# Patient Record
Sex: FEMALE | Race: OTHER | HISPANIC OR LATINO | Employment: OTHER | URBAN - METROPOLITAN AREA
[De-identification: names, ages, dates, MRNs, and addresses within clinical notes are randomized per-mention and may not be internally consistent; named-entity substitution may affect disease eponyms.]

---

## 2017-06-26 ENCOUNTER — TELEPHONE (OUTPATIENT)
Dept: CARDIOLOGY | Facility: MEDICAL CENTER | Age: 67
End: 2017-06-26

## 2017-06-28 ENCOUNTER — OFFICE VISIT (OUTPATIENT)
Dept: CARDIOLOGY | Facility: MEDICAL CENTER | Age: 67
End: 2017-06-28
Payer: MEDICARE

## 2017-06-28 VITALS
BODY MASS INDEX: 22.02 KG/M2 | HEIGHT: 64 IN | WEIGHT: 129 LBS | HEART RATE: 70 BPM | SYSTOLIC BLOOD PRESSURE: 180 MMHG | DIASTOLIC BLOOD PRESSURE: 60 MMHG

## 2017-06-28 DIAGNOSIS — I25.82 CHRONIC TOTAL OCCLUSION OF NATIVE CORONARY ARTERY: ICD-10-CM

## 2017-06-28 DIAGNOSIS — R94.39 ABNORMAL CARDIOVASCULAR STRESS TEST: Chronic | ICD-10-CM

## 2017-06-28 DIAGNOSIS — I25.10 CHRONIC TOTAL OCCLUSION OF NATIVE CORONARY ARTERY: ICD-10-CM

## 2017-06-28 DIAGNOSIS — Z95.5 S/P CORONARY ARTERY STENT PLACEMENT: ICD-10-CM

## 2017-06-28 DIAGNOSIS — I10 ESSENTIAL HYPERTENSION: ICD-10-CM

## 2017-06-28 PROCEDURE — 99214 OFFICE O/P EST MOD 30 MIN: CPT | Performed by: INTERNAL MEDICINE

## 2017-06-28 RX ORDER — ASPIRIN 81 MG/1
81 TABLET, CHEWABLE ORAL DAILY
Qty: 100 TAB | Refills: 11 | Status: SHIPPED | OUTPATIENT
Start: 2017-06-28 | End: 2018-08-21 | Stop reason: SDUPTHER

## 2017-06-28 RX ORDER — CLOPIDOGREL BISULFATE 75 MG/1
75 TABLET ORAL DAILY
Qty: 30 TAB | Refills: 11 | Status: SHIPPED | OUTPATIENT
Start: 2017-06-28 | End: 2017-11-10

## 2017-06-28 NOTE — Clinical Note
[facility]   [address], [address]  [place] [address]  Phone: [phone]  Fax: [phone]              Tere Swann  1950    Encounter Date: 6/28/2017    Ritesh Borjas M.D.          PROGRESS NOTE:  Subjective:   Tere Swann is a 66 y.o. female who presents today for follow-up of her history of coronary disease status post complex PCI for chronic total occlusion right coronary artery    Her son informs me that and she was not deemed a transplant candidate due to lack of supplemental insurance    Her blood pressure was elevated today she did have dialysis this morning she is unclear for blood pressures are elevated typically in the past we have not seen that        Past Medical History   Diagnosis Date   • Kidney transplant candidate    • Abnormal cardiovascular stress test    • Hyperlipidemia    • Hypertension    • Kidney disease      renal failure , on dialysis, M, W, F.   • High cholesterol    • Dental disorder      partial dentures- uppers   • Dialysis patient (CMS-HCC)      F [facility]   • Blood clotting disorder (CMS-HCC)      with AVF   • Diabetes (CMS-HCC)      oral medication     Past Surgical History   Procedure Laterality Date   • Recovery  8/16/2016     Procedure: CATH LAB [facility] WITH POSSIBLE DR. CASTILLO;  Surgeon: Recoveryonly Surgery;  Location: SURGERY PRE-POST PROC UNIT [facility];  Service:    • Other abdominal surgery       left kidney removed due to cancer   • Other       left arm upper extremity fistula   • Cardiac cath  8/16/16     100% RCA   • Cardiac cath  9/7/2016     RCA stented with 2 Synergy drug-eluting stents.     Family History   Problem Relation Age of Onset   • Heart Disease Neg Hx      History   Smoking status   • Never Smoker    Smokeless tobacco   • Never Used     No Known Allergies  Outpatient Encounter Prescriptions as of 6/28/2017   Medication Sig Dispense Refill   • clopidogrel (PLAVIX) 75 MG  "Tab Take 1 Tab by mouth every day. 30 Tab 11   • aspirin (ASA) 81 MG Chew Tab chewable tablet Take 1 Tab by mouth every day. 100 Tab 11   • glipiZIDE (GLUCOTROL) 5 MG Tab Take 1 Tab by mouth every day. 90 Tab 4   • furosemide (LASIX) 40 MG Tab TAKE 1 TABLET BY MOUTH DAILY 30 Tab 0   • amlodipine (NORVASC) 10 MG Tab Take 1 Tab by mouth every day. 30 Tab 11   • furosemide (LASIX) 40 MG Tab Take 1 Tab by mouth every day. 30 Tab 11   • metoprolol (LOPRESSOR) 25 MG Tab Take 1 Tab by mouth 2 Times a Day. 60 Tab 11   • pravastatin (PRAVACHOL) 40 MG tablet Take 1 Tab by mouth every day. 90 Tab 3   • [DISCONTINUED] clopidogrel (PLAVIX) 75 MG Tab Take 1 Tab by mouth every day. 30 Tab 11   • [DISCONTINUED] aspirin (ASA) 81 MG Chew Tab chewable tablet Take 1 Tab by mouth every day. 100 Tab 11     No facility-administered encounter medications on file as of 6/28/2017.     Review of Systems   Constitutional: Negative for fever and chills.   HENT: Negative for sore throat.    Eyes: Negative for blurred vision.   Respiratory: Negative for cough and shortness of breath.    Cardiovascular: Negative for chest pain, palpitations, claudication, leg swelling and PND.   Gastrointestinal: Negative for nausea and abdominal pain.   Musculoskeletal: Negative for falls.   Skin: Negative for rash.   Neurological: Negative for dizziness, focal weakness, loss of consciousness, weakness and headaches.   Endo/Heme/Allergies: Does not bruise/bleed easily.        Objective:   /60 mmHg  Pulse 70  Ht 1.626 m (5' 4.02\")  Wt 58.514 kg (129 lb)  BMI 22.13 kg/m2  LMP  (LMP Unknown)    Physical Exam   Constitutional: No distress.   HENT:   Mouth/Throat: Oropharynx is clear and moist.   Eyes: No scleral icterus.   Cardiovascular: Normal rate, regular rhythm and intact distal pulses.  Exam reveals no gallop and no friction rub.    No murmur heard.  Pulmonary/Chest: Effort normal and breath sounds normal. She has no rales.   Abdominal: Bowel sounds " are normal. There is no tenderness.   Musculoskeletal: She exhibits no edema.   Neurological: She is alert.   Skin: No rash noted. She is not diaphoretic.   Psychiatric: She has a normal mood and affect.       Assessment:     1. Chronic total occlusion of native coronary artery     2. S/P coronary artery stent placement  clopidogrel (PLAVIX) 75 MG Tab   3. Essential hypertension  aspirin (ASA) 81 MG Chew Tab chewable tablet   4. Abnormal cardiovascular stress test  aspirin (ASA) 81 MG Chew Tab chewable tablet       Medical Decision Making:  Today's Assessment / Status / Plan:     It was my pleasure to meet with Ms. Swann.    I renewed her dual anti- platelet therapy and reinforce it is important.    She remains a good candidate for her heart disease for a kidney transplant    I will see Ms. Swann back in 1 year time and encouraged her to follow up with us over the phone or e-mail using my MyChart as issues arise.    It is my pleasure to participate in the care of Ms. Swann.  Please do not hesitate to contact me with questions or concerns.    Ritesh Borjas MD PhD [facility]  Cardiologist [facility]        [facility]  VIA Facsimile: [phone]

## 2017-06-30 ASSESSMENT — ENCOUNTER SYMPTOMS
BLURRED VISION: 0
ABDOMINAL PAIN: 0
WEAKNESS: 0
BRUISES/BLEEDS EASILY: 0
PND: 0
SORE THROAT: 0
CHILLS: 0
SHORTNESS OF BREATH: 0
FOCAL WEAKNESS: 0
FALLS: 0
HEADACHES: 0
COUGH: 0
DIZZINESS: 0
PALPITATIONS: 0
LOSS OF CONSCIOUSNESS: 0
NAUSEA: 0
FEVER: 0
CLAUDICATION: 0

## 2017-06-30 NOTE — PROGRESS NOTES
Subjective:   Tere Swann is a 66 y.o. female who presents today for follow-up of her history of coronary disease status post complex PCI for chronic total occlusion right coronary artery    Her son informs me that and she was not deemed a transplant candidate due to lack of supplemental insurance    Her blood pressure was elevated today she did have dialysis this morning she is unclear for blood pressures are elevated typically in the past we have not seen that        Past Medical History   Diagnosis Date   • Kidney transplant candidate    • Abnormal cardiovascular stress test    • Hyperlipidemia    • Hypertension    • Kidney disease      renal failure , on dialysis, M, W, F.   • High cholesterol    • Dental disorder      partial dentures- uppers   • Dialysis patient (CMS-HCC)      MWF [facility]   • Blood clotting disorder (CMS-HCC)      with AVF   • Diabetes (CMS-HCC)      oral medication     Past Surgical History   Procedure Laterality Date   • Recovery  8/16/2016     Procedure: CATH LAB [facility] WITH POSSIBLE DR. CASTILLO;  Surgeon: Recoveryonly Surgery;  Location: SURGERY PRE-POST PROC UNIT [facility] [place];  Service:    • Other abdominal surgery       left kidney removed due to cancer   • Other       left arm upper extremity fistula   • Cardiac cath  8/16/16     100% RCA   • Cardiac cath  9/7/2016     RCA stented with 2 Synergy drug-eluting stents.     Family History   Problem Relation Age of Onset   • Heart Disease Neg Hx      History   Smoking status   • Never Smoker    Smokeless tobacco   • Never Used     No Known Allergies  Outpatient Encounter Prescriptions as of 6/28/2017   Medication Sig Dispense Refill   • clopidogrel (PLAVIX) 75 MG Tab Take 1 Tab by mouth every day. 30 Tab 11   • aspirin (ASA) 81 MG Chew Tab chewable tablet Take 1 Tab by mouth every day. 100 Tab 11   • glipiZIDE (GLUCOTROL) 5 MG Tab Take 1 Tab by mouth every day. 90 Tab 4   • furosemide (LASIX) 40 MG Tab TAKE 1 TABLET BY MOUTH DAILY  "30 Tab 0   • amlodipine (NORVASC) 10 MG Tab Take 1 Tab by mouth every day. 30 Tab 11   • furosemide (LASIX) 40 MG Tab Take 1 Tab by mouth every day. 30 Tab 11   • metoprolol (LOPRESSOR) 25 MG Tab Take 1 Tab by mouth 2 Times a Day. 60 Tab 11   • pravastatin (PRAVACHOL) 40 MG tablet Take 1 Tab by mouth every day. 90 Tab 3   • [DISCONTINUED] clopidogrel (PLAVIX) 75 MG Tab Take 1 Tab by mouth every day. 30 Tab 11   • [DISCONTINUED] aspirin (ASA) 81 MG Chew Tab chewable tablet Take 1 Tab by mouth every day. 100 Tab 11     No facility-administered encounter medications on file as of 6/28/2017.     Review of Systems   Constitutional: Negative for fever and chills.   HENT: Negative for sore throat.    Eyes: Negative for blurred vision.   Respiratory: Negative for cough and shortness of breath.    Cardiovascular: Negative for chest pain, palpitations, claudication, leg swelling and PND.   Gastrointestinal: Negative for nausea and abdominal pain.   Musculoskeletal: Negative for falls.   Skin: Negative for rash.   Neurological: Negative for dizziness, focal weakness, loss of consciousness, weakness and headaches.   Endo/Heme/Allergies: Does not bruise/bleed easily.        Objective:   /60 mmHg  Pulse 70  Ht 1.626 m (5' 4.02\")  Wt 58.514 kg (129 lb)  BMI 22.13 kg/m2  LMP  (LMP Unknown)    Physical Exam   Constitutional: No distress.   HENT:   Mouth/Throat: Oropharynx is clear and moist.   Eyes: No scleral icterus.   Cardiovascular: Normal rate, regular rhythm and intact distal pulses.  Exam reveals no gallop and no friction rub.    No murmur heard.  Pulmonary/Chest: Effort normal and breath sounds normal. She has no rales.   Abdominal: Bowel sounds are normal. There is no tenderness.   Musculoskeletal: She exhibits no edema.   Neurological: She is alert.   Skin: No rash noted. She is not diaphoretic.   Psychiatric: She has a normal mood and affect.       Assessment:     1. Chronic total occlusion of native coronary " artery     2. S/P coronary artery stent placement  clopidogrel (PLAVIX) 75 MG Tab   3. Essential hypertension  aspirin (ASA) 81 MG Chew Tab chewable tablet   4. Abnormal cardiovascular stress test  aspirin (ASA) 81 MG Chew Tab chewable tablet       Medical Decision Making:  Today's Assessment / Status / Plan:     It was my pleasure to meet with Ms. Swann.    I renewed her dual anti- platelet therapy and reinforce it is important.    She remains a good candidate for her heart disease for a kidney transplant    I will see Ms. Swann back in 1 year time and encouraged her to follow up with us over the phone or e-mail using my MyChart as issues arise.    It is my pleasure to participate in the care of Ms. Swann.  Please do not hesitate to contact me with questions or concerns.    Ritesh Borjas MD PhD FACC  Cardiologist [facility]

## 2017-07-27 DIAGNOSIS — I10 BENIGN ESSENTIAL HTN: ICD-10-CM

## 2017-07-27 RX ORDER — AMLODIPINE BESYLATE 10 MG/1
TABLET ORAL
Qty: 30 TAB | Refills: 3 | Status: SHIPPED | OUTPATIENT
Start: 2017-07-27 | End: 2017-10-13 | Stop reason: SDUPTHER

## 2017-09-08 ENCOUNTER — HOSPITAL ENCOUNTER (OUTPATIENT)
Dept: RADIOLOGY | Facility: MEDICAL CENTER | Age: 67
End: 2017-09-08
Attending: INTERNAL MEDICINE
Payer: MEDICARE

## 2017-09-08 DIAGNOSIS — Z86.11 HISTORY OF TB (TUBERCULOSIS): ICD-10-CM

## 2017-09-08 PROCEDURE — 71020 DX-CHEST-2 VIEWS: CPT

## 2017-10-05 ENCOUNTER — OFFICE VISIT (OUTPATIENT)
Dept: MEDICAL GROUP | Facility: CLINIC | Age: 67
End: 2017-10-05
Payer: MEDICARE

## 2017-10-05 VITALS
HEART RATE: 70 BPM | WEIGHT: 136 LBS | SYSTOLIC BLOOD PRESSURE: 168 MMHG | BODY MASS INDEX: 23.22 KG/M2 | HEIGHT: 64 IN | RESPIRATION RATE: 14 BRPM | DIASTOLIC BLOOD PRESSURE: 70 MMHG | OXYGEN SATURATION: 99 % | TEMPERATURE: 97.1 F

## 2017-10-05 DIAGNOSIS — Z99.2 END STAGE RENAL FAILURE ON DIALYSIS (HCC): ICD-10-CM

## 2017-10-05 DIAGNOSIS — F43.21 GRIEF: ICD-10-CM

## 2017-10-05 DIAGNOSIS — N18.6 END STAGE RENAL FAILURE ON DIALYSIS (HCC): ICD-10-CM

## 2017-10-05 DIAGNOSIS — N18.5 CKD (CHRONIC KIDNEY DISEASE) STAGE 5, GFR LESS THAN 15 ML/MIN (HCC): ICD-10-CM

## 2017-10-05 DIAGNOSIS — R63.0 POOR APPETITE: ICD-10-CM

## 2017-10-05 PROCEDURE — 99213 OFFICE O/P EST LOW 20 MIN: CPT | Performed by: NURSE PRACTITIONER

## 2017-10-05 RX ORDER — SEVELAMER CARBONATE 800 MG/1
2400 TABLET, FILM COATED ORAL 3 TIMES DAILY
COMMUNITY
End: 2018-12-22 | Stop reason: CLARIF

## 2017-10-05 RX ORDER — MIRTAZAPINE 7.5 MG/1
7.5 TABLET, FILM COATED ORAL
Qty: 30 TAB | Refills: 3 | Status: SHIPPED | OUTPATIENT
Start: 2017-10-05 | End: 2017-10-09

## 2017-10-05 ASSESSMENT — PATIENT HEALTH QUESTIONNAIRE - PHQ9
SUM OF ALL RESPONSES TO PHQ QUESTIONS 1-9: 9
5. POOR APPETITE OR OVEREATING: 2 - MORE THAN HALF THE DAYS
CLINICAL INTERPRETATION OF PHQ2 SCORE: 2

## 2017-10-05 ASSESSMENT — ENCOUNTER SYMPTOMS
WEAKNESS: 0
INSOMNIA: 1
BACK PAIN: 1
WEIGHT LOSS: 0
DEPRESSION: 1
DIZZINESS: 1
FEVER: 0
CHILLS: 0
HEADACHES: 0

## 2017-10-05 NOTE — PROGRESS NOTES
Chief Complaint   Patient presents with   • Loss of Appetite     a nd weak poss due to dyalisis treatment       HISTORY OF PRESENT ILLNESS: Patient is a 66 y.o. female established patient who presents today due to one month hx of poor appetite and feeling down. Pt's here is today with her son. Pt is Persian speaking only but son is able to help on interpretation. Son reports that pt's brother recently passed away about a month ago, Ever since then, pt seems to a little bit depressed. Pt's son is wondering if pt would benefit from taking medication. She denied suicidal ideation. Pt declined psychiatrist referral and would like to try on medication and follow up with PCP.     Pt's PHQ-9: 9    End stage renal failure on dialysis (CMS-HCC)  Hx of left nephrectomy due to kidney cancer  On dialysis MWF, following Dr. Burnett.   On waiting list for kidney transplant at [facility].    Pt's son requests lasix refill. Pt has been on lasix for a long time. Pt reports that she has ran out of medication and she does not remember when was her last dose and when was last time she talks to nephrologist.         Patient Active Problem List    Diagnosis Date Noted   • Encounter for cervical Pap smear with pelvic exam 09/23/2016   • Chronic total occlusion of native coronary artery 09/07/2016   • S/P coronary artery stent placement 09/07/2016   • Encounter to establish care with new doctor 09/01/2016   • Renal hypertension 08/05/2016   • End stage renal failure on dialysis (CMS-HCC) 08/05/2016   • Type 2 diabetes mellitus (CMS-HCC) 08/05/2016   • Kidney transplant candidate    • Abnormal cardiovascular stress test    • CKD (chronic kidney disease) stage 5, GFR less than 15 ml/min (CMS-HCC) 01/14/2014   • Essential hypertension 09/17/2013       Allergies:Review of patient's allergies indicates no known allergies.    Current Outpatient Prescriptions   Medication Sig Dispense Refill   • furosemide (LASIX) 40 MG Tab Take 1 Tab by mouth  "every day. 30 Tab 0   • Sevelamer Carbonate 800 MG Tab Take  by mouth.     • mirtazapine (REMERON) 7.5 MG tablet Take 1 Tab by mouth every bedtime. 30 Tab 3   • amlodipine (NORVASC) 10 MG Tab TAKE 1 TAB BY MOUTH EVERY DAY. 30 Tab 3   • clopidogrel (PLAVIX) 75 MG Tab Take 1 Tab by mouth every day. 30 Tab 11   • aspirin (ASA) 81 MG Chew Tab chewable tablet Take 1 Tab by mouth every day. 100 Tab 11   • glipiZIDE (GLUCOTROL) 5 MG Tab Take 1 Tab by mouth every day. 90 Tab 4   • metoprolol (LOPRESSOR) 25 MG Tab Take 1 Tab by mouth 2 Times a Day. 60 Tab 11   • pravastatin (PRAVACHOL) 40 MG tablet Take 1 Tab by mouth every day. 90 Tab 3     No current facility-administered medications for this visit.        Social History   Substance Use Topics   • Smoking status: Never Smoker   • Smokeless tobacco: Never Used   • Alcohol use No       Family Status   Relation Status   • Mother  at age 20+    ? CHF, had ascities   • Father  at age 93    old age   • Sister  at age 68    diabetes   • Neg Hx      Family History   Problem Relation Age of Onset   • Heart Disease Neg Hx          ROS:  Review of Systems   Constitutional: Positive for malaise/fatigue. Negative for chills, fever and weight loss.   Gastrointestinal:        Poor appetite for a month without losing weight   Musculoskeletal: Positive for back pain.   Neurological: Positive for dizziness (when chaning position). Negative for weakness and headaches.   Psychiatric/Behavioral: Positive for depression. Negative for suicidal ideas. The patient has insomnia.         Objective:     Blood pressure (!) 168/70, pulse 70, temperature 36.2 °C (97.1 °F), resp. rate 14, height 1.626 m (5' 4\"), weight 61.7 kg (136 lb), SpO2 99 %.     Physical Exam:  Physical Exam   Constitutional: She is well-developed, well-nourished, and in no distress.   HENT:   Head: Normocephalic and atraumatic.   Eyes: Conjunctivae are normal.   Neck: Neck supple. No JVD present. "   Cardiovascular: Normal rate.    Pulmonary/Chest: Effort normal. No respiratory distress. She has no wheezes. She has no rales.   Abdominal: Soft.   Musculoskeletal: Normal range of motion. She exhibits no edema or tenderness.   Skin: Skin is warm.   Vitals reviewed.      Assessment/Plan:  1. End stage renal failure on dialysis (CMS-HCC)  - HD MWF, follow up with nephrologist  - Obtain Results: Other (see comment); Obtain Results From: Other (see comment) (shanice [facility]): recent blood test result from [facility].  - per request: refill one month supply and pt has to follow up with nephrologist for future refill. furosemide (LASIX) 40 MG Tab; Take 1 Tab by mouth every day.  Dispense: 30 Tab; Refill: 0    2. Poor appetite, denied body weight loss.   - mirtazapine (REMERON) 7.5 MG tablet; Take 1 Tab by mouth every bedtime.  Dispense: 30 Tab; Refill: 3    3. Grief, mild depression  - mirtazapine (REMERON) 7.5 MG tablet; Take 1 Tab by mouth every bedtime.  Dispense: 30 Tab; Refill: 3  Pt's son is infomred on the sedation effect with mirtazapine. Fall precaution. He verbalized understanding.   - Follow up with PCP.     4. CKD (chronic kidney disease) stage 5, GFR less than 15 ml/min (CMS-HCC)  - furosemide (LASIX) 40 MG Tab; Take 1 Tab by mouth every day.  Dispense: 30 Tab; Refill: 0     Differential diagnoses and indications for immediate follow-up discussed with patient.    Instructed to return to clinic or nearest emergency department for any change in condition, further concerns, or worsening of symptoms.    The patient demonstrated a good understanding and agreed with the treatment plan.

## 2017-10-06 RX ORDER — FUROSEMIDE 40 MG/1
40 TABLET ORAL DAILY
Qty: 30 TAB | Refills: 0 | Status: SHIPPED | OUTPATIENT
Start: 2017-10-06 | End: 2017-11-27

## 2017-10-09 ENCOUNTER — TELEPHONE (OUTPATIENT)
Dept: MEDICAL GROUP | Facility: CLINIC | Age: 67
End: 2017-10-09

## 2017-10-09 DIAGNOSIS — F32.0 MILD SINGLE CURRENT EPISODE OF MAJOR DEPRESSIVE DISORDER (HCC): ICD-10-CM

## 2017-10-09 DIAGNOSIS — F51.04 PSYCHOPHYSIOLOGICAL INSOMNIA: ICD-10-CM

## 2017-10-09 DIAGNOSIS — R63.0 POOR APPETITE: ICD-10-CM

## 2017-10-09 RX ORDER — TRAZODONE HYDROCHLORIDE 50 MG/1
50 TABLET ORAL
Qty: 30 TAB | Refills: 3 | Status: SHIPPED | OUTPATIENT
Start: 2017-10-09 | End: 2018-12-22

## 2017-10-09 NOTE — TELEPHONE ENCOUNTER
1. Caller Name: Fabricio son                       Call Back Number: [phone] (home)       2. Message: spoke to fabricio son he will make the apt to nephrology today, but Fabricio wants to let you know that the medication you prescribe to his mom mirtazapine (REMERON) 7.5 MG tablet is making her very anxious at night please advise. Thank you     3. Patient approves office to leave a detailed voicemail/MyChart message: yes

## 2017-10-10 NOTE — TELEPHONE ENCOUNTER
I have placed referral order to psychiatrist for medication recommendation and adjustment.   Hold off mirtazapine. Try trazodone at night time. New prescription sent.    Thanks.  Nyla

## 2017-10-10 NOTE — TELEPHONE ENCOUNTER
Addendum:   It takes time to see psychiatrist. If she does not get to see psychiatrist, have pt return for follow up in 4 weeks or sooner if needed. Pt can also see her PCP for opinion.     Thanks.   Nyla

## 2017-10-13 DIAGNOSIS — I10 BENIGN ESSENTIAL HTN: ICD-10-CM

## 2017-10-13 RX ORDER — AMLODIPINE BESYLATE 10 MG/1
10 TABLET ORAL
Qty: 90 TAB | Refills: 3 | Status: SHIPPED | OUTPATIENT
Start: 2017-10-13 | End: 2018-12-22 | Stop reason: CLARIF

## 2017-10-16 ENCOUNTER — HOSPITAL ENCOUNTER (INPATIENT)
Facility: MEDICAL CENTER | Age: 67
LOS: 2 days | DRG: 304 | End: 2017-10-18
Attending: EMERGENCY MEDICINE | Admitting: INTERNAL MEDICINE
Payer: MEDICARE

## 2017-10-16 ENCOUNTER — APPOINTMENT (OUTPATIENT)
Dept: RADIOLOGY | Facility: MEDICAL CENTER | Age: 67
DRG: 304 | End: 2017-10-16
Attending: EMERGENCY MEDICINE
Payer: MEDICARE

## 2017-10-16 ENCOUNTER — RESOLUTE PROFESSIONAL BILLING HOSPITAL PROF FEE (OUTPATIENT)
Dept: HOSPITALIST | Facility: MEDICAL CENTER | Age: 67
End: 2017-10-16
Payer: MEDICARE

## 2017-10-16 DIAGNOSIS — I16.0 HYPERTENSIVE URGENCY: ICD-10-CM

## 2017-10-16 DIAGNOSIS — R07.9 CHEST PAIN, UNSPECIFIED TYPE: ICD-10-CM

## 2017-10-16 DIAGNOSIS — R06.02 SHORTNESS OF BREATH: ICD-10-CM

## 2017-10-16 PROBLEM — N18.6 ESRD (END STAGE RENAL DISEASE) (HCC): Status: ACTIVE | Noted: 2017-10-16

## 2017-10-16 PROBLEM — I50.9 ACUTE CHF (HCC): Status: ACTIVE | Noted: 2017-10-16

## 2017-10-16 LAB
ALBUMIN SERPL BCP-MCNC: 4.1 G/DL (ref 3.2–4.9)
ALBUMIN/GLOB SERPL: 1.2 G/DL
ALP SERPL-CCNC: 81 U/L (ref 30–99)
ALT SERPL-CCNC: 35 U/L (ref 2–50)
ANION GAP SERPL CALC-SCNC: 11 MMOL/L (ref 0–11.9)
AST SERPL-CCNC: 33 U/L (ref 12–45)
BASOPHILS # BLD AUTO: 0.3 % (ref 0–1.8)
BASOPHILS # BLD: 0.01 K/UL (ref 0–0.12)
BILIRUB SERPL-MCNC: 0.9 MG/DL (ref 0.1–1.5)
BNP SERPL-MCNC: 2065 PG/ML (ref 0–100)
BUN SERPL-MCNC: 23 MG/DL (ref 8–22)
CALCIUM SERPL-MCNC: 9.3 MG/DL (ref 8.4–10.2)
CHLORIDE SERPL-SCNC: 95 MMOL/L (ref 96–112)
CO2 SERPL-SCNC: 32 MMOL/L (ref 20–33)
CREAT SERPL-MCNC: 4.77 MG/DL (ref 0.5–1.4)
EKG IMPRESSION: NORMAL
EKG IMPRESSION: NORMAL
EOSINOPHIL # BLD AUTO: 0.05 K/UL (ref 0–0.51)
EOSINOPHIL NFR BLD: 1.5 % (ref 0–6.9)
ERYTHROCYTE [DISTWIDTH] IN BLOOD BY AUTOMATED COUNT: 43.9 FL (ref 35.9–50)
GFR SERPL CREATININE-BSD FRML MDRD: 9 ML/MIN/1.73 M 2
GLOBULIN SER CALC-MCNC: 3.4 G/DL (ref 1.9–3.5)
GLUCOSE BLD-MCNC: 171 MG/DL (ref 65–99)
GLUCOSE SERPL-MCNC: 127 MG/DL (ref 65–99)
HCT VFR BLD AUTO: 25.7 % (ref 37–47)
HGB BLD-MCNC: 8.7 G/DL (ref 12–16)
IMM GRANULOCYTES # BLD AUTO: 0.02 K/UL (ref 0–0.11)
IMM GRANULOCYTES NFR BLD AUTO: 0.6 % (ref 0–0.9)
LYMPHOCYTES # BLD AUTO: 0.57 K/UL (ref 1–4.8)
LYMPHOCYTES NFR BLD: 16.7 % (ref 22–41)
MCH RBC QN AUTO: 30.4 PG (ref 27–33)
MCHC RBC AUTO-ENTMCNC: 33.9 G/DL (ref 33.6–35)
MCV RBC AUTO: 89.9 FL (ref 81.4–97.8)
MONOCYTES # BLD AUTO: 0.21 K/UL (ref 0–0.85)
MONOCYTES NFR BLD AUTO: 6.1 % (ref 0–13.4)
NEUTROPHILS # BLD AUTO: 2.56 K/UL (ref 2–7.15)
NEUTROPHILS NFR BLD: 74.8 % (ref 44–72)
NRBC # BLD AUTO: 0 K/UL
NRBC BLD AUTO-RTO: 0 /100 WBC
PLATELET # BLD AUTO: 142 K/UL (ref 164–446)
PMV BLD AUTO: 12.2 FL (ref 9–12.9)
POTASSIUM SERPL-SCNC: 4.2 MMOL/L (ref 3.6–5.5)
PROT SERPL-MCNC: 7.5 G/DL (ref 6–8.2)
RBC # BLD AUTO: 2.86 M/UL (ref 4.2–5.4)
SODIUM SERPL-SCNC: 138 MMOL/L (ref 135–145)
TROPONIN I SERPL-MCNC: 0.05 NG/ML (ref 0–0.04)
WBC # BLD AUTO: 3.4 K/UL (ref 4.8–10.8)

## 2017-10-16 PROCEDURE — 700102 HCHG RX REV CODE 250 W/ 637 OVERRIDE(OP): Performed by: INTERNAL MEDICINE

## 2017-10-16 PROCEDURE — 96374 THER/PROPH/DIAG INJ IV PUSH: CPT

## 2017-10-16 PROCEDURE — 99285 EMERGENCY DEPT VISIT HI MDM: CPT

## 2017-10-16 PROCEDURE — 80053 COMPREHEN METABOLIC PANEL: CPT

## 2017-10-16 PROCEDURE — 700102 HCHG RX REV CODE 250 W/ 637 OVERRIDE(OP): Performed by: EMERGENCY MEDICINE

## 2017-10-16 PROCEDURE — 770020 HCHG ROOM/CARE - TELE (206)

## 2017-10-16 PROCEDURE — 90662 IIV NO PRSV INCREASED AG IM: CPT | Performed by: INTERNAL MEDICINE

## 2017-10-16 PROCEDURE — 90471 IMMUNIZATION ADMIN: CPT

## 2017-10-16 PROCEDURE — 82962 GLUCOSE BLOOD TEST: CPT

## 2017-10-16 PROCEDURE — 700111 HCHG RX REV CODE 636 W/ 250 OVERRIDE (IP)

## 2017-10-16 PROCEDURE — 71010 DX-CHEST-PORTABLE (1 VIEW): CPT

## 2017-10-16 PROCEDURE — 36415 COLL VENOUS BLD VENIPUNCTURE: CPT

## 2017-10-16 PROCEDURE — 83880 ASSAY OF NATRIURETIC PEPTIDE: CPT

## 2017-10-16 PROCEDURE — 700102 HCHG RX REV CODE 250 W/ 637 OVERRIDE(OP)

## 2017-10-16 PROCEDURE — 93005 ELECTROCARDIOGRAM TRACING: CPT | Performed by: EMERGENCY MEDICINE

## 2017-10-16 PROCEDURE — 93005 ELECTROCARDIOGRAM TRACING: CPT

## 2017-10-16 PROCEDURE — 90670 PCV13 VACCINE IM: CPT

## 2017-10-16 PROCEDURE — 700101 HCHG RX REV CODE 250: Performed by: EMERGENCY MEDICINE

## 2017-10-16 PROCEDURE — A9270 NON-COVERED ITEM OR SERVICE: HCPCS | Performed by: EMERGENCY MEDICINE

## 2017-10-16 PROCEDURE — 700111 HCHG RX REV CODE 636 W/ 250 OVERRIDE (IP): Performed by: INTERNAL MEDICINE

## 2017-10-16 PROCEDURE — 99223 1ST HOSP IP/OBS HIGH 75: CPT | Mod: AI | Performed by: INTERNAL MEDICINE

## 2017-10-16 PROCEDURE — A9270 NON-COVERED ITEM OR SERVICE: HCPCS | Performed by: INTERNAL MEDICINE

## 2017-10-16 PROCEDURE — 84484 ASSAY OF TROPONIN QUANT: CPT

## 2017-10-16 PROCEDURE — 3E0234Z INTRODUCTION OF SERUM, TOXOID AND VACCINE INTO MUSCLE, PERCUTANEOUS APPROACH: ICD-10-PCS | Performed by: INTERNAL MEDICINE

## 2017-10-16 PROCEDURE — 85025 COMPLETE CBC W/AUTO DIFF WBC: CPT

## 2017-10-16 RX ORDER — ASPIRIN 325 MG
325 TABLET ORAL DAILY
Status: DISCONTINUED | OUTPATIENT
Start: 2017-10-17 | End: 2017-10-18 | Stop reason: HOSPADM

## 2017-10-16 RX ORDER — LABETALOL HYDROCHLORIDE 5 MG/ML
20 INJECTION, SOLUTION INTRAVENOUS ONCE
Status: COMPLETED | OUTPATIENT
Start: 2017-10-16 | End: 2017-10-16

## 2017-10-16 RX ORDER — ASPIRIN 325 MG
325 TABLET ORAL ONCE
Status: COMPLETED | OUTPATIENT
Start: 2017-10-16 | End: 2017-10-16

## 2017-10-16 RX ORDER — TRAZODONE HYDROCHLORIDE 50 MG/1
50 TABLET ORAL
Status: DISCONTINUED | OUTPATIENT
Start: 2017-10-16 | End: 2017-10-18 | Stop reason: HOSPADM

## 2017-10-16 RX ORDER — FUROSEMIDE 10 MG/ML
40 INJECTION INTRAMUSCULAR; INTRAVENOUS
Status: DISCONTINUED | OUTPATIENT
Start: 2017-10-16 | End: 2017-10-18 | Stop reason: HOSPADM

## 2017-10-16 RX ORDER — ASPIRIN 600 MG/1
300 SUPPOSITORY RECTAL DAILY
Status: DISCONTINUED | OUTPATIENT
Start: 2017-10-17 | End: 2017-10-18 | Stop reason: HOSPADM

## 2017-10-16 RX ORDER — LABETALOL HYDROCHLORIDE 5 MG/ML
10 INJECTION, SOLUTION INTRAVENOUS EVERY 4 HOURS PRN
Status: DISCONTINUED | OUTPATIENT
Start: 2017-10-16 | End: 2017-10-18 | Stop reason: HOSPADM

## 2017-10-16 RX ORDER — HEPARIN SODIUM 5000 [USP'U]/ML
5000 INJECTION, SOLUTION INTRAVENOUS; SUBCUTANEOUS EVERY 8 HOURS
Status: DISCONTINUED | OUTPATIENT
Start: 2017-10-17 | End: 2017-10-18 | Stop reason: HOSPADM

## 2017-10-16 RX ORDER — ASPIRIN 81 MG/1
324 TABLET, CHEWABLE ORAL DAILY
Status: DISCONTINUED | OUTPATIENT
Start: 2017-10-17 | End: 2017-10-18 | Stop reason: HOSPADM

## 2017-10-16 RX ORDER — ATORVASTATIN CALCIUM 20 MG/1
20 TABLET, FILM COATED ORAL NIGHTLY
COMMUNITY
End: 2019-02-23

## 2017-10-16 RX ORDER — CLOPIDOGREL BISULFATE 75 MG/1
75 TABLET ORAL DAILY
Status: DISCONTINUED | OUTPATIENT
Start: 2017-10-17 | End: 2017-10-18 | Stop reason: HOSPADM

## 2017-10-16 RX ORDER — AMOXICILLIN 250 MG
2 CAPSULE ORAL 2 TIMES DAILY
Status: DISCONTINUED | OUTPATIENT
Start: 2017-10-16 | End: 2017-10-18 | Stop reason: HOSPADM

## 2017-10-16 RX ORDER — AMLODIPINE BESYLATE 5 MG/1
10 TABLET ORAL DAILY
Status: DISCONTINUED | OUTPATIENT
Start: 2017-10-17 | End: 2017-10-18 | Stop reason: HOSPADM

## 2017-10-16 RX ORDER — SEVELAMER HYDROCHLORIDE 800 MG/1
800 TABLET, FILM COATED ORAL
Status: DISCONTINUED | OUTPATIENT
Start: 2017-10-17 | End: 2017-10-18 | Stop reason: HOSPADM

## 2017-10-16 RX ORDER — DEXTROSE MONOHYDRATE 25 G/50ML
25 INJECTION, SOLUTION INTRAVENOUS
Status: DISCONTINUED | OUTPATIENT
Start: 2017-10-16 | End: 2017-10-18 | Stop reason: HOSPADM

## 2017-10-16 RX ORDER — ATORVASTATIN CALCIUM 40 MG/1
20 TABLET, FILM COATED ORAL NIGHTLY
Status: DISCONTINUED | OUTPATIENT
Start: 2017-10-16 | End: 2017-10-18 | Stop reason: HOSPADM

## 2017-10-16 RX ORDER — POLYETHYLENE GLYCOL 3350 17 G/17G
1 POWDER, FOR SOLUTION ORAL
Status: DISCONTINUED | OUTPATIENT
Start: 2017-10-16 | End: 2017-10-18 | Stop reason: HOSPADM

## 2017-10-16 RX ORDER — METOPROLOL TARTRATE 50 MG/1
25 TABLET, FILM COATED ORAL 2 TIMES DAILY
Status: DISCONTINUED | OUTPATIENT
Start: 2017-10-16 | End: 2017-10-18 | Stop reason: HOSPADM

## 2017-10-16 RX ORDER — BISACODYL 10 MG
10 SUPPOSITORY, RECTAL RECTAL
Status: DISCONTINUED | OUTPATIENT
Start: 2017-10-16 | End: 2017-10-18 | Stop reason: HOSPADM

## 2017-10-16 RX ADMIN — ATORVASTATIN CALCIUM 20 MG: 40 TABLET, FILM COATED ORAL at 22:28

## 2017-10-16 RX ADMIN — INFLUENZA A VIRUSA/MICHIGAN/45/2015 X-275 (H1N1) ANTIGEN (FORMALDEHYDE INACTIVATED), INFLUENZA A VIRUS A/HONG KONG/4801/2014 X-263B (H3N2) ANTIGEN (FORMALDEHYDE INACTIVATED), AND INFLUENZA B VIRUS B/BRISBANE/60/2008 ANTIGEN (FORMALDEHYDE INACTIVATED) 0.5 ML: 60; 60; 60 INJECTION, SUSPENSION INTRAMUSCULAR at 22:50

## 2017-10-16 RX ADMIN — INSULIN LISPRO 2 UNITS: 100 INJECTION, SOLUTION INTRAVENOUS; SUBCUTANEOUS at 22:42

## 2017-10-16 RX ADMIN — METOPROLOL TARTRATE 25 MG: 50 TABLET, FILM COATED ORAL at 22:26

## 2017-10-16 RX ADMIN — LABETALOL HYDROCHLORIDE 20 MG: 5 INJECTION, SOLUTION INTRAVENOUS at 19:01

## 2017-10-16 RX ADMIN — PNEUMOCOCCAL 13-VALENT CONJUGATE VACCINE 0.5 ML: 2.2; 2.2; 2.2; 2.2; 2.2; 4.4; 2.2; 2.2; 2.2; 2.2; 2.2; 2.2; 2.2 INJECTION, SUSPENSION INTRAMUSCULAR at 22:46

## 2017-10-16 RX ADMIN — ASPIRIN 325 MG ORAL TABLET 325 MG: 325 PILL ORAL at 19:01

## 2017-10-16 RX ADMIN — FUROSEMIDE 40 MG: 10 INJECTION, SOLUTION INTRAVENOUS at 22:29

## 2017-10-16 RX ADMIN — TRAZODONE HYDROCHLORIDE 50 MG: 50 TABLET ORAL at 22:26

## 2017-10-16 RX ADMIN — STANDARDIZED SENNA CONCENTRATE AND DOCUSATE SODIUM 2 TABLET: 8.6; 5 TABLET, FILM COATED ORAL at 22:28

## 2017-10-16 ASSESSMENT — LIFESTYLE VARIABLES
DO YOU DRINK ALCOHOL: NO
EVER_SMOKED: NEVER

## 2017-10-16 ASSESSMENT — PATIENT HEALTH QUESTIONNAIRE - PHQ9
4. FEELING TIRED OR HAVING LITTLE ENERGY: NEARLY EVERY DAY
SUM OF ALL RESPONSES TO PHQ9 QUESTIONS 1 AND 2: 1
9. THOUGHTS THAT YOU WOULD BE BETTER OFF DEAD, OR OF HURTING YOURSELF: NOT AT ALL
5. POOR APPETITE OR OVEREATING: MORE THAN HALF THE DAYS
8. MOVING OR SPEAKING SO SLOWLY THAT OTHER PEOPLE COULD HAVE NOTICED. OR THE OPPOSITE, BEING SO FIGETY OR RESTLESS THAT YOU HAVE BEEN MOVING AROUND A LOT MORE THAN USUAL: NOT AT ALL
7. TROUBLE CONCENTRATING ON THINGS, SUCH AS READING THE NEWSPAPER OR WATCHING TELEVISION: NOT AT ALL
1. LITTLE INTEREST OR PLEASURE IN DOING THINGS: SEVERAL DAYS
6. FEELING BAD ABOUT YOURSELF - OR THAT YOU ARE A FAILURE OR HAVE LET YOURSELF OR YOUR FAMILY DOWN: NOT AL ALL
SUM OF ALL RESPONSES TO PHQ QUESTIONS 1-9: 6
2. FEELING DOWN, DEPRESSED, IRRITABLE, OR HOPELESS: NOT AT ALL

## 2017-10-16 ASSESSMENT — PAIN SCALES - GENERAL: PAINLEVEL_OUTOF10: 0

## 2017-10-17 ENCOUNTER — APPOINTMENT (OUTPATIENT)
Dept: RADIOLOGY | Facility: MEDICAL CENTER | Age: 67
DRG: 304 | End: 2017-10-17
Attending: INTERNAL MEDICINE
Payer: MEDICARE

## 2017-10-17 PROBLEM — I50.33 ACUTE ON CHRONIC DIASTOLIC CHF (CONGESTIVE HEART FAILURE) (HCC): Status: ACTIVE | Noted: 2017-10-16

## 2017-10-17 PROBLEM — J96.01 ACUTE HYPOXEMIC RESPIRATORY FAILURE (HCC): Status: ACTIVE | Noted: 2017-10-17

## 2017-10-17 LAB
ALBUMIN SERPL BCP-MCNC: 3 G/DL (ref 3.2–4.9)
ALBUMIN/GLOB SERPL: 1.3 G/DL
ALP SERPL-CCNC: 71 U/L (ref 30–99)
ALT SERPL-CCNC: 24 U/L (ref 2–50)
ANION GAP SERPL CALC-SCNC: 9 MMOL/L (ref 0–11.9)
AST SERPL-CCNC: 25 U/L (ref 12–45)
BILIRUB SERPL-MCNC: 0.4 MG/DL (ref 0.1–1.5)
BUN SERPL-MCNC: 23 MG/DL (ref 8–22)
CALCIUM SERPL-MCNC: 8.1 MG/DL (ref 8.4–10.2)
CHLORIDE SERPL-SCNC: 98 MMOL/L (ref 96–112)
CO2 SERPL-SCNC: 30 MMOL/L (ref 20–33)
CREAT SERPL-MCNC: 5.74 MG/DL (ref 0.5–1.4)
ERYTHROCYTE [DISTWIDTH] IN BLOOD BY AUTOMATED COUNT: 44.9 FL (ref 35.9–50)
GFR SERPL CREATININE-BSD FRML MDRD: 7 ML/MIN/1.73 M 2
GLOBULIN SER CALC-MCNC: 2.4 G/DL (ref 1.9–3.5)
GLUCOSE BLD-MCNC: 100 MG/DL (ref 65–99)
GLUCOSE BLD-MCNC: 103 MG/DL (ref 65–99)
GLUCOSE BLD-MCNC: 157 MG/DL (ref 65–99)
GLUCOSE BLD-MCNC: 215 MG/DL (ref 65–99)
GLUCOSE SERPL-MCNC: 92 MG/DL (ref 65–99)
HCT VFR BLD AUTO: 22.2 % (ref 37–47)
HGB BLD-MCNC: 7.4 G/DL (ref 12–16)
LV EJECT FRACT  99904: 60
MCH RBC QN AUTO: 30.5 PG (ref 27–33)
MCHC RBC AUTO-ENTMCNC: 33.3 G/DL (ref 33.6–35)
MCV RBC AUTO: 91.4 FL (ref 81.4–97.8)
PLATELET # BLD AUTO: 122 K/UL (ref 164–446)
PMV BLD AUTO: 12.2 FL (ref 9–12.9)
POTASSIUM SERPL-SCNC: 4.1 MMOL/L (ref 3.6–5.5)
PROT SERPL-MCNC: 5.4 G/DL (ref 6–8.2)
RBC # BLD AUTO: 2.43 M/UL (ref 4.2–5.4)
SODIUM SERPL-SCNC: 137 MMOL/L (ref 135–145)
TROPONIN I SERPL-MCNC: 0.04 NG/ML (ref 0–0.04)
TROPONIN I SERPL-MCNC: 0.04 NG/ML (ref 0–0.04)
WBC # BLD AUTO: 3.3 K/UL (ref 4.8–10.8)

## 2017-10-17 PROCEDURE — 85027 COMPLETE CBC AUTOMATED: CPT

## 2017-10-17 PROCEDURE — 700111 HCHG RX REV CODE 636 W/ 250 OVERRIDE (IP): Performed by: INTERNAL MEDICINE

## 2017-10-17 PROCEDURE — 770020 HCHG ROOM/CARE - TELE (206)

## 2017-10-17 PROCEDURE — 93306 TTE W/DOPPLER COMPLETE: CPT

## 2017-10-17 PROCEDURE — 90935 HEMODIALYSIS ONE EVALUATION: CPT

## 2017-10-17 PROCEDURE — 5A1D70Z PERFORMANCE OF URINARY FILTRATION, INTERMITTENT, LESS THAN 6 HOURS PER DAY: ICD-10-PCS | Performed by: INTERNAL MEDICINE

## 2017-10-17 PROCEDURE — 700102 HCHG RX REV CODE 250 W/ 637 OVERRIDE(OP): Performed by: HOSPITALIST

## 2017-10-17 PROCEDURE — 700111 HCHG RX REV CODE 636 W/ 250 OVERRIDE (IP)

## 2017-10-17 PROCEDURE — A9270 NON-COVERED ITEM OR SERVICE: HCPCS | Performed by: INTERNAL MEDICINE

## 2017-10-17 PROCEDURE — A9502 TC99M TETROFOSMIN: HCPCS

## 2017-10-17 PROCEDURE — 84484 ASSAY OF TROPONIN QUANT: CPT

## 2017-10-17 PROCEDURE — 94760 N-INVAS EAR/PLS OXIMETRY 1: CPT

## 2017-10-17 PROCEDURE — A9270 NON-COVERED ITEM OR SERVICE: HCPCS | Performed by: HOSPITALIST

## 2017-10-17 PROCEDURE — 93306 TTE W/DOPPLER COMPLETE: CPT | Mod: 26 | Performed by: INTERNAL MEDICINE

## 2017-10-17 PROCEDURE — 700102 HCHG RX REV CODE 250 W/ 637 OVERRIDE(OP): Performed by: INTERNAL MEDICINE

## 2017-10-17 PROCEDURE — 80053 COMPREHEN METABOLIC PANEL: CPT

## 2017-10-17 PROCEDURE — 82962 GLUCOSE BLOOD TEST: CPT

## 2017-10-17 PROCEDURE — 99232 SBSQ HOSP IP/OBS MODERATE 35: CPT | Performed by: HOSPITALIST

## 2017-10-17 RX ORDER — AMINOPHYLLINE 25 MG/ML
INJECTION, SOLUTION INTRAVENOUS
Status: COMPLETED
Start: 2017-10-17 | End: 2017-10-17

## 2017-10-17 RX ORDER — LISINOPRIL 10 MG/1
10 TABLET ORAL DAILY
Qty: 30 TAB | Refills: 2 | Status: SHIPPED | OUTPATIENT
Start: 2017-10-17 | End: 2017-11-03

## 2017-10-17 RX ORDER — LISINOPRIL 5 MG/1
10 TABLET ORAL
Status: DISCONTINUED | OUTPATIENT
Start: 2017-10-17 | End: 2017-10-18 | Stop reason: HOSPADM

## 2017-10-17 RX ORDER — LIDOCAINE HYDROCHLORIDE 10 MG/ML
1 INJECTION, SOLUTION INFILTRATION; PERINEURAL PRN
Status: DISCONTINUED | OUTPATIENT
Start: 2017-10-17 | End: 2017-10-18 | Stop reason: HOSPADM

## 2017-10-17 RX ORDER — ENALAPRILAT 1.25 MG/ML
1.25 INJECTION INTRAVENOUS EVERY 6 HOURS PRN
Status: DISCONTINUED | OUTPATIENT
Start: 2017-10-17 | End: 2017-10-18 | Stop reason: HOSPADM

## 2017-10-17 RX ORDER — REGADENOSON 0.08 MG/ML
INJECTION, SOLUTION INTRAVENOUS
Status: COMPLETED
Start: 2017-10-17 | End: 2017-10-17

## 2017-10-17 RX ADMIN — ATORVASTATIN CALCIUM 20 MG: 40 TABLET, FILM COATED ORAL at 21:00

## 2017-10-17 RX ADMIN — CLOPIDOGREL BISULFATE 75 MG: 75 TABLET ORAL at 08:11

## 2017-10-17 RX ADMIN — HEPARIN SODIUM 5000 UNITS: 5000 INJECTION, SOLUTION INTRAVENOUS; SUBCUTANEOUS at 06:44

## 2017-10-17 RX ADMIN — INSULIN LISPRO 3 UNITS: 100 INJECTION, SOLUTION INTRAVENOUS; SUBCUTANEOUS at 12:27

## 2017-10-17 RX ADMIN — AMLODIPINE BESYLATE 10 MG: 5 TABLET ORAL at 08:10

## 2017-10-17 RX ADMIN — STANDARDIZED SENNA CONCENTRATE AND DOCUSATE SODIUM 2 TABLET: 8.6; 5 TABLET, FILM COATED ORAL at 21:16

## 2017-10-17 RX ADMIN — METOPROLOL TARTRATE 25 MG: 50 TABLET, FILM COATED ORAL at 21:16

## 2017-10-17 RX ADMIN — FUROSEMIDE 40 MG: 10 INJECTION, SOLUTION INTRAVENOUS at 08:11

## 2017-10-17 RX ADMIN — LISINOPRIL 10 MG: 5 TABLET ORAL at 17:19

## 2017-10-17 RX ADMIN — ASPIRIN 325 MG ORAL TABLET 325 MG: 325 PILL ORAL at 08:10

## 2017-10-17 RX ADMIN — INSULIN LISPRO 2 UNITS: 100 INJECTION, SOLUTION INTRAVENOUS; SUBCUTANEOUS at 21:16

## 2017-10-17 RX ADMIN — HEPARIN SODIUM 5000 UNITS: 5000 INJECTION, SOLUTION INTRAVENOUS; SUBCUTANEOUS at 22:00

## 2017-10-17 RX ADMIN — AMINOPHYLLINE 100 MG: 25 INJECTION, SOLUTION INTRAVENOUS at 09:40

## 2017-10-17 RX ADMIN — REGADENOSON 0.4 MG: 0.08 INJECTION, SOLUTION INTRAVENOUS at 09:34

## 2017-10-17 RX ADMIN — TRAZODONE HYDROCHLORIDE 50 MG: 50 TABLET ORAL at 21:16

## 2017-10-17 ASSESSMENT — COPD QUESTIONNAIRES
DO YOU EVER COUGH UP ANY MUCUS OR PHLEGM?: NO/ONLY WITH OCCASIONAL COLDS OR INFECTIONS
HAVE YOU SMOKED AT LEAST 100 CIGARETTES IN YOUR ENTIRE LIFE: NO/DON'T KNOW
COPD SCREENING SCORE: 2
DURING THE PAST 4 WEEKS HOW MUCH DID YOU FEEL SHORT OF BREATH: NONE/LITTLE OF THE TIME

## 2017-10-17 ASSESSMENT — ENCOUNTER SYMPTOMS
SHORTNESS OF BREATH: 1
CONSTIPATION: 0
CHILLS: 0
NAUSEA: 0
FEVER: 0
WHEEZING: 0
COUGH: 0
VOMITING: 0
DIARRHEA: 0
HEADACHES: 0
HEMOPTYSIS: 0

## 2017-10-17 ASSESSMENT — PAIN SCALES - GENERAL
PAINLEVEL_OUTOF10: 0
PAINLEVEL_OUTOF10: 0

## 2017-10-17 ASSESSMENT — LIFESTYLE VARIABLES: EVER_SMOKED: NEVER

## 2017-10-17 NOTE — PROGRESS NOTES
Assessment completed. Pt Venezuelan speaking,  used, Phuong # [id number]. Pt is AAOx4, no complaints of pain at this time. Due meds given, Lasix OK per MD. Pt on 2 L O2, crackles/wheezes in lung bases, pt has congested, coarse cough. Pt taken down to nuc med via WC for stress test. Will monitor.

## 2017-10-17 NOTE — PROGRESS NOTES
2033: Pt arrived to the floor via gurney in stable condition with family at the bedside. Pt able to ambulate to the bed without issue. CNA at the bedside to get pt settled into bed and get vital signs.   2100: Admit profile complete with assistance in translation by family per pt request. Pt and family introduced to unit routine, MD orders, and medications. All questions and concerns addressed. Partial assessment complete.  2225: Full assessment complete and pt medicated per MD order. Pt vaccinated for flu and pna. VIS provided in [place].   0100: Pt sleeping with no s/s of distress.   0630: Pt medicated per MD order.   0715: Dr Evangelist andersen for high BP and only having beta blockers ordered. Orders received.

## 2017-10-17 NOTE — ED NOTES
"Chief Complaint   Patient presents with   • Blood Pressure Problem   • Weakness   • Chest Pain     per pt \"feels suffocating\"      ..BP (!) 176/78   Pulse 82   Temp 36.8 °C (98.3 °F)   Resp 18   Ht 1.626 m (5' 4\")   Wt 60.2 kg (132 lb 11.5 oz)   LMP  (LMP Unknown)   SpO2 (!) 87%   BMI 22.78 kg/m²     Pt had dialysis today, c/o increasing weakness and CP.  Pt taken for EKG and to see ERP  "

## 2017-10-17 NOTE — PROGRESS NOTES
Tele summary  Rhythm: SR/ SA  Rate: 60's-70's  NC: 0.16  QRS: 0.08  QT: 0.44    Ectopy: Frequent PAC. Rare PVC    Telemetry strips placed in pt chart.

## 2017-10-17 NOTE — DISCHARGE PLANNING
Medical Social Work    Referral: NAHOMI reviewed the chart this AM.      Intervention: Per flowsheet, pt lives with adult child and expects to d.c home.  No SS or DC needs at this time.      Plan: NAHOMI Todd available to assist with any d.c planning.      Care Transition Team Assessment    Information Source  Information Given By: Patient    Readmission Evaluation  Is this a readmission?: No    Elopement Risk  Legal Hold: No  Ambulatory or Self Mobile in Wheelchair: Yes  Disoriented: No  Psychiatric Symptoms: None  History of Wandering: No  Elopement this Admit: No  Vocalizing Wanting to Leave: No  Displays Behaviors, Body Language Wanting to Leave: No-Not at Risk for Elopement  Elopement Risk: Not at Risk for Elopement    Interdisciplinary Discharge Planning  Does Admitting Nurse Feel This Could be a Complex Discharge?: No  Lives with - Patient's Self Care Capacity: Adult Children  Patient or legal guardian wants to designate a caregiver (see row info): No  Support Systems: Children, Family Member(s)  Housing / Facility: [address]  Do You Take your Prescribed Medications Regularly: Yes  Able to Return to Previous ADL's: Yes  Mobility Issues: Yes  Prior Services: None  Patient Expects to be Discharged to:: home  Assistance Needed: Unknown at this Time  Durable Medical Equipment: Not Applicable    Discharge Preparedness  What is your plan after discharge?: Uncertain - pending medical team collaboration  What are your discharge supports?: Child              Vision / Hearing Impairment  Vision Impairment : Yes  Right Eye Vision: Impaired, Wears Glasses  Left Eye Vision: Impaired, Wears Glasses  Hearing Impairment : No    Values / Beliefs / Concerns  Values / Beliefs Concerns : No    Advance Directive  Advance Directive?: None    Domestic Abuse  Have you ever been the victim of abuse or violence?: No  Physical Abuse or Sexual Abuse: No  Verbal Abuse or Emotional Abuse: No  Possible Abuse Reported to:: Not  Applicable    Psychological Assessment  History of Substance Abuse: None

## 2017-10-17 NOTE — PROGRESS NOTES
Spoke with nancy gary, pt to be dialyzed around 1200--1300. Pt remains downstairs in stress test at this time, dialysis center notified of pt's other testing today.

## 2017-10-17 NOTE — PROGRESS NOTES
Nursing care plan includes knowledge deficit, potential for discomfort, potential for compromised cardiac output. POC includes teaching, comfort measures and reassurance, and access to code cart, cardiology stand by and availability of rapid response team. Pt verbalizes good understanding of benefits and risks of pharmacological cardiac stress test. Informed consent obtained.Lexiscan given, pt developed following symptoms: SOB and nausea/vomiting. Reversed with Aminophylline per protocol. VS stable, symptoms resolved. To waiting room,  Fluids and/or snack given, awaiting second scan.Nursing goals met.

## 2017-10-17 NOTE — H&P
PRIMARY CARE PHYSICIAN:  ALEYDA Ray.    CHIEF COMPLAINT:  Generalized weakness, high blood pressure, and shortness of   breath.    HISTORY OF PRESENT ILLNESS:  Patient is a 66-year-old female with past medical   history of coronary artery disease, end-stage renal disease, who comes to the   ER today complaining of elevated blood pressure, kind of intermittent on and   off chest pain, going on for like 2 weeks and also some shortness of breath.    Patient states that she has this intermittent on and off chest pain on the   left side for like 8 days, 3/10 in intensity.  She is also having some cough   for the last couple of weeks and also short of breath.  She denies any fever   or chills.  No headaches, feeling little bit lightheaded, some nausea.  Here   in the ER, patient was found to have uncontrolled blood pressure and also   elevated BNP.    REVIEW OF SYSTEMS:  All negative except as per HPI.    PAST MEDICAL HISTORY:  History of end-stage renal disease, coronary artery   disease, diabetes, and hypertension.    PAST SURGICAL HISTORY:  _____ PCI.    ALLERGIES:  No known allergies.    SOCIAL HISTORY:  Denies any alcohol, tobacco, or drug use.    FAMILY HISTORY:  Reviewed and noncontributory to the case.    HOME MEDICATIONS:  Lipitor 20 mg every evening, amlodipine 10 mg every day,   trazodone 50 mg every bedtime, Lasix 40 mg every day, Plavix 75 mg every day,   aspirin 81 mg day, glipizide 5 mg every day, and metoprolol 25 mg twice a day.    PHYSICAL EXAMINATION:  VITAL SIGNS:  Blood pressure 234/81, respiration 18, pulse 82, temp 98.3.  GENERAL:  No acute distress, nontoxic appearance.  HEENT:  Normocephalic, atraumatic.  Pupils are equal, round and reactive to   light.  NECK:  Supple, no adenopathy.  CARDIOVASCULAR:  S1, S2 normal.  No murmurs or gallops appreciated.  LUNGS:  Clear to auscultation bilaterally.  No rales, rhonchi or wheezing.  ABDOMEN:  Soft, nontender.  Positive bowel  sounds.  EXTREMITIES:  No edema, cyanosis, or clubbing.  NEUROLOGIC:  No focal deficit.  Alert and oriented x4.    LABORATORY DATA:  WBC 3.4, hemoglobin 8.7, hematocrit 25.7, and platelet 142.    Sodium 138, potassium is 4.2, chloride 95, bicarbonate 32, glucose 127, BUN   23, creatinine 4.  Troponin is 0.05.    IMAGING:  Chest x-ray showing cardiomegaly, some small pleural effusion   collection.    ASSESSMENT AND PLAN:  1.  Acute congestive heart failure.  Patient never had diagnosis of that, but   her BNP here is around 2000, this could also be related to her end-stage renal   disease.  We will repeat an echocardiogram at this time.  We will start her   on IV Lasix and we will continue to monitor.  2.  Intermittent on and off chest pain.  EKG did not show any ST changes, but   we will trend troponin at this time.  We will also order a stress test in the   morning.  3.  Hypertension emergency.  Patient comes here with elevated blood pressure,   uncontrolled.  We will continue her outpatient regimen.  We will also start   labetalol IV as needed.  She already received a dose in the ER and her blood   pressure is better.  3.  Elevated troponin.  This could be due to her end-stage renal disease or   due to her hypertension emergency.  We will continue to trend at this time.    We will also do a stress test and an echo and we will continue to monitor on   tele.  She also has a history of coronary artery disease.  4.  History of end-stage renal disease.  She had dialysis today.  Nephrology,   Dr. Burnett has already been consulted and formally the patient will be   admitted.  5.  History of diabetes.  We will start patient on sliding scale and   Accu-Cheks.  6.  History of coronary artery disease.  We will continue Plavix, aspirin, and   metoprolol.  Continue to monitor.  7.  Prophylactic deep venous thrombosis, we will start patient on heparin   subcutaneously.  8.  Code status:  Patient is a full code.        ____________________________________     MD GABRIEL NELSON    DD:  10/16/2017 20:34:54  DT:  10/16/2017 23:48:16    D#:  [id number]  Job#:  [id number]

## 2017-10-17 NOTE — PROGRESS NOTES
Bedside report received from Abena ROBLES @ 5698. Assumed care. POC discussed. Pt resting comfortably in bed. Safety precautions in place.

## 2017-10-17 NOTE — ED NOTES
"Chief Complaint   Patient presents with   • Blood Pressure Problem   • Weakness   • Chest Pain     per pt \"feels suffocating\"      BP (!) 176/78   Pulse 82   Temp 36.8 °C (98.3 °F)   Resp 18   Ht 1.626 m (5' 4\")   Wt 60.2 kg (132 lb 11.5 oz)   LMP  (LMP Unknown)   SpO2 (!) 87%   BMI 22.78 kg/m²     "

## 2017-10-17 NOTE — CARE PLAN
Problem: Safety  Goal: Will remain free from falls  Outcome: PROGRESSING AS EXPECTED  High fall risk precautions in place. Pt encouraged to use call light before getting out of bed. Pt verbalized understanding and able to return demonstrate how to use call light. Bed alarm on.    Problem: Infection  Goal: Will remain free from infection  Outcome: PROGRESSING AS EXPECTED  Standard precautions in place with every patient interaction. Frequent handwashing and foaming utilized to prevent spread of infection to patient or staff members.

## 2017-10-17 NOTE — CARE PLAN
Problem: Communication  Goal: The ability to communicate needs accurately and effectively will improve  Outcome: PROGRESSING AS EXPECTED   services utilized for translation and communication. POC discussed, questions/concerns addressed. Pt verbalizes understanding.    Problem: Fluid Volume:  Goal: Will maintain balanced intake and output  Outcome: PROGRESSING AS EXPECTED  Pt on IV Lasix, getting dialyzed today. Pt educated on fluid overload and how it relates to symptoms. Pt verbalizes understanding.

## 2017-10-17 NOTE — PROGRESS NOTES
Tele summary  Rhythm: SR/ SA  Rate: 60's-70's  AZ: 0.16  QRS: 0.08  QT: 0.44    Ectopy: Frequent PVC. Rare PAC    Telemetry strips placed in pt chart.

## 2017-10-17 NOTE — ED PROVIDER NOTES
"ED Provider Note    CHIEF COMPLAINT  Chief Complaint   Patient presents with   • Blood Pressure Problem   • Weakness   • Chest Pain     per pt \"feels suffocating\"        HPI  Tere Swann is a 66 y.o. female With a history of diabetes, dyslipidemia, and end-stage renal disease who presents complaining of chest pain and elevated blood pressure.      Patient states she has had pressure in the left side of her chest intermittently for 8 days. She also reports shortness of breath with dyspnea on exertion and a cough that is just recently productive of yellow sputum. She denies fever, chills, diarrhea, rash, sick contacts but admits that she vomited several times on Friday and since then has had continued nausea without an appetite.    Patient had dialysis earlier today. Her blood pressure was elevated both before and after.    Patient denies headache, blurred vision, weakness of her extremities.    ALLERGIES  No Known Allergies    CURRENT MEDICATIONS  Home Medications     Reviewed by Nanda Griggs R.N. (Registered Nurse) on 10/16/17 at 1853  Med List Status: Complete   Medication Last Dose Status   amlodipine (NORVASC) 10 MG Tab 10/16/2017 Active   aspirin (ASA) 81 MG Chew Tab chewable tablet 10/15/2017 Active   atorvastatin (LIPITOR) 20 MG Tab 10/15/2017 Active   clopidogrel (PLAVIX) 75 MG Tab 10/15/2017 Active   furosemide (LASIX) 40 MG Tab nottaking Active   glipiZIDE (GLUCOTROL) 5 MG Tab 10/15/2017 Active   metoprolol (LOPRESSOR) 25 MG Tab 10/15/2017 Active   Sevelamer Carbonate 800 MG Tab 10/5/2017 Active   trazodone (DESYREL) 50 MG Tab not taking Active                PAST MEDICAL HISTORY   has a past medical history of Abnormal cardiovascular stress test; Blood clotting disorder (CMS-[facility]); Dental disorder; Diabetes (CMS-HCC); Dialysis patient (CMS-HCC); High cholesterol; Hyperlipidemia; Hypertension; Kidney disease; and Kidney transplant candidate.    SURGICAL HISTORY   has a past surgical history that " "includes recovery (8/16/2016); other abdominal surgery; other; cardiac cath (8/16/16); and cardiac cath (9/7/2016).    SOCIAL HISTORY  Social History     Social History Main Topics   • Smoking status: Never Smoker   • Smokeless tobacco: Never Used   • Alcohol use No   • Drug use: No   • Sexual activity: Not on file         REVIEW OF SYSTEMS  See HPI for further details.  All other systems are negative except as above in HPI.    PHYSICAL EXAM  VITAL SIGNS: BP (!) 176/78   Pulse 82   Temp 36.8 °C (98.3 °F)   Resp 18   Ht 1.626 m (5' 4\")   Wt 60.2 kg (132 lb 11.5 oz)   LMP  (LMP Unknown)   SpO2 (!) 87%   BMI 22.78 kg/m²     General:  WDWN, nontoxic appearing in NAD; A+Ox3; V/S as above   Skin: warm and dry; good color; no rash  HEENT: NCAT; EOMs intact; PERRL; no scleral icterus   Neck: FROM; no meningismus, no JVD  Cardiovascular: Regular heart rate and rhythm.  No murmurs, rubs, or gallops; pulses 2+ bilaterally radially and DP areas  Lungs: Clear to auscultation with good air movement bilaterally.  No wheezes, rhonchi, or rales.   Abdomen: BS present; soft; NTND; no rebound, guarding, or rigidity.  No organomegaly or pulsatile mass  Extremities: COLLINS x 4; no e/o trauma; no pedal edema; neg Braulio's  Neurologic: CNs III-XII grossly intact; speech clear; distal sensation intact; strength 5/5 UE/LEs  Psychiatric: Appropriate affect, normal mood    LABS  Results for orders placed or performed during the hospital encounter of 10/16/17   CBC WITH DIFFERENTIAL   Result Value Ref Range    WBC 3.4 (L) 4.8 - 10.8 K/uL    RBC 2.86 (L) 4.20 - 5.40 M/uL    Hemoglobin 8.7 (L) 12.0 - 16.0 g/dL    Hematocrit 25.7 (L) 37.0 - 47.0 %    MCV 89.9 81.4 - 97.8 fL    MCH 30.4 27.0 - 33.0 pg    MCHC 33.9 33.6 - 35.0 g/dL    RDW 43.9 35.9 - 50.0 fL    Platelet Count 142 (L) 164 - 446 K/uL    MPV 12.2 9.0 - 12.9 fL    Neutrophils-Polys 74.80 (H) 44.00 - 72.00 %    Lymphocytes 16.70 (L) 22.00 - 41.00 %    Monocytes 6.10 0.00 - 13.40 % "    Eosinophils 1.50 0.00 - 6.90 %    Basophils 0.30 0.00 - 1.80 %    Immature Granulocytes 0.60 0.00 - 0.90 %    Nucleated RBC 0.00 /100 WBC    Neutrophils (Absolute) 2.56 2.00 - 7.15 K/uL    Lymphs (Absolute) 0.57 (L) 1.00 - 4.80 K/uL    Monos (Absolute) 0.21 0.00 - 0.85 K/uL    Eos (Absolute) 0.05 0.00 - 0.51 K/uL    Baso (Absolute) 0.01 0.00 - 0.12 K/uL    Immature Granulocytes (abs) 0.02 0.00 - 0.11 K/uL    NRBC (Absolute) 0.00 K/uL   CMP   Result Value Ref Range    Sodium 138 135 - 145 mmol/L    Potassium 4.2 3.6 - 5.5 mmol/L    Chloride 95 (L) 96 - 112 mmol/L    Co2 32 20 - 33 mmol/L    Anion Gap 11.0 0.0 - 11.9    Glucose 127 (H) 65 - 99 mg/dL    Bun 23 (H) 8 - 22 mg/dL    Creatinine 4.77 (H) 0.50 - 1.40 mg/dL    Calcium 9.3 8.4 - 10.2 mg/dL    AST(SGOT) 33 12 - 45 U/L    ALT(SGPT) 35 2 - 50 U/L    Alkaline Phosphatase 81 30 - 99 U/L    Total Bilirubin 0.9 0.1 - 1.5 mg/dL    Albumin 4.1 3.2 - 4.9 g/dL    Total Protein 7.5 6.0 - 8.2 g/dL    Globulin 3.4 1.9 - 3.5 g/dL    A-G Ratio 1.2 g/dL   TROPONIN   Result Value Ref Range    Troponin I 0.05 (H) 0.00 - 0.04 ng/mL   BNP   Result Value Ref Range    B Natriuretic Peptide 2065 (H) 0 - 100 pg/mL   ESTIMATED GFR   Result Value Ref Range    GFR If  11 (A) >60 mL/min/1.73 m 2    GFR If Non African American 9 (A) >60 mL/min/1.73 m 2   EKG (ER)   Result Value Ref Range    Report       [facility] Emergency Dept.    Test Date:  2017-10-16  Pt Name:    DIOR SUAREZ             Department: [facility]  MRN:        [id number]                      [facility]  Gender:     F                            Technician: MS  :        [id number]                   Requested By:ER TRIAGE PROTOCOL  Order #:    [id number]                    Reading MD:    Measurements  Intervals                                Axis  Rate:       86                           P:          -49  CO:         148                          QRS:        86  QRSD:        83                           T:          25  QT:         425  QTc:        509    Interpretive Statements  Sinus or ectopic atrial rhythm  Borderline right axis deviation  LVH with secondary repolarization abnormality  Prolonged QT interval  Baseline wander in lead(s) V2  Compared to ECG 2016 15:33:28  Ectopic atrial rhythm now present  Left ventricular hypertrophy now present  Early repolarization now present  Pro longed QT interval now present  Sinus bradycardia no longer present     EKG (ER)   Result Value Ref Range    Report       [facility] Emergency Dept.    Test Date:  2017-10-16  Pt Name:    DIOR SUAREZ             Department[facility]  MRN:        [id number]                      [facility]  Gender:     F                            Technician: [id number]  :        1950                   Requested By:DOV FREDERICK  Order #:    [id number]                    Reading MD: DOV FREDERICK MD    Measurements  Intervals                                Axis  Rate:       80                           P:          -6  MT:         155                          QRS:        63  QRSD:       85                           T:          10  QT:         433  QTc:        500    Interpretive Statements  Sinus rhythm  Consider left ventricular hypertrophy  Borderline prolonged QT interval  Compared to ECG 10/16/2017 17:50:43  Ectopic atrial rhythm no longer present  Early repolarization no longer present    Electronically Signed On 10- 19:25:10 PDT by DOV FREDERICK MD         EKG  12 Lead EKG obtained [id number] and interpreted by me to show:  Rhythm: Sinus rhythm   Rate: 86  Intervals:   MT: 148  QRS: 83  QTC: 509  All intervals are within normal limits  No ectopy  Normal axis  No ST changes  Clinical Impression: Sinus rhythm with no acute ST changes  Compared to previous EKG dated 16 which showsNo significant change  EKG has been confirmed in [facility]     12 Lead EKG obtained  at 1836 and interpreted by me to show:  Rhythm: Sinus rhythm   Rate: 80  Intervals:   IA: 155  QRS: 85  QTC: 500  All intervals are within normal limits  No ectopy  Normal axis  ST depression of 1 mm in V5 V6  Clinical Impression: Sinus rhythm with no acute ST changes with minimal ST depression in lateral leads  Compared to previous EKG dated earlier today which shows wandering baseline and possible ST depression in V6  EKG has been confirmed in [facility]         IMAGING  DX-CHEST-PORTABLE (1 VIEW)   Final Result         1.  Cardiomegaly again noted.      2.  Small pleural fluid collections and linear opacifications in each lung base are noted which could indicate edema possibly from CHF.      NM-CARDIAC STRESS TEST    (Results Pending)   Echocardiogram Comp W/O Cont    (Results Pending)         MEDICAL RECORD  I have reviewed patient's medical record and pertinent results are listed below.      COURSE & MEDICAL DECISION MAKING  I have reviewed any medical record information, laboratory studies and radiographic results as noted.    Tere Swann is a 66 y.o. female who presents complaining of elevated blood pressure, chest pain, and cough.      Initial EKG demonstrates no acute ST changes    Repeat EKG demonstrates possible ST depression in V5 and V6    Patient was given labetalol 20 mg IV for hypertensive urgency    Patient's labs demonstrate an anemia that appears stable, elevated creatinine consistent with chronic renal failure, troponin of 0.05, and BNP of 2065 consistent with volume overload. Chest x-ray demonstrates no obvious pulmonary edema.    7:15 PM  Paging hospitalist for admission and Dr. Burnett from nephrology to alert of patient's admission    7:23 PM  I discussed the case with Dr. Burnett who is aware of the patient's admission and will arrange for dialysis    7:27 PM  I discussed the case with Dr. Sutton who agrees to admit the patient.    Pt was re-evaluated at 19:45  Patient denies chest pain.  She states she feels improved from her dizziness. Blood pressure is now 150s systolic.    The total critical care time on this patient is 40 minutes, resuscitating patient, speaking with admitting physician, and deciphering test results. This 40 minutes is exclusive of separately billable procedures.      FINAL IMPRESSION  1. Chest pain, unspecified type    2. Hypertensive urgency    3. Shortness of breath        Electronically signed by: Alisha Richmond, 10/16/2017 6:18 PM

## 2017-10-17 NOTE — PROGRESS NOTES
No changes in pt status. Denies chest pain or any other needs. Dialysis RN at bedside. Family at bedside. POC discussed, pt and family verbalize understanding. Will CTM.

## 2017-10-17 NOTE — CONSULTS
"Consults   Chief Complaint   Patient presents with   • Blood Pressure Problem   • Weakness   • Chest Pain     per pt \"feels suffocating\"      Reason for consult: ESRD, assess need for HD, volume overload  Requesting provider: Dr. Sutton    HPI:66-year-old who is on dialysis Monday Wednesday Friday and came in with elevated blood pressure as well as chest pain. She was felt to be somewhat volume overloaded. She is currently being worked up for her chest pain as well and is scheduled for a stress test.    Past Medical History:   Diagnosis Date   • Abnormal cardiovascular stress test    • Blood clotting disorder (CMS-HCC)     with AVF   • Dental disorder     partial dentures- uppers   • Diabetes (CMS-HCC)     oral medication   • Dialysis patient (CMS-HCC)     [facility]   • High cholesterol    • Hyperlipidemia    • Hypertension    • Kidney disease     renal failure , on dialysis, M, W, F.   • Kidney transplant candidate        Social History   Substance Use Topics   • Smoking status: Never Smoker   • Smokeless tobacco: Never Used   • Alcohol use No       Family History   Problem Relation Age of Onset   • Heart Disease Neg Hx        No Known Allergies    Review of Systems   Constitutional: Negative for chills and fever.   Respiratory: Negative for cough and hemoptysis.        Encounter Vitals  Standard Vitals  Vitals  Blood Pressure : 159/61  Temperature: 36.3 °C (97.4 °F)  Pulse: 80  Respiration: 18  Pulse Oximetry: 92 %  Height: 157.5 cm (5' 2\")  Weight: 67.4 kg (148 lb 9.4 oz)  Encounter Vitals  Temperature: 36.3 °C (97.4 °F)  Temp Source: Oral  Blood Pressure : 159/61  BP Location: Right, Upper Arm  Patient BP Position: Sitting  Pulse: 80  Respiration: 18  Pulse Oximetry: 92 %  O2 (LPM): 2  O2 Delivery: Nasal Cannula  Weight: 67.4 kg (148 lb 9.4 oz)  How Weight Obtained: Bed Scale  Height: 157.5 cm (5' 2\")  BMI (Calculated): 27.18  Breastfeeding Status: No  Pulmonary-Specific Vitals     Durable Medical " Equipment-Specific Vitals  DME  O2 (LPM): 2  O2 Delivery: Nasal Cannula          Physical Exam   Constitutional: She is oriented to person, place, and time and well-developed, well-nourished, and in no distress.   HENT:   Head: Normocephalic and atraumatic.   Cardiovascular: Normal rate and regular rhythm.    Pulmonary/Chest: Effort normal and breath sounds normal.   Musculoskeletal: She exhibits no edema or tenderness.   Neurological: She is alert and oriented to person, place, and time.   Skin: Skin is warm and dry.       LABS:  Recent Labs      10/16/17   1803  10/17/17   0452   WBC  3.4*  3.3*   RBC  2.86*  2.43*   HEMOGLOBIN  8.7*  7.4*   HEMATOCRIT  25.7*  22.2*   MCV  89.9  91.4   MCH  30.4  30.5   RDW  43.9  44.9   PLATELETCT  142*  122*   MPV  12.2  12.2   NEUTSPOLYS  74.80*   --    LYMPHOCYTES  16.70*   --    MONOCYTES  6.10   --    EOSINOPHILS  1.50   --    BASOPHILS  0.30   --      Recent Labs      10/16/17   1803  10/17/17   0452   SODIUM  138  137   POTASSIUM  4.2  4.1   CHLORIDE  95*  98   CO2  32  30   GLUCOSE  127*  92   BUN  23*  23*       STUDIES:    Assessment:  1. End-stage renal disease normally Monday was Friday at [facility]  2. Anemia of chronic kidney disease on Epogen  3. Volume overload  4. Hypertension, uncontrolled likely some component of volume overload  5. Chest pain      Plan:  1. Dialysis today  2. Cardiac workup as noted  3. She will need dialysis again tomorrow to put back on her usual schedule

## 2017-10-18 VITALS
OXYGEN SATURATION: 97 % | DIASTOLIC BLOOD PRESSURE: 53 MMHG | RESPIRATION RATE: 16 BRPM | HEART RATE: 74 BPM | SYSTOLIC BLOOD PRESSURE: 158 MMHG | HEIGHT: 62 IN | BODY MASS INDEX: 27.34 KG/M2 | WEIGHT: 148.59 LBS | TEMPERATURE: 97.5 F

## 2017-10-18 LAB
ANION GAP SERPL CALC-SCNC: 8 MMOL/L (ref 0–11.9)
BUN SERPL-MCNC: 12 MG/DL (ref 8–22)
CALCIUM SERPL-MCNC: 8.7 MG/DL (ref 8.4–10.2)
CHLORIDE SERPL-SCNC: 100 MMOL/L (ref 96–112)
CO2 SERPL-SCNC: 31 MMOL/L (ref 20–33)
CREAT SERPL-MCNC: 4.87 MG/DL (ref 0.5–1.4)
GFR SERPL CREATININE-BSD FRML MDRD: 9 ML/MIN/1.73 M 2
GLUCOSE BLD-MCNC: 108 MG/DL (ref 65–99)
GLUCOSE SERPL-MCNC: 88 MG/DL (ref 65–99)
HAV IGM SERPL QL IA: NEGATIVE
HBV CORE IGM SER QL: NEGATIVE
HBV SURFACE AG SER QL: NEGATIVE
HCV AB SER QL: NEGATIVE
POTASSIUM SERPL-SCNC: 4.5 MMOL/L (ref 3.6–5.5)
SODIUM SERPL-SCNC: 139 MMOL/L (ref 135–145)

## 2017-10-18 PROCEDURE — 99239 HOSP IP/OBS DSCHRG MGMT >30: CPT | Performed by: HOSPITALIST

## 2017-10-18 PROCEDURE — A9270 NON-COVERED ITEM OR SERVICE: HCPCS | Performed by: HOSPITALIST

## 2017-10-18 PROCEDURE — 700111 HCHG RX REV CODE 636 W/ 250 OVERRIDE (IP): Performed by: HOSPITALIST

## 2017-10-18 PROCEDURE — 80048 BASIC METABOLIC PNL TOTAL CA: CPT

## 2017-10-18 PROCEDURE — 700111 HCHG RX REV CODE 636 W/ 250 OVERRIDE (IP): Performed by: INTERNAL MEDICINE

## 2017-10-18 PROCEDURE — A9270 NON-COVERED ITEM OR SERVICE: HCPCS | Performed by: INTERNAL MEDICINE

## 2017-10-18 PROCEDURE — 82962 GLUCOSE BLOOD TEST: CPT

## 2017-10-18 PROCEDURE — 700102 HCHG RX REV CODE 250 W/ 637 OVERRIDE(OP): Performed by: INTERNAL MEDICINE

## 2017-10-18 PROCEDURE — 700102 HCHG RX REV CODE 250 W/ 637 OVERRIDE(OP): Performed by: HOSPITALIST

## 2017-10-18 PROCEDURE — 80074 ACUTE HEPATITIS PANEL: CPT

## 2017-10-18 PROCEDURE — 90935 HEMODIALYSIS ONE EVALUATION: CPT

## 2017-10-18 RX ADMIN — MAGNESIUM HYDROXIDE 30 ML: 400 SUSPENSION ORAL at 00:34

## 2017-10-18 RX ADMIN — RENAGEL 800 MG: 800 TABLET ORAL at 07:51

## 2017-10-18 RX ADMIN — AMLODIPINE BESYLATE 10 MG: 5 TABLET ORAL at 13:17

## 2017-10-18 RX ADMIN — STANDARDIZED SENNA CONCENTRATE AND DOCUSATE SODIUM 2 TABLET: 8.6; 5 TABLET, FILM COATED ORAL at 13:16

## 2017-10-18 RX ADMIN — METOPROLOL TARTRATE 25 MG: 50 TABLET, FILM COATED ORAL at 13:16

## 2017-10-18 RX ADMIN — FUROSEMIDE 40 MG: 10 INJECTION, SOLUTION INTRAVENOUS at 13:06

## 2017-10-18 RX ADMIN — ASPIRIN 325 MG ORAL TABLET 325 MG: 325 PILL ORAL at 13:16

## 2017-10-18 RX ADMIN — HEPARIN SODIUM 5000 UNITS: 5000 INJECTION, SOLUTION INTRAVENOUS; SUBCUTANEOUS at 06:53

## 2017-10-18 RX ADMIN — CLOPIDOGREL BISULFATE 75 MG: 75 TABLET ORAL at 13:16

## 2017-10-18 RX ADMIN — ENALAPRILAT 1.25 MG: 2.5 INJECTION INTRAVENOUS at 00:34

## 2017-10-18 RX ADMIN — LISINOPRIL 10 MG: 5 TABLET ORAL at 13:17

## 2017-10-18 ASSESSMENT — PAIN SCALES - GENERAL
PAINLEVEL_OUTOF10: 9
PAINLEVEL_OUTOF10: 0

## 2017-10-18 ASSESSMENT — ENCOUNTER SYMPTOMS
FEVER: 0
CHILLS: 0
PALPITATIONS: 0

## 2017-10-18 NOTE — PROGRESS NOTES
Pt dialyzed for 3 hrs today from 1402 to 1702.  Pt tolerated tx well and net UF 3.0L.  6-8 mins to achieve hemostasis and dsgs applied to AVF sites.  Family visiting most of tx. See paper dialysis flow sheet for details.  Report given to CORINNA Ray RN

## 2017-10-18 NOTE — ASSESSMENT & PLAN NOTE
- Seen on echo  - Grade 2 diastolic failure  - No systolic failure  - Heart failure protocol  - ACE inhibitor and beta blocker  - Has some volume overload

## 2017-10-18 NOTE — PROGRESS NOTES
RN Night Shift Note:  Patient awakens with severe calf muscle cramps.  She does not have these at home.  O2 sats found to be 84% after patient awake for 10 minutes sitting up rubbing legs.  2.5L O2 NC placed for awake sats of 97%.  Continuous pulse ox on for the remainder of the evening.  Will follow sats.  Hospitalist Dr. Pereira called for orders.  BMP routine this am.       95% on 2.5L NC w/ sleep.  Patient slept well after O2 placed.       Patient asks if she will be receiving medication for her blood glucose when she d/cs.       Patient does not know what CHF is or how to manage it.  This is complicated by her ESRF.  Yoruba CHF education material provided.  Arranged for Yoruba speaking RN to review it with her today.       BG last night 157 and 108 this am.

## 2017-10-18 NOTE — PROGRESS NOTES
Pt dialyzed today, see notes. VS stable. FSBS 100, no insulin per sliding scale. D/c orders received if pt does not need home O2. Pt desaturated to 87% on RA at rest. Family at bedside throughout the day. No additional needs.    Tele strip at 1350 shows SR w/ HR of 70  Measurements: .16/.08/.44    Tele Shift Summary:  Rhythm: SR  Rate: 70's  Ectopy: Per CCT Keyla, pt had no ectopy    Telemetry monitoring strips placed in pt chart.    Bedside report given to Rebecca ROBLES. POC discussed. Pt resting comfortably in bed. Safety precautions in place.

## 2017-10-18 NOTE — PROGRESS NOTES
0700 [facility] at bedside. Pt is resting in bed & eyes closed.    0745 Dialysis nurse is at bedside.    0907 Monitored heart rhythm is accelerated jucntional rhythm (0.08/ 0.08/ 0.48), rate 61.    1100 C/o right leg cramping, common symptom during HD per dialysis nurse.     1134 FSBS 128    1145 HD done.    1530 Discharge instruction and a prescription given to pt, verbalized understanding. IV removed & tip intact. Cardiac monitor returned.Pt discharged to home with personal belongings via w/c, accompanied by a CNA to private car. Granddaughter is with pt.

## 2017-10-18 NOTE — PROGRESS NOTES
Hospital Medicine Progress Note, Adult, Complex               Author: Aftab Burnett Date & Time created: 10/18/2017  2:06 PM     Interval History:  66 year old who came in with CP, known ESRD, some volume overload. Admitted and cardiac workup negative. S/p HD today. Doing well.    Review of Systems:  Review of Systems   Constitutional: Negative for chills and fever.   Cardiovascular: Negative for chest pain and palpitations.       Physical Exam:  Physical Exam   Constitutional: She is oriented to person, place, and time. She appears well-developed and well-nourished.   Cardiovascular: Normal rate and regular rhythm.    Pulmonary/Chest: Effort normal and breath sounds normal.   Musculoskeletal: She exhibits no edema or tenderness.   Neurological: She is alert and oriented to person, place, and time.   Skin: Skin is warm and dry.       Labs:        Invalid input(s): [id number]  Recent Labs      10/16/17   1803  10/17/17   0030  10/17/17   0452   TROPONINI  0.05*  0.04  0.04   BNPBTYPENAT  2065*   --    --      Recent Labs      10/16/17   1803  10/17/17   0452  10/18/17   0449   SODIUM  138  137  139   POTASSIUM  4.2  4.1  4.5   CHLORIDE  95*  98  100   CO2  32  30  31   BUN  23*  23*  12   CREATININE  4.77*  5.74*  4.87*   CALCIUM  9.3  8.1*  8.7     Recent Labs      10/16/17   1803  10/17/17   0452  10/18/17   0449   ALTSGPT  35  24   --    ASTSGOT  33  25   --    ALKPHOSPHAT  81  71   --    TBILIRUBIN  0.9  0.4   --    GLUCOSE  127*  92  88     Recent Labs      10/16/17   1803  10/17/17   0452   RBC  2.86*  2.43*   HEMOGLOBIN  8.7*  7.4*   HEMATOCRIT  25.7*  22.2*   PLATELETCT  142*  122*     Recent Labs      10/16/17   1803  10/17/17   0452   WBC  3.4*  3.3*   NEUTSPOLYS  74.80*   --    LYMPHOCYTES  16.70*   --    MONOCYTES  6.10   --    EOSINOPHILS  1.50   --    BASOPHILS  0.30   --    ASTSGOT  33  25   ALTSGPT  35  24   ALKPHOSPHAT  81  71   TBILIRUBIN  0.9  0.4           Hemodynamics:  Temp (24hrs),  Av.6 °C (97.9 °F), Min:36.4 °C (97.5 °F), Max:36.8 °C (98.2 °F)  Temperature: 36.4 °C (97.5 °F)  Pulse  Av.1  Min: 62  Max: 82   Blood Pressure : 158/53     Respiratory:    Respiration: 16, Pulse Oximetry: 97 %        RUL Breath Sounds: Diminished, RLL Breath Sounds: Fine Crackles, RAMY Breath Sounds: Diminished, LLL Breath Sounds: Diminished  Fluids:    Intake/Output Summary (Last 24 hours) at 10/18/17 1406  Last data filed at 10/18/17 1200   Gross per 24 hour   Intake             2580 ml   Output             6100 ml   Net            -3520 ml        GI/Nutrition:  Orders Placed This Encounter   Procedures   • DIET ORDER     Standing Status:   Standing     Number of Occurrences:   1     Order Specific Question:   Diet:     Answer:   Diabetic [3]     Order Specific Question:   Diet:     Answer:   Renal [8]     Medical Decision Making, by Problem:  Active Hospital Problems    Diagnosis   • Acute hypoxemic respiratory failure (CMS-[facility]) [J96.01]   • ESRD (end stage renal disease) (CMS-[facility]) [N18.6]   • Acute on chronic diastolic CHF (congestive heart failure) (CMS-HCC) [I50.33]   • End stage renal failure on dialysis (CMS-HCC) [N18.6, Z99.2]   • CKD (chronic kidney disease) stage 5, GFR less than 15 ml/min (CMS-HCC) [N18.5]   • Essential hypertension [I10]     1. ESRD - HD done today, doing well  2. CP - noncardiac    D/c today  Will follow up in HDU  Quality-Core Measures

## 2017-10-18 NOTE — PROGRESS NOTES
[facility] Hospitalist Progress Note    Date of Service: 10/17/2017    Chief Complaint  66 y.o. female admitted 10/16/2017 with SOB, CHF    Interval Problem Update  10/17: Removed fluid with dialysis. Added lisinopril 10    Disposition  Pending improvement of oxygenation, still hypoxic     Review of Systems   Constitutional: Negative for chills and fever.   Respiratory: Positive for shortness of breath. Negative for cough and wheezing.    Cardiovascular: Negative for chest pain and leg swelling.   Gastrointestinal: Negative for constipation, diarrhea, nausea and vomiting.   Neurological: Negative for headaches.      Physical Exam  Laboratory/Imaging   Hemodynamics  Temp (24hrs), Av.7 °C (98 °F), Min:36.3 °C (97.4 °F), Max:37.1 °C (98.8 °F)   Temperature: 36.3 °C (97.4 °F)  Pulse  Av.9  Min: 64  Max: 82 Heart Rate (Monitored): 66  Blood Pressure : 159/61      Respiratory      Respiration: 18, Pulse Oximetry: 93 %, O2 Daily Delivery Respiratory : Silicone Nasal Cannula        RUL Breath Sounds: Clear, RML Breath Sounds: Clear, RLL Breath Sounds: Diminished, RAMY Breath Sounds: Clear, LLL Breath Sounds: Diminished    Fluids    Intake/Output Summary (Last 24 hours) at 10/17/17 1948  Last data filed at 10/17/17 1800   Gross per 24 hour   Intake              980 ml   Output             3500 ml   Net            -2520 ml       Nutrition  Orders Placed This Encounter   Procedures   • DIET ORDER     Standing Status:   Standing     Number of Occurrences:   1     Order Specific Question:   Diet:     Answer:   Diabetic [3]     Order Specific Question:   Diet:     Answer:   Renal [8]     Physical Exam   Constitutional: She appears well-developed.   HENT:   Head: Normocephalic.   Cardiovascular: Normal rate.  Exam reveals no gallop.    Pulmonary/Chest: No respiratory distress. She has no wheezes. She has rales.   Abdominal: She exhibits no distension. There is no tenderness.   Musculoskeletal: She exhibits no edema.    Neurological: She is alert.       Recent Labs      10/16/17   1803  10/17/17   0452   WBC  3.4*  3.3*   RBC  2.86*  2.43*   HEMOGLOBIN  8.7*  7.4*   HEMATOCRIT  25.7*  22.2*   MCV  89.9  91.4   MCH  30.4  30.5   MCHC  33.9  33.3*   RDW  43.9  44.9   PLATELETCT  142*  122*   MPV  12.2  12.2     Recent Labs      10/16/17   1803  10/17/17   0452   SODIUM  138  137   POTASSIUM  4.2  4.1   CHLORIDE  95*  98   CO2  32  30   GLUCOSE  127*  92   BUN  23*  23*   CREATININE  4.77*  5.74*   CALCIUM  9.3  8.1*         Recent Labs      10/16/17   1803   BNPBTYPENAT  2065*              Assessment/Plan     Acute hypoxemic respiratory failure (CMS-HCC)- (present on admission)   Assessment & Plan    - Due to heart failure  - Removing fluids with dialysis        Acute on chronic diastolic CHF (congestive heart failure) (CMS-HCC)- (present on admission)   Assessment & Plan    - Seen on echo  - Grade 2 diastolic failure  - No systolic failure  - Heart failure protocol  - ACE inhibitor and beta blocker  - Has some volume overload        ESRD (end stage renal disease) (CMS-HCC)- (present on admission)   Assessment & Plan    - Dialysis per nephro        End stage renal failure on dialysis (CMS-HCC)- (present on admission)   Assessment & Plan    - Dialysis per nephro        CKD (chronic kidney disease) stage 5, GFR less than 15 ml/min (CMS-HCC)- (present on admission)   Assessment & Plan    - Dialysis per nephro        Essential hypertension- (present on admission)   Assessment & Plan    - Added lisinopril  - Continue amlodipine, furosemide            Reviewed items::  Labs reviewed and Medications reviewed  Han catheter::  No Han  DVT prophylaxis pharmacological::  Heparin

## 2017-10-18 NOTE — PROGRESS NOTES
Pt dialyzed for 3 hrs today from 0827 to 1127.  Net UF 2.0 L.  Pt nhad cramping and dizziness near end of tx which was resolved w/ 100ml NS bolus, UF turned off and eventually 300 ml NS rinseback.  Hemostasis achieved within 6 mins of post tx.  DSgs applied to AVF sites.  See paper dialysis flow sheet for details.  Report given to SERGEI Christianson RN.

## 2017-10-18 NOTE — DISCHARGE PLANNING
Medical Social Work    Referral: NAHOMI reviewed the chart this AM.      Intervention: Per flowsheet, pt lives with adult son and expects to d.c home.  No SS or DC needs at this time.      Plan: NAHOMI Hammndy available to assist with any d.c planning.

## 2017-10-18 NOTE — DISCHARGE INSTRUCTIONS
Discharge Instructions    Discharged to home by car with relative. Discharged via wheelchair, hospital escort: Yes.  Special equipment needed: Not Applicable    Be sure to schedule a follow-up appointment with your primary care doctor or any specialists as instructed.     Discharge Plan:   Pneumococcal Vaccine Given - only chart on this line when given: Given (See MAR)  Influenza Vaccine Indication: Indicated: 65 years and older  Influenza Vaccine Given - only chart on this line when given: Influenza Vaccine Given (See MAR)    I understand that a diet low in cholesterol, fat, and sodium is recommended for good health. Unless I have been given specific instructions below for another diet, I accept this instruction as my diet prescription.   Other diet: Renal diet    Special Instructions:   Hipertensión pulmonar  (Pulmonary Hypertension)  La hipertensión pulmonar es la hipertensión arterial en las arterias de los pulmones (arterias pulmonares). Es distinto de tener hipertensión arterial en cualquier otra parte del cuerpo, noemi la tensión arterial que se mide con un tensiómetro. La hipertensión pulmonar dificulta la circulación de la andrae a través de los pulmones. Byron resultado, el corazón debe trabajar más para bombear andrae a través de los pulmones, y respirar puede ser más difícil. Con el tiempo, esto puede debilitar el músculo cardíaco. La hipertensión pulmonar es bernardo afección grave que puede ser fatal.   CAUSAS  Muchas enfermedades diferentes pueden causar hipertensión pulmonar. La hipertensión pulmonar puede clasificarse en julio grupos según la causa:  Rocky 1  Hipertensión pulmonar causada por un crecimiento anormal de pequeños vasos sanguíneos en los pulmones (hipertensión arterial pulmonar). El crecimiento anormal de vasos sanguíneos puede tener causas desconocidas o puede deberse a lo siguiente:  · Transmisión por parte de sanchez de los padres (hereditario).  · Otra enfermedad, noemi enfermedades del tejido  conjuntico (por ejemplo, lupus o esclerodermia) o VIH.  · Ciertas drogas o toxinas.  Orcky 2  Hipertensión pulmonar causada por debilidad de la cámara principal del corazón (ventrículo cody) o por bernardo enfermedad en las válvulas cardíacas.  Rocky 3  Hipertensión pulmonar causada por enfermedad pulmonar o niveles bajos de oxígeno. Las causas de kiya rocky incluyen:  · Enfisema o enfermedad pulmonar obstructiva crónica (EPOC).  · Apnea del sueño sin tratar.  · Fibrosis pulmonar.  Rocky 4  Hipertensión pulmonar causada por coágulos sanguíneos en los pulmones (embolia pulmonar).   Rocky 5  Otras causas de hipertensión pulmonar, noemi anemia drepanocítica o bernardo combinación de varias causas.   SÍNTOMAS  Los síntomas de esta afección incluyen lo siguiente:  · Falta de aire. Puede notar que le falta el aire:  ¨ Al realizar bernardo actividad, noemi caminar.  ¨ Falta de actividad.  · Cansancio o fatiga.  · Mareos o desmayos.  · Latidos cardíacos rápidos o sensación de que el corazón aletea o da un max (palpitaciones).  · Agrandamiento de las venas del crystal.  · Color azulado en los labios y en las puntas de los dedos.  DIAGNÓSTICO  Esta afección se puede diagnosticar a través de lo siguiente:  · Radiografía de tórax.  · Gases en la andrae arterial. Kiya análisis determina la acidez de la andrae, así noemi los niveles de oxígeno y dióxido de carbono en la andrae.  · Tomografía computarizada. Esta prueba puede brindar imágenes detalladas de los pulmones.  · Pruebas de la función pulmonar. Esta prueba mide la cantidad de aire que pueden contener los pulmones. También controla cómo entra y sale el aire de los pulmones.  · Electrocardiograma (ECG). Esta prueba registra la actividad eléctrica del corazón.  · Ecocardiograma. Esta prueba se usa para observar el corazón en movimiento y emiliano cómo está funcionando.  · Cateterismo cardíaco. Esta prueba puede medir la presión en la arteria pulmonar y en el lado derecho del  corazón.  · Biopsia de pulmón. En kiya procedimiento, se analiza bernardo muestra de tejido pulmonar para hallar las causas preexistentes.  TRATAMIENTO  La hipertensión pulmonar no tiene amy, jaki el tratamiento puede ayudar a aliviar los síntomas y a retrasar el progreso de la enfermedad. El tratamiento puede incluir lo siguiente:  · Medicamentos, por ejemplo:  ¨ Medicamentos para la presión arterial.  ¨ Medicamentos para relajar (dilatar) los vasos sanguíneos pulmonares.  ¨ Medicamentos para eliminar el exceso de líquido (diuréticos).  ¨ Anticoagulantes.  · Cirugía. Para la hipertensión pulmonar grave que no responde al tratamiento médico, puede ser necesario un trasplante cardiopulmonar o de pulmón.  INSTRUCCIONES PARA EL CUIDADO EN EL HOGAR  · [place] los medicamentos solamente noemi se lo haya indicado el médico. Estos incluyen los medicamentos recetados y de venta candelaria. [place] todos los medicamentos exactamente noemi se le indicó. No cambie ni interrumpa los medicamentos sin consultar bairon al médico.  · No fume. Si necesita ayuda para dejar de fumar, consulte al médico.  · Consuma bernardo dieta saludable.  · Limite el consumo de sal (sodio) a menos de 2300 mg por día.  · Permanezca activo todo lo posible. Shilpa ejercicio según las indicaciones del médico. Pregúntele al médico qué ejercicios son seguros para usted.  · Evite los lugares muy altos.  · Evite saunas y bañeras de hidromasajes.  · Evite quedar embarazada, si esto se aplica a usted. Hable con el médico sobre los métodos anticonceptivos seguros.  · Concurra a todas las visitas de control noemi se lo haya indicado el médico. [facility] es importante.  SOLICITE ATENCIÓN MÉDICA DE INMEDIATO SI:  · Le falta el aire de manera preocupante.  · Siente dolor u opresión en el pecho.  · Tose y escupe andrae.  · Se le hinchan los pies o las piernas.  · Aumenta de peso considerablemente en 1 o 2 días.     Esta información no tiene noemi fin reemplazar el consejo del médico. Asegúrese  de hacerle al médico cualquier pregunta que tenga.     Document Released: 04/05/2010 Document Revised: 05/03/2016  [employer] Interactive Patient Education ©2016 [employer]      · Is patient discharged on Warfarin / Coumadin?   No     · Is patient Post Blood Transfusion?  No    Depression / Suicide Risk    As you are discharged from this [facility] facility, it is important to learn how to keep safe from harming yourself.    Recognize the warning signs:  · Abrupt changes in personality, positive or negative- including increase in energy   · Giving away possessions  · Change in eating patterns- significant weight changes-  positive or negative  · Change in sleeping patterns- unable to sleep or sleeping all the time   · Unwillingness or inability to communicate  · Depression  · Unusual sadness, discouragement and loneliness  · Talk of wanting to die  · Neglect of personal appearance   · Rebelliousness- reckless behavior  · Withdrawal from people/activities they love  · Confusion- inability to concentrate     If you or a loved one observes any of these behaviors or has concerns about self-harm, here's what you can do:  · Talk about it- your feelings and reasons for harming yourself  · Remove any means that you might use to hurt yourself (examples: pills, rope, extension cords, firearm)  · Get professional help from the community (Mental Health, Substance Abuse, psychological counseling)  · Do not be alone:Call your Safe Contact- someone whom you trust who will be there for you.  · Call your local CRISIS HOTLINE 415-9876 or 804-963-9437  · Call your local Children's Mobile Crisis Response Team [place] (894) 208-1429 or www.[employer]  · Call the toll free National Suicide Prevention Hotlines   · National Suicide Prevention Lifeline 533-745-QGOT (8197)  · National Hope Line Network 800-SUICIDE (736-4794)

## 2017-10-19 ENCOUNTER — PATIENT OUTREACH (OUTPATIENT)
Dept: HEALTH INFORMATION MANAGEMENT | Facility: OTHER | Age: 67
End: 2017-10-19

## 2017-10-19 LAB — GLUCOSE BLD-MCNC: 128 MG/DL (ref 65–99)

## 2017-10-19 NOTE — PROGRESS NOTES
10/19/2017 1520 - Discharge Outreach attempt - LM  10/19/2017 1541 - Discharge Outreach - received callback from son. Call completed.

## 2017-10-19 NOTE — DISCHARGE SUMMARY
Hospital Medicine Discharge Note     Admit Date:  10/16/2017       Discharge Date:   10/18/2017    Attending Physician:  Evaristo Blanca     Diagnoses (includes active and resolved):   Active Problems:    Essential hypertension POA: Yes    CKD (chronic kidney disease) stage 5, GFR less than 15 ml/min (CMS-HCC) POA: Yes    End stage renal failure on dialysis (CMS-HCC) POA: Yes    ESRD (end stage renal disease) (CMS-HCC) POA: Yes    Acute on chronic diastolic CHF (congestive heart failure) (CMS-HCC) POA: Yes    Acute hypoxemic respiratory failure (CMS-HCC) POA: Yes  Resolved Problems:    * No resolved hospital problems. *      Hospital Summary (Brief Narrative):       66-year-old female w/h/o coronary artery disease, end-stage renal disease p/w elevated blood pressure, kind of intermittent on and off chest pain, going on for like 2 weeks and also some shortness of breath.   In the ER, patient was found to have uncontrolled blood pressure and also   elevated BNP.   Patient was admitted for workup. She was found to have acute on chronic diastolic ingested heart failure. Thus she was started on beta blocker and ACE inhibitor. She also had hypertensive emergency likely due to volume overload. She underwent to rounds of dialysis as she had a lot of fluid and a lot was able to be removed. She was able to be titrated off oxygen. She did a stress test that was unremarkable. Echo showed normal EF but grade 2 diastolic dysfunction. She was thus discharged and will have heart failure follow-up.     Consultants:      Nephrologist Dr. Burnett    Procedures:        None    Discharge Medications:           Medication List      START taking these medications      Instructions   lisinopril 10 MG Tabs  Commonly known as:  PRINIVIL   Take 1 Tab by mouth every day.  Dose:  10 mg        CONTINUE taking these medications      Instructions   amlodipine 10 MG Tabs  Commonly known as:  NORVASC   Take 1 Tab by mouth every day.  Dose:  10 mg      aspirin 81 MG Chew chewable tablet  Commonly known as:  ASA   Take 1 Tab by mouth every day.  Dose:  81 mg     atorvastatin 20 MG Tabs  Commonly known as:  LIPITOR   Take 20 mg by mouth every evening.  Dose:  20 mg     clopidogrel 75 MG Tabs  Commonly known as:  PLAVIX   Take 1 Tab by mouth every day.  Dose:  75 mg     furosemide 40 MG Tabs  Commonly known as:  LASIX   Take 1 Tab by mouth every day.  Dose:  40 mg     glipiZIDE 5 MG Tabs  Commonly known as:  GLUCOTROL   Take 1 Tab by mouth every day.  Dose:  5 mg     metoprolol 25 MG Tabs  Commonly known as:  LOPRESSOR   Take 1 Tab by mouth 2 Times a Day.  Dose:  25 mg     Sevelamer Carbonate 800 MG Tabs   Take  by mouth.     trazodone 50 MG Tabs  Commonly known as:  DESYREL   Take 1 Tab by mouth every bedtime.  Dose:  50 mg          Disposition:   Discharge home    Diet:   Cardiac, Low Salt    Activity:   As tolerated    Code status:   Full code    Primary Care Provider:    VASQUEZ Pinon    Follow up appointment details :      PCP in 2 weeks  No follow-up provider specified.  Future Appointments  Date Time Provider Department Center   10/19/2017 2:30 PM CARE MANAGER [facility]   10/26/2017 2:20 PM ANGELITA Michelle [facility]   10/30/2017 12:45 PM Abdiaziz Oneil M.D. [facility] None       Pending Studies:        None    Time spent on discharge day patient visit: 44 minutes    #################################################    Interval history/exam for day of discharge:    Vitals:    10/18/17 0015 10/18/17 0332 10/18/17 0730 10/18/17 1200   BP:  158/58 (!) 161/53 158/53   Pulse:  64 65 74   Resp:  18 16 16   Temp:  36.7 °C (98.1 °F) 36.7 °C (98 °F) 36.4 °C (97.5 °F)   SpO2: 97% 95% 93% 97%   Weight:       Height:         Weight/BMI: Body mass index is 27.18 kg/m².  Pulse Oximetry: 97 %, O2 (LPM): 0, O2 Delivery: None (Room Air)    General:  NAD, Slovenian-speaking  CVS:  RRR  PULM:  CTAB, no respiratory distress    Most Recent Labs:    Lab Results    Component Value Date/Time    WBC 3.3 (L) 10/17/2017 04:52 AM    RBC 2.43 (L) 10/17/2017 04:52 AM    HEMOGLOBIN 7.4 (L) 10/17/2017 04:52 AM    HEMATOCRIT 22.2 (L) 10/17/2017 04:52 AM    MCV 91.4 10/17/2017 04:52 AM    MCH 30.5 10/17/2017 04:52 AM    MCHC 33.3 (L) 10/17/2017 04:52 AM    MPV 12.2 10/17/2017 04:52 AM    NEUTSPOLYS 74.80 (H) 10/16/2017 06:03 PM    LYMPHOCYTES 16.70 (L) 10/16/2017 06:03 PM    MONOCYTES 6.10 10/16/2017 06:03 PM    EOSINOPHILS 1.50 10/16/2017 06:03 PM    BASOPHILS 0.30 10/16/2017 06:03 PM      Lab Results   Component Value Date/Time    SODIUM 139 10/18/2017 04:49 AM    POTASSIUM 4.5 10/18/2017 04:49 AM    CHLORIDE 100 10/18/2017 04:49 AM    CO2 31 10/18/2017 04:49 AM    GLUCOSE 88 10/18/2017 04:49 AM    BUN 12 10/18/2017 04:49 AM    CREATININE 4.87 (H) 10/18/2017 04:49 AM    BUNCREATRAT 5 (L) 10/04/2016 08:39 AM      Lab Results   Component Value Date/Time    ALTSGPT 24 10/17/2017 04:52 AM    ASTSGOT 25 10/17/2017 04:52 AM    ALKPHOSPHAT 71 10/17/2017 04:52 AM    TBILIRUBIN 0.4 10/17/2017 04:52 AM    ALBUMIN 3.0 (L) 10/17/2017 04:52 AM    GLOBULIN 2.4 10/17/2017 04:52 AM    INR 0.97 09/06/2016 10:02 AM     Lab Results   Component Value Date/Time    PROTHROMBTM 13.2 09/06/2016 10:02 AM    INR 0.97 09/06/2016 10:02 AM        Imaging/ Testing:      NM-CARDIAC STRESS TEST   Final Result      Echocardiogram Comp W/O Cont   Final Result      DX-CHEST-PORTABLE (1 VIEW)   Final Result         1.  Cardiomegaly again noted.      2.  Small pleural fluid collections and linear opacifications in each lung base are noted which could indicate edema possibly from CHF.          Instructions:      The patient was instructed to return to the ER in the event of worsening symptoms. I have counseled the patient on the importance of compliance and the patient has agreed to proceed with all medical recommendations and follow up plan indicated above.   The patient understands that all medications come with  benefits and risks. Risks may include permanent injury or death and these risks can be minimized with close reassessment and monitoring.

## 2017-10-30 ENCOUNTER — OFFICE VISIT (OUTPATIENT)
Dept: MEDICAL GROUP | Facility: PHYSICIAN GROUP | Age: 67
End: 2017-10-30
Payer: MEDICARE

## 2017-10-30 DIAGNOSIS — K21.9 GASTROESOPHAGEAL REFLUX DISEASE, ESOPHAGITIS PRESENCE NOT SPECIFIED: ICD-10-CM

## 2017-10-30 DIAGNOSIS — F32.A DEPRESSION, UNSPECIFIED DEPRESSION TYPE: ICD-10-CM

## 2017-10-30 PROCEDURE — 99214 OFFICE O/P EST MOD 30 MIN: CPT | Performed by: NURSE PRACTITIONER

## 2017-11-03 ENCOUNTER — OFFICE VISIT (OUTPATIENT)
Dept: CARDIOLOGY | Facility: MEDICAL CENTER | Age: 67
End: 2017-11-03
Payer: MEDICARE

## 2017-11-03 VITALS
BODY MASS INDEX: 25.72 KG/M2 | OXYGEN SATURATION: 93 % | HEART RATE: 74 BPM | WEIGHT: 131 LBS | DIASTOLIC BLOOD PRESSURE: 70 MMHG | SYSTOLIC BLOOD PRESSURE: 184 MMHG | HEIGHT: 60 IN

## 2017-11-03 DIAGNOSIS — I51.89 LEFT VENTRICULAR DIASTOLIC DYSFUNCTION, NYHA CLASS 3: ICD-10-CM

## 2017-11-03 DIAGNOSIS — I50.30 ACC/AHA STAGE C HEART FAILURE WITH PRESERVED EJECTION FRACTION (HCC): ICD-10-CM

## 2017-11-03 DIAGNOSIS — N18.6 ESRD (END STAGE RENAL DISEASE) (HCC): ICD-10-CM

## 2017-11-03 DIAGNOSIS — I13.10 HYPERTENSIVE HEART AND CHRONIC KIDNEY DISEASE: ICD-10-CM

## 2017-11-03 DIAGNOSIS — Z95.5 S/P CORONARY ARTERY STENT PLACEMENT: ICD-10-CM

## 2017-11-03 DIAGNOSIS — I10 HTN (HYPERTENSION), MALIGNANT: ICD-10-CM

## 2017-11-03 PROCEDURE — 99215 OFFICE O/P EST HI 40 MIN: CPT | Performed by: INTERNAL MEDICINE

## 2017-11-03 RX ORDER — LISINOPRIL 20 MG/1
20 TABLET ORAL DAILY
Qty: 90 TAB | Refills: 3 | Status: SHIPPED | OUTPATIENT
Start: 2017-11-03 | End: 2018-11-08 | Stop reason: SDUPTHER

## 2017-11-03 RX ORDER — HYDROXYZINE HYDROCHLORIDE 25 MG/1
25 TABLET, FILM COATED ORAL
COMMUNITY
End: 2018-02-12 | Stop reason: SDUPTHER

## 2017-11-03 RX ORDER — METOPROLOL TARTRATE 50 MG/1
50 TABLET, FILM COATED ORAL 2 TIMES DAILY
Qty: 60 TAB | Refills: 11 | Status: SHIPPED | OUTPATIENT
Start: 2017-11-03 | End: 2017-11-17 | Stop reason: SDUPTHER

## 2017-11-03 RX ORDER — OMEPRAZOLE 20 MG/1
20 CAPSULE, DELAYED RELEASE ORAL DAILY
COMMUNITY
End: 2018-11-08 | Stop reason: SDUPTHER

## 2017-11-03 ASSESSMENT — ENCOUNTER SYMPTOMS
DIZZINESS: 0
BLOOD IN STOOL: 0
EYE DISCHARGE: 0
CHILLS: 0
SPEECH CHANGE: 0
HEADACHES: 0
BRUISES/BLEEDS EASILY: 0
WEIGHT LOSS: 0
EYE PAIN: 0
HALLUCINATIONS: 0
PND: 0
LOSS OF CONSCIOUSNESS: 0
COUGH: 0
CLAUDICATION: 0
SHORTNESS OF BREATH: 1
NAUSEA: 0
DOUBLE VISION: 0
DEPRESSION: 0
FEVER: 0
SENSORY CHANGE: 0
BLURRED VISION: 0
PALPITATIONS: 0
ABDOMINAL PAIN: 0
FALLS: 0
MYALGIAS: 0
ORTHOPNEA: 0
VOMITING: 0

## 2017-11-03 NOTE — LETTER
[facility]   [address], [address]  [place] [address]  Phone: [phone]  Fax: [phone]              Tere Swann  1950    Encounter Date: 11/3/2017    Shailesh Hernandez M.D.          PROGRESS NOTE:  Subjective:   Tere Swann is a 66 y.o. female who presents today For cardiac care and evaluation after recent hospital stay because of heart failure exacerbation. Of note, patient presented with extremely high blood pressure. She was diagnosed with hypertensive emergency. She has a history of end-stage renal disease for which she is on dialysis at this time. She has normal left ventricular systolic function and no significant valvular disease. She does have history of coronary stenting in the past in the RCA.    Patient still gets winded with daily living activities and exertion. No symptoms at rest.    Past Medical History:   Diagnosis Date   • Abnormal cardiovascular stress test    • Blood clotting disorder (CMS-HCC)     with AVF   • CAD (coronary artery disease)    • Dental disorder     partial dentures- uppers   • Diabetes (CMS-HCC)     oral medication   • Dialysis patient (CMS-HCC)     [facility]   • High cholesterol    • Hyperlipidemia    • Hypertension    • Kidney disease     renal failure , on dialysis, M, W, F.   • Kidney transplant candidate      Past Surgical History:   Procedure Laterality Date   • CARDIAC CATH  9/7/2016    RCA stented with 2 Synergy drug-eluting stents.   • RECOVERY  8/16/2016    Procedure: CATH LAB [facility] WITH POSSIBLE DR. CASTILLO;  Surgeon: [facility];  Location: SURGERY PRE-POST PROC UNIT [facility];  Service:    • CARDIAC CATH  8/16/16    100% RCA   • OTHER Left 2014    left arm upper extremity fistula   • OTHER ABDOMINAL SURGERY      left kidney removed due to cancer     Family History   Problem Relation Age of Onset   • Diabetes Sister    • Other Sister      liver disease   • Diabetes Brother       • Heart Disease Neg Hx      History   Smoking Status   • Never Smoker   Smokeless Tobacco   • Never Used     No Known Allergies  Outpatient Encounter Prescriptions as of 11/3/2017   Medication Sig Dispense Refill   • hydrOXYzine HCl (ATARAX) 25 MG Tab Take 25 mg by mouth 3 times a day as needed for Itching.     • omeprazole (PRILOSEC) 20 MG delayed-release capsule Take 20 mg by mouth every day.     • metoprolol (LOPRESSOR) 50 MG Tab Take 1 Tab by mouth 2 times a day. 60 Tab 11   • lisinopril (PRINIVIL) 20 MG Tab Take 1 Tab by mouth every day. 90 Tab 3   • atorvastatin (LIPITOR) 20 MG Tab Take 20 mg by mouth every evening.     • amlodipine (NORVASC) 10 MG Tab Take 1 Tab by mouth every day. 90 Tab 3   • trazodone (DESYREL) 50 MG Tab Take 1 Tab by mouth every bedtime. 30 Tab 3   • Sevelamer Carbonate 800 MG Tab Take 2,400 mg by mouth 3 times a day.     • clopidogrel (PLAVIX) 75 MG Tab Take 1 Tab by mouth every day. 30 Tab 11   • aspirin (ASA) 81 MG Chew Tab chewable tablet Take 1 Tab by mouth every day. 100 Tab 11   • glipiZIDE (GLUCOTROL) 5 MG Tab Take 1 Tab by mouth every day. 90 Tab 4   • [DISCONTINUED] lisinopril (PRINIVIL) 10 MG Tab Take 1 Tab by mouth every day. 30 Tab 2   • furosemide (LASIX) 40 MG Tab Take 1 Tab by mouth every day. 30 Tab 0   • [DISCONTINUED] metoprolol (LOPRESSOR) 25 MG Tab Take 1 Tab by mouth 2 Times a Day. 60 Tab 11     No facility-administered encounter medications on file as of 11/3/2017.      Review of Systems   Constitutional: Negative for chills, fever, malaise/fatigue and weight loss.   HENT: Negative for ear discharge, ear pain, hearing loss and nosebleeds.    Eyes: Negative for blurred vision, double vision, pain and discharge.   Respiratory: Positive for shortness of breath. Negative for cough.    Cardiovascular: Negative for chest pain, palpitations, orthopnea, claudication, leg swelling and PND.   Gastrointestinal: Negative for abdominal pain, blood in stool, melena, nausea and  vomiting.   Genitourinary: Negative for dysuria and hematuria.   Musculoskeletal: Negative for falls, joint pain and myalgias.   Skin: Negative for itching and rash.   Neurological: Negative for dizziness, sensory change, speech change, loss of consciousness and headaches.   Endo/Heme/Allergies: Negative for environmental allergies. Does not bruise/bleed easily.   Psychiatric/Behavioral: Negative for depression, hallucinations and suicidal ideas.        Objective:   BP (!) 184/70   Pulse 74   Ht 1.524 m (5')   Wt 59.4 kg (131 lb)   LMP  (LMP Unknown)   SpO2 93%   BMI 25.58 kg/m²      Physical Exam   Constitutional: She is oriented to person, place, and time. No distress.   HENT:   Head: Normocephalic and atraumatic.   Eyes: EOM are normal.   Neck: No JVD present.   Cardiovascular: Normal rate, regular rhythm, normal heart sounds and intact distal pulses.  Exam reveals no gallop and no friction rub.    No murmur heard.  Pulmonary/Chest: No respiratory distress. She has no wheezes. She has no rales. She exhibits no tenderness.   Abdominal: She exhibits no distension. There is no tenderness. There is no rebound and no guarding.   Musculoskeletal: She exhibits no edema or tenderness.   Lymphadenopathy:     She has no cervical adenopathy.   Neurological: She is alert and oriented to person, place, and time.   Skin: Skin is dry.   Psychiatric: She has a normal mood and affect.   Nursing note and vitals reviewed.      Assessment:     1. ACC/AHA stage C heart failure with preserved ejection fraction (CMS-HCC)  metoprolol (LOPRESSOR) 50 MG Tab    lisinopril (PRINIVIL) 20 MG Tab   2. Left ventricular diastolic dysfunction, NYHA class 3  metoprolol (LOPRESSOR) 50 MG Tab    lisinopril (PRINIVIL) 20 MG Tab   3. ESRD (end stage renal disease) (CMS-[facility])     4. S/P coronary artery stent placement     5. HTN (hypertension), malignant  metoprolol (LOPRESSOR) 50 MG Tab    lisinopril (PRINIVIL) 20 MG Tab   6. Hypertensive heart  and chronic kidney disease  metoprolol (LOPRESSOR) 50 MG Tab    lisinopril (PRINIVIL) 20 MG Tab       Medical Decision Making:  Today's Assessment / Status / Plan:   Today, based on physical examination findings, patient is euvolemic. No JVD, lungs are clear to auscultation, no pitting edema in bilateral lower extremities, no ascites.    Dry weight is 131 lbs.    Her clinical presentation is consistent with heart failure with preserved left ventricular systolic function. This is likely related to uncontrolled hypertension.    Certainly, having end-stage renal disease doesn't help with her overall condition.    At this time, optimize blood pressure control.  Metoprolol 50 mg by mouth twice a day and lisinopril 20 mg by mouth once a day.  No spironolactone because of end-stage renal disease.    Optimize dialysis as well.        Jordan Mccoy, A.P.N.  [address]  [address]  [facility] [address]  VIA In Basket

## 2017-11-03 NOTE — PROGRESS NOTES
Subjective:   Tere Swann is a 66 y.o. female who presents today For cardiac care and evaluation after recent hospital stay because of heart failure exacerbation. Of note, patient presented with extremely high blood pressure. She was diagnosed with hypertensive emergency. She has a history of end-stage renal disease for which she is on dialysis at this time. She has normal left ventricular systolic function and no significant valvular disease. She does have history of coronary stenting in the past in the RCA.    Patient still gets winded with daily living activities and exertion. No symptoms at rest.    Past Medical History:   Diagnosis Date   • Abnormal cardiovascular stress test    • Blood clotting disorder (CMS-HCC)     with AVF   • CAD (coronary artery disease)    • Dental disorder     partial dentures- uppers   • Diabetes (CMS-HCC)     oral medication   • Dialysis patient (CMS-HCC)     [facility]   • High cholesterol    • Hyperlipidemia    • Hypertension    • Kidney disease     renal failure , on dialysis, M, W, F.   • Kidney transplant candidate      Past Surgical History:   Procedure Laterality Date   • CARDIAC CATH  9/7/2016    RCA stented with 2 Synergy drug-eluting stents.   • RECOVERY  8/16/2016    Procedure: CATH LAB [facility] WITH POSSIBLE DR. CASTILLO;  Surgeon: [facility];  Location: SURGERY PRE-POST PROC UNIT [facility] [place];  Service:    • CARDIAC CATH  8/16/16    100% RCA   • OTHER Left 2014    left arm upper extremity fistula   • OTHER ABDOMINAL SURGERY      left kidney removed due to cancer     Family History   Problem Relation Age of Onset   • Diabetes Sister    • Other Sister      liver disease   • Diabetes Brother    • Heart Disease Neg Hx      History   Smoking Status   • Never Smoker   Smokeless Tobacco   • Never Used     No Known Allergies  Outpatient Encounter Prescriptions as of 11/3/2017   Medication Sig Dispense Refill   • hydrOXYzine HCl (ATARAX) 25 MG Tab Take 25 mg by  mouth 3 times a day as needed for Itching.     • omeprazole (PRILOSEC) 20 MG delayed-release capsule Take 20 mg by mouth every day.     • metoprolol (LOPRESSOR) 50 MG Tab Take 1 Tab by mouth 2 times a day. 60 Tab 11   • lisinopril (PRINIVIL) 20 MG Tab Take 1 Tab by mouth every day. 90 Tab 3   • atorvastatin (LIPITOR) 20 MG Tab Take 20 mg by mouth every evening.     • amlodipine (NORVASC) 10 MG Tab Take 1 Tab by mouth every day. 90 Tab 3   • trazodone (DESYREL) 50 MG Tab Take 1 Tab by mouth every bedtime. 30 Tab 3   • Sevelamer Carbonate 800 MG Tab Take 2,400 mg by mouth 3 times a day.     • clopidogrel (PLAVIX) 75 MG Tab Take 1 Tab by mouth every day. 30 Tab 11   • aspirin (ASA) 81 MG Chew Tab chewable tablet Take 1 Tab by mouth every day. 100 Tab 11   • glipiZIDE (GLUCOTROL) 5 MG Tab Take 1 Tab by mouth every day. 90 Tab 4   • [DISCONTINUED] lisinopril (PRINIVIL) 10 MG Tab Take 1 Tab by mouth every day. 30 Tab 2   • furosemide (LASIX) 40 MG Tab Take 1 Tab by mouth every day. 30 Tab 0   • [DISCONTINUED] metoprolol (LOPRESSOR) 25 MG Tab Take 1 Tab by mouth 2 Times a Day. 60 Tab 11     No facility-administered encounter medications on file as of 11/3/2017.      Review of Systems   Constitutional: Negative for chills, fever, malaise/fatigue and weight loss.   HENT: Negative for ear discharge, ear pain, hearing loss and nosebleeds.    Eyes: Negative for blurred vision, double vision, pain and discharge.   Respiratory: Positive for shortness of breath. Negative for cough.    Cardiovascular: Negative for chest pain, palpitations, orthopnea, claudication, leg swelling and PND.   Gastrointestinal: Negative for abdominal pain, blood in stool, melena, nausea and vomiting.   Genitourinary: Negative for dysuria and hematuria.   Musculoskeletal: Negative for falls, joint pain and myalgias.   Skin: Negative for itching and rash.   Neurological: Negative for dizziness, sensory change, speech change, loss of consciousness and  headaches.   Endo/Heme/Allergies: Negative for environmental allergies. Does not bruise/bleed easily.   Psychiatric/Behavioral: Negative for depression, hallucinations and suicidal ideas.        Objective:   BP (!) 184/70   Pulse 74   Ht 1.524 m (5')   Wt 59.4 kg (131 lb)   LMP  (LMP Unknown)   SpO2 93%   BMI 25.58 kg/m²     Physical Exam   Constitutional: She is oriented to person, place, and time. No distress.   HENT:   Head: Normocephalic and atraumatic.   Eyes: EOM are normal.   Neck: No JVD present.   Cardiovascular: Normal rate, regular rhythm, normal heart sounds and intact distal pulses.  Exam reveals no gallop and no friction rub.    No murmur heard.  Pulmonary/Chest: No respiratory distress. She has no wheezes. She has no rales. She exhibits no tenderness.   Abdominal: She exhibits no distension. There is no tenderness. There is no rebound and no guarding.   Musculoskeletal: She exhibits no edema or tenderness.   Lymphadenopathy:     She has no cervical adenopathy.   Neurological: She is alert and oriented to person, place, and time.   Skin: Skin is dry.   Psychiatric: She has a normal mood and affect.   Nursing note and vitals reviewed.      Assessment:     1. ACC/AHA stage C heart failure with preserved ejection fraction (CMS-HCC)  metoprolol (LOPRESSOR) 50 MG Tab    lisinopril (PRINIVIL) 20 MG Tab   2. Left ventricular diastolic dysfunction, NYHA class 3  metoprolol (LOPRESSOR) 50 MG Tab    lisinopril (PRINIVIL) 20 MG Tab   3. ESRD (end stage renal disease) (CMS-HCC)     4. S/P coronary artery stent placement     5. HTN (hypertension), malignant  metoprolol (LOPRESSOR) 50 MG Tab    lisinopril (PRINIVIL) 20 MG Tab   6. Hypertensive heart and chronic kidney disease  metoprolol (LOPRESSOR) 50 MG Tab    lisinopril (PRINIVIL) 20 MG Tab       Medical Decision Making:  Today's Assessment / Status / Plan:   Today, based on physical examination findings, patient is euvolemic. No JVD, lungs are clear to  auscultation, no pitting edema in bilateral lower extremities, no ascites.    Dry weight is 131 lbs.    Her clinical presentation is consistent with heart failure with preserved left ventricular systolic function. This is likely related to uncontrolled hypertension.    Certainly, having end-stage renal disease doesn't help with her overall condition.    At this time, optimize blood pressure control.  Metoprolol 50 mg by mouth twice a day and lisinopril 20 mg by mouth once a day.  No spironolactone because of end-stage renal disease.    Optimize dialysis as well.

## 2017-11-09 DIAGNOSIS — I10 ESSENTIAL HYPERTENSION, MALIGNANT: ICD-10-CM

## 2017-11-10 ENCOUNTER — OFFICE VISIT (OUTPATIENT)
Dept: CARDIOLOGY | Facility: MEDICAL CENTER | Age: 67
End: 2017-11-10
Payer: MEDICARE

## 2017-11-10 VITALS
BODY MASS INDEX: 22.74 KG/M2 | HEIGHT: 64 IN | HEART RATE: 86 BPM | WEIGHT: 133.2 LBS | DIASTOLIC BLOOD PRESSURE: 70 MMHG | SYSTOLIC BLOOD PRESSURE: 170 MMHG | OXYGEN SATURATION: 90 %

## 2017-11-10 DIAGNOSIS — I10 HTN (HYPERTENSION), MALIGNANT: ICD-10-CM

## 2017-11-10 DIAGNOSIS — Z95.5 S/P CORONARY ARTERY STENT PLACEMENT: ICD-10-CM

## 2017-11-10 DIAGNOSIS — I13.10 HYPERTENSIVE HEART AND CHRONIC KIDNEY DISEASE: ICD-10-CM

## 2017-11-10 DIAGNOSIS — I50.30 ACC/AHA STAGE C HEART FAILURE WITH PRESERVED EJECTION FRACTION (HCC): ICD-10-CM

## 2017-11-10 DIAGNOSIS — I50.9 HEART FAILURE, NYHA CLASS 2 (HCC): ICD-10-CM

## 2017-11-10 DIAGNOSIS — I25.10 CORONARY ARTERY DISEASE INVOLVING NATIVE CORONARY ARTERY OF NATIVE HEART WITHOUT ANGINA PECTORIS: ICD-10-CM

## 2017-11-10 PROCEDURE — 99214 OFFICE O/P EST MOD 30 MIN: CPT | Performed by: NURSE PRACTITIONER

## 2017-11-10 RX ORDER — FUROSEMIDE 40 MG/1
TABLET ORAL
Qty: 90 TAB | Refills: 3 | Status: SHIPPED | OUTPATIENT
Start: 2017-11-10 | End: 2017-11-10

## 2017-11-10 ASSESSMENT — ENCOUNTER SYMPTOMS
DIZZINESS: 0
MYALGIAS: 0
COUGH: 0
ABDOMINAL PAIN: 0
ORTHOPNEA: 0
PND: 0
PALPITATIONS: 1
FEVER: 0
CLAUDICATION: 0
SHORTNESS OF BREATH: 0

## 2017-11-10 NOTE — PROGRESS NOTES
Subjective:   Tere Swann is a 66 y.o. female who presents today for follow up on her HTN and Heart failure with preserved ejection fraction.    Patient was initially seen in our heart failure clinic on 11/3/17 with Dr. Hernandez. During that visit, patient needed better blood pressure control. Her metoprolol was increased to 50 mg twice a day and lisinopril to 20 mg once a day. Patient reports she is unsure if she is taking metoprolol 25 mg or 50 mg and she reports she is taking medication once a day NOT twice a day. Patient does have end-stage renal disease and is receiving dialysis Monday, Wednesday and Friday.    For her symptoms, patient reports some lower extremity swelling and continued fatigue and occ palpitations. Patient denies chest pain, orthopnea, PND, shortness breath or dizziness.     Additonally, patient has the following medical problems:    -CAD:  RCA with left-to-right collateral; NOEMI ×2 years RCA on 9/7/16    -Left kidney cancer    -End-stage renal disease: Receiving dialysis M/W/F    -Hypertension    -Hyperlipidemia      Past Medical History:   Diagnosis Date   • Abnormal cardiovascular stress test    • Blood clotting disorder (CMS-HCC)     with AVF   • CAD (coronary artery disease)    • Dental disorder     partial dentures- uppers   • Diabetes (CMS-HCC)     oral medication   • Dialysis patient (CMS-HCC)     [facility]   • High cholesterol    • Hyperlipidemia    • Hypertension    • Kidney disease     renal failure , on dialysis, M, W, F.   • Kidney transplant candidate      Past Surgical History:   Procedure Laterality Date   • CARDIAC CATH  9/7/2016    RCA stented with 2 Synergy drug-eluting stents.   • RECOVERY  8/16/2016    Procedure: CATH LAB [facility] WITH POSSIBLE DR. CASTILLO;  Surgeon: [facility];  Location: SURGERY PRE-POST PROC UNIT [facility] [place];  Service:    • CARDIAC CATH  8/16/16    100% RCA   • OTHER Left 2014    left arm upper extremity fistula   • OTHER ABDOMINAL  SURGERY      left kidney removed due to cancer     Family History   Problem Relation Age of Onset   • Diabetes Sister    • Other Sister      liver disease   • Diabetes Brother    • Heart Disease Neg Hx      History   Smoking Status   • Never Smoker   Smokeless Tobacco   • Never Used     No Known Allergies  Outpatient Encounter Prescriptions as of 11/10/2017   Medication Sig Dispense Refill   • Multiple Vitamin (MULTI-VITAMIN DAILY PO) Take  by mouth.     • hydrOXYzine HCl (ATARAX) 25 MG Tab Take 25 mg by mouth 3 times a day as needed for Itching.     • omeprazole (PRILOSEC) 20 MG delayed-release capsule Take 20 mg by mouth every day.     • metoprolol (LOPRESSOR) 50 MG Tab Take 1 Tab by mouth 2 times a day. 60 Tab 11   • lisinopril (PRINIVIL) 20 MG Tab Take 1 Tab by mouth every day. 90 Tab 3   • atorvastatin (LIPITOR) 20 MG Tab Take 20 mg by mouth every evening.     • amlodipine (NORVASC) 10 MG Tab Take 1 Tab by mouth every day. 90 Tab 3   • trazodone (DESYREL) 50 MG Tab Take 1 Tab by mouth every bedtime. 30 Tab 3   • furosemide (LASIX) 40 MG Tab Take 1 Tab by mouth every day. 30 Tab 0   • aspirin (ASA) 81 MG Chew Tab chewable tablet Take 1 Tab by mouth every day. 100 Tab 11   • glipiZIDE (GLUCOTROL) 5 MG Tab Take 1 Tab by mouth every day. 90 Tab 4   • [DISCONTINUED] furosemide (LASIX) 40 MG Tab TAKE 1 TABLET BY MOUTH DAILY 90 Tab 3   • Sevelamer Carbonate 800 MG Tab Take 2,400 mg by mouth 3 times a day.     • [DISCONTINUED] clopidogrel (PLAVIX) 75 MG Tab Take 1 Tab by mouth every day. 30 Tab 11     No facility-administered encounter medications on file as of 11/10/2017.      Review of Systems   Constitutional: Positive for malaise/fatigue. Negative for fever.        Decreased appetite   Respiratory: Negative for cough and shortness of breath.    Cardiovascular: Positive for palpitations (occ) and leg swelling. Negative for chest pain, orthopnea, claudication and PND.   Gastrointestinal: Negative for abdominal pain.  "  Musculoskeletal: Negative for myalgias.   Neurological: Negative for dizziness.   All other systems reviewed and are negative.       Objective:   BP (!) 170/70   Pulse 86   Ht 1.626 m (5' 4\")   Wt 60.4 kg (133 lb 3.2 oz)   LMP  (LMP Unknown)   SpO2 90%   BMI 22.86 kg/m²     Physical Exam   Constitutional: She is oriented to person, place, and time. She appears well-developed and well-nourished.   HENT:   Head: Normocephalic and atraumatic.   Eyes: EOM are normal.   Neck: Normal range of motion. Neck supple. No JVD present.   Cardiovascular: Normal rate, regular rhythm, normal heart sounds and intact distal pulses.    No murmur heard.  Pulmonary/Chest: Effort normal and breath sounds normal. No respiratory distress. She has no wheezes. She has no rales.   Abdominal: Soft. Bowel sounds are normal.   Musculoskeletal: Normal range of motion. She exhibits edema (1-2+ Bilateral LE edema).   Neurological: She is alert and oriented to person, place, and time.   Skin: Skin is warm and dry.   Left UE fistula with bruit and thrill   Psychiatric: She has a normal mood and affect.   Nursing note and vitals reviewed.    Lab Results   Component Value Date/Time    CHOLSTRLTOT 135 10/04/2016 08:39 AM    LDL 33 10/04/2016 08:39 AM    HDL 30 (L) 10/04/2016 08:39 AM    TRIGLYCERIDE 362 (H) 10/04/2016 08:39 AM       Lab Results   Component Value Date/Time    SODIUM 139 10/18/2017 04:49 AM    POTASSIUM 4.5 10/18/2017 04:49 AM    CHLORIDE 100 10/18/2017 04:49 AM    CO2 31 10/18/2017 04:49 AM    GLUCOSE 88 10/18/2017 04:49 AM    BUN 12 10/18/2017 04:49 AM    CREATININE 4.87 (H) 10/18/2017 04:49 AM    BUNCREATRAT 5 (L) 10/04/2016 08:39 AM     Lab Results   Component Value Date/Time    ALKPHOSPHAT 71 10/17/2017 04:52 AM    ASTSGOT 25 10/17/2017 04:52 AM    ALTSGPT 24 10/17/2017 04:52 AM    TBILIRUBIN 0.4 10/17/2017 04:52 AM      Myocardial Perfusion Report 6/16/16   NUCLEAR IMAGING INTERPRETATION   1. Small reversible defect seen " only in basal anterolateral wall with    moderate photon reduction. Attenuation defect vs reversible ischemia consider      clinical correlation.   2. Normal left ventricular size, ejection fraction, and wall motion. Normal    left ventricular wall motion, with EF of 82 %. Calculated TID 0.88.   ECG INTERPRETATION   Negative stress ECG for ischemia.       Transthoracic Echo Report 7/13/16  No prior study is available for comparison.   Normal left ventricular chamber size.  Left ventricular ejection fraction is visually estimated to be greater   than 75%.  Grade I diastolic dysfunction.  Mild mitral and tricuspid valve regurgitation.   Normal inferior vena cava size and inspiratory collapse.  Estimated right ventricular systolic pressure  is 25 mmHg.      Transthoracic Echo Report 10/17/17  Moderate concentric left ventricular hypertrophy.  Normal left ventricular systolic function.  Left ventricular ejection fraction is visually estimated to be 60%.  Grade II diastolic dysfunction.  Moderatelydilated left atrium.  Mild mitral regurgitation.  Structurally normal aortic valve without significant stenosis or   regurgitation.  Right ventricular systolic pressure is estimated to be 65 mmHg.  Small pericardial effusion without evidence of hemodynamic compromise.      Myocardial Perfusion Report 10/17/17   NUCLEAR IMAGING INTERPRETATION   Normal myocardial perfusion with no ischemia.   Normal left ventricular wall motion.  LV ejection fraction = 61%.   ECG INTERPRETATION   Negative stress ECG for ischemia.      Coronary Angiogram 9/7/16  POSTOPERATIVE DIAGNOSES:  Status post stenting of the right coronary artery   with 2.5 mm and 2.25 mmSynergy drug-eluting stents with 0% residual stenosis in   stented segment with IRIS-3 flow into moderately diffuse disease, distal right   coronary artery.      Assessment:     1. ACC/AHA stage C heart failure with preserved ejection fraction (CMS-HCC)     2. Heart failure, NYHA class 2  (CMS-[facility])     3. S/P coronary artery stent placement     4. Coronary artery disease involving native coronary artery of native heart without angina pectoris     5. HTN (hypertension), malignant     6. Hypertensive heart and chronic kidney disease         Medical Decision Making:  Today's Assessment / Status / Plan:   1. HFpEF, stage C, class 2: pt does have some LE edema but no JVD today or rales.  -Continue furosemide 40 mg daily  -Continue dialysis  -Encouraged patient to wear compression stockings and elevate lower extremities, lower extremity edema may be due to amlodipine, consider changing medication the future.  -Reinforced s/sx of worsening heart failure with patient and weight monitoring. Pt verbalizes understanding. Pt to call office or RTC if present.     2. CAD:  -Continue 20 Mg Nightly  -Continue aspirin 81 mg daily    3. Hypertension:  -Encouraged patient to start metoprolol 50 mg twice a day (no dose adjustment Due to patient unsure of actual she is taking at home)  -Continue amlodipine 10 mg daily  -Continue lisinopril 20 mg daily    4. ESRD:   -Continue dialysis, followed by nephrology    FU in clinic in 1 week. Sooner if needed.    Patient verbalizes understanding and agrees with the plan of care.     Collaborating MD: Ritesh Marie MD

## 2017-11-17 ENCOUNTER — OFFICE VISIT (OUTPATIENT)
Dept: CARDIOLOGY | Facility: MEDICAL CENTER | Age: 67
End: 2017-11-17
Payer: MEDICARE

## 2017-11-17 VITALS
OXYGEN SATURATION: 92 % | WEIGHT: 131 LBS | SYSTOLIC BLOOD PRESSURE: 158 MMHG | BODY MASS INDEX: 22.36 KG/M2 | HEART RATE: 70 BPM | HEIGHT: 64 IN | DIASTOLIC BLOOD PRESSURE: 66 MMHG | RESPIRATION RATE: 14 BRPM

## 2017-11-17 DIAGNOSIS — I25.10 CORONARY ARTERY DISEASE INVOLVING NATIVE CORONARY ARTERY OF NATIVE HEART WITHOUT ANGINA PECTORIS: ICD-10-CM

## 2017-11-17 DIAGNOSIS — I50.30 ACC/AHA STAGE C HEART FAILURE WITH PRESERVED EJECTION FRACTION (HCC): ICD-10-CM

## 2017-11-17 DIAGNOSIS — I13.10 HYPERTENSIVE HEART AND CHRONIC KIDNEY DISEASE: ICD-10-CM

## 2017-11-17 DIAGNOSIS — Z95.5 S/P CORONARY ARTERY STENT PLACEMENT: ICD-10-CM

## 2017-11-17 DIAGNOSIS — I51.89 LEFT VENTRICULAR DIASTOLIC DYSFUNCTION, NYHA CLASS 3: ICD-10-CM

## 2017-11-17 DIAGNOSIS — I10 HTN (HYPERTENSION), MALIGNANT: ICD-10-CM

## 2017-11-17 PROCEDURE — 99214 OFFICE O/P EST MOD 30 MIN: CPT | Performed by: NURSE PRACTITIONER

## 2017-11-17 RX ORDER — METOPROLOL TARTRATE 100 MG/1
50 TABLET ORAL 2 TIMES DAILY
Qty: 60 TAB | Refills: 11 | Status: SHIPPED | OUTPATIENT
Start: 2017-11-17 | End: 2017-12-22 | Stop reason: SDUPTHER

## 2017-11-17 RX ORDER — MIRTAZAPINE 7.5 MG/1
7.5 TABLET, FILM COATED ORAL
Refills: 3 | COMMUNITY
Start: 2017-10-05 | End: 2017-12-22

## 2017-11-17 RX ORDER — CLOPIDOGREL BISULFATE 75 MG/1
75 TABLET ORAL DAILY
Qty: 30 TAB | Refills: 11 | Status: SHIPPED | OUTPATIENT
Start: 2017-11-17 | End: 2018-12-22 | Stop reason: CLARIF

## 2017-11-17 RX ORDER — LISINOPRIL 10 MG/1
10 TABLET ORAL
Refills: 2 | COMMUNITY
Start: 2017-10-18 | End: 2017-11-17

## 2017-11-17 ASSESSMENT — ENCOUNTER SYMPTOMS
CLAUDICATION: 0
ORTHOPNEA: 0
COUGH: 0
PALPITATIONS: 1
MYALGIAS: 0
ABDOMINAL PAIN: 0
SHORTNESS OF BREATH: 0
PND: 0
DIZZINESS: 0
FEVER: 0

## 2017-11-18 NOTE — PATIENT INSTRUCTIONS
Increase Metoprolol to 100 mg Twice a day (can take 2 tabs of the 50 mg dose Twice a day until finished)  Monitor Blood Pressure at home

## 2017-11-18 NOTE — PROGRESS NOTES
Subjective:   Tere Swann is a 66 y.o. female who presents today for follow up on her HTN and Heart failure with preserved ejection fraction.    Patient was initially seen in our heart failure clinic on 11/10/17. During that visit, No changes were made, d/t pt unsure of what dose of metoprolol she was taking and she was taking the medication daily. Pt comes into the clinic after she has taken the metoprolol 50 mg BID over the week. Pt reports no problems. Patient does have end-stage renal disease and is receiving dialysis Monday, Wednesday and Friday.    For her symptoms, patient reports continued fatigue and occ palpitations. Patient denies chest pain, orthopnea, PND, shortness breath or dizziness. Pt reports that her Lower extremity swelling improves when she elevates her legs but is busy and is unable to keep per legs elevated through the day. Pt has not purchased any compression stockings yet.    Additonally, patient has the following medical problems:    -CAD:  RCA with left-to-right collateral; NOEMI ×2 to RCA on 9/7/16    -Left kidney cancer    -End-stage renal disease: Receiving dialysis M/W/F    -Hypertension    -Hyperlipidemia      Past Medical History:   Diagnosis Date   • Abnormal cardiovascular stress test    • Blood clotting disorder (CMS-HCC)     with AVF   • CAD (coronary artery disease)    • Dental disorder     partial dentures- uppers   • Diabetes (CMS-HCC)     oral medication   • Dialysis patient (CMS-HCC)     [facility]   • High cholesterol    • Hyperlipidemia    • Hypertension    • Kidney disease     renal failure , on dialysis, M, W, F.   • Kidney transplant candidate      Past Surgical History:   Procedure Laterality Date   • CARDIAC CATH  9/7/2016    RCA stented with 2 Synergy drug-eluting stents.   • RECOVERY  8/16/2016    Procedure: CATH LAB [facility] WITH POSSIBLE DR. CASTILLO;  Surgeon: [facility];  Location: SURGERY PRE-POST PROC UNIT [facility] [place];  Service:    •  CARDIAC CATH  8/16/16    100% RCA   • OTHER Left 2014    left arm upper extremity fistula   • OTHER ABDOMINAL SURGERY      left kidney removed due to cancer     Family History   Problem Relation Age of Onset   • Diabetes Sister    • Other Sister      liver disease   • Diabetes Brother    • Heart Disease Neg Hx      History   Smoking Status   • Never Smoker   Smokeless Tobacco   • Never Used     No Known Allergies  Outpatient Encounter Prescriptions as of 11/17/2017   Medication Sig Dispense Refill   • Multiple Vitamin (MULTI-VITAMIN DAILY PO) Take  by mouth.     • hydrOXYzine HCl (ATARAX) 25 MG Tab Take 25 mg by mouth every bedtime. prn     • omeprazole (PRILOSEC) 20 MG delayed-release capsule Take 20 mg by mouth every day.     • metoprolol (LOPRESSOR) 50 MG Tab Take 1 Tab by mouth 2 times a day. 60 Tab 11   • lisinopril (PRINIVIL) 20 MG Tab Take 1 Tab by mouth every day. 90 Tab 3   • atorvastatin (LIPITOR) 20 MG Tab Take 20 mg by mouth every evening.     • amlodipine (NORVASC) 10 MG Tab Take 1 Tab by mouth every day. 90 Tab 3   • trazodone (DESYREL) 50 MG Tab Take 1 Tab by mouth every bedtime. 30 Tab 3   • furosemide (LASIX) 40 MG Tab Take 1 Tab by mouth every day. 30 Tab 0   • aspirin (ASA) 81 MG Chew Tab chewable tablet Take 1 Tab by mouth every day. 100 Tab 11   • glipiZIDE (GLUCOTROL) 5 MG Tab Take 1 Tab by mouth every day. 90 Tab 4   • lisinopril (PRINIVIL) 10 MG Tab Take 10 mg by mouth every day.  2   • mirtazapine (REMERON) 7.5 MG tablet Take 7.5 mg by mouth every bedtime.  3   • Sevelamer Carbonate 800 MG Tab Take 2,400 mg by mouth 3 times a day.       No facility-administered encounter medications on file as of 11/17/2017.      Review of Systems   Constitutional: Positive for malaise/fatigue. Negative for fever.        Decreased appetite   Respiratory: Negative for cough and shortness of breath.    Cardiovascular: Positive for palpitations (occ) and leg swelling. Negative for chest pain, orthopnea,  "claudication and PND.   Gastrointestinal: Negative for abdominal pain.   Musculoskeletal: Negative for myalgias.   Neurological: Negative for dizziness.   All other systems reviewed and are negative.       Objective:   /66   Pulse 70   Resp 14   Ht 1.626 m (5' 4\")   Wt 59.4 kg (131 lb)   LMP  (LMP Unknown)   SpO2 92%   BMI 22.49 kg/m²     Physical Exam   Constitutional: She is oriented to person, place, and time. She appears well-developed and well-nourished.   HENT:   Head: Normocephalic and atraumatic.   Eyes: EOM are normal.   Neck: Normal range of motion. Neck supple. No JVD present.   Cardiovascular: Normal rate, regular rhythm, normal heart sounds and intact distal pulses.    No murmur heard.  Pulmonary/Chest: Effort normal and breath sounds normal. No respiratory distress. She has no wheezes. She has no rales.   Abdominal: Soft. Bowel sounds are normal.   Musculoskeletal: Normal range of motion. She exhibits edema (1-2+ Bilateral LE edema).   Neurological: She is alert and oriented to person, place, and time.   Skin: Skin is warm and dry.   Left UE fistula with bruit and thrill   Psychiatric: She has a normal mood and affect.   Nursing note and vitals reviewed.    Lab Results   Component Value Date/Time    CHOLSTRLTOT 135 10/04/2016 08:39 AM    LDL 33 10/04/2016 08:39 AM    HDL 30 (L) 10/04/2016 08:39 AM    TRIGLYCERIDE 362 (H) 10/04/2016 08:39 AM       Lab Results   Component Value Date/Time    SODIUM 139 10/18/2017 04:49 AM    POTASSIUM 4.5 10/18/2017 04:49 AM    CHLORIDE 100 10/18/2017 04:49 AM    CO2 31 10/18/2017 04:49 AM    GLUCOSE 88 10/18/2017 04:49 AM    BUN 12 10/18/2017 04:49 AM    CREATININE 4.87 (H) 10/18/2017 04:49 AM    BUNCREATRAT 5 (L) 10/04/2016 08:39 AM     Lab Results   Component Value Date/Time    ALKPHOSPHAT 71 10/17/2017 04:52 AM    ASTSGOT 25 10/17/2017 04:52 AM    ALTSGPT 24 10/17/2017 04:52 AM    TBILIRUBIN 0.4 10/17/2017 04:52 AM      Myocardial Perfusion Report " 6/16/16   NUCLEAR IMAGING INTERPRETATION   1. Small reversible defect seen only in basal anterolateral wall with    moderate photon reduction. Attenuation defect vs reversible ischemia consider      clinical correlation.   2. Normal left ventricular size, ejection fraction, and wall motion. Normal    left ventricular wall motion, with EF of 82 %. Calculated TID 0.88.   ECG INTERPRETATION   Negative stress ECG for ischemia.       Transthoracic Echo Report 7/13/16  No prior study is available for comparison.   Normal left ventricular chamber size.  Left ventricular ejection fraction is visually estimated to be greater   than 75%.  Grade I diastolic dysfunction.  Mild mitral and tricuspid valve regurgitation.   Normal inferior vena cava size and inspiratory collapse.  Estimated right ventricular systolic pressure  is 25 mmHg.      Transthoracic Echo Report 10/17/17  Moderate concentric left ventricular hypertrophy.  Normal left ventricular systolic function.  Left ventricular ejection fraction is visually estimated to be 60%.  Grade II diastolic dysfunction.  Moderatelydilated left atrium.  Mild mitral regurgitation.  Structurally normal aortic valve without significant stenosis or   regurgitation.  Right ventricular systolic pressure is estimated to be 65 mmHg.  Small pericardial effusion without evidence of hemodynamic compromise.      Myocardial Perfusion Report 10/17/17   NUCLEAR IMAGING INTERPRETATION   Normal myocardial perfusion with no ischemia.   Normal left ventricular wall motion.  LV ejection fraction = 61%.   ECG INTERPRETATION   Negative stress ECG for ischemia.      Coronary Angiogram 9/7/16  POSTOPERATIVE DIAGNOSES:  Status post stenting of the right coronary artery   with 2.5 mm and 2.25 mmSynergy drug-eluting stents with 0% residual stenosis in   stented segment with IRIS-3 flow into moderately diffuse disease, distal right   coronary artery.      Assessment:     1. ACC/AHA stage C heart failure with  preserved ejection fraction (CMS-HCC)  metoprolol (LOPRESSOR) 100 MG Tab   2. Left ventricular diastolic dysfunction, NYHA class 3  metoprolol (LOPRESSOR) 100 MG Tab   3. Coronary artery disease involving native coronary artery of native heart without angina pectoris  clopidogrel (PLAVIX) 75 MG Tab   4. S/P coronary artery stent placement  clopidogrel (PLAVIX) 75 MG Tab   5. HTN (hypertension), malignant  metoprolol (LOPRESSOR) 100 MG Tab   6. Hypertensive heart and chronic kidney disease  metoprolol (LOPRESSOR) 100 MG Tab       Medical Decision Making:  Today's Assessment / Status / Plan:   1. HFpEF, stage C, class 2: pt does have some LE edema but no JVD today or rales.  -Continue furosemide 40 mg daily  -Continue dialysis  -Encouraged patient to purchase and wear compression stockings and to continue to elevate lower extremities (Will have pt try stockings to help with lower extremity edema before considering stopping the amlodipine)  -Reinforced s/sx of worsening heart failure with patient and weight monitoring. Pt verbalizes understanding. Pt to call office or RTC if present.     2. CAD,  RCA with left-to-right collateral; NOEMI ×2 to RCA on 9/7/16:  -Continue Atorvastatin 20 mg Nightly  -Continue aspirin 81 mg daily  -Restart Plavix 75 mg daily ( pt reports someone stopped this but is unsure why and who stopped this. Pt reports no problems while taking the medication)    3. Hypertension:  -Increase metoprolol to 100 mg twice a day  -Continue amlodipine 10 mg daily  -Continue lisinopril 20 mg daily  -Monitor and log Blood pressures at home. Call office or RTC if BP increasing or >180/100, < 90/50 or if symptoms of elevated/low blood pressure present. Reviewed s/sx of stroke and heart attack. Patient to go to ER or call 911 if present.     4. ESRD:   -Continue dialysis, followed by nephrology    FU in clinic in 2 week. Sooner if needed.    Patient verbalizes understanding and agrees with the plan of care.      Collaborating MD: Ulisses Hernandez MD

## 2017-12-11 ENCOUNTER — OFFICE VISIT (OUTPATIENT)
Dept: CARDIOLOGY | Facility: MEDICAL CENTER | Age: 67
End: 2017-12-11
Payer: MEDICARE

## 2017-12-11 VITALS
WEIGHT: 131.8 LBS | OXYGEN SATURATION: 94 % | HEART RATE: 56 BPM | BODY MASS INDEX: 22.5 KG/M2 | SYSTOLIC BLOOD PRESSURE: 150 MMHG | DIASTOLIC BLOOD PRESSURE: 76 MMHG | HEIGHT: 64 IN

## 2017-12-11 DIAGNOSIS — I50.30 ACC/AHA STAGE C HEART FAILURE WITH PRESERVED EJECTION FRACTION (HCC): ICD-10-CM

## 2017-12-11 DIAGNOSIS — I51.89 LEFT VENTRICULAR DIASTOLIC DYSFUNCTION, NYHA CLASS 3: ICD-10-CM

## 2017-12-11 DIAGNOSIS — I10 HTN (HYPERTENSION), MALIGNANT: ICD-10-CM

## 2017-12-11 DIAGNOSIS — I25.10 CORONARY ARTERY DISEASE INVOLVING NATIVE CORONARY ARTERY OF NATIVE HEART WITHOUT ANGINA PECTORIS: ICD-10-CM

## 2017-12-11 DIAGNOSIS — I13.10 HYPERTENSIVE HEART AND CHRONIC KIDNEY DISEASE: ICD-10-CM

## 2017-12-11 DIAGNOSIS — Z95.5 S/P CORONARY ARTERY STENT PLACEMENT: ICD-10-CM

## 2017-12-11 PROCEDURE — 99214 OFFICE O/P EST MOD 30 MIN: CPT | Performed by: NURSE PRACTITIONER

## 2017-12-11 ASSESSMENT — ENCOUNTER SYMPTOMS
ORTHOPNEA: 0
ABDOMINAL PAIN: 0
FEVER: 0
MYALGIAS: 0
CLAUDICATION: 0
DIZZINESS: 0
PND: 0
PALPITATIONS: 1
COUGH: 0
SHORTNESS OF BREATH: 0

## 2017-12-12 NOTE — PROGRESS NOTES
"Subjective:   Tere Swann is a 66 y.o. female who presents today for follow up on her HTN and Heart failure with preserved ejection fraction with her daughter.    Patient was initially seen in our heart failure clinic on 11/3/17. Her last visit was 11/17/17. During that visit, Her metoprolol was increased to 100 mg BID and she was asked to restart her Clopidogrel be \"someone\" told her to stop it but she is unsure who did.      Patient comes in to the office today, she is unsure if she took her medications today, and she did bring in her medications with her but they are missing metoprolol, atorvastatin, clopidogrel. Patient is unsure if she is taking the increased dose of metoprolol or has restarted to clopidogrel. Pt is sure she is taking atorvastatin 20 mg nightly.     Patient does have end-stage renal disease and is receiving dialysis Monday, Wednesday and Friday.    For her symptoms, patient reports continued fatigue and occ palpitations. Patient denies chest pain, orthopnea, PND, shortness breath or dizziness. Pt reports that her Lower extremity swelling improves when she elevates her legs but is busy and is unable to keep per legs elevated through the day. Pt has not purchased any compression stockings yet.    Additonally, patient has the following medical problems:    -CAD:  RCA with left-to-right collateral; NOEMI ×2 to RCA on 9/7/16    -Left kidney cancer    -End-stage renal disease: Receiving dialysis M/W/F    -Hypertension    -Hyperlipidemia      Past Medical History:   Diagnosis Date   • Abnormal cardiovascular stress test    • Blood clotting disorder (CMS-HCC)     with AVF   • CAD (coronary artery disease)    • Dental disorder     partial dentures- uppers   • Diabetes (CMS-HCC)     oral medication   • Dialysis patient (CMS-HCC)     [facility]   • High cholesterol    • Hyperlipidemia    • Hypertension    • Kidney disease     renal failure , on dialysis, M, W, F.   • Kidney transplant " candidate      Past Surgical History:   Procedure Laterality Date   • CARDIAC CATH  9/7/2016    RCA stented with 2 Synergy drug-eluting stents.   • RECOVERY  8/16/2016    Procedure: CATH LAB [facility] WITH POSSIBLE DR. CASTILLO;  Surgeon: [facility];  [place] [facility] [place]  Service:    • CARDIAC CATH  8/16/16    100% RCA   • OTHER Left 2014    left arm upper extremity fistula   • OTHER ABDOMINAL SURGERY      left kidney removed due to cancer     Family History   Problem Relation Age of Onset   • Diabetes Sister    • Other Sister      liver disease   • Diabetes Brother    • Heart Disease Neg Hx      History   Smoking Status   • Never Smoker   Smokeless Tobacco   • Never Used     No Known Allergies  Outpatient Encounter Prescriptions as of 12/11/2017   Medication Sig Dispense Refill   • furosemide (LASIX) 40 MG Tab TAKE 1 TABLET BY MOUTH DAILY 90 Tab 3   • Multiple Vitamin (MULTI-VITAMIN DAILY PO) Take  by mouth.     • hydrOXYzine HCl (ATARAX) 25 MG Tab Take 25 mg by mouth every bedtime. prn     • omeprazole (PRILOSEC) 20 MG delayed-release capsule Take 20 mg by mouth every day.     • lisinopril (PRINIVIL) 20 MG Tab Take 1 Tab by mouth every day. 90 Tab 3   • atorvastatin (LIPITOR) 20 MG Tab Take 20 mg by mouth every evening.     • amlodipine (NORVASC) 10 MG Tab Take 1 Tab by mouth every day. 90 Tab 3   • trazodone (DESYREL) 50 MG Tab Take 1 Tab by mouth every bedtime. 30 Tab 3   • Sevelamer Carbonate 800 MG Tab Take 2,400 mg by mouth 3 times a day.     • glipiZIDE (GLUCOTROL) 5 MG Tab Take 1 Tab by mouth every day. 90 Tab 4   • mirtazapine (REMERON) 7.5 MG tablet Take 7.5 mg by mouth every bedtime.  3   • metoprolol (LOPRESSOR) 100 MG Tab Take 0.5 Tabs by mouth 2 times a day. 60 Tab 11   • clopidogrel (PLAVIX) 75 MG Tab Take 1 Tab by mouth every day. 30 Tab 11   • aspirin (ASA) 81 MG Chew Tab chewable tablet Take 1 Tab by mouth every day. 100 Tab 11     No facility-administered encounter  "medications on file as of 12/11/2017.      Review of Systems   Constitutional: Positive for malaise/fatigue. Negative for fever.        Decreased appetite   Respiratory: Negative for cough and shortness of breath.    Cardiovascular: Positive for palpitations (occ) and leg swelling. Negative for chest pain, orthopnea, claudication and PND.   Gastrointestinal: Negative for abdominal pain.   Musculoskeletal: Negative for myalgias.   Neurological: Negative for dizziness.   All other systems reviewed and are negative.       Objective:   /76   Pulse (!) 56   Ht 1.626 m (5' 4\")   Wt 59.8 kg (131 lb 12.8 oz)   LMP  (LMP Unknown)   SpO2 94%   BMI 22.62 kg/m²     Physical Exam   Constitutional: She is oriented to person, place, and time. She appears well-developed and well-nourished.   HENT:   Head: Normocephalic and atraumatic.   Eyes: EOM are normal.   Neck: Normal range of motion. Neck supple. No JVD present.   Cardiovascular: Normal rate, regular rhythm, normal heart sounds and intact distal pulses.    No murmur heard.  Pulmonary/Chest: Effort normal and breath sounds normal. No respiratory distress. She has no wheezes. She has no rales.   Abdominal: Soft. Bowel sounds are normal.   Musculoskeletal: Normal range of motion. She exhibits edema (1-2+ Bilateral LE edema).   Neurological: She is alert and oriented to person, place, and time.   Skin: Skin is warm and dry.   Left UE fistula with bruit and thrill   Psychiatric: She has a normal mood and affect.   Nursing note and vitals reviewed.    Lab Results   Component Value Date/Time    CHOLSTRLTOT 135 10/04/2016 08:39 AM    LDL 33 10/04/2016 08:39 AM    HDL 30 (L) 10/04/2016 08:39 AM    TRIGLYCERIDE 362 (H) 10/04/2016 08:39 AM       Lab Results   Component Value Date/Time    SODIUM 139 10/18/2017 04:49 AM    POTASSIUM 4.5 10/18/2017 04:49 AM    CHLORIDE 100 10/18/2017 04:49 AM    CO2 31 10/18/2017 04:49 AM    GLUCOSE 88 10/18/2017 04:49 AM    BUN 12 10/18/2017 " 04:49 AM    CREATININE 4.87 (H) 10/18/2017 04:49 AM    BUNCREATRAT 5 (L) 10/04/2016 08:39 AM     Lab Results   Component Value Date/Time    ALKPHOSPHAT 71 10/17/2017 04:52 AM    ASTSGOT 25 10/17/2017 04:52 AM    ALTSGPT 24 10/17/2017 04:52 AM    TBILIRUBIN 0.4 10/17/2017 04:52 AM      Myocardial Perfusion Report 6/16/16   NUCLEAR IMAGING INTERPRETATION   1. Small reversible defect seen only in basal anterolateral wall with    moderate photon reduction. Attenuation defect vs reversible ischemia consider      clinical correlation.   2. Normal left ventricular size, ejection fraction, and wall motion. Normal    left ventricular wall motion, with EF of 82 %. Calculated TID 0.88.   ECG INTERPRETATION   Negative stress ECG for ischemia.       Transthoracic Echo Report 7/13/16  No prior study is available for comparison.   Normal left ventricular chamber size.  Left ventricular ejection fraction is visually estimated to be greater   than 75%.  Grade I diastolic dysfunction.  Mild mitral and tricuspid valve regurgitation.   Normal inferior vena cava size and inspiratory collapse.  Estimated right ventricular systolic pressure  is 25 mmHg.      Transthoracic Echo Report 10/17/17  Moderate concentric left ventricular hypertrophy.  Normal left ventricular systolic function.  Left ventricular ejection fraction is visually estimated to be 60%.  Grade II diastolic dysfunction.  Moderatelydilated left atrium.  Mild mitral regurgitation.  Structurally normal aortic valve without significant stenosis or   regurgitation.  Right ventricular systolic pressure is estimated to be 65 mmHg.  Small pericardial effusion without evidence of hemodynamic compromise.      Myocardial Perfusion Report 10/17/17   NUCLEAR IMAGING INTERPRETATION   Normal myocardial perfusion with no ischemia.   Normal left ventricular wall motion.  LV ejection fraction = 61%.   ECG INTERPRETATION   Negative stress ECG for ischemia.      Coronary Angiogram  9/7/16  POSTOPERATIVE DIAGNOSES:  Status post stenting of the right coronary artery   with 2.5 mm and 2.25 mmSynergy drug-eluting stents with 0% residual stenosis in   stented segment with IRIS-3 flow into moderately diffuse disease, distal right   coronary artery.      Assessment:     1. ACC/AHA stage C heart failure with preserved ejection fraction (CMS-HCC)     2. Left ventricular diastolic dysfunction, NYHA class 3     3. HTN (hypertension), malignant     4. Hypertensive heart and chronic kidney disease         Medical Decision Making:  Today's Assessment / Status / Plan:   Printed Medication list provided to patient to verify with home medications and call office with updated medication list before adjusting or increasing any medication over the week. BP is slightly better.     1. HFpEF, stage C, class 2: pt does have some LE edema but no JVD today or rales.  -Continue furosemide 40 mg daily  -Continue dialysis  -Encouraged patient to purchase and wear compression stockings and to continue to elevate lower extremities (Will have pt try stockings to help with lower extremity edema before considering stopping the amlodipine) Stress importance of this with patient and her daughter.   -Reinforced s/sx of worsening heart failure with patient and weight monitoring. Pt verbalizes understanding. Pt to call office or RTC if present.     2. CAD,  RCA with left-to-right collateral; NOEMI ×2 to RCA on 9/7/16:  -Continue Atorvastatin 20 mg Nightly  -Continue aspirin 81 mg daily  -Restart Plavix 75 mg daily ( pt reports someone stopped this but is unsure why and who stopped this. Pt reports no problems while taking the medication)    3. Hypertension: slightly elevated today  -Continue metoprolol 100 mg twice a day (if not already taking this dose then continue on 50 mg BID) Will monitor HR  -Continue amlodipine 10 mg daily  -Continue lisinopril 20 mg daily  -Monitor and log Blood pressures at home. Call office or RTC if BP  increasing or >180/100, < 90/50 or if symptoms of elevated/low blood pressure present. Reviewed s/sx of stroke and heart attack. Patient to go to ER or call 911 if present.     4. ESRD:   -Continue dialysis, followed by nephrology    FU in clinic in 1 week. Sooner if needed.    Patient verbalizes understanding and agrees with the plan of care.     Collaborating MD: Ritesh Marie MD

## 2017-12-22 ENCOUNTER — OFFICE VISIT (OUTPATIENT)
Dept: CARDIOLOGY | Facility: MEDICAL CENTER | Age: 67
End: 2017-12-22
Payer: MEDICARE

## 2017-12-22 VITALS
WEIGHT: 129 LBS | BODY MASS INDEX: 22.02 KG/M2 | HEIGHT: 64 IN | HEART RATE: 64 BPM | SYSTOLIC BLOOD PRESSURE: 146 MMHG | DIASTOLIC BLOOD PRESSURE: 62 MMHG | OXYGEN SATURATION: 96 %

## 2017-12-22 DIAGNOSIS — Z95.5 S/P CORONARY ARTERY STENT PLACEMENT: ICD-10-CM

## 2017-12-22 DIAGNOSIS — I51.89 LEFT VENTRICULAR DIASTOLIC DYSFUNCTION, NYHA CLASS 3: ICD-10-CM

## 2017-12-22 DIAGNOSIS — I13.10 HYPERTENSIVE HEART AND CHRONIC KIDNEY DISEASE: ICD-10-CM

## 2017-12-22 DIAGNOSIS — I10 HTN (HYPERTENSION), MALIGNANT: ICD-10-CM

## 2017-12-22 DIAGNOSIS — N18.6 ESRD (END STAGE RENAL DISEASE) (HCC): ICD-10-CM

## 2017-12-22 DIAGNOSIS — I50.30 ACC/AHA STAGE C HEART FAILURE WITH PRESERVED EJECTION FRACTION (HCC): ICD-10-CM

## 2017-12-22 DIAGNOSIS — I25.10 CORONARY ARTERY DISEASE INVOLVING NATIVE CORONARY ARTERY OF NATIVE HEART WITHOUT ANGINA PECTORIS: ICD-10-CM

## 2017-12-22 PROCEDURE — 99214 OFFICE O/P EST MOD 30 MIN: CPT | Performed by: NURSE PRACTITIONER

## 2017-12-22 RX ORDER — ROPINIROLE 0.25 MG/1
0.25 TABLET, FILM COATED ORAL 3 TIMES DAILY
COMMUNITY
End: 2018-02-01 | Stop reason: SDUPTHER

## 2017-12-22 RX ORDER — METOPROLOL TARTRATE 100 MG/1
100 TABLET ORAL 2 TIMES DAILY
Qty: 60 TAB | Refills: 11 | Status: SHIPPED | OUTPATIENT
Start: 2017-12-22 | End: 2018-12-22 | Stop reason: CLARIF

## 2017-12-22 RX ORDER — METOPROLOL TARTRATE 50 MG/1
50 TABLET, FILM COATED ORAL 2 TIMES DAILY
Refills: 11 | COMMUNITY
Start: 2017-11-03 | End: 2017-12-22

## 2017-12-22 ASSESSMENT — ENCOUNTER SYMPTOMS
COUGH: 0
CLAUDICATION: 0
ORTHOPNEA: 0
PALPITATIONS: 1
FEVER: 0
MYALGIAS: 0
DIZZINESS: 0
SHORTNESS OF BREATH: 0
ABDOMINAL PAIN: 0
PND: 0

## 2017-12-23 NOTE — PROGRESS NOTES
Subjective:   Tere Swann is a 66 y.o. female who presents today for follow up on her HTN and Heart failure with preserved ejection fraction with her daughter.    Patient was initially seen in our heart failure clinic on 11/3/17. Her last visit was 12/11/17. During that visit, pt was not sure if she was taking metoprolol at 100 mg BID. Pt comes into the clinic today with all her pills and reports that she is taking her metoprolol at 100 mg BID and did restart her Clopidogrel.     Patient does have end-stage renal disease and is receiving dialysis Monday, Wednesday and Friday.    For her symptoms, patient reports continued fatigue and occ palpitations. Patient denies chest pain, orthopnea, PND, shortness breath or dizziness. Pt reports that her Lower extremity swelling has improved. Pt has not purchased any compression stockings yet. Her daughter is working on purchasing them once she returns from [place].     Pt does not weigh herself daily. Pt gets weights done at dialysis.    Additonally, patient has the following medical problems:    -CAD:  RCA with left-to-right collateral; NOEMI ×2 to RCA on 9/7/16    -Left kidney cancer    -End-stage renal disease: Receiving dialysis M/W/F    -Hypertension    -Hyperlipidemia      Past Medical History:   Diagnosis Date   • Abnormal cardiovascular stress test    • Blood clotting disorder (CMS-HCC)     with AVF   • CAD (coronary artery disease)    • Dental disorder     partial dentures- uppers   • Diabetes (CMS-HCC)     oral medication   • Dialysis patient (CMS-HCC)     [facility]   • High cholesterol    • Hyperlipidemia    • Hypertension    • Kidney disease     renal failure , on dialysis, M, W, F.   • Kidney transplant candidate      Past Surgical History:   Procedure Laterality Date   • CARDIAC CATH  9/7/2016    RCA stented with 2 Synergy drug-eluting stents.   • RECOVERY  8/16/2016    Procedure: CATH LAB [facility] WITH POSSIBLE DR. CASTILLO;  Surgeon:  Recoveryonly Surgery;  Location: SURGERY PRE-POST PROC UNIT [facility];  Service:    • CARDIAC CATH  8/16/16    100% RCA   • OTHER Left 2014    left arm upper extremity fistula   • OTHER ABDOMINAL SURGERY      left kidney removed due to cancer     Family History   Problem Relation Age of Onset   • Diabetes Sister    • Other Sister      liver disease   • Diabetes Brother    • Heart Disease Neg Hx      History   Smoking Status   • Never Smoker   Smokeless Tobacco   • Never Used     No Known Allergies  Outpatient Encounter Prescriptions as of 12/22/2017   Medication Sig Dispense Refill   • ropinirole (REQUIP) 0.25 MG Tab Take 0.25 mg by mouth 3 times a day.     • furosemide (LASIX) 40 MG Tab TAKE 1 TABLET BY MOUTH DAILY 90 Tab 3   • metoprolol (LOPRESSOR) 100 MG Tab Take 0.5 Tabs by mouth 2 times a day. 60 Tab 11   • clopidogrel (PLAVIX) 75 MG Tab Take 1 Tab by mouth every day. 30 Tab 11   • hydrOXYzine HCl (ATARAX) 25 MG Tab Take 25 mg by mouth every bedtime. prn     • omeprazole (PRILOSEC) 20 MG delayed-release capsule Take 20 mg by mouth every day.     • lisinopril (PRINIVIL) 20 MG Tab Take 1 Tab by mouth every day. 90 Tab 3   • atorvastatin (LIPITOR) 20 MG Tab Take 20 mg by mouth every evening.     • amlodipine (NORVASC) 10 MG Tab Take 1 Tab by mouth every day. 90 Tab 3   • trazodone (DESYREL) 50 MG Tab Take 1 Tab by mouth every bedtime. 30 Tab 3   • Sevelamer Carbonate 800 MG Tab Take 2,400 mg by mouth 3 times a day.     • glipiZIDE (GLUCOTROL) 5 MG Tab Take 1 Tab by mouth every day. 90 Tab 4   • [DISCONTINUED] metoprolol (LOPRESSOR) 50 MG Tab Take 50 mg by mouth 2 times a day. TAKE 1 TAB BY MOUTH 2 TIMES A DAY.  11   • mirtazapine (REMERON) 7.5 MG tablet Take 7.5 mg by mouth every bedtime.  3   • Multiple Vitamin (MULTI-VITAMIN DAILY PO) Take  by mouth.     • aspirin (ASA) 81 MG Chew Tab chewable tablet Take 1 Tab by mouth every day. 100 Tab 11     No facility-administered encounter medications on file as of 12/22/2017.  "     Review of Systems   Constitutional: Positive for malaise/fatigue. Negative for fever.   Respiratory: Negative for cough and shortness of breath.    Cardiovascular: Positive for palpitations (occ) and leg swelling. Negative for chest pain, orthopnea, claudication and PND.   Gastrointestinal: Negative for abdominal pain.   Musculoskeletal: Negative for myalgias.   Neurological: Negative for dizziness.   All other systems reviewed and are negative.       Objective:   /62   Pulse 64   Ht 1.626 m (5' 4\")   Wt 58.5 kg (129 lb)   LMP  (LMP Unknown)   SpO2 96%   BMI 22.14 kg/m²     Physical Exam   Constitutional: She is oriented to person, place, and time. She appears well-developed and well-nourished.   HENT:   Head: Normocephalic and atraumatic.   Eyes: EOM are normal.   Neck: Normal range of motion. Neck supple. No JVD present.   Cardiovascular: Normal rate, regular rhythm, normal heart sounds and intact distal pulses.    No murmur heard.  Pulmonary/Chest: Effort normal and breath sounds normal. No respiratory distress. She has no wheezes. She has no rales.   Abdominal: Soft. Bowel sounds are normal.   Musculoskeletal: Normal range of motion. She exhibits edema (trace Bilateral LE edema).   Neurological: She is alert and oriented to person, place, and time.   Skin: Skin is warm and dry.   Left UE fistula with bruit and thrill   Psychiatric: She has a normal mood and affect.   Nursing note and vitals reviewed.    Lab Results   Component Value Date/Time    CHOLSTRLTOT 135 10/04/2016 08:39 AM    LDL 33 10/04/2016 08:39 AM    HDL 30 (L) 10/04/2016 08:39 AM    TRIGLYCERIDE 362 (H) 10/04/2016 08:39 AM       Lab Results   Component Value Date/Time    SODIUM 139 10/18/2017 04:49 AM    POTASSIUM 4.5 10/18/2017 04:49 AM    CHLORIDE 100 10/18/2017 04:49 AM    CO2 31 10/18/2017 04:49 AM    GLUCOSE 88 10/18/2017 04:49 AM    BUN 12 10/18/2017 04:49 AM    CREATININE 4.87 (H) 10/18/2017 04:49 AM    BUNCREATRAT 5 (L) " 10/04/2016 08:39 AM     Lab Results   Component Value Date/Time    ALKPHOSPHAT 71 10/17/2017 04:52 AM    ASTSGOT 25 10/17/2017 04:52 AM    ALTSGPT 24 10/17/2017 04:52 AM    TBILIRUBIN 0.4 10/17/2017 04:52 AM      Myocardial Perfusion Report 6/16/16   NUCLEAR IMAGING INTERPRETATION   1. Small reversible defect seen only in basal anterolateral wall with    moderate photon reduction. Attenuation defect vs reversible ischemia consider      clinical correlation.   2. Normal left ventricular size, ejection fraction, and wall motion. Normal    left ventricular wall motion, with EF of 82 %. Calculated TID 0.88.   ECG INTERPRETATION   Negative stress ECG for ischemia.       Transthoracic Echo Report 7/13/16  No prior study is available for comparison.   Normal left ventricular chamber size.  Left ventricular ejection fraction is visually estimated to be greater   than 75%.  Grade I diastolic dysfunction.  Mild mitral and tricuspid valve regurgitation.   Normal inferior vena cava size and inspiratory collapse.  Estimated right ventricular systolic pressure  is 25 mmHg.      Transthoracic Echo Report 10/17/17  Moderate concentric left ventricular hypertrophy.  Normal left ventricular systolic function.  Left ventricular ejection fraction is visually estimated to be 60%.  Grade II diastolic dysfunction.  Moderatelydilated left atrium.  Mild mitral regurgitation.  Structurally normal aortic valve without significant stenosis or   regurgitation.  Right ventricular systolic pressure is estimated to be 65 mmHg.  Small pericardial effusion without evidence of hemodynamic compromise.      Myocardial Perfusion Report 10/17/17   NUCLEAR IMAGING INTERPRETATION   Normal myocardial perfusion with no ischemia.   Normal left ventricular wall motion.  LV ejection fraction = 61%.   ECG INTERPRETATION   Negative stress ECG for ischemia.      Coronary Angiogram 9/7/16  POSTOPERATIVE DIAGNOSES:  Status post stenting of the right coronary artery    with 2.5 mm and 2.25 mmSynergy drug-eluting stents with 0% residual stenosis in   stented segment with IRIS-3 flow into moderately diffuse disease, distal right   coronary artery.      Assessment:     1. ACC/AHA stage C heart failure with preserved ejection fraction (CMS-[facility])  metoprolol (LOPRESSOR) 100 MG Tab   2. Left ventricular diastolic dysfunction, NYHA class 3  metoprolol (LOPRESSOR) 100 MG Tab   3. Coronary artery disease involving native coronary artery of native heart without angina pectoris     4. S/P coronary artery stent placement     5. HTN (hypertension), malignant  metoprolol (LOPRESSOR) 100 MG Tab   6. Hypertensive heart and chronic kidney disease  metoprolol (LOPRESSOR) 100 MG Tab   7. ESRD (end stage renal disease) (CMS-HCC)         Medical Decision Making:  Today's Assessment / Status / Plan:   1. HFpEF, stage C, class 2: Pt does have some LE edema but no JVD today or rales.  -Continue furosemide 40 mg daily  -Continue dialysis  -Encouraged patient to purchase and wear compression stockings and to continue to elevate lower extremities (Will have pt try stockings to help with lower extremity edema before considering stopping the amlodipine) Stress importance of this with patient and her daughter.   -Reinforced s/sx of worsening heart failure with patient and weight monitoring. Pt verbalizes understanding. Pt to call office or RTC if present.     2. CAD,  RCA with left-to-right collateral; NOEMI ×2 to RCA on 9/7/16:  -Continue Atorvastatin 20 mg Nightly  -Continue aspirin 81 mg daily  -Continue Plavix 75 mg daily     3. Hypertension: stable  -Continue metoprolol 100 mg twice a day   -Continue amlodipine 10 mg daily  -Continue lisinopril 20 mg daily  -Monitor and log Blood pressures at home. Call office or RTC if BP increasing or >180/100, < 90/50 or if symptoms of elevated/low blood pressure present. Reviewed s/sx of stroke and heart attack. Patient to go to ER or call 911 if present.     4.  ESRD:   -Continue dialysis, followed by nephrology    FU in clinic in 2-3 months with Dr. Hernandez. Sooner if needed.    Patient verbalizes understanding and agrees with the plan of care.     Collaborating MD: Ulisses Hernandez MD

## 2018-01-22 ENCOUNTER — OFFICE VISIT (OUTPATIENT)
Dept: MEDICAL GROUP | Facility: PHYSICIAN GROUP | Age: 68
End: 2018-01-22
Payer: MEDICARE

## 2018-01-22 VITALS
OXYGEN SATURATION: 94 % | HEART RATE: 78 BPM | HEIGHT: 64 IN | TEMPERATURE: 98.3 F | WEIGHT: 129 LBS | SYSTOLIC BLOOD PRESSURE: 168 MMHG | DIASTOLIC BLOOD PRESSURE: 52 MMHG | BODY MASS INDEX: 22.02 KG/M2 | RESPIRATION RATE: 16 BRPM

## 2018-01-22 DIAGNOSIS — E11.8 TYPE 2 DIABETES MELLITUS WITH COMPLICATION, UNSPECIFIED LONG TERM INSULIN USE STATUS: ICD-10-CM

## 2018-01-22 DIAGNOSIS — N18.6 ESRF (END STAGE RENAL FAILURE) (HCC): ICD-10-CM

## 2018-01-22 DIAGNOSIS — I50.33 ACUTE ON CHRONIC DIASTOLIC CHF (CONGESTIVE HEART FAILURE) (HCC): ICD-10-CM

## 2018-01-22 DIAGNOSIS — I10 HYPERTENSION, UNSPECIFIED TYPE: ICD-10-CM

## 2018-01-22 DIAGNOSIS — N18.6 ESRD (END STAGE RENAL DISEASE) (HCC): ICD-10-CM

## 2018-01-22 DIAGNOSIS — R63.0 NO APPETITE: ICD-10-CM

## 2018-01-22 DIAGNOSIS — R53.83 FATIGUE, UNSPECIFIED TYPE: ICD-10-CM

## 2018-01-22 PROCEDURE — 99214 OFFICE O/P EST MOD 30 MIN: CPT | Performed by: NURSE PRACTITIONER

## 2018-01-23 NOTE — PROGRESS NOTES
Chief Complaint   Patient presents with   • Extremity Weakness     mainly on the left side x3 months     • Loss of Appetite       HISTORY OF PRESENT ILLNESS: Patient is a 67 y.o. female established patient who presents today to discuss the following issues:    No appetite  Patient is here with her son today, and he reports that his mom has no appetite.  He states that she is tired all the time, and doesn't eat anything.  He states that they are not getting answers from her specialists, and he would like to know what is going on.  Discussed plan to proceed with labs, as well as the fact that she needs follow-up with her nephrologist.  She goes to dialysis every Monday, Wednesday, and Friday.    ESRD (end stage renal disease) (CMS-HCC)  Her last lab work through [facility] was in October, (although her son states that they check blood work every time she has dialysis), and her GFR was 9... We discussed that part, if not all, of the reason that she has no appetite and is tired is that she has ESRD.    Acute on chronic diastolic CHF (congestive heart failure) (CMS-HCC)  Is followed closely by cardiology.      Patient Active Problem List    Diagnosis Date Noted   • No appetite 01/22/2018   • Acute hypoxemic respiratory failure (CMS-HCC) 10/17/2017   • ESRD (end stage renal disease) (CMS-HCC) 10/16/2017   • Acute on chronic diastolic CHF (congestive heart failure) (CMS-HCC) 10/16/2017   • Chronic total occlusion of native coronary artery 09/07/2016   • S/P coronary artery stent placement 09/07/2016   • Renal hypertension 08/05/2016   • End stage renal failure on dialysis (CMS-HCC) 08/05/2016   • Type 2 diabetes mellitus (CMS-HCC) 08/05/2016   • Kidney transplant candidate    • Abnormal cardiovascular stress test    • CKD (chronic kidney disease) stage 5, GFR less than 15 ml/min (CMS-HCC) 01/14/2014   • Essential hypertension 09/17/2013       Allergies:Patient has no known allergies.    Current Outpatient Prescriptions    Medication Sig Dispense Refill   • ropinirole (REQUIP) 0.25 MG Tab Take 0.25 mg by mouth 3 times a day.     • metoprolol (LOPRESSOR) 100 MG Tab Take 1 Tab by mouth 2 times a day. 60 Tab 11   • furosemide (LASIX) 40 MG Tab TAKE 1 TABLET BY MOUTH DAILY 90 Tab 3   • clopidogrel (PLAVIX) 75 MG Tab Take 1 Tab by mouth every day. 30 Tab 11   • Multiple Vitamin (MULTI-VITAMIN DAILY PO) Take  by mouth.     • hydrOXYzine HCl (ATARAX) 25 MG Tab Take 25 mg by mouth every bedtime. prn     • omeprazole (PRILOSEC) 20 MG delayed-release capsule Take 20 mg by mouth every day.     • lisinopril (PRINIVIL) 20 MG Tab Take 1 Tab by mouth every day. 90 Tab 3   • atorvastatin (LIPITOR) 20 MG Tab Take 20 mg by mouth every evening.     • amlodipine (NORVASC) 10 MG Tab Take 1 Tab by mouth every day. 90 Tab 3   • trazodone (DESYREL) 50 MG Tab Take 1 Tab by mouth every bedtime. 30 Tab 3   • Sevelamer Carbonate 800 MG Tab Take 2,400 mg by mouth 3 times a day.     • aspirin (ASA) 81 MG Chew Tab chewable tablet Take 1 Tab by mouth every day. 100 Tab 11   • glipiZIDE (GLUCOTROL) 5 MG Tab Take 1 Tab by mouth every day. 90 Tab 4     No current facility-administered medications for this visit.        Social History   Substance Use Topics   • Smoking status: Never Smoker   • Smokeless tobacco: Never Used   • Alcohol use No       Family Status   Relation Status   • Mother  at age 20+    ? CHF, had ascities   • Father  at age 93    old age   • Sister  at age 68    diabetes   • Brother Alive   • Neg Hx      Family History   Problem Relation Age of Onset   • Diabetes Sister    • Other Sister      liver disease   • Diabetes Brother    • Heart Disease Neg Hx        Review of Systems:   Constitutional: Negative for fever, chills, weight loss and malaise.  Positive for fatigue and no appetite.   HENT: Negative for ear pain, nosebleeds, congestion, sore throat and neck pain.    Eyes: Negative for blurred vision.   Respiratory:  "Negative for cough, sputum production, shortness of breath and wheezing.    Cardiovascular: Negative for chest pain, palpitations, orthopnea and leg swelling.   Gastrointestinal: Negative for heartburn, nausea, vomiting and abdominal pain.   Genitourinary: Negative for dysuria, urgency and frequency.   Musculoskeletal: Negative for myalgias, joint pain, and back pain.  Skin: Negative for rash and itching.   Neurological: Negative for dizziness, tingling, tremors, sensory change, focal weakness and headaches.   Endo/Heme/Allergies: Does not bruise/bleed easily.   Psychiatric/Behavioral: Negative for depression, suicidal ideas and memory loss.  The patient is not nervous/anxious and does not have insomnia.    All other systems reviewed and are negative except as in HPI.    Exam:  Blood pressure (!) 168/52, pulse 78, temperature 36.8 °C (98.3 °F), resp. rate 16, height 1.626 m (5' 4\"), weight 58.5 kg (129 lb), SpO2 94 %.  General:  Well nourished, well developed female in NAD  Head: Grossly normal.  Neck: Supple without JVD or bruit. Thyroid is not enlarged.  Pulmonary: Clear to ausculation. Normal effort. No rales, ronchi, or wheezing.  Cardiovascular: Regular rate and rhythm without murmur.   Abdomen:  Soft, nontender, nondistended.  Extremities: No clubbing, cyanosis, or edema.  Skin: Intact with no obvious rashes or lesions.  Neuro: Grossly intact.  Psych: Alert and oriented x 3.  Mood and affect appropriate.    Medical decision-making and discussion: Tere is here today for follow-up.  Lab work was ordered, and they were instructed to follow-up with Dr. Burnett.  Her son will continue to encourage her to eat, and she will keep all appointments for dialysis.  They will follow-up here as needed.          Assessment/Plan:  1. No appetite  CBC WITH DIFFERENTIAL    COMP METABOLIC PANEL    HEMOGLOBIN A1C    TSH    VITAMIN D,25 HYDROXY    VITAMIN B12   2. Fatigue, unspecified type  CBC WITH DIFFERENTIAL    COMP " METABOLIC PANEL    HEMOGLOBIN A1C    TSH    VITAMIN D,25 HYDROXY    VITAMIN B12   3. ESRF (end stage renal failure) (CMS-HCC)  CBC WITH DIFFERENTIAL    COMP METABOLIC PANEL    HEMOGLOBIN A1C    TSH    VITAMIN D,25 HYDROXY    VITAMIN B12   4. Type 2 diabetes mellitus with complication, unspecified long term insulin use status (CMS-HCC)  CBC WITH DIFFERENTIAL    COMP METABOLIC PANEL    HEMOGLOBIN A1C   5. Hypertension, unspecified type  CBC WITH DIFFERENTIAL    COMP METABOLIC PANEL   6. ESRD (end stage renal disease) (CMS-HCC)     7. Acute on chronic diastolic CHF (congestive heart failure) (CMS-HCC)         Return if symptoms worsen or fail to improve.    Please note that this dictation was created using voice recognition software. I have made every reasonable attempt to correct obvious errors, but I expect that there are errors of grammar and possibly content that I did not discover before finalizing the note.

## 2018-01-23 NOTE — ASSESSMENT & PLAN NOTE
Patient is here with her son today, and he reports that his mom has no appetite.  He states that she is tired all the time, and doesn't eat anything.  He states that they are not getting answers from her specialists, and he would like to know what is going on.  Discussed plan to proceed with labs, as well as the fact that she needs follow-up with her nephrologist.  She goes to dialysis every Monday, Wednesday, and Friday.

## 2018-01-23 NOTE — ASSESSMENT & PLAN NOTE
Her last lab work through [facility] was in October, (although her son states that they check blood work every time she has dialysis), and her GFR was 9... We discussed that part, if not all, of the reason that she has no appetite and is tired is that she has ESRD.

## 2018-01-25 LAB
25(OH)D3+25(OH)D2 SERPL-MCNC: 41.2 NG/ML (ref 30–100)
ALBUMIN SERPL-MCNC: 4.3 G/DL (ref 3.6–4.8)
ALBUMIN/GLOB SERPL: 1.7 {RATIO} (ref 1.2–2.2)
ALP SERPL-CCNC: 122 IU/L (ref 39–117)
ALT SERPL-CCNC: 46 IU/L (ref 0–32)
AST SERPL-CCNC: 28 IU/L (ref 0–40)
BASOPHILS # BLD AUTO: ABNORMAL 10*3/UL
BASOPHILS NFR BLD AUTO: ABNORMAL %
BILIRUB SERPL-MCNC: 0.4 MG/DL (ref 0–1.2)
BUN SERPL-MCNC: 29 MG/DL (ref 8–27)
BUN/CREAT SERPL: 5 (ref 12–28)
CALCIUM SERPL-MCNC: 11.1 MG/DL (ref 8.7–10.3)
CHLORIDE SERPL-SCNC: 97 MMOL/L (ref 96–106)
CO2 SERPL-SCNC: 26 MMOL/L (ref 18–29)
CREAT SERPL-MCNC: 5.75 MG/DL (ref 0.57–1)
EOSINOPHIL # BLD AUTO: ABNORMAL 10*3/UL
EOSINOPHIL NFR BLD AUTO: ABNORMAL %
ERYTHROCYTE [DISTWIDTH] IN BLOOD BY AUTOMATED COUNT: 15.6 % (ref 12.3–15.4)
GFR SERPLBLD CREATININE-BSD FMLA CKD-EPI: 7 ML/MIN/1.73
GFR SERPLBLD CREATININE-BSD FMLA CKD-EPI: 8 ML/MIN/1.73
GLOBULIN SER CALC-MCNC: 2.6 G/DL (ref 1.5–4.5)
GLUCOSE SERPL-MCNC: 112 MG/DL (ref 65–99)
HBA1C MFR BLD: 6 % (ref 4.8–5.6)
HCT VFR BLD AUTO: 33.4 % (ref 34–46.6)
HGB BLD-MCNC: 11.1 G/DL (ref 11.1–15.9)
IMM GRANULOCYTES # BLD: ABNORMAL 10*3/UL
IMM GRANULOCYTES NFR BLD: ABNORMAL %
IMMATURE CELLS  115398: ABNORMAL
LYMPHOCYTES # BLD AUTO: ABNORMAL 10*3/UL
LYMPHOCYTES NFR BLD AUTO: ABNORMAL %
MCH RBC QN AUTO: 28.1 PG (ref 26.6–33)
MCHC RBC AUTO-ENTMCNC: 33.2 G/DL (ref 31.5–35.7)
MCV RBC AUTO: 85 FL (ref 79–97)
MONOCYTES # BLD AUTO: ABNORMAL 10*3/UL
MONOCYTES NFR BLD AUTO: ABNORMAL %
MORPHOLOGY BLD-IMP: ABNORMAL
NEUTROPHILS # BLD AUTO: ABNORMAL 10*3/UL
NEUTROPHILS NFR BLD AUTO: ABNORMAL %
NRBC BLD AUTO-RTO: ABNORMAL %
PLATELET # BLD AUTO: 118 X10E3/UL (ref 150–379)
POTASSIUM SERPL-SCNC: 4.8 MMOL/L (ref 3.5–5.2)
PROT SERPL-MCNC: 6.9 G/DL (ref 6–8.5)
RBC # BLD AUTO: 3.95 X10E6/UL (ref 3.77–5.28)
SODIUM SERPL-SCNC: 142 MMOL/L (ref 134–144)
TSH SERPL DL<=0.005 MIU/L-ACNC: 5.34 UIU/ML (ref 0.45–4.5)
VIT B12 SERPL-MCNC: >2000 PG/ML (ref 232–1245)
WBC # BLD AUTO: 3.6 X10E3/UL (ref 3.4–10.8)

## 2018-01-26 ENCOUNTER — TELEPHONE (OUTPATIENT)
Dept: MEDICAL GROUP | Facility: PHYSICIAN GROUP | Age: 68
End: 2018-01-26

## 2018-01-26 NOTE — TELEPHONE ENCOUNTER
----- Message from VASQUEZ Young sent at 1/25/2018  3:57 PM PST -----  Please call patient (and family) and let her know that I have reviewed her recent lab results, and they are fairly stable in comparison to labs done in the past.  I believe that her fatigue and decreased appetite is likely related to her kidney failure.  They should follow-up if they have any questions or concerns, and she needs to keep all of her dialysis appointments.    Thank you!  Jordan

## 2018-01-26 NOTE — LETTER
January 26, 2018        Tere Solorzano  [id number] [facility] [address]        Dear Tere:    We have attempted to contact you several times regarding your lab results.  Please contact us at [phone] for your lab results.      If you have any questions or concerns, please don't hesitate to call.        Sincerely,        DALE Young.    Electronically Signed

## 2018-01-26 NOTE — TELEPHONE ENCOUNTER
Attempted to contact the patient.  Voicemail box was full and unable to leave a message.  Will send letter out to the patient.

## 2018-01-31 ENCOUNTER — TELEPHONE (OUTPATIENT)
Dept: NEPHROLOGY | Facility: MEDICAL CENTER | Age: 68
End: 2018-01-31

## 2018-01-31 ENCOUNTER — APPOINTMENT (OUTPATIENT)
Dept: NEPHROLOGY | Facility: MEDICAL CENTER | Age: 68
End: 2018-01-31
Payer: MEDICARE

## 2018-02-01 NOTE — TELEPHONE ENCOUNTER
Patient would like to see you in the office but your full already and her PCP told her go see her kidney doctor and she has question about her mediations   Please call her son at [phone] thank you

## 2018-02-10 ENCOUNTER — HOSPITAL ENCOUNTER (OUTPATIENT)
Dept: RADIOLOGY | Facility: MEDICAL CENTER | Age: 68
End: 2018-02-10
Attending: INTERNAL MEDICINE
Payer: MEDICARE

## 2018-02-10 DIAGNOSIS — Z11.9 SCREENING EXAMINATION FOR INFECTIOUS DISEASE: ICD-10-CM

## 2018-02-10 PROCEDURE — 71046 X-RAY EXAM CHEST 2 VIEWS: CPT

## 2018-02-13 RX ORDER — ROPINIROLE 0.25 MG/1
TABLET, FILM COATED ORAL
Qty: 90 TAB | Refills: 3 | Status: SHIPPED | OUTPATIENT
Start: 2018-02-13 | End: 2018-12-22 | Stop reason: CLARIF

## 2018-02-13 NOTE — TELEPHONE ENCOUNTER
*Currently listed as historical med*  Was the patient seen in the last year in this department? Yes     Does patient have an active prescription for medications requested? No     Received Request Via: Pharmacy    Pt met protocol?: Yes     Last OV 01/2018

## 2018-02-14 RX ORDER — HYDROXYZINE HYDROCHLORIDE 25 MG/1
TABLET, FILM COATED ORAL
Qty: 30 TAB | Refills: 2 | Status: SHIPPED | OUTPATIENT
Start: 2018-02-14 | End: 2018-05-12 | Stop reason: SDUPTHER

## 2018-02-23 ENCOUNTER — OFFICE VISIT (OUTPATIENT)
Dept: NEPHROLOGY | Facility: MEDICAL CENTER | Age: 68
End: 2018-02-23
Payer: MEDICARE

## 2018-02-23 VITALS
HEART RATE: 53 BPM | HEIGHT: 64 IN | RESPIRATION RATE: 14 BRPM | SYSTOLIC BLOOD PRESSURE: 144 MMHG | WEIGHT: 123 LBS | OXYGEN SATURATION: 96 % | DIASTOLIC BLOOD PRESSURE: 58 MMHG | TEMPERATURE: 99 F | BODY MASS INDEX: 21 KG/M2

## 2018-02-23 DIAGNOSIS — I10 ESSENTIAL HYPERTENSION: ICD-10-CM

## 2018-02-23 DIAGNOSIS — N18.6 ESRD (END STAGE RENAL DISEASE) (HCC): ICD-10-CM

## 2018-02-23 RX ORDER — MINOXIDIL 2.5 MG/1
2.5 TABLET ORAL DAILY
Qty: 30 TAB | Refills: 11 | Status: SHIPPED | OUTPATIENT
Start: 2018-02-23 | End: 2018-12-22 | Stop reason: CLARIF

## 2018-02-23 ASSESSMENT — ENCOUNTER SYMPTOMS
PALPITATIONS: 0
FEVER: 0
CHILLS: 0

## 2018-02-24 NOTE — PROGRESS NOTES
"Subjective:      Tere Solorzano is a 67 y.o. female who presents with ESRD Follow-Up            HPI  67 year old with ESRD on a MWF dialysis schedule. The patient was last hospitalized in October with HTN and fluid overload.     She notes cramping at night as well as BP elevations during the day.    Review of Systems   Constitutional: Negative for chills and fever.   Cardiovascular: Negative for chest pain and palpitations.   All other systems reviewed and are negative.         Objective:     /58   Pulse (!) 53   Temp 37.2 °C (99 °F)   Resp 14   Ht 1.626 m (5' 4\")   Wt 55.8 kg (123 lb)   LMP  (LMP Unknown)   SpO2 96%   BMI 21.11 kg/m²      Physical Exam   Constitutional: She appears well-developed and well-nourished.   Cardiovascular: Normal rate and regular rhythm.    Pulmonary/Chest: Effort normal and breath sounds normal.   Skin: Skin is warm and dry.   Psychiatric: She has a normal mood and affect. Her behavior is normal.               Assessment/Plan:     1. ESRD (end stage renal disease) (CMS-[facility])  Continue HD MWF schedule    2. Essential hypertension  Add minoxidil daily for BP control.       "

## 2018-03-02 ENCOUNTER — OFFICE VISIT (OUTPATIENT)
Dept: CARDIOLOGY | Facility: MEDICAL CENTER | Age: 68
End: 2018-03-02
Payer: MEDICARE

## 2018-03-02 VITALS
HEART RATE: 62 BPM | HEIGHT: 61 IN | OXYGEN SATURATION: 96 % | WEIGHT: 123 LBS | DIASTOLIC BLOOD PRESSURE: 62 MMHG | SYSTOLIC BLOOD PRESSURE: 160 MMHG | BODY MASS INDEX: 23.22 KG/M2

## 2018-03-02 DIAGNOSIS — I51.89 LEFT VENTRICULAR DIASTOLIC DYSFUNCTION, NYHA CLASS 3: ICD-10-CM

## 2018-03-02 DIAGNOSIS — N18.6 ESRD (END STAGE RENAL DISEASE) (HCC): ICD-10-CM

## 2018-03-02 DIAGNOSIS — I50.30 ACC/AHA STAGE C HEART FAILURE WITH PRESERVED EJECTION FRACTION (HCC): ICD-10-CM

## 2018-03-02 DIAGNOSIS — I13.10 HYPERTENSIVE HEART AND CHRONIC KIDNEY DISEASE: ICD-10-CM

## 2018-03-02 DIAGNOSIS — Z95.5 S/P CORONARY ARTERY STENT PLACEMENT: ICD-10-CM

## 2018-03-02 DIAGNOSIS — I25.10 CORONARY ARTERY DISEASE INVOLVING NATIVE CORONARY ARTERY OF NATIVE HEART WITHOUT ANGINA PECTORIS: ICD-10-CM

## 2018-03-02 DIAGNOSIS — I10 HTN (HYPERTENSION), MALIGNANT: ICD-10-CM

## 2018-03-02 PROCEDURE — 99214 OFFICE O/P EST MOD 30 MIN: CPT | Performed by: INTERNAL MEDICINE

## 2018-03-02 RX ORDER — AMLODIPINE BESYLATE 10 MG/1
1 TABLET ORAL
COMMUNITY
Start: 2016-03-23 | End: 2018-11-08

## 2018-03-02 RX ORDER — FUROSEMIDE 40 MG/1
1 TABLET ORAL
COMMUNITY
Start: 2016-03-23 | End: 2018-11-08

## 2018-03-02 RX ORDER — GLIPIZIDE 5 MG/1
1 TABLET ORAL
COMMUNITY
Start: 2016-03-23 | End: 2018-11-08

## 2018-03-02 ASSESSMENT — ENCOUNTER SYMPTOMS
PALPITATIONS: 0
HEADACHES: 0
MYALGIAS: 0
DOUBLE VISION: 0
BLURRED VISION: 0
MEMORY LOSS: 0
SENSORY CHANGE: 0
DEPRESSION: 0
DIZZINESS: 0
COUGH: 0
SHORTNESS OF BREATH: 1
FEVER: 0
DIAPHORESIS: 0
FALLS: 0
ABDOMINAL PAIN: 0
BRUISES/BLEEDS EASILY: 0

## 2018-03-02 NOTE — LETTER
[facility]   [address]  [place] [address]  Phone: [phone]  Fax: [phone]              Tere Solorzano  1950    Encounter Date: 3/2/2018    Shailesh Hernandez M.D.          PROGRESS NOTE:  Subjective:   Tere Swann is a 67 y.o. female who presents today for cardiac care and evaluation for heart failure. Of note, patient presented to hospital last year with extremely high blood pressure. She was diagnosed with hypertensive emergency. She has a history of end-stage renal disease for which she is on dialysis at this time. She has normal left ventricular systolic function and no significant valvular disease. She does have history of coronary stenting in the past in the RCA.     Patient still gets winded with daily living activities and exertion. No symptoms at rest.    Chief Complaint: dyspnea.    Past Medical History:   Diagnosis Date   • Abnormal cardiovascular stress test    • Blood clotting disorder (CMS-HCC)     with AVF   • CAD (coronary artery disease)    • Dental disorder     partial dentures- uppers   • Diabetes (CMS-HCC)     oral medication   • Dialysis patient (CMS-HCC)     [facility]   • High cholesterol    • Hyperlipidemia    • Hypertension    • Kidney disease     renal failure , on dialysis, M, W, F.   • Kidney transplant candidate      Past Surgical History:   Procedure Laterality Date   • CARDIAC CATH  9/7/2016    RCA stented with 2 Synergy drug-eluting stents.   • RECOVERY  8/16/2016    Procedure: CATH LAB [facility] WITH POSSIBLE DR. CASTILLO;  Surgeon: [facility];  Location: SURGERY PRE-POST PROC UNIT [facility];  Service:    • CARDIAC CATH  8/16/16    100% RCA   • OTHER Left 2014    left arm upper extremity fistula   • OTHER ABDOMINAL SURGERY      left kidney removed due to cancer     Family History   Problem Relation Age of Onset   • Diabetes Sister    • Other Sister      liver disease   •  Diabetes Brother    • Heart Disease Neg Hx      History   Smoking Status   • Never Smoker   Smokeless Tobacco   • Never Used     No Known Allergies  Outpatient Encounter Prescriptions as of 3/2/2018   Medication Sig Dispense Refill   • amLODIPine (NORVASC) 10 MG Tab Take 1 tablet by mouth.     • furosemide (LASIX) 40 MG Tab Take 1 tablet by mouth.     • glipiZIDE (GLUCOTROL) 5 MG Tab Take 1 tablet by mouth.     • hydrOXYzine HCl (ATARAX) 25 MG Tab TAKE 1 TABLET BY MOUTH AT BEDTIME AS NEEDED 30 Tab 2   • ROPINIRole (REQUIP) 0.25 MG Tab TAKE 1 TABLET BY MOUTH AT BEDTIME FOR RESTLESS LEG SYNDROME 90 Tab 3   • metoprolol (LOPRESSOR) 100 MG Tab Take 1 Tab by mouth 2 times a day. 60 Tab 11   • furosemide (LASIX) 40 MG Tab TAKE 1 TABLET BY MOUTH DAILY 90 Tab 3   • clopidogrel (PLAVIX) 75 MG Tab Take 1 Tab by mouth every day. 30 Tab 11   • omeprazole (PRILOSEC) 20 MG delayed-release capsule Take 20 mg by mouth every day.     • lisinopril (PRINIVIL) 20 MG Tab Take 1 Tab by mouth every day. 90 Tab 3   • amlodipine (NORVASC) 10 MG Tab Take 1 Tab by mouth every day. 90 Tab 3   • trazodone (DESYREL) 50 MG Tab Take 1 Tab by mouth every bedtime. 30 Tab 3   • Sevelamer Carbonate 800 MG Tab Take 2,400 mg by mouth 3 times a day.     • aspirin (ASA) 81 MG Chew Tab chewable tablet Take 1 Tab by mouth every day. 100 Tab 11   • minoxidil (LONITEN) 2.5 MG Tab Take 1 Tab by mouth every day. 30 Tab 11   • Multiple Vitamin (MULTI-VITAMIN DAILY PO) Take  by mouth.     • atorvastatin (LIPITOR) 20 MG Tab Take 20 mg by mouth every evening.     • glipiZIDE (GLUCOTROL) 5 MG Tab Take 1 Tab by mouth every day. 90 Tab 4     No facility-administered encounter medications on file as of 3/2/2018.      Review of Systems   Constitutional: Negative for diaphoresis and fever.   HENT: Negative for nosebleeds.    Eyes: Negative for blurred vision and double vision.   Respiratory: Positive for shortness of breath. Negative for cough.    Cardiovascular:  "Negative for chest pain and palpitations.   Gastrointestinal: Negative for abdominal pain.   Genitourinary: Negative for dysuria and frequency.   Musculoskeletal: Negative for falls and myalgias.   Skin: Negative for rash.   Neurological: Negative for dizziness, sensory change and headaches.   Endo/Heme/Allergies: Does not bruise/bleed easily.   Psychiatric/Behavioral: Negative for depression and memory loss.        Objective:   /62   Pulse 62   Ht 1.549 m (5' 1\")   Wt 55.8 kg (123 lb)   LMP  (LMP Unknown)   SpO2 96%   BMI 23.24 kg/m²      Physical Exam   Constitutional: She is oriented to person, place, and time. She appears well-developed and well-nourished.   HENT:   Head: Normocephalic and atraumatic.   Eyes: EOM are normal.   Neck: No JVD present.   Cardiovascular: Normal rate, regular rhythm, normal heart sounds and intact distal pulses.  Exam reveals no gallop and no friction rub.    No murmur heard.  Pulmonary/Chest: No respiratory distress. She has no wheezes. She has no rales. She exhibits no tenderness.   Abdominal: She exhibits no distension. There is no tenderness. There is no rebound and no guarding.   Musculoskeletal: She exhibits no edema or tenderness.   Lymphadenopathy:     She has no cervical adenopathy.   Neurological: She is alert and oriented to person, place, and time.   Skin: Skin is dry.   Psychiatric: She has a normal mood and affect.   Nursing note and vitals reviewed.      Assessment:     1. ACC/AHA stage C heart failure with preserved ejection fraction (CMS-[facility])     2. Left ventricular diastolic dysfunction, NYHA class 3     3. HTN (hypertension), malignant     4. Hypertensive heart and chronic kidney disease     5. ESRD (end stage renal disease) (CMS-[facility])     6. Coronary artery disease involving native coronary artery of native heart without angina pectoris     7. S/P coronary artery stent placement         Medical Decision Making:  Today's Assessment / Status / Plan:   "   Today, based on physical examination findings, patient is euvolemic. No JVD, lungs are clear to auscultation, no pitting edema in bilateral lower extremities, no ascites.    Dry weight is 123 lbs.    For difficult to control blood pressure. Patient develops hypotension during dialysis when tightly controlled on medications for blood pressure. Therefore we will leave her to be on the same regimen for now. Patient is advised to continue to check her blood pressure at home and keep a log.          Jordan Beal, A.P.N.  [address]  [address]  [facility] [address]  VIA In Basket

## 2018-03-03 NOTE — PROGRESS NOTES
Subjective:   Tere Swann is a 67 y.o. female who presents today for cardiac care and evaluation for heart failure. Of note, patient presented to hospital last year with extremely high blood pressure. She was diagnosed with hypertensive emergency. She has a history of end-stage renal disease for which she is on dialysis at this time. She has normal left ventricular systolic function and no significant valvular disease. She does have history of coronary stenting in the past in the RCA.     Patient still gets winded with daily living activities and exertion. No symptoms at rest.    Chief Complaint: dyspnea.    Past Medical History:   Diagnosis Date   • Abnormal cardiovascular stress test    • Blood clotting disorder (CMS-HCC)     with AVF   • CAD (coronary artery disease)    • Dental disorder     partial dentures- uppers   • Diabetes (CMS-HCC)     oral medication   • Dialysis patient (CMS-HCC)     [facility]   • High cholesterol    • Hyperlipidemia    • Hypertension    • Kidney disease     renal failure , on dialysis, M, W, F.   • Kidney transplant candidate      Past Surgical History:   Procedure Laterality Date   • CARDIAC CATH  9/7/2016    RCA stented with 2 Synergy drug-eluting stents.   • RECOVERY  8/16/2016    Procedure: CATH LAB [facility] WITH POSSIBLE DR. CASTILLO;  Surgeon: [facility];  Location: SURGERY PRE-POST PROC UNIT [facility];  Service:    • CARDIAC CATH  8/16/16    100% RCA   • OTHER Left 2014    left arm upper extremity fistula   • OTHER ABDOMINAL SURGERY      left kidney removed due to cancer     Family History   Problem Relation Age of Onset   • Diabetes Sister    • Other Sister      liver disease   • Diabetes Brother    • Heart Disease Neg Hx      History   Smoking Status   • Never Smoker   Smokeless Tobacco   • Never Used     No Known Allergies  Outpatient Encounter Prescriptions as of 3/2/2018   Medication Sig Dispense Refill   • amLODIPine (NORVASC) 10 MG Tab Take 1 tablet  by mouth.     • furosemide (LASIX) 40 MG Tab Take 1 tablet by mouth.     • glipiZIDE (GLUCOTROL) 5 MG Tab Take 1 tablet by mouth.     • hydrOXYzine HCl (ATARAX) 25 MG Tab TAKE 1 TABLET BY MOUTH AT BEDTIME AS NEEDED 30 Tab 2   • ROPINIRole (REQUIP) 0.25 MG Tab TAKE 1 TABLET BY MOUTH AT BEDTIME FOR RESTLESS LEG SYNDROME 90 Tab 3   • metoprolol (LOPRESSOR) 100 MG Tab Take 1 Tab by mouth 2 times a day. 60 Tab 11   • furosemide (LASIX) 40 MG Tab TAKE 1 TABLET BY MOUTH DAILY 90 Tab 3   • clopidogrel (PLAVIX) 75 MG Tab Take 1 Tab by mouth every day. 30 Tab 11   • omeprazole (PRILOSEC) 20 MG delayed-release capsule Take 20 mg by mouth every day.     • lisinopril (PRINIVIL) 20 MG Tab Take 1 Tab by mouth every day. 90 Tab 3   • amlodipine (NORVASC) 10 MG Tab Take 1 Tab by mouth every day. 90 Tab 3   • trazodone (DESYREL) 50 MG Tab Take 1 Tab by mouth every bedtime. 30 Tab 3   • Sevelamer Carbonate 800 MG Tab Take 2,400 mg by mouth 3 times a day.     • aspirin (ASA) 81 MG Chew Tab chewable tablet Take 1 Tab by mouth every day. 100 Tab 11   • minoxidil (LONITEN) 2.5 MG Tab Take 1 Tab by mouth every day. 30 Tab 11   • Multiple Vitamin (MULTI-VITAMIN DAILY PO) Take  by mouth.     • atorvastatin (LIPITOR) 20 MG Tab Take 20 mg by mouth every evening.     • glipiZIDE (GLUCOTROL) 5 MG Tab Take 1 Tab by mouth every day. 90 Tab 4     No facility-administered encounter medications on file as of 3/2/2018.      Review of Systems   Constitutional: Negative for diaphoresis and fever.   HENT: Negative for nosebleeds.    Eyes: Negative for blurred vision and double vision.   Respiratory: Positive for shortness of breath. Negative for cough.    Cardiovascular: Negative for chest pain and palpitations.   Gastrointestinal: Negative for abdominal pain.   Genitourinary: Negative for dysuria and frequency.   Musculoskeletal: Negative for falls and myalgias.   Skin: Negative for rash.   Neurological: Negative for dizziness, sensory change and  "headaches.   Endo/Heme/Allergies: Does not bruise/bleed easily.   Psychiatric/Behavioral: Negative for depression and memory loss.        Objective:   /62   Pulse 62   Ht 1.549 m (5' 1\")   Wt 55.8 kg (123 lb)   LMP  (LMP Unknown)   SpO2 96%   BMI 23.24 kg/m²     Physical Exam   Constitutional: She is oriented to person, place, and time. She appears well-developed and well-nourished.   HENT:   Head: Normocephalic and atraumatic.   Eyes: EOM are normal.   Neck: No JVD present.   Cardiovascular: Normal rate, regular rhythm, normal heart sounds and intact distal pulses.  Exam reveals no gallop and no friction rub.    No murmur heard.  Pulmonary/Chest: No respiratory distress. She has no wheezes. She has no rales. She exhibits no tenderness.   Abdominal: She exhibits no distension. There is no tenderness. There is no rebound and no guarding.   Musculoskeletal: She exhibits no edema or tenderness.   Lymphadenopathy:     She has no cervical adenopathy.   Neurological: She is alert and oriented to person, place, and time.   Skin: Skin is dry.   Psychiatric: She has a normal mood and affect.   Nursing note and vitals reviewed.      Assessment:     1. ACC/AHA stage C heart failure with preserved ejection fraction (CMS-HCC)     2. Left ventricular diastolic dysfunction, NYHA class 3     3. HTN (hypertension), malignant     4. Hypertensive heart and chronic kidney disease     5. ESRD (end stage renal disease) (CMS-[facility])     6. Coronary artery disease involving native coronary artery of native heart without angina pectoris     7. S/P coronary artery stent placement         Medical Decision Making:  Today's Assessment / Status / Plan:   Today, based on physical examination findings, patient is euvolemic. No JVD, lungs are clear to auscultation, no pitting edema in bilateral lower extremities, no ascites.    Dry weight is 123 lbs.    For difficult to control blood pressure. Patient develops hypotension during dialysis " when tightly controlled on medications for blood pressure. Therefore we will leave her to be on the same regimen for now. Patient is advised to continue to check her blood pressure at home and keep a log.

## 2018-04-02 NOTE — PROGRESS NOTES
Heart Failure New Appointment     6MWT- refused, feels too tired.  MLWHF- not completed.    Per MD, HF education not indicated as patient has ESRD on dialysis.     OP Heart Failure  Vitals  Weight: 59.4 kg (131 lb)  Height: 152.4 cm (5')  BMI (Calculated): 25.58  Blood Pressure : (!) 184/70  Pulse: 74    CLEO Gordon RN  [phone]

## 2018-05-14 RX ORDER — HYDROXYZINE HYDROCHLORIDE 25 MG/1
TABLET, FILM COATED ORAL
Qty: 30 TAB | Refills: 2 | Status: SHIPPED | OUTPATIENT
Start: 2018-05-14 | End: 2018-08-10 | Stop reason: SDUPTHER

## 2018-07-25 DIAGNOSIS — I10 BENIGN ESSENTIAL HTN: ICD-10-CM

## 2018-07-27 RX ORDER — AMLODIPINE BESYLATE 10 MG/1
10 TABLET ORAL
Qty: 90 TAB | Refills: 3 | OUTPATIENT
Start: 2018-07-27

## 2018-08-10 NOTE — TELEPHONE ENCOUNTER
Was the patient seen in the last year in this department? Yes    Does patient have an active prescription for medications requested? No     Received Request Via: Patient      Pt met protocol?: Yes, OV 1/18

## 2018-08-13 RX ORDER — HYDROXYZINE HYDROCHLORIDE 25 MG/1
TABLET, FILM COATED ORAL
Qty: 30 TAB | Refills: 2 | Status: SHIPPED | OUTPATIENT
Start: 2018-08-13 | End: 2018-08-22

## 2018-08-20 DIAGNOSIS — R94.39 ABNORMAL CARDIOVASCULAR STRESS TEST: Chronic | ICD-10-CM

## 2018-08-20 DIAGNOSIS — I10 ESSENTIAL HYPERTENSION: ICD-10-CM

## 2018-08-20 RX ORDER — ASPIRIN 81 MG/1
81 TABLET, CHEWABLE ORAL DAILY
Qty: 90 TAB | Refills: 3 | OUTPATIENT
Start: 2018-08-20

## 2018-08-21 DIAGNOSIS — R94.39 ABNORMAL CARDIOVASCULAR STRESS TEST: Chronic | ICD-10-CM

## 2018-08-21 DIAGNOSIS — I10 ESSENTIAL HYPERTENSION: ICD-10-CM

## 2018-08-21 RX ORDER — LORATADINE 10 MG
81 TABLET ORAL DAILY
Qty: 90 TAB | Refills: 1 | Status: SHIPPED | OUTPATIENT
Start: 2018-08-21 | End: 2018-12-22 | Stop reason: CLARIF

## 2018-08-22 RX ORDER — HYDROXYZINE HYDROCHLORIDE 25 MG/1
TABLET, FILM COATED ORAL
Qty: 90 TAB | Refills: 0 | Status: SHIPPED | OUTPATIENT
Start: 2018-08-22 | End: 2018-12-22 | Stop reason: CLARIF

## 2018-11-08 ENCOUNTER — OFFICE VISIT (OUTPATIENT)
Dept: MEDICAL GROUP | Facility: PHYSICIAN GROUP | Age: 68
End: 2018-11-08
Payer: MEDICARE

## 2018-11-08 VITALS
TEMPERATURE: 98.7 F | RESPIRATION RATE: 12 BRPM | DIASTOLIC BLOOD PRESSURE: 68 MMHG | BODY MASS INDEX: 24.7 KG/M2 | SYSTOLIC BLOOD PRESSURE: 202 MMHG | HEART RATE: 88 BPM | OXYGEN SATURATION: 97 % | WEIGHT: 130.8 LBS | HEIGHT: 61 IN

## 2018-11-08 DIAGNOSIS — N18.6 END STAGE RENAL FAILURE ON DIALYSIS (HCC): ICD-10-CM

## 2018-11-08 DIAGNOSIS — N18.6 ESRD (END STAGE RENAL DISEASE) ON DIALYSIS (HCC): ICD-10-CM

## 2018-11-08 DIAGNOSIS — I51.89 LEFT VENTRICULAR DIASTOLIC DYSFUNCTION, NYHA CLASS 3: ICD-10-CM

## 2018-11-08 DIAGNOSIS — Z99.2 ESRD (END STAGE RENAL DISEASE) ON DIALYSIS (HCC): ICD-10-CM

## 2018-11-08 DIAGNOSIS — I13.10 HYPERTENSIVE HEART AND CHRONIC KIDNEY DISEASE: ICD-10-CM

## 2018-11-08 DIAGNOSIS — I10 HTN (HYPERTENSION), MALIGNANT: ICD-10-CM

## 2018-11-08 DIAGNOSIS — R10.12 LUQ PAIN: ICD-10-CM

## 2018-11-08 DIAGNOSIS — Z23 NEED FOR VACCINATION: ICD-10-CM

## 2018-11-08 DIAGNOSIS — K21.9 GASTROESOPHAGEAL REFLUX DISEASE, ESOPHAGITIS PRESENCE NOT SPECIFIED: ICD-10-CM

## 2018-11-08 DIAGNOSIS — Z99.2 END STAGE RENAL FAILURE ON DIALYSIS (HCC): ICD-10-CM

## 2018-11-08 DIAGNOSIS — I50.30 ACC/AHA STAGE C HEART FAILURE WITH PRESERVED EJECTION FRACTION (HCC): ICD-10-CM

## 2018-11-08 PROCEDURE — 99214 OFFICE O/P EST MOD 30 MIN: CPT | Mod: 25 | Performed by: NURSE PRACTITIONER

## 2018-11-08 PROCEDURE — 90662 IIV NO PRSV INCREASED AG IM: CPT | Performed by: NURSE PRACTITIONER

## 2018-11-08 PROCEDURE — G0008 ADMIN INFLUENZA VIRUS VAC: HCPCS | Performed by: NURSE PRACTITIONER

## 2018-11-08 RX ORDER — OMEPRAZOLE 20 MG/1
20 CAPSULE, DELAYED RELEASE ORAL DAILY
Qty: 30 CAP | Refills: 2 | Status: SHIPPED | OUTPATIENT
Start: 2018-11-08 | End: 2018-12-22 | Stop reason: CLARIF

## 2018-11-08 ASSESSMENT — PATIENT HEALTH QUESTIONNAIRE - PHQ9: CLINICAL INTERPRETATION OF PHQ2 SCORE: 0

## 2018-11-09 RX ORDER — LISINOPRIL 20 MG/1
20 TABLET ORAL DAILY
Qty: 30 TAB | Refills: 0 | Status: SHIPPED | OUTPATIENT
Start: 2018-11-09 | End: 2018-12-22 | Stop reason: CLARIF

## 2018-11-12 PROBLEM — R10.12 LUQ PAIN: Status: ACTIVE | Noted: 2018-11-12

## 2018-11-13 ENCOUNTER — TELEPHONE (OUTPATIENT)
Dept: CARDIOLOGY | Facility: MEDICAL CENTER | Age: 68
End: 2018-11-13

## 2018-11-13 DIAGNOSIS — I10 HTN (HYPERTENSION), MALIGNANT: ICD-10-CM

## 2018-11-13 DIAGNOSIS — I51.89 LEFT VENTRICULAR DIASTOLIC DYSFUNCTION, NYHA CLASS 3: ICD-10-CM

## 2018-11-13 DIAGNOSIS — I13.10 HYPERTENSIVE HEART AND CHRONIC KIDNEY DISEASE: ICD-10-CM

## 2018-11-13 DIAGNOSIS — I50.30 ACC/AHA STAGE C HEART FAILURE WITH PRESERVED EJECTION FRACTION (HCC): ICD-10-CM

## 2018-11-13 NOTE — ASSESSMENT & PLAN NOTE
Has been having issues with left upper quadrant pain, which radiates around to her left flank at times.  She does not have a kidney on the left side.  She reports that it is a burning pain, and is typically better after she eats something.  Omeprazole is on her med list, but patient states that she hasn't bene taking it.  Discussed plan to restart omeprazole and refer her to GI.

## 2018-11-13 NOTE — ASSESSMENT & PLAN NOTE
Patient is now on the transplant list through [facility] in [place].  Her recent lab tests are available through [facility] Everywhere.

## 2018-11-13 NOTE — PROGRESS NOTES
Chief Complaint   Patient presents with   • Other     ESRD   • Abdominal Pain     x 1 month       HISTORY OF PRESENT ILLNESS: Patient is a 67 y.o. female established patient who presents today to discuss the following issues:    End stage renal failure on dialysis (CMS-[facility]) ([facility])  Patient is now on the transplant list through [facility] in [place].  Her recent lab tests are available through [facility] Everywhere.     LUQ pain  Has been having issues with left upper quadrant pain, which radiates around to her left flank at times.  She does not have a kidney on the left side.  She reports that it is a burning pain, and is typically better after she eats something.  Omeprazole is on her med list, but patient states that she hasn't bene taking it.  Discussed plan to restart omeprazole and refer her to GI.       Patient Active Problem List    Diagnosis Date Noted   • LUQ pain 11/12/2018   • No appetite 01/22/2018   • Acute hypoxemic respiratory failure ([facility]) 10/17/2017   • ESRD (end stage renal disease) ([facility]) 10/16/2017   • Acute on chronic diastolic CHF (congestive heart failure) ([facility]) 10/16/2017   • Chronic total occlusion of native coronary artery 09/07/2016   • S/P coronary artery stent placement 09/07/2016   • Renal hypertension 08/05/2016   • End stage renal failure on dialysis ([facility]) 08/05/2016   • Type 2 diabetes mellitus ([facility]) 08/05/2016   • Kidney transplant candidate    • Abnormal cardiovascular stress test    • Essential hypertension 09/17/2013       Allergies:Patient has no known allergies.    Current Outpatient Prescriptions   Medication Sig Dispense Refill   • omeprazole (PRILOSEC) 20 MG delayed-release capsule Take 1 Cap by mouth every day. 30 Cap 2   • hydrOXYzine HCl (ATARAX) 25 MG Tab TAKE 1 TABLET BY MOUTH EVERY DAY AT BEDTIME AS NEEDED 90 Tab 0   • [facility] ASPIRIN ADULT LOW DOSE 81 MG Chew Tab chewable tablet TAKE 1 TAB BY MOUTH EVERY DAY. 90 Tab 1   • minoxidil (LONITEN) 2.5 MG Tab Take 1 Tab by mouth every  day. 30 Tab 11   • ROPINIRole (REQUIP) 0.25 MG Tab TAKE 1 TABLET BY MOUTH AT BEDTIME FOR RESTLESS LEG SYNDROME 90 Tab 3   • metoprolol (LOPRESSOR) 100 MG Tab Take 1 Tab by mouth 2 times a day. 60 Tab 11   • furosemide (LASIX) 40 MG Tab TAKE 1 TABLET BY MOUTH DAILY 90 Tab 3   • atorvastatin (LIPITOR) 20 MG Tab Take 20 mg by mouth every evening.     • lisinopril (PRINIVIL) 20 MG Tab Take 1 Tab by mouth every day. Needs to be seen for further refills. Thank you 30 Tab 0   • clopidogrel (PLAVIX) 75 MG Tab Take 1 Tab by mouth every day. (Patient not taking: Reported on 2018) 30 Tab 11   • Multiple Vitamin (MULTI-VITAMIN DAILY PO) Take  by mouth.     • amlodipine (NORVASC) 10 MG Tab Take 1 Tab by mouth every day. (Patient not taking: Reported on 2018) 90 Tab 3   • trazodone (DESYREL) 50 MG Tab Take 1 Tab by mouth every bedtime. (Patient not taking: Reported on 2018) 30 Tab 3   • Sevelamer Carbonate 800 MG Tab Take 2,400 mg by mouth 3 times a day.     • glipiZIDE (GLUCOTROL) 5 MG Tab Take 1 Tab by mouth every day. (Patient not taking: Reported on 2018) 90 Tab 4     No current facility-administered medications for this visit.        Social History   Substance Use Topics   • Smoking status: Never Smoker   • Smokeless tobacco: Never Used   • Alcohol use No       Family Status   Relation Status   • Mo  at age 20+        ? CHF, had ascities   • Fa  at age 93        old age   • Sis  at age 68        diabetes   • Bro Alive   • Neg Hx (Not Specified)     Family History   Problem Relation Age of Onset   • Diabetes Sister    • Other Sister         liver disease   • Diabetes Brother    • Heart Disease Neg Hx        Review of Systems:   Constitutional: Negative for fever, chills, weight loss and malaise/fatigue.   HENT: Negative for ear pain, nosebleeds, congestion, sore throat and neck pain.    Eyes: Negative for blurred vision.   Respiratory: Negative for cough, sputum production,  "shortness of breath and wheezing.    Cardiovascular: Negative for chest pain, palpitations, orthopnea and leg swelling.   Gastrointestinal: Negative for heartburn, nausea, and vomiting.  Positive for left upper quadrant abdominal pain.   Genitourinary: Negative for dysuria, urgency and frequency.   Musculoskeletal: Negative for myalgias, joint pain, and back pain.  Skin: Negative for rash and itching.   Neurological: Negative for dizziness, tingling, tremors, sensory change, focal weakness and headaches.   Endo/Heme/Allergies: Does not bruise/bleed easily.   Psychiatric/Behavioral: Negative for depression, suicidal ideas and memory loss.  The patient is not nervous/anxious and does not have insomnia.    All other systems reviewed and are negative except as in HPI.    Exam:  Blood pressure (!) 202/68, pulse 88, temperature 37.1 °C (98.7 °F), temperature source Temporal, resp. rate 12, height 1.549 m (5' 1\"), weight 59.3 kg (130 lb 12.8 oz), SpO2 97 %, not currently breastfeeding.  General:  Well nourished, well developed female in NAD  Head: Grossly normal.  Neck: Supple without JVD or bruit. Thyroid is not enlarged.  Pulmonary: Clear to ausculation. Normal effort. No rales, ronchi, or wheezing.  Cardiovascular: Regular rate and rhythm without murmur.   Abdomen:  Soft, nontender, nondistended. No rebound, no guarding.  Extremities: No clubbing, cyanosis, or edema.  Skin: Intact with no obvious rashes or lesions.  Neuro: Grossly intact.  Psych: Alert and oriented x 3.  Mood and affect appropriate.    Medical decision-making and discussion: Tere is here today for follow-up.  We reviewed her medical records.  She will restart omeprazole, and a referral was sent to gastroenterology.  She will keep all appointments with her specialists, and she will follow-up here as needed.          Assessment/Plan:  1. Need for vaccination  INFLUENZA VACCINE, HIGH DOSE (65+ ONLY)   2. Gastroesophageal reflux disease, esophagitis " presence not specified  omeprazole (PRILOSEC) 20 MG delayed-release capsule    REFERRAL TO GASTROENTEROLOGY   3. LUQ pain  REFERRAL TO GASTROENTEROLOGY   4. ESRD (end stage renal disease) on dialysis (HCC)  REFERRAL TO GASTROENTEROLOGY   5. End stage renal failure on dialysis (HCC)         Return if symptoms worsen or fail to improve.    Please note that this dictation was created using voice recognition software. I have made every reasonable attempt to correct obvious errors, but I expect that there are errors of grammar and possibly content that I did not discover before finalizing the note.

## 2018-11-14 RX ORDER — LISINOPRIL 20 MG/1
20 TABLET ORAL DAILY
Qty: 90 TAB | Refills: 0 | Status: SHIPPED | OUTPATIENT
Start: 2018-11-14 | End: 2018-12-22 | Stop reason: CLARIF

## 2018-11-21 ENCOUNTER — TELEPHONE (OUTPATIENT)
Dept: CARDIOLOGY | Facility: MEDICAL CENTER | Age: 68
End: 2018-11-21

## 2018-11-21 NOTE — TELEPHONE ENCOUNTER
Left message for pt to call back and schedule a Heart Failure Established with Dr Hernandez or Leidy Clay per message received from Eileen. This is the third call in the last month for pt and letter has been sent previously.

## 2018-12-10 DIAGNOSIS — I10 ESSENTIAL HYPERTENSION, MALIGNANT: ICD-10-CM

## 2018-12-10 RX ORDER — FUROSEMIDE 40 MG/1
TABLET ORAL
Qty: 90 TAB | Refills: 3 | Status: SHIPPED | OUTPATIENT
Start: 2018-12-10 | End: 2018-12-22 | Stop reason: CLARIF

## 2018-12-22 ENCOUNTER — OFFICE VISIT (OUTPATIENT)
Dept: URGENT CARE | Facility: CLINIC | Age: 68
End: 2018-12-22
Payer: MEDICARE

## 2018-12-22 ENCOUNTER — HOSPITAL ENCOUNTER (INPATIENT)
Facility: MEDICAL CENTER | Age: 68
LOS: 1 days | DRG: 304 | End: 2018-12-25
Attending: EMERGENCY MEDICINE | Admitting: INTERNAL MEDICINE
Payer: MEDICARE

## 2018-12-22 VITALS
WEIGHT: 128 LBS | OXYGEN SATURATION: 97 % | SYSTOLIC BLOOD PRESSURE: 204 MMHG | DIASTOLIC BLOOD PRESSURE: 68 MMHG | TEMPERATURE: 96.8 F | BODY MASS INDEX: 25.13 KG/M2 | HEART RATE: 54 BPM | HEIGHT: 60 IN

## 2018-12-22 DIAGNOSIS — I50.30 ACC/AHA STAGE C HEART FAILURE WITH PRESERVED EJECTION FRACTION (HCC): ICD-10-CM

## 2018-12-22 DIAGNOSIS — R42 LIGHT HEADEDNESS: ICD-10-CM

## 2018-12-22 DIAGNOSIS — I13.10 HYPERTENSIVE HEART AND CHRONIC KIDNEY DISEASE: ICD-10-CM

## 2018-12-22 DIAGNOSIS — I10 HTN (HYPERTENSION), MALIGNANT: ICD-10-CM

## 2018-12-22 DIAGNOSIS — I51.89 LEFT VENTRICULAR DIASTOLIC DYSFUNCTION, NYHA CLASS 3: ICD-10-CM

## 2018-12-22 DIAGNOSIS — R53.1 WEAKNESS: ICD-10-CM

## 2018-12-22 DIAGNOSIS — I10 ESSENTIAL HYPERTENSION: ICD-10-CM

## 2018-12-22 PROBLEM — I16.0 HYPERTENSIVE URGENCY: Status: ACTIVE | Noted: 2018-12-22

## 2018-12-22 PROBLEM — R74.01 TRANSAMINITIS: Status: ACTIVE | Noted: 2018-12-22

## 2018-12-22 LAB
ALBUMIN SERPL BCP-MCNC: 4.4 G/DL (ref 3.2–4.9)
ALBUMIN/GLOB SERPL: 1.5 G/DL
ALP SERPL-CCNC: 219 U/L (ref 30–99)
ALT SERPL-CCNC: 46 U/L (ref 2–50)
ANION GAP SERPL CALC-SCNC: 11 MMOL/L (ref 0–11.9)
ANISOCYTOSIS BLD QL SMEAR: ABNORMAL
AST SERPL-CCNC: 51 U/L (ref 12–45)
BASOPHILS # BLD AUTO: 0.9 % (ref 0–1.8)
BASOPHILS # BLD: 0.03 K/UL (ref 0–0.12)
BILIRUB SERPL-MCNC: 0.6 MG/DL (ref 0.1–1.5)
BUN SERPL-MCNC: 28 MG/DL (ref 8–22)
CALCIUM SERPL-MCNC: 10.1 MG/DL (ref 8.5–10.5)
CHLORIDE SERPL-SCNC: 93 MMOL/L (ref 96–112)
CO2 SERPL-SCNC: 34 MMOL/L (ref 20–33)
CREAT SERPL-MCNC: 6.16 MG/DL (ref 0.5–1.4)
EKG IMPRESSION: NORMAL
EOSINOPHIL # BLD AUTO: 0 K/UL (ref 0–0.51)
EOSINOPHIL NFR BLD: 0 % (ref 0–6.9)
ERYTHROCYTE [DISTWIDTH] IN BLOOD BY AUTOMATED COUNT: 46.5 FL (ref 35.9–50)
GIANT PLATELETS BLD QL SMEAR: NORMAL
GLOBULIN SER CALC-MCNC: 2.9 G/DL (ref 1.9–3.5)
GLUCOSE SERPL-MCNC: 188 MG/DL (ref 65–99)
HCT VFR BLD AUTO: 31.2 % (ref 37–47)
HGB BLD-MCNC: 10.2 G/DL (ref 12–16)
LIPASE SERPL-CCNC: 48 U/L (ref 11–82)
LYMPHOCYTES # BLD AUTO: 0.54 K/UL (ref 1–4.8)
LYMPHOCYTES NFR BLD: 17.9 % (ref 22–41)
MANUAL DIFF BLD: NORMAL
MCH RBC QN AUTO: 29.8 PG (ref 27–33)
MCHC RBC AUTO-ENTMCNC: 32.7 G/DL (ref 33.6–35)
MCV RBC AUTO: 91.2 FL (ref 81.4–97.8)
MICROCYTES BLD QL SMEAR: ABNORMAL
MONOCYTES # BLD AUTO: 0.21 K/UL (ref 0–0.85)
MONOCYTES NFR BLD AUTO: 7.1 % (ref 0–13.4)
MORPHOLOGY BLD-IMP: NORMAL
NEUTROPHILS # BLD AUTO: 2.22 K/UL (ref 2–7.15)
NEUTROPHILS NFR BLD: 74.1 % (ref 44–72)
NRBC # BLD AUTO: 0 K/UL
NRBC BLD-RTO: 0 /100 WBC
OVALOCYTES BLD QL SMEAR: NORMAL
PLATELET # BLD AUTO: 126 K/UL (ref 164–446)
PLATELET BLD QL SMEAR: NORMAL
PMV BLD AUTO: 13.3 FL (ref 9–12.9)
POIKILOCYTOSIS BLD QL SMEAR: NORMAL
POTASSIUM SERPL-SCNC: 4.3 MMOL/L (ref 3.6–5.5)
PROT SERPL-MCNC: 7.3 G/DL (ref 6–8.2)
RBC # BLD AUTO: 3.42 M/UL (ref 4.2–5.4)
RBC BLD AUTO: PRESENT
SMUDGE CELLS BLD QL SMEAR: NORMAL
SODIUM SERPL-SCNC: 138 MMOL/L (ref 135–145)
TROPONIN I SERPL-MCNC: 0.04 NG/ML (ref 0–0.04)
TSH SERPL DL<=0.005 MIU/L-ACNC: 3.44 UIU/ML (ref 0.38–5.33)
WBC # BLD AUTO: 3 K/UL (ref 4.8–10.8)

## 2018-12-22 PROCEDURE — 93005 ELECTROCARDIOGRAM TRACING: CPT

## 2018-12-22 PROCEDURE — 96375 TX/PRO/DX INJ NEW DRUG ADDON: CPT

## 2018-12-22 PROCEDURE — 93000 ELECTROCARDIOGRAM COMPLETE: CPT | Performed by: NURSE PRACTITIONER

## 2018-12-22 PROCEDURE — 84443 ASSAY THYROID STIM HORMONE: CPT

## 2018-12-22 PROCEDURE — 85027 COMPLETE CBC AUTOMATED: CPT

## 2018-12-22 PROCEDURE — G0378 HOSPITAL OBSERVATION PER HR: HCPCS

## 2018-12-22 PROCEDURE — 700102 HCHG RX REV CODE 250 W/ 637 OVERRIDE(OP): Performed by: EMERGENCY MEDICINE

## 2018-12-22 PROCEDURE — 80053 COMPREHEN METABOLIC PANEL: CPT

## 2018-12-22 PROCEDURE — 85007 BL SMEAR W/DIFF WBC COUNT: CPT

## 2018-12-22 PROCEDURE — 700111 HCHG RX REV CODE 636 W/ 250 OVERRIDE (IP): Performed by: HOSPITALIST

## 2018-12-22 PROCEDURE — A9270 NON-COVERED ITEM OR SERVICE: HCPCS | Performed by: EMERGENCY MEDICINE

## 2018-12-22 PROCEDURE — 83690 ASSAY OF LIPASE: CPT

## 2018-12-22 PROCEDURE — 700102 HCHG RX REV CODE 250 W/ 637 OVERRIDE(OP): Performed by: HOSPITALIST

## 2018-12-22 PROCEDURE — 304561 HCHG STAT O2

## 2018-12-22 PROCEDURE — 80074 ACUTE HEPATITIS PANEL: CPT

## 2018-12-22 PROCEDURE — 96374 THER/PROPH/DIAG INJ IV PUSH: CPT

## 2018-12-22 PROCEDURE — A9270 NON-COVERED ITEM OR SERVICE: HCPCS | Performed by: HOSPITALIST

## 2018-12-22 PROCEDURE — 99220 PR INITIAL OBSERVATION CARE,LEVL III: CPT | Performed by: HOSPITALIST

## 2018-12-22 PROCEDURE — 93005 ELECTROCARDIOGRAM TRACING: CPT | Performed by: EMERGENCY MEDICINE

## 2018-12-22 PROCEDURE — 99215 OFFICE O/P EST HI 40 MIN: CPT | Performed by: NURSE PRACTITIONER

## 2018-12-22 PROCEDURE — 99285 EMERGENCY DEPT VISIT HI MDM: CPT

## 2018-12-22 PROCEDURE — 84484 ASSAY OF TROPONIN QUANT: CPT

## 2018-12-22 RX ORDER — SEVELAMER CARBONATE 800 MG/1
2400 TABLET, FILM COATED ORAL 3 TIMES DAILY
Status: DISCONTINUED | OUTPATIENT
Start: 2018-12-23 | End: 2018-12-25 | Stop reason: HOSPADM

## 2018-12-22 RX ORDER — BISACODYL 10 MG
10 SUPPOSITORY, RECTAL RECTAL
Status: DISCONTINUED | OUTPATIENT
Start: 2018-12-22 | End: 2018-12-25 | Stop reason: HOSPADM

## 2018-12-22 RX ORDER — HYDRALAZINE HYDROCHLORIDE 20 MG/ML
10 INJECTION INTRAMUSCULAR; INTRAVENOUS EVERY 6 HOURS PRN
Status: DISCONTINUED | OUTPATIENT
Start: 2018-12-22 | End: 2018-12-25 | Stop reason: HOSPADM

## 2018-12-22 RX ORDER — METOPROLOL TARTRATE 50 MG/1
100 TABLET, FILM COATED ORAL ONCE
Status: COMPLETED | OUTPATIENT
Start: 2018-12-22 | End: 2018-12-22

## 2018-12-22 RX ORDER — OMEPRAZOLE 20 MG/1
20 CAPSULE, DELAYED RELEASE ORAL DAILY
Status: DISCONTINUED | OUTPATIENT
Start: 2018-12-23 | End: 2018-12-22

## 2018-12-22 RX ORDER — METOPROLOL TARTRATE 100 MG/1
100 TABLET ORAL 2 TIMES DAILY
Status: ON HOLD | COMMUNITY
End: 2018-12-25

## 2018-12-22 RX ORDER — POLYETHYLENE GLYCOL 3350 17 G/17G
1 POWDER, FOR SOLUTION ORAL
Status: DISCONTINUED | OUTPATIENT
Start: 2018-12-22 | End: 2018-12-25 | Stop reason: HOSPADM

## 2018-12-22 RX ORDER — ASPIRIN 81 MG/1
81 TABLET, CHEWABLE ORAL DAILY
Status: DISCONTINUED | OUTPATIENT
Start: 2018-12-23 | End: 2018-12-25 | Stop reason: HOSPADM

## 2018-12-22 RX ORDER — FUROSEMIDE 40 MG/1
40 TABLET ORAL
Status: DISCONTINUED | OUTPATIENT
Start: 2018-12-23 | End: 2018-12-25 | Stop reason: HOSPADM

## 2018-12-22 RX ORDER — ASPIRIN 81 MG/1
81 TABLET, CHEWABLE ORAL DAILY
COMMUNITY
End: 2020-03-24

## 2018-12-22 RX ORDER — LISINOPRIL 20 MG/1
20 TABLET ORAL DAILY
Status: ON HOLD | COMMUNITY
End: 2018-12-25

## 2018-12-22 RX ORDER — METOPROLOL TARTRATE 50 MG/1
100 TABLET, FILM COATED ORAL 2 TIMES DAILY
Status: DISCONTINUED | OUTPATIENT
Start: 2018-12-22 | End: 2018-12-24

## 2018-12-22 RX ORDER — HYDROXYZINE HYDROCHLORIDE 25 MG/1
25 TABLET, FILM COATED ORAL
COMMUNITY
End: 2019-02-26

## 2018-12-22 RX ORDER — ROPINIROLE 0.25 MG/1
0.25 TABLET, FILM COATED ORAL
COMMUNITY
End: 2019-02-23

## 2018-12-22 RX ORDER — LISINOPRIL 20 MG/1
20 TABLET ORAL DAILY
Status: DISCONTINUED | OUTPATIENT
Start: 2018-12-23 | End: 2018-12-23

## 2018-12-22 RX ORDER — FUROSEMIDE 40 MG/1
40 TABLET ORAL DAILY
COMMUNITY
End: 2020-01-07

## 2018-12-22 RX ORDER — AMOXICILLIN 250 MG
2 CAPSULE ORAL 2 TIMES DAILY
Status: DISCONTINUED | OUTPATIENT
Start: 2018-12-22 | End: 2018-12-25 | Stop reason: HOSPADM

## 2018-12-22 RX ADMIN — HYDRALAZINE HYDROCHLORIDE 10 MG: 20 INJECTION INTRAMUSCULAR; INTRAVENOUS at 23:46

## 2018-12-22 RX ADMIN — METOPROLOL TARTRATE 100 MG: 50 TABLET ORAL at 21:14

## 2018-12-22 RX ADMIN — STANDARDIZED SENNA CONCENTRATE AND DOCUSATE SODIUM 2 TABLET: 8.6; 5 TABLET, FILM COATED ORAL at 23:45

## 2018-12-22 ASSESSMENT — ENCOUNTER SYMPTOMS
ABDOMINAL PAIN: 1
SORE THROAT: 0
DIZZINESS: 1
FOCAL WEAKNESS: 0
SHORTNESS OF BREATH: 0
WHEEZING: 0
WEAKNESS: 1
EYES NEGATIVE: 1
NAUSEA: 1
TINGLING: 0
NAUSEA: 0
PALPITATIONS: 0
HEADACHES: 0
COUGH: 0
FEVER: 0
MUSCULOSKELETAL NEGATIVE: 1
MYALGIAS: 0
PHOTOPHOBIA: 0
DIARRHEA: 0
DIAPHORESIS: 1
DEPRESSION: 0
VOMITING: 0
DIZZINESS: 1
RESPIRATORY NEGATIVE: 1
CHILLS: 0

## 2018-12-22 ASSESSMENT — PAIN SCALES - GENERAL
PAINLEVEL_OUTOF10: 0
PAINLEVEL_OUTOF10: 0

## 2018-12-22 ASSESSMENT — PATIENT HEALTH QUESTIONNAIRE - PHQ9
1. LITTLE INTEREST OR PLEASURE IN DOING THINGS: NOT AT ALL
2. FEELING DOWN, DEPRESSED, IRRITABLE, OR HOPELESS: NOT AT ALL
SUM OF ALL RESPONSES TO PHQ9 QUESTIONS 1 AND 2: 0

## 2018-12-23 ENCOUNTER — APPOINTMENT (OUTPATIENT)
Dept: RADIOLOGY | Facility: MEDICAL CENTER | Age: 68
DRG: 304 | End: 2018-12-23
Attending: HOSPITALIST
Payer: MEDICARE

## 2018-12-23 ENCOUNTER — APPOINTMENT (OUTPATIENT)
Dept: CARDIOLOGY | Facility: MEDICAL CENTER | Age: 68
DRG: 304 | End: 2018-12-23
Attending: HOSPITALIST
Payer: MEDICARE

## 2018-12-23 PROBLEM — K81.9 CHOLECYSTITIS WITHOUT CHOLELITHIASIS: Status: ACTIVE | Noted: 2018-12-23

## 2018-12-23 LAB
CHOLEST SERPL-MCNC: 91 MG/DL (ref 100–199)
GLUCOSE BLD-MCNC: 151 MG/DL (ref 65–99)
HAV IGM SERPL QL IA: NEGATIVE
HBV CORE IGM SER QL: NEGATIVE
HBV SURFACE AB SERPL IA-ACNC: 39.85 MIU/ML (ref 0–10)
HBV SURFACE AG SER QL: NEGATIVE
HCV AB SER QL: NEGATIVE
HDLC SERPL-MCNC: 30 MG/DL
LDLC SERPL CALC-MCNC: 33 MG/DL
LV EJECT FRACT  99904: 60
LV EJECT FRACT MOD 2C 99903: 55.92
LV EJECT FRACT MOD 4C 99902: 64.83
LV EJECT FRACT MOD BP 99901: 57.15
TRIGL SERPL-MCNC: 139 MG/DL (ref 0–149)
TROPONIN I SERPL-MCNC: 0.03 NG/ML (ref 0–0.04)
TROPONIN I SERPL-MCNC: 0.04 NG/ML (ref 0–0.04)

## 2018-12-23 PROCEDURE — 93306 TTE W/DOPPLER COMPLETE: CPT

## 2018-12-23 PROCEDURE — A9270 NON-COVERED ITEM OR SERVICE: HCPCS | Performed by: HOSPITALIST

## 2018-12-23 PROCEDURE — 700102 HCHG RX REV CODE 250 W/ 637 OVERRIDE(OP): Performed by: HOSPITALIST

## 2018-12-23 PROCEDURE — 700102 HCHG RX REV CODE 250 W/ 637 OVERRIDE(OP): Performed by: NURSE PRACTITIONER

## 2018-12-23 PROCEDURE — 76705 ECHO EXAM OF ABDOMEN: CPT

## 2018-12-23 PROCEDURE — 96375 TX/PRO/DX INJ NEW DRUG ADDON: CPT

## 2018-12-23 PROCEDURE — G8979 MOBILITY GOAL STATUS: HCPCS | Mod: CH

## 2018-12-23 PROCEDURE — 90935 HEMODIALYSIS ONE EVALUATION: CPT | Performed by: INTERNAL MEDICINE

## 2018-12-23 PROCEDURE — G8980 MOBILITY D/C STATUS: HCPCS | Mod: CH

## 2018-12-23 PROCEDURE — 700102 HCHG RX REV CODE 250 W/ 637 OVERRIDE(OP): Performed by: INTERNAL MEDICINE

## 2018-12-23 PROCEDURE — 82962 GLUCOSE BLOOD TEST: CPT

## 2018-12-23 PROCEDURE — G8978 MOBILITY CURRENT STATUS: HCPCS | Mod: CH

## 2018-12-23 PROCEDURE — 80061 LIPID PANEL: CPT

## 2018-12-23 PROCEDURE — 86706 HEP B SURFACE ANTIBODY: CPT

## 2018-12-23 PROCEDURE — 700111 HCHG RX REV CODE 636 W/ 250 OVERRIDE (IP): Performed by: HOSPITALIST

## 2018-12-23 PROCEDURE — 83036 HEMOGLOBIN GLYCOSYLATED A1C: CPT

## 2018-12-23 PROCEDURE — 84484 ASSAY OF TROPONIN QUANT: CPT | Mod: 91

## 2018-12-23 PROCEDURE — 90935 HEMODIALYSIS ONE EVALUATION: CPT

## 2018-12-23 PROCEDURE — G0378 HOSPITAL OBSERVATION PER HR: HCPCS

## 2018-12-23 PROCEDURE — 700111 HCHG RX REV CODE 636 W/ 250 OVERRIDE (IP)

## 2018-12-23 PROCEDURE — 93306 TTE W/DOPPLER COMPLETE: CPT | Mod: 26 | Performed by: INTERNAL MEDICINE

## 2018-12-23 PROCEDURE — A9270 NON-COVERED ITEM OR SERVICE: HCPCS | Performed by: INTERNAL MEDICINE

## 2018-12-23 PROCEDURE — A9270 NON-COVERED ITEM OR SERVICE: HCPCS | Performed by: NURSE PRACTITIONER

## 2018-12-23 PROCEDURE — 96376 TX/PRO/DX INJ SAME DRUG ADON: CPT

## 2018-12-23 PROCEDURE — 97161 PT EVAL LOW COMPLEX 20 MIN: CPT

## 2018-12-23 PROCEDURE — 99226 PR SUBSEQUENT OBSERVATION CARE,LEVEL III: CPT | Performed by: INTERNAL MEDICINE

## 2018-12-23 PROCEDURE — 5A1D70Z PERFORMANCE OF URINARY FILTRATION, INTERMITTENT, LESS THAN 6 HOURS PER DAY: ICD-10-PCS | Performed by: INTERNAL MEDICINE

## 2018-12-23 PROCEDURE — 36415 COLL VENOUS BLD VENIPUNCTURE: CPT

## 2018-12-23 RX ORDER — HEPARIN SODIUM 1000 [USP'U]/ML
INJECTION, SOLUTION INTRAVENOUS; SUBCUTANEOUS
Status: COMPLETED
Start: 2018-12-23 | End: 2018-12-23

## 2018-12-23 RX ORDER — HEPARIN SODIUM 1000 [USP'U]/ML
3000 INJECTION, SOLUTION INTRAVENOUS; SUBCUTANEOUS
Status: DISCONTINUED | OUTPATIENT
Start: 2018-12-23 | End: 2018-12-25 | Stop reason: HOSPADM

## 2018-12-23 RX ORDER — AMLODIPINE BESYLATE 5 MG/1
5 TABLET ORAL
Status: DISCONTINUED | OUTPATIENT
Start: 2018-12-23 | End: 2018-12-24

## 2018-12-23 RX ORDER — DEXTROSE MONOHYDRATE 25 G/50ML
25 INJECTION, SOLUTION INTRAVENOUS
Status: DISCONTINUED | OUTPATIENT
Start: 2018-12-23 | End: 2018-12-23

## 2018-12-23 RX ORDER — LISINOPRIL 20 MG/1
40 TABLET ORAL DAILY
Status: DISCONTINUED | OUTPATIENT
Start: 2018-12-24 | End: 2018-12-25 | Stop reason: HOSPADM

## 2018-12-23 RX ORDER — ONDANSETRON 2 MG/ML
4 INJECTION INTRAMUSCULAR; INTRAVENOUS EVERY 4 HOURS PRN
Status: DISCONTINUED | OUTPATIENT
Start: 2018-12-23 | End: 2018-12-25 | Stop reason: HOSPADM

## 2018-12-23 RX ORDER — DEXTROSE MONOHYDRATE 25 G/50ML
25 INJECTION, SOLUTION INTRAVENOUS
Status: DISCONTINUED | OUTPATIENT
Start: 2018-12-23 | End: 2018-12-25 | Stop reason: HOSPADM

## 2018-12-23 RX ORDER — CALCIUM CARBONATE 500 MG/1
500 TABLET, CHEWABLE ORAL EVERY 12 HOURS PRN
Status: DISCONTINUED | OUTPATIENT
Start: 2018-12-23 | End: 2018-12-25 | Stop reason: HOSPADM

## 2018-12-23 RX ORDER — LISINOPRIL 20 MG/1
20 TABLET ORAL ONCE
Status: COMPLETED | OUTPATIENT
Start: 2018-12-23 | End: 2018-12-23

## 2018-12-23 RX ADMIN — HYDRALAZINE HYDROCHLORIDE 10 MG: 20 INJECTION INTRAMUSCULAR; INTRAVENOUS at 00:53

## 2018-12-23 RX ADMIN — AMLODIPINE BESYLATE 5 MG: 5 TABLET ORAL at 14:16

## 2018-12-23 RX ADMIN — ONDANSETRON 4 MG: 2 INJECTION INTRAMUSCULAR; INTRAVENOUS at 01:14

## 2018-12-23 RX ADMIN — LISINOPRIL 20 MG: 20 TABLET ORAL at 05:33

## 2018-12-23 RX ADMIN — HYDRALAZINE HYDROCHLORIDE 10 MG: 20 INJECTION INTRAMUSCULAR; INTRAVENOUS at 05:42

## 2018-12-23 RX ADMIN — STANDARDIZED SENNA CONCENTRATE AND DOCUSATE SODIUM 2 TABLET: 8.6; 5 TABLET, FILM COATED ORAL at 05:33

## 2018-12-23 RX ADMIN — HEPARIN SODIUM 3000 UNITS: 1000 INJECTION, SOLUTION INTRAVENOUS; SUBCUTANEOUS at 10:40

## 2018-12-23 RX ADMIN — HYDRALAZINE HYDROCHLORIDE 10 MG: 20 INJECTION INTRAMUSCULAR; INTRAVENOUS at 11:44

## 2018-12-23 RX ADMIN — SEVELAMER CARBONATE 2400 MG: 800 TABLET, FILM COATED ORAL at 18:22

## 2018-12-23 RX ADMIN — SEVELAMER CARBONATE 2400 MG: 800 TABLET, FILM COATED ORAL at 14:17

## 2018-12-23 RX ADMIN — HYDRALAZINE HYDROCHLORIDE 10 MG: 20 INJECTION INTRAMUSCULAR; INTRAVENOUS at 06:43

## 2018-12-23 RX ADMIN — LISINOPRIL 20 MG: 20 TABLET ORAL at 08:18

## 2018-12-23 RX ADMIN — Medication 81 MG: at 05:33

## 2018-12-23 RX ADMIN — SEVELAMER CARBONATE 2400 MG: 800 TABLET, FILM COATED ORAL at 05:33

## 2018-12-23 RX ADMIN — ANTACID TABLETS 500 MG: 500 TABLET, CHEWABLE ORAL at 01:29

## 2018-12-23 RX ADMIN — METOPROLOL TARTRATE 100 MG: 50 TABLET ORAL at 22:26

## 2018-12-23 RX ADMIN — FUROSEMIDE 40 MG: 40 TABLET ORAL at 05:33

## 2018-12-23 ASSESSMENT — ENCOUNTER SYMPTOMS
HEARTBURN: 0
FEVER: 0
ABDOMINAL PAIN: 1
BLOOD IN STOOL: 0
VOMITING: 1
HEADACHES: 1
NAUSEA: 1
DIZZINESS: 1
CHILLS: 0
COUGH: 0
CONSTIPATION: 0
DIAPHORESIS: 0
DIARRHEA: 0

## 2018-12-23 ASSESSMENT — PAIN SCALES - GENERAL
PAINLEVEL_OUTOF10: 4
PAINLEVEL_OUTOF10: 2
PAINLEVEL_OUTOF10: 0

## 2018-12-23 ASSESSMENT — COGNITIVE AND FUNCTIONAL STATUS - GENERAL
MOBILITY SCORE: 24
SUGGESTED CMS G CODE MODIFIER MOBILITY: CH

## 2018-12-23 ASSESSMENT — PATIENT HEALTH QUESTIONNAIRE - PHQ9
1. LITTLE INTEREST OR PLEASURE IN DOING THINGS: NOT AT ALL
2. FEELING DOWN, DEPRESSED, IRRITABLE, OR HOPELESS: NOT AT ALL
SUM OF ALL RESPONSES TO PHQ9 QUESTIONS 1 AND 2: 0
SUM OF ALL RESPONSES TO PHQ9 QUESTIONS 1 AND 2: 0
1. LITTLE INTEREST OR PLEASURE IN DOING THINGS: NOT AT ALL
2. FEELING DOWN, DEPRESSED, IRRITABLE, OR HOPELESS: NOT AT ALL

## 2018-12-23 ASSESSMENT — GAIT ASSESSMENTS
DISTANCE (FEET): 200
GAIT LEVEL OF ASSIST: SUPERVISED

## 2018-12-23 ASSESSMENT — LIFESTYLE VARIABLES
EVER_SMOKED: NEVER
ALCOHOL_USE: NO

## 2018-12-23 NOTE — PROGRESS NOTES
3hr HD started @ 1154 and completed @ 1425,net UF = 2800 ml only due to leg cramps,no futher c/o after reducing UF and 100 ns bolus given.LUAVF + B/T,cannulation sites covered with DD,CDI,bruising noted on upper portion fof AVF most likely from previous cannulation.

## 2018-12-23 NOTE — H&P
Hospital Medicine History & Physical Note    Date of Service  12/22/2018    Primary Care Physician  DALE Young.    Consultants  Nephrology, pending    Code Status  Full    Chief Complaint  Chief Complaint   Patient presents with   • Epigastric Pain     Pt reports epigastric pain for 3 mos/ sent by MD for further eval. pt denies CP/nausea/diarrhea or pain radiating to back       History of Presenting Illness  68 y.o. female who presented on 12/22/2018 with abdominal pain and dizziness.  The patient is a primary Serbian speaker and prefers to have translation conducted by her son who is at bedside and speaks English.  In short, the patient carries a known history of coronary artery disease, diabetes mellitus, and end-stage renal disease on hemodialysis Tuesday, Thursday, and Saturdays..  She states that she has had no somatic complaints until today when she developed dizziness and epigastric discomfort.  She does however confirm that she has had this epigastric discomfort intermittently for the last several months and was recently started on a proton pump inhibitor.  She does not check her blood pressure at home but today reportedly while at her primary care provider's office, she was reportedly found to have significantly elevated blood pressures and was referred to the emergency room for further evaluation..  Upon assessment here, the patient does have hypertensive urgency with systolic blood pressures in the 200s.  She denies any fevers, chills, recent viral illnesses, chest pain, shortness of breath, diarrhea or dysuria.  Her son reports that she has been missing multiple medications because they do not get filled automatically at the pharmacy.  Therefore she has not been taking Lipitor, Plavix, or her Norvasc.      Review of Systems  Review of Systems   Constitutional: Negative for chills and fever.   HENT: Negative for congestion and sore throat.    Eyes: Negative for photophobia.   Respiratory:  Negative for cough, shortness of breath and wheezing.    Cardiovascular: Negative for chest pain and palpitations.   Gastrointestinal: Positive for abdominal pain. Negative for diarrhea, nausea and vomiting.   Genitourinary: Negative for dysuria.   Musculoskeletal: Negative for myalgias.   Skin: Negative.    Neurological: Positive for dizziness. Negative for tingling, focal weakness and headaches.   Psychiatric/Behavioral: Negative for depression and suicidal ideas.       Past Medical History  Past Medical History:   Diagnosis Date   • Abnormal cardiovascular stress test    • Blood clotting disorder ([facility])     with AVF   • CAD (coronary artery disease)    • Dental disorder     partial dentures- uppers   • Diabetes (HCC)     oral medication   • Dialysis patient ([facility])     [facility]   • High cholesterol    • Hyperlipidemia    • Hypertension    • Kidney disease     renal failure , on dialysis, M, W, F.   • Kidney transplant candidate        Surgical History  Past Surgical History:   Procedure Laterality Date   • CARDIAC CATH  9/7/2016    RCA stented with 2 Synergy drug-eluting stents.   • RECOVERY  8/16/2016    Procedure: CATH LAB [facility] WITH POSSIBLE DR. CASTILLO;  Surgeon: [facility];  Location: SURGERY PRE-POST PROC UNIT [facility];  Service:    • CARDIAC CATH  8/16/16    100% RCA   • OTHER Left 2014    left arm upper extremity fistula   • OTHER ABDOMINAL SURGERY      left kidney removed due to cancer       Family History  Family History   Problem Relation Age of Onset   • Diabetes Sister    • Other Sister         liver disease   • Diabetes Brother    • Heart Disease Neg Hx        Social History  Social History   Substance Use Topics   • Smoking status: Never Smoker   • Smokeless tobacco: Never Used   • Alcohol use No       Allergies  No Known Allergies    Medications  No current facility-administered medications on file prior to encounter.      Current Outpatient Prescriptions on File Prior to  Encounter   Medication Sig Dispense Refill   • furosemide (LASIX) 40 MG Tab TAKE 1 TABLET BY MOUTH DAILY 90 Tab 3   • lisinopril (PRINIVIL) 20 MG Tab TAKE 1 TAB BY MOUTH EVERY DAY. 90 Tab 0   • lisinopril (PRINIVIL) 20 MG Tab Take 1 Tab by mouth every day. Needs to be seen for further refills. Thank you 30 Tab 0   • omeprazole (PRILOSEC) 20 MG delayed-release capsule Take 1 Cap by mouth every day. 30 Cap 2   • hydrOXYzine HCl (ATARAX) 25 MG Tab TAKE 1 TABLET BY MOUTH EVERY DAY AT BEDTIME AS NEEDED 90 Tab 0   • [facility] ASPIRIN ADULT LOW DOSE 81 MG Chew Tab chewable tablet TAKE 1 TAB BY MOUTH EVERY DAY. 90 Tab 1   • minoxidil (LONITEN) 2.5 MG Tab Take 1 Tab by mouth every day. 30 Tab 11   • ROPINIRole (REQUIP) 0.25 MG Tab TAKE 1 TABLET BY MOUTH AT BEDTIME FOR RESTLESS LEG SYNDROME 90 Tab 3   • metoprolol (LOPRESSOR) 100 MG Tab Take 1 Tab by mouth 2 times a day. 60 Tab 11   • furosemide (LASIX) 40 MG Tab TAKE 1 TABLET BY MOUTH DAILY 90 Tab 3   • clopidogrel (PLAVIX) 75 MG Tab Take 1 Tab by mouth every day. (Patient not taking: Reported on 2018) 30 Tab 11   • Multiple Vitamin (MULTI-VITAMIN DAILY PO) Take  by mouth.     • atorvastatin (LIPITOR) 20 MG Tab Take 20 mg by mouth every evening.     • amlodipine (NORVASC) 10 MG Tab Take 1 Tab by mouth every day. (Patient not taking: Reported on 2018) 90 Tab 3   • Sevelamer Carbonate 800 MG Tab Take 2,400 mg by mouth 3 times a day.     • glipiZIDE (GLUCOTROL) 5 MG Tab Take 1 Tab by mouth every day. (Patient not taking: Reported on 2018) 90 Tab 4       Physical Exam  Hemodynamics  Temp (24hrs), Av °C (96.8 °F), Min:36 °C (96.8 °F), Max:36 °C (96.8 °F)   Temperature: 36 °C (96.8 °F)  Pulse  Av.8  Min: 55  Max: 69 Heart Rate (Monitored): (!) 56  Blood Pressure : (!) 211/86, NIBP: (!) 203/78      Respiratory      Respiration: 15, Pulse Oximetry: 97 %             Physical Exam   Constitutional: She is oriented to person, place, and time. No distress.   HENT:    Head: Normocephalic and atraumatic.   Right Ear: External ear normal.   Left Ear: External ear normal.   Eyes: EOM are normal. Right eye exhibits no discharge. Left eye exhibits no discharge.   Neck: Neck supple. No JVD present.   Cardiovascular: Normal rate, regular rhythm and normal heart sounds.    Pulmonary/Chest: Effort normal and breath sounds normal. No respiratory distress. She exhibits no tenderness.   Abdominal: Soft. Bowel sounds are normal. She exhibits no distension. There is no tenderness.   Musculoskeletal: Normal range of motion. She exhibits no edema.   Neurological: She is alert and oriented to person, place, and time. No cranial nerve deficit.   Skin: Skin is warm and dry. She is not diaphoretic. No erythema.   Psychiatric: She has a normal mood and affect. Her behavior is normal.   Nursing note and vitals reviewed.    Capillary refill less than 3 seconds, distal pulses intact    Laboratory:  Recent Labs      12/22/18 1921   WBC  3.0*   RBC  3.42*   HEMOGLOBIN  10.2*   HEMATOCRIT  31.2*   MCV  91.2   MCH  29.8   MCHC  32.7*   RDW  46.5   PLATELETCT  126*   MPV  13.3*     Recent Labs      12/22/18 1921   SODIUM  138   POTASSIUM  4.3   CHLORIDE  93*   CO2  34*   GLUCOSE  188*   BUN  28*   CREATININE  6.16*   CALCIUM  10.1     Recent Labs      12/22/18 1921   ALTSGPT  46   ASTSGOT  51*   ALKPHOSPHAT  219*   TBILIRUBIN  0.6   LIPASE  48   GLUCOSE  188*                 Lab Results   Component Value Date    TROPONINI 0.04 12/22/2018       Imaging  No results found.      Assessment/Plan:  Anticipate that patient will need less than 2 midnights for management of the discussed medical issues.    * Hypertensive urgency   Assessment & Plan    Patient's blood pressures have reportedly been labile at dialysis, she vacillates anywhere from the 190s to the 200s.  She is currently sustaining systolic blood pressures greater than 200.  I believe this may be secondary to missed medications.  Per her son,  she is missing multiple medications as not all of them automatically refill at the pharmacy.  I am going to resume her home Prinivil but increased dose.  I will restart home Lasix, metoprolol, and add as needed IV hydralazine for breakthrough blood pressures.  We will be judicious in the use of IV antihypertensives and plan for a slow decrease in blood pressure.  We will continue to monitor on telemetry, trend troponin levels, check TSH, and an echocardiogram.  The patient is due for dialysis tomorrow and I do believe that she will have some improvement in her blood pressure after this as well.     Transaminitis   Assessment & Plan    Patient has had intermittent abnormal liver function tests in the back, currently she has an elevated AST and alk phos.  Given her reports of abdominal pain, I will check a liver and biliary tree ultrasound for completeness although these labs may be confounded by her poor renal clearance.     ESRD (end stage renal disease) ([facility])- (present on admission)   Assessment & Plan    We will plan to consult nephrology in the morning as she will be due for her dialysis tomorrow, she has been followed in the past by Dr. Burnett.     S/P coronary artery stent placement- (present on admission)   Assessment & Plan    Patient reports that she is not taking her Lipitor and Plavix, I am unclear why.  I will continue her home aspirin and check a lipid panel.  If this is positive, then she will need to be started on a statin.     Type 2 diabetes mellitus (HCC)- (present on admission)   Assessment & Plan    Monitor with Accu-Cheks and cover with insulin sliding scale.         Prophylaxis: Sequential compression devices for DVT prophylaxis, home PPI indicated, bowel protocol as needed

## 2018-12-23 NOTE — ED NOTES
Med Rec Updated and Complete per family at bedside and RX bottles (returned)  Allergies Reviewed  No PO ABX last 30 days    Per family Pt not on Plavix at this time.    Per family Pt is taking a full tablet of Metoprolol twice daily even though RX bottle instructs the Pt to take half a tablet twice daily.

## 2018-12-23 NOTE — RESPIRATORY CARE
COPD EDUCATION by COPD CLINICAL EDUCATOR  12/23/2018 at 7:27 AM by Savita Clement     Patient reviewed by COPD education team. Patient does not qualify for COPD program.

## 2018-12-23 NOTE — PROGRESS NOTES
vomitted previously ingested food about 15 minutes ago, resting right now, to continue to monitor.

## 2018-12-23 NOTE — ED NOTES
"Pt ambulate to room, sent by MD for further evaluation of her \"pressure.\" Denies any cardiac history, has been feeling very tired. Pt had dialysis yesterday.  "

## 2018-12-23 NOTE — PROGRESS NOTES
Transported  from [facility]  pod, aox4, sb hr 54 on monitor, unsteady on her  feet, Sinhala speaking only. Denies pain or sob. Call light within reach. Needs attended. Plan of care discussed and understood.

## 2018-12-23 NOTE — ASSESSMENT & PLAN NOTE
Poor compliance with outpatient prescriptions reported  Resumed home meds with increased dose of lisinopril 12/23  Reduced BB 12/24 due to bradycardia  Incre CCB 12/24  Continue Lasix, metoprolol, IV hydralazine  Echo ok  Nephrology consult-- HD 12/23/2018 and 12/24.   TSH euthyroid  Tropes negative x3

## 2018-12-23 NOTE — ASSESSMENT & PLAN NOTE
Cont ASA for now  Cont BB, ACEI  Non-compliant w Lipitor and Plavix  Lipid panel shows good control - hold statin for now given AST/Alk phos

## 2018-12-23 NOTE — PROGRESS NOTES
Subjective:      Tere Solorzano is a 68 y.o. female who presents with Blood Pressure Problem (x 1 week ) and Dizziness    Past Medical History:   Diagnosis Date   • Abnormal cardiovascular stress test    • Blood clotting disorder (HCC)     with AVF   • CAD (coronary artery disease)    • Dental disorder     partial dentures- uppers   • Diabetes (HCC)     oral medication   • Dialysis patient (HCC)     [facility]   • High cholesterol    • Hyperlipidemia    • Hypertension    • Kidney disease     renal failure , on dialysis, M, W, F.   • Kidney transplant candidate      Social History     Social History   • Marital status:      Spouse name: N/A   • Number of children: N/A   • Years of education: N/A     Occupational History   • Not on file.     Social History Main Topics   • Smoking status: Never Smoker   • Smokeless tobacco: Never Used   • Alcohol use No   • Drug use: No   • Sexual activity: No     Other Topics Concern   • Not on file     Social History Narrative   • No narrative on file     Family History   Problem Relation Age of Onset   • Diabetes Sister    • Other Sister         liver disease   • Diabetes Brother    • Heart Disease Neg Hx        Allergies: Patient has no known allergies.    patient is a 68-year-old female with long-standing history of diabetes, and end-stage renal disease, on hemodialysis 3 times weekly.  She has a history of hypertension and currently takes Lasix and lisinopril.  Patient states that at each of her dialysis appointments in the last week she was advised that her blood pressure was elevated though she does not recall what she was told that it was.  She states earlier today she broke out in a cold sweat and became very lightheaded.  States that she has been feeling generally weak and fatigued since.  She denies chest pain or shortness of breath.  She has been nauseated.    Patient was checked in with a blood pressure of 204/68; I rechecked this manually  to the right upper extremity and obtained a blood pressure of 220/100.           Dizziness    This is a new problem. The current episode started in the past 7 days. The problem occurs intermittently. The problem has been waxing and waning. Associated symptoms include diaphoresis, nausea and weakness. Pertinent negatives include no chest pain. Associated symptoms comments: Light headedness, hypertension, weakness, fatigue.  Nothing aggravates the symptoms.       Review of Systems   Constitutional: Positive for diaphoresis and malaise/fatigue.   HENT: Negative.    Eyes: Negative.    Respiratory: Negative.    Cardiovascular: Negative for chest pain.   Gastrointestinal: Positive for nausea.   Genitourinary: Negative.    Musculoskeletal: Negative.    Skin: Negative.    Neurological: Positive for dizziness and weakness.        Light headedness   All other systems reviewed and are negative.         Objective:     BP (!) 204/68   Pulse (!) 54   Temp 36 °C (96.8 °F)   Ht 1.524 m (5')   Wt 58.1 kg (128 lb)   LMP  (LMP Unknown)   SpO2 97%   BMI 25.00 kg/m²       Physical Exam   Constitutional: She is oriented to person, place, and time.   HENT:   Head: Normocephalic.   Right Ear: External ear normal.   Left Ear: External ear normal.   Nose: Nose normal.   Mouth/Throat: Oropharynx is clear and moist. No oropharyngeal exudate.   Eyes: Pupils are equal, round, and reactive to light. EOM are normal.   Neck: Normal range of motion.   Cardiovascular: Normal rate.    Pulmonary/Chest: Effort normal and breath sounds normal.   Neurological: She is alert and oriented to person, place, and time.   Skin: Skin is warm and dry. She is not diaphoretic.   Psychiatric: She has a normal mood and affect. Her behavior is normal.     EKG: sinus rhythm; no acute ST elevation or depression. No ischemic changes.     Discussed case with Dr. Yoon at [facility] and she agreed to accept transfer of the patient.           Assessment/Plan:   Hypertension  Light headedness  Weakness     -patient transferred to [facility] ED via POV for further evaluation.   There are no diagnoses linked to this encounter.

## 2018-12-23 NOTE — ED TRIAGE NOTES
Chief Complaint   Patient presents with   • Epigastric Pain     Pt reports epigastric pain for 3 mos/ sent by MD for further eval. pt denies CP/nausea/diarrhea or pain radiating to back     Explained to pt triage process, made pt aware to tell this RN/staff of any changes/concerns, pt verbalized understanding of process and instructions given. Pt to ER lobby.

## 2018-12-23 NOTE — PROGRESS NOTES
[facility] Medicine Daily Progress Note    Date of Service  12/23/2018    Chief Complaint  68 y.o. female admitted 12/22/2018 with hypertensive urgency, epigastric pain and dizziness.  Patient has end-stage renal failure and receives dialysis Tuesday Thursday Saturday through [facility].    Hospital Course    Patient admitted for hypertensive urgency, epigastric pain, nausea vomiting and dizziness.  She has been compliant with her outpatient hemodialysis schedule secondary to end-stage renal failure and history of left-sided nephrectomy.  She has history of coronary artery disease and underwent PCI in 2016 of the RCA with NOEMI x2.  Ultrasound right upper quadrant shows gallbladder wall thickening and trace pericholecystic fluid.  HIDA scan ordered and pending.  Lab work shows ESRD with leukopenia at 3.3.  Alk phosphatase elevated at 219.      Interval Problem Update  Epigastric abdominal pain-- present for months; worsening; associated with nausea, vomiting, dizziness, and weight loss, although she cannot definitively state the timeframe or amount.  Patient endorses night sweats.  Ultrasound shows the above pathology; general surgery consulted; HIDA ordered and pending; consider further expansion if workup is needed  Hypertensive urgency-- refractory to increased doses of lisinopril today; start Norvasc; HD today; follow closely and add Vasotec if needed  ESRD--well-known to neph; consult and HD planned 12/23/2018; continue OP tx  CAD--continue 81 ASA for now; Lasix; Lopressor; troponins negative x3; restart Plavix as able  Diabetes--Add low dose correctional scale; follow results    Consultants/Specialty  Nephrology  General    Code Status  Full    Disposition  TBD    Review of Systems  Review of Systems   Constitutional: Negative for chills, diaphoresis and fever.   Respiratory: Negative for cough.    Cardiovascular: Negative for chest pain.   Gastrointestinal: Positive for abdominal pain, nausea and  vomiting. Negative for blood in stool, constipation, diarrhea, heartburn and melena.   Genitourinary: Negative for dysuria.   Neurological: Positive for dizziness and headaches.   All other systems reviewed and are negative.       Physical Exam  Temp:  [36 °C (96.8 °F)-36.7 °C (98.1 °F)] 36.7 °C (98.1 °F)  Pulse:  [54-69] 61  Resp:  [15-24] 20  BP: (168-228)/() 197/85    Physical Exam   Constitutional: She is oriented to person, place, and time. She appears well-developed and well-nourished. No distress.   HENT:   Head: Normocephalic and atraumatic.   Eyes: Pupils are equal, round, and reactive to light. EOM are normal.   Neck: Neck supple.   Cardiovascular: Normal rate, regular rhythm and normal heart sounds.    Pulmonary/Chest: Effort normal and breath sounds normal. No respiratory distress. She has no wheezes. She has no rales.   Abdominal: Soft. Normal appearance and bowel sounds are normal. She exhibits no distension. There is generalized tenderness.   Mild   Neurological: She is alert and oriented to person, place, and time. No cranial nerve deficit.   Skin: Skin is warm and dry.   Nursing note and vitals reviewed.      Fluids  No intake or output data in the 24 hours ending 12/23/18 1035    Laboratory  Recent Labs      12/22/18   1921   WBC  3.0*   RBC  3.42*   HEMOGLOBIN  10.2*   HEMATOCRIT  31.2*   MCV  91.2   MCH  29.8   MCHC  32.7*   RDW  46.5   PLATELETCT  126*   MPV  13.3*     Recent Labs      12/22/18   1921   SODIUM  138   POTASSIUM  4.3   CHLORIDE  93*   CO2  34*   GLUCOSE  188*   BUN  28*   CREATININE  6.16*   CALCIUM  10.1             Recent Labs      12/23/18   0101   TRIGLYCERIDE  139   HDL  30*   LDL  33       Imaging  EC-ECHOCARDIOGRAM COMPLETE W/O CONT         US-RUQ   Final Result      1. No gallstone is seen. However there is gallbladder wall thickening and trace pericholecystic fluid. The finding is concerning for cholecystitis. HIDA scan could be helpful for confirmation.      2.  Small right pleural effusion.      3. Nonspecific mildly prominent IVC and hepatic veins. This can sometimes be seen with heart failure.         NM-BILIARY (HIDA) SCAN WITH CCK    (Results Pending)        Assessment/Plan  * Hypertensive urgency   Assessment & Plan    Poor compliance with outpatient prescriptions reported  Resumed home meds with increased dose of lisinopril  Continue Lasix, metoprolol, IV hydralazine  12/23/2018 add Norvasc  Echo pending  Nephrology consult-- HD 12/23/2018; normally dialyzed Tuesday, Thursday, and Saturday  TSH euthyroid  Tropes negative x3     Possible cholecystitis without cholelithiasis- (present on admission)   Assessment & Plan    Ultrasound 12/23/2018 shows gallbladder wall thickening with trace pericholecystic fluid  Alk phosphatase elevated at 219  General surgery consult  HIDA scan ordered and pending  Has been taking outpatient PPI without improvement     Transaminitis   Assessment & Plan    See above; follow HIDA scan results and GS recommendations     ESRD (end stage renal disease) (HCC)- (present on admission)   Assessment & Plan    We will plan to consult nephrology in the morning as she will be due for her dialysis tomorrow, she has been followed in the past by Dr. Burnett.     S/P coronary artery stent placement- (present on admission)   Assessment & Plan    Cont ASA for now  Cont BB, ACEI  Non-compliant w Lipitor and Plavix  Lipid panel shows good control - hold statin for now given AST/Alk phos     Type 2 diabetes mellitus (HCC)- (present on admission)   Assessment & Plan    Monitor with Accu-Cheks and cover with insulin sliding scale.          VTE prophylaxis: SCDs

## 2018-12-23 NOTE — ED PROVIDER NOTES
ED Provider Note    CHIEF COMPLAINT  Chief Complaint   Patient presents with   • Epigastric Pain     Pt reports epigastric pain for 3 mos/ sent by MD for further eval. pt denies CP/nausea/diarrhea or pain radiating to back       HPI  Tere Solorzano is a 68 y.o. female who presents to the emergency department for 2 complaints.  The first being that she had this epigastric discomfort for 3 months on and off and states she is been taking omeprazole for the last month and is been helping with her symptoms.  The second is that she has had some very elevated blood pressure over the last week with elevated blood pressure episodes been associated with some dizziness and just general fatigue.  She is an end-stage renal patient and her dialysis is Tuesday Thursday Saturday however she is actually scheduled for dialysis tomorrow.  She states she has been compliant with her medications.  She denies any associated chest pain nausea vomiting difficulty breathing or back pain associated with any of the symptoms.  Any new weakness or numbness either.    Patient was seen in her primary care earlier today with her blood pressure with systolic over 200 and currently she states she has no complaints she is feeling pretty well    REVIEW OF SYSTEMS  Positives as above. Pertinent negatives include chest pain shortness of breath unilateral weakness or numbness back pain  All other review of systems are negative    PAST MEDICAL HISTORY   has a past medical history of Abnormal cardiovascular stress test; Blood clotting disorder (HCC); CAD (coronary artery disease); Dental disorder; Diabetes (HCC); Dialysis patient (HCC); High cholesterol; Hyperlipidemia; Hypertension; Kidney disease; and Kidney transplant candidate.    SOCIAL HISTORY  Social History     Social History Main Topics   • Smoking status: Never Smoker   • Smokeless tobacco: Never Used   • Alcohol use No   • Drug use: No   • Sexual activity: No       SURGICAL HISTORY    has a past surgical history that includes recovery (8/16/2016); other abdominal surgery; other (Left, 2014); cardiac cath (8/16/16); and cardiac cath (9/7/2016).    CURRENT MEDICATIONS  Home Medications    **Home medications have not yet been reviewed for this encounter**         ALLERGIES  No Known Allergies    PHYSICAL EXAM  VITAL SIGNS: BP (!) 211/86   Pulse 69   Temp 36 °C (96.8 °F) (Temporal)   Resp 16   Ht 1.524 m (5')   Wt 58.1 kg (128 lb)   LMP  (LMP Unknown)   SpO2 92%   BMI 25.00 kg/m²    Pulse ox interpretation: I interpret this pulse ox as normal.  Constitutional: Alert in no apparent distress.  HENT: Normocephalic atraumatic, MMM  Eyes: PER, Conjunctiva normal, Non-icteric.   Neck: Normal range of motion, No tenderness, Supple, No stridor.   Cardiovascular: Regular rate and rhythm, no murmurs.  Bruit and thrill felt to her fistula in the left upper extremity  Thorax & Lungs: Normal breath sounds, No respiratory distress, No wheezing, No chest tenderness.   Abdomen: Bowel sounds normal, Soft, No tenderness, No pulsatile masses. No peritoneal signs.  Skin: Warm, Dry, No erythema, No rash.   Back: No bony tenderness, No CVA tenderness.   Extremities: Intact distal pulses, No edema, No tenderness, No cyanosis  Neurologic: Alert and oriented x3, No focal deficits noted.       DIFFERENTIAL DIAGNOSIS AND WORK UP PLAN    This is a 68 y.o. female who presents with possible hypertensive urgency versus electrolyte abnormality or unstable angina.  The epigastric pain I feel like is a red herring more related to gastritis that she is been taking omeprazole and having increased improvement in her symptoms.  She however has had some dizziness with elevated blood pressures that are uncontrolled despite her medications at home.  She does not appear fluid overloaded will evaluate with laboratory analysis EKG    DIAGNOSTIC STUDIES / PROCEDURES    EKG  12- Lead EKG; interpreted by myself - Estrada  Normal sinus  rhythm with a rate of 64 bpm.   Normal axis.  LVH  No ST elevation or depression  t wave inversion v5-6  No widening of QRS complex   Good R wave progression .   New t wave inversions  Clinical Impression: sinus rhythm w LVH and t waves changes likely 2/2 to LVH v5/6 however new      LABS  Pertinent Lab Findings  CBC with a white blood cell count of 3 hemoglobin of 10 and platelet is 126 unchanged from prior CMP consistent with chronic kidney disease and elevated bicarb, troponin 0.04 which consistent with prior      RADIOLOGY  No orders to display     The radiologist's interpretation of all radiological studies have been reviewed by me.      COURSE & MEDICAL DECISION MAKING  Pertinent Labs & Imaging studies reviewed. (See chart for details)    8:57 PM  I discussed with the patient at the bedside her laboratory findings and we discussed the possible plan as she states she still is feeling a little dizzy with the elevated blood pressures and fatigued concern for hypertensive urgency so she will be admitted for observation and dialysis in the morning and management of her medications  Nephrology is [facility]     9:18 PM  Spoke w Dr Carolina for admission and she has accepted the patient     DISPOSITION:  Patient will be admitted to Dr Carolina in guarded condition.      FINAL IMPRESSION  1. Hypertensive urgency  2. ESRD on HD  3. dizziness        Electronically signed by: Vicki Dorado, 12/22/2018 7:44 PM    This dictation has been created using voice recognition software and/or scribes. The accuracy of the dictation is limited by the abilities of the software and the expertise of the scribes. I expect there may be some errors of grammar and possibly content. I made every attempt to manually correct the errors within my dictation. However, errors related to voice recognition software and/or scribes may still exist and should be interpreted within the appropriate context.

## 2018-12-23 NOTE — ASSESSMENT & PLAN NOTE
Ultrasound 12/23/2018 shows gallbladder wall thickening with trace pericholecystic fluid  Alk phosphatase elevated at 219  General surgery consult  HIDA scan ordered and pending  Has been taking outpatient PPI without improvement

## 2018-12-23 NOTE — CONSULTS
Consults   Nephrology Inpatient Consultation    Date of Service  12/23/2018    Reason for Consultation  ESRD, volume overload    History of Presenting Illness  68 y.o. female admitted 12/22/2018 with hypertension, apparently has been missing medications.  Due for dialysis today.    Referring Physician  Klaus Campa M.D.    Consulting Physician  Aftab Burnett M.D.    Review of Systems  Review of Systems   Constitutional: Negative for chills and fever.      Past Medical History  Past Medical History:   Diagnosis Date   • Abnormal cardiovascular stress test    • Blood clotting disorder (HCC)     with AVF   • CAD (coronary artery disease)    • Dental disorder     partial dentures- uppers   • Diabetes (HCC)     oral medication   • Dialysis patient (HCC)     [facility]   • High cholesterol    • Hyperlipidemia    • Hypertension    • Kidney disease     renal failure , on dialysis, M, W, F.   • Kidney transplant candidate        Surgical History  Past Surgical History:   Procedure Laterality Date   • CARDIAC CATH  9/7/2016    RCA stented with 2 Synergy drug-eluting stents.   • RECOVERY  8/16/2016    Procedure: CATH LAB [facility] WITH POSSIBLE DR. CASTILLO;  Surgeon: [facility];  Location: SURGERY PRE-POST PROC UNIT [facility];  Service:    • CARDIAC CATH  8/16/16    100% RCA   • OTHER Left 2014    left arm upper extremity fistula   • OTHER ABDOMINAL SURGERY      left kidney removed due to cancer       Medications  No current facility-administered medications on file prior to encounter.      Current Outpatient Prescriptions on File Prior to Encounter   Medication Sig Dispense Refill   • atorvastatin (LIPITOR) 20 MG Tab Take 20 mg by mouth every evening.         Family History  family history includes Diabetes in her brother and sister; Other in her sister.    Social History  Social History   Substance Use Topics   • Smoking status: Never Smoker   • Smokeless tobacco: Never Used   • Alcohol use No        Allergies  No Known Allergies     Physical Exam  Laboratory/Imaging   Hemodynamics  Temp (24hrs), Av.3 °C (97.4 °F), Min:36 °C (96.8 °F), Max:36.7 °C (98.1 °F)   Temperature: 36.7 °C (98.1 °F)  Pulse  Av.7  Min: 54  Max: 69 Heart Rate (Monitored): (!) 56  Blood Pressure : (!) 181/79, NIBP: (!) 205/78      Respiratory      Respiration: (!) 22, Pulse Oximetry: 96 %             Fluids       Nutrition  Orders Placed This Encounter   Procedures   • Diet Order Regular     Standing Status:   Standing     Number of Occurrences:   1     Order Specific Question:   Diet:     Answer:   Regular [1]       Physical Exam   Constitutional: She is oriented to person, place, and time. She appears well-developed and well-nourished.   Cardiovascular: Normal rate and regular rhythm.    Pulmonary/Chest: Effort normal and breath sounds normal.   Neurological: She is alert and oriented to person, place, and time.       Recent Labs      18   WBC  3.0*   RBC  3.42*   HEMOGLOBIN  10.2*   HEMATOCRIT  31.2*   MCV  91.2   MCH  29.8   MCHC  32.7*   RDW  46.5   PLATELETCT  126*   MPV  13.3*     Recent Labs      18   192   SODIUM  138   POTASSIUM  4.3   CHLORIDE  93*   CO2  34*   GLUCOSE  188*   BUN  28*   CREATININE  6.16*   CALCIUM  10.1             Recent Labs      18   0101   TRIGLYCERIDE  139   HDL  30*   LDL  33          Assessment/Plan     1.  End-stage renal disease    Seen on dialysis today.  Tolerated the treatment well.  Likely discharge and we will follow-up in the outpatient clinic.

## 2018-12-23 NOTE — PROGRESS NOTES
HIDA scan to be completed tomorrow morning due to patients dialysis today. Informed TRAVIS Phan that patient needs to be NPO and no narcotics at midnight.

## 2018-12-24 ENCOUNTER — PATIENT OUTREACH (OUTPATIENT)
Dept: HEALTH INFORMATION MANAGEMENT | Facility: OTHER | Age: 68
End: 2018-12-24

## 2018-12-24 ENCOUNTER — APPOINTMENT (OUTPATIENT)
Dept: RADIOLOGY | Facility: MEDICAL CENTER | Age: 68
DRG: 304 | End: 2018-12-24
Attending: NURSE PRACTITIONER
Payer: MEDICARE

## 2018-12-24 LAB
EST. AVERAGE GLUCOSE BLD GHB EST-MCNC: 126 MG/DL
GLUCOSE BLD-MCNC: 159 MG/DL (ref 65–99)
HBA1C MFR BLD: 6 % (ref 0–5.6)

## 2018-12-24 PROCEDURE — A9270 NON-COVERED ITEM OR SERVICE: HCPCS | Performed by: INTERNAL MEDICINE

## 2018-12-24 PROCEDURE — A9537 TC99M MEBROFENIN: HCPCS

## 2018-12-24 PROCEDURE — 5A1D70Z PERFORMANCE OF URINARY FILTRATION, INTERMITTENT, LESS THAN 6 HOURS PER DAY: ICD-10-PCS | Performed by: INTERNAL MEDICINE

## 2018-12-24 PROCEDURE — A9270 NON-COVERED ITEM OR SERVICE: HCPCS | Performed by: NURSE PRACTITIONER

## 2018-12-24 PROCEDURE — 99232 SBSQ HOSP IP/OBS MODERATE 35: CPT | Performed by: INTERNAL MEDICINE

## 2018-12-24 PROCEDURE — G8989 SELF CARE D/C STATUS: HCPCS | Mod: CI

## 2018-12-24 PROCEDURE — 700102 HCHG RX REV CODE 250 W/ 637 OVERRIDE(OP): Performed by: INTERNAL MEDICINE

## 2018-12-24 PROCEDURE — 90935 HEMODIALYSIS ONE EVALUATION: CPT | Performed by: INTERNAL MEDICINE

## 2018-12-24 PROCEDURE — A9270 NON-COVERED ITEM OR SERVICE: HCPCS | Performed by: HOSPITALIST

## 2018-12-24 PROCEDURE — 82962 GLUCOSE BLOOD TEST: CPT

## 2018-12-24 PROCEDURE — 90935 HEMODIALYSIS ONE EVALUATION: CPT

## 2018-12-24 PROCEDURE — 770020 HCHG ROOM/CARE - TELE (206)

## 2018-12-24 PROCEDURE — G8988 SELF CARE GOAL STATUS: HCPCS | Mod: CI

## 2018-12-24 PROCEDURE — 700102 HCHG RX REV CODE 250 W/ 637 OVERRIDE(OP): Performed by: HOSPITALIST

## 2018-12-24 PROCEDURE — G8987 SELF CARE CURRENT STATUS: HCPCS | Mod: CI

## 2018-12-24 PROCEDURE — 700111 HCHG RX REV CODE 636 W/ 250 OVERRIDE (IP)

## 2018-12-24 PROCEDURE — 700102 HCHG RX REV CODE 250 W/ 637 OVERRIDE(OP): Performed by: NURSE PRACTITIONER

## 2018-12-24 PROCEDURE — 97165 OT EVAL LOW COMPLEX 30 MIN: CPT

## 2018-12-24 PROCEDURE — G8980 MOBILITY D/C STATUS: HCPCS | Mod: CI

## 2018-12-24 RX ORDER — AMLODIPINE BESYLATE 10 MG/1
10 TABLET ORAL
Status: DISCONTINUED | OUTPATIENT
Start: 2018-12-24 | End: 2018-12-25 | Stop reason: HOSPADM

## 2018-12-24 RX ORDER — HEPARIN SODIUM 1000 [USP'U]/ML
INJECTION, SOLUTION INTRAVENOUS; SUBCUTANEOUS
Status: COMPLETED
Start: 2018-12-24 | End: 2018-12-24

## 2018-12-24 RX ORDER — METOPROLOL TARTRATE 50 MG/1
50 TABLET, FILM COATED ORAL 2 TIMES DAILY
Status: DISCONTINUED | OUTPATIENT
Start: 2018-12-24 | End: 2018-12-25 | Stop reason: HOSPADM

## 2018-12-24 RX ADMIN — HEPARIN SODIUM 3000 UNITS: 1000 INJECTION, SOLUTION INTRAVENOUS; SUBCUTANEOUS at 16:00

## 2018-12-24 RX ADMIN — LISINOPRIL 40 MG: 20 TABLET ORAL at 06:14

## 2018-12-24 RX ADMIN — Medication 81 MG: at 06:14

## 2018-12-24 RX ADMIN — FUROSEMIDE 40 MG: 40 TABLET ORAL at 06:14

## 2018-12-24 RX ADMIN — AMLODIPINE BESYLATE 10 MG: 10 TABLET ORAL at 09:38

## 2018-12-24 RX ADMIN — INSULIN HUMAN 1 UNITS: 100 INJECTION, SOLUTION PARENTERAL at 20:38

## 2018-12-24 RX ADMIN — SEVELAMER CARBONATE 2400 MG: 800 TABLET, FILM COATED ORAL at 06:15

## 2018-12-24 ASSESSMENT — ACTIVITIES OF DAILY LIVING (ADL): TOILETING: INDEPENDENT

## 2018-12-24 ASSESSMENT — COGNITIVE AND FUNCTIONAL STATUS - GENERAL
DAILY ACTIVITIY SCORE: 23
HELP NEEDED FOR BATHING: A LITTLE
SUGGESTED CMS G CODE MODIFIER DAILY ACTIVITY: CI

## 2018-12-24 ASSESSMENT — PAIN SCALES - GENERAL
PAINLEVEL_OUTOF10: 0

## 2018-12-24 ASSESSMENT — ENCOUNTER SYMPTOMS
COUGH: 0
DIZZINESS: 1
ABDOMINAL PAIN: 1
HEARTBURN: 0
CHILLS: 0
CONSTIPATION: 0
DIARRHEA: 0
FEVER: 0
HEADACHES: 1
BLOOD IN STOOL: 0
VOMITING: 0
NAUSEA: 1
DIAPHORESIS: 0

## 2018-12-24 ASSESSMENT — PATIENT HEALTH QUESTIONNAIRE - PHQ9
2. FEELING DOWN, DEPRESSED, IRRITABLE, OR HOPELESS: NOT AT ALL
1. LITTLE INTEREST OR PLEASURE IN DOING THINGS: NOT AT ALL
SUM OF ALL RESPONSES TO PHQ9 QUESTIONS 1 AND 2: 0

## 2018-12-24 NOTE — PROGRESS NOTES
No deficits noted w/ neuro checks. Pt still lethargic but arousable. Michele Unit Clerk assisting w/ translation.

## 2018-12-24 NOTE — PROGRESS NOTES
[facility] Medicine Daily Progress Note    Date of Service  12/24/2018    Chief Complaint  68 y.o. female admitted 12/22/2018 with hypertensive urgency, epigastric pain and dizziness.  Patient has end-stage renal failure and receives dialysis Tuesday Thursday Saturday through [facility].    Hospital Course   Ultrasound right upper quadrant shows gallbladder wall thickening and trace pericholecystic fluid.  HIDA scan ordered and pending.  Lab work shows ESRD with leukopenia at 3.3.  Alk phosphatase elevated at 219. Repeating HD again today. Patient ate, and HIDA delayed until 1330 today.       Interval Problem Update  Epigastric abdominal pain-- HIDA ordered and pending; stable  Hypertensive urgency-- kelby on BB; reduce and incr Norvasc; HD today; follow closely and cont PRN  ESRD--well-known to neph; consult and HD again 12/24  CAD--continue 81 ASA for now; Lasix; Lopressor; troponins negative x3; restart Plavix as able  Diabetes -- refuses POC checks - controlled at home per her report. Check A1C    Consultants/Specialty  Nephrology  General surgery    Code Status  Full    Disposition  TBD    Review of Systems  Review of Systems   Constitutional: Negative for chills, diaphoresis and fever.   Respiratory: Negative for cough.    Cardiovascular: Negative for chest pain.   Gastrointestinal: Positive for abdominal pain and nausea. Negative for blood in stool, constipation, diarrhea, heartburn, melena and vomiting.   Genitourinary: Negative for dysuria.   Neurological: Positive for dizziness and headaches.   All other systems reviewed and are negative.       Physical Exam  Temp:  [36.6 °C (97.8 °F)-36.9 °C (98.5 °F)] 36.8 °C (98.2 °F)  Pulse:  [48-66] 51  Resp:  [16-22] 16  BP: (155-181)/(69-87) 161/69    Physical Exam   Constitutional: She is oriented to person, place, and time. She appears well-developed and well-nourished. No distress.   HENT:   Head: Normocephalic and atraumatic.   Eyes: Pupils are equal,  round, and reactive to light. EOM are normal.   Neck: Neck supple.   Cardiovascular: Normal rate, regular rhythm and normal heart sounds.    Pulmonary/Chest: Effort normal and breath sounds normal. No respiratory distress. She has no wheezes. She has no rales.   Abdominal: Soft. Normal appearance and bowel sounds are normal. She exhibits no distension. There is tenderness in the epigastric area.   Mild   Neurological: She is alert and oriented to person, place, and time. No cranial nerve deficit.   Skin: Skin is warm and dry.   Nursing note and vitals reviewed.      Fluids    Intake/Output Summary (Last 24 hours) at 12/24/18 1222  Last data filed at 12/23/18 1345   Gross per 24 hour   Intake              600 ml   Output             3400 ml   Net            -2800 ml       Laboratory  Recent Labs      12/22/18   1921   WBC  3.0*   RBC  3.42*   HEMOGLOBIN  10.2*   HEMATOCRIT  31.2*   MCV  91.2   MCH  29.8   MCHC  32.7*   RDW  46.5   PLATELETCT  126*   MPV  13.3*     Recent Labs      12/22/18   1921   SODIUM  138   POTASSIUM  4.3   CHLORIDE  93*   CO2  34*   GLUCOSE  188*   BUN  28*   CREATININE  6.16*   CALCIUM  10.1             Recent Labs      12/23/18   0101   TRIGLYCERIDE  139   HDL  30*   LDL  33       Imaging  EC-ECHOCARDIOGRAM COMPLETE W/O CONT   Final Result      US-RUQ   Final Result      1. No gallstone is seen. However there is gallbladder wall thickening and trace pericholecystic fluid. The finding is concerning for cholecystitis. HIDA scan could be helpful for confirmation.      2. Small right pleural effusion.      3. Nonspecific mildly prominent IVC and hepatic veins. This can sometimes be seen with heart failure.         NM-BILIARY HIDA SCAN FATTY MEAL    (Results Pending)        Assessment/Plan  * Hypertensive urgency- (present on admission)   Assessment & Plan    Poor compliance with outpatient prescriptions reported  Resumed home meds with increased dose of lisinopril 12/23  Reduced BB 12/24 due to  bradycardia  Incre CCB 12/24  Continue Lasix, metoprolol, IV hydralazine  Echo ok  Nephrology consult-- HD 12/23/2018 and 12/24.   TSH euthyroid  Tropes negative x3     Possible cholecystitis without cholelithiasis- (present on admission)   Assessment & Plan    Ultrasound 12/23/2018 shows gallbladder wall thickening with trace pericholecystic fluid  Alk phosphatase elevated at 219  General surgery consult  HIDA scan ordered and pending  Has been taking outpatient PPI without improvement     Transaminitis- (present on admission)   Assessment & Plan    See above; follow HIDA scan results and GS recommendations     ESRD (end stage renal disease) (HCC)- (present on admission)   Assessment & Plan    Dr. Burnett following     S/P coronary artery stent placement- (present on admission)   Assessment & Plan    Cont ASA for now  Cont BB, ACEI  Non-compliant w Lipitor and Plavix  Lipid panel shows good control - hold statin for now given AST/Alk phos     Type 2 diabetes mellitus (HCC)- (present on admission)   Assessment & Plan    Monitor with Accu-Cheks and cover with insulin sliding scale.          VTE prophylaxis: SCDs

## 2018-12-24 NOTE — THERAPY
"Occupational Therapy Evaluation completed.   Functional Status:  SPV for supine<>eob, sit<>stand, standing grooming, seated LB dress.    Plan of Care: Patient with no further skilled OT needs in the acute care setting at this time  Discharge Recommendations:  Equipment: No Equipment Needed. Post-acute therapy Currently anticipate no further skilled therapy needs once patient is discharged from the inpatient setting.    See \"Rehab Therapy-Acute\" Patient Summary Report for complete documentation.    Pt is 69 yo female admitted for hypertension likely related to missing medication doses and volume overload. Pt pmhx includes CAD, DM, ESRD on dialysis MWF, high cholesterol, HTN, hyperlipidemia, and kidney disease. Pt reports she lives with her son who assists as needed but is mostly independent for ADLs/IADLs. Pt presents at her functional baseline, demonstrated basic ADLs without assist, pt has no acute OT needs at this time.   "

## 2018-12-24 NOTE — PROGRESS NOTES
Family at bedside. Pt and family updated on HIDA scan. Awaiting call back to determine time for exam. Pt aware of NPO status   Pt denies pain

## 2018-12-24 NOTE — PROGRESS NOTES
Received pt in bed. No acute distress noted. Slightly lethargic but arousable. Pt primarily Sami speaking. Adult Daughter Bengali speaking only @ bedside. Ellie also present able to translate. A&O x4. On 2LO2 and satting mid 90's/ no home dose. Denies SOB/ cough. Pt denies dizziness/ HA/ blurred vision/N/V/CP or pressure. BP slightly elevated NOC- scheduled meds given. Reports epigastric pain from day shift is now relieved. Denies any general pain/discomfort. Reports poor appetite @ this time/ ellie reports poor appetite @ home as well. Snack provided. No edema to BLE. L AV fistula w/ + thrill and bruit. Ellie conformed pt is oliguric. POC thoroughly discussed w/ pt and family r/t OT consult/ Diuretic therapy/ AM procedure- Hidascan/ NPO @ 0000, BP management/ Current meds and indications. Ellie reports pt non- compliant @ times w/  all BP meds @ home when she feels as if she doesn't need it- Education provided on importance of checking BPs @ home and taking meds regularly- understands. Currently lives w/ uncle who doesn't ensure she takes her meds. Hourly and PRN rounding in place

## 2018-12-24 NOTE — DISCHARGE PLANNING
Outpatient Dialysis Note    Confirmed patient is active at:    [facility]  [address]   [place] [address]      Schedule: Monday, Wednesday, Friday  Time: 5:30 am    Spoke with Sasha at facility who confirmed.    Forwarded records for review.    Dialysis Coordinator, [facility]  Rylie Cline [phone]

## 2018-12-25 VITALS
SYSTOLIC BLOOD PRESSURE: 156 MMHG | HEART RATE: 63 BPM | WEIGHT: 126.1 LBS | TEMPERATURE: 98 F | HEIGHT: 60 IN | BODY MASS INDEX: 24.76 KG/M2 | OXYGEN SATURATION: 92 % | RESPIRATION RATE: 16 BRPM | DIASTOLIC BLOOD PRESSURE: 61 MMHG

## 2018-12-25 PROBLEM — R10.9 ABDOMINAL PAIN: Status: ACTIVE | Noted: 2018-12-23

## 2018-12-25 LAB
GLUCOSE BLD-MCNC: 123 MG/DL (ref 65–99)
GLUCOSE BLD-MCNC: 84 MG/DL (ref 65–99)

## 2018-12-25 PROCEDURE — A9270 NON-COVERED ITEM OR SERVICE: HCPCS | Performed by: HOSPITALIST

## 2018-12-25 PROCEDURE — 700102 HCHG RX REV CODE 250 W/ 637 OVERRIDE(OP): Performed by: HOSPITALIST

## 2018-12-25 PROCEDURE — 700102 HCHG RX REV CODE 250 W/ 637 OVERRIDE(OP): Performed by: NURSE PRACTITIONER

## 2018-12-25 PROCEDURE — 90732 PPSV23 VACC 2 YRS+ SUBQ/IM: CPT | Performed by: INTERNAL MEDICINE

## 2018-12-25 PROCEDURE — 90471 IMMUNIZATION ADMIN: CPT

## 2018-12-25 PROCEDURE — A9270 NON-COVERED ITEM OR SERVICE: HCPCS | Performed by: INTERNAL MEDICINE

## 2018-12-25 PROCEDURE — 82962 GLUCOSE BLOOD TEST: CPT

## 2018-12-25 PROCEDURE — 700111 HCHG RX REV CODE 636 W/ 250 OVERRIDE (IP): Performed by: INTERNAL MEDICINE

## 2018-12-25 PROCEDURE — A9270 NON-COVERED ITEM OR SERVICE: HCPCS | Performed by: NURSE PRACTITIONER

## 2018-12-25 PROCEDURE — 99239 HOSP IP/OBS DSCHRG MGMT >30: CPT | Performed by: INTERNAL MEDICINE

## 2018-12-25 PROCEDURE — 3E0234Z INTRODUCTION OF SERUM, TOXOID AND VACCINE INTO MUSCLE, PERCUTANEOUS APPROACH: ICD-10-PCS | Performed by: INTERNAL MEDICINE

## 2018-12-25 PROCEDURE — 700102 HCHG RX REV CODE 250 W/ 637 OVERRIDE(OP): Performed by: INTERNAL MEDICINE

## 2018-12-25 RX ORDER — CALCIUM CARBONATE 500 MG/1
500 TABLET, CHEWABLE ORAL EVERY 12 HOURS PRN
Qty: 30 TAB | Refills: 0 | Status: SHIPPED | OUTPATIENT
Start: 2018-12-25 | End: 2020-01-22

## 2018-12-25 RX ORDER — AMLODIPINE BESYLATE 10 MG/1
10 TABLET ORAL DAILY
Qty: 30 TAB | Refills: 0 | Status: SHIPPED | OUTPATIENT
Start: 2018-12-26 | End: 2019-01-03 | Stop reason: SDUPTHER

## 2018-12-25 RX ORDER — METOPROLOL TARTRATE 100 MG/1
100 TABLET ORAL 2 TIMES DAILY
Qty: 60 TAB | Refills: 1 | Status: SHIPPED | OUTPATIENT
Start: 2018-12-25 | End: 2019-01-03 | Stop reason: SDUPTHER

## 2018-12-25 RX ORDER — FAMOTIDINE 40 MG/1
40 TABLET, FILM COATED ORAL DAILY
Qty: 60 TAB | Refills: 0 | Status: SHIPPED | OUTPATIENT
Start: 2018-12-25 | End: 2020-01-22

## 2018-12-25 RX ORDER — LISINOPRIL 20 MG/1
40 TABLET ORAL DAILY
Qty: 30 TAB | Refills: 0 | Status: SHIPPED | OUTPATIENT
Start: 2018-12-25 | End: 2019-01-03 | Stop reason: SDUPTHER

## 2018-12-25 RX ADMIN — AMLODIPINE BESYLATE 10 MG: 10 TABLET ORAL at 04:39

## 2018-12-25 RX ADMIN — Medication 81 MG: at 04:39

## 2018-12-25 RX ADMIN — LISINOPRIL 40 MG: 20 TABLET ORAL at 04:39

## 2018-12-25 RX ADMIN — PNEUMOCOCCAL VACCINE POLYVALENT 25 MCG
25; 25; 25; 25; 25; 25; 25; 25; 25; 25; 25; 25; 25; 25; 25; 25; 25; 25; 25; 25; 25; 25; 25 INJECTION, SOLUTION INTRAMUSCULAR; SUBCUTANEOUS at 10:58

## 2018-12-25 RX ADMIN — METOPROLOL TARTRATE 50 MG: 50 TABLET ORAL at 04:39

## 2018-12-25 RX ADMIN — FUROSEMIDE 40 MG: 40 TABLET ORAL at 04:39

## 2018-12-25 RX ADMIN — SEVELAMER CARBONATE 2400 MG: 800 TABLET, FILM COATED ORAL at 10:56

## 2018-12-25 RX ADMIN — SEVELAMER CARBONATE 2400 MG: 800 TABLET, FILM COATED ORAL at 04:39

## 2018-12-25 ASSESSMENT — PAIN SCALES - GENERAL
PAINLEVEL_OUTOF10: 0

## 2018-12-25 NOTE — PROCEDURES
Pt with ESRD, presented with volume overloas.  Pt is doing better.  Seen and examined while getting HD.

## 2018-12-25 NOTE — DISCHARGE INSTRUCTIONS
Discharge Instructions    Discharged to home by car with relative. Discharged via wheelchair, hospital escort: Yes.  Special equipment needed: Not Applicable    Be sure to schedule a follow-up appointment with your primary care doctor or any specialists as instructed.     Discharge Plan:   Diet Plan: Discussed  Activity Level: Discussed  Confirmed Follow up Appointment: Appointment Scheduled  Confirmed Symptoms Management: Discussed  Medication Reconciliation Updated: Yes  Pneumococcal Vaccine Administered/Refused: Given (See MAR)  Influenza Vaccine Indication: Not indicated: Previously immunized this influenza season and > 8 years of age    I understand that a diet low in cholesterol, fat, and sodium is recommended for good health. Unless I have been given specific instructions below for another diet, I accept this instruction as my diet prescription.   Other diet: diabetic, renal friendly    Special Instructions: None    · Is patient discharged on Warfarin / Coumadin?   No     Depression / Suicide Risk    As you are discharged from this [facility] facility, it is important to learn how to keep safe from harming yourself.    Recognize the warning signs:  · Abrupt changes in personality, positive or negative- including increase in energy   · Giving away possessions  · Change in eating patterns- significant weight changes-  positive or negative  · Change in sleeping patterns- unable to sleep or sleeping all the time   · Unwillingness or inability to communicate  · Depression  · Unusual sadness, discouragement and loneliness  · Talk of wanting to die  · Neglect of personal appearance   · Rebelliousness- reckless behavior  · Withdrawal from people/activities they love  · Confusion- inability to concentrate     If you or a loved one observes any of these behaviors or has concerns about self-harm, here's what you can do:  · Talk about it- your feelings and reasons for harming yourself  · Remove any means that you  might use to hurt yourself (examples: pills, rope, extension cords, firearm)  · Get professional help from the community (Mental Health, Substance Abuse, psychological counseling)  · Do not be alone:Call your Safe Contact- someone whom you trust who will be there for you.  · Call your local CRISIS HOTLINE 361-0452 or 746-480-2169  · Call your local Children's Mobile Crisis Response Team [place] [phone] [phone] or [employer]  · Call the toll free National Suicide Prevention Hotlines   · National Suicide Prevention Lifeline 111-202-UCWD (8226)  · National [employer] Line Network 800-SUICIDE (853-4469)      Hipertensión  (Hypertension)  La hipertensión, conocida comúnmente noemi presión arterial meliza, se produce cuando la andrae bombea en las arterias con mucha fuerza. Las arterias son los vasos sanguíneos que transportan la andrae desde el corazón hacia todas las partes del cuerpo. Julianna lectura de la presión arterial consiste en un número más alto sobre un número más bajo, por ejemplo, 110/72. El número más alto (presión sistólica) corresponde a la presión interna de las arterias cuando el corazón bombea andrae. El número más bajo (presión diastólica) corresponde a la presión interna de las arterias cuando el corazón se relaja. En condiciones ideales, la presión arterial debe ser inferior a 120/80.  La hipertensión fuerza al corazón a trabajar más para bombear la andrae. Las arterias pueden estrecharse o ponerse rígidas. La hipertensión no tratada o no controlada puede causar infarto de miocardio, ictus, enfermedad renal y otros problemas.  FACTORES DE RIESGO  Algunos factores de riesgo de hipertensión son controlables, jaki otros no lo son.  Entre los factores de riesgo que usted no puede controlar, se incluyen los siguientes:  · La mack. El riesgo es mayor para las personas afroamericanas.  · La edad. Los riesgos aumentan con la edad.  · El sexo. Antes de los 45 años, los hombres corren más riesgo que las  mujeres. Después de los 65 años, las mujeres corren más riesgo que los hombres.  Entre los factores de riesgo que usted puede controlar, se incluyen los siguientes:  · No hacer la cantidad suficiente de actividad física o ejercicio.  · Tener sobrepeso.  · Consumir mucha grasa, azúcar, calorías o sal en la dieta.  · Beber alcohol en exceso.  SIGNOS Y SÍNTOMAS  Por lo general, la hipertensión no causa signos o síntomas. La hipertensión arterial demasiado meliza (crisis hipertensiva) puede causar dolor de sergey, ansiedad, falta de aire y hemorragia nasal.  DIAGNÓSTICO  Para detectar si usted tiene hipertensión, el médico le medirá la presión arterial mientras esté sentado, con el brazo levantado a la altura del corazón. Debe medirla al menos dos veces en el mismo brazo. Determinadas condiciones pueden causar bernardo diferencia de presión arterial entre el brazo cody y el derecho. El hecho de tener bernardo my lectura de la presión arterial más meliza que lo normal no significa que necesita un tratamiento. Si no está juan manuel si tiene hipertensión arterial, es posible que se le pida que regrese otro día para volver a controlarle la presión arterial. O shantell se le puede pedir que se controle la presión arterial en donahue casa irma 1 o más meses.  TRATAMIENTO  El tratamiento de la hipertensión arterial incluye hacer cambios en el estilo de augie y, posiblemente, roscoe medicamentos. Un estilo de augie saludable puede ayudar a bajar la presión arterial meliza. Quizá deba cambiar algunos hábitos.  Los cambios en el estilo de augie pueden incluir lo siguiente:  · Seguir la dieta DASH. Esta dieta tiene un alto contenido de frutas, verduras y cereales integrales. Incluye poca cantidad de sal, jossie carrington y azúcares agregados.  · Mantenga el consumo de sodio por debajo de 2 300 mg por día.  · Realizar al menos entre 30 y 45 minutos de ejercicio aeróbico, 4 veces por semana noemi mínimo.  · Perder peso, si es necesario.  · No fumar.  · Limitar  el consumo de bebidas alcohólicas.  · Aprender formas de reducir el estrés.  El médico puede recetarle medicamentos si los cambios en el estilo de augie no son suficientes para lograr controlar la presión arterial y si bernardo de las siguientes afirmaciones es verdadera:  · Tiene entre 18 y 59 años y donahue presión arterial sistólica está por encima de 140.  · Tiene 60 años o más y donahue presión arterial sistólica está por encima de 150.  · Donahue presión arterial diastólica está por encima de 90.  · Tiene diabetes y donahue presión arterial sistólica está por encima de 140 o donahue presión arterial diastólica está por encima de 90.  · Tiene bernardo enfermedad renal y donahue presión arterial está por encima de 140/90.  · Tiene bernardo enfermedad cardíaca y donahue presión arterial está por encima de 140/90.  La presión arterial deseada puede variar en función de las enfermedades, la edad y otros factores personales.  INSTRUCCIONES PARA EL CUIDADO EN EL HOGAR  · Shilpa que le midan de nuevo la presión arterial según las indicaciones del médico.  · [place] los medicamentos solamente noemi se lo haya indicado el médico. Siga cuidadosamente las indicaciones. Los medicamentos para la presión arterial deben tomarse según las indicaciones. Los medicamentos pierden eficacia al omitir las dosis. El hecho de omitir las dosis también aumenta el riesgo de otros problemas.  · No fume.  · Contrólese la presión arterial en donahue casa según las indicaciones del médico.  SOLICITE ATENCIÓN MÉDICA SI:  · Piensa que tiene bernardo reacción alérgica a los medicamentos.  · Tiene mareos o devi de sergey con recurrencia.  · Tiene hinchazón en los tobillos.  · Tiene problemas de visión.  SOLICITE ATENCIÓN MÉDICA DE INMEDIATO SI:  · Siente un dolor de sergey intenso o confusión.  · Siente debilidad inusual, adormecimiento o que se desmayará.  · Siente dolor intenso en el pecho o en el abdomen.  · Vomita repetidas veces.  · Tiene dificultad para respirar.  ASEGÚRESE DE QUE:  · Comprende estas  instrucciones.  · Controlará donahue afección.  · Recibirá ayuda de inmediato si no mejora o si empeora.  Esta información no tiene noemi fin reemplazar el consejo del médico. Asegúrese de hacerle al médico cualquier pregunta que tenga.  Document Released: 12/18/2006 Document Revised: 05/03/2016 Document Reviewed: 10/10/2014  [employer] Interactive Patient Education © 2017 [employer]        Enfermedad renal en etapa terminal  (End-Stage Kidney Disease)  La enfermedad renal terminal ocurre cuando los riñones están mcgraw dañados que shahida de funcionar. Los riñones son dos órganos que desempeñan muchas funciones importantes en el cuerpo, noemi las siguientes:  · Eliminar desechos y el exceso de líquido de la andrae.  · Producir hormonas que mantienen la cantidad de líquido en los tejidos y los vasos sanguíneos.  · Mantener la cantidad correcta de líquidos y de sustancias químicas en el cuerpo.  Cuando los riñones están dañados y no pueden funcionar, ocurren problemas potencialmente mortales. Sin la ayuda de los riñones, las toxinas se acumulan en la andrae. En la enfermedad renal en etapa terminal, los riñones no pueden mejorar.  CAUSAS  Por lo general, la enfermedad renal en etapa terminal ocurre cuando bernardo enfermedad renal de larga duración (crónica) empeora. También puede ocurrir después de un daño repentino en los riñones (lesión renal aguda).  FACTORES DE RIESGO  Es más probable que esta afección se manifieste en las personas con estas características:  · Mayores de 60 años.  · Ser hombre.  · Ascendencia afroamericana.  · Fumadores actuales o exfumadores.  · Obesos.  También puede correr un riesgo más alto de tener enfermedad renal en etapa terminal en los siguientes casos:  · Si tiene antecedentes familiares de enfermedad renal crónica.  · Si tuvo enfermedad renal irma muchos años.  · Si tiene otras enfermedades prolongadas que afectan los riñones, por ejemplo:  ¨ Enfermedad cardiovascular, incluida hipertensión  arterial.  ¨ Diabetes.  ¨ Algunas enfermedades que afectan el sistema inmunitario.  SIGNOS Y SÍNTOMAS  · Hinchazón (edema) de la abdiel, las piernas, los tobillos o los pies.  · Adormecimiento, hormigueo o pérdida de la sensibilidad (sensación) en las mis o los pies.  · Cansancio (letargo).  · Náuseas o vómitos.  · Confusión, dificultad para concentrarse o pérdida de la conciencia.  · Dolor en el pecho.  · Falta de aire.  · [place] o ninguna producción de orina.  · Contracciones y calambres musculares, especialmente en las piernas.  · Picazón permanente.  · Pérdida del apetito.  · Palidez de la piel y del tejido que recubre los párpados (conjuntiva).  · Carey de sergey.  · La piel se oscurece o se aclara de manera anormal.  · Disminución de la masa muscular (atrofia muscular).  · Aparecen hematomas con facilidad.  · Hipo frecuente.  · Ausencia de la menstruación en las mujeres.  · Convulsiones.  DIAGNÓSTICO  El médico le tomará la presión arterial y le hará algunos estudios. Estos pueden incluir los siguientes:  · Análisis de orina.  · Análisis de andrae.  · Estudios de diagnóstico por imágenes.  · Un estudio en el que se yanet bernardo muestra de tejido de los riñones para analizarla con un microscopio (biopsia de riñón).  TRATAMIENTO  Existen dos tipos de tratamiento para la enfermedad renal en etapa terminal:  · Un procedimiento que elimina los desechos tóxicos del organismo (diálisis). En función del tipo de diálisis que elija, se la puede realizar más de bernardo vez al día (diálisis peritoneal) o varias veces por semana (hemodiálisis).  · Cirugía para recibir un nuevo riñón (trasplante de riñón).  Además de la diálisis o de un trasplante de riñón, rose vez deba roscoe medicamentos:  · Para controlar la presión arterial elevada (hipertensión).  · Para controlar el colesterol.  · Para mantener un nivel chu de electrólitos en la andrae.  También pueden indicarle que siga bernardo dieta específica que incluye requisitos o límites  para lo siguiente:  · Jorge (sodio).  · Proteínas.  · Fósforo.  · Potasio.  · Calcio.  INSTRUCCIONES PARA EL CUIDADO EN EL HOGAR  · Siga la dieta que le estes indicado.  · Early los medicamentos de venta candelaria y los recetados solamente noemi se lo haya indicado el médico.  ¨ No tome ningún medicamento nuevo a menos que lo haya autorizado el médico. Muchos medicamentos pueden empeorar el daño renal.  ¨ No tome ningún suplemento vitamínico y mineral a menos que lo haya autorizado el médico. Muchos suplementos nutricionales pueden empeorar el daño renal.  ¨ Es posible que sea necesario ajustar la dosis de algunos medicamentos que yanet.  · No consuma ningún producto que contenga tabaco, lo que incluye cigarrillos, tabaco de mascar y cigarrillos electrónicos. Si necesita ayuda para dejar de fumar, consulte al médico.  · Concurra a todas las visitas de control noemi se lo haya indicado el médico. [place] es importante.  · Lleve un control de la presión arterial. Informe al médico los cambios en la presión arterial noemi kiya se lo haya indicado.  · [place] y mantenga un peso saludable. Si necesita ayuda para lograrlo, consulte a donahue médico.  · Comience o continúe un plan de ejercicios. Intente hacer ejercicios al menos 30 minutos al día, 5 días a la semana.  · Esté al día con las vacunas noemi se lo haya indicado el médico.  SOLICITE ATENCIÓN MÉDICA SI:  · Los síntomas empeoran.  · Presenta nuevos síntomas.  SOLICITE ATENCIÓN MÉDICA DE INMEDIATO SI:  · Siente debilidad en un brazo o bernardo pierna de un lado del cuerpo.  · Tiene dificultad para hablar o habla arrastrando las palabras.  · Tiene cambios repentinos en la visión.  · Sufre bernardo cefalea intensa.  · Aumenta repentinamente de peso.  · Tiene dificultad para respirar.  · Los síntomas empeoran repentinamente.  PARA OBTENER MÁS INFORMACIÓN  · Asociación Americana de Pacientes Renales (American Association of Kidney Patients, AAKP): www.aakp.org  · Fundación Nacional del Riñón  (National Kidney Foundation): www.kidney.org  · Fondo Americano para Problemas Renales (American Kidney Fund): www.akfinc.org  · Programa de rehabilitación de Life Options: www.lifeoptions.org y www.kidneyschool.org  Esta información no tiene noemi fin reemplazar el consejo del médico. Asegúrese de hacerle al médico cualquier pregunta que tenga.  Document Released: 03/26/2009 Document Revised: 04/10/2017 Document Reviewed: 08/16/2013  Elsevier Interactive Patient Education © 2017 [employer]    Amlodipine tablets  ¿Qué es kiya medicamento?  La AMLODIPINA es un bloqueador de los alexis de calcio. Afecta la cantidad de calcio en las células del corazón y de los músculos. [place] produce bernardo relajación de los vasos sanguíneos, lo que puede reducir la carga de trabajo del corazón. Kiya medicamento reduce la meliza presión sanguínea. Además se utiliza para prevenir el dolor en el pecho.  Kiya medicamento puede ser utilizado para otros usos; si tiene alguna pregunta consulte con donahue proveedor de atención médica o con donahue farmacéutico.  MARCAS COMUNES: Norvasc  ¿Qué le huang informar a mi profesional de la lazaro antes de roscoe kiya medicamento?  Necesita saber si usted presenta alguno de los siguientes problemas o situaciones:  -problemas cardiacos, tales noemi insuficiencia cardiaca o estenosis aórtica  -enfermedad hepática  -bernardo reacción alérgica o inusual a la amlodipina, a otros medicamentos, alimentos, colorantes o conservantes  -si está embarazada o buscando quedar embarazada  -si está amamantando a un bebé  ¿Cómo huang utilizar kiya medicamento?  [place] kiya medicamento por vía oral con un vaso de agua. Siga las instrucciones de la etiqueta del medicamento. [place] bhaskar dosis a intervalos regulares. No tome donahue medicamento con bernardo frecuencia mayor a la indicada.  Hable con donahue pediatra para informarse acerca del uso de kiya medicamento en niños. Puede requerir atención especial. Kiya medicamento ha sido recetado a niños tan menores  noemi de 6 años de edad.  Los pacientes de más de 65 años de edad pueden presentar reacciones más sharyn a kiya medicamento y necesitar dosis menores.  Sobredosis: Póngase en contacto inmediatamente con un centro toxicológico o bernardo yoselin de urgencia si usted edelmira que haya tomado demasiado medicamento.  ATENCIÓN: Kiya medicamento es solo para usted. No comparta kiya medicamento con nadie.  ¿Qué sucede si me olvido de bernardo dosis?  Si olvida bernardo dosis, tómela lo antes posible. Si es james la hora de la próxima dosis, tome sólo cricket dosis. No tome dosis adicionales o dobles.  ¿Qué puede interactuar con kiya medicamento?  -suplementos dietéticos o a base de hierbas  -anestésicos locales o generales  -medicamentos para la meliza presión sanguínea  -medicamentos para problemas de próstata  -rifampicina  Puede ser que esta lista no menciona todas las posibles interacciones. Informe a donahue profesional de la lazaro de todos los productos a base de hierbas, medicamentos de venta candelaria o suplementos nutritivos que esté tomando. Si usted fuma, consume bebidas alcohólicas o si utiliza drogas ilegales, indíqueselo también a donahue profesional de la lazaro. Algunas sustancias pueden interactuar con donahue medicamento.  ¿A qué huang estar atento al usar kiya medicamento?  Visite a donahue médico o a donahue profesional de la lazaro para chequear donahue evolución periódicamente. Es importante que controle donahue presión sanguínea y frecuencia cardiaca regularmente. Pregunte a donahue médico o a donahue profesional de la lazaro cuál debe ser donahue presión sanguínea y frecuencia cardiaca y cuándo deberá comunicarse con él o bharat.  Kiya medicamento puede hacerle sentirse confundido, mareado o aturdido. No conduzca ni utilice maquinaria, ni miranda nada que le exija permanecer en estado de alerta hasta que sepa cómo le afecta kiya medicamento. Para reducir el riesgo de mareos o desmayos, no se siente ni se ponga de pie con rapidez, especialmente si es un paciente de edad avanzada. Evite las  bebidas alcohólicas ya que pueden provocarle mareos.  No suspenda la amlodipina de manera abrupta. Consulte a donahue médico o a donahue profesional de la lazaro sobre cómo reducir donahue dosis de manera gradual.  ¿Qué efectos secundarios puedo tener al utilizar [place] medicamento?  Efectos secundarios que debe informar a donahue médico o a donahue profesional de la lazaro tan pronto noemi sea posible:  -reacciones alérgicas noemi erupción cutánea, picazón o urticarias, hinchazón de la abdiel, labios o lengua  -problemas respiratorios  -cambios en la visión o audición  -dolor en el pecho  -pulso cardiaco rápido, irregular  -hinchazón de piernas o tobillos  Efectos secundarios que, por lo general, no requieren atención médica (debe informarlos a donahue médico o a donahue profesional de la lazaro si persisten o si son molestos):  -boca seca  -enrojecimiento de la abdiel  -náuseas, vómito  -gases o dolor de estómago  -debilidad o fatiga  -dificultad para dormir  Puede ser que esta lista no menciona todos los posibles efectos secundarios. Comuníquese a donahue médico por asesoramiento médico sobre los efectos secundarios. Usted puede informar los efectos secundarios a la FDA por teléfono al [phone].  ¿Dónde huang guardar mi medicina?  [place] fuera del alcance de los niños.  Guárdela a temperatura ambiente, entre 15 y 30 grados C (59 y 86 grados F). Protéjala mehreen. Mantenga el envase shantell cerrado. Deseche todo el medicamento que no haya utilizado, después de la fecha de vencimiento.  ATENCIÓN: [place] folleto es un resumen. Puede ser que no cubra toda la posible información. Si usted tiene preguntas acerca de esta medicina, consulte con donahue médico, donahue farmacéutico o donahue profesional de la lazaro.  © 2018 Elsevier/[employer] (2016-02-09 00:00:00)    Calcium Carbonate chewable tablets  ¿Qué es [place] medicamento?  El CARBONATO DE CALCIO es bernardo sal de calcio. Se usa noemi un antiácido para aliviar los síntomas de indigestión o acidez estomacal. [place] medicamento  también se puede utilizar para prevenir el osteoporosis, noemi un suplemento dietético con calcio y para tratar los niveles altos de fosfato en pacientes con enfermedad renal.  Kiya medicamento puede ser utilizado para otros usos; si tiene alguna pregunta consulte con donahue proveedor de atención médica o con donahue farmacéutico.  MARCAS COMUNES: Judith-Mints, Judith-[place], Alkets, Antacid Fast Dissolve, Grady-Gest, Calcium Antacid, Maalox, Maalox Antacid Barrier, Maalox Quick Dissolve, Mylanta, Pepto-Bismol, Rolaids Extra Strength, Titralac, Tums, Tums Chewy Bites, Tums Cool Relief, Tums E-X, Tums Freshers, Tums Kids, Tums Lasting Effects, Tums Smooth Dissolve, Tums Smoothies, Tums Ultra  ¿Qué le huang informar a mi profesional de la lazaro antes de roscoe kiya medicamento?  Necesita saber si usted presenta alguno de los siguientes problemas o situaciones:  -estreñimiento  -deshidratación  -niveles elevados de calcio en la andrae  -enfermedad renal  -úlcera, obstrucción o sangrado estomacal  -bernardo reacción alérgica o inusual al carbonato de calcio, a otros medicamentos, alimentos, colorantes o conservantes  -si está embarazada o buscando quedar embarazada  -si está amamantando a un bebé  ¿Cómo huang utilizar kiya medicamento?  [place] kiya medicamento por vía oral. Mastíquelo completamente antes de tragar. Siga las instrucciones de la etiqueta del medicamento. Después de roscoe kiya medicamento, milli un vaso de agua. Los antiácidos se shraddha generalmente después de las comidas y a la hora de acostarse o noemi lo haya indicado donahue médico o donahue profesional de la lazaro. [place] bhaskar dosis a intervalos regulares. No tome donahue medicamento con bernardo frecuencia mayor que la indicada.  Hable con donahue pediatra para informarse acerca del uso de kiya medicamento en niños. Aunque kiya medicamento se puede recetar a los niños para condiciones selectivas, las precauciones se aplican.  Sobredosis: Póngase en contacto inmediatamente con un centro toxicológico o bernardo  yoselin de urgencia si usted edelmira que haya tomado demasiado medicamento.  ATENCIÓN: [place] medicamento es solo para usted. No comparta [place] medicamento con nadie.  ¿Qué sucede si me olvido de bernardo dosis?  Si olvida bernardo dosis, tómela lo antes posible. Si es james la hora de la próxima dosis, tome sólo cricket dosis. No tome dosis adicionales o dobles.  ¿Qué puede interactuar con madison medicamento?  No tome esta medicina con ninguno de los siguientes medicamentos:  -cloruro de amonio  -metenamina  Esta medicina también puede interactuar con los siguientes medicamentos:  -antibióticos, tales noemi ciprofloxacina, tetraciclina  -captopril  -delarvidina  -gabapentina  -sales de vanessa  -medicamentos para infecciones micóticas, quetoconazol e itraconazol  -medicamentos para convulsiones, tales noemi etotoína y fenitoína  -micofenolato  -quinidina  -rosuvastatina  -sucralfato  -medicamento tiroideo  Puede ser que esta lista no menciona todas las posibles interacciones. Informe a donahue profesional de la lazaro de todos los productos a base de hierbas, medicamentos de venta candelaria o suplementos nutritivos que esté tomando. Si usted fuma, consume bebidas alcohólicas o si utiliza drogas ilegales, indíqueselo también a donahue profesional de la lazaro. Algunas sustancias pueden interactuar con donahue medicamento.  ¿A qué huang estar atento al usar madison medicamento?  Si los síntomas no comienzan a mejorar o si empeoran, consulte con donahue médico o con donahue profesional de la lazaro. Si tiene problemas estomacales, no se dé tratamiento usted mismo con madison medicamento irma más de 2 semanas. Si observa heces de color oscuro o aspecto alquitranado, si sufre bernardo hemorragia rectal o si se siente inusualmente cansado, consulte a donahue médico. No cambie de producto antiácido sin asesoramiento.  Si está tomando otros medicamentos, deje transcurrir un período de por lo menos 2 horas antes o después de roscoe bernardo dosis de [place] medicamento.  [place]arly varios vasos de agua por día.  [place] lo ayudará a reducir la probabilidad de sufrir estreñimiento.  ¿Qué efectos secundarios puedo tener al utilizar kiya medicamento?  Efectos secundarios que debe informar a donahue médico o a donahue profesional de la lazaro tan pronto noemi sea posible:  -reacciones alérgicas noemi erupción cutánea, picazón o urticarias, hinchazón de la abdiel, labios o lengua  -confusión o irritabilidad  -dolor de sergey  -pérdida del apetito  -náuseas, vómito  -cansancio o debilidad inusual  Efectos secundarios que, por lo general, no requieren atención médica (debe informarlos a donahue médico o a donahue profesional de la lazaro si persisten o si son molestos):  -estreñimiento  -gases  Puede ser que esta lista no menciona todos los posibles efectos secundarios. Comuníquese a donahue médico por asesoramiento médico sobre los efectos secundarios. Usted puede informar los efectos secundarios a la FDA por teléfono al [phone]-[phone].  ¿Dónde huang guardar mi medicina?  [place] fuera del alcance de los niños.  Guárdela a temperatura ambiente, entre 15 y 30 grados C (59 y 86 grados F). Deseche todo el medicamento que no haya utilizado, después de la fecha de vencimiento.  ATENCIÓN: Kiya folleto es un resumen. Puede ser que no cubra toda la posible información. Si usted tiene preguntas acerca de esta medicina, consulte con donahue médico, donahue farmacéutico o donahue profesional de la lazaro.  © 2018 Elsevier/Gold Standard (2016-02-10 00:00:00)    Famotidine tablets or gelcaps  ¿Qué es kiya medicamento?  La FAMOTIDINA es un tipo de antihistamínico que bloquea la liberación de ácido estomacal. Kiya medicamento se utiliza para tratar úlceras estomacales e intestinales. También puede aliviar la acidez de estómago causado por el reflujo ácido.  Kiya medicamento puede ser utilizado para otros usos; si tiene alguna pregunta consulte con donahue proveedor de atención médica o con donahue farmacéutico.  MARCAS COMUNES: Heartburn Relief, Pepcid, Pepcid AC, Pepcid AC Maximum Strength  ¿Qué le  huang informar a mi profesional de la lazaro antes de roscoe kiya medicamento?  Necesita saber si usted presenta alguno de los siguientes problemas o situaciones:  -enfermedad renal o hepática  -dificultad para tragar  -bernardo reacción alérgica o inusual a la famotidina, a otros medicamentos, alimentos, colorantes o conservantes  -si está embarazada o buscando quedar embarazada  -si está amamantando a un bebé  ¿Cómo huang utilizar kiya medicamento?  [place] kiya medicamento por vía oral con un vaso de agua. Siga las instrucciones de la etiqueta del medicamento. Si sólo yanet kiya medicamento bernardo vez al día, tómelo a la hora de acostarse. [place] bhaskar dosis a intervalos regulares. No tome donahue medicamento con bernardo frecuencia mayor que la indicada.  Hable con donahue pediatra para informarse acerca del uso de kiya medicamento en niños. Puede requerir atención especial.  Sobredosis: Póngase en contacto inmediatamente con un centro toxicológico o bernardo yoselin de urgencia si usted edelmira que haya tomado demasiado medicamento.  ATENCIÓN: Kiya medicamento es solo para usted. No comparta kiya medicamento con nadie.  ¿Qué sucede si me olvido de bernardo dosis?  Si olvida bernardo dosis, tómela lo antes posible. Si es james la hora de la próxima dosis, tome sólo cricket dosis. No tome dosis adicionales o dobles.  ¿Qué puede interactuar con kiya medicamento?  -delavirdina  -itraconazol  -quetoconazol  Puede ser que esta lista no menciona todas las posibles interacciones. Informe a donahue profesional de la lazaro de todos los productos a base de hierbas, medicamentos de venta candelaria o suplementos nutritivos que esté tomando. Si usted fuma, consume bebidas alcohólicas o si utiliza drogas ilegales, indíqueselo también a donahue profesional de la lazaro. Algunas sustancias pueden interactuar con donahue medicamento.  ¿A qué huang estar atento al usar kiya medicamento?  Si donahue problema no mejora o si empeora, consulte con donahue médico o con donahue profesional de la lazaro. Complete todo el tratamiento  con las tabletas recetadas, aun cuando se sienta mejor.  No tome aspirina, ibuprofeno u otros medicamentos antiinflamatorios. Estos medicamentos pueden agravar el problema.  No fume ni consuma alcohol. [place] irrita aún más el estómago y puede prolongar el tiempo necesario para que donahue úlcera se cure.  Si observa heces de color oscuro y con aspecto alquitranado o vomita lo que parecen granos de café, consulte inmediatamente con donahue médico o con donahue profesional de la lazaro. Puede tener bernardo úlcera sangrante.  ¿Qué efectos secundarios puedo tener al utilizar kiya medicamento?  Efectos secundarios que debe informar a donahue médico o a donahue profesional de la lazaro tan pronto noemi sea posible:  -agitación, nerviosismo  -confusión  -alucinaciones  -erupción cutánea, picazón  Efectos secundarios que, por lo general, no requieren atención médica (debe informarlos a donahue médico o a donahue profesional de la lazaro si persisten o si son molestos):  -estreñimiento  -diarrea  -mareos  -dolor de sergey  Puede ser que esta lista no menciona todos los posibles efectos secundarios. Comuníquese a donahue médico por asesoramiento médico sobre los efectos secundarios. Usted puede informar los efectos secundarios a la FDA por teléfono al [phone].  ¿Dónde huang guardar mi medicina?  [place]anténgala fuera del alcance de los niños.  Guárdela a temperatura ambiente, entre 15 y 30 grados C (59 y 86 grados F). No la congele. Mantenga el envase shantell cerrado. Deseche todo el medicamento que no haya utilizado, después de la fecha de vencimiento.  ATENCIÓN: Kiya folleto es un resumen. Puede ser que no cubra toda la posible información. Si usted tiene preguntas acerca de esta medicina, consulte con donahue médico, donahue farmacéutico o donahue profesional de la lazaro.  © 2018 Elsevier/Gold Standard (2016-02-09 00:00:00)

## 2018-12-25 NOTE — PROGRESS NOTES
3hr HD started @ 1605 and completed @ 1906,tx well tolerated,VSS,net UF = 3000ml.LUAAVF + B/T,cannulation sites covered with DD,CDI,report given to J CARLOS Omer RN.Waited 2 hrs for Hida scan to be done.

## 2018-12-25 NOTE — PROGRESS NOTES
Pt. Was discharged home via car with relatives. Discharge paperwork was discussed with the patient. All personal belongs were accounted for and taken with the patient. LDA's were removed and the tele monitor was disconnected. Pt. prescriptions sent to pharmacy. Patient denied escort and walked out with family.

## 2018-12-25 NOTE — PROGRESS NOTES
Assumed care at 0700, bedside report received from night shift RN. Patient is AOx4 with no signs of distress. Pt. Is SB 56 on the monitor. Initial assessment completed, orders reviewed, call light within reach, and hourly rounding in place. POC addressed with patient, no additional questions at this time. Patient [place] Island[place] speaking,  used.

## 2018-12-25 NOTE — PROGRESS NOTES
Patient brought to room 814-1 by transport from dialysis. Patient is alert and oriented x4 with no complaint of pain at this time. She is resting comfortably in bed, call light within reach.

## 2018-12-26 ENCOUNTER — PATIENT OUTREACH (OUTPATIENT)
Dept: HEALTH INFORMATION MANAGEMENT | Facility: OTHER | Age: 68
End: 2018-12-26

## 2018-12-26 NOTE — DISCHARGE SUMMARY
Discharge Summary    CHIEF COMPLAINT ON ADMISSION  Chief Complaint   Patient presents with   • Epigastric Pain     Pt reports epigastric pain for 3 mos/ sent by MD for further eval. pt denies CP/nausea/diarrhea or pain radiating to back       Reason for Admission  Sent by Urgent Care     Admission Date  12/22/2018    CODE STATUS  Full    HPI & HOSPITAL COURSE  This is a 68 y.o. female with history of end-stage renal disease, on hemodialysis, diabetes, hypertension, CAD, status post stent placement, presented with epigastric pain and dizziness.  She found to have hypertensive urgency with systolic blood pressure in the 200s, presumably secondary to noncompliance with blood pressure medications.  Her blood pressure medications restarted, dose of Norvasc increased to 10 mg daily, continued metoprolol, lisinopril.  She received hemodialysis in the hospital routinely.  Her blood pressure improved.  Her abdominal pain improved spontaneously.  Abdominal ultrasound showed gallbladder with possible wall thickening.  HIDA scan was negative for cholecystitis.  Patient was prescribed Pepcid upon discharge.  Recommended to follow-up with PCP if pain recurs;  referral to surgery on outpatient basis if feasible.       Therefore, she is discharged in fair and stable condition to home with close outpatient follow-up.    The patient met 2-midnight criteria for an inpatient stay at the time of discharge.    Discharge Date  12/25/2018    FOLLOW UP ITEMS POST DISCHARGE  PCP    DISCHARGE DIAGNOSES  Principal Problem:    Hypertensive urgency POA: Yes  Active Problems:    Abdominal pain POA: Yes    Type 2 diabetes mellitus (HCC) POA: Yes    S/P coronary artery stent placement POA: Yes      Overview: 2 Synergy NOEMI to 100% RCA.    ESRD (end stage renal disease) (HCC) POA: Yes    Transaminitis POA: Yes  Resolved Problems:    * No resolved hospital problems. *      FOLLOW UP  Future Appointments  Date Time Provider Department Center   1/3/2019  11:25 AM VASQUEZ [place]     No follow-up provider specified.    MEDICATIONS ON DISCHARGE     Medication List      START taking these medications      Instructions   amLODIPine 10 MG Tabs  Start taking on:  12/26/2018  Commonly known as:  NORVASC   Take 1 Tab by mouth every day.  Dose:  10 mg     calcium carbonate 500 MG Chew  Commonly known as:  TUMS   Take 1 Tab by mouth every 12 hours as needed (Indigestion).  Dose:  500 mg     famotidine 40 MG Tabs  Commonly known as:  PEPCID   Take 1 Tab by mouth every day.  Dose:  40 mg        CHANGE how you take these medications      Instructions   lisinopril 20 MG Tabs  What changed:  how much to take  Commonly known as:  PRINIVIL   Take 2 Tabs by mouth every day.  Dose:  40 mg        CONTINUE taking these medications      Instructions   aspirin 81 MG Chew chewable tablet  Commonly known as:  ASA   Take 81 mg by mouth every day.  Dose:  81 mg     atorvastatin 20 MG Tabs  Commonly known as:  LIPITOR   Take 20 mg by mouth every evening.  Dose:  20 mg     furosemide 40 MG Tabs  Commonly known as:  LASIX   Take 40 mg by mouth every day.  Dose:  40 mg     hydrOXYzine HCl 25 MG Tabs  Commonly known as:  ATARAX   Take 25 mg by mouth at bedtime as needed for Itching or Anxiety.  Dose:  25 mg     metoprolol 100 MG Tabs  Commonly known as:  LOPRESSOR   Take 1 Tab by mouth 2 times a day.  Dose:  100 mg     ROPINIRole 0.25 MG Tabs  Commonly known as:  REQUIP   Take 0.25 mg by mouth every bedtime.  Dose:  0.25 mg            Allergies  No Known Allergies    DIET  No orders of the defined types were placed in this encounter.      ACTIVITY  As tolerated.  Weight bearing as tolerated    CONSULTATIONS  Nephrology    PROCEDURES  None    LABORATORY  Lab Results   Component Value Date    SODIUM 138 12/22/2018    POTASSIUM 4.3 12/22/2018    CHLORIDE 93 (L) 12/22/2018    CO2 34 (H) 12/22/2018    GLUCOSE 188 (H) 12/22/2018    BUN 28 (H) 12/22/2018    CREATININE 6.16 (HH)  12/22/2018        Lab Results   Component Value Date    WBC 3.0 (L) 12/22/2018    HEMOGLOBIN 10.2 (L) 12/22/2018    HEMATOCRIT 31.2 (L) 12/22/2018    PLATELETCT 126 (L) 12/22/2018      NM-BILIARY HIDA SCAN FATTY MEAL   Final Result      Normal hepatobiliary scan. Gallbladder EF = 84%. No clinical symptoms noted during fatty meal ingestion.      This study was obtained utilizing fatty meal challenge to induce gallbladder contraction due to national shortage of cholecystokinin.  There are no national standards for gallbladder ejection fraction calculated utilizing fatty meal challenge.  An    ejection fraction greater than 35% is most likely normal.  Gallbladder ejection fraction less than 35% may be abnormal but could be falsely positive.  Therefore, this test could be falsely positive.  Consider repeat imaging once cholecystokinin becomes    available.      EC-ECHOCARDIOGRAM COMPLETE W/O CONT   Final Result      US-RUQ   Final Result      1. No gallstone is seen. However there is gallbladder wall thickening and trace pericholecystic fluid. The finding is concerning for cholecystitis. HIDA scan could be helpful for confirmation.      2. Small right pleural effusion.      3. Nonspecific mildly prominent IVC and hepatic veins. This can sometimes be seen with heart failure.               Total time of the discharge process exceeds 46 minutes.

## 2019-01-03 ENCOUNTER — OFFICE VISIT (OUTPATIENT)
Dept: MEDICAL GROUP | Facility: PHYSICIAN GROUP | Age: 69
End: 2019-01-03
Payer: MEDICARE

## 2019-01-03 VITALS
BODY MASS INDEX: 23.98 KG/M2 | HEIGHT: 61 IN | WEIGHT: 127 LBS | DIASTOLIC BLOOD PRESSURE: 70 MMHG | OXYGEN SATURATION: 95 % | SYSTOLIC BLOOD PRESSURE: 120 MMHG | TEMPERATURE: 97 F | HEART RATE: 60 BPM | RESPIRATION RATE: 16 BRPM

## 2019-01-03 DIAGNOSIS — N18.6 END STAGE RENAL FAILURE ON DIALYSIS (HCC): ICD-10-CM

## 2019-01-03 DIAGNOSIS — I10 HYPERTENSION, UNSPECIFIED TYPE: ICD-10-CM

## 2019-01-03 DIAGNOSIS — Z99.2 END STAGE RENAL FAILURE ON DIALYSIS (HCC): ICD-10-CM

## 2019-01-03 DIAGNOSIS — R10.13 EPIGASTRIC PAIN: ICD-10-CM

## 2019-01-03 DIAGNOSIS — I10 ESSENTIAL HYPERTENSION: ICD-10-CM

## 2019-01-03 PROCEDURE — 99214 OFFICE O/P EST MOD 30 MIN: CPT | Performed by: NURSE PRACTITIONER

## 2019-01-03 RX ORDER — AMLODIPINE BESYLATE 10 MG/1
10 TABLET ORAL DAILY
Qty: 90 TAB | Refills: 3 | Status: SHIPPED | OUTPATIENT
Start: 2019-01-03 | End: 2019-04-24 | Stop reason: SDUPTHER

## 2019-01-03 RX ORDER — METOPROLOL TARTRATE 100 MG/1
100 TABLET ORAL 2 TIMES DAILY
Qty: 180 TAB | Refills: 3 | Status: SHIPPED | OUTPATIENT
Start: 2019-01-03 | End: 2019-02-23

## 2019-01-03 RX ORDER — LISINOPRIL 20 MG/1
40 TABLET ORAL DAILY
Qty: 90 TAB | Refills: 3 | Status: SHIPPED | OUTPATIENT
Start: 2019-01-03 | End: 2019-01-03 | Stop reason: SDUPTHER

## 2019-01-03 RX ORDER — LISINOPRIL 20 MG/1
40 TABLET ORAL 2 TIMES DAILY
Qty: 180 TAB | Refills: 3 | Status: SHIPPED | OUTPATIENT
Start: 2019-01-03 | End: 2019-09-03 | Stop reason: SDUPTHER

## 2019-01-03 ASSESSMENT — PATIENT HEALTH QUESTIONNAIRE - PHQ9: CLINICAL INTERPRETATION OF PHQ2 SCORE: 0

## 2019-01-03 NOTE — PROGRESS NOTES
Chief Complaint   Patient presents with   • Hospital Follow-up       HISTORY OF PRESENT ILLNESS: Patient is a 68 y.o. female established patient who presents today to discuss the following issues:    Abdominal pain  Was recently admitted to the hospital.  She presented to the ER with epigastric pain, but was kept due to extremely elevated blood pressure.  Her abdominal work up was negative, and she reports that her pain has resolved completely.    Essential hypertension  She was admitted recently for uncontrolled hypertension.  Her medications were changed, and she reports that she is feeling much better since discharge.  Three seems to be a little bit of an issue with her remembering to take her pills.  Her daughters will get her a pill organizer and help reminder her to take them as directed.  Patient and daughters verbalized understanding.    End stage renal failure on dialysis (CMS-[facility]) ([facility])  Patient and daughter's report that she has been compliant with dialysis.  We had a long discussion regarding the importance of compliance with all of her medical care since she is on the transplant list.      Patient Active Problem List    Diagnosis Date Noted   • Abdominal pain 12/23/2018     Priority: High   • Hypertensive urgency 12/22/2018     Priority: High   • Transaminitis 12/22/2018   • LUQ pain 11/12/2018   • No appetite 01/22/2018   • Acute hypoxemic respiratory failure ([facility]) 10/17/2017   • ESRD (end stage renal disease) ([facility]) 10/16/2017   • Acute on chronic diastolic CHF (congestive heart failure) ([facility]) 10/16/2017   • Chronic total occlusion of native coronary artery 09/07/2016   • S/P coronary artery stent placement 09/07/2016   • Renal hypertension 08/05/2016   • End stage renal failure on dialysis ([facility]) 08/05/2016   • Type 2 diabetes mellitus ([facility]) 08/05/2016   • Kidney transplant candidate    • Abnormal cardiovascular stress test    • Essential hypertension 09/17/2013       Allergies:Patient has no known  allergies.    Current Outpatient Prescriptions   Medication Sig Dispense Refill   • amLODIPine (NORVASC) 10 MG Tab Take 1 Tab by mouth every day. 90 Tab 3   • metoprolol (LOPRESSOR) 100 MG Tab Take 1 Tab by mouth 2 times a day. 180 Tab 3   • lisinopril (PRINIVIL) 20 MG Tab Take 2 Tabs by mouth 2 times a day. 180 Tab 3   • famotidine (PEPCID) 40 MG Tab Take 1 Tab by mouth every day. 60 Tab 0   • aspirin (ASA) 81 MG Chew Tab chewable tablet Take 81 mg by mouth every day.     • furosemide (LASIX) 40 MG Tab Take 40 mg by mouth every day.     • hydrOXYzine HCl (ATARAX) 25 MG Tab Take 25 mg by mouth at bedtime as needed for Itching or Anxiety.     • ROPINIRole (REQUIP) 0.25 MG Tab Take 0.25 mg by mouth every bedtime.     • atorvastatin (LIPITOR) 20 MG Tab Take 20 mg by mouth every evening.     • calcium carbonate (TUMS) 500 MG Chew Tab Take 1 Tab by mouth every 12 hours as needed (Indigestion). 30 Tab 0     No current facility-administered medications for this visit.        Social History   Substance Use Topics   • Smoking status: Never Smoker   • Smokeless tobacco: Never Used   • Alcohol use No       Family Status   Relation Status   • Mo  at age 20+        ? CHF, had ascities   • Fa  at age 93        old age   • Sis  at age 68        diabetes   • Bro Alive   • Neg Hx (Not Specified)     Family History   Problem Relation Age of Onset   • Diabetes Sister    • Other Sister         liver disease   • Diabetes Brother    • Heart Disease Neg Hx        Review of Systems:   Constitutional: Negative for fever, chills, weight loss and malaise/fatigue.   HENT: Negative for ear pain, nosebleeds, congestion, sore throat and neck pain.    Eyes: Negative for blurred vision.   Respiratory: Negative for cough, sputum production, shortness of breath and wheezing.    Cardiovascular: Negative for chest pain, palpitations, orthopnea and leg swelling.   Gastrointestinal: Negative for heartburn, nausea, vomiting and  "abdominal pain.   Genitourinary: Negative for dysuria, urgency and frequency.   Musculoskeletal: Negative for myalgias, joint pain, and back pain.  Skin: Negative for rash and itching.   Neurological: Negative for dizziness, tingling, tremors, sensory change, focal weakness and headaches.   Endo/Heme/Allergies: Does not bruise/bleed easily.   Psychiatric/Behavioral: Negative for depression, suicidal ideas and memory loss.  The patient is not nervous/anxious and does not have insomnia.    All other systems reviewed and are negative except as in HPI.    Exam:  Blood pressure 120/70, pulse 60, temperature 36.1 °C (97 °F), resp. rate 16, height 1.549 m (5' 1\"), weight 57.6 kg (127 lb), SpO2 95 %, not currently breastfeeding.  General:  Well nourished, well developed female in NAD  Head: Grossly normal.  Neck: Supple without JVD or bruit. Thyroid is not enlarged.  Pulmonary: Clear to ausculation. Normal effort. No rales, ronchi, or wheezing.  Cardiovascular: Regular rate and rhythm without murmur.   Abdomen:  Soft, nontender, nondistended.  Extremities: No clubbing, cyanosis, or edema.  Skin: Intact with no obvious rashes or lesions.  Neuro: Grossly intact.  Psych: Alert and oriented x 3.  Mood and affect appropriate.    Medical decision-making and discussion: Tere is here today for follow-up of hospitalization.  Her hospiral records and test results were reviewed and discussed.  Her medications were refilled and the direction for taking them was discussed at length (as well as written on the bottles).  They will continue keeping a blood pressure log at home, and they will follow-up here in about 1 month.          Assessment/Plan:  1. Hypertension, unspecified type  amLODIPine (NORVASC) 10 MG Tab    metoprolol (LOPRESSOR) 100 MG Tab    lisinopril (PRINIVIL) 20 MG Tab    DISCONTINUED: lisinopril (PRINIVIL) 20 MG Tab   2. Epigastric pain     3. Essential hypertension     4. End stage renal failure on dialysis (HCC)   "       Return if symptoms worsen or fail to improve.    Please note that this dictation was created using voice recognition software. I have made every reasonable attempt to correct obvious errors, but I expect that there are errors of grammar and possibly content that I did not discover before finalizing the note.

## 2019-01-14 NOTE — ASSESSMENT & PLAN NOTE
Was recently admitted to the hospital.  She presented to the ER with epigastric pain, but was kept due to extremely elevated blood pressure.  Her abdominal work up was negative, and she reports that her pain has resolved completely.

## 2019-01-14 NOTE — ASSESSMENT & PLAN NOTE
Patient and daughter's report that she has been compliant with dialysis.  We had a long discussion regarding the importance of compliance with all of her medical care since she is on the transplant list.

## 2019-01-14 NOTE — ASSESSMENT & PLAN NOTE
She was admitted recently for uncontrolled hypertension.  Her medications were changed, and she reports that she is feeling much better since discharge.  Three seems to be a little bit of an issue with her remembering to take her pills.  Her daughters will get her a pill organizer and help reminder her to take them as directed.  Patient and daughters verbalized understanding.

## 2019-01-21 ENCOUNTER — HOSPITAL ENCOUNTER (OUTPATIENT)
Dept: RADIOLOGY | Facility: MEDICAL CENTER | Age: 69
End: 2019-01-21
Attending: INTERNAL MEDICINE
Payer: MEDICARE

## 2019-01-21 DIAGNOSIS — Z11.1 SCREENING EXAMINATION FOR PULMONARY TUBERCULOSIS: ICD-10-CM

## 2019-01-21 PROCEDURE — 71046 X-RAY EXAM CHEST 2 VIEWS: CPT

## 2019-02-23 ENCOUNTER — HOSPITAL ENCOUNTER (EMERGENCY)
Facility: MEDICAL CENTER | Age: 69
End: 2019-02-23
Attending: EMERGENCY MEDICINE
Payer: MEDICARE

## 2019-02-23 VITALS
HEIGHT: 61 IN | OXYGEN SATURATION: 99 % | DIASTOLIC BLOOD PRESSURE: 72 MMHG | HEART RATE: 75 BPM | WEIGHT: 123.46 LBS | TEMPERATURE: 98.4 F | SYSTOLIC BLOOD PRESSURE: 141 MMHG | BODY MASS INDEX: 23.31 KG/M2 | RESPIRATION RATE: 18 BRPM

## 2019-02-23 DIAGNOSIS — L29.9 PRURITUS: ICD-10-CM

## 2019-02-23 PROCEDURE — 99284 EMERGENCY DEPT VISIT MOD MDM: CPT

## 2019-02-23 PROCEDURE — 700102 HCHG RX REV CODE 250 W/ 637 OVERRIDE(OP): Performed by: EMERGENCY MEDICINE

## 2019-02-23 PROCEDURE — A9270 NON-COVERED ITEM OR SERVICE: HCPCS | Performed by: EMERGENCY MEDICINE

## 2019-02-23 RX ORDER — DIPHENHYDRAMINE HCL 25 MG
25 TABLET ORAL ONCE
Status: COMPLETED | OUTPATIENT
Start: 2019-02-23 | End: 2019-02-23

## 2019-02-23 RX ORDER — HYDROXYZINE HYDROCHLORIDE 25 MG/1
12.5 TABLET, FILM COATED ORAL 3 TIMES DAILY PRN
Qty: 30 TAB | Refills: 0 | Status: SHIPPED | OUTPATIENT
Start: 2019-02-23 | End: 2019-03-13

## 2019-02-23 RX ADMIN — DIPHENHYDRAMINE HCL 25 MG: 25 TABLET ORAL at 21:28

## 2019-02-24 NOTE — ED NOTES
Pt given d/c paperwork and prescription, pt verbalized understanding all information given. Pt ambulated out of the ER w/o difficulty w/ family

## 2019-02-24 NOTE — ED TRIAGE NOTES
".  Chief Complaint   Patient presents with   • Itching     pt has diffuse whole body itching since last night, no medications taken     ./71   Pulse 79   Temp 36.9 °C (98.4 °F) (Temporal)   Resp 18   Ht 1.549 m (5' 1\")   Wt 56 kg (123 lb 7.3 oz)   LMP  (LMP Unknown)   SpO2 97%   BMI 23.33 kg/m²     "

## 2019-02-24 NOTE — ED PROVIDER NOTES
ED Provider Note    CHIEF COMPLAINT   Chief Complaint   Patient presents with   • Itching     pt has diffuse whole body itching since last night, no medications taken       HPI   Tere Gallegos is a 68 y.o. female who presents with itching all over the body after dialysis yesterday.  It is worse at night.  Is gotten no relief comes in for evaluation.  She has no rash no fever no chills no nausea vomiting diarrhea no chest pain or shortness of breath all other systems are negative    REVIEW OF SYSTEMS   See HPI for further details. All other systems are negative.     PAST MEDICAL HISTORY   Past Medical History:   Diagnosis Date   • Abnormal cardiovascular stress test    • Blood clotting disorder (HCC)     with AVF   • CAD (coronary artery disease)    • Dental disorder     partial dentures- uppers   • Diabetes (HCC)     oral medication   • Dialysis patient (HCC)     [facility]   • High cholesterol    • Hyperlipidemia    • Hypertension    • Kidney disease     renal failure , on dialysis, M, W, F.   • Kidney transplant candidate        FAMILY HISTORY  Family History   Problem Relation Age of Onset   • Diabetes Sister    • Other Sister         liver disease   • Diabetes Brother    • Heart Disease Neg Hx        SOCIAL HISTORY  Social History     Social History   • Marital status:      Spouse name: N/A   • Number of children: N/A   • Years of education: N/A     Social History Main Topics   • Smoking status: Never Smoker   • Smokeless tobacco: Never Used   • Alcohol use No   • Drug use: No   • Sexual activity: No     Other Topics Concern   • Not on file     Social History Narrative   • No narrative on file        SURGICAL HISTORY  Past Surgical History:   Procedure Laterality Date   • CARDIAC CATH  9/7/2016    RCA stented with 2 Synergy drug-eluting stents.   • RECOVERY  8/16/2016    Procedure: CATH LAB [facility] WITH POSSIBLE DR. CASTILLO;  Surgeon: Recoveryonly Surgery;  Location: SURGERY  [facility]  Service:    • CARDIAC CATH  8/16/16    100% RCA   • OTHER Left 2014    left arm upper extremity fistula   • OTHER ABDOMINAL SURGERY      left kidney removed due to cancer       CURRENT MEDICATIONS   Home Medications     Reviewed by Brenda Bowden R.N. (Registered Nurse) on 02/23/19 at 2026  Med List Status: Partial   Medication Last Dose Status   amLODIPine (NORVASC) 10 MG Tab 2/23/2019 Active   aspirin (ASA) 81 MG Chew Tab chewable tablet 2/23/2019 Active   calcium carbonate (TUMS) 500 MG Chew Tab prn Active   famotidine (PEPCID) 40 MG Tab 2/23/2019 Active   furosemide (LASIX) 40 MG Tab 2/23/2019 Active   hydrOXYzine HCl (ATARAX) 25 MG Tab 2/22/2019 Active   lisinopril (PRINIVIL) 20 MG Tab 2/23/2019 Active                ALLERGIES   No Known Allergies    PHYSICAL EXAM  VITAL SIGNS: /71   Pulse 79   Temp 36.9 °C (98.4 °F) (Temporal)   Resp 18   Ht 1.549 m (5' 1\")   Wt 56 kg (123 lb 7.3 oz)   LMP  (LMP Unknown)   SpO2 97%   BMI 23.33 kg/m²    Constitutional: Patient is alert and oriented x3 in no distress   Cardiovascular: Normal heart rate, Normal rhythm, No murmurs, No rubs, No gallops.   Thorax & Lungs: Normal breath sounds, No respiratory distress, No wheezing  Skin: No rashes noted.  Extremities: No joint swelling AV fistula has good thrill          COURSE & MEDICAL DECISION MAKING  Pertinent Labs & Imaging studies reviewed. (See chart for details)  Patient has pruritus after dialysis.  It is worse at night.  She also has no dark urine she does make a small amount of urine.  I do not think lab is needed.  I think she has pruritus from chronic renal failure.  I do not think further laboratory work indicated she is not missed any dialysis I am referring her to her nephrologist    FINAL IMPRESSION  1.  Pruritus  2.   3.      Electronically signed by: Rodney Ch, 2/23/2019 9:26 PM    "

## 2019-02-26 ENCOUNTER — OFFICE VISIT (OUTPATIENT)
Dept: MEDICAL GROUP | Facility: PHYSICIAN GROUP | Age: 69
End: 2019-02-26
Payer: MEDICARE

## 2019-02-26 VITALS
BODY MASS INDEX: 23.75 KG/M2 | TEMPERATURE: 97.2 F | DIASTOLIC BLOOD PRESSURE: 88 MMHG | SYSTOLIC BLOOD PRESSURE: 150 MMHG | HEART RATE: 80 BPM | HEIGHT: 60 IN | OXYGEN SATURATION: 96 % | WEIGHT: 121 LBS

## 2019-02-26 DIAGNOSIS — L29.9 PRURITUS: ICD-10-CM

## 2019-02-26 DIAGNOSIS — Z99.2 ESRD (END STAGE RENAL DISEASE) ON DIALYSIS (HCC): ICD-10-CM

## 2019-02-26 DIAGNOSIS — N18.6 ESRD (END STAGE RENAL DISEASE) ON DIALYSIS (HCC): ICD-10-CM

## 2019-02-26 PROCEDURE — 99213 OFFICE O/P EST LOW 20 MIN: CPT | Performed by: NURSE PRACTITIONER

## 2019-02-26 RX ORDER — ATORVASTATIN CALCIUM 20 MG/1
20 TABLET, FILM COATED ORAL NIGHTLY
COMMUNITY
End: 2019-09-03 | Stop reason: SDUPTHER

## 2019-02-26 RX ORDER — NORTRIPTYLINE HYDROCHLORIDE 10 MG/1
10 CAPSULE ORAL
Qty: 7 CAP | Refills: 0 | Status: SHIPPED | OUTPATIENT
Start: 2019-02-26 | End: 2019-07-24

## 2019-02-26 RX ORDER — METOPROLOL TARTRATE 100 MG/1
100 TABLET ORAL 2 TIMES DAILY
COMMUNITY
End: 2019-09-03 | Stop reason: SDUPTHER

## 2019-02-26 RX ORDER — PREDNISONE 10 MG/1
10 TABLET ORAL DAILY
Qty: 5 TAB | Refills: 0 | Status: SHIPPED | OUTPATIENT
Start: 2019-02-26 | End: 2019-07-24

## 2019-02-26 NOTE — PROGRESS NOTES
Subjective:     Chief Complaint   Patient presents with   • Itching     whole body times one week        HPI  Tere Gallegos is a 68 y.o. female here today for pruritis. Urdu speaking only therefore  was used. Son in room today as well. This is a new problem to me that started 5 days ago. Was seen in ER 2/23 for this problem. She has itching across her body that started right after dialysis that day. Itching is whole body on scalp, legs, arms, torso, sparing palms of hands and feet. States there were no new medications taken prior to dialysis, but she is unsure about if anything new was used during dialysis. ER doc assumed the pruritis was from her CKD and was prescribed hydroxyzine, which has not been helpful. Dialysis yesterday gave her a cream, which has helped mildly and temporarily.   Itching persists, without improvement overall. She has no itching mouth or tongue, tightening throat, sob, cough, wheezing, F/C, fatigue, or malaise. She has not had labs drawn in our system within the past 6 weeks. We should get these today.  No rash she has been able to scratch and has created some erythema at the skin       Diagnoses of Pruritus and ESRD (end stage renal disease) on dialysis ([facility]) were pertinent to this visit.    Allergies: Patient has no known allergies.  Current medicines (including changes today)  Current Outpatient Prescriptions   Medication Sig Dispense Refill   • metoprolol (LOPRESSOR) 100 MG Tab Take 100 mg by mouth 2 times a day.     • atorvastatin (LIPITOR) 20 MG Tab Take 20 mg by mouth every evening.     • nortriptyline (PAMELOR) 10 MG Cap Take 1 Cap by mouth every bedtime. For 1 week 7 Cap 0   • predniSONE (DELTASONE) 10 MG Tab Take 1 Tab by mouth every day. 5 Tab 0   • hydrOXYzine HCl (ATARAX) 25 MG Tab Take 0.5 Tabs by mouth 3 times a day as needed for Itching. 30 Tab 0   • amLODIPine (NORVASC) 10 MG Tab Take 1 Tab by mouth every day. 90 Tab 3   • lisinopril (PRINIVIL) 20  MG Tab Take 2 Tabs by mouth 2 times a day. 180 Tab 3   • famotidine (PEPCID) 40 MG Tab Take 1 Tab by mouth every day. 60 Tab 0   • aspirin (ASA) 81 MG Chew Tab chewable tablet Take 81 mg by mouth every day.     • furosemide (LASIX) 40 MG Tab Take 40 mg by mouth every day.     • calcium carbonate (TUMS) 500 MG Chew Tab Take 1 Tab by mouth every 12 hours as needed (Indigestion). 30 Tab 0     No current facility-administered medications for this visit.        She  has a past medical history of Abnormal cardiovascular stress test; Blood clotting disorder ([facility]); CAD (coronary artery disease); Dental disorder; Diabetes ([facility]); Dialysis patient ([facility]); High cholesterol; Hyperlipidemia; Hypertension; Kidney disease; and Kidney transplant candidate.        ROS  As stated in HPI      Objective:     Blood pressure 150/88, pulse 80, temperature 36.2 °C (97.2 °F), temperature source Temporal, height 1.524 m (5'), weight 54.9 kg (121 lb), SpO2 96 %, not currently breastfeeding. Body mass index is 23.63 kg/m².  Physical Exam:  General: Alert, oriented, in no acute distress.  Eye contact is good, speech goal directed, affect calm  CNs grossly intact.  HEENT: conjunctiva non-injected, sclera non-icteric, EOMs intact  Gross hearing intact.  Oral mucous membranes pink and moist with no lesions or ulcers. Oropharynx without erythema, or exudate.   Ext: no edema, normal color and temperature.   Skin: No rashes or lesions in visible areas. Skin dry on dorsal aspect of UE and LE  Gait steady.     Assessment and Plan:   Assessment/Plan:  1. Pruritus  Etiology not clear. Something triggered this during dialysis. Unsure if new medication used that day. Start prednisone and nortriptyline for symptom management only. Check labs at dialysis tomorrow. F/u with PCP in one week   - nortriptyline (PAMELOR) 10 MG Cap; Take 1 Cap by mouth every bedtime. For 1 week  Dispense: 7 Cap; Refill: 0  - CBC WITH DIFFERENTIAL; Future  - Comp Metabolic Panel;  Future  - predniSONE (DELTASONE) 10 MG Tab; Take 1 Tab by mouth every day.  Dispense: 5 Tab; Refill: 0  - TSH WITH REFLEX TO FT4; Future    2. ESRD (end stage renal disease) on dialysis (HCC)  - CBC WITH DIFFERENTIAL; Future  - Comp Metabolic Panel; Future       Follow up:  Return in about 1 week (around 3/5/2019), or w/PCP.    Educated in proper administration of medication(s) ordered today including safety, possible SE, risks, benefits, rationale and alternatives to therapy.   Supportive care, differential diagnoses, and indications for immediate follow-up discussed with patient.    Pathogenesis of diagnosis discussed including typical length and natural progression.    Instructed to return to clinic or nearest emergency department for any change in condition, further concerns, or worsening of symptoms.  Patient states understanding of the plan of care and discharge instructions.      Please note that this dictation was created using voice recognition software. I have made every reasonable attempt to correct obvious errors, but I expect that there are errors of grammar and possibly content that I did not discover before finalizing the note.    Followup: Return in about 1 week (around 3/5/2019), or w/PCP. sooner should new symptoms or problems arise.

## 2019-03-04 DIAGNOSIS — I10 ESSENTIAL HYPERTENSION: ICD-10-CM

## 2019-03-04 DIAGNOSIS — R94.39 ABNORMAL CARDIOVASCULAR STRESS TEST: Chronic | ICD-10-CM

## 2019-03-05 ENCOUNTER — HOSPITAL ENCOUNTER (OUTPATIENT)
Dept: LAB | Facility: MEDICAL CENTER | Age: 69
End: 2019-03-05
Attending: NURSE PRACTITIONER
Payer: MEDICARE

## 2019-03-05 DIAGNOSIS — L29.9 PRURITUS: ICD-10-CM

## 2019-03-05 DIAGNOSIS — N18.6 ESRD (END STAGE RENAL DISEASE) ON DIALYSIS (HCC): ICD-10-CM

## 2019-03-05 DIAGNOSIS — Z99.2 ESRD (END STAGE RENAL DISEASE) ON DIALYSIS (HCC): ICD-10-CM

## 2019-03-05 LAB
ALBUMIN SERPL BCP-MCNC: 4 G/DL (ref 3.2–4.9)
ALBUMIN/GLOB SERPL: 1.3 G/DL
ALP SERPL-CCNC: 111 U/L (ref 30–99)
ALT SERPL-CCNC: 28 U/L (ref 2–50)
ANION GAP SERPL CALC-SCNC: 10 MMOL/L (ref 0–11.9)
AST SERPL-CCNC: 24 U/L (ref 12–45)
BASOPHILS # BLD AUTO: 1 % (ref 0–1.8)
BASOPHILS # BLD: 0.04 K/UL (ref 0–0.12)
BILIRUB SERPL-MCNC: 0.5 MG/DL (ref 0.1–1.5)
BUN SERPL-MCNC: 35 MG/DL (ref 8–22)
CALCIUM SERPL-MCNC: 9.6 MG/DL (ref 8.5–10.5)
CHLORIDE SERPL-SCNC: 94 MMOL/L (ref 96–112)
CO2 SERPL-SCNC: 32 MMOL/L (ref 20–33)
CREAT SERPL-MCNC: 5.68 MG/DL (ref 0.5–1.4)
EOSINOPHIL # BLD AUTO: 0.2 K/UL (ref 0–0.51)
EOSINOPHIL NFR BLD: 5.2 % (ref 0–6.9)
ERYTHROCYTE [DISTWIDTH] IN BLOOD BY AUTOMATED COUNT: 51.4 FL (ref 35.9–50)
FASTING STATUS PATIENT QL REPORTED: NORMAL
GLOBULIN SER CALC-MCNC: 3.1 G/DL (ref 1.9–3.5)
GLUCOSE SERPL-MCNC: 107 MG/DL (ref 65–99)
HCT VFR BLD AUTO: 36.6 % (ref 37–47)
HGB BLD-MCNC: 11.6 G/DL (ref 12–16)
IMM GRANULOCYTES # BLD AUTO: 0.02 K/UL (ref 0–0.11)
IMM GRANULOCYTES NFR BLD AUTO: 0.5 % (ref 0–0.9)
LYMPHOCYTES # BLD AUTO: 1.04 K/UL (ref 1–4.8)
LYMPHOCYTES NFR BLD: 26.9 % (ref 22–41)
MCH RBC QN AUTO: 28.4 PG (ref 27–33)
MCHC RBC AUTO-ENTMCNC: 31.7 G/DL (ref 33.6–35)
MCV RBC AUTO: 89.7 FL (ref 81.4–97.8)
MONOCYTES # BLD AUTO: 0.39 K/UL (ref 0–0.85)
MONOCYTES NFR BLD AUTO: 10.1 % (ref 0–13.4)
NEUTROPHILS # BLD AUTO: 2.18 K/UL (ref 2–7.15)
NEUTROPHILS NFR BLD: 56.3 % (ref 44–72)
NRBC # BLD AUTO: 0 K/UL
NRBC BLD-RTO: 0 /100 WBC
PLATELET # BLD AUTO: 185 K/UL (ref 164–446)
PMV BLD AUTO: 11.1 FL (ref 9–12.9)
POTASSIUM SERPL-SCNC: 4 MMOL/L (ref 3.6–5.5)
PROT SERPL-MCNC: 7.1 G/DL (ref 6–8.2)
RBC # BLD AUTO: 4.08 M/UL (ref 4.2–5.4)
SODIUM SERPL-SCNC: 136 MMOL/L (ref 135–145)
T4 FREE SERPL-MCNC: 1.02 NG/DL (ref 0.53–1.43)
TSH SERPL DL<=0.005 MIU/L-ACNC: 6.42 UIU/ML (ref 0.38–5.33)
WBC # BLD AUTO: 3.9 K/UL (ref 4.8–10.8)

## 2019-03-05 PROCEDURE — 85025 COMPLETE CBC W/AUTO DIFF WBC: CPT

## 2019-03-05 PROCEDURE — 80053 COMPREHEN METABOLIC PANEL: CPT

## 2019-03-05 PROCEDURE — 84439 ASSAY OF FREE THYROXINE: CPT

## 2019-03-05 PROCEDURE — 36415 COLL VENOUS BLD VENIPUNCTURE: CPT

## 2019-03-05 PROCEDURE — 84443 ASSAY THYROID STIM HORMONE: CPT

## 2019-03-05 RX ORDER — LORATADINE 10 MG
TABLET ORAL
Qty: 90 TAB | Refills: 3 | Status: SHIPPED | OUTPATIENT
Start: 2019-03-05 | End: 2020-01-22

## 2019-03-06 ENCOUNTER — TELEPHONE (OUTPATIENT)
Dept: MEDICAL GROUP | Facility: PHYSICIAN GROUP | Age: 69
End: 2019-03-06

## 2019-03-12 RX ORDER — ROPINIROLE 0.25 MG/1
TABLET, FILM COATED ORAL
Qty: 90 TAB | Refills: 3 | Status: SHIPPED | OUTPATIENT
Start: 2019-03-12 | End: 2019-08-01

## 2019-03-13 RX ORDER — HYDROXYZINE HYDROCHLORIDE 25 MG/1
TABLET, FILM COATED ORAL
Qty: 30 TAB | Refills: 0 | Status: SHIPPED | OUTPATIENT
Start: 2019-03-13 | End: 2019-04-24 | Stop reason: SDUPTHER

## 2019-03-13 NOTE — TELEPHONE ENCOUNTER
Was the patient seen in the last year in this department? Yes    Does patient have an active prescription for medications requested? No     Received Request Via: Pharmacy      Pt met protocol?: Yes, ov pcp 1/19

## 2019-03-21 ENCOUNTER — OFFICE VISIT (OUTPATIENT)
Dept: MEDICAL GROUP | Facility: MEDICAL CENTER | Age: 69
End: 2019-03-21
Payer: MEDICARE

## 2019-03-21 VITALS
BODY MASS INDEX: 24.15 KG/M2 | SYSTOLIC BLOOD PRESSURE: 118 MMHG | DIASTOLIC BLOOD PRESSURE: 64 MMHG | WEIGHT: 123 LBS | OXYGEN SATURATION: 96 % | HEIGHT: 60 IN | TEMPERATURE: 98.7 F | HEART RATE: 66 BPM | RESPIRATION RATE: 16 BRPM

## 2019-03-21 DIAGNOSIS — E03.8 SUBCLINICAL HYPOTHYROIDISM: ICD-10-CM

## 2019-03-21 DIAGNOSIS — L29.9 ITCHING: ICD-10-CM

## 2019-03-21 PROCEDURE — 99214 OFFICE O/P EST MOD 30 MIN: CPT | Performed by: NURSE PRACTITIONER

## 2019-03-21 RX ORDER — LEVOTHYROXINE SODIUM 0.03 MG/1
25 TABLET ORAL
Qty: 30 TAB | Refills: 11 | Status: SHIPPED | OUTPATIENT
Start: 2019-03-21 | End: 2019-11-12

## 2019-03-21 NOTE — ASSESSMENT & PLAN NOTE
Itching all over body. Getting dry patches of skin. Ongoing for past 4 days. Denies new medications or new soaps/detergents.     She was seen in the ER for this on 2/23 and prescribed Hydroxyzine for this which has helped a little bit. Medication does not make her sleepy.     When looking through patient's already prescribed medications in a bag that was brought to the office, it appears that Dr. Burnett, her nephrologist, has prescribed her hydroxyzine 50 mg every 6 hours as needed for itching.  Patient states she did not realize she had this and has only been taking it once daily.

## 2019-03-21 NOTE — ASSESSMENT & PLAN NOTE
Has been having worsening dry skin for the past month with significant itching. Denies weight gain, hair loss. Is having some constipation and fatigue.

## 2019-03-22 NOTE — PROGRESS NOTES
Subjective:   Tere Gallegos is a 68 y.o. female here today for itching and dry skin:    Itching  Itching all over body. Getting dry patches of skin. Ongoing for past 4 days. Denies new medications or new soaps/detergents.     She was seen in the ER for this on 2/23 and prescribed Hydroxyzine for this which has helped a little bit. Medication does not make her sleepy.     When looking through patient's already prescribed medications in a bag that was brought to the office, it appears that Dr. Burnett, her nephrologist, has prescribed her hydroxyzine 50 mg every 6 hours as needed for itching.  Patient states she did not realize she had this and has only been taking it once daily.    Subclinical hypothyroidism  Has been having worsening dry skin for the past month with significant itching. Denies weight gain, hair loss. Is having some constipation and fatigue.        Current medicines (including changes today)  Current Outpatient Prescriptions   Medication Sig Dispense Refill   • levothyroxine (SYNTHROID) 25 MCG Tab Take 1 Tab by mouth Every morning on an empty stomach. 30 Tab 11   • hydrOXYzine HCl (ATARAX) 25 MG Tab TAKE 1 TABLET BY MOUTH EVERY DAY AT BEDTIME AS NEEDED 30 Tab 0   • ROPINIRole (REQUIP) 0.25 MG Tab TAKE 1 TABLET BY MOUTH AT BEDTIME FOR RESTLESS LEG SYNDROME 90 Tab 3   • [facility] ASPIRIN ADULT LOW DOSE 81 MG Chew Tab chewable tablet TOME FERNANDO TABLETA TODOS LOS MCCORMICK 90 Tab 3   • metoprolol (LOPRESSOR) 100 MG Tab Take 100 mg by mouth 2 times a day.     • atorvastatin (LIPITOR) 20 MG Tab Take 20 mg by mouth every evening.     • nortriptyline (PAMELOR) 10 MG Cap Take 1 Cap by mouth every bedtime. For 1 week 7 Cap 0   • predniSONE (DELTASONE) 10 MG Tab Take 1 Tab by mouth every day. 5 Tab 0   • amLODIPine (NORVASC) 10 MG Tab Take 1 Tab by mouth every day. 90 Tab 3   • lisinopril (PRINIVIL) 20 MG Tab Take 2 Tabs by mouth 2 times a day. 180 Tab 3   • calcium carbonate (TUMS) 500 MG Chew Tab Take 1 Tab  by mouth every 12 hours as needed (Indigestion). 30 Tab 0   • famotidine (PEPCID) 40 MG Tab Take 1 Tab by mouth every day. 60 Tab 0   • aspirin (ASA) 81 MG Chew Tab chewable tablet Take 81 mg by mouth every day.     • furosemide (LASIX) 40 MG Tab Take 40 mg by mouth every day.       No current facility-administered medications for this visit.      She  has a past medical history of Abnormal cardiovascular stress test; Blood clotting disorder ([facility]); CAD (coronary artery disease); Dental disorder; Diabetes ([facility]); Dialysis patient ([facility]); High cholesterol; Hyperlipidemia; Hypertension; Kidney disease; and Kidney transplant candidate.    ROS   No chest pain, no shortness of breath, no abdominal pain  Positive ROS as per HPI.  All other systems reviewed and are negative.     Objective:     Blood pressure 118/64, pulse 66, temperature 37.1 °C (98.7 °F), temperature source Temporal, resp. rate 16, height 1.524 m (5'), weight 55.8 kg (123 lb), SpO2 96 %, not currently breastfeeding. Body mass index is 24.02 kg/m².     Physical Exam:  Constitutional: Alert, no distress.  Skin: Warm, dry, flaky, good turgor, no rashes in visible areas.  Eye: Equal, round and reactive, conjunctiva clear, lids normal.  ENMT: Lips without lesions, good dentition  Neck: Trachea midline  Respiratory: Unlabored respiratory effort  Cardiovascular: Normal S1, S2, no murmur, bilateral lower extremity edema  Psych: Alert and oriented x3, normal affect and mood.      Assessment and Plan:   The following treatment plan was discussed    1. Itching  Unstable  Possibly multifactorial due to renal failure, dialysis, and hypothyroidism  Educated patient on hydroxyzine prescription, advised her to take this every 6 hours as needed.  Start low-dose levothyroxine for subclinical hypothyroidism and dry skin  Advised starting an emollient such as Aquaphor, CeraVe, or Cetaphil daily    2. Subclinical hypothyroidism  Unstable  Begin levothyroxine 25 mcg  daily  Recheck labs in 4 weeks  - levothyroxine (SYNTHROID) 25 MCG Tab; Take 1 Tab by mouth Every morning on an empty stomach.  Dispense: 30 Tab; Refill: 11  - TSH; Future  - FREE THYROXINE; Future      Followup: Return in about 4 weeks (around 4/18/2019) for Itching, Labs, thyroid.    I have placed the below orders and discussed them with an approved delegating provider. The MA is performing the below orders under the direction of Dr. Davila

## 2019-04-15 ENCOUNTER — HOSPITAL ENCOUNTER (OUTPATIENT)
Dept: LAB | Facility: MEDICAL CENTER | Age: 69
End: 2019-04-15
Attending: NURSE PRACTITIONER
Payer: MEDICARE

## 2019-04-15 DIAGNOSIS — E03.8 SUBCLINICAL HYPOTHYROIDISM: ICD-10-CM

## 2019-04-15 LAB
T4 FREE SERPL-MCNC: 1.1 NG/DL (ref 0.53–1.43)
TSH SERPL DL<=0.005 MIU/L-ACNC: 3.27 UIU/ML (ref 0.38–5.33)

## 2019-04-15 PROCEDURE — 84439 ASSAY OF FREE THYROXINE: CPT

## 2019-04-15 PROCEDURE — 36415 COLL VENOUS BLD VENIPUNCTURE: CPT

## 2019-04-15 PROCEDURE — 84443 ASSAY THYROID STIM HORMONE: CPT

## 2019-04-16 ENCOUNTER — TELEPHONE (OUTPATIENT)
Dept: MEDICAL GROUP | Facility: MEDICAL CENTER | Age: 69
End: 2019-04-16

## 2019-04-16 NOTE — TELEPHONE ENCOUNTER
----- Message from ANGELITA Concepcion sent at 4/15/2019  6:02 PM PDT -----  Please notify patient that her thyroid levels have normalized.    ANGELITA Concepcion

## 2019-04-16 NOTE — TELEPHONE ENCOUNTER
Phone Number Called: [phone] (home)       Message: Tried to contact patient, left message for patient to call [phone]      Left Message for patient to call back: N\A

## 2019-04-24 ENCOUNTER — OFFICE VISIT (OUTPATIENT)
Dept: MEDICAL GROUP | Facility: MEDICAL CENTER | Age: 69
End: 2019-04-24
Payer: MEDICARE

## 2019-04-24 VITALS
HEIGHT: 60 IN | WEIGHT: 120 LBS | RESPIRATION RATE: 16 BRPM | DIASTOLIC BLOOD PRESSURE: 68 MMHG | OXYGEN SATURATION: 95 % | HEART RATE: 62 BPM | BODY MASS INDEX: 23.56 KG/M2 | TEMPERATURE: 98.4 F | SYSTOLIC BLOOD PRESSURE: 112 MMHG

## 2019-04-24 DIAGNOSIS — Z99.2 END STAGE RENAL FAILURE ON DIALYSIS (HCC): ICD-10-CM

## 2019-04-24 DIAGNOSIS — L29.9 ITCHING: ICD-10-CM

## 2019-04-24 DIAGNOSIS — N18.6 END STAGE RENAL FAILURE ON DIALYSIS (HCC): ICD-10-CM

## 2019-04-24 DIAGNOSIS — I10 HYPERTENSION, UNSPECIFIED TYPE: ICD-10-CM

## 2019-04-24 DIAGNOSIS — Z12.31 SCREENING MAMMOGRAM, ENCOUNTER FOR: ICD-10-CM

## 2019-04-24 DIAGNOSIS — Z78.0 POSTMENOPAUSAL: ICD-10-CM

## 2019-04-24 DIAGNOSIS — N18.6 ESRD (END STAGE RENAL DISEASE) (HCC): ICD-10-CM

## 2019-04-24 PROCEDURE — 99214 OFFICE O/P EST MOD 30 MIN: CPT | Performed by: NURSE PRACTITIONER

## 2019-04-24 RX ORDER — HYDROXYZINE 50 MG/1
50 TABLET, FILM COATED ORAL EVERY 6 HOURS PRN
Qty: 60 TAB | Refills: 3 | Status: SHIPPED | OUTPATIENT
Start: 2019-04-24 | End: 2020-01-22

## 2019-04-24 RX ORDER — FOLIC ACID/VIT B COMPLEX AND C 0.8 MG
1 TABLET ORAL
Qty: 30 TAB | Refills: 6 | Status: SHIPPED | OUTPATIENT
Start: 2019-04-24 | End: 2019-07-24 | Stop reason: SDUPTHER

## 2019-04-24 RX ORDER — AMLODIPINE BESYLATE 10 MG/1
10 TABLET ORAL DAILY
Qty: 90 TAB | Refills: 3 | Status: SHIPPED | OUTPATIENT
Start: 2019-04-24 | End: 2019-09-03 | Stop reason: SDUPTHER

## 2019-04-24 NOTE — LETTER
On license of [facility]  Kathy Melgar A.P.R.N  [address]   [address]   [facility] [address]  Fax: [phone]   Authorization for Release/Disclosure of   Protected Health Information   Name: DIOR GALLEGOS : 1950 SSN: [id number]   Address: [address] [place] [address]  [place] [address] Phone:    [phone] (home)    I authorize the entity listed below to release/disclose the PHI below to:   On license of [facility]/ ANGELITA Concepcion   Provider or Entity Name:  [facility]    Address   City, [facility], [facility]   Phone:  [phone]    Fax:  [phone]   Reason for request: continuity of care   Information to be released:    [X] LAST COLONOSCOPY,  including any PATH REPORT and follow-up  [  ] LAST FIT/COLOGUARD RESULT [  ] LAST DEXA  [  ] LAST MAMMOGRAM  [  ] LAST PAP  [  ] LAST LABS [  ] RETINA EXAM REPORT  [  ] IMMUNIZATION RECORDS  [  ] Release all info      [  ] Check here and initial the line next to each item to release ALL health information INCLUDING  _____ Care and treatment for drug and / or alcohol abuse  _____ HIV testing, infection status, or AIDS  _____ Genetic Testing    DATES OF SERVICE OR TIME PERIOD TO BE DISCLOSED: _____________  I understand and acknowledge that:  * This Authorization may be revoked at any time by you in writing, except if your health information has already been used or disclosed.  * Your health information that will be used or disclosed as a result of you signing this authorization could be re-disclosed by the recipient. If this occurs, your re-disclosed health information may no longer be protected by State or Federal laws.  * You may refuse to sign this Authorization. Your refusal will not affect your ability to obtain treatment.  * This Authorization becomes effective upon signing and will  on (date) __________.      If no date is indicated, this Authorization will  one (1) year from the signature date.    Name: Dior Gallegos    Signature:   Date:          4/24/2019       PLEASE FAX REQUESTED RECORDS BACK TO: [phone]

## 2019-04-24 NOTE — NON-PROVIDER
"No chief complaint on file.      Patient is a 68 y.o. female who presents today for follow-up on thyroid labs and itching:     Interpretor Giovani [id number] used. Patients daughter and grand-daughter assisting with interpretation as well.     Lab follow-up   Patient was started on Levothyroxine about 1 month ago and repeated labs. Tolerating medication and taking every day as prescribed. Patient denies dry skin, constipation, clumps of hair falling out and, fatigue.    Itching  This has resolved. Has not taken hydroxyzine for >1 month. Is not using any emollients for skin. Requesting a refill for hydroxyzine in case she has the itching again.    ESRD  Patient gets dialysis MWF. Had dialysis this morning. Was told by dialysis that she needed to be started on \"renal vitamins\". Patient is unsure of name, but states was told to take it on the days she has on dialysis. Daughter requesting lists for dental and eye doctor for Medicaid as patient has never been seen by these doctors.       Patient Active Problem List    Diagnosis Date Noted   • Abdominal pain 12/23/2018     Priority: High   • Hypertensive urgency 12/22/2018     Priority: High   • Postmenopausal 04/24/2019   • Itching 03/21/2019   • Subclinical hypothyroidism 03/21/2019   • Transaminitis 12/22/2018   • LUQ pain 11/12/2018   • No appetite 01/22/2018   • Acute hypoxemic respiratory failure ([facility]) 10/17/2017   • ESRD (end stage renal disease) ([facility]) 10/16/2017   • Acute on chronic diastolic CHF (congestive heart failure) ([facility]) 10/16/2017   • Chronic total occlusion of native coronary artery 09/07/2016   • S/P coronary artery stent placement 09/07/2016   • Renal hypertension 08/05/2016   • End stage renal failure on dialysis ([facility]) 08/05/2016   • Type 2 diabetes mellitus ([facility]) 08/05/2016   • Kidney transplant candidate    • Abnormal cardiovascular stress test    • Essential hypertension 09/17/2013       Allergies:Patient has no known allergies.    Current Outpatient " Prescriptions Ordered in Epic   Medication Sig Dispense Refill   • amLODIPine (NORVASC) 10 MG Tab Take 1 Tab by mouth every day. 90 Tab 3   • hydrOXYzine HCl (ATARAX) 50 MG Tab Take 1 Tab by mouth every 6 hours as needed for Itching. 60 Tab 3   • B Complex-C-Folic Acid (RENAL-SALVATORE) 0.8 MG Tab Take 1 Tab by mouth 1 time daily as needed (before dialysis). 30 Tab 6   • levothyroxine (SYNTHROID) 25 MCG Tab Take 1 Tab by mouth Every morning on an empty stomach. 30 Tab 11   • ROPINIRole (REQUIP) 0.25 MG Tab TAKE 1 TABLET BY MOUTH AT BEDTIME FOR RESTLESS LEG SYNDROME 90 Tab 3   • [facility]S ASPIRIN ADULT LOW DOSE 81 MG Chew Tab chewable tablet TOME FERNANDO TABLETA TODOS LOS MCCORMICK 90 Tab 3   • metoprolol (LOPRESSOR) 100 MG Tab Take 100 mg by mouth 2 times a day.     • atorvastatin (LIPITOR) 20 MG Tab Take 20 mg by mouth every evening.     • nortriptyline (PAMELOR) 10 MG Cap Take 1 Cap by mouth every bedtime. For 1 week 7 Cap 0   • predniSONE (DELTASONE) 10 MG Tab Take 1 Tab by mouth every day. 5 Tab 0   • lisinopril (PRINIVIL) 20 MG Tab Take 2 Tabs by mouth 2 times a day. 180 Tab 3   • calcium carbonate (TUMS) 500 MG Chew Tab Take 1 Tab by mouth every 12 hours as needed (Indigestion). 30 Tab 0   • famotidine (PEPCID) 40 MG Tab Take 1 Tab by mouth every day. 60 Tab 0   • aspirin (ASA) 81 MG Chew Tab chewable tablet Take 81 mg by mouth every day.     • furosemide (LASIX) 40 MG Tab Take 40 mg by mouth every day.       No current [facility]-ordered facility-administered medications on file.        Past Medical History:   Diagnosis Date   • Abnormal cardiovascular stress test    • Blood clotting disorder (HCC)     with AVF   • CAD (coronary artery disease)    • Dental disorder     partial dentures- uppers   • Diabetes (HCC)     oral medication   • Dialysis patient (HCC)     [facility]   • High cholesterol    • Hyperlipidemia    • Hypertension    • Kidney disease     renal failure , on dialysis, M, W, F.   • Kidney transplant  candidate        Social History   Substance Use Topics   • Smoking status: Never Smoker   • Smokeless tobacco: Never Used   • Alcohol use No       Family Status   Relation Status   • Mo  at age 20+        ? CHF, had ascities   • Fa  at age 93        old age   • Sis  at age 68        diabetes   • Bro Alive   • Neg Hx (Not Specified)     Family History   Problem Relation Age of Onset   • Diabetes Sister    • Other Sister         liver disease   • Diabetes Brother    • Heart Disease Neg Hx        ROS:  Review of Systems   No chest pain, no shortness of breath, no abdominal pain  Positive ROS as per HPI.  All other systems reviewed and are negative.       Exam:  /68 (BP Location: Right arm, Patient Position: Sitting, BP Cuff Size: Adult)   Pulse 62   Temp 36.9 °C (98.4 °F) (Temporal)   Resp 16   Ht 1.524 m (5')   Wt 54.4 kg (120 lb)   SpO2 95%      General: well-nourished, well-developed female in NAD  Eyes: Pupils round and equal, lids normal.  Neck: no masses, no tracheal deviation. No thyroid nodules, masses, or enlargement.  .   Lymph: no cervical adenopathy. No supraclavicular adenopathy.   Neuro: alert and oriented x 3, Stateless-speaking, interpretor in use.   Cardiovascular: regular rate and rhythm. Normal S1 and S2. Trace edema to bilateral LE.  Pulmonary: No distress. Chest is symmetrical with respiration, no wheezes, crackles, or rhonchi.   Skin: warm, dry, intact, no clubbing, no cyanosis, no rashes.   Psych: appropriate mood, affect, judgement.         Assessment/Plan:  1. Hypertension, unspecified type  amLODIPine (NORVASC) 10 MG Tab   2. Itching  hydrOXYzine HCl (ATARAX) 50 MG Tab  Continue hydroxyzine as needed.   Recommend using emollients daily for skin.    3. ESRD (end stage renal disease) (HCC)  hydrOXYzine HCl (ATARAX) 50 MG Tab   4. Screening mammogram, encounter for  MA-SCREEN MAMMO W/CAD-BILAT   5. Postmenopausal  DS-BONE DENSITY STUDY (DEXA)   6. End stage renal  failure on dialysis (HCC)  B Complex-C-Folic Acid (RENAL-SALVATORE) 0.8 MG Tab  Resources provided for Medicaid dental and ophthalmology providers         Supportive care, differential diagnoses, and indications for immediate follow-up discussed with patient, patients daughter and granddaughter.   Pathogenesis of diagnosis discussed including typical length and natural progression.   Instructed to return to clinic or nearest emergency department for any change in condition, further concerns, or worsening of symptoms.  Patient states understanding of the plan of care and discharge instructions.  Instructed to follow up in 3 months.     Alyce Flores RN, BSN, A.P.R.N. student

## 2019-04-25 NOTE — PROGRESS NOTES
I have seen and examined the patient independently. All new labs and imaging studies were reviewed. Case was discussed in detail with the nurse practitioner student and plan of care formulated.  I agree with treatment plan.    MALGORZATA Concepcion.

## 2019-05-31 ENCOUNTER — HOSPITAL ENCOUNTER (OUTPATIENT)
Dept: LAB | Facility: MEDICAL CENTER | Age: 69
End: 2019-05-31
Attending: INTERNAL MEDICINE
Payer: MEDICARE

## 2019-05-31 LAB
ABO GROUP BLD: NORMAL
RH BLD: NORMAL

## 2019-05-31 PROCEDURE — 86901 BLOOD TYPING SEROLOGIC RH(D): CPT

## 2019-05-31 PROCEDURE — 86900 BLOOD TYPING SEROLOGIC ABO: CPT

## 2019-05-31 PROCEDURE — 36415 COLL VENOUS BLD VENIPUNCTURE: CPT

## 2019-06-24 ENCOUNTER — HOSPITAL ENCOUNTER (OUTPATIENT)
Dept: LAB | Facility: MEDICAL CENTER | Age: 69
End: 2019-06-24
Attending: INTERNAL MEDICINE
Payer: MEDICARE

## 2019-06-24 LAB — HIV 1+2 AB+HIV1 P24 AG SERPL QL IA: NON REACTIVE

## 2019-06-24 PROCEDURE — G0475 HIV COMBINATION ASSAY: HCPCS | Mod: GA

## 2019-06-24 PROCEDURE — 36415 COLL VENOUS BLD VENIPUNCTURE: CPT | Mod: GA

## 2019-07-24 ENCOUNTER — HOSPITAL ENCOUNTER (OUTPATIENT)
Dept: LAB | Facility: MEDICAL CENTER | Age: 69
End: 2019-07-24
Attending: NURSE PRACTITIONER
Payer: MEDICARE

## 2019-07-24 ENCOUNTER — OFFICE VISIT (OUTPATIENT)
Dept: MEDICAL GROUP | Facility: MEDICAL CENTER | Age: 69
End: 2019-07-24
Payer: MEDICARE

## 2019-07-24 VITALS
OXYGEN SATURATION: 95 % | HEIGHT: 60 IN | HEART RATE: 60 BPM | BODY MASS INDEX: 24.94 KG/M2 | TEMPERATURE: 98 F | DIASTOLIC BLOOD PRESSURE: 60 MMHG | WEIGHT: 127 LBS | SYSTOLIC BLOOD PRESSURE: 120 MMHG

## 2019-07-24 DIAGNOSIS — N18.6 END STAGE RENAL DISEASE (HCC): ICD-10-CM

## 2019-07-24 DIAGNOSIS — Z99.2 END STAGE RENAL FAILURE ON DIALYSIS (HCC): ICD-10-CM

## 2019-07-24 DIAGNOSIS — R19.5 GREEN STOOL: ICD-10-CM

## 2019-07-24 DIAGNOSIS — N18.6 END STAGE RENAL FAILURE ON DIALYSIS (HCC): ICD-10-CM

## 2019-07-24 DIAGNOSIS — G25.81 RLS (RESTLESS LEGS SYNDROME): ICD-10-CM

## 2019-07-24 DIAGNOSIS — E11.21 TYPE 2 DIABETES MELLITUS WITH DIABETIC NEPHROPATHY, UNSPECIFIED WHETHER LONG TERM INSULIN USE (HCC): ICD-10-CM

## 2019-07-24 PROCEDURE — 83550 IRON BINDING TEST: CPT

## 2019-07-24 PROCEDURE — 83540 ASSAY OF IRON: CPT

## 2019-07-24 PROCEDURE — 82728 ASSAY OF FERRITIN: CPT

## 2019-07-24 PROCEDURE — 82607 VITAMIN B-12: CPT

## 2019-07-24 PROCEDURE — 83036 HEMOGLOBIN GLYCOSYLATED A1C: CPT | Mod: GA

## 2019-07-24 PROCEDURE — 99214 OFFICE O/P EST MOD 30 MIN: CPT | Performed by: NURSE PRACTITIONER

## 2019-07-24 PROCEDURE — 36415 COLL VENOUS BLD VENIPUNCTURE: CPT

## 2019-07-24 RX ORDER — FOLIC ACID/VIT B COMPLEX AND C 0.8 MG
1 TABLET ORAL
Qty: 30 TAB | Refills: 6 | Status: SHIPPED | OUTPATIENT
Start: 2019-07-24 | End: 2019-09-09 | Stop reason: SDUPTHER

## 2019-07-24 NOTE — ASSESSMENT & PLAN NOTE
Chronic for 2 years. Having trouble sleeping at night due legs jumping. Worse at dialysis.  Does not believe she has been treated for this in the past.    Denies numbness or tingling in legs.

## 2019-07-25 LAB
EST. AVERAGE GLUCOSE BLD GHB EST-MCNC: 174 MG/DL
FERRITIN SERPL-MCNC: 1439.8 NG/ML (ref 10–291)
HBA1C MFR BLD: 7.7 % (ref 0–5.6)
IRON SATN MFR SERPL: 27 % (ref 15–55)
IRON SERPL-MCNC: 78 UG/DL (ref 40–170)
TIBC SERPL-MCNC: 287 UG/DL (ref 250–450)
VIT B12 SERPL-MCNC: >1500 PG/ML (ref 211–911)

## 2019-08-01 ENCOUNTER — TELEPHONE (OUTPATIENT)
Dept: URGENT CARE | Facility: MEDICAL CENTER | Age: 69
End: 2019-08-01

## 2019-08-01 RX ORDER — ROPINIROLE 0.25 MG/1
TABLET, FILM COATED ORAL
Qty: 90 TAB | Refills: 3 | COMMUNITY
Start: 2019-08-01 | End: 2020-01-06

## 2019-08-01 NOTE — PROGRESS NOTES
Subjective:   Tere Gallegos is a 68 y.o. female here today for the following concerns:    Green stool  Started about one week ago. No difficulty with bowel movements or abdominal pain.     RLS (restless legs syndrome)  Chronic for 2 years. Having trouble sleeping at night due legs jumping. Worse at dialysis.  Does not believe she has been treated for this in the past.    Denies numbness or tingling in legs.        Current medicines (including changes today)  Current Outpatient Medications   Medication Sig Dispense Refill   • B Complex-C-Folic Acid (RENAL-SALVATORE) 0.8 MG Tab Take 1 Tab by mouth 1 time daily as needed (before dialysis). 30 Tab 6   • amLODIPine (NORVASC) 10 MG Tab Take 1 Tab by mouth every day. 90 Tab 3   • hydrOXYzine HCl (ATARAX) 50 MG Tab Take 1 Tab by mouth every 6 hours as needed for Itching. 60 Tab 3   • levothyroxine (SYNTHROID) 25 MCG Tab Take 1 Tab by mouth Every morning on an empty stomach. 30 Tab 11   • metoprolol (LOPRESSOR) 100 MG Tab Take 100 mg by mouth 2 times a day.     • atorvastatin (LIPITOR) 20 MG Tab Take 20 mg by mouth every evening.     • lisinopril (PRINIVIL) 20 MG Tab Take 2 Tabs by mouth 2 times a day. 180 Tab 3   • famotidine (PEPCID) 40 MG Tab Take 1 Tab by mouth every day. 60 Tab 0   • aspirin (ASA) 81 MG Chew Tab chewable tablet Take 81 mg by mouth every day.     • [facility] ASPIRIN ADULT LOW DOSE 81 MG Chew Tab chewable tablet DIOGO KIM MCCORMICK (Patient not taking: Reported on 7/24/2019) 90 Tab 3   • calcium carbonate (TUMS) 500 MG Chew Tab Take 1 Tab by mouth every 12 hours as needed (Indigestion). (Patient not taking: Reported on 7/24/2019) 30 Tab 0   • furosemide (LASIX) 40 MG Tab Take 40 mg by mouth every day.       No current facility-administered medications for this visit.      She  has a past medical history of Abnormal cardiovascular stress test, Blood clotting disorder (HCC), CAD (coronary artery disease), Dental disorder, Diabetes (HCC),  Dialysis patient (HCC), High cholesterol, Hyperlipidemia, Hypertension, Kidney disease, and Kidney transplant candidate.    ROS  No chest pain, no shortness of breath, no abdominal pain  Positive ROS as per HPI.  All other systems reviewed and are negative.     Objective:     /60 (BP Location: Right arm, Patient Position: Sitting, BP Cuff Size: Adult)   Pulse 60   Temp 36.7 °C (98 °F) (*RETIRED* Temporal)   Ht 1.524 m (5')   Wt 57.6 kg (127 lb)   SpO2 95%  Body mass index is 24.8 kg/m².     Physical Exam:  Constitutional: Alert, no distress.  Skin: Warm, dry, good turgor, no rashes in visible areas.  Eye: Equal, round, conjunctiva clear, lids normal.  ENMT: Lips without lesions, good dentition  Neck: Trachea midline  Respiratory: Unlabored respiratory effort, lungs clear to auscultation, no wheezes, no ronchi.  Cardiovascular: Normal S1, S2, no murmur, no edema.  Psych: Alert and oriented x3, normal affect and mood.      Assessment and Plan:   The following treatment plan was discussed    1. Green stool  Unstable  No concerning symptoms, possibly diet related  Advised watch and wait, patient will report any change or worsening of symptoms including diarrhea/nausea/vomiting, abdominal pain, fevers, malaise.    2. RLS (restless legs syndrome)  Unstable  Check iron and B12 levels  After reviewing patient's medications that she brought in, it appears that she was prescribed ropinirole 0.25 mg by Dr. Burnett, her nephrologist.  Advised patient to begin taking this nightly and advised her to take 1 prior to dialysis.   - IRON/TOTAL IRON BIND; Future  - FERRITIN; Future  - VITAMIN B12; Future    3. End stage renal failure on dialysis (HCC)  Restart Renal-Vit, patient had not been taking this.  - B Complex-C-Folic Acid (RENAL-SALVATORE) 0.8 MG Tab; Take 1 Tab by mouth 1 time daily as needed (before dialysis).  Dispense: 30 Tab; Refill: 6    4. End stage renal disease (HCC)   - IRON/TOTAL IRON BIND; Future  -  FERRITIN; Future    5. Type 2 diabetes mellitus with diabetic nephropathy, unspecified whether long term insulin use (HCC)  Due for A1c  - HEMOGLOBIN A1C; Future      Followup: Return in about 3 months (around 10/24/2019) for Restless legs, labs, diabetes.    I have placed the below orders and discussed them with an approved delegating provider. The MA is performing the below orders under the direction of Dr. Davila

## 2019-08-01 NOTE — TELEPHONE ENCOUNTER
----- Message from ANGELITA Concepcion sent at 7/30/2019  5:05 PM PDT -----  Please notify patient that her A1C has increased to 7.7. Please find out if she is taking a medication for diabetes?    Her iron levels are high which is due to her kidney disease.     Her B12 level is high as well, if she is taking a supplement, she should decrease the dose.     ANGELITA Concepcion

## 2019-08-01 NOTE — LETTER
October 24, 2019        Tere Gallegos  [address] [address]        Dear Tere:    Kathy Melgar's office has tried contacting you. At your earliest convenience please contact our office at [phone].      If you have any questions or concerns, please don't hesitate to call.        Sincerely,        MALGORZATA Concepcion.    Electronically Signed

## 2019-09-03 ENCOUNTER — OFFICE VISIT (OUTPATIENT)
Dept: CARDIOLOGY | Facility: MEDICAL CENTER | Age: 69
End: 2019-09-03
Payer: MEDICARE

## 2019-09-03 VITALS
WEIGHT: 136 LBS | HEIGHT: 60 IN | HEART RATE: 65 BPM | SYSTOLIC BLOOD PRESSURE: 158 MMHG | DIASTOLIC BLOOD PRESSURE: 70 MMHG | OXYGEN SATURATION: 97 % | BODY MASS INDEX: 26.7 KG/M2

## 2019-09-03 DIAGNOSIS — I25.10 CORONARY ARTERY DISEASE INVOLVING NATIVE CORONARY ARTERY OF NATIVE HEART WITHOUT ANGINA PECTORIS: ICD-10-CM

## 2019-09-03 DIAGNOSIS — N18.6 HYPERTENSIVE HEART AND KIDNEY DISEASE WITH CHRONIC DIASTOLIC CONGESTIVE HEART FAILURE AND STAGE 5 CHRONIC KIDNEY DISEASE ON CHRONIC DIALYSIS (HCC): ICD-10-CM

## 2019-09-03 DIAGNOSIS — I10 HTN (HYPERTENSION), MALIGNANT: ICD-10-CM

## 2019-09-03 DIAGNOSIS — I13.2 HYPERTENSIVE HEART AND KIDNEY DISEASE WITH CHRONIC DIASTOLIC CONGESTIVE HEART FAILURE AND STAGE 5 CHRONIC KIDNEY DISEASE ON CHRONIC DIALYSIS (HCC): ICD-10-CM

## 2019-09-03 DIAGNOSIS — Z99.2 HYPERTENSIVE HEART AND KIDNEY DISEASE WITH CHRONIC DIASTOLIC CONGESTIVE HEART FAILURE AND STAGE 5 CHRONIC KIDNEY DISEASE ON CHRONIC DIALYSIS (HCC): ICD-10-CM

## 2019-09-03 DIAGNOSIS — I10 HYPERTENSION, UNSPECIFIED TYPE: ICD-10-CM

## 2019-09-03 DIAGNOSIS — Z95.5 S/P CORONARY ARTERY STENT PLACEMENT: ICD-10-CM

## 2019-09-03 DIAGNOSIS — N18.6 ESRD (END STAGE RENAL DISEASE) (HCC): ICD-10-CM

## 2019-09-03 DIAGNOSIS — Z79.899 HIGH RISK MEDICATION USE: ICD-10-CM

## 2019-09-03 DIAGNOSIS — I51.89 LEFT VENTRICULAR DIASTOLIC DYSFUNCTION, NYHA CLASS 2: ICD-10-CM

## 2019-09-03 DIAGNOSIS — I50.32 HYPERTENSIVE HEART AND KIDNEY DISEASE WITH CHRONIC DIASTOLIC CONGESTIVE HEART FAILURE AND STAGE 5 CHRONIC KIDNEY DISEASE ON CHRONIC DIALYSIS (HCC): ICD-10-CM

## 2019-09-03 PROCEDURE — 99214 OFFICE O/P EST MOD 30 MIN: CPT | Performed by: INTERNAL MEDICINE

## 2019-09-03 RX ORDER — AMLODIPINE BESYLATE 10 MG/1
10 TABLET ORAL DAILY
Qty: 90 TAB | Refills: 3 | Status: SHIPPED | OUTPATIENT
Start: 2019-09-03 | End: 2020-11-06

## 2019-09-03 RX ORDER — LISINOPRIL 20 MG/1
40 TABLET ORAL 2 TIMES DAILY
Qty: 180 TAB | Refills: 3 | Status: SHIPPED | OUTPATIENT
Start: 2019-09-03 | End: 2020-06-09 | Stop reason: SDUPTHER

## 2019-09-03 RX ORDER — ATORVASTATIN CALCIUM 20 MG/1
20 TABLET, FILM COATED ORAL DAILY
Qty: 90 TAB | Refills: 3 | Status: SHIPPED | OUTPATIENT
Start: 2019-09-03 | End: 2020-01-22

## 2019-09-03 RX ORDER — METOPROLOL TARTRATE 100 MG/1
100 TABLET ORAL 2 TIMES DAILY
Qty: 180 TAB | Refills: 3 | Status: SHIPPED | OUTPATIENT
Start: 2019-09-03 | End: 2020-06-09

## 2019-09-03 ASSESSMENT — ENCOUNTER SYMPTOMS
DIZZINESS: 0
SHORTNESS OF BREATH: 1
SENSORY CHANGE: 0
MEMORY LOSS: 0
COUGH: 0
PALPITATIONS: 0
MYALGIAS: 0
DIAPHORESIS: 0
FEVER: 0
FALLS: 0
HEADACHES: 0
ABDOMINAL PAIN: 0
BLURRED VISION: 0
DOUBLE VISION: 0
BRUISES/BLEEDS EASILY: 0
DEPRESSION: 0

## 2019-09-03 NOTE — PROGRESS NOTES
Chief Complaint   Patient presents with   • Congestive Heart Failure       Subjective:   Tere Swann is a 68 y.o. female who presents today for cardiac care and evaluation for heart failure. Of note, patient presented to hospital last year with extremely high blood pressure. She was diagnosed with hypertensive emergency. She has a history of end-stage renal disease for which she is on dialysis at this time. She has normal left ventricular systolic function and no significant valvular disease. She does have history of coronary stenting in the past in the RCA.     Patient is feeling better these days. Does get winded upon walking up inclines or for distance. No symptoms at rest or with daily living activities.    Last saw me in 03/2018. Not sure why she is not being followed by her primary cardiologist. We will refer her back to her primary cardiologist for future visits.    Past Medical History:   Diagnosis Date   • Abnormal cardiovascular stress test    • Blood clotting disorder ([facility])     with AVF   • CAD (coronary artery disease)    • Dental disorder     partial dentures- uppers   • Diabetes ([facility])     oral medication   • Dialysis patient ([facility])     [facility]   • High cholesterol    • Hyperlipidemia    • Hypertension    • Kidney disease     renal failure , on dialysis, M, W, F.   • Kidney transplant candidate      Past Surgical History:   Procedure Laterality Date   • ZZZ CARDIAC CATH  9/7/2016    RCA stented with 2 Synergy drug-eluting stents.   • RECOVERY  8/16/2016    Procedure: CATH LAB [facility] WITH POSSIBLE DR. CASTILLO;  Surgeon: [facility];  [place] [facility]  Service:    • ZZZ CARDIAC CATH  8/16/16    100% RCA   • OTHER Left 2014    left arm upper extremity fistula   • OTHER ABDOMINAL SURGERY      left kidney removed due to cancer     Family History   Problem Relation Age of Onset   • Diabetes Sister    • Other Sister         liver disease   • Diabetes  Brother    • Heart Disease Neg Hx      Social History     Socioeconomic History   • Marital status:      Spouse name: Not on file   • Number of children: Not on file   • Years of education: Not on file   • Highest education level: Not on file   Occupational History   • Not on file   Social Needs   • Financial resource strain: Not on file   • Food insecurity:     Worry: Not on file     Inability: Not on file   • Transportation needs:     Medical: Not on file     Non-medical: Not on file   Tobacco Use   • Smoking status: Never Smoker   • Smokeless tobacco: Never Used   Substance and Sexual Activity   • Alcohol use: No     Alcohol/week: 0.0 oz   • Drug use: No   • Sexual activity: Never   Lifestyle   • Physical activity:     Days per week: Not on file     Minutes per session: Not on file   • Stress: Not on file   Relationships   • Social connections:     Talks on phone: Not on file     Gets together: Not on file     Attends [employer] service: Not on file     Active member of club or organization: Not on file     Attends meetings of clubs or organizations: Not on file     Relationship status: Not on file   • Intimate partner violence:     Fear of current or ex partner: Not on file     Emotionally abused: Not on file     Physically abused: Not on file     Forced sexual activity: Not on file   Other Topics Concern   • Not on file   Social History Narrative   • Not on file     No Known Allergies  Outpatient Encounter Medications as of 9/3/2019   Medication Sig Dispense Refill   • ROPINIRole (REQUIP) 0.25 MG Tab TAKE 1 TABLET BY MOUTH AT BEDTIME FOR RESTLESS LEG SYNDROME 90 Tab 3   • B Complex-C-Folic Acid (RENAL-SALVATORE) 0.8 MG Tab Take 1 Tab by mouth 1 time daily as needed (before dialysis). 30 Tab 6   • amLODIPine (NORVASC) 10 MG Tab Take 1 Tab by mouth every day. 90 Tab 3   • hydrOXYzine HCl (ATARAX) 50 MG Tab Take 1 Tab by mouth every 6 hours as needed for Itching. (Patient taking differently: Take 25-50 mg by  mouth every 6 hours as needed for Itching.) 60 Tab 3   • levothyroxine (SYNTHROID) 25 MCG Tab Take 1 Tab by mouth Every morning on an empty stomach. 30 Tab 11   • metoprolol (LOPRESSOR) 100 MG Tab Take 100 mg by mouth 2 times a day.     • atorvastatin (LIPITOR) 20 MG Tab Take 20 mg by mouth every evening.     • lisinopril (PRINIVIL) 20 MG Tab Take 2 Tabs by mouth 2 times a day. 180 Tab 3   • aspirin (ASA) 81 MG Chew Tab chewable tablet Take 81 mg by mouth every day.     • furosemide (LASIX) 40 MG Tab Take 40 mg by mouth every day.     • [facility] ASPIRIN ADULT LOW DOSE 81 MG Chew Tab chewable tablet TOME FERNANDO TABLETA TOS LOS MCCORMICK (Patient not taking: Reported on 7/24/2019) 90 Tab 3   • calcium carbonate (TUMS) 500 MG Chew Tab Take 1 Tab by mouth every 12 hours as needed (Indigestion). (Patient not taking: Reported on 7/24/2019) 30 Tab 0   • famotidine (PEPCID) 40 MG Tab Take 1 Tab by mouth every day. (Patient not taking: Reported on 9/3/2019) 60 Tab 0     No facility-administered encounter medications on file as of 9/3/2019.      Review of Systems   Constitutional: Negative for diaphoresis and fever.   HENT: Negative for nosebleeds.    Eyes: Negative for blurred vision and double vision.   Respiratory: Positive for shortness of breath. Negative for cough.    Cardiovascular: Negative for chest pain and palpitations.   Gastrointestinal: Negative for abdominal pain.   Genitourinary: Negative for dysuria and frequency.   Musculoskeletal: Negative for falls and myalgias.   Skin: Negative for rash.   Neurological: Negative for dizziness, sensory change and headaches.   Endo/Heme/Allergies: Does not bruise/bleed easily.   Psychiatric/Behavioral: Negative for depression and memory loss.        Objective:   /70 (BP Location: Right arm, Patient Position: Sitting, BP Cuff Size: Adult)   Pulse 65   Ht 1.524 m (5')   Wt 61.7 kg (136 lb)   LMP  (LMP Unknown)   SpO2 97%   BMI 26.56 kg/m²     Physical Exam   Constitutional:  She is oriented to person, place, and time. No distress.   HENT:   Head: Normocephalic and atraumatic.   Right Ear: External ear normal.   Left Ear: External ear normal.   Eyes: Right eye exhibits no discharge. Left eye exhibits no discharge.   Neck: No JVD present. No thyromegaly present.   Cardiovascular: Normal rate, regular rhythm, normal heart sounds and intact distal pulses. Exam reveals no gallop and no friction rub.   No murmur heard.  Pulmonary/Chest: Breath sounds normal. No respiratory distress.   Abdominal: Bowel sounds are normal. She exhibits no distension. There is no tenderness.   Musculoskeletal: She exhibits no edema or tenderness.   Neurological: She is alert and oriented to person, place, and time. No cranial nerve deficit.   Skin: Skin is warm and dry. She is not diaphoretic.   Psychiatric: She has a normal mood and affect. Her behavior is normal.   Nursing note and vitals reviewed.      Assessment:     1. Left ventricular diastolic dysfunction, NYHA class 2     2. ESRD (end stage renal disease) ([facility])     3. S/P coronary artery stent placement     4. Coronary artery disease involving native coronary artery of native heart without angina pectoris     5. HTN (hypertension), malignant     6. High risk medication use     7. Hypertensive heart and kidney disease with chronic diastolic congestive heart failure and stage 5 chronic kidney disease on chronic dialysis ([facility])         Medical Decision Making:  Today's Assessment / Status / Plan:   Hypertensive heart disease with ESRD:  Today, based on physical examination findings, patient is euvolemic. No JVD, lungs are clear to auscultation, no pitting edema in bilateral lower extremities, no ascites.    Dry weight is 136 lbs.    Blood pressure is not well controlled.     Continue Lisinpril 20 mg bid.    Will increase Metoprolol to 100 mg bid from once a day (which she reported that she was taking)     Continue Amlodipine 10 mg daily.     Coronary arterial  disease with prior RCA stent 09/2016:  Continue asa, BB, ACE-I and statin.    Hypertension:  Optimize control using cardiomyopathy medical regimen as well.    Hyperlipidemia:  Optimize statin as within guidelines of CAD treatment as above.   Atorvastatin 20 mg po qhs.    We will refer her back to her primary cardiologist for future visits.

## 2019-09-06 ENCOUNTER — TELEPHONE (OUTPATIENT)
Dept: MEDICAL GROUP | Facility: MEDICAL CENTER | Age: 69
End: 2019-09-06

## 2019-09-06 NOTE — TELEPHONE ENCOUNTER
----- Message from ANGELITA Concepcion sent at 8/29/2019 11:22 AM PDT -----  Patient's mammogram is normal but shows dense breast tissue. That makes it difficult to detect small abnormalities with a normal mammogram. There is another test- sonocine- that is better for women with dense breasts. This, unfortunately, is not covered by insurance and costs about $125. If she would like to have this done I will arrange it, let me know. Otherwise plan to repeat mammogram in 1 year.    ANGELITA Concepcion     Offered and patient accepted

## 2019-09-09 DIAGNOSIS — Z99.2 END STAGE RENAL FAILURE ON DIALYSIS (HCC): ICD-10-CM

## 2019-09-09 DIAGNOSIS — N18.6 END STAGE RENAL FAILURE ON DIALYSIS (HCC): ICD-10-CM

## 2019-09-09 RX ORDER — FOLIC ACID/VIT B COMPLEX AND C 0.8 MG
1 TABLET ORAL
Qty: 30 TAB | Refills: 11 | Status: SHIPPED | OUTPATIENT
Start: 2019-09-09 | End: 2020-01-22

## 2019-10-23 ENCOUNTER — OFFICE VISIT (OUTPATIENT)
Dept: MEDICAL GROUP | Facility: MEDICAL CENTER | Age: 69
End: 2019-10-23
Payer: MEDICARE

## 2019-10-23 VITALS
RESPIRATION RATE: 16 BRPM | HEIGHT: 60 IN | SYSTOLIC BLOOD PRESSURE: 124 MMHG | DIASTOLIC BLOOD PRESSURE: 82 MMHG | OXYGEN SATURATION: 94 % | WEIGHT: 140 LBS | HEART RATE: 80 BPM | TEMPERATURE: 98 F | BODY MASS INDEX: 27.48 KG/M2

## 2019-10-23 DIAGNOSIS — N18.6 END STAGE RENAL FAILURE ON DIALYSIS (HCC): ICD-10-CM

## 2019-10-23 DIAGNOSIS — Z99.2 END STAGE RENAL FAILURE ON DIALYSIS (HCC): ICD-10-CM

## 2019-10-23 DIAGNOSIS — G25.81 RLS (RESTLESS LEGS SYNDROME): ICD-10-CM

## 2019-10-23 DIAGNOSIS — E11.21 TYPE 2 DIABETES MELLITUS WITH DIABETIC NEPHROPATHY, UNSPECIFIED WHETHER LONG TERM INSULIN USE (HCC): ICD-10-CM

## 2019-10-23 PROCEDURE — 99214 OFFICE O/P EST MOD 30 MIN: CPT | Performed by: NURSE PRACTITIONER

## 2019-10-23 NOTE — ASSESSMENT & PLAN NOTE
Continues having restless legs at night and with dialysis. Ropinirole 0.25 mg prior to dialysis and at night was not helping at all. Requesting a better medication to treat her symptoms.

## 2019-10-23 NOTE — ASSESSMENT & PLAN NOTE
A1C worse from 6.0 to 7.7. Is eating candy. Using coffee mate in coffee. Tries to limit carbohydrates. Does have some rice and bread, some tortilla.     Not currently on any diabetic medications.

## 2019-10-24 NOTE — PROGRESS NOTES
Subjective:   Tere Gallegos is a 68 y.o. female here today for the following:    RLS (restless legs syndrome)  Continues having restless legs at night and with dialysis. Ropinirole 0.25 mg prior to dialysis and at night was not helping at all. Requesting a better medication to treat her symptoms.     Type 2 diabetes mellitus (HCC)  A1C worse from 6.0 to 7.7. Is eating candy. Using coffee mate in coffee. Tries to limit carbohydrates. Does have some rice and bread, some tortilla.     Not currently on any diabetic medications.     ESRD (end stage renal disease) (HCC)  Currently on transplant list.      Labs reviewed in office.     Current medicines (including changes today)  Current Outpatient Medications   Medication Sig Dispense Refill   • B Complex-C-Folic Acid (RENAL-ASLVATORE) 0.8 MG Tab Take 1 Tab by mouth 1 time daily as needed (before dialysis). 30 Tab 11   • metoprolol (LOPRESSOR) 100 MG Tab Take 1 Tab by mouth 2 times a day. 180 Tab 3   • atorvastatin (LIPITOR) 20 MG Tab Take 1 Tab by mouth every day. 90 Tab 3   • lisinopril (PRINIVIL) 20 MG Tab Take 2 Tabs by mouth 2 times a day. 180 Tab 3   • amLODIPine (NORVASC) 10 MG Tab Take 1 Tab by mouth every day. 90 Tab 3   • ROPINIRole (REQUIP) 0.25 MG Tab TAKE 1 TABLET BY MOUTH AT BEDTIME FOR RESTLESS LEG SYNDROME 90 Tab 3   • hydrOXYzine HCl (ATARAX) 50 MG Tab Take 1 Tab by mouth every 6 hours as needed for Itching. (Patient taking differently: Take 25-50 mg by mouth every 6 hours as needed for Itching.) 60 Tab 3   • levothyroxine (SYNTHROID) 25 MCG Tab Take 1 Tab by mouth Every morning on an empty stomach. 30 Tab 11   • aspirin (ASA) 81 MG Chew Tab chewable tablet Take 81 mg by mouth every day.     • furosemide (LASIX) 40 MG Tab Take 40 mg by mouth every day.     • [facility] ASPIRIN ADULT LOW DOSE 81 MG Chew Tab chewable tablet TOME FERNANDO MCCORMICK (Patient not taking: Reported on 7/24/2019) 90 Tab 3   • calcium carbonate (TUMS) 500 MG Chew Tab Take 1  Tab by mouth every 12 hours as needed (Indigestion). (Patient not taking: Reported on 7/24/2019) 30 Tab 0   • famotidine (PEPCID) 40 MG Tab Take 1 Tab by mouth every day. (Patient not taking: Reported on 9/3/2019) 60 Tab 0     No current facility-administered medications for this visit.      She  has a past medical history of Abnormal cardiovascular stress test, Blood clotting disorder ([facility]), CAD (coronary artery disease), Dental disorder, Diabetes ([facility]), Dialysis patient ([facility]), High cholesterol, Hyperlipidemia, Hypertension, Kidney disease, and Kidney transplant candidate.    ROS  No chest pain, no shortness of breath, no abdominal pain  Positive ROS as per HPI.  All other systems reviewed and are negative.     Objective:     /82 (BP Location: Right arm, Patient Position: Sitting, BP Cuff Size: Adult)   Pulse 80   Temp 36.7 °C (98 °F) (Temporal)   Resp 16   Ht 1.524 m (5')   Wt 63.5 kg (140 lb)   SpO2 94%  Body mass index is 27.34 kg/m².     Physical Exam:  Constitutional: Alert, no distress.  Skin: Warm, dry, good turgor, no rashes in visible areas.  Eye: Equal, round, conjunctiva clear, lids normal.  ENMT: Lips without lesions, good dentition  Neck: Trachea midline  Respiratory: Unlabored respiratory effort, lungs clear to auscultation, no wheezes, no ronchi.  Cardiovascular: Normal S1, S2, no murmur, no edema.  Psych: Alert and oriented x3, normal affect and mood.      Assessment and Plan:   The following treatment plan was discussed    1. RLS (restless legs syndrome)  Unstable  Titrate up on ropinerole to 0.5 mg before dialysis and before bed, may increase to 0.75 mg BID after one week if not effective. Then may increase to 1 mg BID after another 1 week if not effective. If patient continues to have symptoms, will switch medications.     2. Type 2 diabetes mellitus with diabetic nephropathy, unspecified whether long term insulin use (HCC)  Unstable  [place] A1C <8, recently jumped up to 7.7.   Dietary  modifications discussed, but patient does not seem motivated to make any changes.   May need to be started on insulin if A1C > 8%  - REFERRAL TO ENDOCRINOLOGY    3. End stage renal failure on dialysis (HCC)  - REFERRAL TO ENDOCRINOLOGY          Followup: Return in about 2 weeks (around 11/6/2019) for RLS.    I have placed the below orders and discussed them with an approved delegating provider. The MA is performing the below orders under the direction of Dr. Davila

## 2019-11-12 ENCOUNTER — HOSPITAL ENCOUNTER (OUTPATIENT)
Dept: LAB | Facility: MEDICAL CENTER | Age: 69
End: 2019-11-12
Attending: INTERNAL MEDICINE
Payer: MEDICARE

## 2019-11-12 ENCOUNTER — OFFICE VISIT (OUTPATIENT)
Dept: ENDOCRINOLOGY | Facility: MEDICAL CENTER | Age: 69
End: 2019-11-12
Payer: MEDICARE

## 2019-11-12 VITALS
WEIGHT: 134 LBS | OXYGEN SATURATION: 96 % | BODY MASS INDEX: 26.31 KG/M2 | HEART RATE: 74 BPM | SYSTOLIC BLOOD PRESSURE: 122 MMHG | HEIGHT: 60 IN | DIASTOLIC BLOOD PRESSURE: 78 MMHG

## 2019-11-12 DIAGNOSIS — E11.65 TYPE 2 DIABETES MELLITUS WITH HYPERGLYCEMIA, WITHOUT LONG-TERM CURRENT USE OF INSULIN (HCC): ICD-10-CM

## 2019-11-12 DIAGNOSIS — N18.6 ESRD (END STAGE RENAL DISEASE) (HCC): ICD-10-CM

## 2019-11-12 DIAGNOSIS — E78.2 MIXED HYPERLIPIDEMIA: ICD-10-CM

## 2019-11-12 DIAGNOSIS — E03.8 SUBCLINICAL HYPOTHYROIDISM: ICD-10-CM

## 2019-11-12 PROBLEM — E78.5 HYPERLIPIDEMIA: Status: ACTIVE | Noted: 2019-11-12

## 2019-11-12 LAB
ALBUMIN SERPL BCP-MCNC: 5.2 G/DL (ref 3.2–4.9)
ALBUMIN/GLOB SERPL: 1.7 G/DL
ALP SERPL-CCNC: 108 U/L (ref 30–99)
ALT SERPL-CCNC: 20 U/L (ref 2–50)
ANION GAP SERPL CALC-SCNC: 12 MMOL/L (ref 0–11.9)
AST SERPL-CCNC: 20 U/L (ref 12–45)
BILIRUB SERPL-MCNC: 0.5 MG/DL (ref 0.1–1.5)
BUN SERPL-MCNC: 27 MG/DL (ref 8–22)
CALCIUM SERPL-MCNC: 10.1 MG/DL (ref 8.5–10.5)
CHLORIDE SERPL-SCNC: 96 MMOL/L (ref 96–112)
CO2 SERPL-SCNC: 32 MMOL/L (ref 20–33)
CREAT SERPL-MCNC: 6.85 MG/DL (ref 0.5–1.4)
GLOBULIN SER CALC-MCNC: 3.1 G/DL (ref 1.9–3.5)
GLUCOSE SERPL-MCNC: 156 MG/DL (ref 65–99)
HBA1C MFR BLD: 8.7 % (ref 0–5.6)
INT CON NEG: ABNORMAL
INT CON POS: ABNORMAL
POTASSIUM SERPL-SCNC: 4.7 MMOL/L (ref 3.6–5.5)
PROT SERPL-MCNC: 8.3 G/DL (ref 6–8.2)
SODIUM SERPL-SCNC: 140 MMOL/L (ref 135–145)
T4 FREE SERPL-MCNC: 1.27 NG/DL (ref 0.53–1.43)
THYROPEROXIDASE AB SERPL-ACNC: 2.4 IU/ML (ref 0–9)
TSH SERPL DL<=0.005 MIU/L-ACNC: 3.22 UIU/ML (ref 0.38–5.33)

## 2019-11-12 PROCEDURE — 84439 ASSAY OF FREE THYROXINE: CPT

## 2019-11-12 PROCEDURE — 36415 COLL VENOUS BLD VENIPUNCTURE: CPT

## 2019-11-12 PROCEDURE — 99205 OFFICE O/P NEW HI 60 MIN: CPT | Performed by: INTERNAL MEDICINE

## 2019-11-12 PROCEDURE — 83036 HEMOGLOBIN GLYCOSYLATED A1C: CPT | Performed by: INTERNAL MEDICINE

## 2019-11-12 PROCEDURE — 86341 ISLET CELL ANTIBODY: CPT

## 2019-11-12 PROCEDURE — 84681 ASSAY OF C-PEPTIDE: CPT

## 2019-11-12 PROCEDURE — 86376 MICROSOMAL ANTIBODY EACH: CPT

## 2019-11-12 PROCEDURE — 80053 COMPREHEN METABOLIC PANEL: CPT

## 2019-11-12 PROCEDURE — 84443 ASSAY THYROID STIM HORMONE: CPT

## 2019-11-12 RX ORDER — LEVOTHYROXINE SODIUM 0.05 MG/1
50 TABLET ORAL
Qty: 30 TAB | Refills: 1 | Status: SHIPPED | OUTPATIENT
Start: 2019-11-12 | End: 2019-12-04 | Stop reason: SDUPTHER

## 2019-11-12 RX ORDER — BLOOD-GLUCOSE METER
1 EACH MISCELLANEOUS 4 TIMES DAILY
Qty: 1 KIT | Refills: 1 | Status: SHIPPED | OUTPATIENT
Start: 2019-11-12 | End: 2020-01-22

## 2019-11-12 NOTE — PROGRESS NOTES
"Chief Complaint:  Consult requested by ANGELITA Concepcion for initial evaluation of Type 2 Diabetes Mellitus    HPI:   Tere Gallegos is a 68 y.o. female with Type 2 Diabetes Mellitus diagnosed in 2000.  She denies hospitalizations for DKA in the past.    Her a1c is elevated at 8.7% on 11/12/2019 prior to this her a1c was 7.7% on 7/2019.      She is not on any diabetes medications.  She claims that she used to be on oral agents for diabetes but no longer recalls why or when it was stopped.  She reports hyperglycemia and some mild hyperglycemic symptoms such as polydipsia.  She has a history of end-stage renal disease and is on hemodialysis every Monday, Wednesday and Friday.  She also has primary hypothyroidism discovered on March 2019 after she was found to have an elevated TSH of 6.4.  Her last TSH was suboptimal at 3.2 on April 2019 while taking levothyroxine 25 MCG daily.  She reports good compliance and is tolerating it well however she reports fatigue.  She denies constipation and cold intolerance.    She does not monitor her blood glucose.  She does not have a glucose meter    She denies hypoglycemic episodes.  She  denies hypoglycemic unawareness. She denies episodes of severe hypoglycemia requiring third party assistance.  She  is not wearing a medical alert bracelet or necklace.  She does not a glucagon emergency kit.    She denies attending diabetes education classes.  Diet: \"healthy\" diet  in general.    Diabetes Complications   She  reports history of retinopathy.  She denies laser eye surgery. Last eye exam: She is overdue a retinal exam  She denies history of peripheral sensory neuropathy.  She denies numbness, tingling in both feet.  She denies history of foot sores.   She reports history of kidney damage.  She is on ACE inhibitor or ARB.   She reports history of coronary artery disease.  She  denies history of stroke and denies TIA.  She denies history of PAD.  She reports history " of hyperlipidemia.  She is on atorvastatin and she is tolerating this well.       ROS:     CONS:     No fever, no chills, no weight loss, reports fatigue   EYES:      No diplopia, no blurry vision, no redness of eyes, no swelling of eyelids   ENT:    No hearing loss, No ear pain, No sore throat, no dysphagia, no neck swelling   CV:     No chest pain, no palpitations, no claudication, no orthopnea, no PND   PULM:    No SOB, no cough, no hemoptysis, no wheezing    GI:   No nausea, no vomiting, no diarrhea, no constipation, no bloody stools   :  Passing urine well, no dysuria, no hematuria   ENDO:   No polyuria, reports polydipsia, no heat intolerance, no cold intolerance   NEURO: No headaches, no dizziness, no convulsions, no tremors   MUSC:  No joint swellings, no arthralgias, no myalgias, no weakness   SKIN:   No rash, no ulcers, no dry skin   PSYCH:   No depression, no anxiety, no difficulty sleeping       Past Medical History:  Patient Active Problem List    Diagnosis Date Noted   • Abdominal pain 12/23/2018     Priority: High   • Hypertensive urgency 12/22/2018     Priority: High   • Green stool 07/24/2019   • Postmenopausal 04/24/2019   • Itching 03/21/2019   • Subclinical hypothyroidism 03/21/2019   • Transaminitis 12/22/2018   • LUQ pain 11/12/2018   • No appetite 01/22/2018   • Acute hypoxemic respiratory failure ([facility]) 10/17/2017   • ESRD (end stage renal disease) ([facility]) 10/16/2017   • Acute on chronic diastolic CHF (congestive heart failure) ([facility]) 10/16/2017   • Chronic total occlusion of native coronary artery 09/07/2016   • S/P coronary artery stent placement 09/07/2016   • Renal hypertension 08/05/2016   • End stage renal failure on dialysis ([facility]) 08/05/2016   • RLS (restless legs syndrome) 08/05/2016   • Type 2 diabetes mellitus ([facility]) 08/05/2016   • Kidney transplant candidate    • Abnormal cardiovascular stress test    • Essential hypertension 09/17/2013       Past Surgical History:  Past Surgical  History:   Procedure Laterality Date   • [facility] CARDIAC CATH  9/7/2016    RCA stented with 2 Synergy drug-eluting stents.   • RECOVERY  8/16/2016    Procedure: CATH LAB [facility] WITH POSSIBLE DR. CASTILLO;  Surgeon: [facility];  [place] [facility] [place]:    • [facility] CARDIAC CATH  8/16/16    100% RCA   • OTHER Left 2014    left arm upper extremity fistula   • OTHER ABDOMINAL SURGERY      left kidney removed due to cancer        Allergies:  Patient has no known allergies.     Current Medications:    Current Outpatient Medications:   •  metoprolol (LOPRESSOR) 100 MG Tab, Take 1 Tab by mouth 2 times a day., Disp: 180 Tab, Rfl: 3  •  atorvastatin (LIPITOR) 20 MG Tab, Take 1 Tab by mouth every day., Disp: 90 Tab, Rfl: 3  •  lisinopril (PRINIVIL) 20 MG Tab, Take 2 Tabs by mouth 2 times a day., Disp: 180 Tab, Rfl: 3  •  amLODIPine (NORVASC) 10 MG Tab, Take 1 Tab by mouth every day., Disp: 90 Tab, Rfl: 3  •  ROPINIRole (REQUIP) 0.25 MG Tab, TAKE 1 TABLET BY MOUTH AT BEDTIME FOR RESTLESS LEG SYNDROME, Disp: 90 Tab, Rfl: 3  •  hydrOXYzine HCl (ATARAX) 50 MG Tab, Take 1 Tab by mouth every 6 hours as needed for Itching. (Patient taking differently: Take 25-50 mg by mouth every 6 hours as needed for Itching.), Disp: 60 Tab, Rfl: 3  •  levothyroxine (SYNTHROID) 25 MCG Tab, Take 1 Tab by mouth Every morning on an empty stomach., Disp: 30 Tab, Rfl: 11  •  [employer] ASPIRIN ADULT LOW DOSE 81 MG Chew Tab chewable tablet, TOME FERNANDO TABLETA TODOS [place], Disp: 90 Tab, Rfl: 3  •  furosemide (LASIX) 40 MG Tab, Take 40 mg by mouth every day., Disp: , Rfl:   •  B Complex-C-Folic Acid (RENAL-SALVATORE) 0.8 MG Tab, Take 1 Tab by mouth 1 time daily as needed (before dialysis). (Patient not taking: Reported on 11/12/2019), Disp: 30 Tab, Rfl: 11  •  calcium carbonate (TUMS) 500 MG Chew Tab, Take 1 Tab by mouth every 12 hours as needed (Indigestion). (Patient not taking: Reported on 7/24/2019), Disp: 30 Tab, Rfl: 0  •   famotidine (PEPCID) 40 MG Tab, Take 1 Tab by mouth every day. (Patient not taking: Reported on 9/3/2019), Disp: 60 Tab, Rfl: 0  •  aspirin (ASA) 81 MG Chew Tab chewable tablet, Take 81 mg by mouth every day., Disp: , Rfl:     Social History:  Social History     Socioeconomic History   • Marital status:      Spouse name: Not on file   • Number of children: Not on file   • Years of education: Not on file   • Highest education level: Not on file   Occupational History   • Not on file   Social Needs   • Financial resource strain: Not on file   • Food insecurity:     Worry: Not on file     Inability: Not on file   • Transportation needs:     Medical: Not on file     Non-medical: Not on file   Tobacco Use   • Smoking status: Never Smoker   • Smokeless tobacco: Never Used   Substance and Sexual Activity   • Alcohol use: No     Alcohol/week: 0.0 oz   • Drug use: No   • Sexual activity: Never   Lifestyle   • Physical activity:     Days per week: Not on file     Minutes per session: Not on file   • Stress: Not on file   Relationships   • Social connections:     Talks on phone: Not on file     Gets together: Not on file     Attends [employer] service: Not on file     Active member of club or organization: Not on file     Attends meetings of clubs or organizations: Not on file     Relationship status: Not on file   • Intimate partner violence:     Fear of current or ex partner: Not on file     Emotionally abused: Not on file     Physically abused: Not on file     Forced sexual activity: Not on file   Other Topics Concern   • Not on file   Social History Narrative   • Not on file        Family History:   Family History   Problem Relation Age of Onset   • Diabetes Sister    • Other Sister         liver disease   • Diabetes Brother    • Heart Disease Neg Hx        PHYSICAL EXAM:   Vital signs: /78 (BP Location: Left arm, Patient Position: Sitting)   Pulse 74   Ht 1.524 m (5')   Wt 60.8 kg (134 lb)   LMP  (LMP  Unknown)   SpO2 96%   BMI 26.17 kg/m²   GENERAL: Elderly female in no apparent distress.   EYE: No ocular and eyelid asymmetry, Anicteric sclerae,  PERRL, No exophthalmos or lidlag  HENT: Hearing grossly intact, Normocephalic, atraumatic. Pink, moist mucous membranes, No exudate  NECK: Supple. Trachea midline. thyroid is normal in size without nodules or tenderness  CARDIOVASCULAR: Regular rate and rhythm. No murmurs, rubs, or gallops.   LUNGS: Clear to auscultation bilaterally   ABDOMEN: Soft, nontender with positive bowel sounds.   EXTREMITIES: No clubbing, cyanosis, or edema.  Fistula is visible and palpable on the left forearm  NEUROLOGICAL: Cranial nerves II-XII are grossly intact   Symmetric reflexes at the patella no proximal muscle weakness, No visible tremor with both outstretched hands  LYMPH: No cervical, supraclavicular,  adenopathy palpated.   SKIN: No rashes, lesions. Turgor is normal.  FOOT: Normal sensation to monofilament testing, normal pulses, no ulcers.  Normal Vibration quantitative sensation test.    Labs:  Lab Results   Component Value Date/Time    HBA1C 7.7 (H) 07/24/2019 1626    AVGLUC 174 07/24/2019 1626       Lab Results   Component Value Date/Time    WBC 3.9 (L) 03/05/2019 09:48 AM    RBC 4.08 (L) 03/05/2019 09:48 AM    HEMOGLOBIN 11.6 (L) 03/05/2019 09:48 AM    MCV 89.7 03/05/2019 09:48 AM    MCH 28.4 03/05/2019 09:48 AM    MCHC 31.7 (L) 03/05/2019 09:48 AM    RDW 51.4 (H) 03/05/2019 09:48 AM    MPV 11.1 03/05/2019 09:48 AM       Lab Results   Component Value Date/Time    SODIUM 136 03/05/2019 09:48 AM    POTASSIUM 4.0 03/05/2019 09:48 AM    CHLORIDE 94 (L) 03/05/2019 09:48 AM    CO2 32 03/05/2019 09:48 AM    ANION 10.0 03/05/2019 09:48 AM    GLUCOSE 107 (H) 03/05/2019 09:48 AM    BUN 35 (H) 03/05/2019 09:48 AM    CREATININE 5.68 (HH) 03/05/2019 09:48 AM    CALCIUM 9.6 03/05/2019 09:48 AM    ASTSGOT 24 03/05/2019 09:48 AM    ALTSGPT 28 03/05/2019 09:48 AM    TBILIRUBIN 0.5 03/05/2019  09:48 AM    ALBUMIN 4.0 03/05/2019 09:48 AM    TOTPROTEIN 7.1 03/05/2019 09:48 AM    GLOBULIN 3.1 03/05/2019 09:48 AM    AGRATIO 1.3 03/05/2019 09:48 AM       Lab Results   Component Value Date/Time    CHOLSTRLTOT 91 (L) 12/23/2018 0101    TRIGLYCERIDE 139 12/23/2018 0101    HDL 30 (A) 12/23/2018 0101    LDL 33 12/23/2018 0101       No results found for: MICROALBCALC, MALBCRT, MALBEXCR, YTCDWH60, MICROALBUR, MICRALB, UMICROALBUM, MICROALBTIM     Lab Results   Component Value Date/Time    TSHULTRASEN 3.270 04/15/2019 1006     No results found for: FREEDIR  No results found for: FREET3  No results found for: THYSTIMIG      ASSESSMENT/PLAN:     1. Type 2 diabetes mellitus with hyperglycemia, without long-term current use of insulin (HCC)  Uncontrolled secondary to hyperglycemia.  Discussed the pathogenesis of type 2 diabetes and its overall management.  Reviewed the importance of taking diabetes medications regularly.    At this time I explained to the patient that because she has ESRD her treatment options are limited.  I want her to start GLP-1 analog therapy with Trulicity 0.75 mg weekly.  I discussed the side effects of this medication in detail.    I reviewed the importance of watching her carb intake.  I recommend that she start checking her sugars at least 3 times a day due to fluctuating glucose control.  Foot exam was completed today I am going to schedule her for an eye exam.  We will plan for follow-up in 2 weeks to review her blood sugars.    2. Subclinical hypothyroidism  Suboptimal control her last TSH was suboptimal, I am adjusting her levothyroxine dose to 50 MCG daily.  I am checking a TSH and TPO antibodies with her next labs in 2 months.  I reviewed the importance of adhering to thyroid hormone replacement therapy.    3. Mixed hyperlipidemia  Well-controlled, continue atorvastatin follow low-carb and low-fat diet.  Recommend repeating fasting lipids with next labs    4. ESRD (end stage renal disease)  ([facility])  Advised patient to remain well-hydrated and to avoid potential nephrotoxins such as NSAIDs and IV contrast    Return in about 3 weeks (around 12/3/2019).       This patient during there office visit was started on new medication.  Side effects of new medications were discussed with the patient today in the office. The patient was supplied paperwork on this new medication.    Thank you kindly for allowing me to participate in the diabetes care plan for this patient.    Jimmy Bloom MD, [facility], [facility]  11/12/19    CC:   ANGELITA Concepcion

## 2019-11-12 NOTE — PATIENT INSTRUCTIONS
Monitor your blood sugar regularly as discussed at this visit.  Bring your blood sugar log book or meter to each visit.  Take your medications regularly as we discussed at this visit.  Call my office if you are having difficulty with uncontrolled blood sugars.  Notify me for difficulty with low blood sugar. Work on diet including food portion size control and schedule daily exercise.  Please have your labs drawn prior to your next visit so that we may discuss them at the time of your next visit.

## 2019-11-13 ENCOUNTER — TELEPHONE (OUTPATIENT)
Dept: ENDOCRINOLOGY | Facility: MEDICAL CENTER | Age: 69
End: 2019-11-13

## 2019-11-13 LAB
C PEPTIDE SERPL-MCNC: 15.2 NG/ML (ref 0.8–3.5)
PANC ISLET CELL AB TITR SER: NORMAL {TITER}

## 2019-11-13 NOTE — TELEPHONE ENCOUNTER
1. Caller Name: [facility]                                          Call Back Number: [phone]      Patient approves a detailed voicemail message:     Lab called giving critical lab results for pt   Creatinine 0.50 - 1.40 mg/dL 6.85High Panic      Please advise

## 2019-11-14 NOTE — FLOWSHEET NOTE
10/17/17 1120   Chest Exam   Respiration 18   Heart Rate (Monitored) 66   Breath Sounds   Pre/Post Intervention Pre Intervention Assessment   RUL Breath Sounds Clear   RML Breath Sounds Clear   RLL Breath Sounds Diminished   RAMY Breath Sounds Clear   LLL Breath Sounds Diminished   Oximetry   #Pulse Oximetry (Single Determination) Yes   Oxygen   Home O2 Use Prior To Admission? No   Pulse Oximetry 97 %   O2 (LPM) 2   O2 Daily Delivery Respiratory  Silicone Nasal Cannula     
0

## 2019-11-15 LAB — GAD65 AB SER IA-ACNC: <5 IU/ML (ref 0–5)

## 2019-12-02 NOTE — ASSESSMENT & PLAN NOTE
Hx of left nephrectomy due to kidney cancer  On dialysis MWF, following Dr. Burnett.   On waiting list for kidney transplant at [facility].    Pt's son requests lasix refill. Pt has been on lasix for a long time. Pt reports that she has ran out of medication and she does not remember when was her last dose and when was last time she talks to nephrologist.   
no

## 2019-12-04 RX ORDER — LEVOTHYROXINE SODIUM 0.05 MG/1
50 TABLET ORAL
Qty: 30 TAB | Refills: 1 | Status: SHIPPED | OUTPATIENT
Start: 2019-12-04 | End: 2020-02-11

## 2019-12-04 NOTE — TELEPHONE ENCOUNTER
Was the patient seen in the last year in this department? Yes    Does patient have an active prescription for medications requested? Yes    Received Request Via: Pharmacy     levothyroxine (SYNTHROID) 50 MCG Tab        Sig: Take 1 Tab by mouth Every morning on an empty stomach.

## 2019-12-05 ENCOUNTER — OFFICE VISIT (OUTPATIENT)
Dept: ENDOCRINOLOGY | Facility: MEDICAL CENTER | Age: 69
End: 2019-12-05
Payer: MEDICARE

## 2019-12-05 VITALS
WEIGHT: 131 LBS | DIASTOLIC BLOOD PRESSURE: 70 MMHG | OXYGEN SATURATION: 97 % | HEIGHT: 60 IN | HEART RATE: 68 BPM | SYSTOLIC BLOOD PRESSURE: 118 MMHG | BODY MASS INDEX: 25.72 KG/M2

## 2019-12-05 DIAGNOSIS — I10 ESSENTIAL HYPERTENSION: ICD-10-CM

## 2019-12-05 DIAGNOSIS — N18.6 ESRD (END STAGE RENAL DISEASE) (HCC): ICD-10-CM

## 2019-12-05 DIAGNOSIS — E78.2 MIXED HYPERLIPIDEMIA: ICD-10-CM

## 2019-12-05 DIAGNOSIS — E03.8 SUBCLINICAL HYPOTHYROIDISM: ICD-10-CM

## 2019-12-05 DIAGNOSIS — E11.65 TYPE 2 DIABETES MELLITUS WITH HYPERGLYCEMIA, WITHOUT LONG-TERM CURRENT USE OF INSULIN (HCC): ICD-10-CM

## 2019-12-05 PROCEDURE — 99214 OFFICE O/P EST MOD 30 MIN: CPT | Performed by: INTERNAL MEDICINE

## 2019-12-06 NOTE — PROGRESS NOTES
CHIEF COMPLAINT: Patient is here for follow up of Type 2 Diabetes Mellitus    HPI:     Tere Gallegos is a 68 y.o. female with Type 2 Diabetes Mellitus here for follow up.     Labs from 11/12/2019 show HbA1c is elevated at 8.7%    I initially saw her as a consult from Kathy CHAPA for poorly controlled diabetes at baseline while not taking any medications.  She has a history of end-stage renal disease and is on hemodialysis and also has a history of subclinical hypothyroidism and hyperlipidemia.    After her initial consultation visit I started her on Trulicity 0.75 mg weekly.    On follow-up she continues to stay on Trulicity 0.75 mg weekly for her diabetes.  She reports to noncompliance with sugar monitoring and she did not bring her meter.  She has not been testing her sugars for the past 2 weeks.    She denies hyperglycemic and hypoglycemic symptoms    She also had uncontrolled subclinical hypothyroidism at baseline with suboptimal TSH of 3.2 in April and I recommended adjusting her levothyroxine to 50 minutes e.g. daily but apparently she did not  the new prescription.  She reports fatigue and cold intolerance.    She has hyperlipidemia that is fairly controlled and is taking atorvastatin and she is tolerating this medication well    She also has essential hypertension and is taking metoprolol, lisinopril and amlodipine and blood pressures are well controlled and she is tolerating her medications well    BG Diary:12/5/2019  Not testing    Weight has been stable    Diabetes Complications   Retinopathy: No known retinopathy.  Last eye exam: She is overdue for an eye exam  Neuropathy: Denies paresthesias or numbness in hands or feet. Denies any foot wounds.  Exercise: Minimal.  Diet: Fair.  Patient's medications, allergies, and social histories were reviewed and updated as appropriate.    ROS:     CONS:     No fever, no chills   EYES:     No diplopia, no blurry vision   CV:           No  chest pain, no palpitations   PULM:     No SOB, no cough, no hemoptysis.   GI:            No nausea, no vomiting, no diarrhea, no constipation   ENDO:     No polyuria, no polydipsia, no heat intolerance, no cold intolerance       Past Medical History:  Problem List:  2019-11: Hyperlipidemia  2019-07: Green stool  2019-04: Postmenopausal  2019-03: Itching  2019-03: Subclinical hypothyroidism  2018-12: Abdominal pain  2018-12: Hypertensive urgency  2018-12: Transaminitis  2018-11: LUQ pain  2018-01: No appetite  2017-10: Acute hypoxemic respiratory failure ([facility])  2017-10: ESRD (end stage renal disease) ([facility])  2017-10: Acute on chronic diastolic CHF (congestive heart failure)   ([facility])  2016-09: Encounter for cervical Pap smear with pelvic exam  2016-09: Chronic total occlusion of native coronary artery  2016-09: S/P coronary artery stent placement  2016-09: Encounter to establish care with new doctor  2016-08: Renal hypertension  2016-08: End stage renal failure on dialysis ([facility])  2016-08: RLS (restless legs syndrome)  2016-08: Type 2 diabetes mellitus with hyperglycemia, without long-  term current use of insulin ([facility])  2014-01: Hyperkalemia  2013-09: Essential hypertension  Kidney transplant candidate  Abnormal cardiovascular stress test      Past Surgical History:  Past Surgical History:   Procedure Laterality Date   • [facility] CARDIAC CATH  9/7/2016    RCA stented with 2 Synergy drug-eluting stents.   • RECOVERY  8/16/2016    Procedure: CATH LAB [facility] WITH POSSIBLE DR. CASTILLO;  Surgeon: [facility];  Location: [place] PRE-POST PROC [place] [facility] [place];  Service:    • [facility] CARDIAC CATH  8/16/16    100% RCA   • OTHER Left 2014    left arm upper extremity fistula   • OTHER ABDOMINAL SURGERY      left kidney removed due to cancer        Allergies:  Patient has no known allergies.     Social History:  Social History     Tobacco Use   • Smoking status: Never Smoker   • Smokeless tobacco: Never Used   Substance Use Topics   •  Alcohol use: No     Alcohol/week: 0.0 oz   • Drug use: No        Family History:   family history includes Diabetes in her brother and sister; Other in her sister.      PHYSICAL EXAM:   OBJECTIVE:  Vital signs: /70 (BP Location: Left arm, Patient Position: Sitting)   Pulse 68   Ht 1.524 m (5')   Wt 59.4 kg (131 lb)   LMP  (LMP Unknown)   SpO2 97%   BMI 25.58 kg/m²   GENERAL: Well-developed, well-nourished in no apparent distress.   EYE:  No ocular asymmetry, PERRLA  HENT: Pink, moist mucous membranes.    NECK: No thyromegaly.   CARDIOVASCULAR:  No murmurs  LUNGS: Clear breath sounds  ABDOMEN: Soft, nontender   EXTREMITIES: No clubbing, cyanosis, or edema.   NEUROLOGICAL: No gross focal motor abnormalities   LYMPH: No cervical adenopathy palpated.   SKIN: No rashes, lesions.     Labs:  Lab Results   Component Value Date/Time    HBA1C 8.7 (A) 11/12/2019 09:17 AM        Lab Results   Component Value Date/Time    WBC 3.9 (L) 03/05/2019 09:48 AM    RBC 4.08 (L) 03/05/2019 09:48 AM    HEMOGLOBIN 11.6 (L) 03/05/2019 09:48 AM    MCV 89.7 03/05/2019 09:48 AM    MCH 28.4 03/05/2019 09:48 AM    MCHC 31.7 (L) 03/05/2019 09:48 AM    RDW 51.4 (H) 03/05/2019 09:48 AM    MPV 11.1 03/05/2019 09:48 AM       Lab Results   Component Value Date/Time    SODIUM 140 11/12/2019 12:47 PM    POTASSIUM 4.7 11/12/2019 12:47 PM    CHLORIDE 96 11/12/2019 12:47 PM    CO2 32 11/12/2019 12:47 PM    ANION 12.0 (H) 11/12/2019 12:47 PM    GLUCOSE 156 (H) 11/12/2019 12:47 PM    BUN 27 (H) 11/12/2019 12:47 PM    CREATININE 6.85 (HH) 11/12/2019 12:47 PM    CALCIUM 10.1 11/12/2019 12:47 PM    ASTSGOT 20 11/12/2019 12:47 PM    ALTSGPT 20 11/12/2019 12:47 PM    TBILIRUBIN 0.5 11/12/2019 12:47 PM    ALBUMIN 5.2 (H) 11/12/2019 12:47 PM    TOTPROTEIN 8.3 (H) 11/12/2019 12:47 PM    GLOBULIN 3.1 11/12/2019 12:47 PM    AGRATIO 1.7 11/12/2019 12:47 PM       Lab Results   Component Value Date/Time    CHOLSTRLTOT 91 (L) 12/23/2018 0101    TRIGLYCERIDE 139  12/23/2018 0101    HDL 30 (A) 12/23/2018 0101    LDL 33 12/23/2018 0101       No results found for: MICROALBCALC, MALBCRT, MALBEXCR, EMYPRC58, MICROALBUR, MICRALB, UMICROALBUM, MICROALBTIM     Lab Results   Component Value Date/Time    TSHULTRASEN 3.220 11/12/2019 1247     No results found for: FREEDIR  No results found for: FREET3  No results found for: THYSTIMIG        ASSESSMENT/PLAN:     1. Type 2 diabetes mellitus with hyperglycemia, without long-term current use of insulin (HCC)  Uncontrolled secondary to noncompliance and lack of medical therapy from the outset  She is now on a GLP-1 analog however she is noncompliant with sugar monitoring  Advised patient to bring her meter to the office so that we can educate her on how to use a glucose meter  Advised patient to watch her diet and carbohydrate intake and exercise regularly    We will plan for follow-up in 2 weeks to review her blood sugars      2. Subclinical hypothyroidism  Uncontrolled suboptimal TSH at baseline recommend patient to  the new prescription which was sent last visit  Stop levothyroxine 25 and adjust to the new dose of levothyroxine 50 MCG daily  Reviewed the importance of adherence to thyroid hormone replacement therapy.  We will plan to repeat her TSH level in 2 to 3 months    3. Mixed hyperlipidemia  Well-controlled, continue atorvastatin follow low-fat diet  We will repeat fasting lipids in 3 months    4. ESRD (end stage renal disease) (HCC)  Advised patient avoid potential nephrotoxins such as NSAIDs and IV contrast  Advised patient to remain well-hydrated    5. Essential hypertension  Well-controlled continue current medications we will check urine microalbumin creatinine ratio with next labs      Return in about 2 weeks (around 12/19/2019).      This patient during there office visit today was started on a new medication.  Side effects of the new medication were discussed with the patient today in the office.     Thank you  kindly for allowing me to participate in the diabetes care plan for this patient.    Jimmy Bloom MD, [facility], [facility]  12/05/19    CC:   ANGELITA Concepcion

## 2019-12-18 ENCOUNTER — OFFICE VISIT (OUTPATIENT)
Dept: ENDOCRINOLOGY | Facility: MEDICAL CENTER | Age: 69
End: 2019-12-18
Payer: MEDICARE

## 2019-12-18 VITALS
DIASTOLIC BLOOD PRESSURE: 78 MMHG | BODY MASS INDEX: 25.76 KG/M2 | OXYGEN SATURATION: 96 % | HEART RATE: 66 BPM | SYSTOLIC BLOOD PRESSURE: 168 MMHG | HEIGHT: 60 IN | WEIGHT: 131.2 LBS

## 2019-12-18 DIAGNOSIS — E03.8 SUBCLINICAL HYPOTHYROIDISM: ICD-10-CM

## 2019-12-18 DIAGNOSIS — E11.65 TYPE 2 DIABETES MELLITUS WITH HYPERGLYCEMIA, WITHOUT LONG-TERM CURRENT USE OF INSULIN (HCC): ICD-10-CM

## 2019-12-18 DIAGNOSIS — N18.6 ESRD (END STAGE RENAL DISEASE) (HCC): ICD-10-CM

## 2019-12-18 DIAGNOSIS — I10 ESSENTIAL HYPERTENSION: ICD-10-CM

## 2019-12-18 DIAGNOSIS — E78.2 MIXED HYPERLIPIDEMIA: ICD-10-CM

## 2019-12-18 PROCEDURE — 99214 OFFICE O/P EST MOD 30 MIN: CPT | Performed by: INTERNAL MEDICINE

## 2019-12-19 NOTE — PROGRESS NOTES
CHIEF COMPLAINT: Patient is here for follow up of Type 2 Diabetes Mellitus    HPI:     Tere Gallegos is a 68 y.o. female with Type 2 Diabetes Mellitus here for follow up.     Labs from 11/12/2019 show HbA1c is elevated at 8.7%    I initially saw her as a consult from Kathy CHAPA for poorly controlled diabetes at baseline while not taking any medications.  She has a history of end-stage renal disease and is on hemodialysis and also has a history of subclinical hypothyroidism and hyperlipidemia.   After her initial consultation visit I started her on Trulicity 0.75 mg weekly.    On follow-up she continues to stay on Trulicity 0.75 mg weekly for her diabetes.  She is still noncompliant with sugar monitoring and did not come back to the clinic to see the nurse for education with regards to glucose meter usage, despite instructions given last visit.      She finally brought her glucose meter with her today and finally received education and instruction from the medical assistant and nurse on how to use her glucose meter.      She denies hyperglycemic and hypoglycemic symptoms    She also had uncontrolled subclinical hypothyroidism at baseline with suboptimal TSH of 3.2 in April.  She finally picked up the new prescription of levothyroxine 50 MCG daily and she reports good compliance    She has hyperlipidemia that is fairly controlled and is taking atorvastatin and she is tolerating this medication well    She also has essential hypertension and is taking metoprolol, lisinopril and amlodipine and blood pressures are well controlled and she is tolerating her medications well    BG Diary:12/5/2019  Not testing    Weight has been stable    Diabetes Complications   Retinopathy: No known retinopathy.  Last eye exam: She is overdue for an eye exam  Neuropathy: Denies paresthesias or numbness in hands or feet. Denies any foot wounds.  Exercise: Minimal.  Diet: Fair.  Patient's medications, allergies, and  social histories were reviewed and updated as appropriate.    ROS:     CONS:     No fever, no chills   EYES:     No diplopia, no blurry vision   CV:           No chest pain, no palpitations   PULM:     No SOB, no cough, no hemoptysis.   GI:            No nausea, no vomiting, no diarrhea, no constipation   ENDO:     No polyuria, no polydipsia, no heat intolerance, no cold intolerance       Past Medical History:  Problem List:  2019-11: Hyperlipidemia  2019-07: Green stool  2019-04: Postmenopausal  2019-03: Itching  2019-03: Subclinical hypothyroidism  2018-12: Abdominal pain  2018-12: Hypertensive urgency  2018-12: Transaminitis  2018-11: LUQ pain  2018-01: No appetite  2017-10: Acute hypoxemic respiratory failure ([facility])  2017-10: ESRD (end stage renal disease) ([facility])  2017-10: Acute on chronic diastolic CHF (congestive heart failure)   ([facility])  2016-09: Encounter for cervical Pap smear with pelvic exam  2016-09: Chronic total occlusion of native coronary artery  2016-09: S/P coronary artery stent placement  2016-09: Encounter to establish care with new doctor  2016-08: Renal hypertension  2016-08: End stage renal failure on dialysis ([facility])  2016-08: RLS (restless legs syndrome)  2016-08: Type 2 diabetes mellitus with hyperglycemia, without long-  term current use of insulin ([facility])  2014-01: Hyperkalemia  2013-09: Essential hypertension  Kidney transplant candidate  Abnormal cardiovascular stress test      Past Surgical History:  Past Surgical History:   Procedure Laterality Date   • ZZZ CARDIAC CATH  9/7/2016    RCA stented with 2 Synergy drug-eluting stents.   • RECOVERY  8/16/2016    Procedure: CATH LAB [facility] WITH POSSIBLE DR. CASTILLO;  Surgeon: Recoveryonly Surgery;  Location: SURGERY PRE-POST PROC UNIT [facility];  Service:    • ZZZ CARDIAC CATH  8/16/16    100% RCA   • OTHER Left 2014    left arm upper extremity fistula   • OTHER ABDOMINAL SURGERY      left kidney removed due to cancer        Allergies:  Patient has no  known allergies.     Social History:  Social History     Tobacco Use   • Smoking status: Never Smoker   • Smokeless tobacco: Never Used   Substance Use Topics   • Alcohol use: No     Alcohol/week: 0.0 oz   • Drug use: No        Family History:   family history includes Diabetes in her brother and sister; Other in her sister.      PHYSICAL EXAM:   OBJECTIVE:  Vital signs: BP (!) 168/78 (BP Location: Left arm, Patient Position: Sitting)   Pulse 66   Ht 1.524 m (5')   Wt 59.5 kg (131 lb 3.2 oz)   LMP  (LMP Unknown)   SpO2 96%   BMI 25.62 kg/m²   GENERAL: Well-developed, well-nourished in no apparent distress.   EYE:  No ocular asymmetry, PERRLA  HENT: Pink, moist mucous membranes.    NECK: No thyromegaly.   CARDIOVASCULAR:  No murmurs  LUNGS: Clear breath sounds  ABDOMEN: Soft, nontender   EXTREMITIES: No clubbing, cyanosis, or edema.   NEUROLOGICAL: No gross focal motor abnormalities   LYMPH: No cervical adenopathy palpated.   SKIN: No rashes, lesions.     Labs:  Lab Results   Component Value Date/Time    HBA1C 8.7 (A) 11/12/2019 09:17 AM        Lab Results   Component Value Date/Time    WBC 3.9 (L) 03/05/2019 09:48 AM    RBC 4.08 (L) 03/05/2019 09:48 AM    HEMOGLOBIN 11.6 (L) 03/05/2019 09:48 AM    MCV 89.7 03/05/2019 09:48 AM    MCH 28.4 03/05/2019 09:48 AM    MCHC 31.7 (L) 03/05/2019 09:48 AM    RDW 51.4 (H) 03/05/2019 09:48 AM    MPV 11.1 03/05/2019 09:48 AM       Lab Results   Component Value Date/Time    SODIUM 140 11/12/2019 12:47 PM    POTASSIUM 4.7 11/12/2019 12:47 PM    CHLORIDE 96 11/12/2019 12:47 PM    CO2 32 11/12/2019 12:47 PM    ANION 12.0 (H) 11/12/2019 12:47 PM    GLUCOSE 156 (H) 11/12/2019 12:47 PM    BUN 27 (H) 11/12/2019 12:47 PM    CREATININE 6.85 (HH) 11/12/2019 12:47 PM    CALCIUM 10.1 11/12/2019 12:47 PM    ASTSGOT 20 11/12/2019 12:47 PM    ALTSGPT 20 11/12/2019 12:47 PM    TBILIRUBIN 0.5 11/12/2019 12:47 PM    ALBUMIN 5.2 (H) 11/12/2019 12:47 PM    TOTPROTEIN 8.3 (H) 11/12/2019 12:47 PM     GLOBULIN 3.1 11/12/2019 12:47 PM    AGRATIO 1.7 11/12/2019 12:47 PM       Lab Results   Component Value Date/Time    CHOLSTRLTOT 91 (L) 12/23/2018 0101    TRIGLYCERIDE 139 12/23/2018 0101    HDL 30 (A) 12/23/2018 0101    LDL 33 12/23/2018 0101       No results found for: MICROALBCALC, MALBCRT, MALBEXCR, VSLDYY67, MICROALBUR, MICRALB, UMICROALBUM, MICROALBTIM     Lab Results   Component Value Date/Time    TSHULTRASEN 3.220 11/12/2019 1247     No results found for: FREEDIR  No results found for: FREET3  No results found for: THYSTIMIG        ASSESSMENT/PLAN:     1. Type 2 diabetes mellitus with hyperglycemia, without long-term current use of insulin (HCC)  Uncontrolled secondary to noncompliance   She is now on a GLP-1 analog however she is still noncompliant with sugar monitoring  Patient was given education today on how to use a glucose meter  She was instructed to call us with sugar readings next week  Advised patient to watch her diet and carbohydrate intake and exercise regularly    We will plan for follow-up in 2 months with a repeat of her A1c and other labs      2. Subclinical hypothyroidism  Uncontrolled suboptimal TSH at baseline  Continue levothyroxine 50 MCG daily  Reviewed the importance of adherence to thyroid hormone replacement therapy.  We will plan to repeat her TSH level in 2 to 3 months    3. Mixed hyperlipidemia  Well-controlled, continue atorvastatin follow low-fat diet  We will repeat fasting lipids in 2 months    4. ESRD (end stage renal disease) (HCC)  Advised patient avoid potential nephrotoxins such as NSAIDs and IV contrast  Advised patient to remain well-hydrated    5. Essential hypertension  Well-controlled continue current medications we will check urine microalbumin creatinine ratio with next labs      Return in about 2 months (around 2/18/2020).      Thank you kindly for allowing me to participate in the diabetes care plan for this patient.    Jimmy Bloom MD, FACE,  ECNU  12/05/19    CC:   ANGELITA Concepcion

## 2020-01-06 RX ORDER — ROPINIROLE 0.25 MG/1
TABLET, FILM COATED ORAL
Qty: 90 TAB | Refills: 0 | Status: SHIPPED | OUTPATIENT
Start: 2020-01-06 | End: 2020-01-07

## 2020-01-07 RX ORDER — ROPINIROLE 0.25 MG/1
TABLET, FILM COATED ORAL
Qty: 90 TAB | Refills: 0 | Status: SHIPPED | OUTPATIENT
Start: 2020-01-07 | End: 2020-01-08 | Stop reason: SDUPTHER

## 2020-01-07 RX ORDER — FUROSEMIDE 40 MG/1
40 TABLET ORAL DAILY
Qty: 90 TAB | Refills: 3 | Status: SHIPPED | OUTPATIENT
Start: 2020-01-07 | End: 2021-03-02

## 2020-01-08 ENCOUNTER — OFFICE VISIT (OUTPATIENT)
Dept: MEDICAL GROUP | Facility: MEDICAL CENTER | Age: 70
End: 2020-01-08
Payer: MEDICARE

## 2020-01-08 VITALS
OXYGEN SATURATION: 97 % | TEMPERATURE: 98.3 F | DIASTOLIC BLOOD PRESSURE: 56 MMHG | HEART RATE: 66 BPM | WEIGHT: 133 LBS | SYSTOLIC BLOOD PRESSURE: 168 MMHG | HEIGHT: 60 IN | BODY MASS INDEX: 26.11 KG/M2

## 2020-01-08 DIAGNOSIS — G25.81 RLS (RESTLESS LEGS SYNDROME): ICD-10-CM

## 2020-01-08 DIAGNOSIS — E11.65 TYPE 2 DIABETES MELLITUS WITH HYPERGLYCEMIA, WITHOUT LONG-TERM CURRENT USE OF INSULIN (HCC): ICD-10-CM

## 2020-01-08 PROCEDURE — 99214 OFFICE O/P EST MOD 30 MIN: CPT | Performed by: NURSE PRACTITIONER

## 2020-01-08 RX ORDER — ROPINIROLE 1 MG/1
1 TABLET, FILM COATED ORAL 2 TIMES DAILY PRN
Qty: 90 TAB | Refills: 6 | Status: SHIPPED | OUTPATIENT
Start: 2020-01-08 | End: 2020-01-08

## 2020-01-08 RX ORDER — ROPINIROLE 1 MG/1
1 TABLET, FILM COATED ORAL 2 TIMES DAILY PRN
Qty: 90 TAB | Refills: 6 | Status: SHIPPED | OUTPATIENT
Start: 2020-01-08 | End: 2020-01-22

## 2020-01-09 NOTE — PROGRESS NOTES
Subjective:   Tere Gallegos is a 69 y.o. female here today for 3-month follow-up:    Type 2 diabetes mellitus with hyperglycemia, without long-term current use of insulin ([facility])  Chronic, uncontrolled.  Last A1c 8.7.  Since last visit patient has established with endocrinology.  She was started on Trulicity 1.5 mg injection once weekly.  She has started checking her blood sugars before and after every meal.  She has follow-up in 1 month.    RLS (restless legs syndrome)  Patient increased her ropinirole to 0.75 mg before dialysis and before bed.  She states that her symptoms did not improve at all, she still has significant restless legs during dialysis and at night.       Current medicines (including changes today)  Current Outpatient Medications   Medication Sig Dispense Refill   • ROPINIRole (REQUIP) 1 MG Tab Take 1 Tab by mouth 2 times a day as needed (take 1 tablet before dialysis and before bed as needed for restless legs). 90 Tab 6   • furosemide (LASIX) 40 MG Tab Take 1 Tab by mouth every day. Hold for SBP less than 100 90 Tab 3   • levothyroxine (SYNTHROID) 50 MCG Tab TAKE 1 TAB BY MOUTH EVERY MORNING ON AN EMPTY STOMACH. 30 Tab 1   • Blood Glucose Monitoring Suppl Device Meter: Dispense Device of Insurance Preference. Sig. Use as directed for blood sugar monitoring. #1. NR. 1 Device 0   • Blood Glucose Test Strips Please supply what is covered by insurance  For glucose testing 4 times a day 150 Each 6   • Blood Glucose Monitoring Suppl (ACCU-CHEK FELICIA PLUS) w/Device Kit 1 Kit by Does not apply route 4 times a day. 1 Kit 1   • glucose blood (ACCU-CHEK FELICIA PLUS) strip 1 Strip by Other route 4 times a day. 150 Strip 11   • Dulaglutide (TRULICITY) 1.5 MG/0.5ML Solution Pen-injector Inject 0.5 mL as instructed every 7 days. 4 PEN 6   • B Complex-C-Folic Acid (RENAL-SALVATORE) 0.8 MG Tab Take 1 Tab by mouth 1 time daily as needed (before dialysis). 30 Tab 11   • metoprolol (LOPRESSOR) 100 MG Tab Take 1  Tab by mouth 2 times a day. 180 Tab 3   • atorvastatin (LIPITOR) 20 MG Tab Take 1 Tab by mouth every day. 90 Tab 3   • lisinopril (PRINIVIL) 20 MG Tab Take 2 Tabs by mouth 2 times a day. 180 Tab 3   • amLODIPine (NORVASC) 10 MG Tab Take 1 Tab by mouth every day. 90 Tab 3   • hydrOXYzine HCl (ATARAX) 50 MG Tab Take 1 Tab by mouth every 6 hours as needed for Itching. (Patient taking differently: Take 25-50 mg by mouth every 6 hours as needed for Itching.) 60 Tab 3   • CV[facility] ASPIRIN ADULT LOW DOSE 81 MG Chew Tab chewable tablet TOME FERNANDO TABLETA TODOS LOS MCCORMICK 90 Tab 3   • calcium carbonate (TUMS) 500 MG Chew Tab Take 1 Tab by mouth every 12 hours as needed (Indigestion). 30 Tab 0   • famotidine (PEPCID) 40 MG Tab Take 1 Tab by mouth every day. 60 Tab 0   • aspirin (ASA) 81 MG Chew Tab chewable tablet Take 81 mg by mouth every day.       No current facility-administered medications for this visit.      She  has a past medical history of Abnormal cardiovascular stress test, Blood clotting disorder (HCC), CAD (coronary artery disease), Dental disorder, Diabetes ([facility]), Dialysis patient ([facility]), High cholesterol, Hyperlipidemia, Hypertension, Kidney disease, and Kidney transplant candidate.    ROS   No chest pain, no shortness of breath, no abdominal pain  Positive ROS as per HPI.  All other systems reviewed and are negative.     Objective:     BP (!) 168/56 (BP Location: Right arm, Patient Position: Sitting)   Pulse 66   Temp 36.8 °C (98.3 °F) (Temporal)   Ht 1.524 m (5')   Wt 60.3 kg (133 lb)   SpO2 97%  Body mass index is 25.97 kg/m².     Physical Exam:  Constitutional: Alert, no distress.  Skin: Warm, dry, good turgor, no rashes in visible areas.  Eye: Equal, round, conjunctiva clear, lids normal.  ENMT: Lips without lesions, good dentition  Neck: Trachea midline  Respiratory: Unlabored respiratory effort  Cardiovascular: No edema.  Psych: Alert and oriented x3, normal affect and mood.      Assessment and Plan:   The  following treatment plan was discussed    1. RLS (restless legs syndrome)  Unstable  Increase ropinirole to 1 mg prior to dialysis and 1 mg before bed  Will send staff message to Dr. Burnett, patient's nephrologist to see what he would recommend as an alternative agent for her restless legs as the high-dose ropinirole does not seem to be helping.  I will send the alternative to the pharmacy after discussing with him.  - ROPINIRole (REQUIP) 1 MG Tab; Take 1 Tab by mouth 2 times a day as needed (take 1 tablet before dialysis and before bed as needed for restless legs).  Dispense: 90 Tab; Refill: 6    2. Type 2 diabetes mellitus with hyperglycemia, without long-term current use of insulin (HCC)  Unstable  Continue medication and follow-up per endocrinology  Continue checking blood sugars and keeping log, bring this to follow-up appointment with endocrinology.      Followup: Return in about 6 months (around 7/8/2020), or if symptoms worsen or fail to improve.    I have placed the below orders and discussed them with an approved delegating provider. The MA is performing the below orders under the direction of Dr. Davila

## 2020-01-09 NOTE — ASSESSMENT & PLAN NOTE
Patient increased her ropinirole to 0.75 mg before dialysis and before bed.  She states that her symptoms did not improve at all, she still has significant restless legs during dialysis and at night.

## 2020-01-09 NOTE — ASSESSMENT & PLAN NOTE
Chronic, uncontrolled.  Last A1c 8.7.  Since last visit patient has established with endocrinology.  She was started on Trulicity 1.5 mg injection once weekly.  She has started checking her blood sugars before and after every meal.  She has follow-up in 1 month.

## 2020-01-13 ENCOUNTER — TELEPHONE (OUTPATIENT)
Dept: MEDICAL GROUP | Facility: MEDICAL CENTER | Age: 70
End: 2020-01-13

## 2020-01-13 DIAGNOSIS — G25.81 RLS (RESTLESS LEGS SYNDROME): ICD-10-CM

## 2020-01-13 RX ORDER — GABAPENTIN 100 MG/1
100 CAPSULE ORAL
Qty: 90 CAP | Refills: 3 | Status: SHIPPED | OUTPATIENT
Start: 2020-01-13 | End: 2020-01-22

## 2020-01-14 NOTE — TELEPHONE ENCOUNTER
----- Message from Aftab Burnett M.D. sent at 1/13/2020 10:02 AM PST -----  Regarding: RE: Restless legs  Thanks for the note.  I agree with the approach.    In general for ESRD, RLS is considered to be first insufficient dialysis. I will work around this from our end, but generally increasing dialysis times significantly changes quality of life and is often not received well.    I agree with starting neurontin, consider 100mg daily for now and titrate up to 300 mg qHS.    Aftab  ----- Message -----  From: ANGELITA Concepcion  Sent: 1/8/2020   4:58 PM PST  To: Aftab Burnett M.D.  Subject: Restless legs                                    Hi Dr. Burnett,    I had a question regarding our mutual patient. I've been attempting to get her restless legs under control.  She has significant restless legs during dialysis and at night, and notices, and with ESRD/dialysis patients.  I have titrated her ropinirole up to 0.75 mg before dialysis and before bed, she has had no improvement in her symptoms.  I saw her today and increased it to 1 mg prior to dialysis and bed but I do not anticipate this working for her.    I wanted to know if you had a recommendation on a second line agent to try.  Was considering gabapentin but the dosing for ESRD/dialysis is a little confusing, I was considering starting her on 100 mg prior to dialysis and at bedtime.  Does this seem like a reasonable option or do you have any other recommendations?  Thanks for your time!    ANGELITA Concepcion

## 2020-01-14 NOTE — TELEPHONE ENCOUNTER
Please notify patient that I have discussed her restless legs with Dr. Burnett, her kidney specialist who has recommended a medication for her to take at night before bed to help with her restless legs.    I have sent this prescription over to the pharmacy, she can start this as soon as she picks it up, she can stop the ropinirole if the higher dose is not helping.    MALGORZATA Concepcion.

## 2020-01-20 ENCOUNTER — OFFICE VISIT (OUTPATIENT)
Dept: MEDICAL GROUP | Facility: MEDICAL CENTER | Age: 70
End: 2020-01-20
Payer: MEDICARE

## 2020-01-20 VITALS
WEIGHT: 133 LBS | BODY MASS INDEX: 26.11 KG/M2 | OXYGEN SATURATION: 92 % | SYSTOLIC BLOOD PRESSURE: 118 MMHG | HEART RATE: 84 BPM | HEIGHT: 60 IN | TEMPERATURE: 98.8 F | DIASTOLIC BLOOD PRESSURE: 78 MMHG

## 2020-01-20 DIAGNOSIS — R29.898 ARM HEAVINESS: ICD-10-CM

## 2020-01-20 DIAGNOSIS — G25.81 RLS (RESTLESS LEGS SYNDROME): ICD-10-CM

## 2020-01-20 DIAGNOSIS — R07.89 CHEST DISCOMFORT: ICD-10-CM

## 2020-01-20 PROCEDURE — 99214 OFFICE O/P EST MOD 30 MIN: CPT | Performed by: NURSE PRACTITIONER

## 2020-01-20 ASSESSMENT — PATIENT HEALTH QUESTIONNAIRE - PHQ9
CLINICAL INTERPRETATION OF PHQ2 SCORE: 1
5. POOR APPETITE OR OVEREATING: 1 - SEVERAL DAYS

## 2020-01-21 NOTE — PROGRESS NOTES
Subjective:   Tere Gallegos is a 69 y.o. female here today for follow up on RLS:    RLS (restless legs syndrome)  Patient started taking ropinerole 1 mg daily. Accidentally took 3 tablets at once and soon after developed heaviness and numbness in her left side. Feels it is affecting her left arm, chest, and left leg. Not having trouble moving her arms or legs, not feeling weakness. No falls. Not affecting her face. No headache. She started taking 1 mg daily and her symptoms have been improving, but are not gone all the way. She feels the ropinerol 1 mg has significantly helped with her restless legs.        Current medicines (including changes today)  Current Outpatient Medications   Medication Sig Dispense Refill   • gabapentin (NEURONTIN) 100 MG Cap Take 1 Cap by mouth every bedtime. 90 Cap 3   • ROPINIRole (REQUIP) 1 MG Tab Take 1 Tab by mouth 2 times a day as needed (take 1 tablet before dialysis and before bed as needed for restless legs). 90 Tab 6   • furosemide (LASIX) 40 MG Tab Take 1 Tab by mouth every day. Hold for SBP less than 100 90 Tab 3   • levothyroxine (SYNTHROID) 50 MCG Tab TAKE 1 TAB BY MOUTH EVERY MORNING ON AN EMPTY STOMACH. 30 Tab 1   • Blood Glucose Monitoring Suppl Device Meter: Dispense Device of Insurance Preference. Sig. Use as directed for blood sugar monitoring. #1. NR. 1 Device 0   • Blood Glucose Test Strips Please supply what is covered by insurance  For glucose testing 4 times a day 150 Each 6   • Blood Glucose Monitoring Suppl (ACCU-CHEK FELICIA PLUS) w/Device Kit 1 Kit by Does not apply route 4 times a day. 1 Kit 1   • glucose blood (ACCU-CHEK FELICIA PLUS) strip 1 Strip by Other route 4 times a day. 150 Strip 11   • Dulaglutide (TRULICITY) 1.5 MG/0.5ML Solution Pen-injector Inject 0.5 mL as instructed every 7 days. 4 PEN 6   • B Complex-C-Folic Acid (RENAL-SALVATORE) 0.8 MG Tab Take 1 Tab by mouth 1 time daily as needed (before dialysis). 30 Tab 11   • metoprolol (LOPRESSOR)  100 MG Tab Take 1 Tab by mouth 2 times a day. 180 Tab 3   • atorvastatin (LIPITOR) 20 MG Tab Take 1 Tab by mouth every day. 90 Tab 3   • lisinopril (PRINIVIL) 20 MG Tab Take 2 Tabs by mouth 2 times a day. 180 Tab 3   • amLODIPine (NORVASC) 10 MG Tab Take 1 Tab by mouth every day. 90 Tab 3   • hydrOXYzine HCl (ATARAX) 50 MG Tab Take 1 Tab by mouth every 6 hours as needed for Itching. (Patient taking differently: Take 25-50 mg by mouth every 6 hours as needed for Itching.) 60 Tab 3   • CVS ASPIRIN ADULT LOW DOSE 81 MG Chew Tab chewable tablet TOME FERNANDO TABLETA TODOS LOS MCCORMICK 90 Tab 3   • calcium carbonate (TUMS) 500 MG Chew Tab Take 1 Tab by mouth every 12 hours as needed (Indigestion). 30 Tab 0   • famotidine (PEPCID) 40 MG Tab Take 1 Tab by mouth every day. 60 Tab 0   • aspirin (ASA) 81 MG Chew Tab chewable tablet Take 81 mg by mouth every day.       No current facility-administered medications for this visit.      She  has a past medical history of Abnormal cardiovascular stress test, Blood clotting disorder ([facility]), CAD (coronary artery disease), Dental disorder, Diabetes ([facility]), Dialysis patient ([facility]), High cholesterol, Hyperlipidemia, Hypertension, Kidney disease, and Kidney transplant candidate.    ROS  No chest pain, no shortness of breath, no abdominal pain  Positive ROS as per HPI.  All other systems reviewed and are negative.     Objective:     /78 (BP Location: Right arm, Patient Position: Sitting, BP Cuff Size: Adult)   Pulse 84   Temp 37.1 °C (98.8 °F)   Ht 1.524 m (5')   Wt 60.3 kg (133 lb)   SpO2 92%  Body mass index is 25.97 kg/m².     Physical Exam:  Constitutional: Alert, no distress.  Skin: Warm, dry, good turgor, no rashes in visible areas.  Eye: Equal, round, conjunctiva clear, lids normal.  ENMT: Lips without lesions, good dentition  Neck: Trachea midline  Respiratory: Unlabored respiratory effort, lungs clear to auscultation, no wheezes, no ronchi.  Cardiovascular: Normal S1, S2, no  murmur, no edema.  Psych: Alert and oriented x3, normal affect and mood.  Neuro: CN 2-12 tested and grossly intact, strength testing in upper and lower extremities is 5/5 and equal bilaterally, Gross motor movement intact in all 4 extremities, Gross sensation intact to extremities and trunk, Gait grossly normal and not antalgic, no dysmetria on gross testing (finger to nose, heel to shin), no diadochokinesia on gross testing of rapid alternating movements (hands, feet), no pronator drift.     EKG: NSR, LVH, pronlonged QTi, Rate 76, no ectopy or ST changes.       Assessment and Plan:   The following treatment plan was discussed    1. Arm heaviness  Unstable  Possibly side effect from too high dose of ropinerole  Stop Ropinerol for at least 1 week to see if symptoms improve  May use gabapentin which was sent to the pharmacy but not picked up.   EKG unchanged from prior  Neuro exam unremarkable  Strict ER precautions given for worsening symptoms  - EKG - Clinic Performed    2. Chest discomfort  See #1  - EKG - Clinic Performed    3. RLS (restless legs syndrome)  Stable  Hold ropinerole for now  May use gabapentin 100 mg      Followup: Return in about 2 weeks (around 2/3/2020) for Left sided symptoms, RLS.    I have placed the below orders and discussed them with an approved delegating provider. The MA is performing the below orders under the direction of Dr. Davila

## 2020-01-21 NOTE — ASSESSMENT & PLAN NOTE
Patient started taking ropinerole 1 mg daily. Accidentally took 3 tablets at once and soon after developed heaviness and numbness in her left side. Feels it is affecting her left arm, chest, and left leg. Not having trouble moving her arms or legs, not feeling weakness. No falls. Not affecting her face. No headache. She started taking 1 mg daily and her symptoms have been improving, but are not gone all the way. She feels the ropinerol 1 mg has significantly helped with her restless legs.

## 2020-01-22 ENCOUNTER — HOSPITAL ENCOUNTER (INPATIENT)
Facility: MEDICAL CENTER | Age: 70
LOS: 1 days | DRG: 189 | End: 2020-01-24
Attending: EMERGENCY MEDICINE | Admitting: INTERNAL MEDICINE
Payer: MEDICARE

## 2020-01-22 ENCOUNTER — APPOINTMENT (OUTPATIENT)
Dept: RADIOLOGY | Facility: MEDICAL CENTER | Age: 70
DRG: 189 | End: 2020-01-22
Attending: EMERGENCY MEDICINE
Payer: MEDICARE

## 2020-01-22 DIAGNOSIS — D64.9 ANEMIA, UNSPECIFIED TYPE: ICD-10-CM

## 2020-01-22 DIAGNOSIS — J81.0 ACUTE PULMONARY EDEMA (HCC): ICD-10-CM

## 2020-01-22 DIAGNOSIS — N18.6 ESRD (END STAGE RENAL DISEASE) (HCC): ICD-10-CM

## 2020-01-22 PROBLEM — R94.31 QT PROLONGATION: Status: ACTIVE | Noted: 2020-01-22

## 2020-01-22 LAB
ALBUMIN SERPL BCP-MCNC: 3.9 G/DL (ref 3.2–4.9)
ALBUMIN/GLOB SERPL: 1.4 G/DL
ALP SERPL-CCNC: 79 U/L (ref 30–99)
ALT SERPL-CCNC: 12 U/L (ref 2–50)
ANION GAP SERPL CALC-SCNC: 13 MMOL/L (ref 0–11.9)
AST SERPL-CCNC: 22 U/L (ref 12–45)
BASOPHILS # BLD AUTO: 0.6 % (ref 0–1.8)
BASOPHILS # BLD: 0.02 K/UL (ref 0–0.12)
BILIRUB SERPL-MCNC: 0.6 MG/DL (ref 0.1–1.5)
BUN SERPL-MCNC: 12 MG/DL (ref 8–22)
CALCIUM SERPL-MCNC: 9.1 MG/DL (ref 8.4–10.2)
CHLORIDE SERPL-SCNC: 93 MMOL/L (ref 96–112)
CO2 SERPL-SCNC: 28 MMOL/L (ref 20–33)
CREAT SERPL-MCNC: 3.76 MG/DL (ref 0.5–1.4)
EKG IMPRESSION: NORMAL
EOSINOPHIL # BLD AUTO: 0.08 K/UL (ref 0–0.51)
EOSINOPHIL NFR BLD: 2.6 % (ref 0–6.9)
ERYTHROCYTE [DISTWIDTH] IN BLOOD BY AUTOMATED COUNT: 42 FL (ref 35.9–50)
GLOBULIN SER CALC-MCNC: 2.7 G/DL (ref 1.9–3.5)
GLUCOSE SERPL-MCNC: 99 MG/DL (ref 65–99)
HAV IGM SERPL QL IA: NEGATIVE
HBV CORE IGM SER QL: NEGATIVE
HBV SURFACE AB SERPL IA-ACNC: 22.38 MIU/ML (ref 0–10)
HBV SURFACE AG SER QL: NEGATIVE
HCT VFR BLD AUTO: 23.6 % (ref 37–47)
HCV AB SER QL: NEGATIVE
HGB BLD-MCNC: 8.1 G/DL (ref 12–16)
IMM GRANULOCYTES # BLD AUTO: 0.02 K/UL (ref 0–0.11)
IMM GRANULOCYTES NFR BLD AUTO: 0.6 % (ref 0–0.9)
LYMPHOCYTES # BLD AUTO: 0.44 K/UL (ref 1–4.8)
LYMPHOCYTES NFR BLD: 14.2 % (ref 22–41)
MCH RBC QN AUTO: 29.9 PG (ref 27–33)
MCHC RBC AUTO-ENTMCNC: 34.3 G/DL (ref 33.6–35)
MCV RBC AUTO: 87.1 FL (ref 81.4–97.8)
MONOCYTES # BLD AUTO: 0.35 K/UL (ref 0–0.85)
MONOCYTES NFR BLD AUTO: 11.3 % (ref 0–13.4)
NEUTROPHILS # BLD AUTO: 2.18 K/UL (ref 2–7.15)
NEUTROPHILS NFR BLD: 70.7 % (ref 44–72)
NRBC # BLD AUTO: 0 K/UL
NRBC BLD-RTO: 0 /100 WBC
PLATELET # BLD AUTO: 127 K/UL (ref 164–446)
PMV BLD AUTO: 10.2 FL (ref 9–12.9)
POTASSIUM SERPL-SCNC: 4 MMOL/L (ref 3.6–5.5)
PROT SERPL-MCNC: 6.6 G/DL (ref 6–8.2)
RBC # BLD AUTO: 2.71 M/UL (ref 4.2–5.4)
SODIUM SERPL-SCNC: 134 MMOL/L (ref 135–145)
TROPONIN T SERPL-MCNC: 54 NG/L (ref 6–19)
TROPONIN T SERPL-MCNC: 56 NG/L (ref 6–19)
WBC # BLD AUTO: 3.1 K/UL (ref 4.8–10.8)

## 2020-01-22 PROCEDURE — 80053 COMPREHEN METABOLIC PANEL: CPT

## 2020-01-22 PROCEDURE — 700111 HCHG RX REV CODE 636 W/ 250 OVERRIDE (IP): Performed by: INTERNAL MEDICINE

## 2020-01-22 PROCEDURE — 93005 ELECTROCARDIOGRAM TRACING: CPT | Performed by: EMERGENCY MEDICINE

## 2020-01-22 PROCEDURE — A9270 NON-COVERED ITEM OR SERVICE: HCPCS | Performed by: INTERNAL MEDICINE

## 2020-01-22 PROCEDURE — 93005 ELECTROCARDIOGRAM TRACING: CPT

## 2020-01-22 PROCEDURE — 99285 EMERGENCY DEPT VISIT HI MDM: CPT

## 2020-01-22 PROCEDURE — 80074 ACUTE HEPATITIS PANEL: CPT

## 2020-01-22 PROCEDURE — G0378 HOSPITAL OBSERVATION PER HR: HCPCS

## 2020-01-22 PROCEDURE — 84484 ASSAY OF TROPONIN QUANT: CPT

## 2020-01-22 PROCEDURE — 99220 PR INITIAL OBSERVATION CARE,LEVL III: CPT | Performed by: INTERNAL MEDICINE

## 2020-01-22 PROCEDURE — 86706 HEP B SURFACE ANTIBODY: CPT

## 2020-01-22 PROCEDURE — 304561 HCHG STAT O2

## 2020-01-22 PROCEDURE — 85025 COMPLETE CBC W/AUTO DIFF WBC: CPT

## 2020-01-22 PROCEDURE — 71045 X-RAY EXAM CHEST 1 VIEW: CPT

## 2020-01-22 PROCEDURE — 99215 OFFICE O/P EST HI 40 MIN: CPT | Performed by: INTERNAL MEDICINE

## 2020-01-22 PROCEDURE — 90935 HEMODIALYSIS ONE EVALUATION: CPT

## 2020-01-22 PROCEDURE — 96372 THER/PROPH/DIAG INJ SC/IM: CPT

## 2020-01-22 PROCEDURE — 700102 HCHG RX REV CODE 250 W/ 637 OVERRIDE(OP): Performed by: INTERNAL MEDICINE

## 2020-01-22 PROCEDURE — 36415 COLL VENOUS BLD VENIPUNCTURE: CPT

## 2020-01-22 RX ORDER — HEPARIN SODIUM 1000 [USP'U]/ML
1500 INJECTION, SOLUTION INTRAVENOUS; SUBCUTANEOUS
Status: DISCONTINUED | OUTPATIENT
Start: 2020-01-22 | End: 2020-01-24 | Stop reason: HOSPADM

## 2020-01-22 RX ORDER — METOPROLOL TARTRATE 50 MG/1
100 TABLET, FILM COATED ORAL 2 TIMES DAILY
Status: DISCONTINUED | OUTPATIENT
Start: 2020-01-22 | End: 2020-01-24 | Stop reason: HOSPADM

## 2020-01-22 RX ORDER — ATORVASTATIN CALCIUM 10 MG/1
20 TABLET, FILM COATED ORAL NIGHTLY
Status: DISCONTINUED | OUTPATIENT
Start: 2020-01-22 | End: 2020-01-24 | Stop reason: HOSPADM

## 2020-01-22 RX ORDER — ASPIRIN 81 MG/1
81 TABLET, CHEWABLE ORAL DAILY
Status: DISCONTINUED | OUTPATIENT
Start: 2020-01-22 | End: 2020-01-24 | Stop reason: HOSPADM

## 2020-01-22 RX ORDER — AMOXICILLIN 250 MG
2 CAPSULE ORAL 2 TIMES DAILY
Status: DISCONTINUED | OUTPATIENT
Start: 2020-01-22 | End: 2020-01-24 | Stop reason: HOSPADM

## 2020-01-22 RX ORDER — BISACODYL 10 MG
10 SUPPOSITORY, RECTAL RECTAL
Status: DISCONTINUED | OUTPATIENT
Start: 2020-01-22 | End: 2020-01-24 | Stop reason: HOSPADM

## 2020-01-22 RX ORDER — LEVOTHYROXINE SODIUM 0.05 MG/1
50 TABLET ORAL
Status: DISCONTINUED | OUTPATIENT
Start: 2020-01-23 | End: 2020-01-24 | Stop reason: HOSPADM

## 2020-01-22 RX ORDER — ATORVASTATIN CALCIUM 20 MG/1
20 TABLET, FILM COATED ORAL NIGHTLY
COMMUNITY
End: 2020-10-07 | Stop reason: SDUPTHER

## 2020-01-22 RX ORDER — LISINOPRIL 20 MG/1
40 TABLET ORAL 2 TIMES DAILY
Status: DISCONTINUED | OUTPATIENT
Start: 2020-01-23 | End: 2020-01-24 | Stop reason: HOSPADM

## 2020-01-22 RX ORDER — ROPINIROLE 1 MG/1
1 TABLET, FILM COATED ORAL SEE ADMIN INSTRUCTIONS
COMMUNITY
End: 2020-05-11 | Stop reason: SDUPTHER

## 2020-01-22 RX ORDER — FOLIC ACID/VIT B COMPLEX AND C 0.8 MG
1 TABLET ORAL SEE ADMIN INSTRUCTIONS
COMMUNITY
End: 2020-11-18

## 2020-01-22 RX ORDER — HEPARIN SODIUM 5000 [USP'U]/ML
5000 INJECTION, SOLUTION INTRAVENOUS; SUBCUTANEOUS EVERY 8 HOURS
Status: DISCONTINUED | OUTPATIENT
Start: 2020-01-22 | End: 2020-01-24 | Stop reason: HOSPADM

## 2020-01-22 RX ORDER — ROPINIROLE 0.5 MG/1
1 TABLET, FILM COATED ORAL
Status: DISCONTINUED | OUTPATIENT
Start: 2020-01-22 | End: 2020-01-24 | Stop reason: HOSPADM

## 2020-01-22 RX ORDER — POLYETHYLENE GLYCOL 3350 17 G/17G
1 POWDER, FOR SOLUTION ORAL
Status: DISCONTINUED | OUTPATIENT
Start: 2020-01-22 | End: 2020-01-24 | Stop reason: HOSPADM

## 2020-01-22 RX ORDER — ENALAPRILAT 1.25 MG/ML
1.25 INJECTION INTRAVENOUS EVERY 6 HOURS PRN
Status: DISCONTINUED | OUTPATIENT
Start: 2020-01-22 | End: 2020-01-24 | Stop reason: HOSPADM

## 2020-01-22 RX ORDER — ACETAMINOPHEN 325 MG/1
650 TABLET ORAL EVERY 6 HOURS PRN
Status: DISCONTINUED | OUTPATIENT
Start: 2020-01-22 | End: 2020-01-24 | Stop reason: HOSPADM

## 2020-01-22 RX ORDER — AMLODIPINE BESYLATE 5 MG/1
10 TABLET ORAL DAILY
Status: DISCONTINUED | OUTPATIENT
Start: 2020-01-23 | End: 2020-01-24 | Stop reason: HOSPADM

## 2020-01-22 RX ADMIN — METOPROLOL TARTRATE 100 MG: 50 TABLET, FILM COATED ORAL at 19:11

## 2020-01-22 RX ADMIN — ROPINIROLE HYDROCHLORIDE 1 MG: 0.5 TABLET, FILM COATED ORAL at 21:12

## 2020-01-22 RX ADMIN — HEPARIN SODIUM 1500 UNITS: 1000 INJECTION, SOLUTION INTRAVENOUS; SUBCUTANEOUS at 20:24

## 2020-01-22 ASSESSMENT — ENCOUNTER SYMPTOMS
TREMORS: 0
WEAKNESS: 0
NAUSEA: 0
BACK PAIN: 0
SEIZURES: 0
DIAPHORESIS: 0
ABDOMINAL PAIN: 0
MYALGIAS: 0
DIARRHEA: 0
CONSTIPATION: 1
CHILLS: 0
INSOMNIA: 0
COUGH: 1
NERVOUS/ANXIOUS: 0
SHORTNESS OF BREATH: 1
ORTHOPNEA: 1
SPUTUM PRODUCTION: 0
STRIDOR: 0
HEADACHES: 0
FEVER: 0
DEPRESSION: 0
PALPITATIONS: 0
DIZZINESS: 0
WHEEZING: 0
SORE THROAT: 0
BLURRED VISION: 1

## 2020-01-22 ASSESSMENT — COPD QUESTIONNAIRES
HAVE YOU SMOKED AT LEAST 100 CIGARETTES IN YOUR ENTIRE LIFE: NO/DON'T KNOW
DURING THE PAST 4 WEEKS HOW MUCH DID YOU FEEL SHORT OF BREATH: NONE/LITTLE OF THE TIME
COPD SCREENING SCORE: 2
DURING THE PAST 4 WEEKS HOW MUCH DID YOU FEEL SHORT OF BREATH: NONE/LITTLE OF THE TIME
COPD SCREENING SCORE: 2
IN THE PAST 12 MONTHS DO YOU DO LESS THAN YOU USED TO BECAUSE OF YOUR BREATHING PROBLEMS: DISAGREE/UNSURE
DO YOU EVER COUGH UP ANY MUCUS OR PHLEGM?: NO/ONLY WITH OCCASIONAL COLDS OR INFECTIONS
HAVE YOU SMOKED AT LEAST 100 CIGARETTES IN YOUR ENTIRE LIFE: NO/DON'T KNOW
DO YOU EVER COUGH UP ANY MUCUS OR PHLEGM?: NO/ONLY WITH OCCASIONAL COLDS OR INFECTIONS

## 2020-01-22 ASSESSMENT — LIFESTYLE VARIABLES
EVER_SMOKED: NEVER
ALCOHOL_USE: NO
HAVE YOU EVER FELT YOU SHOULD CUT DOWN ON YOUR DRINKING: NO
TOTAL SCORE: 0
ON A TYPICAL DAY WHEN YOU DRINK ALCOHOL HOW MANY DRINKS DO YOU HAVE: 0
HOW MANY TIMES IN THE PAST YEAR HAVE YOU HAD 5 OR MORE DRINKS IN A DAY: 0
TOTAL SCORE: 0
DOES PATIENT WANT TO STOP DRINKING: NO
CONSUMPTION TOTAL: NEGATIVE
EVER HAD A DRINK FIRST THING IN THE MORNING TO STEADY YOUR NERVES TO GET RID OF A HANGOVER: NO
EVER FELT BAD OR GUILTY ABOUT YOUR DRINKING: NO
AVERAGE NUMBER OF DAYS PER WEEK YOU HAVE A DRINK CONTAINING ALCOHOL: 0
TOTAL SCORE: 0
EVER_SMOKED: NEVER
HAVE PEOPLE ANNOYED YOU BY CRITICIZING YOUR DRINKING: NO

## 2020-01-22 NOTE — ED NOTES
Med rec updated and complete  Allergies reviewed  Interviewed pt with family at bedside with permission from pt.  Pts family had RX bottles at bedside, went over RX bottles and returned RX bottles back to pts family  Pt and pts family reports no antibiotics in the last 2 weeks

## 2020-01-22 NOTE — ED PROVIDER NOTES
ED Provider Note    CHIEF COMPLAINT  Chief Complaint   Patient presents with   • Chest Pain     started 8 days ago   • Shortness of Breath     x 8 days        HPI  Tere Gallegos is a 69 y.o. female who presents to the ED with complaints of shortness of breath.  The patient does have a history of end-stage renal disease on dialysis 3 days a week.  The patient states that she is been having just a continuous sensation of shortness of breath especially with exertion for the past 8 days.  Patient denies any overt chest discomfort just describes a sensation of shortness of breath.  Patient did see her primary care physician 2 days ago but apparently no studies were done.  The patient presents today because of discontinued symptoms denies any overt fevers chills describes a sensation of shortness of breath denies any other symptoms presents for evaluation.    REVIEW OF SYSTEMS  See HPI for further details. All other systems are negative.     PAST MEDICAL HISTORY  Past Medical History:   Diagnosis Date   • Abnormal cardiovascular stress test    • Blood clotting disorder (HCC)     with AVF   • CAD (coronary artery disease)    • Dental disorder     partial dentures- uppers   • Diabetes (HCC)     oral medication   • Dialysis patient (HCC)     [facility]   • High cholesterol    • Hyperlipidemia    • Hypertension    • Kidney disease     renal failure , on dialysis, M, W, F.   • Kidney transplant candidate        FAMILY HISTORY  Family History   Problem Relation Age of Onset   • Diabetes Sister    • Other Sister         liver disease   • Diabetes Brother    • Heart Disease Neg Hx      Patient's family history has been discussed and is been found to be noncontributory to his present illness  SOCIAL HISTORY  Social History     Socioeconomic History   • Marital status:      Spouse name: Not on file   • Number of children: Not on file   • Years of education: Not on file   • Highest education level:  Not on file   Occupational History   • Not on file   Social Needs   • Financial resource strain: Not on file   • Food insecurity:     Worry: Not on file     Inability: Not on file   • Transportation needs:     Medical: Not on file     Non-medical: Not on file   Tobacco Use   • Smoking status: Never Smoker   • Smokeless tobacco: Never Used   Substance and Sexual Activity   • Alcohol use: No     Alcohol/week: 0.0 oz   • Drug use: No   • Sexual activity: Never   Lifestyle   • Physical activity:     Days per week: Not on file     Minutes per session: Not on file   • Stress: Not on file   Relationships   • Social connections:     Talks on phone: Not on file     Gets together: Not on file     Attends Buddhism service: Not on file     Active member of club or organization: Not on file     Attends meetings of clubs or organizations: Not on file     Relationship status: Not on file   • Intimate partner violence:     Fear of current or ex partner: Not on file     Emotionally abused: Not on file     Physically abused: Not on file     Forced sexual activity: Not on file   Other Topics Concern   • Not on file   Social History Narrative   • Not on file      LIZETT ConcepcionPOctavianoROctavianoNOctaviano      SURGICAL HISTORY  Past Surgical History:   Procedure Laterality Date   • ZZZ CARDIAC CATH  9/7/2016    RCA stented with 2 Synergy drug-eluting stents.   • RECOVERY  8/16/2016    Procedure: CATH LAB [facility] WITH POSSIBLE DR. CASTILLO;  Surgeon: [facility];  Location: SURGERY PRE-POST PROC UNIT [facility];  Service:    • ZZZ CARDIAC CATH  8/16/16    100% RCA   • OTHER Left 2014    left arm upper extremity fistula   • OTHER ABDOMINAL SURGERY      left kidney removed due to cancer       CURRENT MEDICATIONS  Home Medications     Reviewed by Veronica Parra (Pharmacy Tech) on 01/22/20 at 1445  Med List Status: Complete   Medication Last Dose Status   amLODIPine (NORVASC) 10 MG Tab 1/22/2020 Active   aspirin (ASA) 81 MG Chew Tab chewable  tablet 1/21/2020 Active   atorvastatin (LIPITOR) 20 MG Tab 1/21/2020 Active   B Complex-C-Folic Acid (RENAL-SALVATORE) 0.8 MG Tab > 2 weeks Active   Dulaglutide (TRULICITY) 1.5 MG/0.5ML Solution Pen-injector 1/21/2020 Active   furosemide (LASIX) 40 MG Tab 1/21/2020 Active   levothyroxine (SYNTHROID) 50 MCG Tab 1/21/2020 Active   lisinopril (PRINIVIL) 20 MG Tab 1/22/2020 Active   metoprolol (LOPRESSOR) 100 MG Tab 1/22/2020 Active   ROPINIRole (REQUIP) 1 MG Tab 1/20/2020 Active                ALLERGIES  No Known Allergies    PHYSICAL EXAM  VITAL SIGNS: BP (!) 196/68   Pulse 65   Temp 36.3 °C (97.4 °F) (Temporal)   Resp (!) 23   Ht 1.524 m (5')   Wt 54.8 kg (120 lb 13 oz)   LMP  (LMP Unknown)   SpO2 92%   BMI 23.59 kg/m²    Pulse Oximetry was obtained. It showed a reading of Pulse Oximetry: 95 %.  I interpreted this as non-hypoxic.     Constitutional: Well developed, Well nourished, No acute distress, Non-toxic appearance.   HENT: Normocephalic, Atraumatic, Bilateral external ears normal, bilateral tympanic membranes normal, Oropharynx moist mucous membranes, No oral exudates, Nose normal.   Eyes:  conjunctiva is normal, there are no signs of exudate.   Neck: Supple, no cervical lymphadenopathy, no meningeal signs..   Lymphatic: No lymphadenopathy noted.   Cardiovascular: Regular rate and rhythm without murmurs gallops or rubs.   Thorax & Lungs: Lungs are clear to auscultation bilaterally but diminished, there are no wheezes no rales. Chest wall is nontender.  Abdomen: Soft, nontender nondistended. Bowel sounds are present.   Skin: Warm, Dry, No erythema,   Back: No tenderness, No CVA tenderness.  Musculoskeletal: Good range of motion in all major joints. No tenderness to palpation or major deformities noted. Intact distal pulses, no clubbing, no cyanosis, no edema     Neurologic: Alert & oriented x 3, Normal motor function, Normal sensory function, No focal deficits noted.   Psychiatric: Affect normal, Judgment  normal, Mood normal.     EKG  Interpreted below by myself    RADIOLOGY/PROCEDURES  DX-CHEST-PORTABLE (1 VIEW)   Final Result      1.  Cardiomegaly with pulmonary edema.      2.  Bibasilar atelectasis and small bilateral pleural effusions.      EC-ECHOCARDIOGRAM COMPLETE W/O CONT    (Results Pending)       Results for orders placed or performed during the hospital encounter of 01/22/20   CBC with Differential   Result Value Ref Range    WBC 3.1 (L) 4.8 - 10.8 K/uL    RBC 2.71 (L) 4.20 - 5.40 M/uL    Hemoglobin 8.1 (L) 12.0 - 16.0 g/dL    Hematocrit 23.6 (L) 37.0 - 47.0 %    MCV 87.1 81.4 - 97.8 fL    MCH 29.9 27.0 - 33.0 pg    MCHC 34.3 33.6 - 35.0 g/dL    RDW 42.0 35.9 - 50.0 fL    Platelet Count 127 (L) 164 - 446 K/uL    MPV 10.2 9.0 - 12.9 fL    Neutrophils-Polys 70.70 44.00 - 72.00 %    Lymphocytes 14.20 (L) 22.00 - 41.00 %    Monocytes 11.30 0.00 - 13.40 %    Eosinophils 2.60 0.00 - 6.90 %    Basophils 0.60 0.00 - 1.80 %    Immature Granulocytes 0.60 0.00 - 0.90 %    Nucleated RBC 0.00 /100 WBC    Neutrophils (Absolute) 2.18 2.00 - 7.15 K/uL    Lymphs (Absolute) 0.44 (L) 1.00 - 4.80 K/uL    Monos (Absolute) 0.35 0.00 - 0.85 K/uL    Eos (Absolute) 0.08 0.00 - 0.51 K/uL    Baso (Absolute) 0.02 0.00 - 0.12 K/uL    Immature Granulocytes (abs) 0.02 0.00 - 0.11 K/uL    NRBC (Absolute) 0.00 K/uL   Complete Metabolic Panel (CMP)   Result Value Ref Range    Sodium 134 (L) 135 - 145 mmol/L    Potassium 4.0 3.6 - 5.5 mmol/L    Chloride 93 (L) 96 - 112 mmol/L    Co2 28 20 - 33 mmol/L    Anion Gap 13.0 (H) 0.0 - 11.9    Glucose 99 65 - 99 mg/dL    Bun 12 8 - 22 mg/dL    Creatinine 3.76 (H) 0.50 - 1.40 mg/dL    Calcium 9.1 8.4 - 10.2 mg/dL    AST(SGOT) 22 12 - 45 U/L    ALT(SGPT) 12 2 - 50 U/L    Alkaline Phosphatase 79 30 - 99 U/L    Total Bilirubin 0.6 0.1 - 1.5 mg/dL    Albumin 3.9 3.2 - 4.9 g/dL    Total Protein 6.6 6.0 - 8.2 g/dL    Globulin 2.7 1.9 - 3.5 g/dL    A-G Ratio 1.4 g/dL   Troponin   Result Value Ref Range     Troponin T 56 (H) 6 - 19 ng/L   ESTIMATED GFR   Result Value Ref Range    GFR If  14 (A) >60 mL/min/1.73 m 2    GFR If Non  12 (A) >60 mL/min/1.73 m 2   EKG   Result Value Ref Range    Report       [facility]    Test Date:  2020  Pt Name:    DIOR NICHOLS    Department: EDSM  MRN:        [id number]                      Room:  Gender:     Female                       Technician: [facility]  :        1950                   Requested By:ER TRIAGE PROTOCOL  Order #:    [id number]                    Reading MD: AKBAR MURDOCK MD    Measurements  Intervals                                Axis  Rate:       73                           P:          -13  ND:         176                          QRS:        74  QRSD:       90                           T:          37  QT:         471  QTc:        519    Interpretive Statements  Sinus rhythm  Probable LVH with secondary repol abnrm  Prolonged QT interval  Compared to ECG 2018 19:05:31  Prolonged QT interval now present  Electronically Signed On 2020 13:25:41 PST by AKBAR MURDOCK MD           COURSE & MEDICAL DECISION MAKING  Pertinent Labs & Imaging studies reviewed. (See chart for details)  Patient presents emerged department for evaluation.  Initial pulse oximetry was 87% on room air was placed on oxygen.  Chest x-ray does show cardiomegaly with pulmonary edema.  Troponin is slightly elevated.  The patient does have a history of end-stage renal disease currently is on dialysis 3 days a week did go to dialysis today but she does not remember how much fluid they took off of her.  At this point I do feel the patient will require further fluid removal and is possible the patient may have had a cardiac event causing worsening cardiac output.  At this point will admit the patient for evaluation of congestive heart failure I have also spoke with Dr. Klein who is on for  nephrology for further dialysis due to the continued pulmonary edema.    FINAL IMPRESSION  1. Acute pulmonary edema (HCC)     2. ESRD (end stage renal disease) (HCC)     3. Anemia, unspecified type                 Electronically signed by: Sanford Zayas M.D., 1/22/2020 1:24 PM

## 2020-01-22 NOTE — ED TRIAGE NOTES
Chief Complaint   Patient presents with   • Chest Pain     started 8 days ago   • Shortness of Breath     x 8 days     BP (!) 181/73   Pulse 72   Temp 36.3 °C (97.4 °F) (Temporal)   Resp 18   LMP  (LMP Unknown)   SpO2 88%     Pt completed Dialysis this am without complications.  Pt placed on 2L oxygen via NC for SPO2 < 88%.

## 2020-01-23 ENCOUNTER — APPOINTMENT (OUTPATIENT)
Dept: CARDIOLOGY | Facility: MEDICAL CENTER | Age: 70
DRG: 189 | End: 2020-01-23
Attending: INTERNAL MEDICINE
Payer: MEDICARE

## 2020-01-23 LAB
ANION GAP SERPL CALC-SCNC: 9 MMOL/L (ref 0–11.9)
BASOPHILS # BLD AUTO: 0.6 % (ref 0–1.8)
BASOPHILS # BLD: 0.02 K/UL (ref 0–0.12)
BUN SERPL-MCNC: 10 MG/DL (ref 8–22)
CALCIUM SERPL-MCNC: 9.9 MG/DL (ref 8.5–10.5)
CHLORIDE SERPL-SCNC: 96 MMOL/L (ref 96–112)
CO2 SERPL-SCNC: 31 MMOL/L (ref 20–33)
CREAT SERPL-MCNC: 3 MG/DL (ref 0.5–1.4)
EOSINOPHIL # BLD AUTO: 0.07 K/UL (ref 0–0.51)
EOSINOPHIL NFR BLD: 2.3 % (ref 0–6.9)
ERYTHROCYTE [DISTWIDTH] IN BLOOD BY AUTOMATED COUNT: 42.1 FL (ref 35.9–50)
GLUCOSE SERPL-MCNC: 98 MG/DL (ref 65–99)
HCT VFR BLD AUTO: 23.8 % (ref 37–47)
HGB BLD-MCNC: 8.2 G/DL (ref 12–16)
IMM GRANULOCYTES # BLD AUTO: 0 K/UL (ref 0–0.11)
IMM GRANULOCYTES NFR BLD AUTO: 0 % (ref 0–0.9)
LV EJECT FRACT  99904: 60
LV EJECT FRACT MOD 2C 99903: 54.74
LV EJECT FRACT MOD 4C 99902: 56.09
LV EJECT FRACT MOD BP 99901: 57.32
LYMPHOCYTES # BLD AUTO: 0.5 K/UL (ref 1–4.8)
LYMPHOCYTES NFR BLD: 16.2 % (ref 22–41)
MCH RBC QN AUTO: 30.1 PG (ref 27–33)
MCHC RBC AUTO-ENTMCNC: 34.5 G/DL (ref 33.6–35)
MCV RBC AUTO: 87.5 FL (ref 81.4–97.8)
MONOCYTES # BLD AUTO: 0.32 K/UL (ref 0–0.85)
MONOCYTES NFR BLD AUTO: 10.4 % (ref 0–13.4)
NEUTROPHILS # BLD AUTO: 2.18 K/UL (ref 2–7.15)
NEUTROPHILS NFR BLD: 70.5 % (ref 44–72)
NRBC # BLD AUTO: 0 K/UL
NRBC BLD-RTO: 0 /100 WBC
PLATELET # BLD AUTO: 132 K/UL (ref 164–446)
PMV BLD AUTO: 11.2 FL (ref 9–12.9)
POTASSIUM SERPL-SCNC: 4.1 MMOL/L (ref 3.6–5.5)
RBC # BLD AUTO: 2.72 M/UL (ref 4.2–5.4)
SODIUM SERPL-SCNC: 136 MMOL/L (ref 135–145)
WBC # BLD AUTO: 3.1 K/UL (ref 4.8–10.8)

## 2020-01-23 PROCEDURE — A9270 NON-COVERED ITEM OR SERVICE: HCPCS | Performed by: INTERNAL MEDICINE

## 2020-01-23 PROCEDURE — 700111 HCHG RX REV CODE 636 W/ 250 OVERRIDE (IP): Performed by: INTERNAL MEDICINE

## 2020-01-23 PROCEDURE — 80048 BASIC METABOLIC PNL TOTAL CA: CPT

## 2020-01-23 PROCEDURE — 700102 HCHG RX REV CODE 250 W/ 637 OVERRIDE(OP): Performed by: INTERNAL MEDICINE

## 2020-01-23 PROCEDURE — G0378 HOSPITAL OBSERVATION PER HR: HCPCS

## 2020-01-23 PROCEDURE — 93306 TTE W/DOPPLER COMPLETE: CPT | Mod: 26 | Performed by: INTERNAL MEDICINE

## 2020-01-23 PROCEDURE — 96372 THER/PROPH/DIAG INJ SC/IM: CPT

## 2020-01-23 PROCEDURE — 93306 TTE W/DOPPLER COMPLETE: CPT

## 2020-01-23 PROCEDURE — 96376 TX/PRO/DX INJ SAME DRUG ADON: CPT

## 2020-01-23 PROCEDURE — 99214 OFFICE O/P EST MOD 30 MIN: CPT | Performed by: INTERNAL MEDICINE

## 2020-01-23 PROCEDURE — 85025 COMPLETE CBC W/AUTO DIFF WBC: CPT

## 2020-01-23 PROCEDURE — 96374 THER/PROPH/DIAG INJ IV PUSH: CPT

## 2020-01-23 PROCEDURE — 99225 PR SUBSEQUENT OBSERVATION CARE,LEVEL II: CPT | Performed by: INTERNAL MEDICINE

## 2020-01-23 RX ORDER — LEVOTHYROXINE SODIUM 0.03 MG/1
TABLET ORAL
Qty: 90 TAB | Refills: 2 | Status: SHIPPED | OUTPATIENT
Start: 2020-01-23 | End: 2020-02-11

## 2020-01-23 RX ADMIN — ROPINIROLE HYDROCHLORIDE 1 MG: 0.5 TABLET, FILM COATED ORAL at 21:12

## 2020-01-23 RX ADMIN — LISINOPRIL 40 MG: 20 TABLET ORAL at 17:54

## 2020-01-23 RX ADMIN — ASPIRIN 81 MG CHEWABLE TABLET 81 MG: 81 TABLET CHEWABLE at 00:25

## 2020-01-23 RX ADMIN — ENALAPRILAT 1.25 MG: 2.5 INJECTION INTRAVENOUS at 04:00

## 2020-01-23 RX ADMIN — NEPHROCAP 1 MG: 1 CAP ORAL at 00:25

## 2020-01-23 RX ADMIN — ATORVASTATIN CALCIUM 20 MG: 10 TABLET, FILM COATED ORAL at 21:12

## 2020-01-23 RX ADMIN — HEPARIN SODIUM 5000 UNITS: 5000 INJECTION, SOLUTION INTRAVENOUS; SUBCUTANEOUS at 05:36

## 2020-01-23 RX ADMIN — LISINOPRIL 40 MG: 20 TABLET ORAL at 05:39

## 2020-01-23 RX ADMIN — ATORVASTATIN CALCIUM 20 MG: 10 TABLET, FILM COATED ORAL at 00:25

## 2020-01-23 RX ADMIN — AMLODIPINE BESYLATE 10 MG: 5 TABLET ORAL at 05:35

## 2020-01-23 RX ADMIN — HEPARIN SODIUM 5000 UNITS: 5000 INJECTION, SOLUTION INTRAVENOUS; SUBCUTANEOUS at 15:01

## 2020-01-23 RX ADMIN — HEPARIN SODIUM 5000 UNITS: 5000 INJECTION, SOLUTION INTRAVENOUS; SUBCUTANEOUS at 21:12

## 2020-01-23 RX ADMIN — METOPROLOL TARTRATE 100 MG: 50 TABLET, FILM COATED ORAL at 05:34

## 2020-01-23 RX ADMIN — LEVOTHYROXINE SODIUM 50 MCG: 50 TABLET ORAL at 05:35

## 2020-01-23 RX ADMIN — METOPROLOL TARTRATE 100 MG: 50 TABLET, FILM COATED ORAL at 17:54

## 2020-01-23 RX ADMIN — SENNOSIDES AND DOCUSATE SODIUM 2 TABLET: 8.6; 5 TABLET ORAL at 05:34

## 2020-01-23 RX ADMIN — ENALAPRILAT 1.25 MG: 2.5 INJECTION INTRAVENOUS at 21:12

## 2020-01-23 ASSESSMENT — COGNITIVE AND FUNCTIONAL STATUS - GENERAL
DAILY ACTIVITIY SCORE: 24
SUGGESTED CMS G CODE MODIFIER MOBILITY: CH
SUGGESTED CMS G CODE MODIFIER DAILY ACTIVITY: CH
MOBILITY SCORE: 24

## 2020-01-23 ASSESSMENT — ENCOUNTER SYMPTOMS
FEVER: 0
HEARTBURN: 0
MYALGIAS: 0
COUGH: 0
DEPRESSION: 0
DIZZINESS: 0
VOMITING: 0
BLURRED VISION: 0
CHILLS: 0
NAUSEA: 0
SHORTNESS OF BREATH: 0

## 2020-01-23 NOTE — H&P
Hospital Medicine History & Physical Note    Date of Service  1/22/2020    Primary Care Physician  MALGORZATA Concepcion.    Consultants  Nephrology Dr. Najjar    Code Status  full    Chief Complaint  Shortness of breath    History of Presenting Illness  69 y.o. female who presented 1/22/2020 with shortness of breath  Intermittently over the last 8 days. She has increased shortness of breath with laying flat and with any exertion. It is better after dialysis and worse when she is close to needing dialysis. She did have dialysis today but was still short of breath afterward, although it was improved a little with the dialysis treatment. She complains of spots in her vision and increased blurry vision associated with her shortness of breath, when it is most severe. She denies any fever, or chills. She did have a  Nonproductive cough in the mornings the last 3 days. She did vomit once 3 days ago and has been constipated. She denies any rash, itching, headache, or dizziness. She does complain of left chest tightness with her shortness of breath but no chest palpitations. The pain in her chest lasts as long as the shortness of breath episodes, a few minutes at a time, and radiates into the left side of her back.    Review of Systems  Review of Systems   Constitutional: Negative for chills, diaphoresis, fever and malaise/fatigue.   HENT: Negative for sore throat.    Eyes: Positive for blurred vision.        Dark spots in vision worse when shortness of breath is bad   Respiratory: Positive for cough and shortness of breath. Negative for sputum production, wheezing and stridor.    Cardiovascular: Positive for chest pain and orthopnea. Negative for palpitations and leg swelling.        Left side chest tightness worse with walking or exertion  Legs not currently swollen, but have been swollen in the past   Gastrointestinal: Positive for constipation. Negative for abdominal pain, diarrhea and nausea.        Patient vomited  3 days ago but  None since then   Genitourinary: Negative for frequency, hematuria and urgency.   Musculoskeletal: Negative for back pain, joint pain and myalgias.   Skin: Negative for itching and rash.   Neurological: Negative for dizziness, tremors, seizures, weakness and headaches.   Psychiatric/Behavioral: Negative for depression. The patient is not nervous/anxious and does not have insomnia.    All other systems reviewed and are negative.      Past Medical History   has a past medical history of Abnormal cardiovascular stress test, Blood clotting disorder ([facility]), CAD (coronary artery disease), Dental disorder, Diabetes ([facility]), Dialysis patient ([facility]), High cholesterol, Hyperlipidemia, Hypertension, Kidney disease, and Kidney transplant candidate.    Surgical History   has a past surgical history that includes recovery (8/16/2016); other abdominal surgery; other (Left, 2014); zzz cardiac cath (8/16/16); and zzz cardiac cath (9/7/2016).     Family History  family history includes Diabetes in her brother and sister; Other in her sister.     Social History   reports that she has never smoked. She has never used smokeless tobacco. She reports that she does not drink alcohol or use drugs.    Allergies  No Known Allergies    Medications  Prior to Admission Medications   Prescriptions Last Dose Informant Patient Reported? Taking?   B Complex-C-Folic Acid (RENAL-SALVATORE) 0.8 MG Tab > 2 weeks at AM Rx Bottle (For Med Information) Yes Yes   Sig: Take 1 Tab by mouth See Admin Instructions. Pt takes on Mon, Wed, and Fri before dialysis   Dulaglutide (TRULICITY) 1.5 MG/0.5ML Solution Pen-injector 1/21/2020 at 1100 Patient Yes Yes   Sig: Inject 1.5 mg as instructed every 7 days. On Tue   ROPINIRole (REQUIP) 1 MG Tab 1/20/2020 at AM Rx Bottle (For Med Information) Yes Yes   Sig: Take 1 mg by mouth See Admin Instructions. take 1 tablet before dialysis and before bed as needed for restless legs  Dialysis is on Mon, Wed, and Fri    amLODIPine (NORVASC) 10 MG Tab 1/22/2020 at 1100 Rx Bottle (For Med Information) No No   Sig: Take 1 Tab by mouth every day.   aspirin (ASA) 81 MG Chew Tab chewable tablet 1/21/2020 at 1100 Rx Bottle (For Med Information) Yes No   Sig: Take 81 mg by mouth every day.   atorvastatin (LIPITOR) 20 MG Tab 1/21/2020 at 2000 Rx Bottle (For Med Information) Yes Yes   Sig: Take 20 mg by mouth every evening.   furosemide (LASIX) 40 MG Tab 1/21/2020 at 1100 Rx Bottle (For Med Information) No No   Sig: Take 1 Tab by mouth every day. Hold for SBP less than 100   levothyroxine (SYNTHROID) 50 MCG Tab 1/21/2020 at 1000 Rx Bottle (For Med Information) No No   Sig: TAKE 1 TAB BY MOUTH EVERY MORNING ON AN EMPTY STOMACH.   lisinopril (PRINIVIL) 20 MG Tab 1/22/2020 at 1100 Rx Bottle (For Med Information) No No   Sig: Take 2 Tabs by mouth 2 times a day.   metoprolol (LOPRESSOR) 100 MG Tab 1/22/2020 at 1100 Rx Bottle (For Med Information) No No   Sig: Take 1 Tab by mouth 2 times a day.      Facility-Administered Medications: None       Physical Exam  Temp:  [36.3 °C (97.4 °F)] 36.3 °C (97.4 °F)  Pulse:  [65-72] 65  Resp:  [17-23] 23  BP: (181-198)/() 196/68  SpO2:  [88 %-95 %] 92 %    Physical Exam  Vitals signs and nursing note reviewed.   Constitutional:       Appearance: She is not ill-appearing or diaphoretic.   HENT:      Nose: Nose normal. No congestion.      Mouth/Throat:      Mouth: Mucous membranes are dry.      Pharynx: No oropharyngeal exudate or posterior oropharyngeal erythema.   Neck:      Musculoskeletal: Neck supple. No neck rigidity or muscular tenderness.      Comments: No JVD  Cardiovascular:      Rate and Rhythm: Normal rate and regular rhythm.      Chest Wall: PMI is displaced.      Pulses: Normal pulses.      Heart sounds: Heart sounds are distant. No murmur.   Pulmonary:      Effort: No respiratory distress.      Breath sounds: No stridor. Rales present. No wheezing or rhonchi.      Comments: Accessory  muscle use with any exertion  Abdominal:      General: Abdomen is flat. Bowel sounds are normal. There is no distension.      Tenderness: There is no tenderness.   Musculoskeletal:         General: No swelling, tenderness or deformity.   Lymphadenopathy:      Cervical: No cervical adenopathy.   Skin:     General: Skin is warm.      Capillary Refill: Capillary refill takes less than 2 seconds.      Coloration: Skin is not jaundiced or pale.      Findings: No bruising or erythema.   Neurological:      General: No focal deficit present.      Mental Status: She is alert and oriented to person, place, and time.      Motor: No weakness.      Coordination: Coordination normal.   Psychiatric:         Mood and Affect: Mood normal.         Behavior: Behavior normal.         Thought Content: Thought content normal.         Laboratory:  Recent Labs     01/22/20  1356   WBC 3.1*   RBC 2.71*   HEMOGLOBIN 8.1*   HEMATOCRIT 23.6*   MCV 87.1   MCH 29.9   MCHC 34.3   RDW 42.0   PLATELETCT 127*   MPV 10.2     Recent Labs     01/22/20  1356   SODIUM 134*   POTASSIUM 4.0   CHLORIDE 93*   CO2 28   GLUCOSE 99   BUN 12   CREATININE 3.76*   CALCIUM 9.1     Recent Labs     01/22/20  1356   ALTSGPT 12   ASTSGOT 22   ALKPHOSPHAT 79   TBILIRUBIN 0.6   GLUCOSE 99         No results for input(s): NTPROBNP in the last 72 hours.      Recent Labs     01/22/20  1356   TROPONINT 56*       Urinalysis:    No results found     Imaging:  DX-CHEST-PORTABLE (1 VIEW)   Final Result      1.  Cardiomegaly with pulmonary edema.      2.  Bibasilar atelectasis and small bilateral pleural effusions.      EC-ECHOCARDIOGRAM COMPLETE W/O CONT    (Results Pending)         Assessment/Plan:  I anticipate this patient is appropriate for observation status at this time.    QT prolongation  Assessment & Plan  Will avoid antiemetics due to prolonged QT interval on EKG as reviewed by myself    Subclinical hypothyroidism- (present on admission)  Assessment &  Plan  synthroid    Acute hypoxemic respiratory failure (HCC)- (present on admission)  Assessment & Plan  The patient has room air saturations of 88% and accessory muscle use with movement due to shortness of breath despite dialysis earlier today  Nephrology, Dr. Burnett's group was consulted by the ER physician  Will taper oxygen as tolerated, check an echocardiogram as last one was 2018 and order respiratory therapy    Acute on chronic diastolic CHF (congestive heart failure) ([facility])- (present on admission)  Assessment & Plan  Will check an echocardiogram and treat with dialysis for fluid removal    Type 2 diabetes mellitus with hyperglycemia, without long-term current use of insulin ([facility])- (present on admission)  Assessment & Plan  continue weekly jardiance    RLS (restless legs syndrome)- (present on admission)  Assessment & Plan  [place]ip    End stage renal failure on dialysis ([facility])- (present on admission)  Assessment & Plan  Dialysis to treat    Essential hypertension- (present on admission)  Assessment & Plan  Continue metoprolol, lisinopril and norvasc  The patient has poorly controlled blood pressure with systolic pressure of 190's will treat with as needed medication as well as her oral medications        VTE prophylaxis: heparin

## 2020-01-23 NOTE — ASSESSMENT & PLAN NOTE
Echo with 60% EF grade 2 diastolic dysfunction;  pulm edema much improved after HD last night; another session planned per nephro;  Home tomorrow if stable

## 2020-01-23 NOTE — ASSESSMENT & PLAN NOTE
Continue metoprolol, lisinopril and norvasc  The patient has poorly controlled blood pressure with systolic pressure of 190's will treat with as needed medication as well as her oral medications

## 2020-01-23 NOTE — PROGRESS NOTES
Patient up to 311-1, SBA to hospital bed. 2L of oxygen applied along with telemetry monitor. Vital signs taken, BP elevated. Patient due for extra round of dialysis this evening, awaiting a time frame for this to occur. Patient primarily speaks Citizen of [place], certified  TRAVIS Murray to help assist in admit profile. Family at bedside, call light within reach.

## 2020-01-23 NOTE — CARE PLAN
Problem: Communication  Goal: The ability to communicate needs accurately and effectively will improve  Outcome: PROGRESSING AS EXPECTED  Intervention: [place] patient and significant other/support system to call light to alert staff of needs  Flowsheets (Taken 1/23/2020 0038)  Oriented to:: All of the Following : Location of Bathroom, Visiting Policy, Unit Routine, Call Light and Bedside Controls, Bedside Rail Policy, Smoking Policy, Rights and Responsibilities, Bedside Report, and Patient Education Notebook  Intervention: Develop alternate methods of communication with patient and significant other/support system  Note:   Khmer speaking staff able to help with basic communication.   service used for plan of care discussions.      Problem: Safety  Goal: Will remain free from falls  Outcome: PROGRESSING AS EXPECTED  Intervention: Implement fall precautions  Flowsheets (Taken 1/22/2020 2000)  Environmental Precautions: Treaded Slipper Socks on Patient;Personal Belongings, Wastebasket, Call Bell etc. in Easy Reach;Transferred to Stronger Side;Report Given to Other Health Care Providers Regarding Fall Risk;Bed in Low Position;Communication Sign for Patients & Families;Mobility Assessed & Appropriate Sign Placed

## 2020-01-23 NOTE — CARE PLAN
Problem: Infection  Goal: Will remain free from infection  Outcome: PROGRESSING AS EXPECTED  Intervention: Implement standard precautions and perform hand washing before and after patient contact  Note:   Standard precautions and hand hygiene practices implemented before and after patient room entry. Gloves worn during times of patient contact.       Problem: Respiratory:  Goal: Respiratory status will improve  Outcome: PROGRESSING AS EXPECTED  Intervention: Administer and titrate oxygen therapy  Note:   Oxygen titrated to room air, saturations are WNL.

## 2020-01-23 NOTE — PROGRESS NOTES
Telemetry Shift Summary    Rhythm SR/SB  HR Range 50s-70s  Ectopy rare PVCs  Measurements 0.18/0.08/0.44        Normal Values  Rhythm SR  HR Range    Measurements 0.12-0.20 / 0.06-0.10  / 0.30-0.52

## 2020-01-23 NOTE — PROGRESS NOTES
Report received from TRAVIS Elaine. Plan of care discussed. Patient resting comfortably in bed, declines any further needs at this time. Safety precautions in place.

## 2020-01-23 NOTE — PROGRESS NOTES
Hospital Medicine Daily Progress Note    Date of Service  1/23/2020    Chief Complaint  69 y.o. female admitted 1/22/2020 with acute sob/hypoxia despite having dialysis earlier, found to have pulm edema    Hospital Course    s/p dialysis in hospital yesterday with resolution of sob, now breathing fine on RA; denies other c/o.      Interval Problem Update  As above    Consultants/Specialty  nephro    Code Status  full    Disposition  Home when stable    Review of Systems  Review of Systems   Constitutional: Negative for fever.   HENT: Negative for hearing loss.    Eyes: Negative for blurred vision.   Respiratory: Negative for cough.    Cardiovascular: Negative for chest pain.   Gastrointestinal: Negative for heartburn.   Genitourinary: Negative for dysuria.   Musculoskeletal: Negative for myalgias.   Skin: Negative for rash.   Neurological: Negative for dizziness.   Psychiatric/Behavioral: Negative for depression.        Physical Exam  Temp:  [36.2 °C (97.2 °F)-37.2 °C (98.9 °F)] 36.8 °C (98.3 °F)  Pulse:  [63-77] 77  Resp:  [17-32] 17  BP: (155-208)/() 155/59  SpO2:  [82 %-96 %] 90 %    Physical Exam  Constitutional:       Appearance: Normal appearance.   HENT:      Head: Normocephalic.      Nose: Nose normal.   Eyes:      Extraocular Movements: Extraocular movements intact.   Neck:      Musculoskeletal: Normal range of motion and neck supple.   Cardiovascular:      Rate and Rhythm: Normal rate and regular rhythm.   Pulmonary:      Effort: Pulmonary effort is normal.      Breath sounds: Normal breath sounds.   Abdominal:      General: Abdomen is flat. Bowel sounds are normal.      Palpations: Abdomen is soft.   Musculoskeletal:         General: No swelling.      Right lower leg: No edema.      Left lower leg: No edema.   Skin:     General: Skin is warm.   Neurological:      General: No focal deficit present.   Psychiatric:         Mood and Affect: Mood normal.         Fluids    Intake/Output Summary (Last 24  hours) at 1/23/2020 1449  Last data filed at 1/23/2020 0910  Gross per 24 hour   Intake 625 ml   Output 3500 ml   Net -2875 ml       Laboratory  Recent Labs     01/22/20  1356 01/23/20  0442   WBC 3.1* 3.1*   RBC 2.71* 2.72*   HEMOGLOBIN 8.1* 8.2*   HEMATOCRIT 23.6* 23.8*   MCV 87.1 87.5   MCH 29.9 30.1   MCHC 34.3 34.5   RDW 42.0 42.1   PLATELETCT 127* 132*   MPV 10.2 11.2     Recent Labs     01/22/20  1356 01/23/20  0442   SODIUM 134* 136   POTASSIUM 4.0 4.1   CHLORIDE 93* 96   CO2 28 31   GLUCOSE 99 98   BUN 12 10   CREATININE 3.76* 3.00*   CALCIUM 9.1 9.9                   Imaging  EC-ECHOCARDIOGRAM COMPLETE W/O CONT   Final Result      DX-CHEST-PORTABLE (1 VIEW)   Final Result      1.  Cardiomegaly with pulmonary edema.      2.  Bibasilar atelectasis and small bilateral pleural effusions.           Assessment/Plan  * Acute hypoxemic respiratory failure (HCC)- (present on admission)  Assessment & Plan  Due to pulm edema from ESRD and/or CHF  Much better after HD here; now on RA and breathing at baseline      Acute on chronic diastolic CHF (congestive heart failure) (HCC)- (present on admission)  Assessment & Plan  Echo with 60% EF grade 2 diastolic dysfunction;  pulm edema much improved after HD last night; another session planned per nephro;  Home tomorrow if stable    End stage renal failure on dialysis (HCC)- (present on admission)  Assessment & Plan  Dialysis to treat; nephro consulting    QT prolongation- (present on admission)  Assessment & Plan  Will avoid antiemetics due to prolonged QT interval on EKG as reviewed by myself    Subclinical hypothyroidism- (present on admission)  Assessment & Plan  synthroid    Type 2 diabetes mellitus with hyperglycemia, without long-term current use of insulin ([facility])- (present on admission)  Assessment & Plan  continue weekly jardiance    RLS (restless legs syndrome)- (present on admission)  Assessment & Plan  Requip; stable    Essential hypertension- (present on  admission)  Assessment & Plan  Continue metoprolol, lisinopril and norvasc  The patient has poorly controlled blood pressure with systolic pressure of 190's will treat with as needed medication as well as her oral medications         VTE prophylaxis: heparin

## 2020-01-23 NOTE — ASSESSMENT & PLAN NOTE
Due to pulm edema from ESRD and/or CHF  Much better after HD here; now on RA and breathing at baseline

## 2020-01-23 NOTE — RESPIRATORY CARE
COPD EDUCATION by COPD CLINICAL EDUCATOR  1/23/2020 at 7:33 AM by Margot Boyd RRT     Patient reviewed by COPD education team. Patient does not have a history or diagnosis of COPD and is a non-smoker, therefore does not qualify for the COPD program.

## 2020-01-23 NOTE — PROGRESS NOTES
2 RN skin check complete.   Devices in place: tele monitor, nasal cannula.  Skin assessed under devices intact.  Confirmed pressure ulcers found on NA.  New potential pressure ulcers noted on NA. Wound consult placed NA.  The following interventions in place: pt turns self, ambulates, and has pillows for support/positioning.    Skin WDL.

## 2020-01-23 NOTE — PROGRESS NOTES
Hd treatment ordered by Dr Najjar started at 2021 and ended at 2324 with net UF of 3000 ml as ordered. Hypertensive entire treatment and pt complained of cramping at the end of treatment that went away after rinse back. Spoke with son to tell the clinic to challenge the dry weight since pt was still overload when admitted even after hd treatment at the clinic. Held sites for 20 minutes, no bleeding noted. See flow sheet for details.

## 2020-01-23 NOTE — CONSULTS
DATE OF SERVICE:  01/22/2020    REQUESTING PHYSICIAN:  Dr. Zayas from the emergency room service.    REASON FOR CONSULTATION:  Management of respiratory failure in the setting of   end-stage renal disease, assessing the need for urgent dialysis.    The patient seen and examined, medical record reviewed.    HISTORY OF PRESENT ILLNESS:  The patient is an unfortunate 69-year-old lady   with a past medical history significant for end-stage renal disease, coronary   artery disease, longstanding diabetes, presented to the hospital today with   worsening shortness of breath for the last week.  Patient complains of dyspnea   on exertion and orthopnea.  She did have dialysis earlier today, but I am not   sure how much fluid they removed.  The patient in the hospital is hypoxic,   also hypertensive.  She does have mild chest pain, but no radiation.  We were   called to manage her respiratory failure, assessing the need for urgent   dialysis.    PAST MEDICAL HISTORY:  1.  Diabetes mellitus.  2.  Dyslipidemia.  3.  End-stage renal disease.  4.  Coronary artery disease.    ALLERGIES:  No known drug allergies.    SOCIAL HISTORY:  Patient has no smoking history.    FAMILY HISTORY:  Positive for diabetes.    MEDICATIONS:  Reviewed.    REVIEW OF SYSTEMS:  All systems were reviewed.  They were negative except   outlined in the history of present illness.    PHYSICAL EXAMINATION:  GENERAL:  Patient is in mild respiratory distress.  VITAL SIGNS:  Showed blood pressure of 196/68, heart rate was 65, respiratory   rate was 23.  HEENT:  Normocephalic, atraumatic.  Sclerae is anicteric.  Pupils are   reactive.  Nose is normal.  Mucous membranes moist.  NECK:  No lymphadenopathy, no JVD, no thyroid mass.  CHEST:  Normal.  LUNGS:  Few rales at the bases.  HEART:  S1, S2.  ABDOMEN:  Soft, nontender, no hepatosplenomegaly.  There is no inguinal   lymphadenopathy.  EXTREMITIES:  There is +1 edema.  SKIN:  No skin rashes.  NEUROLOGIC:  Patient  is nervous.    LABORATORY DATA:  Her recent labs from today were reviewed.  I also reviewed   the chest x-ray image myself, which showed pulmonary edema.    ASSESSMENT AND PLAN:  1.  Respiratory failure.  2.  End-stage renal disease.  3.  Uncontrolled hypertension.  4.  Diabetes.  5.  Anemia.    PLAN:  1.  We will plan on urgent dialysis to manage her respiratory failure and   possible fluid overload.  2.  We will plan another dialysis tomorrow to optimize her volume status.  3.  Evaluate daily for dialysis.  4.  Rule out acute coronary syndrome.  5.  Epogen with dialysis.  6.  Rule out blood loss.  7.  Renal diet.  8.  Daily labs.  9.  Prognosis is guarded.    Plan discussed in detail with Dr. Zayas who I would like to thank for   consulting this very interesting case.       ____________________________________     FADI NAJJAR, MD FN / REMY    DD:  01/22/2020 17:17:09  DT:  01/22/2020 18:36:53    D#:  [id number]  Job#:  [id number]

## 2020-01-24 VITALS
DIASTOLIC BLOOD PRESSURE: 67 MMHG | HEIGHT: 60 IN | SYSTOLIC BLOOD PRESSURE: 153 MMHG | BODY MASS INDEX: 26.49 KG/M2 | TEMPERATURE: 97.8 F | HEART RATE: 65 BPM | WEIGHT: 134.92 LBS | RESPIRATION RATE: 18 BRPM | OXYGEN SATURATION: 94 %

## 2020-01-24 PROCEDURE — 700102 HCHG RX REV CODE 250 W/ 637 OVERRIDE(OP): Performed by: INTERNAL MEDICINE

## 2020-01-24 PROCEDURE — 90935 HEMODIALYSIS ONE EVALUATION: CPT | Performed by: INTERNAL MEDICINE

## 2020-01-24 PROCEDURE — 96372 THER/PROPH/DIAG INJ SC/IM: CPT

## 2020-01-24 PROCEDURE — 700111 HCHG RX REV CODE 636 W/ 250 OVERRIDE (IP): Performed by: INTERNAL MEDICINE

## 2020-01-24 PROCEDURE — 770020 HCHG ROOM/CARE - TELE (206)

## 2020-01-24 PROCEDURE — 90935 HEMODIALYSIS ONE EVALUATION: CPT

## 2020-01-24 PROCEDURE — A9270 NON-COVERED ITEM OR SERVICE: HCPCS | Performed by: INTERNAL MEDICINE

## 2020-01-24 PROCEDURE — 99239 HOSP IP/OBS DSCHRG MGMT >30: CPT | Performed by: INTERNAL MEDICINE

## 2020-01-24 PROCEDURE — 96376 TX/PRO/DX INJ SAME DRUG ADON: CPT

## 2020-01-24 RX ADMIN — METOPROLOL TARTRATE 100 MG: 50 TABLET, FILM COATED ORAL at 05:30

## 2020-01-24 RX ADMIN — LISINOPRIL 40 MG: 20 TABLET ORAL at 05:30

## 2020-01-24 RX ADMIN — AMLODIPINE BESYLATE 10 MG: 5 TABLET ORAL at 05:30

## 2020-01-24 RX ADMIN — EPOETIN ALFA 2700 UNITS: 3000 SOLUTION INTRAVENOUS; SUBCUTANEOUS at 12:10

## 2020-01-24 RX ADMIN — ENALAPRILAT 1.25 MG: 2.5 INJECTION INTRAVENOUS at 03:48

## 2020-01-24 RX ADMIN — NEPHROCAP 1 CAPSULE: 1 CAP ORAL at 05:30

## 2020-01-24 RX ADMIN — HEPARIN SODIUM 1500 UNITS: 1000 INJECTION, SOLUTION INTRAVENOUS; SUBCUTANEOUS at 11:05

## 2020-01-24 RX ADMIN — HEPARIN SODIUM 5000 UNITS: 5000 INJECTION, SOLUTION INTRAVENOUS; SUBCUTANEOUS at 05:29

## 2020-01-24 RX ADMIN — LEVOTHYROXINE SODIUM 50 MCG: 50 TABLET ORAL at 05:30

## 2020-01-24 RX ADMIN — ASPIRIN 81 MG CHEWABLE TABLET 81 MG: 81 TABLET CHEWABLE at 05:30

## 2020-01-24 NOTE — PROGRESS NOTES
Telemetry Shift Summary    Rhythm SR  HR Range 60-70's  Ectopy Rare PAC, PVC  Measurements 0.20/0.10/0.42        Normal Values  Rhythm SR  HR Range    Measurements 0.12-0.20 / 0.06-0.10  / 0.30-0.52

## 2020-01-24 NOTE — PROGRESS NOTES
Nephrology Daily Progress Note    Date of Service  1/23/2020    Chief Complaint  69 y.o. female admitted 1/22/2020 with ESRD, resp failure, had urgent HD    Interval Problem Update  Pt feels better today, no CP, no SOB    Review of Systems  Review of Systems   Constitutional: Negative for chills, fever and malaise/fatigue.   Respiratory: Negative for cough and shortness of breath.    Cardiovascular: Negative for chest pain and leg swelling.   Gastrointestinal: Negative for nausea and vomiting.   Genitourinary: Negative for dysuria, frequency and urgency.        Physical Exam  Temp:  [36.2 °C (97.2 °F)-37.2 °C (98.9 °F)] 36.6 °C (97.9 °F)  Pulse:  [63-77] 71  Resp:  [17-32] 18  BP: (155-208)/() 171/56  SpO2:  [82 %-96 %] 95 %    Physical Exam  Vitals signs and nursing note reviewed.   Constitutional:       General: She is not in acute distress.     Appearance: Normal appearance. She is well-developed. She is not diaphoretic.   HENT:      Head: Normocephalic and atraumatic.      Right Ear: External ear normal.      Left Ear: External ear normal.      Nose: Nose normal.   Eyes:      General: No scleral icterus.        Right eye: No discharge.         Left eye: No discharge.      Conjunctiva/sclera: Conjunctivae normal.   Cardiovascular:      Rate and Rhythm: Normal rate and regular rhythm.      Heart sounds: No murmur.   Pulmonary:      Effort: Pulmonary effort is normal. No respiratory distress.      Breath sounds: Normal breath sounds.   Musculoskeletal:         General: No swelling or tenderness.   Skin:     General: Skin is warm and dry.      Findings: No erythema.   Neurological:      General: No focal deficit present.      Mental Status: She is alert and oriented to person, place, and time.      Cranial Nerves: No cranial nerve deficit.   Psychiatric:         Mood and Affect: Mood normal.         Behavior: Behavior normal.         Fluids    Intake/Output Summary (Last 24 hours) at 1/23/2020 1709  Last data  filed at 1/23/2020 0910  Gross per 24 hour   Intake 625 ml   Output 3500 ml   Net -2875 ml       Laboratory  Recent Labs     01/22/20  1356 01/23/20  0442   WBC 3.1* 3.1*   RBC 2.71* 2.72*   HEMOGLOBIN 8.1* 8.2*   HEMATOCRIT 23.6* 23.8*   MCV 87.1 87.5   MCH 29.9 30.1   MCHC 34.3 34.5   RDW 42.0 42.1   PLATELETCT 127* 132*   MPV 10.2 11.2     Recent Labs     01/22/20  1356 01/23/20  0442   SODIUM 134* 136   POTASSIUM 4.0 4.1   CHLORIDE 93* 96   CO2 28 31   GLUCOSE 99 98   BUN 12 10   CREATININE 3.76* 3.00*   CALCIUM 9.1 9.9         No results for input(s): NTPROBNP in the last 72 hours.        Imaging  EC-ECHOCARDIOGRAM COMPLETE W/O CONT   Final Result      DX-CHEST-PORTABLE (1 VIEW)   Final Result      1.  Cardiomegaly with pulmonary edema.      2.  Bibasilar atelectasis and small bilateral pleural effusions.            Assessment/Plan  No new Assessment & Plan notes have been filed under this hospital service since the last note was generated.  Service: Nephrology     1 ESRD  2 resp failure :better  3 uncontrolled HTN  4 Anemia  Plan  no acute need for HD today  Consider minoxidil 5 mg daily  Epo with HD  Renal diet  Daily labs  Renal dose all meds  Avoid nephrotoxins  Prognosis guarded.

## 2020-01-24 NOTE — DOCUMENTATION QUERY
[facility]                                                                       Query Response Note      PATIENT:               DIOR RAMIRES  ACCT #:                  [id number]  MRN:                     [id number]  :                      1950  ADMIT DATE:       2020 1:04 PM  DISCH DATE:          RESPONDING  PROVIDER #:        [id number]           QUERY TEXT:    Anemia is documented in the Medical Record. Please specify the underlying cause of the anemia.    NOTE:  If the appropriate response is not listed below, please respond with a new note.              The patient's Clinical Indicators include:  Anemia is documented in your Consult and Progress Notes.  Pt has ESRD and is on HD 3 x /wk  hgb 8.1  hct 23.6  Treatment:  noted to r/o blood loss  monitor  HD  Risk:  Pt age on chronic dialysis for ESRD admitted w/acute pulmonary edema.  Options provided:   -- Blood loss anemia, acute   -- Blood loss anemia, chronic   -- Chronic anemia   -- Due to/in/with antineoplastic chemotherapy   -- Due to/in/with chronic kidney disease   -- Due to/in/with kidney failure   -- Due to/in/with neoplastic disease   -- Iron deficiency anemia   -- Macrocytic anemia   -- Microcytic anemia   -- Normocytic anemia   -- Postoperative blood loss anemia   -- Pernicious anemia   -- Unable to determine      Query created by: Benita Kwok on 2020 10:42 AM    RESPONSE TEXT:    Due to/in/with chronic kidney disease          Electronically signed by:  FADI NAJJAR MD 2020 3:22 PM

## 2020-01-24 NOTE — DISCHARGE INSTRUCTIONS
Discharge Instructions    Discharged to home by car with relative. Discharged via wheelchair, hospital escort: Yes.  Special equipment needed: Not Applicable    Be sure to schedule a follow-up appointment with your primary care doctor or any specialists as instructed.     Discharge Plan:   Diet Plan: Discussed  Activity Level: Discussed  Confirmed Follow up Appointment: Patient to Call and Schedule Appointment  Confirmed Symptoms Management: Discussed  Medication Reconciliation Updated: Yes  Influenza Vaccine Indication: Not indicated: Previously immunized this influenza season and > 8 years of age    I understand that a diet low in cholesterol, fat, and sodium is recommended for good health. Unless I have been given specific instructions below for another diet, I accept this instruction as my diet prescription.   Other diet: Renal    Special Instructions:     HF Patient Discharge Instructions  · Monitor your weight daily, and maintain a weight chart, to track your weight changes.   · Activity as tolerated, unless your Doctor has ordered otherwise. Other activity order: As tolerated.  · Follow a low fat, low cholesterol, low salt diet unless instructed otherwise by your Doctor. Read the labels on the back of food products and track your intake of fat, cholesterol and salt.   · Fluid Restriction No. If a Fluid Restriction has been ordered by your Doctor, measure fluids with a measuring cup to ensure that you are not exceeding the restriction.   · No smoking.  · Oxygen No. If your Doctor has ordered that you wear Oxygen at home, it is important to wear it as ordered.  · Did you receive an explanation from staff on the importance of taking each of your medications and why it is necessary to stay on the medications the physician/care provider has ordered? Yes  · Do you have any questions concerning how to manage your heart failure and what to do should you have any increased signs and symptoms after you go home?  No  · Do you feel like your heart failure care team involved you in the care treatment plan and allowed you to make decisions regarding your care while in the hospital and addressed any discharge needs you might have? Yes    See the educational handout provided at discharge for more information on monitoring your daily weight, activity and diet. This also explains more about Heart Failure, symptoms of a flare-up and some of the tests that you have undergone.     Warning Signs of a Flare-Up include:  · Swelling in the ankles or lower legs.  · Shortness of breath, while at rest, or while doing normal activities.   · Shortness of breath at night when in bed, or coughing in bed.   · Requiring more pillows to sleep at night, or needing to sit up at night to sleep.  · Feeling weak, dizzy or fatigued.     When to call your Doctor:  · Call [facility] about questions regarding the discharge instructions you were given [phone].  (Discharge Unit Med/Tele)  · Call your Primary Care Physician or Cardiologist if:   1. You experience any pain radiating to your jaw or neck.  2. You have any difficulty breathing.  3. You experience weight gain of 2 lbs in a day or 5 lbs in a week.   4. You feel any palpitations or irregular heartbeats.  5. You become dizzy or lose consciousness.   If you have had an angiogram or had a pacemaker or AICD placed, and experience:  1. Bleeding, drainage or swelling at the surgical / puncture site.  2. Fever greater than 100.0 F  3. Shock from internal defibrillator.  4. Cool and / or numb extremities.      · Is patient discharged on Warfarin / Coumadin?   No     Depression / Suicide Risk    As you are discharged from this [facility], it is important to learn how to keep safe from harming yourself.    Recognize the warning signs:  · Abrupt changes in personality, positive or negative- including increase in energy   · Giving away possessions  · Change in eating  patterns- significant weight changes-  positive or negative  · Change in sleeping patterns- unable to sleep or sleeping all the time   · Unwillingness or inability to communicate  · Depression  · Unusual sadness, discouragement and loneliness  · Talk of wanting to die  · Neglect of personal appearance   · Rebelliousness- reckless behavior  · Withdrawal from people/activities they love  · Confusion- inability to concentrate     If you or a loved one observes any of these behaviors or has concerns about self-harm, here's what you can do:  · Talk about it- your feelings and reasons for harming yourself  · Remove any means that you might use to hurt yourself (examples: pills, rope, extension cords, firearm)  · Get professional help from the community (Mental Health, Substance Abuse, psychological counseling)  · Do not be alone:Call your Safe Contact- someone whom you trust who will be there for you.  · Call your local CRISIS HOTLINE 133-8796 or 747-362-0620  · Call your local Children's Mobile Crisis Response Team [place] (168) 885-4235 or wwwMedShape  · Call the toll free National Suicide Prevention Hotlines   · National Suicide Prevention Lifeline 598-957-ARIT (7215)  · National Hope Line Network 800-SUICIDE (245-0289)      Heart Failure  Heart failure means your heart has trouble pumping blood. This makes it hard for your body to work well. Heart failure is usually a long-term (chronic) condition. You must take good care of yourself and follow your doctor's treatment plan.  HOME CARE  · Take your heart medicine as told by your doctor.  ¨ Do not stop taking medicine unless your doctor tells you to.  ¨ Do not skip any dose of medicine.  ¨ Refill your medicines before they run out.  ¨ Take other medicines only as told by your doctor or pharmacist.  · Stay active if told by your doctor. The elderly and people with severe heart failure should talk with a doctor about physical activity.  · Eat heart-healthy  foods. Choose foods that are without trans fat and are low in saturated fat, cholesterol, and salt (sodium). This includes fresh or frozen fruits and vegetables, fish, lean meats, fat-free or low-fat dairy foods, whole grains, and high-fiber foods. Lentils and dried peas and beans (legumes) are also good choices.  · Limit salt if told by your doctor.  · Cook in a healthy way. Roast, grill, broil, bake, poach, steam, or stir-pitt foods.  · Limit fluids as told by your doctor.  · Weigh yourself every morning. Do this after you pee (urinate) and before you eat breakfast. Write down your weight to give to your doctor.  · Take your blood pressure and write it down if your doctor tells you to.  · Ask your doctor how to check your pulse. Check your pulse as told.  · Lose weight if told by your doctor.  · Stop smoking or chewing tobacco. Do not use gum or patches that help you quit without your doctor's approval.  · Schedule and go to doctor visits as told.  · Nonpregnant women should have no more than 1 drink a day. Men should have no more than 2 drinks a day. Talk to your doctor about drinking alcohol.  · Stop illegal drug use.  · Stay current with shots (immunizations).  · Manage your health conditions as told by your doctor.  · Learn to manage your stress.  · Rest when you are tired.  · If it is really hot outside:  ¨ Avoid intense activities.  ¨ Use air conditioning or fans, or get in a cooler place.  ¨ Avoid caffeine and alcohol.  ¨ Wear loose-fitting, lightweight, and light-colored clothing.  · If it is really cold outside:  ¨ Avoid intense activities.  ¨ Layer your clothing.  ¨ Wear mittens or gloves, a hat, and a scarf when going outside.  ¨ Avoid alcohol.  · Learn about heart failure and get support as needed.  · Get help to maintain or improve your quality of life and your ability to care for yourself as needed.  GET HELP IF:   · You gain weight quickly.  · You are more short of breath than usual.  · You cannot do  your normal activities.  · You tire easily.  · You cough more than normal, especially with activity.  · You have any or more puffiness (swelling) in areas such as your hands, feet, ankles, or belly (abdomen).  · You cannot sleep because it is hard to breathe.  · You feel like your heart is beating fast (palpitations).  · You get dizzy or light-headed when you stand up.  GET HELP RIGHT AWAY IF:   · You have trouble breathing.  · There is a change in mental status, such as becoming less alert or not being able to focus.  · You have chest pain or discomfort.  · You faint.  MAKE SURE YOU:   · Understand these instructions.  · Will watch your condition.  · Will get help right away if you are not doing well or get worse.  This information is not intended to replace advice given to you by your health care provider. Make sure you discuss any questions you have with your health care provider.  Document Released: 09/26/2009 Document Revised: 01/08/2016 Document Reviewed: 02/03/2014  [employer] Interactive Patient Education © 2017 [employer]  \  Insuficiencia cardíaca  (Heart Failure)  La insuficiencia cardíaca es bernardo afección en la que el corazón tiene dificultad para bombear la andrae. El corazón no bombea andrae de manera eficiente para que el organismo pueda funcionar shantell. En algunos casos de insuficiencia cardíaca, los líquidos vuelven a los pulmones, o puede kajal hinchazón (edema) en la yo inferior de las piernas. La insuficiencia cardíaca por lo general es bernardo enfermedad de larga duración (crónica). Es importante que se cuide mucho y que siga el plan de tratamiento que le indique donahue médico.  CAUSAS   Algunas enfermedades y condiciones pueden causar insuficiencia cardíaca. Estas incluyen lo siguiente:  · Presión arterial elevada (hipertensión). La hipertensión hace que el músculo cardíaco trabaje más de lo normal. Cuando la presión en los vasos sanguíneos es meliza, el corazón tiene que bombear (contraerse) con más  fuerza para hacer circular la andrae por todo el cuerpo. La hipertensión arterial hace que con el tiempo el corazón se vuelva rígido y débil.  · Enfermedad arterial coronaria (EAC). La enfermedad arterial coronaria es la acumulación de colesterol y grasa (placa) en las arterias del corazón. La obstrucción de las arterias priva al músculo cardíaco de andrae y oxígeno. [place] ocasiona dolor en el pecho y puede conducir a un infarto. La hipertensión arterial también favorece la enfermedad arterial coronaria.  · Ataque cardíaco (infarto de miocardio). El ataque cardíaco se produce cuando se obstruye bernardo o más arterias del corazón. La pérdida de oxígeno daña el tejido muscular cardíaco. Cuando esto ocurre, bernardo parte del músculo cardíaco muere. El tejido dañado no se contrae shantell y debilita la capacidad del corazón para bombear andrae.  · Válvulas cardíacas anormales. Cuando las válvulas cardíacas no se abren y cierran noemi corresponde, pueden causar insuficiencia cardíaca. [place] hace que el músculo cardíaco tenga que bombear con más intensidad para que la andrae fluya.  · Enfermedad del músculo cardíaco (miocardiopatía o miocarditis). En esta enfermedad, el músculo cardíaco está dañado por diversas causas. Entre ellas se encuentran el consumo de alcohol o drogas, las infecciones o pueden ser causas desconocidas. Todas estas causas aumentan el riesgo de insuficiencia cardíaca.  · Enfermedad pulmonar. Enfermedad pulmonar que hace que el corazón se esfuerce más porque los pulmones no funcionan correctamente. [place] puede hacer que el corazón se tensione, y causar insuficiencia.  · Diabetes. La diabetes aumenta el riesgo de insuficiencia cardíaca. El nivel elevado de azúcar en la andrae contribuye a aumentar los niveles de grasas (lípidos) en la andrae. La diabetes también favorece que lentamente se dañen los pequeños vasos sanguíneos que transportan nutrientes importantes al el músculo cardíaco. Cuando el corazón no obtiene  oxígeno y alimento suficiente, se debilita y se torna rígido. Por lo tanto no se contrae de manera eficiente.  · Hay otras enfermedades y condiciones que pueden contribuir a la insuficiencia cardíaca. Entre ellas se incluyen el ritmo cardíaco anormal, los problemas de tiroides y los recuentos bajos de glóbulos rojos (anemia).  Determinadas conductas perjudiciales aumentan el riesgo de insuficiencia cardíaca, por ejemplo:  · Tener sobrepeso.  · Fumar o mascar tabaco.  · Consumir alimentos ricos en grasas y colesterol.  · Abusar de las drogas ilícitas o del alcohol.  · La falta de actividad física.  SÍNTOMAS   Los síntomas pueden variar y ser difíciles de detectar. Los síntomas pueden incluir:  · Falta de aire al realizar actividades noemi subir escaleras.  · Tos persistente.  · Hinchazón de los pies, tobillos, piernas o abdomen.  · Aumento de peso sin motivo.  · Dificultad para respirar al estar acostado (ortopnea).  · Despertarse con la necesidad de sentarse y roscoe aire.  · Latidos cardíacos rápidos.  · Fatiga y pérdida de energía.  · Mareos, o sensación de desmayo o desvanecimiento.  · Pérdida del apetito.  · Náuseas.  · Orina con más frecuencia irma la noche (nocturia).  DIAGNÓSTICO   El diagnóstico se realiza según la historia clínica, los síntomas, el examen físico y las pruebas diagnósticas. Las pruebas diagnósticas son:  · Ecocardiograma.  · Electrocardiograma.  · Radiografía de tórax.  · Análisis de andrae.  · Prueba de esfuerzo.  · Angiografía cardíaca.  · Gammagrafía.  TRATAMIENTO   El tratamiento está destinado al control de los síntomas. Podrá ser necesario que tome medicamentos, realice cambios en donahue estilo de augie o se someta a bernardo intervención quirúrgica para tratar la insuficiencia cardíaca.  · Los medicamentos para el tratamiento de la insuficiencia cardíaca son:  ¨ Inhibidores de la enzima convertidora de angiotensina (IECA). [place] tipo de medicamento bloquea los efectos de bernardo proteína llamada  enzima convertidora de angiotensina. Los inhibidores de la ECA relajan (dilatan) los vasos sanguíneos y ayudan a disminuir la presión arterial.  ¨ Bloqueadores de los receptores de angiotensina (BRA). [place] tipo de medicamento bloquea las acciones de bernardo proteína de la andrae llamada angiotensina. Los bloqueadores de los receptores de angiotensina dilatan los vasos sanguíneos y ayudan a reducir la presión arterial.  ¨ Medicamentos para eliminar los líquidos (diuréticos). Los diuréticos hacen que los riñones eliminen la sal y el agua de la andrae. El líquido en exceso es eliminado con la orina. Esta eliminación del exceso de líquido disminuye el volumen de andrae que el corazón bombea.  ¨ Betabloqueantes. Los betabloqueantes evitan que el corazón palpite demasiado rápido y mejoran la fuerza del músculo cardíaco.  ¨ Digitálicos. Aumentan la fuerza de los latidos cardíacos.  · Los cambios hacia un estilo de augie saludable incluyen:  ¨ Alcanzar y mantener un peso saludable.  ¨ Dejar de fumar o mascar tabaco.  ¨ Consumir alimentos cardiosaludables.  ¨ Limitar o evitar el alcohol.  ¨ Dejar de consumir drogas ilícitas.  ¨ Hacer actividad física según las indicaciones de donahue médico.  · Los tratamientos quirúrgicos para la insuficiencia cardíaca pueden ser:  ¨ Un procedimiento para abrir las arterias obstruidas, reparación de válvulas cardíacas dañadas o la extirpación del tejido muscular cardíaco dañado.  ¨ La colocación de un marcapasos para ayudar al funcionamiento del músculo cardíaco y para controlar ciertos ritmos cardíacos anormales.  ¨ Un desfibrilador cardioversor interno para tratar determinados ritmos cardíacos graves anormales.  ¨ Un dispositivo de asistencia ventricular izquierda (MICHAEL) para ayudar a que el corazón bombee andrae.  INSTRUCCIONES PARA EL CUIDADO EN EL HOGAR   · [place] los medicamentos solamente noemi se lo haya indicado el médico. Los medicamentos son importantes para reducir la carga de trabajo del  corazón, disminuir la progresión de la insuficiencia cardíaca y mejorar los síntomas.  ¨ No deje de roscoe los medicamentos, excepto si se lo indica donahue médico.  ¨ No se saltee ninguna dosis de los medicamentos.  ¨ Pida bernardo nueva receta antes de quedarse sin medicamentos. Es necesario que tome bhaskar medicamentos todos los días.  · Shilpa actividad física moderada si se lo indica donahue médico. La actividad física moderada puede beneficiar a algunas personas. Los ancianos y las personas con insuficiencia cardíaca grave deben consultarle a donahue médico qué actividades físicas son recomendables para ellos.  · Consuma alimentos cardiosaludables. Los alimentos no deben incluir grasas trans y deben tener bajo contenido de grasas saturadas, colesterol y sal (sodio). Las opciones saludables incluyen frutas frescas o congeladas y verduras, pescado, jossie magras, legumbres, productos lácteos descremados o bajos en grasa y cereales integrales o alimentos con alto contenido de fibra. Hable con un nutricionista para aprender más sobre los alimentos cardiosaludables.  · Limite el sodio si se lo indica donahue médico. La restricción de sodio puede reducir los síntomas de insuficiencia cardíaca en algunas personas. Hable con un nutricionista para aprender más sobre los condimentos cardiosaludables.  · Use métodos de cocción saludables. Los métodos de cocción saludables incluyen asar, grillar, hervir, hornear, cocer al vapor o saltear. Hable con un nutricionista para aprender más acerca de los métodos de cocción saludables.  · Limite los líquidos si se lo indica donahue médico. La restricción de líquidos puede reducir los síntomas de insuficiencia cardíaca en algunas personas.  · Controle donahue peso a diario. Es importante que se pese todos los días para reconocer anticipadamente cuando hay exceso de líquido. Hágalo todas las mañanas después de orinar y antes de desayunar. Use la misma ropa cada vez que se pese. [place]ote donahue peso todos los días. Lleve a donahue  médico el registro de donahue peso.  · Controle y registre donahue presión arterial si se lo indica donahue médico.  · Contrólese el pulso si se lo indica donahue médico.  · Pierda peso si se lo indica donahue médico. La pérdida de peso puede reducir los síntomas de insuficiencia cardíaca en algunas personas.  · Deje de fumar o mascar tabaco. La nicotina hace que el corazón trabaje más y estreche los vasos sanguíneos. No utilice chicles ni parches de nicotina antes de hablar con donahue médico.  · Concurra a todas las visitas de control noemi se lo haya indicado el médico. [place] es importante.  · Limite el consumo de alcohol a no más de 1 medida por día en las mujeres no embarazadas y 2 medidas en los hombres. Bernardo medida equivale a 12 onzas de cerveza, 5 onzas de vino o 1½ onzas de bebidas alcohólicas de meliza graduación. [place] más puede ser dañino para el corazón. Informe a donahue médico si martin alcohol varias veces a la semana. Hable con donahue médico acerca de si el alcohol es seguro para usted. Si el corazón ya ha sufrido un daño por el alcohol o sufre bernardo insuficiencia cardíaca grave, debe dejar de consumirlo completamente.  · Deje de consumir drogas ilícitas.  · Manténgase al día con las vacunas. [place] es especialmente importante prevenir las infecciones respiratorias con vacunas actualizadas contra el neumococo y la gripe.  · Controle otros problemas de lazaro, noemi hipertensión, diabetes, enfermedad de la tiroides o ritmos cardíacos anormales según las indicaciones de donahue médico.  · Aprenda a manejar el estrés.  · Planifique períodos de descanso cuando se sienta fatigado.  · Aprenda estrategias para manejar las altas temperaturas. Si el clima es extremadamente caluroso:  ¨ Evite la actividad física intensa.  ¨ Use el aire acondicionado o los ventiladores o busque un lugar más fresco.  ¨ Evite la cafeína y el alcohol.  ¨ Use ropa holgada, ligera y de colores riddhi.  · Aprenda estrategias para manejar el frío. Si el clima es extremadamente  frío:  ¨ Evite la actividad física intensa.  ¨ Vístase con varias capas de ropa.  ¨ Use mitones o guantes, un sombrero y bernardo bufanda cuando salga.  ¨ Evite el alcohol.  · Reciba asesoramiento y apoyo si lo necesita.  · Busque programas de rehabilitación y participe en ellos para mantener o mejorar donahue independencia y donahue calidad de augie.  SOLICITE ATENCIÓN MÉDICA SI:   · Aumenta de peso rápidamente.  · Nota que le falta el aire y esto no es habitual en usted.  · No puede participar en bhaskar actividades físicas habituales.  · Se cansa con facilidad.  · Tose más de lo normal, especialmente al realizar actividad física.  · Observa que bhaskar mis, pies, tobillos o abdomen se le hinchan o están más hinchados que lo habitual.  · Le valentina dormir debido a que le resulta difícil respirar.  · Siente que el corazón late rápido (palpitaciones).  · Siente mareos o sensación de desvanecimiento al ponerse de pie.  SOLICITE ATENCIÓN MÉDICA DE INMEDIATO SI:   · Tiene dificultad para respirar.  · Hay bernardo modificación en el estado mental, noemi disminución de la lucidez o dificultad para concentrarse.  · Siente dolor o molestias en el pecho.  · Se desmaya bernardo vez (síncope).  ASEGÚRESE DE QUE:   · Comprende estas instrucciones.  · Controlará donahue afección.  · Recibirá ayuda de inmediato si no mejora o si empeora.  Esta información no tiene noemi fin reemplazar el consejo del médico. Asegúrese de hacerle al médico cualquier pregunta que tenga.  Document Released: 12/18/2006 Document Revised: 04/10/2017  [employer] Interactive Patient Education © 2017 [employer]

## 2020-01-24 NOTE — PROGRESS NOTES
Pt had transient ST elevation per monitor tech, pt currently NSR w/o any noted ST elevation on the monitor. Pt SB to 40s, denies any pain, pt asymptomatic. VSS. MD aware and no new orders received.

## 2020-01-24 NOTE — PROGRESS NOTES
Pt rested in bed overnight. No needs verbalized. Pt medicated for SBP > 165 twice overnight. Morning medications given.

## 2020-01-24 NOTE — PROGRESS NOTES
Beside report received from Denys ROBLES. Safety precautions in place. Call light within reach. Pt resting in bed.

## 2020-01-24 NOTE — PROGRESS NOTES
Telemetry Shift Summary    Rhythm SR  HR Range 60-70  Ectopy R PVC  Measurements 0.16/0.08/0.44        Normal Values  Rhythm SR  HR Range    Measurements 0.12-0.20 / 0.06-0.10  / 0.30-0.52    Medications given with the use of the ipad .

## 2020-01-24 NOTE — PROGRESS NOTES
Pt aox4, discharge summary given, discharge education provided.  Pt verbalized understanding.  Follow up scheduled. IV removed. All belongings returned. Family at bedside, no further concerns. Escorted by staff to the [facility]. Discharged safely.

## 2020-01-24 NOTE — DISCHARGE SUMMARY
Discharge Summary    CHIEF COMPLAINT ON ADMISSION  Chief Complaint   Patient presents with   • Chest Pain     started 8 days ago   • Shortness of Breath     x 8 days       Reason for Admission  Chest Pressure;Shortness of Breath*     Admission Date  1/22/2020    CODE STATUS  Full Code    HPI & HOSPITAL COURSE  This is a 69 y.o. female here with shortness of breath  Intermittently over the last 8 days. She has increased shortness of breath with laying flat and with any exertion. It is better after dialysis and worse when she is close to needing dialysis. She did have dialysis today but was still short of breath afterward, although it was improved a little with the dialysis treatment. She complains of spots in her vision and increased blurry vision associated with her shortness of breath, when it is most severe. She denies any fever, or chills. She did have a  Nonproductive cough in the mornings the last 3 days. She did vomit once 3 days ago and has been constipated. She denies any rash, itching, headache, or dizziness. She does complain of left chest tightness with her shortness of breath but no chest palpitations. The pain in her chest lasts as long as the shortness of breath episodes, a few minutes at a time, and radiates into the left side of her back.   CXR showed pulm edema; echo showed:  Compared to the images of the study done 12/23/18, RVSP previously   severely elevated, could not be assessed on the study, otherwise no   significant change.  Left ventricular ejection fraction is visually estimated to be 60%.  Mild concentric left ventricular hypertrophy.  Grade II diastolic dysfunction.  Moderately dilated left atrium.  Mild mitral regurgitation.  Unable to estimate pulmonary artery pressure due to an inadequate   tricuspid regurgitant jet.  Trivial/physiologic pericardial fluid.    Troponins were mildly elevated but not rising, c/w demand ischemia.  Cardiac meds were continued and patient had no chest pain  here.    The pulmonary edema was felt to be related to her ESRD and/or CHF exacerbation.  Patient underwent urgent dialysis in hospital with resolution of sob, now breathing fine on RA; denies other c/o. Nephrology consulted on the case and patient had another dialysis session on day of discharge.  She is doing well at time of discharge.    Therefore, she is discharged in good and stable condition to home with close outpatient follow-up.    The patient met 2-midnight criteria for an inpatient stay at the time of discharge.    Discharge Date  1/24/20    FOLLOW UP ITEMS POST DISCHARGE  Pcp/nephrology/outpatient dialysis    DISCHARGE DIAGNOSES  Principal Problem:    Acute hypoxemic respiratory failure (HCC) POA: Yes  Active Problems:    End stage renal failure on dialysis (HCC) POA: Yes    Acute on chronic diastolic CHF (congestive heart failure) (HCC) POA: Yes    Essential hypertension POA: Yes    RLS (restless legs syndrome) POA: Yes    Type 2 diabetes mellitus with hyperglycemia, without long-term current use of insulin (HCC) POA: Yes    Subclinical hypothyroidism POA: Yes    QT prolongation POA: Yes  Resolved Problems:    * No resolved hospital problems. *      FOLLOW UP  Future Appointments   Date Time Provider Department Center   1/31/2020  1:00 PM ANGELITA Concepcion   2/19/2020  3:30 PM CHAPO Delarosa   2/25/2020  8:45 AM Shailesh Hernandez M.D. [facility] None   7/29/2020  4:00 PM ANGELITA Concepcion     outpatient dialysis as before    In 1 week        MEDICATIONS ON DISCHARGE     Medication List      CHANGE how you take these medications      Instructions   * levothyroxine 50 MCG Tabs  What changed:  Another medication with the same name was added. Make sure you understand how and when to take each.  Commonly known as:  SYNTHROID   TAKE 1 TAB BY MOUTH EVERY MORNING ON AN EMPTY STOMACH.  Dose:  50 mcg     * levothyroxine 25 MCG Tabs  What changed:   You were already taking a medication with the same name, and this prescription was added. Make sure you understand how and when to take each.  Commonly known as:  SYNTHROID   TAKE 1 TAB BY MOUTH EVERY MORNING ON AN EMPTY STOMACH.         * This list has 2 medication(s) that are the same as other medications prescribed for you. Read the directions carefully, and ask your doctor or other care provider to review them with you.            CONTINUE taking these medications      Instructions   amLODIPine 10 MG Tabs  Commonly known as:  NORVASC   Take 1 Tab by mouth every day.  Dose:  10 mg     aspirin 81 MG Chew chewable tablet  Commonly known as:  ASA   Take 81 mg by mouth every day.  Dose:  81 mg     atorvastatin 20 MG Tabs  Commonly known as:  LIPITOR   Take 20 mg by mouth every evening.  Dose:  20 mg     furosemide 40 MG Tabs  Commonly known as:  LASIX   Take 1 Tab by mouth every day. Hold for SBP less than 100  Dose:  40 mg     lisinopril 20 MG Tabs  Commonly known as:  PRINIVIL   Take 2 Tabs by mouth 2 times a day.  Dose:  40 mg     metoprolol 100 MG Tabs  Commonly known as:  LOPRESSOR   Take 1 Tab by mouth 2 times a day.  Dose:  100 mg     RENAL-SALVATORE 0.8 MG Tabs   Take 1 Tab by mouth See Admin Instructions. Pt takes on Mon, Wed, and Fri before dialysis  Dose:  1 Tab     ROPINIRole 1 MG Tabs  Commonly known as:  REQUIP   Take 1 mg by mouth See Admin Instructions. take 1 tablet before dialysis and before bed as needed for restless legs  Dialysis is on Mon, Wed, and Fri  Dose:  1 mg     TRULICITY 1.5 MG/0.5ML Sopn  Generic drug:  Dulaglutide   Inject 1.5 mg as instructed every 7 days. On Tue  Dose:  1.5 mg            Allergies  No Known Allergies    DIET  Orders Placed This Encounter   Procedures   • Diet Order Renal     Standing Status:   Standing     Number of Occurrences:   1     Order Specific Question:   Diet:     Answer:   Renal [8]       ACTIVITY  As tolerated.  Weight bearing as  tolerated    CONSULTATIONS  nephrology    PROCEDURES  dialysis    LABORATORY  Lab Results   Component Value Date    SODIUM 136 01/23/2020    POTASSIUM 4.1 01/23/2020    CHLORIDE 96 01/23/2020    CO2 31 01/23/2020    GLUCOSE 98 01/23/2020    BUN 10 01/23/2020    CREATININE 3.00 (H) 01/23/2020        Lab Results   Component Value Date    WBC 3.1 (L) 01/23/2020    HEMOGLOBIN 8.2 (L) 01/23/2020    HEMATOCRIT 23.8 (L) 01/23/2020    PLATELETCT 132 (L) 01/23/2020        Total time of the discharge process exceeds 32 minutes.

## 2020-01-24 NOTE — CARE PLAN
Problem: Communication  Goal: The ability to communicate needs accurately and effectively will improve  Outcome: PROGRESSING AS EXPECTED  Intervention: Educate patient and significant other/support system about the plan of care, procedures, treatments, medications and allow for questions  Note:   Discussed use of pain scale, call light, medications and nonpharmacologic ways to control pain. Whiteboard updated, POC discussed.  utilized when needed.     Problem: Infection  Goal: Will remain free from infection  Outcome: PROGRESSING AS EXPECTED  Intervention: Implement standard precautions and perform hand washing before and after patient contact  Note:   Pt taught signs and symptoms of infection. Instructed on proper hand washing techniques and oral care. Standard precautions used at every encounter.

## 2020-01-24 NOTE — PROGRESS NOTES
[facility] Progress Note      HD ordered by Dr. Najjar. Treatment started at 1110 and ended at 1410.     Total Net UF 2500mL.    Pt tolerated treatment well with no issues. See paper flow sheet for details. Cannulation needles taken out, held pressure for 10 min and placed gauze dressing with no bleeding. SUSHIL AVF + for bruit/thrill. Report given to NIKKIE Maurice RN.

## 2020-01-24 NOTE — PROGRESS NOTES
Assumed pt care. AOx4. Denies any pain at this time.  Denies any other distress.  Discussed POC.  Pt verbalized understanding.  Hourly rounding in place. Fall precautions in place and call lights w/in reach.

## 2020-01-24 NOTE — DISCHARGE PLANNING
Outpatient Dialysis Note    Confirmed patient is active at:    [facility]  [address]  [place] [address]     Schedule: Monday, Wednesday, Friday  Time: 06:00am     Spoke with Jacki at facility who confirmed.    Forwarded records for review.    Vicki Rodriguez- Dialysis Coordinator  [facility] [employer] # [phone]

## 2020-01-25 NOTE — PROGRESS NOTES
Monitor Summary     Rhythm:SR 61-71  Measurements: 0.20/0.08/0.44  ECTOPIES: N/a    Pt touch down 48, nonsustained, nonsymptomatic.  MD aware.        Normal Values  Rhythm SR  HR Range    Measurements 0.12-0.20 / 0.06-0.10  / 0.30-0.52

## 2020-02-07 ENCOUNTER — OFFICE VISIT (OUTPATIENT)
Dept: MEDICAL GROUP | Facility: MEDICAL CENTER | Age: 70
End: 2020-02-07
Payer: MEDICARE

## 2020-02-07 VITALS
TEMPERATURE: 97.9 F | WEIGHT: 124.2 LBS | HEIGHT: 60 IN | DIASTOLIC BLOOD PRESSURE: 60 MMHG | OXYGEN SATURATION: 96 % | BODY MASS INDEX: 24.39 KG/M2 | HEART RATE: 72 BPM | SYSTOLIC BLOOD PRESSURE: 122 MMHG

## 2020-02-07 DIAGNOSIS — G25.81 RLS (RESTLESS LEGS SYNDROME): ICD-10-CM

## 2020-02-07 DIAGNOSIS — R63.0 POOR APPETITE: ICD-10-CM

## 2020-02-07 DIAGNOSIS — Z09 HOSPITAL DISCHARGE FOLLOW-UP: ICD-10-CM

## 2020-02-07 PROCEDURE — 99214 OFFICE O/P EST MOD 30 MIN: CPT | Performed by: NURSE PRACTITIONER

## 2020-02-07 ASSESSMENT — PATIENT HEALTH QUESTIONNAIRE - PHQ9
CLINICAL INTERPRETATION OF PHQ2 SCORE: 1
SUM OF ALL RESPONSES TO PHQ QUESTIONS 1-9: 7
5. POOR APPETITE OR OVEREATING: 3 - NEARLY EVERY DAY

## 2020-02-08 NOTE — ASSESSMENT & PLAN NOTE
Food is making her feel nauseated, no vomiting. Started for about 1 month. Has lost about 10 pounds per our scales. Doesn't feel like eating anything. She is making herself eat due to her medications. Has not tried anything for this. No diarrhea, constipation, abdominal pain,     Sometimes having stress and feeling sad.

## 2020-02-08 NOTE — ASSESSMENT & PLAN NOTE
Admitted from 1/22/20-1/24/20 for pulmonary edema. She had been having increasing shortness of breath and left arm and chest pain. Xray showed pulmonary edema, troponin was elevated due to demand ischemia. She was given emergent dialysis inpatient, then an additionally dialyzed the day of discharge. Her shortness of breath and chest pain resolved. She is being followed closely by nephrology.     Since discharge patient is feeling well. Denies shortness of breath, chest/arm/jaw pain. Not weighing herself at home.

## 2020-02-10 NOTE — PROGRESS NOTES
Subjective:   Tere Gallegos is a 69 y.o. female here today for hospital follow up:    Hospital discharge follow-up  Admitted from 1/22/20-1/24/20 for pulmonary edema. She had been having increasing shortness of breath and left arm and chest pain. Xray showed pulmonary edema, troponin was elevated due to demand ischemia. She was given emergent dialysis inpatient, then an additionally dialyzed the day of discharge. Her shortness of breath and chest pain resolved. She is being followed closely by nephrology.     Since discharge patient is feeling well. Denies shortness of breath, chest/arm/jaw pain. Not weighing herself at home.     Poor appetite  Food is making her feel nauseated, no vomiting. Started for about 1 month. Has lost about 10 pounds per our scales. Doesn't feel like eating anything. She is making herself eat due to her medications. Has not tried anything for this. No diarrhea, constipation, abdominal pain,     Sometimes having stress and feeling sad.     RLS (restless legs syndrome)  Patient reports symptoms are well controlled on 1 mg Ropinerole nightly and prior to dialysis.          Current medicines (including changes today)  Current Outpatient Medications   Medication Sig Dispense Refill   • levothyroxine (SYNTHROID) 25 MCG Tab TAKE 1 TAB BY MOUTH EVERY MORNING ON AN EMPTY STOMACH. 90 Tab 2   • atorvastatin (LIPITOR) 20 MG Tab Take 20 mg by mouth every evening.     • B Complex-C-Folic Acid (RENAL-SALVATORE) 0.8 MG Tab Take 1 Tab by mouth See Admin Instructions. Pt takes on Mon, Wed, and Fri before dialysis     • Dulaglutide (TRULICITY) 1.5 MG/0.5ML Solution Pen-injector Inject 1.5 mg as instructed every 7 days. On Tue     • ROPINIRole (REQUIP) 1 MG Tab Take 1 mg by mouth See Admin Instructions. take 1 tablet before dialysis and before bed as needed for restless legs  Dialysis is on Mon, Wed, and Fri     • furosemide (LASIX) 40 MG Tab Take 1 Tab by mouth every day. Hold for SBP less than 100 90  Tab 3   • levothyroxine (SYNTHROID) 50 MCG Tab TAKE 1 TAB BY MOUTH EVERY MORNING ON AN EMPTY STOMACH. 30 Tab 1   • metoprolol (LOPRESSOR) 100 MG Tab Take 1 Tab by mouth 2 times a day. 180 Tab 3   • lisinopril (PRINIVIL) 20 MG Tab Take 2 Tabs by mouth 2 times a day. 180 Tab 3   • amLODIPine (NORVASC) 10 MG Tab Take 1 Tab by mouth every day. 90 Tab 3   • aspirin (ASA) 81 MG Chew Tab chewable tablet Take 81 mg by mouth every day.       No current facility-administered medications for this visit.      She  has a past medical history of Abnormal cardiovascular stress test, Blood clotting disorder ([facility]), CAD (coronary artery disease), Dental disorder, Diabetes ([facility]), Dialysis patient ([facility]), High cholesterol, Hyperlipidemia, Hypertension, Kidney disease, and Kidney transplant candidate.    ROS  No chest pain, no shortness of breath, no abdominal pain  Positive ROS as per HPI.  All other systems reviewed and are negative.     Objective:     /60 (BP Location: Right arm, Patient Position: Sitting, BP Cuff Size: Adult)   Pulse 72   Temp 36.6 °C (97.9 °F) (Temporal)   Ht 1.524 m (5')   Wt 56.3 kg (124 lb 3.2 oz)   SpO2 96%  Body mass index is 24.26 kg/m².     Physical Exam:  Constitutional: Alert, no distress.  Skin: Warm, dry, good turgor, no rashes in visible areas.  Eye: Equal, round, conjunctiva clear, lids normal.  ENMT: Lips without lesions, good dentition  Neck: Trachea midline,  Respiratory: Unlabored respiratory effort, lungs clear to auscultation, no wheezes, no ronchi.  Cardiovascular: Normal S1, S2, no murmur, no edema.  Psych: Alert and oriented x3, normal affect and mood.      Assessment and Plan:   The following treatment plan was discussed    1. Hospital discharge follow-up  Stable  Continue with tri weekly dialysis  Follow up with nephrology, endocrinology, cardiology    2. Poor appetite  Unstable  May be due to chronic disease, but I suspect it is more due to her anxiety/depression/worry about her  medical condition which we discussed through .   She does not want to take any medication for depression at this time, will continue working on maintaining adequate nutrition despite not feeling like eating.     3. RLS (restless legs syndrome)  Stable  Continue Ropinirole 1 mg nightly and prior to dialysis.       Followup: Return in about 2 months (around 4/7/2020).    I have placed the below orders and discussed them with an approved delegating provider. The MA is performing the below orders under the direction of Dr. Davila

## 2020-02-11 RX ORDER — LEVOTHYROXINE SODIUM 0.05 MG/1
50 TABLET ORAL
Qty: 30 TAB | Refills: 1 | Status: SHIPPED | OUTPATIENT
Start: 2020-02-11 | End: 2020-02-20 | Stop reason: SDUPTHER

## 2020-02-11 NOTE — TELEPHONE ENCOUNTER
Received request via: Pharmacy    Was the patient seen in the last year in this department? Yes    Does the patient have an active prescription (recently filled or refills available) for medication(s) requested? No     levothyroxine (SYNTHROID) 50 MCG Tab        Sig: TAKE 1 TAB BY MOUTH EVERY MORNING ON AN EMPTY STOMACH.

## 2020-02-19 ENCOUNTER — HOSPITAL ENCOUNTER (OUTPATIENT)
Dept: LAB | Facility: MEDICAL CENTER | Age: 70
End: 2020-02-19
Attending: INTERNAL MEDICINE
Payer: MEDICARE

## 2020-02-19 ENCOUNTER — OFFICE VISIT (OUTPATIENT)
Dept: ENDOCRINOLOGY | Facility: MEDICAL CENTER | Age: 70
End: 2020-02-19
Payer: MEDICARE

## 2020-02-19 VITALS
HEART RATE: 72 BPM | HEIGHT: 60 IN | OXYGEN SATURATION: 97 % | SYSTOLIC BLOOD PRESSURE: 120 MMHG | BODY MASS INDEX: 24.35 KG/M2 | DIASTOLIC BLOOD PRESSURE: 68 MMHG | WEIGHT: 124 LBS

## 2020-02-19 DIAGNOSIS — I10 ESSENTIAL HYPERTENSION: ICD-10-CM

## 2020-02-19 DIAGNOSIS — E78.2 MIXED HYPERLIPIDEMIA: ICD-10-CM

## 2020-02-19 DIAGNOSIS — E11.65 TYPE 2 DIABETES MELLITUS WITH HYPERGLYCEMIA, WITHOUT LONG-TERM CURRENT USE OF INSULIN (HCC): ICD-10-CM

## 2020-02-19 DIAGNOSIS — E03.8 SUBCLINICAL HYPOTHYROIDISM: ICD-10-CM

## 2020-02-19 DIAGNOSIS — N18.6 ESRD (END STAGE RENAL DISEASE) (HCC): ICD-10-CM

## 2020-02-19 LAB
ALBUMIN SERPL BCP-MCNC: 4.5 G/DL (ref 3.2–4.9)
ALBUMIN/GLOB SERPL: 1.6 G/DL
ALP SERPL-CCNC: 72 U/L (ref 30–99)
ALT SERPL-CCNC: 11 U/L (ref 2–50)
ANION GAP SERPL CALC-SCNC: 10 MMOL/L (ref 0–11.9)
AST SERPL-CCNC: 17 U/L (ref 12–45)
BILIRUB SERPL-MCNC: 0.5 MG/DL (ref 0.1–1.5)
BUN SERPL-MCNC: 10 MG/DL (ref 8–22)
CALCIUM SERPL-MCNC: 10.1 MG/DL (ref 8.5–10.5)
CHLORIDE SERPL-SCNC: 94 MMOL/L (ref 96–112)
CO2 SERPL-SCNC: 33 MMOL/L (ref 20–33)
CREAT SERPL-MCNC: 4.55 MG/DL (ref 0.5–1.4)
GLOBULIN SER CALC-MCNC: 2.9 G/DL (ref 1.9–3.5)
GLUCOSE SERPL-MCNC: 106 MG/DL (ref 65–99)
HBA1C MFR BLD: 5.6 % (ref 0–5.6)
INT CON NEG: NORMAL
INT CON POS: NORMAL
POTASSIUM SERPL-SCNC: 4.4 MMOL/L (ref 3.6–5.5)
PROT SERPL-MCNC: 7.4 G/DL (ref 6–8.2)
SODIUM SERPL-SCNC: 137 MMOL/L (ref 135–145)

## 2020-02-19 PROCEDURE — 84439 ASSAY OF FREE THYROXINE: CPT

## 2020-02-19 PROCEDURE — 36415 COLL VENOUS BLD VENIPUNCTURE: CPT

## 2020-02-19 PROCEDURE — 83036 HEMOGLOBIN GLYCOSYLATED A1C: CPT | Performed by: INTERNAL MEDICINE

## 2020-02-19 PROCEDURE — 84443 ASSAY THYROID STIM HORMONE: CPT

## 2020-02-19 PROCEDURE — 80053 COMPREHEN METABOLIC PANEL: CPT

## 2020-02-19 PROCEDURE — 99214 OFFICE O/P EST MOD 30 MIN: CPT | Performed by: INTERNAL MEDICINE

## 2020-02-19 RX ORDER — DULAGLUTIDE 1.5 MG/.5ML
1.5 INJECTION, SOLUTION SUBCUTANEOUS
Qty: 4 PEN | Refills: 6 | Status: SHIPPED | OUTPATIENT
Start: 2020-02-19 | End: 2020-07-07

## 2020-02-20 LAB
T4 FREE SERPL-MCNC: 1.33 NG/DL (ref 0.53–1.43)
TSH SERPL DL<=0.005 MIU/L-ACNC: 2.01 UIU/ML (ref 0.38–5.33)

## 2020-02-20 RX ORDER — LEVOTHYROXINE SODIUM 0.05 MG/1
50 TABLET ORAL
Qty: 30 TAB | Refills: 6 | Status: SHIPPED | OUTPATIENT
Start: 2020-02-20 | End: 2020-07-07 | Stop reason: SDUPTHER

## 2020-02-20 NOTE — PROGRESS NOTES
CHIEF COMPLAINT: Patient is here for follow up of Type 2 Diabetes Mellitus    HPI:     Tere Gallegos is a 68 y.o. female with Type 2 Diabetes Mellitus here for follow up.     Labs from February 19, 2020 shows her A1c has improved to 5.6%.  Previously her A1c was elevated at 8.7%.    I initially saw her as a consult from Kathy CHAPA for poorly controlled diabetes at baseline while not taking any medications.  She has a history of end-stage renal disease and is on hemodialysis and also has a history of subclinical hypothyroidism and hyperlipidemia.   After her initial consultation visit I started her on Trulicity       On follow-up she continues to stay on Trulicity 1.5 mg weekly for her diabetes.   She reports feeling better.  She denies nausea and vomiting.  She denies hyperglycemia and hypoglycemia.  She is testing her sugars twice a day with a generic meter and blood sugars range from  without hypoglycemia      She also had uncontrolled subclinical hypothyroidism at baseline with suboptimal TSH of 3.2 in April.  She finally picked up the new prescription of levothyroxine 50 MCG daily and she reports good compliance.  Her TSH levels are overdue.      She has hyperlipidemia that is fairly controlled and is taking atorvastatin and she is tolerating this medication well.  She is overdue for a lipid panel      She also has essential hypertension and is taking metoprolol, lisinopril and amlodipine and blood pressures are well controlled and she is tolerating her medications well      BG Diary:   Breakfast 90, 80, 100, 120    Weight has been stable    Diabetes Complications   Retinopathy: No known retinopathy.  Last eye exam: I have referred her for a dilated eye exam to ophthalmology  Neuropathy: Denies paresthesias or numbness in hands or feet. Denies any foot wounds.  Exercise: Minimal.  Diet: Fair.  Patient's medications, allergies, and social histories were reviewed and updated as  appropriate.    ROS:     CONS:     No fever, no chills   EYES:     No diplopia, no blurry vision   CV:           No chest pain, no palpitations   PULM:     No SOB, no cough, no hemoptysis.   GI:            No nausea, no vomiting, no diarrhea, no constipation   ENDO:     No polyuria, no polydipsia, no heat intolerance, no cold intolerance       Past Medical History:  Problem List:  2020-02: Hospital discharge follow-up  2020-02: Poor appetite  2020-01: QT prolongation  2019-11: Hyperlipidemia  2019-07: Green stool  2019-04: Postmenopausal  2019-03: Itching  2019-03: Subclinical hypothyroidism  2018-12: Abdominal pain  2018-12: Hypertensive urgency  2018-12: Transaminitis  2018-11: LUQ pain  2018-01: No appetite  2017-10: Acute hypoxemic respiratory failure ([facility])  2017-10: ESRD (end stage renal disease) ([facility])  2017-10: Acute on chronic diastolic CHF (congestive heart failure)   ([facility])  2016-09: Encounter for cervical Pap smear with pelvic exam  2016-09: Chronic total occlusion of native coronary artery  2016-09: S/P coronary artery stent placement  2016-09: Encounter to establish care with new doctor  2016-08: Renal hypertension  2016-08: End stage renal failure on dialysis ([facility])  2016-08: RLS (restless legs syndrome)  2016-08: Type 2 diabetes mellitus with hyperglycemia, without long-  term current use of insulin ([facility])  2014-01: Hyperkalemia  2013-09: Essential hypertension  Kidney transplant candidate  Abnormal cardiovascular stress test      Past Surgical History:  Past Surgical History:   Procedure Laterality Date   • [facility] CARDIAC CATH  9/7/2016    RCA stented with 2 Synergy drug-eluting stents.   • RECOVERY  8/16/2016    Procedure: CATH LAB [facility] WITH POSSIBLE DR. CASTILLO;  Surgeon: Recoveryonly Surgery;  Location: SURGERY PRE-POST PROC UNIT [facility];  Service:    • [facility] CARDIAC CATH  8/16/16    100% RCA   • OTHER Left 2014    left arm upper extremity fistula   • OTHER ABDOMINAL SURGERY      left kidney removed due to  cancer        Allergies:  Patient has no known allergies.     Social History:  Social History     Tobacco Use   • Smoking status: Never Smoker   • Smokeless tobacco: Never Used   Substance Use Topics   • Alcohol use: No     Alcohol/week: 0.0 oz   • Drug use: No        Family History:   family history includes Diabetes in her brother and sister; Other in her sister.      PHYSICAL EXAM:   OBJECTIVE:  Vital signs: /68   Pulse 72   Ht 1.524 m (5')   Wt 56.2 kg (124 lb)   LMP  (LMP Unknown)   SpO2 97%   BMI 24.22 kg/m²   GENERAL: Well-developed, well-nourished in no apparent distress.   EYE:  No ocular asymmetry, PERRLA  HENT: Pink, moist mucous membranes.    NECK: No thyromegaly.   CARDIOVASCULAR:  No murmurs  LUNGS: Clear breath sounds  ABDOMEN: Soft, nontender   EXTREMITIES: No clubbing, cyanosis, or edema.   NEUROLOGICAL: No gross focal motor abnormalities   LYMPH: No cervical adenopathy palpated.   SKIN: No rashes, lesions.   Monofilament testing with a 10 gram force: sensation: intact bilaterally  Visual Inspection: Feet without maceration, ulcers, or fissures.  Pedal pulses: intact bilaterally    Labs:  Lab Results   Component Value Date/Time    HBA1C 5.6 02/19/2020 03:53 PM        Lab Results   Component Value Date/Time    WBC 3.1 (L) 01/23/2020 04:42 AM    RBC 2.72 (L) 01/23/2020 04:42 AM    HEMOGLOBIN 8.2 (L) 01/23/2020 04:42 AM    MCV 87.5 01/23/2020 04:42 AM    MCH 30.1 01/23/2020 04:42 AM    MCHC 34.5 01/23/2020 04:42 AM    RDW 42.1 01/23/2020 04:42 AM    MPV 11.2 01/23/2020 04:42 AM       Lab Results   Component Value Date/Time    SODIUM 136 01/23/2020 04:42 AM    POTASSIUM 4.1 01/23/2020 04:42 AM    CHLORIDE 96 01/23/2020 04:42 AM    CO2 31 01/23/2020 04:42 AM    ANION 9.0 01/23/2020 04:42 AM    GLUCOSE 98 01/23/2020 04:42 AM    BUN 10 01/23/2020 04:42 AM    CREATININE 3.00 (H) 01/23/2020 04:42 AM    CALCIUM 9.9 01/23/2020 04:42 AM    ASTSGOT 22 01/22/2020 01:56 PM    ALTSGPT 12 01/22/2020  01:56 PM    TBILIRUBIN 0.6 01/22/2020 01:56 PM    ALBUMIN 3.9 01/22/2020 01:56 PM    TOTPROTEIN 6.6 01/22/2020 01:56 PM    GLOBULIN 2.7 01/22/2020 01:56 PM    AGRATIO 1.4 01/22/2020 01:56 PM       Lab Results   Component Value Date/Time    CHOLSTRLTOT 91 (L) 12/23/2018 0101    TRIGLYCERIDE 139 12/23/2018 0101    HDL 30 (A) 12/23/2018 0101    LDL 33 12/23/2018 0101       No results found for: MICROALBCALC, MALBCRT, MALBEXCR, MPKZPY22, MICROALBUR, MICRALB, UMICROALBUM, MICROALBTIM     Lab Results   Component Value Date/Time    TSHULTRASEN 3.220 11/12/2019 1247     No results found for: FREEDIR  No results found for: FREET3  No results found for: THYSTIMIG        ASSESSMENT/PLAN:     1. Type 2 diabetes mellitus with hyperglycemia, without long-term current use of insulin ([facility])  Significantly improved control  A1c is now improved at 5.6%  Continue Trulicity 1.5 mg weekly  Advised patient to watch her diet and carbohydrate intake and exercise regularly  We will plan for follow-up in 3 months with a repeat of her A1c and other labs      2. Subclinical hypothyroidism  Uncontrolled suboptimal TSH at baseline  She is overdue for labs I am checking a TSH today  Continue levothyroxine 50 MCG daily  If her labs are uncontrolled I will adjust her levothyroxine dose if indicated  We will plan to repeat her TSH again in 3 months  Reviewed the importance of adherence to thyroid hormone replacement therapy.      3. Mixed hyperlipidemia  Well-controlled, continue atorvastatin   follow low-fat diet  She is overdue for a lipid panel   I will check a fasting lipid profile with her next labs in 3 months      4. ESRD (end stage renal disease) ([facility])  Advised patient avoid potential nephrotoxins such as NSAIDs and IV contrast  Advised patient to remain well-hydrated      5. Essential hypertension  Well-controlled continue current medications  I am checking a urine microalbumin creatinine ratio today      Return in about 3 months (around  5/19/2020).      Thank you kindly for allowing me to participate in the diabetes care plan for this patient.    Jimmy Bloom MD, [facility]  12/05/19    CC:   ANGELITA Concepcion

## 2020-02-21 ENCOUNTER — TELEPHONE (OUTPATIENT)
Dept: ENDOCRINOLOGY | Facility: MEDICAL CENTER | Age: 70
End: 2020-02-21

## 2020-02-21 NOTE — TELEPHONE ENCOUNTER
----- Message from Jimmy Bloom M.D. sent at 2/20/2020  8:04 AM PST -----  Patient needs     Please tell patient thyroid labs are good - stay on levothyroxine 50mcg daily-  I will refill her medication  Stay on trulicity as well    Remind patient to get an eye exam - we sent a referral    Remind patient to do labs before appt next time     Thanks     Dr. Bloom

## 2020-02-25 ENCOUNTER — OFFICE VISIT (OUTPATIENT)
Dept: CARDIOLOGY | Facility: MEDICAL CENTER | Age: 70
End: 2020-02-25
Payer: MEDICARE

## 2020-02-25 VITALS
BODY MASS INDEX: 24.44 KG/M2 | OXYGEN SATURATION: 99 % | WEIGHT: 124.5 LBS | HEART RATE: 66 BPM | SYSTOLIC BLOOD PRESSURE: 150 MMHG | RESPIRATION RATE: 16 BRPM | DIASTOLIC BLOOD PRESSURE: 60 MMHG | HEIGHT: 60 IN

## 2020-02-25 DIAGNOSIS — Z79.899 HIGH RISK MEDICATION USE: ICD-10-CM

## 2020-02-25 DIAGNOSIS — R06.09 DYSPNEA ON EXERTION: ICD-10-CM

## 2020-02-25 DIAGNOSIS — I25.10 CORONARY ARTERY DISEASE INVOLVING NATIVE CORONARY ARTERY OF NATIVE HEART WITHOUT ANGINA PECTORIS: ICD-10-CM

## 2020-02-25 DIAGNOSIS — I13.2 HYPERTENSIVE HEART AND KIDNEY DISEASE WITH CHRONIC DIASTOLIC CONGESTIVE HEART FAILURE AND STAGE 5 CHRONIC KIDNEY DISEASE ON CHRONIC DIALYSIS (HCC): ICD-10-CM

## 2020-02-25 DIAGNOSIS — N18.6 HYPERTENSIVE HEART AND KIDNEY DISEASE WITH CHRONIC DIASTOLIC CONGESTIVE HEART FAILURE AND STAGE 5 CHRONIC KIDNEY DISEASE ON CHRONIC DIALYSIS (HCC): ICD-10-CM

## 2020-02-25 DIAGNOSIS — I50.32 HYPERTENSIVE HEART AND KIDNEY DISEASE WITH CHRONIC DIASTOLIC CONGESTIVE HEART FAILURE AND STAGE 5 CHRONIC KIDNEY DISEASE ON CHRONIC DIALYSIS (HCC): ICD-10-CM

## 2020-02-25 DIAGNOSIS — Z99.2 HYPERTENSIVE HEART AND KIDNEY DISEASE WITH CHRONIC DIASTOLIC CONGESTIVE HEART FAILURE AND STAGE 5 CHRONIC KIDNEY DISEASE ON CHRONIC DIALYSIS (HCC): ICD-10-CM

## 2020-02-25 DIAGNOSIS — N18.6 ESRD (END STAGE RENAL DISEASE) (HCC): ICD-10-CM

## 2020-02-25 DIAGNOSIS — Z95.5 S/P CORONARY ARTERY STENT PLACEMENT: ICD-10-CM

## 2020-02-25 PROCEDURE — 94618 PULMONARY STRESS TESTING: CPT | Performed by: INTERNAL MEDICINE

## 2020-02-25 PROCEDURE — 99214 OFFICE O/P EST MOD 30 MIN: CPT | Mod: 25 | Performed by: INTERNAL MEDICINE

## 2020-02-25 RX ORDER — HYDRALAZINE HYDROCHLORIDE 100 MG/1
100 TABLET, FILM COATED ORAL 2 TIMES DAILY
Qty: 180 TAB | Refills: 3 | Status: SHIPPED | OUTPATIENT
Start: 2020-02-25 | End: 2020-06-09 | Stop reason: SDUPTHER

## 2020-02-25 RX ORDER — HYDRALAZINE HYDROCHLORIDE 100 MG/1
100 TABLET, FILM COATED ORAL 2 TIMES DAILY
Qty: 180 TAB | Refills: 3 | Status: SHIPPED | OUTPATIENT
Start: 2020-02-25 | End: 2020-02-25 | Stop reason: SDUPTHER

## 2020-02-25 ASSESSMENT — ENCOUNTER SYMPTOMS
DOUBLE VISION: 0
COUGH: 0
EYE DISCHARGE: 0
DEPRESSION: 0
PALPITATIONS: 0
FALLS: 0
MYALGIAS: 0
VOMITING: 0
BLOOD IN STOOL: 0
SHORTNESS OF BREATH: 0
HEADACHES: 0
CLAUDICATION: 0
SENSORY CHANGE: 0
SPEECH CHANGE: 0
BRUISES/BLEEDS EASILY: 0
ABDOMINAL PAIN: 0
LOSS OF CONSCIOUSNESS: 0
BLURRED VISION: 0
DIZZINESS: 0
FEVER: 0
PND: 0
ORTHOPNEA: 0
WEIGHT LOSS: 0
NAUSEA: 0
CHILLS: 0
EYE PAIN: 0
HALLUCINATIONS: 0

## 2020-02-25 ASSESSMENT — 6 MINUTE WALK TEST (6MWT): TOTAL DISTANCE WALKED (METERS): 329

## 2020-02-25 NOTE — PROGRESS NOTES
Chief Complaint   Patient presents with   • Congestive Heart Failure       Subjective:   Tere Swann is a 69 y.o. female who presents today for cardiac care and evaluation for recent hospitalization due to hypertensive emergency with volume overloaded state. Of note, patient presented to hospital with extremely high blood pressure. She was diagnosed with hypertensive emergency. She has a history of end-stage renal disease for which she is on dialysis at this time. She has normal left ventricular systolic function and no significant valvular disease. She does have history of coronary stenting in the past in the RCA.  She was dialyzed and controlled with blood pressure medications.  Of note, she has had previous hospitalization for similar presentation.     Patient is feeling better these days. Does get winded upon walking up inclines or for distance. No symptoms at rest or with daily living activities.    02/2020 Patient was able to complete 329 m during his 6 minute walk test. her O2 saturation at baseline was 99% and at the end of the test, the O2 saturation was 98%. she reported 0 level of dyspnea on Carlos scale.     Patient has not being able to follow with her primary cardiologist for unclear reasons despite several of our attempts to send her back. We will refer her back to her primary cardiologist for future visits.    Past Medical History:   Diagnosis Date   • Abnormal cardiovascular stress test    • Blood clotting disorder ([facility])     with AVF   • CAD (coronary artery disease)    • Dental disorder     partial dentures- uppers   • Diabetes ([facility])     oral medication   • Dialysis patient ([facility])     [facility]   • High cholesterol    • Hyperlipidemia    • Hypertension    • Kidney disease     renal failure , on dialysis, M, W, F.   • Kidney transplant candidate      Past Surgical History:   Procedure Laterality Date   • [facility] CARDIAC CATH  9/7/2016    RCA stented with 2 Synergy drug-eluting stents.    • RECOVERY  8/16/2016    Procedure: CATH LAB [facility] WITH POSSIBLE DR. CASTILLO;  Surgeon: [facility];  Location: SURGERY PRE-POST PROC UNIT [facility];  Service:    • [facility] CARDIAC CATH  8/16/16    100% RCA   • OTHER Left 2014    left arm upper extremity fistula   • OTHER ABDOMINAL SURGERY      left kidney removed due to cancer     Family History   Problem Relation Age of Onset   • Diabetes Sister    • Other Sister         liver disease   • Diabetes Brother    • Heart Disease Neg Hx      Social History     Socioeconomic History   • Marital status:      Spouse name: Not on file   • Number of children: Not on file   • Years of education: Not on file   • Highest education level: Not on file   Occupational History   • Not on file   Social Needs   • Financial resource strain: Not on file   • Food insecurity     Worry: Not on file     Inability: Not on file   • Transportation needs     Medical: Not on file     Non-medical: Not on file   Tobacco Use   • Smoking status: Never Smoker   • Smokeless tobacco: Never Used   Substance and Sexual Activity   • Alcohol use: No     Alcohol/week: 0.0 oz   • Drug use: No   • Sexual activity: Never   Lifestyle   • Physical activity     Days per week: Not on file     Minutes per session: Not on file   • Stress: Not on file   Relationships   • Social connections     Talks on phone: Not on file     Gets together: Not on file     Attends Nondenominational service: Not on file     Active member of club or organization: Not on file     Attends meetings of clubs or organizations: Not on file     Relationship status: Not on file   • Intimate partner violence     Fear of current or ex partner: Not on file     Emotionally abused: Not on file     Physically abused: Not on file     Forced sexual activity: Not on file   Other Topics Concern   • Not on file   Social History Narrative   • Not on file     No Known Allergies  Outpatient Encounter Medications as of 2/25/2020   Medication Sig Dispense  Refill   • levothyroxine (SYNTHROID) 50 MCG Tab Take 1 Tab by mouth Every morning on an empty stomach. 30 Tab 6   • Dulaglutide (TRULICITY) 1.5 MG/0.5ML Solution Pen-injector Inject 1.5 mg as instructed every 7 days. On Tue 4 PEN 6   • atorvastatin (LIPITOR) 20 MG Tab Take 20 mg by mouth every evening.     • B Complex-C-Folic Acid (RENAL-SALVATORE) 0.8 MG Tab Take 1 Tab by mouth See Admin Instructions. Pt takes on Mon, Wed, and Fri before dialysis     • ROPINIRole (REQUIP) 1 MG Tab Take 1 mg by mouth See Admin Instructions. take 1 tablet before dialysis and before bed as needed for restless legs  Dialysis is on Mon, Wed, and Fri     • furosemide (LASIX) 40 MG Tab Take 1 Tab by mouth every day. Hold for SBP less than 100 90 Tab 3   • metoprolol (LOPRESSOR) 100 MG Tab Take 1 Tab by mouth 2 times a day. 180 Tab 3   • lisinopril (PRINIVIL) 20 MG Tab Take 2 Tabs by mouth 2 times a day. 180 Tab 3   • amLODIPine (NORVASC) 10 MG Tab Take 1 Tab by mouth every day. 90 Tab 3   • aspirin (ASA) 81 MG Chew Tab chewable tablet Take 81 mg by mouth every day.       No facility-administered encounter medications on file as of 2/25/2020.      Review of Systems   Constitutional: Negative for chills, fever, malaise/fatigue and weight loss.   HENT: Negative for ear discharge, ear pain, hearing loss and nosebleeds.    Eyes: Negative for blurred vision, double vision, pain and discharge.   Respiratory: Negative for cough and shortness of breath.    Cardiovascular: Negative for chest pain, palpitations, orthopnea, claudication, leg swelling and PND.   Gastrointestinal: Negative for abdominal pain, blood in stool, melena, nausea and vomiting.   Genitourinary: Negative for dysuria and hematuria.   Musculoskeletal: Negative for falls, joint pain and myalgias.   Skin: Negative for itching and rash.   Neurological: Negative for dizziness, sensory change, speech change, loss of consciousness and headaches.   Endo/Heme/Allergies: Negative for  environmental allergies. Does not bruise/bleed easily.   Psychiatric/Behavioral: Negative for depression, hallucinations and suicidal ideas.        Objective:   /60 (BP Location: Right arm, Patient Position: Sitting, BP Cuff Size: Adult)   Pulse 66   Resp 16   Ht 1.524 m (5')   Wt 56.5 kg (124 lb 8 oz)   LMP  (LMP Unknown)   SpO2 99%   BMI 24.31 kg/m²     Physical Exam   Constitutional: She is oriented to person, place, and time. No distress.   HENT:   Head: Normocephalic and atraumatic.   Right Ear: External ear normal.   Left Ear: External ear normal.   Eyes: Right eye exhibits no discharge. Left eye exhibits no discharge.   Neck: No JVD present. No thyromegaly present.   Cardiovascular: Normal rate, regular rhythm, normal heart sounds and intact distal pulses. Exam reveals no gallop and no friction rub.   No murmur heard.  Pulmonary/Chest: Breath sounds normal. No respiratory distress.   Abdominal: Bowel sounds are normal. She exhibits no distension. There is no abdominal tenderness.   Musculoskeletal:         General: No tenderness or edema.   Neurological: She is alert and oriented to person, place, and time. No cranial nerve deficit.   Skin: Skin is warm and dry. She is not diaphoretic.   Psychiatric: She has a normal mood and affect. Her behavior is normal.   Nursing note and vitals reviewed.      Assessment:     1. Hypertensive heart and kidney disease with chronic diastolic congestive heart failure and stage 5 chronic kidney disease on chronic dialysis ([facility])     2. ESRD (end stage renal disease) ([facility])     3. S/P coronary artery stent placement     4. Coronary artery disease involving native coronary artery of native heart without angina pectoris     5. High risk medication use         Medical Decision Making:  Today's Assessment / Status / Plan:   Hypertensive heart disease with ESRD:  Today, based on physical examination findings, patient is euvolemic. No JVD, lungs are clear to auscultation,  no pitting edema in bilateral lower extremities, no ascites.     Dry weight is 124 lbs.     Blood pressure is not well controlled.      Continue Lisinpril 20 mg bid.     Will continue Metoprolol to 100 mg bid from once a day (which she reported that she was taking). Continue Amlodipine 10 mg daily.    Patient appears to have poor insight to her medical problems.  I spent a significant amount of time with an  explaining to her the nature of her disease process.  Overall, I do think that her volume overloaded state was likely related to uncontrolled hypertension hence hypertensive emergency.  I do not think she has primary cardiac cause of her volume overloaded state.  Therefore, it is extremely vital for her to adhere to medical therapy, controlling her blood pressure.  This will help to avoid future exacerbation along with hospitalization.    Because of such, I will add hydralazine 100 mg p.o. twice a day to her regimen.  I personally give her my prescription in paper form and patient will take it to her pharmacy of choice to ensure that she has proper medications.     Coronary arterial disease with prior RCA stent 09/2016:  Continue asa, BB, ACE-I and statin.     Hypertension:  Optimize control using medical regimen as well.     Hyperlipidemia:  Optimize statin as within guidelines of CAD treatment as above.   Atorvastatin 20 mg po qhs.     We will refer her back to her primary cardiologist for future visits.

## 2020-03-02 RX ORDER — HYDROXYZINE HYDROCHLORIDE 25 MG/1
TABLET, FILM COATED ORAL
Qty: 90 TAB | Refills: 0 | Status: SHIPPED | OUTPATIENT
Start: 2020-03-02 | End: 2020-07-29 | Stop reason: SDUPTHER

## 2020-03-24 DIAGNOSIS — Z99.2 END STAGE RENAL FAILURE ON DIALYSIS (HCC): ICD-10-CM

## 2020-03-24 DIAGNOSIS — Z95.5 S/P CORONARY ARTERY STENT PLACEMENT: Primary | ICD-10-CM

## 2020-03-24 DIAGNOSIS — N18.6 END STAGE RENAL FAILURE ON DIALYSIS (HCC): ICD-10-CM

## 2020-03-25 RX ORDER — ASPIRIN 81 MG/1
TABLET, CHEWABLE ORAL
Qty: 90 TAB | Refills: 3 | Status: SHIPPED | OUTPATIENT
Start: 2020-03-25 | End: 2021-04-13

## 2020-04-08 ENCOUNTER — APPOINTMENT (OUTPATIENT)
Dept: MEDICAL GROUP | Facility: MEDICAL CENTER | Age: 70
End: 2020-04-08
Payer: MEDICARE

## 2020-04-27 ENCOUNTER — HOSPITAL ENCOUNTER (OUTPATIENT)
Dept: LAB | Facility: MEDICAL CENTER | Age: 70
End: 2020-04-27
Attending: INTERNAL MEDICINE
Payer: MEDICARE

## 2020-04-27 DIAGNOSIS — E03.8 SUBCLINICAL HYPOTHYROIDISM: ICD-10-CM

## 2020-04-27 DIAGNOSIS — E78.2 MIXED HYPERLIPIDEMIA: ICD-10-CM

## 2020-04-27 DIAGNOSIS — N18.6 ESRD (END STAGE RENAL DISEASE) (HCC): ICD-10-CM

## 2020-04-27 DIAGNOSIS — E11.65 TYPE 2 DIABETES MELLITUS WITH HYPERGLYCEMIA, WITHOUT LONG-TERM CURRENT USE OF INSULIN (HCC): ICD-10-CM

## 2020-04-27 LAB
ALBUMIN SERPL BCP-MCNC: 4.4 G/DL (ref 3.2–4.9)
ALBUMIN/GLOB SERPL: 1.6 G/DL
ALP SERPL-CCNC: 81 U/L (ref 30–99)
ALT SERPL-CCNC: 74 U/L (ref 2–50)
ANION GAP SERPL CALC-SCNC: 13 MMOL/L (ref 7–16)
AST SERPL-CCNC: 73 U/L (ref 12–45)
BILIRUB SERPL-MCNC: 0.5 MG/DL (ref 0.1–1.5)
BUN SERPL-MCNC: 10 MG/DL (ref 8–22)
CALCIUM SERPL-MCNC: 9.6 MG/DL (ref 8.4–10.2)
CHLORIDE SERPL-SCNC: 95 MMOL/L (ref 96–112)
CHOLEST SERPL-MCNC: 104 MG/DL (ref 100–199)
CO2 SERPL-SCNC: 31 MMOL/L (ref 20–33)
CREAT SERPL-MCNC: 3.27 MG/DL (ref 0.5–1.4)
EST. AVERAGE GLUCOSE BLD GHB EST-MCNC: 105 MG/DL
FASTING STATUS PATIENT QL REPORTED: NORMAL
GLOBULIN SER CALC-MCNC: 2.8 G/DL (ref 1.9–3.5)
GLUCOSE SERPL-MCNC: 88 MG/DL (ref 65–99)
HBA1C MFR BLD: 5.3 % (ref 0–5.6)
HDLC SERPL-MCNC: 47 MG/DL
LDLC SERPL CALC-MCNC: 37 MG/DL
POTASSIUM SERPL-SCNC: 3.8 MMOL/L (ref 3.6–5.5)
PROT SERPL-MCNC: 7.2 G/DL (ref 6–8.2)
SODIUM SERPL-SCNC: 139 MMOL/L (ref 135–145)
T4 FREE SERPL-MCNC: 1.84 NG/DL (ref 0.58–1.64)
TRIGL SERPL-MCNC: 99 MG/DL (ref 0–149)
TSH SERPL DL<=0.005 MIU/L-ACNC: 2.24 UIU/ML (ref 0.38–5.33)

## 2020-04-27 PROCEDURE — 36415 COLL VENOUS BLD VENIPUNCTURE: CPT | Mod: GA

## 2020-04-27 PROCEDURE — 84443 ASSAY THYROID STIM HORMONE: CPT

## 2020-04-27 PROCEDURE — 80053 COMPREHEN METABOLIC PANEL: CPT

## 2020-04-27 PROCEDURE — 83036 HEMOGLOBIN GLYCOSYLATED A1C: CPT | Mod: GA

## 2020-04-27 PROCEDURE — 84439 ASSAY OF FREE THYROXINE: CPT

## 2020-04-27 PROCEDURE — 80061 LIPID PANEL: CPT

## 2020-04-28 ENCOUNTER — HOSPITAL ENCOUNTER (OUTPATIENT)
Dept: LAB | Facility: MEDICAL CENTER | Age: 70
End: 2020-04-28
Attending: INTERNAL MEDICINE
Payer: MEDICARE

## 2020-04-29 ENCOUNTER — HOSPITAL ENCOUNTER (OUTPATIENT)
Facility: MEDICAL CENTER | Age: 70
End: 2020-04-29
Attending: INTERNAL MEDICINE
Payer: MEDICARE

## 2020-04-29 LAB
CREAT UR-MCNC: 13.36 MG/DL
MICROALBUMIN UR-MCNC: 107.9 MG/DL
MICROALBUMIN/CREAT UR: 8076 MG/G (ref 0–30)

## 2020-04-29 PROCEDURE — 82570 ASSAY OF URINE CREATININE: CPT

## 2020-04-29 PROCEDURE — 82043 UR ALBUMIN QUANTITATIVE: CPT

## 2020-05-04 ENCOUNTER — OFFICE VISIT (OUTPATIENT)
Dept: MEDICAL GROUP | Facility: MEDICAL CENTER | Age: 70
End: 2020-05-04
Payer: MEDICARE

## 2020-05-04 VITALS
BODY MASS INDEX: 23.75 KG/M2 | WEIGHT: 121 LBS | DIASTOLIC BLOOD PRESSURE: 80 MMHG | HEIGHT: 60 IN | TEMPERATURE: 98.1 F | SYSTOLIC BLOOD PRESSURE: 160 MMHG | OXYGEN SATURATION: 94 % | HEART RATE: 64 BPM

## 2020-05-04 DIAGNOSIS — R60.0 PEDAL EDEMA: ICD-10-CM

## 2020-05-04 DIAGNOSIS — R63.0 ANOREXIA: ICD-10-CM

## 2020-05-04 DIAGNOSIS — E11.65 TYPE 2 DIABETES MELLITUS WITH HYPERGLYCEMIA, WITHOUT LONG-TERM CURRENT USE OF INSULIN (HCC): ICD-10-CM

## 2020-05-04 DIAGNOSIS — Z76.82 KIDNEY TRANSPLANT CANDIDATE: Chronic | ICD-10-CM

## 2020-05-04 DIAGNOSIS — T50.905A MEDICATION REACTION, INITIAL ENCOUNTER: ICD-10-CM

## 2020-05-04 DIAGNOSIS — Z12.11 SCREENING FOR MALIGNANT NEOPLASM OF COLON: ICD-10-CM

## 2020-05-04 PROBLEM — I25.10 CORONARY ARTERY DISEASE INVOLVING NATIVE CORONARY ARTERY WITHOUT ANGINA PECTORIS: Status: ACTIVE | Noted: 2020-05-04

## 2020-05-04 PROBLEM — I50.32 CHRONIC DIASTOLIC HEART FAILURE (HCC): Status: ACTIVE | Noted: 2020-05-04

## 2020-05-04 PROBLEM — Z99.2 ESRD (END STAGE RENAL DISEASE) ON DIALYSIS (HCC): Status: ACTIVE | Noted: 2020-05-04

## 2020-05-04 PROBLEM — K08.9 DENTAL DISORDER: Status: ACTIVE | Noted: 2020-05-04

## 2020-05-04 PROBLEM — E03.9 ACQUIRED HYPOTHYROIDISM: Status: ACTIVE | Noted: 2020-05-04

## 2020-05-04 PROBLEM — N18.6 ESRD (END STAGE RENAL DISEASE) ON DIALYSIS (HCC): Status: ACTIVE | Noted: 2020-05-04

## 2020-05-04 PROCEDURE — 99214 OFFICE O/P EST MOD 30 MIN: CPT | Performed by: NURSE PRACTITIONER

## 2020-05-04 ASSESSMENT — FIBROSIS 4 INDEX: FIB4 SCORE: 4.5

## 2020-05-04 NOTE — PROGRESS NOTES
Subjective:     Tere Gallegos is a 69 y.o. female who presents with DM.    HPI:   Seen in f/u for DM.  She was recently started on trulicity 1.5 mg.  She has been o nmed for about 3 mo.  Ever since she started the med she is having anorexia. She has lost 3 lbs.  Food no longer tastes good.  She is followed by Dr Bloom for her DM.  She will see him in 6/3/2020.    She is having some feet swelling when she sits and does not lift her feet.  She is on dialysis 3x/wk.    She is in workup for kidney txp.  They sent her a letter stating that she needs pap smear and colonoscopy.  She will f/u here for pap smear and i will place referral for GI.  She is aware that she may not get in soon for her colonosocpy d/t lockdown.     Patient Active Problem List    Diagnosis Date Noted   • Coronary artery disease involving native coronary artery without angina pectoris 05/04/2020   • Chronic diastolic heart failure (HCC) 05/04/2020   • ESRD (end stage renal disease) on dialysis ([facility]) 05/04/2020   • Acquired hypothyroidism 05/04/2020   • Dental disorder 05/04/2020   • QT prolongation 01/22/2020   • Hyperlipidemia 11/12/2019   • Transaminitis 12/22/2018   • RLS (restless legs syndrome) 08/05/2016   • Type 2 diabetes mellitus with hyperglycemia, without long-term current use of insulin ([facility]) 08/05/2016   • Kidney transplant candidate    • Essential hypertension 09/17/2013       Current medicines (including changes today)  Current Outpatient Medications   Medication Sig Dispense Refill   • Dulaglutide 0.75 MG/0.5ML Solution Pen-injector Inject 0.5 mL as instructed every 7 days. 4 PEN 1   • aspirin (ASA) 81 MG Chew Tab chewable tablet TOME FERNANDO TABLETA TODOS LOS MCCORMICK NOT COVERED OTC 90 Tab 3   • hydrOXYzine HCl (ATARAX) 25 MG Tab TOME FERNANDO TABLETA POR VIA ORAL AL ACOSTARSE CUANDO SEA NECESARIO FOR ITCHING 90 Tab 0   • hydrALAZINE (APRESOLINE) 100 MG tablet Take 1 Tab by mouth 2 times a day. 180 Tab 3   • levothyroxine  (SYNTHROID) 50 MCG Tab Take 1 Tab by mouth Every morning on an empty stomach. 30 Tab 6   • Dulaglutide (TRULICITY) 1.5 MG/0.5ML Solution Pen-injector Inject 1.5 mg as instructed every 7 days. On Tue 4 PEN 6   • atorvastatin (LIPITOR) 20 MG Tab Take 20 mg by mouth every evening.     • B Complex-C-Folic Acid (RENAL-SALVATORE) 0.8 MG Tab Take 1 Tab by mouth See Admin Instructions. Pt takes on Mon, Wed, and Fri before dialysis     • ROPINIRole (REQUIP) 1 MG Tab Take 1 mg by mouth See Admin Instructions. take 1 tablet before dialysis and before bed as needed for restless legs  Dialysis is on Mon, Wed, and Fri     • furosemide (LASIX) 40 MG Tab Take 1 Tab by mouth every day. Hold for SBP less than 100 90 Tab 3   • metoprolol (LOPRESSOR) 100 MG Tab Take 1 Tab by mouth 2 times a day. 180 Tab 3   • lisinopril (PRINIVIL) 20 MG Tab Take 2 Tabs by mouth 2 times a day. 180 Tab 3   • amLODIPine (NORVASC) 10 MG Tab Take 1 Tab by mouth every day. 90 Tab 3     No current facility-administered medications for this visit.        No Known Allergies    ROS  Constitutional: Negative. Negative for fever, chills, weight loss, malaise/fatigue and diaphoresis.   HENT: Negative. Negative for hearing loss, ear pain, nosebleeds, congestion, sore throat, neck pain, tinnitus and ear discharge.   Respiratory: Negative. Negative for cough, hemoptysis, sputum production, shortness of breath, wheezing and stridor.   Cardiovascular: Negative. Negative for chest pain, palpitations, orthopnea, claudication, leg swelling and PND.   Gastrointestinal: Denies nausea, vomiting, diarrhea, constipation, heartburn, melena or hematochezia.  Genitourinary: Denies dysuria, hematuria, urinary incontinence, frequency or urgency.        Objective:     /80 (BP Location: Right arm, Patient Position: Sitting)   Pulse 64   Temp 36.7 °C (98.1 °F) (Temporal)   Ht 1.524 m (5')   Wt 54.9 kg (121 lb)   SpO2 94%  Body mass index is 23.63 kg/m².    Physical Exam:  Vitals  reviewed.  Constitutional: Oriented to person, place, and time. appears well-developed and well-nourished. No distress.   Cardiovascular: Normal rate, regular rhythm, normal heart sounds and intact distal pulses. Exam reveals no gallop and no friction rub. No murmur heard. No carotid bruits.   Pulmonary/Chest: Effort normal and breath sounds normal. No stridor. No respiratory distress. no wheezes or rales. exhibits no tenderness.   Musculoskeletal: Normal range of motion. exhibits no edema. gretel pedal pulses 2+.  Neurological: Alert and oriented to person, place, and time. exhibits normal muscle tone.  Skin: Skin is warm and dry. No diaphoresis.   Psychiatric: Normal mood and affect. Behavior is normal.      Assessment and Plan:     The following treatment plan was discussed:    1. Anorexia  Dulaglutide 0.75 MG/0.5ML Solution Pen-injector    pt r/o trulicity.  she will f/u with dr perdomo as sched.  will try dec to 0.75 mg dose every 7 days.    2. Pedal edema      keep feel elevated with sitting and dec na in diet.   continue dialysis   3. Medication reaction, initial encounter  Dulaglutide 0.75 MG/0.5ML Solution Pen-injector    pt r/t trulicity.  dec dose.  f/u with endocrinology   4. Kidney transplant candidate  REFERRAL TO GASTROENTEROLOGY   5. Screening for malignant neoplasm of colon  REFERRAL TO GASTROENTEROLOGY    refer GI for colonoscopy for w/u for kidney txp.  f/u here on thurs for pap smear.    6. Type 2 diabetes mellitus with hyperglycemia, without long-term current use of insulin (HCC)  Dulaglutide 0.75 MG/0.5ML Solution Pen-injector    continue f/u with endocrinology         Followup: Return in about 3 days (around 5/7/2020).

## 2020-05-11 ENCOUNTER — OFFICE VISIT (OUTPATIENT)
Dept: MEDICAL GROUP | Facility: MEDICAL CENTER | Age: 70
End: 2020-05-11
Payer: MEDICARE

## 2020-05-11 ENCOUNTER — HOSPITAL ENCOUNTER (OUTPATIENT)
Facility: MEDICAL CENTER | Age: 70
End: 2020-05-11
Attending: NURSE PRACTITIONER
Payer: MEDICARE

## 2020-05-11 VITALS
BODY MASS INDEX: 24.15 KG/M2 | HEART RATE: 78 BPM | SYSTOLIC BLOOD PRESSURE: 130 MMHG | OXYGEN SATURATION: 98 % | DIASTOLIC BLOOD PRESSURE: 60 MMHG | WEIGHT: 123 LBS | HEIGHT: 60 IN | TEMPERATURE: 98 F

## 2020-05-11 DIAGNOSIS — Z01.419 SMEAR, VAGINAL, AS PART OF ROUTINE GYNECOLOGICAL EXAMINATION: ICD-10-CM

## 2020-05-11 DIAGNOSIS — Z12.72 SMEAR, VAGINAL, AS PART OF ROUTINE GYNECOLOGICAL EXAMINATION: ICD-10-CM

## 2020-05-11 DIAGNOSIS — Z12.4 ROUTINE CERVICAL SMEAR: ICD-10-CM

## 2020-05-11 DIAGNOSIS — G25.81 RLS (RESTLESS LEGS SYNDROME): ICD-10-CM

## 2020-05-11 PROCEDURE — 87624 HPV HI-RISK TYP POOLED RSLT: CPT

## 2020-05-11 PROCEDURE — 88175 CYTOPATH C/V AUTO FLUID REDO: CPT

## 2020-05-11 PROCEDURE — G0101 CA SCREEN;PELVIC/BREAST EXAM: HCPCS | Performed by: NURSE PRACTITIONER

## 2020-05-11 RX ORDER — ROPINIROLE 1 MG/1
1 TABLET, FILM COATED ORAL SEE ADMIN INSTRUCTIONS
Qty: 180 TAB | Refills: 0 | Status: SHIPPED | OUTPATIENT
Start: 2020-05-11 | End: 2020-08-03

## 2020-05-11 ASSESSMENT — FIBROSIS 4 INDEX: FIB4 SCORE: 4.5

## 2020-05-11 NOTE — PROGRESS NOTES
Subjective:      Tere Gallegos is a 69 y.o. female who presents with pap smear          HPI  Seen in f/u for pap smear.  She is in w/u for kidney txp.  Needs a pap smear to complete her w/u.  No current gyn issues.   She is on requip for RLS.  She has sx with dialysis and sometimes at bedtime.  She hands me a bottle of requip to be refilled.  She reports that it is not helping.  The presc that we have in computer is 1 mg.  She has 0.25 mg only.  Will give refill for the 1 mg. She can take 2x/day    Patient Active Problem List    Diagnosis Date Noted   • Coronary artery disease involving native coronary artery without angina pectoris 05/04/2020   • Chronic diastolic heart failure (HCC) 05/04/2020   • ESRD (end stage renal disease) on dialysis ([facility]) 05/04/2020   • Acquired hypothyroidism 05/04/2020   • Dental disorder 05/04/2020   • QT prolongation 01/22/2020   • Hyperlipidemia 11/12/2019   • Transaminitis 12/22/2018   • RLS (restless legs syndrome) 08/05/2016   • Type 2 diabetes mellitus with hyperglycemia, without long-term current use of insulin ([facility]) 08/05/2016   • Kidney transplant candidate    • Essential hypertension 09/17/2013     Current Outpatient Medications   Medication Sig Dispense Refill   • Dulaglutide 0.75 MG/0.5ML Solution Pen-injector Inject 0.5 mL as instructed every 7 days. 4 PEN 1   • aspirin (ASA) 81 MG Chew Tab chewable tablet TOME FERNANDO TABLETA TODOS LOS MCCORMICK NOT COVERED OTC 90 Tab 3   • hydrOXYzine HCl (ATARAX) 25 MG Tab TOME FERNANDO TABLETA POR VIA ORAL AL ACOSTARSE CUANDO SEA NECESARIO FOR ITCHING 90 Tab 0   • hydrALAZINE (APRESOLINE) 100 MG tablet Take 1 Tab by mouth 2 times a day. 180 Tab 3   • levothyroxine (SYNTHROID) 50 MCG Tab Take 1 Tab by mouth Every morning on an empty stomach. 30 Tab 6   • Dulaglutide (TRULICITY) 1.5 MG/0.5ML Solution Pen-injector Inject 1.5 mg as instructed every 7 days. On Tue 4 PEN 6   • atorvastatin (LIPITOR) 20 MG Tab Take 20 mg by mouth every evening.      • B Complex-C-Folic Acid (RENAL-SALVATORE) 0.8 MG Tab Take 1 Tab by mouth See Admin Instructions. Pt takes on Mon, Wed, and Fri before dialysis     • ROPINIRole (REQUIP) 1 MG Tab Take 1 mg by mouth See Admin Instructions. take 1 tablet before dialysis and before bed as needed for restless legs  Dialysis is on Mon, Wed, and Fri     • furosemide (LASIX) 40 MG Tab Take 1 Tab by mouth every day. Hold for SBP less than 100 90 Tab 3   • metoprolol (LOPRESSOR) 100 MG Tab Take 1 Tab by mouth 2 times a day. 180 Tab 3   • lisinopril (PRINIVIL) 20 MG Tab Take 2 Tabs by mouth 2 times a day. 180 Tab 3   • amLODIPine (NORVASC) 10 MG Tab Take 1 Tab by mouth every day. 90 Tab 3     No current facility-administered medications for this visit.      Patient has no known allergies.    ROS  Review of Systems   Constitutional: Negative.  Negative for fever, chills, weight loss, malaise/fatigue and diaphoresis.   HENT: Negative.  Negative for hearing loss, ear pain, nosebleeds, congestion, sore throat, neck pain, tinnitus and ear discharge.    Eyes: Negative.  Negative for blurred vision, double vision, photophobia, pain, discharge and redness.   Respiratory: Negative.  Negative for cough, hemoptysis, sputum production, shortness of breath, wheezing and stridor.    Cardiovascular: Negative.  Negative for chest pain, palpitations, orthopnea, claudication, leg swelling and PND.   Gastrointestinal: Negative.  Negative for heartburn, nausea, vomiting, abdominal pain, diarrhea, constipation, blood in stool and melena.   Genitourinary: Negative.  Negative for dysuria, urgency, frequency, incontinence, hematuria and flank pain.   Musculoskeletal: Negative.  Negative for myalgias, back pain, joint pain and falls.   Skin: Negative.  Negative for itching and rash.   Neurological: Negative.  Negative for dizziness, tingling, tremors, sensory change, speech change, focal weakness, seizures, loss of consciousness, weakness and headaches.    Endo/Heme/Allergies: Negative.  Negative for environmental allergies and polydipsia. Does not bruise/bleed easily.   Psychiatric/Behavioral: Negative.  Negative for depression, suicidal ideas, hallucinations, memory loss and substance abuse. The patient is not nervous/anxious and does not have insomnia.    All other systems reviewed and are negative.           Objective:     /60 (BP Location: Right arm, Patient Position: Sitting)   Pulse 78   Temp 36.7 °C (98 °F) (Temporal)   Ht 1.524 m (5')   Wt 55.8 kg (123 lb)   LMP  (LMP Unknown)   SpO2 98%   BMI 24.02 kg/m²      Physical Exam  Physical Exam   Vitals reviewed.  Constitutional: oriented to person, place, and time. appears well-developed and well-nourished. No distress.   HENT:  Head: Normocephalic and atraumatic. Right Ear: External ear normal. Left Ear: External ear normal. Nose: Nose normal. Mouth/Throat: Oropharynx is clear and moist. No oropharyngeal exudate.  gretel tm wnl.  Eyes: Right eye exhibits no discharge. Left eye exhibits no discharge. No scleral icterus.  Neck: No JVD present.  Cardiovascular: Normal rate, regular rhythm, normal heart sounds and intact distal pulses.  Exam reveals no gallop and no friction rub.  No murmur heard.  No carotid bruits.   Pulmonary/Chest: Effort normal and breath sounds normal. No stridor. No respiratory distress. no wheezes or rales. exhibits no tenderness.   Musculoskeletal: Normal range of motion. exhibits no edema. gretel pedal pulses 2+.  Lymphadenopathy: no cervical or supraclavicular adenopathy.   Abd:  No CVAT,  Soft,  Bs noted in all quadrants.  No HSM.  No abdominal tenderness.  Neurological: alert and oriented to person, place, and time. exhibits normal muscle tone. Coordination normal.   Skin: Skin is warm and dry. no diaphoresis.   Psychiatric: normal mood and affect. behavior is normal.   SUBJECTIVE: 69 y.o. female for annual routine pap and checkup.  Gyn History:   Last Pap: about 5 yrs.    Contraception: none  H/O Abnormal Pap none  H/O STI none  Current partner: no  Lmp:  2/1/2007  ROS:  No pelvic pain, vaginal discharge or dyspareunia.  Pelvic Exam - Sure Path Pap obtained and specimen sent to lab. Normal external genitalia with no lesions. Normal vaginal mucosa with normal rugation. Cervix has no visible lesions. No cervical motion tenderness. Uterus is normal sized with no masses. No adnexal tenderness or enlargement appreciated.         Assessment/Plan:       1. Smear, vaginal, as part of routine gynecological examination  THINPREP PAP WITH HPV    f/u with pt with test results and prn   2. Routine cervical smear  THINPREP PAP WITH HPV   3. RLS (restless legs syndrome)  ROPINIRole (REQUIP) 1 MG Tab    refilled requip at 1 mg bid prn

## 2020-05-13 LAB
CYTOLOGY REG CYTOL: NORMAL
HPV HR 12 DNA CVX QL NAA+PROBE: NEGATIVE
HPV16 DNA SPEC QL NAA+PROBE: NEGATIVE
HPV18 DNA SPEC QL NAA+PROBE: NEGATIVE
SPECIMEN SOURCE: NORMAL

## 2020-06-02 ENCOUNTER — OFFICE VISIT (OUTPATIENT)
Dept: MEDICAL GROUP | Facility: MEDICAL CENTER | Age: 70
End: 2020-06-02
Payer: MEDICARE

## 2020-06-02 VITALS
SYSTOLIC BLOOD PRESSURE: 122 MMHG | TEMPERATURE: 97.7 F | WEIGHT: 123 LBS | DIASTOLIC BLOOD PRESSURE: 60 MMHG | HEIGHT: 60 IN | OXYGEN SATURATION: 97 % | HEART RATE: 67 BPM | BODY MASS INDEX: 24.15 KG/M2

## 2020-06-02 DIAGNOSIS — G25.81 RLS (RESTLESS LEGS SYNDROME): ICD-10-CM

## 2020-06-02 PROCEDURE — 99214 OFFICE O/P EST MOD 30 MIN: CPT | Performed by: NURSE PRACTITIONER

## 2020-06-02 RX ORDER — GABAPENTIN 100 MG/1
100 CAPSULE ORAL 3 TIMES DAILY
Qty: 90 CAP | Refills: 1 | Status: SHIPPED | OUTPATIENT
Start: 2020-06-02 | End: 2020-07-01

## 2020-06-02 ASSESSMENT — FIBROSIS 4 INDEX: FIB4 SCORE: 4.5

## 2020-06-02 NOTE — PROGRESS NOTES
"  Subjective:     Tere Gallegos is a 69 y.o. female who presents with RLS.    HPI:   Seen in f/u for RLS.  She is having sx in her gretel legs both day and nite.  She is on requip.  It is not helping her sx at all.  She is taking bid on dialysis and nondialysis days.  She denies pain in legs but has the sensation of \"bones needing to move.\".  She will get up and move but the sensation comes back.    Information obtained via  machine    Patient Active Problem List    Diagnosis Date Noted   • Coronary artery disease involving native coronary artery without angina pectoris 05/04/2020   • Chronic diastolic heart failure (HCC) 05/04/2020   • ESRD (end stage renal disease) on dialysis ([facility]) 05/04/2020   • Acquired hypothyroidism 05/04/2020   • Dental disorder 05/04/2020   • QT prolongation 01/22/2020   • Hyperlipidemia 11/12/2019   • Transaminitis 12/22/2018   • RLS (restless legs syndrome) 08/05/2016   • Type 2 diabetes mellitus with hyperglycemia, without long-term current use of insulin ([facility]) 08/05/2016   • Kidney transplant candidate    • Essential hypertension 09/17/2013       Current medicines (including changes today)  Current Outpatient Medications   Medication Sig Dispense Refill   • gabapentin (NEURONTIN) 100 MG Cap Take 1 Cap by mouth 3 times a day. 90 Cap 1   • ROPINIRole (REQUIP) 1 MG Tab Take 1 Tab by mouth See Admin Instructions. take 1 tablet before dialysis.  Also before bed prn. 180 Tab 0   • Dulaglutide 0.75 MG/0.5ML Solution Pen-injector Inject 0.5 mL as instructed every 7 days. 4 PEN 1   • aspirin (ASA) 81 MG Chew Tab chewable tablet TOME FERNANDO TABLETA TODOS LOS MCCORMICK NOT COVERED OTC 90 Tab 3   • hydrOXYzine HCl (ATARAX) 25 MG Tab TOME FERNANDO TABLETA POR VIA ORAL AL ACOSTARSE CUANDO SEA NECESARIO FOR ITCHING 90 Tab 0   • hydrALAZINE (APRESOLINE) 100 MG tablet Take 1 Tab by mouth 2 times a day. 180 Tab 3   • levothyroxine (SYNTHROID) 50 MCG Tab Take 1 Tab by mouth Every morning on an " empty stomach. 30 Tab 6   • Dulaglutide (TRULICITY) 1.5 MG/0.5ML Solution Pen-injector Inject 1.5 mg as instructed every 7 days. On Tue 4 PEN 6   • atorvastatin (LIPITOR) 20 MG Tab Take 20 mg by mouth every evening.     • B Complex-C-Folic Acid (RENAL-SALVATORE) 0.8 MG Tab Take 1 Tab by mouth See Admin Instructions. Pt takes on Mon, Wed, and Fri before dialysis     • furosemide (LASIX) 40 MG Tab Take 1 Tab by mouth every day. Hold for SBP less than 100 90 Tab 3   • metoprolol (LOPRESSOR) 100 MG Tab Take 1 Tab by mouth 2 times a day. 180 Tab 3   • lisinopril (PRINIVIL) 20 MG Tab Take 2 Tabs by mouth 2 times a day. 180 Tab 3   • amLODIPine (NORVASC) 10 MG Tab Take 1 Tab by mouth every day. 90 Tab 3     No current facility-administered medications for this visit.        No Known Allergies    ROS  Constitutional: Negative. Negative for fever, chills, weight loss, malaise/fatigue and diaphoresis.   HENT: Negative. Negative for hearing loss, ear pain, nosebleeds, congestion, sore throat, neck pain, tinnitus and ear discharge.   Respiratory: Negative. Negative for cough, hemoptysis, sputum production, shortness of breath, wheezing and stridor.   Cardiovascular: Negative. Negative for chest pain, palpitations, orthopnea, claudication, leg swelling and PND.        Objective:     /60 (BP Location: Right arm, Patient Position: Sitting)   Pulse 67   Temp 36.5 °C (97.7 °F) (Temporal)   Ht 1.524 m (5')   Wt 55.8 kg (123 lb)   SpO2 97%  Body mass index is 24.02 kg/m².    Physical Exam:  Vitals reviewed.  Constitutional: Oriented to person, place, and time. appears well-developed and well-nourished. No distress.   Cardiovascular: Normal rate, regular rhythm, normal heart sounds and intact distal pulses. Exam reveals no gallop and no friction rub. No murmur heard. No carotid bruits.   Pulmonary/Chest: Effort normal and breath sounds normal. No stridor. No respiratory distress. no wheezes or rales. exhibits no tenderness.    Musculoskeletal: Normal range of motion. exhibits no edema. gretel pedal pulses 2+.  Lymphadenopathy: No cervical or supraclavicular adenopathy.   Neurological: Alert and oriented to person, place, and time. exhibits normal muscle tone.  Skin: Skin is warm and dry. No diaphoresis.   Psychiatric: Normal mood and affect. Behavior is normal.      Assessment and Plan:     The following treatment plan was discussed:    1. RLS (restless legs syndrome)  gabapentin (NEURONTIN) 100 MG Cap    dc requip since not helping sx.  try neurontin.  start 100 mg hs and wean up to tid if not too drowsy.  f/u 1 mo for sx eval         Followup: Return in about 4 weeks (around 6/30/2020).

## 2020-06-03 ENCOUNTER — APPOINTMENT (OUTPATIENT)
Dept: ENDOCRINOLOGY | Facility: MEDICAL CENTER | Age: 70
End: 2020-06-03
Payer: MEDICARE

## 2020-06-09 ENCOUNTER — OFFICE VISIT (OUTPATIENT)
Dept: CARDIOLOGY | Facility: MEDICAL CENTER | Age: 70
End: 2020-06-09
Payer: MEDICARE

## 2020-06-09 VITALS
SYSTOLIC BLOOD PRESSURE: 172 MMHG | HEART RATE: 64 BPM | HEIGHT: 60 IN | BODY MASS INDEX: 24.62 KG/M2 | DIASTOLIC BLOOD PRESSURE: 60 MMHG | OXYGEN SATURATION: 98 % | WEIGHT: 125.4 LBS

## 2020-06-09 DIAGNOSIS — Z95.5 PRESENCE OF DRUG-ELUTING STENT IN RIGHT CORONARY ARTERY: Chronic | ICD-10-CM

## 2020-06-09 DIAGNOSIS — I10 HYPERTENSION, UNSPECIFIED TYPE: ICD-10-CM

## 2020-06-09 DIAGNOSIS — I50.32 CHRONIC DIASTOLIC HEART FAILURE (HCC): ICD-10-CM

## 2020-06-09 PROCEDURE — 99214 OFFICE O/P EST MOD 30 MIN: CPT | Performed by: INTERNAL MEDICINE

## 2020-06-09 RX ORDER — HYDRALAZINE HYDROCHLORIDE 100 MG/1
100 TABLET, FILM COATED ORAL 3 TIMES DAILY
Qty: 270 TAB | Refills: 3 | Status: ON HOLD | OUTPATIENT
Start: 2020-06-09 | End: 2020-11-19

## 2020-06-09 RX ORDER — CARVEDILOL 25 MG/1
25 TABLET ORAL 2 TIMES DAILY WITH MEALS
Qty: 180 TAB | Refills: 3 | Status: SHIPPED | OUTPATIENT
Start: 2020-06-09 | End: 2021-06-30

## 2020-06-09 RX ORDER — LISINOPRIL 40 MG/1
40 TABLET ORAL 2 TIMES DAILY
Qty: 180 TAB | Refills: 3 | Status: ON HOLD | OUTPATIENT
Start: 2020-06-09 | End: 2020-11-19

## 2020-06-09 ASSESSMENT — ENCOUNTER SYMPTOMS
SHORTNESS OF BREATH: 0
CHILLS: 0
DIZZINESS: 0
CLAUDICATION: 0
SORE THROAT: 0
FOCAL WEAKNESS: 0
COUGH: 0
NAUSEA: 0
FEVER: 0
WEAKNESS: 0
FALLS: 0
ABDOMINAL PAIN: 0
BRUISES/BLEEDS EASILY: 0
PND: 0
PALPITATIONS: 0
BLURRED VISION: 0

## 2020-06-09 ASSESSMENT — FIBROSIS 4 INDEX: FIB4 SCORE: 4.5

## 2020-06-10 NOTE — PROGRESS NOTES
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 female who presents today for follow-up of her history of coronary disease status post stenting remotely in the setting of kidney transplant evaluation with her end-stage renal disease she has had refractory hypertension requiring hospitalizations    Been a while since I saw her she was in the heart failure program although her ejection fraction was normal    She still struggles with high blood pressures    Past Medical History:   Diagnosis Date   • Acquired hypothyroidism 5/4/2020   • CAD (coronary artery disease)    • Chronic diastolic heart failure (HCC) 5/4/2020   • Coronary artery disease due to lipid rich plaque     2 Synergy NOEMI to 100% RCA stent placed   • Dental disorder     partial dentures- uppers   • Diabetes ([facility])     oral medication   • ESRD (end stage renal disease) on dialysis ([facility]) 5/4/2020   • Hyperlipidemia    • Hypertension    • Kidney transplant candidate    • Presence of drug-eluting stent in right coronary artery    • QT prolongation 1/22/2020   • RLS (restless legs syndrome) 8/5/2016   • Transaminitis 12/22/2018     Past Surgical History:   Procedure Laterality Date   • Z CARDIAC CATH  9/7/2016    RCA stented with 2 Synergy drug-eluting stents.   • RECOVERY  8/16/2016    Procedure: CATH LAB [facility] WITH POSSIBLE DR. CASTILLO;  Surgeon: [facility];  [place] [facility] [place]  Service:    • Z CARDIAC CATH  8/16/16    100% RCA   • OTHER Left 2014    left arm upper extremity fistula   • OTHER ABDOMINAL SURGERY      left kidney removed due to cancer     Family History   Problem Relation Age of Onset   • Diabetes Sister    • Other Sister         liver disease   • Diabetes Brother    • Heart Disease Neg Hx      Social History     Socioeconomic History   • Marital status:      Spouse name: Not on file    • Number of children: Not on file   • Years of education: Not on file   • Highest education level: Not on file   Occupational History   • Not on file   Social Needs   • Financial resource strain: Not on file   • Food insecurity     Worry: Not on file     Inability: Not on file   • Transportation needs     Medical: Not on file     Non-medical: Not on file   Tobacco Use   • Smoking status: Never Smoker   • Smokeless tobacco: Never Used   Substance and Sexual Activity   • Alcohol use: No     Alcohol/week: 0.0 oz   • Drug use: No   • Sexual activity: Never   Lifestyle   • Physical activity     Days per week: Not on file     Minutes per session: Not on file   • Stress: Not on file   Relationships   • Social connections     Talks on phone: Not on file     Gets together: Not on file     Attends Taoism service: Not on file     Active member of club or organization: Not on file     Attends meetings of clubs or organizations: Not on file     Relationship status: Not on file   • Intimate partner violence     Fear of current or ex partner: Not on file     Emotionally abused: Not on file     Physically abused: Not on file     Forced sexual activity: Not on file   Other Topics Concern   • Not on file   Social History Narrative   • Not on file     No Known Allergies  Outpatient Encounter Medications as of 6/9/2020   Medication Sig Dispense Refill   • lisinopril (PRINIVIL) 40 MG tablet Take 1 Tab by mouth 2 times a day. 180 Tab 3   • hydrALAZINE (APRESOLINE) 100 MG tablet Take 1 Tab by mouth 3 times a day. 270 Tab 3   • carvedilol (COREG) 25 MG Tab Take 1 Tab by mouth 2 times a day, with meals. 180 Tab 3   • gabapentin (NEURONTIN) 100 MG Cap Take 1 Cap by mouth 3 times a day. 90 Cap 1   • ROPINIRole (REQUIP) 1 MG Tab Take 1 Tab by mouth See Admin Instructions. take 1 tablet before dialysis.  Also before bed prn. 180 Tab 0   • Dulaglutide 0.75 MG/0.5ML Solution Pen-injector Inject 0.5 mL as instructed every 7 days. 4 PEN 1   •  aspirin (ASA) 81 MG Chew Tab chewable tablet TOME FERNANDO TABLETA TODOS LOS MCCORMICK NOT COVERED OTC 90 Tab 3   • hydrOXYzine HCl (ATARAX) 25 MG Tab TOME FERNANDO TABLETA POR VIA ORAL AL ACOSTARSE CUANDO SEA NECESARIO FOR ITCHING 90 Tab 0   • levothyroxine (SYNTHROID) 50 MCG Tab Take 1 Tab by mouth Every morning on an empty stomach. 30 Tab 6   • Dulaglutide (TRULICITY) 1.5 MG/0.5ML Solution Pen-injector Inject 1.5 mg as instructed every 7 days. On Tue 4 PEN 6   • atorvastatin (LIPITOR) 20 MG Tab Take 20 mg by mouth every evening.     • B Complex-C-Folic Acid (RENAL-SALVATORE) 0.8 MG Tab Take 1 Tab by mouth See Admin Instructions. Pt takes on Mon, Wed, and Fri before dialysis     • furosemide (LASIX) 40 MG Tab Take 1 Tab by mouth every day. Hold for SBP less than 100 90 Tab 3   • amLODIPine (NORVASC) 10 MG Tab Take 1 Tab by mouth every day. 90 Tab 3   • [DISCONTINUED] hydrALAZINE (APRESOLINE) 100 MG tablet Take 1 Tab by mouth 2 times a day. 180 Tab 3   • [DISCONTINUED] metoprolol (LOPRESSOR) 100 MG Tab Take 1 Tab by mouth 2 times a day. 180 Tab 3   • [DISCONTINUED] lisinopril (PRINIVIL) 20 MG Tab Take 2 Tabs by mouth 2 times a day. 180 Tab 3     No facility-administered encounter medications on file as of 6/9/2020.      Review of Systems   Constitutional: Negative for chills and fever.   HENT: Negative for sore throat.    Eyes: Negative for blurred vision.   Respiratory: Negative for cough and shortness of breath.    Cardiovascular: Negative for chest pain, palpitations, claudication, leg swelling and PND.   Gastrointestinal: Negative for abdominal pain and nausea.   Musculoskeletal: Negative for falls and joint pain.   Skin: Negative for rash.   Neurological: Negative for dizziness, focal weakness and weakness.   Endo/Heme/Allergies: Does not bruise/bleed easily.        Objective:   BP (!) 172/60 (BP Location: Right arm, Patient Position: Sitting, BP Cuff Size: Adult)   Pulse 64   Ht 1.524 m (5')   Wt 56.9 kg (125 lb 6.4 oz)    LMP  (LMP Unknown)   SpO2 98%   BMI 24.49 kg/m²     Physical Exam   Constitutional: No distress.   HENT:   Mouth/Throat: Oropharynx is clear and moist. No oropharyngeal exudate.   Eyes: No scleral icterus.   Neck: No JVD present.   Cardiovascular: Normal rate and normal heart sounds. Exam reveals no gallop and no friction rub.   No murmur heard.  Pulmonary/Chest: No respiratory distress. She has no wheezes. She has no rales.   Abdominal: Soft. Bowel sounds are normal.   Musculoskeletal:         General: No edema.   Neurological: She is alert.   Skin: No rash noted. She is not diaphoretic.   Psychiatric: She has a normal mood and affect.     We reviewed in person the most recent labs  Recent Results (from the past 4032 hour(s))   EKG    Collection Time: 20 12:53 PM   Result Value Ref Range    Report       [facility]    Test Date:  2020  Pt Name:    DIOR NICHOLS    Department: [facility]  MRN:        [id number]                      Room:  Gender:     Female                       Technician: [facility]  :        [id number]                   Requested By:ER TRIAGE PROTOCOL  Order #:    [id number]                    Reading MD: AKBAR MURDOCK MD    Measurements  Intervals                                Axis  Rate:       73                           P:          -13  MT:         176                          QRS:        74  QRSD:       90                           T:          37  QT:         471  QTc:        519    Interpretive Statements  Sinus rhythm  Probable LVH with secondary repol abnrm  Prolonged QT interval  Compared to ECG 2018 19:05:31  Prolonged QT interval now present  Electronically Signed On 2020 13:25:41 PST by AKBAR MURDOCK MD     CBC with Differential    Collection Time: 20  1:56 PM   Result Value Ref Range    WBC 3.1 (L) 4.8 - 10.8 K/uL    RBC 2.71 (L) 4.20 - 5.40 M/uL    Hemoglobin 8.1 (L) 12.0 - 16.0 g/dL    Hematocrit 23.6 (L)  37.0 - 47.0 %    MCV 87.1 81.4 - 97.8 fL    MCH 29.9 27.0 - 33.0 pg    MCHC 34.3 33.6 - 35.0 g/dL    RDW 42.0 35.9 - 50.0 fL    Platelet Count 127 (L) 164 - 446 K/uL    MPV 10.2 9.0 - 12.9 fL    Neutrophils-Polys 70.70 44.00 - 72.00 %    Lymphocytes 14.20 (L) 22.00 - 41.00 %    Monocytes 11.30 0.00 - 13.40 %    Eosinophils 2.60 0.00 - 6.90 %    Basophils 0.60 0.00 - 1.80 %    Immature Granulocytes 0.60 0.00 - 0.90 %    Nucleated RBC 0.00 /100 WBC    Neutrophils (Absolute) 2.18 2.00 - 7.15 K/uL    Lymphs (Absolute) 0.44 (L) 1.00 - 4.80 K/uL    Monos (Absolute) 0.35 0.00 - 0.85 K/uL    Eos (Absolute) 0.08 0.00 - 0.51 K/uL    Baso (Absolute) 0.02 0.00 - 0.12 K/uL    Immature Granulocytes (abs) 0.02 0.00 - 0.11 K/uL    NRBC (Absolute) 0.00 K/uL   Complete Metabolic Panel (CMP)    Collection Time: 01/22/20  1:56 PM   Result Value Ref Range    Sodium 134 (L) 135 - 145 mmol/L    Potassium 4.0 3.6 - 5.5 mmol/L    Chloride 93 (L) 96 - 112 mmol/L    Co2 28 20 - 33 mmol/L    Anion Gap 13.0 (H) 0.0 - 11.9    Glucose 99 65 - 99 mg/dL    Bun 12 8 - 22 mg/dL    Creatinine 3.76 (H) 0.50 - 1.40 mg/dL    Calcium 9.1 8.4 - 10.2 mg/dL    AST(SGOT) 22 12 - 45 U/L    ALT(SGPT) 12 2 - 50 U/L    Alkaline Phosphatase 79 30 - 99 U/L    Total Bilirubin 0.6 0.1 - 1.5 mg/dL    Albumin 3.9 3.2 - 4.9 g/dL    Total Protein 6.6 6.0 - 8.2 g/dL    Globulin 2.7 1.9 - 3.5 g/dL    A-G Ratio 1.4 g/dL   Troponin    Collection Time: 01/22/20  1:56 PM   Result Value Ref Range    Troponin T 56 (H) 6 - 19 ng/L   ESTIMATED GFR    Collection Time: 01/22/20  1:56 PM   Result Value Ref Range    GFR If  14 (A) >60 mL/min/1.73 m 2    GFR If Non  12 (A) >60 mL/min/1.73 m 2   HEPATITIS PANEL ACUTE(4 COMPONENTS)    Collection Time: 01/22/20  1:56 PM   Result Value Ref Range    Hepatitis B Surface Antigen Negative Negative    Hepatitis B Cors Ab,IgM Negative Negative    Hepatitis A Virus Ab, IgM Negative Negative    Hepatitis C Antibody  Negative Negative   HEP B SURFACE AB    Collection Time: 01/22/20  1:56 PM   Result Value Ref Range    Hep B Surface Antibody Quant 22.38 (H) 0.00 - 10.00 mIU/mL   TROPONIN    Collection Time: 01/22/20  5:05 PM   Result Value Ref Range    Troponin T 54 (H) 6 - 19 ng/L   CBC with Differential    Collection Time: 01/23/20  4:42 AM   Result Value Ref Range    WBC 3.1 (L) 4.8 - 10.8 K/uL    RBC 2.72 (L) 4.20 - 5.40 M/uL    Hemoglobin 8.2 (L) 12.0 - 16.0 g/dL    Hematocrit 23.8 (L) 37.0 - 47.0 %    MCV 87.5 81.4 - 97.8 fL    MCH 30.1 27.0 - 33.0 pg    MCHC 34.5 33.6 - 35.0 g/dL    RDW 42.1 35.9 - 50.0 fL    Platelet Count 132 (L) 164 - 446 K/uL    MPV 11.2 9.0 - 12.9 fL    Neutrophils-Polys 70.50 44.00 - 72.00 %    Lymphocytes 16.20 (L) 22.00 - 41.00 %    Monocytes 10.40 0.00 - 13.40 %    Eosinophils 2.30 0.00 - 6.90 %    Basophils 0.60 0.00 - 1.80 %    Immature Granulocytes 0.00 0.00 - 0.90 %    Nucleated RBC 0.00 /100 WBC    Neutrophils (Absolute) 2.18 2.00 - 7.15 K/uL    Lymphs (Absolute) 0.50 (L) 1.00 - 4.80 K/uL    Monos (Absolute) 0.32 0.00 - 0.85 K/uL    Eos (Absolute) 0.07 0.00 - 0.51 K/uL    Baso (Absolute) 0.02 0.00 - 0.12 K/uL    Immature Granulocytes (abs) 0.00 0.00 - 0.11 K/uL    NRBC (Absolute) 0.00 K/uL   Basic Metabolic Panel (BMP)    Collection Time: 01/23/20  4:42 AM   Result Value Ref Range    Sodium 136 135 - 145 mmol/L    Potassium 4.1 3.6 - 5.5 mmol/L    Chloride 96 96 - 112 mmol/L    Co2 31 20 - 33 mmol/L    Glucose 98 65 - 99 mg/dL    Bun 10 8 - 22 mg/dL    Creatinine 3.00 (H) 0.50 - 1.40 mg/dL    Calcium 9.9 8.5 - 10.5 mg/dL    Anion Gap 9.0 0.0 - 11.9   ESTIMATED GFR    Collection Time: 01/23/20  4:42 AM   Result Value Ref Range    GFR If  19 (A) >60 mL/min/1.73 m 2    GFR If Non African American 15 (A) >60 mL/min/1.73 m 2   EC-ECHOCARDIOGRAM COMPLETE W/O CONT    Collection Time: 01/23/20  8:49 AM   Result Value Ref Range    Eject.Frac. MOD BP 57.32     Eject.Frac. MOD 4C 56.09      Eject.Frac. MOD 2C 54.74     Left Ventrical Ejection Fraction 60    POCT Hemoglobin A1C    Collection Time: 02/19/20  3:53 PM   Result Value Ref Range    Glycohemoglobin 5.6 0.0 - 5.6 %    Internal Control Negative Valid     Internal Control Positive Valid    FREE THYROXINE    Collection Time: 02/19/20  4:41 PM   Result Value Ref Range    Free T-4 1.33 0.53 - 1.43 ng/dL   Comp Metabolic Panel    Collection Time: 02/19/20  4:41 PM   Result Value Ref Range    Sodium 137 135 - 145 mmol/L    Potassium 4.4 3.6 - 5.5 mmol/L    Chloride 94 (L) 96 - 112 mmol/L    Co2 33 20 - 33 mmol/L    Anion Gap 10.0 0.0 - 11.9    Glucose 106 (H) 65 - 99 mg/dL    Bun 10 8 - 22 mg/dL    Creatinine 4.55 (H) 0.50 - 1.40 mg/dL    Calcium 10.1 8.5 - 10.5 mg/dL    AST(SGOT) 17 12 - 45 U/L    ALT(SGPT) 11 2 - 50 U/L    Alkaline Phosphatase 72 30 - 99 U/L    Total Bilirubin 0.5 0.1 - 1.5 mg/dL    Albumin 4.5 3.2 - 4.9 g/dL    Total Protein 7.4 6.0 - 8.2 g/dL    Globulin 2.9 1.9 - 3.5 g/dL    A-G Ratio 1.6 g/dL   TSH    Collection Time: 02/19/20  4:41 PM   Result Value Ref Range    TSH 2.010 0.380 - 5.330 uIU/mL   ESTIMATED GFR    Collection Time: 02/19/20  4:41 PM   Result Value Ref Range    GFR If  12 (A) >60 mL/min/1.73 m 2    GFR If Non African American 10 (A) >60 mL/min/1.73 m 2   HEMOGLOBIN A1C    Collection Time: 04/27/20 10:04 AM   Result Value Ref Range    Glycohemoglobin 5.3 0.0 - 5.6 %    Est Avg Glucose 105 mg/dL   Lipid Profile    Collection Time: 04/27/20 10:04 AM   Result Value Ref Range    Cholesterol,Tot 104 100 - 199 mg/dL    Triglycerides 99 0 - 149 mg/dL    HDL 47 >=40 mg/dL    LDL 37 <100 mg/dL   TSH    Collection Time: 04/27/20 10:04 AM   Result Value Ref Range    TSH 2.240 0.380 - 5.330 uIU/mL   FREE THYROXINE    Collection Time: 04/27/20 10:04 AM   Result Value Ref Range    Free T-4 1.84 (H) 0.58 - 1.64 ng/dL   Comp Metabolic Panel    Collection Time: 04/27/20 10:04 AM   Result Value Ref Range    Sodium  139 135 - 145 mmol/L    Potassium 3.8 3.6 - 5.5 mmol/L    Chloride 95 (L) 96 - 112 mmol/L    Co2 31 20 - 33 mmol/L    Anion Gap 13.0 7.0 - 16.0    Glucose 88 65 - 99 mg/dL    Bun 10 8 - 22 mg/dL    Creatinine 3.27 (H) 0.50 - 1.40 mg/dL    Calcium 9.6 8.4 - 10.2 mg/dL    AST(SGOT) 73 (H) 12 - 45 U/L    ALT(SGPT) 74 (H) 2 - 50 U/L    Alkaline Phosphatase 81 30 - 99 U/L    Total Bilirubin 0.5 0.1 - 1.5 mg/dL    Albumin 4.4 3.2 - 4.9 g/dL    Total Protein 7.2 6.0 - 8.2 g/dL    Globulin 2.8 1.9 - 3.5 g/dL    A-G Ratio 1.6 g/dL   FASTING STATUS    Collection Time: 04/27/20 10:04 AM   Result Value Ref Range    Fasting Status Fasting    ESTIMATED GFR    Collection Time: 04/27/20 10:04 AM   Result Value Ref Range    GFR If  17 (A) >60 mL/min/1.73 m 2    GFR If Non  14 (A) >60 mL/min/1.73 m 2   MICROALBUMIN CREAT RATIO URINE    Collection Time: 04/29/20  3:00 PM   Result Value Ref Range    Creatinine, Urine 13.36 mg/dL    Microalbumin, Urine Random 107.9 mg/dL    Micro Alb Creat Ratio 8076 (H) 0 - 30 mg/g   THINPREP PAP WITH HPV    Collection Time: 05/11/20  4:51 PM   Result Value Ref Range    Cytology Reg See Path Report     Source Cervical     HPV Genotype 16 Negative Negative    HPV Genotype 18 Negative Negative    HPV Other High Risk Genotypes Negative Negative       Assessment:     1. Chronic diastolic heart failure (HCC)     2. Presence of drug-eluting stent in right coronary artery     3. Hypertension, unspecified type  lisinopril (PRINIVIL) 40 MG tablet       Medical Decision Making:  Today's Assessment / Status / Plan:     It was my pleasure to meet with Ms. Kenyetta Gallegos.    Blood pressure is not well controlled.  We specifically assessed the labs on hypertension treatment  We will switch her from metoprolol to carvedilol and increased her hydralazine to 3 times daily encouraged to make sure that her medicines comply she did bring her medication bottles and list was accurate    She  is on appropriate statin.    I will see Ms. Kenyetta Gallegos back in 6 months time and encouraged her to follow up with us over the phone or electronically using my MyChart as issues arise.    It is my pleasure to participate in the care of Ms. Kenyetta Gallegos.  Please do not hesitate to contact me with questions or concerns.    Ritesh Borjas MD PhD [facility]  Cardiologist [facility]    Please note that this dictation was created using voice recognition software. There may be errors I did not discover before finalizing the note.

## 2020-06-27 DIAGNOSIS — G25.81 RLS (RESTLESS LEGS SYNDROME): ICD-10-CM

## 2020-07-01 RX ORDER — GABAPENTIN 100 MG/1
CAPSULE ORAL
Qty: 90 CAP | Refills: 1 | Status: SHIPPED | OUTPATIENT
Start: 2020-07-01 | End: 2020-08-03

## 2020-07-07 ENCOUNTER — OFFICE VISIT (OUTPATIENT)
Dept: ENDOCRINOLOGY | Facility: MEDICAL CENTER | Age: 70
End: 2020-07-07
Attending: INTERNAL MEDICINE
Payer: MEDICARE

## 2020-07-07 VITALS
HEIGHT: 60 IN | WEIGHT: 123 LBS | SYSTOLIC BLOOD PRESSURE: 138 MMHG | DIASTOLIC BLOOD PRESSURE: 70 MMHG | OXYGEN SATURATION: 99 % | HEART RATE: 69 BPM | BODY MASS INDEX: 24.15 KG/M2

## 2020-07-07 DIAGNOSIS — E11.22 CONTROLLED TYPE 2 DIABETES MELLITUS WITH CHRONIC KIDNEY DISEASE ON CHRONIC DIALYSIS, WITHOUT LONG-TERM CURRENT USE OF INSULIN (HCC): ICD-10-CM

## 2020-07-07 DIAGNOSIS — I10 ESSENTIAL HYPERTENSION: ICD-10-CM

## 2020-07-07 DIAGNOSIS — E03.8 SUBCLINICAL HYPOTHYROIDISM: ICD-10-CM

## 2020-07-07 DIAGNOSIS — N18.6 CONTROLLED TYPE 2 DIABETES MELLITUS WITH CHRONIC KIDNEY DISEASE ON CHRONIC DIALYSIS, WITHOUT LONG-TERM CURRENT USE OF INSULIN (HCC): ICD-10-CM

## 2020-07-07 DIAGNOSIS — E78.2 MIXED HYPERLIPIDEMIA: ICD-10-CM

## 2020-07-07 DIAGNOSIS — Z79.84 LONG TERM (CURRENT) USE OF ORAL HYPOGLYCEMIC DRUGS: ICD-10-CM

## 2020-07-07 DIAGNOSIS — Z99.2 CONTROLLED TYPE 2 DIABETES MELLITUS WITH CHRONIC KIDNEY DISEASE ON CHRONIC DIALYSIS, WITHOUT LONG-TERM CURRENT USE OF INSULIN (HCC): ICD-10-CM

## 2020-07-07 PROCEDURE — 99214 OFFICE O/P EST MOD 30 MIN: CPT | Performed by: INTERNAL MEDICINE

## 2020-07-07 PROCEDURE — 99211 OFF/OP EST MAY X REQ PHY/QHP: CPT | Performed by: INTERNAL MEDICINE

## 2020-07-07 RX ORDER — PIOGLITAZONEHYDROCHLORIDE 30 MG/1
30 TABLET ORAL DAILY
Qty: 90 TAB | Refills: 2 | Status: SHIPPED | OUTPATIENT
Start: 2020-07-07 | End: 2020-10-05

## 2020-07-07 RX ORDER — LEVOTHYROXINE SODIUM 0.05 MG/1
50 TABLET ORAL
Qty: 30 TAB | Refills: 6 | Status: SHIPPED | OUTPATIENT
Start: 2020-07-07 | End: 2020-10-07 | Stop reason: SDUPTHER

## 2020-07-07 ASSESSMENT — FIBROSIS 4 INDEX: FIB4 SCORE: 4.5

## 2020-07-07 NOTE — PROGRESS NOTES
CHIEF COMPLAINT: Patient is here for follow up of Type 2 Diabetes Mellitus    HPI:     Tere Gallegos is a 68 y.o. female with Type 2 Diabetes Mellitus here for follow up.     Labs from February 19, 2020 shows her A1c has improved to 5.6%.  Previously her A1c was elevated at 8.7%.    I initially saw her as a consult from Kathy CHAPA for poorly controlled diabetes at baseline while not taking any medications.  She has a history of end-stage renal disease and is on hemodialysis and also has a history of subclinical hypothyroidism and hyperlipidemia.   After her initial consultation visit I started her on Trulicity       On follow-up she continues to stay on Trulicity 1.5 mg weekly for her diabetes.     She reports that she does not want to take Trulicity more because it is reduced her appetite significantly she wants to transition to oral medications for diabetes management      She also had uncontrolled subclinical hypothyroidism at baseline with suboptimal TSH of 3.2 in April.  She finally picked up the new prescription of levothyroxine 50 MCG daily and she reports good compliance.    Her last TSH was better at 2.2 on April 27, 2020      She has hyperlipidemia that is fairly controlled and is taking atorvastatin and she is tolerating this medication well.   Her last LDL cholesterol was well controlled at 37 with triglycerides of 99 on April 27, 2020      She also has essential hypertension and is taking metoprolol, lisinopril and amlodipine and blood pressure is well controlled and she is tolerating her medications well      BG Diary:   Patient did not bring her glucose meter    Weight has been stable    Diabetes Complications   Retinopathy: No known retinopathy.  Last eye exam: I have referred her for a dilated eye exam to ophthalmology  Neuropathy: Denies paresthesias or numbness in hands or feet. Denies any foot wounds.  Exercise: Minimal.  Diet: Fair.  Patient's medications, allergies, and  social histories were reviewed and updated as appropriate.    ROS:     CONS:     No fever, no chills   EYES:     No diplopia, no blurry vision   CV:           No chest pain, no palpitations   PULM:     No SOB, no cough, no hemoptysis.   GI:            No nausea, no vomiting, no diarrhea, no constipation   ENDO:     No polyuria, no polydipsia, no heat intolerance, no cold intolerance       Past Medical History:  Problem List:  2020-07: Subclinical hypothyroidism  2020-07: Long term (current) use of oral hypoglycemic drugs  2020-05: Chronic diastolic heart failure (HCC)  2020-05: ESRD (end stage renal disease) on dialysis ([facility])  2020-05: Acquired hypothyroidism  2020-05: Dental disorder  2020-01: QT prolongation  2019-11: Hyperlipidemia  2018-12: Transaminitis  2016-09: Encounter for cervical Pap smear with pelvic exam  2016-09: Encounter to establish care with new doctor  2016-08: RLS (restless legs syndrome)  2016-08: Controlled type 2 diabetes mellitus with chronic kidney   disease on chronic dialysis, without long-term current use of insulin   ([facility])  2014-01: Hyperkalemia  2013-09: Essential hypertension  Kidney transplant candidate  Coronary artery disease due to lipid rich plaque  Presence of drug-eluting stent in right coronary artery      Past Surgical History:  Past Surgical History:   Procedure Laterality Date   • Z CARDIAC CATH  9/7/2016    RCA stented with 2 Synergy drug-eluting stents.   • RECOVERY  8/16/2016    Procedure: CATH LAB [facility] WITH POSSIBLE DR. CASTILLO;  Surgeon: Recoveryonly Surgery;  [place] [facility]  Service:    • Z CARDIAC CATH  8/16/16    100% RCA   • OTHER Left 2014    left arm upper extremity fistula   • OTHER ABDOMINAL SURGERY      left kidney removed due to cancer        Allergies:  Patient has no known allergies.     Social History:  Social History     Tobacco Use   • Smoking status: Never Smoker   • Smokeless tobacco: Never Used   Substance Use Topics    • Alcohol use: No     Alcohol/week: 0.0 oz   • Drug use: No        Family History:   family history includes Diabetes in her brother and sister; Other in her sister.      PHYSICAL EXAM:   OBJECTIVE:  Vital signs: /70 (BP Location: Left arm, Patient Position: Sitting, BP Cuff Size: Adult)   Pulse 69   Ht 1.524 m (5')   Wt 55.8 kg (123 lb)   LMP  (LMP Unknown)   SpO2 99%   BMI 24.02 kg/m²   GENERAL: Well-developed, well-nourished in no apparent distress.   EYE:  No ocular asymmetry, PERRLA  HENT: Pink, moist mucous membranes.    NECK: No thyromegaly.   CARDIOVASCULAR:  No murmurs  LUNGS: Clear breath sounds  ABDOMEN: Soft, nontender   EXTREMITIES: No clubbing, cyanosis, or edema.   NEUROLOGICAL: No gross focal motor abnormalities   LYMPH: No cervical adenopathy palpated.   SKIN: No rashes, lesions.   Monofilament testing with a 10 gram force: sensation: intact bilaterally  Visual Inspection: Feet without maceration, ulcers, or fissures.  Pedal pulses: intact bilaterally    Labs:  Lab Results   Component Value Date/Time    HBA1C 5.3 04/27/2020 10:04 AM        Lab Results   Component Value Date/Time    WBC 3.1 (L) 01/23/2020 04:42 AM    RBC 2.72 (L) 01/23/2020 04:42 AM    HEMOGLOBIN 8.2 (L) 01/23/2020 04:42 AM    MCV 87.5 01/23/2020 04:42 AM    MCH 30.1 01/23/2020 04:42 AM    MCHC 34.5 01/23/2020 04:42 AM    RDW 42.1 01/23/2020 04:42 AM    MPV 11.2 01/23/2020 04:42 AM       Lab Results   Component Value Date/Time    SODIUM 139 04/27/2020 10:04 AM    POTASSIUM 3.8 04/27/2020 10:04 AM    CHLORIDE 95 (L) 04/27/2020 10:04 AM    CO2 31 04/27/2020 10:04 AM    ANION 13.0 04/27/2020 10:04 AM    GLUCOSE 88 04/27/2020 10:04 AM    BUN 10 04/27/2020 10:04 AM    CREATININE 3.27 (H) 04/27/2020 10:04 AM    CALCIUM 9.6 04/27/2020 10:04 AM    ASTSGOT 73 (H) 04/27/2020 10:04 AM    ALTSGPT 74 (H) 04/27/2020 10:04 AM    TBILIRUBIN 0.5 04/27/2020 10:04 AM    ALBUMIN 4.4 04/27/2020 10:04 AM    TOTPROTEIN 7.2 04/27/2020 10:04 AM     GLOBULIN 2.8 04/27/2020 10:04 AM    AGRATIO 1.6 04/27/2020 10:04 AM       Lab Results   Component Value Date/Time    CHOLSTRLTOT 91 (L) 12/23/2018 0101    TRIGLYCERIDE 139 12/23/2018 0101    HDL 30 (A) 12/23/2018 0101    LDL 33 12/23/2018 0101       Lab Results   Component Value Date/Time    MALBCRT 8076 (H) 04/29/2020 03:00 PM    MICROALBUR 107.9 04/29/2020 03:00 PM        Lab Results   Component Value Date/Time    TSHULTRASEN 3.220 11/12/2019 1247     No results found for: FREEDIR  No results found for: FREET3  No results found for: THYSTIMIG        ASSESSMENT/PLAN:     1. Controlled type 2 diabetes mellitus with chronic kidney disease on chronic dialysis, without long-term current use of insulin (HCC)  Well-controlled  Based on patient preference I am discontinuing Trulicity and I am starting her on Actos 30 mg daily  I reviewed her cardiology records and she does not have congestive heart failure so Actos is safe for her  I reviewed the side effects of Actos  Recommend that she watch her carb intake  She needs to complete the eye exam and I had already referred her to ophthalmology in the past  We will plan for follow-up in 3 months      2. Subclinical hypothyroidism  Well-controlled  Continue levothyroxine 50 MCG daily  Reviewed the importance of adherence to thyroid hormone replacement therapy.  I will plan to repeat her TSH again after 6 to 12 months      3. Mixed hyperlipidemia  Well-controlled, continue atorvastatin   follow low-fat diet  Recommend repeating fasting lipids in 12 months      4. Essential hypertension  Well-controlled continue current medications      5. Long term (current) use of oral hypoglycemic drugs  I am discontinuing her GLP-1 analog and I am placing her on oral agents for type 2 diabetes management per patient preference      Return in about 3 months (around 10/7/2020).      Thank you kindly for allowing me to participate in the diabetes care plan for this patient.    Jimmy  Wolf SAEED, MAYELA, [facility]  12/05/19    CC:   ANGELITA Concepcion

## 2020-07-29 ENCOUNTER — OFFICE VISIT (OUTPATIENT)
Dept: MEDICAL GROUP | Facility: MEDICAL CENTER | Age: 70
End: 2020-07-29
Payer: MEDICARE

## 2020-07-29 VITALS
HEIGHT: 60 IN | HEART RATE: 59 BPM | OXYGEN SATURATION: 97 % | WEIGHT: 122 LBS | BODY MASS INDEX: 23.95 KG/M2 | DIASTOLIC BLOOD PRESSURE: 50 MMHG | SYSTOLIC BLOOD PRESSURE: 116 MMHG | TEMPERATURE: 97.9 F

## 2020-07-29 DIAGNOSIS — L29.9 ITCHING: ICD-10-CM

## 2020-07-29 PROCEDURE — 99214 OFFICE O/P EST MOD 30 MIN: CPT | Performed by: NURSE PRACTITIONER

## 2020-07-29 RX ORDER — HYDROXYZINE HYDROCHLORIDE 25 MG/1
TABLET, FILM COATED ORAL
Qty: 90 TAB | Refills: 0 | Status: SHIPPED | OUTPATIENT
Start: 2020-07-29 | End: 2021-01-25 | Stop reason: SDUPTHER

## 2020-07-29 ASSESSMENT — FIBROSIS 4 INDEX: FIB4 SCORE: 4.5

## 2020-07-29 NOTE — PROGRESS NOTES
Subjective:     Tere Gallegos is a 69 y.o. female who presents with itching.    HPI:   Seen in f/u for itching.  Sx started 3 days ago.  Itching all over.  She is on dialysis.  It is worse with dialysis.  she has had the itching in the past.  Another provider ordered her a pill that helped.  She also has dry skin.  Dialysis makes it  Drier.   Pt is non-english speaking.  Used machine .     Patient Active Problem List    Diagnosis Date Noted   • Subclinical hypothyroidism 07/07/2020   • Long term (current) use of oral hypoglycemic drugs 07/07/2020   • Presence of drug-eluting stent in right coronary artery    • Chronic diastolic heart failure (HCC) 05/04/2020   • ESRD (end stage renal disease) on dialysis ([facility]) 05/04/2020   • Acquired hypothyroidism 05/04/2020   • Dental disorder 05/04/2020   • Coronary artery disease due to lipid rich plaque    • QT prolongation 01/22/2020   • Hyperlipidemia 11/12/2019   • Transaminitis 12/22/2018   • RLS (restless legs syndrome) 08/05/2016   • Controlled type 2 diabetes mellitus with chronic kidney disease on chronic dialysis, without long-term current use of insulin ([facility]) 08/05/2016   • Kidney transplant candidate    • Essential hypertension 09/17/2013       Current medicines (including changes today)  Current Outpatient Medications   Medication Sig Dispense Refill   • hydrOXYzine HCl (ATARAX) 25 MG Tab TOME FERNANDO TABLETA POR VIA ORAL AL ACOSTARSE CUANDO SEA NECESARIO FOR ITCHING 90 Tab 0   • pioglitazone (ACTOS) 30 MG Tab Take 1 Tab by mouth every day for 90 days. 90 Tab 2   • levothyroxine (SYNTHROID) 50 MCG Tab Take 1 Tab by mouth Every morning on an empty stomach. 30 Tab 6   • gabapentin (NEURONTIN) 100 MG Cap TAKE 1 CAP BY MOUTH DAILY FOR 5 DAYS, 1 CAP TWICE DAILY X 5 DAYS, THEN 1 CAP 3X DAILY IF NOT DROWSY 90 Cap 1   • lisinopril (PRINIVIL) 40 MG tablet Take 1 Tab by mouth 2 times a day. 180 Tab 3   • hydrALAZINE (APRESOLINE) 100 MG tablet Take 1 Tab  by mouth 3 times a day. 270 Tab 3   • carvedilol (COREG) 25 MG Tab Take 1 Tab by mouth 2 times a day, with meals. 180 Tab 3   • ROPINIRole (REQUIP) 1 MG Tab Take 1 Tab by mouth See Admin Instructions. take 1 tablet before dialysis.  Also before bed prn. 180 Tab 0   • aspirin (ASA) 81 MG Chew Tab chewable tablet TOME FERNANDO TABLETA TODOS LOS MCCORMICK NOT COVERED OTC 90 Tab 3   • atorvastatin (LIPITOR) 20 MG Tab Take 20 mg by mouth every evening.     • B Complex-C-Folic Acid (RENAL-SALVATORE) 0.8 MG Tab Take 1 Tab by mouth See Admin Instructions. Pt takes on Mon, Wed, and Fri before dialysis     • furosemide (LASIX) 40 MG Tab Take 1 Tab by mouth every day. Hold for SBP less than 100 90 Tab 3   • amLODIPine (NORVASC) 10 MG Tab Take 1 Tab by mouth every day. 90 Tab 3     No current facility-administered medications for this visit.        No Known Allergies    ROS  Constitutional: Negative. Respiratory: Negative. Negative for cough, hemoptysis, sputum production, shortness of breath, wheezing and stridor.   Cardiovascular: Negative. Negative for chest pain, palpitations, orthopnea, claudication, leg swelling and PND.        Objective:     /50 (BP Location: Right arm, Patient Position: Sitting)   Pulse (!) 59   Temp 36.6 °C (97.9 °F) (Temporal)   Ht 1.524 m (5')   Wt 55.3 kg (122 lb)   SpO2 97%  Body mass index is 23.83 kg/m².    Physical Exam:  Vitals reviewed.  Constitutional: Oriented to person, place, and time. appears well-developed and well-nourished. No distress.   Cardiovascular: Normal rate, regular rhythm, normal heart sounds and intact distal pulses. Exam reveals no gallop and no friction rub. No murmur heard. No carotid bruits.   Pulmonary/Chest: Effort normal and breath sounds normal. No stridor. No respiratory distress. no wheezes or rales. exhibits no tenderness.   Musculoskeletal: Normal range of motion. exhibits no edema. gretel pedal pulses 2+.  Lymphadenopathy: No cervical or supraclavicular adenopathy.    Neurological: Alert and oriented to person, place, and time. exhibits normal muscle tone.  Skin: Skin is warm and dry. No diaphoresis.  Skin dry  Psychiatric: Normal mood and affect. Behavior is normal.      Assessment and Plan:     The following treatment plan was discussed:    1. Itching  hydrOXYzine HCl (ATARAX) 25 MG Tab    refilled hydroxyzine.  use moisturizing lotion bid.  f/u if sx do not improve         Followup: Return if symptoms worsen or fail to improve.

## 2020-07-31 DIAGNOSIS — G25.81 RLS (RESTLESS LEGS SYNDROME): ICD-10-CM

## 2020-08-03 DIAGNOSIS — G25.81 RLS (RESTLESS LEGS SYNDROME): ICD-10-CM

## 2020-08-03 RX ORDER — ROPINIROLE 1 MG/1
TABLET, FILM COATED ORAL
Qty: 180 TAB | Refills: 0 | Status: SHIPPED | OUTPATIENT
Start: 2020-08-03 | End: 2020-09-16 | Stop reason: SDUPTHER

## 2020-08-03 RX ORDER — GABAPENTIN 100 MG/1
CAPSULE ORAL
Qty: 90 CAP | Refills: 1 | Status: SHIPPED | OUTPATIENT
Start: 2020-08-03 | End: 2021-01-22

## 2020-09-16 ENCOUNTER — OFFICE VISIT (OUTPATIENT)
Dept: MEDICAL GROUP | Facility: MEDICAL CENTER | Age: 70
End: 2020-09-16
Payer: MEDICARE

## 2020-09-16 VITALS
HEIGHT: 60 IN | DIASTOLIC BLOOD PRESSURE: 58 MMHG | SYSTOLIC BLOOD PRESSURE: 116 MMHG | HEART RATE: 60 BPM | BODY MASS INDEX: 24.54 KG/M2 | OXYGEN SATURATION: 98 % | WEIGHT: 125 LBS | TEMPERATURE: 98.4 F

## 2020-09-16 DIAGNOSIS — R34 ANURIA: ICD-10-CM

## 2020-09-16 DIAGNOSIS — R20.2 NUMBNESS AND TINGLING IN BOTH HANDS: ICD-10-CM

## 2020-09-16 DIAGNOSIS — N18.6 ESRD (END STAGE RENAL DISEASE) ON DIALYSIS (HCC): ICD-10-CM

## 2020-09-16 DIAGNOSIS — G25.81 RLS (RESTLESS LEGS SYNDROME): ICD-10-CM

## 2020-09-16 DIAGNOSIS — R20.0 NUMBNESS AND TINGLING IN BOTH HANDS: ICD-10-CM

## 2020-09-16 DIAGNOSIS — R79.89 ELEVATED SERUM FREE T4 LEVEL: ICD-10-CM

## 2020-09-16 DIAGNOSIS — Z99.2 ESRD (END STAGE RENAL DISEASE) ON DIALYSIS (HCC): ICD-10-CM

## 2020-09-16 DIAGNOSIS — E13.22 OTHER SPECIFIED DIABETES MELLITUS WITH CHRONIC KIDNEY DISEASE ON CHRONIC DIALYSIS, WITH LONG-TERM CURRENT USE OF INSULIN (HCC): ICD-10-CM

## 2020-09-16 DIAGNOSIS — Z79.4 OTHER SPECIFIED DIABETES MELLITUS WITH CHRONIC KIDNEY DISEASE ON CHRONIC DIALYSIS, WITH LONG-TERM CURRENT USE OF INSULIN (HCC): ICD-10-CM

## 2020-09-16 DIAGNOSIS — Z99.2 OTHER SPECIFIED DIABETES MELLITUS WITH CHRONIC KIDNEY DISEASE ON CHRONIC DIALYSIS, WITH LONG-TERM CURRENT USE OF INSULIN (HCC): ICD-10-CM

## 2020-09-16 DIAGNOSIS — Z01.818 PRE-TRANSPLANT EVALUATION FOR KIDNEY TRANSPLANT: ICD-10-CM

## 2020-09-16 DIAGNOSIS — N18.6 OTHER SPECIFIED DIABETES MELLITUS WITH CHRONIC KIDNEY DISEASE ON CHRONIC DIALYSIS, WITH LONG-TERM CURRENT USE OF INSULIN (HCC): ICD-10-CM

## 2020-09-16 PROBLEM — F41.9 ANXIETY: Status: ACTIVE | Noted: 2020-09-16

## 2020-09-16 PROBLEM — F41.9 ANXIETY: Status: RESOLVED | Noted: 2020-09-16 | Resolved: 2020-09-16

## 2020-09-16 PROCEDURE — 99214 OFFICE O/P EST MOD 30 MIN: CPT | Performed by: NURSE PRACTITIONER

## 2020-09-16 RX ORDER — ROPINIROLE 1 MG/1
TABLET, FILM COATED ORAL
Qty: 240 TAB | Refills: 3 | Status: SHIPPED | OUTPATIENT
Start: 2020-09-16 | End: 2021-05-31

## 2020-09-16 ASSESSMENT — FIBROSIS 4 INDEX: FIB4 SCORE: 4.5

## 2020-09-16 NOTE — PROGRESS NOTES
Ordering pre-transplant studies per request of [facility] kidney transplant team.    1. Thallium cardiac stress test  2. Non-con CT Abd/Pel. CD must be mailed to Kidney transplant department, [facility] in [place]    Priyank Villar MD  Nephrology

## 2020-09-17 PROBLEM — R20.2 NUMBNESS AND TINGLING IN BOTH HANDS: Status: ACTIVE | Noted: 2020-09-17

## 2020-09-17 PROBLEM — R20.0 NUMBNESS AND TINGLING IN BOTH HANDS: Status: ACTIVE | Noted: 2020-09-17

## 2020-09-17 NOTE — ASSESSMENT & PLAN NOTE
Patient reports intermittent numbness and tingling in both hands.  Nothing makes this worse or better.  No neck or back issues.  Symptoms worse with dialysis.  She was worried that this may be caused by her water pill.

## 2020-09-17 NOTE — ASSESSMENT & PLAN NOTE
Started about 3 months, she has not urinated at all. She feels the urge to urinate. Drinking only sips with her medication, feels thirsty but is told by her dialysis provider that she should not drink any water.     She is in end-stage renal failure on hemodialysis.

## 2020-09-18 NOTE — PROGRESS NOTES
Subjective:   Tere Gallegos is a 69 y.o. female here today for the following concerns:    Anuria  Started about 3 months, she has not urinated at all. She feels the urge to urinate. Drinking only sips with her medication, feels thirsty but is told by her dialysis provider that she should not drink any water.     She is in end-stage renal failure on hemodialysis.    Numbness and tingling in both hands  Patient reports intermittent numbness and tingling in both hands.  Nothing makes this worse or better.  No neck or back issues.  Symptoms worse with dialysis.  She was worried that this may be caused by her water pill.     RLS (restless legs syndrome)  Chronic.  Previously well controlled with ropinirole 1 mg before dialysis and 1 mg before bed.  The 1 mg dose is still doing well prior to dialysis, however at night she is having more restless leg symptoms which is keeping her up at night.         Current medicines (including changes today)  Current Outpatient Medications   Medication Sig Dispense Refill   • ROPINIRole (REQUIP) 1 MG Tab Take one tablet before dialysis, take 2 tablets before bed. 240 Tab 3   • gabapentin (NEURONTIN) 100 MG Cap TAKE 1 CAP BY MOUTH DAILY FOR 5 DAYS, 1 CAP TWICE DAILY X 5 DAYS, THEN 1 CAP 3X DAILY IF NOT DROWSY 90 Cap 1   • hydrOXYzine HCl (ATARAX) 25 MG Tab TOME FERNANDO TABLETA POR VIA ORAL AL ACOSTARSE CUANDO SEA NECESARIO FOR ITCHING 90 Tab 0   • pioglitazone (ACTOS) 30 MG Tab Take 1 Tab by mouth every day for 90 days. 90 Tab 2   • levothyroxine (SYNTHROID) 50 MCG Tab Take 1 Tab by mouth Every morning on an empty stomach. 30 Tab 6   • lisinopril (PRINIVIL) 40 MG tablet Take 1 Tab by mouth 2 times a day. 180 Tab 3   • hydrALAZINE (APRESOLINE) 100 MG tablet Take 1 Tab by mouth 3 times a day. 270 Tab 3   • carvedilol (COREG) 25 MG Tab Take 1 Tab by mouth 2 times a day, with meals. 180 Tab 3   • aspirin (ASA) 81 MG Chew Tab chewable tablet TOME FERNANDO TABLETA TODOS LOS MCCORMICK NOT COVERED  OTC 90 Tab 3   • atorvastatin (LIPITOR) 20 MG Tab Take 20 mg by mouth every evening.     • B Complex-C-Folic Acid (RENAL-SALVATORE) 0.8 MG Tab Take 1 Tab by mouth See Admin Instructions. Pt takes on Mon, Wed, and Fri before dialysis     • furosemide (LASIX) 40 MG Tab Take 1 Tab by mouth every day. Hold for SBP less than 100 90 Tab 3   • amLODIPine (NORVASC) 10 MG Tab Take 1 Tab by mouth every day. 90 Tab 3     No current facility-administered medications for this visit.      She  has a past medical history of Acquired hypothyroidism (5/4/2020), CAD (coronary artery disease), Chronic diastolic heart failure (HCC) (5/4/2020), Coronary artery disease due to lipid rich plaque, Dental disorder, Diabetes ([facility]), ESRD (end stage renal disease) on dialysis ([facility]) (5/4/2020), Hyperlipidemia, Hypertension, Kidney transplant candidate, Presence of drug-eluting stent in right coronary artery, QT prolongation (1/22/2020), RLS (restless legs syndrome) (8/5/2016), and Transaminitis (12/22/2018).    ROS   No chest pain, no shortness of breath, no abdominal pain  Positive ROS as per HPI.  All other systems reviewed and are negative.     Objective:     /58 (BP Location: Right arm, Patient Position: Sitting, BP Cuff Size: Adult)   Pulse 60   Temp 36.9 °C (98.4 °F) (Temporal)   Ht 1.524 m (5')   Wt 56.7 kg (125 lb)   SpO2 98%  Body mass index is 24.41 kg/m².     Physical Exam:  Constitutional: Alert, no distress.  Skin: Warm, dry, good turgor, no rashes in visible areas.  Eye: Equal, round and reactive, conjunctiva clear, lids normal.  ENMT: Mask in place  Respiratory: Unlabored respiratory effort, lungs clear to auscultation, no wheezes, no ronchi.  Cardiovascular: Normal S1, S2, no murmur, no edema.  Psych: Alert and oriented x3, normal affect and mood.        Assessment and Plan:   The following treatment plan was discussed    1. Anuria  Stable  Discussed with patient that anuria is the normal progression in end-stage renal  disease and dialysis.  She may occasionally have some urine output, but she should not be concerned that she does not urinate daily.  Patient verbalized understanding    2. RLS (restless legs syndrome)  Unstable  Continue ropinirole 1 mg prior to dialysis.  Increase to 2 mg before bed.  - ROPINIRole (REQUIP) 1 MG Tab; Take one tablet before dialysis, take 2 tablets before bed.  Dispense: 240 Tab; Refill: 3    3. Numbness and tingling in both hands  Unstable  Worse with dialysis  Advised patient to discuss with dialysis nurses and nephrology to see if dialysate can be adjusted for this.  Check electrolytes and thyroid function as her last thyroid function test showed an elevated T4  - Comp Metabolic Panel; Future  - TSH; Future  - FREE THYROXINE; Future    4. Elevated serum free T4 level  - TSH; Future  - FREE THYROXINE; Future      Followup: Return in about 4 months (around 1/16/2021).    I have placed the below orders and discussed them with an approved delegating provider. The MA is performing the below orders under the direction of Dr. Davila

## 2020-09-18 NOTE — ASSESSMENT & PLAN NOTE
Chronic.  Previously well controlled with ropinirole 1 mg before dialysis and 1 mg before bed.  The 1 mg dose is still doing well prior to dialysis, however at night she is having more restless leg symptoms which is keeping her up at night.

## 2020-09-24 ENCOUNTER — HOSPITAL ENCOUNTER (OUTPATIENT)
Dept: LAB | Facility: MEDICAL CENTER | Age: 70
End: 2020-09-24
Attending: NURSE PRACTITIONER
Payer: MEDICARE

## 2020-09-24 DIAGNOSIS — R79.89 ELEVATED SERUM FREE T4 LEVEL: ICD-10-CM

## 2020-09-24 DIAGNOSIS — R20.0 NUMBNESS AND TINGLING IN BOTH HANDS: ICD-10-CM

## 2020-09-24 DIAGNOSIS — R20.2 NUMBNESS AND TINGLING IN BOTH HANDS: ICD-10-CM

## 2020-09-24 LAB
ALBUMIN SERPL BCP-MCNC: 3.9 G/DL (ref 3.2–4.9)
ALBUMIN/GLOB SERPL: 1.4 G/DL
ALP SERPL-CCNC: 78 U/L (ref 30–99)
ALT SERPL-CCNC: 13 U/L (ref 2–50)
ANION GAP SERPL CALC-SCNC: 13 MMOL/L (ref 7–16)
AST SERPL-CCNC: 16 U/L (ref 12–45)
BILIRUB SERPL-MCNC: 0.2 MG/DL (ref 0.1–1.5)
BUN SERPL-MCNC: 47 MG/DL (ref 8–22)
CALCIUM SERPL-MCNC: 9.5 MG/DL (ref 8.4–10.2)
CHLORIDE SERPL-SCNC: 98 MMOL/L (ref 96–112)
CO2 SERPL-SCNC: 30 MMOL/L (ref 20–33)
CREAT SERPL-MCNC: 6.82 MG/DL (ref 0.5–1.4)
GLOBULIN SER CALC-MCNC: 2.8 G/DL (ref 1.9–3.5)
GLUCOSE SERPL-MCNC: 97 MG/DL (ref 65–99)
POTASSIUM SERPL-SCNC: 6.3 MMOL/L (ref 3.6–5.5)
PROT SERPL-MCNC: 6.7 G/DL (ref 6–8.2)
SODIUM SERPL-SCNC: 141 MMOL/L (ref 135–145)
T4 FREE SERPL-MCNC: 1.38 NG/DL (ref 0.93–1.7)
TSH SERPL DL<=0.005 MIU/L-ACNC: 3.84 UIU/ML (ref 0.38–5.33)

## 2020-09-24 PROCEDURE — 80053 COMPREHEN METABOLIC PANEL: CPT

## 2020-09-24 PROCEDURE — 36415 COLL VENOUS BLD VENIPUNCTURE: CPT

## 2020-09-24 PROCEDURE — 84443 ASSAY THYROID STIM HORMONE: CPT

## 2020-09-24 PROCEDURE — 84439 ASSAY OF FREE THYROXINE: CPT

## 2020-09-25 ENCOUNTER — TELEPHONE (OUTPATIENT)
Dept: MEDICAL GROUP | Facility: MEDICAL CENTER | Age: 70
End: 2020-09-25

## 2020-09-25 NOTE — TELEPHONE ENCOUNTER
----- Message from ANGELITA Concepcion sent at 9/25/2020 10:30 AM PDT -----  Please notify patient her labs were unremarkable to explain her numbness and tingling except that her potassium level was elevated. She should tell her dialysis nurses about this today.     ANGELITA Concepcion

## 2020-09-29 ENCOUNTER — HOSPITAL ENCOUNTER (OUTPATIENT)
Dept: LAB | Facility: MEDICAL CENTER | Age: 70
End: 2020-09-29
Attending: INTERNAL MEDICINE
Payer: MEDICARE

## 2020-09-29 DIAGNOSIS — E03.8 SUBCLINICAL HYPOTHYROIDISM: ICD-10-CM

## 2020-09-29 DIAGNOSIS — N18.6 ESRD (END STAGE RENAL DISEASE) (HCC): ICD-10-CM

## 2020-09-29 DIAGNOSIS — E78.2 MIXED HYPERLIPIDEMIA: ICD-10-CM

## 2020-09-29 DIAGNOSIS — E11.65 TYPE 2 DIABETES MELLITUS WITH HYPERGLYCEMIA, WITHOUT LONG-TERM CURRENT USE OF INSULIN (HCC): ICD-10-CM

## 2020-09-29 LAB
ALBUMIN SERPL BCP-MCNC: 3.9 G/DL (ref 3.2–4.9)
ALBUMIN/GLOB SERPL: 1.4 G/DL
ALP SERPL-CCNC: 66 U/L (ref 30–99)
ALT SERPL-CCNC: 15 U/L (ref 2–50)
ANION GAP SERPL CALC-SCNC: 12 MMOL/L (ref 7–16)
AST SERPL-CCNC: 22 U/L (ref 12–45)
BILIRUB SERPL-MCNC: 0.2 MG/DL (ref 0.1–1.5)
BUN SERPL-MCNC: 33 MG/DL (ref 8–22)
CALCIUM SERPL-MCNC: 9.2 MG/DL (ref 8.4–10.2)
CHLORIDE SERPL-SCNC: 96 MMOL/L (ref 96–112)
CHOLEST SERPL-MCNC: 125 MG/DL (ref 100–199)
CO2 SERPL-SCNC: 33 MMOL/L (ref 20–33)
CREAT SERPL-MCNC: 5.6 MG/DL (ref 0.5–1.4)
CREAT UR-MCNC: 16.82 MG/DL
EST. AVERAGE GLUCOSE BLD GHB EST-MCNC: 126 MG/DL
FASTING STATUS PATIENT QL REPORTED: NORMAL
GLOBULIN SER CALC-MCNC: 2.7 G/DL (ref 1.9–3.5)
GLUCOSE SERPL-MCNC: 100 MG/DL (ref 65–99)
HBA1C MFR BLD: 6 % (ref 0–5.6)
HDLC SERPL-MCNC: 48 MG/DL
LDLC SERPL CALC-MCNC: 54 MG/DL
MICROALBUMIN UR-MCNC: 104.4 MG/DL
MICROALBUMIN/CREAT UR: 6207 MG/G (ref 0–30)
POTASSIUM SERPL-SCNC: 5.7 MMOL/L (ref 3.6–5.5)
PROT SERPL-MCNC: 6.6 G/DL (ref 6–8.2)
SODIUM SERPL-SCNC: 141 MMOL/L (ref 135–145)
T4 FREE SERPL-MCNC: 1.47 NG/DL (ref 0.93–1.7)
TRIGL SERPL-MCNC: 113 MG/DL (ref 0–149)
TSH SERPL DL<=0.005 MIU/L-ACNC: 5.66 UIU/ML (ref 0.38–5.33)

## 2020-09-29 PROCEDURE — 80053 COMPREHEN METABOLIC PANEL: CPT

## 2020-09-29 PROCEDURE — 82570 ASSAY OF URINE CREATININE: CPT

## 2020-09-29 PROCEDURE — 84443 ASSAY THYROID STIM HORMONE: CPT

## 2020-09-29 PROCEDURE — 82043 UR ALBUMIN QUANTITATIVE: CPT

## 2020-09-29 PROCEDURE — 36415 COLL VENOUS BLD VENIPUNCTURE: CPT

## 2020-09-29 PROCEDURE — 84439 ASSAY OF FREE THYROXINE: CPT

## 2020-09-29 PROCEDURE — 83036 HEMOGLOBIN GLYCOSYLATED A1C: CPT | Mod: GA

## 2020-09-29 PROCEDURE — 80061 LIPID PANEL: CPT

## 2020-10-05 ENCOUNTER — TELEPHONE (OUTPATIENT)
Dept: ENDOCRINOLOGY | Facility: MEDICAL CENTER | Age: 70
End: 2020-10-05

## 2020-10-06 ENCOUNTER — TELEPHONE (OUTPATIENT)
Dept: ENDOCRINOLOGY | Facility: MEDICAL CENTER | Age: 70
End: 2020-10-06

## 2020-10-07 ENCOUNTER — OFFICE VISIT (OUTPATIENT)
Dept: ENDOCRINOLOGY | Facility: MEDICAL CENTER | Age: 70
End: 2020-10-07
Attending: INTERNAL MEDICINE
Payer: MEDICARE

## 2020-10-07 VITALS
HEART RATE: 78 BPM | BODY MASS INDEX: 25.52 KG/M2 | DIASTOLIC BLOOD PRESSURE: 72 MMHG | WEIGHT: 130 LBS | OXYGEN SATURATION: 96 % | HEIGHT: 60 IN | SYSTOLIC BLOOD PRESSURE: 118 MMHG

## 2020-10-07 DIAGNOSIS — Z99.2 CONTROLLED TYPE 2 DIABETES MELLITUS WITH CHRONIC KIDNEY DISEASE ON CHRONIC DIALYSIS, WITHOUT LONG-TERM CURRENT USE OF INSULIN (HCC): ICD-10-CM

## 2020-10-07 DIAGNOSIS — E78.2 MIXED HYPERLIPIDEMIA: ICD-10-CM

## 2020-10-07 DIAGNOSIS — E11.22 CONTROLLED TYPE 2 DIABETES MELLITUS WITH CHRONIC KIDNEY DISEASE ON CHRONIC DIALYSIS, WITHOUT LONG-TERM CURRENT USE OF INSULIN (HCC): ICD-10-CM

## 2020-10-07 DIAGNOSIS — Z79.84 LONG TERM (CURRENT) USE OF ORAL HYPOGLYCEMIC DRUGS: ICD-10-CM

## 2020-10-07 DIAGNOSIS — E03.8 SUBCLINICAL HYPOTHYROIDISM: ICD-10-CM

## 2020-10-07 DIAGNOSIS — N18.6 CONTROLLED TYPE 2 DIABETES MELLITUS WITH CHRONIC KIDNEY DISEASE ON CHRONIC DIALYSIS, WITHOUT LONG-TERM CURRENT USE OF INSULIN (HCC): ICD-10-CM

## 2020-10-07 PROCEDURE — 99211 OFF/OP EST MAY X REQ PHY/QHP: CPT | Performed by: INTERNAL MEDICINE

## 2020-10-07 PROCEDURE — 99214 OFFICE O/P EST MOD 30 MIN: CPT | Performed by: INTERNAL MEDICINE

## 2020-10-07 RX ORDER — ATORVASTATIN CALCIUM 20 MG/1
20 TABLET, FILM COATED ORAL DAILY
Qty: 90 TAB | Refills: 3 | Status: SHIPPED | OUTPATIENT
Start: 2020-10-07 | End: 2021-01-05

## 2020-10-07 RX ORDER — LEVOTHYROXINE SODIUM 0.05 MG/1
50 TABLET ORAL
Qty: 90 TAB | Refills: 3 | Status: SHIPPED | OUTPATIENT
Start: 2020-10-07 | End: 2021-01-05

## 2020-10-07 RX ORDER — PIOGLITAZONEHYDROCHLORIDE 30 MG/1
30 TABLET ORAL DAILY
Qty: 90 TAB | Refills: 13 | Status: SHIPPED | OUTPATIENT
Start: 2020-10-07 | End: 2021-01-05

## 2020-10-07 ASSESSMENT — FIBROSIS 4 INDEX: FIB4 SCORE: 3.01

## 2020-10-08 NOTE — PROGRESS NOTES
CHIEF COMPLAINT: Patient is here for follow up of Type 2 Diabetes Mellitus    HPI:     Tere Gallegos is a 68 y.o. female with Type 2 Diabetes Mellitus here for follow up.     Labs from February 19, 2020 shows her A1c has improved to 5.6%.  Previously her A1c was elevated at 8.7%.    I initially saw her as a consult from Kathy CHAPA for poorly controlled diabetes at baseline while not taking any medications.  She has a history of end-stage renal disease and is on hemodialysis and also has a history of subclinical hypothyroidism and hyperlipidemia.   After her initial consultation visit I started her on Trulicity       On follow-up she is on Pioglitazone 30mg daily.  She used to marimar on Trulicity but it was stopped due to drastic loss of appetite     She still reports poor appetite but I believe this is related now to her ESRD  She reports that she is scheduled to undergo kidney transplantation in [place] but she does not know the details    She also had uncontrolled subclinical hypothyroidism at baseline with suboptimal TSH of 3.2 in April.   She is non compliant and she went back to taking levothyroxine 25 mcg daily when she should be on 50mcg daily  Her TSH angela to 5.0 on September 2020 because of her noncompliance with her thyroid medications        She has hyperlipidemia that is fairly controlled and is taking atorvastatin and she is tolerating this medication well.   Her last LDL cholesterol was well controlled at 37 with triglycerides of 99 on April 27, 2020      She also has essential hypertension and is taking metoprolol, lisinopril and amlodipine and blood pressure is well controlled and she is tolerating her medications well      BG Diary:   Patient did not bring her glucose meter    Weight has been stable    Diabetes Complications   Retinopathy: No known retinopathy.  Last eye exam: 5/2020 at [facility]  Neuropathy: Denies paresthesias or numbness in hands or feet. Denies any foot  wounds.  Exercise: Minimal.  Diet: Fair.  Patient's medications, allergies, and social histories were reviewed and updated as appropriate.    ROS:     CONS:     No fever, no chills   EYES:     No diplopia, no blurry vision   CV:           No chest pain, no palpitations   PULM:     No SOB, no cough, no hemoptysis.   GI:            No nausea, no vomiting, no diarrhea, no constipation   ENDO:     No polyuria, no polydipsia, no heat intolerance, no cold intolerance       Past Medical History:  Problem List:  2020-09: Numbness and tingling in both hands  2020-09: Anuria  2020-09: Anxiety  2020-07: Subclinical hypothyroidism  2020-07: Long term (current) use of oral hypoglycemic drugs  2020-05: Chronic diastolic heart failure (HCC)  2020-05: ESRD (end stage renal disease) on dialysis ([facility])  2020-05: Acquired hypothyroidism  2020-05: Dental disorder  2020-01: QT prolongation  2019-11: Hyperlipidemia  2018-12: Transaminitis  2016-09: Encounter for cervical Pap smear with pelvic exam  2016-09: Encounter to establish care with new doctor  2016-08: RLS (restless legs syndrome)  2016-08: Controlled type 2 diabetes mellitus with chronic kidney   disease on chronic dialysis, without long-term current use of insulin   ([facility])  2014-01: Hyperkalemia  2013-09: Essential hypertension  Kidney transplant candidate  Coronary artery disease due to lipid rich plaque  Presence of drug-eluting stent in right coronary artery      Past Surgical History:  Past Surgical History:   Procedure Laterality Date   • ZZZ CARDIAC CATH  9/7/2016    RCA stented with 2 Synergy drug-eluting stents.   • RECOVERY  8/16/2016    Procedure: CATH LAB [facility] WITH POSSIBLE DR. CASTILLO;  Surgeon: [facility];  Location: SURGERY PRE-POST PROC UNIT [facility];  Service:    • ZZZ CARDIAC CATH  8/16/16    100% RCA   • OTHER Left 2014    left arm upper extremity fistula   • OTHER ABDOMINAL SURGERY      left kidney removed due to cancer        Allergies:  Patient has  no known allergies.     Social History:  Social History     Tobacco Use   • Smoking status: Never Smoker   • Smokeless tobacco: Never Used   Substance Use Topics   • Alcohol use: No     Alcohol/week: 0.0 oz   • Drug use: No        Family History:   family history includes Diabetes in her brother and sister; Other in her sister.      PHYSICAL EXAM:   OBJECTIVE:  Vital signs: /72 (BP Location: Right arm, Patient Position: Sitting, BP Cuff Size: Adult)   Pulse 78   Ht 1.524 m (5')   Wt 59 kg (130 lb)   LMP  (LMP Unknown)   SpO2 96%   BMI 25.39 kg/m²   GENERAL: Well-developed, well-nourished in no apparent distress.   EYE:  No ocular asymmetry, PERRLA  HENT: Pink, moist mucous membranes.    NECK: No thyromegaly.   CARDIOVASCULAR:  No murmurs  LUNGS: Clear breath sounds  ABDOMEN: Soft, nontender   EXTREMITIES: No clubbing, cyanosis, or edema.   NEUROLOGICAL: No gross focal motor abnormalities   LYMPH: No cervical adenopathy palpated.   SKIN: No rashes, lesions.   Monofilament testing with a 10 gram force: sensation: intact bilaterally  Visual Inspection: Feet without maceration, ulcers, or fissures.  Pedal pulses: intact bilaterally    Labs:  Lab Results   Component Value Date/Time    HBA1C 6.0 (H) 09/29/2020 10:56 AM        Lab Results   Component Value Date/Time    WBC 3.1 (L) 01/23/2020 04:42 AM    RBC 2.72 (L) 01/23/2020 04:42 AM    HEMOGLOBIN 8.2 (L) 01/23/2020 04:42 AM    MCV 87.5 01/23/2020 04:42 AM    MCH 30.1 01/23/2020 04:42 AM    MCHC 34.5 01/23/2020 04:42 AM    RDW 42.1 01/23/2020 04:42 AM    MPV 11.2 01/23/2020 04:42 AM       Lab Results   Component Value Date/Time    SODIUM 141 09/29/2020 10:56 AM    POTASSIUM 5.7 (H) 09/29/2020 10:56 AM    CHLORIDE 96 09/29/2020 10:56 AM    CO2 33 09/29/2020 10:56 AM    ANION 12.0 09/29/2020 10:56 AM    GLUCOSE 100 (H) 09/29/2020 10:56 AM    BUN 33 (H) 09/29/2020 10:56 AM    CREATININE 5.60 (HH) 09/29/2020 10:56 AM    CALCIUM 9.2 09/29/2020 10:56 AM    DEMETRIUS  22 09/29/2020 10:56 AM    ALTSGPT 15 09/29/2020 10:56 AM    TBILIRUBIN 0.2 09/29/2020 10:56 AM    ALBUMIN 3.9 09/29/2020 10:56 AM    TOTPROTEIN 6.6 09/29/2020 10:56 AM    GLOBULIN 2.7 09/29/2020 10:56 AM    AGRATIO 1.4 09/29/2020 10:56 AM       Lab Results   Component Value Date/Time    CHOLSTRLTOT 91 (L) 12/23/2018 0101    TRIGLYCERIDE 139 12/23/2018 0101    HDL 30 (A) 12/23/2018 0101    LDL 33 12/23/2018 0101       Lab Results   Component Value Date/Time    MALBCRT 6207 (H) 09/29/2020 10:55 AM    MICROALBUR 104.4 09/29/2020 10:55 AM        Lab Results   Component Value Date/Time    TSHULTRASEN 3.220 11/12/2019 1247     No results found for: FREEDIR  No results found for: FREET3  No results found for: THYSTIMIG        ASSESSMENT/PLAN:     1. Controlled type 2 diabetes mellitus with chronic kidney disease on chronic dialysis, without long-term current use of insulin (HCC)  Well-controlled  Continue pioglitazone 30 mg daily  Recommend that she watch her carb intake  Recommend that she exercise regularly  We will plan for follow-up in 3 months      2. Subclinical hypothyroidism  Unstable  She is noncompliant and she went back to her previous medication for unclear reasons  This is why her TSH is elevated  I want her to discontinue levothyroxine 25 and restart levothyroxine 50 MCG daily  Reviewed the importance of adherence to thyroid hormone replacement therapy.  I will see her again in 3 months with repeat of her TSH    3. Mixed hyperlipidemia  Well-controlled, continue atorvastatin   follow low-fat diet  Recommend repeating fasting lipids in 12 months      4. Essential hypertension  Well-controlled continue current medications      5. Long term (current) use of oral hypoglycemic drugs  Patient is on oral agents for type 2 diabetes management      No follow-ups on file.      Thank you kindly for allowing me to participate in the diabetes care plan for this patient.    Jimmy Bloom MD, FACE, [facility]  12/05/19    CC:    MALGORZATA Concepcion.

## 2020-10-20 ENCOUNTER — HOSPITAL ENCOUNTER (OUTPATIENT)
Dept: RADIOLOGY | Facility: MEDICAL CENTER | Age: 70
End: 2020-10-20
Attending: INTERNAL MEDICINE
Payer: MEDICARE

## 2020-10-20 DIAGNOSIS — Z99.2 ESRD (END STAGE RENAL DISEASE) ON DIALYSIS (HCC): ICD-10-CM

## 2020-10-20 DIAGNOSIS — N18.6 OTHER SPECIFIED DIABETES MELLITUS WITH CHRONIC KIDNEY DISEASE ON CHRONIC DIALYSIS, WITH LONG-TERM CURRENT USE OF INSULIN (HCC): ICD-10-CM

## 2020-10-20 DIAGNOSIS — Z79.4 OTHER SPECIFIED DIABETES MELLITUS WITH CHRONIC KIDNEY DISEASE ON CHRONIC DIALYSIS, WITH LONG-TERM CURRENT USE OF INSULIN (HCC): ICD-10-CM

## 2020-10-20 DIAGNOSIS — Z99.2 OTHER SPECIFIED DIABETES MELLITUS WITH CHRONIC KIDNEY DISEASE ON CHRONIC DIALYSIS, WITH LONG-TERM CURRENT USE OF INSULIN (HCC): ICD-10-CM

## 2020-10-20 DIAGNOSIS — E13.22 OTHER SPECIFIED DIABETES MELLITUS WITH CHRONIC KIDNEY DISEASE ON CHRONIC DIALYSIS, WITH LONG-TERM CURRENT USE OF INSULIN (HCC): ICD-10-CM

## 2020-10-20 DIAGNOSIS — Z01.818 PRE-TRANSPLANT EVALUATION FOR KIDNEY TRANSPLANT: ICD-10-CM

## 2020-10-20 DIAGNOSIS — N18.6 ESRD (END STAGE RENAL DISEASE) ON DIALYSIS (HCC): ICD-10-CM

## 2020-10-20 PROCEDURE — 93018 CV STRESS TEST I&R ONLY: CPT | Performed by: INTERNAL MEDICINE

## 2020-10-20 PROCEDURE — 74176 CT ABD & PELVIS W/O CONTRAST: CPT

## 2020-10-20 PROCEDURE — A9502 TC99M TETROFOSMIN: HCPCS

## 2020-10-20 PROCEDURE — 700111 HCHG RX REV CODE 636 W/ 250 OVERRIDE (IP)

## 2020-10-20 PROCEDURE — 78452 HT MUSCLE IMAGE SPECT MULT: CPT | Mod: 26 | Performed by: INTERNAL MEDICINE

## 2020-10-20 RX ORDER — REGADENOSON 0.08 MG/ML
INJECTION, SOLUTION INTRAVENOUS
Status: COMPLETED
Start: 2020-10-20 | End: 2020-10-20

## 2020-10-20 RX ORDER — AMINOPHYLLINE 25 MG/ML
100 INJECTION, SOLUTION INTRAVENOUS
Status: DISCONTINUED | OUTPATIENT
Start: 2020-10-20 | End: 2020-10-21 | Stop reason: HOSPADM

## 2020-10-20 RX ADMIN — REGADENOSON 0.4 MG: 0.08 INJECTION, SOLUTION INTRAVENOUS at 06:45

## 2020-11-18 ENCOUNTER — APPOINTMENT (OUTPATIENT)
Dept: RADIOLOGY | Facility: MEDICAL CENTER | Age: 70
End: 2020-11-18
Attending: EMERGENCY MEDICINE
Payer: MEDICARE

## 2020-11-18 ENCOUNTER — HOSPITAL ENCOUNTER (OUTPATIENT)
Facility: MEDICAL CENTER | Age: 70
End: 2020-11-20
Attending: EMERGENCY MEDICINE | Admitting: HOSPITALIST
Payer: MEDICARE

## 2020-11-18 DIAGNOSIS — J12.82 PNEUMONIA DUE TO COVID-19 VIRUS: ICD-10-CM

## 2020-11-18 DIAGNOSIS — U07.1 PNEUMONIA DUE TO COVID-19 VIRUS: ICD-10-CM

## 2020-11-18 DIAGNOSIS — R94.31 EKG ABNORMALITIES: ICD-10-CM

## 2020-11-18 DIAGNOSIS — J18.9 PNEUMONIA OF RIGHT LUNG DUE TO INFECTIOUS ORGANISM, UNSPECIFIED PART OF LUNG: ICD-10-CM

## 2020-11-18 PROBLEM — R79.89 ELEVATED TROPONIN: Status: ACTIVE | Noted: 2018-12-22

## 2020-11-18 PROBLEM — D61.818 PANCYTOPENIA (HCC): Status: ACTIVE | Noted: 2020-11-18

## 2020-11-18 LAB
ALBUMIN SERPL BCP-MCNC: 4 G/DL (ref 3.2–4.9)
ALBUMIN/GLOB SERPL: 1.1 G/DL
ALP SERPL-CCNC: 81 U/L (ref 30–99)
ALT SERPL-CCNC: 20 U/L (ref 2–50)
ANION GAP SERPL CALC-SCNC: 15 MMOL/L (ref 7–16)
AST SERPL-CCNC: 33 U/L (ref 12–45)
BASOPHILS # BLD AUTO: 0.5 % (ref 0–1.8)
BASOPHILS # BLD: 0.01 K/UL (ref 0–0.12)
BILIRUB SERPL-MCNC: 0.4 MG/DL (ref 0.1–1.5)
BUN SERPL-MCNC: 15 MG/DL (ref 8–22)
CALCIUM SERPL-MCNC: 9.4 MG/DL (ref 8.4–10.2)
CHLORIDE SERPL-SCNC: 91 MMOL/L (ref 96–112)
CO2 SERPL-SCNC: 29 MMOL/L (ref 20–33)
COVID ORDER STATUS COVID19: NORMAL
CREAT SERPL-MCNC: 4.25 MG/DL (ref 0.5–1.4)
EKG IMPRESSION: NORMAL
EOSINOPHIL # BLD AUTO: 0 K/UL (ref 0–0.51)
EOSINOPHIL NFR BLD: 0 % (ref 0–6.9)
ERYTHROCYTE [DISTWIDTH] IN BLOOD BY AUTOMATED COUNT: 48.5 FL (ref 35.9–50)
FLUAV RNA SPEC QL NAA+PROBE: NEGATIVE
FLUBV RNA SPEC QL NAA+PROBE: NEGATIVE
GLOBULIN SER CALC-MCNC: 3.7 G/DL (ref 1.9–3.5)
GLUCOSE SERPL-MCNC: 101 MG/DL (ref 65–99)
HCT VFR BLD AUTO: 28.6 % (ref 37–47)
HGB BLD-MCNC: 9.2 G/DL (ref 12–16)
IMM GRANULOCYTES # BLD AUTO: 0.01 K/UL (ref 0–0.11)
IMM GRANULOCYTES NFR BLD AUTO: 0.5 % (ref 0–0.9)
LACTATE BLD-SCNC: 1.1 MMOL/L (ref 0.5–2)
LIPASE SERPL-CCNC: 74 U/L (ref 7–58)
LYMPHOCYTES # BLD AUTO: 0.21 K/UL (ref 1–4.8)
LYMPHOCYTES NFR BLD: 10.3 % (ref 22–41)
MCH RBC QN AUTO: 30.1 PG (ref 27–33)
MCHC RBC AUTO-ENTMCNC: 32.2 G/DL (ref 33.6–35)
MCV RBC AUTO: 93.5 FL (ref 81.4–97.8)
MONOCYTES # BLD AUTO: 0.16 K/UL (ref 0–0.85)
MONOCYTES NFR BLD AUTO: 7.8 % (ref 0–13.4)
NEUTROPHILS # BLD AUTO: 1.65 K/UL (ref 2–7.15)
NEUTROPHILS NFR BLD: 80.9 % (ref 44–72)
NRBC # BLD AUTO: 0 K/UL
NRBC BLD-RTO: 0 /100 WBC
NT-PROBNP SERPL IA-MCNC: ABNORMAL PG/ML (ref 0–125)
PLATELET # BLD AUTO: 102 K/UL (ref 164–446)
PMV BLD AUTO: 11.2 FL (ref 9–12.9)
POTASSIUM SERPL-SCNC: 4.4 MMOL/L (ref 3.6–5.5)
PROCALCITONIN SERPL-MCNC: 0.45 NG/ML
PROT SERPL-MCNC: 7.7 G/DL (ref 6–8.2)
RBC # BLD AUTO: 3.06 M/UL (ref 4.2–5.4)
RSV RNA SPEC QL NAA+PROBE: NEGATIVE
SARS-COV-2 RNA RESP QL NAA+PROBE: DETECTED
SODIUM SERPL-SCNC: 135 MMOL/L (ref 135–145)
SPECIMEN SOURCE: ABNORMAL
TROPONIN T SERPL-MCNC: 67 NG/L (ref 6–19)
WBC # BLD AUTO: 2 K/UL (ref 4.8–10.8)

## 2020-11-18 PROCEDURE — 99285 EMERGENCY DEPT VISIT HI MDM: CPT

## 2020-11-18 PROCEDURE — 86140 C-REACTIVE PROTEIN: CPT

## 2020-11-18 PROCEDURE — 93005 ELECTROCARDIOGRAM TRACING: CPT | Performed by: EMERGENCY MEDICINE

## 2020-11-18 PROCEDURE — 83690 ASSAY OF LIPASE: CPT

## 2020-11-18 PROCEDURE — 80053 COMPREHEN METABOLIC PANEL: CPT

## 2020-11-18 PROCEDURE — 71045 X-RAY EXAM CHEST 1 VIEW: CPT

## 2020-11-18 PROCEDURE — 99215 OFFICE O/P EST HI 40 MIN: CPT | Performed by: INTERNAL MEDICINE

## 2020-11-18 PROCEDURE — 84484 ASSAY OF TROPONIN QUANT: CPT

## 2020-11-18 PROCEDURE — 36415 COLL VENOUS BLD VENIPUNCTURE: CPT

## 2020-11-18 PROCEDURE — 96375 TX/PRO/DX INJ NEW DRUG ADDON: CPT

## 2020-11-18 PROCEDURE — 96365 THER/PROPH/DIAG IV INF INIT: CPT

## 2020-11-18 PROCEDURE — U0003 INFECTIOUS AGENT DETECTION BY NUCLEIC ACID (DNA OR RNA); SEVERE ACUTE RESPIRATORY SYNDROME CORONAVIRUS 2 (SARS-COV-2) (CORONAVIRUS DISEASE [COVID-19]), AMPLIFIED PROBE TECHNIQUE, MAKING USE OF HIGH THROUGHPUT TECHNOLOGIES AS DESCRIBED BY CMS-2020-01-R: HCPCS

## 2020-11-18 PROCEDURE — 85379 FIBRIN DEGRADATION QUANT: CPT

## 2020-11-18 PROCEDURE — 83880 ASSAY OF NATRIURETIC PEPTIDE: CPT

## 2020-11-18 PROCEDURE — 700111 HCHG RX REV CODE 636 W/ 250 OVERRIDE (IP): Performed by: EMERGENCY MEDICINE

## 2020-11-18 PROCEDURE — 0241U HCHG SARS-COV-2 COVID-19 NFCT DS RESP RNA 4 TRGT MIC: CPT

## 2020-11-18 PROCEDURE — 84145 PROCALCITONIN (PCT): CPT

## 2020-11-18 PROCEDURE — 83605 ASSAY OF LACTIC ACID: CPT

## 2020-11-18 PROCEDURE — 85025 COMPLETE CBC W/AUTO DIFF WBC: CPT

## 2020-11-18 PROCEDURE — 87040 BLOOD CULTURE FOR BACTERIA: CPT

## 2020-11-18 PROCEDURE — 700105 HCHG RX REV CODE 258: Performed by: EMERGENCY MEDICINE

## 2020-11-18 RX ORDER — HYDRALAZINE HYDROCHLORIDE 25 MG/1
100 TABLET, FILM COATED ORAL 3 TIMES DAILY
Status: DISCONTINUED | OUTPATIENT
Start: 2020-11-19 | End: 2020-11-19

## 2020-11-18 RX ORDER — ROPINIROLE 1 MG/1
2 TABLET, FILM COATED ORAL
Status: DISCONTINUED | OUTPATIENT
Start: 2020-11-19 | End: 2020-11-20 | Stop reason: HOSPADM

## 2020-11-18 RX ORDER — LISINOPRIL 20 MG/1
40 TABLET ORAL 2 TIMES DAILY
Refills: 3 | Status: DISCONTINUED | OUTPATIENT
Start: 2020-11-19 | End: 2020-11-19

## 2020-11-18 RX ORDER — ASPIRIN 81 MG/1
81 TABLET, CHEWABLE ORAL DAILY
Status: DISCONTINUED | OUTPATIENT
Start: 2020-11-19 | End: 2020-11-20 | Stop reason: HOSPADM

## 2020-11-18 RX ORDER — ACETAMINOPHEN 325 MG/1
TABLET ORAL
Status: DISPENSED
Start: 2020-11-18 | End: 2020-11-19

## 2020-11-18 RX ORDER — ASPIRIN 81 MG/1
324 TABLET, CHEWABLE ORAL ONCE
Status: DISPENSED | OUTPATIENT
Start: 2020-11-18 | End: 2020-11-19

## 2020-11-18 RX ORDER — HEPARIN SODIUM 5000 [USP'U]/ML
5000 INJECTION, SOLUTION INTRAVENOUS; SUBCUTANEOUS EVERY 8 HOURS
Status: DISCONTINUED | OUTPATIENT
Start: 2020-11-18 | End: 2020-11-20 | Stop reason: HOSPADM

## 2020-11-18 RX ORDER — DOXYCYCLINE 100 MG/1
100 TABLET ORAL EVERY 12 HOURS
Status: DISCONTINUED | OUTPATIENT
Start: 2020-11-18 | End: 2020-11-19

## 2020-11-18 RX ORDER — FUROSEMIDE 40 MG/1
40 TABLET ORAL DAILY
Status: DISCONTINUED | OUTPATIENT
Start: 2020-11-19 | End: 2020-11-20 | Stop reason: HOSPADM

## 2020-11-18 RX ORDER — AMLODIPINE BESYLATE 5 MG/1
10 TABLET ORAL DAILY
Status: DISCONTINUED | OUTPATIENT
Start: 2020-11-18 | End: 2020-11-19

## 2020-11-18 RX ORDER — BISACODYL 10 MG
10 SUPPOSITORY, RECTAL RECTAL
Status: DISCONTINUED | OUTPATIENT
Start: 2020-11-18 | End: 2020-11-20 | Stop reason: HOSPADM

## 2020-11-18 RX ORDER — ACETAMINOPHEN 325 MG/1
650 TABLET ORAL EVERY 6 HOURS PRN
Status: DISCONTINUED | OUTPATIENT
Start: 2020-11-18 | End: 2020-11-20 | Stop reason: HOSPADM

## 2020-11-18 RX ORDER — LEVOTHYROXINE SODIUM 0.05 MG/1
50 TABLET ORAL
Status: DISCONTINUED | OUTPATIENT
Start: 2020-11-19 | End: 2020-11-20 | Stop reason: HOSPADM

## 2020-11-18 RX ORDER — DEXTROSE MONOHYDRATE 25 G/50ML
50 INJECTION, SOLUTION INTRAVENOUS
Status: DISCONTINUED | OUTPATIENT
Start: 2020-11-18 | End: 2020-11-20 | Stop reason: HOSPADM

## 2020-11-18 RX ORDER — AZITHROMYCIN 500 MG/1
500 INJECTION, POWDER, LYOPHILIZED, FOR SOLUTION INTRAVENOUS ONCE
Status: COMPLETED | OUTPATIENT
Start: 2020-11-18 | End: 2020-11-18

## 2020-11-18 RX ORDER — AMOXICILLIN 250 MG
2 CAPSULE ORAL 2 TIMES DAILY
Status: DISCONTINUED | OUTPATIENT
Start: 2020-11-18 | End: 2020-11-20 | Stop reason: HOSPADM

## 2020-11-18 RX ORDER — CARVEDILOL 6.25 MG/1
25 TABLET ORAL 2 TIMES DAILY WITH MEALS
Status: DISCONTINUED | OUTPATIENT
Start: 2020-11-19 | End: 2020-11-20 | Stop reason: HOSPADM

## 2020-11-18 RX ORDER — ATORVASTATIN CALCIUM 20 MG/1
20 TABLET, FILM COATED ORAL DAILY
Status: DISCONTINUED | OUTPATIENT
Start: 2020-11-18 | End: 2020-11-20 | Stop reason: HOSPADM

## 2020-11-18 RX ORDER — POLYETHYLENE GLYCOL 3350 17 G/17G
1 POWDER, FOR SOLUTION ORAL
Status: DISCONTINUED | OUTPATIENT
Start: 2020-11-18 | End: 2020-11-20 | Stop reason: HOSPADM

## 2020-11-18 RX ORDER — GUAIFENESIN/DEXTROMETHORPHAN 100-10MG/5
10 SYRUP ORAL EVERY 6 HOURS PRN
Status: DISCONTINUED | OUTPATIENT
Start: 2020-11-18 | End: 2020-11-20 | Stop reason: HOSPADM

## 2020-11-18 RX ADMIN — CEFTRIAXONE SODIUM 2 G: 2 INJECTION, POWDER, FOR SOLUTION INTRAMUSCULAR; INTRAVENOUS at 15:47

## 2020-11-18 RX ADMIN — AZITHROMYCIN DIHYDRATE 500 MG: 500 INJECTION, POWDER, LYOPHILIZED, FOR SOLUTION INTRAVENOUS at 16:20

## 2020-11-18 ASSESSMENT — FIBROSIS 4 INDEX: FIB4 SCORE: 3.01

## 2020-11-18 NOTE — ED NOTES
Med rec updated and complete  Allergies reviewed  Interviewed pt with granddaughter at bedside with permission from pt.  Pts granddaughter had RX bottles at bedside, went over RX bottles and returned RX bottles back to pts granddaughter.  Pt reports that she has not taken her HYDRALAZINE 25MG in the last 8 days, because it upsets her stomach   Pt reports no antibiotics in the last 2 weeks

## 2020-11-18 NOTE — ED PROVIDER NOTES
"CHIEF COMPLAINT  Chief Complaint   Patient presents with   • Cough     x 1 month, reports has been coughing up \"a lot of Phelgm\"   • Abdominal Pain     x 1 month       HPI  Tere Glalegos is a 69 y.o. female with a history of end-stage renal disease on dialysis Monday Wednesday and Friday-she has not missed any appointments with dialysis and in fact went this morning.  She reports that she has had about 2 months of cough as well as chest pain with cough and left upper back pain.  She notes some occasional epigastric pain nausea and vomiting.  She does have some generalized weakness.  No definite fever up until Monday where she apparently had 1.  She has not been tested for Covid and does not know any definite Covid contacts.  She notes no diarrhea.  No definite shortness of breath.      REVIEW OF SYSTEMS  All other systems are negative.     PAST MEDICAL HISTORY  Past Medical History:   Diagnosis Date   • Acquired hypothyroidism 5/4/2020   • CAD (coronary artery disease)    • Chronic diastolic heart failure (HCC) 5/4/2020   • Coronary artery disease due to lipid rich plaque     2 Synergy NOEMI to 100% RCA stent placed   • Dental disorder     partial dentures- uppers   • Diabetes ([facility])     oral medication   • ESRD (end stage renal disease) on dialysis ([facility]) 5/4/2020   • Hemodialysis patient ([facility])     M, W, F   • Hyperlipidemia    • Hypertension    • Kidney transplant candidate    • Presence of drug-eluting stent in right coronary artery    • QT prolongation 1/22/2020   • RLS (restless legs syndrome) 8/5/2016   • Transaminitis 12/22/2018       FAMILY HISTORY  Family History   Problem Relation Age of Onset   • Diabetes Sister    • Other Sister         liver disease   • Diabetes Brother    • Heart Disease Neg Hx        SOCIAL HISTORY  Social History     Socioeconomic History   • Marital status:      Spouse name: Not on file   • Number of children: Not on file   • Years of education: Not on file   • " "Highest education level: Not on file   Occupational History   • Not on file   Social Needs   • Financial resource strain: Not on file   • Food insecurity     Worry: Not on file     Inability: Not on file   • Transportation needs     Medical: Not on file     Non-medical: Not on file   Tobacco Use   • Smoking status: Never Smoker   • Smokeless tobacco: Never Used   Substance and Sexual Activity   • Alcohol use: No     Alcohol/week: 0.0 oz   • Drug use: No   • Sexual activity: Never   Lifestyle   • Physical activity     Days per week: Not on file     Minutes per session: Not on file   • Stress: Not on file   Relationships   • Social connections     Talks on phone: Not on file     Gets together: Not on file     Attends Christian service: Not on file     Active member of club or organization: Not on file     Attends meetings of clubs or organizations: Not on file     Relationship status: Not on file   • Intimate partner violence     Fear of current or ex partner: Not on file     Emotionally abused: Not on file     Physically abused: Not on file     Forced sexual activity: Not on file   Other Topics Concern   • Not on file   Social History Narrative   • Not on file       SURGICAL HISTORY  Past Surgical History:   Procedure Laterality Date   • ZZZ CARDIAC CATH  9/7/2016    RCA stented with 2 Synergy drug-eluting stents.   • RECOVERY  8/16/2016    Procedure: CATH LAB [facility] WITH POSSIBLE DR. CASTILLO;  Surgeon: [facility];  [place] [facility] [place]  Service:    • ZZZ CARDIAC CATH  8/16/16    100% RCA   • OTHER Left 2014    left arm upper extremity fistula   • OTHER ABDOMINAL SURGERY      left kidney removed due to cancer       CURRENT MEDICATIONS  Home Medications    **Home medications have not yet been reviewed for this encounter**         ALLERGIES  No Known Allergies    PHYSICAL EXAM  VITAL SIGNS: /54   Pulse 64   Temp 36.4 °C (97.5 °F) (Temporal)   Resp 20   Ht 1.575 m (5' 2\")   Wt " 59.5 kg (131 lb 2.8 oz)   LMP  (LMP Unknown)   SpO2 96%   BMI 23.99 kg/m²      Constitutional: Well developed, Well nourished, No acute distress, Non-toxic appearance.   HENT: Normocephalic, Atraumatic, mucous membranes moist, no erythema, exudates, swelling, or masses, nares patent  Eyes: nonicteric  Neck: Supple, no meningismus  Lymphatic: No lymphadenopathy noted.   Cardiovascular: Regular rate and rhythm, no gallops rubs or murmurs  Lungs: Clear bilaterally   Abdomen: Bowel sounds normal, Soft, No tenderness, No pulsatile masses.   Skin: Warm, Dry, no rash  Back: No tenderness, No CVA tenderness.   Genitalia: Deferred  Rectal: Deferred  Extremities: No edema  Neurologic: Alert, appropriate, follows commands, moving all extremities, normal speech   Psychiatric: Affect normal    EKG  Time is 1428, rate 67, sinus rhythm, T wave changes laterally which are new from prior EKG, intervals normal, axis normal    RADIOLOGY/PROCEDURES  DX-CHEST-PORTABLE (1 VIEW)   Final Result      New right lung consolidation is compatible with Covid 19 pneumonia        Results for orders placed or performed during the hospital encounter of 11/18/20   CBC WITH DIFFERENTIAL   Result Value Ref Range    WBC 2.0 (LL) 4.8 - 10.8 K/uL    RBC 3.06 (L) 4.20 - 5.40 M/uL    Hemoglobin 9.2 (L) 12.0 - 16.0 g/dL    Hematocrit 28.6 (L) 37.0 - 47.0 %    MCV 93.5 81.4 - 97.8 fL    MCH 30.1 27.0 - 33.0 pg    MCHC 32.2 (L) 33.6 - 35.0 g/dL    RDW 48.5 35.9 - 50.0 fL    Platelet Count 102 (L) 164 - 446 K/uL    MPV 11.2 9.0 - 12.9 fL    Neutrophils-Polys 80.90 (H) 44.00 - 72.00 %    Lymphocytes 10.30 (L) 22.00 - 41.00 %    Monocytes 7.80 0.00 - 13.40 %    Eosinophils 0.00 0.00 - 6.90 %    Basophils 0.50 0.00 - 1.80 %    Immature Granulocytes 0.50 0.00 - 0.90 %    Nucleated RBC 0.00 /100 WBC    Neutrophils (Absolute) 1.65 (L) 2.00 - 7.15 K/uL    Lymphs (Absolute) 0.21 (L) 1.00 - 4.80 K/uL    Monos (Absolute) 0.16 0.00 - 0.85 K/uL    Eos (Absolute) 0.00  0.00 - 0.51 K/uL    Baso (Absolute) 0.01 0.00 - 0.12 K/uL    Immature Granulocytes (abs) 0.01 0.00 - 0.11 K/uL    NRBC (Absolute) 0.00 K/uL   COMP METABOLIC PANEL   Result Value Ref Range    Sodium 135 135 - 145 mmol/L    Potassium 4.4 3.6 - 5.5 mmol/L    Chloride 91 (L) 96 - 112 mmol/L    Co2 29 20 - 33 mmol/L    Anion Gap 15.0 7.0 - 16.0    Glucose 101 (H) 65 - 99 mg/dL    Bun 15 8 - 22 mg/dL    Creatinine 4.25 (H) 0.50 - 1.40 mg/dL    Calcium 9.4 8.4 - 10.2 mg/dL    AST(SGOT) 33 12 - 45 U/L    ALT(SGPT) 20 2 - 50 U/L    Alkaline Phosphatase 81 30 - 99 U/L    Total Bilirubin 0.4 0.1 - 1.5 mg/dL    Albumin 4.0 3.2 - 4.9 g/dL    Total Protein 7.7 6.0 - 8.2 g/dL    Globulin 3.7 (H) 1.9 - 3.5 g/dL    A-G Ratio 1.1 g/dL   LIPASE   Result Value Ref Range    Lipase 74 (H) 7 - 58 U/L   TROPONIN   Result Value Ref Range    Troponin T 67 (H) 6 - 19 ng/L   proBrain Natriuretic Peptide, NT   Result Value Ref Range    NT-proBNP 43335 (H) 0 - 125 pg/mL   COVID/SARS CoV-2 PCR    Specimen: Nasopharyngeal; Respirate   Result Value Ref Range    COVID Order Status Received    ESTIMATED GFR   Result Value Ref Range    GFR If  12 (A) >60 mL/min/1.73 m 2    GFR If Non African American 10 (A) >60 mL/min/1.73 m 2   SARS-CoV-2, PCR (In-House)   Result Value Ref Range    SARS-CoV-2 Source NP Wash/Asp    LACTIC ACID   Result Value Ref Range    Lactic Acid 1.1 0.5 - 2.0 mmol/L   EKG (NOW)   Result Value Ref Range    Report       [facility]    Test Date:  2020  Pt Name:    DIOR STAPLESROMEL    Department: [facility]  MRN:        [id number]                      Room:       -ROOM 12  Gender:     Female                       Technician: [id number]  :        [id number]                   Requested By:CAROL ANN AYALA  Order #:    [id number]                    Reading MD:    Measurements  Intervals                                Axis  Rate:       67                           P:          8  AZ:          160                          QRS:        34  QRSD:       78                           T:          151  QT:         432  QTc:        456    Interpretive Statements  SINUS RHYTHM  ABNORMAL T, CONSIDER ISCHEMIA, LATERAL LEADS  Compared to ECG 01/22/2020 12:53:06  T-wave abnormality now present  Possible ischemia now present  Prolonged QT interval no longer present         COURSE & MEDICAL DECISION MAKING  Pertinent Labs & Imaging studies reviewed. (See chart for details)  This is a 69-year-old who presents with cough weakness and some left upper back pain.  The patient does have new EKG findings that demonstrate lateral T wave inversions.  Troponin is 67.  The patient also appears to have consolidation on the right side consistent with a pneumonia which could be Covid related-Covid is pending.  However, the patient also appears to be leukopenic and does have a neutrophilia on her differential for CBC-I am covering for bacterial pneumonia.  Given the localized finding, the patient did receive antibiotics to cover her for this.  She will be admitted on telemetry for rule out of acute coronary syndrome.  At this time I have a low suspicion of PE.    FINAL IMPRESSION  1.  Pneumonia, rule out COVID-19  2.  EKG abnormalities  3.  Elevated troponin         Electronically signed by: Ben Chaudhari M.D., 11/18/2020 2:17 PM

## 2020-11-18 NOTE — ED TRIAGE NOTES
"Chief Complaint   Patient presents with   • Cough     x 1 month, reports has been coughing up \"a lot of Phelgm\"   • Abdominal Pain     x 1 month     /55   Pulse 80   Temp 36.1 °C (97 °F) (Temporal)   Resp (!) 22   Ht 1.575 m (5' 2\")   Wt 59.5 kg (131 lb 2.8 oz)   LMP  (LMP Unknown)   SpO2 95%   BMI 23.99 kg/m²     Pt to ED s/p HD this am for c/o increasing productive cough and abd pain.      Pt denies known Covid Exposure     Ronny #[id number]  "

## 2020-11-18 NOTE — ED NOTES
Jason from Lab called with critical result of WBC=2.0 at 1417. Critical lab result read back to Jason.   Dr. Chaudhari notified of critical lab result at 1427.  Critical lab result read back by Dr. Chaudhari.

## 2020-11-19 PROBLEM — U07.1 COVID-19: Status: ACTIVE | Noted: 2020-11-19

## 2020-11-19 PROBLEM — R79.89 ELEVATED TROPONIN: Status: RESOLVED | Noted: 2018-12-22 | Resolved: 2020-11-19

## 2020-11-19 PROBLEM — U07.1 PNEUMONIA DUE TO COVID-19 VIRUS: Status: ACTIVE | Noted: 2020-11-18

## 2020-11-19 PROBLEM — J12.82 PNEUMONIA DUE TO COVID-19 VIRUS: Status: ACTIVE | Noted: 2020-11-18

## 2020-11-19 PROBLEM — J96.01 ACUTE HYPOXEMIC RESPIRATORY FAILURE DUE TO COVID-19 (HCC): Status: ACTIVE | Noted: 2020-11-19

## 2020-11-19 LAB
25(OH)D3 SERPL-MCNC: 37 NG/ML (ref 30–100)
ALBUMIN SERPL BCP-MCNC: 3.4 G/DL (ref 3.2–4.9)
ALBUMIN/GLOB SERPL: 1.2 G/DL
ALP SERPL-CCNC: 63 U/L (ref 30–99)
ALT SERPL-CCNC: 16 U/L (ref 2–50)
ANION GAP SERPL CALC-SCNC: 17 MMOL/L (ref 7–16)
AST SERPL-CCNC: 31 U/L (ref 12–45)
BILIRUB SERPL-MCNC: 0.2 MG/DL (ref 0.1–1.5)
BUN SERPL-MCNC: 26 MG/DL (ref 8–22)
CALCIUM SERPL-MCNC: 8.7 MG/DL (ref 8.4–10.2)
CHLORIDE SERPL-SCNC: 92 MMOL/L (ref 96–112)
CO2 SERPL-SCNC: 24 MMOL/L (ref 20–33)
CREAT SERPL-MCNC: 5.83 MG/DL (ref 0.5–1.4)
CRP SERPL HS-MCNC: 2.92 MG/DL (ref 0–0.75)
D DIMER PPP IA.FEU-MCNC: 1.25 UG/ML (FEU) (ref 0–0.5)
ERYTHROCYTE [DISTWIDTH] IN BLOOD BY AUTOMATED COUNT: 50.7 FL (ref 35.9–50)
GLOBULIN SER CALC-MCNC: 2.9 G/DL (ref 1.9–3.5)
GLUCOSE BLD-MCNC: 172 MG/DL (ref 65–99)
GLUCOSE BLD-MCNC: 80 MG/DL (ref 65–99)
GLUCOSE BLD-MCNC: 88 MG/DL (ref 65–99)
GLUCOSE BLD-MCNC: 97 MG/DL (ref 65–99)
GLUCOSE SERPL-MCNC: 79 MG/DL (ref 65–99)
HCT VFR BLD AUTO: 26.4 % (ref 37–47)
HGB BLD-MCNC: 8.1 G/DL (ref 12–16)
MCH RBC QN AUTO: 29.8 PG (ref 27–33)
MCHC RBC AUTO-ENTMCNC: 30.7 G/DL (ref 33.6–35)
MCV RBC AUTO: 97.1 FL (ref 81.4–97.8)
PLATELET # BLD AUTO: 92 K/UL (ref 164–446)
PMV BLD AUTO: 10.5 FL (ref 9–12.9)
POTASSIUM SERPL-SCNC: 4.6 MMOL/L (ref 3.6–5.5)
PROCALCITONIN SERPL-MCNC: 0.63 NG/ML
PROT SERPL-MCNC: 6.3 G/DL (ref 6–8.2)
RBC # BLD AUTO: 2.72 M/UL (ref 4.2–5.4)
SODIUM SERPL-SCNC: 133 MMOL/L (ref 135–145)
WBC # BLD AUTO: 1.4 K/UL (ref 4.8–10.8)

## 2020-11-19 PROCEDURE — 85027 COMPLETE CBC AUTOMATED: CPT

## 2020-11-19 PROCEDURE — 83520 IMMUNOASSAY QUANT NOS NONAB: CPT

## 2020-11-19 PROCEDURE — G0378 HOSPITAL OBSERVATION PER HR: HCPCS

## 2020-11-19 PROCEDURE — 99226 PR SUBSEQUENT OBSERVATION CARE,LEVEL III: CPT | Performed by: HOSPITALIST

## 2020-11-19 PROCEDURE — A9270 NON-COVERED ITEM OR SERVICE: HCPCS | Performed by: HOSPITALIST

## 2020-11-19 PROCEDURE — 700102 HCHG RX REV CODE 250 W/ 637 OVERRIDE(OP): Performed by: HOSPITALIST

## 2020-11-19 PROCEDURE — 700111 HCHG RX REV CODE 636 W/ 250 OVERRIDE (IP): Performed by: INTERNAL MEDICINE

## 2020-11-19 PROCEDURE — 96375 TX/PRO/DX INJ NEW DRUG ADDON: CPT

## 2020-11-19 PROCEDURE — 84145 PROCALCITONIN (PCT): CPT

## 2020-11-19 PROCEDURE — A9270 NON-COVERED ITEM OR SERVICE: HCPCS | Performed by: INTERNAL MEDICINE

## 2020-11-19 PROCEDURE — 96372 THER/PROPH/DIAG INJ SC/IM: CPT

## 2020-11-19 PROCEDURE — 82306 VITAMIN D 25 HYDROXY: CPT

## 2020-11-19 PROCEDURE — 700102 HCHG RX REV CODE 250 W/ 637 OVERRIDE(OP): Performed by: INTERNAL MEDICINE

## 2020-11-19 PROCEDURE — 80053 COMPREHEN METABOLIC PANEL: CPT

## 2020-11-19 PROCEDURE — 700105 HCHG RX REV CODE 258: Performed by: INTERNAL MEDICINE

## 2020-11-19 PROCEDURE — 82962 GLUCOSE BLOOD TEST: CPT | Mod: 91

## 2020-11-19 RX ORDER — ACETAMINOPHEN 160 MG
1 TABLET,DISINTEGRATING ORAL DAILY
Qty: 30 CAP | Refills: 3 | Status: SHIPPED | OUTPATIENT
Start: 2020-11-19 | End: 2021-08-03

## 2020-11-19 RX ORDER — ASCORBIC ACID 500 MG
500 TABLET ORAL DAILY
Qty: 30 TAB | Refills: 3 | Status: SHIPPED | OUTPATIENT
Start: 2020-11-19 | End: 2021-05-31

## 2020-11-19 RX ORDER — LISINOPRIL 40 MG/1
40 TABLET ORAL
Qty: 30 TAB | Refills: 3 | Status: SHIPPED | OUTPATIENT
Start: 2020-11-20 | End: 2021-05-04

## 2020-11-19 RX ORDER — ROPINIROLE 1 MG/1
1 TABLET, FILM COATED ORAL
Status: DISCONTINUED | OUTPATIENT
Start: 2020-11-20 | End: 2020-11-20 | Stop reason: HOSPADM

## 2020-11-19 RX ORDER — BUDESONIDE 0.5 MG/2ML
0.5 INHALANT ORAL
Status: DISCONTINUED | OUTPATIENT
Start: 2020-11-19 | End: 2020-11-19

## 2020-11-19 RX ORDER — FLUTICASONE PROPIONATE 110 UG/1
2 AEROSOL, METERED RESPIRATORY (INHALATION) EVERY 12 HOURS
Qty: 1 EACH | Refills: 0 | Status: SHIPPED | OUTPATIENT
Start: 2020-11-19 | End: 2021-05-03

## 2020-11-19 RX ORDER — FLUTICASONE PROPIONATE 110 UG/1
2 AEROSOL, METERED RESPIRATORY (INHALATION) EVERY 12 HOURS
Status: DISCONTINUED | OUTPATIENT
Start: 2020-11-19 | End: 2020-11-20 | Stop reason: HOSPADM

## 2020-11-19 RX ORDER — LISINOPRIL 20 MG/1
40 TABLET ORAL
Status: DISCONTINUED | OUTPATIENT
Start: 2020-11-20 | End: 2020-11-20 | Stop reason: HOSPADM

## 2020-11-19 RX ORDER — FLUTICASONE PROPIONATE 110 UG/1
2 AEROSOL, METERED RESPIRATORY (INHALATION)
Status: DISCONTINUED | OUTPATIENT
Start: 2020-11-19 | End: 2020-11-19

## 2020-11-19 RX ADMIN — AMLODIPINE BESYLATE 10 MG: 5 TABLET ORAL at 05:39

## 2020-11-19 RX ADMIN — HEPARIN SODIUM 5000 UNITS: 5000 INJECTION, SOLUTION INTRAVENOUS; SUBCUTANEOUS at 21:19

## 2020-11-19 RX ADMIN — FLUTICASONE PROPIONATE 220 MCG: 110 AEROSOL, METERED RESPIRATORY (INHALATION) at 21:19

## 2020-11-19 RX ADMIN — CEFTRIAXONE SODIUM 2 G: 2 INJECTION, POWDER, FOR SOLUTION INTRAMUSCULAR; INTRAVENOUS at 05:40

## 2020-11-19 RX ADMIN — HEPARIN SODIUM 5000 UNITS: 5000 INJECTION, SOLUTION INTRAVENOUS; SUBCUTANEOUS at 05:39

## 2020-11-19 RX ADMIN — CARVEDILOL 25 MG: 6.25 TABLET, FILM COATED ORAL at 08:30

## 2020-11-19 RX ADMIN — GUAIFENESIN AND DEXTROMETHORPHAN 10 ML: 100; 10 SYRUP ORAL at 08:32

## 2020-11-19 RX ADMIN — HYDRALAZINE HYDROCHLORIDE 100 MG: 25 TABLET, FILM COATED ORAL at 05:38

## 2020-11-19 RX ADMIN — ATORVASTATIN CALCIUM 20 MG: 20 TABLET, FILM COATED ORAL at 05:38

## 2020-11-19 RX ADMIN — ROPINIROLE HYDROCHLORIDE 2 MG: 1 TABLET, FILM COATED ORAL at 21:19

## 2020-11-19 RX ADMIN — FUROSEMIDE 40 MG: 40 TABLET ORAL at 05:40

## 2020-11-19 RX ADMIN — HEPARIN SODIUM 5000 UNITS: 5000 INJECTION, SOLUTION INTRAVENOUS; SUBCUTANEOUS at 16:11

## 2020-11-19 RX ADMIN — LEVOTHYROXINE SODIUM 50 MCG: 0.05 TABLET ORAL at 05:45

## 2020-11-19 RX ADMIN — ASPIRIN 81 MG CHEWABLE TABLET 81 MG: 81 TABLET CHEWABLE at 05:39

## 2020-11-19 RX ADMIN — INSULIN HUMAN 1 UNITS: 100 INJECTION, SOLUTION PARENTERAL at 20:32

## 2020-11-19 RX ADMIN — DOXYCYCLINE 100 MG: 100 TABLET, FILM COATED ORAL at 05:39

## 2020-11-19 ASSESSMENT — ENCOUNTER SYMPTOMS
NECK PAIN: 0
BACK PAIN: 0
MYALGIAS: 0
DIARRHEA: 0
EYE REDNESS: 0
FOCAL WEAKNESS: 0
PALPITATIONS: 0
SHORTNESS OF BREATH: 1
NERVOUS/ANXIOUS: 0
BLURRED VISION: 0
NAUSEA: 0
DEPRESSION: 0
HEARTBURN: 0
EYE PAIN: 0
INSOMNIA: 0
EYE DISCHARGE: 0
SPUTUM PRODUCTION: 0
VOMITING: 0
FEVER: 0
HEADACHES: 0
CHILLS: 0
ABDOMINAL PAIN: 0
COUGH: 1
DIZZINESS: 0
SEIZURES: 0
STRIDOR: 0
WEIGHT LOSS: 0
ORTHOPNEA: 0

## 2020-11-19 ASSESSMENT — PAIN DESCRIPTION - PAIN TYPE: TYPE: CHRONIC PAIN

## 2020-11-19 NOTE — DISCHARGE PLANNING
Outpatient Dialysis Note:     This DC spoke with patients son Hayden and daughter Catie Huitron and relayed information regarding HD placement, HD chair time schedule, address and phone number to the clinic. I also spoke with Ana Maria ROBLES CM at ext [phone] and relayed this information.    Vicki Rodriguez- Dialysis Coordinator  [facility]- [phone]

## 2020-11-19 NOTE — DISCHARGE PLANNING
Outpatient Dialysis Note    Due to Covid 19 positive result pt [facility]  [address] [place] [address]    Schedule: Tuesday, Thursday, Saturday  Time: 11:30am      Confirmed patient is active at:    [facility]  [address]  [place] [address]    Schedule: Monday, Wednesday, Friday   Time: 05:30am     Patient is seen by Dr. Villar in HD clinic.    Spoke with Kendy at facility who confirmed.    Forwarded records for review.    Vicki Rodriguez- Dialysis Coordinator  [facility] Ph# [phone]

## 2020-11-19 NOTE — DISCHARGE SUMMARY
"Discharge Summary    CHIEF COMPLAINT ON ADMISSION  Chief Complaint   Patient presents with   • Cough     x 1 month, reports has been coughing up \"a lot of Phelgm\"   • Abdominal Pain     x 1 month       Reason for Admission  Back Pain, Cough, Congestion, hypoxia    Admission Date  11/18/2020    CODE STATUS  Full Code    HPI & HOSPITAL COURSE  This is a 69 y.o. female here with SOB, COVID 10 pneumonia.69 y.o. female with PMH of ESRD on HD, HTN, DM, hypothyroid who presented 11/18/2020 with cough and SOB for 1 month.  And is been getting worse over the past few days.  CXR showed consolidation.  Covid: was positive.  She will be admitted for further management.  She is Greek-speaking only.  Bedside RN translated, speaks fluent Greek.  Patient is walking, eating well, desaturated with ambulation only to 85%, sent home with 2 LPM NC home O2.  Her blood pressure was low, therefore held her norvasc, hydralazine.  Vitamin D level pending, but started on vit D and C, flovent inhaler bid.  She was provided information for COVID 19 HD unit to start TTS schedule arranged by Dr. Villar.  Quarantine for 14 days.      Therefore, she is discharged in good and stable condition to home with close outpatient follow-up.    The patient recovered much more quickly than anticipated on admission.    Discharge Date  11/19/20    FOLLOW UP ITEMS POST DISCHARGE  Follow up with PCP in 14 days for covid 19 pneumonia recheck.    DISCHARGE DIAGNOSES  Active Problems:    Essential hypertension POA: Yes    Controlled type 2 diabetes mellitus with chronic kidney disease on chronic dialysis, without long-term current use of insulin (HCC) POA: Yes    ESRD (end stage renal disease) on dialysis (HCC) POA: Yes    Pneumonia due to COVID-19 virus POA: Yes    Pancytopenia (HCC) POA: Yes    Acute hypoxemic respiratory failure due to COVID-19 (HCC) POA: Yes  Resolved Problems:    Elevated troponin POA: Yes      FOLLOW UP  Future Appointments   Date Time " Provider Department [place]   12/7/2020  2:00 PM Ritesh Borjas M.D. [facility] None   1/22/2021  4:20 PM Jimmy Bloom M.D. [facility]   1/28/2021  4:40 PM ANGELITA [facility]     No follow-up provider specified.    MEDICATIONS ON DISCHARGE     Medication List      START taking these medications      Instructions   ascorbic acid 500 MG tablet  Commonly known as: VITAMIN C   Take 1 Tab by mouth every day.  Dose: 500 mg     fluticasone 110 MCG/ACT Aero  Commonly known as: FLOVENT HFA   Inhale 2 Puffs every 12 hours.  Dose: 2 Puff     Vitamin D3 2000 UNIT Caps   Take 1 Cap by mouth every day.  Dose: 1 Cap        CHANGE how you take these medications      Instructions   aspirin 81 MG Chew chewable tablet  What changed: See the new instructions.  Commonly known as: ASA   Doctor's comments: PLEASE REFILL  TOME FERNANDO TABLETA TODOS LOS MCCORMICK NOT COVERED OTC     atorvastatin 20 MG Tabs  What changed: when to take this  Commonly known as: LIPITOR   Take 1 Tab by mouth every day for 90 days.  Dose: 20 mg     lisinopril 40 MG tablet  What changed: when to take this  Commonly known as: PRINIVIL   Take 1 Tab by mouth every Monday, Wednesday, and Friday.  Dose: 40 mg     ROPINIRole 1 MG Tabs  What changed:   · how much to take  · how to take this  · when to take this  · additional instructions  Commonly known as: REQUIP   Take one tablet before dialysis, take 2 tablets before bed.        CONTINUE taking these medications      Instructions   carvedilol 25 MG Tabs  Commonly known as: COREG   Take 1 Tab by mouth 2 times a day, with meals.  Dose: 25 mg     furosemide 40 MG Tabs  Commonly known as: LASIX   Take 1 Tab by mouth every day. Hold for SBP less than 100  Dose: 40 mg     gabapentin 100 MG Caps  Commonly known as: NEURONTIN   TAKE 1 CAP BY MOUTH DAILY FOR 5 DAYS, 1 CAP TWICE DAILY X 5 DAYS, THEN 1 CAP 3X DAILY IF NOT DROWSY     hydrOXYzine HCl 25 MG Tabs  Commonly known as: ATARAX   TOME FERNANDO TABLETA  POR VIA ORAL AL ACOSTARSE CUANDO SEA NECESARIO FOR ITCHING     levothyroxine 50 MCG Tabs  Commonly known as: SYNTHROID   Take 1 Tab by mouth Every morning on an empty stomach for 90 days.  Dose: 50 mcg     pioglitazone 30 MG Tabs  Commonly known as: ACTOS   Take 1 Tab by mouth every day for 90 days.  Dose: 30 mg        STOP taking these medications    amLODIPine 10 MG Tabs  Commonly known as: NORVASC     hydrALAZINE 100 MG tablet  Commonly known as: APRESOLINE            Allergies  No Known Allergies    DIET  Orders Placed This Encounter   Procedures   • Diet Order Diet: Consistent CHO (Diabetic)     Standing Status:   Standing     Number of Occurrences:   1     Order Specific Question:   Diet:     Answer:   Consistent CHO (Diabetic) [4]       ACTIVITY  As tolerated.  Weight bearing as tolerated    CONSULTATIONS  Dr. Villar    PROCEDURES  11/18/2020 2:27 PM     HISTORY/REASON FOR EXAM: Cough. Short of breath. Possible Covid infection     TECHNIQUE/EXAM DESCRIPTION AND NUMBER OF VIEWS:  Single AP view of the chest.     COMPARISON: 1/22/2020     FINDINGS:  Lungs: Some patchy peripheral consolidation is seen in the right mid and lower lung zones     Pleura:  No pleural space process is seen.     Heart and mediastinum: There is stable cardiac silhouette enlargement.     IMPRESSION:     New right lung consolidation is compatible with Covid 19 pneumonia    LABORATORY  Lab Results   Component Value Date    SODIUM 133 (L) 11/19/2020    POTASSIUM 4.6 11/19/2020    CHLORIDE 92 (L) 11/19/2020    CO2 24 11/19/2020    GLUCOSE 79 11/19/2020    BUN 26 (H) 11/19/2020    CREATININE 5.83 (HH) 11/19/2020        Lab Results   Component Value Date    WBC 1.4 (LL) 11/19/2020    HEMOGLOBIN 8.1 (L) 11/19/2020    HEMATOCRIT 26.4 (L) 11/19/2020    PLATELETCT 92 (L) 11/19/2020        Total time of the discharge process exceeds 39 minutes.

## 2020-11-19 NOTE — ASSESSMENT & PLAN NOTE
"Droplet precautions  Procalcitonin negative, dc'd ceftriaxone and doxycycline  D-dimer negative  CXR c/w COVID 19 infiltrate, personally reviewed, no obvious \"consolidation\" seen in RLL as reported by radiologist.  Oxygen for home arranged, requiring 2 LPM NC  Vitamin C   Vitamin D   steroid inhaler ordered  Diabetic and minimal oxygen needs, no decadron ordered.  "

## 2020-11-19 NOTE — PROGRESS NOTES
Night Hospitalist updated regarding critical result of WBC 1.4 from 2.0. Continue to monitor patient.

## 2020-11-19 NOTE — H&P
"Hospital Medicine History & Physical Note    Date of Service  11/18/2020    Primary Care Physician  Kathy Melgar, MALGORZATA.    Consultants  none    Code Status  Prior    Chief Complaint  Chief Complaint   Patient presents with   • Cough     x 1 month, reports has been coughing up \"a lot of Phelgm\"   • Abdominal Pain     x 1 month       History of Presenting Illness  69 y.o. female with PMH of ESRD on HD, HTN, DM, hypothyroid who presented 11/18/2020 with cough and SOB for 1 month.  And is been getting worse over the past few days.  CXR showed consolidation.  Covid: was positive.  She will be admitted for further management.  She is Andorran-speaking only    Review of Systems  Review of Systems   Constitutional: Negative for chills, fever and weight loss.   HENT: Negative for congestion and nosebleeds.    Eyes: Negative for blurred vision, pain, discharge and redness.   Respiratory: Positive for cough and shortness of breath. Negative for sputum production and stridor.    Cardiovascular: Negative for chest pain, palpitations and orthopnea.   Gastrointestinal: Negative for abdominal pain, diarrhea, heartburn, nausea and vomiting.   Genitourinary: Negative for dysuria, frequency and urgency.   Musculoskeletal: Negative for back pain, myalgias and neck pain.   Skin: Negative for itching and rash.   Neurological: Negative for dizziness, focal weakness, seizures and headaches.   Psychiatric/Behavioral: Negative for depression. The patient is not nervous/anxious and does not have insomnia.        Past Medical History   has a past medical history of Acquired hypothyroidism (5/4/2020), CAD (coronary artery disease), Chronic diastolic heart failure (HCC) (5/4/2020), Coronary artery disease due to lipid rich plaque, Dental disorder, Diabetes ([facility]), ESRD (end stage renal disease) on dialysis ([facility]) (5/4/2020), Hemodialysis patient ([facility]), Hyperlipidemia, Hypertension, Kidney transplant candidate, Presence of drug-eluting stent " in right coronary artery, QT prolongation (1/22/2020), RLS (restless legs syndrome) (8/5/2016), and Transaminitis (12/22/2018).    Surgical History   has a past surgical history that includes recovery (8/16/2016); other abdominal surgery; other (Left, 2014); zzz cardiac cath (8/16/16); and zzz cardiac cath (9/7/2016).     Family History  family history includes Diabetes in her brother and sister; Other in her sister.     Social History   reports that she has never smoked. She has never used smokeless tobacco. She reports that she does not drink alcohol or use drugs.    Allergies  No Known Allergies    Medications  Prior to Admission Medications   Prescriptions Last Dose Informant Patient Reported? Taking?   ROPINIRole (REQUIP) 1 MG Tab 11/17/2020 at 2000 Rx Bottle (For Med Information) No No   Sig: Take one tablet before dialysis, take 2 tablets before bed.   Patient taking differently: Take 1-2 mg by mouth See Admin Instructions. Take one tablet before dialysis (Mon, Wed, and Friday) , take 2 tablets before bed.   amLODIPine (NORVASC) 10 MG Tab 11/17/2020 at 0800 Rx Bottle (For Med Information) No No   Sig: TOME FERNANDO TABLETA TODOS LOS MCCORMICK   Patient taking differently: Take 10 mg by mouth every day.   aspirin (ASA) 81 MG Chew Tab chewable tablet 11/17/2020 at 0800 Rx Bottle (For Med Information) No No   Sig: TOME FERNANDO TABLETA TODOS LOS MCCORMICK NOT COVERED OTC   Patient taking differently: Chew 81 mg every day.   atorvastatin (LIPITOR) 20 MG Tab 11/17/2020 at 2000 Rx Bottle (For Med Information) No No   Sig: Take 1 Tab by mouth every day for 90 days.   Patient taking differently: Take 20 mg by mouth every evening.   carvedilol (COREG) 25 MG Tab 11/17/2020 at 2000 Rx Bottle (For Med Information) No No   Sig: Take 1 Tab by mouth 2 times a day, with meals.   furosemide (LASIX) 40 MG Tab 11/17/2020 at 0800 Rx Bottle (For Med Information) No No   Sig: Take 1 Tab by mouth every day. Hold for SBP less than 100   gabapentin  (NEURONTIN) 100 MG Cap Not Taking at Unknown time Rx Bottle (For Med Information) No No   Sig: TAKE 1 CAP BY MOUTH DAILY FOR 5 DAYS, 1 CAP TWICE DAILY X 5 DAYS, THEN 1 CAP 3X DAILY IF NOT DROWSY   Patient not taking: Reported on 11/18/2020   hydrALAZINE (APRESOLINE) 100 MG tablet > 8 days at Unknown Rx Bottle (For Med Information) No No   Sig: Take 1 Tab by mouth 3 times a day.   Patient taking differently: Take 100 mg by mouth 2 times a day.   hydrOXYzine HCl (ATARAX) 25 MG Tab Not Taking at Unknown time Patient No No   Sig: TOME FERNANDO TABLETA POR VIA ORAL AL ACOSTARSE CUANDO SEA NECESARIO FOR ITCHING   Patient not taking: Reported on 11/18/2020   levothyroxine (SYNTHROID) 50 MCG Tab 11/17/2020 at 0800 Rx Bottle (For Med Information) No No   Sig: Take 1 Tab by mouth Every morning on an empty stomach for 90 days.   lisinopril (PRINIVIL) 40 MG tablet 11/17/2020 at 0800 Rx Bottle (For Med Information) No No   Sig: Take 1 Tab by mouth 2 times a day.   pioglitazone (ACTOS) 30 MG Tab 11/17/2020 at 0800 Rx Bottle (For Med Information) No No   Sig: Take 1 Tab by mouth every day for 90 days.      Facility-Administered Medications: None       Physical Exam  Temp:  [36.1 °C (97 °F)-37.4 °C (99.3 °F)] 37.4 °C (99.3 °F)  Pulse:  [64-80] 64  Resp:  [16-22] 16  BP: (129-142)/(54-56) 141/56  SpO2:  [95 %-98 %] 98 %    Physical Exam  Vitals signs reviewed.   Constitutional:       General: She is not in acute distress.     Appearance: Normal appearance.   HENT:      Head: Normocephalic and atraumatic.      Nose: No congestion or rhinorrhea.   Eyes:      Extraocular Movements: Extraocular movements intact.      Pupils: Pupils are equal, round, and reactive to light.   Neck:      Musculoskeletal: Normal range of motion and neck supple.   Cardiovascular:      Rate and Rhythm: Normal rate and regular rhythm.      Pulses: Normal pulses.   Pulmonary:      Effort: Pulmonary effort is normal. No respiratory distress.      Breath sounds:  Normal breath sounds.   Abdominal:      General: Bowel sounds are normal. There is no distension.      Palpations: Abdomen is soft.      Tenderness: There is no abdominal tenderness.   Musculoskeletal:         General: No swelling or tenderness.   Skin:     General: Skin is warm.      Findings: No erythema.   Neurological:      General: No focal deficit present.      Mental Status: She is alert and oriented to person, place, and time.         Laboratory:  Recent Labs     11/18/20  1345   WBC 2.0*   RBC 3.06*   HEMOGLOBIN 9.2*   HEMATOCRIT 28.6*   MCV 93.5   MCH 30.1   MCHC 32.2*   RDW 48.5   PLATELETCT 102*   MPV 11.2     Recent Labs     11/18/20  1345   SODIUM 135   POTASSIUM 4.4   CHLORIDE 91*   CO2 29   GLUCOSE 101*   BUN 15   CREATININE 4.25*   CALCIUM 9.4     Recent Labs     11/18/20  1345   ALTSGPT 20   ASTSGOT 33   ALKPHOSPHAT 81   TBILIRUBIN 0.4   LIPASE 74*   GLUCOSE 101*         Recent Labs     11/18/20  1529   NTPROBNP 27611*         Recent Labs     11/18/20  1529   TROPONINT 67*       Imaging:  DX-CHEST-PORTABLE (1 VIEW)   Final Result      New right lung consolidation is compatible with Covid 19 pneumonia            Assessment/Plan:  I anticipate this patient will require at least two midnights for appropriate medical management, necessitating inpatient admission.    COVID-19- (present on admission)  Assessment & Plan  Droplet precautions  Order inflammatory markers  Empirically started on antibiotic with ceftriaxone and doxycycline  Consider ID consult in the morning    Pancytopenia (HCC)- (present on admission)  Assessment & Plan  Chronic  Follow cbc in am    Community acquired pneumonia of right lung- (present on admission)  Assessment & Plan  Started on IV ceftriaxone and doxy  covid      ESRD (end stage renal disease) on dialysis (HCC)- (present on admission)  Assessment & Plan  Had HD today  MWF schedule    Elevated troponin- (present on admission)  Assessment & Plan  Likely demand ischemia      Controlled type 2 diabetes mellitus with chronic kidney disease on chronic dialysis, without long-term current use of insulin (HCC)- (present on admission)  Assessment & Plan  SSI    Essential hypertension- (present on admission)  Assessment & Plan  Continue outpatient meds

## 2020-11-19 NOTE — FACE TO FACE
"Face to Face Note  -  Durable Medical Equipment    Ana Luisa Roth M.D. - NPI: [id number]  I certify that this patient is under my care and that they had a durable medical equipment(DME)face to face encounter by myself that meets the physician DME face-to-face encounter requirements with this patient on:    Date of encounter:   Patient:                    MRN:                       YOB: 2021  Tere Gallegos  [id number]  1950     The encounter with the patient was in whole, or in part, for the following medical condition, which is the primary reason for durable medical equipment:  Other - covid 19 pneumonia    I certify that, based on my findings, the following durable medical equipment is medically necessary:  Oxygen.    HOME O2 Saturation Measurements:(Values must be present for Home Oxygen orders)  Room air sat at rest: 87  Room air sat with amb: 84  With liters of O2: 2, O2 sat at rest with O2: 92  With Liters of O2: 2, O2 sat with amb with O2 : 90  Is the patient mobile?: Yes    My Clinical findings support the need for the above equipment due to:  Hypoxia    Supporting Symptoms: The patient requires supplemental oxygen, as the following interventions have been tried with limited or no improvement: \"Bronchodilators and/or steroid inhalers, \"Ambulation with oximetry and \"Incentive spirometry    "

## 2020-11-19 NOTE — PROGRESS NOTES
Received patient from the Emergency Department. Patient ambulated from the [facility] to the hospital bed with stand by assist. Patient is alert and oriented X 4. Safety precautions in place. Will continue to monitor patient.

## 2020-11-20 VITALS
DIASTOLIC BLOOD PRESSURE: 40 MMHG | HEART RATE: 50 BPM | BODY MASS INDEX: 24.14 KG/M2 | HEIGHT: 62 IN | RESPIRATION RATE: 18 BRPM | WEIGHT: 131.17 LBS | SYSTOLIC BLOOD PRESSURE: 100 MMHG | TEMPERATURE: 96.5 F | OXYGEN SATURATION: 100 %

## 2020-11-20 LAB
GLUCOSE BLD-MCNC: 136 MG/DL (ref 65–99)
GLUCOSE BLD-MCNC: 79 MG/DL (ref 65–99)
IL6 SERPL-MCNC: <2 PG/ML

## 2020-11-20 PROCEDURE — G0378 HOSPITAL OBSERVATION PER HR: HCPCS

## 2020-11-20 PROCEDURE — 700102 HCHG RX REV CODE 250 W/ 637 OVERRIDE(OP): Performed by: HOSPITALIST

## 2020-11-20 PROCEDURE — A9270 NON-COVERED ITEM OR SERVICE: HCPCS | Performed by: INTERNAL MEDICINE

## 2020-11-20 PROCEDURE — A9270 NON-COVERED ITEM OR SERVICE: HCPCS | Performed by: HOSPITALIST

## 2020-11-20 PROCEDURE — 99217 PR OBSERVATION CARE DISCHARGE: CPT | Performed by: HOSPITALIST

## 2020-11-20 PROCEDURE — 700102 HCHG RX REV CODE 250 W/ 637 OVERRIDE(OP): Performed by: INTERNAL MEDICINE

## 2020-11-20 PROCEDURE — 82962 GLUCOSE BLOOD TEST: CPT

## 2020-11-20 RX ADMIN — FUROSEMIDE 40 MG: 40 TABLET ORAL at 05:28

## 2020-11-20 RX ADMIN — FLUTICASONE PROPIONATE 220 MCG: 110 AEROSOL, METERED RESPIRATORY (INHALATION) at 05:28

## 2020-11-20 RX ADMIN — ASPIRIN 81 MG CHEWABLE TABLET 81 MG: 81 TABLET CHEWABLE at 05:27

## 2020-11-20 RX ADMIN — ATORVASTATIN CALCIUM 20 MG: 20 TABLET, FILM COATED ORAL at 05:31

## 2020-11-20 RX ADMIN — LEVOTHYROXINE SODIUM 50 MCG: 0.05 TABLET ORAL at 05:28

## 2020-11-20 RX ADMIN — ROPINIROLE HYDROCHLORIDE 1 MG: 1 TABLET, FILM COATED ORAL at 05:28

## 2020-11-20 RX ADMIN — CARVEDILOL 25 MG: 6.25 TABLET, FILM COATED ORAL at 09:34

## 2020-11-20 ASSESSMENT — ENCOUNTER SYMPTOMS
EYE DISCHARGE: 0
BRUISES/BLEEDS EASILY: 0
SPEECH CHANGE: 0
SHORTNESS OF BREATH: 1
PALPITATIONS: 0
WEAKNESS: 0
CONSTIPATION: 0
DIAPHORESIS: 0
EYE PAIN: 0
FEVER: 0
BACK PAIN: 0
MYALGIAS: 0
NECK PAIN: 0
DIARRHEA: 0
HEMOPTYSIS: 0
VOMITING: 0
CLAUDICATION: 0
WHEEZING: 0
LOSS OF CONSCIOUSNESS: 0
ABDOMINAL PAIN: 0
NAUSEA: 0
FOCAL WEAKNESS: 0
SENSORY CHANGE: 0
DIZZINESS: 0
COUGH: 0
HEADACHES: 0
SORE THROAT: 0
CHILLS: 0
SPUTUM PRODUCTION: 0
DEPRESSION: 0

## 2020-11-20 ASSESSMENT — LIFESTYLE VARIABLES: SUBSTANCE_ABUSE: 0

## 2020-11-20 NOTE — DISCHARGE PLANNING
Charge RN verified that pt does not have a preference on which DME company is used for Home O2. DME choice with preferred selected, faxed to [employer].

## 2020-11-20 NOTE — PROGRESS NOTES
Assumed day shift care at start of shift  Patient a+o x 4, Solomon Islander speaking on only - ipda  utlized, denies pain  Ls= diminished, oxygen saturation 96% on 1lpm via nc, patient ambulated in hallway without oxygen and oxygen saturation when walk was completed = 97%, patient desats with sleep = oxygen saturation down to 86% - patient placed back on 1lpm via nc and oxygen increased to 97%, moist/productive cough, denies corona  No needs at this time, wctm     Fall precautions/hourly rounding maintained, call light within reach and functioning, all items within reach.  Patient encouraged to call for assistance, poc reviewed with patient, ?'s/concerns answered.

## 2020-11-20 NOTE — PROGRESS NOTES
Hospital Medicine Daily Progress Note    Date of Service  11/20/2020    Chief Complaint  69 y.o. female admitted 11/18/2020 with sob, hypoxia from covid 19 pneumonia    Hospital Course  This 69 y.o. female with PMH of ESRD on HD, HTN, DM, hypothyroid who presented 11/18/2020 with cough and SOB for 1 month.  And is been getting worse over the past few days.  CXR showed consolidation.  Covid: was positive.  She required 2 LPM NC with ambulation and 1 LPM O2 while at rest.  She is Hebrew-speaking only    Interval Problem Update  Patient requiring minimal oxygen to keep O2 sats >90%.  She is worse with exertion with drop in O2 sats requiring 2 LPM NC of oxygen.  HD for COVID + patient arranged.  Home oxygen DME ordered.  Patient ambulating well, eating well with minimal distress and is able to be discharged to home.    Consultants/Specialty  Dr. Villar    Code Status  Full Code    Disposition  Arranged for COVID + HD unit on TTS.  Home O2 ordered.    Review of Systems  Review of Systems   Constitutional: Negative for chills, diaphoresis, fever and malaise/fatigue.   HENT: Negative for congestion and sore throat.    Eyes: Negative for pain and discharge.   Respiratory: Positive for shortness of breath. Negative for cough, hemoptysis, sputum production and wheezing.    Cardiovascular: Negative for chest pain, palpitations, claudication and leg swelling.   Gastrointestinal: Negative for abdominal pain, constipation, diarrhea, melena, nausea and vomiting.   Genitourinary: Negative for dysuria, frequency and urgency.   Musculoskeletal: Negative for back pain, joint pain, myalgias and neck pain.   Skin: Negative for itching and rash.   Neurological: Negative for dizziness, sensory change, speech change, focal weakness, loss of consciousness, weakness and headaches.   Endo/Heme/Allergies: Does not bruise/bleed easily.   Psychiatric/Behavioral: Negative for depression, substance abuse and suicidal ideas.        Physical  Exam  Temp:  [36.1 °C (97 °F)-37.2 °C (98.9 °F)] 36.1 °C (97 °F)  Pulse:  [56-61] 59  Resp:  [16-20] 16  BP: (108-142)/(43-48) 112/48  SpO2:  [95 %-99 %] 95 %    Physical Exam  Vitals signs and nursing note reviewed.   Constitutional:       General: She is not in acute distress.     Appearance: She is well-developed. She is not diaphoretic.   HENT:      Head: Normocephalic and atraumatic.      Nose: Nose normal.      Mouth/Throat:      Pharynx: No oropharyngeal exudate.   Eyes:      General: No scleral icterus.        Right eye: No discharge.         Left eye: No discharge.      Conjunctiva/sclera: Conjunctivae normal.      Pupils: Pupils are equal, round, and reactive to light.   Neck:      Musculoskeletal: Normal range of motion and neck supple.      Thyroid: No thyromegaly.      Vascular: No JVD.      Trachea: No tracheal deviation.   Cardiovascular:      Rate and Rhythm: Normal rate and regular rhythm.      Heart sounds: Normal heart sounds. No murmur. No friction rub. No gallop.    Pulmonary:      Effort: Pulmonary effort is normal. No respiratory distress.      Breath sounds: Normal breath sounds. No stridor. No wheezing or rales.      Comments: On 1 LPM NC, sitting up at bedside in NAD  Chest:      Chest wall: No tenderness.   Abdominal:      General: Bowel sounds are normal. There is no distension.      Palpations: Abdomen is soft. There is no mass.      Tenderness: There is no abdominal tenderness. There is no guarding or rebound.   Musculoskeletal: Normal range of motion.         General: No tenderness.   Lymphadenopathy:      Cervical: No cervical adenopathy.   Skin:     General: Skin is warm and dry.      Findings: No erythema or rash.   Neurological:      Mental Status: She is alert and oriented to person, place, and time.      Cranial Nerves: No cranial nerve deficit.      Motor: No abnormal muscle tone.      Coordination: Coordination normal.   Psychiatric:         Behavior: Behavior normal.          "Thought Content: Thought content normal.         Judgment: Judgment normal.         Fluids    Intake/Output Summary (Last 24 hours) at 11/20/2020 0824  Last data filed at 11/19/2020 2015  Gross per 24 hour   Intake 480 ml   Output --   Net 480 ml       Laboratory  Recent Labs     11/18/20  1345 11/19/20  0453   WBC 2.0* 1.4*   RBC 3.06* 2.72*   HEMOGLOBIN 9.2* 8.1*   HEMATOCRIT 28.6* 26.4*   MCV 93.5 97.1   MCH 30.1 29.8   MCHC 32.2* 30.7*   RDW 48.5 50.7*   PLATELETCT 102* 92*   MPV 11.2 10.5     Recent Labs     11/18/20  1345 11/19/20  0453   SODIUM 135 133*   POTASSIUM 4.4 4.6   CHLORIDE 91* 92*   CO2 29 24   GLUCOSE 101* 79   BUN 15 26*   CREATININE 4.25* 5.83*   CALCIUM 9.4 8.7                   Imaging  DX-CHEST-PORTABLE (1 VIEW)   Final Result      New right lung consolidation is compatible with Covid 19 pneumonia           Assessment/Plan  Acute hypoxemic respiratory failure due to COVID-19 (HCC)- (present on admission)  Assessment & Plan  Droplet precautions  Procalcitonin negative, dc'd ceftriaxone and doxycycline  D-dimer negative  CXR c/w COVID 19 infiltrate, personally reviewed, no obvious \"consolidation\" seen in RLL as reported by radiologist.  Oxygen for home arranged, requiring 2 LPM NC  Vitamin C   Vitamin D   steroid inhaler ordered  Diabetic and minimal oxygen needs, no decadron ordered.    Pancytopenia ([facility])- (present on admission)  Assessment & Plan  Chronic  Viral suppression     Pneumonia due to COVID-19 virus- (present on admission)  Assessment & Plan  No role for antibiotics given negative procalcitonin and viral source pneumonia.      ESRD (end stage renal disease) on dialysis ([facility])- (present on admission)  Assessment & Plan  Had HD today  MWF schedule  Arranged for covid 19+ HD unit as outpatient.    Controlled type 2 diabetes mellitus with chronic kidney disease on chronic dialysis, without long-term current use of insulin ([facility])- (present on admission)  Assessment & Plan  SSI    Essential " hypertension- (present on admission)  Assessment & Plan  Continue outpatient meds       VTE prophylaxis: heparin

## 2020-11-20 NOTE — DISCHARGE INSTRUCTIONS
Due to Covid 19 positive result pt will be placed at the [facility] at:     [facility]  [address] [place] [address]     Schedule: Tuesday, Thursday, Saturday  Time: 11:30am    Discharge Instructions    Discharged to home by car with relative. Discharged via wheelchair, hospital escort: Yes.  Special equipment needed: Not Applicable and Oxygen    Be sure to schedule a follow-up appointment with your primary care doctor or any specialists as instructed.     Discharge Plan:   Influenza Vaccine Indication: Not indicated: Previously immunized this influenza season and > 8 years of age    I understand that a diet low in cholesterol, fat, and sodium is recommended for good health. Unless I have been given specific instructions below for another diet, I accept this instruction as my diet prescription.   Other diet:   Diabetes Mellitus and Nutrition, Adult  When you have diabetes (diabetes mellitus), it is very important to have healthy eating habits because your blood sugar (glucose) levels are greatly affected by what you eat and drink. Eating healthy foods in the appropriate amounts, at about the same times every day, can help you:  · Control your blood glucose.  · Lower your risk of heart disease.  · Improve your blood pressure.  · Reach or maintain a healthy weight.  Every person with diabetes is different, and each person has different needs for a meal plan. Your health care provider may recommend that you work with a diet and nutrition specialist (dietitian) to make a meal plan that is best for you. Your meal plan may vary depending on factors such as:  · The calories you need.  · The medicines you take.  · Your weight.  · Your blood glucose, blood pressure, and cholesterol levels.  · Your activity level.  · Other health conditions you have, such as heart or kidney disease.  How do carbohydrates affect me?  Carbohydrates, also called carbs, affect your blood glucose level more than any other type of  "food. Eating carbs naturally raises the amount of glucose in your blood. Carb counting is a method for keeping track of how many carbs you eat. Counting carbs is important to keep your blood glucose at a healthy level, especially if you use insulin or take certain oral diabetes medicines.  It is important to know how many carbs you can safely have in each meal. This is different for every person. Your dietitian can help you calculate how many carbs you should have at each meal and for each snack.  Foods that contain carbs include:  · Bread, cereal, rice, pasta, and crackers.  · Potatoes and corn.  · Peas, beans, and lentils.  · Milk and yogurt.  · Fruit and juice.  · Desserts, such as cakes, cookies, ice cream, and candy.  How does alcohol affect me?  Alcohol can cause a sudden decrease in blood glucose (hypoglycemia), especially if you use insulin or take certain oral diabetes medicines. Hypoglycemia can be a life-threatening condition. Symptoms of hypoglycemia (sleepiness, dizziness, and confusion) are similar to symptoms of having too much alcohol.  If your health care provider says that alcohol is safe for you, follow these guidelines:  · Limit alcohol intake to no more than 1 drink per day for nonpregnant women and 2 drinks per day for men. One drink equals 12 oz of beer, 5 oz of wine, or 1½ oz of hard liquor.  · Do not drink on an empty stomach.  · Keep yourself hydrated with water, diet soda, or unsweetened iced tea.  · Keep in mind that regular soda, juice, and other mixers may contain a lot of sugar and must be counted as carbs.  What are tips for following this plan?    Reading food labels  · Start by checking the serving size on the \"Nutrition Facts\" label of packaged foods and drinks. The amount of calories, carbs, fats, and other nutrients listed on the label is based on one serving of the item. Many items contain more than one serving per package.  · Check the total grams (g) of carbs in one serving. " "You can calculate the number of servings of carbs in one serving by dividing the total carbs by 15. For example, if a food has 30 g of total carbs, it would be equal to 2 servings of carbs.  · Check the number of grams (g) of saturated and trans fats in one serving. Choose foods that have low or no amount of these fats.  · Check the number of milligrams (mg) of salt (sodium) in one serving. Most people should limit total sodium intake to less than 2,300 mg per day.  · Always check the nutrition information of foods labeled as \"low-fat\" or \"nonfat\". These foods may be higher in added sugar or refined carbs and should be avoided.  · Talk to your dietitian to identify your daily goals for nutrients listed on the label.  Shopping  · Avoid buying canned, premade, or processed foods. These foods tend to be high in fat, sodium, and added sugar.  · Shop around the outside edge of the grocery store. This includes fresh fruits and vegetables, bulk grains, fresh meats, and fresh dairy.  Cooking  · Use low-heat cooking methods, such as baking, instead of high-heat cooking methods like deep frying.  · Cook using healthy oils, such as olive, canola, or sunflower oil.  · Avoid cooking with butter, cream, or high-fat meats.  Meal planning  · Eat meals and snacks regularly, preferably at the same times every day. Avoid going long periods of time without eating.  · Eat foods high in fiber, such as fresh fruits, vegetables, beans, and whole grains. Talk to your dietitian about how many servings of carbs you can eat at each meal.  · Eat 4-6 ounces (oz) of lean protein each day, such as lean meat, chicken, fish, eggs, or tofu. One oz of lean protein is equal to:  ? 1 oz of meat, chicken, or fish.  ? 1 egg.  ? ¼ cup of tofu.  · Eat some foods each day that contain healthy fats, such as avocado, nuts, seeds, and fish.  Lifestyle  · Check your blood glucose regularly.  · Exercise regularly as told by your health care provider. This may " include:  ? 150 minutes of moderate-intensity or vigorous-intensity exercise each week. This could be brisk walking, biking, or water aerobics.  ? Stretching and doing strength exercises, such as yoga or weightlifting, at least 2 times a week.  · Take medicines as told by your health care provider.  · Do not use any products that contain nicotine or tobacco, such as cigarettes and e-cigarettes. If you need help quitting, ask your health care provider.  · Work with a counselor or diabetes educator to identify strategies to manage stress and any emotional and social challenges.  Questions to ask a health care provider  · Do I need to meet with a diabetes educator?  · Do I need to meet with a dietitian?  · What number can I call if I have questions?  · When are the best times to check my blood glucose?  Where to find more information:  · American Diabetes Association: diabetes.org  · Academy of Nutrition and Dietetics: www.eatright.org  · National [place] of Diabetes and Digestive and Kidney Diseases (NIH): www.niddk.nih.gov  Summary  · A healthy meal plan will help you control your blood glucose and maintain a healthy lifestyle.  · Working with a diet and nutrition specialist (dietitian) can help you make a meal plan that is best for you.  · Keep in mind that carbohydrates (carbs) and alcohol have immediate effects on your blood glucose levels. It is important to count carbs and to use alcohol carefully.  This information is not intended to replace advice given to you by your health care provider. Make sure you discuss any questions you have with your health care provider.  Document Released: 09/14/2006 Document Revised: 11/30/2018 Document Reviewed: 01/22/2018  [employer] Patient Education © 2020 [employer]      Special Instructions: None    · Is patient discharged on Warfarin / Coumadin?   No     Depression / Suicide Risk    As you are discharged from this [facility] facility, it is important to learn how to  keep safe from harming yourself.    Recognize the warning signs:  · Abrupt changes in personality, positive or negative- including increase in energy   · Giving away possessions  · Change in eating patterns- significant weight changes-  positive or negative  · Change in sleeping patterns- unable to sleep or sleeping all the time   · Unwillingness or inability to communicate  · Depression  · Unusual sadness, discouragement and loneliness  · Talk of wanting to die  · Neglect of personal appearance   · Rebelliousness- reckless behavior  · Withdrawal from people/activities they love  · Confusion- inability to concentrate     If you or a loved one observes any of these behaviors or has concerns about self-harm, here's what you can do:  · Talk about it- your feelings and reasons for harming yourself  · Remove any means that you might use to hurt yourself (examples: pills, rope, extension cords, firearm)  · Get professional help from the community (Mental Health, Substance Abuse, psychological counseling)  · Do not be alone:Call your Safe Contact- someone whom you trust who will be there for you.  · Call your local CRISIS HOTLINE 385-5215 or 171-475-7779  · Call your local Children's Mobile Crisis Response Team [place] (885) 379-0119 or www.[employer]  · Call the toll free National Suicide Prevention Hotlines   · National Suicide Prevention Lifeline 405-876-PDDT (4554)  · National Hope Line Network 800-SUICIDE (125-5924)    INSTRUCTIONS FOR COVID-19 OR ANY OTHER INFECTIOUS RESPIRATORY ILLNESSES    The Centers for Disease Control and Prevention (CDC) states that early indications for COVID-19 include cough, shortness of breath, difficulty breathing, or at least two of the following symptoms: chills, shaking with chills, muscle pain, headache, sore throat, and loss of taste or smell. Symptoms can range from mild to severe and may appear up to two weeks after exposure to the virus.    The practice of  self-isolation and quarantine helps protect the public and your family by  preventing exposure to people who have or may have a contagious disease. Please follow the prevention steps below as based on CDC guidelines:    WHEN TO STOP ISOLATION: Persons with COVID-19 or any other infectious respiratory illness who have symptoms and were advised to care for themselves at home may discontinue home isolation under the following conditions:  · At least 24 hours have passed since recovery defined as resolution of fever without the use of fever-reducing medications; AND,  · Improvement in respiratory symptoms (e.g., cough, shortness of breath); AND,  · At least 10 days have passed since symptoms first appeared and have had no subsequent illness.    MONITOR YOUR SYMPTOMS: If your illness is worsening, seek prompt medical attention. If you have a medical emergency and need to call 911, notify the dispatch personnel that you have, or are being evaluated for confirmed or suspected COVID-19 or another infectious respiratory illness. Wear a facemask if possible.    ACTIVITY RESTRICTION: restrict activities outside your home, except for getting medical care. Do not go to work, school, or public areas. Avoid using public transportation, ride-sharing, or taxis.    SCHEDULED MEDICAL APPOINTMENTS: Notify your provider that you have, or are being evaluated for, confirmed or suspected COVID-19 or another infectious respiratory. This will help the healthcare provider’s office safely take care of you and keep other people from getting exposed or infected.    FACEMASKS, when to wear: Anytime you are away from your home or around other people or pets. If you are unable to wear one, maintain a minimum of 6 feet distancing from others.    LIVING ENVIRONMENT: Stay in a separate room from other people and pets. If possible, use a separate bathroom, have someone else care for your pets and avoid sharing household items. Any items used should be  washed thoroughly with soap and water. Clean all “high-touch” surfaces every day. Use a household cleaning spray or wipe, according to the label instructions. High touch surfaces include (but are not limited to) counters, tabletops, doorknobs, bathroom fixtures, toilets, phones, keyboards, tablets, and bedside tables.     HAND WASHING: Frequently wash hands with soap and water for at least 20 seconds,  especially after blowing your nose, coughing, or sneezing; going to the bathroom; before and after interacting with pets; and before and after eating or preparing food. If hands are visibly dirty use soap and water. If soap and water are not available, use an alcohol-based hand  with at least 60% alcohol. Avoid touching your eyes, nose, and mouth with unwashed hands. Cover your coughs and sneezes with a tissue. Throw used tissues in a lined trash can. Immediately wash your hands.    ACTIVE/FACILITATED SELF-MONITORING: Follow instructions provided by your local health department or health professionals, as appropriate. When working with your local health department check their available hours.    [facility]   Phone Number   [place] [phone]   [facility] [phone]   [place] [facility]   [place] [phone]     IF YOU HAVE CONFIRMED POSITIVE COVID-19:    Those who have completely recovered from COVID-19 may have immune-boosting antibodies in their plasma--called “convalescent plasma”--that could be used to treat critically ill COVID19 patients.    [facility] is excited to begin working with [facility]) on collecting convalescent plasma from  people who have recovered from COVID-19 as part of a program to treat patients infected with the virus. This FDA-approved “emergency investigational new drug” is a special blood product containing antibodies that may give patients an extra boost to fight the virus.    To be eligible to donate convalescent plasma, you must have a prior COVID-19  diagnosis documented by a laboratory test (or a positive test result for SARS-CoV-2 antibodies) and meet additional eligibility requirements.    If you are interested in donating convalescent plasma or have any additional questions, please contact the [facility] Convalescent Plasma  at [phone] or via e-mail at [email or web address].

## 2020-11-20 NOTE — DISCHARGE PLANNING
Received Choice form at 8889  Agency/Facility Name: [facility]  Referral sent per Choice form @ [phone]

## 2020-11-23 LAB
BACTERIA BLD CULT: NORMAL
SIGNIFICANT IND 70042: NORMAL
SITE SITE: NORMAL
SOURCE SOURCE: NORMAL

## 2020-11-24 LAB
BACTERIA BLD CULT: NORMAL
SIGNIFICANT IND 70042: NORMAL
SITE SITE: NORMAL
SOURCE SOURCE: NORMAL

## 2021-01-14 NOTE — DISCHARGE PLANNING
Anticipated Discharge Disposition: Home already.    Action: Pt returned to ER with E tank wanting to return it to ER. O2 Dispensed by [facility].  Unable to see that they were dispensed from ER or Home Monitoring program in November. Pt was covid. Reviewed with RT Swetha. Advised to return to [facility] and therefore will not show as a unreturned tank. Pt speaks Thai therefore Shavonne salazar provided [facility] number  and will provide this info to pt.     Barriers to Discharge: None    Plan: Pt to contact [facility] to return any and all equipment if done with services

## 2021-01-15 DIAGNOSIS — Z23 NEED FOR VACCINATION: ICD-10-CM

## 2021-01-20 ENCOUNTER — TELEPHONE (OUTPATIENT)
Dept: MEDICAL GROUP | Facility: MEDICAL CENTER | Age: 71
End: 2021-01-20

## 2021-01-20 NOTE — TELEPHONE ENCOUNTER
ESTABLISHED PATIENT PRE-VISIT PLANNING     Patient was NOT contacted to complete PVP.     Note: Patient will not be contacted if there is no indication to call.     1.  Reviewed notes from the last few office visits within the medical group: Yes    2.  If any orders were placed at last visit or intended to be done for this visit (i.e. 6 mos follow-up), do we have Results/Consult Notes?         •  Labs - Labs were not ordered at last office visit.  Note: If patient appointment is for lab review and patient did not complete labs, check with provider if OK to reschedule patient until labs completed.       •  Imaging - Imaging was not ordered at last office visit.       •  Referrals - Referral ordered, patient has NOT been seen.    3. Is this appointment scheduled as a Hospital Follow-Up? No    4.  Immunizations were updated in Epic using Reconcile Outside Information activity? Yes    5.  Patient is due for the following Health Maintenance Topics:   Health Maintenance Due   Topic Date Due   • Annual Wellness Visit  1950   • IMM HEP B VACCINE (1 of 3 - Risk Recombivax 3-dose series) 12/11/1968   • IMM DTaP/Tdap/Td Vaccine (1 - Tdap) 12/11/1968   • IMM ZOSTER VACCINES (1 of 2) 12/11/1999   • BONE DENSITY  12/11/2014   • MAMMOGRAM  08/24/2020   • IMM INFLUENZA (1) 09/01/2020       6.  AHA (Pulse8) form printed for Provider? N/A

## 2021-01-22 ENCOUNTER — OFFICE VISIT (OUTPATIENT)
Dept: ENDOCRINOLOGY | Facility: MEDICAL CENTER | Age: 71
End: 2021-01-22
Attending: INTERNAL MEDICINE
Payer: MEDICARE

## 2021-01-22 VITALS
DIASTOLIC BLOOD PRESSURE: 60 MMHG | OXYGEN SATURATION: 98 % | HEIGHT: 60 IN | HEART RATE: 69 BPM | SYSTOLIC BLOOD PRESSURE: 110 MMHG | BODY MASS INDEX: 25.91 KG/M2 | WEIGHT: 132 LBS

## 2021-01-22 DIAGNOSIS — I10 ESSENTIAL HYPERTENSION: ICD-10-CM

## 2021-01-22 DIAGNOSIS — E78.2 MIXED HYPERLIPIDEMIA: ICD-10-CM

## 2021-01-22 DIAGNOSIS — N18.6 CONTROLLED TYPE 2 DIABETES MELLITUS WITH CHRONIC KIDNEY DISEASE ON CHRONIC DIALYSIS, WITHOUT LONG-TERM CURRENT USE OF INSULIN (HCC): ICD-10-CM

## 2021-01-22 DIAGNOSIS — Z79.84 LONG TERM (CURRENT) USE OF ORAL HYPOGLYCEMIC DRUGS: ICD-10-CM

## 2021-01-22 DIAGNOSIS — M79.10 MYALGIA: ICD-10-CM

## 2021-01-22 DIAGNOSIS — Z99.2 CONTROLLED TYPE 2 DIABETES MELLITUS WITH CHRONIC KIDNEY DISEASE ON CHRONIC DIALYSIS, WITHOUT LONG-TERM CURRENT USE OF INSULIN (HCC): ICD-10-CM

## 2021-01-22 DIAGNOSIS — E11.22 CONTROLLED TYPE 2 DIABETES MELLITUS WITH CHRONIC KIDNEY DISEASE ON CHRONIC DIALYSIS, WITHOUT LONG-TERM CURRENT USE OF INSULIN (HCC): ICD-10-CM

## 2021-01-22 DIAGNOSIS — E03.8 SUBCLINICAL HYPOTHYROIDISM: ICD-10-CM

## 2021-01-22 LAB
HBA1C MFR BLD: 5.6 % (ref 0–5.6)
INT CON NEG: NORMAL
INT CON POS: NORMAL

## 2021-01-22 PROCEDURE — 99214 OFFICE O/P EST MOD 30 MIN: CPT | Performed by: INTERNAL MEDICINE

## 2021-01-22 PROCEDURE — 83036 HEMOGLOBIN GLYCOSYLATED A1C: CPT | Performed by: INTERNAL MEDICINE

## 2021-01-22 PROCEDURE — 99213 OFFICE O/P EST LOW 20 MIN: CPT | Performed by: INTERNAL MEDICINE

## 2021-01-22 RX ORDER — LEVOTHYROXINE SODIUM 0.05 MG/1
50 TABLET ORAL
COMMUNITY
End: 2021-01-22 | Stop reason: SDUPTHER

## 2021-01-22 RX ORDER — PIOGLITAZONEHYDROCHLORIDE 30 MG/1
30 TABLET ORAL DAILY
Qty: 90 TAB | Refills: 3 | Status: SHIPPED | OUTPATIENT
Start: 2021-01-22 | End: 2022-03-31

## 2021-01-22 RX ORDER — LEVOTHYROXINE SODIUM 0.05 MG/1
50 TABLET ORAL
Qty: 90 TAB | Refills: 3 | Status: SHIPPED | OUTPATIENT
Start: 2021-01-22 | End: 2022-03-31

## 2021-01-22 RX ORDER — PIOGLITAZONEHYDROCHLORIDE 30 MG/1
30 TABLET ORAL DAILY
COMMUNITY
End: 2021-01-22 | Stop reason: SDUPTHER

## 2021-01-22 RX ORDER — ATORVASTATIN CALCIUM 20 MG/1
20 TABLET, FILM COATED ORAL NIGHTLY
COMMUNITY
End: 2021-01-22

## 2021-01-22 ASSESSMENT — FIBROSIS 4 INDEX: FIB4 SCORE: 5.98

## 2021-01-23 NOTE — PROGRESS NOTES
CHIEF COMPLAINT: Patient is here for follow up of Type 2 Diabetes Mellitus    HPI:     Tere Gallegos is a 68 y.o. female with Type 2 Diabetes Mellitus here for follow up.     Labs from January 22, 2021 show A1c is well controlled at 5.6%  Labs from February 19, 2020 show A1c was 5.6%.    Previously her A1c was elevated at 8.7%.        I initially saw her as a consult from Kathy CHAPA for de joseph poorly controlled diabetes at baseline.  She has end-stage renal disease and is on hemodialysis and also has a history of subclinical hypothyroidism and hyperlipidemia.   After her initial consultation visit I started her on Trulicity but she then requested to be taken off it because she prefers oral agents.           On follow-up she is on Pioglitazone 30mg daily.  She reports good compliance.  Even though she has a history of chronic diastolic congestive heart failure with a preserved EF of 60% she has not experienced any acute exacerbation.    She forgot to complete her labs before this visit so we completed a point-of-care A1c today.   She does not check her sugars unfortunately but on her labs her plasma glucose levels have been well controlled        She also had uncontrolled subclinical hypothyroidism at baseline with a suboptimal TSH of 3.2 in April.   She is currently on levothyroxine 50 mcg daily which has been her medication for over 6 months  She reports good compliance but unfortunately do not have updated labs including a TSH level        She has hyperlipidemia that is fairly controlled and is taking Atorvastatin  She is complaining of muscle aches on her back and both legs but she cannot really give me a good history as to when the symptoms started.  Tylenol is not controlling her pain unfortunately  Her last LDL cholesterol was well controlled at 37 with triglycerides of 99 on April 27, 2020        She also has essential hypertension and is taking metoprolol, lisinopril and amlodipine and  blood pressure is well controlled and she is tolerating her medications well          BG Diary:   Patient did not bring her glucose meter    Weight has been stable    Diabetes Complications   Retinopathy: No known retinopathy.  Last [facility] exam: [facility]/2020 [facility]  Neuropathy: Denies paresthesias or numbness in hands or feet. Denies any foot wounds.  Exercise: Minimal.  Diet: Fair.  Patient's medications, allergies, and social histories were reviewed and updated as appropriate.    ROS:     CONS:     No fever, no chills   EYES:     No diplopia, no blurry vision   CV:           No chest pain, no palpitations   PULM:     No SOB, no cough, no hemoptysis.   GI:            No nausea, no vomiting, no diarrhea, no constipation   ENDO:     No polyuria, no polydipsia, no heat intolerance, no cold intolerance       Past Medical History:  Problem List:  2020-11: Acute hypoxemic respiratory failure due to COVID-19 ([facility])  2020-11: Pneumonia due to COVID-19 virus  2020-11: Pancytopenia ([facility])  2020-09: Numbness and tingling in both hands  2020-09: Anuria  2020-09: Anxiety  2020-07: Subclinical hypothyroidism  2020-07: Long term (current) use of oral hypoglycemic drugs  2020-05: Chronic diastolic heart failure ([facility])  2020-05: ESRD (end stage renal disease) on dialysis ([facility])  2020-05: Acquired hypothyroidism  2020-05: Dental disorder  2020-01: QT prolongation  2019-11: Mixed hyperlipidemia  2018-12: Elevated troponin  2016-09: Encounter for cervical Pap smear with pelvic exam  2016-09: Encounter to establish care with new doctor  2016-08: RLS (restless legs syndrome)  2016-08: Controlled type 2 diabetes mellitus with chronic kidney   disease on chronic dialysis, without long-term current use of insulin   ([facility])  2014-01: Hyperkalemia  2013-09: Essential hypertension  Kidney transplant candidate  Coronary artery disease due to lipid rich plaque  Presence of drug-eluting stent in right coronary artery      Past Surgical History:  Past  Surgical History:   Procedure Laterality Date   • ZZZ CARDIAC CATH  9/7/2016    RCA stented with 2 Synergy drug-eluting stents.   • RECOVERY  8/16/2016    Procedure: CATH LAB [facility] WITH POSSIBLE DR. CASTILLO;  Surgeon: [facility];  Location: SURGERY PRE-POST PROC UNIT [facility];  Service:    • Z CARDIAC CATH  8/16/16    100% RCA   • OTHER Left 2014    left arm upper extremity fistula   • OTHER ABDOMINAL SURGERY      left kidney removed due to cancer        Allergies:  Patient has no known allergies.     Social History:  Social History     Tobacco Use   • Smoking status: Never Smoker   • Smokeless tobacco: Never Used   Substance Use Topics   • Alcohol use: No     Alcohol/week: 0.0 oz   • Drug use: No        Family History:   family history includes Diabetes in her brother and sister; Other in her sister.      PHYSICAL EXAM:   OBJECTIVE:  Vital signs: /60   Pulse 69   Ht 1.524 m (5')   Wt 59.9 kg (132 lb)   LMP  (LMP Unknown)   SpO2 98%   BMI 25.78 kg/m²   GENERAL: Well-developed, well-nourished in no apparent distress.   EYE:  No ocular asymmetry, PERRLA  HENT: Pink, moist mucous membranes.    NECK: No thyromegaly.   CARDIOVASCULAR:  No murmurs  LUNGS: Clear breath sounds  ABDOMEN: Soft, nontender   EXTREMITIES: No clubbing, cyanosis, or edema.   NEUROLOGICAL: No gross focal motor abnormalities   LYMPH: No cervical adenopathy palpated.   SKIN: No rashes, lesions.   Monofilament testing with a 10 gram force: sensation: intact bilaterally  Visual Inspection: Feet without maceration, ulcers, or fissures.  Pedal pulses: intact bilaterally    Labs:  Lab Results   Component Value Date/Time    HBA1C 5.6 01/22/2021 04:41 PM        Lab Results   Component Value Date/Time    WBC 1.4 (LL) 11/19/2020 04:53 AM    RBC 2.72 (L) 11/19/2020 04:53 AM    HEMOGLOBIN 8.1 (L) 11/19/2020 04:53 AM    MCV 97.1 11/19/2020 04:53 AM    MCH 29.8 11/19/2020 04:53 AM    MCHC 30.7 (L) 11/19/2020 04:53 AM    RDW 50.7 (H)  11/19/2020 04:53 AM    MPV 10.5 11/19/2020 04:53 AM       Lab Results   Component Value Date/Time    SODIUM 133 (L) 11/19/2020 04:53 AM    POTASSIUM 4.6 11/19/2020 04:53 AM    CHLORIDE 92 (L) 11/19/2020 04:53 AM    CO2 24 11/19/2020 04:53 AM    ANION 17.0 (H) 11/19/2020 04:53 AM    GLUCOSE 79 11/19/2020 04:53 AM    BUN 26 (H) 11/19/2020 04:53 AM    CREATININE 5.83 (HH) 11/19/2020 04:53 AM    CALCIUM 8.7 11/19/2020 04:53 AM    ASTSGOT 31 11/19/2020 04:53 AM    ALTSGPT 16 11/19/2020 04:53 AM    TBILIRUBIN 0.2 11/19/2020 04:53 AM    ALBUMIN 3.4 11/19/2020 04:53 AM    TOTPROTEIN 6.3 11/19/2020 04:53 AM    GLOBULIN 2.9 11/19/2020 04:53 AM    AGRATIO 1.2 11/19/2020 04:53 AM       Lab Results   Component Value Date/Time    CHOLSTRLTOT 91 (L) 12/23/2018 0101    TRIGLYCERIDE 139 12/23/2018 0101    HDL 30 (A) 12/23/2018 0101    LDL 33 12/23/2018 0101       Lab Results   Component Value Date/Time    MALBCRT 6207 (H) 09/29/2020 10:55 AM    MICROALBUR 104.4 09/29/2020 10:55 AM        Lab Results   Component Value Date/Time    TSHULTRASEN 3.220 11/12/2019 1247     No results found for: FREEDIR  No results found for: FREET3  No results found for: THYSTIMIG        ASSESSMENT/PLAN:     1. Controlled type 2 diabetes mellitus with chronic kidney disease on chronic dialysis, without long-term current use of insulin (HCC)  Well-controlled  Continue pioglitazone 30 mg daily  Even though she is anemic her sugars are well controlled and correlate with her A1c fairly well  I recommend that she check her sugars at least once a day  Recommend that she watch her carb intake  Recommend that she exercise regularly  We will plan for follow-up in 3 months      2. Subclinical hypothyroidism  Unstable  I am checking a TSH today and I will update her  I will make recommendations with regards to adjusting her levothyroxine dose if it is indicated but for now I want her to continue levothyroxine 50 mcg daily until we review her blood work    3. Mixed  hyperlipidemia  Her LDL cholesterol is at goal   However she is complaining of back pain and thigh muscle aches of unknown etiology  I am going to discontinue atorvastatin for now  I recommend she follow low-fat diet  Recommend repeating fasting lipids in 12 months      4. Essential hypertension  Well-controlled continue current medications      5. Long term (current) use of oral hypoglycemic drugs  Patient is on oral agents for type 2 diabetes management    5. Myalgia  she is complaining of back pain and muscle aches of unknown etiology  I am discontinuing her statin for now BUT if her pain persists,  I recommend that she follow-up with her primary care physician      Return in about 3 months (around 4/22/2021).      Thank you kindly for allowing me to participate in the diabetes care plan for this patient.    Jimmy Bloom MD, MAYELA, [facility]  12/05/19    CC:   ANGLEITA Concepcion

## 2021-01-23 NOTE — PROGRESS NOTES
"RN-CDE Note    Subjective:   Endocrinology Clinic Progress Note  PCP: ANGELITA Concepcion    HPI:  Tere Gallegos is a 70 y.o. old patient who is seen today for review of Type 2 Diabetes and Hypothyroidism.  She is on dialysis and is on the transplant list. She complains of leg and back pain afer dialysis.    DM:   Last A1c:   Lab Results   Component Value Date/Time    HBA1C 5.6 01/22/2021 04:41 PM      A1C GOAL: < 7    Diabetes Medications:   Actos 30 mg daily.  Taking above medications as prescribed: yes  Taking daily ASA: Yes    Exercise: no regular exercise, sedentary  Diet: \"healthy\" diet  in general  Patient's body mass index is 25.78 kg/m². Exercise and nutrition counseling were performed at this visit.    Glucose monitoring frequency: not testing blood sugars    Hypoglycemic episodes: no  Last Retinal Exam: on file and up-to-date  Daily Foot Exam: Yes   Foot Exam:  Monofilament: done  Monofilament testing with a 10 gram force: sensation intact: intact bilaterally  Visual Inspection: Feet without maceration, ulcers, fissures.  Pedal pulses: intact bilaterally   Lab Results   Component Value Date/Time    MALBCRT 6207 (H) 09/29/2020 10:55 AM    MICROALBUR 104.4 09/29/2020 10:55 AM      ACR Albumin/Creatinine Ratio goal <30   Currently Rx ACE/ARB: Yes   Dyslipidemia:  Lab Results   Component Value Date/Time    CHOLSTRLTOT 125 09/29/2020 10:56 AM    LDL 54 09/29/2020 10:56 AM    HDL 48 09/29/2020 10:56 AM    TRIGLYCERIDE 113 09/29/2020 10:56 AM       Currently Rx Statin: Yes     She  reports that she has never smoked. She has never used smokeless tobacco.      Plan:     Discussed and educated on:   - All medications, side effects and compliance (discussed carefully)  - Annual eye examinations at Ophthalmology  - Home glucose monitoring emphasized  - Weight control and daily exercise    Recommended medication changes: Will need thyroid lab work done.   "

## 2021-01-25 ENCOUNTER — OFFICE VISIT (OUTPATIENT)
Dept: MEDICAL GROUP | Facility: MEDICAL CENTER | Age: 71
End: 2021-01-25
Payer: MEDICARE

## 2021-01-25 ENCOUNTER — TELEPHONE (OUTPATIENT)
Dept: MEDICAL GROUP | Facility: MEDICAL CENTER | Age: 71
End: 2021-01-25

## 2021-01-25 VITALS
WEIGHT: 132 LBS | DIASTOLIC BLOOD PRESSURE: 54 MMHG | HEIGHT: 60 IN | BODY MASS INDEX: 25.91 KG/M2 | TEMPERATURE: 98.4 F | HEART RATE: 64 BPM | SYSTOLIC BLOOD PRESSURE: 130 MMHG | OXYGEN SATURATION: 98 %

## 2021-01-25 DIAGNOSIS — N18.6 ESRD (END STAGE RENAL DISEASE) ON DIALYSIS (HCC): ICD-10-CM

## 2021-01-25 DIAGNOSIS — Z76.82 KIDNEY TRANSPLANT CANDIDATE: Chronic | ICD-10-CM

## 2021-01-25 DIAGNOSIS — Z99.2 ESRD (END STAGE RENAL DISEASE) ON DIALYSIS (HCC): ICD-10-CM

## 2021-01-25 DIAGNOSIS — M79.604 RIGHT LEG PAIN: ICD-10-CM

## 2021-01-25 DIAGNOSIS — G25.81 RLS (RESTLESS LEGS SYNDROME): ICD-10-CM

## 2021-01-25 DIAGNOSIS — L29.9 ITCHING: ICD-10-CM

## 2021-01-25 PROCEDURE — 99214 OFFICE O/P EST MOD 30 MIN: CPT | Performed by: NURSE PRACTITIONER

## 2021-01-25 RX ORDER — TIZANIDINE 4 MG/1
4 TABLET ORAL 2 TIMES DAILY PRN
Qty: 30 TAB | Refills: 1 | Status: SHIPPED | OUTPATIENT
Start: 2021-01-25 | End: 2021-05-31

## 2021-01-25 RX ORDER — HYDROXYZINE HYDROCHLORIDE 25 MG/1
TABLET, FILM COATED ORAL
Qty: 90 TAB | Refills: 0 | Status: SHIPPED | OUTPATIENT
Start: 2021-01-25 | End: 2021-05-31

## 2021-01-25 RX ORDER — GABAPENTIN 100 MG/1
CAPSULE ORAL
Qty: 120 CAP | Refills: 1 | Status: SHIPPED | OUTPATIENT
Start: 2021-01-25 | End: 2021-08-11

## 2021-01-25 ASSESSMENT — FIBROSIS 4 INDEX: FIB4 SCORE: 5.98

## 2021-01-25 ASSESSMENT — PATIENT HEALTH QUESTIONNAIRE - PHQ9: CLINICAL INTERPRETATION OF PHQ2 SCORE: 0

## 2021-01-25 NOTE — TELEPHONE ENCOUNTER
1. [facility]                         Call Back Number: [phone]    Pt received call regarding kidney transplant. Called Jes for clarification. Their office stated that they put in the referral and the referral coordinator at [facility] would call. They stated that the wait time for the transplant is typically 5-7 years. Pts often receive a call to schedule a re-evaluation to meet with a surgeon and see if they still meet criteria to be put on the active transplant list.       1. Caller Name: [facility]                          Call Back Number: [phone]    Left message stating that clarification is needed on where pt is at in the process of the transplant.

## 2021-01-25 NOTE — ASSESSMENT & PLAN NOTE
Started yesterday when she woke up, in groin/hip area. Unable to stand up because pain is too strong.

## 2021-01-26 NOTE — PROGRESS NOTES
Subjective:   Tere Gallegos is a 70 y.o. female here today for the following concerns:    Right leg pain  Started yesterday when she woke up, in groin/hip area. Unable to stand up because pain is too strong.     RLS (restless legs syndrome)  Chronic.  Previously well controlled with ropinirole 1 mg before dialysis and 1 mg before bed.  The 1 mg dose is still doing well prior to dialysis, however at night she is having more restless leg symptoms which is keeping her up at night.         Current medicines (including changes today)  Current Outpatient Medications   Medication Sig Dispense Refill   • gabapentin (NEURONTIN) 100 MG Cap Take 100 mg by mouth three times weekly after dialysis and take 100 mg by mouth nightly before bed. 120 Cap 1   • tizanidine (ZANAFLEX) 4 MG Tab Take 1 Tab by mouth 2 times a day as needed (LEG PAIN). 30 Tab 1   • hydrOXYzine HCl (ATARAX) 25 MG Tab TOME FERNANDO TABLETA POR VIA ORAL AL ACOSTARSE CUANDO SEA NECESARIO FOR ITCHING 90 Tab 0   • pioglitazone (ACTOS) 30 MG Tab Take 1 Tab by mouth every day. 90 Tab 3   • levothyroxine (SYNTHROID) 50 MCG Tab Take 1 Tab by mouth Every morning on an empty stomach. 90 Tab 3   • lisinopril (PRINIVIL) 40 MG tablet Take 1 Tab by mouth every Monday, Wednesday, and Friday. 30 Tab 3   • fluticasone (FLOVENT HFA) 110 MCG/ACT Aerosol Inhale 2 Puffs every 12 hours. (Patient not taking: Reported on 1/22/2021) 1 Each 0   • Cholecalciferol (VITAMIN D3) 2000 UNIT Cap Take 1 Cap by mouth every day. 30 Cap 3   • ascorbic acid (VITAMIN C) 500 MG tablet Take 1 Tab by mouth every day. 30 Tab 3   • ROPINIRole (REQUIP) 1 MG Tab Take one tablet before dialysis, take 2 tablets before bed. (Patient taking differently: Take 1-2 mg by mouth See Admin Instructions. Take one tablet before dialysis (Mon, Wed, and Friday) , take 2 tablets before bed.) 240 Tab 3   • carvedilol (COREG) 25 MG Tab Take 1 Tab by mouth 2 times a day, with meals. 180 Tab 3   • aspirin (ASA) 81 MG  Chew Tab chewable tablet TOME FERNANDO TABLETA YUDY MCCORMICK NOT COVERED OTC (Patient taking differently: Chew 81 mg every day.) 90 Tab 3   • furosemide (LASIX) 40 MG Tab Take 1 Tab by mouth every day. Hold for SBP less than 100 90 Tab 3     No current facility-administered medications for this visit.      She  has a past medical history of Acquired hypothyroidism (5/4/2020), CAD (coronary artery disease), Chronic diastolic heart failure (HCC) (5/4/2020), Coronary artery disease due to lipid rich plaque, Dental disorder, Diabetes ([facility]), ESRD (end stage renal disease) on dialysis ([facility]) (5/4/2020), Hemodialysis patient ([facility]), Hyperlipidemia, Hypertension, Kidney transplant candidate, Presence of drug-eluting stent in right coronary artery, QT prolongation (1/22/2020), RLS (restless legs syndrome) (8/5/2016), and Transaminitis (12/22/2018).    ROS   No chest pain, no shortness of breath, no abdominal pain  Positive ROS as per HPI.  All other systems reviewed and are negative.     Objective:     /54 (BP Location: Right arm, Patient Position: Sitting, BP Cuff Size: Adult)   Pulse 64   Temp 36.9 °C (98.4 °F) (Temporal)   Ht 1.524 m (5')   Wt 59.9 kg (132 lb)   SpO2 98%  Body mass index is 25.78 kg/m².     Physical Exam:  Constitutional: Alert, no distress.  Skin: Warm, dry, good turgor, no rashes in visible areas.  Eye: Equal, round and reactive, conjunctiva clear, lids normal.  ENMT: Lips without lesions, good dentition, oropharynx clear.  Neck: Trachea midline, no masses, no thyromegaly. No cervical or supraclavicular lymphadenopathy  Respiratory: Unlabored respiratory effort, lungs clear to auscultation, no wheezes, no ronchi.  Cardiovascular: Normal S1, S2, no murmur, no edema.  Abdomen: Soft, RLQ tenderness to palpation  Psych: Alert and oriented x3, normal affect and mood.  MSK: Pain with palpation of right hip, positive straight leg raise on right side only, pain with palpation of right anterior  thigh      Assessment and Plan:   The following treatment plan was discussed    1. Right leg pain  Unstable  Seems to be a muscle strain  Patient unable to take NSAIDs due to dialysis status  Advised acetaminophen 1000 mg TID as needed  Trial tizanidine  Concerned about RLQ tenderness, however patient not exhibiting any other concerning abdominal symptoms or systemic symptoms.  Strict ER precautions if these develop given to patient and family member.  - tizanidine (ZANAFLEX) 4 MG Tab; Take 1 Tab by mouth 2 times a day as needed (LEG PAIN).  Dispense: 30 Tab; Refill: 1    2. RLS (restless legs syndrome)  Unstable  Stop ropinerole  Trial gabapentin 100 mg after dialysis 3 times weekly and before bed at night.  - REFERRAL TO NEPHROLOGY  - gabapentin (NEURONTIN) 100 MG Cap; Take 100 mg by mouth three times weekly after dialysis and take 100 mg by mouth nightly before bed.  Dispense: 120 Cap; Refill: 1    3. ESRD (end stage renal disease) on dialysis (HCC)  Patient has not followed up with nephrologist since 2018, advised importance of following with nephrology.  New referral placed today.  - REFERRAL TO NEPHROLOGY    4. Kidney transplant candidate  - REFERRAL TO NEPHROLOGY    5. Itching  Unstable  Refilled hydroxyzine for as needed use  - hydrOXYzine HCl (ATARAX) 25 MG Tab; TOME FERNANDO TABLETA POR VIA ORAL AL ACOSTARSE CUANDO SEA NECESARIO FOR ITCHING  Dispense: 90 Tab; Refill: 0      Followup: Return in about 3 months (around 4/25/2021).    I have placed the below orders and discussed them with an approved delegating provider. The MA is performing the below orders under the direction of Dr. Davila

## 2021-01-27 NOTE — TELEPHONE ENCOUNTER
VOICEMAIL  1. Caller Name: [facility]  Call Back Number: [phone]    2. Message: Representative stated that they are in the process of scheduling a reevaluation for pt. They are processing her information and their  will contact the pt to schedule her to come to [place].

## 2021-01-29 LAB
ALBUMIN SERPL-MCNC: 3.7 G/DL (ref 3.7–4.7)
ALBUMIN/GLOB SERPL: 1.6 {RATIO} (ref 1.2–2.2)
ALP SERPL-CCNC: 87 IU/L (ref 39–117)
ALT SERPL-CCNC: 11 IU/L (ref 0–32)
AST SERPL-CCNC: 13 IU/L (ref 0–40)
BILIRUB SERPL-MCNC: <0.2 MG/DL (ref 0–1.2)
BUN SERPL-MCNC: 45 MG/DL (ref 8–27)
BUN/CREAT SERPL: 8 (ref 12–28)
CALCIUM SERPL-MCNC: 9.1 MG/DL (ref 8.7–10.3)
CHLORIDE SERPL-SCNC: 96 MMOL/L (ref 96–106)
CO2 SERPL-SCNC: 22 MMOL/L (ref 20–29)
CREAT SERPL-MCNC: 5.9 MG/DL (ref 0.57–1)
GLOBULIN SER CALC-MCNC: 2.3 G/DL (ref 1.5–4.5)
GLUCOSE SERPL-MCNC: 125 MG/DL (ref 65–99)
HBA1C MFR BLD: 5.8 % (ref 4.8–5.6)
POTASSIUM SERPL-SCNC: 4 MMOL/L (ref 3.5–5.2)
PROT SERPL-MCNC: 6 G/DL (ref 6–8.5)
SODIUM SERPL-SCNC: 136 MMOL/L (ref 134–144)
T4 FREE SERPL-MCNC: 1.52 NG/DL (ref 0.82–1.77)
TSH SERPL DL<=0.005 MIU/L-ACNC: 2.9 UIU/ML (ref 0.45–4.5)

## 2021-02-01 ENCOUNTER — TELEPHONE (OUTPATIENT)
Dept: ENDOCRINOLOGY | Facility: MEDICAL CENTER | Age: 71
End: 2021-02-01

## 2021-02-01 NOTE — TELEPHONE ENCOUNTER
Phone Number Called: [phone]    Call outcome: Left detailed message for patient. Informed to call back with any additional questions.    Message: Contacted patient and asked her to please call me back to go over lab results. I provided patient with our fax number.

## 2021-02-01 NOTE — TELEPHONE ENCOUNTER
----- Message from Jimmy Bloom M.D. sent at 1/29/2021  8:29 AM PST -----  Patient is [place]vian-speaking, please inform patient her TSH is better and is normal at 2.9 continue levothyroxine 50 mcg daily I am going to refill her thyroid medication    Please remind patient to do labs 1 week before appointment  Next time     Actually the team needs to remind her caregiver about doing the labs  because her caregivers are the ones bringing her to the office visit

## 2021-02-23 ENCOUNTER — APPOINTMENT (OUTPATIENT)
Dept: NEPHROLOGY | Facility: MEDICAL CENTER | Age: 71
End: 2021-02-23
Payer: MEDICARE

## 2021-03-30 NOTE — CARE PLAN
Problem: Bowel/Gastric:  Goal: Normal bowel function is maintained or improved  Outcome: MET Date Met: 12/24/18        
Problem: Communication  Goal: The ability to communicate needs accurately and effectively will improve  Outcome: PROGRESSING AS EXPECTED  Pt. Is Citizen of [place] speaking only but communicates well with . Pt Is able to express comfort needs effectively. Pt. Denies pain or discomfort at this time and is able to vocalize wants and needs.     Problem: Infection  Goal: Will remain free from infection  Outcome: PROGRESSING AS EXPECTED  No signs or symptoms of infection at this time. Standard precautions implemented. Hand hygiene performed before and after each encounter. Infection prevention education given.       
Problem: Safety  Goal: Will remain free from falls  Outcome: MET Date Met: 12/24/18        
Problem: Safety  Goal: Will remain free from injury  Outcome: PROGRESSING AS EXPECTED  Anticipate patient's needs. Keep belongings within reach.    Problem: Fluid Volume:  Goal: Will maintain balanced intake and output  Outcome: PROGRESSING AS EXPECTED  Schedule for dialysis in am.      
normal...

## 2021-04-13 DIAGNOSIS — Z95.5 S/P CORONARY ARTERY STENT PLACEMENT: ICD-10-CM

## 2021-04-13 RX ORDER — ASPIRIN 81 MG
TABLET,CHEWABLE ORAL
Qty: 90 TABLET | Refills: 0 | Status: SHIPPED | OUTPATIENT
Start: 2021-04-13 | End: 2021-07-06

## 2021-04-22 ENCOUNTER — TELEPHONE (OUTPATIENT)
Dept: MEDICAL GROUP | Facility: MEDICAL CENTER | Age: 71
End: 2021-04-22

## 2021-04-22 NOTE — TELEPHONE ENCOUNTER
ESTABLISHED PATIENT PRE-VISIT PLANNING     Patient was NOT contacted to complete PVP.      1.  Reviewed notes from the last few office visits within the medical group: Yes    2.  If any orders were placed at last visit or intended to be done for this visit (i.e. 6 mos follow-up), do we have Results/Consult Notes?   · Labs - Labs were not ordered at last office visit.  · Imaging - Imaging was not ordered at last office visit.  · Referrals - Referral ordered, patient has NOT been seen. Referral to Nephrology has been AUTHORIZED.    3. Is this appointment scheduled as a Hospital Follow-Up? Yes, visit was at [facility]. Hospital records have been requested. Encounter in chart    4.  Immunizations were updated in Epic using Reconcile Outside Information activity? Yes    5.  Patient is due for the following Health Maintenance Topics:   Health Maintenance Due   Topic Date Due   • Annual Wellness Visit  Never done   • COVID-19 Vaccine (1) Never done   • IMM HEP B VACCINE (1 of 3 - Risk Recombivax 3-dose series) Never done   • IMM DTaP/Tdap/Td Vaccine (1 - Tdap) Never done   • IMM ZOSTER VACCINES (1 of 2) Never done   • BONE DENSITY  Never done   • MAMMOGRAM  08/24/2020       -----------------------------------------------------------------------------------------------  This pre-Visit Planning note has been created for the Provider and Medical Assistant to review prior to the patient's office appointment. Patient is *NOT REQUIRED* to follow-up on this note.   Outside information NOT reconciled using the Mompery feature. Per [place], the Mompery feature is down as of 02/09/2021 at 2:00pm. Will reconcile outside information at a later date.

## 2021-04-28 ENCOUNTER — HOSPITAL ENCOUNTER (OUTPATIENT)
Dept: LAB | Facility: MEDICAL CENTER | Age: 71
End: 2021-04-28
Attending: INTERNAL MEDICINE
Payer: MEDICARE

## 2021-04-28 DIAGNOSIS — E11.22 CONTROLLED TYPE 2 DIABETES MELLITUS WITH CHRONIC KIDNEY DISEASE ON CHRONIC DIALYSIS, WITHOUT LONG-TERM CURRENT USE OF INSULIN (HCC): ICD-10-CM

## 2021-04-28 DIAGNOSIS — Z79.84 LONG TERM (CURRENT) USE OF ORAL HYPOGLYCEMIC DRUGS: ICD-10-CM

## 2021-04-28 DIAGNOSIS — E03.8 SUBCLINICAL HYPOTHYROIDISM: ICD-10-CM

## 2021-04-28 DIAGNOSIS — Z99.2 CONTROLLED TYPE 2 DIABETES MELLITUS WITH CHRONIC KIDNEY DISEASE ON CHRONIC DIALYSIS, WITHOUT LONG-TERM CURRENT USE OF INSULIN (HCC): ICD-10-CM

## 2021-04-28 DIAGNOSIS — E78.2 MIXED HYPERLIPIDEMIA: ICD-10-CM

## 2021-04-28 DIAGNOSIS — N18.6 CONTROLLED TYPE 2 DIABETES MELLITUS WITH CHRONIC KIDNEY DISEASE ON CHRONIC DIALYSIS, WITHOUT LONG-TERM CURRENT USE OF INSULIN (HCC): ICD-10-CM

## 2021-04-28 LAB
ALBUMIN SERPL BCP-MCNC: 4.3 G/DL (ref 3.2–4.9)
ALBUMIN/GLOB SERPL: 1.4 G/DL
ALP SERPL-CCNC: 80 U/L (ref 30–99)
ALT SERPL-CCNC: 12 U/L (ref 2–50)
ANION GAP SERPL CALC-SCNC: 11 MMOL/L (ref 7–16)
AST SERPL-CCNC: 17 U/L (ref 12–45)
BILIRUB SERPL-MCNC: 0.4 MG/DL (ref 0.1–1.5)
BUN SERPL-MCNC: 11 MG/DL (ref 8–22)
CALCIUM SERPL-MCNC: 9.5 MG/DL (ref 8.4–10.2)
CHLORIDE SERPL-SCNC: 93 MMOL/L (ref 96–112)
CO2 SERPL-SCNC: 30 MMOL/L (ref 20–33)
CREAT SERPL-MCNC: 3.3 MG/DL (ref 0.5–1.4)
GLOBULIN SER CALC-MCNC: 3 G/DL (ref 1.9–3.5)
GLUCOSE SERPL-MCNC: 89 MG/DL (ref 65–99)
POTASSIUM SERPL-SCNC: 3.8 MMOL/L (ref 3.6–5.5)
PROT SERPL-MCNC: 7.3 G/DL (ref 6–8.2)
SODIUM SERPL-SCNC: 134 MMOL/L (ref 135–145)
T4 FREE SERPL-MCNC: 1.9 NG/DL (ref 0.93–1.7)
TSH SERPL DL<=0.005 MIU/L-ACNC: 2.14 UIU/ML (ref 0.38–5.33)

## 2021-04-28 PROCEDURE — 83036 HEMOGLOBIN GLYCOSYLATED A1C: CPT | Mod: GA

## 2021-04-28 PROCEDURE — 80053 COMPREHEN METABOLIC PANEL: CPT

## 2021-04-28 PROCEDURE — 84443 ASSAY THYROID STIM HORMONE: CPT

## 2021-04-28 PROCEDURE — 84439 ASSAY OF FREE THYROXINE: CPT

## 2021-04-28 PROCEDURE — 36415 COLL VENOUS BLD VENIPUNCTURE: CPT | Mod: GA

## 2021-04-29 LAB
EST. AVERAGE GLUCOSE BLD GHB EST-MCNC: 111 MG/DL
HBA1C MFR BLD: 5.5 % (ref 4–5.6)

## 2021-05-03 ENCOUNTER — OFFICE VISIT (OUTPATIENT)
Dept: ENDOCRINOLOGY | Facility: MEDICAL CENTER | Age: 71
End: 2021-05-03
Attending: NURSE PRACTITIONER
Payer: MEDICARE

## 2021-05-03 VITALS
OXYGEN SATURATION: 97 % | HEART RATE: 64 BPM | WEIGHT: 133 LBS | DIASTOLIC BLOOD PRESSURE: 60 MMHG | BODY MASS INDEX: 26.11 KG/M2 | HEIGHT: 60 IN | SYSTOLIC BLOOD PRESSURE: 108 MMHG

## 2021-05-03 DIAGNOSIS — N18.6 CONTROLLED TYPE 2 DIABETES MELLITUS WITH CHRONIC KIDNEY DISEASE ON CHRONIC DIALYSIS, WITHOUT LONG-TERM CURRENT USE OF INSULIN (HCC): ICD-10-CM

## 2021-05-03 DIAGNOSIS — I10 ESSENTIAL HYPERTENSION: ICD-10-CM

## 2021-05-03 DIAGNOSIS — Z99.2 CONTROLLED TYPE 2 DIABETES MELLITUS WITH CHRONIC KIDNEY DISEASE ON CHRONIC DIALYSIS, WITHOUT LONG-TERM CURRENT USE OF INSULIN (HCC): ICD-10-CM

## 2021-05-03 DIAGNOSIS — G63 NEUROPATHY ASSOCIATED WITH ENDOCRINE DISORDER (HCC): ICD-10-CM

## 2021-05-03 DIAGNOSIS — E78.2 MIXED HYPERLIPIDEMIA: ICD-10-CM

## 2021-05-03 DIAGNOSIS — Z79.84 LONG TERM (CURRENT) USE OF ORAL HYPOGLYCEMIC DRUGS: ICD-10-CM

## 2021-05-03 DIAGNOSIS — E03.8 SUBCLINICAL HYPOTHYROIDISM: ICD-10-CM

## 2021-05-03 DIAGNOSIS — E11.22 CONTROLLED TYPE 2 DIABETES MELLITUS WITH CHRONIC KIDNEY DISEASE ON CHRONIC DIALYSIS, WITHOUT LONG-TERM CURRENT USE OF INSULIN (HCC): ICD-10-CM

## 2021-05-03 DIAGNOSIS — E34.9 NEUROPATHY ASSOCIATED WITH ENDOCRINE DISORDER (HCC): ICD-10-CM

## 2021-05-03 PROCEDURE — 99214 OFFICE O/P EST MOD 30 MIN: CPT | Performed by: NURSE PRACTITIONER

## 2021-05-03 PROCEDURE — 99213 OFFICE O/P EST LOW 20 MIN: CPT | Performed by: NURSE PRACTITIONER

## 2021-05-03 RX ORDER — SEVELAMER HYDROCHLORIDE 800 MG/1
800 TABLET, FILM COATED ORAL
COMMUNITY
End: 2021-05-31

## 2021-05-03 ASSESSMENT — FIBROSIS 4 INDEX: FIB4 SCORE: 3.79

## 2021-05-03 NOTE — PROGRESS NOTES
RN-CDE Note    Subjective:   Endocrinology Clinic Progress Note  PCP: ANGELITA Concepcion    HPI:  Tere Gallegos is a 70 y.o. old patient who is seen today for review of Type 2 Diabetes and Hypothyroidism.    DM:   Last A1c:   Lab Results   Component Value Date/Time    HBA1C 5.5 04/28/2021 09:37 AM      Previous A1c was 5.8 on 1/28/21  A1C GOAL: < 6    Diabetes Medications:   Actos 30 mg daily  Taking above medications as prescribed: yes  Taking daily ASA: Yes    Exercise: Walking all day long to help with leg pain  Diet: Eating small amounts and usually eats after dialysis  Patient's body mass index is 25.97 kg/m². Exercise and nutrition counseling were performed at this visit.    Glucose monitoring frequency: Not testing    Hypoglycemic episodes: no  Last Retinal Exam: 15 days ago had bette on eyes and May 11 has a follow up with [facility]   Daily Foot Exam: Yes   Foot Exam:  Monofilament: done  Monofilament testing with a 10 gram force: sensation intact: intact bilaterally  Visual Inspection: Feet without maceration, ulcers, fissures.  Pedal pulses: intact bilaterally   Lab Results   Component Value Date/Time    MALBCRT 6207 (H) 09/29/2020 10:55 AM    MICROALBUR 104.4 09/29/2020 10:55 AM      ACR Albumin/Creatinine Ratio goal <30   Currently Rx ACE/ARB: Yes   Dyslipidemia:  Lab Results   Component Value Date/Time    CHOLSTRLTOT 125 09/29/2020 10:56 AM    LDL 54 09/29/2020 10:56 AM    HDL 48 09/29/2020 10:56 AM    TRIGLYCERIDE 113 09/29/2020 10:56 AM       Currently Rx Statin: No it was stopped at her last appointment.      She  reports that she has never smoked. She has never used smokeless tobacco.      Plan:     Discussed and educated on:   - All medications, side effects and compliance (discussed carefully)  - Annual eye examinations at Ophthalmology  - Home glucose monitoring emphasized  - Weight control and daily exercise    Recommended medication changes: She would  like to try gabapentin for her leg pain.

## 2021-05-04 DIAGNOSIS — Z99.2 CONTROLLED TYPE 2 DIABETES MELLITUS WITH CHRONIC KIDNEY DISEASE ON CHRONIC DIALYSIS, WITHOUT LONG-TERM CURRENT USE OF INSULIN (HCC): ICD-10-CM

## 2021-05-04 DIAGNOSIS — N18.6 CONTROLLED TYPE 2 DIABETES MELLITUS WITH CHRONIC KIDNEY DISEASE ON CHRONIC DIALYSIS, WITHOUT LONG-TERM CURRENT USE OF INSULIN (HCC): ICD-10-CM

## 2021-05-04 DIAGNOSIS — E11.22 CONTROLLED TYPE 2 DIABETES MELLITUS WITH CHRONIC KIDNEY DISEASE ON CHRONIC DIALYSIS, WITHOUT LONG-TERM CURRENT USE OF INSULIN (HCC): ICD-10-CM

## 2021-05-04 RX ORDER — LISINOPRIL 40 MG/1
40 TABLET ORAL
Qty: 90 TABLET | Refills: 3 | Status: SHIPPED | OUTPATIENT
Start: 2021-05-05 | End: 2021-05-31

## 2021-05-04 NOTE — PROGRESS NOTES
CHIEF COMPLAINT: Patient is here for follow up of Type 2 Diabetes Mellitus.    HPI:     Tere Gallegos is a 70 y.o. female with for continued evaluation & treatment of the following:     Patient is Gambian-speaking only and an  has been provided for this appointment.  Her daughter drove her to this appointment but she is Gambian-speaking only as well and unable to assist.    1.  Type 2 Diabetes Mellitus   Current diabetes regimen:  Actos 30 mg daily.    Dr. Ly started patient on Trulicity several months ago but patient discontinued this as she prefers PO agents only. Patient reports good compliance. History of chronic diastolic congestive heart failure with a preserved EF of 60% she has not experienced any acute exacerbation.     Serum A1c 04/28/2021: 5.5%  Serum A1c 01/28/2021: 5.8%  These values are inaccurate secondary to hemodialysis 3 times per week.    Blood sugar logs 05/03/2021: Patient does not monitor home glucose.    Patient denies hypoglycemic events.  Patient states she is hypoglycemic aware.  Patient states dialysis does fatigue her but she has not experienced hypoglycemia around HD.    Weight is unchanged from prior appointment.    Diabetes Complications   Retinopathy: No known retinopathy.  Last eye exam: 5/2020 at [facility]  Neuropathy: Positive for paresthesia and numbness in bilateral lower extremities.  Currently taking Requip and has prescription for gabapentin that she has not started.. Denies any foot wounds.  Exercise: Minimal.  Diet: Fair.    Currently taking lisinopril 40 mg daily.  Current BP is well controlled at 108/60.      2.  Subclinical hypothyroidism   Currently taking levothyroxine 50 mcg daily which has been her medication for over 9 months.  Patient reports good compliance.  However patient taking thyroid hormone with food and other medications.  Patient denies taking iron, calcium and/or an acid supplements.  Patient denies cold intolerance, mental  fogginess and/or constipation.  Patient also denies palpitations, tremors and/or heat intolerance.     Ref. Range 4/28/2021 09:37   TSH Latest Ref Range: 0.380 - 5.330 uIU/mL 2.140   Free T-4 Latest Ref Range: 0.93 - 1.70 ng/dL 1.90 (H)         3.  Hyperlipidemia   No current statin therapy secondary to myalgias related to atorvastatin.       Ref. Range 9/29/2020 10:56   Cholesterol,Tot Latest Ref Range: 100 - 199 mg/dL 125   Triglycerides Latest Ref Range: 0 - 149 mg/dL 113   HDL Latest Ref Range: >=40 mg/dL 48   LDL Latest Ref Range: <100 mg/dL 54           Patient's medications, allergies, and social histories were reviewed and updated as appropriate.    ROS:     CONS:     No fever, no chills   EYES:     No diplopia, no blurry vision   CV:           No chest pain, no palpitations   PULM:     No SOB, no cough, no hemoptysis.   GI:            No nausea, no vomiting, no diarrhea, no constipation   ENDO:     No polyuria, no polydipsia, no heat intolerance, no cold intolerance       Past Medical History:  Problem List:  2021-01: Right leg pain  2020-11: Acute hypoxemic respiratory failure due to COVID-19 ([facility])  2020-11: Pneumonia due to COVID-19 virus  2020-11: Pancytopenia ([facility])  2020-09: Numbness and tingling in both hands  2020-09: Anuria  2020-09: Anxiety  2020-07: Subclinical hypothyroidism  2020-07: Long term (current) use of oral hypoglycemic drugs  2020-05: Chronic diastolic heart failure ([facility])  2020-05: ESRD (end stage renal disease) on dialysis ([facility])  2020-05: Acquired hypothyroidism  2020-05: Dental disorder  2020-01: QT prolongation  2019-11: Mixed hyperlipidemia  2018-12: Elevated troponin  2016-09: Encounter for cervical Pap smear with pelvic exam  2016-09: Encounter to establish care with new doctor  2016-08: RLS (restless legs syndrome)  2016-08: Controlled type 2 diabetes mellitus with chronic kidney   disease on chronic dialysis, without long-term current use of insulin   ([facility])  2014-01:  Hyperkalemia  2013-09: Essential hypertension  Kidney transplant candidate  Coronary artery disease due to lipid rich plaque  Presence of drug-eluting stent in right coronary artery      Past Surgical History:  Past Surgical History:   Procedure Laterality Date   • ZZZ CARDIAC CATH  9/7/2016    RCA stented with 2 Synergy drug-eluting stents.   • RECOVERY  8/16/2016    Procedure: CATH LAB [facility] WITH POSSIBLE DR. CASTILLO;  Surgeon: Recoveryonly Surgery;  Location: SURGERY PRE-POST PROC UNIT [facility];  Service:    • Z CARDIAC CATH  8/16/16    100% RCA   • OTHER Left 2014    left arm upper extremity fistula   • OTHER ABDOMINAL SURGERY      left kidney removed due to cancer        Allergies:  Patient has no known allergies.     Social History:  Social History     Tobacco Use   • Smoking status: Never Smoker   • Smokeless tobacco: Never Used   Substance Use Topics   • Alcohol use: No     Alcohol/week: 0.0 oz   • Drug use: No        Family History:   family history includes Diabetes in her brother and sister; Other in her sister.      PHYSICAL EXAM:   OBJECTIVE:  Vital signs: /60   Pulse 64   Ht 1.524 m (5')   Wt 60.3 kg (133 lb)   LMP  (LMP Unknown)   SpO2 97%   BMI 25.97 kg/m²   GENERAL: Well-developed, well-nourished in no apparent distress.   EYE:  No ocular asymmetry, PERRLA  HENT: Pink, moist mucous membranes.    NECK: No thyromegaly.   CARDIOVASCULAR:  No murmurs, left AV fistula good bruit and thrill.  LUNGS: Clear breath sounds  ABDOMEN: Soft, nontender   EXTREMITIES: No clubbing, cyanosis, or edema.   NEUROLOGICAL: No gross focal motor abnormalities   LYMPH: No cervical adenopathy palpated.   SKIN: No rashes, lesions.       ASSESSMENT/PLAN:     1. Controlled type 2 diabetes mellitus with chronic kidney disease on chronic dialysis, without long-term current use of insulin (HCC)  Unstable.  True glycohemoglobin level cannot be determined currently.  Patient does not monitor home glucose or write down  these details for us to further evaluate.  Will check glycohemoglobin level prior to next appointment.  Continue pioglitazone 30 mg daily.    Continue balanced meal planning as per nephrology recommendations.  Follow fluid restriction guidelines as per nephrology recommendations.  Daily foot inspections are recommended.  Exercise as able.  Dilated eye exam is due this month.  Patient to make that appointment.      2. Subclinical hypothyroidism  Unstable.  Continue levothyroxine 50 mcg daily.    3. Mixed hyperlipidemia  Stable.  Continue to monitor labs in the next 6 months.      4. Essential hypertension  Stable.  Continue lisinopril 40 mg daily.    5. Long term (current) use of oral hypoglycemic drugs  Stable.  Will reassess glycohemoglobin and blood sugars at next appointment.    6.  Neuropathic pain  Unstable.  Recommend starting gabapentin as prescribed by SAMMI Kumar.  Patient instructed to take 2 pills at bedtime and 1 pill in the morning.  Patient can continue taking Requip before dialysis.  Explained in detail to patient that both medications will increase fatigue.  Again patient should start this routine in the evening as she is going to bed.  And then reassess which medication she may need in the morning to reduce the paresthesias and numbness to her bilateral lower extremities.    Repeat labs in 3 months.  Next appointment in 3 months.    Thank you kindly for allowing me to participate in the diabetes care plan for this patient.    SAMMI Blue  05/03/2021    CC:   ANGELITA Concepcion

## 2021-05-31 DIAGNOSIS — Z99.2 CONTROLLED TYPE 2 DIABETES MELLITUS WITH CHRONIC KIDNEY DISEASE ON CHRONIC DIALYSIS, WITHOUT LONG-TERM CURRENT USE OF INSULIN (HCC): ICD-10-CM

## 2021-05-31 DIAGNOSIS — N18.6 ESRD (END STAGE RENAL DISEASE) ON DIALYSIS (HCC): ICD-10-CM

## 2021-05-31 DIAGNOSIS — Z99.2 ESRD (END STAGE RENAL DISEASE) ON DIALYSIS (HCC): ICD-10-CM

## 2021-05-31 DIAGNOSIS — I10 ESSENTIAL HYPERTENSION: ICD-10-CM

## 2021-05-31 DIAGNOSIS — N18.6 CONTROLLED TYPE 2 DIABETES MELLITUS WITH CHRONIC KIDNEY DISEASE ON CHRONIC DIALYSIS, WITHOUT LONG-TERM CURRENT USE OF INSULIN (HCC): ICD-10-CM

## 2021-05-31 DIAGNOSIS — E11.22 CONTROLLED TYPE 2 DIABETES MELLITUS WITH CHRONIC KIDNEY DISEASE ON CHRONIC DIALYSIS, WITHOUT LONG-TERM CURRENT USE OF INSULIN (HCC): ICD-10-CM

## 2021-05-31 RX ORDER — ROPINIROLE 1 MG/1
1 TABLET, FILM COATED ORAL 2 TIMES DAILY
COMMUNITY
Start: 2021-04-01 | End: 2021-06-16

## 2021-05-31 RX ORDER — FUROSEMIDE 80 MG
240 TABLET ORAL DAILY
Qty: 90 TABLET | Refills: 3 | Status: SHIPPED | OUTPATIENT
Start: 2021-05-31 | End: 2021-08-11

## 2021-05-31 RX ORDER — HYDRALAZINE HYDROCHLORIDE 100 MG/1
100 TABLET, FILM COATED ORAL 2 TIMES DAILY
Qty: 180 TABLET | Refills: 3 | Status: SHIPPED | OUTPATIENT
Start: 2021-05-31 | End: 2022-08-24

## 2021-05-31 RX ORDER — HYDRALAZINE HYDROCHLORIDE 100 MG/1
100 TABLET, FILM COATED ORAL 2 TIMES DAILY
COMMUNITY
End: 2021-05-31 | Stop reason: SDUPTHER

## 2021-05-31 RX ORDER — SEVELAMER CARBONATE 800 MG/1
1 TABLET, FILM COATED ORAL
COMMUNITY
Start: 2021-03-09 | End: 2021-06-16

## 2021-05-31 RX ORDER — ATORVASTATIN CALCIUM 20 MG/1
1 TABLET, FILM COATED ORAL
COMMUNITY
Start: 2021-03-07 | End: 2021-08-11

## 2021-05-31 RX ORDER — LISINOPRIL 40 MG/1
40 TABLET ORAL DAILY
Qty: 90 TABLET | Refills: 3 | Status: SHIPPED | OUTPATIENT
Start: 2021-05-31 | End: 2022-06-27

## 2021-05-31 NOTE — PROGRESS NOTES
Med rec done at dialysis. Updating local med record.  Refilled Lasix and hydralazine with dose change of lasix from 40mg daily (not enough) to 240mg PO daily. If patient doesn't urinate in response to 240mg dose of lasix, will discontinue.     Lisinopril was listed as 40mg PO BID in dialysis med record, updated to max dose of once daily.     Hydralazine 100mg PO BID refilled.      Priyank Villar MD  Nephrology

## 2021-06-02 ENCOUNTER — OFFICE VISIT (OUTPATIENT)
Dept: CARDIOLOGY | Facility: MEDICAL CENTER | Age: 71
End: 2021-06-02
Payer: MEDICARE

## 2021-06-02 VITALS
HEIGHT: 60 IN | HEART RATE: 58 BPM | SYSTOLIC BLOOD PRESSURE: 128 MMHG | OXYGEN SATURATION: 95 % | DIASTOLIC BLOOD PRESSURE: 58 MMHG | WEIGHT: 134.8 LBS | BODY MASS INDEX: 26.46 KG/M2 | RESPIRATION RATE: 16 BRPM

## 2021-06-02 DIAGNOSIS — N18.6 CONTROLLED TYPE 2 DIABETES MELLITUS WITH CHRONIC KIDNEY DISEASE ON CHRONIC DIALYSIS, WITHOUT LONG-TERM CURRENT USE OF INSULIN (HCC): ICD-10-CM

## 2021-06-02 DIAGNOSIS — Z95.5 PRESENCE OF DRUG-ELUTING STENT IN RIGHT CORONARY ARTERY: Chronic | ICD-10-CM

## 2021-06-02 DIAGNOSIS — E11.22 CONTROLLED TYPE 2 DIABETES MELLITUS WITH CHRONIC KIDNEY DISEASE ON CHRONIC DIALYSIS, WITHOUT LONG-TERM CURRENT USE OF INSULIN (HCC): ICD-10-CM

## 2021-06-02 DIAGNOSIS — Z99.2 CONTROLLED TYPE 2 DIABETES MELLITUS WITH CHRONIC KIDNEY DISEASE ON CHRONIC DIALYSIS, WITHOUT LONG-TERM CURRENT USE OF INSULIN (HCC): ICD-10-CM

## 2021-06-02 DIAGNOSIS — I25.83 CORONARY ARTERY DISEASE DUE TO LIPID RICH PLAQUE: Chronic | ICD-10-CM

## 2021-06-02 DIAGNOSIS — I25.10 CORONARY ARTERY DISEASE DUE TO LIPID RICH PLAQUE: Chronic | ICD-10-CM

## 2021-06-02 DIAGNOSIS — Z01.810 PRE-OPERATIVE CARDIOVASCULAR EXAMINATION: ICD-10-CM

## 2021-06-02 DIAGNOSIS — I10 ESSENTIAL HYPERTENSION: ICD-10-CM

## 2021-06-02 PROCEDURE — 93000 ELECTROCARDIOGRAM COMPLETE: CPT | Performed by: INTERNAL MEDICINE

## 2021-06-02 PROCEDURE — 99214 OFFICE O/P EST MOD 30 MIN: CPT | Performed by: PHYSICIAN ASSISTANT

## 2021-06-02 ASSESSMENT — ENCOUNTER SYMPTOMS
DIZZINESS: 0
ORTHOPNEA: 0
DIAPHORESIS: 0
NAUSEA: 0
FEVER: 0
BLURRED VISION: 0
ABDOMINAL PAIN: 0
VOMITING: 0
DECREASED APPETITE: 0
NEAR-SYNCOPE: 0
BRUISES/BLEEDS EASILY: 0
SHORTNESS OF BREATH: 0
WEAKNESS: 0
DYSPNEA ON EXERTION: 0
PALPITATIONS: 0
SYNCOPE: 0
SNORING: 0
MYALGIAS: 0
BLOATING: 0

## 2021-06-02 ASSESSMENT — FIBROSIS 4 INDEX: FIB4 SCORE: 3.79

## 2021-06-02 NOTE — PROGRESS NOTES
Cardiology Clinic Follow-up Note    Date of note:    6/2/2021  Primary Care Provider: ANGELITA Concepcion    Name:             Tere Gallegos  YOB: 1950  MRN:               [id number]    CC: Essential hypertension     Primary Cardiologist: Dr. Borjas     Patient HPI:   Tere Gallegos is a 70 y.o. female with PMH including CAD with NOEMI x 2 to the mid to the RCA in 2016, ESRD on HD M, W, F since 2014 (LUE AV fistula) followed by Dr. Villar?, resistant hypertension, RCC s/p L nephrectomy approx 17 years ago.      Interim History:  Ms. Kenyetta Gallegos was last seen in this cardiology office by Dr. Borjas in 6/2020.  At that time he changed her metoprolol to coreg and increased her hydralazine due to elevated BP.     She is planning to have a kidney transplant through [facility] in .  She recently saw Dr. Misael Whitt, Cardiologist, Dr. Nickolas Garay, nephrologist, Dr. Abhijeet redd on 5/26/2021.   Recommendations were to have updated echocardiogram and coronary angiogram.     I spoke with her son on the phone, he helps her to her appts and such.  He would prefer if she had the testing done at [facility] in [place] given the proximity.     She is a relatively poor historian and does not know much about her recent encounters with [facility].    Review of Systems   Constitutional: Negative for decreased appetite, diaphoresis, fever and malaise/fatigue.   Eyes: Negative for blurred vision.   Cardiovascular: Negative for chest pain, dyspnea on exertion, leg swelling, near-syncope, orthopnea, palpitations and syncope.   Respiratory: Negative for shortness of breath and snoring.    Hematologic/Lymphatic: Negative for bleeding problem. Does not bruise/bleed easily.   Skin: Negative for rash.   Musculoskeletal: Negative for joint pain and myalgias.   Gastrointestinal: Negative for bloating, abdominal pain, nausea and vomiting.   Genitourinary: Negative for frequency.   Neurological:  Negative for dizziness and weakness.        Past medical history, social history and family history reviewed.  No changes other than what is outlined in HPI.    Current Outpatient Medications   Medication Sig Dispense Refill   • furosemide (LASIX) 80 MG Tab Take 3 Tablets by mouth every day. 90 tablet 3   • lisinopril (PRINIVIL) 40 MG tablet Take 1 tablet by mouth every day. 90 tablet 3   • hydrALAZINE (APRESOLINE) 100 MG tablet Take 1 tablet by mouth 2 times a day. 180 tablet 3   • atorvastatin (LIPITOR) 20 MG Tab Take 1 tablet by mouth at bedtime.     • sevelamer carbonate (RENVELA) 800 MG Tab tablet Take 1 tablet by mouth 3 times a day with meals.     • ROPINIRole (REQUIP) 1 MG Tab Take 1 tablet by mouth 2 times a day.     • ARSLAN LOW DOSE 81 MG Chew Tab chewable tablet TOME FERNANDO TABLETA TODOS LOS MCCORMICK NOT COVERED OTC 90 tablet 0   • gabapentin (NEURONTIN) 100 MG Cap Take 100 mg by mouth three times weekly after dialysis and take 100 mg by mouth nightly before bed. 120 Cap 1   • pioglitazone (ACTOS) 30 MG Tab Take 1 Tab by mouth every day. 90 Tab 3   • levothyroxine (SYNTHROID) 50 MCG Tab Take 1 Tab by mouth Every morning on an empty stomach. 90 Tab 3   • Cholecalciferol (VITAMIN D3) 2000 UNIT Cap Take 1 Cap by mouth every day. (Patient not taking: Reported on 5/3/2021) 30 Cap 3   • carvedilol (COREG) 25 MG Tab Take 1 Tab by mouth 2 times a day, with meals. 180 Tab 3     No current facility-administered medications for this visit.       No Known Allergies      Physical Exam:  Ambulatory Vitals  /58 (BP Location: Left arm, Patient Position: Sitting, BP Cuff Size: Adult)   Pulse (!) 58   Resp 16   Ht 1.524 m (5')   Wt 61.1 kg (134 lb 12.8 oz)   SpO2 95%    BP Readings from Last 4 Encounters:   06/02/21 128/58   05/03/21 108/60   01/25/21 130/54   01/22/21 110/60       Weight/BMI: Body mass index is 26.33 kg/m².  Wt Readings from Last 4 Encounters:   06/02/21 61.1 kg (134 lb 12.8 oz)   05/03/21 60.3 kg  (133 lb)   01/25/21 59.9 kg (132 lb)   01/22/21 59.9 kg (132 lb)       General: no apparent distress  Lungs: normal effort, without crackles, no wheezing or rhonchi.  Heart: normal rate, regular rhythm, no murmur, no rub  Ext:  no lower extremity edema.  LUE AV fistula present.  Abdomen: soft, non tender, non distended,  Neurological: No focal deficits, no facial asymmetry.  Normal speech.  Psychiatric: Appropriate affect, alert and oriented x 3.   Skin: Warm extremities, no rash.      Lab Data Review:  Lab Results   Component Value Date/Time    CHOLSTRLTOT 125 09/29/2020 10:56 AM    LDL 54 09/29/2020 10:56 AM    HDL 48 09/29/2020 10:56 AM    TRIGLYCERIDE 113 09/29/2020 10:56 AM       Lab Results   Component Value Date/Time    SODIUM 134 (L) 04/28/2021 09:37 AM    POTASSIUM 3.8 04/28/2021 09:37 AM    CHLORIDE 93 (L) 04/28/2021 09:37 AM    CO2 30 04/28/2021 09:37 AM    GLUCOSE 89 04/28/2021 09:37 AM    BUN 11 04/28/2021 09:37 AM    CREATININE 3.30 (H) 04/28/2021 09:37 AM    BUNCREATRAT 8 (L) 01/28/2021 07:00 AM     Lab Results   Component Value Date/Time    ALKPHOSPHAT 80 04/28/2021 09:37 AM    ASTSGOT 17 04/28/2021 09:37 AM    ALTSGPT 12 04/28/2021 09:37 AM    TBILIRUBIN 0.4 04/28/2021 09:37 AM      Lab Results   Component Value Date/Time    WBC 1.4 (LL) 11/19/2020 04:53 AM         Cardiac Imaging and Procedures Review:      Personal interpretation of EKG dated 6/2/2021:   Sinus bradycardia, HR 56. LVH with lateral TWI (likely secondary changes), Qrsd 84, Qtc 483      Echo 1/23/20:  CONCLUSIONS  Compared to the images of the study done 12/23/18, RVSP previously   severely elevated, could not be assessed on the study, otherwise no   significant change.  Left ventricular ejection fraction is visually estimated to be 60%.  Mild concentric left ventricular hypertrophy.  Grade II diastolic dysfunction.  Moderately dilated left atrium.  Mild mitral regurgitation.  Unable to estimate pulmonary artery pressure due to an  inadequate   tricuspid regurgitant jet.  Trivial/physiologic pericardial fluid.    Stress Test 10/20/20:  Report   NUCLEAR IMAGING INTERPRETATION   Normal left ventricular perfusion with no fixed or reversible defects.    Normal left ventricular wall motion, with EF of 68%.    ECG INTERPRETATION   Negative stress ECG for ischemia.    [facility] 9/7/2016:  1.  LV was not entered.  Blood pressure was as mentioned above quite elevated   At the beginning of the case.  We did give her several doses of intracoronary   nitroglycerin throughout the case.  Blood pressures towards the end of the   case were in the 120-130 systolic.     2.  Single-vessel coronary artery disease.       The left main is a large caliber vessel that is angiographically free of disease.   It gave off a large left anterior descending artery and codominant left circumflex artery.   Left anterior descending artery is around 3 mm in diameter proximally.  It     gave off 2 medium-sized diagonal branches in proximal and mid segment.  It   has minor irregularity, but no significant disease seen.  Antegrade flow was   normal.  Left anterior descending artery provides some collaterals through   septal arcade and the posterior descending artery.       Left circumflex artery is a codominant system, is quite large, probably   about 4 mm in diameter proximally.  It gives rise to 2 or 3 large obtuse   marginal branches in the mid segment and several small medium-sized obtuse   marginal branches distally.  There is no significant disease in the left   circumflex artery or its major branches.     The right coronary artery is a medium sized vessel around 2.5 mm in diameter.    Proximally it is occluded after it give off a conus branch about 2 cm or so   from its origin with small bridging collateral and probable a micro channel into a   diffusely diseased [id number] right coronary artery and posterior   descending artery.  There was no significant PLV originating from  the right   coronary artery.     After intervention, the stented segment in mid right coronary artery has no residual   stenosis with IRIS-3 antegrade flow into moderately diseased distal right   coronary artery and posterior descending artery.    PostOp Diagnosis: s/p NOEMI 2.5 and 2.25 mm Synergy stents to mid RCA with 0% residual in stented segment, IRIS III      Assessment and Clinical Decision Makin   Essential hypertension   -    BP well controlled on current med regimen including coreg 25 BID, hydral 100 BID, lisin 40 daily    2   CAD s/p NOEMI to the RCA   -    Continue ASA for life  -    Continue statin and BB  -    Denies any angina symptoms.     3   ESRD   -    M, W, F at [facility]  -    I think she follows with Dr. Villar?    4   Diabetes mellitus type II, A1C 5.5    5   Dyslipidemia   -    Lipids at goal, continue atrova 20 daily.    6  Pre-op visit for possible kidney transplant.  -   I reviewed the recent office notes from Cardiology and transplant nephrologist.   -   They need echocardiogram and LHC/coronary angio - will order and schedule through her son, Trip.       Aster Metz PA-C     [facility]

## 2021-06-03 ENCOUNTER — TELEPHONE (OUTPATIENT)
Dept: CARDIOLOGY | Facility: MEDICAL CENTER | Age: 71
End: 2021-06-03

## 2021-06-03 LAB — EKG IMPRESSION: NORMAL

## 2021-06-03 NOTE — TELEPHONE ENCOUNTER
----- Message from Aster Metz P.A.-C. sent at 6/2/2021  4:20 PM PDT -----  Regarding: lhc/angio  Iker Becker,   Can you help get this lady scheduled for LHC/angio for pre-op for kidney transplant at [facility] in .   She is on HD (M, W, F) has a left upper ext fistula.   I think she follows with Dr. Villar, and dialyses at [facility].     She is Azeri speaking and frankly knew very little about her healthcare.  I spoke with her son, Trip Gallegos - he helps her with her appts and speaks English.  His # is [phone] and also in the demographics.      Can you please help schedule this?      Thanks,   Aster Metz PA-C  6/2/2021

## 2021-06-03 NOTE — TELEPHONE ENCOUNTER
Scheduled STAT echo for tmrw 6/4 check in 12:00 at [address] Called son Hayden at [phone] and left detailed msg re: time and location. Advised to call [phone] if any questions-dl

## 2021-06-03 NOTE — TELEPHONE ENCOUNTER
----- Message from Aster Metz P.A.-C. sent at 6/2/2021  4:20 PM PDT -----  Regarding: lhc/angio  Iker Becker,   Can you help get this lady scheduled for LHC/angio for pre-op for kidney transplant at [place] in .   She is on HD (M, W, F) has a left upper ext fistula.   I think she follows with Dr. Villar, and dialyses at [facility].     She is Azeri speaking and frankly knew very little about her healthcare.  I spoke with her son, Trip Gallegos - he helps her with her appts and speaks English.  His # is [phone] and also in the demographics.      Can you please help schedule this?      Thanks,   Aster Metz PA-C  6/2/2021

## 2021-06-03 NOTE — TELEPHONE ENCOUNTER
Pt saw  and she ordered a STAT ECHO. Pt is due to have a kidney transplant so she needs to be scheduled asap I called the son Hayden and left vm to call us so we can get her taken care of-dl

## 2021-06-03 NOTE — TELEPHONE ENCOUNTER
Patient is scheduled on 6-8-21 for a C w/poss with Dr. Espino. Patient was told to hold lasix and pioglitazone AM day of procedure and to check in at 7:30 for a 9:30 procedure. H&P was done on 6-2-21 by Aster Metz NP. Pre admit to call patient.

## 2021-06-04 ENCOUNTER — HOSPITAL ENCOUNTER (OUTPATIENT)
Dept: CARDIOLOGY | Facility: MEDICAL CENTER | Age: 71
End: 2021-06-04
Attending: PHYSICIAN ASSISTANT
Payer: MEDICARE

## 2021-06-04 DIAGNOSIS — I10 ESSENTIAL HYPERTENSION: ICD-10-CM

## 2021-06-04 DIAGNOSIS — Z01.810 PRE-OPERATIVE CARDIOVASCULAR EXAMINATION: ICD-10-CM

## 2021-06-04 LAB
LV EJECT FRACT  99904: 70
LV EJECT FRACT MOD 2C 99903: 71.54
LV EJECT FRACT MOD 4C 99902: 75.43
LV EJECT FRACT MOD BP 99901: 73.82

## 2021-06-04 PROCEDURE — 93306 TTE W/DOPPLER COMPLETE: CPT | Mod: 26 | Performed by: INTERNAL MEDICINE

## 2021-06-04 PROCEDURE — 93306 TTE W/DOPPLER COMPLETE: CPT

## 2021-06-07 ENCOUNTER — PRE-ADMISSION TESTING (OUTPATIENT)
Dept: ADMISSIONS | Facility: MEDICAL CENTER | Age: 71
End: 2021-06-07
Attending: INTERNAL MEDICINE
Payer: MEDICARE

## 2021-06-07 ENCOUNTER — TELEPHONE (OUTPATIENT)
Dept: CARDIOLOGY | Facility: MEDICAL CENTER | Age: 71
End: 2021-06-07

## 2021-06-07 DIAGNOSIS — Z01.812 PRE-OPERATIVE LABORATORY EXAMINATION: ICD-10-CM

## 2021-06-07 DIAGNOSIS — Z01.810 PRE-OPERATIVE CARDIOVASCULAR EXAMINATION: ICD-10-CM

## 2021-06-07 LAB
APTT PPP: 30 SEC (ref 24.7–36)
ERYTHROCYTE [DISTWIDTH] IN BLOOD BY AUTOMATED COUNT: 55.5 FL (ref 35.9–50)
HCT VFR BLD AUTO: 39.1 % (ref 37–47)
HGB BLD-MCNC: 12.3 G/DL (ref 12–16)
INR PPP: 0.98 (ref 0.87–1.13)
MCH RBC QN AUTO: 31.6 PG (ref 27–33)
MCHC RBC AUTO-ENTMCNC: 31.5 G/DL (ref 33.6–35)
MCV RBC AUTO: 100.5 FL (ref 81.4–97.8)
PLATELET # BLD AUTO: 145 K/UL (ref 164–446)
PMV BLD AUTO: 11.7 FL (ref 9–12.9)
PROTHROMBIN TIME: 13.3 SEC (ref 12–14.6)
RBC # BLD AUTO: 3.89 M/UL (ref 4.2–5.4)
SARS-COV+SARS-COV-2 AG RESP QL IA.RAPID: NOTDETECTED
SPECIMEN SOURCE: NORMAL
WBC # BLD AUTO: 2.4 K/UL (ref 4.8–10.8)

## 2021-06-07 PROCEDURE — 87426 SARSCOV CORONAVIRUS AG IA: CPT

## 2021-06-07 PROCEDURE — 80053 COMPREHEN METABOLIC PANEL: CPT

## 2021-06-07 PROCEDURE — 93005 ELECTROCARDIOGRAM TRACING: CPT

## 2021-06-07 PROCEDURE — 36415 COLL VENOUS BLD VENIPUNCTURE: CPT

## 2021-06-07 PROCEDURE — 85610 PROTHROMBIN TIME: CPT

## 2021-06-07 PROCEDURE — 85730 THROMBOPLASTIN TIME PARTIAL: CPT

## 2021-06-07 PROCEDURE — 85027 COMPLETE CBC AUTOMATED: CPT

## 2021-06-07 NOTE — OR NURSING
COVID-19 Pre-surgery screening:    Pt did not answer. Voice mail and call back number were left.

## 2021-06-08 ENCOUNTER — HOSPITAL ENCOUNTER (OUTPATIENT)
Facility: MEDICAL CENTER | Age: 71
End: 2021-06-08
Attending: INTERNAL MEDICINE | Admitting: INTERNAL MEDICINE
Payer: MEDICARE

## 2021-06-08 ENCOUNTER — APPOINTMENT (OUTPATIENT)
Dept: CARDIOLOGY | Facility: MEDICAL CENTER | Age: 71
End: 2021-06-08
Attending: PHYSICIAN ASSISTANT
Payer: MEDICARE

## 2021-06-08 VITALS
SYSTOLIC BLOOD PRESSURE: 119 MMHG | OXYGEN SATURATION: 94 % | HEART RATE: 66 BPM | BODY MASS INDEX: 29.13 KG/M2 | DIASTOLIC BLOOD PRESSURE: 37 MMHG | TEMPERATURE: 97.6 F | RESPIRATION RATE: 16 BRPM | HEIGHT: 60 IN | WEIGHT: 148.37 LBS

## 2021-06-08 DIAGNOSIS — I25.10 CORONARY ARTERY DISEASE DUE TO LIPID RICH PLAQUE: Chronic | ICD-10-CM

## 2021-06-08 DIAGNOSIS — I25.83 CORONARY ARTERY DISEASE DUE TO LIPID RICH PLAQUE: Chronic | ICD-10-CM

## 2021-06-08 DIAGNOSIS — Z01.810 PRE-OPERATIVE CARDIOVASCULAR EXAMINATION: ICD-10-CM

## 2021-06-08 LAB
ALBUMIN SERPL BCP-MCNC: 4 G/DL (ref 3.2–4.9)
ALBUMIN/GLOB SERPL: 1.4 G/DL
ALP SERPL-CCNC: 103 U/L (ref 30–99)
ALT SERPL-CCNC: 14 U/L (ref 2–50)
ANION GAP SERPL CALC-SCNC: 12 MMOL/L (ref 7–16)
AST SERPL-CCNC: 15 U/L (ref 12–45)
BILIRUB SERPL-MCNC: 0.2 MG/DL (ref 0.1–1.5)
BUN SERPL-MCNC: 34 MG/DL (ref 8–22)
CALCIUM SERPL-MCNC: 9.1 MG/DL (ref 8.5–10.5)
CHLORIDE SERPL-SCNC: 94 MMOL/L (ref 96–112)
CO2 SERPL-SCNC: 30 MMOL/L (ref 20–33)
CREAT SERPL-MCNC: 5.66 MG/DL (ref 0.5–1.4)
EKG IMPRESSION: NORMAL
GLOBULIN SER CALC-MCNC: 2.8 G/DL (ref 1.9–3.5)
GLUCOSE SERPL-MCNC: 204 MG/DL (ref 65–99)
POTASSIUM SERPL-SCNC: 4.4 MMOL/L (ref 3.6–5.5)
PROT SERPL-MCNC: 6.8 G/DL (ref 6–8.2)
SODIUM SERPL-SCNC: 136 MMOL/L (ref 135–145)

## 2021-06-08 PROCEDURE — 700101 HCHG RX REV CODE 250

## 2021-06-08 PROCEDURE — 700117 HCHG RX CONTRAST REV CODE 255: Performed by: INTERNAL MEDICINE

## 2021-06-08 PROCEDURE — 93458 L HRT ARTERY/VENTRICLE ANGIO: CPT | Mod: 26 | Performed by: INTERNAL MEDICINE

## 2021-06-08 PROCEDURE — A9270 NON-COVERED ITEM OR SERVICE: HCPCS | Performed by: INTERNAL MEDICINE

## 2021-06-08 PROCEDURE — 160002 HCHG RECOVERY MINUTES (STAT)

## 2021-06-08 PROCEDURE — 93010 ELECTROCARDIOGRAM REPORT: CPT | Mod: 59 | Performed by: INTERNAL MEDICINE

## 2021-06-08 PROCEDURE — 93458 L HRT ARTERY/VENTRICLE ANGIO: CPT

## 2021-06-08 PROCEDURE — 99152 MOD SED SAME PHYS/QHP 5/>YRS: CPT | Performed by: INTERNAL MEDICINE

## 2021-06-08 PROCEDURE — 700102 HCHG RX REV CODE 250 W/ 637 OVERRIDE(OP): Performed by: INTERNAL MEDICINE

## 2021-06-08 PROCEDURE — 700111 HCHG RX REV CODE 636 W/ 250 OVERRIDE (IP)

## 2021-06-08 RX ORDER — CARVEDILOL 25 MG/1
25 TABLET ORAL ONCE
Status: COMPLETED | OUTPATIENT
Start: 2021-06-08 | End: 2021-06-08

## 2021-06-08 RX ORDER — SODIUM CHLORIDE 9 MG/ML
INJECTION, SOLUTION INTRAVENOUS CONTINUOUS
Status: DISCONTINUED | OUTPATIENT
Start: 2021-06-08 | End: 2021-06-08 | Stop reason: HOSPADM

## 2021-06-08 RX ORDER — HEPARIN SODIUM 1000 [USP'U]/ML
INJECTION, SOLUTION INTRAVENOUS; SUBCUTANEOUS
Status: COMPLETED
Start: 2021-06-08 | End: 2021-06-08

## 2021-06-08 RX ORDER — LISINOPRIL 20 MG/1
40 TABLET ORAL ONCE
Status: COMPLETED | OUTPATIENT
Start: 2021-06-08 | End: 2021-06-08

## 2021-06-08 RX ORDER — HEPARIN SODIUM 200 [USP'U]/100ML
INJECTION, SOLUTION INTRAVENOUS
Status: COMPLETED
Start: 2021-06-08 | End: 2021-06-08

## 2021-06-08 RX ORDER — VERAPAMIL HYDROCHLORIDE 2.5 MG/ML
INJECTION, SOLUTION INTRAVENOUS
Status: COMPLETED
Start: 2021-06-08 | End: 2021-06-08

## 2021-06-08 RX ORDER — LIDOCAINE HYDROCHLORIDE 20 MG/ML
INJECTION, SOLUTION INFILTRATION; PERINEURAL
Status: COMPLETED
Start: 2021-06-08 | End: 2021-06-08

## 2021-06-08 RX ORDER — HYDRALAZINE HYDROCHLORIDE 50 MG/1
100 TABLET, FILM COATED ORAL ONCE
Status: COMPLETED | OUTPATIENT
Start: 2021-06-08 | End: 2021-06-08

## 2021-06-08 RX ORDER — SODIUM CHLORIDE 9 MG/ML
INJECTION, SOLUTION INTRAVENOUS
Status: DISCONTINUED | OUTPATIENT
Start: 2021-06-08 | End: 2021-06-08 | Stop reason: HOSPADM

## 2021-06-08 RX ORDER — MIDAZOLAM HYDROCHLORIDE 1 MG/ML
INJECTION INTRAMUSCULAR; INTRAVENOUS
Status: COMPLETED
Start: 2021-06-08 | End: 2021-06-08

## 2021-06-08 RX ADMIN — HYDRALAZINE HYDROCHLORIDE 100 MG: 50 TABLET, FILM COATED ORAL at 11:41

## 2021-06-08 RX ADMIN — LIDOCAINE HYDROCHLORIDE: 20 INJECTION, SOLUTION INFILTRATION; PERINEURAL at 10:04

## 2021-06-08 RX ADMIN — NITROGLYCERIN 10 ML: 20 INJECTION INTRAVENOUS at 10:04

## 2021-06-08 RX ADMIN — IOHEXOL 41 ML: 350 INJECTION, SOLUTION INTRAVENOUS at 09:45

## 2021-06-08 RX ADMIN — CARVEDILOL 25 MG: 25 TABLET, FILM COATED ORAL at 11:42

## 2021-06-08 RX ADMIN — LISINOPRIL 40 MG: 20 TABLET ORAL at 11:40

## 2021-06-08 RX ADMIN — FENTANYL CITRATE 75 MCG: 50 INJECTION, SOLUTION INTRAMUSCULAR; INTRAVENOUS at 10:16

## 2021-06-08 RX ADMIN — MIDAZOLAM HYDROCHLORIDE 2 MG: 1 INJECTION, SOLUTION INTRAMUSCULAR; INTRAVENOUS at 10:16

## 2021-06-08 RX ADMIN — VERAPAMIL HYDROCHLORIDE 2.5 MG: 2.5 INJECTION, SOLUTION INTRAVENOUS at 10:04

## 2021-06-08 RX ADMIN — HEPARIN SODIUM: 200 INJECTION, SOLUTION INTRAVENOUS at 09:30

## 2021-06-08 RX ADMIN — HEPARIN SODIUM: 1000 INJECTION, SOLUTION INTRAVENOUS; SUBCUTANEOUS at 10:04

## 2021-06-08 ASSESSMENT — PAIN DESCRIPTION - PAIN TYPE: TYPE: ACUTE PAIN

## 2021-06-08 ASSESSMENT — FIBROSIS 4 INDEX: FIB4 SCORE: 1.96

## 2021-06-08 NOTE — DISCHARGE INSTRUCTIONS
ACTIVITY: Rest and take it easy for the first 24 hours.  A responsible adult is recommended to remain with you during that time.  It is normal to feel sleepy.  We encourage you to not do anything that requires balance, judgment or coordination.    MILD FLU-LIKE SYMPTOMS ARE NORMAL. YOU MAY EXPERIENCE GENERALIZED MUSCLE ACHES, THROAT IRRITATION, HEADACHE AND/OR SOME NAUSEA.    FOR 24 HOURS DO NOT:  Drive, operate machinery or run household appliances.  Drink beer or alcoholic beverages.   Make important decisions or sign legal documents.    SPECIAL INSTRUCTIONS: Follow up with your cardiologist call find out when to follow up.    DIET: To avoid nausea, slowly advance diet as tolerated, avoiding spicy or greasy foods for the first day.  Add more substantial food to your diet according to your physician's instructions.  Babies can be fed formula or breast milk as soon as they are hungry.  INCREASE FLUIDS AND FIBER TO AVOID CONSTIPATION.    SURGICAL DRESSING/BATHING: Keep dressing clean dry intact for 24 hours. Remove dressing after 24 hours. Ok to shower after 24 hours.     FOLLOW-UP APPOINTMENT:  A follow-up appointment should be arranged with your doctor in [phone]; call to schedule.    You should CALL YOUR PHYSICIAN if you develop:  Fever greater than 101 degrees F.  Pain not relieved by medication, or persistent nausea or vomiting.  Excessive bleeding (blood soaking through dressing) or unexpected drainage from the wound.  Extreme redness or swelling around the incision site, drainage of pus or foul smelling drainage.  Inability to urinate or empty your bladder within 8 hours.  Problems with breathing or chest pain.    You should call 911 if you develop problems with breathing or chest pain.  If you are unable to contact your doctor or surgical center, you should go to the nearest emergency room or urgent care center.  Physician's telephone #: [phone]    If any questions arise, call your doctor.  If your doctor  is not available, please feel free to call the Surgical Center at [phone]. The Contact Center is open Monday through Friday 7AM to 5PM and may speak to a nurse at [phone], or toll free at [phone].     A registered nurse may call you a few days after your surgery to see how you are doing after your procedure.    MEDICATIONS: Resume taking daily medication.  Take prescribed pain medication with food.  If no medication is prescribed, you may take non-aspirin pain medication if needed.  PAIN MEDICATION CAN BE VERY CONSTIPATING.  Take a stool softener or laxative such as senokot, pericolace, or milk of magnesia if needed.    If your physician has prescribed pain medication that includes Acetaminophen (Tylenol), do not take additional Acetaminophen (Tylenol) while taking the prescribed medication.    Depression / Suicide Risk    As you are discharged from this [facility] facility, it is important to learn how to keep safe from harming yourself.    Recognize the warning signs:  · Abrupt changes in personality, positive or negative- including increase in energy   · Giving away possessions  · Change in eating patterns- significant weight changes-  positive or negative  · Change in sleeping patterns- unable to sleep or sleeping all the time   · Unwillingness or inability to communicate  · Depression  · Unusual sadness, discouragement and loneliness  · Talk of wanting to die  · Neglect of personal appearance   · Rebelliousness- reckless behavior  · Withdrawal from people/activities they love  · Confusion- inability to concentrate     If you or a loved one observes any of these behaviors or has concerns about self-harm, here's what you can do:  · Talk about it- your feelings and reasons for harming yourself  · Remove any means that you might use to hurt yourself (examples: pills, rope, extension cords, firearm)  · Get professional help from the community (Mental Health, Substance Abuse, psychological  counseling)  · Do not be alone:Call your Safe Contact- someone whom you trust who will be there for you.  · Call your local CRISIS HOTLINE 034-5082 or 588-588-3723  · Call your local Children's Mobile Crisis Response Team [place] (246) 490-7839 or www.[employer]  · Call the toll free National Suicide Prevention Hotlines   · National Suicide Prevention Lifeline 100-294-VIQG (5872)  [facility] Line Network 800-SUICIDE (532-7807)  Radial Catherization Discharge Instructions      · Do not subject hand/arm to any forceful movements for 24 hours    i.e. supporting weight when rising from the chair or bed.   · Do not drive a car for 24 hours  · You may remove the dressing tomorrow  · You may shower on the day following your procedure.  Do not take a tub bath or submerge the puncture site in water for 3 days following the procedure.  · No Lifting more than 3-5 pounds with affected wrist for 5 days  · Follow up with your cardiologist in  2-4 weeks.  · Increase fluids for 2 days post procedure.  · Continue all previous medications unless otherwise instructed.    If bleeding should occur following discharge:  · Sit down and apply firm pressure to site with your fingers for 10 minutes  · If the bleeding stops, continue to sit quietly, keeping your wrist straight for 2 hours.  Notify physician as soon as possible ( [phone])  · If bleeding does not stop after 10 minutes, or if there is a large amount of bleeding or spurting, call 911 immediately.  Do not drive yourself to the hospital.

## 2021-06-08 NOTE — PROCEDURES
REFERRING PHYSICIAN: Dr. Borjas and Dr. Hernandez    PREOPERATIVE DIAGNOSIS:  1.  ESRD pending renal transplantation  2.  Prerenal transplant evaluation  3.  CAD status post prior RCA PCI    POSTOPERATIVE DIAGNOSIS:  1.  Nonobstructive coronary artery disease.  2.  Widely patent previously placed RCA stents  3.  LVEF 70%, LVEDP 22 mmHg      PROCEDURE PERFORMED:  Selective coronary angiography of the native vessels  Left heart catheterization  Left ventriculogram      DESCRIPTION OF PROCEDURE:  The risks and benefits of cardiac catheterization as well as the procedure itself, rationale and appropriateness were discussed with the patient today. Complications including but not limited to death, stroke, MI, urgent bypass surgery, contrast nephropathy, vascular complications, bleeding and infection were explained to the patient. The potential outcomes associated with the procedure (possible PCI, possible CABG, possible medical Rx only) were also discussed at length. The patient agreed to proceed.    The patient was transported to the catheterization laboratory in the fasting state. The right radial area and right groin were prepped and draped in the usual fashion. Right radial area was entered with a single through and through puncture under direct ultrasound guidance and a 6F glide sheath was placed. An intra-arterial cocktail of heparin, verapamil and nitroglycerine was administered. Over a wire, a 5F Dolores catheter was passed to the aortic root and used to engage the right and left coronaries without difficulty. Contrast was administered and multiple images obtained. This catheter was then used to cross the aortic valve for LHC and contrast was administered at 8cc/s for 24cc's for left ventriculogram. All catheters and guidewires were removed and a TR band was applied to achieve patent hemostasis. Patient left the cath lab in stable condition.      Moderate sedation directly monitored by me during the case while supervising  the administration of the sedation medication by an independent trained RN to assist in the monitoring of the patient's level of consciousness and physiological status. I, the supervising physician was present the entire time from beginning of medication administration until the end of the procedure from 10:03 AM until 10:21 AM. For detailed administration records please see the moderate sedation documentation in the median tab.      FINDINGS:  I. HEMODYNAMICS:    Ao: 110/54 mmHg   LEDP: 22 mmHg   Gradient on LV pullback: No    II. LEFT VENTRICULOGRAM:   LVEF JOHNSON PROJECTION: 70%     III. CORONARY ANGIOGRAPHY:  Left Main: Large caliber bifurcating no CAD.  Left Anterior Descending: Moderate to large caliber vessel with usual complement of diagonals.  Mild nonobstructive CAD.  Left Circumflex: Large caliber Co-dominant supplying multiple large tortuous obtuse marginals with mild nonobstructive CAD.  Right Coronary: Small caliber supplying a moderate-sized RPDA.  This is a codominant fashion.  There is a widely patent long segment of previously placed stents in the midportion.    COMPLICATIONS: none apparent    RECOMMENDATIONS:  Proceed with renal transplant evaluation.    Following the procedure I discussed the results with the patient and the patients designated contact/family member Manoj at [phone].

## 2021-06-08 NOTE — OR NURSING
1039 Patient arrived from cath lab post left heart cath. Right radial approach TR band RT radial clean. Patient awake. Denies pain, denies nausea. bp high will monitor and treat.  1057 message left to cath lab charge to notify Dr Espino of high bp.  1101 Order received from Dr Espino to give bp home meds.  1130 3 cc of air removed from TR band site clean  1145 3 cc of air removed from TR band site clean  1200 3 cc of air removed from TR band site clean  1215 3 cc of air removed from Tr  band site   1220 Tr band removed gauze and tegaderm applied no bleeding no hematoma.   1238 Discharge instructions given to family son verbalize understanding. Reviewed activity, worsening symptoms/management, follow up, medications, dressing care.   1240 Criteria met to discharge patient home.   1248 Patient escorted via w/c with all her personal belongings. Surgical site clean dry intact at time of discharge.

## 2021-06-16 ENCOUNTER — HOSPITAL ENCOUNTER (OUTPATIENT)
Facility: MEDICAL CENTER | Age: 71
End: 2021-06-17
Attending: EMERGENCY MEDICINE | Admitting: INTERNAL MEDICINE
Payer: MEDICARE

## 2021-06-16 ENCOUNTER — APPOINTMENT (OUTPATIENT)
Dept: RADIOLOGY | Facility: MEDICAL CENTER | Age: 71
End: 2021-06-16
Attending: EMERGENCY MEDICINE
Payer: MEDICARE

## 2021-06-16 DIAGNOSIS — R01.1 HEART MURMUR: ICD-10-CM

## 2021-06-16 DIAGNOSIS — G25.81 RESTLESS LEG: ICD-10-CM

## 2021-06-16 DIAGNOSIS — R07.9 CHEST PAIN, UNSPECIFIED TYPE: ICD-10-CM

## 2021-06-16 LAB
ALBUMIN SERPL BCP-MCNC: 4.2 G/DL (ref 3.2–4.9)
ALBUMIN/GLOB SERPL: 1.5 G/DL
ALP SERPL-CCNC: 77 U/L (ref 30–99)
ALT SERPL-CCNC: 12 U/L (ref 2–50)
ANION GAP SERPL CALC-SCNC: 12 MMOL/L (ref 7–16)
AST SERPL-CCNC: 15 U/L (ref 12–45)
BASOPHILS # BLD AUTO: 0.4 % (ref 0–1.8)
BASOPHILS # BLD: 0.01 K/UL (ref 0–0.12)
BILIRUB SERPL-MCNC: 0.4 MG/DL (ref 0.1–1.5)
BUN SERPL-MCNC: 14 MG/DL (ref 8–22)
CALCIUM SERPL-MCNC: 9.4 MG/DL (ref 8.4–10.2)
CHLORIDE SERPL-SCNC: 94 MMOL/L (ref 96–112)
CO2 SERPL-SCNC: 30 MMOL/L (ref 20–33)
CREAT SERPL-MCNC: 3.85 MG/DL (ref 0.5–1.4)
EKG IMPRESSION: NORMAL
EKG IMPRESSION: NORMAL
EOSINOPHIL # BLD AUTO: 0.03 K/UL (ref 0–0.51)
EOSINOPHIL NFR BLD: 1.2 % (ref 0–6.9)
ERYTHROCYTE [DISTWIDTH] IN BLOOD BY AUTOMATED COUNT: 51.7 FL (ref 35.9–50)
GLOBULIN SER CALC-MCNC: 2.8 G/DL (ref 1.9–3.5)
GLUCOSE SERPL-MCNC: 89 MG/DL (ref 65–99)
HCT VFR BLD AUTO: 37.2 % (ref 37–47)
HGB BLD-MCNC: 11.9 G/DL (ref 12–16)
IMM GRANULOCYTES # BLD AUTO: 0.01 K/UL (ref 0–0.11)
IMM GRANULOCYTES NFR BLD AUTO: 0.4 % (ref 0–0.9)
LIPASE SERPL-CCNC: 75 U/L (ref 7–58)
LYMPHOCYTES # BLD AUTO: 0.5 K/UL (ref 1–4.8)
LYMPHOCYTES NFR BLD: 19.5 % (ref 22–41)
MCH RBC QN AUTO: 31.1 PG (ref 27–33)
MCHC RBC AUTO-ENTMCNC: 32 G/DL (ref 33.6–35)
MCV RBC AUTO: 97.1 FL (ref 81.4–97.8)
MONOCYTES # BLD AUTO: 0.3 K/UL (ref 0–0.85)
MONOCYTES NFR BLD AUTO: 11.7 % (ref 0–13.4)
NEUTROPHILS # BLD AUTO: 1.71 K/UL (ref 2–7.15)
NEUTROPHILS NFR BLD: 66.8 % (ref 44–72)
NRBC # BLD AUTO: 0 K/UL
NRBC BLD-RTO: 0 /100 WBC
PLATELET # BLD AUTO: 114 K/UL (ref 164–446)
PMV BLD AUTO: 11 FL (ref 9–12.9)
POTASSIUM SERPL-SCNC: 3.8 MMOL/L (ref 3.6–5.5)
PROT SERPL-MCNC: 7 G/DL (ref 6–8.2)
RBC # BLD AUTO: 3.83 M/UL (ref 4.2–5.4)
SARS-COV-2 RNA RESP QL NAA+PROBE: NOTDETECTED
SODIUM SERPL-SCNC: 136 MMOL/L (ref 135–145)
SPECIMEN SOURCE: NORMAL
TROPONIN T SERPL-MCNC: 55 NG/L (ref 6–19)
TROPONIN T SERPL-MCNC: 69 NG/L (ref 6–19)
WBC # BLD AUTO: 2.6 K/UL (ref 4.8–10.8)

## 2021-06-16 PROCEDURE — 84484 ASSAY OF TROPONIN QUANT: CPT

## 2021-06-16 PROCEDURE — 36415 COLL VENOUS BLD VENIPUNCTURE: CPT

## 2021-06-16 PROCEDURE — 83690 ASSAY OF LIPASE: CPT

## 2021-06-16 PROCEDURE — G0378 HOSPITAL OBSERVATION PER HR: HCPCS

## 2021-06-16 PROCEDURE — 96372 THER/PROPH/DIAG INJ SC/IM: CPT

## 2021-06-16 PROCEDURE — 700102 HCHG RX REV CODE 250 W/ 637 OVERRIDE(OP): Performed by: INTERNAL MEDICINE

## 2021-06-16 PROCEDURE — A9270 NON-COVERED ITEM OR SERVICE: HCPCS | Performed by: INTERNAL MEDICINE

## 2021-06-16 PROCEDURE — A9270 NON-COVERED ITEM OR SERVICE: HCPCS | Performed by: STUDENT IN AN ORGANIZED HEALTH CARE EDUCATION/TRAINING PROGRAM

## 2021-06-16 PROCEDURE — U0005 INFEC AGEN DETEC AMPLI PROBE: HCPCS

## 2021-06-16 PROCEDURE — 80053 COMPREHEN METABOLIC PANEL: CPT

## 2021-06-16 PROCEDURE — 700111 HCHG RX REV CODE 636 W/ 250 OVERRIDE (IP): Performed by: INTERNAL MEDICINE

## 2021-06-16 PROCEDURE — 93005 ELECTROCARDIOGRAM TRACING: CPT | Performed by: EMERGENCY MEDICINE

## 2021-06-16 PROCEDURE — 99285 EMERGENCY DEPT VISIT HI MDM: CPT

## 2021-06-16 PROCEDURE — U0003 INFECTIOUS AGENT DETECTION BY NUCLEIC ACID (DNA OR RNA); SEVERE ACUTE RESPIRATORY SYNDROME CORONAVIRUS 2 (SARS-COV-2) (CORONAVIRUS DISEASE [COVID-19]), AMPLIFIED PROBE TECHNIQUE, MAKING USE OF HIGH THROUGHPUT TECHNOLOGIES AS DESCRIBED BY CMS-2020-01-R: HCPCS

## 2021-06-16 PROCEDURE — 85025 COMPLETE CBC W/AUTO DIFF WBC: CPT

## 2021-06-16 PROCEDURE — 700102 HCHG RX REV CODE 250 W/ 637 OVERRIDE(OP): Performed by: STUDENT IN AN ORGANIZED HEALTH CARE EDUCATION/TRAINING PROGRAM

## 2021-06-16 PROCEDURE — 99220 PR INITIAL OBSERVATION CARE,LEVL III: CPT | Performed by: INTERNAL MEDICINE

## 2021-06-16 PROCEDURE — 71045 X-RAY EXAM CHEST 1 VIEW: CPT

## 2021-06-16 RX ORDER — ALPRAZOLAM 0.25 MG/1
0.25 TABLET ORAL NIGHTLY PRN
Status: DISCONTINUED | OUTPATIENT
Start: 2021-06-16 | End: 2021-06-17 | Stop reason: HOSPADM

## 2021-06-16 RX ORDER — LEVOTHYROXINE SODIUM 0.05 MG/1
50 TABLET ORAL
Status: DISCONTINUED | OUTPATIENT
Start: 2021-06-17 | End: 2021-06-17 | Stop reason: HOSPADM

## 2021-06-16 RX ORDER — DIPHENHYDRAMINE HCL 25 MG
25 TABLET ORAL NIGHTLY PRN
Status: DISCONTINUED | OUTPATIENT
Start: 2021-06-16 | End: 2021-06-17 | Stop reason: HOSPADM

## 2021-06-16 RX ORDER — HYDRALAZINE HYDROCHLORIDE 25 MG/1
100 TABLET, FILM COATED ORAL 2 TIMES DAILY
Status: DISCONTINUED | OUTPATIENT
Start: 2021-06-16 | End: 2021-06-17 | Stop reason: HOSPADM

## 2021-06-16 RX ORDER — ROPINIROLE 0.5 MG/1
0.5 TABLET, FILM COATED ORAL ONCE
Status: COMPLETED | OUTPATIENT
Start: 2021-06-16 | End: 2021-06-16

## 2021-06-16 RX ORDER — SEVELAMER CARBONATE 800 MG/1
800 TABLET, FILM COATED ORAL
Status: DISCONTINUED | OUTPATIENT
Start: 2021-06-16 | End: 2021-06-17 | Stop reason: HOSPADM

## 2021-06-16 RX ORDER — LISINOPRIL 20 MG/1
40 TABLET ORAL DAILY
Status: DISCONTINUED | OUTPATIENT
Start: 2021-06-17 | End: 2021-06-17 | Stop reason: HOSPADM

## 2021-06-16 RX ORDER — ASPIRIN 81 MG/1
81 TABLET, CHEWABLE ORAL DAILY
Status: DISCONTINUED | OUTPATIENT
Start: 2021-06-17 | End: 2021-06-17 | Stop reason: HOSPADM

## 2021-06-16 RX ORDER — ATORVASTATIN CALCIUM 20 MG/1
20 TABLET, FILM COATED ORAL
Status: DISCONTINUED | OUTPATIENT
Start: 2021-06-16 | End: 2021-06-17 | Stop reason: HOSPADM

## 2021-06-16 RX ORDER — PREGABALIN 25 MG/1
25 CAPSULE ORAL 3 TIMES DAILY
Status: DISCONTINUED | OUTPATIENT
Start: 2021-06-16 | End: 2021-06-17 | Stop reason: HOSPADM

## 2021-06-16 RX ORDER — LABETALOL HYDROCHLORIDE 5 MG/ML
10 INJECTION, SOLUTION INTRAVENOUS EVERY 4 HOURS PRN
Status: DISCONTINUED | OUTPATIENT
Start: 2021-06-16 | End: 2021-06-17 | Stop reason: HOSPADM

## 2021-06-16 RX ORDER — CARVEDILOL 25 MG/1
25 TABLET ORAL 2 TIMES DAILY WITH MEALS
Status: DISCONTINUED | OUTPATIENT
Start: 2021-06-16 | End: 2021-06-17 | Stop reason: HOSPADM

## 2021-06-16 RX ORDER — HEPARIN SODIUM 5000 [USP'U]/ML
5000 INJECTION, SOLUTION INTRAVENOUS; SUBCUTANEOUS EVERY 8 HOURS
Status: DISCONTINUED | OUTPATIENT
Start: 2021-06-16 | End: 2021-06-17 | Stop reason: HOSPADM

## 2021-06-16 RX ORDER — PREGABALIN 25 MG/1
25 CAPSULE ORAL 3 TIMES DAILY
Status: DISCONTINUED | OUTPATIENT
Start: 2021-06-17 | End: 2021-06-16

## 2021-06-16 RX ADMIN — ATORVASTATIN CALCIUM 20 MG: 20 TABLET, FILM COATED ORAL at 21:35

## 2021-06-16 RX ADMIN — DIPHENHYDRAMINE HCL 25 MG: 25 TABLET ORAL at 21:35

## 2021-06-16 RX ADMIN — CARVEDILOL 25 MG: 25 TABLET, FILM COATED ORAL at 19:54

## 2021-06-16 RX ADMIN — PREGABALIN 25 MG: 25 CAPSULE ORAL at 21:46

## 2021-06-16 RX ADMIN — ROPINIROLE HYDROCHLORIDE 0.5 MG: 0.5 TABLET, FILM COATED ORAL at 16:06

## 2021-06-16 RX ADMIN — SEVELAMER CARBONATE 800 MG: 800 TABLET, FILM COATED ORAL at 19:54

## 2021-06-16 RX ADMIN — HEPARIN SODIUM 5000 UNITS: 5000 INJECTION, SOLUTION INTRAVENOUS; SUBCUTANEOUS at 21:36

## 2021-06-16 RX ADMIN — HYDRALAZINE HYDROCHLORIDE 100 MG: 25 TABLET, FILM COATED ORAL at 19:54

## 2021-06-16 ASSESSMENT — ENCOUNTER SYMPTOMS
BACK PAIN: 0
FEVER: 0
SHORTNESS OF BREATH: 0
NAUSEA: 0
COUGH: 0
DIZZINESS: 0
ABDOMINAL PAIN: 0

## 2021-06-16 ASSESSMENT — LIFESTYLE VARIABLES
TOTAL SCORE: 0
HAVE YOU EVER FELT YOU SHOULD CUT DOWN ON YOUR DRINKING: NO
TOTAL SCORE: 0
EVER HAD A DRINK FIRST THING IN THE MORNING TO STEADY YOUR NERVES TO GET RID OF A HANGOVER: NO
HAVE PEOPLE ANNOYED YOU BY CRITICIZING YOUR DRINKING: NO
AVERAGE NUMBER OF DAYS PER WEEK YOU HAVE A DRINK CONTAINING ALCOHOL: 0
CONSUMPTION TOTAL: NEGATIVE
ON A TYPICAL DAY WHEN YOU DRINK ALCOHOL HOW MANY DRINKS DO YOU HAVE: 0
EVER FELT BAD OR GUILTY ABOUT YOUR DRINKING: NO
TOTAL SCORE: 0
ALCOHOL_USE: NO
HOW MANY TIMES IN THE PAST YEAR HAVE YOU HAD 5 OR MORE DRINKS IN A DAY: 0

## 2021-06-16 ASSESSMENT — COGNITIVE AND FUNCTIONAL STATUS - GENERAL
STANDING UP FROM CHAIR USING ARMS: A LITTLE
TURNING FROM BACK TO SIDE WHILE IN FLAT BAD: A LITTLE
DRESSING REGULAR LOWER BODY CLOTHING: A LITTLE
MOVING TO AND FROM BED TO CHAIR: A LITTLE
MOBILITY SCORE: 18
DRESSING REGULAR UPPER BODY CLOTHING: A LITTLE
SUGGESTED CMS G CODE MODIFIER MOBILITY: CK
MOVING FROM LYING ON BACK TO SITTING ON SIDE OF FLAT BED: A LITTLE
PERSONAL GROOMING: A LITTLE
CLIMB 3 TO 5 STEPS WITH RAILING: A LITTLE
HELP NEEDED FOR BATHING: A LITTLE
TOILETING: A LITTLE
EATING MEALS: A LITTLE
WALKING IN HOSPITAL ROOM: A LITTLE
DAILY ACTIVITIY SCORE: 18
SUGGESTED CMS G CODE MODIFIER DAILY ACTIVITY: CK

## 2021-06-16 ASSESSMENT — FIBROSIS 4 INDEX
FIB4 SCORE: 1.96
FIB4 SCORE: 2.7

## 2021-06-16 ASSESSMENT — PAIN DESCRIPTION - PAIN TYPE
TYPE: ACUTE PAIN
TYPE: ACUTE PAIN

## 2021-06-16 ASSESSMENT — PATIENT HEALTH QUESTIONNAIRE - PHQ9
2. FEELING DOWN, DEPRESSED, IRRITABLE, OR HOPELESS: NOT AT ALL
SUM OF ALL RESPONSES TO PHQ9 QUESTIONS 1 AND 2: 0
1. LITTLE INTEREST OR PLEASURE IN DOING THINGS: NOT AT ALL

## 2021-06-16 NOTE — ED PROVIDER NOTES
ED Provider Note    CHIEF COMPLAINT  Chief Complaint   Patient presents with   • Chest Pain     Chest pain started after dialysis today       HPI  Tere Gallegos is a 70 y.o. female with history of diabetes, dyslipidemia, hypertension, coronary artery disease, CHF, thyroid disorder, and end-stage renal disease on hemodialysis who presents complaining of chest pain.    Patient states her chest discomfort began at the end of her dialysis session today.  She felt very fatigued, noted a fatigue in her chest that radiated into her left arm.  This lasted for 15 minutes.  She denies shortness of breath, nausea, vomiting, diaphoresis.  She has never had these symptoms before. She denies fever, chills, leg swelling, calf pain, history of PE/DVT.  She is currently symptom-free.      ALLERGIES  No Known Allergies    CURRENT MEDICATIONS  Home Medications    **Home medications have not yet been reviewed for this encounter**         PAST MEDICAL HISTORY   has a past medical history of Acquired hypothyroidism (5/4/2020), CAD (coronary artery disease), Chronic diastolic heart failure (HCC) (5/4/2020), Coronary artery disease due to lipid rich plaque, Dental disorder, Diabetes ([facility]), ESRD (end stage renal disease) on dialysis ([facility]) (5/4/2020), Hemodialysis patient ([facility]), Hyperlipidemia, Hypertension, Kidney transplant candidate, Presence of drug-eluting stent in right coronary artery, QT prolongation (1/22/2020), RLS (restless legs syndrome) (8/5/2016), and Transaminitis (12/22/2018).    SURGICAL HISTORY   has a past surgical history that includes recovery (8/16/2016); other abdominal surgery; other (Left, 2014); zzz cardiac cath (8/16/16); and zzz cardiac cath (9/7/2016).    SOCIAL HISTORY  Social History     Tobacco Use   • Smoking status: Never Smoker   • Smokeless tobacco: Never Used   Vaping Use   • Vaping Use: Never used   Substance and Sexual Activity   • Alcohol use: No     Alcohol/week: 0.0 oz   • Drug use: No    • Sexual activity: Never     Here with her son    REVIEW OF SYSTEMS  See HPI for further details.  All other systems are negative except as above in HPI.      PHYSICAL EXAM  VITAL SIGNS: /66   Pulse 65   Temp 36.4 °C (97.6 °F) (Temporal)   Resp 16   Ht 1.524 m (5')   Wt 61.2 kg (134 lb 14.7 oz)   LMP  (LMP Unknown)   SpO2 99%   BMI 26.35 kg/m²     General:  WD elderly  female with elevated BMI, nontoxic appearing in NAD; A+Ox3; V/S as above  Skin: warm and dry; good color; no rash  HEENT: NCAT; EOMs intact; no scleral icterus   Neck: FROM; no JVD  Cardiovascular: Regular heart rate and rhythm.  Grade III/VI systolic murmur; No rubs, or gallops; pulses 2+ bilaterally radially and DP areas  Lungs: Clear to auscultation with decreased air movement bilaterally.  No wheezes, rhonchi, or rales.   Abdomen: BS present; soft; NTND; no rebound, guarding, or rigidity.  No organomegaly or pulsatile mass  Extremities: COLLINS x 4; no e/o trauma; no pedal edema; neg Braulio's  Neurologic: CNs III-XII grossly intact; speech clear; distal sensation intact; strength 5/5 UE/LEs  Psychiatric: Appropriate affect, normal mood    LABS  Results for orders placed or performed during the hospital encounter of 06/16/21   CBC with Differential   Result Value Ref Range    WBC 2.6 (L) 4.8 - 10.8 K/uL    RBC 3.83 (L) 4.20 - 5.40 M/uL    Hemoglobin 11.9 (L) 12.0 - 16.0 g/dL    Hematocrit 37.2 37.0 - 47.0 %    MCV 97.1 81.4 - 97.8 fL    MCH 31.1 27.0 - 33.0 pg    MCHC 32.0 (L) 33.6 - 35.0 g/dL    RDW 51.7 (H) 35.9 - 50.0 fL    Platelet Count 114 (L) 164 - 446 K/uL    MPV 11.0 9.0 - 12.9 fL    Neutrophils-Polys 66.80 44.00 - 72.00 %    Lymphocytes 19.50 (L) 22.00 - 41.00 %    Monocytes 11.70 0.00 - 13.40 %    Eosinophils 1.20 0.00 - 6.90 %    Basophils 0.40 0.00 - 1.80 %    Immature Granulocytes 0.40 0.00 - 0.90 %    Nucleated RBC 0.00 /100 WBC    Neutrophils (Absolute) 1.71 (L) 2.00 - 7.15 K/uL    Lymphs (Absolute) 0.50 (L) 1.00  - 4.80 K/uL    Monos (Absolute) 0.30 0.00 - 0.85 K/uL    Eos (Absolute) 0.03 0.00 - 0.51 K/uL    Baso (Absolute) 0.01 0.00 - 0.12 K/uL    Immature Granulocytes (abs) 0.01 0.00 - 0.11 K/uL    NRBC (Absolute) 0.00 K/uL   Complete Metabolic Panel (CMP)   Result Value Ref Range    Sodium 136 135 - 145 mmol/L    Potassium 3.8 3.6 - 5.5 mmol/L    Chloride 94 (L) 96 - 112 mmol/L    Co2 30 20 - 33 mmol/L    Anion Gap 12.0 7.0 - 16.0    Glucose 89 65 - 99 mg/dL    Bun 14 8 - 22 mg/dL    Creatinine 3.85 (H) 0.50 - 1.40 mg/dL    Calcium 9.4 8.4 - 10.2 mg/dL    AST(SGOT) 15 12 - 45 U/L    ALT(SGPT) 12 2 - 50 U/L    Alkaline Phosphatase 77 30 - 99 U/L    Total Bilirubin 0.4 0.1 - 1.5 mg/dL    Albumin 4.2 3.2 - 4.9 g/dL    Total Protein 7.0 6.0 - 8.2 g/dL    Globulin 2.8 1.9 - 3.5 g/dL    A-G Ratio 1.5 g/dL   Troponin   Result Value Ref Range    Troponin T 55 (H) 6 - 19 ng/L   LIPASE   Result Value Ref Range    Lipase 75 (H) 7 - 58 U/L   ESTIMATED GFR   Result Value Ref Range    GFR If  14 (A) >60 mL/min/1.73 m 2    GFR If Non  12 (A) >60 mL/min/1.73 m 2   SARS-CoV-2 PCR (24 hour In-House): Collect NP swab in VTM    Specimen: Respirate   Result Value Ref Range    SARS-CoV-2 Source NP Swab    EKG   Result Value Ref Range    Report       [facility] Emergency Dept.    Test Date:  2021  Pt Name:    DIOR NICHOLS    Department: [facility]  MRN:        [id number]                      [facility]  Gender:     Female                       Technician: [id number]  :        1950                   Requested By:DOV FREDERICK  Order #:    [id number]                    Reading MD: DOV FREDERICK MD    Measurements  Intervals                                Axis  Rate:       65                           P:          -55  ID:         165                          QRS:        78  QRSD:       89                           T:          82  QT:         459  QTc:         478    Interpretive Statements  Sinus or ectopic atrial rhythm  Ventricular premature complex  Probable LVH with secondary repol abnrm  Compared to ECG 2021 16:36:08  Ectopic atrial rhythm now present  Ventricular premature complex(es) now present  Sinus rhythm no longer present  T-wave abnormality no longer present  Po ssible ischemia no longer present  Electronically Signed On 2021 10:04:02 PDT by DOV FREDERICK MD     EKG   Result Value Ref Range    Report       [facility] Emergency Dept.    Test Date:  2021  Pt Name:    DIOR NICHOLS    Department: [facility]  MRN:        [id number]                      [facility]  Gender:     Female                       Technician: HRR  :        1950                   Requested By:DOV FREDERICK  Order #:    [id number]                    Reading MD: DOV FREDERICK MD    Measurements  Intervals                                Axis  Rate:       58                           P:          -36  OK:         162                          QRS:        63  QRSD:       91                           T:          243  QT:         490  QTc:        482    Interpretive Statements  Sinus bradycardia  Probable LVH with secondary repol abnrm  Compared to ECG 2021 09:31:55  Ectopic atrial rhythm no longer present  Ventricular premature complex(es) no longer present  no acute ST changes  lateral t wave changes  Electronically Signed On 2021 11:12:06 PDT by DOV FREDERICK MD         IMAGING  DX-CHEST-PORTABLE (1 VIEW)   Final Result      Cardiomegaly with mild interstitial prominence.            MEDICAL RECORD  I have reviewed patient's medical record and pertinent results are listed below.      COURSE & MEDICAL DECISION MAKING  I have reviewed any medical record information, laboratory studies and radiographic results as noted.    Dior Nichols is a 70 y.o. female who presents complaining of chest pain  rating into her left arm that began at the end of her dialysis session today.  This lasted for 15 minutes.  She is currently symptom-free.  She had no associated symptoms.  Patient is a high risk for heart disease given history of coronary artery disease status post stent placement, diabetes, hypertension, dyslipidemia.  I do not suspect TAD or PE.  Patient does not appear volume overloaded.  Pt unaware of murmur being heard o PE in the past.      Appropriate PPE was worn at all times while interacting with the patient, including goggles, N95 mask, and surgical mask.    EKG demonstrates a PVC with no obvious ST elevation.    CXR demonstrates cardiomegaly with mild interstitial prominence.  No pulmonary edema or effusions.    Labs demonstrate leukopenia of 2.6 which appears to be chronically low, hemoglobin 11.9, creatinine 3.85, lipase 75, and troponin 55.    Nephrology paged/alerted about admission and aware.    I discussed the case with the hospitalist who agrees to admit the patient.      FINAL IMPRESSION  1. Chest pain, unspecified type     2. Heart murmur       Electronically signed by: Alisha Richmond M.D., 6/16/2021 10:29 AM

## 2021-06-16 NOTE — ED NOTES
Pharmacy Medication Reconciliation      Medication reconciliation updated and complete per pt at bedside with use of interperter:Tyron ID#[id number]  Allergies have been verified  No oral ABX within the last 14 days  Patient home pharmacy:[facility] [address]    Pt reports she receives Dialysis on Monday, Wednesday & Friday

## 2021-06-16 NOTE — ASSESSMENT & PLAN NOTE
Acute chest pressure with left arm radiation after dialysis  Troponin is up, but has ESRD. Likely demand  She just had cardiac cath and TTE recently by cardiology  Risk factors for PE is low  Will monitor on tele for arrhythmia and trend troponin overnight. If no further chest pain can likely dc tomorrow

## 2021-06-16 NOTE — DISCHARGE PLANNING
ER CM Met with pt at bedside. Pleasant. Agreeable with friends Ren at bedside assisting with translation as needed. She lives in upstairs apt with her son Aureliano. She has supportive children Hayden, Catie , Aureliano and Manoj, She thinks Manoj will be her ride home when Discharged and wanted his number added  PCP Kathy Melgar and RX at [facility].  No DME or O2.  Care Transition Team Assessment    Information Source  Orientation Level: Oriented X4  Information Given By: Patient (Friends assisted translations)  Informant's Name: Tere (ren friend)  Who is responsible for making decisions for patient? : Patient         Elopement Risk  Legal Hold: No    Interdisciplinary Discharge Planning  Primary Care Physician: ALEYDA Melgar  Lives with - Patient's Self Care Capacity: Adult Children  Support Systems: Children  Housing / Facility: 2 Story Apartment / Condo  Do You Take your Prescribed Medications Regularly: Yes  Able to Return to Previous ADL's: Yes  Mobility Issues: No  Prior Services: None  Patient Prefers to be Discharged to:: Home  Assistance Needed: No  Durable Medical Equipment: Not Applicable    Discharge Preparedness  What is your plan after discharge?: Uncertain - pending medical team collaboration, Home with help  What are your discharge supports?: Child  Prior Functional Level: Ambulatory, Independent with Activities of Daily Living, Independent with Medication Management    Functional Assesment  Prior Functional Level: Ambulatory, Independent with Activities of Daily Living, Independent with Medication Management    Finances  Prescription Coverage: Yes ([facility])                   Domestic Abuse  Have you ever been the victim of abuse or violence?: No         Discharge Risks or Barriers  Patient risk factors: Vulnerable adult    Anticipated Discharge Information  Discharge Disposition: Still a Patient (30)

## 2021-06-16 NOTE — H&P
[facility] Medicine History & Physical Note    Date of Service  6/16/2021    Primary Care Physician  MALGORZATA Concepcion.    Consultants  None    Code Status  Full    Chief Complaint  Chief Complaint   Patient presents with   • Chest Pain     Chest pain started after dialysis today       History of Presenting Illness  70 y.o. female who presented 6/16/2021 with DM, HTN, HLD, ESRD who presented with chest pain. She had finished a normal session of dialysis and as they removed the needle, she had chest tightness radiating to her left  And her arm felt weak. No SOB, dizziness, nausea. There was no pain with deep breath. Pain and weakness is now resolved. She has never felt this before.  Per ERP, troponin increased    Review of Systems  Review of Systems   Constitutional: Negative for fever.   Respiratory: Negative for cough and shortness of breath.    Cardiovascular: Positive for chest pain.   Gastrointestinal: Negative for abdominal pain and nausea.   Musculoskeletal: Negative for back pain.   Neurological: Negative for dizziness.   All other systems reviewed and are negative.      Past Medical History   has a past medical history of Acquired hypothyroidism (5/4/2020), CAD (coronary artery disease), Chronic diastolic heart failure (HCC) (5/4/2020), Coronary artery disease due to lipid rich plaque, Dental disorder, Diabetes ([facility]), ESRD (end stage renal disease) on dialysis ([facility]) (5/4/2020), Hemodialysis patient ([facility]), Hyperlipidemia, Hypertension, Kidney transplant candidate, Presence of drug-eluting stent in right coronary artery, QT prolongation (1/22/2020), RLS (restless legs syndrome) (8/5/2016), and Transaminitis (12/22/2018).    Surgical History   has a past surgical history that includes recovery (8/16/2016); other abdominal surgery; other (Left, 2014); [facility] cardiac cath (8/16/16); and [facility] cardiac cath (9/7/2016).     Family History  family history includes Diabetes in her brother and sister; Other in her  sister.     Social History   reports that she has never smoked. She has never used smokeless tobacco. She reports that she does not drink alcohol and does not use drugs.    Allergies  No Known Allergies    Medications  Prior to Admission Medications   Prescriptions Last Dose Informant Patient Reported? Taking?   ARSLAN LOW DOSE 81 MG Chew Tab chewable tablet   No No   Sig: DIOGO JIMENES LOS MCCORMICK NOT COVERED OTC   Cholecalciferol (VITAMIN D3) 2000 UNIT Cap   No No   Sig: Take 1 Cap by mouth every day.   Patient not taking: Reported on 5/3/2021   ROPINIRole (REQUIP) 1 MG Tab   Yes No   Sig: Take 1 tablet by mouth 2 times a day.   atorvastatin (LIPITOR) 20 MG Tab   Yes No   Sig: Take 1 tablet by mouth at bedtime.   carvedilol (COREG) 25 MG Tab  Rx Bottle (For Med Information) No No   Sig: Take 1 Tab by mouth 2 times a day, with meals.   furosemide (LASIX) 80 MG Tab   No No   Sig: Take 3 Tablets by mouth every day.   gabapentin (NEURONTIN) 100 MG Cap   No No   Sig: Take 100 mg by mouth three times weekly after dialysis and take 100 mg by mouth nightly before bed.   hydrALAZINE (APRESOLINE) 100 MG tablet   No No   Sig: Take 1 tablet by mouth 2 times a day.   levothyroxine (SYNTHROID) 50 MCG Tab   No No   Sig: Take 1 Tab by mouth Every morning on an empty stomach.   lisinopril (PRINIVIL) 40 MG tablet   No No   Sig: Take 1 tablet by mouth every day.   pioglitazone (ACTOS) 30 MG Tab   No No   Sig: Take 1 Tab by mouth every day.   sevelamer carbonate (RENVELA) 800 MG Tab tablet   Yes No   Sig: Take 1 tablet by mouth 3 times a day with meals.      Facility-Administered Medications: None       Physical Exam  Temp:  [36.4 °C (97.6 °F)] 36.4 °C (97.6 °F)  Pulse:  [65] 65  Resp:  [16] 16  BP: (157)/(66) 157/66  SpO2:  [99 %] 99 %    Physical Exam  Vitals and nursing note reviewed.   Constitutional:       General: She is not in acute distress.     Appearance: She is not toxic-appearing.   HENT:      Head: Normocephalic.       Mouth/Throat:      Mouth: Mucous membranes are moist.   Eyes:      General:         Right eye: No discharge.         Left eye: No discharge.   Cardiovascular:      Rate and Rhythm: Normal rate and regular rhythm.      Heart sounds: No murmur heard.   No friction rub. No gallop.    Pulmonary:      Effort: Pulmonary effort is normal. No respiratory distress.      Breath sounds: No wheezing or rales.   Abdominal:      Palpations: Abdomen is soft.      Tenderness: There is no abdominal tenderness.   Musculoskeletal:         General: No swelling.      Cervical back: Neck supple.      Comments: Left arm fistula with palpable pulse   Skin:     General: Skin is warm and dry.   Neurological:      Mental Status: She is alert and oriented to person, place, and time.         Laboratory:  Recent Labs     06/16/21  1000   WBC 2.6*   RBC 3.83*   HEMOGLOBIN 11.9*   HEMATOCRIT 37.2   MCV 97.1   MCH 31.1   MCHC 32.0*   RDW 51.7*   PLATELETCT 114*   MPV 11.0     Recent Labs     06/16/21  1000   SODIUM 136   POTASSIUM 3.8   CHLORIDE 94*   CO2 30   GLUCOSE 89   BUN 14   CREATININE 3.85*   CALCIUM 9.4     Recent Labs     06/16/21  1000   ALTSGPT 12   ASTSGOT 15   ALKPHOSPHAT 77   TBILIRUBIN 0.4   LIPASE 75*   GLUCOSE 89         No results for input(s): NTPROBNP in the last 72 hours.      Recent Labs     06/16/21  1000   TROPONINT 55*       Imaging:  DX-CHEST-PORTABLE (1 VIEW)   Final Result      Cardiomegaly with mild interstitial prominence.            Assessment/Plan:  I anticipate this patient is appropriate for observation status at this time.    * Pain in the chest  Assessment & Plan  Acute chest pressure with left arm radiation after dialysis  Troponin is up, but has ESRD. Likely demand  She just had cardiac cath and TTE recently by cardiology  Risk factors for PE is low  Will monitor on tele for arrhythmia and trend troponin overnight. If no further chest pain can likely dc tomorrow    Acquired hypothyroidism- (present on  admission)  Assessment & Plan  Cont synthroid    ESRD (end stage renal disease) on dialysis (HCC)- (present on admission)  Assessment & Plan  On dialysis, I expect her to dc tomorrow    Chronic diastolic heart failure (HCC)- (present on admission)  Assessment & Plan  Recently had TTE  Continue HTN meds    Coronary artery disease due to lipid rich plaque- (present on admission)  Assessment & Plan  Had pre-op cardiac cath 6/8, showed nonobstructive disease, stent was open    Mixed hyperlipidemia- (present on admission)  Assessment & Plan  Cont statin    Controlled type 2 diabetes mellitus with chronic kidney disease on chronic dialysis, without long-term current use of insulin (HCC)- (present on admission)  Assessment & Plan  Well controlled  a1c 5.5%  Monitor as needed

## 2021-06-16 NOTE — ED NOTES
ERP at bedside. Pt and son agrees with plan of care discussed by ERP. AIDET acknowledged with patient. IV established. Blood sent to lab. Dara in low position, side rail up for pt safety. Call light within reach. Will continue to monitor.   4

## 2021-06-17 VITALS
WEIGHT: 151.01 LBS | DIASTOLIC BLOOD PRESSURE: 38 MMHG | RESPIRATION RATE: 16 BRPM | TEMPERATURE: 98.1 F | OXYGEN SATURATION: 97 % | SYSTOLIC BLOOD PRESSURE: 141 MMHG | BODY MASS INDEX: 29.65 KG/M2 | HEIGHT: 60 IN | HEART RATE: 56 BPM

## 2021-06-17 LAB
ANION GAP SERPL CALC-SCNC: 14 MMOL/L (ref 7–16)
BASOPHILS # BLD AUTO: 0.8 % (ref 0–1.8)
BASOPHILS # BLD: 0.02 K/UL (ref 0–0.12)
BUN SERPL-MCNC: 31 MG/DL (ref 8–22)
CALCIUM SERPL-MCNC: 9 MG/DL (ref 8.4–10.2)
CHLORIDE SERPL-SCNC: 90 MMOL/L (ref 96–112)
CO2 SERPL-SCNC: 27 MMOL/L (ref 20–33)
CREAT SERPL-MCNC: 5.75 MG/DL (ref 0.5–1.4)
EOSINOPHIL # BLD AUTO: 0.03 K/UL (ref 0–0.51)
EOSINOPHIL NFR BLD: 1.2 % (ref 0–6.9)
ERYTHROCYTE [DISTWIDTH] IN BLOOD BY AUTOMATED COUNT: 51.5 FL (ref 35.9–50)
GLUCOSE SERPL-MCNC: 90 MG/DL (ref 65–99)
HCT VFR BLD AUTO: 34.4 % (ref 37–47)
HGB BLD-MCNC: 11.3 G/DL (ref 12–16)
IMM GRANULOCYTES # BLD AUTO: 0.01 K/UL (ref 0–0.11)
IMM GRANULOCYTES NFR BLD AUTO: 0.4 % (ref 0–0.9)
LYMPHOCYTES # BLD AUTO: 0.71 K/UL (ref 1–4.8)
LYMPHOCYTES NFR BLD: 28.5 % (ref 22–41)
MCH RBC QN AUTO: 32.1 PG (ref 27–33)
MCHC RBC AUTO-ENTMCNC: 32.8 G/DL (ref 33.6–35)
MCV RBC AUTO: 97.7 FL (ref 81.4–97.8)
MONOCYTES # BLD AUTO: 0.37 K/UL (ref 0–0.85)
MONOCYTES NFR BLD AUTO: 14.9 % (ref 0–13.4)
NEUTROPHILS # BLD AUTO: 1.35 K/UL (ref 2–7.15)
NEUTROPHILS NFR BLD: 54.2 % (ref 44–72)
NRBC # BLD AUTO: 0 K/UL
NRBC BLD-RTO: 0 /100 WBC
PLATELET # BLD AUTO: 119 K/UL (ref 164–446)
PMV BLD AUTO: 11.6 FL (ref 9–12.9)
POTASSIUM SERPL-SCNC: 4.6 MMOL/L (ref 3.6–5.5)
RBC # BLD AUTO: 3.52 M/UL (ref 4.2–5.4)
SODIUM SERPL-SCNC: 131 MMOL/L (ref 135–145)
TROPONIN T SERPL-MCNC: 64 NG/L (ref 6–19)
WBC # BLD AUTO: 2.5 K/UL (ref 4.8–10.8)

## 2021-06-17 PROCEDURE — 80048 BASIC METABOLIC PNL TOTAL CA: CPT

## 2021-06-17 PROCEDURE — 84484 ASSAY OF TROPONIN QUANT: CPT

## 2021-06-17 PROCEDURE — 96372 THER/PROPH/DIAG INJ SC/IM: CPT

## 2021-06-17 PROCEDURE — 700102 HCHG RX REV CODE 250 W/ 637 OVERRIDE(OP): Performed by: STUDENT IN AN ORGANIZED HEALTH CARE EDUCATION/TRAINING PROGRAM

## 2021-06-17 PROCEDURE — A9270 NON-COVERED ITEM OR SERVICE: HCPCS | Performed by: INTERNAL MEDICINE

## 2021-06-17 PROCEDURE — 700102 HCHG RX REV CODE 250 W/ 637 OVERRIDE(OP): Performed by: HOSPITALIST

## 2021-06-17 PROCEDURE — 85025 COMPLETE CBC W/AUTO DIFF WBC: CPT

## 2021-06-17 PROCEDURE — G0378 HOSPITAL OBSERVATION PER HR: HCPCS

## 2021-06-17 PROCEDURE — 700102 HCHG RX REV CODE 250 W/ 637 OVERRIDE(OP): Performed by: INTERNAL MEDICINE

## 2021-06-17 PROCEDURE — 99217 PR OBSERVATION CARE DISCHARGE: CPT | Performed by: HOSPITALIST

## 2021-06-17 PROCEDURE — A9270 NON-COVERED ITEM OR SERVICE: HCPCS | Performed by: STUDENT IN AN ORGANIZED HEALTH CARE EDUCATION/TRAINING PROGRAM

## 2021-06-17 PROCEDURE — 700111 HCHG RX REV CODE 636 W/ 250 OVERRIDE (IP): Performed by: INTERNAL MEDICINE

## 2021-06-17 PROCEDURE — A9270 NON-COVERED ITEM OR SERVICE: HCPCS | Performed by: HOSPITALIST

## 2021-06-17 RX ORDER — PREGABALIN 25 MG/1
25 CAPSULE ORAL 3 TIMES DAILY
Qty: 90 CAPSULE | Refills: 3 | Status: SHIPPED | OUTPATIENT
Start: 2021-06-17 | End: 2021-07-17

## 2021-06-17 RX ORDER — SEVELAMER CARBONATE 800 MG/1
800 TABLET, FILM COATED ORAL
Qty: 270 TABLET | Refills: 3 | Status: SHIPPED | OUTPATIENT
Start: 2021-06-17 | End: 2021-08-18 | Stop reason: SDUPTHER

## 2021-06-17 RX ADMIN — ALPRAZOLAM 0.25 MG: 0.25 TABLET ORAL at 00:04

## 2021-06-17 RX ADMIN — SEVELAMER CARBONATE 800 MG: 800 TABLET, FILM COATED ORAL at 07:56

## 2021-06-17 RX ADMIN — HYDRALAZINE HYDROCHLORIDE 100 MG: 25 TABLET, FILM COATED ORAL at 05:10

## 2021-06-17 RX ADMIN — CARVEDILOL 25 MG: 25 TABLET, FILM COATED ORAL at 07:57

## 2021-06-17 RX ADMIN — HEPARIN SODIUM 5000 UNITS: 5000 INJECTION, SOLUTION INTRAVENOUS; SUBCUTANEOUS at 05:10

## 2021-06-17 RX ADMIN — ASPIRIN 81 MG CHEWABLE TABLET 81 MG: 81 TABLET CHEWABLE at 05:10

## 2021-06-17 RX ADMIN — PREGABALIN 25 MG: 25 CAPSULE ORAL at 05:11

## 2021-06-17 RX ADMIN — LEVOTHYROXINE SODIUM 50 MCG: 0.05 TABLET ORAL at 05:11

## 2021-06-17 RX ADMIN — LISINOPRIL 40 MG: 20 TABLET ORAL at 05:11

## 2021-06-17 ASSESSMENT — FIBROSIS 4 INDEX: FIB4 SCORE: 2.58

## 2021-06-17 NOTE — PROGRESS NOTES
Brenda from Lab called with critical result of WBC 2.5 at 0117. Critical lab result read back to Brenda. Dr. Phillips notified via Voalte of critical lab result at 0120. Critical lab result read back by Dr. Phillips.

## 2021-06-17 NOTE — DISCHARGE SUMMARY
Discharge Summary    CHIEF COMPLAINT ON ADMISSION  Chief Complaint   Patient presents with   • Chest Pain     Chest pain started after dialysis today       Reason for Admission  chest pains, L Arm pain,      Admission Date  6/16/2021    CODE STATUS  Full Code    HPI & HOSPITAL COURSE  This is a 70 y.o. female here with chest pain after dialysis.  The patient initially was hypertensive.  The patient's blood pressure at this point has come under good control [facility]  Her chest pain resolved very quickly as her blood pressure came under control.  She has not had any chest pain overnight.  In reviewing her results her twelve-lead EKG shows a sinus bradycardia rate of 58 with LVH and no acute changes.  Her portable chest x-ray shows cardiomegaly with mild interstitial prominence otherwise no acute abnormalities.  Laboratory values show chronically elevated troponin levels initially 55 most recently 64.  These are consistent with chronic renal failure and do not indicate an acute infarction.  At this point renal functions are slightly elevated but within her baseline parameters.  Vitals at this point are stable.  Patient's overall condition is stable and at this point she can discharge home with outpatient follow-ups and monitoring.  I have spoken to her with an  given the fact that her primary language is Bulgarian.  The patient at this point had all her questions answered and at this point is stable and safe for discharge.    Therefore, she is discharged in good and stable condition to home with close outpatient follow-up.    The patient recovered much more quickly than anticipated on admission.    Discharge Date  6/17/2021    FOLLOW UP ITEMS POST DISCHARGE  Follow-up with the primary care physician in 7 to 10 days  Follow-up with nephrology as scheduled  Follow-up with dialysis as scheduled    DISCHARGE DIAGNOSES  Principal Problem:    Pain in the chest  POA: Unknown  Active Problems:    Controlled type 2 diabetes mellitus with chronic kidney disease on chronic dialysis, without long-term current use of insulin (HCC) POA: Yes    Mixed hyperlipidemia POA: Yes    Coronary artery disease due to lipid rich plaque (Chronic) POA: Yes      Overview: 2 Synergy NOEMI to 100% RCA stent placed    Chronic diastolic heart failure (HCC) POA: Yes    ESRD (end stage renal disease) on dialysis (HCC) POA: Yes    Acquired hypothyroidism POA: Yes  Resolved Problems:    * No resolved hospital problems. *      FOLLOW UP  Future Appointments   Date Time Provider Department Center   8/3/2021  4:00 PM ANGELITA Portillo     No follow-up provider specified.    MEDICATIONS ON DISCHARGE     Medication List      START taking these medications      Instructions   pregabalin 25 MG Caps  Commonly known as: LYRICA   Take 1 capsule by mouth 3 times a day for 30 days.  Dose: 25 mg        CONTINUE taking these medications      Instructions   atorvastatin 20 MG Tabs  Commonly known as: LIPITOR   Take 1 tablet by mouth at bedtime.  Dose: 1 tablet     Radha Low Dose 81 MG Chew chewable tablet  Generic drug: aspirin   Doctor's comments: DX Code Needed  .  TOME FERNANDO TABLETA TODOS LOS MCCORMICK NOT COVERED OTC     carvedilol 25 MG Tabs  Commonly known as: COREG   Take 1 Tab by mouth 2 times a day, with meals.  Dose: 25 mg     furosemide 80 MG Tabs  Commonly known as: LASIX   Doctor's comments: Yes, I intend to prescribe 240mg of lasix once a day. She is ESRD and needs high dose to respond.  Take 3 Tablets by mouth every day.  Dose: 240 mg     gabapentin 100 MG Caps  Commonly known as: NEURONTIN   Doctor's comments: PLEASE TRANSLATE TO [place] [employer]  Take 100 mg by mouth three times weekly after dialysis and take 100 mg by mouth nightly before bed.     hydrALAZINE 100 MG tablet  Commonly known as: APRESOLINE   Take 1 tablet by mouth 2 times a day.  Dose: 100 mg      levothyroxine 50 MCG Tabs  Commonly known as: SYNTHROID   Take 1 Tab by mouth Every morning on an empty stomach.  Dose: 50 mcg     lisinopril 40 MG tablet  Commonly known as: PRINIVIL   Take 1 tablet by mouth every day.  Dose: 40 mg     pioglitazone 30 MG Tabs  Commonly known as: ACTOS   Take 1 Tab by mouth every day.  Dose: 30 mg     sevelamer carbonate 800 MG Tabs tablet  Commonly known as: RENVELA   Take 1 tablet by mouth 3 times a day with meals.  Dose: 800 mg     Vitamin D3 2000 UNIT Caps   Take 1 Cap by mouth every day.  Dose: 1 capsule            Allergies  No Known Allergies    DIET  Orders Placed This Encounter   Procedures   • Diet Order Diet: Renal     Standing Status:   Standing     Number of Occurrences:   1     Order Specific Question:   Diet:     Answer:   Renal [8]       ACTIVITY  As tolerated.  Weight bearing as tolerated    CONSULTATIONS  None    PROCEDURES  None    LABORATORY  Lab Results   Component Value Date    SODIUM 131 (L) 06/17/2021    POTASSIUM 4.6 06/17/2021    CHLORIDE 90 (L) 06/17/2021    CO2 27 06/17/2021    GLUCOSE 90 06/17/2021    BUN 31 (H) 06/17/2021    CREATININE 5.75 (HH) 06/17/2021        Lab Results   Component Value Date    WBC 2.5 (LL) 06/17/2021    HEMOGLOBIN 11.3 (L) 06/17/2021    HEMATOCRIT 34.4 (L) 06/17/2021    PLATELETCT 119 (L) 06/17/2021        Total time of the discharge process exceeds 37 minutes.

## 2021-06-17 NOTE — CONSULTS
DATE OF SERVICE:  06/16/2021     REQUESTING PHYSICIAN:  Dr. Richmond from the emergency room service.     REASON FOR CONSULTATION:  Management of chronic kidney disease, assessing the   need for dialysis.     The patient seen and examined, medical record reviewed.     HISTORY OF PRESENT ILLNESS:  The patient is a 70-year-old lady who is well   known to our service.  She has a history of end-stage renal disease, undergoes   hemodialysis on Monday, Wednesday, Friday, patient presented to the hospital   with chest pain.  It happened at the end of dialysis.  The patient describes   the pain as tightness, radiating to her left arm.  The patient has no   shortness of breath, no headache, no change in vision.  She has been evaluated   in the emergency room and she is going to be admitted for workup of her chest   pain, rule out acute coronary syndrome.     Again, the patient just had hemodialysis treatment earlier today.     PAST MEDICAL HISTORY:  Significant for:  1.  Hypertension.  2.  End-stage renal disease.  3.  Diabetes.     ALLERGIES:  No known drug allergies.     SOCIAL HISTORY:  The patient has no smoking history.     FAMILY HISTORY:  Positive for diabetes in her brother.     MEDICATIONS:  Reviewed.     REVIEW OF SYSTEMS:  The patient is having the chest pain.  She is anxious, but   all other review of systems is negative except outlined in the history of   present illness.     PHYSICAL EXAMINATION:  GENERAL:  The patient appears anxious, but in no apparent distress.  VITAL SIGNS:  Showed blood pressure 157/66, heart rate was 65, respiratory   rate was 16.  HEENT:  Normocephalic, atraumatic.  Sclerae are anicteric.  Pupils are   reactive.  Nose normal.  Mucous membranes moist.  LUNGS:  Clear to auscultation.  HEART:  S1, S2.  ABDOMEN:  Soft, nontender.  No hepatosplenomegaly.  EXTREMITIES:  There is no lower extremity edema.  SKIN:  No skin rash.  NEUROLOGIC:  The patient is alert and oriented x3.     LABORATORY  DATA:  Her recent labs were reviewed.     DIAGNOSTIC DATA:  She had a chest x-ray, which I reviewed the image myself,   showed cardiomegaly.     ASSESSMENT:  1.  End-stage renal disease.  2.  Chest pain.  3.  Uncontrolled hypertension.  4.  Diabetes mellitus.  5.  Anemia.     PLAN:  1.  There is no acute need for dialysis.  The patient just finished her   dialysis treatment.  We will evaluate daily.  Plan for dialysis on Friday if   the patient still hospitalized.  2.  Restart anti-hypertensive medication for better blood pressure management.  3.  To workup her cardiac disease per primary team.  4.  Renal dose all medications.  5.  Renal diet.     Plan discussed in detail with Dr. Richmond.        ______________________________  FADI NAJJAR, MD FN/KHARI/CHRISTY    DD:  06/16/2021 21:50  DT:  06/16/2021 22:21    Job#:  [id number]

## 2021-06-17 NOTE — PROGRESS NOTES
Pt reporting increasing pain 7/10 in her legs and restless leg symptoms and is requesting something for pain so she can sleep. Physician notified, new orders received and carried out.

## 2021-06-17 NOTE — CARE PLAN
The patient is Stable - Low risk of patient condition declining or worsening    Shift Goals  Clinical Goals: Manage chest pain  Patient Goals: Rest    Progress made toward(s) clinical / shift goals: No reports of CP tonight. Obtained order for medications for sleep, restless legs, and anxiety. Pt observed sleeping on several occasions.       Problem: Pain - Standard  Goal: Alleviation of pain or a reduction in pain to the patient’s comfort goal  Outcome: Progressing     Problem: Knowledge Deficit - Standard  Goal: Patient and family/care givers will demonstrate understanding of plan of care, disease process/condition, diagnostic tests and medications  Outcome: Progressing       Patient is not progressing towards the following goals: n/a

## 2021-06-17 NOTE — DISCHARGE INSTRUCTIONS
Discharge Instructions    Discharged to home by car with relative. Discharged via wheelchair, hospital escort: Yes.  Special equipment needed: Not Applicable    Be sure to schedule a follow-up appointment with your primary care doctor or any specialists as instructed.     Discharge Plan:   Diet Plan: Discussed  Activity Level: Discussed  Confirmed Follow up Appointment: Patient to Call and Schedule Appointment  Confirmed Symptoms Management: Discussed  Medication Reconciliation Updated: Yes    I understand that a diet low in cholesterol, fat, and sodium is recommended for good health. Unless I have been given specific instructions below for another diet, I accept this instruction as my diet prescription.   Other diet: As Tolerated    Special Instructions: None    · Is patient discharged on Warfarin / Coumadin?   No     Depression / Suicide Risk    As [facility], it is important to learn how to keep safe from harming yourself.    Recognize the warning signs:  · Abrupt changes in personality, positive or negative- including increase in energy   · Giving away possessions  · Change in eating patterns- significant weight changes-  positive or negative  · Change in sleeping patterns- unable to sleep or sleeping all the time   · Unwillingness or inability to communicate  · Depression  · Unusual sadness, discouragement and loneliness  · Talk of wanting to die  · Neglect of personal appearance   · Rebelliousness- reckless behavior  · Withdrawal from people/activities they love  · Confusion- inability to concentrate     If you or a loved one observes any of these behaviors or has concerns about self-harm, here's what you can do:  · Talk about it- your feelings and reasons for harming yourself  · Remove any means that you might use to hurt yourself (examples: pills, rope, extension cords, firearm)  · Get professional help from the community (Mental Health, Substance Abuse, psychological  counseling)  · Do not be alone:Call your Safe Contact- someone whom you trust who will be there for you.  · Call your local CRISIS HOTLINE 890-2737 or 885-040-9947  · Call your local Children's Mobile Crisis Response Team [place] (328) 208-6274 or www.Zephyr  · Call the toll free National Suicide Prevention Hotlines   · National Suicide Prevention Lifeline 997-609-JJXS (6493)  · National [employer] Line Network 800-SUICIDE (109-2330)        Síndrome de las piernas inquietas  Restless Legs Syndrome  El síndrome de las piernas inquietas es bernardo afección que causa incomodidad o molestias en las piernas, especialmente al estar sentado o acostado. Por lo general, estas sensaciones provocan la necesidad urgente de  las piernas. A veces, también puede afectar los brazos.  La afección puede ser de leve a grave. Los síntomas a menudo interfieren en la capacidad de dormir de bernardo persona.  ¿Cuáles son las causas?  Se desconoce la causa de esta afección.  ¿Qué incrementa el riesgo?  Los siguientes factores pueden hacer que usted sea más propenso a tener esta afección:  · Ser mayor de 50 años.  · Embarazo.  · Ser cora. En general, esta afección es más común en las mujeres que en los hombres.  · Antecedentes familiares de la enfermedad.  · Tener déficit de vanessa.  · Consumir cafeína, nicotina o alcohol de forma excesiva.  · Determinadas enfermedades, noemi enfermedad renal, enfermedad de Parkinson o daño nervioso.  · Determinados medicamentos, noemi aquellos para la presión arterial meliza, las náuseas, los resfríos, las alergias, la depresión y algunos trastornos cardíacos.  ¿Cuáles son los signos o los síntomas?  El síntoma principal de kiya trastorno son sensaciones molestas en las piernas, tales noemi:  · Tironeo.  · Hormigueo.  · Pinchazos.  · Punzadas.  · Algo que camina por la pierna.  · Quemazón.  Por lo general, las sensaciones:  · Afectan ambos lados del cuerpo.  · Son más intensas cuando está sentado o  acostado.  · Empeoran por la noche. Estas pueden despertarlo o hacer que sea difícil conciliar el sueño.  · Hacen que tenga bernardo necesidad imperiosa de  las piernas.  · Se alivian de forma temporal al  las piernas.  Pueden afectar también los brazos, jaki esto es poco frecuente. Las personas con kiya trastorno suelen estar cansadas irma el día debido a la falta de sueño por la noche.  ¿Cómo se diagnostica?  Esta afección se puede diagnosticar en función de lo siguiente:  · Eli síntomas.  · Análisis de andrae.  En algunos casos, un especialista puede controlarlo en un laboratorio del sueño (un estudio del sueño). [place] puede detectar cualquier alteración en el sueño.  ¿Cómo se trata?  Esta afección se trata controlando los síntomas. [place] puede incluir lo siguiente:  · Cambios en el estilo de augie, noemi hacer actividad física, usar técnicas de relajación y evitar la cafeína, el alcohol o el tabaco.  · Medicamentos. Los anticonvulsivos pueden probarse bairon.  Siga estas indicaciones en donahue casa:         Instrucciones generales  · [place] los medicamentos de venta candelaria y los recetados solamente noemi se lo haya indicado el médico.  · Use métodos para ayudar a aliviar las sensaciones molestas, por ejemplo:  ? Masajes en las piernas.  ? Caminar o hacer ejercicios de estiramiento.  ? Romi un baño de inmersión frío o caliente.  · Concurra a todas las visitas de control noemi se lo haya indicado el médico. [place] es importante.  Estilo de augie  · Adopte buenos hábitos de sueño. Por ejemplo, acuéstese y levántese a la misma hora todos los días. La mayoría de los adultos debería dormir entre 7 y 9 horas todas las noches.  · Shilpa ejercicio regularmente. Intente realizar al menos 30 minutos de ejercicio la mayoría de los días de la semana.  · Practique actividades que lo relajen, noemi yoga o meditación.  · Evite la cafeína y el alcohol.  · No consuma ningún producto que contenga nicotina o tabaco, noemi cigarrillos y  cigarrillos electrónicos. Si necesita ayuda para dejar de fumar, consulte al médico.  Comuníquese con un médico si:  · Los síntomas empeoran o no mejoran con el tratamiento.  Resumen  · El síndrome de las piernas inquietas es bernardo afección que causa incomodidad o molestias en las piernas, especialmente al estar sentado o acostado.  · Los síntomas a menudo interfieren en la capacidad de dormir de bernardo persona.  · Esta afección se trata controlando los síntomas. Fabrice vez deba hacer cambios en donahue estilo de augie o roscoe medicamentos.  Esta información no tiene noemi fin reemplazar el consejo del médico. Asegúrese de hacerle al médico cualquier pregunta que tenga.  Document Released: 09/27/2006 Document Revised: 02/27/2019 Document Reviewed: 02/27/2019  [employer] Patient Education © 2020 [employer]    Pregabalin capsules  ¿Qué es kiya medicamento?  La PREGABALINA se utiliza para tratar el dolor de los nervios provocado por diabetes, culebrilla, daño a la médula nolan y fibromialgia. Se utiliza también para controlar las convulsiones de la epilepsia.  Kiya medicamento puede ser utilizado para otros usos; si tiene alguna pregunta consulte con donahue proveedor de atención médica o con donahue farmacéutico.  MARCAS COMUNES: He[place]ra  ¿Qué le huang informar a mi profesional de la lazaro antes de roscoe kiya medicamento?  Necesitan saber si usted presenta alguno de los siguientes problemas o situaciones:  enfermedad cardiaca antecedentes de abuso de alcohol o drogas enfermedad renal enfermedad pulmonar o respiratoria ideas, planes o intento de suicidio; si usted o alguien de donahue bertha ha intentado suicidarse previamente bernardo reacción alérgica o inusual a la pregabalina, a la gabapentina, a otros medicamentos, alimentos, colorantes o conservantes si está embarazada o buscando quedar embarazada si está amamantando a un bebé  ¿Cómo huang utilizar kiya medicamento?  [place] kiya medicamento por vía oral con un vaso de agua. Siga las instrucciones  de la etiqueta del medicamento. Puede tomarlo con o sin alimentos. Si el medicamento le produce malestar estomacal, tómelo con alimentos. Use donahue medicamento a intervalos regulares. No lo use con bernardo frecuencia mayor a la indicada. No deje de usarlo, excepto si así lo indica donahue médico.  Donahue farmacéutico le dará bernardo Guía del medicamento especial (MedGuide, nombre en inglés) con cada receta y en cada ocasión que la vuelva a surtir. Asegúrese de leer esta información cada vez cuidadosamente.  Hable con donahue pediatra para informarse acerca del uso de kiya medicamento en niños. Aunque kiya medicamento se puede recetar a niños tan pequeños noemi de 1 mes de edad con ciertas afecciones, existen precauciones que deben tomarse.  Sobredosis: Póngase en contacto inmediatamente con un centro toxicológico o bernardo yoselin de urgencia si usted edelmira que haya tomado demasiado medicamento.  ATENCIÓN: Kiya medicamento es solo para usted. No comparta kiya medicamento con nadie.  ¿Qué sucede si me olvido de bernardo dosis?  Si olvida bernardo dosis, tómela lo antes posible. Si es james la hora de la próxima dosis, tome sólo cricket dosis. No tome dosis adicionales o dobles.  ¿Qué puede interactuar con kiya medicamento?  Kiya medicamento podría interactuar con los siguientes fármacos:  alcohol antihistamínicos para alergia, tos y resfrío ciertos medicamentos para la ansiedad o para dormir ciertos medicamentos para la depresión, tales noemi amitriptilina, fluoxetina, sertralina ciertos medicamentos para la diabetes ciertos medicamentos para convulsiones, tales noemi fenobarbital, primidona anestésicos generales, tales noemi halotano, isoflurano, metoxiflurano, propofol anestésicos locales, tales noemi lidocaína, pramoxina, tetracaína medicamentos para relajar los músculos antes de bernardo cirugía medicamentos narcóticos para el dolor fenotiazinas, tales noemi clorpromazina, mesoridazina, proclorperazina, tioridazina  Puede ser que esta lista no menciona todas las posibles  interacciones. Informe a donahue profesional de la lazaro de todos los productos a base de hierbas, medicamentos de venta candelaria o suplementos nutritivos que esté tomando. Si usted fuma, consume bebidas alcohólicas o si utiliza drogas ilegales, indíqueselo también a donahue profesional de la lazaro. Algunas sustancias pueden interactuar con donahue medicamento.  ¿A qué huang estar atento al usar kiya medicamento?  Informe a donahue médico o donahue profesional de la lazaro si bhaskar síntomas no comienzan a mejorar o si empeoran. Visite a donahue médico o a donahue profesional de la lazaro para chequear donahue evolución periódicamente. No deje de roscoe kiya medicamento excepto si así lo indica donahue médico. Usted puede desarrollar bernardo reacción grave. Donahue médico le indicará la cantidad de medicamento que necesitará roscoe.  Use bernardo pulsera o martines de identificación médica. Lleve consigo bernardo tarjeta de identificación con información sobre donahue enfermedad y detalles de bhaskar medicamentos y los horarios de las dosis.  Puede experimentar mareos o somnolencia. No conduzca ni utilice maquinaria ni miranda nada que le exija permanecer en estado de alerta hasta que sepa cómo le afecta kiya medicamento. No se siente ni se ponga de pie con rapidez, especialmente si es un paciente de edad avanzada. [place] reduce el riesgo de mareos o desmayos. El alcohol puede interferir con el efecto de kiya medicamento. Evite consumir bebidas alcohólicas.  Si tiene bernardo afección cardiaca, noemi insuficiencia cardíaca congestiva, y nota que está reteniendo agua y tiene hinchazón de las mis o pies, consulte a donahue proveedor de atención médica inmediatamente.  El uso de kiya medicamento puede aumentar la posibilidad de ideas o comportamiento suicida. Presta atención a noemi usted responde mientras esté usando kiya medicamento. Cualquier empeoramiento de humor o ideas de suicidio o de morir que muestra, debe informar a donahue profesional de la lazaro inmediatamente.  Kiya medicamento ha causado bernardo reducción de  esperma en algunos hombres. [place] puede interferir con la capacidad de tener un ellis. Usted debe hablar con donahue médico donahue profesional de la lazaro si está preocupado sobre donahue fertilidad.  Las mujeres que se encuentran embarazadas mientras usan kiya medicamento para convulsiones pueden inscribirse en el registro del [place] American Antiepileptic Drug Pregnancy Registry (Registro estadounidense de Embarazo de Medicamentos Antiepilépticos) llamando al teléfono [phone]. Kiya registro recoge información acerca de la seguridad del uso de medicamentos antiepilépticos irma el embarazo.  ¿Qué efectos secundarios puedo tener al utilizar kiya medicamento?  Efectos secundarios que debe informar a donahue médico o a donahue profesional de la lazaro tan pronto noemi sea posible: reacciones alérgicas, noemi erupción cutánea, picazón o urticarias, e hinchazón de la abdiel, los labios o la lengua problemas respiratorios cambios en la visión dolor en el pecho confusión sacudidas o movimientos inusuales de cualquier parte del cuerpo pérdida de memoria dolor, sensibilidad o debilidad muscular ideas suicidas u otros cambios en el estado de ánimo hinchazón de tobillos, pies, mis sangrado o moretones inusuales  Efectos secundarios que generalmente no requieren atención médica (infórmelos a donahue médico o a donahue profesional de la lazaro si persisten o si son molestos): mareos somnolencia boca seca dolor de sergey náuseas temblores dificultad para conciliar el sueño aumento de peso  Puede ser que esta lista no menciona todos los posibles efectos secundarios. Comuníquese a donahue médico por asesoramiento médico sobre los efectos secundarios. Usted puede informar los efectos secundarios a la FDA por teléfono al [phone].  ¿Dónde huang guardar mi medicina?  Mantenga fuera del alcance de los niños. Existe la posibilidad de abusar de kiya medicamento. Mantenga donahue medicamento en un lugar seguro para protegerlo contra robos. No comparta kiya medicamento con  nadie. Es peligroso  o regalar [place] medicamento, y está prohibido por la rossy.  [place] medicamento puede causar bernardo sobredosis accidental y la muerte si lo shraddha otros adultos, niños o mascotas. Mezcle todo el medicamento sin usar con bernardo sustancia noemi piedras sanitarias para gatos o posos (residuos) de café. Luego deseche el medicamento en un recipiente cerrado, noemi bernardo bolsa sellada o bernardo quang de café con tapa. No utilice [place] medicamento después de la fecha de vencimiento.  Guarde a temperatura ambiente, entre 15 y 30 grados Celsius (59 y 86 grados Fahrenheit).  ATENCIÓN: [place] folleto es un resumen. Puede ser que no cubra toda la posible información. Si usted tiene preguntas acerca de esta medicina, consulte con donahue médico, donahue farmacéutico o donahue profesional de la lazaro.  © 2020 Elsevier/Gold Standard (2020-02-18 00:00:00)

## 2021-06-17 NOTE — PROGRESS NOTES
Bedside report received from TRAVIS Dan. Assumed pt care. LUIS Edgar and certified  utilized for [place] language interpretation during assessment. Pt is AOx4. Denies any pain or other distress at this time. Discussed POC including alerting staff to any CP, DVT prophylaxis including SCDs. Pt verbalized understanding. Hourly rounding in place. Fall precautions in place and call light within reach.

## 2021-06-17 NOTE — PROGRESS NOTES
4 Eyes Skin Assessment Completed by TRAVIS Trinidad and TRAVIS Meier.    Head WDL  Ears WDL  Nose WDL  Mouth WDL  Neck WDL  Breast/Chest WDL  Shoulder Blades WDL  Spine WDL  (R) Arm/Elbow/Hand WDL  (L) Arm/Elbow/Hand WDL  Abdomen WDL  Groin WDL  Scrotum/Coccyx/Buttocks WDL  (R) Leg WDL  (L) Leg WDL  (R) Heel/Foot/Toe WDL  (L) Heel/Foot/Toe WDL      Devices In Places Tele Box, PIV x 1      Interventions In Place Pillows    Possible Skin Injury No    Pictures Uploaded Into Epic N/A  Wound Consult Placed N/A  RN Wound Prevention Protocol Ordered No

## 2021-06-17 NOTE — PROGRESS NOTES
Pt pacing in room and hallway, reports unrelieved restless leg pain and inability to sleep. Physician notified, new orders received, medicated per MAR.

## 2021-06-17 NOTE — PROGRESS NOTES
Telemetry Shift Summary    Rhythm: SR/SB  HR: 50-60  Ectopy: R-PAC    Measurements for strip printed 6/17/2021 at 0824  HR 59  0.16 / 0.08 / 0.46    Normal Values  Rhythm: SR  HR:   Measurements: 0.12-0.20 / 0.06-0.10 / 0.30-0.52

## 2021-06-17 NOTE — PROGRESS NOTES
Tele strip printed at 0231     Rhythm: SB   HR: 57  Measurements: 0.16/0.10/0.48        Telemetry Shift Summary     Rhythm: SR/SB  HR Range: 48-70's  Ectopy: Rare PAC     Telemetry monitoring strips placed in pt's chart.

## 2021-06-17 NOTE — PROGRESS NOTES
Orders for pt discharge received. Pt d/c to home with son. Discharge instructions given to pt, IV x 1 removed, tele removed. Pt verbalized understanding of d/c instructions, all questions answered. Pt off unit via wheelchair with hospital staff.

## 2021-06-23 ENCOUNTER — TELEPHONE (OUTPATIENT)
Dept: CARDIOLOGY | Facility: MEDICAL CENTER | Age: 71
End: 2021-06-23

## 2021-06-23 NOTE — TELEPHONE ENCOUNTER
BRAYAN Cerrato called from the [facility] to see if we can fax over the Cardiology work up needed for the transplant to the transplant center. It can be faxed at [phone]. If you have any questions or concerns please call them back at [phone]    Thank you,  Michelle KAY

## 2021-06-28 RX ORDER — ROPINIROLE 1 MG/1
TABLET, FILM COATED ORAL
Qty: 180 TABLET | Refills: 1 | Status: SHIPPED | OUTPATIENT
Start: 2021-06-28 | End: 2021-12-29

## 2021-06-30 DIAGNOSIS — I25.83 CORONARY ARTERY DISEASE DUE TO LIPID RICH PLAQUE: ICD-10-CM

## 2021-06-30 DIAGNOSIS — I25.10 CORONARY ARTERY DISEASE DUE TO LIPID RICH PLAQUE: ICD-10-CM

## 2021-06-30 RX ORDER — CARVEDILOL 25 MG/1
TABLET ORAL
Qty: 180 TABLET | Refills: 3 | Status: SHIPPED | OUTPATIENT
Start: 2021-06-30 | End: 2022-08-24

## 2021-07-06 DIAGNOSIS — Z95.5 S/P CORONARY ARTERY STENT PLACEMENT: ICD-10-CM

## 2021-07-06 RX ORDER — ASPIRIN 81 MG
TABLET,CHEWABLE ORAL
Qty: 90 TABLET | Refills: 3 | Status: ON HOLD | OUTPATIENT
Start: 2021-07-06 | End: 2021-08-12

## 2021-07-08 ENCOUNTER — OFFICE VISIT (OUTPATIENT)
Dept: MEDICAL GROUP | Facility: MEDICAL CENTER | Age: 71
End: 2021-07-08
Payer: MEDICARE

## 2021-07-08 VITALS
TEMPERATURE: 97.3 F | HEIGHT: 60 IN | OXYGEN SATURATION: 97 % | BODY MASS INDEX: 27.48 KG/M2 | HEART RATE: 57 BPM | WEIGHT: 140 LBS | DIASTOLIC BLOOD PRESSURE: 68 MMHG | SYSTOLIC BLOOD PRESSURE: 120 MMHG

## 2021-07-08 DIAGNOSIS — G89.29 CHRONIC PAIN OF RIGHT KNEE: ICD-10-CM

## 2021-07-08 DIAGNOSIS — Z99.2 ESRD (END STAGE RENAL DISEASE) ON DIALYSIS (HCC): ICD-10-CM

## 2021-07-08 DIAGNOSIS — G25.81 RLS (RESTLESS LEGS SYNDROME): ICD-10-CM

## 2021-07-08 DIAGNOSIS — N18.6 ESRD (END STAGE RENAL DISEASE) ON DIALYSIS (HCC): ICD-10-CM

## 2021-07-08 DIAGNOSIS — M25.561 CHRONIC PAIN OF RIGHT KNEE: ICD-10-CM

## 2021-07-08 PROCEDURE — 99214 OFFICE O/P EST MOD 30 MIN: CPT | Performed by: PHYSICIAN ASSISTANT

## 2021-07-08 ASSESSMENT — FIBROSIS 4 INDEX: FIB4 SCORE: 2.58

## 2021-07-08 NOTE — PROGRESS NOTES
Subjective:   Tere Gallegos is a 70 y.o. female here today for knee pain    Chronic pain of right knee  Patient reports having chronic intermittent right knee pain.  She is try different medications for this.  She feels like it aches and is stiff after being on it all day.    RLS (restless legs syndrome)  Chronic.  Previously well controlled with ropinirole 1 mg before dialysis and 1 mg before bed.  The 1 mg dose is still doing well prior to dialysis, however at night she is having more restless leg symptoms which is keeping her up at night.  She has tried Lyrica 25 mg times daily as well as gabapentin 100 mg states these do not work for her and reports burning sensation and restlessness down both legs worse in the evening         Current medicines (including changes today)  Current Outpatient Medications   Medication Sig Dispense Refill   • ASPIRIN LOW DOSE 81 MG Chew Tab chewable tablet TOME FERNANDO TABLETA TODOS LOS MCCORMICK NOT COVERED OTC 90 tablet 3   • carvedilol (COREG) 25 MG Tab TOME FERNANDO TABLETA DOS VECES AL DESMOND CON LAS COMIDAS 180 tablet 3   • ROPINIRole (REQUIP) 1 MG Tab TOME FERNANDO TABLETA DOS VECES AL DESMOND 180 tablet 1   • sevelamer carbonate (RENVELA) 800 MG Tab tablet Take 1 tablet by mouth 3 times a day with meals. 270 tablet 3   • pregabalin (LYRICA) 25 MG Cap Take 1 capsule by mouth 3 times a day for 30 days. 90 capsule 3   • furosemide (LASIX) 80 MG Tab Take 3 Tablets by mouth every day. 90 tablet 3   • lisinopril (PRINIVIL) 40 MG tablet Take 1 tablet by mouth every day. 90 tablet 3   • hydrALAZINE (APRESOLINE) 100 MG tablet Take 1 tablet by mouth 2 times a day. 180 tablet 3   • atorvastatin (LIPITOR) 20 MG Tab Take 1 tablet by mouth at bedtime.     • gabapentin (NEURONTIN) 100 MG Cap Take 100 mg by mouth three times weekly after dialysis and take 100 mg by mouth nightly before bed. (Patient not taking: Reported on 6/16/2021) 120 Cap 1   • pioglitazone (ACTOS) 30 MG Tab Take 1 Tab by mouth every  day. 90 Tab 3   • levothyroxine (SYNTHROID) 50 MCG Tab Take 1 Tab by mouth Every morning on an empty stomach. 90 Tab 3   • Cholecalciferol (VITAMIN D3) 2000 UNIT Cap Take 1 Cap by mouth every day. 30 Cap 3     No current facility-administered medications for this visit.     She  has a past medical history of Acquired hypothyroidism (5/4/2020), CAD (coronary artery disease), Chronic diastolic heart failure (HCC) (5/4/2020), Coronary artery disease due to lipid rich plaque, Dental disorder, Diabetes ([facility]), ESRD (end stage renal disease) on dialysis ([facility]) (5/4/2020), Hemodialysis patient ([facility]), Hyperlipidemia, Hypertension, Kidney transplant candidate, Presence of drug-eluting stent in right coronary artery, QT prolongation (1/22/2020), RLS (restless legs syndrome) (8/5/2016), and Transaminitis (12/22/2018).  Patient has no known allergies.     Social History and Family History were reviewed and updated.    ROS   No headaches, chest pain, no shortness of breath, abdominal pain, nausea, or vomiting.  All other systems were reviewed and are negative or noted as positive in the HPI.       Objective:     /68 (BP Location: Right arm, Patient Position: Sitting)   Pulse (!) 57   Temp 36.3 °C (97.3 °F) (Temporal)   Ht 1.524 m (5')   Wt 63.5 kg (140 lb)   SpO2 97%  Body mass index is 27.34 kg/m².     Physical Exam:  Constitutional: ANO x3, no acute distress, well-nourished, well-hydrated   Skin: Warm, dry, good turgor, no rashes in visible areas.  HEENT: Is normocephalic atraumatic, good PERRLA, extraocular movements intact, TMs and oropharynx are clear with good dentition   Neck: Soft and supple, trachea midline, no masses.  No thyroid megaly or cervical lymphadenopathy noted  Cardiovascular: Regular rate and rhythm.  Normal S1 and 2, no murmurs, rubs or gallops.  No edema noted  Lungs: Clear to auscultation bilaterally.  No wheezes, rales or rhonchi.  Good inspiratory and expiratory breath sounds  Abdomen: Soft,  non-tender, no masses.  No hepatosplenomegaly.  Negative Eid's  Psych: Alert and oriented x3, normal affect and mood.  Neuro: Cranial nerves II through XII were assessed and intact.  Normal gait, normal cerebellar function noted  Musculoskeletal: Full range of motion, good strength against resistance    Clinical Course/Lab Analysis:        Assessment and Plan:   The following treatment plan was discussed.  Signs and symptoms for which to return were discussed with patient at length.  Patient verbalized understanding.    1. Chronic pain of right knee  Chronic and unstable  This may be related to her restless leg syndrome but differential also includes osteoarthritis.  We will get an x-ray and review  - DX-KNEE 3 VIEWS RIGHT; Future    2. RLS (restless legs syndrome)  Chronic and unstable  Patient has been prescribed both Lyrica and gabapentin and all adjusted for her end-stage renal disease.  She reports that she does not get a benefit from these medications.  Did consider duloxetine but it is not advisable for ESRD therefore will send to pain management  - REFERRAL TO OUTPATIENT INTERVENTIONAL PAIN CLINIC    3. ESRD (end stage renal disease) on dialysis (HCC)  Chronic: Last GFR was less than 8  Continue dialysis.  Continue furosemide as prescribed by nephrology high-dose 80 mg 3 times daily         Of note,  was used throughout the whole interaction.  His name was Babatunde.    Followup: Primary care as directed    Please note that this dictation was created using voice recognition software. I have made every reasonable attempt to correct obvious errors, but I expect that there are errors of grammar and possibly content that I did not discover before finalizing the note.

## 2021-07-08 NOTE — ASSESSMENT & PLAN NOTE
Chronic.  Previously well controlled with ropinirole 1 mg before dialysis and 1 mg before bed.  The 1 mg dose is still doing well prior to dialysis, however at night she is having more restless leg symptoms which is keeping her up at night.  She has tried Lyrica 25 mg times daily as well as gabapentin 100 mg states these do not work for her and reports burning sensation and restlessness down both legs worse in the evening

## 2021-07-08 NOTE — ASSESSMENT & PLAN NOTE
Patient reports having chronic intermittent right knee pain.  She is try different medications for this.  She feels like it aches and is stiff after being on it all day.

## 2021-08-02 DIAGNOSIS — N18.6 ESRD (END STAGE RENAL DISEASE) ON DIALYSIS (HCC): ICD-10-CM

## 2021-08-02 DIAGNOSIS — Z99.2 ESRD (END STAGE RENAL DISEASE) ON DIALYSIS (HCC): ICD-10-CM

## 2021-08-02 DIAGNOSIS — D72.819 LEUKOPENIA, UNSPECIFIED TYPE: ICD-10-CM

## 2021-08-02 NOTE — PROGRESS NOTES
Leukopenia. ESRD on Hemodialysis, listed for transplant but on hold due to leukopenia. [facility] is requesting hem/onc clearance prior to listing in full for transplant.     Priyank Villar MD  Nephrology

## 2021-08-03 ENCOUNTER — OFFICE VISIT (OUTPATIENT)
Dept: ENDOCRINOLOGY | Facility: MEDICAL CENTER | Age: 71
End: 2021-08-03
Attending: NURSE PRACTITIONER
Payer: MEDICARE

## 2021-08-03 VITALS
BODY MASS INDEX: 27.96 KG/M2 | WEIGHT: 142.4 LBS | OXYGEN SATURATION: 96 % | HEIGHT: 60 IN | DIASTOLIC BLOOD PRESSURE: 58 MMHG | SYSTOLIC BLOOD PRESSURE: 114 MMHG | HEART RATE: 64 BPM

## 2021-08-03 DIAGNOSIS — E78.2 MIXED HYPERLIPIDEMIA: ICD-10-CM

## 2021-08-03 DIAGNOSIS — Z99.2 CONTROLLED TYPE 2 DIABETES MELLITUS WITH CHRONIC KIDNEY DISEASE ON CHRONIC DIALYSIS, WITHOUT LONG-TERM CURRENT USE OF INSULIN (HCC): ICD-10-CM

## 2021-08-03 DIAGNOSIS — E11.22 CONTROLLED TYPE 2 DIABETES MELLITUS WITH CHRONIC KIDNEY DISEASE ON CHRONIC DIALYSIS, WITHOUT LONG-TERM CURRENT USE OF INSULIN (HCC): ICD-10-CM

## 2021-08-03 DIAGNOSIS — G25.81 RLS (RESTLESS LEGS SYNDROME): ICD-10-CM

## 2021-08-03 DIAGNOSIS — N18.6 CONTROLLED TYPE 2 DIABETES MELLITUS WITH CHRONIC KIDNEY DISEASE ON CHRONIC DIALYSIS, WITHOUT LONG-TERM CURRENT USE OF INSULIN (HCC): ICD-10-CM

## 2021-08-03 LAB
HBA1C MFR BLD: 5.7 % (ref 0–5.6)
INT CON NEG: ABNORMAL
INT CON POS: ABNORMAL

## 2021-08-03 PROCEDURE — 99212 OFFICE O/P EST SF 10 MIN: CPT | Performed by: NURSE PRACTITIONER

## 2021-08-03 PROCEDURE — 99215 OFFICE O/P EST HI 40 MIN: CPT | Performed by: NURSE PRACTITIONER

## 2021-08-03 PROCEDURE — 83036 HEMOGLOBIN GLYCOSYLATED A1C: CPT | Performed by: NURSE PRACTITIONER

## 2021-08-03 RX ORDER — GABAPENTIN 300 MG/1
300 CAPSULE ORAL 2 TIMES DAILY
Qty: 60 CAPSULE | Refills: 11 | Status: SHIPPED | OUTPATIENT
Start: 2021-08-03 | End: 2021-08-11

## 2021-08-03 RX ORDER — IBUPROFEN 800 MG/1
800 TABLET ORAL EVERY 8 HOURS PRN
Qty: 30 TABLET | Refills: 11 | Status: ON HOLD | OUTPATIENT
Start: 2021-08-03 | End: 2021-08-12

## 2021-08-03 ASSESSMENT — FIBROSIS 4 INDEX: FIB4 SCORE: 2.58

## 2021-08-03 NOTE — PROGRESS NOTES
"RN-CDE Note    Subjective:   Endocrinology Clinic Progress Note  PCP: ANGELITA Concepcion    HPI:  Tere Gallegos is a 70 y.o. old patient who is seen today for review of her controlled type 2 diabetes with ESRD.      DM:   Last A1c:   Lab Results   Component Value Date/Time    HBA1C 5.7 (A) 08/03/2021 04:31 PM      Previous A1c was 5.5 on 4/28/2021  A1C GOAL: < 7    Diabetes Medications:   Pioglitazone 30 mg daily    Taking daily ASA: Yes    Exercise: no regular exercise, sedentary  Diet: \"healthy\" diet  in general  Renal diet  Patient's body mass index is 27.81 kg/m². Exercise and nutrition counseling were performed at this visit.    Glucose monitoring  On occasion    Last Retinal Exam: on file and up-to-date    Lab Results   Component Value Date/Time    MALBCRT 6207 (H) 09/29/2020 10:55 AM    MICROALBUR 104.4 09/29/2020 10:55 AM      ACR Albumin/Creatinine Ratio goal <30   Currently Rx ACE/ARB: Yes   Dyslipidemia:  Lab Results   Component Value Date/Time    CHOLSTRLTOT 125 09/29/2020 10:56 AM    LDL 54 09/29/2020 10:56 AM    HDL 48 09/29/2020 10:56 AM    TRIGLYCERIDE 113 09/29/2020 10:56 AM       Currently Rx Statin: Yes     She  reports that she has never smoked. She has never used smokeless tobacco.        "

## 2021-08-03 NOTE — PROGRESS NOTES
CHIEF COMPLAINT: Patient is here for follow up of Type 2 Diabetes Mellitus.    HPI:     Tere Gallegos is a 70 y.o. female with for continued evaluation & treatment of the following:     Patient is Pakistani-speaking only and an  has been provided for this appointment.  Her daughter drove her to this appointment but she is Pakistani-speaking only as well and unable to assist.    1.  Type 2 Diabetes Mellitus   Current diabetes regimen:  Actos 30 mg daily.    Dr. Ly prescribed Trulicity several months ago but patient discontinued this as she prefers PO agents only. Patient reports good compliance. History of chronic diastolic congestive heart failure with a preserved EF of 60% she has not experienced any acute exacerbation.     Point-of-care A1c 08/03/2021: 5.7%  Serum A1c 04/28/2021: 5.5%    These values are inaccurate secondary to hemodialysis 3 times per week.    Blood sugar logs 08/03/2021: Patient does not monitor home glucose.    Patient denies hypoglycemic events.  Patient states she is hypoglycemic aware.  Patient states dialysis does fatigue her but she has not experienced hypoglycemia around HD.    Weight is increased 8 pounds since last appointment.    Diabetes Complications   Retinopathy: Known mild proliferative diabetic retinopathy.  Last eye exam: 06/22/2021 at HD retina  Neuropathy: Positive for paresthesia and numbness in bilateral lower extremities.  Currently taking Requip and has prescription for gabapentin. Patient states the current dosage is not covering with body aches and pains well. Denies any foot wounds.  Exercise: Minimal.  Diet: Fair.    Currently taking lisinopril 40 mg daily.  Current BP is well controlled at 114/58.  History of end-stage renal disease currently on hemodialysis 3 times a week.  Patient is currently on kidney transplant list through [facility].      2.  Subclinical hypothyroidism   Currently taking levothyroxine 50 mcg daily which has been her  medication for over 12 months.  Patient reports good compliance. Patient denies taking iron, calcium and/or an acid supplements.  Patient denies cold intolerance, mental fogginess and/or constipation.  Patient also denies palpitations, tremors and/or heat intolerance.  No current thyroid panel is available.     Ref. Range 4/28/2021 09:37   TSH Latest Ref Range: 0.380 - 5.330 uIU/mL 2.140   Free T-4 Latest Ref Range: 0.93 - 1.70 ng/dL 1.90 (H)       3.  Hyperlipidemia   No current statin therapy secondary to myalgias related to atorvastatin.  No current lipid panel.     Ref. Range 9/29/2020 10:56   Cholesterol,Tot Latest Ref Range: 100 - 199 mg/dL 125   Triglycerides Latest Ref Range: 0 - 149 mg/dL 113   HDL Latest Ref Range: >=40 mg/dL 48   LDL Latest Ref Range: <100 mg/dL 54     4.  Vitamin D deficiency  Not currently taking any vitamin D supplementation.     Ref. Range 11/19/2020 11:44   25-Hydroxy   Vitamin D 25 Latest Ref Range: 30 - 100 ng/mL 37       Patient's medications, allergies, and social histories were reviewed and updated as appropriate.    ROS:     CONS:     No fever, no chills   EYES:     No diplopia, no blurry vision   CV:           No chest pain, no palpitations   PULM:     No SOB, no cough, no hemoptysis.   GI:            No nausea, no vomiting, no diarrhea, no constipation   ENDO:     No polyuria, no polydipsia, no heat intolerance, no cold intolerance       Past Medical History:  Problem List:  2021-07: Chronic pain of right knee  2021-06: Pain in the chest  2021-01: Right leg pain  2020-11: Acute hypoxemic respiratory failure due to COVID-19 ([facility])  2020-11: Pneumonia due to COVID-19 virus  2020-11: Pancytopenia ([facility])  2020-09: Numbness and tingling in both hands  2020-09: Anuria  2020-09: Anxiety  2020-07: Subclinical hypothyroidism  2020-07: Long term (current) use of oral hypoglycemic drugs  2020-05: Chronic diastolic heart failure ([facility])  2020-05: ESRD (end stage renal disease) on dialysis  ([facility])  2020-05: Acquired hypothyroidism  2020-05: Dental disorder  2020-01: QT prolongation  2019-11: Mixed hyperlipidemia  2018-12: Elevated troponin  2016-09: Encounter for cervical Pap smear with pelvic exam  2016-09: Encounter to establish care with new doctor  2016-08: RLS (restless legs syndrome)  2016-08: Controlled type 2 diabetes mellitus with chronic kidney   disease on chronic dialysis, without long-term current use of insulin   ([facility])  2014-01: Hyperkalemia  2013-09: Essential hypertension  Kidney transplant candidate  Coronary artery disease due to lipid rich plaque  Presence of drug-eluting stent in right coronary artery      Past Surgical History:  Past Surgical History:   Procedure Laterality Date   • ZZZ CARDIAC CATH  9/7/2016    RCA stented with 2 Synergy drug-eluting stents.   • RECOVERY  8/16/2016    Procedure: CATH LAB [facility] WITH POSSIBLE DR. CASTILLO;  Surgeon: Recoveryonly Surgery;  Location: SURGERY PRE-POST PROC UNIT [facility];  Service:    • ZZZ CARDIAC CATH  8/16/16    100% RCA   • OTHER Left 2014    left arm upper extremity fistula   • OTHER ABDOMINAL SURGERY      left kidney removed due to cancer        Allergies:  Patient has no known allergies.     Social History:  Social History     Tobacco Use   • Smoking status: Never Smoker   • Smokeless tobacco: Never Used   Vaping Use   • Vaping Use: Never used   Substance Use Topics   • Alcohol use: No     Alcohol/week: 0.0 oz   • Drug use: No        Family History:   family history includes Diabetes in her brother and sister; Other in her sister.      PHYSICAL EXAM:   OBJECTIVE:  Vital signs: /58 (BP Location: Right arm, Patient Position: Sitting, BP Cuff Size: Adult)   Pulse 64   Ht 1.524 m (5')   Wt 64.6 kg (142 lb 6.4 oz)   LMP  (LMP Unknown)   SpO2 96%   BMI 27.81 kg/m²   GENERAL: Well-developed, well-nourished in no apparent distress.   EYE:  No ocular asymmetry, PERRLA  HENT: Pink, moist mucous membranes.    NECK: No thyromegaly.    CARDIOVASCULAR:  No murmurs, left AV fistula good bruit and thrill.  LUNGS: Clear breath sounds  ABDOMEN: Soft, nontender   EXTREMITIES: No clubbing, cyanosis, or edema.   NEUROLOGICAL: No gross focal motor abnormalities   LYMPH: No cervical adenopathy palpated.   SKIN: No rashes, lesions.       ASSESSMENT/PLAN:     1. Controlled type 2 diabetes mellitus with chronic kidney disease on chronic dialysis, without long-term current use of insulin (HCC)  Unstable.  True glycohemoglobin level cannot be determined currently.  Patient does not monitor home glucose or write down these details for us to further evaluate.    Continue pioglitazone 30 mg daily.    Continue balanced meal planning as per nephrology recommendations.  Follow fluid restriction guidelines as per nephrology recommendations.  Daily foot inspections are recommended.  Exercise as able.  Dilated eye exam is up to date.     2. Subclinical hypothyroidism  Unstable. Need current thyroid panel.  Continue levothyroxine 50 mcg daily.    3. Mixed hyperlipidemia  Stable.  Continue to monitor labs in the next 6 months.    4. Essential hypertension  Stable.  Continue lisinopril 40 mg daily.    5. Long term (current) use of oral hypoglycemic drugs  Stable.  Will reassess glycohemoglobin and blood sugars at next appointment.    6.  Neuropathic pain  Unstable.  Recommend increasing gabapentin 300mg BID. Patient can continue taking Requip before dialysis.    Explained in detail to patient that both medications will increase fatigue.  Again patient should start this routine in the evening as she is going to bed.  And then reassess which medication she may need in the morning to reduce the paresthesias and numbness to her bilateral lower extremities.    Repeat labs in 3 months.  Next appointment in 3 months.    Thank you kindly for allowing me to participate in the diabetes care plan for this patient.    Yanna Brandt, APRN  05/03/2021    CC:   Kathy Melgar,  ANGELITA

## 2021-08-09 ENCOUNTER — HOSPITAL ENCOUNTER (EMERGENCY)
Facility: MEDICAL CENTER | Age: 71
End: 2021-08-09
Attending: EMERGENCY MEDICINE
Payer: MEDICARE

## 2021-08-09 VITALS
RESPIRATION RATE: 20 BRPM | HEART RATE: 55 BPM | BODY MASS INDEX: 26.41 KG/M2 | OXYGEN SATURATION: 99 % | DIASTOLIC BLOOD PRESSURE: 59 MMHG | WEIGHT: 143.52 LBS | TEMPERATURE: 98 F | HEIGHT: 62 IN | SYSTOLIC BLOOD PRESSURE: 177 MMHG

## 2021-08-09 DIAGNOSIS — I95.9 TRANSIENT HYPOTENSION: ICD-10-CM

## 2021-08-09 DIAGNOSIS — R25.2 LEG CRAMPS: ICD-10-CM

## 2021-08-09 DIAGNOSIS — N18.9 CHRONIC RENAL FAILURE, UNSPECIFIED CKD STAGE: ICD-10-CM

## 2021-08-09 DIAGNOSIS — R25.2 BILATERAL LEG CRAMPS: ICD-10-CM

## 2021-08-09 DIAGNOSIS — R11.0 NAUSEA: ICD-10-CM

## 2021-08-09 LAB
ALBUMIN SERPL BCP-MCNC: 4.4 G/DL (ref 3.2–4.9)
ALBUMIN/GLOB SERPL: 1.3 G/DL
ALP SERPL-CCNC: 115 U/L (ref 30–99)
ALT SERPL-CCNC: 16 U/L (ref 2–50)
ANION GAP SERPL CALC-SCNC: 14 MMOL/L (ref 7–16)
AST SERPL-CCNC: 16 U/L (ref 12–45)
BASOPHILS # BLD AUTO: 0.4 % (ref 0–1.8)
BASOPHILS # BLD: 0.01 K/UL (ref 0–0.12)
BILIRUB SERPL-MCNC: 0.4 MG/DL (ref 0.1–1.5)
BUN SERPL-MCNC: 22 MG/DL (ref 8–22)
CALCIUM SERPL-MCNC: 9.8 MG/DL (ref 8.4–10.2)
CHLORIDE SERPL-SCNC: 92 MMOL/L (ref 96–112)
CO2 SERPL-SCNC: 20 MMOL/L (ref 20–33)
CREAT SERPL-MCNC: 4.95 MG/DL (ref 0.5–1.4)
EKG IMPRESSION: NORMAL
EOSINOPHIL # BLD AUTO: 0.02 K/UL (ref 0–0.51)
EOSINOPHIL NFR BLD: 0.8 % (ref 0–6.9)
ERYTHROCYTE [DISTWIDTH] IN BLOOD BY AUTOMATED COUNT: 49.3 FL (ref 35.9–50)
GLOBULIN SER CALC-MCNC: 3.4 G/DL (ref 1.9–3.5)
GLUCOSE SERPL-MCNC: 102 MG/DL (ref 65–99)
HCT VFR BLD AUTO: 35.4 % (ref 37–47)
HGB BLD-MCNC: 11.5 G/DL (ref 12–16)
IMM GRANULOCYTES # BLD AUTO: 0.02 K/UL (ref 0–0.11)
IMM GRANULOCYTES NFR BLD AUTO: 0.8 % (ref 0–0.9)
LYMPHOCYTES # BLD AUTO: 0.37 K/UL (ref 1–4.8)
LYMPHOCYTES NFR BLD: 15.2 % (ref 22–41)
MCH RBC QN AUTO: 31.2 PG (ref 27–33)
MCHC RBC AUTO-ENTMCNC: 32.5 G/DL (ref 33.6–35)
MCV RBC AUTO: 95.9 FL (ref 81.4–97.8)
MONOCYTES # BLD AUTO: 0.24 K/UL (ref 0–0.85)
MONOCYTES NFR BLD AUTO: 9.8 % (ref 0–13.4)
NEUTROPHILS # BLD AUTO: 1.78 K/UL (ref 2–7.15)
NEUTROPHILS NFR BLD: 73 % (ref 44–72)
NRBC # BLD AUTO: 0 K/UL
NRBC BLD-RTO: 0 /100 WBC
PLATELET # BLD AUTO: 142 K/UL (ref 164–446)
PMV BLD AUTO: 10.6 FL (ref 9–12.9)
POTASSIUM SERPL-SCNC: 4.6 MMOL/L (ref 3.6–5.5)
PROT SERPL-MCNC: 7.8 G/DL (ref 6–8.2)
RBC # BLD AUTO: 3.69 M/UL (ref 4.2–5.4)
SODIUM SERPL-SCNC: 126 MMOL/L (ref 135–145)
WBC # BLD AUTO: 2.4 K/UL (ref 4.8–10.8)

## 2021-08-09 PROCEDURE — 700102 HCHG RX REV CODE 250 W/ 637 OVERRIDE(OP): Performed by: EMERGENCY MEDICINE

## 2021-08-09 PROCEDURE — 700111 HCHG RX REV CODE 636 W/ 250 OVERRIDE (IP): Performed by: EMERGENCY MEDICINE

## 2021-08-09 PROCEDURE — 93005 ELECTROCARDIOGRAM TRACING: CPT | Performed by: EMERGENCY MEDICINE

## 2021-08-09 PROCEDURE — 96374 THER/PROPH/DIAG INJ IV PUSH: CPT

## 2021-08-09 PROCEDURE — 85025 COMPLETE CBC W/AUTO DIFF WBC: CPT

## 2021-08-09 PROCEDURE — 99285 EMERGENCY DEPT VISIT HI MDM: CPT

## 2021-08-09 PROCEDURE — A9270 NON-COVERED ITEM OR SERVICE: HCPCS | Performed by: EMERGENCY MEDICINE

## 2021-08-09 PROCEDURE — 36415 COLL VENOUS BLD VENIPUNCTURE: CPT

## 2021-08-09 PROCEDURE — 80053 COMPREHEN METABOLIC PANEL: CPT

## 2021-08-09 RX ORDER — METHOCARBAMOL 500 MG/1
500 TABLET, FILM COATED ORAL ONCE
Status: COMPLETED | OUTPATIENT
Start: 2021-08-09 | End: 2021-08-09

## 2021-08-09 RX ORDER — ACETAMINOPHEN 325 MG/1
650 TABLET ORAL ONCE
Status: COMPLETED | OUTPATIENT
Start: 2021-08-09 | End: 2021-08-09

## 2021-08-09 RX ORDER — ONDANSETRON 4 MG/1
4 TABLET, ORALLY DISINTEGRATING ORAL EVERY 8 HOURS PRN
Qty: 10 TABLET | Refills: 0 | Status: SHIPPED | OUTPATIENT
Start: 2021-08-09 | End: 2021-11-10

## 2021-08-09 RX ORDER — ONDANSETRON 2 MG/ML
4 INJECTION INTRAMUSCULAR; INTRAVENOUS ONCE
Status: COMPLETED | OUTPATIENT
Start: 2021-08-09 | End: 2021-08-09

## 2021-08-09 RX ORDER — METHOCARBAMOL 500 MG/1
500 TABLET, FILM COATED ORAL 3 TIMES DAILY PRN
Qty: 30 TABLET | Refills: 0 | Status: ON HOLD | OUTPATIENT
Start: 2021-08-09 | End: 2021-08-13

## 2021-08-09 RX ADMIN — ONDANSETRON 4 MG: 2 INJECTION INTRAMUSCULAR; INTRAVENOUS at 13:43

## 2021-08-09 RX ADMIN — ACETAMINOPHEN 650 MG: 325 TABLET, FILM COATED ORAL at 13:44

## 2021-08-09 RX ADMIN — METHOCARBAMOL 500 MG: 500 TABLET ORAL at 14:59

## 2021-08-09 ASSESSMENT — PAIN DESCRIPTION - PAIN TYPE: TYPE: ACUTE PAIN

## 2021-08-09 ASSESSMENT — FIBROSIS 4 INDEX: FIB4 SCORE: 2.58

## 2021-08-09 NOTE — ED TRIAGE NOTES
Sent here for low blood pressure and vomited x one after dialysis. Current vitals are hr 54 /64 RR 16  02 sat 100%. Patient is alert and oriented and denies any pain but says both her arms and legs are numb.

## 2021-08-09 NOTE — ED PROVIDER NOTES
ED Provider Note    CHIEF COMPLAINT  Chief Complaint   Patient presents with   • Blood Pressure Problem     Sent here after dialysis for low blood pressure  Numbness to both arms and legs       HPI  Tere Gallegos is a 70 y.o. female who presents after nausea and vomiting, low blood pressure while receiving dialysis.  She stated she felt well this morning.  She denies similar complaints in the past.  Mild left posterior headache she states is now near resolved.  She vomited once at dialysis with persistent nausea.  No diarrhea.  No chest pain, no abdominal pain.  She states she is feeling improved.  Blood pressure here 160 systolic, was reported as low in the clinic.  Patient complains of leg cramping, is intermittent bilateral, usually worse at night according to her family member.  He states the problem has now been there for at least 5 years.    REVIEW OF SYSTEMS    Constitutional: No fever  Respiratory: No shortness of breath  Cardiac: No chest pain  Gastrointestinal: No abdominal pain.  Nausea  Musculoskeletal: Chronic leg cramping  Neurologic: No headache       All other systems are negative.       PAST MEDICAL HISTORY  Past Medical History:   Diagnosis Date   • Acquired hypothyroidism 5/4/2020   • CAD (coronary artery disease)    • Chronic diastolic heart failure (HCC) 5/4/2020   • Coronary artery disease due to lipid rich plaque     2 Synergy NOEMI to 100% RCA stent placed   • Dental disorder     partial dentures- uppers   • Diabetes ([facility])     oral medication   • ESRD (end stage renal disease) on dialysis ([facility]) 5/4/2020   • Hemodialysis patient ([facility])     M, W, F   • Hyperlipidemia    • Hypertension    • Kidney transplant candidate    • Presence of drug-eluting stent in right coronary artery    • QT prolongation 1/22/2020   • RLS (restless legs syndrome) 8/5/2016   • Transaminitis 12/22/2018       FAMILY HISTORY  Family History   Problem Relation Age of Onset   • Diabetes Sister    • Other Sister          liver disease   • Diabetes Brother    • Heart Disease Neg Hx        SOCIAL HISTORY  Social History     Socioeconomic History   • Marital status:      Spouse name: Not on file   • Number of children: Not on file   • Years of education: Not on file   • Highest education level: Not on file   Occupational History   • Not on file   Tobacco Use   • Smoking status: Never Smoker   • Smokeless tobacco: Never Used   Vaping Use   • Vaping Use: Never used   Substance and Sexual Activity   • Alcohol use: No     Alcohol/week: 0.0 oz   • Drug use: No   • Sexual activity: Never   Other Topics Concern   • Not on file   Social History Narrative   • Not on file     Social Determinants of Health     Financial Resource Strain:    • Difficulty of Paying Living Expenses:    Food Insecurity:    • Worried About Running Out of Food in the Last Year:    • Ran Out of Food in the Last Year:    Transportation Needs:    • Lack of Transportation (Medical):    • Lack of Transportation (Non-Medical):    Physical Activity:    • Days of Exercise per Week:    • Minutes of Exercise per Session:    Stress:    • Feeling of Stress :    Social Connections:    • Frequency of Communication with Friends and Family:    • Frequency of Social Gatherings with Friends and Family:    • Attends Faith Services:    • Active Member of Clubs or Organizations:    • Attends Club or Organization Meetings:    • Marital Status:    Intimate Partner Violence:    • Fear of Current or Ex-Partner:    • Emotionally Abused:    • Physically Abused:    • Sexually Abused:        SURGICAL HISTORY  Past Surgical History:   Procedure Laterality Date   • ZZZ CARDIAC CATH  9/7/2016    RCA stented with 2 Synergy drug-eluting stents.   • RECOVERY  8/16/2016    Procedure: CATH LAB [facility] WITH POSSIBLE DR. CASTILLO;  Surgeon: Recoveryonly Surgery;  Location: SURGERY PRE-POST PROC UNIT [facility];  Service:    • ZZZ CARDIAC CATH  8/16/16    100% RCA   • OTHER Left 2014    left arm upper  "extremity fistula   • OTHER ABDOMINAL SURGERY      left kidney removed due to cancer       CURRENT MEDICATIONS  Home Medications    **Home medications have not yet been reviewed for this encounter**         ALLERGIES  No Known Allergies    PHYSICAL EXAM  VITAL SIGNS: BP (!) 169/64   Pulse (!) 55   Temp 36.1 °C (96.9 °F) (Temporal)   Resp 16   Ht 1.575 m (5' 2\")   Wt 65.1 kg (143 lb 8.3 oz)   LMP  (LMP Unknown)   SpO2 100%   BMI 26.25 kg/m²   Constitutional:  Non-toxic appearance.   HENT: No facial swelling  Eyes: Anicteric, no conjunctivitis.     Cardiovascular: Normal heart rate, Normal rhythm.  Pulmonary:  No wheezing, No rales.   Gastrointestinal: Soft, No tenderness, No distention or mass  Skin: Warm, Dry, No cyanosis.  No asymmetric edema  Neurologic: Speech is clear, follows commands, facial expression is symmetrical.  Strength is normal  Psychiatric: Affect normal,  Mood normal.  Patient is calm and cooperative  Musculoskeletal: No chest wall tenderness    EKG/Labs  Results for orders placed or performed during the hospital encounter of 08/09/21   CBC WITH DIFFERENTIAL   Result Value Ref Range    WBC 2.4 (LL) 4.8 - 10.8 K/uL    RBC 3.69 (L) 4.20 - 5.40 M/uL    Hemoglobin 11.5 (L) 12.0 - 16.0 g/dL    Hematocrit 35.4 (L) 37.0 - 47.0 %    MCV 95.9 81.4 - 97.8 fL    MCH 31.2 27.0 - 33.0 pg    MCHC 32.5 (L) 33.6 - 35.0 g/dL    RDW 49.3 35.9 - 50.0 fL    Platelet Count 142 (L) 164 - 446 K/uL    MPV 10.6 9.0 - 12.9 fL    Neutrophils-Polys 73.00 (H) 44.00 - 72.00 %    Lymphocytes 15.20 (L) 22.00 - 41.00 %    Monocytes 9.80 0.00 - 13.40 %    Eosinophils 0.80 0.00 - 6.90 %    Basophils 0.40 0.00 - 1.80 %    Immature Granulocytes 0.80 0.00 - 0.90 %    Nucleated RBC 0.00 /100 WBC    Neutrophils (Absolute) 1.78 (L) 2.00 - 7.15 K/uL    Lymphs (Absolute) 0.37 (L) 1.00 - 4.80 K/uL    Monos (Absolute) 0.24 0.00 - 0.85 K/uL    Eos (Absolute) 0.02 0.00 - 0.51 K/uL    Baso (Absolute) 0.01 0.00 - 0.12 K/uL    Immature " Granulocytes (abs) 0.02 0.00 - 0.11 K/uL    NRBC (Absolute) 0.00 K/uL   COMP METABOLIC PANEL   Result Value Ref Range    Sodium 126 (L) 135 - 145 mmol/L    Potassium 4.6 3.6 - 5.5 mmol/L    Chloride 92 (L) 96 - 112 mmol/L    Co2 20 20 - 33 mmol/L    Anion Gap 14.0 7.0 - 16.0    Glucose 102 (H) 65 - 99 mg/dL    Bun 22 8 - 22 mg/dL    Creatinine 4.95 (H) 0.50 - 1.40 mg/dL    Calcium 9.8 8.4 - 10.2 mg/dL    AST(SGOT) 16 12 - 45 U/L    ALT(SGPT) 16 2 - 50 U/L    Alkaline Phosphatase 115 (H) 30 - 99 U/L    Total Bilirubin 0.4 0.1 - 1.5 mg/dL    Albumin 4.4 3.2 - 4.9 g/dL    Total Protein 7.8 6.0 - 8.2 g/dL    Globulin 3.4 1.9 - 3.5 g/dL    A-G Ratio 1.3 g/dL   ESTIMATED GFR   Result Value Ref Range    GFR If African American 10 (A) >60 mL/min/1.73 m 2    GFR If Non African American 9 (A) >60 mL/min/1.73 m 2   EKG (NOW)   Result Value Ref Range    Report       [facility] Emergency Dept.    Test Date:  2021  Pt Name:    DIOR NICHOLS    Department: [facility]  MRN:        [id number]                      [facility]  Gender:     Female                       Technician: VIANNEY  :        [id number]                   Requested By:MICHELLE JEFF  Order #:    [id number]                    Reading MD: MICHELLE JEFF MD    Measurements  Intervals                                Axis  Rate:       50                           P:          -60  AR:         168                          QRS:        72  QRSD:       88                           T:          102  QT:         544  QTc:        497    Interpretive Statements  SINUS OR ECTOPIC ATRIAL BRADYCARDIA  NONSPECIFIC T ABNORMALITIES, LATERAL LEADS  BORDERLINE PROLONGED QT INTERVAL  Compared to ECG 2021 11:07:31  No ischemic change  T wave abnormalities are now less prominent  Electronically Signed On 2021 18:01:45 PDT by MICHELLE JEFF MD               COURSE & MEDICAL DECISION MAKING  Pertinent Labs & Imaging studies  reviewed. (See chart for details)  Patient with transient hypotension at her dialysis today of unknown etiology.  Here has been normotensive or hypertensive.  She has chronic renal failure, no evidence of fluid overload at this time, no hyperkalemia.  She does not meet criteria for emergent dialysis.  Her nausea etiology is unknown.  This was treated well with Zofran, now tolerating oral intake.  The patient's family member states she has had leg cannabis for at least 5 years.  After discussion with our pharmacist, plan for methocarbamol as needed for muscle cramping.  She is advised to follow the primary doctor for recheck as soon as possible.  Patient is well-appearing upon discharge    FINAL IMPRESSION     1. Transient hypotension     2. Chronic renal failure, unspecified CKD stage     3. Leg cramps     4. Nausea     5. Bilateral leg cramps                     Electronically signed by: Ulisses Landon M.D., 8/9/2021 1:30 PM

## 2021-08-11 ENCOUNTER — APPOINTMENT (OUTPATIENT)
Dept: RADIOLOGY | Facility: MEDICAL CENTER | Age: 71
DRG: 308 | End: 2021-08-11
Attending: EMERGENCY MEDICINE
Payer: MEDICARE

## 2021-08-11 ENCOUNTER — HOSPITAL ENCOUNTER (INPATIENT)
Facility: MEDICAL CENTER | Age: 71
LOS: 1 days | DRG: 308 | End: 2021-08-13
Attending: EMERGENCY MEDICINE | Admitting: INTERNAL MEDICINE
Payer: MEDICARE

## 2021-08-11 DIAGNOSIS — R07.9 CHEST PAIN, UNSPECIFIED TYPE: ICD-10-CM

## 2021-08-11 DIAGNOSIS — N18.6 ESRD (END STAGE RENAL DISEASE) (HCC): ICD-10-CM

## 2021-08-11 DIAGNOSIS — I48.91 NEW ONSET ATRIAL FIBRILLATION (HCC): ICD-10-CM

## 2021-08-11 DIAGNOSIS — D70.9 NEUTROPENIA, UNSPECIFIED TYPE (HCC): ICD-10-CM

## 2021-08-11 DIAGNOSIS — I48.91 ATRIAL FIBRILLATION WITH RVR (HCC): ICD-10-CM

## 2021-08-11 PROBLEM — D72.819 LEUKOPENIA: Status: ACTIVE | Noted: 2021-08-11

## 2021-08-11 LAB
ALBUMIN SERPL BCP-MCNC: 3.8 G/DL (ref 3.2–4.9)
ALBUMIN/GLOB SERPL: 1.5 G/DL
ALP SERPL-CCNC: 102 U/L (ref 30–99)
ALT SERPL-CCNC: 15 U/L (ref 2–50)
ANION GAP SERPL CALC-SCNC: 15 MMOL/L (ref 7–16)
APTT PPP: 31.9 SEC (ref 24.7–36)
AST SERPL-CCNC: 15 U/L (ref 12–45)
BASOPHILS # BLD AUTO: 0.6 % (ref 0–1.8)
BASOPHILS # BLD: 0.01 K/UL (ref 0–0.12)
BILIRUB SERPL-MCNC: 0.3 MG/DL (ref 0.1–1.5)
BUN SERPL-MCNC: 21 MG/DL (ref 8–22)
CALCIUM SERPL-MCNC: 8.9 MG/DL (ref 8.5–10.5)
CHLORIDE SERPL-SCNC: 93 MMOL/L (ref 96–112)
CO2 SERPL-SCNC: 24 MMOL/L (ref 20–33)
CREAT SERPL-MCNC: 4.51 MG/DL (ref 0.5–1.4)
EKG IMPRESSION: NORMAL
EKG IMPRESSION: NORMAL
EOSINOPHIL # BLD AUTO: 0.02 K/UL (ref 0–0.51)
EOSINOPHIL NFR BLD: 1.1 % (ref 0–6.9)
ERYTHROCYTE [DISTWIDTH] IN BLOOD BY AUTOMATED COUNT: 46.6 FL (ref 35.9–50)
GLOBULIN SER CALC-MCNC: 2.6 G/DL (ref 1.9–3.5)
GLUCOSE SERPL-MCNC: 94 MG/DL (ref 65–99)
HAV IGM SERPL QL IA: NORMAL
HBV CORE IGM SER QL: NORMAL
HBV SURFACE AB SERPL IA-ACNC: 10.1 MIU/ML (ref 0–10)
HBV SURFACE AG SER QL: NORMAL
HCT VFR BLD AUTO: 34.7 % (ref 37–47)
HCV AB SER QL: NORMAL
HGB BLD-MCNC: 11.5 G/DL (ref 12–16)
IMM GRANULOCYTES # BLD AUTO: 0.01 K/UL (ref 0–0.11)
IMM GRANULOCYTES NFR BLD AUTO: 0.6 % (ref 0–0.9)
INR PPP: 1.04 (ref 0.87–1.13)
LYMPHOCYTES # BLD AUTO: 0.36 K/UL (ref 1–4.8)
LYMPHOCYTES NFR BLD: 20 % (ref 22–41)
MAGNESIUM SERPL-MCNC: 2.1 MG/DL (ref 1.5–2.5)
MCH RBC QN AUTO: 31.1 PG (ref 27–33)
MCHC RBC AUTO-ENTMCNC: 33.1 G/DL (ref 33.6–35)
MCV RBC AUTO: 93.8 FL (ref 81.4–97.8)
MONOCYTES # BLD AUTO: 0.17 K/UL (ref 0–0.85)
MONOCYTES NFR BLD AUTO: 9.4 % (ref 0–13.4)
NEUTROPHILS # BLD AUTO: 1.23 K/UL (ref 2–7.15)
NEUTROPHILS NFR BLD: 68.3 % (ref 44–72)
NRBC # BLD AUTO: 0 K/UL
NRBC BLD-RTO: 0 /100 WBC
NT-PROBNP SERPL IA-MCNC: 8305 PG/ML (ref 0–125)
PLATELET # BLD AUTO: 139 K/UL (ref 164–446)
PMV BLD AUTO: 10.6 FL (ref 9–12.9)
POTASSIUM SERPL-SCNC: 3.4 MMOL/L (ref 3.6–5.5)
PROT SERPL-MCNC: 6.4 G/DL (ref 6–8.2)
PROTHROMBIN TIME: 13.3 SEC (ref 12–14.6)
RBC # BLD AUTO: 3.7 M/UL (ref 4.2–5.4)
SODIUM SERPL-SCNC: 132 MMOL/L (ref 135–145)
TROPONIN T SERPL-MCNC: 165 NG/L (ref 6–19)
TROPONIN T SERPL-MCNC: 256 NG/L (ref 6–19)
TROPONIN T SERPL-MCNC: 64 NG/L (ref 6–19)
UFH PPP CHRO-ACNC: <0.1 IU/ML
WBC # BLD AUTO: 1.8 K/UL (ref 4.8–10.8)

## 2021-08-11 PROCEDURE — 700102 HCHG RX REV CODE 250 W/ 637 OVERRIDE(OP): Performed by: STUDENT IN AN ORGANIZED HEALTH CARE EDUCATION/TRAINING PROGRAM

## 2021-08-11 PROCEDURE — 85730 THROMBOPLASTIN TIME PARTIAL: CPT

## 2021-08-11 PROCEDURE — 36415 COLL VENOUS BLD VENIPUNCTURE: CPT

## 2021-08-11 PROCEDURE — 85025 COMPLETE CBC W/AUTO DIFF WBC: CPT

## 2021-08-11 PROCEDURE — 85610 PROTHROMBIN TIME: CPT

## 2021-08-11 PROCEDURE — 86706 HEP B SURFACE ANTIBODY: CPT

## 2021-08-11 PROCEDURE — G0378 HOSPITAL OBSERVATION PER HR: HCPCS

## 2021-08-11 PROCEDURE — 99285 EMERGENCY DEPT VISIT HI MDM: CPT

## 2021-08-11 PROCEDURE — 99215 OFFICE O/P EST HI 40 MIN: CPT | Performed by: INTERNAL MEDICINE

## 2021-08-11 PROCEDURE — 83880 ASSAY OF NATRIURETIC PEPTIDE: CPT

## 2021-08-11 PROCEDURE — 84484 ASSAY OF TROPONIN QUANT: CPT

## 2021-08-11 PROCEDURE — 96365 THER/PROPH/DIAG IV INF INIT: CPT

## 2021-08-11 PROCEDURE — 85520 HEPARIN ASSAY: CPT

## 2021-08-11 PROCEDURE — 700111 HCHG RX REV CODE 636 W/ 250 OVERRIDE (IP): Performed by: INTERNAL MEDICINE

## 2021-08-11 PROCEDURE — 93005 ELECTROCARDIOGRAM TRACING: CPT | Performed by: EMERGENCY MEDICINE

## 2021-08-11 PROCEDURE — 80053 COMPREHEN METABOLIC PANEL: CPT

## 2021-08-11 PROCEDURE — 96366 THER/PROPH/DIAG IV INF ADDON: CPT

## 2021-08-11 PROCEDURE — 71045 X-RAY EXAM CHEST 1 VIEW: CPT

## 2021-08-11 PROCEDURE — 99220 PR INITIAL OBSERVATION CARE,LEVL III: CPT | Mod: AI,GC | Performed by: INTERNAL MEDICINE

## 2021-08-11 PROCEDURE — 96375 TX/PRO/DX INJ NEW DRUG ADDON: CPT

## 2021-08-11 PROCEDURE — A9270 NON-COVERED ITEM OR SERVICE: HCPCS | Performed by: STUDENT IN AN ORGANIZED HEALTH CARE EDUCATION/TRAINING PROGRAM

## 2021-08-11 PROCEDURE — 83735 ASSAY OF MAGNESIUM: CPT

## 2021-08-11 PROCEDURE — 80074 ACUTE HEPATITIS PANEL: CPT

## 2021-08-11 RX ORDER — CARVEDILOL 25 MG/1
25 TABLET ORAL 2 TIMES DAILY WITH MEALS
Status: DISCONTINUED | OUTPATIENT
Start: 2021-08-11 | End: 2021-08-12

## 2021-08-11 RX ORDER — PIOGLITAZONEHYDROCHLORIDE 30 MG/1
30 TABLET ORAL DAILY
Status: DISCONTINUED | OUTPATIENT
Start: 2021-08-12 | End: 2021-08-13 | Stop reason: HOSPADM

## 2021-08-11 RX ORDER — FUROSEMIDE 80 MG
80 TABLET ORAL 3 TIMES DAILY
COMMUNITY
End: 2021-11-10

## 2021-08-11 RX ORDER — AMLODIPINE BESYLATE 10 MG/1
10 TABLET ORAL
Status: DISCONTINUED | OUTPATIENT
Start: 2021-08-12 | End: 2021-08-13 | Stop reason: HOSPADM

## 2021-08-11 RX ORDER — ONDANSETRON 4 MG/1
4 TABLET, ORALLY DISINTEGRATING ORAL EVERY 8 HOURS PRN
Status: DISCONTINUED | OUTPATIENT
Start: 2021-08-11 | End: 2021-08-13 | Stop reason: HOSPADM

## 2021-08-11 RX ORDER — METHOCARBAMOL 500 MG/1
500 TABLET, FILM COATED ORAL 3 TIMES DAILY PRN
Status: DISCONTINUED | OUTPATIENT
Start: 2021-08-11 | End: 2021-08-13 | Stop reason: HOSPADM

## 2021-08-11 RX ORDER — SODIUM CHLORIDE 9 MG/ML
250 INJECTION, SOLUTION INTRAVENOUS
Status: DISCONTINUED | OUTPATIENT
Start: 2021-08-11 | End: 2021-08-13 | Stop reason: HOSPADM

## 2021-08-11 RX ORDER — POTASSIUM CHLORIDE 20 MEQ/1
20 TABLET, EXTENDED RELEASE ORAL ONCE
Status: COMPLETED | OUTPATIENT
Start: 2021-08-11 | End: 2021-08-11

## 2021-08-11 RX ORDER — HEPARIN SODIUM 1000 [USP'U]/ML
30 INJECTION, SOLUTION INTRAVENOUS; SUBCUTANEOUS PRN
Status: DISCONTINUED | OUTPATIENT
Start: 2021-08-11 | End: 2021-08-12

## 2021-08-11 RX ORDER — METOPROLOL TARTRATE 1 MG/ML
5 INJECTION, SOLUTION INTRAVENOUS ONCE
Status: DISCONTINUED | OUTPATIENT
Start: 2021-08-11 | End: 2021-08-11

## 2021-08-11 RX ORDER — GABAPENTIN 300 MG/1
600 CAPSULE ORAL
Status: DISCONTINUED | OUTPATIENT
Start: 2021-08-11 | End: 2021-08-13 | Stop reason: HOSPADM

## 2021-08-11 RX ORDER — ATORVASTATIN CALCIUM 80 MG/1
80 TABLET, FILM COATED ORAL EVERY EVENING
Status: DISCONTINUED | OUTPATIENT
Start: 2021-08-11 | End: 2021-08-13 | Stop reason: HOSPADM

## 2021-08-11 RX ORDER — SEVELAMER CARBONATE 800 MG/1
800 TABLET, FILM COATED ORAL
Status: DISCONTINUED | OUTPATIENT
Start: 2021-08-11 | End: 2021-08-13 | Stop reason: HOSPADM

## 2021-08-11 RX ORDER — HEPARIN SODIUM 1000 [USP'U]/ML
60 INJECTION, SOLUTION INTRAVENOUS; SUBCUTANEOUS ONCE
Status: COMPLETED | OUTPATIENT
Start: 2021-08-11 | End: 2021-08-11

## 2021-08-11 RX ORDER — FUROSEMIDE 40 MG/1
80 TABLET ORAL 3 TIMES DAILY
Status: DISCONTINUED | OUTPATIENT
Start: 2021-08-11 | End: 2021-08-13 | Stop reason: HOSPADM

## 2021-08-11 RX ORDER — LEVOTHYROXINE SODIUM 0.05 MG/1
50 TABLET ORAL
Status: DISCONTINUED | OUTPATIENT
Start: 2021-08-12 | End: 2021-08-13 | Stop reason: HOSPADM

## 2021-08-11 RX ORDER — HYDRALAZINE HYDROCHLORIDE 50 MG/1
100 TABLET, FILM COATED ORAL 2 TIMES DAILY
Status: DISCONTINUED | OUTPATIENT
Start: 2021-08-11 | End: 2021-08-13 | Stop reason: HOSPADM

## 2021-08-11 RX ORDER — HEPARIN SODIUM 1000 [USP'U]/ML
60 INJECTION, SOLUTION INTRAVENOUS; SUBCUTANEOUS ONCE
Status: DISCONTINUED | OUTPATIENT
Start: 2021-08-11 | End: 2021-08-11

## 2021-08-11 RX ORDER — HEPARIN SODIUM 5000 [USP'U]/100ML
0-30 INJECTION, SOLUTION INTRAVENOUS CONTINUOUS
Status: DISCONTINUED | OUTPATIENT
Start: 2021-08-11 | End: 2021-08-11

## 2021-08-11 RX ORDER — ROPINIROLE 0.5 MG/1
1 TABLET, FILM COATED ORAL 2 TIMES DAILY
Status: DISCONTINUED | OUTPATIENT
Start: 2021-08-11 | End: 2021-08-13 | Stop reason: HOSPADM

## 2021-08-11 RX ORDER — POLYETHYLENE GLYCOL 3350 17 G/17G
1 POWDER, FOR SOLUTION ORAL
Status: DISCONTINUED | OUTPATIENT
Start: 2021-08-11 | End: 2021-08-13 | Stop reason: HOSPADM

## 2021-08-11 RX ORDER — HEPARIN SODIUM 1000 [USP'U]/ML
30 INJECTION, SOLUTION INTRAVENOUS; SUBCUTANEOUS PRN
Status: DISCONTINUED | OUTPATIENT
Start: 2021-08-11 | End: 2021-08-11

## 2021-08-11 RX ORDER — MORPHINE SULFATE 4 MG/ML
4 INJECTION, SOLUTION INTRAMUSCULAR; INTRAVENOUS ONCE
Status: DISCONTINUED | OUTPATIENT
Start: 2021-08-11 | End: 2021-08-11

## 2021-08-11 RX ORDER — AMOXICILLIN 250 MG
2 CAPSULE ORAL 2 TIMES DAILY
Status: DISCONTINUED | OUTPATIENT
Start: 2021-08-11 | End: 2021-08-13 | Stop reason: HOSPADM

## 2021-08-11 RX ORDER — GABAPENTIN 300 MG/1
600 CAPSULE ORAL
COMMUNITY
End: 2021-08-18

## 2021-08-11 RX ORDER — HYDRALAZINE HYDROCHLORIDE 20 MG/ML
20 INJECTION INTRAMUSCULAR; INTRAVENOUS EVERY 6 HOURS PRN
Status: DISCONTINUED | OUTPATIENT
Start: 2021-08-11 | End: 2021-08-13 | Stop reason: HOSPADM

## 2021-08-11 RX ORDER — HEPARIN SODIUM 5000 [USP'U]/100ML
0-30 INJECTION, SOLUTION INTRAVENOUS CONTINUOUS
Status: DISCONTINUED | OUTPATIENT
Start: 2021-08-11 | End: 2021-08-12

## 2021-08-11 RX ORDER — LISINOPRIL 20 MG/1
40 TABLET ORAL DAILY
Status: DISCONTINUED | OUTPATIENT
Start: 2021-08-12 | End: 2021-08-13 | Stop reason: HOSPADM

## 2021-08-11 RX ORDER — BISACODYL 10 MG
10 SUPPOSITORY, RECTAL RECTAL
Status: DISCONTINUED | OUTPATIENT
Start: 2021-08-11 | End: 2021-08-13 | Stop reason: HOSPADM

## 2021-08-11 RX ADMIN — HYDRALAZINE HYDROCHLORIDE 100 MG: 50 TABLET, FILM COATED ORAL at 18:24

## 2021-08-11 RX ADMIN — ATORVASTATIN CALCIUM 80 MG: 80 TABLET, FILM COATED ORAL at 21:18

## 2021-08-11 RX ADMIN — POTASSIUM CHLORIDE 20 MEQ: 1500 TABLET, EXTENDED RELEASE ORAL at 21:17

## 2021-08-11 RX ADMIN — HEPARIN SODIUM 10.58 UNITS/KG/HR: 5000 INJECTION, SOLUTION INTRAVENOUS at 21:20

## 2021-08-11 RX ADMIN — FUROSEMIDE 80 MG: 40 TABLET ORAL at 21:17

## 2021-08-11 RX ADMIN — HEPARIN SODIUM 3400 UNITS: 1000 INJECTION, SOLUTION INTRAVENOUS; SUBCUTANEOUS at 21:20

## 2021-08-11 RX ADMIN — CARVEDILOL 25 MG: 25 TABLET, FILM COATED ORAL at 19:45

## 2021-08-11 RX ADMIN — ROPINIROLE HYDROCHLORIDE 1 MG: 0.5 TABLET, FILM COATED ORAL at 18:27

## 2021-08-11 ASSESSMENT — ENCOUNTER SYMPTOMS
HEADACHES: 0
LOSS OF CONSCIOUSNESS: 0
CONSTIPATION: 0
ABDOMINAL PAIN: 0
PALPITATIONS: 0
MYALGIAS: 0
WEIGHT LOSS: 0
SPUTUM PRODUCTION: 0
DIAPHORESIS: 0
PSYCHIATRIC NEGATIVE: 1
BLURRED VISION: 0
NAUSEA: 0
PALPITATIONS: 1
HEARTBURN: 0
TINGLING: 0
FALLS: 0
DOUBLE VISION: 0
VOMITING: 0
TREMORS: 0
CHILLS: 0
FEVER: 0
WEAKNESS: 0
ORTHOPNEA: 0
SHORTNESS OF BREATH: 0
DIARRHEA: 0
COUGH: 0
DIZZINESS: 0

## 2021-08-11 ASSESSMENT — FIBROSIS 4 INDEX
FIB4 SCORE: 2
FIB4 SCORE: 1.98
FIB4 SCORE: 1.98

## 2021-08-11 NOTE — ED NOTES
Hospitalist team at bedside. Report called over to TRAVIS Brito. Patient will be transported to yellow pod once MDs leave bedside

## 2021-08-11 NOTE — ED TRIAGE NOTES
Pt BIB [facility] with c/c of chest pain and rapid HR. Pt was getting dialysis today, unable to complete, when she started to experience chest pain. EMS was called. Pt found to be in Afib. Pt given 324mg of ASA, 1 nitro, 50mcg of fentanyl, and 4mg zofran PTA

## 2021-08-11 NOTE — ED PROVIDER NOTES
ED Provider Note    Scribed for Penelope Smith M.D. by Dot Navarrete. 8/11/2021  9:38 AM    Means of arrival: EMS  History obtained from:EMS and Patient  History limited by: None noted.      CHIEF COMPLAINT  Chief Complaint   Patient presents with   • Chest Pain   • Atrial Fibrillation       HPI  Tere Gallegos is a 70 y.o. female with history of end-stage renal disease as well as coronary artery disease who presents to the Emergency Department via EMS complaning of chest pain onset last night around 7 PM. She states that the pain radiates throughout her left arm, left hand, and back. She also has associated symptoms of nausea. She denies any shortness of breath or vomiting. She also denies a history of heart problems, A-fib, or being on blood thinners. She reports that she attends dialysis on Monday, Wednesday, and Friday and denies ever missing these appointments. EMS found her in atrial fibrillation with RVR.  She was given aspirin, fentanyl, nitro, and zofran en route.     REVIEW OF SYSTEMS  Pertinent positive include chest pain, left arm and hand pain, left back pain, and nausea. Pertinent negative include shortness of breath or vomitting. All other systems reviewed and are negative.    PAST MEDICAL HISTORY   has a past medical history of Acquired hypothyroidism (5/4/2020), CAD (coronary artery disease), Chronic diastolic heart failure (HCC) (5/4/2020), Coronary artery disease due to lipid rich plaque, Dental disorder, Diabetes ([facility]), ESRD (end stage renal disease) on dialysis ([facility]) (5/4/2020), Hemodialysis patient ([facility]), Hyperlipidemia, Hypertension, Kidney transplant candidate, Presence of drug-eluting stent in right coronary artery, QT prolongation (1/22/2020), RLS (restless legs syndrome) (8/5/2016), and Transaminitis (12/22/2018).    SOCIAL HISTORY  Social History     Tobacco Use   • Smoking status: Never Smoker   • Smokeless tobacco: Never Used   Vaping Use   • Vaping Use: Never used   Substance  and Sexual Activity   • Alcohol use: No     Alcohol/week: 0.0 oz   • Drug use: No   • Sexual activity: Never       SURGICAL HISTORY   has a past surgical history that includes recovery (8/16/2016); other abdominal surgery; other (Left, 2014); zzz cardiac cath (8/16/16); and zzz cardiac cath (9/7/2016).    CURRENT MEDICATIONS  Home Medications     Reviewed by Veronica Kelly (Pharmacy Tech) on 08/11/21 at 1136  Med List Status: Complete   Medication Last Dose Status   ASPIRIN LOW DOSE 81 MG Chew Tab chewable tablet 8/10/2021 Active   carvedilol (COREG) 25 MG Tab 8/10/2021 Active   furosemide (LASIX) 80 MG Tab 8/10/2021 Active   gabapentin (NEURONTIN) 300 MG Cap 8/10/2021 Active   hydrALAZINE (APRESOLINE) 100 MG tablet 8/10/2021 Active   ibuprofen (MOTRIN) 800 MG Tab unknown Active   levothyroxine (SYNTHROID) 50 MCG Tab 8/10/2021 Active   lisinopril (PRINIVIL) 40 MG tablet 8/10/2021 Active   methocarbamol (ROBAXIN) 500 MG Tab unknown Active   ondansetron (ZOFRAN ODT) 4 MG TABLET DISPERSIBLE unknown Active   pioglitazone (ACTOS) 30 MG Tab 8/10/2021 Active   ROPINIRole (REQUIP) 1 MG Tab 8/10/2021 Active   sevelamer carbonate (RENVELA) 800 MG Tab tablet 8/10/2021 Active                ALLERGIES  No Known Allergies    PHYSICAL EXAM   VITAL SIGNS: /68   Pulse (!) 144   Resp 19   Wt 65.3 kg (144 lb)   LMP  (LMP Unknown)   SpO2 97%   BMI 26.34 kg/m²    Constitutional: Nontoxic appearing  female, Alert in no apparent distress.  HENT: Normocephalic, Atraumatic. Bilateral external ears normal. Nose normal.  Moist mucous membranes.  Oropharynx clear.  Eyes: Pupils are equal and reactive. Conjunctiva normal.   Neck: Supple, full range of motion  Heart:  Tachycardic, irregular rate, Regular rhythm.  No murmurs, No edema.   Lungs: No respiratory distress, normal work of breathing. Lungs clear to auscultation bilaterally.  Abdomen Fistula in place in left upper quadrant with good thrill, Soft, no distention.   No tenderness to palpation.  Musculoskeletal: Atraumatic. No obvious deformities noted.  No lower extremity edema.  Skin: Warm, Dry.  No erythema, No rash.   Neurologic: Alert and oriented x3. Moving all extremities spontaneously without focal deficits.  Psychiatric: Affect normal, Mood normal, Appears appropriate and not intoxicated.    DIAGNOSTIC STUDIES    EKG  Results for orders placed or performed during the hospital encounter of 21   EKG (NOW)   Result Value Ref Range    Report       [facility]    Test Date:  2021  Pt Name:    DIOR NICHOLS    Department: ER  MRN:        [id number]                      Room:       RD 04  Gender:     Female                       Technician: [id number]  :        1950                   Requested By:ER TRIAGE PROTOCOL  Order #:    [id number]                    Reading MD: Penelope Smith MD    Measurements  Intervals                                Axis  Rate:       134                          P:  MI:                                      QRS:        66  QRSD:       90                           T:          204  QT:         356  QTc:        532    Interpretive Statements  ATRIAL FIBRILLATION  PAIRED VENTRICULAR PREMATURE COMPLEXES  REPOL ABNRM SUGGESTS ISCHEMIA, DIFFUSE LEADS  PROLONGED QT INTERVAL  No STEMI  Compared to ECG 2021 13:34:52  Atrial fibrillation now present  Possible ischemia now present  Electronically Signed On 2021 10:45:09 PDT by Penelope Smith MD     EKG   Result Value Ref Range    Report       [facility]    Test Date:  2021  Pt Name:    DIOR NICHOLS    Department: ER  MRN:        [id number]                      Room:       RD 04  Gender:     Female                       Technician: [id number]  :        1950                   Requested By:ER TRIAGE PROTOCOL  Order #:    [id number]                    Reading MD: Penelope Smith  MD    Measurements  Intervals                                Axis  Rate:       57                           P:          -30  AL:         184                          QRS:        76  QRSD:       84                           T:          50  QT:         488  QTc:        476    Interpretive Statements  SINUS BRADYCARDIA  REPOL ABNRM SUGGESTS ISCHEMIA, LATERAL LEADS  No STEMI  Compared to ECG 08/11/2021 09:27:02  Atrial fibrillation no longer present  Ventricular premature complex(es) no longer present  Prolonged QT interval no longer present  Possible ischemia still present  Electronically Signed On 8-11 -2021 11:53:55 PDT by Penelope Smith MD             LABS  Personally reviewed by me  Labs Reviewed   CBC WITH DIFFERENTIAL - Abnormal; Notable for the following components:       Result Value    WBC 1.8 (*)     RBC 3.70 (*)     Hemoglobin 11.5 (*)     Hematocrit 34.7 (*)     MCHC 33.1 (*)     Platelet Count 139 (*)     Lymphocytes 20.00 (*)     Neutrophils (Absolute) 1.23 (*)     Lymphs (Absolute) 0.36 (*)     All other components within normal limits   COMP METABOLIC PANEL - Abnormal; Notable for the following components:    Sodium 132 (*)     Potassium 3.4 (*)     Chloride 93 (*)     Creatinine 4.51 (*)     Alkaline Phosphatase 102 (*)     All other components within normal limits   TROPONIN - Abnormal; Notable for the following components:    Troponin T 64 (*)     All other components within normal limits   PROBRAIN NATRIURETIC PEPTIDE, NT - Abnormal; Notable for the following components:    NT-proBNP 8305 (*)     All other components within normal limits   ESTIMATED GFR - Abnormal; Notable for the following components:    GFR If  12 (*)     GFR If Non  10 (*)     All other components within normal limits   MAGNESIUM   HEPATITIS PANEL ACUTE(4 COMPONENTS)   HEP B SURFACE AB   TROPONIN           RADIOLOGY  Personally reviewed by me  DX-CHEST-PORTABLE (1 VIEW)   Final Result      1.   Cardiomegaly   2.  Atherosclerosis   3.  Interstitial pulmonary edema, less likely atypical pneumonia          ED COURSE  Vitals:    08/11/21 1104 08/11/21 1134 08/11/21 1204 08/11/21 1234   BP:  (!) 168/66 152/74 (!) 167/72   Pulse: (!) 57 (!) 56 (!) 56 61   Resp: (!) 41 20 15 (!) 31   Temp:       TempSrc:       SpO2: 99% 97% 98% 100%   Weight:             Medications administered:  Medications - No data to display      Old records personally reviewed:  The patient had a cardiac catheter in 6/8/2021 showing no obstructed CAV, widely painted RCA stent, and normal EF. The patient followed up with Dr. Borjas (Cardiology).    9:38 AM Patient seen and examined at bedside. The patient presents with chest pain and A-fib. Ordered for DX-Chest-portable, , CMP, CBC with differential, Troponin, Magnesium, Estimated GFR to evaluate.       10:39 AM - Upon reassessment, patient is resting comfortably with normal vital signs.  No new complaints at this time.  Discussed results with patient and/or family as well as importance of primary care follow up.  Patient understands plan of care and strict return precautions for new or changing symptoms. Dr. Villar, Nephrology, at bedside. He recommends admission.       MEDICAL DECISION MAKING  Patient with history of coronary artery disease with stents in place as well as end-stage renal disease on dialysis who presents with acute onset of chest pain which occurred during dialysis today.  She was found to be in atrial fibrillation with RVR by EMS which is new for her.  She is tachycardic in the 130s to 140s on arrival with otherwise reassuring vital signs.  Her EKG shows atrial fibrillation with RVR.  Labs demonstrate an ongoing but slightly worsening neutropenia as well as a chronically elevated troponin.  She has no evidence of significant electrolyte abnormalities.  She appears euvolemic on my exam.  Chest x-ray does show interstitial edema however no signs of pneumonia, pneumothorax.   Patient has no symptoms concerning for COVID-19.    My initial plan was to treat the patient with IV beta-blockers for rate control however the patient did convert to sinus rhythm prior to any medications were administered.  Repeat EKG shows some lateral ST depressions and T wave inversions however these are not new.  The patient's states her chest pain did resolve after she converted to sinus rhythm.    12:00 PM I discussed the patient's case and the above findings with Dr. Simpson (Cardiology) who is recommending that the patient be started on apixaban for anticoagulant and stopping aspirin. He is also recommending discharge with close follow-up as long as troponin remains unchanged as she has had recent catheterization approximately 2 months ago    2:00 PM - Second troponin increased to 160 from 60 therefore the patient will need to be admitted for further monitoring and trending of troponins per Dr. Simpson's recommendations.  I discussed the case with Dr. Garg with [facility] who will accept admission of the patient.  The patient was updated on plan of care for admission and agreeable at this time.    CRITICAL CARE TIME  Upon my evaluation, this patient had a high probability of imminent or life-threatening deterioration due to A-fib with RVR which required my direct attention, intervention, and personal management.     I personally provided 36  minutes of total critical care time outside of time spent on separately billable/documented procedures. Time includes: review of laboratory data, review of radiology studies, discussion with consultants, discussion with family/patient, monitoring for potential decompensation.  Interventions were performed as documented above.       DISPOSITION:  Patient will be hospitalized by Dr. Garg ([facility])  in guarded condition.      IMPRESSION  (I48.91) Atrial fibrillation with RVR ([facility])  (N18.6) ESRD (end stage renal disease) ([facility])  (D70.9) Neutropenia, unspecified type ([facility])    Results,  diagnoses, and treatment options were discussed with the patient and/or family. Patient verbalized understanding of plan of care.    New Prescriptions    No medications on file            Dot BRONSON (Scribe), am scribing for, and in the presence of, Penelope Smith M.D..    Electronically signed by: Dot Navarrete (Scribe), 8/11/2021    Penelope BRONSON M.D. personally performed the services described in this documentation, as scribed by Dot Navarrete in my presence, and it is both accurate and complete. C.    The note accurately reflects work and decisions made by me.  Penelope Smith M.D.  8/11/2021  3:36 PM

## 2021-08-11 NOTE — CONSULTS
Nephrology Consultation    Date of Service  8/11/2021    Referring Physician  Penelope Smith M.D.    Consulting Physician  Priyank Villar M.D.    Reason for Consultation  Management of ESRD on HD      History of Presenting Illness  70 y.o. female with pre-diabetes, hypertension, end-stage kidney disease on dialysis Monday Wednesday Friday who presented 8/11/2021 with chest pain and palpitations.    The patient was in her usual state of health, she attended dialysis this morning at [facility].  She is well-known to me from the [facility] clinic.  About an hour into her dialysis treatment, she started experiencing palpitations, chest pain, and pain in her left arm.  She continued dialysis until she had about half hour remaining, but then got her treatment short, and was transported to the emergency room.  She was noted to have a rapid heartbeat, and was irregular.  The patient denies any known history of atrial fibrillation.    The patient is anuric.  She has been on dialysis since 2014.  Of note, she is listed for transplant at [facility], but transplant currently on hold due to history of leukopenia, and concern for induction immunosuppression causing worsening leukopenia.  The patient dialyzes via a left upper arm AV fistula, which works well for her.    Review of Systems  Review of Systems   Constitutional: Negative for fever.   Respiratory: Negative for shortness of breath.    Cardiovascular: Positive for chest pain and palpitations.   Gastrointestinal: Negative for abdominal pain.   All other systems reviewed and are negative.      Past Medical History  Past Medical History:   Diagnosis Date   • Acquired hypothyroidism 5/4/2020   • CAD (coronary artery disease)    • Chronic diastolic heart failure (HCC) 5/4/2020   • Coronary artery disease due to lipid rich plaque     2 Synergy NOEMI to 100% RCA stent placed   • Dental disorder     partial dentures- uppers   • Diabetes (HCC)     oral  medication   • ESRD (end stage renal disease) on dialysis ([facility]) 5/4/2020   • Hemodialysis patient ([facility])     M, W, F   • Hyperlipidemia    • Hypertension    • Kidney transplant candidate    • Presence of drug-eluting stent in right coronary artery    • QT prolongation 1/22/2020   • RLS (restless legs syndrome) 8/5/2016   • Transaminitis 12/22/2018       Surgical History  Past Surgical History:   Procedure Laterality Date   • ZZZ CARDIAC CATH  9/7/2016    RCA stented with 2 Synergy drug-eluting stents.   • RECOVERY  8/16/2016    Procedure: CATH LAB [facility] WITH POSSIBLE DR. CASTILLO;  Surgeon: Recoveryonly Surgery;  Location: SURGERY PRE-POST PROC UNIT [facility];  Service:    • Z CARDIAC CATH  8/16/16    100% RCA   • OTHER Left 2014    left arm upper extremity fistula   • OTHER ABDOMINAL SURGERY      left kidney removed due to cancer       Family History  Family History   Problem Relation Age of Onset   • Diabetes Sister    • Other Sister         liver disease   • Diabetes Brother    • Heart Disease Neg Hx        Social History  Social History     Socioeconomic History   • Marital status:      Spouse name: Not on file   • Number of children: Not on file   • Years of education: Not on file   • Highest education level: Not on file   Occupational History   • Not on file   Tobacco Use   • Smoking status: Never Smoker   • Smokeless tobacco: Never Used   Vaping Use   • Vaping Use: Never used   Substance and Sexual Activity   • Alcohol use: No     Alcohol/week: 0.0 oz   • Drug use: No   • Sexual activity: Never   Other Topics Concern   • Not on file   Social History Narrative   • Not on file     Social Determinants of Health     Financial Resource Strain:    • Difficulty of Paying Living Expenses:    Food Insecurity:    • Worried About Running Out of Food in the Last Year:    • Ran Out of Food in the Last Year:    Transportation Needs:    • Lack of Transportation (Medical):    • Lack of Transportation (Non-Medical):     Physical Activity:    • Days of Exercise per Week:    • Minutes of Exercise per Session:    Stress:    • Feeling of Stress :    Social Connections:    • Frequency of Communication with Friends and Family:    • Frequency of Social Gatherings with Friends and Family:    • Attends [employer] Services:    • Active Member of Clubs or Organizations:    • Attends Club or Organization Meetings:    • Marital Status:    Intimate Partner Violence:    • Fear of Current or Ex-Partner:    • Emotionally Abused:    • Physically Abused:    • Sexually Abused:        Medications  No current facility-administered medications for this encounter.     Current Outpatient Medications   Medication Sig Dispense Refill   • furosemide (LASIX) 80 MG Tab Take 80 mg by mouth in the morning, at noon, and at bedtime.     • gabapentin (NEURONTIN) 300 MG Cap Take 600 mg by mouth at bedtime.     • methocarbamol (ROBAXIN) 500 MG Tab Take 1 tablet by mouth 3 times a day as needed (Leg cramping). 30 tablet 0   • ondansetron (ZOFRAN ODT) 4 MG TABLET DISPERSIBLE Take 1 tablet by mouth every 8 hours as needed. 10 tablet 0   • ibuprofen (MOTRIN) 800 MG Tab Take 1 tablet by mouth every 8 hours as needed. 30 tablet 11   • ASPIRIN LOW DOSE 81 MG Chew Tab chewable tablet TOME FERNANDO TABLETA TODOS LOS MCCORMICK NOT COVERED OTC (Patient taking differently: Chew 81 mg every day.) 90 tablet 3   • carvedilol (COREG) 25 MG Tab TOME FERNANDO TABLETA DOS VECES AL DESMOND CON LAS COMIDAS (Patient taking differently: Take 25 mg by mouth 2 times a day with meals.) 180 tablet 3   • ROPINIRole (REQUIP) 1 MG Tab TOME FERNANDO TABLETA DOS VECES AL DESMOND (Patient taking differently: Take 1 mg by mouth 2 times a day.) 180 tablet 1   • sevelamer carbonate (RENVELA) 800 MG Tab tablet Take 1 tablet by mouth 3 times a day with meals. 270 tablet 3   • lisinopril (PRINIVIL) 40 MG tablet Take 1 tablet by mouth every day. 90 tablet 3   • hydrALAZINE (APRESOLINE) 100 MG tablet Take 1 tablet by mouth 2 times a  day. 180 tablet 3   • pioglitazone (ACTOS) 30 MG Tab Take 1 Tab by mouth every day. 90 Tab 3   • levothyroxine (SYNTHROID) 50 MCG Tab Take 1 Tab by mouth Every morning on an empty stomach. 90 Tab 3       Allergies  No Known Allergies    Physical Exam  Temp:  [36.4 °C (97.5 °F)] 36.4 °C (97.5 °F)  Pulse:  [] 62  Resp:  [7-55] 29  BP: (122-168)/() 141/68  SpO2:  [92 %-100 %] 99 %    Physical Exam  Constitutional:       General: She is not in acute distress.     Appearance: She is well-developed.   HENT:      Head: Normocephalic and atraumatic.      Mouth/Throat:      Mouth: Mucous membranes are moist.      Pharynx: Oropharynx is clear. No oropharyngeal exudate.   Eyes:      General: No scleral icterus.     Extraocular Movements: Extraocular movements intact.   Neck:      Trachea: No tracheal deviation.   Cardiovascular:      Rate and Rhythm: Tachycardia present. Rhythm irregular.      Heart sounds: Normal heart sounds. No murmur heard.     Pulmonary:      Effort: Pulmonary effort is normal.      Breath sounds: No stridor. No rales.   Abdominal:      Palpations: Abdomen is soft.      Tenderness: There is no abdominal tenderness.   Musculoskeletal:         General: Normal range of motion.      Cervical back: Normal range of motion. No rigidity.      Right lower leg: No edema.      Left lower leg: No edema.   Skin:     General: Skin is warm and dry.   Neurological:      General: No focal deficit present.      Mental Status: She is alert and oriented to person, place, and time.   Psychiatric:         Mood and Affect: Mood normal.         Behavior: Behavior normal.     Access: Left brachiocephalic AV fistula, with patent bruit and thrill      Fluids      Laboratory  Labs reviewed, pertinent labs below.  Recent Labs     08/09/21  1325 08/11/21  0934   WBC 2.4* 1.8*   RBC 3.69* 3.70*   HEMOGLOBIN 11.5* 11.5*   HEMATOCRIT 35.4* 34.7*   MCV 95.9 93.8   MCH 31.2 31.1   MCHC 32.5* 33.1*   RDW 49.3 46.6   PLATELETCT  142* 139*   MPV 10.6 10.6     Recent Labs     08/09/21  1325 08/11/21  0934   SODIUM 126* 132*   POTASSIUM 4.6 3.4*   CHLORIDE 92* 93*   CO2 20 24   GLUCOSE 102* 94   BUN 22 21   CREATININE 4.95* 4.51*   CALCIUM 9.8 8.9                URINALYSIS:  No results found for: COLORURINE, CLARITY, SPECGRAVITY, PHURINE, KETONES, PROTEINURIN, BILIRUBINUR, UROBILU, NITRITE, LEUKESTERAS, OCCULTBLOOD  UPC  No results found for: TOTPROTUR No results found for: CREATININEU    Imaging interpreted by radiologist. Imaging reports reviewed with pertinent findings below  DX-CHEST-PORTABLE (1 VIEW)   Final Result      1.  Cardiomegaly   2.  Atherosclerosis   3.  Interstitial pulmonary edema, less likely atypical pneumonia            Assessment/Plan  70 y.o. female with pre-diabetes, hypertension, end-stage kidney disease on dialysis Monday Wednesday Friday who presented 8/11/2021 with chest pain and palpitations.    1.  ESRD on hemodialysis Monday Wednesday Friday.  Anuric.  There is no acute need for dialysis today.  Likely plan on resuming Monday Wednesday Friday dialysis schedule.    2.  Access:  left brachiocephalic AV fistula.  Beatty left arm precautions.    3.  New onset atrial fibrillation with RVR, reason for admission.  Agree with cardiology consultation.  As the patient is less than 80 years old, and weight is greater than 60 kg, she might be a candidate for regular strength apixaban 5 mg p.o. twice daily.    4.  Leukopenia, noted going back years.  Recommend hematology oncology consultation as this is holding of her full listing for transplantation.    5.  Anemia of chronic disease.  Continue low-dose Epogen thrice weekly with dialysis.    6.  Thrombocytopenia, noted on admission.  Mild.  Unclear etiology.  Check CBC daily.    7.  Hyponatremia, noted on admission.  In this anuric patient, this is due to excess water intake.  Restrict fluids to 1 L daily.  Check labs daily.    Following      Priyank Villar,  MD  Nephrology

## 2021-08-11 NOTE — CONSULTS
Cardiology Initial Consult Note    Date of note:    8/11/2021      Consulting Physician: Penelope Smith M.D.    Patient ID:    Name:   Tere Vargas     YOB: 1950  Age:   70 y.o.  female   MRN:   [id number]      Reason for Consultation: chest pain    HPI:  Tere Gallegos is a 70 y.o.-year-old female with a history of ESRD on HD, diabetes, CAD s/p RCA stent, hypertension, dyslipidemia who presented with chest pain.    She reported this morning during dialysis she had acute onset of moderate severity substernal chest pain radiating down her right left arm. This resolved at the ED without intervention. She denies associated nausea, diaphoresis. She did have palpitations.     EKG on admission showed a fib from which she self converted without intervention as well.     Of note, she had an echocardiogram in June of 2021 which showed normal LVEF and no WMA.  She had a cath then as well which showed open stents in the RCA and no obstructive CAD. Also, her PCP stopped her statin per her report a couple months ago.         ROS  Constitution: Negative for chills, fever and night sweats.   HENT: Negative for nosebleeds.    Eyes: Negative for vision loss in left eye and vision loss in right eye.   Respiratory: Negative for hemoptysis.    Gastrointestinal: Negative for hematemesis, hematochezia and melena.   Genitourinary: Negative for hematuria.   Neurological: Negative for focal weakness, numbness and paresthesias.    All others reviewed and negative.      Past Medical History:   Diagnosis Date   • Acquired hypothyroidism 5/4/2020   • CAD (coronary artery disease)    • Chronic diastolic heart failure (HCC) 5/4/2020   • Coronary artery disease due to lipid rich plaque     2 Synergy NOEMI to 100% RCA stent placed   • Dental disorder     partial dentures- uppers   • Diabetes ([facility])     oral medication   • ESRD (end stage renal disease) on dialysis ([facility]) 5/4/2020   • Hemodialysis patient (Formerly [facility])      M, W, F   • Hyperlipidemia    • Hypertension    • Kidney transplant candidate    • Presence of drug-eluting stent in right coronary artery    • QT prolongation 1/22/2020   • RLS (restless legs syndrome) 8/5/2016   • Transaminitis 12/22/2018       Past Surgical History:   Procedure Laterality Date   • [facility] CARDIAC CATH  9/7/2016    RCA stented with 2 Synergy drug-eluting stents.   • RECOVERY  8/16/2016    Procedure: CATH LAB [facility] WITH POSSIBLE DR. CASTILLO;  Surgeon: Recoveryonly Surgery;  Location: SURGERY PRE-POST PROC UNIT [facility];  Service:    • [facility] CARDIAC CATH  8/16/16    100% RCA   • OTHER Left 2014    left arm upper extremity fistula   • OTHER ABDOMINAL SURGERY      left kidney removed due to cancer       Current Outpatient Medications   Medication Sig Dispense Refill   • furosemide (LASIX) 80 MG Tab Take 80 mg by mouth in the morning, at noon, and at bedtime.     • gabapentin (NEURONTIN) 300 MG Cap Take 600 mg by mouth at bedtime.     • methocarbamol (ROBAXIN) 500 MG Tab Take 1 tablet by mouth 3 times a day as needed (Leg cramping). 30 tablet 0   • ondansetron (ZOFRAN ODT) 4 MG TABLET DISPERSIBLE Take 1 tablet by mouth every 8 hours as needed. 10 tablet 0   • ibuprofen (MOTRIN) 800 MG Tab Take 1 tablet by mouth every 8 hours as needed. 30 tablet 11   • ASPIRIN LOW DOSE 81 MG Chew Tab chewable tablet TOME FERNANDO TABLETA TODOS LOS MCCORMICK NOT COVERED OTC (Patient taking differently: Chew 81 mg every day.) 90 tablet 3   • carvedilol (COREG) 25 MG Tab TOME FERNANDO TABLETA DOS VECES AL DESMOND CON LAS COMIDAS (Patient taking differently: Take 25 mg by mouth 2 times a day with meals.) 180 tablet 3   • ROPINIRole (REQUIP) 1 MG Tab TOME FERNANDO TABLETA DOS VECES AL EDSMOND (Patient taking differently: Take 1 mg by mouth 2 times a day.) 180 tablet 1   • sevelamer carbonate (RENVELA) 800 MG Tab tablet Take 1 tablet by mouth 3 times a day with meals. 270 tablet 3   • lisinopril (PRINIVIL) 40 MG tablet Take 1 tablet by mouth every day. 90  tablet 3   • hydrALAZINE (APRESOLINE) 100 MG tablet Take 1 tablet by mouth 2 times a day. 180 tablet 3   • pioglitazone (ACTOS) 30 MG Tab Take 1 Tab by mouth every day. 90 Tab 3   • levothyroxine (SYNTHROID) 50 MCG Tab Take 1 Tab by mouth Every morning on an empty stomach. 90 Tab 3         No Known Allergies      Family History   Problem Relation Age of Onset   • Diabetes Sister    • Other Sister         liver disease   • Diabetes Brother    • Heart Disease Neg Hx          Social History     Socioeconomic History   • Marital status:      Spouse name: Not on file   • Number of children: Not on file   • Years of education: Not on file   • Highest education level: Not on file   Occupational History   • Not on file   Tobacco Use   • Smoking status: Never Smoker   • Smokeless tobacco: Never Used   Vaping Use   • Vaping Use: Never used   Substance and Sexual Activity   • Alcohol use: No     Alcohol/week: 0.0 oz   • Drug use: No   • Sexual activity: Never   Other Topics Concern   • Not on file   Social History Narrative   • Not on file     Social Determinants of Health     Financial Resource Strain:    • Difficulty of Paying Living Expenses:    Food Insecurity:    • Worried About Running Out of Food in the Last Year:    • Ran Out of Food in the Last Year:    Transportation Needs:    • Lack of Transportation (Medical):    • Lack of Transportation (Non-Medical):    Physical Activity:    • Days of Exercise per Week:    • Minutes of Exercise per Session:    Stress:    • Feeling of Stress :    Social Connections:    • Frequency of Communication with Friends and Family:    • Frequency of Social Gatherings with Friends and Family:    • Attends [employer] Services:    • Active Member of Clubs or Organizations:    • Attends Club or Organization Meetings:    • Marital Status:    Intimate Partner Violence:    • Fear of Current or Ex-Partner:    • Emotionally Abused:    • Physically Abused:    • Sexually Abused:           Physical Exam  Body mass index is 26.34 kg/m².  BP (!) 165/73   Pulse 60   Temp 36.4 °C (97.5 °F) (Temporal)   Resp (!) 21   Wt 65.3 kg (144 lb)   SpO2 99%   Vitals:    08/11/21 1134 08/11/21 1204 08/11/21 1234 08/11/21 1312   BP: (!) 168/66 152/74 (!) 167/72 (!) 165/73   Pulse: (!) 56 (!) 56 61 60   Resp: 20 15 (!) 31 (!) 21   Temp:       TempSrc:       SpO2: 97% 98% 100% 99%   Weight:         Oxygen Therapy:  Pulse Oximetry: 99 %, O2 (LPM): 2, O2 Delivery Device: Nasal Cannula    General: No apparent distress  Eyes: nl conjunctiva  ENT: OP covered by mask.   Neck: JVP 4 cm H2O  Lungs: normal respiratory effort, CTAB  Heart: RRR, 3/6 systolic murmur, no rubs or gallops, no edema bilateral lower extremities. No LV/RV heave on cardiac palpatation. 2+ right radial pulse.   Abdomen: soft, non tender, non distended, no masses, normal bowel sounds.  No HSM.  Extremities/MSK: no clubbing, no cyanosis. Well functioning left upper arm fistula noted.   Neurological: No focal sensory deficits  Psychiatric: Appropriate affect, A/O x 3  Skin: Warm extremities        Labs (personally reviewed and notable for):   Trop 165      Cardiac Imaging and Procedures Review:    EKG dated 8/11/2021: My personal interpretation is atrial fibrillation, lateral and inferior ST segment depression.     Echo dated 6/4/2021:  CONCLUSIONS  Normal left ventricular systolic function.  Left ventricular ejection fraction is visually estimated to be 70-75%.  Mild concentric left ventricular hypertrophy.  Normal right ventricular size and systolic function.  No valve abnormalities.          Echo 1/23/20:  CONCLUSIONS  Compared to the images of the study done 12/23/18, RVSP previously   severely elevated, could not be assessed on the study, otherwise no   significant change.  Left ventricular ejection fraction is visually estimated to be 60%.  Mild concentric left ventricular hypertrophy.  Grade II diastolic dysfunction.  Moderately dilated  left atrium.  Mild mitral regurgitation.  Unable to estimate pulmonary artery pressure due to an inadequate   tricuspid regurgitant jet.  Trivial/physiologic pericardial fluid.     Stress Test 10/20/20:  Report   NUCLEAR IMAGING INTERPRETATION   Normal left ventricular perfusion with no fixed or reversible defects.    Normal left ventricular wall motion, with EF of 68%.    ECG INTERPRETATION   Negative stress ECG for ischemia.     [facility] 9/7/2016:  1.  LV was not entered.  Blood pressure was as mentioned above quite elevated   At the beginning of the case.  We did give her several doses of intracoronary   nitroglycerin throughout the case.  Blood pressures towards the end of the   case were in the 120-130 systolic.     2.  Single-vessel coronary artery disease.       The left main is a large caliber vessel that is angiographically free of disease.   It gave off a large left anterior descending artery and codominant left circumflex artery.   Left anterior descending artery is around 3 mm in diameter proximally.  It     gave off 2 medium-sized diagonal branches in proximal and mid segment.  It   has minor irregularity, but no significant disease seen.  Antegrade flow was   normal.  Left anterior descending artery provides some collaterals through   septal arcade and the posterior descending artery.       Left circumflex artery is a codominant system, is quite large, probably   about 4 mm in diameter proximally.  It gives rise to 2 or 3 large obtuse   marginal branches in the mid segment and several small medium-sized obtuse   marginal branches distally.  There is no significant disease in the left   circumflex artery or its major branches.     The right coronary artery is a medium sized vessel around 2.5 mm in diameter.    Proximally it is occluded after it give off a conus branch about 2 cm or so   from its origin with small bridging collateral and probable a micro channel into a   diffusely diseased [id number] right  coronary artery and posterior   descending artery.  There was no significant PLV originating from the right   coronary artery.     After intervention, the stented segment in mid right coronary artery has no residual   stenosis with IRIS-3 antegrade flow into moderately diseased distal right   coronary artery and posterior descending artery.     PostOp Diagnosis: s/p NOEMI 2.5 and 2.25 mm Synergy stents to mid RCA with 0% residual in stented segment, IRIS III    [facility] (6/8/2021):   FINDINGS:  I. HEMODYNAMICS:               Ao: 110/54 mmHg              LEDP: 22 mmHg              Gradient on LV pullback: No     II. LEFT VENTRICULOGRAM:              LVEF JOHNSON PROJECTION: 70%                III. CORONARY ANGIOGRAPHY:  Left Main: Large caliber bifurcating no CAD.  Left Anterior Descending: Moderate to large caliber vessel with usual complement of diagonals.  Mild nonobstructive CAD.  Left Circumflex: Large caliber Co-dominant supplying multiple large tortuous obtuse marginals with mild nonobstructive CAD.  Right Coronary: Small caliber supplying a moderate-sized RPDA.  This is a codominant fashion.  There is a widely patent long segment of previously placed stents in the midportion.     COMPLICATIONS: none apparent     RECOMMENDATIONS:  Proceed with renal transplant evaluation.    Radiology test Review:  CXR:   FINDINGS:           HEART: Enlarged. There is atherosclerotic calcification in the aortic arch.  LUNGS: There are areas of peripheral interstitial prominence. Lung volumes are low.  PLEURA: No effusion or pneumothorax.      Impression and Medical Decision Making:  # Elevated troponin, query NSTEMI vs demand ischemia from atrial fibrillation with RVR.   # Paroxysmal atrial fibrillation  # CAD s/p RCA stent  # ESRD on HD, pending renal transplant evaluation at [facility]  # Hypertension  # Dyslipidemia    Recommendations:  # I have reached out to [facility] to inform them of her admission. I suspect  her symptoms are from demand ischemia and we can treat her medically, however I will see if they would require a repeat cardiac catheterization to continue to proceed with renal transplant evaluation  # start heparin gtt for atrial fibrillation as well as to cover for ACS. Transition to NOAC prior to DC.   # restart statin, lipitor 80mg PO Daily  # continue coreg 25mg PO bid  # continue lasix, appears euvolemic  # start norvasc for added BP control  # continue home hydralazine and lisinopril  # ntg prn, can start a long acting nitrate if recurrent pain.   # reasonable to trend one more trop and EKG  # admit obs overnight.     Discussed with primary team.       Thank you for allowing me to participate in the care of this patient, I will continue to follow.  Please contact me with any questions.      Mark Simpson MD  Cardiologist, [facility] [place]   [phone]

## 2021-08-11 NOTE — SENIOR ADMIT NOTE
SENIOR ADMIT NOTE:    Agnes Garg M.D.  Date & Time note created:    8/11/2021   3:04 PM       Chief Complaint:  New onset Afib RVR    History of Present Illness:    70 y.o F w/ pmhx pre-diabetes, HTN, HLD, ESRD on dialysis MWF, renal cell carcinoma s/p left nephrectom, CAD s/p RCA stent BIBA w/ c/o chest pain while at her dialysis session today. Patient received aspirin, fentanyl, nitro and zofran in the ambulance. On presentation to the ED, the patient was found to be in Afib w/ RVR up to rate in 140s,  Afib spontaneously converted without intervention. Initial troponin was at 64 which has been baseline for her. Repeat Troponin elevated up to 164. Initial EKG shows Afib w/ rate of 124, with lateral and inferior ST depression. Repeat EKG after spontaneous conversion shows sinus bradycardia and still persistent ST depression in lateral leads. Patient recently underwent left heart cath as part of her transplant evaluation in 6/21, no obstructing stenosis found. EDP consulted Dr. Simpson, cardiology, who recommended anticoagulation and trending troponin. Also consulted Dr. Villar who recommended normal dialysis schedule.     Patient reports sudden onset of chest pain, associated with palpitations, without nausea or vomitting. Denies previous history of palpitations. Denies recent fever, chills or diarrhea. Reports fatique and generalized myalgias, however, chronic and with exertion. Denies any orthopnea, PND, peripheral swelling. Reports she has been compliant with her fluid restrictions outpatient. Reports she has only completed 1.5 hours of her dialysis today but denies any SOB.    Physical Exam:  Weight/BMI: Body mass index is 26.34 kg/m².  /68   Pulse 62   Temp 36.4 °C (97.5 °F) (Temporal)   Resp (!) 29   Wt 65.3 kg (144 lb)   SpO2 99%   Vitals:    08/11/21 1204 08/11/21 1234 08/11/21 1312 08/11/21 1402   BP: 152/74 (!) 167/72 (!) 165/73 141/68   Pulse: (!) 56 61 60 62   Resp: 15 (!) 31 (!) 21 (!) 29    Temp:       TempSrc:       SpO2: 98% 100% 99% 99%   Weight:         Oxygen Therapy:  Pulse Oximetry: 99 %, O2 (LPM): 2, O2 Delivery Device: Nasal Cannula    Physical exam:  Constitutional:  No acute distress. A&O x3, on 2L N/C   HENMT:  Normocephalic, Atraumatic, Mallampati 4  Eyes:  PERRLA, EOMI, Conjunctiva normal  Neck:  Thick, Supple, Full range of motion, No stridor  Cardiovascular:  Regular rate and rhythm, No murmurs, No rubs, No gallops.   Lungs: Respiratory effort is normal, no crackles, no wheezing.  Extremities: Good pedal pulses b/l, no edema, 5/5 strength.  Abdomen: Bowel sounds x4, Soft, Non-tender, Non-distended, No guarding, No rebound, No masses.  Neurologic: Good sensations, Cranial nerves II through XII grossly intact. No focal deficits noted.    Imaging  DX-CHEST-PORTABLE (1 VIEW)   Final Result      1.  Cardiomegaly   2.  Atherosclerosis   3.  Interstitial pulmonary edema, less likely atypical pneumonia            ASSESSMENT/PLAN:  #pAfib, new  #Elevated Troponin   #ESRD on dialysis MWF  #Transplant candidate  #Leukopenia  #HTN    - new onset Afib, spontaneously converted back to SR  - elevated troponin likely secondary to Afib causing demand ischemia. Has risk factors for KANDY. No signs/symptoms suggestive of infections although with leukopenia, possibly immunosuppressed    - EDP discussed with cardiology Dr. Simpson. Recommends starting on Heparin as it would also cover ACS. Dr. Simpson also contacted [facility] to see if repeat catetherization is required for her transplant evaluation so Heparin would also be appropriate.   - recheck TSH  - repeat CBC in AM, if WBC stable, outpatient monitoring. Avoid acetaminophen to not mask neutropenic fever   - restarting home meds for bp control. Hydralazine PRN bp >170  - admit to telemetry for monitor, trend Troponin q8H, will repeat EKG if symptomatic or continue to rise.   - plan to convert to Apixaban for anticoagulation on D/C, per Nephrology, can  anticoagulate with full dose 5mg BID     For full details please refer to H&P done by Dr. Eduard Garg M.D.

## 2021-08-11 NOTE — ED NOTES
Med rec complete per pt and MD notes from recent visit on 8/3/21- under encounters.       Medication Sig   • furosemide (LASIX) 80 MG Tab Take 80 mg by mouth in the morning, at noon, and at bedtime.   • gabapentin (NEURONTIN) 300 MG Cap Take 600 mg by mouth at bedtime.   • methocarbamol (ROBAXIN) 500 MG Tab Take 1 tablet by mouth 3 times a day as needed (Leg cramping).   • ondansetron (ZOFRAN ODT) 4 MG TABLET DISPERSIBLE Take 1 tablet by mouth every 8 hours as needed.   • ibuprofen (MOTRIN) 800 MG Tab Take 1 tablet by mouth every 8 hours as needed.   • ASPIRIN LOW DOSE 81 MG Chew Tab chewable tablet Chew 81 mg every day   • carvedilol (COREG) 25 MG Tab Take 25 mg by mouth 2 times a day with meals   • ROPINIRole (REQUIP) 1 MG Tab  Take 1 mg by mouth 2 times a day   • sevelamer carbonate (RENVELA) 800 MG Tab tablet Take 1 tablet by mouth 3 times a day with meals.   • lisinopril (PRINIVIL) 40 MG tablet Take 1 tablet by mouth every day.   • hydrALAZINE (APRESOLINE) 100 MG tablet Take 1 tablet by mouth 2 times a day.   • pioglitazone (ACTOS) 30 MG Tab Take 1 Tab by mouth every day.   • levothyroxine (SYNTHROID) 50 MCG Tab Take 1 Tab by mouth Every morning on an empty stomach.

## 2021-08-12 ENCOUNTER — APPOINTMENT (OUTPATIENT)
Dept: CARDIOLOGY | Facility: MEDICAL CENTER | Age: 71
DRG: 308 | End: 2021-08-12
Attending: NURSE PRACTITIONER
Payer: MEDICARE

## 2021-08-12 LAB
ANION GAP SERPL CALC-SCNC: 16 MMOL/L (ref 7–16)
BASOPHILS # BLD AUTO: 0.4 % (ref 0–1.8)
BASOPHILS # BLD: 0.01 K/UL (ref 0–0.12)
BUN SERPL-MCNC: 35 MG/DL (ref 8–22)
CALCIUM SERPL-MCNC: 8.8 MG/DL (ref 8.5–10.5)
CHLORIDE SERPL-SCNC: 93 MMOL/L (ref 96–112)
CO2 SERPL-SCNC: 23 MMOL/L (ref 20–33)
CREAT SERPL-MCNC: 6.72 MG/DL (ref 0.5–1.4)
EKG IMPRESSION: NORMAL
EOSINOPHIL # BLD AUTO: 0.04 K/UL (ref 0–0.51)
EOSINOPHIL NFR BLD: 1.6 % (ref 0–6.9)
ERYTHROCYTE [DISTWIDTH] IN BLOOD BY AUTOMATED COUNT: 48.5 FL (ref 35.9–50)
GLUCOSE SERPL-MCNC: 80 MG/DL (ref 65–99)
HCT VFR BLD AUTO: 31.3 % (ref 37–47)
HGB BLD-MCNC: 10.5 G/DL (ref 12–16)
HGB RETIC QN AUTO: 32.1 PG/CELL (ref 29–35)
IMM GRANULOCYTES # BLD AUTO: 0.01 K/UL (ref 0–0.11)
IMM GRANULOCYTES NFR BLD AUTO: 0.4 % (ref 0–0.9)
IMM RETICS NFR: 28.7 % (ref 9.3–17.4)
LYMPHOCYTES # BLD AUTO: 0.56 K/UL (ref 1–4.8)
LYMPHOCYTES NFR BLD: 21.9 % (ref 22–41)
MCH RBC QN AUTO: 31.8 PG (ref 27–33)
MCHC RBC AUTO-ENTMCNC: 33.5 G/DL (ref 33.6–35)
MCV RBC AUTO: 94.8 FL (ref 81.4–97.8)
MONOCYTES # BLD AUTO: 0.31 K/UL (ref 0–0.85)
MONOCYTES NFR BLD AUTO: 12.1 % (ref 0–13.4)
NEUTROPHILS # BLD AUTO: 1.63 K/UL (ref 2–7.15)
NEUTROPHILS NFR BLD: 63.6 % (ref 44–72)
NRBC # BLD AUTO: 0 K/UL
NRBC BLD-RTO: 0 /100 WBC
PHOSPHATE SERPL-MCNC: 5.9 MG/DL (ref 2.5–4.5)
PLATELET # BLD AUTO: 127 K/UL (ref 164–446)
PMV BLD AUTO: 11.3 FL (ref 9–12.9)
POTASSIUM SERPL-SCNC: 4.6 MMOL/L (ref 3.6–5.5)
RBC # BLD AUTO: 3.3 M/UL (ref 4.2–5.4)
RETICS # AUTO: 0.05 M/UL (ref 0.04–0.06)
RETICS/RBC NFR: 1.6 % (ref 0.8–2.1)
SODIUM SERPL-SCNC: 132 MMOL/L (ref 135–145)
TROPONIN T SERPL-MCNC: 395 NG/L (ref 6–19)
TROPONIN T SERPL-MCNC: 437 NG/L (ref 6–19)
TSH SERPL DL<=0.005 MIU/L-ACNC: 4.15 UIU/ML (ref 0.38–5.33)
UFH PPP CHRO-ACNC: 0.33 IU/ML
UFH PPP CHRO-ACNC: 0.38 IU/ML
WBC # BLD AUTO: 2.6 K/UL (ref 4.8–10.8)

## 2021-08-12 PROCEDURE — 99233 SBSQ HOSP IP/OBS HIGH 50: CPT | Performed by: INTERNAL MEDICINE

## 2021-08-12 PROCEDURE — 84100 ASSAY OF PHOSPHORUS: CPT

## 2021-08-12 PROCEDURE — 85025 COMPLETE CBC W/AUTO DIFF WBC: CPT

## 2021-08-12 PROCEDURE — A9270 NON-COVERED ITEM OR SERVICE: HCPCS | Performed by: NURSE PRACTITIONER

## 2021-08-12 PROCEDURE — 85520 HEPARIN ASSAY: CPT

## 2021-08-12 PROCEDURE — 700102 HCHG RX REV CODE 250 W/ 637 OVERRIDE(OP): Performed by: STUDENT IN AN ORGANIZED HEALTH CARE EDUCATION/TRAINING PROGRAM

## 2021-08-12 PROCEDURE — 700102 HCHG RX REV CODE 250 W/ 637 OVERRIDE(OP): Performed by: NURSE PRACTITIONER

## 2021-08-12 PROCEDURE — 80048 BASIC METABOLIC PNL TOTAL CA: CPT

## 2021-08-12 PROCEDURE — 99232 SBSQ HOSP IP/OBS MODERATE 35: CPT | Mod: GC | Performed by: INTERNAL MEDICINE

## 2021-08-12 PROCEDURE — 93325 DOPPLER ECHO COLOR FLOW MAPG: CPT

## 2021-08-12 PROCEDURE — 96366 THER/PROPH/DIAG IV INF ADDON: CPT

## 2021-08-12 PROCEDURE — 770020 HCHG ROOM/CARE - TELE (206)

## 2021-08-12 PROCEDURE — 93010 ELECTROCARDIOGRAM REPORT: CPT | Performed by: INTERNAL MEDICINE

## 2021-08-12 PROCEDURE — 36415 COLL VENOUS BLD VENIPUNCTURE: CPT

## 2021-08-12 PROCEDURE — 84484 ASSAY OF TROPONIN QUANT: CPT

## 2021-08-12 PROCEDURE — 85046 RETICYTE/HGB CONCENTRATE: CPT

## 2021-08-12 PROCEDURE — 84443 ASSAY THYROID STIM HORMONE: CPT

## 2021-08-12 PROCEDURE — A9270 NON-COVERED ITEM OR SERVICE: HCPCS | Performed by: STUDENT IN AN ORGANIZED HEALTH CARE EDUCATION/TRAINING PROGRAM

## 2021-08-12 PROCEDURE — 93005 ELECTROCARDIOGRAM TRACING: CPT | Performed by: STUDENT IN AN ORGANIZED HEALTH CARE EDUCATION/TRAINING PROGRAM

## 2021-08-12 RX ORDER — CARVEDILOL 25 MG/1
25 TABLET ORAL 2 TIMES DAILY WITH MEALS
Status: DISCONTINUED | OUTPATIENT
Start: 2021-08-12 | End: 2021-08-13 | Stop reason: HOSPADM

## 2021-08-12 RX ORDER — CLOPIDOGREL BISULFATE 75 MG/1
300 TABLET ORAL ONCE
Status: COMPLETED | OUTPATIENT
Start: 2021-08-12 | End: 2021-08-12

## 2021-08-12 RX ORDER — CLOPIDOGREL BISULFATE 75 MG/1
75 TABLET ORAL DAILY
Status: DISCONTINUED | OUTPATIENT
Start: 2021-08-13 | End: 2021-08-13 | Stop reason: HOSPADM

## 2021-08-12 RX ADMIN — DOCUSATE SODIUM 50 MG AND SENNOSIDES 8.6 MG 2 TABLET: 8.6; 5 TABLET, FILM COATED ORAL at 17:29

## 2021-08-12 RX ADMIN — CLOPIDOGREL BISULFATE 300 MG: 75 TABLET ORAL at 14:14

## 2021-08-12 RX ADMIN — HYDRALAZINE HYDROCHLORIDE 100 MG: 50 TABLET, FILM COATED ORAL at 05:53

## 2021-08-12 RX ADMIN — FUROSEMIDE 80 MG: 40 TABLET ORAL at 20:35

## 2021-08-12 RX ADMIN — FUROSEMIDE 80 MG: 40 TABLET ORAL at 14:14

## 2021-08-12 RX ADMIN — APIXABAN 2.5 MG: 2.5 TABLET, FILM COATED ORAL at 17:28

## 2021-08-12 RX ADMIN — ATORVASTATIN CALCIUM 80 MG: 80 TABLET, FILM COATED ORAL at 17:28

## 2021-08-12 RX ADMIN — PIOGLITAZONE 30 MG: 30 TABLET ORAL at 07:54

## 2021-08-12 RX ADMIN — ROPINIROLE HYDROCHLORIDE 1 MG: 0.5 TABLET, FILM COATED ORAL at 05:53

## 2021-08-12 RX ADMIN — GABAPENTIN 600 MG: 300 CAPSULE ORAL at 20:35

## 2021-08-12 RX ADMIN — HYDRALAZINE HYDROCHLORIDE 100 MG: 50 TABLET, FILM COATED ORAL at 17:28

## 2021-08-12 RX ADMIN — SEVELAMER CARBONATE 800 MG: 800 TABLET, FILM COATED ORAL at 17:28

## 2021-08-12 RX ADMIN — SEVELAMER CARBONATE 800 MG: 800 TABLET, FILM COATED ORAL at 07:54

## 2021-08-12 RX ADMIN — LEVOTHYROXINE SODIUM 50 MCG: 0.05 TABLET ORAL at 05:53

## 2021-08-12 RX ADMIN — DOCUSATE SODIUM 50 MG AND SENNOSIDES 8.6 MG 2 TABLET: 8.6; 5 TABLET, FILM COATED ORAL at 05:53

## 2021-08-12 RX ADMIN — AMLODIPINE BESYLATE 10 MG: 10 TABLET ORAL at 05:53

## 2021-08-12 RX ADMIN — ROPINIROLE HYDROCHLORIDE 1 MG: 0.5 TABLET, FILM COATED ORAL at 17:28

## 2021-08-12 RX ADMIN — CARVEDILOL 25 MG: 25 TABLET, FILM COATED ORAL at 08:40

## 2021-08-12 ASSESSMENT — LIFESTYLE VARIABLES
TOTAL SCORE: 0
HOW MANY TIMES IN THE PAST YEAR HAVE YOU HAD 5 OR MORE DRINKS IN A DAY: 0
AVERAGE NUMBER OF DAYS PER WEEK YOU HAVE A DRINK CONTAINING ALCOHOL: 0
ON A TYPICAL DAY WHEN YOU DRINK ALCOHOL HOW MANY DRINKS DO YOU HAVE: 0
HAVE YOU EVER FELT YOU SHOULD CUT DOWN ON YOUR DRINKING: NO
TOTAL SCORE: 0
EVER HAD A DRINK FIRST THING IN THE MORNING TO STEADY YOUR NERVES TO GET RID OF A HANGOVER: NO
TOTAL SCORE: 0
ALCOHOL_USE: NO
CONSUMPTION TOTAL: NEGATIVE
HAVE PEOPLE ANNOYED YOU BY CRITICIZING YOUR DRINKING: NO
EVER FELT BAD OR GUILTY ABOUT YOUR DRINKING: NO
DOES PATIENT WANT TO STOP DRINKING: NO

## 2021-08-12 ASSESSMENT — FIBROSIS 4 INDEX
FIB4 SCORE: 1.98
FIB4 SCORE: 2.17

## 2021-08-12 ASSESSMENT — COGNITIVE AND FUNCTIONAL STATUS - GENERAL
SUGGESTED CMS G CODE MODIFIER MOBILITY: CH
MOBILITY SCORE: 24
SUGGESTED CMS G CODE MODIFIER DAILY ACTIVITY: CH
DAILY ACTIVITIY SCORE: 24

## 2021-08-12 ASSESSMENT — ENCOUNTER SYMPTOMS
DIAPHORESIS: 0
APNEA: 0
TINGLING: 0
ABDOMINAL PAIN: 0
CHOKING: 0
HEADACHES: 0
WHEEZING: 0
SHORTNESS OF BREATH: 0
MYALGIAS: 1
COUGH: 0
DOUBLE VISION: 0
BLURRED VISION: 0
HEMOPTYSIS: 0
WEIGHT LOSS: 0
PALPITATIONS: 0
CHEST TIGHTNESS: 0
LOSS OF CONSCIOUSNESS: 0
DIZZINESS: 0
TREMORS: 0
HEARTBURN: 0
NAUSEA: 0
WEAKNESS: 0
CHILLS: 0
DEPRESSION: 0
STRIDOR: 0
FLANK PAIN: 0
VOMITING: 0
FEVER: 0

## 2021-08-12 ASSESSMENT — PAIN DESCRIPTION - PAIN TYPE: TYPE: ACUTE PAIN

## 2021-08-12 NOTE — CARE PLAN
The patient is Stable - Low risk of patient condition declining or worsening         Progress made toward(s) clinical / shift goals: monitor cardiac rhythm   Problem: Knowledge Deficit - Standard  Goal: Patient and family/care givers will demonstrate understanding of plan of care, disease process/condition, diagnostic tests and medications  Outcome: Progressing

## 2021-08-12 NOTE — ASSESSMENT & PLAN NOTE
Dialysis since 2014; left upper extremity AV fistula  No need for acute dialysis today; will resume MWF schedule  Anuric  Nephrology on board; will follow recommendations

## 2021-08-12 NOTE — PROGRESS NOTES
Received bedside report from TRAVIS Lewis, pt care assumed, VSS, pt assessment complete. Pt AAOx4, c/o no pain at this time. No signs of acute distress noted at this time. POC discussed with pt and verbalizes no questions. Pt denies any additional needs at this time. Bed in lowest position, bed alarm on. Pt educated on fall risk and verbalized understanding, call light within reach, hourly rounding initiated.

## 2021-08-12 NOTE — CARE PLAN
The patient is Stable - Low risk of patient condition declining or worsening         Progress made toward(s) clinical / shift goals:        Problem: Knowledge Deficit - Standard  Goal: Patient and family/care givers will demonstrate understanding of plan of care, disease process/condition, diagnostic tests and medications  Outcome: Progressing

## 2021-08-12 NOTE — PROGRESS NOTES
4 Eyes Skin Assessment Completed by TRAVIS Lewis and TRAVIS Crowe.    Head WDL  Ears WDL  Nose WDL  Mouth WDL  Neck Incision  Breast/Chest WDL  Shoulder Blades WDL  Spine WDL  (R) Arm/Elbow/Hand WDL  (L) Arm/Elbow/Hand WDL  Abdomen WDL  Groin WDL  Scrotum/Coccyx/Buttocks Redness and Blanching  (R) Leg WDL  (L) Leg WDL  (R) Heel/Foot/Toe WDL  (L) Heel/Foot/Toe WDL          Devices In Places Tele Box and Pulse Ox      Interventions In Place Pillows and Pressure Redistribution Mattress    Possible Skin Injury No    Pictures Uploaded Into Epic N/A  Wound Consult Placed N/A  RN Wound Prevention Protocol Ordered No

## 2021-08-12 NOTE — PROGRESS NOTES
Creatinine went from 4.51 to 6.72.  Trop went from 256 to 395, MD Macedo notified. Stat EKG ordered. Orders to hold lisinopril and lasix.

## 2021-08-12 NOTE — ASSESSMENT & PLAN NOTE
Troponins: 64--> 165; most likely due to demand ischemia  BMP: 8305  CAD status post NOEMI x2 to mid RCA in 2016  Echo 6/6/2021 EF 70%, mild LVH, no valvular abnormalities    Cardiology consulted and on board; will follow recommendations  Continue home meds as per cardio recommendations

## 2021-08-12 NOTE — ASSESSMENT & PLAN NOTE
Admitted due to new onset A-Fib with RVR ---> self converted to sinus rhythm in ED; asymptomatic at bedside evaluation    EKG 8/11/2021: A-Fib, paired VPC, prolonged QT interval, repolarization abnormalities suggest ischemia, diffuse leads  Echo 6/6/2021: EF 70%, mild LVH, no valve abnormalities  Troponins: 64--> 165; most likely due to demand ischemia  BNP: 8305  [id number]: 5  Cardiology consulted: Cath cancelled; will switch to apixaban ABS stop IV heparin.  Per Dr. Simpson will have Plavix load then 75 mg with apixaban, no aspirin.  Will follow recommendations.    Admitted patient overnight for observation  On telemetry: Spontaneous PVCs throughout the night; no symptoms  Troponins: 165 --> 256-->395-->437  Trend EKGs (has maintained sinus bradycardia)  TSH: 4.16  Trend CBC  Trend BMP and replete electrolytes as needed  Starting heparin as per cardiology recommendation; will transition to DOAC (most likely apixaban 5 mg twice daily) before discharging  Start Lipitor 80 mg, Coreg 25 mg p.o. twice daily, Lasix 80 mg twice daily, amlodipine, hydralazine 100 mg as needed if BP >170, lisinopril 40 mg  Monitor vitals  Monitor I's and O's  Daily weights

## 2021-08-12 NOTE — CONSULTS
Consult Note: Hematology/Oncology    Date of consultation: 8/12/2021 9:37 AM    Referring provider: Dr. Agnes Garg    Reason for consultation: Transplant evaluation    History of presenting illness:   Thank you very much for allowing me to see  Tere Gallegos today.  As you know she is a 70 y.o. year old female with PMH of hypertension, hyperlipidemia, anuric ESRD (on HD MWF), RCC w/p left nephrectomy, CAD s/p RCA stent. Pending kidney transplant. Patient was admitted for new A [facility] with RVR.     Per chart review, patient has leukopenia, with normal HGB, and low PLT. Low WBC since 2016, with drop down to 1.2 in 11/2020. HGB stable at 8-11 since 2018. Low PLT since 2016, with drop down to 92 in 11/2020. No prior bone marrow biopsy. RCC tx with surgical resection    We used computer .  Patient reports getting dialysis yesterday. Very sleepy this morning. Patient denies fatigue over the last few years. Denies excessive alcohol use. Patient lives in [place]. Worked at home. She reports ambulating well prior to admission, without walker or cane. Denies any tobacco, alcohol, or illicit drug use. Lives with son. Denies any FMH of cancers.     Past Medical History:    Past Medical History:   Diagnosis Date   • Acquired hypothyroidism 5/4/2020   • CAD (coronary artery disease)    • Chronic diastolic heart failure (HCC) 5/4/2020   • Coronary artery disease due to lipid rich plaque     2 Synergy NOEMI to 100% RCA stent placed   • Dental disorder     partial dentures- uppers   • Diabetes ([facility])     oral medication   • ESRD (end stage renal disease) on dialysis ([facility]) 5/4/2020   • Hemodialysis patient ([facility])     M, W, F   • Hyperlipidemia    • Hypertension    • Kidney transplant candidate    • Presence of drug-eluting stent in right coronary artery    • QT prolongation 1/22/2020   • RLS (restless legs syndrome) 8/5/2016   • Transaminitis 12/22/2018     Past surgical history:    Past Surgical History:    Procedure Laterality Date   • [facility] CARDIAC CATH  9/7/2016    RCA stented with 2 Synergy drug-eluting stents.   • RECOVERY  8/16/2016    Procedure: CATH LAB [facility] WITH POSSIBLE DR. CASTILLO;  Surgeon: [facility];  [place] [facility] [place]  Service:    • [facility] CARDIAC CATH  8/16/16    100% RCA   • OTHER Left 2014    left arm upper extremity fistula   • OTHER ABDOMINAL SURGERY      left kidney removed due to cancer     Allergies:  Patient has no known allergies.    Medications:    Current Facility-Administered Medications   Medication Dose Route Frequency Provider Last Rate Last Admin   • carvedilol (COREG) tablet 25 mg  25 mg Oral BID WITH MEALS Gaurav Fairchild A.P.N.   25 mg at 08/12/21 0840   • NS (BOLUS) infusion 250 mL  250 mL Intravenous DIALYSIS PRN Priyank Villar M.D.       • lidocaine (XYLOCAINE) 1 % injection 1 mL  1 mL Intradermal PRN Priyank Villar M.D.       • [START ON 8/13/2021] epoetin (Retacrit) injection (Dialysis Use Only) 3,000 Units  3,000 Units Intravenous MO, WE + FR Priyank Villar M.D.       • senna-docusate (PERICOLACE or SENOKOT S) 8.6-50 MG per tablet 2 Tablet  2 Tablet Oral BID Agnes Garg M.D.   2 Tablet at 08/12/21 0553    And   • polyethylene glycol/lytes (MIRALAX) PACKET 1 Packet  1 Packet Oral QDAY PRN Agnes Garg M.D.        And   • magnesium hydroxide (MILK OF MAGNESIA) suspension 30 mL  30 mL Oral QDAY PRN Agnes Garg M.D.        And   • bisacodyl (DULCOLAX) suppository 10 mg  10 mg Rectal QDAY PRN Agnes Garg M.D.       • gabapentin (NEURONTIN) capsule 600 mg  600 mg Oral QHS Agnes Garg M.D.       • hydrALAZINE (APRESOLINE) tablet 100 mg  100 mg Oral BID Agnes Garg M.D.   100 mg at 08/12/21 0553   • levothyroxine (SYNTHROID) tablet 50 mcg  50 mcg Oral AM ES Agnes Garg M.D.   50 mcg at 08/12/21 0553   • [Held by provider] lisinopril (PRINIVIL) tablet 40 mg  40 mg Oral DAILY Agnes Garg M.D.       • methocarbamol (ROBAXIN) tablet 500 mg  500 mg  Oral TID PRN Agnes Garg M.D.       • ondansetron (ZOFRAN ODT) dispertab 4 mg  4 mg Oral Q8HRS PRN Agnes Garg M.D.       • pioglitazone (ACTOS) tablet 30 mg  30 mg Oral DAILY Agnes Garg M.D.   30 mg at 08/12/21 0754   • ROPINIRole (REQUIP) tablet 1 mg  1 mg Oral BID Agnes Garg M.D.   1 mg at 08/12/21 0553   • sevelamer carbonate (RENVELA) tablet 800 mg  800 mg Oral TID WITH MEALS Agnes Garg M.D.   800 mg at 08/12/21 0754   • hydrALAZINE (APRESOLINE) injection 20 mg  20 mg Intravenous Q6HRS PRN Agnes Garg M.D.       • heparin infusion 25,000 units in 500 mL 0.45% NACL  0-30 Units/kg/hr (Adjusted) Intravenous Continuous Wily Guerrero M.D. 13.6 mL/hr at 08/12/21 0526 12 Units/kg/hr at 08/12/21 0526   • heparin injection 1,700 Units  30 Units/kg (Adjusted) Intravenous PRN Wily Guerrero M.D.       • [Held by provider] furosemide (LASIX) tablet 80 mg  80 mg Oral TID Agnes Garg M.D.   80 mg at 08/11/21 2117   • atorvastatin (LIPITOR) tablet 80 mg  80 mg Oral Q EVENING Agnes Garg M.D.   80 mg at 08/11/21 2118   • amLODIPine (NORVASC) tablet 10 mg  10 mg Oral Q DAY Agnes Garg M.D.   10 mg at 08/12/21 0553     Social History:     Social History     Socioeconomic History   • Marital status:      Spouse name: Not on file   • Number of children: Not on file   • Years of education: Not on file   • Highest education level: Not on file   Occupational History   • Not on file   Tobacco Use   • Smoking status: Never Smoker   • Smokeless tobacco: Never Used   Vaping Use   • Vaping Use: Never used   Substance and Sexual Activity   • Alcohol use: No     Alcohol/week: 0.0 oz   • Drug use: No   • Sexual activity: Never   Other Topics Concern   • Not on file   Social History Narrative   • Not on file     Social Determinants of Health     Financial Resource Strain:    • Difficulty of Paying Living Expenses:    Food Insecurity:    • Worried About Running Out of Food in the Last Year:    • Ran Out  "of Food in the Last Year:    Transportation Needs:    • Lack of Transportation (Medical):    • Lack of Transportation (Non-Medical):    Physical Activity:    • Days of Exercise per Week:    • Minutes of Exercise per Session:    Stress:    • Feeling of Stress :    Social Connections:    • Frequency of Communication with Friends and Family:    • Frequency of Social Gatherings with Friends and Family:    • Attends Nondenominational Services:    • Active Member of Clubs or Organizations:    • Attends Club or Organization Meetings:    • Marital Status:    Intimate Partner Violence:    • Fear of Current or Ex-Partner:    • Emotionally Abused:    • Physically Abused:    • Sexually Abused:      Family History:     Family History   Problem Relation Age of Onset   • Diabetes Sister    • Other Sister         liver disease   • Diabetes Brother    • Heart Disease Neg Hx      Review of Systems:   All other review of systems are negative except what was mentioned above in the HPI.    Constitutional: No fever, chills, weight loss, malaise/fatigue.  Positive for drowsiness  HEENT: No new auditory or visual complaints. No sore throat and neck pain.     Respiratory:No new cough, sputum production, shortness of breath and wheezing.    Cardiovascular: No new chest pain, palpitations, orthopnea and leg swelling.    Gastrointestinal: No heartburn, nausea, vomiting ,abdominal pain, hematochezia or melena     Genitourinary: Negative for dysuria, hematuria    Musculoskeletal: No new arthralgias or myalgias   Skin: Negative for rash and itching.    Neurological: Negative for focal weakness or headaches.    Endo/Heme/Allergies: No abnormal bleed/bruise.    Psychiatric/Behavioral: No new depression/anxiety.    Physical Exam:   Vitals:   /55   Pulse (!) 58   Temp 36.9 °C (98.4 °F) (Temporal)   Resp 18   Ht 1.575 m (5' 2.01\")   Wt 65.4 kg (144 lb 2.9 oz)   LMP  (LMP Unknown)   SpO2 95%   BMI 26.36 kg/m²   General: Not in acute distress, " alert and oriented x 3   HEENT: No pallor, icterus.   Neck: Supple, no palpable masses.  Lymph nodes: No palpable cervical, supraclavicular, axillary or inguinal lymphadenopathy.    CVS: regular rate and rhythm, no rubs or gallops  RESP: Clear to auscultate bilaterally, no wheezing or crackles.   ABD: Soft, non tender, non distended, positive bowel sounds, no palpable organomegaly  EXT: No edema or cyanosis  CNS: Alert and oriented x3, No focal deficits.  Skin: No rash    Labs:   Recent Labs     08/09/21  1325 08/11/21  0934 08/11/21  2044 08/12/21  0201   RBC 3.69* 3.70*  --  3.30*   HEMOGLOBIN 11.5* 11.5*  --  10.5*   HEMATOCRIT 35.4* 34.7*  --  31.3*   PLATELETCT 142* 139*  --  127*   PROTHROMBTM  --   --  13.3  --    APTT  --   --  31.9  --    INR  --   --  1.04  --      Lab Results   Component Value Date/Time    SODIUM 132 (L) 08/12/2021 02:01 AM    POTASSIUM 4.6 08/12/2021 02:01 AM    CHLORIDE 93 (L) 08/12/2021 02:01 AM    CO2 23 08/12/2021 02:01 AM    GLUCOSE 80 08/12/2021 02:01 AM    BUN 35 (H) 08/12/2021 02:01 AM    CREATININE 6.72 (HH) 08/12/2021 02:01 AM    BUNCREATRAT 8 (L) 01/28/2021 07:00 AM      Assessment and Plan:    #Pancytopenia  -Concern for MDS  -No prior exposure to chemotheapy, patient denies chronic alcohol use, TSH normal, normal bilirubin  -Normal spleen and liver on imaging  -Will add reticulocyte count to labs  -B 12 normal in 2019. Will repeat this admission  -Will also order LDH, Uric acid, Iron panel, ferritin   -Patient will need a bone marrow biopsy to rule out MDS. Will order one for tomorrow. NPO at midnight. If scheduling allows, plan for inpatient procedure. Otherwise, this may have to be scheduled as outpatient.   -Patient will need to follow up with oncology clinic 2 wk after BMBX    #ESRD    -Left RCC s/p nephrectomy unclear what year: cxr ok   -On MWF dialysis  -Does not make any urine  -Pending kidney transplant     She agreed and verbalized her agreement and understanding  with the current plan.  I answered all questions and concerns she has at this time.    Thank you for allowing me to participate in her care.  Patient was seen with resident.  I was present and formulated entire plan.    SIGNATURES:

## 2021-08-12 NOTE — NON-PROVIDER
Daily Progress Note:     Date of Service: 8/12/2021  Primary Team: Phelps/White  Attending: Wily Guerrero M.D.   Senior Resident: Agnes Garg MD  Intern: Karely Chapman MD  Medical Student: Taqueria Longoria MS3    Chief Complaint: chest pain    ID: Patient is a 71 yo female admitted to the ED on 8/11/2021. She is a reliable historian. A  was needed.     Subjective:   Ms. Kumari was pleasant and sitting up in bed. She has no new events overnights. She has no complaints of new chest pain, palpitations, SOB, dizziness, headaches, N/V.    Lasix and lisinopril was held overnight due to Cr 6.72.    She's been NPO since midnight for the cardiac cathetherization.     Consultants/Specialty:  Cardiology    Review of Systems:    ROS    Objective:   Physical Exam:   Vitals:   Temp:  [36.4 °C (97.5 °F)-36.9 °C (98.4 °F)] 36.9 °C (98.4 °F)  Pulse:  [54-68] 58  Resp:  [13-38] 18  BP: (108-175)/(52-87) 125/55  SpO2:  [91 %-100 %] 95 %    Physical Exam      Labs:   Recent Labs     08/09/21  1325 08/11/21  0934 08/12/21  0201   WBC 2.4* 1.8* 2.6*   RBC 3.69* 3.70* 3.30*   HEMOGLOBIN 11.5* 11.5* 10.5*   HEMATOCRIT 35.4* 34.7* 31.3*   MCV 95.9 93.8 94.8   MCH 31.2 31.1 31.8   RDW 49.3 46.6 48.5   PLATELETCT 142* 139* 127*   MPV 10.6 10.6 11.3   NEUTSPOLYS 73.00* 68.30 63.60   LYMPHOCYTES 15.20* 20.00* 21.90*   MONOCYTES 9.80 9.40 12.10   EOSINOPHILS 0.80 1.10 1.60   BASOPHILS 0.40 0.60 0.40     Recent Labs     08/09/21  1325 08/11/21  0934 08/12/21  0201   SODIUM 126* 132* 132*   POTASSIUM 4.6 3.4* 4.6   CHLORIDE 92* 93* 93*   CO2 20 24 23   GLUCOSE 102* 94 80   BUN 22 21 35*     Recent Labs     08/09/21  1325 08/11/21  0934 08/12/21  0201   ALBUMIN 4.4 3.8  --    TBILIRUBIN 0.4 0.3  --    ALKPHOSPHAT 115* 102*  --    TOTPROTEIN 7.8 6.4  --    ALTSGPT 16 15  --    ASTSGOT 16 15  --    CREATININE 4.95* 4.51* 6.72*       Imaging:   DX-CHEST-PORTABLE (1 VIEW)   Final Result      1.  Cardiomegaly   2.   Atherosclerosis   3.  Interstitial pulmonary edema, less likely atypical pneumonia      CL-LEFT HEART CATHETERIZATION WITH POSSIBLE INTERVENTION    (Results Pending)   CL-LEFT HEART CATHETERIZATION WITH POSSIBLE INTERVENTION    (Results Pending)       Assessment and Plan:    New atrial fibrillation  -EKG monitor  -Lowering coreg dosage is not necesary at this time because she is sinus kelby, not Afib kelby.  -CBC, BMP, replete electrolytes PRN  -heparin held for cardiac cath today  -plan to transition to Eliquis 5mg BID before discharging (aware of small interaction between coreg and Eliquis; monitor Bun/Cr prn outpatient)  -Continue home lasix, lisinopril, hydralazine and coreg. Hold coreg if BP <100.  -monitor VS, I/Os, and daily weights    ESRD on dialysis  -trend Bun/Cr  -restart lasix and lisinopril   -nephrology consult   -does not meet criteria for acute dialysis today. Plan to resume MWF schedule tomorrow    Leukopenia  -trend CBC  -heme/onc consult in- or outpatient    Coronary artery disease  -trending trop [phone]). Most likely due to accumulation from the Afib and no filtration from kidney failure  -cardio consult   -pending cardiac cath

## 2021-08-12 NOTE — PROGRESS NOTES
Cardiology Follow Up Progress Note    Date of Service  8/12/2021    Attending Physician  Wily Guerrero M.D.    Presented with chest pain, EKG showed A. fib which she self converted without intervention      PMH: Coronary angiography 6/8/2021 demonstrated widely patent stent in mid RCA, otherwise mild nonobstructive CAD, end-stage renal disease on hemodialysis, type 2 diabetes, CAD status post RCA stent, hypertension, dyslipidemia, renal cell carcinoma status post left nephrectomy    Interim Events    Telemetry-SB  No recurrent A. fib overnight  Cancel coronary angiography inpatient  We will evaluate as an outpatient  Plan to stop IV heparin  Switch to apixaban  Plavix load        Review of Systems      Via   Review of Systems   Respiratory: Negative for apnea, cough, choking, chest tightness, shortness of breath, wheezing and stridor.        Vital signs in last 24 hours  Temp:  [36.4 °C (97.5 °F)] 36.4 °C (97.5 °F)  Pulse:  [] 57  Resp:  [7-55] 18  BP: (108-175)/() 137/55  SpO2:  [91 %-100 %] 92 %    Physical Exam  Physical Exam  Cardiovascular:      Rate and Rhythm: Bradycardia present.      Pulses: Normal pulses.   Skin:     General: Skin is warm.   Neurological:      Mental Status: She is alert. Mental status is at baseline.   Psychiatric:         Mood and Affect: Mood normal.         Lab Review  Lab Results   Component Value Date/Time    WBC 2.6 (L) 08/12/2021 02:01 AM    RBC 3.30 (L) 08/12/2021 02:01 AM    HEMOGLOBIN 10.5 (L) 08/12/2021 02:01 AM    HEMATOCRIT 31.3 (L) 08/12/2021 02:01 AM    MCV 94.8 08/12/2021 02:01 AM    MCH 31.8 08/12/2021 02:01 AM    MCHC 33.5 (L) 08/12/2021 02:01 AM    MPV 11.3 08/12/2021 02:01 AM      Lab Results   Component Value Date/Time    SODIUM 132 (L) 08/12/2021 02:01 AM    POTASSIUM 4.6 08/12/2021 02:01 AM    CHLORIDE 93 (L) 08/12/2021 02:01 AM    CO2 23 08/12/2021 02:01 AM    GLUCOSE 80 08/12/2021 02:01 AM    BUN 35 (H) 08/12/2021  02:01 AM    CREATININE 6.72 (HH) 08/12/2021 02:01 AM    BUNCREATRAT 8 (L) 01/28/2021 07:00 AM      Lab Results   Component Value Date/Time    ASTSGOT 15 08/11/2021 09:34 AM    ALTSGPT 15 08/11/2021 09:34 AM     Lab Results   Component Value Date/Time    CHOLSTRLTOT 125 09/29/2020 10:56 AM    LDL 54 09/29/2020 10:56 AM    HDL 48 09/29/2020 10:56 AM    TRIGLYCERIDE 113 09/29/2020 10:56 AM    TROPONINT 395 (H) 08/12/2021 02:01 AM       Recent Labs     08/11/21  0934   NTPROBNP 8305*       Cardiac Imaging and Procedures Review  EKG: Atrial fibrillation, rate 134    Echocardiogram:   6/4/2021  LVEF 70 to 75%  No valvular abnormalities    Cardiac Catheterization:   6/8/2021  Nonobstructive CAD  Widely patent previously placed RCA stents  LVEF 70%  LVEDP 22 mmHg    Imaging  Chest X-Ray:    1.  Cardiomegaly  2.  Atherosclerosis  3.  Interstitial pulmonary edema, less likely atypical pneumonia      Stress Test:    10/20/2020  Normal LV perfusion with no fixed or reversible defect    Assessment/Plan      New onset A. fib RVR  Self converted without intervention  -Currently SB/SR  -IV heparin  -Switch to apixaban 2.5 mg BID (patient with end-stage renal disease, low platelets, weight borderline, currently in sinus rhythm  Recommend for 2.5 twice daily and close follow-up with the Coumadin clinic.  -RADHA VASC (age, hypertension, diabetes, vascular disease)    End-stage renal disease  Dialysis since 2014  [facility] schedule  -Nephrology following  -Transplant candidate    Elevated troponins  Troponin peaked at 395  -Recent [place] 6/21 demonstrated widely patent stents to RCA  -Continue with atorvastatin, carvedilol  -Plan for coronary angiography as an outpatient  -Plavix load  -No aspirin on discharge being on Plavix and apixaban    Hypertension  -Amlodipine 10, carvedilol 25 BID, hydralazine 100 BID, lisinopril 40  -/55      Follow-up on limited echo    Thank you for allowing me to participate in the care of this  patient.      Please contact me with any questions.    DALE Dow.   Cardiologist, [facility]  [phone]

## 2021-08-12 NOTE — DISCHARGE PLANNING
Anticipated Discharge Disposition: home    Action: made pc to pt's preferred pharmacy to inquire about price of eliquis needed for discharge.     Per pharmacy, pt has a copay of $9. Notified md.     Barriers to Discharge: medical clearance,     Plan: LSW to assist as needed and monitor for barriers to discharge.

## 2021-08-12 NOTE — H&P
History & Physical Note    Date of Admission: 8/11/2021  Admission Status: Observation-Outpatient  [facility] Team: [facility]  Attending: Wily Guerrero M.D.   Senior Resident: Dr. Garg   Intern: Dr. Chapa  Contact Number: [phone]    Chief Complaint: Chest pain    History of Present Illness (HPI):   Tere is a 70 y.o. female who presented 8/11/2021 with past medical history of ESRD on HD (MWF since 2014), CAD s/p NOEMI x 2 to mid RCA in 2016, RCC s/pt left nephrectomy, hypertension, dyslipidemia, prediabetes (A1c 5.7), restless leg syndrome, hypothyroidism is admitted to the ED due to chest pain.  Patient remarked she was in her dialysis appointment today when she started to feel left-sided chest pain that radiated down her left arm and to her back, 10/10, constant heavy pressure.  Pain was alleviated slowly as she arrived to the ED.  In the ED she was found to have A. fib with RVR and was given aspirin 324 mg, 1 nitroglycerin, fentanyl and Zofran which relieved her pain.  She self converted without intervention and currently is in normal sinus rhythm without chest pain, palpitations, nausea, vomiting, headache, shortness of breath, weakness, diaphoresis. Patient remarks this is the first time she has ever felt chest pain like this; no history of MI or stroke. Patient remarks compliance with her medications.    Review of Systems:   Review of Systems   Constitutional: Positive for malaise/fatigue. Negative for chills, diaphoresis, fever and weight loss.   HENT: Negative.    Eyes: Negative for blurred vision and double vision.   Respiratory: Negative for cough, sputum production and shortness of breath.    Cardiovascular: Negative for chest pain, palpitations, orthopnea and leg swelling.   Gastrointestinal: Negative for abdominal pain, constipation, diarrhea, heartburn, nausea and vomiting.   Genitourinary: Positive for frequency (less frequency since ~4 years ago).   Musculoskeletal: Negative for  falls and myalgias.   Skin: Negative.    Neurological: Negative for dizziness, tingling, tremors, loss of consciousness, weakness and headaches.   Psychiatric/Behavioral: Negative.        Past Medical History:   Past Medical History was reviewed with patient.   has a past medical history of Acquired hypothyroidism (5/4/2020), CAD (coronary artery disease), Chronic diastolic heart failure (HCC) (5/4/2020), Coronary artery disease due to lipid rich plaque, Dental disorder, Diabetes ([facility]), ESRD (end stage renal disease) on dialysis ([facility]) (5/4/2020), Hemodialysis patient ([facility]), Hyperlipidemia, Hypertension, Kidney transplant candidate, Presence of drug-eluting stent in right coronary artery, QT prolongation (1/22/2020), RLS (restless legs syndrome) (8/5/2016), and Transaminitis (12/22/2018).    Past Surgical History: Past Surgical History was reviewed with patient.   has a past surgical history that includes recovery (8/16/2016); other abdominal surgery; other (Left, 2014); zzz cardiac cath (8/16/16); and zzz cardiac cath (9/7/2016).    Medications: Medications have been reviewed with patient.  Prior to Admission Medications   Prescriptions Last Dose Informant Patient Reported? Taking?   ASPIRIN LOW DOSE 81 MG Chew Tab chewable tablet 8/10/2021 at Unknown time  No No   Sig: TOME FERNANDO TABLETA TODOS LOS MCCORMICK NOT COVERED OTC   Patient taking differently: Chew 81 mg every day.   ROPINIRole (REQUIP) 1 MG Tab 8/10/2021 at Unknown time  No No   Sig: TOME FERNANDO TABLETA DOS VECES AL DESMOND   Patient taking differently: Take 1 mg by mouth 2 times a day.   carvedilol (COREG) 25 MG Tab 8/10/2021 at Unknown time  No No   Sig: TOME FERNANDO TABLETA DOS VECES AL DESMOND CON LAS COMIDAS   Patient taking differently: Take 25 mg by mouth 2 times a day with meals.   furosemide (LASIX) 80 MG Tab 8/10/2021 at Unknown time  Yes Yes   Sig: Take 80 mg by mouth in the morning, at noon, and at bedtime.   gabapentin (NEURONTIN) 300 MG Cap 8/10/2021 at Unknown  time  Yes Yes   Sig: Take 600 mg by mouth at bedtime.   hydrALAZINE (APRESOLINE) 100 MG tablet 8/10/2021 at Unknown time Patient's Home Pharmacy No No   Sig: Take 1 tablet by mouth 2 times a day.   ibuprofen (MOTRIN) 800 MG Tab unknown at Unknown time  No No   Sig: Take 1 tablet by mouth every 8 hours as needed.   levothyroxine (SYNTHROID) 50 MCG Tab 8/10/2021 at Unknown time Patient's Home Pharmacy No No   Sig: Take 1 Tab by mouth Every morning on an empty stomach.   lisinopril (PRINIVIL) 40 MG tablet 8/10/2021 at Unknown time Patient's Home Pharmacy No No   Sig: Take 1 tablet by mouth every day.   methocarbamol (ROBAXIN) 500 MG Tab unknown at Unknown time  No No   Sig: Take 1 tablet by mouth 3 times a day as needed (Leg cramping).   ondansetron (ZOFRAN ODT) 4 MG TABLET DISPERSIBLE unknown at Unknown time  No No   Sig: Take 1 tablet by mouth every 8 hours as needed.   pioglitazone (ACTOS) 30 MG Tab 8/10/2021 at Unknown time Patient's Home Pharmacy No No   Sig: Take 1 Tab by mouth every day.   sevelamer carbonate (RENVELA) 800 MG Tab tablet 8/10/2021 at Unknown time  No No   Sig: Take 1 tablet by mouth 3 times a day with meals.      Facility-Administered Medications: None        Allergies: Allergies have been reviewed with patient.  No Known Allergies    Family History: Father with DM2  family history includes Diabetes in her brother and sister; Other in her sister.     Social History:   Tobacco: denies  Alcohol: denies   Recreational drugs (illegal and prescription):  denies   Employment: homemaker  Activity Level: walks 30 min 2-3x/wk  Living situation:   and son  Recent travel:  denies  Primary Care Provider: reviewed ANGELITA Concepcion  Other (stressors, spirituality, exposures):  denies  Physical Exam:   Vitals:  Temp:  [36.4 °C (97.5 °F)] 36.4 °C (97.5 °F)  Pulse:  [] 56  Resp:  [7-55] 20  BP: (122-168)/() 145/87  SpO2:  [92 %-100 %] 99 %    Physical Exam  Constitutional:        General: She is not in acute distress.     Appearance: Normal appearance. She is not ill-appearing or diaphoretic.   HENT:      Head: Normocephalic and atraumatic.      Nose: Nose normal.      Mouth/Throat:      Mouth: Mucous membranes are dry.      Pharynx: Oropharynx is clear.      Comments: Mallampati 4  Eyes:      Extraocular Movements: Extraocular movements intact.      Conjunctiva/sclera: Conjunctivae normal.      Pupils: Pupils are equal, round, and reactive to light.   Neck:      Vascular: No carotid bruit.   Cardiovascular:      Rate and Rhythm: Normal rate and regular rhythm.      Pulses: Normal pulses.      Heart sounds: Normal heart sounds. No gallop.    Pulmonary:      Effort: Pulmonary effort is normal. No respiratory distress.      Breath sounds: Normal breath sounds. No wheezing or rhonchi.   Chest:      Chest wall: No tenderness.   Abdominal:      General: Bowel sounds are normal. There is no distension.      Palpations: Abdomen is soft.      Tenderness: There is no abdominal tenderness. There is no guarding.   Musculoskeletal:         General: No swelling or tenderness. Normal range of motion.      Cervical back: Neck supple. No tenderness.      Comments: AV fistula in LUE   Skin:     General: Skin is warm and dry.      Coloration: Skin is not pale.   Neurological:      General: No focal deficit present.      Mental Status: She is alert and oriented to person, place, and time. Mental status is at baseline.   Psychiatric:         Mood and Affect: Mood normal.         Behavior: Behavior normal.         Labs:   Recent Labs     08/09/21  1325 08/11/21  0934   WBC 2.4* 1.8*   RBC 3.69* 3.70*   HEMOGLOBIN 11.5* 11.5*   HEMATOCRIT 35.4* 34.7*   MCV 95.9 93.8   MCH 31.2 31.1   RDW 49.3 46.6   PLATELETCT 142* 139*   MPV 10.6 10.6   NEUTSPOLYS 73.00* 68.30   LYMPHOCYTES 15.20* 20.00*   MONOCYTES 9.80 9.40   EOSINOPHILS 0.80 1.10   BASOPHILS 0.40 0.60     Recent Labs     08/09/21  1325 08/11/21  0934   SODIUM  126* 132*   POTASSIUM 4.6 3.4*   CHLORIDE 92* 93*   CO2 20 24   GLUCOSE 102* 94   BUN 22 21     Troponins: 64 --> 165  BNP: 8305    EKG:    Results for orders placed or performed during the hospital encounter of 21   EKG (NOW)   Result Value Ref Range    Report       [facility] Emergency Dept.    Test Date:  2021  Pt Name:    DIRO NICHOLS    Department: ER  MRN:        [id number]                      Room:        04  Gender:     Female                       Technician: [id number]  :        1950                   Requested By:ER TRIAGE PROTOCOL  Order #:    [id number]                    Reading MD: Penelope Smith MD    Measurements  Intervals                                Axis  Rate:       134                          P:  WY:                                      QRS:        66  QRSD:       90                           T:          204  QT:         356  QTc:        532    Interpretive Statements  ATRIAL FIBRILLATION  PAIRED VENTRICULAR PREMATURE COMPLEXES  REPOL ABNRM SUGGESTS ISCHEMIA, DIFFUSE LEADS  PROLONGED QT INTERVAL  No STEMI  Compared to ECG 2021 13:34:52  Atrial fibrillation now present  Possible ischemia now present  Electronically Signed On 2021 10:45:09 PDT by Penelope Smith MD     EKG   Result Value Ref Range    Report       [facility] Emergency Dept.    Test Date:  2021  Pt Name:    DIOR NICHOLS    Department: ER  MRN:        [id number]                      Room:       RD 04  Gender:     Female                       Technician: [id number]  :        1950                   Requested By:ER TRIAGE PROTOCOL  Order #:    [id number]                    Reading MD: Penelope Smith MD    Measurements  Intervals                                Axis  Rate:       57                           P:          -30  WY:         184                          QRS:        76  QRSD:       84                           T:          50  QT:          488  QTc:        476    Interpretive Statements  SINUS BRADYCARDIA  REPOL ABNRM SUGGESTS ISCHEMIA, LATERAL LEADS  No STEMI  Compared to ECG 08/11/2021 09:27:02  Atrial fibrillation no longer present  Ventricular premature complex(es) no longer present  Prolonged QT interval no longer present  Possible ischemia still present  Electronically Signed On 8-11 -2021 11:53:55 PDT by Penelope Smith MD         Imaging:   CXR 8/11/2021 IMPRESSION:     1.  Cardiomegaly  2.  Atherosclerosis  3.  Interstitial pulmonary edema, less likely atypical pneumonia                                             Previous Data Review: reviewed    Problem Representation:   Tere is a 70 y.o. female who presented 8/11/2021 with past medical history of ESRD on HD (MWF since 2014), CAD s/p NOEMI x 2 to mid RCA in 2016, RCC s/pt left nephrectomy, hypertension, dyslipidemia, prediabetes (A1c 5.7), restless leg syndrome, hypothyroidism is admitted to the ED due to chest pain that began during her dialysis today. Patient remarked pain began on her left side of her chest and radiated down her left arm and to her back, 10/10, constant heavy pressure, was alleviated with nitroglycerin. EKG demonstrated new onset of A. fib with RVR and patient spontaneously self converted to NSR; currently asymptomatic. We will admit her for observation overnight and trend her troponins, EKG, vitals, blood pressure control.      * New onset atrial fibrillation (HCC)  Assessment & Plan  Admitted due to new onset A-Fib with RVR ---> self converted to sinus rhythm in ED; asymptomatic at bedside evaluation    EKG 8/11/2021: A-Fib, paired VPC, prolonged QT interval, repolarization abnormalities suggest ischemia, diffuse leads  Echo 6/6/2021: EF 70%, mild LVH, no valve abnormalities  Troponins: 64--> 165; most likely due to demand ischemia  BNP: 8305  [id number]: 5  Cardiology consulted: Symptoms most likely from demand ischemia; per them they well verify if she  requires repeat cardiac catheterization for her renal transplant evaluation     Admitted patient overnight for observation  On telemetry  Trend troponins  Trend EKGs  Check TSH  Trend CBC  Trend BMP and replete electrolytes as needed  Starting heparin as per cardiology recommendation; will transition to DOAC (most likely apixaban 5 mg twice daily) before discharging  Start Lipitor 80 mg, Coreg 25 mg p.o. twice daily, Lasix 80 mg twice daily, amlodipine, hydralazine 100 mg as needed if BP >170, lisinopril 40 mg  Monitor vitals  Monitor I's and O's  Daily weights      Leukopenia  Assessment & Plan  Appears to be chronic  WBC 1.8   *No Tylenol to prevent skin potential neutropenic fever    Trend CBC  Considering consulting heme-onc as this leukopenia is preventing her from having her kidney transplant; if not inpatient then outpatient    ESRD (end stage renal disease) on dialysis (HCC)- (present on admission)  Assessment & Plan  Dialysis since 2014; left upper extremity AV fistula  No need for acute dialysis today; will resume MWF schedule  Anuric  Nephrology on board; will follow recommendations    Coronary artery disease due to lipid rich plaque- (present on admission)  Assessment & Plan  Troponins: 64--> 165; most likely due to demand ischemia  BMP: 8305  CAD status post NOEMI x2 to mid RCA in 2016  Echo 6/6/2021 EF 70%, mild LVH, no valvular abnormalities    Cardiology consulted and on board; will follow recommendations  Continue home meds as per cardio recommendations

## 2021-08-12 NOTE — PROGRESS NOTES
Daily Progress Note:     Date of Service: 8/12/2021  Primary Team: [facility]   Attending: Wily Guerrero M.D.   Senior Resident: Dr. Garg  Intern: Dr. Chapa  Contact:  [phone]    Chief Complaint:   Chest pain    Subjective:  Patient remarks feeling well today.  No acute events overnight.  Denies headache, tremors, nausea, vomiting, chest pain, palpitations, shortness of breath, abdominal pain, weakness, changes in stool.  Patient remarks she still anuric (has history of).     Consultants/Specialty:  Cardiology  Review of Systems:   Review of Systems   Constitutional: Positive for malaise/fatigue. Negative for chills, diaphoresis, fever and weight loss.   HENT: Negative.    Eyes: Negative for blurred vision and double vision.   Respiratory: Negative for cough, hemoptysis and shortness of breath.    Cardiovascular: Negative for chest pain, palpitations and leg swelling.   Gastrointestinal: Negative for abdominal pain, heartburn, nausea and vomiting.   Genitourinary: Positive for dysuria. Negative for flank pain, frequency, hematuria and urgency.   Musculoskeletal: Positive for myalgias.   Skin: Negative.    Neurological: Negative for dizziness, tingling, tremors, loss of consciousness, weakness and headaches.   Psychiatric/Behavioral: Negative for depression.       Objective Data:   Vitals:   Temp:  [36.4 °C (97.5 °F)-36.9 °C (98.4 °F)] 36.4 °C (97.5 °F)  Pulse:  [54-68] 56  Resp:  [13-30] 18  BP: (108-175)/(52-87) 130/55  SpO2:  [91 %-99 %] 91 %  Physical Exam:   Physical Exam  Constitutional:       General: She is not in acute distress.     Appearance: Normal appearance. She is not ill-appearing or diaphoretic.   HENT:      Head: Normocephalic and atraumatic.      Nose: Nose normal.      Mouth/Throat:      Mouth: Mucous membranes are dry.      Pharynx: Oropharynx is clear.   Eyes:      Extraocular Movements: Extraocular movements intact.      Conjunctiva/sclera: Conjunctivae normal.       Pupils: Pupils are equal, round, and reactive to light.   Cardiovascular:      Rate and Rhythm: Regular rhythm. Bradycardia present.      Pulses: Normal pulses.      Heart sounds: Normal heart sounds. No murmur heard.   No gallop.    Pulmonary:      Effort: Pulmonary effort is normal. No respiratory distress.      Breath sounds: Normal breath sounds. No wheezing.   Chest:      Chest wall: No tenderness.   Abdominal:      General: Bowel sounds are normal. There is no distension.      Palpations: Abdomen is soft.      Tenderness: There is no abdominal tenderness. There is no guarding.   Musculoskeletal:         General: No swelling or tenderness. Normal range of motion.      Cervical back: Neck supple.   Skin:     General: Skin is warm and dry.      Coloration: Skin is not jaundiced.   Neurological:      General: No focal deficit present.      Mental Status: She is alert and oriented to person, place, and time. Mental status is at baseline.   Psychiatric:         Mood and Affect: Mood normal.         Behavior: Behavior normal.         Labs:   See results tab    Imaging:   DX-CHEST-PORTABLE (1 VIEW)   Final Result      1.  Cardiomegaly   2.  Atherosclerosis   3.  Interstitial pulmonary edema, less likely atypical pneumonia      CL-LEFT HEART CATHETERIZATION WITH POSSIBLE INTERVENTION    (Results Pending)   EC-ECHOCARDIOGRAM LTD W/O CONT    (Results Pending)      Tere is a 70 y.o. female who presented 8/11/2021 with past medical history of ESRD on HD (MWF since 2014), CAD s/p NOEMI x 2 to mid RCA in 2016, RCC s/pt left nephrectomy, hypertension, dyslipidemia, prediabetes (A1c 5.7), restless leg syndrome, hypothyroidism is admitted to the ED due to chest pain that began during her dialysis yesterday. Patient remains in NSR and asymptomatic.  Nephrology on board; will follow recommendations.  Heme-onc consulted; want to do some blood tests and possible bone marrow biopsy; follow-up.  Cardiology on board: Canceled cath.   Will switch to apixaban ABS stop IV heparin.  Per Dr. Simpson will have Plavix load then 75 mg with apixaban, no aspirin.      * New onset atrial fibrillation (HCC)  Assessment & Plan  Admitted due to new onset A-Fib with RVR ---> self converted to sinus rhythm in ED; asymptomatic at bedside evaluation    EKG 8/11/2021: A-Fib, paired VPC, prolonged QT interval, repolarization abnormalities suggest ischemia, diffuse leads  Echo 6/6/2021: EF 70%, mild LVH, no valve abnormalities  Troponins: 64--> 165; most likely due to demand ischemia  BNP: 8305  [id number]: 5  Cardiology consulted: Cath cancelled; will switch to apixaban ABS stop IV heparin.  Per Dr. Simpson will have Plavix load then 75 mg with apixaban, no aspirin.  Will follow recommendations.    Admitted patient overnight for observation  On telemetry: Spontaneous PVCs throughout the night; no symptoms  Troponins: 165 --> 256-->395-->437  Trend EKGs (has maintained sinus bradycardia)  TSH: 4.16  Trend CBC  Trend BMP and replete electrolytes as needed  Starting heparin as per cardiology recommendation; will transition to DOAC (most likely apixaban 5 mg twice daily) before discharging  Start Lipitor 80 mg, Coreg 25 mg p.o. twice daily, Lasix 80 mg twice daily, amlodipine, hydralazine 100 mg as needed if BP >170, lisinopril 40 mg  Monitor vitals  Monitor I's and O's  Daily weights      Leukopenia  Assessment & Plan  Appears to be chronic  WBC 1.8   *No Tylenol to prevent skin potential neutropenic fever    Trend CBC  Heme-onc consulted by nephrology for this ongoing leukopenia as it is preventing her from having her kidney transplant; pending recommendations    ESRD (end stage renal disease) on dialysis (HCC)- (present on admission)  Assessment & Plan  Dialysis since 2014; left upper extremity AV fistula  No need for acute dialysis today; will resume MWF schedule  Anuric  Nephrology on board; will follow recommendations    Coronary artery disease due to lipid rich  plaque- (present on admission)  Assessment & Plan  Troponins: 64--> 165; most likely due to demand ischemia  BMP: 8305  CAD status post NOEMI x2 to mid RCA in 2016  Echo 6/6/2021 EF 70%, mild LVH, no valvular abnormalities    Cardiology consulted and on board; will follow recommendations  Continue home meds as per cardio recommendations

## 2021-08-12 NOTE — PROGRESS NOTES
Report received at bedside, pt care assumed, tele box on. Pt aaox4, on RA no signs of distress noted at this time. Patient resting comfortably in bed. POC discussed with pt and verbalizes no questions. Pt c/o of no pain. Pt denies any additional needs at this time. Bed in lowest position, pt educated on fall risk and verbalized understanding, call light within reach, will continue hourly rounding.

## 2021-08-12 NOTE — ASSESSMENT & PLAN NOTE
Appears to be chronic  WBC 1.8   *No Tylenol to prevent skin potential neutropenic fever    Trend CBC  Heme-onc consulted by nephrology for this ongoing leukopenia as it is preventing her from having her kidney transplant; pending recommendations

## 2021-08-13 ENCOUNTER — APPOINTMENT (OUTPATIENT)
Dept: RADIOLOGY | Facility: MEDICAL CENTER | Age: 71
DRG: 308 | End: 2021-08-13
Attending: NURSE PRACTITIONER
Payer: MEDICARE

## 2021-08-13 ENCOUNTER — PHARMACY VISIT (OUTPATIENT)
Dept: PHARMACY | Facility: MEDICAL CENTER | Age: 71
End: 2021-08-13
Payer: COMMERCIAL

## 2021-08-13 VITALS
HEIGHT: 62 IN | TEMPERATURE: 97.3 F | BODY MASS INDEX: 26.82 KG/M2 | SYSTOLIC BLOOD PRESSURE: 146 MMHG | HEART RATE: 60 BPM | DIASTOLIC BLOOD PRESSURE: 54 MMHG | RESPIRATION RATE: 17 BRPM | WEIGHT: 145.72 LBS | OXYGEN SATURATION: 96 %

## 2021-08-13 LAB
ALBUMIN SERPL BCP-MCNC: 3.3 G/DL (ref 3.2–4.9)
ALBUMIN/GLOB SERPL: 1.4 G/DL
ALP SERPL-CCNC: 84 U/L (ref 30–99)
ALT SERPL-CCNC: 13 U/L (ref 2–50)
ANION GAP SERPL CALC-SCNC: 13 MMOL/L (ref 7–16)
ANION GAP SERPL CALC-SCNC: 14 MMOL/L (ref 7–16)
AST SERPL-CCNC: 13 U/L (ref 12–45)
BASOPHILS # BLD AUTO: 0.9 % (ref 0–1.8)
BASOPHILS # BLD: 0.02 K/UL (ref 0–0.12)
BILIRUB SERPL-MCNC: 0.2 MG/DL (ref 0.1–1.5)
BUN SERPL-MCNC: 18 MG/DL (ref 8–22)
BUN SERPL-MCNC: 42 MG/DL (ref 8–22)
CALCIUM SERPL-MCNC: 8.4 MG/DL (ref 8.5–10.5)
CALCIUM SERPL-MCNC: 9.1 MG/DL (ref 8.5–10.5)
CHLORIDE SERPL-SCNC: 90 MMOL/L (ref 96–112)
CHLORIDE SERPL-SCNC: 92 MMOL/L (ref 96–112)
CO2 SERPL-SCNC: 22 MMOL/L (ref 20–33)
CO2 SERPL-SCNC: 26 MMOL/L (ref 20–33)
CREAT SERPL-MCNC: 4.95 MG/DL (ref 0.5–1.4)
CREAT SERPL-MCNC: 8.77 MG/DL (ref 0.5–1.4)
EOSINOPHIL # BLD AUTO: 0.05 K/UL (ref 0–0.51)
EOSINOPHIL NFR BLD: 2.3 % (ref 0–6.9)
ERYTHROCYTE [DISTWIDTH] IN BLOOD BY AUTOMATED COUNT: 48.2 FL (ref 35.9–50)
FERRITIN SERPL-MCNC: 1383 NG/ML (ref 10–291)
GLOBULIN SER CALC-MCNC: 2.4 G/DL (ref 1.9–3.5)
GLUCOSE SERPL-MCNC: 204 MG/DL (ref 65–99)
GLUCOSE SERPL-MCNC: 75 MG/DL (ref 65–99)
HCT VFR BLD AUTO: 29.7 % (ref 37–47)
HGB BLD-MCNC: 9.9 G/DL (ref 12–16)
HGB RETIC QN AUTO: 32.9 PG/CELL (ref 29–35)
IMM GRANULOCYTES # BLD AUTO: 0.01 K/UL (ref 0–0.11)
IMM GRANULOCYTES NFR BLD AUTO: 0.5 % (ref 0–0.9)
IMM RETICS NFR: 14.6 % (ref 9.3–17.4)
IRON SATN MFR SERPL: 38 % (ref 15–55)
IRON SERPL-MCNC: 89 UG/DL (ref 40–170)
LDH SERPL L TO P-CCNC: 297 U/L (ref 107–266)
LV EJECT FRACT  99904: 65
LV EJECT FRACT MOD 2C 99903: 67.65
LV EJECT FRACT MOD 4C 99902: 66.89
LV EJECT FRACT MOD BP 99901: 67.49
LYMPHOCYTES # BLD AUTO: 0.52 K/UL (ref 1–4.8)
LYMPHOCYTES NFR BLD: 23.9 % (ref 22–41)
MAGNESIUM SERPL-MCNC: 2.4 MG/DL (ref 1.5–2.5)
MCH RBC QN AUTO: 31.5 PG (ref 27–33)
MCHC RBC AUTO-ENTMCNC: 33.3 G/DL (ref 33.6–35)
MCV RBC AUTO: 94.6 FL (ref 81.4–97.8)
MONOCYTES # BLD AUTO: 0.29 K/UL (ref 0–0.85)
MONOCYTES NFR BLD AUTO: 13.3 % (ref 0–13.4)
NEUTROPHILS # BLD AUTO: 1.29 K/UL (ref 2–7.15)
NEUTROPHILS NFR BLD: 59.1 % (ref 44–72)
NRBC # BLD AUTO: 0 K/UL
NRBC BLD-RTO: 0 /100 WBC
PHOSPHATE SERPL-MCNC: 6.5 MG/DL (ref 2.5–4.5)
PLATELET # BLD AUTO: 123 K/UL (ref 164–446)
PMV BLD AUTO: 10.8 FL (ref 9–12.9)
POTASSIUM SERPL-SCNC: 4 MMOL/L (ref 3.6–5.5)
POTASSIUM SERPL-SCNC: 5.2 MMOL/L (ref 3.6–5.5)
PROT SERPL-MCNC: 5.7 G/DL (ref 6–8.2)
RBC # BLD AUTO: 3.14 M/UL (ref 4.2–5.4)
RETICS # AUTO: 0.07 M/UL (ref 0.04–0.06)
RETICS/RBC NFR: 2.1 % (ref 0.8–2.1)
SODIUM SERPL-SCNC: 127 MMOL/L (ref 135–145)
SODIUM SERPL-SCNC: 130 MMOL/L (ref 135–145)
TIBC SERPL-MCNC: 235 UG/DL (ref 250–450)
UIBC SERPL-MCNC: 146 UG/DL (ref 110–370)
URATE SERPL-MCNC: 4.8 MG/DL (ref 1.9–8.2)
VIT B12 SERPL-MCNC: >4000 PG/ML (ref 211–911)
WBC # BLD AUTO: 2.2 K/UL (ref 4.8–10.8)

## 2021-08-13 PROCEDURE — 99232 SBSQ HOSP IP/OBS MODERATE 35: CPT | Performed by: NURSE PRACTITIONER

## 2021-08-13 PROCEDURE — 83615 LACTATE (LD) (LDH) ENZYME: CPT

## 2021-08-13 PROCEDURE — RXMED WILLOW AMBULATORY MEDICATION CHARGE: Performed by: STUDENT IN AN ORGANIZED HEALTH CARE EDUCATION/TRAINING PROGRAM

## 2021-08-13 PROCEDURE — 93308 TTE F-UP OR LMTD: CPT | Mod: 26 | Performed by: INTERNAL MEDICINE

## 2021-08-13 PROCEDURE — 93325 DOPPLER ECHO COLOR FLOW MAPG: CPT | Mod: 26 | Performed by: INTERNAL MEDICINE

## 2021-08-13 PROCEDURE — 93321 DOPPLER ECHO F-UP/LMTD STD: CPT | Mod: 26 | Performed by: INTERNAL MEDICINE

## 2021-08-13 PROCEDURE — 83540 ASSAY OF IRON: CPT

## 2021-08-13 PROCEDURE — A9270 NON-COVERED ITEM OR SERVICE: HCPCS | Performed by: STUDENT IN AN ORGANIZED HEALTH CARE EDUCATION/TRAINING PROGRAM

## 2021-08-13 PROCEDURE — 80048 BASIC METABOLIC PNL TOTAL CA: CPT

## 2021-08-13 PROCEDURE — 36415 COLL VENOUS BLD VENIPUNCTURE: CPT

## 2021-08-13 PROCEDURE — 82607 VITAMIN B-12: CPT

## 2021-08-13 PROCEDURE — 90935 HEMODIALYSIS ONE EVALUATION: CPT

## 2021-08-13 PROCEDURE — 700102 HCHG RX REV CODE 250 W/ 637 OVERRIDE(OP): Performed by: STUDENT IN AN ORGANIZED HEALTH CARE EDUCATION/TRAINING PROGRAM

## 2021-08-13 PROCEDURE — 90935 HEMODIALYSIS ONE EVALUATION: CPT | Performed by: INTERNAL MEDICINE

## 2021-08-13 PROCEDURE — 83735 ASSAY OF MAGNESIUM: CPT

## 2021-08-13 PROCEDURE — 85025 COMPLETE CBC W/AUTO DIFF WBC: CPT

## 2021-08-13 PROCEDURE — 700102 HCHG RX REV CODE 250 W/ 637 OVERRIDE(OP): Performed by: NURSE PRACTITIONER

## 2021-08-13 PROCEDURE — 85046 RETICYTE/HGB CONCENTRATE: CPT

## 2021-08-13 PROCEDURE — 84550 ASSAY OF BLOOD/URIC ACID: CPT

## 2021-08-13 PROCEDURE — 80053 COMPREHEN METABOLIC PANEL: CPT

## 2021-08-13 PROCEDURE — 83550 IRON BINDING TEST: CPT

## 2021-08-13 PROCEDURE — 99239 HOSP IP/OBS DSCHRG MGMT >30: CPT | Mod: GC | Performed by: INTERNAL MEDICINE

## 2021-08-13 PROCEDURE — A9270 NON-COVERED ITEM OR SERVICE: HCPCS | Performed by: NURSE PRACTITIONER

## 2021-08-13 PROCEDURE — 5A1D70Z PERFORMANCE OF URINARY FILTRATION, INTERMITTENT, LESS THAN 6 HOURS PER DAY: ICD-10-PCS | Performed by: INTERNAL MEDICINE

## 2021-08-13 PROCEDURE — A9502 TC99M TETROFOSMIN: HCPCS

## 2021-08-13 PROCEDURE — 84100 ASSAY OF PHOSPHORUS: CPT

## 2021-08-13 PROCEDURE — 700111 HCHG RX REV CODE 636 W/ 250 OVERRIDE (IP): Mod: TB

## 2021-08-13 PROCEDURE — 82728 ASSAY OF FERRITIN: CPT

## 2021-08-13 RX ORDER — ATORVASTATIN CALCIUM 80 MG/1
80 TABLET, FILM COATED ORAL EVERY EVENING
Qty: 30 TABLET | Refills: 3 | Status: SHIPPED | OUTPATIENT
Start: 2021-08-13 | End: 2021-08-18

## 2021-08-13 RX ORDER — REGADENOSON 0.08 MG/ML
0.4 INJECTION, SOLUTION INTRAVENOUS ONCE
Status: COMPLETED | OUTPATIENT
Start: 2021-08-13 | End: 2021-08-13

## 2021-08-13 RX ORDER — MIDAZOLAM HYDROCHLORIDE 1 MG/ML
.5-2 INJECTION INTRAMUSCULAR; INTRAVENOUS PRN
Status: ACTIVE | OUTPATIENT
Start: 2021-08-13 | End: 2021-08-13

## 2021-08-13 RX ORDER — SODIUM CHLORIDE 9 MG/ML
500 INJECTION, SOLUTION INTRAVENOUS
Status: ACTIVE | OUTPATIENT
Start: 2021-08-13 | End: 2021-08-13

## 2021-08-13 RX ORDER — REGADENOSON 0.08 MG/ML
INJECTION, SOLUTION INTRAVENOUS
Status: COMPLETED
Start: 2021-08-13 | End: 2021-08-13

## 2021-08-13 RX ORDER — AMINOPHYLLINE 25 MG/ML
100 INJECTION, SOLUTION INTRAVENOUS
Status: DISCONTINUED | OUTPATIENT
Start: 2021-08-13 | End: 2021-08-13 | Stop reason: HOSPADM

## 2021-08-13 RX ORDER — CLOPIDOGREL BISULFATE 75 MG/1
75 TABLET ORAL DAILY
Qty: 30 TABLET | Refills: 3 | Status: SHIPPED | OUTPATIENT
Start: 2021-08-14 | End: 2021-08-18

## 2021-08-13 RX ORDER — CLOPIDOGREL BISULFATE 75 MG/1
75 TABLET ORAL DAILY
Qty: 30 TABLET | Refills: 2 | Status: SHIPPED | OUTPATIENT
Start: 2021-08-14 | End: 2021-11-03

## 2021-08-13 RX ORDER — AMLODIPINE BESYLATE 10 MG/1
10 TABLET ORAL DAILY
Qty: 30 TABLET | Refills: 1 | Status: SHIPPED | OUTPATIENT
Start: 2021-08-14 | End: 2021-08-18

## 2021-08-13 RX ORDER — AMLODIPINE BESYLATE 10 MG/1
10 TABLET ORAL DAILY
Qty: 30 TABLET | Refills: 2 | Status: SHIPPED | OUTPATIENT
Start: 2021-08-14 | End: 2021-11-03

## 2021-08-13 RX ORDER — ATORVASTATIN CALCIUM 80 MG/1
80 TABLET, FILM COATED ORAL EVERY EVENING
Qty: 30 TABLET | Refills: 2 | Status: SHIPPED | OUTPATIENT
Start: 2021-08-13 | End: 2021-11-10

## 2021-08-13 RX ORDER — ACETAMINOPHEN 500 MG
1000 TABLET ORAL ONCE
Status: CANCELLED | OUTPATIENT
Start: 2021-08-13 | End: 2021-08-13

## 2021-08-13 RX ADMIN — SEVELAMER CARBONATE 800 MG: 800 TABLET, FILM COATED ORAL at 17:20

## 2021-08-13 RX ADMIN — PIOGLITAZONE 30 MG: 30 TABLET ORAL at 05:38

## 2021-08-13 RX ADMIN — HYDRALAZINE HYDROCHLORIDE 100 MG: 50 TABLET, FILM COATED ORAL at 05:43

## 2021-08-13 RX ADMIN — LEVOTHYROXINE SODIUM 50 MCG: 0.05 TABLET ORAL at 05:38

## 2021-08-13 RX ADMIN — AMLODIPINE BESYLATE 10 MG: 10 TABLET ORAL at 05:38

## 2021-08-13 RX ADMIN — REGADENOSON 0.4 MG: 0.08 INJECTION, SOLUTION INTRAVENOUS at 14:50

## 2021-08-13 RX ADMIN — HYDRALAZINE HYDROCHLORIDE 100 MG: 50 TABLET, FILM COATED ORAL at 17:19

## 2021-08-13 RX ADMIN — ROPINIROLE HYDROCHLORIDE 1 MG: 0.5 TABLET, FILM COATED ORAL at 05:37

## 2021-08-13 RX ADMIN — FUROSEMIDE 80 MG: 40 TABLET ORAL at 17:19

## 2021-08-13 RX ADMIN — EPOETIN ALFA-EPBX 3000 UNITS: 3000 INJECTION, SOLUTION INTRAVENOUS; SUBCUTANEOUS at 10:59

## 2021-08-13 RX ADMIN — ATORVASTATIN CALCIUM 80 MG: 80 TABLET, FILM COATED ORAL at 17:19

## 2021-08-13 RX ADMIN — ROPINIROLE HYDROCHLORIDE 1 MG: 0.5 TABLET, FILM COATED ORAL at 17:20

## 2021-08-13 RX ADMIN — LISINOPRIL 40 MG: 20 TABLET ORAL at 05:38

## 2021-08-13 ASSESSMENT — ENCOUNTER SYMPTOMS
WHEEZING: 0
CHEST TIGHTNESS: 0
CHOKING: 0
STRIDOR: 0
APNEA: 0
COUGH: 0
SHORTNESS OF BREATH: 0

## 2021-08-13 NOTE — PROGRESS NOTES
Nephrology Daily Progress Note    Date of Service  8/12/2021    Chief Complaint  70 y.o. female with pre-diabetes, hypertension, end-stage kidney disease on dialysis Monday Wednesday Friday who presented 8/11/2021 with chest pain and palpitations, found to have new onset Atrial fibrillation.    Interval Problem Update  8/12 - self-converted to sinus rhythm yesterday. Feels well today, denies chest pain, SOB.     Review of Systems  Review of Systems   Constitutional: Negative for fever.   Respiratory: Negative for shortness of breath.    Cardiovascular: Negative for chest pain.   Gastrointestinal: Negative for abdominal pain.   All other systems reviewed and are negative.       Physical Exam  Temp:  [36.4 °C (97.5 °F)-36.9 °C (98.4 °F)] 36.7 °C (98 °F)  Pulse:  [52-68] 53  Resp:  [13-20] 18  BP: (108-157)/(52-83) 134/58  SpO2:  [91 %-96 %] 93 %    Physical Exam  Constitutional:       General: She is not in acute distress.     Appearance: She is well-developed.   HENT:      Head: Normocephalic and atraumatic.      Mouth/Throat:      Mouth: Mucous membranes are moist.      Pharynx: Oropharynx is clear. No oropharyngeal exudate.   Eyes:      General: No scleral icterus.     Extraocular Movements: Extraocular movements intact.   Neck:      Trachea: No tracheal deviation.   Cardiovascular:      Rate and Rhythm: Normal rate and regular rhythm.      Heart sounds: Normal heart sounds. No murmur heard.     Pulmonary:      Effort: Pulmonary effort is normal.      Breath sounds: No stridor. No rales.   Abdominal:      Palpations: Abdomen is soft.      Tenderness: There is no abdominal tenderness.   Musculoskeletal:         General: Normal range of motion.      Cervical back: Normal range of motion. No rigidity.      Right lower leg: No edema.      Left lower leg: No edema.   Skin:     General: Skin is warm and dry.   Neurological:      General: No focal deficit present.      Mental Status: She is alert and oriented to person,  place, and time.   Psychiatric:         Mood and Affect: Mood normal.         Behavior: Behavior normal.     Access: Left UE AVF +bruit/thrill      Fluids    Intake/Output Summary (Last 24 hours) at 8/12/2021 1828  Last data filed at 8/12/2021 1557  Gross per 24 hour   Intake 120 ml   Output --   Net 120 ml       Laboratory  Labs reviewed, pertinent labs below.  Recent Labs     08/11/21  0934 08/12/21  0201   WBC 1.8* 2.6*   RBC 3.70* 3.30*   HEMOGLOBIN 11.5* 10.5*   HEMATOCRIT 34.7* 31.3*   MCV 93.8 94.8   MCH 31.1 31.8   MCHC 33.1* 33.5*   RDW 46.6 48.5   PLATELETCT 139* 127*   MPV 10.6 11.3     Recent Labs     08/11/21  0934 08/12/21  0201   SODIUM 132* 132*   POTASSIUM 3.4* 4.6   CHLORIDE 93* 93*   CO2 24 23   GLUCOSE 94 80   BUN 21 35*   CREATININE 4.51* 6.72*   CALCIUM 8.9 8.8     Recent Labs     08/11/21 2044   APTT 31.9   INR 1.04           URINALYSIS:  No results found for: COLORURINE, CLARITY, SPECGRAVITY, PHURINE, KETONES, PROTEINURIN, BILIRUBINUR, UROBILU, NITRITE, LEUKESTERAS, OCCULTBLOOD  UPC  No results found for: TOTPROTUR No results found for: CREATININEU      Imaging interpreted by radiologist. Imaging reports reviewed with pertinent findings below  DX-CHEST-PORTABLE (1 VIEW)   Final Result      1.  Cardiomegaly   2.  Atherosclerosis   3.  Interstitial pulmonary edema, less likely atypical pneumonia      EC-ECHOCARDIOGRAM LTD W/O CONT    (Results Pending)         Current Facility-Administered Medications   Medication Dose Route Frequency Provider Last Rate Last Admin   • carvedilol (COREG) tablet 25 mg  25 mg Oral BID WITH MEALS Gaurav Fairchild, A.P.N.   25 mg at 08/12/21 0840   • [START ON 8/13/2021] clopidogrel (PLAVIX) tablet 75 mg  75 mg Oral DAILY Gaurav Fairchild, A.P.N.       • apixaban (ELIQUIS) tablet 2.5 mg  2.5 mg Oral BID Gaurav Fairchild, A.P.N.   2.5 mg at 08/12/21 1728   • NS (BOLUS) infusion 250 mL  250 mL Intravenous DIALYSIS PRN Priyank Villar M.D.       • lidocaine (XYLOCAINE) 1 %  injection 1 mL  1 mL Intradermal PRN Priyank Villar M.D.       • [START ON 8/13/2021] epoetin (Retacrit) injection (Dialysis Use Only) 3,000 Units  3,000 Units Intravenous MO, WE + FR Priyank Villar M.D.       • senna-docusate (PERICOLACE or SENOKOT S) 8.6-50 MG per tablet 2 Tablet  2 Tablet Oral BID Agnes Garg M.D.   2 Tablet at 08/12/21 1729    And   • polyethylene glycol/lytes (MIRALAX) PACKET 1 Packet  1 Packet Oral QDAY PRN Agnes Garg M.D.        And   • magnesium hydroxide (MILK OF MAGNESIA) suspension 30 mL  30 mL Oral QDAY PRN Agnes Garg M.D.        And   • bisacodyl (DULCOLAX) suppository 10 mg  10 mg Rectal QDAY PRN Agnes Garg M.D.       • gabapentin (NEURONTIN) capsule 600 mg  600 mg Oral QHS Agnes Garg M.D.       • hydrALAZINE (APRESOLINE) tablet 100 mg  100 mg Oral BID Agnes Garg M.D.   100 mg at 08/12/21 1728   • levothyroxine (SYNTHROID) tablet 50 mcg  50 mcg Oral AM ES Agnes Garg M.D.   50 mcg at 08/12/21 0553   • lisinopril (PRINIVIL) tablet 40 mg  40 mg Oral DAILY Agnes Garg M.D.       • methocarbamol (ROBAXIN) tablet 500 mg  500 mg Oral TID PRN Agnes Garg M.D.       • ondansetron (ZOFRAN ODT) dispertab 4 mg  4 mg Oral Q8HRS PRN Agnes Garg M.D.       • pioglitazone (ACTOS) tablet 30 mg  30 mg Oral DAILY Agnes Garg M.D.   30 mg at 08/12/21 0754   • ROPINIRole (REQUIP) tablet 1 mg  1 mg Oral BID Agnes Garg M.D.   1 mg at 08/12/21 1728   • sevelamer carbonate (RENVELA) tablet 800 mg  800 mg Oral TID WITH MEALS Agnes Garg M.D.   800 mg at 08/12/21 1728   • hydrALAZINE (APRESOLINE) injection 20 mg  20 mg Intravenous Q6HRS PRN Agnes Garg M.D.       • furosemide (LASIX) tablet 80 mg  80 mg Oral TID Agnes Garg M.D.   80 mg at 08/12/21 1414   • atorvastatin (LIPITOR) tablet 80 mg  80 mg Oral Q EVENING Agnes Garg M.D.   80 mg at 08/12/21 1728   • amLODIPine (NORVASC) tablet 10 mg  10 mg Oral Q DAY Agnes Garg M.D.   10 mg at 08/12/21 0558          Assessment/Plan  70 y.o. female with pre-diabetes, hypertension, end-stage kidney disease on dialysis Monday Wednesday Friday who presented 8/11/2021 with chest pain and palpitations.     1.  ESRD on hemodialysis Monday Wednesday Friday.  Anuric.  No acute need for HD today. Check labs daily.      2.  Access:  left brachiocephalic AV fistula. Stable. Continue left arm precautions.    3.  New onset atrial fibrillation with RVR, reason for admission.  Self-converted.  As the patient is less than 80 years old, and weight is greater than 60 kg, she might be a candidate for regular strength apixaban 5 mg p.o. twice daily.     4.  Leukopenia, noted going back years.  Persistent. Appreciate hem/onc consult, possible bone marrow biopsy indicated.     5.  Anemia of chronic disease. Persistent. Continue low-dose Epogen thrice weekly with dialysis.     6.  Thrombocytopenia, worsening, but mild. Check CBC daily.      7.  Hyponatremia, persistent.  In this anuric patient, this is due to excess water intake.  Continue to Restrict fluids to 1 L daily.  Check labs daily.       Priyank Villar MD  Nephrology

## 2021-08-13 NOTE — PROCEDURES
Diagnosis: End-Stage Renal Disease admitted with chest pain, found to have new diagnosis of atrial fibrillation, self converted to sinus rhythm. Patient seen and examined on hemodialysis during treatment. Patient is stable, tolerating hemodialysis. Denies chest pain and shortness of breath. Orders updated as needed. Please refer to flowsheet for full details.    Access: Left brachiocephalic AV fistula  UF goal: 2 L    Plan: Continue dialysis on a Monday Wednesday Friday schedule.  Recommend bone marrow biopsy for work-up of leukopenia.  Appreciate hematology oncology assistance.  Patient may be discharged after bone marrow biopsy.    OK to discharge from Nephrology standpoint.    There is no need for outpatient nephrology clinic follow up appointment, as the patient will be seen by a nephrologist at their outpatient dialysis center.     Priyank Villar MD  Nephrology

## 2021-08-13 NOTE — DISCHARGE INSTRUCTIONS
- Follow up with PCP in 7 days for medication reconciliation and coordination of new specialist referral   - Discuss with PCP regarding stopping Plavix and Aspirin and Apixaban for Bone Marrow biopsy  - Follow up with Dr. Prasad for low blood count  - Follow up with Kidney specialist and transplant specialist as scheduled      Clasificación de las arritmias  (Arrhythmia Categories)  El corazón es un músculo que funciona noemi bernardo bomba para enviar andrae a través de todo el organismo por medio de contracciones regulares. Los latidos son controlados por un sistema de células especiales que funcionan noemi marcapasos. Estas células controlan la actividad eléctrica del corazón. Cuando el sistema que controla kiya latido regular se perturba, se producen anormalidades cardíacas (arritmias).  CUANDO FALTA UN LATIDO  Bernardo de las arritmias más comunes y menos graves es el llamado latido auricular ectópico o prematuro. Se manifiesta noemi un pequeño cambio en la regularidad del pulso. Se origina en la parte superior del corazón (aurícula). Dentro de la aurícula derecha se encuentra el nodo sinusal, el que normalmente controla la regularidad del corazón. El latido auricular ectópico se produce en el tejido que no pertenece a la región del nodo sinusal. Puede sentirse noemi que falta un latido o un aleteo, especialmente si ocurren varios seguidos o se producen con frecuencia.   Otro tipo de arritmia es el complejo ventricular prematuro. Estos latidos extra comienzan en la región inferior del corazón que son las cámaras cardíacas con más músculo. En la mayoría de los casos, son inofensivas. Si hay causas subyacentes que irritan el corazón, noemi bernardo tiroides hiperactiva o un ataque cardíaco previo, puede ser bernardo situación más preocupante. En algunos casos le indicarán medicamentos para controlar el ritmo cardíaco.  Para cuidarse en el hogar:  · Disminuya o evite el consumo de alcohol, tabaco y cafeína.   · Duerma lo suficiente.    · Reduzca las situaciones de estrés.   · Practique más actividad física.   CUANDO EL CORAZÓN LATE DEMASIADO RÁPIDO  La taquicardia auricular consiste en bernardo frecuencia cardíaca rápida que se inicia en la aurícula. [place] puede durar desde minutos hasta mucho tiempo. El corazón puede llegar a latir entre 140 y 240 veces por minuto, en lugar de donahue frecuencia normal que es entre 60 y 100 latidos por minuto.  · Los síntomas son un sentimiento de preocupación (ansiedad) y la sensación de que el corazón late rápida e intensamente.   · Puede disminuir la frecuencia cardíaca rápida conteniendo la respiración o haciendo fuerza noemi si fuera a  el intestino.   · [place] tipo de taquicardia generalmente no es peligrosa.   La fibrilación auricular y el aleteo auricular son otros tipos de ritmo cardíaco acelerado que comienzan en la aurícula. Ambos impiden que la aurícula se llene con la cantidad de andrae suficiente, por lo tanto el corazón no funciona shantell.  · Los síntomas incluyen mareos o desmayos.   · Estas frecuencias rápidas pueden causar lesiones o enfermedades en el corazón. Un nivel elevado de hormona tiroidea puede tener influencia.   · Puede ser que no se conozca la causa o que el origen sea bernardo enfermedad o lesión en el corazón.   · Será necesario que se someta a un tratamiento eléctrico especial (cardioversión) o que tome medicamentos para que el corazón vuelva a latir normalmente.   La taquicardia ventricular es bernardo frecuencia cardíaca rápida que comienza en las cámaras cardíacas inferiores (ventrículos). Es bernardo enfermedad grave que requiere tratamiento urgente. Será necesario que alguien consiga y use un pequeño desfibrilador.  · Los síntomas son: colapso, dolor en el pecho o falta de aire.   · El tratamiento consiste en la administración de medicamentos, procedidmientos para mejorar el flujo sanguíneo o un desfibrilador cardíaco implantable.   DIAGNÓSTICO  · Le solicitarán un electrocardiograma (ECG) para  observar el tipo de arritmia, así noemi pruebas de laboratorio para diagnosticar la causa subyacente.   · Si los latidos extra o la frecuencia rápida aparecen y desaparecen, le indicarán un monitoreo Holter, que registrará la frecuencia cardíaca irma un período más madi.   INSTRUCCIONES PARA EL CUIDADO DOMICILIARIO  Si el médico le ha dado fecha para bernardo visita de control, es importante que concurra. No cumplir con kiya control puede eden noemi resultado un ataque cardíaco o un daño, dolor o discapacidad permanentes. Si tiene problemas para asistir al control, deberá comunicarlo en kiya establecimiento para recibir asesoramiento.   ROSIE A SALCEDO MÉDICO SI USTED SIENTE:  · Latidos cardíacos irregulares (palpitaciones)   · Brincos o vuelcos del corazón.   · Aturdimiento.   · Malestar en el pecho.   · Falta de aire.   · Sufre episodios más frecuentes y ya ha comenzado un tratamiento.   SOLICITE ATENCIÓN MÉDICA DE INMEDIATO SI:  · Siente dolor intenso en el pecho, especialmente si es opresivo o similar a bernardo presión y se expande hacia los brazos, a la espalda, al crystal o a la mandíbula o si ha transpirado, ha sentido ganas de vomitar (náuseas ) o le falta el aire. ESTO ES BERNARDO EMERGENCIA. No espere a que el dolor se vaya. Pida ayuda médica de inmediato. Llame al 911 o al 0 (operador) NO CONDUZCA hasta el hospital por bhaskar propios medios.   · Se siente mareado o sufre un desmayo.   · Si sufre episodios de taquicardia auricular que no se resuelven con las técnicas que el profesional que lo asiste le ha enseñado.   · Los latidos rápidos o irregulares ocurren con más frecuencia que antes, especialmente si se asocian con síntomas más intensos o de mayor duración.   Document Released: 04/05/2010 Document Revised: 03/11/2013  ExitCare® Patient Information ©2013 [employer].  - [place]oll        Discharge Instructions      Discharged to home by car with relative. Discharged via wheelchair, hospital escort: Yes.  Special equipment  needed: Not Applicable    Be sure to schedule a follow-up appointment with your primary care doctor or any specialists as instructed.     Discharge Plan:        I understand that a diet low in cholesterol, fat, and sodium is recommended for good health. Unless I have been given specific instructions below for another diet, I accept this instruction as my diet prescription.   Other diet: Renal    Special Instructions: None    · Is patient discharged on Warfarin / Coumadin?   No     Depression / Suicide Risk    As you are discharged from this [facility] facility, it is important to learn how to keep safe from harming yourself.    Recognize the warning signs:  · Abrupt changes in personality, positive or negative- including increase in energy   · Giving away possessions  · Change in eating patterns- significant weight changes-  positive or negative  · Change in sleeping patterns- unable to sleep or sleeping all the time   · Unwillingness or inability to communicate  · Depression  · Unusual sadness, discouragement and loneliness  · Talk of wanting to die  · Neglect of personal appearance   · Rebelliousness- reckless behavior  · Withdrawal from people/activities they love  · Confusion- inability to concentrate     If you or a loved one observes any of these behaviors or has concerns about self-harm, here's what you can do:  · Talk about it- your feelings and reasons for harming yourself  · Remove any means that you might use to hurt yourself (examples: pills, rope, extension cords, firearm)  · Get professional help from the community (Mental Health, Substance Abuse, psychological counseling)  · Do not be alone:Call your Safe Contact- someone whom you trust who will be there for you.  · Call your local CRISIS HOTLINE 723-9053 or 440-306-3413  · Call your local Children's Mobile Crisis Response Team [place] (959) 590-8787 or www.[employer]  · Call the toll free National Suicide Prevention Hotlines    · National Suicide Prevention Lifeline 250-839-ZPJE (3682)  · [facility] 800-SUICIDE (949-9617)          Atrial Fibrillation  Atrial fibrillation is a type of irregular or rapid heartbeat (arrhythmia). In atrial fibrillation, the top part of the heart (atria) quivers in a chaotic pattern. This makes the heart unable to pump blood normally. Having atrial fibrillation can increase your risk for other health problems, such as:  · Blood can pool in the atria and form clots. If a clot travels to the brain, it can cause a stroke.  · The heart muscle may weaken from the irregular blood flow. This can cause heart failure.  Atrial fibrillation may start suddenly and stop on its own, or it may become a long-lasting problem.  What are the causes?  This condition is caused by some heart-related conditions or procedures, including:  · High blood pressure. This is the most common cause.  · Heart failure.  · Heart valve conditions.  · Inflammation of the sac that surrounds the heart (pericarditis).  · Heart surgery.  · Coronary artery disease.  · Certain heart rhythm disorders, such as Bailey-Parkinson-White syndrome.  Other causes include:  · Pneumonia.  · Obstructive sleep apnea.  · Lung cancer.  · Thyroid problems, especially if the thyroid is overactive (hyperthyroidism).  · Excessive alcohol or drug use.  Sometimes, the cause of this condition is not known.  What increases the risk?  This condition is more likely to develop in:  · Older people.  · People who smoke.  · People who have diabetes mellitus.  · People who are overweight (obese).  · Athletes who exercise vigorously.  · People who have a family history.  What are the signs or symptoms?  Symptoms of this condition include:  · A feeling that your heart is beating rapidly or irregularly.  · A feeling of discomfort or pain in your chest.  · Shortness of breath.  · Sudden light-headedness or weakness.  · Getting tired easily during exercise.  In some  cases, there are no symptoms.  How is this diagnosed?  Your health care provider may be able to detect atrial fibrillation when taking your pulse. If detected, this condition may be diagnosed with:  · Electrocardiogram (ECG).  · Ambulatory cardiac monitor. This device records your heartbeats for 24 hours or more.  · Transthoracic echocardiogram (TTE) to evaluate how blood flows through your heart.  · Transesophageal echocardiogram (VELMA) to view more detailed images of your heart.  · A stress test.  · Imaging tests, such as a CT scan or chest X-ray.  · Blood tests.  How is this treated?  This condition may be treated with:  · Medicines to slow down the heart rate or bring the heart's rhythm back to normal.  · Medicines to prevent blood clots from forming.  · Electrical cardioversion. This delivers a low-energy shock to the heart to reset its rhythm.  · Ablation. This procedure destroys the part of the heart tissue that sends abnormal signals.  · Left atrial appendage occlusion/excision. This seals off a common place in the atria where blood clots can form (left atrial appendage).  The goal of treatment is to prevent blood clots from forming and to keep your heart beating at a normal rate and rhythm. Treatment depends on underlying medical conditions and how you feel when you are experiencing fibrillation.  Follow these instructions at home:  Medicines  · Take over-the counter and prescription medicines only as told by your health care provider.  · If your health care provider prescribed a blood-thinning medicine (anticoagulant), take it exactly as told. Taking too much blood-thinning medicine can cause bleeding. Taking too little can enable a blood clot to form and travel to the brain, causing a stroke.  Lifestyle         · Do not use any products that contain nicotine or tobacco, such as cigarettes and e-cigarettes. If you need help quitting, ask your health care provider.  · Do not drink beverages that contain  "caffeine, such as coffee, soda, and tea.  · Follow diet instructions as told by your health care provider.  · Exercise regularly as told by your health care provider.  · Do not drink alcohol.  General instructions  · If you have obstructive sleep apnea, manage your condition as told by your health care provider.  · Maintain a healthy weight. Do not use diet pills unless your health care provider approves. Diet pills may make heart problems worse.  · Keep all follow-up visits as told by your health care provider. This is important.  Contact a health care provider if you:  · Notice a change in the rate, rhythm, or strength of your heartbeat.  · Are taking an anticoagulant and you notice increased bruising.  · Tire more easily when you exercise or exert yourself.  · Have a sudden change in weight.  Get help right away if you have:    · Chest pain, abdominal pain, sweating, or weakness.  · Difficulty breathing.  · Blood in your vomit, stool (feces), or urine.  · Any symptoms of a stroke. \"BE FAST\" is an easy way to remember the main warning signs of a stroke:  ? B - Balance. Signs are dizziness, sudden trouble walking, or loss of balance.  ? E - Eyes. Signs are trouble seeing or a sudden change in vision.  ? F - Face. Signs are sudden weakness or numbness of the face, or the face or eyelid drooping on one side.  ? A - Arms. Signs are weakness or numbness in an arm. This happens suddenly and usually on one side of the body.  ? S - Speech. Signs are sudden trouble speaking, slurred speech, or trouble understanding what people say.  ? T - Time. Time to call emergency services. Write down what time symptoms started.  · Other signs of a stroke, such as:  ? A sudden, severe headache with no known cause.  ? Nausea or vomiting.  ? Seizure.  These symptoms may represent a serious problem that is an emergency. Do not wait to see if the symptoms will go away. Get medical help right away. Call your local emergency services (651 in " the [place] Do not drive yourself to the hospital.  Summary  · Atrial fibrillation is a type of irregular or rapid heartbeat (arrhythmia).  · Symptoms include a feeling that your heart is beating fast or irregularly. In some cases, you may not have symptoms.  · The condition is treated with medicines to slow down the heart rate or bring the heart's rhythm back to normal. You may also need blood-thinning medicines to prevent blood clots.  · Get help right away if you have symptoms or signs of a stroke.  This information is not intended to replace advice given to you by your health care provider. Make sure you discuss any questions you have with your health care provider.  Document Released: 12/18/2006 Document Revised: 02/07/2019 Document Reviewed: 02/08/2019  Elsevier Patient Education © 2020 [employer]      Fibrilación auricular  Atrial Fibrillation  La fibrilación auricular es un tipo de latido cardíaco irregular o rápido (arritmia). En la fibrilación auricular, la parte superior del corazón (aurículas) tiembla con un patrón caótico. [place] hace que el corazón no pueda bombear andrae normalmente. Tener fibrilación auricular puede aumentar el riesgo de otros problemas de lazaro, noemi:  · La andrae puede acumularse en las aurículas y formar coágulos. Si un coágulo viaja hasta el cerebro, puede provocar un accidente cerebrovascular.  · El músculo cardíaco puede debilitarse noemi consecuencia del torrente irregular de andrae. [place] puede producir bernardo insuficiencia cardíaca.  La fibrilación auricular puede comenzar de repente y detenerse my, o puede convertirse en un problema de duración prolongada.  ¿Cuáles son las causas?  La causa de esta afección es algún procedimiento o afección relacionados con el corazón, entre ellos:  · Presión arterial meliza. Esta es la causa más frecuente.  · Insuficiencia cardíaca.  · Afección de las válvulas del corazón.  · Inflamación de la membrana que rodea el corazón  (pericarditis).  · Cirugía cardíaca.  · Arteriopatía coronaria.  · Ciertos trastornos del ritmo cardíaco, noemi el síndrome de Alverto-Parkinson-White.  Algunas otras causas son las siguientes:  · Neumonía.  · Apnea obstructiva del sueño.  · Cáncer de pulmón.  · Problemas de tiroides, especialmente si la tiroides es hiperactiva (hipertiroidismo).  · Uso excesivo de alcohol o drogas.  A veces, se desconoce la causa de esta afección.  ¿Qué incrementa el riesgo?  Es más probable que esta afección se manifieste en:  · Personas de edad avanzada.  · Las personas que fuman.  · Personas con diabetes mellitus.  · Personas con sobrepeso (obesos).  · Atletas que realizan actividad física vigorosa.  · Personas con antecedentes familiares.  ¿Cuáles son los signos o síntomas?  Los síntomas de esta afección incluyen:  · Sensación de que el corazón late rápida o irregularmente.  · Sensación de molestias o dolor en el pecho.  · Falta de aire.  · Mareos o debilidad repentinos.  · Cansarse con facilidad irma la actividad física.  En algunos casos no hay síntomas.  ¿Cómo se diagnostica?  El médico puede detectar la fibrilación auricular al tomarle el pulso. Si se detecta, esta afección podría diagnosticarse a través de lo siguiente:  · Electrocardiograma (ECG).  · Monitor cardíaco ambulatorio. [place] dispositivo registra bhaskar latidos cardíacos irma 24 horas o más.  · Ecocardiograma transtorácico (ETT) para evaluar el flujo de la andrae por el corazón.  · Ecocardiograma transesofágico (ETE) para observar imágenes más detalladas del corazón.  · [place] ergometría.  · Estudios de diagnóstico por imágenes, noemi julianna exploración por tomografía computarizada (TC) o julianna radiografía torácica.  · Análisis de andrae.  ¿Cómo se trata?  El tratamiento de esta afección puede incluir:  · Medicamentos para disminuir la frecuencia cardíaca o normalizan el ritmo cardíaco.  · Medicamentos para prevenir la formación de coágulos de andrae.  · Cardioversión  eléctrica. [place] emite un choque de energía baja para que el corazón restablezca donahue ritmo.  · Ablación. [place] procedimiento destruye la parte del tejido cardíaco que envía señales anormales.  · Oclusión/escisión del apéndice auricular cody. [place] amauri un lugar común en las aurículas donde se pueden formar coágulos de andrae (apéndice auricular cody).  El objetivo del tratamiento es prevenir la formación de coágulos sanguíneos y hacer que el corazón quang con bernardo frecuencia y con un ritmo normales. El tratamiento depende de las afecciones preexistentes y de cómo se siente cuando experimenta fibrilación.  Siga estas indicaciones en donahue casa:  Medicamentos  · [place] los medicamentos de venta candelaria y los recetados solamente noemi se lo haya indicado el médico.  · Si el médico le recetó medicamentos anticoagulantes, tómelos exactamente noemi le dijeron. El exceso de anticoagulantes puede causar hemorragias. La deficiencia de anticoagulantes puede permitir la formación de coágulos de andrae y donahue desplazamiento al cerebro, lo cual puede ocasionar un accidente cerebrovascular.  Estilo de augie         · No consuma ningún producto que contenga nicotina o tabaco, noemi cigarrillos y cigarrillos electrónicos. Si necesita ayuda para dejar de consumir, consulte al médico.  · No ingiera bebidas que contengan cafeína, noemi café, gaseosas y té.  · Siga las instrucciones para la dieta noemi se lo haya indicado donahue médico.  · Shilpa actividad física habitualmente noemi se lo haya indicado el médico.  · No milli alcohol.  Indicaciones generales  · Si tiene apnea obstructiva del sueño, controle la afección noemi se lo haya indicado el médico.  · Mantenga un peso saludable. No tome pastillas para adelgazar a menos que el médico lo autorice. Estas pastillas pueden agravar los problemas cardíacos.  · Concurra a todas las visitas de control noemi se lo haya indicado el médico. [place] es importante.  Comuníquese con un médico si:  · Nota un cambio  "en la frecuencia, el ritmo o la fuerza de los latidos cardíacos.  · Liz un anticoagulante y observa un aumento en los moretones.  · Se cansa con mayor facilidad cuando se esfuerza o realiza ejercicio físico.  · Tiene cambios repentinos en el peso.  Solicite ayuda inmediatamente si tiene:    · Dolor de pecho, dolor abdominal, sudoración o debilidad.  · Dificultad para respirar.  · [place] en el vómito, en la materia fecal (heces) o en la orina.  · Cualquier síntoma de un accidente cerebrovascular. \"BE FAST\" es bernardo manera fácil de recordar las principales señales de advertencia de un accidente cerebrovascular:  ? B - Balance (equilibrio). Los signos son mareo, dificultad repentina para caminar o pérdida del equilibrio.  ? E - Eyes (ojos). Los signos son problemas para emiliano o un cambio repentino en la visión.  ? F - Face (alexx). Los signos son debilidad repentina o adormecimiento del alexx, o el alexx o el párpado que se caen hacia un lado.  ? A - Arms (brazos). Los signos son debilidad u entumecimiento en un brazo. [place] sucede de repente y generalmente en un lado del cuerpo.  ? S - Speech (habla). Los signos son dificultad repentina para hablar, hablar arrastrando las palabras o dificultad para comprender lo que la gente dice.  ? T - Time (tiempo). Es tiempo de llamar al servicio de emergencias. [place] la hora en la que comenzaron los síntomas.  · Otros signos de accidente cerebrovascular, tales noemi:  ? Dolor de sergey súbito e intenso que no tiene causa aparente.  ? Náuseas o vómitos.  ? Convulsiones.  Estos síntomas pueden representar un problema grave que constituye bernardo emergencia. No espere hasta que los síntomas desaparezcan. Solicite atención médica de inmediato. Comuníquese con el servicio de emergencias de donahue localidad (911 en los [place]). No conduzca por bhaskar propios medios hasta el hospital.  Resumen  · La fibrilación auricular es un tipo de latido cardíaco irregular o rápido (arritmia).  · Los " síntomas incluyen sensación de que el corazón late rápida o irregularmente. En ocasiones, es posible que no tenga síntomas.  · La afección se trata con medicamentos que disminuyen la frecuencia cardíaca o normalizan el ritmo cardíaco. También es posible que necesite anticoagulantes para prevenir coágulos de andrae.  · Obtenga ayuda de inmediato si tiene signos o síntomas de un accidente cerebrovascular.  Esta información no tiene noemi fin reemplazar el consejo del médico. Asegúrese de hacerle al médico cualquier pregunta que tenga.  Document Released: 12/18/2006 Document Revised: 03/30/2019 Document Reviewed: 03/30/2019  Elsevier Patient Education © 2020 [employer] Inc[employer]        Diálisis  Dialysis  La diálisis es un procedimiento que se realiza cuando los riñones estes dejado de funcionar adecuadamente (insuficiencia renal). También podría indicarse antes si pudiera mejorar los síntomas. Irma la diálisis, se eliminan los desechos, las sales y el exceso de agua de la andrae y se mantiene el nivel de ciertos minerales en la andrae.  La diálisis se realiza en sesiones que continúan hasta que los riñones mejoran. Si los riñones no mejoran, noemi sucede en la enfermedad renal terminal, la diálisis se continúa de por augie o hasta que usted pueda recibir un nuevo riñón de un donante (trasplante renal). Hay dos tipos de diálisis: hemodiálisis y diálisis peritoneal.  ¿Qué es la hemodiálisis?              La hemodiálisis es cuando se utiliza bernardo máquina llamada dializador para filtrar la andrae. Antes de comenzar la hemodiálisis, le harán bernardo cirugía para crear un lugar en el que la andrae pueda extraerse del cuerpo y volver a ingresar en él (acceso vascular). Hay lyssa tipos de accesos vasculares:  · Fístula arteriovenosa. Para crear kiya tipo de acceso, se conectan bernardo arteria y bernardo vena (por lo general, del brazo) irma bernardo cirugía. La fístula arteriovenosa, generalmente, demora de 1 a 6 meses en formarse después de la  cirugía. Puede durar más años que los otros tipos de acceso y es menos probable que se infecte o que se formen coágulos de andrae.  · Injerto arteriovenoso. Para crear kiya tipo de acceso, se conectan bernardo arteria y bernardo vena del brazo con un tubo irma bernardo cirugía. El injerto arteriovenoso, generalmente, puede usarse en el término de 2 a 3 semanas después de la cirugía.  · Catéter venoso. Para crear kiya tipo de acceso, se coloca un tubo harris (catéter) en bernardo vena curtis del crystal, el tórax o la consuelo. El catéter venoso puede usarse inmediatamente. Por lo general, se usa noemi un acceso temporario cuando es necesario que la diálisis comience de inmediato.  Irma la hemodiálisis, la andrae sale del cuerpo a través del lugar de acceso. Viaja a través del tubo al dializador, donde se filtra. Luego la andrae regresa al cuerpo a través de otro tubo.  La hemodiálisis, generalmente, se realiza en el hospital o en un centro de diálisis, lyssa veces por semana. Las sesiones gerard entre 3 y 5 horas. Con bernardo capacitación especial, también puede hacerse en el hogar, con la ayuda de otra persona.  ¿Qué es la diálisis peritoneal?  La diálisis peritoneal es cuando se utiliza la membrana delgada que tapiza el abdomen (peritoneo) y un líquido llamado dialisato para filtrar la andrae. Antes de comenzar con la diálisis peritoneal, le harán bernardo cirugía para colocarle un catéter en el abdomen. El catéter se usará para introducir y extraer del abdomen el dialisato.  Al inicio de la sesión, le llenarán el abdomen con dialisato. Irma la sesión, los desechos, las sales y el exceso de líquidos de la andrae pasan a través del peritoneo y hacia el dialisato. El dialisato se drena del cuerpo al finalizar la sesión. El proceso de llenar y drenar el dialisato se llama intercambio. Los intercambios se repiten hasta que haya usado todo el dialisato del día.  Usted puede realizar la diálisis peritoneal en donahue hogar o en james cualquier otro  lugar. Se realiza todos los días. Es posible que necesite hasta julio intercambios por día. Cada intercambio dura entre 30 y 40 minutos. La cantidad de tiempo que permanece el dialisato en el organismo entre los intercambios se llama tiempo de permanencia. El tiempo de permanencia, normalmente, dura entre 1,5 y 3 horas y puede variar con cada persona. Puede escoger hacer los intercambios por la noche mientras duerme, mediante bernardo máquina llamada ciclador.  ¿Qué tipo de diálisis huang elegir?  Ambos tipos de diálisis tienen ventajas y desventajas. Hable con donahue médico acerca de qué tipo de diálisis es el mejor para usted. Hay que considerar bhaskar preferencias, donahue estilo de augie y donahue enfermedad. En algunos casos, puede elegirse solo un tipo de diálisis.  Ventajas de la hemodiálisis  · Se realiza con menos frecuencia que la diálisis peritoneal.  · Otra persona puede hacer la diálisis por usted.  · Si se dirige a un centro de diálisis:  ? Donahue médico puede reconocer cualquier problema que pueda tener.  ? Puede interactuar con otras personas a quienes les hacen diálisis. Aquí podrá encontrar apoyo emocional.  Desventajas de la hemodiálisis  · La hemodiálisis puede causar calambres e hipotensión arterial. Puede dejarle bernardo sensación de cansancio en los wanda en que le realizan el tratamiento.  · Si concurre a un centro de diálisis, deberá concertar citas semanales en los horarios disponibles del centro.  · Deberá tener más cuidado cuando viaje. Si suele tratarse en un centro de diálisis, será necesario planificar visitas a un centro de diálisis cerca de donahue destino. Si realiza el tratamiento en donahue casa, será necesario que lleve el dializador con usted cuando viaje.  · Hay más restricciones en la alimentación que con la diálisis peritoneal.  Ventajas de la diálisis peritoneal  · Es menos probable que cause calambres e hipotensión arterial.  · Hay menos restricciones en la alimentación que con la hemodiálisis.  · Podrá realizar los  intercambios usted mismo, dondequiera que se encuentre, incluso cuando viaje.  Desventajas de la diálisis peritoneal  · Debe realizarse con más frecuencia que la hemodiálisis.  · Realizar bernardo diálisis peritoneal exige un uso adecuado (destreza) de las mis. También debe ser capaz de levantar bolsas.  · Deba aprender cómo deshacerse de los gérmenes en donahue equipo (técnicas de esterilización). Necesitará usar estas técnicas todos los días para prevenir bernardo infección.  ¿Qué cambios deberé hacer en la alimentación irma la diálisis?  Ambos tipos de diálisis requieren que miranda algunos cambios en donahue alimentación. Por ejemplo, deberá limitar la ingesta de alimentos con alto contenido de fósforo y potasio. También deberá limitar la ingesta de líquidos. Un especialista en alimentación y nutrición (nutricionista) lo puede ayudar a elaborar un plan de comidas que lo ayude a mejorar donahue diálisis y donahue lazaro.  ¿Qué huang esperar al comenzar la diálisis?  Adaptarse al tratamiento de diálisis, a las citas programadas y a la alimentación puede roscoe algún tiempo. Puede que sea necesario que deje de trabajar y no pueda hacer algunas de bhaskar actividades normales. Es probable que se sienta ansioso o deprimido cuando inicie la diálisis. Con el tiempo, muchas personas se sienten mejor rocio a la diálisis. Es posible que vuelva a trabajar después de realizar algunos cambios, noemi disminuir la intensidad del trabajo.  Dónde encontrar más información  · Fundación Nacional del [place] (National Kidney Foundation): www.kidney.org  · Asociación Americana de Pacientes Renales (American Association of Kidney Patients, AAKP): www.aakp.org  · Fondo Americano para Problemas Renales (American Kidney Fund): www.kidneyfund.org  Resumen  · Irma la diálisis, se eliminan los desechos, las sales y el exceso de agua de la andrae y se mantiene el nivel de ciertos minerales en la andrae. Hay dos tipos de diálisis: hemodiálisis y diálisis peritoneal.  · La  hemodiálisis es cuando se utiliza bernardo máquina llamada dializador para filtrar la andrae.  · La hemodiálisis, generalmente, la realiza un médico en el hospital o en un centro de diálisis, lyssa veces por semana.  · La diálisis peritoneal es cuando el peritoneo se utiliza noemi filtro. Usted puede realizar la diálisis peritoneal en donahue hogar o en james cualquier otro lugar.  · Ambos tipos de diálisis tienen ventajas y desventajas. Hable con donahue médico acerca de qué tipo de diálisis es el mejor para usted.  Esta información no tiene noemi fin reemplazar el consejo del médico. Asegúrese de hacerle al médico cualquier pregunta que tenga.  Document Released: 12/18/2006 Document Revised: 03/15/2019 Document Reviewed: 06/21/2018  [employer] Patient Education © 2020 [employer]        Aspiración y biopsia de médula ósea, en adultos  Bone Marrow Aspiration and Bone Marrow Biopsy, Adult  La aspiración y biopsia de médula ósea son procedimientos que se realizan para diagnosticar trastornos sanguíneos. También puede realizarse sanchez de estos procedimientos para ayudar a diagnosticar cáncer o determinadas infecciones.  La médula ósea es el tejido blando que está dentro de los huesos. La médula ósea es la encargada de producir las células sanguíneas. Para realizar bernardo aspiración de médula ósea, se yanet bernardo muestra de tejido líquido del interior del hueso. Para realizar bernardo biopsia de médula ósea, se extrae bernardo pequeña muestra de tejido sólido de la médula ósea. Estas muestras se examinan con un microscopio o se analizan en un laboratorio.  Fabrice vez deba someterse a estos procedimientos si los resultados de bhaskar hemogramas completos (HC) son anormales. Generalmente, la muestra de la aspiración o la biopsia de médula ósea se yanet de la parte superior del hueso de la cadera. A veces, la muestra de la aspiración se yanet del hueso del pecho (kerry).  Informe al médico acerca de lo siguiente:  · Cualquier alergia que tenga.  · Todos los  medicamentos que utiliza, incluidos vitaminas, hierbas, gotas oftálmicas, cremas y medicamentos de venta candelaria.  · Cualquier problema previo que usted o algún miembro de donahue bertha haya tenido con los anestésicos.  · Enfermedades de la andrae o los huesos que tenga.  · Cirugías a las que se haya sometido.  · Cualquier afección médica que tenga.  · Si está embarazada o podría estarlo.  ¿Cuáles son los riesgos?  En general, se trata de un procedimiento seguro. Sin embargo, pueden ocurrir complicaciones, por ejemplo:  · Infección.  · Sangrado.  · Dolor persistente después del procedimiento.  · Rotura (fractura) del hueso.  · Reacciones alérgicas a los medicamentos.  ¿Qué ocurre antes del procedimiento?  Hidratación  Siga las indicaciones del médico acerca de mantenerse hidratado, las cuales pueden incluir lo siguiente:  · Hasta 2 horas antes del procedimiento, puede beber líquidos riddhi, noemi agua, jugos de fruta sin pulpa, café erasmo y té solo.    Restricciones en las comidas y bebidas  Siga las instrucciones del médico respecto de las restricciones en las comidas o las bebidas, las cuales pueden incluir lo siguiente:  · 8 horas antes del procedimiento, no coma alimentos pesados, por ejemplo, jossie, alimentos con alto contenido graso o fritos.  · 6 horas antes del procedimiento, deje de ingerir comidas o alimentos livianos, noemi tostadas o cereales.  · 6 horas antes del procedimiento, deje de romi leche o bebidas que contengan leche.  · 2 horas antes del procedimiento, deje de beber líquidos riddhi.  Medicamentos  · Consulte al médico sobre:  ? Cambiar o suspender los medicamentos que yanet habitualmente. [place] es muy importante si yanet medicamentos para la diabetes o anticoagulantes.  ? Romi medicamentos noemi aspirina e ibuprofeno. Estos medicamentos pueden tener un efecto anticoagulante en la andrae. No tome estos medicamentos a menos que el médico se lo indique.  ? Romi medicamentos de venta candelaria, vitaminas,  hierbas y suplementos.  Indicaciones generales  · Shilpa que alguien lo lleve a donahue casa desde el hospital o la clínica.  · Si se irá a donahue casa inmediatamente después del procedimiento, planifique que alguien se quede con usted irma 24 horas.  · Pregúntele al médico cómo se marcará o identificará el lugar de la cirugía.  · Pregúntele al médico qué medidas se tomarán para ayudar a prevenir bernardo infección. Estas medidas pueden incluir:  ? Rasurar el vello del lugar de la cirugía.  ? Deniz la piel con un jabón antiséptico.  ? Suministrar antibióticos.  ¿Qué ocurre irma el procedimiento?    · Pueden colocarle bernardo vía intravenosa en bernardo vena.  · Le administrarán sanchez o más de los siguientes medicamentos:  ? Un medicamento para ayudarlo a relajarse (sedante).  ? Un medicamento para adormecer la yo (anestesia local).  ? Un medicamento que lo hará dormir (anestesia general).  · La muestra de médula ósea se tomará de la siguiente forma:  ? Para bernardo aspiración, se insertará bernardo aguja hueca a través de la piel hasta llegar al hueso. Se recogerá líquido de la médula ósea en bernardo jeringa.  ? Para bernardo biopsia, el médico usará bernardo aguja hueca para extraer bernardo pequeña muestra de tejido sólido de la médula ósea.  · Se retirará la aguja.  · El líquido o tejido de la médula ósea se enviará a un laboratorio para ser examinado.  · Se colocará un apósito (vendaje) sobre el lugar de la inserción y se pegará con cinta adhesiva.  [place] procedimiento puede variar según el médico y el hospital.  ¿Qué ocurre después del procedimiento?  · Le controlarán la presión arterial, la frecuencia cardíaca, la frecuencia respiratoria y el nivel de oxígeno en la andrae hasta que le den el meliza del hospital o la clínica.  · Se extraerá la vía intravenosa, y se controlará que no haya sangrado en el lugar de la inserción.  · No conduzca irma 24 horas si le administraron un sedante irma el procedimiento.  Resumen  · La aspiración y biopsia de médula  ósea son procedimientos que se realizan para diagnosticar trastornos sanguíneos. También puede realizarse sanchez de estos procedimientos para ayudar a diagnosticar cáncer o determinadas infecciones.  · Antes del procedimiento, informe a donahue médico todos los medicamentos que utiliza, incluso vitaminas, hierbas, gotas oftálmicas, cremas y medicamentos de venta candelaria.  · Richie el procedimiento de aspiración, se yanet bernardo muestra de tejido líquido del interior del hueso. Para realizar bernardo biopsia de médula ósea, se extrae bernardo pequeña muestra de tejido sólido de la médula ósea.  · Después del procedimiento, lo controlarán y verificarán si tiene sangrado.  · Shilpa que alguien lo lleve a donahue casa desde el hospital o la clínica.  Esta información no tiene noemi fin reemplazar el consejo del médico. Asegúrese de hacerle al médico cualquier pregunta que tenga.  Document Released: 04/14/2014 Document Revised: 05/13/2019 Document Reviewed: 05/31/2017  [employer] Patient Education © 2020 [employer]        Bone Marrow Aspiration and Bone Marrow Biopsy, Adult  Bone marrow aspiration and bone marrow biopsy are procedures that are done to diagnose blood disorders. You may also have one of these procedures to help diagnose cancer or certain infections.  Bone marrow is the soft tissue that is inside your bones. Blood cells are produced in bone marrow. For bone marrow aspiration, a sample of tissue in liquid form is removed from inside your bone. For a bone marrow biopsy, a small sample of solid bone marrow tissue is removed. These samples are examined under a microscope or tested in a lab.  You may need these procedures if you have an abnormal complete blood count (CBC). The aspiration or biopsy sample is usually taken from the top of your hip bone. Sometimes, an aspiration sample is taken from your chest bone (sternum).  Tell a health care provider about:  · Any allergies you have.  · All medicines you are taking, including vitamins,  herbs, eye drops, creams, and over-the-counter medicines.  · Any problems you or family members have had with anesthetic medicines.  · Any blood or bone disorders you have.  · Any surgeries you have had.  · Any medical conditions you have.  · Whether you are pregnant or may be pregnant.  What are the risks?  Generally, this is a safe procedure. However, problems may occur, including:  · Infection.  · Bleeding.  · Persistent pain after the procedure.  · Cracking (fracture) of the bone.  · Allergic reactions to medicines.  What happens before the procedure?  Staying hydrated  Follow instructions from your health care provider about hydration, which may include:  · Up to 2 hours before the procedure - you may continue to drink clear liquids, such as water, clear fruit juice, black coffee, and plain tea.    Eating and drinking restrictions  Follow instructions from your health care provider about eating and drinking, which may include:  · 8 hours before the procedure - stop eating heavy meals or foods, such as meat, fried foods, or fatty foods.  · 6 hours before the procedure - stop eating light meals or foods, such as toast or cereal.  · 6 hours before the procedure - stop drinking milk or drinks that contain milk.  · 2 hours before the procedure - stop drinking clear liquids.  Medicines  · Ask your health care provider about:  ? Changing or stopping your regular medicines. This is especially important if you are taking diabetes medicines or blood thinners.  ? Taking medicines such as aspirin and ibuprofen. These medicines can thin your blood. Do not take these medicines unless your health care provider tells you to take them.  ? Taking over-the-counter medicines, vitamins, herbs, and supplements.  General instructions  · Plan to have someone take you home from the hospital or clinic.  · If you will be going home right after the procedure, plan to have someone with you for 24 hours.  · Ask your health care provider how  your surgical site will be marked or identified.  · Ask your health care provider what steps will be taken to help prevent infection. These may include:  ? Removing hair at the surgery site.  ? Washing skin with a germ-killing soap.  ? Taking antibiotic medicine.  What happens during the procedure?    · An IV may be inserted into one of your veins.  · You will be given one or more of the following:  ? A medicine to help you relax (sedative).  ? A medicine to numb the area (local anesthetic).  ? A medicine to make you fall asleep (general anesthetic).  · The bone marrow sample will be removed as follows:  ? For an aspiration, a hollow needle will be inserted through your skin and into your bone. Bone marrow fluid will be drawn up into a syringe.  ? For a biopsy, your health care provider will use a hollow needle to remove a small sample of solid tissue from your bone marrow.  · The needle will be removed.  · Bone marrow fluid or tissue will be sent to a lab for examination.  · A bandage (dressing) will be placed over the insertion site and taped in place.  The procedure may vary among health care providers and hospitals.  What happens after the procedure?  · Your blood pressure, heart rate, breathing rate, and blood oxygen level will be monitored until you leave the hospital or clinic.  · Your IV will be removed, and the insertion site will be checked for bleeding.  · Do not drive for 24 hours if you were given a sedative during your procedure.  Summary  · Bone marrow aspiration and bone marrow biopsy are procedures that are done to diagnose blood disorders. You may also have one of these procedures to help diagnose cancer or certain infections.  · Before the procedure, tell your health care provider about all medicines you are taking, including vitamins, herbs, eye drops, creams, and over-the-counter medicines.  · During the aspiration procedure, a sample of tissue in liquid form is removed from inside your bone.  For a bone marrow biopsy, a small sample of solid bone marrow tissue is removed.  · After the procedure, you will be monitored and checked for bleeding.  · Plan to have someone take you home from the hospital or clinic.  This information is not intended to replace advice given to you by your health care provider. Make sure you discuss any questions you have with your health care provider.  Document Released: 12/21/2005 Document Revised: 03/28/2019 Document Reviewed: 05/31/2017  [employer] Patient Education © 2020 [employer]

## 2021-08-13 NOTE — CARE PLAN
The patient is Watcher - Medium risk of patient condition declining or worsening    Shift Goals  Clinical Goals: Maintain hemodynamics. Bone Marrow Biopsy.      Problem: Knowledge Deficit - Standard  Goal: Patient and family/care givers will demonstrate understanding of plan of care, disease process/condition, diagnostic tests and medications  Outcome: Progressing     Problem: Communication  Goal: The ability to communicate needs accurately and effectively will improve  Outcome: Progressing     Problem: Hemodynamics  Goal: Patient's hemodynamics, fluid balance and neurologic status will be stable or improve  Outcome: Progressing     Problem: Mobility  Goal: Patient's capacity to carry out activities will improve  Outcome: Progressing     Problem: Self Care  Goal: Patient will have the ability to perform ADLs independently or with assistance (bathe, groom, dress, toilet and feed)  Outcome: Progressing

## 2021-08-13 NOTE — PROGRESS NOTES
Bedside report received from nurse. Assumed care of pt. Pt resting comfortably in bed. A/Ox4, VSS,  All needs met. POC reviewed and white board updated. Tele box on. Call light in reach. Bed locked in lowest position with 2 upper bed rails up. No pain or complaints. Language line used

## 2021-08-13 NOTE — PROGRESS NOTES
Autumn from Lab called with critical result of WBC 2.2 at 0447. Critical lab result read back to Autumn.   Dr. Edna Macedo notified of critical lab result at 0504.

## 2021-08-13 NOTE — DISCHARGE PLANNING
Completed approved services for pt to receive discharge medications needed. Total came to $24.69. faxed to [phone]

## 2021-08-13 NOTE — DISCHARGE SUMMARY
Discharge Summary    Date of Admission: 8/11/2021  Date of Discharge: 8/13/2021  6:47 PM  Discharging Attending: Wily Guerrero   Discharging Senior Resident: Dr. Garg  Discharging Intern: Dr. Chapa    CHIEF COMPLAINT ON ADMISSION  Chief Complaint   Patient presents with   • Chest Pain   • Atrial Fibrillation       Reason for Admission  New onset Atrial Fibrillation    Admission Date  8/11/2021    CODE STATUS  Prior    HPI & HOSPITAL COURSE  70 y.o F w/ pmhx pre-diabetes, HTN, HLD, anuric ESRD on dialysis MWF, renal cell carcinoma s/p left nephrectom, CAD s/p RCA stent BIBA w/ c/o chest pain while at her dialysis session today. Patient received aspirin, fentanyl, nitro and zofran in the ambulance. On presentation to the ED, the patient was found to be in Afib w/ RVR up to rate in 140s,  Afib spontaneously converted without intervention. Initial troponin was at 64 which has been baseline for her. Repeat Troponin elevated up to 164. Initial EKG shows Afib w/ rate of 124, with lateral and inferior ST depression. Repeat EKG after spontaneous conversion shows sinus bradycardia and still persistent ST depression in lateral leads. Patient recently underwent left heart cath as part of her transplant evaluation in 6/21, no obstructing stenosis found. EDP consulted Dr. Simpson, cardiology, who recommended anticoagulation and trending troponin. Patient was admitted to telemetry for monitoring, troponin 165, 256, 395, 437. Patient remained in SR and had no further chest pain during the admission. She underwent nuclear stress testing on 8/13 which found no evidence of acute ischemia.     Patient received dialysis on the morning of discharge (normal schedule) without complications. Evaluation for chronic neutropenia initiated while inpatient as this was holding up her transplant. Patient will complete outpatient bone marrow biopsy outpatient and follow up with Dr. Prasad to complete workup for suspected MDS.     Therefore, she  is discharged in fair and stable condition to home with close outpatient follow-up.    The patient met 2-midnight criteria for an inpatient stay at the time of discharge.    PHYSICAL EXAM ON DISCHARGE  Temp:  [35.6 °C (96.1 °F)-36.3 °C (97.3 °F)] 36.3 °C (97.3 °F)  Pulse:  [54-60] 60  Resp:  [16-18] 17  BP: (113-146)/(42-55) 146/54  SpO2:  [90 %-96 %] 96 %     Constitutional:  No acute distress. A&O x3, on RA  HENMT:  Normocephalic, Atraumatic, Mallampati 4  Eyes:  PERRLA, EOMI, Conjunctiva normal  Neck:  Thick, Supple, Full range of motion, No stridor  Cardiovascular:  Regular rate and rhythm, No murmurs, No rubs, No gallops.   Lungs: Respiratory effort is normal, no crackles, no wheezing.  Extremities: Good pedal pulses b/l, no edema, 5/5 strength.  Abdomen: Bowel sounds x4, Soft, Non-tender, Non-distended, No guarding, No rebound, No masses.  Neurologic: Good sensations, Cranial nerves II through XII grossly intact. No focal deficits noted.      Discharge Date  8/13/2021    FOLLOW UP ITEMS POST DISCHARGE  Bone marrow biopsy 8/25/2021  Follow up with Dr. Prasad in clinic  Follow up with outpatient nephrology for dialysis     DISCHARGE DIAGNOSES  Principal Problem:    New onset atrial fibrillation (HCC) POA: Unknown  Active Problems:    Coronary artery disease due to lipid rich plaque (Chronic) POA: Yes      Overview: 2 Synergy NOEMI to 100% RCA stent placed    ESRD (end stage renal disease) on dialysis (HCC) POA: Yes    Leukopenia POA: Unknown  Resolved Problems:    * No resolved hospital problems. *      FOLLOW UP  Future Appointments   Date Time Provider Department Center   8/18/2021 11:00 AM ANGELITA Concepcion   8/31/2021  9:15 AM Aster Metz P.A.-C. [facility] None   12/3/2021  3:40 PM ANGELITA Portillo     No follow-up provider specified.    MEDICATIONS ON DISCHARGE     Medication List      START taking these medications      Instructions   amLODIPine 10 MG  Tabs  Start taking on: August 14, 2021  Commonly known as: NORVASC   [place] 1 tableta por vía oral diariamente.  (Take 1 Tablet by mouth every day.)  Dose: 10 mg     apixaban 2.5mg Tabs  Commonly known as: ELIQUIS   Take 1 Tablet by mouth 2 times a day. Indications: Thromboembolism secondary to Atrial Fibrillation  Dose: 2.5 mg     atorvastatin 80 MG tablet  Commonly known as: LIPITOR   Take 1 Tablet by mouth every evening.  Dose: 80 mg     clopidogrel 75 MG Tabs  Start taking on: August 14, 2021  Commonly known as: PLAVIX   [place] 1 tableta por vía oral diariamente.  (Take 1 Tablet by mouth every day.)  Dose: 75 mg        CHANGE how you take these medications      Instructions   carvedilol 25 MG Tabs  What changed: See the new instructions.  Commonly known as: COREG   TOME FERNANDO TABLETA DOS VECES AL DESMOND CON LAS COMIDAS     ROPINIRole 1 MG Tabs  What changed: how much to take  Commonly known as: REQUIP   TOME FERNANDO TABLETA DOS VECES AL DESMOND        CONTINUE taking these medications      Instructions   furosemide 80 MG Tabs  Commonly known as: LASIX   Take 80 mg by mouth in the morning, at noon, and at bedtime.  Dose: 80 mg     gabapentin 300 MG Caps  Commonly known as: NEURONTIN   Take 600 mg by mouth at bedtime.  Dose: 600 mg     hydrALAZINE 100 MG tablet  Commonly known as: APRESOLINE   Take 1 tablet by mouth 2 times a day.  Dose: 100 mg     levothyroxine 50 MCG Tabs  Commonly known as: SYNTHROID   Take 1 Tab by mouth Every morning on an empty stomach.  Dose: 50 mcg     lisinopril 40 MG tablet  Commonly known as: PRINIVIL   Take 1 tablet by mouth every day.  Dose: 40 mg     ondansetron 4 MG Tbdp  Commonly known as: Zofran ODT   Take 1 tablet by mouth every 8 hours as needed.  Dose: 4 mg     pioglitazone 30 MG Tabs  Commonly known as: ACTOS   Take 1 Tab by mouth every day.  Dose: 30 mg     sevelamer carbonate 800 MG Tabs tablet  Commonly known as: RENVELA   Take 1 tablet by mouth 3 times a day with meals.  Dose: 800 mg         STOP taking these medications    Aspirin Low Dose 81 MG Chew chewable tablet  Generic drug: aspirin     ibuprofen 800 MG Tabs  Commonly known as: MOTRIN     methocarbamol 500 MG Tabs  Commonly known as: ROBAXIN            Allergies  No Known Allergies    DIET  No orders of the defined types were placed in this encounter.      ACTIVITY  As tolerated.  Weight bearing as tolerated    CONSULTATIONS  Cardiology   Nephrology   Heme/Onc     PROCEDURES    Regadenoson Nuclear Stress Test- (8/12/21)   Normal left ventricular size, ejection fraction, and wall motion.   No evidence of significant jeopardized viable myocardium or prior myocardial    infarction.

## 2021-08-13 NOTE — PROGRESS NOTES
Received bedside report from RN, pt care assumed @ 0700, VSS, pt assessment complete. RA. Pt AAOx4, no c/o pain at this time. Tele box in place. No signs of acute distress noted at this time. POC discussed with pt and verbalizes no questions. Pt denies any additional needs at this time. Bed in lowest position, pt educated on fall risk and verbalized understanding, call light within reach, hourly rounding initiated.

## 2021-08-13 NOTE — PROGRESS NOTES
Cardiology Follow Up Progress Note    Date of Service  8/13/2021    Attending Physician  Wily Guerrero M.D.    Presented with chest pain, EKG showed A. fib which she self converted without intervention      PMH: Coronary angiography 6/8/2021 demonstrated widely patent stent in mid RCA, otherwise mild nonobstructive CAD, end-stage renal disease on hemodialysis, type 2 diabetes, CAD status post RCA stent, hypertension, dyslipidemia, renal cell carcinoma status post left nephrectomy    Interim Events    Telemetry-SB  No recurrent A. fib   On low-dose Eliquis in the setting of pancytopenia, in addition patient is on antiplatelet therapy with Plavix.  Underwent dialysis this morning.  Bone marrow biopsy canceled, plan for outpatient procedure.  MPI pending, if normal stable from cardiac stand to be discharged and be followed as an outpatient.  Review of Systems      Via   Review of Systems   Respiratory: Negative for apnea, cough, choking, chest tightness, shortness of breath, wheezing and stridor.        Vital signs in last 24 hours  Temp:  [36 °C (96.8 °F)-36.7 °C (98 °F)] 36.2 °C (97.1 °F)  Pulse:  [52-56] 54  Resp:  [16-18] 18  BP: (113-139)/(42-60) 135/48  SpO2:  [90 %-94 %] 90 %    Physical Exam  Physical Exam  Cardiovascular:      Rate and Rhythm: Bradycardia present.      Pulses: Normal pulses.   Skin:     General: Skin is warm.   Neurological:      Mental Status: She is alert. Mental status is at baseline.   Psychiatric:         Mood and Affect: Mood normal.         Lab Review  Lab Results   Component Value Date/Time    WBC 2.2 (LL) 08/13/2021 03:53 AM    RBC 3.14 (L) 08/13/2021 03:53 AM    HEMOGLOBIN 9.9 (L) 08/13/2021 03:53 AM    HEMATOCRIT 29.7 (L) 08/13/2021 03:53 AM    MCV 94.6 08/13/2021 03:53 AM    MCH 31.5 08/13/2021 03:53 AM    MCHC 33.3 (L) 08/13/2021 03:53 AM    MPV 10.8 08/13/2021 03:53 AM      Lab Results   Component Value Date/Time    SODIUM 127 (L) 08/13/2021  03:53 AM    POTASSIUM 5.2 08/13/2021 03:53 AM    CHLORIDE 92 (L) 08/13/2021 03:53 AM    CO2 22 08/13/2021 03:53 AM    GLUCOSE 75 08/13/2021 03:53 AM    BUN 42 (H) 08/13/2021 03:53 AM    CREATININE 8.77 (HH) 08/13/2021 03:53 AM    BUNCREATRAT 8 (L) 01/28/2021 07:00 AM      Lab Results   Component Value Date/Time    ASTSGOT 13 08/13/2021 03:53 AM    ALTSGPT 13 08/13/2021 03:53 AM     Lab Results   Component Value Date/Time    CHOLSTRLTOT 125 09/29/2020 10:56 AM    LDL 54 09/29/2020 10:56 AM    HDL 48 09/29/2020 10:56 AM    TRIGLYCERIDE 113 09/29/2020 10:56 AM    TROPONINT 437 (H) 08/12/2021 06:18 AM       Recent Labs     08/11/21  0934   NTPROBNP [id number]*       Cardiac Imaging and Procedures Review  EKG: Atrial fibrillation, rate 134    Echocardiogram:   6/4/2021  LVEF 70 to 75%  No valvular abnormalities    Cardiac Catheterization:   6/8/2021  Nonobstructive CAD  Widely patent previously placed RCA stents  LVEF 70%  LVEDP 22 mmHg    Imaging  Chest X-Ray:    1.  Cardiomegaly  2.  Atherosclerosis  3.  Interstitial pulmonary edema, less likely atypical pneumonia      Stress Test:    10/20/2020  Normal LV perfusion with no fixed or reversible defect    Assessment/Plan      New onset A. fib RVR  Self converted without intervention  -Currently SB/SR  -On low-dose apixaban in the setting of pancytopenia, in addition to antiplatelet therapy with Plavix.  - close follow-up with the Coumadin clinic, appointment arranged for following Tuesday.  -RADHA VASC (age, hypertension, diabetes, vascular disease)    End-stage renal disease  Dialysis since 2014  [facility] schedule  -Nephrology following  -Underwent dialysis this morning  -Transplant candidate    Elevated troponins  Troponin peaked at 395  -Recent [facility] 6/21 demonstrated widely patent stents to RCA.  -Continue with atorvastatin, carvedilol, Plavix.  -Plan for MPI this afternoon.  -No aspirin on discharge being on Plavix and apixaban.  -Repeat limited echo 8/12/2021, reassuring, normal  LV wall thickness, normal LV systolic function, LVEF 65%, no wall motion abnormalities.    Hypertension  -Amlodipine 10, carvedilol 25 BID, hydralazine 100 BID, lisinopril 40  -/55    Stable for DC from cardiac stand if MPI normal.  Follow-up with our cardiology office on 8/31/2021 at 9:15 AM.    Thank you for allowing me to participate in the care of this patient.      Please contact me with any questions.    DALE Dow.   Cardiologist, [facility]  [phone]

## 2021-08-13 NOTE — CARE PLAN
The patient is Stable - Low risk of patient condition declining or worsening    Shift Goals: Dialysis, Bone Marrow biopsy, Stress Test  Clinical Goals:     Progress made toward(s) clinical / shift goals:        Problem: Knowledge Deficit - Standard  Goal: Patient and family/care givers will demonstrate understanding of plan of care, disease process/condition, diagnostic tests and medications  Outcome: Progressing     Problem: Communication  Goal: The ability to communicate needs accurately and effectively will improve  Outcome: Progressing     Problem: Self Care  Goal: Patient will have the ability to perform ADLs independently or with assistance (bathe, groom, dress, toilet and feed)  Outcome: Progressing       Patient is not progressing towards the following goals:

## 2021-08-14 NOTE — DISCHARGE PLANNING
Meds-to-Beds: Discharge prescription orders listed below delivered to patient's bedside. TRAVIS Cunningham notified. Patient counseled.      Reviewed signs and symptoms of bleeding and when to seek medical attention. Reviewed potential drug-drug interactions.    Current Outpatient Medications   Medication Sig Dispense Refill   • [START ON 8/14/2021] amLODIPine (NORVASC) 10 MG Tab Take 1 Tablet by mouth every day. 30 Tablet 2   • atorvastatin (LIPITOR) 80 MG tablet Take 1 Tablet by mouth every evening. 30 Tablet 2   • [START ON 8/14/2021] clopidogrel (PLAVIX) 75 MG Tab Take 1 Tablet by mouth every day. 30 Tablet 2        Veronica Del Rio, PharmD

## 2021-08-14 NOTE — PROGRESS NOTES
Hemodialysis ordered by Dr. Villar. Treatment started at 0818 and ended at 1118. Pt stable, vss, no c/o post tx. See flow sheets for details. Net UF 2.0 L. Reported to SUSAN Horan RN. Lt ua avf dressing clean, dry, intact.

## 2021-08-14 NOTE — PROGRESS NOTES
Discharge instructions complete. IV removed. Telebox removed and monitor room notified. All asked questions answered. Pt taken down to relative for pickup. Safe discharge complete.

## 2021-08-16 ENCOUNTER — PATIENT OUTREACH (OUTPATIENT)
Dept: HEALTH INFORMATION MANAGEMENT | Facility: OTHER | Age: 71
End: 2021-08-16

## 2021-08-16 ENCOUNTER — DOCUMENTATION (OUTPATIENT)
Dept: VASCULAR LAB | Facility: MEDICAL CENTER | Age: 71
End: 2021-08-16

## 2021-08-16 NOTE — PROGRESS NOTES
[facility] Pharmacotherapy [facility]    Received anticoag referral for Eliquis due to AF from VASQUEZ Dow on 8/12/21    Called pt to schedule appt to establish care - no answer. LVM.    Insurance: Medicare/Medicaid  PCP: [facility]  Locations to be seen: [address]    [facility] at [phone], fax [phone]    Utilized translation services for this encounter.  Language Line - [phone]  Cost Center ID - [id number]   Tona, ID # [id number]    Casper Matias, DominiqueD

## 2021-08-18 ENCOUNTER — OFFICE VISIT (OUTPATIENT)
Dept: MEDICAL GROUP | Facility: MEDICAL CENTER | Age: 71
End: 2021-08-18
Payer: MEDICARE

## 2021-08-18 ENCOUNTER — DOCUMENTATION (OUTPATIENT)
Dept: VASCULAR LAB | Facility: MEDICAL CENTER | Age: 71
End: 2021-08-18

## 2021-08-18 VITALS
TEMPERATURE: 97 F | OXYGEN SATURATION: 98 % | HEART RATE: 64 BPM | WEIGHT: 141 LBS | SYSTOLIC BLOOD PRESSURE: 144 MMHG | BODY MASS INDEX: 27.68 KG/M2 | DIASTOLIC BLOOD PRESSURE: 80 MMHG | HEIGHT: 60 IN

## 2021-08-18 DIAGNOSIS — Z09 HOSPITAL DISCHARGE FOLLOW-UP: ICD-10-CM

## 2021-08-18 DIAGNOSIS — Z99.2 ESRD (END STAGE RENAL DISEASE) ON DIALYSIS (HCC): ICD-10-CM

## 2021-08-18 DIAGNOSIS — N18.6 ESRD (END STAGE RENAL DISEASE) ON DIALYSIS (HCC): ICD-10-CM

## 2021-08-18 DIAGNOSIS — I48.91 NEW ONSET ATRIAL FIBRILLATION (HCC): ICD-10-CM

## 2021-08-18 PROCEDURE — 99214 OFFICE O/P EST MOD 30 MIN: CPT | Performed by: NURSE PRACTITIONER

## 2021-08-18 RX ORDER — GABAPENTIN 300 MG/1
300 CAPSULE ORAL
Qty: 90 CAPSULE | COMMUNITY
Start: 2021-08-18 | End: 2021-11-10

## 2021-08-18 RX ORDER — SEVELAMER CARBONATE 800 MG/1
800 TABLET, FILM COATED ORAL
Qty: 270 TABLET | Refills: 3 | Status: SHIPPED | OUTPATIENT
Start: 2021-08-18 | End: 2022-08-24

## 2021-08-18 ASSESSMENT — FIBROSIS 4 INDEX: FIB4 SCORE: 2.08

## 2021-08-18 NOTE — PROGRESS NOTES
[facility]    Utilized translation services for this encounter.  Language Line - [phone]  Cost Center ID - [id number]   Becca, ID # [id number]     Received anticoag referral for Eliquis due to AF from VASQUEZ Dow on 8/12/21     2nd call  [facility] to establish care.     Insurance: Medicare/Medicaid  PCP: [facility]  Locations to be seen: [facility] at [phone], fax [phone]     Elissa Mendiola, DominiqueD

## 2021-08-18 NOTE — PROGRESS NOTES
CHW Ilya reached out to pt via TC x3 to introduce CCM services & f/u after d/c. No answer, left VM. CHW also [facility] her [facility] Cat[facility] but also no answer, left VM. Since there have been three attempts, CHW will no longer reach out and remove from CCM list. If pt returns this CHW's call, CHW will open new encounter.

## 2021-08-18 NOTE — PROGRESS NOTES
Subjective:   Tere Gallegos is a 70 y.o. female here today for hospital discharge follow up    Hospital discharge follow-up  Admitted 8/11-8/13 70 y.o for complaint of chest pain while at her dialysis session that day. Patient received aspirin, fentanyl, nitro and zofran in the ambulance. On presentation to the ED, the patient was found to be in Afib w/ RVR up to rate in 140s,  Afib spontaneously converted without intervention. Initial troponin was at 64 which has been baseline for her. Repeat Troponin elevated up to 164. Initial EKG shows Afib w/ rate of 124, with lateral and inferior ST depression. Repeat EKG after spontaneous conversion shows sinus bradycardia and still persistent ST depression in lateral leads. Patient recently underwent left heart cath as part of her transplant evaluation in 6/21, no obstructing stenosis found. Dr. Simpson, cardiology, was consulted and recommended anticoagulation and trending troponin. Patient was admitted to telemetry for monitoring, troponin 165, 256, 395, 437. Patient remained in SR and had no further chest pain during the admission. She underwent nuclear stress testing on 8/13 which found no evidence of acute ischemia.      Patient received dialysis on the morning of discharge (normal schedule) without complications. Evaluation for chronic neutropenia initiated while inpatient as this was holding up her transplant. Patient will complete outpatient bone marrow biopsy and follow up with Dr. Prasad to complete workup for suspected MDS.      Today she is feeling well, had dialysis this morning without incident. Has not had any recurrences of chest pain or palpitations.        Current medicines (including changes today)  Current Outpatient Medications   Medication Sig Dispense Refill   • sevelamer carbonate (RENVELA) 800 MG Tab tablet Take 1 Tablet by mouth 3 times a day with meals. 270 Tablet 3   • gabapentin (NEURONTIN) 300 MG Cap Take 1 Capsule by mouth at bedtime.  90 Capsule    • amLODIPine (NORVASC) 10 MG Tab Take 1 Tablet by mouth every day. 30 Tablet 2   • apixaban (ELIQUIS) 2.5mg Tab Take 1 Tablet by mouth 2 times a day. Indications: Thromboembolism secondary to Atrial Fibrillation 60 Tablet 2   • atorvastatin (LIPITOR) 80 MG tablet Take 1 Tablet by mouth every evening. 30 Tablet 2   • clopidogrel (PLAVIX) 75 MG Tab Take 1 Tablet by mouth every day. 30 Tablet 2   • furosemide (LASIX) 80 MG Tab Take 80 mg by mouth in the morning, at noon, and at bedtime.     • carvedilol (COREG) 25 MG Tab TOME FERNANDO TABLETA DOS VECES AL DESMOND CON LAS COMIDAS (Patient taking differently: Take 25 mg by mouth 2 times a day with meals.) 180 tablet 3   • lisinopril (PRINIVIL) 40 MG tablet Take 1 tablet by mouth every day. 90 tablet 3   • hydrALAZINE (APRESOLINE) 100 MG tablet Take 1 tablet by mouth 2 times a day. 180 tablet 3   • pioglitazone (ACTOS) 30 MG Tab Take 1 Tab by mouth every day. 90 Tab 3   • levothyroxine (SYNTHROID) 50 MCG Tab Take 1 Tab by mouth Every morning on an empty stomach. 90 Tab 3   • ondansetron (ZOFRAN ODT) 4 MG TABLET DISPERSIBLE Take 1 tablet by mouth every 8 hours as needed. 10 tablet 0   • ROPINIRole (REQUIP) 1 MG Tab TOME FERNANDO TABLETA DOS VECES AL DESMOND (Patient taking differently: Take 1 mg by mouth 2 times a day.) 180 tablet 1     No current facility-administered medications for this visit.     She  has a past medical history of Acquired hypothyroidism (5/4/2020), CAD (coronary artery disease), Chronic diastolic heart failure (HCC) (5/4/2020), Coronary artery disease due to lipid rich plaque, Dental disorder, Diabetes ([facility]), ESRD (end stage renal disease) on dialysis ([facility]) (5/4/2020), Hemodialysis patient ([facility]), Hyperlipidemia, Hypertension, Kidney transplant candidate, Presence of drug-eluting stent in right coronary artery, QT prolongation (1/22/2020), RLS (restless legs syndrome) (8/5/2016), and Transaminitis (12/22/2018).    ROS   No chest pain, no shortness of  breath, no abdominal pain  Positive ROS as per HPI.  All other systems reviewed and are negative.     Objective:     /80 (BP Location: Right arm, Patient Position: Sitting, BP Cuff Size: Adult)   Pulse 64   Temp 36.1 °C (97 °F) (Temporal)   Ht 1.524 m (5')   Wt 64 kg (141 lb)   SpO2 98%  Body mass index is 27.54 kg/m².     Physical Exam:  Constitutional: Alert, no distress.  Skin: Warm, dry, good turgor, no rashes in visible areas.  Eye: Equal, round and reactive, conjunctiva clear, lids normal.  ENMT:  Mask in place  Respiratory: Unlabored respiratory effort, lungs clear to auscultation, no wheezes, no ronchi.  Cardiovascular: Normal S1, S2, no murmur, no edema.  Psych: Alert and oriented x3, normal affect and mood.        Assessment and Plan:   The following treatment plan was discussed    1. Hospital discharge follow-up  Stable post discharge  Now on eliquis for anticoagulation, tolerating well    2. New onset atrial fibrillation (HCC)  Stable  NSR today in office  Taking eliquis and carvedilol BID as prescribed    3. ESRD (end stage renal disease) on dialysis (HCC)  Stable  Not taking Renvela as prescribed, did not have this in her medication bag. Refilled today.   Info given to call Dr. Prasad's office to find out location and time of bone marrow biopsy on 8/25.  - sevelamer carbonate (RENVELA) 800 MG Tab tablet; Take 1 Tablet by mouth 3 times a day with meals.  Dispense: 270 Tablet; Refill: 3      Followup: Return in about 6 months (around 2/18/2022).    I have placed the below orders and discussed them with an approved delegating provider. The MA is performing the below orders under the direction of Dr. Priest

## 2021-08-18 NOTE — ASSESSMENT & PLAN NOTE
Admitted 8/11-8/13 70 y.o for complaint of chest pain while at her dialysis session that day. Patient received aspirin, fentanyl, nitro and zofran in the ambulance. On presentation to the ED, the patient was found to be in Afib w/ RVR up to rate in 140s,  Afib spontaneously converted without intervention. Initial troponin was at 64 which has been baseline for her. Repeat Troponin elevated up to 164. Initial EKG shows Afib w/ rate of 124, with lateral and inferior ST depression. Repeat EKG after spontaneous conversion shows sinus bradycardia and still persistent ST depression in lateral leads. Patient recently underwent left heart cath as part of her transplant evaluation in 6/21, no obstructing stenosis found. Dr. Simpson, cardiology, was consulted and recommended anticoagulation and trending troponin. Patient was admitted to telemetry for monitoring, troponin 165, 256, 395, 437. Patient remained in SR and had no further chest pain during the admission. She underwent nuclear stress testing on 8/13 which found no evidence of acute ischemia.      Patient received dialysis on the morning of discharge (normal schedule) without complications. Evaluation for chronic neutropenia initiated while inpatient as this was holding up her transplant. Patient will complete outpatient bone marrow biopsy and follow up with Dr. Prasad to complete workup for suspected MDS.      Today she is feeling well, had dialysis this morning without incident. Has not had any recurrences of chest pain or palpitations.

## 2021-08-23 ENCOUNTER — DOCUMENTATION (OUTPATIENT)
Dept: VASCULAR LAB | Facility: MEDICAL CENTER | Age: 71
End: 2021-08-23

## 2021-08-23 NOTE — PROGRESS NOTES
[facility]     Utilized translation services for this encounter.  Language Line - [phone]  Cost Center ID - [id number]   Alia, ID # [id number]     Received anticoag referral for Eliquis due to AF from VASQUEZ Dow on 8/12/21     3rd call  [facility] to establish care. Requested she call the clinic back to schedule appt.      Insurance: Medicare/Medicaid  PCP: [facility]  Locations to be seen: [facility] at [phone], fax [phone]      Jay FoxD

## 2021-08-26 ENCOUNTER — DOCUMENTATION (OUTPATIENT)
Dept: VASCULAR LAB | Facility: MEDICAL CENTER | Age: 71
End: 2021-08-26

## 2021-08-26 NOTE — PROGRESS NOTES
[facility] for [facility] and Pharmac[facility]    Utilized translation services for this encounter.  Language Line - [phone]  Cost Center ID - [id number]   Stacy, ID # [id number]    Received anticoagulation referral for Eliquis due to AF from Dr. Gaurav Fairchild on 8/12/21    Unable to contact pt at this time.     Left voicemail for pt to contact clinic to establish care with anticoagulation clinic. Will attempt to contact at a later date.       Insurance: Medicare/Medicaid  PCP: [facility]  Locations to be seen: [facility] at [phone], fax [phone]    Daron Esposito, PharmD

## 2021-08-31 ENCOUNTER — OFFICE VISIT (OUTPATIENT)
Dept: CARDIOLOGY | Facility: MEDICAL CENTER | Age: 71
End: 2021-08-31
Payer: MEDICARE

## 2021-08-31 VITALS
BODY MASS INDEX: 28.82 KG/M2 | WEIGHT: 146.8 LBS | HEIGHT: 60 IN | DIASTOLIC BLOOD PRESSURE: 62 MMHG | HEART RATE: 64 BPM | RESPIRATION RATE: 16 BRPM | OXYGEN SATURATION: 97 % | SYSTOLIC BLOOD PRESSURE: 140 MMHG

## 2021-08-31 DIAGNOSIS — E11.22 CONTROLLED TYPE 2 DIABETES MELLITUS WITH CHRONIC KIDNEY DISEASE ON CHRONIC DIALYSIS, WITHOUT LONG-TERM CURRENT USE OF INSULIN (HCC): ICD-10-CM

## 2021-08-31 DIAGNOSIS — Z99.2 CONTROLLED TYPE 2 DIABETES MELLITUS WITH CHRONIC KIDNEY DISEASE ON CHRONIC DIALYSIS, WITHOUT LONG-TERM CURRENT USE OF INSULIN (HCC): ICD-10-CM

## 2021-08-31 DIAGNOSIS — I25.83 CORONARY ARTERY DISEASE DUE TO LIPID RICH PLAQUE: Chronic | ICD-10-CM

## 2021-08-31 DIAGNOSIS — I10 ESSENTIAL HYPERTENSION: ICD-10-CM

## 2021-08-31 DIAGNOSIS — Z95.5 PRESENCE OF DRUG-ELUTING STENT IN RIGHT CORONARY ARTERY: Chronic | ICD-10-CM

## 2021-08-31 DIAGNOSIS — I25.10 CORONARY ARTERY DISEASE DUE TO LIPID RICH PLAQUE: Chronic | ICD-10-CM

## 2021-08-31 DIAGNOSIS — N18.6 CONTROLLED TYPE 2 DIABETES MELLITUS WITH CHRONIC KIDNEY DISEASE ON CHRONIC DIALYSIS, WITHOUT LONG-TERM CURRENT USE OF INSULIN (HCC): ICD-10-CM

## 2021-08-31 PROBLEM — I48.0 PAROXYSMAL A-FIB (HCC): Status: ACTIVE | Noted: 2021-08-11

## 2021-08-31 PROCEDURE — 99213 OFFICE O/P EST LOW 20 MIN: CPT | Performed by: PHYSICIAN ASSISTANT

## 2021-08-31 ASSESSMENT — FIBROSIS 4 INDEX: FIB4 SCORE: 2.08

## 2021-08-31 NOTE — PROGRESS NOTES
Cardiology Clinic Follow-up Note    Date of note:    8/31/2021  Primary Care Provider: ANGELITA Concepcion    Name:             Tere Gallegos  YOB: 1950  MRN:               [id number]    CC: Essential hypertension     Primary Cardiologist: Dr. Borjas     Patient HPI:   Tere Gallegos is a 70 y.o. female with PMH including CAD with NOEMI x 2 to the mid to the RCA in 2016, ESRD on HD M, W, F since 2014 (LUE AV fistula) followed by Dr. Villar?, resistant hypertension, RCC s/p L nephrectomy approx 17 years ago.      Interim History:  Ms. Kenyetta Gallegos was last seen in this cardiology office by me in 6/2021.    She is planning to have a kidney transplant through [facility] in .  She recently saw Dr. Misael Whitt, Cardiologist, Dr. Nickolas Garay, nephrologist, Dr. Abhijeet redd on 5/26/2021.   Recommendations were to have updated echocardiogram and coronary angiogram which she has recently completed over the summer.     She was recently diagnosed with PAF, converted to SR spontaneously upon admission.  Started on low dose Eliquis.     She denies further issues, no palpitations.  No more CP.      Review of Systems   Constitutional: Negative for decreased appetite, diaphoresis, fever and malaise/fatigue.   Eyes: Negative for blurred vision.   Cardiovascular: Negative for chest pain, dyspnea on exertion, leg swelling, near-syncope, orthopnea, palpitations and syncope.   Respiratory: Negative for shortness of breath and snoring.    Hematologic/Lymphatic: Negative for bleeding problem. Does not bruise/bleed easily.   Skin: Negative for rash.   Musculoskeletal: Negative for joint pain and myalgias.   Gastrointestinal: Negative for bloating, abdominal pain, nausea and vomiting.   Genitourinary: Negative for frequency.   Neurological: Negative for dizziness and weakness.        Past medical history, social history and family history reviewed.  No changes other than what  is outlined in HPI.    Current Outpatient Medications   Medication Sig Dispense Refill   • sevelamer carbonate (RENVELA) 800 MG Tab tablet Take 1 Tablet by mouth 3 times a day with meals. 270 Tablet 3   • gabapentin (NEURONTIN) 300 MG Cap Take 1 Capsule by mouth at bedtime. 90 Capsule    • amLODIPine (NORVASC) 10 MG Tab Take 1 Tablet by mouth every day. 30 Tablet 2   • apixaban (ELIQUIS) 2.5mg Tab Take 1 Tablet by mouth 2 times a day. Indications: Thromboembolism secondary to Atrial Fibrillation 60 Tablet 2   • atorvastatin (LIPITOR) 80 MG tablet Take 1 Tablet by mouth every evening. 30 Tablet 2   • clopidogrel (PLAVIX) 75 MG Tab Take 1 Tablet by mouth every day. 30 Tablet 2   • furosemide (LASIX) 80 MG Tab Take 80 mg by mouth in the morning, at noon, and at bedtime.     • ondansetron (ZOFRAN ODT) 4 MG TABLET DISPERSIBLE Take 1 tablet by mouth every 8 hours as needed. 10 tablet 0   • carvedilol (COREG) 25 MG Tab TOME FERNANDO TABLETA DOS VECES AL DESMOND CON LAS COMIDAS (Patient taking differently: Take 25 mg by mouth 2 times a day with meals.) 180 tablet 3   • ROPINIRole (REQUIP) 1 MG Tab TOME FERNANDO TABLETA DOS VECES AL DESMOND (Patient taking differently: Take 1 mg by mouth 2 times a day.) 180 tablet 1   • lisinopril (PRINIVIL) 40 MG tablet Take 1 tablet by mouth every day. 90 tablet 3   • hydrALAZINE (APRESOLINE) 100 MG tablet Take 1 tablet by mouth 2 times a day. 180 tablet 3   • pioglitazone (ACTOS) 30 MG Tab Take 1 Tab by mouth every day. 90 Tab 3   • levothyroxine (SYNTHROID) 50 MCG Tab Take 1 Tab by mouth Every morning on an empty stomach. 90 Tab 3     No current facility-administered medications for this visit.       No Known Allergies      Physical Exam:  Ambulatory Vitals  There were no vitals taken for this visit.   BP Readings from Last 4 Encounters:   08/18/21 144/80   08/13/21 146/54   08/09/21 (!) 177/59   08/03/21 114/58       Weight/BMI: There is no height or weight on file to calculate BMI.  Wt Readings from  Last 4 Encounters:   08/18/21 64 kg (141 lb)   08/12/21 66.1 kg (145 lb 11.6 oz)   08/09/21 65.1 kg (143 lb 8.3 oz)   08/03/21 64.6 kg (142 lb 6.4 oz)       General: no apparent distress  Lungs: normal effort, without crackles, no wheezing or rhonchi.  Heart: normal rate, regular rhythm, no murmur, no rub  Ext:  no lower extremity edema.  LUE AV fistula present.  Abdomen: soft, non tender, non distended,  Neurological: No focal deficits, no facial asymmetry.  Normal speech.  Psychiatric: Appropriate affect, alert and oriented x 3.   Skin: Warm extremities, no rash.      Lab Data Review:  Lab Results   Component Value Date/Time    CHOLSTRLTOT 125 09/29/2020 10:56 AM    LDL 54 09/29/2020 10:56 AM    HDL 48 09/29/2020 10:56 AM    TRIGLYCERIDE 113 09/29/2020 10:56 AM       Lab Results   Component Value Date/Time    SODIUM 130 (L) 08/13/2021 05:18 PM    POTASSIUM 4.0 08/13/2021 05:18 PM    CHLORIDE 90 (L) 08/13/2021 05:18 PM    CO2 26 08/13/2021 05:18 PM    GLUCOSE 204 (H) 08/13/2021 05:18 PM    BUN 18 08/13/2021 05:18 PM    CREATININE 4.95 (H) 08/13/2021 05:18 PM    BUNCREATRAT 8 (L) 01/28/2021 07:00 AM     Lab Results   Component Value Date/Time    ALKPHOSPHAT 84 08/13/2021 03:53 AM    ASTSGOT 13 08/13/2021 03:53 AM    ALTSGPT 13 08/13/2021 03:53 AM    TBILIRUBIN 0.2 08/13/2021 03:53 AM      Lab Results   Component Value Date/Time    WBC 2.2 (LL) 08/13/2021 03:53 AM         Cardiac Imaging and Procedures Review:      Echo 8/12/2021:  CONCLUSIONS  Normal left ventricular size, wall thickness, and systolic function.  Mildly dilated left atrium.  Mitral annular calcification.  Aortic sclerosis without stenosis.  No pericardial effusion seen.    Echo 1/23/20:  CONCLUSIONS  Compared to the images of the study done 12/23/18, RVSP previously   severely elevated, could not be assessed on the study, otherwise no   significant change.  Left ventricular ejection fraction is visually estimated to be 60%.  Mild concentric left  ventricular hypertrophy.  Grade II diastolic dysfunction.  Moderately dilated left atrium.  Mild mitral regurgitation.  Unable to estimate pulmonary artery pressure due to an inadequate   tricuspid regurgitant jet.  Trivial/physiologic pericardial fluid.    Stress Test 10/20/20:  Report   NUCLEAR IMAGING INTERPRETATION   Normal left ventricular perfusion with no fixed or reversible defects.    Normal left ventricular wall motion, with EF of 68%.    ECG INTERPRETATION   Negative stress ECG for ischemia.    [facility] 9/7/2016:  1.  LV was not entered.  Blood pressure was as mentioned above quite elevated   At the beginning of the case.  We did give her several doses of intracoronary   nitroglycerin throughout the case.  Blood pressures towards the end of the   case were in the 120-130 systolic.     2.  Single-vessel coronary artery disease.       The left main is a large caliber vessel that is angiographically free of disease.   It gave off a large left anterior descending artery and codominant left circumflex artery.   Left anterior descending artery is around 3 mm in diameter proximally.  It     gave off 2 medium-sized diagonal branches in proximal and mid segment.  It   has minor irregularity, but no significant disease seen.  Antegrade flow was   normal.  Left anterior descending artery provides some collaterals through   septal arcade and the posterior descending artery.       Left circumflex artery is a codominant system, is quite large, probably   about 4 mm in diameter proximally.  It gives rise to 2 or 3 large obtuse   marginal branches in the mid segment and several small medium-sized obtuse   marginal branches distally.  There is no significant disease in the left   circumflex artery or its major branches.     The right coronary artery is a medium sized vessel around 2.5 mm in diameter.    Proximally it is occluded after it give off a conus branch about 2 cm or so   from its origin with small bridging collateral  and probable a micro channel into a   diffusely diseased [id number] right coronary artery and posterior   descending artery.  There was no significant PLV originating from the right   coronary artery.     After intervention, the stented segment in mid right coronary artery has no residual   stenosis with IRIS-3 antegrade flow into moderately diseased distal right   coronary artery and posterior descending artery.    PostOp Diagnosis: s/p NOEMI 2.5 and 2.25 mm Synergy stents to mid RCA with 0% residual in stented segment, IRIS III    [facility] 2021:  POSTOPERATIVE DIAGNOSIS:  1.  Nonobstructive coronary artery disease.  2.  Widely patent previously placed RCA stents  3.  LVEF 70%, LVEDP 22 mmHg    Stress test 2021:  NUCLEAR IMAGING INTERPRETATION   Normal left ventricular size, ejection fraction, and wall motion.   No evidence of significant jeopardized viable myocardium or prior myocardial    infarction.   ECG INTERPRETATION   Nondiagnostic ECG portion of a regadenoson nuclear stress test.    Assessment and Clinical Decision Makin   Essential hypertension   -    BP well controlled on current med regimen including coreg 25 BID, hydral 100 BID, lisin 40 daily    2   CAD s/p NOEMI to the RCA   -    [facility] 2021 with non obstructive CAD, patent RCA stent  -    Echo stable 2021.  -    Continue Plavix  -    Continue statin and BB    3   ESRD   -    M, W, F at [facility]  -    I think she follows with Dr. Villar  -    Plans for kidney transplant in   -    CV workup completed this summer, transplant team should be able to access our records via [facility] Everywhere.    4   Diabetes mellitus type II, A1C 5.5    5   Dyslipidemia   -    Lipids at goal, continue atrova 20 daily.    6   Paroxysmal atrial fibrillation  -    Admitted with such 21, spontaneously converted  -    Continue Plavix and low dose Eliquis    7   Pancytopenia    Patient may follow up Dr. Borjas in 6 months or as needed.    Aster  ZEENAT Metz     [facility]

## 2021-09-02 ENCOUNTER — DOCUMENTATION (OUTPATIENT)
Dept: VASCULAR LAB | Facility: MEDICAL CENTER | Age: 71
End: 2021-09-02

## 2021-09-02 NOTE — LETTER
Tere Gallegos  [address]  [address]  [facility] [facility] [place] [address]    09/02/21    Dear Tere Gallegos ,    We have been unsuccessful in our attempts to contact you regarding your Anticoagulation Service appointments. Eliquis is a potent blood-thinning agent that requires monitoring to ensure that the dosage is correct for your body.  If it isn't, you could develop serious, sometimes life-threatening bleeding problems or life-threatening blood clots or stroke could result.    To monitor you effectively, we need to be able to communicate with you.  This is a requirement to be followed by our Service.       If you repeatedly fail to keep your lab appointments, you are at risk of being discharged from the Anticoagulation Service.    It is extremely important that you contact the clinic as soon as possible to arrange appropriate follow up.  We are open Monday-Friday 8 am until 5 pm.  You may reach our Service at [phone].           Sincerely,           Corona Main, North, BCPS  Clinic Supervisor  [facility]  Outpatient Anticoagulation Service

## 2021-09-02 NOTE — PROGRESS NOTES
[facility] Heart and Vascular Health and Pharmacotherapy Programs     Utilized translation services for this encounter.  Language Line - [phone]  [facility] ID - [id number]   [place] , ID # [id number]     Received anticoagulation referral for Eliquis due to AF from Dr. Gaurav Fairchild on 8/12/21     Unable to contact pt at this time.     Left voicemail for pt to contact clinic to establish care with anticoagulation clinic. Sent first letter.     Insurance: Medicare/Medicaid  PCP: [facility]  Locations to be seen: [facility] Anticoagulation/Pharmacotherapy Clinic at [phone], fax [phone]     Curly Corbett, DominiqueD

## 2021-09-04 NOTE — THERAPY
PT order received; Pt is in no acute skilled PT needs at this time. Per RN and patient she has been up self ambulating within her own room independently. Pt declined therapy services and feels she is close to her functional baseline and feels safe to d/c home once medically clear. Pt encouraged to mobilize with nsg staff while in house to prevent deconditioning.  
0 = swallows foods/liquids without difficulty

## 2021-09-07 ENCOUNTER — DOCUMENTATION (OUTPATIENT)
Dept: VASCULAR LAB | Facility: MEDICAL CENTER | Age: 71
End: 2021-09-07

## 2021-09-07 NOTE — PROGRESS NOTES
[facility] Heart and Vascular Health and Pharmacotherapy Programs    Received anticoagulation referral for Eliquis due to AF from Dr. Gaurav Fairchidl on 8/12/21    Scheduled pt for initial visit on 9/13 @ 2 pm.     Utilized translation services for this encounter.  Language Line - [phone]  [facility] ID - [id number]   [place], ID # [id number]     Insurance: Medicare/Medicaid  PCP: [facility]  Locations to be seen: [facility] Anticoagulation/Pharmacotherapy Clinic at [phone], fax [phone]

## 2021-09-13 ENCOUNTER — ANTICOAGULATION VISIT (OUTPATIENT)
Dept: VASCULAR LAB | Facility: MEDICAL CENTER | Age: 71
End: 2021-09-13
Attending: INTERNAL MEDICINE
Payer: MEDICARE

## 2021-09-13 ENCOUNTER — DOCUMENTATION (OUTPATIENT)
Dept: VASCULAR LAB | Facility: MEDICAL CENTER | Age: 71
End: 2021-09-13

## 2021-09-13 DIAGNOSIS — I48.0 PAROXYSMAL A-FIB (HCC): ICD-10-CM

## 2021-09-13 PROCEDURE — 99213 OFFICE O/P EST LOW 20 MIN: CPT | Performed by: NURSE PRACTITIONER

## 2021-09-13 RX ORDER — PREGABALIN 25 MG/1
25 CAPSULE ORAL 3 TIMES DAILY
COMMUNITY
End: 2021-11-10

## 2021-09-13 NOTE — PROGRESS NOTES
NEW DOAC   .  Anticoagulation Summary  As of 9/13/2021    INR goal:     TTR:  --   INR used for dosing:     Warfarin maintenance plan:  No maintenance plan   Next INR check:     Target end date:  Indefinite    Indications    Paroxysmal A-fib (HCC) [I48.0]             Anticoagulation Episode Summary     INR check location:      Preferred lab:      Send INR reminders to:      Comments:        Anticoagulation Care Providers     Provider Role Specialty Phone number    Gaurav VASQUEZ Fairchild Referring Cardiovascular Disease (Cardiology) [phone]    [facility] Anticoagulation Services Responsible  [phone]        Anticoagulation Patient Findings      PCP: ANGELITA Concepcion  Cardiologist: SAMMI Pagan  Dx: AF  CHADSVASC = 5 (age, sex, htn, dm, vasc disease)  HAS-BLED = 3 (age, ckd, clopidogrel)  Target End Date = indefinite    Patient presents with her son who is translating. She declines phone .    Pt Hx: Patient recently hospitalized for CP, was diagnosed with AF which self converted without intervention. She has a PMH of DM, HTN, HLD, ESRD on dialysis and CAD s/p RCA stent. Also noted to have pancytopenia for which she saw heme. She was started on heparin and switched to Eliquis 2.5 mg BID (low dose per cards due borderline weight, ESRD, low platelets - 123). Currently on clopidogrel 75 mg daily. Per notes she is to have an outpatient bone marrow biopsy. No date scheduled yet. Also may have kidney transplant some time hopefully in the near future.    Labs:  Lab Results   Component Value Date/Time    WBC 2.2 (LL) 08/13/2021 03:53 AM    RBC 3.14 (L) 08/13/2021 03:53 AM    HEMOGLOBIN 9.9 (L) 08/13/2021 03:53 AM    HEMATOCRIT 29.7 (L) 08/13/2021 03:53 AM    MCV 94.6 08/13/2021 03:53 AM    MCH 31.5 08/13/2021 03:53 AM    MCHC 33.3 (L) 08/13/2021 03:53 AM    MPV 10.8 08/13/2021 03:53 AM    NEUTSPOLYS 59.10 08/13/2021 03:53 AM    LYMPHOCYTES 23.90 08/13/2021 03:53 AM    MONOCYTES 13.30 08/13/2021  03:53 AM    EOSINOPHILS 2.30 08/13/2021 03:53 AM    BASOPHILS 0.90 08/13/2021 03:53 AM    ANISOCYTOSIS 1+ 12/22/2018 07:21 PM      Lab Results   Component Value Date/Time    SODIUM 130 (L) 08/13/2021 05:18 PM    POTASSIUM 4.0 08/13/2021 05:18 PM    CHLORIDE 90 (L) 08/13/2021 05:18 PM    CO2 26 08/13/2021 05:18 PM    GLUCOSE 204 (H) 08/13/2021 05:18 PM    BUN 18 08/13/2021 05:18 PM    CREATININE 4.95 (H) 08/13/2021 05:18 PM    BUNCREATRAT 8 (L) 01/28/2021 07:00 AM          Pt is new to Eliquis and new to RCC. Discussed:   · Indication for DOAC therapy.  · Importance of monitoring and compliance.   · Monitoring parameters, signs and symptoms of bleeding or clotting.  · DOAC therapy, side effects, potential DDIs, OTC medications  · Hormonal therapy - to avoid  · Pregnancy - n/a  · DDI (see below)  · Pt on antiplatelet/NSAID therapy Plavix 75mg for CAD with stenting and must be reviewed again with cardiology.  · Lifestyle safety, ie smoking, ETOH, hobby safety, fall safety/prevention  · Procedures for missed doses or suspected missed doses, surgeries/procedures, travel, dental work, any medication changes    Continue taking Eliquis 2.5 mg by mouth twice daily.  Stressed the importance of monitoring for any prolonged bleeding and to be very careful to avoid injuries. Pt and family verbalize understanding. They are aware she will need to stop Eliquis prior to any surgeries.    DOAC affordable = yes    Samples provided: no    Labs to be completed prior to next f/u - CBC    F/U - 2 weeks    DALE Calhoun.      Cc:  Dr Bloch S. Fesharaki APRN

## 2021-09-14 NOTE — PROGRESS NOTES
Initial anticoagulation clinic note and most recent cardiology note reviewed.  Patient has CAD plus PAF with high chads-vasc score    Although her bleeding risk is high, her risk of CV events is high as well.  Pending further recommendations, we will continue with indefinite clopidogrel and low-dose Eliquis as recommended by cardiology    We will defer all further cv care, aside from day to day management of anticoagulation to cardiology.     Michael Bloch, MD  Anticoagulation Clinic    Cc:  MARKELL Metz

## 2021-09-27 ENCOUNTER — ANTICOAGULATION VISIT (OUTPATIENT)
Dept: VASCULAR LAB | Facility: MEDICAL CENTER | Age: 71
End: 2021-09-27
Attending: INTERNAL MEDICINE
Payer: MEDICARE

## 2021-09-27 DIAGNOSIS — I48.0 PAROXYSMAL A-FIB (HCC): Primary | ICD-10-CM

## 2021-09-27 PROCEDURE — 99212 OFFICE O/P EST SF 10 MIN: CPT

## 2021-09-27 NOTE — PROGRESS NOTES
Target end date:Indefinite     Indication: AF     Drug: Eliquis     CHADsVASC = 5    Health Status Since Last Assessment   Patient denies any new relevant medical problems, ED visits or hospitalizations   Patient denies any embolic events (stroke/tia/systemic embolism)    Adherence with DOAC Therapy   Pt has 0 missed any doses in the average week    Bleeding Risk Assessment     Denies Epistaxis   Pt denies any excessive or unusual bleeding/hematomas.  Pt denies any GI bleeds or hematemesis.  Pt denies any concerning daily headache or sub dural hematoma symptoms.     Pt denies any hematuria or abnormal vaginal bleeding.   Latest Hemoglobin 9.9 (8/13/21 - pt asked to repeat labs in next 2-3 weeks)   ETOH overuse denies     Creatinine Clearance/Renal Function     Latest ClCr ~11 mL/min - pt with CKD and receives dialysis    Hepatic function   Latest LFTs WNL (8/13/21 - pt asked to repeat labs in next 2-3 weeks)   Pt denies any history of liver dysfunction      Drug Interactions   Platelets: 123 (8/13/21 - pt asked to repeat labs in next 2-3 weeks)   ASA/other antiplatelets Pt on clopidogrel for CAD per cards - will continue to assess at each follow up. Pt aware of monitoring for excessive bruising/bleeding   NSAID None   Other drug interactions None to note   x Verified no anticonvulsant or azole therapy, education provided for future use.     Examination   Blood Pressure 100/60    60   Symptomatic hypotension Reports episodes of lightheadedness and fatigue ont he day following HD   Significant gait impairment/imbalance/high risk for falls? no    Final Assessment and Recommendations:   Patient appears stable from the anticoagulation standpoint.     Benefits of continued DOAC therapy outweigh risks for this patient   Recommend pt continue with current DOAC therapy Eliquis 5mg twice daily (with dose adjustment)     Other Actions: cmp/ cbc hemogram ordered prior to next visit    Follow up:   Will follow up  with patient 6  months.

## 2021-10-04 ENCOUNTER — HOSPITAL ENCOUNTER (EMERGENCY)
Facility: MEDICAL CENTER | Age: 71
End: 2021-10-04
Attending: EMERGENCY MEDICINE
Payer: MEDICARE

## 2021-10-04 VITALS
HEIGHT: 62 IN | HEART RATE: 57 BPM | BODY MASS INDEX: 30.36 KG/M2 | SYSTOLIC BLOOD PRESSURE: 131 MMHG | OXYGEN SATURATION: 96 % | RESPIRATION RATE: 16 BRPM | TEMPERATURE: 97 F | WEIGHT: 165 LBS | DIASTOLIC BLOOD PRESSURE: 60 MMHG

## 2021-10-04 DIAGNOSIS — R42 VERTIGO: ICD-10-CM

## 2021-10-04 LAB
ALBUMIN SERPL BCP-MCNC: 4.6 G/DL (ref 3.2–4.9)
ALBUMIN/GLOB SERPL: 1.4 G/DL
ALP SERPL-CCNC: 112 U/L (ref 30–99)
ALT SERPL-CCNC: 17 U/L (ref 2–50)
ANION GAP SERPL CALC-SCNC: 16 MMOL/L (ref 7–16)
AST SERPL-CCNC: 20 U/L (ref 12–45)
BASOPHILS # BLD AUTO: 0.5 % (ref 0–1.8)
BASOPHILS # BLD: 0.01 K/UL (ref 0–0.12)
BILIRUB SERPL-MCNC: 0.4 MG/DL (ref 0.1–1.5)
BUN SERPL-MCNC: 14 MG/DL (ref 8–22)
CALCIUM SERPL-MCNC: 9.8 MG/DL (ref 8.4–10.2)
CHLORIDE SERPL-SCNC: 94 MMOL/L (ref 96–112)
CO2 SERPL-SCNC: 29 MMOL/L (ref 20–33)
CREAT SERPL-MCNC: 4.05 MG/DL (ref 0.5–1.4)
EOSINOPHIL # BLD AUTO: 0.01 K/UL (ref 0–0.51)
EOSINOPHIL NFR BLD: 0.5 % (ref 0–6.9)
ERYTHROCYTE [DISTWIDTH] IN BLOOD BY AUTOMATED COUNT: 51 FL (ref 35.9–50)
GLOBULIN SER CALC-MCNC: 3.4 G/DL (ref 1.9–3.5)
GLUCOSE SERPL-MCNC: 98 MG/DL (ref 65–99)
HCT VFR BLD AUTO: 36.6 % (ref 37–47)
HGB BLD-MCNC: 11.8 G/DL (ref 12–16)
IMM GRANULOCYTES # BLD AUTO: 0.01 K/UL (ref 0–0.11)
IMM GRANULOCYTES NFR BLD AUTO: 0.5 % (ref 0–0.9)
LYMPHOCYTES # BLD AUTO: 0.45 K/UL (ref 1–4.8)
LYMPHOCYTES NFR BLD: 20.4 % (ref 22–41)
MCH RBC QN AUTO: 31.1 PG (ref 27–33)
MCHC RBC AUTO-ENTMCNC: 32.2 G/DL (ref 33.6–35)
MCV RBC AUTO: 96.6 FL (ref 81.4–97.8)
MONOCYTES # BLD AUTO: 0.19 K/UL (ref 0–0.85)
MONOCYTES NFR BLD AUTO: 8.6 % (ref 0–13.4)
NEUTROPHILS # BLD AUTO: 1.54 K/UL (ref 2–7.15)
NEUTROPHILS NFR BLD: 69.5 % (ref 44–72)
NRBC # BLD AUTO: 0 K/UL
NRBC BLD-RTO: 0 /100 WBC
PLATELET # BLD AUTO: 121 K/UL (ref 164–446)
PMV BLD AUTO: 10.7 FL (ref 9–12.9)
POTASSIUM SERPL-SCNC: 3.7 MMOL/L (ref 3.6–5.5)
PROT SERPL-MCNC: 8 G/DL (ref 6–8.2)
RBC # BLD AUTO: 3.79 M/UL (ref 4.2–5.4)
SODIUM SERPL-SCNC: 139 MMOL/L (ref 135–145)
WBC # BLD AUTO: 2.2 K/UL (ref 4.8–10.8)

## 2021-10-04 PROCEDURE — 99284 EMERGENCY DEPT VISIT MOD MDM: CPT

## 2021-10-04 PROCEDURE — 85025 COMPLETE CBC W/AUTO DIFF WBC: CPT

## 2021-10-04 PROCEDURE — 700111 HCHG RX REV CODE 636 W/ 250 OVERRIDE (IP): Performed by: EMERGENCY MEDICINE

## 2021-10-04 PROCEDURE — 80053 COMPREHEN METABOLIC PANEL: CPT

## 2021-10-04 PROCEDURE — 96374 THER/PROPH/DIAG INJ IV PUSH: CPT

## 2021-10-04 RX ORDER — DIPHENHYDRAMINE HYDROCHLORIDE 50 MG/ML
12.5 INJECTION INTRAMUSCULAR; INTRAVENOUS ONCE
Status: DISCONTINUED | OUTPATIENT
Start: 2021-10-04 | End: 2021-10-04 | Stop reason: HOSPADM

## 2021-10-04 RX ORDER — DIPHENHYDRAMINE HYDROCHLORIDE 50 MG/ML
12.5 INJECTION INTRAMUSCULAR; INTRAVENOUS ONCE
Status: COMPLETED | OUTPATIENT
Start: 2021-10-04 | End: 2021-10-04

## 2021-10-04 RX ORDER — ONDANSETRON 4 MG/1
4 TABLET, ORALLY DISINTEGRATING ORAL EVERY 6 HOURS PRN
Qty: 10 TABLET | Refills: 0 | Status: SHIPPED | OUTPATIENT
Start: 2021-10-04 | End: 2022-01-25

## 2021-10-04 RX ORDER — MECLIZINE HYDROCHLORIDE 25 MG/1
25 TABLET ORAL 3 TIMES DAILY PRN
Qty: 30 TABLET | Refills: 0 | Status: SHIPPED | OUTPATIENT
Start: 2021-10-04 | End: 2021-10-06 | Stop reason: SDUPTHER

## 2021-10-04 RX ADMIN — DIPHENHYDRAMINE HYDROCHLORIDE 12.5 MG: 50 INJECTION INTRAMUSCULAR; INTRAVENOUS at 10:59

## 2021-10-04 ASSESSMENT — LIFESTYLE VARIABLES: DO YOU DRINK ALCOHOL: NO

## 2021-10-04 ASSESSMENT — FIBROSIS 4 INDEX: FIB4 SCORE: 2.08

## 2021-10-04 NOTE — ED NOTES
Dc instructions and medications discussed with patient at bedside. All questions answered at this time. VSS. No IV in place at this time. Pt to lobby without incident. son to drive patient home.

## 2021-10-04 NOTE — DISCHARGE INSTRUCTIONS
Please take medication as prescribed to assist with the symptoms you are having.  Please make an appointment, call today to see your primary doctor for further evaluation and treatment of the symptoms.

## 2021-10-04 NOTE — ED PROVIDER NOTES
ED Provider Note    ED Provider    Means of arrival: Private vehicle  History obtained from: Patient and son at bedside  History limited by: None    CHIEF COMPLAINT  Chief Complaint   Patient presents with   • N/V     Just completed dialysis this am, brought over by family for vomiting for 3 days and feeling weak.       HPI  Tere Gallegos is a 70 y.o. female who presents with complaints of spinning type dizziness.  This is been ongoing for approximately 4 days.  She is a dialysis patient in dialysis today that made symptoms worse.  The dizziness is worse with movement of the head and attempted ambulation.  She has had nausea without vomiting.  Said no recent fevers chills or sweats she has no headache.  No numbness tingling or weakness of the extremities.    REVIEW OF SYSTEMS  See HPI for further details. All other systems are negative.     PAST MEDICAL HISTORY   has a past medical history of Acquired hypothyroidism (5/4/2020), CAD (coronary artery disease), Chronic diastolic heart failure (HCC) (5/4/2020), Coronary artery disease due to lipid rich plaque, Dental disorder, Diabetes ([facility]), ESRD (end stage renal disease) on dialysis ([facility]) (5/4/2020), Hemodialysis patient ([facility]), Hyperlipidemia, Hypertension, Kidney transplant candidate, Presence of drug-eluting stent in right coronary artery, QT prolongation (1/22/2020), RLS (restless legs syndrome) (8/5/2016), and Transaminitis (12/22/2018).    SOCIAL HISTORY  Social History     Tobacco Use   • Smoking status: Never Smoker   • Smokeless tobacco: Never Used   Vaping Use   • Vaping Use: Never used   Substance and Sexual Activity   • Alcohol use: No     Alcohol/week: 0.0 oz   • Drug use: No   • Sexual activity: Never       SURGICAL HISTORY   has a past surgical history that includes recovery (8/16/2016); other abdominal surgery; other (Left, 2014); zzz cardiac cath (8/16/16); and zzz cardiac cath (9/7/2016).    CURRENT MEDICATIONS  Home Medications    **Home  "medications have not yet been reviewed for this encounter**         ALLERGIES  No Known Allergies    PHYSICAL EXAM  VITAL SIGNS: /60   Pulse (!) 57   Temp 36.1 °C (97 °F) (Temporal)   Resp 16   Ht 1.575 m (5' 2\")   Wt 74.8 kg (165 lb)   LMP  (LMP Unknown)   SpO2 96%   BMI 30.18 kg/m²   Constitutional: Alert in no apparent distress.  HENT: No signs of trauma, Mucous membranes are moist   Eyes:  Conjunctiva normal, Non-icteric.   Neck: Normal range of motion, No tenderness, Supple,  Lymphatic: No lymphadenopathy noted.   Cardiovascular: Regular rate and rhythm, no murmurs.   Thorax & Lungs: Normal breath sounds, No respiratory distress, No wheezing, No chest tenderness.   Abdomen: Bowel sounds normal, Soft, No tenderness, No masses, No pulsatile masses. No peritoneal signs.  Skin: Warm, Dry,Normal color  Back: No bony tenderness, No CVA tenderness.   Extremities:No edema, No tenderness, No cyanosis,    Musculoskeletal: Good range of motion in all major joints. No tenderness to palpation or major deformities noted.   Neurologic: Alert ,Oriented x4, Normal motor function, Normal sensory function, No focal deficits noted.   Psychiatric: Affect normal, Judgment normal, Mood normal.       MEDICAL DECISION MAKING  This is a 70 y.o. female who presents with symptoms as described.  Should be treated for vertiginous type of symptoms to try to help the symptoms.  She does not have a headache and do not suspect brain bleed.  I do not suspect brain mass.  CT is not indicated at this time.  Lab test will be done to evaluate for electrolyte abnormalities.    DIAGNOSTIC STUDIES / PROCEDURES    EKG      LABS  Results for orders placed or performed during the hospital encounter of 10/04/21   CBC WITH DIFFERENTIAL   Result Value Ref Range    WBC 2.2 (LL) 4.8 - 10.8 K/uL    RBC 3.79 (L) 4.20 - 5.40 M/uL    Hemoglobin 11.8 (L) 12.0 - 16.0 g/dL    Hematocrit 36.6 (L) 37.0 - 47.0 %    MCV 96.6 81.4 - 97.8 fL    MCH 31.1 27.0 " - 33.0 pg    MCHC 32.2 (L) 33.6 - 35.0 g/dL    RDW 51.0 (H) 35.9 - 50.0 fL    Platelet Count 121 (L) 164 - 446 K/uL    MPV 10.7 9.0 - 12.9 fL    Neutrophils-Polys 69.50 44.00 - 72.00 %    Lymphocytes 20.40 (L) 22.00 - 41.00 %    Monocytes 8.60 0.00 - 13.40 %    Eosinophils 0.50 0.00 - 6.90 %    Basophils 0.50 0.00 - 1.80 %    Immature Granulocytes 0.50 0.00 - 0.90 %    Nucleated RBC 0.00 /100 WBC    Neutrophils (Absolute) 1.54 (L) 2.00 - 7.15 K/uL    Lymphs (Absolute) 0.45 (L) 1.00 - 4.80 K/uL    Monos (Absolute) 0.19 0.00 - 0.85 K/uL    Eos (Absolute) 0.01 0.00 - 0.51 K/uL    Baso (Absolute) 0.01 0.00 - 0.12 K/uL    Immature Granulocytes (abs) 0.01 0.00 - 0.11 K/uL    NRBC (Absolute) 0.00 K/uL   COMP METABOLIC PANEL   Result Value Ref Range    Sodium 139 135 - 145 mmol/L    Potassium 3.7 3.6 - 5.5 mmol/L    Chloride 94 (L) 96 - 112 mmol/L    Co2 29 20 - 33 mmol/L    Anion Gap 16.0 7.0 - 16.0    Glucose 98 65 - 99 mg/dL    Bun 14 8 - 22 mg/dL    Creatinine 4.05 (H) 0.50 - 1.40 mg/dL    Calcium 9.8 8.4 - 10.2 mg/dL    AST(SGOT) 20 12 - 45 U/L    ALT(SGPT) 17 2 - 50 U/L    Alkaline Phosphatase 112 (H) 30 - 99 U/L    Total Bilirubin 0.4 0.1 - 1.5 mg/dL    Albumin 4.6 3.2 - 4.9 g/dL    Total Protein 8.0 6.0 - 8.2 g/dL    Globulin 3.4 1.9 - 3.5 g/dL    A-G Ratio 1.4 g/dL   ESTIMATED GFR   Result Value Ref Range    GFR If  13 (A) >60 mL/min/1.73 m 2    GFR If Non African American 11 (A) >60 mL/min/1.73 m 2         RADIOLOGY  No orders to display       COURSE  Pertinent Labs & Imaging studies reviewed. (See chart for details)    10:26 AM - Patient seen and examined at bedside. Discussed plan of care,    Presents with vertiginous type of dizziness, she is medicated with Benadryl 12.5 which improved her symptoms, additional dose did resolve this.    Her labs are consistent with chronic renal failure.  Her white blood cell count is increased but there is no fevers or signs of sepsis.  At this time her symptoms  are consistent with benign positional vertigo should be treated symptomatically and follow-up with a primary care physician    Discharged home in stable condition    FINAL IMPRESSION  1. Vertigo        The note accurately reflects work and decisions made by me.  Noah Crowell D.O.  10/4/2021  2:09 PM

## 2021-10-04 NOTE — ED TRIAGE NOTES
Chief Complaint   Patient presents with   • N/V     Just completed dialysis this am, brought over by family for vomiting for 3 days and feeling weak.

## 2021-10-06 ENCOUNTER — OFFICE VISIT (OUTPATIENT)
Dept: MEDICAL GROUP | Facility: MEDICAL CENTER | Age: 71
End: 2021-10-06
Payer: MEDICARE

## 2021-10-06 VITALS
TEMPERATURE: 96.8 F | DIASTOLIC BLOOD PRESSURE: 54 MMHG | OXYGEN SATURATION: 96 % | HEART RATE: 80 BPM | SYSTOLIC BLOOD PRESSURE: 112 MMHG | BODY MASS INDEX: 28.66 KG/M2 | WEIGHT: 146 LBS | HEIGHT: 60 IN

## 2021-10-06 DIAGNOSIS — H81.13 BENIGN PAROXYSMAL POSITIONAL VERTIGO DUE TO BILATERAL VESTIBULAR DISORDER: ICD-10-CM

## 2021-10-06 PROCEDURE — 99214 OFFICE O/P EST MOD 30 MIN: CPT | Performed by: INTERNAL MEDICINE

## 2021-10-06 RX ORDER — MECLIZINE HYDROCHLORIDE 25 MG/1
25 TABLET ORAL 3 TIMES DAILY PRN
Qty: 30 TABLET | Refills: 0 | Status: SHIPPED | OUTPATIENT
Start: 2021-10-06 | End: 2021-10-06

## 2021-10-06 RX ORDER — MECLIZINE HYDROCHLORIDE 25 MG/1
25 TABLET ORAL 3 TIMES DAILY PRN
Qty: 30 TABLET | Refills: 0 | Status: SHIPPED | OUTPATIENT
Start: 2021-10-06 | End: 2022-01-25

## 2021-10-06 ASSESSMENT — FIBROSIS 4 INDEX: FIB4 SCORE: 2.85

## 2021-10-07 NOTE — PROGRESS NOTES
Established Patient    Tere presents today with the following:    CC:  ER follow up for vertigo    HPI:   Tere is a 70 y.o. female who came in for above. Bengali speaking,  is used.    She has room spinning sensation episodically over 4 days. Provoked by head turning movements or eye movements, ringing in both ears. Hearing is not changed. No similar episode in the past. She had allergy symptoms recently which is better. No neurological symptoms. She had She went to ER 2 days ago, but did not get better. She brought in all meds she is taking. Unfortunately, none ER prescriptions are taken currently (meclizine and zofran). She was not given any instruction on Epley maneuver either.      ROS:   As above    Patient Active Problem List    Diagnosis Date Noted   • Hospital discharge follow-up 08/18/2021   • Paroxysmal A-fib ([facility]) 08/11/2021   • Leukopenia 08/11/2021   • Chronic pain of right knee 07/08/2021   • Pain in the chest 06/16/2021   • Right leg pain 01/25/2021   • Acute hypoxemic respiratory failure due to COVID-19 ([facility]) 11/19/2020   • Pneumonia due to COVID-19 virus 11/18/2020   • Pancytopenia ([facility]) 11/18/2020   • Numbness and tingling in both hands 09/17/2020   • Anuria 09/16/2020   • Subclinical hypothyroidism 07/07/2020   • Long term (current) use of oral hypoglycemic drugs 07/07/2020   • Presence of drug-eluting stent in right coronary artery    • Chronic diastolic heart failure ([facility]) 05/04/2020   • ESRD (end stage renal disease) on dialysis ([facility]) 05/04/2020   • Acquired hypothyroidism 05/04/2020   • Dental disorder 05/04/2020   • Coronary artery disease due to lipid rich plaque    • QT prolongation 01/22/2020   • Mixed hyperlipidemia 11/12/2019   • RLS (restless legs syndrome) 08/05/2016   • Controlled type 2 diabetes mellitus with chronic kidney disease on chronic dialysis, without long-term current use of insulin ([facility]) 08/05/2016   • Kidney transplant candidate    • Essential  hypertension 09/17/2013       Current Outpatient Medications   Medication Sig Dispense Refill   • meclizine (ANTIVERT) 25 MG Tab Take 1 Tablet by mouth 3 times a day as needed for Vertigo. 30 Tablet 0   • ondansetron (ZOFRAN ODT) 4 MG TABLET DISPERSIBLE Take 1 Tablet by mouth every 6 hours as needed for Nausea. 10 Tablet 0   • pregabalin (LYRICA) 25 MG Cap Take 25 mg by mouth 3 times a day.     • sevelamer carbonate (RENVELA) 800 MG Tab tablet Take 1 Tablet by mouth 3 times a day with meals. 270 Tablet 3   • gabapentin (NEURONTIN) 300 MG Cap Take 1 Capsule by mouth at bedtime. 90 Capsule    • amLODIPine (NORVASC) 10 MG Tab Take 1 Tablet by mouth every day. 30 Tablet 2   • apixaban (ELIQUIS) 2.5mg Tab Take 1 Tablet by mouth 2 times a day. Indications: Thromboembolism secondary to Atrial Fibrillation 60 Tablet 2   • atorvastatin (LIPITOR) 80 MG tablet Take 1 Tablet by mouth every evening. 30 Tablet 2   • clopidogrel (PLAVIX) 75 MG Tab Take 1 Tablet by mouth every day. 30 Tablet 2   • furosemide (LASIX) 80 MG Tab Take 80 mg by mouth in the morning, at noon, and at bedtime.     • ondansetron (ZOFRAN ODT) 4 MG TABLET DISPERSIBLE Take 1 tablet by mouth every 8 hours as needed. 10 tablet 0   • carvedilol (COREG) 25 MG Tab TOME FERNANDO TABLETA DOS VECES AL DESMOND CON LAS COMIDAS (Patient taking differently: Take 25 mg by mouth 2 times a day with meals.) 180 tablet 3   • ROPINIRole (REQUIP) 1 MG Tab TOME FERNANDO TABLETA DOS VECES AL DESMOND (Patient taking differently: Take 1 mg by mouth 2 times a day.) 180 tablet 1   • lisinopril (PRINIVIL) 40 MG tablet Take 1 tablet by mouth every day. 90 tablet 3   • hydrALAZINE (APRESOLINE) 100 MG tablet Take 1 tablet by mouth 2 times a day. 180 tablet 3   • pioglitazone (ACTOS) 30 MG Tab Take 1 Tab by mouth every day. 90 Tab 3   • levothyroxine (SYNTHROID) 50 MCG Tab Take 1 Tab by mouth Every morning on an empty stomach. 90 Tab 3     No current facility-administered medications for this visit.          /54   Pulse 80   Temp 36 °C (96.8 °F) (Temporal)   Ht 1.524 m (5')   Wt 66.2 kg (146 lb)   LMP  (LMP Unknown)   SpO2 96%   BMI 28.51 kg/m²     Physical Exam  General: Alert and oriented, No apparent distress.  Eyes: Pupils are equal and reactive. No scleral icterus.  ENT: bilat TM are clear and normal. No sinus tenderness or nasal drip.  Lungs: Clear to auscultation bilaterally without any wheezing, crepitations.  Cardiovascular: Regular rate and rhythm. No murmurs, rubs or gallops.  Extremities: No  edema.       Assessment and Plan    1. Benign paroxysmal positional vertigo due to bilateral vestibular disorder  - resent meclizine (ANTIVERT) 25 MG Tab; Take 1 Tablet by mouth 3 times a day as needed.  Dispense: 30 Tablet; Refill: 0  - explained the need to do modified Epley at home. She has someone who can read English and instruct exercise. Recommend to go to PT with any difficulty doing it at home.       Return if symptoms worsen or fail to improve.       Signed by: Lali Mcdaniel M.D.

## 2021-10-14 ENCOUNTER — HOSPITAL ENCOUNTER (OUTPATIENT)
Facility: MEDICAL CENTER | Age: 71
End: 2021-10-14
Attending: NURSE PRACTITIONER
Payer: MEDICARE

## 2021-10-14 ENCOUNTER — HOSPITAL ENCOUNTER (OUTPATIENT)
Dept: LAB | Facility: MEDICAL CENTER | Age: 71
End: 2021-10-14
Attending: NURSE PRACTITIONER
Payer: MEDICARE

## 2021-10-14 DIAGNOSIS — I48.0 PAROXYSMAL A-FIB (HCC): ICD-10-CM

## 2021-10-14 LAB
ALBUMIN SERPL BCP-MCNC: 4.1 G/DL (ref 3.2–4.9)
ALBUMIN/GLOB SERPL: 1.5 G/DL
ALP SERPL-CCNC: 94 U/L (ref 30–99)
ALT SERPL-CCNC: 14 U/L (ref 2–50)
ANION GAP SERPL CALC-SCNC: 10 MMOL/L (ref 7–16)
AST SERPL-CCNC: 16 U/L (ref 12–45)
BILIRUB SERPL-MCNC: 0.2 MG/DL (ref 0.1–1.5)
BUN SERPL-MCNC: 26 MG/DL (ref 8–22)
CALCIUM SERPL-MCNC: 10.2 MG/DL (ref 8.4–10.2)
CHLORIDE SERPL-SCNC: 96 MMOL/L (ref 96–112)
CO2 SERPL-SCNC: 32 MMOL/L (ref 20–33)
CREAT SERPL-MCNC: 6.29 MG/DL (ref 0.5–1.4)
GLOBULIN SER CALC-MCNC: 2.7 G/DL (ref 1.9–3.5)
GLUCOSE SERPL-MCNC: 91 MG/DL (ref 65–99)
POTASSIUM SERPL-SCNC: 5.8 MMOL/L (ref 3.6–5.5)
PROT SERPL-MCNC: 6.8 G/DL (ref 6–8.2)
SODIUM SERPL-SCNC: 138 MMOL/L (ref 135–145)

## 2021-10-14 PROCEDURE — 36415 COLL VENOUS BLD VENIPUNCTURE: CPT

## 2021-10-14 PROCEDURE — 85027 COMPLETE CBC AUTOMATED: CPT

## 2021-10-14 PROCEDURE — 80053 COMPREHEN METABOLIC PANEL: CPT

## 2021-10-14 PROCEDURE — 85027 COMPLETE CBC AUTOMATED: CPT | Mod: 91

## 2021-10-15 ENCOUNTER — TELEPHONE (OUTPATIENT)
Dept: MEDICAL GROUP | Facility: MEDICAL CENTER | Age: 71
End: 2021-10-15

## 2021-10-15 LAB
ERYTHROCYTE [DISTWIDTH] IN BLOOD BY AUTOMATED COUNT: 54.2 FL (ref 35.9–50)
ERYTHROCYTE [DISTWIDTH] IN BLOOD BY AUTOMATED COUNT: NORMAL FL (ref 35.9–50)
HCT VFR BLD AUTO: 33.2 % (ref 37–47)
HCT VFR BLD AUTO: NORMAL % (ref 37–47)
HGB BLD-MCNC: 10.6 G/DL (ref 12–16)
HGB BLD-MCNC: NORMAL G/DL (ref 12–16)
MCH RBC QN AUTO: 31.5 PG (ref 27–33)
MCH RBC QN AUTO: NORMAL PG (ref 27–33)
MCHC RBC AUTO-ENTMCNC: 31.9 G/DL (ref 33.6–35)
MCHC RBC AUTO-ENTMCNC: NORMAL G/DL (ref 33.6–35)
MCV RBC AUTO: 98.5 FL (ref 81.4–97.8)
MCV RBC AUTO: NORMAL FL (ref 81.4–97.8)
PLATELET # BLD AUTO: 111 K/UL (ref 164–446)
PLATELET # BLD AUTO: NORMAL K/UL (ref 164–446)
PMV BLD AUTO: 11 FL (ref 9–12.9)
PMV BLD AUTO: NORMAL FL (ref 9–12.9)
RBC # BLD AUTO: 3.37 M/UL (ref 4.2–5.4)
RBC # BLD AUTO: NORMAL M/UL (ref 4.2–5.4)
WBC # BLD AUTO: 1.9 K/UL (ref 4.8–10.8)
WBC # BLD AUTO: NORMAL K/UL (ref 4.8–10.8)

## 2021-10-15 NOTE — TELEPHONE ENCOUNTER
VOICEMAIL  1. Caller Name: [facility]         Call Back Number: [phone]    2. Message: Katlyn left message stating pt 's recent lab results indicate critically low values. She wanted to make sure PCP was aware. Please advise.

## 2021-11-03 RX ORDER — CLOPIDOGREL BISULFATE 75 MG/1
TABLET ORAL
Qty: 90 TABLET | Refills: 3 | Status: SHIPPED | OUTPATIENT
Start: 2021-11-03 | End: 2022-01-25

## 2021-11-03 RX ORDER — AMLODIPINE BESYLATE 10 MG/1
TABLET ORAL
Qty: 90 TABLET | Refills: 3 | Status: SHIPPED | OUTPATIENT
Start: 2021-11-03 | End: 2022-08-24

## 2021-11-03 NOTE — TELEPHONE ENCOUNTER
Amlodipine Refill    Received request via: Pharmacy    Was the patient seen in the last year in this department? Yes 10/06/21 Dr. Mcdaniel    Does the patient have an active prescription (recently filled or refills available) for medication(s) requested? No

## 2021-11-10 ENCOUNTER — APPOINTMENT (OUTPATIENT)
Dept: RADIOLOGY | Facility: MEDICAL CENTER | Age: 71
End: 2021-11-10
Attending: EMERGENCY MEDICINE
Payer: MEDICARE

## 2021-11-10 ENCOUNTER — HOSPITAL ENCOUNTER (EMERGENCY)
Facility: MEDICAL CENTER | Age: 71
End: 2021-11-10
Attending: EMERGENCY MEDICINE
Payer: MEDICARE

## 2021-11-10 VITALS
OXYGEN SATURATION: 98 % | DIASTOLIC BLOOD PRESSURE: 61 MMHG | RESPIRATION RATE: 14 BRPM | TEMPERATURE: 97.5 F | WEIGHT: 145.94 LBS | HEIGHT: 60 IN | SYSTOLIC BLOOD PRESSURE: 124 MMHG | HEART RATE: 60 BPM | BODY MASS INDEX: 28.65 KG/M2

## 2021-11-10 DIAGNOSIS — R07.9 CHEST PAIN, UNSPECIFIED TYPE: ICD-10-CM

## 2021-11-10 LAB
ALBUMIN SERPL BCP-MCNC: 4.1 G/DL (ref 3.2–4.9)
ALBUMIN/GLOB SERPL: 1.5 G/DL
ALP SERPL-CCNC: 69 U/L (ref 30–99)
ALT SERPL-CCNC: 13 U/L (ref 2–50)
ANION GAP SERPL CALC-SCNC: 12 MMOL/L (ref 7–16)
AST SERPL-CCNC: 18 U/L (ref 12–45)
BASOPHILS # BLD AUTO: 0 % (ref 0–1.8)
BASOPHILS # BLD: 0 K/UL (ref 0–0.12)
BILIRUB SERPL-MCNC: 0.3 MG/DL (ref 0.1–1.5)
BUN SERPL-MCNC: 10 MG/DL (ref 8–22)
CALCIUM SERPL-MCNC: 9.6 MG/DL (ref 8.4–10.2)
CHLORIDE SERPL-SCNC: 95 MMOL/L (ref 96–112)
CO2 SERPL-SCNC: 30 MMOL/L (ref 20–33)
CREAT SERPL-MCNC: 3.81 MG/DL (ref 0.5–1.4)
EKG IMPRESSION: NORMAL
EKG IMPRESSION: NORMAL
EOSINOPHIL # BLD AUTO: 0.02 K/UL (ref 0–0.51)
EOSINOPHIL NFR BLD: 1.1 % (ref 0–6.9)
ERYTHROCYTE [DISTWIDTH] IN BLOOD BY AUTOMATED COUNT: 49.1 FL (ref 35.9–50)
GLOBULIN SER CALC-MCNC: 2.7 G/DL (ref 1.9–3.5)
GLUCOSE SERPL-MCNC: 124 MG/DL (ref 65–99)
HCT VFR BLD AUTO: 34.4 % (ref 37–47)
HGB BLD-MCNC: 11 G/DL (ref 12–16)
IMM GRANULOCYTES # BLD AUTO: 0.01 K/UL (ref 0–0.11)
IMM GRANULOCYTES NFR BLD AUTO: 0.5 % (ref 0–0.9)
LYMPHOCYTES # BLD AUTO: 0.48 K/UL (ref 1–4.8)
LYMPHOCYTES NFR BLD: 25.7 % (ref 22–41)
MCH RBC QN AUTO: 30.4 PG (ref 27–33)
MCHC RBC AUTO-ENTMCNC: 32 G/DL (ref 33.6–35)
MCV RBC AUTO: 95 FL (ref 81.4–97.8)
MONOCYTES # BLD AUTO: 0.23 K/UL (ref 0–0.85)
MONOCYTES NFR BLD AUTO: 12.3 % (ref 0–13.4)
NEUTROPHILS # BLD AUTO: 1.13 K/UL (ref 2–7.15)
NEUTROPHILS NFR BLD: 60.4 % (ref 44–72)
NRBC # BLD AUTO: 0 K/UL
NRBC BLD-RTO: 0 /100 WBC
PLATELET # BLD AUTO: 112 K/UL (ref 164–446)
PMV BLD AUTO: 10.3 FL (ref 9–12.9)
POTASSIUM SERPL-SCNC: 3.5 MMOL/L (ref 3.6–5.5)
PROT SERPL-MCNC: 6.8 G/DL (ref 6–8.2)
RBC # BLD AUTO: 3.62 M/UL (ref 4.2–5.4)
SODIUM SERPL-SCNC: 137 MMOL/L (ref 135–145)
TROPONIN T SERPL-MCNC: 56 NG/L (ref 6–19)
TROPONIN T SERPL-MCNC: 67 NG/L (ref 6–19)
TROPONIN T SERPL-MCNC: 69 NG/L (ref 6–19)
WBC # BLD AUTO: 1.9 K/UL (ref 4.8–10.8)

## 2021-11-10 PROCEDURE — 85025 COMPLETE CBC W/AUTO DIFF WBC: CPT

## 2021-11-10 PROCEDURE — 84484 ASSAY OF TROPONIN QUANT: CPT | Mod: 91

## 2021-11-10 PROCEDURE — 36415 COLL VENOUS BLD VENIPUNCTURE: CPT

## 2021-11-10 PROCEDURE — 99284 EMERGENCY DEPT VISIT MOD MDM: CPT

## 2021-11-10 PROCEDURE — 93005 ELECTROCARDIOGRAM TRACING: CPT | Performed by: EMERGENCY MEDICINE

## 2021-11-10 PROCEDURE — 71045 X-RAY EXAM CHEST 1 VIEW: CPT

## 2021-11-10 PROCEDURE — 80053 COMPREHEN METABOLIC PANEL: CPT

## 2021-11-10 PROCEDURE — 93005 ELECTROCARDIOGRAM TRACING: CPT

## 2021-11-10 ASSESSMENT — HEART SCORE
ECG: NON-SPECIFIC REPOLARIZATION DISTURBANCE
AGE: 65+
HEART SCORE: 5
HISTORY: SLIGHTLY SUSPICIOUS
TROPONIN: LESS THAN OR EQUAL TO NORMAL LIMIT
RISK FACTORS: >2 RISK FACTORS OR HX OF ATHEROSCLEROTIC DISEASE

## 2021-11-10 ASSESSMENT — FIBROSIS 4 INDEX: FIB4 SCORE: 2.74

## 2021-11-10 NOTE — ED NOTES
Med rec updated and complete  Allergies reviewed  Interviewed pt with daughter at bedside with permission from pt.  Used  services spoke with Maurice ([id number]) to verify all medications.  Pt reports that she has been out of her FUROSEMIDE 80MG about 2 months ago.  Pt is not sure when she took her MECLIZINE 25MG, ZOFRAN 4MG, or RENVELA 800MG last.  Pt had some RX bottles at bedside, went over all RX bottles and returned RX bottles back to pts daughter.  Pt reports that she gets her dialysis on Mon, Wed, and Fri.  Pt reports that she only takes her CARVEDILOL 25MG and HYDRALAZINE 100MG 1 tablet once a day, because it makes her feel sick.  Pt reports no antibiotics in the last 30 days.    No current facility-administered medications on file prior to encounter.     Current Outpatient Medications on File Prior to Encounter   Medication Sig Dispense Refill   • amLODIPine (NORVASC) 10 MG Tab TOME FERNANDO TABLETA TODOS LOS M[place]ORMICK (Patient taking differently: Take 10 mg by mouth every day.) 90 Tablet 3   • clopidogrel (PLAVIX) 75 MG Tab TOME FERNANDO TABLETA TODOS [place]ORMICK (Patient taking differently: Take 75 mg by mouth every day.) 90 Tablet 3   • meclizine (ANTIVERT) 25 MG Tab Take 1 Tablet by mouth 3 times a day as needed for Vertigo. 30 Tablet 0   • ondansetron (ZOFRAN ODT) 4 MG TABLET DISPERSIBLE Take 1 Tablet by mouth every 6 hours as needed for Nausea. 10 Tablet 0   • sevelamer carbonate (RENVELA) 800 MG Tab tablet Take 1 Tablet by mouth 3 times a day with meals. 270 Tablet 3   • apixaban (ELIQUIS) 2.5mg Tab Take 1 Tablet by mouth 2 times a day. Indications: Thromboembolism secondary to Atrial Fibrillation 60 Tablet 2   • carvedilol (COREG) 25 MG Tab TOME FERNANDO TABLETA DOS VECES AL DESMOND CON LAS COMIDAS (Patient taking differently: Take 25 mg by mouth every day. Per pt only takes once a day, makes her feel sick) 180 tablet 3   • ROPINIRole (REQUIP) 1 MG Tab TOME FERNANDO TABLETA DOS VECES AL DESMOND (Patient taking differently: Take  1-2 mg by mouth 2 times a day. Pt takes 1MG in Am and 2MG at bedtime) 180 tablet 1   • lisinopril (PRINIVIL) 40 MG tablet Take 1 tablet by mouth every day. 90 tablet 3   • hydrALAZINE (APRESOLINE) 100 MG tablet Take 1 tablet by mouth 2 times a day. (Patient taking differently: Take 100 mg by mouth every day. Per pt only takes once a day, makes her feel sick) 180 tablet 3   • pioglitazone (ACTOS) 30 MG Tab Take 1 Tab by mouth every day. 90 Tab 3   • levothyroxine (SYNTHROID) 50 MCG Tab Take 1 Tab by mouth Every morning on an empty stomach. 90 Tab 3

## 2021-11-10 NOTE — ED PROVIDER NOTES
ED Provider Note    CHIEF COMPLAINT  Chief Complaint   Patient presents with   • Chest Pain     L side, pain radiates down arm and into L side leg        HPI  Tere Gallegos is a 70 y.o. female Hungarian-speaking.  We used a  throughout the entire visit who has a history of coronary artery disease/renal disease including dialysis who comes in today with chest pain.  Happened at around 11 before 9:00.  It is intermittent comes and goes is now better almost gone.  I would say that is just when she took out the catheter for dialysis in her left shunt fistula.  Radiate to her chest and down her leg and both legs.  She felt weak.  And then come in and go.  It was not associated neck pain no nausea no vomiting no dizziness and no tearing back pain.    She has no history of aneurysm.  She states that she cannot remember having heart attack before.  She had this before where she was pain to the ER.    Cardiac risk factors her dialysis dialysis.  She is has positive cholesterol on medications positive diabetes positive hypertension no tobacco no drug use.  No history DVT no history of PE no leg swelling.  No recent drug use.        REVIEW OF SYSTEMS  General: No fever or chills.  Eyes: No eye discharge or eye pain  Ears nose throat: Nose or throat trouble swallowing  Pulmonary: Clear to auscultation bilaterally  Cardiac: See above.  GI: No abdominal pain. See above   : No dysuria  Dermatologic: No rashes  Neurologic: No weakness or numbness    PAST MEDICAL HISTORY  Past Medical History:   Diagnosis Date   • Acquired hypothyroidism 5/4/2020   • CAD (coronary artery disease)    • Chronic diastolic heart failure ([facility]) 5/4/2020   • Coronary artery disease due to lipid rich plaque     2 Synergy NOEMI to 100% RCA stent placed   • Dental disorder     partial dentures- uppers   • Diabetes ([facility])     oral medication   • ESRD (end stage renal disease) on dialysis ([facility]) 5/4/2020   • Hemodialysis patient ([facility])      M, W, F   • Hyperlipidemia    • Hypertension    • Kidney transplant candidate    • Presence of drug-eluting stent in right coronary artery    • QT prolongation 1/22/2020   • RLS (restless legs syndrome) 8/5/2016   • Transaminitis 12/22/2018       FAMILY HISTORY  Family History   Problem Relation Age of Onset   • Diabetes Sister    • Other Sister         liver disease   • Diabetes Brother    • Heart Disease Neg Hx        SOCIAL HISTORY  Social History     Socioeconomic History   • Marital status:      Spouse name: Not on file   • Number of children: Not on file   • Years of education: Not on file   • Highest education level: Not on file   Occupational History   • Not on file   Tobacco Use   • Smoking status: Never Smoker   • Smokeless tobacco: Never Used   Vaping Use   • Vaping Use: Never used   Substance and Sexual Activity   • Alcohol use: No     Alcohol/week: 0.0 oz   • Drug use: No   • Sexual activity: Never   Other Topics Concern   • Not on file   Social History Narrative   • Not on file     Social Determinants of Health     Financial Resource Strain:    • Difficulty of Paying Living Expenses: Not on file   Food Insecurity:    • Worried About Running Out of Food in the Last Year: Not on file   • Ran Out of Food in the Last Year: Not on file   Transportation Needs:    • Lack of Transportation (Medical): Not on file   • Lack of Transportation (Non-Medical): Not on file   Physical Activity:    • Days of Exercise per Week: Not on file   • Minutes of Exercise per Session: Not on file   Stress:    • Feeling of Stress : Not on file   Social Connections:    • Frequency of Communication with Friends and Family: Not on file   • Frequency of Social Gatherings with Friends and Family: Not on file   • Attends Christian Services: Not on file   • Active Member of Clubs or Organizations: Not on file   • Attends Club or Organization Meetings: Not on file   • Marital Status: Not on file   Intimate Partner Violence:    •  Fear of Current or Ex-Partner: Not on file   • Emotionally Abused: Not on file   • Physically Abused: Not on file   • Sexually Abused: Not on file   Housing Stability:    • Unable to Pay for Housing in the Last Year: Not on file   • Number of Places Lived in the Last Year: Not on file   • Unstable Housing in the Last Year: Not on file       SURGICAL HISTORY  Past Surgical History:   Procedure Laterality Date   • ZZZ CARDIAC CATH  9/7/2016    RCA stented with 2 Synergy drug-eluting stents.   • RECOVERY  8/16/2016    Procedure: CATH LAB [facility] WITH POSSIBLE DR. CASTILLO;  Surgeon: Recoveryonly Surgery;  Location: SURGERY PRE-POST PROC UNIT [facility];  Service:    • Z CARDIAC CATH  8/16/16    100% RCA   • OTHER Left 2014    left arm upper extremity fistula   • OTHER ABDOMINAL SURGERY      left kidney removed due to cancer       CURRENT MEDICATIONS  Home Medications     Reviewed by Katelin River R.N. (Registered Nurse) on 11/10/21 at 1135  Med List Status: Not Addressed   Medication Last Dose Status   amLODIPine (NORVASC) 10 MG Tab  Active   apixaban (ELIQUIS) 2.5mg Tab  Active   atorvastatin (LIPITOR) 80 MG tablet  Active   carvedilol (COREG) 25 MG Tab  Active   clopidogrel (PLAVIX) 75 MG Tab  Active   furosemide (LASIX) 80 MG Tab  Active   gabapentin (NEURONTIN) 300 MG Cap  Active   hydrALAZINE (APRESOLINE) 100 MG tablet  Active   levothyroxine (SYNTHROID) 50 MCG Tab  Active   lisinopril (PRINIVIL) 40 MG tablet  Active   meclizine (ANTIVERT) 25 MG Tab  Active   ondansetron (ZOFRAN ODT) 4 MG TABLET DISPERSIBLE  Active   ondansetron (ZOFRAN ODT) 4 MG TABLET DISPERSIBLE  Active   pioglitazone (ACTOS) 30 MG Tab  Active   pregabalin (LYRICA) 25 MG Cap  Active   ROPINIRole (REQUIP) 1 MG Tab  Active   sevelamer carbonate (RENVELA) 800 MG Tab tablet  Active              idctlpar  ALLERGIES  No Known Allergies    PHYSICAL EXAM  VITAL SIGNS: /60   Pulse (!) 113   Temp 36.2 °C (97.1 °F) (Temporal)   Resp 14   Ht  1.524 m (5')   Wt 66.2 kg (145 lb 15.1 oz)   LMP  (LMP Unknown)   SpO2 99%   BMI 28.50 kg/m²      Constitutional: Well developed, Well nourished, No acute distress, Non-toxic appearance.   Psychiatric: Calm. Not anxious.  ENT:  Oropharynx: no exudate no erythema  Eyes: PERRLA, EOMI, Conjunctiva normal, No discharge.   Hematologic/ lymphatic: No cervical lymphadenopathy  Musculoskeletal: Neck is soft and supple no meningismus  Cardiovascular: Irregular rhythm rate of 96.  Negative Homans negative cords no pedal edema  Pulmonary: Clear to auscultation bilaterally no wheezes rales or rhonchi  GI: Nondistended nontender soft  :No CVA tenderness  Dermatologic: No rashes or abrasions  Neurologic: Alert and oriented. Moves all extremities equally.  Psychiatric: Not anxious, calm.      RADIOLOGY/PROCEDURES  Results for orders placed or performed during the hospital encounter of 11/10/21   CBC with Differential   Result Value Ref Range    WBC 1.9 (LL) 4.8 - 10.8 K/uL    RBC 3.62 (L) 4.20 - 5.40 M/uL    Hemoglobin 11.0 (L) 12.0 - 16.0 g/dL    Hematocrit 34.4 (L) 37.0 - 47.0 %    MCV 95.0 81.4 - 97.8 fL    MCH 30.4 27.0 - 33.0 pg    MCHC 32.0 (L) 33.6 - 35.0 g/dL    RDW 49.1 35.9 - 50.0 fL    Platelet Count 112 (L) 164 - 446 K/uL    MPV 10.3 9.0 - 12.9 fL    Neutrophils-Polys 60.40 44.00 - 72.00 %    Lymphocytes 25.70 22.00 - 41.00 %    Monocytes 12.30 0.00 - 13.40 %    Eosinophils 1.10 0.00 - 6.90 %    Basophils 0.00 0.00 - 1.80 %    Immature Granulocytes 0.50 0.00 - 0.90 %    Nucleated RBC 0.00 /100 WBC    Neutrophils (Absolute) 1.13 (L) 2.00 - 7.15 K/uL    Lymphs (Absolute) 0.48 (L) 1.00 - 4.80 K/uL    Monos (Absolute) 0.23 0.00 - 0.85 K/uL    Eos (Absolute) 0.02 0.00 - 0.51 K/uL    Baso (Absolute) 0.00 0.00 - 0.12 K/uL    Immature Granulocytes (abs) 0.01 0.00 - 0.11 K/uL    NRBC (Absolute) 0.00 K/uL   Complete Metabolic Panel (CMP)   Result Value Ref Range    Sodium 137 135 - 145 mmol/L    Potassium 3.5 (L) 3.6 - 5.5  mmol/L    Chloride 95 (L) 96 - 112 mmol/L    Co2 30 20 - 33 mmol/L    Anion Gap 12.0 7.0 - 16.0    Glucose 124 (H) 65 - 99 mg/dL    Bun 10 8 - 22 mg/dL    Creatinine 3.81 (H) 0.50 - 1.40 mg/dL    Calcium 9.6 8.4 - 10.2 mg/dL    AST(SGOT) 18 12 - 45 U/L    ALT(SGPT) 13 2 - 50 U/L    Alkaline Phosphatase 69 30 - 99 U/L    Total Bilirubin 0.3 0.1 - 1.5 mg/dL    Albumin 4.1 3.2 - 4.9 g/dL    Total Protein 6.8 6.0 - 8.2 g/dL    Globulin 2.7 1.9 - 3.5 g/dL    A-G Ratio 1.5 g/dL   Troponin   Result Value Ref Range    Troponin T 56 (H) 6 - 19 ng/L   ESTIMATED GFR   Result Value Ref Range    GFR If  14 (A) >60 mL/min/1.73 m 2    GFR If Non  12 (A) >60 mL/min/1.73 m 2   TROPONIN   Result Value Ref Range    Troponin T 69 (H) 6 - 19 ng/L   TROPONIN   Result Value Ref Range    Troponin T 67 (H) 6 - 19 ng/L   EKG   Result Value Ref Range    Report       [facility]    Test Date:  2021-11-10  Pt Name:    DIOR NICHOLS    Department: [facility]  MRN:        [id number]                      Room:       [place] [address]  Gender:     Female                       Technician: VIANNEY  :        1950                   Requested By:ER TRIAGE PROTOCOL  Order #:    [id number]                    Reading MD: Jesus Alberto HERNANDEZ MD    Measurements  Intervals                                Axis  Rate:       128                          P:  DC:                                      QRS:        78  QRSD:       88                           T:          -57  QT:         319  QTc:        466    Interpretive Statements  Atrial fibrillation  Rate of 128.  Diffuse ST segment elevations throughout.  No reciprocal  changes.  Normal axis.  QRS normal.  Atrial fibrillation with either rate related  ischemic  changes or new ischemia cannot rule out andWith a chief complaint of  weakness.  [place]ha those laterally changes a re noted in 2021 and 1 EKG but not the  other.  Abnormal  cannot rule out ischemic issues.  Electronically Signed On 11- 13:22:19 PST by Jesus Alberto HERNANDEZ MD     EKG   Result Value Ref Range    Report       [facility] Emergency Dept[facility]    Test Date:  2021-11-10  Pt Name:    DIOR NICHOLS    Department: [facility]  MRN:        [id number]                      Room:       [place] [address]  Gender:     Female                       Technician: ANASTASIIA  :        1950                   Requested By:JESUS ALBERTO HERNANDEZ  Order #:    [id number]                    Reading MD:    Measurements  Intervals                                Axis  Rate:       66                           P:          22  WV:         259                          QRS:        30  QRSD:       93                           T:          179  QT:         448  QTc:        470    Interpretive Statements  Sinus rhythm  Prolonged WV interval  Abnormal T, consider ischemia, lateral leads  Compared to ECG 11/10/2021 10:30:14  First degree AV block now present  T-wave abnormality now present  Atrial fibrillation no longer present  Possible ischemia still present          COURSE & MEDICAL DECISION MAKING  Pertinent Labs & Imaging studies reviewed. (See chart for details)    Part case the patient obviously has a heart disease because of her kidney disease.    At this point patient's troponin is borderline elevated her EKG shows nondiffuse ST segment depressions which most likely rhythm rate related.  We will go ahead and recheck her EKG she feels much better at this time.  And the intermittent chest pain I am not sure exactly the etiology is.    Atypical chest pain  Plan repeat troponin.  Make sure is not rising  Repeat EKG make shows no new changes.    .  This point, troponin is gone down suspect is secondary to the atrial fibrillation.  She is much improved now.  And heart rate appears to be normal.    This point patient had some chest pain I suspect it may be the atrial fibrillation I  have asked her to contact if symptoms worsen.    FINAL IMPRESSION  1.  Chest pain  2.          Electronically signed by: Jesus Alberto Stout M.D., 11/10/2021 1:45 PM

## 2021-11-10 NOTE — ED NOTES
Eddy from Lab called with critical result of WBC 1.9 at 1208. Critical lab result read back to Eddy.     notified of critical lab result at 2108.  Critical lab result read back by  2108.

## 2021-11-11 NOTE — ED NOTES
used for patient discharge. All discharged instructions reviewed. Patient verbalizes understanding. PIV removed. Patient feels safe going home. Advised to come back for new or worsening symptoms.

## 2021-11-11 NOTE — ED NOTES
Repeat Troponin sent to lab.     Pt resting comfortably.   Daughter away from bedside.   Bed in lowest position.   Call light within reach.

## 2021-12-01 ENCOUNTER — TELEPHONE (OUTPATIENT)
Dept: ENDOCRINOLOGY | Facility: MEDICAL CENTER | Age: 71
End: 2021-12-01

## 2021-12-01 NOTE — TELEPHONE ENCOUNTER
LVM notifying PT that we do not accept [employer] but that we're still able to see her. I told her that we would just need her to sign an OON form

## 2021-12-03 ENCOUNTER — APPOINTMENT (OUTPATIENT)
Dept: ENDOCRINOLOGY | Facility: MEDICAL CENTER | Age: 71
End: 2021-12-03
Attending: NURSE PRACTITIONER
Payer: MEDICARE

## 2021-12-08 ENCOUNTER — OFFICE VISIT (OUTPATIENT)
Dept: ENDOCRINOLOGY | Facility: MEDICAL CENTER | Age: 71
End: 2021-12-08
Payer: MEDICARE

## 2021-12-08 VITALS
OXYGEN SATURATION: 98 % | HEIGHT: 60 IN | WEIGHT: 146 LBS | HEART RATE: 60 BPM | BODY MASS INDEX: 28.66 KG/M2 | DIASTOLIC BLOOD PRESSURE: 62 MMHG | SYSTOLIC BLOOD PRESSURE: 128 MMHG

## 2021-12-08 DIAGNOSIS — E55.9 VITAMIN D DEFICIENCY: ICD-10-CM

## 2021-12-08 DIAGNOSIS — E78.5 HYPERLIPIDEMIA, UNSPECIFIED HYPERLIPIDEMIA TYPE: ICD-10-CM

## 2021-12-08 DIAGNOSIS — Z99.2 CONTROLLED TYPE 2 DIABETES MELLITUS WITH CHRONIC KIDNEY DISEASE ON CHRONIC DIALYSIS, WITHOUT LONG-TERM CURRENT USE OF INSULIN (HCC): Primary | ICD-10-CM

## 2021-12-08 DIAGNOSIS — N18.6 CONTROLLED TYPE 2 DIABETES MELLITUS WITH CHRONIC KIDNEY DISEASE ON CHRONIC DIALYSIS, WITHOUT LONG-TERM CURRENT USE OF INSULIN (HCC): Primary | ICD-10-CM

## 2021-12-08 DIAGNOSIS — E03.8 SUBCLINICAL HYPOTHYROIDISM: ICD-10-CM

## 2021-12-08 DIAGNOSIS — E11.22 CONTROLLED TYPE 2 DIABETES MELLITUS WITH CHRONIC KIDNEY DISEASE ON CHRONIC DIALYSIS, WITHOUT LONG-TERM CURRENT USE OF INSULIN (HCC): Primary | ICD-10-CM

## 2021-12-08 DIAGNOSIS — I10 HYPERTENSION, UNSPECIFIED TYPE: ICD-10-CM

## 2021-12-08 LAB
HBA1C MFR BLD: 5.7 % (ref 0–5.6)
INT CON NEG: NEGATIVE
INT CON POS: POSITIVE

## 2021-12-08 PROCEDURE — 83036 HEMOGLOBIN GLYCOSYLATED A1C: CPT

## 2021-12-08 PROCEDURE — 99212 OFFICE O/P EST SF 10 MIN: CPT

## 2021-12-08 PROCEDURE — 99214 OFFICE O/P EST MOD 30 MIN: CPT

## 2021-12-08 RX ORDER — PREGABALIN 25 MG/1
25 CAPSULE ORAL 2 TIMES DAILY
COMMUNITY
End: 2022-08-24

## 2021-12-08 ASSESSMENT — FIBROSIS 4 INDEX: FIB4 SCORE: 3.16

## 2021-12-08 NOTE — PROGRESS NOTES
"Chief Complaint:  Consult requested by ANGELITA Concepcion for initial evaluation of Type 2 Diabetes Mellitus    HPI:   1. Controlled type 2 diabetes mellitus with chronic kidney disease on chronic dialysis without long-term current use of insulin:  Tere Gallegos is a 70 y.o. female with Type 2 Diabetes Mellitus diagnosed in 20 years ago.  She has seen other providers in our office but she is a new patient to me.    She denies hospitalizations for DKA in the past.    She does not monitor her blood sugars at home.  And decline the need to monitor her blood sugar at home.        She denies hypoglycemic episodes.    She  denies hypoglycemic unawareness.   She denies episodes of severe hypoglycemia requiring third party assistance.  She  is not wearing a medical alert bracelet or necklace.  And declined the need to use one right now.    She denies attending diabetes education classes.  Diet: \"healthy\" diet  in general  well balanced.    Diabetes Complications   She  reports history of mild proliferative diabetic retinopathy.    She denies laser eye surgery.   Cataract surgery both eyes were done this year   Last eye exam: Unknown, but next appointment is on December 14th 2021.  Opthlmology appt is on January 10th    She reports history of peripheral sensory neuropathy.    She reports numbness, to both arms bilaterally when she is asleep he wakes her up from sleep.  This  resolves within a few minutes with arm movement.   Denies numbness or tingling to her feet but reports pain occasionally she is currently taking Lyrica.  she denies history of foot sores.   She reports history of kidney damage.    She gets dialysis M/W/F.  nephrologist. Is Dr. Priyank Villar.   She is on ACE inhibitor or ARB.     She reports history of coronary artery disease plus Paroxysmal Atrial Fibrillation. She is taking Eliquis.   She is seeing at [facility] by Aster Rosas.     She  denies " history of stroke and denies TIA.  She reports history of hyperlipidemia.    Last A1C on 11/8/21 at 5.7%, she is a dialysis patient which makes A1c unreliable.    Lab Results   Component Value Date/Time    HBA1C 5.7 (A) 12/08/2021 1520    AVGLUC 111 04/28/2021 0937        Ref. Range 10/4/2021 10:13 10/14/2021 09:50 11/10/2021 11:43   Glucose Latest Ref Range: 65 - 99 mg/dL 98 91 124 (H)       2. Subclinical hypothyroidism:   She is currently taking 50 mcg of levothyroxine daily this has been her medication dosage for over a year  She takes it every morning on an empty stomach  Denies taking any iron, calcium, multivitamins, antiacids with the medication  Reports hair loss,   Denies fatigue, constipation, dry skin, palpitations.        Ref. Range 8/12/2021 02:01   TSH Latest Ref Range: 0.380 - 5.330 uIU/mL 4.150     3. Hypertension, unspecified type:  Currently taking Norvasc 10 mg, carvedilol 25 mg, lisinopril 40 mg  BP in office today was 128/62  She does not check her blood pressure at home  Denies the need to check her blood pressure at home  Denies any dizziness, palpitations, chest pain    4. Hyperlipidemia, unspecified hyperlipidemia type:  She is currently not taking any medications  She was taking a statin in the past but it was stopped due to muscle aches  She still complaining of muscle aches especially in her lower legs    5. Vitamin D deficiency:  Currently not taking any vitamin D      ROS:     CONS:     No fever, no chills, no weight loss, no fatigue   EYES:      No diplopia, no blurry vision, no redness of eyes, no swelling of eyelids   ENT:    No hearing loss, No ear pain, No sore throat, no dysphagia, no neck swelling   CV:     No chest pain, no palpitations, no claudication, no orthopnea, no PND   PULM:    No SOB, no cough, no hemoptysis, no wheezing    GI:   No nausea, no vomiting, no diarrhea, no constipation, no bloody stools   :  Passing urine well, no dysuria, no hematuria   ENDO:   No  polyuria, no polydipsia, no heat intolerance, no cold intolerance   NEURO: No headaches, no dizziness, no convulsions, no tremors   MUSC:  No joint swellings, no arthralgias, no myalgias, no weakness   SKIN:   No rash, no ulcers, no dry skin   PSYCH:   No depression, no anxiety, no difficulty sleeping       Past Medical History:  Patient Active Problem List    Diagnosis Date Noted   • Hospital discharge follow-up 08/18/2021   • Paroxysmal A-fib ([facility]) 08/11/2021   • Leukopenia 08/11/2021   • Chronic pain of right knee 07/08/2021   • Pain in the chest 06/16/2021   • Right leg pain 01/25/2021   • Acute hypoxemic respiratory failure due to COVID-19 ([facility]) 11/19/2020   • Pneumonia due to COVID-19 virus 11/18/2020   • Pancytopenia ([facility]) 11/18/2020   • Numbness and tingling in both hands 09/17/2020   • Anuria 09/16/2020   • Subclinical hypothyroidism 07/07/2020   • Long term (current) use of oral hypoglycemic drugs 07/07/2020   • Presence of drug-eluting stent in right coronary artery    • Chronic diastolic heart failure ([facility]) 05/04/2020   • ESRD (end stage renal disease) on dialysis ([facility]) 05/04/2020   • Acquired hypothyroidism 05/04/2020   • Dental disorder 05/04/2020   • Coronary artery disease due to lipid rich plaque    • QT prolongation 01/22/2020   • Mixed hyperlipidemia 11/12/2019   • RLS (restless legs syndrome) 08/05/2016   • Controlled type 2 diabetes mellitus with chronic kidney disease on chronic dialysis, without long-term current use of insulin ([facility]) 08/05/2016   • Kidney transplant candidate    • Essential hypertension 09/17/2013       Past Surgical History:  Past Surgical History:   Procedure Laterality Date   • ZZZ CARDIAC CATH  9/7/2016    RCA stented with 2 Synergy drug-eluting stents.   • RECOVERY  8/16/2016    Procedure: CATH LAB [facility] WITH POSSIBLE DR. CASTILLO;  Surgeon: Recoveryonly Surgery;  [place] [facility] [place]:    • ZZZ CARDIAC CATH  8/16/16    100% RCA   • OTHER  Left 2014    left arm upper extremity fistula   • OTHER ABDOMINAL SURGERY      left kidney removed due to cancer        Allergies:  Patient has no known allergies.     Current Medications:    Current Outpatient Medications:   •  pregabalin (LYRICA) 25 MG Cap, Take 25 mg by mouth 3 times a day., Disp: , Rfl:   •  amLODIPine (NORVASC) 10 MG Tab, TOME FERNANDO TABLETA TODOS LOS MCCORMICK (Patient taking differently: Take 10 mg by mouth every day.), Disp: 90 Tablet, Rfl: 3  •  sevelamer carbonate (RENVELA) 800 MG Tab tablet, Take 1 Tablet by mouth 3 times a day with meals., Disp: 270 Tablet, Rfl: 3  •  apixaban (ELIQUIS) 2.5mg Tab, Take 1 Tablet by mouth 2 times a day. Indications: Thromboembolism secondary to Atrial Fibrillation, Disp: 60 Tablet, Rfl: 2  •  carvedilol (COREG) 25 MG Tab, TOME FERNANDO TABLETA DOS VECES AL DESMOND CON LAS COMIDAS (Patient taking differently: Take 25 mg by mouth every day. Per pt only takes once a day, makes her feel sick), Disp: 180 tablet, Rfl: 3  •  ROPINIRole (REQUIP) 1 MG Tab, TOME FERNANDO TABLETA DOS VECES AL DESMOND (Patient taking differently: Take 1-2 mg by mouth 2 times a day. Pt takes 1MG in Am and 2MG at bedtime), Disp: 180 tablet, Rfl: 1  •  lisinopril (PRINIVIL) 40 MG tablet, Take 1 tablet by mouth every day., Disp: 90 tablet, Rfl: 3  •  hydrALAZINE (APRESOLINE) 100 MG tablet, Take 1 tablet by mouth 2 times a day. (Patient taking differently: Take 100 mg by mouth every day. Per pt only takes once a day, makes her feel sick), Disp: 180 tablet, Rfl: 3  •  pioglitazone (ACTOS) 30 MG Tab, Take 1 Tab by mouth every day., Disp: 90 Tab, Rfl: 3  •  levothyroxine (SYNTHROID) 50 MCG Tab, Take 1 Tab by mouth Every morning on an empty stomach., Disp: 90 Tab, Rfl: 3  •  clopidogrel (PLAVIX) 75 MG Tab, TOME FERNANDO TABLETA TODOS [place] MCCORMICK (Patient taking differently: Take 75 mg by mouth every day.), Disp: 90 Tablet, Rfl: 3  •  meclizine (ANTIVERT) 25 MG Tab, Take 1 Tablet by mouth 3 times a day as needed for Vertigo.,  Disp: 30 Tablet, Rfl: 0  •  ondansetron (ZOFRAN ODT) 4 MG TABLET DISPERSIBLE, Take 1 Tablet by mouth every 6 hours as needed for Nausea., Disp: 10 Tablet, Rfl: 0    Social History:  Social History     Socioeconomic History   • Marital status:      Spouse name: Not on file   • Number of children: Not on file   • Years of education: Not on file   • Highest education level: Not on file   Occupational History   • Not on file   Tobacco Use   • Smoking status: Never Smoker   • Smokeless tobacco: Never Used   Vaping Use   • Vaping Use: Never used   Substance and Sexual Activity   • Alcohol use: No     Alcohol/week: 0.0 oz   • Drug use: No   • Sexual activity: Never   Other Topics Concern   • Not on file   Social History Narrative   • Not on file     Social Determinants of Health     Financial Resource Strain:    • Difficulty of Paying Living Expenses: Not on file   Food Insecurity:    • Worried About Running Out of Food in the Last Year: Not on file   • Ran Out of Food in the Last Year: Not on file   Transportation Needs:    • Lack of Transportation (Medical): Not on file   • Lack of Transportation (Non-Medical): Not on file   Physical Activity:    • Days of Exercise per Week: Not on file   • Minutes of Exercise per Session: Not on file   Stress:    • Feeling of Stress : Not on file   Social Connections:    • Frequency of Communication with Friends and Family: Not on file   • Frequency of Social Gatherings with Friends and Family: Not on file   • Attends Christian Services: Not on file   • Active Member of Clubs or Organizations: Not on file   • Attends Club or Organization Meetings: Not on file   • Marital Status: Not on file   Intimate Partner Violence:    • Fear of Current or Ex-Partner: Not on file   • Emotionally Abused: Not on file   • Physically Abused: Not on file   • Sexually Abused: Not on file   Housing Stability:    • Unable to Pay for Housing in the Last Year: Not on file   • Number of Places Lived in  the Last Year: Not on file   • Unstable Housing in the Last Year: Not on file        Family History:   Family History   Problem Relation Age of Onset   • Diabetes Sister    • Other Sister         liver disease   • Diabetes Brother    • Heart Disease Neg Hx        PHYSICAL EXAM:   Vital signs: /62 (BP Location: Right arm, Patient Position: Sitting, BP Cuff Size: Adult)   Pulse 60   Ht 1.524 m (5')   Wt 66.2 kg (146 lb)   LMP  (LMP Unknown)   SpO2 98%   BMI 28.51 kg/m²   GENERAL: Well-developed, well-nourished  in no apparent distress.   EYE: No ocular and eyelid asymmetry, Anicteric sclerae,  PERRL, No exophthalmos or lidlag  HENT: Hearing grossly intact, Normocephalic, atraumatic. NECK: Supple. Trachea midline. thyroid is normal in size without nodules or tenderness  CARDIOVASCULAR: Regular rate and rhythm. No murmurs, rubs, or gallops.   LUNGS: Clear to auscultation bilaterally   EXTREMITIES: No clubbing, cyanosis, or edema.   NEUROLOGICAL: Cranial nerves II-XII are grossly intact   Symmetric reflexes at the patella no proximal muscle weakness, No visible tremor with both outstretched hands  LYMPH: No cervical, supraclavicular,  adenopathy palpated.   SKIN: No rashes, lesions. Turgor is normal.  FOOT: Normal sensation to monofilament testing, normal pulses, no ulcers.  Normal Vibration quantitative sensation test.    Labs:  Lab Results   Component Value Date/Time    HBA1C 5.7 (A) 12/08/2021 1520    AVGLUC 111 04/28/2021 0937       Lab Results   Component Value Date/Time    SODIUM 137 11/10/2021 11:43 AM    POTASSIUM 3.5 (L) 11/10/2021 11:43 AM    CHLORIDE 95 (L) 11/10/2021 11:43 AM    CO2 30 11/10/2021 11:43 AM    ANION 12.0 11/10/2021 11:43 AM    GLUCOSE 124 (H) 11/10/2021 11:43 AM    BUN 10 11/10/2021 11:43 AM    CREATININE 3.81 (H) 11/10/2021 11:43 AM    CALCIUM 9.6 11/10/2021 11:43 AM    ASTSGOT 18 11/10/2021 11:43 AM    ALTSGPT 13 11/10/2021 11:43 AM    TBILIRUBIN 0.3 11/10/2021 11:43 AM    ALBUMIN  4.1 11/10/2021 11:43 AM    TOTPROTEIN 6.8 11/10/2021 11:43 AM    GLOBULIN 2.7 11/10/2021 11:43 AM    AGRATIO 1.5 11/10/2021 11:43 AM       Lab Results   Component Value Date/Time    CHOLSTRLTOT 125 09/29/2020 1056    TRIGLYCERIDE 113 09/29/2020 1056    HDL 48 09/29/2020 1056    LDL 54 09/29/2020 1056       Lab Results   Component Value Date/Time    MALBCRT 6207 (H) 09/29/2020 10:55 AM    MICROALBUR 104.4 09/29/2020 10:55 AM        Lab Results   Component Value Date/Time    TSHULTRASEN 4.150 08/12/2021 0201     Lab Results   Component Value Date/Time    FREEDIR 1.52 01/28/2021 0700         ASSESSMENT/PLAN:   1. Controlled type 2 diabetes mellitus with chronic kidney disease on chronic dialysis, without long-term current use of insulin (HCC)  See HPI  Stable  Discussed with patient importance of checking her blood sugars at home.   She declined wanting to check her blood sugars at home at this time.   Continue taking Actos 30 mg daily    Please do the following lab as soon as possible while fasting  - Lipid Profile; Future    2. Subclinical hypothyroidism  Clinically Stable, although TSH in the upper normal range, she denies any s/s except for hair loss  Continue taking Levothyroxine 50mcg daily  Discussed importance of taking on empty stomach w/o any iron, calcim, antiacids, multivitamins at least 4 hours of taking thyroid medication  I will update labs    Please do the following labs as soon as possible, so I can adjust medication or not accordingly  - TSH; Future  - FREE THYROXINE; Future    3. Hypertension, unspecified type  Stable  See HPI  Continue taking blood pressure medication    4. Hyperlipidemia, unspecified hyperlipidemia type  I will update labs    - Lipid Profile; Future    5. Vitamin D deficiency  I will update labs  - VITAMIN D,25 HYDROXY; Future    Thank you kindly for allowing me to participate in the diabetes care plan for this patient.    MALGORZATA Guzmán.  12/08/21    CC:   Kathy FAJARDO  MALGORZATA Melgar.

## 2021-12-29 RX ORDER — ROPINIROLE 1 MG/1
TABLET, FILM COATED ORAL
Qty: 180 TABLET | Refills: 1 | Status: SHIPPED | OUTPATIENT
Start: 2021-12-29 | End: 2022-06-27

## 2022-01-24 ENCOUNTER — APPOINTMENT (OUTPATIENT)
Dept: RADIOLOGY | Facility: MEDICAL CENTER | Age: 72
DRG: 291 | End: 2022-01-24
Attending: EMERGENCY MEDICINE
Payer: MEDICARE

## 2022-01-24 ENCOUNTER — HOSPITAL ENCOUNTER (INPATIENT)
Facility: MEDICAL CENTER | Age: 72
LOS: 2 days | DRG: 291 | End: 2022-01-27
Attending: EMERGENCY MEDICINE | Admitting: HOSPITALIST
Payer: MEDICARE

## 2022-01-24 DIAGNOSIS — R79.89 ELEVATED TROPONIN: ICD-10-CM

## 2022-01-24 DIAGNOSIS — R09.02 HYPOXIA: ICD-10-CM

## 2022-01-24 DIAGNOSIS — N18.6 CHRONIC KIDNEY DISEASE ON CHRONIC DIALYSIS (HCC): ICD-10-CM

## 2022-01-24 DIAGNOSIS — R07.9 ACUTE CHEST PAIN: ICD-10-CM

## 2022-01-24 DIAGNOSIS — R79.89 ELEVATED BRAIN NATRIURETIC PEPTIDE (BNP) LEVEL: ICD-10-CM

## 2022-01-24 DIAGNOSIS — E11.9 TYPE 2 DIABETES MELLITUS WITHOUT COMPLICATION, WITHOUT LONG-TERM CURRENT USE OF INSULIN (HCC): ICD-10-CM

## 2022-01-24 DIAGNOSIS — Z99.2 CHRONIC KIDNEY DISEASE ON CHRONIC DIALYSIS (HCC): ICD-10-CM

## 2022-01-24 LAB
ALBUMIN SERPL BCP-MCNC: 3.9 G/DL (ref 3.2–4.9)
ALBUMIN/GLOB SERPL: 1.5 G/DL
ALP SERPL-CCNC: 87 U/L (ref 30–99)
ALT SERPL-CCNC: 15 U/L (ref 2–50)
ANION GAP SERPL CALC-SCNC: 11 MMOL/L (ref 7–16)
APTT PPP: 25.6 SEC (ref 24.7–36)
AST SERPL-CCNC: 19 U/L (ref 12–45)
BASOPHILS # BLD AUTO: 0.8 % (ref 0–1.8)
BASOPHILS # BLD: 0.02 K/UL (ref 0–0.12)
BILIRUB SERPL-MCNC: 0.4 MG/DL (ref 0.1–1.5)
BUN SERPL-MCNC: 14 MG/DL (ref 8–22)
CALCIUM SERPL-MCNC: 9.1 MG/DL (ref 8.4–10.2)
CHLORIDE SERPL-SCNC: 97 MMOL/L (ref 96–112)
CO2 SERPL-SCNC: 29 MMOL/L (ref 20–33)
CREAT SERPL-MCNC: 4.69 MG/DL (ref 0.5–1.4)
D DIMER PPP IA.FEU-MCNC: 1.21 UG/ML (FEU) (ref 0–0.5)
EOSINOPHIL # BLD AUTO: 0.04 K/UL (ref 0–0.51)
EOSINOPHIL NFR BLD: 1.6 % (ref 0–6.9)
ERYTHROCYTE [DISTWIDTH] IN BLOOD BY AUTOMATED COUNT: 53.2 FL (ref 35.9–50)
FLUAV RNA SPEC QL NAA+PROBE: NEGATIVE
FLUBV RNA SPEC QL NAA+PROBE: NEGATIVE
GLOBULIN SER CALC-MCNC: 2.6 G/DL (ref 1.9–3.5)
GLUCOSE SERPL-MCNC: 98 MG/DL (ref 65–99)
HCT VFR BLD AUTO: 26.4 % (ref 37–47)
HGB BLD-MCNC: 8.4 G/DL (ref 12–16)
IMM GRANULOCYTES # BLD AUTO: 0.01 K/UL (ref 0–0.11)
IMM GRANULOCYTES NFR BLD AUTO: 0.4 % (ref 0–0.9)
INR PPP: 1.09 (ref 0.87–1.13)
LYMPHOCYTES # BLD AUTO: 0.55 K/UL (ref 1–4.8)
LYMPHOCYTES NFR BLD: 22.2 % (ref 22–41)
MCH RBC QN AUTO: 30.2 PG (ref 27–33)
MCHC RBC AUTO-ENTMCNC: 31.8 G/DL (ref 33.6–35)
MCV RBC AUTO: 95 FL (ref 81.4–97.8)
MONOCYTES # BLD AUTO: 0.27 K/UL (ref 0–0.85)
MONOCYTES NFR BLD AUTO: 10.9 % (ref 0–13.4)
NEUTROPHILS # BLD AUTO: 1.59 K/UL (ref 2–7.15)
NEUTROPHILS NFR BLD: 64.1 % (ref 44–72)
NRBC # BLD AUTO: 0 K/UL
NRBC BLD-RTO: 0 /100 WBC
NT-PROBNP SERPL IA-MCNC: ABNORMAL PG/ML (ref 0–125)
PLATELET # BLD AUTO: 109 K/UL (ref 164–446)
PMV BLD AUTO: 11 FL (ref 9–12.9)
POTASSIUM SERPL-SCNC: 4.4 MMOL/L (ref 3.6–5.5)
PROT SERPL-MCNC: 6.5 G/DL (ref 6–8.2)
PROTHROMBIN TIME: 13.3 SEC (ref 12–14.6)
RBC # BLD AUTO: 2.78 M/UL (ref 4.2–5.4)
RSV RNA SPEC QL NAA+PROBE: NEGATIVE
SARS-COV-2 RNA RESP QL NAA+PROBE: NOTDETECTED
SODIUM SERPL-SCNC: 137 MMOL/L (ref 135–145)
SPECIMEN SOURCE: NORMAL
TROPONIN T SERPL-MCNC: 57 NG/L (ref 6–19)
WBC # BLD AUTO: 2.5 K/UL (ref 4.8–10.8)

## 2022-01-24 PROCEDURE — 700117 HCHG RX CONTRAST REV CODE 255: Performed by: EMERGENCY MEDICINE

## 2022-01-24 PROCEDURE — 94760 N-INVAS EAR/PLS OXIMETRY 1: CPT

## 2022-01-24 PROCEDURE — 85379 FIBRIN DEGRADATION QUANT: CPT

## 2022-01-24 PROCEDURE — A9270 NON-COVERED ITEM OR SERVICE: HCPCS

## 2022-01-24 PROCEDURE — 700102 HCHG RX REV CODE 250 W/ 637 OVERRIDE(OP)

## 2022-01-24 PROCEDURE — 85610 PROTHROMBIN TIME: CPT

## 2022-01-24 PROCEDURE — 93005 ELECTROCARDIOGRAM TRACING: CPT | Performed by: EMERGENCY MEDICINE

## 2022-01-24 PROCEDURE — 93005 ELECTROCARDIOGRAM TRACING: CPT

## 2022-01-24 PROCEDURE — 0241U HCHG SARS-COV-2 COVID-19 NFCT DS RESP RNA 4 TRGT MIC: CPT

## 2022-01-24 PROCEDURE — 85730 THROMBOPLASTIN TIME PARTIAL: CPT

## 2022-01-24 PROCEDURE — 84484 ASSAY OF TROPONIN QUANT: CPT

## 2022-01-24 PROCEDURE — C9803 HOPD COVID-19 SPEC COLLECT: HCPCS | Performed by: EMERGENCY MEDICINE

## 2022-01-24 PROCEDURE — 71275 CT ANGIOGRAPHY CHEST: CPT | Mod: ME

## 2022-01-24 PROCEDURE — 36415 COLL VENOUS BLD VENIPUNCTURE: CPT

## 2022-01-24 PROCEDURE — 99285 EMERGENCY DEPT VISIT HI MDM: CPT

## 2022-01-24 PROCEDURE — 83880 ASSAY OF NATRIURETIC PEPTIDE: CPT

## 2022-01-24 PROCEDURE — 80053 COMPREHEN METABOLIC PANEL: CPT

## 2022-01-24 PROCEDURE — 85025 COMPLETE CBC W/AUTO DIFF WBC: CPT

## 2022-01-24 RX ORDER — ASPIRIN 81 MG/1
81 TABLET, CHEWABLE ORAL DAILY
Status: DISCONTINUED | OUTPATIENT
Start: 2022-01-25 | End: 2022-01-24

## 2022-01-24 RX ORDER — ASPIRIN 81 MG/1
81 TABLET, CHEWABLE ORAL DAILY
Status: DISCONTINUED | OUTPATIENT
Start: 2022-01-24 | End: 2022-01-27 | Stop reason: HOSPADM

## 2022-01-24 RX ADMIN — IOHEXOL 60 ML: 350 INJECTION, SOLUTION INTRAVENOUS at 23:58

## 2022-01-24 RX ADMIN — ASPIRIN 81 MG CHEWABLE TABLET 81 MG: 81 TABLET CHEWABLE at 23:00

## 2022-01-24 ASSESSMENT — FIBROSIS 4 INDEX: FIB4 SCORE: 3.21

## 2022-01-25 PROBLEM — J81.0 ACUTE PULMONARY EDEMA (HCC): Status: ACTIVE | Noted: 2022-01-25

## 2022-01-25 PROBLEM — R07.89 ATYPICAL CHEST PAIN: Status: ACTIVE | Noted: 2022-01-25

## 2022-01-25 PROBLEM — I50.31 ACUTE DIASTOLIC CHF (CONGESTIVE HEART FAILURE) (HCC): Status: ACTIVE | Noted: 2022-01-25

## 2022-01-25 PROBLEM — D63.8 ANEMIA, CHRONIC DISEASE: Status: ACTIVE | Noted: 2022-01-25

## 2022-01-25 LAB
EKG IMPRESSION: NORMAL
GLUCOSE BLD-MCNC: 148 MG/DL (ref 65–99)
GLUCOSE BLD-MCNC: 87 MG/DL (ref 65–99)
GLUCOSE BLD-MCNC: 89 MG/DL (ref 65–99)
GLUCOSE BLD-MCNC: 96 MG/DL (ref 65–99)
HAV IGM SERPL QL IA: NORMAL
HBV CORE IGM SER QL: NORMAL
HBV SURFACE AB SERPL IA-ACNC: 10.17 MIU/ML (ref 0–10)
HBV SURFACE AG SER QL: NORMAL
HCV AB SER QL: NORMAL
TROPONIN T SERPL-MCNC: 53 NG/L (ref 6–19)
TROPONIN T SERPL-MCNC: 53 NG/L (ref 6–19)
TROPONIN T SERPL-MCNC: 55 NG/L (ref 6–19)

## 2022-01-25 PROCEDURE — 99223 1ST HOSP IP/OBS HIGH 75: CPT | Performed by: INTERNAL MEDICINE

## 2022-01-25 PROCEDURE — 700111 HCHG RX REV CODE 636 W/ 250 OVERRIDE (IP): Performed by: INTERNAL MEDICINE

## 2022-01-25 PROCEDURE — 80074 ACUTE HEPATITIS PANEL: CPT

## 2022-01-25 PROCEDURE — A9270 NON-COVERED ITEM OR SERVICE: HCPCS | Performed by: HOSPITALIST

## 2022-01-25 PROCEDURE — 700102 HCHG RX REV CODE 250 W/ 637 OVERRIDE(OP): Performed by: HOSPITALIST

## 2022-01-25 PROCEDURE — 99223 1ST HOSP IP/OBS HIGH 75: CPT | Mod: AI | Performed by: HOSPITALIST

## 2022-01-25 PROCEDURE — 700111 HCHG RX REV CODE 636 W/ 250 OVERRIDE (IP): Performed by: HOSPITALIST

## 2022-01-25 PROCEDURE — 86706 HEP B SURFACE ANTIBODY: CPT

## 2022-01-25 PROCEDURE — 82962 GLUCOSE BLOOD TEST: CPT | Mod: 91

## 2022-01-25 PROCEDURE — 84484 ASSAY OF TROPONIN QUANT: CPT | Mod: 91

## 2022-01-25 PROCEDURE — 770020 HCHG ROOM/CARE - TELE (206)

## 2022-01-25 PROCEDURE — 90935 HEMODIALYSIS ONE EVALUATION: CPT

## 2022-01-25 PROCEDURE — 5A1D70Z PERFORMANCE OF URINARY FILTRATION, INTERMITTENT, LESS THAN 6 HOURS PER DAY: ICD-10-PCS | Performed by: INTERNAL MEDICINE

## 2022-01-25 RX ORDER — ACETAMINOPHEN 325 MG/1
650 TABLET ORAL EVERY 6 HOURS PRN
Status: DISCONTINUED | OUTPATIENT
Start: 2022-01-25 | End: 2022-01-27 | Stop reason: HOSPADM

## 2022-01-25 RX ORDER — POLYETHYLENE GLYCOL 3350 17 G/17G
1 POWDER, FOR SOLUTION ORAL
Status: DISCONTINUED | OUTPATIENT
Start: 2022-01-25 | End: 2022-01-27 | Stop reason: HOSPADM

## 2022-01-25 RX ORDER — LEVOTHYROXINE SODIUM 0.05 MG/1
50 TABLET ORAL
Status: DISCONTINUED | OUTPATIENT
Start: 2022-01-25 | End: 2022-01-27 | Stop reason: HOSPADM

## 2022-01-25 RX ORDER — CLOPIDOGREL BISULFATE 75 MG/1
75 TABLET ORAL DAILY
Status: DISCONTINUED | OUTPATIENT
Start: 2022-01-25 | End: 2022-01-25

## 2022-01-25 RX ORDER — LISINOPRIL 20 MG/1
40 TABLET ORAL DAILY
Status: DISCONTINUED | OUTPATIENT
Start: 2022-01-25 | End: 2022-01-27 | Stop reason: HOSPADM

## 2022-01-25 RX ORDER — PREGABALIN 25 MG/1
25 CAPSULE ORAL DAILY
Status: DISCONTINUED | OUTPATIENT
Start: 2022-01-25 | End: 2022-01-27 | Stop reason: HOSPADM

## 2022-01-25 RX ORDER — ONDANSETRON 4 MG/1
4 TABLET, ORALLY DISINTEGRATING ORAL EVERY 4 HOURS PRN
Status: DISCONTINUED | OUTPATIENT
Start: 2022-01-25 | End: 2022-01-27 | Stop reason: HOSPADM

## 2022-01-25 RX ORDER — ONDANSETRON 2 MG/ML
4 INJECTION INTRAMUSCULAR; INTRAVENOUS EVERY 4 HOURS PRN
Status: DISCONTINUED | OUTPATIENT
Start: 2022-01-25 | End: 2022-01-27 | Stop reason: HOSPADM

## 2022-01-25 RX ORDER — ROPINIROLE 1 MG/1
1 TABLET, FILM COATED ORAL
Status: DISCONTINUED | OUTPATIENT
Start: 2022-01-25 | End: 2022-01-27 | Stop reason: HOSPADM

## 2022-01-25 RX ORDER — BISACODYL 10 MG
10 SUPPOSITORY, RECTAL RECTAL
Status: DISCONTINUED | OUTPATIENT
Start: 2022-01-25 | End: 2022-01-27 | Stop reason: HOSPADM

## 2022-01-25 RX ORDER — SEVELAMER CARBONATE 800 MG/1
800 TABLET, FILM COATED ORAL
Status: DISCONTINUED | OUTPATIENT
Start: 2022-01-25 | End: 2022-01-27 | Stop reason: HOSPADM

## 2022-01-25 RX ORDER — AMOXICILLIN 250 MG
2 CAPSULE ORAL 2 TIMES DAILY
Status: DISCONTINUED | OUTPATIENT
Start: 2022-01-25 | End: 2022-01-27 | Stop reason: HOSPADM

## 2022-01-25 RX ORDER — AMLODIPINE BESYLATE 5 MG/1
10 TABLET ORAL DAILY
Status: DISCONTINUED | OUTPATIENT
Start: 2022-01-25 | End: 2022-01-27 | Stop reason: HOSPADM

## 2022-01-25 RX ORDER — HEPARIN SODIUM 1000 [USP'U]/ML
1500 INJECTION, SOLUTION INTRAVENOUS; SUBCUTANEOUS PRN
Status: DISCONTINUED | OUTPATIENT
Start: 2022-01-25 | End: 2022-01-27 | Stop reason: HOSPADM

## 2022-01-25 RX ORDER — CARVEDILOL 25 MG/1
25 TABLET ORAL DAILY
Status: DISCONTINUED | OUTPATIENT
Start: 2022-01-25 | End: 2022-01-27 | Stop reason: HOSPADM

## 2022-01-25 RX ORDER — HYDRALAZINE HYDROCHLORIDE 25 MG/1
100 TABLET, FILM COATED ORAL DAILY
Status: DISCONTINUED | OUTPATIENT
Start: 2022-01-25 | End: 2022-01-27 | Stop reason: HOSPADM

## 2022-01-25 RX ORDER — MORPHINE SULFATE 4 MG/ML
2 INJECTION INTRAVENOUS
Status: DISCONTINUED | OUTPATIENT
Start: 2022-01-25 | End: 2022-01-27 | Stop reason: HOSPADM

## 2022-01-25 RX ADMIN — CLOPIDOGREL 75 MG: 75 TABLET, FILM COATED ORAL at 08:00

## 2022-01-25 RX ADMIN — SEVELAMER CARBONATE 800 MG: 800 TABLET, FILM COATED ORAL at 12:20

## 2022-01-25 RX ADMIN — APIXABAN 5 MG: 5 TABLET, FILM COATED ORAL at 17:34

## 2022-01-25 RX ADMIN — HYDRALAZINE HYDROCHLORIDE 100 MG: 25 TABLET, FILM COATED ORAL at 08:00

## 2022-01-25 RX ADMIN — SEVELAMER CARBONATE 800 MG: 800 TABLET, FILM COATED ORAL at 10:16

## 2022-01-25 RX ADMIN — SENNOSIDES AND DOCUSATE SODIUM 2 TABLET: 50; 8.6 TABLET ORAL at 17:34

## 2022-01-25 RX ADMIN — ROPINIROLE HYDROCHLORIDE 1 MG: 0.5 TABLET, FILM COATED ORAL at 20:38

## 2022-01-25 RX ADMIN — PREGABALIN 25 MG: 25 CAPSULE ORAL at 08:00

## 2022-01-25 RX ADMIN — SEVELAMER CARBONATE 800 MG: 800 TABLET, FILM COATED ORAL at 17:34

## 2022-01-25 RX ADMIN — LISINOPRIL 40 MG: 20 TABLET ORAL at 10:06

## 2022-01-25 RX ADMIN — AMLODIPINE BESYLATE 10 MG: 5 TABLET ORAL at 08:00

## 2022-01-25 RX ADMIN — CARVEDILOL 25 MG: 25 TABLET, FILM COATED ORAL at 10:07

## 2022-01-25 RX ADMIN — INSULIN HUMAN 2 UNITS: 100 INJECTION, SOLUTION PARENTERAL at 17:34

## 2022-01-25 RX ADMIN — LEVOTHYROXINE SODIUM 50 MCG: 0.05 TABLET ORAL at 06:00

## 2022-01-25 RX ADMIN — NITROGLYCERIN 0.5 INCH: 20 OINTMENT TOPICAL at 12:21

## 2022-01-25 RX ADMIN — HEPARIN SODIUM 1500 UNITS: 1000 INJECTION, SOLUTION INTRAVENOUS; SUBCUTANEOUS at 11:57

## 2022-01-25 RX ADMIN — APIXABAN 5 MG: 5 TABLET, FILM COATED ORAL at 10:07

## 2022-01-25 RX ADMIN — ROPINIROLE HYDROCHLORIDE 1 MG: 0.5 TABLET, FILM COATED ORAL at 02:25

## 2022-01-25 RX ADMIN — NITROGLYCERIN 0.5 INCH: 20 OINTMENT TOPICAL at 06:00

## 2022-01-25 RX ADMIN — MORPHINE SULFATE 2 MG: 4 INJECTION INTRAVENOUS at 18:36

## 2022-01-25 ASSESSMENT — ENCOUNTER SYMPTOMS
CLAUDICATION: 0
FEVER: 0
ABDOMINAL PAIN: 0
BLURRED VISION: 0
HEADACHES: 0
CONSTIPATION: 0
VOMITING: 0
SPEECH CHANGE: 0
DOUBLE VISION: 0
NECK PAIN: 0
DIZZINESS: 0
DIARRHEA: 0
MYALGIAS: 0
NAUSEA: 0
BRUISES/BLEEDS EASILY: 0
HEARTBURN: 0
SORE THROAT: 0
NERVOUS/ANXIOUS: 0
INSOMNIA: 0
SHORTNESS OF BREATH: 0
SENSORY CHANGE: 0
DEPRESSION: 0
PHOTOPHOBIA: 0
SHORTNESS OF BREATH: 1
CHILLS: 0
WEAKNESS: 0
PALPITATIONS: 0
COUGH: 0

## 2022-01-25 ASSESSMENT — CHA2DS2 SCORE
AGE 75 OR GREATER: NO
HYPERTENSION: YES
AGE 65 TO 74: YES
VASCULAR DISEASE: YES
SEX: FEMALE
DIABETES: YES
CHA2DS2 VASC SCORE: 6
CHF OR LEFT VENTRICULAR DYSFUNCTION: YES
PRIOR STROKE OR TIA OR THROMBOEMBOLISM: NO

## 2022-01-25 ASSESSMENT — COGNITIVE AND FUNCTIONAL STATUS - GENERAL
MOBILITY SCORE: 24
SUGGESTED CMS G CODE MODIFIER MOBILITY: CH
SUGGESTED CMS G CODE MODIFIER DAILY ACTIVITY: CH
DAILY ACTIVITIY SCORE: 24

## 2022-01-25 ASSESSMENT — LIFESTYLE VARIABLES
EVER FELT BAD OR GUILTY ABOUT YOUR DRINKING: NO
ALCOHOL_USE: NO
TOTAL SCORE: 0
ON A TYPICAL DAY WHEN YOU DRINK ALCOHOL HOW MANY DRINKS DO YOU HAVE: 0
AVERAGE NUMBER OF DAYS PER WEEK YOU HAVE A DRINK CONTAINING ALCOHOL: 0
TOTAL SCORE: 0
TOTAL SCORE: 0
CONSUMPTION TOTAL: NEGATIVE
HAVE YOU EVER FELT YOU SHOULD CUT DOWN ON YOUR DRINKING: NO
HAVE PEOPLE ANNOYED YOU BY CRITICIZING YOUR DRINKING: NO
EVER HAD A DRINK FIRST THING IN THE MORNING TO STEADY YOUR NERVES TO GET RID OF A HANGOVER: NO
HOW MANY TIMES IN THE PAST YEAR HAVE YOU HAD 5 OR MORE DRINKS IN A DAY: 0

## 2022-01-25 ASSESSMENT — FIBROSIS 4 INDEX: FIB4 SCORE: 3.24

## 2022-01-25 ASSESSMENT — PAIN DESCRIPTION - PAIN TYPE
TYPE: ACUTE PAIN
TYPE: ACUTE PAIN;CHRONIC PAIN

## 2022-01-25 NOTE — ASSESSMENT & PLAN NOTE
-On admission hemoglobin is 8.4 which is much lower than her baseline around 11.  There is no history of bleeding and I will closely monitor her CBC in the morning.

## 2022-01-25 NOTE — ASSESSMENT & PLAN NOTE
-Inpatient status to telemetry unit.  -CT showing pulmonary edema and her proBNP is 02580 on admission.  She has bilateral lower extremity pitting edema.  -She is a dialysis patient, currently goes to dialysis sessions on Monday, Wednesdays and Fridays.  She follows with Dr. Villar and will call nephrology team to arrange nonemergency hemodialysis for her in the morning.  -She had cardiac Cath done in June 2021 for prerenal transplant evaluation showing EF of 70%.  -She takes ACE inhibitor, lisinopril that I will continue.  -We will continue to do serial cardiac enzymes.  At baseline her troponin is around 60 and on admission 57 ng/L.  -I am adding morphine for chest pain.  -Continue Aspirin and Plavix

## 2022-01-25 NOTE — ASSESSMENT & PLAN NOTE
-Patient with history of CAD status post RCA PCI.  -She had a cardiac cath done on 6/8/2021 for prerenal transplant evaluation which demonstrated nonobstructive coronary artery disease and fully patent placed RCA stent with EF of 70%.  -She has chronically mildly elevated troponin given underlying chronic kidney disease and her troponin on admission is at baseline.  -She had multiple admissions with chest pain in the past.  -Chest pain likely secondary to fluid overload and pulmonary edema but will monitor her troponin levels overnight and treat her pain with morphine for now.  Her blood pressure is also elevated and I am giving her Nitropaste for this.  -I will monitor her on cardiac unit

## 2022-01-25 NOTE — ED PROVIDER NOTES
ED Provider Note     Scribed for Gial Willis D.O. by Froylan Pinto. 1/24/2022, 10:05 PM.     Primary care provider: ANGELITA Concepcion  Means of arrival: Walk-in         History obtained from: Patient & Daughter-in-law  History limited by: None    CHIEF COMPLAINT  Chief Complaint   Patient presents with   • Chest Pain      x several hrs Denies Cough or fever pt fully vaccinated against COVID Hx CKD   • Shortness of Breath   • Leg Swelling     Mild bilat ankle edema noted this am     HPI  Tere Gallegos is a 71 y.o.dialysis patient who presents to the emergency Department for acute, mild chest pain onset 3 days ago. Per daughter in law, the patient has been experiencing worsening left sided chest pain. Denies fever, cough, nausea, vomiting, diaphoresis, or difficulty breathing. Movement exacerbates her pain. Patient says she is on dialysis secondary to diabetes. Denies allergies.    REVIEW OF SYSTEMS  Pertinent positives include chest pain. Pertinent negatives include no fever, cough, nausea, vomiting, diaphoresis, or difficulty breathing.   See HPI for further details. All other systems are negative.    PAST MEDICAL HISTORY  Past Medical History:   Diagnosis Date   • Acquired hypothyroidism 5/4/2020   • CAD (coronary artery disease)    • Chronic diastolic heart failure (HCC) 5/4/2020   • Coronary artery disease due to lipid rich plaque     2 Synergy NOEMI to 100% RCA stent placed   • Dental disorder     partial dentures- uppers   • Diabetes ([facility])     oral medication   • ESRD (end stage renal disease) on dialysis ([facility]) 5/4/2020   • Hemodialysis patient ([facility])     M, W, F   • Hyperlipidemia    • Hypertension    • Kidney transplant candidate    • Presence of drug-eluting stent in right coronary artery    • QT prolongation 1/22/2020   • RLS (restless legs syndrome) 8/5/2016   • Transaminitis 12/22/2018       FAMILY HISTORY  Family History   Problem Relation Age of Onset   • Diabetes Sister    • Other  Sister         liver disease   • Diabetes Brother    • Heart Disease Neg Hx        SOCIAL HISTORY  Social History     Tobacco Use   • Smoking status: Never Smoker   • Smokeless tobacco: Never Used   Vaping Use   • Vaping Use: Never used   Substance Use Topics   • Alcohol use: No     Alcohol/week: 0.0 oz   • Drug use: No      Social History     Substance and Sexual Activity   Drug Use No       SURGICAL HISTORY  Past Surgical History:   Procedure Laterality Date   • Z CARDIAC CATH  9/7/2016    RCA stented with 2 Synergy drug-eluting stents.   • RECOVERY  8/16/2016    Procedure: CATH LAB [facility] WITH POSSIBLE DR. CASTILLO;  Surgeon: [facility];  Location: SURGERY PRE-POST PROC UNIT [facility];  Service:    • Z CARDIAC CATH  8/16/16    100% RCA   • OTHER Left 2014    left arm upper extremity fistula   • OTHER ABDOMINAL SURGERY      left kidney removed due to cancer       CURRENT MEDICATIONS  No current facility-administered medications for this encounter.    Current Outpatient Medications:   •  ROPINIRole (REQUIP) 1 MG Tab, TOME FERNANDO TABLETA DOS VECES AL DESMOND, Disp: 180 Tablet, Rfl: 1  •  pregabalin (LYRICA) 25 MG Cap, Take 25 mg by mouth 3 times a day., Disp: , Rfl:   •  amLODIPine (NORVASC) 10 MG Tab, TOME FERNANDO TABLETA TODOS LOS MCCORMICK (Patient taking differently: Take 10 mg by mouth every day.), Disp: 90 Tablet, Rfl: 3  •  clopidogrel (PLAVIX) 75 MG Tab, TOME FERNANDO TABLETA TODOS [place] MCCORMICK (Patient taking differently: Take 75 mg by mouth every day.), Disp: 90 Tablet, Rfl: 3  •  meclizine (ANTIVERT) 25 MG Tab, Take 1 Tablet by mouth 3 times a day as needed for Vertigo., Disp: 30 Tablet, Rfl: 0  •  ondansetron (ZOFRAN ODT) 4 MG TABLET DISPERSIBLE, Take 1 Tablet by mouth every 6 hours as needed for Nausea., Disp: 10 Tablet, Rfl: 0  •  sevelamer carbonate (RENVELA) 800 MG Tab tablet, Take 1 Tablet by mouth 3 times a day with meals., Disp: 270 Tablet, Rfl: 3  •  apixaban (ELIQUIS) 2.5mg Tab, Take 1 Tablet by mouth 2 times a  day. Indications: Thromboembolism secondary to Atrial Fibrillation, Disp: 60 Tablet, Rfl: 2  •  carvedilol (COREG) 25 MG Tab, TOME FERNANDO TABLETA DOS VECES AL DESMOND CON LAS COMIDAS (Patient taking differently: Take 25 mg by mouth every day. Per pt only takes once a day, makes her feel sick), Disp: 180 tablet, Rfl: 3  •  lisinopril (PRINIVIL) 40 MG tablet, Take 1 tablet by mouth every day., Disp: 90 tablet, Rfl: 3  •  hydrALAZINE (APRESOLINE) 100 MG tablet, Take 1 tablet by mouth 2 times a day. (Patient taking differently: Take 100 mg by mouth every day. Per pt only takes once a day, makes her feel sick), Disp: 180 tablet, Rfl: 3  •  pioglitazone (ACTOS) 30 MG Tab, Take 1 Tab by mouth every day., Disp: 90 Tab, Rfl: 3  •  levothyroxine (SYNTHROID) 50 MCG Tab, Take 1 Tab by mouth Every morning on an empty stomach., Disp: 90 Tab, Rfl: 3    ALLERGIES  No Known Allergies    PHYSICAL EXAM  VITAL SIGNS: BP (!) 170/63   Pulse 73   Temp 37.3 °C (99.1 °F) (Temporal)   Resp 16   Ht 1.524 m (5')   Wt 66 kg (145 lb 8.1 oz)   LMP  (LMP Unknown)   SpO2 (!) 86%   BMI 28.42 kg/m²     Constitutional: Patient is well developed, well nourished. Non-toxic appearing.Mild distress.  HENT: Normocephalic, atraumatic.  Moist oral mucosa.  Cardiovascular: Normal heart rate and Regular rhythm. No murmur.  Thorax & Lungs: Clear and equal breath sounds with good excursion. No respiratory distress, no rhonchi, wheezing or rales. Palpable tenderness in the left chest wall. No contusions, abrasions, or rashes.  Abdomen: Bowel sounds normal in all four quadrants. Soft, nontender, no rebound , guarding, palpable masses.   Skin: Warm, Dry, No erythema, No contusions, abrasions, or rashes.   Extremities: Peripheral pulses 4/4 No edema  Neurologic: Alert & oriented x 3, Normal motor function, Normal sensory function.  Psychiatric: Affect normal, Judgment normal, Mood normal.    DIAGNOSTICS/PROCEDURES    LABS  Results for orders placed or performed  during the hospital encounter of 01/24/22   CBC WITH DIFFERENTIAL   Result Value Ref Range    WBC 2.5 (LL) 4.8 - 10.8 K/uL    RBC 2.78 (L) 4.20 - 5.40 M/uL    Hemoglobin 8.4 (L) 12.0 - 16.0 g/dL    Hematocrit 26.4 (L) 37.0 - 47.0 %    MCV 95.0 81.4 - 97.8 fL    MCH 30.2 27.0 - 33.0 pg    MCHC 31.8 (L) 33.6 - 35.0 g/dL    RDW 53.2 (H) 35.9 - 50.0 fL    Platelet Count 109 (L) 164 - 446 K/uL    MPV 11.0 9.0 - 12.9 fL    Neutrophils-Polys 64.10 44.00 - 72.00 %    Lymphocytes 22.20 22.00 - 41.00 %    Monocytes 10.90 0.00 - 13.40 %    Eosinophils 1.60 0.00 - 6.90 %    Basophils 0.80 0.00 - 1.80 %    Immature Granulocytes 0.40 0.00 - 0.90 %    Nucleated RBC 0.00 /100 WBC    Neutrophils (Absolute) 1.59 (L) 2.00 - 7.15 K/uL    Lymphs (Absolute) 0.55 (L) 1.00 - 4.80 K/uL    Monos (Absolute) 0.27 0.00 - 0.85 K/uL    Eos (Absolute) 0.04 0.00 - 0.51 K/uL    Baso (Absolute) 0.02 0.00 - 0.12 K/uL    Immature Granulocytes (abs) 0.01 0.00 - 0.11 K/uL    NRBC (Absolute) 0.00 K/uL   COMP METABOLIC PANEL   Result Value Ref Range    Sodium 137 135 - 145 mmol/L    Potassium 4.4 3.6 - 5.5 mmol/L    Chloride 97 96 - 112 mmol/L    Co2 29 20 - 33 mmol/L    Anion Gap 11.0 7.0 - 16.0    Glucose 98 65 - 99 mg/dL    Bun 14 8 - 22 mg/dL    Creatinine 4.69 (H) 0.50 - 1.40 mg/dL    Calcium 9.1 8.4 - 10.2 mg/dL    AST(SGOT) 19 12 - 45 U/L    ALT(SGPT) 15 2 - 50 U/L    Alkaline Phosphatase 87 30 - 99 U/L    Total Bilirubin 0.4 0.1 - 1.5 mg/dL    Albumin 3.9 3.2 - 4.9 g/dL    Total Protein 6.5 6.0 - 8.2 g/dL    Globulin 2.6 1.9 - 3.5 g/dL    A-G Ratio 1.5 g/dL   TROPONIN   Result Value Ref Range    Troponin T 57 (H) 6 - 19 ng/L   APTT   Result Value Ref Range    APTT 25.6 24.7 - 36.0 sec   PROTHROMBIN TIME (INR)   Result Value Ref Range    PT 13.3 12.0 - 14.6 sec    INR 1.09 0.87 - 1.13   proBrain Natriuretic Peptide, NT   Result Value Ref Range    NT-proBNP 45911 (H) 0 - 125 pg/mL   D-DIMER   Result Value Ref Range    D-Dimer Screen 1.21 (H) 0.00 -  0.50 ug/mL (FEU)   COV-2, FLU A/B, AND RSV BY PCR (2-4 HOURS CEPHEID): Collect NP swab in VTM    Specimen: Respirate   Result Value Ref Range    Influenza virus A RNA Negative Negative    Influenza virus B, PCR Negative Negative    RSV, PCR Negative Negative    SARS-CoV-2 by PCR NotDetected     SARS-CoV-2 Source NP Swab    ESTIMATED GFR   Result Value Ref Range    GFR If  11 (A) >60 mL/min/1.73 m 2    GFR If Non African American 9 (A) >60 mL/min/1.73 m 2   EKG   Result Value Ref Range    Report       [facility] Emergency Dept.    Test Date:  2022  Pt Name:    DIOR NICHOLS    Department: [facility]  MRN:        [id number]                      [facility]  Gender:     Female                       Technician: [id number]  :        [id number]                   Requested By:ER TRIAGE PROTOCOL  Order #:    [id number]                    Reading MD: VADIM DIEHL DO    Measurements  Intervals                                Axis  Rate:       70                           P:          -34  NJ:         164                          QRS:        94  QRSD:       80                           T:  QT:         420  QTc:        454    Interpretive Statements  SINUS RHYTHM  RIGHT AXIS DEVIATION  CONSIDER LEFT VENTRICULAR HYPERTROPHY  BORDERLINE T ABNORMALITIES, INFERIOR LEADS  BASELINE WANDER IN LEAD(S) V1  Compared to ECG 11/10/2021 15:09:50  Right-axis deviation now present  T-wave abnormality now present  Electronically Signed On 2022 1:07:44 PST by VADIM DIEHL DO       Labs reviewed by me    EKG  12 Lead EKG interpreted by me as shown above.    RADIOLOGY/PROCEDURES  CT-CTA CHEST PULMONARY ARTERY W/ RECONS   Final Result      No CT evidence of pulmonary thromboembolism      Multichamber cardiac enlargement with pulmonary edema, coronary artery disease, small to medium-sized bilateral pleural effusions      7 mm right middle lobe, 6 mm left lower lobe and a smaller  noncalcified pulmonary nodules. Mild mediastinal adenopathy. Follow recommendations as below      Low Risk: CT at 3-6 months, then consider CT at 18-24 months      High Risk: CT at 3-6 months, then at 18-24 months      Comments: Use most suspicious nodule as guide to management. Follow-up intervals may vary according to size and risk.      Low Risk - Minimal or absent history of smoking and of other known risk factors.      High Risk - History of smoking or of other known risk factors.      Note: These recommendations do not apply to lung cancer screening, patients with immunosuppression, or patients with known primary cancer.      Fleischner Society 2017 Guidelines for Management of Incidentally Detected Pulmonary Nodules in Adults        Results and radiologist interpretation reviewed by me.     COURSE & MEDICAL DECISION MAKING  Pertinent Labs & Imaging studies reviewed. (See chart for details)    10:05 PM - Patient seen and evaluated at bedside. Ordered for DX-Chest, COV-2, Flu A/B, RSV, proBrain Natriuretic Peptide, D-Dimer, CBC w/ Diff, CMP, Troponin, APTT, Prothrombin Time, and EKG to evaluate. Patient will be treated with aspirin 81 mg PO for her symptoms. Differential diagnoses include, but are not limited to, Chest Wall Pain vs. Cardiac vs. COVID    11:03 PM - Patient was reevaluated at bedside. Her pain is currently intermittent but says that she feels improved at this time.  Patient's labs reveal an elevated BNP of 31,321, D-dimer is elevated at 1.21, troponin is 57.  Remaining labs are unremarkable other than an elevated BUN and creatinine consistent with chronic kidney disease 14 and 4.69.  Patient was treated with aspirin, she will need to be dialyzed.   CTA chest to rule out pulmonary embolus was performed and found to be unremarkable.  Patient will need to be admitted into the hospital for dialysis as well as extra volume removal since she is in fluid overload at this time.  She will be maintained on  oxygen therapy until her hypoxemia resolved.  She is in guarded condition.  12:35 AM - Paged Hospitalist    12:38 AM - I discussed the patient's case and the above findings with Dr. Phillips (Hospitalist) who agrees to admit the patient for further evaluation.       DISPOSITION:  Patient will be hospitalized by Dr. Phillips in guarded condition.    FINAL IMPRESSION  1. Elevated troponin    2. Acute chest pain    3. Hypoxia    4. Chronic kidney disease on chronic dialysis (HCC)    5. Type 2 diabetes mellitus without complication, without long-term current use of insulin (HCC)    6. Elevated brain natriuretic peptide (BNP) level      Froylan BRONSON (Scribe), am scribing for, and in the presence of, Gila Willis D.O..    Electronically signed by: Froylan Pinto (Scribe), 1/24/2022    IGila D.O. personally performed the services described in this documentation, as scribed by Froylan Pinto in my presence, and it is both accurate and complete.    The note accurately reflects work and decisions made by me.  Gila Willis D.O.  1/25/2022  3:12 AM

## 2022-01-25 NOTE — ASSESSMENT & PLAN NOTE
-Resume hydralazine, carvedilol, lisinopril and amlodipine  -Should improve with volume reduction with HD today.

## 2022-01-25 NOTE — PROGRESS NOTES
Hd treatment ordered by Dr Fernando started at 1157 and ended at 1457 with net UF of 3 Liters as bp tolerated. Cannulated left upper arm AVF x 2 using 15 gauge needles without problem. Prior to HD, pt had to transfer from ER 6 to ER 12 due to water hook up issues. Had to call Dr Fernando for HD orders. Episode of cramping while on HD, Dr Fernando was aware. Held sites for 20 minutes on both ports post treatment. See flow sheet for details

## 2022-01-25 NOTE — ASSESSMENT & PLAN NOTE
-WBC 2.5, Hgb 8.4 and Platelets 109. Chronic issue and she has followed with Heme in the past. Monitor CBC

## 2022-01-25 NOTE — ED NOTES
Med per admit orders, lunch tray ordered, plan of care reviewed using interpretor mendez # [id number]

## 2022-01-25 NOTE — DISCHARGE PLANNING
Outpatient Dialysis Note    Confirmed patient is active at:    [facility]    [address]  [place] [address]    Schedule: Monday, Wednesday, Friday   Time: 05:15am    Patient is seen by Dr. Villar in HD clinic.    Spoke with Sherice at facility who confirmed.    Forwarded records for review.    Vicki Rodriguez- Dialysis Coordinator Ph# [phone]  [facility] [employer]

## 2022-01-25 NOTE — PROGRESS NOTES
[facility] Medicine Daily Progress Note    Date of Service  1/25/2022    Chief Complaint  Tere Gallegos is a 71 y.o. female admitted 1/24/2022 with increased shortness of breath and chest pain.    Hospital Course Tere Gallegos is a 71 y.o. female, with h/o ESRD on HD (M/W/F, Dr. Villar) due to h/o Left Kidney Cancer s/p Nephrectomy and DM awaiting for renal transplant. Also has h/o CAD s/p RCA PCI (2018), Afib on Eliquis, HTN and HLD. She  presented to the ER on 1/24/2022 complaining of worsening chest pain, bilateral lower extremity edema and shortness of breath.  No cough or fever and she is fully vaccinated against COVID-19.  Her pain is on the mid chest and is started 3 days ago, but today worse in severity and mostly on the left side of the chest.  She does not have nausea, vomiting, diaphoresis.  Pain is worse with exertion. Patient had HD session earlier in the day and did not miss any sessions    Interval Problem Update  1/25  Patient with chest discomfort yesterday and shortness of breath for the past 3 days.  She has ESRD and HD MWF without missing sessions but she is completely anuric.  Imaging is showing pulmonary edema and BNP 32k consistent with volume overload.  I reached out to Dr Fernando, on call for Dr Villar and HD being arranged today and again tomorrow.     I have personally seen and examined the patient at bedside. I discussed the plan of care with patient, family, bedside RN, charge RN and .    Consultants/Specialty  nephrology    Code Status  Full Code    Disposition  Patient is not medically cleared for discharge.   Anticipate discharge to to home with close outpatient follow-up.  I have placed the appropriate orders for post-discharge needs.    Review of Systems  Review of Systems   Constitutional: Negative for chills and fever.   HENT: Negative for congestion and sore throat.    Eyes: Negative for blurred vision and photophobia.   Respiratory: Positive for  shortness of breath. Negative for cough.    Cardiovascular: Negative for chest pain, claudication and leg swelling.   Gastrointestinal: Negative for abdominal pain, constipation, diarrhea, heartburn, nausea and vomiting.   Genitourinary: Negative for dysuria and hematuria.   Musculoskeletal: Negative for joint pain and myalgias.   Skin: Negative for itching and rash.   Neurological: Negative for dizziness, sensory change, speech change, weakness and headaches.   Psychiatric/Behavioral: Negative for depression. The patient is not nervous/anxious and does not have insomnia.         Physical Exam  Temp:  [36.3 °C (97.3 °F)-37.3 °C (99.1 °F)] 36.3 °C (97.3 °F)  Pulse:  [63-73] 66  Resp:  [16-23] 16  BP: (128-177)/(52-73) 140/57  SpO2:  [86 %-98 %] 97 %    Physical Exam  Vitals and nursing note reviewed.   Constitutional:       General: She is not in acute distress.     Appearance: Normal appearance. She is not ill-appearing.   HENT:      Head: Normocephalic and atraumatic.      Nose: Nose normal.   Cardiovascular:      Rate and Rhythm: Normal rate and regular rhythm.      Heart sounds: Normal heart sounds. No murmur heard.      Pulmonary:      Effort: Pulmonary effort is normal.      Breath sounds: Normal breath sounds.      Comments: Decreased BS at bilateral bases  Abdominal:      General: Bowel sounds are normal. There is no distension.      Palpations: Abdomen is soft.   Musculoskeletal:         General: No swelling or tenderness.      Cervical back: Neck supple.      Right lower leg: Edema present.      Left lower leg: Edema present.   Skin:     General: Skin is warm and dry.   Neurological:      General: No focal deficit present.      Mental Status: She is alert and oriented to person, place, and time.   Psychiatric:         Mood and Affect: Mood normal.         Fluids  No intake or output data in the 24 hours ending 01/25/22 1355    Laboratory  Recent Labs     01/24/22  2229   WBC 2.5*   RBC 2.78*   HEMOGLOBIN  8.4*   HEMATOCRIT 26.4*   MCV 95.0   MCH 30.2   MCHC 31.8*   RDW 53.2*   PLATELETCT 109*   MPV 11.0     Recent Labs     01/24/22  2229   SODIUM 137   POTASSIUM 4.4   CHLORIDE 97   CO2 29   GLUCOSE 98   BUN 14   CREATININE 4.69*   CALCIUM 9.1     Recent Labs     01/24/22  2229   APTT 25.6   INR 1.09               Imaging  CT-CTA CHEST PULMONARY ARTERY W/ RECONS   Final Result      No CT evidence of pulmonary thromboembolism      Multichamber cardiac enlargement with pulmonary edema, coronary artery disease, small to medium-sized bilateral pleural effusions      7 mm right middle lobe, 6 mm left lower lobe and a smaller noncalcified pulmonary nodules. Mild mediastinal adenopathy. Follow recommendations as below      Low Risk: CT at 3-6 months, then consider CT at 18-24 months      High Risk: CT at 3-6 months, then at 18-24 months      Comments: Use most suspicious nodule as guide to management. Follow-up intervals may vary according to size and risk.      Low Risk - Minimal or absent history of smoking and of other known risk factors.      High Risk - History of smoking or of other known risk factors.      Note: These recommendations do not apply to lung cancer screening, patients with immunosuppression, or patients with known primary cancer.      Fleischner Society 2017 Guidelines for Management of Incidentally Detected Pulmonary Nodules in Adults           Assessment/Plan  * Acute pulmonary edema (HCC)  Assessment & Plan  -Inpatient status to telemetry unit.  -CT showing pulmonary edema [place] 07[address] on admission.  She has bilateral lower extremity pitting edema.  -She is a dialysis patient, currently goes to dialysis sessions on Monday, Wednesdays and Fridays.  She follows with Dr. Villar and will call nephrology team to arrange nonemergency hemodialysis for her in the morning.  -She had cardiac Cath done in June 2021 for prerenal transplant evaluation showing EF of 70%.  -She takes ACE inhibitor,  lisinopril that I will continue.  -We will continue to do serial cardiac enzymes.  At baseline her troponin is around 60 and on admission 57 ng/L.  -I am adding morphine for chest pain.  -Continue Aspirin and Plavix    Anemia, chronic disease  Assessment & Plan  -On admission hemoglobin is 8.4 which is much lower than her baseline around 11.  There is no history of bleeding and I will closely monitor her CBC in the morning.    Atypical chest pain  Assessment & Plan  -Patient with history of CAD status post RCA PCI.  -She had a cardiac cath done on 6/8/2021 for prerenal transplant evaluation which demonstrated nonobstructive coronary artery disease and fully patent placed RCA stent with EF of 70%.  -She has chronically mildly elevated troponin given underlying chronic kidney disease and her troponin on admission is at baseline.  -She had multiple admissions with chest pain in the past.  -Chest pain likely secondary to fluid overload and pulmonary edema but will monitor her troponin levels overnight and treat her pain with morphine for now.  Her blood pressure is also elevated and I am giving her Nitropaste for this.  -I will monitor her on cardiac unit    Acute diastolic CHF (congestive heart failure) (HCC)- (present on admission)  Assessment & Plan  -Plan as above.    Paroxysmal A-fib (HCC)- (present on admission)  Assessment & Plan  -She takes carvedilol and is anticoagulated with renally dose Eliquis that I will continue.    Pancytopenia (HCC)- (present on admission)  Assessment & Plan  -WBC 2.5, Hgb 8.4 and Platelets 109. Chronic issue and she has followed with Heme in the past. Monitor CBC    Acquired hypothyroidism- (present on admission)  Assessment & Plan  -Continue levothyroxine.    ESRD (end stage renal disease) on dialysis (HCC)- (present on admission)  Assessment & Plan  -Patient on hemodialysis for diabetes and history of left nephrectomy due to underlying renal cancer years ago.  -She has dialysis on  Monday, Wednesdays and Fridays and follows with Dr. Villar.  -Hospitalist to contact nephrology team in the morning to arrange nonemergency dialysis.  -Avoid nephrotoxic medications.  -Patient is awaiting renal transplant.    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    RLS (restless legs syndrome)- (present on admission)  Assessment & Plan  -She takes requisite for this.    Uncontrolled hypertension  Assessment & Plan  -Resume hydralazine, carvedilol, lisinopril and amlodipine  -Should improve with volume reduction with HD today.           VTE prophylaxis: therapeutic anticoagulation with eliquis    I have performed a physical exam and reviewed and updated ROS and Plan today (1/25/2022). In review of yesterday's note (1/24/2022), there are no changes except as documented above.

## 2022-01-25 NOTE — ED NOTES
Med rec updated and complete, per RX bottles and pt  Allergies reviewed, per pt  Used  services spoke with Marsha ([id number])  Pt reports that all her medications gives her a stomach ache and that she has not taken her ELIQUIS 2.5MG since 1/20/2022, pt also does not take PLAVIX 75MG  Called pts daughter (Catie) [phone], she does not speak english

## 2022-01-25 NOTE — ED NOTES
Assumed care of pt. Placed on cardiac moniter (tele ) per admit order,troponin drawn per previous order. Dialysis in progress, pt w/o distress

## 2022-01-25 NOTE — ASSESSMENT & PLAN NOTE
-Patient on hemodialysis for diabetes and history of left nephrectomy due to underlying renal cancer years ago.  -She has dialysis on Monday, Wednesdays and Fridays and follows with Dr. Villar.  -Hospitalist to contact nephrology team in the morning to arrange nonemergency dialysis.  -Avoid nephrotoxic medications.  -Patient is awaiting renal transplant.

## 2022-01-25 NOTE — H&P
Hospital Medicine History & Physical Note    Date of Service  1/25/2022    Primary Care Physician  MALGORZATA Concepcion.    Consultants  None    Code Status  Prior    Chief Complaint  Chief Complaint   Patient presents with   • Chest Pain      x several hrs Denies Cough or fever pt fully vaccinated against COVID Hx CKD   • Shortness of Breath   • Leg Swelling     Mild bilat ankle edema noted this am       History of Presenting Illness  Tere Gallegos is a 71 y.o. female, with h/o ESRD on HD (M/W/F, Dr. Villar) due to h/o Left Kidney Cancer s/p Nephrectomy and DM awaiting for renal transplant. Also has h/o CAD s/p RCA PCI (2018), Afib on Eliquis, HTN and HLD. She  presented to the ER on 1/24/2022 complaining of worsening chest pain, bilateral lower extremity edema and shortness of breath.  No cough or fever and she is fully vaccinated against COVID-19.  Her pain is on the mid chest and is started 3 days ago, but today worse in severity and mostly on the left side of the chest.  She does not have nausea, vomiting, diaphoresis.  Pain is worse with exertion. Patient had HD session earlier in the day and did not miss any sessions.     I discussed the plan of care with patient.    Review of Systems  Review of Systems   Constitutional: Negative for fever.   HENT: Negative for congestion and sore throat.    Eyes: Negative for blurred vision and double vision.   Respiratory: Negative for cough and shortness of breath.    Cardiovascular: Negative for chest pain and palpitations.   Gastrointestinal: Negative for nausea and vomiting.   Genitourinary: Negative for dysuria and urgency.   Musculoskeletal: Negative for myalgias and neck pain.   Skin: Negative for itching and rash.   Neurological: Negative for dizziness, weakness and headaches.   Endo/Heme/Allergies: Does not bruise/bleed easily.   Psychiatric/Behavioral: Negative for depression. The patient does not have insomnia.        Past Medical History   has a  past medical history of Acquired hypothyroidism (5/4/2020), CAD (coronary artery disease), Chronic diastolic heart failure (HCC) (5/4/2020), Coronary artery disease due to lipid rich plaque, Dental disorder, Diabetes ([facility]), ESRD (end stage renal disease) on dialysis (HCC) (5/4/2020), Hemodialysis patient ([facility]), Hyperlipidemia, Hypertension, Kidney transplant candidate, Presence of drug-eluting stent in right coronary artery, QT prolongation (1/22/2020), RLS (restless legs syndrome) (8/5/2016), and Transaminitis (12/22/2018).    Surgical History   has a past surgical history that includes recovery (8/16/2016); other abdominal surgery; other (Left, 2014); zzz cardiac cath (8/16/16); and zzz cardiac cath (9/7/2016).     Family History  family history includes Diabetes in her brother and sister; Other in her sister.   Family history reviewed with patient. There is no family history that is pertinent to the chief complaint.     Social History   reports that she has never smoked. She has never used smokeless tobacco. She reports that she does not drink alcohol and does not use drugs.    Allergies  No Known Allergies    Medications  Prior to Admission Medications   Prescriptions Last Dose Informant Patient Reported? Taking?   ROPINIRole (REQUIP) 1 MG Tab   No No   Sig: TOME FERNANDO TABLETA DOS VECES AL DESMOND   amLODIPine (NORVASC) 10 MG Tab  Rx Bottle (For Med Information) No No   Sig: TOME FERNANDO TABLETA TODOS LOS MCCORMICK   Patient taking differently: Take 10 mg by mouth every day.   apixaban (ELIQUIS) 2.5mg Tab  Rx Bottle (For Med Information) No No   Sig: Take 1 Tablet by mouth 2 times a day. Indications: Thromboembolism secondary to Atrial Fibrillation   carvedilol (COREG) 25 MG Tab  Rx Bottle (For Med Information) No No   Sig: TOME FERNANDO TABLETA DOS VECES AL DESMOND CON LAS COMIDAS   Patient taking differently: Take 25 mg by mouth every day. Per pt only takes once a day, makes her feel sick   clopidogrel (PLAVIX) 75 MG Tab  Rx Bottle  (For Med Information) No No   Sig: DIOGO RENDONS LOS MCCORMICK   Patient taking differently: Take 75 mg by mouth every day.   hydrALAZINE (APRESOLINE) 100 MG tablet  Rx Bottle (For Med Information) No No   Sig: Take 1 tablet by mouth 2 times a day.   Patient taking differently: Take 100 mg by mouth every day. Per pt only takes once a day, makes her feel sick   levothyroxine (SYNTHROID) 50 MCG Tab  Rx Bottle (For Med Information) No No   Sig: Take 1 Tab by mouth Every morning on an empty stomach.   lisinopril (PRINIVIL) 40 MG tablet  Rx Bottle (For Med Information) No No   Sig: Take 1 tablet by mouth every day.   meclizine (ANTIVERT) 25 MG Tab  Patient No No   Sig: Take 1 Tablet by mouth 3 times a day as needed for Vertigo.   ondansetron (ZOFRAN ODT) 4 MG TABLET DISPERSIBLE  Patient No No   Sig: Take 1 Tablet by mouth every 6 hours as needed for Nausea.   pioglitazone (ACTOS) 30 MG Tab  Rx Bottle (For Med Information) No No   Sig: Take 1 Tab by mouth every day.   pregabalin (LYRICA) 25 MG Cap   Yes No   Sig: Take 25 mg by mouth 3 times a day.   sevelamer carbonate (RENVELA) 800 MG Tab tablet  Patient No No   Sig: Take 1 Tablet by mouth 3 times a day with meals.      Facility-Administered Medications: None       Physical Exam  Temp:  [37.3 °C (99.1 °F)] 37.3 °C (99.1 °F)  Pulse:  [73] 73  Resp:  [16] 16  BP: (170-177)/(63) 177/63  SpO2:  [86 %] 86 %  Blood Pressure : (!) 177/63   Temperature: 37.3 °C (99.1 °F)   Pulse: 73   Respiration: 16   Pulse Oximetry: (!) 86 %       Physical Exam  Constitutional:       Appearance: Normal appearance.   HENT:      Head: Normocephalic and atraumatic.      Mouth/Throat:      Mouth: Mucous membranes are moist.      Pharynx: Oropharynx is clear.   Eyes:      Extraocular Movements: Extraocular movements intact.      Pupils: Pupils are equal, round, and reactive to light.   Cardiovascular:      Rate and Rhythm: Normal rate and regular rhythm.      Heart sounds: Normal heart sounds.    Pulmonary:      Effort: Pulmonary effort is normal.      Breath sounds: Rales (Fine bibasilar rales) present. No wheezing or rhonchi.   Abdominal:      General: Abdomen is flat. Bowel sounds are normal.      Palpations: Abdomen is soft.   Musculoskeletal:      Cervical back: Normal range of motion and neck supple.      Comments: +2 bilateral pitting edema on lower extremities   Skin:     General: Skin is warm and dry.   Neurological:      General: No focal deficit present.      Mental Status: She is alert and oriented to person, place, and time.   Psychiatric:         Mood and Affect: Mood normal.         Behavior: Behavior normal.         Laboratory:  Recent Labs     01/24/22  2229   WBC 2.5*   RBC 2.78*   HEMOGLOBIN 8.4*   HEMATOCRIT 26.4*   MCV 95.0   MCH 30.2   MCHC 31.8*   RDW 53.2*   PLATELETCT 109*   MPV 11.0     Recent Labs     01/24/22  2229   SODIUM 137   POTASSIUM 4.4   CHLORIDE 97   CO2 29   GLUCOSE 98   BUN 14   CREATININE 4.69*   CALCIUM 9.1     Recent Labs     01/24/22  2229   ALTSGPT 15   ASTSGOT 19   ALKPHOSPHAT 87   TBILIRUBIN 0.4   GLUCOSE 98     Recent Labs     01/24/22  2229   APTT 25.6   INR 1.09     Recent Labs     01/24/22  2229   NTPROBNP 39743*         Recent Labs     01/24/22  2229   TROPONINT 57*       Imaging:  CT-CTA CHEST PULMONARY ARTERY W/ RECONS   Final Result      No CT evidence of pulmonary thromboembolism      Multichamber cardiac enlargement with pulmonary edema, coronary artery disease, small to medium-sized bilateral pleural effusions      7 mm right middle lobe, 6 mm left lower lobe and a smaller noncalcified pulmonary nodules. Mild mediastinal adenopathy. Follow recommendations as below      Low Risk: CT at 3-6 months, then consider CT at 18-24 months      High Risk: CT at 3-6 months, then at 18-24 months      Comments: Use most suspicious nodule as guide to management. Follow-up intervals may vary according to size and risk.      Low Risk - Minimal or absent history of  smoking and of other known risk factors.      High Risk - History of smoking or of other known risk factors.      Note: These recommendations do not apply to lung cancer screening, patients with immunosuppression, or patients with known primary cancer.      Fleischner Society 2017 Guidelines for Management of Incidentally Detected Pulmonary Nodules in Adults          EKG:  I have personally reviewed the images and compared with prior images. and My impression is: NSR at 70 bpm, Right axis deviation and no acute ST elevation    Assessment/Plan:  I anticipate this patient will require at least two midnights for appropriate medical management, necessitating inpatient admission.    * Acute pulmonary edema (HCC)  Assessment & Plan  -Inpatient status to telemetry unit.  -CT showing pulmonary edema and her [place]BNP [place] 60365 on admission.  She has bilateral lower extremity pitting edema.  -She is a dialysis patient, currently goes to dialysis sessions on Monday, Wednesdays and Fridays.  She follows with Dr. Villar and will call nephrology team to arrange nonemergency hemodialysis for her in the morning.  -She had cardiac Cath done in June 2021 for prerenal transplant evaluation showing EF of 70%.  -She takes ACE inhibitor, lisinopril that I will continue.  -We will continue to do serial cardiac enzymes.  At baseline her troponin is around 60 and on admission 57 ng/L.  -I am adding morphine for chest pain.  -Continue Aspirin and Plavix    Atypical chest pain  Assessment & Plan  -Patient with history of CAD status post RCA PCI.  -She had a cardiac cath done on 6/8/2021 for prerenal transplant evaluation which demonstrated nonobstructive coronary artery disease and fully patent placed RCA stent with EF of 70%.  -She has chronically mildly elevated troponin given underlying chronic kidney disease and her troponin on admission is at baseline.  -She had multiple admissions with chest pain in the past.  -Chest pain likely secondary  to fluid overload and pulmonary edema but will monitor her troponin levels overnight and treat her pain with morphine for now.  Her blood pressure is also elevated and I am giving her Nitropaste for this.  -I will monitor her on cardiac unit    Uncontrolled hypertension  Assessment & Plan  -Most recent blood pressure is 177/63.  Patient takes hydralazine, carvedilol, lisinopril and amlodipine but I will give her Nitropaste at this time as she is coming with pulmonary edema.  We will closely monitor her blood pressure overnight and resume her morning medications later today.    Anemia, chronic disease  Assessment & Plan  -On admission hemoglobin is 8.4 which is much lower than her baseline around 11.  There is no history of bleeding and I will closely monitor her CBC in the morning.    Paroxysmal A-fib ([facility])- (present on admission)  Assessment & Plan  -She takes carvedilol and is anticoagulated with renally dose Eliquis that I will continue.    ESRD (end stage renal disease) on dialysis ([facility])- (present on admission)  Assessment & Plan  -Patient on hemodialysis for diabetes and history of left nephrectomy due to underlying renal cancer years ago.  -She has dialysis on Monday, Wednesdays and Fridays and follows with Dr. Villar.  -Hospitalist to contact nephrology team in the morning to arrange nonemergency dialysis.  -Avoid nephrotoxic medications.  -Patient is awaiting renal transplant.    Acute diastolic CHF (congestive heart failure) (HCC)- (present on admission)  Assessment & Plan  -Plan as above.    Pancytopenia (HCC)- (present on admission)  Assessment & Plan  -WBC 2.5, Hgb 8.4 and Platelets 109. Chronic issue and she has followed with Heme in the past. Monitor CBC    Acquired hypothyroidism- (present on admission)  Assessment & Plan  -Continue levothyroxine.    Controlled type 2 diabetes mellitus with chronic kidney disease on chronic dialysis, without long-term current use of insulin (HCC)- (present on  admission)  Assessment & Plan  -Hold Actos and start regular insulin sliding scale.    RLS (restless legs syndrome)- (present on admission)  Assessment & Plan  -She takes requisite for this.      VTE prophylaxis: therapeutic anticoagulation with Eliquis

## 2022-01-26 LAB
ANION GAP SERPL CALC-SCNC: 10 MMOL/L (ref 7–16)
BUN SERPL-MCNC: 15 MG/DL (ref 8–22)
CALCIUM SERPL-MCNC: 9.3 MG/DL (ref 8.5–10.5)
CHLORIDE SERPL-SCNC: 99 MMOL/L (ref 96–112)
CO2 SERPL-SCNC: 30 MMOL/L (ref 20–33)
CREAT SERPL-MCNC: 4.16 MG/DL (ref 0.5–1.4)
ERYTHROCYTE [DISTWIDTH] IN BLOOD BY AUTOMATED COUNT: 53 FL (ref 35.9–50)
GLUCOSE BLD-MCNC: 126 MG/DL (ref 65–99)
GLUCOSE BLD-MCNC: 201 MG/DL (ref 65–99)
GLUCOSE BLD-MCNC: 72 MG/DL (ref 65–99)
GLUCOSE BLD-MCNC: 84 MG/DL (ref 65–99)
GLUCOSE SERPL-MCNC: 73 MG/DL (ref 65–99)
HCT VFR BLD AUTO: 24.8 % (ref 37–47)
HGB BLD-MCNC: 7.8 G/DL (ref 12–16)
MCH RBC QN AUTO: 30 PG (ref 27–33)
MCHC RBC AUTO-ENTMCNC: 31.5 G/DL (ref 33.6–35)
MCV RBC AUTO: 95.4 FL (ref 81.4–97.8)
PLATELET # BLD AUTO: 129 K/UL (ref 164–446)
PMV BLD AUTO: 11.3 FL (ref 9–12.9)
POTASSIUM SERPL-SCNC: 4.6 MMOL/L (ref 3.6–5.5)
RBC # BLD AUTO: 2.6 M/UL (ref 4.2–5.4)
SODIUM SERPL-SCNC: 139 MMOL/L (ref 135–145)
WBC # BLD AUTO: 2.1 K/UL (ref 4.8–10.8)

## 2022-01-26 PROCEDURE — 700111 HCHG RX REV CODE 636 W/ 250 OVERRIDE (IP): Performed by: INTERNAL MEDICINE

## 2022-01-26 PROCEDURE — 700102 HCHG RX REV CODE 250 W/ 637 OVERRIDE(OP)

## 2022-01-26 PROCEDURE — 80048 BASIC METABOLIC PNL TOTAL CA: CPT

## 2022-01-26 PROCEDURE — 99233 SBSQ HOSP IP/OBS HIGH 50: CPT | Performed by: INTERNAL MEDICINE

## 2022-01-26 PROCEDURE — A9270 NON-COVERED ITEM OR SERVICE: HCPCS

## 2022-01-26 PROCEDURE — 700102 HCHG RX REV CODE 250 W/ 637 OVERRIDE(OP): Performed by: HOSPITALIST

## 2022-01-26 PROCEDURE — 90935 HEMODIALYSIS ONE EVALUATION: CPT

## 2022-01-26 PROCEDURE — A9270 NON-COVERED ITEM OR SERVICE: HCPCS | Performed by: HOSPITALIST

## 2022-01-26 PROCEDURE — 85027 COMPLETE CBC AUTOMATED: CPT

## 2022-01-26 PROCEDURE — 99232 SBSQ HOSP IP/OBS MODERATE 35: CPT | Performed by: INTERNAL MEDICINE

## 2022-01-26 PROCEDURE — 82962 GLUCOSE BLOOD TEST: CPT

## 2022-01-26 PROCEDURE — 770020 HCHG ROOM/CARE - TELE (206)

## 2022-01-26 RX ADMIN — HYDRALAZINE HYDROCHLORIDE 100 MG: 25 TABLET, FILM COATED ORAL at 05:45

## 2022-01-26 RX ADMIN — SEVELAMER CARBONATE 800 MG: 800 TABLET, FILM COATED ORAL at 16:58

## 2022-01-26 RX ADMIN — ROPINIROLE HYDROCHLORIDE 1 MG: 0.5 TABLET, FILM COATED ORAL at 21:14

## 2022-01-26 RX ADMIN — ASPIRIN 81 MG CHEWABLE TABLET 81 MG: 81 TABLET CHEWABLE at 05:45

## 2022-01-26 RX ADMIN — SENNOSIDES AND DOCUSATE SODIUM 2 TABLET: 50; 8.6 TABLET ORAL at 17:20

## 2022-01-26 RX ADMIN — APIXABAN 5 MG: 5 TABLET, FILM COATED ORAL at 05:45

## 2022-01-26 RX ADMIN — SEVELAMER CARBONATE 800 MG: 800 TABLET, FILM COATED ORAL at 11:27

## 2022-01-26 RX ADMIN — APIXABAN 5 MG: 5 TABLET, FILM COATED ORAL at 17:20

## 2022-01-26 RX ADMIN — INSULIN HUMAN 4 UNITS: 100 INJECTION, SOLUTION PARENTERAL at 11:27

## 2022-01-26 RX ADMIN — LISINOPRIL 40 MG: 20 TABLET ORAL at 05:46

## 2022-01-26 RX ADMIN — CARVEDILOL 25 MG: 25 TABLET, FILM COATED ORAL at 05:45

## 2022-01-26 RX ADMIN — EPOETIN ALFA-EPBX 10000 UNITS: 10000 INJECTION, SOLUTION INTRAVENOUS; SUBCUTANEOUS at 17:30

## 2022-01-26 RX ADMIN — LEVOTHYROXINE SODIUM 50 MCG: 0.05 TABLET ORAL at 05:45

## 2022-01-26 RX ADMIN — AMLODIPINE BESYLATE 10 MG: 5 TABLET ORAL at 05:45

## 2022-01-26 RX ADMIN — SEVELAMER CARBONATE 800 MG: 800 TABLET, FILM COATED ORAL at 07:59

## 2022-01-26 RX ADMIN — PREGABALIN 25 MG: 25 CAPSULE ORAL at 05:45

## 2022-01-26 RX ADMIN — SENNOSIDES AND DOCUSATE SODIUM 2 TABLET: 50; 8.6 TABLET ORAL at 05:45

## 2022-01-26 ASSESSMENT — ENCOUNTER SYMPTOMS
PALPITATIONS: 0
SHORTNESS OF BREATH: 0
ORTHOPNEA: 0
COUGH: 1
VOMITING: 0
WEIGHT LOSS: 0
CHILLS: 0
SINUS PAIN: 0
EYES NEGATIVE: 1
FEVER: 0
HEMOPTYSIS: 0
WHEEZING: 0
NAUSEA: 0

## 2022-01-26 ASSESSMENT — FIBROSIS 4 INDEX: FIB4 SCORE: 2.74

## 2022-01-26 ASSESSMENT — PAIN DESCRIPTION - PAIN TYPE: TYPE: ACUTE PAIN

## 2022-01-26 NOTE — CONSULTS
DATE OF SERVICE:  01/25/2022     NEPHROLOGY CONSULTATION     REQUESTING PHYSICIAN:  Aster Kahn DO     REASON FOR THE CONSULT:  To evaluate and provide dialysis for the patient with   end-stage renal disease.     HISTORY OF PRESENT ILLNESS:  The patient is a 71-year-old female with   end-stage renal disease, on hemodialysis, who has been receiving dialysis   treatments Monday, Wednesday, and Friday in [facility]   [facility].  She is under care of nephrologist associate of mine, Dr. Priyank Villar,   presented to emergency room last night with complaints of worsening chest   pain, shortness of breath and edema.  Denies any cough, fever or hemoptysis.    Currently undergoing dialysis, tolerates well.     REVIEW OF SYSTEMS:  GENERAL:  No fever or chills.  Positive for fatigue.  HEENT:  No sore throat, congestion, or nosebleeds.  EYES:  No double or blurry vision.  RESPIRATORY:  No cough.  Positive for shortness of breath.  CARDIOVASCULAR:  No chest pain, no palpitations.  GASTROINTESTINAL:  No nausea or vomiting.  GENITOURINARY:  No dysuria, hematuria or flank pain.     All other systems reviewed, all negative.     PAST MEDICAL HISTORY:  End-stage renal disease, on hemodialysis, chronic   diastolic heart failure, coronary artery disease, restless leg syndrome,   kidney cancer.     PAST SURGICAL HISTORY:  Abdominal surgery, cardiac catheterization, and a   tunneled dialysis catheter placement.     FAMILY HISTORY:  Positive for diabetes mellitus in multiple family members.     SOCIAL HISTORY:  No tobacco, alcohol or drug use.     ALLERGIES:  No known drug allergies.     OUTPATIENT MEDICATIONS:  Reviewed.     PHYSICAL EXAMINATION:  VITAL SIGNS:  Blood pressure 170/60, pulse 73, temperature 37.3 Celsius.  GENERAL APPEARANCE:  Well-developed, well-nourished female in no acute   distress.  HEENT:  Normocephalic, atraumatic.  Pupils equal, round, reactive to light.    Extraocular movement intact.  Nares patent.   Oropharynx clear, moist mucosa,   no erythema or exudate.  NECK:  Supple.  No lymphadenopathy, no thyromegaly appreciated.  A tunneled   dialysis catheter in place.  LUNGS:  Coarse breath sounds bilaterally.  Few bibasilar crackles.  HEART:  Regular rhythm, no rub or gallop.  ABDOMEN:  Soft, nontender, nondistended.  Bowel sounds present.  No palpable   mass.  SKIN:  Warm, dry.  No erythema or rash.  EXTREMITIES:  1+ edema, both lower extremities.  No cyanosis.     LABORATORY RESULTS:  Reviewed, revealed hemoglobin level 8.4.  Sodium 137,   potassium 4.4, BUN 14 and creatinine level 4.69.     ASSESSMENT AND PLAN:  The patient is a 71-year-old female with multiple   medical problems, end-stage renal disease, on hemodialysis, admitted with   hypervolemia.  1.  End-stage renal disease.  We will dialyze the patient today.  Continue   Monday, Wednesday and Friday.  2.  Hypertension.  Elevated blood pressure should improve with volume   correction.  3.  Anemia.  Hemoglobin level below the goal.  Add Epogen with dialysis.  4.  Volume hypervolemic.  ____ ultrafiltration with hemodialysis if blood   pressure tolerates.     RECOMMENDATIONS:  Hemodialysis Monday, Wednesday and Friday.  To monitor   hemoglobin level, basic metabolic panel.  Provide renal diet.  Adjust all   medications to renal doses.        ______________________________  MD ROSSY SAWYER/ETELVINA/LINA    DD:  01/25/2022 16:15  DT:  01/25/2022 17:37    Job#:  [id number]

## 2022-01-26 NOTE — DISCHARGE PLANNING
Anticipated Discharge Disposition: Home    Action: Discussed pt in rounds. Per MD, pt is clear to d/c today. Pt has 6-clicks of 24. Per chart review, pt lives with adult children. No SW or d/c needs reported or identified.     Barriers to Discharge: None    Plan: LSW to follow and assist as needed.    Care Transition Team Assessment    Information Source  Orientation Level: Oriented X4  Information Given By:  (chart review)  Who is responsible for making decisions for patient? : Patient    Readmission Evaluation  Is this a readmission?: No    Elopement Risk  Legal Hold: No    Interdisciplinary Discharge Planning  Primary Care Physician: Kathy Melgar   Lives with - Patient's Self Care Capacity: Adult Children  Patient or legal guardian wants to designate a caregiver: No  Support Systems: Children,Family Member(s)  Housing / Facility: 2 Story Apartment / Condo  Patient Prefers to be Discharged to:: home  Durable Medical Equipment: Not Applicable    Discharge Preparedness  What is your plan after discharge?: Home with help  What are your discharge supports?: Child  Prior Functional Level: Ambulatory  Difficulity with ADLs: None  Difficulity with IADLs: None    Functional Assesment  Prior Functional Level: Ambulatory    Finances  Financial Barriers to Discharge: No    Vision / Hearing Impairment  Vision Impairment : Yes  Right Eye Vision: Impaired,Wears Glasses  Left Eye Vision: Impaired,Wears Glasses  Hearing Impairment : No         Advance Directive  Advance Directive?: None    Domestic Abuse  Have you ever been the victim of abuse or violence?: No  Physical Abuse or Sexual Abuse: No  Verbal Abuse or Emotional Abuse: No  Possible Abuse/Neglect Reported to:: Not Applicable    Psychological Assessment  History of Substance Abuse: None    Discharge Risks or Barriers  Discharge risks or barriers?: No  Patient risk factors: Language barrier    Anticipated Discharge Information  Discharge Disposition: Discharged to  home/self care (01)

## 2022-01-26 NOTE — DISCHARGE SUMMARY
Discharge Summary    CHIEF COMPLAINT ON ADMISSION  Chief Complaint   Patient presents with   • Chest Pain      x several hrs Denies Cough or fever pt fully vaccinated against COVID Hx CKD   • Shortness of Breath   • Leg Swelling     Mild bilat ankle edema noted this am       Reason for Admission  Chest Pain     Admission Date  1/24/2022    CODE STATUS  Full Code    HPI & HOSPITAL COURSE  Tere Gallegos is a 71 y.o. female, with h/o ESRD on HD (M/W/F, Dr. Villar) due to h/o Left Kidney Cancer s/p Nephrectomy and DM awaiting for renal transplant. Also has h/o CAD s/p RCA PCI (2018), Afib on Eliquis, HTN and HLD. She  presented to the ER on 1/24/2022 complaining of worsening chest pain, bilateral lower extremity edema and shortness of breath.  No cough or fever and she is fully vaccinated against COVID-19.  Her pain is on the mid chest and is started 3 days ago, but today worse in severity and mostly on the left side of the chest.  Patient had HD session earlier in the day and did not miss any sessions.  Imaging was consistent with pulmonary edema and nephrology consulted for additional dialysis session to help remove extra fluid as she is anuric.  She had 3 L fluid removed on HD on 1/25 and is having her regular HD session today.  She has been weaned from oxygen and no longer has any complaints of chest pain or difficulty breathing.  She is appropriate for discharge home.      Therefore, she is discharged in good and stable condition to home with close outpatient follow-up.    The patient met 2-midnight criteria for an inpatient stay at the time of discharge.    Discharge Date  1/27/2022    FOLLOW UP ITEMS POST DISCHARGE  PCP - 2 weeks    DISCHARGE DIAGNOSES  Principal Problem:    Acute pulmonary edema (HCC) POA: Unknown  Active Problems:    Uncontrolled hypertension POA: Unknown    RLS (restless legs syndrome) POA: Yes    Controlled type 2 diabetes mellitus with chronic kidney disease on chronic dialysis,  without long-term current use of insulin (HCC) POA: Yes    ESRD (end stage renal disease) on dialysis (HCC) POA: Yes    Acquired hypothyroidism POA: Yes    Pancytopenia (HCC) POA: Yes    Paroxysmal A-fib (HCC) POA: Yes    Acute diastolic CHF (congestive heart failure) ([facility]) POA: Yes    Atypical chest pain POA: Unknown    Anemia, chronic disease POA: Unknown  Resolved Problems:    * No resolved hospital problems. *      FOLLOW UP  Future Appointments   Date Time Provider Department Center   2/16/2022  3:00 PM ANGELITA Concepcion [facility] JEET Vinson   3/29/2022 11:45 AM [facility] EXAM 4 [facility] None     ANGELITA Concepcion  [address]  [address]  [place] [address]  [phone]    In 2 weeks        MEDICATIONS ON DISCHARGE     Medication List      CHANGE how you take these medications      Instructions   amLODIPine 10 MG Tabs  What changed: when to take this  Commonly known as: NORVASC   TOME FERNANDO TABLETA TODOS LOS MCCORMICK     carvedilol 25 MG Tabs  What changed: See the new instructions.  Commonly known as: COREG   TOME FERNANDO TABLETA DOS VECES AL DESMOND CON LAS COMIDAS     hydrALAZINE 100 MG tablet  What changed:   · when to take this  · additional instructions  Commonly known as: APRESOLINE   Take 1 tablet by mouth 2 times a day.  Dose: 100 mg     ROPINIRole 1 MG Tabs  What changed:   · when to take this  · additional instructions  Commonly known as: REQUIP   TOME FERNANDO TABLETA DOS VECES AL DESMOND     sevelamer carbonate 800 MG Tabs tablet  What changed:   · how much to take  · additional instructions  Commonly known as: RENVELA   Take 1 Tablet by mouth 3 times a day with meals.  Dose: 800 mg        CONTINUE taking these medications      Instructions   apixaban 2.5mg Tabs  Commonly known as: ELIQUIS   Take 1 Tablet by mouth 2 times a day. Indications: Thromboembolism secondary to Atrial Fibrillation  Dose: 2.5 mg     levothyroxine 50 MCG Tabs  Commonly known as: SYNTHROID   Take 1 Tab by mouth Every morning on an empty  stomach.  Dose: 50 mcg     lisinopril 40 MG tablet  Commonly known as: PRINIVIL   Take 1 tablet by mouth every day.  Dose: 40 mg     pioglitazone 30 MG Tabs  Commonly known as: ACTOS   Take 1 Tab by mouth every day.  Dose: 30 mg     pregabalin 25 MG Caps  Commonly known as: LYRICA   Take 25 mg by mouth 2 times a day. Pt reports that it makes her dizzy and drunk, pt sometimes takes this medication  Dose: 25 mg            Allergies  No Known Allergies    DIET  Orders Placed This Encounter   Procedures   • Diet Order Diet: Cardiac     Standing Status:   Standing     Number of Occurrences:   1     Order Specific Question:   Diet:     Answer:   Cardiac [6]       ACTIVITY  As tolerated.  Weight bearing as tolerated    CONSULTATIONS  [place] - nephrology    PROCEDURES  none    LABORATORY  Lab Results   Component Value Date    SODIUM 139 01/26/2022    POTASSIUM 4.6 01/26/2022    CHLORIDE 99 01/26/2022    CO2 30 01/26/2022    GLUCOSE 73 01/26/2022    BUN 15 01/26/2022    CREATININE 4.16 (H) 01/26/2022        Lab Results   Component Value Date    WBC 2.1 (LL) 01/26/2022    HEMOGLOBIN 7.8 (L) 01/26/2022    HEMATOCRIT 24.8 (L) 01/26/2022    PLATELETCT 129 (L) 01/26/2022        Total time of the discharge process exceeds 35 minutes.

## 2022-01-26 NOTE — PROGRESS NOTES
COVID-19 surge in effect, tier three crisis charting    Pt arrived to unit via gurney. Ambulated from gurney to bed, x standby assist. Tele monitor applied, vitals taken. Pt assessed. A&O x4. Discussed POC with pt, including fall risk/precautions, home medications to pharmacy, supplemental oxygen. Communication board filled out. Questions and concerns addressed, verbalized understanding. Fall precautions in place. Pt demonstrates ability to use call light appropriately. Pt left in lowest position. Bed locked and low.

## 2022-01-26 NOTE — PROGRESS NOTES
COVID-19 Surge in effect, Tier 3.     Received report from dayshift RN. Patient in bed locked in lowest position with call light in reach. POC discussed. All questions answered.

## 2022-01-26 NOTE — PROGRESS NOTES
Komal from Lab called with critical result of WBC 2.1 at 0429. Critical lab result read back to Komal.   Dr. Phillips notified of critical lab result at 0443.  Critical lab result read back by Dr. Phillips.  No orders received. Will continue to monitor.

## 2022-01-26 NOTE — PROGRESS NOTES
Nephrology Daily Progress Note    Date of Service  1/26/2022    Chief Complaint  71 y.o. female with ESRD/HD, admitted 1/24/2022 with cough, SOB, CP    Interval Problem Update  1.26 -doing much better  Plan d/c home after dialysis    Review of Systems  Review of Systems   Constitutional: Negative for chills, fever, malaise/fatigue and weight loss.   HENT: Negative for congestion, hearing loss and sinus pain.    Eyes: Negative.    Respiratory: Positive for cough. Negative for hemoptysis, shortness of breath and wheezing.    Cardiovascular: Negative for chest pain, palpitations, orthopnea and leg swelling.   Gastrointestinal: Negative for nausea and vomiting.   Skin: Negative.    All other systems reviewed and are negative.       Physical Exam  Temp:  [36.5 °C (97.7 °F)-37.3 °C (99.2 °F)] 36.6 °C (97.9 °F)  Pulse:  [60-69] 61  Resp:  [14-18] 18  BP: (124-170)/(40-67) 139/48  SpO2:  [85 %-97 %] 90 %    Physical Exam  Vitals and nursing note reviewed.   Constitutional:       General: She is not in acute distress.     Appearance: Normal appearance. She is well-developed. She is not diaphoretic.   HENT:      Head: Normocephalic and atraumatic.      Nose: Nose normal.      Mouth/Throat:      Mouth: Mucous membranes are moist.      Pharynx: Oropharynx is clear.   Eyes:      Extraocular Movements: Extraocular movements intact.      Conjunctiva/sclera: Conjunctivae normal.      Pupils: Pupils are equal, round, and reactive to light.   Cardiovascular:      Rate and Rhythm: Normal rate and regular rhythm.      Pulses: Normal pulses.      Heart sounds: Normal heart sounds. No friction rub. No gallop.    Pulmonary:      Effort: Pulmonary effort is normal. No respiratory distress.      Breath sounds: Normal breath sounds. No wheezing or rales.   Abdominal:      General: Bowel sounds are normal. There is no distension.      Palpations: Abdomen is soft. There is no mass.      Tenderness: There is no abdominal tenderness.    Musculoskeletal:      Cervical back: Normal range of motion and neck supple.      Right lower leg: No edema.      Left lower leg: No edema.   Skin:     General: Skin is warm.      Findings: No erythema or rash.   Neurological:      General: No focal deficit present.      Mental Status: She is alert and oriented to person, place, and time.      Cranial Nerves: No cranial nerve deficit.      Coordination: Coordination normal.   Psychiatric:         Mood and Affect: Mood normal.         Behavior: Behavior normal.         Thought Content: Thought content normal.         Judgment: Judgment normal.         Fluids    Intake/Output Summary (Last 24 hours) at 1/26/2022 1521  Last data filed at 1/25/2022 1537  Gross per 24 hour   Intake 500 ml   Output 3500 ml   Net -3000 ml       Laboratory  Recent Labs     01/24/22 2229 01/26/22 0221   WBC 2.5* 2.1*   RBC 2.78* 2.60*   HEMOGLOBIN 8.4* 7.8*   HEMATOCRIT 26.4* 24.8*   MCV 95.0 95.4   MCH 30.2 30.0   MCHC 31.8* 31.5*   RDW 53.2* 53.0*   PLATELETCT 109* 129*   MPV 11.0 11.3     Recent Labs     01/24/22 2229 01/26/22 0221   SODIUM 137 139   POTASSIUM 4.4 4.6   CHLORIDE 97 99   CO2 29 30   GLUCOSE 98 73   BUN 14 15   CREATININE 4.69* 4.16*   CALCIUM 9.1 9.3     Recent Labs     01/24/22 2229   APTT 25.6   INR 1.09     Recent Labs     01/24/22 2229   NTPROBNP 68234*           Imaging  reviewed    Assessment/Plan  1.ESRD/HD -on MWF schedule  2.HTN: BP well controlled  3.Electrolytes: well controlled.  4.Anemia: drop in Hb level to monitor-Epogen with HD  5.Volume:UF with HD as BP tolerates    Recs; HD MWF             Renal diet             All meds to renal doses             Will follow

## 2022-01-26 NOTE — PROGRESS NOTES
Tele strip printed at 1359     Rhythm: SB   HR: 57  Measurements: 0.18/0.08/0.46        Telemetry Shift Summary     Rhythm: SR/SB  HR Range: 50's-70's  Ectopy: Rare PAC     Telemetry monitoring strips placed in pt's chart.

## 2022-01-27 VITALS
SYSTOLIC BLOOD PRESSURE: 125 MMHG | TEMPERATURE: 97.7 F | WEIGHT: 135.8 LBS | HEIGHT: 60 IN | RESPIRATION RATE: 20 BRPM | DIASTOLIC BLOOD PRESSURE: 43 MMHG | HEART RATE: 60 BPM | BODY MASS INDEX: 26.66 KG/M2 | OXYGEN SATURATION: 92 %

## 2022-01-27 LAB — GLUCOSE BLD-MCNC: 88 MG/DL (ref 65–99)

## 2022-01-27 PROCEDURE — 99239 HOSP IP/OBS DSCHRG MGMT >30: CPT | Performed by: INTERNAL MEDICINE

## 2022-01-27 PROCEDURE — 82962 GLUCOSE BLOOD TEST: CPT

## 2022-01-27 PROCEDURE — A9270 NON-COVERED ITEM OR SERVICE: HCPCS

## 2022-01-27 PROCEDURE — 700102 HCHG RX REV CODE 250 W/ 637 OVERRIDE(OP): Performed by: HOSPITALIST

## 2022-01-27 PROCEDURE — A9270 NON-COVERED ITEM OR SERVICE: HCPCS | Performed by: HOSPITALIST

## 2022-01-27 PROCEDURE — 700102 HCHG RX REV CODE 250 W/ 637 OVERRIDE(OP)

## 2022-01-27 RX ADMIN — SEVELAMER CARBONATE 800 MG: 800 TABLET, FILM COATED ORAL at 08:00

## 2022-01-27 RX ADMIN — LEVOTHYROXINE SODIUM 50 MCG: 0.05 TABLET ORAL at 05:45

## 2022-01-27 RX ADMIN — PREGABALIN 25 MG: 25 CAPSULE ORAL at 05:45

## 2022-01-27 RX ADMIN — ASPIRIN 81 MG CHEWABLE TABLET 81 MG: 81 TABLET CHEWABLE at 05:44

## 2022-01-27 RX ADMIN — AMLODIPINE BESYLATE 10 MG: 5 TABLET ORAL at 05:45

## 2022-01-27 RX ADMIN — LISINOPRIL 40 MG: 20 TABLET ORAL at 05:44

## 2022-01-27 RX ADMIN — APIXABAN 5 MG: 5 TABLET, FILM COATED ORAL at 05:45

## 2022-01-27 RX ADMIN — HYDRALAZINE HYDROCHLORIDE 100 MG: 25 TABLET, FILM COATED ORAL at 05:44

## 2022-01-27 RX ADMIN — SENNOSIDES AND DOCUSATE SODIUM 2 TABLET: 50; 8.6 TABLET ORAL at 05:45

## 2022-01-27 RX ADMIN — CARVEDILOL 25 MG: 25 TABLET, FILM COATED ORAL at 05:45

## 2022-01-27 ASSESSMENT — ENCOUNTER SYMPTOMS
COUGH: 0
MYALGIAS: 0
CHILLS: 0
SHORTNESS OF BREATH: 0
WEAKNESS: 0
SORE THROAT: 0
HEADACHES: 0
BLURRED VISION: 0
VOMITING: 0
NERVOUS/ANXIOUS: 0
HEARTBURN: 0
DIARRHEA: 0
ABDOMINAL PAIN: 0
NAUSEA: 0
PHOTOPHOBIA: 0
FEVER: 0
SENSORY CHANGE: 0
DEPRESSION: 0
INSOMNIA: 0
SPEECH CHANGE: 0
DIZZINESS: 0
CONSTIPATION: 0
CLAUDICATION: 0

## 2022-01-27 ASSESSMENT — PAIN DESCRIPTION - PAIN TYPE: TYPE: ACUTE PAIN

## 2022-01-27 ASSESSMENT — FIBROSIS 4 INDEX: FIB4 SCORE: 2.74

## 2022-01-27 NOTE — PROGRESS NOTES
"Report received from TRAVIS Trinidad. Patient getting dialysis with dialysis RN at bedside during the time of report. Vital signs are stable, safety precautions in place, and call light within reach.     2031 Dr. Mederos notifed patient is done with dialysis with removal of 3.5 L and asked if he would like to D/C patient per nephrology note. Dr. Mederos said, \"will review but likely tomorrow morning\".     2045 iPad  used to inform patient of staying overnight with D/C in the morning.       Covid-19 surge in effect, tier 3 charting  "

## 2022-01-27 NOTE — PROGRESS NOTES
Hospital Medicine Daily Progress Note    Date of Service  Late entry for DOS 1/26/2022    Chief Complaint  Tere Gallegos is a 71 y.o. female admitted 1/24/2022 with increased shortness of breath and chest pain.    Hospital Course Tere Gallegos is a 71 y.o. female, with h/o ESRD on HD (M/W/F, Dr. Villar) due to h/o Left Kidney Cancer s/p Nephrectomy and DM awaiting for renal transplant. Also has h/o CAD s/p RCA PCI (2018), Afib on Eliquis, HTN and HLD. She  presented to the ER on 1/24/2022 complaining of worsening chest pain, bilateral lower extremity edema and shortness of breath.  No cough or fever and she is fully vaccinated against COVID-19.  Her pain is on the mid chest and is started 3 days ago, but today worse in severity and mostly on the left side of the chest.  She does not have nausea, vomiting, diaphoresis.  Pain is worse with exertion. Patient had HD session earlier in the day and did not miss any sessions    Interval Problem Update  1/25  Patient with chest discomfort yesterday and shortness of breath for the past 3 days.  She has ESRD and HD MWF without missing sessions but she is completely anuric.  Imaging is showing pulmonary edema and BNP 32k consistent with volume overload.  I reached out to Dr Fernando, on call for Dr Villar and HD being arranged today and again tomorrow.   1/26 Patient is improving, she will have her regular scheduled HD today and had planned on DC after this but it was completed late at night so she stayed overnight and will discharge early in the am.    I have personally seen and examined the patient at bedside. I discussed the plan of care with patient, family, bedside RN, charge RN and .    Consultants/Specialty  nephrology    Code Status  Prior    Disposition  Patient is not medically cleared for discharge.   Anticipate discharge to to home with close outpatient follow-up.  I have placed the appropriate orders for post-discharge  needs.    Review of Systems  Review of Systems   Constitutional: Negative for chills and fever.   HENT: Negative for congestion and sore throat.    Eyes: Negative for blurred vision and photophobia.   Respiratory: Negative for cough and shortness of breath.    Cardiovascular: Negative for chest pain, claudication and leg swelling.   Gastrointestinal: Negative for abdominal pain, constipation, diarrhea, heartburn, nausea and vomiting.   Genitourinary: Negative for dysuria and hematuria.   Musculoskeletal: Negative for joint pain and myalgias.   Skin: Negative for itching and rash.   Neurological: Negative for dizziness, sensory change, speech change, weakness and headaches.   Psychiatric/Behavioral: Negative for depression. The patient is not nervous/anxious and does not have insomnia.         Physical Exam  Temp:  [36.5 °C (97.7 °F)-36.7 °C (98 °F)] 36.5 °C (97.7 °F)  Pulse:  [60-71] 60  Resp:  [18-20] 20  BP: (110-151)/(42-57) 125/43  SpO2:  [90 %-92 %] 92 %    Physical Exam  Vitals and nursing note reviewed.   Constitutional:       General: She is not in acute distress.     Appearance: Normal appearance. She is not ill-appearing.   HENT:      Head: Normocephalic and atraumatic.      Nose: Nose normal.   Cardiovascular:      Rate and Rhythm: Normal rate and regular rhythm.      Heart sounds: Normal heart sounds. No murmur heard.      Pulmonary:      Effort: Pulmonary effort is normal.      Breath sounds: Normal breath sounds.      Comments:     Abdominal:      General: Bowel sounds are normal. There is no distension.      Palpations: Abdomen is soft.   Musculoskeletal:         General: No swelling or tenderness.      Cervical back: Neck supple.      Right lower leg: No edema.      Left lower leg: No edema.   Skin:     General: Skin is warm and dry.   Neurological:      General: No focal deficit present.      Mental Status: She is alert and oriented to person, place, and time.   Psychiatric:         Mood and Affect:  Mood normal.         Fluids    Intake/Output Summary (Last 24 hours) at 1/27/2022 1352  Last data filed at 1/26/2022 2000  Gross per 24 hour   Intake 620 ml   Output 4000 ml   Net -3380 ml       Laboratory  Recent Labs     01/24/22 2229 01/26/22 0221   WBC 2.5* 2.1*   RBC 2.78* 2.60*   HEMOGLOBIN 8.4* 7.8*   HEMATOCRIT 26.4* 24.8*   MCV 95.0 95.4   MCH 30.2 30.0   MCHC 31.8* 31.5*   RDW 53.2* 53.0*   PLATELETCT 109* 129*   MPV 11.0 11.3     Recent Labs     01/24/22 2229 01/26/22 0221   SODIUM 137 139   POTASSIUM 4.4 4.6   CHLORIDE 97 99   CO2 29 30   GLUCOSE 98 73   BUN 14 15   CREATININE 4.69* 4.16*   CALCIUM 9.1 9.3     Recent Labs     01/24/22 2229   APTT 25.6   INR 1.09               Imaging  CT-CTA CHEST PULMONARY ARTERY W/ RECONS   Final Result      No CT evidence of pulmonary thromboembolism      Multichamber cardiac enlargement with pulmonary edema, coronary artery disease, small to medium-sized bilateral pleural effusions      7 mm right middle lobe, 6 mm left lower lobe and a smaller noncalcified pulmonary nodules. Mild mediastinal adenopathy. Follow recommendations as below      Low Risk: CT at 3-6 months, then consider CT at 18-24 months      High Risk: CT at 3-6 months, then at 18-24 months      Comments: Use most suspicious nodule as guide to management. Follow-up intervals may vary according to size and risk.      Low Risk - Minimal or absent history of smoking and of other known risk factors.      High Risk - History of smoking or of other known risk factors.      Note: These recommendations do not apply to lung cancer screening, patients with immunosuppression, or patients with known primary cancer.      Fleischner Society 2017 Guidelines for Management of Incidentally Detected Pulmonary Nodules in Adults           Assessment/Plan  * Acute pulmonary edema (HCC)  Assessment & Plan  -Inpatient status to telemetry unit.  -CT showing pulmonary edema [place] [address] on admission.  She has  bilateral lower extremity pitting edema.  -She is a dialysis patient, currently goes to dialysis sessions on Monday, Wednesdays and Fridays.  She follows with Dr. Villar and will call nephrology team to arrange nonemergency hemodialysis for her in the morning.  -She had cardiac Cath done in June 2021 for prerenal transplant evaluation showing EF of 70%.  -She takes ACE inhibitor, lisinopril that I will continue.  -We will continue to do serial cardiac enzymes.  At baseline her troponin is around 60 and on admission 57 ng/L.  -I am adding morphine for chest pain.  -Continue Aspirin and Plavix    Anemia, chronic disease  Assessment & Plan  -On admission hemoglobin is 8.4 which is much lower than her baseline around 11.  There is no history of bleeding and I will closely monitor her CBC in the morning.    Atypical chest pain  Assessment & Plan  -Patient with history of CAD status post RCA PCI.  -She had a cardiac cath done on 6/8/2021 for prerenal transplant evaluation which demonstrated nonobstructive coronary artery disease and fully patent placed RCA stent with EF of 70%.  -She has chronically mildly elevated troponin given underlying chronic kidney disease and her troponin on admission is at baseline.  -She had multiple admissions with chest pain in the past.  -Chest pain likely secondary to fluid overload and pulmonary edema but will monitor her troponin levels overnight and treat her pain with morphine for now.  Her blood pressure is also elevated and I am giving her Nitropaste for this.  -I will monitor her on cardiac unit    Acute diastolic CHF (congestive heart failure) (HCC)- (present on admission)  Assessment & Plan  -Plan as above.    Paroxysmal A-fib (HCC)- (present on admission)  Assessment & Plan  -She takes carvedilol and is anticoagulated with renally dose Eliquis that I will continue.    Pancytopenia (HCC)- (present on admission)  Assessment & Plan  -WBC 2.5, Hgb 8.4 and Platelets 109. Chronic issue  and she has followed with Heme in the past. Monitor CBC    Acquired hypothyroidism- (present on admission)  Assessment & Plan  -Continue levothyroxine.    ESRD (end stage renal disease) on dialysis (HCC)- (present on admission)  Assessment & Plan  -Patient on hemodialysis for diabetes and history of left nephrectomy due to underlying renal cancer years ago.  -She has dialysis on Monday, Wednesdays and Fridays and follows with Dr. Villar.  -Hospitalist to contact nephrology team in the morning to arrange nonemergency dialysis.  -Avoid nephrotoxic medications.  -Patient is awaiting renal transplant.    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    RLS (restless legs syndrome)- (present on admission)  Assessment & Plan  -She takes requisite for this.    Uncontrolled hypertension  Assessment & Plan  -Resume hydralazine, carvedilol, lisinopril and amlodipine  -Should improve with volume reduction with HD today.         VTE prophylaxis: therapeutic anticoagulation with eliquis    I have performed a physical exam and reviewed and updated ROS and Plan today (1/27/2022). In review of yesterday's note (1/26/2022), there are no changes except as documented above.

## 2022-01-27 NOTE — PROGRESS NOTES
Telemetry Shift Summary     Rhythm: SR/SB  Rate: 58-68  Measurements: .20/.10/.46  Ectopy (reported by Monitor Tech): no ectopy  *SVT up to 189     Normal Values  Rhythm: Sinus  HR:   Measurements: 0.12-0.20/0.06-0.10/0.30-0.52

## 2022-01-27 NOTE — PROGRESS NOTES
Hemodialysis done today, started @ 1710 and ended @ 2011 with net UF= 3500ml. Report given to TRAVIS Vergara. See flow sheet for details.

## 2022-01-27 NOTE — PROGRESS NOTES
0700:Report received from Amrita ROBLES . Pt sleeping comfortably in bed at the time of report. Plan of care assumed. Safety precautions in place and call light within reach. Covid 19 surge in effect (tier 3)    0930: Dr. Hickman spoke with patient at bedside, wrote discharge orders.Patient educated on importance of attending follow up appointments and taking medications as prescribed. Patient verbalized understanding of instructions with no further questions at this time. Patient was escorted off unit by nursing staff via wheelchair with belongings in hand.

## 2022-01-27 NOTE — PROGRESS NOTES
Telemetry Shift Summary    Rhythm: SR  HR: 60  Ectopy: R-PAC    Measurements for strip printed 1/27/2021 at 0751  HR 56  0.16 / 0.08 / 0.50    Normal Values  Rhythm: SR  HR:   Measurements: 0.12-0.20 / 0.06-0.10 / 0.30-0.52

## 2022-01-27 NOTE — PROGRESS NOTES
Pt left home medications in hospitals possession. This RN attempted to look for pt in ER lobby but was not successful in finding her. This RN called pt's son, Manoj (listed in contacts of pt's chart) to notify him that the pt left behind her home medications. Per Manoj, either him or another family member will come  the medications. Charge RN made aware.

## 2022-01-27 NOTE — DISCHARGE INSTRUCTIONS
Discharge Instructions    Discharged to home by car with relative. Discharged via wheelchair, hospital escort: Yes.  Special equipment needed: Not Applicable    Be sure to schedule a follow-up appointment with your primary care doctor or any specialists as instructed.     Discharge Plan:   Diet Plan: Discussed  Activity Level: Discussed  Confirmed Follow up Appointment: Patient to Call and Schedule Appointment  Confirmed Symptoms Management: Discussed  Medication Reconciliation Updated: Yes  Influenza Vaccine Indication: Not indicated: Previously immunized this influenza season and > 8 years of age    I understand that a diet low in cholesterol, fat, and sodium is recommended for good health. Unless I have been given specific instructions below for another diet, I accept this instruction as my diet prescription.   Other diet: renal    Special Instructions: None    · Is patient discharged on Warfarin / Coumadin?   No     Depression / Suicide Risk    As you are discharged from this [facility] facility, it is important to learn how to keep safe from harming yourself.    Recognize the warning signs:  · Abrupt changes in personality, positive or negative- including increase in energy   · Giving away possessions  · Change in eating patterns- significant weight changes-  positive or negative  · Change in sleeping patterns- unable to sleep or sleeping all the time   · Unwillingness or inability to communicate  · Depression  · Unusual sadness, discouragement and loneliness  · Talk of wanting to die  · Neglect of personal appearance   · Rebelliousness- reckless behavior  · Withdrawal from people/activities they love  · Confusion- inability to concentrate     If you or a loved one observes any of these behaviors or has concerns about self-harm, here's what you can do:  · Talk about it- your feelings and reasons for harming yourself  · Remove any means that you might use to hurt yourself (examples: pills, rope, extension  cords, firearm)  · Get professional help from the community (Mental Health, Substance Abuse, psychological counseling)  · Do not be alone:Call your Safe Contact- someone whom you trust who will be there for you.  · Call your local CRISIS HOTLINE 506-7340 or 460-704-6290  · Call your local Children's Mobile Crisis Response Team [place] (835) 509-0733 or www.[employer]  · Call the toll free National Suicide Prevention Hotlines   · National Suicide Prevention Lifeline 534-548-QZQG (0283)  · National No Surprises Software Line Network 800-SUICIDE (941-2704)      Diagnóstico de insuficiencia cardíaca  Heart Failure, Diagnosis    La insuficiencia cardíaca significa que el corazón no puede bombear la andrae de la manera correcta. [place] dificulta el buen funcionamiento del organismo. La insuficiencia cardíaca por lo general es bernardo enfermedad a madi plazo (crónica). Es importante que se cuide mucho y que siga el plan de tratamiento que le indique donahue médico.  ¿Cuáles son las causas?  Esta afección puede ser causada por lo siguiente:  · Presión arterial meliza.  · Acumulación de colesterol y grasa en las arterias.  · Infarto de miocardio. [place] lesiona el músculo cardíaco.  · Válvulas cardíacas que no se abren y cierran correctamente.  · Daño en el músculo cardíaco. [place] también se denomina miocardiopatía.  · Enfermedad pulmonar.  · Ritmo cardíaco anormal.  ¿Qué incrementa el riesgo?  El riesgo de insuficiencia cardíaca aumenta con la edad. También es más probable que esta afección se manifieste en las personas que:  · Tienen sobrepeso.  · Son hombres.  · Fuman o mastican tabaco.  · Consumen alcohol o drogas ilegales en forma excesiva.  · Keith tomado medicamentos que pueden dañar el corazón.  · Tienen diabetes.  · Tienen ritmo cardíaco anormal.  · Tienen problemas de tiroides.  · Tienen un bajo recuento de glóbulos rojos (anemia).  ¿Cuáles son los signos o los síntomas?  Los síntomas de esta afección incluyen:  · Falta de  aire.  · Tos.  · Hinchazón de los pies, los tobillos, las piernas o el vientre.  · Pérdida de peso sin motivo.  · Dificultad para respirar.  · Despertarse con la necesidad de sentarse y roscoe aire.  · Latidos cardíacos rápidos.  · Mucho cansancio.  · Sentirse mareado o noemi si se fuera a desmayar.  · No tener ganas de comer.  · Sensación de que va a vomitar (náuseas).  · Hacer pis (orinar) más por la noche.  · Sentirse confundido.  ¿Cómo se trata?         El tratamiento de esta afección puede incluir:  · Medicamentos. Estos pueden administrarse para tratar la presión arterial y fortalecer los músculos cardíacos.  · Cambios en la augie diaria. Estos pueden incluir seguir bernardo dieta saludable, mantener un peso corporal saludable, dejar de consumir tabaco y drogas ilegales, o hacer ejercicios.  · Cirugía. La cirugía puede realizarse para abrir las válvulas obstruidas o para colocar dispositivos en el corazón, noemi un marcapasos.  · Un corazón kaleb (trasplante cardíaco). Recibirá un corazón chu de un donante.  Siga estas indicaciones en donahue casa:  · Trate otras afecciones noemi se lo haya indicado el médico. Estas pueden incluir presión arterial meliza, diabetes, enfermedad tiroidea o ritmos cardíacos anormales.  · Aprenda tanto noemi pueda sobre la insuficiencia cardíaca.  · Obtenga apoyo cuando la necesite.  · Concurra a todas las visitas de seguimiento noemi se lo haya indicado el médico. [place] es importante.  Resumen  · La insuficiencia cardíaca significa que el corazón no puede bombear la andrae de la manera correcta.  · La causa de esta afección es la presión arterial meliza, el infarto de miocardio o el daño del músculo cardíaco.  · Los síntomas de esta afección incluyen falta de aire e hinchazón de los pies, los tobillos, las piernas o el vientre. También puede sentirse muy cansado o tener ganas de vomitar.  · Puede recibir tratamiento con medicamentos, cirugía o cambios en donahue augie diaria.  · Trate otras afecciones noemi  se lo haya indicado el médico.  Esta información no tiene noemi fin reemplazar el consejo del médico. Asegúrese de hacerle al médico cualquier pregunta que tenga.  Document Released: 03/16/2010 Document Revised: 04/08/2020 Document Reviewed: 04/08/2020  Elsevier Patient Education © 2020 Elsevier Inc.      Insuficiencia cardíaca, cuidados personales  Heart Failure, Self Care  La insuficiencia cardíaca es bernardo afección grave. En kiya documento, se explica lo que debe hacer para cuidarse después de un diagnóstico de insuficiencia cardíaca. Pueden pedirle que modifique donahue dieta, tome medicamentos y miranda otros cambios en donahue estilo de augie para mantenerse lo más chu posible. El médico también podrá darle indicaciones más específicas. Comuníquese con el médico si tiene problemas o preguntas.  ¿Cuáles son los riesgos?  Tener insuficiencia cardíaca aumenta el riesgo de padecer ciertos problemas. Estos problemas pueden empeorar si no se cuida shantell. Estos problemas pueden ser:  · Problemas de coagulación. [place] puede provocar un accidente cerebrovascular.  · Daño en los riñones, los pulmones, el hígado o los pulmones.  · Ritmo cardíaco anormal.  Materiales necesarios:  · Balanza para controlar el peso.  · Monitor para medir la presión arterial.  · Un cuaderno.  · Medicamentos.  Cómo cuidarse cuando tiene insuficiencia cardíaca  Medicamentos  [place] los medicamentos de venta candelaria y los recetados solamente noemi se lo haya indicado el médico. Los medicamentos reducen la carga de trabajo del corazón, disminuyen la progresión de la insuficiencia cardíaca y mejoran los síntomas. [place] bhaskar medicamentos todos los días.  · No deje de roscoe bhaskar medicamentos, a menos que se lo indique donahue médico.  · No se saltee ninguna dosis de los medicamentos.  · Pida bernardo nueva receta antes de quedarse sin medicamentos.  Comida y bebida    · Consuma alimentos cardiosaludables. Hable con un nutricionista para crear un plan de alimentación que sea adecuado  para usted.  ? Opte por bernardo dieta que no contenga grasas trans y que sea baja en grasas saturadas y colesterol. Las opciones saludables incluyen frutas y verduras frescas o congeladas, pescado, jossie magras, legumbres, productos lácteos descremados o bajos en contenido de grasa y alimentos integrales o con alto contenido de fibra.  ? Limite el consumo de sal (sodio) si se lo indica donahue médico. La restricción de sodio puede reducir los síntomas de insuficiencia cardíaca. Hable con un nutricionista para aprender más sobre los condimentos cardiosaludables.  ? Use métodos de cocción saludables en lugar de freír. Los métodos de cocción saludables incluyen asar, emparrillar, hervir, hornear, cocer al vapor o saltear.  · Limite el consumo de líquidos si el médico se lo indica. La restricción de líquidos puede reducir los síntomas de la insuficiencia cardíaca.  Consumo de alcohol  · No milli alcohol si:  ? Donahue médico le indica no hacerlo.  ? El alcohol dañó el corazón o usted tiene insuficiencia cardíaca grave.  ? Está embarazada, puede estar embarazada o está tratando de quedar embarazada.  · Si martin alcohol:  ? Limite la cantidad que martin:  § De 0 a 1 medida por día para las mujeres.  § De 0 a 2 medidas por día para los hombres.  ? Esté atento a la cantidad de alcohol que hay en las bebidas que yanet. En los [place], bernardo medida equivale a bernardo botella de cerveza de 12 oz (355 ml), un vaso de vino de 5 oz (148 ml) o un vaso de bernardo bebida alcohólica de meliza graduación de 1½ oz (44 ml).  Estilo de augie    · No consuma ningún producto que contenga nicotina o tabaco, noemi cigarrillos, cigarrillos electrónicos y tabaco de mascar. Si necesita ayuda para dejar de fumar, consulte al médico.  ? No utilice chicles ni parches de nicotina antes de hablar con donahue médico.  · No consuma drogas ilegales.  · Trabaje con el médico para alcanzar el peso corporal adecuado de manera zarate.  · Shilpa actividad física si se lo indicó el médico.  Hable con donahue médico antes de comenzar cualquier ejercicio si:  ? Es un adulto mayor.  ? Tiene insuficiencia cardíaca grave.  · Aprenda a manejar el estrés. Si necesita ayuda para lograrlo, consulte al médico.  · Busque programas de rehabilitación o participe en ellos para mantener o mejorar donahue independencia y donahue calidad de augie.  · Planifique períodos de descanso para cuando se canse.  Monitoreo de información importante    · Controle donahue peso a diario. [place] lo ayudará a detectar si se está acumulando demasiado líquido en el cuerpo.  ? Hágalo todas las mañanas después de orinar y antes de desayunar.  ? Use la misma ropa cada vez que se pese.  ? Anote donahue peso todos los días. Lleve a donahue médico el registro de donahue peso.  · Debe controlar y registrar el pulso y la presión arterial según las indicaciones del médico.  Cómo sobrellevar las temperaturas extremas  · Si hace mucho calor:  ? Evite la actividad física intensa.  ? Use aire acondicionado o ventiladores, o busque un lugar más fresco.  ? Evite la cafeína y el alcohol.  ? Use ropa holgada, ligera y de colores riddhi.  · Si hace mucho frío:  ? Evite la actividad vigorosa.  ? Vístase con diferentes capas de ropa.  ? Use mitones o guantes, un sombrero y bernardo bufanda cuando salga.  ? Evite roscoe alcohol.  Siga estas instrucciones en donahue casa:  · Mantenga las vacunas al día. Las vacunas antineumocócica y contra la gripe (influenza) son especialmente importantes para prevenir infecciones en las vías respiratorias.  · Concurra a todas las visitas de seguimiento noemi se lo haya indicado el médico. [place] es importante.  Comuníquese con un médico si:  · Aumenta de peso rápidamente.  · Comienza a sentir un aumento de falta el aire.  · No puede participar en bhaskar actividades físicas habituales.  · Se cansa con facilidad.  · Tose más de lo normal, especialmente al realizar actividad física.  · Pierde el apetito o tiene náuseas.  · Observa que bhaskar mis, pies, tobillos o abdomen se le  hinchan o están más hinchados que lo habitual.  · Le valentina dormir debido a que le resulta difícil respirar.  · Siente que el corazón late rápido (tiene palpitaciones).  · Siente mareos o vahídos al ponerse de pie.  Solicite ayuda inmediatamente si:  · Tiene dificultad para respirar.  · Nota, o donahue bertha nota, cambios en donahue estado de alerta, por ejemplo, tiene dificultad para permanecer despierto o para concentrarse.  · Tiene dolor o molestias en el pecho.  · Se desmaya bernardo vez (síncope).  Estos síntomas pueden representar un problema grave que constituye bernardo emergencia. No espere a emiliano si los síntomas desaparecen. Solicite atención médica de inmediato. Comuníquese con el servicio de emergencias de donahue localidad (911 en los [place]). No conduzca por bhaskar propios medios hasta el hospital.  Resumen  · La insuficiencia cardíaca es bernardo afección grave. Para cuidarse, es posible que le pidan que cambie donahue dieta, tome ciertos medicamentos y miranda otros cambios en donahue estilo de augie.  · [place] bhaskar medicamentos todos los días. No deje de tomarlos, a menos que se lo indique donahue médico.  · Coma alimentos cardiosaludables noemi frutas y verduras frescas o congeladas, pescado, jossie magras, legumbres, productos lácteos descremados o bajos en contenido de grasa y alimentos integrales o con alto contenido de fibra.  · Pregúntele al médico si tiene alguna restricción en cuanto al alcohol. Es posible que deba dejar de beber alcohol si tiene insuficiencia cardíaca grave.  · Comuníquese con el médico si observa algún problema, noemi aumento de peso rápido o latidos cardíacos acelerados. Busque ayuda de inmediato si se desmaya, tiene dolor de pecho o dificultad para respirar.  Esta información no tiene noemi fin reemplazar el consejo del médico. Asegúrese de hacerle al médico cualquier pregunta que tenga.  Document Released: 04/29/2020 Document Revised: 04/29/2020 Document Reviewed: 04/29/2020  Elsevier Patient Education © 2020 Elsevier  Inc.      Síndrome de las piernas inquietas  Restless Legs Syndrome  El síndrome de las piernas inquietas es bernardo afección que causa incomodidad o molestias en las piernas, especialmente al estar sentado o acostado. Por lo general, estas sensaciones provocan la necesidad urgente de  las piernas. A veces, también puede afectar los brazos.  La afección puede ser de leve a grave. Los síntomas a menudo interfieren en la capacidad de dormir de bernardo persona.  ¿Cuáles son las causas?  Se desconoce la causa de esta afección.  ¿Qué incrementa el riesgo?  Los siguientes factores pueden hacer que usted sea más propenso a tener esta afección:  · Ser mayor de 50 años.  · Embarazo.  · Ser cora. En general, esta afección es más común en las mujeres que en los hombres.  · Antecedentes familiares de la enfermedad.  · Tener déficit de vanessa.  · Consumir cafeína, nicotina o alcohol de forma excesiva.  · Determinadas enfermedades, noemi enfermedad renal, enfermedad de Parkinson o daño nervioso.  · Determinados medicamentos, noemi aquellos para la presión arterial meliza, las náuseas, los resfríos, las alergias, la depresión y algunos trastornos cardíacos.  ¿Cuáles son los signos o los síntomas?  El síntoma principal de kiya trastorno son sensaciones molestas en las piernas, tales noemi:  · Tironeo.  · Hormigueo.  · Pinchazos.  · Punzadas.  · Algo que camina por la pierna.  · Quemazón.  Por lo general, las sensaciones:  · Afectan ambos lados del cuerpo.  · Son más intensas cuando está sentado o acostado.  · Empeoran por la noche. Estas pueden despertarlo o hacer que sea difícil conciliar el sueño.  · Hacen que tenga bernardo necesidad imperiosa de  las piernas.  · Se alivian de forma temporal al  las piernas.  Pueden afectar también los brazos, jaki esto es poco frecuente. Las personas con kiya trastorno suelen estar cansadas irma el día debido a la falta de sueño por la noche.  ¿Cómo se diagnostica?  Esta afección se puede  diagnosticar en función de lo siguiente:  · Eli síntomas.  · Análisis de andrae.  En algunos casos, un especialista puede controlarlo en un laboratorio del sueño (un estudio del sueño). [place] puede detectar cualquier alteración en el sueño.  ¿Cómo se trata?  Esta afección se trata controlando los síntomas. [place] puede incluir lo siguiente:  · Cambios en el estilo de augie, noemi hacer actividad física, usar técnicas de relajación y evitar la cafeína, el alcohol o el tabaco.  · Medicamentos. Los anticonvulsivos pueden probarse bairon.  Siga estas indicaciones en donahue casa:         Instrucciones generales  · [place] los medicamentos de venta candelaria y los recetados solamente noemi se lo haya indicado el médico.  · Use métodos para ayudar a aliviar las sensaciones molestas, por ejemplo:  ? Masajes en las piernas.  ? Caminar o hacer ejercicios de estiramiento.  ? Romi un baño de inmersión frío o caliente.  · Concurra a todas las visitas de control noemi se lo haya indicado el médico. [place] es importante.  Estilo de augie  · Adopte buenos hábitos de sueño. Por ejemplo, acuéstese y levántese a la misma hora todos los días. La mayoría de los adultos debería dormir entre 7 y 9 horas todas las noches.  · Shilpa ejercicio regularmente. Intente realizar al menos 30 minutos de ejercicio la mayoría de los días de la semana.  · Practique actividades que lo relajen, noemi yoga o meditación.  · Evite la cafeína y el alcohol.  · No consuma ningún producto que contenga nicotina o tabaco, noemi cigarrillos y cigarrillos electrónicos. Si necesita ayuda para dejar de fumar, consulte al médico.  Comuníquese con un médico si:  · Los síntomas empeoran o no mejoran con el tratamiento.  Resumen  · El síndrome de las piernas inquietas es bernardo afección que causa incomodidad o molestias en las piernas, especialmente al estar sentado o acostado.  · Los síntomas a menudo interfieren en la capacidad de dormir de bernardo persona.  · Esta afección se trata controlando  los síntomas. [place] vez deba hacer cambios en donahue estilo de augie o roscoe medicamentos.  Esta información no tiene noemi fin reemplazar el consejo del médico. Asegúrese de hacerle al médico cualquier pregunta que tenga.  Document Released: 09/27/2006 Document Revised: 02/27/2019 Document Reviewed: 02/27/2019  Elsevier Patient Education © 2020 Elsevier Inc.      Edema pulmonar  Pulmonary Edema  El edema pulmonar es la acumulación inusual de líquido en los pulmones. [place] dificulta la respiración. Esta afección es bernardo emergencia.  Siga estas indicaciones en donahue casa:  Medicamentos  · [place] los medicamentos de venta candelaria y los recetados solamente noemi se lo haya indicado el médico.  · Si le recetaron un antibiótico, tómelo noemi se lo haya indicado el médico. No deje de roscoe los antibióticos aunque comience a sentirse mejor.  · [place]ídale al médico que lo ayude a elaborar un plan con información sobre cada medicamento que yanet. [place] puede incluir lo siguiente:  ? El motivo por el que yanet esos medicamentos.  ? Los posibles efectos secundarios.  ? El mejor momento del día para tomarlos.  ? Con qué alimentos puede tomarlos o cuáles debe evitar.  ? Cuándo debe dejar de tomarlos.  · [place] bernardo lista de cada medicamento, vitamina o suplemento a base de hierbas que tome.  ? Lleve la lista siempre con usted.  ? Muéstrele la lista al médico en cada visita antes de comenzar a roscoe un medicamento nuevo.  ? Mantenga la lista actualizada.  Estilo de augie    · Miranda ejercicios con regularidad fabrice noemi le indicó el médico. Es importante que lo miranda de forma zarate. Puede hacer lo siguiente:  ? Preguntarle al médico cuáles son las actividades y los ejercicios adecuados y seguros para usted.  ? Realizar las actividades a un cierto ritmo. [place] evitará la falta de aire o el dolor en el pecho.  ? Descansar irma al menos 1 hora antes y después de las comidas.  ? Preguntar acerca de los programas de rehabilitación cardíaca. Estos pueden incluir lo  siguiente:  ? Educación.  ? Planes de ejercicios.  ? Psicoterapia.  · Implemente bernardo dieta cardiosaludable, con bajo contenido de sal, grasas saturadas y colesterol. El médico puede sugerirle alimentos ricos en fibra, noemi:  ? Frutas y verduras frescas.  ? Cereales integrales.  ? Frijoles.  · No consuma ningún producto que contenga nicotina o tabaco, noemi cigarrillos y cigarrillos electrónicos. Si necesita ayuda para dejar de fumar, consulte al médico.  Instrucciones generales  · Lleve un registro de donahue peso.  ? Registre el peso que tenía en el hospital o en la clínica. Cuando regrese a donahue casa, compare kiya peso con el que registra donahue balanza y anótelo.  ? Pésese todas las mañanas y registre el peso. Hágalo todas las mañanas después de orinar y antes de desayunar. Use la misma ropa cada vez que se pese.  ? Entréguele kiya registro de peso al médico. [place] podría ayudar al médico a determinar si donahue cuerpo está reteniendo líquido adicional.  ? Infórmele al médico de inmediato si aumenta de peso rápidamente o si aumenta de peso noemi se lo haya indicado el médico. Es posible que necesite realizar algún cambio con los medicamentos.  · Contrólese la presión arterial con la frecuencia que le haya indicado el médico.  ? Compre un tensiómetro de uso doméstico en la farmacia.  ? Registre las lecturas de la presión arterial. [place] cuando vuelva al consultorio.  · Mantenga un peso saludable. Pregúntele a donahue médico cuál es el peso saludable para usted.  · Piense en recibir terapia o formar parte de un juancho de apoyo.  · Concurra a todas las visitas de control noemi se lo haya indicado el médico. [place] es importante.  Comuníquese con un médico si:  · Tiene dudas relacionadas con los medicamentos.  · Omitió bernardo dosis del medicamento.  Solicite ayuda de inmediato si:  · Siente un dolor muy intenso en el pecho, especialmente si lo siente noemi bernardo opresión o un peso y se extiende hacia los brazos, la espalda, el crystal o la  mandíbula.  · Observa que tiene más hinchazón en las mis, los pies, los tobillos o el vientre (abdomen).  · Siente malestar estomacal (náuseas).  · Suda de forma inusual.  · Donahue piel se vuelve vinod o pálida.  · La falta de aire empeora.  · Tiene mareos o inestabilidad.  · Tiene visión borrosa.  · Tiene devi de sergey.  · Tose y elimina esputo sanguinolento.  · Le valentina dormir debido a que le resulta difícil respirar.  · Comienza a sentir que el corazón le “salta” o le “aletea” (palpitaciones) en el pecho de bernardo manera inusual.  · Siente que no recibe la cantidad de aire suficiente.  · Sube de peso rápidamente.  Estos síntomas pueden indicar bernardo emergencia. No espere hasta que los síntomas desaparezcan. Solicite atención médica de inmediato. Comuníquese con el servicio de emergencias de donahue localidad (911 en los [place]). No conduzca por bhaskar propios medios hasta el hospital.  Resumen  · El edema pulmonar es bernardo acumulación inusual de líquido en los pulmones que dificulta la respiración.  · Esta afección es bernardo emergencia.  · Lleve un registro de donahue peso. Llame al médico si aumenta de peso rápidamente.  Esta información no tiene noemi fin reemplazar el consejo del médico. Asegúrese de hacerle al médico cualquier pregunta que tenga.  Document Released: 01/20/2012 Document Revised: 08/29/2018 Document Reviewed: 08/29/2018  Elsevier Patient Education © 2020 Elsevier Inc.

## 2022-02-02 NOTE — PROGRESS NOTES
Cardiology Follow-up Consultation Note    Date of note:    2/3/2022    Primary Care Provider: ANGELITA Concepcion  Referring Provider: Aster Kahn D.O.     Patient Name: Tere Vargas     YOB: 1950  MRN:              [id number]    Chief Complaint: Follow-up CAD and hypertension    History of Present Illness: Ms. Tere Gallegos is a 71 y.o. female whose current medical problems include CAD status post PCI to RCA in 2016, paroxysmal A. fib, ESRD on dialysis, and hypertension who is here for follow-up follow-up.     services were used in the patient's primary language of [place].     Name or Number: [id number]  Mode of interpretation: iPad    Content of Interpretation:  The patient was previously seen in cardiology clinic by Dr. Borjas, last seen in 2020.  The patient did follow-up with Aster Metz PA-C, on 8/31/2021.  The patient was doing well during the last visit, and no changes were made to her medications.    The patient returns today for follow-up with her daughter.  The patient reports feeling well today.  She denies any chest pain or shortness of breath.  Denies any orthopnea, PND, or leg swelling.  No palpitations.  No syncope or presyncopal episodes.        Cardiovascular Risk Factors:  1. Smoking status: Never smoker  2. Type II Diabetes Mellitus: Diet controlled  Lab Results   Component Value Date/Time    HBA1C 5.7 (A) 12/08/2021 03:20 PM    HBA1C 5.7 (A) 08/03/2021 04:31 PM     3. Hypertension: None  4. Dyslipidemia: Diet controlled  Cholesterol,Tot   Date Value Ref Range Status   09/29/2020 125 100 - 199 mg/dL Final     LDL   Date Value Ref Range Status   09/29/2020 54 <100 mg/dL Final     HDL   Date Value Ref Range Status   09/29/2020 48 >=40 mg/dL Final     Triglycerides   Date Value Ref Range Status   09/29/2020 113 0 - 149 mg/dL Final     5. Family history of early Coronary Artery Disease in a first degree relative (Male less  than 55 years of age; Female less than 65 years of age): None  6.  Obesity and/or Metabolic Syndrome: BMI 27.81  7. Sedentary lifestyle: Not active    Review of Systems   Constitutional: Negative for fever, malaise/fatigue and night sweats.   Cardiovascular: Negative for chest pain, dyspnea on exertion, irregular heartbeat, leg swelling, near-syncope, orthopnea, palpitations, paroxysmal nocturnal dyspnea and syncope.   Respiratory: Negative for cough, shortness of breath and wheezing.    Gastrointestinal: Negative for abdominal pain, diarrhea, nausea and vomiting.   Neurological: Negative for dizziness, focal weakness and weakness.       All other systems reviewed and are negative.       Current Outpatient Medications   Medication Sig Dispense Refill   • atorvastatin (LIPITOR) 40 MG Tab Take 1 Tablet by mouth every evening. 90 Tablet 3   • pregabalin (LYRICA) 25 MG Cap Take 25 mg by mouth 2 times a day. Pt reports that it makes her dizzy and drunk, pt sometimes takes this medication     • amLODIPine (NORVASC) 10 MG Tab TOME FERNANDO TABLETA TODOS LOS MCCORMICK (Patient taking differently: Take 10 mg by mouth every day.) 90 Tablet 3   • sevelamer carbonate (RENVELA) 800 MG Tab tablet Take 1 Tablet by mouth 3 times a day with meals. (Patient taking differently: Take 800-1,600 mg by mouth 3 times a day with meals. Pt reports she takes 1-2 tablets when she has a meal) 270 Tablet 3   • apixaban (ELIQUIS) 2.5mg Tab Take 1 Tablet by mouth 2 times a day. Indications: Thromboembolism secondary to Atrial Fibrillation 60 Tablet 2   • carvedilol (COREG) 25 MG Tab TOME FERNANDO TABLETA DOS VECES AL DESMOND CON LAS COMIDAS (Patient taking differently: Take 25 mg by mouth every day. Per pt only takes once a day, makes her feel sick) 180 tablet 3   • lisinopril (PRINIVIL) 40 MG tablet Take 1 tablet by mouth every day. 90 tablet 3   • hydrALAZINE (APRESOLINE) 100 MG tablet Take 1 tablet by mouth 2 times a day. (Patient taking differently: Take 100 mg  by mouth every day. Per pt only takes once a day, makes her feel sick) 180 tablet 3   • pioglitazone (ACTOS) 30 MG Tab Take 1 Tab by mouth every day. 90 Tab 3   • levothyroxine (SYNTHROID) 50 MCG Tab Take 1 Tab by mouth Every morning on an empty stomach. 90 Tab 3   • ROPINIRole (REQUIP) 1 MG Tab TOME FERNANDO TABLETA DOS VECES AL DESMOND (Patient not taking: Reported on 2/3/2022) 180 Tablet 1     No current facility-administered medications for this visit.         No Known Allergies      Physical Exam:  Ambulatory Vitals  /58 (BP Location: Right arm, Patient Position: Sitting, BP Cuff Size: Adult)   Pulse 66   Resp 14   Ht 1.524 m (5')   Wt 64.6 kg (142 lb 6.4 oz)   SpO2 96%    Oxygen Therapy:  Pulse Oximetry: 96 %  BP Readings from Last 4 Encounters:   02/03/22 110/58   01/27/22 125/43   12/08/21 128/62   11/10/21 124/61       Weight/BMI: Body mass index is 27.81 kg/m².  Wt Readings from Last 4 Encounters:   02/03/22 64.6 kg (142 lb 6.4 oz)   01/27/22 61.6 kg (135 lb 12.9 oz)   12/08/21 66.2 kg (146 lb)   11/10/21 66.2 kg (145 lb 15.1 oz)         General: Well appearing and in no apparent distress  Eyes: nl conjunctiva, no icteric sclera  ENT: wearing a mask, normal external appearance of ears  Neck: no visible JVP,  no carotid bruits  Lungs: normal respiratory effort, CTAB  Heart: RRR, no murmurs, no rubs or gallops,  no edema bilateral lower extremities. No LV/RV heave on cardiac palpatation. + bilateral radial pulses.  + bilateral dp pulses.   Abdomen: soft, non tender, non distended, no masses, normal bowel sounds.  No HSM.  Extremities/MSK: no clubbing, no cyanosis  Neurological: No focal sensory deficits  Psychiatric: Appropriate affect, A/O x 3, intact judgement and insight  Skin: Warm extremities      Lab Data Review:  Lab Results   Component Value Date/Time    CHOLSTRLTOT 125 09/29/2020 10:56 AM    LDL 54 09/29/2020 10:56 AM    HDL 48 09/29/2020 10:56 AM    TRIGLYCERIDE 113 09/29/2020 10:56 AM       Lab  Results   Component Value Date/Time    SODIUM 139 01/26/2022 02:21 AM    POTASSIUM 4.6 01/26/2022 02:21 AM    CHLORIDE 99 01/26/2022 02:21 AM    CO2 30 01/26/2022 02:21 AM    GLUCOSE 73 01/26/2022 02:21 AM    BUN 15 01/26/2022 02:21 AM    CREATININE 4.16 (H) 01/26/2022 02:21 AM    BUNCREATRAT 8 (L) 01/28/2021 07:00 AM     Lab Results   Component Value Date/Time    ALKPHOSPHAT 87 01/24/2022 10:29 PM    ASTSGOT 19 01/24/2022 10:29 PM    ALTSGPT 15 01/24/2022 10:29 PM    TBILIRUBIN 0.4 01/24/2022 10:29 PM      Lab Results   Component Value Date/Time    WBC 2.1 (LL) 01/26/2022 02:21 AM     Lab Results   Component Value Date/Time    HBA1C 5.7 (A) 12/08/2021 03:20 PM    HBA1C 5.7 (A) 08/03/2021 04:31 PM         Cardiac Imaging and Procedures Review:     EKG dated 1/25/2022: My personal interpretation is Sinus rhythm, right axis deviation, consider left ventricular hypertrophy    Echo 8/12/2021:  CONCLUSIONS  Normal left ventricular size, wall thickness, and systolic function.  Mildly dilated left atrium.  Mitral annular calcification.  Aortic sclerosis without stenosis.  No pericardial effusion seen.     Echo 1/23/20:  CONCLUSIONS  Compared to the images of the study done 12/23/18, RVSP previously   severely elevated, could not be assessed on the study, otherwise no   significant change.  Left ventricular ejection fraction is visually estimated to be 60%.  Mild concentric left ventricular hypertrophy.  Grade II diastolic dysfunction.  Moderately dilated left atrium.  Mild mitral regurgitation.  Unable to estimate pulmonary artery pressure due to an inadequate   tricuspid regurgitant jet.  Trivial/physiologic pericardial fluid.     Stress Test 10/20/20:  Report   NUCLEAR IMAGING INTERPRETATION   Normal left ventricular perfusion with no fixed or reversible defects.    Normal left ventricular wall motion, with EF of 68%.    ECG INTERPRETATION   Negative stress ECG for ischemia.     [facility] 9/7/2016:  1.  LV was not entered.   Blood pressure was as mentioned above quite elevated   At the beginning of the case.  We did give her several doses of intracoronary   nitroglycerin throughout the case.  Blood pressures towards the end of the   case were in the 120-130 systolic.     2.  Single-vessel coronary artery disease.       The left main is a large caliber vessel that is angiographically free of disease.   It gave off a large left anterior descending artery and codominant left circumflex artery.   Left anterior descending artery is around 3 mm in diameter proximally.  It     gave off 2 medium-sized diagonal branches in proximal and mid segment.  It   has minor irregularity, but no significant disease seen.  Antegrade flow was   normal.  Left anterior descending artery provides some collaterals through   septal arcade and the posterior descending artery.       Left circumflex artery is a codominant system, is quite large, probably   about 4 mm in diameter proximally.  It gives rise to 2 or 3 large obtuse   marginal branches in the mid segment and several small medium-sized obtuse   marginal branches distally.  There is no significant disease in the left   circumflex artery or its major branches.     The right coronary artery is a medium sized vessel around 2.5 mm in diameter.    Proximally it is occluded after it give off a conus branch about 2 cm or so   from its origin with small bridging collateral and probable a micro channel into a   diffusely diseased [id number] right coronary artery and posterior   descending artery.  There was no significant PLV originating from the right   coronary artery.     After intervention, the stented segment in mid right coronary artery has no residual   stenosis with IRIS-3 antegrade flow into moderately diseased distal right   coronary artery and posterior descending artery.     PostOp Diagnosis: s/p NOEMI 2.5 and 2.25 mm Synergy stents to mid RCA with 0% residual in stented segment, IRIS III     [facility]  6/8/2021:  POSTOPERATIVE DIAGNOSIS:  1.  Nonobstructive coronary artery disease.  2.  Widely patent previously placed RCA stents  3.  LVEF 70%, LVEDP 22 mmHg     Stress test 8/13/2021:  NUCLEAR IMAGING INTERPRETATION   Normal left ventricular size, ejection fraction, and wall motion.   No evidence of significant jeopardized viable myocardium or prior myocardial    infarction.   ECG INTERPRETATION   Nondiagnostic ECG portion of a regadenoson nuclear stress test.    Assessment & Plan     1. Essential hypertension     2. Coronary artery disease due to lipid rich plaque  atorvastatin (LIPITOR) 40 MG Tab   3. S/P coronary artery stent placement  atorvastatin (LIPITOR) 40 MG Tab   4. Paroxysmal atrial fibrillation (HCC)     5. Dyslipidemia  atorvastatin (LIPITOR) 40 MG Tab         Shared Medical Decision Making:    Essential hypertension   BP well controlled   -Continue carvedilol 25 mg twice daily, hydralazine 100 mg twice daily, and lisinopril 40 daily     CAD s/p NOEMI to the RCA 2016  [facility] 6/8/2021 with non obstructive CAD, patent RCA stent.  Asymptomatic  -Restart statin  -Continue  Eliquis and carvedilol    Dyslipidemia   -Continue statin     Paroxysmal atrial fibrillation  Currently in sinus rhythm  -Continue Plavix and low dose Eliquis    All of the patient's excellent questions were answered to the best of my knowledge and to her satisfaction.  It was a pleasure seeing Ms. Tere Gallegos in my clinic today. Return in about 6 months (around 8/3/2022). Patient is aware to call the cardiology clinic with any questions or concerns.      Milton Petitt MD  [facility], [address]  [address] [address]  Phone: [phone]  Fax: [phone]

## 2022-02-03 ENCOUNTER — OFFICE VISIT (OUTPATIENT)
Dept: CARDIOLOGY | Facility: MEDICAL CENTER | Age: 72
End: 2022-02-03
Payer: MEDICARE

## 2022-02-03 VITALS
RESPIRATION RATE: 14 BRPM | SYSTOLIC BLOOD PRESSURE: 110 MMHG | OXYGEN SATURATION: 96 % | WEIGHT: 142.4 LBS | HEART RATE: 66 BPM | DIASTOLIC BLOOD PRESSURE: 58 MMHG | HEIGHT: 60 IN | BODY MASS INDEX: 27.96 KG/M2

## 2022-02-03 DIAGNOSIS — Z95.5 S/P CORONARY ARTERY STENT PLACEMENT: ICD-10-CM

## 2022-02-03 DIAGNOSIS — I48.0 PAROXYSMAL ATRIAL FIBRILLATION (HCC): ICD-10-CM

## 2022-02-03 DIAGNOSIS — I10 ESSENTIAL HYPERTENSION: ICD-10-CM

## 2022-02-03 DIAGNOSIS — E78.5 DYSLIPIDEMIA: ICD-10-CM

## 2022-02-03 DIAGNOSIS — I25.10 CORONARY ARTERY DISEASE DUE TO LIPID RICH PLAQUE: ICD-10-CM

## 2022-02-03 DIAGNOSIS — I25.83 CORONARY ARTERY DISEASE DUE TO LIPID RICH PLAQUE: ICD-10-CM

## 2022-02-03 PROCEDURE — 99214 OFFICE O/P EST MOD 30 MIN: CPT | Performed by: STUDENT IN AN ORGANIZED HEALTH CARE EDUCATION/TRAINING PROGRAM

## 2022-02-03 RX ORDER — ATORVASTATIN CALCIUM 40 MG/1
40 TABLET, FILM COATED ORAL NIGHTLY
Qty: 90 TABLET | Refills: 3 | Status: SHIPPED | OUTPATIENT
Start: 2022-02-03 | End: 2022-08-24

## 2022-02-03 ASSESSMENT — ENCOUNTER SYMPTOMS
COUGH: 0
IRREGULAR HEARTBEAT: 0
SHORTNESS OF BREATH: 0
FEVER: 0
ABDOMINAL PAIN: 0
PND: 0
VOMITING: 0
FOCAL WEAKNESS: 0
NIGHT SWEATS: 0
DIZZINESS: 0
DYSPNEA ON EXERTION: 0
SYNCOPE: 0
WHEEZING: 0
NEAR-SYNCOPE: 0
DIARRHEA: 0
PALPITATIONS: 0
NAUSEA: 0
ORTHOPNEA: 0
WEAKNESS: 0

## 2022-02-03 ASSESSMENT — MINNESOTA LIVING WITH HEART FAILURE QUESTIONNAIRE (MLHF)
SWELLING IN ANKLES OR LEGS: 1
DIFFICULTY WITH RECREATIONAL PASTIMES, SPORTS, HOBBIES: 0
DIFFICULTY WITH SEXUAL ACTIVITIES: 1
HAVING TO SIT OR LIE DOWN DURING THE DAY: 1
DIFFICULTY GOING AWAY FROM HOME: 5
MAKING YOU STAY IN A HOSPITAL: 0
DIFFICULTY WORKING TO EARN A LIVING: 0
WORKING AROUND THE HOUSE OR YARD DIFFICULT: 1
COSTING YOU MONEY FOR MEDICAL CARE: 0
DIFFICULTY SOCIALIZING WITH FAMILY OR FRIENDS: 0
TIRED, FATIGUED OR LOW ON ENERGY: 0
TOTAL_SCORE: 16
MAKING YOU WORRY: 0
FEELING LIKE A BURDEN TO FAMILY AND FRIENDS: 0
MAKING YOU FEEL DEPRESSED: 0
EATING LESS FOODS YOU LIKE: 0
LOSS OF SELF CONTROL IN YOUR LIFE: 0
DIFFICULTY TO CONCENTRATE OR REMEMBERING THINGS: 0
DIFFICULTY SLEEPING WELL AT NIGHT: 1
MAKING YOU SHORT OF BREATH: 5
GIVING YOU SIDE EFFECTS FROM TREATMENTS: 0
WALKING ABOUT OR CLIMBING STAIRS DIFFICULT: 1

## 2022-02-03 ASSESSMENT — 6 MINUTE WALK TEST (6MWT): TOTAL DISTANCE WALKED (METERS): 366

## 2022-02-03 ASSESSMENT — FIBROSIS 4 INDEX: FIB4 SCORE: 2.74

## 2022-02-16 ENCOUNTER — APPOINTMENT (OUTPATIENT)
Dept: MEDICAL GROUP | Facility: MEDICAL CENTER | Age: 72
End: 2022-02-16
Payer: MEDICARE

## 2022-03-29 ENCOUNTER — DOCUMENTATION (OUTPATIENT)
Dept: VASCULAR LAB | Facility: MEDICAL CENTER | Age: 72
End: 2022-03-29
Payer: MEDICARE

## 2022-03-30 NOTE — PROGRESS NOTES
[facility]    Utilized translation services for this encounter.  Language Line - [phone]  [employer] ID - [id number]   [place], ID # [id number]    Called patient to reschedule missed appointment for next Monday.     Curly Corbett, PharmD

## 2022-03-31 DIAGNOSIS — E11.22 CONTROLLED TYPE 2 DIABETES MELLITUS WITH CHRONIC KIDNEY DISEASE ON CHRONIC DIALYSIS, WITHOUT LONG-TERM CURRENT USE OF INSULIN (HCC): ICD-10-CM

## 2022-03-31 DIAGNOSIS — E03.8 SUBCLINICAL HYPOTHYROIDISM: ICD-10-CM

## 2022-03-31 DIAGNOSIS — N18.6 CONTROLLED TYPE 2 DIABETES MELLITUS WITH CHRONIC KIDNEY DISEASE ON CHRONIC DIALYSIS, WITHOUT LONG-TERM CURRENT USE OF INSULIN (HCC): ICD-10-CM

## 2022-03-31 DIAGNOSIS — Z99.2 CONTROLLED TYPE 2 DIABETES MELLITUS WITH CHRONIC KIDNEY DISEASE ON CHRONIC DIALYSIS, WITHOUT LONG-TERM CURRENT USE OF INSULIN (HCC): ICD-10-CM

## 2022-03-31 RX ORDER — PIOGLITAZONEHYDROCHLORIDE 30 MG/1
TABLET ORAL
Qty: 90 TABLET | Refills: 3 | Status: SHIPPED | OUTPATIENT
Start: 2022-03-31 | End: 2022-11-08

## 2022-03-31 RX ORDER — LEVOTHYROXINE SODIUM 0.05 MG/1
TABLET ORAL
Qty: 90 TABLET | Refills: 3 | Status: SHIPPED | OUTPATIENT
Start: 2022-03-31 | End: 2022-08-24

## 2022-04-04 ENCOUNTER — ANTICOAGULATION VISIT (OUTPATIENT)
Dept: VASCULAR LAB | Facility: MEDICAL CENTER | Age: 72
End: 2022-04-04
Attending: INTERNAL MEDICINE
Payer: MEDICARE

## 2022-04-04 DIAGNOSIS — I48.0 PAROXYSMAL A-FIB (HCC): ICD-10-CM

## 2022-04-04 DIAGNOSIS — D68.69 SECONDARY HYPERCOAGULABLE STATE (HCC): ICD-10-CM

## 2022-04-04 PROCEDURE — 99212 OFFICE O/P EST SF 10 MIN: CPT | Performed by: NURSE PRACTITIONER

## 2022-04-04 RX ORDER — CLOPIDOGREL BISULFATE 75 MG/1
75 TABLET ORAL DAILY
COMMUNITY
End: 2022-08-24

## 2022-04-04 NOTE — PROGRESS NOTES
Diagnosis: AF  Drug: Eliquis 2.5 mg BID (low dose per cardiology)  LOT: Indefinite  CHADSVASC: 5  HAS-BLED: 3 (age, ckd, clopiodgrel)    Patient is here with a family member who would like to translate for today's visit. I offered language line but they decline.    Health Status Since Last Assessment  Any new relevant medical problems, ED visits/hospitalizations - hospitalized in January with CP, SOB, LE edema. Diagnosed with pulm edema and she received extra dialysis. Reviewed her medications. Per her last note with Dr Pettit, she is to continue taking clopidogrel. Her family member is not sure if the patient is taking this but will check her medications when they get home.  No problems with bleeding on Eliquis. No s/sx of stroke. She is currently not on the kidney transplant list but is hoping to get back on once she has medicare part D. Eliquis is affordable.  Hasn't had labs done since her hospitalization. Last h/h 7.8/24.8. Encouraged to make an appt with PCP to discuss labs. I will order another CBC which pt will have drawn at her next dialysis appt.    Any embolic events (stroke / TIA / systemic embolism) - none    Adherence with DOAC Therapy  0 missed dose(s)  BLEEDING RISK ASSESSMENT NB:    Bleeding Risk Assessment  Severe epistaxis - no   Hemoptysis - no  Excessive or unusual bruising / hematomas - no  GIB/melena/BRBPR/hematemesis - no  Hematuria - no   Abnormal vaginal bleeding - no  Concerning daily headache or subdural hematoma symptoms - no  Decreasing hemoglobin or new anemia - no  Falls, presyncope, syncope, or seizures - no  Platelets: 129  Latest hemoglobin:     Lab Results   Component Value Date/Time    WBC 2.1 (LL) 01/26/2022 02:21 AM    RBC 2.60 (L) 01/26/2022 02:21 AM    HEMOGLOBIN 7.8 (L) 01/26/2022 02:21 AM    HEMATOCRIT 24.8 (L) 01/26/2022 02:21 AM    MCV 95.4 01/26/2022 02:21 AM    MCH 30.0 01/26/2022 02:21 AM    MCHC 31.5 (L) 01/26/2022 02:21 AM    MPV 11.3 01/26/2022 02:21 AM    NEUTSPOLYS  64.10 01/24/2022 10:29 PM    LYMPHOCYTES 22.20 01/24/2022 10:29 PM    MONOCYTES 10.90 01/24/2022 10:29 PM    EOSINOPHILS 1.60 01/24/2022 10:29 PM    BASOPHILS 0.80 01/24/2022 10:29 PM    ANISOCYTOSIS 1+ 12/22/2018 07:21 PM        HAS-BLED:  Hypertension (uncontrolled, >160 mmHg systolic) - no  Renal disease (dialysis, transplant, Cr >2.26 mg/dL or >200 µmol/L) - yes  Liver disease (cirrhosis or bilirubin >2x normal with AST/ALT/AP >3x normal) - no  Stroke history - no  Prior major bleeding or predisposition to bleeding - no  Labile INR Unstable/high INRs, time in therapeutic range <60% - no  Age >65 - yes  Medication usage predisposing to bleeding (aspirin, clopidogrel, NSAIDs) - yes  Alcohol use  ?8 drinks/week - denies      Creatinine Clearance/Renal Function  Latest eGFR / creatinine:  Lab Results   Component Value Date/Time    SODIUM 139 01/26/2022 02:21 AM    POTASSIUM 4.6 01/26/2022 02:21 AM    CHLORIDE 99 01/26/2022 02:21 AM    CO2 30 01/26/2022 02:21 AM    GLUCOSE 73 01/26/2022 02:21 AM    BUN 15 01/26/2022 02:21 AM    CREATININE 4.16 (H) 01/26/2022 02:21 AM    BUNCREATRAT 8 (L) 01/28/2021 07:00 AM      • Is eGFR less than 50ml/min  - yes, CKD on dialysis  Cr 4.16 ml/min in Jan 2022.    If YES, calculate CrCl (see back)  Any recent dehydrating illness or medications added/changed? i.e. Diuretics      Drug Interactions  ASA/antiplatelets - on clopidogrel for CAD  NSAID - none  Other drug interactions -  (Review med list / OTCs;)  Current Outpatient Medications on File Prior to Visit   Medication Sig Dispense Refill   • levothyroxine (SYNTHROID) 50 MCG Tab TOME FERNANDO TABLETA POR VIA ORAL CADA MANANA ON AN EMPTY STOMACH 90 Tablet 3   • pioglitazone (ACTOS) 30 MG Tab TOME FERNANDO TABLETA TODOS LOS MCCORMICK 90 Tablet 3   • atorvastatin (LIPITOR) 40 MG Tab Take 1 Tablet by mouth every evening. 90 Tablet 3   • ROPINIRole (REQUIP) 1 MG Tab TOME FERNANDO TABLETA DOS WYATT AL DESMOND (Patient not taking: Reported on 2/3/2022) 180 Tablet  1   • pregabalin (LYRICA) 25 MG Cap Take 25 mg by mouth 2 times a day. Pt reports that it makes her dizzy and drunk, pt sometimes takes this medication     • amLODIPine (NORVASC) 10 MG Tab TOME FERNANDO TABLETA TODOS LOS MCCORMICK (Patient taking differently: Take 10 mg by mouth every day.) 90 Tablet 3   • sevelamer carbonate (RENVELA) 800 MG Tab tablet Take 1 Tablet by mouth 3 times a day with meals. (Patient taking differently: Take 800-1,600 mg by mouth 3 times a day with meals. Pt reports she takes 1-2 tablets when she has a meal) 270 Tablet 3   • apixaban (ELIQUIS) 2.5mg Tab Take 1 Tablet by mouth 2 times a day. Indications: Thromboembolism secondary to Atrial Fibrillation 60 Tablet 2   • carvedilol (COREG) 25 MG Tab TOME FERNANDO TABLETA DOS VECES AL DESMOND CON LAS COMIDAS (Patient taking differently: Take 25 mg by mouth every day. Per pt only takes once a day, makes her feel sick) 180 tablet 3   • lisinopril (PRINIVIL) 40 MG tablet Take 1 tablet by mouth every day. 90 tablet 3   • hydrALAZINE (APRESOLINE) 100 MG tablet Take 1 tablet by mouth 2 times a day. (Patient taking differently: Take 100 mg by mouth every day. Per pt only takes once a day, makes her feel sick) 180 tablet 3     No current facility-administered medications on file prior to visit.     Verified no anticonvulsant or azole therapy, education provided for future use.    Significant gait impairment / imbalance / high risk for falls - age is a risk    Final Assessment and Recommendations:  Patient appears stable from the anticoagulation standpoint  Benefits of continued DOAC therapy outweigh risks for this patient  Recommend continue current DOAC at same dose  She will continue low dose Eliquis per cards recommendation.    - Continue taking Eliquis 2.5 mg by mouth twice daily    Other Actions:   CBC, BMP this week and again in 6 months.    Follow up:  Will follow up with patient in 6 months per pt's request due to multiple medical appts and dialysis.  I will  review labs when I receive them. Pt to have them drawn this week.    Ana Luisa Garcia APRKAYKAY    Addendum 5/5/22 -   Reviewed labs for Eliquis from [facility] in media tab. H/h 9.8/29.8 which are improved from Jan 2022. Platelet count chronically low, most recently 120. Will plan to f/u with pt in Oct in antico clinic. Continue Eliquis 2.5 mg BID low dose per cards.

## 2022-04-04 NOTE — PROGRESS NOTES
Target end date: Indefinite  Indication: AF  Drug: Eliquis 2.5mg BID  CHADsVASC = 5 (age, se    Health Status Since Last Assessment   Patient denies any new relevant medical problems, ED visits or hospitalizations   Patient denies any embolic events (stroke/tia/systemic embolism)    Adherence with DOAC Therapy   Pt has *** missed any doses in the average week    Bleeding Risk Assessment     *** Epistaxis   Pt denies any excessive or unusual bleeding/hematomas.  Pt denies any GI bleeds or hematemesis.  Pt denies any concerning daily headache or sub dural hematoma symptoms.     Pt denies any hematuria ***   Latest Hemoglobin ***   ETOH overuse ***     Creatinine Clearance/Renal Function     Latest ClCr ***    Hepatic function   Latest LFTs ***   Pt denies any history of liver dysfunction      Drug Interactions   Platelets: ***   ASA/other antiplatelets ***   NSAID ***   Other drug interactions ***   *** Verified no anticonvulsant or azole therapy, education provided for future use.     Examination   Blood Pressure ***   Symptomatic hypotension ***   Significant gait impairment/imbalance/high risk for falls? ***    Final Assessment and Recommendations:   Patient appears stable from the anticoagulation standpoint.     Benefits of continued DOAC therapy outweigh risks for this patient   Recommend pt continue with current DOAC therapy *** (with dose adjustment)   DOAC is *** affordable     Other Actions: cmp/ cbc hemogram ordered prior to next visit    Follow up:   Will follow up with patient ***  months.

## 2022-04-05 ENCOUNTER — HOSPITAL ENCOUNTER (OUTPATIENT)
Dept: LAB | Facility: MEDICAL CENTER | Age: 72
End: 2022-04-05
Attending: NURSE PRACTITIONER
Payer: MEDICARE

## 2022-04-21 ENCOUNTER — TELEPHONE (OUTPATIENT)
Dept: VASCULAR LAB | Facility: MEDICAL CENTER | Age: 72
End: 2022-04-21
Payer: MEDICARE

## 2022-05-04 ENCOUNTER — ANTICOAGULATION MONITORING (OUTPATIENT)
Dept: VASCULAR LAB | Facility: MEDICAL CENTER | Age: 72
End: 2022-05-04
Payer: MEDICARE

## 2022-05-04 DIAGNOSIS — I48.0 PAROXYSMAL A-FIB (HCC): ICD-10-CM

## 2022-05-04 DIAGNOSIS — D68.69 SECONDARY HYPERCOAGULABLE STATE (HCC): ICD-10-CM

## 2022-05-05 ENCOUNTER — DOCUMENTATION (OUTPATIENT)
Dept: VASCULAR LAB | Facility: MEDICAL CENTER | Age: 72
End: 2022-05-05
Payer: MEDICARE

## 2022-05-23 NOTE — TELEPHONE ENCOUNTER
Left vm for pt to have CBC + BMP drawn as soon as she can. Will await results.    Ana Luisa CHAPA      
Unknown if ever smoked

## 2022-06-10 DIAGNOSIS — Z01.818 ENCOUNTER FOR PRE-TRANSPLANT EVALUATION FOR KIDNEY TRANSPLANT: ICD-10-CM

## 2022-06-10 DIAGNOSIS — Z76.82 KIDNEY TRANSPLANT CANDIDATE: Chronic | ICD-10-CM

## 2022-06-13 ENCOUNTER — TELEPHONE (OUTPATIENT)
Dept: MEDICAL GROUP | Facility: MEDICAL CENTER | Age: 72
End: 2022-06-13
Payer: MEDICARE

## 2022-06-13 DIAGNOSIS — Z12.31 SCREENING MAMMOGRAM, ENCOUNTER FOR: ICD-10-CM

## 2022-06-16 ENCOUNTER — HOSPITAL ENCOUNTER (OUTPATIENT)
Dept: RADIOLOGY | Facility: MEDICAL CENTER | Age: 72
End: 2022-06-16
Attending: NURSE PRACTITIONER
Payer: MEDICARE

## 2022-06-16 DIAGNOSIS — Z12.31 SCREENING MAMMOGRAM, ENCOUNTER FOR: ICD-10-CM

## 2022-06-16 PROCEDURE — 77063 BREAST TOMOSYNTHESIS BI: CPT

## 2022-06-20 ENCOUNTER — APPOINTMENT (OUTPATIENT)
Dept: MEDICAL GROUP | Facility: MEDICAL CENTER | Age: 72
End: 2022-06-20
Payer: MEDICARE

## 2022-06-20 ENCOUNTER — HOSPITAL ENCOUNTER (OUTPATIENT)
Dept: RADIOLOGY | Facility: MEDICAL CENTER | Age: 72
End: 2022-06-20
Attending: INTERNAL MEDICINE
Payer: MEDICARE

## 2022-06-20 DIAGNOSIS — Z76.82 KIDNEY TRANSPLANT CANDIDATE: ICD-10-CM

## 2022-06-20 DIAGNOSIS — Z01.818 ENCOUNTER FOR PRE-TRANSPLANT EVALUATION FOR KIDNEY TRANSPLANT: ICD-10-CM

## 2022-06-20 PROCEDURE — 71046 X-RAY EXAM CHEST 2 VIEWS: CPT

## 2022-06-22 ENCOUNTER — HOSPITAL ENCOUNTER (OUTPATIENT)
Dept: RADIOLOGY | Facility: MEDICAL CENTER | Age: 72
End: 2022-06-22
Payer: MEDICARE

## 2022-06-26 DIAGNOSIS — Z99.2 ESRD (END STAGE RENAL DISEASE) ON DIALYSIS (HCC): ICD-10-CM

## 2022-06-26 DIAGNOSIS — N18.6 ESRD (END STAGE RENAL DISEASE) ON DIALYSIS (HCC): ICD-10-CM

## 2022-06-26 DIAGNOSIS — I10 ESSENTIAL HYPERTENSION: ICD-10-CM

## 2022-06-27 RX ORDER — ROPINIROLE 1 MG/1
TABLET, FILM COATED ORAL
Qty: 180 TABLET | Refills: 1 | Status: SHIPPED | OUTPATIENT
Start: 2022-06-27 | End: 2022-07-18 | Stop reason: SDUPTHER

## 2022-06-27 RX ORDER — LISINOPRIL 40 MG/1
TABLET ORAL
Qty: 90 TABLET | Refills: 3 | Status: SHIPPED | OUTPATIENT
Start: 2022-06-27 | End: 2022-11-08

## 2022-07-18 ENCOUNTER — OFFICE VISIT (OUTPATIENT)
Dept: MEDICAL GROUP | Facility: MEDICAL CENTER | Age: 72
End: 2022-07-18
Payer: MEDICARE

## 2022-07-18 VITALS
SYSTOLIC BLOOD PRESSURE: 132 MMHG | DIASTOLIC BLOOD PRESSURE: 56 MMHG | BODY MASS INDEX: 22.88 KG/M2 | HEART RATE: 71 BPM | HEIGHT: 64 IN | WEIGHT: 134 LBS | TEMPERATURE: 97.9 F | OXYGEN SATURATION: 97 %

## 2022-07-18 DIAGNOSIS — N18.6 CONTROLLED TYPE 2 DIABETES MELLITUS WITH CHRONIC KIDNEY DISEASE ON CHRONIC DIALYSIS, WITHOUT LONG-TERM CURRENT USE OF INSULIN (HCC): ICD-10-CM

## 2022-07-18 DIAGNOSIS — I50.32 CHRONIC DIASTOLIC HEART FAILURE (HCC): ICD-10-CM

## 2022-07-18 DIAGNOSIS — M25.561 CHRONIC PAIN OF RIGHT KNEE: ICD-10-CM

## 2022-07-18 DIAGNOSIS — Z00.00 MEDICARE ANNUAL WELLNESS VISIT, INITIAL: ICD-10-CM

## 2022-07-18 DIAGNOSIS — E03.9 ACQUIRED HYPOTHYROIDISM: ICD-10-CM

## 2022-07-18 DIAGNOSIS — I25.10 CORONARY ARTERY DISEASE DUE TO LIPID RICH PLAQUE: Chronic | ICD-10-CM

## 2022-07-18 DIAGNOSIS — Z95.5 PRESENCE OF DRUG-ELUTING STENT IN RIGHT CORONARY ARTERY: Chronic | ICD-10-CM

## 2022-07-18 DIAGNOSIS — G25.81 RLS (RESTLESS LEGS SYNDROME): ICD-10-CM

## 2022-07-18 DIAGNOSIS — G63 NEUROPATHY ASSOCIATED WITH ENDOCRINE DISORDER (HCC): ICD-10-CM

## 2022-07-18 DIAGNOSIS — Z99.2 ESRD (END STAGE RENAL DISEASE) ON DIALYSIS (HCC): ICD-10-CM

## 2022-07-18 DIAGNOSIS — D63.8 ANEMIA, CHRONIC DISEASE: ICD-10-CM

## 2022-07-18 DIAGNOSIS — G89.29 CHRONIC PAIN OF RIGHT KNEE: ICD-10-CM

## 2022-07-18 DIAGNOSIS — I25.83 CORONARY ARTERY DISEASE DUE TO LIPID RICH PLAQUE: Chronic | ICD-10-CM

## 2022-07-18 DIAGNOSIS — N18.6 ESRD (END STAGE RENAL DISEASE) ON DIALYSIS (HCC): ICD-10-CM

## 2022-07-18 DIAGNOSIS — Z99.2 CONTROLLED TYPE 2 DIABETES MELLITUS WITH CHRONIC KIDNEY DISEASE ON CHRONIC DIALYSIS, WITHOUT LONG-TERM CURRENT USE OF INSULIN (HCC): ICD-10-CM

## 2022-07-18 DIAGNOSIS — I48.0 PAROXYSMAL A-FIB (HCC): ICD-10-CM

## 2022-07-18 DIAGNOSIS — E34.9 NEUROPATHY ASSOCIATED WITH ENDOCRINE DISORDER (HCC): ICD-10-CM

## 2022-07-18 DIAGNOSIS — D61.818 PANCYTOPENIA (HCC): ICD-10-CM

## 2022-07-18 DIAGNOSIS — E11.22 CONTROLLED TYPE 2 DIABETES MELLITUS WITH CHRONIC KIDNEY DISEASE ON CHRONIC DIALYSIS, WITHOUT LONG-TERM CURRENT USE OF INSULIN (HCC): ICD-10-CM

## 2022-07-18 DIAGNOSIS — I15.0 RENOVASCULAR HYPERTENSION: ICD-10-CM

## 2022-07-18 DIAGNOSIS — D68.69 SECONDARY HYPERCOAGULABLE STATE (HCC): ICD-10-CM

## 2022-07-18 PROCEDURE — G0439 PPPS, SUBSEQ VISIT: HCPCS | Performed by: NURSE PRACTITIONER

## 2022-07-18 RX ORDER — ROPINIROLE 1 MG/1
1 TABLET, FILM COATED ORAL 2 TIMES DAILY
Qty: 180 TABLET | Refills: 1 | Status: SHIPPED | OUTPATIENT
Start: 2022-07-18 | End: 2022-11-08

## 2022-07-18 ASSESSMENT — FIBROSIS 4 INDEX: FIB4 SCORE: 2.74

## 2022-07-18 ASSESSMENT — PATIENT HEALTH QUESTIONNAIRE - PHQ9: CLINICAL INTERPRETATION OF PHQ2 SCORE: 0

## 2022-07-18 ASSESSMENT — ACTIVITIES OF DAILY LIVING (ADL): BATHING_REQUIRES_ASSISTANCE: 0

## 2022-07-18 ASSESSMENT — ENCOUNTER SYMPTOMS: GENERAL WELL-BEING: FAIR

## 2022-07-18 NOTE — PROGRESS NOTES
No chief complaint on file.      HPI:  Tere Gallegos is a 71 y.o. here for Medicare Annual Wellness Visit     Patient Active Problem List    Diagnosis Date Noted   • Secondary hypercoagulable state ([facility]) 04/04/2022   • Acute diastolic CHF (congestive heart failure) ([facility]) 01/25/2022   • Acute pulmonary edema ([facility]) 01/25/2022   • Atypical chest pain 01/25/2022   • Anemia, chronic disease 01/25/2022   • Hospital discharge follow-up 08/18/2021   • Paroxysmal A-fib ([facility]) 08/11/2021   • Leukopenia 08/11/2021   • Chronic pain of right knee 07/08/2021   • Pain in the chest 06/16/2021   • Right leg pain 01/25/2021   • Acute hypoxemic respiratory failure due to COVID-19 ([facility]) 11/19/2020   • Pneumonia due to COVID-19 virus 11/18/2020   • Pancytopenia ([facility]) 11/18/2020   • Numbness and tingling in both hands 09/17/2020   • Anuria 09/16/2020   • Subclinical hypothyroidism 07/07/2020   • Long term (current) use of oral hypoglycemic drugs 07/07/2020   • Presence of drug-eluting stent in right coronary artery    • Chronic diastolic heart failure ([facility]) 05/04/2020   • ESRD (end stage renal disease) on dialysis ([facility]) 05/04/2020   • Acquired hypothyroidism 05/04/2020   • Dental disorder 05/04/2020   • Coronary artery disease due to lipid rich plaque    • QT prolongation 01/22/2020   • Mixed hyperlipidemia 11/12/2019   • RLS (restless legs syndrome) 08/05/2016   • Controlled type 2 diabetes mellitus with chronic kidney disease on chronic dialysis, without long-term current use of insulin ([facility]) 08/05/2016   • Kidney transplant candidate    • Uncontrolled hypertension 09/17/2013       Current Outpatient Medications   Medication Sig Dispense Refill   • ROPINIRole (REQUIP) 1 MG Tab TOME FERNANDO TABLETA DOS VECES AL DESMOND 180 Tablet 1   • lisinopril (PRINIVIL) 40 MG tablet TOME FERNANDO TABLETA TODOS LOS MCCORMICK 90 Tablet 3   • clopidogrel (PLAVIX) 75 MG Tab Take 75 mg by mouth every day.     • levothyroxine (SYNTHROID) 50 MCG Tab TOME FERNANDO  TABLETA POR VIA ORAL CADA MANANA ON AN EMPTY STOMACH 90 Tablet 3   • pioglitazone (ACTOS) 30 MG Tab TOME FERNANDO TABLETA TODOS LOS MCCORMICK 90 Tablet 3   • atorvastatin (LIPITOR) 40 MG Tab Take 1 Tablet by mouth every evening. 90 Tablet 3   • pregabalin (LYRICA) 25 MG Cap Take 25 mg by mouth 2 times a day. Pt reports that it makes her dizzy and drunk, pt sometimes takes this medication     • apixaban (ELIQUIS) 2.5mg Tab Take 1 Tablet by mouth 2 times a day. Indications: Thromboembolism secondary to Atrial Fibrillation 60 Tablet 2   • amLODIPine (NORVASC) 10 MG Tab TOME FERNANDO TABLETA TODOS LOS MCCORMICK (Patient taking differently: Take 10 mg by mouth every day.) 90 Tablet 3   • sevelamer carbonate (RENVELA) 800 MG Tab tablet Take 1 Tablet by mouth 3 times a day with meals. (Patient taking differently: Take 800-1,600 mg by mouth 3 times a day with meals. Pt reports she takes 1-2 tablets when she has a meal) 270 Tablet 3   • carvedilol (COREG) 25 MG Tab TOME FERNANDO TABLETA DOS VECES AL DESMOND CON LAS COMIDAS (Patient taking differently: Take 25 mg by mouth every day. Per pt only takes once a day, makes her feel sick) 180 tablet 3   • hydrALAZINE (APRESOLINE) 100 MG tablet Take 1 tablet by mouth 2 times a day. (Patient taking differently: Take 100 mg by mouth every day. Per pt only takes once a day, makes her feel sick) 180 tablet 3     No current facility-administered medications for this visit.          Current supplements as per medication list.     Allergies: Patient has no known allergies.    Current social contact/activities: time with family     She  reports that she has never smoked. She has never used smokeless tobacco. She reports that she does not drink alcohol and does not use drugs.  Counseling given: Not Answered      DPA/Advanced Directive:  Patient does not have an Advanced Directive.  A packet and workshop information was given on Advanced Directives.    ROS:    Gait: Uses no assistive device  Ostomy: No  Other tubes:  No  Amputations: No  Chronic oxygen use: No  Last eye exam: Unknown  Wears hearing aids: No   : Denies any urinary leakage during the last 6 months    Screening:    Depression Screening  Little interest or pleasure in doing things?  0 - not at all  Feeling down, depressed , or hopeless? 0 - not at all  Patient Health Questionnaire Score: 0     If depressive symptoms identified deferred to follow up visit unless specifically addressed in assessment and plan.    Interpretation of PHQ-9 Total Score   Score Severity   1-4 No Depression   5-9 Mild Depression   10-14 Moderate Depression   15-19 Moderately Severe Depression   20-27 Severe Depression    Screening for Cognitive Impairment  Three Minute Recall (daughter, heaven, mountain) 2/3    Mauro clock face with all 12 numbers and set the hands to show 10 past 11.  No    Cognitive concerns identified deferred for follow up unless specifically addressed in assessment and plan.    Fall Risk Assessment  Has the patient had two or more falls in the last year or any fall with injury in the last year?  No    Safety Assessment  Throw rugs on floor.  Yes  Handrails on all stairs.  Yes  Good lighting in all hallways.  Yes  Difficulty hearing.  Yes  Patient counseled about all safety risks that were identified.    Functional Assessment ADLs  Are there any barriers preventing you from cooking for yourself or meeting nutritional needs?  No.    Are there any barriers preventing you from driving safely or obtaining transportation?  No.    Are there any barriers preventing you from using a telephone or calling for help?  No.    Are there any barriers preventing you from shopping?  No.    Are there any barriers preventing you from taking care of your own finances?  No.    Are there any barriers preventing you from managing your medications?  No.    Are there any barriers preventing you from showering, bathing or dressing yourself?  No.    Are you currently engaging in any exercise or  physical activity?  Yes.  Patient walks 1 hour daily   What is your perception of your health?  Fair.      Health Maintenance Summary          Overdue - Annual Wellness Visit (Once) Overdue - never done    No completion history exists for this topic.          Overdue - IMM HEP B VACCINE (1 of 3 - Risk Recombivax 3-dose series) Overdue - never done    No completion history exists for this topic.          Overdue - IMM DTaP/Tdap/Td Vaccine (1 - Tdap) Overdue - never done    No completion history exists for this topic.          Overdue - IMM ZOSTER VACCINES (1 of 2) Overdue - never done    No completion history exists for this topic.          Overdue - BONE DENSITY (Every 5 Years) Overdue - never done    No completion history exists for this topic.          Ordered - FASTING LIPID PROFILE (Yearly) Ordered on 12/8/2021 09/29/2020  Lipid Profile    04/27/2020  Lipid Profile    12/23/2018  Lipid Profile (Lipid Panel) Fasting    10/04/2016  LIPID PANEL          Overdue - A1C SCREENING (Every 3 Months) Overdue since 3/8/2022    12/08/2021  POCT Hemoglobin A1C    08/03/2021  POCT Hemoglobin A1C    04/28/2021  HEMOGLOBIN A1C    01/28/2021  HEMOGLOBIN A1C    01/22/2021  POCT Hemoglobin A1C    Only the first 5 history entries have been loaded, but more history exists.          Overdue - COVID-19 Vaccine (4 - Booster for Moderna series) Overdue since 5/4/2022 01/04/2022  Imm Admin: PFIZER PURPLE CAP SARS-COV-2 VACCINATION (12+)    05/03/2021  Outside Immunization: Moderna Booster (50mcg) COVID-19 Vaccine INJ    04/05/2021  Outside Immunization: Moderna Booster (50mcg) COVID-19 Vaccine INJ          DIABETES MONOFILAMENT / LE EXAM (Yearly) Due soon on 8/4/2022 08/04/2021  SmartData: WORKFLOW - DIABETES - DIABETIC FOOT EXAM PERFORMED    01/22/2021  SmartData: WORKFLOW - DIABETES - DIABETIC FOOT EXAM PERFORMED    01/22/2021  Diabetic Monofilament LE Exam    10/07/2020  SmartData: WORKFLOW - DIABETES - DIABETIC FOOT EXAM  PERFORMED    10/07/2020  Diabetic Monofilament LE Exam    Only the first 5 history entries have been loaded, but more history exists.          IMM INFLUENZA (1) Next due on 9/1/2022 11/08/2018  Imm Admin: Influenza Vaccine Adult HD    10/16/2017  Imm Admin: Influenza Vaccine Adult HD          Ordered - SERUM CREATININE (Yearly) Ordered on 4/4/2022 01/26/2022  Basic Metabolic Panel (BMP)    01/24/2022  COMP METABOLIC PANEL    11/10/2021  Complete Metabolic Panel (CMP)    10/14/2021  Comp Metabolic Panel    10/04/2021  COMP METABOLIC PANEL    Only the first 5 history entries have been loaded, but more history exists.          PAP SMEAR (Every 3 Years) Next due on 5/11/2023 05/11/2020  THINPREP PAP WITH HPV    05/11/2020  Pathology Gynecology Specimen    09/23/2016  PATHOLOGY GYN SPECIMEN          RETINAL SCREENING (Yearly) Next due on 5/17/2023 05/17/2022  Referral for Retinal Screening Exam    06/22/2021  REFERRAL FOR RETINAL SCREENING EXAM    05/26/2020  REFERRAL FOR RETINAL SCREENING EXAM          MAMMOGRAM (Yearly) Tentatively due on 6/16/2023 06/16/2022  MA-SCREENING MAMMO BILAT W/TOMOSYNTHESIS W/CAD    08/24/2019  [id number]    07/20/2016  MA-SCREEN MAMMO W/CAD-BILAT          COLORECTAL CANCER SCREENING (COLONOSCOPY - Every 10 Years) Next due on 6/23/2026 06/23/2016  REFERRAL TO GI FOR COLONOSCOPY          IMM PNEUMOCOCCAL VACCINE: 65+ Years (Series Information) Completed    12/25/2018  Imm Admin: Pneumococcal polysaccharide vaccine (PPSV-23)    10/16/2017  Imm Admin: Pneumococcal Conjugate Vaccine (Prevnar/PCV-13)          HEPATITIS C SCREENING  Completed    01/25/2022  HEPATITIS PANEL ACUTE(4 COMPONENTS)    08/11/2021  HEPATITIS PANEL ACUTE(4 COMPONENTS)    01/22/2020  HEPATITIS PANEL ACUTE(4 COMPONENTS)    12/22/2018  HEPATITIS PANEL ACUTE(4 COMPONENTS)    11/01/2018  Outside Procedure: HEP C VIRUS ANTIBODY    Only the first 5 history entries have been loaded, but more history  exists.          IMM MENINGOCOCCAL VACCINE (MCV4) (Series Information) Aged Out    No completion history exists for this topic.          Discontinued - URINE ACR / MICROALBUMIN  Discontinued    09/29/2020  MICROALBUMIN CREAT RATIO URINE    04/29/2020  MICROALBUMIN CREAT RATIO URINE                Patient Care Team:  ANGELITA Concepcion as PCP - General (Family Medicine)  Fadi Najjar, M.D. as Consulting Physician (Nephrology)        Social History     Tobacco Use   • Smoking status: Never Smoker   • Smokeless tobacco: Never Used   Vaping Use   • Vaping Use: Never used   Substance Use Topics   • Alcohol use: No     Alcohol/week: 0.0 oz   • Drug use: No     Family History   Problem Relation Age of Onset   • Diabetes Sister    • Other Sister         liver disease   • Diabetes Brother    • Heart Disease Neg Hx      She  has a past medical history of Acquired hypothyroidism (5/4/2020), CAD (coronary artery disease), Chronic diastolic heart failure (HCC) (5/4/2020), Coronary artery disease due to lipid rich plaque, Dental disorder, Diabetes ([facility]), ESRD (end stage renal disease) on dialysis ([facility]) (5/4/2020), Hemodialysis patient ([facility]), Hyperlipidemia, Hypertension, Kidney transplant candidate, Presence of drug-eluting stent in right coronary artery, QT prolongation (1/22/2020), RLS (restless legs syndrome) (8/5/2016), and Transaminitis (12/22/2018).   Past Surgical History:   Procedure Laterality Date   • ZZZ CARDIAC CATH  9/7/2016    RCA stented with 2 Synergy drug-eluting stents.   • RECOVERY  8/16/2016    Procedure: CATH LAB [facility] WITH POSSIBLE DR. CASTILLO;  Surgeon: Recoveryonly Surgery;  Location: SURGERY PRE-POST PROC UNIT [facility] [place];  Service:    • ZZZ CARDIAC CATH  8/16/16    100% RCA   • OTHER Left 2014    left arm upper extremity fistula   • OTHER ABDOMINAL SURGERY      left kidney removed due to cancer       Exam:   /56 (BP Location: Right arm, Patient Position: Sitting, BP Cuff Size: Adult)   Pulse 71   " Temp 36.6 °C (97.9 °F) (Temporal)   Ht 1.626 m (5' 4\")   Wt 60.8 kg (134 lb)   SpO2 97%  Body mass index is 23 kg/m².    Hearing good.    Dentition fair  Alert, oriented in no acute distress.  Eye contact is good, speech goal directed, affect calm    Assessment and Plan. The following treatment and monitoring plan is recommended:      1. Medicare annual wellness visit, initial  Health Maintenance reviewed    2. Chronic diastolic heart failure (HCC)  Stable  Continue Medication and follow up per Cardiology    3. Paroxysmal A-fib (HCC)  Stable  Continue ASA 81 mg daily and eliquis 2.5 mg BID, refilled  Continue follow up with cardiology   - apixaban (ELIQUIS) 2.5mg Tab; Take 1 Tablet by mouth 2 times a day. Indications: Thromboembolism secondary to Atrial Fibrillation  Dispense: 180 Tablet; Refill: 3    4. Secondary hypercoagulable state ([facility])  Stable  On eliquis for PAfib    5. Controlled type 2 diabetes mellitus with chronic kidney disease on chronic dialysis, without long-term current use of insulin ([facility])  Stable  A1C controlled at 5.7  Continue medication and follow up per endocrinology    6. ESRD (end stage renal disease) on dialysis ([facility])  Stable on dialysis  On Transplant list with [facility] in   Continue medications and follow up per local nephrology and transplant specialist    7. Neuropathy associated with endocrine disorder (HCC)  Unstable  Patient continues to be quite confused about her medications for her neuropathy and RLS, we have spoken about her medications for this and her symptoms at every appointment, as well as with her family members on occasion. However today she reports that she is not taking any medication for her symptoms and that they are unstable. Per her chart review her nephrologist sent over a prescription for her ropinirole to the pharmacy a few weeks ago, she does not have that prescription with the rest of her medications today.  Spoke with son, gave him information about " medication and advised him to  today for her.     8. Pancytopenia (HCC)  Stable  Due to ESRD and other chronic disease    9. RLS (restless legs syndrome)  Unstable  Ropinirole resent to pharmacy, she will  today.   - ROPINIRole (REQUIP) 1 MG Tab; Take 1 Tablet by mouth 2 times a day.  Dispense: 180 Tablet; Refill: 1    10. Anemia, chronic disease  Stable, slowly decreasing   Due for repeat labs    11. Chronic pain of right knee  Stable  Tylenol as needed    12. Coronary artery disease due to lipid rich plaque  Stable  Continue ASA 81 mg daily  Off statin per endo due to severe leg pain/RLS to see if this improves    13. Presence of drug-eluting stent in right coronary artery  Stable  Continue ASA 81 mg daily  Off plavix per Transplant specialist (as per patient), cardiology staff messaged and notified of this.     14. Acquired hypothyroidism  Stable  Continue medication and follow up per endocrinology    15. Renovascular hypertension  Stable  Continue medication and follow up per cardiology and nephrology, not checking at home.         Services suggested: No services needed at this time  Health Care Screening: Age-appropriate preventive services recommended by USPTF and ACIP covered by Medicare were discussed today. Services ordered if indicated and agreed upon by the patient.  Referrals offered: Community-based lifestyle interventions to reduce health risks and promote self-management and wellness, fall prevention, nutrition, physical activity, tobacco-use cessation, weight loss, and mental health services as per orders if indicated.    Discussion today about general wellness and lifestyle habits:    · Prevent falls and reduce trip hazards; Cautioned about securing or removing rugs.  · Have a working fire alarm and carbon monoxide detector;   · Engage in regular physical activity and social activities     Follow-up: Return in about 4 weeks (around 8/15/2022) for RLS.

## 2022-07-19 ENCOUNTER — TELEPHONE (OUTPATIENT)
Dept: MEDICAL GROUP | Facility: MEDICAL CENTER | Age: 72
End: 2022-07-19
Payer: MEDICARE

## 2022-07-19 PROBLEM — R07.89 ATYPICAL CHEST PAIN: Status: RESOLVED | Noted: 2022-01-25 | Resolved: 2022-07-19

## 2022-07-19 PROBLEM — Z09 HOSPITAL DISCHARGE FOLLOW-UP: Status: RESOLVED | Noted: 2021-08-18 | Resolved: 2022-07-19

## 2022-07-19 PROBLEM — J96.01 ACUTE HYPOXEMIC RESPIRATORY FAILURE DUE TO COVID-19 (HCC): Status: RESOLVED | Noted: 2020-11-19 | Resolved: 2022-07-19

## 2022-07-19 PROBLEM — J12.82 PNEUMONIA DUE TO COVID-19 VIRUS: Status: RESOLVED | Noted: 2020-11-18 | Resolved: 2022-07-19

## 2022-07-19 PROBLEM — J81.0 ACUTE PULMONARY EDEMA (HCC): Status: RESOLVED | Noted: 2022-01-25 | Resolved: 2022-07-19

## 2022-07-19 PROBLEM — U07.1 PNEUMONIA DUE TO COVID-19 VIRUS: Status: RESOLVED | Noted: 2020-11-18 | Resolved: 2022-07-19

## 2022-07-19 PROBLEM — M79.604 RIGHT LEG PAIN: Status: RESOLVED | Noted: 2021-01-25 | Resolved: 2022-07-19

## 2022-07-19 PROBLEM — U07.1 ACUTE HYPOXEMIC RESPIRATORY FAILURE DUE TO COVID-19 (HCC): Status: RESOLVED | Noted: 2020-11-19 | Resolved: 2022-07-19

## 2022-07-19 NOTE — PROGRESS NOTES
No chief complaint on file.      HPI:  Tere Gallegos is a 71 y.o. here for Medicare Annual Wellness Visit     Patient Active Problem List    Diagnosis Date Noted   • Secondary hypercoagulable state ([facility]) 04/04/2022   • Acute diastolic CHF (congestive heart failure) ([facility]) 01/25/2022   • Anemia, chronic disease 01/25/2022   • Paroxysmal A-fib ([facility]) 08/11/2021   • Leukopenia 08/11/2021   • Chronic pain of right knee 07/08/2021   • Pain in the chest 06/16/2021   • Pancytopenia ([facility]) 11/18/2020   • Numbness and tingling in both hands 09/17/2020   • Anuria 09/16/2020   • Subclinical hypothyroidism 07/07/2020   • Long term (current) use of oral hypoglycemic drugs 07/07/2020   • Presence of drug-eluting stent in right coronary artery    • Chronic diastolic heart failure ([facility]) 05/04/2020   • ESRD (end stage renal disease) on dialysis ([facility]) 05/04/2020   • Acquired hypothyroidism 05/04/2020   • Dental disorder 05/04/2020   • Coronary artery disease due to lipid rich plaque    • QT prolongation 01/22/2020   • Mixed hyperlipidemia 11/12/2019   • RLS (restless legs syndrome) 08/05/2016   • Controlled type 2 diabetes mellitus with chronic kidney disease on chronic dialysis, without long-term current use of insulin ([facility]) 08/05/2016   • Kidney transplant candidate    • Renovascular hypertension 09/17/2013       Current Outpatient Medications   Medication Sig Dispense Refill   • apixaban (ELIQUIS) 2.5mg Tab Take 1 Tablet by mouth 2 times a day. Indications: Thromboembolism secondary to Atrial Fibrillation 180 Tablet 3   • ROPINIRole (REQUIP) 1 MG Tab Take 1 Tablet by mouth 2 times a day. 180 Tablet 1   • lisinopril (PRINIVIL) 40 MG tablet TOME FERNANDO TABLETA TODOS LOS MCCORMICK 90 Tablet 3   • clopidogrel (PLAVIX) 75 MG Tab Take 75 mg by mouth every day.     • levothyroxine (SYNTHROID) 50 MCG Tab TOME FERNANDO TABLETA POR VIA ORAL CADA MANANA ON AN EMPTY STOMACH 90 Tablet 3   • pioglitazone (ACTOS) 30 MG Tab TOME FERNANDO TABLETA TODOS LOS  MCCORMICK 90 Tablet 3   • atorvastatin (LIPITOR) 40 MG Tab Take 1 Tablet by mouth every evening. 90 Tablet 3   • pregabalin (LYRICA) 25 MG Cap Take 25 mg by mouth 2 times a day. Pt reports that it makes her dizzy and drunk, pt sometimes takes this medication     • amLODIPine (NORVASC) 10 MG Tab TOME FERNADNO TABLETA TODOS LOS MCCORMICK (Patient taking differently: Take 10 mg by mouth every day.) 90 Tablet 3   • sevelamer carbonate (RENVELA) 800 MG Tab tablet Take 1 Tablet by mouth 3 times a day with meals. (Patient taking differently: Take 800-1,600 mg by mouth 3 times a day with meals. Pt reports she takes 1-2 tablets when she has a meal) 270 Tablet 3   • carvedilol (COREG) 25 MG Tab TOME FERNANDO TABLETA DOS VECES AL DESMOND CON LAS COMIDAS (Patient taking differently: Take 25 mg by mouth every day. Per pt only takes once a day, makes her feel sick) 180 tablet 3   • hydrALAZINE (APRESOLINE) 100 MG tablet Take 1 tablet by mouth 2 times a day. (Patient taking differently: Take 100 mg by mouth every day. Per pt only takes once a day, makes her feel sick) 180 tablet 3     No current facility-administered medications for this visit.          Current supplements as per medication list.     Allergies: Patient has no known allergies.    Current social contact/activities: time with family     She  reports that she has never smoked. She has never used smokeless tobacco. She reports that she does not drink alcohol and does not use drugs.  Counseling given: Not Answered      DPA/Advanced Directive:  Patient does not have an Advanced Directive.  A packet and workshop information was given on Advanced Directives.    ROS:    Gait: Uses no assistive device  Ostomy: No  Other tubes: No  Amputations: No  Chronic oxygen use: No  Last eye exam: Unknown  Wears hearing aids: No   : Denies any urinary leakage during the last 6 months    Screening:    Depression Screening  Little interest or pleasure in doing things?  0 - not at all  Feeling down, depressed ,  or hopeless? 0 - not at all  Patient Health Questionnaire Score: 0     If depressive symptoms identified deferred to follow up visit unless specifically addressed in assessment and plan.    Interpretation of PHQ-9 Total Score   Score Severity   1-4 No Depression   5-9 Mild Depression   10-14 Moderate Depression   15-19 Moderately Severe Depression   20-27 Severe Depression    Screening for Cognitive Impairment  Three Minute Recall (daughter, heaven, mountain) 2/3    Mauro clock face with all 12 numbers and set the hands to show 10 past 11.  No    Cognitive concerns identified deferred for follow up unless specifically addressed in assessment and plan.    Fall Risk Assessment  Has the patient had two or more falls in the last year or any fall with injury in the last year?  No    Safety Assessment  Throw rugs on floor.  Yes  Handrails on all stairs.  Yes  Good lighting in all hallways.  Yes  Difficulty hearing.  Yes  Patient counseled about all safety risks that were identified.    Functional Assessment ADLs  Are there any barriers preventing you from cooking for yourself or meeting nutritional needs?  No.    Are there any barriers preventing you from driving safely or obtaining transportation?  No.    Are there any barriers preventing you from using a telephone or calling for help?  No.    Are there any barriers preventing you from shopping?  No.    Are there any barriers preventing you from taking care of your own finances?  No.    Are there any barriers preventing you from managing your medications?  No.    Are there any barriers preventing you from showering, bathing or dressing yourself?  No.    Are you currently engaging in any exercise or physical activity?  Yes.  Patient walks 1 hour daily   What is your perception of your health?  Fair.      Health Maintenance Summary          Overdue - Annual Wellness Visit (Once) Overdue - never done    No completion history exists for this topic.          Overdue - IMM HEP  B VACCINE (1 of 3 - Risk Recombivax 3-dose series) Overdue - never done    No completion history exists for this topic.          Overdue - IMM DTaP/Tdap/Td Vaccine (1 - Tdap) Overdue - never done    No completion history exists for this topic.          Overdue - IMM ZOSTER VACCINES (1 of 2) Overdue - never done    No completion history exists for this topic.          Overdue - BONE DENSITY (Every 5 Years) Overdue - never done    No completion history exists for this topic.          Ordered - FASTING LIPID PROFILE (Yearly) Ordered on 12/8/2021 09/29/2020  Lipid Profile    04/27/2020  Lipid Profile    12/23/2018  Lipid Profile (Lipid Panel) Fasting    10/04/2016  LIPID PANEL          Overdue - A1C SCREENING (Every 3 Months) Overdue since 3/8/2022    12/08/2021  POCT Hemoglobin A1C    08/03/2021  POCT Hemoglobin A1C    04/28/2021  HEMOGLOBIN A1C    01/28/2021  HEMOGLOBIN A1C    01/22/2021  POCT Hemoglobin A1C    Only the first 5 history entries have been loaded, but more history exists.          Overdue - COVID-19 Vaccine (4 - Booster for Moderna series) Overdue since 5/4/2022 01/04/2022  Imm Admin: PFIZER PURPLE CAP SARS-COV-2 VACCINATION (12+)    05/03/2021  Outside Immunization: Moderna Booster (50mcg) COVID-19 Vaccine INJ    04/05/2021  Outside Immunization: Moderna Booster (50mcg) COVID-19 Vaccine INJ          DIABETES MONOFILAMENT / LE EXAM (Yearly) Due soon on 8/4/2022 08/04/2021  SmartData: WORKFLOW - DIABETES - DIABETIC FOOT EXAM PERFORMED    01/22/2021  SmartData: WORKFLOW - DIABETES - DIABETIC FOOT EXAM PERFORMED    01/22/2021  Diabetic Monofilament LE Exam    10/07/2020  SmartData: WORKFLOW - DIABETES - DIABETIC FOOT EXAM PERFORMED    10/07/2020  Diabetic Monofilament LE Exam    Only the first 5 history entries have been loaded, but more history exists.          IMM INFLUENZA (1) Next due on 9/1/2022 11/08/2018  Imm Admin: Influenza Vaccine Adult HD    10/16/2017  Imm Admin: Influenza Vaccine  Adult HD          Ordered - SERUM CREATININE (Yearly) Ordered on 4/4/2022 01/26/2022  Basic Metabolic Panel (BMP)    01/24/2022  COMP METABOLIC PANEL    11/10/2021  Complete Metabolic Panel (CMP)    10/14/2021  Comp Metabolic Panel    10/04/2021  COMP METABOLIC PANEL    Only the first 5 history entries have been loaded, but more history exists.          PAP SMEAR (Every 3 Years) Next due on 5/11/2023 05/11/2020  THINPREP PAP WITH HPV    05/11/2020  Pathology Gynecology Specimen    09/23/2016  PATHOLOGY GYN SPECIMEN          RETINAL SCREENING (Yearly) Next due on 5/17/2023 05/17/2022  Referral for Retinal Screening Exam    06/22/2021  REFERRAL FOR RETINAL SCREENING EXAM    05/26/2020  REFERRAL FOR RETINAL SCREENING EXAM          MAMMOGRAM (Yearly) Tentatively due on 6/16/2023 06/16/2022  MA-SCREENING MAMMO BILAT W/TOMOSYNTHESIS W/CAD    08/24/2019  BP-[id number]-[id number]    07/20/2016  MA-SCREEN MAMMO W/CAD-BILAT          COLORECTAL CANCER SCREENING (COLONOSCOPY - Every 10 Years) Next due on 6/23/2026 06/23/2016  REFERRAL TO GI FOR COLONOSCOPY          IMM PNEUMOCOCCAL VACCINE: 65+ Years (Series Information) Completed    12/25/2018  Imm Admin: Pneumococcal polysaccharide vaccine (PPSV-23)    10/16/2017  Imm Admin: Pneumococcal Conjugate Vaccine (Prevnar/PCV-13)          HEPATITIS C SCREENING  Completed    01/25/2022  HEPATITIS PANEL ACUTE(4 COMPONENTS)    08/11/2021  HEPATITIS PANEL ACUTE(4 COMPONENTS)    01/22/2020  HEPATITIS PANEL ACUTE(4 COMPONENTS)    12/22/2018  HEPATITIS PANEL ACUTE(4 COMPONENTS)    11/01/2018  Outside Procedure: HEP C VIRUS ANTIBODY    Only the first 5 history entries have been loaded, but more history exists.          IMM MENINGOCOCCAL VACCINE (MCV4) (Series Information) Aged Out    No completion history exists for this topic.          Discontinued - URINE ACR / MICROALBUMIN  Discontinued    09/29/2020  MICROALBUMIN CREAT RATIO URINE    04/29/2020  MICROALBUMIN CREAT RATIO  "URINE                Patient Care Team:  ANGELITA Concepcion as PCP - General (Family Medicine)  Fadi Najjar, M.D. as Consulting Physician (Nephrology)        Social History     Tobacco Use   • Smoking status: Never Smoker   • Smokeless tobacco: Never Used   Vaping Use   • Vaping Use: Never used   Substance Use Topics   • Alcohol use: No     Alcohol/week: 0.0 oz   • Drug use: No     Family History   Problem Relation Age of Onset   • Diabetes Sister    • Other Sister         liver disease   • Diabetes Brother    • Heart Disease Neg Hx      She  has a past medical history of Acquired hypothyroidism (5/4/2020), CAD (coronary artery disease), Chronic diastolic heart failure (HCC) (5/4/2020), Coronary artery disease due to lipid rich plaque, Dental disorder, Diabetes (HCC), ESRD (end stage renal disease) on dialysis (HCC) (5/4/2020), Hemodialysis patient ([facility]), Hyperlipidemia, Hypertension, Kidney transplant candidate, Presence of drug-eluting stent in right coronary artery, QT prolongation (1/22/2020), RLS (restless legs syndrome) (8/5/2016), and Transaminitis (12/22/2018).   Past Surgical History:   Procedure Laterality Date   • ZZZ CARDIAC CATH  9/7/2016    RCA stented with 2 Synergy drug-eluting stents.   • RECOVERY  8/16/2016    Procedure: CATH LAB [facility] WITH POSSIBLE DR. CASTILLO;  Surgeon: Recoveryonly Surgery;  Location: SURGERY PRE-POST PROC UNIT [facility];  Service:    • ZZZ CARDIAC CATH  8/16/16    100% RCA   • OTHER Left 2014    left arm upper extremity fistula   • OTHER ABDOMINAL SURGERY      left kidney removed due to cancer       Exam:   /56 (BP Location: Right arm, Patient Position: Sitting, BP Cuff Size: Adult)   Pulse 71   Temp 36.6 °C (97.9 °F) (Temporal)   Ht 1.626 m (5' 4\")   Wt 60.8 kg (134 lb)   SpO2 97%  Body mass index is 23 kg/m².    Hearing good.    Dentition fair  Alert, oriented in no acute distress.  Eye contact is good, speech goal directed, affect calm    Assessment and Plan. " The following treatment and monitoring plan is recommended:      1. Medicare annual wellness visit, initial  Health Maintenance reviewed    2. Chronic diastolic heart failure (HCC)  Stable  Continue Medication and follow up per Cardiology    3. Paroxysmal A-fib (HCC)  Stable  Continue ASA 81 mg daily and eliquis 2.5 mg BID, refilled  Continue follow up with cardiology   - apixaban (ELIQUIS) 2.5mg Tab; Take 1 Tablet by mouth 2 times a day. Indications: Thromboembolism secondary to Atrial Fibrillation  Dispense: 180 Tablet; Refill: 3    4. Secondary hypercoagulable state (HCC)  Stable  On eliquis for PAfib    5. Controlled type 2 diabetes mellitus with chronic kidney disease on chronic dialysis, without long-term current use of insulin (HCC)  Stable  A1C controlled at 5.7  Continue medication and follow up per endocrinology    6. ESRD (end stage renal disease) on dialysis (HCC)  Stable on dialysis  On Transplant list with [facility] in   Continue medications and follow up per local nephrology and transplant specialist    7. Neuropathy associated with endocrine disorder (HCC)  Unstable  Patient continues to be quite confused about her medications for her neuropathy and RLS, we have spoken about her medications for this and her symptoms at every appointment, as well as with her family members on occasion. However today she reports that she is not taking any medication for her symptoms and that they are unstable. Per her chart review her nephrologist sent over a prescription for her ropinirole to the pharmacy a few weeks ago, she does not have that prescription with the rest of her medications today.  Spoke with son, gave him information about medication and advised him to  today for her.     8. Pancytopenia (HCC)  Stable  Due to ESRD and other chronic disease    9. RLS (restless legs syndrome)  Unstable  Ropinirole resent to pharmacy, she will  today.   - ROPINIRole (REQUIP) 1 MG Tab; Take 1 Tablet by mouth  2 times a day.  Dispense: 180 Tablet; Refill: 1    10. Anemia, chronic disease  Stable, slowly decreasing   Due for repeat labs    11. Chronic pain of right knee  Stable  Tylenol as needed    12. Coronary artery disease due to lipid rich plaque  Stable  Continue ASA 81 mg daily  Off statin per endo due to severe leg pain/RLS to see if this improves    13. Presence of drug-eluting stent in right coronary artery  Stable  Continue ASA 81 mg daily  Off plavix per Transplant specialist (as per patient), cardiology staff messaged and notified of this.     14. Acquired hypothyroidism  Stable  Continue medication and follow up per endocrinology    15. Renovascular hypertension  Stable  Continue medication and follow up per cardiology and nephrology, not checking at home.         Services suggested: No services needed at this time  Health Care Screening: Age-appropriate preventive services recommended by USPTF and ACIP covered by Medicare were discussed today. Services ordered if indicated and agreed upon by the patient.  Referrals offered: Community-based lifestyle interventions to reduce health risks and promote self-management and wellness, fall prevention, nutrition, physical activity, tobacco-use cessation, weight loss, and mental health services as per orders if indicated.    Discussion today about general wellness and lifestyle habits:    · Prevent falls and reduce trip hazards; Cautioned about securing or removing rugs.  · Have a working fire alarm and carbon monoxide detector;   · Engage in regular physical activity and social activities     Follow-up: Return in about 4 weeks (around 8/15/2022) for RLS.

## 2022-07-19 NOTE — TELEPHONE ENCOUNTER
----- Message from Milton Pettit M.D. sent at 7/18/2022  4:03 PM PDT -----  Fine to do aspirin and eliquis. Thanks!  ----- Message -----  From: ANGELITA Concepcion  Sent: 7/18/2022   3:06 PM PDT  To: Milton Pettit M.D.    Hi Tere Serrano is confused about which medications she is supposed to be taking. She told me today she has run out of her Eliquis, I have sent in refills. She gave me a sticky note, she doesn't recall who wrote it, saying that her kidney transplant doctor wants her to stop her Plavix and continue ASA 81 mg daily. However in your note you have her on Plavix, but not ASA, is that correct?    Thank you for your time!  ANGELITA Concepcion

## 2022-07-19 NOTE — TELEPHONE ENCOUNTER
**[place] SPEAKING ONLY**    Please call and notify patient AND one of her children that her heart doctor would like her to continue taking her Aspirin 81 mg daily and Eliquis which I sent in refills for. She is to STOP her Plavix which I believe she already has, per the request of her Kidney Transplant specialist in [place] per patient report.     MALGORZATA Concepcion.

## 2022-07-22 ENCOUNTER — TELEPHONE (OUTPATIENT)
Dept: MEDICAL GROUP | Facility: MEDICAL CENTER | Age: 72
End: 2022-07-22
Payer: MEDICARE

## 2022-07-22 DIAGNOSIS — E03.8 SUBCLINICAL HYPOTHYROIDISM: ICD-10-CM

## 2022-07-22 DIAGNOSIS — E11.22 CONTROLLED TYPE 2 DIABETES MELLITUS WITH CHRONIC KIDNEY DISEASE ON CHRONIC DIALYSIS, WITHOUT LONG-TERM CURRENT USE OF INSULIN (HCC): ICD-10-CM

## 2022-07-22 DIAGNOSIS — N18.6 CONTROLLED TYPE 2 DIABETES MELLITUS WITH CHRONIC KIDNEY DISEASE ON CHRONIC DIALYSIS, WITHOUT LONG-TERM CURRENT USE OF INSULIN (HCC): ICD-10-CM

## 2022-07-22 DIAGNOSIS — Z99.2 CONTROLLED TYPE 2 DIABETES MELLITUS WITH CHRONIC KIDNEY DISEASE ON CHRONIC DIALYSIS, WITHOUT LONG-TERM CURRENT USE OF INSULIN (HCC): ICD-10-CM

## 2022-07-22 NOTE — TELEPHONE ENCOUNTER
Patient came in with her son Manoj and was told by Endocrinology to have a new referral be sent in order to see Dr. Ly. Please Let son know when this is done  -Thanks    no

## 2022-08-03 DIAGNOSIS — Z79.01 CHRONIC ANTICOAGULATION: ICD-10-CM

## 2022-08-05 ENCOUNTER — APPOINTMENT (OUTPATIENT)
Dept: ENDOCRINOLOGY | Facility: MEDICAL CENTER | Age: 72
End: 2022-08-05
Attending: INTERNAL MEDICINE

## 2022-08-24 ENCOUNTER — APPOINTMENT (OUTPATIENT)
Dept: CARDIOLOGY | Facility: MEDICAL CENTER | Age: 72
DRG: 308 | End: 2022-08-24
Attending: INTERNAL MEDICINE
Payer: MEDICARE

## 2022-08-24 ENCOUNTER — HOSPITAL ENCOUNTER (INPATIENT)
Facility: MEDICAL CENTER | Age: 72
LOS: 1 days | DRG: 308 | End: 2022-08-25
Attending: EMERGENCY MEDICINE | Admitting: INTERNAL MEDICINE
Payer: MEDICARE

## 2022-08-24 ENCOUNTER — APPOINTMENT (OUTPATIENT)
Dept: RADIOLOGY | Facility: MEDICAL CENTER | Age: 72
DRG: 308 | End: 2022-08-24
Attending: EMERGENCY MEDICINE
Payer: MEDICARE

## 2022-08-24 DIAGNOSIS — I48.0 PAROXYSMAL A-FIB (HCC): ICD-10-CM

## 2022-08-24 DIAGNOSIS — I48.91 ATRIAL FIBRILLATION, UNSPECIFIED TYPE (HCC): ICD-10-CM

## 2022-08-24 DIAGNOSIS — I48.91 ATRIAL FIBRILLATION WITH RAPID VENTRICULAR RESPONSE (HCC): ICD-10-CM

## 2022-08-24 PROBLEM — R74.8 CARDIAC ENZYMES ELEVATED: Status: ACTIVE | Noted: 2022-08-24

## 2022-08-24 LAB
ALBUMIN SERPL BCP-MCNC: 4.4 G/DL (ref 3.2–4.9)
ALBUMIN/GLOB SERPL: 1.6 G/DL
ALP SERPL-CCNC: 68 U/L (ref 30–99)
ALT SERPL-CCNC: 16 U/L (ref 2–50)
ANION GAP SERPL CALC-SCNC: 16 MMOL/L (ref 7–16)
AST SERPL-CCNC: 17 U/L (ref 12–45)
BASOPHILS # BLD AUTO: 0.6 % (ref 0–1.8)
BASOPHILS # BLD: 0.02 K/UL (ref 0–0.12)
BILIRUB SERPL-MCNC: 0.4 MG/DL (ref 0.1–1.5)
BUN SERPL-MCNC: 14 MG/DL (ref 8–22)
CALCIUM SERPL-MCNC: 9.8 MG/DL (ref 8.4–10.2)
CHLORIDE SERPL-SCNC: 93 MMOL/L (ref 96–112)
CO2 SERPL-SCNC: 25 MMOL/L (ref 20–33)
CREAT SERPL-MCNC: 3.72 MG/DL (ref 0.5–1.4)
D DIMER PPP IA.FEU-MCNC: 0.81 UG/ML (FEU) (ref 0–0.5)
EKG IMPRESSION: NORMAL
EOSINOPHIL # BLD AUTO: 0.02 K/UL (ref 0–0.51)
EOSINOPHIL NFR BLD: 0.6 % (ref 0–6.9)
ERYTHROCYTE [DISTWIDTH] IN BLOOD BY AUTOMATED COUNT: 50 FL (ref 35.9–50)
GFR SERPLBLD CREATININE-BSD FMLA CKD-EPI: 12 ML/MIN/1.73 M 2
GLOBULIN SER CALC-MCNC: 2.8 G/DL (ref 1.9–3.5)
GLUCOSE BLD STRIP.AUTO-MCNC: 144 MG/DL (ref 65–99)
GLUCOSE SERPL-MCNC: 148 MG/DL (ref 65–99)
HCT VFR BLD AUTO: 39 % (ref 37–47)
HGB BLD-MCNC: 12.8 G/DL (ref 12–16)
IMM GRANULOCYTES # BLD AUTO: 0.01 K/UL (ref 0–0.11)
IMM GRANULOCYTES NFR BLD AUTO: 0.3 % (ref 0–0.9)
LV EJECT FRACT  99904: 65
LV EJECT FRACT MOD 2C 99903: 58.54
LV EJECT FRACT MOD 4C 99902: 60.39
LV EJECT FRACT MOD BP 99901: 59.11
LYMPHOCYTES # BLD AUTO: 0.57 K/UL (ref 1–4.8)
LYMPHOCYTES NFR BLD: 18 % (ref 22–41)
MCH RBC QN AUTO: 31.4 PG (ref 27–33)
MCHC RBC AUTO-ENTMCNC: 32.8 G/DL (ref 33.6–35)
MCV RBC AUTO: 95.6 FL (ref 81.4–97.8)
MONOCYTES # BLD AUTO: 0.37 K/UL (ref 0–0.85)
MONOCYTES NFR BLD AUTO: 11.7 % (ref 0–13.4)
NEUTROPHILS # BLD AUTO: 2.17 K/UL (ref 2–7.15)
NEUTROPHILS NFR BLD: 68.8 % (ref 44–72)
NRBC # BLD AUTO: 0 K/UL
NRBC BLD-RTO: 0 /100 WBC
NT-PROBNP SERPL IA-MCNC: 9762 PG/ML (ref 0–125)
PLATELET # BLD AUTO: 143 K/UL (ref 164–446)
PMV BLD AUTO: 10 FL (ref 9–12.9)
POTASSIUM SERPL-SCNC: 3.5 MMOL/L (ref 3.6–5.5)
PROT SERPL-MCNC: 7.2 G/DL (ref 6–8.2)
RBC # BLD AUTO: 4.08 M/UL (ref 4.2–5.4)
SODIUM SERPL-SCNC: 134 MMOL/L (ref 135–145)
T4 FREE SERPL-MCNC: 2.36 NG/DL (ref 0.93–1.7)
TROPONIN T SERPL-MCNC: 58 NG/L (ref 6–19)
TSH SERPL DL<=0.005 MIU/L-ACNC: 2.6 UIU/ML (ref 0.38–5.33)
WBC # BLD AUTO: 3.2 K/UL (ref 4.8–10.8)

## 2022-08-24 PROCEDURE — 93306 TTE W/DOPPLER COMPLETE: CPT

## 2022-08-24 PROCEDURE — 82962 GLUCOSE BLOOD TEST: CPT

## 2022-08-24 PROCEDURE — 85379 FIBRIN DEGRADATION QUANT: CPT

## 2022-08-24 PROCEDURE — 93005 ELECTROCARDIOGRAM TRACING: CPT | Performed by: INTERNAL MEDICINE

## 2022-08-24 PROCEDURE — 770020 HCHG ROOM/CARE - TELE (206)

## 2022-08-24 PROCEDURE — 84439 ASSAY OF FREE THYROXINE: CPT

## 2022-08-24 PROCEDURE — 84443 ASSAY THYROID STIM HORMONE: CPT

## 2022-08-24 PROCEDURE — 700105 HCHG RX REV CODE 258: Performed by: EMERGENCY MEDICINE

## 2022-08-24 PROCEDURE — 700102 HCHG RX REV CODE 250 W/ 637 OVERRIDE(OP): Performed by: INTERNAL MEDICINE

## 2022-08-24 PROCEDURE — 700111 HCHG RX REV CODE 636 W/ 250 OVERRIDE (IP): Performed by: EMERGENCY MEDICINE

## 2022-08-24 PROCEDURE — 71045 X-RAY EXAM CHEST 1 VIEW: CPT

## 2022-08-24 PROCEDURE — 84484 ASSAY OF TROPONIN QUANT: CPT

## 2022-08-24 PROCEDURE — 93005 ELECTROCARDIOGRAM TRACING: CPT | Performed by: EMERGENCY MEDICINE

## 2022-08-24 PROCEDURE — 83880 ASSAY OF NATRIURETIC PEPTIDE: CPT

## 2022-08-24 PROCEDURE — 99291 CRITICAL CARE FIRST HOUR: CPT | Performed by: INTERNAL MEDICINE

## 2022-08-24 PROCEDURE — 96366 THER/PROPH/DIAG IV INF ADDON: CPT

## 2022-08-24 PROCEDURE — 85025 COMPLETE CBC W/AUTO DIFF WBC: CPT

## 2022-08-24 PROCEDURE — 93306 TTE W/DOPPLER COMPLETE: CPT | Mod: 26 | Performed by: INTERNAL MEDICINE

## 2022-08-24 PROCEDURE — 99291 CRITICAL CARE FIRST HOUR: CPT

## 2022-08-24 PROCEDURE — A9270 NON-COVERED ITEM OR SERVICE: HCPCS | Performed by: INTERNAL MEDICINE

## 2022-08-24 PROCEDURE — 96375 TX/PRO/DX INJ NEW DRUG ADDON: CPT

## 2022-08-24 PROCEDURE — 96365 THER/PROPH/DIAG IV INF INIT: CPT

## 2022-08-24 PROCEDURE — 80053 COMPREHEN METABOLIC PANEL: CPT

## 2022-08-24 PROCEDURE — 36415 COLL VENOUS BLD VENIPUNCTURE: CPT

## 2022-08-24 RX ORDER — MORPHINE SULFATE 4 MG/ML
4 INJECTION INTRAVENOUS ONCE
Status: COMPLETED | OUTPATIENT
Start: 2022-08-24 | End: 2022-08-24

## 2022-08-24 RX ORDER — LORAZEPAM 2 MG/ML
0.5 INJECTION INTRAMUSCULAR ONCE
Status: COMPLETED | OUTPATIENT
Start: 2022-08-24 | End: 2022-08-24

## 2022-08-24 RX ORDER — ONDANSETRON 2 MG/ML
4 INJECTION INTRAMUSCULAR; INTRAVENOUS ONCE
Status: COMPLETED | OUTPATIENT
Start: 2022-08-24 | End: 2022-08-24

## 2022-08-24 RX ORDER — CARVEDILOL 3.12 MG/1
3.12 TABLET ORAL 2 TIMES DAILY WITH MEALS
Status: DISCONTINUED | OUTPATIENT
Start: 2022-08-24 | End: 2022-08-25 | Stop reason: HOSPADM

## 2022-08-24 RX ORDER — AMOXICILLIN 250 MG
2 CAPSULE ORAL 2 TIMES DAILY
Status: DISCONTINUED | OUTPATIENT
Start: 2022-08-24 | End: 2022-08-25 | Stop reason: HOSPADM

## 2022-08-24 RX ORDER — BISACODYL 10 MG
10 SUPPOSITORY, RECTAL RECTAL
Status: DISCONTINUED | OUTPATIENT
Start: 2022-08-24 | End: 2022-08-25 | Stop reason: HOSPADM

## 2022-08-24 RX ORDER — CARVEDILOL 6.25 MG/1
6.25 TABLET ORAL 2 TIMES DAILY WITH MEALS
Status: DISCONTINUED | OUTPATIENT
Start: 2022-08-24 | End: 2022-08-24

## 2022-08-24 RX ORDER — ROPINIROLE 1 MG/1
1 TABLET, FILM COATED ORAL 2 TIMES DAILY
Status: DISCONTINUED | OUTPATIENT
Start: 2022-08-24 | End: 2022-08-25 | Stop reason: HOSPADM

## 2022-08-24 RX ORDER — ACETAMINOPHEN 500 MG
500 TABLET ORAL EVERY 6 HOURS PRN
Status: DISCONTINUED | OUTPATIENT
Start: 2022-08-24 | End: 2022-08-25 | Stop reason: HOSPADM

## 2022-08-24 RX ORDER — LISINOPRIL 20 MG/1
40 TABLET ORAL EVERY EVENING
Status: DISCONTINUED | OUTPATIENT
Start: 2022-08-24 | End: 2022-08-25 | Stop reason: HOSPADM

## 2022-08-24 RX ORDER — POLYETHYLENE GLYCOL 3350 17 G/17G
1 POWDER, FOR SOLUTION ORAL
Status: DISCONTINUED | OUTPATIENT
Start: 2022-08-24 | End: 2022-08-25 | Stop reason: HOSPADM

## 2022-08-24 RX ORDER — IBUPROFEN 400 MG/1
400 TABLET ORAL EVERY 6 HOURS PRN
Status: DISCONTINUED | OUTPATIENT
Start: 2022-08-24 | End: 2022-08-25 | Stop reason: HOSPADM

## 2022-08-24 RX ORDER — DILTIAZEM HYDROCHLORIDE 5 MG/ML
0.25 INJECTION INTRAVENOUS ONCE
Status: COMPLETED | OUTPATIENT
Start: 2022-08-24 | End: 2022-08-24

## 2022-08-24 RX ADMIN — SENNOSIDES AND DOCUSATE SODIUM 2 TABLET: 50; 8.6 TABLET ORAL at 17:34

## 2022-08-24 RX ADMIN — APIXABAN 2.5 MG: 2.5 TABLET, FILM COATED ORAL at 17:34

## 2022-08-24 RX ADMIN — DILTIAZEM HYDROCHLORIDE 5 MG/HR: 5 INJECTION, SOLUTION INTRAVENOUS at 12:32

## 2022-08-24 RX ADMIN — MORPHINE SULFATE 4 MG: 4 INJECTION INTRAVENOUS at 13:39

## 2022-08-24 RX ADMIN — DILTIAZEM HYDROCHLORIDE 15.55 MG: 5 INJECTION INTRAVENOUS at 11:50

## 2022-08-24 RX ADMIN — DILTIAZEM HYDROCHLORIDE 15 MG/HR: 5 INJECTION, SOLUTION INTRAVENOUS at 13:24

## 2022-08-24 RX ADMIN — LORAZEPAM 0.5 MG: 2 INJECTION INTRAMUSCULAR; INTRAVENOUS at 12:33

## 2022-08-24 RX ADMIN — ROPINIROLE HYDROCHLORIDE 1 MG: 1 TABLET, FILM COATED ORAL at 17:34

## 2022-08-24 RX ADMIN — LISINOPRIL 40 MG: 20 TABLET ORAL at 17:34

## 2022-08-24 RX ADMIN — DILTIAZEM HYDROCHLORIDE 5 MG/HR: 5 INJECTION, SOLUTION INTRAVENOUS at 14:35

## 2022-08-24 RX ADMIN — ONDANSETRON 4 MG: 2 INJECTION INTRAMUSCULAR; INTRAVENOUS at 13:39

## 2022-08-24 RX ADMIN — DILTIAZEM HYDROCHLORIDE 10 MG/HR: 5 INJECTION, SOLUTION INTRAVENOUS at 14:20

## 2022-08-24 ASSESSMENT — CHA2DS2 SCORE
VASCULAR DISEASE: YES
HYPERTENSION: YES
AGE 75 OR GREATER: NO
DIABETES: NO
CHF OR LEFT VENTRICULAR DYSFUNCTION: NO
CHA2DS2 VASC SCORE: 4
AGE 65 TO 74: YES
PRIOR STROKE OR TIA OR THROMBOEMBOLISM: NO
SEX: FEMALE

## 2022-08-24 ASSESSMENT — PAIN DESCRIPTION - PAIN TYPE
TYPE: ACUTE PAIN

## 2022-08-24 ASSESSMENT — ENCOUNTER SYMPTOMS
SHORTNESS OF BREATH: 1
PALPITATIONS: 1
ORTHOPNEA: 0
CLAUDICATION: 0

## 2022-08-24 ASSESSMENT — COGNITIVE AND FUNCTIONAL STATUS - GENERAL
DAILY ACTIVITIY SCORE: 24
SUGGESTED CMS G CODE MODIFIER DAILY ACTIVITY: CH
SUGGESTED CMS G CODE MODIFIER MOBILITY: CH
MOBILITY SCORE: 24

## 2022-08-24 ASSESSMENT — LIFESTYLE VARIABLES
TOTAL SCORE: 0
HAVE PEOPLE ANNOYED YOU BY CRITICIZING YOUR DRINKING: NO
CONSUMPTION TOTAL: NEGATIVE
AVERAGE NUMBER OF DAYS PER WEEK YOU HAVE A DRINK CONTAINING ALCOHOL: 0
ALCOHOL_USE: NO
ON A TYPICAL DAY WHEN YOU DRINK ALCOHOL HOW MANY DRINKS DO YOU HAVE: 0
HAVE YOU EVER FELT YOU SHOULD CUT DOWN ON YOUR DRINKING: NO
HOW MANY TIMES IN THE PAST YEAR HAVE YOU HAD 5 OR MORE DRINKS IN A DAY: 0
EVER FELT BAD OR GUILTY ABOUT YOUR DRINKING: NO
TOTAL SCORE: 0
TOTAL SCORE: 0
EVER HAD A DRINK FIRST THING IN THE MORNING TO STEADY YOUR NERVES TO GET RID OF A HANGOVER: NO

## 2022-08-24 ASSESSMENT — FIBROSIS 4 INDEX
FIB4 SCORE: 2.54
FIB4 SCORE: 2.14

## 2022-08-24 ASSESSMENT — PATIENT HEALTH QUESTIONNAIRE - PHQ9
1. LITTLE INTEREST OR PLEASURE IN DOING THINGS: NOT AT ALL
SUM OF ALL RESPONSES TO PHQ9 QUESTIONS 1 AND 2: 0
2. FEELING DOWN, DEPRESSED, IRRITABLE, OR HOPELESS: NOT AT ALL

## 2022-08-24 NOTE — ED PROVIDER NOTES
"ED Provider Note    CHIEF COMPLAINT  Chief Complaint   Patient presents with    Chest Pain       HPI  Tere Kenyetta Gallegos is a 71 y.o. female here for evaluation of fast heart rate and chest pain.  Patient states that she has history of the same, but denies any \"heart attack.\"  She states that the chest pain stays in the left side, and does not radiate.  She has no fever chills or vomiting, and no treatment prior to arrival.  She is a dialysis patient, and is compliant.  She is here with her son who is translating.  Patient has no known history of atrial fibrillation, and she denies any blood thinner use.      ROS  See HPI for further details, o/w negative.     PAST MEDICAL HISTORY   has a past medical history of Acquired hypothyroidism (5/4/2020), CAD (coronary artery disease), Chronic diastolic heart failure (HCC) (5/4/2020), Coronary artery disease due to lipid rich plaque, Dental disorder, Diabetes ([facility]), ESRD (end stage renal disease) on dialysis ([facility]) (5/4/2020), Hemodialysis patient ([facility]), Hyperlipidemia, Hypertension, Kidney transplant candidate, Presence of drug-eluting stent in right coronary artery, QT prolongation (1/22/2020), RLS (restless legs syndrome) (8/5/2016), and Transaminitis (12/22/2018).    SOCIAL HISTORY  Social History     Tobacco Use    Smoking status: Never    Smokeless tobacco: Never   Vaping Use    Vaping Use: Never used   Substance and Sexual Activity    Alcohol use: No     Alcohol/week: 0.0 oz    Drug use: No    Sexual activity: Never       Family History  No bleeding disorders noted    SURGICAL HISTORY   has a past surgical history that includes recovery (8/16/2016); other abdominal surgery; other (Left, 2014); zzz cardiac cath (8/16/16); and zzz cardiac cath (9/7/2016).    CURRENT MEDICATIONS  Home Medications    **Home medications have not yet been reviewed for this encounter**         ALLERGIES  No Known Allergies    REVIEW OF SYSTEMS  See HPI for further details. Review of " systems as above, otherwise all other systems are negative.     PHYSICAL EXAM  Constitutional: Well developed, well nourished.  Mild acute distress.  HEENT: Normocephalic, atraumatic. Posterior pharynx clear and moist.  Eyes:  EOMI. Normal sclera.  Neck: Supple, Full range of motion, nontender.  Chest/Pulmonary: clear to ausculation. Symmetrical expansion.   Cardio: Irregularly irregular rate and rhythm with no murmur.   Abdomen: Soft, nontender. No peritoneal signs. No guarding. No palpable masses.  Musculoskeletal: No deformity, no edema, neurovascular intact.   Neuro: Clear speech, appropriate, cooperative, cranial nerves II-XII grossly intact.  Psych: Normal mood and affect    PROCEDURES     MEDICAL RECORD  I have reviewed patient's medical record and pertinent results are listed.    COURSE & MEDICAL DECISION MAKING  I have reviewed any medical record information, laboratory studies and radiographic results as noted above.    Results for orders placed or performed during the hospital encounter of 08/24/22   CBC with Differential   Result Value Ref Range    WBC 3.2 (L) 4.8 - 10.8 K/uL    RBC 4.08 (L) 4.20 - 5.40 M/uL    Hemoglobin 12.8 12.0 - 16.0 g/dL    Hematocrit 39.0 37.0 - 47.0 %    MCV 95.6 81.4 - 97.8 fL    MCH 31.4 27.0 - 33.0 pg    MCHC 32.8 (L) 33.6 - 35.0 g/dL    RDW 50.0 35.9 - 50.0 fL    Platelet Count 143 (L) 164 - 446 K/uL    MPV 10.0 9.0 - 12.9 fL    Neutrophils-Polys 68.80 44.00 - 72.00 %    Lymphocytes 18.00 (L) 22.00 - 41.00 %    Monocytes 11.70 0.00 - 13.40 %    Eosinophils 0.60 0.00 - 6.90 %    Basophils 0.60 0.00 - 1.80 %    Immature Granulocytes 0.30 0.00 - 0.90 %    Nucleated RBC 0.00 /100 WBC    Neutrophils (Absolute) 2.17 2.00 - 7.15 K/uL    Lymphs (Absolute) 0.57 (L) 1.00 - 4.80 K/uL    Monos (Absolute) 0.37 0.00 - 0.85 K/uL    Eos (Absolute) 0.02 0.00 - 0.51 K/uL    Baso (Absolute) 0.02 0.00 - 0.12 K/uL    Immature Granulocytes (abs) 0.01 0.00 - 0.11 K/uL    NRBC (Absolute) 0.00 K/uL    Complete Metabolic Panel (CMP)   Result Value Ref Range    Sodium 134 (L) 135 - 145 mmol/L    Potassium 3.5 (L) 3.6 - 5.5 mmol/L    Chloride 93 (L) 96 - 112 mmol/L    Co2 25 20 - 33 mmol/L    Anion Gap 16.0 7.0 - 16.0    Glucose 148 (H) 65 - 99 mg/dL    Bun 14 8 - 22 mg/dL    Creatinine 3.72 (H) 0.50 - 1.40 mg/dL    Calcium 9.8 8.4 - 10.2 mg/dL    AST(SGOT) 17 12 - 45 U/L    ALT(SGPT) 16 2 - 50 U/L    Alkaline Phosphatase 68 30 - 99 U/L    Total Bilirubin 0.4 0.1 - 1.5 mg/dL    Albumin 4.4 3.2 - 4.9 g/dL    Total Protein 7.2 6.0 - 8.2 g/dL    Globulin 2.8 1.9 - 3.5 g/dL    A-G Ratio 1.6 g/dL   Troponin   Result Value Ref Range    Troponin T 58 (H) 6 - 19 ng/L   ESTIMATED GFR   Result Value Ref Range    GFR (CKD-EPI) 12 (A) >60 mL/min/1.73 m 2     DX-CHEST-PORTABLE (1 VIEW)   Final Result      1.  Stable cardiomegaly      2.  Mild interstitial prominence is suggestive of edema.        The pt presented with a heart rate in the 150s, and has been improving after the Cardizem bolus and drip.    CRITICAL CARE  The very real possibility of a deterioration of this patient's condition required the highest level of my preparedness for sudden, emergent intervention.  I provided critical care services, which included medication orders, frequent reevaluations of the patient's condition and response to treatment, ordering and reviewing test results, and discussing the case with various consultants.  The critical care time associated with the care of the patient was 45 minutes. Review chart for interventions. This time is exclusive of any other billable procedures.         FINAL IMPRESSION  Atrial fibrillation with rvr  Chest pain   Critical care time 45 minutes.         Electronically signed by: Orville Tamayo D.O., 8/24/2022 11:47 AM

## 2022-08-24 NOTE — PROGRESS NOTES
4 Eyes Skin Assessment Completed by TRAVIS Alcocer and TRAVIS Norris.    Head WDL  Ears WDL  Nose WDL  Mouth WDL  Neck WDL  Breast/Chest WDL  Shoulder Blades WDL  Spine WDL  (R) Arm/Elbow/Hand WDL  (L) Arm/Elbow/Hand WDL AV Fistula present in RADHA  Abdomen WDL  Groin WDL  Scrotum/Coccyx/Buttocks WDL  (R) Leg Abrasion small raised bump present  (L) Leg WDL  (R) Heel/Foot/Toe WDL  (L) Heel/Foot/Toe WDL          Devices In Places ECG, Blood Pressure Cuff, and Pulse Ox      Interventions In Place Pillows    Possible Skin Injury No    Pictures Uploaded Into Epic N/A  Wound Consult Placed N/A  RN Wound Prevention Protocol Ordered No

## 2022-08-24 NOTE — ED NOTES
Pt still anxious, c/o of restless legs, Pt unable to sit still in gurney - keeps sliding down, ERP notified;

## 2022-08-24 NOTE — ED NOTES
Med rec updated and complete, per [facility] @ [phone]  Allergies reviewed, per pt  Interviewed pt with son at bedside with permission from pt.  Used  service spoke to [place] [id number], pt was not sure what medications she is taking, but thinks she took her medications yesterday @ 1200.  Called [facility] @ [phone] to verify all medications.  Per pharmacy pt last filled AMLODIPINE 10MG and ATORVASTATIN 40MG 2/2022 for 90 day supply, CARVEDILOL 25MG on 10/2021 for 90 day supply, HYDRALAZINE 100MG on 11/2022 for 90 day supply, LEVOTHYROXINE 50MCG on 3/2022 for 90 day supply, SEVELAMER CARBONATE 800MG 1 tablet TID last picked up on 6/13/2021 for 90 day supply.

## 2022-08-25 VITALS
HEIGHT: 58 IN | WEIGHT: 138.67 LBS | BODY MASS INDEX: 29.11 KG/M2 | RESPIRATION RATE: 18 BRPM | TEMPERATURE: 98.2 F | DIASTOLIC BLOOD PRESSURE: 53 MMHG | HEART RATE: 63 BPM | SYSTOLIC BLOOD PRESSURE: 150 MMHG | OXYGEN SATURATION: 95 %

## 2022-08-25 PROBLEM — D64.9 NORMOCYTIC ANEMIA: Status: ACTIVE | Noted: 2022-08-25

## 2022-08-25 PROBLEM — E87.1 HYPONATREMIA: Status: ACTIVE | Noted: 2022-08-25

## 2022-08-25 PROBLEM — D70.9 NEUTROPENIA (HCC): Status: ACTIVE | Noted: 2022-08-25

## 2022-08-25 PROBLEM — I48.91 ATRIAL FIBRILLATION WITH RAPID VENTRICULAR RESPONSE (HCC): Status: RESOLVED | Noted: 2022-08-24 | Resolved: 2022-08-25

## 2022-08-25 PROBLEM — D69.6 THROMBOCYTOPENIA (HCC): Status: ACTIVE | Noted: 2022-08-25

## 2022-08-25 LAB
ALBUMIN SERPL BCP-MCNC: 3.7 G/DL (ref 3.2–4.9)
ALBUMIN/GLOB SERPL: 1.6 G/DL
ALP SERPL-CCNC: 53 U/L (ref 30–99)
ALT SERPL-CCNC: 12 U/L (ref 2–50)
ANION GAP SERPL CALC-SCNC: 10 MMOL/L (ref 7–16)
AST SERPL-CCNC: 14 U/L (ref 12–45)
BASOPHILS # BLD AUTO: 0.4 % (ref 0–1.8)
BASOPHILS # BLD: 0.01 K/UL (ref 0–0.12)
BILIRUB SERPL-MCNC: 0.3 MG/DL (ref 0.1–1.5)
BUN SERPL-MCNC: 27 MG/DL (ref 8–22)
CALCIUM SERPL-MCNC: 8.9 MG/DL (ref 8.4–10.2)
CHLORIDE SERPL-SCNC: 94 MMOL/L (ref 96–112)
CO2 SERPL-SCNC: 28 MMOL/L (ref 20–33)
CREAT SERPL-MCNC: 5.94 MG/DL (ref 0.5–1.4)
EKG IMPRESSION: NORMAL
EOSINOPHIL # BLD AUTO: 0.04 K/UL (ref 0–0.51)
EOSINOPHIL NFR BLD: 1.5 % (ref 0–6.9)
ERYTHROCYTE [DISTWIDTH] IN BLOOD BY AUTOMATED COUNT: 50.8 FL (ref 35.9–50)
GFR SERPLBLD CREATININE-BSD FMLA CKD-EPI: 7 ML/MIN/1.73 M 2
GLOBULIN SER CALC-MCNC: 2.3 G/DL (ref 1.9–3.5)
GLUCOSE BLD STRIP.AUTO-MCNC: 107 MG/DL (ref 65–99)
GLUCOSE BLD STRIP.AUTO-MCNC: 91 MG/DL (ref 65–99)
GLUCOSE SERPL-MCNC: 80 MG/DL (ref 65–99)
HCT VFR BLD AUTO: 33.4 % (ref 37–47)
HGB BLD-MCNC: 10.7 G/DL (ref 12–16)
IMM GRANULOCYTES # BLD AUTO: 0.01 K/UL (ref 0–0.11)
IMM GRANULOCYTES NFR BLD AUTO: 0.4 % (ref 0–0.9)
LYMPHOCYTES # BLD AUTO: 0.62 K/UL (ref 1–4.8)
LYMPHOCYTES NFR BLD: 23.7 % (ref 22–41)
MAGNESIUM SERPL-MCNC: 2.4 MG/DL (ref 1.5–2.5)
MCH RBC QN AUTO: 31.1 PG (ref 27–33)
MCHC RBC AUTO-ENTMCNC: 32 G/DL (ref 33.6–35)
MCV RBC AUTO: 97.1 FL (ref 81.4–97.8)
MONOCYTES # BLD AUTO: 0.29 K/UL (ref 0–0.85)
MONOCYTES NFR BLD AUTO: 11.1 % (ref 0–13.4)
NEUTROPHILS # BLD AUTO: 1.65 K/UL (ref 2–7.15)
NEUTROPHILS NFR BLD: 62.9 % (ref 44–72)
NRBC # BLD AUTO: 0 K/UL
NRBC BLD-RTO: 0 /100 WBC
PHOSPHATE SERPL-MCNC: 5 MG/DL (ref 2.5–4.5)
PLATELET # BLD AUTO: 120 K/UL (ref 164–446)
PMV BLD AUTO: 10.5 FL (ref 9–12.9)
POTASSIUM SERPL-SCNC: 4.9 MMOL/L (ref 3.6–5.5)
PROT SERPL-MCNC: 6 G/DL (ref 6–8.2)
RBC # BLD AUTO: 3.44 M/UL (ref 4.2–5.4)
SODIUM SERPL-SCNC: 132 MMOL/L (ref 135–145)
WBC # BLD AUTO: 2.6 K/UL (ref 4.8–10.8)

## 2022-08-25 PROCEDURE — 99239 HOSP IP/OBS DSCHRG MGMT >30: CPT | Performed by: STUDENT IN AN ORGANIZED HEALTH CARE EDUCATION/TRAINING PROGRAM

## 2022-08-25 PROCEDURE — 36415 COLL VENOUS BLD VENIPUNCTURE: CPT

## 2022-08-25 PROCEDURE — 84100 ASSAY OF PHOSPHORUS: CPT

## 2022-08-25 PROCEDURE — 700102 HCHG RX REV CODE 250 W/ 637 OVERRIDE(OP): Performed by: STUDENT IN AN ORGANIZED HEALTH CARE EDUCATION/TRAINING PROGRAM

## 2022-08-25 PROCEDURE — 85025 COMPLETE CBC W/AUTO DIFF WBC: CPT

## 2022-08-25 PROCEDURE — 94760 N-INVAS EAR/PLS OXIMETRY 1: CPT

## 2022-08-25 PROCEDURE — 700102 HCHG RX REV CODE 250 W/ 637 OVERRIDE(OP): Performed by: INTERNAL MEDICINE

## 2022-08-25 PROCEDURE — 82962 GLUCOSE BLOOD TEST: CPT

## 2022-08-25 PROCEDURE — 80053 COMPREHEN METABOLIC PANEL: CPT

## 2022-08-25 PROCEDURE — A9270 NON-COVERED ITEM OR SERVICE: HCPCS | Performed by: INTERNAL MEDICINE

## 2022-08-25 PROCEDURE — 83735 ASSAY OF MAGNESIUM: CPT

## 2022-08-25 PROCEDURE — A9270 NON-COVERED ITEM OR SERVICE: HCPCS | Performed by: STUDENT IN AN ORGANIZED HEALTH CARE EDUCATION/TRAINING PROGRAM

## 2022-08-25 PROCEDURE — 93010 ELECTROCARDIOGRAM REPORT: CPT | Performed by: INTERNAL MEDICINE

## 2022-08-25 RX ORDER — CARVEDILOL 3.12 MG/1
3.12 TABLET ORAL 2 TIMES DAILY WITH MEALS
Qty: 60 TABLET | Refills: 0 | Status: ON HOLD | OUTPATIENT
Start: 2022-08-25 | End: 2022-10-19

## 2022-08-25 RX ORDER — POTASSIUM CHLORIDE 20 MEQ/1
40 TABLET, EXTENDED RELEASE ORAL EVERY 6 HOURS
Status: COMPLETED | OUTPATIENT
Start: 2022-08-25 | End: 2022-08-25

## 2022-08-25 RX ADMIN — CARVEDILOL 3.12 MG: 3.12 TABLET, FILM COATED ORAL at 07:43

## 2022-08-25 RX ADMIN — POTASSIUM CHLORIDE 40 MEQ: 1500 TABLET, EXTENDED RELEASE ORAL at 11:14

## 2022-08-25 RX ADMIN — IBUPROFEN 400 MG: 400 TABLET, FILM COATED ORAL at 09:10

## 2022-08-25 RX ADMIN — APIXABAN 2.5 MG: 2.5 TABLET, FILM COATED ORAL at 05:25

## 2022-08-25 RX ADMIN — SENNOSIDES AND DOCUSATE SODIUM 2 TABLET: 50; 8.6 TABLET ORAL at 05:25

## 2022-08-25 RX ADMIN — POTASSIUM CHLORIDE 40 MEQ: 1500 TABLET, EXTENDED RELEASE ORAL at 07:44

## 2022-08-25 RX ADMIN — ROPINIROLE HYDROCHLORIDE 1 MG: 1 TABLET, FILM COATED ORAL at 05:25

## 2022-08-25 ASSESSMENT — COGNITIVE AND FUNCTIONAL STATUS - GENERAL
CLIMB 3 TO 5 STEPS WITH RAILING: A LITTLE
SUGGESTED CMS G CODE MODIFIER MOBILITY: CI
SUGGESTED CMS G CODE MODIFIER DAILY ACTIVITY: CH
MOBILITY SCORE: 23
DAILY ACTIVITIY SCORE: 24

## 2022-08-25 ASSESSMENT — PAIN DESCRIPTION - PAIN TYPE: TYPE: ACUTE PAIN

## 2022-08-25 ASSESSMENT — CHA2DS2 SCORE
HYPERTENSION: YES
CHA2DS2 VASC SCORE: 6
DIABETES: YES
AGE 75 OR GREATER: NO
PRIOR STROKE OR TIA OR THROMBOEMBOLISM: NO
AGE 65 TO 74: YES
VASCULAR DISEASE: YES
CHF OR LEFT VENTRICULAR DYSFUNCTION: YES
SEX: FEMALE

## 2022-08-25 NOTE — PROGRESS NOTES
Pt transferred to tele via . Pt updated with use of  on translation tablet. Granddaughter had been updated at bedside that had transfer orders and would be moving out to tele unit during shift.

## 2022-08-25 NOTE — DISCHARGE SUMMARY
Discharge Summary    CHIEF COMPLAINT ON ADMISSION  Chief Complaint   Patient presents with    Chest Pain       Reason for Admission  Atrial fibrillation with rapid ventricular response pain    Admission Date  8/24/2022    CODE STATUS  Full Code    HPI & HOSPITAL COURSE  Tere Gallegos is a 71-year-old woman who presented on 8/24/2022 with complaints of chest pain.  This is a pleasant [place] Islander-speaking woman with a history of end-stage renal disease due to diabetes mellitus type 2 on dialysis Wednesdays, Mondays, Wednesdays and Fridays, restless leg syndrome, atrial fibrillation on apixaban, coronary artery disease status post drug-eluting stents.  According to the patient, she was undergoing her scheduled dialysis session in the morning of her presentation, when she developed chest discomfort.  She reported having an similar episode during past dialysis sessions when wrong needles were used.  Patient continued to have chest pain following her dialysis session prompting her to come to the emergency room for further evaluation, where she was found to be in atrial fibrillation with rapid ventricular response.  Patient reported history of atrial fibrillation, for which she was prescribed carvedilol but has not been taking.  She was compliant with her anticoagulation regimen with apixaban.  Patient's last hemoglobin A1c was noted to be 5.7.  Patient was followed by cardiology for chronic diastolic heart failure.  Patient was on the transplant list at [facility] in [place].    Patient continued to remain hemodynamically stable but continues to have heart rates in the 150s.  She was given diltiazem pushes with minimal response and then started on a diltiazem drip for rate control.  She was admitted to the intensive care unit to be continued on a diltiazem drip titration.  Patient was started on carvedilol with excellent control of her heart rate.  Patient was discharged as per her request.      Therefore,  she is discharged in good and stable condition to home with close outpatient follow-up.    The patient recovered much more quickly than anticipated on admission.     services were used in the patient's primary language of [place].     Name or Number: [place] [id number]  Mode of interpretation: iPad    Content of Interpretation:  Interval update, physical exam, and discussion regarding discharge planning.       Discharge Date  8/25/2022    FOLLOW UP ITEMS POST DISCHARGE  -Follow-up with PCP in 3 to 5 days.  -Follow-up with cardiologist.    DISCHARGE DIAGNOSES  Principal Problem (Resolved):    Atrial fibrillation with rapid ventricular response (HCC) POA: Yes  Active Problems:    RLS (restless legs syndrome) POA: Yes    Controlled type 2 diabetes mellitus with chronic kidney disease on chronic dialysis, without long-term current use of insulin (HCC) POA: Yes    Coronary artery disease involving native heart without angina pectoris POA: Yes      Overview: 2 Synergy NOEMI to 100% RCA stent placed    ESRD (end stage renal disease) on dialysis ([facility]) POA: Yes    Presence of drug-eluting stent in right coronary artery (Chronic) POA: Yes    Chronic pain of right knee POA: Yes    Cardiac enzymes elevated POA: Yes    Hyponatremia POA: Yes    Normocytic anemia POA: Yes    Neutropenia (HCC) POA: Yes    Thrombocytopenia (HCC) POA: Yes      FOLLOW UP  Future Appointments   Date Time Provider Department Center   9/20/2022  4:00 PM LIZETT FisherP.RCARLENE [facility] None   10/4/2022 10:00 AM [facility] EXAM 4 [facility] None     LIZETT ConcepcionP.RCARLENE  [address] [facility] [address]  [facility] [address]  [phone]    Follow up in 3 day(s)      MEDICATIONS ON DISCHARGE     Medication List        Start taking these medications        Instructions   carvedilol 3.125 MG Tabs  Commonly known as: COREG   Take 1 Tablet by mouth 2 times a day with meals.  Dose: 3.125 mg            Continue taking these medications        Instructions    apixaban 2.5mg Tabs  Commonly known as: ELIQUIS   Take 1 Tablet by mouth 2 times a day. Indications: Thromboembolism secondary to Atrial Fibrillation  Dose: 2.5 mg     ROPINIRole 1 MG Tabs  Commonly known as: REQUIP   Take 1 Tablet by mouth 2 times a day.  Dose: 1 mg            Ask your doctor about these medications        Instructions   lisinopril 40 MG tablet  Commonly known as: PRINIVIL   TOME FERNANDO TABLETA TODOS LOS MCCORMICK     pioglitazone 30 MG Tabs  Commonly known as: ACTOS   TOME FERNANDO TABLETA TODOS LOS MCCORMICK              Allergies  No Known Allergies    DIET  Orders Placed This Encounter   Procedures    Diet Order Diet: Renal; Second Modifier: (optional): Consistent CHO (Diabetic)     Standing Status:   Standing     Number of Occurrences:   1     Order Specific Question:   Diet:     Answer:   Renal [8]     Order Specific Question:   Second Modifier: (optional)     Answer:   Consistent CHO (Diabetic) [4]       ACTIVITY  As tolerated.  Weight bearing as tolerated    CONSULTATIONS  Critical care    PROCEDURES  None    LABORATORY  Lab Results   Component Value Date    SODIUM 132 (L) 08/25/2022    POTASSIUM 4.9 08/25/2022    CHLORIDE 94 (L) 08/25/2022    CO2 28 08/25/2022    GLUCOSE 80 08/25/2022    BUN 27 (H) 08/25/2022    CREATININE 5.94 (HH) 08/25/2022        Lab Results   Component Value Date    WBC 2.6 (L) 08/25/2022    HEMOGLOBIN 10.7 (L) 08/25/2022    HEMATOCRIT 33.4 (L) 08/25/2022    PLATELETCT 120 (L) 08/25/2022        Total time of the discharge process exceeds 34 minutes.

## 2022-08-25 NOTE — DISCHARGE PLANNING
Case Management Discharge Planning    Admission Date: 8/24/2022  GMLOS: 3.5  ALOS: 1    6-Clicks ADL Score: 24  6-Clicks Mobility Score: 23      Anticipated Discharge Dispo:  Home    DME Needed: No    Action(s) Taken: LSW completed chart review. Discussed pt in morning rounds. Per MD, pt is cleared to d/c today. Pt has a 6-clicks score of 24/23. No d/c needs reported or identified at this time.     0915: Per RN, pt does not wear oxygen at home.     1050: Discussed pt in IDT rounds. Per MD, clear to d/c today. No d/c reported at this time.    1136: Per chart review, pt still on 2L oxygen. LSW messaged RN and MD to see if pt is discharging on oxygen. Per RN, pt discharged on room air.     Escalations Completed: None    Medically Clear: Yes    Next Steps: LSW to follow and assist as needed.    Barriers to Discharge: None    Is the patient up for discharge tomorrow: No, today

## 2022-08-25 NOTE — CARE PLAN
The patient is Stable - Low risk of patient condition declining or worsening    Shift Goals  Clinical Goals: Monitor heart rhythm  Patient Goals: Sleep    Progress made toward(s) clinical / shift goals:    Problem: Pain - Standard  Goal: Alleviation of pain or a reduction in pain to the patient’s comfort goal  Outcome: Progressing     Problem: Knowledge Deficit - Standard  Goal: Patient and family/care givers will demonstrate understanding of plan of care, disease process/condition, diagnostic tests and medications  Outcome: Progressing     Problem: Hemodynamics  Goal: Patient's hemodynamics, fluid balance and neurologic status will be stable or improve  Outcome: Progressing     Problem: Fluid Volume  Goal: Fluid volume balance will be maintained  Outcome: Progressing     Problem: Urinary - Renal Perfusion  Goal: Ability to achieve and maintain adequate renal perfusion and functioning will improve  Outcome: Progressing     Problem: Respiratory  Goal: Patient will achieve/maintain optimum respiratory ventilation and gas exchange  Outcome: Progressing       Patient is not progressing towards the following goals:

## 2022-08-25 NOTE — ASSESSMENT & PLAN NOTE
Due to DM2  Dialysis [facility]  Nephrologist Dr Villar  currently on the transplant list at [facility] in [place] for kidney transplant

## 2022-08-25 NOTE — ASSESSMENT & PLAN NOTE
History of pAF  RVR stimulated by pain, ? Med noncompliance as supposed to be on MTP however not taking  -- Cont dilt gtt, titrate to HR < 110  -- Cont eliquis  -- Restart carvedilol 6.25 (previously had been on 25) with holding parameters

## 2022-08-25 NOTE — CARE PLAN
The patient is Stable - Low risk of patient condition declining or worsening    Shift Goals  Clinical Goals: Monitor heart rhythm  Patient Goals: Comfort    Progress made toward(s) clinical / shift goals:    Problem: Fluid Volume  Goal: Fluid volume balance will be maintained  Outcome: Progressing  Note: Dialysis pt.     Problem: Hemodynamics  Goal: Patient's hemodynamics, fluid balance and neurologic status will be stable or improve  Note: Pt is in asymptomatic sinus bradycardia. No edema present. Neurological status is WNL.       Patient is not progressing towards the following goals:

## 2022-08-25 NOTE — CONSULTS
"Critical Care Consultation    Date of consult: 8/24/2022    Referring Physician  Orville Tamayo D.O.    Reason for Consultation  Atrial fibrillation with RVR    History of Presenting Illness   services were used in the patient's primary language of Niuean.     Name or Number: LUIS Cooley  Mode of interpretation: Inhouse     Content of Interpretation:  All aspects    71 y.o. female who presented 8/24/2022 with complaints of chest pain.  She has a history of ESRD due to DM 2, dialysis MWF.  She was at her routinely scheduled dialysis session this morning when she developed chest discomfort.  She has had this happen in the past during dialysis sessions when they \"use the wrong needles\" per the patient.  Chest pain persisted following her dialysis session and came to the emergency room.  She was found to be in atrial fibrillation with RVR.  She has a history of atrial fibrillation, for which she has been prescribed carvedilol in the past however currently not taking.  She has been compliant with her anticoagulation with apixaban.      In the emergency room she was hemodynamically stable but remained in A. fib RVR with rates in the 150s.  She was given Cardizem pushes and eventually started on a drip to achieve rate control and transferred to the ICU for titratable cardiac drip..    She also has a history of chronic diastolic heart failure followed by cardiology, 2 diabetes A1c 5.7%, currently on the transplant list at [facility] in [place] for kidney transplant, restless leg syndrome.    Code Status  Full Code    Review of Systems  Review of Systems   Respiratory:  Positive for shortness of breath.    Cardiovascular:  Positive for chest pain and palpitations. Negative for orthopnea and claudication.   Musculoskeletal:  Positive for joint pain (right knee).   Neurological:         Restless legs   All other systems reviewed and are negative.    Past Medical History   has a past medical " history of Acquired hypothyroidism (5/4/2020), CAD (coronary artery disease), Chronic diastolic heart failure (HCC) (5/4/2020), Coronary artery disease due to lipid rich plaque, Dental disorder, Diabetes (HCC), ESRD (end stage renal disease) on dialysis (HCC) (5/4/2020), Hemodialysis patient ([facility]), Hyperlipidemia, Hypertension, Kidney transplant candidate, Presence of drug-eluting stent in right coronary artery, QT prolongation (1/22/2020), RLS (restless legs syndrome) (8/5/2016), and Transaminitis (12/22/2018).    Surgical History   has a past surgical history that includes recovery (8/16/2016); other abdominal surgery; other (Left, 2014); zzz cardiac cath (8/16/16); and zzz cardiac cath (9/7/2016).    Family History  family history includes Diabetes in her brother and sister; Other in her sister.    Social History   reports that she has never smoked. She has never used smokeless tobacco. She reports that she does not drink alcohol and does not use drugs.    Medications  Home Medications       Reviewed by Veronica Parra (Pharmacy Tech) on 08/24/22 at 1218  Med List Status: Complete     Medication Last Dose Status   apixaban (ELIQUIS) 2.5mg Tab 8/23/2022 Active   lisinopril (PRINIVIL) 40 MG tablet 8/23/2022 Active   pioglitazone (ACTOS) 30 MG Tab 8/23/2022 Active   ROPINIRole (REQUIP) 1 MG Tab 8/23/2022 Active                  Current Facility-Administered Medications   Medication Dose Route Frequency Provider Last Rate Last Admin    DILTIAZem (CARDIZEM) 100 mg in dextrose 5% 100 mL Infusion  0-20 mg/hr Intravenous Continuous Orville Tamayo D.O.   Stopped at 08/24/22 1600    apixaban (ELIQUIS) tablet 2.5 mg  2.5 mg Oral BID Abhijeet Cotter M.D.   2.5 mg at 08/24/22 1734    lisinopril (PRINIVIL) tablet 40 mg  40 mg Oral Q EVENING Abhijeet Cotter M.D.   40 mg at 08/24/22 1734    ROPINIRole (REQUIP) tablet 1 mg  1 mg Oral BID Abhijeet Cotter M.D.   1 mg at 08/24/22 1734    senna-docusate (PERICOLACE or  SENOKOT S) 8.6-50 MG per tablet 2 Tablet  2 Tablet Oral BID Abhijeet Cotter M.D.   2 Tablet at 08/24/22 1734    And    polyethylene glycol/lytes (MIRALAX) PACKET 1 Packet  1 Packet Oral QDAY PRN Abhijeet Cotter M.D.        And    magnesium hydroxide (MILK OF MAGNESIA) suspension 30 mL  30 mL Oral QDAY PRN Abhijeet Cotter M.D.        And    bisacodyl (DULCOLAX) suppository 10 mg  10 mg Rectal QDAY PRN Abhijeet Cotter M.D.        acetaminophen (TYLENOL) tablet 500 mg  500 mg Oral Q6HRS PRN Abhijeet Cotter M.D.        ibuprofen (MOTRIN) tablet 400 mg  400 mg Oral Q6HRS PRN Abhijeet Cotter M.D.        carvedilol (COREG) tablet 3.125 mg  3.125 mg Oral BID WITH MEALS Abhijeet Cotter M.D.        insulin regular (HumuLIN R,NovoLIN R) injection  1-6 Units Subcutaneous 4X/DAY ACHS Abhijeet Cotter M.D.        And    dextrose 10 % BOLUS 25 g  25 g Intravenous Q15 MIN PRN Abhijeet Cotter M.D.           Allergies  No Known Allergies    Vital Signs last 24 hours  Temp:  [36.6 °C (97.9 °F)-36.8 °C (98.2 °F)] 36.7 °C (98 °F)  Pulse:  [] 50  Resp:  [8-45] 8  BP: ()/(44-85) 128/53  SpO2:  [84 %-100 %] 98 %    Physical Exam  Physical Exam  Vitals and nursing note reviewed.   Constitutional:       General: She is not in acute distress.     Appearance: Normal appearance. She is well-developed. She is not diaphoretic.   Eyes:      General: No scleral icterus.        Right eye: No discharge.         Left eye: No discharge.      Conjunctiva/sclera: Conjunctivae normal.      Pupils: Pupils are equal, round, and reactive to light.   Neck:      Thyroid: No thyromegaly.      Vascular: No JVD.   Cardiovascular:      Rate and Rhythm: Normal rate. Rhythm irregular.      Heart sounds: Normal heart sounds. No murmur heard.    No gallop.   Pulmonary:      Effort: Pulmonary effort is normal. No respiratory distress.      Breath sounds: Normal breath sounds. No wheezing or rales.   Abdominal:      General: There is no  distension.      Palpations: Abdomen is soft.      Tenderness: There is no abdominal tenderness. There is no guarding.   Musculoskeletal:         General: No tenderness.      Comments: LUE fistula with palpable and audible thrill   Lymphadenopathy:      Cervical: No cervical adenopathy.   Skin:     General: Skin is warm.      Capillary Refill: Capillary refill takes less than 2 seconds.      Findings: No erythema or rash.   Neurological:      General: No focal deficit present.      Mental Status: She is alert and oriented to person, place, and time.      Cranial Nerves: No cranial nerve deficit.      Sensory: No sensory deficit.      Motor: No abnormal muscle tone.   Psychiatric:         Behavior: Behavior normal.     Fluids    Intake/Output Summary (Last 24 hours) at 2022  Last data filed at 2022 1845  Gross per 24 hour   Intake 240 ml   Output --   Net 240 ml     Laboratory  Recent Results (from the past 48 hour(s))   EKG    Collection Time: 22 11:21 AM   Result Value Ref Range    Report       [facility] Emergency Dept.    Test Date:  2022  Pt Name:    DIOR NICHOLS    Department: [facility]  MRN:        [id number]                      Room:       3317  Gender:     Female                       Technician: ANASTASIIA  :        1950                   Requested By:ER TRIAGE PROTOCOL  Order #:    [id number]                    Reading MD:    Measurements  Intervals                                Axis  Rate:       145                          P:  MO:                                      QRS:        70  QRSD:       88                           T:          -57  QT:         285  QTc:        443    Interpretive Statements  Atrial fibrillation with rapid V-rate  Paired ventricular premature complexes  Probable LVH with secondary repol abnrm  ST depression, probably rate related  Compared to ECG 2022 19:56:57  Ventricular premature complex(es) now present  ST (T  wave) deviation now present  Sinus rhythm no longer present  Right-axis deviation no l onger present  T-wave abnormality no longer present     CBC with Differential    Collection Time: 08/24/22 11:40 AM   Result Value Ref Range    WBC 3.2 (L) 4.8 - 10.8 K/uL    RBC 4.08 (L) 4.20 - 5.40 M/uL    Hemoglobin 12.8 12.0 - 16.0 g/dL    Hematocrit 39.0 37.0 - 47.0 %    MCV 95.6 81.4 - 97.8 fL    MCH 31.4 27.0 - 33.0 pg    MCHC 32.8 (L) 33.6 - 35.0 g/dL    RDW 50.0 35.9 - 50.0 fL    Platelet Count 143 (L) 164 - 446 K/uL    MPV 10.0 9.0 - 12.9 fL    Neutrophils-Polys 68.80 44.00 - 72.00 %    Lymphocytes 18.00 (L) 22.00 - 41.00 %    Monocytes 11.70 0.00 - 13.40 %    Eosinophils 0.60 0.00 - 6.90 %    Basophils 0.60 0.00 - 1.80 %    Immature Granulocytes 0.30 0.00 - 0.90 %    Nucleated RBC 0.00 /100 WBC    Neutrophils (Absolute) 2.17 2.00 - 7.15 K/uL    Lymphs (Absolute) 0.57 (L) 1.00 - 4.80 K/uL    Monos (Absolute) 0.37 0.00 - 0.85 K/uL    Eos (Absolute) 0.02 0.00 - 0.51 K/uL    Baso (Absolute) 0.02 0.00 - 0.12 K/uL    Immature Granulocytes (abs) 0.01 0.00 - 0.11 K/uL    NRBC (Absolute) 0.00 K/uL   Complete Metabolic Panel (CMP)    Collection Time: 08/24/22 11:40 AM   Result Value Ref Range    Sodium 134 (L) 135 - 145 mmol/L    Potassium 3.5 (L) 3.6 - 5.5 mmol/L    Chloride 93 (L) 96 - 112 mmol/L    Co2 25 20 - 33 mmol/L    Anion Gap 16.0 7.0 - 16.0    Glucose 148 (H) 65 - 99 mg/dL    Bun 14 8 - 22 mg/dL    Creatinine 3.72 (H) 0.50 - 1.40 mg/dL    Calcium 9.8 8.4 - 10.2 mg/dL    AST(SGOT) 17 12 - 45 U/L    ALT(SGPT) 16 2 - 50 U/L    Alkaline Phosphatase 68 30 - 99 U/L    Total Bilirubin 0.4 0.1 - 1.5 mg/dL    Albumin 4.4 3.2 - 4.9 g/dL    Total Protein 7.2 6.0 - 8.2 g/dL    Globulin 2.8 1.9 - 3.5 g/dL    A-G Ratio 1.6 g/dL   Troponin    Collection Time: 08/24/22 11:40 AM   Result Value Ref Range    Troponin T 58 (H) 6 - 19 ng/L   ESTIMATED GFR    Collection Time: 08/24/22 11:40 AM   Result Value Ref Range    GFR (CKD-EPI) 12  (A) >60 mL/min/1.73 m 2   proBrain Natriuretic Peptide, NT    Collection Time: 08/24/22 11:40 AM   Result Value Ref Range    NT-proBNP 9762 (H) 0 - 125 pg/mL   TSH    Collection Time: 08/24/22 11:40 AM   Result Value Ref Range    TSH 2.600 0.380 - 5.330 uIU/mL   Free Thyroxine (T4)    Collection Time: 08/24/22 11:40 AM   Result Value Ref Range    Free T-4 2.36 (H) 0.93 - 1.70 ng/dL   D-Dimer    Collection Time: 08/24/22 11:40 AM   Result Value Ref Range    D-Dimer Screen 0.81 (H) 0.00 - 0.50 ug/mL (FEU)   EC-ECHOCARDIOGRAM COMPLETE W/O CONT    Collection Time: 08/24/22  4:56 PM   Result Value Ref Range    Eject.Frac. MOD BP 59.11     Eject.Frac. MOD 4C 60.39     Eject.Frac. MOD 2C 58.54     Left Ventrical Ejection Fraction 65      Imaging  EC-ECHOCARDIOGRAM COMPLETE W/O CONT   Final Result      DX-CHEST-PORTABLE (1 VIEW)   Final Result      1.  Stable cardiomegaly      2.  Mild interstitial prominence is suggestive of edema.        Assessment/Plan    * Atrial fibrillation with rapid ventricular response (HCC)- (present on admission)  Assessment & Plan  History of pAF  RVR stimulated by pain, ? Med noncompliance as supposed to be on MTP however not taking  -- Cont dilt gtt, titrate to HR < 110  -- Cont eliquis  -- Restart carvedilol 6.25 (previously had been on 25) with holding parameters    Cardiac enzymes elevated  Assessment & Plan  Chronically elevated due to ESRD  Level not elevated above baseline  TWI on ECG unchanged from prior  Low suspicion for ACS    ESRD (end stage renal disease) on dialysis (HCC)- (present on admission)  Assessment & Plan  Due to DM2  Dialysis [facility]  Nephrologist Dr Villar  currently on the transplant list at [facility] in [place] for kidney transplant    Coronary artery disease involving native heart without angina pectoris  Assessment & Plan  s/p NOEMI RCA  -- cont statin, carvedilol, eliquis  -- discrepancy in records whether on ASA or Plavix- will ask pharmacy to  investigate    Controlled type 2 diabetes mellitus with chronic kidney disease on chronic dialysis, without long-term current use of insulin (HCC)- (present on admission)  Assessment & Plan  A1c 5.7%  RISS    RLS (restless legs syndrome)- (present on admission)  Assessment & Plan  Cont ropinirole    Chronic pain of right knee- (present on admission)  Assessment & Plan  No trauma  No imaging on record, unremarkable exam, normal ROM  Cont tylenol/NSAIDS prn  Consider XR if pain persistent    Discussed patient condition and risk of morbidity and/or mortality with Family, RN, Patient, and ERP .    The patient remains critically ill.  Critical care time = 40 minutes in directly providing and coordinating critical care and extensive data review.  No time overlap and excludes procedures.    This note was generated using voice recognition software which has a chance of producing errors of grammar and content.  I have made every reasonable attempt to find and correct any errors, but it should be expected that some may not be found prior to finalization of this note.  __________  Abhijeet Cotter MD  Pulmonary and Critical Care Medicine  [facility]

## 2022-08-25 NOTE — ASSESSMENT & PLAN NOTE
No trauma  No imaging on record, unremarkable exam, normal ROM  Cont tylenol/NSAIDS prn  Consider XR if pain persistent

## 2022-08-25 NOTE — PROGRESS NOTES
Translation tablet utilized during assessment for communication with pt. All questions and concerns answered.

## 2022-08-25 NOTE — ASSESSMENT & PLAN NOTE
Chronically elevated due to ESRD  Level not elevated above baseline  TWI on ECG unchanged from prior  Low suspicion for ACS

## 2022-08-25 NOTE — ASSESSMENT & PLAN NOTE
s/p NOEMI RCA  -- cont statin, carvedilol, eliquis  -- discrepancy in records whether on ASA or Plavix- will ask pharmacy to investigate

## 2022-08-26 ENCOUNTER — OFFICE VISIT (OUTPATIENT)
Dept: MEDICAL GROUP | Facility: MEDICAL CENTER | Age: 72
End: 2022-08-26
Payer: MEDICARE

## 2022-08-26 VITALS
TEMPERATURE: 97.7 F | WEIGHT: 138.4 LBS | HEART RATE: 86 BPM | DIASTOLIC BLOOD PRESSURE: 50 MMHG | HEIGHT: 65 IN | BODY MASS INDEX: 23.06 KG/M2 | SYSTOLIC BLOOD PRESSURE: 130 MMHG | OXYGEN SATURATION: 97 %

## 2022-08-26 DIAGNOSIS — I48.0 PAROXYSMAL A-FIB (HCC): ICD-10-CM

## 2022-08-26 DIAGNOSIS — I15.0 RENOVASCULAR HYPERTENSION: ICD-10-CM

## 2022-08-26 DIAGNOSIS — G25.81 RLS (RESTLESS LEGS SYNDROME): ICD-10-CM

## 2022-08-26 DIAGNOSIS — E03.8 SUBCLINICAL HYPOTHYROIDISM: ICD-10-CM

## 2022-08-26 PROCEDURE — 99214 OFFICE O/P EST MOD 30 MIN: CPT | Performed by: NURSE PRACTITIONER

## 2022-08-26 RX ORDER — AMLODIPINE BESYLATE 10 MG/1
10 TABLET ORAL DAILY
Qty: 90 TABLET | Refills: 3 | Status: SHIPPED | OUTPATIENT
Start: 2022-08-26 | End: 2022-11-08

## 2022-08-26 RX ORDER — GABAPENTIN 100 MG/1
CAPSULE ORAL
Qty: 126 CAPSULE | Refills: 3 | Status: SHIPPED | OUTPATIENT
Start: 2022-08-26 | End: 2022-09-28

## 2022-08-26 RX ORDER — HYDRALAZINE HYDROCHLORIDE 100 MG/1
100 TABLET, FILM COATED ORAL 2 TIMES DAILY
Qty: 180 TABLET | Refills: 3 | Status: SHIPPED | OUTPATIENT
Start: 2022-08-26 | End: 2022-11-08

## 2022-08-26 RX ORDER — LEVOTHYROXINE SODIUM 0.05 MG/1
TABLET ORAL
Qty: 90 TABLET | Refills: 3 | Status: ON HOLD | OUTPATIENT
Start: 2022-08-26 | End: 2022-11-27

## 2022-08-26 ASSESSMENT — ENCOUNTER SYMPTOMS
PALPITATIONS: 0
SPUTUM PRODUCTION: 0
HEADACHES: 0
CONSTITUTIONAL NEGATIVE: 1
COUGH: 0
WEAKNESS: 0
SHORTNESS OF BREATH: 0
PND: 0
ORTHOPNEA: 0
DIZZINESS: 0

## 2022-08-26 ASSESSMENT — FIBROSIS 4 INDEX: FIB4 SCORE: 2.424871130596428211

## 2022-08-26 NOTE — PROGRESS NOTES
Telemetry Shift Summary    Rhythm SR/SB  HR Range 57-62  Ectopy R-PVC/COUP  Measurements 0.20/0.10/0.44        Normal Values  Rhythm SR  HR Range    Measurements 0.12-0.20 / 0.06-0.10  / 0.30-0.52

## 2022-08-26 NOTE — PROGRESS NOTES
Subjective:     Tere Gallegos is a 71 y.o. female who presents for Hospital Follow-up.    POST DISCHARGE CALL:  Discharge Date:10/18/2017   Date of Outreach Call: 10/19/2017  3:23 PM  Now that you're home, how are you doing? Good  Comment:Talked with son. States she's doing pretty well,  just a bit weak.  Do you have questions about your medications? No  Did you fill your medications? Yes  Do you have a follow-up appointment scheduled?Yes  Comment:Dc clinic on 10/26. States he prefers she f/u  with PCP. Transferred to [facility] and scheduled with PCP. Dc  clinic appt cancelled.  Discharging Department[facility]  Number of Attempts: 2  Current or previous attempts completed within two business days of discharge? Yes  Provided education regarding treatment plan, medication, self-management, ADLs? Yes  Has patient completed Advance Directive? If yes, advise them to bring to appointment. No  Care Manager phone number provided? Yes  Comment:Aditi [phone]  Is there anything else I can help you with? No    HPI:   Recently hospitalized for chest pain, found to be in atrial fibrillation with rapid ventricular response.  She has known history of paroxysmal A. fib for which she is prescribed carvedilol 25 mg twice daily and Eliquis 2.5 mg twice daily.  She had not been taking her carvedilol.  Attempted to control rate with diltiazem pushes and drip titration without success, carvedilol was restarted and rate control was achieved.  She is established with cardiology for this and has follow-up scheduled next month.  Today she is feeling well, taking all of her medications as prescribed.  Her only concern is her persistent restless leg syndrome which has not been well controlled with ropinirole.  Symptoms are worse after dialysis and at night.  She has not tried gabapentin for this in the past but would like to try something different.    Current medicines (including reconciliation performed today)  Current  Outpatient Medications   Medication Sig Dispense Refill    hydrALAZINE (APRESOLINE) 100 MG tablet Take 1 Tablet by mouth 2 times a day. 180 Tablet 3    amLODIPine (NORVASC) 10 MG Tab Take 1 Tablet by mouth every day. 90 Tablet 3    levothyroxine (SYNTHROID) 50 MCG Tab TOME FERNANDO TABLETA POR VIA ORAL CADA MANANA ON AN EMPTY STOMACH 90 Tablet 3    gabapentin (NEURONTIN) 100 MG Cap Take one capsule by mouth after dialysis and nightly before bed. 126 Capsule 3    carvedilol (COREG) 3.125 MG Tab Take 1 Tablet by mouth 2 times a day with meals. 60 Tablet 0    apixaban (ELIQUIS) 2.5mg Tab Take 1 Tablet by mouth 2 times a day. Indications: Thromboembolism secondary to Atrial Fibrillation 180 Tablet 3    ROPINIRole (REQUIP) 1 MG Tab Take 1 Tablet by mouth 2 times a day. 180 Tablet 1    lisinopril (PRINIVIL) 40 MG tablet TOME FERNANDO TABLETA TODOS LOS MCCORMICK (Patient taking differently: Take 40 mg by mouth every day.) 90 Tablet 3    pioglitazone (ACTOS) 30 MG Tab TOME FERNANDO TABLETA TODOS LOS MCCORMICK (Patient taking differently: Take 30 mg by mouth every day.) 90 Tablet 3     No current facility-administered medications for this visit.       Allergies:   Patient has no known allergies.    Social History     Tobacco Use    Smoking status: Never    Smokeless tobacco: Never   Vaping Use    Vaping Use: Never used   Substance Use Topics    Alcohol use: No     Alcohol/week: 0.0 oz    Drug use: No       ROS:  Review of Systems   Constitutional: Negative.    HENT: Negative.     Respiratory:  Negative for cough, sputum production and shortness of breath.    Cardiovascular:  Negative for chest pain, palpitations, orthopnea, leg swelling and PND.   Skin: Negative.    Neurological:  Negative for dizziness, weakness and headaches.    All other systems reviewed and are negative.     Objective:     Vitals:    08/26/22 1116   BP: 130/50   BP Location: Right arm   Patient Position: Sitting   BP Cuff Size: Adult   Pulse: 86   Temp: 36.5 °C (97.7 °F)  "  TempSrc: Temporal   SpO2: 97%   Weight: 62.8 kg (138 lb 6.4 oz)   Height: 1.651 m (5' 5\")     Body mass index is 23.03 kg/m².    Physical Exam:  Constitutional: Alert, no distress.  Skin: Warm, dry, good turgor, no rashes in visible areas.  Eye: Equal, round and reactive, conjunctiva clear, lids normal.  ENMT: Mask in place  Respiratory: Unlabored respiratory effort, lungs clear to auscultation, no wheezes, no ronchi.  Cardiovascular: Irregularly irregular rhythm, no murmur, no edema.  Psych: Alert and oriented x3, normal affect and mood.      Assessment and Plan:   1. Paroxysmal A-fib (HCC)  Stable   Continue carvedilol 3.125 mg twice daily  Continue Eliquis 2.5 mg twice daily  Continue aspirin 81 mg daily  Follow-up with cardiology in 4 weeks.    2. RLS (restless legs syndrome)  Unstable  Failed ropinirole  Trial gabapentin 100 mg as needed after dialysis and as needed nightly before bed.  - gabapentin (NEURONTIN) 100 MG Cap; Take one capsule by mouth after dialysis and nightly before bed.  Dispense: 126 Capsule; Refill: 3    3. Subclinical hypothyroidism  Stable  Continue levothyroxine 50 mcg daily  - levothyroxine (SYNTHROID) 50 MCG Tab; TOME FERNANDO TABLETA POR VIA ORAL CADA MANANA ON AN EMPTY STOMACH  Dispense: 90 Tablet; Refill: 3    4. Renovascular hypertension  Stable  Continue current medications as prescribed  - hydrALAZINE (APRESOLINE) 100 MG tablet; Take 1 Tablet by mouth 2 times a day.  Dispense: 180 Tablet; Refill: 3  - amLODIPine (NORVASC) 10 MG Tab; Take 1 Tablet by mouth every day.  Dispense: 90 Tablet; Refill: 3      - Chart and discharge summary were reviewed.   - Hospitalization and results reviewed with patient.   - Medications reviewed including instructions regarding high risk medications, dosing and side effects.  - Recommended Services: No services needed at this time  - Advance directive/POLST on file?  No     Follow-up:Return in about 4 weeks (around 9/23/2022) for restless " legs.    Face-to-face transitional care management services with HIGH (today's visit is within days post discharge & LACE+ score 59+) medical decision complexity were provided.     LACE+ Historical Score Over Time (0-28: Low, 29-58: Medium, 59+: High): 73

## 2022-08-26 NOTE — PROGRESS NOTES
Discharge instructions given and discussed, signed copy in chart. Pt verbalized understanding and all questions answered. Home prescriptions discussed. Pt discharged home in stable condition on room air escorted by family. Personal belongings with patient. IV removed and tolerated well. Tele box removed, monitor tech notified.

## 2022-09-01 ENCOUNTER — APPOINTMENT (OUTPATIENT)
Dept: RADIOLOGY | Facility: MEDICAL CENTER | Age: 72
End: 2022-09-01
Attending: STUDENT IN AN ORGANIZED HEALTH CARE EDUCATION/TRAINING PROGRAM
Payer: MEDICARE

## 2022-09-01 ENCOUNTER — HOSPITAL ENCOUNTER (EMERGENCY)
Facility: MEDICAL CENTER | Age: 72
End: 2022-09-01
Attending: STUDENT IN AN ORGANIZED HEALTH CARE EDUCATION/TRAINING PROGRAM
Payer: MEDICARE

## 2022-09-01 VITALS
WEIGHT: 142.2 LBS | SYSTOLIC BLOOD PRESSURE: 141 MMHG | HEART RATE: 65 BPM | TEMPERATURE: 97.7 F | DIASTOLIC BLOOD PRESSURE: 76 MMHG | BODY MASS INDEX: 26.85 KG/M2 | HEIGHT: 61 IN | RESPIRATION RATE: 18 BRPM | OXYGEN SATURATION: 94 %

## 2022-09-01 DIAGNOSIS — G89.29 CHRONIC PAIN OF BOTH KNEES: ICD-10-CM

## 2022-09-01 DIAGNOSIS — M25.562 CHRONIC PAIN OF BOTH KNEES: ICD-10-CM

## 2022-09-01 DIAGNOSIS — M25.561 CHRONIC PAIN OF BOTH KNEES: ICD-10-CM

## 2022-09-01 LAB
ALBUMIN SERPL BCP-MCNC: 4 G/DL (ref 3.2–4.9)
ALBUMIN/GLOB SERPL: 1.5 G/DL
ALP SERPL-CCNC: 67 U/L (ref 30–99)
ALT SERPL-CCNC: 18 U/L (ref 2–50)
ANION GAP SERPL CALC-SCNC: 12 MMOL/L (ref 7–16)
AST SERPL-CCNC: 21 U/L (ref 12–45)
BILIRUB SERPL-MCNC: <0.2 MG/DL (ref 0.1–1.5)
BUN SERPL-MCNC: 41 MG/DL (ref 8–22)
CALCIUM SERPL-MCNC: 10 MG/DL (ref 8.4–10.2)
CHLORIDE SERPL-SCNC: 95 MMOL/L (ref 96–112)
CO2 SERPL-SCNC: 29 MMOL/L (ref 20–33)
CREAT SERPL-MCNC: 6.38 MG/DL (ref 0.5–1.4)
ERYTHROCYTE [DISTWIDTH] IN BLOOD BY AUTOMATED COUNT: 51.3 FL (ref 35.9–50)
GFR SERPLBLD CREATININE-BSD FMLA CKD-EPI: 6 ML/MIN/1.73 M 2
GLOBULIN SER CALC-MCNC: 2.6 G/DL (ref 1.9–3.5)
GLUCOSE BLD STRIP.AUTO-MCNC: 107 MG/DL (ref 65–99)
GLUCOSE SERPL-MCNC: 112 MG/DL (ref 65–99)
HCT VFR BLD AUTO: 34.3 % (ref 37–47)
HGB BLD-MCNC: 11.1 G/DL (ref 12–16)
MCH RBC QN AUTO: 31.6 PG (ref 27–33)
MCHC RBC AUTO-ENTMCNC: 32.4 G/DL (ref 33.6–35)
MCV RBC AUTO: 97.7 FL (ref 81.4–97.8)
PLATELET # BLD AUTO: 138 K/UL (ref 164–446)
PMV BLD AUTO: 10.7 FL (ref 9–12.9)
POTASSIUM SERPL-SCNC: 4.9 MMOL/L (ref 3.6–5.5)
PROT SERPL-MCNC: 6.6 G/DL (ref 6–8.2)
RBC # BLD AUTO: 3.51 M/UL (ref 4.2–5.4)
SODIUM SERPL-SCNC: 136 MMOL/L (ref 135–145)
WBC # BLD AUTO: 2.6 K/UL (ref 4.8–10.8)

## 2022-09-01 PROCEDURE — 80053 COMPREHEN METABOLIC PANEL: CPT

## 2022-09-01 PROCEDURE — 85027 COMPLETE CBC AUTOMATED: CPT

## 2022-09-01 PROCEDURE — 73562 X-RAY EXAM OF KNEE 3: CPT | Mod: RT

## 2022-09-01 PROCEDURE — 700102 HCHG RX REV CODE 250 W/ 637 OVERRIDE(OP): Performed by: STUDENT IN AN ORGANIZED HEALTH CARE EDUCATION/TRAINING PROGRAM

## 2022-09-01 PROCEDURE — 99284 EMERGENCY DEPT VISIT MOD MDM: CPT

## 2022-09-01 PROCEDURE — A9270 NON-COVERED ITEM OR SERVICE: HCPCS | Performed by: STUDENT IN AN ORGANIZED HEALTH CARE EDUCATION/TRAINING PROGRAM

## 2022-09-01 PROCEDURE — 73562 X-RAY EXAM OF KNEE 3: CPT | Mod: LT

## 2022-09-01 PROCEDURE — 82962 GLUCOSE BLOOD TEST: CPT

## 2022-09-01 PROCEDURE — 36415 COLL VENOUS BLD VENIPUNCTURE: CPT

## 2022-09-01 RX ORDER — OXYCODONE HYDROCHLORIDE 5 MG/1
5 TABLET ORAL ONCE
Status: COMPLETED | OUTPATIENT
Start: 2022-09-01 | End: 2022-09-01

## 2022-09-01 RX ADMIN — OXYCODONE 5 MG: 5 TABLET ORAL at 06:00

## 2022-09-01 ASSESSMENT — FIBROSIS 4 INDEX: FIB4 SCORE: 2.424871130596428211

## 2022-09-01 NOTE — ED NOTES
Pt is sleeping in position of comfort no distress pending son to come pick pt up and provided DC instrcutions

## 2022-09-01 NOTE — ED NOTES
Patient ambulated to ED for the chief complaint of pain in both knee's. Patient denies any recent falls. Patient states she is on dialysis. Patient states that she has gone to the hospital for this knee pain but that the physician she had did not give medication to alleviate the pain. Patient states that she is in so much pain that she is unable to sleep.

## 2022-09-01 NOTE — ED NOTES
Spoke to daughter Kari who initially brought pt to ER updated pt on her results and POC   Pt to be DC home, pts daughter had to go to work her brother will come pick pt up   Attempted to call #phone on file daughter Kari will call him again

## 2022-09-01 NOTE — DISCHARGE INSTRUCTIONS
It is very important that you follow-up with your primary doctor.  They will help figure out good pain control and how to manage going forwards.  Return to the ER if worsens. Please continue with tylenol and gabapentin as prescribed

## 2022-09-01 NOTE — ED NOTES
I/C from Kari, pts dtr.  She is aware Manoj is unable to come p/u pt.  Kari stated she will find someone to pick her up and take pt home.

## 2022-09-01 NOTE — ED NOTES
Spoke to son Manoj to pick pt up from the ED, stated he cannot pick pt up until 9am he has children to take to and from school pt has no means to get into her apartment and needs assistance to get up to the second floor, left voicemail for daughter Kari

## 2022-09-01 NOTE — ED PROVIDER NOTES
ED Provider Note    CHIEF COMPLAINT  Chief Complaint   Patient presents with    Nerves     Bilateral nerve and knee pain no trauma x3-4yrs worse today       HPI  Tere Gallegos is a 71 y.o. female who presents with bilateral knee pain.  She states symptom onset was 3 to 4 years ago and has been constant since then.  She describes it as a 'popping' pain in her bilateral knees which radiates down to both of her calves. She feels as though she has to constantly move her legs.   She is able to ambulate without any worsening pain.  She has recently been prescribed gabapentin by her primary doctor but has not been taking it.  She takes tylenol but states it has not helped.  Pain is described as severe.   She reports pain was so severe that she had to leave her dialysis early on Wednedsay    Patient denies any recent prolonged periods of immobilization (including hospitalization, long plane flight or car ride), no recent surgery, no recent traumatic injury, history of malignancy, DVT, PE.     She denies any recent falls or traumatic injury       REVIEW OF SYSTEMS  As per HPI, otherwise a 10 point review of systems is negative    PAST MEDICAL HISTORY  Past Medical History:   Diagnosis Date    Acquired hypothyroidism 5/4/2020    CAD (coronary artery disease)     Chronic diastolic heart failure (HCC) 5/4/2020    Coronary artery disease due to lipid rich plaque     2 Synergy NOEMI to 100% RCA stent placed    Dental disorder     partial dentures- uppers    Diabetes ([facility])     oral medication    ESRD (end stage renal disease) on dialysis ([facility]) 5/4/2020    Hemodialysis patient ([facility])     M, W, F    Hyperlipidemia     Hypertension     Kidney transplant candidate     Presence of drug-eluting stent in right coronary artery     QT prolongation 1/22/2020    RLS (restless legs syndrome) 8/5/2016    Transaminitis 12/22/2018       SOCIAL HISTORY  Social History     Tobacco Use    Smoking status: Never    Smokeless tobacco: Never  "  Vaping Use    Vaping Use: Never used   Substance Use Topics    Alcohol use: No     Alcohol/week: 0.0 oz    Drug use: No       SURGICAL HISTORY  Past Surgical History:   Procedure Laterality Date    [facility] CARDIAC CATH  9/7/2016    RCA stented with 2 Synergy drug-eluting stents.    RECOVERY  8/16/2016    Procedure: CATH LAB [facility] WITH POSSIBLE DR. CASTILLO;  Surgeon: [facility];  Location: SURGERY PRE-POST PROC UNIT [facility] [place];  Service:     [facility] CARDIAC CATH  8/16/16    100% RCA    OTHER Left 2014    left arm upper extremity fistula    OTHER ABDOMINAL SURGERY      left kidney removed due to cancer       CURRENT MEDICATIONS  Home Medications    **Home medications have not yet been reviewed for this encounter**         ALLERGIES  No Known Allergies    PHYSICAL EXAM  VITAL SIGNS: BP (!) 141/76   Pulse 70   Temp 36.5 °C (97.7 °F) (Temporal)   Resp 18   Ht 1.549 m (5' 1\")   Wt 64.5 kg (142 lb 3.2 oz)   LMP  (LMP Unknown)   SpO2 95%   BMI 26.87 kg/m²    Constitutional: Awake and alert . Chronically ill appearing   HENT: Normal inspection  Eyes: Normal inspection  Neck: Grossly normal range of motion.  Cardiovascular: Normal heart rate, Normal rhythm.  Symmetric peripheral pulses.   Thorax & Lungs: No respiratory distress, No wheezing, No rales, No rhonchi, No chest tenderness.   Abdomen: Soft, non-distended, nontender, no mass  Skin: No obvious rash.  Back: No tenderness, No CVA tenderness.   Extremities:  No calf tenderness or swelling   Knee Exam:   Left:  No obvious deformity.  No erythema, edema, ecchymosis, or broken skin.  No effusion.  Full ROM intact.  No crepitus.  No point tenderness of patella, fibular head or joint line of tibial plateau.  Negative anterior/posterior drawer sign.  No pain with varus/valgus movement.  2+ DP and PT pulses. Sensation of LE grossly intact.  5/5 strength with ankle dorsiflexion and plantarflexion. 5/5 strength with dorsiflexion of great toe.   Right  No obvious " deformity.  No erythema, edema, ecchymosis, or broken skin.  No effusion.  Full ROM intact.  No crepitus.  No point tenderness of patella, fibular head or joint line of tibial plateau.  Negative anterior/posterior drawer sign.  No pain with varus/valgus movement.  2+ DP and PT pulses. Sensation of LE grossly intact.  5/5 strength with ankle dorsiflexion and plantarflexion. 5/5 strength with dorsiflexion of great toe.  Neurologic: Grossly normal   Psychiatric: Normal for situation    RADIOLOGY/PROCEDURES  DX-KNEE 3 VIEWS RIGHT   Final Result         1.  No acute traumatic bony injury.   2.  Atherosclerosis      DX-KNEE 3 VIEWS LEFT   Final Result         1.  No acute traumatic bony injury.           Imaging is interpreted by radiologist    Labs:  Results for orders placed or performed during the hospital encounter of 09/01/22   CMP   Result Value Ref Range    Sodium 136 135 - 145 mmol/L    Potassium 4.9 3.6 - 5.5 mmol/L    Chloride 95 (L) 96 - 112 mmol/L    Co2 29 20 - 33 mmol/L    Anion Gap 12.0 7.0 - 16.0    Glucose 112 (H) 65 - 99 mg/dL    Bun 41 (H) 8 - 22 mg/dL    Creatinine 6.38 (HH) 0.50 - 1.40 mg/dL    Calcium 10.0 8.4 - 10.2 mg/dL    AST(SGOT) 21 12 - 45 U/L    ALT(SGPT) 18 2 - 50 U/L    Alkaline Phosphatase 67 30 - 99 U/L    Total Bilirubin <0.2 0.1 - 1.5 mg/dL    Albumin 4.0 3.2 - 4.9 g/dL    Total Protein 6.6 6.0 - 8.2 g/dL    Globulin 2.6 1.9 - 3.5 g/dL    A-G Ratio 1.5 g/dL   CBC WITHOUT DIFFERENTIAL   Result Value Ref Range    WBC 2.6 (L) 4.8 - 10.8 K/uL    RBC 3.51 (L) 4.20 - 5.40 M/uL    Hemoglobin 11.1 (L) 12.0 - 16.0 g/dL    Hematocrit 34.3 (L) 37.0 - 47.0 %    MCV 97.7 81.4 - 97.8 fL    MCH 31.6 27.0 - 33.0 pg    MCHC 32.4 (L) 33.6 - 35.0 g/dL    RDW 51.3 (H) 35.9 - 50.0 fL    Platelet Count 138 (L) 164 - 446 K/uL    MPV 10.7 9.0 - 12.9 fL   ESTIMATED GFR   Result Value Ref Range    GFR (CKD-EPI) 6 (A) >60 mL/min/1.73 m 2   POCT glucose device results   Result Value Ref Range    POC Glucose, Blood  107 (H) 65 - 99 mg/dL       Medications   oxyCODONE immediate-release (ROXICODONE) tablet 5 mg (5 mg Oral Given 9/1/22 0600)       Measures:  HTN/IDDM FOLLOW UP:  The patient is referred to a primary physician for blood pressure management, diabetic screening, and for all other preventive health concerns    COURSE & MEDICAL DECISION MAKING    Patient is a 71 y.o.Fwho presents to the Emergency Department with bilateral knee pain. Please see HPI above for details.    Differential diagnoses include but not limited to: fracture; dislocation; sprain; strain; ligamentous and/or meniscal injury; soft tissue contusion; septic joint, and soft tissue infection.  She does not have any asymmetric swelling, acute symptoms or risk factors to suggest DVT.  She has no limitation in range of motion or signs of infection, and I do not suspect septic arthritis. No evidence of cellulitis on exam.     Patient arrived stable and afebrile. XR notable for no bony abnormality.  Labs consistent with known renal failure, no electrolyte derangement including normal K.     Patient given oxycodone for pain.     It is unclear exactly what is the etiology of this patient's pain.  It is quite possible that she has severe diabetic neuropathy.  My suspicion for any acute pathology is very low.     I spent a long time with the patient and her daughter regarding improved management of her chronic health concerns and stressed the need for close PCP follow-up.  They expressed understanding.   Advised to continue with tylenol and gabapentin.     Interaction performed with .    Patient referred to primary provider for blood pressure management    FINAL IMPRESSION  1. Chronic pain of both knees Chronic               This dictation was created using voice recognition software. The accuracy of the dictation is limited to the abilities of the software.  The nursing notes were reviewed and certain aspects of this information were incorporated  into this note.      Electronically signed by: Cristina Rodriguez M.D., 9/1/2022 4:55 AM

## 2022-09-01 NOTE — ED TRIAGE NOTES
Knee pain & nerve pain no trauma able to ambulate reports nerve pain has been on/off 3-4years worst last night

## 2022-09-02 ENCOUNTER — OFFICE VISIT (OUTPATIENT)
Dept: MEDICAL GROUP | Facility: MEDICAL CENTER | Age: 72
End: 2022-09-02
Payer: MEDICARE

## 2022-09-02 VITALS
HEART RATE: 76 BPM | SYSTOLIC BLOOD PRESSURE: 128 MMHG | TEMPERATURE: 97 F | WEIGHT: 140 LBS | HEIGHT: 61 IN | BODY MASS INDEX: 26.43 KG/M2 | OXYGEN SATURATION: 96 % | DIASTOLIC BLOOD PRESSURE: 62 MMHG

## 2022-09-02 DIAGNOSIS — G62.9 NEUROPATHY: ICD-10-CM

## 2022-09-02 DIAGNOSIS — N18.6 ESRD (END STAGE RENAL DISEASE) ON DIALYSIS (HCC): ICD-10-CM

## 2022-09-02 DIAGNOSIS — E11.22 CONTROLLED TYPE 2 DIABETES MELLITUS WITH CHRONIC KIDNEY DISEASE ON CHRONIC DIALYSIS, WITHOUT LONG-TERM CURRENT USE OF INSULIN (HCC): ICD-10-CM

## 2022-09-02 DIAGNOSIS — Z99.2 CONTROLLED TYPE 2 DIABETES MELLITUS WITH CHRONIC KIDNEY DISEASE ON CHRONIC DIALYSIS, WITHOUT LONG-TERM CURRENT USE OF INSULIN (HCC): ICD-10-CM

## 2022-09-02 DIAGNOSIS — I50.31 ACUTE DIASTOLIC CHF (CONGESTIVE HEART FAILURE) (HCC): ICD-10-CM

## 2022-09-02 DIAGNOSIS — Z99.2 ESRD (END STAGE RENAL DISEASE) ON DIALYSIS (HCC): ICD-10-CM

## 2022-09-02 DIAGNOSIS — N18.6 CONTROLLED TYPE 2 DIABETES MELLITUS WITH CHRONIC KIDNEY DISEASE ON CHRONIC DIALYSIS, WITHOUT LONG-TERM CURRENT USE OF INSULIN (HCC): ICD-10-CM

## 2022-09-02 LAB
HBA1C MFR BLD: 5.6 % (ref 0–5.6)
INT CON NEG: NEGATIVE
INT CON POS: POSITIVE

## 2022-09-02 PROCEDURE — 99214 OFFICE O/P EST MOD 30 MIN: CPT | Performed by: STUDENT IN AN ORGANIZED HEALTH CARE EDUCATION/TRAINING PROGRAM

## 2022-09-02 PROCEDURE — 83036 HEMOGLOBIN GLYCOSYLATED A1C: CPT | Performed by: STUDENT IN AN ORGANIZED HEALTH CARE EDUCATION/TRAINING PROGRAM

## 2022-09-02 RX ORDER — GLUCOSAMINE HCL/CHONDROITIN SU 500-400 MG
CAPSULE ORAL
Qty: 100 EACH | Refills: 0 | Status: SHIPPED | OUTPATIENT
Start: 2022-09-02 | End: 2022-12-19

## 2022-09-02 RX ORDER — LANCETS 30 GAUGE
EACH MISCELLANEOUS
Qty: 100 EACH | Refills: 0 | Status: SHIPPED | OUTPATIENT
Start: 2022-09-02 | End: 2022-12-08 | Stop reason: SDUPTHER

## 2022-09-02 ASSESSMENT — ENCOUNTER SYMPTOMS
FEVER: 0
CHILLS: 0
SHORTNESS OF BREATH: 0

## 2022-09-02 ASSESSMENT — FIBROSIS 4 INDEX: FIB4 SCORE: 2.58

## 2022-09-02 NOTE — PROGRESS NOTES
Subjective:    services used for this encounter  CC: ER follow up, diabetes, and neuropathy     HPI:   Tere presents today for the following;    Problem   Neuropathy    Patient has pain in her legs after dialysis and at night. She is already taking ropinirole for RLS. Imaging of the both knee's did not report any specific etiology for her pain     Acute Diastolic Chf (Congestive Heart Failure) ([facility])    Patient currently on lisinopril and carvedilol. But she feels dizzy after taking carvedilol     Esrd (End Stage Renal Disease) On Dialysis ([facility])    -Dialysis MWF  -follows with nephrology     Controlled Type 2 Diabetes Mellitus With Chronic Kidney Disease On Chronic Dialysis, Without Long-Term Current Use of Insulin ([facility])    Patient is only on pioglitazone for her diabetes. She used to follow with endocrinology but has not visited since December 2021         Current Outpatient Medications Ordered in Epic   Medication Sig Dispense Refill    Blood Glucose Meter Kit Test blood sugar as recommended by provider. Freestyle Pearl blood glucose monitoring kit. 1 Kit 0    Blood Glucose Test Strips Use one Freestyle Pearl strip to test blood sugar once daily . 100 Strip 0    Lancets Use one Freestyle Pearl lancet to test blood sugar once daily . 100 Each 0    Alcohol Swabs Wipe site with prep pad prior to injection. 100 Each 0    hydrALAZINE (APRESOLINE) 100 MG tablet Take 1 Tablet by mouth 2 times a day. 180 Tablet 3    amLODIPine (NORVASC) 10 MG Tab Take 1 Tablet by mouth every day. 90 Tablet 3    levothyroxine (SYNTHROID) 50 MCG Tab TOME FERNANDO TABLETA POR VIA ORAL CADA MANANA ON AN EMPTY STOMACH 90 Tablet 3    gabapentin (NEURONTIN) 100 MG Cap Take one capsule by mouth after dialysis and nightly before bed. (Patient taking differently: Take 200 mg by mouth 2 times a day. Take one capsule by mouth after dialysis and nightly before bed.) 126 Capsule 3    carvedilol (COREG) 3.125 MG Tab Take 1 Tablet by  "mouth 2 times a day with meals. (Patient taking differently: Take 1.5625 mg by mouth 2 times a day with meals.) 60 Tablet 0    apixaban (ELIQUIS) 2.5mg Tab Take 1 Tablet by mouth 2 times a day. Indications: Thromboembolism secondary to Atrial Fibrillation 180 Tablet 3    ROPINIRole (REQUIP) 1 MG Tab Take 1 Tablet by mouth 2 times a day. 180 Tablet 1    lisinopril (PRINIVIL) 40 MG tablet TOME FERNANDO TABLETA TODOS LOS MCCORMICK (Patient taking differently: Take 40 mg by mouth every day.) 90 Tablet 3    pioglitazone (ACTOS) 30 MG Tab TOME FERNANDO TABLETA TODOS LOS MCCORMICK (Patient taking differently: Take 30 mg by mouth every day.) 90 Tablet 3     No current Epic-ordered facility-administered medications on file.           ROS:  Review of Systems   Constitutional:  Negative for chills and fever.   Respiratory:  Negative for shortness of breath.    Cardiovascular:  Negative for chest pain.   Musculoskeletal:  Positive for joint pain.     Objective:     Exam:  /62 (BP Location: Left arm, Patient Position: Sitting, BP Cuff Size: Adult)   Pulse 76   Temp 36.1 °C (97 °F) (Temporal)   Ht 1.549 m (5' 1\")   Wt 63.5 kg (140 lb)   LMP  (LMP Unknown)   SpO2 96%   BMI 26.45 kg/m²  Body mass index is 26.45 kg/m².    Physical Exam  Constitutional:       General: She is not in acute distress.     Appearance: She is not ill-appearing.   Pulmonary:      Effort: Pulmonary effort is normal.   Musculoskeletal:         General: No swelling or deformity.      Right lower leg: No edema.      Left lower leg: No edema.   Neurological:      Mental Status: She is alert.   Psychiatric:         Mood and Affect: Mood normal.         Behavior: Behavior normal.         Thought Content: Thought content normal.         Judgment: Judgment normal.             Assessment & Plan:     Problem List Items Addressed This Visit       Acute diastolic CHF (congestive heart failure) (HCC)     Chronic  -patient told take half a pill of her current carvedilol BID      "    Controlled type 2 diabetes mellitus with chronic kidney disease on chronic dialysis, without long-term current use of insulin (HCC)     Chronic-stable  -POCT in office was well controlled          Relevant Medications    Blood Glucose Meter Kit    Blood Glucose Test Strips    Lancets    Alcohol Swabs    Other Relevant Orders    POCT  A1C (Completed)    ESRD (end stage renal disease) on dialysis (HCC)     Chronic  -continue dialysis per nephrology         Neuropathy     Chronic  -patient told to increase gabapentin to 200mg                 No follow-ups on file.    Please note that this dictation was created using voice recognition software. I have made every reasonable attempt to correct obvious errors, but I expect that there are errors of grammar and possibly content that I did not discover before finalizing the note.

## 2022-09-16 ENCOUNTER — HOSPITAL ENCOUNTER (EMERGENCY)
Facility: MEDICAL CENTER | Age: 72
End: 2022-09-17
Attending: STUDENT IN AN ORGANIZED HEALTH CARE EDUCATION/TRAINING PROGRAM
Payer: MEDICARE

## 2022-09-16 DIAGNOSIS — R10.13 EPIGASTRIC ABDOMINAL PAIN: ICD-10-CM

## 2022-09-16 LAB
ALBUMIN SERPL BCP-MCNC: 3.9 G/DL (ref 3.2–4.9)
ALBUMIN/GLOB SERPL: 1.6 G/DL
ALP SERPL-CCNC: 65 U/L (ref 30–99)
ALT SERPL-CCNC: 19 U/L (ref 2–50)
ANION GAP SERPL CALC-SCNC: 9 MMOL/L (ref 7–16)
AST SERPL-CCNC: 21 U/L (ref 12–45)
BASOPHILS # BLD AUTO: 0.3 % (ref 0–1.8)
BASOPHILS # BLD: 0.01 K/UL (ref 0–0.12)
BILIRUB SERPL-MCNC: 0.2 MG/DL (ref 0.1–1.5)
BUN SERPL-MCNC: 21 MG/DL (ref 8–22)
CALCIUM SERPL-MCNC: 9.3 MG/DL (ref 8.4–10.2)
CHLORIDE SERPL-SCNC: 94 MMOL/L (ref 96–112)
CO2 SERPL-SCNC: 31 MMOL/L (ref 20–33)
CREAT SERPL-MCNC: 4.01 MG/DL (ref 0.5–1.4)
EKG IMPRESSION: NORMAL
EOSINOPHIL # BLD AUTO: 0.02 K/UL (ref 0–0.51)
EOSINOPHIL NFR BLD: 0.7 % (ref 0–6.9)
ERYTHROCYTE [DISTWIDTH] IN BLOOD BY AUTOMATED COUNT: 49.8 FL (ref 35.9–50)
GFR SERPLBLD CREATININE-BSD FMLA CKD-EPI: 11 ML/MIN/1.73 M 2
GLOBULIN SER CALC-MCNC: 2.5 G/DL (ref 1.9–3.5)
GLUCOSE SERPL-MCNC: 106 MG/DL (ref 65–99)
HCT VFR BLD AUTO: 30.6 % (ref 37–47)
HGB BLD-MCNC: 10 G/DL (ref 12–16)
IMM GRANULOCYTES # BLD AUTO: 0.02 K/UL (ref 0–0.11)
IMM GRANULOCYTES NFR BLD AUTO: 0.7 % (ref 0–0.9)
LIPASE SERPL-CCNC: 85 U/L (ref 7–58)
LYMPHOCYTES # BLD AUTO: 0.69 K/UL (ref 1–4.8)
LYMPHOCYTES NFR BLD: 23.9 % (ref 22–41)
MCH RBC QN AUTO: 31.7 PG (ref 27–33)
MCHC RBC AUTO-ENTMCNC: 32.7 G/DL (ref 33.6–35)
MCV RBC AUTO: 97.1 FL (ref 81.4–97.8)
MONOCYTES # BLD AUTO: 0.38 K/UL (ref 0–0.85)
MONOCYTES NFR BLD AUTO: 13.1 % (ref 0–13.4)
NEUTROPHILS # BLD AUTO: 1.77 K/UL (ref 2–7.15)
NEUTROPHILS NFR BLD: 61.3 % (ref 44–72)
NRBC # BLD AUTO: 0 K/UL
NRBC BLD-RTO: 0 /100 WBC
PLATELET # BLD AUTO: 135 K/UL (ref 164–446)
PMV BLD AUTO: 10.7 FL (ref 9–12.9)
POTASSIUM SERPL-SCNC: 4.5 MMOL/L (ref 3.6–5.5)
PROT SERPL-MCNC: 6.4 G/DL (ref 6–8.2)
RBC # BLD AUTO: 3.15 M/UL (ref 4.2–5.4)
SODIUM SERPL-SCNC: 134 MMOL/L (ref 135–145)
WBC # BLD AUTO: 2.9 K/UL (ref 4.8–10.8)

## 2022-09-16 PROCEDURE — 36415 COLL VENOUS BLD VENIPUNCTURE: CPT

## 2022-09-16 PROCEDURE — 700102 HCHG RX REV CODE 250 W/ 637 OVERRIDE(OP): Performed by: STUDENT IN AN ORGANIZED HEALTH CARE EDUCATION/TRAINING PROGRAM

## 2022-09-16 PROCEDURE — 83690 ASSAY OF LIPASE: CPT

## 2022-09-16 PROCEDURE — 80053 COMPREHEN METABOLIC PANEL: CPT

## 2022-09-16 PROCEDURE — 93005 ELECTROCARDIOGRAM TRACING: CPT | Performed by: STUDENT IN AN ORGANIZED HEALTH CARE EDUCATION/TRAINING PROGRAM

## 2022-09-16 PROCEDURE — 700111 HCHG RX REV CODE 636 W/ 250 OVERRIDE (IP): Performed by: STUDENT IN AN ORGANIZED HEALTH CARE EDUCATION/TRAINING PROGRAM

## 2022-09-16 PROCEDURE — 93005 ELECTROCARDIOGRAM TRACING: CPT

## 2022-09-16 PROCEDURE — 96374 THER/PROPH/DIAG INJ IV PUSH: CPT

## 2022-09-16 PROCEDURE — 85025 COMPLETE CBC W/AUTO DIFF WBC: CPT

## 2022-09-16 PROCEDURE — A9270 NON-COVERED ITEM OR SERVICE: HCPCS | Performed by: STUDENT IN AN ORGANIZED HEALTH CARE EDUCATION/TRAINING PROGRAM

## 2022-09-16 PROCEDURE — 99284 EMERGENCY DEPT VISIT MOD MDM: CPT

## 2022-09-16 RX ORDER — ONDANSETRON 2 MG/ML
4 INJECTION INTRAMUSCULAR; INTRAVENOUS ONCE
Status: COMPLETED | OUTPATIENT
Start: 2022-09-16 | End: 2022-09-16

## 2022-09-16 RX ORDER — FAMOTIDINE 20 MG/1
20 TABLET, FILM COATED ORAL ONCE
Status: COMPLETED | OUTPATIENT
Start: 2022-09-16 | End: 2022-09-16

## 2022-09-16 RX ADMIN — LIDOCAINE HYDROCHLORIDE 30 ML: 20 SOLUTION OROPHARYNGEAL at 23:10

## 2022-09-16 RX ADMIN — FAMOTIDINE 20 MG: 20 TABLET, FILM COATED ORAL at 23:02

## 2022-09-16 RX ADMIN — ONDANSETRON 4 MG: 2 INJECTION INTRAMUSCULAR; INTRAVENOUS at 23:00

## 2022-09-16 ASSESSMENT — FIBROSIS 4 INDEX: FIB4 SCORE: 2.58

## 2022-09-17 VITALS
HEART RATE: 59 BPM | BODY MASS INDEX: 25.2 KG/M2 | TEMPERATURE: 98 F | OXYGEN SATURATION: 96 % | HEIGHT: 63 IN | WEIGHT: 142.2 LBS | SYSTOLIC BLOOD PRESSURE: 118 MMHG | DIASTOLIC BLOOD PRESSURE: 75 MMHG | RESPIRATION RATE: 20 BRPM

## 2022-09-17 PROCEDURE — 700102 HCHG RX REV CODE 250 W/ 637 OVERRIDE(OP): Performed by: STUDENT IN AN ORGANIZED HEALTH CARE EDUCATION/TRAINING PROGRAM

## 2022-09-17 PROCEDURE — A9270 NON-COVERED ITEM OR SERVICE: HCPCS | Performed by: STUDENT IN AN ORGANIZED HEALTH CARE EDUCATION/TRAINING PROGRAM

## 2022-09-17 RX ORDER — FAMOTIDINE 20 MG/1
20 TABLET, FILM COATED ORAL 2 TIMES DAILY
Qty: 30 TABLET | Refills: 0 | Status: ON HOLD | OUTPATIENT
Start: 2022-09-17 | End: 2022-10-19

## 2022-09-17 RX ORDER — GABAPENTIN 300 MG/1
300 CAPSULE ORAL ONCE
Status: COMPLETED | OUTPATIENT
Start: 2022-09-17 | End: 2022-09-17

## 2022-09-17 RX ORDER — SUCRALFATE 1 G/1
1 TABLET ORAL
Qty: 21 TABLET | Refills: 0 | Status: SHIPPED | OUTPATIENT
Start: 2022-09-17 | End: 2022-09-24

## 2022-09-17 RX ORDER — ALUMINA, MAGNESIA, AND SIMETHICONE 2400; 2400; 240 MG/30ML; MG/30ML; MG/30ML
10 SUSPENSION ORAL 4 TIMES DAILY PRN
Qty: 560 ML | Refills: 0 | Status: SHIPPED | OUTPATIENT
Start: 2022-09-17 | End: 2022-09-20

## 2022-09-17 RX ORDER — GABAPENTIN 100 MG/1
100 CAPSULE ORAL 3 TIMES DAILY
Qty: 90 CAPSULE | Refills: 0 | Status: SHIPPED | OUTPATIENT
Start: 2022-09-17 | End: 2022-09-28

## 2022-09-17 RX ADMIN — GABAPENTIN 300 MG: 300 CAPSULE ORAL at 00:15

## 2022-09-17 NOTE — ED TRIAGE NOTES
"Pt here with c/o    Chief Complaint   Patient presents with    Epigastric Pain     Started today- denies N/VD       BP (!) 146/62   Pulse 62   Temp 36.6 °C (97.8 °F) (Temporal)   Resp 20   Ht 1.6 m (5' 3\")   Wt 64.5 kg (142 lb 3.2 oz)   LMP  (LMP Unknown)   SpO2 97%   BMI 25.19 kg/m²    "

## 2022-09-17 NOTE — ED NOTES
Attempted to DC pt. Pt states she cannot go due to legs hurting. Pt requests to speak with her ERP. Dr Griffith notified and will speak with pt.

## 2022-09-17 NOTE — ED PROVIDER NOTES
CHIEF COMPLAINT  Chief Complaint   Patient presents with    Epigastric Pain     Started today- denies N/VD       HPI  Tere Gallegos is a 71 y.o. female who presents evaluation of epigastric pain.  Started about noon this evening after eating.  Described as a burning sensation in the middle of her stomach.  Is nonradiating moderate in severity no alleviating or exacerbating factors.  Patient does have a history of end-stage renal disease on dialysis Monday Wednesday Friday did have her dialysis today.  patient admits to nausea no vomiting no diarrhea no fevers.  REVIEW OF SYSTEMS  See HPI for further details. All other systems are negative.     PAST MEDICAL HISTORY   has a past medical history of Acquired hypothyroidism (5/4/2020), CAD (coronary artery disease), Chronic diastolic heart failure (HCC) (5/4/2020), Coronary artery disease due to lipid rich plaque, Dental disorder, Diabetes ([facility]), ESRD (end stage renal disease) on dialysis ([facility]) (5/4/2020), Hemodialysis patient ([facility]), Hyperlipidemia, Hypertension, Kidney transplant candidate, Presence of drug-eluting stent in right coronary artery, QT prolongation (1/22/2020), RLS (restless legs syndrome) (8/5/2016), and Transaminitis (12/22/2018).    SOCIAL HISTORY  Social History     Tobacco Use    Smoking status: Never    Smokeless tobacco: Never   Vaping Use    Vaping Use: Never used   Substance and Sexual Activity    Alcohol use: No     Alcohol/week: 0.0 oz    Drug use: No    Sexual activity: Never       SURGICAL HISTORY   has a past surgical history that includes recovery (8/16/2016); other abdominal surgery; other (Left, 2014); zzz cardiac cath (8/16/16); and zzz cardiac cath (9/7/2016).    CURRENT MEDICATIONS  Home Medications       Reviewed by Louise Rice R.N. (Registered Nurse) on 09/16/22 at 5565  Med List Status: Partial     Medication Last Dose Status   Alcohol Swabs  Active   amLODIPine (NORVASC) 10 MG Tab  Active   apixaban (ELIQUIS)  "2.5mg Tab  Active   Blood Glucose Meter Kit  Active   Blood Glucose Test Strips  Active   carvedilol (COREG) 3.125 MG Tab  Active   gabapentin (NEURONTIN) 100 MG Cap  Active   hydrALAZINE (APRESOLINE) 100 MG tablet  Active   Lancets  Active   levothyroxine (SYNTHROID) 50 MCG Tab  Active   lisinopril (PRINIVIL) 40 MG tablet  Active   pioglitazone (ACTOS) 30 MG Tab  Active   ROPINIRole (REQUIP) 1 MG Tab  Active                    ALLERGIES  No Known Allergies    FAMILY HISTORY  No pertinent family history    PHYSICAL EXAM   BP (!) 146/62   Pulse 62   Temp 36.6 °C (97.8 °F) (Temporal)   Resp 20   Ht 1.6 m (5' 3\")   Wt 64.5 kg (142 lb 3.2 oz)   LMP  (LMP Unknown)   SpO2 97%   BMI 25.19 kg/m²  @TANISHA[[id number]::@   Pulse ox interpretation:I interpret this pulse ox as normal.  VITALS - vital signs documented prior to this note have been reviewed and noted,  GENERAL - awake, alert, oriented, GCS 15, no apparent distress, non-toxic  appearing  HEENT - normocephalic, atraumatic, pupils equal, sclera anicteric, mucus  membranes moist  NECK - supple, no meningismus, full active range of motion, trachea midline  CARDIOVASCULAR -systolic ejection murmur regular rate and rhythm sinus rhythm on the monitor  PULMONARY - no respiratory distress, speaking in full sentences, clear to  auscultation bilaterally, no wheezing/ronchi/rales, no accessory muscle use  GASTROINTESTINAL -left-sided anterior abdominal scar, mild epigastric tenderness upon deep palpation otherwise soft, non-tender, non-distended, no rebound, guarding,  or peritonitis  GENITOURINARY - Deferred  NEUROLOGIC - Awake alert, normal mental status, speech fluid, cognition  normal, moves all extremities  MUSCULOSKELETAL - no obvious asymmetry or deformities present  EXTREMITIES - warm, well-perfused, no cyanosis or significant edema fistula in the left upper extremity.   DERMATOLOGIC - warm, dry, no rashes, no jaundice  PSYCHIATRIC - normal affect, normal insight, " normal concentration            EKG  Sinus rhythm  Nonspecific T abnormalities, lateral leads  Compared to ECG 08/24/2022 16:48:21  Bradycardia, nonsinus no longer present  Possible ischemia no longer present  T-wave abnormality still present unchanged when compared to prior  Rate is 67  Normal axis normal KS QRS and QTc interval    LABS      Labs Reviewed   CBC WITH DIFFERENTIAL - Abnormal; Notable for the following components:       Result Value    WBC 2.9 (*)     RBC 3.15 (*)     Hemoglobin 10.0 (*)     Hematocrit 30.6 (*)     MCHC 32.7 (*)     Platelet Count 135 (*)     Neutrophils (Absolute) 1.77 (*)     Lymphs (Absolute) 0.69 (*)     All other components within normal limits   COMP METABOLIC PANEL - Abnormal; Notable for the following components:    Sodium 134 (*)     Chloride 94 (*)     Glucose 106 (*)     Creatinine 4.01 (*)     All other components within normal limits   LIPASE - Abnormal; Notable for the following components:    Lipase 85 (*)     All other components within normal limits   ESTIMATED GFR - Abnormal; Notable for the following components:    GFR (CKD-EPI) 11 (*)     All other components within normal limits         Pertinent Labs & Imaging studies reviewed. (See chart for details)    RADIOLOGY  No orders to display             ED COURSE/PROCEDURES      Reevaluation at 00 11 patient is resting comfortably no acute distress she is complaining of a burning sensation in her bilateral feet does have a history of underlying diabetic neuropathy.  Gabapentin is ordered.  Repeat abdominal exam demonstrates no rebound guarding or peritonitis her pain completely resolved after Pepcid and GI cocktail.  All pertinent lab results are explained.  Patient was is tolerating p.o. in the ER.    Medications   gabapentin (NEURONTIN) capsule 300 mg (has no administration in time range)   hyoscyamine-lidocaine-Maalox (GI Cocktail) oral susp cup 30 mL (30 mL Oral Given 9/16/22 2310)   famotidine (PEPCID) tablet 20  mg (20 mg Oral Given 9/16/22 2302)   ondansetron (ZOFRAN) syringe/vial injection 4 mg (4 mg Intravenous Given 9/16/22 2300)               MEDICAL DECISION MAKING    Patient presented for evaluation of epigastric discomfort.  Differential initial included was not limited to peptic ulcer disease, pancreatitis, less likely hepatitis acute cholecystitis gastritis, GERD, colitis among many other considerations.  EKG does show inverted T waves in lead V4 V5 though this is unchanged when compared to prior patient has no chest pain.  Low concern for underlying ACS.  Labs remarkable for a leukopenia which is chronic in nature and appears near her baseline.  As well as elevated creatinine which is consistent with the patient's history of end-stage renal disease.  Lipase minimally elevated though is chronically elevated has no specific left upper quadrant tenderness low concern for underlying pancreatitis.  Her pain completely resolved after GI cocktail and Pepcid believe this may represent underlying gastritis, versus GERD, and given that she has no abdominal tenderness after treatment with a reassuring abdominal exam is tolerating p.o. at this time I do not see an indication that would warrant emergent CT abdomen pelvis at this time did have a shared decision-making oversedation with the patient and she did feel comfortable returning in 12 to 24 hours for an abdominal recheck if her symptoms did not improve.  Patient had an additional complaint of a burning sensation in her bilateral feet.  Does have a reported history of diabetic neuropathy.  She was given a dose of gabapentin for this.  Will be discharged on low-dose gabapentin.  Instructed follow-up with her PCP.  All pertinent return precautions were discussed with the patient, and they expressed understanding.  Patient was discharged in a stable condition          FINAL IMPRESSION  1.  Epigastric abdominal pain  2.  Bilateral peripheral paresthesias  3.  End-stage renal  disease  4.  Leukopenia  5.  Elevated blood pressure         Electronically signed by: Tom Griffith D.O., 9/16/2022 11:15 PM      Dictation Disclaimer  Please note this report has been produced using speech recognition software and  may contain errors related to that system, including errors seen in grammar,  punctuation and spelling, as well as words and phrases that may be inappropriate.  If there are any questions or concerns, please feel free to contact the dictating  physician for clarification.

## 2022-09-17 NOTE — ED NOTES
Pt to ED today for evaluation of epigastric pain starting this morning and increasing through out the day. Pt denies simulair pain in past. Denies N/V. Pain radiated laterally across upper abd. Dialysis was today and pt states run went full time without a problem.

## 2022-09-17 NOTE — DISCHARGE INSTRUCTIONS
If your symptoms do not improve in the next 12 to 24 hours you develop any fevers, uncontrolled vomiting, black tarry stools bloody stools, return to the ER for recheck.  Take the medications as prescribed.  And return with any other concerning symptoms.

## 2022-09-20 ENCOUNTER — OFFICE VISIT (OUTPATIENT)
Dept: CARDIOLOGY | Facility: MEDICAL CENTER | Age: 72
End: 2022-09-20
Payer: MEDICARE

## 2022-09-20 VITALS
BODY MASS INDEX: 26.22 KG/M2 | SYSTOLIC BLOOD PRESSURE: 188 MMHG | HEIGHT: 63 IN | HEART RATE: 72 BPM | DIASTOLIC BLOOD PRESSURE: 60 MMHG | WEIGHT: 148 LBS | RESPIRATION RATE: 14 BRPM | OXYGEN SATURATION: 95 %

## 2022-09-20 DIAGNOSIS — I48.0 PAROXYSMAL ATRIAL FIBRILLATION (HCC): ICD-10-CM

## 2022-09-20 DIAGNOSIS — E11.22 CONTROLLED TYPE 2 DIABETES MELLITUS WITH CHRONIC KIDNEY DISEASE ON CHRONIC DIALYSIS, WITHOUT LONG-TERM CURRENT USE OF INSULIN (HCC): ICD-10-CM

## 2022-09-20 DIAGNOSIS — N18.6 CONTROLLED TYPE 2 DIABETES MELLITUS WITH CHRONIC KIDNEY DISEASE ON CHRONIC DIALYSIS, WITHOUT LONG-TERM CURRENT USE OF INSULIN (HCC): ICD-10-CM

## 2022-09-20 DIAGNOSIS — Z76.82 KIDNEY TRANSPLANT CANDIDATE: Chronic | ICD-10-CM

## 2022-09-20 DIAGNOSIS — Z99.2 ESRD (END STAGE RENAL DISEASE) ON DIALYSIS (HCC): ICD-10-CM

## 2022-09-20 DIAGNOSIS — I25.83 CORONARY ARTERY DISEASE DUE TO LIPID RICH PLAQUE: ICD-10-CM

## 2022-09-20 DIAGNOSIS — N18.6 ESRD (END STAGE RENAL DISEASE) ON DIALYSIS (HCC): ICD-10-CM

## 2022-09-20 DIAGNOSIS — I10 ESSENTIAL HYPERTENSION: ICD-10-CM

## 2022-09-20 DIAGNOSIS — I25.10 CORONARY ARTERY DISEASE DUE TO LIPID RICH PLAQUE: ICD-10-CM

## 2022-09-20 DIAGNOSIS — E78.5 DYSLIPIDEMIA: ICD-10-CM

## 2022-09-20 DIAGNOSIS — Z95.5 S/P CORONARY ARTERY STENT PLACEMENT: ICD-10-CM

## 2022-09-20 DIAGNOSIS — Z99.2 CONTROLLED TYPE 2 DIABETES MELLITUS WITH CHRONIC KIDNEY DISEASE ON CHRONIC DIALYSIS, WITHOUT LONG-TERM CURRENT USE OF INSULIN (HCC): ICD-10-CM

## 2022-09-20 DIAGNOSIS — I10 HYPERTENSION, UNSPECIFIED TYPE: ICD-10-CM

## 2022-09-20 PROCEDURE — 99214 OFFICE O/P EST MOD 30 MIN: CPT | Performed by: NURSE PRACTITIONER

## 2022-09-20 RX ORDER — SEVELAMER HYDROCHLORIDE 800 MG/1
800 TABLET, FILM COATED ORAL
Status: ON HOLD | COMMUNITY
End: 2022-10-19

## 2022-09-20 ASSESSMENT — ENCOUNTER SYMPTOMS
SHORTNESS OF BREATH: 0
ROS GI COMMENTS: DECREASED APPETITE
PND: 0
ORTHOPNEA: 0
COUGH: 0
DIZZINESS: 0
PALPITATIONS: 0
CLAUDICATION: 0
FEVER: 0
MYALGIAS: 0
ABDOMINAL PAIN: 1

## 2022-09-20 ASSESSMENT — FIBROSIS 4 INDEX: FIB4 SCORE: 2.569456219350291778

## 2022-09-20 NOTE — PROGRESS NOTES
Chief Complaint   Patient presents with    Atrial Fibrillation     F/V Dx: Paroxysmal A-fib (HCC)      Coronary Artery Disease     F/V Dx: Coronary artery disease involving native heart without angina pectoris       Subjective     Tere Gallegos is a 71 y.o. female who presents today for follow up with her CAD, Afib and hypertension with her daughter-in-law.    Patient of Dr. Pettit.  She was last seen in clinic on 2/3/2022.  During that visit, she was recommended to restart her statin.    Had a recent ER for stomach problems. Had some med adjustments.  Patient also had another hospitalization within the month for atrial fibrillation.  Patient was recommended to start carvedilol after she received diltiazem.    She comes in the office today reporting having some abdominal discomfort, though it has improved some with medication changes.  She also mentions having decreased appetite.    She denies chest pain, palpitations, apnea, PND, edema, shortness of breath or dizziness/lightheadedness.  Also denying headaches.    She reports today that she has not been taking some of her medications due to her stomach upset today.  She did take her hydralazine, but has missed her carvedilol, lisinopril and amlodipine.    She had reported some dizziness to another provider and they recommended her to cut her carvedilol dose in half, but patient reports today that she continues to take full tablet and is feeling better.    Gets dialysis Mondays, Wednesdays, Fridays.    She is not able to take her blood pressures at home.    She is on the transplant list for kidney.    Additonally, patient has the following medical problems:     -CAD:  RCA with left-to-right collateral; NOEMI ×2 to RCA on 9/7/16     -Left kidney cancer     -End-stage renal disease: Receiving dialysis M/W/F     -Hypertension     -Hyperlipidemia     -Diabetes    Past Medical History:   Diagnosis Date    Acquired hypothyroidism 5/4/2020    CAD (coronary  artery disease)     Chronic diastolic heart failure ([facility]) 5/4/2020    Coronary artery disease due to lipid rich plaque     2 Synergy NOEMI to 100% RCA stent placed    Dental disorder     partial dentures- uppers    Diabetes ([facility])     oral medication    ESRD (end stage renal disease) on dialysis ([facility]) 5/4/2020    Hemodialysis patient ([facility])     M, W, F    Hyperlipidemia     Hypertension     Kidney transplant candidate     Presence of drug-eluting stent in right coronary artery     QT prolongation 1/22/2020    RLS (restless legs syndrome) 8/5/2016    Transaminitis 12/22/2018     Past Surgical History:   Procedure Laterality Date    ZZZ CARDIAC CATH  9/7/2016    RCA stented with 2 Synergy drug-eluting stents.    RECOVERY  8/16/2016    Procedure: CATH LAB [facility] WITH POSSIBLE DR. CASTILLO;  Surgeon: [facility];  Location: SURGERY PRE-POST PROC UNIT [facility] [place];  Service:     ZZZ CARDIAC CATH  8/16/16    100% RCA    OTHER Left 2014    left arm upper extremity fistula    OTHER ABDOMINAL SURGERY      left kidney removed due to cancer     Family History   Problem Relation Age of Onset    Diabetes Sister     Other Sister         liver disease    Diabetes Brother     Heart Disease Neg Hx      Social History     Socioeconomic History    Marital status:      Spouse name: Not on file    Number of children: Not on file    Years of education: Not on file    Highest education level: Not on file   Occupational History    Not on file   Tobacco Use    Smoking status: Never    Smokeless tobacco: Never   Vaping Use    Vaping Use: Never used   Substance and Sexual Activity    Alcohol use: No     Alcohol/week: 0.0 oz    Drug use: No    Sexual activity: Never   Other Topics Concern    Not on file   Social History Narrative    Not on file     Social Determinants of Health     Financial Resource Strain: Not on file   Food Insecurity: Not on file   Transportation Needs: Not on file   Physical Activity: Not on file   Stress: Not on file    Social Connections: Not on file   Intimate Partner Violence: Not on file   Housing Stability: Not on file     No Known Allergies  Outpatient Encounter Medications as of 9/20/2022   Medication Sig Dispense Refill    sevelamer (RENAGEL) 800 MG Tab Take 800 mg by mouth 3 times a day with meals.      aspirin EC (ECOTRIN) 81 MG Tablet Delayed Response Take 81 mg by mouth every day.      famotidine (PEPCID) 20 MG Tab Take 1 Tablet by mouth 2 times a day. 30 Tablet 0    sucralfate (CARAFATE) 1 GM Tab Take 1 Tablet by mouth 3 times a day before meals for 7 days. 21 Tablet 0    Blood Glucose Meter Kit Test blood sugar as recommended by provider. Freestyle Pearl blood glucose monitoring kit. 1 Kit 0    Blood Glucose Test Strips Use one Freestyle Pearl strip to test blood sugar once daily . 100 Strip 0    Lancets Use one Freestyle Pearl lancet to test blood sugar once daily . 100 Each 0    Alcohol Swabs Wipe site with prep pad prior to injection. 100 Each 0    hydrALAZINE (APRESOLINE) 100 MG tablet Take 1 Tablet by mouth 2 times a day. 180 Tablet 3    amLODIPine (NORVASC) 10 MG Tab Take 1 Tablet by mouth every day. 90 Tablet 3    levothyroxine (SYNTHROID) 50 MCG Tab TOME FERNANDO TABLETA POR VIA ORAL CADA MANANA ON AN EMPTY STOMACH 90 Tablet 3    gabapentin (NEURONTIN) 100 MG Cap Take one capsule by mouth after dialysis and nightly before bed. (Patient taking differently: Take 200 mg by mouth 2 times a day. Take one capsule by mouth after dialysis and nightly before bed.) 126 Capsule 3    carvedilol (COREG) 3.125 MG Tab Take 1 Tablet by mouth 2 times a day with meals. 60 Tablet 0    apixaban (ELIQUIS) 2.5mg Tab Take 1 Tablet by mouth 2 times a day. Indications: Thromboembolism secondary to Atrial Fibrillation 180 Tablet 3    ROPINIRole (REQUIP) 1 MG Tab Take 1 Tablet by mouth 2 times a day. 180 Tablet 1    lisinopril (PRINIVIL) 40 MG tablet TOME FERNANDO TABLETA TODOS LOS MCCORMICK (Patient taking differently: Take 40 mg by mouth every  "day.) 90 Tablet 3    pioglitazone (ACTOS) 30 MG Tab TOME FERNANDO TABLETA TODOS LOS MCCORMICK (Patient taking differently: Take 30 mg by mouth every day.) 90 Tablet 3    gabapentin (NEURONTIN) 100 MG Cap Take 1 Capsule by mouth 3 times a day. (Patient not taking: Reported on 9/20/2022) 90 Capsule 0    [DISCONTINUED] mag hydrox-al hydrox-simeth (MAALOX PLUS ES OR MYLANTA DS) 400-400-40 MG/5ML Suspension Take 10 mL by mouth 4 times a day as needed (epigastric pain). (Patient not taking: Reported on 9/20/2022) 560 mL 0     No facility-administered encounter medications on file as of 9/20/2022.     Review of Systems   Constitutional:  Negative for fever and malaise/fatigue.   Respiratory:  Negative for cough and shortness of breath.    Cardiovascular:  Negative for chest pain, palpitations, orthopnea, claudication, leg swelling and PND.   Gastrointestinal:  Positive for abdominal pain.        Decreased appetite   Musculoskeletal:  Negative for myalgias.   Neurological:  Negative for dizziness.   All other systems reviewed and are negative.           Objective     BP (!) 188/60 (BP Location: Left arm, Patient Position: Sitting, BP Cuff Size: Adult)   Pulse 72   Resp 14   Ht 1.6 m (5' 3\")   Wt 67.1 kg (148 lb)   LMP  (LMP Unknown)   SpO2 95%   BMI 26.22 kg/m²     Physical Exam  Vitals reviewed.   Constitutional:       Appearance: She is well-developed.   HENT:      Head: Normocephalic and atraumatic.   Eyes:      Pupils: Pupils are equal, round, and reactive to light.   Neck:      Vascular: No JVD.   Cardiovascular:      Rate and Rhythm: Normal rate. Rhythm irregular.      Heart sounds: Normal heart sounds.   Pulmonary:      Effort: Pulmonary effort is normal. No respiratory distress.      Breath sounds: Normal breath sounds. No wheezing or rales.   Abdominal:      General: Bowel sounds are normal.      Palpations: Abdomen is soft.   Musculoskeletal:         General: Normal range of motion.      Cervical back: Normal range " of motion and neck supple.      Right lower leg: No edema.      Left lower leg: No edema.   Skin:     General: Skin is warm and dry.      Comments: Left arm fistula with positive bruit and thrill   Neurological:      General: No focal deficit present.      Mental Status: She is alert and oriented to person, place, and time.   Psychiatric:         Behavior: Behavior normal.     Lab Results   Component Value Date/Time    CHOLSTRLTOT 125 09/29/2020 10:56 AM    LDL 54 09/29/2020 10:56 AM    HDL 48 09/29/2020 10:56 AM    TRIGLYCERIDE 113 09/29/2020 10:56 AM       Lab Results   Component Value Date/Time    SODIUM 134 (L) 09/16/2022 10:55 PM    POTASSIUM 4.5 09/16/2022 10:55 PM    CHLORIDE 94 (L) 09/16/2022 10:55 PM    CO2 31 09/16/2022 10:55 PM    GLUCOSE 106 (H) 09/16/2022 10:55 PM    BUN 21 09/16/2022 10:55 PM    CREATININE 4.01 (H) 09/16/2022 10:55 PM    BUNCREATRAT 8 (L) 01/28/2021 07:00 AM     Lab Results   Component Value Date/Time    ALKPHOSPHAT 65 09/16/2022 10:55 PM    ASTSGOT 21 09/16/2022 10:55 PM    ALTSGPT 19 09/16/2022 10:55 PM    TBILIRUBIN 0.2 09/16/2022 10:55 PM        Myocardial Perfusion Report 6/16/16   NUCLEAR IMAGING INTERPRETATION   1. Small reversible defect seen only in basal anterolateral wall with    moderate photon reduction. Attenuation defect vs reversible ischemia consider      clinical correlation.   2. Normal left ventricular size, ejection fraction, and wall motion. Normal    left ventricular wall motion, with EF of 82 %. Calculated TID 0.88.   ECG INTERPRETATION   Negative stress ECG for ischemia.       Transthoracic Echo Report 7/13/16  No prior study is available for comparison.   Normal left ventricular chamber size.  Left ventricular ejection fraction is visually estimated to be greater   than 75%.  Grade I diastolic dysfunction.  Mild mitral and tricuspid valve regurgitation.   Normal inferior vena cava size and inspiratory collapse.  Estimated right ventricular systolic pressure   is 25 mmHg.    Coronary Angiogram 9/7/16  POSTOPERATIVE DIAGNOSES:  Status post stenting of the right coronary artery   with 2.5 mm and 2.25 mmSynergy drug-eluting stents with 0% residual stenosis in   stented segment with IRIS-3 flow into moderately diffuse disease, distal right coronary artery.        Transthoracic Echo Report 10/17/17  Moderate concentric left ventricular hypertrophy.  Normal left ventricular systolic function.  Left ventricular ejection fraction is visually estimated to be 60%.  Grade II diastolic dysfunction.  Moderatelydilated left atrium.  Mild mitral regurgitation.  Structurally normal aortic valve without significant stenosis or   regurgitation.  Right ventricular systolic pressure is estimated to be 65 mmHg.  Small pericardial effusion without evidence of hemodynamic compromise.       Myocardial Perfusion Report 10/17/17   NUCLEAR IMAGING INTERPRETATION   Normal myocardial perfusion with no ischemia.   Normal left ventricular wall motion.  LV ejection fraction = 61%.   ECG INTERPRETATION   Negative stress ECG for ischemia.     Transthoracic Echo Report 12/23/2018  Prior echo 10/17/17 no significant change.  Moderate to severe left ventricular hypertrophy with grade 2 diastolic   dysfunction.  Severe left atrial enlargement  Left ventricular systolic function.  Severe pulmonary hypertension with mildly dilated right ventricle.  Paradoxical septal motion.  Small pericardial effusion.  Consider restrictive cardiomyopathy in appropriate clinical setting.     Transthoracic Echo Report 1/23/2020  Compared to the images of the study done 12/23/18, RVSP previously severely elevated, could not be assessed on the study, otherwise no significant change.  Left ventricular ejection fraction is visually estimated to be 60%.  Mild concentric left ventricular hypertrophy.  Grade II diastolic dysfunction.  Moderately dilated left atrium.  Mild mitral regurgitation.  Unable to estimate pulmonary artery  pressure due to an inadequate   tricuspid regurgitant jet.  Trivial/physiologic pericardial fluid.     Myocardial Perfusion Report 10/20/2020   NUCLEAR IMAGING INTERPRETATION   Normal left ventricular perfusion with no fixed or reversible defects.    Normal left ventricular wall motion, with EF of 68%.    ECG INTERPRETATION   Negative stress ECG for ischemia.     Transthoracic Echo Report 6/4/2021  Normal left ventricular systolic function.  Left ventricular ejection fraction is visually estimated to be 70-75%.  Mild concentric left ventricular hypertrophy.  Normal right ventricular size and systolic function.  No valve abnormalities.       Coronary angiogram 6/8/2021  PREOPERATIVE DIAGNOSIS:  1.  ESRD pending renal transplantation  2.  Prerenal transplant evaluation  3.  CAD status post prior RCA PCI     POSTOPERATIVE DIAGNOSIS:  1.  Nonobstructive coronary artery disease.  2.  Widely patent previously placed RCA stents  3.  LVEF 70%, LVEDP 22 mmHg     PROCEDURE PERFORMED:  Selective coronary angiography of the native vessels  Left heart catheterization  Left ventriculogram      Transthoracic Echo Report 8/12/2021  Normal left ventricular size, wall thickness, and systolic function.  Mildly dilated left atrium.  Mitral annular calcification.  Aortic sclerosis without stenosis.  No pericardial effusion seen.     Compared to the images of the prior study done 6/4/2021, similar   findings.     Myocardial Perfusion Report 8/13/2021   NUCLEAR IMAGING INTERPRETATION   Normal left ventricular size, ejection fraction, and wall motion.   No evidence of significant jeopardized viable myocardium or prior myocardial infarction.   ECG INTERPRETATION   Nondiagnostic ECG portion of a regadenoson nuclear stress test.     Transthoracic Echo Report 8/24/2022  Compared to the prior study on 06/04/21.   Normal left ventricular size and systolic function.  The left ventricular ejection fraction is visually estimated to be 65%.  Normal  inferior vena cava size and inspiratory collapse.  Estimated right ventricular systolic pressure is 25 mmHg.           Assessment & Plan     1. Essential hypertension        2. Coronary artery disease due to lipid rich plaque        3. S/P coronary artery stent placement        4. Paroxysmal atrial fibrillation ([facility])        5. Dyslipidemia        6. Controlled type 2 diabetes mellitus with chronic kidney disease on chronic dialysis, without long-term current use of insulin ([facility])        7. Hypertension, unspecified type        8. Kidney transplant candidate        9. ESRD (end stage renal disease) on dialysis ([facility])            Medical Decision Making: Today's Assessment/Status/Plan:        Hypertension: BP elevated in the office  -Recheck 186/62  -Patient admits to not taking her amlodipine, lisinopril and carvedilol today due to stomach problems.  Discussed with patient importance of taking her medications every day  -She does have her medications here in the office today, patient took amlodipine, carvedilol and lisinopril in front of me.  -Reviewed signs and symptoms of stroke, if present, patient to call ambulance  -Reviewed low-sodium diet  -Continue amlodipine 10 mg daily  -Continue carvedilol 3.125 mg twice a day  -Continue hydralazine 100 mg twice a day  -Continue lisinopril 40 mg daily    Atrial fibrillation:  -Rate controlled  -Continue carvedilol 3.125 mg twice a day  -Continue Eliquis 2.5 mg twice a day    CAD,  to RCA, left to right collaterals; NOEMI x2 to the RCA on 9/7/2016:  -Continue atorvastatin 40 mg daily  -Continue aspirin 81 mg daily    ESRD:  -Dialysis-M, W, F  -Pending possible kidney transplant    FU in clinic in 3 months with Dr. Pettit. Sooner if needed.    Patient verbalizes understanding and agrees with the plan of care.     PLEASE NOTE: This Note was created using voice recognition Software. I have made every reasonable attempt to correct obvious errors, but I expect that there are errors  of grammar and possibly content that I did not discover before finalizing the note

## 2022-09-28 ENCOUNTER — OFFICE VISIT (OUTPATIENT)
Dept: MEDICAL GROUP | Facility: MEDICAL CENTER | Age: 72
End: 2022-09-28
Payer: MEDICARE

## 2022-09-28 VITALS
SYSTOLIC BLOOD PRESSURE: 160 MMHG | DIASTOLIC BLOOD PRESSURE: 60 MMHG | WEIGHT: 141.4 LBS | TEMPERATURE: 97.3 F | HEART RATE: 72 BPM | RESPIRATION RATE: 16 BRPM | BODY MASS INDEX: 25.05 KG/M2 | OXYGEN SATURATION: 92 % | HEIGHT: 63 IN

## 2022-09-28 DIAGNOSIS — E03.8 SUBCLINICAL HYPOTHYROIDISM: ICD-10-CM

## 2022-09-28 DIAGNOSIS — Z13.21 ENCOUNTER FOR VITAMIN DEFICIENCY SCREENING: ICD-10-CM

## 2022-09-28 DIAGNOSIS — R74.8 ELEVATED LIPASE: ICD-10-CM

## 2022-09-28 DIAGNOSIS — R63.0 POOR APPETITE: ICD-10-CM

## 2022-09-28 DIAGNOSIS — H91.91 DECREASED HEARING OF RIGHT EAR: ICD-10-CM

## 2022-09-28 DIAGNOSIS — R79.89 ELEVATED SERUM FREE T4 LEVEL: ICD-10-CM

## 2022-09-28 DIAGNOSIS — Z13.220 ENCOUNTER FOR SCREENING FOR LIPID DISORDER: ICD-10-CM

## 2022-09-28 DIAGNOSIS — G25.81 RLS (RESTLESS LEGS SYNDROME): ICD-10-CM

## 2022-09-28 PROCEDURE — 99214 OFFICE O/P EST MOD 30 MIN: CPT | Performed by: NURSE PRACTITIONER

## 2022-09-28 ASSESSMENT — FIBROSIS 4 INDEX: FIB4 SCORE: 2.569456219350291778

## 2022-09-28 NOTE — ASSESSMENT & PLAN NOTE
Ongoing for about 6 months, hearing started to worsen and then she started to hear a buzzing sound. She is only hearing the buzzing sound when she lays down at night and during the day when she lays down.     No ear pain, drainage, no history of ear infections.     Her family member does recall that she was complaining of ear pain around the time that this happened.

## 2022-09-28 NOTE — ASSESSMENT & PLAN NOTE
Ongoing for a few months. Doesn't feel like she has an appetite. She will force herself to eat but she will chew her food and it doesn't want to go down. She has tried using water to help food go down. She doesn't have issues with regurgitation of food or food getting stuck.     She just isn't feeling hungry. Sometimes she will eat, then other days she will eat. Doesn't seem to relate to dialysis.     She does feel depressed sometimes but not often, not daily.

## 2022-09-28 NOTE — ASSESSMENT & PLAN NOTE
Chronic. Significantly improved with recent trial of gabapentin 100 mg nightly and 100 mg prior to diolaysis.

## 2022-10-04 ENCOUNTER — ANTICOAGULATION VISIT (OUTPATIENT)
Dept: VASCULAR LAB | Facility: MEDICAL CENTER | Age: 72
End: 2022-10-04
Attending: INTERNAL MEDICINE
Payer: MEDICARE

## 2022-10-04 DIAGNOSIS — I48.0 PAROXYSMAL A-FIB (HCC): ICD-10-CM

## 2022-10-04 DIAGNOSIS — D68.69 SECONDARY HYPERCOAGULABLE STATE (HCC): ICD-10-CM

## 2022-10-04 LAB — INR PPP: 1.1 (ref 2–3.5)

## 2022-10-04 PROCEDURE — 99212 OFFICE O/P EST SF 10 MIN: CPT

## 2022-10-04 PROCEDURE — 85610 PROTHROMBIN TIME: CPT

## 2022-10-04 NOTE — PROGRESS NOTES
Anticoagulation Summary  As of 10/4/2022      INR goal:     TTR:  --   INR used for dosin.10 (10/4/2022)   Warfarin maintenance plan:  No maintenance plan   Next INR check:  2022   Target end date:  Indefinite    Indications    Paroxysmal A-fib (HCC) [I48.0]  Secondary hypercoagulable state (HCC) [D68.69]                 Anticoagulation Episode Summary       INR check location:      Preferred lab:      Send INR reminders to:      Comments:            Anticoagulation Care Providers       Provider Role Specialty Phone number    VASQUEZ Dow Referring Cardiovascular Disease (Cardiology) [phone]    [facility] Responsible  [phone]          Anticoagulation Patient Findings  Patient Findings       Negatives:  Signs/symptoms of thrombosis, Signs/symptoms of bleeding, Laboratory test error suspected, Change in health, Change in alcohol use, Change in activity, Upcoming invasive procedure, Emergency department visit, Upcoming dental procedure, Missed doses, Extra doses, Change in medications, Change in diet/appetite, Hospital admission, Bruising, Other complaints                  Anticoagulation Patient Findings  Health Status Since Last Assessment  Any new relevant medical problems, ED visits/hospitalizations pertinent to this visit: No   Any embolic events (stroke / TIA / systemic embolism): No  Any recent deep vein thrombosis or pulmonary emboli: No     Adherence with DOAC Therapy  1 or more missed doses in an average week: No   If yes, number of missed doses- n/a  BLEEDING RISK ASSESSMENT NB:     Bleeding Risk Assessment  Severe epistaxis: No  Hemoptysis: No  Excessive or unusual bruising / hematomas: No  GIB / melena / BRBPR / hematemesis: No  Hematuria: No  Concerning daily headache or subdural hematoma symptoms: No  Decreasing hemoglobin or new anemia: No  EtOH overuse: No  Falls, presyncope, syncope, or seizures: No  Uncontrolled hypertension: No  LFTs   Lab Results    Component Value Date/Time    ALKPHOSPHAT 65 09/16/2022 10:55 PM    ASTSGOT 21 09/16/2022 10:55 PM    ALTSGPT 19 09/16/2022 10:55 PM    TBILIRUBIN 0.2 09/16/2022 10:55 PM    ALBUMIN 3.9 09/16/2022 10:55 PM    INR 1.10 (A) 10/04/2022 12:00 AM      Latest hemoglobin:  Lab Results   Component Value Date/Time    WBC 2.9 (L) 09/16/2022 10:55 PM    RBC 3.15 (L) 09/16/2022 10:55 PM    HEMOGLOBIN 10.0 (L) 09/16/2022 10:55 PM    HEMATOCRIT 30.6 (L) 09/16/2022 10:55 PM    PLATELETCT 135 (L) 09/16/2022 10:55 PM        Latest Reference Range & Units 8/4/22 16:20 8/24/22 11:40 8/25/22 06:42 9/1/22 05:03 9/16/22 22:55   Hemoglobin 12.0 - 16.0 g/dL 13.0 (E) 12.8 10.7 (L) 11.1 (L) 10.0 (L)   Hematocrit 37.0 - 47.0 % 39.9 (E) 39.0 33.4 (L) 34.3 (L) 30.6 (L)   (L): Data is abnormally low  (E): External lab result    Creatinine Clearance/Renal Function  Latest eGFR / creatinine:  Lab Results   Component Value Date/Time    SODIUM 134 (L) 09/16/2022 10:55 PM    POTASSIUM 4.5 09/16/2022 10:55 PM    CHLORIDE 94 (L) 09/16/2022 10:55 PM    CO2 31 09/16/2022 10:55 PM    GLUCOSE 106 (H) 09/16/2022 10:55 PM    BUN 21 09/16/2022 10:55 PM    CREATININE 4.01 (H) 09/16/2022 10:55 PM         Is eGFR/CrCl less than 50ml/min:  <30     Drug Interactions  is pt on antiplatelet therapy: yes  Indication: Drug-eluting stent in the right coronary artery  This should be reviewed again by cardiology at subsequent appointments.     Is pt on NSAID: No  Other drug interactions: Increase bleed risk with aspirin and Eliquis    Current Outpatient Medications:     sevelamer, 800 mg, Oral, TID WITH MEALS    aspirin EC, 81 mg, Oral, DAILY    famotidine, 20 mg, Oral, BID    Blood Glucose Meter Kit, Test blood sugar as recommended by provider. Freestyle Pearl blood glucose monitoring kit.    Blood Glucose Test Strips, Use one Freestyle Pearl strip to test blood sugar once daily .    Lancets, Use one Freestyle Pearl lancet to test blood sugar once daily .    Alcohol  Swabs, Wipe site with prep pad prior to injection.    hydrALAZINE, 100 mg, Oral, BID    amLODIPine, 10 mg, Oral, DAILY    levothyroxine, TOME FERNANDO TABLETA POR VIA ORAL CADA MANANA ON AN EMPTY STOMACH    carvedilol, 3.125 mg, Oral, BID WITH MEALS    apixaban, 2.5 mg, Oral, BID    ROPINIRole, 1 mg, Oral, BID    lisinopril, TOME FERNANDO TABLETA TODOS [place] MCCORMICK (Patient taking differently: 40 mg, Oral, DAILY, Other)    pioglitazone, TOME FERNANDO TABLETA TODOS LOS MCCORMICK (Patient taking differently: 30 mg, Oral, DAILY, Other)       Final Assessment and Recommendations:  Patient appears stable from the anticoagulation standpoint.   Benefits of continued DOAC therapy outweigh risks for this patient.   Continue with Eliquis 2.5 mg twice daily.    Provider continues to monitor hemoglobin and hematocrit as this has dropped but she does have renal dysfunction and is on dialysis     Follow:  Our protocol suggests we test in 12 weeks.    This decision was made using shared decision making with the pt and benefits vs risks were discussed.       Jorge Brower, PharmD, MS, BCACP, [facility]  Phone [phone] fax [phone]    This note was created using voice recognition software (Dragon). The accuracy of the dictation is limited by the abilities of the software. I have reviewed the note prior to signing, however some errors in grammar and context are still possible. If you have any questions related to this note please do not hesitate to contact our office.

## 2022-10-05 LAB — INR BLD: 1.1 (ref 0.9–1.2)

## 2022-10-19 ENCOUNTER — HOSPITAL ENCOUNTER (INPATIENT)
Facility: MEDICAL CENTER | Age: 72
LOS: 4 days | DRG: 291 | End: 2022-10-23
Attending: STUDENT IN AN ORGANIZED HEALTH CARE EDUCATION/TRAINING PROGRAM | Admitting: INTERNAL MEDICINE
Payer: MEDICARE

## 2022-10-19 ENCOUNTER — APPOINTMENT (OUTPATIENT)
Dept: RADIOLOGY | Facility: MEDICAL CENTER | Age: 72
DRG: 291 | End: 2022-10-19
Attending: STUDENT IN AN ORGANIZED HEALTH CARE EDUCATION/TRAINING PROGRAM
Payer: MEDICARE

## 2022-10-19 ENCOUNTER — APPOINTMENT (OUTPATIENT)
Dept: RADIOLOGY | Facility: MEDICAL CENTER | Age: 72
DRG: 291 | End: 2022-10-19
Attending: INTERNAL MEDICINE
Payer: MEDICARE

## 2022-10-19 DIAGNOSIS — J96.01 ACUTE RESPIRATORY FAILURE WITH HYPOXIA (HCC): ICD-10-CM

## 2022-10-19 DIAGNOSIS — I16.1 HYPERTENSIVE EMERGENCY: ICD-10-CM

## 2022-10-19 DIAGNOSIS — I50.33 ACUTE ON CHRONIC DIASTOLIC CONGESTIVE HEART FAILURE (HCC): ICD-10-CM

## 2022-10-19 DIAGNOSIS — N18.6 ESRD (END STAGE RENAL DISEASE) ON DIALYSIS (HCC): ICD-10-CM

## 2022-10-19 DIAGNOSIS — N18.6 ESRD (END STAGE RENAL DISEASE) (HCC): ICD-10-CM

## 2022-10-19 DIAGNOSIS — R09.02 HYPOXEMIA: ICD-10-CM

## 2022-10-19 DIAGNOSIS — Z99.2 ESRD (END STAGE RENAL DISEASE) ON DIALYSIS (HCC): ICD-10-CM

## 2022-10-19 DIAGNOSIS — J81.0 ACUTE PULMONARY EDEMA (HCC): ICD-10-CM

## 2022-10-19 DIAGNOSIS — E87.70 HYPERVOLEMIA, UNSPECIFIED HYPERVOLEMIA TYPE: ICD-10-CM

## 2022-10-19 PROBLEM — K21.9 GERD (GASTROESOPHAGEAL REFLUX DISEASE): Status: ACTIVE | Noted: 2022-10-19

## 2022-10-19 LAB
ALBUMIN SERPL BCP-MCNC: 4.5 G/DL (ref 3.2–4.9)
ALBUMIN/GLOB SERPL: 1.6 G/DL
ALP SERPL-CCNC: 73 U/L (ref 30–99)
ALT SERPL-CCNC: 13 U/L (ref 2–50)
ANION GAP SERPL CALC-SCNC: 17 MMOL/L (ref 7–16)
AST SERPL-CCNC: 21 U/L (ref 12–45)
BASOPHILS # BLD AUTO: 0.3 % (ref 0–1.8)
BASOPHILS # BLD: 0.01 K/UL (ref 0–0.12)
BILIRUB SERPL-MCNC: 0.7 MG/DL (ref 0.1–1.5)
BLOOD CULTURE HOLD CXBCH: NORMAL
BLOOD CULTURE HOLD CXBCH: NORMAL
BUN SERPL-MCNC: 28 MG/DL (ref 8–22)
CALCIUM SERPL-MCNC: 10.4 MG/DL (ref 8.4–10.2)
CHLORIDE SERPL-SCNC: 94 MMOL/L (ref 96–112)
CO2 SERPL-SCNC: 28 MMOL/L (ref 20–33)
CREAT SERPL-MCNC: 6.93 MG/DL (ref 0.5–1.4)
EKG IMPRESSION: NORMAL
EOSINOPHIL # BLD AUTO: 0.04 K/UL (ref 0–0.51)
EOSINOPHIL NFR BLD: 1.2 % (ref 0–6.9)
ERYTHROCYTE [DISTWIDTH] IN BLOOD BY AUTOMATED COUNT: 46.2 FL (ref 35.9–50)
FLUAV RNA SPEC QL NAA+PROBE: NEGATIVE
FLUBV RNA SPEC QL NAA+PROBE: NEGATIVE
GFR SERPLBLD CREATININE-BSD FMLA CKD-EPI: 6 ML/MIN/1.73 M 2
GLOBULIN SER CALC-MCNC: 2.8 G/DL (ref 1.9–3.5)
GLUCOSE SERPL-MCNC: 125 MG/DL (ref 65–99)
HAV IGM SERPL QL IA: NORMAL
HBV CORE IGM SER QL: NORMAL
HBV SURFACE AB SERPL IA-ACNC: 423 MIU/ML (ref 0–10)
HBV SURFACE AG SER QL: NORMAL
HCT VFR BLD AUTO: 28.3 % (ref 37–47)
HCV AB SER QL: NORMAL
HGB BLD-MCNC: 9.3 G/DL (ref 12–16)
IMM GRANULOCYTES # BLD AUTO: 0.01 K/UL (ref 0–0.11)
IMM GRANULOCYTES NFR BLD AUTO: 0.3 % (ref 0–0.9)
LIPASE SERPL-CCNC: 51 U/L (ref 7–58)
LYMPHOCYTES # BLD AUTO: 0.3 K/UL (ref 1–4.8)
LYMPHOCYTES NFR BLD: 9.2 % (ref 22–41)
MCH RBC QN AUTO: 31.6 PG (ref 27–33)
MCHC RBC AUTO-ENTMCNC: 32.9 G/DL (ref 33.6–35)
MCV RBC AUTO: 96.3 FL (ref 81.4–97.8)
MONOCYTES # BLD AUTO: 0.25 K/UL (ref 0–0.85)
MONOCYTES NFR BLD AUTO: 7.7 % (ref 0–13.4)
NEUTROPHILS # BLD AUTO: 2.65 K/UL (ref 2–7.15)
NEUTROPHILS NFR BLD: 81.3 % (ref 44–72)
NRBC # BLD AUTO: 0 K/UL
NRBC BLD-RTO: 0 /100 WBC
PLATELET # BLD AUTO: 128 K/UL (ref 164–446)
PMV BLD AUTO: 10.8 FL (ref 9–12.9)
POTASSIUM SERPL-SCNC: 3.8 MMOL/L (ref 3.6–5.5)
PROT SERPL-MCNC: 7.3 G/DL (ref 6–8.2)
RBC # BLD AUTO: 2.94 M/UL (ref 4.2–5.4)
RSV RNA SPEC QL NAA+PROBE: NEGATIVE
SARS-COV-2 RNA RESP QL NAA+PROBE: NOTDETECTED
SODIUM SERPL-SCNC: 139 MMOL/L (ref 135–145)
SPECIMEN SOURCE: NORMAL
WBC # BLD AUTO: 3.3 K/UL (ref 4.8–10.8)

## 2022-10-19 PROCEDURE — 71045 X-RAY EXAM CHEST 1 VIEW: CPT

## 2022-10-19 PROCEDURE — 90935 HEMODIALYSIS ONE EVALUATION: CPT

## 2022-10-19 PROCEDURE — 80053 COMPREHEN METABOLIC PANEL: CPT

## 2022-10-19 PROCEDURE — A9270 NON-COVERED ITEM OR SERVICE: HCPCS | Performed by: INTERNAL MEDICINE

## 2022-10-19 PROCEDURE — 80074 ACUTE HEPATITIS PANEL: CPT

## 2022-10-19 PROCEDURE — A9270 NON-COVERED ITEM OR SERVICE: HCPCS | Performed by: STUDENT IN AN ORGANIZED HEALTH CARE EDUCATION/TRAINING PROGRAM

## 2022-10-19 PROCEDURE — 85025 COMPLETE CBC W/AUTO DIFF WBC: CPT

## 2022-10-19 PROCEDURE — C9803 HOPD COVID-19 SPEC COLLECT: HCPCS | Performed by: STUDENT IN AN ORGANIZED HEALTH CARE EDUCATION/TRAINING PROGRAM

## 2022-10-19 PROCEDURE — 700102 HCHG RX REV CODE 250 W/ 637 OVERRIDE(OP): Performed by: INTERNAL MEDICINE

## 2022-10-19 PROCEDURE — 99285 EMERGENCY DEPT VISIT HI MDM: CPT

## 2022-10-19 PROCEDURE — 99223 1ST HOSP IP/OBS HIGH 75: CPT | Performed by: INTERNAL MEDICINE

## 2022-10-19 PROCEDURE — 0241U HCHG SARS-COV-2 COVID-19 NFCT DS RESP RNA 4 TRGT MIC: CPT

## 2022-10-19 PROCEDURE — 99223 1ST HOSP IP/OBS HIGH 75: CPT | Mod: AI | Performed by: INTERNAL MEDICINE

## 2022-10-19 PROCEDURE — 83690 ASSAY OF LIPASE: CPT

## 2022-10-19 PROCEDURE — 770006 HCHG ROOM/CARE - MED/SURG/GYN SEMI*

## 2022-10-19 PROCEDURE — 700102 HCHG RX REV CODE 250 W/ 637 OVERRIDE(OP): Performed by: STUDENT IN AN ORGANIZED HEALTH CARE EDUCATION/TRAINING PROGRAM

## 2022-10-19 PROCEDURE — 5A1D80Z PERFORMANCE OF URINARY FILTRATION, PROLONGED INTERMITTENT, 6-18 HOURS PER DAY: ICD-10-PCS | Performed by: INTERNAL MEDICINE

## 2022-10-19 PROCEDURE — 700111 HCHG RX REV CODE 636 W/ 250 OVERRIDE (IP): Performed by: INTERNAL MEDICINE

## 2022-10-19 PROCEDURE — 96375 TX/PRO/DX INJ NEW DRUG ADDON: CPT

## 2022-10-19 PROCEDURE — 93005 ELECTROCARDIOGRAM TRACING: CPT | Performed by: STUDENT IN AN ORGANIZED HEALTH CARE EDUCATION/TRAINING PROGRAM

## 2022-10-19 PROCEDURE — 94760 N-INVAS EAR/PLS OXIMETRY 1: CPT

## 2022-10-19 PROCEDURE — 96374 THER/PROPH/DIAG INJ IV PUSH: CPT

## 2022-10-19 PROCEDURE — 86706 HEP B SURFACE ANTIBODY: CPT

## 2022-10-19 PROCEDURE — 700111 HCHG RX REV CODE 636 W/ 250 OVERRIDE (IP): Performed by: STUDENT IN AN ORGANIZED HEALTH CARE EDUCATION/TRAINING PROGRAM

## 2022-10-19 PROCEDURE — 36415 COLL VENOUS BLD VENIPUNCTURE: CPT

## 2022-10-19 RX ORDER — BISACODYL 10 MG
10 SUPPOSITORY, RECTAL RECTAL
Status: DISCONTINUED | OUTPATIENT
Start: 2022-10-19 | End: 2022-10-23 | Stop reason: HOSPADM

## 2022-10-19 RX ORDER — AMOXICILLIN 250 MG
2 CAPSULE ORAL 2 TIMES DAILY
Status: DISCONTINUED | OUTPATIENT
Start: 2022-10-19 | End: 2022-10-23 | Stop reason: HOSPADM

## 2022-10-19 RX ORDER — ONDANSETRON 2 MG/ML
4 INJECTION INTRAMUSCULAR; INTRAVENOUS EVERY 4 HOURS PRN
Status: DISCONTINUED | OUTPATIENT
Start: 2022-10-19 | End: 2022-10-23 | Stop reason: HOSPADM

## 2022-10-19 RX ORDER — SEVELAMER HYDROCHLORIDE 800 MG/1
800 TABLET, FILM COATED ORAL
Status: DISCONTINUED | OUTPATIENT
Start: 2022-10-19 | End: 2022-10-19

## 2022-10-19 RX ORDER — ACETAMINOPHEN 325 MG/1
650 TABLET ORAL EVERY 6 HOURS PRN
Status: DISCONTINUED | OUTPATIENT
Start: 2022-10-19 | End: 2022-10-23 | Stop reason: HOSPADM

## 2022-10-19 RX ORDER — HYDRALAZINE HYDROCHLORIDE 25 MG/1
100 TABLET, FILM COATED ORAL 2 TIMES DAILY
Status: DISCONTINUED | OUTPATIENT
Start: 2022-10-19 | End: 2022-10-23 | Stop reason: HOSPADM

## 2022-10-19 RX ORDER — SEVELAMER CARBONATE 800 MG/1
800 TABLET, FILM COATED ORAL
Status: DISCONTINUED | OUTPATIENT
Start: 2022-10-19 | End: 2022-10-23 | Stop reason: HOSPADM

## 2022-10-19 RX ORDER — ONDANSETRON 4 MG/1
8 TABLET, ORALLY DISINTEGRATING ORAL ONCE
Status: COMPLETED | OUTPATIENT
Start: 2022-10-19 | End: 2022-10-19

## 2022-10-19 RX ORDER — ONDANSETRON 4 MG/1
4 TABLET, ORALLY DISINTEGRATING ORAL EVERY 4 HOURS PRN
Status: DISCONTINUED | OUTPATIENT
Start: 2022-10-19 | End: 2022-10-23 | Stop reason: HOSPADM

## 2022-10-19 RX ORDER — CARVEDILOL 3.12 MG/1
3.12 TABLET ORAL 2 TIMES DAILY WITH MEALS
Status: DISCONTINUED | OUTPATIENT
Start: 2022-10-19 | End: 2022-10-23 | Stop reason: HOSPADM

## 2022-10-19 RX ORDER — AMLODIPINE BESYLATE 5 MG/1
10 TABLET ORAL DAILY
Status: DISCONTINUED | OUTPATIENT
Start: 2022-10-19 | End: 2022-10-23 | Stop reason: HOSPADM

## 2022-10-19 RX ORDER — LABETALOL HYDROCHLORIDE 5 MG/ML
10 INJECTION, SOLUTION INTRAVENOUS EVERY 4 HOURS PRN
Status: DISCONTINUED | OUTPATIENT
Start: 2022-10-19 | End: 2022-10-23 | Stop reason: HOSPADM

## 2022-10-19 RX ORDER — LISINOPRIL 20 MG/1
40 TABLET ORAL DAILY
Status: DISCONTINUED | OUTPATIENT
Start: 2022-10-19 | End: 2022-10-23 | Stop reason: HOSPADM

## 2022-10-19 RX ORDER — HEPARIN SODIUM 1000 [USP'U]/ML
1500 INJECTION, SOLUTION INTRAVENOUS; SUBCUTANEOUS
Status: DISCONTINUED | OUTPATIENT
Start: 2022-10-19 | End: 2022-10-23 | Stop reason: HOSPADM

## 2022-10-19 RX ORDER — ROPINIROLE 0.5 MG/1
1 TABLET, FILM COATED ORAL 2 TIMES DAILY
Status: DISCONTINUED | OUTPATIENT
Start: 2022-10-19 | End: 2022-10-23 | Stop reason: HOSPADM

## 2022-10-19 RX ORDER — FAMOTIDINE 20 MG/1
20 TABLET, FILM COATED ORAL 2 TIMES DAILY
Status: DISCONTINUED | OUTPATIENT
Start: 2022-10-19 | End: 2022-10-23 | Stop reason: HOSPADM

## 2022-10-19 RX ORDER — LEVOTHYROXINE SODIUM 0.05 MG/1
50 TABLET ORAL
Status: DISCONTINUED | OUTPATIENT
Start: 2022-10-19 | End: 2022-10-23 | Stop reason: HOSPADM

## 2022-10-19 RX ORDER — FAMOTIDINE 20 MG/1
20 TABLET, FILM COATED ORAL ONCE
Status: COMPLETED | OUTPATIENT
Start: 2022-10-19 | End: 2022-10-19

## 2022-10-19 RX ORDER — POLYETHYLENE GLYCOL 3350 17 G/17G
1 POWDER, FOR SOLUTION ORAL
Status: DISCONTINUED | OUTPATIENT
Start: 2022-10-19 | End: 2022-10-23 | Stop reason: HOSPADM

## 2022-10-19 RX ADMIN — HEPARIN SODIUM 1500 UNITS: 1000 INJECTION, SOLUTION INTRAVENOUS; SUBCUTANEOUS at 06:59

## 2022-10-19 RX ADMIN — FAMOTIDINE 20 MG: 20 TABLET, FILM COATED ORAL at 16:57

## 2022-10-19 RX ADMIN — ROPINIROLE HYDROCHLORIDE 1 MG: 0.5 TABLET, FILM COATED ORAL at 10:41

## 2022-10-19 RX ADMIN — AMLODIPINE BESYLATE 10 MG: 5 TABLET ORAL at 10:41

## 2022-10-19 RX ADMIN — APIXABAN 2.5 MG: 2.5 TABLET, FILM COATED ORAL at 10:42

## 2022-10-19 RX ADMIN — CARVEDILOL 3.12 MG: 3.12 TABLET, FILM COATED ORAL at 10:42

## 2022-10-19 RX ADMIN — LEVOTHYROXINE SODIUM 50 MCG: 50 TABLET ORAL at 10:42

## 2022-10-19 RX ADMIN — ASPIRIN 81 MG: 81 TABLET, COATED ORAL at 06:11

## 2022-10-19 RX ADMIN — ROPINIROLE HYDROCHLORIDE 1 MG: 0.5 TABLET, FILM COATED ORAL at 17:02

## 2022-10-19 RX ADMIN — CARVEDILOL 3.12 MG: 3.12 TABLET, FILM COATED ORAL at 16:59

## 2022-10-19 RX ADMIN — EPOETIN ALFA-EPBX 3000 UNITS: 3000 INJECTION, SOLUTION INTRAVENOUS; SUBCUTANEOUS at 09:18

## 2022-10-19 RX ADMIN — FAMOTIDINE 20 MG: 20 TABLET, FILM COATED ORAL at 02:18

## 2022-10-19 RX ADMIN — HYDRALAZINE HYDROCHLORIDE 100 MG: 25 TABLET, FILM COATED ORAL at 10:38

## 2022-10-19 RX ADMIN — HYDRALAZINE HYDROCHLORIDE 100 MG: 25 TABLET, FILM COATED ORAL at 17:02

## 2022-10-19 RX ADMIN — LISINOPRIL 40 MG: 20 TABLET ORAL at 10:40

## 2022-10-19 RX ADMIN — APIXABAN 2.5 MG: 2.5 TABLET, FILM COATED ORAL at 17:02

## 2022-10-19 RX ADMIN — ONDANSETRON 8 MG: 4 TABLET, ORALLY DISINTEGRATING ORAL at 02:18

## 2022-10-19 RX ADMIN — SEVELAMER CARBONATE 800 MG: 800 TABLET, FILM COATED ORAL at 16:59

## 2022-10-19 RX ADMIN — LIDOCAINE HYDROCHLORIDE 15 ML: 20 SOLUTION OROPHARYNGEAL at 02:17

## 2022-10-19 ASSESSMENT — ENCOUNTER SYMPTOMS
TINGLING: 0
FEVER: 0
BLURRED VISION: 0
DOUBLE VISION: 0
CONSTIPATION: 0
WEAKNESS: 0
SHORTNESS OF BREATH: 1
NECK PAIN: 0
LOSS OF CONSCIOUSNESS: 0
HEADACHES: 0
ABDOMINAL PAIN: 0
STRIDOR: 0
SORE THROAT: 0
PALPITATIONS: 0
MYALGIAS: 0
ABDOMINAL PAIN: 1
CHILLS: 0
NAUSEA: 1
FALLS: 0
DIZZINESS: 0
DIARRHEA: 0
SPUTUM PRODUCTION: 0
DEPRESSION: 0
COUGH: 0
VOMITING: 1

## 2022-10-19 ASSESSMENT — LIFESTYLE VARIABLES
HOW MANY TIMES IN THE PAST YEAR HAVE YOU HAD 5 OR MORE DRINKS IN A DAY: 0
HAVE YOU EVER FELT YOU SHOULD CUT DOWN ON YOUR DRINKING: NO
ALCOHOL_USE: NO
HAVE PEOPLE ANNOYED YOU BY CRITICIZING YOUR DRINKING: NO
TOTAL SCORE: 0
TOTAL SCORE: 0
EVER HAD A DRINK FIRST THING IN THE MORNING TO STEADY YOUR NERVES TO GET RID OF A HANGOVER: NO
EVER FELT BAD OR GUILTY ABOUT YOUR DRINKING: NO
TOTAL SCORE: 0
AVERAGE NUMBER OF DAYS PER WEEK YOU HAVE A DRINK CONTAINING ALCOHOL: 0
CONSUMPTION TOTAL: NEGATIVE
ON A TYPICAL DAY WHEN YOU DRINK ALCOHOL HOW MANY DRINKS DO YOU HAVE: 0

## 2022-10-19 ASSESSMENT — COGNITIVE AND FUNCTIONAL STATUS - GENERAL
HELP NEEDED FOR BATHING: A LITTLE
CLIMB 3 TO 5 STEPS WITH RAILING: A LITTLE
WALKING IN HOSPITAL ROOM: A LITTLE
DRESSING REGULAR LOWER BODY CLOTHING: A LITTLE
SUGGESTED CMS G CODE MODIFIER MOBILITY: CK
MOVING TO AND FROM BED TO CHAIR: A LITTLE
DRESSING REGULAR UPPER BODY CLOTHING: A LITTLE
MOBILITY SCORE: 18
TOILETING: A LITTLE
STANDING UP FROM CHAIR USING ARMS: A LITTLE
MOVING FROM LYING ON BACK TO SITTING ON SIDE OF FLAT BED: A LITTLE
DAILY ACTIVITIY SCORE: 20
TURNING FROM BACK TO SIDE WHILE IN FLAT BAD: A LITTLE
SUGGESTED CMS G CODE MODIFIER DAILY ACTIVITY: CJ

## 2022-10-19 ASSESSMENT — CHA2DS2 SCORE
HYPERTENSION: YES
CHF OR LEFT VENTRICULAR DYSFUNCTION: YES
CHA2DS2 VASC SCORE: 5
AGE 75 OR GREATER: NO
DIABETES: NO
VASCULAR DISEASE: YES
AGE 65 TO 74: YES
PRIOR STROKE OR TIA OR THROMBOEMBOLISM: NO
SEX: FEMALE

## 2022-10-19 ASSESSMENT — PAIN DESCRIPTION - PAIN TYPE
TYPE: ACUTE PAIN
TYPE: ACUTE PAIN

## 2022-10-19 ASSESSMENT — FIBROSIS 4 INDEX
FIB4 SCORE: 2.569456219350291778
FIB4 SCORE: 3.28

## 2022-10-19 ASSESSMENT — PATIENT HEALTH QUESTIONNAIRE - PHQ9
1. LITTLE INTEREST OR PLEASURE IN DOING THINGS: SEVERAL DAYS
2. FEELING DOWN, DEPRESSED, IRRITABLE, OR HOPELESS: SEVERAL DAYS
SUM OF ALL RESPONSES TO PHQ9 QUESTIONS 1 AND 2: 2

## 2022-10-19 NOTE — PROGRESS NOTES
[facility] Progress Note      HD ordered by Dr. Villar. Treatment started at 0702 and ended at 1002.     Total Net UF 2000mL.    Pt tolerated treatment well with no issues. See flow sheet for details. Cannulation needles taken out, held pressure for 10 min and placed gauze dressing with no bleeding. Dressing CDI post dialysis, primary RN instructed to monitor for bleeding. LUE AVF + for bruit/thrill. Report given to JEET Dailey RN.

## 2022-10-19 NOTE — DISCHARGE PLANNING
Case Management Discharge Planning    Admission Date: 10/19/2022  GMLOS: 3.9  ALOS: 0    6-Clicks ADL Score: 20  6-Clicks Mobility Score: 18      Anticipated Discharge Dispo:  Home    DME Needed: No    Action(s) Taken: LSW completed chart review. Discussed pt in IDT rounds. Per MD, goal is to wean pt off oxygen. Per MD, plan to d/c once off oxygen. Per RN, pt will need cab ride home.    Escalations Completed: None    Next Steps: LSW to follow and assist as needed. LSW to assist with transport if needed.    Barriers to Discharge: None

## 2022-10-19 NOTE — CONSULTS
Nephrology Consultation    Date of Service  10/19/2022    Referring Physician  Klaus Campa M.D.    Consulting Physician  Priyank Villar M.D.    Reason for Consultation  Management of ESRD on HD      History of Presenting Illness  71 y.o. female with a history of diabetes, hypertension, ESRD on dialysis Monday Wednesday Friday who presented 10/19/2022 with shortness of breath.    This patient is well-known to me from outpatient dialysis at [facility], where she is under my care.  The patient is very compliant with her dialysis treatments, and attended her last dialysis on Monday.  Her target weight was set at 63.5 kg, and she completed dialysis on Monday weighing out to 63.2 kg.  However, on Tuesday evening, the patient started experiencing shortness of breath.  She had difficulty catching her breath when lying down or standing up.  The dyspnea persisted, and the patient came into the hospital.  On imaging, the patient had volume overload.  The patient was admitted to the hospital.  The patient is seen and examined while receiving dialysis this morning.  Patient is feeling slightly better with ultrafiltration with dialysis.  Ultrafiltration goal 2 L today.  The patient has a history of restless legs and cramping, so sometimes she does not tolerate more ultrafiltration.  She currently denies chest pain, headache, nausea, vomiting, abdominal pain.  The patient is actively listed as a transplant candidate for kidney transplant at [facility].  The patient is anuric.    Review of Systems  Review of Systems   Constitutional:  Negative for fever.   Respiratory:  Positive for shortness of breath.    Cardiovascular:  Negative for chest pain.   Gastrointestinal:  Negative for abdominal pain.   All other systems reviewed and are negative.    Past Medical History  Past Medical History:   Diagnosis Date    Acquired hypothyroidism 5/4/2020    CAD (coronary artery disease)     Chronic diastolic heart failure  ([facility]) 5/4/2020    Coronary artery disease due to lipid rich plaque     2 Synergy NOEMI to 100% RCA stent placed    Dental disorder     partial dentures- uppers    Diabetes ([facility])     oral medication    ESRD (end stage renal disease) on dialysis ([facility]) 5/4/2020    Hemodialysis patient ([facility])     M, W, F    Hyperlipidemia     Hypertension     Kidney transplant candidate     Presence of drug-eluting stent in right coronary artery     QT prolongation 1/22/2020    RLS (restless legs syndrome) 8/5/2016    Transaminitis 12/22/2018       Surgical History  Past Surgical History:   Procedure Laterality Date    ZZZ CARDIAC CATH  9/7/2016    RCA stented with 2 Synergy drug-eluting stents.    RECOVERY  8/16/2016    Procedure: CATH LAB [facility] WITH POSSIBLE DR. CASTILLO;  Surgeon: Recoveryonly Surgery;  [place] [facility]  Service:     ZZZ CARDIAC CATH  8/16/16    100% RCA    OTHER Left 2014    left arm upper extremity fistula    OTHER ABDOMINAL SURGERY      left kidney removed due to cancer       Family History  Family History   Problem Relation Age of Onset    Diabetes Sister     Other Sister         liver disease    Diabetes Brother     Heart Disease Neg Hx        Social History  Social History     Socioeconomic History    Marital status:      Spouse name: Not on file    Number of children: Not on file    Years of education: Not on file    Highest education level: Not on file   Occupational History    Not on file   Tobacco Use    Smoking status: Never    Smokeless tobacco: Never   Vaping Use    Vaping Use: Never used   Substance and Sexual Activity    Alcohol use: No     Alcohol/week: 0.0 oz    Drug use: No    Sexual activity: Never   Other Topics Concern    Not on file   Social History Narrative    Not on file     Social Determinants of Health     Financial Resource Strain: Not on file   Food Insecurity: Not on file   Transportation Needs: Not on file   Physical Activity: Not on file   Stress: Not on  file   Social Connections: Not on file   Intimate Partner Violence: Not on file   Housing Stability: Not on file       Medications  Current Facility-Administered Medications   Medication Dose Route Frequency Provider Last Rate Last Admin    epoetin (Retacrit) injection (Dialysis Use Only) 3,000 Units  3,000 Units Intravenous MO, WE + FR Priyank Villar M.D.   3,000 Units at 10/19/22 0918    amLODIPine (NORVASC) tablet 10 mg  10 mg Oral DAILY DANITZA GonzalezO.   10 mg at 10/19/22 1041    apixaban (ELIQUIS) tablet 2.5 mg  2.5 mg Oral BID DANITZA GonzalezO.   2.5 mg at 10/19/22 1042    aspirin EC (ECOTRIN) tablet 81 mg  81 mg Oral DAILY DANITZA GonzalezO.   81 mg at 10/19/22 0611    carvedilol (COREG) tablet 3.125 mg  3.125 mg Oral BID WITH MEALS DANITZA GonzalezOOctaviano   3.125 mg at 10/19/22 1042    famotidine (PEPCID) tablet 20 mg  20 mg Oral BID Jesus Alberto Cuba D.O.        hydrALAZINE (APRESOLINE) tablet 100 mg  100 mg Oral BID DANITZA GonzalezO.   100 mg at 10/19/22 1038    levothyroxine (SYNTHROID) tablet 50 mcg  50 mcg Oral AM ES DANITZA GonzalezO.   50 mcg at 10/19/22 1042    lisinopril (PRINIVIL) tablet 40 mg  40 mg Oral DAILY DANITZA GonzalezO.   40 mg at 10/19/22 1040    ROPINIRole (REQUIP) tablet 1 mg  1 mg Oral BID DANITZA GonzalezO.   1 mg at 10/19/22 1041    senna-docusate (PERICOLACE or SENOKOT S) 8.6-50 MG per tablet 2 Tablet  2 Tablet Oral BID Jesus Alberto Cuba D.O.        And    polyethylene glycol/lytes (MIRALAX) PACKET 1 Packet  1 Packet Oral QDAY PRN Jesus Alberto Cuba D.O.        And    magnesium hydroxide (MILK OF MAGNESIA) suspension 30 mL  30 mL Oral QDAY PRN Jesus Alberto Cuba D.O.        And    bisacodyl (DULCOLAX) suppository 10 mg  10 mg Rectal QDAY PRN Jesus Alberto Cuba D.O.        acetaminophen (Tylenol) tablet 650 mg  650 mg Oral Q6HRS PRN Jesus Alberto Cuba D.O.        labetalol (NORMODYNE/TRANDATE) injection 10 mg  10 mg Intravenous Q4HRS PRN Jesus Alberto Cuba D.O.         ondansetron (ZOFRAN) syringe/vial injection 4 mg  4 mg Intravenous Q4HRS PRN Jesus Alberto Cuba D.O.        ondansetron (ZOFRAN ODT) dispertab 4 mg  4 mg Oral Q4HRS PRN Jesus Alberto Cuba D.O.        heparin injection 1,500 Units  1,500 Units Intravenous DIALYSIS PRN Priyank Villar M.D.   1,500 Units at 10/19/22 0659    sevelamer carbonate (RENVELA) tablet 800 mg  800 mg Oral TID WITH MEALS Klaus Campa M.D.           Allergies  No Known Allergies    Physical Exam  Temp:  [36.4 °C (97.5 °F)-37.2 °C (98.9 °F)] 36.8 °C (98.2 °F)  Pulse:  [62-86] 79  Resp:  [18-28] 18  BP: (140-216)/(47-90) 140/47  SpO2:  [76 %-99 %] 82 %    Physical Exam  Constitutional:       General: She is not in acute distress.     Appearance: She is well-developed.   HENT:      Head: Normocephalic and atraumatic.      Mouth/Throat:      Mouth: Mucous membranes are moist.      Pharynx: Oropharynx is clear. No oropharyngeal exudate.   Eyes:      General: No scleral icterus.     Extraocular Movements: Extraocular movements intact.   Neck:      Trachea: No tracheal deviation.   Cardiovascular:      Rate and Rhythm: Normal rate and regular rhythm.      Heart sounds: Normal heart sounds. No murmur heard.  Pulmonary:      Effort: Pulmonary effort is normal.      Breath sounds: No stridor. No rales.   Abdominal:      Palpations: Abdomen is soft.      Tenderness: There is no abdominal tenderness.   Musculoskeletal:         General: Normal range of motion.      Cervical back: Normal range of motion. No rigidity.      Right lower leg: No edema.      Left lower leg: No edema.   Skin:     General: Skin is warm and dry.   Neurological:      General: No focal deficit present.      Mental Status: She is alert and oriented to person, place, and time.   Psychiatric:         Mood and Affect: Mood normal.         Behavior: Behavior normal.   Dialysis access: Left arm AV fistula, accessed with needles.      Fluids  Date 10/19/22 0700 - 10/20/22 0659   Shift 5768-4800  5326-0358 2798-7881 24 Hour Total   INTAKE   P.O. 240   240   Dialysis 500   500   Shift Total 740   740   OUTPUT   Dialysis 2500   2500   Shift Total 2500   2500   Weight (kg) 67.2 67.2 67.2 67.2       Laboratory  Labs reviewed, pertinent labs below.  Recent Labs     10/19/22  0106   WBC 3.3*   RBC 2.94*   HEMOGLOBIN 9.3*   HEMATOCRIT 28.3*   MCV 96.3   MCH 31.6   MCHC 32.9*   RDW 46.2   PLATELETCT 128*   MPV 10.8     Recent Labs     10/19/22  0106   SODIUM 139   POTASSIUM 3.8   CHLORIDE 94*   CO2 28   GLUCOSE 125*   BUN 28*   CREATININE 6.93*   CALCIUM 10.4*                URINALYSIS:  No results found for: COLORURINE, CLARITY, SPECGRAVITY, PHURINE, KETONES, PROTEINURIN, BILIRUBINUR, UROBILU, NITRITE, LEUKESTERAS, OCCULTBLOOD  UPC  No results found for: TOTPROTUR No results found for: CREATININEU    Imaging interpreted by radiologist. Imaging reports reviewed with pertinent findings below  DX-CHEST-PORTABLE (1 VIEW)   Final Result      Increasing lung volumes with worsening peripheral right basilar opacity from atelectasis or consolidation      Interstitial edema and cardiac enlargement as before      DX-CHEST-PORTABLE (1 VIEW)   Final Result      1.  Cardiomegaly with evidence of mild fluid overload..            Assessment/Plan  71 y.o. female with a history of diabetes, hypertension, ESRD on dialysis Monday Wednesday Friday who presented 10/19/2022 with shortness of breath.    1.  ESRD on dialysis Monday Wednesday Friday.  Anuric.  Plan on dialysis today per usual schedule.  Likely plan on dialysis again tomorrow given persistent hypoxia after dialysis.  Check labs daily.  Patient is anuric.    2.  Dialysis access: Left arm AV fistula, patent.  Left arm precautions.    3.  Acute hypoxic respiratory failure, reason for admission, likely due to volume overload, but also query if patient has pneumonia.  Continue ultrafiltration with dialysis as tolerated until patient has muscle cramps or hypotension.  Defer  further management to primary team.    4.  Anemia of chronic disease.  Order Epogen thrice weekly with dialysis.  Check CBC at least 3 times a week.    5.  Leukopenia, chronic, mild, and persistent.  Patient has a history of bone marrow biopsy with cancer care specialists, that showed no significant abnormalities.    6.  Thrombocytopenia, chronic, mild, and persistent.  Patient had a history of bone marrow biopsy with cancer care specialist that showed no significant abnormalities.  Check CBC daily.    Discussed with Dr. Katheryn Villar MD  Nephrology  [facility]

## 2022-10-19 NOTE — ASSESSMENT & PLAN NOTE
- Usual outpatient hemodialysis on Mondays, Wednesday, and Friday.  Per the patient, she has not missed any dialysis days.  -May need to change her dry weight target.  -Nephrology on board.

## 2022-10-19 NOTE — ASSESSMENT & PLAN NOTE
- Initially felt to be due to volume overload.  Suspect that her outpatient dialysis is not putting enough fluids, may need to change her dry weight target.  Chest imaging showing bilateral lung base atelectasis/edema, and small pleural effusions.  Procalcitonin is elevated, but this is also in setting of her ESRD.  Unable to totally rule out pneumonia.  She is on anticoagulation for her PAF, and she has no PE on CTA.  -Continue hemodialysis per nephrology.  Will need further volume removal today.  -Continue empiric antibiotics for pneumonia.  Transition to oral Augmentin, and continue doxycycline. Trend procalcitonin.  -Continue respiratory support with RT protocol and oxygen supplement.  Continue to wean off oxygen supplement to keep saturations above 90%.

## 2022-10-19 NOTE — PROGRESS NOTES
Telemetry Shift Summary    Rhythm SA  HR Range 70s  Ectopy fPVC  Measurements 0.16/0.08/0.38        Normal Values  Rhythm SR  HR Range    Measurements 0.12-0.20 / 0.06-0.10  / 0.30-0.52

## 2022-10-19 NOTE — ASSESSMENT & PLAN NOTE
- Continue home Synthroid at 50 mcg daily  -Most recent TSH was approximately 2 months ago was normal at 2.6

## 2022-10-19 NOTE — H&P
Hospital Medicine History & Physical Note    Date of Service  10/19/2022    Primary Care Physician  MALGORZATA Concepcion.    Consultants  nephrology    Specialist Names: Dr Villar    Code Status  Full Code    Chief Complaint  Chief Complaint   Patient presents with    Abdominal Pain     For about a week now     Fever     Thinks she has been having fevers     N/V     For the last weeks         History of Presenting Illness  Tere Gallegos is a 71 y.o. female who presented 10/19/2022 with shortness of breath.  Patient does have a history of end-stage renal disease and on hemodialysis Monday, Wednesday and Friday, does states she went to dialysis on Monday.  Patient began having shortness of breath and cough.  Upon arrival, pulmonary edema was noted on chest x-ray.  I did discuss the case including labs and imaging with the ER physician.    I discussed the plan of care with  ERP .    Review of Systems  Review of Systems   Constitutional:  Negative for chills, fever and malaise/fatigue.   HENT:  Negative for congestion.    Respiratory:  Positive for shortness of breath. Negative for cough, sputum production and stridor.    Cardiovascular:  Negative for chest pain, palpitations and leg swelling.   Gastrointestinal:  Positive for nausea and vomiting. Negative for abdominal pain, constipation and diarrhea.   Genitourinary:  Negative for dysuria and urgency.   Musculoskeletal:  Negative for falls and myalgias.   Neurological:  Negative for dizziness, tingling, loss of consciousness, weakness and headaches.   Psychiatric/Behavioral:  Negative for depression and suicidal ideas.    All other systems reviewed and are negative.    Past Medical History   has a past medical history of Acquired hypothyroidism (5/4/2020), CAD (coronary artery disease), Chronic diastolic heart failure (HCC) (5/4/2020), Coronary artery disease due to lipid rich plaque, Dental disorder, Diabetes (HCC), ESRD (end stage renal disease) on  dialysis ([facility]) (5/4/2020), Hemodialysis patient ([facility]), Hyperlipidemia, Hypertension, Kidney transplant candidate, Presence of drug-eluting stent in right coronary artery, QT prolongation (1/22/2020), RLS (restless legs syndrome) (8/5/2016), and Transaminitis (12/22/2018).    Surgical History   has a past surgical history that includes recovery (8/16/2016); other abdominal surgery; other (Left, 2014); zzz cardiac cath (8/16/16); and zzz cardiac cath (9/7/2016).     Family History  family history includes Diabetes in her brother and sister; Other in her sister.   Family history reviewed with patient. There is no family history that is pertinent to the chief complaint.     Social History   reports that she has never smoked. She has never used smokeless tobacco. She reports that she does not drink alcohol and does not use drugs.    Allergies  No Known Allergies    Medications  Prior to Admission Medications   Prescriptions Last Dose Informant Patient Reported? Taking?   Alcohol Swabs   No No   Sig: Wipe site with prep pad prior to injection.   Blood Glucose Meter Kit   No No   Sig: Test blood sugar as recommended by provider. Freestyle Pearl blood glucose monitoring kit.   Blood Glucose Test Strips   No No   Sig: Use one Freestyle Pearl strip to test blood sugar once daily .   Lancets   No No   Sig: Use one Freestyle Pearl lancet to test blood sugar once daily .   ROPINIRole (REQUIP) 1 MG Tab  Patient's Home Pharmacy No No   Sig: Take 1 Tablet by mouth 2 times a day.   amLODIPine (NORVASC) 10 MG Tab   No No   Sig: Take 1 Tablet by mouth every day.   apixaban (ELIQUIS) 2.5mg Tab  Patient's Home Pharmacy No No   Sig: Take 1 Tablet by mouth 2 times a day. Indications: Thromboembolism secondary to Atrial Fibrillation   aspirin EC (ECOTRIN) 81 MG Tablet Delayed Response   Yes No   Sig: Take 81 mg by mouth every day.   carvedilol (COREG) 3.125 MG Tab   No No   Sig: Take 1 Tablet by mouth 2 times a day with meals.   famotidine  (PEPCID) 20 MG Tab   No No   Sig: Take 1 Tablet by mouth 2 times a day.   hydrALAZINE (APRESOLINE) 100 MG tablet   No No   Sig: Take 1 Tablet by mouth 2 times a day.   levothyroxine (SYNTHROID) 50 MCG Tab   No No   Sig: TOME FERNANDO TABLETA POR VIA ORAL CADA MANANA ON AN EMPTY STOMACH   lisinopril (PRINIVIL) 40 MG tablet  Patient's Home Pharmacy No No   Sig: TOME FERNANDO TABLETA TODOS [place]   Patient taking differently: Take 40 mg by mouth every day.   pioglitazone (ACTOS) 30 MG Tab  Patient's Home Pharmacy No No   Sig: TOME FERNANDO TABLETA TODOS [place]   Patient taking differently: Take 30 mg by mouth every day.   sevelamer (RENAGEL) 800 MG Tab   Yes No   Sig: Take 800 mg by mouth 3 times a day with meals.      Facility-Administered Medications: None       Physical Exam  Temp:  [37.2 °C (98.9 °F)] 37.2 °C (98.9 °F)  Pulse:  [62-86] 75  Resp:  [20-28] 22  BP: (172-216)/(73-90) 216/90  SpO2:  [76 %-99 %] 91 %  Blood Pressure : (!) 216/90   Temperature: 37.2 °C (98.9 °F)   Pulse: 75   Respiration: (!) 22   Pulse Oximetry: 91 %       Physical Exam  Vitals and nursing note reviewed.   Constitutional:       General: She is not in acute distress.     Appearance: She is well-developed. She is not diaphoretic.   HENT:      Head: Normocephalic and atraumatic.      Right Ear: External ear normal.      Left Ear: External ear normal.      Nose: Nose normal. No congestion or rhinorrhea.      Mouth/Throat:      Pharynx: No oropharyngeal exudate.   Eyes:      General:         Right eye: No discharge.         Left eye: No discharge.   Neck:      Trachea: No tracheal deviation.   Cardiovascular:      Rate and Rhythm: Normal rate and regular rhythm.      Heart sounds: No murmur heard.    No friction rub. No gallop.   Pulmonary:      Effort: Tachypnea present. No respiratory distress.      Breath sounds: No stridor. Rales present. No wheezing.   Chest:      Chest wall: No tenderness.   Abdominal:      General: Bowel sounds are normal. There  is no distension.      Palpations: Abdomen is soft.      Tenderness: There is no abdominal tenderness.   Musculoskeletal:         General: No tenderness. Normal range of motion.      Cervical back: Neck supple.      Right lower leg: No edema.      Left lower leg: No edema.   Lymphadenopathy:      Cervical: No cervical adenopathy.   Skin:     General: Skin is warm and dry.      Findings: No erythema or rash.   Neurological:      Mental Status: She is alert and oriented to person, place, and time.      Cranial Nerves: No cranial nerve deficit.   Psychiatric:         Mood and Affect: Mood normal.         Behavior: Behavior normal.         Thought Content: Thought content normal.         Judgment: Judgment normal.       Laboratory:  Recent Labs     10/19/22  0106   WBC 3.3*   RBC 2.94*   HEMOGLOBIN 9.3*   HEMATOCRIT 28.3*   MCV 96.3   MCH 31.6   MCHC 32.9*   RDW 46.2   PLATELETCT 128*   MPV 10.8     Recent Labs     10/19/22  0106   SODIUM 139   POTASSIUM 3.8   CHLORIDE 94*   CO2 28   GLUCOSE 125*   BUN 28*   CREATININE 6.93*   CALCIUM 10.4*     Recent Labs     10/19/22  0106   ALTSGPT 13   ASTSGOT 21   ALKPHOSPHAT 73   TBILIRUBIN 0.7   LIPASE 51   GLUCOSE 125*         No results for input(s): NTPROBNP in the last 72 hours.      No results for input(s): TROPONINT in the last 72 hours.    Imaging:  DX-CHEST-PORTABLE (1 VIEW)   Final Result      1.  Cardiomegaly with evidence of mild fluid overload..          X-Ray:  I have personally reviewed the images and compared with prior images.    Assessment/Plan:  Justification for Admission Status  I anticipate this patient will require at least two midnights for appropriate medical management, necessitating inpatient admission because acute respiratory failure with hypoxia due to pulmonary edema    Patient will need a Telemetry bed on MEDICAL service .  The need is secondary to pulmonary edema acute respiratory failure.    * Acute respiratory failure with hypoxia (HCC)- (present  on admission)  Assessment & Plan  - Patient does have significant hypoxia on room air at 75%, also with tachypnea  -Pulmonary edema noted on chest x-ray  -Patient does require urgent hemodialysis, she does require dialysis at baseline, Monday Wednesday and Friday, states she did receive dialysis on Monday  -Additional recommendations per nephrology    ESRD (end stage renal disease) on dialysis (HCC)- (present on admission)  Assessment & Plan  - Dialysis is Monday Wednesday and Friday, she states she did receive dialysis on Monday  -Now with fluid overload, Will need urgent dialysis now  -May need daily dialysis for multiple days  -Additional recommendations per nephrology    Hypertensive emergency- (present on admission)  Assessment & Plan  - Patient's hypertension has been significantly elevated while in the hospital  -If she did receive her dialysis on Monday, I think this is the most likely cause of the acute onset of pulmonary edema  -Patient will need more aggressive treatment of her hypertension  -Will likely need medications adjusted  -However, when she does get hemodialysis, her blood pressure may improve  -For now, continue home amlodipine, Coreg, hydralazine, lisinopril  -Start as needed labetalol    GERD (gastroesophageal reflux disease)- (present on admission)  Assessment & Plan  - Continue home Pepcid    Paroxysmal A-fib (HCC)- (present on admission)  Assessment & Plan  - Currently sinus on home Coreg  -Continue home Eliquis  -Patient will be monitored on telemetry    Pancytopenia (HCC)- (present on admission)  Assessment & Plan  - Chronic, no sign of gross bleeding    Acquired hypothyroidism- (present on admission)  Assessment & Plan  - Continue home Synthroid at 50 mcg daily  -Most recent TSH was approximately 2 months ago was normal at 2.6    RLS (restless legs syndrome)- (present on admission)  Assessment & Plan  - Continue home Requip      VTE prophylaxis: therapeutic anticoagulation with Eliquis

## 2022-10-19 NOTE — PROGRESS NOTES
12 hour chart pain check complete     Telemetry Shift Summary    Rhythm Sinus Rhythm  HR Range 69-77  Ectopy rare PAC rare PVC  Measurements .16/.08/.44        Normal Values  Rhythm SR  HR Range    Measurements 0.12-0.20 / 0.06-0.10  / 0.30-0.52

## 2022-10-19 NOTE — PROGRESS NOTES
Pt is not sure what medications she is taking, called pts daughter (Catie and Kari) and son (Manoj) @ [phone] are not sure what medications pt is taking.  Per son and daughter reports that pt only [facility].  [facility], will call when they open

## 2022-10-19 NOTE — ED NOTES
Gave report to Dialysis nurse Neida. Patient to be moved upstairs prior to dialysis due to patient request.

## 2022-10-19 NOTE — PROGRESS NOTES
Unable to complete med reconciliation patient forgot which blood pressure medication she was told to discontinue. Will follow up with the day team.

## 2022-10-19 NOTE — ED PROVIDER NOTES
ED Provider Note    Chief Complaint:   Abdominal pain    HPI:  Tere Gallegos is a very pleasant 71-year-old female with past medical history of ESRD on hemodialysis Monday Wednesday Friday, coronary artery disease, diastolic heart failure, hyperlipidemia, hypertension who presents with left upper quadrant abdominal pain for 1 week with associated nausea and vomiting.  Patient reports the pain is moderate intensity, nonradiating, dull, unchanged with meals.  Patient does however report that she has had decreased oral intake.  Patient denies black or bloody stools.  Patient also reports a cough and some shortness of breath.    Review of Systems:  Review of Systems   Constitutional:  Negative for chills and fever.   HENT:  Negative for congestion and sore throat.    Eyes:  Negative for blurred vision and double vision.   Respiratory:  Positive for shortness of breath. Negative for cough.    Cardiovascular:  Negative for chest pain and leg swelling.   Gastrointestinal:  Positive for abdominal pain, nausea and vomiting.   Genitourinary:  Negative for dysuria and hematuria.   Musculoskeletal:  Negative for falls and neck pain.   Skin:  Negative for itching and rash.   Neurological:  Negative for loss of consciousness and headaches.     Family History:  Family History   Problem Relation Age of Onset    Diabetes Sister     Other Sister         liver disease    Diabetes Brother     Heart Disease Neg Hx        Past Medical History:   has a past medical history of Acquired hypothyroidism (5/4/2020), CAD (coronary artery disease), Chronic diastolic heart failure (HCC) (5/4/2020), Coronary artery disease due to lipid rich plaque, Dental disorder, Diabetes ([facility]), ESRD (end stage renal disease) on dialysis ([facility]) (5/4/2020), Hemodialysis patient ([facility]), Hyperlipidemia, Hypertension, Kidney transplant candidate, Presence of drug-eluting stent in right coronary artery, QT prolongation (1/22/2020), RLS (restless legs  "syndrome) (8/5/2016), and Transaminitis (12/22/2018).    Social History:  Social History     Tobacco Use    Smoking status: Never    Smokeless tobacco: Never   Vaping Use    Vaping Use: Never used   Substance and Sexual Activity    Alcohol use: No     Alcohol/week: 0.0 oz    Drug use: No    Sexual activity: Never       Surgical History:   has a past surgical history that includes recovery (8/16/2016); other abdominal surgery; other (Left, 2014); zzz cardiac cath (8/16/16); and zzz cardiac cath (9/7/2016).    Allergies:  No Known Allergies    Physical Exam:  Vital Signs: BP (!) 216/90   Pulse 62   Temp 37.2 °C (98.9 °F) (Temporal)   Resp (!) 21   Ht 1.6 m (5' 3\")   Wt 63.6 kg (140 lb 3.4 oz)   LMP  (LMP Unknown)   SpO2 96%   BMI 24.84 kg/m²   Physical Exam  Vitals and nursing note reviewed.   Constitutional:       Comments: Patient is lying in bed supine, pleasant, conversant, speaking in complete sentences   HENT:      Head: Normocephalic and atraumatic.   Eyes:      Extraocular Movements: Extraocular movements intact.      Conjunctiva/sclera: Conjunctivae normal.      Pupils: Pupils are equal, round, and reactive to light.   Cardiovascular:      Pulses: Normal pulses.      Comments: HR 62  Pulmonary:      Effort: Pulmonary effort is normal. No respiratory distress.      Comments: On 2L NC, 75% on RA  Abdominal:      Comments: Abdomen is soft, epigastric tenderness to palpation, no rebound, guarding or masses, no rigidity.   Musculoskeletal:         General: No swelling. Normal range of motion.      Cervical back: Normal range of motion. No rigidity.      Comments: Left upper extremity fistula in place with thrill   Skin:     General: Skin is warm and dry.      Capillary Refill: Capillary refill takes less than 2 seconds.   Neurological:      Mental Status: She is alert.       Medical records reviewed for continuity of care.     Results for orders placed or performed during the hospital encounter of 10/19/22 "   CBC with Differential   Result Value Ref Range    WBC 3.3 (L) 4.8 - 10.8 K/uL    RBC 2.94 (L) 4.20 - 5.40 M/uL    Hemoglobin 9.3 (L) 12.0 - 16.0 g/dL    Hematocrit 28.3 (L) 37.0 - 47.0 %    MCV 96.3 81.4 - 97.8 fL    MCH 31.6 27.0 - 33.0 pg    MCHC 32.9 (L) 33.6 - 35.0 g/dL    RDW 46.2 35.9 - 50.0 fL    Platelet Count 128 (L) 164 - 446 K/uL    MPV 10.8 9.0 - 12.9 fL    Neutrophils-Polys 81.30 (H) 44.00 - 72.00 %    Lymphocytes 9.20 (L) 22.00 - 41.00 %    Monocytes 7.70 0.00 - 13.40 %    Eosinophils 1.20 0.00 - 6.90 %    Basophils 0.30 0.00 - 1.80 %    Immature Granulocytes 0.30 0.00 - 0.90 %    Nucleated RBC 0.00 /100 WBC    Neutrophils (Absolute) 2.65 2.00 - 7.15 K/uL    Lymphs (Absolute) 0.30 (L) 1.00 - 4.80 K/uL    Monos (Absolute) 0.25 0.00 - 0.85 K/uL    Eos (Absolute) 0.04 0.00 - 0.51 K/uL    Baso (Absolute) 0.01 0.00 - 0.12 K/uL    Immature Granulocytes (abs) 0.01 0.00 - 0.11 K/uL    NRBC (Absolute) 0.00 K/uL   Complete Metabolic Panel   Result Value Ref Range    Sodium 139 135 - 145 mmol/L    Potassium 3.8 3.6 - 5.5 mmol/L    Chloride 94 (L) 96 - 112 mmol/L    Co2 28 20 - 33 mmol/L    Anion Gap 17.0 (H) 7.0 - 16.0    Glucose 125 (H) 65 - 99 mg/dL    Bun 28 (H) 8 - 22 mg/dL    Creatinine 6.93 (HH) 0.50 - 1.40 mg/dL    Calcium 10.4 (H) 8.4 - 10.2 mg/dL    AST(SGOT) 21 12 - 45 U/L    ALT(SGPT) 13 2 - 50 U/L    Alkaline Phosphatase 73 30 - 99 U/L    Total Bilirubin 0.7 0.1 - 1.5 mg/dL    Albumin 4.5 3.2 - 4.9 g/dL    Total Protein 7.3 6.0 - 8.2 g/dL    Globulin 2.8 1.9 - 3.5 g/dL    A-G Ratio 1.6 g/dL   Lipase   Result Value Ref Range    Lipase 51 7 - 58 U/L   ESTIMATED GFR   Result Value Ref Range    GFR (CKD-EPI) 6 (A) >60 mL/min/1.73 m 2   COV-2, FLU A/B, AND RSV BY PCR (2-4 HOURS CEPHEID): Collect NP swab in VTM    Specimen: Respirate   Result Value Ref Range    SARS-CoV-2 Source NP Swab        Radiology:  DX-CHEST-PORTABLE (1 VIEW)   Final Result      1.  Cardiomegaly with evidence of mild fluid  overload..           MDM:  Chest x-ray demonstrates mild fluid overload, patient is 75% on room air.  I do believe the patient is suffering from hypertensive emergency, pulmonary edema secondary to ESRD and heart failure.  Patient is requiring supplemental oxygen.  Patient requires emergent dialysis for stabilization.  Dr. Villar of nephrology has been consulted emergently for management and has graciously accepted the consult and will facilitate emergent dialysis.  Dr. Cuba of hospital medicine has been consulted and has graciously accepted the patient to his service for admission for hypoxia, hypertensive emergency, volume overload.  Patient has been given famotidine, GI cocktail, Zofran for epigastric discomfort.  CMP demonstrates no hyperkalemia, patient does have some hypercalcemia.  CBC demonstrates baseline anemia secondary to ESRD.  Disposition to the hospital medicine service.  Patient has been followed closely to ensure no severe decompensation.    Electronically signed by: Ritesh Flaherty M.D., 10/19/2022, 3:01 AM    This dictation has been created using voice recognition software. I am continuously working with the software to minimize the number of voice recognition errors and I have made every attempt to manually correct the errors within my dictation. However errors  related to this voice recognition software may still exist and should be interpreted within the appropriate context.     Electronically signed by: Ritesh Flaherty M.D., 10/19/2022 3:24 AM    Critical Care    I spent 42 minutes of critical care time with this patient. This does not include time spent on separately reported billable procedures.   This includes pulse ox interpretation, medical record review when available, interpretation of labs and imaging, specialist consultation, and hemodynamic monitoring.      Disposition:  Also medicine floor    Final Impression:  1. Hypertensive emergency    2. Acute pulmonary edema (HCC)     3. Hypervolemia, unspecified hypervolemia type    4. Acute on chronic diastolic congestive heart failure (HCC)    5. ESRD (end stage renal disease) (HCC)    6. Hypoxemia        Electronically signed by: Ritesh Flaherty M.D., 10/19/2022 3:25 AM

## 2022-10-19 NOTE — PROGRESS NOTES
Hospital Medicine Daily Progress Note    Date of Service  10/19/2022    Chief Complaint  Shortness of breath    Hospital Course  Tere Gallegos is a 71 y.o. female with end-stage renal disease on hemodialysis Monday Wednesday Friday, hypothyroidism, CAD, hypertension, admitted 10/19/2022 with shortness of breath and cough.  Chest x-ray showed pulmonary edema.  WBC 3300, creatinine within her ESRD levels.  She is requiring oxygen supplement as hypoxic on room air.  Nephrology was contacted for hemodialysis.  She was also felt to be in hypertensive urgency, and was placed on as needed IV labetalol along with continuation of her home antihypertensives.    Interval Problem Update  10/19/2022 - I reviewed the patient's chart today.  Seen and admitted by my partner, Dr. Cuba this morning.  Patient seen and admitted by my partner, Dr. Cuba this morning.  Uneventful night.  Blood pressure remains elevated in the 180s. Afebrile.  Requiring 4 L of oxygen by nasal cannula.  She had dialysis this morning, and removed 2 L.    > I have personally seen and examined the patient today.  She is already feeling better.  Breathing better after dialysis.  No cough.  No chest pain.  No other complaint such as nausea, vomiting, abdominal pain, bowel movement changes.  No leg edema.    I have discussed this patient's plan of care and discharge plan at IDT rounds today with Case Management, Nursing, Nursing leadership, and other members of the IDT team.          Consultants/Specialty  nephrology    Code Status  Full Code    Disposition  Patient is not medically cleared for discharge.   Anticipate discharge to to home with close outpatient follow-up.  I have placed the appropriate orders for post-discharge needs.    Review of Systems  ROS     Pertinent positives/negatives as mentioned above.     A complete review of systems was personally done by me. All other systems were negative.       Physical Exam  Temp:  [36.4 °C (97.5  °F)-37.2 °C (98.9 °F)] 36.8 °C (98.2 °F)  Pulse:  [62-86] 79  Resp:  [18-28] 18  BP: (140-216)/(47-90) 140/47  SpO2:  [76 %-99 %] 90 %    Physical Exam  Vitals reviewed.   Constitutional:       General: She is not in acute distress.     Appearance: Normal appearance. She is normal weight. She is not ill-appearing or diaphoretic.   HENT:      Head: Normocephalic and atraumatic.      Right Ear: External ear normal.      Left Ear: External ear normal.      Mouth/Throat:      Mouth: Mucous membranes are moist.      Pharynx: No oropharyngeal exudate or posterior oropharyngeal erythema.   Eyes:      General: No scleral icterus.     Extraocular Movements: Extraocular movements intact.      Conjunctiva/sclera: Conjunctivae normal.      Pupils: Pupils are equal, round, and reactive to light.   Cardiovascular:      Rate and Rhythm: Normal rate and regular rhythm.      Heart sounds: Normal heart sounds. No murmur heard.  Pulmonary:      Effort: Pulmonary effort is normal. No respiratory distress.      Breath sounds: Normal breath sounds. No stridor. No wheezing, rhonchi or rales.   Chest:      Chest wall: No tenderness.   Abdominal:      General: Bowel sounds are normal. There is no distension.      Palpations: Abdomen is soft. There is no mass.      Tenderness: There is no abdominal tenderness. There is no guarding or rebound.   Musculoskeletal:         General: No swelling. Normal range of motion.      Cervical back: Normal range of motion and neck supple. No rigidity. No muscular tenderness.      Right lower leg: No edema.      Left lower leg: No edema.   Lymphadenopathy:      Cervical: No cervical adenopathy.   Skin:     General: Skin is warm and dry.      Coloration: Skin is not jaundiced.      Findings: No rash.   Neurological:      General: No focal deficit present.      Mental Status: She is alert and oriented to person, place, and time. Mental status is at baseline.      Cranial Nerves: No cranial nerve deficit.    Psychiatric:         Mood and Affect: Mood normal.         Behavior: Behavior normal.         Thought Content: Thought content normal.         Judgment: Judgment normal.       Fluids    Intake/Output Summary (Last 24 hours) at 10/19/2022 1238  Last data filed at 10/19/2022 1002  Gross per 24 hour   Intake 500 ml   Output 2500 ml   Net -2000 ml       Laboratory  Recent Labs     10/19/22  0106   WBC 3.3*   RBC 2.94*   HEMOGLOBIN 9.3*   HEMATOCRIT 28.3*   MCV 96.3   MCH 31.6   MCHC 32.9*   RDW 46.2   PLATELETCT 128*   MPV 10.8     Recent Labs     10/19/22  0106   SODIUM 139   POTASSIUM 3.8   CHLORIDE 94*   CO2 28   GLUCOSE 125*   BUN 28*   CREATININE 6.93*   CALCIUM 10.4*                   Imaging  DX-CHEST-PORTABLE (1 VIEW)   Final Result      1.  Cardiomegaly with evidence of mild fluid overload..      DX-CHEST-PORTABLE (1 VIEW)    (Results Pending)        Assessment/Plan  * Acute respiratory failure with hypoxia (HCC)- (present on admission)  Assessment & Plan  - Due to volume overload.  Suspect that her outpatient dialysis is not putting enough fluids, may need to change her dry weight target.  - s/p hemodialysis x1.  May need a few more.  Defer to nephrology.  -Repeat chest x-ray (personally reviewed) still looks wet, with bilateral interstitial infiltrates.  -Continue respiratory support with RT protocol, continue oxygen supplement to keep saturations above 90%, wean as able.    Hypertensive emergency- (present on admission)  Assessment & Plan  - Patient's hypertension has been significantly elevated on admission, may have contributed to pulmonary edema.  -Blood pressure trend better with resumption of her home antihypertensives.  Continue home Norvasc, hydralazine, Coreg, and lisinopril.  Monitor blood pressure trend closely.  May need further adjustments if blood pressure fluctuates significantly.  Continue as needed IV labetalol for significant hypertension.    GERD (gastroesophageal reflux disease)-  (present on admission)  Assessment & Plan  - Continue home Pepcid    Paroxysmal A-fib (HCC)- (present on admission)  Assessment & Plan  - Maintaining sinus rhythm.  Continue home Coreg.  Continue anticoagulation with Eliquis.      Pancytopenia (HCC)- (present on admission)  Assessment & Plan  - Chronic, no sign of gross bleeding    Acquired hypothyroidism- (present on admission)  Assessment & Plan  - Continue home Synthroid at 50 mcg daily  -Most recent TSH was approximately 2 months ago was normal at 2.6    ESRD (end stage renal disease) on dialysis ([facility])- (present on admission)  Assessment & Plan  - Usual outpatient hemodialysis on Mondays, Wednesday, and Friday.  Per the patient, she has not missed any dialysis days.  -May need to change her dry weight target.  -Nephrology on board, defer regarding further need for hemodialysis.    RLS (restless legs syndrome)- (present on admission)  Assessment & Plan  - Continue home Requip       VTE prophylaxis: therapeutic anticoagulation with Eliquis

## 2022-10-19 NOTE — CARE PLAN
The patient is Stable - Low risk of patient condition declining or worsening    Shift Goals  Clinical Goals: dialysis, bp monitoring, repeat CXR, wean O2  Patient Goals: rest, discharge    Progress made toward(s) clinical / shift goals:  Pt tolerated dialysis well, 2L removed, likely to need another day of dialysis. Pt remains hypertensive, CXR reordered and not able to wean lower than 1L nasal cannula.     Patient is not progressing towards the following goals: Pt drops to 82% when removed from O2, requiring 1-2L NC, baseline of RA.       Problem: Fluid Volume  Goal: Fluid volume balance will be maintained  Outcome: Progressing     Problem: Respiratory  Goal: Patient will achieve/maintain optimum respiratory ventilation and gas exchange  Outcome: Not Progressing

## 2022-10-19 NOTE — PROGRESS NOTES
Med rec updated and complete, per [facility] ([phone])  Allergies reviewed, per pts family  Pt and pts family are not sure what medications pt is taking.  Called [facility] @ [phone] to verify all medications.  Pt last filled her CARVEDILOL 3.125MG on 8/2022 for 30 day supply. Pt last filled FAMOTIDINE 20MG 1 tablet BID on 9/17/2022 for 14 day course and SUCRALFATE 1G 1 tablet TID on 9/17/2022 for 7 day course, not sure if pt finished both courses of these medications.  SEVELAMER CARBONATE 800MG 1 tablet TID last picked up on 6/13/2021 for 90 day supply.

## 2022-10-19 NOTE — ED TRIAGE NOTES
Pt comes in w/ son  c/o L upper abdomen pain for over a week now  having N/V and loss of appetite unknown reason for this per pt   no Hx of such

## 2022-10-19 NOTE — ASSESSMENT & PLAN NOTE
- Patient's hypertension has been significantly elevated on admission, may have contributed to pulmonary edema.  -Blood pressure trend better with resumption of her home antihypertensives, and resumption of hemodialysis.  Continue home Norvasc, hydralazine, Coreg, and lisinopril.  Monitor blood pressure trend closely.  May need further adjustments if blood pressure fluctuates significantly.  Continue as needed IV labetalol for significant hypertension.

## 2022-10-20 LAB
ANION GAP SERPL CALC-SCNC: 10 MMOL/L (ref 7–16)
BUN SERPL-MCNC: 17 MG/DL (ref 8–22)
CALCIUM SERPL-MCNC: 9.6 MG/DL (ref 8.4–10.2)
CHLORIDE SERPL-SCNC: 98 MMOL/L (ref 96–112)
CO2 SERPL-SCNC: 30 MMOL/L (ref 20–33)
CREAT SERPL-MCNC: 5.27 MG/DL (ref 0.5–1.4)
ERYTHROCYTE [DISTWIDTH] IN BLOOD BY AUTOMATED COUNT: 47.4 FL (ref 35.9–50)
ERYTHROCYTE [DISTWIDTH] IN BLOOD BY AUTOMATED COUNT: 47.5 FL (ref 35.9–50)
GFR SERPLBLD CREATININE-BSD FMLA CKD-EPI: 8 ML/MIN/1.73 M 2
GLUCOSE SERPL-MCNC: 102 MG/DL (ref 65–99)
HCT VFR BLD AUTO: 24.2 % (ref 37–47)
HCT VFR BLD AUTO: 24.6 % (ref 37–47)
HGB BLD-MCNC: 7.7 G/DL (ref 12–16)
HGB BLD-MCNC: 7.9 G/DL (ref 12–16)
MCH RBC QN AUTO: 31.3 PG (ref 27–33)
MCH RBC QN AUTO: 31.7 PG (ref 27–33)
MCHC RBC AUTO-ENTMCNC: 31.8 G/DL (ref 33.6–35)
MCHC RBC AUTO-ENTMCNC: 32.1 G/DL (ref 33.6–35)
MCV RBC AUTO: 98.4 FL (ref 81.4–97.8)
MCV RBC AUTO: 98.8 FL (ref 81.4–97.8)
PLATELET # BLD AUTO: 114 K/UL (ref 164–446)
PLATELET # BLD AUTO: 115 K/UL (ref 164–446)
PMV BLD AUTO: 10.7 FL (ref 9–12.9)
PMV BLD AUTO: 10.9 FL (ref 9–12.9)
POTASSIUM SERPL-SCNC: 4.9 MMOL/L (ref 3.6–5.5)
PROCALCITONIN SERPL-MCNC: 0.53 NG/ML
RBC # BLD AUTO: 2.46 M/UL (ref 4.2–5.4)
RBC # BLD AUTO: 2.49 M/UL (ref 4.2–5.4)
SODIUM SERPL-SCNC: 138 MMOL/L (ref 135–145)
WBC # BLD AUTO: 3.2 K/UL (ref 4.8–10.8)
WBC # BLD AUTO: 3.3 K/UL (ref 4.8–10.8)

## 2022-10-20 PROCEDURE — 80048 BASIC METABOLIC PNL TOTAL CA: CPT

## 2022-10-20 PROCEDURE — 700111 HCHG RX REV CODE 636 W/ 250 OVERRIDE (IP): Performed by: INTERNAL MEDICINE

## 2022-10-20 PROCEDURE — 700102 HCHG RX REV CODE 250 W/ 637 OVERRIDE(OP): Performed by: INTERNAL MEDICINE

## 2022-10-20 PROCEDURE — 36415 COLL VENOUS BLD VENIPUNCTURE: CPT

## 2022-10-20 PROCEDURE — 94760 N-INVAS EAR/PLS OXIMETRY 1: CPT

## 2022-10-20 PROCEDURE — 90935 HEMODIALYSIS ONE EVALUATION: CPT | Performed by: INTERNAL MEDICINE

## 2022-10-20 PROCEDURE — 700105 HCHG RX REV CODE 258: Performed by: INTERNAL MEDICINE

## 2022-10-20 PROCEDURE — A9270 NON-COVERED ITEM OR SERVICE: HCPCS | Performed by: INTERNAL MEDICINE

## 2022-10-20 PROCEDURE — 99233 SBSQ HOSP IP/OBS HIGH 50: CPT | Performed by: INTERNAL MEDICINE

## 2022-10-20 PROCEDURE — 90935 HEMODIALYSIS ONE EVALUATION: CPT

## 2022-10-20 PROCEDURE — 85027 COMPLETE CBC AUTOMATED: CPT

## 2022-10-20 PROCEDURE — 84145 PROCALCITONIN (PCT): CPT

## 2022-10-20 PROCEDURE — 770006 HCHG ROOM/CARE - MED/SURG/GYN SEMI*

## 2022-10-20 RX ORDER — DOXYCYCLINE 100 MG/1
100 TABLET ORAL EVERY 12 HOURS
Status: DISCONTINUED | OUTPATIENT
Start: 2022-10-20 | End: 2022-10-23 | Stop reason: HOSPADM

## 2022-10-20 RX ADMIN — DOXYCYCLINE 100 MG: 100 TABLET, FILM COATED ORAL at 08:22

## 2022-10-20 RX ADMIN — FAMOTIDINE 20 MG: 20 TABLET, FILM COATED ORAL at 05:49

## 2022-10-20 RX ADMIN — APIXABAN 2.5 MG: 2.5 TABLET, FILM COATED ORAL at 05:53

## 2022-10-20 RX ADMIN — SENNOSIDES AND DOCUSATE SODIUM 2 TABLET: 50; 8.6 TABLET ORAL at 05:51

## 2022-10-20 RX ADMIN — SENNOSIDES AND DOCUSATE SODIUM 2 TABLET: 50; 8.6 TABLET ORAL at 17:04

## 2022-10-20 RX ADMIN — SEVELAMER CARBONATE 800 MG: 800 TABLET, FILM COATED ORAL at 17:03

## 2022-10-20 RX ADMIN — CEFTRIAXONE SODIUM 1 G: 1 INJECTION, POWDER, FOR SOLUTION INTRAMUSCULAR; INTRAVENOUS at 08:22

## 2022-10-20 RX ADMIN — LEVOTHYROXINE SODIUM 50 MCG: 50 TABLET ORAL at 05:53

## 2022-10-20 RX ADMIN — AMLODIPINE BESYLATE 10 MG: 5 TABLET ORAL at 05:52

## 2022-10-20 RX ADMIN — HYDRALAZINE HYDROCHLORIDE 100 MG: 25 TABLET, FILM COATED ORAL at 17:03

## 2022-10-20 RX ADMIN — HEPARIN SODIUM 1500 UNITS: 1000 INJECTION, SOLUTION INTRAVENOUS; SUBCUTANEOUS at 11:45

## 2022-10-20 RX ADMIN — LISINOPRIL 40 MG: 20 TABLET ORAL at 05:50

## 2022-10-20 RX ADMIN — DOXYCYCLINE 100 MG: 100 TABLET, FILM COATED ORAL at 17:03

## 2022-10-20 RX ADMIN — SEVELAMER CARBONATE 800 MG: 800 TABLET, FILM COATED ORAL at 10:31

## 2022-10-20 RX ADMIN — APIXABAN 2.5 MG: 2.5 TABLET, FILM COATED ORAL at 17:03

## 2022-10-20 RX ADMIN — HYDRALAZINE HYDROCHLORIDE 100 MG: 25 TABLET, FILM COATED ORAL at 05:51

## 2022-10-20 RX ADMIN — ROPINIROLE HYDROCHLORIDE 1 MG: 0.5 TABLET, FILM COATED ORAL at 05:50

## 2022-10-20 RX ADMIN — ASPIRIN 81 MG: 81 TABLET, COATED ORAL at 05:53

## 2022-10-20 RX ADMIN — CARVEDILOL 3.12 MG: 3.12 TABLET, FILM COATED ORAL at 17:04

## 2022-10-20 RX ADMIN — SEVELAMER CARBONATE 800 MG: 800 TABLET, FILM COATED ORAL at 08:22

## 2022-10-20 RX ADMIN — ROPINIROLE HYDROCHLORIDE 1 MG: 0.5 TABLET, FILM COATED ORAL at 17:03

## 2022-10-20 RX ADMIN — FAMOTIDINE 20 MG: 20 TABLET, FILM COATED ORAL at 17:03

## 2022-10-20 RX ADMIN — CARVEDILOL 3.12 MG: 3.12 TABLET, FILM COATED ORAL at 08:22

## 2022-10-20 ASSESSMENT — FIBROSIS 4 INDEX: FIB4 SCORE: 3.68

## 2022-10-20 ASSESSMENT — PAIN DESCRIPTION - PAIN TYPE
TYPE: ACUTE PAIN
TYPE: ACUTE PAIN

## 2022-10-20 NOTE — CARE PLAN
The patient is Stable - Low risk of patient condition declining or worsening    Shift Goals  Clinical Goals: dialysis, monitor O2 sats, rest  Patient Goals: rest    Progress made toward(s) clinical / shift goals: Pt scheduled for another dialysis today, currently in progress. Repeat CXR ordered for AM. Pt educated on plan of care and goal of weaning to room air for discharge. RT, MD, dialysis and staff RN all in communication about pt status. Taxi voucher for eventual discharge to be provided by . Pt ambulated with FWW to bathroom due to poor mobility and balance.    Patient is not progressing towards the following goals: Pt gas exchange worsening, oxygen needs increased from 4L NC to 13L Non Rebreather in 15 ours.      Problem: Respiratory  Goal: Patient will achieve/maintain optimum respiratory ventilation and gas exchange  Outcome: Not Progressing     Problem: Fluid Volume  Goal: Fluid volume balance will be maintained  Outcome: Not Progressing

## 2022-10-20 NOTE — PROCEDURES
Diagnosis: End-Stage Renal Disease admitted with SOB and volume overload. Patient seen and examined on hemodialysis during treatment. Patient is stable, tolerating hemodialysis. Denies chest pain and shortness of breath. Orders updated as needed. Please refer to flowsheet for full details.    Access: Left BC AVF  UF goal: 2.8L    Plan: Extra HD done today for ongoing hypoxia, oxygen improving with UF. Will continue with HD again tomorrow. Check weight daily.     Discussed with Dr. Katherny Villar MD  Nephrology   [facility]

## 2022-10-20 NOTE — DISCHARGE PLANNING
Outpatient Dialysis Note     Confirmed patient is active at:     [facility]  [address]  [place] [address]     Schedule: Mon, Wed, Fri   Time: 5:15 AM     Patient is seen by Dr. Villar in HD clinic.     Spoke with Clifton at facility who confirmed. Forwarded records for review.     Xitlaly Reyes   Dialysis Coordinator / [facility] [employer]  Ph#: [phone]

## 2022-10-20 NOTE — PROGRESS NOTES
12 hour chart pain check complete     Telemetry Shift Summary    Rhythm Sinus Rhythm  HR Range 61-83 low of 38  Ectopy rare to frequent PVC rare PAC rare couplet  Measurements .20/.08/.40        Normal Values  Rhythm SR  HR Range    Measurements 0.12-0.20 / 0.06-0.10  / 0.30-0.52

## 2022-10-20 NOTE — PROGRESS NOTES
[facility] Progress Note     Hemodialysis treatment ordered today per  x3  hours.   Treatment initiated bedside at 1147 ended at 1450 .      Patient tolerated tx; see e flow sheet for details.      Net UF 2800 mL.      Needles removed from access site. Dressings applied and sites held x 20 minutes; verified no bleeding. Positive bruit/thrill post tx.   Staff RN to monitor AVF/G for breakthrough bleeding. Should breakthrough bleeding occur, staff RN to apply pressure to access sites until bleeding resolved.   Notify Dialysis and Nephrologist for follow-up.     Report given to Primary RN TOMI Dailey

## 2022-10-20 NOTE — DIETARY
"Nutrition services: Day 1 of admit.  Tere Gallegos is a 71 y.o. female with admitting DX of ESRD (end stage renal disease) on dialysis (HCC) [N18.6, Z99.2]  Acute respiratory failure with hypoxia ([facility]) [J96.01]    Consult received for wt loss unsure amount/poor PO intake PTA. Attempted bedside visit x2 different times today; pt busy w/ other staff at time of both visits.    Assessment:  Height: 160 cm (5' 3\")  Weight: 59.5 kg (131 lb 2.8 oz)  Body mass index is 23.24 kg/m²., BMI classification: Normal  Diet/Intake: Renal; PO 50-75% x 3 meals, 0% x 1 meal    Evaluation:   Hx ESRD on HD, CHF, DM. Presents w/ acute respiratory failure w/ hypoxia d/t volume overload. Pt compliant w/ HD schedule; per MD, pt's dry weight target may need to be reduced from 140 lb. This may indicate loss of muscle/fat tissue.  Labs: A1c (9/2/22) 5.6%  MAR: senna, renvela  +13L O2 via non-rebreather  Last BM: 10/18  Wt hx not most reliable indicator of nutrition status given hx of CHF and ESRD on HD. Per chart hx, generally 140-145 lb in past year, as low as 134 lb until current admit wt 131.2 lb.    Malnutrition Risk: Unable to fully assess at this time; attempted to see pt x 2.    Recommendations/Plan:  Follow up w/ pt for nutrition-focused physical exam (NFPE) and PO hx interview as able prior to discharge.   Encourage intake of meals >50-75%  Document intake of all PO as % taken in ADL's to provide interdisciplinary communication across all shifts.   Monitor weight.  Nutrition rep will continue to see patient for ongoing meal and snack preferences.     RD following.      "

## 2022-10-20 NOTE — PROGRESS NOTES
Hospital Medicine Daily Progress Note    Date of Service  10/20/2022    Chief Complaint  Shortness of breath    Hospital Course  Teer Gallegos is a 71 y.o. female with end-stage renal disease on hemodialysis Monday Wednesday Friday, hypothyroidism, CAD, hypertension, admitted 10/19/2022 with shortness of breath and cough.  Chest x-ray showed pulmonary edema.  WBC 3300, creatinine within her ESRD levels.  She is requiring oxygen supplement as hypoxic on room air.  Nephrology was contacted for hemodialysis.  She was also felt to be in hypertensive urgency, and was placed on as needed IV labetalol along with continuation of her home antihypertensives.  She had hemodialysis with fluid removal.    Interval Problem Update  10/20/2022 - I reviewed the patient's chart. There were no significant overnight events. Remains hemodynamically stable and afebrile.  Supplemental oxygen needs increased since last night, on nonrebreather today.  Remains without leukocytosis but with borderline leukopenia at 3200.  Hemoglobin 7.9.  Platelet count 114,000.  Creatinine 5.27.  Electrolytes are normal.  Repeat chest x-ray (personally reviewed) showed increasing lung volumes however with worsening peripheral right basilar opacity from atelectasis or consolidation, and interstitial edema and cardiac enlargement as before.  She is maintaining sinus rhythm.  I checked a procalcitonin and it was 0.53.    > I have personally seen and examined the patient today.  She is comfortable out of proportion to her oxygen needs.  She denies any chest pain, shortness of breath, nausea, vomiting.  No cough.  No leg edema.      I have discussed this patient's plan of care and discharge plan at IDT rounds today with Case Management, Nursing, Nursing leadership, and other members of the IDT team.        Consultants/Specialty  nephrology    Code Status  Full Code    Disposition  Patient is not medically cleared for discharge.   Anticipate discharge  to to home with close outpatient follow-up.  I have placed the appropriate orders for post-discharge needs.    Review of Systems  ROS     Pertinent positives/negatives as mentioned above.     A complete review of systems was personally done by me. All other systems were negative.       Physical Exam  Temp:  [36.5 °C (97.7 °F)-37.6 °C (99.6 °F)] 37 °C (98.6 °F)  Pulse:  [72-84] 83  Resp:  [16-18] 16  BP: (135-152)/(43-76) 152/76  SpO2:  [79 %-98 %] 90 %    Physical Exam  Vitals reviewed.   Constitutional:       General: She is not in acute distress.     Appearance: Normal appearance. She is normal weight. She is not ill-appearing or diaphoretic.   HENT:      Head: Normocephalic and atraumatic.      Right Ear: External ear normal.      Left Ear: External ear normal.      Mouth/Throat:      Mouth: Mucous membranes are moist.      Pharynx: No oropharyngeal exudate or posterior oropharyngeal erythema.   Eyes:      General: No scleral icterus.     Extraocular Movements: Extraocular movements intact.      Conjunctiva/sclera: Conjunctivae normal.      Pupils: Pupils are equal, round, and reactive to light.   Cardiovascular:      Rate and Rhythm: Normal rate and regular rhythm.      Heart sounds: Normal heart sounds. No murmur heard.  Pulmonary:      Effort: Pulmonary effort is normal. No respiratory distress.      Breath sounds: No stridor. No wheezing, rhonchi or rales.      Comments: Diminished air entry B/L bases, otherwise clear to auscultation  Chest:      Chest wall: No tenderness.   Abdominal:      General: Bowel sounds are normal. There is no distension.      Palpations: Abdomen is soft. There is no mass.      Tenderness: There is no abdominal tenderness. There is no guarding or rebound.   Musculoskeletal:         General: No swelling. Normal range of motion.      Cervical back: Normal range of motion and neck supple. No rigidity. No muscular tenderness.      Right lower leg: No edema.      Left lower leg: No edema.    Lymphadenopathy:      Cervical: No cervical adenopathy.   Skin:     General: Skin is warm and dry.      Coloration: Skin is not jaundiced.      Findings: No rash.   Neurological:      General: No focal deficit present.      Mental Status: She is alert and oriented to person, place, and time. Mental status is at baseline.      Cranial Nerves: No cranial nerve deficit.   Psychiatric:         Mood and Affect: Mood normal.         Behavior: Behavior normal.         Thought Content: Thought content normal.         Judgment: Judgment normal.       Fluids    Intake/Output Summary (Last 24 hours) at 10/20/2022 1149  Last data filed at 10/20/2022 0822  Gross per 24 hour   Intake 600 ml   Output --   Net 600 ml       Laboratory  Recent Labs     10/19/22  0106 10/20/22  0243   WBC 3.3* 3.2*   RBC 2.94* 2.49*   HEMOGLOBIN 9.3* 7.9*   HEMATOCRIT 28.3* 24.6*   MCV 96.3 98.8*   MCH 31.6 31.7   MCHC 32.9* 32.1*   RDW 46.2 47.4   PLATELETCT 128* 114*   MPV 10.8 10.7     Recent Labs     10/19/22  0106 10/20/22  0243   SODIUM 139 138   POTASSIUM 3.8 4.9   CHLORIDE 94* 98   CO2 28 30   GLUCOSE 125* 102*   BUN 28* 17   CREATININE 6.93* 5.27*   CALCIUM 10.4* 9.6                   Imaging  DX-CHEST-PORTABLE (1 VIEW)   Final Result      Increasing lung volumes with worsening peripheral right basilar opacity from atelectasis or consolidation      Interstitial edema and cardiac enlargement as before      DX-CHEST-PORTABLE (1 VIEW)   Final Result      1.  Cardiomegaly with evidence of mild fluid overload..           Assessment/Plan  * Acute respiratory failure with hypoxia (HCC)- (present on admission)  Assessment & Plan  - Due to volume overload.  Suspect that her outpatient dialysis is not putting enough fluids, may need to change her dry weight target.  Has increased oxygen requirement today.  Chest x-ray today showed increasing lung volumes however with worsening peripheral right basilar opacity from atelectasis or consolidation, and  interstitial edema and cardiac enlargement as before.  -Discussed with nephrology for ongoing need for hemodialysis and fluid removal.  She will get another dialysis today.  -Cannot totally rule out pneumonia.  Procalcitonin was mildly elevated at 0.53, but this is also in the setting of her ESRD.  Start empiric antibiotics with IV ceftriaxone and doxycycline, complete 5 days course.  Trend procalcitonin.  -Repeat chest x-ray in the morning for interval changes.  -Continue respiratory support with RT protocol, continue oxygen supplement to keep saturations above 90%, wean as able.    Hypertensive emergency- (present on admission)  Assessment & Plan  - Patient's hypertension has been significantly elevated on admission, may have contributed to pulmonary edema.  -Blood pressure trend better with resumption of her home antihypertensives, and resumption of hemodialysis.  Continue home Norvasc, hydralazine, Coreg, and lisinopril.  Monitor blood pressure trend closely.  May need further adjustments if blood pressure fluctuates significantly.  Continue as needed IV labetalol for significant hypertension.    GERD (gastroesophageal reflux disease)- (present on admission)  Assessment & Plan  - Continue home Pepcid    Paroxysmal A-fib (HCC)- (present on admission)  Assessment & Plan  - Maintaining sinus rhythm.  Continue home Coreg.  Continue anticoagulation with Eliquis.      Pancytopenia (HCC)- (present on admission)  Assessment & Plan  - Chronic, no sign of gross bleeding    Acquired hypothyroidism- (present on admission)  Assessment & Plan  - Continue home Synthroid at 50 mcg daily  -Most recent TSH was approximately 2 months ago was normal at 2.6    ESRD (end stage renal disease) on dialysis (HCC)- (present on admission)  Assessment & Plan  - Usual outpatient hemodialysis on Mondays, Wednesday, and Friday.  Per the patient, she has not missed any dialysis days.  -May need to change her dry weight target.  -Nephrology on  board, she will get another round of hemodialysis today.    RLS (restless legs syndrome)- (present on admission)  Assessment & Plan  - Continue home Requip       VTE prophylaxis: therapeutic anticoagulation with Eliquis

## 2022-10-20 NOTE — PROGRESS NOTES
Telemetry Shift Summary    Rhythm SR  HR Range 73-78  Ectopy rPAC/PVC, r Coup  Measurements .16/.08/.42        Normal Values  Rhythm SR  HR Range    Measurements 0.12-0.20 / 0.06-0.10  / 0.30-0.52

## 2022-10-20 NOTE — THERAPY
Occupational Therapy     Patient Name: Tere Gallegos  Age:  71 y.o., Sex:  female  Medical Record #: [id number]  Today's Date: 10/20/2022    Discussed missed therapy with RN       10/20/22 5738   Interdisciplinary Plan of Care Collaboration   Collaboration Comments OT orders rec'd.  Pt has has increased O2 needs requiring non-rebreather mask, and pt is currently undergoing dialysis.  Per RN pt will likely be more medically stable and better able to tolerate therapy evaluation in the morning.  OT will hold today and eval as appropriate.

## 2022-10-21 ENCOUNTER — APPOINTMENT (OUTPATIENT)
Dept: RADIOLOGY | Facility: MEDICAL CENTER | Age: 72
DRG: 291 | End: 2022-10-21
Attending: INTERNAL MEDICINE
Payer: MEDICARE

## 2022-10-21 LAB
ANION GAP SERPL CALC-SCNC: 10 MMOL/L (ref 7–16)
BUN SERPL-MCNC: 18 MG/DL (ref 8–22)
CALCIUM SERPL-MCNC: 9.7 MG/DL (ref 8.4–10.2)
CHLORIDE SERPL-SCNC: 100 MMOL/L (ref 96–112)
CO2 SERPL-SCNC: 30 MMOL/L (ref 20–33)
CREAT SERPL-MCNC: 4.06 MG/DL (ref 0.5–1.4)
GFR SERPLBLD CREATININE-BSD FMLA CKD-EPI: 11 ML/MIN/1.73 M 2
GLUCOSE SERPL-MCNC: 82 MG/DL (ref 65–99)
POTASSIUM SERPL-SCNC: 4.2 MMOL/L (ref 3.6–5.5)
PROCALCITONIN SERPL-MCNC: 0.59 NG/ML
SODIUM SERPL-SCNC: 140 MMOL/L (ref 135–145)

## 2022-10-21 PROCEDURE — 700102 HCHG RX REV CODE 250 W/ 637 OVERRIDE(OP): Performed by: INTERNAL MEDICINE

## 2022-10-21 PROCEDURE — 99233 SBSQ HOSP IP/OBS HIGH 50: CPT | Performed by: INTERNAL MEDICINE

## 2022-10-21 PROCEDURE — 700111 HCHG RX REV CODE 636 W/ 250 OVERRIDE (IP): Performed by: INTERNAL MEDICINE

## 2022-10-21 PROCEDURE — 74176 CT ABD & PELVIS W/O CONTRAST: CPT

## 2022-10-21 PROCEDURE — 94760 N-INVAS EAR/PLS OXIMETRY 1: CPT

## 2022-10-21 PROCEDURE — A9270 NON-COVERED ITEM OR SERVICE: HCPCS | Performed by: INTERNAL MEDICINE

## 2022-10-21 PROCEDURE — 80048 BASIC METABOLIC PNL TOTAL CA: CPT

## 2022-10-21 PROCEDURE — 84145 PROCALCITONIN (PCT): CPT

## 2022-10-21 PROCEDURE — 90935 HEMODIALYSIS ONE EVALUATION: CPT

## 2022-10-21 PROCEDURE — 36415 COLL VENOUS BLD VENIPUNCTURE: CPT

## 2022-10-21 PROCEDURE — 700105 HCHG RX REV CODE 258: Performed by: INTERNAL MEDICINE

## 2022-10-21 PROCEDURE — 71045 X-RAY EXAM CHEST 1 VIEW: CPT

## 2022-10-21 PROCEDURE — 770006 HCHG ROOM/CARE - MED/SURG/GYN SEMI*

## 2022-10-21 PROCEDURE — 97535 SELF CARE MNGMENT TRAINING: CPT

## 2022-10-21 PROCEDURE — 700117 HCHG RX CONTRAST REV CODE 255: Performed by: INTERNAL MEDICINE

## 2022-10-21 PROCEDURE — 97162 PT EVAL MOD COMPLEX 30 MIN: CPT

## 2022-10-21 PROCEDURE — 71275 CT ANGIOGRAPHY CHEST: CPT

## 2022-10-21 RX ADMIN — LISINOPRIL 40 MG: 20 TABLET ORAL at 06:17

## 2022-10-21 RX ADMIN — FAMOTIDINE 20 MG: 20 TABLET, FILM COATED ORAL at 17:04

## 2022-10-21 RX ADMIN — ASPIRIN 81 MG: 81 TABLET, COATED ORAL at 06:15

## 2022-10-21 RX ADMIN — ROPINIROLE HYDROCHLORIDE 1 MG: 0.5 TABLET, FILM COATED ORAL at 06:10

## 2022-10-21 RX ADMIN — ROPINIROLE HYDROCHLORIDE 1 MG: 0.5 TABLET, FILM COATED ORAL at 17:03

## 2022-10-21 RX ADMIN — APIXABAN 2.5 MG: 2.5 TABLET, FILM COATED ORAL at 17:04

## 2022-10-21 RX ADMIN — HYDRALAZINE HYDROCHLORIDE 100 MG: 25 TABLET, FILM COATED ORAL at 17:04

## 2022-10-21 RX ADMIN — LEVOTHYROXINE SODIUM 50 MCG: 50 TABLET ORAL at 06:17

## 2022-10-21 RX ADMIN — FAMOTIDINE 20 MG: 20 TABLET, FILM COATED ORAL at 06:15

## 2022-10-21 RX ADMIN — SENNOSIDES AND DOCUSATE SODIUM 2 TABLET: 50; 8.6 TABLET ORAL at 06:14

## 2022-10-21 RX ADMIN — EPOETIN ALFA-EPBX 3000 UNITS: 3000 INJECTION, SOLUTION INTRAVENOUS; SUBCUTANEOUS at 12:45

## 2022-10-21 RX ADMIN — HYDRALAZINE HYDROCHLORIDE 100 MG: 25 TABLET, FILM COATED ORAL at 06:16

## 2022-10-21 RX ADMIN — DOXYCYCLINE 100 MG: 100 TABLET, FILM COATED ORAL at 06:11

## 2022-10-21 RX ADMIN — IOHEXOL 20 ML: 240 INJECTION, SOLUTION INTRATHECAL; INTRAVASCULAR; INTRAVENOUS; ORAL at 09:58

## 2022-10-21 RX ADMIN — AMLODIPINE BESYLATE 10 MG: 5 TABLET ORAL at 06:15

## 2022-10-21 RX ADMIN — SEVELAMER CARBONATE 800 MG: 800 TABLET, FILM COATED ORAL at 07:57

## 2022-10-21 RX ADMIN — SENNOSIDES AND DOCUSATE SODIUM 2 TABLET: 50; 8.6 TABLET ORAL at 17:03

## 2022-10-21 RX ADMIN — SEVELAMER CARBONATE 800 MG: 800 TABLET, FILM COATED ORAL at 17:03

## 2022-10-21 RX ADMIN — SEVELAMER CARBONATE 800 MG: 800 TABLET, FILM COATED ORAL at 10:42

## 2022-10-21 RX ADMIN — DOXYCYCLINE 100 MG: 100 TABLET, FILM COATED ORAL at 17:04

## 2022-10-21 RX ADMIN — CEFTRIAXONE SODIUM 1 G: 1 INJECTION, POWDER, FOR SOLUTION INTRAMUSCULAR; INTRAVENOUS at 06:18

## 2022-10-21 RX ADMIN — IOHEXOL 60 ML: 350 INJECTION, SOLUTION INTRAVENOUS at 16:50

## 2022-10-21 RX ADMIN — APIXABAN 2.5 MG: 2.5 TABLET, FILM COATED ORAL at 06:15

## 2022-10-21 RX ADMIN — CARVEDILOL 3.12 MG: 3.12 TABLET, FILM COATED ORAL at 07:57

## 2022-10-21 ASSESSMENT — COGNITIVE AND FUNCTIONAL STATUS - GENERAL
MOVING FROM LYING ON BACK TO SITTING ON SIDE OF FLAT BED: A LITTLE
MOBILITY SCORE: 19
MOVING TO AND FROM BED TO CHAIR: A LITTLE
SUGGESTED CMS G CODE MODIFIER MOBILITY: CK
CLIMB 3 TO 5 STEPS WITH RAILING: A LITTLE
STANDING UP FROM CHAIR USING ARMS: A LITTLE
WALKING IN HOSPITAL ROOM: A LITTLE

## 2022-10-21 ASSESSMENT — GAIT ASSESSMENTS
DISTANCE (FEET): 40
ASSISTIVE DEVICE: FRONT WHEEL WALKER
GAIT LEVEL OF ASSIST: STANDBY ASSIST

## 2022-10-21 ASSESSMENT — FIBROSIS 4 INDEX
FIB4 SCORE: 3.65
FIB4 SCORE: 3.65

## 2022-10-21 ASSESSMENT — ENCOUNTER SYMPTOMS
SHORTNESS OF BREATH: 0
ABDOMINAL PAIN: 0
FEVER: 0

## 2022-10-21 ASSESSMENT — PAIN DESCRIPTION - PAIN TYPE
TYPE: ACUTE PAIN
TYPE: ACUTE PAIN

## 2022-10-21 NOTE — PROGRESS NOTES
Pt increased O2 needs from 4L NC last evening to 13L NRB this MD saji notified and another dialysis ordered, 2.8L removed, attempting to wean pt however still requiring 10L oxymask to maintain 90%.      services used to update family on events of day and current goals as well as all medications given this evening. No further questions by pt or family.

## 2022-10-21 NOTE — FACE TO FACE
Face to Face Supporting Documentation - Home Health    The encounter with this patient was in whole or in part the primary reason for home health admission.    Date of encounter:   Patient:                    MRN:                       YOB: 2023  Tere Gallegos  [id number]  1950     Home health to see patient for:  Skilled Nursing care for assessment, interventions & education, Physical Therapy evaluation and treatment, and Occupational therapy evaluation and treatment    Skilled need for:  Exacerbation of Chronic Disease State ESRD, respiratory failure with hypoxemia    Skilled nursing interventions to include:  Comment:   Skilled nursing care for assessment, intervention and education.       Homebound status evidenced by:  Needs the assistance of another person in order to leave the home. Leaving home requires a considerable and taxing effort. There is a normal inability to leave the home.    Community Physician to provide follow up care: ANGELITA Concepcion     Optional Interventions? No      I certify the face to face encounter for this home health care referral meets the CMS requirements and the encounter/clinical assessment with the patient was, in whole, or in part, for the medical condition(s) listed above, which is the primary reason for home health care. Based on my clinical findings: the service(s) are medically necessary, support the need for home health care, and the homebound criteria are met.  I certify that this patient has had a face to face encounter by myself.  Klaus Campa M.D. - NPI: [id number]

## 2022-10-21 NOTE — PROGRESS NOTES
[facility] Services Progress Note     Hemodialysis treatment ordered today per  x3  hours.   Treatment initiated at 1148 ended at 1500.      Per MD verbal order, OK to hold heparin for this order and change to 16 gauge needles.   Patient tolerated tx; see e flow sheet for details.      Net UF 3000 mL.      Needles removed from access site. Dressings applied and sites held x10  minutes; verified no bleeding. Positive bruit/thrill post tx.   Staff RN to monitor AVF/G for breakthrough bleeding. Should breakthrough bleeding occur, staff RN to apply pressure to access sites until bleeding resolved.   Notify Dialysis and Nephrologist for follow-up.     Report given to Primary TRAVIS Dailey

## 2022-10-21 NOTE — THERAPY
Missed Therapy     Patient Name: Tere Gallegos  Age:  71 y.o., Sex:  female  Medical Record #: [id number]  Today's Date: 10/21/2022    Discussed missed therapy with CNA/Nursing       10/21/22 8436   Interdisciplinary Plan of Care Collaboration   IDT Collaboration with  Certified Nursing Assistant;Nursing   Collaboration Comments Attempted OT eval - patient prepping for imaging.  Will attempt OT eval later.

## 2022-10-21 NOTE — CARE PLAN
The patient is Stable - Low risk of patient condition declining or worsening    Shift Goals  Clinical Goals: Dialysis, CT, titrate O2  Patient Goals: discharge  Family Goals: No family present    Progress made toward(s) clinical / shift goals:  Pt tolerated fourth dialysis this week without complication, 3L removed, scheduled for CT to rule out PE this afternoon. Able to ambulate with FWW for PT with O2.     Patient is not progressing towards the following goals: Unable to titrate pt on O2 lower than 10L Oxymask. Will continue to titrate as pt tolerates.       Problem: Respiratory  Goal: Patient will achieve/maintain optimum respiratory ventilation and gas exchange  Outcome: Not Progressing     Problem: Fluid Volume  Goal: Fluid volume balance will be maintained  Outcome: Progressing

## 2022-10-21 NOTE — PROGRESS NOTES
Bedside report received from Emily ROBLES and assumed Pt's cares. Call light within reach. Safety measures in place.

## 2022-10-21 NOTE — PROGRESS NOTES
Hospital Medicine Daily Progress Note    Date of Service  10/21/2022    Chief Complaint  Shortness of breath    Hospital Course  Tere Gallegos is a 71 y.o. female with end-stage renal disease on hemodialysis Monday Wednesday Friday, hypothyroidism, CAD, hypertension, PAF on anticoagulation, admitted 10/19/2022 with shortness of breath and cough.  Chest x-ray showed pulmonary edema.  WBC 3300, creatinine within her ESRD levels.  She is requiring oxygen supplement as hypoxic on room air.  Nephrology was contacted for hemodialysis.  She was also felt to be in hypertensive urgency, and was placed on as needed IV labetalol along with continuation of her home antihypertensives.  She also had increased oxygen requirement.  She had hemodialysis, with extra sessions for fluid removal.  Repeat chest x-rays showed left pulmonary infiltrates despite volume removal with dialysis, and her procalcitonin was elevated, prompting initiation of antibiotics for possible pneumonia.    Interval Problem Update  10/21/2022 - I reviewed the patient's chart today. Uneventful night. VSS. Afebrile.  On 15 L oxygen mask, desaturates easily with attempts to wean.  Creatinine 4.06.  Sodium, calcium, and potassium levels are normal.  Procalcitonin 0.59.  Repeat chest x-ray showed hazy left pulmonary infiltrates, with trace left pleural effusion, and lucency beneath the right diaphragm, raising concern for intra-abdominal free air. CT of the abdomen was done which showed no free intraperitoneal air, with no evidence of bowel obstruction or acute appendicitis, and no free fluid, with bilateral lung base atelectasis/edema and small pleural effusions.  Plan for another hemodialysis today.    > I have personally seen and examined the patient today.  She is comfortable.  She states she is not short of breath.  No nausea or vomiting.  No abdominal pain.  No bowel movement changes.    I have discussed this patient's plan of care and discharge  plan at IDT rounds today with Case Management, Nursing, Nursing leadership, and other members of the IDT team.        Consultants/Specialty  nephrology    Code Status  Full Code    Disposition  Patient is not medically cleared for discharge.   Anticipate discharge to to home with organized home healthcare and close outpatient follow-up.  PT/OT recommending home health services.  I have placed the appropriate orders for post-discharge needs.    Review of Systems  ROS     Pertinent positives/negatives as mentioned above.     A complete review of systems was personally done by me. All other systems were negative.       Physical Exam  Temp:  [36.3 °C (97.4 °F)-37.5 °C (99.5 °F)] 36.3 °C (97.4 °F)  Pulse:  [59-74] 64  Resp:  [16-24] 18  BP: (129-155)/(40-56) 131/42  SpO2:  [85 %-98 %] 94 %    Physical Exam  Vitals reviewed.   Constitutional:       General: She is not in acute distress.     Appearance: Normal appearance. She is normal weight. She is not ill-appearing or diaphoretic.   HENT:      Head: Normocephalic and atraumatic.      Right Ear: External ear normal.      Left Ear: External ear normal.      Mouth/Throat:      Mouth: Mucous membranes are moist.      Pharynx: No oropharyngeal exudate or posterior oropharyngeal erythema.   Eyes:      General: No scleral icterus.     Extraocular Movements: Extraocular movements intact.      Conjunctiva/sclera: Conjunctivae normal.      Pupils: Pupils are equal, round, and reactive to light.   Cardiovascular:      Rate and Rhythm: Normal rate and regular rhythm.      Heart sounds: Normal heart sounds. No murmur heard.  Pulmonary:      Effort: Pulmonary effort is normal. No respiratory distress.      Breath sounds: No stridor. No wheezing, rhonchi or rales.      Comments: Diminished air entry B/L bases, otherwise clear to auscultation  Chest:      Chest wall: No tenderness.   Abdominal:      General: Bowel sounds are normal. There is no distension.      Palpations: Abdomen is  soft. There is no mass.      Tenderness: There is no abdominal tenderness. There is no guarding or rebound.   Musculoskeletal:         General: No swelling. Normal range of motion.      Cervical back: Normal range of motion and neck supple. No rigidity. No muscular tenderness.      Right lower leg: No edema.      Left lower leg: No edema.   Lymphadenopathy:      Cervical: No cervical adenopathy.   Skin:     General: Skin is warm and dry.      Coloration: Skin is not jaundiced.      Findings: No rash.   Neurological:      General: No focal deficit present.      Mental Status: She is alert and oriented to person, place, and time. Mental status is at baseline.      Cranial Nerves: No cranial nerve deficit.   Psychiatric:         Mood and Affect: Mood normal.         Behavior: Behavior normal.         Thought Content: Thought content normal.         Judgment: Judgment normal.     I have performed the physical examination today 10/21/2022.  In review of yesterday's note, there are no new changes except as documented above.      Fluids    Intake/Output Summary (Last 24 hours) at 10/21/2022 1259  Last data filed at 10/21/2022 0600  Gross per 24 hour   Intake 860 ml   Output 3300 ml   Net -2440 ml       Laboratory  Recent Labs     10/19/22  0106 10/20/22  0243 10/20/22  1231   WBC 3.3* 3.2* 3.3*   RBC 2.94* 2.49* 2.46*   HEMOGLOBIN 9.3* 7.9* 7.7*   HEMATOCRIT 28.3* 24.6* 24.2*   MCV 96.3 98.8* 98.4*   MCH 31.6 31.7 31.3   MCHC 32.9* 32.1* 31.8*   RDW 46.2 47.4 47.5   PLATELETCT 128* 114* 115*   MPV 10.8 10.7 10.9     Recent Labs     10/19/22  0106 10/20/22  0243 10/21/22  0134   SODIUM 139 138 140   POTASSIUM 3.8 4.9 4.2   CHLORIDE 94* 98 100   CO2 28 30 30   GLUCOSE 125* 102* 82   BUN 28* 17 18   CREATININE 6.93* 5.27* 4.06*   CALCIUM 10.4* 9.6 9.7                   Imaging  CT-ABDOMEN-PELVIS W/O   Final Result      1.  No free intraperitoneal air.      2.  No evidence of bowel obstruction or acute appendicitis. No free  fluid.      3.  Surgical absence left kidney. No right hydronephrosis or urolithiasis.      4.  Bilateral lung base atelectasis/edema and small pleural effusions.      DX-CHEST-PORTABLE (1 VIEW)   Final Result         1.  Hazy left pulmonary infiltrates   2.  Trace left pleural effusion   3.  Cardiomegaly   4.  Lucency beneath the right hemidiaphragm, appearance raising concern for intra-abdominal free air. Further evaluation with CT of the abdomen for further characterization.      These findings were discussed with the patient's clinician, Klaus Campa, on 10/21/2022 7:07 AM.      DX-CHEST-PORTABLE (1 VIEW)   Final Result      Increasing lung volumes with worsening peripheral right basilar opacity from atelectasis or consolidation      Interstitial edema and cardiac enlargement as before      DX-CHEST-PORTABLE (1 VIEW)   Final Result      1.  Cardiomegaly with evidence of mild fluid overload..      CT-CTA CHEST PULMONARY ARTERY W/ RECONS    (Results Pending)        Assessment/Plan  * Acute respiratory failure with hypoxia (HCC)- (present on admission)  Assessment & Plan  - Initially felt to be due to volume overload.  Suspect that her outpatient dialysis is not putting enough fluids, may need to change her dry weight target.  However, despite adequate hemodialysis and being euvolemic, she continues to have high oxygen requirement.  Chest imaging showing bilateral lung base atelectasis/edema, and small pleural effusions.  Procalcitonin is elevated, but this is also in setting of her ESRD.  Unable to totally rule out pneumonia.  She is on anticoagulation for her PAF, but query if she has PE.  -Continue hemodialysis per nephrology.  -Continue empiric antibiotics for pneumonia.  Continue IV Unasyn, and doxycycline.  Trend procalcitonin.  -I will obtain a CTA of the chest to rule out PE, which will also hopefully evaluate the lung parenchyma.  Contrast use cleared by nephrology.  -Continue respiratory support with RT  protocol, continue oxygen supplement to keep saturations above 90%, wean as able.    Hypertensive emergency- (present on admission)  Assessment & Plan  - Patient's hypertension has been significantly elevated on admission, may have contributed to pulmonary edema.  -Blood pressure trend better with resumption of her home antihypertensives, and resumption of hemodialysis.  Continue home Norvasc, hydralazine, Coreg, and lisinopril.  Monitor blood pressure trend closely.  May need further adjustments if blood pressure fluctuates significantly.  Continue as needed IV labetalol for significant hypertension.    GERD (gastroesophageal reflux disease)- (present on admission)  Assessment & Plan  - Continue home Pepcid    Paroxysmal A-fib (HCC)- (present on admission)  Assessment & Plan  - Maintaining sinus rhythm.  Continue home Coreg.  Continue anticoagulation with Eliquis.      Pancytopenia (HCC)- (present on admission)  Assessment & Plan  - Chronic, no sign of gross bleeding    Acquired hypothyroidism- (present on admission)  Assessment & Plan  - Continue home Synthroid at 50 mcg daily  -Most recent TSH was approximately 2 months ago was normal at 2.6    ESRD (end stage renal disease) on dialysis ([facility])- (present on admission)  Assessment & Plan  - Usual outpatient hemodialysis on Mondays, Wednesday, and Friday.  Per the patient, she has not missed any dialysis days.  -May need to change her dry weight target.  -Nephrology on board.    RLS (restless legs syndrome)- (present on admission)  Assessment & Plan  - Continue home Requip       VTE prophylaxis: therapeutic anticoagulation with Eliquis

## 2022-10-21 NOTE — PROGRESS NOTES
Telemetry shift summary:  Rhythm: SR, SB     HR Range: 50s to 60s      Measurements from strip printed at 00:56:50   KY: 0.16   QRS 0.08   QT 0.44     Ectopies: Rare PVC and PAC.

## 2022-10-21 NOTE — THERAPY
Physical Therapy   Initial Evaluation     Patient Name: Tere Gallegos  Age:  71 y.o., Sex:  female  Medical Record #: [id number]  Today's Date: 10/21/2022     Precautions  Precautions: (P) Fall Risk    Assessment  Patient is 71 y.o. Danish speaking female with a diagnosis of end-stage renal disease on hemodialysis MWF, hypothyroidism, CAD, hypertension, admitted 10/19/22 with SOB and couch. X-ray showed pulmonary edema. Pt noted to have slight LOB without AD. High 02 demands pt educated on 02 cord management. Recommend home with FWW and HHPT to address slight balance and strength issue.  Son available to assist. Will follow pt during acute stay, monitor 02 sats and need for 02 during gait.       Plan    Recommend Physical Therapy 3 times per week until therapy goals are met for the following treatments:  Gait Training, Neuro Re-Education / Balance, Self Care/Home Evaluation, Therapeutic Activities, and Therapeutic Exercises    DC Equipment Recommendations: (P) Front-Wheel Walker  Discharge Recommendations: (P) Recommend home health for continued physical therapy services          10/21/22 1002   Charge Group   PT Evaluation PT Evaluation Mod   PT Self Care / Home Evaluation  1   Total Time Spent   PT Total Time Yes   PT Evaluation Time Spent (Mins) 20   PT Self Care/Home Evaluation Time Spent (Mins) 10   Initial Contact Note    Initial Contact Note Order Received and Verified, Physical Therapy Evaluation in Progress with Full Report to Follow.   Precautions   Precautions Fall Risk   Vitals   O2 (LPM) 14   O2 Delivery Device Oxymask   Pain 0 - 10 Group   Therapist Pain Assessment 0;Nurse Notified;Post Activity Pain Same as Prior to Activity   Prior Living Situation   Prior Services None   Housing / Facility 1 Story Apartment / Condo   Steps Into Home 1   Steps In Home 0   Equipment Owned None   Lives with - Patient's Self Care Capacity Adult Children   Comments pt lives with son who works during the day    Prior Level of Functional Mobility   Bed Mobility Independent   Transfer Status Independent   Ambulation Independent   Distance Ambulation (Feet)   (200)   Assistive Devices Used None   Comments pt indep with self care   History of Falls   History of Falls No   Cognition    Level of Consciousness Alert   Comments Setswana speaking used    Passive ROM Lower Body   Passive ROM Lower Body WDL   Active ROM Lower Body    Active ROM Lower Body  WDL   Strength Lower Body   Lower Body Strength  X   Comments LLE slightly weaker in quad and hip flexor, no hx of injury. C/o of restless L leg at night   Sensation Lower Body   Lower Extremity Sensation   WDL   Lower Body Muscle Tone   Lower Body Muscle Tone  WDL   Neurological Concerns   Neurological Concerns No   Coordination Lower Body    Coordination Lower Body  WDL   Vision   Vision Comments denies   Balance Assessment   Sitting Balance (Static) Good   Sitting Balance (Dynamic) Fair +   Standing Balance (Static) Fair   Standing Balance (Dynamic) Fair -   Weight Shift Sitting Good   Weight Shift Standing Fair   Comments sllight LOB to L without AD   Gait Analysis   Gait Level Of Assist Standby Assist   Assistive Device Front Wheel Walker   Distance (Feet) 40   # of Times Distance was Traveled 3   # of Stairs Climbed 0   Weight Bearing Status full   Comments LOB without AD, recommend use of FWW   Bed Mobility    Supine to Sit Supervised   Sit to Supine Supervised   Scooting Independent   Rolling Independent   Functional Mobility   Sit to Stand Standby Assist   Bed, Chair, Wheelchair Transfer Contact Guard Assist   How much difficulty does the patient currently have...   Turning over in bed (including adjusting bedclothes, sheets and blankets)? 4   Sitting down on and standing up from a chair with arms (e.g., wheelchair, bedside commode, etc.) 3   Moving from lying on back to sitting on the side of the bed? 3   How much help from another person does the patient  currently need...   Moving to and from a bed to a chair (including a wheelchair)? 3   Need to walk in a hospital room? 3   Climbing 3-5 steps with a railing? 3   6 clicks Mobility Score 19   Activity Tolerance   Sitting Edge of Bed 2 mins , 5 mins   Standing 4 min x 3   Edema / Skin Assessment   Edema / Skin  Not Assessed   Patient / Family Goals    Patient / Family Goal #1 home   Short Term Goals    Short Term Goal # 1 in 6 V pt will perform transfers with mod indep using fWW   Short Term Goal # 2 in 6 V pt will amb using FWW x 100 with supervison.   Education Group   Education Provided Role of Physical Therapist;Exercises - Seated   Role of Physical Therapist Patient Response Patient;Acceptance;Explanation;Verbal Demonstration   Exercises - Seated Patient Response Patient;Acceptance;Verbal Demonstration;Demonstration;Action Demonstration   Additional Comments BLE ther ex seated EOB   Problem List    Problems Decreased Activity Tolerance;Impaired Balance;Impaired Ambulation   Anticipated Discharge Equipment and Recommendations   DC Equipment Recommendations Front-Wheel Walker   Discharge Recommendations Recommend home health for continued physical therapy services   Interdisciplinary Plan of Care Collaboration   IDT Collaboration with  Nursing   Patient Position at End of Therapy   (pt taken to test via W/C)   Collaboration Comments re; eval   Session Information   Date / Session Number  10/22/22 -1 ( 1/3, 10/27)

## 2022-10-21 NOTE — THERAPY
Missed Therapy     Patient Name: Tere Gallegos  Age:  71 y.o., Sex:  female  Medical Record #: [id number]  Today's Date: 10/21/2022    Discussed missed therapy with Nursing       10/21/22 1040   Interdisciplinary Plan of Care Collaboration   IDT Collaboration with  Nursing   Collaboration Comments Attempted OT eval.  Pt on dialysis.  Will attempt OT eval later.

## 2022-10-21 NOTE — PROGRESS NOTES
12 hour chart pain check complete       Telemetry Shift Summary    Rhythm Sinus Rhythm  HR Range 64-68  Ectopy rare PVC rare couplet  Measurements .20/.08/.44        Normal Values  Rhythm SR  HR Range    Measurements 0.12-0.20 / 0.06-0.10  / 0.30-0.52

## 2022-10-22 ENCOUNTER — APPOINTMENT (OUTPATIENT)
Dept: RADIOLOGY | Facility: MEDICAL CENTER | Age: 72
DRG: 291 | End: 2022-10-22
Attending: INTERNAL MEDICINE
Payer: MEDICARE

## 2022-10-22 PROBLEM — R91.8 LUNG NODULES: Status: ACTIVE | Noted: 2022-10-22

## 2022-10-22 LAB
ANION GAP SERPL CALC-SCNC: 11 MMOL/L (ref 7–16)
BUN SERPL-MCNC: 19 MG/DL (ref 8–22)
CALCIUM SERPL-MCNC: 9.7 MG/DL (ref 8.4–10.2)
CHLORIDE SERPL-SCNC: 96 MMOL/L (ref 96–112)
CO2 SERPL-SCNC: 28 MMOL/L (ref 20–33)
CREAT SERPL-MCNC: 3.91 MG/DL (ref 0.5–1.4)
ERYTHROCYTE [DISTWIDTH] IN BLOOD BY AUTOMATED COUNT: 45.7 FL (ref 35.9–50)
GFR SERPLBLD CREATININE-BSD FMLA CKD-EPI: 12 ML/MIN/1.73 M 2
GLUCOSE SERPL-MCNC: 74 MG/DL (ref 65–99)
HCT VFR BLD AUTO: 24.4 % (ref 37–47)
HGB BLD-MCNC: 7.9 G/DL (ref 12–16)
MCH RBC QN AUTO: 31.3 PG (ref 27–33)
MCHC RBC AUTO-ENTMCNC: 32.4 G/DL (ref 33.6–35)
MCV RBC AUTO: 96.8 FL (ref 81.4–97.8)
PLATELET # BLD AUTO: 138 K/UL (ref 164–446)
PMV BLD AUTO: 10.8 FL (ref 9–12.9)
POTASSIUM SERPL-SCNC: 4.5 MMOL/L (ref 3.6–5.5)
PROCALCITONIN SERPL-MCNC: 1 NG/ML
RBC # BLD AUTO: 2.52 M/UL (ref 4.2–5.4)
SODIUM SERPL-SCNC: 135 MMOL/L (ref 135–145)
WBC # BLD AUTO: 2.5 K/UL (ref 4.8–10.8)

## 2022-10-22 PROCEDURE — 71045 X-RAY EXAM CHEST 1 VIEW: CPT

## 2022-10-22 PROCEDURE — 700102 HCHG RX REV CODE 250 W/ 637 OVERRIDE(OP): Performed by: INTERNAL MEDICINE

## 2022-10-22 PROCEDURE — 700111 HCHG RX REV CODE 636 W/ 250 OVERRIDE (IP): Performed by: INTERNAL MEDICINE

## 2022-10-22 PROCEDURE — A9270 NON-COVERED ITEM OR SERVICE: HCPCS | Performed by: INTERNAL MEDICINE

## 2022-10-22 PROCEDURE — 99233 SBSQ HOSP IP/OBS HIGH 50: CPT | Performed by: INTERNAL MEDICINE

## 2022-10-22 PROCEDURE — 85027 COMPLETE CBC AUTOMATED: CPT

## 2022-10-22 PROCEDURE — 770006 HCHG ROOM/CARE - MED/SURG/GYN SEMI*

## 2022-10-22 PROCEDURE — 80048 BASIC METABOLIC PNL TOTAL CA: CPT

## 2022-10-22 PROCEDURE — 36415 COLL VENOUS BLD VENIPUNCTURE: CPT

## 2022-10-22 PROCEDURE — 84145 PROCALCITONIN (PCT): CPT

## 2022-10-22 PROCEDURE — 90935 HEMODIALYSIS ONE EVALUATION: CPT

## 2022-10-22 RX ORDER — FLUTICASONE PROPIONATE 50 MCG
2 SPRAY, SUSPENSION (ML) NASAL DAILY
Status: DISCONTINUED | OUTPATIENT
Start: 2022-10-22 | End: 2022-10-23 | Stop reason: HOSPADM

## 2022-10-22 RX ORDER — AMOXICILLIN AND CLAVULANATE POTASSIUM 500; 125 MG/1; MG/1
1 TABLET, FILM COATED ORAL DAILY
Status: DISCONTINUED | OUTPATIENT
Start: 2022-10-22 | End: 2022-10-23 | Stop reason: HOSPADM

## 2022-10-22 RX ORDER — GUAIFENESIN 600 MG/1
600 TABLET, EXTENDED RELEASE ORAL EVERY 12 HOURS
Status: DISCONTINUED | OUTPATIENT
Start: 2022-10-22 | End: 2022-10-23 | Stop reason: HOSPADM

## 2022-10-22 RX ADMIN — ROPINIROLE HYDROCHLORIDE 1 MG: 0.5 TABLET, FILM COATED ORAL at 05:02

## 2022-10-22 RX ADMIN — SENNOSIDES AND DOCUSATE SODIUM 2 TABLET: 50; 8.6 TABLET ORAL at 18:04

## 2022-10-22 RX ADMIN — SEVELAMER CARBONATE 800 MG: 800 TABLET, FILM COATED ORAL at 18:06

## 2022-10-22 RX ADMIN — HYDRALAZINE HYDROCHLORIDE 100 MG: 25 TABLET, FILM COATED ORAL at 05:02

## 2022-10-22 RX ADMIN — APIXABAN 2.5 MG: 2.5 TABLET, FILM COATED ORAL at 05:03

## 2022-10-22 RX ADMIN — ROPINIROLE HYDROCHLORIDE 1 MG: 0.5 TABLET, FILM COATED ORAL at 18:04

## 2022-10-22 RX ADMIN — FAMOTIDINE 20 MG: 20 TABLET, FILM COATED ORAL at 16:27

## 2022-10-22 RX ADMIN — MAGNESIUM HYDROXIDE 30 ML: 400 SUSPENSION ORAL at 18:10

## 2022-10-22 RX ADMIN — SENNOSIDES AND DOCUSATE SODIUM 2 TABLET: 50; 8.6 TABLET ORAL at 05:01

## 2022-10-22 RX ADMIN — FAMOTIDINE 20 MG: 20 TABLET, FILM COATED ORAL at 04:22

## 2022-10-22 RX ADMIN — GUAIFENESIN 600 MG: 600 TABLET, EXTENDED RELEASE ORAL at 18:10

## 2022-10-22 RX ADMIN — ASPIRIN 81 MG: 81 TABLET, COATED ORAL at 05:05

## 2022-10-22 RX ADMIN — DOXYCYCLINE 100 MG: 100 TABLET, FILM COATED ORAL at 18:04

## 2022-10-22 RX ADMIN — DOXYCYCLINE 100 MG: 100 TABLET, FILM COATED ORAL at 05:02

## 2022-10-22 RX ADMIN — LISINOPRIL 40 MG: 20 TABLET ORAL at 05:03

## 2022-10-22 RX ADMIN — GUAIFENESIN 600 MG: 600 TABLET, EXTENDED RELEASE ORAL at 10:45

## 2022-10-22 RX ADMIN — POLYETHYLENE GLYCOL 3350 1 PACKET: 17 POWDER, FOR SOLUTION ORAL at 05:05

## 2022-10-22 RX ADMIN — AMLODIPINE BESYLATE 10 MG: 5 TABLET ORAL at 05:05

## 2022-10-22 RX ADMIN — CARVEDILOL 3.12 MG: 3.12 TABLET, FILM COATED ORAL at 10:45

## 2022-10-22 RX ADMIN — APIXABAN 2.5 MG: 2.5 TABLET, FILM COATED ORAL at 18:04

## 2022-10-22 RX ADMIN — HYDRALAZINE HYDROCHLORIDE 100 MG: 25 TABLET, FILM COATED ORAL at 18:14

## 2022-10-22 RX ADMIN — CARVEDILOL 3.12 MG: 3.12 TABLET, FILM COATED ORAL at 18:14

## 2022-10-22 RX ADMIN — HEPARIN SODIUM 1500 UNITS: 1000 INJECTION, SOLUTION INTRAVENOUS; SUBCUTANEOUS at 11:53

## 2022-10-22 RX ADMIN — AMOXICILLIN AND CLAVULANATE POTASSIUM 1 TABLET: 500; 125 TABLET, FILM COATED ORAL at 16:26

## 2022-10-22 RX ADMIN — LEVOTHYROXINE SODIUM 50 MCG: 50 TABLET ORAL at 05:03

## 2022-10-22 RX ADMIN — SEVELAMER CARBONATE 800 MG: 800 TABLET, FILM COATED ORAL at 10:45

## 2022-10-22 RX ADMIN — FLUTICASONE PROPIONATE 100 MCG: 50 SPRAY, METERED NASAL at 10:46

## 2022-10-22 ASSESSMENT — PAIN DESCRIPTION - PAIN TYPE: TYPE: ACUTE PAIN

## 2022-10-22 ASSESSMENT — FIBROSIS 4 INDEX
FIB4 SCORE: 3.04
FIB4 SCORE: 3.04

## 2022-10-22 ASSESSMENT — ENCOUNTER SYMPTOMS
FEVER: 0
ABDOMINAL PAIN: 0
SHORTNESS OF BREATH: 0

## 2022-10-22 NOTE — DIETARY
Nutrition Services Brief Update:    Day 3 of admit.  Tere Gallegos is a 71 y.o. female with admitting DX of ESRD (end stage renal disease) on dialysis ([facility]) [N18.6, Z99.2]  Acute respiratory failure with hypoxia ([facility]) [J96.01]    Current Diet: Renal    Problem: Nutritional:  Goal: Achieve adequate nutritional intake  Description: Patient will consume >50-75% of meals  Outcome: Progressing    Most recent 2 meals documented at %. Most meals this admit documented >50%, x2 0-<25%.    RD continues to follow.

## 2022-10-22 NOTE — PROGRESS NOTES
Hospital Medicine Daily Progress Note    Date of Service  10/22/2022    Chief Complaint  Shortness of breath    Hospital Course  Tere Gallegos is a 71 y.o. female with end-stage renal disease on hemodialysis Monday Wednesday Friday, hypothyroidism, CAD, hypertension, PAF on anticoagulation, admitted 10/19/2022 with shortness of breath and cough.  Chest x-ray showed pulmonary edema.  WBC 3300, creatinine within her ESRD levels.  She is requiring oxygen supplement as hypoxic on room air.  Nephrology was contacted for hemodialysis.  She was also felt to be in hypertensive urgency, and was placed on as needed IV labetalol along with continuation of her home antihypertensives.  She also had increased oxygen requirement.  She had hemodialysis, with extra sessions for fluid removal.  Repeat chest x-rays showed left pulmonary infiltrates despite volume removal with dialysis, and her procalcitonin was elevated, prompting initiation of antibiotics for possible pneumonia. Repeat chest x-ray showed hazy left pulmonary infiltrates, with trace left pleural effusion, and lucency beneath the right diaphragm, raising concern for intra-abdominal free air, prompting a CT of the abdomen which showed no free intraperitoneal air, with no evidence of bowel obstruction or acute appendicitis, and no free fluid, with bilateral lung base atelectasis/edema and small pleural effusions.     Interval Problem Update  10/22/2022 - I reviewed the patient's chart. There were no significant overnight events. Remains hemodynamically stable and afebrile.  Now down to 2L O2 NC.  WBC 2500.  Hemoglobin 7.9.  Electrolytes remain normal.  Creatinine 3.91.  Procalcitonin elevated at 1.0.  CT of the chest was pursued, which showed no PE but with mild diffuse groundglass opacities in the lungs consistent with edema, along with small bilateral pleural effusions, and 6.2 mm and smaller nodules within the lungs which remained unchanged.  Chest x-ray  this morning showed increased interstitial edema (personally reviewed).  Discussed with nephrology, will continue volume removal with hemodialysis.  She will get dialyzed today.    > I have personally seen and examined the patient today.  She is feeling better today.  Breathing better.  No cough.  She is only complaining of sinus pressure, and postnasal drip.  No nausea, vomiting, chest pain or shortness of breath.    I have discussed this patient's plan of care and discharge plan at IDT rounds today with Case Management, Nursing, Nursing leadership, and other members of the IDT team.        Consultants/Specialty  nephrology    Code Status  Full Code    Disposition  Patient is not medically cleared for discharge.   Anticipate discharge to to home with organized home healthcare and close outpatient follow-up.  PT/OT recommending home health services.  I have placed the appropriate orders for post-discharge needs.    Review of Systems  ROS     Pertinent positives/negatives as mentioned above.     A complete review of systems was personally done by me. All other systems were negative.       Physical Exam  Temp:  [36.3 °C (97.4 °F)-36.9 °C (98.4 °F)] 36.9 °C (98.4 °F)  Pulse:  [] 109  Resp:  [16-21] 18  BP: (105-156)/(36-79) 121/43  SpO2:  [80 %-100 %] 98 %    Physical Exam  Vitals reviewed.   Constitutional:       General: She is not in acute distress.     Appearance: Normal appearance. She is normal weight. She is not ill-appearing or diaphoretic.   HENT:      Head: Normocephalic and atraumatic.      Right Ear: External ear normal.      Left Ear: External ear normal.      Mouth/Throat:      Mouth: Mucous membranes are moist.      Pharynx: No oropharyngeal exudate or posterior oropharyngeal erythema.   Eyes:      General: No scleral icterus.     Extraocular Movements: Extraocular movements intact.      Conjunctiva/sclera: Conjunctivae normal.      Pupils: Pupils are equal, round, and reactive to light.    Cardiovascular:      Rate and Rhythm: Normal rate and regular rhythm.      Heart sounds: Normal heart sounds. No murmur heard.  Pulmonary:      Effort: Pulmonary effort is normal. No respiratory distress.      Breath sounds: No stridor. No wheezing, rhonchi or rales.      Comments: Diminished air entry B/L bases, otherwise clear to auscultation  Chest:      Chest wall: No tenderness.   Abdominal:      General: Bowel sounds are normal. There is no distension.      Palpations: Abdomen is soft. There is no mass.      Tenderness: There is no abdominal tenderness. There is no guarding or rebound.   Musculoskeletal:         General: No swelling. Normal range of motion.      Cervical back: Normal range of motion and neck supple. No rigidity. No muscular tenderness.      Right lower leg: No edema.      Left lower leg: No edema.   Lymphadenopathy:      Cervical: No cervical adenopathy.   Skin:     General: Skin is warm and dry.      Coloration: Skin is not jaundiced.      Findings: No rash.   Neurological:      General: No focal deficit present.      Mental Status: She is alert and oriented to person, place, and time. Mental status is at baseline.      Cranial Nerves: No cranial nerve deficit.   Psychiatric:         Mood and Affect: Mood normal.         Behavior: Behavior normal.         Thought Content: Thought content normal.         Judgment: Judgment normal.     I have performed the physical examination today 10/22/2022.  In review of yesterday's note, there are no new changes except as documented above.      Fluids    Intake/Output Summary (Last 24 hours) at 10/22/2022 1225  Last data filed at 10/22/2022 0839  Gross per 24 hour   Intake 1360 ml   Output 3700 ml   Net -2340 ml       Laboratory  Recent Labs     10/20/22  0243 10/20/22  1231 10/22/22  0049   WBC 3.2* 3.3* 2.5*   RBC 2.49* 2.46* 2.52*   HEMOGLOBIN 7.9* 7.7* 7.9*   HEMATOCRIT 24.6* 24.2* 24.4*   MCV 98.8* 98.4* 96.8   MCH 31.7 31.3 31.3   MCHC 32.1* 31.8*  32.4*   RDW 47.4 47.5 45.7   PLATELETCT 114* 115* 138*   MPV 10.7 10.9 10.8     Recent Labs     10/20/22  0243 10/21/22  0134 10/22/22  0049   SODIUM 138 140 135   POTASSIUM 4.9 4.2 4.5   CHLORIDE 98 100 96   CO2 30 30 28   GLUCOSE 102* 82 74   BUN 17 18 19   CREATININE 5.27* 4.06* 3.91*   CALCIUM 9.6 9.7 9.7                   Imaging  DX-CHEST-PORTABLE (1 VIEW)   Final Result      Increased diffuse interstitial pulmonary edema.      CT-CTA CHEST PULMONARY ARTERY W/ RECONS   Final Result      1.  No pulmonary artery emboli identified. Subsegmental low pulmonary artery branches are obscured by motion and misregistration artifact.      2.  Mild diffuse groundglass opacities with lungs consistent with edema/possible pneumonitis. Small bilateral pleural effusions.      3.  6.2 mm and smaller nodules within the lungs unchanged. A 2.7 mm nodule within lingula not previously seen.      4.  Atherosclerotic changes.      Multiple nodules 6 mm-8 mm:   Fleischner Society pulmonary nodule recommendations:   Low Risk: CT at 3-6 months, then consider CT at 18-24 months      High Risk: CT at 3-6 months, then at 18-24 months      Comments: Use most suspicious nodule as guide to management. Follow-up intervals may vary according to size and risk.      Low Risk - Minimal or absent history of smoking and of other known risk factors.      High Risk - History of smoking or of other known risk factors.      Note: These recommendations do not apply to lung cancer screening, patients with immunosuppression, or patients with known primary cancer.      Fleischner Society 2017 Guidelines for Management of Incidentally Detected Pulmonary Nodules in Adults               CT-ABDOMEN-PELVIS W/O   Final Result      1.  No free intraperitoneal air.      2.  No evidence of bowel obstruction or acute appendicitis. No free fluid.      3.  Surgical absence left kidney. No right hydronephrosis or urolithiasis.      4.  Bilateral lung base  atelectasis/edema and small pleural effusions.      DX-CHEST-PORTABLE (1 VIEW)   Final Result         1.  Hazy left pulmonary infiltrates   2.  Trace left pleural effusion   3.  Cardiomegaly   4.  Lucency beneath the right hemidiaphragm, appearance raising concern for intra-abdominal free air. Further evaluation with CT of the abdomen for further characterization.      These findings were discussed with the patient's clinician, Klaus Campa, on 10/21/2022 7:07 AM.      DX-CHEST-PORTABLE (1 VIEW)   Final Result      Increasing lung volumes with worsening peripheral right basilar opacity from atelectasis or consolidation      Interstitial edema and cardiac enlargement as before      DX-CHEST-PORTABLE (1 VIEW)   Final Result      1.  Cardiomegaly with evidence of mild fluid overload..           Assessment/Plan  * Acute respiratory failure with hypoxia (HCC)- (present on admission)  Assessment & Plan  - Initially felt to be due to volume overload.  Suspect that her outpatient dialysis is not putting enough fluids, may need to change her dry weight target.  Chest imaging showing bilateral lung base atelectasis/edema, and small pleural effusions.  Procalcitonin is elevated, but this is also in setting of her ESRD.  Unable to totally rule out pneumonia.  She is on anticoagulation for her PAF, and she has no PE on CTA.  -Continue hemodialysis per nephrology.  Will need further volume removal today.  -Continue empiric antibiotics for pneumonia.  Transition to oral Augmentin, and continue doxycycline. Trend procalcitonin.  -Continue respiratory support with RT protocol and oxygen supplement.  Continue to wean off oxygen supplement to keep saturations above 90%.    Hypertensive emergency- (present on admission)  Assessment & Plan  - Patient's hypertension has been significantly elevated on admission, may have contributed to pulmonary edema.  -Blood pressure trend better with resumption of her home antihypertensives, and  resumption of hemodialysis.  Continue home Norvasc, hydralazine, Coreg, and lisinopril.  Monitor blood pressure trend closely.  May need further adjustments if blood pressure fluctuates significantly.  Continue as needed IV labetalol for significant hypertension.    Lung nodules  Assessment & Plan  - Outpatient follow-up per Fleischner Society recommendations.    GERD (gastroesophageal reflux disease)- (present on admission)  Assessment & Plan  - Continue home Pepcid    Paroxysmal A-fib (HCC)- (present on admission)  Assessment & Plan  - Maintaining sinus rhythm.  Continue home Coreg.  Continue anticoagulation with Eliquis.      Pancytopenia (HCC)- (present on admission)  Assessment & Plan  - Chronic, no sign of gross bleeding    Acquired hypothyroidism- (present on admission)  Assessment & Plan  - Continue home Synthroid at 50 mcg daily  -Most recent TSH was approximately 2 months ago was normal at 2.6    ESRD (end stage renal disease) on dialysis (HCC)- (present on admission)  Assessment & Plan  - Usual outpatient hemodialysis on Mondays, Wednesday, and Friday.  Per the patient, she has not missed any dialysis days.  -May need to change her dry weight target.  -Nephrology on board.    RLS (restless legs syndrome)- (present on admission)  Assessment & Plan  - Continue home Requip       VTE prophylaxis: therapeutic anticoagulation with Eliquis

## 2022-10-22 NOTE — DISCHARGE PLANNING
Received Choice form at 1006  Agency/Facility Name: [facility]  Referral sent per Choice form @ [phone]

## 2022-10-22 NOTE — DISCHARGE PLANNING
Case Management Discharge Planning    Admission Date: 10/19/2022  GMLOS: 3.9  ALOS: 3    6-Clicks ADL Score: 20  6-Clicks Mobility Score: 19      Anticipated Discharge Dispo: Discharge Disposition: D/T to home under care of home health w/planned hosp IP readmit (86)    DME Needed: No    Action(s) Taken: Updated Provider/Nurse on Discharge Plan    0950: RN CHYNA spoke to patient at bedside and collected choice for [facility]. Choice faxed to [facility].     1236: Per Dahlia with [facility], patient is accepted to their service.     Escalations Completed: None    Medically Clear: No    Next Steps: Medical clearance    Barriers to Discharge: Medical clearance

## 2022-10-22 NOTE — CARE PLAN
Problem: Pain - Standard  Goal: Alleviation of pain or a reduction in pain to the patient’s comfort goal  10/22/2022 1252 by Pamela Mcbride R.N.  Outcome: Progressing  10/22/2022 1250 by Pamela Mcbride R.N.  Outcome: Progressing     Problem: Knowledge Deficit - Standard  Goal: Patient and family/care givers will demonstrate understanding of plan of care, disease process/condition, diagnostic tests and medications  10/22/2022 1252 by Pamela Mcbride R.N.  Outcome: Progressing  10/22/2022 1250 by Pamela Mcbride R.N.  Outcome: Progressing     Problem: Respiratory  Goal: Patient will achieve/maintain optimum respiratory ventilation and gas exchange  Outcome: Progressing   The patient is Stable - Low risk of patient condition declining or worsening    Shift Goals  Clinical Goals: saeed O2. dialysis  Patient Goals: megan  Family Goals: megan    Progress made toward(s) clinical / shift goals:    Problem: Pain - Standard  Goal: Alleviation of pain or a reduction in pain to the patient’s comfort goal  10/22/2022 1252 by Pamela Mcbride R.N.  Outcome: Progressing  10/22/2022 1250 by Pamela Mcbride R.N.  Outcome: Progressing     Problem: Knowledge Deficit - Standard  Goal: Patient and family/care givers will demonstrate understanding of plan of care, disease process/condition, diagnostic tests and medications  10/22/2022 1252 by Pamela Mcbride R.N.  Outcome: Progressing  10/22/2022 1250 by Pamela Mcbride R.N.  Outcome: Progressing     Problem: Respiratory  Goal: Patient will achieve/maintain optimum respiratory ventilation and gas exchange  Outcome: Progressing       Patient is not progressing towards the following goals:

## 2022-10-22 NOTE — PROGRESS NOTES
Bedside report received from Emily ROBLES and assumed Pt's cares. Pt resting with eyes closed. Regular and unlabored breathing. No signs of acute distress noticed. Call light within reach. Safety measures in place.

## 2022-10-22 NOTE — PROGRESS NOTES
Nephrology Daily Progress Note    Date of Service  10/21/2022    Chief Complaint  71 y.o. female with a history of diabetes, hypertension, ESRD on dialysis Monday Wednesday Friday who presented 10/19/2022 with shortness of breath.    Interval Problem Update  10/21 -patient had dialysis 2 days ago with 2 L removed, yesterday with 2.8 L removed.  She denies shortness of breath, but is still requiring oxygen via nonrebreather mask.  She denies chest pain.    Review of Systems  Review of Systems   Constitutional:  Negative for fever.   Respiratory:  Negative for shortness of breath.    Cardiovascular:  Negative for chest pain.   Gastrointestinal:  Negative for abdominal pain.   All other systems reviewed and are negative.     Physical Exam  Temp:  [36.3 °C (97.4 °F)-37.5 °C (99.5 °F)] 36.3 °C (97.4 °F)  Pulse:  [54-73] 54  Resp:  [16-21] 21  BP: (111-156)/(40-51) 156/49  SpO2:  [80 %-100 %] 92 %    Physical Exam  Constitutional:       General: She is not in acute distress.     Appearance: She is well-developed.   HENT:      Head: Normocephalic and atraumatic.      Mouth/Throat:      Mouth: Mucous membranes are moist.      Pharynx: Oropharynx is clear. No oropharyngeal exudate.   Eyes:      General: No scleral icterus.     Extraocular Movements: Extraocular movements intact.   Neck:      Trachea: No tracheal deviation.   Cardiovascular:      Rate and Rhythm: Normal rate and regular rhythm.      Heart sounds: Normal heart sounds. No murmur heard.  Pulmonary:      Effort: Pulmonary effort is normal.      Breath sounds: No stridor. No rales.   Abdominal:      Palpations: Abdomen is soft.      Tenderness: There is no abdominal tenderness.   Musculoskeletal:         General: Normal range of motion.      Cervical back: Normal range of motion. No rigidity.      Right lower leg: No edema.      Left lower leg: No edema.   Skin:     General: Skin is warm and dry.   Neurological:      General: No focal deficit present.      Mental  Status: She is alert and oriented to person, place, and time.   Psychiatric:         Mood and Affect: Mood normal.         Behavior: Behavior normal.   Dialysis access: Left brachiocephalic AV fistula, patent bruit and thrill    Fluids    Intake/Output Summary (Last 24 hours) at 10/21/2022 1850  Last data filed at 10/21/2022 1510  Gross per 24 hour   Intake 1060 ml   Output 3700 ml   Net -2640 ml       Laboratory  Labs reviewed, pertinent labs below.  Recent Labs     10/19/22  0106 10/20/22  0243 10/20/22  1231   WBC 3.3* 3.2* 3.3*   RBC 2.94* 2.49* 2.46*   HEMOGLOBIN 9.3* 7.9* 7.7*   HEMATOCRIT 28.3* 24.6* 24.2*   MCV 96.3 98.8* 98.4*   MCH 31.6 31.7 31.3   MCHC 32.9* 32.1* 31.8*   RDW 46.2 47.4 47.5   PLATELETCT 128* 114* 115*   MPV 10.8 10.7 10.9     Recent Labs     10/19/22  0106 10/20/22  0243 10/21/22  0134   SODIUM 139 138 140   POTASSIUM 3.8 4.9 4.2   CHLORIDE 94* 98 100   CO2 28 30 30   GLUCOSE 125* 102* 82   BUN 28* 17 18   CREATININE 6.93* 5.27* 4.06*   CALCIUM 10.4* 9.6 9.7               URINALYSIS:  No results found for: COLORURINE, CLARITY, SPECGRAVITY, PHURINE, KETONES, PROTEINURIN, BILIRUBINUR, UROBILU, NITRITE, LEUKESTERAS, OCCULTBLOOD  UPC  No results found for: TOTPROTUR No results found for: CREATININEU      Imaging interpreted by radiologist. Imaging reports reviewed with pertinent findings below  CT-CTA CHEST PULMONARY ARTERY W/ RECONS   Final Result      1.  No pulmonary artery emboli identified. Subsegmental low pulmonary artery branches are obscured by motion and misregistration artifact.      2.  Mild diffuse groundglass opacities with lungs consistent with edema/possible pneumonitis. Small bilateral pleural effusions.      3.  6.2 mm and smaller nodules within the lungs unchanged. A 2.7 mm nodule within lingula not previously seen.      4.  Atherosclerotic changes.      Multiple nodules 6 mm-8 mm:   Fleischner Society pulmonary nodule recommendations:   Low Risk: CT at 3-6 months, then  consider CT at 18-24 months      High Risk: CT at 3-6 months, then at 18-24 months      Comments: Use most suspicious nodule as guide to management. Follow-up intervals may vary according to size and risk.      Low Risk - Minimal or absent history of smoking and of other known risk factors.      High Risk - History of smoking or of other known risk factors.      Note: These recommendations do not apply to lung cancer screening, patients with immunosuppression, or patients with known primary cancer.      Fleischner Society 2017 Guidelines for Management of Incidentally Detected Pulmonary Nodules in Adults               CT-ABDOMEN-PELVIS W/O   Final Result      1.  No free intraperitoneal air.      2.  No evidence of bowel obstruction or acute appendicitis. No free fluid.      3.  Surgical absence left kidney. No right hydronephrosis or urolithiasis.      4.  Bilateral lung base atelectasis/edema and small pleural effusions.      DX-CHEST-PORTABLE (1 VIEW)   Final Result         1.  Hazy left pulmonary infiltrates   2.  Trace left pleural effusion   3.  Cardiomegaly   4.  Lucency beneath the right hemidiaphragm, appearance raising concern for intra-abdominal free air. Further evaluation with CT of the abdomen for further characterization.      These findings were discussed with the patient's clinician, Klaus Campa, on 10/21/2022 7:07 AM.      DX-CHEST-PORTABLE (1 VIEW)   Final Result      Increasing lung volumes with worsening peripheral right basilar opacity from atelectasis or consolidation      Interstitial edema and cardiac enlargement as before      DX-CHEST-PORTABLE (1 VIEW)   Final Result      1.  Cardiomegaly with evidence of mild fluid overload..            Current Facility-Administered Medications   Medication Dose Route Frequency Provider Last Rate Last Admin    [START ON 10/22/2022] ampicillin/sulbactam (UNASYN) 3 g in  mL IVPB  3 g Intravenous Q EVENING Klaus Campa M.D.        doxycycline  monohydrate (ADOXA) tablet 100 mg  100 mg Oral Q12HRS Kalus Campa M.D.   100 mg at 10/21/22 1704    epoetin (Retacrit) injection (Dialysis Use Only) 3,000 Units  3,000 Units Intravenous MO, WE + FR Priyank Villar M.D.   3,000 Units at 10/21/22 1245    amLODIPine (NORVASC) tablet 10 mg  10 mg Oral DAILY DANITZA GonzalezO.   10 mg at 10/21/22 0615    apixaban (ELIQUIS) tablet 2.5 mg  2.5 mg Oral BID DANITZA GonzalezO.   2.5 mg at 10/21/22 1704    aspirin EC (ECOTRIN) tablet 81 mg  81 mg Oral DAILY DANITZA GonzalezOOctaviano   81 mg at 10/21/22 0615    carvedilol (COREG) tablet 3.125 mg  3.125 mg Oral BID WITH MEALS DANITZA GonzalezOOctaviano   3.125 mg at 10/21/22 0757    famotidine (PEPCID) tablet 20 mg  20 mg Oral BID DANITZA GonzalezO.   20 mg at 10/21/22 1704    hydrALAZINE (APRESOLINE) tablet 100 mg  100 mg Oral BID DANITZA GonzalezO.   100 mg at 10/21/22 1704    levothyroxine (SYNTHROID) tablet 50 mcg  50 mcg Oral AM ES DANITZA GonzalezO.   50 mcg at 10/21/22 0617    lisinopril (PRINIVIL) tablet 40 mg  40 mg Oral DAILY DANITZA GonzalezO.   40 mg at 10/21/22 0617    ROPINIRole (REQUIP) tablet 1 mg  1 mg Oral BID DANITZA GonzalezO.   1 mg at 10/21/22 1703    senna-docusate (PERICOLACE or SENOKOT S) 8.6-50 MG per tablet 2 Tablet  2 Tablet Oral BID DANITZA GonzalezO.   2 Tablet at 10/21/22 1703    And    polyethylene glycol/lytes (MIRALAX) PACKET 1 Packet  1 Packet Oral QDAY PRN Jesus Alberto Cuba D.O.        And    magnesium hydroxide (MILK OF MAGNESIA) suspension 30 mL  30 mL Oral QDAY PRN Jesus Alberto Cuba D.O.        And    bisacodyl (DULCOLAX) suppository 10 mg  10 mg Rectal QDAY PRN Jesus Alberto Cuba D.O.        acetaminophen (Tylenol) tablet 650 mg  650 mg Oral Q6HRS PRN Jesus Alberto Cuba D.O.        labetalol (NORMODYNE/TRANDATE) injection 10 mg  10 mg Intravenous Q4HRS PRN Jesus Alberto Cuba D.O.        ondansetron (ZOFRAN) syringe/vial injection 4 mg  4 mg Intravenous Q4HRS PRN Jesus Alberto Cuba  D.OOctaviano        ondansetron (ZOFRAN ODT) dispertab 4 mg  4 mg Oral Q4HRS PRN Jesus Alberto Cuba D.O.        heparin injection 1,500 Units  1,500 Units Intravenous DIALYSIS PRN Priyank Villar M.D.   1,500 Units at 10/20/22 1145    sevelamer carbonate (RENVELA) tablet 800 mg  800 mg Oral TID WITH MEALS Klaus Campa M.D.   800 mg at 10/21/22 1703         Assessment/Plan  71 y.o. female with a history of diabetes, hypertension, ESRD on dialysis Monday Wednesday Friday who presented 10/19/2022 with shortness of breath.     1.  ESRD on dialysis Monday Wednesday Friday.  Anuric.  Dialysis today per usual schedule.  Likely plan on dialysis again tomorrow given persistent hypoxia and mild volume overload on chest CT scan.  Check labs daily.    2.  Dialysis access: Left arm AV fistula, patent.  Continue left arm precautions.    3.  Acute hypoxic respiratory failure, persistent.  CT chest without pulmonary embolism, shows mild persistent volume overload.  Continue daily dialysis with ultrafiltration for now.  Defer further management to primary team.    4.  Anemia of chronic disease.  Persistent.  Continue Epogen thrice weekly with dialysis.  Check CBC at least 3 times a week.    5.  Leukopenia, chronic, mild, persistent.  Patient had a history of bone marrow biopsy with cancer care specialist that showed no significant abnormalities.  Check CBC daily.    6.  Thrombocytopenia, chronic, mild, and persistent.  Patient had a normal bone marrow biopsy in the past.  Likely due to mild splenomegaly.  Check CBC daily.     Discussed with Dr. Katheryn Villar MD  Nephrology  [facility]

## 2022-10-22 NOTE — PROGRESS NOTES
Nephrology Daily Progress Note    Date of Service  10/22/2022    Chief Complaint  71 y.o. female with a history of diabetes, hypertension, ESRD on dialysis Monday Wednesday Friday who presented 10/19/2022 with shortness of breath.    Interval Problem Update  10/21 -patient had dialysis 2 days ago with 2 L removed, yesterday with 2.8 L removed.  She denies shortness of breath, but is still requiring oxygen via nonrebreather mask.  She denies chest pain.  10/22 -patient had dialysis yesterday with 3 L removed, her weight down to 59 kg.  Today, denies chest pain, shortness of breath, but willing to try dialysis again to get more fluid out of her lungs.  Still on a small amount of oxygen by nasal cannula.    Review of Systems  Review of Systems   Constitutional:  Negative for fever.   Respiratory:  Negative for shortness of breath.    Cardiovascular:  Negative for chest pain.   Gastrointestinal:  Negative for abdominal pain.   All other systems reviewed and are negative.     Physical Exam  Temp:  [36.4 °C (97.5 °F)-36.9 °C (98.4 °F)] 36.4 °C (97.6 °F)  Pulse:  [] 63  Resp:  [15-21] 16  BP: ()/(36-79) 104/44  SpO2:  [80 %-99 %] 95 %    Physical Exam  Constitutional:       General: She is not in acute distress.     Appearance: She is well-developed.   HENT:      Head: Normocephalic and atraumatic.      Mouth/Throat:      Mouth: Mucous membranes are moist.      Pharynx: Oropharynx is clear. No oropharyngeal exudate.   Eyes:      General: No scleral icterus.     Extraocular Movements: Extraocular movements intact.   Neck:      Trachea: No tracheal deviation.   Cardiovascular:      Rate and Rhythm: Normal rate and regular rhythm.      Heart sounds: Normal heart sounds. No murmur heard.  Pulmonary:      Effort: Pulmonary effort is normal.      Breath sounds: No stridor. No rales.   Abdominal:      Palpations: Abdomen is soft.      Tenderness: There is no abdominal tenderness.   Musculoskeletal:         General:  Normal range of motion.      Cervical back: Normal range of motion. No rigidity.      Right lower leg: No edema.      Left lower leg: No edema.   Skin:     General: Skin is warm and dry.   Neurological:      General: No focal deficit present.      Mental Status: She is alert and oriented to person, place, and time.   Psychiatric:         Mood and Affect: Mood normal.         Behavior: Behavior normal.   Dialysis access: Left brachiocephalic AV fistula, patent bruit and thrill    Fluids    Intake/Output Summary (Last 24 hours) at 10/22/2022 1517  Last data filed at 10/22/2022 0839  Gross per 24 hour   Intake 660 ml   Output --   Net 660 ml         Laboratory  Labs reviewed, pertinent labs below.  Recent Labs     10/20/22  0243 10/20/22  1231 10/22/22  0049   WBC 3.2* 3.3* 2.5*   RBC 2.49* 2.46* 2.52*   HEMOGLOBIN 7.9* 7.7* 7.9*   HEMATOCRIT 24.6* 24.2* 24.4*   MCV 98.8* 98.4* 96.8   MCH 31.7 31.3 31.3   MCHC 32.1* 31.8* 32.4*   RDW 47.4 47.5 45.7   PLATELETCT 114* 115* 138*   MPV 10.7 10.9 10.8       Recent Labs     10/20/22  0243 10/21/22  0134 10/22/22  0049   SODIUM 138 140 135   POTASSIUM 4.9 4.2 4.5   CHLORIDE 98 100 96   CO2 30 30 28   GLUCOSE 102* 82 74   BUN 17 18 19   CREATININE 5.27* 4.06* 3.91*   CALCIUM 9.6 9.7 9.7                 URINALYSIS:  No results found for: COLORURINE, CLARITY, SPECGRAVITY, PHURINE, KETONES, PROTEINURIN, BILIRUBINUR, UROBILU, NITRITE, LEUKESTERAS, OCCULTBLOOD  UPC  No results found for: TOTPROTUR No results found for: CREATININEU      Imaging interpreted by radiologist. Imaging reports reviewed with pertinent findings below  DX-CHEST-PORTABLE (1 VIEW)   Final Result      Increased diffuse interstitial pulmonary edema.      CT-CTA CHEST PULMONARY ARTERY W/ RECONS   Final Result      1.  No pulmonary artery emboli identified. Subsegmental low pulmonary artery branches are obscured by motion and misregistration artifact.      2.  Mild diffuse groundglass opacities with lungs  consistent with edema/possible pneumonitis. Small bilateral pleural effusions.      3.  6.2 mm and smaller nodules within the lungs unchanged. A 2.7 mm nodule within lingula not previously seen.      4.  Atherosclerotic changes.      Multiple nodules 6 mm-8 mm:   Fleischner Society pulmonary nodule recommendations:   Low Risk: CT at 3-6 months, then consider CT at 18-24 months      High Risk: CT at 3-6 months, then at 18-24 months      Comments: Use most suspicious nodule as guide to management. Follow-up intervals may vary according to size and risk.      Low Risk - Minimal or absent history of smoking and of other known risk factors.      High Risk - History of smoking or of other known risk factors.      Note: These recommendations do not apply to lung cancer screening, patients with immunosuppression, or patients with known primary cancer.      Fleischner Society 2017 Guidelines for Management of Incidentally Detected Pulmonary Nodules in Adults               CT-ABDOMEN-PELVIS W/O   Final Result      1.  No free intraperitoneal air.      2.  No evidence of bowel obstruction or acute appendicitis. No free fluid.      3.  Surgical absence left kidney. No right hydronephrosis or urolithiasis.      4.  Bilateral lung base atelectasis/edema and small pleural effusions.      DX-CHEST-PORTABLE (1 VIEW)   Final Result         1.  Hazy left pulmonary infiltrates   2.  Trace left pleural effusion   3.  Cardiomegaly   4.  Lucency beneath the right hemidiaphragm, appearance raising concern for intra-abdominal free air. Further evaluation with CT of the abdomen for further characterization.      These findings were discussed with the patient's clinician, Klaus Campa, on 10/21/2022 7:07 AM.      DX-CHEST-PORTABLE (1 VIEW)   Final Result      Increasing lung volumes with worsening peripheral right basilar opacity from atelectasis or consolidation      Interstitial edema and cardiac enlargement as before       DX-CHEST-PORTABLE (1 VIEW)   Final Result      1.  Cardiomegaly with evidence of mild fluid overload..            Current Facility-Administered Medications   Medication Dose Route Frequency Provider Last Rate Last Admin    guaiFENesin ER (MUCINEX) tablet 600 mg  600 mg Oral Q12HRS Klaus Campa M.D.   600 mg at 10/22/22 1045    fluticasone (FLONASE) nasal spray 100 mcg  2 [place] Nasal DAILY Klaus Campa M.D.   100 mcg at 10/22/22 1046    amoxicillin-clavulanate (AUGMENTIN) 500-125 MG per tablet 1 Tablet  1 Tablet Oral DAILY Klaus Campa M.D.        doxycycline monohydrate (ADOXA) tablet 100 mg  100 mg Oral Q12HRS Klaus Campa M.D.   100 mg at 10/22/22 0502    epoetin (Retacrit) injection (Dialysis Use Only) 3,000 Units  3,000 Units Intravenous MO, WE + FR Priyank Villar M.D.   3,000 Units at 10/21/22 1245    amLODIPine (NORVASC) tablet 10 mg  10 mg Oral DAILY MARELY Gonzalez.O.   10 mg at 10/22/22 0505    apixaban (ELIQUIS) tablet 2.5 mg  2.5 mg Oral BID MARELY Gonzalez.O.   2.5 mg at 10/22/22 0503    aspirin EC (ECOTRIN) tablet 81 mg  81 mg Oral DAILY MARELY Gonzalez.O.   81 mg at 10/22/22 0505    carvedilol (COREG) tablet 3.125 mg  3.125 mg Oral BID WITH MEALS MARELY Gonzalez.O.   3.125 mg at 10/22/22 1045    famotidine (PEPCID) tablet 20 mg  20 mg Oral BID MARELY Gonzalez.O.   20 mg at 10/22/22 0422    hydrALAZINE (APRESOLINE) tablet 100 mg  100 mg Oral BID MARELY Gonzalez.O.   100 mg at 10/22/22 0502    levothyroxine (SYNTHROID) tablet 50 mcg  50 mcg Oral AM ES MARELY Gonzalez.O.   50 mcg at 10/22/22 0503    lisinopril (PRINIVIL) tablet 40 mg  40 mg Oral DAILY MARELY Gonzalez.O.   40 mg at 10/22/22 0503    ROPINIRole (REQUIP) tablet 1 mg  1 mg Oral BID DANITZA GonzalezOOctaviano   1 mg at 10/22/22 0502    senna-docusate (PERICOLACE or SENOKOT S) 8.6-50 MG per tablet 2 Tablet  2 Tablet Oral BID DANITZA GonzalezO.   2 Tablet at 10/22/22 0501    And    polyethylene glycol/lytes  (MIRALAX) PACKET 1 Packet  1 Packet Oral QDAY PRN DANITZA GonzalezO.   1 Packet at 10/22/22 0505    And    magnesium hydroxide (MILK OF MAGNESIA) suspension 30 mL  30 mL Oral QDAY PRN Jesus Alberto Cuba D.O.        And    bisacodyl (DULCOLAX) suppository 10 mg  10 mg Rectal QDAY PRN DANITZA GonzalezO.        acetaminophen (Tylenol) tablet 650 mg  650 mg Oral Q6HRS PRN Jesus Alberto Cuba D.O.        labetalol (NORMODYNE/TRANDATE) injection 10 mg  10 mg Intravenous Q4HRS PRN Jesus Alberto Cuba D.O.        ondansetron (ZOFRAN) syringe/vial injection 4 mg  4 mg Intravenous Q4HRS PRN Jesus Alberto Cuba D.O.        ondansetron (ZOFRAN ODT) dispertab 4 mg  4 mg Oral Q4HRS PRN Jesus Alberto Cuba D.O.        heparin injection 1,500 Units  1,500 Units Intravenous DIALYSIS PRN Priyank Villar M.D.   1,500 Units at 10/22/22 1153    sevelamer carbonate (RENVELA) tablet 800 mg  800 mg Oral TID WITH MEALS Klaus Campa M.D.   800 mg at 10/22/22 1045         Assessment/Plan  71 y.o. female with a history of diabetes, hypertension, ESRD on dialysis Monday Wednesday Friday who presented 10/19/2022 with shortness of breath.     1.  ESRD on dialysis Monday Wednesday Friday.  Anuric.  1 more dialysis today to help remove extra fluid.  Postdialysis, patient was down to 56.2 kg, but experienced hypotension and muscle cramping during dialysis.  I will change her outpatient target weight to 56.5 kg.  Her target weight had previously been set at 63.5 kg.  Check labs daily.      2.  Dialysis access: Left arm AV fistula, patent.  Continue left arm precautions.    3.  Acute hypoxic respiratory failure, persistent but improving with daily dialysis.  We will plan on dialysis again today.  Attempt to wean off of oxygen, as patient appears to have reached her new target weight.    4.  Anemia of chronic disease.  Persistent.  Continue Epogen thrice weekly with dialysis.  Check CBC at least 3 times a week.    5.  Pancytopenia, mild, chronic and persistent.   Patient had a bone marrow biopsy in the past with cancer care specialist that showed no significant abnormalities.  Check CBC daily.    OK to discharge from Nephrology standpoint.   There is no need for outpatient nephrology clinic follow up appointment, as the patient will be seen by a nephrologist at their outpatient dialysis center.    Discussed with Dr. Katheryn Villar MD  Nephrology  [facility]

## 2022-10-22 NOTE — PROGRESS NOTES
[facility] Progress Note    Hemodialysis treatment ordered today per Dr. Villar x 3 hours.   Treatment initiated at 1221.     1345: BP 97/49-UF goal lowered.   1400: BP 80/42-UF off, 100 mL NS bolus IV. C/o dizziness. HOB lowered.   1405: /43. Dizziness subsiding.   1430: /50. Cramping to BLE. Fluid removal off, 100 mL NS bolus IV. Comfort measures provided.     Fluid removal limited by BP/cramping. See electronic flow sheet for details.     Net UF 1,300 mL.     Needles removed from access site. Dressings applied and sites held x 10 minutes; verified no bleeding. Positive bruit/thrill post tx.   Staff RN to monitor AVF/G for breakthrough bleeding. Should breakthrough bleeding occur, staff RN to apply pressure to access sites until bleeding resolved.   Notify Dialysis and Nephrologist for follow-up.    Report given to Primary RN.

## 2022-10-22 NOTE — CARE PLAN
The patient is Stable - Low risk of patient condition declining or worsening    Shift Goals  Clinical Goals: Pt safety. Monitor respiratory status. Titrate O2  Patient Goals: Sleep  Family Goals: No family present    Progress made toward(s) clinical / shift goals:  Continuously assessing her respiratory status. Encouraged her deep breathing and coughing. Encouraged and assisted her with use of Incentive Spirometer. Continuous pulse oximeter. Pt able to keep sats >90% on 9 to 13 L O2 via oxy mask    Patient is not progressing towards the following goals:      Problem: Respiratory  Goal: Patient will achieve/maintain optimum respiratory ventilation and gas exchange  Outcome: Not Progressing  Note: Pt denies any SOB but still requires 9 to 13 L of O2 via oxy mask. Breath sounds diminished throughout.      Problem: Knowledge Deficit - Standard  Goal: Patient and family/care givers will demonstrate understanding of plan of care, disease process/condition, diagnostic tests and medications  Outcome: Progressing     Problem: Pain - Standard  Goal: Alleviation of pain or a reduction in pain to the patient’s comfort goal  Outcome: Progressing     Problem: Fall Risk  Goal: Patient will remain free from falls  Outcome: Progressing

## 2022-10-22 NOTE — CARE PLAN
The patient is Stable - Low risk of patient condition declining or worsening    Shift Goals  Clinical Goals: saeed O2. dialysis  Patient Goals: megan  Family Goals: megan    Progress made toward(s) clinical / shift goals:    Problem: Pain - Standard  Goal: Alleviation of pain or a reduction in pain to the patient’s comfort goal  Outcome: Progressing     Problem: Knowledge Deficit - Standard  Goal: Patient and family/care givers will demonstrate understanding of plan of care, disease process/condition, diagnostic tests and medications  Outcome: Progressing       Patient is not progressing towards the following goals:

## 2022-10-23 ENCOUNTER — APPOINTMENT (OUTPATIENT)
Dept: RADIOLOGY | Facility: MEDICAL CENTER | Age: 72
DRG: 291 | End: 2022-10-23
Attending: INTERNAL MEDICINE
Payer: MEDICARE

## 2022-10-23 VITALS
OXYGEN SATURATION: 95 % | TEMPERATURE: 98.5 F | BODY MASS INDEX: 22.62 KG/M2 | HEART RATE: 76 BPM | HEIGHT: 63 IN | SYSTOLIC BLOOD PRESSURE: 125 MMHG | RESPIRATION RATE: 18 BRPM | WEIGHT: 127.65 LBS | DIASTOLIC BLOOD PRESSURE: 48 MMHG

## 2022-10-23 PROBLEM — I16.1 HYPERTENSIVE EMERGENCY: Status: RESOLVED | Noted: 2022-10-19 | Resolved: 2022-10-23

## 2022-10-23 PROBLEM — J96.01 ACUTE RESPIRATORY FAILURE WITH HYPOXIA (HCC): Status: RESOLVED | Noted: 2022-10-19 | Resolved: 2022-10-23

## 2022-10-23 LAB
ANION GAP SERPL CALC-SCNC: 13 MMOL/L (ref 7–16)
BUN SERPL-MCNC: 17 MG/DL (ref 8–22)
CALCIUM SERPL-MCNC: 10.1 MG/DL (ref 8.4–10.2)
CHLORIDE SERPL-SCNC: 97 MMOL/L (ref 96–112)
CO2 SERPL-SCNC: 28 MMOL/L (ref 20–33)
CREAT SERPL-MCNC: 3.98 MG/DL (ref 0.5–1.4)
ERYTHROCYTE [DISTWIDTH] IN BLOOD BY AUTOMATED COUNT: 46 FL (ref 35.9–50)
GFR SERPLBLD CREATININE-BSD FMLA CKD-EPI: 11 ML/MIN/1.73 M 2
GLUCOSE SERPL-MCNC: 103 MG/DL (ref 65–99)
HCT VFR BLD AUTO: 29.4 % (ref 37–47)
HGB BLD-MCNC: 9.4 G/DL (ref 12–16)
MCH RBC QN AUTO: 31.1 PG (ref 27–33)
MCHC RBC AUTO-ENTMCNC: 32 G/DL (ref 33.6–35)
MCV RBC AUTO: 97.4 FL (ref 81.4–97.8)
PLATELET # BLD AUTO: 164 K/UL (ref 164–446)
PMV BLD AUTO: 10.9 FL (ref 9–12.9)
POTASSIUM SERPL-SCNC: 4.4 MMOL/L (ref 3.6–5.5)
PROCALCITONIN SERPL-MCNC: 0.82 NG/ML
RBC # BLD AUTO: 3.02 M/UL (ref 4.2–5.4)
SODIUM SERPL-SCNC: 138 MMOL/L (ref 135–145)
WBC # BLD AUTO: 3 K/UL (ref 4.8–10.8)

## 2022-10-23 PROCEDURE — 80048 BASIC METABOLIC PNL TOTAL CA: CPT

## 2022-10-23 PROCEDURE — 700102 HCHG RX REV CODE 250 W/ 637 OVERRIDE(OP): Performed by: INTERNAL MEDICINE

## 2022-10-23 PROCEDURE — 84145 PROCALCITONIN (PCT): CPT

## 2022-10-23 PROCEDURE — 36415 COLL VENOUS BLD VENIPUNCTURE: CPT

## 2022-10-23 PROCEDURE — 85027 COMPLETE CBC AUTOMATED: CPT

## 2022-10-23 PROCEDURE — 99232 SBSQ HOSP IP/OBS MODERATE 35: CPT | Performed by: INTERNAL MEDICINE

## 2022-10-23 PROCEDURE — A9270 NON-COVERED ITEM OR SERVICE: HCPCS | Performed by: INTERNAL MEDICINE

## 2022-10-23 PROCEDURE — 99239 HOSP IP/OBS DSCHRG MGMT >30: CPT | Performed by: INTERNAL MEDICINE

## 2022-10-23 PROCEDURE — 71045 X-RAY EXAM CHEST 1 VIEW: CPT

## 2022-10-23 RX ORDER — AMOXICILLIN AND CLAVULANATE POTASSIUM 500; 125 MG/1; MG/1
1 TABLET, FILM COATED ORAL DAILY
Qty: 2 TABLET | Refills: 0 | Status: SHIPPED | OUTPATIENT
Start: 2022-10-24 | End: 2022-10-26

## 2022-10-23 RX ORDER — DOXYCYCLINE 100 MG/1
100 TABLET ORAL EVERY 12 HOURS
Qty: 4 TABLET | Refills: 0 | Status: SHIPPED | OUTPATIENT
Start: 2022-10-23 | End: 2022-10-25

## 2022-10-23 RX ORDER — SEVELAMER CARBONATE 800 MG/1
800 TABLET, FILM COATED ORAL
Qty: 270 TABLET | Refills: 0 | Status: SHIPPED | OUTPATIENT
Start: 2022-10-23 | End: 2022-11-27

## 2022-10-23 RX ADMIN — ASPIRIN 81 MG: 81 TABLET, COATED ORAL at 06:07

## 2022-10-23 RX ADMIN — DOXYCYCLINE 100 MG: 100 TABLET, FILM COATED ORAL at 06:07

## 2022-10-23 RX ADMIN — GUAIFENESIN 600 MG: 600 TABLET, EXTENDED RELEASE ORAL at 06:07

## 2022-10-23 RX ADMIN — LEVOTHYROXINE SODIUM 50 MCG: 50 TABLET ORAL at 06:07

## 2022-10-23 RX ADMIN — AMOXICILLIN AND CLAVULANATE POTASSIUM 1 TABLET: 500; 125 TABLET, FILM COATED ORAL at 06:07

## 2022-10-23 RX ADMIN — ROPINIROLE HYDROCHLORIDE 1 MG: 0.5 TABLET, FILM COATED ORAL at 06:07

## 2022-10-23 RX ADMIN — APIXABAN 2.5 MG: 2.5 TABLET, FILM COATED ORAL at 06:07

## 2022-10-23 RX ADMIN — FAMOTIDINE 20 MG: 20 TABLET, FILM COATED ORAL at 03:45

## 2022-10-23 RX ADMIN — SENNOSIDES AND DOCUSATE SODIUM 2 TABLET: 50; 8.6 TABLET ORAL at 06:07

## 2022-10-23 RX ADMIN — CARVEDILOL 3.12 MG: 3.12 TABLET, FILM COATED ORAL at 08:10

## 2022-10-23 RX ADMIN — FLUTICASONE PROPIONATE 100 MCG: 50 SPRAY, METERED NASAL at 06:17

## 2022-10-23 RX ADMIN — SEVELAMER CARBONATE 800 MG: 800 TABLET, FILM COATED ORAL at 08:10

## 2022-10-23 RX ADMIN — AMLODIPINE BESYLATE 10 MG: 5 TABLET ORAL at 06:07

## 2022-10-23 RX ADMIN — HYDRALAZINE HYDROCHLORIDE 100 MG: 25 TABLET, FILM COATED ORAL at 06:07

## 2022-10-23 RX ADMIN — LISINOPRIL 40 MG: 20 TABLET ORAL at 06:07

## 2022-10-23 ASSESSMENT — ENCOUNTER SYMPTOMS
ABDOMINAL PAIN: 0
FEVER: 0
SHORTNESS OF BREATH: 0

## 2022-10-23 ASSESSMENT — FIBROSIS 4 INDEX: FIB4 SCORE: 3.04

## 2022-10-23 NOTE — CARE PLAN
The patient is Stable - Low risk of patient condition declining or worsening    Shift Goals  Clinical Goals: Wean O2, maintain SpO2 sats >90%  Patient Goals: megan  Family Goals: megan    Progress made toward(s) clinical / shift goals:    Problem: Knowledge Deficit - Standard  Goal: Patient and family/care givers will demonstrate understanding of plan of care, disease process/condition, diagnostic tests and medications  Outcome: Progressing  Note:  used to discuss POC with patient, no needs at this time.      Problem: Respiratory  Goal: Patient will achieve/maintain optimum respiratory ventilation and gas exchange  Outcome: Progressing       Patient is not progressing towards the following goals:

## 2022-10-23 NOTE — DISCHARGE SUMMARY
Discharge Summary    CHIEF COMPLAINT ON ADMISSION  Chief Complaint   Patient presents with    Abdominal Pain     For about a week now     Fever     Thinks she has been having fevers     N/V     For the last weeks         Reason for Admission  Abdomen Pain, N/V     Admission Date  10/19/2022    CODE STATUS  Full Code    HPI & HOSPITAL COURSE  Tere Gallegos is a 71 y.o. female with end-stage renal disease on hemodialysis Monday Wednesday Friday, hypothyroidism, CAD, hypertension, PAF on anticoagulation, admitted 10/19/2022 with shortness of breath and cough.  Chest x-ray showed pulmonary edema.  WBC 3300, creatinine within her ESRD levels.  She is requiring oxygen supplement as hypoxic on room air.  Nephrology was contacted for hemodialysis.  She was also felt to be in hypertensive urgency, and was placed on as needed IV labetalol along with continuation of her home antihypertensives.  She also had increased oxygen requirement.  She had hemodialysis, with extra sessions for fluid removal.  Repeat chest x-rays showed left pulmonary infiltrates despite volume removal with dialysis, and her procalcitonin was elevated, prompting initiation of antibiotics for possible pneumonia. Repeat chest x-ray showed hazy left pulmonary infiltrates, with trace left pleural effusion, and lucency beneath the right diaphragm, raising concern for intra-abdominal free air, prompting a CT of the abdomen which showed no free intraperitoneal air, with no evidence of bowel obstruction or acute appendicitis, and no free fluid, with bilateral lung base atelectasis/edema and small pleural effusions. CT of the chest was pursued, which showed no PE but with mild diffuse groundglass opacities in the lungs consistent with edema, along with small bilateral pleural effusions, and 6.2 mm and smaller nodules within the lungs which remained unchanged.  Nephrology continued on volume removal with hemodialysis.    She clinically improved.  She  was able to be weaned off oxygen.  Chest x-ray showed improvement in pulmonary opacities/edema.  She remained hemodynamically stable and afebrile.     I have personally seen and examined the patient on the day of discharge. With her clinical improvement, she was deemed ready to discharge from the hospital as she did not have any further hospitalization needs. Patient felt comfortable going home. The discharge plan was discussed with the patient, with which she was agreeable to.     Therefore, she is discharged in good and stable condition to home with organized home healthcare and close outpatient follow-up.    The patient met 2-midnight criteria for an inpatient stay at the time of discharge.    Discharge Date  10/23/2022      FOLLOW UP ITEMS POST DISCHARGE  -She will complete 2 more days of antibiotics.  -She will continue to keep her hemodialysis schedule.  Her dry weight target can be adjusted by nephrology.  -Follow-up with PCP.  - counseled to seek immediate medical attention, or return to the ED for recurrent or worsening symptoms.      DISCHARGE DIAGNOSES  Principal Problem (Resolved):    Acute respiratory failure with hypoxia due to volume overload, possible pneumonia (HCC) POA: Yes  Active Problems:    Lung nodules POA: No    RLS (restless legs syndrome) POA: Yes    ESRD (end stage renal disease) on dialysis (HCC) POA: Yes      Overview: -Dialysis MWF      -follows with nephrology    Acquired hypothyroidism POA: Yes    Pancytopenia (HCC) POA: Yes    Paroxysmal A-fib (HCC) POA: Yes    GERD (gastroesophageal reflux disease) POA: Yes  Resolved Problems:    Hypertensive emergency POA: Yes      FOLLOW UP  Future Appointments   Date Time Provider Department Center   10/27/2022  3:20 PM ANGELITA Concepcion   11/9/2022  3:20 PM ANGELITA Guzmán   12/14/2022  4:15 PM Milton Pettit M.D. [facility] None   2/6/2023  4:45 PM [facility] EXAM 4 [facility] None     [facility]  [address] , [facility] [address]  [facility] [id number]  [phone]          MEDICATIONS ON DISCHARGE     Medication List        START taking these medications        Instructions   amoxicillin-clavulanate 500-125 MG Tabs  Start taking on: October 24, 2022  Commonly known as: AUGMENTIN   Take 1 Tablet by mouth every day for 2 days.  Dose: 1 Tablet     doxycycline monohydrate 100 MG tablet  Commonly known as: ADOXA   Take 1 Tablet by mouth every 12 hours for 2 days.  Dose: 100 mg     sevelamer carbonate 800 MG Tabs tablet  Commonly known as: RENVELA   Take 1 Tablet by mouth 3 times a day with meals.  Dose: 800 mg            CHANGE how you take these medications        Instructions   levothyroxine 50 MCG Tabs  What changed:   how much to take  how to take this  when to take this  additional instructions  Commonly known as: SYNTHROID   TOME FERNANDO TABLETA POR VIA ORAL CADA MANANA ON AN EMPTY STOMACH     lisinopril 40 MG tablet  What changed: when to take this  Commonly known as: PRINIVIL   TOME FERNANDO TABLETA TODOS LOS MCCORMICK     pioglitazone 30 MG Tabs  What changed:   when to take this  additional instructions  Commonly known as: ACTOS   TOME FERNANDO TABLETA TODOS LOS MCCORMICK            CONTINUE taking these medications        Instructions   Alcohol Swabs   Doctor's comments: Per formulary preference. ICD-10 code: E11.9 Controlled type 2 Diabetes Mellitus  Wipe site with prep pad prior to injection.     apixaban 2.5mg Tabs  Commonly known as: ELIQUIS   Take 1 Tablet by mouth 2 times a day. Indications: Thromboembolism secondary to Atrial Fibrillation  Dose: 2.5 mg     aspirin EC 81 MG Tbec  Commonly known as: ECOTRIN   Take 81 mg by mouth every day.  Dose: 81 mg     * Blood Glucose Meter Kit   Doctor's comments: Or per formulary preference. ICD-10 code: E11.9 Controlled type 2 Diabetes Mellitus  Test blood sugar as recommended by provider. Freestyle Pearl blood glucose monitoring kit.     * Blood Glucose Test Strips   Doctor's comments:  Or per formulary preference. ICD-10 code: E11.9 Controlled type 2 Diabetes Mellitus  Use one Freestyle Pearl strip to test blood sugar once daily .     Lancets   Doctor's comments: Or per formulary preference. ICD-10 code: E11.9 Controlled type 2 Diabetes Mellitus  Use one Freestyle Pearl lancet to test blood sugar once daily .           * This list has 2 medication(s) that are the same as other medications prescribed for you. Read the directions carefully, and ask your doctor or other care provider to review them with you.                ASK your doctor about these medications        Instructions   amLODIPine 10 MG Tabs  Commonly known as: NORVASC   Take 1 Tablet by mouth every day.  Dose: 10 mg     hydrALAZINE 100 MG tablet  Commonly known as: APRESOLINE   Take 1 Tablet by mouth 2 times a day.  Dose: 100 mg     ROPINIRole 1 MG Tabs  Commonly known as: REQUIP   Take 1 Tablet by mouth 2 times a day.  Dose: 1 mg              Allergies  No Known Allergies    DIET  Orders Placed This Encounter   Procedures    Diet Order Diet: Renal     Standing Status:   Standing     Number of Occurrences:   1     Order Specific Question:   Diet:     Answer:   Renal [8]       ACTIVITY  As tolerated.  Weight bearing as tolerated    CONSULTATIONS  Nephrology    PROCEDURES  As above    LABORATORY  Lab Results   Component Value Date    SODIUM 138 10/23/2022    POTASSIUM 4.4 10/23/2022    CHLORIDE 97 10/23/2022    CO2 28 10/23/2022    GLUCOSE 103 (H) 10/23/2022    BUN 17 10/23/2022    CREATININE 3.98 (H) 10/23/2022        Lab Results   Component Value Date    WBC 3.0 (L) 10/23/2022    HEMOGLOBIN 9.4 (L) 10/23/2022    HEMATOCRIT 29.4 (L) 10/23/2022    PLATELETCT 164 10/23/2022        Total time of the discharge process = 40 minutes.

## 2022-10-23 NOTE — PROGRESS NOTES
Report received from night shift nurse. Patient resting comfortably in bed, A&Ox4, states pain 0/10.

## 2022-10-23 NOTE — PROGRESS NOTES
Nephrology Daily Progress Note    Date of Service  10/23/2022    Chief Complaint  71 y.o. female with a history of diabetes, hypertension, ESRD on dialysis Monday Wednesday Friday who presented 10/19/2022 with shortness of breath.    Interval Problem Update  10/21 -patient had dialysis 2 days ago with 2 L removed, yesterday with 2.8 L removed.  She denies shortness of breath, but is still requiring oxygen via nonrebreather mask.  She denies chest pain.  10/22 -patient had dialysis yesterday with 3 L removed, her weight down to 59 kg.  Today, denies chest pain, shortness of breath, but willing to try dialysis again to get more fluid out of her lungs.  Still on a small amount of oxygen by nasal cannula.  10/23-patient had dialysis yesterday with 1.3 L removed, ultrafiltration limited by hypotension and cramps.  Patient weaned off of oxygen, denies chest pain, shortness of breath, hoping to go home.    Review of Systems  Review of Systems   Constitutional:  Negative for fever.   Respiratory:  Negative for shortness of breath.    Cardiovascular:  Negative for chest pain.   Gastrointestinal:  Negative for abdominal pain.   All other systems reviewed and are negative.     Physical Exam  Temp:  [36.4 °C (97.6 °F)-36.9 °C (98.5 °F)] 36.9 °C (98.5 °F)  Pulse:  [60-76] 76  Resp:  [18] 18  BP: (119-125)/(35-48) 125/48  SpO2:  [84 %-97 %] 95 %    Physical Exam  Constitutional:       General: She is not in acute distress.     Appearance: She is well-developed.   HENT:      Head: Normocephalic and atraumatic.      Mouth/Throat:      Mouth: Mucous membranes are moist.      Pharynx: Oropharynx is clear. No oropharyngeal exudate.   Eyes:      General: No scleral icterus.     Extraocular Movements: Extraocular movements intact.   Neck:      Trachea: No tracheal deviation.   Cardiovascular:      Rate and Rhythm: Normal rate and regular rhythm.      Heart sounds: Normal heart sounds. No murmur heard.  Pulmonary:      Effort: Pulmonary  effort is normal.      Breath sounds: No stridor. No rales.   Abdominal:      Palpations: Abdomen is soft.      Tenderness: There is no abdominal tenderness.   Musculoskeletal:         General: Normal range of motion.      Cervical back: Normal range of motion. No rigidity.      Right lower leg: No edema.      Left lower leg: No edema.   Skin:     General: Skin is warm and dry.   Neurological:      General: No focal deficit present.      Mental Status: She is alert and oriented to person, place, and time.   Psychiatric:         Mood and Affect: Mood normal.         Behavior: Behavior normal.   Dialysis access: Left brachiocephalic AV fistula, patent bruit and thrill    Fluids    Intake/Output Summary (Last 24 hours) at 10/23/2022 1612  Last data filed at 10/23/2022 0831  Gross per 24 hour   Intake 240 ml   Output --   Net 240 ml         Laboratory  Labs reviewed, pertinent labs below.  Recent Labs     10/22/22  0049 10/23/22  0409   WBC 2.5* 3.0*   RBC 2.52* 3.02*   HEMOGLOBIN 7.9* 9.4*   HEMATOCRIT 24.4* 29.4*   MCV 96.8 97.4   MCH 31.3 31.1   MCHC 32.4* 32.0*   RDW 45.7 46.0   PLATELETCT 138* 164   MPV 10.8 10.9       Recent Labs     10/21/22  0134 10/22/22  0049 10/23/22  0409   SODIUM 140 135 138   POTASSIUM 4.2 4.5 4.4   CHLORIDE 100 96 97   CO2 30 28 28   GLUCOSE 82 74 103*   BUN 18 19 17   CREATININE 4.06* 3.91* 3.98*   CALCIUM 9.7 9.7 10.1                 URINALYSIS:  No results found for: COLORURINE, CLARITY, SPECGRAVITY, PHURINE, KETONES, PROTEINURIN, BILIRUBINUR, UROBILU, NITRITE, LEUKESTERAS, OCCULTBLOOD  UPC  No results found for: TOTPROTUR No results found for: CREATININEU      Imaging interpreted by radiologist. Imaging reports reviewed with pertinent findings below  DX-CHEST-PORTABLE (1 VIEW)   Final Result      Improving pulmonary opacities/edema.      Cardiomegaly.      DX-CHEST-PORTABLE (1 VIEW)   Final Result      Increased diffuse interstitial pulmonary edema.      CT-CTA CHEST PULMONARY ARTERY  W/ RECONS   Final Result      1.  No pulmonary artery emboli identified. Subsegmental low pulmonary artery branches are obscured by motion and misregistration artifact.      2.  Mild diffuse groundglass opacities with lungs consistent with edema/possible pneumonitis. Small bilateral pleural effusions.      3.  6.2 mm and smaller nodules within the lungs unchanged. A 2.7 mm nodule within lingula not previously seen.      4.  Atherosclerotic changes.      Multiple nodules 6 mm-8 mm:   Fleischner Society pulmonary nodule recommendations:   Low Risk: CT at 3-6 months, then consider CT at 18-24 months      High Risk: CT at 3-6 months, then at 18-24 months      Comments: Use most suspicious nodule as guide to management. Follow-up intervals may vary according to size and risk.      Low Risk - Minimal or absent history of smoking and of other known risk factors.      High Risk - History of smoking or of other known risk factors.      Note: These recommendations do not apply to lung cancer screening, patients with immunosuppression, or patients with known primary cancer.      Fleischner Society 2017 Guidelines for Management of Incidentally Detected Pulmonary Nodules in Adults               CT-ABDOMEN-PELVIS W/O   Final Result      1.  No free intraperitoneal air.      2.  No evidence of bowel obstruction or acute appendicitis. No free fluid.      3.  Surgical absence left kidney. No right hydronephrosis or urolithiasis.      4.  Bilateral lung base atelectasis/edema and small pleural effusions.      DX-CHEST-PORTABLE (1 VIEW)   Final Result         1.  Hazy left pulmonary infiltrates   2.  Trace left pleural effusion   3.  Cardiomegaly   4.  Lucency beneath the right hemidiaphragm, appearance raising concern for intra-abdominal free air. Further evaluation with CT of the abdomen for further characterization.      These findings were discussed with the patient's clinician, Klaus Campa, on 10/21/2022 7:07 AM.       DX-CHEST-PORTABLE (1 VIEW)   Final Result      Increasing lung volumes with worsening peripheral right basilar opacity from atelectasis or consolidation      Interstitial edema and cardiac enlargement as before      DX-CHEST-PORTABLE (1 VIEW)   Final Result      1.  Cardiomegaly with evidence of mild fluid overload..            No current facility-administered medications for this encounter.     Current Outpatient Medications   Medication Sig Dispense Refill    [START ON 10/24/2022] amoxicillin-clavulanate (AUGMENTIN) 500-125 MG Tab Take 1 Tablet by mouth every day for 2 days. 2 Tablet 0    doxycycline monohydrate (ADOXA) 100 MG tablet Take 1 Tablet by mouth every 12 hours for 2 days. 4 Tablet 0    sevelamer carbonate (RENVELA) 800 MG Tab tablet Take 1 Tablet by mouth 3 times a day with meals. 270 Tablet 0    aspirin EC (ECOTRIN) 81 MG Tablet Delayed Response Take 81 mg by mouth every day.      Blood Glucose Meter Kit Test blood sugar as recommended by provider. Freestyle Pearl blood glucose monitoring kit. 1 Kit 0    Blood Glucose Test Strips Use one Freestyle Pearl strip to test blood sugar once daily . 100 Strip 0    Lancets Use one Freestyle Pearl lancet to test blood sugar once daily . 100 Each 0    Alcohol Swabs Wipe site with prep pad prior to injection. 100 Each 0    hydrALAZINE (APRESOLINE) 100 MG tablet Take 1 Tablet by mouth 2 times a day. (Patient taking differently: Take 100 mg by mouth 2 times a day. Per pharmacy pt last filled on 8/26/2022 for 90 day supply) 180 Tablet 3    amLODIPine (NORVASC) 10 MG Tab Take 1 Tablet by mouth every day. (Patient taking differently: Take 10 mg by mouth every day. Per pharmacy pt last filled on 8/26/2022 for 90 day supply) 90 Tablet 3    levothyroxine (SYNTHROID) 50 MCG Tab TOME FERNANDO TABLETA POR VIA ORAL CADA MANANA ON AN EMPTY STOMACH (Patient taking differently: Take 50 mcg by mouth every morning on an empty stomach. TOME FERNANDO TABLETA POR VIA ORAL CADA MANANA ON AN  EMPTY STOMACH  Per pharmacy pt last filled on 8/26/2022 for 90 day supply) 90 Tablet 3    apixaban (ELIQUIS) 2.5mg Tab Take 1 Tablet by mouth 2 times a day. Indications: Thromboembolism secondary to Atrial Fibrillation 180 Tablet 3    ROPINIRole (REQUIP) 1 MG Tab Take 1 Tablet by mouth 2 times a day. (Patient taking differently: Take 1 mg by mouth 2 times a day. Per pharmacy pt last filled on 9/10/2022 for 90 day course) 180 Tablet 1    lisinopril (PRINIVIL) 40 MG tablet TOME FERNANDO TABLETA TODOS LOS MCCORMICK (Patient taking differently: Take 40 mg by mouth every day.) 90 Tablet 3    pioglitazone (ACTOS) 30 MG Tab TOME FERNANDO TABLETA TODOS LOS MCCORMICK (Patient taking differently: Take 30 mg by mouth every day. Per pharmacy pt last filled on 8/14/2022 90 day supply) 90 Tablet 3         Assessment/Plan  71 y.o. female with a history of diabetes, hypertension, ESRD on dialysis Monday Wednesday Friday who presented 10/19/2022 with shortness of breath.     1.  ESRD on dialysis Monday Wednesday Friday.  Anuric.  Stable.  No acute need for dialysis today.  Continue Monday Wednesday Friday schedule.  Patient has been dialyzed down to 56.2 kg, so I have set her outpatient target weight to 56.5 kg.  Her previous target weight had been 63.5 kg.  Check labs daily.    2.  Dialysis access: Left arm AV fistula, patent.  Continue left arm precautions.    3.  Acute hypoxic respiratory failure, now resolved after 4 days of dialysis and dialyzing the patient down to 56.2 kg.      4.  Anemia of chronic disease.  Persistent.  Continue Epogen thrice weekly with dialysis.  Check CBC at least 3 times a week.    5.  Pancytopenia, mild, chronic, and persistent.  Patient had a bone marrow biopsy with cancer care specialist in the past that showed no significant abnormalities.  Check CBC daily while inpatient.    OK to discharge from Nephrology standpoint.   There is no need for outpatient nephrology clinic follow up appointment, as the patient will be  seen by a nephrologist at their outpatient dialysis center.     Priyank Villar MD  Nephrology  [facility]

## 2022-10-23 NOTE — DISCHARGE INSTRUCTIONS
Discharge Instructions    Discharged to home by car with relative. Discharged via walking, hospital escort: Refused.  Special equipment needed: Not Applicable and Cane    Be sure to schedule a follow-up appointment with your primary care doctor or any specialists as instructed.     Discharge Plan:   Diet Plan: Discussed  Activity Level: Discussed  Confirmed Follow up Appointment: Appointment Scheduled  Confirmed Symptoms Management: Discussed  Medication Reconciliation Updated: Yes  Influenza Vaccine Indication: Indicated: 65 years and older  Influenza Vaccine Given - only chart on this line when given: Influenza Vaccine Given (See MAR)    I understand that a diet low in cholesterol, fat, and sodium is recommended for good health. Unless I have been given specific instructions below for another diet, I accept this instruction as my diet prescription.   Other diet: Renal    Special Instructions: None    -Is this patient being discharged with medication to prevent blood clots?  No    Is patient discharged on Warfarin / Coumadin?   No

## 2022-10-23 NOTE — DISCHARGE PLANNING
Case Management Note:    Per MD, pt clear to d/c today. No oxygen needed. Pt accepted with [facility].

## 2022-10-24 ENCOUNTER — PATIENT OUTREACH (OUTPATIENT)
Dept: MEDICAL GROUP | Facility: MEDICAL CENTER | Age: 72
End: 2022-10-24
Payer: MEDICARE

## 2022-10-24 NOTE — PROGRESS NOTES
Subjective:     Tere Gallegos is a 71 y.o. female who presents for Hospital Follow-up.    POST DISCHARGE CALL:  Discharge Date:10/23/2022   Date of Outreach Call: 10/24/2022 11:25 AM  Now that you're home, how are you doing? Fair  Did you receive any new prescriptions? Yes  Were you able to fill those medications? No  How did you fill those prescriptions? *  If not able to fill prescriptions, why? Other (Comment)  Comment:rx sent in 10/24, waiting to  from  pharmacy    Do you have questions about your medications? No  Do you have a follow-up appointment scheduled?Yes  Any issues or paperwork you wish to discuss with your PCP? no  Does patient qualify for CCM Program? No  If patient qualifies, was CCM Program Introduced? *  If patient does not qualify, comment? *  Number of Attempts: 1  Current or previous attempts completed within two business days of discharge? Yes  Provided education regarding treatment plan, medication, self-management, ADLs? Yes  Has patient completed Advance Directive? If yes, advise them to bring to appointment. No  Care Manager phone number provided? Yes  Is there anything else I can help you with? No  Chief Complaint? abdominal pain, fever, n/v  Admitting Dx? abdominal pain, n/v  Discharge Diagnosis? Acute respiratory failure with hypoxia due to volume overload, possible pneumonia  Additional Comments? *    HPI:   Recently hospitalized for pulmonary edema, fever, pneumonia. Treated with IV antibiotics, fluid removal via dialysis, She did complete her last two days of antibiotics. Has home PT scheduled for tomorrow for strengthening.     She has been feeling weak in her arms, light headed, fatigued since discharge. Having some difficulty hearing, worse in the right ear. No pain. Started while she was in the hospital then worsened after discharge. Reports sneezing, runny nose has resolved.     She is going to dialysis three times weekly.       Current medicines (including  reconciliation performed today)  Current Outpatient Medications   Medication Sig Dispense Refill    amoxicillin-clavulanate (AUGMENTIN) 500-125 MG Tab Take 1 Tablet by mouth every day for 2 days. 2 Tablet 0    doxycycline monohydrate (ADOXA) 100 MG tablet Take 1 Tablet by mouth every 12 hours for 2 days. 4 Tablet 0    sevelamer carbonate (RENVELA) 800 MG Tab tablet Take 1 Tablet by mouth 3 times a day with meals. 270 Tablet 0    aspirin EC (ECOTRIN) 81 MG Tablet Delayed Response Take 81 mg by mouth every day.      Blood Glucose Meter Kit Test blood sugar as recommended by provider. Freestyle Pearl blood glucose monitoring kit. 1 Kit 0    Blood Glucose Test Strips Use one Freestyle Pearl strip to test blood sugar once daily . 100 Strip 0    Lancets Use one Freestyle Pearl lancet to test blood sugar once daily . 100 Each 0    Alcohol Swabs Wipe site with prep pad prior to injection. 100 Each 0    hydrALAZINE (APRESOLINE) 100 MG tablet Take 1 Tablet by mouth 2 times a day. (Patient taking differently: Take 100 mg by mouth 2 times a day. Per pharmacy pt last filled on 8/26/2022 for 90 day supply) 180 Tablet 3    amLODIPine (NORVASC) 10 MG Tab Take 1 Tablet by mouth every day. (Patient taking differently: Take 10 mg by mouth every day. Per pharmacy pt last filled on 8/26/2022 for 90 day supply) 90 Tablet 3    levothyroxine (SYNTHROID) 50 MCG Tab TOME FERNANDO TABLETA POR VIA ORAL CADA MANANA ON AN EMPTY STOMACH (Patient taking differently: Take 50 mcg by mouth every morning on an empty stomach. TOME FERNANDO TABLETA POR VIA ORAL CADA MANANA ON AN EMPTY STOMACH  Per pharmacy pt last filled on 8/26/2022 for 90 day supply) 90 Tablet 3    apixaban (ELIQUIS) 2.5mg Tab Take 1 Tablet by mouth 2 times a day. Indications: Thromboembolism secondary to Atrial Fibrillation 180 Tablet 3    ROPINIRole (REQUIP) 1 MG Tab Take 1 Tablet by mouth 2 times a day. (Patient taking differently: Take 1 mg by mouth 2 times a day. Per pharmacy pt last  "filled on 9/10/2022 for 90 day course) 180 Tablet 1    lisinopril (PRINIVIL) 40 MG tablet TOME FERNANDO TABLETA TODOS LOS MCCORMICK (Patient taking differently: Take 40 mg by mouth every day.) 90 Tablet 3    pioglitazone (ACTOS) 30 MG Tab TOME FERNANDO TABLETA TODOS LOS MCCORMICK (Patient taking differently: Take 30 mg by mouth every day. Per pharmacy pt last filled on 8/14/2022 90 day supply) 90 Tablet 3     No current facility-administered medications for this visit.       Allergies:   Patient has no known allergies.    Social History     Tobacco Use    Smoking status: Never    Smokeless tobacco: Never   Vaping Use    Vaping Use: Never used   Substance Use Topics    Alcohol use: No     Alcohol/week: 0.0 oz    Drug use: No       ROS:  Positive ROS as per HPI, all other systems reviewed and are negative    Objective:     Vitals:    10/27/22 1514   BP: 134/56   Pulse: 81   Resp: 16   Temp: 36.1 °C (96.9 °F)   TempSrc: Temporal   SpO2: 97%   Weight: 59.8 kg (131 lb 12.8 oz)   Height: 1.6 m (5' 3\")     Body mass index is 23.35 kg/m².    Physical Exam:  Constitutional: Alert, no distress.  Skin: Warm, dry, good turgor, no rashes in visible areas.  Eye: Equal, round and reactive, conjunctiva clear, lids normal.  ENMT: Lips without lesions, good dentition, oropharynx clear.  Neck: Trachea midline, no masses, no thyromegaly. No cervical or supraclavicular lymphadenopathy  Respiratory: Unlabored respiratory effort, lungs clear to auscultation, no wheezes, no ronchi.  Cardiovascular: Normal S1, S2, no murmur, no edema.  Psych: Alert and oriented x3, normal affect and mood.    Assessment and Plan:   1. Pneumonia due to infectious organism, unspecified laterality, unspecified part of lung  Stable post discharge  Off Supplemental oxygen and doing well  Completed antibiotics    2. Dysfunction of both eustachian tubes  Unstable  Advised flonase 1-2 times daily for 2-4 weeks    - Chart and discharge summary were reviewed.   - Hospitalization and " results reviewed with patient.   - Medications reviewed including instructions regarding high risk medications, dosing and side effects.  - Recommended Services: No services needed at this time  - Advance directive/POLST on file?  No     Follow-up:Return in about 4 weeks (around 11/24/2022).    Face-to-face transitional care management services with HIGH (today's visit is within days post discharge & LACE+ score 59+) medical decision complexity were provided.     LACE+ Historical Score Over Time (0-28: Low, 29-58: Medium, 59+: High): 82      Critical Care

## 2022-10-26 ENCOUNTER — HOSPITAL ENCOUNTER (OUTPATIENT)
Dept: LAB | Facility: MEDICAL CENTER | Age: 72
End: 2022-10-26
Attending: NURSE PRACTITIONER
Payer: MEDICARE

## 2022-10-26 DIAGNOSIS — Z13.220 ENCOUNTER FOR SCREENING FOR LIPID DISORDER: ICD-10-CM

## 2022-10-26 DIAGNOSIS — R63.0 POOR APPETITE: ICD-10-CM

## 2022-10-26 DIAGNOSIS — G25.81 RLS (RESTLESS LEGS SYNDROME): ICD-10-CM

## 2022-10-26 DIAGNOSIS — R74.8 ELEVATED LIPASE: ICD-10-CM

## 2022-10-26 DIAGNOSIS — Z13.21 ENCOUNTER FOR VITAMIN DEFICIENCY SCREENING: ICD-10-CM

## 2022-10-26 DIAGNOSIS — R79.89 ELEVATED SERUM FREE T4 LEVEL: ICD-10-CM

## 2022-10-26 DIAGNOSIS — E03.8 SUBCLINICAL HYPOTHYROIDISM: ICD-10-CM

## 2022-10-26 LAB
ALBUMIN SERPL BCP-MCNC: 4.6 G/DL (ref 3.2–4.9)
ALBUMIN/GLOB SERPL: 1.6 G/DL
ALP SERPL-CCNC: 73 U/L (ref 30–99)
ALT SERPL-CCNC: 17 U/L (ref 2–50)
ANION GAP SERPL CALC-SCNC: 13 MMOL/L (ref 7–16)
AST SERPL-CCNC: 19 U/L (ref 12–45)
BASOPHILS # BLD AUTO: 1.4 % (ref 0–1.8)
BASOPHILS # BLD: 0.03 K/UL (ref 0–0.12)
BILIRUB SERPL-MCNC: 0.3 MG/DL (ref 0.1–1.5)
BUN SERPL-MCNC: 10 MG/DL (ref 8–22)
CALCIUM SERPL-MCNC: 10 MG/DL (ref 8.4–10.2)
CHLORIDE SERPL-SCNC: 93 MMOL/L (ref 96–112)
CHOLEST SERPL-MCNC: 154 MG/DL (ref 100–199)
CO2 SERPL-SCNC: 30 MMOL/L (ref 20–33)
CREAT SERPL-MCNC: 2.86 MG/DL (ref 0.5–1.4)
EOSINOPHIL # BLD AUTO: 0.05 K/UL (ref 0–0.51)
EOSINOPHIL NFR BLD: 2.3 % (ref 0–6.9)
ERYTHROCYTE [DISTWIDTH] IN BLOOD BY AUTOMATED COUNT: 46.6 FL (ref 35.9–50)
FASTING STATUS PATIENT QL REPORTED: NORMAL
GFR SERPLBLD CREATININE-BSD FMLA CKD-EPI: 17 ML/MIN/1.73 M 2
GLOBULIN SER CALC-MCNC: 2.9 G/DL (ref 1.9–3.5)
GLUCOSE SERPL-MCNC: 104 MG/DL (ref 65–99)
HCT VFR BLD AUTO: 29.1 % (ref 37–47)
HDLC SERPL-MCNC: 36 MG/DL
HGB BLD-MCNC: 9.5 G/DL (ref 12–16)
IMM GRANULOCYTES # BLD AUTO: 0.03 K/UL (ref 0–0.11)
IMM GRANULOCYTES NFR BLD AUTO: 1.4 % (ref 0–0.9)
LDLC SERPL CALC-MCNC: 65 MG/DL
LIPASE SERPL-CCNC: 63 U/L (ref 7–58)
LYMPHOCYTES # BLD AUTO: 0.57 K/UL (ref 1–4.8)
LYMPHOCYTES NFR BLD: 26.6 % (ref 22–41)
MCH RBC QN AUTO: 31.5 PG (ref 27–33)
MCHC RBC AUTO-ENTMCNC: 32.6 G/DL (ref 33.6–35)
MCV RBC AUTO: 96.4 FL (ref 81.4–97.8)
MONOCYTES # BLD AUTO: 0.27 K/UL (ref 0–0.85)
MONOCYTES NFR BLD AUTO: 12.6 % (ref 0–13.4)
NEUTROPHILS # BLD AUTO: 1.19 K/UL (ref 2–7.15)
NEUTROPHILS NFR BLD: 55.7 % (ref 44–72)
NRBC # BLD AUTO: 0 K/UL
NRBC BLD-RTO: 0 /100 WBC
PLATELET # BLD AUTO: 186 K/UL (ref 164–446)
PMV BLD AUTO: 10.9 FL (ref 9–12.9)
POTASSIUM SERPL-SCNC: 3.5 MMOL/L (ref 3.6–5.5)
PROT SERPL-MCNC: 7.5 G/DL (ref 6–8.2)
RBC # BLD AUTO: 3.02 M/UL (ref 4.2–5.4)
SODIUM SERPL-SCNC: 136 MMOL/L (ref 135–145)
T4 FREE SERPL-MCNC: 2.22 NG/DL (ref 0.93–1.7)
TRIGL SERPL-MCNC: 266 MG/DL (ref 0–149)
TSH SERPL DL<=0.005 MIU/L-ACNC: 1.42 UIU/ML (ref 0.38–5.33)
VIT B12 SERPL-MCNC: >4000 PG/ML (ref 211–911)
WBC # BLD AUTO: 2.1 K/UL (ref 4.8–10.8)

## 2022-10-26 PROCEDURE — 84439 ASSAY OF FREE THYROXINE: CPT

## 2022-10-26 PROCEDURE — 83690 ASSAY OF LIPASE: CPT

## 2022-10-26 PROCEDURE — 84443 ASSAY THYROID STIM HORMONE: CPT

## 2022-10-26 PROCEDURE — 85025 COMPLETE CBC W/AUTO DIFF WBC: CPT

## 2022-10-26 PROCEDURE — 80061 LIPID PANEL: CPT

## 2022-10-26 PROCEDURE — 82607 VITAMIN B-12: CPT

## 2022-10-26 PROCEDURE — 36415 COLL VENOUS BLD VENIPUNCTURE: CPT

## 2022-10-26 PROCEDURE — 80053 COMPREHEN METABOLIC PANEL: CPT

## 2022-10-27 ENCOUNTER — OFFICE VISIT (OUTPATIENT)
Dept: MEDICAL GROUP | Facility: MEDICAL CENTER | Age: 72
End: 2022-10-27
Payer: MEDICARE

## 2022-10-27 VITALS
SYSTOLIC BLOOD PRESSURE: 134 MMHG | DIASTOLIC BLOOD PRESSURE: 56 MMHG | WEIGHT: 131.8 LBS | HEART RATE: 81 BPM | HEIGHT: 63 IN | RESPIRATION RATE: 16 BRPM | TEMPERATURE: 96.9 F | BODY MASS INDEX: 23.35 KG/M2 | OXYGEN SATURATION: 97 %

## 2022-10-27 DIAGNOSIS — H69.93 DYSFUNCTION OF BOTH EUSTACHIAN TUBES: ICD-10-CM

## 2022-10-27 DIAGNOSIS — J18.9 PNEUMONIA DUE TO INFECTIOUS ORGANISM, UNSPECIFIED LATERALITY, UNSPECIFIED PART OF LUNG: ICD-10-CM

## 2022-10-27 PROBLEM — R74.8 CARDIAC ENZYMES ELEVATED: Status: RESOLVED | Noted: 2022-08-24 | Resolved: 2022-10-27

## 2022-10-27 PROCEDURE — 99214 OFFICE O/P EST MOD 30 MIN: CPT | Performed by: NURSE PRACTITIONER

## 2022-10-27 RX ORDER — FLUTICASONE PROPIONATE 50 MCG
1 SPRAY, SUSPENSION (ML) NASAL DAILY
Qty: 16 G | Refills: 0 | Status: SHIPPED | OUTPATIENT
Start: 2022-10-27 | End: 2022-11-27

## 2022-10-27 ASSESSMENT — FIBROSIS 4 INDEX: FIB4 SCORE: 1.78

## 2022-11-02 ENCOUNTER — APPOINTMENT (OUTPATIENT)
Dept: MEDICAL GROUP | Facility: MEDICAL CENTER | Age: 72
End: 2022-11-02
Payer: MEDICARE

## 2022-11-02 ENCOUNTER — TELEPHONE (OUTPATIENT)
Dept: NEPHROLOGY | Facility: MEDICAL CENTER | Age: 72
End: 2022-11-02

## 2022-11-02 DIAGNOSIS — Z94.0 KIDNEY TRANSPLANT RECIPIENT: ICD-10-CM

## 2022-11-02 DIAGNOSIS — Z96.0 URETERAL STENT PRESENT: ICD-10-CM

## 2022-11-02 NOTE — TELEPHONE ENCOUNTER
Nurse Clarke with [facility] called, patient has received her kidney transplant. During transplant, ureter stent was placed and is needing to be removed within 4-8 weeks. Patient also has mitchell catheter needs to be removed next week.  Nurse Clarke requesting recommendations for Urologist in [place] for patient to get stent removed.

## 2022-11-02 NOTE — PROGRESS NOTES
Patient with ESRD on dialysis, received kidney transplant on 10/31/22. Ureteral stent in place that needs to be removed sometime between Nov 28 and Dec 23, 2022, which is 4-8 weeks after transplant.     Will place referral to Urology.     Priyank Villar MD  Nephrology  [facility], [place]

## 2022-11-08 ENCOUNTER — APPOINTMENT (OUTPATIENT)
Dept: RADIOLOGY | Facility: MEDICAL CENTER | Age: 72
DRG: 698 | End: 2022-11-08
Attending: EMERGENCY MEDICINE
Payer: MEDICARE

## 2022-11-08 ENCOUNTER — APPOINTMENT (OUTPATIENT)
Dept: RADIOLOGY | Facility: MEDICAL CENTER | Age: 72
DRG: 698 | End: 2022-11-08
Attending: INTERNAL MEDICINE
Payer: MEDICARE

## 2022-11-08 ENCOUNTER — HOSPITAL ENCOUNTER (INPATIENT)
Facility: MEDICAL CENTER | Age: 72
LOS: 2 days | DRG: 698 | End: 2022-11-10
Attending: EMERGENCY MEDICINE | Admitting: INTERNAL MEDICINE
Payer: MEDICARE

## 2022-11-08 DIAGNOSIS — E87.70 HYPERVOLEMIA, UNSPECIFIED HYPERVOLEMIA TYPE: ICD-10-CM

## 2022-11-08 DIAGNOSIS — Z94.0 KIDNEY TRANSPLANT RECIPIENT: ICD-10-CM

## 2022-11-08 DIAGNOSIS — T86.11 ACUTE RENAL TRANSPLANT REJECTION: ICD-10-CM

## 2022-11-08 DIAGNOSIS — G89.18 ACUTE POST-OPERATIVE PAIN: ICD-10-CM

## 2022-11-08 DIAGNOSIS — R05.9 COUGH, UNSPECIFIED TYPE: ICD-10-CM

## 2022-11-08 DIAGNOSIS — T86.91 TRANSPLANT REJECTION: ICD-10-CM

## 2022-11-08 LAB
ALBUMIN SERPL BCP-MCNC: 3.7 G/DL (ref 3.2–4.9)
ALBUMIN/GLOB SERPL: 1.2 G/DL
ALP SERPL-CCNC: 72 U/L (ref 30–99)
ALT SERPL-CCNC: <5 U/L (ref 2–50)
ANION GAP SERPL CALC-SCNC: 14 MMOL/L (ref 7–16)
APPEARANCE UR: ABNORMAL
AST SERPL-CCNC: 20 U/L (ref 12–45)
BACTERIA #/AREA URNS HPF: ABNORMAL /HPF
BASOPHILS # BLD AUTO: 0.2 % (ref 0–1.8)
BASOPHILS # BLD: 0.01 K/UL (ref 0–0.12)
BILIRUB SERPL-MCNC: 0.7 MG/DL (ref 0.1–1.5)
BILIRUB UR QL STRIP.AUTO: NEGATIVE
BUN SERPL-MCNC: 32 MG/DL (ref 8–22)
CALCIUM SERPL-MCNC: 9.4 MG/DL (ref 8.4–10.2)
CHLORIDE SERPL-SCNC: 89 MMOL/L (ref 96–112)
CO2 SERPL-SCNC: 27 MMOL/L (ref 20–33)
COLOR UR: YELLOW
CREAT SERPL-MCNC: 3.57 MG/DL (ref 0.5–1.4)
EKG IMPRESSION: NORMAL
EOSINOPHIL # BLD AUTO: 0.07 K/UL (ref 0–0.51)
EOSINOPHIL NFR BLD: 1.2 % (ref 0–6.9)
EPI CELLS #/AREA URNS HPF: ABNORMAL /HPF
ERYTHROCYTE [DISTWIDTH] IN BLOOD BY AUTOMATED COUNT: 49.9 FL (ref 35.9–50)
FLUAV RNA SPEC QL NAA+PROBE: NEGATIVE
FLUBV RNA SPEC QL NAA+PROBE: NEGATIVE
GFR SERPLBLD CREATININE-BSD FMLA CKD-EPI: 13 ML/MIN/1.73 M 2
GLOBULIN SER CALC-MCNC: 3 G/DL (ref 1.9–3.5)
GLUCOSE BLD STRIP.AUTO-MCNC: 102 MG/DL (ref 65–99)
GLUCOSE BLD STRIP.AUTO-MCNC: 106 MG/DL (ref 65–99)
GLUCOSE SERPL-MCNC: 95 MG/DL (ref 65–99)
GLUCOSE UR STRIP.AUTO-MCNC: NEGATIVE MG/DL
HCT VFR BLD AUTO: 28.3 % (ref 37–47)
HGB BLD-MCNC: 9.3 G/DL (ref 12–16)
IMM GRANULOCYTES # BLD AUTO: 0.04 K/UL (ref 0–0.11)
IMM GRANULOCYTES NFR BLD AUTO: 0.7 % (ref 0–0.9)
KETONES UR STRIP.AUTO-MCNC: ABNORMAL MG/DL
LEUKOCYTE ESTERASE UR QL STRIP.AUTO: ABNORMAL
LIPASE SERPL-CCNC: 40 U/L (ref 7–58)
LYMPHOCYTES # BLD AUTO: 0.41 K/UL (ref 1–4.8)
LYMPHOCYTES NFR BLD: 7 % (ref 22–41)
MCH RBC QN AUTO: 30.4 PG (ref 27–33)
MCHC RBC AUTO-ENTMCNC: 32.9 G/DL (ref 33.6–35)
MCV RBC AUTO: 92.5 FL (ref 81.4–97.8)
MICRO URNS: ABNORMAL
MONOCYTES # BLD AUTO: 0.54 K/UL (ref 0–0.85)
MONOCYTES NFR BLD AUTO: 9.2 % (ref 0–13.4)
MUCOUS THREADS #/AREA URNS HPF: ABNORMAL /HPF
NEUTROPHILS # BLD AUTO: 4.79 K/UL (ref 2–7.15)
NEUTROPHILS NFR BLD: 81.7 % (ref 44–72)
NITRITE UR QL STRIP.AUTO: NEGATIVE
NRBC # BLD AUTO: 0 K/UL
NRBC BLD-RTO: 0 /100 WBC
NT-PROBNP SERPL IA-MCNC: ABNORMAL PG/ML (ref 0–125)
PH UR STRIP.AUTO: 7 [PH] (ref 5–8)
PLATELET # BLD AUTO: 113 K/UL (ref 164–446)
PMV BLD AUTO: 11.3 FL (ref 9–12.9)
POTASSIUM SERPL-SCNC: 3.5 MMOL/L (ref 3.6–5.5)
PROT SERPL-MCNC: 6.7 G/DL (ref 6–8.2)
PROT UR QL STRIP: 100 MG/DL
RBC # BLD AUTO: 3.06 M/UL (ref 4.2–5.4)
RBC # URNS HPF: ABNORMAL /HPF
RBC UR QL AUTO: ABNORMAL
RSV RNA SPEC QL NAA+PROBE: NEGATIVE
SARS-COV-2 RNA RESP QL NAA+PROBE: NOTDETECTED
SODIUM SERPL-SCNC: 130 MMOL/L (ref 135–145)
SP GR UR STRIP.AUTO: 1.01
SPECIMEN SOURCE: NORMAL
TROPONIN T SERPL-MCNC: 60 NG/L (ref 6–19)
WBC # BLD AUTO: 5.9 K/UL (ref 4.8–10.8)
WBC #/AREA URNS HPF: ABNORMAL /HPF

## 2022-11-08 PROCEDURE — C9803 HOPD COVID-19 SPEC COLLECT: HCPCS | Performed by: EMERGENCY MEDICINE

## 2022-11-08 PROCEDURE — 80197 ASSAY OF TACROLIMUS: CPT

## 2022-11-08 PROCEDURE — 83690 ASSAY OF LIPASE: CPT

## 2022-11-08 PROCEDURE — 84484 ASSAY OF TROPONIN QUANT: CPT

## 2022-11-08 PROCEDURE — 87799 DETECT AGENT NOS DNA QUANT: CPT

## 2022-11-08 PROCEDURE — 700111 HCHG RX REV CODE 636 W/ 250 OVERRIDE (IP): Performed by: INTERNAL MEDICINE

## 2022-11-08 PROCEDURE — 0241U HCHG SARS-COV-2 COVID-19 NFCT DS RESP RNA 4 TRGT MIC: CPT

## 2022-11-08 PROCEDURE — 99223 1ST HOSP IP/OBS HIGH 75: CPT | Performed by: INTERNAL MEDICINE

## 2022-11-08 PROCEDURE — A9270 NON-COVERED ITEM OR SERVICE: HCPCS | Performed by: HOSPITALIST

## 2022-11-08 PROCEDURE — 700111 HCHG RX REV CODE 636 W/ 250 OVERRIDE (IP): Performed by: EMERGENCY MEDICINE

## 2022-11-08 PROCEDURE — 99223 1ST HOSP IP/OBS HIGH 75: CPT | Mod: AI | Performed by: INTERNAL MEDICINE

## 2022-11-08 PROCEDURE — 94760 N-INVAS EAR/PLS OXIMETRY 1: CPT

## 2022-11-08 PROCEDURE — 71045 X-RAY EXAM CHEST 1 VIEW: CPT

## 2022-11-08 PROCEDURE — 93005 ELECTROCARDIOGRAM TRACING: CPT

## 2022-11-08 PROCEDURE — A9270 NON-COVERED ITEM OR SERVICE: HCPCS | Performed by: INTERNAL MEDICINE

## 2022-11-08 PROCEDURE — 80053 COMPREHEN METABOLIC PANEL: CPT

## 2022-11-08 PROCEDURE — 85025 COMPLETE CBC W/AUTO DIFF WBC: CPT

## 2022-11-08 PROCEDURE — 93005 ELECTROCARDIOGRAM TRACING: CPT | Performed by: EMERGENCY MEDICINE

## 2022-11-08 PROCEDURE — 36415 COLL VENOUS BLD VENIPUNCTURE: CPT

## 2022-11-08 PROCEDURE — 81001 URINALYSIS AUTO W/SCOPE: CPT

## 2022-11-08 PROCEDURE — 90935 HEMODIALYSIS ONE EVALUATION: CPT

## 2022-11-08 PROCEDURE — 99285 EMERGENCY DEPT VISIT HI MDM: CPT

## 2022-11-08 PROCEDURE — 770020 HCHG ROOM/CARE - TELE (206)

## 2022-11-08 PROCEDURE — 700102 HCHG RX REV CODE 250 W/ 637 OVERRIDE(OP): Performed by: INTERNAL MEDICINE

## 2022-11-08 PROCEDURE — 96375 TX/PRO/DX INJ NEW DRUG ADDON: CPT

## 2022-11-08 PROCEDURE — 83880 ASSAY OF NATRIURETIC PEPTIDE: CPT

## 2022-11-08 PROCEDURE — 82962 GLUCOSE BLOOD TEST: CPT

## 2022-11-08 PROCEDURE — 5A1D70Z PERFORMANCE OF URINARY FILTRATION, INTERMITTENT, LESS THAN 6 HOURS PER DAY: ICD-10-PCS | Performed by: INTERNAL MEDICINE

## 2022-11-08 PROCEDURE — 96374 THER/PROPH/DIAG INJ IV PUSH: CPT

## 2022-11-08 PROCEDURE — 700102 HCHG RX REV CODE 250 W/ 637 OVERRIDE(OP): Performed by: HOSPITALIST

## 2022-11-08 PROCEDURE — 76776 US EXAM K TRANSPL W/DOPPLER: CPT

## 2022-11-08 RX ORDER — MYCOPHENOLATE MOFETIL 500 MG/1
500 TABLET ORAL 2 TIMES DAILY
Status: ON HOLD | COMMUNITY
End: 2023-04-08

## 2022-11-08 RX ORDER — HEPARIN SODIUM 1000 [USP'U]/ML
1000 INJECTION, SOLUTION INTRAVENOUS; SUBCUTANEOUS PRN
Status: DISCONTINUED | OUTPATIENT
Start: 2022-11-08 | End: 2022-11-08

## 2022-11-08 RX ORDER — CARVEDILOL 3.12 MG/1
3.12 TABLET ORAL 2 TIMES DAILY WITH MEALS
Status: DISCONTINUED | OUTPATIENT
Start: 2022-11-08 | End: 2022-11-09

## 2022-11-08 RX ORDER — AMOXICILLIN 250 MG
2 CAPSULE ORAL 2 TIMES DAILY
Status: DISCONTINUED | OUTPATIENT
Start: 2022-11-08 | End: 2022-11-10 | Stop reason: HOSPADM

## 2022-11-08 RX ORDER — PREDNISONE 10 MG/1
10 TABLET ORAL DAILY
Status: DISCONTINUED | OUTPATIENT
Start: 2022-11-08 | End: 2022-11-08

## 2022-11-08 RX ORDER — ATORVASTATIN CALCIUM 20 MG/1
20 TABLET, FILM COATED ORAL NIGHTLY
COMMUNITY
End: 2022-12-27 | Stop reason: SDUPTHER

## 2022-11-08 RX ORDER — SODIUM CHLORIDE 9 MG/ML
250 INJECTION, SOLUTION INTRAVENOUS
Status: DISCONTINUED | OUTPATIENT
Start: 2022-11-08 | End: 2022-11-10 | Stop reason: HOSPADM

## 2022-11-08 RX ORDER — HYDROMORPHONE HYDROCHLORIDE 1 MG/ML
0.5 INJECTION, SOLUTION INTRAMUSCULAR; INTRAVENOUS; SUBCUTANEOUS
Status: ACTIVE | OUTPATIENT
Start: 2022-11-08 | End: 2022-11-08

## 2022-11-08 RX ORDER — ACETAMINOPHEN 325 MG/1
650 TABLET ORAL EVERY 6 HOURS PRN
Status: DISCONTINUED | OUTPATIENT
Start: 2022-11-08 | End: 2022-11-10 | Stop reason: HOSPADM

## 2022-11-08 RX ORDER — VALGANCICLOVIR 450 MG/1
450 TABLET, FILM COATED ORAL DAILY
Status: DISCONTINUED | OUTPATIENT
Start: 2022-11-08 | End: 2022-11-10 | Stop reason: HOSPADM

## 2022-11-08 RX ORDER — LEVOTHYROXINE SODIUM 0.05 MG/1
50 TABLET ORAL
Status: DISCONTINUED | OUTPATIENT
Start: 2022-11-08 | End: 2022-11-08

## 2022-11-08 RX ORDER — SULFAMETHOXAZOLE AND TRIMETHOPRIM 800; 160 MG/1; MG/1
0.5 TABLET ORAL DAILY
Status: DISCONTINUED | OUTPATIENT
Start: 2022-11-08 | End: 2022-11-08

## 2022-11-08 RX ORDER — MYCOPHENOLATE MOFETIL 250 MG/1
1000 CAPSULE ORAL 2 TIMES DAILY
Status: DISCONTINUED | OUTPATIENT
Start: 2022-11-08 | End: 2022-11-10 | Stop reason: HOSPADM

## 2022-11-08 RX ORDER — CLOTRIMAZOLE 10 MG/1
10 LOZENGE ORAL; TOPICAL 4 TIMES DAILY
Status: DISCONTINUED | OUTPATIENT
Start: 2022-11-08 | End: 2022-11-10 | Stop reason: HOSPADM

## 2022-11-08 RX ORDER — ATORVASTATIN CALCIUM 20 MG/1
20 TABLET, FILM COATED ORAL NIGHTLY
Status: DISCONTINUED | OUTPATIENT
Start: 2022-11-08 | End: 2022-11-10 | Stop reason: HOSPADM

## 2022-11-08 RX ORDER — CLOTRIMAZOLE 10 MG/1
10 LOZENGE ORAL; TOPICAL 4 TIMES DAILY
COMMUNITY
End: 2023-02-06

## 2022-11-08 RX ORDER — SEVELAMER CARBONATE 800 MG/1
800 TABLET, FILM COATED ORAL
Status: DISCONTINUED | OUTPATIENT
Start: 2022-11-08 | End: 2022-11-10 | Stop reason: HOSPADM

## 2022-11-08 RX ORDER — SULFAMETHOXAZOLE AND TRIMETHOPRIM 400; 80 MG/1; MG/1
1 TABLET ORAL DAILY
COMMUNITY
End: 2023-02-06

## 2022-11-08 RX ORDER — TACROLIMUS 1 MG/1
1-2 CAPSULE ORAL 2 TIMES DAILY
Status: ON HOLD | COMMUNITY
End: 2023-04-08

## 2022-11-08 RX ORDER — POLYETHYLENE GLYCOL 3350 17 G/17G
1 POWDER, FOR SOLUTION ORAL
Status: DISCONTINUED | OUTPATIENT
Start: 2022-11-08 | End: 2022-11-10 | Stop reason: HOSPADM

## 2022-11-08 RX ORDER — HEPARIN SODIUM 5000 [USP'U]/ML
5000 INJECTION, SOLUTION INTRAVENOUS; SUBCUTANEOUS EVERY 8 HOURS
Status: DISCONTINUED | OUTPATIENT
Start: 2022-11-08 | End: 2022-11-10 | Stop reason: HOSPADM

## 2022-11-08 RX ORDER — HYDROMORPHONE HYDROCHLORIDE 1 MG/ML
0.5 INJECTION, SOLUTION INTRAMUSCULAR; INTRAVENOUS; SUBCUTANEOUS ONCE
Status: COMPLETED | OUTPATIENT
Start: 2022-11-08 | End: 2022-11-08

## 2022-11-08 RX ORDER — TRAMADOL HYDROCHLORIDE 50 MG/1
50 TABLET ORAL EVERY 8 HOURS PRN
Status: DISPENSED | OUTPATIENT
Start: 2022-11-08 | End: 2022-11-09

## 2022-11-08 RX ORDER — LEVOTHYROXINE SODIUM 0.05 MG/1
50 TABLET ORAL
Status: DISCONTINUED | OUTPATIENT
Start: 2022-11-09 | End: 2022-11-10 | Stop reason: HOSPADM

## 2022-11-08 RX ORDER — VALGANCICLOVIR 450 MG/1
450 TABLET, FILM COATED ORAL DAILY
Status: ON HOLD | COMMUNITY
End: 2022-11-09 | Stop reason: SDUPTHER

## 2022-11-08 RX ORDER — BISACODYL 10 MG
10 SUPPOSITORY, RECTAL RECTAL
Status: DISCONTINUED | OUTPATIENT
Start: 2022-11-08 | End: 2022-11-10 | Stop reason: HOSPADM

## 2022-11-08 RX ORDER — HEPARIN SODIUM 1000 [USP'U]/ML
500 INJECTION, SOLUTION INTRAVENOUS; SUBCUTANEOUS PRN
Status: DISCONTINUED | OUTPATIENT
Start: 2022-11-08 | End: 2022-11-10 | Stop reason: HOSPADM

## 2022-11-08 RX ORDER — PREDNISONE 10 MG/1
10 TABLET ORAL DAILY
Status: DISCONTINUED | OUTPATIENT
Start: 2022-11-09 | End: 2022-11-10 | Stop reason: HOSPADM

## 2022-11-08 RX ORDER — TACROLIMUS 1 MG/1
1 CAPSULE ORAL 2 TIMES DAILY
Status: DISCONTINUED | OUTPATIENT
Start: 2022-11-08 | End: 2022-11-10 | Stop reason: HOSPADM

## 2022-11-08 RX ORDER — FLUTICASONE PROPIONATE 50 MCG
1 SPRAY, SUSPENSION (ML) NASAL
Status: DISCONTINUED | OUTPATIENT
Start: 2022-11-08 | End: 2022-11-10 | Stop reason: HOSPADM

## 2022-11-08 RX ORDER — PREDNISONE 5 MG/1
5 TABLET ORAL DAILY
COMMUNITY

## 2022-11-08 RX ORDER — PANTOPRAZOLE SODIUM 40 MG/1
40 TABLET, DELAYED RELEASE ORAL DAILY
COMMUNITY
End: 2022-12-27

## 2022-11-08 RX ORDER — SULFAMETHOXAZOLE AND TRIMETHOPRIM 800; 160 MG/1; MG/1
0.5 TABLET ORAL DAILY
Status: DISCONTINUED | OUTPATIENT
Start: 2022-11-09 | End: 2022-11-10 | Stop reason: HOSPADM

## 2022-11-08 RX ORDER — ONDANSETRON 2 MG/ML
4 INJECTION INTRAMUSCULAR; INTRAVENOUS ONCE
Status: COMPLETED | OUTPATIENT
Start: 2022-11-08 | End: 2022-11-08

## 2022-11-08 RX ORDER — CARVEDILOL 3.12 MG/1
3.12 TABLET ORAL 2 TIMES DAILY WITH MEALS
COMMUNITY
End: 2022-12-08

## 2022-11-08 RX ADMIN — ONDANSETRON 4 MG: 2 INJECTION INTRAMUSCULAR; INTRAVENOUS at 06:50

## 2022-11-08 RX ADMIN — CLOTRIMAZOLE 10 MG: 10 LOZENGE ORAL; TOPICAL at 20:09

## 2022-11-08 RX ADMIN — HEPARIN SODIUM 500 UNITS: 1000 INJECTION, SOLUTION INTRAVENOUS; SUBCUTANEOUS at 15:25

## 2022-11-08 RX ADMIN — TRAMADOL HYDROCHLORIDE 50 MG: 50 TABLET, COATED ORAL at 18:08

## 2022-11-08 RX ADMIN — SEVELAMER CARBONATE 800 MG: 800 TABLET, FILM COATED ORAL at 17:59

## 2022-11-08 RX ADMIN — CLOTRIMAZOLE 10 MG: 10 LOZENGE ORAL; TOPICAL at 15:26

## 2022-11-08 RX ADMIN — CARVEDILOL 3.12 MG: 3.12 TABLET, FILM COATED ORAL at 18:00

## 2022-11-08 RX ADMIN — TACROLIMUS 1 MG: 1 CAPSULE ORAL at 18:00

## 2022-11-08 RX ADMIN — MYCOPHENOLATE MOFETIL 1000 MG: 250 CAPSULE ORAL at 17:59

## 2022-11-08 RX ADMIN — HYDROMORPHONE HYDROCHLORIDE 0.5 MG: 1 INJECTION, SOLUTION INTRAMUSCULAR; INTRAVENOUS; SUBCUTANEOUS at 06:50

## 2022-11-08 RX ADMIN — ATORVASTATIN CALCIUM 20 MG: 20 TABLET, FILM COATED ORAL at 20:09

## 2022-11-08 ASSESSMENT — ENCOUNTER SYMPTOMS
ABDOMINAL PAIN: 1
NERVOUS/ANXIOUS: 0
SHORTNESS OF BREATH: 1
DIZZINESS: 0
COUGH: 1
WEAKNESS: 1
NAUSEA: 0
VOMITING: 0
EYE PAIN: 0
CHILLS: 0
PALPITATIONS: 0
BACK PAIN: 0
BLURRED VISION: 0
SPUTUM PRODUCTION: 0
SHORTNESS OF BREATH: 0
FEVER: 0
INSOMNIA: 0
HEADACHES: 0
ABDOMINAL PAIN: 0

## 2022-11-08 ASSESSMENT — LIFESTYLE VARIABLES
ON A TYPICAL DAY WHEN YOU DRINK ALCOHOL HOW MANY DRINKS DO YOU HAVE: 0
TOTAL SCORE: 0
TOTAL SCORE: 0
HAVE PEOPLE ANNOYED YOU BY CRITICIZING YOUR DRINKING: NO
HAVE YOU EVER FELT YOU SHOULD CUT DOWN ON YOUR DRINKING: NO
CONSUMPTION TOTAL: NEGATIVE
TOTAL SCORE: 0
HOW MANY TIMES IN THE PAST YEAR HAVE YOU HAD 5 OR MORE DRINKS IN A DAY: 0
AVERAGE NUMBER OF DAYS PER WEEK YOU HAVE A DRINK CONTAINING ALCOHOL: 0
EVER FELT BAD OR GUILTY ABOUT YOUR DRINKING: NO
ALCOHOL_USE: NO
EVER HAD A DRINK FIRST THING IN THE MORNING TO STEADY YOUR NERVES TO GET RID OF A HANGOVER: NO

## 2022-11-08 ASSESSMENT — FIBROSIS 4 INDEX
FIB4 SCORE: 6.01
FIB4 SCORE: 3.49
FIB4 SCORE: 3.49

## 2022-11-08 ASSESSMENT — PATIENT HEALTH QUESTIONNAIRE - PHQ9
SUM OF ALL RESPONSES TO PHQ9 QUESTIONS 1 AND 2: 0
2. FEELING DOWN, DEPRESSED, IRRITABLE, OR HOPELESS: NOT AT ALL
1. LITTLE INTEREST OR PLEASURE IN DOING THINGS: NOT AT ALL

## 2022-11-08 ASSESSMENT — PAIN DESCRIPTION - PAIN TYPE: TYPE: ACUTE PAIN

## 2022-11-08 ASSESSMENT — PAIN SCALES - WONG BAKER: WONGBAKER_NUMERICALRESPONSE: HURTS A WHOLE LOT

## 2022-11-08 NOTE — ASSESSMENT & PLAN NOTE
Noted on EKG, A. fib with RVR  Likely provoked due to volume overload  Continue patient's home carvedilol

## 2022-11-08 NOTE — ED NOTES
Pt sleeping & son sleeping.  When I enter the room they awaken to my voice.  Pt reports pain is improved after medication and rates her discomfort 3/10.  Pt is on 2L O2 (she does not wear oxygen @ home).  VS repeated.  Warm blanket provided.

## 2022-11-08 NOTE — ASSESSMENT & PLAN NOTE
High concern given right flank pain, labs showing Acute renal transplant failure (elevated creatinine, microhematuria on UA, BNP greater than 35,000, physical exam with evidence of pulmonary edema).  Renal ultrasound showing lack of diastolic flow in the renal arteries concerning for graft rejection  - Continue patient's medications CellCept, prednisone, tacrolimus.  Hold high-dose steroids.  -Continue prophylaxis medication Bactrim and valganciclovir.    -Continue phosphate binder sevelamer.  Upon discussion with transplant nephrologist, ultrasound findings are consistent with DGI.  No need for transfer

## 2022-11-08 NOTE — ED NOTES
IPad used to introduce myself and ask her if she has any questions.  COVID Swab done.  Urine sample provided.  All samples to lab.

## 2022-11-08 NOTE — ED NOTES
Sugar free pudding and diet soda given to pt so she has something in her stomach before she takes her routine medications.  Son @ BS.

## 2022-11-08 NOTE — ASSESSMENT & PLAN NOTE
Patient had renal transplant unfortunately appears to be having rejection  Nephrology consulted, starting dialysis  Left arm AV fistula

## 2022-11-08 NOTE — ED NOTES
Med rec updated and complete, per pt and pill box at bedside  Allergies reviewed, per pt  Used  services spoke with Edna ([id number]), pt also had a pill box at bedside.  Went over all medications with pharmacists.  Pill box had the times on when pt took her medications last.   Pt is not sure if she started taking RENVELA 800MG, it was not in her pill box

## 2022-11-08 NOTE — ASSESSMENT & PLAN NOTE
Patient is on chronic Eliquis  Will need to hold as patient may need procedures possibly renal biopsy

## 2022-11-08 NOTE — ASSESSMENT & PLAN NOTE
Last echo 08/2022, diastolic stage I noted  Patient will receive dialysis  Holding Lasix due to nephrotoxicity

## 2022-11-08 NOTE — ED PROVIDER NOTES
ED Provider Note    Scribed for Wilfredo Haywood M.D. by Zuleyka Dallas. 11/8/2022  6:38 AM    Primary care provider: ANGELITA Concepcion  Means of arrival: Wheel Chair  History obtained from: Patient  History limited by: None    CHIEF COMPLAINT  Chief Complaint   Patient presents with    Chest Pain     Pt arrives to the ER via POV with family. Pt complains of L sided chest pain that radiates to L arm and neck. Pt received kidney transplant x 1 week ago in [facility]. Onset 0400.  Advised NPO until seen by provider. Pt verbalizes understanding.           HPI  Tere Gallegos is a 71 y.o. female, with significant cardiac history, who presents to the Emergency Department for persistent left sided chest pain onset three hours ago. Her pain radiates into her left arm. She also complains of worsening pain in her right flank and abdomen since her kidney transplant 1 week ago in [place]. However, she notes running out of her pain medication yesterday. Patient has an associated symptom of productive cough, which began soon after her transplant. Denies shortness of breath or fever. Patient is anticoagulated on Eliquis. Last dialysis was yesterday.     REVIEW OF SYSTEMS  Pertinent positives include left chest pain radiating into left arm, right abdominal and flank pain, and productive cough.   Pertinent negatives include no shortness of breath or fever.    All other systems reviewed and negative. See HPI for further details.       PAST MEDICAL HISTORY   has a past medical history of Acquired hypothyroidism (05/04/2020), CAD (coronary artery disease), Chronic diastolic heart failure (HCC) (05/04/2020), Coronary artery disease due to lipid rich plaque, Dental disorder, Diabetes ([facility]), ESRD (end stage renal disease) on dialysis ([facility]) (05/04/2020), Hemodialysis patient ([facility]), Hyperlipidemia, Hypertension, Kidney transplant candidate, Kidney transplant recipient (10/31/2022), Presence of drug-eluting  stent in right coronary artery, QT prolongation (01/22/2020), RLS (restless legs syndrome) (08/05/2016), and Transaminitis (12/22/2018).    SURGICAL HISTORY   has a past surgical history that includes recovery (8/16/2016); other abdominal surgery; other (Left, 2014); zzz cardiac cath (8/16/16); and zzz cardiac cath (9/7/2016).    SOCIAL HISTORY  Social History     Tobacco Use    Smoking status: Never    Smokeless tobacco: Never   Vaping Use    Vaping Use: Never used   Substance Use Topics    Alcohol use: No     Alcohol/week: 0.0 oz    Drug use: No      Social History     Substance and Sexual Activity   Drug Use No       FAMILY HISTORY  Family History   Problem Relation Age of Onset    Diabetes Sister     Other Sister         liver disease    Diabetes Brother     Heart Disease Neg Hx        CURRENT MEDICATIONS  Current Outpatient Medications   Medication Instructions    Alcohol Swabs Wipe site with prep pad prior to injection.    amLODIPine (NORVASC) 10 mg, Oral, DAILY    apixaban (ELIQUIS) 2.5 mg, Oral, 2 TIMES DAILY    aspirin EC (ECOTRIN) 81 mg, Oral, DAILY    Blood Glucose Meter Kit Test blood sugar as recommended by provider. Freestyle Pearl blood glucose monitoring kit.    Blood Glucose Test Strips Use one Freestyle Pearl strip to test blood sugar once daily .    fluticasone (FLONASE) 50 mcg, Nasal, DAILY    hydrALAZINE (APRESOLINE) 100 mg, Oral, 2 TIMES DAILY    Lancets Use one Freestyle Pearl lancet to test blood sugar once daily .    levothyroxine (SYNTHROID) 50 MCG Tab TOME FERNANDO TABLETA POR VIA ORAL CADA MANANA ON AN EMPTY STOMACH    lisinopril (PRINIVIL) 40 MG tablet TOME FERNANDO TABLETA TODOS LOS MCCORMICK    pioglitazone (ACTOS) 30 MG Tab TOME FERNANDO TABLETA TODOS LOS MCCORMICK    ROPINIRole (REQUIP) 1 mg, Oral, 2 TIMES DAILY    sevelamer carbonate (RENVELA) 800 mg, Oral, 3 TIMES DAILY WITH MEALS     ALLERGIES  No Known Allergies    PHYSICAL EXAM  VITAL SIGNS: BP (!) 166/67   Pulse (!) 139   Temp 36.8 °C (98.2 °F)  (Temporal)   Resp (!) 24   Ht 1.524 m (5') Comment: estimation  Wt 66.8 kg (147 lb 4.3 oz)   LMP  (LMP Unknown)   SpO2 100%   BMI 28.76 kg/m²     Nursing note and vitals reviewed.  Constitutional: Well-developed and well-nourished. Moderate  distress secondary to pain.   HENT: Head is normocephalic and atraumatic. Oropharynx is clear and moist without exudate or erythema.   Eyes: Pupils are equal, round, and reactive to light. Conjunctiva are normal.   Cardiovascular: Normal rate and regular rhythm. No murmur heard. Normal radial pulses.  Pulmonary/Chest: Weak cough productive of sputum that elicits pain in chest and abdomen. Breath sounds normal. No wheezes or rales.   Abdominal: Post-op dressing in the right lower quadrant. Appropriate associated tenderness. Soft. No distention    Musculoskeletal: Extremities exhibit normal range of motion without edema or tenderness.   Neurological: Awake, alert and oriented to person, place, and time. No focal deficits noted.  Skin: Skin is warm and dry. No rash.   Psychiatric: Normal mood and affect. Appropriate for clinical situation.    DIAGNOSTIC STUDIES / PROCEDURES    EKG Interpretation  Interpreted by me as below    LABS  Results for orders placed or performed during the hospital encounter of 11/08/22   CBC w/ Differential   Result Value Ref Range    WBC 5.9 4.8 - 10.8 K/uL    RBC 3.06 (L) 4.20 - 5.40 M/uL    Hemoglobin 9.3 (L) 12.0 - 16.0 g/dL    Hematocrit 28.3 (L) 37.0 - 47.0 %    MCV 92.5 81.4 - 97.8 fL    MCH 30.4 27.0 - 33.0 pg    MCHC 32.9 (L) 33.6 - 35.0 g/dL    RDW 49.9 35.9 - 50.0 fL    Platelet Count 113 (L) 164 - 446 K/uL    MPV 11.3 9.0 - 12.9 fL    Neutrophils-Polys 81.70 (H) 44.00 - 72.00 %    Lymphocytes 7.00 (L) 22.00 - 41.00 %    Monocytes 9.20 0.00 - 13.40 %    Eosinophils 1.20 0.00 - 6.90 %    Basophils 0.20 0.00 - 1.80 %    Immature Granulocytes 0.70 0.00 - 0.90 %    Nucleated RBC 0.00 /100 WBC    Neutrophils (Absolute) 4.79 2.00 - 7.15 K/uL     Lymphs (Absolute) 0.41 (L) 1.00 - 4.80 K/uL    Monos (Absolute) 0.54 0.00 - 0.85 K/uL    Eos (Absolute) 0.07 0.00 - 0.51 K/uL    Baso (Absolute) 0.01 0.00 - 0.12 K/uL    Immature Granulocytes (abs) 0.04 0.00 - 0.11 K/uL    NRBC (Absolute) 0.00 K/uL   Complete Metabolic Panel (CMP)   Result Value Ref Range    Sodium 130 (L) 135 - 145 mmol/L    Potassium 3.5 (L) 3.6 - 5.5 mmol/L    Chloride 89 (L) 96 - 112 mmol/L    Co2 27 20 - 33 mmol/L    Anion Gap 14.0 7.0 - 16.0    Glucose 95 65 - 99 mg/dL    Bun 32 (H) 8 - 22 mg/dL    Creatinine 3.57 (H) 0.50 - 1.40 mg/dL    Calcium 9.4 8.4 - 10.2 mg/dL    AST(SGOT) 20 12 - 45 U/L    ALT(SGPT) <5 2 - 50 U/L    Alkaline Phosphatase 72 30 - 99 U/L    Total Bilirubin 0.7 0.1 - 1.5 mg/dL    Albumin 3.7 3.2 - 4.9 g/dL    Total Protein 6.7 6.0 - 8.2 g/dL    Globulin 3.0 1.9 - 3.5 g/dL    A-G Ratio 1.2 g/dL   proBrain Natriuretic Peptide, NT   Result Value Ref Range    NT-proBNP >62383 (H) 0 - 125 pg/mL   Troponin STAT   Result Value Ref Range    Troponin T 60 (H) 6 - 19 ng/L   Lipase   Result Value Ref Range    Lipase 40 7 - 58 U/L   URINALYSIS,CULTURE IF INDICATED    Specimen: Urine, Clean Catch   Result Value Ref Range    Color Yellow     Character Cloudy (A)     Specific Gravity 1.010 <1.035    Ph 7.0 5.0 - 8.0    Glucose Negative Negative mg/dL    Ketones Trace (A) Negative mg/dL    Protein 100 (A) Negative mg/dL    Bilirubin Negative Negative    Nitrite Negative Negative    Leukocyte Esterase Trace (A) Negative    Occult Blood Large (A) Negative    Micro Urine Req Microscopic    ESTIMATED GFR   Result Value Ref Range    GFR (CKD-EPI) 13 (A) >60 mL/min/1.73 m 2   COV-2, FLU A/B, AND RSV BY PCR (2-4 HOURS CEPHEID): Collect NP swab in VTM    Specimen: Respirate   Result Value Ref Range    Influenza virus A RNA Negative Negative    Influenza virus B, PCR Negative Negative    RSV, PCR Negative Negative    SARS-CoV-2 by PCR NotDetected     SARS-CoV-2 Source NP Swab    URINE MICROSCOPIC  (W/UA)   Result Value Ref Range    WBC 5-10 (A) /hpf    -150 (A) /hpf    Bacteria Rare (A) None /hpf    Epithelial Cells Few Few /hpf    Mucous Threads Rare /hpf   EKG   Result Value Ref Range    Report       [facility] Emergency Dept.    Test Date:  2022  Pt Name:    DIOR NICHOLS    Department: [facility]  MRN:        [id number]                      [facility]  Gender:     Female                       Technician: [id number]  :        [id number]                   Requested By:ER TRIAGE PROTOCOL  Order #:    [id number]                    Reading MD:    Measurements  Intervals                                Axis  Rate:       134                          P:  WA:                                      QRS:        58  QRSD:       82                           T:          -8  QT:         332  QTc:        496    Interpretive Statements  ATRIAL FIBRILLATION  VENTRICULAR TRIGEMINY  LATE PRECORDIAL R/S TRANSITION  BORDERLINE ST DEPRESSION, LATERAL LEADS  BORDERLINE PROLONGED QT INTERVAL  BASELINE WANDER IN LEAD(S) V4  Compared to ECG 10/19/2022 01:13:55  Ventricular premature complex(es) now present  ST (T wave) deviation now present  Sinus rhythm no longer pre sent  T-wave abnormality no longer present       All labs reviewed by me.    RADIOLOGY  US-RENAL TRANSPLANT COMP   Final Result            1. Absent diastolic flow in the renal arteries with elevated RI near 1.0, concerning for graft rejection/failure.      2. No renal vein thrombosis.      3. Complex fluid medial to the transplant kidney, measuring 7.4 x 3.4 x 0.9 cm, likely postsurgical.      CRITICAL RESULT READ BACK: Preliminary findings discussed with and critical read back performed by Dr. Haywood in the Emergency Department via telephone on 2022 11:00 AM      DX-CHEST-PORTABLE (1 VIEW)   Final Result      1.  Interstitial and hazy perihilar opacities most likely related to pulmonary edema. Correlate clinically  for infection.   2.  Cardiomegaly.        The radiologist's interpretation of all radiological studies have been reviewed by me.    COURSE & MEDICAL DECISION MAKING  Nursing notes, VS, PMSFHx reviewed in chart.         6:38 AM - Patient seen and examined at bedside. Patient will be treated with Zofran 4mg and Dilaudid 0.5mg.  Ordered EKG, DX-Chest, UA with culture, Tacrolimus blood, BK virus Quant PCR BLD, CBC w/ differential, CMP, proBrain Natriuretic Peptide, Troponin STAT, and Lipase to evaluate her symptoms. The differential diagnoses include but are not limited to: ACS, pneumonia, chest wall pain, and post-op pain. Although PE is possible, it is unlikely as the patient is on Eliquis.    7:07 AM - Review of laboratory results reveal elevated Troponin of 60.     7:52 AM - Review of radiology results reveal possible pulmonary edema with recommendation of clinical correlation for infection. I have ordered CoV-2, Flu A/B, and RSV for further evaluation, and have paged Nephrology for consult.     8:06 AM - I discussed the patient's case and the above findings with Dr. Villar (Nephrology) who agrees to consult. He would like me to order an ultrasound to evaluate her transplant site, and recommends she be transferred back to the transplant facility in [place].     11:00 AM - Spoke with radiology regarding patient's US results. There is concern for graft rejection/failure of the transplant given the lack of diastolic flow in the renal arteries.    11:08 AM  - Discussed the patient's case and ultrasound findings further with Dr. Villar (Nephrology) in the ED.     12:48 PM patient is admitted to ED observation status at this time awaiting acceptance and transfer to transplant facility.    1:18 PM I spoke with Dr. Myrick from [facility] [place] for transfer.  However due to weather and other factors it may be days before the patient can transfer.  Therefore the patient will be admitted to the hospital service  pending transfer to [facility] [place] [facility].      HTN/IDDM FOLLOW UP:  The patient has known hypertension and is being followed by their primary care doctor.    CRITICAL CARE  I provided critical care services, which included medication orders, frequent reevaluations of the patient's condition and response to treatment, ordering and reviewing test results, and discussing the case with various consultants. The critical care time associated with the care of the patient was 35 minutes. Review chart for interventions. This time is exclusive of any other billable procedures.        DISPOSITION:  Patient will be transferred to [facility] in critical condition.      FINAL IMPRESSION  1. Transplant rejection    2. Acute post-operative pain    3. Cough, unspecified type    4. Hypervolemia, unspecified hypervolemia type    The critical care time associated with the care of the patient was 35 minutes.       Zuleyka BRONSON (Scribe), am scribing for, and in the presence of, Wilfredo Haywood M.D..    Electronically signed by: Zuleyka Dallas (Scribe), 11/8/2022    Wilfredo BRONSON M.D. personally performed the services described in this documentation, as scribed by Zuleyka Dallas in my presence, and it is both accurate and complete.    The note accurately reflects work and decisions made by me.  Wilfredo Haywood M.D.  11/8/2022  12:48 PM

## 2022-11-08 NOTE — CONSULTS
Nephrology Consultation    Date of Service  2022    Referring Physician  Misha Mehta M.D.    Consulting Physician  Priyank Villar M.D.    Reason for Consultation  Management of ESRD on HD  S/p recent kidney transplant    History of Presenting Illness  71 y.o. female with a history of diabetes, atrial fibrillation, hypertension, ESRD on dialysis, status post  donor kidney transplant 10/31/2022 at [facility] in [place] complicated by delayed graft function requiring ongoing dialysis who presented 2022 with shortness of breath and chest pain.    This patient is well-known to me from outpatient dialysis at [facility].  The patient received dialysis yesterday, but ultrafiltration was limited by hypotension and cramps.  The patient was instructed after her kidney transplant to drink 2 L of liquid a day to help encourage the kidney transplant to make urine.  The patient was anuric prior to transplant, and has been making more and more urine every day.    The patient presented this morning with left-sided chest pain and arm pain in her right arm.  She also complains of pain over the right lower quadrant kidney allograft.  Patient is anticoagulated with Eliquis for atrial fibrillation.  On my evaluation, the patient is more comfortable, denies chest pain.  She is amenable to extra dialysis today to help with volume overload.  I reviewed the patient's transplant kidney ultrasound showing concern for transplant renal artery stenosis or rejection, and that she will likely need to transfer to transplant center when the weather clears up.      Review of Systems  Review of Systems   Constitutional:  Negative for fever.   Respiratory:  Negative for shortness of breath.    Cardiovascular:  Positive for chest pain and leg swelling.   Gastrointestinal:  Positive for abdominal pain (RLQ).   All other systems reviewed and are negative.    Past Medical History  Past Medical History:   Diagnosis  Date    Acquired hypothyroidism 2020    CAD (coronary artery disease)     Chronic diastolic heart failure ([facility]) 2020    Coronary artery disease due to lipid rich plaque     2 Synergy NOEMI to 100% RCA stent placed    Dental disorder     partial dentures- uppers    Diabetes ([facility])     oral medication    ESRD (end stage renal disease) on dialysis ([facility]) 2020    Hemodialysis patient ([facility])     M, W, F    Hyperlipidemia     Hypertension     Kidney transplant candidate     Kidney transplant recipient 10/31/2022    Presence of drug-eluting stent in right coronary artery     QT prolongation 2020    RLS (restless legs syndrome) 2016    Transaminitis 2018       Surgical History  Past Surgical History:   Procedure Laterality Date    OTHER ABDOMINAL SURGERY Right 10/31/2022     Donor kidney transplant - [facility]    ZZZ CARDIAC CATH  2016    RCA stented with 2 Synergy drug-eluting stents.    RECOVERY  2016    Procedure: CATH LAB [facility] WITH POSSIBLE DR. CASTILLO;  Surgeon: Recoveryon Surgery;  Location: SURGERY PRE-POST PROC UNIT [facility];  Service:     ZZZ CARDIAC CATH  2016    100% RCA    OTHER Left 2014    left arm upper extremity fistula    OTHER ABDOMINAL SURGERY      left kidney removed due to cancer       Family History  Family History   Problem Relation Age of Onset    Diabetes Sister     Other Sister         liver disease    Diabetes Brother     Heart Disease Neg Hx        Social History  Social History     Socioeconomic History    Marital status:      Spouse name: Not on file    Number of children: Not on file    Years of education: Not on file    Highest education level: Not on file   Occupational History    Not on file   Tobacco Use    Smoking status: Never    Smokeless tobacco: Never   Vaping Use    Vaping Use: Never used   Substance and Sexual Activity    Alcohol use: No     Alcohol/week: 0.0 oz    Drug use: No    Sexual activity: Never    Other Topics Concern    Not on file   Social History Narrative    Not on file     Social Determinants of Health     Financial Resource Strain: Not on file   Food Insecurity: Not on file   Transportation Needs: Not on file   Physical Activity: Not on file   Stress: Not on file   Social Connections: Not on file   Intimate Partner Violence: Not on file   Housing Stability: Not on file       Medications  Current Facility-Administered Medications   Medication Dose Route Frequency Provider Last Rate Last Admin    NS (BOLUS) infusion 250 mL  250 mL Intravenous DIALYSIS PRN Priyank Villar M.D.        lidocaine (XYLOCAINE) 1 % injection 1 mL  1 mL Intradermal PRN Priyank Villar M.D.        [START ON 11/9/2022] epoetin (Retacrit) injection (Dialysis Use Only) 3,000 Units  3,000 Units Intravenous MO, WE + FR Priyank Villar M.D.        heparin injection 500 Units  500 Units Intravenous PRN Priyank Villar M.D.   500 Units at 11/08/22 1525    senna-docusate (PERICOLACE or SENOKOT S) 8.6-50 MG per tablet 2 Tablet  2 Tablet Oral BID Misha Mehta M.D.        And    polyethylene glycol/lytes (MIRALAX) PACKET 1 Packet  1 Packet Oral QDAY PRN Misha Mehta M.D.        And    bisacodyl (DULCOLAX) suppository 10 mg  10 mg Rectal QDAY PRN Misha Mehta M.D.        Respiratory Therapy Consult   Nebulization Continuous RT Misha Mehta M.D.        heparin injection 5,000 Units  5,000 Units Subcutaneous Q8HRS Misha Mehta M.D.        acetaminophen (Tylenol) tablet 650 mg  650 mg Oral Q6HRS PRN Misha Mehta M.D.        insulin regular (HumuLIN R,NovoLIN R) injection  1-6 Units Subcutaneous 4X/DAY ACHS Misha Mehta M.D.        And    dextrose 10 % BOLUS 25 g  25 g Intravenous Q15 MIN PRN Misha Mehta M.D.        atorvastatin (LIPITOR) tablet 20 mg  20 mg Oral Nightly Misha Mehta M.D.        carvedilol (COREG) tablet 3.125 mg  3.125 mg Oral BID WITH MEALS Misha Mehta M.D.         fluticasone (FLONASE) nasal spray 50 mcg  1 Spray Nasal QDAY PRN Misha Mehta M.D.        mycophenolate (CELLCEPT) capsule 1,000 mg  1,000 mg Oral BID Misha Mehta M.D.        sevelamer carbonate (RENVELA) tablet 800 mg  800 mg Oral TID WITH MEALS Misha Mehta M.D.        tacrolimus (PROGRAF) capsule 1 mg  1 mg Oral BID Misha Mehta M.D.        valGANciclovir (VALCYTE) tablet 450 mg  450 mg Oral DAILY Misha Mehta M.D.        clotrimazole (Mycelex) thanh 10 mg  10 mg Oral 4X/DAY Misha Mehta M.D.   10 mg at 11/08/22 1526    [START ON 11/9/2022] levothyroxine (SYNTHROID) tablet 50 mcg  50 mcg Oral AM ES Misha Mehta M.D.        [START ON 11/9/2022] predniSONE (DELTASONE) tablet 10 mg  10 mg Oral DAILY Misha Mehta M.D.        [START ON 11/9/2022] sulfamethoxazole-trimethoprim (BACTRIM DS) 800-160 MG tablet 0.5 Tablet  0.5 Tablet Oral DAILY Misha Mehta M.D.         Current Outpatient Medications   Medication Sig Dispense Refill    predniSONE (DELTASONE) 5 MG Tab Take 2 Tablets by mouth every day.      carvedilol (COREG) 3.125 MG Tab Take 1 Tablet by mouth 2 times a day with meals.      pantoprazole (PROTONIX) 40 MG Tablet Delayed Response Take 1 Tablet by mouth every day.      atorvastatin (LIPITOR) 20 MG Tab Take 1 Tablet by mouth every evening.      clotrimazole (MYCELEX) 10 MG Thanh thanh Take 1 Thanh by mouth 4 times a day.      mycophenolate (CELLCEPT) 500 MG tablet Take 2 Tablets by mouth 2 times a day.      valGANciclovir (VALCYTE) 450 MG tablet Take 1 Tablet by mouth every day.      sulfamethoxazole-trimethoprim (BACTRIM) 400-80 MG Tab Take 1 Tablet by mouth every day.      tacrolimus (PROGRAF) 1 MG Cap Take 1 Capsule by mouth 2 times a day.      fluticasone (FLONASE) 50 MCG/ACT nasal spray Administer 1 Spray into affected nostril(S) every day. 16 g 0    sevelamer carbonate (RENVELA) 800 MG Tab tablet Take 1 Tablet by mouth 3 times a day with  meals. 270 Tablet 0    aspirin EC (ECOTRIN) 81 MG Tablet Delayed Response Take 1 Tablet by mouth every day.      Blood Glucose Meter Kit Test blood sugar as recommended by provider. Freestyle Pearl blood glucose monitoring kit. 1 Kit 0    Blood Glucose Test Strips Use one Freestyle Pearl strip to test blood sugar once daily . 100 Strip 0    Lancets Use one Freestyle Pearl lancet to test blood sugar once daily . 100 Each 0    Alcohol Swabs Wipe site with prep pad prior to injection. 100 Each 0    levothyroxine (SYNTHROID) 50 MCG Tab TOME FERNANDO TABLETA POR VIA ORAL CADA MANANA ON AN EMPTY STOMACH (Patient taking differently: Take 1 Tablet by mouth every morning on an empty stomach. TOME FERNANDO TABLETA POR VIA ORAL CADA MANANA ON AN EMPTY STOMACH  Per pharmacy pt last filled on 8/26/2022 for 90 day supply) 90 Tablet 3    apixaban (ELIQUIS) 2.5mg Tab Take 1 Tablet by mouth 2 times a day. Indications: Thromboembolism secondary to Atrial Fibrillation 180 Tablet 3       Allergies  No Known Allergies    Physical Exam  Temp:  [36.3 °C (97.3 °F)-36.8 °C (98.2 °F)] 36.3 °C (97.3 °F)  Pulse:  [] 54  Resp:  [11-53] 11  BP: (103-166)/(41-85) 117/55  SpO2:  [92 %-100 %] 100 %    Physical Exam  Constitutional:       General: She is not in acute distress.     Appearance: She is well-developed.      Interventions: Nasal cannula in place.   HENT:      Head: Normocephalic and atraumatic.      Mouth/Throat:      Mouth: Mucous membranes are moist.      Pharynx: Oropharynx is clear. No oropharyngeal exudate.   Eyes:      General: No scleral icterus.     Extraocular Movements: Extraocular movements intact.   Neck:      Trachea: No tracheal deviation.   Cardiovascular:      Rate and Rhythm: Normal rate and regular rhythm.      Heart sounds: Normal heart sounds. No murmur heard.  Pulmonary:      Effort: Pulmonary effort is normal.      Breath sounds: No stridor. Rales present.   Abdominal:      Palpations: Abdomen is soft.      Tenderness:  There is abdominal tenderness (over RLQ kidney transplant, no bruit).   Musculoskeletal:         General: Normal range of motion.      Cervical back: Normal range of motion. No rigidity.      Right lower leg: Edema present.      Left lower leg: Edema present.   Skin:     General: Skin is warm and dry.   Neurological:      General: No focal deficit present.      Mental Status: She is alert and oriented to person, place, and time.   Psychiatric:         Mood and Affect: Mood normal.         Behavior: Behavior normal.   Dialysis access: Left upper extremity AV fistula, with patent bruit and thrill      Fluids      Laboratory  Labs reviewed, pertinent labs below.  Recent Labs     11/08/22  0630   WBC 5.9   RBC 3.06*   HEMOGLOBIN 9.3*   HEMATOCRIT 28.3*   MCV 92.5   MCH 30.4   MCHC 32.9*   RDW 49.9   PLATELETCT 113*   MPV 11.3     Recent Labs     11/08/22  0630   SODIUM 130*   POTASSIUM 3.5*   CHLORIDE 89*   CO2 27   GLUCOSE 95   BUN 32*   CREATININE 3.57*   CALCIUM 9.4                URINALYSIS:  Lab Results   Component Value Date/Time    COLORURINE Yellow 11/08/2022 0755    CLARITY Cloudy (A) 11/08/2022 0755    SPECGRAVITY 1.010 11/08/2022 0755    PHURINE 7.0 11/08/2022 0755    KETONES Trace (A) 11/08/2022 0755    PROTEINURIN 100 (A) 11/08/2022 0755    BILIRUBINUR Negative 11/08/2022 0755    NITRITE Negative 11/08/2022 0755    LEUKESTERAS Trace (A) 11/08/2022 0755    OCCULTBLOOD Large (A) 11/08/2022 0755     UPC  No results found for: TOTPROTUR No results found for: CREATININEU    Imaging interpreted by radiologist. Imaging reports reviewed with pertinent findings below  US-RENAL TRANSPLANT COMP   Final Result            1. Absent diastolic flow in the renal arteries with elevated RI near 1.0, concerning for graft rejection/failure.      2. No renal vein thrombosis.      3. Complex fluid medial to the transplant kidney, measuring 7.4 x 3.4 x 0.9 cm, likely postsurgical.      CRITICAL RESULT READ BACK: Preliminary  findings discussed with and critical read back performed by Dr. Haywood in the Emergency Department via telephone on 2022 11:00 AM      DX-CHEST-PORTABLE (1 VIEW)   Final Result      1.  Interstitial and hazy perihilar opacities most likely related to pulmonary edema. Correlate clinically for infection.   2.  Cardiomegaly.            Assessment/Plan  71 y.o. female with a history of diabetes, atrial fibrillation, hypertension, ESRD on dialysis, status post  donor kidney transplant 10/31/2022 at [facility] in [place] complicated by delayed graft function requiring ongoing dialysis who presented 2022 with shortness of breath and chest pain, found to have volume overload, and concern for transplant renal artery stenosis.    1.  ESRD status post  donor kidney transplant 10/31/2022 with delayed graft function requiring dialysis.  Plan on extra dialysis today given evidence of volume overload on imaging and labs and physical exam.  The patient is producing some urine, so the kidney transplant does seem to be working somewhat, but the ultrasound report is concerning for elevated resistive indices.  Please monitor strict urine output.  Monitor daily labs.  We will plan on dialysis again tomorrow as patient has been dialyzing .  Hold anticoagulation inpatient in case patient needs transplant kidney biopsy.    2.  Dialysis access: Left arm AV fistula, patent.  Continue left arm precautions.    3.  Immunosuppression.  Continue mycophenolate 1000 mg p.o. twice daily, prednisone 10 mg p.o. daily, tacrolimus 1 mg p.o. twice daily.  Check trough tacrolimus level in the morning.  I will await recommendations from transplant nephrology before recommending any pulse steroids here locally.    4.  Infection prophylaxis.  Continue clotrimazole, valganciclovir, Bactrim.    5.  Acute hypoxic respiratory failure, due to volume overload from high liquid intake at home.  I will plan on  ultrafiltration with dialysis as tolerated.  Hypotension Limited ultrafiltration with dialysis yesterday.  Low threshold to hold or discontinue carvedilol.    6.  CKD-MBD.  Check daily phosphorus levels along with daily labs.  Continue Renvela 800 mg p.o. 3 times daily with meals for now.    [place] [facility] [place]    Priyank Villar MD  Nephrology  [facility]
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
## 2022-11-09 ENCOUNTER — APPOINTMENT (OUTPATIENT)
Dept: ENDOCRINOLOGY | Facility: MEDICAL CENTER | Age: 72
DRG: 698 | End: 2022-11-09
Attending: EMERGENCY MEDICINE
Payer: MEDICARE

## 2022-11-09 PROBLEM — Z94.0 KIDNEY TRANSPLANT RECIPIENT: Status: ACTIVE | Noted: 2022-11-09

## 2022-11-09 LAB
ALBUMIN SERPL BCP-MCNC: 3 G/DL (ref 3.2–4.9)
ALBUMIN/GLOB SERPL: 1.2 G/DL
ALP SERPL-CCNC: 58 U/L (ref 30–99)
ALT SERPL-CCNC: <5 U/L (ref 2–50)
ANION GAP SERPL CALC-SCNC: 15 MMOL/L (ref 7–16)
AST SERPL-CCNC: 16 U/L (ref 12–45)
BILIRUB SERPL-MCNC: 0.4 MG/DL (ref 0.1–1.5)
BUN SERPL-MCNC: 23 MG/DL (ref 8–22)
CALCIUM SERPL-MCNC: 8.7 MG/DL (ref 8.4–10.2)
CHLORIDE SERPL-SCNC: 96 MMOL/L (ref 96–112)
CO2 SERPL-SCNC: 25 MMOL/L (ref 20–33)
CREAT SERPL-MCNC: 2.78 MG/DL (ref 0.5–1.4)
ERYTHROCYTE [DISTWIDTH] IN BLOOD BY AUTOMATED COUNT: 52.4 FL (ref 35.9–50)
GFR SERPLBLD CREATININE-BSD FMLA CKD-EPI: 17 ML/MIN/1.73 M 2
GLOBULIN SER CALC-MCNC: 2.6 G/DL (ref 1.9–3.5)
GLUCOSE BLD STRIP.AUTO-MCNC: 106 MG/DL (ref 65–99)
GLUCOSE BLD STRIP.AUTO-MCNC: 125 MG/DL (ref 65–99)
GLUCOSE BLD STRIP.AUTO-MCNC: 144 MG/DL (ref 65–99)
GLUCOSE BLD STRIP.AUTO-MCNC: 85 MG/DL (ref 65–99)
GLUCOSE SERPL-MCNC: 77 MG/DL (ref 65–99)
HCT VFR BLD AUTO: 24.5 % (ref 37–47)
HGB BLD-MCNC: 7.8 G/DL (ref 12–16)
MAGNESIUM SERPL-MCNC: 1.8 MG/DL (ref 1.5–2.5)
MCH RBC QN AUTO: 30.6 PG (ref 27–33)
MCHC RBC AUTO-ENTMCNC: 31.8 G/DL (ref 33.6–35)
MCV RBC AUTO: 96.1 FL (ref 81.4–97.8)
PLATELET # BLD AUTO: 99 K/UL (ref 164–446)
PMV BLD AUTO: 11.5 FL (ref 9–12.9)
POTASSIUM SERPL-SCNC: 4.3 MMOL/L (ref 3.6–5.5)
PROT SERPL-MCNC: 5.6 G/DL (ref 6–8.2)
RBC # BLD AUTO: 2.55 M/UL (ref 4.2–5.4)
SODIUM SERPL-SCNC: 136 MMOL/L (ref 135–145)
WBC # BLD AUTO: 3.2 K/UL (ref 4.8–10.8)

## 2022-11-09 PROCEDURE — 700111 HCHG RX REV CODE 636 W/ 250 OVERRIDE (IP): Performed by: INTERNAL MEDICINE

## 2022-11-09 PROCEDURE — 700102 HCHG RX REV CODE 250 W/ 637 OVERRIDE(OP): Performed by: INTERNAL MEDICINE

## 2022-11-09 PROCEDURE — 770020 HCHG ROOM/CARE - TELE (206)

## 2022-11-09 PROCEDURE — A9270 NON-COVERED ITEM OR SERVICE: HCPCS | Performed by: INTERNAL MEDICINE

## 2022-11-09 PROCEDURE — 36415 COLL VENOUS BLD VENIPUNCTURE: CPT

## 2022-11-09 PROCEDURE — 90935 HEMODIALYSIS ONE EVALUATION: CPT | Performed by: INTERNAL MEDICINE

## 2022-11-09 PROCEDURE — 90935 HEMODIALYSIS ONE EVALUATION: CPT

## 2022-11-09 PROCEDURE — 99233 SBSQ HOSP IP/OBS HIGH 50: CPT | Performed by: HOSPITALIST

## 2022-11-09 PROCEDURE — 83735 ASSAY OF MAGNESIUM: CPT

## 2022-11-09 PROCEDURE — 85027 COMPLETE CBC AUTOMATED: CPT

## 2022-11-09 PROCEDURE — 82962 GLUCOSE BLOOD TEST: CPT | Mod: 91

## 2022-11-09 PROCEDURE — 700101 HCHG RX REV CODE 250: Performed by: INTERNAL MEDICINE

## 2022-11-09 PROCEDURE — 80053 COMPREHEN METABOLIC PANEL: CPT

## 2022-11-09 RX ORDER — METOPROLOL TARTRATE 1 MG/ML
5 INJECTION, SOLUTION INTRAVENOUS
Status: COMPLETED | OUTPATIENT
Start: 2022-11-09 | End: 2022-11-09

## 2022-11-09 RX ORDER — DILTIAZEM HYDROCHLORIDE 5 MG/ML
10 INJECTION INTRAVENOUS EVERY 4 HOURS PRN
Status: DISCONTINUED | OUTPATIENT
Start: 2022-11-09 | End: 2022-11-10 | Stop reason: HOSPADM

## 2022-11-09 RX ORDER — VALGANCICLOVIR 450 MG/1
450 TABLET, FILM COATED ORAL
Status: SHIPPED
Start: 2022-11-14 | End: 2022-11-10 | Stop reason: SDUPTHER

## 2022-11-09 RX ORDER — OXYCODONE HYDROCHLORIDE 5 MG/1
5 TABLET ORAL EVERY 8 HOURS PRN
Qty: 9 TABLET | Refills: 0 | Status: SHIPPED | OUTPATIENT
Start: 2022-11-09 | End: 2022-11-12

## 2022-11-09 RX ADMIN — CLOTRIMAZOLE 10 MG: 10 LOZENGE ORAL; TOPICAL at 18:06

## 2022-11-09 RX ADMIN — EPOETIN ALFA-EPBX 3000 UNITS: 3000 INJECTION, SOLUTION INTRAVENOUS; SUBCUTANEOUS at 13:51

## 2022-11-09 RX ADMIN — MYCOPHENOLATE MOFETIL 1000 MG: 250 CAPSULE ORAL at 17:55

## 2022-11-09 RX ADMIN — SEVELAMER CARBONATE 800 MG: 800 TABLET, FILM COATED ORAL at 11:57

## 2022-11-09 RX ADMIN — CLOTRIMAZOLE 10 MG: 10 LOZENGE ORAL; TOPICAL at 08:34

## 2022-11-09 RX ADMIN — TACROLIMUS 1 MG: 1 CAPSULE ORAL at 05:24

## 2022-11-09 RX ADMIN — LEVOTHYROXINE SODIUM 50 MCG: 50 TABLET ORAL at 05:24

## 2022-11-09 RX ADMIN — METOPROLOL TARTRATE 5 MG: 1 INJECTION, SOLUTION INTRAVENOUS at 04:31

## 2022-11-09 RX ADMIN — PREDNISONE 10 MG: 10 TABLET ORAL at 05:24

## 2022-11-09 RX ADMIN — METOPROLOL TARTRATE 25 MG: 25 TABLET, FILM COATED ORAL at 05:24

## 2022-11-09 RX ADMIN — SEVELAMER CARBONATE 800 MG: 800 TABLET, FILM COATED ORAL at 17:56

## 2022-11-09 RX ADMIN — SENNOSIDES AND DOCUSATE SODIUM 2 TABLET: 50; 8.6 TABLET ORAL at 17:56

## 2022-11-09 RX ADMIN — VALGANCICLOVIR 450 MG: 450 TABLET, FILM COATED ORAL at 05:24

## 2022-11-09 RX ADMIN — METOPROLOL TARTRATE 5 MG: 1 INJECTION, SOLUTION INTRAVENOUS at 03:14

## 2022-11-09 RX ADMIN — SULFAMETHOXAZOLE AND TRIMETHOPRIM 0.5 TABLET: 800; 160 TABLET ORAL at 05:24

## 2022-11-09 RX ADMIN — HEPARIN SODIUM 500 UNITS: 1000 INJECTION, SOLUTION INTRAVENOUS; SUBCUTANEOUS at 11:30

## 2022-11-09 RX ADMIN — CLOTRIMAZOLE 10 MG: 10 LOZENGE ORAL; TOPICAL at 11:57

## 2022-11-09 RX ADMIN — MYCOPHENOLATE MOFETIL 1000 MG: 250 CAPSULE ORAL at 05:24

## 2022-11-09 RX ADMIN — SEVELAMER CARBONATE 800 MG: 800 TABLET, FILM COATED ORAL at 08:34

## 2022-11-09 RX ADMIN — HEPARIN SODIUM 5000 UNITS: 5000 INJECTION, SOLUTION INTRAVENOUS; SUBCUTANEOUS at 21:14

## 2022-11-09 RX ADMIN — METOPROLOL TARTRATE 25 MG: 25 TABLET, FILM COATED ORAL at 17:55

## 2022-11-09 RX ADMIN — METOPROLOL TARTRATE 5 MG: 1 INJECTION, SOLUTION INTRAVENOUS at 03:49

## 2022-11-09 RX ADMIN — TACROLIMUS 1 MG: 1 CAPSULE ORAL at 17:56

## 2022-11-09 RX ADMIN — CLOTRIMAZOLE 10 MG: 10 LOZENGE ORAL; TOPICAL at 21:14

## 2022-11-09 RX ADMIN — ATORVASTATIN CALCIUM 20 MG: 20 TABLET, FILM COATED ORAL at 21:14

## 2022-11-09 RX ADMIN — HEPARIN SODIUM 5000 UNITS: 5000 INJECTION, SOLUTION INTRAVENOUS; SUBCUTANEOUS at 13:22

## 2022-11-09 RX ADMIN — DILTIAZEM HYDROCHLORIDE 10 MG: 5 INJECTION INTRAVENOUS at 06:08

## 2022-11-09 ASSESSMENT — PAIN DESCRIPTION - PAIN TYPE
TYPE: ACUTE PAIN
TYPE: ACUTE PAIN

## 2022-11-09 ASSESSMENT — COGNITIVE AND FUNCTIONAL STATUS - GENERAL
TOILETING: A LITTLE
SUGGESTED CMS G CODE MODIFIER MOBILITY: CK
DRESSING REGULAR LOWER BODY CLOTHING: A LITTLE
HELP NEEDED FOR BATHING: A LITTLE
PERSONAL GROOMING: A LITTLE
MOVING FROM LYING ON BACK TO SITTING ON SIDE OF FLAT BED: A LITTLE
DAILY ACTIVITIY SCORE: 19
TURNING FROM BACK TO SIDE WHILE IN FLAT BAD: A LITTLE
DRESSING REGULAR UPPER BODY CLOTHING: A LITTLE
MOBILITY SCORE: 18
MOVING TO AND FROM BED TO CHAIR: A LITTLE
WALKING IN HOSPITAL ROOM: A LITTLE
SUGGESTED CMS G CODE MODIFIER DAILY ACTIVITY: CK
CLIMB 3 TO 5 STEPS WITH RAILING: A LITTLE
STANDING UP FROM CHAIR USING ARMS: A LITTLE

## 2022-11-09 ASSESSMENT — ENCOUNTER SYMPTOMS
VOMITING: 0
FEVER: 0
COUGH: 0
ABDOMINAL PAIN: 0
HEADACHES: 0
SHORTNESS OF BREATH: 1
DIZZINESS: 0
CHILLS: 0
PALPITATIONS: 0
NAUSEA: 0

## 2022-11-09 ASSESSMENT — FIBROSIS 4 INDEX: FIB4 SCORE: 5.49

## 2022-11-09 NOTE — HOSPITAL COURSE
71-year-old female with past medical history of diabetes mellitus, A. fib on Xarelto, ESRD on HD s/p  donor kidney transplant on 10/31/2022 in [place] complicated by delayed graft function requiring ongoing dialysis presenting with chest pain and shortness of breath.  Nephrology was consulted and she was continued on her dialysis while in ER given concerns of volume overload.  Ultrasound of renal transplant showed absent diastolic flow concerning for graft rejection/failure.

## 2022-11-09 NOTE — PROGRESS NOTES
Telemetry Shift Summary     Rhythm: SB/SR/Afib @0235  Rate:  (touched up to 208 while in afib)  Measurements: --/0.08/--  Ectopy (reported by Monitor Tech): o-f PAC, r PVC, r Triplets, 5 beats VT     Normal Values  Rhythm: Sinus  HR:   Measurements: 0.12-0.20/0.06-0.10/0.30-0.52

## 2022-11-09 NOTE — FACE TO FACE
Face to Face Supporting Documentation - Home Health    The encounter with this patient was in whole or in part the primary reason for home health admission.    Date of encounter:   Patient:                    MRN:                       YOB: 2023  Tere Gallegos  [id number]  1950     Home health to see patient for:  Skilled Nursing care for assessment, interventions & education, Physical Therapy evaluation and treatment, and Occupational therapy evaluation and treatment    Skilled need for:  Surgical Aftercare renal transplant    Skilled nursing interventions to include:  Comment: as above    Homebound status evidenced by:  Needs the assistance of another person in order to leave the home. Leaving home requires a considerable and taxing effort. There is a normal inability to leave the home.    Community Physician to provide follow up care: ANGELITA Concepcion     Optional Interventions? No      I certify the face to face encounter for this home health care referral meets the CMS requirements and the encounter/clinical assessment with the patient was, in whole, or in part, for the medical condition(s) listed above, which is the primary reason for home health care. Based on my clinical findings: the service(s) are medically necessary, support the need for home health care, and the homebound criteria are met.  I certify that this patient has had a face to face encounter by myself.  Samuel Eng M.D. - NPI: [id number]

## 2022-11-09 NOTE — PROGRESS NOTES
Hospital Medicine Daily Progress Note    Date of Service  11/10/2022    Chief Complaint  Tere Gallegos is a 71 y.o. female admitted 2022 with SOB    Hospital Course  71-year-old female with past medical history of diabetes mellitus, A. fib on Xarelto, ESRD on HD s/p  donor kidney transplant on 10/31/2022 in [place] complicated by delayed graft function requiring ongoing dialysis presenting with chest pain and shortness of breath.  Nephrology was consulted and she was continued on her dialysis while in ER given concerns of volume overload.  Ultrasound of renal transplant showed absent diastolic flow concerning for graft rejection/failure.     Interval Problem Update  Received additional dialysis yesterday and will be getting her routine dialysis today  Her shortness of breath is improved after dialysis  Continues to have RLQ pain at surgical site  I discussed with transplant nephrologist who stated that ultrasound findings are consistent with delayed graft function and patient does not require transfer.  I discussed with [facility] nephrologist and agreeable for patient to discharge after dialysis today      I have discussed this patient's plan of care and discharge plan at IDT rounds today with Case Management, Nursing, Nursing leadership, and other members of the IDT team.    Consultants/Specialty  nephrology    Code Status  Full Code    Disposition  Patient is medically cleared for discharge.   Anticipate discharge to to home with close outpatient follow-up.  I have placed the appropriate orders for post-discharge needs.    Review of Systems  Review of Systems   Constitutional:  Negative for chills and fever.   Respiratory:  Positive for shortness of breath. Negative for cough.    Cardiovascular:  Negative for chest pain and palpitations.   Gastrointestinal:  Negative for abdominal pain, nausea and vomiting.   Genitourinary:  Negative for dysuria and urgency.   Neurological:  Negative  for dizziness and headaches.   All other systems reviewed and are negative.     Physical Exam  Temp:  [36.4 °C (97.5 °F)-37.4 °C (99.4 °F)] 36.5 °C (97.7 °F)  Pulse:  [52-70] 70  Resp:  [16-18] 18  BP: (130-180)/(35-94) 166/35  SpO2:  [90 %-99 %] 92 %    Physical Exam  Vitals and nursing note reviewed.   Constitutional:       Appearance: Normal appearance.   Cardiovascular:      Rate and Rhythm: Normal rate and regular rhythm.   Pulmonary:      Effort: Pulmonary effort is normal.      Breath sounds: Normal breath sounds.   Skin:     General: Skin is warm and dry.   Neurological:      General: No focal deficit present.      Mental Status: She is alert and oriented to person, place, and time.       Fluids    Intake/Output Summary (Last 24 hours) at 11/10/2022 0831  Last data filed at 11/10/2022 0223  Gross per 24 hour   Intake 860 ml   Output 3700 ml   Net -2840 ml       Laboratory  Recent Labs     11/08/22 0630 11/09/22 0222   WBC 5.9  5.9 3.2*   RBC 3.06* 2.55*   HEMOGLOBIN 9.3*  9.3* 7.8*   HEMATOCRIT 28.3*  28.3* 24.5*   MCV 92.5  92.5 96.1   MCH 30.4  30.4 30.6   MCHC 32.9  32.9* 31.8*   RDW 49.9 52.4*   PLATELETCT 113*  113* 99*   MPV 11.3 11.5     Recent Labs     11/08/22 0630 11/09/22 0222   SODIUM 130* 136   POTASSIUM 3.5* 4.3   CHLORIDE 89* 96   CO2 27 25   GLUCOSE 95 77   BUN 32* 23*   CREATININE 3.57* 2.78*   CALCIUM 9.4 8.7                   Imaging  US-RENAL TRANSPLANT COMP   Final Result            1. Absent diastolic flow in the renal arteries with elevated RI near 1.0, concerning for graft rejection/failure.      2. No renal vein thrombosis.      3. Complex fluid medial to the transplant kidney, measuring 7.4 x 3.4 x 0.9 cm, likely postsurgical.      CRITICAL RESULT READ BACK: Preliminary findings discussed with and critical read back performed by Dr. Haywood in the Emergency Department via telephone on 11/8/2022 11:00 AM      DX-CHEST-PORTABLE (1 VIEW)   Final Result      1.  Interstitial  and hazy perihilar opacities most likely related to pulmonary edema. Correlate clinically for infection.   2.  Cardiomegaly.           Assessment/Plan  * Acute renal transplant rejection- (present on admission)  Assessment & Plan  High concern given right flank pain, labs showing Acute renal transplant failure (elevated creatinine, microhematuria on UA, BNP greater than 35,000, physical exam with evidence of pulmonary edema).  Renal ultrasound showing lack of diastolic flow in the renal arteries concerning for graft rejection  - Continue patient's medications CellCept, prednisone, tacrolimus.  Hold high-dose steroids.  -Continue prophylaxis medication Bactrim and valganciclovir.    -Continue phosphate binder sevelamer.  Upon discussion with transplant nephrologist, ultrasound findings are consistent with DGI.  No need for transfer    Thrombocytopenia (HCC)- (present on admission)  Assessment & Plan  Chronic  -113 on admission  - Monitor    Secondary hypercoagulable state (HCC)- (present on admission)  Assessment & Plan  Patient is on chronic Eliquis  Will need to hold as patient may need procedures possibly renal biopsy    Anemia, chronic disease- (present on admission)  Assessment & Plan  Hemoglobin 9.3, hematocrit 28.3  EPO as per nephrology  Monitor CBC    Paroxysmal A-fib (HCC)- (present on admission)  Assessment & Plan  Noted on EKG, A. fib with RVR  Likely provoked due to volume overload  Continue patient's home carvedilol    Pancytopenia ([facility])- (present on admission)  Assessment & Plan  D/t immunosuppressive therapy    Acquired hypothyroidism- (present on admission)  Assessment & Plan  Continue home levothyroxine    ESRD (end stage renal disease) (HCC)- (present on admission)  Assessment & Plan  Patient had renal transplant unfortunately appears to be having rejection  Nephrology consulted, starting dialysis  Left arm AV fistula    Chronic diastolic heart failure (HCC)- (present on admission)  Assessment &  Plan  Last echo 08/2022, diastolic stage I noted  Patient will receive dialysis  Holding Lasix due to nephrotoxicity    Controlled type 2 diabetes mellitus with chronic kidney disease on chronic dialysis, without long-term current use of insulin (HCC)- (present on admission)  Assessment & Plan  Patient will be on insulin sliding scale, Accu-Cheks, hypoglycemic protocol  Renal/diabetic diet    Renovascular hypertension- (present on admission)  Assessment & Plan  Continue patient's carvedilol  Hold any ACE inhibitor/ARB's       VTE prophylaxis: heparin ppx    I have performed a physical exam and reviewed and updated ROS and Plan today (11/10/2022). In review of yesterday's note (11/9/2022), there are no changes except as documented above.

## 2022-11-09 NOTE — ED NOTES
Report to [facility] @ ext [phone]  Pt to be transferred to room 311-2    Dialysis is still in process and has approximately 30 more minutes.

## 2022-11-09 NOTE — DISCHARGE PLANNING
Originally planned for transfer to [facility] but because of bad weather then pt is being admitted.     CM met with pt but she is undergoing HD . Son was at bedside so CM talked to him. Son at bedside is Ronny. Pt lives with son Manoj. Pt is independently with ADLs and does not use any assistive devices. Otherwise pt needs assistance with all IADLs. Per Ronny, pt never had any home health services in the past but may need it this time.     PCP is SAMMI Melgar.      Care Transition Team Assessment    Information Source  Orientation Level: Unable to assess, Other (Comment) (pt was undergoing HD)  Information Given By: Other (Comments) (son Ronny)  Informant's Name: Ronny  Who is responsible for making decisions for patient? : Patient    Readmission Evaluation  Is this a readmission?: No    Elopement Risk  Legal Hold: No  Ambulatory or Self Mobile in Wheelchair: No-Not an Elopement Risk    Interdisciplinary Discharge Planning  Does Admitting Nurse Feel This Could be a Complex Discharge?: No  Primary Care Physician: Kathy CHAPA  Lives with - Patient's Self Care Capacity: Adult Children, Spouse  Support Systems: Children, Family Member(s), Spouse / Significant Other  Housing / Facility: [address] [place] [address]  Do You Take your Prescribed Medications Regularly: Yes  Able to Return to Previous ADL's: Yes  Mobility Issues: No  Prior Services: None, Home-Independent  Patient Prefers to be Discharged to:: Home  Assistance Needed: Yes  Durable Medical Equipment: Not Applicable    Discharge Preparedness  What is your plan after discharge?: Home with help  What are your discharge supports?: Child, Spouse  Prior Functional Level: Ambulatory, Needs Assist with Activities of Daily Living, Needs Assist with Medication Management  Difficulity with ADLs: Walking, Toileting, Bathing  Difficulity with IADLs: Cooking, Driving, Laundry, Shopping    Functional Assesment  Prior Functional Level: Ambulatory, Needs  Assist with Activities of Daily Living, Needs Assist with Medication Management    Finances  Financial Barriers to Discharge: No  Prescription Coverage: Yes    Values / Beliefs / Concerns  Values / Beliefs Concerns : No    Advance Directive  Advance Directive?: None    Domestic Abuse  Have you ever been the victim of abuse or violence?: No    Discharge Risks or Barriers  Discharge risks or barriers?: Post-acute placement / services  Patient risk factors: Cognitive / sensory / physical deficit, Language barrier, Vulnerable adult    Anticipated Discharge Information  Discharge Disposition: Discharged to home/self care (01)

## 2022-11-09 NOTE — PROGRESS NOTES
[facility] Progress Note     Hemodialysis treatment ordered today per Dr. Villar x3  hours.   Treatment initiated at 1349 ended at 1649.      Patient tolerated tx; see e flow sheet for details.      Net UF 3000 mL.      Needles removed from access site. Dressings applied and sites held x 10 minutes; verified no bleeding. Positive bruit/thrill post tx.   Staff RN to monitor AVF/G for breakthrough bleeding. Should breakthrough bleeding occur, staff RN to apply pressure to access sites until bleeding resolved.   Notify Dialysis and Nephrologist for follow-up.     Report given to Primary RN Homero

## 2022-11-09 NOTE — PROGRESS NOTES
Pt arrives to unit from ER via [facility]. Ambulated from [facility] to bed, accompanied by ED staff, son, and daughter. PT A&Ox4. Tele monitor applied, vitals taken. History, allergies and med rec reviewed and admission profile completed.     Pt oriented to unit and POC discussed, including transfer to [place] when weather is cleared to fly. Welcome folder provided and discussed. Communication board filled out. Pt instructed to call light usage and encouraged to call for any questions, needs, or concerns and prior to getting out of bed. Fall precautions in place. Pt agrees with the plan and declines any needs at this time. Bed locked and low and bed alarm on.

## 2022-11-09 NOTE — CARE PLAN
The patient is Watcher - Medium risk of patient condition declining or worsening    Shift Goals  Clinical Goals: Transfer pt, pain control  Patient Goals: rest, comfort. pain management    Progress made toward(s) clinical / shift goals:    Problem: Pain - Standard  Goal: Alleviation of pain or a reduction in pain to the patient’s comfort goal  Outcome: Progressing     Problem: Knowledge Deficit - Standard  Goal: Patient and family/care givers will demonstrate understanding of plan of care, disease process/condition, diagnostic tests and medications  Outcome: Progressing       Patient is not progressing towards the following goals:

## 2022-11-09 NOTE — PROGRESS NOTES
4 Eyes Skin Assessment Completed by [place] RN and TRAVIS Gonzalez.    Head WDL  Ears WDL  Nose WDL  Mouth WDL  Neck WDL  Breast/Chest WDL  Shoulder Blades WDL  Spine WDL  (R) Arm/Elbow/Hand Bruising  (L) Arm/Elbow/Hand Dialysis fistula present   Abdomen Surgical incision to RLQ   Groin WDL  Scrotum/Coccyx/Buttocks WDL  (R) Leg WDL  (L) Leg WDL  (R) Heel/Foot/Toe WDL  (L) Heel/Foot/Toe WDL          Devices In Places Tele Box and Nasal Cannula      Interventions In Place Pressure Redistribution Mattress    Possible Skin Injury No    Pictures Uploaded Into Epic Yes  Wound Consult Placed N/A  RN Wound Prevention Protocol Ordered Yes

## 2022-11-09 NOTE — DISCHARGE PLANNING
Outpatient Dialysis Note    Confirmed patient is active at:    [facility]    [address]  [place] [address]    Schedule: Monday, Wednesday, Friday   Time: 05:30am     Patient is seen by Dr. Villar in HD clinic.    Spoke with Lori at facility who confirmed.    Forwarded records for review.    Vicki Rodriguez- Senior Dialysis Coordinator Ph# [phone]  [facility]

## 2022-11-09 NOTE — DISCHARGE PLANNING
"Agency/Facility Name: [facility]  Spoke To: Renee  Outcome: FARZAD asked DPA to natalie Pt as Pending, as they are a Pt with HH.    RN CM notified.     1325-  Agency/Facility Name: [facility]   Outcome: Estee left v-mail informing DPA that Pt is already in service with [facility], so  will fax referral to [facility] and natalie the Pt as \"no admit\".     RN CM notified.     1554-  Agency/Facility Name: [facility]  Outcome: Dahlia left v-mail informing DPA that the Pt's referral was faxed by [facility]. Dahlia confirmed that the Pt is in service with [facility].     RN CM notified.   "

## 2022-11-09 NOTE — PROGRESS NOTES
[facility] Progress Note         HD today x 3 hours per Dr. Villar. Tx initiated at 1130 and ended at 1430    NET UF: 3000 ml    Patient tolerated tx. UFG met. No HD complications noted. HR at 40's to 60's throughout the treatment. S/E by Dr. Villar. All blood was returned aseptically. HD needles removed from LUE AVF. Dry gauze applied and changed without bleeding issue.(+) Bruit and thrill pre/post tx.See eflow sheets for further details.    Report given to J CARLOS Niño RN

## 2022-11-10 VITALS
RESPIRATION RATE: 18 BRPM | HEIGHT: 60 IN | OXYGEN SATURATION: 92 % | BODY MASS INDEX: 27.57 KG/M2 | WEIGHT: 140.43 LBS | TEMPERATURE: 97.7 F | HEART RATE: 70 BPM | SYSTOLIC BLOOD PRESSURE: 166 MMHG | DIASTOLIC BLOOD PRESSURE: 35 MMHG

## 2022-11-10 LAB
GLUCOSE BLD STRIP.AUTO-MCNC: 181 MG/DL (ref 65–99)
GLUCOSE BLD STRIP.AUTO-MCNC: 95 MG/DL (ref 65–99)
TACROLIMUS BLD-MCNC: 3.5 NG/ML

## 2022-11-10 PROCEDURE — 700102 HCHG RX REV CODE 250 W/ 637 OVERRIDE(OP): Performed by: INTERNAL MEDICINE

## 2022-11-10 PROCEDURE — 94760 N-INVAS EAR/PLS OXIMETRY 1: CPT

## 2022-11-10 PROCEDURE — 99239 HOSP IP/OBS DSCHRG MGMT >30: CPT | Performed by: HOSPITALIST

## 2022-11-10 PROCEDURE — A9270 NON-COVERED ITEM OR SERVICE: HCPCS | Performed by: INTERNAL MEDICINE

## 2022-11-10 PROCEDURE — 700111 HCHG RX REV CODE 636 W/ 250 OVERRIDE (IP): Performed by: INTERNAL MEDICINE

## 2022-11-10 PROCEDURE — 99233 SBSQ HOSP IP/OBS HIGH 50: CPT | Performed by: INTERNAL MEDICINE

## 2022-11-10 PROCEDURE — 82962 GLUCOSE BLOOD TEST: CPT | Mod: 91

## 2022-11-10 RX ORDER — VALGANCICLOVIR 450 MG/1
450 TABLET, FILM COATED ORAL
Status: ON HOLD
Start: 2022-11-16 | End: 2022-11-29 | Stop reason: SDUPTHER

## 2022-11-10 RX ADMIN — SENNOSIDES AND DOCUSATE SODIUM 2 TABLET: 50; 8.6 TABLET ORAL at 06:36

## 2022-11-10 RX ADMIN — VALGANCICLOVIR 450 MG: 450 TABLET, FILM COATED ORAL at 06:31

## 2022-11-10 RX ADMIN — MYCOPHENOLATE MOFETIL 1000 MG: 250 CAPSULE ORAL at 06:29

## 2022-11-10 RX ADMIN — SULFAMETHOXAZOLE AND TRIMETHOPRIM 0.5 TABLET: 800; 160 TABLET ORAL at 06:30

## 2022-11-10 RX ADMIN — TACROLIMUS 1 MG: 1 CAPSULE ORAL at 06:31

## 2022-11-10 RX ADMIN — LEVOTHYROXINE SODIUM 50 MCG: 50 TABLET ORAL at 06:29

## 2022-11-10 RX ADMIN — METOPROLOL TARTRATE 25 MG: 25 TABLET, FILM COATED ORAL at 06:30

## 2022-11-10 RX ADMIN — HEPARIN SODIUM 5000 UNITS: 5000 INJECTION, SOLUTION INTRAVENOUS; SUBCUTANEOUS at 06:31

## 2022-11-10 RX ADMIN — ACETAMINOPHEN 650 MG: 325 TABLET, FILM COATED ORAL at 08:06

## 2022-11-10 RX ADMIN — CLOTRIMAZOLE 10 MG: 10 LOZENGE ORAL; TOPICAL at 07:59

## 2022-11-10 RX ADMIN — PREDNISONE 10 MG: 10 TABLET ORAL at 06:41

## 2022-11-10 RX ADMIN — INSULIN HUMAN 1 UNITS: 100 INJECTION, SOLUTION PARENTERAL at 11:48

## 2022-11-10 RX ADMIN — SEVELAMER CARBONATE 800 MG: 800 TABLET, FILM COATED ORAL at 11:50

## 2022-11-10 RX ADMIN — SEVELAMER CARBONATE 800 MG: 800 TABLET, FILM COATED ORAL at 07:59

## 2022-11-10 ASSESSMENT — FIBROSIS 4 INDEX
FIB4 SCORE: 5.49
FIB4 SCORE: 5.49

## 2022-11-10 ASSESSMENT — ENCOUNTER SYMPTOMS
ABDOMINAL PAIN: 0
FEVER: 0
SHORTNESS OF BREATH: 0

## 2022-11-10 ASSESSMENT — PAIN DESCRIPTION - PAIN TYPE: TYPE: ACUTE PAIN

## 2022-11-10 NOTE — DISCHARGE PLANNING
Case Management Discharge Planning    Admission Date: 11/8/2022  GMLOS: 4.8  ALOS: 2    6-Clicks ADL Score: 19  6-Clicks Mobility Score: 18      Anticipated Discharge Dispo: Discharge Disposition: Discharged to home/self care (01)    DME Needed: No    Action(s) Taken: RNCM was notified to provide list of care givers to family. List provided. RNCM was notified that patient/family was refusing to discharge and was requesting SNF. RNCHYNA Molina spoke with family as they are [place]ican speaking. Family was concerned over medication management, diabetes management. Family as asking for a [place]ican speaking nurse as well as additional visits per week. RNCM reached out to Dahlia at [facility]. Dahlia did state there is a [place]ican speaking nurse that visits the family and visits are based on acuity of patient and what medicare allows for.     Escalations Completed: None    Medically Clear: Yes    Next Steps: Family was willing to discharge home. RNCM will be available for any additional needs.     Barriers to Discharge: None    Is the patient up for discharge tomorrow: No

## 2022-11-10 NOTE — CARE PLAN
The patient is Stable - Low risk of patient condition declining or worsening    Shift Goals  Clinical Goals: Discharge  Patient Goals: Lab draw improvement  Family Goals: Talk with doctor    Progress made toward(s) clinical / shift goals:    Problem: Communication  Goal: The ability to communicate needs accurately and effectively will improve  Outcome: Progressing  Flowsheets (Taken 11/10/2022 0907)  Communication:   Assessed patient's ability to understand and communicate   Oriented patient to call light   Oriented family/support system to call light   Utilized /language line     Problem: Infection - Standard  Goal: Patient will remain free from infection  Outcome: Progressing  Flowsheets (Taken 11/10/2022 0907)  Standard Infection Interventions:   Assessed for signs and symptoms of infection   Implemented standard precautions   Instructed patient/family on signs and symptoms of infection   Provided education on proper hand hygiene and infection prevention measures   Assessed for removal IV, central lines, intra-arterial or urinary catheters     Problem: Wound/ / Incision Healing  Goal: Patient's wound/surgical incision will decrease in size and heals properly  Outcome: Progressing       Patient is not progressing towards the following goals:

## 2022-11-10 NOTE — PROGRESS NOTES
Nephrology Daily Progress Note    Date of Service  11/10/2022    Chief Complaint  71 y.o. female with a history of diabetes, atrial fibrillation, hypertension, ESRD on dialysis, status post  donor kidney transplant 10/31/2022 at [facility] in [place] complicated by delayed graft function requiring ongoing dialysis who presented 2022 with shortness of breath and chest pain, found to have volume overload, and concern for transplant renal artery stenosis.    Interval Problem Update  11/10 - feeling much better after 3 days in a row of HD. Patient denies chest pain, SOB.     Review of Systems  Review of Systems   Constitutional:  Negative for fever.   Respiratory:  Negative for shortness of breath.    Cardiovascular:  Negative for chest pain.   Gastrointestinal:  Negative for abdominal pain.   All other systems reviewed and are negative.     Physical Exam  Temp:  [36.5 °C (97.7 °F)-37.4 °C (99.4 °F)] 36.5 °C (97.7 °F)  Pulse:  [52-70] 70  Resp:  [16-18] 18  BP: (130-180)/(35-94) 166/35  SpO2:  [90 %-99 %] 92 %    Physical Exam  Constitutional:       General: She is not in acute distress.     Appearance: She is well-developed.   HENT:      Head: Normocephalic and atraumatic.      Mouth/Throat:      Mouth: Mucous membranes are moist.      Pharynx: Oropharynx is clear. No oropharyngeal exudate.   Eyes:      General: No scleral icterus.     Extraocular Movements: Extraocular movements intact.   Neck:      Trachea: No tracheal deviation.   Cardiovascular:      Rate and Rhythm: Normal rate and regular rhythm.      Heart sounds: Normal heart sounds. No murmur heard.  Pulmonary:      Effort: Pulmonary effort is normal.      Breath sounds: No stridor. No rales.   Abdominal:      Palpations: Abdomen is soft.      Tenderness: There is no abdominal tenderness.   Musculoskeletal:         General: Normal range of motion.      Cervical back: Normal range of motion. No rigidity.      Right lower leg: No edema.       Left lower leg: No edema.   Skin:     General: Skin is warm and dry.   Neurological:      General: No focal deficit present.      Mental Status: She is alert and oriented to person, place, and time.   Psychiatric:         Mood and Affect: Mood normal.         Behavior: Behavior normal.   HD Access: left BC AVF, patent bruit and thrill      Fluids    Intake/Output Summary (Last 24 hours) at 11/10/2022 1534  Last data filed at 11/10/2022 0800  Gross per 24 hour   Intake 360 ml   Output 400 ml   Net -40 ml       Laboratory  Labs reviewed, pertinent labs below.  Recent Labs     11/08/22 0630 11/09/22 0222   WBC 5.9  5.9 3.2*   RBC 3.06* 2.55*   HEMOGLOBIN 9.3*  9.3* 7.8*   HEMATOCRIT 28.3*  28.3* 24.5*   MCV 92.5  92.5 96.1   MCH 30.4  30.4 30.6   MCHC 32.9  32.9* 31.8*   RDW 49.9 52.4*   PLATELETCT 113*  113* 99*   MPV 11.3 11.5     Recent Labs     11/08/22 0630 11/09/22 0222   SODIUM 130* 136   POTASSIUM 3.5* 4.3   CHLORIDE 89* 96   CO2 27 25   GLUCOSE 95 77   BUN 32* 23*   CREATININE 3.57* 2.78*   CALCIUM 9.4 8.7               URINALYSIS:  Lab Results   Component Value Date/Time    COLORURINE Yellow 11/08/2022 0755    CLARITY Cloudy (A) 11/08/2022 0755    SPECGRAVITY 1.010 11/08/2022 0755    PHURINE 7.0 11/08/2022 0755    KETONES Trace (A) 11/08/2022 0755    PROTEINURIN 100 (A) 11/08/2022 0755    BILIRUBINUR Negative 11/08/2022 0755    NITRITE Negative 11/08/2022 0755    LEUKESTERAS Trace (A) 11/08/2022 0755    OCCULTBLOOD Large (A) 11/08/2022 0755     UPC  No results found for: TOTPROTUR No results found for: CREATININEU      Imaging interpreted by radiologist. Imaging reports reviewed with pertinent findings below  US-RENAL TRANSPLANT COMP   Final Result            1. Absent diastolic flow in the renal arteries with elevated RI near 1.0, concerning for graft rejection/failure.      2. No renal vein thrombosis.      3. Complex fluid medial to the transplant kidney, measuring 7.4 x 3.4 x 0.9 cm, likely  postsurgical.      CRITICAL RESULT READ BACK: Preliminary findings discussed with and critical read back performed by Dr. Haywood in the Emergency Department via telephone on 11/8/2022 11:00 AM      DX-CHEST-PORTABLE (1 VIEW)   Final Result      1.  Interstitial and hazy perihilar opacities most likely related to pulmonary edema. Correlate clinically for infection.   2.  Cardiomegaly.            Current Facility-Administered Medications   Medication Dose Route Frequency Provider Last Rate Last Admin    metoprolol tartrate (LOPRESSOR) tablet 25 mg  25 mg Oral TWICE DAILY DANITZA GonzalezOOctaviano   25 mg at 11/10/22 0630    dilTIAZem (Cardizem) injection 10 mg  10 mg Intravenous Q4HRS PRN Jesus Alberto Cuba D.O.   10 mg at 11/09/22 0608    NS (BOLUS) infusion 250 mL  250 mL Intravenous DIALYSIS PRN Priyank Villar M.D.        lidocaine (XYLOCAINE) 1 % injection 1 mL  1 mL Intradermal PRN Priyank Villar M.D.        epoetin (Retacrit) injection (Dialysis Use Only) 3,000 Units  3,000 Units Intravenous MO, WE + FR Priyank Villar M.D.   3,000 Units at 11/09/22 1351    heparin injection 500 Units  500 Units Intravenous PRN Priyank Villar M.D.   500 Units at 11/09/22 1130    senna-docusate (PERICOLACE or SENOKOT S) 8.6-50 MG per tablet 2 Tablet  2 Tablet Oral BID Misha Mehta M.D.   2 Tablet at 11/10/22 0636    And    polyethylene glycol/lytes (MIRALAX) PACKET 1 Packet  1 Packet Oral QDAY PRN Misha Mehta M.D.        And    bisacodyl (DULCOLAX) suppository 10 mg  10 mg Rectal QDAY PRN Misha Mehta M.D.        Respiratory Therapy Consult   Nebulization Continuous RT Misha Mehta M.D.        heparin injection 5,000 Units  5,000 Units Subcutaneous Q8HRS Misha Mehta M.D.   5,000 Units at 11/10/22 0631    acetaminophen (Tylenol) tablet 650 mg  650 mg Oral Q6HRS PRN Misha Mehta M.D.   650 mg at 11/10/22 0806    insulin regular (HumuLIN R,NovoLIN R) injection  1-6 Units Subcutaneous 4X/DAY KENNY Cabral  SHERRIE Mehta M.D.   1 Units at 11/10/22 1148    And    dextrose 10 % BOLUS 25 g  25 g Intravenous Q15 MIN PRN Misha Mehta M.D.        atorvastatin (LIPITOR) tablet 20 mg  20 mg Oral Nightly Misha Mehta M.D.   20 mg at 11/09/22 2114    fluticasone (FLONASE) nasal spray 50 mcg  1 Spray Nasal QDAY PRN Misha Mehta M.D.        mycophenolate (CELLCEPT) capsule 1,000 mg  1,000 mg Oral BID Misha Mehta M.D.   1,000 mg at 11/10/22 0629    sevelamer carbonate (RENVELA) tablet 800 mg  800 mg Oral TID WITH MEALS Misha Mehta M.D.   800 mg at 11/10/22 1150    tacrolimus (PROGRAF) capsule 1 mg  1 mg Oral BID Misha Mehta M.D.   1 mg at 11/10/22 0631    valGANciclovir (VALCYTE) tablet 450 mg  450 mg Oral DAILY Misha Mehta M.D.   450 mg at 11/10/22 0631    clotrimazole (Mycelex) eli 10 mg  10 mg Oral 4X/DAY Misha Mehta M.D.   10 mg at 11/10/22 0759    levothyroxine (SYNTHROID) tablet 50 mcg  50 mcg Oral AM ES Misha Mehta M.D.   50 mcg at 11/10/22 0629    predniSONE (DELTASONE) tablet 10 mg  10 mg Oral DAILY Misha Mehta M.D.   10 mg at 11/10/22 0641    sulfamethoxazole-trimethoprim (BACTRIM DS) 800-160 MG tablet 0.5 Tablet  0.5 Tablet Oral DAILY Misha Mehta M.D.   0.5 Tablet at 11/10/22 0630     Current Outpatient Medications   Medication Sig Dispense Refill    [START ON 11/16/2022] valGANciclovir (VALCYTE) 450 MG tablet Take 1 Tablet by mouth every Wednesday. 1 tablet every Wednesday and Friday      oxyCODONE immediate-release (ROXICODONE) 5 MG Tab Take 1 Tablet by mouth every 8 hours as needed for Severe Pain for up to 3 days. 9 Tablet 0    predniSONE (DELTASONE) 5 MG Tab Take 2 Tablets by mouth every day.      carvedilol (COREG) 3.125 MG Tab Take 1 Tablet by mouth 2 times a day with meals.      pantoprazole (PROTONIX) 40 MG Tablet Delayed Response Take 1 Tablet by mouth every day.      atorvastatin (LIPITOR) 20 MG Tab Take 1 Tablet by mouth every  evening.      clotrimazole (MYCELEX) 10 MG Thanh thanh Take 1 Thanh by mouth 4 times a day.      mycophenolate (CELLCEPT) 500 MG tablet Take 2 Tablets by mouth 2 times a day.      sulfamethoxazole-trimethoprim (BACTRIM) 400-80 MG Tab Take 1 Tablet by mouth every day.      tacrolimus (PROGRAF) 1 MG Cap Take 1 Capsule by mouth 2 times a day.      fluticasone (FLONASE) 50 MCG/ACT nasal spray Administer 1 Spray into affected nostril(S) every day. 16 g 0    sevelamer carbonate (RENVELA) 800 MG Tab tablet Take 1 Tablet by mouth 3 times a day with meals. 270 Tablet 0    aspirin EC (ECOTRIN) 81 MG Tablet Delayed Response Take 1 Tablet by mouth every day.      Blood Glucose Meter Kit Test blood sugar as recommended by provider. Freestyle Pearl blood glucose monitoring kit. 1 Kit 0    Blood Glucose Test Strips Use one Freestyle Pearl strip to test blood sugar once daily . 100 Strip 0    Lancets Use one Freestyle Pearl lancet to test blood sugar once daily . 100 Each 0    Alcohol Swabs Wipe site with prep pad prior to injection. 100 Each 0    levothyroxine (SYNTHROID) 50 MCG Tab TOME FERNANDO TABLETA POR VIA ORAL CADA MANANA ON AN EMPTY STOMACH (Patient taking differently: Take 1 Tablet by mouth every morning on an empty stomach. TOME FERNANDO TABLETA POR VIA ORAL CADA MANANA ON AN EMPTY STOMACH  Per pharmacy pt last filled on 2022 for 90 day supply) 90 Tablet 3    apixaban (ELIQUIS) 2.5mg Tab Take 1 Tablet by mouth 2 times a day. Indications: Thromboembolism secondary to Atrial Fibrillation 180 Tablet 3         Assessment/Plan  71 y.o. female with a history of diabetes, atrial fibrillation, hypertension, ESRD on dialysis, status post  donor kidney transplant 10/31/2022 at [facility] in [place] complicated by delayed graft function requiring ongoing dialysis who presented 2022 with shortness of breath and chest pain, found to have volume overload, and concern for transplant renal artery stenosis.    1.  ESRD  status post  donor kidney transplant 10/31/2022 with delayed graft function requiring dialysis.  No acute need for HD today, continue  schedule while awaiting return of kidney function. Restrict fluids to 1L daily. Monitor strict urine output. Check labs daily. The elevated resistive indices on renal ultrasound are consistent with Delayed Graft Function / DGF, not consistent with rejection.     2.  Dialysis access: Left arm AV fistula, Patent.  Continue left arm precautions.    3.  Immunosuppression.  Continue mycophenolate 1000 mg p.o. twice daily, prednisone 10 mg p.o. daily, tacrolimus 1 mg p.o. twice daily.  Check trough tacrolimus level in the morning.  No need for pulse steroids.     4.  Infection prophylaxis.  Continue renal-dose adjusted clotrimazole, valganciclovir, Bactrim.    5.  Acute hypoxic respiratory failure, due to volume overload from high liquid intake at home. Much improved after UF with HD. 1L fluid restriction daily.     6.  CKD-MBD.  Check daily phosphorus levels along with daily labs.  Continue Renvela 800 mg p.o. 3 times daily with meals for now.    Discussed with Dr. Albertina VALENZUELA to discharge from Nephrology standpoint.   There is no need for outpatient nephrology clinic follow up appointment, as the patient will be seen by a nephrologist at their outpatient dialysis center.       Priyank Villar MD  Nephrology  [facility]

## 2022-11-10 NOTE — DISCHARGE SUMMARY
Discharge Summary    CHIEF COMPLAINT ON ADMISSION  Chief Complaint   Patient presents with    Chest Pain     Pt arrives to the ER via POV with family. Pt complains of L sided chest pain that radiates to L arm and neck. Pt received kidney transplant x 1 week ago in [facility]. Onset 0400.  Advised NPO until seen by provider. Pt verbalizes understanding.           Reason for Admission  Chest Pain      Admission Date  2022    CODE STATUS  Full Code    HPI & HOSPITAL COURSE    71-year-old female with past medical history of diabetes mellitus, A. fib on Xarelto, ESRD on HD s/p  donor kidney transplant on 10/31/2022 in [place] complicated by delayed graft function requiring ongoing dialysis presenting with chest pain and shortness of breath.  Nephrology was consulted and she was continued on her dialysis while in ER given concerns of volume overload.  Ultrasound of renal transplant showed absent diastolic flow concerning for graft rejection/failure.  This was initially discussed with transplant nephrologist at [facility] due to concerns of graft rejection and transfer was initiated.  However upon discussion on  with patient's symptom improvement after dialysis it was felt that ultrasound findings are likely consistent with delayed graft function and is not in fact due to rejection.  This was discussed in detail with patient and her family.  They are to follow-up with her transplant nephrologist in 1 to 2 weeks.  She is to be on a 1 L fluid restriction while she is receiving dialysis.    Therefore, she is discharged in good and stable condition to home with close outpatient follow-up.    The patient met 2-midnight criteria for an inpatient stay at the time of discharge.    Discharge Date  11/10/2022    FOLLOW UP ITEMS POST DISCHARGE  Follow-up with transplant nephrology    DISCHARGE DIAGNOSES  Principal Problem:    Acute renal transplant rejection POA: Yes  Active Problems:     Renovascular hypertension POA: Yes    Controlled type 2 diabetes mellitus with chronic kidney disease on chronic dialysis, without long-term current use of insulin (HCC) POA: Yes      Overview: Patient is only on pioglitazone for her diabetes. She used to follow with       endocrinology but has not visited since December 2021    Chronic diastolic heart failure (HCC) POA: Yes    ESRD (end stage renal disease) (HCC) POA: Yes      Overview: -Dialysis MWF      -follows with nephrology    Acquired hypothyroidism POA: Yes    Pancytopenia (HCC) POA: Yes    Paroxysmal A-fib (HCC) POA: Yes    Anemia, chronic disease POA: Yes    Secondary hypercoagulable state (HCC) POA: Yes    Thrombocytopenia (HCC) POA: Yes  Resolved Problems:    * No resolved hospital problems. *      FOLLOW UP  Future Appointments   Date Time Provider Department Center   12/14/2022  4:15 PM Milton Pettit M.D. [facility] None   2/6/2023  4:45 PM [facility] EXAM 4 [facility] None     [facility]  [address] [address]  [phone]        MEDICATIONS ON DISCHARGE     Medication List        START taking these medications        Instructions   oxyCODONE immediate-release 5 MG Tabs  Commonly known as: ROXICODONE   Take 1 Tablet by mouth every 8 hours as needed for Severe Pain for up to 3 days.  Dose: 5 mg            CHANGE how you take these medications        Instructions   levothyroxine 50 MCG Tabs  What changed:   how much to take  how to take this  when to take this  additional instructions  Commonly known as: SYNTHROID   TOME FERNANDO TABLETA POR VIA ORAL CADA MANANA ON AN EMPTY STOMACH     valGANciclovir 450 MG tablet  Start taking on: November 16, 2022  What changed:   when to take this  additional instructions  Commonly known as: VALCYTE   Take 1 Tablet by mouth every Wednesday. 1 tablet every Wednesday and Friday  Dose: 450 mg            CONTINUE taking these medications        Instructions   Alcohol Swabs   Doctor's comments: Per  formulary preference. ICD-10 code: E11.9 Controlled type 2 Diabetes Mellitus  Wipe site with prep pad prior to injection.     apixaban 2.5mg Tabs  Commonly known as: ELIQUIS   Take 1 Tablet by mouth 2 times a day. Indications: Thromboembolism secondary to Atrial Fibrillation  Dose: 2.5 mg     aspirin EC 81 MG Tbec  Commonly known as: ECOTRIN   Take 1 Tablet by mouth every day.  Dose: 81 mg     atorvastatin 20 MG Tabs  Commonly known as: LIPITOR   Take 1 Tablet by mouth every evening.  Dose: 20 mg     * Blood Glucose Meter Kit   Doctor's comments: Or per formulary preference. ICD-10 code: E11.9 Controlled type 2 Diabetes Mellitus  Test blood sugar as recommended by provider. Freestyle Pearl blood glucose monitoring kit.     * Blood Glucose Test Strips   Doctor's comments: Or per formulary preference. ICD-10 code: E11.9 Controlled type 2 Diabetes Mellitus  Use one Freestyle Pearl strip to test blood sugar once daily .     carvedilol 3.125 MG Tabs  Commonly known as: COREG   Take 1 Tablet by mouth 2 times a day with meals.  Dose: 3.125 mg     clotrimazole 10 MG Troc thanh  Commonly known as: Mycelex   Take 1 Thanh by mouth 4 times a day.  Dose: 10 mg     fluticasone 50 MCG/ACT nasal spray  Commonly known as: FLONASE   Administer 1 Spray into affected nostril(S) every day.  Dose: 1 Spray     Lancets   Doctor's comments: Or per formulary preference. ICD-10 code: E11.9 Controlled type 2 Diabetes Mellitus  Use one Freestyle Pearl lancet to test blood sugar once daily .     mycophenolate 500 MG tablet  Commonly known as: CELLCEPT   Take 2 Tablets by mouth 2 times a day.  Dose: 1,000 mg     pantoprazole 40 MG Tbec  Commonly known as: PROTONIX   Take 1 Tablet by mouth every day.  Dose: 40 mg     predniSONE 5 MG Tabs  Commonly known as: DELTASONE   Take 2 Tablets by mouth every day.  Dose: 10 mg     sevelamer carbonate 800 MG Tabs tablet  Commonly known as: RENVELA   Take 1 Tablet by mouth 3 times a day with meals.  Dose:  800 mg     sulfamethoxazole-trimethoprim 400-80 MG Tabs  Commonly known as: BACTRIM   Take 1 Tablet by mouth every day.  Dose: 1 Tablet     tacrolimus 1 MG Caps  Commonly known as: PROGRAF   Take 1 Capsule by mouth 2 times a day.  Dose: 1 mg           * This list has 2 medication(s) that are the same as other medications prescribed for you. Read the directions carefully, and ask your doctor or other care provider to review them with you.                  Allergies  No Known Allergies    DIET  Orders Placed This Encounter   Procedures    Diet Order Diet: Renal; Second Modifier: (optional): Consistent CHO (Diabetic)     Standing Status:   Standing     Number of Occurrences:   1     Order Specific Question:   Diet:     Answer:   Renal [8]     Order Specific Question:   Second Modifier: (optional)     Answer:   Consistent CHO (Diabetic) [4]       ACTIVITY  As tolerated.  Weight bearing as tolerated    CONSULTATIONS  Nephrology    PROCEDURES  None    LABORATORY  Lab Results   Component Value Date    SODIUM 136 11/09/2022    POTASSIUM 4.3 11/09/2022    CHLORIDE 96 11/09/2022    CO2 25 11/09/2022    GLUCOSE 77 11/09/2022    BUN 23 (H) 11/09/2022    CREATININE 2.78 (H) 11/09/2022        Lab Results   Component Value Date    WBC 3.2 (L) 11/09/2022    HEMOGLOBIN 7.8 (L) 11/09/2022    HEMATOCRIT 24.5 (L) 11/09/2022    PLATELETCT 99 (L) 11/09/2022        Total time of the discharge process exceeds 41 minutes.

## 2022-11-10 NOTE — PROCEDURES
Diagnosis: End-Stage Renal Disease s/p DDKT 10/31/22 complicated by DGF requiring dialysis. Patient seen and examined on hemodialysis during treatment. Patient is stable, tolerating hemodialysis. Denies chest pain and shortness of breath. Orders updated as needed. Please refer to flowsheet for full details.    Access: Left brachiocephalic AV fistula  UF goal: 3 L    Plan: Continue dialysis on a Monday Wednesday Friday schedule while waiting for delayed graft function of transplanted kidney to improve.  I spoke with transplant nephrology at [facility], and they recommend continuing to monitor for 14 to 20 days after transplant.  The abnormalities on transplant kidney ultrasound are consistent with DGF, not necessarily with rejection.  Patient is much improved after 2 days in a row of dialysis with extra ultrafiltration.  I instructed her that she should drink only 1 L of liquid a day at home while she is still requiring dialysis.  Fluid restriction will be lifted once her kidney transplant starts working better.  She should continue to obtain labs as directed by transplant center.  Regarding valganciclovir, appreciate pharmacy recommendations, transplant nephrology also recommending reducing dose of valganciclovir to twice a week.  Patient still having some postsurgical pain in the right lower quadrant, recommend discharge with a week supply of low-dose hydrocodone-acetaminophen.    OK to discharge from Nephrology standpoint.   There is no need for outpatient nephrology clinic follow up appointment right now, as the patient will be seen by a nephrologist at their outpatient dialysis center.       Priyank Villar MD  Nephrology   [facility]

## 2022-11-10 NOTE — CARE PLAN
The patient is Stable - Low risk of patient condition declining or worsening    Shift Goals  Clinical Goals: Manage pain  Patient Goals: Rest    Progress made toward(s) clinical / shift goals:    Problem: Pain - Standard  Goal: Alleviation of pain or a reduction in pain to the patient’s comfort goal  Outcome: Progressing     Problem: Knowledge Deficit - Standard  Goal: Patient and family/care givers will demonstrate understanding of plan of care, disease process/condition, diagnostic tests and medications  Outcome: Progressing  Note:  used for patient encounters. Reviewed POC with patient, all questions answered, no needs at this time.        Patient is not progressing towards the following goals:

## 2022-11-10 NOTE — DISCHARGE PLANNING
Received Choice form at 0947  Agency/Facility Name: [facility]  Referral sent per Choice form @ [id number]

## 2022-11-10 NOTE — PROGRESS NOTES
Telemetry Shift Summary     Rhythm: SA  HR: 58-65  Ectopy: rPVC, fPAC, Coup, Trip     5 bts VT     Measurements: 0.20/0.08/0.48    Normal Values  Rhythm: SR  HR:   Measurements: 0.12-0.20/0.08-0.10/0.30-0.52

## 2022-11-10 NOTE — PROGRESS NOTES
Pt discharged to home with HH. Discharge instructions provided to pt. Pt verbalizes understanding. Pt states all questions have been answered. Signed copy in chart. Prescriptions sent to [facility]. Pt states that all personal belongings are in possession. Pt off unit via wheelchair, escorted by RN.

## 2022-11-11 ENCOUNTER — PATIENT OUTREACH (OUTPATIENT)
Dept: MEDICAL GROUP | Facility: MEDICAL CENTER | Age: 72
End: 2022-11-11
Payer: MEDICARE

## 2022-11-11 LAB
BKV DNA # SPEC NAA+PROBE: <390 CPY/ML
BKV DNA SPEC NAA+PROBE-LOG#: <2.6 LOG
DIAGNOSTIC IMP SPEC-IMP: NOT DETECTED

## 2022-11-11 NOTE — PROGRESS NOTES
Telemetry Shift Summary    Rhythm SA  HR Range 60s-100s  Ectopy frequent PVC/PAC, rare couplets/bigeminy  Measurements 0.12/0.10/0.36        Normal Values  Rhythm SR  HR Range    Measurements 0.12-0.20 / 0.06-0.10  / 0.30-0.52

## 2022-11-15 ENCOUNTER — HOSPITAL ENCOUNTER (OUTPATIENT)
Dept: LAB | Facility: MEDICAL CENTER | Age: 72
End: 2022-11-15
Attending: INTERNAL MEDICINE
Payer: MEDICARE

## 2022-11-15 LAB
ANION GAP SERPL CALC-SCNC: 9 MMOL/L (ref 7–16)
BASOPHILS # BLD AUTO: 0.7 % (ref 0–1.8)
BASOPHILS # BLD: 0.03 K/UL (ref 0–0.12)
BUN SERPL-MCNC: 20 MG/DL (ref 8–22)
CALCIUM SERPL-MCNC: 9.9 MG/DL (ref 8.4–10.2)
CHLORIDE SERPL-SCNC: 97 MMOL/L (ref 96–112)
CO2 SERPL-SCNC: 29 MMOL/L (ref 20–33)
CREAT SERPL-MCNC: 1.7 MG/DL (ref 0.5–1.4)
EOSINOPHIL # BLD AUTO: 0.04 K/UL (ref 0–0.51)
EOSINOPHIL NFR BLD: 0.9 % (ref 0–6.9)
ERYTHROCYTE [DISTWIDTH] IN BLOOD BY AUTOMATED COUNT: 55.3 FL (ref 35.9–50)
GFR SERPLBLD CREATININE-BSD FMLA CKD-EPI: 31 ML/MIN/1.73 M 2
GLUCOSE SERPL-MCNC: 109 MG/DL (ref 65–99)
HCT VFR BLD AUTO: 29.1 % (ref 37–47)
HGB BLD-MCNC: 9.3 G/DL (ref 12–16)
IMM GRANULOCYTES # BLD AUTO: 0.02 K/UL (ref 0–0.11)
IMM GRANULOCYTES NFR BLD AUTO: 0.4 % (ref 0–0.9)
LYMPHOCYTES # BLD AUTO: 0.49 K/UL (ref 1–4.8)
LYMPHOCYTES NFR BLD: 11 % (ref 22–41)
MCH RBC QN AUTO: 30.5 PG (ref 27–33)
MCHC RBC AUTO-ENTMCNC: 32 G/DL (ref 33.6–35)
MCV RBC AUTO: 95.4 FL (ref 81.4–97.8)
MONOCYTES # BLD AUTO: 0.24 K/UL (ref 0–0.85)
MONOCYTES NFR BLD AUTO: 5.4 % (ref 0–13.4)
NEUTROPHILS # BLD AUTO: 3.65 K/UL (ref 2–7.15)
NEUTROPHILS NFR BLD: 81.6 % (ref 44–72)
NRBC # BLD AUTO: 0 K/UL
NRBC BLD-RTO: 0 /100 WBC
PHOSPHATE SERPL-MCNC: 1.8 MG/DL (ref 2.5–4.5)
PLATELET # BLD AUTO: 158 K/UL (ref 164–446)
PMV BLD AUTO: 11.3 FL (ref 9–12.9)
POTASSIUM SERPL-SCNC: 3.1 MMOL/L (ref 3.6–5.5)
RBC # BLD AUTO: 3.05 M/UL (ref 4.2–5.4)
SODIUM SERPL-SCNC: 135 MMOL/L (ref 135–145)
WBC # BLD AUTO: 4.5 K/UL (ref 4.8–10.8)

## 2022-11-15 PROCEDURE — 80048 BASIC METABOLIC PNL TOTAL CA: CPT

## 2022-11-15 PROCEDURE — 84100 ASSAY OF PHOSPHORUS: CPT

## 2022-11-15 PROCEDURE — 36415 COLL VENOUS BLD VENIPUNCTURE: CPT

## 2022-11-15 PROCEDURE — 80197 ASSAY OF TACROLIMUS: CPT

## 2022-11-15 PROCEDURE — 85025 COMPLETE CBC W/AUTO DIFF WBC: CPT

## 2022-11-17 ENCOUNTER — HOSPITAL ENCOUNTER (OUTPATIENT)
Dept: LAB | Facility: MEDICAL CENTER | Age: 72
End: 2022-11-17
Attending: INTERNAL MEDICINE
Payer: MEDICARE

## 2022-11-17 LAB
ANION GAP SERPL CALC-SCNC: 11 MMOL/L (ref 7–16)
BASOPHILS # BLD AUTO: 0.6 % (ref 0–1.8)
BASOPHILS # BLD: 0.02 K/UL (ref 0–0.12)
BUN SERPL-MCNC: 29 MG/DL (ref 8–22)
CALCIUM SERPL-MCNC: 10.2 MG/DL (ref 8.4–10.2)
CHLORIDE SERPL-SCNC: 101 MMOL/L (ref 96–112)
CO2 SERPL-SCNC: 26 MMOL/L (ref 20–33)
CREAT SERPL-MCNC: 1.48 MG/DL (ref 0.5–1.4)
EOSINOPHIL # BLD AUTO: 0.03 K/UL (ref 0–0.51)
EOSINOPHIL NFR BLD: 0.9 % (ref 0–6.9)
ERYTHROCYTE [DISTWIDTH] IN BLOOD BY AUTOMATED COUNT: 53.9 FL (ref 35.9–50)
GFR SERPLBLD CREATININE-BSD FMLA CKD-EPI: 37 ML/MIN/1.73 M 2
GLUCOSE SERPL-MCNC: 120 MG/DL (ref 65–99)
HCT VFR BLD AUTO: 27.6 % (ref 37–47)
HGB BLD-MCNC: 8.9 G/DL (ref 12–16)
IMM GRANULOCYTES # BLD AUTO: 0.01 K/UL (ref 0–0.11)
IMM GRANULOCYTES NFR BLD AUTO: 0.3 % (ref 0–0.9)
LYMPHOCYTES # BLD AUTO: 0.44 K/UL (ref 1–4.8)
LYMPHOCYTES NFR BLD: 13.3 % (ref 22–41)
MCH RBC QN AUTO: 30.5 PG (ref 27–33)
MCHC RBC AUTO-ENTMCNC: 32.2 G/DL (ref 33.6–35)
MCV RBC AUTO: 94.5 FL (ref 81.4–97.8)
MONOCYTES # BLD AUTO: 0.18 K/UL (ref 0–0.85)
MONOCYTES NFR BLD AUTO: 5.5 % (ref 0–13.4)
NEUTROPHILS # BLD AUTO: 2.62 K/UL (ref 2–7.15)
NEUTROPHILS NFR BLD: 79.4 % (ref 44–72)
NRBC # BLD AUTO: 0 K/UL
NRBC BLD-RTO: 0 /100 WBC
PHOSPHATE SERPL-MCNC: 1.9 MG/DL (ref 2.5–4.5)
PLATELET # BLD AUTO: 155 K/UL (ref 164–446)
PMV BLD AUTO: 10.9 FL (ref 9–12.9)
POTASSIUM SERPL-SCNC: 3.9 MMOL/L (ref 3.6–5.5)
RBC # BLD AUTO: 2.92 M/UL (ref 4.2–5.4)
SODIUM SERPL-SCNC: 138 MMOL/L (ref 135–145)
TACROLIMUS BLD-MCNC: 4.8 NG/ML
WBC # BLD AUTO: 3.3 K/UL (ref 4.8–10.8)

## 2022-11-17 PROCEDURE — 84100 ASSAY OF PHOSPHORUS: CPT

## 2022-11-17 PROCEDURE — 80048 BASIC METABOLIC PNL TOTAL CA: CPT

## 2022-11-17 PROCEDURE — 36415 COLL VENOUS BLD VENIPUNCTURE: CPT

## 2022-11-17 PROCEDURE — 85025 COMPLETE CBC W/AUTO DIFF WBC: CPT

## 2022-11-17 PROCEDURE — 80197 ASSAY OF TACROLIMUS: CPT

## 2022-11-17 NOTE — PROGRESS NOTES
Subjective:   Tere Gallegos is a 71 y.o. female here today for restless legs, hearing, appetite    RLS (restless legs syndrome)  Chronic. Significantly improved with recent trial of gabapentin 100 mg nightly and 100 mg prior to diolaysis.     Decreased hearing of right ear  Ongoing for about 6 months, hearing started to worsen and then she started to hear a buzzing sound. She is only hearing the buzzing sound when she lays down at night and during the day when she lays down.     No ear pain, drainage, no history of ear infections.     Her family member does recall that she was complaining of ear pain around the time that this happened.     Poor appetite  Ongoing for a few months. Doesn't feel like she has an appetite. She will force herself to eat but she will chew her food and it doesn't want to go down. She has tried using water to help food go down. She doesn't have issues with regurgitation of food or food getting stuck.     She just isn't feeling hungry. Sometimes she will eat, then other days she will eat. Doesn't seem to relate to dialysis.     She does feel depressed sometimes but not often, not daily.           Current medicines (including changes today)  Current Outpatient Medications   Medication Sig Dispense Refill    valGANciclovir (VALCYTE) 450 MG tablet Take 1 Tablet by mouth every Wednesday. 1 tablet every Wednesday and Friday      predniSONE (DELTASONE) 5 MG Tab Take 2 Tablets by mouth every day.      carvedilol (COREG) 3.125 MG Tab Take 1 Tablet by mouth 2 times a day with meals.      pantoprazole (PROTONIX) 40 MG Tablet Delayed Response Take 1 Tablet by mouth every day.      atorvastatin (LIPITOR) 20 MG Tab Take 1 Tablet by mouth every evening.      clotrimazole (MYCELEX) 10 MG Thanh thanh Take 1 Thanh by mouth 4 times a day.      mycophenolate (CELLCEPT) 500 MG tablet Take 2 Tablets by mouth 2 times a day.      sulfamethoxazole-trimethoprim (BACTRIM) 400-80 MG Tab Take 1 Tablet by  mouth every day.      tacrolimus (PROGRAF) 1 MG Cap Take 1 Capsule by mouth 2 times a day.      fluticasone (FLONASE) 50 MCG/ACT nasal spray Administer 1 Spray into affected nostril(S) every day. 16 g 0    sevelamer carbonate (RENVELA) 800 MG Tab tablet Take 1 Tablet by mouth 3 times a day with meals. 270 Tablet 0    aspirin EC (ECOTRIN) 81 MG Tablet Delayed Response Take 1 Tablet by mouth every day.      Blood Glucose Meter Kit Test blood sugar as recommended by provider. Freestyle Pearl blood glucose monitoring kit. 1 Kit 0    Blood Glucose Test Strips Use one Freestyle Pearl strip to test blood sugar once daily . 100 Strip 0    Lancets Use one Freestyle Pearl lancet to test blood sugar once daily . 100 Each 0    Alcohol Swabs Wipe site with prep pad prior to injection. 100 Each 0    levothyroxine (SYNTHROID) 50 MCG Tab TOME FERNANDO TABLETA POR VIA ORAL CADA MANANA ON AN EMPTY STOMACH (Patient taking differently: Take 1 Tablet by mouth every morning on an empty stomach. TOME FERNANDO TABLETA POR VIA ORAL CADA MANANA ON AN EMPTY STOMACH  Per pharmacy pt last filled on 8/26/2022 for 90 day supply) 90 Tablet 3    apixaban (ELIQUIS) 2.5mg Tab Take 1 Tablet by mouth 2 times a day. Indications: Thromboembolism secondary to Atrial Fibrillation 180 Tablet 3     No current facility-administered medications for this visit.     She  has a past medical history of Acquired hypothyroidism (05/04/2020), CAD (coronary artery disease), Chronic diastolic heart failure ([facility]) (05/04/2020), Coronary artery disease due to lipid rich plaque, Dental disorder, Diabetes ([facility]), ESRD (end stage renal disease) on dialysis ([facility]) (05/04/2020), Hemodialysis patient ([facility]), Hyperlipidemia, Hypertension, Kidney transplant candidate, Kidney transplant recipient (10/31/2022), Presence of drug-eluting stent in right coronary artery, QT prolongation (01/22/2020), RLS (restless legs syndrome) (08/05/2016), and Transaminitis (12/22/2018).    ROS   No chest pain,  "no shortness of breath, no abdominal pain  Positive ROS as per HPI.  All other systems reviewed and are negative.     Objective:     BP (!) 160/60   Pulse 72   Temp 36.3 °C (97.3 °F) (Temporal)   Resp 16   Ht 1.6 m (5' 3\")   Wt 64.1 kg (141 lb 6.4 oz)   SpO2 92%  Body mass index is 25.05 kg/m².     Physical Exam:  Constitutional: Alert, no distress.  Skin: Warm, dry, good turgor, no rashes in visible areas.  Eye: Equal, round and reactive, conjunctiva clear, lids normal.  ENMT: mask in place. B  Respiratory: Unlabored respiratory effort  Psych: Alert and oriented x3, normal affect and mood.      Assessment and Plan:   The following treatment plan was discussed    1. RLS (restless legs syndrome)  Stable  Continue gabapentin prior to dialysis and nightly  Check B12  - VITAMIN B12; Future    2. Decreased hearing of right ear  Unstable  Right cerumen impaction,  lavaged    3. Poor appetite  Unstable  Likely related to chronic co-morbid conditions vs DM gastroparesis vs other cause  Check labs  - CBC WITH DIFFERENTIAL; Future  - Comp Metabolic Panel; Future  - LIPASE; Future    4. Elevated lipase  Repeat lipase level  - LIPASE; Future    5. Encounter for screening for lipid disorder  - Lipid Profile; Future    6. Encounter for vitamin deficiency screening  - VITAMIN B12; Future    7. Elevated serum free T4 level  - FREE THYROXINE; Future    8. Subclinical hypothyroidism  - TSH; Future  - FREE THYROXINE; Future          Followup: Return in about 3 months (around 12/28/2022).    The MA is performing the above orders under the direction of Dr. Priest    Please note that this dictation was created using voice recognition software. I have made every reasonable attempt to correct obvious errors, but I expect that there are errors of grammar and possibly content that I did not discover before finalizing the note.               "

## 2022-11-18 ENCOUNTER — TELEPHONE (OUTPATIENT)
Dept: MEDICAL GROUP | Facility: MEDICAL CENTER | Age: 72
End: 2022-11-18
Payer: MEDICARE

## 2022-11-18 LAB — TACROLIMUS BLD-MCNC: 5.8 NG/ML

## 2022-11-21 ENCOUNTER — HOSPITAL ENCOUNTER (OUTPATIENT)
Dept: LAB | Facility: MEDICAL CENTER | Age: 72
End: 2022-11-21
Attending: INTERNAL MEDICINE
Payer: MEDICARE

## 2022-11-21 LAB
ANION GAP SERPL CALC-SCNC: 9 MMOL/L (ref 7–16)
BASOPHILS # BLD AUTO: 0.6 % (ref 0–1.8)
BASOPHILS # BLD: 0.02 K/UL (ref 0–0.12)
BUN SERPL-MCNC: 22 MG/DL (ref 8–22)
CALCIUM SERPL-MCNC: 10.5 MG/DL (ref 8.4–10.2)
CHLORIDE SERPL-SCNC: 104 MMOL/L (ref 96–112)
CO2 SERPL-SCNC: 27 MMOL/L (ref 20–33)
CREAT SERPL-MCNC: 1.35 MG/DL (ref 0.5–1.4)
EOSINOPHIL # BLD AUTO: 0.02 K/UL (ref 0–0.51)
EOSINOPHIL NFR BLD: 0.6 % (ref 0–6.9)
ERYTHROCYTE [DISTWIDTH] IN BLOOD BY AUTOMATED COUNT: 56.2 FL (ref 35.9–50)
FASTING STATUS PATIENT QL REPORTED: NORMAL
FASTING STATUS PATIENT QL REPORTED: NORMAL
GFR SERPLBLD CREATININE-BSD FMLA CKD-EPI: 42 ML/MIN/1.73 M 2
GLUCOSE SERPL-MCNC: 117 MG/DL (ref 65–99)
HCT VFR BLD AUTO: 28.8 % (ref 37–47)
HGB BLD-MCNC: 9.2 G/DL (ref 12–16)
IMM GRANULOCYTES # BLD AUTO: 0.01 K/UL (ref 0–0.11)
IMM GRANULOCYTES NFR BLD AUTO: 0.3 % (ref 0–0.9)
LYMPHOCYTES # BLD AUTO: 0.52 K/UL (ref 1–4.8)
LYMPHOCYTES NFR BLD: 14.8 % (ref 22–41)
MCH RBC QN AUTO: 30.3 PG (ref 27–33)
MCHC RBC AUTO-ENTMCNC: 31.9 G/DL (ref 33.6–35)
MCV RBC AUTO: 94.7 FL (ref 81.4–97.8)
MONOCYTES # BLD AUTO: 0.27 K/UL (ref 0–0.85)
MONOCYTES NFR BLD AUTO: 7.7 % (ref 0–13.4)
NEUTROPHILS # BLD AUTO: 2.67 K/UL (ref 2–7.15)
NEUTROPHILS NFR BLD: 76 % (ref 44–72)
NRBC # BLD AUTO: 0 K/UL
NRBC BLD-RTO: 0 /100 WBC
PHOSPHATE SERPL-MCNC: 1.6 MG/DL (ref 2.5–4.5)
PLATELET # BLD AUTO: 182 K/UL (ref 164–446)
PMV BLD AUTO: 11.1 FL (ref 9–12.9)
POTASSIUM SERPL-SCNC: 4 MMOL/L (ref 3.6–5.5)
RBC # BLD AUTO: 3.04 M/UL (ref 4.2–5.4)
SODIUM SERPL-SCNC: 140 MMOL/L (ref 135–145)
WBC # BLD AUTO: 3.5 K/UL (ref 4.8–10.8)

## 2022-11-21 PROCEDURE — 85025 COMPLETE CBC W/AUTO DIFF WBC: CPT

## 2022-11-21 PROCEDURE — 84100 ASSAY OF PHOSPHORUS: CPT

## 2022-11-21 PROCEDURE — 80048 BASIC METABOLIC PNL TOTAL CA: CPT

## 2022-11-21 PROCEDURE — 80197 ASSAY OF TACROLIMUS: CPT

## 2022-11-21 PROCEDURE — 36415 COLL VENOUS BLD VENIPUNCTURE: CPT

## 2022-11-23 ENCOUNTER — HOSPITAL ENCOUNTER (OUTPATIENT)
Dept: LAB | Facility: MEDICAL CENTER | Age: 72
End: 2022-11-23
Attending: INTERNAL MEDICINE
Payer: MEDICARE

## 2022-11-23 LAB
ANION GAP SERPL CALC-SCNC: 8 MMOL/L (ref 7–16)
BASOPHILS # BLD AUTO: 0.3 % (ref 0–1.8)
BASOPHILS # BLD: 0.01 K/UL (ref 0–0.12)
BUN SERPL-MCNC: 23 MG/DL (ref 8–22)
CALCIUM SERPL-MCNC: 10.5 MG/DL (ref 8.4–10.2)
CHLORIDE SERPL-SCNC: 107 MMOL/L (ref 96–112)
CO2 SERPL-SCNC: 24 MMOL/L (ref 20–33)
CREAT SERPL-MCNC: 1.14 MG/DL (ref 0.5–1.4)
EOSINOPHIL # BLD AUTO: 0.03 K/UL (ref 0–0.51)
EOSINOPHIL NFR BLD: 0.9 % (ref 0–6.9)
ERYTHROCYTE [DISTWIDTH] IN BLOOD BY AUTOMATED COUNT: 59.5 FL (ref 35.9–50)
FASTING STATUS PATIENT QL REPORTED: NORMAL
GFR SERPLBLD CREATININE-BSD FMLA CKD-EPI: 51 ML/MIN/1.73 M 2
GLUCOSE SERPL-MCNC: 122 MG/DL (ref 65–99)
HCT VFR BLD AUTO: 29.7 % (ref 37–47)
HGB BLD-MCNC: 9.4 G/DL (ref 12–16)
IMM GRANULOCYTES # BLD AUTO: 0.01 K/UL (ref 0–0.11)
IMM GRANULOCYTES NFR BLD AUTO: 0.3 % (ref 0–0.9)
LYMPHOCYTES # BLD AUTO: 0.54 K/UL (ref 1–4.8)
LYMPHOCYTES NFR BLD: 15.8 % (ref 22–41)
MCH RBC QN AUTO: 30.6 PG (ref 27–33)
MCHC RBC AUTO-ENTMCNC: 31.6 G/DL (ref 33.6–35)
MCV RBC AUTO: 96.7 FL (ref 81.4–97.8)
MONOCYTES # BLD AUTO: 0.24 K/UL (ref 0–0.85)
MONOCYTES NFR BLD AUTO: 7 % (ref 0–13.4)
NEUTROPHILS # BLD AUTO: 2.58 K/UL (ref 2–7.15)
NEUTROPHILS NFR BLD: 75.7 % (ref 44–72)
NRBC # BLD AUTO: 0 K/UL
NRBC BLD-RTO: 0 /100 WBC
PHOSPHATE SERPL-MCNC: 1.3 MG/DL (ref 2.5–4.5)
PLATELET # BLD AUTO: 184 K/UL (ref 164–446)
PMV BLD AUTO: 10.8 FL (ref 9–12.9)
POTASSIUM SERPL-SCNC: 4.3 MMOL/L (ref 3.6–5.5)
RBC # BLD AUTO: 3.07 M/UL (ref 4.2–5.4)
SODIUM SERPL-SCNC: 139 MMOL/L (ref 135–145)
TACROLIMUS BLD-MCNC: 3.8 NG/ML
WBC # BLD AUTO: 3.4 K/UL (ref 4.8–10.8)

## 2022-11-23 PROCEDURE — 36415 COLL VENOUS BLD VENIPUNCTURE: CPT

## 2022-11-23 PROCEDURE — 80048 BASIC METABOLIC PNL TOTAL CA: CPT

## 2022-11-23 PROCEDURE — 85025 COMPLETE CBC W/AUTO DIFF WBC: CPT

## 2022-11-23 PROCEDURE — 84100 ASSAY OF PHOSPHORUS: CPT

## 2022-11-23 PROCEDURE — 80197 ASSAY OF TACROLIMUS: CPT

## 2022-11-25 LAB — TACROLIMUS BLD-MCNC: 6.2 NG/ML

## 2022-11-27 ENCOUNTER — APPOINTMENT (OUTPATIENT)
Dept: RADIOLOGY | Facility: MEDICAL CENTER | Age: 72
End: 2022-11-27
Attending: EMERGENCY MEDICINE
Payer: MEDICARE

## 2022-11-27 ENCOUNTER — HOSPITAL ENCOUNTER (INPATIENT)
Facility: MEDICAL CENTER | Age: 72
LOS: 2 days | DRG: 281 | End: 2022-11-29
Attending: STUDENT IN AN ORGANIZED HEALTH CARE EDUCATION/TRAINING PROGRAM | Admitting: INTERNAL MEDICINE
Payer: MEDICARE

## 2022-11-27 ENCOUNTER — HOSPITAL ENCOUNTER (EMERGENCY)
Facility: MEDICAL CENTER | Age: 72
End: 2022-11-27
Attending: EMERGENCY MEDICINE
Payer: MEDICARE

## 2022-11-27 VITALS
OXYGEN SATURATION: 97 % | TEMPERATURE: 96.5 F | HEART RATE: 54 BPM | WEIGHT: 140.43 LBS | BODY MASS INDEX: 27.43 KG/M2 | RESPIRATION RATE: 14 BRPM | DIASTOLIC BLOOD PRESSURE: 51 MMHG | SYSTOLIC BLOOD PRESSURE: 120 MMHG

## 2022-11-27 DIAGNOSIS — T86.11 ACUTE RENAL TRANSPLANT REJECTION: ICD-10-CM

## 2022-11-27 DIAGNOSIS — R07.9 CHEST PAIN, UNSPECIFIED TYPE: ICD-10-CM

## 2022-11-27 DIAGNOSIS — I21.4 NSTEMI (NON-ST ELEVATED MYOCARDIAL INFARCTION) (HCC): ICD-10-CM

## 2022-11-27 DIAGNOSIS — I50.31 ACUTE DIASTOLIC CHF (CONGESTIVE HEART FAILURE) (HCC): ICD-10-CM

## 2022-11-27 DIAGNOSIS — I48.91 ATRIAL FIBRILLATION WITH RAPID VENTRICULAR RESPONSE (HCC): ICD-10-CM

## 2022-11-27 DIAGNOSIS — I48.91 ATRIAL FIBRILLATION WITH RVR (HCC): ICD-10-CM

## 2022-11-27 DIAGNOSIS — R79.89 ELEVATED TROPONIN: ICD-10-CM

## 2022-11-27 DIAGNOSIS — Z94.0 KIDNEY TRANSPLANT RECIPIENT: ICD-10-CM

## 2022-11-27 LAB
ALBUMIN SERPL BCP-MCNC: 3.8 G/DL (ref 3.2–4.9)
ALBUMIN/GLOB SERPL: 1.6 G/DL
ALP SERPL-CCNC: 113 U/L (ref 30–99)
ALT SERPL-CCNC: 7 U/L (ref 2–50)
ANION GAP SERPL CALC-SCNC: 10 MMOL/L (ref 7–16)
APPEARANCE UR: CLEAR
APTT PPP: 29.4 SEC (ref 24.7–36)
AST SERPL-CCNC: 12 U/L (ref 12–45)
BACTERIA #/AREA URNS HPF: ABNORMAL /HPF
BASOPHILS # BLD AUTO: 0.3 % (ref 0–1.8)
BASOPHILS # BLD: 0.01 K/UL (ref 0–0.12)
BILIRUB SERPL-MCNC: 0.3 MG/DL (ref 0.1–1.5)
BILIRUB UR QL STRIP.AUTO: NEGATIVE
BUN SERPL-MCNC: 18 MG/DL (ref 8–22)
CALCIUM SERPL-MCNC: 10.2 MG/DL (ref 8.4–10.2)
CHLORIDE SERPL-SCNC: 109 MMOL/L (ref 96–112)
CO2 SERPL-SCNC: 18 MMOL/L (ref 20–33)
COLOR UR: YELLOW
CREAT SERPL-MCNC: 1.11 MG/DL (ref 0.5–1.4)
EKG IMPRESSION: NORMAL
EOSINOPHIL # BLD AUTO: 0.02 K/UL (ref 0–0.51)
EOSINOPHIL NFR BLD: 0.6 % (ref 0–6.9)
EPI CELLS #/AREA URNS HPF: ABNORMAL /HPF
ERYTHROCYTE [DISTWIDTH] IN BLOOD BY AUTOMATED COUNT: 58.1 FL (ref 35.9–50)
GFR SERPLBLD CREATININE-BSD FMLA CKD-EPI: 52 ML/MIN/1.73 M 2
GLOBULIN SER CALC-MCNC: 2.4 G/DL (ref 1.9–3.5)
GLUCOSE BLD STRIP.AUTO-MCNC: 158 MG/DL (ref 65–99)
GLUCOSE BLD STRIP.AUTO-MCNC: 98 MG/DL (ref 65–99)
GLUCOSE SERPL-MCNC: 138 MG/DL (ref 65–99)
GLUCOSE UR STRIP.AUTO-MCNC: NEGATIVE MG/DL
HCT VFR BLD AUTO: 29.4 % (ref 37–47)
HGB BLD-MCNC: 9.3 G/DL (ref 12–16)
IMM GRANULOCYTES # BLD AUTO: 0.01 K/UL (ref 0–0.11)
IMM GRANULOCYTES NFR BLD AUTO: 0.3 % (ref 0–0.9)
INR PPP: 1.31 (ref 0.87–1.13)
KETONES UR STRIP.AUTO-MCNC: NEGATIVE MG/DL
LEUKOCYTE ESTERASE UR QL STRIP.AUTO: NEGATIVE
LYMPHOCYTES # BLD AUTO: 0.38 K/UL (ref 1–4.8)
LYMPHOCYTES NFR BLD: 11 % (ref 22–41)
MAGNESIUM SERPL-MCNC: 1.3 MG/DL (ref 1.5–2.5)
MAGNESIUM SERPL-MCNC: 1.8 MG/DL (ref 1.5–2.5)
MCH RBC QN AUTO: 30 PG (ref 27–33)
MCHC RBC AUTO-ENTMCNC: 31.6 G/DL (ref 33.6–35)
MCV RBC AUTO: 94.8 FL (ref 81.4–97.8)
MICRO URNS: ABNORMAL
MONOCYTES # BLD AUTO: 0.18 K/UL (ref 0–0.85)
MONOCYTES NFR BLD AUTO: 5.2 % (ref 0–13.4)
NEUTROPHILS # BLD AUTO: 2.86 K/UL (ref 2–7.15)
NEUTROPHILS NFR BLD: 82.6 % (ref 44–72)
NITRITE UR QL STRIP.AUTO: NEGATIVE
NRBC # BLD AUTO: 0 K/UL
NRBC BLD-RTO: 0 /100 WBC
PH UR STRIP.AUTO: 6 [PH] (ref 5–8)
PHOSPHATE SERPL-MCNC: 1.5 MG/DL (ref 2.5–4.5)
PLATELET # BLD AUTO: 168 K/UL (ref 164–446)
PMV BLD AUTO: 10.7 FL (ref 9–12.9)
POTASSIUM SERPL-SCNC: 4.7 MMOL/L (ref 3.6–5.5)
PROT SERPL-MCNC: 6.2 G/DL (ref 6–8.2)
PROT UR QL STRIP: NEGATIVE MG/DL
PROTHROMBIN TIME: 15.7 SEC (ref 12–14.6)
RBC # BLD AUTO: 3.1 M/UL (ref 4.2–5.4)
RBC # URNS HPF: ABNORMAL /HPF
RBC UR QL AUTO: ABNORMAL
SODIUM SERPL-SCNC: 137 MMOL/L (ref 135–145)
SP GR UR STRIP.AUTO: 1.01
T4 FREE SERPL-MCNC: 1.67 NG/DL (ref 0.93–1.7)
TROPONIN T SERPL-MCNC: 32 NG/L (ref 6–19)
TROPONIN T SERPL-MCNC: 39 NG/L (ref 6–19)
TROPONIN T SERPL-MCNC: 44 NG/L (ref 6–19)
TSH SERPL DL<=0.005 MIU/L-ACNC: 1.55 UIU/ML (ref 0.38–5.33)
WBC # BLD AUTO: 3.5 K/UL (ref 4.8–10.8)
WBC #/AREA URNS HPF: ABNORMAL /HPF

## 2022-11-27 PROCEDURE — 96366 THER/PROPH/DIAG IV INF ADDON: CPT

## 2022-11-27 PROCEDURE — 84439 ASSAY OF FREE THYROXINE: CPT

## 2022-11-27 PROCEDURE — 700111 HCHG RX REV CODE 636 W/ 250 OVERRIDE (IP): Performed by: INTERNAL MEDICINE

## 2022-11-27 PROCEDURE — 84484 ASSAY OF TROPONIN QUANT: CPT | Mod: 91

## 2022-11-27 PROCEDURE — 81001 URINALYSIS AUTO W/SCOPE: CPT

## 2022-11-27 PROCEDURE — 96365 THER/PROPH/DIAG IV INF INIT: CPT | Mod: XU

## 2022-11-27 PROCEDURE — 83735 ASSAY OF MAGNESIUM: CPT

## 2022-11-27 PROCEDURE — 36415 COLL VENOUS BLD VENIPUNCTURE: CPT

## 2022-11-27 PROCEDURE — 700102 HCHG RX REV CODE 250 W/ 637 OVERRIDE(OP): Performed by: INTERNAL MEDICINE

## 2022-11-27 PROCEDURE — 85025 COMPLETE CBC W/AUTO DIFF WBC: CPT

## 2022-11-27 PROCEDURE — 84443 ASSAY THYROID STIM HORMONE: CPT

## 2022-11-27 PROCEDURE — 96375 TX/PRO/DX INJ NEW DRUG ADDON: CPT | Mod: XU

## 2022-11-27 PROCEDURE — 71045 X-RAY EXAM CHEST 1 VIEW: CPT

## 2022-11-27 PROCEDURE — 85730 THROMBOPLASTIN TIME PARTIAL: CPT

## 2022-11-27 PROCEDURE — 80053 COMPREHEN METABOLIC PANEL: CPT

## 2022-11-27 PROCEDURE — 99223 1ST HOSP IP/OBS HIGH 75: CPT | Mod: AI | Performed by: INTERNAL MEDICINE

## 2022-11-27 PROCEDURE — 99223 1ST HOSP IP/OBS HIGH 75: CPT | Performed by: INTERNAL MEDICINE

## 2022-11-27 PROCEDURE — 700111 HCHG RX REV CODE 636 W/ 250 OVERRIDE (IP): Performed by: EMERGENCY MEDICINE

## 2022-11-27 PROCEDURE — 700105 HCHG RX REV CODE 258: Performed by: EMERGENCY MEDICINE

## 2022-11-27 PROCEDURE — 71275 CT ANGIOGRAPHY CHEST: CPT

## 2022-11-27 PROCEDURE — 87040 BLOOD CULTURE FOR BACTERIA: CPT

## 2022-11-27 PROCEDURE — 93005 ELECTROCARDIOGRAM TRACING: CPT | Performed by: INTERNAL MEDICINE

## 2022-11-27 PROCEDURE — 770020 HCHG ROOM/CARE - TELE (206)

## 2022-11-27 PROCEDURE — 99222 1ST HOSP IP/OBS MODERATE 55: CPT | Performed by: INTERNAL MEDICINE

## 2022-11-27 PROCEDURE — 83735 ASSAY OF MAGNESIUM: CPT | Mod: 91

## 2022-11-27 PROCEDURE — 93005 ELECTROCARDIOGRAM TRACING: CPT | Performed by: EMERGENCY MEDICINE

## 2022-11-27 PROCEDURE — 700117 HCHG RX CONTRAST REV CODE 255: Performed by: EMERGENCY MEDICINE

## 2022-11-27 PROCEDURE — 84484 ASSAY OF TROPONIN QUANT: CPT

## 2022-11-27 PROCEDURE — A9270 NON-COVERED ITEM OR SERVICE: HCPCS | Performed by: INTERNAL MEDICINE

## 2022-11-27 PROCEDURE — 84100 ASSAY OF PHOSPHORUS: CPT

## 2022-11-27 PROCEDURE — 99285 EMERGENCY DEPT VISIT HI MDM: CPT

## 2022-11-27 PROCEDURE — 82962 GLUCOSE BLOOD TEST: CPT

## 2022-11-27 PROCEDURE — 93005 ELECTROCARDIOGRAM TRACING: CPT

## 2022-11-27 PROCEDURE — 85610 PROTHROMBIN TIME: CPT

## 2022-11-27 RX ORDER — AMOXICILLIN 250 MG
2 CAPSULE ORAL 2 TIMES DAILY
Status: DISCONTINUED | OUTPATIENT
Start: 2022-11-27 | End: 2022-11-29 | Stop reason: HOSPADM

## 2022-11-27 RX ORDER — ONDANSETRON 4 MG/1
4 TABLET, ORALLY DISINTEGRATING ORAL EVERY 4 HOURS PRN
Status: DISCONTINUED | OUTPATIENT
Start: 2022-11-27 | End: 2022-11-29 | Stop reason: HOSPADM

## 2022-11-27 RX ORDER — OXYCODONE HYDROCHLORIDE 5 MG/1
5 TABLET ORAL
Status: DISCONTINUED | OUTPATIENT
Start: 2022-11-27 | End: 2022-11-29 | Stop reason: HOSPADM

## 2022-11-27 RX ORDER — LABETALOL HYDROCHLORIDE 5 MG/ML
10 INJECTION, SOLUTION INTRAVENOUS EVERY 4 HOURS PRN
Status: DISCONTINUED | OUTPATIENT
Start: 2022-11-27 | End: 2022-11-28

## 2022-11-27 RX ORDER — SULFAMETHOXAZOLE AND TRIMETHOPRIM 400; 80 MG/1; MG/1
1 TABLET ORAL DAILY
Status: DISCONTINUED | OUTPATIENT
Start: 2022-11-27 | End: 2022-11-29 | Stop reason: HOSPADM

## 2022-11-27 RX ORDER — ASPIRIN 81 MG/1
324 TABLET, CHEWABLE ORAL DAILY
Status: DISCONTINUED | OUTPATIENT
Start: 2022-11-27 | End: 2022-11-28

## 2022-11-27 RX ORDER — LEVOTHYROXINE SODIUM 0.05 MG/1
50 TABLET ORAL
COMMUNITY
End: 2023-01-03

## 2022-11-27 RX ORDER — MORPHINE SULFATE 4 MG/ML
2 INJECTION INTRAVENOUS
Status: DISCONTINUED | OUTPATIENT
Start: 2022-11-27 | End: 2022-11-29 | Stop reason: HOSPADM

## 2022-11-27 RX ORDER — BISACODYL 10 MG
10 SUPPOSITORY, RECTAL RECTAL
Status: DISCONTINUED | OUTPATIENT
Start: 2022-11-27 | End: 2022-11-29 | Stop reason: HOSPADM

## 2022-11-27 RX ORDER — ACETAMINOPHEN 500 MG
500-1000 TABLET ORAL EVERY 6 HOURS PRN
Status: SHIPPED | COMMUNITY
End: 2022-12-27

## 2022-11-27 RX ORDER — ASPIRIN 300 MG/1
300 SUPPOSITORY RECTAL DAILY
Status: DISCONTINUED | OUTPATIENT
Start: 2022-11-27 | End: 2022-11-28

## 2022-11-27 RX ORDER — INSULIN ASPART 100 [IU]/ML
0-12 INJECTION, SOLUTION INTRAVENOUS; SUBCUTANEOUS
COMMUNITY

## 2022-11-27 RX ORDER — DILTIAZEM HYDROCHLORIDE 5 MG/ML
10 INJECTION INTRAVENOUS ONCE
Status: DISCONTINUED | OUTPATIENT
Start: 2022-11-27 | End: 2022-11-27 | Stop reason: HOSPADM

## 2022-11-27 RX ORDER — ASPIRIN 325 MG
325 TABLET ORAL DAILY
Status: DISCONTINUED | OUTPATIENT
Start: 2022-11-27 | End: 2022-11-28

## 2022-11-27 RX ORDER — ATORVASTATIN CALCIUM 20 MG/1
20 TABLET, FILM COATED ORAL NIGHTLY
Status: DISCONTINUED | OUTPATIENT
Start: 2022-11-27 | End: 2022-11-29 | Stop reason: HOSPADM

## 2022-11-27 RX ORDER — PREDNISONE 10 MG/1
10 TABLET ORAL DAILY
Status: DISCONTINUED | OUTPATIENT
Start: 2022-11-27 | End: 2022-11-29 | Stop reason: HOSPADM

## 2022-11-27 RX ORDER — HYDROCODONE BITARTRATE AND ACETAMINOPHEN 5; 325 MG/1; MG/1
1 TABLET ORAL EVERY 6 HOURS PRN
Status: DISCONTINUED | OUTPATIENT
Start: 2022-11-27 | End: 2022-11-27

## 2022-11-27 RX ORDER — ONDANSETRON 2 MG/ML
4 INJECTION INTRAMUSCULAR; INTRAVENOUS EVERY 4 HOURS PRN
Status: DISCONTINUED | OUTPATIENT
Start: 2022-11-27 | End: 2022-11-29 | Stop reason: HOSPADM

## 2022-11-27 RX ORDER — MORPHINE SULFATE 4 MG/ML
4 INJECTION INTRAVENOUS ONCE
Status: COMPLETED | OUTPATIENT
Start: 2022-11-27 | End: 2022-11-27

## 2022-11-27 RX ORDER — INSULIN GLARGINE 100 [IU]/ML
8 INJECTION, SOLUTION SUBCUTANEOUS DAILY
Status: SHIPPED | COMMUNITY
End: 2023-03-03

## 2022-11-27 RX ORDER — MYCOPHENOLATE MOFETIL 250 MG/1
1000 CAPSULE ORAL EVERY 12 HOURS
Status: DISCONTINUED | OUTPATIENT
Start: 2022-11-27 | End: 2022-11-29 | Stop reason: HOSPADM

## 2022-11-27 RX ORDER — OXYCODONE HYDROCHLORIDE 5 MG/1
2.5 TABLET ORAL
Status: DISCONTINUED | OUTPATIENT
Start: 2022-11-27 | End: 2022-11-29 | Stop reason: HOSPADM

## 2022-11-27 RX ORDER — MORPHINE SULFATE 4 MG/ML
2 INJECTION INTRAVENOUS
Status: DISCONTINUED | OUTPATIENT
Start: 2022-11-27 | End: 2022-11-27

## 2022-11-27 RX ORDER — LEVOTHYROXINE SODIUM 0.05 MG/1
50 TABLET ORAL
Status: DISCONTINUED | OUTPATIENT
Start: 2022-11-28 | End: 2022-11-29 | Stop reason: HOSPADM

## 2022-11-27 RX ORDER — OMEPRAZOLE 20 MG/1
40 CAPSULE, DELAYED RELEASE ORAL DAILY
Status: DISCONTINUED | OUTPATIENT
Start: 2022-11-27 | End: 2022-11-29 | Stop reason: HOSPADM

## 2022-11-27 RX ORDER — AMIODARONE HYDROCHLORIDE 200 MG/1
400 TABLET ORAL 2 TIMES DAILY
Status: DISCONTINUED | OUTPATIENT
Start: 2022-11-27 | End: 2022-11-28

## 2022-11-27 RX ORDER — ACETAMINOPHEN 325 MG/1
650 TABLET ORAL EVERY 6 HOURS PRN
Status: DISCONTINUED | OUTPATIENT
Start: 2022-11-27 | End: 2022-11-29 | Stop reason: HOSPADM

## 2022-11-27 RX ORDER — CLOTRIMAZOLE 10 MG/1
10 LOZENGE ORAL; TOPICAL 4 TIMES DAILY
Status: DISCONTINUED | OUTPATIENT
Start: 2022-11-27 | End: 2022-11-29 | Stop reason: HOSPADM

## 2022-11-27 RX ORDER — SODIUM CHLORIDE, SODIUM LACTATE, POTASSIUM CHLORIDE, CALCIUM CHLORIDE 600; 310; 30; 20 MG/100ML; MG/100ML; MG/100ML; MG/100ML
INJECTION, SOLUTION INTRAVENOUS CONTINUOUS
Status: DISCONTINUED | OUTPATIENT
Start: 2022-11-27 | End: 2022-11-27 | Stop reason: HOSPADM

## 2022-11-27 RX ORDER — HYDROCODONE BITARTRATE AND ACETAMINOPHEN 5; 325 MG/1; MG/1
1 TABLET ORAL EVERY 6 HOURS PRN
Status: SHIPPED | COMMUNITY
End: 2022-12-27

## 2022-11-27 RX ORDER — DILTIAZEM HYDROCHLORIDE 5 MG/ML
10 INJECTION INTRAVENOUS ONCE
Status: COMPLETED | OUTPATIENT
Start: 2022-11-27 | End: 2022-11-27

## 2022-11-27 RX ORDER — VALGANCICLOVIR 450 MG/1
450 TABLET, FILM COATED ORAL DAILY
Status: DISCONTINUED | OUTPATIENT
Start: 2022-11-27 | End: 2022-11-29 | Stop reason: HOSPADM

## 2022-11-27 RX ORDER — MAGNESIUM SULFATE HEPTAHYDRATE 40 MG/ML
2 INJECTION, SOLUTION INTRAVENOUS ONCE
Status: COMPLETED | OUTPATIENT
Start: 2022-11-27 | End: 2022-11-27

## 2022-11-27 RX ORDER — TACROLIMUS 1 MG/1
1 CAPSULE ORAL EVERY 12 HOURS
Status: DISCONTINUED | OUTPATIENT
Start: 2022-11-27 | End: 2022-11-29 | Stop reason: HOSPADM

## 2022-11-27 RX ORDER — SODIUM CHLORIDE, SODIUM LACTATE, POTASSIUM CHLORIDE, CALCIUM CHLORIDE 600; 310; 30; 20 MG/100ML; MG/100ML; MG/100ML; MG/100ML
1000 INJECTION, SOLUTION INTRAVENOUS ONCE
Status: DISCONTINUED | OUTPATIENT
Start: 2022-11-27 | End: 2022-11-27

## 2022-11-27 RX ORDER — POLYETHYLENE GLYCOL 3350 17 G/17G
1 POWDER, FOR SOLUTION ORAL
Status: DISCONTINUED | OUTPATIENT
Start: 2022-11-27 | End: 2022-11-29 | Stop reason: HOSPADM

## 2022-11-27 RX ORDER — ONDANSETRON 2 MG/ML
4 INJECTION INTRAMUSCULAR; INTRAVENOUS ONCE
Status: COMPLETED | OUTPATIENT
Start: 2022-11-27 | End: 2022-11-27

## 2022-11-27 RX ADMIN — CLOTRIMAZOLE 10 MG: 10 LOZENGE ORAL; TOPICAL at 20:24

## 2022-11-27 RX ADMIN — ASPIRIN 325 MG: 325 TABLET ORAL at 18:44

## 2022-11-27 RX ADMIN — SENNOSIDES AND DOCUSATE SODIUM 2 TABLET: 50; 8.6 TABLET ORAL at 18:45

## 2022-11-27 RX ADMIN — PREDNISONE 10 MG: 10 TABLET ORAL at 18:45

## 2022-11-27 RX ADMIN — IOHEXOL 100 ML: 350 INJECTION, SOLUTION INTRAVENOUS at 12:30

## 2022-11-27 RX ADMIN — ATORVASTATIN CALCIUM 20 MG: 20 TABLET, FILM COATED ORAL at 20:23

## 2022-11-27 RX ADMIN — VALGANCICLOVIR 450 MG: 450 TABLET, FILM COATED ORAL at 20:25

## 2022-11-27 RX ADMIN — MORPHINE SULFATE 4 MG: 4 INJECTION INTRAVENOUS at 11:14

## 2022-11-27 RX ADMIN — SULFAMETHOXAZOLE AND TRIMETHOPRIM 1 TABLET: 400; 80 TABLET ORAL at 20:23

## 2022-11-27 RX ADMIN — AMIODARONE HYDROCHLORIDE 400 MG: 200 TABLET ORAL at 18:45

## 2022-11-27 RX ADMIN — DILTIAZEM HYDROCHLORIDE 10 MG: 5 INJECTION INTRAVENOUS at 11:14

## 2022-11-27 RX ADMIN — ONDANSETRON 4 MG: 2 INJECTION INTRAMUSCULAR; INTRAVENOUS at 11:13

## 2022-11-27 RX ADMIN — OMEPRAZOLE 40 MG: 20 CAPSULE, DELAYED RELEASE ORAL at 18:45

## 2022-11-27 RX ADMIN — MYCOPHENOLATE MOFETIL 1000 MG: 250 CAPSULE ORAL at 20:24

## 2022-11-27 RX ADMIN — MAGNESIUM SULFATE 2 G: 2 INJECTION INTRAVENOUS at 12:37

## 2022-11-27 RX ADMIN — APIXABAN 2.5 MG: 2.5 TABLET, FILM COATED ORAL at 18:45

## 2022-11-27 RX ADMIN — TACROLIMUS 1 MG: 1 CAPSULE ORAL at 18:45

## 2022-11-27 RX ADMIN — SODIUM CHLORIDE, POTASSIUM CHLORIDE, SODIUM LACTATE AND CALCIUM CHLORIDE: 600; 310; 30; 20 INJECTION, SOLUTION INTRAVENOUS at 11:17

## 2022-11-27 ASSESSMENT — COGNITIVE AND FUNCTIONAL STATUS - GENERAL
MOBILITY SCORE: 23
SUGGESTED CMS G CODE MODIFIER DAILY ACTIVITY: CH
DAILY ACTIVITIY SCORE: 24
SUGGESTED CMS G CODE MODIFIER MOBILITY: CI
CLIMB 3 TO 5 STEPS WITH RAILING: A LITTLE

## 2022-11-27 ASSESSMENT — LIFESTYLE VARIABLES
TOTAL SCORE: 0
DOES PATIENT WANT TO STOP DRINKING: NO
CONSUMPTION TOTAL: INCOMPLETE
EVER FELT BAD OR GUILTY ABOUT YOUR DRINKING: NO
ALCOHOL_USE: NO
HAVE PEOPLE ANNOYED YOU BY CRITICIZING YOUR DRINKING: NO
SUBSTANCE_ABUSE: 0
EVER HAD A DRINK FIRST THING IN THE MORNING TO STEADY YOUR NERVES TO GET RID OF A HANGOVER: NO
HAVE YOU EVER FELT YOU SHOULD CUT DOWN ON YOUR DRINKING: NO
TOTAL SCORE: 0
TOTAL SCORE: 0

## 2022-11-27 ASSESSMENT — HEART SCORE
HISTORY: HIGHLY SUSPICIOUS
TROPONIN: 1-3 TIMES NORMAL LIMIT
ECG: NON-SPECIFIC REPOLARIZATION DISTURBANCE
AGE: 65+
HEART SCORE: 8
RISK FACTORS: >2 RISK FACTORS OR HX OF ATHEROSCLEROTIC DISEASE

## 2022-11-27 ASSESSMENT — ENCOUNTER SYMPTOMS
VOMITING: 0
HEMOPTYSIS: 0
HALLUCINATIONS: 0
FLANK PAIN: 0
WEIGHT LOSS: 0
NECK PAIN: 0
DOUBLE VISION: 0
ORTHOPNEA: 0
BACK PAIN: 0
CHILLS: 0
TREMORS: 0
HEARTBURN: 0
PHOTOPHOBIA: 0
POLYDIPSIA: 0
SPUTUM PRODUCTION: 0
FEVER: 0
HEADACHES: 0
SPEECH CHANGE: 0
BRUISES/BLEEDS EASILY: 0
SHORTNESS OF BREATH: 1
NERVOUS/ANXIOUS: 0
COUGH: 0
NAUSEA: 0
BLURRED VISION: 0
FOCAL WEAKNESS: 0
PALPITATIONS: 1

## 2022-11-27 ASSESSMENT — FIBROSIS 4 INDEX
FIB4 SCORE: 1.943817289761740025
FIB4 SCORE: 1.943817289761740025
FIB4 SCORE: 2.95

## 2022-11-27 ASSESSMENT — PAIN DESCRIPTION - PAIN TYPE
TYPE: ACUTE PAIN;CHRONIC PAIN
TYPE: ACUTE PAIN

## 2022-11-27 NOTE — ED TRIAGE NOTES
Chief Complaint   Patient presents with    Chest Pain     L side chest pain that radiates to L side arm, started this AM around 0930; c/o nausea     BP (!) 93/55   Pulse 63   Temp 35.8 °C (96.5 °F) (Temporal)   Resp 20   Wt 63.7 kg (140 lb 6.9 oz)   LMP  (LMP Unknown)   SpO2 99%   BMI 27.43 kg/m²     Pt BIB family for above concern, per family no pt hx of AFIB, pt has hx of dialysis (stopped tx 1 week ago per MD order) and has received a R side kidney transplant 3 weeks ago

## 2022-11-27 NOTE — ED PROVIDER NOTES
ED Provider Note    CHIEF COMPLAINT  Chief Complaint   Patient presents with    Chest Pain     L side chest pain that radiates to L side arm, started this AM around 0930; c/o nausea       HPI  Tere Kenyetta Gallegos is a 71 y.o. female who presents complaining of left-sided chest pain that radiates to her left arm that started around 930 this morning.  She also has nausea.  She feels short of breath.  She does feel her heart palpitating.  She has never had the symptoms before.  The patient is 3 weeks status post kidney transplant in [place].  She is making urine.  She denies any fevers or chills cough or cold symptoms.  He has been taking her medications as directed.  She denies any smoking or alcohol or drug use.  She denies any history of heart attack but does have coronary artery with a stent in her right coronary artery listed in her past medical history.  She is diabetic.  She denies any fevers or productive cough.  She denies any sore throat or runny nose.  She has no diarrhea.  She is nauseated.  Currently the pain is 10/10.  Patient denies being on blood thinning medications however Eliquis is listed on her med sheet from July.    REVIEW OF SYSTEMS    HEENT:  No ear pain, congestion or sore throat   EYES: no discharge redness or vision changes  CARDIAC: Positive chest pain, and palpitations    PULMONARY: positive dyspnea, no cough or congestion   GI: no vomiting diarrhea or abdominal pain   : no dysuria, back pain or hematuria   Neuro: Positive generalized weakness, no numbness aphasia or headache  Musculoskeletal: no swelling deformity or pain no joint swelling  Endocrine: no fevers, sweating, weight loss   SKIN: no rash, erythema or contusions     See history of present illness all other systems are negative    PAST MEDICAL HISTORY  Past Medical History:   Diagnosis Date    Acquired hypothyroidism 05/04/2020    CAD (coronary artery disease)     Chronic diastolic heart failure (HCC) 05/04/2020     Coronary artery disease due to lipid rich plaque     2 Synergy NOEMI to 100% RCA stent placed    Dental disorder     partial dentures- uppers    Diabetes ([facility])     oral medication    ESRD (end stage renal disease) on dialysis ([facility]) 2020    Hemodialysis patient ([facility])     M, W, F    Hyperlipidemia     Hypertension     Kidney transplant candidate     Kidney transplant recipient 10/31/2022    Presence of drug-eluting stent in right coronary artery     QT prolongation 2020    RLS (restless legs syndrome) 2016    Transaminitis 2018       FAMILY HISTORY  Family History   Problem Relation Age of Onset    Diabetes Sister     Other Sister         liver disease    Diabetes Brother     Heart Disease Neg Hx        SOCIAL HISTORY  Social History     Socioeconomic History    Marital status:    Tobacco Use    Smoking status: Never    Smokeless tobacco: Never   Vaping Use    Vaping Use: Never used   Substance and Sexual Activity    Alcohol use: No     Alcohol/week: 0.0 oz    Drug use: No    Sexual activity: Never       SURGICAL HISTORY  Past Surgical History:   Procedure Laterality Date    OTHER ABDOMINAL SURGERY Right 10/31/2022     Donor kidney transplant - [facility]    Z CARDIAC CATH  2016    RCA stented with 2 Synergy drug-eluting stents.    RECOVERY  2016    Procedure: CATH LAB [facility] WITH POSSIBLE DR. CASTILLO;  Surgeon: [facility];  [place] [facility] [place]:     Z CARDIAC CATH  2016    100% RCA    OTHER Left 2014    left arm upper extremity fistula    OTHER ABDOMINAL SURGERY      left kidney removed due to cancer       CURRENT MEDICATIONS  Home Medications       Reviewed by Veronica Parra (Pharmacy Tech) on 22 at 1214  Med List Status: Complete     Medication Last Dose Status   acetaminophen (TYLENOL) 500 MG Tab > 4 days Active   Alcohol Swabs Unknown Active   apixaban (ELIQUIS) 2.5mg Tab 2022  Active   aspirin EC (ECOTRIN) 81 MG Tablet Delayed Response 11/27/2022 Active   atorvastatin (LIPITOR) 20 MG Tab 11/26/2022 Active   Blood Glucose Meter Kit Unknown Active   Blood Glucose Test Strips Unknown Active   carvedilol (COREG) 3.125 MG Tab 11/27/2022 Active   clotrimazole (MYCELEX) 10 MG Thanh thanh 11/27/2022 Active   HYDROcodone-acetaminophen (NORCO) 5-325 MG Tab per tablet 11/25/2022 Active   insulin aspart (NOVOLOG FLEXPEN) 100 UNIT/ML injection PEN 11/26/2022 Active   insulin glargine (LANTUS) 100 UNIT/ML SC SOLN 11/27/2022 Active   Lancets Unknown Active   levothyroxine (SYNTHROID) 50 MCG Tab 11/27/2022 Active   mycophenolate (CELLCEPT) 500 MG tablet 11/27/2022 Active   pantoprazole (PROTONIX) 40 MG Tablet Delayed Response 11/27/2022 Active   predniSONE (DELTASONE) 5 MG Tab 11/27/2022 Active   sulfamethoxazole-trimethoprim (BACTRIM) 400-80 MG Tab 11/27/2022 Active   tacrolimus (PROGRAF) 1 MG Cap 11/27/2022 Active   TRUE METRIX BLOOD GLUCOSE TEST strip Unknown Active   valGANciclovir (VALCYTE) 450 MG tablet 11/27/2022 Active                    ALLERGIES  No Known Allergies    PHYSICAL EXAM  VITAL SIGNS: /53   Pulse (!) 52   Temp 35.8 °C (96.5 °F) (Temporal)   Resp 16   Wt 63.7 kg (140 lb 6.9 oz)   LMP  (LMP Unknown)   SpO2 94%   BMI 27.43 kg/m²   Constitutional: Well developed, Well nourished, moaning and uncomfortable, Non-toxic appearance.   HEENT: Normocephalic, Atraumatic,  external ears normal, pharynx pink,  Mucous  Membranes moist, No rhinorrhea or mucosal edema  Eyes: PERRL, EOMI, Conjunctiva normal, No discharge.   Neck: Normal range of motion, No tenderness, Supple, No stridor.   Lymphatic: No lymphadenopathy    Cardiovascular: Tachycardic, irregularly irregular no murmurs rubs or gallops  Thorax & Lungs: Lungs clear to auscultation bilaterally, No respiratory distress, No wheezes, rhales or rhonchi, No chest wall tenderness.   Abdomen: Bowel sounds normal, Soft, non tender,  non distended,  No pulsatile masses., no rebound guarding or peritoneal signs.  Positive anterior right lower quadrant incisional scar clean dry and intact without erythema or drainage  Skin: Warm, Dry, No erythema, No rash,   Back:  No CVA tenderness,  No spinal tenderness, bony crepitance step offs or instability.   Extremities: Equal, intact distal pulses, No cyanosis, clubbing or edema,  No tenderness.   Musculoskeletal: Good range of motion in all major joints. No tenderness to palpation or major deformities noted.   Neurologic: Alert & oriented x 3,  No focal deficits noted.   Psychiatric: Affect normal, Judgment normal, Mood normal.       RADIOLOGY/PROCEDURES  CT-CTA CHEST PULMONARY ARTERY W/ RECONS   Final Result         1. No CT evidence of pulmonary embolism.      2. Minimal bibasilar atelectasis.      3. Grossly unchanged bilateral pulmonary nodules, measuring up to 7 mm   Low Risk: CT at 6-12 months, then consider CT at 18-24 months      High Risk: CT at 6-12 months, then CT at 18-24 months      Low Risk - Minimal or absent history of smoking and of other known risk factors.      High Risk - History of smoking or of other known risk factors.      Note: These recommendations do not apply to lung cancer screening, patients with immunosuppression, or patients with known primary cancer.      Fleischner Society 2017 Guidelines for Management of Incidentally Detected Pulmonary Nodules in Adults      DX-CHEST-PORTABLE (1 VIEW)   Final Result         Diffuse interstitial prominence could relate to mild fluid overload.      Hazy bibasilar atelectasis.            COURSE & MEDICAL DECISION MAKING  Pertinent Labs & Imaging studies reviewed. (See chart for details)  Differential diagnosis: Pulmonary embolism, A. fib with RVR with strain pattern, cardiac ischemia, lecture light disorder, thyroid dysfunction    HYDRATION: Based on the patient's presentation of Hypotension the patient was given IV fluids. IV Hydration  was used because oral hydration was not adequate alone. Upon recheck following hydration, the patient was improved.    An IV started and labs were drawn.  To give the patient 10 of diltiazem IV to slow her heart rate.  CTA of the chest was ordered due to high concern of pulmonary embolism with new onset A. fib status post surgery 3 weeks ago patient was given some morphine and Zofran for pain and IV fluids were started.      Results for orders placed or performed during the hospital encounter of 11/27/22   CBC with Differential   Result Value Ref Range    WBC 3.5 (L) 4.8 - 10.8 K/uL    RBC 3.10 (L) 4.20 - 5.40 M/uL    Hemoglobin 9.3 (L) 12.0 - 16.0 g/dL    Hematocrit 29.4 (L) 37.0 - 47.0 %    MCV 94.8 81.4 - 97.8 fL    MCH 30.0 27.0 - 33.0 pg    MCHC 31.6 (L) 33.6 - 35.0 g/dL    RDW 58.1 (H) 35.9 - 50.0 fL    Platelet Count 168 164 - 446 K/uL    MPV 10.7 9.0 - 12.9 fL    Neutrophils-Polys 82.60 (H) 44.00 - 72.00 %    Lymphocytes 11.00 (L) 22.00 - 41.00 %    Monocytes 5.20 0.00 - 13.40 %    Eosinophils 0.60 0.00 - 6.90 %    Basophils 0.30 0.00 - 1.80 %    Immature Granulocytes 0.30 0.00 - 0.90 %    Nucleated RBC 0.00 /100 WBC    Neutrophils (Absolute) 2.86 2.00 - 7.15 K/uL    Lymphs (Absolute) 0.38 (L) 1.00 - 4.80 K/uL    Monos (Absolute) 0.18 0.00 - 0.85 K/uL    Eos (Absolute) 0.02 0.00 - 0.51 K/uL    Baso (Absolute) 0.01 0.00 - 0.12 K/uL    Immature Granulocytes (abs) 0.01 0.00 - 0.11 K/uL    NRBC (Absolute) 0.00 K/uL   Complete Metabolic Panel (CMP)   Result Value Ref Range    Sodium 137 135 - 145 mmol/L    Potassium 4.7 3.6 - 5.5 mmol/L    Chloride 109 96 - 112 mmol/L    Co2 18 (L) 20 - 33 mmol/L    Anion Gap 10.0 7.0 - 16.0    Glucose 138 (H) 65 - 99 mg/dL    Bun 18 8 - 22 mg/dL    Creatinine 1.11 0.50 - 1.40 mg/dL    Calcium 10.2 8.4 - 10.2 mg/dL    AST(SGOT) 12 12 - 45 U/L    ALT(SGPT) 7 2 - 50 U/L    Alkaline Phosphatase 113 (H) 30 - 99 U/L    Total Bilirubin 0.3 0.1 - 1.5 mg/dL    Albumin 3.8 3.2 - 4.9 g/dL     Total Protein 6.2 6.0 - 8.2 g/dL    Globulin 2.4 1.9 - 3.5 g/dL    A-G Ratio 1.6 g/dL   Troponin STAT   Result Value Ref Range    Troponin T 32 (H) 6 - 19 ng/L   APTT   Result Value Ref Range    APTT 29.4 24.7 - 36.0 sec   Prothrombin Time (PT/INR)   Result Value Ref Range    PT 15.7 (H) 12.0 - 14.6 sec    INR 1.31 (H) 0.87 - 1.13   TSH (for screening thyroid dysfunction)   Result Value Ref Range    TSH 1.550 0.380 - 5.330 uIU/mL   Free Thyroxine (add to TSH for patients with existing thyroid dysfunction)   Result Value Ref Range    Free T-4 1.67 0.93 - 1.70 ng/dL   MAGNESIUM   Result Value Ref Range    Magnesium 1.3 (L) 1.5 - 2.5 mg/dL   URINALYSIS,CULTURE IF INDICATED    Specimen: Urine, Clean Catch   Result Value Ref Range    Color Yellow     Character Clear     Specific Gravity 1.010 <1.035    Ph 6.0 5.0 - 8.0    Glucose Negative Negative mg/dL    Ketones Negative Negative mg/dL    Protein Negative Negative mg/dL    Bilirubin Negative Negative    Nitrite Negative Negative    Leukocyte Esterase Negative Negative    Occult Blood Moderate (A) Negative    Micro Urine Req Microscopic    ESTIMATED GFR   Result Value Ref Range    GFR (CKD-EPI) 52 (A) >60 mL/min/1.73 m 2   URINE MICROSCOPIC (W/UA)   Result Value Ref Range    WBC 2-5 /hpf    RBC 10-20 (A) /hpf    Bacteria Few (A) None /hpf    Epithelial Cells Few Few /hpf   EKG   Result Value Ref Range    Report       [facility] Emergency Dept.    Test Date:  2022  Pt Name:    DIOR NICHOLS    Department: [facility]  MRN:        [id number]                      Room:  Gender:     Female                       Technician: SUSAN  :        1950                   Requested By:ER TRIAGE PROTOCOL  Order #:    [id number]                    Reading MD: SILVA HENDRICKS MD    Measurements  Intervals                                Axis  Rate:       144                          P:  HI:                                      QRS:        27  QRSD:        86                           T:          180  QT:         335  QTc:        519    Interpretive Statements  Atrial fibrillation  Repolarization abnormality, prob rate related  Prolonged QT interval  Baseline wander in lead(s) I,II,aVR,aVF,V1,V2,V5,V6  Compared to ECG 2022 06:31:16  Early repolarization now present  Ventricular premature complex(es) no longer present  ST (T wave) deviation no longer present  Electronically Sign ed On 2022 11:01:51 PST by SILVA HENDRICKS MD     EKG   Result Value Ref Range    Report       [facility] Emergency Dept.    Test Date:  2022  Pt Name:    DIOR NICHOLS    Department: EDS  MRN:        [id number]                      Room:  Gender:     Female                       Technician: [id number]  :        1950                   Requested By:SILVA HENDRICKS  Order #:    [id number]                    Reading MD: SILVA HENDRICKS MD    Measurements  Intervals                                Axis  Rate:       134                          P:  NM:                                      QRS:        47  QRSD:       86                           T:  QT:         320  QTc:        478    Interpretive Statements  Atrial fibrillation  Repol abnrm suggests ischemia, anterolateral  Compared to ECG 2022 10:27:43  Possible ischemia now present  Prolonged QT interval no longer present  Electronically Signed On 2022 13:15:42 PST by SILVA HENDRICKS MD     EKG   Result Value Ref Range    Report       [facility] Emergency Dept.    Test Date:  2022  Pt Name:    DIOR NICHOLS    Department: EDS  MRN:        [id number]                      [facility]  Gender:     Female                       Technician: [id number]  :        1950                   Requested By:SILVA HENDRICKS  Order #:    [id number]                    Reading MD: SILVA HENDRICKS MD    Measurements  Intervals                                 Axis  Rate:       52                           P:          -12  CT:         160                          QRS:        37  QRSD:       86                           T:          33  QT:         464  QTc:        432    Interpretive Statements  Sinus bradycardia  Consider left ventricular hypertrophy  Compared to ECG 11/27/2022 12:59:07  Atrial fibrillation no longer present  Early repolarization no longer present  Possible ischemia no longer present  Electronically Signed On 11- 13:23:48 PST by SILVA HENDRICKS MD          Heart score: 8    1:16 PM I spoke with the hospitalist Dr. Russo who given the patient's history believes she would be safest over at [facility] for admission for possible cardiac cath given her history of stent placement and elevated troponin with crushing chest pain.    1:16 PM I have spoken with the transfer center who will consult Dr. Levy for a direct admission later tonight.    1:16 PM patient cardioverted back into sinus bradycardia so the second diltiazem dose was ordered not given by her to her converting.  Did give her IV magnesium to replace her low magnesium level.    1:56 PM I spoke with Dr. Levy who accepts the patient in transfer for admission and cardiac evaluation.     Critical Care  Due to the real possibility of a deterioration of this patient's condition required the highest level of my preparedness for sudden emergent intervention. I provided critical care services which included medication orders, frequent reevaluations of the patient's condition and response to treatment, ordering and reviewing test results and discussing the case with various consultants. The critical care time associated with the care of the patient was 35 minutes. Review chart for interventions. This time is exclusive of any other billable procedures.     FINAL IMPRESSION  1. Chest pain, unspecified type    2. Atrial fibrillation with rapid ventricular response (HCC)    3. Elevated  troponin    4. Kidney transplant recipient           PLAN/DISPOSITION  Admitted in guarded condition          Electronically signed by: Juanita Caballero M.D., 11/27/2022 10:56 AM

## 2022-11-27 NOTE — ED NOTES
Per. Patients family lab work every Monday and Thursday patient gets lab work to monitor kidney function

## 2022-11-27 NOTE — ED NOTES
Med rec updated and complete, per pts son and RX bottles that were returned back to pts son at bedside.   Allergies reviewed, per pts son  Interviewed pt with family at bedside with permission from pt.  Pts son brought in pts RX bottles, Per son reports that pt is not taking RENVELA 800MG.  Pts son reports that he thinks that pt is to be on her BACTRIM 400-80MG 1 tablet once a day for 3 month course, pt started on 11/1/2022

## 2022-11-28 ENCOUNTER — APPOINTMENT (OUTPATIENT)
Dept: CARDIOLOGY | Facility: MEDICAL CENTER | Age: 72
DRG: 281 | End: 2022-11-28
Attending: STUDENT IN AN ORGANIZED HEALTH CARE EDUCATION/TRAINING PROGRAM
Payer: MEDICARE

## 2022-11-28 LAB
ALBUMIN SERPL BCP-MCNC: 3.6 G/DL (ref 3.2–4.9)
ALBUMIN/GLOB SERPL: 1.5 G/DL
ALP SERPL-CCNC: 113 U/L (ref 30–99)
ALT SERPL-CCNC: 7 U/L (ref 2–50)
ANION GAP SERPL CALC-SCNC: 10 MMOL/L (ref 7–16)
AST SERPL-CCNC: 15 U/L (ref 12–45)
BASOPHILS # BLD AUTO: 0.3 % (ref 0–1.8)
BASOPHILS # BLD: 0.01 K/UL (ref 0–0.12)
BILIRUB SERPL-MCNC: 0.3 MG/DL (ref 0.1–1.5)
BUN SERPL-MCNC: 19 MG/DL (ref 8–22)
CALCIUM SERPL-MCNC: 9.8 MG/DL (ref 8.5–10.5)
CHLORIDE SERPL-SCNC: 109 MMOL/L (ref 96–112)
CO2 SERPL-SCNC: 14 MMOL/L (ref 20–33)
CREAT SERPL-MCNC: 1.13 MG/DL (ref 0.5–1.4)
EKG IMPRESSION: NORMAL
EOSINOPHIL # BLD AUTO: 0 K/UL (ref 0–0.51)
EOSINOPHIL NFR BLD: 0 % (ref 0–6.9)
ERYTHROCYTE [DISTWIDTH] IN BLOOD BY AUTOMATED COUNT: 59.7 FL (ref 35.9–50)
GFR SERPLBLD CREATININE-BSD FMLA CKD-EPI: 51 ML/MIN/1.73 M 2
GLOBULIN SER CALC-MCNC: 2.4 G/DL (ref 1.9–3.5)
GLUCOSE BLD STRIP.AUTO-MCNC: 112 MG/DL (ref 65–99)
GLUCOSE BLD STRIP.AUTO-MCNC: 113 MG/DL (ref 65–99)
GLUCOSE BLD STRIP.AUTO-MCNC: 140 MG/DL (ref 65–99)
GLUCOSE BLD STRIP.AUTO-MCNC: 152 MG/DL (ref 65–99)
GLUCOSE BLD STRIP.AUTO-MCNC: 184 MG/DL (ref 65–99)
GLUCOSE BLD STRIP.AUTO-MCNC: 208 MG/DL (ref 65–99)
GLUCOSE SERPL-MCNC: 148 MG/DL (ref 65–99)
HCT VFR BLD AUTO: 28 % (ref 37–47)
HGB BLD-MCNC: 8.9 G/DL (ref 12–16)
IMM GRANULOCYTES # BLD AUTO: 0.01 K/UL (ref 0–0.11)
IMM GRANULOCYTES NFR BLD AUTO: 0.3 % (ref 0–0.9)
LV EJECT FRACT  99904: 60
LV EJECT FRACT MOD 2C 99903: 53.61
LV EJECT FRACT MOD 4C 99902: 59.16
LV EJECT FRACT MOD BP 99901: 56.7
LYMPHOCYTES # BLD AUTO: 0.26 K/UL (ref 1–4.8)
LYMPHOCYTES NFR BLD: 8.1 % (ref 22–41)
MCH RBC QN AUTO: 30.6 PG (ref 27–33)
MCHC RBC AUTO-ENTMCNC: 31.8 G/DL (ref 33.6–35)
MCV RBC AUTO: 96.2 FL (ref 81.4–97.8)
MONOCYTES # BLD AUTO: 0.11 K/UL (ref 0–0.85)
MONOCYTES NFR BLD AUTO: 3.4 % (ref 0–13.4)
NEUTROPHILS # BLD AUTO: 2.83 K/UL (ref 2–7.15)
NEUTROPHILS NFR BLD: 87.9 % (ref 44–72)
NRBC # BLD AUTO: 0 K/UL
NRBC BLD-RTO: 0 /100 WBC
PLATELET # BLD AUTO: 161 K/UL (ref 164–446)
PMV BLD AUTO: 11.5 FL (ref 9–12.9)
POTASSIUM SERPL-SCNC: 5.6 MMOL/L (ref 3.6–5.5)
POTASSIUM SERPL-SCNC: 5.9 MMOL/L (ref 3.6–5.5)
PROT SERPL-MCNC: 6 G/DL (ref 6–8.2)
RBC # BLD AUTO: 2.91 M/UL (ref 4.2–5.4)
SODIUM SERPL-SCNC: 133 MMOL/L (ref 135–145)
WBC # BLD AUTO: 3.2 K/UL (ref 4.8–10.8)

## 2022-11-28 PROCEDURE — 80053 COMPREHEN METABOLIC PANEL: CPT

## 2022-11-28 PROCEDURE — A9270 NON-COVERED ITEM OR SERVICE: HCPCS | Performed by: INTERNAL MEDICINE

## 2022-11-28 PROCEDURE — 85025 COMPLETE CBC W/AUTO DIFF WBC: CPT

## 2022-11-28 PROCEDURE — 93306 TTE W/DOPPLER COMPLETE: CPT

## 2022-11-28 PROCEDURE — 93005 ELECTROCARDIOGRAM TRACING: CPT | Performed by: INTERNAL MEDICINE

## 2022-11-28 PROCEDURE — 93010 ELECTROCARDIOGRAM REPORT: CPT | Performed by: INTERNAL MEDICINE

## 2022-11-28 PROCEDURE — 700105 HCHG RX REV CODE 258

## 2022-11-28 PROCEDURE — 93010 ELECTROCARDIOGRAM REPORT: CPT | Mod: 76 | Performed by: INTERNAL MEDICINE

## 2022-11-28 PROCEDURE — 99232 SBSQ HOSP IP/OBS MODERATE 35: CPT | Performed by: STUDENT IN AN ORGANIZED HEALTH CARE EDUCATION/TRAINING PROGRAM

## 2022-11-28 PROCEDURE — 700102 HCHG RX REV CODE 250 W/ 637 OVERRIDE(OP): Performed by: INTERNAL MEDICINE

## 2022-11-28 PROCEDURE — 82962 GLUCOSE BLOOD TEST: CPT | Mod: 91

## 2022-11-28 PROCEDURE — 770020 HCHG ROOM/CARE - TELE (206)

## 2022-11-28 PROCEDURE — 700111 HCHG RX REV CODE 636 W/ 250 OVERRIDE (IP): Performed by: STUDENT IN AN ORGANIZED HEALTH CARE EDUCATION/TRAINING PROGRAM

## 2022-11-28 PROCEDURE — 99232 SBSQ HOSP IP/OBS MODERATE 35: CPT | Mod: GC | Performed by: INTERNAL MEDICINE

## 2022-11-28 PROCEDURE — 36415 COLL VENOUS BLD VENIPUNCTURE: CPT

## 2022-11-28 PROCEDURE — 93306 TTE W/DOPPLER COMPLETE: CPT | Mod: 26 | Performed by: INTERNAL MEDICINE

## 2022-11-28 PROCEDURE — 700111 HCHG RX REV CODE 636 W/ 250 OVERRIDE (IP): Performed by: INTERNAL MEDICINE

## 2022-11-28 PROCEDURE — 84132 ASSAY OF SERUM POTASSIUM: CPT

## 2022-11-28 PROCEDURE — 99223 1ST HOSP IP/OBS HIGH 75: CPT | Performed by: INTERNAL MEDICINE

## 2022-11-28 RX ORDER — LOSARTAN POTASSIUM 25 MG/1
25 TABLET ORAL
Status: DISCONTINUED | OUTPATIENT
Start: 2022-11-28 | End: 2022-11-28

## 2022-11-28 RX ORDER — LABETALOL HYDROCHLORIDE 5 MG/ML
10 INJECTION, SOLUTION INTRAVENOUS EVERY 4 HOURS PRN
Status: DISCONTINUED | OUTPATIENT
Start: 2022-11-28 | End: 2022-11-28

## 2022-11-28 RX ORDER — AMIODARONE HYDROCHLORIDE 200 MG/1
200 TABLET ORAL
Status: DISCONTINUED | OUTPATIENT
Start: 2022-11-29 | End: 2022-11-29 | Stop reason: HOSPADM

## 2022-11-28 RX ORDER — HYDRALAZINE HYDROCHLORIDE 20 MG/ML
20 INJECTION INTRAMUSCULAR; INTRAVENOUS EVERY 6 HOURS PRN
Status: DISCONTINUED | OUTPATIENT
Start: 2022-11-28 | End: 2022-11-29 | Stop reason: HOSPADM

## 2022-11-28 RX ORDER — SODIUM BICARBONATE 650 MG/1
1300 TABLET ORAL 3 TIMES DAILY
Status: DISCONTINUED | OUTPATIENT
Start: 2022-11-28 | End: 2022-11-29 | Stop reason: HOSPADM

## 2022-11-28 RX ORDER — METOPROLOL SUCCINATE 25 MG/1
12.5 TABLET, EXTENDED RELEASE ORAL
Status: DISCONTINUED | OUTPATIENT
Start: 2022-11-28 | End: 2022-11-28

## 2022-11-28 RX ADMIN — DEXTROSE MONOHYDRATE 25 G: 100 INJECTION, SOLUTION INTRAVENOUS at 05:39

## 2022-11-28 RX ADMIN — SODIUM BICARBONATE 1300 MG: 650 TABLET ORAL at 16:12

## 2022-11-28 RX ADMIN — CLOTRIMAZOLE 10 MG: 10 LOZENGE ORAL; TOPICAL at 18:00

## 2022-11-28 RX ADMIN — SODIUM BICARBONATE 1300 MG: 650 TABLET ORAL at 10:53

## 2022-11-28 RX ADMIN — TACROLIMUS 1 MG: 1 CAPSULE ORAL at 18:00

## 2022-11-28 RX ADMIN — ATORVASTATIN CALCIUM 20 MG: 20 TABLET, FILM COATED ORAL at 21:13

## 2022-11-28 RX ADMIN — MYCOPHENOLATE MOFETIL 1000 MG: 250 CAPSULE ORAL at 06:08

## 2022-11-28 RX ADMIN — OMEPRAZOLE 40 MG: 20 CAPSULE, DELAYED RELEASE ORAL at 06:08

## 2022-11-28 RX ADMIN — INSULIN HUMAN 2 UNITS: 100 INJECTION, SOLUTION PARENTERAL at 13:24

## 2022-11-28 RX ADMIN — MAGNESIUM HYDROXIDE 30 ML: 400 SUSPENSION ORAL at 05:48

## 2022-11-28 RX ADMIN — SODIUM BICARBONATE 1300 MG: 650 TABLET ORAL at 21:13

## 2022-11-28 RX ADMIN — CLOTRIMAZOLE 10 MG: 10 LOZENGE ORAL; TOPICAL at 00:53

## 2022-11-28 RX ADMIN — VALGANCICLOVIR 450 MG: 450 TABLET, FILM COATED ORAL at 06:08

## 2022-11-28 RX ADMIN — TACROLIMUS 1 MG: 1 CAPSULE ORAL at 06:12

## 2022-11-28 RX ADMIN — ASPIRIN 325 MG: 325 TABLET ORAL at 06:08

## 2022-11-28 RX ADMIN — SULFAMETHOXAZOLE AND TRIMETHOPRIM 1 TABLET: 400; 80 TABLET ORAL at 06:08

## 2022-11-28 RX ADMIN — AMIODARONE HYDROCHLORIDE 400 MG: 200 TABLET ORAL at 06:00

## 2022-11-28 RX ADMIN — LEVOTHYROXINE SODIUM 50 MCG: 0.05 TABLET ORAL at 06:08

## 2022-11-28 RX ADMIN — CLOTRIMAZOLE 10 MG: 10 LOZENGE ORAL; TOPICAL at 06:07

## 2022-11-28 RX ADMIN — INSULIN HUMAN 1 UNITS: 100 INJECTION, SOLUTION PARENTERAL at 06:33

## 2022-11-28 RX ADMIN — PREDNISONE 10 MG: 10 TABLET ORAL at 06:08

## 2022-11-28 RX ADMIN — CLOTRIMAZOLE 10 MG: 10 LOZENGE ORAL; TOPICAL at 13:03

## 2022-11-28 RX ADMIN — HYDRALAZINE HYDROCHLORIDE 20 MG: 20 INJECTION INTRAMUSCULAR; INTRAVENOUS at 10:07

## 2022-11-28 RX ADMIN — MYCOPHENOLATE MOFETIL 1000 MG: 250 CAPSULE ORAL at 18:00

## 2022-11-28 RX ADMIN — OXYCODONE 2.5 MG: 5 TABLET ORAL at 09:05

## 2022-11-28 RX ADMIN — APIXABAN 5 MG: 5 TABLET, FILM COATED ORAL at 18:00

## 2022-11-28 RX ADMIN — ONDANSETRON 4 MG: 2 INJECTION INTRAMUSCULAR; INTRAVENOUS at 09:02

## 2022-11-28 RX ADMIN — APIXABAN 2.5 MG: 2.5 TABLET, FILM COATED ORAL at 06:08

## 2022-11-28 ASSESSMENT — PAIN DESCRIPTION - PAIN TYPE
TYPE: ACUTE PAIN;CHRONIC PAIN
TYPE: ACUTE PAIN

## 2022-11-28 ASSESSMENT — ENCOUNTER SYMPTOMS
SHORTNESS OF BREATH: 0
ABDOMINAL PAIN: 1
ENDOCRINE NEGATIVE: 1
EYE PAIN: 0
FACIAL ASYMMETRY: 0
CHILLS: 0
MYALGIAS: 0
HEADACHES: 0
HEMATOLOGIC/LYMPHATIC NEGATIVE: 1
NUMBNESS: 0
VOMITING: 0
CHEST TIGHTNESS: 0
DIZZINESS: 0
FATIGUE: 0
BRUISES/BLEEDS EASILY: 0
FEVER: 0
NERVOUS/ANXIOUS: 0
VOMITING: 1
NAUSEA: 0
EYES NEGATIVE: 1
MUSCULOSKELETAL NEGATIVE: 1
EYE DISCHARGE: 0
WEAKNESS: 0
SPEECH DIFFICULTY: 0
TROUBLE SWALLOWING: 0
NAUSEA: 1
CHEST TIGHTNESS: 1
ABDOMINAL PAIN: 0
COUGH: 0
PALPITATIONS: 0
LIGHT-HEADEDNESS: 0
PALPITATIONS: 1
BLOOD IN STOOL: 0
PSYCHIATRIC NEGATIVE: 1
WHEEZING: 0

## 2022-11-28 ASSESSMENT — CHA2DS2 SCORE
VASCULAR DISEASE: NO
AGE 75 OR GREATER: NO
AGE 65 TO 74: YES
HYPERTENSION: NO
CHA2DS2 VASC SCORE: 4
PRIOR STROKE OR TIA OR THROMBOEMBOLISM: NO
CHF OR LEFT VENTRICULAR DYSFUNCTION: YES
DIABETES: YES
SEX: FEMALE

## 2022-11-28 NOTE — ED NOTES
ERP at BS to talk to family and pt regarding tx to [facility].

## 2022-11-28 NOTE — PROGRESS NOTES
[facility]PITAL[facility] TRIAGE OFFICER DIRECT ADMISSION REPORT    Transferring facility: [facility]  Transferring physician: Dr Juanita Caballero M.D.    Transferring facility/physician contact number: [phone]    Chief complaint: Atrial fibrillation with RVR, NSTEMI, chest pain    Pertinent history & patient course:   This is a 69-year-old woman with a history of recent renal transplant 3 weeks ago at [facility].  She also has a history of coronary artery disease with stent placement to the right coronary artery.  She presented to the emergency room with right-sided chest pain radiating down the right arm.  She was found to be in atrial fibrillation with RVR.  Initial troponin was 32.  With the second troponin being 39.  There was transient ST depression in the anterolateral leads.  The ER physician discussed the case with Dr. Russo of cardiology, that was concerning about NSTEMI and recommended transferring the patient for a left heart catheterization.    Pertinent imaging & lab results:   Code Status: Full  per transferring provider, I personally verified with the transferring provider patient's code status and the transferring provider has confirmed this with the patient.    Further work up or recommendations per triage officer prior to transfer: None  Consultants called prior to transfer and pertinent input from consultants: Cardiology, Dr. Russo per Dr. Caballero    Patient accepted for transfer: Yes  Consultants to be called upon arrival: Dr. Russo, cardiology    Admission status: Inpatient.     Floor requested: Telemetry    If ICU transfer, name of intensivist case discussed with and pertinent input from critical care: None    Please inform the triage officer upon arrival of the patient to [facility] for assignment of a hospitalist to perform admission.     For any question or concerns regarding the care of this patient, please reach out to the assigned hospitalist.

## 2022-11-28 NOTE — H&P
Hospital Medicine History & Physical Note    Date of Service  11/27/2022    Primary Care Physician  ANGELITA Concepcion    Consultants  Cardiology    Code Status  Full Code    Chief Complaint  Chest pain, palpitation    History of Presenting Illness  Tere Gallegos is a 71 y.o. female with past medical history of kidney transplantation 3 weeks ago on immunosuppressants, paroxysmal A. fib on Eliquis, CAD, stent to RCA placement in 2016 type 2 diabetes, heart failure with preserved ejection fraction, hypothyroidism, restless leg syndrome, who presented 11/27/2022 .  Patient transferred from [facility] with paroxysmal A. fib and NSTEMI.  She presented to outside facility complaining of left-sided chest pain and left arm heaviness that started at 9:30 AM with associated nausea, shortness of breath and palpitation.  Patient was treated for A. fib with RVR with 10 mg of diltiazem which cardioverted.  When I saw the patient she was still complaining of some left sided chest pain that she could not characterize very well, 4 out of 10.  On EKG she has sinus bradycardia 50 beats per minutes with some ST elevation and standard III-lead does not meet STEMI criteria.  I discussed case with Dr. Ly who agreed to consult.    I discussed the plan of care with patient.    Review of Systems  Review of Systems   Constitutional:  Negative for chills, fever and weight loss.   HENT:  Negative for ear pain, hearing loss and tinnitus.    Eyes:  Negative for blurred vision, double vision and photophobia.   Respiratory:  Positive for shortness of breath. Negative for cough, hemoptysis and sputum production.    Cardiovascular:  Positive for chest pain and palpitations. Negative for orthopnea.   Gastrointestinal:  Negative for heartburn, nausea and vomiting.   Genitourinary:  Negative for dysuria, flank pain, frequency and hematuria.   Musculoskeletal:  Positive for joint pain. Negative for back pain and neck pain.   Skin:   Negative for itching and rash.   Neurological:  Negative for tremors, speech change, focal weakness and headaches.   Endo/Heme/Allergies:  Negative for environmental allergies and polydipsia. Does not bruise/bleed easily.   Psychiatric/Behavioral:  Negative for hallucinations and substance abuse. The patient is not nervous/anxious.      Past Medical History   has a past medical history of Acquired hypothyroidism (05/04/2020), CAD (coronary artery disease), Chronic diastolic heart failure ([facility]) (05/04/2020), Coronary artery disease due to lipid rich plaque, Dental disorder, Diabetes ([facility]), ESRD (end stage renal disease) on dialysis ([facility]) (05/04/2020), Hemodialysis patient ([facility]), Hyperlipidemia, Hypertension, Kidney transplant candidate, Kidney transplant recipient (10/31/2022), Presence of drug-eluting stent in right coronary artery, QT prolongation (01/22/2020), RLS (restless legs syndrome) (08/05/2016), and Transaminitis (12/22/2018).    Surgical History   has a past surgical history that includes recovery (08/16/2016); other abdominal surgery; other (Left, 2014); zzz cardiac cath (08/16/2016); zzz cardiac cath (09/07/2016); and other abdominal surgery (Right, 10/31/2022).     Family History  family history includes Diabetes in her brother and sister; Other in her sister.   Family history reviewed with patient. There is no family history that is pertinent to the chief complaint.     Social History   reports that she has never smoked. She has never used smokeless tobacco. She reports that she does not drink alcohol and does not use drugs.    Allergies  No Known Allergies    Medications  Prior to Admission Medications   Prescriptions Last Dose Informant Patient Reported? Taking?   Alcohol Swabs  Family Member No No   Sig: Wipe site with prep pad prior to injection.   Blood Glucose Meter Kit  Family Member No No   Sig: Test blood sugar as recommended by provider. Freestyle Pearl blood glucose monitoring kit.   Blood  Glucose Test Strips  Family Member No No   Sig: Use one Freestyle Pearl strip to test blood sugar once daily .   HYDROcodone-acetaminophen (NORCO) 5-325 MG Tab per tablet  Rx Bottle (For Med Information) Yes No   Sig: Take 1 Tablet by mouth every 6 hours as needed. Indications: Pain   Lancets  Family Member No No   Sig: Use one Freestyle Pearl lancet to test blood sugar once daily .   TRUE METRIX BLOOD GLUCOSE TEST strip  Rx Bottle (For Med Information) No No   Sig: TEST DAILY   acetaminophen (TYLENOL) 500 MG Tab  Family Member Yes No   Sig: Take 500-1,000 mg by mouth every 6 hours as needed. Indications: Pain   apixaban (ELIQUIS) 2.5mg Tab  Rx Bottle (For Med Information) No No   Sig: Take 1 Tablet by mouth 2 times a day. Indications: Thromboembolism secondary to Atrial Fibrillation   aspirin EC (ECOTRIN) 81 MG Tablet Delayed Response  Rx Bottle (For Med Information) Yes No   Sig: Take 1 Tablet by mouth every day.   atorvastatin (LIPITOR) 20 MG Tab  Rx Bottle (For Med Information) Yes No   Sig: Take 1 Tablet by mouth every evening.   carvedilol (COREG) 3.125 MG Tab  Rx Bottle (For Med Information) Yes No   Sig: Take 1 Tablet by mouth 2 times a day with meals.   clotrimazole (MYCELEX) 10 MG Thanh thanh  Rx Bottle (For Med Information) Yes No   Sig: Take 1 Thanh by mouth 4 times a day.   insulin aspart (NOVOLOG FLEXPEN) 100 UNIT/ML injection PEN  Rx Bottle (For Med Information) Yes No   Sig: Inject 2-8 Units under the skin 3 times a day before meals. Sliding Scale   insulin glargine (LANTUS) 100 UNIT/ML SC SOLN  Rx Bottle (For Med Information) Yes No   Sig: Inject 8 Units under the skin every day.   levothyroxine (SYNTHROID) 50 MCG Tab  Rx Bottle (For Med Information) No No   Sig: TOME FERNANDO TABLETA POR VIA ORAL CADA MANANA ON AN EMPTY STOMACH   Patient taking differently: Take 50 mcg by mouth every morning on an empty stomach. TOME FERNANDO TABLETA POR VIA ORAL CADA MANANA ON AN EMPTY STOMACH  Per pharmacy pt last  filled on 8/26/2022 for 90 day supply   mycophenolate (CELLCEPT) 500 MG tablet  Rx Bottle (For Med Information) Yes No   Sig: Take 2 Tablets by mouth 2 times a day.   pantoprazole (PROTONIX) 40 MG Tablet Delayed Response  Patient, Rx Bottle (For Med Information) Yes No   Sig: Take 1 Tablet by mouth every day.   predniSONE (DELTASONE) 5 MG Tab  Patient, Rx Bottle (For Med Information) Yes No   Sig: Take 2 Tablets by mouth every day.   sulfamethoxazole-trimethoprim (BACTRIM) 400-80 MG Tab  Patient, Rx Bottle (For Med Information) Yes No   Sig: Take 1 Tablet by mouth every day. Pt started on 11/1/2022 for 3 month course, per pts son   tacrolimus (PROGRAF) 1 MG Cap  Patient, Rx Bottle (For Med Information) Yes No   Sig: Take 1 Capsule by mouth 2 times a day.   valGANciclovir (VALCYTE) 450 MG tablet  Rx Bottle (For Med Information) No No   Sig: Take 1 Tablet by mouth every Wednesday. 1 tablet every Wednesday and Friday   Patient taking differently: Take 450 mg by mouth every day.      Facility-Administered Medications: None       Physical Exam  Temp:  [35.8 °C (96.5 °F)-36.6 °C (97.9 °F)] 36.6 °C (97.9 °F)  Pulse:  [] 54  Resp:  [14-20] 16  BP: ()/(51-78) 130/52  SpO2:  [94 %-100 %] 97 %  Blood Pressure : 130/52   Temperature: 36.6 °C (97.9 °F)   Pulse: (!) 54   Respiration: 16   Pulse Oximetry: 97 %       Physical Exam  Vitals and nursing note reviewed.   Constitutional:       General: She is not in acute distress.     Appearance: Normal appearance.   HENT:      Head: Normocephalic and atraumatic.      Nose: Nose normal.      Mouth/Throat:      Mouth: Mucous membranes are moist.   Eyes:      Extraocular Movements: Extraocular movements intact.      Pupils: Pupils are equal, round, and reactive to light.   Cardiovascular:      Rate and Rhythm: Normal rate and regular rhythm.   Pulmonary:      Effort: Pulmonary effort is normal.      Breath sounds: Normal breath sounds.   Abdominal:      General: Abdomen is  flat. There is no distension.      Tenderness: There is no abdominal tenderness.   Musculoskeletal:         General: No swelling or deformity. Normal range of motion.      Cervical back: Normal range of motion and neck supple.   Skin:     General: Skin is warm and dry.   Neurological:      General: No focal deficit present.      Mental Status: She is alert and oriented to person, place, and time.   Psychiatric:         Mood and Affect: Mood normal.         Behavior: Behavior normal.       Laboratory:  Recent Labs     11/27/22  1102   WBC 3.5*   RBC 3.10*   HEMOGLOBIN 9.3*   HEMATOCRIT 29.4*   MCV 94.8   MCH 30.0   MCHC 31.6*   RDW 58.1*   PLATELETCT 168   MPV 10.7     Recent Labs     11/27/22  1102   SODIUM 137   POTASSIUM 4.7   CHLORIDE 109   CO2 18*   GLUCOSE 138*   BUN 18   CREATININE 1.11   CALCIUM 10.2     Recent Labs     11/27/22  1102   ALTSGPT 7   ASTSGOT 12   ALKPHOSPHAT 113*   TBILIRUBIN 0.3   GLUCOSE 138*     Recent Labs     11/27/22  1102   APTT 29.4   INR 1.31*     No results for input(s): NTPROBNP in the last 72 hours.      Recent Labs     11/27/22  1102   TROPONINT 32*       Imaging:  No orders to display         Assessment/Plan:  Justification for Admission Status  I anticipate this patient will require at least two midnights for appropriate medical management, necessitating inpatient admission because NSTEMI    Patient will need a Telemetry bed on CARDIOLOGY service .  The need is secondary to non-STEMI.    NSTEMI (non-ST elevated myocardial infarction) (HCC)  Assessment & Plan  ?  Type II NSTEMI secondary to paroxysm of A. fib with RVR  Troponin elevated: 33, 38  EKG did not show STEMI  Cardiology consulted, appreciate recommendations  Echo is pending    Paroxysmal A-fib (HCC)- (present on admission)  Assessment & Plan  Cardioverted after 10 mg of diltiazem IV  Started on amiodarone per cardiology  Continue apixaban  Monitor on telemetry    Acquired hypothyroidism- (present on  admission)  Assessment & Plan  Resume levothyroxine    Chronic diastolic heart failure (HCC)- (present on admission)  Assessment & Plan  Not in exacerbation  Monitor    Controlled type 2 diabetes mellitus with chronic kidney disease on chronic dialysis, without long-term current use of insulin (HCC)- (present on admission)  Assessment & Plan  Continue insulin sliding scale  Will hold Lantus    S/p cadaver renal transplant- (present on admission)  Assessment & Plan  Continue immunosuppressants CellCept, tacrolimus,, prophylactic Bactrim, Valcyte, clotrimazole.            VTE prophylaxis: therapeutic anticoagulation with apixaban

## 2022-11-28 NOTE — CONSULTS
Electrophysiology Initial Consultation    Date of Service  11/27/2022    Referring Physician  Pasha Levy M.D.    Reason for Consultation  Atrial fibrillation with RVR and chest pain    History of Presenting Illness  Tere Gallegos is a 71 y.o. female with a past medical history of RCA stents in 2016 who presented 11/27/2022 with atrial fibrillation with RVR.  Previous known paroxysmal atrial fibrillation.  Previous history of renal cell carcinoma status post nephrectomy.  End-stage renal disease with hemodialysis secondary to diabetes and hypertension.  Status post transplant kidney recently.  At [facility] in [place].  Came in with palpitations and some chest pain.  Once palpitations stopped chest pain went away.  Minimally elevated troponin.  Currently without any discomfort.  History is obtained through a  and then through her son.  Denies smoking or alcohol use.  Lives with her son.  He is .  Does not work.  Normal TFTs    Review of Systems  Review of Systems   Constitutional:  Negative for chills and fever.   HENT:  Negative for congestion.    Eyes:  Negative for pain and discharge.   Respiratory:  Negative for cough and wheezing.    Cardiovascular:  Positive for chest pain and palpitations.   Gastrointestinal:  Negative for abdominal pain, nausea and vomiting.   Musculoskeletal:  Negative for myalgias.   Skin:  Negative for rash.   Hematological:  Does not bruise/bleed easily.   Psychiatric/Behavioral:  The patient is not nervous/anxious.    All other systems reviewed and are negative.    Past Medical History   has a past medical history of Acquired hypothyroidism (05/04/2020), CAD (coronary artery disease), Chronic diastolic heart failure (HCC) (05/04/2020), Coronary artery disease due to lipid rich plaque, Dental disorder, Diabetes ([facility]), ESRD (end stage renal disease) on dialysis ([facility]) (05/04/2020), Hemodialysis patient ([facility]), Hyperlipidemia, Hypertension, Kidney  transplant candidate, Kidney transplant recipient (10/31/2022), Presence of drug-eluting stent in right coronary artery, QT prolongation (01/22/2020), RLS (restless legs syndrome) (08/05/2016), and Transaminitis (12/22/2018).    Surgical History   has a past surgical history that includes recovery (08/16/2016); other abdominal surgery; other (Left, 2014); zzz cardiac cath (08/16/2016); zzz cardiac cath (09/07/2016); and other abdominal surgery (Right, 10/31/2022).    Family History  family history includes Diabetes in her brother and sister; Other in her sister.    Social History   reports that she has never smoked. She has never used smokeless tobacco. She reports that she does not drink alcohol and does not use drugs.    Medications  Prior to Admission Medications   Prescriptions Last Dose Informant Patient Reported? Taking?   Alcohol Swabs  Family Member No No   Sig: Wipe site with prep pad prior to injection.   Blood Glucose Meter Kit  Family Member No No   Sig: Test blood sugar as recommended by provider. Freestyle Pearl blood glucose monitoring kit.   Blood Glucose Test Strips  Family Member No No   Sig: Use one Freestyle Pearl strip to test blood sugar once daily .   HYDROcodone-acetaminophen (NORCO) 5-325 MG Tab per tablet  Rx Bottle (For Med Information) Yes No   Sig: Take 1 Tablet by mouth every 6 hours as needed. Indications: Pain   Lancets  Family Member No No   Sig: Use one Freestyle Pearl lancet to test blood sugar once daily .   TRUE METRIX BLOOD GLUCOSE TEST strip  Rx Bottle (For Med Information) No No   Sig: TEST DAILY   acetaminophen (TYLENOL) 500 MG Tab  Family Member Yes No   Sig: Take 500-1,000 mg by mouth every 6 hours as needed. Indications: Pain   apixaban (ELIQUIS) 2.5mg Tab  Rx Bottle (For Med Information) No No   Sig: Take 1 Tablet by mouth 2 times a day. Indications: Thromboembolism secondary to Atrial Fibrillation   aspirin EC (ECOTRIN) 81 MG Tablet Delayed Response  Rx Bottle (For Med  Information) Yes No   Sig: Take 1 Tablet by mouth every day.   atorvastatin (LIPITOR) 20 MG Tab  Rx Bottle (For Med Information) Yes No   Sig: Take 1 Tablet by mouth every evening.   carvedilol (COREG) 3.125 MG Tab  Rx Bottle (For Med Information) Yes No   Sig: Take 1 Tablet by mouth 2 times a day with meals.   clotrimazole (MYCELEX) 10 MG Thanh thanh  Rx Bottle (For Med Information) Yes No   Sig: Take 1 Thanh by mouth 4 times a day.   insulin aspart (NOVOLOG FLEXPEN) 100 UNIT/ML injection PEN  Rx Bottle (For Med Information) Yes No   Sig: Inject 2-8 Units under the skin 3 times a day before meals. Sliding Scale   insulin glargine (LANTUS) 100 UNIT/ML SC SOLN  Rx Bottle (For Med Information) Yes No   Sig: Inject 8 Units under the skin every day.   levothyroxine (SYNTHROID) 50 MCG Tab  Rx Bottle (For Med Information) No No   Sig: TOME FERNANDO TABLETA POR VIA ORAL CADA MANANA ON AN EMPTY STOMACH   Patient taking differently: Take 50 mcg by mouth every morning on an empty stomach. TOME FERNANDO TABLETA POR VIA ORAL CADA MANANA ON AN EMPTY STOMACH  Per pharmacy pt last filled on 8/26/2022 for 90 day supply   mycophenolate (CELLCEPT) 500 MG tablet  Rx Bottle (For Med Information) Yes No   Sig: Take 2 Tablets by mouth 2 times a day.   pantoprazole (PROTONIX) 40 MG Tablet Delayed Response  Patient, Rx Bottle (For Med Information) Yes No   Sig: Take 1 Tablet by mouth every day.   predniSONE (DELTASONE) 5 MG Tab  Patient, Rx Bottle (For Med Information) Yes No   Sig: Take 2 Tablets by mouth every day.   sulfamethoxazole-trimethoprim (BACTRIM) 400-80 MG Tab  Patient, Rx Bottle (For Med Information) Yes No   Sig: Take 1 Tablet by mouth every day. Pt started on 11/1/2022 for 3 month course, per pts son   tacrolimus (PROGRAF) 1 MG Cap  Patient, Rx Bottle (For Med Information) Yes No   Sig: Take 1 Capsule by mouth 2 times a day.   valGANciclovir (VALCYTE) 450 MG tablet  Rx Bottle (For Med Information) No No   Sig: Take 1 Tablet by  mouth every Wednesday. 1 tablet every Wednesday and Friday   Patient taking differently: Take 450 mg by mouth every day.      Facility-Administered Medications: None       Allergies  No Known Allergies    Vital signs in last 24 hours  Temp:  [36.5 °C (97.7 °F)-36.6 °C (97.9 °F)] 36.5 °C (97.7 °F)  Pulse:  [54] 54  Resp:  [16] 16  BP: (130)/(52) 130/52  SpO2:  [97 %] 97 %    Physical Exam  Physical Exam  Vitals reviewed.   Constitutional:       General: She is not in acute distress.     Appearance: She is well-developed. She is not diaphoretic.   Eyes:      Conjunctiva/sclera: Conjunctivae normal.   Neck:      Thyroid: No thyroid mass or thyromegaly.      Vascular: No JVD.      Trachea: No tracheal deviation.   Cardiovascular:      Rate and Rhythm: Normal rate and regular rhythm.      Heart sounds: Murmur heard.      Comments: Left arm AV fistula  Pulmonary:      Effort: Pulmonary effort is normal. No respiratory distress.      Breath sounds: Normal breath sounds.   Chest:      Chest wall: No tenderness.   Abdominal:      Palpations: Abdomen is soft.      Tenderness: There is no abdominal tenderness.   Musculoskeletal:         General: Normal range of motion.   Skin:     General: Skin is warm and dry.   Neurological:      Mental Status: She is alert and oriented to person, place, and time.      Motor: No tremor.   Psychiatric:         Behavior: Behavior normal.       Lab Review  Lab Results   Component Value Date/Time    WBC 3.5 (L) 11/27/2022 11:02 AM    RBC 3.10 (L) 11/27/2022 11:02 AM    HEMOGLOBIN 9.3 (L) 11/27/2022 11:02 AM    HEMATOCRIT 29.4 (L) 11/27/2022 11:02 AM    MCV 94.8 11/27/2022 11:02 AM    MCH 30.0 11/27/2022 11:02 AM    MCHC 31.6 (L) 11/27/2022 11:02 AM    MPV 10.7 11/27/2022 11:02 AM      Lab Results   Component Value Date/Time    SODIUM 137 11/27/2022 11:02 AM    POTASSIUM 4.7 11/27/2022 11:02 AM    CHLORIDE 109 11/27/2022 11:02 AM    CO2 18 (L) 11/27/2022 11:02 AM    GLUCOSE 138 (H) 11/27/2022  11:02 AM    BUN 18 11/27/2022 11:02 AM    CREATININE 1.11 11/27/2022 11:02 AM    BUNCREATRAT 8 (L) 01/28/2021 07:00 AM      Lab Results   Component Value Date/Time    ASTSGOT 12 11/27/2022 11:02 AM    ALTSGPT 7 11/27/2022 11:02 AM     Lab Results   Component Value Date/Time    CHOLSTRLTOT 154 10/26/2022 09:54 AM    LDL 65 10/26/2022 09:54 AM    HDL 36 (A) 10/26/2022 09:54 AM    TRIGLYCERIDE 266 (H) 10/26/2022 09:54 AM    TROPONINT 39 (H) 11/27/2022 05:46 PM       No results for input(s): NTPROBNP in the last 72 hours.    Cardiac Imaging and Procedures Review  EKG:  My personal interpretation of the EKG dated 11/27/2022 is sinus bradycardia.  Unremarkable.  Previous EKG showed atrial fibrillation with RVR.  Echocardiogram: 8/24/2022  CONCLUSIONS  Compared to the prior study on 06/04/21.   Normal left ventricular size and systolic function.  The left ventricular ejection fraction is visually estimated to be 65%.  Normal inferior vena cava size and inspiratory collapse.  Estimated right ventricular systolic pressure is 25 mmHg.     Cardiac Catheterization: 6/8/2021  REFERRING PHYSICIAN: Dr. Borjas and Dr. Hernandez     PREOPERATIVE DIAGNOSIS:  1.  ESRD pending renal transplantation  2.  Prerenal transplant evaluation  3.  CAD status post prior RCA PCI     POSTOPERATIVE DIAGNOSIS:  1.  Nonobstructive coronary artery disease.  2.  Widely patent previously placed RCA stents  3.  LVEF 70%, LVEDP 22 mmHg        Imaging  CT without PE, small pericardial effusion  Assessment/Plan  No new Assessment & Plan notes have been filed under this hospital service since the last note was generated.  Service: Cardiac Electrophysiology  1.  Atrial fibrillation with RVR.  Recurrent.  Consider antiarrhythmics.  Check echocardiogram.  Underlying sinus bradycardia which may make it difficult to use antiarrhythmics.  2.  Anticoagulation continue Eliquis.  3.  History of CAD status post RCA stents.  Check serial troponins.  On aspirin.  4.  We will  continue to follow.    Thank you for allowing me to participate in the care of this patient.        Please contact me with any questions.    Tate Ly M.D.   Cardiologist, [facility]  [phone]

## 2022-11-28 NOTE — PROGRESS NOTES
"Med rec completed by med rec tech at [facility] on 11/27/2022, prior to transfer:    \"Med rec updated and complete, per pts son and RX bottles that were returned back to pts son at bedside.   Allergies reviewed, per pts son  Interviewed pt with family at bedside with permission from pt.  Pts son brought in pts RX bottles, Per son reports that pt is not taking RENVELA 800MG.  Pts son reports that he thinks that pt is to be on her BACTRIM 400-80MG 1 tablet once a day for 3 month course, pt started on 11/1/2022\"  "

## 2022-11-28 NOTE — CARE PLAN
The patient is Stable - Low risk of patient condition declining or worsening    Shift Goals  Clinical Goals: Rate Control, BS control  Patient Goals: Rest, Go home  Family Goals: dinner    Progress made toward(s) clinical / shift goals:    Problem: Hemodynamics  Goal: Patient's hemodynamics, fluid balance and neurologic status will be stable or improve  Outcome: Progressing     Problem: Respiratory  Goal: Patient will achieve/maintain optimum respiratory ventilation and gas exchange  Outcome: Progressing     Problem: Communication  Goal: The ability to communicate needs accurately and effectively will improve  Outcome: Progressing       Patient is not progressing towards the following goals: NA

## 2022-11-28 NOTE — PROGRESS NOTES
Assumed care of patient Keisha TRAVIS. Pt is A+O x4. No chest pain or SOB. No active bleeding noted. Informed of safety and call system. rhythm is bradycardia. C/o right abd pain 6/10 pressure, pt had an episode of emesis. Elevated BP reached out to primary for PRN. Interpreted was used during assessment Parris [id number].

## 2022-11-28 NOTE — PROGRESS NOTES
Patient K+ at 5.9. SAMMI Eid notified and gave orders for a 250 mL bolus of dextrose followed by 3 units of insulin to correct hyperkalemia. EKG ordered for moderate chest pain and reviewed by Stanton.

## 2022-11-28 NOTE — PROGRESS NOTES
Received bedside report from RN, pt care assumed, VSS, pt assessment complete. Pt AAOx4, Peruvian speaking only,  needed. with 0/10 of pain at this time. No signs of acute distress noted at this time. Plan of care discussed with pt and verbalizes no questions. Pt denies any additional needs at this time. Bed locked/in lowest position, pt educated on fall risk and verbalized understanding, family at bedside, call light within reach, hourly rounding initiated.

## 2022-11-28 NOTE — ASSESSMENT & PLAN NOTE
Cardioverted after 10 mg of diltiazem IV  Started on amiodarone per cardiology  Continue apixaban  Monitor on telemetry

## 2022-11-28 NOTE — PROGRESS NOTES
Cardiology Follow Up Progress Note    Date of Service  11/28/2022    Attending Physician  Abhijeet Johnston M.D.    Chief Complaint, reason for EP consult:   Afib with RVR    HPI  Interview today conducted using certified , #[id number]  Tere Gallegos is a 71 y.o. female admitted 11/27/2022 with a past medical history significant for CAD with Stenting to RCA, paroxysmal Afib, renal cell carcinoma S/P nephrectomy, previous ESRD with HD requirement secondary to HTN and diabetes, now S/P renal transplant.  Admitted with palpations and chest pain.  Found to be in Afib with RVR with elevated troponin.  She received IV diltiazem which did convert the rhythm.  She was started on low dose amiodarone for rhythm control.  She was seen in consultation by Dr. Ly.        Interim Events  Reports no further chest pain today.  Reports chest tightness and pain to Right lower quadrant.   Monitored rhythm: Sinus kelby, rate 56.   VS stable.     Review of Systems  Review of Systems   Constitutional:  Negative for chills, fatigue and fever.   HENT:  Negative for nosebleeds, tinnitus and trouble swallowing.    Eyes: Negative.    Respiratory:  Positive for chest tightness. Negative for cough, shortness of breath and wheezing.    Cardiovascular:  Negative for chest pain, palpitations and leg swelling.   Gastrointestinal:  Positive for abdominal pain (reports pain to RLQ). Negative for blood in stool.   Endocrine: Negative.    Genitourinary:  Negative for hematuria.   Musculoskeletal: Negative.    Skin: Negative.    Neurological:  Negative for dizziness, syncope, speech difficulty, weakness, light-headedness, numbness and headaches.   Hematological: Negative.    Psychiatric/Behavioral: Negative.       Vital signs in last 24 hours  Temp:  [36.4 °C (97.5 °F)-36.6 °C (97.9 °F)] 36.6 °C (97.8 °F)  Pulse:  [50-57] 54  Resp:  [16] 16  BP: (107-178)/(37-78) 107/41  SpO2:  [95 %-97 %] 97 %    Physical Exam  Physical  Exam    Lab Review  Lab Results   Component Value Date/Time    WBC 3.2 (L) 11/28/2022 01:42 AM    RBC 2.91 (L) 11/28/2022 01:42 AM    HEMOGLOBIN 8.9 (L) 11/28/2022 01:42 AM    HEMATOCRIT 28.0 (L) 11/28/2022 01:42 AM    MCV 96.2 11/28/2022 01:42 AM    MCH 30.6 11/28/2022 01:42 AM    MCHC 31.8 (L) 11/28/2022 01:42 AM    MPV 11.5 11/28/2022 01:42 AM      Lab Results   Component Value Date/Time    SODIUM 133 (L) 11/28/2022 01:42 AM    POTASSIUM 5.6 (H) 11/28/2022 05:26 AM    CHLORIDE 109 11/28/2022 01:42 AM    CO2 14 (L) 11/28/2022 01:42 AM    GLUCOSE 148 (H) 11/28/2022 01:42 AM    BUN 19 11/28/2022 01:42 AM    CREATININE 1.13 11/28/2022 01:42 AM    BUNCREATRAT 8 (L) 01/28/2021 07:00 AM      Lab Results   Component Value Date/Time    ASTSGOT 15 11/28/2022 01:42 AM    ALTSGPT 7 11/28/2022 01:42 AM     Lab Results   Component Value Date/Time    CHOLSTRLTOT 154 10/26/2022 09:54 AM    LDL 65 10/26/2022 09:54 AM    HDL 36 (A) 10/26/2022 09:54 AM    TRIGLYCERIDE 266 (H) 10/26/2022 09:54 AM    TROPONINT 44 (H) 11/27/2022 10:24 PM       No results for input(s): NTPROBNP in the last 72 hours.    Cardiac Imaging and Procedures Review  EKG:  My personal interpretation of the EKG dated 11/28/22 is Sinus kelby.    Echocardiogram:  8/24/22  CONCLUSIONS  Compared to the prior study on 06/04/21.   Normal left ventricular size and systolic function.  The left ventricular ejection fraction is visually estimated to be 65%.  Normal inferior vena cava size and inspiratory collapse.  Estimated right ventricular systolic pressure is 25 mmHg.    Imaging    Assessment/Plan  Atrial Fibrillation with RVR  High Risk Medication Use   NSTEMI  CAD with RCA PCI  Anticoagulation    Atrial Fibrillation  - Admission for atrial fibrillation with RVR, chemically converted with IV diltiazem.   - Started on low dose Amiodarone for rhythm control.  She does have baseline sinus kelby, recommend to continue to antony usage, 200 mg PO daily.   - Has maintained  sinus since conversion, rates ok in the 50s so far.  No symptoms.   - Follow up ECG this AM with appropriate qt.    - Continue low dose Eliquis 2.5 mg BID.     NSTEMI  - Trop 44.  Suspect Type II NSTEMI secondary to AF with RVR.  - Continue ASA 81 mg daily.    - Denies any recurrence of CP (CP resolved when she returned to sinus per chart documentation)      EP will follow peripherally tomorrow to eval heart rate and rhythm while she continues amiodarone loading.   Please call with any questions.       MALGORZATA Casey.   [facility]  [phone]

## 2022-11-28 NOTE — PROGRESS NOTES
Cardiology Follow Up Progress Note    Date of Service  11/28/2022    Attending Physician  Abhijeet Johnston M.D.    Chief Complaint   Palpitations with chest pain    HPI  Tere Gallegos is a 71 y.o. female with PMH of recent right renal transplant (3 weeks ago at [place]), paroxysmal atrial fibrillation anticoagulated with Eliquis, CAD s/p RCA stent (2016), T2DM, HFpEF, hypothyroidism admitted 11/27/2022 with atrial fibrillation with RVR and chest pain.  Patient spontaneously cardioverted after pharmacologic management with 10 mg of diltiazem and transferred from [facility] on 11/27.  Due to persistent bradycardia, patient was started on amiodarone 200 mg without loading dose.      Interim Events  No acute events overnight, patient seen at bedside.   was used.  States that she vomited earlier this morning with associated nausea and right-sided abdominal pain radiating to back.  She however denies chest pain or palpitations, with last bout being yesterday.  She also states that chest pain has been associated palpitations, and denies radiation to left arm or jaw during episodes.  Also denies SOB and LE edema.  Patient also hyperkalemic earlier today at 5.9, given insulin and dextrose with repeat at 5.6.    Review of Systems  Review of Systems   Respiratory:  Negative for chest tightness, shortness of breath and wheezing.    Cardiovascular:  Negative for chest pain, palpitations and leg swelling.   Gastrointestinal:  Positive for abdominal pain, nausea and vomiting.   Neurological:  Negative for syncope, facial asymmetry and light-headedness.     Vital signs in last 24 hours  Temp:  [36.4 °C (97.5 °F)-36.6 °C (97.9 °F)] 36.6 °C (97.8 °F)  Pulse:  [50-58] 58  Resp:  [16] 16  BP: (107-178)/(37-78) 107/41  SpO2:  [95 %-97 %] 97 %    Physical Exam  Physical Exam  Constitutional:       Appearance: Normal appearance. She is normal weight.   HENT:      Head: Normocephalic and  atraumatic.   Eyes:      Conjunctiva/sclera: Conjunctivae normal.   Cardiovascular:      Rate and Rhythm: Normal rate and regular rhythm.      Pulses: Normal pulses.      Heart sounds: Normal heart sounds. No murmur heard.    No friction rub. No gallop.   Pulmonary:      Effort: Pulmonary effort is normal. No respiratory distress.      Breath sounds: Normal breath sounds. No stridor. No wheezing, rhonchi or rales.   Abdominal:      General: Abdomen is flat. Bowel sounds are normal.      Palpations: Abdomen is soft.      Tenderness: There is abdominal tenderness (RUQ and RLQ, radiating to R flank and right back).   Musculoskeletal:      Right lower leg: No edema.      Left lower leg: No edema.   Neurological:      General: No focal deficit present.      Mental Status: She is oriented to person, place, and time.   Psychiatric:         Mood and Affect: Mood normal.         Behavior: Behavior normal.       Lab Review  Lab Results   Component Value Date/Time    WBC 3.2 (L) 11/28/2022 01:42 AM    RBC 2.91 (L) 11/28/2022 01:42 AM    HEMOGLOBIN 8.9 (L) 11/28/2022 01:42 AM    HEMATOCRIT 28.0 (L) 11/28/2022 01:42 AM    MCV 96.2 11/28/2022 01:42 AM    MCH 30.6 11/28/2022 01:42 AM    MCHC 31.8 (L) 11/28/2022 01:42 AM    MPV 11.5 11/28/2022 01:42 AM      Lab Results   Component Value Date/Time    SODIUM 133 (L) 11/28/2022 01:42 AM    POTASSIUM 5.6 (H) 11/28/2022 05:26 AM    CHLORIDE 109 11/28/2022 01:42 AM    CO2 14 (L) 11/28/2022 01:42 AM    GLUCOSE 148 (H) 11/28/2022 01:42 AM    BUN 19 11/28/2022 01:42 AM    CREATININE 1.13 11/28/2022 01:42 AM    BUNCREATRAT 8 (L) 01/28/2021 07:00 AM      Lab Results   Component Value Date/Time    ASTSGOT 15 11/28/2022 01:42 AM    ALTSGPT 7 11/28/2022 01:42 AM     Lab Results   Component Value Date/Time    CHOLSTRLTOT 154 10/26/2022 09:54 AM    LDL 65 10/26/2022 09:54 AM    HDL 36 (A) 10/26/2022 09:54 AM    TRIGLYCERIDE 266 (H) 10/26/2022 09:54 AM    TROPONINT 44 (H) 11/27/2022 10:24 PM        No results for input(s): NTPROBNP in the last 72 hours.    Cardiac Imaging and Procedures Review  EKG:  My personal interpretation of the EKG dated 11/28/22 is sinus bradycardia with PAC    Echocardiogram:  N/A    Cardiac Catheterization:  N/A    Imaging  Chest X-Ray: Diffuse interstitial opacities, likely mild fluid overload    Stress Test: N/A    Assessment/Plan    #Paroxysmal Atrial Fibrillation  #Elevated Troponin  Patient has history of paroxysmal atrial fibrillation, anticoagulated with Eliquis.  She was spontaneously cardioverted with pharmacologic therapy with 10 mg diltiazem at [facility].  Due to patient being bradycardic, was started on 200 mg amiodarone without loading dose.  Patient's troponin also elevated with most recent being 44 (32 on admission), likely secondary to ischemia/demand.    -Discontinued aspirin, increased Eliquis dose to 5 Mg twice daily  -Continue amiodarone 200 Mg daily, however patient will need renal function monitored serially due to interactions between amiodarone and tacrolimus.  Will likely need outpatient tacrolimus  -Switched as needed labetalol to as needed hydralazine due to persistent bradycardia  -Antihypertensives per nephrology recommendations, appreciate support  -Continue to monitor electrolytes through serial labs    Thank you for allowing me to participate in the care of this patient.  I will continue to follow this patient    Please contact me with any questions.    Yoan Allen D.O.   Resident, [facility]  [phone]

## 2022-11-28 NOTE — PROGRESS NOTES
4 Eyes Skin Assessment Completed by TRAVIS Hair and TRAVIS Hopkins.    Head WDL  Ears WDL  Nose WDL  Mouth WDL  Neck WDL  Breast/Chest WDL  Shoulder Blades WDL  Spine WDL  (R) Arm/Elbow/Hand WDL  (L) Arm/Elbow/Hand Redness and Swelling from fistula site  Abdomen scab Incision rt abd incision from kidney transplant  Groin WDL  Scrotum/Coccyx/Buttocks WDL  (R) Leg WDL  (L) Leg WDL  (R) Heel/Foot/Toe WDL  (L) Heel/Foot/Toe WDL          Devices In Places Tele Box and Pulse Ox      Interventions In Place N/A    Possible Skin Injury No    Pictures Uploaded Into Epic Yes  Wound Consult Placed N/A  RN Wound Prevention Protocol Ordered No

## 2022-11-28 NOTE — PROGRESS NOTES
PT arrived with REMSA, in stable conditions. Assumed care of patient. Pt is A+O x4. No chest pain or SOB. No active bleeding noted. Informed of safety and call system. rhythm is sinus kelby.Oriented to room and call for assistance. Indian speaking only used  [id number] Natalie. Pt cooperated well during admission. Howver, pt does not know what medications she takes but the pharmacy in the med rec is correct.

## 2022-11-28 NOTE — ASSESSMENT & PLAN NOTE
Continue immunosuppressants CellCept, tacrolimus,, prophylactic Bactrim, Valcyte, clotrimazole.

## 2022-11-28 NOTE — CONSULTS
Cardiology Initial Consultation    Date of Service  11/27/2022    Referring Physician  Pasha Levy M.D.    Reason for Consultation  Chest pain, atrial fibrillation with rapid ventricular response, history of coronary artery disease with prior stent placement recent renal transplant (3 weeks ago at [facility]).    History of Presenting Illness  Tere Gallegos is a 71 y.o. female with a past medical history of above who presented 11/27/2022 with .    Review of Systems  Review of Systems   Unable to perform ROS: Other   Cardiovascular:  Negative for chest pain.     Past Medical History   has a past medical history of Acquired hypothyroidism (05/04/2020), CAD (coronary artery disease), Chronic diastolic heart failure (HCC) (05/04/2020), Coronary artery disease due to lipid rich plaque, Dental disorder, Diabetes ([facility]), ESRD (end stage renal disease) on dialysis (HCC) (05/04/2020), Hemodialysis patient ([facility]), Hyperlipidemia, Hypertension, Kidney transplant candidate, Kidney transplant recipient (10/31/2022), Presence of drug-eluting stent in right coronary artery, QT prolongation (01/22/2020), RLS (restless legs syndrome) (08/05/2016), and Transaminitis (12/22/2018).    Surgical History   has a past surgical history that includes recovery (08/16/2016); other abdominal surgery; other (Left, 2014); zzz cardiac cath (08/16/2016); zzz cardiac cath (09/07/2016); and other abdominal surgery (Right, 10/31/2022).    Family History  family history includes Diabetes in her brother and sister; Other in her sister.    Social History   reports that she has never smoked. She has never used smokeless tobacco. She reports that she does not drink alcohol and does not use drugs.    Medications  Prior to Admission Medications   Prescriptions Last Dose Informant Patient Reported? Taking?   Alcohol Swabs  Family Member No No   Sig: Wipe site with prep pad prior to injection.   Blood Glucose Meter Kit  Family Member No No    Sig: Test blood sugar as recommended by provider. Freestyle Pearl blood glucose monitoring kit.   Blood Glucose Test Strips  Family Member No No   Sig: Use one Freestyle Pearl strip to test blood sugar once daily .   HYDROcodone-acetaminophen (NORCO) 5-325 MG Tab per tablet  Rx Bottle (For Med Information) Yes No   Sig: Take 1 Tablet by mouth every 6 hours as needed. Indications: Pain   Lancets  Family Member No No   Sig: Use one Freestyle Pearl lancet to test blood sugar once daily .   TRUE METRIX BLOOD GLUCOSE TEST strip  Rx Bottle (For Med Information) No No   Sig: TEST DAILY   acetaminophen (TYLENOL) 500 MG Tab  Family Member Yes No   Sig: Take 500-1,000 mg by mouth every 6 hours as needed. Indications: Pain   apixaban (ELIQUIS) 2.5mg Tab  Rx Bottle (For Med Information) No No   Sig: Take 1 Tablet by mouth 2 times a day. Indications: Thromboembolism secondary to Atrial Fibrillation   aspirin EC (ECOTRIN) 81 MG Tablet Delayed Response  Rx Bottle (For Med Information) Yes No   Sig: Take 1 Tablet by mouth every day.   atorvastatin (LIPITOR) 20 MG Tab  Rx Bottle (For Med Information) Yes No   Sig: Take 1 Tablet by mouth every evening.   carvedilol (COREG) 3.125 MG Tab  Rx Bottle (For Med Information) Yes No   Sig: Take 1 Tablet by mouth 2 times a day with meals.   clotrimazole (MYCELEX) 10 MG Thanh thanh  Rx Bottle (For Med Information) Yes No   Sig: Take 1 Thanh by mouth 4 times a day.   insulin aspart (NOVOLOG FLEXPEN) 100 UNIT/ML injection PEN  Rx Bottle (For Med Information) Yes No   Sig: Inject 2-8 Units under the skin 3 times a day before meals. Sliding Scale   insulin glargine (LANTUS) 100 UNIT/ML SC SOLN  Rx Bottle (For Med Information) Yes No   Sig: Inject 8 Units under the skin every day.   levothyroxine (SYNTHROID) 50 MCG Tab  Rx Bottle (For Med Information) No No   Sig: TOME FERNANDO TABLETA POR VIA ORAL CADA MANANA ON AN EMPTY STOMACH   Patient taking differently: Take 50 mcg by mouth every morning on  an empty stomach. TOME FERNANDO TABLETA POR VIA ORAL CADA MANANA ON AN EMPTY STOMACH  Per pharmacy pt last filled on 8/26/2022 for 90 day supply   mycophenolate (CELLCEPT) 500 MG tablet  Rx Bottle (For Med Information) Yes No   Sig: Take 2 Tablets by mouth 2 times a day.   pantoprazole (PROTONIX) 40 MG Tablet Delayed Response  Patient, Rx Bottle (For Med Information) Yes No   Sig: Take 1 Tablet by mouth every day.   predniSONE (DELTASONE) 5 MG Tab  Patient, Rx Bottle (For Med Information) Yes No   Sig: Take 2 Tablets by mouth every day.   sulfamethoxazole-trimethoprim (BACTRIM) 400-80 MG Tab  Patient, Rx Bottle (For Med Information) Yes No   Sig: Take 1 Tablet by mouth every day. Pt started on 11/1/2022 for 3 month course, per pts son   tacrolimus (PROGRAF) 1 MG Cap  Patient, Rx Bottle (For Med Information) Yes No   Sig: Take 1 Capsule by mouth 2 times a day.   valGANciclovir (VALCYTE) 450 MG tablet  Rx Bottle (For Med Information) No No   Sig: Take 1 Tablet by mouth every Wednesday. 1 tablet every Wednesday and Friday   Patient taking differently: Take 450 mg by mouth every day.      Facility-Administered Medications: None       Allergies  No Known Allergies    Vital signs in last 24 hours  Temp:  [36.5 °C (97.7 °F)-36.6 °C (97.9 °F)] 36.5 °C (97.7 °F)  Pulse:  [54] 54  Resp:  [16] 16  BP: (130)/(52) 130/52  SpO2:  [97 %] 97 %    Physical Exam  Physical Exam  Constitutional:       Appearance: Normal appearance. She is well-developed and normal weight.   HENT:      Head: Normocephalic and atraumatic.      Mouth/Throat:      Mouth: Mucous membranes are moist.   Eyes:      Extraocular Movements: Extraocular movements intact.      Conjunctiva/sclera: Conjunctivae normal.   Musculoskeletal:         General: Normal range of motion.      Cervical back: Normal range of motion and neck supple.   Skin:     General: Skin is dry.   Neurological:      General: No focal deficit present.      Mental Status: She is alert and oriented  to person, place, and time. Mental status is at baseline.   Psychiatric:         Mood and Affect: Mood normal.         Behavior: Behavior normal.         Thought Content: Thought content normal.         Judgment: Judgment normal.       Lab Review  Lab Results   Component Value Date/Time    WBC 3.5 (L) 11/27/2022 11:02 AM    RBC 3.10 (L) 11/27/2022 11:02 AM    HEMOGLOBIN 9.3 (L) 11/27/2022 11:02 AM    HEMATOCRIT 29.4 (L) 11/27/2022 11:02 AM    MCV 94.8 11/27/2022 11:02 AM    MCH 30.0 11/27/2022 11:02 AM    MCHC 31.6 (L) 11/27/2022 11:02 AM    MPV 10.7 11/27/2022 11:02 AM      Lab Results   Component Value Date/Time    SODIUM 137 11/27/2022 11:02 AM    POTASSIUM 4.7 11/27/2022 11:02 AM    CHLORIDE 109 11/27/2022 11:02 AM    CO2 18 (L) 11/27/2022 11:02 AM    GLUCOSE 138 (H) 11/27/2022 11:02 AM    BUN 18 11/27/2022 11:02 AM    CREATININE 1.11 11/27/2022 11:02 AM    BUNCREATRAT 8 (L) 01/28/2021 07:00 AM      Lab Results   Component Value Date/Time    ASTSGOT 12 11/27/2022 11:02 AM    ALTSGPT 7 11/27/2022 11:02 AM     Lab Results   Component Value Date/Time    CHOLSTRLTOT 154 10/26/2022 09:54 AM    LDL 65 10/26/2022 09:54 AM    HDL 36 (A) 10/26/2022 09:54 AM    TRIGLYCERIDE 266 (H) 10/26/2022 09:54 AM    TROPONINT 32 (H) 11/27/2022 11:02 AM       No results for input(s): NTPROBNP in the last 72 hours.    Cardiac Imaging and Procedures Review  CARDIAC STUDIES/PROCEDURES:    CARDIAC CATHETERIZATION CONCLUSIONS by Ryland Clark (06/08/21)  1.  Nonobstructive coronary artery disease.  2.  Widely patent previously placed RCA stents  3.  LVEF 70%, LVEDP 22 mmHg  (study result reviewed)    CARDIAC CATHETERIZATION CONCLUSIONS by Kiran Villalobos (09/07/16)  Status post stenting of the right coronary artery with 2.5 mm and 2.25 mmSynergy drug-eluting stents with 0% residual stenosis in stented segment with IRIS-3 flow into moderately diffuse disease, distal right coronary artery.  (study result reviewed)    CARDIAC  CATHETERIZATION CONCLUSIONS by Kiran Villalobos (08/16/16)  1.  Inferior wall hypokinesis with normal overall left ventricular systolic function.  2.  Chronic total occlusion in mid portion of the right coronary artery with left to right collateral.  3.  In need of coronary artery evaluation prior to undergoing renal transplantation.  4.  End-stage renal disease, on hemodialysis.  (study result reviewed)    ECHOCARDIOGRAM CONCLUSIONS (08/24/22)  Compared to the prior study on 06/04/21.   Normal left ventricular size and systolic function.  The left ventricular ejection fraction is visually estimated to be 65%.  Normal inferior vena cava size and inspiratory collapse.  Estimated right ventricular systolic pressure is 25 mmHg.  (study result reviewed)    EKG performed on (11/7/22) was reviewed: EKG personally interpreted shows sinus bradycardia.  EKG performed on (11/27/22) was reviewed: EKG personally interpreted shows atrial fibrillation with rapid ventricular response.    MYOCARDIAL PERFUSION STUDY CONCLUSIONS (08/13/21)  NUCLEAR IMAGING INTERPRETATION  Normal left ventricular size, ejection fraction, and wall motion.  No evidence of significant jeopardized viable myocardium or prior myocardial infarction.  ECG INTERPRETATION  Nondiagnostic ECG portion of a regadenoson nuclear stress test.  (study result reviewed)    Assessment/Plan  Chest pain due to with atrial fibrillation, rapid ventricular response on chronic anticoagulation therapy (Eliquis): She is clinically doing well. We will start amiodarone to prevent recurrence of her atrial fibrillation.  History of coronary artery disease with prior stent placement: Stable.  Health maintenance: recent renal transplant (3 weeks ago at [facility]).    Thank you for allowing me to participate in the care of this patient.    Cardiology will sign off on this patient    Please contact me with any questions.    Archie Russo M.D.   Cardiologist, [facility]  [phone]

## 2022-11-28 NOTE — DISCHARGE PLANNING
Agency/Facility Name: [facility]   Spoke To: Admission   Outcome: DPA called to verify if Pt in service with agency. It was confirmed with DPA Pt is in service with [facility] [place].

## 2022-11-28 NOTE — PROGRESS NOTES
NOC HOSPITALIST CROSS COVER    Notified by RN regarding notified by RN potassium 5.9.  Orders placed for insulin and dextrose via the hyperkalemia order set.  Repeat potassium 5.6.      A/P:  #Hyperkalemia  -Insulin dextrose per hyperkalemia order set  -Repeat potassium 5.6  -Continue to trend        -----------------------------------------------------------------------------------------------------------    Electronically signed by:  DANITZA Eid PA-C  Hospitalist Services

## 2022-11-28 NOTE — ASSESSMENT & PLAN NOTE
?  Type II NSTEMI secondary to paroxysm of A. fib with RVR  Troponin elevated: 33, 38  EKG did not show STEMI  Cardiology consulted, appreciate recommendations  Echo is pending

## 2022-11-28 NOTE — DISCHARGE PLANNING
Case Management Discharge Planning    Admission Date: 11/27/2022  GMLOS: 4.1  ALOS: 1    6-Clicks ADL Score: 24  6-Clicks Mobility Score: 23      Anticipated Discharge Dispo: Discharge Disposition: D/T to home under A care in anticipation of covered skilled care (06) (Resumption with [facility])    Medically Clear: No    Next Steps: f/u with pt and medical team to discuss dc needs and barriers.    Barriers to Discharge: Medical clearance

## 2022-11-29 VITALS
OXYGEN SATURATION: 96 % | BODY MASS INDEX: 23.77 KG/M2 | HEIGHT: 61 IN | SYSTOLIC BLOOD PRESSURE: 153 MMHG | WEIGHT: 125.88 LBS | RESPIRATION RATE: 17 BRPM | DIASTOLIC BLOOD PRESSURE: 53 MMHG | TEMPERATURE: 98.1 F | HEART RATE: 61 BPM

## 2022-11-29 LAB
ALBUMIN SERPL BCP-MCNC: 3.8 G/DL (ref 3.2–4.9)
BUN SERPL-MCNC: 16 MG/DL (ref 8–22)
CALCIUM SERPL-MCNC: 10 MG/DL (ref 8.5–10.5)
CHLORIDE SERPL-SCNC: 109 MMOL/L (ref 96–112)
CO2 SERPL-SCNC: 17 MMOL/L (ref 20–33)
CREAT SERPL-MCNC: 1.26 MG/DL (ref 0.5–1.4)
EKG IMPRESSION: NORMAL
EKG IMPRESSION: NORMAL
ERYTHROCYTE [DISTWIDTH] IN BLOOD BY AUTOMATED COUNT: 60.1 FL (ref 35.9–50)
GFR SERPLBLD CREATININE-BSD FMLA CKD-EPI: 45 ML/MIN/1.73 M 2
GLUCOSE BLD STRIP.AUTO-MCNC: 134 MG/DL (ref 65–99)
GLUCOSE BLD STRIP.AUTO-MCNC: 153 MG/DL (ref 65–99)
GLUCOSE BLD STRIP.AUTO-MCNC: 245 MG/DL (ref 65–99)
GLUCOSE SERPL-MCNC: 156 MG/DL (ref 65–99)
HCT VFR BLD AUTO: 28.2 % (ref 37–47)
HGB BLD-MCNC: 8.8 G/DL (ref 12–16)
MCH RBC QN AUTO: 29.9 PG (ref 27–33)
MCHC RBC AUTO-ENTMCNC: 31.2 G/DL (ref 33.6–35)
MCV RBC AUTO: 95.9 FL (ref 81.4–97.8)
PHOSPHATE SERPL-MCNC: 1.2 MG/DL (ref 2.5–4.5)
PLATELET # BLD AUTO: 155 K/UL (ref 164–446)
PMV BLD AUTO: 11.1 FL (ref 9–12.9)
POTASSIUM SERPL-SCNC: 5.3 MMOL/L (ref 3.6–5.5)
RBC # BLD AUTO: 2.94 M/UL (ref 4.2–5.4)
SODIUM SERPL-SCNC: 134 MMOL/L (ref 135–145)
WBC # BLD AUTO: 3.6 K/UL (ref 4.8–10.8)

## 2022-11-29 PROCEDURE — 80069 RENAL FUNCTION PANEL: CPT

## 2022-11-29 PROCEDURE — 99239 HOSP IP/OBS DSCHRG MGMT >30: CPT | Performed by: HOSPITALIST

## 2022-11-29 PROCEDURE — 36415 COLL VENOUS BLD VENIPUNCTURE: CPT

## 2022-11-29 PROCEDURE — 93010 ELECTROCARDIOGRAM REPORT: CPT | Mod: 76 | Performed by: INTERNAL MEDICINE

## 2022-11-29 PROCEDURE — A9270 NON-COVERED ITEM OR SERVICE: HCPCS | Performed by: INTERNAL MEDICINE

## 2022-11-29 PROCEDURE — 700102 HCHG RX REV CODE 250 W/ 637 OVERRIDE(OP): Performed by: INTERNAL MEDICINE

## 2022-11-29 PROCEDURE — 700105 HCHG RX REV CODE 258: Performed by: HOSPITALIST

## 2022-11-29 PROCEDURE — 85027 COMPLETE CBC AUTOMATED: CPT

## 2022-11-29 PROCEDURE — 82962 GLUCOSE BLOOD TEST: CPT | Mod: 91

## 2022-11-29 PROCEDURE — 93005 ELECTROCARDIOGRAM TRACING: CPT | Performed by: HOSPITALIST

## 2022-11-29 PROCEDURE — 93005 ELECTROCARDIOGRAM TRACING: CPT | Performed by: NURSE PRACTITIONER

## 2022-11-29 PROCEDURE — 93010 ELECTROCARDIOGRAM REPORT: CPT | Performed by: INTERNAL MEDICINE

## 2022-11-29 PROCEDURE — 700101 HCHG RX REV CODE 250: Performed by: HOSPITALIST

## 2022-11-29 PROCEDURE — 99232 SBSQ HOSP IP/OBS MODERATE 35: CPT | Mod: GC | Performed by: INTERNAL MEDICINE

## 2022-11-29 PROCEDURE — 99232 SBSQ HOSP IP/OBS MODERATE 35: CPT | Performed by: INTERNAL MEDICINE

## 2022-11-29 PROCEDURE — 80197 ASSAY OF TACROLIMUS: CPT

## 2022-11-29 PROCEDURE — 700111 HCHG RX REV CODE 636 W/ 250 OVERRIDE (IP): Performed by: INTERNAL MEDICINE

## 2022-11-29 RX ORDER — SODIUM BICARBONATE 650 MG/1
1300 TABLET ORAL 3 TIMES DAILY
Qty: 120 TABLET | Refills: 3 | Status: SHIPPED | OUTPATIENT
Start: 2022-11-29 | End: 2022-12-08 | Stop reason: SDUPTHER

## 2022-11-29 RX ORDER — VALGANCICLOVIR 450 MG/1
450 TABLET, FILM COATED ORAL DAILY
Status: SHIPPED
Start: 2022-11-29 | End: 2023-02-06

## 2022-11-29 RX ORDER — FUROSEMIDE 40 MG/1
40 TABLET ORAL
Status: DISCONTINUED | OUTPATIENT
Start: 2022-11-30 | End: 2022-11-29 | Stop reason: HOSPADM

## 2022-11-29 RX ORDER — AMIODARONE HYDROCHLORIDE 200 MG/1
200 TABLET ORAL DAILY
Qty: 30 TABLET | Refills: 3 | Status: SHIPPED | OUTPATIENT
Start: 2022-11-30 | End: 2022-12-12 | Stop reason: SDUPTHER

## 2022-11-29 RX ADMIN — SODIUM PHOSPHATE, MONOBASIC, MONOHYDRATE AND SODIUM PHOSPHATE, DIBASIC, ANHYDROUS 30 MMOL: 276; 142 INJECTION, SOLUTION INTRAVENOUS at 11:56

## 2022-11-29 RX ADMIN — SENNOSIDES AND DOCUSATE SODIUM 2 TABLET: 50; 8.6 TABLET ORAL at 05:47

## 2022-11-29 RX ADMIN — MYCOPHENOLATE MOFETIL 1000 MG: 250 CAPSULE ORAL at 05:49

## 2022-11-29 RX ADMIN — CLOTRIMAZOLE 10 MG: 10 LOZENGE ORAL; TOPICAL at 17:12

## 2022-11-29 RX ADMIN — SODIUM BICARBONATE 1300 MG: 650 TABLET ORAL at 15:28

## 2022-11-29 RX ADMIN — LEVOTHYROXINE SODIUM 50 MCG: 0.05 TABLET ORAL at 05:48

## 2022-11-29 RX ADMIN — OMEPRAZOLE 40 MG: 20 CAPSULE, DELAYED RELEASE ORAL at 05:47

## 2022-11-29 RX ADMIN — SODIUM BICARBONATE 1300 MG: 650 TABLET ORAL at 09:18

## 2022-11-29 RX ADMIN — CLOTRIMAZOLE 10 MG: 10 LOZENGE ORAL; TOPICAL at 05:50

## 2022-11-29 RX ADMIN — INSULIN HUMAN 1 UNITS: 100 INJECTION, SOLUTION PARENTERAL at 13:22

## 2022-11-29 RX ADMIN — TACROLIMUS 1 MG: 1 CAPSULE ORAL at 05:47

## 2022-11-29 RX ADMIN — CLOTRIMAZOLE 10 MG: 10 LOZENGE ORAL; TOPICAL at 11:56

## 2022-11-29 RX ADMIN — CLOTRIMAZOLE 10 MG: 10 LOZENGE ORAL; TOPICAL at 00:10

## 2022-11-29 RX ADMIN — APIXABAN 5 MG: 5 TABLET, FILM COATED ORAL at 17:12

## 2022-11-29 RX ADMIN — INSULIN HUMAN 1 UNITS: 100 INJECTION, SOLUTION PARENTERAL at 09:18

## 2022-11-29 RX ADMIN — AMIODARONE HYDROCHLORIDE 200 MG: 200 TABLET ORAL at 05:48

## 2022-11-29 RX ADMIN — APIXABAN 5 MG: 5 TABLET, FILM COATED ORAL at 05:47

## 2022-11-29 RX ADMIN — PREDNISONE 10 MG: 10 TABLET ORAL at 05:47

## 2022-11-29 RX ADMIN — VALGANCICLOVIR 450 MG: 450 TABLET, FILM COATED ORAL at 05:48

## 2022-11-29 RX ADMIN — TACROLIMUS 1 MG: 1 CAPSULE ORAL at 17:12

## 2022-11-29 RX ADMIN — MYCOPHENOLATE MOFETIL 1000 MG: 250 CAPSULE ORAL at 17:12

## 2022-11-29 ASSESSMENT — ENCOUNTER SYMPTOMS
FEVER: 0
WHEEZING: 0
ABDOMINAL PAIN: 0
VOMITING: 0
NAUSEA: 0
LIGHT-HEADEDNESS: 0
SHORTNESS OF BREATH: 0
FACIAL ASYMMETRY: 0
CHEST TIGHTNESS: 0
PALPITATIONS: 0

## 2022-11-29 ASSESSMENT — FIBROSIS 4 INDEX: FIB4 SCORE: 2.54

## 2022-11-29 ASSESSMENT — PAIN DESCRIPTION - PAIN TYPE
TYPE: ACUTE PAIN
TYPE: ACUTE PAIN

## 2022-11-29 NOTE — HOSPITAL COURSE
Tere Gallegos is a 71 y.o. female with past medical history of kidney transplantation 3 weeks ago on immunosuppressants, paroxysmal A. fib on Eliquis, CAD, stent to RCA placement in 2016 type 2 diabetes, heart failure with preserved ejection fraction, hypothyroidism, restless leg syndrome, who presented 11/27/2022 .  Patient transferred from [facility] with paroxysmal A. fib and NSTEMI.  She presented to outside facility complaining of left-sided chest pain and left arm heaviness that started at 9:30 AM with associated nausea, shortness of breath and palpitation.  Patient was treated for A. fib with RVR with 10 mg of diltiazem which cardioverted.  When I saw the patient she was still complaining of some left sided chest pain that she could not characterize very well, 4 out of 10.  On EKG she has sinus bradycardia 50 beats per minutes with some ST elevation and standard III-lead does not meet STEMI criteria.  I discussed case with Dr. Ly who agreed to consult.

## 2022-11-29 NOTE — DISCHARGE PLANNING
Received Choice form at 0946  Agency/Facility Name: [facility]  Referral sent per Choice form @ 0914     1040  Agency/Facility Name: [facility]   Spoke To: Rodo   Outcome: DPA called to f/u with referral. Per Rodo referral not received yet, but will review referral once received. DPA to call back with a f/u.     0420  Agency/Facility Name: [facility]   Spoke To: Christo   Outcome: Referral still under review, Christo to contact DPA with updates.

## 2022-11-29 NOTE — FACE TO FACE
Face to Face Supporting Documentation - Home Health    The encounter with this patient was in whole or in part the primary reason for home health admission.    Date of encounter:   Patient:                    MRN:                       YOB: 2023  Tere Gallegos  [id number]  1950     Home health to see patient for:  Skilled Nursing care for assessment, interventions & education, Home health aide, Physical Therapy evaluation and treatment, and Occupational therapy evaluation and treatment        Homebound status evidenced by:  Need the aid of supportive devices such as crutches, canes, wheelchairs or walkers. Leaving home requires a considerable and taxing effort. There is a normal inability to leave the home.    Community Physician to provide follow up care: ANGELITA Concepcion     Optional Interventions? No      I certify the face to face encounter for this home health care referral meets the CMS requirements and the encounter/clinical assessment with the patient was, in whole, or in part, for the medical condition(s) listed above, which is the primary reason for home health care. Based on my clinical findings: the service(s) are medically necessary, support the need for home health care, and the homebound criteria are met.  I certify that this patient has had a face to face encounter by myself and the nurse practitioner working collaboratively with me.  Luis Ortiz M.D. - NPI: [id number]

## 2022-11-29 NOTE — PROGRESS NOTES
Hospital Medicine Daily Progress Note    Date of Service  11/28/2022    Chief Complaint  Tere Gallegos is a 71 y.o. female admitted 11/27/2022 with chest pain palpitations    Hospital Course  Tere Gallegos is a 71 y.o. female with past medical history of kidney transplantation 3 weeks ago on immunosuppressants, paroxysmal A. fib on Eliquis, CAD, stent to RCA placement in 2016 type 2 diabetes, heart failure with preserved ejection fraction, hypothyroidism, restless leg syndrome, who presented 11/27/2022 .  Patient transferred from [facility] with paroxysmal A. fib and NSTEMI.  She presented to outside facility complaining of left-sided chest pain and left arm heaviness that started at 9:30 AM with associated nausea, shortness of breath and palpitation.  Patient was treated for A. fib with RVR with 10 mg of diltiazem which cardioverted.  When I saw the patient she was still complaining of some left sided chest pain that she could not characterize very well, 4 out of 10.  On EKG she has sinus bradycardia 50 beats per minutes with some ST elevation and standard III-lead does not meet STEMI criteria.  I discussed case with Dr. Ly who agreed to consult.    Interval Problem Update  11/28/2022:  Continue present medical management patient no acute events overnight.  Patient was evaluated by cardiology in addition consultation was also placed formally with nephrology.  Patient is status post renal transplant approximately 3 weeks prior at [place].  Patient Chilean-speaking only conversation was had with her at bedside in Chilean.  Patient offers no acute complaints at this time.  Blood pressure medications were slightly adjusted.  Appreciate recommendations of cardiology and nephrology    I have discussed this patient's plan of care and discharge plan at IDT rounds today with Case Management, Nursing, Nursing leadership, and other members of the IDT team.    Consultants/Specialty  cardiology and  nephrology    Code Status  Full Code    Disposition  Patient is not medically cleared for discharge.   Anticipate discharge to to home with close outpatient follow-up.  I have placed the appropriate orders for post-discharge needs.    Review of Systems  ROS     Physical Exam  Temp:  [36.4 °C (97.5 °F)-36.6 °C (97.8 °F)] 36.6 °C (97.8 °F)  Pulse:  [50-58] 58  Resp:  [16] 16  BP: (107-178)/(37-78) 115/37  SpO2:  [94 %-97 %] 94 %    Physical Exam    Fluids    Intake/Output Summary (Last 24 hours) at 11/28/2022 1747  Last data filed at 11/27/2022 2030  Gross per 24 hour   Intake 120 ml   Output --   Net 120 ml       Laboratory  Recent Labs     11/27/22  1102 11/28/22  0142   WBC 3.5* 3.2*   RBC 3.10* 2.91*   HEMOGLOBIN 9.3* 8.9*   HEMATOCRIT 29.4* 28.0*   MCV 94.8 96.2   MCH 30.0 30.6   MCHC 31.6* 31.8*   RDW 58.1* 59.7*   PLATELETCT 168 161*   MPV 10.7 11.5     Recent Labs     11/27/22  1102 11/28/22  0142 11/28/22  0526   SODIUM 137 133*  --    POTASSIUM 4.7 5.9* 5.6*   CHLORIDE 109 109  --    CO2 18* 14*  --    GLUCOSE 138* 148*  --    BUN 18 19  --    CREATININE 1.11 1.13  --    CALCIUM 10.2 9.8  --      Recent Labs     11/27/22  1102   APTT 29.4   INR 1.31*               Imaging  EC-ECHOCARDIOGRAM COMPLETE W/O CONT    (Results Pending)        Assessment/Plan  NSTEMI (non-ST elevated myocardial infarction) (HCC)  Assessment & Plan  ?  Type II NSTEMI secondary to paroxysm of A. fib with RVR  Troponin elevated: 33, 38  EKG did not show STEMI  Cardiology consulted, appreciate recommendations  Echo is pending    S/p cadaver renal transplant- (present on admission)  Assessment & Plan  Continue immunosuppressants CellCept, tacrolimus,, prophylactic Bactrim, Valcyte, clotrimazole.          Paroxysmal A-fib (HCC)- (present on admission)  Assessment & Plan  Cardioverted after 10 mg of diltiazem IV  Started on amiodarone per cardiology  Continue apixaban  Monitor on telemetry    Acquired hypothyroidism- (present on  admission)  Assessment & Plan  Resume levothyroxine    Chronic diastolic heart failure (HCC)- (present on admission)  Assessment & Plan  Not in exacerbation  Monitor    Controlled type 2 diabetes mellitus with chronic kidney disease on chronic dialysis, without long-term current use of insulin (HCC)- (present on admission)  Assessment & Plan  Continue insulin sliding scale  Will hold Lantus       Please note that this dictation was created using voice recognition software. I have made every reasonable attempt to correct obvious errors, but I expect that there are errors of grammar and possibly context that I did not discover before finalizing the note.    VTE prophylaxis: SCDs/TEDs    I have performed a physical exam and reviewed and updated ROS and Plan today (11/28/2022). In review of yesterday's note (11/27/2022), there are no changes except as documented above.

## 2022-11-29 NOTE — PROGRESS NOTES
Cardiology Follow Up Progress Note    Date of Service  11/29/2022    Attending Physician  Abhijeet Johnston M.D.    Chief Complaint   Palpitations with chest pain    HPI  Tere Gallegos is a 71 y.o. female with PMH of recent right renal transplant (3 weeks ago at [place]), paroxysmal atrial fibrillation anticoagulated with Eliquis, CAD s/p RCA stent (2016), T2DM, HFpEF, hypothyroidism admitted 11/27/2022 with atrial fibrillation with RVR and chest pain.  Patient spontaneously cardioverted after pharmacologic management with 10 mg of diltiazem and transferred from [facility] on 11/27.  Due to persistent bradycardia, patient was started on amiodarone 200 mg without loading dose.  Had bout of nausea/vomiting with right-sided abdominal pain rating to the back morning of 11/28, has since resolved.  Denies SOB and LE edema.  Also mildly hyperkalemic earlier on 11/28 morning at 5.9, given insulin and dextrose with repeat at 5.6.      Interim Events  Nephrology has seen patient, discontinue Bactrim, and adding sodium bicarbonate for metabolic acidosis.  Patient still denies chest pain or palpitations, and nausea/vomiting and abdominal pain has resolved since yesterday morning.  EKG demonstrated sinus rhythm with borderline RAD.  Patient is still had bouts of bradycardia in the 50s on telemetry.  She states she is overall feeling back to normal.  Echocardiogram demonstrated LVEF of 60% with grade 2 diastolic dysfunction    Review of Systems  Review of Systems   Respiratory:  Negative for chest tightness, shortness of breath and wheezing.    Cardiovascular:  Negative for chest pain, palpitations and leg swelling.   Gastrointestinal:  Negative for abdominal pain, nausea and vomiting.   Neurological:  Negative for syncope, facial asymmetry and light-headedness.     Vital signs in last 24 hours  Temp:  [36.7 °C (98.1 °F)] 36.7 °C (98.1 °F)  Pulse:  [50-68] 63  Resp:  [16-18] 16  BP: (115-167)/(37-59)  141/48  SpO2:  [94 %-97 %] 97 %    Physical Exam  Physical Exam  Constitutional:       Appearance: Normal appearance. She is normal weight.   HENT:      Head: Normocephalic and atraumatic.   Eyes:      Conjunctiva/sclera: Conjunctivae normal.   Cardiovascular:      Rate and Rhythm: Normal rate and regular rhythm.      Pulses: Normal pulses.      Heart sounds: Normal heart sounds. No murmur heard.    No friction rub. No gallop.   Pulmonary:      Effort: Pulmonary effort is normal. No respiratory distress.      Breath sounds: Normal breath sounds. No stridor. No wheezing, rhonchi or rales.   Abdominal:      General: Abdomen is flat. Bowel sounds are normal.      Palpations: Abdomen is soft.      Tenderness: There is no abdominal tenderness.   Musculoskeletal:      Right lower leg: No edema.      Left lower leg: No edema.   Neurological:      General: No focal deficit present.      Mental Status: She is oriented to person, place, and time.   Psychiatric:         Mood and Affect: Mood normal.         Behavior: Behavior normal.       Lab Review  Lab Results   Component Value Date/Time    WBC 3.6 (L) 11/29/2022 09:05 AM    RBC 2.94 (L) 11/29/2022 09:05 AM    HEMOGLOBIN 8.8 (L) 11/29/2022 09:05 AM    HEMATOCRIT 28.2 (L) 11/29/2022 09:05 AM    MCV 95.9 11/29/2022 09:05 AM    MCH 29.9 11/29/2022 09:05 AM    MCHC 31.2 (L) 11/29/2022 09:05 AM    MPV 11.1 11/29/2022 09:05 AM      Lab Results   Component Value Date/Time    SODIUM 134 (L) 11/29/2022 09:05 AM    POTASSIUM 5.3 11/29/2022 09:05 AM    CHLORIDE 109 11/29/2022 09:05 AM    CO2 17 (L) 11/29/2022 09:05 AM    GLUCOSE 156 (H) 11/29/2022 09:05 AM    BUN 16 11/29/2022 09:05 AM    CREATININE 1.26 11/29/2022 09:05 AM    BUNCREATRAT 8 (L) 01/28/2021 07:00 AM      Lab Results   Component Value Date/Time    ASTSGOT 15 11/28/2022 01:42 AM    ALTSGPT 7 11/28/2022 01:42 AM     Lab Results   Component Value Date/Time    CHOLSTRLTOT 154 10/26/2022 09:54 AM    LDL 65 10/26/2022 09:54 AM     HDL 36 (A) 10/26/2022 09:54 AM    TRIGLYCERIDE 266 (H) 10/26/2022 09:54 AM    TROPONINT 44 (H) 11/27/2022 10:24 PM       No results for input(s): NTPROBNP in the last 72 hours.    Cardiac Imaging and Procedures Review  EKG:  My personal interpretation of the EKG dated 11/28/22 is sinus rhythm with borderline RAD    Echocardiogram: LVEF 60% with grade 2 diastolic dysfunction    Cardiac Catheterization:  N/A    Imaging  Chest X-Ray: Diffuse interstitial opacities, likely mild fluid overload    Stress Test: N/A    Assessment/Plan    #Paroxysmal Atrial Fibrillation  #Elevated Troponin  #Sinur Bradycardia  #CAD s/p RCA Stent (2016)  #HFpEF  #HTN  #T2DM  #HLD  Patient has history of paroxysmal atrial fibrillation, anticoagulated with Eliquis.  She was spontaneously cardioverted with pharmacologic therapy with 10 mg diltiazem at [facility].  Due to patient being bradycardic, was started on 200 mg amiodarone without loading dose.  Patient's troponin also elevated with most recent being 44 (32 on admission), likely secondary to ischemia/demand.  Patient now feeling back to baseline.  Repeat EKG demonstrated sinus rhythm with borderline RAD and HR of 62.  Echo demonstrated LVEF of 60% with grade 2 diastolic dysfunction    -Continue Eliquis 5 Mg twice daily  -Continue amiodarone 200 Mg daily, however patient will need renal function monitored serially due to interactions between amiodarone and tacrolimus.  Will likely need outpatient tacrolimus levels monitored.  -Consider possible future ablation with EP  -Avoid chronotropic agents  -Continue as needed hydralazine   -Continue sodium bicarb and per Nephrology recommendations  -Continue to monitor electrolytes through serial labs  -Recommend outpatient follow-up with cardiology in 2 weeks  -As there are no new recommendations, cardiology will sign off    Thank you for allowing me to participate in the care of this patient.  Cardiology will sign off on this  patient    Please contact me with any questions.    Yoan Allen D.O.   Resident, [facility]  [phone]

## 2022-11-29 NOTE — CONSULTS
Nephrology Consultation    Date of Service  2022    Referring Physician  Abhijeet Johnston M.D.    Consulting Physician  Priyank Villar M.D.    Reason for Consultation  Management of ESRD s/p DDKT 10/31/22    History of Presenting Illness  71 y.o. female with history of ESRD status post  donor kidney transplant 10/31/2022 notable for delayed graft function requiring dialysis until 2022, diabetes, hypertension who presented 2022 with chest pain.    The patient says she was experiencing central chest pain and left arm pain.  This persisted, so she came into the hospital.  She was found to have mild hyperkalemia, atrial fibrillation with RVR.  On my evaluation today, the patient has converted into sinus rhythm, she no longer has chest pain.  The patient has been urinating very well, and feeling well off of dialysis.  She denies headache, nausea, vomiting.      Review of Systems  Review of Systems   Constitutional:  Negative for fever.   Respiratory:  Negative for shortness of breath.    Cardiovascular:  Negative for chest pain.   Gastrointestinal:  Negative for abdominal pain.   All other systems reviewed and are negative.    Past Medical History  Past Medical History:   Diagnosis Date    Acquired hypothyroidism 2020    CAD (coronary artery disease)     Chronic diastolic heart failure ([facility]) 2020    Coronary artery disease due to lipid rich plaque     2 Synergy NOEMI to 100% RCA stent placed    Dental disorder     partial dentures- uppers    Diabetes ([facility])     oral medication    ESRD (end stage renal disease) on dialysis ([facility]) 2020    Hemodialysis patient ([facility])     M, W, F    Hyperlipidemia     Hypertension     Kidney transplant candidate     Kidney transplant recipient 10/31/2022    Presence of drug-eluting stent in right coronary artery     QT prolongation 2020    RLS (restless legs syndrome) 2016    Transaminitis 2018       Surgical History  Past Surgical  History:   Procedure Laterality Date    OTHER ABDOMINAL SURGERY Right 10/31/2022     Donor kidney transplant - [facility]    Z CARDIAC CATH  2016    RCA stented with 2 Synergy drug-eluting stents.    RECOVERY  2016    Procedure: CATH LAB [facility] WITH POSSIBLE DR. CASTILLO;  Surgeon: Recoveryonly Surgery;  Location: SURGERY PRE-POST PROC UNIT [facility];  Service:     ZZZ CARDIAC CATH  2016    100% RCA    OTHER Left 2014    left arm upper extremity fistula    OTHER ABDOMINAL SURGERY      left kidney removed due to cancer       Family History  Family History   Problem Relation Age of Onset    Diabetes Sister     Other Sister         liver disease    Diabetes Brother     Heart Disease Neg Hx        Social History  Social History     Socioeconomic History    Marital status:      Spouse name: Not on file    Number of children: Not on file    Years of education: Not on file    Highest education level: Not on file   Occupational History    Not on file   Tobacco Use    Smoking status: Never    Smokeless tobacco: Never   Vaping Use    Vaping Use: Never used   Substance and Sexual Activity    Alcohol use: No     Alcohol/week: 0.0 oz    Drug use: No    Sexual activity: Never   Other Topics Concern    Not on file   Social History Narrative    Not on file     Social Determinants of Health     Financial Resource Strain: Not on file   Food Insecurity: Not on file   Transportation Needs: Not on file   Physical Activity: Not on file   Stress: Not on file   Social Connections: Not on file   Intimate Partner Violence: Not on file   Housing Stability: Not on file       Medications  Current Facility-Administered Medications   Medication Dose Route Frequency Provider Last Rate Last Admin    [START ON 2022] amiodarone (Cordarone) tablet 200 mg  200 mg Oral Q DAY Tate Ly M.D.        hydrALAZINE (APRESOLINE) injection 20 mg  20 mg Intravenous Q6HRS PRN Abhijeet Johnston M.D.   20 mg  at 11/28/22 1007    sodium bicarbonate tablet 1,300 mg  1,300 mg Oral TID Priyank Villar M.D.   1,300 mg at 11/28/22 1612    apixaban (ELIQUIS) tablet 5 mg  5 mg Oral BID Jesus Alberto Vazquez M.D.        senna-docusate (PERICOLACE or SENOKOT S) 8.6-50 MG per tablet 2 Tablet  2 Tablet Oral BID Timbo Griffin M.D.   2 Tablet at 11/27/22 1845    And    polyethylene glycol/lytes (MIRALAX) PACKET 1 Packet  1 Packet Oral QDAY PRN Timbo Griffin M.D.        And    magnesium hydroxide (MILK OF MAGNESIA) suspension 30 mL  30 mL Oral QDAY PRN Timbo Griffin M.D.   30 mL at 11/28/22 0548    And    bisacodyl (DULCOLAX) suppository 10 mg  10 mg Rectal QDAY PRN Timbo Griffin M.D.        acetaminophen (Tylenol) tablet 650 mg  650 mg Oral Q6HRS PRN Timbo Griffin M.D.        Pharmacy Consult Request ...Pain Management Review 1 Each  1 Each Other PHARMACY TO DOSE iTmbo Griffin M.D.        oxyCODONE immediate-release (ROXICODONE) tablet 2.5 mg  2.5 mg Oral Q3HRS PRN Timbo Griffin M.D.   2.5 mg at 11/28/22 0905    Or    oxyCODONE immediate-release (ROXICODONE) tablet 5 mg  5 mg Oral Q3HRS PRN Timbo Griffin M.D.        ondansetron (ZOFRAN) syringe/vial injection 4 mg  4 mg Intravenous Q4HRS PRKAYKAY Griffin M.D.   4 mg at 11/28/22 0902    ondansetron (ZOFRAN ODT) dispertab 4 mg  4 mg Oral Q4HRS PRN Timbo Griffin M.D.        insulin regular (HumuLIN R,NovoLIN R) injection  1-6 Units Subcutaneous Q6HRS Timbo Griffin M.D.   2 Units at 11/28/22 1324    And    dextrose 10 % BOLUS 25 g  25 g Intravenous Q15 MIN PRN Timbo Griffin M.D.        atorvastatin (LIPITOR) tablet 20 mg  20 mg Oral Nightly Timbo Griffin M.D.   20 mg at 11/27/22 2023    levothyroxine (SYNTHROID) tablet 50 mcg  50 mcg Oral AM ES Timbo Griffin M.D.   50 mcg at 11/28/22 0608    mycophenolate (CELLCEPT) capsule 1,000 mg  1,000 mg Oral Q12HRS Timbo Griffin M.D.   1,000 mg at 11/28/22 0608    omeprazole (PRILOSEC) capsule  40 mg  40 mg Oral DAILY Timbo Griffin M.D.   40 mg at 11/28/22 0608    predniSONE (DELTASONE) tablet 10 mg  10 mg Oral DAILY SERGEI TimDOctaviano   10 mg at 11/28/22 0608    [Held by provider] sulfamethoxazole-trimethoprim (BACTRIM) 400-80 MG per tablet 1 Tablet  1 Tablet Oral DAILY Timbo Griffin M.D.   1 Tablet at 11/28/22 0608    tacrolimus (PROGRAF) capsule 1 mg  1 mg Oral Q12HRS Timbo Griffin M.D.   1 mg at 11/28/22 0612    valGANciclovir (VALCYTE) tablet 450 mg  450 mg Oral DAILY Timbo Griffin M.D.   450 mg at 11/28/22 0608    morphine 4 MG/ML injection 2 mg  2 mg Intravenous Q3HRS PRN Timbo Griffin M.D.        clotrimazole (Mycelex) eli 10 mg  10 mg Oral 4XDAY Timbo Griffin M.D.   10 mg at 11/28/22 1303       Allergies  No Known Allergies    Physical Exam  Temp:  [36.4 °C (97.5 °F)-36.6 °C (97.9 °F)] 36.6 °C (97.8 °F)  Pulse:  [50-58] 58  Resp:  [16] 16  BP: (107-178)/(37-78) 115/37  SpO2:  [94 %-97 %] 94 %    Physical Exam  Constitutional:       General: She is not in acute distress.     Appearance: She is well-developed.   HENT:      Head: Normocephalic and atraumatic.      Mouth/Throat:      Mouth: Mucous membranes are moist.      Pharynx: Oropharynx is clear. No oropharyngeal exudate.   Eyes:      General: No scleral icterus.     Extraocular Movements: Extraocular movements intact.   Neck:      Trachea: No tracheal deviation.   Cardiovascular:      Rate and Rhythm: Normal rate and regular rhythm.      Heart sounds: Normal heart sounds. No murmur heard.     Comments: Regular rhythm on my evaluation  Pulmonary:      Effort: Pulmonary effort is normal.      Breath sounds: No stridor. No rales.   Abdominal:      Palpations: Abdomen is soft.      Tenderness: There is no abdominal tenderness.   Musculoskeletal:         General: Normal range of motion.      Cervical back: Normal range of motion. No rigidity.      Right lower leg: No edema.      Left lower leg: No edema.   Skin:      General: Skin is warm and dry.   Neurological:      General: No focal deficit present.      Mental Status: She is alert and oriented to person, place, and time.   Psychiatric:         Mood and Affect: Mood normal.         Behavior: Behavior normal.   Dialysis access: Left upper arm AV fistula, patent bruit and thrill      Fluids      Laboratory  Labs reviewed, pertinent labs below.  Recent Labs     22  1102 22  0142   WBC 3.5* 3.2*   RBC 3.10* 2.91*   HEMOGLOBIN 9.3* 8.9*   HEMATOCRIT 29.4* 28.0*   MCV 94.8 96.2   MCH 30.0 30.6   MCHC 31.6* 31.8*   RDW 58.1* 59.7*   PLATELETCT 168 161*   MPV 10.7 11.5     Recent Labs     22  1102 22  0142 22  0526   SODIUM 137 133*  --    POTASSIUM 4.7 5.9* 5.6*   CHLORIDE 109 109  --    CO2 18* 14*  --    GLUCOSE 138* 148*  --    BUN 18 19  --    CREATININE 1.11 1.13  --    CALCIUM 10.2 9.8  --      Recent Labs     22  110   APTT 29.4   INR 1.31*            URINALYSIS:  Lab Results   Component Value Date/Time    COLORURINE Yellow 2022 1230    CLARITY Clear 2022 1230    SPECGRAVITY 1.010 2022 1230    PHURINE 6.0 2022 1230    KETONES Negative 2022 1230    PROTEINURIN Negative 2022 1230    BILIRUBINUR Negative 2022 1230    NITRITE Negative 2022 1230    LEUKESTERAS Negative 2022 1230    OCCULTBLOOD Moderate (A) 2022 1230     UPC  No results found for: TOTPROTUR No results found for: CREATININEU    Imaging interpreted by radiologist. Imaging reports reviewed with pertinent findings below  EC-ECHOCARDIOGRAM COMPLETE W/O CONT    (Results Pending)         Assessment/Plan  71 y.o. female with history of ESRD status post  donor kidney transplant 10/31/2022 at [facility] notable for delayed graft function requiring dialysis until 2022, diabetes, hypertension who presented 2022 with chest pain.    1.  ESRD status post  donor kidney transplant 10/31/2022, with transplant  CKD 3A.  Patient has excellent functioning kidney allograft.  Avoid nephrotoxins.  Check labs daily.    2.  Hyperkalemia, likely due to metabolic acidosis and Bactrim.  Recommend stop Bactrim, and consider alternate PJP prophylaxis.  Start bicarbonate supplements as below.  If patient is hypertensive, I would recommend adding Lasix 40 mg p.o. daily.    3.  Metabolic acidosis, unclear etiology.  Start sodium bicarbonate 1300 mg p.o. 3 times daily.  On discharge, this can be reduced to 1300 mg p.o. twice daily.    4.  Hyponatremia, mild.  Limit hypotonic fluids.  Check labs daily.    5.  Pancytopenia, likely medication induced from immunosuppression.  Mild.  Continue immunosuppression as below.    6.  Immunosuppression, continue mycophenolate 1000 mg p.o. twice daily, tacrolimus 1 mg p.o. twice daily, prednisone 10 mg daily.  Check trough tacrolimus level.  If pancytopenia worsens, I would recommend decrease mycophenolate to 750 mg p.o. twice daily, but I would confirm this with transplant at [facility].    7.  Paroxysmal atrial fibrillation, as patient now has improved kidney function, there is no nephrologic contraindication for full-strength apixaban 5 mg p.o. twice daily.      Priyank Villar MD  Nephrology  [facility]

## 2022-11-29 NOTE — DISCHARGE SUMMARY
Discharge Summary    CHIEF COMPLAINT ON ADMISSION  No chief complaint on file.      Reason for Admission  Atrial fibrillation with RVR, NSTE*     Admission Date  11/27/2022    CODE STATUS  Prior    HPI & HOSPITAL COURSE    Tere Gallegos is a 71 y.o. female with past medical history of kidney transplantation 3 weeks ago on immunosuppressants, paroxysmal A. fib on Eliquis, CAD, stent to RCA placement in 2016 type 2 diabetes, heart failure with preserved ejection fraction, hypothyroidism, restless leg syndrome, who presented 11/27/2022 .  Patient transferred from [facility] with paroxysmal A. fib and NSTEMI.  She presented to outside facility complaining of left-sided chest pain and left arm heaviness that started at 9:30 AM with associated nausea, shortness of breath and palpitation.  Patient was treated for A. fib with RVR with 10 mg of diltiazem which cardioverted.  When I saw the patient she was still complaining of some left sided chest pain that she could not characterize very well, 4 out of 10.  On EKG she has sinus bradycardia 50 beats per minutes with some ST elevation and standard III-lead does not meet STEMI criteria.  This was likely secondary to LVH.  Patient was started on oral amiodarone.  Patient's blood pressure was slightly elevated in cardiology recommended to start losartan.  Nephrology recommended to start sodium bicarb and Lasix.  She was asked to follow-up with cardiology and nephrology as an outpatient.    Therefore, she is discharged in good and stable condition to home with close outpatient follow-up.    The patient met 2-midnight criteria for an inpatient stay at the time of discharge.    Discharge Date  11/29/2022    FOLLOW UP ITEMS POST DISCHARGE  Follow-up with nephrology and cardiology    DISCHARGE DIAGNOSES  Principal Problem:    Atrial fibrillation with RVR ([facility]) POA: Yes  Active Problems:    Controlled type 2 diabetes mellitus with chronic kidney disease on chronic dialysis,  without long-term current use of insulin (HCC) POA: Yes      Overview: Patient is only on pioglitazone for her diabetes. She used to follow with       endocrinology but has not visited since December 2021    Chronic diastolic heart failure (HCC) POA: Yes    Acquired hypothyroidism POA: Yes    Paroxysmal A-fib (HCC) POA: Yes    S/p cadaver renal transplant POA: Yes    NSTEMI (non-ST elevated myocardial infarction) (HCC) POA: Unknown  Resolved Problems:    * No resolved hospital problems. *      FOLLOW UP  Future Appointments   Date Time Provider Department Center   12/14/2022  9:40 AM ANGELITA Concepcion [facility]ET [facility]inson   12/14/2022  4:15 PM Milton Pettit M.D. [facility] None   2/6/2023  4:45 PM [facility] EXAM 4 [facility] None     [facility]  [address]  [facility] [address]  [phone]        ANGELITA Concepcion  [address] [facility]  [address]  [place] [address]  [phone]            MEDICATIONS ON DISCHARGE     Medication List        START taking these medications        Instructions   amiodarone 200 MG Tabs  Commonly known as: Cordarone   Take 1 Tablet by mouth every day.  Dose: 200 mg     fluticasone 50 MCG/ACT nasal spray  Commonly known as: FLONASE   ADMINISTER 1 SPRAY INTO AFFECTED NOSTRIL(S) EVERY DAY.  Dose: 1 Spray     sodium bicarbonate 650 MG Tabs  Commonly known as: SODIUM BICARBONATE   Take 2 Tablets by mouth in the morning, at noon, and at bedtime.  Dose: 1,300 mg            CONTINUE taking these medications        Instructions   acetaminophen 500 MG Tabs  Commonly known as: TYLENOL   Take 500-1,000 mg by mouth every 6 hours as needed. Indications: Pain  Dose: 500-1,000 mg     Alcohol Swabs   Doctor's comments: Per formulary preference. ICD-10 code: E11.9 Controlled type 2 Diabetes Mellitus  Wipe site with prep pad prior to injection.     apixaban 2.5mg Tabs  Commonly known as: ELIQUIS   Take 1 Tablet by mouth 2 times a day. Indications: Thromboembolism secondary to  Atrial Fibrillation  Dose: 2.5 mg     aspirin EC 81 MG Tbec  Commonly known as: ECOTRIN   Take 1 Tablet by mouth every day.  Dose: 81 mg     atorvastatin 20 MG Tabs  Commonly known as: LIPITOR   Take 1 Tablet by mouth every evening.  Dose: 20 mg     * Blood Glucose Meter Kit   Doctor's comments: Or per formulary preference. ICD-10 code: E11.9 Controlled type 2 Diabetes Mellitus  Test blood sugar as recommended by provider. Freestyle Pearl blood glucose monitoring kit.     * Blood Glucose Test Strips   Doctor's comments: Or per formulary preference. ICD-10 code: E11.9 Controlled type 2 Diabetes Mellitus  Use one Freestyle Pearl strip to test blood sugar once daily .     carvedilol 3.125 MG Tabs  Commonly known as: COREG   Take 1 Tablet by mouth 2 times a day with meals.  Dose: 3.125 mg     clotrimazole 10 MG Troc thanh  Commonly known as: Mycelex   Take 1 Thanh by mouth 4 times a day.  Dose: 10 mg     HYDROcodone-acetaminophen 5-325 MG Tabs per tablet  Commonly known as: NORCO   Take 1 Tablet by mouth every 6 hours as needed. Indications: Pain  Dose: 1 Tablet     insulin glargine 100 UNIT/ML Soln  Commonly known as: Lantus   Inject 8 Units under the skin every day.  Dose: 8 Units     Lancets   Doctor's comments: Or per formulary preference. ICD-10 code: E11.9 Controlled type 2 Diabetes Mellitus  Use one Freestyle Pearl lancet to test blood sugar once daily .     levothyroxine 50 MCG Tabs  Commonly known as: SYNTHROID   Take 50 mcg by mouth every morning on an empty stomach.  Dose: 50 mcg     mycophenolate 500 MG tablet  Commonly known as: CELLCEPT   Take 2 Tablets by mouth 2 times a day.  Dose: 1,000 mg     NovoLOG FlexPen 100 UNIT/ML injection PEN  Generic drug: insulin aspart   Inject 2-8 Units under the skin 3 times a day before meals. Sliding Scale  Dose: 2-8 Units     pantoprazole 40 MG Tbec  Commonly known as: PROTONIX   Take 1 Tablet by mouth every day.  Dose: 40 mg     predniSONE 5 MG Tabs  Commonly known  as: DELTASONE   Take 2 Tablets by mouth every day.  Dose: 10 mg     sulfamethoxazole-trimethoprim 400-80 MG Tabs  Commonly known as: BACTRIM   Take 1 Tablet by mouth every day. Pt started on 11/1/2022 for 3 month course, per pts son  Dose: 1 Tablet     tacrolimus 1 MG Caps  Commonly known as: PROGRAF   Take 1 Capsule by mouth 2 times a day.  Dose: 1 mg     True Metrix Blood Glucose Test strip  Generic drug: glucose blood   Doctor's comments: DX Code Needed  NEED NEW RX FOR LANCETS AS WELL.  TEST DAILY     valGANciclovir 450 MG tablet  Commonly known as: VALCYTE   Take 1 Tablet by mouth every day.  Dose: 450 mg           * This list has 2 medication(s) that are the same as other medications prescribed for you. Read the directions carefully, and ask your doctor or other care provider to review them with you.                  Allergies  No Known Allergies    DIET  No orders of the defined types were placed in this encounter.      ACTIVITY  As tolerated.  Weight bearing as tolerated    CONSULTATIONS  Dr. Villar Nephrology   Dr Vazquez     PROCEDURES      LABORATORY  Lab Results   Component Value Date    SODIUM 134 (L) 11/29/2022    POTASSIUM 5.3 11/29/2022    CHLORIDE 109 11/29/2022    CO2 17 (L) 11/29/2022    GLUCOSE 156 (H) 11/29/2022    BUN 16 11/29/2022    CREATININE 1.26 11/29/2022        Lab Results   Component Value Date    WBC 3.6 (L) 11/29/2022    HEMOGLOBIN 8.8 (L) 11/29/2022    HEMATOCRIT 28.2 (L) 11/29/2022    PLATELETCT 155 (L) 11/29/2022        Total time of the discharge process exceeds 50 minutes.

## 2022-11-29 NOTE — CARE PLAN
The patient is Stable - Low risk of patient condition declining or worsening    Shift Goals  Clinical Goals: Monitor VS  Patient Goals:  (comfort and rest)  Family Goals: dinner      Problem: Knowledge Deficit - Standard  Goal: Patient and family/care givers will demonstrate understanding of plan of care, disease process/condition, diagnostic tests and medications  Outcome: Progressing     Problem: Communication  Goal: The ability to communicate needs accurately and effectively will improve  Outcome: Progressing       Progress made toward(s) clinical / shift goals:  progressing    Patient is not progressing towards the following goals:

## 2022-11-29 NOTE — CARE PLAN
The patient is Stable - Low risk of patient condition declining or worsening    Shift Goals  Clinical Goals: Monitor VS, Cardiology consult  Patient Goals: control pain and nausea  Family Goals: dinner    Progress made toward(s) clinical / shift goals:  nausea and pain subsided.     Patient is not progressing towards the following goals: n/a

## 2022-11-29 NOTE — PROGRESS NOTES
Assumed care of patient Deann ROBLES. Pt is A+O x4. No chest pain or SOB. No active bleeding noted. Informed of safety and call system. rhythm is sinus kelby.  used during morning assessment. Pt has no concerns at this time.

## 2022-11-30 ENCOUNTER — PATIENT OUTREACH (OUTPATIENT)
Dept: MEDICAL GROUP | Facility: MEDICAL CENTER | Age: 72
End: 2022-11-30
Payer: MEDICARE

## 2022-11-30 LAB — TACROLIMUS BLD-MCNC: 6.5 NG/ML

## 2022-11-30 RX ORDER — FUROSEMIDE 40 MG/1
40 TABLET ORAL DAILY
Qty: 30 TABLET | Refills: 3 | Status: SHIPPED | OUTPATIENT
Start: 2022-11-30 | End: 2022-12-27 | Stop reason: SDUPTHER

## 2022-11-30 RX ORDER — LOSARTAN POTASSIUM 25 MG/1
25 TABLET ORAL DAILY
Qty: 30 TABLET | Refills: 3 | Status: SHIPPED | OUTPATIENT
Start: 2022-11-30 | End: 2022-12-27 | Stop reason: SDUPTHER

## 2022-11-30 NOTE — PROGRESS NOTES
Subjective:     Tere Gallegos is a 71 y.o. female who presents for Hospital Follow-up.    POST DISCHARGE CALL:  Discharge Date:11/29/2022   Date of Outreach Call: 11/30/2022  8:58 AM  Now that you're home, how are you doing? Fair  Did you receive any new prescriptions? Yes  Were you able to fill those medications? No  How did you fill those prescriptions? Pharmacy  If not able to fill prescriptions, why? Other (Comment)  Comment:not ready from pharmacy yet, rxs sent today    Do you have questions about your medications? No  Do you have a follow-up appointment scheduled?Yes  Any issues or paperwork you wish to discuss with your PCP? no  Does patient qualify for CCM Program? Yes  If patient qualifies, was CCM Program Introduced? No  If patient does not qualify, comment? does not qualify  Number of Attempts: 1  Current or previous attempts completed within two business days of discharge? Yes  Provided education regarding treatment plan, medication, self-management, ADLs? Yes  Has patient completed Advance Directive? If yes, advise them to bring to appointment. No  Care Manager phone number provided? No  Is there anything else I can help you with? No  Chief Complaint? L chest pain  Admitting Dx? Afib w/ RVR  Discharge Diagnosis? Afib w/ RVR  Additional Comments? attended dialysis today. pt is feeing better    HPI:   Recently hospitalized for Renal transplant failure, NSTEMI    Current medicines (including reconciliation performed today)  Current Outpatient Medications   Medication Sig Dispense Refill    furosemide (LASIX) 40 MG Tab Take 1 Tablet by mouth every day. 30 Tablet 3    losartan (COZAAR) 25 MG Tab Take 1 Tablet by mouth every day. 30 Tablet 3    valGANciclovir (VALCYTE) 450 MG tablet Take 1 Tablet by mouth every day.      amiodarone (CORDARONE) 200 MG Tab Take 1 Tablet by mouth every day. 30 Tablet 3    sodium bicarbonate (SODIUM BICARBONATE) 650 MG Tab Take 2 Tablets by mouth in the morning, at  noon, and at bedtime. 120 Tablet 3    fluticasone (FLONASE) 50 MCG/ACT nasal spray ADMINISTER 1 SPRAY INTO AFFECTED NOSTRIL(S) EVERY DAY. 16 mL 1    insulin glargine 100 UNIT/ML SC SOLN Inject 8 Units under the skin every day.      insulin aspart (NOVOLOG FLEXPEN) 100 UNIT/ML injection PEN Inject 2-8 Units under the skin 3 times a day before meals. Sliding Scale      HYDROcodone-acetaminophen (NORCO) 5-325 MG Tab per tablet Take 1 Tablet by mouth every 6 hours as needed. Indications: Pain      acetaminophen (TYLENOL) 500 MG Tab Take 500-1,000 mg by mouth every 6 hours as needed. Indications: Pain      levothyroxine (SYNTHROID) 50 MCG Tab Take 50 mcg by mouth every morning on an empty stomach.      TRUE METRIX BLOOD GLUCOSE TEST strip TEST DAILY 100 Strip 3    predniSONE (DELTASONE) 5 MG Tab Take 2 Tablets by mouth every day.      carvedilol (COREG) 3.125 MG Tab Take 1 Tablet by mouth 2 times a day with meals.      pantoprazole (PROTONIX) 40 MG Tablet Delayed Response Take 1 Tablet by mouth every day.      atorvastatin (LIPITOR) 20 MG Tab Take 1 Tablet by mouth every evening.      clotrimazole (MYCELEX) 10 MG Thanh thanh Take 1 Thanh by mouth 4 times a day.      mycophenolate (CELLCEPT) 500 MG tablet Take 2 Tablets by mouth 2 times a day.      sulfamethoxazole-trimethoprim (BACTRIM) 400-80 MG Tab Take 1 Tablet by mouth every day. Pt started on 11/1/2022 for 3 month course, per pts son      tacrolimus (PROGRAF) 1 MG Cap Take 1 Capsule by mouth 2 times a day.      aspirin EC (ECOTRIN) 81 MG Tablet Delayed Response Take 1 Tablet by mouth every day.      Blood Glucose Meter Kit Test blood sugar as recommended by provider. Freestyle Pearl blood glucose monitoring kit. 1 Kit 0    Blood Glucose Test Strips Use one Freestyle Pearl strip to test blood sugar once daily . 100 Strip 0    Lancets Use one Freestyle Pearl lancet to test blood sugar once daily . 100 Each 0    Alcohol Swabs Wipe site with prep pad prior to  "injection. 100 Each 0    apixaban (ELIQUIS) 2.5mg Tab Take 1 Tablet by mouth 2 times a day. Indications: Thromboembolism secondary to Atrial Fibrillation 180 Tablet 3     No current facility-administered medications for this visit.       Allergies:   Patient has no known allergies.    Social History     Tobacco Use    Smoking status: Never    Smokeless tobacco: Never   Vaping Use    Vaping Use: Never used   Substance Use Topics    Alcohol use: No     Alcohol/week: 0.0 oz    Drug use: No       ROS:  + RLQ incision site pain, weakness, poor appetite     Objective:     Vitals:    12/08/22 1438   BP: 114/50   BP Location: Right arm   Patient Position: Sitting   BP Cuff Size: Adult   Pulse: 64   Temp: 36.5 °C (97.7 °F)   TempSrc: Temporal   SpO2: 98%   Weight: 55.4 kg (122 lb 3.2 oz)   Height: 1.549 m (5' 1\")     Body mass index is 23.09 kg/m².    Physical Exam:  Constitutional: Alert, no distress.  Skin: Warm, dry, good turgor, no rashes in visible areas.  Eye: Equal, round and reactive, conjunctiva clear, lids normal.  ENMT: Lips without lesions, good dentition, oropharynx clear.  Neck: Trachea midline, no masses, no thyromegaly. No cervical or supraclavicular lymphadenopathy  Respiratory: Unlabored respiratory effort, lungs clear to auscultation, no wheezes, no ronchi.  Cardiovascular: Normal S1, S2, no murmur, no edema.  Abdomen: Soft, RLQ tenderness  Psych: Alert and oriented x3, normal affect and mood.      Assessment and Plan:     1. Hospital discharge follow-up  Doing fairly well post discharge, Needs to schedule follow up with nephrology and endocrinology    2. S/p cadaver renal transplant  Stable  Continue medications per nephrology  UA positive for small blood, otherwise unremarkable, will check culture  - POCT Urinalysis  - sodium bicarbonate (SODIUM BICARBONATE) 650 MG Tab; Take 2 Tablets by mouth in the morning, at noon, and at bedtime.  Dispense: 120 Tablet; Refill: 3    3. NSTEMI (non-ST elevated " myocardial infarction) (HCC)  Stable post discharge  Continue medication and follow up per cardiology    4. Lower abdominal pain  Unstable  Likely constipation from opioid use, restart OTC stool softener  UA with small blood, check culture  - POCT Urinalysis  - URINE CULTURE(NEW); Future    5. Urinary frequency  - POCT Urinalysis  - URINE CULTURE(NEW); Future    6. Controlled type 2 diabetes mellitus with chronic kidney disease on chronic dialysis, with long-term current use of insulin ([facility])  Stable  Last A1C 5.6  Continue current medication, currently on Insulin post trasplant, needs follow up ASAP with endocrinology  - glucose blood (TRUE METRIX BLOOD GLUCOSE TEST) strip; 1 Strip by Other route 3 times a day with meals.  Dispense: 300 Strip; Refill: 3  - Lancets; Use one Freestyle Pearl lancet to test blood sugar once daily .  Dispense: 300 Each; Refill: 3    7. Long-term insulin use (HCC)  - glucose blood (TRUE METRIX BLOOD GLUCOSE TEST) strip; 1 Strip by Other route 3 times a day with meals.  Dispense: 300 Strip; Refill: 3  - Lancets; Use one Freestyle Pearl lancet to test blood sugar once daily .  Dispense: 300 Each; Refill: 3      Other orders  - carvedilol (COREG) 6.25 MG Tab; Take 6.25 mg by mouth 2 times a day.    - Chart and discharge summary were reviewed.   - Hospitalization and results reviewed with patient.   - Medications reviewed including instructions regarding high risk medications, dosing and side effects.  - Recommended Services: Referral to [facility] Care Coordination Services  - Advance directive/POLST on file?  No     Follow-up:Return in about 4 weeks (around 1/5/2023).    Face-to-face transitional care management services with HIGH (today's visit is within days post discharge & LACE+ score 59+) medical decision complexity were provided.     LACE+ Historical Score Over Time (0-28: Low, 29-58: Medium, 59+: High): 64      Critical Care

## 2022-11-30 NOTE — PROGRESS NOTES
Nephrology Daily Progress Note    Date of Service  2022    Chief Complaint  71 y.o. female with history of ESRD status post  donor kidney transplant 10/31/2022 at [facility] notable for delayed graft function requiring dialysis until 2022, diabetes, hypertension who presented 2022 with chest pain, Afib with RVR.    Interval Problem Update   -patient urinating well.  Denies chest pain, shortness of breath.  Remains in normal rhythm.    Review of Systems  Review of Systems   Constitutional:  Negative for fever.   Respiratory:  Negative for shortness of breath.    Cardiovascular:  Negative for chest pain.   Gastrointestinal:  Negative for abdominal pain.   All other systems reviewed and are negative.     Physical Exam  Temp:  [36.7 °C (98.1 °F)] 36.7 °C (98.1 °F)  Pulse:  [50-68] 61  Resp:  [16-18] 17  BP: (131-167)/(43-59) 153/53  SpO2:  [96 %-97 %] 96 %    Physical Exam  Constitutional:       General: She is not in acute distress.     Appearance: She is well-developed.   HENT:      Head: Normocephalic and atraumatic.      Mouth/Throat:      Mouth: Mucous membranes are moist.      Pharynx: Oropharynx is clear. No oropharyngeal exudate.   Eyes:      General: No scleral icterus.     Extraocular Movements: Extraocular movements intact.   Neck:      Trachea: No tracheal deviation.   Cardiovascular:      Rate and Rhythm: Normal rate and regular rhythm.      Heart sounds: Normal heart sounds. No murmur heard.  Pulmonary:      Effort: Pulmonary effort is normal.      Breath sounds: No stridor. No rales.   Abdominal:      Palpations: Abdomen is soft.      Tenderness: There is no abdominal tenderness.   Musculoskeletal:         General: Normal range of motion.      Cervical back: Normal range of motion. No rigidity.      Right lower leg: No edema.      Left lower leg: No edema.   Skin:     General: Skin is warm and dry.   Neurological:      General: No focal deficit present.      Mental Status:  She is alert and oriented to person, place, and time.   Psychiatric:         Mood and Affect: Mood normal.         Behavior: Behavior normal.   Dialysis access: Left upper arm AV fistula, patent bruit and thrill    Fluids    Intake/Output Summary (Last 24 hours) at 11/29/2022 1757  Last data filed at 11/29/2022 1300  Gross per 24 hour   Intake 440 ml   Output --   Net 440 ml       Laboratory  Labs reviewed, pertinent labs below.  Recent Labs     11/27/22  1102 11/28/22  0142 11/29/22  0905   WBC 3.5* 3.2* 3.6*   RBC 3.10* 2.91* 2.94*   HEMOGLOBIN 9.3* 8.9* 8.8*   HEMATOCRIT 29.4* 28.0* 28.2*   MCV 94.8 96.2 95.9   MCH 30.0 30.6 29.9   MCHC 31.6* 31.8* 31.2*   RDW 58.1* 59.7* 60.1*   PLATELETCT 168 161* 155*   MPV 10.7 11.5 11.1     Recent Labs     11/27/22  1102 11/28/22  0142 11/28/22  0526 11/29/22  0905   SODIUM 137 133*  --  134*   POTASSIUM 4.7 5.9* 5.6* 5.3   CHLORIDE 109 109  --  109   CO2 18* 14*  --  17*   GLUCOSE 138* 148*  --  156*   BUN 18 19  --  16   CREATININE 1.11 1.13  --  1.26   CALCIUM 10.2 9.8  --  10.0     Recent Labs     11/27/22  1102   APTT 29.4   INR 1.31*           URINALYSIS:  Lab Results   Component Value Date/Time    COLORURINE Yellow 11/27/2022 1230    CLARITY Clear 11/27/2022 1230    SPECGRAVITY 1.010 11/27/2022 1230    PHURINE 6.0 11/27/2022 1230    KETONES Negative 11/27/2022 1230    PROTEINURIN Negative 11/27/2022 1230    BILIRUBINUR Negative 11/27/2022 1230    NITRITE Negative 11/27/2022 1230    LEUKESTERAS Negative 11/27/2022 1230    OCCULTBLOOD Moderate (A) 11/27/2022 1230     UPC  No results found for: TOTPROTUR No results found for: CREATININEU      Imaging interpreted by radiologist. Imaging reports reviewed with pertinent findings below  EC-ECHOCARDIOGRAM COMPLETE W/O CONT   Final Result            Current Facility-Administered Medications   Medication Dose Route Frequency Provider Last Rate Last Admin    amiodarone (Cordarone) tablet 200 mg  200 mg Oral Q DAY Tate Ly,  M.D.   200 mg at 11/29/22 0548    hydrALAZINE (APRESOLINE) injection 20 mg  20 mg Intravenous Q6HRS PRN Abhijeet Johnston M.D.   20 mg at 11/28/22 1007    sodium bicarbonate tablet 1,300 mg  1,300 mg Oral TID Priyank Villar M.D.   1,300 mg at 11/29/22 1528    apixaban (ELIQUIS) tablet 5 mg  5 mg Oral BID Jesus Alberto Vazquez M.D.   5 mg at 11/29/22 1712    senna-docusate (PERICOLACE or SENOKOT S) 8.6-50 MG per tablet 2 Tablet  2 Tablet Oral BID Timbo Griffin M.D.   2 Tablet at 11/29/22 0547    And    polyethylene glycol/lytes (MIRALAX) PACKET 1 Packet  1 Packet Oral QDAY PRN Timbo Griffin M.D.        And    magnesium hydroxide (MILK OF MAGNESIA) suspension 30 mL  30 mL Oral QDAY SHAMIR Griffin M.D.   30 mL at 11/28/22 0548    And    bisacodyl (DULCOLAX) suppository 10 mg  10 mg Rectal QDAY PRN Timbo Griffin M.D.        acetaminophen (Tylenol) tablet 650 mg  650 mg Oral Q6HRS PRN Timbo Griffin M.D.        Pharmacy Consult Request ...Pain Management Review 1 Each  1 Each Other PHARMACY TO DOSE Timbo Griffin M.D.        oxyCODONE immediate-release (ROXICODONE) tablet 2.5 mg  2.5 mg Oral Q3HRS PRN Timbo Griffin M.D.   2.5 mg at 11/28/22 0905    Or    oxyCODONE immediate-release (ROXICODONE) tablet 5 mg  5 mg Oral Q3HRS PRN Timbo Griffin M.D.        ondansetron (ZOFRAN) syringe/vial injection 4 mg  4 mg Intravenous Q4HRS PRN Timbo Griffin M.D.   4 mg at 11/28/22 0902    ondansetron (ZOFRAN ODT) dispertab 4 mg  4 mg Oral Q4HRS PRN Timbo Griffin M.D.        insulin regular (HumuLIN R,NovoLIN R) injection  1-6 Units Subcutaneous Q6HRS Timbo Griffin M.D.   1 Units at 11/29/22 1322    And    dextrose 10 % BOLUS 25 g  25 g Intravenous Q15 MIN PRN Timbo Griffin M.D.        atorvastatin (LIPITOR) tablet 20 mg  20 mg Oral Nightly Timbo Griffin M.D.   20 mg at 11/28/22 2113    levothyroxine (SYNTHROID) tablet 50 mcg  50 mcg Oral AM ES Timbo Griffin M.D.   50 mcg at  22 0548    mycophenolate (CELLCEPT) capsule 1,000 mg  1,000 mg Oral Q12HRS Timbo Griffin M.D.   1,000 mg at 22 1712    omeprazole (PRILOSEC) capsule 40 mg  40 mg Oral DAILY Timbo Griffin M.D.   40 mg at 22 0547    predniSONE (DELTASONE) tablet 10 mg  10 mg Oral DAILY Timbo Griffin M.D.   10 mg at 22 0547    [Held by provider] sulfamethoxazole-trimethoprim (BACTRIM) 400-80 MG per tablet 1 Tablet  1 Tablet Oral DAILY Timbo Griffin M.D.   1 Tablet at 22 0608    tacrolimus (PROGRAF) capsule 1 mg  1 mg Oral Q12HRS Timbo Griffin M.D.   1 mg at 22 1712    valGANciclovir (VALCYTE) tablet 450 mg  450 mg Oral DAILY Timbo Griffin M.D.   450 mg at 22 0548    morphine 4 MG/ML injection 2 mg  2 mg Intravenous Q3HRS PRN Timbo Griffin M.D.        clotrimazole (Mycelex) eli 10 mg  10 mg Oral 4XDAY Timbo Griffin M.D.   10 mg at 22 1712         Assessment/Plan  71 y.o. female with history of ESRD status post  donor kidney transplant 10/31/2022 at [facility] notable for delayed graft function requiring dialysis until 2022, diabetes, hypertension who presented 2022 with chest pain, Afib with RVR.     1.  ESRD status post  donor kidney transplant 10/31/2022, with transplant CKD 3A.  Patient has excellent functioning kidney allograft.  Avoid nephrotoxins.  Check labs daily while inpatient.    2.  Hyperkalemia, improved.  Likely from metabolic acidosis and Bactrim.  Okay to resume Bactrim.  Continue bicarbonate supplements as below.  Recommend adding Lasix 40 mg p.o. daily.    3.  Metabolic acidosis, unclear etiology, persistent.  Continue sodium bicarbonate 1300 mg p.o. twice daily.    4.  Hyponatremia, mild.  Limit hypotonic fluids.  Check labs daily.    5.  Pancytopenia, persistent, likely medication induced from immunosuppression.  Mild.  Continue immunosuppression as below.    6.  Immunosuppression, stable, continue  mycophenolate 1000 mg p.o. twice daily, tacrolimus 1 mg p.o. twice daily, prednisone 10 mg daily.  Trough tacrolimus level pending.  If pancytopenia worsens, I would recommend decreasing mycophenolate to 750 mg p.o. twice daily, but I would confirm this with transplant at [facility] before making any changes.    7.  Paroxysmal atrial fibrillation, stable.  Agree with full dose apixaban 5 mg p.o. twice daily.    As patient's kidney function is stable, nephrology will sign off.  Please call back with further questions or concerns.  Patient should follow-up with nephrology at [facility] until she is released back to general nephrology.    Priyank Villar MD  Nephrology  [facility]

## 2022-12-01 ENCOUNTER — HOSPITAL ENCOUNTER (OUTPATIENT)
Dept: LAB | Facility: MEDICAL CENTER | Age: 72
End: 2022-12-01
Attending: INTERNAL MEDICINE
Payer: MEDICARE

## 2022-12-01 LAB
ANION GAP SERPL CALC-SCNC: 9 MMOL/L (ref 7–16)
BASOPHILS # BLD AUTO: 0.2 % (ref 0–1.8)
BASOPHILS # BLD: 0.01 K/UL (ref 0–0.12)
BUN SERPL-MCNC: 15 MG/DL (ref 8–22)
CALCIUM SERPL-MCNC: 10 MG/DL (ref 8.4–10.2)
CHLORIDE SERPL-SCNC: 107 MMOL/L (ref 96–112)
CO2 SERPL-SCNC: 21 MMOL/L (ref 20–33)
CREAT SERPL-MCNC: 1.27 MG/DL (ref 0.5–1.4)
EOSINOPHIL # BLD AUTO: 0.03 K/UL (ref 0–0.51)
EOSINOPHIL NFR BLD: 0.7 % (ref 0–6.9)
ERYTHROCYTE [DISTWIDTH] IN BLOOD BY AUTOMATED COUNT: 60.7 FL (ref 35.9–50)
GFR SERPLBLD CREATININE-BSD FMLA CKD-EPI: 45 ML/MIN/1.73 M 2
GLUCOSE SERPL-MCNC: 85 MG/DL (ref 65–99)
HCT VFR BLD AUTO: 27.8 % (ref 37–47)
HGB BLD-MCNC: 8.8 G/DL (ref 12–16)
IMM GRANULOCYTES # BLD AUTO: 0.01 K/UL (ref 0–0.11)
IMM GRANULOCYTES NFR BLD AUTO: 0.2 % (ref 0–0.9)
LYMPHOCYTES # BLD AUTO: 0.53 K/UL (ref 1–4.8)
LYMPHOCYTES NFR BLD: 12.8 % (ref 22–41)
MCH RBC QN AUTO: 30.4 PG (ref 27–33)
MCHC RBC AUTO-ENTMCNC: 31.7 G/DL (ref 33.6–35)
MCV RBC AUTO: 96.2 FL (ref 81.4–97.8)
MONOCYTES # BLD AUTO: 0.19 K/UL (ref 0–0.85)
MONOCYTES NFR BLD AUTO: 4.6 % (ref 0–13.4)
NEUTROPHILS # BLD AUTO: 3.36 K/UL (ref 2–7.15)
NEUTROPHILS NFR BLD: 81.5 % (ref 44–72)
NRBC # BLD AUTO: 0 K/UL
NRBC BLD-RTO: 0 /100 WBC
PHOSPHATE SERPL-MCNC: 1.8 MG/DL (ref 2.5–4.5)
PLATELET # BLD AUTO: 152 K/UL (ref 164–446)
PMV BLD AUTO: 10.9 FL (ref 9–12.9)
POTASSIUM SERPL-SCNC: 4.9 MMOL/L (ref 3.6–5.5)
RBC # BLD AUTO: 2.89 M/UL (ref 4.2–5.4)
SODIUM SERPL-SCNC: 137 MMOL/L (ref 135–145)
WBC # BLD AUTO: 4.1 K/UL (ref 4.8–10.8)

## 2022-12-01 PROCEDURE — 80048 BASIC METABOLIC PNL TOTAL CA: CPT

## 2022-12-01 PROCEDURE — 80197 ASSAY OF TACROLIMUS: CPT

## 2022-12-01 PROCEDURE — 85025 COMPLETE CBC W/AUTO DIFF WBC: CPT

## 2022-12-01 PROCEDURE — 36415 COLL VENOUS BLD VENIPUNCTURE: CPT

## 2022-12-01 PROCEDURE — 84100 ASSAY OF PHOSPHORUS: CPT

## 2022-12-02 LAB
BACTERIA BLD CULT: NORMAL
BACTERIA BLD CULT: NORMAL
SIGNIFICANT IND 70042: NORMAL
SIGNIFICANT IND 70042: NORMAL
SITE SITE: NORMAL
SITE SITE: NORMAL
SOURCE SOURCE: NORMAL
SOURCE SOURCE: NORMAL
TACROLIMUS BLD-MCNC: 4.8 NG/ML

## 2022-12-05 ENCOUNTER — HOSPITAL ENCOUNTER (OUTPATIENT)
Dept: LAB | Facility: MEDICAL CENTER | Age: 72
End: 2022-12-05
Attending: INTERNAL MEDICINE
Payer: MEDICARE

## 2022-12-05 LAB
ALBUMIN SERPL BCP-MCNC: 4.1 G/DL (ref 3.2–4.9)
ALBUMIN/GLOB SERPL: 1.6 G/DL
ALP SERPL-CCNC: 112 U/L (ref 30–99)
ALT SERPL-CCNC: 7 U/L (ref 2–50)
ANION GAP SERPL CALC-SCNC: 8 MMOL/L (ref 7–16)
APPEARANCE UR: CLEAR
AST SERPL-CCNC: 12 U/L (ref 12–45)
BACTERIA #/AREA URNS HPF: ABNORMAL /HPF
BASOPHILS # BLD AUTO: 0.6 % (ref 0–1.8)
BASOPHILS # BLD: 0.02 K/UL (ref 0–0.12)
BILIRUB SERPL-MCNC: 0.4 MG/DL (ref 0.1–1.5)
BILIRUB UR QL STRIP.AUTO: NEGATIVE
BUN SERPL-MCNC: 18 MG/DL (ref 8–22)
CALCIUM SERPL-MCNC: 10.1 MG/DL (ref 8.4–10.2)
CHLORIDE SERPL-SCNC: 108 MMOL/L (ref 96–112)
CO2 SERPL-SCNC: 20 MMOL/L (ref 20–33)
COLOR UR: YELLOW
CREAT SERPL-MCNC: 1.34 MG/DL (ref 0.5–1.4)
EOSINOPHIL # BLD AUTO: 0.02 K/UL (ref 0–0.51)
EOSINOPHIL NFR BLD: 0.6 % (ref 0–6.9)
EPI CELLS #/AREA URNS HPF: ABNORMAL /HPF
ERYTHROCYTE [DISTWIDTH] IN BLOOD BY AUTOMATED COUNT: 60.9 FL (ref 35.9–50)
FASTING STATUS PATIENT QL REPORTED: NORMAL
GFR SERPLBLD CREATININE-BSD FMLA CKD-EPI: 42 ML/MIN/1.73 M 2
GLOBULIN SER CALC-MCNC: 2.5 G/DL (ref 1.9–3.5)
GLUCOSE SERPL-MCNC: 87 MG/DL (ref 65–99)
GLUCOSE UR STRIP.AUTO-MCNC: NEGATIVE MG/DL
HCT VFR BLD AUTO: 31.8 % (ref 37–47)
HGB BLD-MCNC: 10 G/DL (ref 12–16)
IMM GRANULOCYTES # BLD AUTO: 0.01 K/UL (ref 0–0.11)
IMM GRANULOCYTES NFR BLD AUTO: 0.3 % (ref 0–0.9)
KETONES UR STRIP.AUTO-MCNC: NEGATIVE MG/DL
LEUKOCYTE ESTERASE UR QL STRIP.AUTO: NEGATIVE
LYMPHOCYTES # BLD AUTO: 0.54 K/UL (ref 1–4.8)
LYMPHOCYTES NFR BLD: 15.2 % (ref 22–41)
MCH RBC QN AUTO: 30.4 PG (ref 27–33)
MCHC RBC AUTO-ENTMCNC: 31.4 G/DL (ref 33.6–35)
MCV RBC AUTO: 96.7 FL (ref 81.4–97.8)
MICRO URNS: ABNORMAL
MONOCYTES # BLD AUTO: 0.14 K/UL (ref 0–0.85)
MONOCYTES NFR BLD AUTO: 3.9 % (ref 0–13.4)
NEUTROPHILS # BLD AUTO: 2.82 K/UL (ref 2–7.15)
NEUTROPHILS NFR BLD: 79.4 % (ref 44–72)
NITRITE UR QL STRIP.AUTO: NEGATIVE
NRBC # BLD AUTO: 0 K/UL
NRBC BLD-RTO: 0 /100 WBC
PH UR STRIP.AUTO: 5.5 [PH] (ref 5–8)
PLATELET # BLD AUTO: 145 K/UL (ref 164–446)
PMV BLD AUTO: 11.2 FL (ref 9–12.9)
POTASSIUM SERPL-SCNC: 5 MMOL/L (ref 3.6–5.5)
PROT SERPL-MCNC: 6.6 G/DL (ref 6–8.2)
PROT UR QL STRIP: NEGATIVE MG/DL
RBC # BLD AUTO: 3.29 M/UL (ref 4.2–5.4)
RBC # URNS HPF: ABNORMAL /HPF
RBC UR QL AUTO: ABNORMAL
SODIUM SERPL-SCNC: 136 MMOL/L (ref 135–145)
SP GR UR STRIP.AUTO: 1.01
WBC # BLD AUTO: 3.6 K/UL (ref 4.8–10.8)
WBC #/AREA URNS HPF: ABNORMAL /HPF

## 2022-12-05 PROCEDURE — 87517 HEPATITIS B DNA QUANT: CPT

## 2022-12-05 PROCEDURE — 81001 URINALYSIS AUTO W/SCOPE: CPT

## 2022-12-05 PROCEDURE — 87522 HEPATITIS C REVRS TRNSCRPJ: CPT

## 2022-12-05 PROCEDURE — 80053 COMPREHEN METABOLIC PANEL: CPT

## 2022-12-05 PROCEDURE — 80197 ASSAY OF TACROLIMUS: CPT

## 2022-12-05 PROCEDURE — 87536 HIV-1 QUANT&REVRSE TRNSCRPJ: CPT

## 2022-12-05 PROCEDURE — 85025 COMPLETE CBC W/AUTO DIFF WBC: CPT

## 2022-12-05 PROCEDURE — 36415 COLL VENOUS BLD VENIPUNCTURE: CPT

## 2022-12-05 PROCEDURE — 87799 DETECT AGENT NOS DNA QUANT: CPT

## 2022-12-07 LAB
HCV RNA SERPL NAA+PROBE-ACNC: NOT DETECTED IU/ML
HCV RNA SERPL NAA+PROBE-LOG IU: NOT DETECTED LOG IU/ML
HCV RNA SERPL QL NAA+PROBE: NOT DETECTED
TACROLIMUS BLD-MCNC: 13 NG/ML

## 2022-12-08 ENCOUNTER — OFFICE VISIT (OUTPATIENT)
Dept: MEDICAL GROUP | Facility: MEDICAL CENTER | Age: 72
End: 2022-12-08
Payer: MEDICARE

## 2022-12-08 ENCOUNTER — HOSPITAL ENCOUNTER (OUTPATIENT)
Dept: LAB | Facility: MEDICAL CENTER | Age: 72
End: 2022-12-08
Payer: MEDICARE

## 2022-12-08 ENCOUNTER — HOSPITAL ENCOUNTER (OUTPATIENT)
Facility: MEDICAL CENTER | Age: 72
End: 2022-12-08
Attending: NURSE PRACTITIONER
Payer: MEDICARE

## 2022-12-08 VITALS
WEIGHT: 122.2 LBS | HEIGHT: 61 IN | OXYGEN SATURATION: 98 % | HEART RATE: 64 BPM | TEMPERATURE: 97.7 F | DIASTOLIC BLOOD PRESSURE: 50 MMHG | SYSTOLIC BLOOD PRESSURE: 114 MMHG | BODY MASS INDEX: 23.07 KG/M2

## 2022-12-08 DIAGNOSIS — E11.22 CONTROLLED TYPE 2 DIABETES MELLITUS WITH CHRONIC KIDNEY DISEASE ON CHRONIC DIALYSIS, WITH LONG-TERM CURRENT USE OF INSULIN (HCC): ICD-10-CM

## 2022-12-08 DIAGNOSIS — Z99.2 CONTROLLED TYPE 2 DIABETES MELLITUS WITH CHRONIC KIDNEY DISEASE ON CHRONIC DIALYSIS, WITH LONG-TERM CURRENT USE OF INSULIN (HCC): ICD-10-CM

## 2022-12-08 DIAGNOSIS — I21.4 NSTEMI (NON-ST ELEVATED MYOCARDIAL INFARCTION) (HCC): ICD-10-CM

## 2022-12-08 DIAGNOSIS — R10.30 LOWER ABDOMINAL PAIN: ICD-10-CM

## 2022-12-08 DIAGNOSIS — Z94.0 S/P CADAVER RENAL TRANSPLANT: ICD-10-CM

## 2022-12-08 DIAGNOSIS — R35.0 URINARY FREQUENCY: ICD-10-CM

## 2022-12-08 DIAGNOSIS — I50.31 ACUTE DIASTOLIC CHF (CONGESTIVE HEART FAILURE) (HCC): ICD-10-CM

## 2022-12-08 DIAGNOSIS — Z79.4 CONTROLLED TYPE 2 DIABETES MELLITUS WITH CHRONIC KIDNEY DISEASE ON CHRONIC DIALYSIS, WITH LONG-TERM CURRENT USE OF INSULIN (HCC): ICD-10-CM

## 2022-12-08 DIAGNOSIS — N18.6 CONTROLLED TYPE 2 DIABETES MELLITUS WITH CHRONIC KIDNEY DISEASE ON CHRONIC DIALYSIS, WITH LONG-TERM CURRENT USE OF INSULIN (HCC): ICD-10-CM

## 2022-12-08 DIAGNOSIS — Z79.4 LONG-TERM INSULIN USE (HCC): ICD-10-CM

## 2022-12-08 DIAGNOSIS — Z09 HOSPITAL DISCHARGE FOLLOW-UP: ICD-10-CM

## 2022-12-08 LAB
AMBIGUOUS DTTM AMBI4: NORMAL
ANION GAP SERPL CALC-SCNC: 8 MMOL/L (ref 7–16)
APPEARANCE UR: CLEAR
BASOPHILS # BLD AUTO: 0.5 % (ref 0–1.8)
BASOPHILS # BLD: 0.02 K/UL (ref 0–0.12)
BILIRUB UR STRIP-MCNC: NORMAL MG/DL
BKV DNA # SPEC NAA+PROBE: <390 CPY/ML
BKV DNA # UR NAA+PROBE: ABNORMAL CPY/ML
BKV DNA SPEC NAA+PROBE-LOG#: <2.6 LOG
BKV DNA SPEC NAA+PROBE-LOG#: ABNORMAL LOG CPY/ML
BUN SERPL-MCNC: 20 MG/DL (ref 8–22)
CALCIUM SERPL-MCNC: 10.3 MG/DL (ref 8.4–10.2)
CHLORIDE SERPL-SCNC: 109 MMOL/L (ref 96–112)
CO2 SERPL-SCNC: 19 MMOL/L (ref 20–33)
COLOR UR AUTO: YELLOW
CREAT SERPL-MCNC: 1.17 MG/DL (ref 0.5–1.4)
DIAGNOSTIC IMP SPEC-IMP: DETECTED
DIAGNOSTIC IMP SPEC-IMP: NOT DETECTED
EOSINOPHIL # BLD AUTO: 0.02 K/UL (ref 0–0.51)
EOSINOPHIL NFR BLD: 0.5 % (ref 0–6.9)
ERYTHROCYTE [DISTWIDTH] IN BLOOD BY AUTOMATED COUNT: 60.9 FL (ref 35.9–50)
GFR SERPLBLD CREATININE-BSD FMLA CKD-EPI: 49 ML/MIN/1.73 M 2
GLUCOSE SERPL-MCNC: 94 MG/DL (ref 65–99)
GLUCOSE UR STRIP.AUTO-MCNC: NORMAL MG/DL
HBV DNA SERPL NAA+PROBE-ACNC: NOT DETECTED IU/ML
HBV DNA SERPL NAA+PROBE-LOG IU: NOT DETECTED LOG IU/ML
HBV DNA SERPL QL NAA+PROBE: NOT DETECTED
HCT VFR BLD AUTO: 31.1 % (ref 37–47)
HGB BLD-MCNC: 9.7 G/DL (ref 12–16)
IMM GRANULOCYTES # BLD AUTO: 0.01 K/UL (ref 0–0.11)
IMM GRANULOCYTES NFR BLD AUTO: 0.3 % (ref 0–0.9)
KETONES UR STRIP.AUTO-MCNC: NORMAL MG/DL
LEUKOCYTE ESTERASE UR QL STRIP.AUTO: NORMAL
LYMPHOCYTES # BLD AUTO: 0.72 K/UL (ref 1–4.8)
LYMPHOCYTES NFR BLD: 18.8 % (ref 22–41)
MCH RBC QN AUTO: 30.2 PG (ref 27–33)
MCHC RBC AUTO-ENTMCNC: 31.2 G/DL (ref 33.6–35)
MCV RBC AUTO: 96.9 FL (ref 81.4–97.8)
MONOCYTES # BLD AUTO: 0.2 K/UL (ref 0–0.85)
MONOCYTES NFR BLD AUTO: 5.2 % (ref 0–13.4)
NEUTROPHILS # BLD AUTO: 2.86 K/UL (ref 2–7.15)
NEUTROPHILS NFR BLD: 74.7 % (ref 44–72)
NITRITE UR QL STRIP.AUTO: NORMAL
NRBC # BLD AUTO: 0 K/UL
NRBC BLD-RTO: 0 /100 WBC
PH UR STRIP.AUTO: 5 [PH] (ref 5–8)
PHOSPHATE SERPL-MCNC: 1.8 MG/DL (ref 2.5–4.5)
PLATELET # BLD AUTO: 152 K/UL (ref 164–446)
PMV BLD AUTO: 11.9 FL (ref 9–12.9)
POTASSIUM SERPL-SCNC: 5.7 MMOL/L (ref 3.6–5.5)
PROT UR QL STRIP: NORMAL MG/DL
RBC # BLD AUTO: 3.21 M/UL (ref 4.2–5.4)
RBC UR QL AUTO: NORMAL
SODIUM SERPL-SCNC: 136 MMOL/L (ref 135–145)
SP GR UR STRIP.AUTO: 1020
UROBILINOGEN UR STRIP-MCNC: 0.2 MG/DL
WBC # BLD AUTO: 3.8 K/UL (ref 4.8–10.8)

## 2022-12-08 PROCEDURE — 85025 COMPLETE CBC W/AUTO DIFF WBC: CPT

## 2022-12-08 PROCEDURE — 84100 ASSAY OF PHOSPHORUS: CPT

## 2022-12-08 PROCEDURE — 80197 ASSAY OF TACROLIMUS: CPT

## 2022-12-08 PROCEDURE — 81002 URINALYSIS NONAUTO W/O SCOPE: CPT | Performed by: NURSE PRACTITIONER

## 2022-12-08 PROCEDURE — 36415 COLL VENOUS BLD VENIPUNCTURE: CPT

## 2022-12-08 PROCEDURE — 99215 OFFICE O/P EST HI 40 MIN: CPT | Performed by: NURSE PRACTITIONER

## 2022-12-08 PROCEDURE — 80048 BASIC METABOLIC PNL TOTAL CA: CPT

## 2022-12-08 PROCEDURE — 87086 URINE CULTURE/COLONY COUNT: CPT

## 2022-12-08 RX ORDER — LANCETS 30 GAUGE
EACH MISCELLANEOUS
Qty: 300 EACH | Refills: 3 | Status: ON HOLD | OUTPATIENT
Start: 2022-12-08 | End: 2023-09-23

## 2022-12-08 RX ORDER — CARVEDILOL 6.25 MG/1
6.25 TABLET ORAL 2 TIMES DAILY
COMMUNITY
Start: 2022-12-02 | End: 2022-12-27 | Stop reason: SDUPTHER

## 2022-12-08 RX ORDER — CALCIUM CITRATE/VITAMIN D3 200MG-6.25
1 TABLET ORAL
Qty: 300 STRIP | Refills: 3 | Status: SHIPPED | OUTPATIENT
Start: 2022-12-08 | End: 2023-01-03

## 2022-12-08 RX ORDER — SODIUM BICARBONATE 650 MG/1
1300 TABLET ORAL 3 TIMES DAILY
Qty: 120 TABLET | Refills: 3 | Status: SHIPPED | OUTPATIENT
Start: 2022-12-08 | End: 2023-03-31

## 2022-12-08 ASSESSMENT — FIBROSIS 4 INDEX: FIB4 SCORE: 2.15

## 2022-12-08 NOTE — ASSESSMENT & PLAN NOTE
Chronic. Established with endocrinology. Since recent discharge she is now on insulin, taking Novolog TID ACHS 2-8 units,

## 2022-12-08 NOTE — ASSESSMENT & PLAN NOTE
Having RLQ abdominal pain around incision site from renal transplant, pain feels deep and radiates to her left side. She took her pain medication yesterday and pain improved.     She reports constipation since surgery, was getting medication for constipation while in the hospital, was unsure if she should continue taking these. She still has this medication at home.     Last BM was yesterday, did not have that much stool produced. Has not had a BM today. No painful urination. She is urinating frequently during the day and night.     Denies fevers, chills, body aches, nausea, vomiting.

## 2022-12-10 LAB
HIV-1 NAAT (COPIES/ML) L204479A: NOT DETECTED CPY/ML
HIV-1 NAAT (LOG COPIES/ML) L295410: NOT DETECTED LOG CPY/ML
HIV1 RNA SERPL QL NAA+PROBE: NOT DETECTED
TACROLIMUS BLD-MCNC: 12.4 NG/ML

## 2022-12-11 LAB
BACTERIA UR CULT: NORMAL
SIGNIFICANT IND 70042: NORMAL
SITE SITE: NORMAL
SOURCE SOURCE: NORMAL

## 2022-12-12 ENCOUNTER — HOSPITAL ENCOUNTER (OUTPATIENT)
Dept: LAB | Facility: MEDICAL CENTER | Age: 72
End: 2022-12-12
Attending: INTERNAL MEDICINE
Payer: MEDICARE

## 2022-12-12 ENCOUNTER — HOSPITAL ENCOUNTER (EMERGENCY)
Facility: MEDICAL CENTER | Age: 72
End: 2022-12-12
Attending: EMERGENCY MEDICINE
Payer: MEDICARE

## 2022-12-12 ENCOUNTER — APPOINTMENT (OUTPATIENT)
Dept: RADIOLOGY | Facility: MEDICAL CENTER | Age: 72
End: 2022-12-12
Payer: MEDICARE

## 2022-12-12 VITALS
DIASTOLIC BLOOD PRESSURE: 61 MMHG | SYSTOLIC BLOOD PRESSURE: 125 MMHG | TEMPERATURE: 97.9 F | WEIGHT: 122.14 LBS | RESPIRATION RATE: 20 BRPM | BODY MASS INDEX: 23.06 KG/M2 | HEART RATE: 51 BPM | HEIGHT: 61 IN | OXYGEN SATURATION: 96 %

## 2022-12-12 DIAGNOSIS — R07.9 CHEST PAIN, UNSPECIFIED TYPE: ICD-10-CM

## 2022-12-12 DIAGNOSIS — I48.91 ATRIAL FIBRILLATION WITH RVR (HCC): ICD-10-CM

## 2022-12-12 LAB
ALBUMIN SERPL BCP-MCNC: 4.4 G/DL (ref 3.2–4.9)
ALBUMIN/GLOB SERPL: 1.7 G/DL
ALP SERPL-CCNC: 138 U/L (ref 30–99)
ALT SERPL-CCNC: 8 U/L (ref 2–50)
ANION GAP SERPL CALC-SCNC: 13 MMOL/L (ref 7–16)
ANION GAP SERPL CALC-SCNC: 9 MMOL/L (ref 7–16)
AST SERPL-CCNC: 15 U/L (ref 12–45)
BASOPHILS # BLD AUTO: 0.3 % (ref 0–1.8)
BASOPHILS # BLD AUTO: 0.4 % (ref 0–1.8)
BASOPHILS # BLD: 0.01 K/UL (ref 0–0.12)
BASOPHILS # BLD: 0.02 K/UL (ref 0–0.12)
BILIRUB SERPL-MCNC: 0.5 MG/DL (ref 0.1–1.5)
BUN SERPL-MCNC: 26 MG/DL (ref 8–22)
BUN SERPL-MCNC: 26 MG/DL (ref 8–22)
CALCIUM SERPL-MCNC: 10.1 MG/DL (ref 8.4–10.2)
CALCIUM SERPL-MCNC: 10.4 MG/DL (ref 8.5–10.5)
CHLORIDE SERPL-SCNC: 105 MMOL/L (ref 96–112)
CHLORIDE SERPL-SCNC: 108 MMOL/L (ref 96–112)
CO2 SERPL-SCNC: 17 MMOL/L (ref 20–33)
CO2 SERPL-SCNC: 20 MMOL/L (ref 20–33)
CREAT SERPL-MCNC: 1.44 MG/DL (ref 0.5–1.4)
CREAT SERPL-MCNC: 1.48 MG/DL (ref 0.5–1.4)
EKG IMPRESSION: NORMAL
EOSINOPHIL # BLD AUTO: 0.01 K/UL (ref 0–0.51)
EOSINOPHIL # BLD AUTO: 0.02 K/UL (ref 0–0.51)
EOSINOPHIL NFR BLD: 0.2 % (ref 0–6.9)
EOSINOPHIL NFR BLD: 0.5 % (ref 0–6.9)
ERYTHROCYTE [DISTWIDTH] IN BLOOD BY AUTOMATED COUNT: 60.1 FL (ref 35.9–50)
ERYTHROCYTE [DISTWIDTH] IN BLOOD BY AUTOMATED COUNT: 60.4 FL (ref 35.9–50)
FASTING STATUS PATIENT QL REPORTED: NORMAL
GFR SERPLBLD CREATININE-BSD FMLA CKD-EPI: 37 ML/MIN/1.73 M 2
GFR SERPLBLD CREATININE-BSD FMLA CKD-EPI: 38 ML/MIN/1.73 M 2
GLOBULIN SER CALC-MCNC: 2.6 G/DL (ref 1.9–3.5)
GLUCOSE SERPL-MCNC: 185 MG/DL (ref 65–99)
GLUCOSE SERPL-MCNC: 92 MG/DL (ref 65–99)
HCT VFR BLD AUTO: 32.4 % (ref 37–47)
HCT VFR BLD AUTO: 34.9 % (ref 37–47)
HGB BLD-MCNC: 10 G/DL (ref 12–16)
HGB BLD-MCNC: 10.8 G/DL (ref 12–16)
IMM GRANULOCYTES # BLD AUTO: 0.01 K/UL (ref 0–0.11)
IMM GRANULOCYTES # BLD AUTO: 0.01 K/UL (ref 0–0.11)
IMM GRANULOCYTES NFR BLD AUTO: 0.2 % (ref 0–0.9)
IMM GRANULOCYTES NFR BLD AUTO: 0.3 % (ref 0–0.9)
LYMPHOCYTES # BLD AUTO: 0.29 K/UL (ref 1–4.8)
LYMPHOCYTES # BLD AUTO: 0.6 K/UL (ref 1–4.8)
LYMPHOCYTES NFR BLD: 15.3 % (ref 22–41)
LYMPHOCYTES NFR BLD: 6.4 % (ref 22–41)
MCH RBC QN AUTO: 29.9 PG (ref 27–33)
MCH RBC QN AUTO: 29.9 PG (ref 27–33)
MCHC RBC AUTO-ENTMCNC: 30.9 G/DL (ref 33.6–35)
MCHC RBC AUTO-ENTMCNC: 30.9 G/DL (ref 33.6–35)
MCV RBC AUTO: 96.7 FL (ref 81.4–97.8)
MCV RBC AUTO: 97 FL (ref 81.4–97.8)
MONOCYTES # BLD AUTO: 0.15 K/UL (ref 0–0.85)
MONOCYTES # BLD AUTO: 0.18 K/UL (ref 0–0.85)
MONOCYTES NFR BLD AUTO: 3.3 % (ref 0–13.4)
MONOCYTES NFR BLD AUTO: 4.6 % (ref 0–13.4)
NEUTROPHILS # BLD AUTO: 3.11 K/UL (ref 2–7.15)
NEUTROPHILS # BLD AUTO: 4.08 K/UL (ref 2–7.15)
NEUTROPHILS NFR BLD: 79 % (ref 44–72)
NEUTROPHILS NFR BLD: 89.5 % (ref 44–72)
NRBC # BLD AUTO: 0 K/UL
NRBC # BLD AUTO: 0 K/UL
NRBC BLD-RTO: 0 /100 WBC
NRBC BLD-RTO: 0 /100 WBC
PHOSPHATE SERPL-MCNC: 1.8 MG/DL (ref 2.5–4.5)
PLATELET # BLD AUTO: 138 K/UL (ref 164–446)
PLATELET # BLD AUTO: 158 K/UL (ref 164–446)
PMV BLD AUTO: 10.5 FL (ref 9–12.9)
PMV BLD AUTO: 11.1 FL (ref 9–12.9)
POTASSIUM SERPL-SCNC: 5.6 MMOL/L (ref 3.6–5.5)
POTASSIUM SERPL-SCNC: 5.6 MMOL/L (ref 3.6–5.5)
PROT SERPL-MCNC: 7 G/DL (ref 6–8.2)
RBC # BLD AUTO: 3.34 M/UL (ref 4.2–5.4)
RBC # BLD AUTO: 3.61 M/UL (ref 4.2–5.4)
SODIUM SERPL-SCNC: 135 MMOL/L (ref 135–145)
SODIUM SERPL-SCNC: 137 MMOL/L (ref 135–145)
TROPONIN T SERPL-MCNC: 29 NG/L (ref 6–19)
TROPONIN T SERPL-MCNC: 30 NG/L (ref 6–19)
WBC # BLD AUTO: 3.9 K/UL (ref 4.8–10.8)
WBC # BLD AUTO: 4.6 K/UL (ref 4.8–10.8)

## 2022-12-12 PROCEDURE — 80197 ASSAY OF TACROLIMUS: CPT

## 2022-12-12 PROCEDURE — 71045 X-RAY EXAM CHEST 1 VIEW: CPT

## 2022-12-12 PROCEDURE — 80053 COMPREHEN METABOLIC PANEL: CPT

## 2022-12-12 PROCEDURE — 93005 ELECTROCARDIOGRAM TRACING: CPT | Performed by: EMERGENCY MEDICINE

## 2022-12-12 PROCEDURE — 80048 BASIC METABOLIC PNL TOTAL CA: CPT

## 2022-12-12 PROCEDURE — 36415 COLL VENOUS BLD VENIPUNCTURE: CPT

## 2022-12-12 PROCEDURE — 85025 COMPLETE CBC W/AUTO DIFF WBC: CPT | Mod: 91

## 2022-12-12 PROCEDURE — 84484 ASSAY OF TROPONIN QUANT: CPT

## 2022-12-12 PROCEDURE — 93005 ELECTROCARDIOGRAM TRACING: CPT

## 2022-12-12 PROCEDURE — 84100 ASSAY OF PHOSPHORUS: CPT

## 2022-12-12 PROCEDURE — 85025 COMPLETE CBC W/AUTO DIFF WBC: CPT

## 2022-12-12 PROCEDURE — 99285 EMERGENCY DEPT VISIT HI MDM: CPT

## 2022-12-12 RX ORDER — AMIODARONE HYDROCHLORIDE 200 MG/1
200 TABLET ORAL 2 TIMES DAILY
Qty: 14 TABLET | Refills: 0 | Status: ON HOLD | OUTPATIENT
Start: 2022-12-12 | End: 2022-12-20

## 2022-12-12 ASSESSMENT — FIBROSIS 4 INDEX: FIB4 SCORE: 2.4

## 2022-12-12 NOTE — ED TRIAGE NOTES
"Chief Complaint   Patient presents with    Chest Pain     Pt c/o L sided chest pain/pressure that travels up her L neck and down her L arm. Pt also endorses nausea since start. Has significant history of NSTEMI November 28th 2022 and afib RVR with cardioversion at same visit. Also has pertinent history of CKD with nephrectomy and av fistula, currently on [facility] renal transplant list. Pt is pale and ill appearing.       Wheelchair to triage for above complaint. Pt is poor historian.  Educated on triage process, encourage to inform staff of any changes.     BP (!) 98/72   Pulse (!) 124   Temp 35.8 °C (96.5 °F) (Temporal)   Resp 16   Ht 1.549 m (5' 1\")   Wt 55.4 kg (122 lb 2.2 oz)   LMP  (LMP Unknown)   SpO2 100%   BMI 23.08 kg/m²     "

## 2022-12-12 NOTE — ED PROVIDER NOTES
ED Provider Note    CHIEF COMPLAINT  Chief Complaint   Patient presents with    Chest Pain     Pt c/o L sided chest pain/pressure that travels up her L neck and down her L arm. Pt also endorses nausea since start. Has significant history of NSTEMI November 28th 2022 and afib RVR with cardioversion at same visit. Also has pertinent history of CKD with nephrectomy and av fistula, currently on [facility] renal transplant list. Pt is pale and ill appearing.       HPI  Tere Gallegos is a 72 y.o. female who presents with chief complaint of chest pain.  Chest pain comes and goes with associated rapid palpitations.  Patient reports that pain comes and goes randomly.  It has no association with exertion.  It can occur at any point and typically lasts for anywhere from 10 minutes to an hour.  Patient reports the last time she had similar symptoms she came to our emergency department and had to stay the night.  On EHR review patient presented with atrial fibrillation with RVR, she received diltiazem and return to sinus rhythm.  Patient was to follow-up with cardiology as an outpatient.  Patient had an echocardiogram at that point which revealed an ejection fraction of 60% and no overt dyskinesia.  Patient reports that symptoms are identical to what brought her to the emergency department during her last visit.  She denies any ongoing symptoms.  She denies any associated nausea or diaphoresis.    REVIEW OF SYSTEMS  ROS    See HPI for further details. All other systems are negative.     PAST MEDICAL HISTORY   has a past medical history of Acquired hypothyroidism (05/04/2020), CAD (coronary artery disease), Chronic diastolic heart failure (HCC) (05/04/2020), Coronary artery disease due to lipid rich plaque, Dental disorder, Diabetes ([facility]), ESRD (end stage renal disease) on dialysis ([facility]) (05/04/2020), Hemodialysis patient ([facility]), Hyperlipidemia, Hypertension, Kidney transplant candidate, Kidney transplant recipient  (10/31/2022), Presence of drug-eluting stent in right coronary artery, QT prolongation (01/22/2020), RLS (restless legs syndrome) (08/05/2016), and Transaminitis (12/22/2018).    SOCIAL HISTORY  Social History     Tobacco Use    Smoking status: Never    Smokeless tobacco: Never   Vaping Use    Vaping Use: Never used   Substance and Sexual Activity    Alcohol use: No     Alcohol/week: 0.0 oz    Drug use: No    Sexual activity: Never       SURGICAL HISTORY   has a past surgical history that includes recovery (08/16/2016); other abdominal surgery; other (Left, 2014); zzz cardiac cath (08/16/2016); zzz cardiac cath (09/07/2016); and other abdominal surgery (Right, 10/31/2022).    CURRENT MEDICATIONS  Home Medications    **Home medications have not yet been reviewed for this encounter**         ALLERGIES  No Known Allergies    PHYSICAL EXAM  Vitals:    12/12/22 1514   BP: 107/55   Pulse: (!) 53   Resp:    Temp:    SpO2: 97%       Physical Exam  Constitutional:       Appearance: She is well-developed.   HENT:      Head: Normocephalic and atraumatic.   Eyes:      Conjunctiva/sclera: Conjunctivae normal.   Cardiovascular:      Rate and Rhythm: Normal rate and regular rhythm.   Pulmonary:      Effort: Pulmonary effort is normal.      Breath sounds: Normal breath sounds.   Abdominal:      General: Bowel sounds are normal. There is no distension.      Palpations: Abdomen is soft.      Tenderness: There is no abdominal tenderness. There is no rebound.   Musculoskeletal:      Cervical back: Normal range of motion and neck supple.   Skin:     General: Skin is warm and dry.      Findings: No rash.   Neurological:      Mental Status: She is alert and oriented to person, place, and time.   Psychiatric:         Behavior: Behavior normal.         DIAGNOSTIC STUDIES / PROCEDURES    EKG  See below    LABS  Results for orders placed or performed during the hospital encounter of 12/12/22   CBC with Differential   Result Value Ref Range     WBC 4.6 (L) 4.8 - 10.8 K/uL    RBC 3.61 (L) 4.20 - 5.40 M/uL    Hemoglobin 10.8 (L) 12.0 - 16.0 g/dL    Hematocrit 34.9 (L) 37.0 - 47.0 %    MCV 96.7 81.4 - 97.8 fL    MCH 29.9 27.0 - 33.0 pg    MCHC 30.9 (L) 33.6 - 35.0 g/dL    RDW 60.1 (H) 35.9 - 50.0 fL    Platelet Count 158 (L) 164 - 446 K/uL    MPV 11.1 9.0 - 12.9 fL    Neutrophils-Polys 89.50 (H) 44.00 - 72.00 %    Lymphocytes 6.40 (L) 22.00 - 41.00 %    Monocytes 3.30 0.00 - 13.40 %    Eosinophils 0.20 0.00 - 6.90 %    Basophils 0.40 0.00 - 1.80 %    Immature Granulocytes 0.20 0.00 - 0.90 %    Nucleated RBC 0.00 /100 WBC    Neutrophils (Absolute) 4.08 2.00 - 7.15 K/uL    Lymphs (Absolute) 0.29 (L) 1.00 - 4.80 K/uL    Monos (Absolute) 0.15 0.00 - 0.85 K/uL    Eos (Absolute) 0.01 0.00 - 0.51 K/uL    Baso (Absolute) 0.02 0.00 - 0.12 K/uL    Immature Granulocytes (abs) 0.01 0.00 - 0.11 K/uL    NRBC (Absolute) 0.00 K/uL   Complete Metabolic Panel (CMP)   Result Value Ref Range    Sodium 129 (L) 135 - 145 mmol/L    Potassium 5.3 3.6 - 5.5 mmol/L    Chloride 100 96 - 112 mmol/L    Co2 17 (L) 20 - 33 mmol/L    Anion Gap 12.0 7.0 - 16.0    Glucose 185 (H) 65 - 99 mg/dL    Bun 26 (H) 8 - 22 mg/dL    Creatinine 1.48 (H) 0.50 - 1.40 mg/dL    Calcium 10.4 8.5 - 10.5 mg/dL    AST(SGOT) 15 12 - 45 U/L    ALT(SGPT) 8 2 - 50 U/L    Alkaline Phosphatase 138 (H) 30 - 99 U/L    Total Bilirubin 0.5 0.1 - 1.5 mg/dL    Albumin 4.4 3.2 - 4.9 g/dL    Total Protein 7.0 6.0 - 8.2 g/dL    Globulin 2.6 1.9 - 3.5 g/dL    A-G Ratio 1.7 g/dL   Troponins NOW   Result Value Ref Range    Troponin T 29 (H) 6 - 19 ng/L   ESTIMATED GFR   Result Value Ref Range    GFR (CKD-EPI) 37 (A) >60 mL/min/1.73 m 2   TROPONIN   Result Value Ref Range    Troponin T 30 (H) 6 - 19 ng/L   EKG (NOW)   Result Value Ref Range    Report       [facility] Emergency Dept.    Test Date:  2022-12-12  Pt Name:    DIOR NICHOLS    Department: ER  MRN:        [id number]                       Room:  Gender:     Female                       Technician: [id number]  :        [id number]                   Requested By:ER TRIAGE PROTOCOL  Order #:    [id number]                    Reading MD: Qian Greer MD    Measurements  Intervals                                Axis  Rate:       63                           P:          21  IN:         152                          QRS:        56  QRSD:       90                           T:          30  QT:         438  QTc:        449    Interpretive Statements  EKG is normal sinus rhythm normal axis normal intervals no ST changes  consistent  with acute regional ischemia  Electronically Signed On 2022 15:49:07 PST by Qian Greer MD           RADIOLOGY  DX-CHEST-PORTABLE (1 VIEW)   Final Result      1.  Postoperative changes in the left upper quadrant, numerous surgical clips consistent with left nephrectomy   2.  Borderline cardiomegaly, decreased from prior exam.   3.  Interval resolution of interstitial pulmonary edema.   4.  No acute infiltrates.              COURSE & MEDICAL DECISION MAKING  Pertinent Labs & Imaging studies reviewed. (See chart for details)    Patient here with symptoms that appear likely secondary to ongoing atrial fibrillation with RVR.  I believe the paroxysms her pain are likely related to this given the patient had identical symptoms when she presented last time and at that point was in RVR.  She is symptom-free at this point and is in sinus rhythm.  Patient otherwise appears very well.  Abdominal exam is entirely benign.  Patient denies any associated back pain or tearing quality of pain.  Patient symptoms are coming and going, this would be highly atypical of a fixed lesion such as a pulmonary embolism.  Patient EKG fails to reveal any evidence of overt acute regional ischemia.  Patient's basic labs were checked to evaluate for possible ACS, I believe that given patient was likely in atrial fibrillation with RVR, and given her advanced  age her troponin will likely return mildly positive but I do not believe this is necessarily indicative of critical cardiac occlusive ischemia.  Patient basic labs are largely unrevealing, troponin is mildly elevated.  Will trend.  I discussed the case with cardiology, they are comfortable with patient being discharged home and will follow up with her on her already scheduled appointment 2 weeks from now.  Per cardiology, given the suspicion that this is likely episodes of RVR that are leading to patient's pain will increase patient's dose of amiodarone.  I discussed this with patient and her son who are in understanding.  Return precautions discussed    The patient will return for worsening symptoms and is stable at the time of discharge. The patient verbalizes understanding and will comply.    FINAL IMPRESSION    1. Chest pain, unspecified type    2. Atrial fibrillation with RVR (HCC)            Electronically signed by: Percy Laughlin M.D., 12/12/2022 3:29 PM

## 2022-12-13 ENCOUNTER — PATIENT OUTREACH (OUTPATIENT)
Dept: HEALTH INFORMATION MANAGEMENT | Facility: OTHER | Age: 72
End: 2022-12-13
Payer: MEDICARE

## 2022-12-13 NOTE — PROGRESS NOTES
CHW Delmy received referral from PCP requesting appointment scheduling help. CHW spoke with pt's son Manoj. Manoj states that they were able to schedule appointments and do not need assistance at this time. CHW asked if there were any resources that pt might need, Manoj denied any needs. CHW provided CCM contact information and encouraged pt to call if anything was needed in the future. CHW will not continue to follow at this time due to needs being met.

## 2022-12-13 NOTE — DISCHARGE INSTRUCTIONS
Make sure you continue to take your Eliquis, and amiodarone.  I would like you to follow-up with your cardiologist for further care.  I have placed a referral for this, please call their office tomorrow and schedule an appointment.  Start taking the amiodarone, 1 tab in the morning and 1 tab in the evening.  Do this for 1 week.  After 1 week take the already on once in the morning, and a half tab in the evening

## 2022-12-14 ENCOUNTER — APPOINTMENT (OUTPATIENT)
Dept: MEDICAL GROUP | Facility: MEDICAL CENTER | Age: 72
End: 2022-12-14
Payer: MEDICARE

## 2022-12-14 LAB — TACROLIMUS BLD-MCNC: 23.7 NG/ML

## 2022-12-15 ENCOUNTER — HOSPITAL ENCOUNTER (OUTPATIENT)
Dept: LAB | Facility: MEDICAL CENTER | Age: 72
End: 2022-12-15
Attending: INTERNAL MEDICINE
Payer: MEDICARE

## 2022-12-15 LAB
ANION GAP SERPL CALC-SCNC: 9 MMOL/L (ref 7–16)
BASOPHILS # BLD AUTO: 0.3 % (ref 0–1.8)
BASOPHILS # BLD: 0.01 K/UL (ref 0–0.12)
BUN SERPL-MCNC: 30 MG/DL (ref 8–22)
CALCIUM SERPL-MCNC: 10 MG/DL (ref 8.4–10.2)
CHLORIDE SERPL-SCNC: 106 MMOL/L (ref 96–112)
CO2 SERPL-SCNC: 21 MMOL/L (ref 20–33)
CREAT SERPL-MCNC: 1.53 MG/DL (ref 0.5–1.4)
EOSINOPHIL # BLD AUTO: 0.02 K/UL (ref 0–0.51)
EOSINOPHIL NFR BLD: 0.5 % (ref 0–6.9)
ERYTHROCYTE [DISTWIDTH] IN BLOOD BY AUTOMATED COUNT: 58.5 FL (ref 35.9–50)
GFR SERPLBLD CREATININE-BSD FMLA CKD-EPI: 36 ML/MIN/1.73 M 2
GLUCOSE SERPL-MCNC: 85 MG/DL (ref 65–99)
HCT VFR BLD AUTO: 31 % (ref 37–47)
HGB BLD-MCNC: 9.6 G/DL (ref 12–16)
IMM GRANULOCYTES # BLD AUTO: 0.01 K/UL (ref 0–0.11)
IMM GRANULOCYTES NFR BLD AUTO: 0.3 % (ref 0–0.9)
LYMPHOCYTES # BLD AUTO: 0.59 K/UL (ref 1–4.8)
LYMPHOCYTES NFR BLD: 16.2 % (ref 22–41)
MCH RBC QN AUTO: 30.1 PG (ref 27–33)
MCHC RBC AUTO-ENTMCNC: 31 G/DL (ref 33.6–35)
MCV RBC AUTO: 97.2 FL (ref 81.4–97.8)
MONOCYTES # BLD AUTO: 0.13 K/UL (ref 0–0.85)
MONOCYTES NFR BLD AUTO: 3.6 % (ref 0–13.4)
NEUTROPHILS # BLD AUTO: 2.89 K/UL (ref 2–7.15)
NEUTROPHILS NFR BLD: 79.1 % (ref 44–72)
NRBC # BLD AUTO: 0 K/UL
NRBC BLD-RTO: 0 /100 WBC
PHOSPHATE SERPL-MCNC: 2 MG/DL (ref 2.5–4.5)
PLATELET # BLD AUTO: 142 K/UL (ref 164–446)
PMV BLD AUTO: 11.3 FL (ref 9–12.9)
POTASSIUM SERPL-SCNC: 5.6 MMOL/L (ref 3.6–5.5)
RBC # BLD AUTO: 3.19 M/UL (ref 4.2–5.4)
SODIUM SERPL-SCNC: 136 MMOL/L (ref 135–145)
WBC # BLD AUTO: 3.7 K/UL (ref 4.8–10.8)

## 2022-12-15 PROCEDURE — 80048 BASIC METABOLIC PNL TOTAL CA: CPT

## 2022-12-15 PROCEDURE — 85025 COMPLETE CBC W/AUTO DIFF WBC: CPT

## 2022-12-15 PROCEDURE — 36415 COLL VENOUS BLD VENIPUNCTURE: CPT

## 2022-12-15 PROCEDURE — 84100 ASSAY OF PHOSPHORUS: CPT

## 2022-12-15 PROCEDURE — 80197 ASSAY OF TACROLIMUS: CPT

## 2022-12-17 LAB — TACROLIMUS BLD-MCNC: 13.8 NG/ML

## 2022-12-19 ENCOUNTER — APPOINTMENT (OUTPATIENT)
Dept: RADIOLOGY | Facility: MEDICAL CENTER | Age: 72
End: 2022-12-19
Attending: EMERGENCY MEDICINE
Payer: MEDICARE

## 2022-12-19 ENCOUNTER — HOSPITAL ENCOUNTER (OUTPATIENT)
Facility: MEDICAL CENTER | Age: 72
End: 2022-12-20
Attending: EMERGENCY MEDICINE | Admitting: INTERNAL MEDICINE
Payer: MEDICARE

## 2022-12-19 DIAGNOSIS — E83.42 HYPOMAGNESEMIA: ICD-10-CM

## 2022-12-19 DIAGNOSIS — Z94.0 KIDNEY TRANSPLANT RECIPIENT: ICD-10-CM

## 2022-12-19 DIAGNOSIS — Z86.79 HISTORY OF CORONARY ARTERY DISEASE: ICD-10-CM

## 2022-12-19 DIAGNOSIS — I50.9 CONGESTIVE HEART FAILURE, UNSPECIFIED HF CHRONICITY, UNSPECIFIED HEART FAILURE TYPE (HCC): ICD-10-CM

## 2022-12-19 DIAGNOSIS — R79.89 ELEVATED TROPONIN: ICD-10-CM

## 2022-12-19 DIAGNOSIS — Z95.5 HISTORY OF HEART ARTERY STENT: ICD-10-CM

## 2022-12-19 DIAGNOSIS — R07.9 ACUTE CHEST PAIN: ICD-10-CM

## 2022-12-19 DIAGNOSIS — R79.89 ELEVATED BRAIN NATRIURETIC PEPTIDE (BNP) LEVEL: ICD-10-CM

## 2022-12-19 DIAGNOSIS — I48.91 ATRIAL FIBRILLATION WITH RVR (HCC): ICD-10-CM

## 2022-12-19 PROBLEM — E11.9 TYPE 2 DIABETES MELLITUS (HCC): Status: ACTIVE | Noted: 2022-12-19

## 2022-12-19 PROBLEM — I10 ESSENTIAL HYPERTENSION: Status: ACTIVE | Noted: 2022-12-19

## 2022-12-19 PROBLEM — I48.20 CHRONIC ATRIAL FIBRILLATION (HCC): Status: RESOLVED | Noted: 2022-12-19 | Resolved: 2022-12-19

## 2022-12-19 PROBLEM — R07.89 ATYPICAL CHEST PAIN: Status: ACTIVE | Noted: 2022-12-19

## 2022-12-19 PROBLEM — I48.20 CHRONIC ATRIAL FIBRILLATION (HCC): Status: ACTIVE | Noted: 2022-12-19

## 2022-12-19 LAB
ALBUMIN SERPL BCP-MCNC: 4.4 G/DL (ref 3.2–4.9)
ALBUMIN/GLOB SERPL: 1.9 G/DL
ALP SERPL-CCNC: 121 U/L (ref 30–99)
ALT SERPL-CCNC: 9 U/L (ref 2–50)
AMPHET UR QL SCN: NEGATIVE
ANION GAP SERPL CALC-SCNC: 11 MMOL/L (ref 7–16)
APTT PPP: 30.2 SEC (ref 24.7–36)
AST SERPL-CCNC: 15 U/L (ref 12–45)
BARBITURATES UR QL SCN: NEGATIVE
BASOPHILS # BLD AUTO: 0.3 % (ref 0–1.8)
BASOPHILS # BLD: 0.01 K/UL (ref 0–0.12)
BENZODIAZ UR QL SCN: NEGATIVE
BILIRUB SERPL-MCNC: 0.2 MG/DL (ref 0.1–1.5)
BUN SERPL-MCNC: 22 MG/DL (ref 8–22)
BZE UR QL SCN: NEGATIVE
CALCIUM ALBUM COR SERPL-MCNC: 10.1 MG/DL (ref 8.5–10.5)
CALCIUM SERPL-MCNC: 10.4 MG/DL (ref 8.4–10.2)
CANNABINOIDS UR QL SCN: NEGATIVE
CHLORIDE SERPL-SCNC: 108 MMOL/L (ref 96–112)
CHOLEST SERPL-MCNC: 117 MG/DL (ref 100–199)
CO2 SERPL-SCNC: 21 MMOL/L (ref 20–33)
CREAT SERPL-MCNC: 1.07 MG/DL (ref 0.5–1.4)
D DIMER PPP IA.FEU-MCNC: 0.78 UG/ML (FEU) (ref 0–0.5)
EKG IMPRESSION: NORMAL
EKG IMPRESSION: NORMAL
EOSINOPHIL # BLD AUTO: 0.02 K/UL (ref 0–0.51)
EOSINOPHIL NFR BLD: 0.5 % (ref 0–6.9)
ERYTHROCYTE [DISTWIDTH] IN BLOOD BY AUTOMATED COUNT: 58.1 FL (ref 35.9–50)
EST. AVERAGE GLUCOSE BLD GHB EST-MCNC: 120 MG/DL
FLUAV RNA SPEC QL NAA+PROBE: NEGATIVE
FLUBV RNA SPEC QL NAA+PROBE: NEGATIVE
GFR SERPLBLD CREATININE-BSD FMLA CKD-EPI: 55 ML/MIN/1.73 M 2
GLOBULIN SER CALC-MCNC: 2.3 G/DL (ref 1.9–3.5)
GLUCOSE BLD STRIP.AUTO-MCNC: 103 MG/DL (ref 65–99)
GLUCOSE BLD STRIP.AUTO-MCNC: 207 MG/DL (ref 65–99)
GLUCOSE BLD STRIP.AUTO-MCNC: 218 MG/DL (ref 65–99)
GLUCOSE SERPL-MCNC: 115 MG/DL (ref 65–99)
HBA1C MFR BLD: 5.8 % (ref 4–5.6)
HCT VFR BLD AUTO: 34 % (ref 37–47)
HDLC SERPL-MCNC: 39 MG/DL
HGB BLD-MCNC: 10.7 G/DL (ref 12–16)
IMM GRANULOCYTES # BLD AUTO: 0.02 K/UL (ref 0–0.11)
IMM GRANULOCYTES NFR BLD AUTO: 0.5 % (ref 0–0.9)
INR PPP: 1.1 (ref 0.87–1.13)
LDLC SERPL CALC-MCNC: 43 MG/DL
LYMPHOCYTES # BLD AUTO: 0.44 K/UL (ref 1–4.8)
LYMPHOCYTES NFR BLD: 11.5 % (ref 22–41)
MAGNESIUM SERPL-MCNC: 1.2 MG/DL (ref 1.5–2.5)
MCH RBC QN AUTO: 30.1 PG (ref 27–33)
MCHC RBC AUTO-ENTMCNC: 31.5 G/DL (ref 33.6–35)
MCV RBC AUTO: 95.8 FL (ref 81.4–97.8)
METHADONE UR QL SCN: NEGATIVE
MONOCYTES # BLD AUTO: 0.13 K/UL (ref 0–0.85)
MONOCYTES NFR BLD AUTO: 3.4 % (ref 0–13.4)
NEUTROPHILS # BLD AUTO: 3.2 K/UL (ref 2–7.15)
NEUTROPHILS NFR BLD: 83.8 % (ref 44–72)
NRBC # BLD AUTO: 0 K/UL
NRBC BLD-RTO: 0 /100 WBC
NT-PROBNP SERPL IA-MCNC: 1926 PG/ML (ref 0–125)
OPIATES UR QL SCN: NEGATIVE
OXYCODONE UR QL SCN: NEGATIVE
PCP UR QL SCN: NEGATIVE
PLATELET # BLD AUTO: 157 K/UL (ref 164–446)
PMV BLD AUTO: 11.2 FL (ref 9–12.9)
POTASSIUM SERPL-SCNC: 4.4 MMOL/L (ref 3.6–5.5)
PROPOXYPH UR QL SCN: NEGATIVE
PROT SERPL-MCNC: 6.7 G/DL (ref 6–8.2)
PROTHROMBIN TIME: 14.1 SEC (ref 12–14.6)
RBC # BLD AUTO: 3.55 M/UL (ref 4.2–5.4)
RSV RNA SPEC QL NAA+PROBE: NEGATIVE
SARS-COV-2 RNA RESP QL NAA+PROBE: NOTDETECTED
SODIUM SERPL-SCNC: 140 MMOL/L (ref 135–145)
SPECIMEN SOURCE: NORMAL
TRIGL SERPL-MCNC: 177 MG/DL (ref 0–149)
TROPONIN T SERPL-MCNC: 24 NG/L (ref 6–19)
TROPONIN T SERPL-MCNC: 25 NG/L (ref 6–19)
TROPONIN T SERPL-MCNC: 28 NG/L (ref 6–19)
WBC # BLD AUTO: 3.8 K/UL (ref 4.8–10.8)

## 2022-12-19 PROCEDURE — 96372 THER/PROPH/DIAG INJ SC/IM: CPT | Mod: XU

## 2022-12-19 PROCEDURE — C9803 HOPD COVID-19 SPEC COLLECT: HCPCS | Performed by: INTERNAL MEDICINE

## 2022-12-19 PROCEDURE — 96366 THER/PROPH/DIAG IV INF ADDON: CPT

## 2022-12-19 PROCEDURE — 93010 ELECTROCARDIOGRAM REPORT: CPT | Performed by: INTERNAL MEDICINE

## 2022-12-19 PROCEDURE — 36415 COLL VENOUS BLD VENIPUNCTURE: CPT

## 2022-12-19 PROCEDURE — 80307 DRUG TEST PRSMV CHEM ANLYZR: CPT

## 2022-12-19 PROCEDURE — 99285 EMERGENCY DEPT VISIT HI MDM: CPT

## 2022-12-19 PROCEDURE — 83036 HEMOGLOBIN GLYCOSYLATED A1C: CPT

## 2022-12-19 PROCEDURE — 93005 ELECTROCARDIOGRAM TRACING: CPT

## 2022-12-19 PROCEDURE — 80061 LIPID PANEL: CPT

## 2022-12-19 PROCEDURE — 85610 PROTHROMBIN TIME: CPT

## 2022-12-19 PROCEDURE — G0378 HOSPITAL OBSERVATION PER HR: HCPCS

## 2022-12-19 PROCEDURE — 94760 N-INVAS EAR/PLS OXIMETRY 1: CPT

## 2022-12-19 PROCEDURE — 96367 TX/PROPH/DG ADDL SEQ IV INF: CPT

## 2022-12-19 PROCEDURE — A9270 NON-COVERED ITEM OR SERVICE: HCPCS | Performed by: INTERNAL MEDICINE

## 2022-12-19 PROCEDURE — 0241U HCHG SARS-COV-2 COVID-19 NFCT DS RESP RNA 4 TRGT MIC: CPT

## 2022-12-19 PROCEDURE — 96365 THER/PROPH/DIAG IV INF INIT: CPT

## 2022-12-19 PROCEDURE — 700111 HCHG RX REV CODE 636 W/ 250 OVERRIDE (IP): Performed by: INTERNAL MEDICINE

## 2022-12-19 PROCEDURE — 82962 GLUCOSE BLOOD TEST: CPT | Mod: 91

## 2022-12-19 PROCEDURE — 85379 FIBRIN DEGRADATION QUANT: CPT

## 2022-12-19 PROCEDURE — 93005 ELECTROCARDIOGRAM TRACING: CPT | Performed by: INTERNAL MEDICINE

## 2022-12-19 PROCEDURE — 85730 THROMBOPLASTIN TIME PARTIAL: CPT

## 2022-12-19 PROCEDURE — 80053 COMPREHEN METABOLIC PANEL: CPT

## 2022-12-19 PROCEDURE — 93005 ELECTROCARDIOGRAM TRACING: CPT | Performed by: EMERGENCY MEDICINE

## 2022-12-19 PROCEDURE — 71045 X-RAY EXAM CHEST 1 VIEW: CPT

## 2022-12-19 PROCEDURE — 83735 ASSAY OF MAGNESIUM: CPT

## 2022-12-19 PROCEDURE — 83880 ASSAY OF NATRIURETIC PEPTIDE: CPT

## 2022-12-19 PROCEDURE — 84484 ASSAY OF TROPONIN QUANT: CPT

## 2022-12-19 PROCEDURE — 99220 PR INITIAL OBSERVATION CARE,LEVL III: CPT | Performed by: INTERNAL MEDICINE

## 2022-12-19 PROCEDURE — 85025 COMPLETE CBC W/AUTO DIFF WBC: CPT

## 2022-12-19 PROCEDURE — 700102 HCHG RX REV CODE 250 W/ 637 OVERRIDE(OP): Performed by: INTERNAL MEDICINE

## 2022-12-19 RX ORDER — LABETALOL HYDROCHLORIDE 5 MG/ML
10 INJECTION, SOLUTION INTRAVENOUS EVERY 4 HOURS PRN
Status: DISCONTINUED | OUTPATIENT
Start: 2022-12-19 | End: 2022-12-20 | Stop reason: HOSPADM

## 2022-12-19 RX ORDER — DOCUSATE SODIUM 100 MG/1
100-200 CAPSULE, LIQUID FILLED ORAL 2 TIMES DAILY PRN
COMMUNITY
End: 2023-03-08 | Stop reason: SDUPTHER

## 2022-12-19 RX ORDER — MAGNESIUM SULFATE HEPTAHYDRATE 40 MG/ML
2 INJECTION, SOLUTION INTRAVENOUS ONCE
Status: COMPLETED | OUTPATIENT
Start: 2022-12-19 | End: 2022-12-19

## 2022-12-19 RX ORDER — ACETAMINOPHEN 325 MG/1
650 TABLET ORAL EVERY 6 HOURS PRN
Status: DISCONTINUED | OUTPATIENT
Start: 2022-12-19 | End: 2022-12-20 | Stop reason: HOSPADM

## 2022-12-19 RX ORDER — PREDNISONE 10 MG/1
10 TABLET ORAL DAILY
Status: DISCONTINUED | OUTPATIENT
Start: 2022-12-19 | End: 2022-12-20 | Stop reason: HOSPADM

## 2022-12-19 RX ORDER — HYDROCODONE BITARTRATE AND ACETAMINOPHEN 5; 325 MG/1; MG/1
1 TABLET ORAL EVERY 6 HOURS PRN
Status: DISCONTINUED | OUTPATIENT
Start: 2022-12-19 | End: 2022-12-20 | Stop reason: HOSPADM

## 2022-12-19 RX ORDER — POLYETHYLENE GLYCOL 3350 17 G/17G
1 POWDER, FOR SOLUTION ORAL
Status: DISCONTINUED | OUTPATIENT
Start: 2022-12-19 | End: 2022-12-20 | Stop reason: HOSPADM

## 2022-12-19 RX ORDER — ONDANSETRON 2 MG/ML
4 INJECTION INTRAMUSCULAR; INTRAVENOUS EVERY 4 HOURS PRN
Status: DISCONTINUED | OUTPATIENT
Start: 2022-12-19 | End: 2022-12-20 | Stop reason: HOSPADM

## 2022-12-19 RX ORDER — OMEPRAZOLE 20 MG/1
20 CAPSULE, DELAYED RELEASE ORAL DAILY
Status: DISCONTINUED | OUTPATIENT
Start: 2022-12-19 | End: 2022-12-20 | Stop reason: HOSPADM

## 2022-12-19 RX ORDER — TACROLIMUS 1 MG/1
1 CAPSULE ORAL 2 TIMES DAILY
Status: DISCONTINUED | OUTPATIENT
Start: 2022-12-19 | End: 2022-12-20 | Stop reason: HOSPADM

## 2022-12-19 RX ORDER — CARVEDILOL 6.25 MG/1
6.25 TABLET ORAL 2 TIMES DAILY
Status: DISCONTINUED | OUTPATIENT
Start: 2022-12-19 | End: 2022-12-20 | Stop reason: HOSPADM

## 2022-12-19 RX ORDER — FLUTICASONE PROPIONATE 50 MCG
1 SPRAY, SUSPENSION (ML) NASAL DAILY
Status: DISCONTINUED | OUTPATIENT
Start: 2022-12-19 | End: 2022-12-20 | Stop reason: HOSPADM

## 2022-12-19 RX ORDER — AMOXICILLIN 250 MG
2 CAPSULE ORAL 2 TIMES DAILY
Status: DISCONTINUED | OUTPATIENT
Start: 2022-12-19 | End: 2022-12-20 | Stop reason: HOSPADM

## 2022-12-19 RX ORDER — AMINOPHYLLINE 25 MG/ML
100 INJECTION, SOLUTION INTRAVENOUS
Status: DISCONTINUED | OUTPATIENT
Start: 2022-12-19 | End: 2022-12-20 | Stop reason: HOSPADM

## 2022-12-19 RX ORDER — PANTOPRAZOLE SODIUM 40 MG/1
40 TABLET, DELAYED RELEASE ORAL DAILY
Status: DISCONTINUED | OUTPATIENT
Start: 2022-12-19 | End: 2022-12-19

## 2022-12-19 RX ORDER — CLOTRIMAZOLE 10 MG/1
10 LOZENGE ORAL; TOPICAL 4 TIMES DAILY
Status: DISCONTINUED | OUTPATIENT
Start: 2022-12-19 | End: 2022-12-20 | Stop reason: HOSPADM

## 2022-12-19 RX ORDER — BISACODYL 10 MG
10 SUPPOSITORY, RECTAL RECTAL
Status: DISCONTINUED | OUTPATIENT
Start: 2022-12-19 | End: 2022-12-20 | Stop reason: HOSPADM

## 2022-12-19 RX ORDER — LOSARTAN POTASSIUM 25 MG/1
25 TABLET ORAL DAILY
Status: DISCONTINUED | OUTPATIENT
Start: 2022-12-19 | End: 2022-12-20 | Stop reason: HOSPADM

## 2022-12-19 RX ORDER — SULFAMETHOXAZOLE AND TRIMETHOPRIM 800; 160 MG/1; MG/1
0.5 TABLET ORAL DAILY
Status: DISCONTINUED | OUTPATIENT
Start: 2022-12-19 | End: 2022-12-20 | Stop reason: HOSPADM

## 2022-12-19 RX ORDER — LEVOTHYROXINE SODIUM 0.05 MG/1
50 TABLET ORAL
Status: DISCONTINUED | OUTPATIENT
Start: 2022-12-19 | End: 2022-12-20 | Stop reason: HOSPADM

## 2022-12-19 RX ORDER — AMIODARONE HYDROCHLORIDE 200 MG/1
200 TABLET ORAL
Status: DISCONTINUED | OUTPATIENT
Start: 2022-12-19 | End: 2022-12-20 | Stop reason: HOSPADM

## 2022-12-19 RX ORDER — ATORVASTATIN CALCIUM 20 MG/1
20 TABLET, FILM COATED ORAL NIGHTLY
Status: DISCONTINUED | OUTPATIENT
Start: 2022-12-19 | End: 2022-12-20 | Stop reason: HOSPADM

## 2022-12-19 RX ORDER — FUROSEMIDE 40 MG/1
40 TABLET ORAL DAILY
Status: DISCONTINUED | OUTPATIENT
Start: 2022-12-19 | End: 2022-12-20 | Stop reason: HOSPADM

## 2022-12-19 RX ORDER — VALGANCICLOVIR 450 MG/1
450 TABLET, FILM COATED ORAL DAILY
Status: DISCONTINUED | OUTPATIENT
Start: 2022-12-19 | End: 2022-12-20 | Stop reason: HOSPADM

## 2022-12-19 RX ORDER — MYCOPHENOLATE MOFETIL 250 MG/1
1000 CAPSULE ORAL 2 TIMES DAILY
Status: DISCONTINUED | OUTPATIENT
Start: 2022-12-19 | End: 2022-12-20 | Stop reason: HOSPADM

## 2022-12-19 RX ORDER — NITROGLYCERIN 0.4 MG/1
0.4 TABLET SUBLINGUAL
Status: DISCONTINUED | OUTPATIENT
Start: 2022-12-19 | End: 2022-12-20 | Stop reason: HOSPADM

## 2022-12-19 RX ORDER — SODIUM BICARBONATE 650 MG/1
1300 TABLET ORAL 3 TIMES DAILY
Status: DISCONTINUED | OUTPATIENT
Start: 2022-12-19 | End: 2022-12-20 | Stop reason: HOSPADM

## 2022-12-19 RX ORDER — ALUMINA, MAGNESIA, AND SIMETHICONE 2400; 2400; 240 MG/30ML; MG/30ML; MG/30ML
30 SUSPENSION ORAL EVERY 4 HOURS PRN
Status: DISCONTINUED | OUTPATIENT
Start: 2022-12-19 | End: 2022-12-20 | Stop reason: HOSPADM

## 2022-12-19 RX ORDER — MORPHINE SULFATE 4 MG/ML
1-2 INJECTION INTRAVENOUS
Status: DISCONTINUED | OUTPATIENT
Start: 2022-12-19 | End: 2022-12-20 | Stop reason: HOSPADM

## 2022-12-19 RX ORDER — ONDANSETRON 4 MG/1
4 TABLET, ORALLY DISINTEGRATING ORAL EVERY 4 HOURS PRN
Status: DISCONTINUED | OUTPATIENT
Start: 2022-12-19 | End: 2022-12-20 | Stop reason: HOSPADM

## 2022-12-19 RX ORDER — REGADENOSON 0.08 MG/ML
0.4 INJECTION, SOLUTION INTRAVENOUS
Status: COMPLETED | OUTPATIENT
Start: 2022-12-19 | End: 2022-12-20

## 2022-12-19 RX ADMIN — INSULIN HUMAN 2 UNITS: 100 INJECTION, SOLUTION PARENTERAL at 17:31

## 2022-12-19 RX ADMIN — APIXABAN 2.5 MG: 2.5 TABLET, FILM COATED ORAL at 17:28

## 2022-12-19 RX ADMIN — CLOTRIMAZOLE 10 MG: 10 LOZENGE ORAL; TOPICAL at 17:28

## 2022-12-19 RX ADMIN — LOSARTAN POTASSIUM 25 MG: 25 TABLET, FILM COATED ORAL at 11:35

## 2022-12-19 RX ADMIN — SODIUM BICARBONATE 650 MG TABLET 1300 MG: at 20:52

## 2022-12-19 RX ADMIN — VALGANCICLOVIR 450 MG: 450 TABLET, FILM COATED ORAL at 11:36

## 2022-12-19 RX ADMIN — APIXABAN 2.5 MG: 2.5 TABLET, FILM COATED ORAL at 11:35

## 2022-12-19 RX ADMIN — SODIUM BICARBONATE 650 MG TABLET 1300 MG: at 11:36

## 2022-12-19 RX ADMIN — LEVOTHYROXINE SODIUM 50 MCG: 0.05 TABLET ORAL at 11:35

## 2022-12-19 RX ADMIN — INSULIN GLARGINE-YFGN 8 UNITS: 100 INJECTION, SOLUTION SUBCUTANEOUS at 11:48

## 2022-12-19 RX ADMIN — PREDNISONE 10 MG: 10 TABLET ORAL at 11:35

## 2022-12-19 RX ADMIN — ASPIRIN 81 MG: 81 TABLET, COATED ORAL at 11:52

## 2022-12-19 RX ADMIN — AMIODARONE HYDROCHLORIDE 200 MG: 200 TABLET ORAL at 11:52

## 2022-12-19 RX ADMIN — ATORVASTATIN CALCIUM 20 MG: 20 TABLET, FILM COATED ORAL at 20:52

## 2022-12-19 RX ADMIN — MYCOPHENOLATE MOFETIL 1000 MG: 250 CAPSULE ORAL at 17:29

## 2022-12-19 RX ADMIN — MAGNESIUM SULFATE 2 G: 2 INJECTION INTRAVENOUS at 11:44

## 2022-12-19 RX ADMIN — CARVEDILOL 6.25 MG: 6.25 TABLET, FILM COATED ORAL at 17:28

## 2022-12-19 RX ADMIN — CARVEDILOL 6.25 MG: 6.25 TABLET, FILM COATED ORAL at 11:44

## 2022-12-19 RX ADMIN — SULFAMETHOXAZOLE AND TRIMETHOPRIM 0.5 TABLET: 800; 160 TABLET ORAL at 11:35

## 2022-12-19 RX ADMIN — SODIUM BICARBONATE 650 MG TABLET 1300 MG: at 15:49

## 2022-12-19 RX ADMIN — INSULIN HUMAN 2 UNITS: 100 INJECTION, SOLUTION PARENTERAL at 21:10

## 2022-12-19 RX ADMIN — CLOTRIMAZOLE 10 MG: 10 LOZENGE ORAL; TOPICAL at 20:52

## 2022-12-19 RX ADMIN — TACROLIMUS 1 MG: 1 CAPSULE ORAL at 11:35

## 2022-12-19 RX ADMIN — SENNOSIDES AND DOCUSATE SODIUM 2 TABLET: 50; 8.6 TABLET ORAL at 17:28

## 2022-12-19 RX ADMIN — TACROLIMUS 1 MG: 1 CAPSULE ORAL at 17:28

## 2022-12-19 RX ADMIN — OMEPRAZOLE 20 MG: 20 CAPSULE, DELAYED RELEASE ORAL at 11:35

## 2022-12-19 RX ADMIN — MYCOPHENOLATE MOFETIL 1000 MG: 250 CAPSULE ORAL at 11:33

## 2022-12-19 RX ADMIN — FUROSEMIDE 40 MG: 40 TABLET ORAL at 11:35

## 2022-12-19 RX ADMIN — CLOTRIMAZOLE 10 MG: 10 LOZENGE ORAL; TOPICAL at 11:36

## 2022-12-19 ASSESSMENT — CHA2DS2 SCORE
CHF OR LEFT VENTRICULAR DYSFUNCTION: YES
HYPERTENSION: YES
CHA2DS2 VASC SCORE: 6
AGE 75 OR GREATER: NO
PRIOR STROKE OR TIA OR THROMBOEMBOLISM: NO
AGE 65 TO 74: YES
DIABETES: YES
VASCULAR DISEASE: YES
SEX: FEMALE

## 2022-12-19 ASSESSMENT — LIFESTYLE VARIABLES
HAVE YOU EVER FELT YOU SHOULD CUT DOWN ON YOUR DRINKING: NO
ON A TYPICAL DAY WHEN YOU DRINK ALCOHOL HOW MANY DRINKS DO YOU HAVE: 0
HOW MANY TIMES IN THE PAST YEAR HAVE YOU HAD 5 OR MORE DRINKS IN A DAY: 0
EVER HAD A DRINK FIRST THING IN THE MORNING TO STEADY YOUR NERVES TO GET RID OF A HANGOVER: NO
EVER FELT BAD OR GUILTY ABOUT YOUR DRINKING: NO
HAVE PEOPLE ANNOYED YOU BY CRITICIZING YOUR DRINKING: NO
ALCOHOL_USE: NO
TOTAL SCORE: 0
TOTAL SCORE: 0
CONSUMPTION TOTAL: NEGATIVE
AVERAGE NUMBER OF DAYS PER WEEK YOU HAVE A DRINK CONTAINING ALCOHOL: 0
TOTAL SCORE: 0

## 2022-12-19 ASSESSMENT — COGNITIVE AND FUNCTIONAL STATUS - GENERAL
WALKING IN HOSPITAL ROOM: A LITTLE
MOVING TO AND FROM BED TO CHAIR: A LITTLE
DRESSING REGULAR UPPER BODY CLOTHING: A LITTLE
PERSONAL GROOMING: A LITTLE
TOILETING: A LITTLE
DAILY ACTIVITIY SCORE: 19
SUGGESTED CMS G CODE MODIFIER MOBILITY: CK
STANDING UP FROM CHAIR USING ARMS: A LITTLE
SUGGESTED CMS G CODE MODIFIER DAILY ACTIVITY: CK
MOVING FROM LYING ON BACK TO SITTING ON SIDE OF FLAT BED: A LITTLE
CLIMB 3 TO 5 STEPS WITH RAILING: A LITTLE
MOBILITY SCORE: 18
HELP NEEDED FOR BATHING: A LITTLE
DRESSING REGULAR LOWER BODY CLOTHING: A LITTLE
TOILETING: A LITTLE
TURNING FROM BACK TO SIDE WHILE IN FLAT BAD: A LITTLE
DRESSING REGULAR UPPER BODY CLOTHING: A LITTLE
HELP NEEDED FOR BATHING: A LITTLE
PERSONAL GROOMING: A LITTLE

## 2022-12-19 ASSESSMENT — FIBROSIS 4 INDEX
FIB4 SCORE: 2.32
FIB4 SCORE: 2.32
FIB4 SCORE: 2.73

## 2022-12-19 NOTE — ASSESSMENT & PLAN NOTE
- Resume home Coreg, Lasix, and losartan.  Monitor blood pressure trend closely with as needed IV labetalol for significant hypertension.

## 2022-12-19 NOTE — ED NOTES
Dr Campa responded and would like daughter to take meds home.  RN removed meds from room and will have daughter take home once she returns.

## 2022-12-19 NOTE — ED NOTES
Call light on, pt requesting to go BR, RN saw 2 pill bottles in her lap, Amiodarone 200mg and Lasix 40 mg, RN asked if patient took these meds and she said yes, RN just medicated patient with scheduled meds including  these 2.  RN removed her bag of pills to nurses station and gave med tech her med list.  Will update Dr Campa via [employer].

## 2022-12-19 NOTE — ASSESSMENT & PLAN NOTE
- Resume transplant medications including tacrolimus, CellCept, and prednisone.  We will also resume her prophylactic medications including Bactrim, clotrimazole, and valganciclovir.

## 2022-12-19 NOTE — ED PROVIDER NOTES
"ED Provider Note    Scribed for Wilfredo Haywood M.D. by Zuleyka Dallas. 12/19/2022  8:04 AM    Primary care provider: ANGELITA Concepcion  Means of arrival: Walk In  History obtained from: Patient  History limited by: Poor Historian    CHIEF COMPLAINT  Chief Complaint   Patient presents with    Chest Pain     Started this am after waking up, described as \"feeling unwell\", states pain radiates to Left Arm, denies N/V       HPI  Tere Gallegos is a 72 y.o. female who presents to the Emergency Department for left chest pain onset approximately two hours ago when she woke up. The pain radiates to her left arm, and has improved since onset, however she still feels unwell. She has associated nausea, but denies vomiting or shortness of breath. She notes having similar episodes of pain before, the last one being 8 days ago, however it resolved after an episode of emesis. She denies a cardiac history, other than having episodes of chest pain in the past.    Further information is limited secondary to patient being a poor historian.     REVIEW OF SYSTEMS  Pertinent positives include chest pain radiating into left arm, and nausea.   Pertinent negatives include no shortness of breath or vomiting.    Further information is limited secondary to patient being a poor historian.   See HPI for further details.       PAST MEDICAL HISTORY   has a past medical history of Acquired hypothyroidism (05/04/2020), CAD (coronary artery disease), Chronic diastolic heart failure (HCC) (05/04/2020), Coronary artery disease due to lipid rich plaque, Dental disorder, Diabetes ([facility]), ESRD (end stage renal disease) on dialysis ([facility]) (05/04/2020), Hemodialysis patient ([facility]), Hyperlipidemia, Hypertension, Kidney transplant candidate, Kidney transplant recipient (10/31/2022), Presence of drug-eluting stent in right coronary artery, QT prolongation (01/22/2020), RLS (restless legs syndrome) (08/05/2016), and Transaminitis " (12/22/2018).    SURGICAL HISTORY   has a past surgical history that includes recovery (08/16/2016); other abdominal surgery; other (Left, 2014); zzz cardiac cath (08/16/2016); zzz cardiac cath (09/07/2016); and other abdominal surgery (Right, 10/31/2022).    SOCIAL HISTORY  Social History     Tobacco Use    Smoking status: Never    Smokeless tobacco: Never   Vaping Use    Vaping Use: Never used   Substance Use Topics    Alcohol use: No     Alcohol/week: 0.0 oz    Drug use: No      Social History     Substance and Sexual Activity   Drug Use No       FAMILY HISTORY  Family History   Problem Relation Age of Onset    Diabetes Sister     Other Sister         liver disease    Diabetes Brother     Heart Disease Neg Hx        CURRENT MEDICATIONS  Current Outpatient Medications   Medication Instructions    acetaminophen (TYLENOL) 500-1,000 mg, Oral, EVERY 6 HOURS PRN    amiodarone (CORDARONE) 200 mg, Oral, 2 TIMES DAILY    apixaban (ELIQUIS) 2.5 mg, Oral, 2 TIMES DAILY    aspirin EC (ECOTRIN) 81 mg, Oral, DAILY    atorvastatin (LIPITOR) 20 mg, Oral, NIGHTLY    carvedilol (COREG) 6.25 mg, Oral, 2 TIMES DAILY    clotrimazole (MYCELEX) 10 mg, Oral, 4 TIMES DAILY    fluticasone (FLONASE) 50 mcg, Nasal, DAILY    furosemide (LASIX) 40 mg, Oral, DAILY    glucose blood (TRUE METRIX BLOOD GLUCOSE TEST) strip 1 Strip, Other, 3 TIMES DAILY WITH MEALS    HYDROcodone-acetaminophen (NORCO) 5-325 MG Tab per tablet 1 Tablet, Oral, EVERY 6 HOURS PRN    insulin glargine (LANTUS) 8 Units, Subcutaneous, DAILY    Lancets Use one Freestyle Pearl lancet to test blood sugar once daily .    levothyroxine (SYNTHROID) 50 mcg, Oral, EACH MORNING ON EMPTY STOMACH    losartan (COZAAR) 25 mg, Oral, DAILY    mycophenolate (CELLCEPT) 1,000 mg, Oral, 2 TIMES DAILY    NovoLOG FlexPen 2-8 Units, Subcutaneous, 3 TIMES DAILY BEFORE MEALS, Sliding Scale    pantoprazole (PROTONIX) 40 mg, Oral, DAILY    predniSONE (DELTASONE) 10 mg, Oral, DAILY    sodium  "bicarbonate (SODIUM BICARBONATE) 1,300 mg, Oral, 3 times daily    sulfamethoxazole-trimethoprim (BACTRIM) 400-80 MG Tab 1 Tablet, Oral, DAILY, Pt started on 11/1/2022 for 3 month course, per pts son    tacrolimus (PROGRAF) 1 mg, Oral, 2 TIMES DAILY    valGANciclovir (VALCYTE) 450 mg, Oral, DAILY        ALLERGIES  No Known Allergies    PHYSICAL EXAM  VITAL SIGNS: /53   Pulse (!) 55   Temp 36 °C (96.8 °F) (Temporal)   Resp 16   Ht 1.549 m (5' 1\")   Wt 55.3 kg (122 lb)   LMP  (LMP Unknown)   SpO2 100%   BMI 23.05 kg/m²     Nursing note and vitals reviewed.  Constitutional: Well-developed and well-nourished. No distress.   HENT: Head is normocephalic and atraumatic. Oropharynx is clear and moist without exudate or erythema.   Eyes: Pupils are equal, round, and reactive to light. Conjunctiva are normal.   Cardiovascular: Normal rate and regular rhythm. No murmur heard. Normal radial pulses.   Pulmonary/Chest: Breath sounds normal. No wheezes or rales. No chest wall tenderness.   Abdominal: Soft and non-tender. No distention   Musculoskeletal: Extremities exhibit normal range of motion without edema or tenderness. No calf tenderness or palpable cords.   Neurological: Awake, alert and oriented to person, place, and time. No focal deficits noted.  Skin: Skin is warm and dry. No rash.   Psychiatric: Normal mood and affect. Appropriate for clinical situation     DIAGNOSTIC STUDIES / PROCEDURES    EKG Interpretation  Interpreted by me as below    LABS  Results for orders placed or performed during the hospital encounter of 12/19/22   CBC w/ Differential   Result Value Ref Range    WBC 3.8 (L) 4.8 - 10.8 K/uL    RBC 3.55 (L) 4.20 - 5.40 M/uL    Hemoglobin 10.7 (L) 12.0 - 16.0 g/dL    Hematocrit 34.0 (L) 37.0 - 47.0 %    MCV 95.8 81.4 - 97.8 fL    MCH 30.1 27.0 - 33.0 pg    MCHC 31.5 (L) 33.6 - 35.0 g/dL    RDW 58.1 (H) 35.9 - 50.0 fL    Platelet Count 157 (L) 164 - 446 K/uL    MPV 11.2 9.0 - 12.9 fL    " Neutrophils-Polys 83.80 (H) 44.00 - 72.00 %    Lymphocytes 11.50 (L) 22.00 - 41.00 %    Monocytes 3.40 0.00 - 13.40 %    Eosinophils 0.50 0.00 - 6.90 %    Basophils 0.30 0.00 - 1.80 %    Immature Granulocytes 0.50 0.00 - 0.90 %    Nucleated RBC 0.00 /100 WBC    Neutrophils (Absolute) 3.20 2.00 - 7.15 K/uL    Lymphs (Absolute) 0.44 (L) 1.00 - 4.80 K/uL    Monos (Absolute) 0.13 0.00 - 0.85 K/uL    Eos (Absolute) 0.02 0.00 - 0.51 K/uL    Baso (Absolute) 0.01 0.00 - 0.12 K/uL    Immature Granulocytes (abs) 0.02 0.00 - 0.11 K/uL    NRBC (Absolute) 0.00 K/uL   Complete Metabolic Panel (CMP)   Result Value Ref Range    Sodium 140 135 - 145 mmol/L    Potassium 4.4 3.6 - 5.5 mmol/L    Chloride 108 96 - 112 mmol/L    Co2 21 20 - 33 mmol/L    Anion Gap 11.0 7.0 - 16.0    Glucose 115 (H) 65 - 99 mg/dL    Bun 22 8 - 22 mg/dL    Creatinine 1.07 0.50 - 1.40 mg/dL    Calcium 10.4 (H) 8.4 - 10.2 mg/dL    AST(SGOT) 15 12 - 45 U/L    ALT(SGPT) 9 2 - 50 U/L    Alkaline Phosphatase 121 (H) 30 - 99 U/L    Total Bilirubin 0.2 0.1 - 1.5 mg/dL    Albumin 4.4 3.2 - 4.9 g/dL    Total Protein 6.7 6.0 - 8.2 g/dL    Globulin 2.3 1.9 - 3.5 g/dL    A-G Ratio 1.9 g/dL   Troponin STAT   Result Value Ref Range    Troponin T 25 (H) 6 - 19 ng/L   proBrain Natriuretic Peptide, NT   Result Value Ref Range    NT-proBNP 1926 (H) 0 - 125 pg/mL   Magnesium   Result Value Ref Range    Magnesium 1.2 (L) 1.5 - 2.5 mg/dL   PT/INR   Result Value Ref Range    PT 14.1 12.0 - 14.6 sec    INR 1.10 0.87 - 1.13   APTT   Result Value Ref Range    APTT 30.2 24.7 - 36.0 sec   CORRECTED CALCIUM   Result Value Ref Range    Correct Calcium 10.1 8.5 - 10.5 mg/dL   ESTIMATED GFR   Result Value Ref Range    GFR (CKD-EPI) 55 (A) >60 mL/min/1.73 m 2   EKG   Result Value Ref Range    Report       [facility] Emergency Dept.    Test Date:  2022-12-19  Pt Name:    DIOR NICHOLS    Department: [facility]  MRN:        [id number]                      Room:        -ROOM 2  Gender:     Female                       Technician: [id number]  :        [id number]                   Requested By:ER TRIAGE PROTOCOL  Order #:    [id number]                    Reading MD: EDUARDA MATTHEWS MD    Measurements  Intervals                                Axis  Rate:       54                           P:          -19  VA:         156                          QRS:        57  QRSD:       92                           T:          72  QT:         456  QTc:        433    Interpretive Statements  SINUS BRADYCARDIA  ABNORMAL T, CONSIDER ISCHEMIA, LATERAL LEADS  Compared to ECG 2022 14:15:55  T-wave abnormality now present  Sinus rhythm no longer present  ST (T wave) deviation no longer present  Possible ischemia still present  Electronically Signed On 2022 9:41:48 PST by VIJAYA MATTHEWS MD        All labs reviewed by me.    RADIOLOGY  DX-CHEST-PORTABLE (1 VIEW)   Final Result      1.  No acute cardiopulmonary abnormality identified.      2.  Enlargement of the cardiac silhouette      NM-CARDIAC STRESS TEST    (Results Pending)     The radiologist's interpretation of all radiological studies have been reviewed by me.    COURSE & MEDICAL DECISION MAKING  Nursing notes, VS, PMSFHx reviewed in chart.     Patient denies a cardiac history, however review of past medical records shows the patient had an NSTEMI on 2022, a stent in her RCA in 2016, and diagnoses of hypertension, hyperlipidemia, CHF, and CAD.      8:04 AM - Patient seen and examined at bedside. Ordered DX-Chest, EKG, Trending Troponins, CBC w/ differential, CMP, proBrain Natriuretic Peptide, Magnesium, PT/INR, and APTT to evaluate her symptoms. The differential diagnoses include but are not limited to: ACS, CHF, Pneumonia, Chest wall pain.     9:02 AM - Patient's Troponin is elevated at 25. I have paged the hospitalist.  Given the patient's risk factors, comorbidities, known coronary artery disease, history of cardiac stent  I feel that she warrants admission for further evaluation.    9:37 AM - I discussed the patient's case and the above findings with Dr. Campa (Hospitalist) who agrees to evaluate the patient for admission.      DISPOSITION:  Patient will be hospitalized by Dr. Campa in guarded condition.     FINAL IMPRESSION  1. Acute chest pain    2. Elevated troponin    3. History of coronary artery disease    4. History of heart artery stent    5. Elevated brain natriuretic peptide (BNP) level    6. Congestive heart failure, unspecified HF chronicity, unspecified heart failure type (HCC)          Zuleyka BRONSON (Scribe), am scribing for, and in the presence of, Wilfredo Haywood M.D..    Electronically signed by: Zuleyka Dallas (Scribe), 12/19/2022    IWilfredo M.D. personally performed the services described in this documentation, as scribed by Zuleyka Dallas in my presence, and it is both accurate and complete.    The note accurately reflects work and decisions made by me.  Wilfredo Haywood M.D.  12/19/2022  1:09 PM

## 2022-12-19 NOTE — ASSESSMENT & PLAN NOTE
The ASCVD Risk score (Jose JONES, et al., 2019) failed to calculate for the following reasons:    The patient has a prior MI or stroke diagnosis    -Patient presenting symptoms of precordial chest pain radiating to the left arm, lasting for less than 1 hour.  She has multiple cardiac risk factors. As patient is intermediate risk, reasonable to pursue further noninvasive cardiac work-up.  No other identifiable etiology.  She has no reproducible chest tenderness or respiratory symptoms, and Eid sign is negative.  -Start empiric aspirin for now until cardiac cause ruled out.  Check lipid profile and hemoglobin A1c for further risk stratification. We will do further cardiac monitoring to rule out arrhythmias.  -I will obtain serial troponins, and an echocardiogram.  If troponins remain negative, would proceed with cardiac stress test with nuclear cardiac stress test.  -Start as needed sublingual nitroglycerin, and morphine for pain recurrence.   -For completion, I will obtain a d-dimer, and if elevated will proceed with ruling out PE.  Check urine drug screen.  Check mini viral panel.  -For possibility of GI related chest pain, I will continue her on Protonix, along with PRN GI cocktail, and Maalox.

## 2022-12-19 NOTE — ED TRIAGE NOTES
"Chief Complaint   Patient presents with    Chest Pain     Started this am after waking up, described as \"feeling unwell\", states pain radiates to Left Arm, denies N/V     /53   Pulse (!) 55   Temp 36 °C (96.8 °F) (Temporal)   Resp 16   Ht 1.549 m (5' 1\")   Wt 55.3 kg (122 lb)   LMP  (LMP Unknown)   SpO2 100%   BMI 23.05 kg/m²     Pt ambulated to ED w/ visitor for c/o CP radiating to Left Arm, started this am.  Pt states pain has improved but c/o \"feeling unwell.\"  Pt states had a Right kidney transplant two months ago.  Pt states had similar pain 8 days ago \"but it went away after I threw up.\"    "

## 2022-12-19 NOTE — ED NOTES
Name:     ID Number:  [id number]    Content Discussed:  Discussed her scheduled meds and her last doses.  Pt states she last took her scheduled meds last night before bed. She is unsure of the names of her meds, she is concerned that she needs her morning doses. She speaks of her transplant meds and one that she sucks on 4 times a day.  Reassured that our pharmacy is working on her medication list and will give them to her as soon as approved by pharmacy.      Pharmacy called spoke with Jesus Alberto and he will work on med list.

## 2022-12-19 NOTE — ED NOTES
Name:     ID Number:  [id number]    Content Discussed:  Attempted to use  to discuss medications before administration.  Again family states they do not know the names of the meds she takes, states  assistts with this,  Granddaughter is going to go home and bring in the meds bottles for comparison.

## 2022-12-19 NOTE — ED NOTES
ERP at bedside. Pt agrees with plan of care discussed by ERP. AIDET acknowledged with patient. Dara in low position, side rail up for pt safety. Call light within reach. Will continue to monitor.

## 2022-12-19 NOTE — ASSESSMENT & PLAN NOTE
- Resume home Lantus.  Sliding scale insulin coverage while in-house.  Check HbA1c. Accu-Cheks before meals and at bedtime. Goal to keep BG between 140-180 per 2019 ADA guidelines.

## 2022-12-19 NOTE — ASSESSMENT & PLAN NOTE
- Further cardiac work-up as above.  Resume home Eliquis, Lipitor, aspirin.  Monitor on telemetry.

## 2022-12-19 NOTE — ASSESSMENT & PLAN NOTE
- Currently maintaining sinus rhythm.  - Resume amiodarone, and Eliquis, along with Coreg.  Monitor on telemetry.

## 2022-12-19 NOTE — ED NOTES
Attempting report to Med tele 307, awaiting call back.  [facility] is temporarily disconnected.

## 2022-12-19 NOTE — H&P
"Hospital Medicine History & Physical Note    Date of Service  12/19/2022    Primary Care Physician  MALGORZATA Concepcion.    Consultants  None      Code Status  Full Code    Chief Complaint  Chief Complaint   Patient presents with    Chest Pain     Started this am after waking up, described as \"feeling unwell\", states pain radiates to Left Arm, denies N/V       History of Presenting Illness  Tere Gallegos is a 72 y.o. female with history of paroxysmal atrial fibrillation, CAD with PCI to the RCA in 2016, HFpEF, hypertension, type 2 diabetes mellitus, hyperlipidemia, and hypothyroidism, with recent renal transplant and currently on immunosuppression, with recent admission back in November for paroxysmal atrial fibrillation for which she was started on amiodarone, who presented 12/19/2022 with chest pain.  Patient stated that she was doing well without any subjective complaints until this morning at around 7 AM when she had acute onset chest pain in the middle of her chest, which she described as \"strong\", and radiated to the left arm.  This was associated with palpable heart beating, with her heart beating \"fast and hard\".  She had no associated nausea, vomiting, diaphoresis, or shortness of breath.  The episode lasted for less than 1 hour before completely resolving.  Doubly, she had no other complaints.  She denies any fevers, chills, abdominal pain, diarrhea, cough.  She then decided to go to the ED today.      ED course:  The patient was initially evaluated.  Vital signs were stable albeit with elevated blood pressure.  She has not taken any of her medications this morning.  She was not hypoxic.  Initial blood work-up showed no leukocytosis, with stable hemoglobin and platelet count, normal electrolytes and renal function.  Alkaline phosphatase was 121.  Magnesium level was low at 1.2.  Troponin was 25, and BNP was 1926.  EKG (personally reviewed) showed sinus rhythm, with T wave inversions in " leads V5 to V6, with no ST elevation.  Chest x-ray (personally reviewed) only showed enlarged cardiac silhouette with no infiltrates, consolidation, effusion or pneumothorax.  She was subsequently admitted to the hospital service for further cardiac work-up.    I discussed the plan of care with patient, bedside RN, and ERP .    Review of Systems  ROS    Pertinent positives/negatives as mentioned above.     A complete review of systems was personally done by me. All other systems were negative.       Past Medical History   has a past medical history of Acquired hypothyroidism (05/04/2020), CAD (coronary artery disease), Chronic diastolic heart failure (HCC) (05/04/2020), Coronary artery disease due to lipid rich plaque, Dental disorder, Diabetes ([facility]), ESRD (end stage renal disease) on dialysis (HCC) (05/04/2020), Hemodialysis patient ([facility]), Hyperlipidemia, Hypertension, Kidney transplant candidate, Kidney transplant recipient (10/31/2022), Presence of drug-eluting stent in right coronary artery, QT prolongation (01/22/2020), RLS (restless legs syndrome) (08/05/2016), and Transaminitis (12/22/2018).    Surgical History   has a past surgical history that includes recovery (08/16/2016); other abdominal surgery; other (Left, 2014); zzz cardiac cath (08/16/2016); zzz cardiac cath (09/07/2016); and other abdominal surgery (Right, 10/31/2022).     Family History  family history includes Diabetes in her brother and sister; Other in her sister.   Family history reviewed with patient. There is no family history that is pertinent to the chief complaint.     Social History   reports that she has never smoked. She has never used smokeless tobacco. She reports that she does not drink alcohol and does not use drugs.    Allergies  No Known Allergies    Medications  Prior to Admission Medications   Prescriptions Last Dose Informant Patient Reported? Taking?   HYDROcodone-acetaminophen (NORCO) 5-325 MG Tab per tablet UNK at UNK  Patient's Home Pharmacy, Family Member Yes No   Sig: Take 1 Tablet by mouth every 6 hours as needed. Indications: Pain   Lancets UNK at [facility] Patient's Home Pharmacy, Family Member No No   Sig: Use one Freestyle Pearl lancet to test blood sugar once daily .   acetaminophen (TYLENOL) 500 MG Tab UNK at [facility] Family Member, Patient's Home Pharmacy Yes No   Sig: Take 500-1,000 mg by mouth every 6 hours as needed. Indications: Pain   amiodarone (CORDARONE) 200 MG Tab UNK at [facility] Patient's Home Pharmacy, Family Member No No   Sig: Take 1 Tablet by mouth 2 times a day for 7 days.   apixaban (ELIQUIS) 2.5mg Tab UNK at [facility] Patient's Home Pharmacy, Family Member No No   Sig: Take 1 Tablet by mouth 2 times a day. Indications: Thromboembolism secondary to Atrial Fibrillation   aspirin EC (ECOTRIN) 81 MG Tablet Delayed Response UNK at [facility] Patient's Home Pharmacy, Family Member Yes No   Sig: Take 1 Tablet by mouth every day.   atorvastatin (LIPITOR) 20 MG Tab UNK at [facility] Patient's Home Pharmacy, Family Member Yes No   Sig: Take 1 Tablet by mouth every evening.   carvedilol (COREG) 6.25 MG Tab UNK at [facility] Patient's Home Pharm[facility], Family Member Yes No   Sig: Take 6.25 mg by mouth 2 times a day.   clotrimazole (MYCELEX) 10 MG Thanh thanh UNK at [facility] Patient's Home Pharmacy, Family Member Yes No   Sig: Take 1 Thanh by mouth 4 times a day.   docusate sodium (COLACE) 100 MG Cap UNK at [facility] Patient's Home Pharmacy, Family Member Yes Yes   Sig: Take 100-200 mg by mouth 2 times a day as needed for Constipation. Indications: Constipation   fluticasone (FLONASE) 50 MCG/ACT nasal spray UNK at [facility] Patient's Home Pharmacy, Family Member No No   Sig: ADMINISTER 1 SPRAY INTO AFFECTED NOSTRIL(S) EVERY DAY.   furosemide (LASIX) 40 MG Tab UNK at [facility] Patient's Home Pharmacy, Family Member No No   Sig: Take 1 Tablet by mouth every day.   glucose blood (TRUE METRIX BLOOD GLUCOSE TEST) strip UNK at [facility] Patient's Home Pharmacy, Family Member No No   Si  Strip by Other route 3 times a day with meals.   insulin aspart (NOVOLOG FLEXPEN) 100 UNIT/ML injection PEN UNK at [facility] Patient's Home Pharmacy, Family Member Yes No   Sig: Inject 2-8 Units under the skin 3 times a day before meals. Sliding Scale   insulin glargine 100 UNIT/ML SC SOLN UNK at [facility] Patient's Home Pharmacy, Family Member Yes No   Sig: Inject 8 Units under the skin every day.   levothyroxine (SYNTHROID) 50 MCG Tab UNK at [facility] Patient's Home Pharmacy, Family Member Yes No   Sig: Take 50 mcg by mouth every morning on an empty stomach.   losartan (COZAAR) 25 MG Tab UNKUNK at [facility] Patient's Home Pharmacy, Family Member No No   Sig: Take 1 Tablet by mouth every day.   mycophenolate (CELLCEPT) 500 MG tablet UNK at [facility] Patient's Home Pharmacy, Family Member Yes No   Sig: Take 2 Tablets by mouth 2 times a day.   pantoprazole (PROTONIX) 40 MG Tablet Delayed Response UNK at [facility] Patient, Patient's Home Pharmacy, Family Member Yes No   Sig: Take 1 Tablet by mouth every day.   predniSONE (DELTASONE) 5 MG Tab UNK at [facility] Patient, Patient's Home Pharmacy, Family Member Yes No   Sig: Take 2 Tablets by mouth every day.   sodium bicarbonate (SODIUM BICARBONATE) 650 MG Tab UNK at [facility] Patient's Home Pharmacy, Family Member No No   Sig: Take 2 Tablets by mouth in the morning, at noon, and at bedtime.   sulfamethoxazole-trimethoprim (BACTRIM) 400-80 MG Tab UNK at [facility] Patient, Patient's Home Pharmacy, Family Member Yes No   Sig: Take 1 Tablet by mouth every day. Pt started on 11/1/2022 for 3 month course, per pts son   tacrolimus (PROGRAF) 1 MG Cap UNK at [facility] Patient, Patient's Home Pharmacy, Family Member Yes No   Sig: Take 1 Capsule by mouth 2 times a day.   valGANciclovir (VALCYTE) 450 MG tablet UNK at [facility] Patient's Home Pharmacy, Family Member No No   Sig: Take 1 Tablet by mouth every day.      Facility-Administered Medications: None       Physical Exam  Temp:  [36 °C (96.8 °F)] 36 °C (96.8 °F)  Pulse:  [54-55] 54  Resp:   [16-18] 18  BP: (125-173)/(53-61) 173/61  SpO2:  [100 %] 100 %  Blood Pressure : (!) 173/61   Temperature: 36 °C (96.8 °F)   Pulse: (!) 54   Respiration: 18   Pulse Oximetry: 100 %       Physical Exam  Vitals reviewed.   Constitutional:       General: She is not in acute distress.     Appearance: Normal appearance. She is normal weight. She is not ill-appearing or diaphoretic.   HENT:      Head: Normocephalic and atraumatic.      Right Ear: External ear normal.      Left Ear: External ear normal.      Mouth/Throat:      Mouth: Mucous membranes are moist.      Pharynx: No oropharyngeal exudate or posterior oropharyngeal erythema.   Eyes:      General: No scleral icterus.     Extraocular Movements: Extraocular movements intact.      Conjunctiva/sclera: Conjunctivae normal.      Pupils: Pupils are equal, round, and reactive to light.   Cardiovascular:      Rate and Rhythm: Normal rate and regular rhythm.      Heart sounds: Normal heart sounds. No murmur heard.  Pulmonary:      Effort: Pulmonary effort is normal. No respiratory distress.      Breath sounds: Normal breath sounds. No stridor. No wheezing, rhonchi or rales.   Chest:      Chest wall: No tenderness.   Abdominal:      General: Bowel sounds are normal. There is no distension.      Palpations: Abdomen is soft. There is no mass.      Tenderness: There is no abdominal tenderness. There is no guarding or rebound.      Comments: Negative Eid's   Musculoskeletal:         General: No swelling. Normal range of motion.      Cervical back: Normal range of motion and neck supple. No rigidity. No muscular tenderness.      Right lower leg: No edema.      Left lower leg: No edema.   Lymphadenopathy:      Cervical: No cervical adenopathy.   Skin:     General: Skin is warm and dry.      Coloration: Skin is not jaundiced.      Findings: No rash.   Neurological:      General: No focal deficit present.      Mental Status: She is alert and oriented to person, place, and time.  Mental status is at baseline.      Cranial Nerves: No cranial nerve deficit.   Psychiatric:         Mood and Affect: Mood normal.         Behavior: Behavior normal.         Thought Content: Thought content normal.         Judgment: Judgment normal.       Laboratory:  Recent Labs     12/19/22  0810   WBC 3.8*   RBC 3.55*   HEMOGLOBIN 10.7*   HEMATOCRIT 34.0*   MCV 95.8   MCH 30.1   MCHC 31.5*   RDW 58.1*   PLATELETCT 157*   MPV 11.2     Recent Labs     12/19/22  0810   SODIUM 140   POTASSIUM 4.4   CHLORIDE 108   CO2 21   GLUCOSE 115*   BUN 22   CREATININE 1.07   CALCIUM 10.4*     Recent Labs     12/19/22  0810   ALTSGPT 9   ASTSGOT 15   ALKPHOSPHAT 121*   TBILIRUBIN 0.2   GLUCOSE 115*     Recent Labs     12/19/22  0810   APTT 30.2   INR 1.10     Recent Labs     12/19/22  0810   NTPROBNP 1926*     Recent Labs     12/19/22  0810   TRIGLYCERIDE 177*   HDL 39*   LDL 43     Recent Labs     12/19/22  0810   TROPONINT 25*       Imaging:  DX-CHEST-PORTABLE (1 VIEW)   Final Result      1.  No acute cardiopulmonary abnormality identified.      2.  Enlargement of the cardiac silhouette      NM-CARDIAC STRESS TEST    (Results Pending)           Assessment/Plan:  Justification for Admission Status  I anticipate this patient is appropriate for observation status at this time because of need for further cardiac work-up for her chest pain.    Patient will need a Telemetry bed on MEDICAL service .  The need is secondary to chest pain, PAF.    * Atypical chest pain- (present on admission)  Assessment & Plan  The ASCVD Risk score (Milam DK, et al., 2019) failed to calculate for the following reasons:    The patient has a prior MI or stroke diagnosis    -Patient presenting symptoms of precordial chest pain radiating to the left arm, lasting for less than 1 hour.  She has multiple cardiac risk factors. As patient is intermediate risk, reasonable to pursue further noninvasive cardiac work-up.  No other identifiable etiology.  She has no  reproducible chest tenderness or respiratory symptoms, and Eid sign is negative.  -Start empiric aspirin for now until cardiac cause ruled out.  Check lipid profile and hemoglobin A1c for further risk stratification. We will do further cardiac monitoring to rule out arrhythmias.  -I will obtain serial troponins, and an echocardiogram.  If troponins remain negative, would proceed with cardiac stress test with nuclear cardiac stress test.  -Start as needed sublingual nitroglycerin, and morphine for pain recurrence.   -For completion, I will obtain a d-dimer, and if elevated will proceed with ruling out PE.  Check urine drug screen.  Check mini viral panel.  -For possibility of GI related chest pain, I will continue her on Protonix, along with PRN GI cocktail, and Maalox.      Hypomagnesemia- (present on admission)  Assessment & Plan  - Replace with 2 g of IV magnesium sulfate.  Repeat BMP and magnesium level in the morning.    Essential hypertension- (present on admission)  Assessment & Plan  - Resume home Coreg, Lasix, and losartan.  Monitor blood pressure trend closely with as needed IV labetalol for significant hypertension.    Type 2 diabetes mellitus (HCC)- (present on admission)  Assessment & Plan  - Resume home Lantus.  Sliding scale insulin coverage while in-house.  Check HbA1c. Accu-Cheks before meals and at bedtime. Goal to keep BG between 140-180 per 2019 ADA guidelines.      Kidney transplant recipient- (present on admission)  Assessment & Plan  - Resume transplant medications including tacrolimus, CellCept, and prednisone.  We will also resume her prophylactic medications including Bactrim, clotrimazole, and valganciclovir.    Paroxysmal A-fib (HCC)- (present on admission)  Assessment & Plan  - Currently maintaining sinus rhythm.  - Resume amiodarone, and Eliquis, along with Coreg.  Monitor on telemetry.    Acquired hypothyroidism- (present on admission)  Assessment & Plan  - Resume home  Synthroid.    Chronic heart failure with preserved ejection fraction (HFpEF) ([facility])- (present on admission)  Assessment & Plan  - She does not appear to be volume overloaded.  -Resume home Lasix, along with Coreg and Cozaar.    Coronary artery disease with angina pectoris with documented spasm ([facility])- (present on admission)  Assessment & Plan  - Further cardiac work-up as above.  Resume home Eliquis, Lipitor, aspirin.  Monitor on telemetry.      VTE prophylaxis: therapeutic anticoagulation with eliquis

## 2022-12-20 ENCOUNTER — APPOINTMENT (OUTPATIENT)
Dept: RADIOLOGY | Facility: MEDICAL CENTER | Age: 72
End: 2022-12-20
Attending: INTERNAL MEDICINE
Payer: MEDICARE

## 2022-12-20 VITALS
BODY MASS INDEX: 23.6 KG/M2 | HEIGHT: 61 IN | DIASTOLIC BLOOD PRESSURE: 47 MMHG | SYSTOLIC BLOOD PRESSURE: 147 MMHG | TEMPERATURE: 97.4 F | OXYGEN SATURATION: 97 % | WEIGHT: 125 LBS | RESPIRATION RATE: 18 BRPM | HEART RATE: 50 BPM

## 2022-12-20 LAB
ANION GAP SERPL CALC-SCNC: 9 MMOL/L (ref 7–16)
BASOPHILS # BLD AUTO: 0.3 % (ref 0–1.8)
BASOPHILS # BLD: 0.01 K/UL (ref 0–0.12)
BUN SERPL-MCNC: 21 MG/DL (ref 8–22)
CALCIUM SERPL-MCNC: 10 MG/DL (ref 8.4–10.2)
CHLORIDE SERPL-SCNC: 107 MMOL/L (ref 96–112)
CO2 SERPL-SCNC: 21 MMOL/L (ref 20–33)
CREAT SERPL-MCNC: 1.17 MG/DL (ref 0.5–1.4)
EOSINOPHIL # BLD AUTO: 0.01 K/UL (ref 0–0.51)
EOSINOPHIL NFR BLD: 0.3 % (ref 0–6.9)
ERYTHROCYTE [DISTWIDTH] IN BLOOD BY AUTOMATED COUNT: 56.9 FL (ref 35.9–50)
GFR SERPLBLD CREATININE-BSD FMLA CKD-EPI: 49 ML/MIN/1.73 M 2
GLUCOSE BLD STRIP.AUTO-MCNC: 169 MG/DL (ref 65–99)
GLUCOSE BLD STRIP.AUTO-MCNC: 93 MG/DL (ref 65–99)
GLUCOSE SERPL-MCNC: 82 MG/DL (ref 65–99)
HCT VFR BLD AUTO: 29.6 % (ref 37–47)
HGB BLD-MCNC: 9.2 G/DL (ref 12–16)
IMM GRANULOCYTES # BLD AUTO: 0.01 K/UL (ref 0–0.11)
IMM GRANULOCYTES NFR BLD AUTO: 0.3 % (ref 0–0.9)
LYMPHOCYTES # BLD AUTO: 0.66 K/UL (ref 1–4.8)
LYMPHOCYTES NFR BLD: 20.2 % (ref 22–41)
MAGNESIUM SERPL-MCNC: 1.5 MG/DL (ref 1.5–2.5)
MCH RBC QN AUTO: 29.6 PG (ref 27–33)
MCHC RBC AUTO-ENTMCNC: 31.1 G/DL (ref 33.6–35)
MCV RBC AUTO: 95.2 FL (ref 81.4–97.8)
MONOCYTES # BLD AUTO: 0.15 K/UL (ref 0–0.85)
MONOCYTES NFR BLD AUTO: 4.6 % (ref 0–13.4)
NEUTROPHILS # BLD AUTO: 2.42 K/UL (ref 2–7.15)
NEUTROPHILS NFR BLD: 74.3 % (ref 44–72)
NRBC # BLD AUTO: 0 K/UL
NRBC BLD-RTO: 0 /100 WBC
PLATELET # BLD AUTO: 143 K/UL (ref 164–446)
PMV BLD AUTO: 11 FL (ref 9–12.9)
POTASSIUM SERPL-SCNC: 5 MMOL/L (ref 3.6–5.5)
RBC # BLD AUTO: 3.11 M/UL (ref 4.2–5.4)
SODIUM SERPL-SCNC: 137 MMOL/L (ref 135–145)
WBC # BLD AUTO: 3.3 K/UL (ref 4.8–10.8)

## 2022-12-20 PROCEDURE — 78452 HT MUSCLE IMAGE SPECT MULT: CPT | Mod: 26 | Performed by: INTERNAL MEDICINE

## 2022-12-20 PROCEDURE — 85025 COMPLETE CBC W/AUTO DIFF WBC: CPT

## 2022-12-20 PROCEDURE — G0378 HOSPITAL OBSERVATION PER HR: HCPCS

## 2022-12-20 PROCEDURE — 80048 BASIC METABOLIC PNL TOTAL CA: CPT

## 2022-12-20 PROCEDURE — 83735 ASSAY OF MAGNESIUM: CPT

## 2022-12-20 PROCEDURE — 700102 HCHG RX REV CODE 250 W/ 637 OVERRIDE(OP): Performed by: INTERNAL MEDICINE

## 2022-12-20 PROCEDURE — 99217 PR OBSERVATION CARE DISCHARGE: CPT | Performed by: INTERNAL MEDICINE

## 2022-12-20 PROCEDURE — 93018 CV STRESS TEST I&R ONLY: CPT | Performed by: INTERNAL MEDICINE

## 2022-12-20 PROCEDURE — 82962 GLUCOSE BLOOD TEST: CPT

## 2022-12-20 PROCEDURE — 94760 N-INVAS EAR/PLS OXIMETRY 1: CPT

## 2022-12-20 PROCEDURE — 700102 HCHG RX REV CODE 250 W/ 637 OVERRIDE(OP): Performed by: HOSPITALIST

## 2022-12-20 PROCEDURE — 700111 HCHG RX REV CODE 636 W/ 250 OVERRIDE (IP): Performed by: INTERNAL MEDICINE

## 2022-12-20 PROCEDURE — 96372 THER/PROPH/DIAG INJ SC/IM: CPT | Mod: XU

## 2022-12-20 PROCEDURE — A9502 TC99M TETROFOSMIN: HCPCS

## 2022-12-20 PROCEDURE — A9270 NON-COVERED ITEM OR SERVICE: HCPCS | Performed by: INTERNAL MEDICINE

## 2022-12-20 PROCEDURE — 36415 COLL VENOUS BLD VENIPUNCTURE: CPT

## 2022-12-20 PROCEDURE — A9270 NON-COVERED ITEM OR SERVICE: HCPCS | Performed by: HOSPITALIST

## 2022-12-20 RX ORDER — CHOLECALCIFEROL (VITAMIN D3) 125 MCG
5 CAPSULE ORAL NIGHTLY
Status: DISCONTINUED | OUTPATIENT
Start: 2022-12-20 | End: 2022-12-20 | Stop reason: HOSPADM

## 2022-12-20 RX ORDER — AMIODARONE HYDROCHLORIDE 200 MG/1
200 TABLET ORAL 2 TIMES DAILY
Qty: 14 TABLET | Refills: 0
Start: 2022-12-20 | End: 2022-12-27

## 2022-12-20 RX ORDER — LANOLIN ALCOHOL/MO/W.PET/CERES
400 CREAM (GRAM) TOPICAL DAILY
Qty: 10 TABLET | Refills: 0 | Status: SHIPPED | OUTPATIENT
Start: 2022-12-20 | End: 2023-03-08

## 2022-12-20 RX ORDER — OMEPRAZOLE 20 MG/1
20 CAPSULE, DELAYED RELEASE ORAL DAILY
Qty: 30 CAPSULE | Refills: 0 | Status: SHIPPED | OUTPATIENT
Start: 2022-12-21 | End: 2023-03-22

## 2022-12-20 RX ORDER — LANOLIN ALCOHOL/MO/W.PET/CERES
400 CREAM (GRAM) TOPICAL DAILY
Status: DISCONTINUED | OUTPATIENT
Start: 2022-12-20 | End: 2022-12-20 | Stop reason: HOSPADM

## 2022-12-20 RX ADMIN — REGADENOSON 0.4 MG: 0.08 INJECTION, SOLUTION INTRAVENOUS at 08:36

## 2022-12-20 RX ADMIN — CARVEDILOL 6.25 MG: 6.25 TABLET, FILM COATED ORAL at 06:46

## 2022-12-20 RX ADMIN — ASPIRIN 81 MG: 81 TABLET, COATED ORAL at 06:48

## 2022-12-20 RX ADMIN — APIXABAN 2.5 MG: 2.5 TABLET, FILM COATED ORAL at 06:46

## 2022-12-20 RX ADMIN — TACROLIMUS 1 MG: 1 CAPSULE ORAL at 06:48

## 2022-12-20 RX ADMIN — MYCOPHENOLATE MOFETIL 1000 MG: 250 CAPSULE ORAL at 06:46

## 2022-12-20 RX ADMIN — SODIUM BICARBONATE 650 MG TABLET 1300 MG: at 09:41

## 2022-12-20 RX ADMIN — VALGANCICLOVIR 450 MG: 450 TABLET, FILM COATED ORAL at 06:47

## 2022-12-20 RX ADMIN — FLUTICASONE PROPIONATE 50 MCG: 50 SPRAY, METERED NASAL at 06:48

## 2022-12-20 RX ADMIN — SULFAMETHOXAZOLE AND TRIMETHOPRIM 0.5 TABLET: 800; 160 TABLET ORAL at 06:47

## 2022-12-20 RX ADMIN — LEVOTHYROXINE SODIUM 50 MCG: 0.05 TABLET ORAL at 06:47

## 2022-12-20 RX ADMIN — SENNOSIDES AND DOCUSATE SODIUM 2 TABLET: 50; 8.6 TABLET ORAL at 06:48

## 2022-12-20 RX ADMIN — CLOTRIMAZOLE 10 MG: 10 LOZENGE ORAL; TOPICAL at 12:01

## 2022-12-20 RX ADMIN — Medication 5 MG: at 00:49

## 2022-12-20 RX ADMIN — Medication 400 MG: at 13:10

## 2022-12-20 RX ADMIN — AMIODARONE HYDROCHLORIDE 200 MG: 200 TABLET ORAL at 06:47

## 2022-12-20 RX ADMIN — PREDNISONE 10 MG: 10 TABLET ORAL at 06:48

## 2022-12-20 RX ADMIN — FUROSEMIDE 40 MG: 40 TABLET ORAL at 06:46

## 2022-12-20 RX ADMIN — LOSARTAN POTASSIUM 25 MG: 25 TABLET, FILM COATED ORAL at 06:46

## 2022-12-20 RX ADMIN — INSULIN HUMAN 1 UNITS: 100 INJECTION, SOLUTION PARENTERAL at 11:55

## 2022-12-20 RX ADMIN — CLOTRIMAZOLE 10 MG: 10 LOZENGE ORAL; TOPICAL at 09:41

## 2022-12-20 RX ADMIN — OMEPRAZOLE 20 MG: 20 CAPSULE, DELAYED RELEASE ORAL at 06:48

## 2022-12-20 ASSESSMENT — PAIN DESCRIPTION - PAIN TYPE: TYPE: ACUTE PAIN

## 2022-12-20 NOTE — CARE PLAN
The patient is Watcher - Medium risk of patient condition declining or worsening    Shift Goals  Clinical Goals: Stress test, monitor chest pain  Patient Goals: COntrol chest pain    Progress made toward(s) clinical / shift goals:      Discussed POC with pt including stress test planned for tomorrow morning. Pt verbalizes understanding. At this time pt has had no chest pain since arrival to the unit. Will continue to monitor.     Problem: Knowledge Deficit - Standard  Goal: Patient and family/care givers will demonstrate understanding of plan of care, disease process/condition, diagnostic tests and medications  Outcome: Progressing       Patient is not progressing towards the following goals:

## 2022-12-20 NOTE — PROGRESS NOTES
Monitor Summary     Rhythm:SR 47  Measurements: 0.16/0.08/0.42  ECTOPIES: rPAC        Normal Values  Rhythm SR  HR Range    Measurements 0.12-0.20 / 0.06-0.10  / 0.30-0.52

## 2022-12-20 NOTE — PROGRESS NOTES
Received bedside report from TRAVIS Daily, pt care assumed.  contacted,  assessment complete, evening meds administered. Pt A&Ox4, c/o 0/10 pain at this time. POC discussed with pt and verbalizes no questions. Pt denies any additional needs at this time. Bed locked and in lowest position, bed alarm off. Pt up self and gait steady. Pt educated on fall risk and verbalized understanding, call light within reach, hourly rounding initiated.

## 2022-12-20 NOTE — CARE PLAN
The patient is Watcher - Medium risk of patient condition declining or worsening    Shift Goals  Clinical Goals: stress test  Patient Goals: rest  Family Goals: TEE    Progress made toward(s) clinical / shift goals:    Problem: Knowledge Deficit - Standard  Goal: Patient and family/care givers will demonstrate understanding of plan of care, disease process/condition, diagnostic tests and medications  Outcome: Progressing       Patient is not progressing towards the following goals:

## 2022-12-20 NOTE — CARE PLAN
The patient is Stable - Low risk of patient condition declining or worsening    Shift Goals  Clinical Goals: NPO at midnight for stress test tomorrow.  Patient Goals: Rest  Family Goals: TEE    Interpretor utilized and plan of care/medications ordered explained to patient. Pt states she has no questions at this time.     Progress made toward(s) clinical / shift goals:    Problem: Knowledge Deficit - Standard  Goal: Patient and family/care givers will demonstrate understanding of plan of care, disease process/condition, diagnostic tests and medications  Outcome: Progressing

## 2022-12-20 NOTE — DISCHARGE SUMMARY
"Discharge Summary    CHIEF COMPLAINT ON ADMISSION  Chief Complaint   Patient presents with    Chest Pain     Started this am after waking up, described as \"feeling unwell\", states pain radiates to Left Arm, denies N/V       Reason for Admission  Chest Tightness     Admission Date  12/19/2022    CODE STATUS  Full Code    HPI & HOSPITAL COURSE  As per chart review:  \"72 y.o. female with history of paroxysmal atrial fibrillation, CAD with PCI to the RCA in 2016, HFpEF, hypertension, type 2 diabetes mellitus, hyperlipidemia, and hypothyroidism, with recent renal transplant and currently on immunosuppression, with recent admission back in November for paroxysmal atrial fibrillation for which she was started on amiodarone, who presented 12/19/2022 with chest pain.  Patient stated that she was doing well without any subjective complaints until this morning at around 7 AM when she had acute onset chest pain in the middle of her chest, which she described as \"strong\", and radiated to the left arm.  This was associated with palpable heart beating, with her heart beating \"fast and hard\".  She had no associated nausea, vomiting, diaphoresis, or shortness of breath.  The episode lasted for less than 1 hour before completely resolving.  She had no other complaints.  She denies any fevers, chills, abdominal pain, diarrhea, cough.  She then decided to go to the ED today.       ED course:  The patient was initially evaluated.  Vital signs were stable albeit with elevated blood pressure.  She has not taken any of her medications this morning.  She was not hypoxic.  Initial blood work-up showed no leukocytosis, with stable hemoglobin and platelet count, normal electrolytes and renal function.  Alkaline phosphatase was 121.  Magnesium level was low at 1.2.  Troponin was 25, and BNP was 1926.  EKG (personally reviewed) showed sinus rhythm, with T wave inversions in leads V5 to V6, with no ST elevation.  Chest x-ray (personally reviewed) " "only showed enlarged cardiac silhouette with no infiltrates, consolidation, effusion or pneumothorax.  She was subsequently admitted to the hospital service for further cardiac work-up.\"    Patient was admitted for further management and care.  Due to the patient's cardiac history, stress test was ordered.  Stress test was done and was negative.  Today on the day of discharge the patient denied any further pain.  She feels back to her baseline.  She will be discharged home.  She will require close follow-up with PCP and previous outpatient.  As per patient she has a follow-up with cardiology next week.    Of note the patient was found to have bradycardia below 60, however asymptomatic.  We did have a conversation regarding holding carvedilol if the patient has a bradycardia below 60.  She will require close follow-up with cardiologist outpatient. The patient recently had an echocardiogram done on 11/28/2022 with an ejection fraction of 60%.  Grade 2 diastolic dysfunction.    The patient seen at bedside, currently not complaining of any symptoms.  She would like to go home.  We will discharge patient home.  She will require close follow-up with PCP and cardiology as an outpatient.    Therefore, she is discharged in fair and stable condition to home with close outpatient follow-up.    The patient recovered much more quickly than anticipated on admission.    Discharge Date  12/20/2022    FOLLOW UP ITEMS POST DISCHARGE  Patient will require close follow up as outpatient.    DISCHARGE DIAGNOSES  Principal Problem:    Atypical chest pain POA: Yes  Active Problems:    Coronary artery disease with angina pectoris with documented spasm (HCC) POA: Yes      Overview: 2 Synergy NOEMI to 100% RCA stent placed    Chronic heart failure with preserved ejection fraction (HFpEF) ([facility]) POA: Yes    Acquired hypothyroidism POA: Yes    Paroxysmal A-fib (HCC) POA: Yes    Kidney transplant recipient POA: Yes    Type 2 diabetes mellitus (HCC) " POA: Yes    Essential hypertension POA: Yes    Hypomagnesemia POA: Yes  Resolved Problems:    Chronic atrial fibrillation (HCC) POA: Yes      FOLLOW UP  Future Appointments   Date Time Provider Department Center   12/22/2022  9:40 AM ANGELITA Concepcion [facility]   12/27/2022  4:00 PM ANGELITA Fisher [facility] None   1/3/2023  3:00 PM ANGELITA Guzmán   1/4/2023  9:00 AM ANGELITA Keen [facility] None   1/13/2023  4:00 PM Priyank Villar M.D. [address]   1/19/2023  3:20 PM ANGELITA Concepcion [facility]   2/6/2023  4:45 PM V EXAM 4 [facility] None     ANGELITA Concepcion  [address]  [address]  [place] [address]  [phone]    Schedule an appointment as soon as possible for a visit      Cardiologist    Schedule an appointment as soon as possible for a visit        MEDICATIONS ON DISCHARGE     Medication List        START taking these medications        Instructions   magnesium oxide 400 (240 Mg) MG Tabs   Take 1 Tablet by mouth every day.  Dose: 400 mg     omeprazole 20 MG delayed-release capsule  Start taking on: December 21, 2022  Commonly known as: PRILOSEC   Take 1 Capsule by mouth every day.  Dose: 20 mg            CONTINUE taking these medications        Instructions   acetaminophen 500 MG Tabs  Commonly known as: TYLENOL   Take 500-1,000 mg by mouth every 6 hours as needed. Indications: Pain  Dose: 500-1,000 mg     amiodarone 200 MG Tabs  Commonly known as: Cordarone   Take 1 Tablet by mouth 2 times a day for 7 days.  Dose: 200 mg     apixaban 2.5mg Tabs  Commonly known as: ELIQUIS   Take 1 Tablet by mouth 2 times a day. Indications: Thromboembolism secondary to Atrial Fibrillation  Dose: 2.5 mg     aspirin EC 81 MG Tbec  Commonly known as: ECOTRIN   Take 1 Tablet by mouth every day.  Dose: 81 mg     atorvastatin 20 MG Tabs  Commonly known as: LIPITOR   Take 1 Tablet by mouth every evening.  Dose: 20 mg     carvedilol 6.25 MG  Tabs  Commonly known as: COREG   Take 6.25 mg by mouth 2 times a day.  Dose: 6.25 mg     clotrimazole 10 MG Troc thanh  Commonly known as: Mycelex   Take 1 Thanh by mouth 4 times a day.  Dose: 10 mg     docusate sodium 100 MG Caps  Commonly known as: COLACE   Take 100-200 mg by mouth 2 times a day as needed for Constipation. Indications: Constipation  Dose: 100-200 mg     fluticasone 50 MCG/ACT nasal spray  Commonly known as: FLONASE   ADMINISTER 1 SPRAY INTO AFFECTED NOSTRIL(S) EVERY DAY.  Dose: 1 Spray     furosemide 40 MG Tabs  Commonly known as: LASIX   Take 1 Tablet by mouth every day.  Dose: 40 mg     HYDROcodone-acetaminophen 5-325 MG Tabs per tablet  Commonly known as: NORCO   Take 1 Tablet by mouth every 6 hours as needed. Indications: Pain  Dose: 1 Tablet     insulin glargine 100 UNIT/ML Soln  Commonly known as: Lantus   Inject 8 Units under the skin every day.  Dose: 8 Units     Lancets   Doctor's comments: Or per formulary preference. ICD-10 code: E11.9 Controlled type 2 Diabetes Mellitus with long term insulin use  Use one Freestyle Pearl lancet to test blood sugar once daily .     levothyroxine 50 MCG Tabs  Commonly known as: SYNTHROID   Take 50 mcg by mouth every morning on an empty stomach.  Dose: 50 mcg     losartan 25 MG Tabs  Commonly known as: COZAAR   Take 1 Tablet by mouth every day.  Dose: 25 mg     mycophenolate 500 MG tablet  Commonly known as: CELLCEPT   Take 2 Tablets by mouth 2 times a day.  Dose: 1,000 mg     NovoLOG FlexPen 100 UNIT/ML injection PEN  Generic drug: insulin aspart   Inject 2-8 Units under the skin 3 times a day before meals. Sliding Scale  Dose: 2-8 Units     pantoprazole 40 MG Tbec  Commonly known as: PROTONIX   Take 1 Tablet by mouth every day.  Dose: 40 mg     predniSONE 5 MG Tabs  Commonly known as: DELTASONE   Take 2 Tablets by mouth every day.  Dose: 10 mg     sodium bicarbonate 650 MG Tabs  Commonly known as: SODIUM BICARBONATE   Take 2 Tablets by mouth in the  morning, at noon, and at bedtime.  Dose: 1,300 mg     sulfamethoxazole-trimethoprim 400-80 MG Tabs  Commonly known as: BACTRIM   Take 1 Tablet by mouth every day. Pt started on 11/1/2022 for 3 month course, per pts son  Dose: 1 Tablet     tacrolimus 1 MG Caps  Commonly known as: PROGRAF   Take 1 Capsule by mouth 2 times a day.  Dose: 1 mg     True Metrix Blood Glucose Test strip  Generic drug: glucose blood   Doctor's comments: DX Code Needed  NEED NEW RX FOR LANCETS AS WELL.  1 Strip by Other route 3 times a day with meals.  Dose: 1 Each     valGANciclovir 450 MG tablet  Commonly known as: VALCYTE   Take 1 Tablet by mouth every day.  Dose: 450 mg              Allergies  No Known Allergies    DIET  Orders Placed This Encounter   Procedures    Diet Order Diet: Consistent CHO (Diabetic); Miscellaneous modifications: (optional): No Decaf, No Caffeine(for test)     Standing Status:   Standing     Number of Occurrences:   1     Order Specific Question:   Diet:     Answer:   Consistent CHO (Diabetic) [4]     Order Specific Question:   Miscellaneous modifications: (optional)     Answer:   No Decaf, No Caffeine(for test) [11]       ACTIVITY  As tolerated.  Weight bearing as tolerated    CONSULTATIONS    None    PROCEDURES    Stress Test:   NUCLEAR IMAGING INTERPRETATION   Normal Lexiscan myocardial perfusion study.   No evidence of ischemia or infarct.   SDS 0.   LVEF 70%.   No ischemic changes with Regadenoson.   No arrhythmias.   No chest pain.   ECG INTERPRETATION   Negative stress ECG for ischemia.      NM-CARDIAC STRESS TEST   Final Result      DX-CHEST-PORTABLE (1 VIEW)   Final Result      1.  No acute cardiopulmonary abnormality identified.      2.  Enlargement of the cardiac silhouette             LABORATORY  Lab Results   Component Value Date    SODIUM 137 12/20/2022    POTASSIUM 5.0 12/20/2022    CHLORIDE 107 12/20/2022    CO2 21 12/20/2022    GLUCOSE 82 12/20/2022    BUN 21 12/20/2022    CREATININE 1.17  12/20/2022        Lab Results   Component Value Date    WBC 3.3 (L) 12/20/2022    HEMOGLOBIN 9.2 (L) 12/20/2022    HEMATOCRIT 29.6 (L) 12/20/2022    PLATELETCT 143 (L) 12/20/2022        Total time of the discharge process exceeds 35 minutes.

## 2022-12-20 NOTE — DISCHARGE INSTRUCTIONS
Discharge Instructions    Discharged to home by car with relative. Discharged via wheelchair, hospital escort: Yes.  Special equipment needed: Not Applicable    Be sure to schedule a follow-up appointment with your primary care doctor or any specialists as instructed.     Discharge Plan:   Diet Plan: Discussed  Activity Level: Discussed  Confirmed Follow up Appointment: Appointment Scheduled  Confirmed Symptoms Management: Discussed  Medication Reconciliation Updated: Yes  Influenza Vaccine Indication: Not indicated: Previously immunized this influenza season and > 8 years of age    I understand that a diet low in cholesterol, fat, and sodium is recommended for good health. Unless I have been given specific instructions below for another diet, I accept this instruction as my diet prescription.   Other diet: Cardiac    Special Instructions: None    -Is this patient being discharged with medication to prevent blood clots?  No    Is patient discharged on Warfarin / Coumadin?   No     Dolor en el pecho, Inespecífico  (Chest Pain, Nonspecific)  Con frecuencia es difícil eden un diagnóstico específico de la causa del dolor de pecho. Siempre hay bernardo posibilidad de que donahue dolor puede estar relacionado con algo grave, noemi un ataque al corazón o un coágulo de andrae en los pulmones. Deberá hacer controles con donahue médico para bernardo evaluación adicional. Puede ser necesario realizar otros análisis de laboratorio u otros estudios tales noemi radiografías, electrocardiograma, prueba de esfuerzo, o diagnóstico por imágenes para el corazón para determinar la causa de donahue dolor.   La mayor parte del dolor en el pecho inespecífico mejorará en 2 ó 3 días de reposo y analgésicos suaves. Richie los próximos días evite los ejercicios físicos o las actividades que le causan dolor. No fume. Evite consumir alcohol. Comuníquese con donahue médico para realizar controles según las indicaciones.   SOLICITE ATENCIÓN MÉDICA DE INMEDIATO SI:  El dolor en  el pecho aumenta o se irradia hacia el brazo, el crystal, la mandíbula, la espalda o el abdomen.   Le falta el aire, aumenta la tos o comienza a escupir andrae al toser.   Siente un dolor intenso en la espalda, el abdomen, tiene náuseas o vómitos intensos.   Desarrolla bernardo debilidad extrema, se desmaya, tiene fiebre o escalofríos.   Document Released: 12/18/2006 Document Revised: 03/11/2013  ExitCare® Patient Information ©2013 [employer].      Dolor en el pecho (observación)  Generalmente no es fácil eden un diagnóstico del origen del dolor en el pecho. Probablemente la causa de bhaskar síntomas sea el suministro insuficiente de oxígeno al corazón (angina de pecho). La angina de pecho que no se trata o evalúa puede conducir a un ataque cardíaco (infarto de miocardio) o a la muerte.  Para determinar la causa posible del dolor en el pecho, le indicarán análisis de andrae, electrocardiogramas y radiografías. Luego de la evaluación y la observación, el médico davis determinado que no es probable que el dolor que usted siente sea angina de pecho. Daisy deberá someterse a bernardo evaluación más profunda. Deberá concurrir a controles con otros profesionales o pruebas diagnósticas, según las indicaciones. Es muy importante que cumpla con las visitas de control. Si no cumple con los controles, podría sufrir un daño cardíaco permanente, discapacidad o hasta la muerte. Si hay algún problema para cumplir con las visitas de control, comuníquelo a donahue médico.  CUIDADOS EN EL HOGAR   Debido a la ligera probabilidad de que el dolor que siente pueda ser angina de pecho, es importante que siga un estilo de augie saludable y cumpla con el plan de tratamiento indicado por el médico.  Mantenga un peso saludable.  Manténgase físicamente activo y miranda ejercicios regularmente.  Disminuya la cantidad de sal que consume.  Siga bernardo dieta pobre grasas saturadas y colesterol. Evite comidas fritas en aceite o preparadas con grasa. Póngase en contacto con un  nutricionista para aprender cuales son los alimentos saludables.  Aumente el consumo de fibras, e incluya en la dieta granos enteros, vegetales y frutas.  Evite situaciones que causen estrés, priscilla o depresión.  [place] los medicamentos noemi le indicó el médico. Informe al profesional si tiene algún efecto adverso. No suspenda la medicación ni ajuste las dosis por donahue cuenta.  Deje de fumar. No use parches ni goma de mascar con nicotina hasta consultar al médico.  Mantenga la presión arterial, los niveles de glucosa y colesterol dentro de límites normales.  Limite el consumo de alcohol a no más de 1 medida por día si es cora y no está embarazada y 2 medidas si es hombre.  Evite el consumo de drogas.  SOLICITE ATENCIÓN MÉDICA SI:  Siente dolor intenso u opresión en el pecho además de otros síntomas noemi:  Dolor o presión en los brazos, espalda, mandíbula o crystal.  Transpira mucho.  Tiene ganas de vomitar (náuseas ).  Le falta el aire estando en reposo.  Tiene latidos cardíacos irregulares o rápidos.  Siente dolor en el pecho que no mejora luego de hacer reposo o después de roscoe los medicamentos habituales.  Se despierta con dolor en el pecho.  Se siente mareado, se desmaya o siente fatiga extrema.  Observa que aumenta donahue dificultad para respirar irma el descanso, el sueño o cualquier actividad.  No puede dormir porque no puede respirar.  Tiene tos frecuente o escupe andrae.  Siente un dolor intenso en la espalda o en el abdomen, tiene náuseas y vomita.  Presenta debilidad excesiva, mareos, lipotimia o escalofríos.  Cualquiera de estos síntomas puede ser un problema grave que constituye bernardo emergencia. No espere para emiliano si los síntomas desaparecen. Comuníquese con el servicio de emergencias de donahue localidad (911 en los [place]). Nomaneje usted hasta el hospital.  ASEGÚRESE DE QUE:  Comprende estas instrucciones.  Controlará donahue enfermedad.  Solicitará ayuda de inmediato si no mejora o empeora.  Document  Released: 04/03/2012 Document Revised: 03/11/2013  [employer]® Patient Information ©2014 [employer].    Electrocardiograma de esfuerzo farmacológico  (Pharmacologic Stress Electrocardiogram)  Un electrocardiograma de esfuerzo farmacológico es un estudio del corazón (cardíaco) que usa imágenes de resonancia magnética nuclear para evaluar el suministro de andrae que recibe el corazón. [place] estudio también se puede llamar electrocardiografía de esfuerzo farmacológico. Farmacológico significa que se usa un medicamento para aumentar la frecuencia cardíaca y la presión arterial.   Esta prueba de esfuerzo se realiza para encontrar áreas en donde el flujo sanguíneo hacia el corazón es deficiente al determinar la magnitud de la enfermedad arterial coronaria (EAC). Algunas personas se ejercitan en la cinta caminadora, la cual aumenta naturalmente el flujo sanguíneo al corazón. En el lien de las personas que no pueden ejercitarse en la cinta caminadora, se usan medicamentos. Estos medicamentos estimulan el corazón y harán que aumente la frecuencia y la intensidad cardíaca, noemi si estuviera realizando ejercicios.   Los estudios de esfuerzo farmacológico pueden ayudar a determinar lo siguiente:  La eficacia del suministro de andrae hacia el corazón irma la realización de bernardo actividad intensa para que puedan darle el meliza.  La magnitud del bloqueo de la arteria coronaria provocado por la enfermedad arterial coronaria.  Donahue pronóstico, en el lien de que haya sufrido un ataque cardíaco.  La efectividad de los procedimientos cardíacos realizados, noemi bernardo angioplastia, que pueden aumentar la circulación en las arterias coronarias.  Las causas del dolor o la presión en el pecho.  INFORME A DONAHUE MÉDICO:  Cualquier alergia que tenga.  Todos los medicamentos que utiliza, incluyendo vitaminas, hierbas, gotas oftálmicas, cremas y medicamentos de venta candelaria.  Problemas previos que usted o los miembros de donahue bertha hayan tenido con el  uso de anestésicos.  Enfermedades de la andrae.  Cirugías previas.  Enfermedades patológicas.  Posibilidad de embarazo, si corresponde.  Si está actualmente amamantando.  RIESGOS Y COMPLICACIONES  En general, se trata de un procedimiento seguro. Sin embargo, noemi en cualquier procedimiento, pueden surgir complicaciones. Las complicaciones posibles son:  Siente dolor o presión en:  El pecho.  La mandíbula o el crystal.  Entre los omóplatos.  El dolor se extiende hacia el brazo cody.  Dolor de sergey.  Mareos o sensación de desvanecimiento.  Falta de aire.  Latidos cardíacos rápidos o irregulares.  Presión arterial baja.  Náuseas o vómitos.  Rubor.  Enrojecimiento que sube por el brazo y dolor leve irma la inyección del medicamento.  Ataque cardíaco (poco frecuente).  ANTES DEL PROCEDIMIENTO   Evite todo tipo de producto con cafeína irma las 24 horas previas al estudio o según las indicaciones del médico. [place] incluye café, té (incluso el té descafeinado), gaseosas con cafeína, chocolate, cacao y ciertos analgésicos.  Siga las instrucciones del médico respecto de lo que debe comer y beber antes del estudio.  [place] los medicamentos con agua en las horas habituales, según las indicaciones, kelby que le indiquen lo contrario. Entre las excepciones se incluyen las siguientes:  Si tiene diabetes, pregunte cómo debe administrarse la insulina o roscoe los comprimidos. Es habitual que se ajusten las dosis de insulina en la mañana del estudio.  Si yanet betabloqueantes, es importante que hable con el médico sobre estos medicamentos antes de la fecha del estudio. Los betabloqueantes pueden interferir en los resultados el estudio. En algunos casos, deben cambiar o suspender la dosis de estos medicamentos 24 horas o más antes del estudio.  Si usa un parche de nitroglicerina, posiblemente se lo deba quitar antes del estudio. Pregúntele al médico si debe quitarse el parche antes del estudio.  Si sufre alguna enfermedad  respiratoria y usa un inhalador, tráigalo en el momento del estudio.  Si es un paciente ambulatorio, tenga en cuenta que después de realizar la fase de esfuerzo (ejercicio) del estudio, deberá comer algo, por lo que recomendamos traer algún bocadillo.  No fume irma las 4 horas previas al estudio o según las indicaciones del médico.  No se aplique lociones, polvos, cremas ni aceites en el pecho antes del estudio.  Use ropa y calzado cómodos.  Comuníquele al médico si no pudo completar o cumplir con los preparativos para el estudio.  PROCEDIMIENTO   Le colocarán varios parches (electrodos) en el pecho. Si es necesario, es posible que le afeiten pequeñas zonas en el pecho para que los electrodos se adhieran mejor. Bernardo vez que los electrodos estén colocados en el cuerpo, conectarán varios cables a los electrodos y controlarán donahue frecuencia cardíaca.  Se le colocará bernardo vía intravenosa. Se administra un rastreador nuclear (isótopo), que se puede administrar por vía intravenosa u oral. Nuclear hace referencia a los varios tipos de isótopos radiactivos; el isótopo nuclear ilumina las arterias para que las imágenes nucleares kadi claras. El isótopo es absorbido por el organismo. [place] ocurre ante bernardo baja exposición a la radiación.  Se yanet bernardo imagen nuclear en reposo que muestre cómo funciona el corazón en reposo.  Se administra un medicamento a través de bernardo vía intravenosa (IV).  Aproximadamente 1 hora después de la administración de [place] medicamento se genera bernardo segunda imagen y se determina cómo funciona el corazón bajo esfuerzo.  Irma esta fase de esfuerzo, estará conectado a bernardo máquina de electrocardiograma. Se controlará la presión arterial y los niveles de oxígeno.  DESPUÉS DEL PROCEDIMIENTO   Después del estudio, se le controlará la frecuencia cardíaca y la presión arterial.  Puede retomar odnahue rutina normal, incluidos la dieta, las actividades y los medicamentos, a menos que donahue médico le indique lo  contrario.     Esta información no tiene noemi fin reemplazar el consejo del médico. Asegúrese de hacerle al médico cualquier pregunta que tenga.     Document Released: 10/08/2014 Document Revised: 12/23/2014  Elsevier Interactive Patient Education ©2016 [employer]vier Inc.

## 2022-12-20 NOTE — PROGRESS NOTES
4 Eyes Skin Assessment Completed by Abimbola RN and TRAVIS Diaz.    Head WDL  Ears WDL  Nose WDL  Mouth WDL  Neck WDL  Breast/Chest WDL  Shoulder Blades WDL  Spine WDL  (R) Arm/Elbow/Hand WDL  (L) Arm/Elbow/Hand : fistula  Abdomen Scar from renal transplant   Groin WDL  Scrotum/Coccyx/Buttocks WDL  (R) Leg WDL  (L) Leg WDL  (R) Heel/Foot/Toe WDL  (L) Heel/Foot/Toe WDL          Devices In Places Tele Box, Blood Pressure Cuff, and Pulse Ox      Interventions In Place Pillows and Pressure Redistribution Mattress    Possible Skin Injury No    Pictures Uploaded Into Epic N/A  Wound Consult Placed N/A  RN Wound Prevention Protocol Ordered No

## 2022-12-20 NOTE — PROGRESS NOTES
Monitor Summary:    Rhythm:  SB   Rate Range:  47-51; low of 42  Ectopy: O PAC, R PAC    Measurements:  0.16/0.08/0.44

## 2022-12-21 ENCOUNTER — PATIENT OUTREACH (OUTPATIENT)
Dept: MEDICAL GROUP | Facility: MEDICAL CENTER | Age: 72
End: 2022-12-21
Payer: MEDICARE

## 2022-12-22 ENCOUNTER — HOSPITAL ENCOUNTER (OUTPATIENT)
Dept: LAB | Facility: MEDICAL CENTER | Age: 72
End: 2022-12-22
Attending: INTERNAL MEDICINE
Payer: MEDICARE

## 2022-12-22 ENCOUNTER — OFFICE VISIT (OUTPATIENT)
Dept: MEDICAL GROUP | Facility: MEDICAL CENTER | Age: 72
End: 2022-12-22
Payer: MEDICARE

## 2022-12-22 VITALS
TEMPERATURE: 96.9 F | OXYGEN SATURATION: 100 % | HEIGHT: 64 IN | HEART RATE: 60 BPM | BODY MASS INDEX: 21.17 KG/M2 | SYSTOLIC BLOOD PRESSURE: 122 MMHG | WEIGHT: 124 LBS | DIASTOLIC BLOOD PRESSURE: 60 MMHG

## 2022-12-22 DIAGNOSIS — K21.9 GASTROESOPHAGEAL REFLUX DISEASE, UNSPECIFIED WHETHER ESOPHAGITIS PRESENT: ICD-10-CM

## 2022-12-22 DIAGNOSIS — Z09 HOSPITAL DISCHARGE FOLLOW-UP: ICD-10-CM

## 2022-12-22 LAB
ANION GAP SERPL CALC-SCNC: 10 MMOL/L (ref 7–16)
BASOPHILS # BLD AUTO: 0.3 % (ref 0–1.8)
BASOPHILS # BLD: 0.01 K/UL (ref 0–0.12)
BUN SERPL-MCNC: 24 MG/DL (ref 8–22)
CALCIUM SERPL-MCNC: 9.6 MG/DL (ref 8.4–10.2)
CHLORIDE SERPL-SCNC: 111 MMOL/L (ref 96–112)
CO2 SERPL-SCNC: 19 MMOL/L (ref 20–33)
CREAT SERPL-MCNC: 1.09 MG/DL (ref 0.5–1.4)
EOSINOPHIL # BLD AUTO: 0.01 K/UL (ref 0–0.51)
EOSINOPHIL NFR BLD: 0.3 % (ref 0–6.9)
ERYTHROCYTE [DISTWIDTH] IN BLOOD BY AUTOMATED COUNT: 58.4 FL (ref 35.9–50)
FASTING STATUS PATIENT QL REPORTED: NORMAL
GFR SERPLBLD CREATININE-BSD FMLA CKD-EPI: 54 ML/MIN/1.73 M 2
GLUCOSE SERPL-MCNC: 93 MG/DL (ref 65–99)
HCT VFR BLD AUTO: 29.9 % (ref 37–47)
HGB BLD-MCNC: 9.2 G/DL (ref 12–16)
IMM GRANULOCYTES # BLD AUTO: 0.02 K/UL (ref 0–0.11)
IMM GRANULOCYTES NFR BLD AUTO: 0.5 % (ref 0–0.9)
LYMPHOCYTES # BLD AUTO: 0.63 K/UL (ref 1–4.8)
LYMPHOCYTES NFR BLD: 17.3 % (ref 22–41)
MCH RBC QN AUTO: 29.9 PG (ref 27–33)
MCHC RBC AUTO-ENTMCNC: 30.8 G/DL (ref 33.6–35)
MCV RBC AUTO: 97.1 FL (ref 81.4–97.8)
MONOCYTES # BLD AUTO: 0.17 K/UL (ref 0–0.85)
MONOCYTES NFR BLD AUTO: 4.7 % (ref 0–13.4)
NEUTROPHILS # BLD AUTO: 2.81 K/UL (ref 2–7.15)
NEUTROPHILS NFR BLD: 76.9 % (ref 44–72)
NRBC # BLD AUTO: 0 K/UL
NRBC BLD-RTO: 0 /100 WBC
PHOSPHATE SERPL-MCNC: 2.1 MG/DL (ref 2.5–4.5)
PLATELET # BLD AUTO: 136 K/UL (ref 164–446)
PMV BLD AUTO: 11.2 FL (ref 9–12.9)
POTASSIUM SERPL-SCNC: 4.6 MMOL/L (ref 3.6–5.5)
RBC # BLD AUTO: 3.08 M/UL (ref 4.2–5.4)
SODIUM SERPL-SCNC: 140 MMOL/L (ref 135–145)
WBC # BLD AUTO: 3.7 K/UL (ref 4.8–10.8)

## 2022-12-22 PROCEDURE — 84100 ASSAY OF PHOSPHORUS: CPT

## 2022-12-22 PROCEDURE — 80197 ASSAY OF TACROLIMUS: CPT

## 2022-12-22 PROCEDURE — 99213 OFFICE O/P EST LOW 20 MIN: CPT | Performed by: NURSE PRACTITIONER

## 2022-12-22 PROCEDURE — 36415 COLL VENOUS BLD VENIPUNCTURE: CPT

## 2022-12-22 PROCEDURE — 80048 BASIC METABOLIC PNL TOTAL CA: CPT

## 2022-12-22 PROCEDURE — 85025 COMPLETE CBC W/AUTO DIFF WBC: CPT

## 2022-12-22 ASSESSMENT — FIBROSIS 4 INDEX: FIB4 SCORE: 2.68

## 2022-12-22 NOTE — ASSESSMENT & PLAN NOTE
Admitted 12/19-12/20 for chest pain. Cardiac work up unremarkable. Thought to be GERD, prescribed omeprazole 20 mg daily, she has not yet picked this up from the pharmacy but will today and start. She has not had any symptoms since discharge.

## 2022-12-23 LAB — TACROLIMUS BLD-MCNC: 21.3 NG/ML

## 2022-12-26 NOTE — PROGRESS NOTES
Subjective:   Tere Gallegos is a 72 y.o. female here today for hospital discharge follow up    Hospital discharge follow-up  Admitted 12/19-12/20 for chest pain. Cardiac work up unremarkable. Thought to be GERD, prescribed omeprazole 20 mg daily, she has not yet picked this up from the pharmacy but will today and start. She has not had any symptoms since discharge.        Current medicines (including changes today)  Current Outpatient Medications   Medication Sig Dispense Refill    amiodarone (CORDARONE) 200 MG Tab Take 1 Tablet by mouth 2 times a day for 7 days. 14 Tablet 0    magnesium oxide 400 (240 Mg) MG Tab Take 1 Tablet by mouth every day. 10 Tablet 0    omeprazole (PRILOSEC) 20 MG delayed-release capsule Take 1 Capsule by mouth every day. 30 Capsule 0    docusate sodium (COLACE) 100 MG Cap Take 100-200 mg by mouth 2 times a day as needed for Constipation. Indications: Constipation      glucose blood (TRUE METRIX BLOOD GLUCOSE TEST) strip 1 Strip by Other route 3 times a day with meals. 300 Strip 3    Lancets Use one Freestyle Pearl lancet to test blood sugar once daily . 300 Each 3    carvedilol (COREG) 6.25 MG Tab Take 6.25 mg by mouth 2 times a day.      sodium bicarbonate (SODIUM BICARBONATE) 650 MG Tab Take 2 Tablets by mouth in the morning, at noon, and at bedtime. 120 Tablet 3    furosemide (LASIX) 40 MG Tab Take 1 Tablet by mouth every day. 30 Tablet 3    losartan (COZAAR) 25 MG Tab Take 1 Tablet by mouth every day. 30 Tablet 3    valGANciclovir (VALCYTE) 450 MG tablet Take 1 Tablet by mouth every day.      fluticasone (FLONASE) 50 MCG/ACT nasal spray ADMINISTER 1 SPRAY INTO AFFECTED NOSTRIL(S) EVERY DAY. 16 mL 1    insulin glargine 100 UNIT/ML SC SOLN Inject 8 Units under the skin every day.      insulin aspart (NOVOLOG FLEXPEN) 100 UNIT/ML injection PEN Inject 2-8 Units under the skin 3 times a day before meals. Sliding Scale      HYDROcodone-acetaminophen (NORCO) 5-325 MG Tab per tablet  Take 1 Tablet by mouth every 6 hours as needed. Indications: Pain      acetaminophen (TYLENOL) 500 MG Tab Take 500-1,000 mg by mouth every 6 hours as needed. Indications: Pain      levothyroxine (SYNTHROID) 50 MCG Tab Take 50 mcg by mouth every morning on an empty stomach.      predniSONE (DELTASONE) 5 MG Tab Take 2 Tablets by mouth every day.      pantoprazole (PROTONIX) 40 MG Tablet Delayed Response Take 1 Tablet by mouth every day.      atorvastatin (LIPITOR) 20 MG Tab Take 1 Tablet by mouth every evening.      clotrimazole (MYCELEX) 10 MG Thanh thanh Take 1 Thanh by mouth 4 times a day.      mycophenolate (CELLCEPT) 500 MG tablet Take 2 Tablets by mouth 2 times a day.      sulfamethoxazole-trimethoprim (BACTRIM) 400-80 MG Tab Take 1 Tablet by mouth every day. Pt started on 11/1/2022 for 3 month course, per pts son      tacrolimus (PROGRAF) 1 MG Cap Take 1 Capsule by mouth 2 times a day.      aspirin EC (ECOTRIN) 81 MG Tablet Delayed Response Take 1 Tablet by mouth every day.      apixaban (ELIQUIS) 2.5mg Tab Take 1 Tablet by mouth 2 times a day. Indications: Thromboembolism secondary to Atrial Fibrillation 180 Tablet 3     No current facility-administered medications for this visit.     She  has a past medical history of Acquired hypothyroidism (05/04/2020), CAD (coronary artery disease), Chronic diastolic heart failure ([facility]) (05/04/2020), Coronary artery disease due to lipid rich plaque, Dental disorder, Diabetes ([facility]), ESRD (end stage renal disease) on dialysis ([facility]) (05/04/2020), Hemodialysis patient ([facility]), Hyperlipidemia, Hypertension, Kidney transplant candidate, Kidney transplant recipient (10/31/2022), Presence of drug-eluting stent in right coronary artery, QT prolongation (01/22/2020), RLS (restless legs syndrome) (08/05/2016), and Transaminitis (12/22/2018).    ROS   No chest pain, no shortness of breath, no abdominal pain  Positive ROS as per HPI.  All other systems reviewed and are negative.      "Objective:     /60 (BP Location: Right arm, Patient Position: Sitting, BP Cuff Size: Adult)   Pulse 60   Temp 36.1 °C (96.9 °F) (Temporal)   Ht 1.626 m (5' 4\")   Wt 56.2 kg (124 lb)   SpO2 100%  Body mass index is 21.28 kg/m².     Physical Exam:  Constitutional: Alert, no distress.  Skin: Warm, dry, good turgor, no rashes in visible areas.  Eye: Equal, round and reactive, conjunctiva clear, lids normal.  ENMT: Mask in place  Respiratory: Unlabored respiratory effort, lungs clear to auscultation, no wheezes, no ronchi.  Cardiovascular: Normal S1, S2, no murmur, no edema.  Abdomen: Soft, non-tender  Psych: Alert and oriented x3, normal affect and mood.        Assessment and Plan:   The following treatment plan was discussed    1. Hospital discharge follow-up  Stable and asymptomatic post discharge.    omeprazole and start ASAP  Strict ER precautions for worsening or return of symptoms  Has follow up scheduled with cardiology in 1 week    2. Gastroesophageal reflux disease, unspecified whether esophagitis present  Unstable   and start omeprazole ASAP        Followup: Return in about 3 months (around 3/22/2023).    The MA is performing the above orders under the direction of Dr. Priest    Please note that this dictation was created using voice recognition software. I have made every reasonable attempt to correct obvious errors, but I expect that there are errors of grammar and possibly content that I did not discover before finalizing the note.               "

## 2022-12-26 NOTE — PROGRESS NOTES
Covid-19 surge in effect, tier 3    Bedside report received from TRAVIS Schilling. Assumed pt care. Pt is AOx4. Denies any pain or other distress at this time. Discussed POC including pending discharge, fall risk/precautions. Pt verbalized understanding. Hourly rounding in place. Fall precautions in place and call light within reach.      No

## 2022-12-27 ENCOUNTER — OFFICE VISIT (OUTPATIENT)
Dept: CARDIOLOGY | Facility: MEDICAL CENTER | Age: 72
End: 2022-12-27
Payer: MEDICARE

## 2022-12-27 ENCOUNTER — HOSPITAL ENCOUNTER (OUTPATIENT)
Dept: LAB | Facility: MEDICAL CENTER | Age: 72
End: 2022-12-27
Attending: INTERNAL MEDICINE
Payer: MEDICARE

## 2022-12-27 ENCOUNTER — TELEPHONE (OUTPATIENT)
Dept: CARDIOLOGY | Facility: MEDICAL CENTER | Age: 72
End: 2022-12-27

## 2022-12-27 ENCOUNTER — APPOINTMENT (OUTPATIENT)
Dept: CARDIOLOGY | Facility: MEDICAL CENTER | Age: 72
End: 2022-12-27
Payer: MEDICARE

## 2022-12-27 VITALS
HEART RATE: 60 BPM | BODY MASS INDEX: 21.17 KG/M2 | HEIGHT: 64 IN | WEIGHT: 124 LBS | RESPIRATION RATE: 16 BRPM | OXYGEN SATURATION: 97 % | DIASTOLIC BLOOD PRESSURE: 60 MMHG | SYSTOLIC BLOOD PRESSURE: 160 MMHG

## 2022-12-27 DIAGNOSIS — E11.22 CONTROLLED TYPE 2 DIABETES MELLITUS WITH CHRONIC KIDNEY DISEASE ON CHRONIC DIALYSIS, WITH LONG-TERM CURRENT USE OF INSULIN (HCC): ICD-10-CM

## 2022-12-27 DIAGNOSIS — E78.2 MIXED HYPERLIPIDEMIA: ICD-10-CM

## 2022-12-27 DIAGNOSIS — I48.0 PAROXYSMAL A-FIB (HCC): ICD-10-CM

## 2022-12-27 DIAGNOSIS — N18.6 CONTROLLED TYPE 2 DIABETES MELLITUS WITH CHRONIC KIDNEY DISEASE ON CHRONIC DIALYSIS, WITH LONG-TERM CURRENT USE OF INSULIN (HCC): ICD-10-CM

## 2022-12-27 DIAGNOSIS — I48.0 PAROXYSMAL ATRIAL FIBRILLATION (HCC): ICD-10-CM

## 2022-12-27 DIAGNOSIS — I15.0 RENOVASCULAR HYPERTENSION: ICD-10-CM

## 2022-12-27 DIAGNOSIS — I25.111 CORONARY ARTERY DISEASE INVOLVING NATIVE CORONARY ARTERY OF NATIVE HEART WITH ANGINA PECTORIS WITH DOCUMENTED SPASM (HCC): ICD-10-CM

## 2022-12-27 DIAGNOSIS — E78.5 DYSLIPIDEMIA: ICD-10-CM

## 2022-12-27 DIAGNOSIS — Z79.4 LONG-TERM INSULIN USE (HCC): ICD-10-CM

## 2022-12-27 DIAGNOSIS — Z94.0 KIDNEY TRANSPLANT RECIPIENT: ICD-10-CM

## 2022-12-27 DIAGNOSIS — Z99.2 CONTROLLED TYPE 2 DIABETES MELLITUS WITH CHRONIC KIDNEY DISEASE ON CHRONIC DIALYSIS, WITH LONG-TERM CURRENT USE OF INSULIN (HCC): ICD-10-CM

## 2022-12-27 DIAGNOSIS — I50.31 ACUTE DIASTOLIC CHF (CONGESTIVE HEART FAILURE) (HCC): ICD-10-CM

## 2022-12-27 DIAGNOSIS — Z79.01 CHRONIC ANTICOAGULATION: ICD-10-CM

## 2022-12-27 DIAGNOSIS — Z79.4 CONTROLLED TYPE 2 DIABETES MELLITUS WITH CHRONIC KIDNEY DISEASE ON CHRONIC DIALYSIS, WITH LONG-TERM CURRENT USE OF INSULIN (HCC): ICD-10-CM

## 2022-12-27 PROBLEM — R07.9 PAIN IN THE CHEST: Status: RESOLVED | Noted: 2021-06-16 | Resolved: 2022-12-27

## 2022-12-27 PROBLEM — H91.91 DECREASED HEARING OF RIGHT EAR: Status: RESOLVED | Noted: 2022-09-28 | Resolved: 2022-12-27

## 2022-12-27 PROBLEM — D70.9 NEUTROPENIA (HCC): Status: RESOLVED | Noted: 2022-08-25 | Resolved: 2022-12-27

## 2022-12-27 PROBLEM — E87.1 HYPONATREMIA: Status: RESOLVED | Noted: 2022-08-25 | Resolved: 2022-12-27

## 2022-12-27 PROBLEM — E03.8 SUBCLINICAL HYPOTHYROIDISM: Status: RESOLVED | Noted: 2020-07-07 | Resolved: 2022-12-27

## 2022-12-27 PROBLEM — E11.9 TYPE 2 DIABETES MELLITUS (HCC): Status: RESOLVED | Noted: 2022-12-19 | Resolved: 2022-12-27

## 2022-12-27 PROBLEM — Z09 HOSPITAL DISCHARGE FOLLOW-UP: Status: RESOLVED | Noted: 2021-08-18 | Resolved: 2022-12-27

## 2022-12-27 PROBLEM — Z79.84 LONG TERM (CURRENT) USE OF ORAL HYPOGLYCEMIC DRUGS: Status: RESOLVED | Noted: 2020-07-07 | Resolved: 2022-12-27

## 2022-12-27 PROBLEM — R94.31 QT PROLONGATION: Status: RESOLVED | Noted: 2020-01-22 | Resolved: 2022-12-27

## 2022-12-27 PROBLEM — I50.32 CHRONIC HEART FAILURE WITH PRESERVED EJECTION FRACTION (HFPEF) (HCC): Status: RESOLVED | Noted: 2020-05-04 | Resolved: 2022-12-27

## 2022-12-27 PROBLEM — R10.30 LOWER ABDOMINAL PAIN: Status: RESOLVED | Noted: 2022-12-08 | Resolved: 2022-12-27

## 2022-12-27 PROBLEM — I48.91 ATRIAL FIBRILLATION WITH RVR (HCC): Status: RESOLVED | Noted: 2022-11-27 | Resolved: 2022-12-27

## 2022-12-27 PROBLEM — R63.0 POOR APPETITE: Status: RESOLVED | Noted: 2022-09-28 | Resolved: 2022-12-27

## 2022-12-27 PROBLEM — G89.29 CHRONIC PAIN OF RIGHT KNEE: Status: RESOLVED | Noted: 2021-07-08 | Resolved: 2022-12-27

## 2022-12-27 PROBLEM — D63.8 ANEMIA, CHRONIC DISEASE: Status: RESOLVED | Noted: 2022-01-25 | Resolved: 2022-12-27

## 2022-12-27 PROBLEM — R34 ANURIA: Status: RESOLVED | Noted: 2020-09-16 | Resolved: 2022-12-27

## 2022-12-27 PROBLEM — R07.89 ATYPICAL CHEST PAIN: Status: RESOLVED | Noted: 2022-12-19 | Resolved: 2022-12-27

## 2022-12-27 PROBLEM — I10 ESSENTIAL HYPERTENSION: Status: RESOLVED | Noted: 2022-12-19 | Resolved: 2022-12-27

## 2022-12-27 PROBLEM — D68.69 SECONDARY HYPERCOAGULABLE STATE (HCC): Status: RESOLVED | Noted: 2022-04-04 | Resolved: 2022-12-27

## 2022-12-27 PROBLEM — M25.561 CHRONIC PAIN OF RIGHT KNEE: Status: RESOLVED | Noted: 2021-07-08 | Resolved: 2022-12-27

## 2022-12-27 PROBLEM — D72.819 LEUKOPENIA: Status: RESOLVED | Noted: 2021-08-11 | Resolved: 2022-12-27

## 2022-12-27 PROBLEM — D69.6 THROMBOCYTOPENIA (HCC): Status: RESOLVED | Noted: 2022-08-25 | Resolved: 2022-12-27

## 2022-12-27 PROBLEM — I21.4 NSTEMI (NON-ST ELEVATED MYOCARDIAL INFARCTION) (HCC): Status: RESOLVED | Noted: 2022-11-27 | Resolved: 2022-12-27

## 2022-12-27 PROBLEM — D61.818 PANCYTOPENIA (HCC): Status: RESOLVED | Noted: 2020-11-18 | Resolved: 2022-12-27

## 2022-12-27 PROBLEM — E83.42 HYPOMAGNESEMIA: Status: RESOLVED | Noted: 2022-12-19 | Resolved: 2022-12-27

## 2022-12-27 PROBLEM — R20.2 NUMBNESS AND TINGLING IN BOTH HANDS: Status: RESOLVED | Noted: 2020-09-17 | Resolved: 2022-12-27

## 2022-12-27 PROBLEM — G62.9 NEUROPATHY: Status: RESOLVED | Noted: 2022-09-02 | Resolved: 2022-12-27

## 2022-12-27 PROBLEM — R20.0 NUMBNESS AND TINGLING IN BOTH HANDS: Status: RESOLVED | Noted: 2020-09-17 | Resolved: 2022-12-27

## 2022-12-27 LAB
ANION GAP SERPL CALC-SCNC: 8 MMOL/L (ref 7–16)
BASOPHILS # BLD AUTO: 0.5 % (ref 0–1.8)
BASOPHILS # BLD: 0.02 K/UL (ref 0–0.12)
BUN SERPL-MCNC: 20 MG/DL (ref 8–22)
CALCIUM SERPL-MCNC: 9.7 MG/DL (ref 8.4–10.2)
CHLORIDE SERPL-SCNC: 108 MMOL/L (ref 96–112)
CO2 SERPL-SCNC: 23 MMOL/L (ref 20–33)
CREAT SERPL-MCNC: 1.23 MG/DL (ref 0.5–1.4)
EOSINOPHIL # BLD AUTO: 0.01 K/UL (ref 0–0.51)
EOSINOPHIL NFR BLD: 0.3 % (ref 0–6.9)
ERYTHROCYTE [DISTWIDTH] IN BLOOD BY AUTOMATED COUNT: 55.8 FL (ref 35.9–50)
GFR SERPLBLD CREATININE-BSD FMLA CKD-EPI: 46 ML/MIN/1.73 M 2
GLUCOSE SERPL-MCNC: 85 MG/DL (ref 65–99)
HCT VFR BLD AUTO: 29.8 % (ref 37–47)
HGB BLD-MCNC: 9.3 G/DL (ref 12–16)
IMM GRANULOCYTES # BLD AUTO: 0.02 K/UL (ref 0–0.11)
IMM GRANULOCYTES NFR BLD AUTO: 0.5 % (ref 0–0.9)
LYMPHOCYTES # BLD AUTO: 0.57 K/UL (ref 1–4.8)
LYMPHOCYTES NFR BLD: 15.3 % (ref 22–41)
MCH RBC QN AUTO: 29.9 PG (ref 27–33)
MCHC RBC AUTO-ENTMCNC: 31.2 G/DL (ref 33.6–35)
MCV RBC AUTO: 95.8 FL (ref 81.4–97.8)
MONOCYTES # BLD AUTO: 0.28 K/UL (ref 0–0.85)
MONOCYTES NFR BLD AUTO: 7.5 % (ref 0–13.4)
NEUTROPHILS # BLD AUTO: 2.82 K/UL (ref 2–7.15)
NEUTROPHILS NFR BLD: 75.9 % (ref 44–72)
NRBC # BLD AUTO: 0 K/UL
NRBC BLD-RTO: 0 /100 WBC
PHOSPHATE SERPL-MCNC: 1.9 MG/DL (ref 2.5–4.5)
PLATELET # BLD AUTO: 133 K/UL (ref 164–446)
PMV BLD AUTO: 11.6 FL (ref 9–12.9)
POTASSIUM SERPL-SCNC: 4.4 MMOL/L (ref 3.6–5.5)
RBC # BLD AUTO: 3.11 M/UL (ref 4.2–5.4)
SODIUM SERPL-SCNC: 139 MMOL/L (ref 135–145)
WBC # BLD AUTO: 3.7 K/UL (ref 4.8–10.8)

## 2022-12-27 PROCEDURE — 36415 COLL VENOUS BLD VENIPUNCTURE: CPT

## 2022-12-27 PROCEDURE — 85025 COMPLETE CBC W/AUTO DIFF WBC: CPT

## 2022-12-27 PROCEDURE — 80197 ASSAY OF TACROLIMUS: CPT

## 2022-12-27 PROCEDURE — 84100 ASSAY OF PHOSPHORUS: CPT

## 2022-12-27 PROCEDURE — 99214 OFFICE O/P EST MOD 30 MIN: CPT | Performed by: NURSE PRACTITIONER

## 2022-12-27 PROCEDURE — 80048 BASIC METABOLIC PNL TOTAL CA: CPT

## 2022-12-27 RX ORDER — CARVEDILOL 6.25 MG/1
6.25 TABLET ORAL 2 TIMES DAILY
Qty: 200 TABLET | Refills: 3 | Status: ON HOLD | OUTPATIENT
Start: 2022-12-27 | End: 2023-02-20

## 2022-12-27 RX ORDER — ATORVASTATIN CALCIUM 20 MG/1
20 TABLET, FILM COATED ORAL NIGHTLY
Qty: 100 TABLET | Refills: 3 | Status: SHIPPED | OUTPATIENT
Start: 2022-12-27 | End: 2024-01-22

## 2022-12-27 RX ORDER — FUROSEMIDE 40 MG/1
TABLET ORAL
COMMUNITY
Start: 2022-12-27 | End: 2022-12-27

## 2022-12-27 RX ORDER — LOSARTAN POTASSIUM 50 MG/1
50 TABLET ORAL EVERY EVENING
Qty: 100 TABLET | Refills: 3 | Status: SHIPPED | OUTPATIENT
Start: 2022-12-27 | End: 2023-01-18 | Stop reason: SDUPTHER

## 2022-12-27 RX ORDER — LOSARTAN POTASSIUM 25 MG/1
TABLET ORAL
COMMUNITY
Start: 2022-12-27 | End: 2022-12-27

## 2022-12-27 RX ORDER — FUROSEMIDE 20 MG/1
20 TABLET ORAL
Qty: 30 TABLET | Refills: 11 | Status: SHIPPED | OUTPATIENT
Start: 2022-12-27 | End: 2023-02-15 | Stop reason: SDUPTHER

## 2022-12-27 ASSESSMENT — ENCOUNTER SYMPTOMS
FEVER: 0
SHORTNESS OF BREATH: 0
PND: 0
ORTHOPNEA: 0
ABDOMINAL PAIN: 0
CLAUDICATION: 0
MYALGIAS: 0
PALPITATIONS: 0
COUGH: 0

## 2022-12-27 ASSESSMENT — FIBROSIS 4 INDEX: FIB4 SCORE: 2.74

## 2022-12-27 NOTE — TELEPHONE ENCOUNTER
Spoke with patient's  regarding her appointments today patient had a 3:15 with SC and 4:00 with VICTORIA. Patient was giving the option to come either appointments.. Patient confirmed  that he will keep  the 3:15.. I had also confirmed with SAMMI Clay and advised me that he did not had to come to both appointments. Patient understood.

## 2022-12-27 NOTE — PATIENT INSTRUCTIONS
Check BP once a day until stable, then can spot check and only check when feels poorly.    -140/60-90    Changes made today:    STOP amiodarone after 2 weeks of taking this medication-heart rhythm medication.  Re-START carvedilol at 6.25 mg twice a day-heart rhythm medication and blood pressure medication.  STOP furosemide and take as needed for leg swelling.  INCREASE losartan to 50 mg once in the evening- blood pressure medication.  STOP aspirin-platelet inhibitor medication.  CONTINUE eliquis 2.5 mg twice a day-blood thinner medication.  CONTINUE atorvastatin 20 mg once in the evening-cholesterol lowering medication.    I have sent all the prescriptions to her pharmacy for a year.    I will order a heart monitor to check for abnormal heart rhythm and labs to be done in 1 month.    You remain anemic, please check in with your kidney doctor and primary doctor.

## 2022-12-28 ASSESSMENT — ENCOUNTER SYMPTOMS: DIZZINESS: 0

## 2022-12-28 NOTE — PROGRESS NOTES
Chief Complaint   Patient presents with    Chest Pain     F/V Dx: Chest pain, unspecified type     Subjective     Tere Kenyetta Gallegos is a 72 y.o. female who presents today for hospital follow up.    She is a patient of Leidy CHAPA and Dr. Hernandez in our office. Hx of HLD with CAD with prior PCI, chronic anemia, IDDM with renal transplant with prior ESRD, HTN, PAF on chronic anticoagulation, and diastolic heart failure.    She presents today with her daughter and grand daughter (who is translating for her due to  services being unavailable).    She states she has generalized fatigue and is confused about which medications she is taking.    She has no other cardiac symptoms to report.    She has no chest pain, shortness of breath, edema, dizziness/lightheadedness, or palpitations.    Past Medical History:   Diagnosis Date    Acquired hypothyroidism 2020    CAD (coronary artery disease)     Chronic diastolic heart failure (H[facility]) 2020    Coronary artery disease due to lipid rich plaque     2 Synergy NOEMI to 100% RCA stent placed    Dental disorder     partial dentures- uppers    Diabetes ([facility])     oral medication    ESRD (end stage renal disease) on dialysis ([facility]) 2020    Hemodialysis patient ([facility])     M, W, F    Hyperlipidemia     Hypertension     Kidney transplant candidate     Kidney transplant recipient 10/31/2022    Presence of drug-eluting stent in right coronary artery     QT prolongation 2020    RLS (restless legs syndrome) 2016    Transaminitis 2018     Past Surgical History:   Procedure Laterality Date    OTHER ABDOMINAL SURGERY Right 10/31/2022     Donor kidney transplant - [facility] CARDIAC CATH  2016    RCA stented with 2 Synergy drug-eluting stents.    RECOVERY  2016    Procedure: CATH LAB [facility] WITH POSSIBLE DR. CASTILLO;  Surgeon: Recoveryonly Surgery;  Location: SURGERY PRE-POST PROC UNIT [facility] [place];  Service:      [facility] CARDIAC CATH  08/16/2016    100% RCA    OTHER Left 2014    left arm upper extremity fistula    OTHER ABDOMINAL SURGERY      left kidney removed due to cancer     Family History   Problem Relation Age of Onset    Diabetes Sister     Other Sister         liver disease    Diabetes Brother     Heart Disease Neg Hx      Social History     Socioeconomic History    Marital status:      Spouse name: Not on file    Number of children: Not on file    Years of education: Not on file    Highest education level: Not on file   Occupational History    Not on file   Tobacco Use    Smoking status: Never    Smokeless tobacco: Never   Vaping Use    Vaping Use: Never used   Substance and Sexual Activity    Alcohol use: No     Alcohol/week: 0.0 oz    Drug use: No    Sexual activity: Never   Other Topics Concern    Not on file   Social History Narrative    Not on file     Social Determinants of Health     Financial Resource Strain: Not on file   Food Insecurity: Not on file   Transportation Needs: Not on file   Physical Activity: Not on file   Stress: Not on file   Social Connections: Not on file   Intimate Partner Violence: Not on file   Housing Stability: Not on file     No Known Allergies  Outpatient Encounter Medications as of 12/27/2022   Medication Sig Dispense Refill    carvedilol (COREG) 6.25 MG Tab Take 1 Tablet by mouth 2 times a day. 200 Tablet 3    atorvastatin (LIPITOR) 20 MG Tab Take 1 Tablet by mouth every evening. 100 Tablet 3    furosemide (LASIX) 20 MG Tab Take 1 Tablet by mouth 1 time a day as needed (leg swelling). 30 Tablet 11    losartan (COZAAR) 50 MG Tab Take 1 Tablet by mouth every evening. 100 Tablet 3    magnesium oxide 400 (240 Mg) MG Tab Take 1 Tablet by mouth every day. 10 Tablet 0    omeprazole (PRILOSEC) 20 MG delayed-release capsule Take 1 Capsule by mouth every day. 30 Capsule 0    docusate sodium (COLACE) 100 MG Cap Take 100-200 mg by mouth 2 times a day as needed for Constipation.  Indications: Constipation      glucose blood (TRUE METRIX BLOOD GLUCOSE TEST) strip 1 Strip by Other route 3 times a day with meals. 300 Strip 3    Lancets Use one Freestyle Pearl lancet to test blood sugar once daily . 300 Each 3    sodium bicarbonate (SODIUM BICARBONATE) 650 MG Tab Take 2 Tablets by mouth in the morning, at noon, and at bedtime. 120 Tablet 3    valGANciclovir (VALCYTE) 450 MG tablet Take 1 Tablet by mouth every day.      fluticasone (FLONASE) 50 MCG/ACT nasal spray ADMINISTER 1 SPRAY INTO AFFECTED NOSTRIL(S) EVERY DAY. 16 mL 1    insulin glargine 100 UNIT/ML SC SOLN Inject 8 Units under the skin every day.      insulin aspart (NOVOLOG FLEXPEN) 100 UNIT/ML injection PEN Inject 2-8 Units under the skin 3 times a day before meals. Sliding Scale      levothyroxine (SYNTHROID) 50 MCG Tab Take 50 mcg by mouth every morning on an empty stomach.      predniSONE (DELTASONE) 5 MG Tab Take 2 Tablets by mouth every day.      clotrimazole (MYCELEX) 10 MG Thanh thanh Take 1 Thanh by mouth 4 times a day.      mycophenolate (CELLCEPT) 500 MG tablet Take 2 Tablets by mouth 2 times a day.      sulfamethoxazole-trimethoprim (BACTRIM) 400-80 MG Tab Take 1 Tablet by mouth every day. Pt started on 11/1/2022 for 3 month course, per pts son      tacrolimus (PROGRAF) 1 MG Cap Take 1 Capsule by mouth 2 times a day.      apixaban (ELIQUIS) 2.5mg Tab Take 1 Tablet by mouth 2 times a day. Indications: Thromboembolism secondary to Atrial Fibrillation 180 Tablet 3    [DISCONTINUED] furosemide (LASIX) 40 MG Tab       [DISCONTINUED] losartan (COZAAR) 25 MG Tab       [DISCONTINUED] amiodarone (CORDARONE) 200 MG Tab Take 1 Tablet by mouth 2 times a day for 7 days. 14 Tablet 0    [DISCONTINUED] carvedilol (COREG) 6.25 MG Tab Take 6.25 mg by mouth 2 times a day.      [DISCONTINUED] furosemide (LASIX) 40 MG Tab Take 1 Tablet by mouth every day. 30 Tablet 3    [DISCONTINUED] losartan (COZAAR) 25 MG Tab Take 1 Tablet by mouth every  "day. 30 Tablet 3    [DISCONTINUED] HYDROcodone-acetaminophen (NORCO) 5-325 MG Tab per tablet Take 1 Tablet by mouth every 6 hours as needed. Indications: Pain (Patient not taking: Reported on 12/27/2022)      [DISCONTINUED] acetaminophen (TYLENOL) 500 MG Tab Take 500-1,000 mg by mouth every 6 hours as needed. Indications: Pain (Patient not taking: Reported on 12/27/2022)      [DISCONTINUED] pantoprazole (PROTONIX) 40 MG Tablet Delayed Response Take 1 Tablet by mouth every day. (Patient not taking: Reported on 12/27/2022)      [DISCONTINUED] atorvastatin (LIPITOR) 20 MG Tab Take 1 Tablet by mouth every evening.      [DISCONTINUED] aspirin EC (ECOTRIN) 81 MG Tablet Delayed Response Take 1 Tablet by mouth every day.       No facility-administered encounter medications on file as of 12/27/2022.     Review of Systems   Constitutional:  Positive for malaise/fatigue. Negative for fever.        Generalized fatigue    Respiratory:  Negative for cough and shortness of breath.    Cardiovascular:  Negative for chest pain, palpitations, orthopnea, claudication, leg swelling and PND.   Gastrointestinal:  Negative for abdominal pain.   Musculoskeletal:  Negative for myalgias.   Neurological:  Negative for dizziness.            Objective     BP (!) 160/60 (BP Location: Right arm, Patient Position: Sitting, BP Cuff Size: Adult)   Pulse 60   Resp 16   Ht 1.626 m (5' 4\")   Wt 56.2 kg (124 lb)   LMP  (LMP Unknown)   SpO2 97%   BMI 21.28 kg/m²     Physical Exam  Vitals and nursing note reviewed.   Constitutional:       Appearance: Normal appearance. She is well-developed and normal weight.   HENT:      Head: Normocephalic and atraumatic.   Neck:      Vascular: No JVD.   Cardiovascular:      Rate and Rhythm: Normal rate and regular rhythm.      Pulses: Normal pulses.      Heart sounds: Normal heart sounds.   Pulmonary:      Effort: Pulmonary effort is normal.      Breath sounds: Normal breath sounds.   Musculoskeletal:         " General: Normal range of motion.   Skin:     General: Skin is warm and dry.      Capillary Refill: Capillary refill takes less than 2 seconds.   Neurological:      General: No focal deficit present.      Mental Status: She is alert and oriented to person, place, and time. Mental status is at baseline.   Psychiatric:         Mood and Affect: Mood normal.         Behavior: Behavior normal.         Thought Content: Thought content normal.         Judgment: Judgment normal.              Assessment & Plan     1. Chronic anticoagulation        2. Dyslipidemia        3. Paroxysmal atrial fibrillation ([facility])  Holter Monitor Study      4. Acute diastolic CHF (congestive heart failure) ([facility])  furosemide (LASIX) 20 MG Tab    losartan (COZAAR) 50 MG Tab    Basic Metabolic Panel      5. Paroxysmal A-fib ([facility])        6. Kidney transplant recipient        7. Long-term insulin use ([facility])        8. Renovascular hypertension        9. Controlled type 2 diabetes mellitus with chronic kidney disease on chronic dialysis, with long-term current use of insulin ([facility])        10. Mixed hyperlipidemia        11. Coronary artery disease involving native coronary artery of native heart with angina pectoris with documented spasm ([facility])          Medical Decision Making: Today's Assessment/Status/Plan:      1. HLD with CAD with prior PCI  -no angina or DONALDSON  -cont statin, OK to stop ASA with eliquis monotherapy  -follow labs annually, LDL goal <70    2. PAF on chronic anticoagulation  -no recurrence per patient  -cont eliquis 2.5 mg BID, renal dosing (consider up titration with renal transplant now?)  -cont coreg for rate control, make sure she is taking 6.25 mg BID  -STOP amiodarone after 2 week course, consider amiodarone 200 mg QD if afib recurs  -check heart monitor for 14 days to follow afib burden    3. DM type II with insulin use and CKD  -managed by nephrology  -labs continue to show persistent anemia, follow with PCP or nephrology    4.  Diastolic heart failure  -stable post renal transplant  -recommend use lasix PRN for edema  -cont coreg, losartan increase to 50 mg QPM  -follow symptoms  -EF at 60%    5. HTN  -uncontrolled due to not taking all her BP medications  -re-start coreg at 6.25 mg BID  -increase losartan to 50 mg QPM  -use lasix PRN  -BP goal <130/80  -follow with BP log and check in within 2 weeks with log  -check BMP with losartan increase in 2-4 weeks    Patient is to follow up with Leidy CHAPA or Dr. Hernandez in 3 months with review of BP log.

## 2022-12-29 ENCOUNTER — HOSPITAL ENCOUNTER (OUTPATIENT)
Dept: LAB | Facility: MEDICAL CENTER | Age: 72
End: 2022-12-29
Attending: INTERNAL MEDICINE
Payer: MEDICARE

## 2022-12-29 LAB
ANION GAP SERPL CALC-SCNC: 9 MMOL/L (ref 7–16)
BASOPHILS # BLD AUTO: 0.9 % (ref 0–1.8)
BASOPHILS # BLD: 0.03 K/UL (ref 0–0.12)
BUN SERPL-MCNC: 21 MG/DL (ref 8–22)
CALCIUM SERPL-MCNC: 9.5 MG/DL (ref 8.4–10.2)
CHLORIDE SERPL-SCNC: 107 MMOL/L (ref 96–112)
CO2 SERPL-SCNC: 23 MMOL/L (ref 20–33)
CREAT SERPL-MCNC: 1.02 MG/DL (ref 0.5–1.4)
EOSINOPHIL # BLD AUTO: 0.02 K/UL (ref 0–0.51)
EOSINOPHIL NFR BLD: 0.6 % (ref 0–6.9)
ERYTHROCYTE [DISTWIDTH] IN BLOOD BY AUTOMATED COUNT: 58.2 FL (ref 35.9–50)
GFR SERPLBLD CREATININE-BSD FMLA CKD-EPI: 58 ML/MIN/1.73 M 2
GLUCOSE SERPL-MCNC: 78 MG/DL (ref 65–99)
HCT VFR BLD AUTO: 31.9 % (ref 37–47)
HGB BLD-MCNC: 9.8 G/DL (ref 12–16)
IMM GRANULOCYTES # BLD AUTO: 0.01 K/UL (ref 0–0.11)
IMM GRANULOCYTES NFR BLD AUTO: 0.3 % (ref 0–0.9)
LYMPHOCYTES # BLD AUTO: 0.6 K/UL (ref 1–4.8)
LYMPHOCYTES NFR BLD: 19 % (ref 22–41)
MCH RBC QN AUTO: 30.2 PG (ref 27–33)
MCHC RBC AUTO-ENTMCNC: 30.7 G/DL (ref 33.6–35)
MCV RBC AUTO: 98.5 FL (ref 81.4–97.8)
MONOCYTES # BLD AUTO: 0.22 K/UL (ref 0–0.85)
MONOCYTES NFR BLD AUTO: 7 % (ref 0–13.4)
NEUTROPHILS # BLD AUTO: 2.28 K/UL (ref 2–7.15)
NEUTROPHILS NFR BLD: 72.2 % (ref 44–72)
NRBC # BLD AUTO: 0 K/UL
NRBC BLD-RTO: 0 /100 WBC
PHOSPHATE SERPL-MCNC: 2 MG/DL (ref 2.5–4.5)
PLATELET # BLD AUTO: 141 K/UL (ref 164–446)
PMV BLD AUTO: 12.6 FL (ref 9–12.9)
POTASSIUM SERPL-SCNC: 4.8 MMOL/L (ref 3.6–5.5)
RBC # BLD AUTO: 3.24 M/UL (ref 4.2–5.4)
SODIUM SERPL-SCNC: 139 MMOL/L (ref 135–145)
TACROLIMUS BLD-MCNC: 13.4 NG/ML
WBC # BLD AUTO: 3.2 K/UL (ref 4.8–10.8)

## 2022-12-29 PROCEDURE — 36415 COLL VENOUS BLD VENIPUNCTURE: CPT

## 2022-12-29 PROCEDURE — 80197 ASSAY OF TACROLIMUS: CPT

## 2022-12-29 PROCEDURE — 80048 BASIC METABOLIC PNL TOTAL CA: CPT

## 2022-12-29 PROCEDURE — 85025 COMPLETE CBC W/AUTO DIFF WBC: CPT

## 2022-12-29 PROCEDURE — 84100 ASSAY OF PHOSPHORUS: CPT

## 2022-12-31 LAB — TACROLIMUS BLD-MCNC: 11.3 NG/ML

## 2023-01-03 ENCOUNTER — HOSPITAL ENCOUNTER (OUTPATIENT)
Dept: LAB | Facility: MEDICAL CENTER | Age: 73
End: 2023-01-03
Attending: INTERNAL MEDICINE
Payer: MEDICARE

## 2023-01-03 ENCOUNTER — OFFICE VISIT (OUTPATIENT)
Dept: ENDOCRINOLOGY | Facility: MEDICAL CENTER | Age: 73
End: 2023-01-03
Payer: MEDICARE

## 2023-01-03 VITALS
SYSTOLIC BLOOD PRESSURE: 144 MMHG | WEIGHT: 127 LBS | HEART RATE: 57 BPM | DIASTOLIC BLOOD PRESSURE: 50 MMHG | BODY MASS INDEX: 21.8 KG/M2 | OXYGEN SATURATION: 99 %

## 2023-01-03 DIAGNOSIS — I25.10 CARDIOVASCULAR DISEASE: ICD-10-CM

## 2023-01-03 DIAGNOSIS — E55.9 VITAMIN D DEFICIENCY: ICD-10-CM

## 2023-01-03 DIAGNOSIS — I10 ESSENTIAL HYPERTENSION: ICD-10-CM

## 2023-01-03 DIAGNOSIS — E11.21 CONTROLLED TYPE 2 DIABETES MELLITUS WITH DIABETIC NEPHROPATHY, WITHOUT LONG-TERM CURRENT USE OF INSULIN (HCC): ICD-10-CM

## 2023-01-03 DIAGNOSIS — E03.9 HYPOTHYROIDISM, ACQUIRED: ICD-10-CM

## 2023-01-03 DIAGNOSIS — Z94.0 KIDNEY TRANSPLANT STATUS: ICD-10-CM

## 2023-01-03 DIAGNOSIS — E78.5 DYSLIPIDEMIA: ICD-10-CM

## 2023-01-03 DIAGNOSIS — E11.43 PERIPHERAL AUTONOMIC NEUROPATHY DUE TO DIABETES MELLITUS (HCC): ICD-10-CM

## 2023-01-03 LAB
ALBUMIN SERPL BCP-MCNC: 4.6 G/DL (ref 3.2–4.9)
ALBUMIN/GLOB SERPL: 2.3 G/DL
ALP SERPL-CCNC: 104 U/L (ref 30–99)
ALT SERPL-CCNC: 15 U/L (ref 2–50)
ANION GAP SERPL CALC-SCNC: 9 MMOL/L (ref 7–16)
APPEARANCE UR: CLEAR
AST SERPL-CCNC: 15 U/L (ref 12–45)
BASOPHILS # BLD AUTO: 0.8 % (ref 0–1.8)
BASOPHILS # BLD: 0.02 K/UL (ref 0–0.12)
BILIRUB SERPL-MCNC: 0.3 MG/DL (ref 0.1–1.5)
BILIRUB UR QL STRIP.AUTO: NEGATIVE
BUN SERPL-MCNC: 18 MG/DL (ref 8–22)
CALCIUM ALBUM COR SERPL-MCNC: 9.8 MG/DL (ref 8.5–10.5)
CALCIUM SERPL-MCNC: 10.3 MG/DL (ref 8.4–10.2)
CHLORIDE SERPL-SCNC: 109 MMOL/L (ref 96–112)
CO2 SERPL-SCNC: 22 MMOL/L (ref 20–33)
COLOR UR: YELLOW
CREAT SERPL-MCNC: 1.07 MG/DL (ref 0.5–1.4)
EOSINOPHIL # BLD AUTO: 0.02 K/UL (ref 0–0.51)
EOSINOPHIL NFR BLD: 0.8 % (ref 0–6.9)
ERYTHROCYTE [DISTWIDTH] IN BLOOD BY AUTOMATED COUNT: 56.1 FL (ref 35.9–50)
GFR SERPLBLD CREATININE-BSD FMLA CKD-EPI: 55 ML/MIN/1.73 M 2
GLOBULIN SER CALC-MCNC: 2 G/DL (ref 1.9–3.5)
GLUCOSE SERPL-MCNC: 92 MG/DL (ref 65–99)
GLUCOSE UR STRIP.AUTO-MCNC: NEGATIVE MG/DL
HCT VFR BLD AUTO: 31.7 % (ref 37–47)
HGB BLD-MCNC: 9.8 G/DL (ref 12–16)
IMM GRANULOCYTES # BLD AUTO: 0.05 K/UL (ref 0–0.11)
IMM GRANULOCYTES NFR BLD AUTO: 1.9 % (ref 0–0.9)
KETONES UR STRIP.AUTO-MCNC: NEGATIVE MG/DL
LEUKOCYTE ESTERASE UR QL STRIP.AUTO: NEGATIVE
LYMPHOCYTES # BLD AUTO: 0.45 K/UL (ref 1–4.8)
LYMPHOCYTES NFR BLD: 17 % (ref 22–41)
MCH RBC QN AUTO: 29.5 PG (ref 27–33)
MCHC RBC AUTO-ENTMCNC: 30.9 G/DL (ref 33.6–35)
MCV RBC AUTO: 95.5 FL (ref 81.4–97.8)
MICRO URNS: NORMAL
MONOCYTES # BLD AUTO: 0.21 K/UL (ref 0–0.85)
MONOCYTES NFR BLD AUTO: 8 % (ref 0–13.4)
NEUTROPHILS # BLD AUTO: 1.89 K/UL (ref 2–7.15)
NEUTROPHILS NFR BLD: 71.5 % (ref 44–72)
NITRITE UR QL STRIP.AUTO: NEGATIVE
NRBC # BLD AUTO: 0 K/UL
NRBC BLD-RTO: 0 /100 WBC
PH UR STRIP.AUTO: 6 [PH] (ref 5–8)
PLATELET # BLD AUTO: 132 K/UL (ref 164–446)
PMV BLD AUTO: 11.6 FL (ref 9–12.9)
POTASSIUM SERPL-SCNC: 5 MMOL/L (ref 3.6–5.5)
PROT SERPL-MCNC: 6.6 G/DL (ref 6–8.2)
PROT UR QL STRIP: NEGATIVE MG/DL
RBC # BLD AUTO: 3.32 M/UL (ref 4.2–5.4)
RBC UR QL AUTO: NEGATIVE
SODIUM SERPL-SCNC: 140 MMOL/L (ref 135–145)
SP GR UR STRIP.AUTO: 1.01
WBC # BLD AUTO: 2.6 K/UL (ref 4.8–10.8)

## 2023-01-03 PROCEDURE — 99211 OFF/OP EST MAY X REQ PHY/QHP: CPT

## 2023-01-03 PROCEDURE — 85025 COMPLETE CBC W/AUTO DIFF WBC: CPT

## 2023-01-03 PROCEDURE — 80197 ASSAY OF TACROLIMUS: CPT

## 2023-01-03 PROCEDURE — 36415 COLL VENOUS BLD VENIPUNCTURE: CPT

## 2023-01-03 PROCEDURE — 80053 COMPREHEN METABOLIC PANEL: CPT

## 2023-01-03 PROCEDURE — 81003 URINALYSIS AUTO W/O SCOPE: CPT

## 2023-01-03 PROCEDURE — 87799 DETECT AGENT NOS DNA QUANT: CPT

## 2023-01-03 PROCEDURE — 99214 OFFICE O/P EST MOD 30 MIN: CPT

## 2023-01-03 RX ORDER — LEVOTHYROXINE SODIUM 0.05 MG/1
50 TABLET ORAL
Qty: 90 TABLET | Refills: 4 | Status: SHIPPED | OUTPATIENT
Start: 2023-01-03 | End: 2023-03-03

## 2023-01-03 RX ORDER — BLOOD SUGAR DIAGNOSTIC
1 STRIP MISCELLANEOUS PRN
Qty: 150 STRIP | Refills: 6 | Status: SHIPPED | OUTPATIENT
Start: 2023-01-03 | End: 2023-09-22

## 2023-01-03 RX ORDER — BLOOD-GLUCOSE METER
EACH MISCELLANEOUS
Qty: 1 KIT | Refills: 0 | Status: SHIPPED | OUTPATIENT
Start: 2023-01-03 | End: 2023-09-04

## 2023-01-03 RX ORDER — BLOOD-GLUCOSE METER
1 EACH MISCELLANEOUS
Qty: 1 KIT | Refills: 1 | Status: SHIPPED | OUTPATIENT
Start: 2023-01-03 | End: 2023-01-03

## 2023-01-03 ASSESSMENT — FIBROSIS 4 INDEX: FIB4 SCORE: 2.14

## 2023-01-03 NOTE — PROGRESS NOTES
"Chief Complaint:  Consult requested by ANGELITA Concepcion for following up on the following conditions  HPI:   Tere Gallegos is a 70 y.o. female    1. Controlled type 2 diabetes mellitus with diabetic nephropathy, without long term current use of insulin:   Type 2 Diabetes Mellitus diagnosed in 20 years ago.      She checks her blood sugars 3x/day  Fasting blood eiokht-        POC a1c on 1/03/2023 at 5.8%  Last A1C on 11/8/21 at 5.7%, she is a dialysis patient which makes A1c unreliable.     Latest Reference Range & Units 01/03/23 07:36   RBC 4.20 - 5.40 M/uL 3.32 (L)   Hemoglobin 12.0 - 16.0 g/dL 9.8 (L)   Hematocrit 37.0 - 47.0 % 31.7 (L)     Diabetes management:  Not taking any po medications   Patient is taking insulin since kidney transplant-unknown dosages or name of medications  Lantus and NovoLog on file     She denies hypoglycemic episodes.    She  denies hypoglycemic unawareness.   She denies episodes of severe hypoglycemia requiring third party assistance.      Diet: \"healthy\" diet  in general  Exercise:    Diabetes Complications   She  reports history of mild proliferative diabetic retinopathy.    She denies laser eye surgery.   Cataract surgery both eyes were done this year   Last eye exam: Unknown, but next appointment is on December 14th 2021.  Opthlmology appt is on January 10th      2.  Peripheral neuropathy due to diabetes:  She reports history of peripheral sensory neuropathy.    She reports numbness, to both arms bilaterally when she is asleep he wakes her up from sleep-This  resolves within a few minutes with arm movement.   Denies numbness or tingling to her feet but reports pain occasionally she is currently taking Lyrica.  she denies history of foot sores.     3.  Kidney transplant status:  Surgery 2 months ago in [facility].     nephrologist. is Dr. Priyank Villar.       Latest Reference Range & Units 01/03/23 07:36   Bun 8 - 22 mg/dL 18   Creatinine 0.50 - 1.40 " mg/dL 1.07   GFR (CKD-EPI) >60 mL/min/1.73 m 2 55 !     4.  Cardiovascular disease:  Paroxysmal Atrial Fibrillation-She is taking Eliquis.   She is seeing at [facility]-Aster Santamaria     5.  Hypothyroidism, acquired:   She is currently levothyroixine 50 mcg of levothyroxine daily   She takes it every morning on an empty stomach  Denies taking any iron, calcium, multivitamins, antiacids with the medication    Denies fatigue, constipation, dry skin, palpitations.      Latest Reference Range & Units 11/27/22 11:02   TSH 0.380 - 5.330 uIU/mL 1.550   Free T-4 0.93 - 1.70 ng/dL 1.67     6. Hypertension, unspecified type:  FV by cardiology   Currently taking Norvasc 10 mg, carvedilol 25 mg, lisinopril 40 mg  BP in office today was   Denies any dizziness, palpitations, chest pain    7.  Dyslipidemia:  She is currently taking statin-unknown dosage of her normal medications-atorvastatin 20 mg daily on 5  She was taking a statin in the past but it was stopped due to muscle aches  She still complaining of muscle aches especially in her lower legs     Latest Reference Range & Units 12/19/22 08:10   Cholesterol,Tot 100 - 199 mg/dL 117   Triglycerides 0 - 149 mg/dL 177 (H)   HDL >=40 mg/dL 39 !   LDL <100 mg/dL 43       8. Vitamin D deficiency:  Currently not taking any vitamin D     Latest Reference Range & Units 11/19/20 11:44   25-Hydroxy   Vitamin D 25 30 - 100 ng/mL 37     ROS:     CONS:     No fever, no chills, no weight loss, no fatigue   EYES:      No diplopia, no blurry vision, no redness of eyes, no swelling of eyelids   ENT:    No hearing loss, No ear pain, No sore throat, no dysphagia, no neck swelling   CV:     No chest pain, no palpitations, no claudication, no orthopnea, no PND   PULM:    No SOB, no cough, no hemoptysis, no wheezing    GI:   No nausea, no vomiting, no diarrhea, no constipation, no bloody stools   :  Passing urine well, no dysuria, no hematuria   ENDO:    No polyuria, no polydipsia, no heat intolerance, no cold intolerance   NEURO: No headaches, no dizziness, no convulsions, no tremors   MUSC:  No joint swellings, no arthralgias, no myalgias, no weakness   SKIN:   No rash, no ulcers, no dry skin   PSYCH:   No depression, no anxiety, no difficulty sleeping       Past Medical History:  Patient Active Problem List    Diagnosis Date Noted    Chronic anticoagulation 2022    Long-term insulin use ([facility]) 2022    Kidney transplant recipient 2022    Lung nodules 10/22/2022    GERD (gastroesophageal reflux disease) 10/19/2022    Normocytic anemia 2022    Acute diastolic CHF (congestive heart failure) ([facility]) 2022    Paroxysmal A-fib ([facility]) 2021    Acquired hypothyroidism 2020    Dental disorder 2020    Coronary artery disease with angina pectoris with documented spasm ([facility])     Mixed hyperlipidemia 2019    Controlled type 2 diabetes mellitus with chronic kidney disease on chronic dialysis, with long-term current use of insulin ([facility]) 2016    Renovascular hypertension 2013       Past Surgical History:  Past Surgical History:   Procedure Laterality Date    OTHER ABDOMINAL SURGERY Right 10/31/2022     Donor kidney transplant - [facility] CARDIAC CATH  2016    RCA stented with 2 Synergy drug-eluting stents.    RECOVERY  2016    Procedure: CATH LAB [facility] WITH POSSIBLE DR. CASTILLO;  Surgeon: [facility];  Location: SURGERY PRE-POST PROC UNIT [facility];  Service:     [facility] CARDIAC CATH  2016    100% RCA    OTHER Left     left arm upper extremity fistula    OTHER ABDOMINAL SURGERY      left kidney removed due to cancer        Allergies:  Patient has no known allergies.     Current Medications:    Current Outpatient Medications:     carvedilol (COREG) 6.25 MG Tab, Take 1 Tablet by mouth 2 times a day., Disp: 200 Tablet, Rfl: 3    atorvastatin (LIPITOR) 20 MG Tab, Take 1  Tablet by mouth every evening., Disp: 100 Tablet, Rfl: 3    furosemide (LASIX) 20 MG Tab, Take 1 Tablet by mouth 1 time a day as needed (leg swelling)., Disp: 30 Tablet, Rfl: 11    losartan (COZAAR) 50 MG Tab, Take 1 Tablet by mouth every evening., Disp: 100 Tablet, Rfl: 3    magnesium oxide 400 (240 Mg) MG Tab, Take 1 Tablet by mouth every day., Disp: 10 Tablet, Rfl: 0    omeprazole (PRILOSEC) 20 MG delayed-release capsule, Take 1 Capsule by mouth every day., Disp: 30 Capsule, Rfl: 0    docusate sodium (COLACE) 100 MG Cap, Take 100-200 mg by mouth 2 times a day as needed for Constipation. Indications: Constipation, Disp: , Rfl:     glucose blood (TRUE METRIX BLOOD GLUCOSE TEST) strip, 1 Strip by Other route 3 times a day with meals., Disp: 300 Strip, Rfl: 3    Lancets, Use one Freestyle Pearl lancet to test blood sugar once daily ., Disp: 300 Each, Rfl: 3    sodium bicarbonate (SODIUM BICARBONATE) 650 MG Tab, Take 2 Tablets by mouth in the morning, at noon, and at bedtime., Disp: 120 Tablet, Rfl: 3    valGANciclovir (VALCYTE) 450 MG tablet, Take 1 Tablet by mouth every day., Disp: , Rfl:     fluticasone (FLONASE) 50 MCG/ACT nasal spray, ADMINISTER 1 SPRAY INTO AFFECTED NOSTRIL(S) EVERY DAY., Disp: 16 mL, Rfl: 1    insulin glargine 100 UNIT/ML SC SOLN, Inject 8 Units under the skin every day., Disp: , Rfl:     insulin aspart (NOVOLOG FLEXPEN) 100 UNIT/ML injection PEN, Inject 2-8 Units under the skin 3 times a day before meals. Sliding Scale, Disp: , Rfl:     levothyroxine (SYNTHROID) 50 MCG Tab, Take 50 mcg by mouth every morning on an empty stomach., Disp: , Rfl:     predniSONE (DELTASONE) 5 MG Tab, Take 2 Tablets by mouth every day., Disp: , Rfl:     clotrimazole (MYCELEX) 10 MG Thanh thanh, Take 1 Thanh by mouth 4 times a day., Disp: , Rfl:     mycophenolate (CELLCEPT) 500 MG tablet, Take 2 Tablets by mouth 2 times a day., Disp: , Rfl:     sulfamethoxazole-trimethoprim (BACTRIM) 400-80 MG Tab, Take 1 Tablet  by mouth every day. Pt started on 11/1/2022 for 3 month course, per pts son, Disp: , Rfl:     tacrolimus (PROGRAF) 1 MG Cap, Take 1 Capsule by mouth 2 times a day., Disp: , Rfl:     apixaban (ELIQUIS) 2.5mg Tab, Take 1 Tablet by mouth 2 times a day. Indications: Thromboembolism secondary to Atrial Fibrillation, Disp: 180 Tablet, Rfl: 3    Social History:  Social History     Socioeconomic History    Marital status:      Spouse name: Not on file    Number of children: Not on file    Years of education: Not on file    Highest education level: Not on file   Occupational History    Not on file   Tobacco Use    Smoking status: Never    Smokeless tobacco: Never   Vaping Use    Vaping Use: Never used   Substance and Sexual Activity    Alcohol use: No     Alcohol/week: 0.0 oz    Drug use: No    Sexual activity: Never   Other Topics Concern    Not on file   Social History Narrative    Not on file     Social Determinants of Health     Financial Resource Strain: Not on file   Food Insecurity: Not on file   Transportation Needs: Not on file   Physical Activity: Not on file   Stress: Not on file   Social Connections: Not on file   Intimate Partner Violence: Not on file   Housing Stability: Not on file        Family History:   Family History   Problem Relation Age of Onset    Diabetes Sister     Other Sister         liver disease    Diabetes Brother     Heart Disease Neg Hx        PHYSICAL EXAM:   Vital signs: BP (!) 144/50 (BP Location: Right arm, Patient Position: Sitting, BP Cuff Size: Adult)   Pulse (!) 57   Wt 57.6 kg (127 lb)   LMP  (LMP Unknown)   SpO2 99%   BMI 21.80 kg/m²   GENERAL: Well-developed, well-nourished  in no apparent distress.   EYE: No ocular and eyelid asymmetry, Anicteric sclerae,  PERRL, No exophthalmos or lidlag  HENT: Hearing grossly intact, Normocephalic, atraumatic.   NECK: Supple. Trachea midline. thyroid is normal in size without nodules or tenderness  CARDIOVASCULAR: Regular rate and  rhythm. No murmurs, rubs, or gallops.   LUNGS: Clear to auscultation bilaterally   EXTREMITIES: No clubbing, cyanosis, or edema.   NEUROLOGICAL: Cranial nerves II-XII are grossly intact   Symmetric reflexes at the patella no proximal muscle weakness, No visible tremor with both outstretched hands  LYMPH: No cervical, supraclavicular,  adenopathy palpated.   SKIN: No rashes, lesions. Turgor is normal.  FOOT: Normal sensation to monofilament testing, normal pulses, no ulcers.  Normal Vibration quantitative sensation test.    Labs:  Lab Results   Component Value Date/Time    HBA1C 5.7 (A) 12/08/2021 1520    AVGLUC 111 04/28/2021 0937     Lab Results   Component Value Date/Time    CHOLSTRLTOT 125 09/29/2020 1056    TRIGLYCERIDE 113 09/29/2020 1056    HDL 48 09/29/2020 1056    LDL 54 09/29/2020 1056       Lab Results   Component Value Date/Time    MALBCRT 6207 (H) 09/29/2020 10:55 AM    MICROALBUR 104.4 09/29/2020 10:55 AM        Lab Results   Component Value Date/Time    TSHULTRASEN 4.150 08/12/2021 0201     Lab Results   Component Value Date/Time    FREEDIR 1.52 01/28/2021 0700         ASSESSMENT/PLAN:   1. Controlled type 2 diabetes mellitus with diabetic nephropathy, without long-term current use of insulin (HCC)  Controlled with an A1c of 5.8%  Lifestyle modifications discussed    I ordered blood work for patient to do prior to her appointment with me in 4-8 weeks  Patient instructed to bring the name of all of her medications at next appointment  Continue current regimen    - FRUCTOSAMINE; Future  - MICROALBUMIN CREAT RATIO URINE; Future  - Blood Glucose Monitoring Suppl (ONETOUCH VERIO) w/Device Kit; 1 Device 3 times a day before meals.  Dispense: 1 Kit; Refill: 1  - glucose blood (ONETOUCH VERIO) strip; 1 Strip by Other route as needed (on insulin checking 3-4 times a day).  Dispense: 150 Strip; Refill: 6    2. Peripheral autonomic neuropathy due to diabetes mellitus (HCC)  Unstable    3. Kidney transplant  status  Stable  Following nephrology    4. Cardiovascular disease  Stable  Followed by cardiology    5. Hypothyroidism, acquired  Clinically stable  Biochemically stable  continue regimen-HPI   - levothyroxine (SYNTHROID) 50 MCG Tab; Take 1 Tablet by mouth every morning on an empty stomach.  Dispense: 90 Tablet; Refill: 4  - FREE THYROXINE; Future  - TSH; Future  - Comp Metabolic Panel; Future    6. Essential hypertension  Unstable  Followed by cardiology    7. Dyslipidemia  Unstable  Continue regimen-HPI  - Lipid Profile; Future    8. Vitamin D deficiency  Unstable  - VITAMIN D,25 HYDROXY (DEFICIENCY); Future     Disposition: Follow-up in 4 to 8 weeks  Do blood work 1 week prior to next appointment with me    Thank you kindly for allowing me to participate in the diabetes care plan for this patient.    BALDEMAR GuzmánRCARLENE  12/08/21    CC:   ANGELITA Concepcion

## 2023-01-04 ENCOUNTER — APPOINTMENT (OUTPATIENT)
Dept: CARDIOLOGY | Facility: MEDICAL CENTER | Age: 73
End: 2023-01-04
Payer: MEDICARE

## 2023-01-05 LAB — TACROLIMUS BLD-MCNC: 11.9 NG/ML

## 2023-01-06 LAB
BKV DNA # SPEC NAA+PROBE: <390 CPY/ML
BKV DNA # UR NAA+PROBE: ABNORMAL
BKV DNA SPEC NAA+PROBE-LOG#: 4.8 LOG CPY/ML
BKV DNA SPEC NAA+PROBE-LOG#: <2.6 LOG
DIAGNOSTIC IMP SPEC-IMP: DETECTED
DIAGNOSTIC IMP SPEC-IMP: NOT DETECTED

## 2023-01-09 ENCOUNTER — HOSPITAL ENCOUNTER (OUTPATIENT)
Dept: LAB | Facility: MEDICAL CENTER | Age: 73
End: 2023-01-09
Attending: INTERNAL MEDICINE
Payer: MEDICARE

## 2023-01-09 LAB
ANION GAP SERPL CALC-SCNC: 10 MMOL/L (ref 7–16)
BASOPHILS # BLD AUTO: 1 % (ref 0–1.8)
BASOPHILS # BLD: 0.02 K/UL (ref 0–0.12)
BUN SERPL-MCNC: 21 MG/DL (ref 8–22)
CALCIUM SERPL-MCNC: 10 MG/DL (ref 8.4–10.2)
CHLORIDE SERPL-SCNC: 108 MMOL/L (ref 96–112)
CO2 SERPL-SCNC: 19 MMOL/L (ref 20–33)
CREAT SERPL-MCNC: 1.16 MG/DL (ref 0.5–1.4)
EOSINOPHIL # BLD AUTO: 0.02 K/UL (ref 0–0.51)
EOSINOPHIL NFR BLD: 1 % (ref 0–6.9)
ERYTHROCYTE [DISTWIDTH] IN BLOOD BY AUTOMATED COUNT: 55.2 FL (ref 35.9–50)
FASTING STATUS PATIENT QL REPORTED: NORMAL
GFR SERPLBLD CREATININE-BSD FMLA CKD-EPI: 50 ML/MIN/1.73 M 2
GLUCOSE SERPL-MCNC: 100 MG/DL (ref 65–99)
HCT VFR BLD AUTO: 32.6 % (ref 37–47)
HGB BLD-MCNC: 10.2 G/DL (ref 12–16)
IMM GRANULOCYTES # BLD AUTO: 0.04 K/UL (ref 0–0.11)
IMM GRANULOCYTES NFR BLD AUTO: 2 % (ref 0–0.9)
LYMPHOCYTES # BLD AUTO: 0.57 K/UL (ref 1–4.8)
LYMPHOCYTES NFR BLD: 29.1 % (ref 22–41)
MCH RBC QN AUTO: 29.8 PG (ref 27–33)
MCHC RBC AUTO-ENTMCNC: 31.3 G/DL (ref 33.6–35)
MCV RBC AUTO: 95.3 FL (ref 81.4–97.8)
MONOCYTES # BLD AUTO: 0.17 K/UL (ref 0–0.85)
MONOCYTES NFR BLD AUTO: 8.7 % (ref 0–13.4)
NEUTROPHILS # BLD AUTO: 1.14 K/UL (ref 2–7.15)
NEUTROPHILS NFR BLD: 58.2 % (ref 44–72)
NRBC # BLD AUTO: 0 K/UL
NRBC BLD-RTO: 0 /100 WBC
PHOSPHATE SERPL-MCNC: 2.3 MG/DL (ref 2.5–4.5)
PLATELET # BLD AUTO: 151 K/UL (ref 164–446)
PMV BLD AUTO: 12.8 FL (ref 9–12.9)
POTASSIUM SERPL-SCNC: 5 MMOL/L (ref 3.6–5.5)
RBC # BLD AUTO: 3.42 M/UL (ref 4.2–5.4)
SODIUM SERPL-SCNC: 137 MMOL/L (ref 135–145)
WBC # BLD AUTO: 2 K/UL (ref 4.8–10.8)

## 2023-01-09 PROCEDURE — 80197 ASSAY OF TACROLIMUS: CPT

## 2023-01-09 PROCEDURE — 84100 ASSAY OF PHOSPHORUS: CPT

## 2023-01-09 PROCEDURE — 80048 BASIC METABOLIC PNL TOTAL CA: CPT

## 2023-01-09 PROCEDURE — 85025 COMPLETE CBC W/AUTO DIFF WBC: CPT

## 2023-01-09 PROCEDURE — 36415 COLL VENOUS BLD VENIPUNCTURE: CPT

## 2023-01-10 ENCOUNTER — TELEPHONE (OUTPATIENT)
Dept: CARDIOLOGY | Facility: MEDICAL CENTER | Age: 73
End: 2023-01-10
Payer: MEDICARE

## 2023-01-10 LAB — TACROLIMUS BLD-MCNC: 12.7 NG/ML

## 2023-01-10 NOTE — TELEPHONE ENCOUNTER
Phone Number Called: [phone]    Call outcome:  Spoke with patients son    Message: Called to discuss patients BP readings. Son is not currently home, patient does not speak English. Son stated he would call back with BP readings.   -------------------------------------------------------------  ----- Message from ANGELITA Keen sent at 1/10/2023  9:00 AM PST -----  Regarding: BP CHECK  Can you call her and check on BP? SC  ----- Message -----  From: ANGELITA Keen  Sent: 1/10/2023  12:00 AM PST  To: ANGELITA Keen  Subject: BP check

## 2023-01-11 ENCOUNTER — TELEPHONE (OUTPATIENT)
Dept: CARDIOLOGY | Facility: MEDICAL CENTER | Age: 73
End: 2023-01-11
Payer: MEDICARE

## 2023-01-11 NOTE — TELEPHONE ENCOUNTER
Phone Number Called: [phone]    Call outcome:  Spoke to Son    Message: Called to discuss patients BP. Patients son answered and said patients BP readings have been good but was unable to provide me with any of the readings. Also stated she has been feeling good and no complaints. Will call back next week to check again.     ------------------------------------------------------------  ----- Message from ANGELITA Keen sent at 1/10/2023  9:00 AM PST -----  Regarding: BP CHECK  Can you call her and check on BP? SC  ----- Message -----  From: ANGELITA Keen  Sent: 1/10/2023  12:00 AM PST  To: ANGELITA Keen  Subject: BP check

## 2023-01-16 ENCOUNTER — HOSPITAL ENCOUNTER (OUTPATIENT)
Dept: LAB | Facility: MEDICAL CENTER | Age: 73
End: 2023-01-16
Attending: INTERNAL MEDICINE
Payer: MEDICARE

## 2023-01-16 LAB
ANION GAP SERPL CALC-SCNC: 8 MMOL/L (ref 7–16)
ANISOCYTOSIS BLD QL SMEAR: ABNORMAL
BASOPHILS # BLD AUTO: 1 % (ref 0–1.8)
BASOPHILS # BLD: 0.01 K/UL (ref 0–0.12)
BUN SERPL-MCNC: 29 MG/DL (ref 8–22)
CALCIUM SERPL-MCNC: 9.9 MG/DL (ref 8.4–10.2)
CHLORIDE SERPL-SCNC: 107 MMOL/L (ref 96–112)
CO2 SERPL-SCNC: 20 MMOL/L (ref 20–33)
CREAT SERPL-MCNC: 1.29 MG/DL (ref 0.5–1.4)
EOSINOPHIL # BLD AUTO: 0.02 K/UL (ref 0–0.51)
EOSINOPHIL NFR BLD: 2 % (ref 0–6.9)
ERYTHROCYTE [DISTWIDTH] IN BLOOD BY AUTOMATED COUNT: 53.5 FL (ref 35.9–50)
GFR SERPLBLD CREATININE-BSD FMLA CKD-EPI: 44 ML/MIN/1.73 M 2
GLUCOSE SERPL-MCNC: 133 MG/DL (ref 65–99)
HCT VFR BLD AUTO: 33.5 % (ref 37–47)
HGB BLD-MCNC: 10.4 G/DL (ref 12–16)
LYMPHOCYTES # BLD AUTO: 0.36 K/UL (ref 1–4.8)
LYMPHOCYTES NFR BLD: 33 % (ref 22–41)
MANUAL DIFF BLD: NORMAL
MCH RBC QN AUTO: 30 PG (ref 27–33)
MCHC RBC AUTO-ENTMCNC: 31 G/DL (ref 33.6–35)
MCV RBC AUTO: 96.5 FL (ref 81.4–97.8)
MONOCYTES # BLD AUTO: 0.12 K/UL (ref 0–0.85)
MONOCYTES NFR BLD AUTO: 11 % (ref 0–13.4)
NEUTROPHILS # BLD AUTO: 0.58 K/UL (ref 2–7.15)
NEUTROPHILS NFR BLD: 53 % (ref 44–72)
NRBC # BLD AUTO: 0 K/UL
NRBC BLD-RTO: 0 /100 WBC
OVALOCYTES BLD QL SMEAR: NORMAL
PHOSPHATE SERPL-MCNC: 2.3 MG/DL (ref 2.5–4.5)
PLATELET # BLD AUTO: 115 K/UL (ref 164–446)
PLATELET BLD QL SMEAR: NORMAL
PMV BLD AUTO: 10.8 FL (ref 9–12.9)
POTASSIUM SERPL-SCNC: 5.6 MMOL/L (ref 3.6–5.5)
RBC # BLD AUTO: 3.47 M/UL (ref 4.2–5.4)
RBC BLD AUTO: PRESENT
SMUDGE CELLS BLD QL SMEAR: NORMAL
SODIUM SERPL-SCNC: 135 MMOL/L (ref 135–145)
WBC # BLD AUTO: 1.1 K/UL (ref 4.8–10.8)

## 2023-01-16 PROCEDURE — 85025 COMPLETE CBC W/AUTO DIFF WBC: CPT

## 2023-01-16 PROCEDURE — 36415 COLL VENOUS BLD VENIPUNCTURE: CPT

## 2023-01-16 PROCEDURE — 80048 BASIC METABOLIC PNL TOTAL CA: CPT

## 2023-01-16 PROCEDURE — 84100 ASSAY OF PHOSPHORUS: CPT

## 2023-01-16 PROCEDURE — 85007 BL SMEAR W/DIFF WBC COUNT: CPT

## 2023-01-16 PROCEDURE — 80197 ASSAY OF TACROLIMUS: CPT

## 2023-01-17 LAB — TACROLIMUS BLD-MCNC: 29.9 NG/ML

## 2023-01-18 ENCOUNTER — TELEPHONE (OUTPATIENT)
Dept: CARDIOLOGY | Facility: MEDICAL CENTER | Age: 73
End: 2023-01-18
Payer: MEDICARE

## 2023-01-18 DIAGNOSIS — I50.31 ACUTE DIASTOLIC CHF (CONGESTIVE HEART FAILURE) (HCC): ICD-10-CM

## 2023-01-18 DIAGNOSIS — I10 ESSENTIAL HYPERTENSION: ICD-10-CM

## 2023-01-18 RX ORDER — LOSARTAN POTASSIUM 100 MG/1
100 TABLET ORAL EVERY EVENING
Qty: 90 TABLET | Refills: 3 | Status: ON HOLD | OUTPATIENT
Start: 2023-01-18 | End: 2023-09-25

## 2023-01-18 NOTE — TELEPHONE ENCOUNTER
"Phone Number Called: [phone] & Language Line - Niuean    Call outcome: Spoke to patient regarding message below.    Message: Called to inform patient of SC recommendations \"Increase losartan to 100 mg QD\"   New Rx sent pharmacy, will follow up in 1 week for BP check.    "

## 2023-01-18 NOTE — TELEPHONE ENCOUNTER
Phone Number Called: [phone]  and Language Line- Cameroonian    Call outcome: Spoke to patient regarding message below.    Message: Called to discuss BP readings.   179/65    HR 55  158/51   160/50     Patient states that her systolic BP has been above 130, nothing less than that. Patient states she is taking all her medications as prescribed.     SC-Please advise on BP and any medication adjustments.

## 2023-01-18 NOTE — TELEPHONE ENCOUNTER
Phone Number Called: [phone]    Call outcome:  Spoke with patients son.     Message: Called to check on patients blood pressure, number we have on file is her sons number. Will call  [phone] and use Language line to speak with patient.   -------------------------------------------------------------  ----- Message from ANGELITA Keen sent at 1/10/2023  9:00 AM PST -----  Regarding: BP CHECK  Can you call her and check on BP? SC  ----- Message -----  From: ANGELITA Keen  Sent: 1/10/2023  12:00 AM PST  To: ANGELITA Keen  Subject: BP check

## 2023-01-18 NOTE — TELEPHONE ENCOUNTER
----- Message from ANGELITA Keen sent at 1/10/2023  9:00 AM PST -----  Regarding: BP CHECK  Can you call her and check on BP? SC  ----- Message -----  From: ANGELITA Keen  Sent: 1/10/2023  12:00 AM PST  To: ANGELITA Keen  Subject: BP check

## 2023-01-19 ENCOUNTER — OFFICE VISIT (OUTPATIENT)
Dept: MEDICAL GROUP | Facility: MEDICAL CENTER | Age: 73
End: 2023-01-19
Payer: MEDICARE

## 2023-01-19 VITALS
DIASTOLIC BLOOD PRESSURE: 56 MMHG | HEIGHT: 64 IN | BODY MASS INDEX: 22.2 KG/M2 | SYSTOLIC BLOOD PRESSURE: 138 MMHG | OXYGEN SATURATION: 100 % | WEIGHT: 130 LBS | TEMPERATURE: 97.8 F | HEART RATE: 58 BPM

## 2023-01-19 DIAGNOSIS — Z23 NEED FOR VACCINATION: ICD-10-CM

## 2023-01-19 DIAGNOSIS — G25.81 RLS (RESTLESS LEGS SYNDROME): ICD-10-CM

## 2023-01-19 PROCEDURE — 90662 IIV NO PRSV INCREASED AG IM: CPT | Performed by: NURSE PRACTITIONER

## 2023-01-19 PROCEDURE — G0008 ADMIN INFLUENZA VIRUS VAC: HCPCS | Performed by: NURSE PRACTITIONER

## 2023-01-19 PROCEDURE — 99214 OFFICE O/P EST MOD 30 MIN: CPT | Mod: 25 | Performed by: NURSE PRACTITIONER

## 2023-01-19 RX ORDER — GABAPENTIN 100 MG/1
100 CAPSULE ORAL EVERY EVENING
Qty: 90 CAPSULE | Refills: 3 | Status: SHIPPED | OUTPATIENT
Start: 2023-01-19 | End: 2023-03-31

## 2023-01-19 RX ORDER — AMLODIPINE BESYLATE 10 MG/1
10 TABLET ORAL DAILY
COMMUNITY
Start: 2022-12-29 | End: 2023-09-04

## 2023-01-19 RX ORDER — INSULIN GLARGINE 100 [IU]/ML
5 INJECTION, SOLUTION SUBCUTANEOUS EVERY EVENING
Status: ON HOLD | COMMUNITY
End: 2023-09-25

## 2023-01-19 RX ORDER — PANTOPRAZOLE SODIUM 40 MG/1
TABLET, DELAYED RELEASE ORAL
COMMUNITY
Start: 2023-01-18 | End: 2023-02-06

## 2023-01-19 ASSESSMENT — FIBROSIS 4 INDEX: FIB4 SCORE: 2.46

## 2023-01-19 ASSESSMENT — PATIENT HEALTH QUESTIONNAIRE - PHQ9: CLINICAL INTERPRETATION OF PHQ2 SCORE: 0

## 2023-01-19 NOTE — ASSESSMENT & PLAN NOTE
Chronic problem. Starts at around 9pm. No longer on dialysis after renal transplant. Previously on ropinirole for this with minimal improvement.      Chronic. Previously improved with gabapentin 100 mg nightly as needed, however she is not currently taking this, it is not in her medication bag.

## 2023-01-23 ENCOUNTER — HOSPITAL ENCOUNTER (OUTPATIENT)
Dept: LAB | Facility: MEDICAL CENTER | Age: 73
End: 2023-01-23
Attending: NURSE PRACTITIONER
Payer: MEDICARE

## 2023-01-23 DIAGNOSIS — E55.9 VITAMIN D DEFICIENCY: ICD-10-CM

## 2023-01-23 DIAGNOSIS — E03.9 HYPOTHYROIDISM, ACQUIRED: ICD-10-CM

## 2023-01-23 DIAGNOSIS — E78.5 DYSLIPIDEMIA: ICD-10-CM

## 2023-01-23 DIAGNOSIS — E11.21 CONTROLLED TYPE 2 DIABETES MELLITUS WITH DIABETIC NEPHROPATHY, WITHOUT LONG-TERM CURRENT USE OF INSULIN (HCC): ICD-10-CM

## 2023-01-23 LAB
25(OH)D3 SERPL-MCNC: 16 NG/ML (ref 30–100)
ALBUMIN SERPL BCP-MCNC: 4.3 G/DL (ref 3.2–4.9)
ALBUMIN/GLOB SERPL: 2 G/DL
ALP SERPL-CCNC: 113 U/L (ref 30–99)
ALT SERPL-CCNC: 9 U/L (ref 2–50)
ANION GAP SERPL CALC-SCNC: 10 MMOL/L (ref 7–16)
ANION GAP SERPL CALC-SCNC: 10 MMOL/L (ref 7–16)
ANISOCYTOSIS BLD QL SMEAR: ABNORMAL
AST SERPL-CCNC: 17 U/L (ref 12–45)
BASOPHILS # BLD AUTO: 1 % (ref 0–1.8)
BASOPHILS # BLD: 0.01 K/UL (ref 0–0.12)
BILIRUB SERPL-MCNC: 0.3 MG/DL (ref 0.1–1.5)
BUN SERPL-MCNC: 26 MG/DL (ref 8–22)
BUN SERPL-MCNC: 26 MG/DL (ref 8–22)
CALCIUM ALBUM COR SERPL-MCNC: 9.5 MG/DL (ref 8.5–10.5)
CALCIUM SERPL-MCNC: 9.7 MG/DL (ref 8.4–10.2)
CALCIUM SERPL-MCNC: 9.9 MG/DL (ref 8.4–10.2)
CHLORIDE SERPL-SCNC: 106 MMOL/L (ref 96–112)
CHLORIDE SERPL-SCNC: 108 MMOL/L (ref 96–112)
CHOLEST SERPL-MCNC: 116 MG/DL (ref 100–199)
CO2 SERPL-SCNC: 20 MMOL/L (ref 20–33)
CO2 SERPL-SCNC: 21 MMOL/L (ref 20–33)
CREAT SERPL-MCNC: 1 MG/DL (ref 0.5–1.4)
CREAT SERPL-MCNC: 1.02 MG/DL (ref 0.5–1.4)
CREAT UR-MCNC: 69.97 MG/DL
EOSINOPHIL # BLD AUTO: 0.01 K/UL (ref 0–0.51)
EOSINOPHIL NFR BLD: 1 % (ref 0–6.9)
ERYTHROCYTE [DISTWIDTH] IN BLOOD BY AUTOMATED COUNT: 50.4 FL (ref 35.9–50)
FASTING STATUS PATIENT QL REPORTED: NORMAL
GFR SERPLBLD CREATININE-BSD FMLA CKD-EPI: 58 ML/MIN/1.73 M 2
GFR SERPLBLD CREATININE-BSD FMLA CKD-EPI: 59 ML/MIN/1.73 M 2
GLOBULIN SER CALC-MCNC: 2.1 G/DL (ref 1.9–3.5)
GLUCOSE SERPL-MCNC: 95 MG/DL (ref 65–99)
GLUCOSE SERPL-MCNC: 97 MG/DL (ref 65–99)
HCT VFR BLD AUTO: 34.2 % (ref 37–47)
HDLC SERPL-MCNC: 45 MG/DL
HGB BLD-MCNC: 10.9 G/DL (ref 12–16)
LDLC SERPL CALC-MCNC: 49 MG/DL
LYMPHOCYTES # BLD AUTO: 0.7 K/UL (ref 1–4.8)
LYMPHOCYTES NFR BLD: 50 % (ref 22–41)
MANUAL DIFF BLD: NORMAL
MCH RBC QN AUTO: 30.3 PG (ref 27–33)
MCHC RBC AUTO-ENTMCNC: 31.9 G/DL (ref 33.6–35)
MCV RBC AUTO: 95 FL (ref 81.4–97.8)
MICROALBUMIN UR-MCNC: <1.2 MG/DL
MICROALBUMIN/CREAT UR: NORMAL MG/G (ref 0–30)
MONOCYTES # BLD AUTO: 0.17 K/UL (ref 0–0.85)
MONOCYTES NFR BLD AUTO: 12 % (ref 0–13.4)
NEUTROPHILS # BLD AUTO: 0.5 K/UL (ref 2–7.15)
NEUTROPHILS NFR BLD: 36 % (ref 44–72)
NRBC # BLD AUTO: 0 K/UL
NRBC BLD-RTO: 0 /100 WBC
OVALOCYTES BLD QL SMEAR: NORMAL
PHOSPHATE SERPL-MCNC: 2.6 MG/DL (ref 2.5–4.5)
PLATELET # BLD AUTO: 121 K/UL (ref 164–446)
PLATELET BLD QL SMEAR: NORMAL
PMV BLD AUTO: 11 FL (ref 9–12.9)
POTASSIUM SERPL-SCNC: 4.9 MMOL/L (ref 3.6–5.5)
POTASSIUM SERPL-SCNC: 5 MMOL/L (ref 3.6–5.5)
PROT SERPL-MCNC: 6.4 G/DL (ref 6–8.2)
RBC # BLD AUTO: 3.6 M/UL (ref 4.2–5.4)
RBC BLD AUTO: PRESENT
SMUDGE CELLS BLD QL SMEAR: NORMAL
SODIUM SERPL-SCNC: 137 MMOL/L (ref 135–145)
SODIUM SERPL-SCNC: 138 MMOL/L (ref 135–145)
T4 FREE SERPL-MCNC: 1.21 NG/DL (ref 0.93–1.7)
TRIGL SERPL-MCNC: 109 MG/DL (ref 0–149)
TSH SERPL DL<=0.005 MIU/L-ACNC: 11.04 UIU/ML (ref 0.38–5.33)
WBC # BLD AUTO: 1.4 K/UL (ref 4.8–10.8)

## 2023-01-23 PROCEDURE — 84439 ASSAY OF FREE THYROXINE: CPT

## 2023-01-23 PROCEDURE — 82043 UR ALBUMIN QUANTITATIVE: CPT

## 2023-01-23 PROCEDURE — 85025 COMPLETE CBC W/AUTO DIFF WBC: CPT

## 2023-01-23 PROCEDURE — 84100 ASSAY OF PHOSPHORUS: CPT

## 2023-01-23 PROCEDURE — 80053 COMPREHEN METABOLIC PANEL: CPT

## 2023-01-23 PROCEDURE — 80061 LIPID PANEL: CPT

## 2023-01-23 PROCEDURE — 80197 ASSAY OF TACROLIMUS: CPT

## 2023-01-23 PROCEDURE — 82570 ASSAY OF URINE CREATININE: CPT

## 2023-01-23 PROCEDURE — 36415 COLL VENOUS BLD VENIPUNCTURE: CPT

## 2023-01-23 PROCEDURE — 84443 ASSAY THYROID STIM HORMONE: CPT

## 2023-01-23 PROCEDURE — 82306 VITAMIN D 25 HYDROXY: CPT

## 2023-01-23 PROCEDURE — 82985 ASSAY OF GLYCATED PROTEIN: CPT

## 2023-01-23 PROCEDURE — 85007 BL SMEAR W/DIFF WBC COUNT: CPT

## 2023-01-23 PROCEDURE — 80048 BASIC METABOLIC PNL TOTAL CA: CPT

## 2023-01-24 LAB — FRUCTOSAMINE SERPL-SCNC: 259 UMOL/L (ref 205–285)

## 2023-01-25 LAB — TACROLIMUS BLD-MCNC: 10.6 NG/ML

## 2023-01-25 RX ORDER — CHLORTHALIDONE 25 MG/1
12.5 TABLET ORAL DAILY
Qty: 45 TABLET | Refills: 3 | Status: SHIPPED | OUTPATIENT
Start: 2023-01-25 | End: 2023-02-15 | Stop reason: SDUPTHER

## 2023-01-25 NOTE — TELEPHONE ENCOUNTER
Lets have her start taking chlorthalidone 12.5 mg daily.  Please have them check her blood pressure every day and follow-up with them in a week to see what her blood pressures been.  Thank you, LH

## 2023-01-25 NOTE — TELEPHONE ENCOUNTER
Phone Number Called: [phone] & Language Line - Polish     Call outcome: Spoke to patient regarding message below.     Message: Called to inform patient of  recommendations. (start taking chlorthalidone 12.5 mg daily.  Please have them check her blood pressure every day and follow-up with them in a week to see what her blood pressures been.)      Patient would like me to follow up with her son to discuss medications at this time. Patient stated she does not know what medications she is taking.   Called [phone]-   Son was also unsure with all the medications.   I asked the son to write a list and give me a call back to discuss further medications and dose before taking this new medication.

## 2023-01-25 NOTE — TELEPHONE ENCOUNTER
MALGORZATA Ortega.  You 3 minutes ago (11:41 AM)     EH  And we just verify that she is taking all of her prescribed medications for her blood pressure?  If she is taking them all as prescribed every day, then we will have to add in another agent, but I want to make sure that she is following her regimen as prescribed right now before adding anything else in.     thanks, LH

## 2023-01-25 NOTE — TELEPHONE ENCOUNTER
MALGORZATA Ortega.  You 1 minute ago (3:22 PM)     EH  In that case lets see if they can come in for a visit in the next week or two. Thanks, LH      You  ANGELITA Ortega 28 minutes ago (2:56 PM)     KD  Please advise, seems to me patient and family are having a hard time with keeping medication regimen.

## 2023-01-25 NOTE — TELEPHONE ENCOUNTER
Phone Number Called: [phone] & Language Line - Slovak     Call outcome: Spoke to patient regarding message below.     Message: Called to discuss patients BP readings after medication change.   BP readings:  /55  HR 53  /60    LH-Please Advise on BP, medication was changed on the 18th.

## 2023-01-26 DIAGNOSIS — I50.31 ACUTE DIASTOLIC CHF (CONGESTIVE HEART FAILURE) (HCC): ICD-10-CM

## 2023-01-26 RX ORDER — FUROSEMIDE 20 MG/1
20 TABLET ORAL
Qty: 30 TABLET | Refills: 11 | OUTPATIENT
Start: 2023-01-26

## 2023-01-26 NOTE — TELEPHONE ENCOUNTER
Spoke w/ pt son and explained provider wants pt to come in to discuss medication. Pt son going to call back to schedule appt, needs to figure out what day will work best. Gave pt son -[phone] number. Pt should be scheduled with LH or BE per . /cg 01/26/23

## 2023-01-30 ENCOUNTER — HOSPITAL ENCOUNTER (OUTPATIENT)
Dept: LAB | Facility: MEDICAL CENTER | Age: 73
End: 2023-01-30
Attending: NURSE PRACTITIONER
Payer: MEDICARE

## 2023-01-30 DIAGNOSIS — I50.31 ACUTE DIASTOLIC CHF (CONGESTIVE HEART FAILURE) (HCC): ICD-10-CM

## 2023-01-30 LAB
ALBUMIN SERPL BCP-MCNC: 4.5 G/DL (ref 3.2–4.9)
ALBUMIN/GLOB SERPL: 2 G/DL
ALP SERPL-CCNC: 112 U/L (ref 30–99)
ALT SERPL-CCNC: 11 U/L (ref 2–50)
ANION GAP SERPL CALC-SCNC: 8 MMOL/L (ref 7–16)
ANION GAP SERPL CALC-SCNC: 9 MMOL/L (ref 7–16)
ANISOCYTOSIS BLD QL SMEAR: ABNORMAL
APPEARANCE UR: CLEAR
AST SERPL-CCNC: 18 U/L (ref 12–45)
BASOPHILS # BLD AUTO: 0 % (ref 0–1.8)
BASOPHILS # BLD: 0 K/UL (ref 0–0.12)
BILIRUB SERPL-MCNC: 0.3 MG/DL (ref 0.1–1.5)
BILIRUB UR QL STRIP.AUTO: NEGATIVE
BUN SERPL-MCNC: 25 MG/DL (ref 8–22)
BUN SERPL-MCNC: 25 MG/DL (ref 8–22)
CALCIUM ALBUM COR SERPL-MCNC: 9.6 MG/DL (ref 8.5–10.5)
CALCIUM SERPL-MCNC: 10 MG/DL (ref 8.4–10.2)
CALCIUM SERPL-MCNC: 9.9 MG/DL (ref 8.4–10.2)
CHLORIDE SERPL-SCNC: 105 MMOL/L (ref 96–112)
CHLORIDE SERPL-SCNC: 106 MMOL/L (ref 96–112)
CO2 SERPL-SCNC: 20 MMOL/L (ref 20–33)
CO2 SERPL-SCNC: 21 MMOL/L (ref 20–33)
COLOR UR: YELLOW
CREAT SERPL-MCNC: 1.12 MG/DL (ref 0.5–1.4)
CREAT SERPL-MCNC: 1.16 MG/DL (ref 0.5–1.4)
DACRYOCYTES BLD QL SMEAR: NORMAL
EOSINOPHIL # BLD AUTO: 0.01 K/UL (ref 0–0.51)
EOSINOPHIL NFR BLD: 1 % (ref 0–6.9)
ERYTHROCYTE [DISTWIDTH] IN BLOOD BY AUTOMATED COUNT: 48.6 FL (ref 35.9–50)
GFR SERPLBLD CREATININE-BSD FMLA CKD-EPI: 50 ML/MIN/1.73 M 2
GFR SERPLBLD CREATININE-BSD FMLA CKD-EPI: 52 ML/MIN/1.73 M 2
GLOBULIN SER CALC-MCNC: 2.3 G/DL (ref 1.9–3.5)
GLUCOSE SERPL-MCNC: 121 MG/DL (ref 65–99)
GLUCOSE SERPL-MCNC: 122 MG/DL (ref 65–99)
GLUCOSE UR STRIP.AUTO-MCNC: NEGATIVE MG/DL
HCT VFR BLD AUTO: 35.5 % (ref 37–47)
HGB BLD-MCNC: 11 G/DL (ref 12–16)
KETONES UR STRIP.AUTO-MCNC: NEGATIVE MG/DL
LEUKOCYTE ESTERASE UR QL STRIP.AUTO: NEGATIVE
LYMPHOCYTES # BLD AUTO: 0.4 K/UL (ref 1–4.8)
LYMPHOCYTES NFR BLD: 33 % (ref 22–41)
MANUAL DIFF BLD: NORMAL
MCH RBC QN AUTO: 29.8 PG (ref 27–33)
MCHC RBC AUTO-ENTMCNC: 31 G/DL (ref 33.6–35)
MCV RBC AUTO: 96.2 FL (ref 81.4–97.8)
MICRO URNS: NORMAL
MONOCYTES # BLD AUTO: 0.13 K/UL (ref 0–0.85)
MONOCYTES NFR BLD AUTO: 11 % (ref 0–13.4)
NEUTROPHILS # BLD AUTO: 0.66 K/UL (ref 2–7.15)
NEUTROPHILS NFR BLD: 55 % (ref 44–72)
NITRITE UR QL STRIP.AUTO: NEGATIVE
NRBC # BLD AUTO: 0 K/UL
NRBC BLD-RTO: 0 /100 WBC
OVALOCYTES BLD QL SMEAR: NORMAL
PH UR STRIP.AUTO: 6 [PH] (ref 5–8)
PLATELET # BLD AUTO: 137 K/UL (ref 164–446)
PLATELET BLD QL SMEAR: NORMAL
PMV BLD AUTO: 12.5 FL (ref 9–12.9)
POTASSIUM SERPL-SCNC: 5.9 MMOL/L (ref 3.6–5.5)
POTASSIUM SERPL-SCNC: 6 MMOL/L (ref 3.6–5.5)
PROT SERPL-MCNC: 6.8 G/DL (ref 6–8.2)
PROT UR QL STRIP: NEGATIVE MG/DL
RBC # BLD AUTO: 3.69 M/UL (ref 4.2–5.4)
RBC BLD AUTO: PRESENT
RBC UR QL AUTO: NEGATIVE
SODIUM SERPL-SCNC: 134 MMOL/L (ref 135–145)
SODIUM SERPL-SCNC: 135 MMOL/L (ref 135–145)
SP GR UR STRIP.AUTO: 1.01
WBC # BLD AUTO: 1.2 K/UL (ref 4.8–10.8)

## 2023-01-30 PROCEDURE — 80197 ASSAY OF TACROLIMUS: CPT

## 2023-01-30 PROCEDURE — 85007 BL SMEAR W/DIFF WBC COUNT: CPT

## 2023-01-30 PROCEDURE — 85025 COMPLETE CBC W/AUTO DIFF WBC: CPT

## 2023-01-30 PROCEDURE — 80053 COMPREHEN METABOLIC PANEL: CPT

## 2023-01-30 PROCEDURE — 87799 DETECT AGENT NOS DNA QUANT: CPT | Mod: 91

## 2023-01-30 PROCEDURE — 81003 URINALYSIS AUTO W/O SCOPE: CPT

## 2023-01-30 PROCEDURE — 36415 COLL VENOUS BLD VENIPUNCTURE: CPT

## 2023-01-30 PROCEDURE — 80048 BASIC METABOLIC PNL TOTAL CA: CPT

## 2023-01-31 ENCOUNTER — TELEPHONE (OUTPATIENT)
Dept: CARDIOLOGY | Facility: MEDICAL CENTER | Age: 73
End: 2023-01-31
Payer: MEDICARE

## 2023-01-31 VITALS — DIASTOLIC BLOOD PRESSURE: 83 MMHG | SYSTOLIC BLOOD PRESSURE: 178 MMHG

## 2023-01-31 NOTE — TELEPHONE ENCOUNTER
----- Message from ANGELITA Keen sent at 1/31/2023  8:43 AM PST -----  Regarding: check on BP readings and enter into flowsheet  Please enter updated BP reading into flowsheet for this week. If not controlled, please let me know. SC

## 2023-01-31 NOTE — TELEPHONE ENCOUNTER
Phone Number Called: [phone]    Call outcome:  Called and spoke with patients son    Message: Called to discuss BP Readings. Son unable to provide readings. Asked for us to call patient using Language line.     ---------------------------------------------------------  Phone Number Called: [phone] & Language Line-Malian    Call outcome: Spoke to patient regarding message below.    Message: Called to discuss BP readings.   178/83 this morning.  Patient only taking Losartan 100mg. Patient is unsure what other medications she should be taking.   Patient said her blood pressure goes up and down.   Unable to provide a list of medications she is taking.     SC-Please advise, patient has an appointment with you 2/9/23 to follow up with medication regimen.

## 2023-02-01 LAB
BKV DNA # SPEC NAA+PROBE: ABNORMAL {COPIES}/ML
BKV DNA # UR NAA+PROBE: ABNORMAL
BKV DNA SPEC NAA+PROBE-LOG#: 3.6 LOG
BKV DNA SPEC NAA+PROBE-LOG#: 7.9 LOG CPY/ML
DIAGNOSTIC IMP SPEC-IMP: DETECTED
DIAGNOSTIC IMP SPEC-IMP: DETECTED
TACROLIMUS BLD-MCNC: 11.3 NG/ML

## 2023-02-01 NOTE — RESULT ENCOUNTER NOTE
Called and left VM with patient and patients Son regarding high potassium level. Recommended reaching out to nephrology with these results.

## 2023-02-06 ENCOUNTER — NON-PROVIDER VISIT (OUTPATIENT)
Dept: CARDIOLOGY | Facility: MEDICAL CENTER | Age: 73
End: 2023-02-06
Attending: NURSE PRACTITIONER
Payer: MEDICARE

## 2023-02-06 ENCOUNTER — ANTICOAGULATION VISIT (OUTPATIENT)
Dept: VASCULAR LAB | Facility: MEDICAL CENTER | Age: 73
End: 2023-02-06
Attending: INTERNAL MEDICINE
Payer: MEDICARE

## 2023-02-06 VITALS — HEART RATE: 58 BPM | DIASTOLIC BLOOD PRESSURE: 63 MMHG | SYSTOLIC BLOOD PRESSURE: 138 MMHG

## 2023-02-06 DIAGNOSIS — D68.69 SECONDARY HYPERCOAGULABLE STATE (HCC): ICD-10-CM

## 2023-02-06 DIAGNOSIS — I49.1 APC (ATRIAL PREMATURE CONTRACTIONS): ICD-10-CM

## 2023-02-06 DIAGNOSIS — I47.10 SVT (SUPRAVENTRICULAR TACHYCARDIA) (HCC): ICD-10-CM

## 2023-02-06 DIAGNOSIS — I48.0 PAROXYSMAL ATRIAL FIBRILLATION (HCC): ICD-10-CM

## 2023-02-06 DIAGNOSIS — I49.3 PVC'S (PREMATURE VENTRICULAR CONTRACTIONS): ICD-10-CM

## 2023-02-06 DIAGNOSIS — I48.0 PAROXYSMAL A-FIB (HCC): ICD-10-CM

## 2023-02-06 PROCEDURE — 99212 OFFICE O/P EST SF 10 MIN: CPT | Performed by: NURSE PRACTITIONER

## 2023-02-06 RX ORDER — ASPIRIN 81 MG/1
81 TABLET, CHEWABLE ORAL DAILY
COMMUNITY
End: 2023-02-09

## 2023-02-06 NOTE — Clinical Note
Iker Santiago, I saw this pt in the [facility] clinic today. Her most recent creatinine is 1.12. She qualifies for Eliquis 5 mg BID based on cr, wt, age. Would you like me to increase her dose? Also, she's under the impression she should be on asa 81 mg per her kidney transplant team so she's continuing for now. Thank you, Ana Luisa

## 2023-02-07 ENCOUNTER — HOSPITAL ENCOUNTER (OUTPATIENT)
Dept: LAB | Facility: MEDICAL CENTER | Age: 73
End: 2023-02-07
Attending: INTERNAL MEDICINE
Payer: MEDICARE

## 2023-02-07 LAB
ANION GAP SERPL CALC-SCNC: 8 MMOL/L (ref 7–16)
ANISOCYTOSIS BLD QL SMEAR: ABNORMAL
BASOPHILS # BLD AUTO: 2 % (ref 0–1.8)
BASOPHILS # BLD: 0.03 K/UL (ref 0–0.12)
BUN SERPL-MCNC: 32 MG/DL (ref 8–22)
CALCIUM SERPL-MCNC: 9.5 MG/DL (ref 8.4–10.2)
CHLORIDE SERPL-SCNC: 108 MMOL/L (ref 96–112)
CO2 SERPL-SCNC: 23 MMOL/L (ref 20–33)
CREAT SERPL-MCNC: 1.23 MG/DL (ref 0.5–1.4)
EOSINOPHIL # BLD AUTO: 0.02 K/UL (ref 0–0.51)
EOSINOPHIL NFR BLD: 1 % (ref 0–6.9)
ERYTHROCYTE [DISTWIDTH] IN BLOOD BY AUTOMATED COUNT: 48.5 FL (ref 35.9–50)
FASTING STATUS PATIENT QL REPORTED: NORMAL
GFR SERPLBLD CREATININE-BSD FMLA CKD-EPI: 46 ML/MIN/1.73 M 2
GLUCOSE SERPL-MCNC: 105 MG/DL (ref 65–99)
HCT VFR BLD AUTO: 36.2 % (ref 37–47)
HGB BLD-MCNC: 11.3 G/DL (ref 12–16)
LYMPHOCYTES # BLD AUTO: 0.64 K/UL (ref 1–4.8)
LYMPHOCYTES NFR BLD: 40 % (ref 22–41)
MANUAL DIFF BLD: NORMAL
MCH RBC QN AUTO: 30.2 PG (ref 27–33)
MCHC RBC AUTO-ENTMCNC: 31.2 G/DL (ref 33.6–35)
MCV RBC AUTO: 96.8 FL (ref 81.4–97.8)
MONOCYTES # BLD AUTO: 0.18 K/UL (ref 0–0.85)
MONOCYTES NFR BLD AUTO: 11 % (ref 0–13.4)
NEUTROPHILS # BLD AUTO: 0.74 K/UL (ref 2–7.15)
NEUTROPHILS NFR BLD: 46 % (ref 44–72)
NRBC # BLD AUTO: 0 K/UL
NRBC BLD-RTO: 0 /100 WBC
OVALOCYTES BLD QL SMEAR: NORMAL
PHOSPHATE SERPL-MCNC: 2.7 MG/DL (ref 2.5–4.5)
PLATELET # BLD AUTO: 114 K/UL (ref 164–446)
PLATELET BLD QL SMEAR: NORMAL
PMV BLD AUTO: 11.4 FL (ref 9–12.9)
POTASSIUM SERPL-SCNC: 5.5 MMOL/L (ref 3.6–5.5)
RBC # BLD AUTO: 3.74 M/UL (ref 4.2–5.4)
RBC BLD AUTO: PRESENT
SODIUM SERPL-SCNC: 139 MMOL/L (ref 135–145)
WBC # BLD AUTO: 1.6 K/UL (ref 4.8–10.8)

## 2023-02-07 PROCEDURE — 85007 BL SMEAR W/DIFF WBC COUNT: CPT

## 2023-02-07 PROCEDURE — 85025 COMPLETE CBC W/AUTO DIFF WBC: CPT

## 2023-02-07 PROCEDURE — 80197 ASSAY OF TACROLIMUS: CPT

## 2023-02-07 PROCEDURE — 84100 ASSAY OF PHOSPHORUS: CPT

## 2023-02-07 PROCEDURE — 80048 BASIC METABOLIC PNL TOTAL CA: CPT

## 2023-02-07 PROCEDURE — 36415 COLL VENOUS BLD VENIPUNCTURE: CPT

## 2023-02-07 NOTE — PROGRESS NOTES
S/W son Manoj inquiring on monitor, advised him of instructions for return. He stated he will phone his brother to see where monitor is 0  3/21/23      Patient enrolled in the 3 day [facility] heart monitoring program, per MALGORZATA Harp.  >In clinic hook up, with a family member translating. Monitor S/N [id number].  >Baseline Transmitted.  >Pending EOS.

## 2023-02-07 NOTE — PROGRESS NOTES
Diagnosis: AF  Drug: Eliquis 2.5 mg BID (low dose per cardiology)  LOT: Indefinite  CHADSVASC: 5  HAS-BLED: 2-3 (age, asa, ckd s/p transplant/chronically low H/h, platelets)    Health Status Since Last Assessment  She is doing well on Eliquis. Taking 2.5 mg BID as instructed. Has not missed doses. Had her kidney transplant a couple of months ago. Her medication list was updated today. She is on aspirin 81 mg daily. She was hospitalized last month for CP. Today she had a cardiac monitor placed.   No problems with bleeding or excessive bruising.    Adherence with DOAC Therapy  0 missed dose(s) aside from stopping before procedures    BLEEDING RISK ASSESSMENT:    Bleeding Risk Assessment  Severe epistaxis - no   Hemoptysis - no  Excessive or unusual bruising / hematomas - no  GIB/melena/BRBPR/hematemesis - no  Hematuria - no   Abnormal vaginal bleeding - no  Concerning daily headache or subdural hematoma symptoms - no  Decreasing hemoglobin or new anemia - chronically low hgb  Falls, presyncope, syncope, or seizures - no  Platelets: 137 (121 prior)  Latest hemoglobin:     Lab Results   Component Value Date/Time    WBC 1.2 (LL) 01/30/2023 08:55 AM    RBC 3.69 (L) 01/30/2023 08:55 AM    HEMOGLOBIN 11.0 (L) 01/30/2023 08:55 AM    HEMATOCRIT 35.5 (L) 01/30/2023 08:55 AM    MCV 96.2 01/30/2023 08:55 AM    MCH 29.8 01/30/2023 08:55 AM    MCHC 31.0 (L) 01/30/2023 08:55 AM    MPV 12.5 01/30/2023 08:55 AM    NEUTSPOLYS 55.00 01/30/2023 08:55 AM    LYMPHOCYTES 33.00 01/30/2023 08:55 AM    MONOCYTES 11.00 01/30/2023 08:55 AM    EOSINOPHILS 1.00 01/30/2023 08:55 AM    BASOPHILS 0.00 01/30/2023 08:55 AM    ANISOCYTOSIS 1+ 01/30/2023 08:55 AM        HAS-BLED:  Hypertension (uncontrolled, >160 mmHg systolic) - no  Renal disease (dialysis, transplant, Cr >2.26 mg/dL or >200 µmol/L) - not since translplant  Liver disease (cirrhosis or bilirubin >2x normal with AST/ALT/AP >3x normal) - no  Stroke history - no  Prior major bleeding or  predisposition to bleeding - somewhat yes  Labile INR Unstable/high INRs, time in therapeutic range <60% - no  Age >65 - yes  Medication usage predisposing to bleeding (aspirin, clopidogrel, NSAIDs) - yes  Alcohol use  ?8 drinks/week - no      Creatinine Clearance/Renal Function  Latest eGFR / creatinine:  Lab Results   Component Value Date/Time    SODIUM 134 (L) 01/30/2023 08:55 AM    POTASSIUM 5.9 (H) 01/30/2023 08:55 AM    CHLORIDE 105 01/30/2023 08:55 AM    CO2 20 01/30/2023 08:55 AM    GLUCOSE 121 (H) 01/30/2023 08:55 AM    BUN 25 (H) 01/30/2023 08:55 AM    CREATININE 1.12 01/30/2023 08:55 AM    BUNCREATRAT 8 (L) 01/28/2021 07:00 AM       Is eGFR less than 50ml/min  ~52  Cr cl 42 ml/min    If YES, calculate CrCl (see back)  Any recent dehydrating illness or medications added/changed? i.e. Diuretics      Drug Interactions  ASA/antiplatelets - asa 81 mg; pt's granddaughter states she is on asa 81 mg per her kidney transplant doctor  NSAID - no  Other drug interactions - antiplatelet (Review med list / OTCs;)  Current Outpatient Medications on File Prior to Visit   Medication Sig Dispense Refill    chlorthalidone (HYGROTON) 25 MG Tab Take 0.5 Tablets by mouth every day. 45 Tablet 3    gabapentin (NEURONTIN) 100 MG Cap Take 1 Capsule by mouth every evening. 90 Capsule 3    insulin glargine (LANTUS SOLOSTAR) 100 UNIT/ML Solution Pen-injector injection Inject 5 Units under the skin every day.      pantoprazole (PROTONIX) 40 MG Tablet Delayed Response       amLODIPine (NORVASC) 10 MG Tab Take 10 mg by mouth every day.      losartan (COZAAR) 100 MG Tab Take 1 Tablet by mouth every evening. 90 Tablet 3    amLODIPine (NORVASC) 10 MG Tab Take 10 mg by mouth every day.      atorvastatin (LIPITOR) 20 MG Tab Take 1 Tablet by mouth every evening.      mycophenolate (CELLCEPT) 500 MG tablet Take 1 Tablet by mouth 2 times a day.      levothyroxine (SYNTHROID) 50 MCG Tab Take 1 Tablet by mouth every morning on an empty stomach.  90 Tablet 4    glucose blood (ONETOUCH VERIO) strip 1 Strip by Other route as needed (on insulin checking 3-4 times a day). 150 Strip 6    Blood Glucose Monitoring Suppl (ONE TOUCH ULTRA 2) w/Device Kit 1 DEVICE 3 TIMES A DAY BEFORE MEALS. 1 Kit 0    carvedilol (COREG) 6.25 MG Tab Take 1 Tablet by mouth 2 times a day. 200 Tablet 3    atorvastatin (LIPITOR) 20 MG Tab Take 1 Tablet by mouth every evening. 100 Tablet 3    furosemide (LASIX) 20 MG Tab Take 1 Tablet by mouth 1 time a day as needed (leg swelling). 30 Tablet 11    magnesium oxide 400 (240 Mg) MG Tab Take 1 Tablet by mouth every day. 10 Tablet 0    omeprazole (PRILOSEC) 20 MG delayed-release capsule Take 1 Capsule by mouth every day. 30 Capsule 0    docusate sodium (COLACE) 100 MG Cap Take 100-200 mg by mouth 2 times a day as needed for Constipation. Indications: Constipation      Lancets Use one Freestyle Pearl lancet to test blood sugar once daily . 300 Each 3    sodium bicarbonate (SODIUM BICARBONATE) 650 MG Tab Take 2 Tablets by mouth in the morning, at noon, and at bedtime. 120 Tablet 3    valGANciclovir (VALCYTE) 450 MG tablet Take 1 Tablet by mouth every day.      fluticasone (FLONASE) 50 MCG/ACT nasal spray ADMINISTER 1 SPRAY INTO AFFECTED NOSTRIL(S) EVERY DAY. 16 mL 1    insulin glargine 100 UNIT/ML SC SOLN Inject 8 Units under the skin every day.      insulin aspart (NOVOLOG FLEXPEN) 100 UNIT/ML injection PEN Inject 2-8 Units under the skin 3 times a day before meals. Sliding Scale      predniSONE (DELTASONE) 5 MG Tab Take 2 Tablets by mouth every day.      clotrimazole (MYCELEX) 10 MG Thanh thanh Take 1 Thanh by mouth 4 times a day.      mycophenolate (CELLCEPT) 500 MG tablet Take 2 Tablets by mouth 2 times a day.      sulfamethoxazole-trimethoprim (BACTRIM) 400-80 MG Tab Take 1 Tablet by mouth every day. Pt started on 11/1/2022 for 3 month course, per pts son      tacrolimus (PROGRAF) 1 MG Cap Take 1 Capsule by mouth 2 times a day.       apixaban (ELIQUIS) 2.5mg Tab Take 1 Tablet by mouth 2 times a day. Indications: Thromboembolism secondary to Atrial Fibrillation 180 Tablet 3     No current facility-administered medications on file prior to visit.     Verified no anticonvulsant or azole therapy, education provided for future use.      Examination  Blood pressure:  138/63   Elevated BP - no (sBP greater than 160 mmHg)   Symptomatic hypotension - denies  Significant gait impairment / imbalance / high risk for falls - no obvious risks aside from age    Final Assessment and Recommendations:  Patient appears stable from the anticoagulation standpoint  Benefits of continued DOAC therapy outweigh risks for this patient  Recommend continue current DOAC at same dose for now.  Most recent creatinine 1.12. Based on wt (58), age and cr she now qualifies for Eliquis 5 mg BID. Will reach out to cardiology to discuss.   Pt states she is to continue aspirin per her renal transplant team. Will need to clarify.  Continue to follow labs.      - Continue Eliquis 2.5 mg by mouth twice daily    Other Actions:   The rationale for continued DOAC therapy  The potential for minor, major or life-threatening bleeding  Dosing instructions, adherence, risks of non-adherence, handling missed doses  Avoiding OTC ASA & NSAIDs & minimizing EtOH to reduce bleeding risks  Dosing around upcoming procedure / surgery if applicable (see back)    Follow up:  Will follow up with patient in 6 months per pt's request due to multiple appointments.      Ana Luisa Garcia APRN    Addendum: Reviewed Eliquis dose with Pamela Prieto from cardiology. Given creatinine is normal s/p kidney transplant (1.23), pt now qualifies for Eliquis 5 mg BID. Spoke with pt's daughter by phone to let her know. Pt will finish her current supply of 2.5 mg tablets but will take 2 tablets in the AM and PM until she picks up her 5 mg tablets. Also, pt doesn't need asa from a cardiac perspective but is to continue taking  if her kidney transplant team wants her to continue.

## 2023-02-08 LAB — TACROLIMUS BLD-MCNC: 4.8 NG/ML

## 2023-02-09 ENCOUNTER — OFFICE VISIT (OUTPATIENT)
Dept: CARDIOLOGY | Facility: MEDICAL CENTER | Age: 73
End: 2023-02-09
Payer: MEDICARE

## 2023-02-09 VITALS
WEIGHT: 126.7 LBS | BODY MASS INDEX: 21.63 KG/M2 | HEART RATE: 64 BPM | SYSTOLIC BLOOD PRESSURE: 140 MMHG | OXYGEN SATURATION: 98 % | HEIGHT: 64 IN | DIASTOLIC BLOOD PRESSURE: 54 MMHG | RESPIRATION RATE: 18 BRPM

## 2023-02-09 DIAGNOSIS — I15.0 RENOVASCULAR HYPERTENSION: ICD-10-CM

## 2023-02-09 DIAGNOSIS — N18.6 CONTROLLED TYPE 2 DIABETES MELLITUS WITH CHRONIC KIDNEY DISEASE ON CHRONIC DIALYSIS, WITH LONG-TERM CURRENT USE OF INSULIN (HCC): ICD-10-CM

## 2023-02-09 DIAGNOSIS — Z99.2 CONTROLLED TYPE 2 DIABETES MELLITUS WITH CHRONIC KIDNEY DISEASE ON CHRONIC DIALYSIS, WITH LONG-TERM CURRENT USE OF INSULIN (HCC): ICD-10-CM

## 2023-02-09 DIAGNOSIS — Z79.01 CHRONIC ANTICOAGULATION: ICD-10-CM

## 2023-02-09 DIAGNOSIS — Z94.0 KIDNEY TRANSPLANT RECIPIENT: ICD-10-CM

## 2023-02-09 DIAGNOSIS — I25.111 CORONARY ARTERY DISEASE INVOLVING NATIVE CORONARY ARTERY OF NATIVE HEART WITH ANGINA PECTORIS WITH DOCUMENTED SPASM (HCC): ICD-10-CM

## 2023-02-09 DIAGNOSIS — E78.2 MIXED HYPERLIPIDEMIA: ICD-10-CM

## 2023-02-09 DIAGNOSIS — E11.22 CONTROLLED TYPE 2 DIABETES MELLITUS WITH CHRONIC KIDNEY DISEASE ON CHRONIC DIALYSIS, WITH LONG-TERM CURRENT USE OF INSULIN (HCC): ICD-10-CM

## 2023-02-09 DIAGNOSIS — I48.0 PAROXYSMAL A-FIB (HCC): ICD-10-CM

## 2023-02-09 DIAGNOSIS — Z79.4 CONTROLLED TYPE 2 DIABETES MELLITUS WITH CHRONIC KIDNEY DISEASE ON CHRONIC DIALYSIS, WITH LONG-TERM CURRENT USE OF INSULIN (HCC): ICD-10-CM

## 2023-02-09 DIAGNOSIS — Z79.4 LONG-TERM INSULIN USE (HCC): ICD-10-CM

## 2023-02-09 DIAGNOSIS — I50.31 ACUTE DIASTOLIC CHF (CONGESTIVE HEART FAILURE) (HCC): ICD-10-CM

## 2023-02-09 PROCEDURE — 99214 OFFICE O/P EST MOD 30 MIN: CPT | Performed by: NURSE PRACTITIONER

## 2023-02-09 RX ORDER — ASPIRIN 81 MG/1
81 TABLET, COATED ORAL DAILY
COMMUNITY
Start: 2023-01-18 | End: 2023-02-15 | Stop reason: SDUPTHER

## 2023-02-09 RX ORDER — LOSARTAN POTASSIUM 100 MG/1
100 TABLET ORAL DAILY
COMMUNITY
Start: 2023-01-23 | End: 2023-02-09

## 2023-02-09 RX ORDER — PEN NEEDLE, DIABETIC 32GX 5/32"
NEEDLE, DISPOSABLE MISCELLANEOUS
COMMUNITY
Start: 2023-01-20 | End: 2023-09-04

## 2023-02-09 RX ORDER — DOXAZOSIN MESYLATE 1 MG/1
1 TABLET ORAL NIGHTLY
Qty: 30 TABLET | Refills: 11 | Status: ON HOLD | OUTPATIENT
Start: 2023-02-09 | End: 2023-04-08

## 2023-02-09 ASSESSMENT — ENCOUNTER SYMPTOMS
COUGH: 0
FEVER: 0
ABDOMINAL PAIN: 0
ORTHOPNEA: 0
MYALGIAS: 0
PND: 0
CLAUDICATION: 0
DIZZINESS: 0
SHORTNESS OF BREATH: 0
PALPITATIONS: 0

## 2023-02-09 ASSESSMENT — FIBROSIS 4 INDEX: FIB4 SCORE: 3.48

## 2023-02-10 NOTE — PROGRESS NOTES
Chief Complaint   Patient presents with    Hypertension     F/V Dx: Essential hypertension    CHF (Diastolic)     F/V Dx: Acute diastolic CHF (congestive heart failure) ([facility])      Coronary Artery Disease     F/V Dx: Coronary artery disease with angina pectoris with documented spasm ([facility])       Subjective     Tere Gallegos is a 72 y.o. female who presents today for BP follow up.    She is a patient of Leidy CHAPA and Dr. Hernandez in our office. Hx of HLD with CAD with prior PCI, chronic anemia, IDDM with renal transplant with prior ESRD, HTN, PAF on chronic anticoagulation, and diastolic heart failure.    She presents today with her daughter and grand daughter (who is translating for her due to  services being unavailable).    She states she has generalized fatigue and is confused about which medications she is taking. Her granddaughter states she doesn't know her medication and they didn't bring the bottles in today.    She had some acute left sided chest pain with radiation to her left arm.    She has no other cardiac symptoms to report.    She has no shortness of breath, edema, dizziness/lightheadedness, or palpitations.    Past Medical History:   Diagnosis Date    Acquired hypothyroidism 05/04/2020    CAD (coronary artery disease)     Chronic diastolic heart failure (HCC) 05/04/2020    Coronary artery disease due to lipid rich plaque     2 Synergy NOEMI to 100% RCA stent placed    Dental disorder     partial dentures- uppers    Diabetes ([facility])     oral medication    ESRD (end stage renal disease) on dialysis ([facility]) 05/04/2020    Hemodialysis patient ([facility])     M, W, F    Hyperlipidemia     Hypertension     Kidney transplant candidate     Kidney transplant recipient 10/31/2022    Presence of drug-eluting stent in right coronary artery     QT prolongation 01/22/2020    RLS (restless legs syndrome) 08/05/2016    Transaminitis 12/22/2018     Past Surgical History:   Procedure Laterality Date     OTHER ABDOMINAL SURGERY Right 10/31/2022     Donor kidney transplant - [facility]    ZZZ CARDIAC CATH  2016    RCA stented with 2 Synergy drug-eluting stents.    RECOVERY  2016    Procedure: CATH LAB [facility] WITH POSSIBLE DR. CASTILLO;  Surgeon: Recoveryonly Surgery;  Location: SURGERY PRE-POST PROC UNIT [facility];  Service:     ZZZ CARDIAC CATH  2016    100% RCA    OTHER Left 2014    left arm upper extremity fistula    OTHER ABDOMINAL SURGERY      left kidney removed due to cancer     Family History   Problem Relation Age of Onset    Diabetes Sister     Other Sister         liver disease    Diabetes Brother     Heart Disease Neg Hx      Social History     Socioeconomic History    Marital status:      Spouse name: Not on file    Number of children: Not on file    Years of education: Not on file    Highest education level: Not on file   Occupational History    Not on file   Tobacco Use    Smoking status: Never    Smokeless tobacco: Never   Vaping Use    Vaping Use: Never used   Substance and Sexual Activity    Alcohol use: No     Alcohol/week: 0.0 oz    Drug use: No    Sexual activity: Never   Other Topics Concern    Not on file   Social History Narrative    Not on file     Social Determinants of Health     Financial Resource Strain: Not on file   Food Insecurity: Not on file   Transportation Needs: Not on file   Physical Activity: Not on file   Stress: Not on file   Social Connections: Not on file   Intimate Partner Violence: Not on file   Housing Stability: Not on file     No Known Allergies  Outpatient Encounter Medications as of 2023   Medication Sig Dispense Refill    doxazosin (CARDURA) 1 MG Tab Take 1 Tablet by mouth every evening. 30 Tablet 11    losartan (COZAAR) 100 MG Tab Take 1 Tablet by mouth every evening. 90 Tablet 3    ASPIRIN LOW DOSE 81 MG EC tablet Take 81 mg by mouth every day.      BD PEN NEEDLE PETE 2ND GEN USE AS DIRECTED WITH INSULIN PENS THREE  TIMES DAILY BEFORE A MEAL      [DISCONTINUED] losartan (COZAAR) 100 MG Tab Take 100 mg by mouth every day. (Patient not taking: Reported on 2/9/2023)      [DISCONTINUED] aspirin (ASA) 81 MG Chew Tab chewable tablet Chew 81 mg every day. (Patient not taking: Reported on 2/9/2023)      chlorthalidone (HYGROTON) 25 MG Tab Take 0.5 Tablets by mouth every day. 45 Tablet 3    gabapentin (NEURONTIN) 100 MG Cap Take 1 Capsule by mouth every evening. 90 Capsule 3    insulin glargine (LANTUS SOLOSTAR) 100 UNIT/ML Solution Pen-injector injection Inject 5 Units under the skin every day.      amLODIPine (NORVASC) 10 MG Tab Take 10 mg by mouth every day.      [DISCONTINUED] amLODIPine (NORVASC) 10 MG Tab Take 10 mg by mouth every day. (Patient not taking: Reported on 2/9/2023)      [DISCONTINUED] atorvastatin (LIPITOR) 20 MG Tab Take 1 Tablet by mouth every evening. (Patient not taking: Reported on 2/9/2023)      [DISCONTINUED] mycophenolate (CELLCEPT) 500 MG tablet Take 1 Tablet by mouth 2 times a day. (Patient not taking: Reported on 2/9/2023)      levothyroxine (SYNTHROID) 50 MCG Tab Take 1 Tablet by mouth every morning on an empty stomach. 90 Tablet 4    glucose blood (ONETOUCH VERIO) strip 1 Strip by Other route as needed (on insulin checking 3-4 times a day). 150 Strip 6    Blood Glucose Monitoring Suppl (ONE TOUCH ULTRA 2) w/Device Kit 1 DEVICE 3 TIMES A DAY BEFORE MEALS. 1 Kit 0    carvedilol (COREG) 6.25 MG Tab Take 1 Tablet by mouth 2 times a day. 200 Tablet 3    atorvastatin (LIPITOR) 20 MG Tab Take 1 Tablet by mouth every evening. 100 Tablet 3    furosemide (LASIX) 20 MG Tab Take 1 Tablet by mouth 1 time a day as needed (leg swelling). 30 Tablet 11    magnesium oxide 400 (240 Mg) MG Tab Take 1 Tablet by mouth every day. 10 Tablet 0    omeprazole (PRILOSEC) 20 MG delayed-release capsule Take 1 Capsule by mouth every day. 30 Capsule 0    docusate sodium (COLACE) 100 MG Cap Take 100-200 mg by mouth 2 times a day as  "needed for Constipation. Indications: Constipation      Lancets Use one Freestyle Pearl lancet to test blood sugar once daily . 300 Each 3    sodium bicarbonate (SODIUM BICARBONATE) 650 MG Tab Take 2 Tablets by mouth in the morning, at noon, and at bedtime. 120 Tablet 3    fluticasone (FLONASE) 50 MCG/ACT nasal spray ADMINISTER 1 SPRAY INTO AFFECTED NOSTRIL(S) EVERY DAY. 16 mL 1    insulin glargine 100 UNIT/ML SC SOLN Inject 8 Units under the skin every day.      insulin aspart (NOVOLOG FLEXPEN) 100 UNIT/ML injection PEN Inject 2-8 Units under the skin 3 times a day before meals. Sliding Scale      predniSONE (DELTASONE) 5 MG Tab Take 2 Tablets by mouth every day.      mycophenolate (CELLCEPT) 500 MG tablet Take 2 Tablets by mouth 2 times a day.      tacrolimus (PROGRAF) 1 MG Cap Take 1 Capsule by mouth 2 times a day.      [DISCONTINUED] apixaban (ELIQUIS) 2.5mg Tab Take 1 Tablet by mouth 2 times a day. Indications: Thromboembolism secondary to Atrial Fibrillation 180 Tablet 3     No facility-administered encounter medications on file as of 2/9/2023.     Review of Systems   Constitutional:  Positive for malaise/fatigue. Negative for fever.        Generalized fatigue    Respiratory:  Negative for cough and shortness of breath.    Cardiovascular:  Positive for chest pain. Negative for palpitations, orthopnea, claudication, leg swelling and PND.        L sided chest pain that radiated to her left arm on Saturday, lasted three hours, went away with tylenol   Gastrointestinal:  Negative for abdominal pain.   Musculoskeletal:  Negative for myalgias.   Neurological:  Negative for dizziness.            Objective     BP (!) 140/54 (BP Location: Left arm, Patient Position: Sitting, BP Cuff Size: Adult)   Pulse 64   Resp 18   Ht 1.626 m (5' 4\")   Wt 57.5 kg (126 lb 11.2 oz)   LMP  (LMP Unknown)   SpO2 98%   BMI 21.75 kg/m²     Physical Exam  Vitals and nursing note reviewed.   Constitutional:       Appearance: Normal " appearance. She is well-developed and normal weight.   HENT:      Head: Normocephalic and atraumatic.   Neck:      Vascular: No JVD.   Cardiovascular:      Rate and Rhythm: Normal rate and regular rhythm.      Pulses: Normal pulses.      Heart sounds: Normal heart sounds.   Pulmonary:      Effort: Pulmonary effort is normal.      Breath sounds: Normal breath sounds.   Musculoskeletal:         General: Normal range of motion.   Skin:     General: Skin is warm and dry.      Capillary Refill: Capillary refill takes less than 2 seconds.   Neurological:      General: No focal deficit present.      Mental Status: She is alert and oriented to person, place, and time. Mental status is at baseline.   Psychiatric:         Mood and Affect: Mood normal.         Behavior: Behavior normal.         Thought Content: Thought content normal.         Judgment: Judgment normal.              Assessment & Plan     1. Acute diastolic CHF (congestive heart failure) ([facility])        2. Chronic anticoagulation        3. Controlled type 2 diabetes mellitus with chronic kidney disease on chronic dialysis, with long-term current use of insulin ([facility])        4. Coronary artery disease involving native coronary artery of native heart with angina pectoris with documented spasm ([facility])        5. Kidney transplant recipient        6. Long-term insulin use ([facility])        7. Mixed hyperlipidemia        8. Paroxysmal A-fib ([facility])        9. Renovascular hypertension          Medical Decision Making: Today's Assessment/Status/Plan:      1. HLD with CAD with prior PCI  -no angina or DONALDSON  -cont statin, OK to stop ASA with eliquis monotherapy  -follow labs annually, LDL goal <70    2. PAF on chronic anticoagulation  -no recurrence per patient  -increase eliquis to 5 mg BID  -cont coreg for rate control, make sure she is taking 6.25 mg BID  -follow up on heart monitor results    3. DM type II with insulin use and CKD  -managed by nephrology  -labs continue to show  persistent anemia, follow with PCP or nephrology    4. Diastolic heart failure  -stable post renal transplant  -recommend use lasix PRN for edema  -cont coreg, losartan  -follow symptoms  -EF at 60%    5. HTN  -uncontrolled due to not taking all her BP medications and medication confusion  -make apt next week to review medication bottles, discuss them with RN, and BP check with her home BP cuff  -cont 6.25 mg BID, losartan 100 mg QD, amlodipine 10 mg QD, and ADD doxazosin 1 mg QPM  -use lasix PRN  -BP goal <130/80  -check BMP in 1 month    Patient is to follow up with BP check in 1 week with bringing medications and BP cuff.

## 2023-02-13 ENCOUNTER — DOCUMENTATION (OUTPATIENT)
Dept: MEDICAL GROUP | Facility: PHYSICIAN GROUP | Age: 73
End: 2023-02-13
Payer: MEDICARE

## 2023-02-13 ENCOUNTER — HOSPITAL ENCOUNTER (OUTPATIENT)
Dept: LAB | Facility: MEDICAL CENTER | Age: 73
End: 2023-02-13
Attending: INTERNAL MEDICINE
Payer: MEDICARE

## 2023-02-13 LAB
ANION GAP SERPL CALC-SCNC: 8 MMOL/L (ref 7–16)
ANISOCYTOSIS BLD QL SMEAR: ABNORMAL
BASOPHILS # BLD AUTO: 2 % (ref 0–1.8)
BASOPHILS # BLD: 0.03 K/UL (ref 0–0.12)
BUN SERPL-MCNC: 22 MG/DL (ref 8–22)
CALCIUM SERPL-MCNC: 9.4 MG/DL (ref 8.4–10.2)
CHLORIDE SERPL-SCNC: 108 MMOL/L (ref 96–112)
CO2 SERPL-SCNC: 23 MMOL/L (ref 20–33)
CREAT SERPL-MCNC: 0.87 MG/DL (ref 0.5–1.4)
EOSINOPHIL # BLD AUTO: 0.1 K/UL (ref 0–0.51)
EOSINOPHIL NFR BLD: 6 % (ref 0–6.9)
ERYTHROCYTE [DISTWIDTH] IN BLOOD BY AUTOMATED COUNT: 46.7 FL (ref 35.9–50)
FASTING STATUS PATIENT QL REPORTED: NORMAL
GFR SERPLBLD CREATININE-BSD FMLA CKD-EPI: 70 ML/MIN/1.73 M 2
GLUCOSE SERPL-MCNC: 99 MG/DL (ref 65–99)
HCT VFR BLD AUTO: 34.1 % (ref 37–47)
HGB BLD-MCNC: 10.6 G/DL (ref 12–16)
LYMPHOCYTES # BLD AUTO: 0.53 K/UL (ref 1–4.8)
LYMPHOCYTES NFR BLD: 33 % (ref 22–41)
MANUAL DIFF BLD: NORMAL
MCH RBC QN AUTO: 29.9 PG (ref 27–33)
MCHC RBC AUTO-ENTMCNC: 31.1 G/DL (ref 33.6–35)
MCV RBC AUTO: 96.1 FL (ref 81.4–97.8)
METAMYELOCYTES NFR BLD MANUAL: 1 %
MICROCYTES BLD QL SMEAR: ABNORMAL
MONOCYTES # BLD AUTO: 0.34 K/UL (ref 0–0.85)
MONOCYTES NFR BLD AUTO: 21 % (ref 0–13.4)
NEUTROPHILS # BLD AUTO: 0.59 K/UL (ref 2–7.15)
NEUTROPHILS NFR BLD: 36 % (ref 44–72)
NEUTS BAND NFR BLD MANUAL: 1 % (ref 0–10)
NRBC # BLD AUTO: 0 K/UL
NRBC BLD-RTO: 0 /100 WBC
OVALOCYTES BLD QL SMEAR: NORMAL
PHOSPHATE SERPL-MCNC: 2.7 MG/DL (ref 2.5–4.5)
PLATELET # BLD AUTO: 98 K/UL (ref 164–446)
PLATELET BLD QL SMEAR: NORMAL
PMV BLD AUTO: 10.5 FL (ref 9–12.9)
POIKILOCYTOSIS BLD QL SMEAR: NORMAL
POTASSIUM SERPL-SCNC: 4 MMOL/L (ref 3.6–5.5)
RBC # BLD AUTO: 3.55 M/UL (ref 4.2–5.4)
RBC BLD AUTO: PRESENT
SODIUM SERPL-SCNC: 139 MMOL/L (ref 135–145)
WBC # BLD AUTO: 1.6 K/UL (ref 4.8–10.8)

## 2023-02-13 PROCEDURE — 80048 BASIC METABOLIC PNL TOTAL CA: CPT

## 2023-02-13 PROCEDURE — 36415 COLL VENOUS BLD VENIPUNCTURE: CPT

## 2023-02-13 PROCEDURE — 85007 BL SMEAR W/DIFF WBC COUNT: CPT

## 2023-02-13 PROCEDURE — 85025 COMPLETE CBC W/AUTO DIFF WBC: CPT

## 2023-02-13 PROCEDURE — 80197 ASSAY OF TACROLIMUS: CPT

## 2023-02-13 PROCEDURE — 84100 ASSAY OF PHOSPHORUS: CPT

## 2023-02-14 LAB — TACROLIMUS BLD-MCNC: 2.1 NG/ML

## 2023-02-14 NOTE — PROGRESS NOTES
Stacy page received @ 2006pm re:critical low WBC - 1.6    Patient is s/p kidney transplant 10/2022 (end stage renal disease secondary to diabetic nephropathy) on immunosuppression management at [facility]. Know history of pancytopenia since 2020 (inconclusive bone marrow biopsy), unclear etiology of pancytopenia per Heme/onc in 7/2022, felt to be stable.   Last visit with transplant team on 1/23/2023; currently on daily prednisone, tacrolimus, reduced cellcept dosing for the leukopenia. Noted that they will follow up with lab results.     - no call back to patient  - PCP cc'ed as FYI

## 2023-02-15 ENCOUNTER — TELEPHONE (OUTPATIENT)
Dept: CARDIOLOGY | Facility: MEDICAL CENTER | Age: 73
End: 2023-02-15

## 2023-02-15 ENCOUNTER — NON-PROVIDER VISIT (OUTPATIENT)
Dept: CARDIOLOGY | Facility: MEDICAL CENTER | Age: 73
End: 2023-02-15
Payer: MEDICARE

## 2023-02-15 VITALS — SYSTOLIC BLOOD PRESSURE: 152 MMHG | DIASTOLIC BLOOD PRESSURE: 52 MMHG

## 2023-02-15 DIAGNOSIS — Z79.01 CHRONIC ANTICOAGULATION: ICD-10-CM

## 2023-02-15 DIAGNOSIS — I10 ESSENTIAL HYPERTENSION: Primary | ICD-10-CM

## 2023-02-15 DIAGNOSIS — I50.31 ACUTE DIASTOLIC CHF (CONGESTIVE HEART FAILURE) (HCC): ICD-10-CM

## 2023-02-15 DIAGNOSIS — I10 ESSENTIAL HYPERTENSION: ICD-10-CM

## 2023-02-15 RX ORDER — FUROSEMIDE 20 MG/1
20 TABLET ORAL
Qty: 30 TABLET | Refills: 11 | Status: SHIPPED | OUTPATIENT
Start: 2023-02-15 | End: 2023-03-31

## 2023-02-15 RX ORDER — ASPIRIN 81 MG/1
81 TABLET, COATED ORAL DAILY
Qty: 100 TABLET | Refills: 3 | Status: ON HOLD | OUTPATIENT
Start: 2023-02-15 | End: 2023-02-20

## 2023-02-15 RX ORDER — CHLORTHALIDONE 25 MG/1
12.5 TABLET ORAL DAILY
Qty: 45 TABLET | Refills: 3 | Status: ON HOLD | OUTPATIENT
Start: 2023-02-15 | End: 2023-02-20

## 2023-02-15 NOTE — PROGRESS NOTES
Patient was here today for blood pressure check per SC. BP readings located in vital sign section.     Informed patient that I would go talk to the nurse, Jade ROBLES and let her know which medications are being taking and what the BP readings are.     Pt reports no active symptoms today but stated she did have chest pain radiating down her left arm on Sunday.     TRAVIS Hansen went in to talk to patient.

## 2023-02-15 NOTE — TELEPHONE ENCOUNTER
Bmp labs ordered.   Patient scheduled for 2/22/23 for another BP check in office.     ------------------------------------------------------------  ----- Message from ANGELITA Keen sent at 2/15/2023 11:45 AM PST -----  Thank you for helping her.    Check BMP in 2-4 weeks and repeat BP check in office with all medications please. SC  ----- Message -----  From: Maya Hewitt R.N.  Sent: 2/15/2023  11:41 AM PST  To: ANGELITA Keen          ----- Message -----  From: Purnima Bundy, Med Ass't  Sent: 2/15/2023  11:00 AM PST  To: Maya Hewitt R.N.

## 2023-02-15 NOTE — PROGRESS NOTES
Patient came in today for BP check and to check medication regimen. Explained in detail each medication patient should to taken. Written instructions given to patient. Patient stated she does not have aspirin, chlorthalidone, or lasix at home. Will send in Rx for patient. This RN discucced in detail each medication and recommended empty her pill container and restarting with all the current medications.   BP today was 152/52. Asked patient and son to come back in a week for a follow up and to bring her blood pressure cuff in at this time.     SC-please advise

## 2023-02-15 NOTE — RESULT ENCOUNTER NOTE
Patient was in office today, went through all medications with patient. She has a follow up blood pressure check in office on 22nd and will make sure patient is taking medications as SC recommended.

## 2023-02-17 ENCOUNTER — APPOINTMENT (OUTPATIENT)
Dept: RADIOLOGY | Facility: MEDICAL CENTER | Age: 73
End: 2023-02-17
Attending: STUDENT IN AN ORGANIZED HEALTH CARE EDUCATION/TRAINING PROGRAM
Payer: MEDICARE

## 2023-02-17 ENCOUNTER — HOSPITAL ENCOUNTER (EMERGENCY)
Facility: MEDICAL CENTER | Age: 73
End: 2023-02-18
Attending: STUDENT IN AN ORGANIZED HEALTH CARE EDUCATION/TRAINING PROGRAM
Payer: MEDICARE

## 2023-02-17 LAB
ALBUMIN SERPL BCP-MCNC: 4.3 G/DL (ref 3.2–4.9)
ALBUMIN/GLOB SERPL: 1.5 G/DL
ALP SERPL-CCNC: 111 U/L (ref 30–99)
ALT SERPL-CCNC: 16 U/L (ref 2–50)
ANION GAP SERPL CALC-SCNC: 15 MMOL/L (ref 7–16)
AST SERPL-CCNC: 22 U/L (ref 12–45)
BASOPHILS # BLD AUTO: 1 % (ref 0–1.8)
BASOPHILS # BLD: 0.02 K/UL (ref 0–0.12)
BILIRUB SERPL-MCNC: 0.3 MG/DL (ref 0.1–1.5)
BUN SERPL-MCNC: 27 MG/DL (ref 8–22)
CALCIUM ALBUM COR SERPL-MCNC: 9.7 MG/DL (ref 8.5–10.5)
CALCIUM SERPL-MCNC: 9.9 MG/DL (ref 8.4–10.2)
CHLORIDE SERPL-SCNC: 101 MMOL/L (ref 96–112)
CO2 SERPL-SCNC: 23 MMOL/L (ref 20–33)
CREAT SERPL-MCNC: 1.17 MG/DL (ref 0.5–1.4)
EKG IMPRESSION: NORMAL
EOSINOPHIL # BLD AUTO: 0.01 K/UL (ref 0–0.51)
EOSINOPHIL NFR BLD: 0.5 % (ref 0–6.9)
ERYTHROCYTE [DISTWIDTH] IN BLOOD BY AUTOMATED COUNT: 44.7 FL (ref 35.9–50)
GFR SERPLBLD CREATININE-BSD FMLA CKD-EPI: 49 ML/MIN/1.73 M 2
GLOBULIN SER CALC-MCNC: 2.9 G/DL (ref 1.9–3.5)
GLUCOSE SERPL-MCNC: 127 MG/DL (ref 65–99)
HCT VFR BLD AUTO: 38.8 % (ref 37–47)
HGB BLD-MCNC: 12.2 G/DL (ref 12–16)
IMM GRANULOCYTES # BLD AUTO: 0.04 K/UL (ref 0–0.11)
IMM GRANULOCYTES NFR BLD AUTO: 2 % (ref 0–0.9)
LYMPHOCYTES # BLD AUTO: 0.4 K/UL (ref 1–4.8)
LYMPHOCYTES NFR BLD: 19.8 % (ref 22–41)
MCH RBC QN AUTO: 29.4 PG (ref 27–33)
MCHC RBC AUTO-ENTMCNC: 31.4 G/DL (ref 33.6–35)
MCV RBC AUTO: 93.5 FL (ref 81.4–97.8)
MONOCYTES # BLD AUTO: 0.42 K/UL (ref 0–0.85)
MONOCYTES NFR BLD AUTO: 20.8 % (ref 0–13.4)
NEUTROPHILS # BLD AUTO: 1.13 K/UL (ref 2–7.15)
NEUTROPHILS NFR BLD: 55.9 % (ref 44–72)
NRBC # BLD AUTO: 0 K/UL
NRBC BLD-RTO: 0 /100 WBC
PLATELET # BLD AUTO: 140 K/UL (ref 164–446)
PMV BLD AUTO: 13.1 FL (ref 9–12.9)
POTASSIUM SERPL-SCNC: 3.5 MMOL/L (ref 3.6–5.5)
PROT SERPL-MCNC: 7.2 G/DL (ref 6–8.2)
RBC # BLD AUTO: 4.15 M/UL (ref 4.2–5.4)
SODIUM SERPL-SCNC: 139 MMOL/L (ref 135–145)
TROPONIN T SERPL-MCNC: 44 NG/L (ref 6–19)
WBC # BLD AUTO: 2 K/UL (ref 4.8–10.8)

## 2023-02-17 PROCEDURE — 71045 X-RAY EXAM CHEST 1 VIEW: CPT

## 2023-02-17 PROCEDURE — 700105 HCHG RX REV CODE 258: Performed by: STUDENT IN AN ORGANIZED HEALTH CARE EDUCATION/TRAINING PROGRAM

## 2023-02-17 PROCEDURE — 84484 ASSAY OF TROPONIN QUANT: CPT

## 2023-02-17 PROCEDURE — A9270 NON-COVERED ITEM OR SERVICE: HCPCS | Performed by: STUDENT IN AN ORGANIZED HEALTH CARE EDUCATION/TRAINING PROGRAM

## 2023-02-17 PROCEDURE — 85025 COMPLETE CBC W/AUTO DIFF WBC: CPT

## 2023-02-17 PROCEDURE — 80053 COMPREHEN METABOLIC PANEL: CPT

## 2023-02-17 PROCEDURE — 700102 HCHG RX REV CODE 250 W/ 637 OVERRIDE(OP): Performed by: STUDENT IN AN ORGANIZED HEALTH CARE EDUCATION/TRAINING PROGRAM

## 2023-02-17 PROCEDURE — 93005 ELECTROCARDIOGRAM TRACING: CPT | Performed by: STUDENT IN AN ORGANIZED HEALTH CARE EDUCATION/TRAINING PROGRAM

## 2023-02-17 PROCEDURE — 36415 COLL VENOUS BLD VENIPUNCTURE: CPT

## 2023-02-17 PROCEDURE — 99285 EMERGENCY DEPT VISIT HI MDM: CPT

## 2023-02-17 PROCEDURE — 93005 ELECTROCARDIOGRAM TRACING: CPT

## 2023-02-17 RX ORDER — METOPROLOL TARTRATE 1 MG/ML
2.5 INJECTION, SOLUTION INTRAVENOUS ONCE
Status: DISCONTINUED | OUTPATIENT
Start: 2023-02-17 | End: 2023-02-17

## 2023-02-17 RX ORDER — SODIUM CHLORIDE 9 MG/ML
500 INJECTION, SOLUTION INTRAVENOUS ONCE
Status: COMPLETED | OUTPATIENT
Start: 2023-02-17 | End: 2023-02-17

## 2023-02-17 RX ORDER — SODIUM CHLORIDE 9 MG/ML
500 INJECTION, SOLUTION INTRAVENOUS ONCE
Status: COMPLETED | OUTPATIENT
Start: 2023-02-18 | End: 2023-02-18

## 2023-02-17 RX ORDER — ASPIRIN 81 MG/1
324 TABLET, CHEWABLE ORAL ONCE
Status: COMPLETED | OUTPATIENT
Start: 2023-02-17 | End: 2023-02-17

## 2023-02-17 RX ADMIN — ASPIRIN 81 MG 324 MG: 81 TABLET ORAL at 23:45

## 2023-02-17 RX ADMIN — SODIUM CHLORIDE 500 ML: 9 INJECTION, SOLUTION INTRAVENOUS at 22:39

## 2023-02-17 ASSESSMENT — FIBROSIS 4 INDEX: FIB4 SCORE: 4.04

## 2023-02-17 NOTE — PROGRESS NOTES
Subjective:   Tere Gallegos is a 72 y.o. female here today for restless legs    RLS (restless legs syndrome)  Chronic problem. Starts at around 9pm. No longer on dialysis after renal transplant. Previously on ropinirole for this with minimal improvement.      Chronic. Previously improved with gabapentin 100 mg nightly as needed, however she is not currently taking this, it is not in her medication bag.        Current medicines (including changes today)  Current Outpatient Medications   Medication Sig Dispense Refill    gabapentin (NEURONTIN) 100 MG Cap Take 1 Capsule by mouth every evening. 90 Capsule 3    insulin glargine (LANTUS SOLOSTAR) 100 UNIT/ML Solution Pen-injector injection Inject 5 Units under the skin every day. (Patient not taking: Reported on 2/15/2023)      amLODIPine (NORVASC) 10 MG Tab Take 10 mg by mouth every day.      ASPIRIN LOW DOSE 81 MG EC tablet Take 1 Tablet by mouth every day. 100 Tablet 3    chlorthalidone (HYGROTON) 25 MG Tab Take 0.5 Tablets by mouth every day. 45 Tablet 3    furosemide (LASIX) 20 MG Tab Take 1 Tablet by mouth 1 time a day as needed (leg swelling). 30 Tablet 11    BD PEN NEEDLE PETE 2ND GEN USE AS DIRECTED WITH INSULIN PENS THREE TIMES DAILY BEFORE A MEAL      doxazosin (CARDURA) 1 MG Tab Take 1 Tablet by mouth every evening. 30 Tablet 11    apixaban (ELIQUIS) 5 MG Tab tablet Take 1 Tablet by mouth 2 times a day. 180 Tablet 1    losartan (COZAAR) 100 MG Tab Take 1 Tablet by mouth every evening. 90 Tablet 3    levothyroxine (SYNTHROID) 50 MCG Tab Take 1 Tablet by mouth every morning on an empty stomach. 90 Tablet 4    glucose blood (ONETOUCH VERIO) strip 1 Strip by Other route as needed (on insulin checking 3-4 times a day). 150 Strip 6    Blood Glucose Monitoring Suppl (ONE TOUCH ULTRA 2) w/Device Kit 1 DEVICE 3 TIMES A DAY BEFORE MEALS. 1 Kit 0    carvedilol (COREG) 6.25 MG Tab Take 1 Tablet by mouth 2 times a day. 200 Tablet 3    atorvastatin (LIPITOR) 20  MG Tab Take 1 Tablet by mouth every evening. 100 Tablet 3    magnesium oxide 400 (240 Mg) MG Tab Take 1 Tablet by mouth every day. (Patient not taking: Reported on 2/15/2023) 10 Tablet 0    omeprazole (PRILOSEC) 20 MG delayed-release capsule Take 1 Capsule by mouth every day. (Patient not taking: Reported on 2/15/2023) 30 Capsule 0    docusate sodium (COLACE) 100 MG Cap Take 100-200 mg by mouth 2 times a day as needed for Constipation. Indications: Constipation      Lancets Use one Freestyle Pearl lancet to test blood sugar once daily . 300 Each 3    sodium bicarbonate (SODIUM BICARBONATE) 650 MG Tab Take 2 Tablets by mouth in the morning, at noon, and at bedtime. 120 Tablet 3    fluticasone (FLONASE) 50 MCG/ACT nasal spray ADMINISTER 1 SPRAY INTO AFFECTED NOSTRIL(S) EVERY DAY. 16 mL 1    insulin glargine 100 UNIT/ML SC SOLN Inject 8 Units under the skin every day.      insulin aspart (NOVOLOG FLEXPEN) 100 UNIT/ML injection PEN Inject 2-8 Units under the skin 3 times a day before meals. Sliding Scale (Patient not taking: Reported on 2/15/2023)      predniSONE (DELTASONE) 5 MG Tab Take 2 Tablets by mouth every day.      mycophenolate (CELLCEPT) 500 MG tablet Take 2 Tablets by mouth 2 times a day.      tacrolimus (PROGRAF) 1 MG Cap Take 1 Capsule by mouth 2 times a day.       No current facility-administered medications for this visit.     She  has a past medical history of Acquired hypothyroidism (05/04/2020), CAD (coronary artery disease), Chronic diastolic heart failure ([facility]) (05/04/2020), Coronary artery disease due to lipid rich plaque, Dental disorder, Diabetes ([facility]), ESRD (end stage renal disease) on dialysis ([facility]) (05/04/2020), Hemodialysis patient ([facility]), Hyperlipidemia, Hypertension, Kidney transplant candidate, Kidney transplant recipient (10/31/2022), Presence of drug-eluting stent in right coronary artery, QT prolongation (01/22/2020), RLS (restless legs syndrome) (08/05/2016), and Transaminitis  "(12/22/2018).    ROS   No chest pain, no shortness of breath, no abdominal pain  Positive ROS as per HPI.  All other systems reviewed and are negative.     Objective:     /56 (BP Location: Right arm, Patient Position: Sitting, BP Cuff Size: Adult)   Pulse (!) 58   Temp 36.6 °C (97.8 °F) (Temporal)   Ht 1.626 m (5' 4\")   Wt 59 kg (130 lb)   SpO2 100%  Body mass index is 22.31 kg/m².     Physical Exam:  Constitutional: Alert, no distress.  Skin: Warm, dry, good turgor, no rashes in visible areas.  Eye: Equal, round and reactive, conjunctiva clear, lids normal.  ENMT: Mask in place  Respiratory: Unlabored respiratory effort  Psych: Alert and oriented x3, normal affect and mood.        Assessment and Plan:   The following treatment plan was discussed    1. RLS (restless legs syndrome)  Unstable  Restart gabapentin 100 mg daily, refilled  - gabapentin (NEURONTIN) 100 MG Cap; Take 1 Capsule by mouth every evening.  Dispense: 90 Capsule; Refill: 3    2. Need for vaccination  - INFLUENZA VACCINE, HIGH DOSE (65+ ONLY)      Followup: Return in about 3 months (around 4/19/2023).    The MA is performing the above orders under the direction of Dr. Priest    Please note that this dictation was created using voice recognition software. I have made every reasonable attempt to correct obvious errors, but I expect that there are errors of grammar and possibly content that I did not discover before finalizing the note.               "

## 2023-02-18 ENCOUNTER — APPOINTMENT (OUTPATIENT)
Dept: CARDIOLOGY | Facility: MEDICAL CENTER | Age: 73
DRG: 308 | End: 2023-02-18
Attending: STUDENT IN AN ORGANIZED HEALTH CARE EDUCATION/TRAINING PROGRAM
Payer: MEDICARE

## 2023-02-18 ENCOUNTER — HOSPITAL ENCOUNTER (INPATIENT)
Facility: MEDICAL CENTER | Age: 73
LOS: 2 days | DRG: 308 | End: 2023-02-20
Attending: STUDENT IN AN ORGANIZED HEALTH CARE EDUCATION/TRAINING PROGRAM | Admitting: STUDENT IN AN ORGANIZED HEALTH CARE EDUCATION/TRAINING PROGRAM
Payer: MEDICARE

## 2023-02-18 VITALS
TEMPERATURE: 97.2 F | BODY MASS INDEX: 24.89 KG/M2 | HEIGHT: 59 IN | DIASTOLIC BLOOD PRESSURE: 71 MMHG | WEIGHT: 123.46 LBS | SYSTOLIC BLOOD PRESSURE: 99 MMHG | OXYGEN SATURATION: 95 % | RESPIRATION RATE: 17 BRPM | HEART RATE: 74 BPM

## 2023-02-18 DIAGNOSIS — I25.111 CORONARY ARTERY DISEASE INVOLVING NATIVE CORONARY ARTERY OF NATIVE HEART WITH ANGINA PECTORIS WITH DOCUMENTED SPASM (HCC): ICD-10-CM

## 2023-02-18 PROBLEM — I48.91 ATRIAL FIBRILLATION WITH RVR (HCC): Status: ACTIVE | Noted: 2023-02-18

## 2023-02-18 LAB
ALBUMIN SERPL BCP-MCNC: 3.6 G/DL (ref 3.2–4.9)
APTT PPP: 124.3 SEC (ref 24.7–36)
APTT PPP: 35.6 SEC (ref 24.7–36)
APTT PPP: 93.4 SEC (ref 24.7–36)
BASOPHILS # BLD AUTO: 1.7 % (ref 0–1.8)
BASOPHILS # BLD: 0.03 K/UL (ref 0–0.12)
BUN SERPL-MCNC: 25 MG/DL (ref 8–22)
BURR CELLS BLD QL SMEAR: NORMAL
CALCIUM ALBUM COR SERPL-MCNC: 8.9 MG/DL (ref 8.5–10.5)
CALCIUM SERPL-MCNC: 8.6 MG/DL (ref 8.5–10.5)
CHLORIDE SERPL-SCNC: 110 MMOL/L (ref 96–112)
CHOLEST SERPL-MCNC: 97 MG/DL (ref 100–199)
CO2 SERPL-SCNC: 18 MMOL/L (ref 20–33)
CREAT SERPL-MCNC: 0.87 MG/DL (ref 0.5–1.4)
EKG IMPRESSION: NORMAL
EKG IMPRESSION: NORMAL
EOSINOPHIL # BLD AUTO: 0.01 K/UL (ref 0–0.51)
EOSINOPHIL NFR BLD: 0.8 % (ref 0–6.9)
ERYTHROCYTE [DISTWIDTH] IN BLOOD BY AUTOMATED COUNT: 46.3 FL (ref 35.9–50)
EST. AVERAGE GLUCOSE BLD GHB EST-MCNC: 126 MG/DL
GFR SERPLBLD CREATININE-BSD FMLA CKD-EPI: 70 ML/MIN/1.73 M 2
GLUCOSE BLD STRIP.AUTO-MCNC: 135 MG/DL (ref 65–99)
GLUCOSE BLD STRIP.AUTO-MCNC: 211 MG/DL (ref 65–99)
GLUCOSE BLD STRIP.AUTO-MCNC: 274 MG/DL (ref 65–99)
GLUCOSE SERPL-MCNC: 90 MG/DL (ref 65–99)
HBA1C MFR BLD: 6 % (ref 4–5.6)
HCT VFR BLD AUTO: 34.5 % (ref 37–47)
HDLC SERPL-MCNC: 31 MG/DL
HGB BLD-MCNC: 10.5 G/DL (ref 12–16)
INR PPP: 1.46 (ref 0.87–1.13)
LDLC SERPL CALC-MCNC: 37 MG/DL
LV EJECT FRACT  99904: 55
LV EJECT FRACT MOD 2C 99903: 74.08
LV EJECT FRACT MOD 4C 99902: 46.9
LV EJECT FRACT MOD BP 99901: 60.29
LYMPHOCYTES # BLD AUTO: 0.32 K/UL (ref 1–4.8)
LYMPHOCYTES NFR BLD: 18.8 % (ref 22–41)
MAGNESIUM SERPL-MCNC: 1.6 MG/DL (ref 1.5–2.5)
MANUAL DIFF BLD: NORMAL
MCH RBC QN AUTO: 29.4 PG (ref 27–33)
MCHC RBC AUTO-ENTMCNC: 30.4 G/DL (ref 33.6–35)
MCV RBC AUTO: 96.6 FL (ref 81.4–97.8)
MONOCYTES # BLD AUTO: 0.33 K/UL (ref 0–0.85)
MONOCYTES NFR BLD AUTO: 19.7 % (ref 0–13.4)
MORPHOLOGY BLD-IMP: NORMAL
NEUTROPHILS # BLD AUTO: 1 K/UL (ref 2–7.15)
NEUTROPHILS NFR BLD: 59 % (ref 44–72)
NRBC # BLD AUTO: 0 K/UL
NRBC BLD-RTO: 0 /100 WBC
NT-PROBNP SERPL IA-MCNC: ABNORMAL PG/ML (ref 0–125)
OVALOCYTES BLD QL SMEAR: NORMAL
PHOSPHATE SERPL-MCNC: 2.8 MG/DL (ref 2.5–4.5)
PLATELET # BLD AUTO: 114 K/UL (ref 164–446)
PLATELET BLD QL SMEAR: NORMAL
PMV BLD AUTO: 11.6 FL (ref 9–12.9)
POIKILOCYTOSIS BLD QL SMEAR: NORMAL
POTASSIUM SERPL-SCNC: 3.4 MMOL/L (ref 3.6–5.5)
PROCALCITONIN SERPL-MCNC: 0.05 NG/ML
PROTHROMBIN TIME: 17.4 SEC (ref 12–14.6)
RBC # BLD AUTO: 3.57 M/UL (ref 4.2–5.4)
RBC BLD AUTO: PRESENT
SODIUM SERPL-SCNC: 139 MMOL/L (ref 135–145)
T4 FREE SERPL-MCNC: 1.25 NG/DL (ref 0.93–1.7)
TRIGL SERPL-MCNC: 144 MG/DL (ref 0–149)
TROPONIN T SERPL-MCNC: 43 NG/L (ref 6–19)
TROPONIN T SERPL-MCNC: 45 NG/L (ref 6–19)
TROPONIN T SERPL-MCNC: 50 NG/L (ref 6–19)
TSH SERPL DL<=0.005 MIU/L-ACNC: 9.01 UIU/ML (ref 0.38–5.33)
UFH PPP CHRO-ACNC: >1.1 IU/ML
WBC # BLD AUTO: 1.7 K/UL (ref 4.8–10.8)

## 2023-02-18 PROCEDURE — 700105 HCHG RX REV CODE 258: Performed by: STUDENT IN AN ORGANIZED HEALTH CARE EDUCATION/TRAINING PROGRAM

## 2023-02-18 PROCEDURE — 36415 COLL VENOUS BLD VENIPUNCTURE: CPT

## 2023-02-18 PROCEDURE — 84439 ASSAY OF FREE THYROXINE: CPT

## 2023-02-18 PROCEDURE — 93005 ELECTROCARDIOGRAM TRACING: CPT | Performed by: STUDENT IN AN ORGANIZED HEALTH CARE EDUCATION/TRAINING PROGRAM

## 2023-02-18 PROCEDURE — 84484 ASSAY OF TROPONIN QUANT: CPT

## 2023-02-18 PROCEDURE — 93306 TTE W/DOPPLER COMPLETE: CPT

## 2023-02-18 PROCEDURE — 85007 BL SMEAR W/DIFF WBC COUNT: CPT

## 2023-02-18 PROCEDURE — 85025 COMPLETE CBC W/AUTO DIFF WBC: CPT

## 2023-02-18 PROCEDURE — 83880 ASSAY OF NATRIURETIC PEPTIDE: CPT

## 2023-02-18 PROCEDURE — A9270 NON-COVERED ITEM OR SERVICE: HCPCS | Performed by: STUDENT IN AN ORGANIZED HEALTH CARE EDUCATION/TRAINING PROGRAM

## 2023-02-18 PROCEDURE — 93010 ELECTROCARDIOGRAM REPORT: CPT | Performed by: INTERNAL MEDICINE

## 2023-02-18 PROCEDURE — 80061 LIPID PANEL: CPT

## 2023-02-18 PROCEDURE — 85610 PROTHROMBIN TIME: CPT

## 2023-02-18 PROCEDURE — 80069 RENAL FUNCTION PANEL: CPT

## 2023-02-18 PROCEDURE — 84145 PROCALCITONIN (PCT): CPT

## 2023-02-18 PROCEDURE — 85520 HEPARIN ASSAY: CPT

## 2023-02-18 PROCEDURE — 770020 HCHG ROOM/CARE - TELE (206)

## 2023-02-18 PROCEDURE — 84443 ASSAY THYROID STIM HORMONE: CPT

## 2023-02-18 PROCEDURE — 82962 GLUCOSE BLOOD TEST: CPT

## 2023-02-18 PROCEDURE — 85730 THROMBOPLASTIN TIME PARTIAL: CPT

## 2023-02-18 PROCEDURE — 83735 ASSAY OF MAGNESIUM: CPT

## 2023-02-18 PROCEDURE — 84484 ASSAY OF TROPONIN QUANT: CPT | Mod: 91

## 2023-02-18 PROCEDURE — 99223 1ST HOSP IP/OBS HIGH 75: CPT | Mod: AI | Performed by: STUDENT IN AN ORGANIZED HEALTH CARE EDUCATION/TRAINING PROGRAM

## 2023-02-18 PROCEDURE — 83036 HEMOGLOBIN GLYCOSYLATED A1C: CPT

## 2023-02-18 PROCEDURE — 700101 HCHG RX REV CODE 250: Performed by: STUDENT IN AN ORGANIZED HEALTH CARE EDUCATION/TRAINING PROGRAM

## 2023-02-18 PROCEDURE — 96374 THER/PROPH/DIAG INJ IV PUSH: CPT

## 2023-02-18 PROCEDURE — 700111 HCHG RX REV CODE 636 W/ 250 OVERRIDE (IP): Performed by: STUDENT IN AN ORGANIZED HEALTH CARE EDUCATION/TRAINING PROGRAM

## 2023-02-18 PROCEDURE — 99223 1ST HOSP IP/OBS HIGH 75: CPT | Performed by: INTERNAL MEDICINE

## 2023-02-18 PROCEDURE — 93306 TTE W/DOPPLER COMPLETE: CPT | Mod: 26 | Performed by: INTERNAL MEDICINE

## 2023-02-18 PROCEDURE — 700102 HCHG RX REV CODE 250 W/ 637 OVERRIDE(OP): Performed by: STUDENT IN AN ORGANIZED HEALTH CARE EDUCATION/TRAINING PROGRAM

## 2023-02-18 RX ORDER — LANOLIN ALCOHOL/MO/W.PET/CERES
400 CREAM (GRAM) TOPICAL DAILY
Status: COMPLETED | OUTPATIENT
Start: 2023-02-18 | End: 2023-02-20

## 2023-02-18 RX ORDER — TACROLIMUS 1 MG/1
1 CAPSULE ORAL 2 TIMES DAILY
Status: DISCONTINUED | OUTPATIENT
Start: 2023-02-18 | End: 2023-02-20 | Stop reason: HOSPADM

## 2023-02-18 RX ORDER — POLYETHYLENE GLYCOL 3350 17 G/17G
1 POWDER, FOR SOLUTION ORAL
Status: DISCONTINUED | OUTPATIENT
Start: 2023-02-18 | End: 2023-02-20 | Stop reason: HOSPADM

## 2023-02-18 RX ORDER — OMEPRAZOLE 20 MG/1
20 CAPSULE, DELAYED RELEASE ORAL DAILY
Status: DISCONTINUED | OUTPATIENT
Start: 2023-02-18 | End: 2023-02-20 | Stop reason: HOSPADM

## 2023-02-18 RX ORDER — POTASSIUM CHLORIDE 20 MEQ/1
40 TABLET, EXTENDED RELEASE ORAL ONCE
Status: DISCONTINUED | OUTPATIENT
Start: 2023-02-18 | End: 2023-02-18

## 2023-02-18 RX ORDER — SODIUM CHLORIDE 9 MG/ML
1000 INJECTION, SOLUTION INTRAVENOUS ONCE
Status: COMPLETED | OUTPATIENT
Start: 2023-02-18 | End: 2023-02-18

## 2023-02-18 RX ORDER — MAGNESIUM SULFATE HEPTAHYDRATE 40 MG/ML
2 INJECTION, SOLUTION INTRAVENOUS ONCE
Status: COMPLETED | OUTPATIENT
Start: 2023-02-18 | End: 2023-02-18

## 2023-02-18 RX ORDER — METOPROLOL TARTRATE 1 MG/ML
2.5 INJECTION, SOLUTION INTRAVENOUS ONCE
Status: DISCONTINUED | OUTPATIENT
Start: 2023-02-18 | End: 2023-02-18 | Stop reason: HOSPADM

## 2023-02-18 RX ORDER — BISACODYL 10 MG
10 SUPPOSITORY, RECTAL RECTAL
Status: DISCONTINUED | OUTPATIENT
Start: 2023-02-18 | End: 2023-02-20 | Stop reason: HOSPADM

## 2023-02-18 RX ORDER — MAGNESIUM SULFATE 1 G/100ML
1 INJECTION INTRAVENOUS ONCE
Status: COMPLETED | OUTPATIENT
Start: 2023-02-18 | End: 2023-02-18

## 2023-02-18 RX ORDER — PREDNISONE 10 MG/1
10 TABLET ORAL DAILY
Status: DISCONTINUED | OUTPATIENT
Start: 2023-02-18 | End: 2023-02-20 | Stop reason: HOSPADM

## 2023-02-18 RX ORDER — LEVOTHYROXINE SODIUM 0.05 MG/1
50 TABLET ORAL
Status: DISCONTINUED | OUTPATIENT
Start: 2023-02-18 | End: 2023-02-20 | Stop reason: HOSPADM

## 2023-02-18 RX ORDER — MYCOPHENOLATE MOFETIL 250 MG/1
1000 CAPSULE ORAL 2 TIMES DAILY
Status: DISCONTINUED | OUTPATIENT
Start: 2023-02-18 | End: 2023-02-20 | Stop reason: HOSPADM

## 2023-02-18 RX ORDER — NITROGLYCERIN 0.4 MG/1
0.4 TABLET SUBLINGUAL
Status: DISCONTINUED | OUTPATIENT
Start: 2023-02-18 | End: 2023-02-20 | Stop reason: HOSPADM

## 2023-02-18 RX ORDER — GABAPENTIN 100 MG/1
100 CAPSULE ORAL EVERY EVENING
Status: DISCONTINUED | OUTPATIENT
Start: 2023-02-18 | End: 2023-02-20 | Stop reason: HOSPADM

## 2023-02-18 RX ORDER — POTASSIUM CHLORIDE 20 MEQ/1
40 TABLET, EXTENDED RELEASE ORAL ONCE
Status: ACTIVE | OUTPATIENT
Start: 2023-02-18 | End: 2023-02-19

## 2023-02-18 RX ORDER — POTASSIUM CHLORIDE 20 MEQ/1
60 TABLET, EXTENDED RELEASE ORAL ONCE
Status: COMPLETED | OUTPATIENT
Start: 2023-02-18 | End: 2023-02-18

## 2023-02-18 RX ORDER — MORPHINE SULFATE 4 MG/ML
2-4 INJECTION INTRAVENOUS
Status: DISCONTINUED | OUTPATIENT
Start: 2023-02-18 | End: 2023-02-20 | Stop reason: HOSPADM

## 2023-02-18 RX ORDER — HEPARIN SODIUM 5000 [USP'U]/100ML
0-30 INJECTION, SOLUTION INTRAVENOUS CONTINUOUS
Status: DISCONTINUED | OUTPATIENT
Start: 2023-02-18 | End: 2023-02-18

## 2023-02-18 RX ORDER — MAGNESIUM SULFATE HEPTAHYDRATE 40 MG/ML
2 INJECTION, SOLUTION INTRAVENOUS ONCE
Status: DISCONTINUED | OUTPATIENT
Start: 2023-02-18 | End: 2023-02-18

## 2023-02-18 RX ORDER — HEPARIN SODIUM 5000 [USP'U]/100ML
INJECTION, SOLUTION INTRAVENOUS CONTINUOUS
Status: DISCONTINUED | OUTPATIENT
Start: 2023-02-18 | End: 2023-02-19

## 2023-02-18 RX ORDER — CARVEDILOL 3.12 MG/1
3.12 TABLET ORAL 2 TIMES DAILY WITH MEALS
Status: DISCONTINUED | OUTPATIENT
Start: 2023-02-18 | End: 2023-02-19

## 2023-02-18 RX ORDER — GUAIFENESIN/DEXTROMETHORPHAN 100-10MG/5
10 SYRUP ORAL EVERY 6 HOURS PRN
Status: DISCONTINUED | OUTPATIENT
Start: 2023-02-18 | End: 2023-02-20 | Stop reason: HOSPADM

## 2023-02-18 RX ORDER — ATORVASTATIN CALCIUM 40 MG/1
40 TABLET, FILM COATED ORAL NIGHTLY
Status: DISCONTINUED | OUTPATIENT
Start: 2023-02-18 | End: 2023-02-20 | Stop reason: HOSPADM

## 2023-02-18 RX ORDER — FLUTICASONE PROPIONATE 50 MCG
1 SPRAY, SUSPENSION (ML) NASAL DAILY
Status: DISCONTINUED | OUTPATIENT
Start: 2023-02-18 | End: 2023-02-20 | Stop reason: HOSPADM

## 2023-02-18 RX ORDER — ATORVASTATIN CALCIUM 20 MG/1
20 TABLET, FILM COATED ORAL NIGHTLY
Status: DISCONTINUED | OUTPATIENT
Start: 2023-02-18 | End: 2023-02-18

## 2023-02-18 RX ORDER — LABETALOL HYDROCHLORIDE 5 MG/ML
10 INJECTION, SOLUTION INTRAVENOUS EVERY 4 HOURS PRN
Status: DISCONTINUED | OUTPATIENT
Start: 2023-02-18 | End: 2023-02-20 | Stop reason: HOSPADM

## 2023-02-18 RX ORDER — AMOXICILLIN 250 MG
2 CAPSULE ORAL 2 TIMES DAILY
Status: DISCONTINUED | OUTPATIENT
Start: 2023-02-18 | End: 2023-02-20 | Stop reason: HOSPADM

## 2023-02-18 RX ORDER — METOPROLOL TARTRATE 1 MG/ML
5 INJECTION, SOLUTION INTRAVENOUS
Status: DISCONTINUED | OUTPATIENT
Start: 2023-02-18 | End: 2023-02-20 | Stop reason: HOSPADM

## 2023-02-18 RX ORDER — ONDANSETRON 4 MG/1
4 TABLET, ORALLY DISINTEGRATING ORAL EVERY 4 HOURS PRN
Status: DISCONTINUED | OUTPATIENT
Start: 2023-02-18 | End: 2023-02-20 | Stop reason: HOSPADM

## 2023-02-18 RX ORDER — METOPROLOL TARTRATE 1 MG/ML
2.5 INJECTION, SOLUTION INTRAVENOUS ONCE
Status: COMPLETED | OUTPATIENT
Start: 2023-02-18 | End: 2023-02-18

## 2023-02-18 RX ORDER — ONDANSETRON 2 MG/ML
4 INJECTION INTRAMUSCULAR; INTRAVENOUS EVERY 4 HOURS PRN
Status: DISCONTINUED | OUTPATIENT
Start: 2023-02-18 | End: 2023-02-20 | Stop reason: HOSPADM

## 2023-02-18 RX ORDER — LANOLIN ALCOHOL/MO/W.PET/CERES
400 CREAM (GRAM) TOPICAL DAILY
Status: DISCONTINUED | OUTPATIENT
Start: 2023-02-18 | End: 2023-02-18

## 2023-02-18 RX ORDER — ACETAMINOPHEN 325 MG/1
650 TABLET ORAL EVERY 6 HOURS PRN
Status: DISCONTINUED | OUTPATIENT
Start: 2023-02-18 | End: 2023-02-20 | Stop reason: HOSPADM

## 2023-02-18 RX ORDER — INSULIN GLARGINE 100 [IU]/ML
8 INJECTION, SOLUTION SUBCUTANEOUS DAILY
Status: DISCONTINUED | OUTPATIENT
Start: 2023-02-18 | End: 2023-02-18

## 2023-02-18 RX ORDER — SODIUM BICARBONATE 650 MG/1
1300 TABLET ORAL 3 TIMES DAILY
Status: DISCONTINUED | OUTPATIENT
Start: 2023-02-18 | End: 2023-02-20 | Stop reason: HOSPADM

## 2023-02-18 RX ORDER — CARVEDILOL 6.25 MG/1
6.25 TABLET ORAL 2 TIMES DAILY
Status: DISCONTINUED | OUTPATIENT
Start: 2023-02-18 | End: 2023-02-18

## 2023-02-18 RX ADMIN — GABAPENTIN 100 MG: 100 CAPSULE ORAL at 17:18

## 2023-02-18 RX ADMIN — ATORVASTATIN CALCIUM 40 MG: 40 TABLET, FILM COATED ORAL at 20:28

## 2023-02-18 RX ADMIN — SODIUM BICARBONATE 1300 MG: 650 TABLET ORAL at 20:28

## 2023-02-18 RX ADMIN — TACROLIMUS 1 MG: 1 CAPSULE ORAL at 16:04

## 2023-02-18 RX ADMIN — MYCOPHENOLATE MOFETIL 1000 MG: 250 CAPSULE ORAL at 16:04

## 2023-02-18 RX ADMIN — INSULIN HUMAN 3 UNITS: 100 INJECTION, SOLUTION PARENTERAL at 17:22

## 2023-02-18 RX ADMIN — MAGNESIUM SULFATE HEPTAHYDRATE 1 G: 1 INJECTION, SOLUTION INTRAVENOUS at 06:11

## 2023-02-18 RX ADMIN — INSULIN HUMAN 2 UNITS: 100 INJECTION, SOLUTION PARENTERAL at 20:31

## 2023-02-18 RX ADMIN — MYCOPHENOLATE MOFETIL 1000 MG: 250 CAPSULE ORAL at 05:39

## 2023-02-18 RX ADMIN — HEPARIN SODIUM 750 UNITS/HR: 5000 INJECTION, SOLUTION INTRAVENOUS at 05:46

## 2023-02-18 RX ADMIN — OMEPRAZOLE 20 MG: 20 CAPSULE, DELAYED RELEASE ORAL at 05:15

## 2023-02-18 RX ADMIN — SODIUM CHLORIDE 500 ML: 9 INJECTION, SOLUTION INTRAVENOUS at 00:15

## 2023-02-18 RX ADMIN — PREDNISONE 10 MG: 10 TABLET ORAL at 05:15

## 2023-02-18 RX ADMIN — POTASSIUM CHLORIDE 60 MEQ: 1500 TABLET, EXTENDED RELEASE ORAL at 05:16

## 2023-02-18 RX ADMIN — SENNOSIDES AND DOCUSATE SODIUM 2 TABLET: 50; 8.6 TABLET ORAL at 17:18

## 2023-02-18 RX ADMIN — MAGNESIUM SULFATE HEPTAHYDRATE 2 G: 40 INJECTION, SOLUTION INTRAVENOUS at 06:08

## 2023-02-18 RX ADMIN — SODIUM BICARBONATE 1300 MG: 650 TABLET ORAL at 16:04

## 2023-02-18 RX ADMIN — LEVOTHYROXINE SODIUM 50 MCG: 0.05 TABLET ORAL at 05:15

## 2023-02-18 RX ADMIN — SODIUM CHLORIDE 1000 ML: 9 INJECTION, SOLUTION INTRAVENOUS at 01:03

## 2023-02-18 RX ADMIN — Medication 400 MG: at 05:15

## 2023-02-18 RX ADMIN — METOPROLOL TARTRATE 2.5 MG: 1 INJECTION, SOLUTION INTRAVENOUS at 01:03

## 2023-02-18 RX ADMIN — FLUTICASONE PROPIONATE 50 MCG: 50 SPRAY, METERED NASAL at 05:39

## 2023-02-18 RX ADMIN — TACROLIMUS 1 MG: 1 CAPSULE ORAL at 05:15

## 2023-02-18 ASSESSMENT — FIBROSIS 4 INDEX
FIB4 SCORE: 2.87
FIB4 SCORE: 3.52
FIB4 SCORE: 2.87

## 2023-02-18 ASSESSMENT — ENCOUNTER SYMPTOMS
NAUSEA: 1
SHORTNESS OF BREATH: 0
WEAKNESS: 1
DIZZINESS: 1
PALPITATIONS: 1
DEPRESSION: 0
MYALGIAS: 0
HEADACHES: 1
FEVER: 0
ABDOMINAL PAIN: 0
DOUBLE VISION: 0
COUGH: 0
CHILLS: 0
HEARTBURN: 0
LOSS OF CONSCIOUSNESS: 0
VOMITING: 0
BRUISES/BLEEDS EASILY: 0
FOCAL WEAKNESS: 0
SPEECH CHANGE: 0
BLURRED VISION: 0

## 2023-02-18 ASSESSMENT — COGNITIVE AND FUNCTIONAL STATUS - GENERAL
DRESSING REGULAR UPPER BODY CLOTHING: A LITTLE
STANDING UP FROM CHAIR USING ARMS: A LITTLE
EATING MEALS: A LITTLE
MOBILITY SCORE: 18
SUGGESTED CMS G CODE MODIFIER MOBILITY: CK
TOILETING: A LITTLE
MOVING FROM LYING ON BACK TO SITTING ON SIDE OF FLAT BED: A LITTLE
HELP NEEDED FOR BATHING: A LITTLE
PERSONAL GROOMING: A LITTLE
SUGGESTED CMS G CODE MODIFIER DAILY ACTIVITY: CK
MOVING TO AND FROM BED TO CHAIR: A LITTLE
TURNING FROM BACK TO SIDE WHILE IN FLAT BAD: A LITTLE
DRESSING REGULAR LOWER BODY CLOTHING: A LITTLE
CLIMB 3 TO 5 STEPS WITH RAILING: A LITTLE
WALKING IN HOSPITAL ROOM: A LITTLE
DAILY ACTIVITIY SCORE: 18

## 2023-02-18 ASSESSMENT — CHA2DS2 SCORE
HYPERTENSION: NO
CHF OR LEFT VENTRICULAR DYSFUNCTION: YES
CHA2DS2 VASC SCORE: 2
PRIOR STROKE OR TIA OR THROMBOEMBOLISM: NO
VASCULAR DISEASE: NO
AGE 65 TO 74: YES
DIABETES: NO
AGE 75 OR GREATER: NO

## 2023-02-18 ASSESSMENT — PAIN DESCRIPTION - PAIN TYPE: TYPE: ACUTE PAIN

## 2023-02-18 ASSESSMENT — LIFESTYLE VARIABLES: SUBSTANCE_ABUSE: 0

## 2023-02-18 NOTE — PROGRESS NOTES
Assumed care of pt. Bedside report received from RN . Pt was updated on plan of care. Call light, phone and personal belongings in reach. Bed alarm on and working properly, bed in lowest position, and locked.

## 2023-02-18 NOTE — ASSESSMENT & PLAN NOTE
Likely due to uncontrolled afib  Flat troponin  PRN nitro SL, morphine  Already on heparin gtt  Echo this visit shows EF 55% with dilated left atrium while in A-fib RVR.

## 2023-02-18 NOTE — CONSULTS
EP Consult Note    DOS: 2023    Consulting physician: Evaristo Blanca MD    Chief complaint/Reason for consult: AF    HPI:  Pt is a 71 yo Portuguese speaking F. She has a history of diastolic CHF, PAF, CAD s/p prior RCA PCI, and s/p renal transplant at [facility] on prograf/cellcept. Follows longitudinally here with Pamela Prieto. She is on insulin as well. Presented with chest discomfort x 1 day. Found to be in AF with RVR. Improved with rate control here. Was on Eliquis at home, recently dose titrated to 5 with normal Cr and GFR > 60. She remains in symptomatic AF. Cardiology consulted for arrhythmia management.    ROS (+in BOLD):  Constitutional: Fevers/chills/fatigue/weightloss  HEENT: Blurry vision/eye pain/sore throat/hearing loss  Respiratory: Shortness of breath/cough  Cardiovascular: Chest pain/palpitations/edema/orthopnea/syncope  GI: Nausea/vomitting/diarrhea  MSK: Arthralgias/myagias/muscle weakness  Skin: Rash/sores  Neurological: Numbness/tremors/vertigo  Endocrine: Excessive thirst/polyuria/cold intolerance/heat intolerance  Psych: Depression/anxiety    Past Medical History:   Diagnosis Date    Acquired hypothyroidism 2020    CAD (coronary artery disease)     Chronic diastolic heart failure (HCC) 2020    Coronary artery disease due to lipid rich plaque     2 Synergy NOEMI to 100% RCA stent placed    Dental disorder     partial dentures- uppers    Diabetes ([facility])     oral medication    ESRD (end stage renal disease) on dialysis ([facility]) 2020    Hemodialysis patient ([facility])     M, W, F    Hyperlipidemia     Hypertension     Kidney transplant candidate     Kidney transplant recipient 10/31/2022    Presence of drug-eluting stent in right coronary artery     QT prolongation 2020    RLS (restless legs syndrome) 2016    Transaminitis 2018       Past Surgical History:   Procedure Laterality Date    OTHER ABDOMINAL SURGERY Right 10/31/2022     Donor kidney transplant - [facility]  [facility] CARDIAC CATH  09/07/2016    RCA stented with 2 Synergy drug-eluting stents.    RECOVERY  08/16/2016    Procedure: CATH LAB [facility] WITH POSSIBLE DR. CASTILLO;  Surgeon: Drew Surgery;  Location: SURGERY PRE-POST PROC UNIT [facility] [place] [facility];  Service:     [facility] CARDIAC CATH  08/16/2016    100% RCA    OTHER Left 2014    left arm upper extremity fistula    OTHER ABDOMINAL SURGERY      left kidney removed due to cancer       Social History     Socioeconomic History    Marital status:      Spouse name: Not on file    Number of children: Not on file    Years of education: Not on file    Highest education level: Not on file   Occupational History    Not on file   Tobacco Use    Smoking status: Never    Smokeless tobacco: Never   Vaping Use    Vaping Use: Never used   Substance and Sexual Activity    Alcohol use: No     Alcohol/week: 0.0 oz    Drug use: No    Sexual activity: Never   Other Topics Concern    Not on file   Social History Narrative    Not on file     Social Determinants of Health     Financial Resource Strain: Not on file   Food Insecurity: Not on file   Transportation Needs: Not on file   Physical Activity: Not on file   Stress: Not on file   Social Connections: Not on file   Intimate Partner Violence: Not on file   Housing Stability: Not on file       Family History   Problem Relation Age of Onset    Diabetes Sister     Other Sister         liver disease    Diabetes Brother     Heart Disease Neg Hx        No Known Allergies    Current Facility-Administered Medications   Medication Dose Route Frequency Provider Last Rate Last Admin    senna-docusate (PERICOLACE or SENOKOT S) 8.6-50 MG per tablet 2 Tablet  2 Tablet Oral BID Toby Marsh M.D.        And    polyethylene glycol/lytes (MIRALAX) PACKET 1 Packet  1 Packet Oral QDAY PRN Toby Marsh M.D.        And    bisacodyl (DULCOLAX) suppository 10 mg  10 mg Rectal QDAY PRN Tboy Marsh M.D.        ondansetron (ZOFRAN) syringe/vial  injection 4 mg  4 mg Intravenous Q4HRS PRN Toby Marsh M.D.        ondansetron (ZOFRAN ODT) dispertab 4 mg  4 mg Oral Q4HRS PRN Toby Marsh M.D.        labetalol (NORMODYNE/TRANDATE) injection 10 mg  10 mg Intravenous Q4HRS PRN Toby Marsh M.D.        guaiFENesin dextromethorphan (ROBITUSSIN DM) 100-10 MG/5ML syrup 10 mL  10 mL Oral Q6HRS PRN Toby Marsh M.D.        acetaminophen (Tylenol) tablet 650 mg  650 mg Oral Q6HRS PRN Toby Marsh M.D.        fluticasone (FLONASE) nasal spray 50 mcg  1 Spray Nasal DAILY Toby Marsh M.D.   50 mcg at 02/18/23 0539    gabapentin (NEURONTIN) capsule 100 mg  100 mg Oral Q EVENING Toby Marsh M.D.        levothyroxine (SYNTHROID) tablet 50 mcg  50 mcg Oral AM ES Toby Marsh M.D.   50 mcg at 02/18/23 0515    sodium bicarbonate tablet 1,300 mg  1,300 mg Oral TID Toby Marsh M.D.        tacrolimus (PROGRAF) capsule 1 mg  1 mg Oral BID Toby Marsh M.D.   1 mg at 02/18/23 0515    predniSONE (DELTASONE) tablet 10 mg  10 mg Oral DAILY Toby Marsh M.D.   10 mg at 02/18/23 0515    Metoprolol Tartrate (LOPRESSOR) injection 5 mg  5 mg Intravenous Q5 MIN PRN Toby Marsh M.D.        magnesium oxide tablet 400 mg  400 mg Oral DAILY Toby Marsh M.D.   400 mg at 02/18/23 0515    [START ON 2/19/2023] aspirin EC (ECOTRIN) tablet 81 mg  81 mg Oral DAILY Toby Marsh M.D.        nitroglycerin (NITROSTAT) tablet 0.4 mg  0.4 mg Sublingual Q5 MIN PRN Toby Marsh M.D.        morphine 4 MG/ML injection 2-4 mg  2-4 mg Intravenous Q5 MIN PRN Toby Marsh M.D.        atorvastatin (LIPITOR) tablet 40 mg  40 mg Oral Nightly Toby Marsh M.D.        ipratropium (ATROVENT) 0.02 % nebulizer solution 0.5 mg  0.5 mg Nebulization Q4HRS PRN Toby Marsh M.D.        omeprazole (PRILOSEC) capsule 20 mg  20 mg Oral DAILY Toby Marsh M.D.   20 mg at 02/18/23 0515    mycophenolate (CELLCEPT) capsule 1,000 mg  1,000 mg Oral BID Toby Marsh M.D.   1,000 mg at 02/18/23 0539    insulin  "GLARGINE (Lantus,Semglee) injection  8 Units Subcutaneous DAILY Toby Marsh M.D.        insulin regular (HumuLIN R,NovoLIN R) injection  1-6 Units Subcutaneous 4X/DAY ACHS Toby Marsh M.D.        And    dextrose 10 % BOLUS 25 g  25 g Intravenous Q15 MIN PRN Toby Marsh M.D.        carvedilol (COREG) tablet 3.125 mg  3.125 mg Oral BID WITH MEALS Toby Marsh M.D.        heparin infusion 25,000 Units in 500 mL 0.45% NACL   Intravenous Continuous Toby Marsh M.D. 15 mL/hr at 02/18/23 0700 750 Units/hr at 02/18/23 0700    potassium chloride SA (Kdur) tablet 40 mEq  40 mEq Oral Once Toby Marsh M.D.           Physical Exam:  Vitals:    02/18/23 0248 02/18/23 0325 02/18/23 0418 02/18/23 0744   BP: 100/58  105/66 107/63   Pulse: 100  75 100   Resp: 17  17 19   Temp: 36.3 °C (97.3 °F)  36.7 °C (98.1 °F) 36.7 °C (98.1 °F)   TempSrc: Temporal  Temporal Temporal   SpO2: 95%  93% 93%   Weight: 57.5 kg (126 lb 12.2 oz) 57.5 kg (126 lb 12.2 oz)     Height:  1.499 m (4' 11.02\")       General appearance: NAD, conversant   Eyes: anicteric sclerae, moist conjunctivae; no lid-lag; PERRLA  HENT: Atraumatic; oropharynx clear with moist mucous membranes and no mucosal ulcerations; normal hard and soft palate  Neck: Trachea midline; FROM, supple, no thyromegaly or lymphadenopathy  Lungs: CTA, with normal respiratory effort and no intercostal retractions  CV: irregularly irregular, no MRGs, no JVD   Abdomen: Soft, non-tender; no masses or HSM  Extremities: No peripheral edema or extremity lymphadenopathy  Skin: Normal temperature, turgor and texture; no rash, ulcers or subcutaneous nodules  Psych: Appropriate affect, alert and oriented to person, place and time    Data:  Labs reviewed    Prior echo/stress results reviewed:  LVEF 60%    CXR interpreted by me:  WNL    EKG interpreted by me:   CARLITO w RVR    Impression/Plan:  1) AF with RVR  2) S/p renal transplant  3) CAD  4) HFPEF    -Does not tolerate AF very well  -Very mild " biomarker elevation not consistent with ACS, I do not think further ischemic w/u necessary for now  -Plan for VELMA/DCCV followed by outpatient oral amiodarone load  -Can f/u with EP to discuss PV isolation to get off antiarrhythmics long term    Js Richardson MD

## 2023-02-18 NOTE — PROGRESS NOTES
Cardiology Follow Up Progress Note    Date of Service  2/19/23    Attending Physician  Evaristo Blanca M.D.    Chief Complaint     Cardiology consulted for evaluation of A-fib RVR    HPI  Tere Gallegos is a 72 y.o. female with a history of HFpEF, PAF, on OAS with eliquis ,CAD prior PCI RCA, renal transplant at [facility] on immunosuppressants admitted 2/18/2023 with chest pain.  Found to be in A-fib RVR.      Interim Events    [id number] RVR, heart rate~140s  Symptomatic with chest pain  Repeat EKG revealed no evidence of ischemia  Troponin remains flat  Continue IV metoprolol  Initiate oral metoprolol  Initiate low-dose furosemide    Review of Systems  Review of Systems   Respiratory:  Negative for apnea, cough, choking, chest tightness, shortness of breath, wheezing and stridor.    Cardiovascular:  Positive for chest pain.     Vital signs in last 24 hours  Temp:  [36.3 °C (97.3 °F)-36.7 °C (98.1 °F)] 36.4 °C (97.5 °F)  Pulse:  [] 108  Resp:  [17-19] 19  BP: (100-117)/(58-73) 117/73  SpO2:  [93 %-95 %] 94 %    Physical Exam  Physical Exam  Cardiovascular:      Rate and Rhythm: Tachycardia present. Rhythm irregular.      Pulses: Normal pulses.   Pulmonary:      Effort: Pulmonary effort is normal.   Skin:     General: Skin is warm.   Neurological:      Mental Status: She is alert. Mental status is at baseline.   Psychiatric:         Mood and Affect: Mood normal.       Lab Review  Lab Results   Component Value Date/Time    WBC 1.7 (LL) 02/18/2023 04:17 AM    RBC 3.57 (L) 02/18/2023 04:17 AM    HEMOGLOBIN 10.5 (L) 02/18/2023 04:17 AM    HEMATOCRIT 34.5 (L) 02/18/2023 04:17 AM    MCV 96.6 02/18/2023 04:17 AM    MCH 29.4 02/18/2023 04:17 AM    MCHC 30.4 (L) 02/18/2023 04:17 AM    MPV 11.6 02/18/2023 04:17 AM      Lab Results   Component Value Date/Time    SODIUM 139 02/18/2023 04:17 AM    POTASSIUM 3.4 (L) 02/18/2023 04:17 AM    CHLORIDE 110 02/18/2023 04:17 AM    CO2 18 (L) 02/18/2023 04:17 AM    GLUCOSE 90  02/18/2023 04:17 AM    BUN 25 (H) 02/18/2023 04:17 AM    CREATININE 0.87 02/18/2023 04:17 AM    BUNCREATRAT 8 (L) 01/28/2021 07:00 AM      Lab Results   Component Value Date/Time    ASTSGOT 22 02/17/2023 10:00 PM    ALTSGPT 16 02/17/2023 10:00 PM     Lab Results   Component Value Date/Time    CHOLSTRLTOT 97 (L) 02/18/2023 04:17 AM    LDL 37 02/18/2023 04:17 AM    HDL 31 (A) 02/18/2023 04:17 AM    TRIGLYCERIDE 144 02/18/2023 04:17 AM    TROPONINT 50 (H) 02/18/2023 07:43 AM       Recent Labs     02/18/23  0417   NTPROBNP [id number]*       Cardiac Imaging and Procedures Review  EKG:  My personal interpretation of the EKG dated 2/18/2023   is A-fib, heart rate 120s      Echocardiogram:    Prior echocardiogram 11/28/2022, patient in A fib with RVR during this   study.  Patient in atrial fibrillation with RVR during the study limiting   accurate interpretation.  Preserved biventricular systolic functions, estimated LVEF 55%  Dilated LA  No significant valvular abnormalities.  No pericardial effusion.  Normal IVC size.          Cardiac Catheterization:  6/8/21     POSTOPERATIVE DIAGNOSIS:  1.  Nonobstructive coronary artery disease.  2.  Widely patent previously placed RCA stents  3.  LVEF 70%, LVEDP 22 mmHg       Imaging  Chest X-Ray: 1.  Left midlung linear densities, similar to prior study suggesting atelectasis or scarring. No focal infiltrates are appreciated.  2.  Cardiomegaly  3.  Atherosclerosis        Stress Test:   12/20/22  NUCLEAR IMAGING INTERPRETATION   Normal Lexiscan myocardial perfusion study.   No evidence of ischemia or infarct.   SDS 0.   LVEF 70%.   No ischemic changes with Regadenoson.   No arrhythmias.   No chest pain.   ECG INTERPRETATION   Negative stress ECG for ischemia.            Assessment/Plan    #A-fib with RVR, symptomatic with chest pain, minimal troponin elevations  # Hx of PAF on OAC with Eliquis   #Known CAD PCI RCA  #HFpEF, acute on chronic  #NT proBNP 14,000 on admission  #Status post  renal transplant  # LVEF 55%  # DM2, A1c 6    Recommendations  -Initiate metoprolol 25 mg every 6 hours for better A-fib RVR rate control  -Continue IV heparin  -On aspirin, Lipitor  -Initiate low-dose furosemide  -Strict intake and output  -Plan for VELMA cardioversion this afternoon if anesthesiologist availability to assist otherwise need to postpone until Monday, 2/20/2023.    Cardiology will follow along    I personally spent a total of 18 minutes which includes face-to-face time and non-face-to-face time spent on preparing to see the patient, reviewing hospital notes and tests, obtaining history from the patient, performing a medically appropriate exam, counseling and educating the patient, ordering medications/tests/procedures/referrals as clinically indicated, and documenting information in the electronic medical record.     Thank you for allowing me to participate in the care of this patient.  I will continue to follow this patient    Please contact me with any questions.    DALE Dow.   Cardiologist, [facility]  [phone]

## 2023-02-18 NOTE — PROGRESS NOTES
4 Eyes Skin Assessment Completed by TRAVIS Ron and TRAVIS Mariano.    Head WDL  Ears Redness and Blanching  Nose WDL  Mouth WDL  Neck WDL  Breast/Chest WDL  Shoulder Blades WDL  Spine WDL  (R) Arm/Elbow/Hand Bruising and Scar  (L) Arm/Elbow/Hand Bruising and Scar  Abdomen WDL  Groin WDL  Scrotum/Coccyx/Buttocks Redness and Blanching  (R) Leg Bruising  (L) Leg Bruising  (R) Heel/Foot/Toe Redness, Blanching, and Boggy  (L) Heel/Foot/Toe Redness, Blanching, and Boggy          Devices In Places Tele Box, Blood Pressure Cuff, and Pulse Ox      Interventions In Place Q2 Turns, Heels Loaded W/Pillows, and Pressure Redistribution Mattress    Possible Skin Injury No    Pictures Uploaded Into Epic N/A  Wound Consult Placed N/A  RN Wound Prevention Protocol Ordered Yes

## 2023-02-18 NOTE — ED PROVIDER NOTES
ED Provider Note    CHIEF COMPLAINT  Chief Complaint   Patient presents with    Chest Pain     Started yesterday per grand daughter  pt c/o funny feeling in her chest  like it was racing       Hypertension     Noted her BP has been running high as well        EXTERNAL RECORDS REVIEWED  Outpatient Notes most recent office visit is evaluated for fatigue, increased dose of Eliquis to 5 mg twice daily, was resumed on her regular antihypertensive medications no changes to her medications  patient's most recent stress test on the hospital was reviewed there was no reversible ischemic changes   last echo was reviewed last EF was 60% with grade 2 diastolic dysfunction        HPI/ROS  LIMITATION TO HISTORY   Select: Language Czech,  Used   OUTSIDE HISTORIAN(S):  Family daughter reports patient has been having chest pain intermittently for the past day.    Tere Gallegos is a 72 y.o. female who presents evaluation of intermittent episodes of chest pain described as a sharp pressure sensation on the left side of her chest occasionally radiating to her left arm.  Patient does have a history of hypertension hyperlipidemia coronary artery disease PAF on Eliquis as well as a renal transplant.  States over the past day she has been having intermittent episodes of chest discomfort, they do seem to come and go lasting for several minutes and then resolving without any intervention, there are no specific alleviating or exacerbating factors not associated with nausea diaphoresis or exertion.    PAST MEDICAL HISTORY   has a past medical history of Acquired hypothyroidism (05/04/2020), CAD (coronary artery disease), Chronic diastolic heart failure (HCC) (05/04/2020), Coronary artery disease due to lipid rich plaque, Dental disorder, Diabetes ([facility]), ESRD (end stage renal disease) on dialysis ([facility]) (05/04/2020), Hemodialysis patient ([facility]), Hyperlipidemia, Hypertension, Kidney transplant candidate, Kidney transplant  recipient (10/31/2022), Presence of drug-eluting stent in right coronary artery, QT prolongation (01/22/2020), RLS (restless legs syndrome) (08/05/2016), and Transaminitis (12/22/2018).    SURGICAL HISTORY   has a past surgical history that includes recovery (08/16/2016); other abdominal surgery; other (Left, 2014); zzz cardiac cath (08/16/2016); zzz cardiac cath (09/07/2016); and other abdominal surgery (Right, 10/31/2022).    FAMILY HISTORY  Family History   Problem Relation Age of Onset    Diabetes Sister     Other Sister         liver disease    Diabetes Brother     Heart Disease Neg Hx        SOCIAL HISTORY  Social History     Tobacco Use    Smoking status: Never    Smokeless tobacco: Never   Vaping Use    Vaping Use: Never used   Substance and Sexual Activity    Alcohol use: No     Alcohol/week: 0.0 oz    Drug use: No    Sexual activity: Never       CURRENT MEDICATIONS  Home Medications       Reviewed by Eula Coreas R.N. (Registered Nurse) on 02/17/23 at 2203  Med List Status: Partial     Medication Last Dose Status   amLODIPine (NORVASC) 10 MG Tab  Active   apixaban (ELIQUIS) 5 MG Tab tablet  Active   ASPIRIN LOW DOSE 81 MG EC tablet  Active   atorvastatin (LIPITOR) 20 MG Tab  Active   BD PEN NEEDLE PETE 2ND GEN  Active   Blood Glucose Monitoring Suppl (ONE TOUCH ULTRA 2) w/Device Kit  Active   carvedilol (COREG) 6.25 MG Tab  Active   chlorthalidone (HYGROTON) 25 MG Tab  Active   docusate sodium (COLACE) 100 MG Cap  Active   doxazosin (CARDURA) 1 MG Tab  Active   fluticasone (FLONASE) 50 MCG/ACT nasal spray  Active   furosemide (LASIX) 20 MG Tab  Active   gabapentin (NEURONTIN) 100 MG Cap  Active   glucose blood (ONETOUCH VERIO) strip  Active   insulin aspart (NOVOLOG FLEXPEN) 100 UNIT/ML injection PEN  Active   insulin glargine (LANTUS SOLOSTAR) 100 UNIT/ML Solution Pen-injector injection  Active   insulin glargine 100 UNIT/ML SC SOLN  Active   Lancets  Active   levothyroxine (SYNTHROID) 50 MCG Tab   "Active   losartan (COZAAR) 100 MG Tab  Active   magnesium oxide 400 (240 Mg) MG Tab  Active   mycophenolate (CELLCEPT) 500 MG tablet  Active   omeprazole (PRILOSEC) 20 MG delayed-release capsule  Active   predniSONE (DELTASONE) 5 MG Tab  Active   sodium bicarbonate (SODIUM BICARBONATE) 650 MG Tab  Active   tacrolimus (PROGRAF) 1 MG Cap  Active                    ALLERGIES  No Known Allergies    PHYSICAL EXAM  VITAL SIGNS: /60   Pulse (!) 109   Temp 36.2 °C (97.2 °F) (Temporal)   Resp 19   Ht 1.499 m (4' 11\")   Wt 56 kg (123 lb 7.3 oz)   LMP  (LMP Unknown)   SpO2 93%   BMI 24.94 kg/m²      Pulse ox interpretation: I interpret this pulse ox as normal.  VITALS - vital signs documented prior to this note have been reviewed and noted,  GENERAL - awake, alert, oriented, GCS 15, no apparent distress, non-toxic  appearing  HEENT - normocephalic, atraumatic, pupils equal, sclera anicteric, mucus  membranes moist  NECK - supple, no meningismus, full active range of motion, trachea midline  CARDIOVASCULAR -irregularly irregular regular rate/rhythm, no murmurs/gallops/rubs  PULMONARY - no respiratory distress, speaking in full sentences, clear to  auscultation bilaterally, no wheezing/ronchi/rales, no accessory muscle use  GASTROINTESTINAL - soft, non-tender, non-distended, no rebound, guarding,  or peritonitis  GENITOURINARY - Deferred  NEUROLOGIC - Awake alert, normal mental status, speech fluid, cognition  normal, moves all extremities  MUSCULOSKELETAL - no obvious asymmetry or deformities present  EXTREMITIES - warm, well-perfused, no cyanosis or significant edema  DERMATOLOGIC - warm, dry, no rashes, no jaundice  PSYCHIATRIC - normal affect, normal insight, normal concentration    DIAGNOSTIC STUDIES / PROCEDURES  EKG  I have independently interpreted this EKG  Shows an atrial fibrillation with rapid ventricular response changes of left ventricular hypertrophy ST depressions in V5 and V6 with associated T wave " inversions no STEMI pattern this is changed when compared to prior interpretation is atrial fibrillation LVH with strain pattern as interpreted by myself this is changed when compared to prior    LABS  Remarkable for pancytopenia which appears to be the patient's baseline, she also has an elevated troponin which is increased when compared to baseline.  CMP is otherwise nonactionable    RADIOLOGY  I have independently interpreted the diagnostic imaging associated with this visit and am waiting the final reading from the radiologist.   My preliminary interpretation is a follows: Negative for pneumonia atelectasis versus scarring and no acute process  Radiologist interpretation:     COURSE & MEDICAL DECISION MAKING    ED Observation Status? no    Reevaluation at 2322 patient's blood pressure is improved her Atrial fibrillation with rapid ventricular response has also improved now between 110-120 we will cautiously bolus with fluids as patient continually has dry mucous membranes    Reevaluation at 005 heart rate is still in the 1 teens to 120 range blood pressure is now improved we will bolus with metoprolol and reassess    Reevaluation at 0130 after metoprolol bolus heart rate is in the 110-118  range blood pressure in the high 90s initial 500 cc boluses given      INITIAL ASSESSMENT, COURSE AND PLAN  Care Narrative:   Patient presented for evaluation of chest pain. Differential initially included was not limited to pneumonia pneumothorax pleural effusion atrial fibrillation with rapid ventricular response CHF exacerbation ACS costochondritis pleuritis pericarditis myocarditis less likely aortic dissection or esophageal rupture among other considerations.  Lower concern for underlying pulmonary embolism as the patient has no shortness of breath hypoxia and is on Eliquis.  Her EKG does have T wave inversion with ST depression in V4 through V6 there is no STEMI pattern and there is LVH, there is also atrial fibrillation  with rapid ventricular response.  She was initially hypotensive with dry mucous membranes, states that she has not been drinking much water, she does have a history of CHF patient was mentating well blood pressures were in the low 90 systolic range thus do not see an indication for emergent cardioversion did have dry mucous membranes thus fluids were cautiously bolused.  Labs remarkable for an elevated troponin at 44 she was given a dose of aspirin for this.  This is double compared to her baseline concern for underlying NSTEMI.  She also has a pancytopenia and leukopenia on her labs this appears to be near her baseline no infectious complaints.  Chest x-ray shows stable scarring no evidence of pneumonia or fluid overload.  After 1000 cc bolus of saline she did initially have improvement of her hypotension and tachycardia.  She remained in A-fib RVR and her blood pressure improved after IV fluid bolus thus she was given 2.5 mg of metoprolol with improvement of her A-fib RVR into the 100-115 range.  I did call and speak with our hospitalist and we just discussed admitting the patient here however she has had a recent stress test which is negative and we both feel that neck step in evaluation may be an underlying cardiac catheterization I did call and speak with Dr. Marie, who felt her EKG changes and NSTEMI were likely demand however recommended transfer to [facility] as the patient may require cardioversion.  I did call and speak with Dr. SOLOMON at [facility] who graciously agreed to accept the patient to his service.        HYDRATION: Based on the patient's presentation of Hypotension and Tachycardia the patient was given IV fluids. IV Hydration was used because oral hydration was not as rapid as required. Upon recheck following hydration, the patient was improved.        Critical care    Critical Care Procedure Note    Total critical care time: Approximately 36 minutes    Upon my assess due to a high probability  of clinically significant, life threatening deterioration, secondary to A-fib with RVR associated hypotension the patient required my direct attention and intervention. This critical care time included obtaining a history; examining the patient; pulse oximetry; ordering and review of studies; arranging urgent treatment with development of a management plan; evaluation of patient's response to treatment; frequent reassessment; and, discussions with other providers.    was exclusive of separately billable procedures and treating other patients and teaching time.    ADDITIONAL PROBLEM LIST  1 chest pain  2.  A-fib RVR  3.  Hypotension dehydration  DISPOSITION AND DISCUSSIONS  I have discussed management of the patient with the following physicians and BETTE's: Dr. Uriostegui recommended cardiology consultation for possible transfer  Spoke with dr garay recommended transfer to [facility] and reconsultation with cardiology in the morning for possible cardioversion given the patient's atrial fibrillation which may be causing the patient's elevated troponin as well as EKG changes    Discussion of management with other \[facility] [place] [facility]\"" or appropriate source(s): None       Decision tools and prescription drugs considered including, but not limited to: HEART Score 7 .    FINAL DIAGNOSIS  1 NSTEMI  2.  A-fib RVR  3.  Dehydration       Electronically signed by: Tom Griffith D.O., 2/17/2023 10:47 PM

## 2023-02-18 NOTE — CARE PLAN
The patient is Stable - Low risk of patient condition declining or worsening    Shift Goals  Clinical Goals: echo, heparin gtt, possible CV?  Patient Goals: rest    Progress made toward(s) clinical / shift goals:    Problem: Knowledge Deficit - Standard  Goal: Patient and family/care givers will demonstrate understanding of plan of care, disease process/condition, diagnostic tests and medications  Outcome: Progressing     Problem: Pain - Standard  Goal: Alleviation of pain or a reduction in pain to the patient’s comfort goal  Outcome: Progressing       Patient is not progressing towards the following goals:

## 2023-02-18 NOTE — PROGRESS NOTES
Admitted earlier today by Dr. Marsh for A-fib with RVR  Patient refused potassium repletion  Electrophysiologist Dr. Richardson consulted  Cardioversion pending

## 2023-02-18 NOTE — H&P
Hospital Medicine History & Physical Note    Date of Service  2/18/2023    Primary Care Physician  ANGELITA Concepcion    Consultants  None    Code Status  Full Code    Chief Complaint  No chief complaint on file.  Chest pain    History of Presenting Illness  Tere Gallegos is a 72 y.o.  female with history of ESRD previously on dialysis now s/p renal transplant [facility], chronic atrial fibrillation on Eliquis, HFpEF, CAD, diabetes, hypertension, hyperlipidemia who presented 2/18/2023 as a direct transfer from [facility] for chest pain and arrhythmia.  Professional  service was used as patient is Hungarian-speaking only.    Per patient, she  initially presented to [facility] in the evening or 2/17/2023 for chest pain, generalized weakness.  Patient reported waking up with chest discomfort approximately at 3 PM, reported discomfort radiated to left upper extremity associated with headache, nausea.  Denies diaphoresis, loss of consciousness.     she was found to have A-fib with RVR with rate varying from 110-130s, soft BP in [facility].  It appears that she has total 1 L NS bolus and metoprolol 2.5 mg IV.  She did have mildly elevated troponin T of 44, repeated 43.  On-call cardiology (Dr. Marie) was consulted by ERP, who recommended patient be transferred to [facility] for consideration of nonemergent cardioversion if needed.  Cardiology requests to be reconsulted in AM.  Therefore, patient was transferred to [facility], she was seen by me here and admitted to medicine service for further evaluation and treatment.    I discussed the plan of care with patient and bedside RN.    Review of Systems  Review of Systems   Constitutional:  Positive for malaise/fatigue. Negative for chills and fever.   HENT:  Negative for hearing loss and tinnitus.    Eyes:  Negative for blurred vision and double vision.   Respiratory:  Negative for cough and shortness of  breath.    Cardiovascular:  Positive for chest pain and palpitations.   Gastrointestinal:  Positive for nausea. Negative for abdominal pain, heartburn and vomiting.   Genitourinary:  Negative for dysuria and hematuria.   Musculoskeletal:  Negative for joint pain and myalgias.   Skin:  Negative for itching and rash.   Neurological:  Positive for dizziness, weakness and headaches. Negative for speech change, focal weakness and loss of consciousness.   Endo/Heme/Allergies:  Negative for environmental allergies. Does not bruise/bleed easily.   Psychiatric/Behavioral:  Negative for depression and substance abuse.    All other systems reviewed and are negative.    Past Medical History   has a past medical history of Acquired hypothyroidism (05/04/2020), CAD (coronary artery disease), Chronic diastolic heart failure ([facility]) (05/04/2020), Coronary artery disease due to lipid rich plaque, Dental disorder, Diabetes ([facility]), ESRD (end stage renal disease) on dialysis ([facility]) (05/04/2020), Hemodialysis patient ([facility]), Hyperlipidemia, Hypertension, Kidney transplant candidate, Kidney transplant recipient (10/31/2022), Presence of drug-eluting stent in right coronary artery, QT prolongation (01/22/2020), RLS (restless legs syndrome) (08/05/2016), and Transaminitis (12/22/2018).    Surgical History   has a past surgical history that includes recovery (08/16/2016); other abdominal surgery; other (Left, 2014); zzz cardiac cath (08/16/2016); zzz cardiac cath (09/07/2016); and other abdominal surgery (Right, 10/31/2022).     Family History  family history includes Diabetes in her brother and sister; Other in her sister.   Family history reviewed with patient. There is family history that is pertinent to the chief complaint.     Social History   reports that she has never smoked. She has never used smokeless tobacco. She reports that she does not drink alcohol and does not use drugs.    Allergies  No Known Allergies    Medications  Prior to  Admission Medications   Prescriptions Last Dose Informant Patient Reported? Taking?   ASPIRIN LOW DOSE 81 MG EC tablet   No No   Sig: Take 1 Tablet by mouth every day.   BD PEN NEEDLE PETE 2ND GEN   Yes No   Sig: USE AS DIRECTED WITH INSULIN PENS THREE TIMES DAILY BEFORE A MEAL   Blood Glucose Monitoring Suppl (ONE TOUCH ULTRA 2) w/Device Kit   No No   Si DEVICE 3 TIMES A DAY BEFORE MEALS.   Lancets  Patient's Home Pharmacy, Family Member No No   Sig: Use one Freestyle Pearl lancet to test blood sugar once daily .   amLODIPine (NORVASC) 10 MG Tab   Yes No   Sig: Take 10 mg by mouth every day.   apixaban (ELIQUIS) 5 MG Tab tablet   No No   Sig: Take 1 Tablet by mouth 2 times a day.   atorvastatin (LIPITOR) 20 MG Tab   No No   Sig: Take 1 Tablet by mouth every evening.   carvedilol (COREG) 6.25 MG Tab   No No   Sig: Take 1 Tablet by mouth 2 times a day.   chlorthalidone (HYGROTON) 25 MG Tab   No No   Sig: Take 0.5 Tablets by mouth every day.   docusate sodium (COLACE) 100 MG Cap  Patient's Home Pharmacy, Family Member Yes No   Sig: Take 100-200 mg by mouth 2 times a day as needed for Constipation. Indications: Constipation   doxazosin (CARDURA) 1 MG Tab   No No   Sig: Take 1 Tablet by mouth every evening.   fluticasone (FLONASE) 50 MCG/ACT nasal spray  Patient's Home Pharmacy, Family Member No No   Sig: ADMINISTER 1 SPRAY INTO AFFECTED NOSTRIL(S) EVERY DAY.   furosemide (LASIX) 20 MG Tab   No No   Sig: Take 1 Tablet by mouth 1 time a day as needed (leg swelling).   gabapentin (NEURONTIN) 100 MG Cap   No No   Sig: Take 1 Capsule by mouth every evening.   glucose blood (ONETOUCH VERIO) strip   No No   Si Strip by Other route as needed (on insulin checking 3-4 times a day).   insulin aspart (NOVOLOG FLEXPEN) 100 UNIT/ML injection PEN  Patient's Home Pharmacy, Family Member Yes No   Sig: Inject 2-8 Units under the skin 3 times a day before meals. Sliding Scale   Patient not taking: Reported on 2/15/2023   insulin  glargine (LANTUS SOLOSTAR) 100 UNIT/ML Solution Pen-injector injection   Yes No   Sig: Inject 5 Units under the skin every day.   Patient not taking: Reported on 2/15/2023   insulin glargine 100 UNIT/ML SC SOLN  Patient's Home Pharmacy, Family Member Yes No   Sig: Inject 8 Units under the skin every day.   levothyroxine (SYNTHROID) 50 MCG Tab   No No   Sig: Take 1 Tablet by mouth every morning on an empty stomach.   losartan (COZAAR) 100 MG Tab   No No   Sig: Take 1 Tablet by mouth every evening.   magnesium oxide 400 (240 Mg) MG Tab   No No   Sig: Take 1 Tablet by mouth every day.   Patient not taking: Reported on 2/15/2023   mycophenolate (CELLCEPT) 500 MG tablet  Patient's Home Pharmacy, Family Member Yes No   Sig: Take 2 Tablets by mouth 2 times a day.   omeprazole (PRILOSEC) 20 MG delayed-release capsule   No No   Sig: Take 1 Capsule by mouth every day.   Patient not taking: Reported on 2/15/2023   predniSONE (DELTASONE) 5 MG Tab  Patient, Patient's Home Pharmacy, Family Member Yes No   Sig: Take 2 Tablets by mouth every day.   sodium bicarbonate (SODIUM BICARBONATE) 650 MG Tab  Patient's Home Pharmacy, Family Member No No   Sig: Take 2 Tablets by mouth in the morning, at noon, and at bedtime.   tacrolimus (PROGRAF) 1 MG Cap  Patient, Patient's Home Pharmacy, Family Member Yes No   Sig: Take 1 Capsule by mouth 2 times a day.      Facility-Administered Medications: None       Physical Exam  Temp:  [36.2 °C (97.2 °F)-36.7 °C (98.1 °F)] 36.7 °C (98.1 °F)  Pulse:  [] 75  Resp:  [12-21] 17  BP: ()/(50-76) 105/66  SpO2:  [92 %-100 %] 93 %  Blood Pressure : 100/58   Temperature: 36.3 °C (97.3 °F)   Pulse: 100   Respiration: 17   Pulse Oximetry: 95 %       Physical Exam  Vitals and nursing note reviewed.   Constitutional:       General: She is not in acute distress.  HENT:      Head: Normocephalic and atraumatic.      Nose: Nose normal.      Mouth/Throat:      Mouth: Mucous membranes are moist.       Pharynx: Oropharynx is clear.   Eyes:      General: No scleral icterus.     Extraocular Movements: Extraocular movements intact.   Cardiovascular:      Rate and Rhythm: Tachycardia present. Rhythm irregular.      Pulses: Normal pulses.      Heart sounds:     No friction rub.   Pulmonary:      Effort: No respiratory distress.      Breath sounds: No stridor. No wheezing or rales.   Chest:      Chest wall: No tenderness.   Abdominal:      General: There is no distension.      Tenderness: There is no abdominal tenderness. There is no guarding or rebound.   Genitourinary:     Comments: AUGUSTUS PATTON -- palpable/audible thrill  Musculoskeletal:         General: Normal range of motion.      Cervical back: Neck supple. No tenderness.   Skin:     General: Skin is warm and dry.      Capillary Refill: Capillary refill takes less than 2 seconds.   Neurological:      General: No focal deficit present.      Mental Status: She is alert and oriented to person, place, and time.   Psychiatric:         Mood and Affect: Mood normal.       Laboratory:  Recent Labs     02/17/23 2200 02/18/23 0417   WBC 2.0* 1.7*   RBC 4.15* 3.57*   HEMOGLOBIN 12.2 10.5*   HEMATOCRIT 38.8 34.5*   MCV 93.5 96.6   MCH 29.4 29.4   MCHC 31.4* 30.4*   RDW 44.7 46.3   PLATELETCT 140* 114*   MPV 13.1* 11.6     Recent Labs     02/17/23 2200 02/18/23 0417   SODIUM 139 139   POTASSIUM 3.5* 3.4*   CHLORIDE 101 110   CO2 23 18*   GLUCOSE 127* 90   BUN 27* 25*   CREATININE 1.17 0.87   CALCIUM 9.9 8.6     Recent Labs     02/17/23 2200 02/18/23 0417   ALTSGPT 16  --    ASTSGOT 22  --    ALKPHOSPHAT 111*  --    TBILIRUBIN 0.3  --    GLUCOSE 127* 90     Recent Labs     02/18/23 0417   APTT 35.6   INR 1.46*     Recent Labs     02/18/23 0417   NTPROBNP 28301*     Recent Labs     02/18/23 0417   TRIGLYCERIDE 144   HDL 31*   LDL 37     Recent Labs     02/17/23 2200 02/18/23  0007 02/18/23 0417   TROPONINT 44* 43* 45*       Imaging:  EC-ECHOCARDIOGRAM COMPLETE W/O CONT     (Results Pending)       X-Ray:  I have personally reviewed the images and compared with prior images.  EKG:  I have personally reviewed the images and compared with prior images.    Assessment/Plan:  Justification for Admission Status  I anticipate this patient will require at least 2 midnights of hospitalization, therefore appropriate for inpatient status.        * Atrial fibrillation with RVR (HCC)- (present on admission)  Assessment & Plan  Improving    Continue Coreg as able to tolerate  May consider adding digoxin and/or amiodarone if persistent hypotension  Target K>4.0 and Mg>2.0    Hold Eliquis  Heparin gtt for now    May consider cardiology consult in AM for cardioversion, or sooner pending clinical course    Kidney transplant recipient- (present on admission)  Assessment & Plan  S/p renal transplant 10/2022 at [facility]  Continue Prograf, CellCept, prednisone    Chest pain  Assessment & Plan  Likely due to uncontrolled afib  Flat troponin  PRN nitro SL, morphine  Already on heparin gtt  F/u echo      Presence of drug-eluting stent in right coronary artery- (present on admission)  Assessment & Plan  ASA/statin/BB  On heparin gtt as above    Acquired hypothyroidism- (present on admission)  Assessment & Plan  synthroid    ESRD s/p kidney transplant 10/31/22- (present on admission)  Assessment & Plan  S/p renal transplant  No longer on HD  bicarb    Chronic heart failure with preserved ejection fraction (HCC)  Assessment & Plan  EF 60% in 11/2022  Not in acute exacerbation    Appears hypovolemic -- s/p 1L NS in ED    GERD (gastroesophageal reflux disease)- (present on admission)  Assessment & Plan  PPI    Controlled type 2 diabetes mellitus with chronic kidney disease on chronic dialysis, with long-term current use of insulin (HCC)- (present on admission)  Assessment & Plan  Lantus, ISS        VTE prophylaxis: heparin gtt

## 2023-02-18 NOTE — PROGRESS NOTES
Lab called with critical values for WBC count and heparin XA.  Admitting hospitalist notified with read-back.

## 2023-02-18 NOTE — PROGRESS NOTES
Monitor Summary  Rhythm: Afib  Rate: 114  Ectopy: N/A  - / .10 / -   No monitor strip uploaded.

## 2023-02-18 NOTE — PROGRESS NOTES
Assumed care of pt. Bedside report received from Asaf ROBLES. Pt was updated on plan of care. Call light, phone and personal belongings in reach. Bed alarm on and working properly, bed in lowest position, and locked.

## 2023-02-18 NOTE — ASSESSMENT & PLAN NOTE
Improving    May consider adding digoxin and/or amiodarone if persistent hypotension  Target K>4.0 and Mg>2.0    Hold Eliquis  Heparin gtt for now    Consulted cardiology and status post VELMA cardioversion  Beta-blockers per cardiology

## 2023-02-18 NOTE — PROGRESS NOTES
Brief transfer acceptance note:   Patient presented to outside facility with chest discomfort found to be in atrial fibrillation with RVR.  Patient has a history of atrial fibrillation on Eliquis.  At [facility], cardiology was consulted who recommended transfer to [facility] for possible nonemergent cardioversion.  Patient accepted to inpatient telemetry floor.

## 2023-02-18 NOTE — ED TRIAGE NOTES
Pt comes in w/ family  per grand daughter pt states she has been having feelings of chest discomfort that started yesterday  thought it would go away  rads in to L arm  arm feel different  EKG was done and pt take back to rm now

## 2023-02-19 ENCOUNTER — APPOINTMENT (OUTPATIENT)
Dept: CARDIOLOGY | Facility: MEDICAL CENTER | Age: 73
DRG: 308 | End: 2023-02-19
Attending: NURSE PRACTITIONER
Payer: MEDICARE

## 2023-02-19 LAB
ALBUMIN SERPL BCP-MCNC: 3.9 G/DL (ref 3.2–4.9)
APTT PPP: 119.1 SEC (ref 24.7–36)
APTT PPP: 47.4 SEC (ref 24.7–36)
APTT PPP: 52.1 SEC (ref 24.7–36)
BUN SERPL-MCNC: 25 MG/DL (ref 8–22)
CALCIUM ALBUM COR SERPL-MCNC: 9.6 MG/DL (ref 8.5–10.5)
CALCIUM SERPL-MCNC: 9.5 MG/DL (ref 8.5–10.5)
CHLORIDE SERPL-SCNC: 109 MMOL/L (ref 96–112)
CO2 SERPL-SCNC: 18 MMOL/L (ref 20–33)
CREAT SERPL-MCNC: 1.19 MG/DL (ref 0.5–1.4)
EKG IMPRESSION: NORMAL
EKG IMPRESSION: NORMAL
ERYTHROCYTE [DISTWIDTH] IN BLOOD BY AUTOMATED COUNT: 46.4 FL (ref 35.9–50)
GFR SERPLBLD CREATININE-BSD FMLA CKD-EPI: 48 ML/MIN/1.73 M 2
GLUCOSE BLD STRIP.AUTO-MCNC: 156 MG/DL (ref 65–99)
GLUCOSE BLD STRIP.AUTO-MCNC: 164 MG/DL (ref 65–99)
GLUCOSE BLD STRIP.AUTO-MCNC: 176 MG/DL (ref 65–99)
GLUCOSE BLD STRIP.AUTO-MCNC: 87 MG/DL (ref 65–99)
GLUCOSE SERPL-MCNC: 84 MG/DL (ref 65–99)
HCT VFR BLD AUTO: 36.2 % (ref 37–47)
HGB BLD-MCNC: 11.2 G/DL (ref 12–16)
MCH RBC QN AUTO: 29.8 PG (ref 27–33)
MCHC RBC AUTO-ENTMCNC: 30.9 G/DL (ref 33.6–35)
MCV RBC AUTO: 96.3 FL (ref 81.4–97.8)
PHOSPHATE SERPL-MCNC: 2.7 MG/DL (ref 2.5–4.5)
PLATELET # BLD AUTO: 144 K/UL (ref 164–446)
PMV BLD AUTO: 13 FL (ref 9–12.9)
POTASSIUM SERPL-SCNC: 4.7 MMOL/L (ref 3.6–5.5)
RBC # BLD AUTO: 3.76 M/UL (ref 4.2–5.4)
SODIUM SERPL-SCNC: 139 MMOL/L (ref 135–145)
WBC # BLD AUTO: 2.2 K/UL (ref 4.8–10.8)

## 2023-02-19 PROCEDURE — 93312 ECHO TRANSESOPHAGEAL: CPT | Mod: 26 | Performed by: INTERNAL MEDICINE

## 2023-02-19 PROCEDURE — 5A2204Z RESTORATION OF CARDIAC RHYTHM, SINGLE: ICD-10-PCS | Performed by: INTERNAL MEDICINE

## 2023-02-19 PROCEDURE — 93005 ELECTROCARDIOGRAM TRACING: CPT | Performed by: STUDENT IN AN ORGANIZED HEALTH CARE EDUCATION/TRAINING PROGRAM

## 2023-02-19 PROCEDURE — 99233 SBSQ HOSP IP/OBS HIGH 50: CPT | Performed by: HOSPITALIST

## 2023-02-19 PROCEDURE — 99152 MOD SED SAME PHYS/QHP 5/>YRS: CPT | Performed by: INTERNAL MEDICINE

## 2023-02-19 PROCEDURE — 700101 HCHG RX REV CODE 250: Performed by: STUDENT IN AN ORGANIZED HEALTH CARE EDUCATION/TRAINING PROGRAM

## 2023-02-19 PROCEDURE — 82962 GLUCOSE BLOOD TEST: CPT | Mod: 91

## 2023-02-19 PROCEDURE — A9270 NON-COVERED ITEM OR SERVICE: HCPCS | Performed by: STUDENT IN AN ORGANIZED HEALTH CARE EDUCATION/TRAINING PROGRAM

## 2023-02-19 PROCEDURE — A9270 NON-COVERED ITEM OR SERVICE: HCPCS | Performed by: NURSE PRACTITIONER

## 2023-02-19 PROCEDURE — 700102 HCHG RX REV CODE 250 W/ 637 OVERRIDE(OP): Performed by: NURSE PRACTITIONER

## 2023-02-19 PROCEDURE — 93010 ELECTROCARDIOGRAM REPORT: CPT | Performed by: INTERNAL MEDICINE

## 2023-02-19 PROCEDURE — 700111 HCHG RX REV CODE 636 W/ 250 OVERRIDE (IP): Performed by: NURSE PRACTITIONER

## 2023-02-19 PROCEDURE — 80069 RENAL FUNCTION PANEL: CPT

## 2023-02-19 PROCEDURE — B24BZZ4 ULTRASONOGRAPHY OF HEART WITH AORTA, TRANSESOPHAGEAL: ICD-10-PCS | Performed by: INTERNAL MEDICINE

## 2023-02-19 PROCEDURE — 700105 HCHG RX REV CODE 258: Performed by: HOSPITALIST

## 2023-02-19 PROCEDURE — 92960 CARDIOVERSION ELECTRIC EXT: CPT | Performed by: INTERNAL MEDICINE

## 2023-02-19 PROCEDURE — 770020 HCHG ROOM/CARE - TELE (206)

## 2023-02-19 PROCEDURE — 93005 ELECTROCARDIOGRAM TRACING: CPT | Performed by: HOSPITALIST

## 2023-02-19 PROCEDURE — 700102 HCHG RX REV CODE 250 W/ 637 OVERRIDE(OP): Performed by: STUDENT IN AN ORGANIZED HEALTH CARE EDUCATION/TRAINING PROGRAM

## 2023-02-19 PROCEDURE — 85027 COMPLETE CBC AUTOMATED: CPT

## 2023-02-19 PROCEDURE — 85730 THROMBOPLASTIN TIME PARTIAL: CPT | Mod: 91

## 2023-02-19 PROCEDURE — 700111 HCHG RX REV CODE 636 W/ 250 OVERRIDE (IP): Performed by: STUDENT IN AN ORGANIZED HEALTH CARE EDUCATION/TRAINING PROGRAM

## 2023-02-19 PROCEDURE — 93325 DOPPLER ECHO COLOR FLOW MAPG: CPT

## 2023-02-19 RX ORDER — FUROSEMIDE 10 MG/ML
20 INJECTION INTRAMUSCULAR; INTRAVENOUS ONCE
Status: COMPLETED | OUTPATIENT
Start: 2023-02-19 | End: 2023-02-19

## 2023-02-19 RX ORDER — SODIUM CHLORIDE 9 MG/ML
250 INJECTION, SOLUTION INTRAVENOUS ONCE
Status: COMPLETED | OUTPATIENT
Start: 2023-02-19 | End: 2023-02-19

## 2023-02-19 RX ORDER — MIDAZOLAM HYDROCHLORIDE 1 MG/ML
5 INJECTION INTRAMUSCULAR; INTRAVENOUS ONCE
Status: COMPLETED | OUTPATIENT
Start: 2023-02-19 | End: 2023-02-19

## 2023-02-19 RX ORDER — AMIODARONE HYDROCHLORIDE 200 MG/1
400 TABLET ORAL TWICE DAILY
Status: DISCONTINUED | OUTPATIENT
Start: 2023-02-19 | End: 2023-02-20 | Stop reason: HOSPADM

## 2023-02-19 RX ADMIN — MIDAZOLAM HYDROCHLORIDE 3.5 MG: 1 INJECTION, SOLUTION INTRAMUSCULAR; INTRAVENOUS at 11:02

## 2023-02-19 RX ADMIN — TACROLIMUS 1 MG: 1 CAPSULE ORAL at 06:55

## 2023-02-19 RX ADMIN — FUROSEMIDE 20 MG: 10 INJECTION, SOLUTION INTRAVENOUS at 11:53

## 2023-02-19 RX ADMIN — METOPROLOL TARTRATE 25 MG: 25 TABLET, FILM COATED ORAL at 09:53

## 2023-02-19 RX ADMIN — ACETAMINOPHEN 650 MG: 325 TABLET, FILM COATED ORAL at 00:21

## 2023-02-19 RX ADMIN — Medication 400 MG: at 06:55

## 2023-02-19 RX ADMIN — TACROLIMUS 1 MG: 1 CAPSULE ORAL at 15:12

## 2023-02-19 RX ADMIN — FLUTICASONE PROPIONATE 50 MCG: 50 SPRAY, METERED NASAL at 06:55

## 2023-02-19 RX ADMIN — ATORVASTATIN CALCIUM 40 MG: 40 TABLET, FILM COATED ORAL at 20:56

## 2023-02-19 RX ADMIN — METOPROLOL TARTRATE 5 MG: 1 INJECTION, SOLUTION INTRAVENOUS at 09:21

## 2023-02-19 RX ADMIN — AMIODARONE HYDROCHLORIDE 400 MG: 200 TABLET ORAL at 11:53

## 2023-02-19 RX ADMIN — SODIUM BICARBONATE 1300 MG: 650 TABLET ORAL at 15:13

## 2023-02-19 RX ADMIN — OMEPRAZOLE 20 MG: 20 CAPSULE, DELAYED RELEASE ORAL at 06:55

## 2023-02-19 RX ADMIN — SODIUM BICARBONATE 1300 MG: 650 TABLET ORAL at 20:56

## 2023-02-19 RX ADMIN — LEVOTHYROXINE SODIUM 50 MCG: 0.05 TABLET ORAL at 06:55

## 2023-02-19 RX ADMIN — ASPIRIN 81 MG: 81 TABLET, COATED ORAL at 06:55

## 2023-02-19 RX ADMIN — INSULIN HUMAN 1 UNITS: 100 INJECTION, SOLUTION PARENTERAL at 17:12

## 2023-02-19 RX ADMIN — NITROGLYCERIN 0.4 MG: 0.4 TABLET, ORALLY DISINTEGRATING SUBLINGUAL at 08:08

## 2023-02-19 RX ADMIN — MYCOPHENOLATE MOFETIL 1000 MG: 250 CAPSULE ORAL at 15:12

## 2023-02-19 RX ADMIN — APIXABAN 5 MG: 5 TABLET, FILM COATED ORAL at 20:53

## 2023-02-19 RX ADMIN — APIXABAN 5 MG: 5 TABLET, FILM COATED ORAL at 11:53

## 2023-02-19 RX ADMIN — MYCOPHENOLATE MOFETIL 1000 MG: 250 CAPSULE ORAL at 06:55

## 2023-02-19 RX ADMIN — PREDNISONE 10 MG: 10 TABLET ORAL at 06:55

## 2023-02-19 RX ADMIN — SODIUM BICARBONATE 1300 MG: 650 TABLET ORAL at 08:32

## 2023-02-19 RX ADMIN — SODIUM CHLORIDE 250 ML: 9 INJECTION, SOLUTION INTRAVENOUS at 11:30

## 2023-02-19 RX ADMIN — FENTANYL CITRATE 100 MCG: 50 INJECTION INTRAMUSCULAR; INTRAVENOUS at 10:40

## 2023-02-19 RX ADMIN — INSULIN HUMAN 1 UNITS: 100 INJECTION, SOLUTION PARENTERAL at 21:06

## 2023-02-19 RX ADMIN — GABAPENTIN 100 MG: 100 CAPSULE ORAL at 17:17

## 2023-02-19 ASSESSMENT — ENCOUNTER SYMPTOMS
CHOKING: 0
CHEST TIGHTNESS: 0
DIARRHEA: 0
FEVER: 0
NAUSEA: 0
SHORTNESS OF BREATH: 0
CONSTIPATION: 0
HEADACHES: 0
CHILLS: 0
WHEEZING: 0
COUGH: 0
VOMITING: 0
PALPITATIONS: 1
STRIDOR: 0
APNEA: 0

## 2023-02-19 NOTE — CARE PLAN
The patient is Watcher - Medium risk of patient condition declining or worsening    Shift Goals  Clinical Goals: cardioversion, monitor HR  Patient Goals: pain control  Family Goals: updated on POC at bedside    Progress made toward(s) clinical / shift goals:    Problem: Knowledge Deficit - Standard  Goal: Patient and family/care givers will demonstrate understanding of plan of care, disease process/condition, diagnostic tests and medications  Outcome: Progressing     Problem: Pain - Standard  Goal: Alleviation of pain or a reduction in pain to the patient’s comfort goal  Outcome: Progressing     Problem: Fall Risk  Goal: Patient will remain free from falls  Outcome: Progressing       Patient is not progressing towards the following goals:

## 2023-02-19 NOTE — PROCEDURES
Procedure Performed: [employer]    Procedure physician: Walt Fonseca MD, MPH  Assistant: None    CONSENT: I have discussed the risks and benefits of electrical cardioversion as well as the procedure itself, rationale and appropriateness in detail with the patient today. Complications including but not limited to death, stroke, MI, ACLS, aspiration and complications related to anesthesia were explained to the patient. The potential outcomes associated with the procedure were also discussed at length. The patient agrees to proceed.  utilized.    Pre-procedure diagnosis/Indication: Atrial fibrillation with RVR  Post-procedure diagnosis: Sinus bradycardia 40's bpm    Procedure description: After confirmation of therapeutic anticoagulation and obtaining informed consent, the patient was moderately sedated. Thrombus in the left atrial appendage was excluded with VELMA. I then delivered a synchronized 200 joule biphasic cardioversion pulse in the anterior-posterior vector which  successfully restored sinus rhythm. The patient awoke from sedation without complication.     Moderate sedation:  I supervised moderate sedation administration by the RN between 10:40 AM and 10:49 AM  Fentanyl 100 mcg  Midazolam: 3.5 mg    Specimens: None  EBL: None  Complications: None    Impression  1. Successful cardioversion with restoration of sinus rhythm from Atrial fibrillation. See separate VELMA report.    Walt Fonseca MD, MPH [facility]  Interventional Cardiologist  [facility]

## 2023-02-19 NOTE — CARE PLAN
The patient is Stable - Low risk of patient condition declining or worsening    Shift Goals  Clinical Goals: echo, heparin gtt, possible CV?  Patient Goals: rest    Progress made toward(s) clinical / shift goals:  Progressing      Problem: Knowledge Deficit - Standard  Goal: Patient and family/care givers will demonstrate understanding of plan of care, disease process/condition, diagnostic tests and medications  Outcome: Progressing     Problem: Pain - Standard  Goal: Alleviation of pain or a reduction in pain to the patient’s comfort goal  Outcome: Progressing     Problem: Fall Risk  Goal: Patient will remain free from falls  Outcome: Progressing

## 2023-02-19 NOTE — PROGRESS NOTES
4 Eyes Skin Assessment Completed by TRAVIS Camara and TRAVIS Romo.    Head WDL  Ears WDL  Nose WDL  Mouth WDL  Neck Incision patient has chronic trach since 2007  Breast/Chest Redness and Excoriation  Shoulder Blades Redness  Spine Redness  (R) Arm/Elbow/Hand Redness, Bruising, and Swelling  (L) Arm/Elbow/Hand Redness, Bruising, and Swelling  Abdomen Redness, Rash, Scab, and Abrasion, patient has known wounds on abdomen see photos and wound care order already in   Groin Redness, Excoriation, Abrasion, and Swelling, patient has known wounds on groin see photos and wound care already in   Scrotum/Coccyx/Buttocks Redness, Non-Blanching, and Excoriation known non blanching  (R) Leg Redness and Edema  (L) Leg Redness and Edema  (R) Heel/Foot/Toe Redness, dry and flaky, long toe nails   (L) Heel/Foot/Toe Redness dry and flaky long toe nails           Devices In Places Tele Box, Blood Pressure Cuff, Pulse Ox, and Tracheostomy      Interventions In Place TAP System, Pillows, Q2 Turns, Low Air Loss Mattress, and Barrier Cream    Possible Skin Injury Yes    Pictures Uploaded Into Epic Yes  Wound Consult Placed Yes  RN Wound Prevention Protocol Ordered Yes

## 2023-02-19 NOTE — PROGRESS NOTES
Hospital Medicine Daily Progress Note    Date of Service  2/19/2023    Chief Complaint  Tere Gallegos is a 72 y.o. female admitted 2/18/2023 with AFib RVR    Hospital Course  No notes on file    Interval Problem Update  2/19: Rapid response for chest pain episode/palpitations.  Was tachycardic from A-fib RVR as confirmed on EKG and telemetry.  Cardiology then proceeded with bedside VELMA cardioversion.  This was successful.  Discussed via audio .    I have discussed this patient's plan of care and discharge plan at IDT rounds today with Case Management, Nursing, Nursing leadership, and other members of the IDT team.    Disposition  Patient is not medically cleared for discharge.   Anticipate discharge to to home with close outpatient follow-up.  I have placed the appropriate orders for post-discharge needs.    Review of Systems  Review of Systems   Constitutional:  Negative for chills and fever.   Respiratory:  Negative for cough, shortness of breath and wheezing.    Cardiovascular:  Positive for chest pain and palpitations.   Gastrointestinal:  Negative for constipation, diarrhea, nausea and vomiting.   Genitourinary:  Negative for dysuria.   Neurological:  Negative for headaches.      Physical Exam  Temp:  [36.4 °C (97.5 °F)-36.8 °C (98.2 °F)] 36.6 °C (97.9 °F)  Pulse:  [] 49  Resp:  [16-20] 18  BP: ()/(48-96) 116/57  SpO2:  [93 %-100 %] 95 %    Physical Exam  Constitutional:       Appearance: She is well-developed.   HENT:      Head: Normocephalic.   Cardiovascular:      Rate and Rhythm: Tachycardia present. Rhythm irregular.      Heart sounds:     No gallop.   Pulmonary:      Effort: No respiratory distress.      Breath sounds: No wheezing.   Abdominal:      General: There is no distension.      Tenderness: There is no abdominal tenderness.   Skin:     General: Skin is warm.   Neurological:      Mental Status: She is alert. Mental status is at baseline.      Comments:  Czech-speaking   Psychiatric:      Comments: Unable to assess       Fluids  No intake or output data in the 24 hours ending 02/19/23 1315    Laboratory  Recent Labs     02/17/23 2200 02/18/23 0417 02/19/23  0059   WBC 2.0* 1.7* 2.2*   RBC 4.15* 3.57* 3.76*   HEMOGLOBIN 12.2 10.5* 11.2*   HEMATOCRIT 38.8 34.5* 36.2*   MCV 93.5 96.6 96.3   MCH 29.4 29.4 29.8   MCHC 31.4* 30.4* 30.9*   RDW 44.7 46.3 46.4   PLATELETCT 140* 114* 144*   MPV 13.1* 11.6 13.0*     Recent Labs     02/17/23 2200 02/18/23 0417 02/19/23 0059   SODIUM 139 139 139   POTASSIUM 3.5* 3.4* 4.7   CHLORIDE 101 110 109   CO2 23 18* 18*   GLUCOSE 127* 90 84   BUN 27* 25* 25*   CREATININE 1.17 0.87 1.19   CALCIUM 9.9 8.6 9.5     Recent Labs     02/18/23 0417 02/18/23  1140 02/18/23  1700 02/19/23  0059 02/19/23  0859   APTT 35.6   < > 124.3* 52.1* 119.1*   INR 1.46*  --   --   --   --     < > = values in this interval not displayed.         Recent Labs     02/18/23 0417   TRIGLYCERIDE 144   HDL 31*   LDL 37       Imaging  EC-VELMA W/O CONT         EC-ECHOCARDIOGRAM COMPLETE W/O CONT   Final Result           Assessment/Plan  * Atrial fibrillation with RVR (HCC)- (present on admission)  Assessment & Plan  Improving    May consider adding digoxin and/or amiodarone if persistent hypotension  Target K>4.0 and Mg>2.0    Hold Eliquis  Heparin gtt for now    Consulted cardiology and status post VELMA cardioversion  Beta-blockers per cardiology    Kidney transplant recipient- (present on admission)  Assessment & Plan  S/p renal transplant 10/2022 at [facility]  Continue Prograf, CellCept, prednisone    GERD (gastroesophageal reflux disease)- (present on admission)  Assessment & Plan  PPI    Chest pain  Assessment & Plan  Likely due to uncontrolled afib  Flat troponin  PRN nitro SL, morphine  Already on heparin gtt  Echo this visit shows EF 55% with dilated left atrium while in A-fib RVR.      Presence of drug-eluting stent in right coronary artery- (present on  admission)  Assessment & Plan  ASA/statin/BB  On heparin gtt as above    Acquired hypothyroidism- (present on admission)  Assessment & Plan  synthroid    ESRD s/p kidney transplant 10/31/22- (present on admission)  Assessment & Plan  S/p renal transplant  No longer on HD  bicarb    Chronic heart failure with preserved ejection fraction (HCC)  Assessment & Plan  EF 60% in 11/2022  Not in acute exacerbation    Appears hypovolemic -- s/p 1L NS in ED    Controlled type 2 diabetes mellitus with chronic kidney disease on chronic dialysis, with long-term current use of insulin (HCC)- (present on admission)  Assessment & Plan  Lantus, ISS         VTE prophylaxis: therapeutic anticoagulation with heparin or eliquis    I have performed a physical exam and reviewed and updated ROS and Plan today (2/19/2023). In review of yesterday's note (2/18/2023), there are no changes except as documented above.

## 2023-02-19 NOTE — PROGRESS NOTES
0730 Patient c/o 8/10 chest, L jaw, and L arm pain. EKG done, nitro given per MAR. MD Blanca made aware and at bedside.    0810 Patient states 0/10 pain, states relief with nitro. Bps soft at this time, HR 130s, will give IV metoprolol when pressures improved.    0915 Patients HR 140s sustained, BP stable, IV metoprolol given. NP Gaurav Fairchild made aware. At bedside, NP to initiate PO metoprolol    1010 Patient states she feels like she is going to faint. MD Blanca and NP aGurav Fairchild made aware. RRT called at 1014.    MD Fonseca at bedside, see MD notes for further documentation.

## 2023-02-20 ENCOUNTER — PHARMACY VISIT (OUTPATIENT)
Dept: PHARMACY | Facility: MEDICAL CENTER | Age: 73
End: 2023-02-20
Payer: COMMERCIAL

## 2023-02-20 VITALS
RESPIRATION RATE: 16 BRPM | BODY MASS INDEX: 25.64 KG/M2 | HEIGHT: 59 IN | SYSTOLIC BLOOD PRESSURE: 165 MMHG | OXYGEN SATURATION: 94 % | WEIGHT: 127.21 LBS | HEART RATE: 54 BPM | TEMPERATURE: 98.2 F | DIASTOLIC BLOOD PRESSURE: 68 MMHG

## 2023-02-20 LAB
ALBUMIN SERPL BCP-MCNC: 3.7 G/DL (ref 3.2–4.9)
BUN SERPL-MCNC: 31 MG/DL (ref 8–22)
CALCIUM ALBUM COR SERPL-MCNC: 9.5 MG/DL (ref 8.5–10.5)
CALCIUM SERPL-MCNC: 9.3 MG/DL (ref 8.5–10.5)
CHLORIDE SERPL-SCNC: 107 MMOL/L (ref 96–112)
CO2 SERPL-SCNC: 23 MMOL/L (ref 20–33)
CREAT SERPL-MCNC: 1.43 MG/DL (ref 0.5–1.4)
EKG IMPRESSION: NORMAL
ERYTHROCYTE [DISTWIDTH] IN BLOOD BY AUTOMATED COUNT: 45.5 FL (ref 35.9–50)
GFR SERPLBLD CREATININE-BSD FMLA CKD-EPI: 39 ML/MIN/1.73 M 2
GLUCOSE BLD STRIP.AUTO-MCNC: 128 MG/DL (ref 65–99)
GLUCOSE BLD STRIP.AUTO-MCNC: 92 MG/DL (ref 65–99)
GLUCOSE SERPL-MCNC: 105 MG/DL (ref 65–99)
HCT VFR BLD AUTO: 31.8 % (ref 37–47)
HGB BLD-MCNC: 10.1 G/DL (ref 12–16)
MCH RBC QN AUTO: 30.1 PG (ref 27–33)
MCHC RBC AUTO-ENTMCNC: 31.8 G/DL (ref 33.6–35)
MCV RBC AUTO: 94.9 FL (ref 81.4–97.8)
PHOSPHATE SERPL-MCNC: 2.5 MG/DL (ref 2.5–4.5)
PLATELET # BLD AUTO: 112 K/UL (ref 164–446)
PMV BLD AUTO: 12.2 FL (ref 9–12.9)
POTASSIUM SERPL-SCNC: 4.8 MMOL/L (ref 3.6–5.5)
RBC # BLD AUTO: 3.35 M/UL (ref 4.2–5.4)
SODIUM SERPL-SCNC: 140 MMOL/L (ref 135–145)
WBC # BLD AUTO: 1.8 K/UL (ref 4.8–10.8)

## 2023-02-20 PROCEDURE — 700111 HCHG RX REV CODE 636 W/ 250 OVERRIDE (IP): Performed by: STUDENT IN AN ORGANIZED HEALTH CARE EDUCATION/TRAINING PROGRAM

## 2023-02-20 PROCEDURE — A9270 NON-COVERED ITEM OR SERVICE: HCPCS | Performed by: NURSE PRACTITIONER

## 2023-02-20 PROCEDURE — A9270 NON-COVERED ITEM OR SERVICE: HCPCS | Performed by: STUDENT IN AN ORGANIZED HEALTH CARE EDUCATION/TRAINING PROGRAM

## 2023-02-20 PROCEDURE — 700102 HCHG RX REV CODE 250 W/ 637 OVERRIDE(OP): Performed by: NURSE PRACTITIONER

## 2023-02-20 PROCEDURE — 85027 COMPLETE CBC AUTOMATED: CPT

## 2023-02-20 PROCEDURE — 700102 HCHG RX REV CODE 250 W/ 637 OVERRIDE(OP): Performed by: STUDENT IN AN ORGANIZED HEALTH CARE EDUCATION/TRAINING PROGRAM

## 2023-02-20 PROCEDURE — RXMED WILLOW AMBULATORY MEDICATION CHARGE: Performed by: HOSPITALIST

## 2023-02-20 PROCEDURE — 80069 RENAL FUNCTION PANEL: CPT

## 2023-02-20 PROCEDURE — 82962 GLUCOSE BLOOD TEST: CPT | Mod: 91

## 2023-02-20 PROCEDURE — 99239 HOSP IP/OBS DSCHRG MGMT >30: CPT | Performed by: HOSPITALIST

## 2023-02-20 RX ORDER — AMLODIPINE BESYLATE 10 MG/1
10 TABLET ORAL DAILY
Status: DISCONTINUED | OUTPATIENT
Start: 2023-02-20 | End: 2023-02-20 | Stop reason: HOSPADM

## 2023-02-20 RX ORDER — METOPROLOL SUCCINATE 25 MG/1
25 TABLET, EXTENDED RELEASE ORAL EVERY EVENING
Status: DISCONTINUED | OUTPATIENT
Start: 2023-02-20 | End: 2023-02-20 | Stop reason: HOSPADM

## 2023-02-20 RX ORDER — METOPROLOL SUCCINATE 25 MG/1
25 TABLET, EXTENDED RELEASE ORAL EVERY EVENING
Qty: 30 TABLET | Refills: 2 | Status: SHIPPED | OUTPATIENT
Start: 2023-02-20 | End: 2023-03-22 | Stop reason: SDUPTHER

## 2023-02-20 RX ORDER — AMIODARONE HYDROCHLORIDE 200 MG/1
200 TABLET ORAL
Status: DISCONTINUED | OUTPATIENT
Start: 2023-03-05 | End: 2023-02-20 | Stop reason: HOSPADM

## 2023-02-20 RX ORDER — AMIODARONE HYDROCHLORIDE 200 MG/1
400 TABLET ORAL
Status: DISCONTINUED | OUTPATIENT
Start: 2023-02-26 | End: 2023-02-20 | Stop reason: HOSPADM

## 2023-02-20 RX ORDER — AMIODARONE HYDROCHLORIDE 200 MG/1
TABLET ORAL
Qty: 58 TABLET | Refills: 2 | Status: SHIPPED | OUTPATIENT
Start: 2023-02-20 | End: 2023-03-27

## 2023-02-20 RX ADMIN — OMEPRAZOLE 20 MG: 20 CAPSULE, DELAYED RELEASE ORAL at 06:00

## 2023-02-20 RX ADMIN — APIXABAN 5 MG: 5 TABLET, FILM COATED ORAL at 06:00

## 2023-02-20 RX ADMIN — PREDNISONE 10 MG: 10 TABLET ORAL at 06:01

## 2023-02-20 RX ADMIN — INSULIN GLARGINE-YFGN 8 UNITS: 100 INJECTION, SOLUTION SUBCUTANEOUS at 06:04

## 2023-02-20 RX ADMIN — MYCOPHENOLATE MOFETIL 1000 MG: 250 CAPSULE ORAL at 06:01

## 2023-02-20 RX ADMIN — AMLODIPINE BESYLATE 10 MG: 10 TABLET ORAL at 09:46

## 2023-02-20 RX ADMIN — AMIODARONE HYDROCHLORIDE 400 MG: 200 TABLET ORAL at 09:46

## 2023-02-20 RX ADMIN — SODIUM BICARBONATE 1300 MG: 650 TABLET ORAL at 08:16

## 2023-02-20 RX ADMIN — ASPIRIN 81 MG: 81 TABLET, COATED ORAL at 05:59

## 2023-02-20 RX ADMIN — TACROLIMUS 1 MG: 1 CAPSULE ORAL at 05:59

## 2023-02-20 RX ADMIN — LEVOTHYROXINE SODIUM 50 MCG: 0.05 TABLET ORAL at 06:00

## 2023-02-20 RX ADMIN — Medication 400 MG: at 06:00

## 2023-02-20 ASSESSMENT — ENCOUNTER SYMPTOMS
CHILLS: 0
CHEST TIGHTNESS: 0
NERVOUS/ANXIOUS: 0
SHORTNESS OF BREATH: 0
ABDOMINAL DISTENTION: 0
FEVER: 0
COUGH: 0
CONFUSION: 0
BLOOD IN STOOL: 0
PALPITATIONS: 0
AGITATION: 0
ABDOMINAL PAIN: 0
DIZZINESS: 0
COLOR CHANGE: 0
TROUBLE SWALLOWING: 0
DIAPHORESIS: 0
NUMBNESS: 0

## 2023-02-20 ASSESSMENT — PAIN DESCRIPTION - PAIN TYPE: TYPE: ACUTE PAIN

## 2023-02-20 NOTE — PROGRESS NOTES
Assumed care at 0700. Bedside report received from Maria Ines. Patient's chart and MAR reviewed. Pt denies pain at this time. Pt is A & O 4. Patient was updated on plan of care for the day. Questions answered and concerns addressed.  Pt denies any additional needs at this time. White board updated. Call light, phone and personal belongings within reach.

## 2023-02-20 NOTE — DISCHARGE INSTRUCTIONS
Atrial Fibrillation    Atrial fibrillation is a type of heartbeat that is irregular or fast (rapid). If you have this condition, your heart beats without any order. This makes it hard for your heart to pump blood in a normal way. Having this condition gives you more risk for stroke, heart failure, and other heart problems.  Atrial fibrillation may start all of a sudden and then stop on its own, or it may become a long-lasting problem.  What are the causes?  This condition may be caused by heart conditions, such as:  High blood pressure.  Heart failure.  Heart valve disease.  Heart surgery.  Other causes include:  Pneumonia.  Obstructive sleep apnea.  Lung cancer.  Thyroid disease.  Drinking too much alcohol.  Sometimes the cause is not known.  What increases the risk?  You are more likely to develop this condition if:  You smoke.  You are older.  You have diabetes.  You are overweight.  You have a family history of this condition.  You exercise often and hard.  What are the signs or symptoms?  Common symptoms of this condition include:  A feeling like your heart is beating very fast.  Chest pain.  Feeling short of breath.  Feeling light-headed or weak.  Getting tired easily.  Follow these instructions at home:  Medicines  Take over-the-counter and prescription medicines only as told by your doctor.  If your doctor gives you a blood-thinning medicine, take it exactly as told. Taking too much of it can cause bleeding. Taking too little of it does not protect you against clots. Clots can cause a stroke.  Lifestyle         Do not use any tobacco products. These include cigarettes, chewing tobacco, and e-cigarettes. If you need help quitting, ask your doctor.  Do not drink alcohol.  Do not drink beverages that have caffeine. These include coffee, soda, and tea.  Follow diet instructions as told by your doctor.  Exercise regularly as told by your doctor.  General instructions  If you have a condition that causes breathing  "to stop for a short period of time (apnea), treat it as told by your doctor.  Keep a healthy weight. Do not use diet pills unless your doctor says they are safe for you. Diet pills may make heart problems worse.  Keep all follow-up visits as told by your doctor. This is important.  Contact a doctor if:  You notice a change in the speed, rhythm, or strength of your heartbeat.  You are taking a blood-thinning medicine and you see more bruising.  You get tired more easily when you move or exercise.  You have a sudden change in weight.  Get help right away if:    You have pain in your chest or your belly (abdomen).  You have trouble breathing.  You have blood in your vomit, poop, or pee (urine).  You have any signs of a stroke. \"BE FAST\" is an easy way to remember the main warning signs:  B - Balance. Signs are dizziness, sudden trouble walking, or loss of balance.  E - Eyes. Signs are trouble seeing or a change in how you see.  F - Face. Signs are sudden weakness or loss of feeling in the face, or the face or eyelid drooping on one side.  A - Arms. Signs are weakness or loss of feeling in an arm. This happens suddenly and usually on one side of the body.  S - Speech. Signs are sudden trouble speaking, slurred speech, or trouble understanding what people say.  T - Time. Time to call emergency services. Write down what time symptoms started.  You have other signs of a stroke, such as:  A sudden, very bad headache with no known cause.  Feeling sick to your stomach (nausea).  Throwing up (vomiting).  Jerky movements you cannot control (seizure).  These symptoms may be an emergency. Do not wait to see if the symptoms will go away. Get medical help right away. Call your local emergency services (911 in the [place] Do not drive yourself to the hospital.  Summary  Atrial fibrillation is a type of heartbeat that is irregular or fast (rapid).  You are at higher risk of this condition if you smoke, are older, have diabetes, or are " overweight.  Follow your doctor's instructions about medicines, diet, exercise, and follow-up visits.  Get help right away if you think that you have signs of a stroke.  This information is not intended to replace advice given to you by your health care provider. Make sure you discuss any questions you have with your health care provider.  Document Released: 09/26/2009 Document Revised: 02/21/2019 Document Reviewed: 02/08/2019  [employer] Patient Education © 2020 [employer]  Amiodarone tablets  What is this medicine?  AMIODARONE (a RADHA [employer]) is an antiarrhythmic drug. It helps make your heart beat regularly. Because of the side effects caused by this medicine, it is only used when other medicines have not worked. It is usually used for heartbeat problems that may be life threatening.  This medicine may be used for other purposes; ask your health care provider or pharmacist if you have questions.  COMMON BRAND NAME(S): Cordarone, Pacerone  What should I tell my health care provider before I take this medicine?  They need to know if you have any of these conditions:  liver disease  lung disease  other heart problems  thyroid disease  an unusual or allergic reaction to amiodarone, iodine, other medicines, foods, dyes, or preservatives  pregnant or trying to get pregnant  breast-feeding  How should I use this medicine?  Take this medicine by mouth with a glass of water. Follow the directions on the prescription label. You can take this medicine with or without food. However, you should always take it the same way each time. Take your doses at regular intervals. Do not take your medicine more often than directed. Do not stop taking except on the advice of your doctor or health care professional.  A special MedGuide will be given to you by the pharmacist with each prescription and refill. Be sure to read this information carefully each time.  Talk to your pediatrician regarding the use of this medicine in children.  Special care may be needed.  Overdosage: If you think you have taken too much of this medicine contact a poison control center or emergency room at once.  NOTE: This medicine is only for you. Do not share this medicine with others.  What if I miss a dose?  If you miss a dose, take it as soon as you can. If it is almost time for your next dose, take only that dose. Do not take double or extra doses.  What may interact with this medicine?  Do not take this medicine with any of the following medications:  abarelix  apomorphine  arsenic trioxide  certain antibiotics like erythromycin, gemifloxacin, levofloxacin, pentamidine  certain medicines for depression like amoxapine, tricyclic antidepressants  certain medicines for fungal infections like fluconazole, itraconazole, ketoconazole, posaconazole, voriconazole  certain medicines for irregular heart beat like disopyramide, dronedarone, ibutilide, propafenone, sotalol  certain medicines for malaria like chloroquine, halofantrine  cisapride  droperidol  haloperidol  hawthorn  maprotiline  methadone  phenothiazines like chlorpromazine, mesoridazine, thioridazine  pimozide  ranolazine  red yeast rice  vardenafil  This medicine may also interact with the following medications:  antiviral medicines for HIV or AIDS  certain medicines for blood pressure, heart disease, irregular heart beat  certain medicines for cholesterol like atorvastatin, cerivastatin, lovastatin, simvastatin  certain medicines for hepatitis C like sofosbuvir and ledipasvir; sofosbuvir  certain medicines for seizures like phenytoin  certain medicines for thyroid problems  certain medicines that treat or prevent blood clots like warfarin  cholestyramine  cimetidine  clopidogrel  cyclosporine  dextromethorphan  diuretics  dofetilide  fentanyl  general anesthetics  grapefruit juice  lidocaine  loratadine  methotrexate  other medicines that prolong the QT interval (cause an abnormal heart  rhythm)  procainamide  quinidine  rifabutin, rifampin, or rifapentine  Eloina's Wort  trazodone  ziprasidone  This list may not describe all possible interactions. Give your health care provider a list of all the medicines, herbs, non-prescription drugs, or dietary supplements you use. Also tell them if you smoke, drink alcohol, or use illegal drugs. Some items may interact with your medicine.  What should I watch for while using this medicine?  Your condition will be monitored closely when you first begin therapy. Often, this drug is first started in a hospital or other monitored health care setting. Once you are on maintenance therapy, visit your doctor or health care professional for regular checks on your progress. Because your condition and use of this medicine carry some risk, it is a good idea to carry an identification card, necklace or bracelet with details of your condition, medications, and doctor or health care professional.  You may get drowsy or dizzy. Do not drive, use machinery, or do anything that needs mental alertness until you know how this medicine affects you. Do not stand or sit up quickly, especially if you are an older patient. This reduces the risk of dizzy or fainting spells.  This medicine can make you more sensitive to the sun. Keep out of the sun. If you cannot avoid being in the sun, wear protective clothing and use sunscreen. Do not use sun lamps or tanning beds/booths.  You should have regular eye exams before and during treatment. Call your doctor if you have blurred vision, see halos, or your eyes become sensitive to light. Your eyes may get dry. It may be helpful to use a lubricating eye solution or artificial tears solution.  If you are going to have surgery or a procedure that requires contrast dyes, tell your doctor or health care professional that you are taking this medicine.  What side effects may I notice from receiving this medicine?  Side effects that you should report  to your doctor or health care professional as soon as possible:  allergic reactions like skin rash, itching or hives, swelling of the face, lips, or tongue  blue-gray coloring of the skin  blurred vision, seeing blue green halos, increased sensitivity of the eyes to light  breathing problems  chest pain  dark urine  fast, irregular heartbeat  feeling faint or light-headed  intolerance to heat or cold  nausea or vomiting  pain and swelling of the scrotum  pain, tingling, numbness in feet, hands  redness, blistering, peeling or loosening of the skin, including inside the mouth  spitting up blood  stomach pain  sweating  unusual or uncontrolled movements of body  unusually weak or tired  weight gain or loss  yellowing of the eyes or skin  Side effects that usually do not require medical attention (report to your doctor or health care professional if they continue or are bothersome):  change in sex drive or performance  constipation  dizziness  headache  loss of appetite  trouble sleeping  This list may not describe all possible side effects. Call your doctor for medical advice about side effects. You may report side effects to FDA at 8-789-FDA-4622.  Where should I keep my medicine?  Keep out of the reach of children.  Store at room temperature between 20 and 25 degrees C (68 and 77 degrees F). Protect from light. Keep container tightly closed. Throw away any unused medicine after the expiration date.  NOTE: This sheet is a summary. It may not cover all possible information. If you have questions about this medicine, talk to your doctor, pharmacist, or health care provider.  © 2020 [employer] (2019-11-20 13:44:04)  Metoprolol tablets  What is this medicine?  METOPROLOL (me TOE proe lole) is a beta-blocker. Beta-blockers reduce the workload on the heart and help it to beat more regularly. This medicine is used to treat high blood pressure and to prevent chest pain. It is also used to after a heart attack and  to prevent an additional heart attack from occurring.  This medicine may be used for other purposes; ask your health care provider or pharmacist if you have questions.  COMMON BRAND NAME(S): Lopressor  What should I tell my health care provider before I take this medicine?  They need to know if you have any of these conditions:  diabetes  heart or vessel disease like slow heart rate, worsening heart failure, heart block, sick sinus syndrome or Raynaud's disease  kidney disease  liver disease  lung or breathing disease, like asthma or emphysema  pheochromocytoma  thyroid disease  an unusual or allergic reaction to metoprolol, other beta-blockers, medicines, foods, dyes, or preservatives  pregnant or trying to get pregnant  breast-feeding  How should I use this medicine?  Take this medicine by mouth with a drink of water. Follow the directions on the prescription label. Take this medicine immediately after meals. Take your doses at regular intervals. Do not take more medicine than directed. Do not stop taking this medicine suddenly. This could lead to serious heart-related effects.  Talk to your pediatrician regarding the use of this medicine in children. Special care may be needed.  Overdosage: If you think you have taken too much of this medicine contact a poison control center or emergency room at once.  NOTE: This medicine is only for you. Do not share this medicine with others.  What if I miss a dose?  If you miss a dose, take it as soon as you can. If it is almost time for your next dose, take only that dose. Do not take double or extra doses.  What may interact with this medicine?  This medicine may interact with the following medications:  certain medicines for blood pressure, heart disease, irregular heart beat  certain medicines for depression like monoamine oxidase (MAO) inhibitors, fluoxetine, or paroxetine  clonidine  dobutamine  epinephrine  isoproterenol  reserpine  This list may not describe all  possible interactions. Give your health care provider a list of all the medicines, herbs, non-prescription drugs, or dietary supplements you use. Also tell them if you smoke, drink alcohol, or use illegal drugs. Some items may interact with your medicine.  What should I watch for while using this medicine?  Visit your doctor or health care professional for regular check ups. Contact your doctor right away if your symptoms worsen. Check your blood pressure and pulse rate regularly. Ask your health care professional what your blood pressure and pulse rate should be, and when you should contact them.  You may get drowsy or dizzy. Do not drive, use machinery, or do anything that needs mental alertness until you know how this medicine affects you. Do not sit or stand up quickly, especially if you are an older patient. This reduces the risk of dizzy or fainting spells. Contact your doctor if these symptoms continue. Alcohol may interfere with the effect of this medicine. Avoid alcoholic drinks.  This medicine may increase blood sugar. Ask your healthcare provider if changes in diet or medicines are needed if you have diabetes.  What side effects may I notice from receiving this medicine?  Side effects that you should report to your doctor or health care professional as soon as possible:  allergic reactions like skin rash, itching or hives  cold or numb hands or feet  depression  difficulty breathing  faint  fever with sore throat  irregular heartbeat, chest pain  rapid weight gain    signs and symptoms of high blood sugar such as being more thirsty or hungry or having to urinate more than normal. You may also feel very tired or have blurry vision.  swollen legs or ankles  Side effects that usually do not require medical attention (report to your doctor or health care professional if they continue or are bothersome):  anxiety or nervousness  change in sex drive or performance  dry skin  headache  nightmares or trouble  sleeping  short term memory loss  stomach upset or diarrhea  This list may not describe all possible side effects. Call your doctor for medical advice about side effects. You may report side effects to FDA at 1-880-UOD-2621.  Where should I keep my medicine?  Keep out of the reach of children.  Store at room temperature between 15 and 30 degrees C (59 and 86 degrees F). Throw away any unused medicine after the expiration date.  NOTE: This sheet is a summary. It may not cover all possible information. If you have questions about this medicine, talk to your doctor, pharmacist, or health care provider.  © 2020 [employer]/Gold Standard (2019-10-08 11:15:23)

## 2023-02-20 NOTE — PROGRESS NOTES
Cardiology Follow Up Progress Note    Date of Service  2/20/2023    Attending Physician  Evaristo Blanca M.D.    Chief Complaint   Chest pain     HPI  Tere Gallegos is a 72 y.o. female admitted 2/18/2023 with past medical history of HFpEF, PAF, on OAS with eliquis ,CAD prior PCI RCA, renal transplant at [facility] on immunosuppressants.    Presented with chest pain and noted to be in afib with RVR    Interim Events  Denies any chest pain or SOB  Remaining in NSR s/p cardioversion  Blood pressure this morning elevated    Review of Systems  Review of Systems   Constitutional:  Negative for chills, diaphoresis and fever.   HENT:  Negative for nosebleeds and trouble swallowing.    Respiratory:  Negative for cough, chest tightness and shortness of breath.    Cardiovascular:  Negative for chest pain, palpitations and leg swelling.   Gastrointestinal:  Negative for abdominal distention, abdominal pain and blood in stool.   Genitourinary:  Negative for hematuria.   Skin:  Negative for color change.   Neurological:  Negative for dizziness, syncope and numbness.   Psychiatric/Behavioral:  Negative for agitation and confusion. The patient is not nervous/anxious.      Vital signs in last 24 hours  Temp:  [36.2 °C (97.2 °F)-36.8 °C (98.2 °F)] 36.8 °C (98.2 °F)  Pulse:  [] 54  Resp:  [16-18] 16  BP: ()/(48-96) 165/68  SpO2:  [92 %-100 %] 94 %    Physical Exam  Physical Exam  Vitals and nursing note reviewed.   Constitutional:       Appearance: Normal appearance.   HENT:      Head: Normocephalic and atraumatic.   Eyes:      Pupils: Pupils are equal, round, and reactive to light.   Cardiovascular:      Rate and Rhythm: Normal rate and regular rhythm.      Heart sounds: Normal heart sounds. No murmur heard.  Pulmonary:      Effort: Pulmonary effort is normal.      Breath sounds: Normal breath sounds.   Abdominal:      General: Abdomen is flat.   Musculoskeletal:      Cervical back: Normal range of motion.   Skin:      General: Skin is warm and dry.   Neurological:      General: No focal deficit present.      Mental Status: She is alert and oriented to person, place, and time.   Psychiatric:         Mood and Affect: Mood normal.         Behavior: Behavior normal.         Thought Content: Thought content normal.         Judgment: Judgment normal.       Lab Review  Lab Results   Component Value Date/Time    WBC 1.8 (LL) 02/20/2023 02:48 AM    RBC 3.35 (L) 02/20/2023 02:48 AM    HEMOGLOBIN 10.1 (L) 02/20/2023 02:48 AM    HEMATOCRIT 31.8 (L) 02/20/2023 02:48 AM    MCV 94.9 02/20/2023 02:48 AM    MCH 30.1 02/20/2023 02:48 AM    MCHC 31.8 (L) 02/20/2023 02:48 AM    MPV 12.2 02/20/2023 02:48 AM      Lab Results   Component Value Date/Time    SODIUM 140 02/20/2023 02:48 AM    POTASSIUM 4.8 02/20/2023 02:48 AM    CHLORIDE 107 02/20/2023 02:48 AM    CO2 23 02/20/2023 02:48 AM    GLUCOSE 105 (H) 02/20/2023 02:48 AM    BUN 31 (H) 02/20/2023 02:48 AM    CREATININE 1.43 (H) 02/20/2023 02:48 AM    BUNCREATRAT 8 (L) 01/28/2021 07:00 AM      Lab Results   Component Value Date/Time    ASTSGOT 22 02/17/2023 10:00 PM    ALTSGPT 16 02/17/2023 10:00 PM     Lab Results   Component Value Date/Time    CHOLSTRLTOT 97 (L) 02/18/2023 04:17 AM    LDL 37 02/18/2023 04:17 AM    HDL 31 (A) 02/18/2023 04:17 AM    TRIGLYCERIDE 144 02/18/2023 04:17 AM    TROPONINT 50 (H) 02/18/2023 07:43 AM       Recent Labs     02/18/23  0417   NTPROBNP [id number]*       Cardiac Imaging and Procedures Review  Telemetry showed NSB    Echocardiogram:    2/18/2023  Left Ventricle  Normal left ventricular chamber size. Mild concentric left ventricular   hypertrophy. Normal left ventricular systolic function. The left   ventricular ejection fraction is visually estimated to be 55%. Normal   regional wall motion. Diastolic function is difficult to assess with   arrhythmia.     Right Ventricle  The right ventricle is normal in size and systolic function.     Right Atrium  The right atrium is  normal in size. Normal inferior vena cava size   without inspiratory collapse.     Left Atrium  Mildly dilated left atrium. Left atrial volume index is 40 mL/sq m.     Mitral Valve  Mild mitral annular calcification. No mitral stenosis. No mitral   regurgitation.     Aortic Valve  Structurally normal aortic valve without significant stenosis or   regurgitation.     Tricuspid Valve  Structurally normal tricuspid valve without significant stenosis or   regurgitation. Unable to estimate right ventricular systolic pressure   due to an inadequate tricuspid regurgitant jet.     Pulmonic Valve  No pulmonic stenosis. Trace pulmonic insufficiency.     Pericardium  No pericardial effusion.     Aorta  Normal aortic root for body surface area. The ascending aorta diameter   is 2.9 cm.    Assessment/Plan  No new Assessment & Plan notes have been filed under this hospital service since the last note was generated.  Service: Cardiology    #A-fib with RVR, s/p successful VELMA/CV 2/19/2023  # Hx of PAF on OAC with Eliquis   # Known CAD PCI RCA  #HFpEF, acute on chronic  #NT proBNP 14,000 on admission  #Status post renal transplant  # LVEF 55%  # DM2, A1c 6     Recommendations  -changed metoprolol tartrate to succinate 25mg XL   - continue amiodarone 400mg BID for 1 week, then 400mg qd for 1 week therefore 200mg qd   -Continue eliquis 5mg BID   - stop asa therapy   - continue Lipitor  - ECHO showed ef 55%  - resume amlodipine 10mg qd and losartan 50 mg qd home dosage     I personally spent a total of 12 minutes which includes face-to-face time and non-face-to-face time spent on preparing to see the patient, reviewing hospital notes and tests, obtaining history from the patient, performing a medically appropriate exam, counseling and educating the patient, ordering medications/tests/procedures/referrals as clinically indicated, and documenting information in the electronic medical record.    Future Appointments   Date Time Provider  Department Center   2/22/2023  2:00 PM NON PROVIDER-[facility] None   3/3/2023  1:30 PM ANGELITA Ortega [facility] None   3/3/2023  3:00 PM ANGELITA GuzmánR [facility]   3/8/2023  3:20 PM ANGELITA [facility]   6/27/2023  4:00 PM ANGELITA Fisher [facility] None   8/7/2023  4:45 PM V EXAM 4 [facility] None         Thank you for allowing me to participate in the care of this patient.  Cardiology will sign off on this patient    Please contact me with any questions.    ANGELITA Rowe

## 2023-02-20 NOTE — CARE PLAN
The patient is Watcher - Medium risk of patient condition declining or worsening    Shift Goals  Clinical Goals: cardioversion, monitor HR  Patient Goals: pain control  Family Goals: updated on POC at bedside    Progress made toward(s) clinical / shift goals:      Problem: Knowledge Deficit - Standard  Goal: Patient and family/care givers will demonstrate understanding of plan of care, disease process/condition, diagnostic tests and medications  Outcome: Progressing     Problem: Pain - Standard  Goal: Alleviation of pain or a reduction in pain to the patient’s comfort goal  Outcome: Progressing     Problem: Fall Risk  Goal: Patient will remain free from falls  Outcome: Progressing

## 2023-02-20 NOTE — PROGRESS NOTES
Monitor Summary  Afib 135-148  Cardioverted via procedural notes  SB 47-51   (R) PVC, (R) PAC  .18/.04/.46

## 2023-02-20 NOTE — PROGRESS NOTES
Arrive to dcl via wheelchair. A/O, dc instructions reviewed w/ pt and relatives. Verbalized understanding. Meds to bed delivered.

## 2023-02-21 ENCOUNTER — HOSPITAL ENCOUNTER (OUTPATIENT)
Dept: LAB | Facility: MEDICAL CENTER | Age: 73
End: 2023-02-21
Attending: INTERNAL MEDICINE
Payer: MEDICARE

## 2023-02-21 ENCOUNTER — PATIENT OUTREACH (OUTPATIENT)
Dept: MEDICAL GROUP | Facility: MEDICAL CENTER | Age: 73
End: 2023-02-21
Payer: MEDICARE

## 2023-02-21 LAB
ANION GAP SERPL CALC-SCNC: 11 MMOL/L (ref 7–16)
BASOPHILS # BLD AUTO: 1 % (ref 0–1.8)
BASOPHILS # BLD: 0.02 K/UL (ref 0–0.12)
BUN SERPL-MCNC: 29 MG/DL (ref 8–22)
CALCIUM SERPL-MCNC: 9.7 MG/DL (ref 8.4–10.2)
CHLORIDE SERPL-SCNC: 108 MMOL/L (ref 96–112)
CO2 SERPL-SCNC: 23 MMOL/L (ref 20–33)
CREAT SERPL-MCNC: 1.64 MG/DL (ref 0.5–1.4)
EOSINOPHIL # BLD AUTO: 0.02 K/UL (ref 0–0.51)
EOSINOPHIL NFR BLD: 1 % (ref 0–6.9)
ERYTHROCYTE [DISTWIDTH] IN BLOOD BY AUTOMATED COUNT: 44.8 FL (ref 35.9–50)
GFR SERPLBLD CREATININE-BSD FMLA CKD-EPI: 33 ML/MIN/1.73 M 2
GLUCOSE SERPL-MCNC: 101 MG/DL (ref 65–99)
HCT VFR BLD AUTO: 32.3 % (ref 37–47)
HGB BLD-MCNC: 10 G/DL (ref 12–16)
LYMPHOCYTES # BLD AUTO: 0.54 K/UL (ref 1–4.8)
LYMPHOCYTES NFR BLD: 27 % (ref 22–41)
MANUAL DIFF BLD: NORMAL
MCH RBC QN AUTO: 29.2 PG (ref 27–33)
MCHC RBC AUTO-ENTMCNC: 31 G/DL (ref 33.6–35)
MCV RBC AUTO: 94.2 FL (ref 81.4–97.8)
MONOCYTES # BLD AUTO: 0.24 K/UL (ref 0–0.85)
MONOCYTES NFR BLD AUTO: 12 % (ref 0–13.4)
NEUTROPHILS # BLD AUTO: 1.18 K/UL (ref 2–7.15)
NEUTROPHILS NFR BLD: 59 % (ref 44–72)
NRBC # BLD AUTO: 0 K/UL
NRBC BLD-RTO: 0 /100 WBC
OVALOCYTES BLD QL SMEAR: NORMAL
PHOSPHATE SERPL-MCNC: 2.3 MG/DL (ref 2.5–4.5)
PLATELET # BLD AUTO: 108 K/UL (ref 164–446)
PLATELET BLD QL SMEAR: NORMAL
PMV BLD AUTO: 10.9 FL (ref 9–12.9)
POTASSIUM SERPL-SCNC: 4.1 MMOL/L (ref 3.6–5.5)
RBC # BLD AUTO: 3.43 M/UL (ref 4.2–5.4)
RBC BLD AUTO: PRESENT
SODIUM SERPL-SCNC: 142 MMOL/L (ref 135–145)
WBC # BLD AUTO: 2 K/UL (ref 4.8–10.8)

## 2023-02-21 PROCEDURE — 80048 BASIC METABOLIC PNL TOTAL CA: CPT

## 2023-02-21 PROCEDURE — 85025 COMPLETE CBC W/AUTO DIFF WBC: CPT

## 2023-02-21 PROCEDURE — 80197 ASSAY OF TACROLIMUS: CPT

## 2023-02-21 PROCEDURE — 84100 ASSAY OF PHOSPHORUS: CPT

## 2023-02-21 PROCEDURE — 36415 COLL VENOUS BLD VENIPUNCTURE: CPT

## 2023-02-21 PROCEDURE — 85007 BL SMEAR W/DIFF WBC COUNT: CPT

## 2023-02-21 NOTE — PROGRESS NOTES
Patient outreach for TCM follow up completed with the patient's daughter, Catie.  Reviewed discharge instructions and new medications. Unable to perform a complete medication review, as Catie was not with the patient. Catie verbalized understanding.  Confirmed follow up appointment for 03/08/23, appointment time changed to 1500 to accommodate HFV .

## 2023-02-21 NOTE — DISCHARGE SUMMARY
Hospital Medicine Discharge Note     Admit Date:  2/18/2023       Discharge Date:   2/20/2023    Attending Physician:  Evaristo Blanca M.D.     Diagnoses (includes active and resolved):   Principal Problem:    Atrial fibrillation with RVR (HCC) POA: Yes  Active Problems:    Controlled type 2 diabetes mellitus with chronic kidney disease on chronic dialysis, with long-term current use of insulin (HCC) POA: Yes    Chronic heart failure with preserved ejection fraction (HCC) POA: Unknown    ESRD s/p kidney transplant 10/31/22 POA: Yes    Acquired hypothyroidism POA: Yes    Presence of drug-eluting stent in right coronary artery (Chronic) POA: Yes    Chest pain POA: Unknown    GERD (gastroesophageal reflux disease) POA: Yes    Kidney transplant recipient POA: Yes  Resolved Problems:    * No resolved hospital problems. *      Hospital Summary (Brief Narrative):       72 y.o.  female with history of ESRD previously on dialysis now s/p renal transplant [facility], chronic atrial fibrillation on Eliquis, HFpEF, CAD, diabetes, hypertension, hyperlipidemia who presented 2/18/2023 as a direct transfer from [facility] for chest pain and arrhythmia.      She was found to have A-fib with RVR with rate varying from 110-130s, soft BP in [facility].   She was transferred to [facility].  She was seen by cardiology.  She continued to be tachycardic and developed chest pain and palpitations.  She underwent successful VELMA DC cardioversion.  She was started on amiodarone load and metoprolol.  She was continued on her apixaban.      No notes on file  The patient met 2-midnight criteria for an inpatient stay at the time of discharge.  Audio  Tom [id number]     Consultants:      Electrophysiologist Dr. Richardson    Procedures:        TEE_DCCV    Discharge Medications:           Medication List        START taking these medications        Instructions   amiodarone 200 MG Tabs  Start taking on: February 20, 2023  Commonly known  as: Cordarone   Take 2 Tablets by mouth 2 times a day for 7 days, THEN 2 Tablets every day for 7 days, THEN 1 Tablet every day for 300 days.     metoprolol SR 25 MG Tb24  Commonly known as: TOPROL XL   Take 1 Tablet by mouth every evening.  Dose: 25 mg            CONTINUE taking these medications        Instructions   amLODIPine 10 MG Tabs  Commonly known as: NORVASC   Take 10 mg by mouth every day.  Dose: 10 mg     apixaban 5 MG Tabs tablet  Commonly known as: ELIQUIS   Take 1 Tablet by mouth 2 times a day.  Dose: 5 mg     atorvastatin 20 MG Tabs  Commonly known as: LIPITOR   Take 1 Tablet by mouth every evening.  Dose: 20 mg     BD Pen Needle Yasmeen 2nd Gen  Generic drug: Insulin Pen Needle 32 G x 4 mm   USE AS DIRECTED WITH INSULIN PENS THREE TIMES DAILY BEFORE A MEAL     docusate sodium 100 MG Caps  Commonly known as: COLACE   Take 100-200 mg by mouth 2 times a day as needed for Constipation. Indications: Constipation  Dose: 100-200 mg     doxazosin 1 MG Tabs  Commonly known as: CARDURA   Take 1 Tablet by mouth every evening.  Dose: 1 mg     fluticasone 50 MCG/ACT nasal spray  Commonly known as: FLONASE   ADMINISTER 1 SPRAY INTO AFFECTED NOSTRIL(S) EVERY DAY.  Dose: 1 Spray     furosemide 20 MG Tabs  Commonly known as: LASIX   Take 1 Tablet by mouth 1 time a day as needed (leg swelling).  Dose: 20 mg     gabapentin 100 MG Caps  Commonly known as: NEURONTIN   Take 1 Capsule by mouth every evening.  Dose: 100 mg     * insulin glargine 100 UNIT/ML Soln  Commonly known as: Lantus   Inject 8 Units under the skin every day.  Dose: 8 Units     Lancets   Doctor's comments: Or per formulary preference. ICD-10 code: E11.9 Controlled type 2 Diabetes Mellitus with long term insulin use  Use one Freestyle Pearl lancet to test blood sugar once daily .     levothyroxine 50 MCG Tabs  Commonly known as: SYNTHROID   Take 1 Tablet by mouth every morning on an empty stomach.  Dose: 50 mcg     losartan 100 MG Tabs  Commonly known as:  COZAAR   Take 1 Tablet by mouth every evening.  Dose: 100 mg     mycophenolate 500 MG tablet  Commonly known as: CELLCEPT   Take 2 Tablets by mouth 2 times a day.  Dose: 1,000 mg     ONE TOUCH ULTRA 2 w/Device Kit   1 DEVICE 3 TIMES A DAY BEFORE MEALS.     OneTouch Verio strip  Generic drug: glucose blood   1 Strip by Other route as needed (on insulin checking 3-4 times a day).  Dose: 1 Each     predniSONE 5 MG Tabs  Commonly known as: DELTASONE   Take 2 Tablets by mouth every day.  Dose: 10 mg     sodium bicarbonate 650 MG Tabs  Commonly known as: SODIUM BICARBONATE   Take 2 Tablets by mouth in the morning, at noon, and at bedtime.  Dose: 1,300 mg     tacrolimus 1 MG Caps  Commonly known as: PROGRAF   Take 1 Capsule by mouth 2 times a day.  Dose: 1 mg           * This list has 1 medication(s) that are the same as other medications prescribed for you. Read the directions carefully, and ask your doctor or other care provider to review them with you.                STOP taking these medications      Aspirin Low Dose 81 MG EC tablet  Generic drug: aspirin     carvedilol 6.25 MG Tabs  Commonly known as: COREG     chlorthalidone 25 MG Tabs  Commonly known as: HYGROTON            ASK your doctor about these medications        Instructions   * Lantus SoloStar 100 UNIT/ML Sopn injection  Generic drug: insulin glargine   Inject 5 Units under the skin every day.  Dose: 5 Units     magnesium oxide 400 (240 Mg) MG Tabs   Take 1 Tablet by mouth every day.  Dose: 400 mg     NovoLOG FlexPen 100 UNIT/ML injection PEN  Generic drug: insulin aspart   Inject 2-8 Units under the skin 3 times a day before meals. Sliding Scale  Dose: 2-8 Units     omeprazole 20 MG delayed-release capsule  Commonly known as: PRILOSEC   Take 1 Capsule by mouth every day.  Dose: 20 mg           * This list has 1 medication(s) that are the same as other medications prescribed for you. Read the directions carefully, and ask your doctor or other care provider  to review them with you.                Disposition:   Discharge home    Activity:   As tolerated    Code status:   Full code    Primary Care Provider:    ANGELITA Concepcion    Follow up appointment details :      ANGELITA Concepcion  [address]  [address]  [place] [address]  [phone]    Schedule an appointment as soon as possible for a visit in 1 week(s)      Future Appointments   Date Time Provider Department Center   2/22/2023  2:00 PM NON PROVIDER-CAM B [facility] None   3/3/2023  1:30 PM ANGELITA Ortega [facility] None   3/3/2023  3:00 PM ANGELITA GuzmánR [facility]   3/8/2023  3:20 PM ANGELITA Concepcion [facility]   6/27/2023  4:00 PM ANGELITA Fisher [facility] None   8/7/2023  4:45 PM [facility] EXAM 4 [facility] None       Pending Studies:        None    Time spent on discharge day patient visit: 41 minutes    #################################################    Interval history/exam for day of discharge:    Vitals:    02/19/23 1915 02/20/23 0005 02/20/23 0430 02/20/23 0735   BP: 127/55 136/64 (!) 147/63 (!) 165/68   Pulse: (!) 54 (!) 55 (!) 58 (!) 54   Resp: 16 16 16 16   Temp: 36.4 °C (97.5 °F) 36.2 °C (97.2 °F) 36.7 °C (98.1 °F) 36.8 °C (98.2 °F)   TempSrc: Temporal Temporal Temporal Temporal   SpO2: 99% 92% 94% 94%   Weight:       Height:         Weight/BMI: Body mass index is 25.68 kg/m².  Pulse Oximetry: 94 %, O2 (LPM): 0, O2 Delivery Device: None - Room Air    General:  NAD  CVS:  RRR  PULM:  CTAB, no respiratory distress    Most Recent Labs:    Lab Results   Component Value Date/Time    WBC 1.8 (LL) 02/20/2023 02:48 AM    RBC 3.35 (L) 02/20/2023 02:48 AM    HEMOGLOBIN 10.1 (L) 02/20/2023 02:48 AM    HEMATOCRIT 31.8 (L) 02/20/2023 02:48 AM    MCV 94.9 02/20/2023 02:48 AM    MCH 30.1 02/20/2023 02:48 AM    MCHC 31.8 (L) 02/20/2023 02:48 AM    MPV 12.2 02/20/2023 02:48 AM    NEUTSPOLYS 59.00 02/18/2023 04:17 AM    LYMPHOCYTES 18.80 (L) 02/18/2023 04:17 AM     MONOCYTES 19.70 (H) 02/18/2023 04:17 AM    EOSINOPHILS 0.80 02/18/2023 04:17 AM    BASOPHILS 1.70 02/18/2023 04:17 AM    ANISOCYTOSIS 1+ 02/13/2023 07:22 AM      Lab Results   Component Value Date/Time    SODIUM 140 02/20/2023 02:48 AM    POTASSIUM 4.8 02/20/2023 02:48 AM    CHLORIDE 107 02/20/2023 02:48 AM    CO2 23 02/20/2023 02:48 AM    GLUCOSE 105 (H) 02/20/2023 02:48 AM    BUN 31 (H) 02/20/2023 02:48 AM    CREATININE 1.43 (H) 02/20/2023 02:48 AM    BUNCREATRAT 8 (L) 01/28/2021 07:00 AM      Lab Results   Component Value Date/Time    ALTSGPT 16 02/17/2023 10:00 PM    ASTSGOT 22 02/17/2023 10:00 PM    ALKPHOSPHAT 111 (H) 02/17/2023 10:00 PM    TBILIRUBIN 0.3 02/17/2023 10:00 PM    LIPASE 40 11/08/2022 06:30 AM    ALBUMIN 3.7 02/20/2023 02:48 AM    GLOBULIN 2.9 02/17/2023 10:00 PM    INR 1.46 (H) 02/18/2023 04:17 AM     Lab Results   Component Value Date/Time    PROTHROMBTM 17.4 (H) 02/18/2023 04:17 AM    INR 1.46 (H) 02/18/2023 04:17 AM        Imaging/ Testing:      EC-VELMA W/O CONT   Final Result      EC-ECHOCARDIOGRAM COMPLETE W/O CONT   Final Result          Instructions:      The patient was instructed to return to the ER in the event of worsening symptoms. I have counseled the patient on the importance of compliance and the patient has agreed to proceed with all medical recommendations and follow up plan indicated above.   The patient understands that all medications come with benefits and risks. Risks may include permanent injury or death and these risks can be minimized with close reassessment and monitoring.

## 2023-02-22 ENCOUNTER — TELEPHONE (OUTPATIENT)
Dept: CARDIOLOGY | Facility: MEDICAL CENTER | Age: 73
End: 2023-02-22

## 2023-02-22 ENCOUNTER — NON-PROVIDER VISIT (OUTPATIENT)
Dept: CARDIOLOGY | Facility: MEDICAL CENTER | Age: 73
End: 2023-02-22
Payer: MEDICARE

## 2023-02-22 VITALS — SYSTOLIC BLOOD PRESSURE: 128 MMHG | DIASTOLIC BLOOD PRESSURE: 55 MMHG

## 2023-02-22 NOTE — PROGRESS NOTES
SC please advise on BP check in office. She has follow up appointment scheduled March 3rd with JOSE

## 2023-02-22 NOTE — PROGRESS NOTES
Please advise- Patient is to see you on March 3rd for follow up  Patient came in for a follow up blood pressure check. BP in flow sheet.   Patient stated she has been taking all of her medications but previously was in hospital. They made some medication changes and she has not been feeling well since. Patient has very poor medication regimen. Last visit I sent her home a full print out of every medication she needs to take. Very non compliant, see previous encounters.

## 2023-02-22 NOTE — PROGRESS NOTES
Patient was here today for blood pressure check per SC. BP readings located in vital sign section including patient's home BP cuff readings. Informed patient we will forward readings to nurse and they will receive a call with recommendations.    Pt states she is feeling very weak, down, and is not feeling hungry. She did eat oatmeal and bread today.     She also states that she has been in the hospital and had some medication changes. Added Amiodarone 200MG tabs 2 tabs twice daily for 7 days then 2 tabs once daily for 7 days then 1 tab daily for 300 days. Metoprolol 25MG nightly. Losartan Potassium 100MG to continue taking and Eliquis 2.5MG when needed for swelling in her feet.

## 2023-02-23 NOTE — PROGRESS NOTES
Phone Number Called: [phone] and      Call outcome: Left detailed message for patient. Informed to call back with any additional questions.    Message: Called to inform patient of SC recommendations of coming into office on Friday for an EKG. Asked for a call back.

## 2023-02-23 NOTE — PROGRESS NOTES
Per SC bring patient in for EKG due to the amount of Amiodarone dose she is on. Consider home health referral for further medication management.

## 2023-02-24 ENCOUNTER — PATIENT OUTREACH (OUTPATIENT)
Dept: HEALTH INFORMATION MANAGEMENT | Facility: OTHER | Age: 73
End: 2023-02-24
Payer: MEDICARE

## 2023-02-24 ENCOUNTER — TELEPHONE (OUTPATIENT)
Dept: CARDIOLOGY | Facility: MEDICAL CENTER | Age: 73
End: 2023-02-24
Payer: MEDICARE

## 2023-02-24 LAB — TACROLIMUS BLD-MCNC: 3.7 NG/ML

## 2023-02-24 NOTE — TELEPHONE ENCOUNTER
Phone Number Called: [phone]    Call outcome:  Spoke to Patients Son    Message: Called to inform patient of SC recommendations. Per SC bring patient in for EKG due to the amount of Amiodarone dose she is on. Consider home health referral for further medication management.   Patient Son wanted to think more about home health referral. Did not quite understand why patient would need this.     Patient coming in Monday the 27th for EKG and BP check. Follow up with  March 3rd.     SC-FYI, bringing patient in Monday.

## 2023-02-24 NOTE — TELEPHONE ENCOUNTER
----- Message from ANGELITA Keen sent at 2/22/2023  3:26 PM PST -----  Come in for EKG and BP non-provider tomorrow to assess symptoms more.     Consider sooner apt with [facility] as well.     She is Croatian speaking and is very non-compliant in the past so hard to get information over the phone. SC  ----- Message -----  From: Maya Hewitt R.N.  Sent: 2/22/2023   3:20 PM PST  To: ANGELITA Keen          ----- Message -----  From: ANGELITA Ortega  Sent: 2/22/2023   3:13 PM PST  To: Maya Hewitt R.N.    I think that this should go to [facility] as she just saw her on 2/9/23, and I have never met her. Thanks,   ----- Message -----  From: Maya Hewitt R.N.  Sent: 2/22/2023   2:25 PM PST  To: ANGELITA Ortega          ----- Message -----  From: Purnima Bundy, Med Ass't  Sent: 2/22/2023   2:02 PM PST  To: Maya Hewitt R.N.

## 2023-02-24 NOTE — PROGRESS NOTES
13:56: TONA Brock received referral from TCM nurse suggesting Personal Care Management program. CHW contacted pt. CHW spoke with pt's son Manoj. Manoj states that he believes his mom would be interested in the program. Manoj provided pts number [phone] and told CHW to call pt in a couple hours to begin enrollment if pt is interested.    1548: TONA Brock attempted to contact pt using phone number provided by pt's son. CHW was unable to leave message due to voicemail recording stating a different name. CHW will contact pt next week to reintroduce program.

## 2023-02-27 ENCOUNTER — TELEPHONE (OUTPATIENT)
Dept: CARDIOLOGY | Facility: MEDICAL CENTER | Age: 73
End: 2023-02-27

## 2023-02-27 DIAGNOSIS — I48.0 PAROXYSMAL A-FIB (HCC): ICD-10-CM

## 2023-02-27 NOTE — TELEPHONE ENCOUNTER
Phone Number Called: [phone]    Call outcome:  Spoke with patients son    Message: Called to inform patient and patients son to decrease Amiodarone dose to 200mg daily and placed another heart monitor.   Patient to follow up with .    ---------------------------------------------------------------------------  ------ Message from ANGELITA Keen sent at 2/27/2023  2:05 PM PST -----  Reduce amiodarone to 200 mg QAM and keep metoprolol XL 25 mg QPM. Let's order a 7 day zio live on her too please. SC

## 2023-02-28 ENCOUNTER — HOSPITAL ENCOUNTER (OUTPATIENT)
Dept: LAB | Facility: MEDICAL CENTER | Age: 73
End: 2023-02-28
Attending: INTERNAL MEDICINE
Payer: MEDICARE

## 2023-02-28 LAB
ALBUMIN SERPL BCP-MCNC: 4 G/DL (ref 3.2–4.9)
ALBUMIN/GLOB SERPL: 1.9 G/DL
ALP SERPL-CCNC: 81 U/L (ref 30–99)
ALT SERPL-CCNC: 17 U/L (ref 2–50)
ANION GAP SERPL CALC-SCNC: 10 MMOL/L (ref 7–16)
ANISOCYTOSIS BLD QL SMEAR: ABNORMAL
APPEARANCE UR: CLEAR
AST SERPL-CCNC: 20 U/L (ref 12–45)
BACTERIA #/AREA URNS HPF: ABNORMAL /HPF
BASOPHILS # BLD AUTO: 1 % (ref 0–1.8)
BASOPHILS # BLD: 0.03 K/UL (ref 0–0.12)
BILIRUB SERPL-MCNC: 0.4 MG/DL (ref 0.1–1.5)
BILIRUB UR QL STRIP.AUTO: NEGATIVE
BUN SERPL-MCNC: 26 MG/DL (ref 8–22)
CALCIUM ALBUM COR SERPL-MCNC: 9.4 MG/DL (ref 8.5–10.5)
CALCIUM SERPL-MCNC: 9.4 MG/DL (ref 8.4–10.2)
CHLORIDE SERPL-SCNC: 107 MMOL/L (ref 96–112)
CO2 SERPL-SCNC: 24 MMOL/L (ref 20–33)
COLOR UR: YELLOW
CREAT SERPL-MCNC: 1.41 MG/DL (ref 0.5–1.4)
EOSINOPHIL # BLD AUTO: 0 K/UL (ref 0–0.51)
EOSINOPHIL NFR BLD: 0 % (ref 0–6.9)
EPI CELLS #/AREA URNS HPF: ABNORMAL /HPF
ERYTHROCYTE [DISTWIDTH] IN BLOOD BY AUTOMATED COUNT: 44.2 FL (ref 35.9–50)
GFR SERPLBLD CREATININE-BSD FMLA CKD-EPI: 39 ML/MIN/1.73 M 2
GLOBULIN SER CALC-MCNC: 2.1 G/DL (ref 1.9–3.5)
GLUCOSE SERPL-MCNC: 96 MG/DL (ref 65–99)
GLUCOSE UR STRIP.AUTO-MCNC: NEGATIVE MG/DL
HCT VFR BLD AUTO: 32.7 % (ref 37–47)
HGB BLD-MCNC: 10.2 G/DL (ref 12–16)
KETONES UR STRIP.AUTO-MCNC: NEGATIVE MG/DL
LEUKOCYTE ESTERASE UR QL STRIP.AUTO: ABNORMAL
LG PLATELETS BLD QL SMEAR: NORMAL
LYMPHOCYTES # BLD AUTO: 0.3 K/UL (ref 1–4.8)
LYMPHOCYTES NFR BLD: 9 % (ref 22–41)
MACROCYTES BLD QL SMEAR: ABNORMAL
MANUAL DIFF BLD: NORMAL
MCH RBC QN AUTO: 29.6 PG (ref 27–33)
MCHC RBC AUTO-ENTMCNC: 31.2 G/DL (ref 33.6–35)
MCV RBC AUTO: 94.8 FL (ref 81.4–97.8)
MICRO URNS: ABNORMAL
MONOCYTES # BLD AUTO: 0.36 K/UL (ref 0–0.85)
MONOCYTES NFR BLD AUTO: 11 % (ref 0–13.4)
NEUTROPHILS # BLD AUTO: 2.61 K/UL (ref 2–7.15)
NEUTROPHILS NFR BLD: 78 % (ref 44–72)
NEUTS BAND NFR BLD MANUAL: 1 % (ref 0–10)
NITRITE UR QL STRIP.AUTO: NEGATIVE
NRBC # BLD AUTO: 0 K/UL
NRBC BLD-RTO: 0 /100 WBC
OVALOCYTES BLD QL SMEAR: NORMAL
PH UR STRIP.AUTO: 6 [PH] (ref 5–8)
PLATELET # BLD AUTO: 127 K/UL (ref 164–446)
PLATELET BLD QL SMEAR: NORMAL
PMV BLD AUTO: 11.3 FL (ref 9–12.9)
POTASSIUM SERPL-SCNC: 3.8 MMOL/L (ref 3.6–5.5)
PROT SERPL-MCNC: 6.1 G/DL (ref 6–8.2)
PROT UR QL STRIP: NEGATIVE MG/DL
RBC # BLD AUTO: 3.45 M/UL (ref 4.2–5.4)
RBC # URNS HPF: ABNORMAL /HPF
RBC BLD AUTO: PRESENT
RBC UR QL AUTO: NEGATIVE
SODIUM SERPL-SCNC: 141 MMOL/L (ref 135–145)
SP GR UR STRIP.AUTO: 1.01
VARIANT LYMPHS BLD QL SMEAR: NORMAL
WBC # BLD AUTO: 3.3 K/UL (ref 4.8–10.8)
WBC #/AREA URNS HPF: ABNORMAL /HPF

## 2023-02-28 PROCEDURE — 80053 COMPREHEN METABOLIC PANEL: CPT

## 2023-02-28 PROCEDURE — 85007 BL SMEAR W/DIFF WBC COUNT: CPT

## 2023-02-28 PROCEDURE — 87799 DETECT AGENT NOS DNA QUANT: CPT

## 2023-02-28 PROCEDURE — 85025 COMPLETE CBC W/AUTO DIFF WBC: CPT

## 2023-02-28 PROCEDURE — 80197 ASSAY OF TACROLIMUS: CPT

## 2023-02-28 PROCEDURE — 81001 URINALYSIS AUTO W/SCOPE: CPT

## 2023-02-28 PROCEDURE — 36415 COLL VENOUS BLD VENIPUNCTURE: CPT

## 2023-03-02 LAB — TACROLIMUS BLD-MCNC: 3.6 NG/ML

## 2023-03-03 ENCOUNTER — OFFICE VISIT (OUTPATIENT)
Dept: ENDOCRINOLOGY | Facility: MEDICAL CENTER | Age: 73
End: 2023-03-03
Payer: MEDICARE

## 2023-03-03 VITALS
DIASTOLIC BLOOD PRESSURE: 42 MMHG | HEART RATE: 60 BPM | BODY MASS INDEX: 24.55 KG/M2 | SYSTOLIC BLOOD PRESSURE: 122 MMHG | OXYGEN SATURATION: 95 % | HEIGHT: 61 IN | WEIGHT: 130 LBS

## 2023-03-03 DIAGNOSIS — Z94.0 KIDNEY TRANSPLANT RECIPIENT: ICD-10-CM

## 2023-03-03 DIAGNOSIS — E11.9 CONTROLLED TYPE 2 DIABETES MELLITUS WITHOUT COMPLICATION, WITH LONG-TERM CURRENT USE OF INSULIN (HCC): ICD-10-CM

## 2023-03-03 DIAGNOSIS — I10 ESSENTIAL HYPERTENSION: ICD-10-CM

## 2023-03-03 DIAGNOSIS — I25.10 CARDIOVASCULAR DISEASE: ICD-10-CM

## 2023-03-03 DIAGNOSIS — E03.9 HYPOTHYROIDISM, ACQUIRED: ICD-10-CM

## 2023-03-03 DIAGNOSIS — E78.5 DYSLIPIDEMIA: ICD-10-CM

## 2023-03-03 DIAGNOSIS — E55.9 VITAMIN D DEFICIENCY: ICD-10-CM

## 2023-03-03 DIAGNOSIS — E11.43 PERIPHERAL AUTONOMIC NEUROPATHY DUE TO DIABETES MELLITUS (HCC): ICD-10-CM

## 2023-03-03 DIAGNOSIS — Z79.4 CONTROLLED TYPE 2 DIABETES MELLITUS WITHOUT COMPLICATION, WITH LONG-TERM CURRENT USE OF INSULIN (HCC): ICD-10-CM

## 2023-03-03 LAB
BK PLASMA INTERP, QNT NAAT NL11711: DETECTED
BK PLASMA IU/ML, QNT NAAT NL11709: ABNORMAL IU/ML
BK PLASMA LOG IU/ML, QNT NAAT NL11710: 5.88 LOG IU/ML
BK UR INTERP, QNT NAAT NL11708: DETECTED
BK UR IU/ML, QNT NAAT NL11706: ABNORMAL IU/ML
BK UR LOG IU/ML, QNT NAAT NL11707: >8 LOG IU/ML

## 2023-03-03 PROCEDURE — 99215 OFFICE O/P EST HI 40 MIN: CPT

## 2023-03-03 PROCEDURE — 99212 OFFICE O/P EST SF 10 MIN: CPT

## 2023-03-03 RX ORDER — LEVOTHYROXINE SODIUM 0.07 MG/1
75 TABLET ORAL
Qty: 90 TABLET | Refills: 4 | Status: SHIPPED | OUTPATIENT
Start: 2023-03-03 | End: 2024-03-07

## 2023-03-03 ASSESSMENT — FIBROSIS 4 INDEX: FIB4 SCORE: 2.79

## 2023-03-03 NOTE — PROGRESS NOTES
CHW Delmy attempted to call pt and offer Personal Care Management services. CHW was unable to reach pt. CHW will not continue to follow at this time.

## 2023-03-03 NOTE — PROGRESS NOTES
"Chief Complaint:  Consult requested by ANGELITA Concepcion for following up on the following conditions  HPI:   Tere Gallegos is a 70 y.o. female    1. Controlled type 2 diabetes mellitus with diabetic nephropathy, without long term current use of insulin:   Type 2 Diabetes Mellitus diagnosed in 20 years ago.      She checks her blood sugars 3x/day  Fasting blood hvoiad-      POC a1c on 02/18/2023 6.0%  POC a1c on 1/03/2023 at 5.8%  Last A1C on 11/8/21 at 5.7%, she was a dialysis patient which makes A1c unreliable.    Diabetes management:  Lantus 5 units  Novolg 2-4 units if BG >200    She denies hypoglycemic episodes.    She  denies hypoglycemic unawareness.   She denies episodes of severe hypoglycemia requiring third party assistance.      Diet: \"healthy\" diet  in general  Exercise: minimal     Diabetes Complications   She  reports history of mild proliferative diabetic retinopathy.    She denies laser eye surgery.   Cataract surgery both eyes were done this year   Last eye exam: Unknown, but next appointment is on December 14th 2021.  Opthlmology appt is on January 10th     Latest Reference Range & Units 01/23/23 08:48   Fructosamine 205 - 285 umol/L 259      Latest Reference Range & Units 11/12/19 12:47   C-Peptide 0.8 - 3.5 ng/mL 15.2 (H)      Latest Reference Range & Units 11/12/19 12:47   MAN Antibody 0.0 - 5.0 IU/mL <5.0     2.  Peripheral neuropathy due to diabetes:  She reports history of peripheral sensory neuropathy.    She reports numbness, to both arms bilaterally when she is asleep he wakes her up from sleep-This  resolves within a few minutes with arm movement.   Denies numbness or tingling to her feet but reports pain occasionally she is currently taking Lyrica.  she denies history of foot sores.     3.  Kidney transplant status:  Surgery in [facility] in 10/31/22   Currently taking Losartan    Latest Reference Range & Units 01/23/23 08:49   Micro Alb Creat Ratio 0 - 30 " mg/g see below   Creatinine, Urine mg/dL 69.97   Microalbumin, Urine Random mg/dL <1.2     4.  Cardiovascular disease:  Paroxysmal Atrial Fibrillation-She is taking Eliquis.   She is seeing at [facility] Santamaria     5.  Hypothyroidism, acquired:   She is currently levothyroixine 50 mcg of levothyroxine daily   She takes it every morning on an empty stomach  Denies taking any iron, calcium, multivitamins, antiacids with the medication    Denies fatigue, constipation, dry skin, palpitations.      Latest Reference Range & Units 02/18/23 04:17   TSH 0.380 - 5.330 uIU/mL 9.010 (H)   Free T-4 0.93 - 1.70 ng/dL 1.25     6. Essential Hypertension:  FV by cardiology   Currently taking Norvasc 10 mg, carvedilol 25 mg, lisinopril 40 mg  BP in office today was 122/42  Denies any dizziness, palpitations, chest pain    7.  Dyslipidemia:  She is currently taking statin-atorvastatin 20 mg daily on 5  She still complaining of muscle aches especially in her lower legs   Latest Reference Range & Units 02/18/23 04:17   Cholesterol,Tot 100 - 199 mg/dL 97 (L)   Triglycerides 0 - 149 mg/dL 144   HDL >=40 mg/dL 31 !   LDL <100 mg/dL 37       8. Vitamin D deficiency:  Currently not taking any vitamin D   Latest Reference Range & Units 01/23/23 08:48   25-Hydroxy   Vitamin D 25 30 - 100 ng/mL 16 (L)      Latest Reference Range & Units 11/19/20 11:44   25-Hydroxy   Vitamin D 25 30 - 100 ng/mL 37     ROS:     CONS:     No fever, no chills, no weight loss, no fatigue   EYES:      No diplopia, no blurry vision, no redness of eyes, no swelling of eyelids   ENT:    No hearing loss, No ear pain, No sore throat, no dysphagia, no neck swelling   CV:     No chest pain, no palpitations, no claudication, no orthopnea, no PND   PULM:    No SOB, no cough, no hemoptysis, no wheezing    GI:   No nausea, no vomiting, no diarrhea, no constipation, no bloody stools   :  Passing urine well, no dysuria, no  hematuria   ENDO:   No polyuria, no polydipsia, no heat intolerance, no cold intolerance   NEURO: No headaches, no dizziness, no convulsions, no tremors   MUSC:  No joint swellings, no arthralgias, no myalgias, no weakness   SKIN:   No rash, no ulcers, no dry skin   PSYCH:   No depression, no anxiety, no difficulty sleeping       Past Medical History:  Patient Active Problem List    Diagnosis Date Noted    Atrial fibrillation with RVR ([facility]) 2023    Chronic anticoagulation 2022    Long-term insulin use ([facility]) 2022    Kidney transplant recipient 2022    Lung nodules 10/22/2022    GERD (gastroesophageal reflux disease) 10/19/2022    Normocytic anemia 2022    Acute diastolic CHF (congestive heart failure) ([facility]) 2022    Paroxysmal A-fib ([facility]) 2021    Chest pain 2021    Presence of drug-eluting stent in right coronary artery     Chronic heart failure with preserved ejection fraction ([facility]) 2020    ESRD s/p kidney transplant 10/31/22 2020    Acquired hypothyroidism 2020    Dental disorder 2020    Coronary artery disease with angina pectoris with documented spasm ([facility])     Mixed hyperlipidemia 2019    RLS (restless legs syndrome) 2016    Controlled type 2 diabetes mellitus with chronic kidney disease on chronic dialysis, with long-term current use of insulin ([facility]) 2016    Renovascular hypertension 2013       Past Surgical History:  Past Surgical History:   Procedure Laterality Date    OTHER ABDOMINAL SURGERY Right 10/31/2022     Donor kidney transplant - [facility] CARDIAC CATH  2016    RCA stented with 2 Synergy drug-eluting stents.    RECOVERY  2016    Procedure: CATH LAB [facility] WITH POSSIBLE DR. CASTILLO;  Surgeon: Recoveryon Surgery;  Location: [place] PRE[place] UN[place] [facility];  Service:     [facility] CARDIAC CATH  2016    100% RCA    OTHER Left     left arm upper extremity fistula     OTHER ABDOMINAL SURGERY      left kidney removed due to cancer        Allergies:  Patient has no known allergies.     Current Medications:    Current Outpatient Medications:     metoprolol SR (TOPROL XL) 25 MG TABLET SR 24 HR, Take 1 Tablet by mouth every evening., Disp: 30 Tablet, Rfl: 2    amiodarone (CORDARONE) 200 MG Tab, Take 2 Tablets by mouth 2 times a day for 7 days, THEN 2 Tablets every day for 7 days, THEN 1 Tablet every day for 300 days., Disp: 58 Tablet, Rfl: 2    furosemide (LASIX) 20 MG Tab, Take 1 Tablet by mouth 1 time a day as needed (leg swelling)., Disp: 30 Tablet, Rfl: 11    BD PEN NEEDLE PETE 2ND GEN, USE AS DIRECTED WITH INSULIN PENS THREE TIMES DAILY BEFORE A MEAL, Disp: , Rfl:     doxazosin (CARDURA) 1 MG Tab, Take 1 Tablet by mouth every evening., Disp: 30 Tablet, Rfl: 11    apixaban (ELIQUIS) 5 MG Tab tablet, Take 1 Tablet by mouth 2 times a day., Disp: 180 Tablet, Rfl: 1    gabapentin (NEURONTIN) 100 MG Cap, Take 1 Capsule by mouth every evening., Disp: 90 Capsule, Rfl: 3    insulin glargine (LANTUS SOLOSTAR) 100 UNIT/ML Solution Pen-injector injection, Inject 5 Units under the skin every day. (Patient not taking: Reported on 2/15/2023), Disp: , Rfl:     amLODIPine (NORVASC) 10 MG Tab, Take 10 mg by mouth every day., Disp: , Rfl:     losartan (COZAAR) 100 MG Tab, Take 1 Tablet by mouth every evening., Disp: 90 Tablet, Rfl: 3    levothyroxine (SYNTHROID) 50 MCG Tab, Take 1 Tablet by mouth every morning on an empty stomach., Disp: 90 Tablet, Rfl: 4    glucose blood (ONETOUCH VERIO) strip, 1 Strip by Other route as needed (on insulin checking 3-4 times a day)., Disp: 150 Strip, Rfl: 6    Blood Glucose Monitoring Suppl (ONE TOUCH ULTRA 2) w/Device Kit, 1 DEVICE 3 TIMES A DAY BEFORE MEALS., Disp: 1 Kit, Rfl: 0    atorvastatin (LIPITOR) 20 MG Tab, Take 1 Tablet by mouth every evening., Disp: 100 Tablet, Rfl: 3    magnesium oxide 400 (240 Mg) MG Tab, Take 1 Tablet by mouth every day. (Patient  not taking: Reported on 2/15/2023), Disp: 10 Tablet, Rfl: 0    omeprazole (PRILOSEC) 20 MG delayed-release capsule, Take 1 Capsule by mouth every day. (Patient not taking: Reported on 2/15/2023), Disp: 30 Capsule, Rfl: 0    docusate sodium (COLACE) 100 MG Cap, Take 100-200 mg by mouth 2 times a day as needed for Constipation. Indications: Constipation, Disp: , Rfl:     Lancets, Use one Freestyle Pearl lancet to test blood sugar once daily ., Disp: 300 Each, Rfl: 3    sodium bicarbonate (SODIUM BICARBONATE) 650 MG Tab, Take 2 Tablets by mouth in the morning, at noon, and at bedtime., Disp: 120 Tablet, Rfl: 3    fluticasone (FLONASE) 50 MCG/ACT nasal spray, ADMINISTER 1 SPRAY INTO AFFECTED NOSTRIL(S) EVERY DAY., Disp: 16 mL, Rfl: 1    insulin glargine 100 UNIT/ML SC SOLN, Inject 8 Units under the skin every day., Disp: , Rfl:     insulin aspart (NOVOLOG FLEXPEN) 100 UNIT/ML injection PEN, Inject 2-8 Units under the skin 3 times a day before meals. Sliding Scale (Patient not taking: Reported on 2/15/2023), Disp: , Rfl:     predniSONE (DELTASONE) 5 MG Tab, Take 2 Tablets by mouth every day., Disp: , Rfl:     mycophenolate (CELLCEPT) 500 MG tablet, Take 2 Tablets by mouth 2 times a day., Disp: , Rfl:     tacrolimus (PROGRAF) 1 MG Cap, Take 1 Capsule by mouth 2 times a day., Disp: , Rfl:     Social History:  Social History     Socioeconomic History    Marital status:      Spouse name: Not on file    Number of children: Not on file    Years of education: Not on file    Highest education level: Not on file   Occupational History    Not on file   Tobacco Use    Smoking status: Never    Smokeless tobacco: Never   Vaping Use    Vaping Use: Never used   Substance and Sexual Activity    Alcohol use: No     Alcohol/week: 0.0 oz    Drug use: No    Sexual activity: Never   Other Topics Concern    Not on file   Social History Narrative    Not on file     Social Determinants of Health     Financial Resource Strain: Not on file  "  Food Insecurity: Not on file   Transportation Needs: Not on file   Physical Activity: Not on file   Stress: Not on file   Social Connections: Not on file   Intimate Partner Violence: Not on file   Housing Stability: Not on file        Family History:   Family History   Problem Relation Age of Onset    Diabetes Sister     Other Sister         liver disease    Diabetes Brother     Heart Disease Neg Hx        PHYSICAL EXAM:   Vital signs: /42   Pulse 60   Ht 1.549 m (5' 1\")   Wt 59 kg (130 lb)   LMP  (LMP Unknown)   SpO2 95%   BMI 24.56 kg/m²   GENERAL: Well-developed, well-nourished  in no apparent distress.   EYE: No ocular and eyelid asymmetry, Anicteric sclerae,  PERRL, No exophthalmos or lidlag  HENT: Hearing grossly intact, Normocephalic, atraumatic.   NECK: Supple. Trachea midline. thyroid is normal in size without nodules or tenderness  CARDIOVASCULAR: Regular rate and rhythm. No murmurs, rubs, or gallops.   LUNGS: Clear to auscultation bilaterally   EXTREMITIES: No clubbing, cyanosis, or edema.   NEUROLOGICAL: Cranial nerves II-XII are grossly intact   Symmetric reflexes at the patella no proximal muscle weakness, No visible tremor with both outstretched hands  LYMPH: No cervical, supraclavicular,  adenopathy palpated.   SKIN: No rashes, lesions. Turgor is normal.  FOOT: Normal sensation to monofilament testing, normal pulses, no ulcers.  Normal Vibration quantitative sensation test.    Labs:  Lab Results   Component Value Date/Time    HBA1C 5.7 (A) 12/08/2021 1520    AVGLUC 111 04/28/2021 0937     Lab Results   Component Value Date/Time    CHOLSTRLTOT 125 09/29/2020 1056    TRIGLYCERIDE 113 09/29/2020 1056    HDL 48 09/29/2020 1056    LDL 54 09/29/2020 1056       Lab Results   Component Value Date/Time    MALBCRT see below 01/23/2023 08:49 AM    MICROALBUR <1.2 01/23/2023 08:49 AM        Lab Results   Component Value Date/Time    TSHULTRASEN 4.150 08/12/2021 0201     Lab Results   Component " Value Date/Time    JASPAL [id number] 01/28/2021 0700         ASSESSMENT/PLAN:   1. Controlled type 2 diabetes mellitus without complication, with long-term current use of insulin (HCC)  Controlled with an A1c of 6%  Lifestyle modifications discussed    Diabetes management:  Lantus 5 units-increased to 7 units  Novolg 2-4 units if BG >200 2 units before each meal plus a sliding scale  151-200  1 unit  200-250 2 units  2  3 units  301-350 4 units  351-400 5 units  401-450 6 units      2. Peripheral autonomic neuropathy due to diabetes mellitus (HCC)  Stable    3. Kidney transplant recipient  Stable  Follow by nephrologist    4. Cardiovascular disease  Stable  Follow-up with cardiologist    5. Hypothyroidism, acquired  Clinically stable  Biochemically unstable  Levothyroxine increased to 75 mcg daily  - levothyroxine (SYNTHROID) 75 MCG Tab; Take 1 Tablet by mouth every morning on an empty stomach.  Dispense: 90 Tablet; Refill: 4    6. Essential hypertension  Stable  Follow-up PCP    7. Dyslipidemia  Unstable  Continue regimen-HPI    8. Vitamin D deficiency  Unstable  Following nephrologist    Disposition: Follow-up in 3 months  Do blood work 1 week prior to next appointment with me    Thank you kindly for allowing me to participate in the diabetes care plan for this patient.    ANGELITA Guzmán  03/03/2023    CC:   ANGELITA Concepcion

## 2023-03-06 ENCOUNTER — TELEPHONE (OUTPATIENT)
Dept: HEALTH INFORMATION MANAGEMENT | Facility: OTHER | Age: 73
End: 2023-03-06
Payer: MEDICARE

## 2023-03-06 ENCOUNTER — TELEPHONE (OUTPATIENT)
Dept: CARDIOLOGY | Facility: MEDICAL CENTER | Age: 73
End: 2023-03-06
Payer: MEDICARE

## 2023-03-06 DIAGNOSIS — D68.69 SECONDARY HYPERCOAGULABLE STATE (HCC): ICD-10-CM

## 2023-03-06 DIAGNOSIS — I48.0 PAROXYSMAL A-FIB (HCC): ICD-10-CM

## 2023-03-06 NOTE — TELEPHONE ENCOUNTER
SC    Caller: Manoj Swann (son)    Topic/issue: MEDICATION MANAGEMENT    Manoj would like to know if patient should continue taking the ELIQUIS and the CARVEDILOL. Patient has been out of this medication for a couple weeks and they would like to know if it appropriate to ask for a refill. Please advise.    Thank you,  Navjot MARCANO    Callback Number: [phone] (home) [phone] (work)

## 2023-03-06 NOTE — TELEPHONE ENCOUNTER
Phone Number Called: [phone]    Call outcome:  Spoke with patients Son    Message: Called to inform patient a new RX of Eliquis was sent to preferred pharmacy. Let patient son know that Carvedilol was no longer on patients medication list.

## 2023-03-07 ENCOUNTER — HOSPITAL ENCOUNTER (OUTPATIENT)
Dept: LAB | Facility: MEDICAL CENTER | Age: 73
End: 2023-03-07
Attending: INTERNAL MEDICINE
Payer: MEDICARE

## 2023-03-07 LAB
ANION GAP SERPL CALC-SCNC: 9 MMOL/L (ref 7–16)
BASOPHILS # BLD AUTO: 0.7 % (ref 0–1.8)
BASOPHILS # BLD: 0.02 K/UL (ref 0–0.12)
BUN SERPL-MCNC: 25 MG/DL (ref 8–22)
CALCIUM SERPL-MCNC: 9.5 MG/DL (ref 8.4–10.2)
CHLORIDE SERPL-SCNC: 108 MMOL/L (ref 96–112)
CO2 SERPL-SCNC: 24 MMOL/L (ref 20–33)
CREAT SERPL-MCNC: 1.61 MG/DL (ref 0.5–1.4)
EOSINOPHIL # BLD AUTO: 0.03 K/UL (ref 0–0.51)
EOSINOPHIL NFR BLD: 1 % (ref 0–6.9)
ERYTHROCYTE [DISTWIDTH] IN BLOOD BY AUTOMATED COUNT: 44.6 FL (ref 35.9–50)
GFR SERPLBLD CREATININE-BSD FMLA CKD-EPI: 34 ML/MIN/1.73 M 2
GLUCOSE SERPL-MCNC: 87 MG/DL (ref 65–99)
HCT VFR BLD AUTO: 32.9 % (ref 37–47)
HGB BLD-MCNC: 10.2 G/DL (ref 12–16)
IMM GRANULOCYTES # BLD AUTO: 0.12 K/UL (ref 0–0.11)
IMM GRANULOCYTES NFR BLD AUTO: 4.1 % (ref 0–0.9)
LYMPHOCYTES # BLD AUTO: 0.29 K/UL (ref 1–4.8)
LYMPHOCYTES NFR BLD: 9.8 % (ref 22–41)
MCH RBC QN AUTO: 29.4 PG (ref 27–33)
MCHC RBC AUTO-ENTMCNC: 31 G/DL (ref 33.6–35)
MCV RBC AUTO: 94.8 FL (ref 81.4–97.8)
MONOCYTES # BLD AUTO: 0.37 K/UL (ref 0–0.85)
MONOCYTES NFR BLD AUTO: 12.5 % (ref 0–13.4)
NEUTROPHILS # BLD AUTO: 2.13 K/UL (ref 2–7.15)
NEUTROPHILS NFR BLD: 71.9 % (ref 44–72)
NRBC # BLD AUTO: 0 K/UL
NRBC BLD-RTO: 0 /100 WBC
PHOSPHATE SERPL-MCNC: 2.6 MG/DL (ref 2.5–4.5)
PLATELET # BLD AUTO: 91 K/UL (ref 164–446)
PMV BLD AUTO: 10.5 FL (ref 9–12.9)
POTASSIUM SERPL-SCNC: 3.8 MMOL/L (ref 3.6–5.5)
RBC # BLD AUTO: 3.47 M/UL (ref 4.2–5.4)
SODIUM SERPL-SCNC: 141 MMOL/L (ref 135–145)
WBC # BLD AUTO: 3 K/UL (ref 4.8–10.8)

## 2023-03-07 PROCEDURE — 80197 ASSAY OF TACROLIMUS: CPT

## 2023-03-07 PROCEDURE — 84100 ASSAY OF PHOSPHORUS: CPT

## 2023-03-07 PROCEDURE — 85025 COMPLETE CBC W/AUTO DIFF WBC: CPT

## 2023-03-07 PROCEDURE — 36415 COLL VENOUS BLD VENIPUNCTURE: CPT

## 2023-03-07 PROCEDURE — 80048 BASIC METABOLIC PNL TOTAL CA: CPT

## 2023-03-08 ENCOUNTER — OFFICE VISIT (OUTPATIENT)
Dept: MEDICAL GROUP | Facility: MEDICAL CENTER | Age: 73
End: 2023-03-08
Payer: MEDICARE

## 2023-03-08 ENCOUNTER — NON-PROVIDER VISIT (OUTPATIENT)
Dept: CARDIOLOGY | Facility: MEDICAL CENTER | Age: 73
End: 2023-03-08
Attending: NURSE PRACTITIONER
Payer: MEDICARE

## 2023-03-08 VITALS
BODY MASS INDEX: 25.11 KG/M2 | OXYGEN SATURATION: 93 % | DIASTOLIC BLOOD PRESSURE: 50 MMHG | WEIGHT: 133 LBS | TEMPERATURE: 97.5 F | HEART RATE: 63 BPM | SYSTOLIC BLOOD PRESSURE: 140 MMHG | HEIGHT: 61 IN

## 2023-03-08 DIAGNOSIS — K59.03 DRUG-INDUCED CONSTIPATION: ICD-10-CM

## 2023-03-08 DIAGNOSIS — I47.10 SVT (SUPRAVENTRICULAR TACHYCARDIA) (HCC): ICD-10-CM

## 2023-03-08 DIAGNOSIS — D68.69 SECONDARY HYPERCOAGULABLE STATE (HCC): ICD-10-CM

## 2023-03-08 DIAGNOSIS — D64.9 NORMOCYTIC ANEMIA: ICD-10-CM

## 2023-03-08 DIAGNOSIS — I48.0 PAROXYSMAL A-FIB (HCC): ICD-10-CM

## 2023-03-08 DIAGNOSIS — R00.1 SINUS BRADYCARDIA: ICD-10-CM

## 2023-03-08 DIAGNOSIS — E03.9 ACQUIRED HYPOTHYROIDISM: ICD-10-CM

## 2023-03-08 LAB — TACROLIMUS BLD-MCNC: 2.8 NG/ML

## 2023-03-08 PROCEDURE — 99214 OFFICE O/P EST MOD 30 MIN: CPT | Performed by: NURSE PRACTITIONER

## 2023-03-08 RX ORDER — DOCUSATE SODIUM 100 MG/1
100-200 CAPSULE, LIQUID FILLED ORAL 2 TIMES DAILY PRN
Qty: 60 CAPSULE | Refills: 5 | Status: SHIPPED | OUTPATIENT
Start: 2023-03-08 | End: 2023-03-31

## 2023-03-08 ASSESSMENT — FIBROSIS 4 INDEX: FIB4 SCORE: 3.89

## 2023-03-08 NOTE — ASSESSMENT & PLAN NOTE
Admitted to hospital from 2/18-2/20, went to ER for chest pain and weakness. She was found to have A-fib with RVR with rate varying from 110-130s. Troponins elevated and increasing concerning for NSTEMI. She was transferred to [facility] for possible intervention.  She was seen by cardiology, felt to be demand ischemia, not NSTEMI.  She continued to be tachycardic and developed chest pain and palpitations.  She underwent successful VELMA cardioversion.  She was started on amiodarone and metoprolol.  She was continued on her apixaban.     Seen by cardiology outpatient, she had ZIO patch placed for 3 days, today she had this placed again for 7 additional days. She does not know why they needed to do this. She has follow up with Cardiology APRN in 2 weeks.     Today she is feeling well aside from feeling some weakness. Taking medication as prescribed, however she is out of her Eliquis and needs refills.     Denies check pain, palpitations, heart racing, dizziness.

## 2023-03-09 PROBLEM — K59.03 DRUG-INDUCED CONSTIPATION: Status: ACTIVE | Noted: 2023-03-09

## 2023-03-09 NOTE — ASSESSMENT & PLAN NOTE
Chronic. Taking levothyroxine 75 mcg daily per endocrinology, increased from 50 mcg a few days ago due to recent labs showing under treatment.

## 2023-03-09 NOTE — PROGRESS NOTES
Subjective:     Tere Gallegos is a 72 y.o. female who presents for Hospital Follow-up.    POST DISCHARGE CALL:  Discharge Date:2/20/2023   Date of Outreach Call: 2/21/2023  3:37 PM  Now that you're home, how are you doing? Good  Did you receive any new prescriptions? Yes  Comment:amiodarone, metoprolol  Were you able to fill those medications? Yes  How did you fill those prescriptions? Meds to Beds  If not able to fill prescriptions, why? Other (Comment)  Comment:not ready from pharmacy yet, rxs sent today    Do you have questions about your medications? No  Comment:reviewed tapering of amiodarone; unable to  perform a complete medication review during TCM call  Do you have a follow-up appointment scheduled?Yes  Any issues or paperwork you wish to discuss with your PCP? complete medication reconcilation  Does patient qualify for CCM Program? Yes  If patient qualifies, was CCM Program Introduced? Yes  If patient does not qualify, comment? does not qualify  Number of Attempts: 1  Current or previous attempts completed within two business days of discharge? Yes  Provided education regarding treatment plan, medication, self-management, ADLs? Yes  Has patient completed Advance Directive? If yes, advise them to bring to appointment. No  Care Manager phone number provided? Yes  Is there anything else I can help you with? No  Chief Complaint? chest pain, palpitations  Admitting Dx? afib w/ RVR  Discharge Diagnosis? afib w/ RVR  Additional Comments? attended dialysis today. pt is feeing better    HPI:   Recently hospitalized for:    Paroxysmal A-fib (HCC)  Admitted to hospital from 2/18-2/20, went to ER for chest pain and weakness. She was found to have A-fib with RVR with rate varying from 110-130s. Troponins elevated and increasing concerning for NSTEMI. She was transferred to [facility] for possible intervention.  She was seen by cardiology, felt to be demand ischemia, not NSTEMI.  She continued to be  tachycardic and developed chest pain and palpitations.  She underwent successful VELMA cardioversion.  She was started on amiodarone and metoprolol.  She was continued on her apixaban.     Seen by cardiology outpatient, she had ZIO patch placed for 3 days, today she had this placed again for 7 additional days. She does not know why they needed to do this. She has follow up with Cardiology APRN in 2 weeks.     Today she is feeling well aside from feeling some weakness. Taking medication as prescribed, however she is out of her Eliquis and needs refills.     Denies check pain, palpitations, heart racing, dizziness.     Secondary hypercoagulable state (HCC)  Stable on eliquis, requesting refills    Acquired hypothyroidism  Chronic. Taking levothyroxine 75 mcg daily per endocrinology, increased from 50 mcg a few days ago due to recent labs showing under treatment.      Current medicines (including reconciliation performed today)  Current Outpatient Medications   Medication Sig Dispense Refill    apixaban (ELIQUIS) 5 MG Tab tablet Take 1 Tablet by mouth 2 times a day. 180 Tablet 1    docusate sodium (COLACE) 100 MG Cap Take 1-2 Capsules by mouth 2 times a day as needed for Constipation. Indications: Constipation 60 Capsule 5    metoprolol SR (TOPROL XL) 25 MG TABLET SR 24 HR Take 1 Tablet by mouth every evening. 30 Tablet 2    amiodarone (CORDARONE) 200 MG Tab Take 2 Tablets by mouth 2 times a day for 7 days, THEN 2 Tablets every day for 7 days, THEN 1 Tablet every day for 300 days. 58 Tablet 2    losartan (COZAAR) 100 MG Tab Take 1 Tablet by mouth every evening. 90 Tablet 3    levothyroxine (SYNTHROID) 75 MCG Tab Take 1 Tablet by mouth every morning on an empty stomach. 90 Tablet 4    furosemide (LASIX) 20 MG Tab Take 1 Tablet by mouth 1 time a day as needed (leg swelling). (Patient not taking: Reported on 3/3/2023) 30 Tablet 11    BD PEN NEEDLE PETE 2ND GEN USE AS DIRECTED WITH INSULIN PENS THREE TIMES DAILY BEFORE A  MEAL      doxazosin (CARDURA) 1 MG Tab Take 1 Tablet by mouth every evening. (Patient not taking: Reported on 3/3/2023) 30 Tablet 11    gabapentin (NEURONTIN) 100 MG Cap Take 1 Capsule by mouth every evening. 90 Capsule 3    insulin glargine (LANTUS SOLOSTAR) 100 UNIT/ML Solution Pen-injector injection Inject 5 Units under the skin every day.      amLODIPine (NORVASC) 10 MG Tab Take 10 mg by mouth every day.      glucose blood (ONETOUCH VERIO) strip 1 Strip by Other route as needed (on insulin checking 3-4 times a day). 150 Strip 6    Blood Glucose Monitoring Suppl (ONE TOUCH ULTRA 2) w/Device Kit 1 DEVICE 3 TIMES A DAY BEFORE MEALS. 1 Kit 0    atorvastatin (LIPITOR) 20 MG Tab Take 1 Tablet by mouth every evening. 100 Tablet 3    omeprazole (PRILOSEC) 20 MG delayed-release capsule Take 1 Capsule by mouth every day. (Patient not taking: Reported on 2/15/2023) 30 Capsule 0    Lancets Use one Freestyle Pearl lancet to test blood sugar once daily . 300 Each 3    sodium bicarbonate (SODIUM BICARBONATE) 650 MG Tab Take 2 Tablets by mouth in the morning, at noon, and at bedtime. (Patient not taking: Reported on 3/3/2023) 120 Tablet 3    fluticasone (FLONASE) 50 MCG/ACT nasal spray ADMINISTER 1 SPRAY INTO AFFECTED NOSTRIL(S) EVERY DAY. (Patient not taking: Reported on 3/3/2023) 16 mL 1    insulin aspart (NOVOLOG FLEXPEN) 100 UNIT/ML injection PEN Inject 2-8 Units under the skin 3 times a day before meals. Sliding Scale      predniSONE (DELTASONE) 5 MG Tab Take 2 Tablets by mouth every day.      mycophenolate (CELLCEPT) 500 MG tablet Take 2 Tablets by mouth 2 times a day.      tacrolimus (PROGRAF) 1 MG Cap Take 1 Capsule by mouth 2 times a day.       No current facility-administered medications for this visit.       Allergies:   Patient has no known allergies.    Social History     Tobacco Use    Smoking status: Never    Smokeless tobacco: Never   Vaping Use    Vaping Use: Never used   Substance Use Topics    Alcohol use: No  "    Alcohol/week: 0.0 oz    Drug use: No       ROS:  All positive ROS as per HPI, all other systems reviewed and are negative    Objective:     Vitals:    03/08/23 1528   BP: (!) 140/50   Pulse: 63   Temp: 36.4 °C (97.5 °F)   TempSrc: Temporal   SpO2: 93%   Weight: 60.3 kg (133 lb)   Height: 1.549 m (5' 1\")     Body mass index is 25.13 kg/m².    Physical Exam:  Constitutional: Alert, no distress.  Skin: Warm, dry, good turgor, no rashes in visible areas.  Eye: Equal, round and reactive, conjunctiva clear, lids normal.  ENMT: Lips without lesions, good dentition, oropharynx clear.  Neck: Trachea midline, no masses, no thyromegaly. No cervical or supraclavicular lymphadenopathy  Respiratory: Unlabored respiratory effort, lungs clear to auscultation, no wheezes, no ronchi.  Cardiovascular: Normal S1, S2, no murmur, no edema.  Abdomen: Soft, non-tender, no masses, no hepatosplenomegaly.  Psych: Alert and oriented x3, normal affect and mood.      Assessment and Plan:   1. Paroxysmal A-fib (HCC)  Stable  Continue amiodarone and metoprolol per cardiology  ZIO in place  Refilled eliquis  - apixaban (ELIQUIS) 5 MG Tab tablet; Take 1 Tablet by mouth 2 times a day.  Dispense: 180 Tablet; Refill: 1    2. Secondary hypercoagulable state (HCC)  - apixaban (ELIQUIS) 5 MG Tab tablet; Take 1 Tablet by mouth 2 times a day.  Dispense: 180 Tablet; Refill: 1    3. Acquired hypothyroidism  Unstable  Continue medication per endo, repeat labs in 8 weeks  - TSH; Future  - FREE THYROXINE; Future    4. Normocytic anemia  Stable  Check labs due to malaise, fatigue  - FERRITIN; Future  - IRON/TOTAL IRON BIND; Future  - VITAMIN B12; Future    5. Drug-induced constipation  Unstable  Refilled colace for as needed use.   - docusate sodium (COLACE) 100 MG Cap; Take 1-2 Capsules by mouth 2 times a day as needed for Constipation. Indications: Constipation  Dispense: 60 Capsule; Refill: 5      - Chart and discharge summary were reviewed.   - " Hospitalization and results reviewed with patient.   - Medications reviewed including instructions regarding high risk medications, dosing and side effects.  - Recommended Services: No services needed at this time  - Advance directive/POLST on file?  No     Follow-up:Return in about 3 months (around 6/8/2023).    Face-to-face transitional care management services with HIGH (today's visit is within days post discharge & LACE+ score 59+) medical decision complexity were provided.     LACE+ Historical Score Over Time (0-28: Low, 29-58: Medium, 59+: High): 64      Critical Care

## 2023-03-13 ENCOUNTER — HOSPITAL ENCOUNTER (OUTPATIENT)
Dept: LAB | Facility: MEDICAL CENTER | Age: 73
End: 2023-03-13
Attending: INTERNAL MEDICINE
Payer: MEDICARE

## 2023-03-13 LAB
ANION GAP SERPL CALC-SCNC: 11 MMOL/L (ref 7–16)
BASOPHILS # BLD AUTO: 0.5 % (ref 0–1.8)
BASOPHILS # BLD: 0.01 K/UL (ref 0–0.12)
BUN SERPL-MCNC: 23 MG/DL (ref 8–22)
CALCIUM SERPL-MCNC: 9.4 MG/DL (ref 8.4–10.2)
CHLORIDE SERPL-SCNC: 107 MMOL/L (ref 96–112)
CO2 SERPL-SCNC: 24 MMOL/L (ref 20–33)
CREAT SERPL-MCNC: 1.58 MG/DL (ref 0.5–1.4)
EOSINOPHIL # BLD AUTO: 0.04 K/UL (ref 0–0.51)
EOSINOPHIL NFR BLD: 2.1 % (ref 0–6.9)
ERYTHROCYTE [DISTWIDTH] IN BLOOD BY AUTOMATED COUNT: 42.5 FL (ref 35.9–50)
FASTING STATUS PATIENT QL REPORTED: NORMAL
GFR SERPLBLD CREATININE-BSD FMLA CKD-EPI: 34 ML/MIN/1.73 M 2
GLUCOSE SERPL-MCNC: 91 MG/DL (ref 65–99)
HCT VFR BLD AUTO: 30.9 % (ref 37–47)
HGB BLD-MCNC: 9.7 G/DL (ref 12–16)
IMM GRANULOCYTES # BLD AUTO: 0.03 K/UL (ref 0–0.11)
IMM GRANULOCYTES NFR BLD AUTO: 1.5 % (ref 0–0.9)
LYMPHOCYTES # BLD AUTO: 0.24 K/UL (ref 1–4.8)
LYMPHOCYTES NFR BLD: 12.4 % (ref 22–41)
MCH RBC QN AUTO: 28.9 PG (ref 27–33)
MCHC RBC AUTO-ENTMCNC: 31.4 G/DL (ref 33.6–35)
MCV RBC AUTO: 92 FL (ref 81.4–97.8)
MONOCYTES # BLD AUTO: 0.32 K/UL (ref 0–0.85)
MONOCYTES NFR BLD AUTO: 16.5 % (ref 0–13.4)
NEUTROPHILS # BLD AUTO: 1.3 K/UL (ref 2–7.15)
NEUTROPHILS NFR BLD: 67 % (ref 44–72)
NRBC # BLD AUTO: 0 K/UL
NRBC BLD-RTO: 0 /100 WBC
PHOSPHATE SERPL-MCNC: 2.7 MG/DL (ref 2.5–4.5)
PLATELET # BLD AUTO: 96 K/UL (ref 164–446)
PMV BLD AUTO: 12.3 FL (ref 9–12.9)
POTASSIUM SERPL-SCNC: 3.2 MMOL/L (ref 3.6–5.5)
RBC # BLD AUTO: 3.36 M/UL (ref 4.2–5.4)
SODIUM SERPL-SCNC: 142 MMOL/L (ref 135–145)
WBC # BLD AUTO: 1.9 K/UL (ref 4.8–10.8)

## 2023-03-13 PROCEDURE — 84100 ASSAY OF PHOSPHORUS: CPT

## 2023-03-13 PROCEDURE — 85025 COMPLETE CBC W/AUTO DIFF WBC: CPT

## 2023-03-13 PROCEDURE — 80197 ASSAY OF TACROLIMUS: CPT

## 2023-03-13 PROCEDURE — 36415 COLL VENOUS BLD VENIPUNCTURE: CPT

## 2023-03-13 PROCEDURE — 80048 BASIC METABOLIC PNL TOTAL CA: CPT

## 2023-03-14 LAB — TACROLIMUS BLD-MCNC: 2.8 NG/ML

## 2023-03-20 ENCOUNTER — HOSPITAL ENCOUNTER (OUTPATIENT)
Dept: LAB | Facility: MEDICAL CENTER | Age: 73
End: 2023-03-20
Attending: INTERNAL MEDICINE
Payer: MEDICARE

## 2023-03-20 LAB
ANION GAP SERPL CALC-SCNC: 13 MMOL/L (ref 7–16)
BASOPHILS # BLD AUTO: 0.8 % (ref 0–1.8)
BASOPHILS # BLD: 0.01 K/UL (ref 0–0.12)
BUN SERPL-MCNC: 24 MG/DL (ref 8–22)
CALCIUM SERPL-MCNC: 9.7 MG/DL (ref 8.4–10.2)
CHLORIDE SERPL-SCNC: 104 MMOL/L (ref 96–112)
CO2 SERPL-SCNC: 22 MMOL/L (ref 20–33)
CREAT SERPL-MCNC: 1.54 MG/DL (ref 0.5–1.4)
EOSINOPHIL # BLD AUTO: 0.01 K/UL (ref 0–0.51)
EOSINOPHIL NFR BLD: 0.8 % (ref 0–6.9)
ERYTHROCYTE [DISTWIDTH] IN BLOOD BY AUTOMATED COUNT: 42.3 FL (ref 35.9–50)
FASTING STATUS PATIENT QL REPORTED: NORMAL
GFR SERPLBLD CREATININE-BSD FMLA CKD-EPI: 35 ML/MIN/1.73 M 2
GLUCOSE SERPL-MCNC: 148 MG/DL (ref 65–99)
HCT VFR BLD AUTO: 32.3 % (ref 37–47)
HGB BLD-MCNC: 10.1 G/DL (ref 12–16)
IMM GRANULOCYTES # BLD AUTO: 0.01 K/UL (ref 0–0.11)
IMM GRANULOCYTES NFR BLD AUTO: 0.8 % (ref 0–0.9)
LYMPHOCYTES # BLD AUTO: 0.18 K/UL (ref 1–4.8)
LYMPHOCYTES NFR BLD: 14 % (ref 22–41)
MCH RBC QN AUTO: 28.8 PG (ref 27–33)
MCHC RBC AUTO-ENTMCNC: 31.3 G/DL (ref 33.6–35)
MCV RBC AUTO: 92 FL (ref 81.4–97.8)
MONOCYTES # BLD AUTO: 0.22 K/UL (ref 0–0.85)
MONOCYTES NFR BLD AUTO: 17.1 % (ref 0–13.4)
NEUTROPHILS # BLD AUTO: 0.86 K/UL (ref 2–7.15)
NEUTROPHILS NFR BLD: 66.5 % (ref 44–72)
NRBC # BLD AUTO: 0 K/UL
NRBC BLD-RTO: 0 /100 WBC
PHOSPHATE SERPL-MCNC: 2.5 MG/DL (ref 2.5–4.5)
PLATELET # BLD AUTO: 110 K/UL (ref 164–446)
PMV BLD AUTO: 11.8 FL (ref 9–12.9)
POTASSIUM SERPL-SCNC: 4.8 MMOL/L (ref 3.6–5.5)
RBC # BLD AUTO: 3.51 M/UL (ref 4.2–5.4)
SODIUM SERPL-SCNC: 139 MMOL/L (ref 135–145)
WBC # BLD AUTO: 1.3 K/UL (ref 4.8–10.8)

## 2023-03-20 PROCEDURE — 80048 BASIC METABOLIC PNL TOTAL CA: CPT

## 2023-03-20 PROCEDURE — 36415 COLL VENOUS BLD VENIPUNCTURE: CPT

## 2023-03-20 PROCEDURE — 80197 ASSAY OF TACROLIMUS: CPT

## 2023-03-20 PROCEDURE — 84100 ASSAY OF PHOSPHORUS: CPT

## 2023-03-20 PROCEDURE — 85025 COMPLETE CBC W/AUTO DIFF WBC: CPT

## 2023-03-21 LAB — TACROLIMUS BLD-MCNC: 4.3 NG/ML

## 2023-03-22 ENCOUNTER — OFFICE VISIT (OUTPATIENT)
Dept: CARDIOLOGY | Facility: MEDICAL CENTER | Age: 73
End: 2023-03-22
Payer: MEDICARE

## 2023-03-22 VITALS
BODY MASS INDEX: 24.92 KG/M2 | DIASTOLIC BLOOD PRESSURE: 60 MMHG | WEIGHT: 132 LBS | OXYGEN SATURATION: 96 % | SYSTOLIC BLOOD PRESSURE: 130 MMHG | HEART RATE: 76 BPM | RESPIRATION RATE: 16 BRPM | HEIGHT: 61 IN

## 2023-03-22 DIAGNOSIS — D68.69 SECONDARY HYPERCOAGULABLE STATE (HCC): ICD-10-CM

## 2023-03-22 DIAGNOSIS — I25.111 CORONARY ARTERY DISEASE INVOLVING NATIVE CORONARY ARTERY OF NATIVE HEART WITH ANGINA PECTORIS WITH DOCUMENTED SPASM (HCC): ICD-10-CM

## 2023-03-22 DIAGNOSIS — I15.0 RENOVASCULAR HYPERTENSION: ICD-10-CM

## 2023-03-22 DIAGNOSIS — Z79.01 CHRONIC ANTICOAGULATION: ICD-10-CM

## 2023-03-22 DIAGNOSIS — I48.0 PAROXYSMAL A-FIB (HCC): ICD-10-CM

## 2023-03-22 DIAGNOSIS — I50.31 ACUTE DIASTOLIC CHF (CONGESTIVE HEART FAILURE) (HCC): ICD-10-CM

## 2023-03-22 PROCEDURE — 99214 OFFICE O/P EST MOD 30 MIN: CPT

## 2023-03-22 RX ORDER — METOPROLOL SUCCINATE 25 MG/1
25 TABLET, EXTENDED RELEASE ORAL EVERY EVENING
Qty: 100 TABLET | Refills: 3 | Status: SHIPPED | OUTPATIENT
Start: 2023-03-22 | End: 2023-03-27

## 2023-03-22 ASSESSMENT — ENCOUNTER SYMPTOMS
ORTHOPNEA: 0
SHORTNESS OF BREATH: 0
PALPITATIONS: 0
NERVOUS/ANXIOUS: 0
PND: 0
CONSTITUTIONAL NEGATIVE: 1
DEPRESSION: 0
MUSCULOSKELETAL NEGATIVE: 1
EYES NEGATIVE: 1
GASTROINTESTINAL NEGATIVE: 1
NEUROLOGICAL NEGATIVE: 1

## 2023-03-22 ASSESSMENT — FIBROSIS 4 INDEX: FIB4 SCORE: 3.22

## 2023-03-22 NOTE — PROGRESS NOTES
Chief Complaint   Patient presents with    Congestive Heart Failure     F/V Dx: Acute diastolic CHF (congestive heart failure) (Formerly [facility])    Other     F/V Dx: Chronic anticoagulation       Subjective     Tere Gallegos is a 72 y.o. female who presents today for follow-up of her A-fib, hypertension, and CHF.  They have a history of HLD with CAD with prior PCI, chronic anemia, IDDM with renal transplant with prior ESRD, HTN, PAF on chronic anticoagulation, and diastolic heart failure.    They are accompanied today by her son    They are a patient of Dr. Hernandez, Leidy Clay, and Pamela Cuenca, last seen on 2/9/2023, at that time she was having some confusions about her medications.  Since her last appointment she was admitted to the hospital from 2/18-2/20, she was found to be in A-fib with RVR, she underwent successful VELMA DCCV, was discharged on amiodarone and metoprolol.    She is not sure what medications she is currently taking.  We went over the importance of maintaining an accurate medication list.  We will check in with her on Monday when we can talk with her son who is going to assist her in providing us with her current medications.    Since their last visit they have been feeling overall well, only complaint is some slight weakness.    No symptoms of chest pain, palpitations, shortness of breath, exercise intolerance, dyspnea, or lower extremity edema.      Past Medical History:   Diagnosis Date    Acquired hypothyroidism 05/04/2020    CAD (coronary artery disease)     Chronic diastolic heart failure (Formerly [facility]) 05/04/2020    Coronary artery disease due to lipid rich plaque     2 Synergy NOEMI to 100% RCA stent placed    Dental disorder     partial dentures- uppers    Diabetes (Formerly [facility])     oral medication    ESRD (end stage renal disease) on dialysis (Formerly [facility]) 05/04/2020    Hemodialysis patient (Formerly [facility])     M, W, F    Hyperlipidemia     Hypertension     Kidney transplant candidate     Kidney transplant recipient  10/31/2022    Presence of drug-eluting stent in right coronary artery     QT prolongation 2020    RLS (restless legs syndrome) 2016    Transaminitis 2018     Past Surgical History:   Procedure Laterality Date    OTHER ABDOMINAL SURGERY Right 10/31/2022     Donor kidney transplant - [facility]    Z CARDIAC CATH  2016    RCA stented with 2 Synergy drug-eluting stents.    RECOVERY  2016    Procedure: CATH LAB [facility] WITH POSSIBLE DR. CASTILLO;  Surgeon: Recoveryonly Surgery;  [place] [facility]  Service:     Z CARDIAC CATH  2016    100% RCA    OTHER Left 2014    left arm upper extremity fistula    OTHER ABDOMINAL SURGERY      left kidney removed due to cancer     Family History   Problem Relation Age of Onset    Diabetes Sister     Other Sister         liver disease    Diabetes Brother     Heart Disease Neg Hx      Social History     Socioeconomic History    Marital status:      Spouse name: Not on file    Number of children: Not on file    Years of education: Not on file    Highest education level: Not on file   Occupational History    Not on file   Tobacco Use    Smoking status: Never    Smokeless tobacco: Never   Vaping Use    Vaping Use: Never used   Substance and Sexual Activity    Alcohol use: No     Alcohol/week: 0.0 oz    Drug use: No    Sexual activity: Never   Other Topics Concern    Not on file   Social History Narrative    Not on file     Social Determinants of Health     Financial Resource Strain: Not on file   Food Insecurity: Not on file   Transportation Needs: Not on file   Physical Activity: Not on file   Stress: Not on file   Social Connections: Not on file   Intimate Partner Violence: Not on file   Housing Stability: Not on file     No Known Allergies  Outpatient Encounter Medications as of 3/22/2023   Medication Sig Dispense Refill    apixaban (ELIQUIS) 5 MG Tab tablet Take 1 Tablet by mouth 2 times a day.  180 Tablet 1    docusate sodium (COLACE) 100 MG Cap Take 1-2 Capsules by mouth 2 times a day as needed for Constipation. Indications: Constipation 60 Capsule 5    levothyroxine (SYNTHROID) 75 MCG Tab Take 1 Tablet by mouth every morning on an empty stomach. 90 Tablet 4    metoprolol SR (TOPROL XL) 25 MG TABLET SR 24 HR Take 1 Tablet by mouth every evening. 30 Tablet 2    amiodarone (CORDARONE) 200 MG Tab Take 2 Tablets by mouth 2 times a day for 7 days, THEN 2 Tablets every day for 7 days, THEN 1 Tablet every day for 300 days. 58 Tablet 2    BD PEN NEEDLE PETE 2ND GEN USE AS DIRECTED WITH INSULIN PENS THREE TIMES DAILY BEFORE A MEAL      insulin glargine (LANTUS SOLOSTAR) 100 UNIT/ML Solution Pen-injector injection Inject 5 Units under the skin every day.      amLODIPine (NORVASC) 10 MG Tab Take 10 mg by mouth every day.      losartan (COZAAR) 100 MG Tab Take 1 Tablet by mouth every evening. 90 Tablet 3    glucose blood (ONETOUCH VERIO) strip 1 Strip by Other route as needed (on insulin checking 3-4 times a day). 150 Strip 6    Blood Glucose Monitoring Suppl (ONE TOUCH ULTRA 2) w/Device Kit 1 DEVICE 3 TIMES A DAY BEFORE MEALS. 1 Kit 0    atorvastatin (LIPITOR) 20 MG Tab Take 1 Tablet by mouth every evening. 100 Tablet 3    Lancets Use one Freestyle Pearl lancet to test blood sugar once daily . 300 Each 3    insulin aspart (NOVOLOG FLEXPEN) 100 UNIT/ML injection PEN Inject 2-8 Units under the skin 3 times a day before meals. Sliding Scale      predniSONE (DELTASONE) 5 MG Tab Take 2 Tablets by mouth every day.      mycophenolate (CELLCEPT) 500 MG tablet Take 2 Tablets by mouth 2 times a day.      tacrolimus (PROGRAF) 1 MG Cap Take 1 Capsule by mouth 2 times a day.      furosemide (LASIX) 20 MG Tab Take 1 Tablet by mouth 1 time a day as needed (leg swelling). (Patient not taking: Reported on 3/3/2023) 30 Tablet 11    doxazosin (CARDURA) 1 MG Tab Take 1 Tablet by mouth every evening. (Patient not taking: Reported on  "3/3/2023) 30 Tablet 11    gabapentin (NEURONTIN) 100 MG Cap Take 1 Capsule by mouth every evening. 90 Capsule 3    [DISCONTINUED] omeprazole (PRILOSEC) 20 MG delayed-release capsule Take 1 Capsule by mouth every day. (Patient not taking: Reported on 2/15/2023) 30 Capsule 0    sodium bicarbonate (SODIUM BICARBONATE) 650 MG Tab Take 2 Tablets by mouth in the morning, at noon, and at bedtime. (Patient not taking: Reported on 3/3/2023) 120 Tablet 3    fluticasone (FLONASE) 50 MCG/ACT nasal spray ADMINISTER 1 SPRAY INTO AFFECTED NOSTRIL(S) EVERY DAY. (Patient not taking: Reported on 3/3/2023) 16 mL 1     No facility-administered encounter medications on file as of 3/22/2023.     Review of Systems   Constitutional: Negative.    HENT: Negative.     Eyes: Negative.    Respiratory:  Negative for shortness of breath.    Cardiovascular:  Positive for leg swelling. Negative for chest pain, palpitations, orthopnea and PND.   Gastrointestinal: Negative.    Genitourinary: Negative.    Musculoskeletal: Negative.    Skin: Negative.    Neurological: Negative.    Endo/Heme/Allergies: Negative.    Psychiatric/Behavioral:  Negative for depression. The patient is not nervous/anxious.             Objective     /60 (BP Location: Left arm, Patient Position: Sitting, BP Cuff Size: Adult)   Pulse 76   Resp 16   Ht 1.549 m (5' 1\")   Wt 59.9 kg (132 lb)   LMP  (LMP Unknown)   SpO2 96%   BMI 24.94 kg/m²     Physical Exam  Constitutional:       Appearance: Normal appearance.   HENT:      Head: Normocephalic and atraumatic.   Neck:      Vascular: No JVD.   Cardiovascular:      Rate and Rhythm: Normal rate and regular rhythm.      Pulses: Normal pulses.      Heart sounds: Normal heart sounds. No murmur heard.    No friction rub.   Pulmonary:      Effort: Pulmonary effort is normal. No respiratory distress.      Breath sounds: Normal breath sounds.   Abdominal:      General: There is no distension.      Palpations: Abdomen is soft. "   Musculoskeletal:      Right lower leg: No edema.      Left lower leg: No edema.   Skin:     General: Skin is warm and dry.   Neurological:      General: No focal deficit present.      Mental Status: She is alert and oriented to person, place, and time.   Psychiatric:         Mood and Affect: Mood normal.         Behavior: Behavior normal.          Angiogram 6/8/2021   FINDINGS:  I. HEMODYNAMICS:               Ao: 110/54 mmHg              LEDP: 22 mmHg              Gradient on LV pullback: No     II. LEFT VENTRICULOGRAM:              LVEF JOHNSON PROJECTION: 70%                III. CORONARY ANGIOGRAPHY:  Left Main: Large caliber bifurcating no CAD.  Left Anterior Descending: Moderate to large caliber vessel with usual complement of diagonals.  Mild nonobstructive CAD.  Left Circumflex: Large caliber Co-dominant supplying multiple large tortuous obtuse marginals with mild nonobstructive CAD.  Right Coronary: Small caliber supplying a moderate-sized RPDA.  This is a codominant fashion.  There is a widely patent long segment of previously placed stents in the midportion.    Stress test 12/20/2022  Myocardial Perfusion   Report   NUCLEAR IMAGING INTERPRETATION   Normal Lexiscan myocardial perfusion study.   No evidence of ischemia or infarct.   SDS 0.   LVEF 70%.   No ischemic changes with Regadenoson.   No arrhythmias.   No chest pain.   ECG INTERPRETATION   Negative stress ECG for ischemia.    Cardiac event monitor 2/6/2023  Findings:   Underlying rhythm: Predominantly sinus rhythm with average rate 56 bpm. No atrial fibrillation nor flutter detected.   Atrial events: Rare ectopy. 17 episodes supraventricular tachycardia (SVT); fastest 156 bpm and longest 12 beats.   Ventricular events: Rare ectopy.   Patient events: Triggered symptoms associated with sinus rhythm.   Impressions:   Predominantly sinus rhythm.   Asymptomatic, short SVT.   Symptoms associated with sinus rhythm.    Echocardiogram  2/18/2023  CONCLUSIONS  Prior echocardiogram 11/28/2022, patient in A fib with RVR during this   study.  Patient in atrial fibrillation with RVR during the study limiting   accurate interpretation.  Preserved biventricular systolic functions, estimated LVEF 55%  Dilated LA  No significant valvular abnormalities.  No pericardial effusion.  Normal IVC size.    Assessment & Plan     1. Paroxysmal A-fib (HCC)        2. Acute diastolic CHF (congestive heart failure) ([facility])        3. Coronary artery disease involving native coronary artery of native heart with angina pectoris with documented spasm ([facility])        4. Renovascular hypertension        5. Chronic anticoagulation            Medical Decision Making: Today's Assessment/Status/Plan:        Paroxysmal atrial fibrillation  -Symptoms:none  -[id number] Score (CHF/CM, HTN, Age, DM, CVA, Vascular disease, Gender): At least 5  -3 day Cardiac monitor did not show any atrial fibrillation  -Medications: Amiodarone, metoprolol  -Anticoagulation: Eliquis 5 mg twice daily (she is on the cusp of needing a dose reduction, follow closely)  -[facility]?:  On 2/19/2023  -Patient reportedly had 7-day cardiac monitor recently, I am looking for the results for this    Heart failure with preserved EF  Continue Lasix 20 mg daily as needed for swelling  Continue losartan 100 mg every evening, continue metoprolol  -Has follow-up in the heart failure clinic in June    Coronary Artery Disease  - continue atorvastatin 20 mg daily, Toprol 25 mg daily, on Eliquis  We addressed the management of atherosclerotic artery disease.  She is on proper antiplatelet, cholesterol management and beta-blockers as appropriate.  We addressed the potential side effects and laboratory follow-up for these medications.    Hypertension  - improved control  - continue medications as outlined above  - goal < 130/80  - check BP daily 2 hours after taking medication and keep log of measurements     Of note patient was  unable to provide an accurate medication list, we discussed length the importance of knowing exactly what medication she is taking and for what.  In order to facilitate communication about her medications we are planning on calling her son on Monday who will assist in identifying what medication she has and what she is taking.  Additionally I have printed out a medication list for her at this visit and have requested that she give us a call if there are any discrepancies.    Follow-up with SAMMI Fisher in June as scheduled    This note was dictated using Dragon speech recognition software.

## 2023-03-24 ENCOUNTER — TELEPHONE (OUTPATIENT)
Dept: CARDIOLOGY | Facility: MEDICAL CENTER | Age: 73
End: 2023-03-24
Payer: MEDICARE

## 2023-03-24 PROCEDURE — 93268 ECG RECORD/REVIEW: CPT | Performed by: INTERNAL MEDICINE

## 2023-03-27 ENCOUNTER — TELEPHONE (OUTPATIENT)
Dept: CARDIOLOGY | Facility: MEDICAL CENTER | Age: 73
End: 2023-03-27
Payer: MEDICARE

## 2023-03-27 ENCOUNTER — HOSPITAL ENCOUNTER (OUTPATIENT)
Dept: LAB | Facility: MEDICAL CENTER | Age: 73
End: 2023-03-27
Attending: INTERNAL MEDICINE
Payer: MEDICARE

## 2023-03-27 DIAGNOSIS — I48.0 PAROXYSMAL A-FIB (HCC): ICD-10-CM

## 2023-03-27 LAB
ANION GAP SERPL CALC-SCNC: 10 MMOL/L (ref 7–16)
BASOPHILS # BLD AUTO: 0.5 % (ref 0–1.8)
BASOPHILS # BLD: 0.01 K/UL (ref 0–0.12)
BUN SERPL-MCNC: 23 MG/DL (ref 8–22)
CALCIUM SERPL-MCNC: 9.7 MG/DL (ref 8.4–10.2)
CHLORIDE SERPL-SCNC: 106 MMOL/L (ref 96–112)
CO2 SERPL-SCNC: 23 MMOL/L (ref 20–33)
CREAT SERPL-MCNC: 1.63 MG/DL (ref 0.5–1.4)
EOSINOPHIL # BLD AUTO: 0.02 K/UL (ref 0–0.51)
EOSINOPHIL NFR BLD: 0.9 % (ref 0–6.9)
ERYTHROCYTE [DISTWIDTH] IN BLOOD BY AUTOMATED COUNT: 41.7 FL (ref 35.9–50)
FASTING STATUS PATIENT QL REPORTED: NORMAL
GFR SERPLBLD CREATININE-BSD FMLA CKD-EPI: 33 ML/MIN/1.73 M 2
GLUCOSE SERPL-MCNC: 89 MG/DL (ref 65–99)
HCT VFR BLD AUTO: 31.8 % (ref 37–47)
HGB BLD-MCNC: 10.2 G/DL (ref 12–16)
IMM GRANULOCYTES # BLD AUTO: 0.02 K/UL (ref 0–0.11)
IMM GRANULOCYTES NFR BLD AUTO: 0.9 % (ref 0–0.9)
LYMPHOCYTES # BLD AUTO: 0.32 K/UL (ref 1–4.8)
LYMPHOCYTES NFR BLD: 14.8 % (ref 22–41)
MCH RBC QN AUTO: 29 PG (ref 27–33)
MCHC RBC AUTO-ENTMCNC: 32.1 G/DL (ref 33.6–35)
MCV RBC AUTO: 90.3 FL (ref 81.4–97.8)
MONOCYTES # BLD AUTO: 0.27 K/UL (ref 0–0.85)
MONOCYTES NFR BLD AUTO: 12.5 % (ref 0–13.4)
NEUTROPHILS # BLD AUTO: 1.52 K/UL (ref 2–7.15)
NEUTROPHILS NFR BLD: 70.4 % (ref 44–72)
NRBC # BLD AUTO: 0 K/UL
NRBC BLD-RTO: 0 /100 WBC
PHOSPHATE SERPL-MCNC: 2.7 MG/DL (ref 2.5–4.5)
PLATELET # BLD AUTO: 115 K/UL (ref 164–446)
PMV BLD AUTO: 11.5 FL (ref 9–12.9)
POTASSIUM SERPL-SCNC: 4 MMOL/L (ref 3.6–5.5)
RBC # BLD AUTO: 3.52 M/UL (ref 4.2–5.4)
SODIUM SERPL-SCNC: 139 MMOL/L (ref 135–145)
WBC # BLD AUTO: 2.2 K/UL (ref 4.8–10.8)

## 2023-03-27 PROCEDURE — 80197 ASSAY OF TACROLIMUS: CPT

## 2023-03-27 PROCEDURE — 36415 COLL VENOUS BLD VENIPUNCTURE: CPT

## 2023-03-27 PROCEDURE — 80048 BASIC METABOLIC PNL TOTAL CA: CPT

## 2023-03-27 PROCEDURE — 85025 COMPLETE CBC W/AUTO DIFF WBC: CPT

## 2023-03-27 PROCEDURE — 84100 ASSAY OF PHOSPHORUS: CPT

## 2023-03-27 NOTE — TELEPHONE ENCOUNTER
"Phone Number Called: [phone]    Call outcome:  Spoke to patients Manoj Soares     Message: Called to inform patient of  recommendations, \"okay for patient to not be taking amiodarone and metoprolol for now.  Patient does need to have Lasix 20 mg as needed for swelling.  I would like patient to come in and get a 2-week not live ZIO monitor.  Can you please order that and let the patient know that I would like her to complete it. \"    Ordered placed.     "

## 2023-03-27 NOTE — TELEPHONE ENCOUNTER
Please update patient's medication list to reflect what she is currently taking, okay for patient to not be taking amiodarone and metoprolol for now.  Patient does need to have Lasix 20 mg as needed for swelling.  I would like patient to come in and get a 2-week not live ZIO monitor.  Can you please order that and let the patient know that I would like her to complete it.

## 2023-03-27 NOTE — TELEPHONE ENCOUNTER
Phone Number Called: [phone]    Call outcome:  Spoke with patients Manoj Soares    Message: Called patients Manoj Soares to discuss medications:  Discussed results of heart monitor readings with patients Son as well.       Taking: Only medications mentioned  Tacrolimus  Mycophenolate  Prednisone  Atorvastatin 20mg once daily  Levothyroxine   Eliquis 2.5mg BID  Amlodipine 10mg  once daily  Doxazosin 1 mg once daily  Losartan 100 mg once daily      NOT Taking:  Amiodarone  Lasix  Metoprolol     Patients Son has not recently taken blood pressure, said he will and asked if we could call back in a few days.     LH- Please advise.

## 2023-03-27 NOTE — TELEPHONE ENCOUNTER
----- Message from ANGELITA Ortega sent at 3/22/2023  3:16 PM PDT -----  Regarding: Check-in  Can you please call this patient's son Manoj: [phone] around 4:30?  The patient has a very hard time remembering what medications she is on, her son was supposed to help facilitate making sure that our medication list and hers match, can you please let me know if there are any discrepancies.  Thank you, LH

## 2023-03-29 ENCOUNTER — NON-PROVIDER VISIT (OUTPATIENT)
Dept: CARDIOLOGY | Facility: MEDICAL CENTER | Age: 73
End: 2023-03-29
Payer: MEDICARE

## 2023-03-29 DIAGNOSIS — I47.10 SVT (SUPRAVENTRICULAR TACHYCARDIA) (HCC): ICD-10-CM

## 2023-03-29 DIAGNOSIS — I49.1 PREMATURE ATRIAL CONTRACTIONS: ICD-10-CM

## 2023-03-29 DIAGNOSIS — I49.3 PVCS (PREMATURE VENTRICULAR CONTRACTIONS): ICD-10-CM

## 2023-03-29 LAB — TACROLIMUS BLD-MCNC: 2.6 NG/ML

## 2023-03-29 NOTE — PROGRESS NOTES
Patient enrolled in the 14 day Zio XT Holter monitoring program per SAMMI Hines.   >In-Clinic hook-up, serial # [id number].   >Currently pending EOS.

## 2023-03-30 ENCOUNTER — HOSPITAL ENCOUNTER (OUTPATIENT)
Dept: LAB | Facility: MEDICAL CENTER | Age: 73
End: 2023-03-30
Attending: INTERNAL MEDICINE
Payer: MEDICARE

## 2023-03-30 LAB
ANION GAP SERPL CALC-SCNC: 12 MMOL/L (ref 7–16)
BASOPHILS # BLD AUTO: 0.5 % (ref 0–1.8)
BASOPHILS # BLD: 0.02 K/UL (ref 0–0.12)
BUN SERPL-MCNC: 26 MG/DL (ref 8–22)
CALCIUM SERPL-MCNC: 9.6 MG/DL (ref 8.4–10.2)
CHLORIDE SERPL-SCNC: 109 MMOL/L (ref 96–112)
CO2 SERPL-SCNC: 21 MMOL/L (ref 20–33)
CREAT SERPL-MCNC: 1.53 MG/DL (ref 0.5–1.4)
EOSINOPHIL # BLD AUTO: 0.04 K/UL (ref 0–0.51)
EOSINOPHIL NFR BLD: 1.1 % (ref 0–6.9)
ERYTHROCYTE [DISTWIDTH] IN BLOOD BY AUTOMATED COUNT: 41.9 FL (ref 35.9–50)
GFR SERPLBLD CREATININE-BSD FMLA CKD-EPI: 36 ML/MIN/1.73 M 2
GLUCOSE SERPL-MCNC: 89 MG/DL (ref 65–99)
HCT VFR BLD AUTO: 31.3 % (ref 37–47)
HGB BLD-MCNC: 9.8 G/DL (ref 12–16)
IMM GRANULOCYTES # BLD AUTO: 0.02 K/UL (ref 0–0.11)
IMM GRANULOCYTES NFR BLD AUTO: 0.5 % (ref 0–0.9)
LYMPHOCYTES # BLD AUTO: 0.29 K/UL (ref 1–4.8)
LYMPHOCYTES NFR BLD: 7.8 % (ref 22–41)
MCH RBC QN AUTO: 28.5 PG (ref 27–33)
MCHC RBC AUTO-ENTMCNC: 31.3 G/DL (ref 33.6–35)
MCV RBC AUTO: 91 FL (ref 81.4–97.8)
MONOCYTES # BLD AUTO: 0.35 K/UL (ref 0–0.85)
MONOCYTES NFR BLD AUTO: 9.4 % (ref 0–13.4)
NEUTROPHILS # BLD AUTO: 2.99 K/UL (ref 2–7.15)
NEUTROPHILS NFR BLD: 80.7 % (ref 44–72)
NRBC # BLD AUTO: 0 K/UL
NRBC BLD-RTO: 0 /100 WBC
PHOSPHATE SERPL-MCNC: 2.6 MG/DL (ref 2.5–4.5)
PLATELET # BLD AUTO: 106 K/UL (ref 164–446)
PMV BLD AUTO: 11.3 FL (ref 9–12.9)
POTASSIUM SERPL-SCNC: 4.4 MMOL/L (ref 3.6–5.5)
RBC # BLD AUTO: 3.44 M/UL (ref 4.2–5.4)
SODIUM SERPL-SCNC: 142 MMOL/L (ref 135–145)
WBC # BLD AUTO: 3.7 K/UL (ref 4.8–10.8)

## 2023-03-30 PROCEDURE — 80197 ASSAY OF TACROLIMUS: CPT

## 2023-03-30 PROCEDURE — 84100 ASSAY OF PHOSPHORUS: CPT

## 2023-03-30 PROCEDURE — 85025 COMPLETE CBC W/AUTO DIFF WBC: CPT

## 2023-03-30 PROCEDURE — 80048 BASIC METABOLIC PNL TOTAL CA: CPT

## 2023-03-30 PROCEDURE — 36415 COLL VENOUS BLD VENIPUNCTURE: CPT

## 2023-03-31 ENCOUNTER — APPOINTMENT (OUTPATIENT)
Dept: RADIOLOGY | Facility: MEDICAL CENTER | Age: 73
DRG: 193 | End: 2023-03-31
Attending: INTERNAL MEDICINE
Payer: MEDICARE

## 2023-03-31 ENCOUNTER — APPOINTMENT (OUTPATIENT)
Dept: RADIOLOGY | Facility: MEDICAL CENTER | Age: 73
DRG: 193 | End: 2023-03-31
Attending: EMERGENCY MEDICINE
Payer: MEDICARE

## 2023-03-31 ENCOUNTER — HOSPITAL ENCOUNTER (INPATIENT)
Facility: MEDICAL CENTER | Age: 73
LOS: 8 days | DRG: 193 | End: 2023-04-08
Attending: EMERGENCY MEDICINE | Admitting: INTERNAL MEDICINE
Payer: MEDICARE

## 2023-03-31 DIAGNOSIS — R07.9 CHEST PAIN, UNSPECIFIED TYPE: ICD-10-CM

## 2023-03-31 DIAGNOSIS — I16.0 HYPERTENSIVE URGENCY: ICD-10-CM

## 2023-03-31 DIAGNOSIS — J18.9 PNEUMONIA DUE TO INFECTIOUS ORGANISM, UNSPECIFIED LATERALITY, UNSPECIFIED PART OF LUNG: ICD-10-CM

## 2023-03-31 DIAGNOSIS — N18.32 STAGE 3B CHRONIC KIDNEY DISEASE: ICD-10-CM

## 2023-03-31 DIAGNOSIS — I50.31 ACUTE DIASTOLIC CHF (CONGESTIVE HEART FAILURE) (HCC): ICD-10-CM

## 2023-03-31 DIAGNOSIS — D63.1 ANEMIA DUE TO STAGE 3B CHRONIC KIDNEY DISEASE: ICD-10-CM

## 2023-03-31 DIAGNOSIS — R09.02 HYPOXIA: ICD-10-CM

## 2023-03-31 DIAGNOSIS — N18.32 ANEMIA DUE TO STAGE 3B CHRONIC KIDNEY DISEASE: ICD-10-CM

## 2023-03-31 DIAGNOSIS — R79.89 ELEVATED TROPONIN: ICD-10-CM

## 2023-03-31 PROBLEM — N18.30 CKD (CHRONIC KIDNEY DISEASE), STAGE III: Status: ACTIVE | Noted: 2023-03-31

## 2023-03-31 LAB
ALBUMIN SERPL BCP-MCNC: 4.2 G/DL (ref 3.2–4.9)
ALBUMIN/GLOB SERPL: 1.4 G/DL
ALP SERPL-CCNC: 87 U/L (ref 30–99)
ALT SERPL-CCNC: 29 U/L (ref 2–50)
ANION GAP SERPL CALC-SCNC: 14 MMOL/L (ref 7–16)
AST SERPL-CCNC: 21 U/L (ref 12–45)
BASOPHILS # BLD AUTO: 0.3 % (ref 0–1.8)
BASOPHILS # BLD: 0.02 K/UL (ref 0–0.12)
BILIRUB SERPL-MCNC: 0.8 MG/DL (ref 0.1–1.5)
BUN SERPL-MCNC: 24 MG/DL (ref 8–22)
CALCIUM ALBUM COR SERPL-MCNC: 9.6 MG/DL (ref 8.5–10.5)
CALCIUM SERPL-MCNC: 9.8 MG/DL (ref 8.5–10.5)
CHLORIDE SERPL-SCNC: 106 MMOL/L (ref 96–112)
CO2 SERPL-SCNC: 18 MMOL/L (ref 20–33)
CREAT SERPL-MCNC: 1.5 MG/DL (ref 0.5–1.4)
EKG IMPRESSION: NORMAL
EOSINOPHIL # BLD AUTO: 0.01 K/UL (ref 0–0.51)
EOSINOPHIL NFR BLD: 0.1 % (ref 0–6.9)
ERYTHROCYTE [DISTWIDTH] IN BLOOD BY AUTOMATED COUNT: 42.5 FL (ref 35.9–50)
FLUAV RNA SPEC QL NAA+PROBE: NEGATIVE
FLUBV RNA SPEC QL NAA+PROBE: NEGATIVE
GFR SERPLBLD CREATININE-BSD FMLA CKD-EPI: 36 ML/MIN/1.73 M 2
GLOBULIN SER CALC-MCNC: 3 G/DL (ref 1.9–3.5)
GLUCOSE SERPL-MCNC: 147 MG/DL (ref 65–99)
HCT VFR BLD AUTO: 32.2 % (ref 37–47)
HGB BLD-MCNC: 10.1 G/DL (ref 12–16)
IMM GRANULOCYTES # BLD AUTO: 0.04 K/UL (ref 0–0.11)
IMM GRANULOCYTES NFR BLD AUTO: 0.5 % (ref 0–0.9)
LACTATE SERPL-SCNC: 1.4 MMOL/L (ref 0.5–2)
LACTATE SERPL-SCNC: 1.4 MMOL/L (ref 0.5–2)
LYMPHOCYTES # BLD AUTO: 0.2 K/UL (ref 1–4.8)
LYMPHOCYTES NFR BLD: 2.7 % (ref 22–41)
MCH RBC QN AUTO: 28.6 PG (ref 27–33)
MCHC RBC AUTO-ENTMCNC: 31.4 G/DL (ref 33.6–35)
MCV RBC AUTO: 91.2 FL (ref 81.4–97.8)
MONOCYTES # BLD AUTO: 0.43 K/UL (ref 0–0.85)
MONOCYTES NFR BLD AUTO: 5.9 % (ref 0–13.4)
NEUTROPHILS # BLD AUTO: 6.62 K/UL (ref 2–7.15)
NEUTROPHILS NFR BLD: 90.5 % (ref 44–72)
NRBC # BLD AUTO: 0 K/UL
NRBC BLD-RTO: 0 /100 WBC
PLATELET # BLD AUTO: 95 K/UL (ref 164–446)
PMV BLD AUTO: 11.3 FL (ref 9–12.9)
POTASSIUM SERPL-SCNC: 4.1 MMOL/L (ref 3.6–5.5)
PROCALCITONIN SERPL-MCNC: 0.07 NG/ML
PROT SERPL-MCNC: 7.2 G/DL (ref 6–8.2)
RBC # BLD AUTO: 3.53 M/UL (ref 4.2–5.4)
RSV RNA SPEC QL NAA+PROBE: NEGATIVE
SARS-COV-2 RNA RESP QL NAA+PROBE: NOTDETECTED
SODIUM SERPL-SCNC: 138 MMOL/L (ref 135–145)
SPECIMEN SOURCE: NORMAL
TROPONIN T SERPL-MCNC: 26 NG/L (ref 6–19)
WBC # BLD AUTO: 7.3 K/UL (ref 4.8–10.8)

## 2023-03-31 PROCEDURE — 83605 ASSAY OF LACTIC ACID: CPT | Mod: 91

## 2023-03-31 PROCEDURE — 0241U HCHG SARS-COV-2 COVID-19 NFCT DS RESP RNA 4 TRGT MIC: CPT

## 2023-03-31 PROCEDURE — 84484 ASSAY OF TROPONIN QUANT: CPT

## 2023-03-31 PROCEDURE — 99285 EMERGENCY DEPT VISIT HI MDM: CPT

## 2023-03-31 PROCEDURE — 80053 COMPREHEN METABOLIC PANEL: CPT

## 2023-03-31 PROCEDURE — 71045 X-RAY EXAM CHEST 1 VIEW: CPT

## 2023-03-31 PROCEDURE — 93005 ELECTROCARDIOGRAM TRACING: CPT

## 2023-03-31 PROCEDURE — 36415 COLL VENOUS BLD VENIPUNCTURE: CPT

## 2023-03-31 PROCEDURE — C9803 HOPD COVID-19 SPEC COLLECT: HCPCS | Performed by: EMERGENCY MEDICINE

## 2023-03-31 PROCEDURE — 96365 THER/PROPH/DIAG IV INF INIT: CPT

## 2023-03-31 PROCEDURE — 700102 HCHG RX REV CODE 250 W/ 637 OVERRIDE(OP): Performed by: EMERGENCY MEDICINE

## 2023-03-31 PROCEDURE — 770020 HCHG ROOM/CARE - TELE (206)

## 2023-03-31 PROCEDURE — 85025 COMPLETE CBC W/AUTO DIFF WBC: CPT

## 2023-03-31 PROCEDURE — 96375 TX/PRO/DX INJ NEW DRUG ADDON: CPT

## 2023-03-31 PROCEDURE — 93005 ELECTROCARDIOGRAM TRACING: CPT | Performed by: EMERGENCY MEDICINE

## 2023-03-31 PROCEDURE — 700102 HCHG RX REV CODE 250 W/ 637 OVERRIDE(OP): Performed by: INTERNAL MEDICINE

## 2023-03-31 PROCEDURE — 99291 CRITICAL CARE FIRST HOUR: CPT | Performed by: INTERNAL MEDICINE

## 2023-03-31 PROCEDURE — 93005 ELECTROCARDIOGRAM TRACING: CPT | Performed by: INTERNAL MEDICINE

## 2023-03-31 PROCEDURE — 700111 HCHG RX REV CODE 636 W/ 250 OVERRIDE (IP): Performed by: EMERGENCY MEDICINE

## 2023-03-31 PROCEDURE — A9270 NON-COVERED ITEM OR SERVICE: HCPCS | Performed by: EMERGENCY MEDICINE

## 2023-03-31 PROCEDURE — 700105 HCHG RX REV CODE 258: Performed by: EMERGENCY MEDICINE

## 2023-03-31 PROCEDURE — 87040 BLOOD CULTURE FOR BACTERIA: CPT | Mod: 91

## 2023-03-31 PROCEDURE — 700111 HCHG RX REV CODE 636 W/ 250 OVERRIDE (IP): Performed by: INTERNAL MEDICINE

## 2023-03-31 PROCEDURE — 99223 1ST HOSP IP/OBS HIGH 75: CPT | Mod: 25,AI | Performed by: INTERNAL MEDICINE

## 2023-03-31 PROCEDURE — A9270 NON-COVERED ITEM OR SERVICE: HCPCS | Performed by: INTERNAL MEDICINE

## 2023-03-31 PROCEDURE — 84145 PROCALCITONIN (PCT): CPT

## 2023-03-31 PROCEDURE — 94760 N-INVAS EAR/PLS OXIMETRY 1: CPT

## 2023-03-31 RX ORDER — TACROLIMUS 1 MG/1
1 CAPSULE ORAL EVERY MORNING
Status: DISCONTINUED | OUTPATIENT
Start: 2023-04-01 | End: 2023-04-08 | Stop reason: HOSPADM

## 2023-03-31 RX ORDER — POTASSIUM CHLORIDE 20 MEQ/1
40 TABLET, EXTENDED RELEASE ORAL DAILY
Status: DISCONTINUED | OUTPATIENT
Start: 2023-04-01 | End: 2023-04-03

## 2023-03-31 RX ORDER — MYCOPHENOLATE MOFETIL 500 MG/1
1000 TABLET ORAL 2 TIMES DAILY
Status: DISCONTINUED | OUTPATIENT
Start: 2023-03-31 | End: 2023-03-31

## 2023-03-31 RX ORDER — AMLODIPINE BESYLATE 10 MG/1
10 TABLET ORAL DAILY
Status: DISCONTINUED | OUTPATIENT
Start: 2023-04-01 | End: 2023-04-08 | Stop reason: HOSPADM

## 2023-03-31 RX ORDER — FUROSEMIDE 10 MG/ML
40 INJECTION INTRAMUSCULAR; INTRAVENOUS
Status: DISCONTINUED | OUTPATIENT
Start: 2023-04-01 | End: 2023-04-03

## 2023-03-31 RX ORDER — AZITHROMYCIN 500 MG/5ML
500 INJECTION, POWDER, LYOPHILIZED, FOR SOLUTION INTRAVENOUS EVERY 24 HOURS
Status: DISCONTINUED | OUTPATIENT
Start: 2023-04-01 | End: 2023-04-01

## 2023-03-31 RX ORDER — HYDROMORPHONE HYDROCHLORIDE 1 MG/ML
0.5 INJECTION, SOLUTION INTRAMUSCULAR; INTRAVENOUS; SUBCUTANEOUS
Status: DISCONTINUED | OUTPATIENT
Start: 2023-03-31 | End: 2023-04-08 | Stop reason: HOSPADM

## 2023-03-31 RX ORDER — BISACODYL 10 MG
10 SUPPOSITORY, RECTAL RECTAL
Status: DISCONTINUED | OUTPATIENT
Start: 2023-03-31 | End: 2023-04-08 | Stop reason: HOSPADM

## 2023-03-31 RX ORDER — PREDNISONE 10 MG/1
5 TABLET ORAL DAILY
Status: DISCONTINUED | OUTPATIENT
Start: 2023-04-01 | End: 2023-04-08 | Stop reason: HOSPADM

## 2023-03-31 RX ORDER — METOPROLOL SUCCINATE 25 MG/1
25 TABLET, EXTENDED RELEASE ORAL DAILY
COMMUNITY
End: 2023-06-06

## 2023-03-31 RX ORDER — FUROSEMIDE 10 MG/ML
40 INJECTION INTRAMUSCULAR; INTRAVENOUS ONCE
Status: COMPLETED | OUTPATIENT
Start: 2023-04-01 | End: 2023-04-01

## 2023-03-31 RX ORDER — LOSARTAN POTASSIUM 50 MG/1
100 TABLET ORAL EVERY EVENING
Status: DISCONTINUED | OUTPATIENT
Start: 2023-03-31 | End: 2023-04-08 | Stop reason: HOSPADM

## 2023-03-31 RX ORDER — POLYETHYLENE GLYCOL 3350 17 G/17G
1 POWDER, FOR SOLUTION ORAL
Status: DISCONTINUED | OUTPATIENT
Start: 2023-03-31 | End: 2023-04-08 | Stop reason: HOSPADM

## 2023-03-31 RX ORDER — ONDANSETRON 2 MG/ML
4 INJECTION INTRAMUSCULAR; INTRAVENOUS EVERY 4 HOURS PRN
Status: DISCONTINUED | OUTPATIENT
Start: 2023-03-31 | End: 2023-04-08 | Stop reason: HOSPADM

## 2023-03-31 RX ORDER — GABAPENTIN 100 MG/1
100 CAPSULE ORAL EVERY EVENING
Status: DISCONTINUED | OUTPATIENT
Start: 2023-03-31 | End: 2023-03-31

## 2023-03-31 RX ORDER — ACETAMINOPHEN 325 MG/1
650 TABLET ORAL ONCE
Status: COMPLETED | OUTPATIENT
Start: 2023-03-31 | End: 2023-03-31

## 2023-03-31 RX ORDER — METOPROLOL SUCCINATE 25 MG/1
25 TABLET, EXTENDED RELEASE ORAL DAILY
Status: DISCONTINUED | OUTPATIENT
Start: 2023-04-01 | End: 2023-04-08 | Stop reason: HOSPADM

## 2023-03-31 RX ORDER — OXYCODONE HYDROCHLORIDE 5 MG/1
5 TABLET ORAL EVERY 4 HOURS PRN
Status: DISCONTINUED | OUTPATIENT
Start: 2023-03-31 | End: 2023-04-08 | Stop reason: HOSPADM

## 2023-03-31 RX ORDER — IPRATROPIUM BROMIDE AND ALBUTEROL SULFATE 2.5; .5 MG/3ML; MG/3ML
3 SOLUTION RESPIRATORY (INHALATION) ONCE
Status: COMPLETED | OUTPATIENT
Start: 2023-04-01 | End: 2023-04-01

## 2023-03-31 RX ORDER — ONDANSETRON 4 MG/1
4 TABLET, ORALLY DISINTEGRATING ORAL EVERY 4 HOURS PRN
Status: DISCONTINUED | OUTPATIENT
Start: 2023-03-31 | End: 2023-04-08 | Stop reason: HOSPADM

## 2023-03-31 RX ORDER — NITROGLYCERIN 0.4 MG/1
0.4 TABLET SUBLINGUAL ONCE
Status: COMPLETED | OUTPATIENT
Start: 2023-03-31 | End: 2023-03-31

## 2023-03-31 RX ORDER — SODIUM BICARBONATE 650 MG/1
650 TABLET ORAL 3 TIMES DAILY
Status: DISCONTINUED | OUTPATIENT
Start: 2023-04-01 | End: 2023-04-01

## 2023-03-31 RX ORDER — SODIUM CHLORIDE, SODIUM LACTATE, POTASSIUM CHLORIDE, AND CALCIUM CHLORIDE .6; .31; .03; .02 G/100ML; G/100ML; G/100ML; G/100ML
30 INJECTION, SOLUTION INTRAVENOUS ONCE
Status: COMPLETED | OUTPATIENT
Start: 2023-03-31 | End: 2023-03-31

## 2023-03-31 RX ORDER — MORPHINE SULFATE 4 MG/ML
2 INJECTION INTRAVENOUS ONCE
Status: COMPLETED | OUTPATIENT
Start: 2023-03-31 | End: 2023-03-31

## 2023-03-31 RX ORDER — AZITHROMYCIN 250 MG/1
500 TABLET, FILM COATED ORAL ONCE
Status: COMPLETED | OUTPATIENT
Start: 2023-03-31 | End: 2023-03-31

## 2023-03-31 RX ORDER — ACETAMINOPHEN 325 MG/1
650 TABLET ORAL EVERY 6 HOURS PRN
Status: DISCONTINUED | OUTPATIENT
Start: 2023-04-01 | End: 2023-04-08 | Stop reason: HOSPADM

## 2023-03-31 RX ORDER — AMOXICILLIN 250 MG
2 CAPSULE ORAL 2 TIMES DAILY
Status: DISCONTINUED | OUTPATIENT
Start: 2023-03-31 | End: 2023-04-08 | Stop reason: HOSPADM

## 2023-03-31 RX ORDER — FUROSEMIDE 40 MG/1
40 TABLET ORAL ONCE
Status: COMPLETED | OUTPATIENT
Start: 2023-03-31 | End: 2023-03-31

## 2023-03-31 RX ORDER — ATORVASTATIN CALCIUM 20 MG/1
20 TABLET, FILM COATED ORAL NIGHTLY
Status: DISCONTINUED | OUTPATIENT
Start: 2023-03-31 | End: 2023-04-08 | Stop reason: HOSPADM

## 2023-03-31 RX ORDER — GUAIFENESIN/DEXTROMETHORPHAN 100-10MG/5
10 SYRUP ORAL EVERY 6 HOURS PRN
Status: DISCONTINUED | OUTPATIENT
Start: 2023-03-31 | End: 2023-04-08 | Stop reason: HOSPADM

## 2023-03-31 RX ORDER — LABETALOL HYDROCHLORIDE 5 MG/ML
10 INJECTION, SOLUTION INTRAVENOUS EVERY 4 HOURS PRN
Status: DISCONTINUED | OUTPATIENT
Start: 2023-03-31 | End: 2023-04-08 | Stop reason: HOSPADM

## 2023-03-31 RX ORDER — TACROLIMUS 1 MG/1
2 CAPSULE ORAL EVERY EVENING
Status: DISCONTINUED | OUTPATIENT
Start: 2023-03-31 | End: 2023-04-08 | Stop reason: HOSPADM

## 2023-03-31 RX ORDER — SODIUM BICARBONATE 650 MG/1
1300 TABLET ORAL 3 TIMES DAILY
Status: DISCONTINUED | OUTPATIENT
Start: 2023-03-31 | End: 2023-03-31

## 2023-03-31 RX ADMIN — AMPICILLIN AND SULBACTAM 3 G: 1; 2 INJECTION, POWDER, FOR SOLUTION INTRAMUSCULAR; INTRAVENOUS at 18:15

## 2023-03-31 RX ADMIN — TACROLIMUS 2 MG: 1 CAPSULE ORAL at 23:07

## 2023-03-31 RX ADMIN — FUROSEMIDE 40 MG: 40 TABLET ORAL at 20:30

## 2023-03-31 RX ADMIN — NITROGLYCERIN 0.4 MG: 0.4 TABLET, ORALLY DISINTEGRATING SUBLINGUAL at 21:20

## 2023-03-31 RX ADMIN — APIXABAN 5 MG: 5 TABLET, FILM COATED ORAL at 23:08

## 2023-03-31 RX ADMIN — LOSARTAN POTASSIUM 100 MG: 50 TABLET, FILM COATED ORAL at 23:08

## 2023-03-31 RX ADMIN — SODIUM CHLORIDE, POTASSIUM CHLORIDE, SODIUM LACTATE AND CALCIUM CHLORIDE 1000 ML: 600; 310; 30; 20 INJECTION, SOLUTION INTRAVENOUS at 20:01

## 2023-03-31 RX ADMIN — ACETAMINOPHEN 650 MG: 325 TABLET, FILM COATED ORAL at 19:19

## 2023-03-31 RX ADMIN — MORPHINE SULFATE 2 MG: 4 INJECTION INTRAVENOUS at 18:14

## 2023-03-31 RX ADMIN — AZITHROMYCIN MONOHYDRATE 500 MG: 250 TABLET ORAL at 18:15

## 2023-03-31 RX ADMIN — ONDANSETRON HYDROCHLORIDE 4 MG: 2 SOLUTION INTRAMUSCULAR; INTRAVENOUS at 20:13

## 2023-03-31 RX ADMIN — ATORVASTATIN CALCIUM 20 MG: 20 TABLET, FILM COATED ORAL at 23:08

## 2023-03-31 ASSESSMENT — LIFESTYLE VARIABLES
AVERAGE NUMBER OF DAYS PER WEEK YOU HAVE A DRINK CONTAINING ALCOHOL: 0
EVER HAD A DRINK FIRST THING IN THE MORNING TO STEADY YOUR NERVES TO GET RID OF A HANGOVER: NO
ON A TYPICAL DAY WHEN YOU DRINK ALCOHOL HOW MANY DRINKS DO YOU HAVE: 0
EVER FELT BAD OR GUILTY ABOUT YOUR DRINKING: NO
TOTAL SCORE: 0
HAVE YOU EVER FELT YOU SHOULD CUT DOWN ON YOUR DRINKING: NO
DOES PATIENT WANT TO STOP DRINKING: NO
TOTAL SCORE: 0
HAVE PEOPLE ANNOYED YOU BY CRITICIZING YOUR DRINKING: NO
HOW MANY TIMES IN THE PAST YEAR HAVE YOU HAD 5 OR MORE DRINKS IN A DAY: 0
CONSUMPTION TOTAL: NEGATIVE
TOTAL SCORE: 0
ALCOHOL_USE: NO
SUBSTANCE_ABUSE: 0

## 2023-03-31 ASSESSMENT — ENCOUNTER SYMPTOMS
TREMORS: 0
HALLUCINATIONS: 0
WHEEZING: 0
PHOTOPHOBIA: 0
NERVOUS/ANXIOUS: 0
HEMOPTYSIS: 0
BLURRED VISION: 0
CHILLS: 1
HEADACHES: 0
BRUISES/BLEEDS EASILY: 0
FEVER: 0
ORTHOPNEA: 0
HEARTBURN: 0
POLYDIPSIA: 0
SPEECH CHANGE: 0
SPUTUM PRODUCTION: 0
PALPITATIONS: 0
BACK PAIN: 0
NAUSEA: 0
COUGH: 1
FOCAL WEAKNESS: 0
FLANK PAIN: 0
DOUBLE VISION: 0
WEIGHT LOSS: 0
NECK PAIN: 0
SHORTNESS OF BREATH: 1
VOMITING: 0

## 2023-03-31 ASSESSMENT — COGNITIVE AND FUNCTIONAL STATUS - GENERAL
WALKING IN HOSPITAL ROOM: A LITTLE
STANDING UP FROM CHAIR USING ARMS: A LITTLE
HELP NEEDED FOR BATHING: A LITTLE
CLIMB 3 TO 5 STEPS WITH RAILING: A LITTLE
TOILETING: A LITTLE
MOVING TO AND FROM BED TO CHAIR: A LITTLE
DRESSING REGULAR LOWER BODY CLOTHING: A LITTLE
PERSONAL GROOMING: A LITTLE
DRESSING REGULAR UPPER BODY CLOTHING: A LITTLE
TURNING FROM BACK TO SIDE WHILE IN FLAT BAD: A LITTLE
MOVING FROM LYING ON BACK TO SITTING ON SIDE OF FLAT BED: A LITTLE
EATING MEALS: A LITTLE
SUGGESTED CMS G CODE MODIFIER MOBILITY: CK
MOBILITY SCORE: 18
DAILY ACTIVITIY SCORE: 18
SUGGESTED CMS G CODE MODIFIER DAILY ACTIVITY: CK

## 2023-03-31 ASSESSMENT — FIBROSIS 4 INDEX
FIB4 SCORE: 3.34
FIB4 SCORE: 3

## 2023-03-31 ASSESSMENT — PAIN DESCRIPTION - PAIN TYPE: TYPE: ACUTE PAIN

## 2023-03-31 NOTE — ED TRIAGE NOTES
Ambulatory to triage with report of  Chest Pain (With radiation to RUE and associated SOB.  Onset of symptoms at 1500 today.  SOB onset last night.  Pt noted to be shivering, denies feeling cold.)  Pt drowsy in triage.  Tachypnic, shivering.  Pt S/P kidney transplant 10/2022.

## 2023-04-01 ENCOUNTER — APPOINTMENT (OUTPATIENT)
Dept: RADIOLOGY | Facility: MEDICAL CENTER | Age: 73
DRG: 193 | End: 2023-04-01
Attending: HOSPITALIST
Payer: MEDICARE

## 2023-04-01 PROBLEM — I48.21 PERMANENT ATRIAL FIBRILLATION (HCC): Status: ACTIVE | Noted: 2023-04-01

## 2023-04-01 LAB
ALBUMIN SERPL BCP-MCNC: 4 G/DL (ref 3.2–4.9)
ALBUMIN/GLOB SERPL: 1.5 G/DL
ALP SERPL-CCNC: 75 U/L (ref 30–99)
ALT SERPL-CCNC: 21 U/L (ref 2–50)
ANION GAP SERPL CALC-SCNC: 15 MMOL/L (ref 7–16)
APPEARANCE UR: CLEAR
AST SERPL-CCNC: 19 U/L (ref 12–45)
BACTERIA #/AREA URNS HPF: NEGATIVE /HPF
BASE EXCESS BLDV CALC-SCNC: -5 MMOL/L
BASOPHILS # BLD AUTO: 0.1 % (ref 0–1.8)
BASOPHILS # BLD: 0.01 K/UL (ref 0–0.12)
BILIRUB SERPL-MCNC: 0.9 MG/DL (ref 0.1–1.5)
BILIRUB UR QL STRIP.AUTO: NEGATIVE
BODY TEMPERATURE: 36.9 CENTIGRADE
BUN SERPL-MCNC: 22 MG/DL (ref 8–22)
CALCIUM ALBUM COR SERPL-MCNC: 9.1 MG/DL (ref 8.5–10.5)
CALCIUM SERPL-MCNC: 9.1 MG/DL (ref 8.5–10.5)
CHLORIDE SERPL-SCNC: 107 MMOL/L (ref 96–112)
CO2 SERPL-SCNC: 16 MMOL/L (ref 20–33)
COLOR UR: YELLOW
CREAT SERPL-MCNC: 1.51 MG/DL (ref 0.5–1.4)
EKG IMPRESSION: NORMAL
EKG IMPRESSION: NORMAL
EOSINOPHIL # BLD AUTO: 0 K/UL (ref 0–0.51)
EOSINOPHIL NFR BLD: 0 % (ref 0–6.9)
EPI CELLS #/AREA URNS HPF: ABNORMAL /HPF
ERYTHROCYTE [DISTWIDTH] IN BLOOD BY AUTOMATED COUNT: 44 FL (ref 35.9–50)
GFR SERPLBLD CREATININE-BSD FMLA CKD-EPI: 36 ML/MIN/1.73 M 2
GLOBULIN SER CALC-MCNC: 2.6 G/DL (ref 1.9–3.5)
GLUCOSE BLD STRIP.AUTO-MCNC: 106 MG/DL (ref 65–99)
GLUCOSE BLD STRIP.AUTO-MCNC: 191 MG/DL (ref 65–99)
GLUCOSE BLD STRIP.AUTO-MCNC: 194 MG/DL (ref 65–99)
GLUCOSE SERPL-MCNC: 165 MG/DL (ref 65–99)
GLUCOSE UR STRIP.AUTO-MCNC: NEGATIVE MG/DL
GRAM STN SPEC: NORMAL
HCO3 BLDV-SCNC: 19 MMOL/L (ref 24–28)
HCT VFR BLD AUTO: 30.9 % (ref 37–47)
HGB BLD-MCNC: 9.6 G/DL (ref 12–16)
HYALINE CASTS #/AREA URNS LPF: ABNORMAL /LPF
IMM GRANULOCYTES # BLD AUTO: 0.14 K/UL (ref 0–0.11)
IMM GRANULOCYTES NFR BLD AUTO: 2 % (ref 0–0.9)
KETONES UR STRIP.AUTO-MCNC: NEGATIVE MG/DL
LEUKOCYTE ESTERASE UR QL STRIP.AUTO: ABNORMAL
LYMPHOCYTES # BLD AUTO: 0.59 K/UL (ref 1–4.8)
LYMPHOCYTES NFR BLD: 8.3 % (ref 22–41)
MAGNESIUM SERPL-MCNC: 1.4 MG/DL (ref 1.5–2.5)
MCH RBC QN AUTO: 28.7 PG (ref 27–33)
MCHC RBC AUTO-ENTMCNC: 31.1 G/DL (ref 33.6–35)
MCV RBC AUTO: 92.2 FL (ref 81.4–97.8)
MICRO URNS: ABNORMAL
MONOCYTES # BLD AUTO: 0.43 K/UL (ref 0–0.85)
MONOCYTES NFR BLD AUTO: 6 % (ref 0–13.4)
NEUTROPHILS # BLD AUTO: 5.96 K/UL (ref 2–7.15)
NEUTROPHILS NFR BLD: 83.6 % (ref 44–72)
NITRITE UR QL STRIP.AUTO: NEGATIVE
NRBC # BLD AUTO: 0 K/UL
NRBC BLD-RTO: 0 /100 WBC
NT-PROBNP SERPL IA-MCNC: 3985 PG/ML (ref 0–125)
PCO2 BLDV: 31.7 MMHG (ref 41–51)
PCO2 TEMP ADJ BLDV: 31.6 MMHG (ref 41–51)
PH BLDV: 7.4 [PH] (ref 7.31–7.45)
PH TEMP ADJ BLDV: 7.4 [PH] (ref 7.31–7.45)
PH UR STRIP.AUTO: 7.5 [PH] (ref 5–8)
PLATELET # BLD AUTO: 92 K/UL (ref 164–446)
PMV BLD AUTO: 12.1 FL (ref 9–12.9)
PO2 BLDV: 43.8 MMHG (ref 25–40)
PO2 TEMP ADJ BLDV: 43.5 MMHG (ref 25–40)
POTASSIUM SERPL-SCNC: 4.4 MMOL/L (ref 3.6–5.5)
PROT SERPL-MCNC: 6.6 G/DL (ref 6–8.2)
PROT UR QL STRIP: NEGATIVE MG/DL
RBC # BLD AUTO: 3.35 M/UL (ref 4.2–5.4)
RBC # URNS HPF: ABNORMAL /HPF
RBC UR QL AUTO: ABNORMAL
SAO2 % BLDV: 77.4 %
SIGNIFICANT IND 70042: NORMAL
SITE SITE: NORMAL
SODIUM SERPL-SCNC: 138 MMOL/L (ref 135–145)
SOURCE SOURCE: NORMAL
SP GR UR STRIP.AUTO: 1.01
TACROLIMUS BLD-MCNC: 2.2 NG/ML
TROPONIN T SERPL-MCNC: 37 NG/L (ref 6–19)
UROBILINOGEN UR STRIP.AUTO-MCNC: 0.2 MG/DL
WBC # BLD AUTO: 7.1 K/UL (ref 4.8–10.8)
WBC #/AREA URNS HPF: ABNORMAL /HPF

## 2023-04-01 PROCEDURE — 93010 ELECTROCARDIOGRAM REPORT: CPT | Performed by: INTERNAL MEDICINE

## 2023-04-01 PROCEDURE — 93005 ELECTROCARDIOGRAM TRACING: CPT | Performed by: INTERNAL MEDICINE

## 2023-04-01 PROCEDURE — 700111 HCHG RX REV CODE 636 W/ 250 OVERRIDE (IP): Performed by: INTERNAL MEDICINE

## 2023-04-01 PROCEDURE — 94640 AIRWAY INHALATION TREATMENT: CPT

## 2023-04-01 PROCEDURE — A9270 NON-COVERED ITEM OR SERVICE: HCPCS | Performed by: INTERNAL MEDICINE

## 2023-04-01 PROCEDURE — 87205 SMEAR GRAM STAIN: CPT

## 2023-04-01 PROCEDURE — A9270 NON-COVERED ITEM OR SERVICE: HCPCS | Performed by: HOSPITALIST

## 2023-04-01 PROCEDURE — 700102 HCHG RX REV CODE 250 W/ 637 OVERRIDE(OP): Performed by: HOSPITALIST

## 2023-04-01 PROCEDURE — 99291 CRITICAL CARE FIRST HOUR: CPT | Performed by: HOSPITALIST

## 2023-04-01 PROCEDURE — 86645 CMV ANTIBODY IGM: CPT

## 2023-04-01 PROCEDURE — 83880 ASSAY OF NATRIURETIC PEPTIDE: CPT

## 2023-04-01 PROCEDURE — 82962 GLUCOSE BLOOD TEST: CPT | Mod: 91

## 2023-04-01 PROCEDURE — 71045 X-RAY EXAM CHEST 1 VIEW: CPT

## 2023-04-01 PROCEDURE — 700102 HCHG RX REV CODE 250 W/ 637 OVERRIDE(OP): Performed by: INTERNAL MEDICINE

## 2023-04-01 PROCEDURE — 85025 COMPLETE CBC W/AUTO DIFF WBC: CPT

## 2023-04-01 PROCEDURE — 86644 CMV ANTIBODY: CPT

## 2023-04-01 PROCEDURE — 80053 COMPREHEN METABOLIC PANEL: CPT

## 2023-04-01 PROCEDURE — 94760 N-INVAS EAR/PLS OXIMETRY 1: CPT

## 2023-04-01 PROCEDURE — 81001 URINALYSIS AUTO W/SCOPE: CPT

## 2023-04-01 PROCEDURE — 87899 AGENT NOS ASSAY W/OPTIC: CPT

## 2023-04-01 PROCEDURE — 84484 ASSAY OF TROPONIN QUANT: CPT

## 2023-04-01 PROCEDURE — 87449 NOS EACH ORGANISM AG IA: CPT

## 2023-04-01 PROCEDURE — 700101 HCHG RX REV CODE 250: Performed by: INTERNAL MEDICINE

## 2023-04-01 PROCEDURE — 770000 HCHG ROOM/CARE - INTERMEDIATE ICU *

## 2023-04-01 PROCEDURE — 700105 HCHG RX REV CODE 258: Performed by: INTERNAL MEDICINE

## 2023-04-01 PROCEDURE — 93010 ELECTROCARDIOGRAM REPORT: CPT | Mod: 76 | Performed by: INTERNAL MEDICINE

## 2023-04-01 PROCEDURE — 99222 1ST HOSP IP/OBS MODERATE 55: CPT | Performed by: INTERNAL MEDICINE

## 2023-04-01 PROCEDURE — 82803 BLOOD GASES ANY COMBINATION: CPT

## 2023-04-01 PROCEDURE — 87086 URINE CULTURE/COLONY COUNT: CPT

## 2023-04-01 PROCEDURE — 83735 ASSAY OF MAGNESIUM: CPT

## 2023-04-01 RX ORDER — SODIUM BICARBONATE 650 MG/1
1300 TABLET ORAL 3 TIMES DAILY
Status: DISCONTINUED | OUTPATIENT
Start: 2023-04-01 | End: 2023-04-08 | Stop reason: HOSPADM

## 2023-04-01 RX ORDER — NITROGLYCERIN 20 MG/100ML
0-200 INJECTION INTRAVENOUS CONTINUOUS
Status: DISCONTINUED | OUTPATIENT
Start: 2023-04-01 | End: 2023-04-02

## 2023-04-01 RX ORDER — HYDROMORPHONE HYDROCHLORIDE 1 MG/ML
0.5 INJECTION, SOLUTION INTRAMUSCULAR; INTRAVENOUS; SUBCUTANEOUS ONCE
Status: ACTIVE | OUTPATIENT
Start: 2023-04-01 | End: 2023-04-02

## 2023-04-01 RX ORDER — AZITHROMYCIN 250 MG/1
500 TABLET, FILM COATED ORAL EVERY EVENING
Status: COMPLETED | OUTPATIENT
Start: 2023-04-01 | End: 2023-04-02

## 2023-04-01 RX ADMIN — ACETAMINOPHEN 650 MG: 325 TABLET, FILM COATED ORAL at 20:07

## 2023-04-01 RX ADMIN — TACROLIMUS 2 MG: 1 CAPSULE ORAL at 20:06

## 2023-04-01 RX ADMIN — APIXABAN 5 MG: 5 TABLET, FILM COATED ORAL at 18:03

## 2023-04-01 RX ADMIN — METOPROLOL SUCCINATE 25 MG: 25 TABLET, EXTENDED RELEASE ORAL at 06:03

## 2023-04-01 RX ADMIN — TACROLIMUS 1 MG: 1 CAPSULE ORAL at 06:04

## 2023-04-01 RX ADMIN — SODIUM BICARBONATE 650 MG: 650 TABLET ORAL at 09:18

## 2023-04-01 RX ADMIN — ONDANSETRON HYDROCHLORIDE 4 MG: 2 SOLUTION INTRAMUSCULAR; INTRAVENOUS at 20:06

## 2023-04-01 RX ADMIN — FUROSEMIDE 40 MG: 10 INJECTION, SOLUTION INTRAMUSCULAR; INTRAVENOUS at 06:03

## 2023-04-01 RX ADMIN — NITROGLYCERIN 10 MCG/MIN: 20 INJECTION INTRAVENOUS at 01:41

## 2023-04-01 RX ADMIN — FUROSEMIDE 40 MG: 10 INJECTION, SOLUTION INTRAMUSCULAR; INTRAVENOUS at 15:55

## 2023-04-01 RX ADMIN — SODIUM BICARBONATE 1300 MG: 650 TABLET ORAL at 20:07

## 2023-04-01 RX ADMIN — ATORVASTATIN CALCIUM 20 MG: 20 TABLET, FILM COATED ORAL at 20:07

## 2023-04-01 RX ADMIN — SODIUM BICARBONATE 650 MG: 650 TABLET ORAL at 15:55

## 2023-04-01 RX ADMIN — HYDROMORPHONE HYDROCHLORIDE 0.5 MG: 1 INJECTION, SOLUTION INTRAMUSCULAR; INTRAVENOUS; SUBCUTANEOUS at 00:12

## 2023-04-01 RX ADMIN — IPRATROPIUM BROMIDE AND ALBUTEROL SULFATE 3 ML: .5; 2.5 SOLUTION RESPIRATORY (INHALATION) at 00:03

## 2023-04-01 RX ADMIN — INSULIN HUMAN 1 UNITS: 100 INJECTION, SOLUTION PARENTERAL at 20:07

## 2023-04-01 RX ADMIN — FUROSEMIDE 40 MG: 10 INJECTION, SOLUTION INTRAMUSCULAR; INTRAVENOUS at 00:00

## 2023-04-01 RX ADMIN — PREDNISONE 5 MG: 10 TABLET ORAL at 06:03

## 2023-04-01 RX ADMIN — NITROGLYCERIN 0.5 INCH: 20 OINTMENT TOPICAL at 00:03

## 2023-04-01 RX ADMIN — LOSARTAN POTASSIUM 100 MG: 50 TABLET, FILM COATED ORAL at 18:04

## 2023-04-01 RX ADMIN — CEFEPIME 1 G: 1 INJECTION, POWDER, FOR SOLUTION INTRAMUSCULAR; INTRAVENOUS at 00:02

## 2023-04-01 RX ADMIN — POTASSIUM CHLORIDE 40 MEQ: 1500 TABLET, EXTENDED RELEASE ORAL at 06:03

## 2023-04-01 RX ADMIN — ACETAMINOPHEN 650 MG: 325 TABLET, FILM COATED ORAL at 01:42

## 2023-04-01 RX ADMIN — AZITHROMYCIN MONOHYDRATE 500 MG: 250 TABLET ORAL at 18:03

## 2023-04-01 RX ADMIN — SODIUM BICARBONATE 650 MG: 650 TABLET ORAL at 01:42

## 2023-04-01 RX ADMIN — DOCUSATE SODIUM 50 MG AND SENNOSIDES 8.6 MG 2 TABLET: 8.6; 5 TABLET, FILM COATED ORAL at 18:03

## 2023-04-01 RX ADMIN — APIXABAN 5 MG: 5 TABLET, FILM COATED ORAL at 06:03

## 2023-04-01 RX ADMIN — DOCUSATE SODIUM 50 MG AND SENNOSIDES 8.6 MG 2 TABLET: 8.6; 5 TABLET, FILM COATED ORAL at 06:04

## 2023-04-01 RX ADMIN — AMLODIPINE BESYLATE 10 MG: 10 TABLET ORAL at 06:03

## 2023-04-01 RX ADMIN — INSULIN HUMAN 1 UNITS: 100 INJECTION, SOLUTION PARENTERAL at 12:20

## 2023-04-01 RX ADMIN — INSULIN HUMAN 1 UNITS: 100 INJECTION, SOLUTION PARENTERAL at 18:05

## 2023-04-01 ASSESSMENT — COGNITIVE AND FUNCTIONAL STATUS - GENERAL
MOBILITY SCORE: 20
HELP NEEDED FOR BATHING: A LITTLE
MOVING TO AND FROM BED TO CHAIR: A LITTLE
SUGGESTED CMS G CODE MODIFIER DAILY ACTIVITY: CJ
SUGGESTED CMS G CODE MODIFIER MOBILITY: CJ
CLIMB 3 TO 5 STEPS WITH RAILING: A LITTLE
STANDING UP FROM CHAIR USING ARMS: A LITTLE
TOILETING: A LITTLE
DRESSING REGULAR LOWER BODY CLOTHING: A LITTLE
DAILY ACTIVITIY SCORE: 21
MOVING FROM LYING ON BACK TO SITTING ON SIDE OF FLAT BED: A LITTLE

## 2023-04-01 ASSESSMENT — ENCOUNTER SYMPTOMS
FEVER: 1
NERVOUS/ANXIOUS: 1
PALPITATIONS: 0
FOCAL WEAKNESS: 0
NEUROLOGICAL NEGATIVE: 1
POLYDIPSIA: 0
CHILLS: 1
NECK PAIN: 0
SHORTNESS OF BREATH: 1
COUGH: 1
SPUTUM PRODUCTION: 1
MUSCULOSKELETAL NEGATIVE: 1
GASTROINTESTINAL NEGATIVE: 1
EYES NEGATIVE: 1
NAUSEA: 0
BRUISES/BLEEDS EASILY: 0
VOMITING: 0
DIZZINESS: 0
DEPRESSION: 0
HEARTBURN: 0
WEAKNESS: 0
BACK PAIN: 0
HEADACHES: 0
CARDIOVASCULAR NEGATIVE: 1

## 2023-04-01 ASSESSMENT — PAIN DESCRIPTION - PAIN TYPE
TYPE: ACUTE PAIN
TYPE: CHRONIC PAIN
TYPE: ACUTE PAIN

## 2023-04-01 ASSESSMENT — FIBROSIS 4 INDEX: FIB4 SCORE: 3.29

## 2023-04-01 NOTE — PROGRESS NOTES
I was informed that oxygen requirement went up to 15 L nonrebreather.  When I saw the patient she was dyspneic, with respiratory rate around 30/min and there is a question if she has any pain, she stated that she feels tired to breathe.  Subsequently patient was started on high flow mask.  Patient remains mildly hypertensive, with blood pressure 160s over 90s.  I personally reviewed chest x-ray imaging and in my opinion it showed worsening opacification in the right lung and to lesser degree in the left lung.  Repeat EKG did not significantly changed. repeat troponin is pending.  VBG is pending.  Out of concern for pulmonary edema I ordered nitroglycerin drip, IV Dilaudid 0.5 mg once.  Initiated transfer to [facility] in agreement with Dr. Saldaña/critical care.  Patient is critically ill.   The patient continues to have: Acute respiratory failure, pulmonary edema and pneumonia  The vital organ system that is affected is the: Respiratory  If untreated there is a high chance of deterioration into: Cardiopulmonary arrest  And eventually death.   The critical care that I am providing today is:   Frequent reevaluation at bedside  Review of diagnostic studies: EKG, chest x-ray  Assessment of treatment effect of nitroglycerin paste, IV Lasix, IV Dilantin  Coordination of care with the rapid response team.  The critical that has been undertaken is medically complex.   There has been no overlap in critical care time.   Critical Care Time not including procedures: 45 min

## 2023-04-01 NOTE — ED NOTES
Report from LALY RN. Rounded on pt. Pt resting comfortably in bed at this time. On vitals monitor. Respirations equal and unlabored. Vitals signs stable. No acute distress at this time. Call light within reach.

## 2023-04-01 NOTE — ASSESSMENT & PLAN NOTE
Probably angina secondary to uncontrolled blood pressure.  Patient denies worsening of chest pain on deep inspiration or with cough.  EKG has not changed, troponin is in indeterminate range 27.  Patient is on anticoagulation with apixaban.  Continue current volume control, blood pressure control  Monitor on telemetry, repeat troponin  TTE with preserved LVEF, no significant valvular or structural anomalies and no wall motion abnormalities

## 2023-04-01 NOTE — PROGRESS NOTES
At 2330 provider was notified regaurding worsening SOB, and increase in o2 demand. At 2345 RT saw pt and placed highflow. Provider saw pt at bedside and ordered labs, CXR, EKG, amd , medications. Charge Rn was notified, RRT was notified. The intensivist saw pt at bedside and placed transfer order. Report given on phone to Suyapa ROBLES at 0109. RRT transferred pt

## 2023-04-01 NOTE — ED NOTES
Med rec partially complete per patient's son, Manoj    Patient's son states there are a few new medications that she started recently but he did not have the names of them. He is going to bring them in tomorrow (4/1/23) to be updated    Per patient's son the transplant nurse told him to hold the mycophenolate a few days ago.     Preferred pharmacy: [facility] [address]

## 2023-04-01 NOTE — ASSESSMENT & PLAN NOTE
Apparently recent development, has a monitor on currently, has been followed by cardiology  Beta-blockade for rate control  Resume anticoagulation with apixaban: dose adjust 2.5mg

## 2023-04-01 NOTE — PROGRESS NOTES
Pt arrived to unit via stretcher at 0100. Pt oriented to room, unit, and plan of care. Tele-monitor placed and monitor room notified. All questions answered at this time. Call light within reach; fall precautions in place. Pt declining chest pain. Will monitor closely.     0130 Dr. Ortiz notified of /82, Temp 101.2F. Orders clarified to start nitroglycerin drip. Tylenol given for temp. Pt denying chest pain. On Heated high flow NC at 30L 70% satting 93%.     0200 Han placed by this RN per orders.

## 2023-04-01 NOTE — ED NOTES
Pt transferred out of ED at this time with Regino ROBLES . Pt is A&Ox4, with stable vital signs and no apparent distress upon transfer. Pt transferred on tele monitor and 6L O2 with adequate O2 volume in bed tank. All paperwork and personal belongings sent with pt.

## 2023-04-01 NOTE — CARE PLAN
Problem: Humidified High Flow Nasal Cannula  Goal: Maintain adequate oxygenation dependent on patient condition  Description: Target End Date:  resolve prior to discharge or when underlying condition is resolved/stabilized    1.  Implement humidified high flow oxygen therapy  2.  Titrate high flow oxygen to maintain appropriate SpO2  Outcome: Not Met     HHFNC 35L @ 70%    Problem: Hyperinflation  Goal: Prevent or improve atelectasis  Description: Target End Date:  3 to 4 days    1. Instruct incentive spirometry usage  2.  Perform hyperinflation therapy as indicated  Outcome: Not Met     PEP QID

## 2023-04-01 NOTE — ED NOTES
Unable to complete med rec at this time. Family was able to provide pictures of medications, but they weren't quite sure if pt was taking as prescribed since pt's son (currently working) manages medications.

## 2023-04-01 NOTE — PROGRESS NOTES
Hospital Medicine Daily Progress Note    Date of Service  4/1/2023    Chief Complaint  Tere Gallegos is a 72 y.o. female admitted 3/31/2023 with dyspnea, fevers, shivering, chest pain    Hospital Course  Tere Gallegos is a 72 y.o. female with past medical history of kidney transplant in October 2022, CAD, NOEMI to RCA, paroxysmal A-fib, anticoagulation with Eliquis, renovascular hypertension, hypothyroidism, heart failure with preserved ejection fraction, type 2 diabetes, hypertension, restless leg syndrome, who presented 3/31/2023 with complaints of shortness of breath and chest pain.  Patient is experiencing shortness of breath at rest and on exertion since last night.  Today she started having dry cough, chills, subjective fever.  At 3 PM she started having constant dull sensation in the middle of the chest, radiating to the right shoulder.  In ER patient noted to be mildly hypertensive 189/79, desaturated to 87% on room air, was placed on 2 L nasal cannula, then on 4 and 6 L subsequently.  COVID-19/influenza/RSV screen is negative.  Blood work did not show any significant worsening of kidney function, but did show mild acidosis with bicarb of 18.  Chest x-ray showed pulmonary edema and patchy airspace opacities, which is consistent with pneumonia and/or pulmonary edema.  Lactic acid is not elevated.  Troponin is 26.  EKG showed sinus rhythm, heart rate 86, chronic T wave inversion in lateral leads.  The patient on telemetry found in atrial fibrillation, flutter, rate controlled    Interval Problem Update  Patient seen and examined today.  Data, Medication data reviewed.  Case discussed with nursing as available.  Plan of Care reviewed with patient and notified of changes.   4/1 the patient currently afebrile, heart rates in his 60s, respiration slightly tachypneic but not labored, the patient remains on high flow nasal cannula, 35 L, 80% FiO2, blood pressure in the 130s over 60s, restart  nitroglycerin for blood pressure control, currently no chest pain, mild cough, laboratory data with a white cell count of 7.1, hemoglobin 9.6, hematocrit 30.9, platelet count 92, chemistry with a sodium of 133, potassium 4.4, chloride 107, bicarb 16, glucose 165, BUN 22, creatinine 1.51, magnesium 1.4, lactic acid 1.4, tacrolimus level 2.2 on 3/30, troponin in the indeterminate range, 37, BNP 3985,  The patient had kidney transplant in October at [facility] [place]  Discussed with infectious disease, discussed with nephrology    I have discussed this patient's plan of care and discharge plan at IDT rounds today with Case Management, Nursing, Nursing leadership, and other members of the IDT team.    Consultants/Specialty  critical care, infectious disease, and nephrology    Code Status  Full Code    Disposition  Patient is not medically cleared for discharge.   Anticipate discharge to to home with close outpatient follow-up.  I have placed the appropriate orders for post-discharge needs.    Review of Systems  Review of Systems   Constitutional:  Positive for chills, fever and malaise/fatigue.   HENT: Negative.     Eyes: Negative.    Respiratory:  Positive for cough, sputum production and shortness of breath.    Cardiovascular: Negative.  Negative for chest pain and palpitations.   Gastrointestinal: Negative.  Negative for heartburn, nausea and vomiting.   Genitourinary: Negative.  Negative for dysuria and frequency.   Musculoskeletal: Negative.  Negative for back pain and neck pain.   Skin: Negative.  Negative for itching and rash.   Neurological: Negative.  Negative for dizziness, focal weakness, weakness and headaches.   Endo/Heme/Allergies: Negative.  Negative for polydipsia. Does not bruise/bleed easily.   Psychiatric/Behavioral:  Negative for depression. The patient is nervous/anxious.       Physical Exam  Temp:  [36.5 °C (97.7 °F)-38.4 °C (101.2 °F)] 38.4 °C (101.2 °F)  Pulse:  [66-95] 68  Resp:  [16-30] 20  BP:  (149-205)/(53-82) 154/67  SpO2:  [87 %-96 %] 95 %    Physical Exam  Vitals and nursing note reviewed.   Constitutional:       Appearance: She is well-developed. She is ill-appearing. She is not diaphoretic.   HENT:      Head: Normocephalic and atraumatic.      Nose: Nose normal.   Eyes:      Conjunctiva/sclera: Conjunctivae normal.      Pupils: Pupils are equal, round, and reactive to light.   Neck:      Thyroid: No thyromegaly.      Vascular: No JVD.   Cardiovascular:      Rate and Rhythm: Normal rate and regular rhythm.      Heart sounds: Normal heart sounds.     No friction rub. No gallop.   Pulmonary:      Effort: Pulmonary effort is normal.      Breath sounds: Rhonchi and rales present. No wheezing.   Abdominal:      General: Bowel sounds are normal. There is no distension.      Palpations: Abdomen is soft. There is no mass.      Tenderness: There is no abdominal tenderness. There is no guarding or rebound.   Musculoskeletal:         General: No tenderness. Normal range of motion.      Cervical back: Normal range of motion and neck supple.   Lymphadenopathy:      Cervical: No cervical adenopathy.   Skin:     General: Skin is warm and dry.   Neurological:      Mental Status: She is alert and oriented to person, place, and time.      Cranial Nerves: No cranial nerve deficit.   Psychiatric:         Behavior: Behavior normal.       Fluids    Intake/Output Summary (Last 24 hours) at 4/1/2023 0819  Last data filed at 4/1/2023 0635  Gross per 24 hour   Intake --   Output 970 ml   Net -970 ml       Laboratory  Recent Labs     03/30/23  0700 03/31/23  1707 04/01/23  0028   WBC 3.7* 7.3 7.1   RBC 3.44* 3.53* 3.35*   HEMOGLOBIN 9.8* 10.1* 9.6*   HEMATOCRIT 31.3* 32.2* 30.9*   MCV 91.0 91.2 92.2   MCH 28.5 28.6 28.7   MCHC 31.3* 31.4* 31.1*   RDW 41.9 42.5 44.0   PLATELETCT 106* 95* 92*   MPV 11.3 11.3 12.1     Recent Labs     03/30/23  0700 03/31/23  1707 04/01/23  0028   SODIUM 142 138 138   POTASSIUM 4.4 4.1 4.4    CHLORIDE 109 106 107   CO2 21 18* 16*   GLUCOSE 89 147* 165*   BUN 26* 24* 22   CREATININE 1.53* 1.50* 1.51*   CALCIUM 9.6 9.8 9.1                   Imaging  DX-CHEST-PORTABLE (1 VIEW)   Final Result      No significant interval change.      DX-CHEST-LIMITED (1 VIEW)   Final Result         Airspace opacities throughout the right lung, similar to prior.      DX-CHEST-PORTABLE (1 VIEW)   Final Result      1.  Moderate cardiomegaly.   2.  Central perivascular and interstitial pulmonary edema asymmetrically greater on the right with patchy airspace opacities. Superimposed pneumonia is not excluded.      EC-ECHOCARDIOGRAM COMPLETE W/O CONT    (Results Pending)   US-EXTREMITY VENOUS LOWER BILAT    (Results Pending)        Assessment/Plan  * Pneumonia due to infectious organism- (present on admission)  Assessment & Plan  Patient presumed has pulmonary infection based on subjective chills, fever, dry cough and shortness of breath  She will be treated with empiric antibiotics.    Given immunocompromise state, will choose cefepime for resistant gram-negative fluids, azithromycin for atypicals  COVID-19/influenza/RSV negative.  Obtain sputum culture if able  Infectious disease consult  The patient with positive BK virus titers, should typically not affect her lung function    Permanent atrial fibrillation (HCC)  Assessment & Plan  Apparently recent development, has a monitor on currently, has been followed by cardiology  Ongoing anticoagulation  Beta-blockade for rate control      CKD (chronic kidney disease), stage III (HCC)  Assessment & Plan  CKD stage III of transplanted kidney  Avoid nephrotoxins  Nephrology consultation  Continue immunosuppression    Hypertensive urgency  Assessment & Plan  Continue home medications amlodipine, losartan  IV Lasix for volume reduction  Nitroglycerin drip as needed  Monitor blood pressure  .    Kidney transplant recipient  Assessment & Plan  The patient appears to be at baseline with her  creatinine  Continue prednisone, tacrolimus, CellCept  Avoid nephrotoxins  Bicarb for acidosis    Acute respiratory failure with hypoxia due to volume overload, possible pneumonia (HCC)  Assessment & Plan  Patient with worsening oxygenation since admission, currently on a high flow nasal cannula oxygen, 35 L, 80%  Etiology includes pulmonary edema and community-acquired pneumonia.  Patient is on apixaban, therefore PE seems to be less likely.    Treatment for presumed possible heart failure, volume overload as well as pneumonia  Significantly hypertensive, adjusting medication regimen, volume control  Continue empiric antibiotics for pneumonia  Monitor input and output and daily weight.   Currently with acute respiratory failure, requiring high flow nasal cannula, critically ill    Chest pain- (present on admission)  Assessment & Plan  Probably angina secondary to uncontrolled blood pressure.  Patient denies worsening of chest pain on deep inspiration or with cough.  EKG has not changed, troponin is in indeterminate range 27.  Patient is on anticoagulation with apixaban.  Continue current volume control, blood pressure control  Monitor on telemetry, repeat troponin  Obtain transthoracic echo on follow-up        Acquired hypothyroidism- (present on admission)  Assessment & Plan  Continue levothyroxine    Chronic heart failure with preserved ejection fraction (HCC)- (present on admission)  Assessment & Plan  History of ejection fraction 55%, follow-up on cardiac function by echocardiogram  Continue diuresis for now  Blood pressure control    Diabetes (HCC)  Assessment & Plan  Type 2 diabetes   Will place on insulin sliding scale.  Most recent A1c 6.0.       Plan  Ongoing diuresis as tolerated, monitor in and out closely, get to dry weight  Nephrology consultation, renal transplant in 10 of/22 [place] [facility] [place]  Infectious disease consultation  Follow cultures closely, attempt sputum collection  Ongoing anticoagulation  Ongoing  rate control  Blood pressure control to optimize  Balance electrolytes closely  Continue immunosuppression  See orders  Medically complex high-risk patient  Critically ill with high oxygen requirement currently high flow nasal cannula at 35 L, 80%  Critical care time 35 minutes      VTE prophylaxis: therapeutic anticoagulation with apixaban    I have performed a physical exam and reviewed and updated ROS and Plan today (4/1/2023). In review of yesterday's note (3/31/2023), there are no changes except as documented above.      Please note that this dictation was created using voice recognition software. I have made every reasonable attempt to correct obvious errors, but I expect that there are errors of grammar and possibly context that I did not discover before finalizing the note.

## 2023-04-01 NOTE — H&P
Hospital Medicine History & Physical Note    Date of Service  3/31/2023    Primary Care Physician  MALGORZATA Concepcion.        Code Status  Full Code    Chief Complaint  Chief Complaint   Patient presents with    Chest Pain     With radiation to RUE and associated SOB.  Onset of symptoms at 1500 today.  SOB onset last night.  Pt noted to be shivering, denies feeling cold.       History of Presenting Illness  Tere Gallegos is a 72 y.o. female with past medical history of kidney transplant in October 2022, CAD, NOEMI to RCA, paroxysmal A-fib, anticoagulation with Eliquis, renovascular hypertension, hypothyroidism, heart failure with preserved ejection fraction, type 2 diabetes, hypertension, restless leg syndrome, who presented 3/31/2023 with complaints of shortness of breath and chest pain.  Patient is experiencing shortness of breath at rest and on exertion since last night.  Today she started having dry cough, chills, subjective fever.  At 3 PM she started having constant dull sensation in the middle of the chest, radiating to the right shoulder.  In ER patient noted to be mildly hypertensive 189/79, desaturated to 87% on room air, was placed on 2 L nasal cannula, then on 4 and 6 L subsequently.  COVID-19/influenza/RSV screen is negative.  Blood work did not show any significant worsening of kidney function, but did show mild acidosis with bicarb of 18.  Chest x-ray showed pulmonary edema and patchy airspace opacities, which is consistent with pneumonia and/or pulmonary edema.  Lactic acid is not elevated.  Troponin is 26.  EKG showed sinus rhythm, heart rate 86, chronic T wave inversion in lateral leads.  I discussed the plan of care with patient and family, ERP Dr. Paris    Review of Systems  Review of Systems   Constitutional:  Positive for chills and malaise/fatigue. Negative for fever and weight loss.   HENT:  Negative for ear pain, hearing loss and tinnitus.    Eyes:  Negative for blurred  vision, double vision and photophobia.   Respiratory:  Positive for cough and shortness of breath. Negative for hemoptysis, sputum production and wheezing.    Cardiovascular:  Positive for chest pain and leg swelling (Intermittent). Negative for palpitations and orthopnea.   Gastrointestinal:  Negative for heartburn, nausea and vomiting.   Genitourinary:  Negative for dysuria, flank pain, frequency and hematuria.   Musculoskeletal:  Positive for joint pain. Negative for back pain and neck pain.   Skin:  Negative for itching and rash.   Neurological:  Negative for tremors, speech change, focal weakness and headaches.   Endo/Heme/Allergies:  Negative for environmental allergies and polydipsia. Does not bruise/bleed easily.   Psychiatric/Behavioral:  Negative for hallucinations and substance abuse. The patient is not nervous/anxious.      Past Medical History   has a past medical history of Acquired hypothyroidism (05/04/2020), CAD (coronary artery disease), Chronic diastolic heart failure (HCC) (05/04/2020), Coronary artery disease due to lipid rich plaque, Dental disorder, Diabetes ([facility]), ESRD (end stage renal disease) on dialysis ([facility]) (05/04/2020), Hemodialysis patient ([facility]), Hyperlipidemia, Hypertension, Kidney transplant candidate, Kidney transplant recipient (10/31/2022), Presence of drug-eluting stent in right coronary artery, QT prolongation (01/22/2020), RLS (restless legs syndrome) (08/05/2016), and Transaminitis (12/22/2018).    Surgical History   has a past surgical history that includes recovery (08/16/2016); other abdominal surgery; other (Left, 2014); zzz cardiac cath (08/16/2016); [facility] cardiac cath (09/07/2016); and other abdominal surgery (Right, 10/31/2022).     Family History  family history includes Diabetes in her brother and sister; Other in her sister.   Family history reviewed with patient. There is no family history that is pertinent to the chief complaint.     Social History   reports that she  has never smoked. She has never used smokeless tobacco. She reports that she does not drink alcohol and does not use drugs.    Allergies  No Known Allergies    Medications  Prior to Admission Medications   Prescriptions Last Dose Informant Patient Reported? Taking?   BD PEN NEEDLE PETE 2ND GEN   Yes No   Sig: USE AS DIRECTED WITH INSULIN PENS THREE TIMES DAILY BEFORE A MEAL   Blood Glucose Monitoring Suppl (ONE TOUCH ULTRA 2) w/Device Kit   No No   Si DEVICE 3 TIMES A DAY BEFORE MEALS.   Lancets  Patient's Home Pharmacy, Family Member No No   Sig: Use one Freestyle Pearl lancet to test blood sugar once daily .   amLODIPine (NORVASC) 10 MG Tab   Yes No   Sig: Take 10 mg by mouth every day.   apixaban (ELIQUIS) 5 MG Tab tablet   No No   Sig: Take 1 Tablet by mouth 2 times a day.   atorvastatin (LIPITOR) 20 MG Tab   No No   Sig: Take 1 Tablet by mouth every evening.   docusate sodium (COLACE) 100 MG Cap   No No   Sig: Take 1-2 Capsules by mouth 2 times a day as needed for Constipation. Indications: Constipation   doxazosin (CARDURA) 1 MG Tab   No No   Sig: Take 1 Tablet by mouth every evening.   Patient not taking: Reported on 3/3/2023   fluticasone (FLONASE) 50 MCG/ACT nasal spray  Patient's Home Pharmacy, Family Member No No   Sig: ADMINISTER 1 SPRAY INTO AFFECTED NOSTRIL(S) EVERY DAY.   Patient not taking: Reported on 3/3/2023   furosemide (LASIX) 20 MG Tab   No No   Sig: Take 1 Tablet by mouth 1 time a day as needed (leg swelling).   Patient not taking: Reported on 3/3/2023   gabapentin (NEURONTIN) 100 MG Cap   No No   Sig: Take 1 Capsule by mouth every evening.   glucose blood (ONETOUCH VERIO) strip   No No   Si Strip by Other route as needed (on insulin checking 3-4 times a day).   insulin aspart (NOVOLOG FLEXPEN) 100 UNIT/ML injection PEN  Patient's Home Pharmacy, Family Member Yes No   Sig: Inject 2-8 Units under the skin 3 times a day before meals. Sliding Scale   insulin glargine (LANTUS SOLOSTAR) 100  UNIT/ML Solution Pen-injector injection   Yes No   Sig: Inject 5 Units under the skin every day.   levothyroxine (SYNTHROID) 75 MCG Tab   No No   Sig: Take 1 Tablet by mouth every morning on an empty stomach.   losartan (COZAAR) 100 MG Tab   No No   Sig: Take 1 Tablet by mouth every evening.   mycophenolate (CELLCEPT) 500 MG tablet  Patient's Home Pharmacy, Family Member Yes No   Sig: Take 2 Tablets by mouth 2 times a day.   predniSONE (DELTASONE) 5 MG Tab  Patient, Patient's Home Pharmacy, Family Member Yes No   Sig: Take 2 Tablets by mouth every day.   sodium bicarbonate (SODIUM BICARBONATE) 650 MG Tab  Patient's Home Pharmacy, Family Member No No   Sig: Take 2 Tablets by mouth in the morning, at noon, and at bedtime.   Patient not taking: Reported on 3/3/2023   tacrolimus (PROGRAF) 1 MG Cap  Patient, Patient's Home Pharmacy, Family Member Yes No   Sig: Take 1 Capsule by mouth 2 times a day.      Facility-Administered Medications: None       Physical Exam  Temp:  [37.1 °C (98.8 °F)] 37.1 °C (98.8 °F)  Pulse:  [86] 86  Resp:  [18-28] 25  BP: (176-189)/(64-79) 189/79  SpO2:  [87 %-96 %] 96 %  Blood Pressure : (!) 189/79   Temperature: 37.1 °C (98.8 °F)   Pulse: 86   Respiration: (!) 25   Pulse Oximetry: 96 %       Physical Exam  Vitals and nursing note reviewed.   Constitutional:       General: She is not in acute distress.     Appearance: Normal appearance. She is ill-appearing.   HENT:      Head: Normocephalic and atraumatic.      Nose: Nose normal.      Mouth/Throat:      Mouth: Mucous membranes are moist.   Eyes:      Extraocular Movements: Extraocular movements intact.      Pupils: Pupils are equal, round, and reactive to light.   Cardiovascular:      Rate and Rhythm: Normal rate and regular rhythm.   Pulmonary:      Effort: Pulmonary effort is normal.      Breath sounds: Examination of the right-lower field reveals decreased breath sounds. Examination of the left-lower field reveals decreased breath sounds.  Decreased breath sounds and rales present.   Abdominal:      General: Abdomen is flat. There is no distension.      Tenderness: There is no abdominal tenderness.      Comments: Surgical incision CDI in the right lower quadrant   Musculoskeletal:         General: No swelling or deformity. Normal range of motion.      Cervical back: Normal range of motion and neck supple.   Skin:     General: Skin is warm and dry.   Neurological:      General: No focal deficit present.      Mental Status: She is alert and oriented to person, place, and time.   Psychiatric:         Mood and Affect: Mood normal.         Behavior: Behavior normal.       Laboratory:  Recent Labs     03/30/23  0700 03/31/23  1707   WBC 3.7* 7.3   RBC 3.44* 3.53*   HEMOGLOBIN 9.8* 10.1*   HEMATOCRIT 31.3* 32.2*   MCV 91.0 91.2   MCH 28.5 28.6   MCHC 31.3* 31.4*   RDW 41.9 42.5   PLATELETCT 106* 95*   MPV 11.3 11.3     Recent Labs     03/30/23  0700 03/31/23  1707   SODIUM 142 138   POTASSIUM 4.4 4.1   CHLORIDE 109 106   CO2 21 18*   GLUCOSE 89 147*   BUN 26* 24*   CREATININE 1.53* 1.50*   CALCIUM 9.6 9.8     Recent Labs     03/30/23  0700 03/31/23  1707   ALTSGPT  --  29   ASTSGOT  --  21   ALKPHOSPHAT  --  87   TBILIRUBIN  --  0.8   GLUCOSE 89 147*         No results for input(s): NTPROBNP in the last 72 hours.      Recent Labs     03/31/23  1707   TROPONINT 26*       Imaging:  DX-CHEST-PORTABLE (1 VIEW)   Final Result      1.  Moderate cardiomegaly.   2.  Central perivascular and interstitial pulmonary edema asymmetrically greater on the right with patchy airspace opacities. Superimposed pneumonia is not excluded.      EC-ECHOCARDIOGRAM COMPLETE W/O CONT    (Results Pending)       X-Ray:  I have personally reviewed the images and compared with prior images.    Assessment/Plan:  Justification for Admission Status  I anticipate this patient will require at least two midnights for appropriate medical management, necessitating inpatient admission because  acute respiratory failure, pneumonia    Patient will need a Telemetry bed on MEDICAL service .  The need is secondary to acute respiratory failure, pneumonia.    * Pneumonia due to infectious organism- (present on admission)  Assessment & Plan  Patient presumed has pulmonary infection based on subjective chills, fever, dry cough and shortness of breath  She will be treated with empiric antibiotics.  Given immunocompromise state, will choose cefepime for resistant gram-negative fluids, azithromycin for atypicals  COVID-19/influenza/RSV negative.  Obtain sputum culture if able    Acute respiratory failure with hypoxia due to volume overload, possible pneumonia (HCC)  Assessment & Plan  Patient does require up to 6 L oxygen nasal cannula on admission  Etiology includes pulmonary edema and community-acquired pneumonia.  Patient is on apixaban, therefore PE seems to be less likely.  Additionally, would not use contrast due to tenuous renal function.  She was given IV Lasix 40 mg in the ED and started on empiric antibiotics  Will apply nitro ointment every 6 hours, Lasix 40 mg twice daily  Continue empiric antibiotics for pneumonia  Monitor input and output and daily weight.  Blood pressure control    CKD (chronic kidney disease), stage III (HCC)  Assessment & Plan  CKD stage III of transplanted kidney  Avoid nephrotoxins    Hypertensive urgency  Assessment & Plan  Continue home medications amlodipine, losartan  IV Lasix for volume reduction  Nitropaste  Monitor blood pressure  IV labetalol as needed.    Kidney transplant recipient  Assessment & Plan  The patient appears to be at baseline with her creatinine  Continue prednisone, tacrolimus, CellCept  Avoid nephrotoxins  Bicarb for acidosis    Chest pain- (present on admission)  Assessment & Plan  Probably angina secondary to uncontrolled blood pressure.  Patient denies worsening of chest pain on deep inspiration or with cough.  EKG has not changed, troponin is in  indeterminate range 27.  Patient is on anticoagulation with apixaban.  Plan: Nitro ointment, blood pressure control  Monitor on telemetry, repeat troponin  Obtain transthoracic echo        Acquired hypothyroidism- (present on admission)  Assessment & Plan  Continue levothyroxine    Diabetes (HCC)  Assessment & Plan  Type 2 diabetes   Will place on insulin sliding scale.  Most recent A1c 6.0.        VTE prophylaxis: heparin ppx

## 2023-04-01 NOTE — ED PROVIDER NOTES
ED Provider Note    CHIEF COMPLAINT  Chief Complaint   Patient presents with    Chest Pain     With radiation to RUE and associated SOB.  Onset of symptoms at 1500 today.  SOB onset last night.  Pt noted to be shivering, denies feeling cold.       EXTERNAL RECORDS REVIEWED  Inpatient Notes hospital discharge summary from February 18, 2023 for A-fib with RVR and Outpatient Notes multiple outpatient notes through the month of November for cardiology follow-up, additional endocrine follow-up on March 3 for diabetes    HPI/ROS  LIMITATION TO HISTORY   Thai; daughter assisting with translation  OUTSIDE HISTORIAN(S):  Family daughter at bedside also assisting with translation    Tere Gallegos is a 72 y.o. female who presents to the emergency department with chest pain and shortness of breath.  Past medical history as documented below.  Of note the patient recently had renal transplant in October and [facility] roughly 5 months ago.  Prior solitary kidney which failed.  Patient had been on roughly a decade of dialysis.    The patient does have a history of chronic chest pain as per the daughter at bedside translating.  Yesterday the patient started to feel unwell last evening to include chest pain with radiation to left arm and shortness of breath.  The symptoms however became significantly worse today ultimately prompting family to bring her here to the ER.    No note of any medication changes.  She has not missed any of her medication doses.  Her  has been sick with what they believe is a viral respiratory infection as of 1 week ago.  She to start to feel unwell yesterday similar to his symptoms    PAST MEDICAL HISTORY   has a past medical history of Acquired hypothyroidism (05/04/2020), CAD (coronary artery disease), Chronic diastolic heart failure (HCC) (05/04/2020), Coronary artery disease due to lipid rich plaque, Dental disorder, Diabetes (HCC), ESRD (end stage renal disease) on  dialysis (HCC) (05/04/2020), Hemodialysis patient (HCC), Hyperlipidemia, Hypertension, Kidney transplant candidate, Kidney transplant recipient (10/31/2022), Presence of drug-eluting stent in right coronary artery, QT prolongation (01/22/2020), RLS (restless legs syndrome) (08/05/2016), and Transaminitis (12/22/2018).    SURGICAL HISTORY   has a past surgical history that includes recovery (08/16/2016); other abdominal surgery; other (Left, 2014); zzz cardiac cath (08/16/2016); zzz cardiac cath (09/07/2016); and other abdominal surgery (Right, 10/31/2022).    FAMILY HISTORY  Family History   Problem Relation Age of Onset    Diabetes Sister     Other Sister         liver disease    Diabetes Brother     Heart Disease Neg Hx        SOCIAL HISTORY  Social History     Tobacco Use    Smoking status: Never    Smokeless tobacco: Never   Vaping Use    Vaping Use: Never used   Substance and Sexual Activity    Alcohol use: No     Alcohol/week: 0.0 oz    Drug use: No    Sexual activity: Never       CURRENT MEDICATIONS  Home Medications       Reviewed by Veronica Kelly (Pharmacy Tech) on 03/31/23 at 2044  Med List Status: Unable to Obtain     Medication Last Dose Status   amLODIPine (NORVASC) 10 MG Tab  Active   apixaban (ELIQUIS) 5 MG Tab tablet  Active   atorvastatin (LIPITOR) 20 MG Tab  Active   BD PEN NEEDLE PETE 2ND GEN  Active   Blood Glucose Monitoring Suppl (ONE TOUCH ULTRA 2) w/Device Kit  Active   docusate sodium (COLACE) 100 MG Cap  Active   doxazosin (CARDURA) 1 MG Tab  Active   fluticasone (FLONASE) 50 MCG/ACT nasal spray  Active   furosemide (LASIX) 20 MG Tab  Active   gabapentin (NEURONTIN) 100 MG Cap  Active   glucose blood (ONETOUCH VERIO) strip  Active   insulin aspart (NOVOLOG FLEXPEN) 100 UNIT/ML injection PEN  Active   insulin glargine (LANTUS SOLOSTAR) 100 UNIT/ML Solution Pen-injector injection  Active   Lancets  Active   levothyroxine (SYNTHROID) 75 MCG Tab  Active   losartan (COZAAR) 100 MG Tab   "Active   mycophenolate (CELLCEPT) 500 MG tablet  Active   predniSONE (DELTASONE) 5 MG Tab  Active   sodium bicarbonate (SODIUM BICARBONATE) 650 MG Tab  Active   tacrolimus (PROGRAF) 1 MG Cap  Active                    ALLERGIES  No Known Allergies    PHYSICAL EXAM  VITAL SIGNS: BP (!) 153/67   Pulse 69   Temp 37.1 °C (98.8 °F) (Temporal)   Resp 20   Ht 1.6 m (5' 3\")   Wt 60.4 kg (133 lb 2.5 oz)   LMP  (LMP Unknown)   SpO2 93%   BMI 23.59 kg/m²        Pulse ox interpretation: I interpret this pulse ox as normal.  Constitutional: Alert in no apparent distress.  HENT: No signs of trauma, Bilateral external ears normal, Nose normal.   Eyes: Pupils are equal and reactive  Neck: Normal range of motion, No tenderness, Supple  Cardiovascular: Regular rate and rhythm, no murmurs.   Thorax & Lungs: Normal breath sounds, tachypneic  Abdomen: Bowel sounds normal, Soft, No tenderness  Skin: Warm, Dry, No erythema, No rash.   Back: No bony tenderness, No CVA tenderness.   Extremities: Intact distal pulses, No edema, No tenderness, No cyanosis,  Negative Braulio's sign.   Musculoskeletal: Good range of motion in all major joints. No tenderness to palpation or major deformities noted.   Neurologic: Alert , Normal motor function, Normal sensory function, No focal deficits noted.   Psychiatric: Affect normal, Judgment normal, Mood normal.         DIAGNOSTIC STUDIES / PROCEDURES  LABS  Results for orders placed or performed during the hospital encounter of 03/31/23   Lactic acid (lactate)   Result Value Ref Range    Lactic Acid 1.4 0.5 - 2.0 mmol/L   CBC With Differential   Result Value Ref Range    WBC 7.3 4.8 - 10.8 K/uL    RBC 3.53 (L) 4.20 - 5.40 M/uL    Hemoglobin 10.1 (L) 12.0 - 16.0 g/dL    Hematocrit 32.2 (L) 37.0 - 47.0 %    MCV 91.2 81.4 - 97.8 fL    MCH 28.6 27.0 - 33.0 pg    MCHC 31.4 (L) 33.6 - 35.0 g/dL    RDW 42.5 35.9 - 50.0 fL    Platelet Count 95 (L) 164 - 446 K/uL    MPV 11.3 9.0 - 12.9 fL    Neutrophils-Polys " 90.50 (H) 44.00 - 72.00 %    Lymphocytes 2.70 (L) 22.00 - 41.00 %    Monocytes 5.90 0.00 - 13.40 %    Eosinophils 0.10 0.00 - 6.90 %    Basophils 0.30 0.00 - 1.80 %    Immature Granulocytes 0.50 0.00 - 0.90 %    Nucleated RBC 0.00 /100 WBC    Neutrophils (Absolute) 6.62 2.00 - 7.15 K/uL    Lymphs (Absolute) 0.20 (L) 1.00 - 4.80 K/uL    Monos (Absolute) 0.43 0.00 - 0.85 K/uL    Eos (Absolute) 0.01 0.00 - 0.51 K/uL    Baso (Absolute) 0.02 0.00 - 0.12 K/uL    Immature Granulocytes (abs) 0.04 0.00 - 0.11 K/uL    NRBC (Absolute) 0.00 K/uL   Comp Metabolic Panel   Result Value Ref Range    Sodium 138 135 - 145 mmol/L    Potassium 4.1 3.6 - 5.5 mmol/L    Chloride 106 96 - 112 mmol/L    Co2 18 (L) 20 - 33 mmol/L    Anion Gap 14.0 7.0 - 16.0    Glucose 147 (H) 65 - 99 mg/dL    Bun 24 (H) 8 - 22 mg/dL    Creatinine 1.50 (H) 0.50 - 1.40 mg/dL    Calcium 9.8 8.5 - 10.5 mg/dL    AST(SGOT) 21 12 - 45 U/L    ALT(SGPT) 29 2 - 50 U/L    Alkaline Phosphatase 87 30 - 99 U/L    Total Bilirubin 0.8 0.1 - 1.5 mg/dL    Albumin 4.2 3.2 - 4.9 g/dL    Total Protein 7.2 6.0 - 8.2 g/dL    Globulin 3.0 1.9 - 3.5 g/dL    A-G Ratio 1.4 g/dL   CORRECTED CALCIUM   Result Value Ref Range    Correct Calcium 9.6 8.5 - 10.5 mg/dL   ESTIMATED GFR   Result Value Ref Range    GFR (CKD-EPI) 36 (A) >60 mL/min/1.73 m 2   TROPONIN   Result Value Ref Range    Troponin T 26 (H) 6 - 19 ng/L   COV-2, FLU A/B, AND RSV BY PCR (2-4 HOURS CEPHEID): Collect NP swab in VTM    Specimen: Nasopharyngeal; Respirate   Result Value Ref Range    Influenza virus A RNA Negative Negative    Influenza virus B, PCR Negative Negative    RSV, PCR Negative Negative    SARS-CoV-2 by PCR NotDetected     SARS-CoV-2 Source NP Swab    EKG   Result Value Ref Range    Report       [facility] Emergency Dept.    Test Date:  2023-03-31  Pt Name:    DIOR STAPLESLILIAKEILY    Department: ER  MRN:        [id number]                      Room:  Gender:     Female                        Technician: [id number]  :        [id number]                   Requested By:ER TRIAGE PROTOCOL  Order #:    [id number]                    Reading MD:    Measurements  Intervals                                Axis  Rate:       83                           P:          -13  CT:         162                          QRS:        65  QRSD:       89                           T:          218  QT:         376  QTc:        442    Interpretive Statements  Sinus rhythm  Borderline low voltage, extremity leads  Probable LVH with secondary repol abnrm  Compared to ECG 2023 13:37:42  Bradycardia, nonsinus no longer present  Right-axis deviation no longer present  Q waves no longer present  T-wave abnormality no longer present  Possible ischemia no longer present           RADIOLOGY  I have independently interpreted the diagnostic imaging associated with this visit and am waiting the final reading from the radiologist.   My preliminary interpretation is as follows: Chest x-ray: Cardiomegaly, hazy inflammatory process to the right lung  Radiologist interpretation:   DX-CHEST-PORTABLE (1 VIEW)   Final Result      1.  Moderate cardiomegaly.   2.  Central perivascular and interstitial pulmonary edema asymmetrically greater on the right with patchy airspace opacities. Superimposed pneumonia is not excluded.      EC-ECHOCARDIOGRAM COMPLETE W/O CONT    (Results Pending)         COURSE & MEDICAL DECISION MAKING    ED Observation Status? No; Patient does not meet criteria for ED Observation.     INITIAL ASSESSMENT, COURSE AND PLAN  Care Narrative: Patient presented emerged department with chest pain and shortness of breath.  Broad differential to include ACS, infectious and thromboembolic phenomenon lead the differential        DISPOSITION AND DISCUSSIONS  I have discussed management of the patient with the following physicians and BETTE's: Hospitalist    Discussion of management with other QHP or appropriate source(s): Pharmacy for  sepsis protocol      32-year-old presenting to the emergency department with the above presentation.  With today's work-up I have a strong suspicion that the underlying presentation is secondary to pneumonia as identified on initial chest x-ray.  This is fitting with her tachypnea and dyspnea as well as her hypoxia.  I have a lower suspicion from an ACS standpoint.  She is a moderate risk.  Minimally elevated troponin at 26.  She does have slight increased lateral V5 V6 changes although very similar to prior.  Again I have a higher suspicion that the troponin is elevated secondary to chronic renal disease and possibly some systemic demand rather than ACS.  Furthermore chest pain, dyspnea and hypoxia can be seen with pulmonary embolus however I have a lower suspicion for this especially given higher suspicion for pneumonia being the primary etiology.  Furthermore there would be considerable consideration with IV contrasted studies given her solitary kidney and kidney transplant.  For this reason I have not pursued additional imaging to this end.    The patient has had improved oxygenation on supplemental oxygen.  She has been provided IV fluids and antibiotics as per sepsis protocol.  At this time I discussed the case with the hospitalist which revealed ongoing inpatient care at this time.  No evidence of significant endorgan damage or septic shock.    FINAL DIAGNOSIS  1. Chest pain, unspecified type    2. Pneumonia due to infectious organism, unspecified laterality, unspecified part of lung    3. Hypoxia    4. Elevated troponin           Electronically signed by: Carlos Paris M.D., 3/31/2023 5:56 PM

## 2023-04-01 NOTE — CARE PLAN
The patient is Watcher - Medium risk of patient condition declining or worsening    Shift Goals  Clinical Goals: decrease O2 needs, decrease fever  Patient Goals: sleep  Family Goals: TEE    Progress made toward(s) clinical / shift goals:    Problem: Care Map:  Admission Optimal Outcome for the Heart Failure Patient  Goal: Admission:  Optimal Care of the heart failure patient  Outcome: Progressing     Problem: Care Map:  Day 1 Optimal Outcome for the Heart Failure Patient  Goal: Day 1:  Optimal Care of the heart failure patient  Outcome: Progressing     Problem: Care Map:  Optimal Outcome for the Pneumonia Patient  Goal: Collection and monitoring of appropriate tests and labs  Outcome: Progressing     Problem: Respiratory  Goal: Patient will achieve/maintain optimum respiratory ventilation and gas exchange  Outcome: Progressing     Problem: Knowledge Deficit - Standard  Goal: Patient and family/care givers will demonstrate understanding of plan of care, disease process/condition, diagnostic tests and medications  Outcome: Progressing     Problem: Pain - Standard  Goal: Alleviation of pain or a reduction in pain to the patient’s comfort goal  Outcome: Progressing       Patient is not progressing towards the following goals:

## 2023-04-01 NOTE — PROGRESS NOTES
Patient enrolled in the 14 day [employer] Holter monitoring program per SAMMI Hines. Placed on 3/29/23  >In-Clinic hook-up, serial # [id number].   Called Customer service at [phone]   If removed needs to be sent/mailed back to [employer].

## 2023-04-01 NOTE — ASSESSMENT & PLAN NOTE
Etiology includes pulmonary edema and community-acquired pneumonia.  Patient is on apixaban, therefore PE seems to be less likely.    Treatment for presumed possible heart failure, volume overload as well as pneumonia  Significantly hypertensive initially, adjusting medication regimen, volume control  Continue treatment for multifocal  pneumonia  Monitor input and output and daily weight.     4/7 Hgb stable in mid 7's now back on apixaban

## 2023-04-01 NOTE — PROGRESS NOTES
Patient's saturating  in the high 70s, RT at bedside. Notified Dr Robles of saturation level.  Per MD RN to order stat chest xray. Upon RT assessment states oxygenation tubing not connected well. Per MD at bedside, okay to discontinue xray  and Wean off Nitro

## 2023-04-01 NOTE — ASSESSMENT & PLAN NOTE
The patient appears to be at baseline with her creatinine  Continue prednisone, tacrolimus, CellCept  Avoid nephrotoxins  Bicarb for acidosis

## 2023-04-01 NOTE — PROGRESS NOTES
Stepdown bed requested by the hospitalist; the patient is non-toxic satting well on HFNC 35/80; transfer order placed. A formal critical care consult has not been requested.    Jimmy Saldaña M.D.

## 2023-04-01 NOTE — ASSESSMENT & PLAN NOTE
Cultures neg  Currently on Cefepime plan to cont through 4/8 per ID recommendations  COVID-19/influenza/RSV negative.    MRSA negative, additional respiratory viral panel negative  The patient with positive BK virus titers, should typically not affect her lung function  CT positive for multifocal pneumonia  ID following  O2 requirement coming down    Will complete Cefepime tomorrow and plan to dc home if she cont's to do well overnight

## 2023-04-01 NOTE — ASSESSMENT & PLAN NOTE
CKD stage III of transplanted kidney  Nephrology following  Continue immunosuppression with caution  The patient apparently with BK virus infection/reactivation  Nephrology managing conjunction with transplant team  Daily BMP  Creat improving    4/5  Creat has stabilized around 1.3  Cont to monitor  Renally dose medications as appropriate

## 2023-04-01 NOTE — ED NOTES
Repeat lactic collected and sent to lab. Rounded on pt. Pt resting comfortably in bed at this time. On vitals monitor. Respirations equal and unlabored. Vitals signs stable. No acute distress at this time. Call light within reach.

## 2023-04-01 NOTE — ASSESSMENT & PLAN NOTE
As outpt is on metoprolol SR 25, losartan 100, doxazosin 1, amlodipine 10  In house is on  -minoxidil 2.5  -metoprolol SR 25  -losartan 100  -amlodipine 10  .  On above regimen pressures have been in target overnight without PRN coverage

## 2023-04-01 NOTE — ASSESSMENT & PLAN NOTE
History of ejection fraction 55%, follow-up on cardiac function by echocardiogram  Maintain euvolemia  Blood pressure control  On BB, ARB  Good candidate for an SGLT2i as outpt

## 2023-04-02 ENCOUNTER — APPOINTMENT (OUTPATIENT)
Dept: RADIOLOGY | Facility: MEDICAL CENTER | Age: 73
DRG: 193 | End: 2023-04-02
Attending: HOSPITALIST
Payer: MEDICARE

## 2023-04-02 ENCOUNTER — APPOINTMENT (OUTPATIENT)
Dept: RADIOLOGY | Facility: MEDICAL CENTER | Age: 73
DRG: 193 | End: 2023-04-02
Attending: INTERNAL MEDICINE
Payer: MEDICARE

## 2023-04-02 PROBLEM — N18.32 ANEMIA DUE TO STAGE 3B CHRONIC KIDNEY DISEASE: Status: ACTIVE | Noted: 2023-04-02

## 2023-04-02 PROBLEM — D63.1 ANEMIA DUE TO STAGE 3B CHRONIC KIDNEY DISEASE: Status: ACTIVE | Noted: 2023-04-02

## 2023-04-02 LAB
ALBUMIN SERPL BCP-MCNC: 3.1 G/DL (ref 3.2–4.9)
ALBUMIN/GLOB SERPL: 1.2 G/DL
ALP SERPL-CCNC: 55 U/L (ref 30–99)
ALT SERPL-CCNC: 20 U/L (ref 2–50)
ANION GAP SERPL CALC-SCNC: 11 MMOL/L (ref 7–16)
AST SERPL-CCNC: 18 U/L (ref 12–45)
B PARAP IS1001 DNA NPH QL NAA+NON-PROBE: NOT DETECTED
B PERT.PT PRMT NPH QL NAA+NON-PROBE: NOT DETECTED
BASOPHILS # BLD AUTO: 0.2 % (ref 0–1.8)
BASOPHILS # BLD: 0.01 K/UL (ref 0–0.12)
BILIRUB SERPL-MCNC: 0.9 MG/DL (ref 0.1–1.5)
BUN SERPL-MCNC: 32 MG/DL (ref 8–22)
C PNEUM DNA NPH QL NAA+NON-PROBE: NOT DETECTED
CALCIUM ALBUM COR SERPL-MCNC: 9.4 MG/DL (ref 8.5–10.5)
CALCIUM SERPL-MCNC: 8.7 MG/DL (ref 8.5–10.5)
CHLORIDE SERPL-SCNC: 106 MMOL/L (ref 96–112)
CMV DNA SERPL NAA+PROBE-ACNC: NOT DETECTED [IU]/ML
CMV DNA SERPL NAA+PROBE-LOG IU: NOT DETECTED {LOG_IU}/ML
CMV DNA SERPL QL NAA+PROBE: NOT DETECTED
CO2 SERPL-SCNC: 21 MMOL/L (ref 20–33)
CREAT SERPL-MCNC: 1.87 MG/DL (ref 0.5–1.4)
EOSINOPHIL # BLD AUTO: 0.03 K/UL (ref 0–0.51)
EOSINOPHIL NFR BLD: 0.6 % (ref 0–6.9)
ERYTHROCYTE [DISTWIDTH] IN BLOOD BY AUTOMATED COUNT: 42.5 FL (ref 35.9–50)
FLUAV RNA NPH QL NAA+NON-PROBE: NOT DETECTED
FLUBV RNA NPH QL NAA+NON-PROBE: NOT DETECTED
GFR SERPLBLD CREATININE-BSD FMLA CKD-EPI: 28 ML/MIN/1.73 M 2
GLOBULIN SER CALC-MCNC: 2.6 G/DL (ref 1.9–3.5)
GLUCOSE BLD STRIP.AUTO-MCNC: 118 MG/DL (ref 65–99)
GLUCOSE BLD STRIP.AUTO-MCNC: 163 MG/DL (ref 65–99)
GLUCOSE BLD STRIP.AUTO-MCNC: 165 MG/DL (ref 65–99)
GLUCOSE BLD STRIP.AUTO-MCNC: 182 MG/DL (ref 65–99)
GLUCOSE BLD STRIP.AUTO-MCNC: 184 MG/DL (ref 65–99)
GLUCOSE SERPL-MCNC: 118 MG/DL (ref 65–99)
HADV DNA NPH QL NAA+NON-PROBE: NOT DETECTED
HCOV 229E RNA NPH QL NAA+NON-PROBE: NOT DETECTED
HCOV HKU1 RNA NPH QL NAA+NON-PROBE: NOT DETECTED
HCOV NL63 RNA NPH QL NAA+NON-PROBE: NOT DETECTED
HCOV OC43 RNA NPH QL NAA+NON-PROBE: NOT DETECTED
HCT VFR BLD AUTO: 22.3 % (ref 37–47)
HCT VFR BLD AUTO: 24.2 % (ref 37–47)
HCT VFR BLD AUTO: 25.1 % (ref 37–47)
HGB BLD-MCNC: 7.2 G/DL (ref 12–16)
HGB BLD-MCNC: 7.6 G/DL (ref 12–16)
HGB BLD-MCNC: 8 G/DL (ref 12–16)
HMPV RNA NPH QL NAA+NON-PROBE: NOT DETECTED
HPIV1 RNA NPH QL NAA+NON-PROBE: NOT DETECTED
HPIV2 RNA NPH QL NAA+NON-PROBE: NOT DETECTED
HPIV3 RNA NPH QL NAA+NON-PROBE: NOT DETECTED
HPIV4 RNA NPH QL NAA+NON-PROBE: NOT DETECTED
IMM GRANULOCYTES # BLD AUTO: 0.03 K/UL (ref 0–0.11)
IMM GRANULOCYTES NFR BLD AUTO: 0.6 % (ref 0–0.9)
LYMPHOCYTES # BLD AUTO: 0.29 K/UL (ref 1–4.8)
LYMPHOCYTES NFR BLD: 5.6 % (ref 22–41)
M PNEUMO DNA NPH QL NAA+NON-PROBE: NOT DETECTED
MAGNESIUM SERPL-MCNC: 1.5 MG/DL (ref 1.5–2.5)
MCH RBC QN AUTO: 28.9 PG (ref 27–33)
MCHC RBC AUTO-ENTMCNC: 32.3 G/DL (ref 33.6–35)
MCV RBC AUTO: 89.6 FL (ref 81.4–97.8)
MONOCYTES # BLD AUTO: 0.32 K/UL (ref 0–0.85)
MONOCYTES NFR BLD AUTO: 6.2 % (ref 0–13.4)
NEUTROPHILS # BLD AUTO: 4.49 K/UL (ref 2–7.15)
NEUTROPHILS NFR BLD: 86.8 % (ref 44–72)
NRBC # BLD AUTO: 0 K/UL
NRBC BLD-RTO: 0 /100 WBC
NT-PROBNP SERPL IA-MCNC: 3820 PG/ML (ref 0–125)
PHOSPHATE SERPL-MCNC: 3 MG/DL (ref 2.5–4.5)
PLATELET # BLD AUTO: 72 K/UL (ref 164–446)
PMV BLD AUTO: 12.5 FL (ref 9–12.9)
POTASSIUM SERPL-SCNC: 4.4 MMOL/L (ref 3.6–5.5)
PROCALCITONIN SERPL-MCNC: 1.08 NG/ML
PROT SERPL-MCNC: 5.7 G/DL (ref 6–8.2)
RBC # BLD AUTO: 2.49 M/UL (ref 4.2–5.4)
RSV RNA NPH QL NAA+NON-PROBE: NOT DETECTED
RV+EV RNA NPH QL NAA+NON-PROBE: NOT DETECTED
SARS-COV-2 RNA NPH QL NAA+NON-PROBE: NOTDETECTED
SCCMEC + MECA PNL NOSE NAA+PROBE: NEGATIVE
SCCMEC + MECA PNL NOSE NAA+PROBE: NEGATIVE
SODIUM SERPL-SCNC: 138 MMOL/L (ref 135–145)
WBC # BLD AUTO: 5.2 K/UL (ref 4.8–10.8)

## 2023-04-02 PROCEDURE — 87633 RESP VIRUS 12-25 TARGETS: CPT

## 2023-04-02 PROCEDURE — 83735 ASSAY OF MAGNESIUM: CPT

## 2023-04-02 PROCEDURE — 94669 MECHANICAL CHEST WALL OSCILL: CPT

## 2023-04-02 PROCEDURE — 84100 ASSAY OF PHOSPHORUS: CPT

## 2023-04-02 PROCEDURE — 85025 COMPLETE CBC W/AUTO DIFF WBC: CPT

## 2023-04-02 PROCEDURE — 700111 HCHG RX REV CODE 636 W/ 250 OVERRIDE (IP): Performed by: INTERNAL MEDICINE

## 2023-04-02 PROCEDURE — 700102 HCHG RX REV CODE 250 W/ 637 OVERRIDE(OP): Performed by: INTERNAL MEDICINE

## 2023-04-02 PROCEDURE — 71045 X-RAY EXAM CHEST 1 VIEW: CPT

## 2023-04-02 PROCEDURE — A9270 NON-COVERED ITEM OR SERVICE: HCPCS | Performed by: INTERNAL MEDICINE

## 2023-04-02 PROCEDURE — 85018 HEMOGLOBIN: CPT

## 2023-04-02 PROCEDURE — 82270 OCCULT BLOOD FECES: CPT

## 2023-04-02 PROCEDURE — 85014 HEMATOCRIT: CPT | Mod: 91

## 2023-04-02 PROCEDURE — A9270 NON-COVERED ITEM OR SERVICE: HCPCS | Performed by: HOSPITALIST

## 2023-04-02 PROCEDURE — 87486 CHLMYD PNEUM DNA AMP PROBE: CPT

## 2023-04-02 PROCEDURE — 99291 CRITICAL CARE FIRST HOUR: CPT | Performed by: HOSPITALIST

## 2023-04-02 PROCEDURE — 87581 M.PNEUMON DNA AMP PROBE: CPT

## 2023-04-02 PROCEDURE — 83880 ASSAY OF NATRIURETIC PEPTIDE: CPT

## 2023-04-02 PROCEDURE — 80197 ASSAY OF TACROLIMUS: CPT

## 2023-04-02 PROCEDURE — 93970 EXTREMITY STUDY: CPT

## 2023-04-02 PROCEDURE — 82962 GLUCOSE BLOOD TEST: CPT

## 2023-04-02 PROCEDURE — 87798 DETECT AGENT NOS DNA AMP: CPT

## 2023-04-02 PROCEDURE — 700105 HCHG RX REV CODE 258: Performed by: INTERNAL MEDICINE

## 2023-04-02 PROCEDURE — 87640 STAPH A DNA AMP PROBE: CPT

## 2023-04-02 PROCEDURE — 87641 MR-STAPH DNA AMP PROBE: CPT

## 2023-04-02 PROCEDURE — 99232 SBSQ HOSP IP/OBS MODERATE 35: CPT | Performed by: INTERNAL MEDICINE

## 2023-04-02 PROCEDURE — 700102 HCHG RX REV CODE 250 W/ 637 OVERRIDE(OP): Performed by: HOSPITALIST

## 2023-04-02 PROCEDURE — 94640 AIRWAY INHALATION TREATMENT: CPT

## 2023-04-02 PROCEDURE — 700111 HCHG RX REV CODE 636 W/ 250 OVERRIDE (IP): Performed by: HOSPITALIST

## 2023-04-02 PROCEDURE — 84145 PROCALCITONIN (PCT): CPT

## 2023-04-02 PROCEDURE — 99223 1ST HOSP IP/OBS HIGH 75: CPT | Performed by: INTERNAL MEDICINE

## 2023-04-02 PROCEDURE — 80053 COMPREHEN METABOLIC PANEL: CPT

## 2023-04-02 PROCEDURE — 770000 HCHG ROOM/CARE - INTERMEDIATE ICU *

## 2023-04-02 PROCEDURE — 93970 EXTREMITY STUDY: CPT | Mod: 26 | Performed by: INTERNAL MEDICINE

## 2023-04-02 RX ORDER — OMEPRAZOLE 20 MG/1
20 CAPSULE, DELAYED RELEASE ORAL DAILY
Status: DISCONTINUED | OUTPATIENT
Start: 2023-04-02 | End: 2023-04-08 | Stop reason: HOSPADM

## 2023-04-02 RX ORDER — MYCOPHENOLATE MOFETIL 250 MG/1
500 CAPSULE ORAL 2 TIMES DAILY
Status: DISCONTINUED | OUTPATIENT
Start: 2023-04-02 | End: 2023-04-03

## 2023-04-02 RX ORDER — MYCOPHENOLATE MOFETIL 500 MG/1
1000 TABLET ORAL 2 TIMES DAILY
Status: DISCONTINUED | OUTPATIENT
Start: 2023-04-02 | End: 2023-04-02

## 2023-04-02 RX ADMIN — INSULIN HUMAN 1 UNITS: 100 INJECTION, SOLUTION PARENTERAL at 20:17

## 2023-04-02 RX ADMIN — AMLODIPINE BESYLATE 10 MG: 10 TABLET ORAL at 05:09

## 2023-04-02 RX ADMIN — ATORVASTATIN CALCIUM 20 MG: 20 TABLET, FILM COATED ORAL at 20:17

## 2023-04-02 RX ADMIN — SODIUM BICARBONATE 1300 MG: 650 TABLET ORAL at 20:17

## 2023-04-02 RX ADMIN — PREDNISONE 5 MG: 10 TABLET ORAL at 05:09

## 2023-04-02 RX ADMIN — APIXABAN 5 MG: 5 TABLET, FILM COATED ORAL at 05:09

## 2023-04-02 RX ADMIN — CEFEPIME 1 G: 1 INJECTION, POWDER, FOR SOLUTION INTRAMUSCULAR; INTRAVENOUS at 00:08

## 2023-04-02 RX ADMIN — TACROLIMUS 2 MG: 1 CAPSULE ORAL at 17:10

## 2023-04-02 RX ADMIN — INSULIN HUMAN 1 UNITS: 100 INJECTION, SOLUTION PARENTERAL at 11:59

## 2023-04-02 RX ADMIN — OMEPRAZOLE 20 MG: 20 CAPSULE, DELAYED RELEASE ORAL at 13:56

## 2023-04-02 RX ADMIN — FUROSEMIDE 40 MG: 10 INJECTION, SOLUTION INTRAMUSCULAR; INTRAVENOUS at 05:08

## 2023-04-02 RX ADMIN — TACROLIMUS 1 MG: 1 CAPSULE ORAL at 05:09

## 2023-04-02 RX ADMIN — MYCOPHENOLATE MOFETIL 500 MG: 250 CAPSULE ORAL at 17:11

## 2023-04-02 RX ADMIN — LOSARTAN POTASSIUM 100 MG: 50 TABLET, FILM COATED ORAL at 17:09

## 2023-04-02 RX ADMIN — CEFEPIME 1 G: 1 INJECTION, POWDER, FOR SOLUTION INTRAMUSCULAR; INTRAVENOUS at 23:51

## 2023-04-02 RX ADMIN — SODIUM BICARBONATE 1300 MG: 650 TABLET ORAL at 07:54

## 2023-04-02 RX ADMIN — POTASSIUM CHLORIDE 40 MEQ: 1500 TABLET, EXTENDED RELEASE ORAL at 05:08

## 2023-04-02 RX ADMIN — MYCOPHENOLATE MOFETIL 500 MG: 250 CAPSULE ORAL at 11:58

## 2023-04-02 RX ADMIN — INSULIN HUMAN 1 UNITS: 100 INJECTION, SOLUTION PARENTERAL at 17:25

## 2023-04-02 RX ADMIN — AZITHROMYCIN MONOHYDRATE 500 MG: 250 TABLET ORAL at 17:10

## 2023-04-02 RX ADMIN — DOCUSATE SODIUM 50 MG AND SENNOSIDES 8.6 MG 2 TABLET: 8.6; 5 TABLET, FILM COATED ORAL at 05:08

## 2023-04-02 RX ADMIN — METOPROLOL SUCCINATE 25 MG: 25 TABLET, EXTENDED RELEASE ORAL at 05:09

## 2023-04-02 RX ADMIN — SODIUM BICARBONATE 1300 MG: 650 TABLET ORAL at 13:56

## 2023-04-02 ASSESSMENT — ENCOUNTER SYMPTOMS
CHILLS: 0
DIZZINESS: 0
SINUS PAIN: 0
GASTROINTESTINAL NEGATIVE: 1
WHEEZING: 0
BRUISES/BLEEDS EASILY: 0
COUGH: 1
HEARTBURN: 0
SHORTNESS OF BREATH: 1
DEPRESSION: 0
WEIGHT LOSS: 0
FOCAL WEAKNESS: 0
NERVOUS/ANXIOUS: 1
POLYDIPSIA: 0
HEMOPTYSIS: 0
EYES NEGATIVE: 1
ABDOMINAL PAIN: 0
HEADACHES: 0
FEVER: 1
FEVER: 0
BACK PAIN: 0
FLANK PAIN: 0
WEAKNESS: 0
NEUROLOGICAL NEGATIVE: 1
SPUTUM PRODUCTION: 1
NAUSEA: 0
PALPITATIONS: 0
VOMITING: 0
CHILLS: 1
NECK PAIN: 0
CARDIOVASCULAR NEGATIVE: 1
MUSCULOSKELETAL NEGATIVE: 1

## 2023-04-02 ASSESSMENT — PAIN DESCRIPTION - PAIN TYPE
TYPE: ACUTE PAIN

## 2023-04-02 ASSESSMENT — FIBROSIS 4 INDEX: FIB4 SCORE: 4.08

## 2023-04-02 NOTE — CONSULTS
[facility] INFECTIOUS DISEASES INPATIENT CONSULT NOTE     Date of Service: 2023    Consult Requested By: Max Robles M.D.    Reason for Consultation: Shortness of breath    Chief Complaint: Shortness of breath    History of Present Illness:     Tere Gallegos is a 72 y.o. female admitted 3/31/2023. Extensive review of documentation from multiple specialties performed in generating this HPI.  Patient with history of  donor renal transplant in 2022, CAD status post PCI, paroxysmal A-fib, chronic anticoagulation with Eliquis, hypertension, hypothyroidism, HFpEF, type 2 diabetes, hypertension, presented with approximately 1 day of acute onset of shortness of breath at rest with a pleuritic chest pain, central chest pain radiating to the right shoulder.  She was placed on 2 L nasal cannula oxygen, then up to 4 to 6 L.  SARS-CoV-2, flu and RSV screening were negative.  Patient with CKD stage III with creatinine at baseline.  Patient with Tmax of 102, white count 7.3, was started on cefepime and azithromycin.  There has been some improvement in fever curve since.  Procalcitonin negative.  Chest x-ray with interstitial and patchy airspace opacities.    Regarding her renal transplant, patient is on tacrolimus, CellCept, prednisone 5 mg daily.  Recent positive BK virus viremia and then urine 3/3/2023, being monitored by [facility].  Recent CMV quant PCR from blood negative.  Patient notes he feels somewhat better but remains on high flow nasal cannula oxygen at this time.  No recent sick contacts.    Creatinine today is 1.87, albumin 3.1, magnesium 1.5.    Review of Systems:  All other systems reviewed and are negative expect as noted in HPI    Past Medical History:   Diagnosis Date    Acquired hypothyroidism 2020    CAD (coronary artery disease)     Chronic diastolic heart failure (HCC) 2020    Coronary artery disease due to lipid rich plaque     2 Synergy NOEMI to 100% RCA  stent placed    Dental disorder     partial dentures- uppers    Diabetes (HCC)     oral medication    ESRD (end stage renal disease) on dialysis (Formerly [facility]) 2020    Hemodialysis patient (Formerly [facility])     M, W, F    Hyperlipidemia     Hypertension     Kidney transplant candidate     Kidney transplant recipient 10/31/2022    Presence of drug-eluting stent in right coronary artery     QT prolongation 2020    RLS (restless legs syndrome) 2016    Transaminitis 2018       Past Surgical History:   Procedure Laterality Date    OTHER ABDOMINAL SURGERY Right 10/31/2022     Donor kidney transplant - [facility]    ZZZ CARDIAC CATH  2016    RCA stented with 2 Synergy drug-eluting stents.    RECOVERY  2016    Procedure: CATH LAB [facility] WITH POSSIBLE DR. CASTILLO;  Surgeon: Drew Surgery;  [place] [facility] [place]:     ZZZ CARDIAC CATH  2016    100% RCA    OTHER Left 2014    left arm upper extremity fistula    OTHER ABDOMINAL SURGERY      left kidney removed due to cancer       Family History   Problem Relation Age of Onset    Diabetes Sister     Other Sister         liver disease    Diabetes Brother     Heart Disease Neg Hx        Social History     Socioeconomic History    Marital status:      Spouse name: Not on file    Number of children: Not on file    Years of education: Not on file    Highest education level: Not on file   Occupational History    Not on file   Tobacco Use    Smoking status: Never    Smokeless tobacco: Never   Vaping Use    Vaping Use: Never used   Substance and Sexual Activity    Alcohol use: No     Alcohol/week: 0.0 oz    Drug use: No    Sexual activity: Never   Other Topics Concern    Not on file   Social History Narrative    Not on file     Social Determinants of Health     Financial Resource Strain: Not on file   Food Insecurity: Not on file   Transportation Needs: Not on file   Physical Activity: Not on file    Stress: Not on file   Social Connections: Not on file   Intimate Partner Violence: Not on file   Housing Stability: Not on file       No Known Allergies    Medications:    Current Facility-Administered Medications:     nitroglycerin 50 mg in D5W 250 ml infusion, 0-200 mcg/min, Intravenous, Continuous, Timbo Griffin M.D., Stopped at 04/01/23 1800    azithromycin (ZITHROMAX) tablet 500 mg, 500 mg, Oral, Q EVENING, Max Robles M.D., 500 mg at 04/01/23 1803    sodium bicarbonate tablet 1,300 mg, 1,300 mg, Oral, TID, Karis Fernando M.D., 1,300 mg at 04/02/23 0754    senna-docusate (PERICOLACE or SENOKOT S) 8.6-50 MG per tablet 2 Tablet, 2 Tablet, Oral, BID, 2 Tablet at 04/02/23 0508 **AND** polyethylene glycol/lytes (MIRALAX) PACKET 1 Packet, 1 Packet, Oral, QDAY PRN **AND** magnesium hydroxide (MILK OF MAGNESIA) suspension 30 mL, 30 mL, Oral, QDAY PRN **AND** bisacodyl (DULCOLAX) suppository 10 mg, 10 mg, Rectal, QDAY PRN, Timbo Griffin M.D.    Respiratory Therapy Consult, , Nebulization, Continuous RT, Timbo Griffin M.D.    acetaminophen (Tylenol) tablet 650 mg, 650 mg, Oral, Q6HRS PRN, Timbo Griffin M.D., 650 mg at 04/01/23 2007    ondansetron (ZOFRAN) syringe/vial injection 4 mg, 4 mg, Intravenous, Q4HRS PRN, Timbo Griffin M.D., 4 mg at 04/01/23 2006    ondansetron (ZOFRAN ODT) dispertab 4 mg, 4 mg, Oral, Q4HRS PRN, Timbo Griffin M.D.    labetalol (NORMODYNE/TRANDATE) injection 10 mg, 10 mg, Intravenous, Q4HRS PRN, Timbo Griffin M.D.    cefepime (Maxipime) 1 g in  mL IVPB, 1 g, Intravenous, Q24HRS, Timbo Griffin M.D., Stopped at 04/02/23 0038    guaiFENesin dextromethorphan (ROBITUSSIN DM) 100-10 MG/5ML syrup 10 mL, 10 mL, Oral, Q6HRS PRN, Timbo Griffin M.D.    amLODIPine (NORVASC) tablet 10 mg, 10 mg, Oral, DAILY, Timbo Griffin M.D., 10 mg at 04/02/23 0509    apixaban (ELIQUIS) tablet 5 mg, 5 mg, Oral, BID, Timbo Griffin M.D., 5 mg at 04/02/23 0509     "atorvastatin (LIPITOR) tablet 20 mg, 20 mg, Oral, Nightly, Timbo Griffin M.D., 20 mg at 23    losartan (COZAAR) tablet 100 mg, 100 mg, Oral, Q EVENING, Timbo Griffin M.D., 100 mg at 23 180    predniSONE (DELTASONE) tablet 5 mg, 5 mg, Oral, DAILY, Timbo Griffin M.D., 5 mg at 23 0509    tacrolimus (PROGRAF) capsule 1 mg, 1 mg, Oral, QAM, Timbo Griffin M.D., 1 mg at 23 0509    oxyCODONE immediate-release (ROXICODONE) tablet 5 mg, 5 mg, Oral, Q4HRS PRN, Timbo Griffin M.D.    HYDROmorphone (Dilaudid) injection 0.5 mg, 0.5 mg, Intravenous, Q2HRS PRN, Timbo Griffin M.D., 0.5 mg at 23 0012    furosemide (LASIX) injection 40 mg, 40 mg, Intravenous, BID DIURETIC, Timbo Griffin M.D., 40 mg at 23 0508    insulin regular (HumuLIN R,NovoLIN R) injection, 1-6 Units, Subcutaneous, 4X/DAY ACHS, 1 Units at 23 **AND** POC blood glucose manual result, , , Q AC AND BEDTIME(S) **AND** NOTIFY MD and PharmD, , , Once **AND** Administer 20 grams of glucose (approximately 8 ounces of fruit juice) every 15 minutes PRN FSBG less than 70 mg/dL, , , PRN **AND** dextrose 10 % BOLUS 25 g, 25 g, Intravenous, Q15 MIN PRN, Timbo Griffin M.D.    tacrolimus (PROGRAF) capsule 2 mg, 2 mg, Oral, Q EVENING, Timbo Griffin M.D., 2 mg at 23    potassium chloride SA (Kdur) tablet 40 mEq, 40 mEq, Oral, DAILY, Timbo Griffin M.D., 40 mEq at 23 0508    metoprolol SR (TOPROL XL) tablet 25 mg, 25 mg, Oral, DAILY, Timbo Griffin M.D., 25 mg at 23 0509    Physical Exam:   Vital Signs: BP (!) 142/56   Pulse 60   Temp 36.9 °C (98.4 °F) (Bladder)   Resp 18   Ht 1.6 m (5' 3\")   Wt 63.8 kg (140 lb 10.5 oz)   LMP  (LMP Unknown)   SpO2 99%   BMI 24.92 kg/m²   Temp  Av.3 °C (99.2 °F)  Min: 36.5 °C (97.7 °F)  Max: 38.4 °C (101.2 °F)  Vital signs reviewed    Constitutional: Patient is oriented to person, place, and time. Appears well-developed and " well-nourished. No distress  Head: Atraumatic, normocephalic  Eyes: Conjunctivae normal, EOM intact. Pupils are equal, round, and reactive to light.   Mouth/Throat: Lips without lesions, good dentition, oropharynx is clear and moist.  Neck: Neck supple. No masses/lymphadenopathy  Cardiovascular: Normal rate, regular rhythm, normal S1S2 and intact distal pulses. No murmur, gallop, or friction rub. No pedal edema.  Pulmonary/Chest: No respiratory distress. Unlabored respiratory effort, lungs clear to auscultation. No wheezes or rales.   Abdominal: Soft, non tender. BS + x 4. No masses or hepatosplenomegaly.   Musculoskeletal: No joint tenderness, swelling, erythema, or restriction of motion noted.  Neurological: Alert and oriented to person, place, and time. No gross cranial nerve deficit. No focal neural deficit noted  Skin: Skin is warm and dry. No rashes or embolic phenomena noted on exposed skin  Psychiatric: Normal mood and affect. Behavior is normal.     LABS:  Recent Labs     23  0230   WBC 7.3 7.1 5.2      Recent Labs     238 23  0230   HEMOGLOBIN 10.1* 9.6* 7.2*   HEMATOCRIT 32.2* 30.9* 22.3*   MCV 91.2 92.2 89.6   MCH 28.6 28.7 28.9   PLATELETCT 95* 92* 72*       Recent Labs     238 23  0230   SODIUM 138 138 138   POTASSIUM 4.1 4.4 4.4   CHLORIDE 106 107 106   CO2 18* 16* 21   CREATININE 1.50* 1.51* 1.87*        Recent Labs     238 23  0230   ALBUMIN 4.2 4.0 3.1*        MICRO:  Blood Culture Hold   Date Value Ref Range Status   10/19/2022 Collected  Final        Latest pertinent labs were reviewed    IMAGING STUDIES:  As above    Hospital Course/Assessment:   Tere Gallegos is a 72 y.o. female patient with history of  donor renal transplant in 2022 currently on tacrolimus, CellCept, prednisone 5 mg daily, CAD status post PCI, paroxysmal A-fib, chronic  anticoagulation with Eliquis, hypertension, hypothyroidism, HFpEF, type 2 diabetes, hypertension, presented with approximately 1 day of acute onset of shortness of breath at rest with a pleuritic chest pain found to be febrile and hypoxemic, chest x-ray concerning for pneumonia versus fluid overload.  Procalcitonin is negative.    Regarding her renal transplant, patient is on tacrolimus, CellCept, prednisone 5 mg daily.  Recent positive BK virus viremia and then urine 3/3/2023, being monitored by [facility].  Recent CMV quant PCR from blood negativeimmunosuppression,    Pertinent Diagnoses:  Acute hypoxemic respiratory failure, currently requiring high flow nasal cannula oxygen  Community-acquired pneumonia  Status post renal transplant  CKD stage III  Immunosuppressed status  BK viremia  CAD  Type 2 diabetes    Plan:   -Okay to continue renally dosed IV cefepime and azithromycin for now.  Recommend CT chest to further characterize lesions given immunosuppressed status  -Will check MRSA nares swab, respiratory viral panel  -Begin viremia noted, being managed by transplant clinic in [facility].  Last decreased immunosuppression dose of CellCept, also due to leukopenia at that time.  Monitor renal function, nephrology is on board    Plan was discussed with the primary team, Dr. Robles    ID will follow. Please feel free to call with questions.  This illness poses a risk to patient's life    Toby Can M.D.    Please note that this dictation was created using voice recognition software. I have worked with technical experts from [employer] to optimize the interface.  I have made every reasonable attempt to correct obvious errors, but there may be errors of grammar and possibly content that I did not discover before finalizing the note.

## 2023-04-02 NOTE — CARE PLAN
Problem: Humidified High Flow Nasal Cannula  Goal: Maintain adequate oxygenation dependent on patient condition  Description: Target End Date:  resolve prior to discharge or when underlying condition is resolved/stabilized    1.  Implement humidified high flow oxygen therapy  2.  Titrate high flow oxygen to maintain appropriate SpO2  Outcome: Progressing     Problem: Hyperinflation  Goal: Prevent or improve atelectasis  Description: Target End Date:  3 to 4 days    1. Instruct incentive spirometry usage  2.  Perform hyperinflation therapy as indicated  Outcome: Progressing

## 2023-04-02 NOTE — PROGRESS NOTES
Notified hospitalist that pts hemoglobin dropped from 9.6 to 7.2 in am labs. No obvious signs of bleeding.

## 2023-04-02 NOTE — CARE PLAN
The patient is Watcher - Medium risk of patient condition declining or worsening    Shift Goals  Clinical Goals: decrease O2 demand  Patient Goals: comfort, rest  Family Goals: comfort    Progress made toward(s) clinical / shift goals:    Problem: Care Map:  Admission Optimal Outcome for the Heart Failure Patient  Goal: Admission:  Optimal Care of the heart failure patient  Outcome: Progressing     Problem: Care Map:  Day 2 Optimal Outcome for the Heart Failure Patient  Goal: Day 2:  Optimal Care of the heart failure patient  Outcome: Progressing     Problem: Care Map:  Optimal Outcome for the Pneumonia Patient  Goal: Collection and monitoring of appropriate tests and labs  Outcome: Progressing     Problem: Respiratory  Goal: Patient will achieve/maintain optimum respiratory ventilation and gas exchange  Outcome: Progressing     Problem: Hemodynamics - Pneumonia  Goal: Patient's hemodynamics, fluid balance and neurologic status will be stable or improve  Outcome: Progressing       Patient is not progressing towards the following goals:

## 2023-04-02 NOTE — PROGRESS NOTES
Hospital Medicine Daily Progress Note    Date of Service  4/2/2023    Chief Complaint  Tere Gallegos is a 72 y.o. female admitted 3/31/2023 with dyspnea, fevers, shivering, chest pain    Hospital Course  Tere Gallegos is a 72 y.o. female with past medical history of kidney transplant in October 2022, CAD, NOEMI to RCA, paroxysmal A-fib, anticoagulation with Eliquis, renovascular hypertension, hypothyroidism, heart failure with preserved ejection fraction, type 2 diabetes, hypertension, restless leg syndrome, who presented 3/31/2023 with complaints of shortness of breath and chest pain.  Patient is experiencing shortness of breath at rest and on exertion since last night.  Today she started having dry cough, chills, subjective fever.  At 3 PM she started having constant dull sensation in the middle of the chest, radiating to the right shoulder.  In ER patient noted to be mildly hypertensive 189/79, desaturated to 87% on room air, was placed on 2 L nasal cannula, then on 4 and 6 L subsequently.  COVID-19/influenza/RSV screen is negative.  Blood work did not show any significant worsening of kidney function, but did show mild acidosis with bicarb of 18.  Chest x-ray showed pulmonary edema and patchy airspace opacities, which is consistent with pneumonia and/or pulmonary edema.  Lactic acid is not elevated.  Troponin is 26.  EKG showed sinus rhythm, heart rate 86, chronic T wave inversion in lateral leads.  The patient on telemetry found in atrial fibrillation, flutter, rate controlled    Interval Problem Update  Patient seen and examined today.  Data, Medication data reviewed.  Case discussed with nursing as available.  Plan of Care reviewed with patient and notified of changes.   4/1 the patient currently afebrile, heart rates in his 60s, respiration slightly tachypneic but not labored, the patient remains on high flow nasal cannula, 35 L, 80% FiO2, blood pressure in the 130s over 60s, restart  nitroglycerin for blood pressure control, currently no chest pain, mild cough, laboratory data with a white cell count of 7.1, hemoglobin 9.6, hematocrit 30.9, platelet count 92, chemistry with a sodium of 133, potassium 4.4, chloride 107, bicarb 16, glucose 165, BUN 22, creatinine 1.51, magnesium 1.4, lactic acid 1.4, tacrolimus level 2.2 on 3/30, troponin in the indeterminate range, 37, BNP 3985,  The patient had kidney transplant in October at [facility] [place]  Discussed with infectious disease, discussed with nephrology  4/2 the patient is improved, slightly improved oxygenation, currently afebrile, slight fever last night up to 100.6, heart rate in the 50s and 60s, atrial fibrillation, currently on 35 L, 70% FiO2, blood pressure 140s over 60s, decrease in hemoglobin, no definitive cause, the patient recently taken off CellCept secondary to leukopenia, renal function slightly worsened with a creatinine up to 1.87 on Lasix, respiratory viral panel ordered, negative so far, sputum culture negative, blood cultures remain negative so far, negative lower extremity ultrasound for DVT, decreased pulmonary infiltrates per chest x-ray    I have discussed this patient's plan of care and discharge plan at IDT rounds today with Case Management, Nursing, Nursing leadership, and other members of the IDT team.    Consultants/Specialty  critical care, infectious disease, and nephrology    Code Status  Full Code    Disposition  Patient is not medically cleared for discharge.   Anticipate discharge to to home with close outpatient follow-up.  I have placed the appropriate orders for post-discharge needs.    Review of Systems  Review of Systems   Constitutional:  Positive for chills, fever and malaise/fatigue.   HENT: Negative.     Eyes: Negative.    Respiratory:  Positive for cough, sputum production and shortness of breath.    Cardiovascular: Negative.  Negative for chest pain and palpitations.   Gastrointestinal: Negative.  Negative for  heartburn, nausea and vomiting.   Genitourinary: Negative.  Negative for dysuria and frequency.   Musculoskeletal: Negative.  Negative for back pain and neck pain.   Skin: Negative.  Negative for itching and rash.   Neurological: Negative.  Negative for dizziness, focal weakness, weakness and headaches.   Endo/Heme/Allergies: Negative.  Negative for polydipsia. Does not bruise/bleed easily.   Psychiatric/Behavioral:  Negative for depression. The patient is nervous/anxious.       Physical Exam  Temp:  [36.9 °C (98.4 °F)-38.1 °C (100.6 °F)] 36.9 °C (98.4 °F)  Pulse:  [56-72] 60  Resp:  [15-36] 18  BP: (137-168)/(56-72) 142/56  SpO2:  [89 %-99 %] 99 %    Physical Exam  Vitals and nursing note reviewed.   Constitutional:       Appearance: She is well-developed. She is ill-appearing. She is not diaphoretic.   HENT:      Head: Normocephalic and atraumatic.      Nose: Nose normal.   Eyes:      Conjunctiva/sclera: Conjunctivae normal.      Pupils: Pupils are equal, round, and reactive to light.   Neck:      Thyroid: No thyromegaly.      Vascular: No JVD.   Cardiovascular:      Rate and Rhythm: Normal rate and regular rhythm.      Heart sounds: Normal heart sounds.     No friction rub. No gallop.   Pulmonary:      Effort: Pulmonary effort is normal.      Breath sounds: Rhonchi and rales present. No wheezing.   Abdominal:      General: Bowel sounds are normal. There is no distension.      Palpations: Abdomen is soft. There is no mass.      Tenderness: There is no abdominal tenderness. There is no guarding or rebound.   Musculoskeletal:         General: No tenderness. Normal range of motion.      Cervical back: Normal range of motion and neck supple.   Lymphadenopathy:      Cervical: No cervical adenopathy.   Skin:     General: Skin is warm and dry.   Neurological:      Mental Status: She is alert and oriented to person, place, and time.      Cranial Nerves: No cranial nerve deficit.   Psychiatric:         Behavior: Behavior  normal.       Fluids    Intake/Output Summary (Last 24 hours) at 4/2/2023 0745  Last data filed at 4/2/2023 0600  Gross per 24 hour   Intake 228.27 ml   Output 2550 ml   Net -2321.73 ml         Laboratory  Recent Labs     03/31/23 1707 04/01/23  0028 04/02/23  0230   WBC 7.3 7.1 5.2   RBC 3.53* 3.35* 2.49*   HEMOGLOBIN 10.1* 9.6* 7.2*   HEMATOCRIT 32.2* 30.9* 22.3*   MCV 91.2 92.2 89.6   MCH 28.6 28.7 28.9   MCHC 31.4* 31.1* 32.3*   RDW 42.5 44.0 42.5   PLATELETCT 95* 92* 72*   MPV 11.3 12.1 12.5       Recent Labs     03/31/23 1707 04/01/23  0028 04/02/23  0230   SODIUM 138 138 138   POTASSIUM 4.1 4.4 4.4   CHLORIDE 106 107 106   CO2 18* 16* 21   GLUCOSE 147* 165* 118*   BUN 24* 22 32*   CREATININE 1.50* 1.51* 1.87*   CALCIUM 9.8 9.1 8.7                     Imaging  DX-CHEST-PORTABLE (1 VIEW)   Final Result      1.  Decreased pulmonary opacities, probably improved pulmonary edema   2.  Persistently enlarged cardiac silhouette      DX-CHEST-PORTABLE (1 VIEW)   Final Result      No significant interval change.      DX-CHEST-LIMITED (1 VIEW)   Final Result         Airspace opacities throughout the right lung, similar to prior.      DX-CHEST-PORTABLE (1 VIEW)   Final Result      1.  Moderate cardiomegaly.   2.  Central perivascular and interstitial pulmonary edema asymmetrically greater on the right with patchy airspace opacities. Superimposed pneumonia is not excluded.      EC-ECHOCARDIOGRAM COMPLETE W/O CONT    (Results Pending)   US-EXTREMITY VENOUS LOWER BILAT    (Results Pending)          Assessment/Plan  * Pneumonia due to infectious organism- (present on admission)  Assessment & Plan  Patient presumed has pulmonary infection based on subjective chills, fever, dry cough and shortness of breath  She will be treated with empiric antibiotics.    Given immunocompromise state, will choose cefepime for resistant gram-negative fluids, azithromycin for atypicals  COVID-19/influenza/RSV negative.  Obtain sputum culture  if able  Infectious disease consult  The patient with positive BK virus titers, should typically not affect her lung function    Permanent atrial fibrillation (HCC)  Assessment & Plan  Apparently recent development, has a monitor on currently, has been followed by cardiology  Ongoing anticoagulation  Beta-blockade for rate control      CKD (chronic kidney disease), stage III (HCC)  Assessment & Plan  CKD stage III of transplanted kidney  Avoid nephrotoxins  Nephrology consultation  Continue immunosuppression    Hypertensive urgency  Assessment & Plan  Continue home medications amlodipine, losartan  IV Lasix for volume reduction  Nitroglycerin drip as needed  Monitor blood pressure  .    Kidney transplant recipient  Assessment & Plan  The patient appears to be at baseline with her creatinine  Continue prednisone, tacrolimus, CellCept  Avoid nephrotoxins  Bicarb for acidosis    Acute respiratory failure with hypoxia due to volume overload, possible pneumonia (HCC)  Assessment & Plan  Patient with worsening oxygenation since admission, currently on a high flow nasal cannula oxygen, 35 L, 80%  Etiology includes pulmonary edema and community-acquired pneumonia.  Patient is on apixaban, therefore PE seems to be less likely.    Treatment for presumed possible heart failure, volume overload as well as pneumonia  Significantly hypertensive, adjusting medication regimen, volume control  Continue empiric antibiotics for pneumonia  Monitor input and output and daily weight.   Currently with acute respiratory failure, requiring high flow nasal cannula, critically ill    Chest pain- (present on admission)  Assessment & Plan  Probably angina secondary to uncontrolled blood pressure.  Patient denies worsening of chest pain on deep inspiration or with cough.  EKG has not changed, troponin is in indeterminate range 27.  Patient is on anticoagulation with apixaban.  Continue current volume control, blood pressure control  Monitor on  telemetry, repeat troponin  Obtain transthoracic echo on follow-up        Acquired hypothyroidism- (present on admission)  Assessment & Plan  Continue levothyroxine    Chronic heart failure with preserved ejection fraction (HCC)- (present on admission)  Assessment & Plan  History of ejection fraction 55%, follow-up on cardiac function by echocardiogram  Continue diuresis for now  Blood pressure control    Diabetes (HCC)  Assessment & Plan  Type 2 diabetes   Will place on insulin sliding scale.  Most recent A1c 6.0.       Plan  Follow-up H&H, peripheral draw  PPI for now, rule out GI bleed  Holding diuresis currently  Nephrology consultation, renal transplant in 10 of/22 at [facility] [place]  Infectious disease consultation shaded  Follow cultures closely, follow-up culture from sputum collection  Ongoing anticoagulation caution  Ongoing rate control  Blood pressure control to optimize  Balance electrolytes closely  Continue immunosuppression  See orders  Medically complex high-risk patient  Critically ill with high oxygen requirement currently high flow nasal cannula at 35 L, 70%  Time in a critical care fashion 32 minutes without procedures      VTE prophylaxis: therapeutic anticoagulation with apixaban    I have performed a physical exam and reviewed and updated ROS and Plan today (4/2/2023). In review of yesterday's note (4/1/2023), there are no changes except as documented above.      Please note that this dictation was created using voice recognition software. I have made every reasonable attempt to correct obvious errors, but I expect that there are errors of grammar and possibly context that I did not discover before finalizing the note.

## 2023-04-02 NOTE — PROGRESS NOTES
Nephrology Daily Progress Note    Date of Service  4/2/2023    Chief Complaint  72 y.o. female with [place] [facility] [place]31/2023 with PNA    Interval Problem Update  4/2 -doing better  No acute events  No new complaints  SOB/cough better  Creat slightly worse from baseline - holding lasix  Leukopenia better  -restarted CellCept    Review of Systems  Review of Systems   Constitutional:  Negative for chills, fever, malaise/fatigue and weight loss.   HENT:  Negative for congestion, hearing loss and sinus pain.    Eyes: Negative.    Respiratory:  Positive for cough and shortness of breath. Negative for hemoptysis and wheezing.    Cardiovascular:  Negative for chest pain, palpitations and leg swelling.   Gastrointestinal:  Negative for abdominal pain, nausea and vomiting.   Genitourinary:  Negative for dysuria, flank pain, frequency, hematuria and urgency.   Skin: Negative.    All other systems reviewed and are negative.     Physical Exam  Temp:  [36.9 °C (98.4 °F)-38.1 °C (100.6 °F)] 36.9 °C (98.4 °F)  Pulse:  [56-72] 60  Resp:  [15-36] 18  BP: (137-168)/(56-72) 142/56  SpO2:  [89 %-99 %] 99 %    Physical Exam  Vitals reviewed.   Constitutional:       General: She is not in acute distress.     Appearance: Normal appearance. She is well-developed. She is not diaphoretic.   HENT:      Head: Normocephalic and atraumatic.      Nose: Nose normal.      Mouth/Throat:      Mouth: Mucous membranes are moist.      Pharynx: Oropharynx is clear.   Eyes:      Extraocular Movements: Extraocular movements intact.      Conjunctiva/sclera: Conjunctivae normal.      Pupils: Pupils are equal, round, and reactive to light.   Cardiovascular:      Rate and Rhythm: Normal rate and regular rhythm.      Pulses: Normal pulses.      Heart sounds: Normal heart sounds.   Pulmonary:      Effort: Pulmonary effort is normal. No respiratory distress.      Breath sounds: Normal breath sounds. No wheezing or rales.   Abdominal:       General: Bowel sounds are normal. There is no distension.      Palpations: Abdomen is soft. There is no mass.      Tenderness: There is no abdominal tenderness. There is no right CVA tenderness or left CVA tenderness.   Musculoskeletal:      Cervical back: Normal range of motion and neck supple.      Right lower leg: No edema.      Left lower leg: No edema.   Skin:     General: Skin is warm.      Coloration: Skin is not pale.      Findings: No erythema or rash.   Neurological:      General: No focal deficit present.      Mental Status: She is alert and oriented to person, place, and time.      Cranial Nerves: No cranial nerve deficit.      Coordination: Coordination normal.   Psychiatric:         Mood and Affect: Mood normal.         Behavior: Behavior normal.         Thought Content: Thought content normal.         Judgment: Judgment normal.       Fluids    Intake/Output Summary (Last 24 hours) at 4/2/2023 1046  Last data filed at 4/2/2023 0600  Gross per 24 hour   Intake 83.92 ml   Output 2550 ml   Net -2466.08 ml       Laboratory  Recent Labs     03/31/23 1707 04/01/23 0028 04/02/23  0230   WBC 7.3 7.1 5.2   RBC 3.53* 3.35* 2.49*   HEMOGLOBIN 10.1* 9.6* 7.2*   HEMATOCRIT 32.2* 30.9* 22.3*   MCV 91.2 92.2 89.6   MCH 28.6 28.7 28.9   MCHC 31.4* 31.1* 32.3*   RDW 42.5 44.0 42.5   PLATELETCT 95* 92* 72*   MPV 11.3 12.1 12.5     Recent Labs     03/31/23 1707 04/01/23  0028 04/02/23  0230   SODIUM 138 138 138   POTASSIUM 4.1 4.4 4.4   CHLORIDE 106 107 106   CO2 18* 16* 21   GLUCOSE 147* 165* 118*   BUN 24* 22 32*   CREATININE 1.50* 1.51* 1.87*   CALCIUM 9.8 9.1 8.7         Recent Labs     04/01/23  0028 04/02/23  0230   NTPROBNP 3985* 3820*           Imaging  US-EXTREMITY VENOUS LOWER BILAT   Final Result      DX-CHEST-PORTABLE (1 VIEW)   Final Result      1.  Decreased pulmonary opacities, probably improved pulmonary edema   2.  Persistently enlarged cardiac silhouette      DX-CHEST-PORTABLE (1 VIEW)   Final  Result      No significant interval change.      DX-CHEST-LIMITED (1 VIEW)   Final Result         Airspace opacities throughout the right lung, similar to prior.      DX-CHEST-PORTABLE (1 VIEW)   Final Result      1.  Moderate cardiomegaly.   2.  Central perivascular and interstitial pulmonary edema asymmetrically greater on the right with patchy airspace opacities. Superimposed pneumonia is not excluded.      EC-ECHOCARDIOGRAM COMPLETE W/O CONT    (Results Pending)   CT-CHEST (THORAX) W/O    (Results Pending)        Assessment/Plan    The patient is a very pleasant 72-year-old female with   renal transplant from  donor, chronic kidney disease stage IIIB, admitted with pneumonia, hypoxia    1.  Chronic kidney disease, stage IIIB, creatinine level slightly worse -holding lasix  Continue to monitor closely.     2. Renal transplant recipient.  Continue immunosuppression with tacrolimus, Cellcept and prednisone.   Monitor tacrolimus level.    3.  Hypertension.  Blood pressure remains well controlled.    4  Anemia, noticed drop in hemoglobin level, to monitor closely.    5.  Metabolic acidosis:corrected -continue Na HCO3    6.  BK virus (+) serum, urine -d/w transplant center    RECOMMENDATIONS:  Continue current treatment.    Keep well hydrated.    Monitor hemoglobin level, basic metabolic panel.    Hold lasix  Monitor Prograf level.    Avoid nephrotoxic agents like IV contrast,   nonsteroidals.  low-sodium diet.  We will follow the patient closely

## 2023-04-02 NOTE — CARE PLAN
The patient is Watcher - Medium risk of patient condition declining or worsening    Shift Goals  Clinical Goals: Decrease  O2 demand  Patient Goals: Decreased O2 demand, Rest  Family Goals: TEE    Problem: Care Map:  Admission Optimal Outcome for the Heart Failure Patient  Goal: Admission:  Optimal Care of the heart failure patient  Outcome: Progressing     Problem: Care Map:  Day 1 Optimal Outcome for the Heart Failure Patient  Goal: Day 1:  Optimal Care of the heart failure patient  Outcome: Progressing     Problem: Care Map:  Day 2 Optimal Outcome for the Heart Failure Patient  Goal: Day 2:  Optimal Care of the heart failure patient  Outcome: Progressing     Problem: Care Map:  Day 3 Optimal Outcome for the Heart Failure Patient  Goal: Day 3:  Optimal Care of the heart failure patient  Outcome: Progressing     Problem: Care Map:  Day Before Discharge Optimal Outcome for the Heart Failure Patient  Goal: Day Before Discharge:  Optimal Care of the heart failure patient  Outcome: Progressing     Problem: Care Map:  Day of Discharge Optimal Outcome for the Heart Failure Patient  Goal: Day of Discharge:  Optimal Care of the heart failure patient  Outcome: Progressing     Problem: Care Map:  Optimal Outcome for the Pneumonia Patient  Goal: Collection and monitoring of appropriate tests and labs  Outcome: Progressing     Problem: Respiratory  Goal: Patient will achieve/maintain optimum respiratory ventilation and gas exchange  Outcome: Progressing     Problem: Risk for Aspiration  Goal: Patient's risk for aspiration will be absent or decrease  Outcome: Progressing     Problem: Hemodynamics - Pneumonia  Goal: Patient's hemodynamics, fluid balance and neurologic status will be stable or improve  Outcome: Progressing     Problem: Self Care  Goal: Patient will have the ability to perform ADLs independently or with assistance (bathe, groom, dress, toilet and feed)  Outcome: Progressing     Problem: Discharge Planning -  Pneumonia  Goal: Patient will verbalize understanding of lifestyle changes and therapeutic needs post discharge  Outcome: Progressing     Problem: Knowledge Deficit - Standard  Goal: Patient and family/care givers will demonstrate understanding of plan of care, disease process/condition, diagnostic tests and medications  Outcome: Progressing     Problem: Pain - Standard  Goal: Alleviation of pain or a reduction in pain to the patient’s comfort goal  Outcome: Progressing     Problem: Skin Integrity  Goal: Skin integrity is maintained or improved  Outcome: Progressing     Problem: Fall Risk  Goal: Patient will remain free from falls  Outcome: Progressing

## 2023-04-02 NOTE — ASSESSMENT & PLAN NOTE
Significant drop since admission despite diuretics, recheck, rule out GI bleed  Hgb mid 7s  Pt is guaiac + though microscopic; no melena or BRBPR.  Pt has been having some hemoptysis and epistaxis however  Has had colonsocopies in the past which were reportedly unremarkable though pt can't tell me when the last one was    4/6  Hgb stable  Suspect guaiac + was related to hemoptysis and epistaxis  re-challenge with apixaban  Follow CBC

## 2023-04-02 NOTE — CONSULTS
DATE OF SERVICE:  2023     NEPHROLOGY CONSULTATION     Consult at the request of Max Robles MD     REASON FOR CONSULTATION:  To evaluate patient with chronic kidney disease   stage IIIB post-renal transplant.     HISTORY OF PRESENT ILLNESS:  The patient is a 72-year-old female with history   of renal  donor renal transplant in 10/2022 in [facility].    Initially, the patient required temporary dialysis due to delayed graft   function, recovered.  Currently, baseline creatinine level at 1.4-1.6 stable.    The patient presented to the hospital with worsening shortness of breath,   chest pain, diagnosed with pneumonia.  Upon lab tests reviewed, noticed   positive results for BK virus in urine and serum in 2023.    The patient is doing better, less shortness of breath, still required high   oxygen supplementation, less cough, no fever or chills.  No abdominal pain, no   nausea or vomiting.  No difficulties to urinate.  No dysuria, hematuria or   graft pain.     REVIEW OF SYSTEMS:  GENERAL:  Positive for fatigue.  No fever or chills.  HEENT:  No nosebleeds, no sore throat, no sinus pain.  EYES:  No double or blurry vision.  NECK:  No pain, no stiffness.  RESPIRATORY:  Positive for cough, shortness of breath, no hemoptysis.  CARDIOVASCULAR:  No dyspnea, no chest pain or palpitation.  GASTROINTESTINAL:  No abdominal pain, no nausea, vomiting, diarrhea.  GENITOURINARY:  No dysuria, hematuria, graft pain.     PAST MEDICAL HISTORY:  End-stage renal disease, status post renal transplant,   hypothyroidism, diastolic heart failure, coronary artery disease,   hypertension, hyperlipidemia.     PAST SURGICAL HISTORY:  Abdominal surgery, cardiac catheterization, renal   transplant.     FAMILY HISTORY:  Diabetes mellitus type 2.  No kidney disease.     SOCIAL HISTORY:  No tobacco, alcohol or drug use.     ALLERGIES:  No known drug allergies.     OUTPATIENT MEDICATIONS:  Reviewed.      PHYSICAL EXAMINATION:  VITAL SIGNS:  Blood pressure 137/59, temperature 37.8 Celsius.  GENERAL APPEARANCE:  Well-developed, well-nourished female in no acute   distress.  HEENT:  Normocephalic, atraumatic.  Pupils equal, round, reactive to light.    Extraocular movement intact.  Nares patent.  Oropharynx clear, moist mucosa,   no erythema or exudate.  NECK:  Supple.  No lymphadenopathy.  No thyromegaly appreciated.  LUNGS:  Coarse breath sounds bilaterally, scattered rhonchi.  HEART:  Regular rhythm.  No rub or gallop.  ABDOMEN:  Soft, slightly distended, nontender to palpation.  Bowel sounds   present.  No graft tenderness to palpation.  EXTREMITIES:  No cyanosis, clubbing, no edema.  SKIN:  Warm, dry.  No erythema or rash.  NEUROLOGIC:  Alert, oriented x3, no focal deficit.  Cranial nerves II-XII   grossly intact.     LABORATORY DATA:  Laboratory results reviewed, revealed hemoglobin 9.6.    Sodium 138, potassium 4.4, CO2 of 16, BUN 22 and creatinine 1.51.     ASSESSMENT AND PLAN:  The patient is a very pleasant 72-year-old female with   renal transplant from  donor, chronic kidney disease stage IIIB,   admitted with pneumonia.  1.  Chronic kidney disease, stage IIIB, creatinine stable at baseline.    Continue to monitor closely.     Renal transplant recipient.  Continue immunosuppression with tacrolimus and   prednisone.     Monitor tacrolimus level.  2.  Hypertension.  Blood pressure remains well controlled.  3.  Anemia, noticed drop in hemoglobin level, to monitor closely.  4.  Metabolic acidosis.  Continue bicarbonate drip but increase dose.     RECOMMENDATIONS:  1.  Continue current treatment.  Keep well hydrated.  Monitor hemoglobin   level, basic metabolic panel.  Increase sodium bicarbonate to 1300 3 times a   day.  Monitor Prograf level.  Avoid nephrotoxic agents like IV contrast,   nonsteroidals.  2.  To provide low-sodium diet.  3.  We will follow the patient closely.     Thank you for the  consult.        ______________________________  MD JANE SAWYER    DD:  04/01/2023 17:13  DT:  04/01/2023 21:35    Job#:  [id number]

## 2023-04-03 ENCOUNTER — APPOINTMENT (OUTPATIENT)
Dept: RADIOLOGY | Facility: MEDICAL CENTER | Age: 73
DRG: 193 | End: 2023-04-03
Attending: HOSPITALIST
Payer: MEDICARE

## 2023-04-03 ENCOUNTER — APPOINTMENT (OUTPATIENT)
Dept: CARDIOLOGY | Facility: MEDICAL CENTER | Age: 73
DRG: 193 | End: 2023-04-03
Attending: INTERNAL MEDICINE
Payer: MEDICARE

## 2023-04-03 LAB
ALBUMIN SERPL BCP-MCNC: 3.3 G/DL (ref 3.2–4.9)
ALBUMIN/GLOB SERPL: 1.4 G/DL
ALP SERPL-CCNC: 61 U/L (ref 30–99)
ALT SERPL-CCNC: 19 U/L (ref 2–50)
ANION GAP SERPL CALC-SCNC: 10 MMOL/L (ref 7–16)
AST SERPL-CCNC: 16 U/L (ref 12–45)
BASOPHILS # BLD AUTO: 0.2 % (ref 0–1.8)
BASOPHILS # BLD: 0.01 K/UL (ref 0–0.12)
BILIRUB SERPL-MCNC: 0.9 MG/DL (ref 0.1–1.5)
BUN SERPL-MCNC: 31 MG/DL (ref 8–22)
CALCIUM ALBUM COR SERPL-MCNC: 9.4 MG/DL (ref 8.5–10.5)
CALCIUM SERPL-MCNC: 8.8 MG/DL (ref 8.5–10.5)
CHLORIDE SERPL-SCNC: 105 MMOL/L (ref 96–112)
CO2 SERPL-SCNC: 21 MMOL/L (ref 20–33)
CREAT SERPL-MCNC: 1.74 MG/DL (ref 0.5–1.4)
EOSINOPHIL # BLD AUTO: 0.05 K/UL (ref 0–0.51)
EOSINOPHIL NFR BLD: 1.1 % (ref 0–6.9)
ERYTHROCYTE [DISTWIDTH] IN BLOOD BY AUTOMATED COUNT: 41.8 FL (ref 35.9–50)
FIBRINOGEN PPP-MCNC: 609 MG/DL (ref 215–460)
GFR SERPLBLD CREATININE-BSD FMLA CKD-EPI: 31 ML/MIN/1.73 M 2
GLOBULIN SER CALC-MCNC: 2.4 G/DL (ref 1.9–3.5)
GLUCOSE BLD STRIP.AUTO-MCNC: 125 MG/DL (ref 65–99)
GLUCOSE BLD STRIP.AUTO-MCNC: 147 MG/DL (ref 65–99)
GLUCOSE BLD STRIP.AUTO-MCNC: 182 MG/DL (ref 65–99)
GLUCOSE BLD STRIP.AUTO-MCNC: 247 MG/DL (ref 65–99)
GLUCOSE SERPL-MCNC: 112 MG/DL (ref 65–99)
HCT VFR BLD AUTO: 23.7 % (ref 37–47)
HCT VFR BLD AUTO: 26.3 % (ref 37–47)
HGB BLD-MCNC: 7.4 G/DL (ref 12–16)
HGB BLD-MCNC: 8 G/DL (ref 12–16)
IMM GRANULOCYTES # BLD AUTO: 0.02 K/UL (ref 0–0.11)
IMM GRANULOCYTES NFR BLD AUTO: 0.5 % (ref 0–0.9)
LDH SERPL L TO P-CCNC: 257 U/L (ref 107–266)
LYMPHOCYTES # BLD AUTO: 0.22 K/UL (ref 1–4.8)
LYMPHOCYTES NFR BLD: 5 % (ref 22–41)
MAGNESIUM SERPL-MCNC: 1.6 MG/DL (ref 1.5–2.5)
MCH RBC QN AUTO: 28.4 PG (ref 27–33)
MCHC RBC AUTO-ENTMCNC: 31.2 G/DL (ref 33.6–35)
MCV RBC AUTO: 90.8 FL (ref 81.4–97.8)
MONOCYTES # BLD AUTO: 0.28 K/UL (ref 0–0.85)
MONOCYTES NFR BLD AUTO: 6.4 % (ref 0–13.4)
NEUTROPHILS # BLD AUTO: 3.82 K/UL (ref 2–7.15)
NEUTROPHILS NFR BLD: 86.8 % (ref 44–72)
NRBC # BLD AUTO: 0 K/UL
NRBC BLD-RTO: 0 /100 WBC
PHOSPHATE SERPL-MCNC: 2.6 MG/DL (ref 2.5–4.5)
PLATELET # BLD AUTO: 93 K/UL (ref 164–446)
PMV BLD AUTO: 12.4 FL (ref 9–12.9)
POTASSIUM SERPL-SCNC: 4.4 MMOL/L (ref 3.6–5.5)
PROT SERPL-MCNC: 5.7 G/DL (ref 6–8.2)
RBC # BLD AUTO: 2.61 M/UL (ref 4.2–5.4)
SODIUM SERPL-SCNC: 136 MMOL/L (ref 135–145)
WBC # BLD AUTO: 4.4 K/UL (ref 4.8–10.8)

## 2023-04-03 PROCEDURE — 83615 LACTATE (LD) (LDH) ENZYME: CPT

## 2023-04-03 PROCEDURE — 99233 SBSQ HOSP IP/OBS HIGH 50: CPT | Performed by: INTERNAL MEDICINE

## 2023-04-03 PROCEDURE — 700111 HCHG RX REV CODE 636 W/ 250 OVERRIDE (IP): Performed by: HOSPITALIST

## 2023-04-03 PROCEDURE — 700102 HCHG RX REV CODE 250 W/ 637 OVERRIDE(OP): Performed by: INTERNAL MEDICINE

## 2023-04-03 PROCEDURE — 85384 FIBRINOGEN ACTIVITY: CPT

## 2023-04-03 PROCEDURE — 94669 MECHANICAL CHEST WALL OSCILL: CPT

## 2023-04-03 PROCEDURE — 770000 HCHG ROOM/CARE - INTERMEDIATE ICU *

## 2023-04-03 PROCEDURE — 71250 CT THORAX DX C-: CPT

## 2023-04-03 PROCEDURE — 83010 ASSAY OF HAPTOGLOBIN QUANT: CPT

## 2023-04-03 PROCEDURE — 99232 SBSQ HOSP IP/OBS MODERATE 35: CPT | Performed by: INTERNAL MEDICINE

## 2023-04-03 PROCEDURE — A9270 NON-COVERED ITEM OR SERVICE: HCPCS | Performed by: HOSPITALIST

## 2023-04-03 PROCEDURE — 80053 COMPREHEN METABOLIC PANEL: CPT

## 2023-04-03 PROCEDURE — A9270 NON-COVERED ITEM OR SERVICE: HCPCS | Performed by: INTERNAL MEDICINE

## 2023-04-03 PROCEDURE — 700111 HCHG RX REV CODE 636 W/ 250 OVERRIDE (IP): Performed by: INTERNAL MEDICINE

## 2023-04-03 PROCEDURE — 84100 ASSAY OF PHOSPHORUS: CPT

## 2023-04-03 PROCEDURE — 85018 HEMOGLOBIN: CPT

## 2023-04-03 PROCEDURE — 85025 COMPLETE CBC W/AUTO DIFF WBC: CPT

## 2023-04-03 PROCEDURE — 82962 GLUCOSE BLOOD TEST: CPT | Mod: 91

## 2023-04-03 PROCEDURE — 99233 SBSQ HOSP IP/OBS HIGH 50: CPT | Performed by: HOSPITALIST

## 2023-04-03 PROCEDURE — 85014 HEMATOCRIT: CPT

## 2023-04-03 PROCEDURE — 83735 ASSAY OF MAGNESIUM: CPT

## 2023-04-03 PROCEDURE — 93306 TTE W/DOPPLER COMPLETE: CPT

## 2023-04-03 PROCEDURE — 94640 AIRWAY INHALATION TREATMENT: CPT

## 2023-04-03 PROCEDURE — 700102 HCHG RX REV CODE 250 W/ 637 OVERRIDE(OP): Performed by: HOSPITALIST

## 2023-04-03 PROCEDURE — 700105 HCHG RX REV CODE 258: Performed by: INTERNAL MEDICINE

## 2023-04-03 RX ORDER — MYCOPHENOLATE MOFETIL 250 MG/1
500 CAPSULE ORAL DAILY
Status: DISCONTINUED | OUTPATIENT
Start: 2023-04-04 | End: 2023-04-08 | Stop reason: HOSPADM

## 2023-04-03 RX ADMIN — ATORVASTATIN CALCIUM 20 MG: 20 TABLET, FILM COATED ORAL at 21:19

## 2023-04-03 RX ADMIN — INSULIN HUMAN 1 UNITS: 100 INJECTION, SOLUTION PARENTERAL at 17:18

## 2023-04-03 RX ADMIN — INSULIN HUMAN 2 UNITS: 100 INJECTION, SOLUTION PARENTERAL at 14:10

## 2023-04-03 RX ADMIN — AMLODIPINE BESYLATE 10 MG: 10 TABLET ORAL at 04:57

## 2023-04-03 RX ADMIN — APIXABAN 5 MG: 5 TABLET, FILM COATED ORAL at 17:29

## 2023-04-03 RX ADMIN — SODIUM BICARBONATE 1300 MG: 650 TABLET ORAL at 21:20

## 2023-04-03 RX ADMIN — SODIUM BICARBONATE 1300 MG: 650 TABLET ORAL at 08:11

## 2023-04-03 RX ADMIN — TACROLIMUS 2 MG: 1 CAPSULE ORAL at 17:30

## 2023-04-03 RX ADMIN — POTASSIUM CHLORIDE 40 MEQ: 1500 TABLET, EXTENDED RELEASE ORAL at 04:57

## 2023-04-03 RX ADMIN — SODIUM BICARBONATE 1300 MG: 650 TABLET ORAL at 14:14

## 2023-04-03 RX ADMIN — LOSARTAN POTASSIUM 100 MG: 50 TABLET, FILM COATED ORAL at 17:29

## 2023-04-03 RX ADMIN — TACROLIMUS 1 MG: 1 CAPSULE ORAL at 04:58

## 2023-04-03 RX ADMIN — OMEPRAZOLE 20 MG: 20 CAPSULE, DELAYED RELEASE ORAL at 04:57

## 2023-04-03 RX ADMIN — MYCOPHENOLATE MOFETIL 500 MG: 250 CAPSULE ORAL at 04:57

## 2023-04-03 RX ADMIN — DOCUSATE SODIUM 50 MG AND SENNOSIDES 8.6 MG 2 TABLET: 8.6; 5 TABLET, FILM COATED ORAL at 04:57

## 2023-04-03 RX ADMIN — PREDNISONE 5 MG: 10 TABLET ORAL at 04:57

## 2023-04-03 RX ADMIN — METOPROLOL SUCCINATE 25 MG: 25 TABLET, EXTENDED RELEASE ORAL at 04:57

## 2023-04-03 RX ADMIN — CEFEPIME 1 G: 1 INJECTION, POWDER, FOR SOLUTION INTRAMUSCULAR; INTRAVENOUS at 23:58

## 2023-04-03 ASSESSMENT — PAIN DESCRIPTION - PAIN TYPE
TYPE: ACUTE PAIN

## 2023-04-03 ASSESSMENT — FIBROSIS 4 INDEX: FIB4 SCORE: 2.88

## 2023-04-03 ASSESSMENT — ENCOUNTER SYMPTOMS
HEARTBURN: 0
FOCAL WEAKNESS: 0
POLYDIPSIA: 0
PALPITATIONS: 0
BACK PAIN: 0
BRUISES/BLEEDS EASILY: 0
EYES NEGATIVE: 1
COUGH: 1
DEPRESSION: 0
SHORTNESS OF BREATH: 1
HEADACHES: 0
FEVER: 0
WEAKNESS: 0
DIZZINESS: 0
CHILLS: 0
NEUROLOGICAL NEGATIVE: 1
NECK PAIN: 0
GASTROINTESTINAL NEGATIVE: 1
NAUSEA: 0
SPUTUM PRODUCTION: 1
CARDIOVASCULAR NEGATIVE: 1
VOMITING: 0
CHILLS: 1
FEVER: 1
COUGH: 0
NERVOUS/ANXIOUS: 1
MUSCULOSKELETAL NEGATIVE: 1

## 2023-04-03 NOTE — PROGRESS NOTES
Nephrology Daily Progress Note    Date of Service  4/3/2023    Chief Complaint  72 y.o. female with ESRD, [place] [facility] [place]23 with PNA    Interval Problem Update  4/2 -doing better  No acute events  No new complaints  SOB/cough better  Creat slightly worse from baseline - holding lasix  Leukopenia better  -restarted CellCept  4/3 patient is Slovenian-speaking lady, communication was through   Still short of breath, no chest pain.  Review of Systems  Review of Systems   Constitutional:  Positive for malaise/fatigue. Negative for chills and fever.   Respiratory:  Positive for shortness of breath. Negative for cough.    Cardiovascular:  Negative for chest pain and leg swelling.   Gastrointestinal:  Negative for nausea and vomiting.   Genitourinary:  Negative for dysuria, frequency and urgency.   All other systems reviewed and are negative.     Physical Exam  Pulse:  [53-66] 66  Resp:  [10-33] 28  BP: (111-166)/(49-72) 111/49  SpO2:  [91 %-98 %] 97 %    Physical Exam  Vitals and nursing note reviewed.   Constitutional:       General: She is awake. She is not in acute distress.     Appearance: She is well-developed. She is ill-appearing. She is not diaphoretic.      Interventions: Face mask in place.   HENT:      Head: Normocephalic and atraumatic.      Right Ear: External ear normal.      Left Ear: External ear normal.      Nose: Nose normal. No rhinorrhea.      Mouth/Throat:      Pharynx: No oropharyngeal exudate or posterior oropharyngeal erythema.   Eyes:      General: No scleral icterus.        Right eye: No discharge.         Left eye: No discharge.      Conjunctiva/sclera: Conjunctivae normal.   Neck:      Vascular: No carotid bruit.   Cardiovascular:      Rate and Rhythm: Normal rate and regular rhythm.      Heart sounds: No murmur heard.  Pulmonary:      Effort: Pulmonary effort is normal. No respiratory distress.      Breath sounds: Normal breath sounds.   Abdominal:       General: Abdomen is flat. There is no distension.      Palpations: Abdomen is soft. There is no mass.   Musculoskeletal:         General: No tenderness.      Cervical back: No rigidity. No muscular tenderness.      Right lower leg: No edema.      Left lower leg: No edema.   Skin:     General: Skin is warm and dry.      Coloration: Skin is not jaundiced.   Neurological:      General: No focal deficit present.      Mental Status: She is alert and oriented to person, place, and time. Mental status is at baseline.   Psychiatric:         Mood and Affect: Mood normal.         Behavior: Behavior normal.         Thought Content: Thought content normal.       Fluids    Intake/Output Summary (Last 24 hours) at 4/3/2023 1335  Last data filed at 4/3/2023 1000  Gross per 24 hour   Intake 240 ml   Output 1600 ml   Net -1360 ml         Laboratory  Recent Labs     04/01/23  0028 04/02/23  0230 04/02/23  1352 04/02/23  1723 04/03/23  0135 04/03/23  0910   WBC 7.1 5.2  --   --  4.4*  --    RBC 3.35* 2.49*  --   --  2.61*  --    HEMOGLOBIN 9.6* 7.2*   < > 8.0* 7.4* 8.0*   HEMATOCRIT 30.9* 22.3*   < > 25.1* 23.7* 26.3*   MCV 92.2 89.6  --   --  90.8  --    MCH 28.7 28.9  --   --  28.4  --    MCHC 31.1* 32.3*  --   --  31.2*  --    RDW 44.0 42.5  --   --  41.8  --    PLATELETCT 92* 72*  --   --  93*  --    MPV 12.1 12.5  --   --  12.4  --     < > = values in this interval not displayed.       Recent Labs     04/01/23 0028 04/02/23  0230 04/03/23  0135   SODIUM 138 138 136   POTASSIUM 4.4 4.4 4.4   CHLORIDE 107 106 105   CO2 16* 21 21   GLUCOSE 165* 118* 112*   BUN 22 32* 31*   CREATININE 1.51* 1.87* 1.74*   CALCIUM 9.1 8.7 8.8           Recent Labs     04/01/23 0028 04/02/23  0230   NTPROBNP 3985* 3820*             Imaging  CT-CHEST (THORAX) W/O   Final Result      1.  Multifocal pneumonia.   2.  Trace right pleural effusion.   3.  Megaly.   4.  Atherosclerosis      Fleischner Society pulmonary nodule recommendations:   Not Applicable          US-EXTREMITY VENOUS LOWER BILAT   Final Result      DX-CHEST-PORTABLE (1 VIEW)   Final Result      1.  Decreased pulmonary opacities, probably improved pulmonary edema   2.  Persistently enlarged cardiac silhouette      DX-CHEST-PORTABLE (1 VIEW)   Final Result      No significant interval change.      DX-CHEST-LIMITED (1 VIEW)   Final Result         Airspace opacities throughout the right lung, similar to prior.      DX-CHEST-PORTABLE (1 VIEW)   Final Result      1.  Moderate cardiomegaly.   2.  Central perivascular and interstitial pulmonary edema asymmetrically greater on the right with patchy airspace opacities. Superimposed pneumonia is not excluded.      EC-ECHOCARDIOGRAM COMPLETE W/O CONT    (Results Pending)        Assessment/Plan    The patient is a very pleasant 72-year-old female with   renal transplant from  donor, chronic kidney disease stage IIIB, admitted with pneumonia, hypoxia    1.  SASHA on chronic kidney disease, stage IIIB: No clear etiology   2. Renal transplant recipient.  Continue immunosuppression.  3.  Hypertension.  Blood pressure remains well controlled.  4  Anemia.  5.  Metabolic acidosis.  6.  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  Renal dose all meds  Avoid nephrotoxins like NSAIDs.  Consider allograft biopsy  Consider antiviral treatment for BK virus  Plan discussed in detail with Dr. Robles

## 2023-04-03 NOTE — PROGRESS NOTES
Hospital Medicine Daily Progress Note    Date of Service  4/3/2023    Chief Complaint  Tere Gallegos is a 72 y.o. female admitted 3/31/2023 with dyspnea, fevers, shivering, chest pain    Hospital Course  Tere Gallegos is a 72 y.o. female with past medical history of kidney transplant in October 2022, CAD, NOEMI to RCA, paroxysmal A-fib, anticoagulation with Eliquis, renovascular hypertension, hypothyroidism, heart failure with preserved ejection fraction, type 2 diabetes, hypertension, restless leg syndrome, who presented 3/31/2023 with complaints of shortness of breath and chest pain.  Patient is experiencing shortness of breath at rest and on exertion since last night.  Today she started having dry cough, chills, subjective fever.  At 3 PM she started having constant dull sensation in the middle of the chest, radiating to the right shoulder.  In ER patient noted to be mildly hypertensive 189/79, desaturated to 87% on room air, was placed on 2 L nasal cannula, then on 4 and 6 L subsequently.  COVID-19/influenza/RSV screen is negative.  Blood work did not show any significant worsening of kidney function, but did show mild acidosis with bicarb of 18.  Chest x-ray showed pulmonary edema and patchy airspace opacities, which is consistent with pneumonia and/or pulmonary edema.  Lactic acid is not elevated.  Troponin is 26.  EKG showed sinus rhythm, heart rate 86, chronic T wave inversion in lateral leads.  The patient on telemetry found in atrial fibrillation, flutter, rate controlled    Interval Problem Update  Patient seen and examined today.  Data, Medication data reviewed.  Case discussed with nursing as available.  Plan of Care reviewed with patient and notified of changes.   4/1 the patient currently afebrile, heart rates in his 60s, respiration slightly tachypneic but not labored, the patient remains on high flow nasal cannula, 35 L, 80% FiO2, blood pressure in the 130s over 60s, restart  nitroglycerin for blood pressure control, currently no chest pain, mild cough, laboratory data with a white cell count of 7.1, hemoglobin 9.6, hematocrit 30.9, platelet count 92, chemistry with a sodium of 133, potassium 4.4, chloride 107, bicarb 16, glucose 165, BUN 22, creatinine 1.51, magnesium 1.4, lactic acid 1.4, tacrolimus level 2.2 on 3/30, troponin in the indeterminate range, 37, BNP 3985,  The patient had kidney transplant in October at [facility] [place]  Discussed with infectious disease, discussed with nephrology  4/2 the patient is improved, slightly improved oxygenation, currently afebrile, slight fever last night up to 100.6, heart rate in the 50s and 60s, atrial fibrillation, currently on 35 L, 70% FiO2, blood pressure 140s over 60s, decrease in hemoglobin, no definitive cause, the patient recently taken off CellCept secondary to leukopenia, renal function slightly worsened with a creatinine up to 1.87 on Lasix, respiratory viral panel ordered, negative so far, sputum culture negative, blood cultures remain negative so far, negative lower extremity ultrasound for DVT, decreased pulmonary infiltrates per chest x-ray  4/3 the patient feels better, she is down to 5 to 6 L per nasal cannula oxygen, she denies increasing dyspnea, currently afebrile, heart rate in the 60s, respiration unlabored, blood pressure with improved control, discussed with nephrology, concern for ongoing infection with BK virus, possibly affecting her transplanted kidney, decision to decrease CellCept  Considering a allograft biopsy, nephrology updated the transplant service, awaiting CT chest, MRSA negative, respiratory viral panel negative  No further drop in hemoglobin noted, no evidence of acute GI bleeding  Later CT chest returns with multifocal pneumonia, trace right pleural effusion  I have discussed this patient's plan of care and discharge plan at IDT rounds today with Case Management, Nursing, Nursing leadership, and other members of  the IDT team.    Consultants/Specialty  critical care, infectious disease, and nephrology  Transplant team contacted by the nephrologist    Code Status  Full Code    Disposition  Patient is not medically cleared for discharge.   Anticipate discharge to to home with close outpatient follow-up.  I have placed the appropriate orders for post-discharge needs.    Review of Systems  Review of Systems   Constitutional:  Positive for chills, fever and malaise/fatigue.   HENT: Negative.     Eyes: Negative.    Respiratory:  Positive for cough, sputum production and shortness of breath.    Cardiovascular: Negative.  Negative for chest pain and palpitations.   Gastrointestinal: Negative.  Negative for heartburn, nausea and vomiting.   Genitourinary: Negative.  Negative for dysuria and frequency.   Musculoskeletal: Negative.  Negative for back pain and neck pain.   Skin: Negative.  Negative for itching and rash.   Neurological: Negative.  Negative for dizziness, focal weakness, weakness and headaches.   Endo/Heme/Allergies: Negative.  Negative for polydipsia. Does not bruise/bleed easily.   Psychiatric/Behavioral:  Negative for depression. The patient is nervous/anxious.       Physical Exam  Pulse:  [53-65] 62  Resp:  [10-59] 16  BP: (125-166)/(51-72) 125/56  SpO2:  [91 %-98 %] 91 %    Physical Exam  Vitals and nursing note reviewed.   Constitutional:       Appearance: She is well-developed. She is ill-appearing. She is not diaphoretic.   HENT:      Head: Normocephalic and atraumatic.      Nose: Nose normal.   Eyes:      Conjunctiva/sclera: Conjunctivae normal.      Pupils: Pupils are equal, round, and reactive to light.   Neck:      Thyroid: No thyromegaly.      Vascular: No JVD.   Cardiovascular:      Rate and Rhythm: Normal rate and regular rhythm.      Heart sounds: Normal heart sounds.     No friction rub. No gallop.   Pulmonary:      Effort: Pulmonary effort is normal.      Breath sounds: Rhonchi and rales present. No  wheezing.   Abdominal:      General: Bowel sounds are normal. There is no distension.      Palpations: Abdomen is soft. There is no mass.      Tenderness: There is no abdominal tenderness. There is no guarding or rebound.   Musculoskeletal:         General: No tenderness. Normal range of motion.      Cervical back: Normal range of motion and neck supple.   Lymphadenopathy:      Cervical: No cervical adenopathy.   Skin:     General: Skin is warm and dry.   Neurological:      Mental Status: She is alert and oriented to person, place, and time.      Cranial Nerves: No cranial nerve deficit.   Psychiatric:         Behavior: Behavior normal.       Fluids  No intake or output data in the 24 hours ending 04/03/23 0812      Laboratory  Recent Labs     04/01/23  0028 04/02/23  0230 04/02/23  1352 04/02/23  1723 04/03/23  0135   WBC 7.1 5.2  --   --  4.4*   RBC 3.35* 2.49*  --   --  2.61*   HEMOGLOBIN 9.6* 7.2* 7.6* 8.0* 7.4*   HEMATOCRIT 30.9* 22.3* 24.2* 25.1* 23.7*   MCV 92.2 89.6  --   --  90.8   MCH 28.7 28.9  --   --  28.4   MCHC 31.1* 32.3*  --   --  31.2*   RDW 44.0 42.5  --   --  41.8   PLATELETCT 92* 72*  --   --  93*   MPV 12.1 12.5  --   --  12.4       Recent Labs     04/01/23  0028 04/02/23  0230 04/03/23  0135   SODIUM 138 138 136   POTASSIUM 4.4 4.4 4.4   CHLORIDE 107 106 105   CO2 16* 21 21   GLUCOSE 165* 118* 112*   BUN 22 32* 31*   CREATININE 1.51* 1.87* 1.74*   CALCIUM 9.1 8.7 8.8                     Imaging  US-EXTREMITY VENOUS LOWER BILAT   Final Result      DX-CHEST-PORTABLE (1 VIEW)   Final Result      1.  Decreased pulmonary opacities, probably improved pulmonary edema   2.  Persistently enlarged cardiac silhouette      DX-CHEST-PORTABLE (1 VIEW)   Final Result      No significant interval change.      DX-CHEST-LIMITED (1 VIEW)   Final Result         Airspace opacities throughout the right lung, similar to prior.      DX-CHEST-PORTABLE (1 VIEW)   Final Result      1.  Moderate cardiomegaly.   2.   Central perivascular and interstitial pulmonary edema asymmetrically greater on the right with patchy airspace opacities. Superimposed pneumonia is not excluded.      EC-ECHOCARDIOGRAM COMPLETE W/O CONT    (Results Pending)   CT-CHEST (THORAX) W/O    (Results Pending)          Assessment/Plan  * Pneumonia due to infectious organism- (present on admission)  Assessment & Plan  Patient presumed has pulmonary infection based on subjective chills, fever, dry cough and shortness of breath  The patient initially treated with empiric antibiotics, infectious disease consulting given the patient's immunocompromised status  COVID-19/influenza/RSV negative.  MRSA negative, additional respiratory viral panel negative  Obtain sputum culture, pending  The patient with positive BK virus titers, should typically not affect her lung function  CT positive for multifocal pneumonia    Anemia due to stage 3b chronic kidney disease (HCC)  Assessment & Plan  Significant drop since admission despite diuretics, recheck, rule out GI bleed  Hemoglobin stabilized, no definitive blood loss noted    Permanent atrial fibrillation (HCC)- (present on admission)  Assessment & Plan  Apparently recent development, has a monitor on currently, has been followed by cardiology  Ongoing anticoagulation  Beta-blockade for rate control      CKD (chronic kidney disease), stage III (HCC)  Assessment & Plan  CKD stage III of transplanted kidney  Avoid nephrotoxins  Nephrology consultation  Continue immunosuppression with caution  The patient apparently with BK virus infection/reactivation  Consideration for kidney biopsy and treatment  Nephrology managing conjunction with transplant team    Hypertensive urgency  Assessment & Plan  Continue home medications amlodipine, losartan  IV Lasix for volume reduction initially  Nitroglycerin drip initially  Monitor blood pressure, currently much improved  .    Kidney transplant recipient  Assessment & Plan  The patient  appears to be at baseline with her creatinine  Continue prednisone, tacrolimus, CellCept  Avoid nephrotoxins  Bicarb for acidosis    Acute respiratory failure with hypoxia due to volume overload, possible pneumonia (HCC)  Assessment & Plan  Patient with worsening oxygenation since admission, currently on a high flow nasal cannula oxygen, 35 L, 80%  Etiology includes pulmonary edema and community-acquired pneumonia.  Patient is on apixaban, therefore PE seems to be less likely.    Treatment for presumed possible heart failure, volume overload as well as pneumonia  Significantly hypertensive initially, adjusting medication regimen, volume control  Continue treatment for multifocal  pneumonia  Monitor input and output and daily weight.       Chest pain- (present on admission)  Assessment & Plan  Probably angina secondary to uncontrolled blood pressure.  Patient denies worsening of chest pain on deep inspiration or with cough.  EKG has not changed, troponin is in indeterminate range 27.  Patient is on anticoagulation with apixaban.  Continue current volume control, blood pressure control  Monitor on telemetry, repeat troponin  Obtain transthoracic echo on follow-up        Acquired hypothyroidism- (present on admission)  Assessment & Plan  Continue levothyroxine    Chronic heart failure with preserved ejection fraction (HCC)- (present on admission)  Assessment & Plan  History of ejection fraction 55%, follow-up on cardiac function by echocardiogram  Maintain euvolemia  Blood pressure control    Diabetes (HCC)  Assessment & Plan  Type 2 diabetes   Will place on insulin sliding scale.  Most recent A1c 6.0.       Plan  Follow-up H&H, stabilized, restart anticoagulation with caution  PPI for now, rule out GI bleed, follow occult blood  Holding diuresis currently, follow renal function  Nephrology consultation, renal transplant in 10 of/22 at [facility] [place]  Infectious disease consultation   Follow cultures closely, follow-up culture  from sputum collection  Ongoing rate control  Balance electrolytes closely  Continue immunosuppression with caution, reduce CellCept, follow-up cell counts daily  See orders  Medically complex high-risk patient        VTE prophylaxis: therapeutic anticoagulation with apixaban    I have performed a physical exam and reviewed and updated ROS and Plan today (4/3/2023). In review of yesterday's note (4/2/2023), there are no changes except as documented above.      Please note that this dictation was created using voice recognition software. I have made every reasonable attempt to correct obvious errors, but I expect that there are errors of grammar and possibly context that I did not discover before finalizing the note.

## 2023-04-03 NOTE — PROGRESS NOTES
Infectious Disease Progress Note    Author: Toby Can M.D. Date & Time of service: 4/3/2023  8:21 AM    Chief Complaint:  Follow-up for shortness of breath    Interval History:  4/3 Tmax 99, white count is 4.4 today, tolerating antimicrobials, creatinine slightly improved to 1.74, magnesium 1.6,     Labs Reviewed and Medications Reviewed.    Review of Systems:  Review of Systems   Respiratory:  Positive for cough and shortness of breath.      Hemodynamics:  No data recorded.  Monitored Temp: 37 °C (98.6 °F)  Pulse  Av.4  Min: 53  Max: 95   Blood Pressure : 127/49       Physical Exam:  Physical Exam  Constitutional:       General: She is not in acute distress.     Appearance: She is ill-appearing.   Eyes:      General:         Right eye: No discharge.         Left eye: No discharge.   Cardiovascular:      Rate and Rhythm: Normal rate.   Pulmonary:      Effort: Pulmonary effort is normal. No respiratory distress.      Breath sounds: No stridor.   Musculoskeletal:         General: No swelling or tenderness.   Skin:     Findings: No erythema or rash.   Neurological:      Mental Status: She is oriented to person, place, and time.   Psychiatric:         Mood and Affect: Mood normal.         Behavior: Behavior normal.       Meds:    Current Facility-Administered Medications:     mycophenolate    omeprazole    sodium bicarbonate    senna-docusate **AND** polyethylene glycol/lytes **AND** magnesium hydroxide **AND** bisacodyl    Respiratory Therapy Consult    acetaminophen    ondansetron    ondansetron    labetalol    cefepime    guaiFENesin dextromethorphan    amLODIPine    [Held by provider] apixaban    atorvastatin    losartan    predniSONE    tacrolimus    oxyCODONE immediate-release    HYDROmorphone    [Held by provider] furosemide    insulin regular **AND** POC blood glucose manual result **AND** NOTIFY MD and PharmD **AND** Administer 20 grams of glucose (approximately 8 ounces of fruit juice) every  15 minutes PRN FSBG less than 70 mg/dL **AND** dextrose bolus    tacrolimus    potassium chloride SA    metoprolol SR    Labs:  Recent Labs     04/01/23  0028 04/02/23  0230 04/02/23  1352 04/02/23  1723 04/03/23  0135   WBC 7.1 5.2  --   --  4.4*   RBC 3.35* 2.49*  --   --  2.61*   HEMOGLOBIN 9.6* 7.2* 7.6* 8.0* 7.4*   HEMATOCRIT 30.9* 22.3* 24.2* 25.1* 23.7*   MCV 92.2 89.6  --   --  90.8   MCH 28.7 28.9  --   --  28.4   RDW 44.0 42.5  --   --  41.8   PLATELETCT 92* 72*  --   --  93*   MPV 12.1 12.5  --   --  12.4   NEUTSPOLYS 83.60* 86.80*  --   --  86.80*   LYMPHOCYTES 8.30* 5.60*  --   --  5.00*   MONOCYTES 6.00 6.20  --   --  6.40   EOSINOPHILS 0.00 0.60  --   --  1.10   BASOPHILS 0.10 0.20  --   --  0.20     Recent Labs     04/01/23 0028 04/02/23  0230 04/03/23  0135   SODIUM 138 138 136   POTASSIUM 4.4 4.4 4.4   CHLORIDE 107 106 105   CO2 16* 21 21   GLUCOSE 165* 118* 112*   BUN 22 32* 31*     Recent Labs     04/01/23  0028 04/02/23  0230 04/03/23  0135   ALBUMIN 4.0 3.1* 3.3   TBILIRUBIN 0.9 0.9 0.9   ALKPHOSPHAT 75 55 61   TOTPROTEIN 6.6 5.7* 5.7*   ALTSGPT 21 20 19   ASTSGOT 19 18 16   CREATININE 1.51* 1.87* 1.74*       Imaging:  DX-CHEST-LIMITED (1 VIEW)    Result Date: 4/1/2023  3/31/2023 11:41 PM HISTORY/REASON FOR EXAM:  Shortness of Breath TECHNIQUE/EXAM DESCRIPTION AND NUMBER OF VIEWS: Single portable view of the chest. COMPARISON: 3/31/2023 FINDINGS: Airspace opacities throughout the right lung. No pleural effusion. No pneumothorax. Stable cardiopericardial silhouette.     Airspace opacities throughout the right lung, similar to prior.    DX-CHEST-PORTABLE (1 VIEW)    Result Date: 4/2/2023 4/2/2023 12:12 AM HISTORY/REASON FOR EXAM:  Shortness of Breath TECHNIQUE/EXAM DESCRIPTION AND NUMBER OF VIEWS: Single portable view of the chest. COMPARISON: 4/1/2023 FINDINGS: Electronic device again projects over the LEFT chest. HEART: Stable size. There is atherosclerotic calcification in the aortic arch.  LUNGS: There is mild peripheral interstitial prominence. There are perihilar opacities. There are bibasilar opacities. Lung volumes are low. PLEURA: No effusion or pneumothorax.     1.  Decreased pulmonary opacities, probably improved pulmonary edema 2.  Persistently enlarged cardiac silhouette    DX-CHEST-PORTABLE (1 VIEW)    Result Date: 4/1/2023 4/1/2023 8:34 AM HISTORY/REASON FOR EXAM:  Shortness of Breath. TECHNIQUE/EXAM DESCRIPTION AND NUMBER OF VIEWS: Single portable view of the chest. COMPARISON: One day prior FINDINGS: Cardiomediastinal silhouette is stable. Aortic calcified atherosclerotic plaque. Mild interstitial opacities could represent pulmonary edema and/or infection. There is similar consolidation in the right lung suspicious for pneumonia. No significant pleural effusion or pneumothorax. Surgical clips in the upper abdomen.     No significant interval change.    DX-CHEST-PORTABLE (1 VIEW)    Result Date: 3/31/2023  3/31/2023 5:25 PM HISTORY/REASON FOR EXAM:  possible sepsis. Atrial fibrillation. End-stage renal disease TECHNIQUE/EXAM DESCRIPTION AND NUMBER OF VIEWS: Single portable view of the chest. COMPARISON: 1 view chest 2/17/2023 FINDINGS: The soft tissues and bony structures are unremarkable. There is an electronic device projected over the aortic-pulmonic window. There are multiple clustered surgical clips in the left upper quadrant. There are surgical clips in the left arm presumably representing dialysis access vascular procedures. Moderate cardiomegaly. Moderate central perivascular and interstitial pulmonary edema with asymmetrically greater airspace opacity on the right. There is no effusion or pneumothorax. The right hemidiaphragm is elevated as seen on prior exam consistent with eventration.     1.  Moderate cardiomegaly. 2.  Central perivascular and interstitial pulmonary edema asymmetrically greater on the right with patchy airspace opacities. Superimposed pneumonia is not  excluded.    US-EXTREMITY VENOUS LOWER BILAT    Result Date: 2023   Vascular Laboratory  CONCLUSIONS  Bilateral lower extremity venous duplex imaging.  No deep venous thrombosis.  DIOR RAMIRES  Exam Date:     2023 07:30  Room #:     Inpatient  Priority:     Routine  Ht (in):             Wt (lb):  Ordering Physician:        NAYLA SPIVEY  Referring Physician:       ALLYSSA Avila  Sonographer:               Susy Brice RVT  Study Type:                Complete Bilateral  Technical Quality:         Adequate  Age:    72    Gender:     F  MRN:    [id number]  :    1950      BSA:  Indications:     Localized swelling, mass and lump, unspecified lower limb,                   Edema, unspecified  CPT Codes:       47241  ICD Codes:       R22.40  R60.9  History:         Bilateral lower extremity swelling/edema. No prior study.  Limitations:  PROCEDURES:  Bilateral lower extremity venous duplex imaging.  The following venous structures were evaluated: common femoral, deep  femoral, proximal great saphenous, femoral, popliteal, peroneal, and  posterior tibial veins.  Serial compression, color, and spectral Doppler flow evaluations were  performed.  FINDINGS:  Bilateral lower extremities.  All veins demonstrate complete color filling and compressibility with  normal venous flow dynamics including spontaneous flow and respiratory  phasicity.  No deep venous thrombosis.  Shailesh MCCRACKEN To  (Electronically Signed)  Final Date:      2023                   09:28      Micro:  Results       Procedure Component Value Units Date/Time    S. Aureus By PCR, Nasal Complete [[id number]] Collected: 23 1007    Order Status: Completed Specimen: Nasal from Respiratory Updated: 23 1429     Staph aureus by PCR Negative     MRSA by PCR Negative    Narrative:      Collected By: [id number] CANTU SHAILESH  UTILIZATION ALERT: Has Flu/RSV/COVID PCR testing been  performed,  resulted reviewed, and consulted with Infectious  Diseases or PICU Provider Teams?->Yes  Have you been in close contact with a person who is suspected  or known to be positive for COVID-19 within the last 30 days  (e.g. last seen that person < 30 days ago)->No    Respiratory Panel By PCR [[id number]] Collected: 04/02/23 1007    Order Status: Completed Specimen: Respirate from Nasopharyngeal Updated: 04/02/23 1131     Adenovirus, PCR Not Detected     SARS-CoV-2 (COVID-19) RNA by BRIGIDA NotDetected     Comment: [facility] providers: PLEASE REFER TO DE-ESCALATION AND RETESTING PROTOCOL  on [email or web address]    **The [employer] Respiratory Panel 2.1 (RP2.1) RT-PCR test is FDA authorized.          Coronavirus 229E, PCR Not Detected     Coronavirus HKU1, PCR Not Detected     Coronavirus NL63, PCR Not Detected     Coronavirus OC43, PCR Not Detected     Human Metapneumovirus, PCR Not Detected     Rhino/Enterovirus, PCR Not Detected     Influenza A, PCR Not Detected     Influenza B, PCR Not Detected     Parainfluenza 1, PCR Not Detected     Parainfluenza 2, PCR Not Detected     Parainfluenza 3, PCR Not Detected     Parainfluenza 4, PCR Not Detected     RSV (Respiratory Syncytial Virus), PCR Not Detected     Bordetella parapertussis (BU6156), PCR Not Detected     Bordetella pertussis (ptxP), PCR Not Detected     Mycoplasma pneumoniae, PCR Not Detected     Chlamydia pneumoniae, PCR Not Detected    Narrative:      Collected By: [id number] CANTU SHAILESH  UTILIZATION ALERT: Has Flu/RSV/COVID PCR testing been  performed, resulted reviewed, and consulted with Infectious  Diseases or PICU Provider Teams?->Yes  Have you been in close contact with a person who is suspected  or known to be positive for COVID-19 within the last 30 days  (e.g. last seen that person < 30 days ago)->No    Urine Culture (New) [[id number]] Collected: 04/01/23 1616    Order Status: Sent Specimen: Urine Updated: 04/02/23 1117    Narrative:      Indication for culture:->Evaluation  for sepsis without a  clear source of infection    CULTURE RESPIRATORY W/ GRM STN [[id number]]     Order Status: Completed Specimen: Respirate from Sputum     GRAM STAIN [[id number]] Collected: 04/01/23 1647    Order Status: Completed Specimen: Respirate Updated: 04/01/23 1859     Significant Indicator .     Source RESP     Site SPUTUM     Gram Stain Result Many epithelial cells.  Moderate WBCs.  Moderate mixed bacteria, no predominant organism seen.  Sputum Gram stain quality score is <1, probable  oropharyngeal contamination. Culture not performed.  Recollect if clinically indicated.      Narrative:      CALL  [facility]  [facility] tel. [phone],  CALLED  [facility] tel. [phone] 04/01/2023, 18:57, Nurse called no  asqyys-pmljugrhcah-yatzp change  Preferably before first antibiotic dose - add Gram stain if  indicated  Preferably before first antibiotic dose - add Gram stain if    URINALYSIS [[id number]]  (Abnormal) Collected: 04/01/23 1616    Order Status: Completed Specimen: Urine, Han Cath Updated: 04/01/23 1755     Color Yellow     Character Clear     Specific Gravity 1.011     Ph 7.5     Glucose Negative mg/dL      Ketones Negative mg/dL      Protein Negative mg/dL      Bilirubin Negative     Urobilinogen, Urine 0.2     Nitrite Negative     Leukocyte Esterase Trace     Occult Blood Small     Micro Urine Req Microscopic    Narrative:      Collected By: [id number] WICHO MCCRACKEN    Culture Respiratory W/ GRM STN [[id number]] Collected: 04/01/23 1647    Order Status: Canceled Specimen: Sputum     Blood Culture [[id number]] Collected: 03/31/23 1707    Order Status: Completed Specimen: Blood from Peripheral Updated: 04/01/23 0745     Significant Indicator NEG     Source BLD     Site PERIPHERAL     Culture Result No Growth  Note: Blood cultures are incubated for 5 days and  are monitored continuously.Positive blood cultures  are called to the RN and reported as soon as  they are identified.      Narrative:      Per Hospital Policy:  "Only change Specimen Src: to \"Line\" if  specified by physician order.  Right Hand    Blood Culture [[id number]] Collected: 23 1707    Order Status: Completed Specimen: Blood from Peripheral Updated: 23 0745     Significant Indicator NEG     Source BLD     Site PERIPHERAL     Culture Result No Growth  Note: Blood cultures are incubated for 5 days and  are monitored continuously.Positive blood cultures  are called to the RN and reported as soon as  they are identified.      Narrative:      Per Hospital Policy: Only change Specimen Src: to \"Line\" if  specified by physician order.  Right AC    Respiratory Panel By PCR [[id number]]     Order Status: Canceled Specimen: Respirate from Nasopharyngeal     COV-2, FLU A/B, AND RSV BY PCR (2-4 HOURS [employer]): Collect NP swab in VTM [[id number]] Collected: 23    Order Status: Completed Specimen: Respirate from Nasopharyngeal Updated: 23     Influenza virus A RNA Negative     Influenza virus B, PCR Negative     RSV, PCR Negative     SARS-CoV-2 by PCR NotDetected     Comment: [facility] providers: PLEASE REFER TO DE-ESCALATION AND RETESTING PROTOCOL  on [email or web address]    **The [employer] Gene[employer]pert Xpress SARS-CoV-2 RT-PCR Test has been made  available for use under the Emergency Use Authorization (EUA) only.          SARS-CoV-2 Source NP Swab    Urinalysis [[id number]]     Order Status: Sent Specimen: Urine     BLOOD CULTURE [[id number]] Collected: 23 0000    Order Status: Canceled Specimen: Other from Peripheral     BLOOD CULTURE [[id number]] Collected: 23 0000    Order Status: Canceled Specimen: Other from Peripheral             Assessment:  Tere Gallegos is a 72 y.o. female patient with history of  donor renal transplant in 2022 currently on tacrolimus, CellCept, prednisone 5 mg daily, CAD status post PCI, paroxysmal A-fib, chronic anticoagulation with Eliquis, hypertension, hypothyroidism, HFpEF, type 2 diabetes, " hypertension, presented with approximately 1 day of acute onset of shortness of breath at rest with a pleuritic chest pain found to be febrile and hypoxemic, chest x-ray concerning for pneumonia versus fluid overload.  Procalcitonin is negative -good negative predictive value especially given patient has CKD.     Regarding her renal transplant, patient is on tacrolimus, CellCept, prednisone 5 mg daily.  Recent positive BK virus viremia and then urine 3/3/2023, being monitored by [facility].  Recent CMV quant PCR from blood negative.  Repeat chest x-ray from yesterday with similar possibly improved bilateral opacities.     Pertinent Diagnoses:  Acute hypoxemic respiratory failure, currently requiring high flow nasal cannula oxygen  Community-acquired pneumonia  Status post renal transplant  CKD stage III  Immunosuppressed status  BK viremia  CAD  Type 2 diabetes    Plan:  -Okay to continue renally dosed IV cefepime for now.  CT chest is pending  -MRSA nares swab negative, RVP negative.  Urine Strep and Legionella antigens pending  -BK viremia noted, being managed by transplant clinic in [facility].  Last decreased immunosuppression dose of CellCept, also due to leukopenia at that time. Monitor renal function, nephrology is on board.  Check renal function daily given fluctuating renal function, will need to adjust dose of cefepime and monitor for neurotoxicity    Discussed with internal medicine, Dr. Robles

## 2023-04-03 NOTE — CARE PLAN
Problem: Hyperinflation  Goal: Prevent or improve atelectasis  Description: Target End Date:  3 to 4 days    1. Instruct incentive spirometry usage  2.  Perform hyperinflation therapy as indicated  Outcome: Progressing    Respiratory Update    Treatment modality: PEP    IS 1400  Frequency: BID    Pt tolerating current treatments well with no adverse reactions.

## 2023-04-03 NOTE — CARE PLAN
The patient is Watcher - Medium risk of patient condition declining or worsening    Shift Goals  Clinical Goals: wean o2, VSS  Patient Goals: sleep, rest  Family Goals: megan    Progress made toward(s) clinical / shift goals:    Problem: Care Map:  Optimal Outcome for the Pneumonia Patient  Goal: Collection and monitoring of appropriate tests and labs  Outcome: Progressing     Problem: Risk for Aspiration  Goal: Patient's risk for aspiration will be absent or decrease  Outcome: Progressing     Problem: Self Care  Goal: Patient will have the ability to perform ADLs independently or with assistance (bathe, groom, dress, toilet and feed)  Outcome: Progressing     Problem: Pain - Standard  Goal: Alleviation of pain or a reduction in pain to the patient’s comfort goal  Outcome: Progressing     Problem: Fall Risk  Goal: Patient will remain free from falls  Outcome: Progressing       Patient is not progressing towards the following goals:

## 2023-04-04 LAB
ALBUMIN SERPL BCP-MCNC: 3.5 G/DL (ref 3.2–4.9)
ALBUMIN/GLOB SERPL: 1.4 G/DL
ALP SERPL-CCNC: 68 U/L (ref 30–99)
ALT SERPL-CCNC: 20 U/L (ref 2–50)
ANION GAP SERPL CALC-SCNC: 9 MMOL/L (ref 7–16)
AST SERPL-CCNC: 18 U/L (ref 12–45)
BACTERIA UR CULT: NORMAL
BILIRUB SERPL-MCNC: 0.8 MG/DL (ref 0.1–1.5)
BUN SERPL-MCNC: 27 MG/DL (ref 8–22)
CALCIUM ALBUM COR SERPL-MCNC: 9.4 MG/DL (ref 8.5–10.5)
CALCIUM SERPL-MCNC: 9 MG/DL (ref 8.5–10.5)
CHLORIDE SERPL-SCNC: 107 MMOL/L (ref 96–112)
CMV IGG SERPL IA-ACNC: 2.4 U/ML
CMV IGM SERPL IA-ACNC: 10 AU/ML
CO2 SERPL-SCNC: 20 MMOL/L (ref 20–33)
CREAT SERPL-MCNC: 1.34 MG/DL (ref 0.5–1.4)
ERYTHROCYTE [DISTWIDTH] IN BLOOD BY AUTOMATED COUNT: 42.5 FL (ref 35.9–50)
GFR SERPLBLD CREATININE-BSD FMLA CKD-EPI: 42 ML/MIN/1.73 M 2
GLOBULIN SER CALC-MCNC: 2.5 G/DL (ref 1.9–3.5)
GLUCOSE BLD STRIP.AUTO-MCNC: 108 MG/DL (ref 65–99)
GLUCOSE BLD STRIP.AUTO-MCNC: 190 MG/DL (ref 65–99)
GLUCOSE BLD STRIP.AUTO-MCNC: 191 MG/DL (ref 65–99)
GLUCOSE BLD STRIP.AUTO-MCNC: 204 MG/DL (ref 65–99)
GLUCOSE SERPL-MCNC: 124 MG/DL (ref 65–99)
HAPTOGLOB SERPL-MCNC: 229 MG/DL (ref 30–200)
HCT VFR BLD AUTO: 25.7 % (ref 37–47)
HEMOCCULT SP1 STL QL: POSITIVE
HEMOCCULT SP2 STL QL: NEGATIVE
HGB BLD-MCNC: 7.9 G/DL (ref 12–16)
L PNEUMO AG UR QL IA: NEGATIVE
LV EJECT FRACT  99904: 60
LV EJECT FRACT MOD 2C 99903: 69.81
LV EJECT FRACT MOD 4C 99902: 70.09
MAGNESIUM SERPL-MCNC: 1.5 MG/DL (ref 1.5–2.5)
MCH RBC QN AUTO: 28.4 PG (ref 27–33)
MCHC RBC AUTO-ENTMCNC: 30.7 G/DL (ref 33.6–35)
MCV RBC AUTO: 92.4 FL (ref 81.4–97.8)
PHOSPHATE SERPL-MCNC: 1.8 MG/DL (ref 2.5–4.5)
PLATELET # BLD AUTO: 99 K/UL (ref 164–446)
PMV BLD AUTO: 12.4 FL (ref 9–12.9)
POTASSIUM SERPL-SCNC: 4.8 MMOL/L (ref 3.6–5.5)
PROT SERPL-MCNC: 6 G/DL (ref 6–8.2)
RBC # BLD AUTO: 2.78 M/UL (ref 4.2–5.4)
S PNEUM AG UR QL: NEGATIVE
SIGNIFICANT IND 70042: NORMAL
SITE SITE: NORMAL
SODIUM SERPL-SCNC: 136 MMOL/L (ref 135–145)
SOURCE SOURCE: NORMAL
TACROLIMUS BLD-MCNC: <2 NG/ML
WBC # BLD AUTO: 4.5 K/UL (ref 4.8–10.8)

## 2023-04-04 PROCEDURE — 700102 HCHG RX REV CODE 250 W/ 637 OVERRIDE(OP): Performed by: HOSPITALIST

## 2023-04-04 PROCEDURE — 85027 COMPLETE CBC AUTOMATED: CPT

## 2023-04-04 PROCEDURE — A9270 NON-COVERED ITEM OR SERVICE: HCPCS | Performed by: INTERNAL MEDICINE

## 2023-04-04 PROCEDURE — A9270 NON-COVERED ITEM OR SERVICE: HCPCS | Performed by: HOSPITALIST

## 2023-04-04 PROCEDURE — 93306 TTE W/DOPPLER COMPLETE: CPT | Mod: 26 | Performed by: INTERNAL MEDICINE

## 2023-04-04 PROCEDURE — 700111 HCHG RX REV CODE 636 W/ 250 OVERRIDE (IP): Performed by: INTERNAL MEDICINE

## 2023-04-04 PROCEDURE — 84100 ASSAY OF PHOSPHORUS: CPT

## 2023-04-04 PROCEDURE — 82962 GLUCOSE BLOOD TEST: CPT | Mod: 91

## 2023-04-04 PROCEDURE — 700111 HCHG RX REV CODE 636 W/ 250 OVERRIDE (IP): Performed by: HOSPITALIST

## 2023-04-04 PROCEDURE — 700102 HCHG RX REV CODE 250 W/ 637 OVERRIDE(OP): Performed by: INTERNAL MEDICINE

## 2023-04-04 PROCEDURE — 770000 HCHG ROOM/CARE - INTERMEDIATE ICU *

## 2023-04-04 PROCEDURE — 94669 MECHANICAL CHEST WALL OSCILL: CPT

## 2023-04-04 PROCEDURE — 700101 HCHG RX REV CODE 250: Performed by: HOSPITALIST

## 2023-04-04 PROCEDURE — 700105 HCHG RX REV CODE 258: Performed by: HOSPITALIST

## 2023-04-04 PROCEDURE — 80053 COMPREHEN METABOLIC PANEL: CPT

## 2023-04-04 PROCEDURE — 99233 SBSQ HOSP IP/OBS HIGH 50: CPT | Performed by: INTERNAL MEDICINE

## 2023-04-04 PROCEDURE — 94760 N-INVAS EAR/PLS OXIMETRY 1: CPT

## 2023-04-04 PROCEDURE — 99232 SBSQ HOSP IP/OBS MODERATE 35: CPT | Performed by: INTERNAL MEDICINE

## 2023-04-04 PROCEDURE — 83735 ASSAY OF MAGNESIUM: CPT

## 2023-04-04 PROCEDURE — 99233 SBSQ HOSP IP/OBS HIGH 50: CPT | Performed by: HOSPITALIST

## 2023-04-04 RX ORDER — GAUZE BANDAGE 2" X 2"
100 BANDAGE TOPICAL DAILY
Status: DISCONTINUED | OUTPATIENT
Start: 2023-04-04 | End: 2023-04-08 | Stop reason: HOSPADM

## 2023-04-04 RX ORDER — CHOLECALCIFEROL (VITAMIN D3) 125 MCG
5 CAPSULE ORAL NIGHTLY
Status: DISCONTINUED | OUTPATIENT
Start: 2023-04-04 | End: 2023-04-08 | Stop reason: HOSPADM

## 2023-04-04 RX ORDER — MAGNESIUM SULFATE HEPTAHYDRATE 40 MG/ML
4 INJECTION, SOLUTION INTRAVENOUS ONCE
Status: COMPLETED | OUTPATIENT
Start: 2023-04-04 | End: 2023-04-04

## 2023-04-04 RX ORDER — MAGNESIUM SULFATE HEPTAHYDRATE 40 MG/ML
2 INJECTION, SOLUTION INTRAVENOUS ONCE
Status: DISCONTINUED | OUTPATIENT
Start: 2023-04-04 | End: 2023-04-04

## 2023-04-04 RX ORDER — MINOXIDIL 2.5 MG/1
2.5 TABLET ORAL
Status: DISCONTINUED | OUTPATIENT
Start: 2023-04-04 | End: 2023-04-08 | Stop reason: HOSPADM

## 2023-04-04 RX ADMIN — SODIUM PHOSPHATE, MONOBASIC, MONOHYDRATE AND SODIUM PHOSPHATE, DIBASIC, ANHYDROUS 30 MMOL: 276; 142 INJECTION, SOLUTION INTRAVENOUS at 15:27

## 2023-04-04 RX ADMIN — Medication 5 MG: at 20:33

## 2023-04-04 RX ADMIN — INSULIN HUMAN 2 UNITS: 100 INJECTION, SOLUTION PARENTERAL at 20:32

## 2023-04-04 RX ADMIN — TACROLIMUS 1 MG: 1 CAPSULE ORAL at 05:36

## 2023-04-04 RX ADMIN — APIXABAN 5 MG: 5 TABLET, FILM COATED ORAL at 05:36

## 2023-04-04 RX ADMIN — OMEPRAZOLE 20 MG: 20 CAPSULE, DELAYED RELEASE ORAL at 05:34

## 2023-04-04 RX ADMIN — SODIUM BICARBONATE 1300 MG: 650 TABLET ORAL at 07:46

## 2023-04-04 RX ADMIN — AMLODIPINE BESYLATE 10 MG: 10 TABLET ORAL at 05:34

## 2023-04-04 RX ADMIN — TACROLIMUS 2 MG: 1 CAPSULE ORAL at 17:09

## 2023-04-04 RX ADMIN — PREDNISONE 5 MG: 10 TABLET ORAL at 05:34

## 2023-04-04 RX ADMIN — Medication 5 MG: at 01:02

## 2023-04-04 RX ADMIN — MYCOPHENOLATE MOFETIL 500 MG: 250 CAPSULE ORAL at 05:37

## 2023-04-04 RX ADMIN — SODIUM BICARBONATE 1300 MG: 650 TABLET ORAL at 20:30

## 2023-04-04 RX ADMIN — SODIUM BICARBONATE 1300 MG: 650 TABLET ORAL at 14:24

## 2023-04-04 RX ADMIN — MAGNESIUM SULFATE HEPTAHYDRATE 4 G: 40 INJECTION, SOLUTION INTRAVENOUS at 10:56

## 2023-04-04 RX ADMIN — INSULIN HUMAN 1 UNITS: 100 INJECTION, SOLUTION PARENTERAL at 12:19

## 2023-04-04 RX ADMIN — DOCUSATE SODIUM 50 MG AND SENNOSIDES 8.6 MG 2 TABLET: 8.6; 5 TABLET, FILM COATED ORAL at 05:37

## 2023-04-04 RX ADMIN — ATORVASTATIN CALCIUM 20 MG: 20 TABLET, FILM COATED ORAL at 20:30

## 2023-04-04 RX ADMIN — LOSARTAN POTASSIUM 100 MG: 50 TABLET, FILM COATED ORAL at 17:09

## 2023-04-04 RX ADMIN — MINOXIDIL 2.5 MG: 2.5 TABLET ORAL at 14:23

## 2023-04-04 RX ADMIN — METOPROLOL SUCCINATE 25 MG: 25 TABLET, EXTENDED RELEASE ORAL at 05:37

## 2023-04-04 RX ADMIN — INSULIN HUMAN 1 UNITS: 100 INJECTION, SOLUTION PARENTERAL at 17:11

## 2023-04-04 RX ADMIN — APIXABAN 5 MG: 5 TABLET, FILM COATED ORAL at 17:09

## 2023-04-04 RX ADMIN — Medication 100 MG: at 10:56

## 2023-04-04 ASSESSMENT — ENCOUNTER SYMPTOMS
SHORTNESS OF BREATH: 1
BLURRED VISION: 0
DOUBLE VISION: 0
SPUTUM PRODUCTION: 1
CHILLS: 0
SORE THROAT: 0
COUGH: 0
ABDOMINAL PAIN: 0
HEADACHES: 0
DIZZINESS: 0
LOSS OF CONSCIOUSNESS: 0
FEVER: 0
WEAKNESS: 1
BACK PAIN: 0
PALPITATIONS: 0
DIARRHEA: 0
SHORTNESS OF BREATH: 0
VOMITING: 0
NAUSEA: 0
COUGH: 1

## 2023-04-04 ASSESSMENT — PAIN DESCRIPTION - PAIN TYPE
TYPE: ACUTE PAIN

## 2023-04-04 ASSESSMENT — FIBROSIS 4 INDEX: FIB4 SCORE: 2.97

## 2023-04-04 NOTE — CARE PLAN
The patient is Stable - Low risk of patient condition declining or worsening    Shift Goals  Clinical Goals: wean O2  Patient Goals: sleep, rest  Family Goals: megan    Progress made toward(s) clinical / shift goals:  Pt weaned from HHF to oxy mask      Problem: Care Map:  Optimal Outcome for the Pneumonia Patient  Goal: Collection and monitoring of appropriate tests and labs  Outcome: Progressing     Problem: Respiratory  Goal: Patient will achieve/maintain optimum respiratory ventilation and gas exchange  Outcome: Progressing     Problem: Risk for Aspiration  Goal: Patient's risk for aspiration will be absent or decrease  Outcome: Progressing     Problem: Hemodynamics - Pneumonia  Goal: Patient's hemodynamics, fluid balance and neurologic status will be stable or improve  Outcome: Progressing     Problem: Self Care  Goal: Patient will have the ability to perform ADLs independently or with assistance (bathe, groom, dress, toilet and feed)  Outcome: Progressing     Problem: Pain - Standard  Goal: Alleviation of pain or a reduction in pain to the patient’s comfort goal  Outcome: Progressing     Problem: Skin Integrity  Goal: Skin integrity is maintained or improved  Outcome: Progressing

## 2023-04-04 NOTE — PROGRESS NOTES
Infectious Disease Progress Note    Author: Toby Can M.D. Date & Time of service: 2023  8:41 AM    Chief Complaint:  Follow-up for shortness of breath    Interval History:  4/3 Tmax 99, white count is 4.4 today, tolerating antimicrobials, creatinine slightly improved to 1.74, magnesium 1.6,    patient remains afebrile, white count is 4.5, tolerating cefepime.  Creatinine is down to 1.34 today, AST and ALT within normal limits, magnesium 1.5    Labs Reviewed and Medications Reviewed.    Review of Systems:  Review of Systems   Respiratory:  Positive for cough and shortness of breath.      Hemodynamics:  Temp (24hrs), Av.3 °C (97.4 °F), Min:36.3 °C (97.4 °F), Max:36.3 °C (97.4 °F)  Temperature: 36.3 °C (97.4 °F), Monitored Temp: 37 °C (98.6 °F)  Pulse  Av.6  Min: 53  Max: 95   Blood Pressure : (!) 148/65       Physical Exam:  Physical Exam  Constitutional:       General: She is not in acute distress.     Appearance: She is ill-appearing.   Eyes:      General:         Right eye: No discharge.         Left eye: No discharge.   Cardiovascular:      Rate and Rhythm: Normal rate.   Pulmonary:      Effort: Pulmonary effort is normal. No respiratory distress.      Breath sounds: No stridor.   Musculoskeletal:         General: No swelling or tenderness.   Skin:     Findings: No erythema or rash.   Neurological:      Mental Status: She is oriented to person, place, and time.   Psychiatric:         Mood and Affect: Mood normal.         Behavior: Behavior normal.       Meds:    Current Facility-Administered Medications:     melatonin    mycophenolate    omeprazole    sodium bicarbonate    senna-docusate **AND** polyethylene glycol/lytes **AND** magnesium hydroxide **AND** bisacodyl    Respiratory Therapy Consult    acetaminophen    ondansetron    ondansetron    labetalol    cefepime    guaiFENesin dextromethorphan    amLODIPine    apixaban    atorvastatin    losartan    predniSONE    tacrolimus     oxyCODONE immediate-release    HYDROmorphone    insulin regular **AND** POC blood glucose manual result **AND** NOTIFY MD and PharmD **AND** Administer 20 grams of glucose (approximately 8 ounces of fruit juice) every 15 minutes PRN FSBG less than 70 mg/dL **AND** dextrose bolus    tacrolimus    metoprolol SR    Labs:  Recent Labs     04/02/23  0230 04/02/23  1352 04/03/23  0135 04/03/23  0910 04/04/23  0107   WBC 5.2  --  4.4*  --  4.5*   RBC 2.49*  --  2.61*  --  2.78*   HEMOGLOBIN 7.2*   < > 7.4* 8.0* 7.9*   HEMATOCRIT 22.3*   < > 23.7* 26.3* 25.7*   MCV 89.6  --  90.8  --  92.4   MCH 28.9  --  28.4  --  28.4   RDW 42.5  --  41.8  --  42.5   PLATELETCT 72*  --  93*  --  99*   MPV 12.5  --  12.4  --  12.4   NEUTSPOLYS 86.80*  --  86.80*  --   --    LYMPHOCYTES 5.60*  --  5.00*  --   --    MONOCYTES 6.20  --  6.40  --   --    EOSINOPHILS 0.60  --  1.10  --   --    BASOPHILS 0.20  --  0.20  --   --     < > = values in this interval not displayed.       Recent Labs     04/02/23 0230 04/03/23 0135 04/04/23 0107   SODIUM 138 136 136   POTASSIUM 4.4 4.4 4.8   CHLORIDE 106 105 107   CO2 21 21 20   GLUCOSE 118* 112* 124*   BUN 32* 31* 27*       Recent Labs     04/02/23 0230 04/03/23 0135 04/04/23 0107   ALBUMIN 3.1* 3.3 3.5   TBILIRUBIN 0.9 0.9 0.8   ALKPHOSPHAT 55 61 68   TOTPROTEIN 5.7* 5.7* 6.0   ALTSGPT 20 19 20   ASTSGOT 18 16 18   CREATININE 1.87* 1.74* 1.34         Imaging:  DX-CHEST-LIMITED (1 VIEW)    Result Date: 4/1/2023  3/31/2023 11:41 PM HISTORY/REASON FOR EXAM:  Shortness of Breath TECHNIQUE/EXAM DESCRIPTION AND NUMBER OF VIEWS: Single portable view of the chest. COMPARISON: 3/31/2023 FINDINGS: Airspace opacities throughout the right lung. No pleural effusion. No pneumothorax. Stable cardiopericardial silhouette.     Airspace opacities throughout the right lung, similar to prior.    DX-CHEST-PORTABLE (1 VIEW)    Result Date: 4/2/2023 4/2/2023 12:12 AM HISTORY/REASON FOR EXAM:  Shortness of Breath  TECHNIQUE/EXAM DESCRIPTION AND NUMBER OF VIEWS: Single portable view of the chest. COMPARISON: 4/1/2023 FINDINGS: Electronic device again projects over the LEFT chest. HEART: Stable size. There is atherosclerotic calcification in the aortic arch. LUNGS: There is mild peripheral interstitial prominence. There are perihilar opacities. There are bibasilar opacities. Lung volumes are low. PLEURA: No effusion or pneumothorax.     1.  Decreased pulmonary opacities, probably improved pulmonary edema 2.  Persistently enlarged cardiac silhouette    DX-CHEST-PORTABLE (1 VIEW)    Result Date: 4/1/2023 4/1/2023 8:34 AM HISTORY/REASON FOR EXAM:  Shortness of Breath. TECHNIQUE/EXAM DESCRIPTION AND NUMBER OF VIEWS: Single portable view of the chest. COMPARISON: One day prior FINDINGS: Cardiomediastinal silhouette is stable. Aortic calcified atherosclerotic plaque. Mild interstitial opacities could represent pulmonary edema and/or infection. There is similar consolidation in the right lung suspicious for pneumonia. No significant pleural effusion or pneumothorax. Surgical clips in the upper abdomen.     No significant interval change.    DX-CHEST-PORTABLE (1 VIEW)    Result Date: 3/31/2023  3/31/2023 5:25 PM HISTORY/REASON FOR EXAM:  possible sepsis. Atrial fibrillation. End-stage renal disease TECHNIQUE/EXAM DESCRIPTION AND NUMBER OF VIEWS: Single portable view of the chest. COMPARISON: 1 view chest 2/17/2023 FINDINGS: The soft tissues and bony structures are unremarkable. There is an electronic device projected over the aortic-pulmonic window. There are multiple clustered surgical clips in the left upper quadrant. There are surgical clips in the left arm presumably representing dialysis access vascular procedures. Moderate cardiomegaly. Moderate central perivascular and interstitial pulmonary edema with asymmetrically greater airspace opacity on the right. There is no effusion or pneumothorax. The right hemidiaphragm is elevated  as seen on prior exam consistent with eventration.     1.  Moderate cardiomegaly. 2.  Central perivascular and interstitial pulmonary edema asymmetrically greater on the right with patchy airspace opacities. Superimposed pneumonia is not excluded.    US-EXTREMITY VENOUS LOWER BILAT    Result Date: 2023   Vascular Laboratory  CONCLUSIONS  Bilateral lower extremity venous duplex imaging.  No deep venous thrombosis.  DIOR RAMIRES  Exam Date:     2023 07:30  Room #:     Inpatient  Priority:     Routine  Ht (in):             Wt (lb):  Ordering Physician:        NAYLA SPIVEY  Referring Physician:       [id number] Polk  Sonographer:               Susy Brice RVT  Study Type:                Complete Bilateral  Technical Quality:         Adequate  Age:    72    Gender:     F  MRN:    [id number]  :    1950      BSA:  Indications:     Localized swelling, mass and lump, unspecified lower limb,                   Edema, unspecified  CPT Codes:       42111  ICD Codes:       R22.40  R60.9  History:         Bilateral lower extremity swelling/edema. No prior study.  Limitations:  PROCEDURES:  Bilateral lower extremity venous duplex imaging.  The following venous structures were evaluated: common femoral, deep  femoral, proximal great saphenous, femoral, popliteal, peroneal, and  posterior tibial veins.  Serial compression, color, and spectral Doppler flow evaluations were  performed.  FINDINGS:  Bilateral lower extremities.  All veins demonstrate complete color filling and compressibility with  normal venous flow dynamics including spontaneous flow and respiratory  phasicity.  No deep venous thrombosis.  Shailesh MCCRACKEN To  (Electronically Signed)  Final Date:      2023                   09:28      Micro:  Results       Procedure Component Value Units Date/Time    Urine Culture (New) [[id number]] Collected: 23 1616    Order Status: Completed  Specimen: Urine Updated: 04/04/23 0614     Significant Indicator NEG     Source UR     Site -     Culture Result No growth at 48 hours.    Narrative:      Indication for culture:->Evaluation for sepsis without a  clear source of infection  Indication for culture:->Evaluation for sepsis without a    S. Aureus By PCR, Nasal Complete [[id number]] Collected: 04/02/23 1007    Order Status: Completed Specimen: Nasal from Respiratory Updated: 04/02/23 1429     Staph aureus by PCR Negative     MRSA by PCR Negative    Narrative:      Collected By: [id number] CANTU SHAILESH  UTILIZATION ALERT: Has Flu/RSV/COVID PCR testing been  performed, resulted reviewed, and consulted with Infectious  Diseases or PICU Provider Teams?->Yes  Have you been in close contact with a person who is suspected  or known to be positive for COVID-19 within the last 30 days  (e.g. last seen that person < 30 days ago)->No    Respiratory Panel By PCR [[id number]] Collected: 04/02/23 1007    Order Status: Completed Specimen: Respirate from Nasopharyngeal Updated: 04/02/23 1131     Adenovirus, PCR Not Detected     SARS-CoV-2 (COVID-19) RNA by BRIGIDA NotDetected     Comment: [facility] providers: PLEASE REFER TO DE-ESCALATION AND RETESTING PROTOCOL  on [email or web address]    **The [employer] Respiratory Panel 2.1 (RP2.1) RT-PCR test is FDA authorized.          Coronavirus 229E, PCR Not Detected     Coronavirus HKU1, PCR Not Detected     Coronavirus NL63, PCR Not Detected     Coronavirus OC43, PCR Not Detected     Human Metapneumovirus, PCR Not Detected     Rhino/Enterovirus, PCR Not Detected     Influenza A, PCR Not Detected     Influenza B, PCR Not Detected     Parainfluenza 1, PCR Not Detected     Parainfluenza 2, PCR Not Detected     Parainfluenza 3, PCR Not Detected     Parainfluenza 4, PCR Not Detected     RSV (Respiratory Syncytial Virus), PCR Not Detected     Bordetella parapertussis ([id number]), PCR Not Detected     Bordetella pertussis (ptxP), PCR Not Detected      Mycoplasma pneumoniae, PCR Not Detected     Chlamydia pneumoniae, PCR Not Detected    Narrative:      Collected By: [id number] CANTU SHAILESH  UTILIZATION ALERT: Has Flu/RSV/COVID PCR testing been  performed, resulted reviewed, and consulted with Infectious  Diseases or PICU Provider Teams?->Yes  Have you been in close contact with a person who is suspected  or known to be positive for COVID-19 within the last 30 days  (e.g. last seen that person < 30 days ago)->No    CULTURE RESPIRATORY W/ GRM STN [[id number]]     Order Status: Completed Specimen: Respirate from Sputum     GRAM STAIN [[id number]] Collected: 04/01/23 1647    Order Status: Completed Specimen: Respirate Updated: 04/01/23 1859     Significant Indicator .     Source RESP     Site SPUTUM     Gram Stain Result Many epithelial cells.  Moderate WBCs.  Moderate mixed bacteria, no predominant organism seen.  Sputum Gram stain quality score is <1, probable  oropharyngeal contamination. Culture not performed.  Recollect if clinically indicated.      Narrative:      CALL  [facility]  [facility] tel. [phone],  CALLED  [facility] tel. [phone] 04/01/2023, 18:57, Nurse called no  [id number] change  Preferably before first antibiotic dose - add Gram stain if  indicated  Preferably before first antibiotic dose - add Gram stain if    URINALYSIS [[id number]]  (Abnormal) Collected: 04/01/23 1616    Order Status: Completed Specimen: Urine, Han Cath Updated: 04/01/23 1755     Color Yellow     Character Clear     Specific Gravity 1.011     Ph 7.5     Glucose Negative mg/dL      Ketones Negative mg/dL      Protein Negative mg/dL      Bilirubin Negative     Urobilinogen, Urine 0.2     Nitrite Negative     Leukocyte Esterase Trace     Occult Blood Small     Micro Urine Req Microscopic    Narrative:      Collected By: [id number] WICHO MCCRACKEN    Culture Respiratory W/ GRM STN [[id number]] Collected: 04/01/23 1647    Order Status: Canceled Specimen: Sputum     Blood Culture [[id number]]  "Collected: 03/31/23 1707    Order Status: Completed Specimen: Blood from Peripheral Updated: 04/01/23 0745     Significant Indicator NEG     Source BLD     Site PERIPHERAL     Culture Result No Growth  Note: Blood cultures are incubated for 5 days and  are monitored continuously.Positive blood cultures  are called to the RN and reported as soon as  they are identified.      Narrative:      Per Hospital Policy: Only change Specimen Src: to \"Line\" if  specified by physician order.  Right Hand    Blood Culture [[id number]] Collected: 03/31/23 1707    Order Status: Completed Specimen: Blood from Peripheral Updated: 04/01/23 0745     Significant Indicator NEG     Source BLD     Site PERIPHERAL     Culture Result No Growth  Note: Blood cultures are incubated for 5 days and  are monitored continuously.Positive blood cultures  are called to the RN and reported as soon as  they are identified.      Narrative:      Per Hospital Policy: Only change Specimen Src: to \"Line\" if  specified by physician order.  Right AC    Respiratory Panel By PCR [[id number]]     Order Status: Canceled Specimen: Respirate from Nasopharyngeal     COV-2, FLU A/B, AND RSV BY PCR (2-4 HOURS [facility]): Collect NP swab in VTM [[id number]] Collected: 03/31/23 2009    Order Status: Completed Specimen: Respirate from Nasopharyngeal Updated: 03/31/23 2107     Influenza virus A RNA Negative     Influenza virus B, PCR Negative     RSV, PCR Negative     SARS-CoV-2 by PCR NotDetected     Comment: [facility] providers: PLEASE REFER TO DE-ESCALATION AND RETESTING PROTOCOL  on [email or web address]    **The [employer] GeneXpert Xpress SARS-CoV-2 RT-PCR Test has been made  available for use under the Emergency Use Authorization (EUA) only.          SARS-CoV-2 Source NP Swab    Urinalysis [[id number]] Collected: 03/31/23 0000    Order Status: Canceled Specimen: Urine     BLOOD CULTURE [[id number]] Collected: 03/31/23 0000    Order Status: Canceled Specimen: Other from Peripheral     " BLOOD CULTURE [[id number]] Collected: 23 0000    Order Status: Canceled Specimen: Other from Peripheral             Assessment:  Tere Gallegos is a 72 y.o. female patient with history of  donor renal transplant in 2022 currently on tacrolimus, CellCept, prednisone 5 mg daily, CAD status post PCI, paroxysmal A-fib, chronic anticoagulation with Eliquis, hypertension, hypothyroidism, HFpEF, type 2 diabetes, hypertension, presented with approximately 1 day of acute onset of shortness of breath at rest with a pleuritic chest pain found to be febrile and hypoxemic, chest x-ray concerning for pneumonia versus fluid overload.  Procalcitonin is negative.     Regarding her renal transplant, patient is on tacrolimus, CellCept, prednisone 5 mg daily.  Recent positive BK virus viremia and then urine 3/3/2023, being monitored by [facility].  Recent CMV quant PCR from blood negative.     Pertinent Diagnoses:  Acute hypoxemic respiratory failure  Community-acquired pneumonia, multifocal  Status post renal transplant  CKD stage III  Immunosuppressed status  BK viremia  CAD  Type 2 diabetes    Plan:  -Okay to continue renally dosed IV cefepime for now.  CT chest shows bilateral lower lobe, right middle lobe groundglass opacities along with air bronchograms consistent with multifocal pneumonia  -MRSA nares swab negative, RVP negative.  Urine Strep and Legionella antigens pending  -BK viremia noted, being managed by transplant clinic in [facility].  Last decreased immunosuppression dose of CellCept, also due to leukopenia at that time. Monitor renal function, nephrology is on board.  Check renal function daily given fluctuating renal function, will need to adjust dose of cefepime and monitor for neurotoxicity    Discussed with internal medicine, Dr. Miller.  Continue to monitor clinically, patient remains on oxygen but appears to be trending down.  This illness poses threat to patient's life

## 2023-04-04 NOTE — PROGRESS NOTES
Hospital Medicine Daily Progress Note    Date of Service  4/4/2023    Chief Complaint  Tere Gallegos is a 72 y.o. female admitted 3/31/2023 with SOB    Hospital Course  73yo PMHx renal transplant Oct 2022, CKD III, CAD with hxof NOEMI to RCA, PAFib on apixaban, HTN, hypothyroidism, HFpEF, DM, HTN, FLS presenting with SOB.  Admitted with Dx of pneumonia.  COVID/RSV/Influenza neg however BK titer positive    Interval Problem Update  Feeling better.  Less SOB.  Cont's to have a productive cough but that is improving as well    AFebrile  Sinus 60s  -160s  On 4 LNC  I have discussed this patient's plan of care and discharge plan at IDT rounds today with Case Management, Nursing, Nursing leadership, and other members of the IDT team.    Consultants/Specialty  infectious disease and nephrology    Code Status  Full Code    Disposition  Patient is not medically cleared for discharge.   Anticipate discharge to to home with close outpatient follow-up.  I have placed the appropriate orders for post-discharge needs.    Review of Systems  Review of Systems   Constitutional:  Negative for chills and fever.   HENT:  Negative for nosebleeds and sore throat.    Eyes:  Negative for blurred vision and double vision.   Respiratory:  Positive for cough, sputum production and shortness of breath.    Cardiovascular:  Negative for chest pain, palpitations and leg swelling.   Gastrointestinal:  Negative for abdominal pain, diarrhea, nausea and vomiting.   Genitourinary:  Negative for dysuria and urgency.   Musculoskeletal:  Negative for back pain.   Skin:  Negative for rash.   Neurological:  Positive for weakness. Negative for dizziness, loss of consciousness and headaches.      Physical Exam  Temp:  [36.3 °C (97.4 °F)] 36.3 °C (97.4 °F)  Pulse:  [57-68] 64  Resp:  [12-57] 21  BP: (104-155)/(47-72) 148/65  SpO2:  [91 %-99 %] 93 %    Physical Exam  Constitutional:       General: She is not in acute distress.     Appearance:  Normal appearance. She is well-developed. She is not diaphoretic.   HENT:      Head: Normocephalic and atraumatic.   Neck:      Vascular: No JVD.   Cardiovascular:      Rate and Rhythm: Normal rate and regular rhythm.      Heart sounds: Murmur heard.   Pulmonary:      Effort: Pulmonary effort is normal. No respiratory distress.      Breath sounds: No stridor. Rhonchi present. No wheezing or rales.   Abdominal:      Palpations: Abdomen is soft.      Tenderness: There is no abdominal tenderness. There is no guarding or rebound.   Musculoskeletal:         General: No tenderness.      Right lower leg: No edema.      Left lower leg: No edema.   Skin:     General: Skin is warm and dry.      Findings: No rash.   Neurological:      General: No focal deficit present.      Mental Status: She is alert and oriented to person, place, and time.   Psychiatric:         Mood and Affect: Mood normal.         Behavior: Behavior normal.         Thought Content: Thought content normal.       Fluids    Intake/Output Summary (Last 24 hours) at 4/4/2023 0723  Last data filed at 4/4/2023 0530  Gross per 24 hour   Intake 720 ml   Output 2575 ml   Net -1855 ml       Laboratory  Recent Labs     04/02/23  0230 04/02/23  1352 04/03/23  0135 04/03/23  0910 04/04/23  0107   WBC 5.2  --  4.4*  --  4.5*   RBC 2.49*  --  2.61*  --  2.78*   HEMOGLOBIN 7.2*   < > 7.4* 8.0* 7.9*   HEMATOCRIT 22.3*   < > 23.7* 26.3* 25.7*   MCV 89.6  --  90.8  --  92.4   MCH 28.9  --  28.4  --  28.4   MCHC 32.3*  --  31.2*  --  30.7*   RDW 42.5  --  41.8  --  42.5   PLATELETCT 72*  --  93*  --  99*   MPV 12.5  --  12.4  --  12.4    < > = values in this interval not displayed.     Recent Labs     04/02/23  0230 04/03/23  0135 04/04/23  0107   SODIUM 138 136 136   POTASSIUM 4.4 4.4 4.8   CHLORIDE 106 105 107   CO2 21 21 20   GLUCOSE 118* 112* 124*   BUN 32* 31* 27*   CREATININE 1.87* 1.74* 1.34   CALCIUM 8.7 8.8 9.0                   Imaging  EC-ECHOCARDIOGRAM COMPLETE W/O  CONT   Final Result      CT-CHEST (THORAX) W/O   Final Result      1.  Multifocal pneumonia.   2.  Trace right pleural effusion.   3.  Megaly.   4.  Atherosclerosis      Fleischner Society pulmonary nodule recommendations:   Not Applicable         US-EXTREMITY VENOUS LOWER BILAT   Final Result      DX-CHEST-PORTABLE (1 VIEW)   Final Result      1.  Decreased pulmonary opacities, probably improved pulmonary edema   2.  Persistently enlarged cardiac silhouette      DX-CHEST-PORTABLE (1 VIEW)   Final Result      No significant interval change.      DX-CHEST-LIMITED (1 VIEW)   Final Result         Airspace opacities throughout the right lung, similar to prior.      DX-CHEST-PORTABLE (1 VIEW)   Final Result      1.  Moderate cardiomegaly.   2.  Central perivascular and interstitial pulmonary edema asymmetrically greater on the right with patchy airspace opacities. Superimposed pneumonia is not excluded.           Assessment/Plan  * Pneumonia due to infectious organism- (present on admission)  Assessment & Plan    Cultures neg  Currently on Cefepime  COVID-19/influenza/RSV negative.    MRSA negative, additional respiratory viral panel negative  The patient with positive BK virus titers, should typically not affect her lung function  CT positive for multifocal pneumonia  ID following  O2 requirement coming down    Anemia due to stage 3b chronic kidney disease (HCC)  Assessment & Plan  Significant drop since admission despite diuretics, recheck, rule out GI bleed  Hemoglobin stabilized, no definitive blood loss noted    Permanent atrial fibrillation (HCC)- (present on admission)  Assessment & Plan  Apparently recent development, has a monitor on currently, has been followed by cardiology  Ongoing anticoagulation  Beta-blockade for rate control      CKD (chronic kidney disease), stage III (HCC)  Assessment & Plan  CKD stage III of transplanted kidney  Nephrology following  Continue immunosuppression with caution  The patient  apparently with BK virus infection/reactivation  Consideration for kidney biopsy and treatment  Nephrology managing conjunction with transplant team  Daily BMP  Creat improving    Hypertensive urgency  Assessment & Plan  As outpt is on metoprolol SR 25, losartan 100, doxazosin 1, amlodipine 10  In house is on  -minoxidil 2.5  -metoprolol SR 25  -losartan 100  -amlodipine 10  .    Kidney transplant recipient  Assessment & Plan  The patient appears to be at baseline with her creatinine  Continue prednisone, tacrolimus, CellCept  Avoid nephrotoxins  Bicarb for acidosis    Acute respiratory failure with hypoxia due to volume overload, possible pneumonia (HCC)  Assessment & Plan    Etiology includes pulmonary edema and community-acquired pneumonia.  Patient is on apixaban, therefore PE seems to be less likely.    Treatment for presumed possible heart failure, volume overload as well as pneumonia  Significantly hypertensive initially, adjusting medication regimen, volume control  Continue treatment for multifocal  pneumonia  Monitor input and output and daily weight.       Chest pain- (present on admission)  Assessment & Plan  Probably angina secondary to uncontrolled blood pressure.  Patient denies worsening of chest pain on deep inspiration or with cough.  EKG has not changed, troponin is in indeterminate range 27.  Patient is on anticoagulation with apixaban.  Continue current volume control, blood pressure control  Monitor on telemetry, repeat troponin  TTE with preserved LVEF, no significant valvular or structural anomalies and no wall motion abnormalities        Acquired hypothyroidism- (present on admission)  Assessment & Plan  Continue levothyroxine    Chronic heart failure with preserved ejection fraction (HCC)- (present on admission)  Assessment & Plan  History of ejection fraction 55%, follow-up on cardiac function by echocardiogram  Maintain euvolemia  Blood pressure control  On BB, ARB  Good candidate for an  SGLT2i as outpt    Diabetes ([facility])  Assessment & Plan  Type 2 diabetes   A1c 6.0  As outpt is on lantus 5 and SSI         VTE prophylaxis: therapeutic anticoagulation with apixaban    I have performed a physical exam and reviewed and updated ROS and Plan today (4/4/2023). In review of yesterday's note (4/3/2023), there are no changes except as documented above.

## 2023-04-04 NOTE — PROGRESS NOTES
Nephrology Daily Progress Note    Date of Service  4/4/2023    Chief Complaint  72 y.o. female with ESRD, [place] [facility] [place]31/2023 with PNA    Interval Problem Update  4/2 -doing better  No acute events  No new complaints  SOB/cough better  Creat slightly worse from baseline - holding lasix  Leukopenia better  -restarted CellCept  4/3 patient is Welsh-speaking lady, communication was through   Still short of breath, no chest pain.  4/4 patient is doing better today, shortness of breath improved  Oxygen requirement is improving  Review of Systems  Review of Systems   Constitutional:  Negative for chills, fever and malaise/fatigue.   Respiratory:  Negative for cough and shortness of breath.    Cardiovascular:  Negative for chest pain and leg swelling.   Gastrointestinal:  Negative for nausea and vomiting.   Genitourinary:  Negative for dysuria, frequency and urgency.      Physical Exam  Temp:  [36.3 °C (97.4 °F)] 36.3 °C (97.4 °F)  Pulse:  [53-69] 65  Resp:  [12-57] 26  BP: (104-161)/(47-72) 161/71  SpO2:  [91 %-99 %] 98 %    Physical Exam  Vitals and nursing note reviewed.   Constitutional:       General: She is awake. She is not in acute distress.     Appearance: She is well-developed. She is ill-appearing. She is not diaphoretic.   HENT:      Head: Normocephalic and atraumatic.      Right Ear: External ear normal.      Left Ear: External ear normal.      Nose: Nose normal. No rhinorrhea.      Mouth/Throat:      Pharynx: No oropharyngeal exudate or posterior oropharyngeal erythema.   Eyes:      General: No scleral icterus.        Right eye: No discharge.         Left eye: No discharge.      Conjunctiva/sclera: Conjunctivae normal.   Neck:      Vascular: No carotid bruit.   Cardiovascular:      Rate and Rhythm: Normal rate and regular rhythm.      Heart sounds: No murmur heard.  Pulmonary:      Effort: Pulmonary effort is normal. No respiratory distress.      Breath sounds:  Rhonchi present.   Abdominal:      General: Abdomen is flat. There is no distension.      Palpations: Abdomen is soft. There is no mass.   Musculoskeletal:         General: No tenderness.      Cervical back: No rigidity. No muscular tenderness.      Right lower leg: No edema.      Left lower leg: No edema.   Skin:     General: Skin is warm and dry.      Coloration: Skin is not jaundiced.   Neurological:      General: No focal deficit present.      Mental Status: She is alert and oriented to person, place, and time. Mental status is at baseline.   Psychiatric:         Mood and Affect: Mood normal.         Behavior: Behavior normal.         Thought Content: Thought content normal.       Fluids    Intake/Output Summary (Last 24 hours) at 4/4/2023 1153  Last data filed at 4/4/2023 0530  Gross per 24 hour   Intake 480 ml   Output 975 ml   Net -495 ml         Laboratory  Recent Labs     04/02/23  0230 04/02/23  1352 04/03/23  0135 04/03/23  0910 04/04/23  0107   WBC 5.2  --  4.4*  --  4.5*   RBC 2.49*  --  2.61*  --  2.78*   HEMOGLOBIN 7.2*   < > 7.4* 8.0* 7.9*   HEMATOCRIT 22.3*   < > 23.7* 26.3* 25.7*   MCV 89.6  --  90.8  --  92.4   MCH 28.9  --  28.4  --  28.4   MCHC 32.3*  --  31.2*  --  30.7*   RDW 42.5  --  41.8  --  42.5   PLATELETCT 72*  --  93*  --  99*   MPV 12.5  --  12.4  --  12.4    < > = values in this interval not displayed.       Recent Labs     04/02/23  0230 04/03/23  0135 04/04/23  0107   SODIUM 138 136 136   POTASSIUM 4.4 4.4 4.8   CHLORIDE 106 105 107   CO2 21 21 20   GLUCOSE 118* 112* 124*   BUN 32* 31* 27*   CREATININE 1.87* 1.74* 1.34   CALCIUM 8.7 8.8 9.0           Recent Labs     04/02/23  0230   NTPROBNP 3820*             Imaging  EC-ECHOCARDIOGRAM COMPLETE W/O CONT   Final Result      CT-CHEST (THORAX) W/O   Final Result      1.  Multifocal pneumonia.   2.  Trace right pleural effusion.   3.  Megaly.   4.  Atherosclerosis      Fleischner Society pulmonary nodule recommendations:   Not  Applicable         US-EXTREMITY VENOUS LOWER BILAT   Final Result      DX-CHEST-PORTABLE (1 VIEW)   Final Result      1.  Decreased pulmonary opacities, probably improved pulmonary edema   2.  Persistently enlarged cardiac silhouette      DX-CHEST-PORTABLE (1 VIEW)   Final Result      No significant interval change.      DX-CHEST-LIMITED (1 VIEW)   Final Result         Airspace opacities throughout the right lung, similar to prior.      DX-CHEST-PORTABLE (1 VIEW)   Final Result      1.  Moderate cardiomegaly.   2.  Central perivascular and interstitial pulmonary edema asymmetrically greater on the right with patchy airspace opacities. Superimposed pneumonia is not excluded.           Assessment/Plan    The patient is a very pleasant 72-year-old female with   renal transplant from  donor, chronic kidney disease stage IIIB, admitted with pneumonia, hypoxia    1.  SASHA on chronic kidney disease, stage IIIB: No clear etiology   2. Renal transplant recipient.  Continue immunosuppression.  3.  Hypertension: Uncontrolled.  4  Anemia.  5.  Metabolic acidosis.  6.  Pneumonia  7.  BK nephropathy  RECOMMENDATIONS:  no acute need for HD  Decrease CellCept dose  Follow-up as an outpatient with the transplant team regarding BK nephropathy  Consider allograft biopsy  Start minoxidil  Renal diet  Daily BMP, CBC.  Renal dose all meds  Avoid nephrotoxins like NSAIDs.  Prognosis guarded.  Plan discussed in detail with Dr. Ewing and Dr. Myrick from [facility] [place]

## 2023-04-04 NOTE — CARE PLAN
The patient is Watcher - Medium risk of patient condition declining or worsening    Shift Goals  Clinical Goals: Wean O2, stable H&H  Patient Goals: Feel better  Family Goals: TEE    Progress made toward(s) clinical / shift goals:  Stable overnight. A&Ox4, Libyan speaking only.  utilized during assessments. Complained of difficulty falling asleep and restless legs. Pt denies SOB. O2 weaned to 3L oxymask. Hgb stable at 7.9.     Problem: Respiratory  Goal: Patient will achieve/maintain optimum respiratory ventilation and gas exchange  Outcome: Progressing     Problem: Hemodynamics - Pneumonia  Goal: Patient's hemodynamics, fluid balance and neurologic status will be stable or improve  Outcome: Progressing     Problem: Self Care  Goal: Patient will have the ability to perform ADLs independently or with assistance (bathe, groom, dress, toilet and feed)  Outcome: Progressing     Problem: Knowledge Deficit - Standard  Goal: Patient and family/care givers will demonstrate understanding of plan of care, disease process/condition, diagnostic tests and medications  Outcome: Progressing     Problem: Pain - Standard  Goal: Alleviation of pain or a reduction in pain to the patient’s comfort goal  Outcome: Progressing     Problem: Fall Risk  Goal: Patient will remain free from falls  Outcome: Progressing       Patient is not progressing towards the following goals:

## 2023-04-05 LAB
ALBUMIN SERPL BCP-MCNC: 3.1 G/DL (ref 3.2–4.9)
ANION GAP SERPL CALC-SCNC: 11 MMOL/L (ref 7–16)
BACTERIA BLD CULT: NORMAL
BACTERIA BLD CULT: NORMAL
BASOPHILS # BLD AUTO: 0.3 % (ref 0–1.8)
BASOPHILS # BLD: 0.01 K/UL (ref 0–0.12)
BUN SERPL-MCNC: 26 MG/DL (ref 8–22)
CALCIUM ALBUM COR SERPL-MCNC: 9.5 MG/DL (ref 8.5–10.5)
CALCIUM SERPL-MCNC: 8.8 MG/DL (ref 8.5–10.5)
CHLORIDE SERPL-SCNC: 104 MMOL/L (ref 96–112)
CO2 SERPL-SCNC: 21 MMOL/L (ref 20–33)
CREAT SERPL-MCNC: 1.31 MG/DL (ref 0.5–1.4)
EKG IMPRESSION: NORMAL
EOSINOPHIL # BLD AUTO: 0.04 K/UL (ref 0–0.51)
EOSINOPHIL NFR BLD: 1.3 % (ref 0–6.9)
ERYTHROCYTE [DISTWIDTH] IN BLOOD BY AUTOMATED COUNT: 41.6 FL (ref 35.9–50)
GFR SERPLBLD CREATININE-BSD FMLA CKD-EPI: 43 ML/MIN/1.73 M 2
GLUCOSE BLD STRIP.AUTO-MCNC: 125 MG/DL (ref 65–99)
GLUCOSE BLD STRIP.AUTO-MCNC: 152 MG/DL (ref 65–99)
GLUCOSE BLD STRIP.AUTO-MCNC: 163 MG/DL (ref 65–99)
GLUCOSE SERPL-MCNC: 93 MG/DL (ref 65–99)
HCT VFR BLD AUTO: 23.4 % (ref 37–47)
HGB BLD-MCNC: 7.3 G/DL (ref 12–16)
IMM GRANULOCYTES # BLD AUTO: 0.02 K/UL (ref 0–0.11)
IMM GRANULOCYTES NFR BLD AUTO: 0.7 % (ref 0–0.9)
IRON SATN MFR SERPL: 21 % (ref 15–55)
IRON SERPL-MCNC: 38 UG/DL (ref 40–170)
LYMPHOCYTES # BLD AUTO: 0.4 K/UL (ref 1–4.8)
LYMPHOCYTES NFR BLD: 13.3 % (ref 22–41)
MAGNESIUM SERPL-MCNC: 2.6 MG/DL (ref 1.5–2.5)
MCH RBC QN AUTO: 28.3 PG (ref 27–33)
MCHC RBC AUTO-ENTMCNC: 31.2 G/DL (ref 33.6–35)
MCV RBC AUTO: 90.7 FL (ref 81.4–97.8)
MONOCYTES # BLD AUTO: 0.32 K/UL (ref 0–0.85)
MONOCYTES NFR BLD AUTO: 10.6 % (ref 0–13.4)
NEUTROPHILS # BLD AUTO: 2.22 K/UL (ref 2–7.15)
NEUTROPHILS NFR BLD: 73.8 % (ref 44–72)
NRBC # BLD AUTO: 0 K/UL
NRBC BLD-RTO: 0 /100 WBC
PHOSPHATE SERPL-MCNC: 4 MG/DL (ref 2.5–4.5)
PLATELET # BLD AUTO: 105 K/UL (ref 164–446)
PMV BLD AUTO: 11.6 FL (ref 9–12.9)
POTASSIUM SERPL-SCNC: 4.3 MMOL/L (ref 3.6–5.5)
RBC # BLD AUTO: 2.58 M/UL (ref 4.2–5.4)
SIGNIFICANT IND 70042: NORMAL
SIGNIFICANT IND 70042: NORMAL
SITE SITE: NORMAL
SITE SITE: NORMAL
SODIUM SERPL-SCNC: 136 MMOL/L (ref 135–145)
SOURCE SOURCE: NORMAL
SOURCE SOURCE: NORMAL
TIBC SERPL-MCNC: 185 UG/DL (ref 250–450)
UIBC SERPL-MCNC: 147 UG/DL (ref 110–370)
WBC # BLD AUTO: 3 K/UL (ref 4.8–10.8)

## 2023-04-05 PROCEDURE — 700102 HCHG RX REV CODE 250 W/ 637 OVERRIDE(OP): Performed by: INTERNAL MEDICINE

## 2023-04-05 PROCEDURE — 99232 SBSQ HOSP IP/OBS MODERATE 35: CPT | Performed by: INTERNAL MEDICINE

## 2023-04-05 PROCEDURE — 93005 ELECTROCARDIOGRAM TRACING: CPT | Performed by: STUDENT IN AN ORGANIZED HEALTH CARE EDUCATION/TRAINING PROGRAM

## 2023-04-05 PROCEDURE — 85025 COMPLETE CBC W/AUTO DIFF WBC: CPT

## 2023-04-05 PROCEDURE — 700102 HCHG RX REV CODE 250 W/ 637 OVERRIDE(OP): Performed by: HOSPITALIST

## 2023-04-05 PROCEDURE — A9270 NON-COVERED ITEM OR SERVICE: HCPCS | Performed by: INTERNAL MEDICINE

## 2023-04-05 PROCEDURE — 83735 ASSAY OF MAGNESIUM: CPT

## 2023-04-05 PROCEDURE — 80069 RENAL FUNCTION PANEL: CPT

## 2023-04-05 PROCEDURE — 83550 IRON BINDING TEST: CPT

## 2023-04-05 PROCEDURE — 82962 GLUCOSE BLOOD TEST: CPT | Mod: 91

## 2023-04-05 PROCEDURE — 99233 SBSQ HOSP IP/OBS HIGH 50: CPT | Performed by: HOSPITALIST

## 2023-04-05 PROCEDURE — A9270 NON-COVERED ITEM OR SERVICE: HCPCS | Performed by: HOSPITALIST

## 2023-04-05 PROCEDURE — 93010 ELECTROCARDIOGRAM REPORT: CPT | Performed by: INTERNAL MEDICINE

## 2023-04-05 PROCEDURE — 700111 HCHG RX REV CODE 636 W/ 250 OVERRIDE (IP): Performed by: INTERNAL MEDICINE

## 2023-04-05 PROCEDURE — 700105 HCHG RX REV CODE 258: Performed by: INTERNAL MEDICINE

## 2023-04-05 PROCEDURE — 83540 ASSAY OF IRON: CPT

## 2023-04-05 PROCEDURE — 770000 HCHG ROOM/CARE - INTERMEDIATE ICU *

## 2023-04-05 RX ADMIN — Medication 5 MG: at 20:11

## 2023-04-05 RX ADMIN — SODIUM BICARBONATE 1300 MG: 650 TABLET ORAL at 20:11

## 2023-04-05 RX ADMIN — INSULIN HUMAN 1 UNITS: 100 INJECTION, SOLUTION PARENTERAL at 17:45

## 2023-04-05 RX ADMIN — INSULIN HUMAN 1 UNITS: 100 INJECTION, SOLUTION PARENTERAL at 20:11

## 2023-04-05 RX ADMIN — ATORVASTATIN CALCIUM 20 MG: 20 TABLET, FILM COATED ORAL at 20:11

## 2023-04-05 RX ADMIN — AMLODIPINE BESYLATE 10 MG: 10 TABLET ORAL at 05:18

## 2023-04-05 RX ADMIN — MINOXIDIL 2.5 MG: 2.5 TABLET ORAL at 05:19

## 2023-04-05 RX ADMIN — LOSARTAN POTASSIUM 100 MG: 50 TABLET, FILM COATED ORAL at 17:47

## 2023-04-05 RX ADMIN — OMEPRAZOLE 20 MG: 20 CAPSULE, DELAYED RELEASE ORAL at 05:18

## 2023-04-05 RX ADMIN — Medication 100 MG: at 05:18

## 2023-04-05 RX ADMIN — CEFEPIME 1 G: 1 INJECTION, POWDER, FOR SOLUTION INTRAMUSCULAR; INTRAVENOUS at 01:12

## 2023-04-05 RX ADMIN — INSULIN HUMAN 1 UNITS: 100 INJECTION, SOLUTION PARENTERAL at 12:23

## 2023-04-05 RX ADMIN — PREDNISONE 5 MG: 10 TABLET ORAL at 05:18

## 2023-04-05 RX ADMIN — SODIUM BICARBONATE 1300 MG: 650 TABLET ORAL at 14:58

## 2023-04-05 RX ADMIN — TACROLIMUS 1 MG: 1 CAPSULE ORAL at 05:20

## 2023-04-05 RX ADMIN — MYCOPHENOLATE MOFETIL 500 MG: 250 CAPSULE ORAL at 05:19

## 2023-04-05 RX ADMIN — TACROLIMUS 2 MG: 1 CAPSULE ORAL at 17:47

## 2023-04-05 RX ADMIN — SODIUM BICARBONATE 1300 MG: 650 TABLET ORAL at 08:26

## 2023-04-05 ASSESSMENT — ENCOUNTER SYMPTOMS
COUGH: 0
COUGH: 1
BACK PAIN: 0
DOUBLE VISION: 0
CHILLS: 0
FEVER: 0
DIZZINESS: 0
SHORTNESS OF BREATH: 1
PALPITATIONS: 0
BLURRED VISION: 0
WEAKNESS: 1
NAUSEA: 0
SHORTNESS OF BREATH: 0
LOSS OF CONSCIOUSNESS: 0
DIARRHEA: 0
ABDOMINAL PAIN: 0
VOMITING: 0
SPUTUM PRODUCTION: 1
HEADACHES: 0

## 2023-04-05 ASSESSMENT — PAIN DESCRIPTION - PAIN TYPE
TYPE: ACUTE PAIN
TYPE: ACUTE PAIN

## 2023-04-05 ASSESSMENT — FIBROSIS 4 INDEX: FIB4 SCORE: 2.8

## 2023-04-05 NOTE — DOCUMENTATION QUERY
"                                                                         Former[facility]                                                                       Query Response Note      PATIENT:               DIOR RAMIRES  ACCT #:                  [id number]  MRN:                     [id number]  :                      1950  ADMIT DATE:       3/31/2023 5:05 PM  DISCH DATE:          RESPONDING  PROVIDER #:        [id number]           QUERY TEXT:    Patient  presents with SOB and diagnosed with acute hypoxic respiratory failure due to volume overload, possible pneumonia.     Admission CXR with moderate cardiomegaly and pulmonary edema  Per documentation, patient has HFpEF. Patient BNP elevated at 3985 and \"treatment for presumed possible heart failure and volume overload,\" per  progress notes. Patient was started on IV Lasix 40mg BID (currently held for worsening kidney function)    Can the acuity of the heart failure be further specified based on the above clinical indicators and required treatments?    The patient's Clinical Indicators include:  71 yo F admitted with PNA and Acute respiratory failure with hypoxia    Clinical Indicators: Labs: NT proBNP 3985 ; Trop 26-->37  CXR: moderate cardiomegaly and Moderate central perivascular and interstitial pulmonary edema    3/31 ECHO: Normal systolic function, and diastolic function. EF 60%.    4/3  PN: \"Treatment for presumed possible heart failure, volume overload as well as pneumonia\"    Risk Factors: Age, Chronic HFpEF, SASHA on CKD, volume overload, kidney transplant recipeint    Treatments: IV Lasix 40 mg twice daily 3/31-, monitor labs, ECHO, nephrology consult, Monitor input and output and daily weight.      Contact me with any questions.    Thank you for your time and attention,  Kay Moreno RN, [email or web address]  Connect via email, [email or web address] or messenger.  Options provided:   -- Acute on Chronic   -- Chronic   -- Other Explanation " (please specify   -- Unable to determine      Query created by: Kay Moreno on 4/5/2023 7:15 AM    RESPONSE TEXT:    Chronic          Electronically signed by:  LOUISE WISE DO 4/5/2023 7:30 AM

## 2023-04-05 NOTE — CARE PLAN
The patient is Stable - Low risk of patient condition declining or worsening    Shift Goals  Clinical Goals: Stable hgb, wean O2  Patient Goals: Feel better  Family Goals: TEE    Progress made toward(s) clinical / shift goals:  Hgb remained stable    Patient is not progressing towards the following goals:      Problem: Care Map:  Optimal Outcome for the Pneumonia Patient  Goal: Collection and monitoring of appropriate tests and labs  Outcome: Progressing     Problem: Pain - Standard  Goal: Alleviation of pain or a reduction in pain to the patient’s comfort goal  Outcome: Progressing     Problem: Skin Integrity  Goal: Skin integrity is maintained or improved  Outcome: Progressing     Problem: Fall Risk  Goal: Patient will remain free from falls  Outcome: Progressing

## 2023-04-05 NOTE — PROGRESS NOTES
Infectious Disease Progress Note    Author: Toby Can M.D. Date & Time of service: 2023  10:45 AM    Chief Complaint:  Follow-up for shortness of breath    Interval History:  4/3 Tmax 99, white count is 4.4 today, tolerating antimicrobials, creatinine slightly improved to 1.74, magnesium 1.6,    patient remains afebrile, white count is 4.5, tolerating cefepime.  Creatinine is down to 1.34 today, AST and ALT within normal limits, magnesium 1.5   patient remains afebrile, white count is 3, tolerating renally dose cefepime, down to 3 L nasal cannula oxygen, creatinine is 1.31, albumin 3.1, magnesium 2.6, iron level 30.  Patient doing better overall    Labs Reviewed and Medications Reviewed.    Review of Systems:  Review of Systems   Respiratory:  Positive for cough and shortness of breath.      Hemodynamics:  Temp (24hrs), Av.7 °C (98 °F), Min:36.6 °C (97.9 °F), Max:36.7 °C (98 °F)  Temperature: 36.7 °C (98 °F), Monitored Temp: 36.9 °C (98.4 °F)  Pulse  Av.1  Min: 47  Max: 95   Blood Pressure : (!) 144/62       Physical Exam:  Physical Exam  Constitutional:       General: She is not in acute distress.     Appearance: She is ill-appearing.   HENT:      Nose:      Comments: Nasal cannula oxygen  Eyes:      General:         Right eye: No discharge.         Left eye: No discharge.   Cardiovascular:      Rate and Rhythm: Normal rate.   Pulmonary:      Effort: Pulmonary effort is normal. No respiratory distress.      Breath sounds: No stridor.   Musculoskeletal:         General: No swelling or tenderness.   Skin:     Findings: No erythema or rash.   Neurological:      Mental Status: She is oriented to person, place, and time.   Psychiatric:         Mood and Affect: Mood normal.         Behavior: Behavior normal.       Meds:    Current Facility-Administered Medications:     melatonin    thiamine    MD Alert...Adult ICU Electrolyte Replacement per Pharmacy    minoxidil    mycophenolate     omeprazole    sodium bicarbonate    senna-docusate **AND** polyethylene glycol/lytes **AND** magnesium hydroxide **AND** bisacodyl    Respiratory Therapy Consult    acetaminophen    ondansetron    ondansetron    labetalol    cefepime    guaiFENesin dextromethorphan    amLODIPine    [Held by provider] apixaban    atorvastatin    losartan    predniSONE    tacrolimus    oxyCODONE immediate-release    HYDROmorphone    insulin regular **AND** POC blood glucose manual result **AND** NOTIFY MD and PharmD **AND** Administer 20 grams of glucose (approximately 8 ounces of fruit juice) every 15 minutes PRN FSBG less than 70 mg/dL **AND** dextrose bolus    tacrolimus    [Held by provider] metoprolol SR    Labs:  Recent Labs     04/03/23 0135 04/03/23  0910 04/04/23  0107 04/05/23  0416   WBC 4.4*  --  4.5* 3.0*   RBC 2.61*  --  2.78* 2.58*   HEMOGLOBIN 7.4* 8.0* 7.9* 7.3*   HEMATOCRIT 23.7* 26.3* 25.7* 23.4*   MCV 90.8  --  92.4 90.7   MCH 28.4  --  28.4 28.3   RDW 41.8  --  42.5 41.6   PLATELETCT 93*  --  99* 105*   MPV 12.4  --  12.4 11.6   NEUTSPOLYS 86.80*  --   --  73.80*   LYMPHOCYTES 5.00*  --   --  13.30*   MONOCYTES 6.40  --   --  10.60   EOSINOPHILS 1.10  --   --  1.30   BASOPHILS 0.20  --   --  0.30       Recent Labs     04/03/23 0135 04/04/23 0107 04/05/23 0416   SODIUM 136 136 136   POTASSIUM 4.4 4.8 4.3   CHLORIDE 105 107 104   CO2 21 20 21   GLUCOSE 112* 124* 93   BUN 31* 27* 26*       Recent Labs     04/03/23 0135 04/04/23 0107 04/05/23 0416   ALBUMIN 3.3 3.5 3.1*   TBILIRUBIN 0.9 0.8  --    ALKPHOSPHAT 61 68  --    TOTPROTEIN 5.7* 6.0  --    ALTSGPT 19 20  --    ASTSGOT 16 18  --    CREATININE 1.74* 1.34 1.31         Imaging:  DX-CHEST-LIMITED (1 VIEW)    Result Date: 4/1/2023  3/31/2023 11:41 PM HISTORY/REASON FOR EXAM:  Shortness of Breath TECHNIQUE/EXAM DESCRIPTION AND NUMBER OF VIEWS: Single portable view of the chest. COMPARISON: 3/31/2023 FINDINGS: Airspace opacities throughout the right lung. No  pleural effusion. No pneumothorax. Stable cardiopericardial silhouette.     Airspace opacities throughout the right lung, similar to prior.    DX-CHEST-PORTABLE (1 VIEW)    Result Date: 4/2/2023 4/2/2023 12:12 AM HISTORY/REASON FOR EXAM:  Shortness of Breath TECHNIQUE/EXAM DESCRIPTION AND NUMBER OF VIEWS: Single portable view of the chest. COMPARISON: 4/1/2023 FINDINGS: Electronic device again projects over the LEFT chest. HEART: Stable size. There is atherosclerotic calcification in the aortic arch. LUNGS: There is mild peripheral interstitial prominence. There are perihilar opacities. There are bibasilar opacities. Lung volumes are low. PLEURA: No effusion or pneumothorax.     1.  Decreased pulmonary opacities, probably improved pulmonary edema 2.  Persistently enlarged cardiac silhouette    DX-CHEST-PORTABLE (1 VIEW)    Result Date: 4/1/2023 4/1/2023 8:34 AM HISTORY/REASON FOR EXAM:  Shortness of Breath. TECHNIQUE/EXAM DESCRIPTION AND NUMBER OF VIEWS: Single portable view of the chest. COMPARISON: One day prior FINDINGS: Cardiomediastinal silhouette is stable. Aortic calcified atherosclerotic plaque. Mild interstitial opacities could represent pulmonary edema and/or infection. There is similar consolidation in the right lung suspicious for pneumonia. No significant pleural effusion or pneumothorax. Surgical clips in the upper abdomen.     No significant interval change.    DX-CHEST-PORTABLE (1 VIEW)    Result Date: 3/31/2023  3/31/2023 5:25 PM HISTORY/REASON FOR EXAM:  possible sepsis. Atrial fibrillation. End-stage renal disease TECHNIQUE/EXAM DESCRIPTION AND NUMBER OF VIEWS: Single portable view of the chest. COMPARISON: 1 view chest 2/17/2023 FINDINGS: The soft tissues and bony structures are unremarkable. There is an electronic device projected over the aortic-pulmonic window. There are multiple clustered surgical clips in the left upper quadrant. There are surgical clips in the left arm presumably  representing dialysis access vascular procedures. Moderate cardiomegaly. Moderate central perivascular and interstitial pulmonary edema with asymmetrically greater airspace opacity on the right. There is no effusion or pneumothorax. The right hemidiaphragm is elevated as seen on prior exam consistent with eventration.     1.  Moderate cardiomegaly. 2.  Central perivascular and interstitial pulmonary edema asymmetrically greater on the right with patchy airspace opacities. Superimposed pneumonia is not excluded.    US-EXTREMITY VENOUS LOWER BILAT    Result Date: 2023   Vascular Laboratory  CONCLUSIONS  Bilateral lower extremity venous duplex imaging.  No deep venous thrombosis.  DIOR RAMIRES  Exam Date:     2023 07:30  Room #:     Inpatient  Priority:     Routine  Ht (in):             Wt (lb):  Ordering Physician:        NAYLA SPIVEY  Referring Physician:       [id number] Polk  Sonographer:               Susy Brice RVCHELITA  Study Type:                Complete Bilateral  Technical Quality:         Adequate  Age:    72    Gender:     F  MRN:    [id number]  :    1950      BSA:  Indications:     Localized swelling, mass and lump, unspecified lower limb,                   Edema, unspecified  CPT Codes:       64549  ICD Codes:       R22.40  R60.9  History:         Bilateral lower extremity swelling/edema. No prior study.  Limitations:  PROCEDURES:  Bilateral lower extremity venous duplex imaging.  The following venous structures were evaluated: common femoral, deep  femoral, proximal great saphenous, femoral, popliteal, peroneal, and  posterior tibial veins.  Serial compression, color, and spectral Doppler flow evaluations were  performed.  FINDINGS:  Bilateral lower extremities.  All veins demonstrate complete color filling and compressibility with  normal venous flow dynamics including spontaneous flow and respiratory  phasicity.  No deep venous  toan MCCRACKEN To  (Electronically Signed)  Final Date:      02 April 2023                   09:28      Micro:  Results       Procedure Component Value Units Date/Time    Urine Culture (New) [[id number]] Collected: 04/01/23 1616    Order Status: Completed Specimen: Urine Updated: 04/04/23 0614     Significant Indicator NEG     Source UR     Site -     Culture Result No growth at 48 hours.    Narrative:      Indication for culture:->Evaluation for sepsis without a  clear source of infection  Indication for culture:->Evaluation for sepsis without a    S. Aureus By PCR, Nasal Complete [[id number]] Collected: 04/02/23 1007    Order Status: Completed Specimen: Nasal from Respiratory Updated: 04/02/23 1429     Staph aureus by PCR Negative     MRSA by PCR Negative    Narrative:      Collected By: [id number] CASH  UTILIZATION ALERT: Has Flu/RSV/COVID PCR testing been  performed, resulted reviewed, and consulted with Infectious  Diseases or PICU Provider Teams?->Yes  Have you been in close contact with a person who is suspected  or known to be positive for COVID-19 within the last 30 days  (e.g. last seen that person < 30 days ago)->No    Respiratory Panel By PCR [[id number]] Collected: 04/02/23 1007    Order Status: Completed Specimen: Respirate from Nasopharyngeal Updated: 04/02/23 1131     Adenovirus, PCR Not Detected     SARS-CoV-2 (COVID-19) RNA by BRIGIDA NotDetected     Comment: [facility] providers: PLEASE REFER TO DE-ESCALATION AND RETESTING PROTOCOL  on [email or web address]    **The [employer] Respiratory Panel 2.1 (RP2.1) RT-PCR test is FDA authorized.          Coronavirus 229E, PCR Not Detected     Coronavirus HKU1, PCR Not Detected     Coronavirus NL63, PCR Not Detected     Coronavirus OC43, PCR Not Detected     Human Metapneumovirus, PCR Not Detected     Rhino/Enterovirus, PCR Not Detected     Influenza A, PCR Not Detected     Influenza B, PCR Not Detected     Parainfluenza 1, PCR Not Detected     Parainfluenza  2, PCR Not Detected     Parainfluenza 3, PCR Not Detected     Parainfluenza 4, PCR Not Detected     RSV (Respiratory Syncytial Virus), PCR Not Detected     Bordetella parapertussis (SQ0687), PCR Not Detected     Bordetella pertussis (ptxP), PCR Not Detected     Mycoplasma pneumoniae, PCR Not Detected     Chlamydia pneumoniae, PCR Not Detected    Narrative:      Collected By: [id number] CANTU SHAILESH  UTILIZATION ALERT: Has Flu/RSV/COVID PCR testing been  performed, resulted reviewed, and consulted with Infectious  Diseases or PICU Provider Teams?->Yes  Have you been in close contact with a person who is suspected  or known to be positive for COVID-19 within the last 30 days  (e.g. last seen that person < 30 days ago)->No    CULTURE RESPIRATORY W/ GRM STN [[id number]]     Order Status: Completed Specimen: Respirate from Sputum     GRAM STAIN [[id number]] Collected: 04/01/23 1647    Order Status: Completed Specimen: Respirate Updated: 04/01/23 185     Significant Indicator .     Source RESP     Site SPUTUM     Gram Stain Result Many epithelial cells.  Moderate WBCs.  Moderate mixed bacteria, no predominant organism seen.  Sputum Gram stain quality score is <1, probable  oropharyngeal contamination. Culture not performed.  Recollect if clinically indicated.      Narrative:      CALL  Barr  [employer] tel. [phone],  CALLED  [employer] tel. [phone] 04/01/2023, 18:57, Nurse called no  [id number] change  Preferably before first antibiotic dose - add Gram stain if  indicated  Preferably before first antibiotic dose - add Gram stain if    URINALYSIS [[id number]]  (Abnormal) Collected: 04/01/23 1616    Order Status: Completed Specimen: Urine, Han Cath Updated: 04/01/23 1753     Color Yellow     Character Clear     Specific Gravity 1.011     Ph 7.5     Glucose Negative mg/dL      Ketones Negative mg/dL      Protein Negative mg/dL      Bilirubin Negative     Urobilinogen, Urine 0.2     Nitrite Negative     Leukocyte Esterase  "Trace     Occult Blood Small     Micro Urine Req Microscopic    Narrative:      Collected By: [id number] WICHO DAI N    Culture Respiratory W/ GRM STN [[id number]] Collected: 04/01/23 1647    Order Status: Canceled Specimen: Sputum     Blood Culture [[id number]] Collected: 03/31/23 1707    Order Status: Completed Specimen: Blood from Peripheral Updated: 04/01/23 0745     Significant Indicator NEG     Source BLD     Site PERIPHERAL     Culture Result No Growth  Note: Blood cultures are incubated for 5 days and  are monitored continuously.Positive blood cultures  are called to the RN and reported as soon as  they are identified.      Narrative:      Per Hospital Policy: Only change Specimen Src: to \"Line\" if  specified by physician order.  Right Hand    Blood Culture [[id number]] Collected: 03/31/23 1707    Order Status: Completed Specimen: Blood from Peripheral Updated: 04/01/23 0745     Significant Indicator NEG     Source BLD     Site PERIPHERAL     Culture Result No Growth  Note: Blood cultures are incubated for 5 days and  are monitored continuously.Positive blood cultures  are called to the RN and reported as soon as  they are identified.      Narrative:      Per Hospital Policy: Only change Specimen Src: to \"Line\" if  specified by physician order.  Right AC    Respiratory Panel By PCR [[id number]]     Order Status: Canceled Specimen: Respirate from Nasopharyngeal     COV-2, FLU A/B, AND RSV BY PCR (2-4 HOURS [facility]): Collect NP swab in VTM [[id number]] Collected: 03/31/23 2009    Order Status: Completed Specimen: Respirate from Nasopharyngeal Updated: 03/31/23 2107     Influenza virus A RNA Negative     Influenza virus B, PCR Negative     RSV, PCR Negative     SARS-CoV-2 by PCR NotDetected     Comment: [facility] providers: PLEASE REFER TO DE-ESCALATION AND RETESTING PROTOCOL  on [email or web address]    **The [employer] GeneXpert Xpress SARS-CoV-2 RT-PCR Test has been made  available for use under the Emergency Use " Authorization (EUA) only.          SARS-CoV-2 Source NP Swab    Urinalysis [[id number]] Collected: 23 0000    Order Status: Canceled Specimen: Urine     BLOOD CULTURE [[id number]] Collected: 23 0000    Order Status: Canceled Specimen: Other from Peripheral     BLOOD CULTURE [[id number]] Collected: 23 0000    Order Status: Canceled Specimen: Other from Peripheral             Assessment:  Tere Gallegos is a 72 y.o. female patient with history of  donor renal transplant in 2022 currently on tacrolimus, CellCept, prednisone 5 mg daily, CAD status post PCI, paroxysmal A-fib, chronic anticoagulation with Eliquis, hypertension, hypothyroidism, HFpEF, type 2 diabetes, hypertension, presented with approximately 1 day of acute onset of shortness of breath at rest with a pleuritic chest pain found to be febrile and hypoxemic, chest x-ray concerning for pneumonia versus fluid overload.  Procalcitonin is negative.     Regarding her renal transplant, patient is on tacrolimus, CellCept, prednisone 5 mg daily.  Recent positive BK virus viremia and then urine 3/3/2023, being monitored by [facility].  Recent CMV quant PCR from blood negative.     Pertinent Diagnoses:  Acute hypoxemic respiratory failure  Community-acquired pneumonia, multifocal  Status post renal transplant  CKD stage III  Immunosuppressed status  BK viremia  CAD  Type 2 diabetes    Plan:  -Okay to continue renally dosed IV cefepime for now.  CT chest shows bilateral lower lobe, right middle lobe groundglass opacities along with air bronchograms consistent with multifocal pneumonia.  Patient's oxygen requirement has continued to improve.  Recommend a total 8-day course through 2023  -MRSA nares swab negative, RVP negative.  Urine Strep and Legionella antigens negative  -BK viremia noted, being managed by transplant clinic in [facility].  Last decreased immunosuppression dose of CellCept, also due to leukopenia at  that time. Monitor renal function, nephrology is on board.  Check renal function daily given fluctuating renal function, will need to adjust dose of cefepime and monitor for neurotoxicity    Discussed with internal medicine, Dr. Miller.  Continue to monitor clinically, ID will follow.  Please call us back if any change in clinical status.

## 2023-04-05 NOTE — CARE PLAN
The patient is Stable - Low risk of patient condition declining or worsening    Shift Goals  Clinical Goals: wean O2  Patient Goals: feel better  Family Goals: TEE    Progress made toward(s) clinical / shift goals:      Problem: Care Map:  Admission Optimal Outcome for the Heart Failure Patient  Goal: Admission:  Optimal Care of the heart failure patient  Description: Target End Date:  end of day 1  Outcome: Progressing     Problem: Care Map:  Day 1 Optimal Outcome for the Heart Failure Patient  Goal: Day 1:  Optimal Care of the heart failure patient  Description: Target End Date:  end of day 1  Outcome: Progressing     Problem: Care Map:  Day Before Discharge Optimal Outcome for the Heart Failure Patient  Goal: Day Before Discharge:  Optimal Care of the heart failure patient  Description: Target End Date:  Prior to discharge or change in level of care  Outcome: Progressing       Patient is not progressing towards the following goals:

## 2023-04-05 NOTE — PROGRESS NOTES
Hospital Medicine Daily Progress Note    Date of Service  4/5/2023    Chief Complaint  Tere Gallegos is a 72 y.o. female admitted 3/31/2023 with SOB    Hospital Course  71yo PMHx renal transplant Oct 2022, CKD III, CAD with hxof NOEMI to RCA, PAFib on apixaban, HTN, hypothyroidism, HFpEF, DM, HTN, FLS presenting with SOB.  Admitted with Dx of pneumonia.  COVID/RSV/Influenza neg however BK titer positive    Interval Problem Update  Feeling better again today.  Cough lessening today and not bothering her much.    Does note some bloody sputum; streaks with her cough  Has also had a bloody nose overnight    DW pt's daughter at bedside    AFebrile  Sinus 60-50s  SBP 140s  On 3 LNC    I have discussed this patient's plan of care and discharge plan at IDT rounds today with Case Management, Nursing, Nursing leadership, and other members of the IDT team.    Consultants/Specialty  infectious disease and nephrology    Code Status  Full Code    Disposition  Patient is not medically cleared for discharge.   Anticipate discharge to to home with close outpatient follow-up.  I have placed the appropriate orders for post-discharge needs.    Review of Systems  Review of Systems   Constitutional:  Negative for chills and fever.   Eyes:  Negative for blurred vision and double vision.   Respiratory:  Positive for cough, sputum production and shortness of breath.    Cardiovascular:  Negative for chest pain, palpitations and leg swelling.   Gastrointestinal:  Negative for abdominal pain, diarrhea, nausea and vomiting.   Genitourinary:  Negative for dysuria and urgency.   Musculoskeletal:  Negative for back pain.   Neurological:  Positive for weakness. Negative for dizziness, loss of consciousness and headaches.      Physical Exam  Temp:  [36.6 °C (97.9 °F)] 36.6 °C (97.9 °F)  Pulse:  [47-69] 47  Resp:  [6-28] 17  BP: (113-161)/(56-71) 148/64  SpO2:  [92 %-100 %] 94 %    Physical Exam  Constitutional:       General: She is not in  acute distress.     Appearance: Normal appearance. She is well-developed. She is not diaphoretic.   HENT:      Head: Normocephalic and atraumatic.   Eyes:      General: No scleral icterus.     Conjunctiva/sclera: Conjunctivae normal.   Neck:      Vascular: No JVD.   Cardiovascular:      Rate and Rhythm: Normal rate and regular rhythm.      Heart sounds: Murmur heard.   Pulmonary:      Effort: Pulmonary effort is normal. No respiratory distress.      Breath sounds: No stridor. Rhonchi present. No wheezing or rales.   Abdominal:      Palpations: Abdomen is soft.      Tenderness: There is no abdominal tenderness. There is no guarding or rebound.   Musculoskeletal:         General: No tenderness.      Right lower leg: No edema.      Left lower leg: No edema.   Skin:     General: Skin is warm and dry.      Coloration: Skin is not jaundiced.      Findings: No rash.   Neurological:      General: No focal deficit present.      Mental Status: She is alert and oriented to person, place, and time. Mental status is at baseline.   Psychiatric:         Mood and Affect: Mood normal.         Behavior: Behavior normal.         Thought Content: Thought content normal.       Fluids    Intake/Output Summary (Last 24 hours) at 4/5/2023 0613  Last data filed at 4/4/2023 2100  Gross per 24 hour   Intake 462.01 ml   Output 700 ml   Net -237.99 ml         Laboratory  Recent Labs     04/03/23 0135 04/03/23  0910 04/04/23 0107 04/05/23 0416   WBC 4.4*  --  4.5* 3.0*   RBC 2.61*  --  2.78* 2.58*   HEMOGLOBIN 7.4* 8.0* 7.9* 7.3*   HEMATOCRIT 23.7* 26.3* 25.7* 23.4*   MCV 90.8  --  92.4 90.7   MCH 28.4  --  28.4 28.3   MCHC 31.2*  --  30.7* 31.2*   RDW 41.8  --  42.5 41.6   PLATELETCT 93*  --  99* 105*   MPV 12.4  --  12.4 11.6       Recent Labs     04/03/23  0135 04/04/23 0107 04/05/23 0416   SODIUM 136 136 136   POTASSIUM 4.4 4.8 4.3   CHLORIDE 105 107 104   CO2 21 20 21   GLUCOSE 112* 124* 93   BUN 31* 27* 26*   CREATININE 1.74* 1.34  1.31   CALCIUM 8.8 9.0 8.8                     Imaging  EC-ECHOCARDIOGRAM COMPLETE W/O CONT   Final Result      CT-CHEST (THORAX) W/O   Final Result      1.  Multifocal pneumonia.   2.  Trace right pleural effusion.   3.  Megaly.   4.  Atherosclerosis      Fleischner Society pulmonary nodule recommendations:   Not Applicable         US-EXTREMITY VENOUS LOWER BILAT   Final Result      DX-CHEST-PORTABLE (1 VIEW)   Final Result      1.  Decreased pulmonary opacities, probably improved pulmonary edema   2.  Persistently enlarged cardiac silhouette      DX-CHEST-PORTABLE (1 VIEW)   Final Result      No significant interval change.      DX-CHEST-LIMITED (1 VIEW)   Final Result         Airspace opacities throughout the right lung, similar to prior.      DX-CHEST-PORTABLE (1 VIEW)   Final Result      1.  Moderate cardiomegaly.   2.  Central perivascular and interstitial pulmonary edema asymmetrically greater on the right with patchy airspace opacities. Superimposed pneumonia is not excluded.             Assessment/Plan  * Pneumonia due to infectious organism- (present on admission)  Assessment & Plan    Cultures neg  Currently on Cefepime  COVID-19/influenza/RSV negative.    MRSA negative, additional respiratory viral panel negative  The patient with positive BK virus titers, should typically not affect her lung function  CT positive for multifocal pneumonia  ID following  O2 requirement coming down    Anemia due to stage 3b chronic kidney disease (HCC)  Assessment & Plan  Significant drop since admission despite diuretics, recheck, rule out GI bleed  Hgb mid 7s  Pt is guaiac + though microscopic; no melena or BRBPR.  Pt has been having some hemoptysis and epistaxis however  Has had colonsocopies in the past which were reportedly unremarkable though pt can't tell me when the last one was  Holding apixaban for now with plan to resume once pneumonia if fully treated    Permanent atrial fibrillation (HCC)- (present on  admission)  Assessment & Plan  Apparently recent development, has a monitor on currently, has been followed by cardiology  Beta-blockade for rate control  Pt has had a slow drop in Hgb: Holding apixaban as guaiac +      CKD (chronic kidney disease), stage III (HCC)  Assessment & Plan  CKD stage III of transplanted kidney  Nephrology following  Continue immunosuppression with caution  The patient apparently with BK virus infection/reactivation  Nephrology managing conjunction with transplant team  Daily BMP  Creat improving    4/5  Creat has stabilized around 1.3  Cont to monitor  Renally dose medications as appropriate    Hypertensive urgency  Assessment & Plan  As outpt is on metoprolol SR 25, losartan 100, doxazosin 1, amlodipine 10  In house is on  -minoxidil 2.5  -metoprolol SR 25  -losartan 100  -amlodipine 10  .  On above regimen pressures have been in target overnight without PRN coverage    Kidney transplant recipient  Assessment & Plan  The patient appears to be at baseline with her creatinine  Continue prednisone, tacrolimus, CellCept  Avoid nephrotoxins  Bicarb for acidosis    Acute respiratory failure with hypoxia due to volume overload, possible pneumonia (HCC)  Assessment & Plan    Etiology includes pulmonary edema and community-acquired pneumonia.  Patient is on apixaban, therefore PE seems to be less likely.    Treatment for presumed possible heart failure, volume overload as well as pneumonia  Significantly hypertensive initially, adjusting medication regimen, volume control  Continue treatment for multifocal  pneumonia  Monitor input and output and daily weight.       Chest pain- (present on admission)  Assessment & Plan  Probably angina secondary to uncontrolled blood pressure.  Patient denies worsening of chest pain on deep inspiration or with cough.  EKG has not changed, troponin is in indeterminate range 27.  Patient is on anticoagulation with apixaban.  Continue current volume control, blood  pressure control  Monitor on telemetry, repeat troponin  TTE with preserved LVEF, no significant valvular or structural anomalies and no wall motion abnormalities        Acquired hypothyroidism- (present on admission)  Assessment & Plan  Continue levothyroxine    Chronic heart failure with preserved ejection fraction (HCC)- (present on admission)  Assessment & Plan  History of ejection fraction 55%, follow-up on cardiac function by echocardiogram  Maintain euvolemia  Blood pressure control  On BB, ARB  Good candidate for an SGLT2i as outpt    Diabetes ([facility])  Assessment & Plan  Type 2 diabetes   A1c 6.0  As outpt is on lantus 5 and SSI         VTE prophylaxis: therapeutic anticoagulation with apixaban    I have performed a physical exam and reviewed and updated ROS and Plan today (4/5/2023). In review of yesterday's note (4/4/2023), there are no changes except as documented above.

## 2023-04-05 NOTE — PROGRESS NOTES
Nephrology Daily Progress Note    Date of Service  4/5/2023    Chief Complaint  72 y.o. female with ESRD, s/p [place]RT in Oct 2022 in [facility] [place], admitted 3/31/2023 with PNA    Interval Problem Update  4/2 -doing better  No acute events  No new complaints  SOB/cough better  Creat slightly worse from baseline - holding lasix  Leukopenia better  -restarted CellCept  4/3 patient is Armenian-speaking lady, communication was through   Still short of breath, no chest pain.  4/4 patient is doing better today, shortness of breath improved  Oxygen requirement is improving  4/5 patient has no new complaints  Off oxygen supplement  Review of Systems  Review of Systems   Constitutional:  Negative for chills, fever and malaise/fatigue.   Respiratory:  Negative for cough and shortness of breath.    Cardiovascular:  Negative for chest pain and leg swelling.   Gastrointestinal:  Negative for nausea and vomiting.   Genitourinary:  Negative for dysuria, frequency and urgency.      Physical Exam  Temp:  [36.6 °C (97.9 °F)-36.7 °C (98 °F)] 36.7 °C (98 °F)  Pulse:  [47-65] 61  Resp:  [6-28] 24  BP: (113-148)/(56-66) 144/62  SpO2:  [92 %-100 %] 92 %    Physical Exam  Vitals and nursing note reviewed.   Constitutional:       General: She is awake. She is not in acute distress.     Appearance: She is well-developed. She is ill-appearing. She is not diaphoretic.   HENT:      Head: Normocephalic and atraumatic.      Right Ear: External ear normal.      Left Ear: External ear normal.      Nose: Nose normal. No rhinorrhea.      Mouth/Throat:      Pharynx: No oropharyngeal exudate or posterior oropharyngeal erythema.   Eyes:      General: No scleral icterus.        Right eye: No discharge.         Left eye: No discharge.      Conjunctiva/sclera: Conjunctivae normal.   Neck:      Vascular: No carotid bruit.   Cardiovascular:      Rate and Rhythm: Normal rate and regular rhythm.      Heart sounds: No murmur heard.  Pulmonary:      Effort:  Pulmonary effort is normal. No respiratory distress.      Breath sounds: Normal breath sounds.   Abdominal:      General: Abdomen is flat. There is no distension.      Palpations: Abdomen is soft. There is no mass.   Musculoskeletal:         General: No tenderness.      Cervical back: No rigidity. No muscular tenderness.      Right lower leg: No edema.      Left lower leg: No edema.   Skin:     General: Skin is warm and dry.      Coloration: Skin is not jaundiced.   Neurological:      General: No focal deficit present.      Mental Status: She is alert and oriented to person, place, and time. Mental status is at baseline.   Psychiatric:         Mood and Affect: Mood normal.         Behavior: Behavior normal.         Thought Content: Thought content normal.       Fluids    Intake/Output Summary (Last 24 hours) at 4/5/2023 1044  Last data filed at 4/5/2023 0800  Gross per 24 hour   Intake 988.16 ml   Output 1050 ml   Net -61.84 ml         Laboratory  Recent Labs     04/03/23  0135 04/03/23  0910 04/04/23  0107 04/05/23  0416   WBC 4.4*  --  4.5* 3.0*   RBC 2.61*  --  2.78* 2.58*   HEMOGLOBIN 7.4* 8.0* 7.9* 7.3*   HEMATOCRIT 23.7* 26.3* 25.7* 23.4*   MCV 90.8  --  92.4 90.7   MCH 28.4  --  28.4 28.3   MCHC 31.2*  --  30.7* 31.2*   RDW 41.8  --  42.5 41.6   PLATELETCT 93*  --  99* 105*   MPV 12.4  --  12.4 11.6       Recent Labs     04/03/23  0135 04/04/23  0107 04/05/23  0416   SODIUM 136 136 136   POTASSIUM 4.4 4.8 4.3   CHLORIDE 105 107 104   CO2 21 20 21   GLUCOSE 112* 124* 93   BUN 31* 27* 26*   CREATININE 1.74* 1.34 1.31   CALCIUM 8.8 9.0 8.8           No results for input(s): NTPROBNP in the last 72 hours.          Imaging  EC-ECHOCARDIOGRAM COMPLETE W/O CONT   Final Result      CT-CHEST (THORAX) W/O   Final Result      1.  Multifocal pneumonia.   2.  Trace right pleural effusion.   3.  Megaly.   4.  Atherosclerosis      Fleischner Society pulmonary nodule recommendations:   Not Applicable         US-EXTREMITY  VENOUS LOWER BILAT   Final Result      DX-CHEST-PORTABLE (1 VIEW)   Final Result      1.  Decreased pulmonary opacities, probably improved pulmonary edema   2.  Persistently enlarged cardiac silhouette      DX-CHEST-PORTABLE (1 VIEW)   Final Result      No significant interval change.      DX-CHEST-LIMITED (1 VIEW)   Final Result         Airspace opacities throughout the right lung, similar to prior.      DX-CHEST-PORTABLE (1 VIEW)   Final Result      1.  Moderate cardiomegaly.   2.  Central perivascular and interstitial pulmonary edema asymmetrically greater on the right with patchy airspace opacities. Superimposed pneumonia is not excluded.           Assessment/Plan    The patient is a very pleasant 72-year-old female with   renal transplant from  donor, chronic kidney disease stage IIIB, admitted with pneumonia, hypoxia    1.  SASHA on chronic kidney disease, stage IIIB.  2. Renal transplant recipient.  Continue immunosuppression.  3.  Hypertension: Uncontrolled.  4  Anemia possible GI bleed:  5.  Metabolic acidosis.  6.  Pneumonia  7.  BK nephropathy  8: Leukopenia: Possible side effect of CellCept  RECOMMENDATIONS:  no acute need for HD  Rule out GI bleed  Continue to monitor WBC count to evaluate the need to adjust CellCept dose  Renal diet  Daily BMP, CBC.  Renal dose all meds  Avoid nephrotoxins like NSAIDs.    Plan discussed in detail with Dr. Ewing

## 2023-04-06 LAB
ANION GAP SERPL CALC-SCNC: 11 MMOL/L (ref 7–16)
BUN SERPL-MCNC: 25 MG/DL (ref 8–22)
CALCIUM SERPL-MCNC: 8.9 MG/DL (ref 8.5–10.5)
CHLORIDE SERPL-SCNC: 108 MMOL/L (ref 96–112)
CO2 SERPL-SCNC: 22 MMOL/L (ref 20–33)
CREAT SERPL-MCNC: 1.53 MG/DL (ref 0.5–1.4)
ERYTHROCYTE [DISTWIDTH] IN BLOOD BY AUTOMATED COUNT: 40.6 FL (ref 35.9–50)
GFR SERPLBLD CREATININE-BSD FMLA CKD-EPI: 36 ML/MIN/1.73 M 2
GLUCOSE BLD STRIP.AUTO-MCNC: 114 MG/DL (ref 65–99)
GLUCOSE BLD STRIP.AUTO-MCNC: 160 MG/DL (ref 65–99)
GLUCOSE BLD STRIP.AUTO-MCNC: 161 MG/DL (ref 65–99)
GLUCOSE BLD STRIP.AUTO-MCNC: 256 MG/DL (ref 65–99)
GLUCOSE BLD STRIP.AUTO-MCNC: 259 MG/DL (ref 65–99)
GLUCOSE SERPL-MCNC: 91 MG/DL (ref 65–99)
HCT VFR BLD AUTO: 23.9 % (ref 37–47)
HGB BLD-MCNC: 7.5 G/DL (ref 12–16)
MCH RBC QN AUTO: 28.2 PG (ref 27–33)
MCHC RBC AUTO-ENTMCNC: 31.4 G/DL (ref 33.6–35)
MCV RBC AUTO: 89.8 FL (ref 81.4–97.8)
PLATELET # BLD AUTO: 112 K/UL (ref 164–446)
PMV BLD AUTO: 12.1 FL (ref 9–12.9)
POTASSIUM SERPL-SCNC: 4.5 MMOL/L (ref 3.6–5.5)
RBC # BLD AUTO: 2.66 M/UL (ref 4.2–5.4)
SODIUM SERPL-SCNC: 141 MMOL/L (ref 135–145)
WBC # BLD AUTO: 3.1 K/UL (ref 4.8–10.8)

## 2023-04-06 PROCEDURE — 80048 BASIC METABOLIC PNL TOTAL CA: CPT

## 2023-04-06 PROCEDURE — A9270 NON-COVERED ITEM OR SERVICE: HCPCS | Performed by: HOSPITALIST

## 2023-04-06 PROCEDURE — A9270 NON-COVERED ITEM OR SERVICE: HCPCS | Performed by: INTERNAL MEDICINE

## 2023-04-06 PROCEDURE — 85027 COMPLETE CBC AUTOMATED: CPT

## 2023-04-06 PROCEDURE — 700102 HCHG RX REV CODE 250 W/ 637 OVERRIDE(OP): Performed by: HOSPITALIST

## 2023-04-06 PROCEDURE — 82962 GLUCOSE BLOOD TEST: CPT | Mod: 91

## 2023-04-06 PROCEDURE — 700111 HCHG RX REV CODE 636 W/ 250 OVERRIDE (IP): Performed by: INTERNAL MEDICINE

## 2023-04-06 PROCEDURE — 770000 HCHG ROOM/CARE - INTERMEDIATE ICU *

## 2023-04-06 PROCEDURE — 99232 SBSQ HOSP IP/OBS MODERATE 35: CPT | Performed by: HOSPITALIST

## 2023-04-06 PROCEDURE — 700102 HCHG RX REV CODE 250 W/ 637 OVERRIDE(OP): Performed by: INTERNAL MEDICINE

## 2023-04-06 PROCEDURE — 99232 SBSQ HOSP IP/OBS MODERATE 35: CPT | Performed by: INTERNAL MEDICINE

## 2023-04-06 PROCEDURE — 700105 HCHG RX REV CODE 258: Performed by: INTERNAL MEDICINE

## 2023-04-06 RX ORDER — LEVOTHYROXINE SODIUM 0.07 MG/1
75 TABLET ORAL
Status: DISCONTINUED | OUTPATIENT
Start: 2023-04-06 | End: 2023-04-08 | Stop reason: HOSPADM

## 2023-04-06 RX ADMIN — PREDNISONE 5 MG: 10 TABLET ORAL at 05:59

## 2023-04-06 RX ADMIN — TACROLIMUS 2 MG: 1 CAPSULE ORAL at 17:35

## 2023-04-06 RX ADMIN — LOSARTAN POTASSIUM 100 MG: 50 TABLET, FILM COATED ORAL at 17:35

## 2023-04-06 RX ADMIN — INSULIN HUMAN 1 UNITS: 100 INJECTION, SOLUTION PARENTERAL at 12:14

## 2023-04-06 RX ADMIN — INSULIN HUMAN 3 UNITS: 100 INJECTION, SOLUTION PARENTERAL at 17:37

## 2023-04-06 RX ADMIN — DOCUSATE SODIUM 50 MG AND SENNOSIDES 8.6 MG 2 TABLET: 8.6; 5 TABLET, FILM COATED ORAL at 17:35

## 2023-04-06 RX ADMIN — Medication 100 MG: at 05:59

## 2023-04-06 RX ADMIN — Medication 5 MG: at 20:22

## 2023-04-06 RX ADMIN — TACROLIMUS 1 MG: 1 CAPSULE ORAL at 05:58

## 2023-04-06 RX ADMIN — LEVOTHYROXINE SODIUM 75 MCG: 0.07 TABLET ORAL at 12:13

## 2023-04-06 RX ADMIN — CEFEPIME 1 G: 1 INJECTION, POWDER, FOR SOLUTION INTRAMUSCULAR; INTRAVENOUS at 00:26

## 2023-04-06 RX ADMIN — CEFEPIME 1 G: 1 INJECTION, POWDER, FOR SOLUTION INTRAMUSCULAR; INTRAVENOUS at 23:42

## 2023-04-06 RX ADMIN — ATORVASTATIN CALCIUM 20 MG: 20 TABLET, FILM COATED ORAL at 20:22

## 2023-04-06 RX ADMIN — SODIUM BICARBONATE 1300 MG: 650 TABLET ORAL at 15:49

## 2023-04-06 RX ADMIN — MYCOPHENOLATE MOFETIL 500 MG: 250 CAPSULE ORAL at 05:58

## 2023-04-06 RX ADMIN — APIXABAN 2.5 MG: 2.5 TABLET, FILM COATED ORAL at 17:35

## 2023-04-06 RX ADMIN — INSULIN HUMAN 3 UNITS: 100 INJECTION, SOLUTION PARENTERAL at 20:22

## 2023-04-06 RX ADMIN — AMLODIPINE BESYLATE 10 MG: 10 TABLET ORAL at 05:59

## 2023-04-06 RX ADMIN — SODIUM BICARBONATE 1300 MG: 650 TABLET ORAL at 20:22

## 2023-04-06 RX ADMIN — SODIUM BICARBONATE 1300 MG: 650 TABLET ORAL at 08:10

## 2023-04-06 RX ADMIN — DOCUSATE SODIUM 50 MG AND SENNOSIDES 8.6 MG 2 TABLET: 8.6; 5 TABLET, FILM COATED ORAL at 05:59

## 2023-04-06 RX ADMIN — MINOXIDIL 2.5 MG: 2.5 TABLET ORAL at 05:58

## 2023-04-06 RX ADMIN — OMEPRAZOLE 20 MG: 20 CAPSULE, DELAYED RELEASE ORAL at 05:59

## 2023-04-06 ASSESSMENT — ENCOUNTER SYMPTOMS
HEADACHES: 0
CHILLS: 0
BLURRED VISION: 0
VOMITING: 0
SHORTNESS OF BREATH: 1
DOUBLE VISION: 0
ABDOMINAL PAIN: 0
SPUTUM PRODUCTION: 0
COUGH: 0
SHORTNESS OF BREATH: 0
DIARRHEA: 0
DIZZINESS: 0
NAUSEA: 0
WEAKNESS: 1
PALPITATIONS: 0
COUGH: 1
LOSS OF CONSCIOUSNESS: 0
BACK PAIN: 0
FEVER: 0

## 2023-04-06 ASSESSMENT — FIBROSIS 4 INDEX: FIB4 SCORE: 2.62

## 2023-04-06 ASSESSMENT — PAIN DESCRIPTION - PAIN TYPE
TYPE: ACUTE PAIN

## 2023-04-06 NOTE — PROGRESS NOTES
Infectious Disease Progress Note    Author: Toby Can M.D. Date & Time of service: 2023  8:30 AM    Chief Complaint:  Follow-up for shortness of breath    Interval History:  4/3 Tmax 99, white count is 4.4 today, tolerating antimicrobials, creatinine slightly improved to 1.74, magnesium 1.6,    patient remains afebrile, white count is 4.5, tolerating cefepime.  Creatinine is down to 1.34 today, AST and ALT within normal limits, magnesium 1.5   patient remains afebrile, white count is 3, tolerating renally dose cefepime, down to 3 L nasal cannula oxygen, creatinine is 1.31, albumin 3.1, magnesium 2.6, iron level 30.  Patient doing better overall   Patient remains afebrile, white count is 3.1, tolerating cefepime, On 3 L nasal cannula oxygen.  Creatinine is 1.53, point-of-care glucose 114.  Cough continues to improve    Labs Reviewed and Medications Reviewed.    Review of Systems:  Review of Systems   Respiratory:  Positive for cough and shortness of breath.      Hemodynamics:  Temp (24hrs), Av.3 °C (97.4 °F), Min:36.1 °C (97 °F), Max:36.6 °C (97.8 °F)  Temperature: 36.2 °C (97.1 °F)  Pulse  Av.6  Min: 47  Max: 95   Blood Pressure : (!) 150/67       Physical Exam:  Physical Exam  Constitutional:       General: She is not in acute distress.     Appearance: She is ill-appearing.   HENT:      Nose:      Comments: Nasal cannula oxygen  Eyes:      General:         Right eye: No discharge.         Left eye: No discharge.   Cardiovascular:      Rate and Rhythm: Normal rate.   Pulmonary:      Effort: Pulmonary effort is normal. No respiratory distress.      Breath sounds: No stridor.   Musculoskeletal:         General: No swelling or tenderness.   Skin:     Findings: No erythema or rash.   Neurological:      Mental Status: She is oriented to person, place, and time.   Psychiatric:         Mood and Affect: Mood normal.         Behavior: Behavior normal.       Meds:    Current  Facility-Administered Medications:     melatonin    thiamine    MD Alert...Adult ICU Electrolyte Replacement per Pharmacy    minoxidil    mycophenolate    omeprazole    sodium bicarbonate    senna-docusate **AND** polyethylene glycol/lytes **AND** magnesium hydroxide **AND** bisacodyl    Respiratory Therapy Consult    acetaminophen    ondansetron    ondansetron    labetalol    cefepime    guaiFENesin dextromethorphan    amLODIPine    [Held by provider] apixaban    atorvastatin    losartan    predniSONE    tacrolimus    oxyCODONE immediate-release    HYDROmorphone    insulin regular **AND** POC blood glucose manual result **AND** NOTIFY MD and PharmD **AND** Administer 20 grams of glucose (approximately 8 ounces of fruit juice) every 15 minutes PRN FSBG less than 70 mg/dL **AND** dextrose bolus    tacrolimus    [Held by provider] metoprolol SR    Labs:  Recent Labs     04/04/23 0107 04/05/23 0416 04/06/23  0030   WBC 4.5* 3.0* 3.1*   RBC 2.78* 2.58* 2.66*   HEMOGLOBIN 7.9* 7.3* 7.5*   HEMATOCRIT 25.7* 23.4* 23.9*   MCV 92.4 90.7 89.8   MCH 28.4 28.3 28.2   RDW 42.5 41.6 40.6   PLATELETCT 99* 105* 112*   MPV 12.4 11.6 12.1   NEUTSPOLYS  --  73.80*  --    LYMPHOCYTES  --  13.30*  --    MONOCYTES  --  10.60  --    EOSINOPHILS  --  1.30  --    BASOPHILS  --  0.30  --        Recent Labs     04/04/23 0107 04/05/23 0416 04/06/23  0030   SODIUM 136 136 141   POTASSIUM 4.8 4.3 4.5   CHLORIDE 107 104 108   CO2 20 21 22   GLUCOSE 124* 93 91   BUN 27* 26* 25*       Recent Labs     04/04/23 0107 04/05/23 0416 04/06/23  0030   ALBUMIN 3.5 3.1*  --    TBILIRUBIN 0.8  --   --    ALKPHOSPHAT 68  --   --    TOTPROTEIN 6.0  --   --    ALTSGPT 20  --   --    ASTSGOT 18  --   --    CREATININE 1.34 1.31 1.53*         Imaging:  DX-CHEST-LIMITED (1 VIEW)    Result Date: 4/1/2023  3/31/2023 11:41 PM HISTORY/REASON FOR EXAM:  Shortness of Breath TECHNIQUE/EXAM DESCRIPTION AND NUMBER OF VIEWS: Single portable view of the chest.  COMPARISON: 3/31/2023 FINDINGS: Airspace opacities throughout the right lung. No pleural effusion. No pneumothorax. Stable cardiopericardial silhouette.     Airspace opacities throughout the right lung, similar to prior.    DX-CHEST-PORTABLE (1 VIEW)    Result Date: 4/2/2023 4/2/2023 12:12 AM HISTORY/REASON FOR EXAM:  Shortness of Breath TECHNIQUE/EXAM DESCRIPTION AND NUMBER OF VIEWS: Single portable view of the chest. COMPARISON: 4/1/2023 FINDINGS: Electronic device again projects over the LEFT chest. HEART: Stable size. There is atherosclerotic calcification in the aortic arch. LUNGS: There is mild peripheral interstitial prominence. There are perihilar opacities. There are bibasilar opacities. Lung volumes are low. PLEURA: No effusion or pneumothorax.     1.  Decreased pulmonary opacities, probably improved pulmonary edema 2.  Persistently enlarged cardiac silhouette    DX-CHEST-PORTABLE (1 VIEW)    Result Date: 4/1/2023 4/1/2023 8:34 AM HISTORY/REASON FOR EXAM:  Shortness of Breath. TECHNIQUE/EXAM DESCRIPTION AND NUMBER OF VIEWS: Single portable view of the chest. COMPARISON: One day prior FINDINGS: Cardiomediastinal silhouette is stable. Aortic calcified atherosclerotic plaque. Mild interstitial opacities could represent pulmonary edema and/or infection. There is similar consolidation in the right lung suspicious for pneumonia. No significant pleural effusion or pneumothorax. Surgical clips in the upper abdomen.     No significant interval change.    DX-CHEST-PORTABLE (1 VIEW)    Result Date: 3/31/2023  3/31/2023 5:25 PM HISTORY/REASON FOR EXAM:  possible sepsis. Atrial fibrillation. End-stage renal disease TECHNIQUE/EXAM DESCRIPTION AND NUMBER OF VIEWS: Single portable view of the chest. COMPARISON: 1 view chest 2/17/2023 FINDINGS: The soft tissues and bony structures are unremarkable. There is an electronic device projected over the aortic-pulmonic window. There are multiple clustered surgical clips in the  left upper quadrant. There are surgical clips in the left arm presumably representing dialysis access vascular procedures. Moderate cardiomegaly. Moderate central perivascular and interstitial pulmonary edema with asymmetrically greater airspace opacity on the right. There is no effusion or pneumothorax. The right hemidiaphragm is elevated as seen on prior exam consistent with eventration.     1.  Moderate cardiomegaly. 2.  Central perivascular and interstitial pulmonary edema asymmetrically greater on the right with patchy airspace opacities. Superimposed pneumonia is not excluded.    US-EXTREMITY VENOUS LOWER BILAT    Result Date: 2023   Vascular Laboratory  CONCLUSIONS  Bilateral lower extremity venous duplex imaging.  No deep venous thrombosis.  [facility]AMPOVER[facility]ICHOLS DIOR  Exam Date:     2023 07:30  Room #:     Inpatient  Priority:     Routine  Ht (in):             Wt (lb):  Ordering Physician:        NAYLA SPIVEY  Referring Physician:       [id number] Leal  Sonographer:               Susy Brice RVT  Study Type:                Complete Bilateral  Technical Quality:         Adequate  Age:    72    Gender:     F  MRN:    [id number]  :    1950      BSA:  Indications:     Localized swelling, mass and lump, unspecified lower limb,                   Edema, unspecified  CPT Codes:       98753  ICD Codes:       R22.40  R60.9  History:         Bilateral lower extremity swelling/edema. No prior study.  Limitations:  PROCEDURES:  Bilateral lower extremity venous duplex imaging.  The following venous structures were evaluated: common femoral, deep  femoral, proximal great saphenous, femoral, popliteal, peroneal, and  posterior tibial veins.  Serial compression, color, and spectral Doppler flow evaluations were  performed.  FINDINGS:  Bilateral lower extremities.  All veins demonstrate complete color filling and compressibility with  normal venous flow dynamics  "including spontaneous flow and respiratory  phasicity.  No deep venous thrombosis.  Shailesh MCCRACKEN To  (Electronically Signed)  Final Date:      02 April 2023                   09:28      Micro:  Results       Procedure Component Value Units Date/Time    Blood Culture [[id number]] Collected: 03/31/23 1707    Order Status: Completed Specimen: Blood from Peripheral Updated: 04/05/23 1900     Significant Indicator NEG     Source BLD     Site PERIPHERAL     Culture Result No growth after 5 days of incubation.    Narrative:      Per Hospital Policy: Only change Specimen Src: to \"Line\" if  specified by physician order.  Right Hand    Blood Culture [[id number]] Collected: 03/31/23 1707    Order Status: Completed Specimen: Blood from Peripheral Updated: 04/05/23 1900     Significant Indicator NEG     Source BLD     Site PERIPHERAL     Culture Result No growth after 5 days of incubation.    Narrative:      Per Hospital Policy: Only change Specimen Src: to \"Line\" if  specified by physician order.  Right AC    Urine Culture (New) [[id number]] Collected: 04/01/23 1616    Order Status: Completed Specimen: Urine Updated: 04/04/23 0614     Significant Indicator NEG     Source UR     Site -     Culture Result No growth at 48 hours.    Narrative:      Indication for culture:->Evaluation for sepsis without a  clear source of infection  Indication for culture:->Evaluation for sepsis without a    S. Aureus By PCR, Nasal Complete [[id number]] Collected: 04/02/23 1007    Order Status: Completed Specimen: Nasal from Respiratory Updated: 04/02/23 1429     Staph aureus by PCR Negative     MRSA by PCR Negative    Narrative:      Collected By: [id number] CANTU SHAILESH  UTILIZATION ALERT: Has Flu/RSV/COVID PCR testing been  performed, resulted reviewed, and consulted with Infectious  Diseases or PICU Provider Teams?->Yes  Have you been in close contact with a person who is suspected  or known to be positive for COVID-19 within the last 30 days  (e.g. " last seen that person < 30 days ago)->No    Respiratory Panel By PCR [[id number]] Collected: 04/02/23 1007    Order Status: Completed Specimen: Respirate from Nasopharyngeal Updated: 04/02/23 1131     Adenovirus, PCR Not Detected     SARS-CoV-2 (COVID-19) RNA by BRIGIDA NotDetected     Comment: [facility] providers: PLEASE REFER TO DE-ESCALATION AND RETESTING PROTOCOL  on [email or web address]    **The [employer] Respiratory Panel 2.1 (RP2.1) RT-PCR test is FDA authorized.          Coronavirus 229E, PCR Not Detected     Coronavirus HKU1, PCR Not Detected     Coronavirus NL63, PCR Not Detected     Coronavirus OC43, PCR Not Detected     Human Metapneumovirus, PCR Not Detected     Rhino/Enterovirus, PCR Not Detected     Influenza A, PCR Not Detected     Influenza B, PCR Not Detected     Parainfluenza 1, PCR Not Detected     Parainfluenza 2, PCR Not Detected     Parainfluenza 3, PCR Not Detected     Parainfluenza 4, PCR Not Detected     RSV (Respiratory Syncytial Virus), PCR Not Detected     Bordetella parapertussis (JA9111), PCR Not Detected     Bordetella pertussis (ptxP), PCR Not Detected     Mycoplasma pneumoniae, PCR Not Detected     Chlamydia pneumoniae, PCR Not Detected    Narrative:      Collected By: [id number] CANTU SHAILESH  UTILIZATION ALERT: Has Flu/RSV/COVID PCR testing been  performed, resulted reviewed, and consulted with Infectious  Diseases or PICU Provider Teams?->Yes  Have you been in close contact with a person who is suspected  or known to be positive for COVID-19 within the last 30 days  (e.g. last seen that person < 30 days ago)->No    CULTURE RESPIRATORY W/ GRM STN [[id number]]     Order Status: Completed Specimen: Respirate from Sputum     GRAM STAIN [[id number]] Collected: 04/01/23 1647    Order Status: Completed Specimen: Respirate Updated: 04/01/23 1859     Significant Indicator .     Source RESP     Site SPUTUM     Gram Stain Result Many epithelial cells.  Moderate WBCs.  Moderate mixed bacteria, no predominant  organism seen.  Sputum Gram stain quality score is <1, probable  oropharyngeal contamination. Culture not performed.  Recollect if clinically indicated.      Narrative:      CALL  [facility]  [facility] tel. [phone],  CALLED  [facility] tel. [phone] 04/01/2023, 18:57, Nurse called no  [id number] change  Preferably before first antibiotic dose - add Gram stain if  indicated  Preferably before first antibiotic dose - add Gram stain if    URINALYSIS [[id number]]  (Abnormal) Collected: 04/01/23 1616    Order Status: Completed Specimen: Urine, Han Cath Updated: 04/01/23 1755     Color Yellow     Character Clear     Specific Gravity 1.011     Ph 7.5     Glucose Negative mg/dL      Ketones Negative mg/dL      Protein Negative mg/dL      Bilirubin Negative     Urobilinogen, Urine 0.2     Nitrite Negative     Leukocyte Esterase Trace     Occult Blood Small     Micro Urine Req Microscopic    Narrative:      Collected By: [id number] WICHO MCCRACKEN    Culture Respiratory W/ GRM STN [[id number]] Collected: 04/01/23 1647    Order Status: Canceled Specimen: Sputum     Respiratory Panel By PCR [[id number]]     Order Status: Canceled Specimen: Respirate from Nasopharyngeal     COV-2, FLU A/B, AND RSV BY PCR (2-4 HOURS CEPHEID): Collect NP swab in VTM [[id number]] Collected: 03/31/23 2009    Order Status: Completed Specimen: Respirate from Nasopharyngeal Updated: 03/31/23 2107     Influenza virus A RNA Negative     Influenza virus B, PCR Negative     RSV, PCR Negative     SARS-CoV-2 by PCR NotDetected     Comment: [facility] providers: PLEASE REFER TO DE-ESCALATION AND RETESTING PROTOCOL  on [email or web address]    **The [employer] GeneXpert Xpress SARS-CoV-2 RT-PCR Test has been made  available for use under the Emergency Use Authorization (EUA) only.          SARS-CoV-2 Source NP Swab    Urinalysis [[id number]] Collected: 03/31/23 0000    Order Status: Canceled Specimen: Urine     BLOOD CULTURE [[id number]] Collected: 03/31/23 0000    Order  Status: Canceled Specimen: Other from Peripheral     BLOOD CULTURE [[id number]] Collected: 23 0000    Order Status: Canceled Specimen: Other from Peripheral             Assessment:  Tere Gallegos is a 72 y.o. female patient with history of  donor renal transplant in 2022 currently on tacrolimus, CellCept, prednisone 5 mg daily, CAD status post PCI, paroxysmal A-fib, chronic anticoagulation with Eliquis, hypertension, hypothyroidism, HFpEF, type 2 diabetes, hypertension, presented with approximately 1 day of acute onset of shortness of breath at rest with a pleuritic chest pain found to be febrile and hypoxemic, chest x-ray concerning for pneumonia versus fluid overload.  Procalcitonin is negative.     Regarding her renal transplant, patient is on tacrolimus, CellCept, prednisone 5 mg daily.  Recent positive BK virus viremia and then urine 3/3/2023, being monitored by [facility].  Recent CMV quant PCR from blood negative.     Pertinent Diagnoses:  Acute hypoxemic respiratory failure  Community-acquired pneumonia, multifocal  Status post renal transplant  CKD stage III  Immunosuppressed status  BK viremia  CAD  Type 2 diabetes    Plan:  -Okay to continue renally dosed IV cefepime for now.  CT chest shows bilateral lower lobe, right middle lobe groundglass opacities along with air bronchograms consistent with multifocal pneumonia.  Patient's oxygen requirement has continued to improve.  Recommend a total 8-day course through 2023.  Anticipate will stay in the hospital through this course  -MRSA nares swab negative, RVP negative.  Urine Strep and Legionella antigens negative  -BK viremia noted, being managed by transplant clinic in [facility].  Last decreased immunosuppression dose of CellCept, also due to leukopenia at that time. Monitor renal function, nephrology is on board.  Check renal function daily given fluctuating renal function, will need to adjust dose of cefepime  and monitor for neurotoxicity    Discussed with internal medicine, Dr. Miller.  Continue to monitor clinically, ID will sign off.  Please call us back if any change in clinical status.

## 2023-04-06 NOTE — CARE PLAN
The patient is Stable - Low risk of patient condition declining or worsening    Shift Goals  Clinical Goals: Mobilize, VSS  Patient Goals: feel better  Family Goals: TEE    Progress made toward(s) clinical / shift goals:  VSS, pt on room air      Problem: Care Map:  Admission Optimal Outcome for the Heart Failure Patient  Goal: Admission:  Optimal Care of the heart failure patient  Outcome: Progressing     Problem: Care Map:  Optimal Outcome for the Pneumonia Patient  Goal: Collection and monitoring of appropriate tests and labs  Outcome: Progressing     Problem: Respiratory  Goal: Patient will achieve/maintain optimum respiratory ventilation and gas exchange  Outcome: Progressing     Problem: Risk for Aspiration  Goal: Patient's risk for aspiration will be absent or decrease  Outcome: Progressing     Problem: Pain - Standard  Goal: Alleviation of pain or a reduction in pain to the patient’s comfort goal  Outcome: Progressing     Problem: Skin Integrity  Goal: Skin integrity is maintained or improved  Outcome: Progressing     Problem: Fall Risk  Goal: Patient will remain free from falls  Outcome: Progressing

## 2023-04-06 NOTE — PROGRESS NOTES
Hospital Medicine Daily Progress Note    Date of Service  4/6/2023    Chief Complaint  Tere Gallegos is a 72 y.o. female admitted 3/31/2023 with SOB    Hospital Course  71yo PMHx renal transplant Oct 2022, CKD III, CAD with hxof NOEMI to RCA, PAFib on apixaban, HTN, hypothyroidism, HFpEF, DM, HTN, FLS presenting with SOB.  Admitted with Dx of pneumonia.  COVID/RSV/Influenza neg however BK titer positive    Interval Problem Update  Pt states her cough is gone.  No more bloody nose either.  Feeling well though eager to go home    DW pt's daughter at bedside    AFebrile  Sinus 60-70s  SBP 140s  On 2 LNC    I have discussed this patient's plan of care and discharge plan at IDT rounds today with Case Management, Nursing, Nursing leadership, and other members of the IDT team.    Consultants/Specialty  infectious disease and nephrology    Code Status  Full Code    Disposition  Patient is not medically cleared for discharge.   Anticipate discharge to to home with close outpatient follow-up.  I have placed the appropriate orders for post-discharge needs.    Review of Systems  Review of Systems   Constitutional:  Negative for chills and fever.   Eyes:  Negative for blurred vision and double vision.   Respiratory:  Positive for shortness of breath. Negative for cough and sputum production.    Cardiovascular:  Negative for chest pain, palpitations and leg swelling.   Gastrointestinal:  Negative for abdominal pain, diarrhea, nausea and vomiting.   Genitourinary:  Negative for dysuria and urgency.   Musculoskeletal:  Negative for back pain.   Neurological:  Positive for weakness. Negative for dizziness, loss of consciousness and headaches.      Physical Exam  Temp:  [36.1 °C (97 °F)-36.6 °C (97.8 °F)] 36.6 °C (97.8 °F)  Pulse:  [52-69] 69  Resp:  [14-20] 18  BP: (134-159)/(61-70) 150/67  SpO2:  [91 %-99 %] 93 %    Physical Exam  Constitutional:       General: She is not in acute distress.     Appearance: Normal  appearance. She is well-developed. She is not diaphoretic.   HENT:      Head: Normocephalic and atraumatic.   Eyes:      General: No scleral icterus.     Conjunctiva/sclera: Conjunctivae normal.   Neck:      Vascular: No JVD.   Cardiovascular:      Rate and Rhythm: Normal rate and regular rhythm.      Heart sounds: Murmur heard.   Pulmonary:      Effort: Pulmonary effort is normal. No respiratory distress.      Breath sounds: No stridor. No wheezing, rhonchi or rales.   Abdominal:      Palpations: Abdomen is soft.      Tenderness: There is no abdominal tenderness. There is no guarding or rebound.   Musculoskeletal:         General: No tenderness.      Right lower leg: No edema.      Left lower leg: No edema.   Skin:     General: Skin is warm and dry.      Coloration: Skin is not jaundiced.      Findings: No rash.   Neurological:      General: No focal deficit present.      Mental Status: She is alert and oriented to person, place, and time. Mental status is at baseline.   Psychiatric:         Mood and Affect: Mood normal.         Behavior: Behavior normal.         Thought Content: Thought content normal.       Fluids    Intake/Output Summary (Last 24 hours) at 4/6/2023 0704  Last data filed at 4/5/2023 2000  Gross per 24 hour   Intake 437.27 ml   Output --   Net 437.27 ml         Laboratory  Recent Labs     04/04/23 0107 04/05/23 0416 04/06/23  0030   WBC 4.5* 3.0* 3.1*   RBC 2.78* 2.58* 2.66*   HEMOGLOBIN 7.9* 7.3* 7.5*   HEMATOCRIT 25.7* 23.4* 23.9*   MCV 92.4 90.7 89.8   MCH 28.4 28.3 28.2   MCHC 30.7* 31.2* 31.4*   RDW 42.5 41.6 40.6   PLATELETCT 99* 105* 112*   MPV 12.4 11.6 12.1       Recent Labs     04/04/23 0107 04/05/23 0416 04/06/23  0030   SODIUM 136 136 141   POTASSIUM 4.8 4.3 4.5   CHLORIDE 107 104 108   CO2 20 21 22   GLUCOSE 124* 93 91   BUN 27* 26* 25*   CREATININE 1.34 1.31 1.53*   CALCIUM 9.0 8.8 8.9                     Imaging  EC-ECHOCARDIOGRAM COMPLETE W/O CONT   Final Result      CT-CHEST  (THORAX) W/O   Final Result      1.  Multifocal pneumonia.   2.  Trace right pleural effusion.   3.  Megaly.   4.  Atherosclerosis      Fleischner Society pulmonary nodule recommendations:   Not Applicable         US-EXTREMITY VENOUS LOWER BILAT   Final Result      DX-CHEST-PORTABLE (1 VIEW)   Final Result      1.  Decreased pulmonary opacities, probably improved pulmonary edema   2.  Persistently enlarged cardiac silhouette      DX-CHEST-PORTABLE (1 VIEW)   Final Result      No significant interval change.      DX-CHEST-LIMITED (1 VIEW)   Final Result         Airspace opacities throughout the right lung, similar to prior.      DX-CHEST-PORTABLE (1 VIEW)   Final Result      1.  Moderate cardiomegaly.   2.  Central perivascular and interstitial pulmonary edema asymmetrically greater on the right with patchy airspace opacities. Superimposed pneumonia is not excluded.             Assessment/Plan  * Pneumonia due to infectious organism- (present on admission)  Assessment & Plan    Cultures neg  Currently on Cefepime plan to cont through 4/8 per ID recommendations  COVID-19/influenza/RSV negative.    MRSA negative, additional respiratory viral panel negative  The patient with positive BK virus titers, should typically not affect her lung function  CT positive for multifocal pneumonia  ID following  O2 requirement coming down    Anemia due to stage 3b chronic kidney disease (HCC)  Assessment & Plan  Significant drop since admission despite diuretics, recheck, rule out GI bleed  Hgb mid 7s  Pt is guaiac + though microscopic; no melena or BRBPR.  Pt has been having some hemoptysis and epistaxis however  Has had colonsocopies in the past which were reportedly unremarkable though pt can't tell me when the last one was    4/6  Hgb stable  Suspect guaiac + was related to hemoptysis and epistaxis  re-challenge with apixaban  Follow CBC    Permanent atrial fibrillation (HCC)- (present on admission)  Assessment & Plan  Apparently  recent development, has a monitor on currently, has been followed by cardiology  Beta-blockade for rate control  Resume anticoagulation with apixaban      CKD (chronic kidney disease), stage III (HCC)  Assessment & Plan  CKD stage III of transplanted kidney  Nephrology following  Continue immunosuppression with caution  The patient apparently with BK virus infection/reactivation  Nephrology managing conjunction with transplant team  Daily BMP  Creat improving    4/5  Creat has stabilized around 1.3  Cont to monitor  Renally dose medications as appropriate    Hypertensive urgency  Assessment & Plan  As outpt is on metoprolol SR 25, losartan 100, doxazosin 1, amlodipine 10  In house is on  -minoxidil 2.5  -metoprolol SR 25  -losartan 100  -amlodipine 10  .  On above regimen pressures have been in target overnight without PRN coverage    Kidney transplant recipient  Assessment & Plan  The patient appears to be at baseline with her creatinine  Continue prednisone, tacrolimus, CellCept  Avoid nephrotoxins  Bicarb for acidosis    Acute respiratory failure with hypoxia due to volume overload, possible pneumonia (HCC)  Assessment & Plan    Etiology includes pulmonary edema and community-acquired pneumonia.  Patient is on apixaban, therefore PE seems to be less likely.    Treatment for presumed possible heart failure, volume overload as well as pneumonia  Significantly hypertensive initially, adjusting medication regimen, volume control  Continue treatment for multifocal  pneumonia  Monitor input and output and daily weight.       Chest pain- (present on admission)  Assessment & Plan  Probably angina secondary to uncontrolled blood pressure.  Patient denies worsening of chest pain on deep inspiration or with cough.  EKG has not changed, troponin is in indeterminate range 27.  Patient is on anticoagulation with apixaban.  Continue current volume control, blood pressure control  Monitor on telemetry, repeat troponin  TTE with  preserved LVEF, no significant valvular or structural anomalies and no wall motion abnormalities        Acquired hypothyroidism- (present on admission)  Assessment & Plan  Continue levothyroxine    Chronic heart failure with preserved ejection fraction (HCC)- (present on admission)  Assessment & Plan  History of ejection fraction 55%, follow-up on cardiac function by echocardiogram  Maintain euvolemia  Blood pressure control  On BB, ARB  Good candidate for an SGLT2i as outpt    Diabetes (HCC)  Assessment & Plan  Type 2 diabetes   A1c 6.0  As outpt is on lantus 5 and SSI         VTE prophylaxis: therapeutic anticoagulation with apixaban    I have performed a physical exam and reviewed and updated ROS and Plan today (4/6/2023). In review of yesterday's note (4/5/2023), there are no changes except as documented above.

## 2023-04-06 NOTE — PROGRESS NOTES
Nephrology Daily Progress Note    Date of Service  4/6/2023    Chief Complaint  72 y.o. female with ESRD, s/p [place]RT in Oct 2022 in [facility] [place], admitted 3/31/2023 with PNA    Interval Problem Update  4/2 -doing better  No acute events  No new complaints  SOB/cough better  Creat slightly worse from baseline - holding lasix  Leukopenia better  -restarted CellCept  4/3 patient is Sinhala-speaking lady, communication was through   Still short of breath, no chest pain.  4/4 patient is doing better today, shortness of breath improved  Oxygen requirement is improving  4/5 patient has no new complaints  Off oxygen supplement  4/6 patient has no chest pain, no shortness of breath  Review of Systems  Review of Systems   Constitutional:  Negative for chills, fever and malaise/fatigue.   Respiratory:  Negative for cough and shortness of breath.    Cardiovascular:  Negative for chest pain and leg swelling.   Gastrointestinal:  Negative for nausea and vomiting.   Genitourinary:  Negative for dysuria, frequency and urgency.      Physical Exam  Temp:  [36.1 °C (97 °F)-37 °C (98.6 °F)] 37 °C (98.6 °F)  Pulse:  [52-72] 72  Resp:  [14-20] 18  BP: (134-163)/(61-70) 158/66  SpO2:  [91 %-99 %] 94 %    Physical Exam  Vitals and nursing note reviewed.   Constitutional:       General: She is not in acute distress.     Appearance: Normal appearance. She is well-developed. She is ill-appearing. She is not diaphoretic.   HENT:      Head: Normocephalic and atraumatic.      Right Ear: External ear normal.      Left Ear: External ear normal.      Nose: Nose normal.   Eyes:      General: No scleral icterus.        Right eye: No discharge.         Left eye: No discharge.      Conjunctiva/sclera: Conjunctivae normal.   Cardiovascular:      Rate and Rhythm: Normal rate and regular rhythm.      Heart sounds: No murmur heard.  Pulmonary:      Effort: Pulmonary effort is normal. No respiratory distress.      Breath sounds: Normal breath sounds.    Musculoskeletal:         General: No tenderness.      Right lower leg: No edema.      Left lower leg: No edema.   Skin:     General: Skin is warm and dry.      Findings: No erythema.   Neurological:      General: No focal deficit present.      Mental Status: She is alert and oriented to person, place, and time.      Cranial Nerves: No cranial nerve deficit.   Psychiatric:         Mood and Affect: Mood normal.         Behavior: Behavior normal.       Fluids    Intake/Output Summary (Last 24 hours) at 4/6/2023 1253  Last data filed at 4/5/2023 2000  Gross per 24 hour   Intake 300 ml   Output --   Net 300 ml         Laboratory  Recent Labs     04/04/23 0107 04/05/23 0416 04/06/23  0030   WBC 4.5* 3.0* 3.1*   RBC 2.78* 2.58* 2.66*   HEMOGLOBIN 7.9* 7.3* 7.5*   HEMATOCRIT 25.7* 23.4* 23.9*   MCV 92.4 90.7 89.8   MCH 28.4 28.3 28.2   MCHC 30.7* 31.2* 31.4*   RDW 42.5 41.6 40.6   PLATELETCT 99* 105* 112*   MPV 12.4 11.6 12.1       Recent Labs     04/04/23 0107 04/05/23  0416 04/06/23  0030   SODIUM 136 136 141   POTASSIUM 4.8 4.3 4.5   CHLORIDE 107 104 108   CO2 20 21 22   GLUCOSE 124* 93 91   BUN 27* 26* 25*   CREATININE 1.34 1.31 1.53*   CALCIUM 9.0 8.8 8.9           No results for input(s): NTPROBNP in the last 72 hours.          Imaging  EC-ECHOCARDIOGRAM COMPLETE W/O CONT   Final Result      CT-CHEST (THORAX) W/O   Final Result      1.  Multifocal pneumonia.   2.  Trace right pleural effusion.   3.  Megaly.   4.  Atherosclerosis      Fleischner Society pulmonary nodule recommendations:   Not Applicable         US-EXTREMITY VENOUS LOWER BILAT   Final Result      DX-CHEST-PORTABLE (1 VIEW)   Final Result      1.  Decreased pulmonary opacities, probably improved pulmonary edema   2.  Persistently enlarged cardiac silhouette      DX-CHEST-PORTABLE (1 VIEW)   Final Result      No significant interval change.      DX-CHEST-LIMITED (1 VIEW)   Final Result         Airspace opacities throughout the right lung, similar to  prior.      DX-CHEST-PORTABLE (1 VIEW)   Final Result      1.  Moderate cardiomegaly.   2.  Central perivascular and interstitial pulmonary edema asymmetrically greater on the right with patchy airspace opacities. Superimposed pneumonia is not excluded.           Assessment/Plan    The patient is a very pleasant 72-year-old female with   renal transplant from  donor, chronic kidney disease stage IIIB, admitted with pneumonia, hypoxia    1.  SASHA on chronic kidney disease, stage IIIB: Creatinine is fluctuating, no uremic symptoms.  2. Renal transplant recipient.  Continue immunosuppression.  3.  Hypertension: Uncontrolled.  4  Anemia possible GI bleed:  5.  Metabolic acidosis.  6.  Pneumonia  7.  BK nephropathy  8: Leukopenia: Possible side effect of CellCept  RECOMMENDATIONS:  no acute need for HD  Renal diet  Daily BMP, CBC while hospitalized.  Renal dose all meds  Avoid nephrotoxins like NSAIDs.  Plan discussed in detail with Dr. Ewing

## 2023-04-06 NOTE — CARE PLAN
The patient is Stable - Low risk of patient condition declining or worsening    Shift Goals  Clinical Goals: stable H&H  Patient Goals: rest  Family Goals: megan    Progress made toward(s) clinical / shift goals:  H&H stable, no s/s of bleeding,eliquis restarted      Problem: Care Map:  Admission Optimal Outcome for the Heart Failure Patient  Goal: Admission:  Optimal Care of the heart failure patient  Outcome: Progressing     Problem: Care Map:  Optimal Outcome for the Pneumonia Patient  Goal: Collection and monitoring of appropriate tests and labs  Outcome: Progressing     Problem: Respiratory  Goal: Patient will achieve/maintain optimum respiratory ventilation and gas exchange  Outcome: Progressing     Problem: Self Care  Goal: Patient will have the ability to perform ADLs independently or with assistance (bathe, groom, dress, toilet and feed)  Outcome: Met     Problem: Pain - Standard  Goal: Alleviation of pain or a reduction in pain to the patient’s comfort goal  Outcome: Progressing     Problem: Skin Integrity  Goal: Skin integrity is maintained or improved  Outcome: Progressing     Problem: Fall Risk  Goal: Patient will remain free from falls  Outcome: Progressing

## 2023-04-06 NOTE — CARE PLAN
The patient is Stable - Low risk of patient condition declining or worsening    Shift Goals  Clinical Goals: stable H&H, comfort  Patient Goals: rest  Family Goals: megan    Progress made toward(s) clinical / shift goals:    Problem: Care Map:  Optimal Outcome for the Pneumonia Patient  Goal: Collection and monitoring of appropriate tests and labs  Outcome: Progressing     Problem: Respiratory  Goal: Patient will achieve/maintain optimum respiratory ventilation and gas exchange  Outcome: Progressing     Problem: Hemodynamics - Pneumonia  Goal: Patient's hemodynamics, fluid balance and neurologic status will be stable or improve  Outcome: Progressing     Problem: Knowledge Deficit - Standard  Goal: Patient and family/care givers will demonstrate understanding of plan of care, disease process/condition, diagnostic tests and medications  Outcome: Progressing     Problem: Fall Risk  Goal: Patient will remain free from falls  Outcome: Progressing       Patient is not progressing towards the following goals:

## 2023-04-06 NOTE — DISCHARGE PLANNING
Care Transition Team Assessment    RNCM completed assessment with information obtained in chart review. Patient does not have ACP docs on file. Patient lives with spouse and children. Patients son manages her medications. Patient recently underwent kidney transplant in Oct 2022. Patient is currently on IV abx until 4/8 and is unlikely to have any DC needs other than oxygen. Patient will need walking O2 prior to DC. HCM will continue to follow for pending oxygen orders.     Information Source  Orientation Level: Oriented X4  Information Given By:  (EMR)  Who is responsible for making decisions for patient? : Patient    Readmission Evaluation  Is this a readmission?: No    Elopement Risk  Legal Hold: No  Ambulatory or Self Mobile in Wheelchair: Yes  Disoriented: No  Psychiatric Symptoms: None  History of Wandering: No  Elopement this Admit: No  Vocalizing Wanting to Leave: No  Displays Behaviors, Body Language Wanting to Leave: No-Not at Risk for Elopement  Elopement Risk: Not at Risk for Elopement    Interdisciplinary Discharge Planning  Lives with - Patient's Self Care Capacity: Unable To Determine At This Time  Patient or legal guardian wants to designate a caregiver: No  Support Systems: Children  Housing / Facility: Unable To Determine At This Time  Durable Medical Equipment: Not Applicable    Discharge Preparedness  What is your plan after discharge?: Home with help  What are your discharge supports?: Child, Spouse  Prior Functional Level: Ambulatory, Independent with Activities of Daily Living, Needs Assist with Medication Management    Functional Assesment  Prior Functional Level: Ambulatory, Independent with Activities of Daily Living, Needs Assist with Medication Management    Finances  Financial Barriers to Discharge: No  Prescription Coverage: Yes    Vision / Hearing Impairment  Left Eye Vision: Impaired         Advance Directive  Advance Directive?: None    Domestic Abuse  Have you ever been the victim of  abuse or violence?: No  Physical Abuse or Sexual Abuse: No  Verbal Abuse or Emotional Abuse: No  Possible Abuse/Neglect Reported to:: Not Applicable    Psychological Assessment  History of Substance Abuse: None  History of Psychiatric Problems: No  Non-compliant with Treatment: No  Newly Diagnosed Illness: No    Discharge Risks or Barriers  Discharge risks or barriers?: Complex medical needs  Patient risk factors: Complex medical needs, Language barrier, Vulnerable adult    Anticipated Discharge Information  Discharge Disposition: Discharged to home/self care (01)

## 2023-04-07 LAB
GLUCOSE BLD STRIP.AUTO-MCNC: 134 MG/DL (ref 65–99)
GLUCOSE BLD STRIP.AUTO-MCNC: 140 MG/DL (ref 65–99)
GLUCOSE BLD STRIP.AUTO-MCNC: 179 MG/DL (ref 65–99)
GLUCOSE BLD STRIP.AUTO-MCNC: 95 MG/DL (ref 65–99)

## 2023-04-07 PROCEDURE — A9270 NON-COVERED ITEM OR SERVICE: HCPCS | Performed by: INTERNAL MEDICINE

## 2023-04-07 PROCEDURE — 700102 HCHG RX REV CODE 250 W/ 637 OVERRIDE(OP): Performed by: HOSPITALIST

## 2023-04-07 PROCEDURE — 99232 SBSQ HOSP IP/OBS MODERATE 35: CPT | Performed by: INTERNAL MEDICINE

## 2023-04-07 PROCEDURE — 700111 HCHG RX REV CODE 636 W/ 250 OVERRIDE (IP): Performed by: INTERNAL MEDICINE

## 2023-04-07 PROCEDURE — 82962 GLUCOSE BLOOD TEST: CPT | Mod: 91

## 2023-04-07 PROCEDURE — 99232 SBSQ HOSP IP/OBS MODERATE 35: CPT | Performed by: HOSPITALIST

## 2023-04-07 PROCEDURE — A9270 NON-COVERED ITEM OR SERVICE: HCPCS | Performed by: HOSPITALIST

## 2023-04-07 PROCEDURE — 700102 HCHG RX REV CODE 250 W/ 637 OVERRIDE(OP): Performed by: INTERNAL MEDICINE

## 2023-04-07 PROCEDURE — 770001 HCHG ROOM/CARE - MED/SURG/GYN PRIV*

## 2023-04-07 RX ADMIN — ATORVASTATIN CALCIUM 20 MG: 20 TABLET, FILM COATED ORAL at 20:38

## 2023-04-07 RX ADMIN — SODIUM BICARBONATE 1300 MG: 650 TABLET ORAL at 20:38

## 2023-04-07 RX ADMIN — PREDNISONE 5 MG: 10 TABLET ORAL at 05:39

## 2023-04-07 RX ADMIN — Medication 100 MG: at 05:39

## 2023-04-07 RX ADMIN — APIXABAN 2.5 MG: 2.5 TABLET, FILM COATED ORAL at 17:09

## 2023-04-07 RX ADMIN — OMEPRAZOLE 20 MG: 20 CAPSULE, DELAYED RELEASE ORAL at 05:39

## 2023-04-07 RX ADMIN — TACROLIMUS 2 MG: 1 CAPSULE ORAL at 17:09

## 2023-04-07 RX ADMIN — LOSARTAN POTASSIUM 100 MG: 50 TABLET, FILM COATED ORAL at 17:09

## 2023-04-07 RX ADMIN — DOCUSATE SODIUM 50 MG AND SENNOSIDES 8.6 MG 2 TABLET: 8.6; 5 TABLET, FILM COATED ORAL at 17:09

## 2023-04-07 RX ADMIN — MINOXIDIL 2.5 MG: 2.5 TABLET ORAL at 05:40

## 2023-04-07 RX ADMIN — TACROLIMUS 1 MG: 1 CAPSULE ORAL at 05:39

## 2023-04-07 RX ADMIN — SODIUM BICARBONATE 1300 MG: 650 TABLET ORAL at 15:27

## 2023-04-07 RX ADMIN — SODIUM BICARBONATE 1300 MG: 650 TABLET ORAL at 09:28

## 2023-04-07 RX ADMIN — INSULIN HUMAN 1 UNITS: 100 INJECTION, SOLUTION PARENTERAL at 11:30

## 2023-04-07 RX ADMIN — MYCOPHENOLATE MOFETIL 500 MG: 250 CAPSULE ORAL at 05:40

## 2023-04-07 RX ADMIN — LEVOTHYROXINE SODIUM 75 MCG: 0.07 TABLET ORAL at 05:39

## 2023-04-07 RX ADMIN — DOCUSATE SODIUM 50 MG AND SENNOSIDES 8.6 MG 2 TABLET: 8.6; 5 TABLET, FILM COATED ORAL at 05:40

## 2023-04-07 RX ADMIN — Medication 5 MG: at 20:38

## 2023-04-07 RX ADMIN — AMLODIPINE BESYLATE 10 MG: 10 TABLET ORAL at 05:40

## 2023-04-07 RX ADMIN — APIXABAN 2.5 MG: 2.5 TABLET, FILM COATED ORAL at 05:39

## 2023-04-07 ASSESSMENT — ENCOUNTER SYMPTOMS
PALPITATIONS: 0
COUGH: 0
DIARRHEA: 0
NAUSEA: 0
WEAKNESS: 1
HEADACHES: 0
SPUTUM PRODUCTION: 0
FEVER: 0
DIZZINESS: 0
CHILLS: 0
VOMITING: 0
ABDOMINAL PAIN: 0
BACK PAIN: 0
SHORTNESS OF BREATH: 0
LOSS OF CONSCIOUSNESS: 0

## 2023-04-07 ASSESSMENT — PAIN DESCRIPTION - PAIN TYPE
TYPE: ACUTE PAIN

## 2023-04-07 ASSESSMENT — FIBROSIS 4 INDEX: FIB4 SCORE: 2.62

## 2023-04-07 NOTE — PROGRESS NOTES
Nephrology Daily Progress Note    Date of Service  4/7/2023    Chief Complaint  72 y.o. female with ESRD, [place] in Oct [place]22 [place] [facility] [place], admitted 3/31/2023 with PNA    Interval Problem Update  4/2 -doing better  No acute events  No new complaints  SOB/cough better  Creat slightly worse from baseline - holding lasix  Leukopenia better  -restarted CellCept  4/3 patient is Bengali-speaking lady, communication was through   Still short of breath, no chest pain.  4/4 patient is doing better today, shortness of breath improved  Oxygen requirement is improving  4/5 patient has no new complaints  Off oxygen supplement  4/6 patient has no chest pain, no shortness of breath  4/7 patient continues to improve, last antibiotic dose will be tomorrow  Oxygen requirement is being tapered off  Review of Systems  Review of Systems   Constitutional:  Negative for chills, fever and malaise/fatigue.   Respiratory:  Negative for cough and shortness of breath.    Cardiovascular:  Negative for chest pain and leg swelling.   Gastrointestinal:  Negative for nausea and vomiting.   Genitourinary:  Negative for dysuria, frequency and urgency.      Physical Exam  Temp:  [36.3 °C (97.4 °F)-36.8 °C (98.2 °F)] 36.4 °C (97.5 °F)  Pulse:  [54-72] 65  Resp:  [14-18] 14  BP: (130-168)/(59-74) 130/59  SpO2:  [92 %-99 %] 92 %    Physical Exam  Vitals and nursing note reviewed.   Constitutional:       General: She is not in acute distress.     Appearance: Normal appearance. She is well-developed. She is not diaphoretic.   HENT:      Head: Normocephalic and atraumatic.      Right Ear: External ear normal.      Left Ear: External ear normal.      Nose: Nose normal.   Eyes:      General: No scleral icterus.        Right eye: No discharge.         Left eye: No discharge.      Conjunctiva/sclera: Conjunctivae normal.   Cardiovascular:      Rate and Rhythm: Normal rate and regular rhythm.      Heart sounds: No murmur heard.  Pulmonary:      Effort:  Pulmonary effort is normal. No respiratory distress.      Breath sounds: Normal breath sounds.   Musculoskeletal:         General: No tenderness.      Right lower leg: No edema.      Left lower leg: No edema.   Skin:     General: Skin is warm and dry.      Findings: No erythema.   Neurological:      General: No focal deficit present.      Mental Status: She is alert and oriented to person, place, and time.      Cranial Nerves: No cranial nerve deficit.   Psychiatric:         Mood and Affect: Mood normal.         Behavior: Behavior normal.       Fluids  No intake or output data in the 24 hours ending 04/07/23 1317      Laboratory  Recent Labs     04/05/23  0416 04/06/23  0030   WBC 3.0* 3.1*   RBC 2.58* 2.66*   HEMOGLOBIN 7.3* 7.5*   HEMATOCRIT 23.4* 23.9*   MCV 90.7 89.8   MCH 28.3 28.2   MCHC 31.2* 31.4*   RDW 41.6 40.6   PLATELETCT 105* 112*   MPV 11.6 12.1       Recent Labs     04/05/23 0416 04/06/23  0030   SODIUM 136 141   POTASSIUM 4.3 4.5   CHLORIDE 104 108   CO2 21 22   GLUCOSE 93 91   BUN 26* 25*   CREATININE 1.31 1.53*   CALCIUM 8.8 8.9           No results for input(s): NTPROBNP in the last 72 hours.          Imaging  EC-ECHOCARDIOGRAM COMPLETE W/O CONT   Final Result      CT-CHEST (THORAX) W/O   Final Result      1.  Multifocal pneumonia.   2.  Trace right pleural effusion.   3.  Megaly.   4.  Atherosclerosis      Fleischner Society pulmonary nodule recommendations:   Not Applicable         US-EXTREMITY VENOUS LOWER BILAT   Final Result      DX-CHEST-PORTABLE (1 VIEW)   Final Result      1.  Decreased pulmonary opacities, probably improved pulmonary edema   2.  Persistently enlarged cardiac silhouette      DX-CHEST-PORTABLE (1 VIEW)   Final Result      No significant interval change.      DX-CHEST-LIMITED (1 VIEW)   Final Result         Airspace opacities throughout the right lung, similar to prior.      DX-CHEST-PORTABLE (1 VIEW)   Final Result      1.  Moderate cardiomegaly.   2.  Central perivascular  and interstitial pulmonary edema asymmetrically greater on the right with patchy airspace opacities. Superimposed pneumonia is not excluded.           Assessment/Plan    The patient is a very pleasant 72-year-old female with   renal transplant from  donor, chronic kidney disease stage IIIB, admitted with pneumonia, hypoxia    1.  SASHA on chronic kidney disease, stage IIIB: No labs from today , creatinine yesterday was 1.53   2. Renal transplant recipient.  Continue immunosuppression.  3.  Hypertension: Uncontrolled.  4  Anemia possible GI bleed:  5.  Metabolic acidosis.  6.  Pneumonia  7.  BK nephropathy  8: Leukopenia: Possible side effect of CellCept  RECOMMENDATIONS:  no acute need for HD  Renal diet  Daily BMP, CBC while hospitalized.  Renal dose all meds  Avoid nephrotoxins like NSAIDs.

## 2023-04-07 NOTE — PROGRESS NOTES
Hospital Medicine Daily Progress Note    Date of Service  4/7/2023    Chief Complaint  Tere Gallegos is a 72 y.o. female admitted 3/31/2023 with SOB    Hospital Course  73yo PMHx renal transplant Oct 2022, CKD III, CAD with hxof NOEMI to RCA, PAFib on apixaban, HTN, hypothyroidism, HFpEF, DM, HTN, FLS presenting with SOB.  Admitted with Dx of pneumonia.  COVID/RSV/Influenza neg however BK titer positive    Interval Problem Update  No complaints  No cough  Still some SOB with ambulation but lessening as days go by    AFebrile  Sinus 60-70s  SBP 130s  On 1 LNC    I have discussed this patient's plan of care and discharge plan at IDT rounds today with Case Management, Nursing, Nursing leadership, and other members of the IDT team.    Consultants/Specialty  infectious disease and nephrology    Code Status  Full Code    Disposition  Patient is not medically cleared for discharge.   Anticipate discharge to to home with close outpatient follow-up.  I have placed the appropriate orders for post-discharge needs.    Review of Systems  Review of Systems   Constitutional:  Negative for chills and fever.   Respiratory:  Negative for cough, sputum production and shortness of breath.    Cardiovascular:  Negative for chest pain, palpitations and leg swelling.   Gastrointestinal:  Negative for abdominal pain, diarrhea, nausea and vomiting.   Genitourinary:  Negative for dysuria and urgency.   Musculoskeletal:  Negative for back pain.   Neurological:  Positive for weakness. Negative for dizziness, loss of consciousness and headaches.      Physical Exam  Temp:  [36.2 °C (97.1 °F)-37 °C (98.6 °F)] 36.8 °C (98.2 °F)  Pulse:  [54-72] 57  Resp:  [14-18] 14  BP: (130-168)/(61-74) 146/65  SpO2:  [93 %-99 %] 95 %    Physical Exam  Constitutional:       General: She is not in acute distress.     Appearance: Normal appearance. She is well-developed. She is not diaphoretic.   HENT:      Head: Normocephalic and atraumatic.   Eyes:       General: No scleral icterus.     Conjunctiva/sclera: Conjunctivae normal.   Neck:      Vascular: No JVD.   Cardiovascular:      Rate and Rhythm: Normal rate and regular rhythm.      Heart sounds: Murmur heard.   Pulmonary:      Effort: Pulmonary effort is normal. No respiratory distress.      Breath sounds: No stridor. No wheezing, rhonchi or rales.   Abdominal:      Palpations: Abdomen is soft.      Tenderness: There is no abdominal tenderness. There is no guarding or rebound.   Musculoskeletal:         General: No tenderness.      Right lower leg: No edema.      Left lower leg: No edema.   Skin:     General: Skin is warm and dry.      Coloration: Skin is not jaundiced.      Findings: No rash.   Neurological:      General: No focal deficit present.      Mental Status: She is alert and oriented to person, place, and time. Mental status is at baseline.   Psychiatric:         Mood and Affect: Mood normal.         Behavior: Behavior normal.         Thought Content: Thought content normal.         Judgment: Judgment normal.       Fluids  No intake or output data in the 24 hours ending 04/07/23 0708      Laboratory  Recent Labs     04/05/23  0416 04/06/23  0030   WBC 3.0* 3.1*   RBC 2.58* 2.66*   HEMOGLOBIN 7.3* 7.5*   HEMATOCRIT 23.4* 23.9*   MCV 90.7 89.8   MCH 28.3 28.2   MCHC 31.2* 31.4*   RDW 41.6 40.6   PLATELETCT 105* 112*   MPV 11.6 12.1       Recent Labs     04/05/23  0416 04/06/23  0030   SODIUM 136 141   POTASSIUM 4.3 4.5   CHLORIDE 104 108   CO2 21 22   GLUCOSE 93 91   BUN 26* 25*   CREATININE 1.31 1.53*   CALCIUM 8.8 8.9                     Imaging  EC-ECHOCARDIOGRAM COMPLETE W/O CONT   Final Result      CT-CHEST (THORAX) W/O   Final Result      1.  Multifocal pneumonia.   2.  Trace right pleural effusion.   3.  Megaly.   4.  Atherosclerosis      Fleischner Society pulmonary nodule recommendations:   Not Applicable         US-EXTREMITY VENOUS LOWER BILAT   Final Result      DX-CHEST-PORTABLE (1 VIEW)   Final  Result      1.  Decreased pulmonary opacities, probably improved pulmonary edema   2.  Persistently enlarged cardiac silhouette      DX-CHEST-PORTABLE (1 VIEW)   Final Result      No significant interval change.      DX-CHEST-LIMITED (1 VIEW)   Final Result         Airspace opacities throughout the right lung, similar to prior.      DX-CHEST-PORTABLE (1 VIEW)   Final Result      1.  Moderate cardiomegaly.   2.  Central perivascular and interstitial pulmonary edema asymmetrically greater on the right with patchy airspace opacities. Superimposed pneumonia is not excluded.             Assessment/Plan  * Pneumonia due to infectious organism- (present on admission)  Assessment & Plan    Cultures neg  Currently on Cefepime plan to cont through 4/8 per ID recommendations  COVID-19/influenza/RSV negative.    MRSA negative, additional respiratory viral panel negative  The patient with positive BK virus titers, should typically not affect her lung function  CT positive for multifocal pneumonia  ID following  O2 requirement coming down    Will complete Cefepime tomorrow and plan to dc home if she cont's to do well overnight    Anemia due to stage 3b chronic kidney disease (HCC)  Assessment & Plan  Significant drop since admission despite diuretics, recheck, rule out GI bleed  Hgb mid 7s  Pt is guaiac + though microscopic; no melena or BRBPR.  Pt has been having some hemoptysis and epistaxis however  Has had colonsocopies in the past which were reportedly unremarkable though pt can't tell me when the last one was    4/6  Hgb stable  Suspect guaiac + was related to hemoptysis and epistaxis  re-challenge with apixaban  Follow CBC    Permanent atrial fibrillation (HCC)- (present on admission)  Assessment & Plan  Apparently recent development, has a monitor on currently, has been followed by cardiology  Beta-blockade for rate control  Resume anticoagulation with apixaban: dose adjust 2.5mg      CKD (chronic kidney disease), stage  III (HCC)  Assessment & Plan  CKD stage III of transplanted kidney  Nephrology following  Continue immunosuppression with caution  The patient apparently with BK virus infection/reactivation  Nephrology managing conjunction with transplant team  Daily BMP  Creat improving    4/5  Creat has stabilized around 1.3  Cont to monitor  Renally dose medications as appropriate    Hypertensive urgency  Assessment & Plan  As outpt is on metoprolol SR 25, losartan 100, doxazosin 1, amlodipine 10  In house is on  -minoxidil 2.5  -metoprolol SR 25  -losartan 100  -amlodipine 10  .  On above regimen pressures have been in target overnight without PRN coverage    Kidney transplant recipient  Assessment & Plan  The patient appears to be at baseline with her creatinine  Continue prednisone, tacrolimus, CellCept  Avoid nephrotoxins  Bicarb for acidosis    Acute respiratory failure with hypoxia due to volume overload, possible pneumonia (HCC)  Assessment & Plan    Etiology includes pulmonary edema and community-acquired pneumonia.  Patient is on apixaban, therefore PE seems to be less likely.    Treatment for presumed possible heart failure, volume overload as well as pneumonia  Significantly hypertensive initially, adjusting medication regimen, volume control  Continue treatment for multifocal  pneumonia  Monitor input and output and daily weight.     4/7 Hgb stable in mid 7's now back on apixaban      Chest pain- (present on admission)  Assessment & Plan  Probably angina secondary to uncontrolled blood pressure.  Patient denies worsening of chest pain on deep inspiration or with cough.  EKG has not changed, troponin is in indeterminate range 27.  Patient is on anticoagulation with apixaban.  Continue current volume control, blood pressure control  Monitor on telemetry, repeat troponin  TTE with preserved LVEF, no significant valvular or structural anomalies and no wall motion abnormalities        Acquired hypothyroidism- (present on  admission)  Assessment & Plan  Continue levothyroxine    Chronic heart failure with preserved ejection fraction (HCC)- (present on admission)  Assessment & Plan  History of ejection fraction 55%, follow-up on cardiac function by echocardiogram  Maintain euvolemia  Blood pressure control  On BB, ARB  Good candidate for an SGLT2i as outpt    Diabetes (HCC)  Assessment & Plan  Type 2 diabetes   A1c 6.0  As outpt is on lantus 5 and SSI         VTE prophylaxis: therapeutic anticoagulation with apixaban    I have performed a physical exam and reviewed and updated ROS and Plan today (4/7/2023). In review of yesterday's note (4/6/2023), there are no changes except as documented above.

## 2023-04-07 NOTE — FACE TO FACE
"Face to Face Note  -  Durable Medical Equipment    Frederick Ewing D.O. - NPI: [id number]  I certify that this patient is under my care and that they had a durable medical equipment(DME)face to face encounter by myself that meets the physician DME face-to-face encounter requirements with this patient on:    Date of encounter:   Patient:                    MRN:                       YOB: 2024  Tere Gallegos  [id number]  1950     The encounter with the patient was in whole, or in part, for the following medical condition, which is the primary reason for durable medical equipment:  CHF    I certify that, based on my findings, the following durable medical equipment is medically necessary:    Oxygen   HOME O2 Saturation Measurements:(Values must be present for Home Oxygen orders)  Room air sat at rest: 89     With liters of O2: 1, O2 sat at rest with O2: 94  With Liters of O2: 1, O2 sat with amb with O2 : 93  Is the patient mobile?: Yes  If patient feels more short of breath, they can go up to 6 liters per minute and contact healthcare provider.    Supporting Symptoms: The patient requires supplemental oxygen, as the following interventions have been tried with limited or no improvement: \"Bronchodilators and/or steroid inhalers, \"Ambulation with oximetry, and \"Incentive spirometry.    My Clinical findings support the need for the above equipment due to:  Hypoxia  "

## 2023-04-07 NOTE — CARE PLAN
The patient is Stable - Low risk of patient condition declining or worsening    Shift Goals  Clinical Goals: stable H&H. no pain  Patient Goals: sleep  Family Goals: megan    Progress made toward(s) clinical / shift goals:    Problem: Care Map:  Optimal Outcome for the Pneumonia Patient  Goal: Collection and monitoring of appropriate tests and labs  Outcome: Progressing     Problem: Respiratory  Goal: Patient will achieve/maintain optimum respiratory ventilation and gas exchange  Outcome: Progressing     Problem: Hemodynamics - Pneumonia  Goal: Patient's hemodynamics, fluid balance and neurologic status will be stable or improve  Outcome: Progressing     Problem: Discharge Planning - Pneumonia  Goal: Patient will verbalize understanding of lifestyle changes and therapeutic needs post discharge  Outcome: Progressing     Problem: Knowledge Deficit - Standard  Goal: Patient and family/care givers will demonstrate understanding of plan of care, disease process/condition, diagnostic tests and medications  Outcome: Progressing     Problem: Pain - Standard  Goal: Alleviation of pain or a reduction in pain to the patient’s comfort goal  Outcome: Progressing     Problem: Fall Risk  Goal: Patient will remain free from falls  Outcome: Progressing       Patient is not progressing towards the following goals:

## 2023-04-07 NOTE — DISCHARGE PLANNING
Case Management Discharge Planning    Admission Date: 3/31/2023  GMLOS: 4  ALOS: 7    6-Clicks ADL Score: 21  6-Clicks Mobility Score: 20      Anticipated Discharge Dispo: Discharge Disposition: Discharged to home/self care (01)    DME Needed: Yes    DME Ordered: Yes    Action(s) Taken: Referral(s) sent and DME Face to Face     Escalations Completed: None    Medically Clear: No    Next Steps: RNCM faxed DME choice for [facility] to DREW Pinzon RNCM to follow up with [facility] for ETA on delivery.      Barriers to Discharge: Medical clearance and Oxygen Delivery    Is the patient up for discharge tomorrow: Yes        1508 RNCM received call from Savita at [facility]. Patient does not qualify for O2 as RA is 89% and only Dx is pneumonia. Dr. Miller notified.

## 2023-04-07 NOTE — DISCHARGE PLANNING
Received Choice form @: [phone]  Agency/Facility Name: [facility]  Referral sent per Choice form @: [phone]  Agency[facility]Fac[facility] Name: [facility]  Spoke To: Savita  Outcome: Delivery of O2 today to Pt. Bedside, no ETA.    DPA Informed CM

## 2023-04-08 VITALS
RESPIRATION RATE: 14 BRPM | HEIGHT: 63 IN | DIASTOLIC BLOOD PRESSURE: 71 MMHG | TEMPERATURE: 97.7 F | BODY MASS INDEX: 23.44 KG/M2 | WEIGHT: 132.28 LBS | OXYGEN SATURATION: 95 % | SYSTOLIC BLOOD PRESSURE: 161 MMHG | HEART RATE: 76 BPM

## 2023-04-08 PROBLEM — R07.9 CHEST PAIN: Status: RESOLVED | Noted: 2021-06-16 | Resolved: 2023-04-08

## 2023-04-08 PROBLEM — I16.0 HYPERTENSIVE URGENCY: Status: RESOLVED | Noted: 2023-03-31 | Resolved: 2023-04-08

## 2023-04-08 PROBLEM — J96.01 ACUTE RESPIRATORY FAILURE WITH HYPOXIA (HCC): Status: RESOLVED | Noted: 2022-10-19 | Resolved: 2023-04-08

## 2023-04-08 PROBLEM — J18.9 PNEUMONIA DUE TO INFECTIOUS ORGANISM: Status: RESOLVED | Noted: 2023-03-31 | Resolved: 2023-04-08

## 2023-04-08 LAB
ANION GAP SERPL CALC-SCNC: 10 MMOL/L (ref 7–16)
BASOPHILS # BLD AUTO: 0.4 % (ref 0–1.8)
BASOPHILS # BLD: 0.02 K/UL (ref 0–0.12)
BUN SERPL-MCNC: 30 MG/DL (ref 8–22)
CALCIUM SERPL-MCNC: 9.4 MG/DL (ref 8.5–10.5)
CHLORIDE SERPL-SCNC: 106 MMOL/L (ref 96–112)
CO2 SERPL-SCNC: 23 MMOL/L (ref 20–33)
CREAT SERPL-MCNC: 2.15 MG/DL (ref 0.5–1.4)
EOSINOPHIL # BLD AUTO: 0.05 K/UL (ref 0–0.51)
EOSINOPHIL NFR BLD: 1 % (ref 0–6.9)
ERYTHROCYTE [DISTWIDTH] IN BLOOD BY AUTOMATED COUNT: 42.5 FL (ref 35.9–50)
GFR SERPLBLD CREATININE-BSD FMLA CKD-EPI: 24 ML/MIN/1.73 M 2
GLUCOSE BLD STRIP.AUTO-MCNC: 90 MG/DL (ref 65–99)
GLUCOSE SERPL-MCNC: 133 MG/DL (ref 65–99)
HCT VFR BLD AUTO: 26.7 % (ref 37–47)
HGB BLD-MCNC: 8.2 G/DL (ref 12–16)
IMM GRANULOCYTES # BLD AUTO: 0.02 K/UL (ref 0–0.11)
IMM GRANULOCYTES NFR BLD AUTO: 0.4 % (ref 0–0.9)
LYMPHOCYTES # BLD AUTO: 0.27 K/UL (ref 1–4.8)
LYMPHOCYTES NFR BLD: 5.6 % (ref 22–41)
MCH RBC QN AUTO: 28.3 PG (ref 27–33)
MCHC RBC AUTO-ENTMCNC: 30.7 G/DL (ref 33.6–35)
MCV RBC AUTO: 92.1 FL (ref 81.4–97.8)
MONOCYTES # BLD AUTO: 0.35 K/UL (ref 0–0.85)
MONOCYTES NFR BLD AUTO: 7.2 % (ref 0–13.4)
NEUTROPHILS # BLD AUTO: 4.14 K/UL (ref 2–7.15)
NEUTROPHILS NFR BLD: 85.4 % (ref 44–72)
NRBC # BLD AUTO: 0 K/UL
NRBC BLD-RTO: 0 /100 WBC
PLATELET # BLD AUTO: 162 K/UL (ref 164–446)
PMV BLD AUTO: 11.4 FL (ref 9–12.9)
POTASSIUM SERPL-SCNC: 4.1 MMOL/L (ref 3.6–5.5)
RBC # BLD AUTO: 2.9 M/UL (ref 4.2–5.4)
SODIUM SERPL-SCNC: 139 MMOL/L (ref 135–145)
WBC # BLD AUTO: 4.9 K/UL (ref 4.8–10.8)

## 2023-04-08 PROCEDURE — 80048 BASIC METABOLIC PNL TOTAL CA: CPT

## 2023-04-08 PROCEDURE — 700111 HCHG RX REV CODE 636 W/ 250 OVERRIDE (IP): Performed by: INTERNAL MEDICINE

## 2023-04-08 PROCEDURE — A9270 NON-COVERED ITEM OR SERVICE: HCPCS | Performed by: HOSPITALIST

## 2023-04-08 PROCEDURE — 700102 HCHG RX REV CODE 250 W/ 637 OVERRIDE(OP): Performed by: HOSPITALIST

## 2023-04-08 PROCEDURE — 82962 GLUCOSE BLOOD TEST: CPT

## 2023-04-08 PROCEDURE — 700102 HCHG RX REV CODE 250 W/ 637 OVERRIDE(OP): Performed by: INTERNAL MEDICINE

## 2023-04-08 PROCEDURE — 700105 HCHG RX REV CODE 258: Performed by: INTERNAL MEDICINE

## 2023-04-08 PROCEDURE — A9270 NON-COVERED ITEM OR SERVICE: HCPCS | Performed by: INTERNAL MEDICINE

## 2023-04-08 PROCEDURE — 85025 COMPLETE CBC W/AUTO DIFF WBC: CPT

## 2023-04-08 PROCEDURE — 99239 HOSP IP/OBS DSCHRG MGMT >30: CPT | Performed by: HOSPITALIST

## 2023-04-08 RX ORDER — LANOLIN ALCOHOL/MO/W.PET/CERES
100 CREAM (GRAM) TOPICAL DAILY
Qty: 30 TABLET | Refills: 0 | Status: SHIPPED | OUTPATIENT
Start: 2023-04-09 | End: 2023-06-27

## 2023-04-08 RX ORDER — SODIUM BICARBONATE 650 MG/1
1300 TABLET ORAL 3 TIMES DAILY
Qty: 120 TABLET | Refills: 0 | Status: SHIPPED | OUTPATIENT
Start: 2023-04-08 | End: 2023-06-27

## 2023-04-08 RX ORDER — MINOXIDIL 2.5 MG/1
2.5 TABLET ORAL DAILY
Qty: 30 TABLET | Refills: 0 | Status: SHIPPED | OUTPATIENT
Start: 2023-04-09 | End: 2023-06-06

## 2023-04-08 RX ADMIN — LEVOTHYROXINE SODIUM 75 MCG: 0.07 TABLET ORAL at 05:18

## 2023-04-08 RX ADMIN — APIXABAN 2.5 MG: 2.5 TABLET, FILM COATED ORAL at 05:18

## 2023-04-08 RX ADMIN — PREDNISONE 5 MG: 10 TABLET ORAL at 05:18

## 2023-04-08 RX ADMIN — SODIUM BICARBONATE 1300 MG: 650 TABLET ORAL at 08:23

## 2023-04-08 RX ADMIN — DOCUSATE SODIUM 50 MG AND SENNOSIDES 8.6 MG 2 TABLET: 8.6; 5 TABLET, FILM COATED ORAL at 05:18

## 2023-04-08 RX ADMIN — OMEPRAZOLE 20 MG: 20 CAPSULE, DELAYED RELEASE ORAL at 05:18

## 2023-04-08 RX ADMIN — MINOXIDIL 2.5 MG: 2.5 TABLET ORAL at 05:19

## 2023-04-08 RX ADMIN — Medication 100 MG: at 05:18

## 2023-04-08 RX ADMIN — CEFEPIME 1 G: 1 INJECTION, POWDER, FOR SOLUTION INTRAMUSCULAR; INTRAVENOUS at 00:17

## 2023-04-08 RX ADMIN — AMLODIPINE BESYLATE 10 MG: 10 TABLET ORAL at 05:18

## 2023-04-08 RX ADMIN — TACROLIMUS 1 MG: 1 CAPSULE ORAL at 05:19

## 2023-04-08 RX ADMIN — MYCOPHENOLATE MOFETIL 500 MG: 250 CAPSULE ORAL at 05:19

## 2023-04-08 ASSESSMENT — PAIN DESCRIPTION - PAIN TYPE
TYPE: ACUTE PAIN
TYPE: ACUTE PAIN

## 2023-04-08 ASSESSMENT — FIBROSIS 4 INDEX: FIB4 SCORE: 2.62

## 2023-04-08 NOTE — PROGRESS NOTES
IV dc'd.  Discharge instructions given to patient; patient verbalizes understanding, all questions answered.  Copy of DC summary provided, signed copy in chart.  3 prescriptions electronically sent to pt's pharmacy/provided to patient, copies in chart.  Pt states personal belongings are in possession.  Pt escorted off unit by this RN without incident.

## 2023-04-08 NOTE — CARE PLAN
The patient is Stable - Low risk of patient condition declining or worsening    Shift Goals  Clinical Goals: hemodynamic stability  Patient Goals: sleep  Family Goals: megan    Progress made toward(s) clinical / shift goals:    Problem: Care Map:  Day Before Discharge Optimal Outcome for the Heart Failure Patient  Goal: Day Before Discharge:  Optimal Care of the heart failure patient  Outcome: Progressing     Problem: Discharge Planning - Pneumonia  Goal: Patient will verbalize understanding of lifestyle changes and therapeutic needs post discharge  Outcome: Progressing     Problem: Knowledge Deficit - Standard  Goal: Patient and family/care givers will demonstrate understanding of plan of care, disease process/condition, diagnostic tests and medications  Outcome: Progressing     Problem: Fall Risk  Goal: Patient will remain free from falls  Outcome: Progressing       Patient is not progressing towards the following goals:

## 2023-04-09 NOTE — DISCHARGE SUMMARY
Discharge Summary    CHIEF COMPLAINT ON ADMISSION  Chief Complaint   Patient presents with    Chest Pain     With radiation to RUE and associated SOB.  Onset of symptoms at 1500 today.  SOB onset last night.  Pt noted to be shivering, denies feeling cold.       Reason for Admission  SOB      Admission Date  3/31/2023    CODE STATUS  Prior    HPI & HOSPITAL COURSE  This is a 72 y.o. female here with history that includes renal transplantation in October 2022, coronary artery diseasepost drug-eluting stent to the RCA, paroxysmal atrial fibrillation, anticoagulation on apixaban, hypertension, hypothyroidism, HFpEF, type 2 diabetes, hypertension, restless leg syndrome.    Patient presented for evaluation on March 31 with complaint of shortness of breath, and dull pain in her chest.  In the ED she was hypertensive around 190 systolic, 87% on room air requiring 4 L to maintain saturations.  Her COVID/influenza/RSV screens were negative however imaging demonstrated multifocal pneumonia.  EKG was negative for signs of acute ischemia, initial troponin slightly elevated at 26.  Patient was admitted to the hospital with diagnosis of sepsis, acute hypoxic respiratory failure secondary to pneumonia, with consultation from nephrology and infectious disease.    In-house the patient did well clinically.  She improved steadily and came off of supplemental oxygen the day prior to discharge.  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  As noted ID was consulted and recommended course of treatment with cefepime for 7 days given her immunosuppressed state.  Her BK viremia was reviewed with the transplant center by nephrology, and they are going to follow this as outpatient.  Patient has a cardiac echo on April 3, this shows preserved EF of 60%, with right heart pressures of 40 mmHg.  The remainder the study was relatively benign.    Hospital course was complicated by some anemia, which is  chronic on presentation.  She was guaiac positive however she was also having some hemoptysis, and epistaxis around the time of her stool occult.  Her hemoglobin did remain stable in the range of 8-7, she did not require transfusion.  Prior to discharge she was placed back on her apixaban for period of 48 hours, with no change in her hematocrit.    Renal function very during her hospital stay from 1.7-2.1.  She is following up with nephrology on Monday after discharge.  In terms of changes in medications on her discharge, her her mycophenolate dose has been decreased to 500 mg daily, and her tacrolimus dose has been unchanged.  Apixaban dose has also been decreased to 2.5 mg based on criteria for dose adjustment in renal failure.  She has been started on minoxidil, and sodium bicarb.  Her doxazosin has been DC'd.    Patient has follow-up with nephrology on Monday.    On the day of discharge she is ambulating the unit on room air, feeling much improved and eager to go home.  No notes on file    ADDENDUM 4/9/23  A mistake was made on the DC med Rec; she was told to stop her mycophenalate and tacrolimus.  That is not correct.  Doses should be adjusted as noted above.  I called and spoke with the pt and with her son Manoj who lays out her medications for her and clarified that she is to continue these medications.  I apologized for the error.    Therefore, she is discharged in good and stable condition to home with close outpatient follow-up.    The patient met 2-midnight criteria for an inpatient stay at the time of discharge.    Discharge Date  4/8/2023    FOLLOW UP ITEMS POST DISCHARGE  Nephrology and transplant center    DISCHARGE DIAGNOSES  Principal Problem (Resolved):    Pneumonia due to infectious organism POA: Yes  Active Problems:    Diabetes (HCC) POA: Unknown      Overview: Patient is only on pioglitazone for her diabetes. She used to follow with       endocrinology but has not visited since December  2021    Chronic heart failure with preserved ejection fraction (HCC) POA: Yes    Acquired hypothyroidism POA: Yes    CKD (chronic kidney disease), stage III (HCC) POA: Unknown    Permanent atrial fibrillation (HCC) POA: Yes    Anemia due to stage 3b chronic kidney disease (HCC) POA: Unknown  Resolved Problems:    Chest pain POA: Yes    Acute respiratory failure with hypoxia due to volume overload, possible pneumonia (HCC) POA: Unknown    Hypertensive urgency POA: Unknown      FOLLOW UP  Future Appointments   Date Time Provider Department Center   6/7/2023 11:10 AM ANGELITA GuzmánR [facility]inson   6/8/2023  4:00 PM ANGELITA ConcepcionMG [facility]inson   6/27/2023  4:00 PM ANGELITA Fisher [facility] None   8/7/2023  4:45 PM V EXAM 4 [facility] None     ANGELITA Concepcion  [address]  [address]  [facility] [id number]  [phone]    Follow up        MEDICATIONS ON DISCHARGE     Medication List        START taking these medications        Instructions   minoxidil 2.5 MG Tabs  Commonly known as: LONITEN   Take 1 Tablet by mouth every day.  Dose: 2.5 mg     sodium bicarbonate 650 MG Tabs  Commonly known as: SODIUM BICARBONATE   Take 2 Tablets by mouth in the morning, at noon, and at bedtime.  Dose: 1,300 mg     thiamine 100 MG tablet  Commonly known as: THIAMINE   Take 1 Tablet by mouth every day.  Dose: 100 mg            CONTINUE taking these medications        Instructions   amLODIPine 10 MG Tabs  Commonly known as: NORVASC   Take 10 mg by mouth every day.  Dose: 10 mg     atorvastatin 20 MG Tabs  Commonly known as: LIPITOR   Take 1 Tablet by mouth every evening.  Dose: 20 mg     BD Pen Needle Yasmeen 2nd Gen  Generic drug: Insulin Pen Needle 32 G x 4 mm   USE AS DIRECTED WITH INSULIN PENS THREE TIMES DAILY BEFORE A MEAL     Lancets   Doctor's comments: Or per formulary preference. ICD-10 code: E11.9 Controlled type 2 Diabetes Mellitus with long term insulin use  Use one Freestyle Pearl lancet to  test blood sugar once daily .     Lantus SoloStar 100 UNIT/ML Sopn injection  Generic drug: insulin glargine   Inject 5 Units under the skin every day.  Dose: 5 Units     levothyroxine 75 MCG Tabs  Commonly known as: SYNTHROID   Take 1 Tablet by mouth every morning on an empty stomach.  Dose: 75 mcg     losartan 100 MG Tabs  Commonly known as: COZAAR   Take 1 Tablet by mouth every evening.  Dose: 100 mg     metoprolol SR 25 MG Tb24  Commonly known as: TOPROL XL   Take 25 mg by mouth every day.  Dose: 25 mg     NovoLOG FlexPen 100 UNIT/ML injection PEN  Generic drug: insulin aspart   Inject 2-8 Units under the skin 3 times a day before meals. Sliding Scale  Dose: 2-8 Units     ONE TOUCH ULTRA 2 w/Device Kit   1 DEVICE 3 TIMES A DAY BEFORE MEALS.     OneTouch Verio strip  Generic drug: glucose blood   1 Strip by Other route as needed (on insulin checking 3-4 times a day).  Dose: 1 Each     predniSONE 5 MG Tabs  Commonly known as: DELTASONE   Take 5 mg by mouth every day.  Dose: 5 mg            STOP taking these medications      apixaban 5 MG Tabs tablet  Commonly known as: ELIQUIS     doxazosin 1 MG Tabs  Commonly known as: CARDURA     mycophenolate 500 MG tablet  Commonly known as: CELLCEPT     tacrolimus 1 MG Caps  Commonly known as: PROGRAF              Allergies  No Known Allergies    DIET  No orders of the defined types were placed in this encounter.      ACTIVITY  As tolerated.  Weight bearing as tolerated    CONSULTATIONS  Nephrology   ID    PROCEDURES  none    LABORATORY  Lab Results   Component Value Date    SODIUM 139 04/08/2023    POTASSIUM 4.1 04/08/2023    CHLORIDE 106 04/08/2023    CO2 23 04/08/2023    GLUCOSE 133 (H) 04/08/2023    BUN 30 (H) 04/08/2023    CREATININE 2.15 (H) 04/08/2023        Lab Results   Component Value Date    WBC 4.9 04/08/2023    HEMOGLOBIN 8.2 (L) 04/08/2023    HEMATOCRIT 26.7 (L) 04/08/2023    PLATELETCT 162 (L) 04/08/2023        Total time of the discharge process exceeds 38  minutes.  Most of that time spent with the patient reviewing discharge instructions and follow up

## 2023-04-10 ENCOUNTER — HOSPITAL ENCOUNTER (OUTPATIENT)
Dept: LAB | Facility: MEDICAL CENTER | Age: 73
End: 2023-04-10
Attending: INTERNAL MEDICINE
Payer: MEDICARE

## 2023-04-10 ENCOUNTER — PATIENT OUTREACH (OUTPATIENT)
Dept: MEDICAL GROUP | Facility: MEDICAL CENTER | Age: 73
End: 2023-04-10
Payer: MEDICARE

## 2023-04-10 LAB
ALBUMIN SERPL BCP-MCNC: 3.8 G/DL (ref 3.2–4.9)
ALBUMIN/GLOB SERPL: 1.5 G/DL
ALP SERPL-CCNC: 78 U/L (ref 30–99)
ALT SERPL-CCNC: 37 U/L (ref 2–50)
ANION GAP SERPL CALC-SCNC: 9 MMOL/L (ref 7–16)
APPEARANCE UR: CLEAR
AST SERPL-CCNC: 39 U/L (ref 12–45)
BASOPHILS # BLD AUTO: 0.2 % (ref 0–1.8)
BASOPHILS # BLD: 0.01 K/UL (ref 0–0.12)
BILIRUB SERPL-MCNC: 0.4 MG/DL (ref 0.1–1.5)
BILIRUB UR QL STRIP.AUTO: NEGATIVE
BUN SERPL-MCNC: 33 MG/DL (ref 8–22)
CALCIUM ALBUM COR SERPL-MCNC: 9.9 MG/DL (ref 8.5–10.5)
CALCIUM SERPL-MCNC: 9.7 MG/DL (ref 8.4–10.2)
CHLORIDE SERPL-SCNC: 105 MMOL/L (ref 96–112)
CO2 SERPL-SCNC: 24 MMOL/L (ref 20–33)
COLOR UR: YELLOW
CREAT SERPL-MCNC: 2.35 MG/DL (ref 0.5–1.4)
EOSINOPHIL # BLD AUTO: 0.04 K/UL (ref 0–0.51)
EOSINOPHIL NFR BLD: 0.8 % (ref 0–6.9)
ERYTHROCYTE [DISTWIDTH] IN BLOOD BY AUTOMATED COUNT: 42.3 FL (ref 35.9–50)
GFR SERPLBLD CREATININE-BSD FMLA CKD-EPI: 21 ML/MIN/1.73 M 2
GLOBULIN SER CALC-MCNC: 2.6 G/DL (ref 1.9–3.5)
GLUCOSE SERPL-MCNC: 102 MG/DL (ref 65–99)
GLUCOSE UR STRIP.AUTO-MCNC: NEGATIVE MG/DL
HCT VFR BLD AUTO: 25.4 % (ref 37–47)
HGB BLD-MCNC: 8 G/DL (ref 12–16)
IMM GRANULOCYTES # BLD AUTO: 0.03 K/UL (ref 0–0.11)
IMM GRANULOCYTES NFR BLD AUTO: 0.6 % (ref 0–0.9)
KETONES UR STRIP.AUTO-MCNC: NEGATIVE MG/DL
LEUKOCYTE ESTERASE UR QL STRIP.AUTO: NEGATIVE
LYMPHOCYTES # BLD AUTO: 0.36 K/UL (ref 1–4.8)
LYMPHOCYTES NFR BLD: 7.4 % (ref 22–41)
MCH RBC QN AUTO: 28.4 PG (ref 27–33)
MCHC RBC AUTO-ENTMCNC: 31.5 G/DL (ref 33.6–35)
MCV RBC AUTO: 90.1 FL (ref 81.4–97.8)
MICRO URNS: NORMAL
MONOCYTES # BLD AUTO: 0.35 K/UL (ref 0–0.85)
MONOCYTES NFR BLD AUTO: 7.2 % (ref 0–13.4)
NEUTROPHILS # BLD AUTO: 4.06 K/UL (ref 2–7.15)
NEUTROPHILS NFR BLD: 83.8 % (ref 44–72)
NITRITE UR QL STRIP.AUTO: NEGATIVE
NRBC # BLD AUTO: 0 K/UL
NRBC BLD-RTO: 0 /100 WBC
PH UR STRIP.AUTO: 7 [PH] (ref 5–8)
PLATELET # BLD AUTO: 173 K/UL (ref 164–446)
PMV BLD AUTO: 11.1 FL (ref 9–12.9)
POTASSIUM SERPL-SCNC: 5 MMOL/L (ref 3.6–5.5)
PROT SERPL-MCNC: 6.4 G/DL (ref 6–8.2)
PROT UR QL STRIP: NEGATIVE MG/DL
RBC # BLD AUTO: 2.82 M/UL (ref 4.2–5.4)
RBC UR QL AUTO: NEGATIVE
SODIUM SERPL-SCNC: 138 MMOL/L (ref 135–145)
SP GR UR STRIP.AUTO: <=1.005
WBC # BLD AUTO: 4.9 K/UL (ref 4.8–10.8)

## 2023-04-10 PROCEDURE — 81003 URINALYSIS AUTO W/O SCOPE: CPT

## 2023-04-10 PROCEDURE — 80053 COMPREHEN METABOLIC PANEL: CPT

## 2023-04-10 PROCEDURE — 87799 DETECT AGENT NOS DNA QUANT: CPT

## 2023-04-10 PROCEDURE — 85025 COMPLETE CBC W/AUTO DIFF WBC: CPT

## 2023-04-10 PROCEDURE — 80197 ASSAY OF TACROLIMUS: CPT

## 2023-04-10 PROCEDURE — 36415 COLL VENOUS BLD VENIPUNCTURE: CPT

## 2023-04-10 NOTE — PROGRESS NOTES
Subjective:     Tere Gallegos is a 72 y.o. female who presents for Hospital Follow-up.    POST DISCHARGE CALL:  Discharge Date:4/8/2023   Date of Outreach Call: 4/10/2023 10:43 AM  Now that you're home, how are you doing? Good  Did you receive any new prescriptions? Yes  Were you able to fill those medications? No  How did you fill those prescriptions? *  If not able to fill prescriptions, why? Other (Comment)  Comment:pt was unaware rx's were sent in, will   asap    Do you have questions about your medications? No  Do you have a follow-up appointment scheduled?Yes  Any issues or paperwork you wish to discuss with your PCP? no  Does patient qualify for CCM Program? Yes  If patient qualifies, was CCM Program Introduced? No  If patient does not qualify, comment? declines  Number of Attempts: 1  Current or previous attempts completed within two business days of discharge? Yes  Provided education regarding treatment plan, medication, self-management, ADLs? Yes  Has patient completed Advance Directive? If yes, advise them to bring to appointment. No  Care Manager phone number provided? Yes  Is there anything else I can help you with? No  Chief Complaint? chest pain - With radiation to RUE and associated SOB.  Onset of symptoms at 1500 today.  SOB onset last night.  Pt noted to be shivering, denies feeling cold.  Admitting Dx? SOB  Discharge Diagnosis? pneumonia due to infectious organism  Additional Comments? n/a    HPI:   Recently hospitalized for chest pain, shortness of breath. Diagnosed with multifocal PNA, treated with antibiotics and supplemental oxygen. She completed her antibiotics in the hospital and did not require supplemental oxygen on discharge.     Continues to have chest pain which has been unchanged from the hospital, radiates to her left side that radiates up into her left neck and face. She continues to have shortness of breath which is also unchanged from the hospital.    Oxygen in  office today is 88% on room air.     Current medicines (including reconciliation performed today)  Current Outpatient Medications   Medication Sig Dispense Refill    minoxidil (LONITEN) 2.5 MG Tab Take 1 Tablet by mouth every day. 30 Tablet 0    sodium bicarbonate (SODIUM BICARBONATE) 650 MG Tab Take 2 Tablets by mouth in the morning, at noon, and at bedtime. 120 Tablet 0    thiamine (THIAMINE) 100 MG tablet Take 1 Tablet by mouth every day. 30 Tablet 0    metoprolol SR (TOPROL XL) 25 MG TABLET SR 24 HR Take 25 mg by mouth every day.      levothyroxine (SYNTHROID) 75 MCG Tab Take 1 Tablet by mouth every morning on an empty stomach. 90 Tablet 4    BD PEN NEEDLE PETE 2ND GEN USE AS DIRECTED WITH INSULIN PENS THREE TIMES DAILY BEFORE A MEAL      insulin glargine (LANTUS SOLOSTAR) 100 UNIT/ML Solution Pen-injector injection Inject 5 Units under the skin every day.      amLODIPine (NORVASC) 10 MG Tab Take 10 mg by mouth every day.      losartan (COZAAR) 100 MG Tab Take 1 Tablet by mouth every evening. 90 Tablet 3    glucose blood (ONETOUCH VERIO) strip 1 Strip by Other route as needed (on insulin checking 3-4 times a day). 150 Strip 6    Blood Glucose Monitoring Suppl (ONE TOUCH ULTRA 2) w/Device Kit 1 DEVICE 3 TIMES A DAY BEFORE MEALS. 1 Kit 0    atorvastatin (LIPITOR) 20 MG Tab Take 1 Tablet by mouth every evening. 100 Tablet 3    Lancets Use one Freestyle Pearl lancet to test blood sugar once daily . 300 Each 3    insulin aspart (NOVOLOG FLEXPEN) 100 UNIT/ML injection PEN Inject 2-8 Units under the skin 3 times a day before meals. Sliding Scale      predniSONE (DELTASONE) 5 MG Tab Take 5 mg by mouth every day.       No current facility-administered medications for this visit.       Allergies:   Patient has no known allergies.    Social History     Tobacco Use    Smoking status: Never    Smokeless tobacco: Never   Vaping Use    Vaping Use: Never used   Substance Use Topics    Alcohol use: No     Alcohol/week: 0.0 oz     "Drug use: No       ROS:  +chest pain, + shortness of breath, no fevers.   All other ROS as per HPI    Objective:     Vitals:    04/17/23 1359   BP: (!) 146/50   Patient Position: Sitting   Pulse: 68   Resp: 18   Temp: 37.1 °C (98.8 °F)   TempSrc: Temporal   SpO2: 88%   Weight: 58.5 kg (129 lb)   Height: 1.525 m (5' 0.04\")     Body mass index is 25.16 kg/m².    Physical Exam:    Constitutional: Alert, no distress.  Skin: Warm, dry, good turgor, no rashes in visible areas.  Eye: Equal, round and reactive, conjunctiva clear, lids normal.  ENMT: Lips without lesions, good dentition, oropharynx clear.  Neck: Trachea midline, no masses, no thyromegaly. No cervical or supraclavicular lymphadenopathy  Respiratory: Unlabored respiratory effort, lungs diminished throughout to auscultation, no wheezes, no ronchi.  Cardiovascular: Normal S1, S2, + murmur, 1-2+ pitting BLE edema.  Abdomen: Soft, non-tender, no masses, no hepatosplenomegaly.  Psych: Alert and oriented x3, normal affect and mood.      Assessment and Plan:   1. Multifocal pneumonia  Unstable  Despite completing antibiotic therapy patient continues to have significant chest discomfort and shortness of breath  Repeat chest x-ray to evaluate persistent infiltrates  Repeat labs  Oxygen only dropping with exertion, remaining in the low 90s at rest.   - DX-CHEST-2 VIEWS; Future  - PROCALCITONIN; Future  - proBrain Natriuretic Peptide, NT; Future  - TROPONIN; Future    2. Atrial fibrillation with RVR (HCC)  Stable on current medications, refilled Eliquis, dose lowered to 2.5 mg twice daily during recent hospitalization, they are unsure if they have this updated dose at home.  - apixaban (ELIQUIS) 2.5mg Tab; Take 1 Tablet by mouth 2 times a day.  Dispense: 180 Tablet; Refill: 1    3. Shortness of breath  Unstable  Repeat chest x-ray and blood work  - DX-CHEST-2 VIEWS; Future  - PROCALCITONIN; Future  - proBrain Natriuretic Peptide, NT; Future  - TROPONIN; Future    4. " Chest pain, unspecified type  Unstable  Likely due to recent pneumonia and persistent cough  Recurrent coronary vasospasm also a possibility.  We will check troponin to ensure it has not increased above her baseline  - PROCALCITONIN; Future  - proBrain Natriuretic Peptide, NT; Future  - TROPONIN; Future     services used throughout visit today.      - Chart and discharge summary were reviewed.   - Hospitalization and results reviewed with patient.   - Medications reviewed including instructions regarding high risk medications, dosing and side effects.  - Recommended Services: No services needed at this time  - Advance directive/POLST on file?  No     Follow-up:Return in about 2 days (around 4/19/2023) for Lab Review, symptom follow up.    Face-to-face transitional care management services with HIGH (today's visit is within days post discharge & LACE+ score 59+) medical decision complexity were provided.     LACE+ Historical Score Over Time (0-28: Low, 29-58: Medium, 59+: High): 83

## 2023-04-10 NOTE — PROGRESS NOTES
4/10: Patient outreach for TCM follow up.  Reviewed discharge instructions and new and current medications.  Patient verbalized understanding.  Scheduled appointment with PCP for 4/17/23 @2:00pm . Spoke with son for scheduling, was unable to reach pt to discuss CCM program, and son was unsure. Routing to CHW to introduce CCM as pt is high risk

## 2023-04-11 LAB
BK PLASMA INTERP, QNT NAAT NL11711: DETECTED
BK PLASMA IU/ML, QNT NAAT NL11709: ABNORMAL IU/ML
BK PLASMA LOG IU/ML, QNT NAAT NL11710: 5.67 LOG IU/ML
TACROLIMUS BLD-MCNC: 2.3 NG/ML

## 2023-04-12 LAB
BK UR INTERP, QNT NAAT NL11708: DETECTED
BK UR IU/ML, QNT NAAT NL11706: ABNORMAL IU/ML
BK UR LOG IU/ML, QNT NAAT NL11707: >8 LOG IU/ML

## 2023-04-14 ENCOUNTER — PATIENT OUTREACH (OUTPATIENT)
Dept: HEALTH INFORMATION MANAGEMENT | Facility: OTHER | Age: 73
End: 2023-04-14
Payer: MEDICARE

## 2023-04-17 ENCOUNTER — HOSPITAL ENCOUNTER (OUTPATIENT)
Dept: LAB | Facility: MEDICAL CENTER | Age: 73
End: 2023-04-17
Attending: INTERNAL MEDICINE
Payer: MEDICARE

## 2023-04-17 ENCOUNTER — HOSPITAL ENCOUNTER (OUTPATIENT)
Dept: LAB | Facility: MEDICAL CENTER | Age: 73
End: 2023-04-17
Attending: NURSE PRACTITIONER
Payer: MEDICARE

## 2023-04-17 ENCOUNTER — OFFICE VISIT (OUTPATIENT)
Dept: MEDICAL GROUP | Facility: MEDICAL CENTER | Age: 73
End: 2023-04-17
Payer: MEDICARE

## 2023-04-17 ENCOUNTER — HOSPITAL ENCOUNTER (OUTPATIENT)
Dept: RADIOLOGY | Facility: MEDICAL CENTER | Age: 73
End: 2023-04-17
Attending: NURSE PRACTITIONER
Payer: MEDICARE

## 2023-04-17 VITALS
TEMPERATURE: 98.8 F | DIASTOLIC BLOOD PRESSURE: 50 MMHG | OXYGEN SATURATION: 88 % | HEART RATE: 68 BPM | HEIGHT: 60 IN | BODY MASS INDEX: 25.32 KG/M2 | SYSTOLIC BLOOD PRESSURE: 146 MMHG | WEIGHT: 129 LBS | RESPIRATION RATE: 18 BRPM

## 2023-04-17 DIAGNOSIS — J18.9 MULTIFOCAL PNEUMONIA: ICD-10-CM

## 2023-04-17 DIAGNOSIS — R07.9 CHEST PAIN, UNSPECIFIED TYPE: ICD-10-CM

## 2023-04-17 DIAGNOSIS — R06.02 SHORTNESS OF BREATH: ICD-10-CM

## 2023-04-17 DIAGNOSIS — I48.91 ATRIAL FIBRILLATION WITH RVR (HCC): ICD-10-CM

## 2023-04-17 LAB
ANION GAP SERPL CALC-SCNC: 9 MMOL/L (ref 7–16)
BASOPHILS # BLD AUTO: 0.3 % (ref 0–1.8)
BASOPHILS # BLD: 0.01 K/UL (ref 0–0.12)
BUN SERPL-MCNC: 29 MG/DL (ref 8–22)
CALCIUM SERPL-MCNC: 9.6 MG/DL (ref 8.4–10.2)
CHLORIDE SERPL-SCNC: 110 MMOL/L (ref 96–112)
CO2 SERPL-SCNC: 21 MMOL/L (ref 20–33)
CREAT SERPL-MCNC: 1.88 MG/DL (ref 0.5–1.4)
EOSINOPHIL # BLD AUTO: 0.04 K/UL (ref 0–0.51)
EOSINOPHIL NFR BLD: 1.3 % (ref 0–6.9)
ERYTHROCYTE [DISTWIDTH] IN BLOOD BY AUTOMATED COUNT: 44.6 FL (ref 35.9–50)
FASTING STATUS PATIENT QL REPORTED: NORMAL
GFR SERPLBLD CREATININE-BSD FMLA CKD-EPI: 28 ML/MIN/1.73 M 2
GLUCOSE SERPL-MCNC: 107 MG/DL (ref 65–99)
HCT VFR BLD AUTO: 23.8 % (ref 37–47)
HGB BLD-MCNC: 7.3 G/DL (ref 12–16)
IMM GRANULOCYTES # BLD AUTO: 0.01 K/UL (ref 0–0.11)
IMM GRANULOCYTES NFR BLD AUTO: 0.3 % (ref 0–0.9)
LYMPHOCYTES # BLD AUTO: 0.27 K/UL (ref 1–4.8)
LYMPHOCYTES NFR BLD: 9.1 % (ref 22–41)
MCH RBC QN AUTO: 27.9 PG (ref 27–33)
MCHC RBC AUTO-ENTMCNC: 30.7 G/DL (ref 33.6–35)
MCV RBC AUTO: 90.8 FL (ref 81.4–97.8)
MONOCYTES # BLD AUTO: 0.27 K/UL (ref 0–0.85)
MONOCYTES NFR BLD AUTO: 9.1 % (ref 0–13.4)
NEUTROPHILS # BLD AUTO: 2.38 K/UL (ref 2–7.15)
NEUTROPHILS NFR BLD: 79.9 % (ref 44–72)
NRBC # BLD AUTO: 0 K/UL
NRBC BLD-RTO: 0 /100 WBC
NT-PROBNP SERPL IA-MCNC: 3341 PG/ML (ref 0–125)
PHOSPHATE SERPL-MCNC: 2.7 MG/DL (ref 2.5–4.5)
PLATELET # BLD AUTO: 169 K/UL (ref 164–446)
PMV BLD AUTO: 10.5 FL (ref 9–12.9)
POTASSIUM SERPL-SCNC: 4.4 MMOL/L (ref 3.6–5.5)
PROCALCITONIN SERPL-MCNC: 0.06 NG/ML
RBC # BLD AUTO: 2.62 M/UL (ref 4.2–5.4)
SODIUM SERPL-SCNC: 140 MMOL/L (ref 135–145)
TROPONIN T SERPL-MCNC: 31 NG/L (ref 6–19)
WBC # BLD AUTO: 3 K/UL (ref 4.8–10.8)

## 2023-04-17 PROCEDURE — 80048 BASIC METABOLIC PNL TOTAL CA: CPT

## 2023-04-17 PROCEDURE — 84145 PROCALCITONIN (PCT): CPT

## 2023-04-17 PROCEDURE — 71046 X-RAY EXAM CHEST 2 VIEWS: CPT

## 2023-04-17 PROCEDURE — 85025 COMPLETE CBC W/AUTO DIFF WBC: CPT

## 2023-04-17 PROCEDURE — 80197 ASSAY OF TACROLIMUS: CPT

## 2023-04-17 PROCEDURE — 84100 ASSAY OF PHOSPHORUS: CPT

## 2023-04-17 PROCEDURE — 84484 ASSAY OF TROPONIN QUANT: CPT

## 2023-04-17 PROCEDURE — 83880 ASSAY OF NATRIURETIC PEPTIDE: CPT

## 2023-04-17 PROCEDURE — 99496 TRANSJ CARE MGMT HIGH F2F 7D: CPT | Performed by: NURSE PRACTITIONER

## 2023-04-17 PROCEDURE — 36415 COLL VENOUS BLD VENIPUNCTURE: CPT

## 2023-04-17 ASSESSMENT — FIBROSIS 4 INDEX: FIB4 SCORE: 2.77

## 2023-04-17 NOTE — PROGRESS NOTES
TONA Hart [facility] 4/17 @1400. CHW used  to introduce program to pt and pts daughter Catie. Catie seemed interested in program for pt but would like to talk it over with pt and other family first. CHW asked if it would be ok if I sent more information in the mail as well as contact information. Catie and pt agreed. CHW will send letter in the mail. Pt's son Manoj will contact CHW with decision.

## 2023-04-17 NOTE — ASSESSMENT & PLAN NOTE
Admitted to hospital for chest pain, shortness of breath. Diagnosed with multifocal PNA, treated with antibiotics and supplemental oxygen. She completed her antibiotics in the hospital and did not require supplemental oxygen on discharge.     Continues to have chest pain which has been unchanged from the hospital, radiates to her left side that radiates up into her left neck and face. She continues to have shortness of breath which is also unchanged from the hospital.    Oxygen in office today was 88% on room air.

## 2023-04-18 LAB — TACROLIMUS BLD-MCNC: <2 NG/ML

## 2023-04-20 ENCOUNTER — TELEPHONE (OUTPATIENT)
Dept: CARDIOLOGY | Facility: MEDICAL CENTER | Age: 73
End: 2023-04-20

## 2023-04-20 ENCOUNTER — OFFICE VISIT (OUTPATIENT)
Dept: MEDICAL GROUP | Facility: MEDICAL CENTER | Age: 73
End: 2023-04-20
Payer: MEDICARE

## 2023-04-20 ENCOUNTER — TELEPHONE (OUTPATIENT)
Dept: CARDIOLOGY | Facility: MEDICAL CENTER | Age: 73
End: 2023-04-20
Payer: MEDICARE

## 2023-04-20 ENCOUNTER — TELEPHONE (OUTPATIENT)
Dept: MEDICAL GROUP | Facility: LAB | Age: 73
End: 2023-04-20

## 2023-04-20 ENCOUNTER — HOSPITAL ENCOUNTER (OUTPATIENT)
Dept: LAB | Facility: MEDICAL CENTER | Age: 73
End: 2023-04-20
Attending: NURSE PRACTITIONER
Payer: MEDICARE

## 2023-04-20 VITALS
DIASTOLIC BLOOD PRESSURE: 60 MMHG | HEIGHT: 60 IN | BODY MASS INDEX: 26.42 KG/M2 | WEIGHT: 134.6 LBS | RESPIRATION RATE: 16 BRPM | OXYGEN SATURATION: 94 % | HEART RATE: 66 BPM | TEMPERATURE: 98.5 F | SYSTOLIC BLOOD PRESSURE: 142 MMHG

## 2023-04-20 DIAGNOSIS — M79.89 LEG SWELLING: ICD-10-CM

## 2023-04-20 DIAGNOSIS — E03.9 ACQUIRED HYPOTHYROIDISM: ICD-10-CM

## 2023-04-20 DIAGNOSIS — D64.9 NORMOCYTIC ANEMIA: ICD-10-CM

## 2023-04-20 LAB
FERRITIN SERPL-MCNC: 1316 NG/ML (ref 10–291)
IRON SATN MFR SERPL: 22 % (ref 15–55)
IRON SERPL-MCNC: 51 UG/DL (ref 40–170)
T3 SERPL-MCNC: 54.4 NG/DL (ref 60–181)
T4 FREE SERPL-MCNC: 1.93 NG/DL (ref 0.93–1.7)
TIBC SERPL-MCNC: 229 UG/DL (ref 250–450)
TSH SERPL DL<=0.005 MIU/L-ACNC: 8.18 UIU/ML (ref 0.38–5.33)
UIBC SERPL-MCNC: 178 UG/DL (ref 110–370)
VIT B12 SERPL-MCNC: 1430 PG/ML (ref 211–911)

## 2023-04-20 PROCEDURE — 84439 ASSAY OF FREE THYROXINE: CPT

## 2023-04-20 PROCEDURE — 82728 ASSAY OF FERRITIN: CPT

## 2023-04-20 PROCEDURE — 84480 ASSAY TRIIODOTHYRONINE (T3): CPT

## 2023-04-20 PROCEDURE — 93246 EXT ECG>7D<15D RECORDING: CPT | Performed by: INTERNAL MEDICINE

## 2023-04-20 PROCEDURE — 83550 IRON BINDING TEST: CPT

## 2023-04-20 PROCEDURE — 82607 VITAMIN B-12: CPT

## 2023-04-20 PROCEDURE — 99214 OFFICE O/P EST MOD 30 MIN: CPT | Performed by: NURSE PRACTITIONER

## 2023-04-20 PROCEDURE — 36415 COLL VENOUS BLD VENIPUNCTURE: CPT

## 2023-04-20 PROCEDURE — 93248 EXT ECG>7D<15D REV&INTERPJ: CPT | Performed by: INTERNAL MEDICINE

## 2023-04-20 PROCEDURE — 84443 ASSAY THYROID STIM HORMONE: CPT

## 2023-04-20 PROCEDURE — 83540 ASSAY OF IRON: CPT

## 2023-04-20 RX ORDER — FUROSEMIDE 20 MG/1
20 TABLET ORAL 2 TIMES DAILY
COMMUNITY
End: 2023-04-25

## 2023-04-20 ASSESSMENT — FIBROSIS 4 INDEX: FIB4 SCORE: 2.77

## 2023-04-20 NOTE — ASSESSMENT & PLAN NOTE
Ongoing for the past several weeks.  Was hospitalized recently for pneumonia.  Does have underlying chronic kidney disease and heart failure.  Recent repeat BNP was stable since recent hospitalization.  She was advised to take her furosemide 20 mg daily for the past 3 days, she has not noted any improvement in her leg swelling.  Thyroid labs done January and February showed significant undertreatment, has not done repeat labs yet.  Levothyroxine was increased from 50 to 75 mcg since then.    Still has some residual shortness of breath since recent hospitalization.  Chest x-ray done a few days ago was stable.  No worsening infiltrates or consolidations.

## 2023-04-20 NOTE — PROGRESS NOTES
Subjective:   Tere Gallegos is a 72 y.o. female here today for lab follow-up, leg swelling    Acquired hypothyroidism  Chronic. Taking levothyroxine 75 mcg daily per endocrinology, increased from 50 mcg a few days ago due to recent labs showing under treatment.  Has not yet done repeat labs, will do today.    Leg swelling  Ongoing for the past several weeks.  Was hospitalized recently for pneumonia.  Does have underlying chronic kidney disease and heart failure.  Recent repeat BNP was stable since recent hospitalization.  She was advised to take her furosemide 20 mg daily for the past 3 days, she has not noted any improvement in her leg swelling.  Thyroid labs done January and February showed significant undertreatment, has not done repeat labs yet.  Levothyroxine was increased from 50 to 75 mcg since then.    Still has some residual shortness of breath since recent hospitalization.  Chest x-ray done a few days ago was stable.  No worsening infiltrates or consolidations.           Current medicines (including changes today)  Current Outpatient Medications   Medication Sig Dispense Refill    furosemide (LASIX) 20 MG Tab Take 20 mg by mouth 2 times a day.      apixaban (ELIQUIS) 2.5mg Tab Take 1 Tablet by mouth 2 times a day. 180 Tablet 1    minoxidil (LONITEN) 2.5 MG Tab Take 1 Tablet by mouth every day. 30 Tablet 0    sodium bicarbonate (SODIUM BICARBONATE) 650 MG Tab Take 2 Tablets by mouth in the morning, at noon, and at bedtime. 120 Tablet 0    thiamine (THIAMINE) 100 MG tablet Take 1 Tablet by mouth every day. 30 Tablet 0    metoprolol SR (TOPROL XL) 25 MG TABLET SR 24 HR Take 25 mg by mouth every day.      levothyroxine (SYNTHROID) 75 MCG Tab Take 1 Tablet by mouth every morning on an empty stomach. 90 Tablet 4    BD PEN NEEDLE PETE 2ND GEN USE AS DIRECTED WITH INSULIN PENS THREE TIMES DAILY BEFORE A MEAL      insulin glargine (LANTUS SOLOSTAR) 100 UNIT/ML Solution Pen-injector injection Inject 5  Units under the skin every day.      amLODIPine (NORVASC) 10 MG Tab Take 10 mg by mouth every day.      losartan (COZAAR) 100 MG Tab Take 1 Tablet by mouth every evening. 90 Tablet 3    glucose blood (ONETOUCH VERIO) strip 1 Strip by Other route as needed (on insulin checking 3-4 times a day). 150 Strip 6    Blood Glucose Monitoring Suppl (ONE TOUCH ULTRA 2) w/Device Kit 1 DEVICE 3 TIMES A DAY BEFORE MEALS. 1 Kit 0    atorvastatin (LIPITOR) 20 MG Tab Take 1 Tablet by mouth every evening. 100 Tablet 3    Lancets Use one Freestyle Pearl lancet to test blood sugar once daily . 300 Each 3    insulin aspart (NOVOLOG FLEXPEN) 100 UNIT/ML injection PEN Inject 2-8 Units under the skin 3 times a day before meals. Sliding Scale      predniSONE (DELTASONE) 5 MG Tab Take 5 mg by mouth every day.       No current facility-administered medications for this visit.     She  has a past medical history of Acquired hypothyroidism (05/04/2020), CAD (coronary artery disease), Chronic diastolic heart failure ([facility]) (05/04/2020), Coronary artery disease due to lipid rich plaque, Dental disorder, Diabetes ([facility]), ESRD (end stage renal disease) on dialysis ([facility]) (05/04/2020), Hemodialysis patient ([facility]), Hyperlipidemia, Hypertension, Kidney transplant candidate, Kidney transplant recipient (10/31/2022), Presence of drug-eluting stent in right coronary artery, QT prolongation (01/22/2020), RLS (restless legs syndrome) (08/05/2016), and Transaminitis (12/22/2018).    ROS   No chest pain, no shortness of breath, no abdominal pain  Positive ROS as per HPI.  All other systems reviewed and are negative.     Objective:     BP (!) 142/60 (BP Location: Right arm, Patient Position: Sitting, BP Cuff Size: Adult)   Pulse 66   Temp 36.9 °C (98.5 °F) (Temporal)   Resp 16   Ht 1.524 m (5')   Wt 61.1 kg (134 lb 9.6 oz)   SpO2 94%  Body mass index is 26.29 kg/m².     Physical Exam:  Constitutional: Alert, no distress.  Skin: Warm, dry, good turgor, no  rashes in visible areas.  Eye: Equal, round and reactive, conjunctiva clear, lids normal.  ENMT: Lips without lesions, good dentition  Respiratory: Unlabored respiratory effort, lungs clear to auscultation, no wheezes, no ronchi.  Cardiovascular: Normal S1, S2, no murmur, 2+ BLE pitting edema  Psych: Alert and oriented x3, normal affect and mood.        Assessment and Plan:   The following treatment plan was discussed    1. Acquired hypothyroidism  Unstable  Repeat labs today  Continue levothyroxine 75 mcg daily for now  - TSH; Future  - FREE THYROXINE; Future    2. Leg swelling  Unstable  Repeat thyroid labs  Symptoms not improved with furosemide, chest x-ray and BNP stable, likely not CHF exacerbation.  Possibly related to CKD      Followup: Return in about 4 weeks (around 5/18/2023) for Lab Review.    The MA is performing the above orders under the direction of Dr. Priest    Please note that this dictation was created using voice recognition software. I have made every reasonable attempt to correct obvious errors, but I expect that there are errors of grammar and possibly content that I did not discover before finalizing the note.

## 2023-04-20 NOTE — TELEPHONE ENCOUNTER
----- Message from ANGELITA Concepcion sent at 4/17/2023  5:32 PM PDT -----  ##[place] Islander speaking only ##    Please call and notify patient that her blood work looked okay.  Her chest x-ray shows that she still has some fluid in her lungs.  I would like her to t increase her water pill, furosemide, to 2 pills/day for the next 2 days until I see her on Wednesday.    Also the lab did not do her thyroid tests that were ordered early March.  Please have her go back to the lab tomorrow to do these before our appointment on Wednesday.    ANGELITA Concepcion

## 2023-04-20 NOTE — ASSESSMENT & PLAN NOTE
Chronic. Taking levothyroxine 75 mcg daily per endocrinology, increased from 50 mcg a few days ago due to recent labs showing under treatment.  Has not yet done repeat labs, will do today.

## 2023-04-20 NOTE — TELEPHONE ENCOUNTER
Phone Number Called: [phone]    Call outcome:  Spoke with patients Son    Message: Called to inform patient of LH results below.     -----------------------------------------------------------------------  ----- Message from ANGELITA Ortega sent at 4/20/2023 11:40 AM PDT -----  No afib on heart monitor

## 2023-04-21 ENCOUNTER — TELEPHONE (OUTPATIENT)
Dept: MEDICAL GROUP | Facility: MEDICAL CENTER | Age: 73
End: 2023-04-21
Payer: MEDICARE

## 2023-04-21 NOTE — TELEPHONE ENCOUNTER
----- Message from ANGELITA Concepcion sent at 4/17/2023  5:32 PM PDT -----  ##Filipino speaking only ##    Please call and notify patient that her blood work looked okay.  Her chest x-ray shows that she still has some fluid in her lungs.  I would like her to t increase her water pill, furosemide, to 2 pills/day for the next 2 days until I see her on Wednesday.    Also the lab did not do her thyroid tests that were ordered early March.  Please have her go back to the lab tomorrow to do these before our appointment on Wednesday.    ANGELITA Concepcion

## 2023-04-24 ENCOUNTER — HOSPITAL ENCOUNTER (OUTPATIENT)
Dept: LAB | Facility: MEDICAL CENTER | Age: 73
End: 2023-04-24
Attending: INTERNAL MEDICINE
Payer: MEDICARE

## 2023-04-24 LAB
ANION GAP SERPL CALC-SCNC: 11 MMOL/L (ref 7–16)
BASOPHILS # BLD AUTO: 0.3 % (ref 0–1.8)
BASOPHILS # BLD: 0.01 K/UL (ref 0–0.12)
BUN SERPL-MCNC: 29 MG/DL (ref 8–22)
CALCIUM SERPL-MCNC: 9.7 MG/DL (ref 8.4–10.2)
CHLORIDE SERPL-SCNC: 109 MMOL/L (ref 96–112)
CO2 SERPL-SCNC: 20 MMOL/L (ref 20–33)
CREAT SERPL-MCNC: 1.79 MG/DL (ref 0.5–1.4)
EOSINOPHIL # BLD AUTO: 0.04 K/UL (ref 0–0.51)
EOSINOPHIL NFR BLD: 1.1 % (ref 0–6.9)
ERYTHROCYTE [DISTWIDTH] IN BLOOD BY AUTOMATED COUNT: 50.5 FL (ref 35.9–50)
GFR SERPLBLD CREATININE-BSD FMLA CKD-EPI: 30 ML/MIN/1.73 M 2
GLUCOSE SERPL-MCNC: 99 MG/DL (ref 65–99)
HCT VFR BLD AUTO: 28.9 % (ref 37–47)
HGB BLD-MCNC: 8.7 G/DL (ref 12–16)
IMM GRANULOCYTES # BLD AUTO: 0.02 K/UL (ref 0–0.11)
IMM GRANULOCYTES NFR BLD AUTO: 0.6 % (ref 0–0.9)
LYMPHOCYTES # BLD AUTO: 0.4 K/UL (ref 1–4.8)
LYMPHOCYTES NFR BLD: 11.1 % (ref 22–41)
MCH RBC QN AUTO: 28 PG (ref 27–33)
MCHC RBC AUTO-ENTMCNC: 30.1 G/DL (ref 33.6–35)
MCV RBC AUTO: 92.9 FL (ref 81.4–97.8)
MONOCYTES # BLD AUTO: 0.34 K/UL (ref 0–0.85)
MONOCYTES NFR BLD AUTO: 9.4 % (ref 0–13.4)
NEUTROPHILS # BLD AUTO: 2.79 K/UL (ref 2–7.15)
NEUTROPHILS NFR BLD: 77.5 % (ref 44–72)
NRBC # BLD AUTO: 0 K/UL
NRBC BLD-RTO: 0 /100 WBC
PHOSPHATE SERPL-MCNC: 2.8 MG/DL (ref 2.5–4.5)
PLATELET # BLD AUTO: 151 K/UL (ref 164–446)
PMV BLD AUTO: 10.8 FL (ref 9–12.9)
POTASSIUM SERPL-SCNC: 4 MMOL/L (ref 3.6–5.5)
RBC # BLD AUTO: 3.11 M/UL (ref 4.2–5.4)
SODIUM SERPL-SCNC: 140 MMOL/L (ref 135–145)
WBC # BLD AUTO: 3.6 K/UL (ref 4.8–10.8)

## 2023-04-24 PROCEDURE — 36415 COLL VENOUS BLD VENIPUNCTURE: CPT

## 2023-04-24 PROCEDURE — 80197 ASSAY OF TACROLIMUS: CPT

## 2023-04-24 PROCEDURE — 80048 BASIC METABOLIC PNL TOTAL CA: CPT

## 2023-04-24 PROCEDURE — 84100 ASSAY OF PHOSPHORUS: CPT

## 2023-04-24 PROCEDURE — 85025 COMPLETE CBC W/AUTO DIFF WBC: CPT

## 2023-04-25 ENCOUNTER — OFFICE VISIT (OUTPATIENT)
Dept: NEPHROLOGY | Facility: MEDICAL CENTER | Age: 73
End: 2023-04-25
Payer: MEDICARE

## 2023-04-25 VITALS
OXYGEN SATURATION: 97 % | SYSTOLIC BLOOD PRESSURE: 124 MMHG | WEIGHT: 136 LBS | HEIGHT: 60 IN | BODY MASS INDEX: 26.7 KG/M2 | HEART RATE: 67 BPM | RESPIRATION RATE: 18 BRPM | TEMPERATURE: 98.2 F | DIASTOLIC BLOOD PRESSURE: 84 MMHG

## 2023-04-25 DIAGNOSIS — N18.6 ESRD (END STAGE RENAL DISEASE) (HCC): ICD-10-CM

## 2023-04-25 DIAGNOSIS — I48.21 PERMANENT ATRIAL FIBRILLATION (HCC): ICD-10-CM

## 2023-04-25 DIAGNOSIS — D61.818 PANCYTOPENIA (HCC): ICD-10-CM

## 2023-04-25 DIAGNOSIS — N18.32 STAGE 3B CHRONIC KIDNEY DISEASE: ICD-10-CM

## 2023-04-25 DIAGNOSIS — Z94.0 KIDNEY TRANSPLANT RECIPIENT: ICD-10-CM

## 2023-04-25 DIAGNOSIS — Z79.899 LONG TERM CURRENT USE OF IMMUNOSUPPRESSIVE DRUG: ICD-10-CM

## 2023-04-25 DIAGNOSIS — B34.8 BK VIREMIA: ICD-10-CM

## 2023-04-25 PROBLEM — I48.91 ATRIAL FIBRILLATION WITH RVR (HCC): Status: RESOLVED | Noted: 2023-02-18 | Resolved: 2023-04-25

## 2023-04-25 PROBLEM — Z79.60 LONG TERM CURRENT USE OF IMMUNOSUPPRESSIVE DRUG: Chronic | Status: ACTIVE | Noted: 2023-04-25

## 2023-04-25 PROBLEM — Z79.60 LONG TERM CURRENT USE OF IMMUNOSUPPRESSIVE DRUG: Status: ACTIVE | Noted: 2023-04-25

## 2023-04-25 PROCEDURE — 99214 OFFICE O/P EST MOD 30 MIN: CPT | Performed by: INTERNAL MEDICINE

## 2023-04-25 RX ORDER — TACROLIMUS 1 MG/1
CAPSULE ORAL
COMMUNITY
Start: 2023-04-11 | End: 2023-04-25

## 2023-04-25 RX ORDER — MYCOPHENOLATE MOFETIL 250 MG/1
250 CAPSULE ORAL 2 TIMES DAILY
Qty: 20 CAPSULE | Refills: 0 | Status: SHIPPED | OUTPATIENT
Start: 2023-04-25

## 2023-04-25 RX ORDER — TACROLIMUS 1 MG/1
1 CAPSULE ORAL 2 TIMES DAILY
Qty: 180 CAPSULE | Refills: 3 | Status: SHIPPED | OUTPATIENT
Start: 2023-04-25 | End: 2023-04-26

## 2023-04-25 RX ORDER — FUROSEMIDE 40 MG/1
40 TABLET ORAL 2 TIMES DAILY
Qty: 180 TABLET | Refills: 3 | Status: SHIPPED | OUTPATIENT
Start: 2023-04-25 | End: 2023-06-06 | Stop reason: SDUPTHER

## 2023-04-25 RX ORDER — MYCOPHENOLATE MOFETIL 500 MG/1
500 TABLET ORAL DAILY
COMMUNITY
Start: 2023-04-11 | End: 2023-04-25 | Stop reason: DRUGHIGH

## 2023-04-25 ASSESSMENT — ENCOUNTER SYMPTOMS
FEVER: 0
ABDOMINAL PAIN: 0
SHORTNESS OF BREATH: 0
PALPITATIONS: 1

## 2023-04-25 ASSESSMENT — FIBROSIS 4 INDEX: FIB4 SCORE: 3.1

## 2023-04-25 NOTE — Clinical Note
Please look up fax number for Dr[facility] Le[facility] Myrick at [facility] in [place], and print and fax my note to him please.   Please also fax my note (attn: Dr. Tammy Paris) to [facility] [address] [address]  Priyank Villar M.D.

## 2023-04-27 LAB — TACROLIMUS BLD-MCNC: 2.4 NG/ML

## 2023-05-01 ENCOUNTER — HOSPITAL ENCOUNTER (OUTPATIENT)
Dept: LAB | Facility: MEDICAL CENTER | Age: 73
End: 2023-05-01
Attending: INTERNAL MEDICINE
Payer: MEDICARE

## 2023-05-01 LAB
ALBUMIN SERPL BCP-MCNC: 4.2 G/DL (ref 3.2–4.9)
ALBUMIN/GLOB SERPL: 1.8 G/DL
ALP SERPL-CCNC: 87 U/L (ref 30–99)
ALT SERPL-CCNC: 43 U/L (ref 2–50)
ANION GAP SERPL CALC-SCNC: 14 MMOL/L (ref 7–16)
APPEARANCE UR: CLEAR
AST SERPL-CCNC: 26 U/L (ref 12–45)
BACTERIA #/AREA URNS HPF: NEGATIVE /HPF
BASOPHILS # BLD AUTO: 0.6 % (ref 0–1.8)
BASOPHILS # BLD: 0.02 K/UL (ref 0–0.12)
BILIRUB SERPL-MCNC: 0.6 MG/DL (ref 0.1–1.5)
BILIRUB UR QL STRIP.AUTO: NEGATIVE
BUN SERPL-MCNC: 35 MG/DL (ref 8–22)
CALCIUM ALBUM COR SERPL-MCNC: 9.3 MG/DL (ref 8.5–10.5)
CALCIUM SERPL-MCNC: 9.5 MG/DL (ref 8.4–10.2)
CHLORIDE SERPL-SCNC: 105 MMOL/L (ref 96–112)
CO2 SERPL-SCNC: 22 MMOL/L (ref 20–33)
COLOR UR: YELLOW
CREAT SERPL-MCNC: 2.08 MG/DL (ref 0.5–1.4)
EOSINOPHIL # BLD AUTO: 0.05 K/UL (ref 0–0.51)
EOSINOPHIL NFR BLD: 1.6 % (ref 0–6.9)
EPI CELLS #/AREA URNS HPF: ABNORMAL /HPF
ERYTHROCYTE [DISTWIDTH] IN BLOOD BY AUTOMATED COUNT: 50.2 FL (ref 35.9–50)
GFR SERPLBLD CREATININE-BSD FMLA CKD-EPI: 25 ML/MIN/1.73 M 2
GLOBULIN SER CALC-MCNC: 2.4 G/DL (ref 1.9–3.5)
GLUCOSE SERPL-MCNC: 92 MG/DL (ref 65–99)
GLUCOSE UR STRIP.AUTO-MCNC: NEGATIVE MG/DL
HCT VFR BLD AUTO: 30.3 % (ref 37–47)
HGB BLD-MCNC: 9.4 G/DL (ref 12–16)
IMM GRANULOCYTES # BLD AUTO: 0.01 K/UL (ref 0–0.11)
IMM GRANULOCYTES NFR BLD AUTO: 0.3 % (ref 0–0.9)
KETONES UR STRIP.AUTO-MCNC: NEGATIVE MG/DL
LEUKOCYTE ESTERASE UR QL STRIP.AUTO: NEGATIVE
LYMPHOCYTES # BLD AUTO: 0.35 K/UL (ref 1–4.8)
LYMPHOCYTES NFR BLD: 11.1 % (ref 22–41)
MCH RBC QN AUTO: 28.1 PG (ref 27–33)
MCHC RBC AUTO-ENTMCNC: 31 G/DL (ref 33.6–35)
MCV RBC AUTO: 90.4 FL (ref 81.4–97.8)
MICRO URNS: ABNORMAL
MONOCYTES # BLD AUTO: 0.31 K/UL (ref 0–0.85)
MONOCYTES NFR BLD AUTO: 9.9 % (ref 0–13.4)
NEUTROPHILS # BLD AUTO: 2.4 K/UL (ref 2–7.15)
NEUTROPHILS NFR BLD: 76.5 % (ref 44–72)
NITRITE UR QL STRIP.AUTO: NEGATIVE
NRBC # BLD AUTO: 0 K/UL
NRBC BLD-RTO: 0 /100 WBC
PH UR STRIP.AUTO: 6.5 [PH] (ref 5–8)
PLATELET # BLD AUTO: 130 K/UL (ref 164–446)
PMV BLD AUTO: 12.8 FL (ref 9–12.9)
POTASSIUM SERPL-SCNC: 3.6 MMOL/L (ref 3.6–5.5)
PROT SERPL-MCNC: 6.6 G/DL (ref 6–8.2)
PROT UR QL STRIP: NEGATIVE MG/DL
RBC # BLD AUTO: 3.35 M/UL (ref 4.2–5.4)
RBC # URNS HPF: ABNORMAL /HPF
RBC UR QL AUTO: ABNORMAL
SODIUM SERPL-SCNC: 141 MMOL/L (ref 135–145)
SP GR UR STRIP.AUTO: <=1.005
WBC # BLD AUTO: 3.1 K/UL (ref 4.8–10.8)
WBC #/AREA URNS HPF: ABNORMAL /HPF

## 2023-05-01 PROCEDURE — 85025 COMPLETE CBC W/AUTO DIFF WBC: CPT

## 2023-05-01 PROCEDURE — 87799 DETECT AGENT NOS DNA QUANT: CPT

## 2023-05-01 PROCEDURE — 80197 ASSAY OF TACROLIMUS: CPT

## 2023-05-01 PROCEDURE — 81001 URINALYSIS AUTO W/SCOPE: CPT

## 2023-05-01 PROCEDURE — 80053 COMPREHEN METABOLIC PANEL: CPT

## 2023-05-01 PROCEDURE — 36415 COLL VENOUS BLD VENIPUNCTURE: CPT

## 2023-05-02 LAB
BK PLASMA INTERP, QNT NAAT NL11711: DETECTED
BK PLASMA IU/ML, QNT NAAT NL11709: ABNORMAL IU/ML
BK PLASMA LOG IU/ML, QNT NAAT NL11710: 4.58 LOG IU/ML
BK UR INTERP, QNT NAAT NL11708: DETECTED
BK UR IU/ML, QNT NAAT NL11706: ABNORMAL IU/ML
BK UR LOG IU/ML, QNT NAAT NL11707: 7.47 LOG IU/ML

## 2023-05-03 LAB — TACROLIMUS BLD-MCNC: 3.1 NG/ML (ref 5–20)

## 2023-05-15 ENCOUNTER — HOSPITAL ENCOUNTER (OUTPATIENT)
Dept: LAB | Facility: MEDICAL CENTER | Age: 73
End: 2023-05-15
Attending: INTERNAL MEDICINE
Payer: MEDICARE

## 2023-05-15 LAB
ANION GAP SERPL CALC-SCNC: 13 MMOL/L (ref 7–16)
BASOPHILS # BLD AUTO: 0.6 % (ref 0–1.8)
BASOPHILS # BLD: 0.02 K/UL (ref 0–0.12)
BUN SERPL-MCNC: 51 MG/DL (ref 8–22)
CALCIUM SERPL-MCNC: 10.1 MG/DL (ref 8.4–10.2)
CHLORIDE SERPL-SCNC: 102 MMOL/L (ref 96–112)
CO2 SERPL-SCNC: 23 MMOL/L (ref 20–33)
CREAT SERPL-MCNC: 2.44 MG/DL (ref 0.5–1.4)
EOSINOPHIL # BLD AUTO: 0.04 K/UL (ref 0–0.51)
EOSINOPHIL NFR BLD: 1.1 % (ref 0–6.9)
ERYTHROCYTE [DISTWIDTH] IN BLOOD BY AUTOMATED COUNT: 46.8 FL (ref 35.9–50)
GFR SERPLBLD CREATININE-BSD FMLA CKD-EPI: 20 ML/MIN/1.73 M 2
GLUCOSE SERPL-MCNC: 108 MG/DL (ref 65–99)
HCT VFR BLD AUTO: 34.8 % (ref 37–47)
HGB BLD-MCNC: 10.8 G/DL (ref 12–16)
IMM GRANULOCYTES # BLD AUTO: 0.01 K/UL (ref 0–0.11)
IMM GRANULOCYTES NFR BLD AUTO: 0.3 % (ref 0–0.9)
LYMPHOCYTES # BLD AUTO: 0.43 K/UL (ref 1–4.8)
LYMPHOCYTES NFR BLD: 12.4 % (ref 22–41)
MCH RBC QN AUTO: 27.8 PG (ref 27–33)
MCHC RBC AUTO-ENTMCNC: 31 G/DL (ref 33.6–35)
MCV RBC AUTO: 89.5 FL (ref 81.4–97.8)
MONOCYTES # BLD AUTO: 0.36 K/UL (ref 0–0.85)
MONOCYTES NFR BLD AUTO: 10.3 % (ref 0–13.4)
NEUTROPHILS # BLD AUTO: 2.62 K/UL (ref 2–7.15)
NEUTROPHILS NFR BLD: 75.3 % (ref 44–72)
NRBC # BLD AUTO: 0 K/UL
NRBC BLD-RTO: 0 /100 WBC
PHOSPHATE SERPL-MCNC: 3.2 MG/DL (ref 2.5–4.5)
PLATELET # BLD AUTO: 96 K/UL (ref 164–446)
PMV BLD AUTO: 11 FL (ref 9–12.9)
POTASSIUM SERPL-SCNC: 3.8 MMOL/L (ref 3.6–5.5)
RBC # BLD AUTO: 3.89 M/UL (ref 4.2–5.4)
SODIUM SERPL-SCNC: 138 MMOL/L (ref 135–145)
WBC # BLD AUTO: 3.5 K/UL (ref 4.8–10.8)

## 2023-05-15 PROCEDURE — 36415 COLL VENOUS BLD VENIPUNCTURE: CPT

## 2023-05-15 PROCEDURE — 84100 ASSAY OF PHOSPHORUS: CPT

## 2023-05-15 PROCEDURE — 85025 COMPLETE CBC W/AUTO DIFF WBC: CPT

## 2023-05-15 PROCEDURE — 80197 ASSAY OF TACROLIMUS: CPT

## 2023-05-15 PROCEDURE — 80048 BASIC METABOLIC PNL TOTAL CA: CPT

## 2023-05-17 ENCOUNTER — APPOINTMENT (OUTPATIENT)
Dept: RADIOLOGY | Facility: MEDICAL CENTER | Age: 73
End: 2023-05-17
Attending: EMERGENCY MEDICINE
Payer: MEDICARE

## 2023-05-17 ENCOUNTER — HOSPITAL ENCOUNTER (INPATIENT)
Facility: MEDICAL CENTER | Age: 73
LOS: 3 days | DRG: 698 | End: 2023-05-20
Attending: STUDENT IN AN ORGANIZED HEALTH CARE EDUCATION/TRAINING PROGRAM | Admitting: HOSPITALIST
Payer: MEDICARE

## 2023-05-17 ENCOUNTER — HOSPITAL ENCOUNTER (EMERGENCY)
Facility: MEDICAL CENTER | Age: 73
End: 2023-05-17
Attending: EMERGENCY MEDICINE | Admitting: HOSPITALIST
Payer: MEDICARE

## 2023-05-17 VITALS
WEIGHT: 125 LBS | TEMPERATURE: 97.6 F | OXYGEN SATURATION: 95 % | RESPIRATION RATE: 15 BRPM | BODY MASS INDEX: 21.34 KG/M2 | HEIGHT: 64 IN | SYSTOLIC BLOOD PRESSURE: 140 MMHG | HEART RATE: 60 BPM | DIASTOLIC BLOOD PRESSURE: 58 MMHG

## 2023-05-17 DIAGNOSIS — N28.9 ACUTE RENAL INSUFFICIENCY: ICD-10-CM

## 2023-05-17 DIAGNOSIS — N13.39 OTHER HYDRONEPHROSIS: ICD-10-CM

## 2023-05-17 DIAGNOSIS — N18.9 ACUTE KIDNEY INJURY SUPERIMPOSED ON CHRONIC KIDNEY DISEASE (HCC): ICD-10-CM

## 2023-05-17 DIAGNOSIS — N17.9 ACUTE KIDNEY INJURY SUPERIMPOSED ON CHRONIC KIDNEY DISEASE (HCC): ICD-10-CM

## 2023-05-17 DIAGNOSIS — Z94.0 RENAL TRANSPLANT RECIPIENT: ICD-10-CM

## 2023-05-17 PROBLEM — N13.30 HYDRONEPHROSIS: Status: ACTIVE | Noted: 2023-05-17

## 2023-05-17 LAB
ALBUMIN SERPL BCP-MCNC: 4.5 G/DL (ref 3.2–4.9)
ALBUMIN/GLOB SERPL: 1.9 G/DL
ALP SERPL-CCNC: 90 U/L (ref 30–99)
ALT SERPL-CCNC: 29 U/L (ref 2–50)
ANION GAP SERPL CALC-SCNC: 12 MMOL/L (ref 7–16)
AST SERPL-CCNC: 22 U/L (ref 12–45)
BASOPHILS # BLD AUTO: 0.2 % (ref 0–1.8)
BASOPHILS # BLD: 0.01 K/UL (ref 0–0.12)
BILIRUB SERPL-MCNC: 0.5 MG/DL (ref 0.1–1.5)
BUN SERPL-MCNC: 53 MG/DL (ref 8–22)
CA-I SERPL-SCNC: 1.22 MMOL/L (ref 1.1–1.3)
CALCIUM ALBUM COR SERPL-MCNC: 9.7 MG/DL (ref 8.5–10.5)
CALCIUM SERPL-MCNC: 10.1 MG/DL (ref 8.4–10.2)
CHLORIDE SERPL-SCNC: 107 MMOL/L (ref 96–112)
CO2 SERPL-SCNC: 23 MMOL/L (ref 20–33)
CREAT SERPL-MCNC: 2.1 MG/DL (ref 0.5–1.4)
EOSINOPHIL # BLD AUTO: 0.04 K/UL (ref 0–0.51)
EOSINOPHIL NFR BLD: 0.8 % (ref 0–6.9)
ERYTHROCYTE [DISTWIDTH] IN BLOOD BY AUTOMATED COUNT: 47.3 FL (ref 35.9–50)
ERYTHROCYTE [SEDIMENTATION RATE] IN BLOOD BY WESTERGREN METHOD: 12 MM/HOUR (ref 0–25)
GFR SERPLBLD CREATININE-BSD FMLA CKD-EPI: 24 ML/MIN/1.73 M 2
GLOBULIN SER CALC-MCNC: 2.4 G/DL (ref 1.9–3.5)
GLUCOSE BLD STRIP.AUTO-MCNC: 107 MG/DL (ref 65–99)
GLUCOSE BLD STRIP.AUTO-MCNC: 134 MG/DL (ref 65–99)
GLUCOSE SERPL-MCNC: 151 MG/DL (ref 65–99)
HCT VFR BLD AUTO: 34.5 % (ref 37–47)
HGB BLD-MCNC: 10.8 G/DL (ref 12–16)
IMM GRANULOCYTES # BLD AUTO: 0.02 K/UL (ref 0–0.11)
IMM GRANULOCYTES NFR BLD AUTO: 0.4 % (ref 0–0.9)
LYMPHOCYTES # BLD AUTO: 0.25 K/UL (ref 1–4.8)
LYMPHOCYTES NFR BLD: 5.2 % (ref 22–41)
MAGNESIUM SERPL-MCNC: 2 MG/DL (ref 1.5–2.5)
MCH RBC QN AUTO: 28 PG (ref 27–33)
MCHC RBC AUTO-ENTMCNC: 31.3 G/DL (ref 33.6–35)
MCV RBC AUTO: 89.4 FL (ref 81.4–97.8)
MONOCYTES # BLD AUTO: 0.37 K/UL (ref 0–0.85)
MONOCYTES NFR BLD AUTO: 7.7 % (ref 0–13.4)
NEUTROPHILS # BLD AUTO: 4.11 K/UL (ref 2–7.15)
NEUTROPHILS NFR BLD: 85.7 % (ref 44–72)
NRBC # BLD AUTO: 0 K/UL
NRBC BLD-RTO: 0 /100 WBC
NT-PROBNP SERPL IA-MCNC: 1104 PG/ML (ref 0–125)
PHOSPHATE SERPL-MCNC: 3.1 MG/DL (ref 2.5–4.5)
PLATELET # BLD AUTO: 126 K/UL (ref 164–446)
PMV BLD AUTO: 12.5 FL (ref 9–12.9)
POTASSIUM SERPL-SCNC: 4.3 MMOL/L (ref 3.6–5.5)
PROT SERPL-MCNC: 6.9 G/DL (ref 6–8.2)
RBC # BLD AUTO: 3.86 M/UL (ref 4.2–5.4)
SODIUM SERPL-SCNC: 142 MMOL/L (ref 135–145)
URATE SERPL-MCNC: 8.3 MG/DL (ref 1.9–8.2)
WBC # BLD AUTO: 4.8 K/UL (ref 4.8–10.8)

## 2023-05-17 PROCEDURE — 82330 ASSAY OF CALCIUM: CPT

## 2023-05-17 PROCEDURE — 770006 HCHG ROOM/CARE - MED/SURG/GYN SEMI*

## 2023-05-17 PROCEDURE — 700105 HCHG RX REV CODE 258: Performed by: HOSPITALIST

## 2023-05-17 PROCEDURE — 76776 US EXAM K TRANSPL W/DOPPLER: CPT

## 2023-05-17 PROCEDURE — 36415 COLL VENOUS BLD VENIPUNCTURE: CPT

## 2023-05-17 PROCEDURE — 82962 GLUCOSE BLOOD TEST: CPT

## 2023-05-17 PROCEDURE — 83880 ASSAY OF NATRIURETIC PEPTIDE: CPT

## 2023-05-17 PROCEDURE — 80197 ASSAY OF TACROLIMUS: CPT

## 2023-05-17 PROCEDURE — 85652 RBC SED RATE AUTOMATED: CPT

## 2023-05-17 PROCEDURE — 84100 ASSAY OF PHOSPHORUS: CPT

## 2023-05-17 PROCEDURE — 83735 ASSAY OF MAGNESIUM: CPT

## 2023-05-17 PROCEDURE — 85025 COMPLETE CBC W/AUTO DIFF WBC: CPT

## 2023-05-17 PROCEDURE — 80053 COMPREHEN METABOLIC PANEL: CPT

## 2023-05-17 PROCEDURE — 82962 GLUCOSE BLOOD TEST: CPT | Mod: 91

## 2023-05-17 PROCEDURE — 99223 1ST HOSP IP/OBS HIGH 75: CPT | Mod: AI | Performed by: HOSPITALIST

## 2023-05-17 PROCEDURE — 99284 EMERGENCY DEPT VISIT MOD MDM: CPT

## 2023-05-17 PROCEDURE — 700105 HCHG RX REV CODE 258: Performed by: EMERGENCY MEDICINE

## 2023-05-17 PROCEDURE — 84550 ASSAY OF BLOOD/URIC ACID: CPT

## 2023-05-17 RX ORDER — GAUZE BANDAGE 2" X 2"
100 BANDAGE TOPICAL DAILY
Status: DISCONTINUED | OUTPATIENT
Start: 2023-05-18 | End: 2023-05-17 | Stop reason: HOSPADM

## 2023-05-17 RX ORDER — SODIUM BICARBONATE 650 MG/1
1300 TABLET ORAL 3 TIMES DAILY
Status: CANCELLED | OUTPATIENT
Start: 2023-05-17

## 2023-05-17 RX ORDER — GAUZE BANDAGE 2" X 2"
100 BANDAGE TOPICAL DAILY
Status: DISCONTINUED | OUTPATIENT
Start: 2023-05-18 | End: 2023-05-20 | Stop reason: HOSPADM

## 2023-05-17 RX ORDER — BISACODYL 10 MG
10 SUPPOSITORY, RECTAL RECTAL
Status: DISCONTINUED | OUTPATIENT
Start: 2023-05-17 | End: 2023-05-17 | Stop reason: HOSPADM

## 2023-05-17 RX ORDER — AMOXICILLIN 250 MG
2 CAPSULE ORAL 2 TIMES DAILY
Status: DISCONTINUED | OUTPATIENT
Start: 2023-05-17 | End: 2023-05-17 | Stop reason: HOSPADM

## 2023-05-17 RX ORDER — ONDANSETRON 2 MG/ML
4 INJECTION INTRAMUSCULAR; INTRAVENOUS EVERY 4 HOURS PRN
Status: DISCONTINUED | OUTPATIENT
Start: 2023-05-17 | End: 2023-05-17 | Stop reason: HOSPADM

## 2023-05-17 RX ORDER — FUROSEMIDE 40 MG/1
40 TABLET ORAL 2 TIMES DAILY
Status: DISCONTINUED | OUTPATIENT
Start: 2023-05-17 | End: 2023-05-17 | Stop reason: HOSPADM

## 2023-05-17 RX ORDER — PREDNISONE 10 MG/1
10 TABLET ORAL DAILY
Status: CANCELLED | OUTPATIENT
Start: 2023-05-18

## 2023-05-17 RX ORDER — AMLODIPINE BESYLATE 10 MG/1
10 TABLET ORAL DAILY
Status: DISCONTINUED | OUTPATIENT
Start: 2023-05-18 | End: 2023-05-20 | Stop reason: HOSPADM

## 2023-05-17 RX ORDER — TACROLIMUS 1 MG/1
1 CAPSULE ORAL 2 TIMES DAILY
Status: CANCELLED | OUTPATIENT
Start: 2023-05-17

## 2023-05-17 RX ORDER — MINOXIDIL 2.5 MG/1
2.5 TABLET ORAL DAILY
Status: CANCELLED | OUTPATIENT
Start: 2023-05-18

## 2023-05-17 RX ORDER — ONDANSETRON 2 MG/ML
4 INJECTION INTRAMUSCULAR; INTRAVENOUS EVERY 4 HOURS PRN
Status: DISCONTINUED | OUTPATIENT
Start: 2023-05-17 | End: 2023-05-20 | Stop reason: HOSPADM

## 2023-05-17 RX ORDER — LABETALOL HYDROCHLORIDE 5 MG/ML
10 INJECTION, SOLUTION INTRAVENOUS EVERY 4 HOURS PRN
Status: DISCONTINUED | OUTPATIENT
Start: 2023-05-17 | End: 2023-05-20 | Stop reason: HOSPADM

## 2023-05-17 RX ORDER — AMLODIPINE BESYLATE 5 MG/1
10 TABLET ORAL DAILY
Status: DISCONTINUED | OUTPATIENT
Start: 2023-05-17 | End: 2023-05-17 | Stop reason: HOSPADM

## 2023-05-17 RX ORDER — MYCOPHENOLATE MOFETIL 250 MG/1
250 CAPSULE ORAL 2 TIMES DAILY
Status: DISCONTINUED | OUTPATIENT
Start: 2023-05-17 | End: 2023-05-20 | Stop reason: HOSPADM

## 2023-05-17 RX ORDER — LEVOTHYROXINE SODIUM 0.07 MG/1
75 TABLET ORAL
Status: DISCONTINUED | OUTPATIENT
Start: 2023-05-18 | End: 2023-05-20 | Stop reason: HOSPADM

## 2023-05-17 RX ORDER — METOPROLOL SUCCINATE 25 MG/1
25 TABLET, EXTENDED RELEASE ORAL DAILY
Status: DISCONTINUED | OUTPATIENT
Start: 2023-05-18 | End: 2023-05-20 | Stop reason: HOSPADM

## 2023-05-17 RX ORDER — POLYETHYLENE GLYCOL 3350 17 G/17G
1 POWDER, FOR SOLUTION ORAL
Status: DISCONTINUED | OUTPATIENT
Start: 2023-05-17 | End: 2023-05-17 | Stop reason: HOSPADM

## 2023-05-17 RX ORDER — MINOXIDIL 2.5 MG/1
2.5 TABLET ORAL DAILY
Status: DISCONTINUED | OUTPATIENT
Start: 2023-05-18 | End: 2023-05-17 | Stop reason: HOSPADM

## 2023-05-17 RX ORDER — TACROLIMUS 1 MG/1
1-2 CAPSULE ORAL 2 TIMES DAILY
Status: ON HOLD | COMMUNITY
End: 2023-09-06 | Stop reason: SDUPTHER

## 2023-05-17 RX ORDER — GAUZE BANDAGE 2" X 2"
100 BANDAGE TOPICAL DAILY
Status: CANCELLED | OUTPATIENT
Start: 2023-05-18

## 2023-05-17 RX ORDER — BISACODYL 10 MG
10 SUPPOSITORY, RECTAL RECTAL
Status: DISCONTINUED | OUTPATIENT
Start: 2023-05-17 | End: 2023-05-20 | Stop reason: HOSPADM

## 2023-05-17 RX ORDER — PREDNISONE 10 MG/1
10 TABLET ORAL DAILY
Status: DISCONTINUED | OUTPATIENT
Start: 2023-05-18 | End: 2023-05-18

## 2023-05-17 RX ORDER — LEVOTHYROXINE SODIUM 0.07 MG/1
75 TABLET ORAL
Status: DISCONTINUED | OUTPATIENT
Start: 2023-05-18 | End: 2023-05-17 | Stop reason: HOSPADM

## 2023-05-17 RX ORDER — FUROSEMIDE 40 MG/1
40 TABLET ORAL 2 TIMES DAILY
Status: DISCONTINUED | OUTPATIENT
Start: 2023-05-17 | End: 2023-05-18

## 2023-05-17 RX ORDER — BISACODYL 10 MG
10 SUPPOSITORY, RECTAL RECTAL
Status: CANCELLED | OUTPATIENT
Start: 2023-05-17

## 2023-05-17 RX ORDER — TACROLIMUS 1 MG/1
1 CAPSULE ORAL 2 TIMES DAILY
Status: DISCONTINUED | OUTPATIENT
Start: 2023-05-17 | End: 2023-05-17 | Stop reason: HOSPADM

## 2023-05-17 RX ORDER — ONDANSETRON 2 MG/ML
4 INJECTION INTRAMUSCULAR; INTRAVENOUS EVERY 4 HOURS PRN
Status: CANCELLED | OUTPATIENT
Start: 2023-05-17

## 2023-05-17 RX ORDER — POLYETHYLENE GLYCOL 3350 17 G/17G
1 POWDER, FOR SOLUTION ORAL
Status: DISCONTINUED | OUTPATIENT
Start: 2023-05-17 | End: 2023-05-20 | Stop reason: HOSPADM

## 2023-05-17 RX ORDER — LABETALOL HYDROCHLORIDE 5 MG/ML
10 INJECTION, SOLUTION INTRAVENOUS EVERY 4 HOURS PRN
Status: DISCONTINUED | OUTPATIENT
Start: 2023-05-17 | End: 2023-05-17 | Stop reason: HOSPADM

## 2023-05-17 RX ORDER — AMLODIPINE BESYLATE 5 MG/1
10 TABLET ORAL DAILY
Status: CANCELLED | OUTPATIENT
Start: 2023-05-18

## 2023-05-17 RX ORDER — TACROLIMUS 1 MG/1
1 CAPSULE ORAL 2 TIMES DAILY
Status: DISCONTINUED | OUTPATIENT
Start: 2023-05-17 | End: 2023-05-20 | Stop reason: HOSPADM

## 2023-05-17 RX ORDER — ONDANSETRON 4 MG/1
4 TABLET, ORALLY DISINTEGRATING ORAL EVERY 4 HOURS PRN
Status: DISCONTINUED | OUTPATIENT
Start: 2023-05-17 | End: 2023-05-20 | Stop reason: HOSPADM

## 2023-05-17 RX ORDER — AMOXICILLIN 250 MG
2 CAPSULE ORAL 2 TIMES DAILY
Status: CANCELLED | OUTPATIENT
Start: 2023-05-17

## 2023-05-17 RX ORDER — MYCOPHENOLATE MOFETIL 250 MG/1
250 CAPSULE ORAL 2 TIMES DAILY
Status: CANCELLED | OUTPATIENT
Start: 2023-05-17

## 2023-05-17 RX ORDER — AMOXICILLIN 250 MG
2 CAPSULE ORAL 2 TIMES DAILY
Status: DISCONTINUED | OUTPATIENT
Start: 2023-05-17 | End: 2023-05-20 | Stop reason: HOSPADM

## 2023-05-17 RX ORDER — POLYETHYLENE GLYCOL 3350 17 G/17G
1 POWDER, FOR SOLUTION ORAL
Status: CANCELLED | OUTPATIENT
Start: 2023-05-17

## 2023-05-17 RX ORDER — HEPARIN SODIUM 5000 [USP'U]/ML
5000 INJECTION, SOLUTION INTRAVENOUS; SUBCUTANEOUS EVERY 8 HOURS
Status: DISCONTINUED | OUTPATIENT
Start: 2023-05-17 | End: 2023-05-17

## 2023-05-17 RX ORDER — MYCOPHENOLATE MOFETIL 250 MG/1
250 CAPSULE ORAL 2 TIMES DAILY
Status: DISCONTINUED | OUTPATIENT
Start: 2023-05-17 | End: 2023-05-17 | Stop reason: HOSPADM

## 2023-05-17 RX ORDER — FUROSEMIDE 40 MG/1
40 TABLET ORAL 2 TIMES DAILY
Status: CANCELLED | OUTPATIENT
Start: 2023-05-17

## 2023-05-17 RX ORDER — METOPROLOL SUCCINATE 25 MG/1
25 TABLET, EXTENDED RELEASE ORAL DAILY
Status: DISCONTINUED | OUTPATIENT
Start: 2023-05-17 | End: 2023-05-17 | Stop reason: HOSPADM

## 2023-05-17 RX ORDER — SODIUM CHLORIDE 9 MG/ML
INJECTION, SOLUTION INTRAVENOUS CONTINUOUS
Status: DISCONTINUED | OUTPATIENT
Start: 2023-05-17 | End: 2023-05-17 | Stop reason: HOSPADM

## 2023-05-17 RX ORDER — ONDANSETRON 4 MG/1
4 TABLET, ORALLY DISINTEGRATING ORAL EVERY 4 HOURS PRN
Status: DISCONTINUED | OUTPATIENT
Start: 2023-05-17 | End: 2023-05-17 | Stop reason: HOSPADM

## 2023-05-17 RX ORDER — MYCOPHENOLATE MOFETIL 500 MG/1
1000 TABLET ORAL 2 TIMES DAILY
Status: SHIPPED | COMMUNITY
End: 2023-05-17

## 2023-05-17 RX ORDER — PREDNISONE 10 MG/1
10 TABLET ORAL DAILY
Status: DISCONTINUED | OUTPATIENT
Start: 2023-05-18 | End: 2023-05-17 | Stop reason: HOSPADM

## 2023-05-17 RX ORDER — METOPROLOL SUCCINATE 25 MG/1
25 TABLET, EXTENDED RELEASE ORAL DAILY
Status: CANCELLED | OUTPATIENT
Start: 2023-05-18

## 2023-05-17 RX ORDER — SODIUM BICARBONATE 650 MG/1
1300 TABLET ORAL 3 TIMES DAILY
Status: DISCONTINUED | OUTPATIENT
Start: 2023-05-17 | End: 2023-05-20 | Stop reason: HOSPADM

## 2023-05-17 RX ORDER — SODIUM CHLORIDE 9 MG/ML
INJECTION, SOLUTION INTRAVENOUS CONTINUOUS
Status: CANCELLED | OUTPATIENT
Start: 2023-05-17

## 2023-05-17 RX ORDER — ONDANSETRON 4 MG/1
4 TABLET, ORALLY DISINTEGRATING ORAL EVERY 4 HOURS PRN
Status: CANCELLED | OUTPATIENT
Start: 2023-05-17

## 2023-05-17 RX ORDER — SODIUM CHLORIDE 9 MG/ML
INJECTION, SOLUTION INTRAVENOUS CONTINUOUS
Status: DISCONTINUED | OUTPATIENT
Start: 2023-05-17 | End: 2023-05-19

## 2023-05-17 RX ORDER — ATORVASTATIN CALCIUM 10 MG/1
20 TABLET, FILM COATED ORAL NIGHTLY
Status: DISCONTINUED | OUTPATIENT
Start: 2023-05-17 | End: 2023-05-17 | Stop reason: HOSPADM

## 2023-05-17 RX ORDER — ATORVASTATIN CALCIUM 10 MG/1
20 TABLET, FILM COATED ORAL NIGHTLY
Status: CANCELLED | OUTPATIENT
Start: 2023-05-17

## 2023-05-17 RX ORDER — LEVOTHYROXINE SODIUM 0.07 MG/1
75 TABLET ORAL
Status: CANCELLED | OUTPATIENT
Start: 2023-05-18

## 2023-05-17 RX ORDER — LOSARTAN POTASSIUM 50 MG/1
100 TABLET ORAL EVERY EVENING
Status: DISCONTINUED | OUTPATIENT
Start: 2023-05-17 | End: 2023-05-20 | Stop reason: HOSPADM

## 2023-05-17 RX ORDER — LOSARTAN POTASSIUM 25 MG/1
100 TABLET ORAL EVERY EVENING
Status: DISCONTINUED | OUTPATIENT
Start: 2023-05-17 | End: 2023-05-17 | Stop reason: HOSPADM

## 2023-05-17 RX ORDER — ATORVASTATIN CALCIUM 20 MG/1
20 TABLET, FILM COATED ORAL NIGHTLY
Status: DISCONTINUED | OUTPATIENT
Start: 2023-05-17 | End: 2023-05-20 | Stop reason: HOSPADM

## 2023-05-17 RX ORDER — MINOXIDIL 2.5 MG/1
2.5 TABLET ORAL DAILY
Status: DISCONTINUED | OUTPATIENT
Start: 2023-05-18 | End: 2023-05-20 | Stop reason: HOSPADM

## 2023-05-17 RX ORDER — SODIUM BICARBONATE 650 MG/1
1300 TABLET ORAL 3 TIMES DAILY
Status: DISCONTINUED | OUTPATIENT
Start: 2023-05-17 | End: 2023-05-17 | Stop reason: HOSPADM

## 2023-05-17 RX ORDER — LABETALOL HYDROCHLORIDE 5 MG/ML
10 INJECTION, SOLUTION INTRAVENOUS EVERY 4 HOURS PRN
Status: CANCELLED | OUTPATIENT
Start: 2023-05-17

## 2023-05-17 RX ORDER — LOSARTAN POTASSIUM 25 MG/1
100 TABLET ORAL EVERY EVENING
Status: CANCELLED | OUTPATIENT
Start: 2023-05-17

## 2023-05-17 RX ADMIN — SODIUM CHLORIDE: 9 INJECTION, SOLUTION INTRAVENOUS at 21:11

## 2023-05-17 RX ADMIN — SODIUM CHLORIDE: 9 INJECTION, SOLUTION INTRAVENOUS at 12:49

## 2023-05-17 ASSESSMENT — ENCOUNTER SYMPTOMS
EYES NEGATIVE: 1
BRUISES/BLEEDS EASILY: 0
PSYCHIATRIC NEGATIVE: 1
CHILLS: 0
INSOMNIA: 0
SEIZURES: 0
EYE DISCHARGE: 0
FOCAL WEAKNESS: 0
HEARTBURN: 0
TINGLING: 0
LOSS OF CONSCIOUSNESS: 1
FEVER: 0
POLYDIPSIA: 0
CARDIOVASCULAR NEGATIVE: 1
DEPRESSION: 0
SHORTNESS OF BREATH: 0
COUGH: 0
CHILLS: 0
FEVER: 0
DOUBLE VISION: 0
EYES NEGATIVE: 1
PND: 0
GASTROINTESTINAL NEGATIVE: 1
BLOOD IN STOOL: 0
CONSTITUTIONAL NEGATIVE: 1
MYALGIAS: 0
NEUROLOGICAL NEGATIVE: 1
POLYDIPSIA: 0
SPUTUM PRODUCTION: 0
WEAKNESS: 1
FALLS: 0
SENSORY CHANGE: 0
SINUS PAIN: 0
BACK PAIN: 0
EYE DISCHARGE: 0
PND: 0
MUSCULOSKELETAL NEGATIVE: 1
DOUBLE VISION: 0
ORTHOPNEA: 0
SHORTNESS OF BREATH: 0
HEARTBURN: 0
ABDOMINAL PAIN: 0
DIZZINESS: 0
ORTHOPNEA: 0
FALLS: 0
COUGH: 0
STRIDOR: 0
FLANK PAIN: 0
DIAPHORESIS: 0
DIZZINESS: 1
EYE REDNESS: 0
DIAPHORESIS: 0
SEIZURES: 0
PHOTOPHOBIA: 0
SENSORY CHANGE: 0
STRIDOR: 0
FOCAL WEAKNESS: 0
WHEEZING: 0
PHOTOPHOBIA: 0
PSYCHIATRIC NEGATIVE: 1
RESPIRATORY NEGATIVE: 1
DEPRESSION: 0
ABDOMINAL PAIN: 1
BLOOD IN STOOL: 0
EYE REDNESS: 0
INSOMNIA: 0
MUSCULOSKELETAL NEGATIVE: 1
TINGLING: 0
BACK PAIN: 0
FLANK PAIN: 0
RESPIRATORY NEGATIVE: 1
MYALGIAS: 0
BRUISES/BLEEDS EASILY: 0
SPUTUM PRODUCTION: 0
WHEEZING: 0
SINUS PAIN: 0
CARDIOVASCULAR NEGATIVE: 1

## 2023-05-17 ASSESSMENT — CHA2DS2 SCORE
AGE 65 TO 74: YES
SEX: FEMALE
AGE 75 OR GREATER: NO
HYPERTENSION: YES
VASCULAR DISEASE: YES
PRIOR STROKE OR TIA OR THROMBOEMBOLISM: NO
CHF OR LEFT VENTRICULAR DYSFUNCTION: NO
DIABETES: YES
CHA2DS2 VASC SCORE: 5

## 2023-05-17 ASSESSMENT — FIBROSIS 4 INDEX: FIB4 SCORE: 3.02

## 2023-05-17 ASSESSMENT — VISUAL ACUITY: OU: 1

## 2023-05-17 ASSESSMENT — LIFESTYLE VARIABLES
SUBSTANCE_ABUSE: 0
SUBSTANCE_ABUSE: 0

## 2023-05-17 NOTE — ASSESSMENT & PLAN NOTE
Avoid excessive fluid overload as the patient has had peripheral swelling as well as shortness of breath in the past  Monitor BMP levels  If necessary obtain an echocardiogram  Currently patient is not fluid overloaded and does not have heart failure.

## 2023-05-17 NOTE — ASSESSMENT & PLAN NOTE
Patient has been progressively getting worse since her kidney transplant which was done in November 2022 in [facility].  She tells me that at home she was falling, she is very weak and tired, she has lost her appetite, she has headaches, nausea vomiting, and right lower quadrant abdominal pain.  The patient saw her nephrologist today in the office.  Since her kidney functions are much worse according to the nephrologist she was sent in to [facility] emergency room to be evaluated.  Patient herself says today she has no symptoms.  But ever since November she has been miserable.  The patient was evaluated with a urgent ultrasound of her kidneys in the emergency room.  Her native kidneys at this point are both atrophied and not working.  The transplant kidney at this point is affected by a hydronephrosis.  I have spoken with nephrology and they agree that we should not at this point give her excessive amounts of fluids and that urology should be involved.  I spoken with Dr. Rhodes of urology who at this point recommends transfer to [facility] for lower placing a percutaneous stent right into the collecting system.

## 2023-05-17 NOTE — H&P
Hospital Medicine History & Physical Note    Date of Service  5/17/2023    Primary Care Physician  MALGORZATA Concepcion.    Consultants  nephrology and urology    Specialist Names: Dr. Evans of nephrology  Dr. Rhodes of urology    Code Status  Full Code    Chief Complaint  Generalized ill feeling      History of Presenting Illness  Tere Gallegos is a 72 y.o. female who presented 5/17/2023 with worsening laboratory findings as an outpatient to the [facility] emergency room..  Patient was initially sent into the hospital by Dr Barraza of nephrology.  Patient has been transferred from [facility] to [facility] for procedure not available at that facility.  Patient had a transplant kidney in November 2022.  Her native kidneys are not functioning at all.  As of late her renal functions have declined and thus prompting a complete work-up and evaluation.  The patient at this point has been evaluated with an ultrasound the kidneys which at this point shows hydronephrosis of the transplant kidney.  I discussed the case with urology and they agree that the patient's hydronephrosis is severe enough that the patient needs intervention to save the transplant kidney.  Patient will need at this point interventional radiology to place a stent into the collecting system to bypass the occlusion.  Given the fact that the patient is on Eliquis the Eliquis will need to be held for 48 hours prior to interventional radiology being able to perform a percutaneous drain placement.  I spoken with Dr. Laurent of interventional radiology who has placed the procedure on the schedule however it cannot be completed until adequate time is passed without anticoagulation.    I discussed the plan of care with patient, bedside RN, and emergency room physician, urologist, nephrologist, interventional radiologist .    Review of Systems  Review of Systems   Constitutional:  Positive for malaise/fatigue. Negative for chills,  diaphoresis and fever.   HENT: Negative.  Negative for nosebleeds and sinus pain.    Eyes: Negative.  Negative for double vision, photophobia, discharge and redness.   Respiratory: Negative.  Negative for cough, sputum production, shortness of breath, wheezing and stridor.    Cardiovascular: Negative.  Negative for chest pain, orthopnea, leg swelling and PND.   Gastrointestinal:  Positive for abdominal pain. Negative for blood in stool and heartburn.   Genitourinary: Negative.  Negative for dysuria, flank pain and frequency.   Musculoskeletal: Negative.  Negative for back pain, falls and myalgias.   Skin: Negative.  Negative for itching.   Neurological:  Positive for dizziness, loss of consciousness and weakness. Negative for tingling, sensory change, focal weakness and seizures.   Endo/Heme/Allergies: Negative.  Negative for polydipsia. Does not bruise/bleed easily.   Psychiatric/Behavioral: Negative.  Negative for depression, substance abuse and suicidal ideas. The patient does not have insomnia.    All other systems reviewed and are negative.      Past Medical History   has a past medical history of Acquired hypothyroidism (05/04/2020), CAD (coronary artery disease), Chronic diastolic heart failure (HCC) (05/04/2020), Coronary artery disease due to lipid rich plaque, Dental disorder, Diabetes ([facility]), ESRD (end stage renal disease) on dialysis ([facility]) (05/04/2020), Hemodialysis patient ([facility]), Hyperlipidemia, Hypertension, Kidney transplant candidate, Kidney transplant recipient (10/31/2022), Presence of drug-eluting stent in right coronary artery, QT prolongation (01/22/2020), RLS (restless legs syndrome) (08/05/2016), and Transaminitis (12/22/2018).    Surgical History   has a past surgical history that includes recovery (08/16/2016); other abdominal surgery; other (Left, 2014); zzz cardiac cath (08/16/2016); zzz cardiac cath (09/07/2016); and other abdominal surgery (Right, 10/31/2022).     Family History  family  history includes Diabetes in her brother and sister; Other in her sister.   Family history reviewed with patient. There is family history that is pertinent to the chief complaint.     Social History   reports that she has never smoked. She has never used smokeless tobacco. She reports that she does not drink alcohol and does not use drugs.    Allergies  No Known Allergies    Medications  Cannot display prior to admission medications because the patient has not been admitted in this contact.       Physical Exam  Temp:  [36.4 °C (97.6 °F)] 36.4 °C (97.6 °F)  Pulse:  [54-57] 57  Resp:  [16-18] 16  BP: (137-140)/(50-62) 140/50  SpO2:  [97 %] 97 %                          Physical Exam  Vitals and nursing note reviewed. Exam conducted with a chaperone present.   Constitutional:       General: She is awake.      Appearance: Normal appearance. She is well-developed, well-groomed and normal weight.   HENT:      Head: Normocephalic and atraumatic.      Jaw: There is normal jaw occlusion. No trismus.      Salivary Glands: Right salivary gland is not tender. Left salivary gland is not tender.      Right Ear: External ear normal.      Left Ear: External ear normal.      Mouth/Throat:      Mouth: Mucous membranes are moist.      Pharynx: Oropharynx is clear.   Eyes:      General: Lids are normal. Vision grossly intact.      Extraocular Movements: Extraocular movements intact.      Conjunctiva/sclera: Conjunctivae normal.      Right eye: Right conjunctiva is not injected. No exudate.     Left eye: Left conjunctiva is not injected. No exudate.     Pupils: Pupils are equal, round, and reactive to light.   Neck:      Thyroid: No thyroid mass.      Vascular: No hepatojugular reflux or JVD.      Trachea: No abnormal tracheal secretions or tracheal deviation.   Cardiovascular:      Rate and Rhythm: Normal rate. Rhythm irregular. Occasional Extrasystoles are present.     Pulses: Normal pulses.      Heart sounds: Normal heart sounds. No  murmur heard.     No friction rub.   Pulmonary:      Effort: Pulmonary effort is normal.      Breath sounds: Examination of the right-lower field reveals decreased breath sounds. Examination of the left-lower field reveals decreased breath sounds. Decreased breath sounds present. No wheezing or rhonchi.   Abdominal:      General: Abdomen is flat.      Palpations: Abdomen is soft.      Tenderness: There is abdominal tenderness in the right lower quadrant. There is no right CVA tenderness or left CVA tenderness.      Hernia: No hernia is present.   Musculoskeletal:         General: Normal range of motion.      Cervical back: Full passive range of motion without pain, normal range of motion and neck supple. No rigidity. No muscular tenderness.      Right lower leg: No edema.      Left lower leg: No edema.   Lymphadenopathy:      Head:      Right side of head: No submental adenopathy.      Left side of head: No submental adenopathy.      Cervical:      Right cervical: No superficial cervical adenopathy.     Left cervical: No superficial cervical adenopathy.      Upper Body:      Right upper body: No supraclavicular adenopathy.      Left upper body: No supraclavicular adenopathy.   Skin:     General: Skin is warm and dry.      Capillary Refill: Capillary refill takes less than 2 seconds.      Coloration: Skin is not cyanotic or pale.      Findings: No abrasion or bruising.   Neurological:      General: No focal deficit present.      Mental Status: She is alert and oriented to person, place, and time. Mental status is at baseline.      GCS: GCS eye subscore is 4. GCS verbal subscore is 5. GCS motor subscore is 6.      Cranial Nerves: No cranial nerve deficit.      Sensory: No sensory deficit.      Motor: Motor function is intact.      Deep Tendon Reflexes:      Reflex Scores:       Tricep reflexes are 2+ on the right side and 2+ on the left side.       Bicep reflexes are 2+ on the right side and 2+ on the left side.        Brachioradialis reflexes are 2+ on the right side and 2+ on the left side.       Patellar reflexes are 2+ on the right side and 2+ on the left side.       Achilles reflexes are 2+ on the right side and 2+ on the left side.  Psychiatric:         Attention and Perception: Attention and perception normal.         Mood and Affect: Mood normal.         Speech: Speech normal.         Behavior: Behavior is cooperative.         Thought Content: Thought content normal.         Cognition and Memory: Cognition and memory normal.         Judgment: Judgment normal.         Laboratory:  Recent Labs     05/15/23  0707 05/17/23  1255   WBC 3.5* 4.8   RBC 3.89* 3.86*   HEMOGLOBIN 10.8* 10.8*   HEMATOCRIT 34.8* 34.5*   MCV 89.5 89.4   MCH 27.8 28.0   MCHC 31.0* 31.3*   RDW 46.8 47.3   PLATELETCT 96* 126*   MPV 11.0 12.5     Recent Labs     05/15/23  0707 05/17/23  1255   SODIUM 138 142   POTASSIUM 3.8 4.3   CHLORIDE 102 107   CO2 23 23   GLUCOSE 108* 151*   BUN 51* 53*   CREATININE 2.44* 2.10*   CALCIUM 10.1 10.1     Recent Labs     05/15/23  0707 05/17/23  1255   ALTSGPT  --  29   ASTSGOT  --  22   ALKPHOSPHAT  --  90   TBILIRUBIN  --  0.5   GLUCOSE 108* 151*         Recent Labs     05/17/23  1255   NTPROBNP 1104*         No results for input(s): TROPONINT in the last 72 hours.    Imaging:  X-Ray:  I have personally reviewed the images and compared with prior images.  EKG:  I have personally reviewed the images and compared with prior images.  Ultrasound: I personally reviewed the results of the ultrasound, looked at the images and discussed them with urology.    Assessment/Plan:  Justification for Admission Status  I anticipate this patient will require at least two midnights for appropriate medical management, necessitating inpatient admission because hydronephrosis in the transplant kidney and this will require at least 48 hours of management due to the fact that patient is on blood thinners and will need to be held off of the  blood thinners while the patient needs an interventional radiology procedure to be performed    Patient will need a Med/Surg bed on NEPHROLOGY service .  The need is secondary to hydronephrosis of the transplant kidney.  * Acute renal failure superimposed on chronic kidney disease, unspecified CKD stage, unspecified acute renal failure type (HCC)- (present on admission)  Assessment & Plan  Patient has been progressively getting worse since her kidney transplant which was done in November 2022 in [place].  She tells me that at home she was falling, she is very weak and tired, she has lost her appetite, she has headaches, nausea vomiting, and right lower quadrant abdominal pain.  The patient saw her nephrologist today in the office.  Since her kidney functions are much worse according to the nephrologist she was sent in to [facility] emergency room to be evaluated.  Patient herself says today she has no symptoms.  But ever since November she has been miserable.  The patient was evaluated with a urgent ultrasound of her kidneys in the emergency room.  Her native kidneys at this point are both atrophied and not working.  The transplant kidney at this point is affected by a hydronephrosis.  I have spoken with nephrology and they agree that we should not at this point give her excessive amounts of fluids and that urology should be involved.  I spoken with Dr. Rhodes of urology who at this point recommends transfer to [facility] for lower placing a percutaneous stent right into the collecting system.     Other hydronephrosis  Assessment & Plan  After discussion with urology and urology does not feel that they will be able to do a stent and they feel that interventional radiology will be able to put a percutaneous stent in place.     ESRD s/p kidney transplant 10/31/22- (present on admission)  Assessment & Plan  Follows with nephrology, Dr. Padilla is sent her into the hospital given her declining renal  functions  Spoke with Dr. Evans of nephrology who will consult  Patient does have a transplant kidney that at this point has decreased in his function due to severe hydronephrosis.      Paroxysmal A-fib (HCC)- (present on admission)  Assessment & Plan  Continue rate control and anticoagulation with Eliquis     Chronic heart failure with preserved ejection fraction (HCC)- (present on admission)  Assessment & Plan  Avoid excessive fluid overload as the patient has had peripheral swelling as well as shortness of breath in the past  Monitor BMP levels  If necessary obtain an echocardiogram  Currently patient is not fluid overloaded and does not have heart failure.     Coronary artery disease with angina pectoris with documented spasm (HCC)- (present on admission)  Assessment & Plan  History of 2 stents in the RCA  Continue medical management     Renovascular hypertension- (present on admission)  Assessment & Plan  Severe uncontrolled hypertension  Continue Norvasc 10 mg daily, losartan 100 mg daily, Toprol-XL 25 mg daily, minoxidil 2.5 mg daily  As needed labetalol     Anemia due to stage 3b chronic kidney disease (HCC)- (present on admission)  Assessment & Plan  Monitor H&H address was 7 or 21 transfuse     GERD (gastroesophageal reflux disease)- (present on admission)  Assessment & Plan  Continue PPI therapy currently has no complaints of heartburn  Monitor for need of chronic therapy     Acquired hypothyroidism- (present on admission)  Assessment & Plan  Synthroid 75 mcg daily  Most recent TSH 8.1801-month ago  Patient will need increase in her Synthroid supplementation     Mixed hyperlipidemia- (present on admission)  Assessment & Plan  Low-fat low-cholesterol diet  Lipitor 20 mg nightly  Fasting lipid panel     RLS (restless legs syndrome)- (present on admission)  Assessment & Plan  Currently not on medications for it.        VTE prophylaxis: SCDs/TEDs

## 2023-05-17 NOTE — ASSESSMENT & PLAN NOTE
Synthroid 75 mcg daily  Most recent TSH 8.1801-month ago  Patient will need increase in her Synthroid supplementation

## 2023-05-17 NOTE — CONSULTS
"[facility] -  CONSULTATION NOTE               Author: Favian Evans M.D. Date & Time: 5/17/2023  4:02 PM       REASON FOR CONSULTATION:   SASHA    CHIEF COMPLAINT:   \"SASHA\"    HISTORY OF PRESENT ILLNESS:     was used.  Patient is a 72 year old female with a PMHx of DM, HTN, afib, HLD, CAD, hypothyroidism, CHF, ESRD s/p donor kidney transplant in October 2022 [facility].  She's been on Cellcept, tacrolimus, and prednisone, although she doesn't really know the name of her medications.  She states she's been urinating well but urinating more frequently.  She has been having multiple falls at home with generalized weakness, loss of appetite and n/v with RLQ pain.  She had an allograft ultrasound showing hydronephrosis and imaging was reviewed with Dr Rhodes from urology who thinks patient should have perc drain placement.  Had been on Eliquis so has to wait 48 hours.  She will be transferred to [facility].  Nephrology consulted to follow      REVIEW OF SYSTEMS:    10 point ROS was performed and is as per HPI or otherwise negative    PAST MEDICAL HISTORY:   Past Medical History:   Diagnosis Date    Acquired hypothyroidism 05/04/2020    CAD (coronary artery disease)     Chronic diastolic heart failure (HCC) 05/04/2020    Coronary artery disease due to lipid rich plaque     2 Synergy NOEMI to 100% RCA stent placed    Dental disorder     partial dentures- uppers    Diabetes ([facility])     oral medication    ESRD (end stage renal disease) on dialysis ([facility]) 05/04/2020    Hemodialysis patient ([facility])     M, W, F    Hyperlipidemia     Hypertension     Kidney transplant candidate     Kidney transplant recipient 10/31/2022    Presence of drug-eluting stent in right coronary artery     QT prolongation 01/22/2020    RLS (restless legs syndrome) 08/05/2016    Transaminitis 12/22/2018       PAST SURGICAL HISTORY:   Past Surgical History:   Procedure Laterality Date    OTHER ABDOMINAL SURGERY Right " "10/31/2022     Donor kidney transplant - [facility]    ZZZ CARDIAC CATH  2016    RCA stented with 2 Synergy drug-eluting stents.    RECOVERY  2016    Procedure: CATH LAB [facility] WITH POSSIBLE DR. CASTILLO;  Surgeon: [facility];  Location: SURGERY PRE-POST PROC UNIT [facility];  Service:     ZZZ CARDIAC CATH  2016    100% RCA    OTHER Left 2014    left arm upper extremity fistula    OTHER ABDOMINAL SURGERY      left kidney removed due to cancer       FAMILY HISTORY:   Family History   Problem Relation Age of Onset    Diabetes Sister     Other Sister         liver disease    Diabetes Brother     Heart Disease Neg Hx        SOCIAL HISTORY:   Social History     Tobacco Use   Smoking Status Never   Smokeless Tobacco Never   Vaping Use    Vaping Use: Never used     Social History     Substance and Sexual Activity   Alcohol Use No    Alcohol/week: 0.0 oz     Social History     Substance and Sexual Activity   Drug Use No       HOME MEDICATIONS:   Reviewed and documented in chart    LABORATORY STUDIES:   Recent Labs     05/15/23  0707 23  1255   SODIUM 138 142   POTASSIUM 3.8 4.3   CHLORIDE 102 107   CO2 23 23   GLUCOSE 108* 151*   BUN 51* 53*   CREATININE 2.44* 2.10*   CALCIUM 10.1 10.1       ALLERGIES:  Patient has no known allergies.    VS:  BP (!) 140/50   Pulse (!) 57   Temp 36.4 °C (97.6 °F) (Temporal)   Resp 16   Ht 1.626 m (5' 4\")   Wt 56.7 kg (125 lb)   LMP  (LMP Unknown)   SpO2 97%   BMI 21.46 kg/m²     Physical Exam  HENT:      Head: Normocephalic.      Right Ear: External ear normal.      Left Ear: External ear normal.      Nose: Nose normal.      Mouth/Throat:      Mouth: Mucous membranes are moist.   Eyes:      General: No scleral icterus.  Cardiovascular:      Rate and Rhythm: Normal rate.   Pulmonary:      Effort: Pulmonary effort is normal.   Abdominal:      Palpations: Abdomen is soft.   Musculoskeletal:         General: No swelling.   Skin:     " General: Skin is warm.   Neurological:      General: No focal deficit present.      Mental Status: She is alert.   Psychiatric:         Mood and Affect: Mood normal.         FLUID BALANCE:  No intake/output data recorded.    IMAGING:  All imaging reviewed from admission to present day    IMPRESSION:  # SASHA with Hx renal transplant    - History of renal transplant at [facility] in Oct 2022    - Appears baseline cr fairly labile from 1.3-2.3 today 2.1    -  Imaging suggestive of obstructive uropathy, note that transplanted kidneys can sometimes have some evidence of hydro but urology thinks there is obstruction and recommends perc neph tube  # Hydronephrosis    - Awaiting eliquis  # Paroxysmal afib  # CHF with preserved ejection fraction  # GERD  # Hypothyroidism  # HLD  # Anemia    PLAN:  - No compelling indication for RRT  - Continue home immunosuppression medications  - Transfer to [facility] for perc tube placement pending eliquis washout period  - Will need tacrolimus level in the AM 1 hour before morning dose  - May need allograft renal biopsy pending relief of obstruction  - Daily evaluation for RRT needs  - Dose all meds per eGFR     Thank you for the consultation!

## 2023-05-17 NOTE — ASSESSMENT & PLAN NOTE
Continue PPI therapy currently has no complaints of heartburn  Monitor for need of chronic therapy

## 2023-05-17 NOTE — H&P
Hospital Medicine History & Physical Note    Date of Service  5/17/2023    Primary Care Physician  MALGORZATA Concepcion.    Consultants  nephrology and urology    Specialist Names: Dr. Evans of nephrology, Dr. Rhodes of urology.    Code Status  Full Code    Chief Complaint  Chief Complaint   Patient presents with    Abnormal Labs     Pt. With kidney transplant in October, seen at nephrologist and told to come to ED for abnormal kidney function labs at office. Pt. Is on tacrolimus. Denies any complains at this time. Denies fevers. I pad  [id number] [place] used to speak with patient.        History of Presenting Illness  Tere Gallegos is a 72 y.o. female who presented 5/17/2023 with worsening laboratory findings as an outpatient.  Patient was sent into the hospital by Dr Barraza of nephrology.  Patient had a transplant kidney in November 2022.  Her native kidneys are not functioning at all.  As of late her renal functions have declined and thus prompting a complete work-up and evaluation.  The patient at this point has been evaluated with an ultrasound the kidneys which at this point shows hydronephrosis of the transplant kidney.  I discussed the case with urology and they agree that the patient's hydronephrosis is severe enough that he was shocked on the transplant kidney.  Patient will need at this point interventional radiology to place a stent into the collecting system to bypass the occlusion.  The patient otherwise will need at this point strict fluid management given her history of heart failure.  Patient will also need excellent blood pressure management as the patient has severe uncontrolled blood pressure without her medications.    I discussed the plan of care with patient, bedside RN, and emergency room physician, nephrology, urology .    Review of Systems  Review of Systems   Constitutional: Negative.  Negative for chills, diaphoresis and fever.   HENT: Negative.  Negative for  nosebleeds and sinus pain.    Eyes: Negative.  Negative for double vision, photophobia, discharge and redness.   Respiratory: Negative.  Negative for cough, sputum production, shortness of breath, wheezing and stridor.    Cardiovascular: Negative.  Negative for chest pain, orthopnea, leg swelling and PND.   Gastrointestinal: Negative.  Negative for abdominal pain, blood in stool and heartburn.   Genitourinary: Negative.  Negative for dysuria, flank pain and frequency.   Musculoskeletal: Negative.  Negative for back pain, falls and myalgias.   Skin: Negative.  Negative for itching.   Neurological: Negative.  Negative for dizziness, tingling, sensory change, focal weakness and seizures.   Endo/Heme/Allergies: Negative.  Negative for polydipsia. Does not bruise/bleed easily.   Psychiatric/Behavioral: Negative.  Negative for depression, substance abuse and suicidal ideas. The patient does not have insomnia.    All other systems reviewed and are negative.      Past Medical History   has a past medical history of Acquired hypothyroidism (05/04/2020), CAD (coronary artery disease), Chronic diastolic heart failure (HCC) (05/04/2020), Coronary artery disease due to lipid rich plaque, Dental disorder, Diabetes ([facility]), ESRD (end stage renal disease) on dialysis ([facility]) (05/04/2020), Hemodialysis patient ([facility]), Hyperlipidemia, Hypertension, Kidney transplant candidate, Kidney transplant recipient (10/31/2022), Presence of drug-eluting stent in right coronary artery, QT prolongation (01/22/2020), RLS (restless legs syndrome) (08/05/2016), and Transaminitis (12/22/2018).    Surgical History   has a past surgical history that includes recovery (08/16/2016); other abdominal surgery; other (Left, 2014); zzz cardiac cath (08/16/2016); zzz cardiac cath (09/07/2016); and other abdominal surgery (Right, 10/31/2022).     Family History  family history includes Diabetes in her brother and sister; Other in her sister.   Family history  reviewed with patient. There is family history that is pertinent to the chief complaint.     Social History   reports that she has never smoked. She has never used smokeless tobacco. She reports that she does not drink alcohol and does not use drugs.    Allergies  No Known Allergies    Medications  Prior to Admission Medications   Prescriptions Last Dose Informant Patient Reported? Taking?   BD PEN NEEDLE PETE 2ND GEN supply Family Member Yes No   Sig: USE AS DIRECTED WITH INSULIN PENS THREE TIMES DAILY BEFORE A MEAL   Blood Glucose Monitoring Suppl (ONE TOUCH ULTRA 2) w/Device Kit supply Family Member No No   Si DEVICE 3 TIMES A DAY BEFORE MEALS.   Lancets supply Family Member No No   Sig: Use one Freestyle Pearl lancet to test blood sugar once daily .   amLODIPine (NORVASC) 10 MG Tab unk at unk Family Member Yes No   Sig: Take 10 mg by mouth every day.   apixaban (ELIQUIS) 5mg Tab unk at unk  Yes Yes   Sig: Take 5 mg by mouth 2 times a day.   atorvastatin (LIPITOR) 20 MG Tab unk at unk Family Member No No   Sig: Take 1 Tablet by mouth every evening.   furosemide (LASIX) 40 MG Tab unk at unk  No No   Sig: Take 1 Tablet by mouth 2 times a day.   glucose blood (ONETOUCH VERIO) strip  Family Member No No   Si Strip by Other route as needed (on insulin checking 3-4 times a day).   insulin aspart (NOVOLOG FLEXPEN) 100 UNIT/ML injection PEN  Family Member Yes No   Sig: Inject 2-8 Units under the skin 3 times a day before meals. Sliding Scale   insulin glargine (LANTUS SOLOSTAR) 100 UNIT/ML Solution Pen-injector injection  Family Member Yes No   Sig: Inject 5 Units under the skin every day.   levothyroxine (SYNTHROID) 75 MCG Tab unk at unk Family Member No No   Sig: Take 1 Tablet by mouth every morning on an empty stomach.   losartan (COZAAR) 100 MG Tab unk at unk Family Member No No   Sig: Take 1 Tablet by mouth every evening.   metoprolol SR (TOPROL XL) 25 MG TABLET SR 24 HR unk at unk Family Member Yes No    Sig: Take 25 mg by mouth every day.   minoxidil (LONITEN) 2.5 MG Tab unk at unk  No No   Sig: Take 1 Tablet by mouth every day.   mycophenolate (CELLCEPT) 250 MG Cap unk at unk  No No   Sig: Take 1 Capsule by mouth 2 times a day.   predniSONE (DELTASONE) 5 MG Tab unk at unk Family Member Yes No   Sig: Take 10 mg by mouth every day.   sodium bicarbonate (SODIUM BICARBONATE) 650 MG Tab unk at unk  No No   Sig: Take 2 Tablets by mouth in the morning, at noon, and at bedtime.   tacrolimus (PROGRAF) 1 MG Cap unk at unk  Yes Yes   Sig: Take 5 mg by mouth 2 times a day.   thiamine (THIAMINE) 100 MG tablet unk at unk  No No   Sig: Take 1 Tablet by mouth every day.      Facility-Administered Medications: None       Physical Exam  Temp:  [36.4 °C (97.6 °F)] 36.4 °C (97.6 °F)  Pulse:  [54] 54  Resp:  [18] 18  BP: (137)/(62) 137/62  SpO2:  [97 %] 97 %  Blood Pressure : 137/62   Temperature: 36.4 °C (97.6 °F)   Pulse: (!) 54   Respiration: 18   Pulse Oximetry: 97 %       Physical Exam  Constitutional:       Appearance: Normal appearance. She is well-developed and underweight.   HENT:      Head: Normocephalic and atraumatic.      Right Ear: Tympanic membrane, ear canal and external ear normal.      Left Ear: Tympanic membrane, ear canal and external ear normal.      Nose: Nose normal. No congestion or rhinorrhea.      Mouth/Throat:      Mouth: Mucous membranes are moist.      Pharynx: Oropharynx is clear.   Eyes:      Extraocular Movements: Extraocular movements intact.      Conjunctiva/sclera: Conjunctivae normal.      Pupils: Pupils are equal, round, and reactive to light.   Neck:      Thyroid: No thyroid mass.      Vascular: No carotid bruit or JVD.   Cardiovascular:      Rate and Rhythm: Normal rate and regular rhythm.      Pulses: Normal pulses.      Heart sounds: Normal heart sounds, S1 normal and S2 normal.   Pulmonary:      Effort: Pulmonary effort is normal.      Breath sounds: Normal breath sounds and air entry.    Abdominal:      General: Abdomen is flat. Bowel sounds are normal.      Palpations: Abdomen is soft.      Tenderness: There is abdominal tenderness in the right lower quadrant.   Musculoskeletal:         General: Normal range of motion.      Cervical back: Normal range of motion and neck supple. No edema or rigidity.      Right lower leg: No edema.      Left lower leg: No edema.      Right foot: Normal range of motion. No deformity or foot drop.      Left foot: Normal range of motion. No deformity or foot drop.   Feet:      Right foot:      Skin integrity: Skin integrity normal. No ulcer.      Left foot:      Skin integrity: Skin integrity normal. No ulcer.   Lymphadenopathy:      Head:      Right side of head: No submental adenopathy.      Left side of head: No submental adenopathy.      Cervical: No cervical adenopathy.      Right cervical: No superficial cervical adenopathy.     Left cervical: No superficial cervical adenopathy.      Upper Body:      Right upper body: No supraclavicular adenopathy.      Left upper body: No supraclavicular adenopathy.      Lower Body: No right inguinal adenopathy. No left inguinal adenopathy.   Skin:     General: Skin is warm and dry.      Capillary Refill: Capillary refill takes less than 2 seconds.      Findings: No abrasion or wound.   Neurological:      General: No focal deficit present.      Mental Status: She is alert and oriented to person, place, and time. Mental status is at baseline.      GCS: GCS eye subscore is 4. GCS verbal subscore is 5. GCS motor subscore is 6.      Sensory: Sensation is intact.      Motor: Motor function is intact. No weakness.      Coordination: Coordination is intact.   Psychiatric:         Mood and Affect: Mood and affect normal.         Speech: Speech normal.         Behavior: Behavior normal. Behavior is cooperative.         Thought Content: Thought content normal.         Judgment: Judgment normal.         Laboratory:  Recent Labs      05/15/23  0707 05/17/23  1255   WBC 3.5* 4.8   RBC 3.89* 3.86*   HEMOGLOBIN 10.8* 10.8*   HEMATOCRIT 34.8* 34.5*   MCV 89.5 89.4   MCH 27.8 28.0   MCHC 31.0* 31.3*   RDW 46.8 47.3   PLATELETCT 96* 126*   MPV 11.0 12.5     Recent Labs     05/15/23  0707 05/17/23  1255   SODIUM 138 142   POTASSIUM 3.8 4.3   CHLORIDE 102 107   CO2 23 23   GLUCOSE 108* 151*   BUN 51* 53*   CREATININE 2.44* 2.10*   CALCIUM 10.1 10.1     Recent Labs     05/15/23  0707 05/17/23  1255   ALTSGPT  --  29   ASTSGOT  --  22   ALKPHOSPHAT  --  90   TBILIRUBIN  --  0.5   GLUCOSE 108* 151*         Recent Labs     05/17/23  1255   NTPROBNP 1104*         No results for input(s): TROPONINT in the last 72 hours.    Imaging:  US-RENAL TRANSPLANT COMP   Final Result      1.  Moderate hydronephrosis of a right lower quadrant renal transplant.      2.  Abnormal elevated resistive indices of the renal transplant which can be seen in the setting of graft rejection/failure.          X-Ray:  I have personally reviewed the images and compared with prior images.  EKG:  I have personally reviewed the images and compared with prior images.  Renal ultrasound which shows right-sided transplant kidney hydronephrosis    Assessment/Plan:  Justification for Admission Status  I anticipate this patient will require at least two midnights for appropriate medical management, necessitating inpatient admission because patient has acute on chronic renal failure with acute hydronephrosis of transplanted kidney.  The patient will need reversal of anticoagulation and then will need interventional radiology procedure.  This will require at least 48 hours of inpatient management.    Patient will need a Med/Surg bed on MEDICAL service .  The need is secondary to acute on chronic renal failure.    * Acute renal failure superimposed on chronic kidney disease, unspecified CKD stage, unspecified acute renal failure type (HCC)- (present on admission)  Assessment & Plan  Patient has been  progressively getting worse since her kidney transplant which was done in November 2022 in [place].  She tells me that at home she was falling, she is very weak and tired, she has lost her appetite, she has headaches, nausea vomiting, and right lower quadrant abdominal pain.  The patient saw her nephrologist today in the office.  Since her kidney functions are much worse according to the nephrologist she was sent in to be [facility] emergency room to be evaluated.  Patient herself says today she has no symptoms.  But ever since November she has been miserable.  The patient was evaluated with a urgent ultrasound of her kidneys in the emergency room.  Her native kidneys at this point are both atrophied and not working.  The transplant kidney at this point is affected by a hydronephrosis.  I have spoken with nephrology and they agree that we should not at this point give her excessive amounts of fluids and that urology should be involved.  I spoken with Dr. Rhodes of urology who at this point recommends transfer to [facility] for lower placing a percutaneous stent right into the collecting system.    Other hydronephrosis  Assessment & Plan  After discussion with urology and urology does not feel that they will be able to do a stent and they feel that interventional radiology will be able to put a percutaneous stent in place.    ESRD s/p kidney transplant 10/31/22- (present on admission)  Assessment & Plan  Follows with nephrology, Dr. Padilla is sent her into the hospital given her declining renal functions  Spoke with Dr. Evans of nephrology who will consult  Patient does have a transplant kidney that at this point has decreased in his function due to severe hydronephrosis.     Paroxysmal A-fib (HCC)- (present on admission)  Assessment & Plan  Continue rate control and anticoagulation with Eliquis    Chronic heart failure with preserved ejection fraction (HCC)- (present on admission)  Assessment &  Plan  Avoid excessive fluid overload as the patient has had peripheral swelling as well as shortness of breath in the past  Monitor BMP levels  If necessary obtain an echocardiogram  Currently patient is not fluid overloaded and does not have heart failure.    Coronary artery disease with angina pectoris with documented spasm (HCC)- (present on admission)  Assessment & Plan  History of 2 stents in the RCA  Continue medical management    Renovascular hypertension- (present on admission)  Assessment & Plan  Severe uncontrolled hypertension  Continue Norvasc 10 mg daily, losartan 100 mg daily, Toprol-XL 25 mg daily, minoxidil 2.5 mg daily  As needed labetalol    Anemia due to stage 3b chronic kidney disease (HCC)- (present on admission)  Assessment & Plan  Monitor H&H address was 7 or 21 transfuse    GERD (gastroesophageal reflux disease)- (present on admission)  Assessment & Plan  Continue PPI therapy currently has no complaints of heartburn  Monitor for need of chronic therapy    Acquired hypothyroidism- (present on admission)  Assessment & Plan  Synthroid 75 mcg daily  Most recent TSH 8.1801-month ago  Patient will need increase in her Synthroid supplementation    Mixed hyperlipidemia- (present on admission)  Assessment & Plan  Low-fat low-cholesterol diet  Lipitor 20 mg nightly  Fasting lipid panel    RLS (restless legs syndrome)- (present on admission)  Assessment & Plan  Currently not on medications for it.        VTE prophylaxis: therapeutic anticoagulation with Eliquis

## 2023-05-17 NOTE — ASSESSMENT & PLAN NOTE
After discussion with urology and urology does not feel that they will be able to do a stent and they feel that interventional radiology will be able to put a percutaneous stent in place.

## 2023-05-17 NOTE — ED NOTES
Med rec complete per pt with  IPAD at bedside and medications.   Multiple medications in her bag. I went through these with Pt.   Allergies reviewed.   RP updated per Insulin usage. Pt takes Novolog if BS>200=4 units no matter how high.   Per pt she tests at 12 noon and 3 PM and takes Novolog then if needed. Per Pt she takes Glargine 5 units at 09:30am.

## 2023-05-17 NOTE — PROGRESS NOTES
"Called by Dr. Garza for direct admission from [facility] for:    \"Tere Gallegos is a 72 y.o. female who presented 5/17/2023 with worsening laboratory findings as an outpatient.  Patient was sent into the hospital by Dr Barraza of nephrology.  Patient had a transplant kidney in November 2022.  Her native kidneys are not functioning at all.  As of late her renal functions have declined and thus prompting a complete work-up and evaluation.  The patient at this point has been evaluated with an ultrasound the kidneys which at this point shows hydronephrosis of the transplant kidney.  I discussed the case with urology and they agree that the patient's hydronephrosis is severe enough that he was shocked on the transplant kidney.  Patient will need at this point interventional radiology to place a stent into the collecting system to bypass the occlusion.  The patient otherwise will need at this point strict fluid management given her history of heart failure.  Patient will also need excellent blood pressure management as the patient has severe uncontrolled blood pressure without her medications.\"    Dr. Garza arranging transfer and discussing with specialists. Please contact triage coordinator when patient arrives to have hospitalist assigned.       "

## 2023-05-17 NOTE — ED PROVIDER NOTES
"ED Provider Note    CHIEF COMPLAINT  Chief Complaint   Patient presents with    Abnormal Labs     Pt. With kidney transplant in October, seen at nephrologist and told to come to ED for abnormal kidney function labs at office. Pt. Is on tacrolimus. Denies any complains at this time. Denies fevers. I pad  [id number] [place] used to speak with patient.        EXTERNAL RECORDS REVIEWED  Inpatient Notes she is status post cadaveric transplant.    HPI/ROS  LIMITATION TO HISTORY   Select: : None  OUTSIDE HISTORIAN(S):  She arrives with a note from Yulissa Mcleod stating the patient has had worsening creatinine for the last 2 to 3 months and he would like her to be admitted and evaluated.    Tere Gallegos is a 72 y.o. female who presents here at the request of her doctor who saw her today and was concerned about her increasing creatinine.  The patient for her part feels fine.  She denies any headache, chest pain or shortness of breath.  No nausea or vomiting.  She reports no abdominal pain.  No change in urination.  She states \"I feel fine.\"      Further history subsequently attained by the hospitalist however, the patient has been feeling rather weak and out of sorts of late.    PAST MEDICAL HISTORY   has a past medical history of Acquired hypothyroidism (05/04/2020), CAD (coronary artery disease), Chronic diastolic heart failure (HCC) (05/04/2020), Coronary artery disease due to lipid rich plaque, Dental disorder, Diabetes ([facility]), ESRD (end stage renal disease) on dialysis ([facility]) (05/04/2020), Hemodialysis patient ([facility]), Hyperlipidemia, Hypertension, Kidney transplant candidate, Kidney transplant recipient (10/31/2022), Presence of drug-eluting stent in right coronary artery, QT prolongation (01/22/2020), RLS (restless legs syndrome) (08/05/2016), and Transaminitis (12/22/2018).    SURGICAL HISTORY   has a past surgical history that includes recovery (08/16/2016); other abdominal surgery; other (Left, 2014); zzz " "cardiac cath (08/16/2016); zzz cardiac cath (09/07/2016); and other abdominal surgery (Right, 10/31/2022).    FAMILY HISTORY  Family History   Problem Relation Age of Onset    Diabetes Sister     Other Sister         liver disease    Diabetes Brother     Heart Disease Neg Hx        SOCIAL HISTORY  Social History     Tobacco Use    Smoking status: Never    Smokeless tobacco: Never   Vaping Use    Vaping Use: Never used   Substance and Sexual Activity    Alcohol use: No     Alcohol/week: 0.0 oz    Drug use: No    Sexual activity: Never       CURRENT MEDICATIONS  Home Medications       Reviewed by Michelle Santiago R.N. (Registered Nurse) on 05/17/23 at 1143  Med List Status: Not Addressed     Medication Last Dose Status   amLODIPine (NORVASC) 10 MG Tab  Active   apixaban (ELIQUIS) 2.5mg Tab  Active   atorvastatin (LIPITOR) 20 MG Tab  Active   BD PEN NEEDLE PETE 2ND GEN  Active   Blood Glucose Monitoring Suppl (ONE TOUCH ULTRA 2) w/Device Kit  Active   furosemide (LASIX) 40 MG Tab  Active   glucose blood (ONETOUCH VERIO) strip  Active   insulin aspart (NOVOLOG FLEXPEN) 100 UNIT/ML injection PEN  Active   insulin glargine (LANTUS SOLOSTAR) 100 UNIT/ML Solution Pen-injector injection  Active   Lancets  Active   levothyroxine (SYNTHROID) 75 MCG Tab  Active   losartan (COZAAR) 100 MG Tab  Active   metoprolol SR (TOPROL XL) 25 MG TABLET SR 24 HR  Active   minoxidil (LONITEN) 2.5 MG Tab  Active   mycophenolate (CELLCEPT) 250 MG Cap  Active   predniSONE (DELTASONE) 5 MG Tab  Active   sodium bicarbonate (SODIUM BICARBONATE) 650 MG Tab  Active   tacrolimus (PROGRAF) 1 MG Cap  Active   thiamine (THIAMINE) 100 MG tablet  Active                    ALLERGIES  No Known Allergies    PHYSICAL EXAM  VITAL SIGNS: /62   Pulse (!) 54   Temp 36.4 °C (97.6 °F) (Temporal)   Resp 18   Ht 1.626 m (5' 4\")   Wt 56.7 kg (125 lb)   LMP  (LMP Unknown)   SpO2 97%   BMI 21.46 kg/m²    Constitutional: Well appearing patient in no acute " distress.  HENT: Head is without trauma.  Oropharynx is clear.  Mucous membranes are moist.  Eyes: Sclerae are nonicteric, pupils are equally round.  Neck: Supple with grossly normal range of motion.  Cardiovascular: Heart is regular rate and rhythm without murmur rub or gallop.  Peripheral pulses are intact and symmetric throughout.  Thorax & Lungs: Breathing easily.  Good air movement.  There is no wheeze, rhonchi or rales.  Abdomen: Bowel sounds normal, soft, non-distended, nontender.  I do not feel a mass.  Skin: No apparent rash.  I do not see petechiae or purpura.  Extremities: No evidence of acute trauma.  No clubbing, cyanosis, edema, no Homans or cords.  Neurologic: Alert. Moving all extremities.  Intact sensation and strength throughout.  Gait is normal.  Psychiatric: Normal for situation      DIAGNOSTIC STUDIES / PROCEDURES    LABS  Labs Reviewed   CBC WITH DIFFERENTIAL - Abnormal; Notable for the following components:       Result Value    RBC 3.86 (*)     Hemoglobin 10.8 (*)     Hematocrit 34.5 (*)     MCHC 31.3 (*)     Platelet Count 126 (*)     Neutrophils-Polys 85.70 (*)     Lymphocytes 5.20 (*)     Lymphs (Absolute) 0.25 (*)     All other components within normal limits   COMP METABOLIC PANEL - Abnormal; Notable for the following components:    Glucose 151 (*)     Bun 53 (*)     Creatinine 2.10 (*)     All other components within normal limits   PROBRAIN NATRIURETIC PEPTIDE, NT - Abnormal; Notable for the following components:    NT-proBNP 1104 (*)     All other components within normal limits   ESTIMATED GFR - Abnormal; Notable for the following components:    GFR (CKD-EPI) 24 (*)     All other components within normal limits   CORRECTED CALCIUM         RADIOLOGY  I have ordered a renal transplant ultrasound    COURSE & MEDICAL DECISION MAKING    ED Observation Status? No; Patient does not meet criteria for ED Observation.     INITIAL ASSESSMENT, COURSE AND PLAN  Care Narrative: Patient with a  history of cadaveric renal transplant with worsening creatinine over the last 2 to 3 months.  She arrives with a note from her nephrologist asking for admission and evaluation as well as some IV fluids.  I have ordered laboratories, as well as an ultrasound.  I have initiated some IV fluids.  I discussed with the patient that I will be putting her in the hospital for further work-up.  She and her son are in agreement with this.  I have called and spoke with Dr. Manzo, hospitalist, who has seen and evaluate the patient.  The history he obtains is more concerning than the one the patient told me, she has been having more protean symptoms such as weakness and feeling poorly.  It sounds that he will be involving nephrology and urology in her care.            DISPOSITION AND DISCUSSIONS  I have discussed management of the patient with the following physicians and BETTE's: Hospitalist      FINAL DIAGNOSIS  1. Acute renal insufficiency    2. Renal transplant recipient           Electronically signed by: Derrek Goldberg M.D., 5/17/2023 12:38 PM

## 2023-05-17 NOTE — ASSESSMENT & PLAN NOTE
Severe uncontrolled hypertension  Continue Norvasc 10 mg daily, losartan 100 mg daily, Toprol-XL 25 mg daily, minoxidil 2.5 mg daily  As needed labetalol

## 2023-05-17 NOTE — ASSESSMENT & PLAN NOTE
Follows with nephrology, Dr. Padilla is sent her into the hospital given her declining renal functions  Spoke with Dr. Evans of nephrology who will consult  Patient does have a transplant kidney that at this point has decreased in his function due to severe hydronephrosis.

## 2023-05-17 NOTE — DISCHARGE SUMMARY
Discharge Summary    CHIEF COMPLAINT ON ADMISSION  Chief Complaint   Patient presents with    Abnormal Labs     Pt. With kidney transplant in October, seen at nephrologist and told to come to ED for abnormal kidney function labs at office. Pt. Is on tacrolimus. Denies any complains at this time. Denies fevers. I pad  [id number] Fel[place] used to speak with patient.        Reason for Admission  Sent by [facility] Nephrology     Admission Date  5/17/2023    CODE STATUS  Full Code    HPI & HOSPITAL COURSE  Ms. Tere Kwon is a 72-year-old female comes into the hospital per request of her nephrologist Dr. Barraza.  The patient has diabetes and hypertension second to which she has no anatomical functioning kidneys of her own.  The patient has received a kidney transplant in November 2023 and [place].  The patient since then has been on prednisone CellCept and Prograf.  The patient tells me as of late at home she had multiple falls, generalized weakness, loss of appetite, nausea vomiting and right lower quadrant abdominal pain.  She relates the pain to postsurgical pain even though the surgery happened in November.  Upon further evaluation in the emergency room the patient was found to have hydronephrosis of the transplanted kidney.  And thus the patient was admitted to the medical floor at [facility].  The patient at this point needs interventional radiological percutaneous drain placement after discussion with Dr. Rhodes of urology.  I have spoken with Dr. Adebayo Laurent, he agrees that the patient at this point does need the procedure performed but since the patient is on Eliquis they will need to find a procedure date at least 48 hours out for the patient.  Patient's Eliquis for the time being will be held.  I will transfer the patient to [facility] for ongoing treatment and management.  I have consulted the appropriate nephrologist Dr. Arriaga and the, Dr. Rhodes of nephrology and   Mehran of interventional radiology.  Patient has been coordinated through the transfer center and patient will be going to [place] [id number] bed 2.    Therefore, she is discharged in guarded and stable condition to a critical access hospital.    Discharge Date  5/17/2023    FOLLOW UP ITEMS POST DISCHARGE  Follow-up with urology, nephrology and interventional radiology as appropriate    DISCHARGE DIAGNOSES  Principal Problem:    Acute renal failure superimposed on chronic kidney disease, unspecified CKD stage, unspecified acute renal failure type (HCC) (POA: Yes)  Active Problems:    ESRD s/p kidney transplant 10/31/22 (Chronic) (POA: Yes)      Overview: -Dialysis MWF      -follows with nephrology    Other hydronephrosis (POA: Unknown)    Renovascular hypertension (POA: Yes)    Coronary artery disease with angina pectoris with documented spasm (HCC) (POA: Yes)      Overview: 2 Synergy NOEMI to 100% RCA stent placed    Chronic heart failure with preserved ejection fraction (HCC) (POA: Yes)    Paroxysmal A-fib (HCC) (POA: Yes)    RLS (restless legs syndrome) (POA: Yes)    Mixed hyperlipidemia (POA: Yes)    Acquired hypothyroidism (POA: Yes)    GERD (gastroesophageal reflux disease) (POA: Yes)    Anemia due to stage 3b chronic kidney disease (HCC) (POA: Yes)  Resolved Problems:    * No resolved hospital problems. *      FOLLOW UP  Future Appointments   Date Time Provider Department Center   6/6/2023  3:00 PM Priyank Villar M.D. NEPH [facility] [address]   6/7/2023 11:10 AM ANGELITA Guzmán   6/8/2023  4:00 PM ANGELITA Concepcion   6/27/2023  4:00 PM ANGELITA Fisher CARCSHAYNA None   8/7/2023  4:45 PM V EXAM 4 VMED None     No follow-up provider specified.    MEDICATIONS ON DISCHARGE     Medication List        CONTINUE taking these medications        Instructions   BD Pen Needle Yasmeen 2nd Gen  Generic drug: Insulin Pen Needle 32 G x 4 mm   USE AS DIRECTED WITH INSULIN PENS THREE TIMES DAILY  BEFORE A MEAL     Lancets   Doctor's comments: Or per formulary preference. ICD-10 code: E11.9 Controlled type 2 Diabetes Mellitus with long term insulin use  Use one Freestyle Pearl lancet to test blood sugar once daily .     ONE TOUCH ULTRA 2 w/Device Kit   1 DEVICE 3 TIMES A DAY BEFORE MEALS.            ASK your doctor about these medications        Instructions   amLODIPine 10 MG Tabs  Commonly known as: NORVASC   Take 10 mg by mouth every day.  Dose: 10 mg     atorvastatin 20 MG Tabs  Commonly known as: LIPITOR   Take 1 Tablet by mouth every evening.  Dose: 20 mg     Eliquis 5mg Tabs  Generic drug: apixaban  Ask about: Which instructions should I use?   Take 5 mg by mouth 2 times a day.  Dose: 5 mg     furosemide 40 MG Tabs  Commonly known as: LASIX   Take 1 Tablet by mouth 2 times a day.  Dose: 40 mg     Lantus SoloStar 100 UNIT/ML Sopn injection  Generic drug: insulin glargine   Inject 5 Units under the skin every day.  Dose: 5 Units     levothyroxine 75 MCG Tabs  Commonly known as: SYNTHROID   Take 1 Tablet by mouth every morning on an empty stomach.  Dose: 75 mcg     losartan 100 MG Tabs  Commonly known as: COZAAR   Take 1 Tablet by mouth every evening.  Dose: 100 mg     metoprolol SR 25 MG Tb24  Commonly known as: TOPROL XL   Take 25 mg by mouth every day.  Dose: 25 mg     minoxidil 2.5 MG Tabs  Commonly known as: LONITEN   Take 1 Tablet by mouth every day.  Dose: 2.5 mg     mycophenolate 250 MG Caps  Commonly known as: CELLCEPT  Ask about: Which instructions should I use?   Take 1 Capsule by mouth 2 times a day.  Dose: 250 mg     NovoLOG FlexPen 100 UNIT/ML injection PEN  Generic drug: insulin aspart   Inject 4 Units under the skin 2 times a day. If BS>200=Pt takes 4 units of Insulin  Dose: 4 Units     OneTouch Verio strip  Generic drug: glucose blood   1 Strip by Other route as needed (on insulin checking 3-4 times a day).  Dose: 1 Each     predniSONE 5 MG Tabs  Commonly known as: DELTASONE   Take 10 mg  by mouth every day.  Dose: 10 mg     sodium bicarbonate 650 MG Tabs  Commonly known as: SODIUM BICARBONATE   Take 2 Tablets by mouth in the morning, at noon, and at bedtime.  Dose: 1,300 mg     tacrolimus 1 MG Caps  Commonly known as: PROGRAF  Ask about: Which instructions should I use?   Take 5 mg by mouth 2 times a day.  Dose: 5 mg     thiamine 100 MG tablet  Commonly known as: THIAMINE   Take 1 Tablet by mouth every day.  Dose: 100 mg              Allergies  No Known Allergies    DIET  No orders of the defined types were placed in this encounter.      ACTIVITY  As tolerated.  Weight bearing as tolerated    CONSULTATIONS  Neurology, nephrology    PROCEDURES  None    LABORATORY  Lab Results   Component Value Date    SODIUM 142 05/17/2023    POTASSIUM 4.3 05/17/2023    CHLORIDE 107 05/17/2023    CO2 23 05/17/2023    GLUCOSE 151 (H) 05/17/2023    BUN 53 (H) 05/17/2023    CREATININE 2.10 (H) 05/17/2023        Lab Results   Component Value Date    WBC 4.8 05/17/2023    HEMOGLOBIN 10.8 (L) 05/17/2023    HEMATOCRIT 34.5 (L) 05/17/2023    PLATELETCT 126 (L) 05/17/2023        Total time of the discharge process exceeds 65 minutes.

## 2023-05-18 ENCOUNTER — APPOINTMENT (OUTPATIENT)
Dept: RADIOLOGY | Facility: MEDICAL CENTER | Age: 73
DRG: 698 | End: 2023-05-18
Attending: HOSPITALIST
Payer: MEDICARE

## 2023-05-18 ENCOUNTER — APPOINTMENT (OUTPATIENT)
Dept: RADIOLOGY | Facility: MEDICAL CENTER | Age: 73
DRG: 698 | End: 2023-05-18
Attending: GENERAL PRACTICE
Payer: MEDICARE

## 2023-05-18 PROBLEM — I50.32 CHRONIC DIASTOLIC CHF (CONGESTIVE HEART FAILURE) (HCC): Status: ACTIVE | Noted: 2022-01-25

## 2023-05-18 LAB
ANION GAP SERPL CALC-SCNC: 12 MMOL/L (ref 7–16)
BUN SERPL-MCNC: 38 MG/DL (ref 8–22)
CALCIUM SERPL-MCNC: 9.3 MG/DL (ref 8.5–10.5)
CHLORIDE SERPL-SCNC: 115 MMOL/L (ref 96–112)
CO2 SERPL-SCNC: 16 MMOL/L (ref 20–33)
CREAT SERPL-MCNC: 1.61 MG/DL (ref 0.5–1.4)
ERYTHROCYTE [DISTWIDTH] IN BLOOD BY AUTOMATED COUNT: 49.5 FL (ref 35.9–50)
GFR SERPLBLD CREATININE-BSD FMLA CKD-EPI: 34 ML/MIN/1.73 M 2
GLUCOSE BLD STRIP.AUTO-MCNC: 149 MG/DL (ref 65–99)
GLUCOSE BLD STRIP.AUTO-MCNC: 232 MG/DL (ref 65–99)
GLUCOSE BLD STRIP.AUTO-MCNC: 234 MG/DL (ref 65–99)
GLUCOSE SERPL-MCNC: 73 MG/DL (ref 65–99)
GRAM STN SPEC: NORMAL
HCT VFR BLD AUTO: 34.1 % (ref 37–47)
HGB BLD-MCNC: 10.4 G/DL (ref 12–16)
INR PPP: 1.29 (ref 0.87–1.13)
MCH RBC QN AUTO: 28 PG (ref 27–33)
MCHC RBC AUTO-ENTMCNC: 30.5 G/DL (ref 33.6–35)
MCV RBC AUTO: 91.7 FL (ref 81.4–97.8)
PLATELET # BLD AUTO: 97 K/UL (ref 164–446)
POTASSIUM SERPL-SCNC: 4.5 MMOL/L (ref 3.6–5.5)
PROTHROMBIN TIME: 15.9 SEC (ref 12–14.6)
RBC # BLD AUTO: 3.72 M/UL (ref 4.2–5.4)
SIGNIFICANT IND 70042: NORMAL
SITE SITE: NORMAL
SODIUM SERPL-SCNC: 143 MMOL/L (ref 135–145)
SOURCE SOURCE: NORMAL
WBC # BLD AUTO: 2.7 K/UL (ref 4.8–10.8)

## 2023-05-18 PROCEDURE — 82962 GLUCOSE BLOOD TEST: CPT | Mod: 91

## 2023-05-18 PROCEDURE — 700102 HCHG RX REV CODE 250 W/ 637 OVERRIDE(OP): Performed by: HOSPITALIST

## 2023-05-18 PROCEDURE — 700111 HCHG RX REV CODE 636 W/ 250 OVERRIDE (IP): Performed by: INTERNAL MEDICINE

## 2023-05-18 PROCEDURE — 87077 CULTURE AEROBIC IDENTIFY: CPT

## 2023-05-18 PROCEDURE — 87086 URINE CULTURE/COLONY COUNT: CPT

## 2023-05-18 PROCEDURE — A9270 NON-COVERED ITEM OR SERVICE: HCPCS

## 2023-05-18 PROCEDURE — 36415 COLL VENOUS BLD VENIPUNCTURE: CPT

## 2023-05-18 PROCEDURE — 0T9330Z DRAINAGE OF RIGHT KIDNEY PELVIS WITH DRAINAGE DEVICE, PERCUTANEOUS APPROACH: ICD-10-PCS | Performed by: RADIOLOGY

## 2023-05-18 PROCEDURE — 87205 SMEAR GRAM STAIN: CPT

## 2023-05-18 PROCEDURE — 80048 BASIC METABOLIC PNL TOTAL CA: CPT

## 2023-05-18 PROCEDURE — 700111 HCHG RX REV CODE 636 W/ 250 OVERRIDE (IP)

## 2023-05-18 PROCEDURE — 87040 BLOOD CULTURE FOR BACTERIA: CPT

## 2023-05-18 PROCEDURE — C1729 CATH, DRAINAGE: HCPCS

## 2023-05-18 PROCEDURE — 700111 HCHG RX REV CODE 636 W/ 250 OVERRIDE (IP): Performed by: RADIOLOGY

## 2023-05-18 PROCEDURE — 99232 SBSQ HOSP IP/OBS MODERATE 35: CPT | Performed by: GENERAL PRACTICE

## 2023-05-18 PROCEDURE — 770006 HCHG ROOM/CARE - MED/SURG/GYN SEMI*

## 2023-05-18 PROCEDURE — 700111 HCHG RX REV CODE 636 W/ 250 OVERRIDE (IP): Performed by: HOSPITALIST

## 2023-05-18 PROCEDURE — 700117 HCHG RX CONTRAST REV CODE 255: Performed by: RADIOLOGY

## 2023-05-18 PROCEDURE — A9270 NON-COVERED ITEM OR SERVICE: HCPCS | Performed by: HOSPITALIST

## 2023-05-18 PROCEDURE — 87186 SC STD MICRODIL/AGAR DIL: CPT

## 2023-05-18 PROCEDURE — 700102 HCHG RX REV CODE 250 W/ 637 OVERRIDE(OP)

## 2023-05-18 PROCEDURE — 700105 HCHG RX REV CODE 258: Performed by: HOSPITALIST

## 2023-05-18 PROCEDURE — 85027 COMPLETE CBC AUTOMATED: CPT

## 2023-05-18 PROCEDURE — 700105 HCHG RX REV CODE 258: Performed by: RADIOLOGY

## 2023-05-18 PROCEDURE — 85610 PROTHROMBIN TIME: CPT

## 2023-05-18 RX ORDER — MIDAZOLAM HYDROCHLORIDE 1 MG/ML
INJECTION INTRAMUSCULAR; INTRAVENOUS
Status: COMPLETED
Start: 2023-05-18 | End: 2023-05-18

## 2023-05-18 RX ORDER — OXYCODONE HYDROCHLORIDE 10 MG/1
10 TABLET ORAL
Status: DISCONTINUED | OUTPATIENT
Start: 2023-05-18 | End: 2023-05-20 | Stop reason: HOSPADM

## 2023-05-18 RX ORDER — PREDNISONE 5 MG/1
5 TABLET ORAL DAILY
Status: DISCONTINUED | OUTPATIENT
Start: 2023-05-19 | End: 2023-05-20 | Stop reason: HOSPADM

## 2023-05-18 RX ORDER — CEFAZOLIN SODIUM 1 G/3ML
INJECTION, POWDER, FOR SOLUTION INTRAMUSCULAR; INTRAVENOUS
Status: DISPENSED
Start: 2023-05-18 | End: 2023-05-19

## 2023-05-18 RX ORDER — OXYCODONE HYDROCHLORIDE 5 MG/1
5 TABLET ORAL
Status: DISCONTINUED | OUTPATIENT
Start: 2023-05-18 | End: 2023-05-20 | Stop reason: HOSPADM

## 2023-05-18 RX ORDER — SODIUM CHLORIDE 9 MG/ML
500 INJECTION, SOLUTION INTRAVENOUS
Status: ACTIVE | OUTPATIENT
Start: 2023-05-18 | End: 2023-05-18

## 2023-05-18 RX ORDER — HYDROMORPHONE HYDROCHLORIDE 1 MG/ML
0.5 INJECTION, SOLUTION INTRAMUSCULAR; INTRAVENOUS; SUBCUTANEOUS
Status: DISCONTINUED | OUTPATIENT
Start: 2023-05-18 | End: 2023-05-20 | Stop reason: HOSPADM

## 2023-05-18 RX ORDER — ONDANSETRON 2 MG/ML
4 INJECTION INTRAMUSCULAR; INTRAVENOUS PRN
Status: ACTIVE | OUTPATIENT
Start: 2023-05-18 | End: 2023-05-18

## 2023-05-18 RX ORDER — MIDAZOLAM HYDROCHLORIDE 1 MG/ML
.5-2 INJECTION INTRAMUSCULAR; INTRAVENOUS PRN
Status: ACTIVE | OUTPATIENT
Start: 2023-05-18 | End: 2023-05-18

## 2023-05-18 RX ADMIN — AMLODIPINE BESYLATE 10 MG: 10 TABLET ORAL at 05:11

## 2023-05-18 RX ADMIN — SODIUM BICARBONATE 1300 MG: 650 TABLET ORAL at 18:14

## 2023-05-18 RX ADMIN — FENTANYL CITRATE 50 MCG: 50 INJECTION, SOLUTION INTRAMUSCULAR; INTRAVENOUS at 15:24

## 2023-05-18 RX ADMIN — SODIUM BICARBONATE 1300 MG: 650 TABLET ORAL at 05:12

## 2023-05-18 RX ADMIN — MYCOPHENOLATE MOFETIL 250 MG: 250 CAPSULE ORAL at 05:14

## 2023-05-18 RX ADMIN — OXYCODONE HYDROCHLORIDE 10 MG: 10 TABLET ORAL at 21:50

## 2023-05-18 RX ADMIN — Medication 100 MG: at 05:10

## 2023-05-18 RX ADMIN — CEFTRIAXONE SODIUM 2000 MG: 10 INJECTION, POWDER, FOR SOLUTION INTRAVENOUS at 02:00

## 2023-05-18 RX ADMIN — SENNOSIDES AND DOCUSATE SODIUM 2 TABLET: 50; 8.6 TABLET ORAL at 18:14

## 2023-05-18 RX ADMIN — IOHEXOL 20 ML: 300 INJECTION, SOLUTION INTRAVENOUS at 16:15

## 2023-05-18 RX ADMIN — FUROSEMIDE 40 MG: 40 TABLET ORAL at 05:11

## 2023-05-18 RX ADMIN — METOPROLOL SUCCINATE 25 MG: 25 TABLET, EXTENDED RELEASE ORAL at 05:11

## 2023-05-18 RX ADMIN — MIDAZOLAM 1 MG: 1 INJECTION, SOLUTION INTRAMUSCULAR; INTRAVENOUS at 15:30

## 2023-05-18 RX ADMIN — MINOXIDIL 2.5 MG: 2.5 TABLET ORAL at 05:12

## 2023-05-18 RX ADMIN — INSULIN HUMAN 3 UNITS: 100 INJECTION, SOLUTION PARENTERAL at 18:15

## 2023-05-18 RX ADMIN — TACROLIMUS 1 MG: 1 CAPSULE ORAL at 18:14

## 2023-05-18 RX ADMIN — MYCOPHENOLATE MOFETIL 250 MG: 250 CAPSULE ORAL at 18:14

## 2023-05-18 RX ADMIN — CEFAZOLIN 2 G: 2 INJECTION, POWDER, FOR SOLUTION INTRAMUSCULAR; INTRAVENOUS at 15:25

## 2023-05-18 RX ADMIN — INSULIN HUMAN 3 UNITS: 100 INJECTION, SOLUTION PARENTERAL at 20:45

## 2023-05-18 RX ADMIN — MIDAZOLAM 2 MG: 1 INJECTION, SOLUTION INTRAMUSCULAR; INTRAVENOUS at 15:24

## 2023-05-18 RX ADMIN — FENTANYL CITRATE 50 MCG: 50 INJECTION, SOLUTION INTRAMUSCULAR; INTRAVENOUS at 15:30

## 2023-05-18 RX ADMIN — LOSARTAN POTASSIUM 100 MG: 50 TABLET, FILM COATED ORAL at 18:14

## 2023-05-18 RX ADMIN — PREDNISONE 10 MG: 10 TABLET ORAL at 05:14

## 2023-05-18 RX ADMIN — TACROLIMUS 1 MG: 1 CAPSULE ORAL at 05:13

## 2023-05-18 RX ADMIN — MIDAZOLAM HYDROCHLORIDE 2 MG: 1 INJECTION, SOLUTION INTRAMUSCULAR; INTRAVENOUS at 15:24

## 2023-05-18 RX ADMIN — SODIUM CHLORIDE: 9 INJECTION, SOLUTION INTRAVENOUS at 08:50

## 2023-05-18 RX ADMIN — LEVOTHYROXINE SODIUM 75 MCG: 0.07 TABLET ORAL at 05:11

## 2023-05-18 RX ADMIN — INSULIN GLARGINE-YFGN 5 UNITS: 100 INJECTION, SOLUTION SUBCUTANEOUS at 18:15

## 2023-05-18 RX ADMIN — ATORVASTATIN CALCIUM 20 MG: 20 TABLET, FILM COATED ORAL at 20:45

## 2023-05-18 ASSESSMENT — COGNITIVE AND FUNCTIONAL STATUS - GENERAL
DAILY ACTIVITIY SCORE: 24
SUGGESTED CMS G CODE MODIFIER DAILY ACTIVITY: CH

## 2023-05-18 ASSESSMENT — LIFESTYLE VARIABLES
TOTAL SCORE: 0
EVER FELT BAD OR GUILTY ABOUT YOUR DRINKING: NO
HAVE YOU EVER FELT YOU SHOULD CUT DOWN ON YOUR DRINKING: NO
HOW MANY TIMES IN THE PAST YEAR HAVE YOU HAD 5 OR MORE DRINKS IN A DAY: 0
EVER HAD A DRINK FIRST THING IN THE MORNING TO STEADY YOUR NERVES TO GET RID OF A HANGOVER: NO
ALCOHOL_USE: NO
ON A TYPICAL DAY WHEN YOU DRINK ALCOHOL HOW MANY DRINKS DO YOU HAVE: 0
AVERAGE NUMBER OF DAYS PER WEEK YOU HAVE A DRINK CONTAINING ALCOHOL: 0
HAVE PEOPLE ANNOYED YOU BY CRITICIZING YOUR DRINKING: NO
DOES PATIENT WANT TO STOP DRINKING: NO
CONSUMPTION TOTAL: NEGATIVE

## 2023-05-18 NOTE — PROGRESS NOTES
"Med rec completed by Legal Shine at [facility] on 5/17/23, prior to transfer:    \"Med rec complete per pt with  IPAD at bedside and medications.   Multiple medications in her bag. I went through these with Pt.   Allergies reviewed.   RP updated per Insulin usage. Pt takes Novolog if BS>200=4 units no matter how high.   Per pt she tests at 12 noon and 3 PM and takes Novolog then if needed. Per Pt she takes Glargine 5 units at 09:30am.\"    "

## 2023-05-18 NOTE — CARE PLAN
The patient is Stable - Low risk of patient condition declining or worsening    Shift Goals  Clinical Goals: labs, comfort  Patient Goals: rest    Progress made toward(s) clinical / shift goals:      Patient is not progressing towards the following goals:

## 2023-05-18 NOTE — OR SURGEON
Immediate Post- Operative Note    PostOp Diagnosis: RENAL OBSTRUCTION. NO CONTRAST FLOW BEYOND DISTAL URETER. BLADDER NOT VIZ.       Procedure(s): RIGHT PELVIC RENAL TRANSPLANT KIDNEY PERCUTANEOUS NEPHROSTOMY PLACEMENT AND NEPHROSTOGRAM WITH U/S AND FLUOROSCOPIC GUIDANCE      Estimated Blood Loss: <5 CC      FINDINGS: MODERATE HYDRO. URINE CLEAR NOT PURULENT. 5 ML SPECIMEN FROM INITIAL NEEDLE PUNCTURE SENT FOR C+S, GRAM        Complications: NONE          5/18/2023     3:47 PM     Jani Cadet M.D.

## 2023-05-18 NOTE — PROGRESS NOTES
Hospital Medicine Daily Progress Note    Date of Service  5/18/2023    Chief Complaint  Tere Gallegos is a 72 y.o. female admitted 5/17/2023 with abnormal labs    Hospital Course  This is a 72-year-old female with past medical history of hypertension, dyslipidemia, type 2 diabetes A1c of 6, paroxysmal atrial fibrillation on Eliquis, CAD with PCI x 2 in RCA, chronic diastolic heart failure, ESRD s/p renal transplant hypothyroidism, restless leg syndrome, and GERD who was transferred from [facility] [place] with acute on chronic SASHA secondary to obstructive uropathy.    Ultrasound noted hydronephrosis of the transplanted kidney, case was discussed with urology with recommendations for percutaneous drain, placed on 5/18.    Interval Problem Update  Patient reports he is feeling well today    Labs reviewed, renal function improving    Case discussed with urology and nephrology, agree with continuation of nephrostomy tube placement.  Nephrostomy tube placed by IR today.    BMP ordered for tomorrow to monitor renal function.     Tacrolimus level pending.    I have discussed this patient's plan of care and discharge plan at IDT rounds today with Case Management, Nursing, Nursing leadership, and other members of the IDT team.    Consultants/Specialty  nephrology and urology    Code Status  Full Code    Disposition  The patient is not medically cleared for discharge to home or a post-acute facility.  Anticipate discharge to: home with close outpatient follow-up    I have placed the appropriate orders for post-discharge needs.    Review of Systems  Review of Systems   All other systems reviewed and are negative.       Physical Exam  Temp:  [36.2 °C (97.2 °F)-36.6 °C (97.8 °F)] 36.3 °C (97.4 °F)  Pulse:  [54-87] 87  Resp:  [12-22] 16  BP: (112-155)/(44-59) 112/46  SpO2:  [92 %-99 %] 96 %    Physical Exam  Vitals and nursing note reviewed.   Constitutional:       General: She is not in acute distress.     Appearance: Normal  appearance.   HENT:      Head: Normocephalic and atraumatic.      Mouth/Throat:      Mouth: Mucous membranes are moist.      Pharynx: No oropharyngeal exudate.   Eyes:      Extraocular Movements: Extraocular movements intact.      Pupils: Pupils are equal, round, and reactive to light.   Cardiovascular:      Rate and Rhythm: Normal rate and regular rhythm.      Pulses: Normal pulses.      Heart sounds: No murmur heard.     No friction rub. No gallop.   Pulmonary:      Effort: Pulmonary effort is normal. No respiratory distress.      Breath sounds: No wheezing, rhonchi or rales.   Abdominal:      General: Bowel sounds are normal. There is no distension.      Palpations: Abdomen is soft. There is no mass.      Tenderness: There is no abdominal tenderness.   Musculoskeletal:         General: No swelling or tenderness. Normal range of motion.      Cervical back: Normal range of motion. No rigidity. No muscular tenderness.      Right lower leg: No edema.      Left lower leg: No edema.   Skin:     General: Skin is warm and dry.      Capillary Refill: Capillary refill takes less than 2 seconds.      Findings: No erythema or rash.   Neurological:      General: No focal deficit present.      Mental Status: She is alert and oriented to person, place, and time.      Motor: No weakness.      Gait: Gait normal.         Fluids  No intake or output data in the 24 hours ending 05/18/23 1649    Laboratory  Recent Labs     05/17/23  1255 05/18/23  0202   WBC 4.8 2.7*   RBC 3.86* 3.72*   HEMOGLOBIN 10.8* 10.4*   HEMATOCRIT 34.5* 34.1*   MCV 89.4 91.7   MCH 28.0 28.0   MCHC 31.3* 30.5*   RDW 47.3 49.5   PLATELETCT 126* 97*   MPV 12.5  --      Recent Labs     05/17/23  1255 05/18/23  0202   SODIUM 142 143   POTASSIUM 4.3 4.5   CHLORIDE 107 115*   CO2 23 16*   GLUCOSE 151* 73   BUN 53* 38*   CREATININE 2.10* 1.61*   CALCIUM 10.1 9.3     Recent Labs     05/18/23  1113   INR 1.29*               Imaging  IR-NEPHROSTOGRAM W/ NEW TUBE  PLACEMENT (ALL RADIOLOGY) RIGHT    (Results Pending)   US-RENAL TRANSPLANT COMP    (Results Pending)        Assessment/Plan  * Hydronephrosis- (present on admission)  Assessment & Plan  acute on chronic SASHA secondary to obstructive uropathy  Ultrasound noted hydronephrosis of the transplanted kidney  Case was discussed with urology and nephrology with recommendations for percutaneous drain, which was placed on 5/18  Intake and Output  Avoid any nephrotoxic agents    Long term current use of immunosuppressive drug- (present on admission)  Assessment & Plan  Secondary to renal transplant  Tacrolimus level pending    Permanent atrial fibrillation (HCC)- (present on admission)  Assessment & Plan  On metoprolol  Eliquis held for nephrostomy tube, will resume     GERD (gastroesophageal reflux disease)- (present on admission)  Assessment & Plan  Resume PPI    Secondary hypercoagulable state (HCC)- (present on admission)  Assessment & Plan  Secondary to A-fib  We will resume Eliquis    Presence of drug-eluting stent in right coronary artery- (present on admission)  Assessment & Plan  History of CAD with 2 stents in RCA  Continue with statin therapy    Acquired hypothyroidism- (present on admission)  Assessment & Plan  Resume Synthroid    ESRD s/p kidney transplant 10/31/22- (present on admission)  Assessment & Plan  On immunosuppressants    Chronic heart failure with preserved ejection fraction (HCC)- (present on admission)  Assessment & Plan  Resume patient's beta-blocker, ACE  Last echo with normal LVEF  Outpatient cardiology follow-up    Mixed hyperlipidemia- (present on admission)  Assessment & Plan  Resume statin therapy    Diabetes (HCC)- (present on admission)  Assessment & Plan  A1c 6  ISS  Hypoglycemia protocol in place         VTE prophylaxis: therapeutic anticoagulation with Eliquis    I have performed a physical exam and reviewed and updated ROS and Plan today (5/18/2023). In review of yesterday's note  (5/17/2023), there are no changes except as documented above.

## 2023-05-18 NOTE — HOSPITAL COURSE
This is a 72-year-old female with past medical history of hypertension, dyslipidemia, type 2 diabetes A1c of 6, paroxysmal atrial fibrillation on Eliquis, CAD with PCI x 2 in RCA, chronic diastolic heart failure, ESRD s/p renal transplant 10/2022 [facility], hypothyroidism, restless leg syndrome, and GERD who was transferred from [facility] with acute on chronic SASHA secondary to obstructive uropathy.    Ultrasound noted hydronephrosis of the transplanted kidney, case was discussed with urology with recommendations for percutaneous drain, placed on 5/18.    Case discussed with [facility] transplant center, agree with continued nephrostomy tube.  They will follow-up with patient outpatient to facilitate kidney biopsy.  Continue all immunosuppressive therapy, reviewed tacrolimus level which is adequate at 3.6.

## 2023-05-18 NOTE — CARE PLAN
Assumed care of pt after receiving report from night shift RN. Pt is A&Ox 4, VSS on RA. Pt denies pain at this time. Pt pleasant and cooperative. IV fluids infusing. Pt calls appropriately and is up self with steady gait.  Bed is locked and in lowest position, call light within reach, fall precautions in place. All needs met at this time.      The patient is Stable - Low risk of patient condition declining or worsening    Shift Goals  Clinical Goals: Monitor labs, remain NPO for surgery  Patient Goals: rest    Progress made toward(s) clinical / shift goals: Pt verbalized understanding of NPO order and IR procedure for today. Followed up with IR, pending INR labs, then pt can be scheduled for procedure.     Problem: Knowledge Deficit - Standard  Goal: Patient and family/care givers will demonstrate understanding of plan of care, disease process/condition, diagnostic tests and medications  Outcome: Progressing    Patient is not progressing towards the following goals:

## 2023-05-18 NOTE — PROGRESS NOTES
[facility] -  PROGRESS NOTE               Author: Favian Evans M.D. Date & Time: 5/18/2023  12:14 PM     HPI:   was used.  Patient is a 72 year old female with a PMHx of DM, HTN, afib, HLD, CAD, hypothyroidism, CHF, ESRD s/p donor kidney transplant in October 2022 [facility].  She's been on Cellcept, tacrolimus, and prednisone, although she doesn't really know the name of her medications.  She states she's been urinating well but urinating more frequently.  She has been having multiple falls at home with generalized weakness, loss of appetite and n/v with RLQ pain.  She had an allograft ultrasound showing hydronephrosis and imaging was reviewed with Dr Rhodes from urology who thinks patient should have perc drain placement.  Had been on Eliquis so has to wait 48 hours.  She will be transferred to [facility].  Nephrology consulted to follow    DAILY NEPHROLOGY SUMMARY:  5/18 - Cr Trending down.  Sitting in chair.  Denies pain or Nausea.    REVIEW OF SYSTEMS:    10 point ROS reviewed and is as per HPI/daily summary or otherwise negative    PMH/PSH/SH/FH:   Reviewed and unchanged since admission note    CURRENT MEDICATIONS:   Reviewed from admission to present day    VS:  BP (!) 149/59   Pulse (!) 58   Temp 36.6 °C (97.8 °F) (Temporal)   Resp 16   LMP  (LMP Unknown)   SpO2 96%     Physical Exam  HENT:      Head: Normocephalic.      Right Ear: External ear normal.      Left Ear: External ear normal.      Nose: Nose normal.      Mouth/Throat:      Mouth: Mucous membranes are moist.   Eyes:      General: No scleral icterus.  Cardiovascular:      Rate and Rhythm: Normal rate.   Pulmonary:      Effort: Pulmonary effort is normal.   Abdominal:      Palpations: Abdomen is soft.   Musculoskeletal:         General: No swelling.   Skin:     General: Skin is warm.   Neurological:      General: No focal deficit present.      Mental Status: She is alert.   Psychiatric:         Mood and  Affect: Mood normal.      Fluids:  No intake/output data recorded.    LABS:  Recent Labs     05/17/23  1255 05/18/23  0202   SODIUM 142 143   POTASSIUM 4.3 4.5   CHLORIDE 107 115*   CO2 23 16*   GLUCOSE 151* 73   BUN 53* 38*   CREATININE 2.10* 1.61*   CALCIUM 10.1 9.3       IMAGING:   All imaging reviewed from admission to present day    IMPRESSION:  # SASHA with Hx renal transplant    - History of renal transplant at [facility] in Oct 2022    - Appears baseline cr fairly labile from 1.3-2.3 today 2.1    -  Imaging suggestive of obstructive uropathy, note that transplanted kidneys can sometimes have some evidence of hydro but urology thinks there is obstruction and recommends perc neph tube    - Possible pre-renal as improved with IVF, holding lasix  # Hydronephrosis    - Awaiting eliquis  # Paroxysmal afib  # CHF with preserved ejection fraction  # GERD  # Hypothyroidism  # HLD  # Anemia     PLAN:  - No compelling indication for RRT  - Continue home immunosuppression medications  - Hold lasix for now  - Urology following  - Will need tacrolimus level in the AM 1 hour before morning dos  - Daily evaluation for RRT needs  - Dose all meds per eGFR      Thank you for the consultation!

## 2023-05-18 NOTE — ASSESSMENT & PLAN NOTE
acute on chronic SASHA secondary to obstructive uropathy  Ultrasound noted hydronephrosis of the transplanted kidney  Case was discussed with urology and nephrology with recommendations for percutaneous drain, which was placed on 5/18  Intake and Output  Avoid any nephrotoxic agents    Case discussed with [facility] transplant center, agree with continued nephrostomy tube.  They will follow-up with patient outpatient to facilitate kidney biopsy.  Continue all immunosuppressive therapy, reviewed tacrolimus level which is adequate at 3.6.

## 2023-05-18 NOTE — THERAPY
Occupational Therapy Contact Note    Patient Name: Tere Gallegos  Age:  72 y.o., Sex:  female  Medical Record #: [id number]  Today's Date: 5/18/2023    OT consult received, pt up self in room and completing all ADLs without assistance; 6 clicks score 24/24 for ADLs. Anticipate pt has no skilled OT needs, order will be completed.    Adebayo Oneil OTD, OTR/L

## 2023-05-18 NOTE — PROGRESS NOTES
4 Eyes Skin Assessment Completed by Malou RN and TRAVIS Stokes.    Head WDL  Ears WDL  Nose WDL  Mouth WDL  Neck WDL  Breast/Chest WDL  Shoulder Blades WDL  Spine WDL  (R) Arm/Elbow/Hand WDL  (L) Arm/Elbow/Hand WDL L upper arm fistula  Abdomen WDL  Groin WDL  Scrotum/Coccyx/Buttocks WDL  (R) Leg WDL  (L) Leg WDL  (R) Heel/Foot/Toe WDL  (L) Heel/Foot/Toe WDL          Devices In Places PIV    Interventions In Place Pillows and Pressure Redistribution Mattress    Possible Skin Injury No

## 2023-05-18 NOTE — CARE PLAN
Problem: Knowledge Deficit - Standard  Goal: Patient and family/care givers will demonstrate understanding of plan of care, disease process/condition, diagnostic tests and medications  Outcome: Progressing   The patient is Stable - Low risk of patient condition declining or worsening    Shift Goals  Clinical Goals: hemodynamic stability  Patient Goals: sleep/comfort    Progress made toward(s) clinical / shift goals:  monitor VS/labs, NPO for procedure, provide interpretive resources.    Patient is not progressing towards the following goals:

## 2023-05-18 NOTE — CONSULTS
UROLOGY Consult Note:    CORIN Alegria  Date & Time note created:    5/18/2023   11:13 AM     Referring MD:  Dr. Carranza    Patient ID:   Name:             Tere Vargas     YOB: 1950  Age:                 72 y.o.  female   MRN:               [id number]                                                             Reason for Consult:      Kidney Transplant, right pelvic kidney  SASHA  Hydronephrosis of transplanted kidney    History of Present Illness:    This is a pleasant 72 y.o. F who was transferred from [facility] for nephrostomy tube placement with IR for hydronephrosis of right pelvic kidney (transplant). Originally presented to the ED as directed by nephrologist due to abnormal kidney function labs. Patient's native kidneys are nonfunctioning. Recent transplant in 11/2022 in [place], on tacrolimus. Cr upon presentation 2.1. Improved today to 1.61. Patient is leukopenic, WBC 2.7. H/H 10.4/34.1. Currently NPO for scheduled nephrostomy tube.     Review of Systems:      Gastrointestinal/Hepatic: Denies abdominal pain   Genitourinary: Denies hematuria, dysuria or frequency  Musculoskeletal/Rheum: Denies flank pain    All other systems were reviewed and are negative (AMA/CMS criteria)                Past Medical History:   Past Medical History:   Diagnosis Date    Acquired hypothyroidism 05/04/2020    CAD (coronary artery disease)     Chronic diastolic heart failure (HCC) 05/04/2020    Coronary artery disease due to lipid rich plaque     2 Synergy NOEMI to 100% RCA stent placed    Dental disorder     partial dentures- uppers    Diabetes (HCC)     oral medication    ESRD (end stage renal disease) on dialysis ([facility]) 05/04/2020    Hemodialysis patient ([facility])     M, W, F    Hyperlipidemia     Hypertension     Kidney transplant candidate     Kidney transplant recipient 10/31/2022    Presence of drug-eluting stent in right coronary artery     QT prolongation 01/22/2020    RLS (restless legs  syndrome) 2016    Transaminitis 2018     Active Hospital Problems    Diagnosis     Hydronephrosis [N13.30]        Past Surgical History:  Past Surgical History:   Procedure Laterality Date    OTHER ABDOMINAL SURGERY Right 10/31/2022     Donor kidney transplant - [facility] CARDIAC CATH  2016    RCA stented with 2 Synergy drug-eluting stents.    RECOVERY  2016    Procedure: CATH LAB [facility] WITH POSSIBLE DR. CASTILLO;  Surgeon: [facility];  Location: SURGERY PRE-POST PROC [facility] [place] [facility];  Service:     [facility] CARDIAC CATH  2016    100% RCA    OTHER Left     left arm upper extremity fistula    OTHER ABDOMINAL SURGERY      left kidney removed due to cancer       Hospital Medications:    Current Facility-Administered Medications:     amLODIPine (NORVASC) tablet 10 mg, 10 mg, Oral, DAILY, Akila Garza M.D., 10 mg at 23 0511    atorvastatin (LIPITOR) tablet 20 mg, 20 mg, Oral, Nightly, Akila Garza M.D.    furosemide (LASIX) tablet 40 mg, 40 mg, Oral, BID, Akila Garza M.D., 40 mg at 23 0511    insulin GLARGINE (Lantus,Semglee) injection, 5 Units, Subcutaneous, Q EVENING, Akila Garza M.D.    insulin regular (HumuLIN R,NovoLIN R) injection, 2-9 Units, Subcutaneous, 4X/DAY ACHS **AND** POC blood glucose manual result, , , Q AC AND BEDTIME(S) **AND** NOTIFY MD and PharmD, , , Once **AND** Administer 20 grams of glucose (approximately 8 ounces of fruit juice) every 15 minutes PRN FSBG less than 70 mg/dL, , , PRN **AND** dextrose 10 % BOLUS 25 g, 25 g, Intravenous, Q15 MIN PRN, Akila Garza M.D.    labetalol (NORMODYNE/TRANDATE) injection 10 mg, 10 mg, Intravenous, Q4HRS PRN, Akila Garza M.D.    levothyroxine (SYNTHROID) tablet 75 mcg, 75 mcg, Oral, AM ES, Akila Garza M.D., 75 mcg at 23 0511    losartan (COZAAR) tablet 100 mg, 100 mg, Oral, Q EVENING, Akila Garza M.D.    metoprolol SR (TOPROL XL) tablet 25 mg, 25 mg, Oral,  DAILY, Akila Garza M.D., 25 mg at 05/18/23 0511    minoxidil (LONITEN) tablet 2.5 mg, 2.5 mg, Oral, DAILY, Akila Garza M.D., 2.5 mg at 05/18/23 0512    mycophenolate (CELLCEPT) capsule 250 mg, 250 mg, Oral, BID, Akila Garza M.D., 250 mg at 05/18/23 0514    NS infusion, , Intravenous, Continuous, Akila Garza M.D., Last Rate: 100 mL/hr at 05/18/23 0850, New Bag at 05/18/23 0850    ondansetron (ZOFRAN ODT) dispertab 4 mg, 4 mg, Oral, Q4HRS PRN, Akila Garza M.D.    ondansetron (ZOFRAN) syringe/vial injection 4 mg, 4 mg, Intravenous, Q4HRS PRN, Akila Garza M.D.    Pharmacy Consult Request - to monitor for nephrotoxic agents, 1 Each, Other, PHARMACY TO DOSE, Akila Garza M.D.    predniSONE (DELTASONE) tablet 10 mg, 10 mg, Oral, DAILY, Akila Garza M.D., 10 mg at 05/18/23 0514    senna-docusate (PERICOLACE or SENOKOT S) 8.6-50 MG per tablet 2 Tablet, 2 Tablet, Oral, BID **AND** polyethylene glycol/lytes (MIRALAX) PACKET 1 Packet, 1 Packet, Oral, QDAY PRN **AND** magnesium hydroxide (MILK OF MAGNESIA) suspension 30 mL, 30 mL, Oral, QDAY PRN **AND** bisacodyl (DULCOLAX) suppository 10 mg, 10 mg, Rectal, QDAY PRN, Akila Garza M.D.    sodium bicarbonate tablet 1,300 mg, 1,300 mg, Oral, TID, Akila Garza M.D., 1,300 mg at 05/18/23 0512    tacrolimus (PROGRAF) capsule 1 mg, 1 mg, Oral, BID, Akila Garza M.D., 1 mg at 05/18/23 0513    thiamine (Vitamin B-1) tablet 100 mg, 100 mg, Oral, DAILY, Akila Garza M.D., 100 mg at 05/18/23 0510    cefTRIAXone (Rocephin) syringe 2,000 mg, 2,000 mg, Intravenous, Q24HRS, Chapo Whitt M.D., 2,000 mg at 05/18/23 0200    Current Outpatient Medications:  Medications Prior to Admission   Medication Sig Dispense Refill Last Dose    tacrolimus (PROGRAF) 1 MG Cap Take 5 mg by mouth 2 times a day.   5/17/2023 at 0930    apixaban (ELIQUIS) 5mg Tab Take 5 mg by mouth 2 times a day.   5/17/2023 at 0930    mycophenolate (CELLCEPT) 250 MG Cap Take 1 Capsule by mouth 2  times a day. 20 Capsule 0 5/17/2023 at 0930    furosemide (LASIX) 40 MG Tab Take 1 Tablet by mouth 2 times a day. 180 Tablet 3 5/17/2023 at 0930    minoxidil (LONITEN) 2.5 MG Tab Take 1 Tablet by mouth every day. 30 Tablet 0 5/17/2023 at 0930    sodium bicarbonate (SODIUM BICARBONATE) 650 MG Tab Take 2 Tablets by mouth in the morning, at noon, and at bedtime. 120 Tablet 0 5/16/2023 at pm    thiamine (THIAMINE) 100 MG tablet Take 1 Tablet by mouth every day. 30 Tablet 0 last week at ran out    metoprolol SR (TOPROL XL) 25 MG TABLET SR 24 HR Take 25 mg by mouth every day.   5/16/2023 at afternoon    levothyroxine (SYNTHROID) 75 MCG Tab Take 1 Tablet by mouth every morning on an empty stomach. 90 Tablet 4 5/17/2023 at 0930    BD PEN NEEDLE PETE 2ND GEN USE AS DIRECTED WITH INSULIN PENS THREE TIMES DAILY BEFORE A MEAL   supply    insulin glargine (LANTUS SOLOSTAR) 100 UNIT/ML Solution Pen-injector injection Inject 5 Units under the skin every day.   5/17/2023 at 0930    amLODIPine (NORVASC) 10 MG Tab Take 10 mg by mouth every day.   5/16/2023 at afternoon    losartan (COZAAR) 100 MG Tab Take 1 Tablet by mouth every evening. 90 Tablet 3 5/16/2023 at afternoon    glucose blood (ONETOUCH VERIO) strip 1 Strip by Other route as needed (on insulin checking 3-4 times a day). 150 Strip 6 supply    Blood Glucose Monitoring Suppl (ONE TOUCH ULTRA 2) w/Device Kit 1 DEVICE 3 TIMES A DAY BEFORE MEALS. 1 Kit 0 supply    atorvastatin (LIPITOR) 20 MG Tab Take 1 Tablet by mouth every evening. 100 Tablet 3 5/16/2023 at 2130    Lancets Use one Freestyle Pearl lancet to test blood sugar once daily . 300 Each 3 supply    insulin aspart (NOVOLOG FLEXPEN) 100 UNIT/ML injection PEN Inject 4 Units under the skin 2 times a day. If BS>200=Pt takes 4 units of Insulin   5/16/2023 at 1500    predniSONE (DELTASONE) 5 MG Tab Take 10 mg by mouth every day.   5/17/2023 at 0930       Medication Allergy:  No Known Allergies    Family History:  Family  History   Problem Relation Age of Onset    Diabetes Sister     Other Sister         liver disease    Diabetes Brother     Heart Disease Neg Hx        Social History:  Social History     Socioeconomic History    Marital status:      Spouse name: Not on file    Number of children: Not on file    Years of education: Not on file    Highest education level: Not on file   Occupational History    Not on file   Tobacco Use    Smoking status: Never    Smokeless tobacco: Never   Vaping Use    Vaping Use: Never used   Substance and Sexual Activity    Alcohol use: No     Alcohol/week: 0.0 oz    Drug use: No    Sexual activity: Never   Other Topics Concern    Not on file   Social History Narrative    Not on file     Social Determinants of Health     Financial Resource Strain: Not on file   Food Insecurity: Not on file   Transportation Needs: Not on file   Physical Activity: Not on file   Stress: Not on file   Social Connections: Not on file   Intimate Partner Violence: Not on file   Housing Stability: Not on file         Physical Exam:  Vitals/ General Appearance:   Weight/BMI: There is no height or weight on file to calculate BMI.  BP (!) 149/59   Pulse (!) 58   Temp 36.6 °C (97.8 °F) (Temporal)   Resp 16   SpO2 96%   Vitals:    05/17/23 2005 05/18/23 0422 05/18/23 0721   BP: 134/54 (!) 141/59 (!) 149/59   Pulse: 63 61 (!) 58   Resp: 14 15 16   Temp: 36.2 °C (97.2 °F) 36.3 °C (97.3 °F) 36.6 °C (97.8 °F)   TempSrc: Temporal Temporal Temporal   SpO2: 95% 96% 96%     Oxygen Therapy:  Pulse Oximetry: 96 %, O2 (LPM): 0, O2 Delivery Device: None - Room Air    Constitutional:   Well developed, Well nourished, No acute distress  HENMT:  Normocephalic, Atraumatic.  Eyes:  EOMI, Conjunctiva normal, No discharge.  Neck:  Normal range of motion.  Lungs:  Normal effort.   Skin: Warm, Dry, No erythema, No rash, no induration.  Neurologic: Alert & oriented x 3, No focal deficits noted.  Psychiatric: Affect normal, Judgment normal,  Mood normal.      MDM (Data Review):     Records reviewed and summarized in current documentation    Lab Data Review:  Recent Results (from the past 24 hour(s))   CBC WITH DIFFERENTIAL    Collection Time: 05/17/23 12:55 PM   Result Value Ref Range    WBC 4.8 4.8 - 10.8 K/uL    RBC 3.86 (L) 4.20 - 5.40 M/uL    Hemoglobin 10.8 (L) 12.0 - 16.0 g/dL    Hematocrit 34.5 (L) 37.0 - 47.0 %    MCV 89.4 81.4 - 97.8 fL    MCH 28.0 27.0 - 33.0 pg    MCHC 31.3 (L) 33.6 - 35.0 g/dL    RDW 47.3 35.9 - 50.0 fL    Platelet Count 126 (L) 164 - 446 K/uL    MPV 12.5 9.0 - 12.9 fL    Neutrophils-Polys 85.70 (H) 44.00 - 72.00 %    Lymphocytes 5.20 (L) 22.00 - 41.00 %    Monocytes 7.70 0.00 - 13.40 %    Eosinophils 0.80 0.00 - 6.90 %    Basophils 0.20 0.00 - 1.80 %    Immature Granulocytes 0.40 0.00 - 0.90 %    Nucleated RBC 0.00 /100 WBC    Neutrophils (Absolute) 4.11 2.00 - 7.15 K/uL    Lymphs (Absolute) 0.25 (L) 1.00 - 4.80 K/uL    Monos (Absolute) 0.37 0.00 - 0.85 K/uL    Eos (Absolute) 0.04 0.00 - 0.51 K/uL    Baso (Absolute) 0.01 0.00 - 0.12 K/uL    Immature Granulocytes (abs) 0.02 0.00 - 0.11 K/uL    NRBC (Absolute) 0.00 K/uL   COMP METABOLIC PANEL    Collection Time: 05/17/23 12:55 PM   Result Value Ref Range    Sodium 142 135 - 145 mmol/L    Potassium 4.3 3.6 - 5.5 mmol/L    Chloride 107 96 - 112 mmol/L    Co2 23 20 - 33 mmol/L    Anion Gap 12.0 7.0 - 16.0    Glucose 151 (H) 65 - 99 mg/dL    Bun 53 (H) 8 - 22 mg/dL    Creatinine 2.10 (H) 0.50 - 1.40 mg/dL    Calcium 10.1 8.4 - 10.2 mg/dL    AST(SGOT) 22 12 - 45 U/L    ALT(SGPT) 29 2 - 50 U/L    Alkaline Phosphatase 90 30 - 99 U/L    Total Bilirubin 0.5 0.1 - 1.5 mg/dL    Albumin 4.5 3.2 - 4.9 g/dL    Total Protein 6.9 6.0 - 8.2 g/dL    Globulin 2.4 1.9 - 3.5 g/dL    A-G Ratio 1.9 g/dL   proBrain Natriuretic Peptide, NT    Collection Time: 05/17/23 12:55 PM   Result Value Ref Range    NT-proBNP 1104 (H) 0 - 125 pg/mL   CORRECTED CALCIUM    Collection Time: 05/17/23 12:55 PM    Result Value Ref Range    Correct Calcium 9.7 8.5 - 10.5 mg/dL   ESTIMATED GFR    Collection Time: 05/17/23 12:55 PM   Result Value Ref Range    GFR (CKD-EPI) 24 (A) >60 mL/min/1.73 m 2   Sed Rate    Collection Time: 05/17/23 12:55 PM   Result Value Ref Range    Sed Rate Westergren 12 0 - 25 mm/hour   Ionized calcium - free    Collection Time: 05/17/23 12:55 PM   Result Value Ref Range    Ionized Calcium 1.22 1.10 - 1.30 mmol/L   Phosphorus    Collection Time: 05/17/23 12:55 PM   Result Value Ref Range    Phosphorus 3.1 2.5 - 4.5 mg/dL   Uric Acid    Collection Time: 05/17/23 12:55 PM   Result Value Ref Range    Uric Acid 8.3 (H) 1.9 - 8.2 mg/dL   Magnesium    Collection Time: 05/17/23 12:55 PM   Result Value Ref Range    Magnesium 2.0 1.5 - 2.5 mg/dL   POCT glucose device results    Collection Time: 05/17/23  6:06 PM   Result Value Ref Range    POC Glucose, Blood 134 (H) 65 - 99 mg/dL   POCT glucose device results    Collection Time: 05/17/23  8:49 PM   Result Value Ref Range    POC Glucose, Blood 107 (H) 65 - 99 mg/dL   Basic Metabolic Panel    Collection Time: 05/18/23  2:02 AM   Result Value Ref Range    Sodium 143 135 - 145 mmol/L    Potassium 4.5 3.6 - 5.5 mmol/L    Chloride 115 (H) 96 - 112 mmol/L    Co2 16 (L) 20 - 33 mmol/L    Glucose 73 65 - 99 mg/dL    Bun 38 (H) 8 - 22 mg/dL    Creatinine 1.61 (H) 0.50 - 1.40 mg/dL    Calcium 9.3 8.5 - 10.5 mg/dL    Anion Gap 12.0 7.0 - 16.0   CBC WITHOUT DIFFERENTIAL    Collection Time: 05/18/23  2:02 AM   Result Value Ref Range    WBC 2.7 (L) 4.8 - 10.8 K/uL    RBC 3.72 (L) 4.20 - 5.40 M/uL    Hemoglobin 10.4 (L) 12.0 - 16.0 g/dL    Hematocrit 34.1 (L) 37.0 - 47.0 %    MCV 91.7 81.4 - 97.8 fL    MCH 28.0 27.0 - 33.0 pg    MCHC 30.5 (L) 33.6 - 35.0 g/dL    RDW 49.5 35.9 - 50.0 fL    Platelet Count 97 (L) 164 - 446 K/uL   ESTIMATED GFR    Collection Time: 05/18/23  2:02 AM   Result Value Ref Range    GFR (CKD-EPI) 34 (A) >60 mL/min/1.73 m 2       Imaging/Procedures  Review:    Reviewed    MDM (Assessment and Plan):     Kidney Transplant, right pelvic kidney  SASHA  Hydronephrosis of transplanted kidney    73 yo F with right pelvic kidney transplant 11/2022. Now with hydro of transplanted kidney and SASHA. Discussed IR intervention with nephrostomy tube placement. Patient agreeable.     Plan:   -Getting STAT INR per IR prior to proceed with IR NT placement  -Monitor renal function  -Recommend getting nephrology on bard due to recently transplanted kidney and possible signs of rejection on RBUS  -Patient to remain NPO for IR procedure  -Urology to follow, appreciate the consult       Case discussed with patient (using  line) and Urology-Dr. Rhodes, who has directed this plan of care.       Magdalena Bolton PA-C  [facility] [place]

## 2023-05-18 NOTE — THERAPY
Physical Therapy Contact Note    Patient Name: Tere Gallegos  Age:  72 y.o., Sex:  female  Medical Record #: [id number]  Today's Date: 5/18/2023    PT consult received, discussed with RN who reports pt is up-self and independent in room, steady gait. Acute PT does not appear indicated at this time. Will complete order, please re-consult should needs/functional status change.

## 2023-05-18 NOTE — PROGRESS NOTES
IR Nursing Note    Right transplant kidney nephrostomy tube placement by MD Cadet assisted by RT Jarrett, LUCA access site.  Patient was consented by MD and confirmed by this RN.     Patient was placed in a supine position,  prepped and draped in a sterile fashion.   Vitals were taken every 5 minutes including ETCO2, and remained stable during procedure (see doc flow sheet for results).      A fr nephrostomy tube placed to the RLQ. A gravity drainage bag and gauze dressing was placed over surgical site. Urine culture collected and delivered to lab by Adebayo per Dr Cadet's verbal instruction.      Report given to TRAVIS Dotson, charge nurse.  Patient transported to [facility] via bed by the IR RN. Patient reassessed by this writer and the receiving nurse. Patient released into the care of the receiving nurse.    8fr X 25cm Fleima Nephrostomy Tube  Ref: [id number]  Lot: [id number]   Exp: 12/18/2025

## 2023-05-18 NOTE — PROGRESS NOTES
I have communicated with Dr. Garza.  Patient is transferred from [facility] upon Dr. Rhodes, Urology request for IR stenting of R hydronephrosis c/b acute kidney injury secondary to obstructive uropathy.   I agree with the assessment and plan written by Dr. Garza.  Patient is immunocompromised and is getting a urologic procedure, thus I have opted to cover her with antibiotic prophylaxis.

## 2023-05-19 PROBLEM — N18.9 ACUTE KIDNEY INJURY SUPERIMPOSED ON CHRONIC KIDNEY DISEASE (HCC): Status: ACTIVE | Noted: 2023-05-19

## 2023-05-19 PROBLEM — N17.9 ACUTE KIDNEY INJURY SUPERIMPOSED ON CHRONIC KIDNEY DISEASE (HCC): Status: ACTIVE | Noted: 2023-05-19

## 2023-05-19 LAB
ANION GAP SERPL CALC-SCNC: 11 MMOL/L (ref 7–16)
BUN SERPL-MCNC: 30 MG/DL (ref 8–22)
CALCIUM SERPL-MCNC: 9.3 MG/DL (ref 8.5–10.5)
CHLORIDE SERPL-SCNC: 111 MMOL/L (ref 96–112)
CO2 SERPL-SCNC: 21 MMOL/L (ref 20–33)
CREAT SERPL-MCNC: 1.26 MG/DL (ref 0.5–1.4)
GFR SERPLBLD CREATININE-BSD FMLA CKD-EPI: 45 ML/MIN/1.73 M 2
GLUCOSE BLD STRIP.AUTO-MCNC: 154 MG/DL (ref 65–99)
GLUCOSE BLD STRIP.AUTO-MCNC: 170 MG/DL (ref 65–99)
GLUCOSE BLD STRIP.AUTO-MCNC: 172 MG/DL (ref 65–99)
GLUCOSE BLD STRIP.AUTO-MCNC: 87 MG/DL (ref 65–99)
GLUCOSE SERPL-MCNC: 76 MG/DL (ref 65–99)
POTASSIUM SERPL-SCNC: 4.3 MMOL/L (ref 3.6–5.5)
SODIUM SERPL-SCNC: 143 MMOL/L (ref 135–145)
TACROLIMUS BLD-MCNC: 3.6 NG/ML (ref 5–20)

## 2023-05-19 PROCEDURE — 76776 US EXAM K TRANSPL W/DOPPLER: CPT

## 2023-05-19 PROCEDURE — 99232 SBSQ HOSP IP/OBS MODERATE 35: CPT | Performed by: GENERAL PRACTICE

## 2023-05-19 PROCEDURE — 700111 HCHG RX REV CODE 636 W/ 250 OVERRIDE (IP): Performed by: INTERNAL MEDICINE

## 2023-05-19 PROCEDURE — 700102 HCHG RX REV CODE 250 W/ 637 OVERRIDE(OP): Performed by: HOSPITALIST

## 2023-05-19 PROCEDURE — 80048 BASIC METABOLIC PNL TOTAL CA: CPT

## 2023-05-19 PROCEDURE — 770006 HCHG ROOM/CARE - MED/SURG/GYN SEMI*

## 2023-05-19 PROCEDURE — 700111 HCHG RX REV CODE 636 W/ 250 OVERRIDE (IP): Performed by: HOSPITALIST

## 2023-05-19 PROCEDURE — 700102 HCHG RX REV CODE 250 W/ 637 OVERRIDE(OP): Performed by: GENERAL PRACTICE

## 2023-05-19 PROCEDURE — A9270 NON-COVERED ITEM OR SERVICE: HCPCS | Performed by: HOSPITALIST

## 2023-05-19 PROCEDURE — 700105 HCHG RX REV CODE 258: Performed by: HOSPITALIST

## 2023-05-19 PROCEDURE — 36415 COLL VENOUS BLD VENIPUNCTURE: CPT

## 2023-05-19 PROCEDURE — 700111 HCHG RX REV CODE 636 W/ 250 OVERRIDE (IP): Performed by: STUDENT IN AN ORGANIZED HEALTH CARE EDUCATION/TRAINING PROGRAM

## 2023-05-19 PROCEDURE — 82962 GLUCOSE BLOOD TEST: CPT

## 2023-05-19 PROCEDURE — A9270 NON-COVERED ITEM OR SERVICE: HCPCS | Performed by: GENERAL PRACTICE

## 2023-05-19 RX ADMIN — TACROLIMUS 1 MG: 1 CAPSULE ORAL at 17:21

## 2023-05-19 RX ADMIN — LOSARTAN POTASSIUM 100 MG: 50 TABLET, FILM COATED ORAL at 17:19

## 2023-05-19 RX ADMIN — SODIUM BICARBONATE 1300 MG: 650 TABLET ORAL at 17:18

## 2023-05-19 RX ADMIN — Medication 100 MG: at 06:02

## 2023-05-19 RX ADMIN — TACROLIMUS 1 MG: 1 CAPSULE ORAL at 06:03

## 2023-05-19 RX ADMIN — AMLODIPINE BESYLATE 10 MG: 10 TABLET ORAL at 06:02

## 2023-05-19 RX ADMIN — LEVOTHYROXINE SODIUM 75 MCG: 0.07 TABLET ORAL at 06:03

## 2023-05-19 RX ADMIN — INSULIN GLARGINE-YFGN 5 UNITS: 100 INJECTION, SOLUTION SUBCUTANEOUS at 17:23

## 2023-05-19 RX ADMIN — INSULIN HUMAN 2 UNITS: 100 INJECTION, SOLUTION PARENTERAL at 20:33

## 2023-05-19 RX ADMIN — MYCOPHENOLATE MOFETIL 250 MG: 250 CAPSULE ORAL at 17:18

## 2023-05-19 RX ADMIN — MINOXIDIL 2.5 MG: 2.5 TABLET ORAL at 06:03

## 2023-05-19 RX ADMIN — CEFTRIAXONE SODIUM 2000 MG: 10 INJECTION, POWDER, FOR SOLUTION INTRAVENOUS at 06:04

## 2023-05-19 RX ADMIN — SENNOSIDES AND DOCUSATE SODIUM 2 TABLET: 50; 8.6 TABLET ORAL at 17:19

## 2023-05-19 RX ADMIN — SODIUM BICARBONATE 1300 MG: 650 TABLET ORAL at 06:02

## 2023-05-19 RX ADMIN — SODIUM CHLORIDE: 9 INJECTION, SOLUTION INTRAVENOUS at 00:07

## 2023-05-19 RX ADMIN — MYCOPHENOLATE MOFETIL 250 MG: 250 CAPSULE ORAL at 06:03

## 2023-05-19 RX ADMIN — INSULIN HUMAN 2 UNITS: 100 INJECTION, SOLUTION PARENTERAL at 13:19

## 2023-05-19 RX ADMIN — METOPROLOL SUCCINATE 25 MG: 25 TABLET, EXTENDED RELEASE ORAL at 06:03

## 2023-05-19 RX ADMIN — INSULIN HUMAN 2 UNITS: 100 INJECTION, SOLUTION PARENTERAL at 17:23

## 2023-05-19 RX ADMIN — PREDNISONE 5 MG: 5 TABLET ORAL at 06:03

## 2023-05-19 RX ADMIN — APIXABAN 5 MG: 5 TABLET, FILM COATED ORAL at 17:17

## 2023-05-19 RX ADMIN — SODIUM BICARBONATE 1300 MG: 650 TABLET ORAL at 13:19

## 2023-05-19 RX ADMIN — ATORVASTATIN CALCIUM 20 MG: 20 TABLET, FILM COATED ORAL at 20:33

## 2023-05-19 ASSESSMENT — CHA2DS2 SCORE
CHF OR LEFT VENTRICULAR DYSFUNCTION: YES
PRIOR STROKE OR TIA OR THROMBOEMBOLISM: NO
AGE 75 OR GREATER: NO
DIABETES: YES
AGE 65 TO 74: YES
SEX: FEMALE
CHA2DS2 VASC SCORE: 6
HYPERTENSION: YES
VASCULAR DISEASE: YES

## 2023-05-19 ASSESSMENT — FIBROSIS 4 INDEX: FIB4 SCORE: 3.07

## 2023-05-19 NOTE — CARE PLAN
The patient is Stable - Low risk of patient condition declining or worsening    Shift Goals  Clinical Goals: monitor labs, post op  Patient Goals: rest    Progress made toward(s) clinical / shift goals:      Patient is not progressing towards the following goals:

## 2023-05-19 NOTE — PROGRESS NOTES
Hospital Medicine Daily Progress Note    Date of Service  5/19/2023    Chief Complaint  Tere Gallegos is a 72 y.o. female admitted 5/17/2023 with abnormal labs    Hospital Course  This is a 72-year-old female with past medical history of hypertension, dyslipidemia, type 2 diabetes A1c of 6, paroxysmal atrial fibrillation on Eliquis, CAD with PCI x 2 in RCA, chronic diastolic heart failure, ESRD s/p renal transplant 10/2022 [facility], hypothyroidism, restless leg syndrome, and GERD who was transferred from [facility] with acute on chronic SASHA secondary to obstructive uropathy.    Ultrasound noted hydronephrosis of the transplanted kidney, case was discussed with urology with recommendations for percutaneous drain, placed on 5/18.    Case discussed with [facility] transplant center, agree with continued nephrostomy tube.  They will follow-up with patient outpatient to facilitate kidney biopsy.  Continue all immunosuppressive therapy, reviewed tacrolimus level which is adequate at 3.6.    Interval Problem Update  Case discussed with [facility] transplant center, agree with continued nephrostomy tube.  They will follow-up with patient outpatient to facilitate kidney biopsy.  Continue all immunosuppressive therapy, reviewed tacrolimus level which is adequate at 3.6.    Eliquis resumed for tonight.    Renal function continues to improve.  Continue to monitor with BMP ordered for tomorrow morning.    Discussed the case with urology, recommend continuation of nephrostomy tube.  We will follow-up with her outpatient.    Case discussed with nephrology, agree with conversation with [facility].    Patient's family was updated at bedside over speaker phone, updated on plan of care, all questions answered.    I have discussed this patient's plan of care and discharge plan at IDT rounds today with Case Management, Nursing, Nursing leadership, and other members of the IDT team.    Consultants/Specialty  nephrology and urology    Code Status  Full  Code    Disposition  The patient is not medically cleared for discharge to home or a post-acute facility.  Anticipate discharge to: home with organized home healthcare and close outpatient follow-up    I have placed the appropriate orders for post-discharge needs.    Review of Systems  Review of Systems   All other systems reviewed and are negative.       Physical Exam  Temp:  [36 °C (96.8 °F)-36.7 °C (98.1 °F)] 36.7 °C (98.1 °F)  Pulse:  [54-88] 67  Resp:  [12-22] 15  BP: (112-149)/(44-57) 145/46  SpO2:  [90 %-99 %] 93 %    Physical Exam  Vitals and nursing note reviewed.   Constitutional:       General: She is not in acute distress.     Appearance: Normal appearance.   HENT:      Head: Normocephalic and atraumatic.      Mouth/Throat:      Mouth: Mucous membranes are moist.      Pharynx: No oropharyngeal exudate.   Eyes:      Extraocular Movements: Extraocular movements intact.      Pupils: Pupils are equal, round, and reactive to light.   Cardiovascular:      Rate and Rhythm: Normal rate and regular rhythm.      Pulses: Normal pulses.      Heart sounds: No murmur heard.     No friction rub. No gallop.   Pulmonary:      Effort: Pulmonary effort is normal. No respiratory distress.      Breath sounds: No wheezing, rhonchi or rales.   Abdominal:      General: Bowel sounds are normal. There is no distension.      Palpations: Abdomen is soft. There is no mass.      Tenderness: There is no abdominal tenderness.   Musculoskeletal:         General: No swelling or tenderness. Normal range of motion.      Cervical back: Normal range of motion. No rigidity. No muscular tenderness.      Right lower leg: No edema.      Left lower leg: No edema.      Comments: Nephrostomy tube in place, adequate output   Skin:     General: Skin is warm and dry.      Capillary Refill: Capillary refill takes less than 2 seconds.      Findings: No erythema or rash.   Neurological:      General: No focal deficit present.      Mental Status: She is  alert and oriented to person, place, and time.      Motor: No weakness.      Gait: Gait normal.         Fluids    Intake/Output Summary (Last 24 hours) at 5/19/2023 1509  Last data filed at 5/19/2023 0604  Gross per 24 hour   Intake --   Output 1000 ml   Net -1000 ml       Laboratory  Recent Labs     05/17/23  1255 05/18/23  0202   WBC 4.8 2.7*   RBC 3.86* 3.72*   HEMOGLOBIN 10.8* 10.4*   HEMATOCRIT 34.5* 34.1*   MCV 89.4 91.7   MCH 28.0 28.0   MCHC 31.3* 30.5*   RDW 47.3 49.5   PLATELETCT 126* 97*   MPV 12.5  --      Recent Labs     05/17/23  1255 05/18/23  0202 05/19/23  0127   SODIUM 142 143 143   POTASSIUM 4.3 4.5 4.3   CHLORIDE 107 115* 111   CO2 23 16* 21   GLUCOSE 151* 73 76   BUN 53* 38* 30*   CREATININE 2.10* 1.61* 1.26   CALCIUM 10.1 9.3 9.3     Recent Labs     05/18/23  1113   INR 1.29*               Imaging  US-RENAL TRANSPLANT COMP   Final Result         1.  Elevated upper, mid, and lower pole resistive indices as described.   2.  Elevated upper, mid, and lower pole acceleration time as described.   3.  Minimal prominence of the renal pelvis, could represent extrarenal pelvis morphology or trace hydronephrosis.      IR-NEPHROSTOGRAM W/ NEW TUBE PLACEMENT (ALL RADIOLOGY) RIGHT   Final Result      1. ULTRASOUND AND FLUOROSCOPIC GUIDED PLACEMENT OF A RIGHT PELVIC RENAL TRANSPLANT KIDNEY 8 [place]  PIGTAIL LOCKING LOOP PERCUTANEOUS NEPHROSTOMY.      2. MODERATE HYDRONEPHROSIS OF RIGHT PELVIC RENAL TRANSLATION KIDNEY. PROBABLE DISTAL URETERAL OBSTRUCTION. FORMAL NEPHROSTOGRAM MAY BE OF INTEREST TO FURTHER EVALUATE FOR DISTAL URETERAL STENOSIS. A NONRADIOPAQUE OR FAINTLY CALCIFIED URETERAL CALCULUS IS    NOT EXCLUDED. CT MIGHT ALSO BE OF INTEREST.           Assessment/Plan  * Hydronephrosis- (present on admission)  Assessment & Plan  acute on chronic SASHA secondary to obstructive uropathy  Ultrasound noted hydronephrosis of the transplanted kidney  Case was discussed with urology and nephrology with  recommendations for percutaneous drain, which was placed on 5/18  Intake and Output  Avoid any nephrotoxic agents    Case discussed with [facility] transplant center, agree with continued nephrostomy tube.  They will follow-up with patient outpatient to facilitate kidney biopsy.  Continue all immunosuppressive therapy, reviewed tacrolimus level which is adequate at 3.6.    Acute kidney injury superimposed on chronic kidney disease (HCC)  Assessment & Plan  Secondary to suspected obstructive uropathy  Hydronephrosis  Nephrostomy tube placed on 5/18  Renal function returned to baseline    Long term current use of immunosuppressive drug- (present on admission)  Assessment & Plan  Secondary to renal transplant  Tacrolimus level pending    Permanent atrial fibrillation (HCC)- (present on admission)  Assessment & Plan  On metoprolol  Eliquis held for nephrostomy tube, resumed 5/19 p.m.    GERD (gastroesophageal reflux disease)- (present on admission)  Assessment & Plan  Resume PPI    Secondary hypercoagulable state ([facility])- (present on admission)  Assessment & Plan  Secondary to A-fib  Resumed Eliquis 5/19 p.m.    Presence of drug-eluting stent in right coronary artery- (present on admission)  Assessment & Plan  History of CAD with 2 stents in RCA  Continue with statin therapy    Acquired hypothyroidism- (present on admission)  Assessment & Plan  Resume Synthroid    ESRD s/p kidney transplant 10/31/22- (present on admission)  Assessment & Plan  On immunosuppressants    Chronic heart failure with preserved ejection fraction (HCC)- (present on admission)  Assessment & Plan  Resume patient's beta-blocker, ACE  Last echo with normal LVEF  Outpatient cardiology follow-up    Mixed hyperlipidemia- (present on admission)  Assessment & Plan  Resume statin therapy    Diabetes ([facility])- (present on admission)  Assessment & Plan  A1c 6  ISS  Hypoglycemia protocol in place         VTE prophylaxis: therapeutic anticoagulation with Eliquis    I  have performed a physical exam and reviewed and updated ROS and Plan today (5/19/2023). In review of yesterday's note (5/18/2023), there are no changes except as documented above.

## 2023-05-19 NOTE — PROGRESS NOTES
UROLOGY Progress Note    Patient ID:  Name:             Tere Vargas     YOB: 1950  Age:                 72 y.o.  female   MRN:               [id number]                                                             History of Present Illness:    This is a pleasant 72 y.o. F who was transferred from [facility] for nephrostomy tube placement with IR for hydronephrosis of right pelvic kidney (transplant). Originally presented to the ED as directed by nephrologist due to abnormal kidney function labs. Patient's native kidneys are nonfunctioning. Recent transplant in 11/2022 in [place], on tacrolimus.       Interval Updates:    5/19. Patient resting comfortably in bed, R NPT draining clear yellow urine, small amount of blood sediment. Good UO from R NPT with 1L/24hrs. Creatinine improved, now 1.26 (2.44 on admission). H/H stable. Reviewed RBUS results. AFVSS. Nephrology following. [place] translation provided via floor staff today.    5/18. R NPT was placed by Dr. Cadet, IR      Review of Systems:      Gastrointestinal/Hepatic: Denies abdominal pain   Genitourinary: Denies hematuria, dysuria or frequency  Musculoskeletal/Rheum: Denies flank pain    All other systems were reviewed and are negative (AMA/CMS criteria)              Past Medical History:   Past Medical History:   Diagnosis Date    Acquired hypothyroidism 05/04/2020    CAD (coronary artery disease)     Chronic diastolic heart failure (HCC) 05/04/2020    Coronary artery disease due to lipid rich plaque     2 Synergy NOEMI to 100% RCA stent placed    Dental disorder     partial dentures- uppers    Diabetes (HCC)     oral medication    ESRD (end stage renal disease) on dialysis ([facility]) 05/04/2020    Hemodialysis patient ([facility])     M, W, F    Hyperlipidemia     Hypertension     Kidney transplant candidate     Kidney transplant recipient 10/31/2022    Presence of drug-eluting stent in right coronary artery     QT prolongation 01/22/2020    RLS  (restless legs syndrome) 2016    Transaminitis 2018     Active Hospital Problems    Diagnosis     Acute kidney injury superimposed on chronic kidney disease (HCC) [N17.9, N18.9]     Hydronephrosis [N13.30]     Long term current use of immunosuppressive drug [Z79.899]     Permanent atrial fibrillation (HCC) [I48.21]     GERD (gastroesophageal reflux disease) [K21.9]     Secondary hypercoagulable state (HCC) [D68.69]     Presence of drug-eluting stent in right coronary artery [Z95.5]     ESRD s/p kidney transplant 10/31/22 [N18.6]     Chronic heart failure with preserved ejection fraction (HCC) [I50.32]     Acquired hypothyroidism [E03.9]     Mixed hyperlipidemia [E78.2]     Diabetes (HCC) [E11.9]        Past Surgical History:  Past Surgical History:   Procedure Laterality Date    OTHER ABDOMINAL SURGERY Right 10/31/2022     Donor kidney transplant - [facility] CARDIAC CATH  2016    RCA stented with 2 Synergy drug-eluting stents.    RECOVERY  2016    Procedure: CATH LAB [facility] WITH POSSIBLE DR. CASTILLO;  Surgeon: [facility];  Location: SURGERY PRE-POST PROC UNIT [facility];  Service:     [facility] CARDIAC CATH  2016    100% RCA    OTHER Left 2014    left arm upper extremity fistula    OTHER ABDOMINAL SURGERY      left kidney removed due to cancer       Hospital Medications:    Current Facility-Administered Medications:     predniSONE (DELTASONE) tablet 5 mg, 5 mg, Oral, DAILY, Favian Evans M.D., 5 mg at 23 0603    Pharmacy Consult Request ...Pain Management Review 1 Each, 1 Each, Other, PHARMACY TO DOSE, LIZETT AvitiaP.R.N.    oxyCODONE immediate-release (ROXICODONE) tablet 5 mg, 5 mg, Oral, Q3HRS PRN **OR** oxyCODONE immediate release (ROXICODONE) tablet 10 mg, 10 mg, Oral, Q3HRS PRN, 10 mg at 23 2150 **OR** HYDROmorphone (Dilaudid) injection 0.5 mg, 0.5 mg, Intravenous, Q3HRS PRN, MARKELL Avitia.P.R.N.    amLODIPine (NORVASC)  tablet 10 mg, 10 mg, Oral, DAILY, Akila Garza M.D., 10 mg at 05/19/23 0602    atorvastatin (LIPITOR) tablet 20 mg, 20 mg, Oral, Nightly, Akila Garza M.D., 20 mg at 05/18/23 2045    insulin GLARGINE (Lantus,Semglee) injection, 5 Units, Subcutaneous, Q EVENING, Akila Garza M.D., 5 Units at 05/18/23 1815    insulin regular (HumuLIN R,NovoLIN R) injection, 2-9 Units, Subcutaneous, 4X/DAY ACHS, 3 Units at 05/18/23 2045 **AND** POC blood glucose manual result, , , Q AC AND BEDTIME(S) **AND** NOTIFY MD and PharmD, , , Once **AND** Administer 20 grams of glucose (approximately 8 ounces of fruit juice) every 15 minutes PRN FSBG less than 70 mg/dL, , , PRN **AND** dextrose 10 % BOLUS 25 g, 25 g, Intravenous, Q15 MIN PRN, Akila Garza M.D.    labetalol (NORMODYNE/TRANDATE) injection 10 mg, 10 mg, Intravenous, Q4HRS PRN, Akila Garza M.D.    levothyroxine (SYNTHROID) tablet 75 mcg, 75 mcg, Oral, AM ES, Akila Garza M.D., 75 mcg at 05/19/23 0603    losartan (COZAAR) tablet 100 mg, 100 mg, Oral, Q EVENING, Akila Garza M.D., 100 mg at 05/18/23 1814    metoprolol SR (TOPROL XL) tablet 25 mg, 25 mg, Oral, DAILY, SERGEI BolivarDOctaviano, 25 mg at 05/19/23 0603    minoxidil (LONITEN) tablet 2.5 mg, 2.5 mg, Oral, DAILY, SERGEI BolivarD., 2.5 mg at 05/19/23 0603    mycophenolate (CELLCEPT) capsule 250 mg, 250 mg, Oral, BID, Akila Garza M.D., 250 mg at 05/19/23 0603    ondansetron (ZOFRAN ODT) dispertab 4 mg, 4 mg, Oral, Q4HRS PRN, Akila Garza M.D.    ondansetron (ZOFRAN) syringe/vial injection 4 mg, 4 mg, Intravenous, Q4HRS PRN, Akila Garza M.D.    Pharmacy Consult Request - to monitor for nephrotoxic agents, 1 Each, Other, PHARMACY TO DOSE, Akila Garza M.D.    senna-docusate (PERICOLACE or SENOKOT S) 8.6-50 MG per tablet 2 Tablet, 2 Tablet, Oral, BID, 2 Tablet at 05/18/23 1814 **AND** polyethylene glycol/lytes (MIRALAX) PACKET 1 Packet, 1 Packet, Oral, QDAY PRN **AND** magnesium hydroxide (MILK OF MAGNESIA)  suspension 30 mL, 30 mL, Oral, QDAY PRN **AND** bisacodyl (DULCOLAX) suppository 10 mg, 10 mg, Rectal, QDAY PRN, Akila Garza M.D.    sodium bicarbonate tablet 1,300 mg, 1,300 mg, Oral, TID, Akila Garza M.D., 1,300 mg at 05/19/23 0602    tacrolimus (PROGRAF) capsule 1 mg, 1 mg, Oral, BID, Akila Garza M.D., 1 mg at 05/19/23 0603    thiamine (Vitamin B-1) tablet 100 mg, 100 mg, Oral, DAILY, Akila Garza M.D., 100 mg at 05/19/23 0602    cefTRIAXone (Rocephin) syringe 2,000 mg, 2,000 mg, Intravenous, Q24HRS, Chapo Whitt M.D., 2,000 mg at 05/19/23 0604    Current Outpatient Medications:  Medications Prior to Admission   Medication Sig Dispense Refill Last Dose    tacrolimus (PROGRAF) 1 MG Cap Take 5 mg by mouth 2 times a day.   5/17/2023 at 0930    apixaban (ELIQUIS) 5mg Tab Take 5 mg by mouth 2 times a day.   5/17/2023 at 0930    mycophenolate (CELLCEPT) 250 MG Cap Take 1 Capsule by mouth 2 times a day. 20 Capsule 0 5/17/2023 at 0930    furosemide (LASIX) 40 MG Tab Take 1 Tablet by mouth 2 times a day. 180 Tablet 3 5/17/2023 at 0930    minoxidil (LONITEN) 2.5 MG Tab Take 1 Tablet by mouth every day. 30 Tablet 0 5/17/2023 at 0930    sodium bicarbonate (SODIUM BICARBONATE) 650 MG Tab Take 2 Tablets by mouth in the morning, at noon, and at bedtime. 120 Tablet 0 5/16/2023 at pm    thiamine (THIAMINE) 100 MG tablet Take 1 Tablet by mouth every day. 30 Tablet 0 last week at ran out    metoprolol SR (TOPROL XL) 25 MG TABLET SR 24 HR Take 25 mg by mouth every day.   5/16/2023 at afternoon    levothyroxine (SYNTHROID) 75 MCG Tab Take 1 Tablet by mouth every morning on an empty stomach. 90 Tablet 4 5/17/2023 at 0930    BD PEN NEEDLE PETE 2ND GEN USE AS DIRECTED WITH INSULIN PENS THREE TIMES DAILY BEFORE A MEAL   supply    insulin glargine (LANTUS SOLOSTAR) 100 UNIT/ML Solution Pen-injector injection Inject 5 Units under the skin every day.   5/17/2023 at 0930    amLODIPine (NORVASC) 10 MG Tab Take 10 mg by mouth  every day.   5/16/2023 at afternoon    losartan (COZAAR) 100 MG Tab Take 1 Tablet by mouth every evening. 90 Tablet 3 5/16/2023 at afternoon    glucose blood (ONETOUCH VERIO) strip 1 Strip by Other route as needed (on insulin checking 3-4 times a day). 150 Strip 6 supply    Blood Glucose Monitoring Suppl (ONE TOUCH ULTRA 2) w/Device Kit 1 DEVICE 3 TIMES A DAY BEFORE MEALS. 1 Kit 0 supply    atorvastatin (LIPITOR) 20 MG Tab Take 1 Tablet by mouth every evening. 100 Tablet 3 5/16/2023 at 2130    Lancets Use one Freestyle Pearl lancet to test blood sugar once daily . 300 Each 3 supply    insulin aspart (NOVOLOG FLEXPEN) 100 UNIT/ML injection PEN Inject 4 Units under the skin 2 times a day. If BS>200=Pt takes 4 units of Insulin   5/16/2023 at 1500    predniSONE (DELTASONE) 5 MG Tab Take 10 mg by mouth every day.   5/17/2023 at 0930       Medication Allergy:  No Known Allergies    Family History:  Family History   Problem Relation Age of Onset    Diabetes Sister     Other Sister         liver disease    Diabetes Brother     Heart Disease Neg Hx        Social History:  Social History     Socioeconomic History    Marital status:      Spouse name: Not on file    Number of children: Not on file    Years of education: Not on file    Highest education level: Not on file   Occupational History    Not on file   Tobacco Use    Smoking status: Never    Smokeless tobacco: Never   Vaping Use    Vaping Use: Never used   Substance and Sexual Activity    Alcohol use: No     Alcohol/week: 0.0 oz    Drug use: No    Sexual activity: Never   Other Topics Concern    Not on file   Social History Narrative    Not on file     Social Determinants of Health     Financial Resource Strain: Not on file   Food Insecurity: Not on file   Transportation Needs: Not on file   Physical Activity: Not on file   Stress: Not on file   Social Connections: Not on file   Intimate Partner Violence: Not on file   Housing Stability: Not on file          Physical Exam:  Vitals/ General Appearance:   Weight/BMI: There is no height or weight on file to calculate BMI.  BP (!) 145/46   Pulse 67   Temp 36.7 °C (98.1 °F) (Temporal)   Resp 15   SpO2 93%   Vitals:    05/18/23 2015 05/19/23 0335 05/19/23 0336 05/19/23 0754   BP: 128/52  127/57 (!) 145/46   Pulse: 67 84  67   Resp: 16 16  15   Temp: 36.6 °C (97.9 °F) 36.3 °C (97.4 °F)  36.7 °C (98.1 °F)   TempSrc: Temporal Temporal  Temporal   SpO2: 95% 96%  93%     Oxygen Therapy:  Pulse Oximetry: 93 %, O2 (LPM): 2, O2 Delivery Device: Nasal Cannula    Constitutional:   Well developed, Well nourished, No acute distress  HENMT:  Normocephalic, Atraumatic.  Eyes:  EOMI, Conjunctiva normal, No discharge.  Neck:  Normal range of motion.  Lungs:  Normal effort.   Skin: Warm, Dry, No erythema, No rash, no induration.  Neurologic: Alert & oriented x 3, No focal deficits noted.  Psychiatric: Affect normal, Judgment normal, Mood normal.      MDM (Data Review):     Records reviewed and summarized in current documentation    Lab Data Review:  Recent Results (from the past 24 hour(s))   URINE CULTURE(NEW)    Collection Time: 05/18/23  3:40 PM    Specimen: Urine, Ureter; Other   Result Value Ref Range    Significant Indicator NEG     Source URSP     Site R kidney     Culture Result -     Gram Stain Result Few WBCs.  No organisms seen.      GRAM STAIN    Collection Time: 05/18/23  3:40 PM    Specimen: Other   Result Value Ref Range    Significant Indicator .     Source URSP     Site R kidney     Gram Stain Result Few WBCs.  No organisms seen.      POCT glucose device results    Collection Time: 05/18/23  6:12 PM   Result Value Ref Range    POC Glucose, Blood 234 (H) 65 - 99 mg/dL   POCT glucose device results    Collection Time: 05/18/23  8:19 PM   Result Value Ref Range    POC Glucose, Blood 232 (H) 65 - 99 mg/dL   Basic Metabolic Panel    Collection Time: 05/19/23  1:27 AM   Result Value Ref Range    Sodium 143 135 - 145  mmol/L    Potassium 4.3 3.6 - 5.5 mmol/L    Chloride 111 96 - 112 mmol/L    Co2 21 20 - 33 mmol/L    Glucose 76 65 - 99 mg/dL    Bun 30 (H) 8 - 22 mg/dL    Creatinine 1.26 0.50 - 1.40 mg/dL    Calcium 9.3 8.5 - 10.5 mg/dL    Anion Gap 11.0 7.0 - 16.0   ESTIMATED GFR    Collection Time: 05/19/23  1:27 AM   Result Value Ref Range    GFR (CKD-EPI) 45 (A) >60 mL/min/1.73 m 2   POCT glucose device results    Collection Time: 05/19/23  9:16 AM   Result Value Ref Range    POC Glucose, Blood 87 65 - 99 mg/dL       Imaging/Procedures Review:    Reviewed    MDM (Assessment and Plan):     Kidney Transplant, right pelvic kidney  SASHA  Hydronephrosis of transplanted kidney    71 yo F with right pelvic kidney transplant 11/2022. Now with hydro of transplanted kidney and SASHA, s/p R NPT placement with IR evening of 5/18.    Reviewed most recent RBUS which resulted early AM on 5/19.    Plan:   -Monitor renal function  -Please document R NPT output  -Nephrology following  -Urology will continue to follow    Case discussed with patient, hospitalist, and urology team.    Jannette Castillo PA-C  [facility] [place]

## 2023-05-19 NOTE — CARE PLAN
Received bedside report from night shift RN.   Assumed care of patient at change of shift.   Assessment complete and POC discussed.   Patient is A&Ox 4, VSS, on 2 L O2 via NC O2 weaned as tolerated throughout the day. Pt O2 saturations >90% on 0.5L O2 via NC.  Patient denies pain, no apparent signs of distress or discomfort.   Bed is in lowest/locked position.   Call light and belongings are within reach.   No further needs at this time.    The patient is Stable - Low risk of patient condition declining or worsening    Shift Goals  Clinical Goals: pain mangement, drain maintenance  Patient Goals: rest    Progress made toward(s) clinical / shift goals: Pain controlled, medication administered per MAR orders and assessment.       Problem: Knowledge Deficit - Standard  Goal: Patient and family/care givers will demonstrate understanding of plan of care, disease process/condition, diagnostic tests and medications  Outcome: Progressing     Problem: Pain - Standard  Goal: Alleviation of pain or a reduction in pain to the patient’s comfort goal  Outcome: Progressing       Patient is not progressing towards the following goals:

## 2023-05-19 NOTE — DIETARY
Nutrition services: Day 2 of admit.  Tere Gallegos is a 72 y.o. female with admitting DX of acute hydronephrosis of transplated kidney.    Consult received for Renal. Pt German speaking only, IPAD for  not working but RN available to translate. Pt reports normal intake PTA of 2 meals/day and unsure of UBW; explains PO intake was poor after Renal transplant in November but now improving and ate % of last three meals. Pt states she has noticed that her arms and legs are thinner. Pt states she drinks 1-2 Nepro supplement drinks at home and would like them sent twice daily with meals. Reviewed that supplement drinks should be for when PO intake is poor and taken less when PO intake is going well, pt verbalized understanding and states she only drinks them when her intake is low with no further questions/concerns.       Assessment:     There is no height or weight on file to calculate BMI.,  Diet/Intake: Renal.     Evaluation:   Nutrition Focused Physical Exam: Pt with slight hollowing at temple, little fat to upper arm but not ample, some roundness/firmness to calf region.   Pertinent Meds: Lantus, SSI, Sodium Bicarbonate, Thiamine.  Pertinent Labs: BUN 30-trending down.     Malnutrition Risk: Pt meets ASPEN criteria for moderate chronic disease related malnutrition likely related to complex medical hx of CHF and SASHA with recent kidney transplant as evidenced  by mild fat loss and mild muscle loss noted on nutrition focused physical exam.     Recommendations/Plan:  Nepro supplement drinks not on [facility] formulary, will provide appropriate substitute of Boost Glucose Control twice daily per pt request.   Encourage PO intake and document intake as % taken in ADL's to provide interdisciplinary communication across all shifts.   Provided measured wt weekly and obtain ht as feasible.   Nutrition rep will continue to see patient for ongoing meal and snack preferences.     RD to screen weekly for  adequate intake.

## 2023-05-19 NOTE — ASSESSMENT & PLAN NOTE
Secondary to suspected obstructive uropathy  Hydronephrosis  Nephrostomy tube placed on 5/18  Renal function returned to baseline

## 2023-05-19 NOTE — PROGRESS NOTES
[facility] -  PROGRESS NOTE               Author: Favian Evans M.D. Date & Time: 5/19/2023  1:12 PM     HPI:   was used.  Patient is a 72 year old female with a PMHx of DM, HTN, afib, HLD, CAD, hypothyroidism, CHF, ESRD s/p donor kidney transplant in October 2022 [facility].  She's been on Cellcept, tacrolimus, and prednisone, although she doesn't really know the name of her medications.  She states she's been urinating well but urinating more frequently.  She has been having multiple falls at home with generalized weakness, loss of appetite and n/v with RLQ pain.  She had an allograft ultrasound showing hydronephrosis and imaging was reviewed with Dr Rhodes from urology who thinks patient should have perc drain placement.  Had been on Eliquis so has to wait 48 hours.  She will be transferred to [facility].  Nephrology consulted to follow    DAILY NEPHROLOGY SUMMARY:  5/18 - Cr Trending down.  Sitting in chair.  Denies pain or Nausea.  5/19 - Got nephrostomy tube.  Urology following.  Denies pain.    REVIEW OF SYSTEMS:    10 point ROS reviewed and is as per HPI/daily summary or otherwise negative    PMH/PSH/SH/FH:   Reviewed and unchanged since admission note    CURRENT MEDICATIONS:   Reviewed from admission to present day    VS:  BP (!) 145/46   Pulse 67   Temp 36.7 °C (98.1 °F) (Temporal)   Resp 15   LMP  (LMP Unknown)   SpO2 93%   Breastfeeding No     Physical Exam  HENT:      Head: Normocephalic.      Right Ear: External ear normal.      Left Ear: External ear normal.      Nose: Nose normal.      Mouth/Throat:      Mouth: Mucous membranes are moist.   Eyes:      General: No scleral icterus.  Cardiovascular:      Rate and Rhythm: Normal rate.   Pulmonary:      Effort: Pulmonary effort is normal.   Abdominal:      Palpations: Abdomen is soft.   Musculoskeletal:         General: No swelling.   Skin:     General: Skin is warm.   Neurological:      General: No focal  deficit present.      Mental Status: She is alert.   Psychiatric:         Mood and Affect: Mood normal.      Fluids:  In: -   Out: 1000     LABS:  Recent Labs     05/17/23  1255 05/18/23  0202 05/19/23  0127   SODIUM 142 143 143   POTASSIUM 4.3 4.5 4.3   CHLORIDE 107 115* 111   CO2 23 16* 21   GLUCOSE 151* 73 76   BUN 53* 38* 30*   CREATININE 2.10* 1.61* 1.26   CALCIUM 10.1 9.3 9.3         IMAGING:   All imaging reviewed from admission to present day    IMPRESSION:  # SASHA with Hx renal transplant    - History of renal transplant at [facility] in Oct 2022    - Appears baseline cr fairly labile from 1.3-2.3 today 2.1    -  Imaging suggestive of obstructive uropathy, note that transplanted kidneys can sometimes have some evidence of hydro but urology thinks there is obstruction and recommends perc neph tube    - Possible pre-renal as improved with IVF, holding lasix  # Hydronephrosis    - Awaiting eliquis  # Paroxysmal afib  # CHF with preserved ejection fraction  # GERD  # Hypothyroidism  # HLD  # Anemia     PLAN  - Continue home immunosuppression medications  - Hold lasix for now  - Urology following  - Dose all meds per eGFR      Thank you for the consultation!

## 2023-05-20 VITALS
BODY MASS INDEX: 21.34 KG/M2 | SYSTOLIC BLOOD PRESSURE: 129 MMHG | WEIGHT: 125 LBS | RESPIRATION RATE: 15 BRPM | TEMPERATURE: 97.8 F | DIASTOLIC BLOOD PRESSURE: 55 MMHG | HEIGHT: 64 IN | OXYGEN SATURATION: 95 % | HEART RATE: 61 BPM

## 2023-05-20 LAB
ANION GAP SERPL CALC-SCNC: 12 MMOL/L (ref 7–16)
BUN SERPL-MCNC: 23 MG/DL (ref 8–22)
CALCIUM SERPL-MCNC: 9.3 MG/DL (ref 8.5–10.5)
CHLORIDE SERPL-SCNC: 109 MMOL/L (ref 96–112)
CO2 SERPL-SCNC: 18 MMOL/L (ref 20–33)
CREAT SERPL-MCNC: 1.21 MG/DL (ref 0.5–1.4)
ERYTHROCYTE [DISTWIDTH] IN BLOOD BY AUTOMATED COUNT: 48.1 FL (ref 35.9–50)
GFR SERPLBLD CREATININE-BSD FMLA CKD-EPI: 47 ML/MIN/1.73 M 2
GLUCOSE BLD STRIP.AUTO-MCNC: 92 MG/DL (ref 65–99)
GLUCOSE SERPL-MCNC: 79 MG/DL (ref 65–99)
HCT VFR BLD AUTO: 34 % (ref 37–47)
HGB BLD-MCNC: 10.6 G/DL (ref 12–16)
MCH RBC QN AUTO: 27.9 PG (ref 27–33)
MCHC RBC AUTO-ENTMCNC: 31.2 G/DL (ref 33.6–35)
MCV RBC AUTO: 89.5 FL (ref 81.4–97.8)
PLATELET # BLD AUTO: 103 K/UL (ref 164–446)
PMV BLD AUTO: 12 FL (ref 9–12.9)
POTASSIUM SERPL-SCNC: 4.3 MMOL/L (ref 3.6–5.5)
RBC # BLD AUTO: 3.8 M/UL (ref 4.2–5.4)
SODIUM SERPL-SCNC: 139 MMOL/L (ref 135–145)
WBC # BLD AUTO: 3.4 K/UL (ref 4.8–10.8)

## 2023-05-20 PROCEDURE — 85027 COMPLETE CBC AUTOMATED: CPT

## 2023-05-20 PROCEDURE — 700102 HCHG RX REV CODE 250 W/ 637 OVERRIDE(OP): Performed by: HOSPITALIST

## 2023-05-20 PROCEDURE — 700111 HCHG RX REV CODE 636 W/ 250 OVERRIDE (IP): Performed by: HOSPITALIST

## 2023-05-20 PROCEDURE — 36415 COLL VENOUS BLD VENIPUNCTURE: CPT

## 2023-05-20 PROCEDURE — 700111 HCHG RX REV CODE 636 W/ 250 OVERRIDE (IP): Performed by: STUDENT IN AN ORGANIZED HEALTH CARE EDUCATION/TRAINING PROGRAM

## 2023-05-20 PROCEDURE — A9270 NON-COVERED ITEM OR SERVICE: HCPCS | Performed by: HOSPITALIST

## 2023-05-20 PROCEDURE — 80048 BASIC METABOLIC PNL TOTAL CA: CPT

## 2023-05-20 PROCEDURE — 700102 HCHG RX REV CODE 250 W/ 637 OVERRIDE(OP): Performed by: GENERAL PRACTICE

## 2023-05-20 PROCEDURE — A9270 NON-COVERED ITEM OR SERVICE: HCPCS | Performed by: GENERAL PRACTICE

## 2023-05-20 PROCEDURE — 82962 GLUCOSE BLOOD TEST: CPT

## 2023-05-20 PROCEDURE — 99239 HOSP IP/OBS DSCHRG MGMT >30: CPT | Performed by: GENERAL PRACTICE

## 2023-05-20 RX ADMIN — MINOXIDIL 2.5 MG: 2.5 TABLET ORAL at 05:36

## 2023-05-20 RX ADMIN — PREDNISONE 5 MG: 5 TABLET ORAL at 05:34

## 2023-05-20 RX ADMIN — LEVOTHYROXINE SODIUM 75 MCG: 0.07 TABLET ORAL at 05:34

## 2023-05-20 RX ADMIN — AMLODIPINE BESYLATE 10 MG: 10 TABLET ORAL at 05:35

## 2023-05-20 RX ADMIN — Medication 100 MG: at 05:34

## 2023-05-20 RX ADMIN — APIXABAN 5 MG: 5 TABLET, FILM COATED ORAL at 05:35

## 2023-05-20 RX ADMIN — TACROLIMUS 1 MG: 1 CAPSULE ORAL at 05:35

## 2023-05-20 RX ADMIN — SODIUM BICARBONATE 1300 MG: 650 TABLET ORAL at 05:36

## 2023-05-20 RX ADMIN — MYCOPHENOLATE MOFETIL 250 MG: 250 CAPSULE ORAL at 05:36

## 2023-05-20 NOTE — DISCHARGE SUMMARY
Discharge Summary    CHIEF COMPLAINT ON ADMISSION  No chief complaint on file.      Reason for Admission  acute hydronephrosis of transplant*     Admission Date  5/17/2023    CODE STATUS  Full Code    HPI & HOSPITAL COURSE  This is a 72-year-old female with past medical history of hypertension, dyslipidemia, type 2 diabetes A1c of 6, paroxysmal atrial fibrillation on Eliquis, CAD with PCI x 2 in RCA, chronic diastolic heart failure, ESRD s/p renal transplant 10/2022 [facility], hypothyroidism, restless leg syndrome, and GERD who was transferred from [facility] with acute on chronic SASHA secondary to obstructive uropathy.    Ultrasound noted hydronephrosis of the transplanted kidney, case was discussed with urology with recommendations for percutaneous drain, placed on 5/18.    Case discussed with [facility] transplant center, agree with continued nephrostomy tube.  They will follow-up with patient outpatient to facilitate kidney biopsy.  Continue all immunosuppressive therapy, reviewed tacrolimus level which is adequate at 3.6.    Therefore, she is discharged in good and stable condition to home with close outpatient follow-up.    The patient met 2-midnight criteria for an inpatient stay at the time of discharge.    Discharge Date  5/20/2023    FOLLOW UP ITEMS POST DISCHARGE  Primary care physician  [facility]    DISCHARGE DIAGNOSES  Principal Problem:    Hydronephrosis (POA: Yes)  Active Problems:    Acute kidney injury superimposed on chronic kidney disease (HCC) (POA: Unknown)    Diabetes (HCC) (POA: Yes)      Overview: Patient is only on pioglitazone for her diabetes. She used to follow with       endocrinology but has not visited since December 2021    Mixed hyperlipidemia (POA: Yes)    Chronic heart failure with preserved ejection fraction (HCC) (POA: Yes)    ESRD s/p kidney transplant 10/31/22 (Chronic) (POA: Yes)      Overview: -Dialysis MWF      -follows with nephrology    Acquired hypothyroidism (POA: Yes)    Presence of  drug-eluting stent in right coronary artery (Chronic) (POA: Yes)    Secondary hypercoagulable state (HCC) (POA: Yes)    GERD (gastroesophageal reflux disease) (POA: Yes)    Permanent atrial fibrillation (HCC) (Chronic) (POA: Yes)    Long term current use of immunosuppressive drug (Chronic) (POA: Yes)  Resolved Problems:    * No resolved hospital problems. *      FOLLOW UP  Future Appointments   Date Time Provider Department Center   6/6/2023  3:00 PM Priyank Villar M.D. NEPH [facility]   6/7/2023 11:10 AM ANGELITA Guzmán ENCR [facility]   6/8/2023  4:00 PM ANGELITA ConcepcionMG [facility]   6/27/2023  4:00 PM ANGELITA Fisher CARCSHAYNA None   8/7/2023  4:45 PM V EXAM 4 VMED None     ANGELITA Concepcion  [address]  [address]  [facility] [address]  [phone]    Follow up        MEDICATIONS ON DISCHARGE     Medication List        CONTINUE taking these medications        Instructions   amLODIPine 10 MG Tabs  Commonly known as: NORVASC   Take 10 mg by mouth every day.  Dose: 10 mg     atorvastatin 20 MG Tabs  Commonly known as: LIPITOR   Take 1 Tablet by mouth every evening.  Dose: 20 mg     BD Pen Needle Yasmeen 2nd Gen  Generic drug: Insulin Pen Needle 32 G x 4 mm   USE AS DIRECTED WITH INSULIN PENS THREE TIMES DAILY BEFORE A MEAL     Eliquis 5mg Tabs  Generic drug: apixaban   Take 5 mg by mouth 2 times a day.  Dose: 5 mg     furosemide 40 MG Tabs  Commonly known as: LASIX   Take 1 Tablet by mouth 2 times a day.  Dose: 40 mg     Lancets   Doctor's comments: Or per formulary preference. ICD-10 code: E11.9 Controlled type 2 Diabetes Mellitus with long term insulin use  Use one Freestyle Pearl lancet to test blood sugar once daily .     Lantus SoloStar 100 UNIT/ML Sopn injection  Generic drug: insulin glargine   Inject 5 Units under the skin every day.  Dose: 5 Units     levothyroxine 75 MCG Tabs  Commonly known as: SYNTHROID   Take 1 Tablet by mouth every morning on an empty stomach.  Dose:  75 mcg     losartan 100 MG Tabs  Commonly known as: COZAAR   Take 1 Tablet by mouth every evening.  Dose: 100 mg     metoprolol SR 25 MG Tb24  Commonly known as: TOPROL XL   Take 25 mg by mouth every day.  Dose: 25 mg     minoxidil 2.5 MG Tabs  Commonly known as: LONITEN   Take 1 Tablet by mouth every day.  Dose: 2.5 mg     mycophenolate 250 MG Caps  Commonly known as: CELLCEPT   Take 1 Capsule by mouth 2 times a day.  Dose: 250 mg     NovoLOG FlexPen 100 UNIT/ML injection PEN  Generic drug: insulin aspart   Inject 4 Units under the skin 2 times a day. If BS>200=Pt takes 4 units of Insulin  Dose: 4 Units     ONE TOUCH ULTRA 2 w/Device Kit   1 DEVICE 3 TIMES A DAY BEFORE MEALS.     OneTouch Verio strip  Generic drug: glucose blood   1 Strip by Other route as needed (on insulin checking 3-4 times a day).  Dose: 1 Each     predniSONE 5 MG Tabs  Commonly known as: DELTASONE   Take 5 mg by mouth every day.  Dose: 5 mg     sodium bicarbonate 650 MG Tabs  Commonly known as: SODIUM BICARBONATE   Take 2 Tablets by mouth in the morning, at noon, and at bedtime.  Dose: 1,300 mg     tacrolimus 1 MG Caps  Commonly known as: PROGRAF   Take 5 mg by mouth 2 times a day.  Dose: 5 mg     thiamine 100 MG tablet  Commonly known as: THIAMINE   Take 1 Tablet by mouth every day.  Dose: 100 mg              Allergies  No Known Allergies    DIET  Orders Placed This Encounter   Procedures    Diet Order Diet: Renal     Standing Status:   Standing     Number of Occurrences:   1     Order Specific Question:   Diet:     Answer:   Renal [8]       ACTIVITY  As tolerated.  Weight bearing as tolerated    CONSULTATIONS  Neurology  Nephrology  IR    PROCEDURES  Nephrostomy tube placement    LABORATORY  Lab Results   Component Value Date    SODIUM 139 05/20/2023    POTASSIUM 4.3 05/20/2023    CHLORIDE 109 05/20/2023    CO2 18 (L) 05/20/2023    GLUCOSE 79 05/20/2023    BUN 23 (H) 05/20/2023    CREATININE 1.21 05/20/2023        Lab Results   Component  Value Date    WBC 3.4 (L) 05/20/2023    HEMOGLOBIN 10.6 (L) 05/20/2023    HEMATOCRIT 34.0 (L) 05/20/2023    PLATELETCT 103 (L) 05/20/2023      US-RENAL TRANSPLANT COMP   Final Result         1.  Elevated upper, mid, and lower pole resistive indices as described.   2.  Elevated upper, mid, and lower pole acceleration time as described.   3.  Minimal prominence of the renal pelvis, could represent extrarenal pelvis morphology or trace hydronephrosis.      IR-NEPHROSTOGRAM W/ NEW TUBE PLACEMENT (ALL RADIOLOGY) RIGHT   Final Result      1. ULTRASOUND AND FLUOROSCOPIC GUIDED PLACEMENT OF A RIGHT PELVIC RENAL TRANSPLANT KIDNEY 8 Indonesian  PIGTAIL LOCKING LOOP PERCUTANEOUS NEPHROSTOMY.      2. MODERATE HYDRONEPHROSIS OF RIGHT PELVIC RENAL TRANSLATION KIDNEY. PROBABLE DISTAL URETERAL OBSTRUCTION. FORMAL NEPHROSTOGRAM MAY BE OF INTEREST TO FURTHER EVALUATE FOR DISTAL URETERAL STENOSIS. A NONRADIOPAQUE OR FAINTLY CALCIFIED URETERAL CALCULUS IS    NOT EXCLUDED. CT MIGHT ALSO BE OF INTEREST.         Total time of the discharge process exceeds 45 minutes.

## 2023-05-20 NOTE — CARE PLAN
The patient is Stable - Low risk of patient condition declining or worsening    Shift Goals  Clinical Goals: drain maintenance  Patient Goals: rest    Progress made toward(s) clinical / shift goals:      Patient is not progressing towards the following goals:

## 2023-05-20 NOTE — PROGRESS NOTES
"[facility] -  PROGRESS NOTE               Author: ANGELITA Pandey Date & Time: 5/20/2023  12:44 PM     HPI:   was used.  Patient is a 72 year old female with a PMHx of DM, HTN, afib, HLD, CAD, hypothyroidism, CHF, ESRD s/p donor kidney transplant in October 2022 [facility].  She's been on Cellcept, tacrolimus, and prednisone, although she doesn't really know the name of her medications.  She states she's been urinating well but urinating more frequently.  She has been having multiple falls at home with generalized weakness, loss of appetite and n/v with RLQ pain.  She had an allograft ultrasound showing hydronephrosis and imaging was reviewed with Dr Rhodes from urology who thinks patient should have perc drain placement.  Had been on Eliquis so has to wait 48 hours.  She will be transferred to [facility].  Nephrology consulted to follow    DAILY NEPHROLOGY SUMMARY:  5/18 - Cr Trending down.  Sitting in chair.  Denies pain or Nausea.  5/19 - Got nephrostomy tube.  Urology following.  Denies pain.  5/20 - SCr 1.21 BUN 23 CO2 18 lytes stable. VSS no CP SOB Edema.  UOP 1.3L. She just discharged.     REVIEW OF SYSTEMS:    10 point ROS reviewed and is as per HPI/daily summary or otherwise negative    PMH/PSH/SH/FH:   Reviewed and unchanged since admission note    CURRENT MEDICATIONS:   Reviewed from admission to present day    VS:  /55   Pulse 61   Temp 36.6 °C (97.8 °F) (Temporal)   Resp 15   Ht 1.626 m (5' 4\")   Wt 56.7 kg (125 lb)   LMP  (LMP Unknown)   SpO2 95%   Breastfeeding No   BMI 21.46 kg/m²     Physical Exam       Fluids:  In: 720 [P.O.:720]  Out: 1350     LABS:  Recent Labs     05/18/23  0202 05/19/23  0127 05/20/23  0525   SODIUM 143 143 139   POTASSIUM 4.5 4.3 4.3   CHLORIDE 115* 111 109   CO2 16* 21 18*   GLUCOSE 73 76 79   BUN 38* 30* 23*   CREATININE 1.61* 1.26 1.21   CALCIUM 9.3 9.3 9.3         IMAGING:   All imaging reviewed " from admission to present day    IMPRESSION:  # SASHA with Hx renal transplant    - History of renal transplant at [facility] in Oct 2022    - Appears baseline cr fairly labile from 1.3-2.3 today 1.2    -  Imaging suggestive of obstructive uropathy, note that transplanted kidneys can sometimes have some evidence of hydro but urology thinks there is obstruction and recommends perc neph tube    - Possible pre-renal as improved with IVF, holding lasix  # Hydronephrosis    - Awaiting eliquis  # Paroxysmal afib  # CHF with preserved ejection fraction  # GERD  # Hypothyroidism  # HLD  # Anemia     PLAN  - Continue home immunosuppression medications  - Tac level prior to next visit  - Hold lasix for now  - Urology following  - Dose all meds per eGFR   - Follow up 1 week     Thank you for the consultation!

## 2023-05-21 LAB — GLUCOSE BLD STRIP.AUTO-MCNC: 118 MG/DL (ref 65–99)

## 2023-05-22 LAB
BACTERIA UR CULT: ABNORMAL
BACTERIA UR CULT: ABNORMAL
GRAM STN SPEC: ABNORMAL
SIGNIFICANT IND 70042: ABNORMAL
SITE SITE: ABNORMAL
SOURCE SOURCE: ABNORMAL

## 2023-05-23 LAB
BACTERIA BLD CULT: NORMAL
BACTERIA BLD CULT: NORMAL
SIGNIFICANT IND 70042: NORMAL
SIGNIFICANT IND 70042: NORMAL
SITE SITE: NORMAL
SITE SITE: NORMAL
SOURCE SOURCE: NORMAL
SOURCE SOURCE: NORMAL

## 2023-05-29 ENCOUNTER — HOSPITAL ENCOUNTER (OUTPATIENT)
Dept: LAB | Facility: MEDICAL CENTER | Age: 73
End: 2023-05-29
Attending: PHYSICIAN ASSISTANT
Payer: MEDICARE

## 2023-05-29 LAB
ANION GAP SERPL CALC-SCNC: 10 MMOL/L (ref 7–16)
BASOPHILS # BLD AUTO: 1 % (ref 0–1.8)
BASOPHILS # BLD: 0.03 K/UL (ref 0–0.12)
BUN SERPL-MCNC: 41 MG/DL (ref 8–22)
CALCIUM SERPL-MCNC: 10.5 MG/DL (ref 8.4–10.2)
CHLORIDE SERPL-SCNC: 108 MMOL/L (ref 96–112)
CO2 SERPL-SCNC: 23 MMOL/L (ref 20–33)
CREAT SERPL-MCNC: 1.66 MG/DL (ref 0.5–1.4)
EOSINOPHIL # BLD AUTO: 0.1 K/UL (ref 0–0.51)
EOSINOPHIL NFR BLD: 3 % (ref 0–6.9)
ERYTHROCYTE [DISTWIDTH] IN BLOOD BY AUTOMATED COUNT: 44.9 FL (ref 35.9–50)
FASTING STATUS PATIENT QL REPORTED: NORMAL
GFR SERPLBLD CREATININE-BSD FMLA CKD-EPI: 32 ML/MIN/1.73 M 2
GLUCOSE SERPL-MCNC: 97 MG/DL (ref 65–99)
HCT VFR BLD AUTO: 35.4 % (ref 37–47)
HGB BLD-MCNC: 11.2 G/DL (ref 12–16)
LYMPHOCYTES # BLD AUTO: 0.48 K/UL (ref 1–4.8)
LYMPHOCYTES NFR BLD: 15 % (ref 22–41)
MANUAL DIFF BLD: NORMAL
MCH RBC QN AUTO: 27.9 PG (ref 27–33)
MCHC RBC AUTO-ENTMCNC: 31.6 G/DL (ref 32.2–35.5)
MCV RBC AUTO: 88.3 FL (ref 81.4–97.8)
METAMYELOCYTES NFR BLD MANUAL: 1 %
MONOCYTES # BLD AUTO: 0.22 K/UL (ref 0–0.85)
MONOCYTES NFR BLD AUTO: 7 % (ref 0–13.4)
NEUTROPHILS # BLD AUTO: 2.34 K/UL (ref 1.82–7.42)
NEUTROPHILS NFR BLD: 70 % (ref 44–72)
NEUTS BAND NFR BLD MANUAL: 3 % (ref 0–10)
NRBC # BLD AUTO: 0 K/UL
NRBC BLD-RTO: 0 /100 WBC (ref 0–0.2)
OVALOCYTES BLD QL SMEAR: NORMAL
PHOSPHATE SERPL-MCNC: 2.6 MG/DL (ref 2.5–4.5)
PLATELET # BLD AUTO: 126 K/UL (ref 164–446)
PLATELET BLD QL SMEAR: NORMAL
PMV BLD AUTO: 12 FL (ref 9–12.9)
POIKILOCYTOSIS BLD QL SMEAR: NORMAL
POTASSIUM SERPL-SCNC: 4.4 MMOL/L (ref 3.6–5.5)
RBC # BLD AUTO: 4.01 M/UL (ref 4.2–5.4)
RBC BLD AUTO: PRESENT
SODIUM SERPL-SCNC: 141 MMOL/L (ref 135–145)
TACROLIMUS BLD-MCNC: 3.5 NG/ML (ref 5–20)
TARGETS BLD QL SMEAR: NORMAL
WBC # BLD AUTO: 3.2 K/UL (ref 4.8–10.8)

## 2023-05-29 PROCEDURE — 36415 COLL VENOUS BLD VENIPUNCTURE: CPT

## 2023-05-29 PROCEDURE — 85025 COMPLETE CBC W/AUTO DIFF WBC: CPT

## 2023-05-29 PROCEDURE — 84100 ASSAY OF PHOSPHORUS: CPT

## 2023-05-29 PROCEDURE — 85007 BL SMEAR W/DIFF WBC COUNT: CPT

## 2023-05-29 PROCEDURE — 80197 ASSAY OF TACROLIMUS: CPT

## 2023-05-29 PROCEDURE — 80048 BASIC METABOLIC PNL TOTAL CA: CPT

## 2023-06-02 ENCOUNTER — HOSPITAL ENCOUNTER (OUTPATIENT)
Dept: RADIOLOGY | Facility: MEDICAL CENTER | Age: 73
End: 2023-06-02
Payer: MEDICARE

## 2023-06-05 ENCOUNTER — HOSPITAL ENCOUNTER (OUTPATIENT)
Dept: LAB | Facility: MEDICAL CENTER | Age: 73
End: 2023-06-05
Attending: INTERNAL MEDICINE
Payer: MEDICARE

## 2023-06-05 LAB
ALBUMIN SERPL BCP-MCNC: 4.2 G/DL (ref 3.2–4.9)
ALBUMIN/GLOB SERPL: 1.6 G/DL
ALP SERPL-CCNC: 94 U/L (ref 30–99)
ALT SERPL-CCNC: 29 U/L (ref 2–50)
ANION GAP SERPL CALC-SCNC: 7 MMOL/L (ref 7–16)
APPEARANCE UR: CLEAR
AST SERPL-CCNC: 24 U/L (ref 12–45)
BASOPHILS # BLD AUTO: 0.9 % (ref 0–1.8)
BASOPHILS # BLD: 0.02 K/UL (ref 0–0.12)
BILIRUB SERPL-MCNC: 0.3 MG/DL (ref 0.1–1.5)
BILIRUB UR QL STRIP.AUTO: NEGATIVE
BUN SERPL-MCNC: 35 MG/DL (ref 8–22)
CALCIUM ALBUM COR SERPL-MCNC: 10 MG/DL (ref 8.5–10.5)
CALCIUM SERPL-MCNC: 10.2 MG/DL (ref 8.4–10.2)
CHLORIDE SERPL-SCNC: 109 MMOL/L (ref 96–112)
CO2 SERPL-SCNC: 23 MMOL/L (ref 20–33)
COLOR UR: YELLOW
CREAT SERPL-MCNC: 1.2 MG/DL (ref 0.5–1.4)
EOSINOPHIL # BLD AUTO: 0.04 K/UL (ref 0–0.51)
EOSINOPHIL NFR BLD: 1.7 % (ref 0–6.9)
EPI CELLS #/AREA URNS HPF: ABNORMAL /HPF
ERYTHROCYTE [DISTWIDTH] IN BLOOD BY AUTOMATED COUNT: 45.5 FL (ref 35.9–50)
GFR SERPLBLD CREATININE-BSD FMLA CKD-EPI: 48 ML/MIN/1.73 M 2
GLOBULIN SER CALC-MCNC: 2.6 G/DL (ref 1.9–3.5)
GLUCOSE SERPL-MCNC: 105 MG/DL (ref 65–99)
GLUCOSE UR STRIP.AUTO-MCNC: NEGATIVE MG/DL
HCT VFR BLD AUTO: 37.7 % (ref 37–47)
HGB BLD-MCNC: 11.8 G/DL (ref 12–16)
IMM GRANULOCYTES # BLD AUTO: 0.01 K/UL (ref 0–0.11)
IMM GRANULOCYTES NFR BLD AUTO: 0.4 % (ref 0–0.9)
KETONES UR STRIP.AUTO-MCNC: NEGATIVE MG/DL
LEUKOCYTE ESTERASE UR QL STRIP.AUTO: ABNORMAL
LYMPHOCYTES # BLD AUTO: 0.68 K/UL (ref 1–4.8)
LYMPHOCYTES NFR BLD: 28.9 % (ref 22–41)
MCH RBC QN AUTO: 27.9 PG (ref 27–33)
MCHC RBC AUTO-ENTMCNC: 31.3 G/DL (ref 32.2–35.5)
MCV RBC AUTO: 89.1 FL (ref 81.4–97.8)
MICRO URNS: ABNORMAL
MONOCYTES # BLD AUTO: 0.35 K/UL (ref 0–0.85)
MONOCYTES NFR BLD AUTO: 14.9 % (ref 0–13.4)
NEUTROPHILS # BLD AUTO: 1.25 K/UL (ref 1.82–7.42)
NEUTROPHILS NFR BLD: 53.2 % (ref 44–72)
NITRITE UR QL STRIP.AUTO: NEGATIVE
NRBC # BLD AUTO: 0 K/UL
NRBC BLD-RTO: 0 /100 WBC (ref 0–0.2)
PH UR STRIP.AUTO: 7 [PH] (ref 5–8)
PLATELET # BLD AUTO: 131 K/UL (ref 164–446)
PMV BLD AUTO: 11.3 FL (ref 9–12.9)
POTASSIUM SERPL-SCNC: 4.4 MMOL/L (ref 3.6–5.5)
PROT SERPL-MCNC: 6.8 G/DL (ref 6–8.2)
PROT UR QL STRIP: NEGATIVE MG/DL
RBC # BLD AUTO: 4.23 M/UL (ref 4.2–5.4)
RBC # URNS HPF: ABNORMAL /HPF
RBC UR QL AUTO: ABNORMAL
SODIUM SERPL-SCNC: 139 MMOL/L (ref 135–145)
SP GR UR STRIP.AUTO: 1.01
TACROLIMUS BLD-MCNC: 3 NG/ML (ref 5–20)
WBC # BLD AUTO: 2.4 K/UL (ref 4.8–10.8)
WBC #/AREA URNS HPF: ABNORMAL /HPF

## 2023-06-05 PROCEDURE — 85025 COMPLETE CBC W/AUTO DIFF WBC: CPT

## 2023-06-05 PROCEDURE — 36415 COLL VENOUS BLD VENIPUNCTURE: CPT

## 2023-06-05 PROCEDURE — 80197 ASSAY OF TACROLIMUS: CPT

## 2023-06-05 PROCEDURE — 87799 DETECT AGENT NOS DNA QUANT: CPT | Mod: 91

## 2023-06-05 PROCEDURE — 81001 URINALYSIS AUTO W/SCOPE: CPT

## 2023-06-05 PROCEDURE — 80053 COMPREHEN METABOLIC PANEL: CPT

## 2023-06-06 ENCOUNTER — OFFICE VISIT (OUTPATIENT)
Dept: NEPHROLOGY | Facility: MEDICAL CENTER | Age: 73
End: 2023-06-06
Payer: MEDICARE

## 2023-06-06 VITALS
RESPIRATION RATE: 18 BRPM | WEIGHT: 125 LBS | DIASTOLIC BLOOD PRESSURE: 80 MMHG | SYSTOLIC BLOOD PRESSURE: 140 MMHG | HEART RATE: 62 BPM | HEIGHT: 63 IN | OXYGEN SATURATION: 97 % | BODY MASS INDEX: 22.15 KG/M2 | TEMPERATURE: 97.4 F

## 2023-06-06 DIAGNOSIS — Z79.899 LONG TERM CURRENT USE OF IMMUNOSUPPRESSIVE DRUG: Chronic | ICD-10-CM

## 2023-06-06 DIAGNOSIS — Z94.0 KIDNEY TRANSPLANT RECIPIENT: Chronic | ICD-10-CM

## 2023-06-06 DIAGNOSIS — Z86.39 HISTORY OF VITAMIN D DEFICIENCY: ICD-10-CM

## 2023-06-06 DIAGNOSIS — N18.6 ESRD (END STAGE RENAL DISEASE) (HCC): Chronic | ICD-10-CM

## 2023-06-06 DIAGNOSIS — N13.39 OTHER HYDRONEPHROSIS: ICD-10-CM

## 2023-06-06 DIAGNOSIS — D61.818 PANCYTOPENIA (HCC): Chronic | ICD-10-CM

## 2023-06-06 DIAGNOSIS — N18.31 STAGE 3A CHRONIC KIDNEY DISEASE: ICD-10-CM

## 2023-06-06 DIAGNOSIS — I15.0 RENOVASCULAR HYPERTENSION: ICD-10-CM

## 2023-06-06 PROBLEM — N13.30 HYDRONEPHROSIS: Status: RESOLVED | Noted: 2023-05-17 | Resolved: 2023-06-06

## 2023-06-06 PROCEDURE — 3077F SYST BP >= 140 MM HG: CPT | Performed by: INTERNAL MEDICINE

## 2023-06-06 PROCEDURE — 3079F DIAST BP 80-89 MM HG: CPT | Performed by: INTERNAL MEDICINE

## 2023-06-06 PROCEDURE — 99214 OFFICE O/P EST MOD 30 MIN: CPT | Performed by: INTERNAL MEDICINE

## 2023-06-06 RX ORDER — FUROSEMIDE 40 MG/1
40 TABLET ORAL DAILY
Qty: 90 TABLET | Refills: 3 | Status: SHIPPED | OUTPATIENT
Start: 2023-06-06 | End: 2023-09-22

## 2023-06-06 ASSESSMENT — FIBROSIS 4 INDEX: FIB4 SCORE: 2.48

## 2023-06-06 ASSESSMENT — ENCOUNTER SYMPTOMS
FEVER: 0
SHORTNESS OF BREATH: 0
ABDOMINAL PAIN: 0

## 2023-06-06 NOTE — PROGRESS NOTES
Chief Complaint   Patient presents with    Follow-Up     ESRD, s/p kidney transplant., [facility] labs       CC: f/u CKD and transplant  She is with son, Ronny. They decline telephone .       HPI:  Tere Gallegos is a 72 y.o. female with a history of end-stage renal disease status post  donor kidney [place] [facility] notable for delayed graft function requiring dialysis until mid 2022, also complicated by a distal ureteral stricture requiring percutaneous transplant nephrostomy followed by ureteral stenting in May 2023, diabetes, hypertension, permanent atrial fibrillation, pancytopenia, BK viremia who presents today for follow-up.    Patient was hospitalized in mid May 2023.  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  She required percutaneous nephrostomy on 2023.  She was then hospitalized at [facility] in [place] in late May, 2023. She had PCN removed and ureteral stent placed. Per transplant nephrology, plan is for repeat imaging in 4 to 6 weeks to see if ureteral stricture improves, and potential need for ureteral revision in the future.    Re: ESRD, patient was on dialysis since .  The etiology of ESRD was thought to be due to diabetes, hypertension, and solitary kidney.  Patient was anuric prior to kidney transplant 10/31/2022.  Patient had delayed graft function and required dialysis until 2022.  Posttransplant course has been complicated by pancytopenia and BK viremia and BK viruria.  Creatinine and GFR has stabilized with ureteral stent.     Re: immunosuppression, patient was induced with Simulect on 10/31/2022.  Patient was originally on mycophenolate 1000 mg p.o. twice daily, tacrolimus 1 mg p.o. twice daily, and prednisone 10 mg daily shortly after transplant.  Her dosages were weaned down.  She was seen by nephrology during hospitalization in 2023, and it was recommended that she continue immunosuppression, but she was  discharged off of tacrolimus and mycophenolate. She is taking prednisone 5mg daily, Tacrolimus 1mg in morning and 2mg in evening, and MMF 250mg BID.     Re: hypertension, she complains of LE edema, started in March. She is checking BP at home, and is usually high 140-150 systolic.       Past Medical History:   Diagnosis Date    Acquired hypothyroidism 2020    CAD (coronary artery disease)     Chronic diastolic heart failure ([facility]) 2020    Coronary artery disease due to lipid rich plaque     2 Synergy NOEMI to 100% RCA stent placed    Dental disorder     partial dentures- uppers    Diabetes ([facility])     oral medication    ESRD (end stage renal disease) on dialysis ([facility]) 2020    Hemodialysis patient ([facility])     M, W, F    Hyperlipidemia     Hypertension     Kidney transplant candidate     Kidney transplant recipient 10/31/2022    Presence of drug-eluting stent in right coronary artery     QT prolongation 2020    RLS (restless legs syndrome) 2016    Transaminitis 2018     Past Surgical History:   Procedure Laterality Date    OTHER ABDOMINAL SURGERY Right 10/31/2022     Donor kidney transplant - [facility]    Z CARDIAC CATH  2016    RCA stented with 2 Synergy drug-eluting stents.    RECOVERY  2016    Procedure: CATH LAB [facility] WITH POSSIBLE DR. CASTILLO;  Surgeon: [facility];  Location: SURGERY PRE-POST PROC UNIT [facility];  Service:     ZZZ CARDIAC CATH  2016    100% RCA    OTHER Left     left arm upper extremity fistula    OTHER ABDOMINAL SURGERY      left kidney removed due to cancer         Outpatient Encounter Medications as of 2023   Medication Sig Dispense Refill    tacrolimus (PROGRAF) 1 MG Cap Take 5 mg by mouth 2 times a day.      mycophenolate (CELLCEPT) 250 MG Cap Take 1 Capsule by mouth 2 times a day. 20 Capsule 0    sodium bicarbonate (SODIUM BICARBONATE) 650 MG Tab Take 2 Tablets by mouth in the morning, at noon, and at  bedtime. 120 Tablet 0    levothyroxine (SYNTHROID) 75 MCG Tab Take 1 Tablet by mouth every morning on an empty stomach. 90 Tablet 4    BD PEN NEEDLE PETE 2ND GEN USE AS DIRECTED WITH INSULIN PENS THREE TIMES DAILY BEFORE A MEAL      insulin glargine (LANTUS SOLOSTAR) 100 UNIT/ML Solution Pen-injector injection Inject 5 Units under the skin every day.      losartan (COZAAR) 100 MG Tab Take 1 Tablet by mouth every evening. 90 Tablet 3    glucose blood (ONETOUCH VERIO) strip 1 Strip by Other route as needed (on insulin checking 3-4 times a day). 150 Strip 6    Blood Glucose Monitoring Suppl (ONE TOUCH ULTRA 2) w/Device Kit 1 DEVICE 3 TIMES A DAY BEFORE MEALS. 1 Kit 0    atorvastatin (LIPITOR) 20 MG Tab Take 1 Tablet by mouth every evening. 100 Tablet 3    Lancets Use one Freestyle Pearl lancet to test blood sugar once daily . 300 Each 3    insulin aspart (NOVOLOG FLEXPEN) 100 UNIT/ML injection PEN Inject 4 Units under the skin 2 times a day. If BS>200=Pt takes 4 units of Insulin      predniSONE (DELTASONE) 5 MG Tab Take 5 mg by mouth every day.      apixaban (ELIQUIS) 5mg Tab Take 5 mg by mouth 2 times a day. (Patient not taking: Reported on 6/6/2023)      furosemide (LASIX) 40 MG Tab Take 1 Tablet by mouth 2 times a day. (Patient not taking: Reported on 6/6/2023) 180 Tablet 3    minoxidil (LONITEN) 2.5 MG Tab Take 1 Tablet by mouth every day. (Patient not taking: Reported on 6/6/2023) 30 Tablet 0    thiamine (THIAMINE) 100 MG tablet Take 1 Tablet by mouth every day. (Patient not taking: Reported on 6/6/2023) 30 Tablet 0    metoprolol SR (TOPROL XL) 25 MG TABLET SR 24 HR Take 25 mg by mouth every day. (Patient not taking: Reported on 6/6/2023)      amLODIPine (NORVASC) 10 MG Tab Take 10 mg by mouth every day. (Patient not taking: Reported on 6/6/2023)       No facility-administered encounter medications on file as of 6/6/2023.        No Known Allergies    Review of Systems   Constitutional:  Negative for fever.  "  Respiratory:  Negative for shortness of breath.    Cardiovascular:  Positive for chest pain (gets better tylenol).   Gastrointestinal:  Negative for abdominal pain.   Genitourinary:  Negative for dysuria.   All other systems reviewed and are negative.      BP (!) 140/80 (BP Location: Right arm, Patient Position: Sitting, BP Cuff Size: Adult)   Pulse 62   Temp 36.3 °C (97.4 °F) (Temporal)   Resp 18   Ht 1.6 m (5' 3\")   Wt 56.7 kg (125 lb)   LMP  (LMP Unknown)   SpO2 97%   BMI 22.14 kg/m²     Physical Exam  Constitutional:       General: She is not in acute distress.  HENT:      Mouth/Throat:      Pharynx: No oropharyngeal exudate.   Eyes:      General: No scleral icterus.  Neck:      Trachea: No tracheal deviation.   Cardiovascular:      Rate and Rhythm: Normal rate and regular rhythm.      Heart sounds: Murmur heard.   Pulmonary:      Effort: Pulmonary effort is normal.      Breath sounds: Normal breath sounds. No stridor. No rales.   Abdominal:      General: Bowel sounds are normal.      Palpations: Abdomen is soft.      Tenderness: There is no abdominal tenderness.   Musculoskeletal:         General: Normal range of motion.      Cervical back: Neck supple.      Right lower leg: Edema (1+) present.      Left lower leg: Edema (1+) present.   Skin:     General: Skin is warm and dry.      Findings: No rash.   Neurological:      General: No focal deficit present.      Mental Status: She is alert and oriented to person, place, and time.   Psychiatric:         Mood and Affect: Mood and affect normal.         Behavior: Behavior normal.   Dialysis access: Left brachiobasilic AV fistula, with patent bruit and thrill.         Labs reviewed.  Recent Labs     12/19/22  0810 12/20/22  0430 01/23/23  0848 01/30/23  0853 02/18/23  0417 02/19/23  0059 05/01/23  0657 05/15/23  0707 05/17/23  1255 05/18/23  0202 05/20/23  0525 05/24/23  2117 05/26/23  0431 05/27/23  0513 05/29/23  0705 06/05/23  0712   ALBUMIN 4.4   < > " 4.3   < > 3.6   < > 4.2  --  4.5  --   --   --   --   --   --  4.2   HDL 39*  --  45  --  31*  --   --   --   --   --   --   --   --   --   --   --    TRIGLYCERIDE 177*  --  109  --  144  --   --   --   --   --   --   --   --   --   --   --    SODIUM 140   < > 138   < > 139   < > 141   < > 142   < > 139  --   --   --  141 139   POTASSIUM 4.4   < > 4.9   < > 3.4*   < > 3.6   < > 4.3   < > 4.3  --   --   --  4.4 4.4   CHLORIDE 108   < > 108   < > 110   < > 105   < > 107   < > 109  --   --   --  108 109   CO2 21   < > 20   < > 18*   < > 22   < > 23   < > 18*  --   --   --  23 23   BUN 22   < > 26*   < > 25*   < > 35*   < > 53*   < > 23*  --   --   --  41* 35*   CREATININE 1.07   < > 1.00   < > 0.87   < > 2.08*   < > 2.10*   < > 1.21  --   --   --  1.66* 1.20   PHOSPHORUS  --    < >  --    < > 2.8   < >  --    < > 3.1  --   --    < > 3.4 2.9 2.6  --     < > = values in this interval not displayed.         Lab Results   Component Value Date/Time    WBC 2.4 (L) 06/05/2023 07:12 AM    RBC 4.23 06/05/2023 07:12 AM    HEMOGLOBIN 11.8 (L) 06/05/2023 07:12 AM    HEMATOCRIT 37.7 06/05/2023 07:12 AM    MCV 89.1 06/05/2023 07:12 AM    MCH 27.9 06/05/2023 07:12 AM    MCHC 31.3 (L) 06/05/2023 07:12 AM    MPV 11.3 06/05/2023 07:12 AM      Recent Labs     06/05/23 0712   WBC 2.4*   RBC 4.23   HEMOGLOBIN 11.8*   HEMATOCRIT 37.7   MCV 89.1   MCH 27.9   MCHC 31.3*   RDW 45.5   PLATELETCT 131*   MPV 11.3       Recent Labs     06/05/23  0712   SODIUM 139   POTASSIUM 4.4   CHLORIDE 109   CO2 23   GLUCOSE 105*   BUN 35*   CREATININE 1.20   CALCIUM 10.2       URINALYSIS:  Lab Results   Component Value Date/Time    COLORURINE Yellow 06/05/2023 0712    CLARITY Clear 06/05/2023 0712    SPECGRAVITY 1.010 06/05/2023 0712    PHURINE 7.0 06/05/2023 0712    KETONES Negative 06/05/2023 0712    PROTEINURIN Negative 06/05/2023 0712    BILIRUBINUR Negative 06/05/2023 0712    UROBILU 0.2 04/01/2023 1616    NITRITE Negative 06/05/2023 0712    LEUKESTERAS  Small (A) 2023 0712    OCCULTBLOOD Trace (A) 2023 0712       [facility] [place]  No results found for: TOTPROTUR No results found for: CREATININEU      Imaging report(s) reviewed  No orders to display         Assessment:  Tere Gallegos is a 72 y.o. female with a history of end-stage renal disease status post  donor kidney [place] [facility] notable for delayed graft function requiring dialysis until mid 2022, also complicated by a distal ureteral stricture requiring percutaneous transplant nephrostomy in May 2023, diabetes, hypertension, permanent atrial fibrillation, pancytopenia, BK viremia who presents today for follow-up.    Plan:  1. ESRD s/p kidney transplant 10/31/22  -Original ESRD diagnosis from solitary kidney, diabetes, hypertension.  Started hemodialysis in .  Patient remains with functioning kidney transplant.  She remains off of dialysis since 2022.      2.  SASHA on transplant stage 3b chronic kidney disease (HCC)  -Recent SASHA in May 2023 due to distal ureter stricture and hydronephrosis.  Patient now has transplant ureteral stent in place.  Continue to follow with transplant nephrology and urology at [facility] in [place].  I explained the importance of blood pressure and glycemic control to help maintain good kidney function.  Continue losartan for long-term kidney protection.  Recommend low-sodium diet.  Avoid NSAIDs and other nephrotoxins.    3.  Hypertension  - Uncontrolled.  Patient with some lower extremity edema swelling.  She is currently off of Lasix for unknown reason.  Recommend resume Lasix 40 mg p.o. daily.  Maintain losartan for long-term kidney protection.      4. Permanent atrial fibrillation (HCC)  -Currently off of apixaban due to recent hematuria with percutaneous nephrostomy and ureteral stenting.  Defer to primary care and cardiology if and when patient should resume anticoagulation.    5. Long term current use of  immunosuppressive drug complicated by BK viremia  -Patient stable on prednisone 5 mg daily, tacrolimus 1 mg in the morning and 2 mg in the evening, mycophenolate 250 mg p.o. twice daily.  She has a history of BK viremia.  Continue to monitor BK viruria and viremia.  We will continue to coordinate with transplant nephrology to manage immunosuppression.    6. Pancytopenia (HCC)  -Persistent.  If this worsens, she might need lower dose of mycophenolate such as 250 mg just once daily.  I will continue to coordinate with transplant nephrology at [facility] in [place].    7.  Left arm AV fistula swelling  - Seems to have improved.  If worsens again, I would recommend referral to interventional nephrology.    Recommend at least standing monthly labs.  Return to clinic 8-10 weeks with pre-clinic labs, and bring all home medications and blood pressure log.    Priyank Villar MD  Nephrology  [facility]

## 2023-06-07 ENCOUNTER — OFFICE VISIT (OUTPATIENT)
Dept: ENDOCRINOLOGY | Facility: MEDICAL CENTER | Age: 73
End: 2023-06-07
Payer: MEDICARE

## 2023-06-07 VITALS
HEART RATE: 50 BPM | OXYGEN SATURATION: 98 % | WEIGHT: 126.3 LBS | HEIGHT: 64 IN | BODY MASS INDEX: 21.56 KG/M2 | SYSTOLIC BLOOD PRESSURE: 126 MMHG | DIASTOLIC BLOOD PRESSURE: 52 MMHG

## 2023-06-07 DIAGNOSIS — G62.9 NEUROPATHY: ICD-10-CM

## 2023-06-07 DIAGNOSIS — E78.5 DYSLIPIDEMIA: ICD-10-CM

## 2023-06-07 DIAGNOSIS — E11.65 CONTROLLED TYPE 2 DIABETES MELLITUS WITH HYPERGLYCEMIA, WITH LONG-TERM CURRENT USE OF INSULIN (HCC): ICD-10-CM

## 2023-06-07 DIAGNOSIS — E55.9 VITAMIN D DEFICIENCY: ICD-10-CM

## 2023-06-07 DIAGNOSIS — E03.9 HYPOTHYROIDISM, ACQUIRED: ICD-10-CM

## 2023-06-07 DIAGNOSIS — I10 ESSENTIAL HYPERTENSION: ICD-10-CM

## 2023-06-07 DIAGNOSIS — Z79.4 CONTROLLED TYPE 2 DIABETES MELLITUS WITH HYPERGLYCEMIA, WITH LONG-TERM CURRENT USE OF INSULIN (HCC): ICD-10-CM

## 2023-06-07 DIAGNOSIS — Z94.0 KIDNEY TRANSPLANT STATUS: ICD-10-CM

## 2023-06-07 DIAGNOSIS — I25.10 CARDIOVASCULAR DISEASE: ICD-10-CM

## 2023-06-07 LAB
BK PLASMA INTERP, QNT NAAT NL11711: DETECTED
BK PLASMA IU/ML, QNT NAAT NL11709: 1840 IU/ML
BK PLASMA LOG IU/ML, QNT NAAT NL11710: 3.26 LOG IU/ML
BK UR INTERP, QNT NAAT NL11708: DETECTED
BK UR IU/ML, QNT NAAT NL11706: ABNORMAL IU/ML
BK UR LOG IU/ML, QNT NAAT NL11707: 6.72 LOG IU/ML

## 2023-06-07 PROCEDURE — 99212 OFFICE O/P EST SF 10 MIN: CPT

## 2023-06-07 PROCEDURE — 3074F SYST BP LT 130 MM HG: CPT

## 2023-06-07 PROCEDURE — 99214 OFFICE O/P EST MOD 30 MIN: CPT

## 2023-06-07 PROCEDURE — 3078F DIAST BP <80 MM HG: CPT

## 2023-06-07 RX ORDER — METOPROLOL SUCCINATE 25 MG/1
25 TABLET, EXTENDED RELEASE ORAL DAILY
Status: ON HOLD | COMMUNITY
End: 2023-09-06

## 2023-06-07 RX ORDER — ACYCLOVIR 400 MG/1
1 TABLET ORAL
Qty: 9 EACH | Refills: 11 | Status: SHIPPED | OUTPATIENT
Start: 2023-06-07 | End: 2023-09-04

## 2023-06-07 ASSESSMENT — FIBROSIS 4 INDEX: FIB4 SCORE: 2.48

## 2023-06-07 NOTE — PROGRESS NOTES
"Chief Complaint: Follow-up on the following conditions  HPI:   Tere Gallegos is a 72 y.o. female    1. Controlled type 2 diabetes mellitus with hyperglycemia:   Type 2 Diabetes Mellitus diagnosed in 20 years ago.      She checks her blood sugars 3x/day  Fasting blood sugars-95      POC a1c on 6/7/2023 at 6.2%  POC a1c on 02/18/2023 6.0%  POC a1c on 1/03/2023 at 5.8%  Last A1C on 11/8/21 at 5.7%, she was a dialysis patient which makes A1c unreliable.    Diabetes management:  Lantus 5 units-  Novolg 4 units if BG >200 2 units before each meal plus a sliding scale  151-200  1 unit  200-250 2 units  2  3 units  301-350 4 units  351-400 5 units  401-450 6 units    She denies hypoglycemic episodes.    She  denies hypoglycemic unawareness.   She denies episodes of severe hypoglycemia requiring third party assistance.      Diet: \"healthy\" diet  in general  Exercise: minimal     Diabetes Complications   She  reports history of mild proliferative diabetic retinopathy.    She denies laser eye surgery.   Cataract surgery both eyes were done this year   Last eye exam: Dr. Larose in July appt of 2023  Opthlmology appt is on January 10th     Latest Reference Range & Units 01/23/23 08:48   Fructosamine 205 - 285 umol/L 259      Latest Reference Range & Units 11/12/19 12:47   C-Peptide 0.8 - 3.5 ng/mL 15.2 (H)      Latest Reference Range & Units 11/12/19 12:47   MAN Antibody 0.0 - 5.0 IU/mL <5.0     2.  Neuropathy:   She denies numbness, to both arms bilaterally when she is asleep he wakes her up from sleep-This  resolves within a few minutes with arm movement.   Denies numbness or tingling to her feet but reports pain occasionally she is currently taking Lyrica.  she denies history of foot sores.     3.  Kidney transplant status:  Surgery in [facility] in 10/31/22   Currently taking Losartan  Dr. Blackmon nephrology     Latest Reference Range & Units 01/23/23 08:49   Micro Alb Creat Ratio 0 - 30 mg/g see below "   Creatinine, Urine mg/dL 69.97   Microalbumin, Urine Random mg/dL <1.2      Latest Reference Range & Units 06/05/23 07:12   GFR (CKD-EPI) >60 mL/min/1.73 m 2 48 !     4.  Cardiovascular disease:  Paroxysmal Atrial Fibrillation-She is taking Eliquis.   She is seeing at [facility]-Aster Santamaria     5.  Hypothyroidism, acquired:   She is currently levothyroixine 75 mcg of levothyroxine daily   She takes it every morning on an empty stomach  Denies taking any iron, calcium, multivitamins, antiacids with the medication    Denies fatigue, constipation, dry skin, palpitations.      Latest Reference Range & Units 02/18/23 04:17   TSH 0.380 - 5.330 uIU/mL 9.010 (H)   Free T-4 0.93 - 1.70 ng/dL 1.25     6. Essential Hypertension:  FV by cardiology   Currently taking Norvasc 10 mg, carvedilol 25 mg, lisinopril 40 mg  BP in office today was 126/52  Denies any dizziness, palpitations, chest pain    7.  Dyslipidemia:  She is currently taking statin-atorvastatin 20 mg daily   She still complaining of muscle aches especially in her lower legs   Latest Reference Range & Units 02/18/23 04:17   Cholesterol,Tot 100 - 199 mg/dL 97 (L)   Triglycerides 0 - 149 mg/dL 144   HDL >=40 mg/dL 31 !   LDL <100 mg/dL 37       8. Vitamin D deficiency:  Currently not taking any vitamin D  Fv by Dr. Blackmon    Latest Reference Range & Units 01/23/23 08:48   25-Hydroxy   Vitamin D 25 30 - 100 ng/mL 16 (L)       ROS:     CONS:     No fever, no chills, no weight loss, no fatigue   EYES:      No diplopia, no blurry vision, no redness of eyes, no swelling of eyelids   ENT:    No hearing loss, No ear pain, No sore throat, no dysphagia, no neck swelling   CV:     No chest pain, no palpitations, no claudication, no orthopnea, no PND   PULM:    No SOB, no cough, no hemoptysis, no wheezing    GI:   No nausea, no vomiting, no diarrhea, no constipation, no bloody stools   :  Passing urine well, no dysuria, no hematuria    ENDO:   No polyuria, no polydipsia, no heat intolerance, no cold intolerance   NEURO: No headaches, no dizziness, no convulsions, no tremors   MUSC:  No joint swellings, no arthralgias, no myalgias, no weakness   SKIN:   No rash, no ulcers, no dry skin   PSYCH:   No depression, no anxiety, no difficulty sleeping       Past Medical History:  Patient Active Problem List    Diagnosis Date Noted    Acute kidney injury superimposed on chronic kidney disease ([facility]) 05/19/2023    Acute renal failure superimposed on chronic kidney disease, unspecified CKD stage, unspecified acute renal failure type ([facility]) 05/17/2023    Other hydronephrosis 05/17/2023    Long term current use of immunosuppressive drug 04/25/2023    Leg swelling 04/20/2023    Anemia due to stage 3b chronic kidney disease ([facility]) 04/02/2023    Permanent atrial fibrillation ([facility]) 04/01/2023    Multifocal pneumonia 03/31/2023    Stage 3a chronic kidney disease ([facility]) 03/31/2023    Drug-induced constipation 03/09/2023    Chronic anticoagulation 12/27/2022    Type 2 diabetes mellitus ([facility]) 12/19/2022    Long-term insulin use ([facility]) 12/08/2022    Kidney transplant recipient 11/09/2022    Lung nodules 10/22/2022    GERD (gastroesophageal reflux disease) 10/19/2022    Normocytic anemia 08/25/2022    Secondary hypercoagulable state ([facility]) 04/04/2022    Chronic diastolic CHF (congestive heart failure) ([facility]) 01/25/2022    Paroxysmal A-fib ([facility]) 08/11/2021    Pancytopenia ([facility]) 11/18/2020    Presence of drug-eluting stent in right coronary artery     Chronic heart failure with preserved ejection fraction ([facility]) 05/04/2020    ESRD s/p kidney transplant 10/31/22 05/04/2020    Acquired hypothyroidism 05/04/2020    Dental disorder 05/04/2020    Coronary artery disease with angina pectoris with documented spasm ([facility])     Mixed hyperlipidemia 11/12/2019    RLS (restless legs syndrome) 08/05/2016    Diabetes ([facility]) 08/05/2016    Renovascular hypertension 09/17/2013       Past Surgical  History:  Past Surgical History:   Procedure Laterality Date    OTHER ABDOMINAL SURGERY Right 10/31/2022     Donor kidney transplant - [facility] CARDIAC CATH  2016    RCA stented with 2 Synergy drug-eluting stents.    RECOVERY  2016    Procedure: CATH LAB [facility] WITH POSSIBLE DR. CASTILLO;  Surgeon: Recoveryonly Surgery;  Location: SURGERY PRE-POST PROC UNIT [facility];  Service:     [facility] CARDIAC CATH  2016    100% RCA    OTHER Left 2014    left arm upper extremity fistula    OTHER ABDOMINAL SURGERY      left kidney removed due to cancer        Allergies:  Patient has no known allergies.     Current Medications:    Current Outpatient Medications:     furosemide (LASIX) 40 MG Tab, Take 1 Tablet by mouth every day., Disp: 90 Tablet, Rfl: 3    apixaban (ELIQUIS) 2.5mg Tab, Take 2.5 mg by mouth., Disp: , Rfl:     tacrolimus (PROGRAF) 1 MG Cap, Take 2 mg by mouth 2 times a day. Take 1mg in morning and 2mg in evening., Disp: , Rfl:     mycophenolate (CELLCEPT) 250 MG Cap, Take 1 Capsule by mouth 2 times a day., Disp: 20 Capsule, Rfl: 0    sodium bicarbonate (SODIUM BICARBONATE) 650 MG Tab, Take 2 Tablets by mouth in the morning, at noon, and at bedtime., Disp: 120 Tablet, Rfl: 0    thiamine (THIAMINE) 100 MG tablet, Take 1 Tablet by mouth every day. (Patient not taking: Reported on 2023), Disp: 30 Tablet, Rfl: 0    levothyroxine (SYNTHROID) 75 MCG Tab, Take 1 Tablet by mouth every morning on an empty stomach., Disp: 90 Tablet, Rfl: 4    BD PEN NEEDLE PETE 2ND GEN, USE AS DIRECTED WITH INSULIN PENS THREE TIMES DAILY BEFORE A MEAL, Disp: , Rfl:     insulin glargine (LANTUS SOLOSTAR) 100 UNIT/ML Solution Pen-injector injection, Inject 5 Units under the skin every day., Disp: , Rfl:     amLODIPine (NORVASC) 10 MG Tab, Take 10 mg by mouth every day. (Patient not taking: Reported on 2023), Disp: , Rfl:     losartan (COZAAR) 100 MG Tab, Take 1 Tablet by mouth every evening.,  "Disp: 90 Tablet, Rfl: 3    glucose blood (ONETOUCH VERIO) strip, 1 Strip by Other route as needed (on insulin checking 3-4 times a day)., Disp: 150 Strip, Rfl: 6    Blood Glucose Monitoring Suppl (ONE TOUCH ULTRA 2) w/Device Kit, 1 DEVICE 3 TIMES A DAY BEFORE MEALS., Disp: 1 Kit, Rfl: 0    atorvastatin (LIPITOR) 20 MG Tab, Take 1 Tablet by mouth every evening., Disp: 100 Tablet, Rfl: 3    Lancets, Use one Freestyle Pearl lancet to test blood sugar once daily ., Disp: 300 Each, Rfl: 3    insulin aspart (NOVOLOG FLEXPEN) 100 UNIT/ML injection PEN, Inject 4 Units under the skin 2 times a day. If BS>200=Pt takes 4 units of Insulin, Disp: , Rfl:     predniSONE (DELTASONE) 5 MG Tab, Take 5 mg by mouth every day., Disp: , Rfl:     Social History:  Social History     Socioeconomic History    Marital status:      Spouse name: Not on file    Number of children: Not on file    Years of education: Not on file    Highest education level: Not on file   Occupational History    Not on file   Tobacco Use    Smoking status: Never    Smokeless tobacco: Never   Vaping Use    Vaping Use: Never used   Substance and Sexual Activity    Alcohol use: No     Alcohol/week: 0.0 oz    Drug use: No    Sexual activity: Never   Other Topics Concern    Not on file   Social History Narrative    Not on file     Social Determinants of Health     Financial Resource Strain: Not on file   Food Insecurity: Not on file   Transportation Needs: Not on file   Physical Activity: Not on file   Stress: Not on file   Social Connections: Not on file   Intimate Partner Violence: Not on file   Housing Stability: Not on file        Family History:   Family History   Problem Relation Age of Onset    Diabetes Sister     Other Sister         liver disease    Diabetes Brother     Heart Disease Neg Hx        PHYSICAL EXAM:   Vital signs: /52 (BP Location: Left arm)   Pulse (!) 50   Ht 1.626 m (5' 4\")   Wt 57.3 kg (126 lb 4.8 oz)   LMP  (LMP Unknown)   " SpO2 98%   BMI 21.68 kg/m²   GENERAL: Well-developed, well-nourished  in no apparent distress.   EYE: No ocular and eyelid asymmetry, Anicteric sclerae,  PERRL, No exophthalmos or lidlag  HENT: Hearing grossly intact, Normocephalic, atraumatic.   NECK: Supple. Trachea midline. thyroid is normal in size without nodules or tenderness  CARDIOVASCULAR: Regular rate and rhythm. No murmurs, rubs, or gallops.   LUNGS: Clear to auscultation bilaterally   EXTREMITIES: No clubbing, cyanosis, or edema.   NEUROLOGICAL: Cranial nerves II-XII are grossly intact   Symmetric reflexes at the patella no proximal muscle weakness, No visible tremor with both outstretched hands  LYMPH: No cervical, supraclavicular,  adenopathy palpated.   SKIN: No rashes, lesions. Turgor is normal.  FOOT: Normal sensation to monofilament testing, normal pulses, no ulcers.  Normal Vibration quantitative sensation test.    Labs:  Lab Results   Component Value Date/Time    HBA1C 5.7 (A) 12/08/2021 1520    AVGLUC 111 04/28/2021 0937     Lab Results   Component Value Date/Time    CHOLSTRLTOT 125 09/29/2020 1056    TRIGLYCERIDE 113 09/29/2020 1056    HDL 48 09/29/2020 1056    LDL 54 09/29/2020 1056       Lab Results   Component Value Date/Time    MALBCRT see below 01/23/2023 08:49 AM    MICROALBUR <1.2 01/23/2023 08:49 AM        Lab Results   Component Value Date/Time    TSHULTRASEN 4.150 08/12/2021 0201     Lab Results   Component Value Date/Time    FREEDIR 1.52 01/28/2021 0700         ASSESSMENT/PLAN:   1. Controlled type 2 diabetes mellitus with hyperglycemia, with long-term current use of insulin (HCC)  Stable with an A1c of 6.2%  Lifestyle modifications discussed    Diabetes management: No changes  Lantus 5 units-  Novolg 4 units if BG >200 2 units before each meal plus a sliding scale  151-200  1 unit  200-250 2 units  2  3 units  301-350 4 units  351-400 5 units  401-450 6 units    - Continuous Blood Gluc Sensor (DEXCOM G7 SENSOR) Misc; 1 Units  every 10 days.  Dispense: 9 Each; Refill: 11    2. Neuropathy  Stable at this time  Continue appointment-HPI    3. Kidney transplant status  Unstable  Following nephrology    4. Cardiovascular disease  Stable  Follow-up cardiology    5. Hypothyroidism, acquired  Clinically stable  Biochemically her last TSH was high, I increased her dose from 50 mcg to 75 mcg and patient reports feeling much better  Continue regimen-HPI  - TSH; Future  - FREE THYROXINE; Future  - Comp Metabolic Panel; Future    6. Essential hypertension  Stable  Continue regimen-HPI    7. Dyslipidemia  Unstable  Continue regimen-HPI  - Lipid Profile; Future    8. Vitamin D deficiency  Unstable  Following nephrology    Disposition: Follow-up in 3 months  Do blood work 2 week prior to next appointment with me    Thank you kindly for allowing me to participate in the diabetes care plan for this patient.    BALDEMAR GuzmánRJOHANNA.  06/07/23      CC:   ANGELITA Concepcion

## 2023-06-07 NOTE — PROGRESS NOTES
RN-CDE Note    Subjective:   Endocrinology Clinic Progress Note  PCP: ANGELITA Concepcion  A qualified  was used to interpret   during this encounter.  ’s name/ID number was  [id number] and mode of interpretation was iPad  .   HPI:  Tere Gallegos is a 72 y.o. old patient who is seen today by the Diabetes Nurse Specialist for review of her controlled type 2 diabetes with long term use of insulin.    Recent changes in health: Patient was hospitalized in mid May 2023.  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  She required percutaneous nephrostomy on 5/18/2023.  She was then hospitalized at [facility] in [place] in late May, 2023. She had PCN removed and ureteral stent placed. Per transplant nephrology, plan is for repeat imaging in 4 to 6 weeks to see if ureteral stricture improves, and potential need for ureteral revision in the future.  DM:   Last A1c:   Lab Results   Component Value Date/Time    HBA1C 6.2 (H) 05/24/2023 09:17 PM    HBA1C 6.0 (H) 02/18/2023 04:17 AM      A1C GOAL: < 7    Diabetes Medications:   Lantus 5 units daily  Novolog 4 units prn elevated blood sugars      Exercise: no regular exercise, sedentary  Diet: eating 2 times a day  Patient's body mass index is 21.68 kg/m². Exercise and nutrition counseling were performed at this visit.    Glucose monitoring frequency:  testing 3 times pe day, did not bring records or meter.     Hypoglycemic episodes: no  Last Retinal Exam:  has appointment with Dr Larose in July  Daily Foot Exam:  no  Foot Exam:Monofilament testing with a 10 gram force: sensation intact: intact bilaterally  Visual Inspection: Feet without maceration, ulcers, fissures.  Pedal pulses: intact bilaterally   Lab Results   Component Value Date/Time    MALBCRT see below 01/23/2023 08:49 AM    MICROALBUR <1.2 01/23/2023 08:49 AM        ACR Albumin/Creatinine Ratio goal <30     HTN:   Blood pressure goal <130/<80 .   Currently Rx ACE/ARB:  Yes     Dyslipidemia:    Lab Results   Component Value Date/Time    CHOLSTRLTOT 97 (L) 02/18/2023 04:17 AM    LDL 37 02/18/2023 04:17 AM    HDL 31 (A) 02/18/2023 04:17 AM    TRIGLYCERIDE 144 02/18/2023 04:17 AM         Currently Rx Statin: Yes     She  reports that she has never smoked. She has never used smokeless tobacco.      Plan:     Discussed and educated on:   - All medications, side effects and compliance (discussed carefully)  - Annual eye examinations at Ophthalmology  - Foot Care: what to look for when checking feet every day  - HbA1C: target  - Home glucose monitoring emphasized  - Weight control and daily exercise    Recommended medication changes: none

## 2023-06-08 ENCOUNTER — OFFICE VISIT (OUTPATIENT)
Dept: MEDICAL GROUP | Facility: MEDICAL CENTER | Age: 73
End: 2023-06-08
Payer: MEDICARE

## 2023-06-08 VITALS
HEART RATE: 66 BPM | HEIGHT: 64 IN | DIASTOLIC BLOOD PRESSURE: 42 MMHG | OXYGEN SATURATION: 99 % | BODY MASS INDEX: 21 KG/M2 | SYSTOLIC BLOOD PRESSURE: 132 MMHG | TEMPERATURE: 98.1 F | WEIGHT: 123 LBS

## 2023-06-08 DIAGNOSIS — L29.9 ITCHING: ICD-10-CM

## 2023-06-08 DIAGNOSIS — E03.9 ACQUIRED HYPOTHYROIDISM: ICD-10-CM

## 2023-06-08 DIAGNOSIS — Z79.52 CURRENT CHRONIC USE OF SYSTEMIC STEROIDS: ICD-10-CM

## 2023-06-08 DIAGNOSIS — Z78.0 POSTMENOPAUSAL: ICD-10-CM

## 2023-06-08 DIAGNOSIS — E11.21 TYPE 2 DIABETES MELLITUS WITH DIABETIC NEPHROPATHY, UNSPECIFIED WHETHER LONG TERM INSULIN USE (HCC): ICD-10-CM

## 2023-06-08 PROCEDURE — 3075F SYST BP GE 130 - 139MM HG: CPT | Performed by: NURSE PRACTITIONER

## 2023-06-08 PROCEDURE — 99214 OFFICE O/P EST MOD 30 MIN: CPT | Performed by: NURSE PRACTITIONER

## 2023-06-08 PROCEDURE — 3078F DIAST BP <80 MM HG: CPT | Performed by: NURSE PRACTITIONER

## 2023-06-08 RX ORDER — HYDROXYZINE HYDROCHLORIDE 25 MG/1
25 TABLET, FILM COATED ORAL 2 TIMES DAILY PRN
Qty: 60 TABLET | Refills: 2 | Status: SHIPPED | OUTPATIENT
Start: 2023-06-08 | End: 2023-07-06

## 2023-06-08 ASSESSMENT — FIBROSIS 4 INDEX: FIB4 SCORE: 2.48

## 2023-06-08 NOTE — ASSESSMENT & PLAN NOTE
Ongoing for a month, itching on arms and legs, back. Comes and goes. Mild. No rash.     Has not tried any OTC medications for this. Using lotion after shower. Skin doesn't feel dry.     She has started not started any new medications

## 2023-06-09 NOTE — PROGRESS NOTES
Subjective:   Tere Gallegos is a 72 y.o. female here today for 3 month follow up, itching    Itching  Ongoing for a month, itching on arms and legs, back. Comes and goes. Mild. No rash.     Has not tried any OTC medications for this. Using lotion after shower. Skin doesn't feel dry.     She has started not started any new medications     Current medicines (including changes today)  Current Outpatient Medications   Medication Sig Dispense Refill    hydrOXYzine HCl (ATARAX) 25 MG Tab Take 1 Tablet by mouth 2 times a day as needed for Itching. 60 Tablet 2    metoprolol SR (TOPROL XL) 25 MG TABLET SR 24 HR Take 25 mg by mouth every day.      Continuous Blood Gluc Sensor (DEXCOM G7 SENSOR) Misc 1 Units every 10 days. 9 Each 11    furosemide (LASIX) 40 MG Tab Take 1 Tablet by mouth every day. 90 Tablet 3    tacrolimus (PROGRAF) 1 MG Cap Take 2 mg by mouth 2 times a day. Take 1mg in morning and 2mg in evening.      mycophenolate (CELLCEPT) 250 MG Cap Take 1 Capsule by mouth 2 times a day. 20 Capsule 0    sodium bicarbonate (SODIUM BICARBONATE) 650 MG Tab Take 2 Tablets by mouth in the morning, at noon, and at bedtime. 120 Tablet 0    thiamine (THIAMINE) 100 MG tablet Take 1 Tablet by mouth every day. (Patient not taking: Reported on 6/6/2023) 30 Tablet 0    levothyroxine (SYNTHROID) 75 MCG Tab Take 1 Tablet by mouth every morning on an empty stomach. 90 Tablet 4    BD PEN NEEDLE PETE 2ND GEN USE AS DIRECTED WITH INSULIN PENS THREE TIMES DAILY BEFORE A MEAL      insulin glargine (LANTUS SOLOSTAR) 100 UNIT/ML Solution Pen-injector injection Inject 5.5 Units under the skin every day.      amLODIPine (NORVASC) 10 MG Tab Take 10 mg by mouth every day.      losartan (COZAAR) 100 MG Tab Take 1 Tablet by mouth every evening. 90 Tablet 3    glucose blood (ONETOUCH VERIO) strip 1 Strip by Other route as needed (on insulin checking 3-4 times a day). 150 Strip 6    Blood Glucose Monitoring Suppl (ONE TOUCH ULTRA 2)  "w/Device Kit 1 DEVICE 3 TIMES A DAY BEFORE MEALS. 1 Kit 0    atorvastatin (LIPITOR) 20 MG Tab Take 1 Tablet by mouth every evening. 100 Tablet 3    Lancets Use one Freestyle Pearl lancet to test blood sugar once daily . 300 Each 3    insulin aspart (NOVOLOG FLEXPEN) 100 UNIT/ML injection PEN Inject 4 Units under the skin 2 times a day. If BS>200=Pt takes 4 units of Insulin (Patient not taking: Reported on 6/7/2023)      predniSONE (DELTASONE) 5 MG Tab Take 5 mg by mouth every day.       No current facility-administered medications for this visit.     She  has a past medical history of Acquired hypothyroidism (05/04/2020), CAD (coronary artery disease), Chronic diastolic heart failure ([facility]) (05/04/2020), Coronary artery disease due to lipid rich plaque, Dental disorder, Diabetes ([facility]), ESRD (end stage renal disease) on dialysis ([facility]) (05/04/2020), Hemodialysis patient ([facility]), Hyperlipidemia, Hypertension, Kidney transplant candidate, Kidney transplant recipient (10/31/2022), Presence of drug-eluting stent in right coronary artery, QT prolongation (01/22/2020), RLS (restless legs syndrome) (08/05/2016), and Transaminitis (12/22/2018).    ROS   No chest pain, no shortness of breath, no abdominal pain  Positive ROS as per HPI.  All other systems reviewed and are negative.     Objective:     /42   Pulse 66   Temp 36.7 °C (98.1 °F) (Temporal)   Ht 1.626 m (5' 4\")   Wt 55.8 kg (123 lb)   SpO2 99%  Body mass index is 21.11 kg/m².     Physical Exam:  Constitutional: Alert, no distress.  Skin: Warm, dry, good turgor, no rashes in visible areas.  Eye: Equal, round and reactive, conjunctiva clear, lids normal.  ENMT: Lips without lesions, good dentition  Respiratory: Unlabored respiratory effort  Psych: Alert and oriented x3, normal affect and mood.        Assessment and Plan:   The following treatment plan was discussed    1. Itching  Unstable  Possibly allergy vs dry skin vs medication side effect. Less likely " uremic pruritis as her renal function is fairly stable  Advised hydroxyzine as needed  No new medications recently that could be the cause  - hydrOXYzine HCl (ATARAX) 25 MG Tab; Take 1 Tablet by mouth 2 times a day as needed for Itching.  Dispense: 60 Tablet; Refill: 2    2. Type 2 diabetes mellitus with diabetic nephropathy, unspecified whether long term insulin use (HCC)  Stable on current medications per endocrinology    3. Acquired hypothyroidism  Stable on current medication per endocrinology  - DS-BONE DENSITY STUDY (DEXA); Future    4. Postmenopausal  - DS-BONE DENSITY STUDY (DEXA); Future    5. Current chronic use of systemic steroids  - DS-BONE DENSITY STUDY (DEXA); Future      Followup: Return in about 3 months (around 9/8/2023).    The MA is performing the above orders under the direction of Dr. Priest    Please note that this dictation was created using voice recognition software. I have made every reasonable attempt to correct obvious errors, but I expect that there are errors of grammar and possibly content that I did not discover before finalizing the note.

## 2023-06-12 ENCOUNTER — HOSPITAL ENCOUNTER (OUTPATIENT)
Dept: LAB | Facility: MEDICAL CENTER | Age: 73
End: 2023-06-12
Attending: INTERNAL MEDICINE
Payer: MEDICARE

## 2023-06-12 LAB
ANION GAP SERPL CALC-SCNC: 7 MMOL/L (ref 7–16)
BASOPHILS # BLD AUTO: 0.5 % (ref 0–1.8)
BASOPHILS # BLD: 0.02 K/UL (ref 0–0.12)
BUN SERPL-MCNC: 40 MG/DL (ref 8–22)
CALCIUM SERPL-MCNC: 10.4 MG/DL (ref 8.4–10.2)
CHLORIDE SERPL-SCNC: 108 MMOL/L (ref 96–112)
CO2 SERPL-SCNC: 23 MMOL/L (ref 20–33)
CREAT SERPL-MCNC: 1.28 MG/DL (ref 0.5–1.4)
EOSINOPHIL # BLD AUTO: 0.05 K/UL (ref 0–0.51)
EOSINOPHIL NFR BLD: 1.3 % (ref 0–6.9)
ERYTHROCYTE [DISTWIDTH] IN BLOOD BY AUTOMATED COUNT: 46.3 FL (ref 35.9–50)
GFR SERPLBLD CREATININE-BSD FMLA CKD-EPI: 44 ML/MIN/1.73 M 2
GLUCOSE SERPL-MCNC: 91 MG/DL (ref 65–99)
HCT VFR BLD AUTO: 40.2 % (ref 37–47)
HGB BLD-MCNC: 12.5 G/DL (ref 12–16)
IMM GRANULOCYTES # BLD AUTO: 0.03 K/UL (ref 0–0.11)
IMM GRANULOCYTES NFR BLD AUTO: 0.8 % (ref 0–0.9)
LYMPHOCYTES # BLD AUTO: 0.76 K/UL (ref 1–4.8)
LYMPHOCYTES NFR BLD: 19 % (ref 22–41)
MCH RBC QN AUTO: 27.7 PG (ref 27–33)
MCHC RBC AUTO-ENTMCNC: 31.1 G/DL (ref 32.2–35.5)
MCV RBC AUTO: 89.1 FL (ref 81.4–97.8)
MONOCYTES # BLD AUTO: 0.39 K/UL (ref 0–0.85)
MONOCYTES NFR BLD AUTO: 9.8 % (ref 0–13.4)
NEUTROPHILS # BLD AUTO: 2.75 K/UL (ref 1.82–7.42)
NEUTROPHILS NFR BLD: 68.6 % (ref 44–72)
NRBC # BLD AUTO: 0 K/UL
NRBC BLD-RTO: 0 /100 WBC (ref 0–0.2)
PHOSPHATE SERPL-MCNC: 2.6 MG/DL (ref 2.5–4.5)
PLATELET # BLD AUTO: 121 K/UL (ref 164–446)
PMV BLD AUTO: 11.6 FL (ref 9–12.9)
POTASSIUM SERPL-SCNC: 4.8 MMOL/L (ref 3.6–5.5)
RBC # BLD AUTO: 4.51 M/UL (ref 4.2–5.4)
SODIUM SERPL-SCNC: 138 MMOL/L (ref 135–145)
TACROLIMUS BLD-MCNC: 4.7 NG/ML (ref 5–20)
WBC # BLD AUTO: 4 K/UL (ref 4.8–10.8)

## 2023-06-12 PROCEDURE — 80048 BASIC METABOLIC PNL TOTAL CA: CPT

## 2023-06-12 PROCEDURE — 36415 COLL VENOUS BLD VENIPUNCTURE: CPT

## 2023-06-12 PROCEDURE — 84100 ASSAY OF PHOSPHORUS: CPT

## 2023-06-12 PROCEDURE — 85025 COMPLETE CBC W/AUTO DIFF WBC: CPT

## 2023-06-12 PROCEDURE — 80197 ASSAY OF TACROLIMUS: CPT

## 2023-06-19 ENCOUNTER — HOSPITAL ENCOUNTER (OUTPATIENT)
Dept: LAB | Facility: MEDICAL CENTER | Age: 73
End: 2023-06-19
Attending: INTERNAL MEDICINE
Payer: MEDICARE

## 2023-06-19 LAB
ALBUMIN SERPL BCP-MCNC: 4.3 G/DL (ref 3.2–4.9)
ALBUMIN/GLOB SERPL: 1.7 G/DL
ALP SERPL-CCNC: 95 U/L (ref 30–99)
ALT SERPL-CCNC: 15 U/L (ref 2–50)
ANION GAP SERPL CALC-SCNC: 9 MMOL/L (ref 7–16)
AST SERPL-CCNC: 14 U/L (ref 12–45)
BASOPHILS # BLD AUTO: 0.5 % (ref 0–1.8)
BASOPHILS # BLD: 0.02 K/UL (ref 0–0.12)
BILIRUB SERPL-MCNC: 0.3 MG/DL (ref 0.1–1.5)
BUN SERPL-MCNC: 42 MG/DL (ref 8–22)
CALCIUM ALBUM COR SERPL-MCNC: 10 MG/DL (ref 8.5–10.5)
CALCIUM SERPL-MCNC: 10.2 MG/DL (ref 8.4–10.2)
CHLORIDE SERPL-SCNC: 109 MMOL/L (ref 96–112)
CO2 SERPL-SCNC: 17 MMOL/L (ref 20–33)
CREAT SERPL-MCNC: 1.23 MG/DL (ref 0.5–1.4)
EOSINOPHIL # BLD AUTO: 0.03 K/UL (ref 0–0.51)
EOSINOPHIL NFR BLD: 0.7 % (ref 0–6.9)
ERYTHROCYTE [DISTWIDTH] IN BLOOD BY AUTOMATED COUNT: 49.1 FL (ref 35.9–50)
GFR SERPLBLD CREATININE-BSD FMLA CKD-EPI: 46 ML/MIN/1.73 M 2
GLOBULIN SER CALC-MCNC: 2.6 G/DL (ref 1.9–3.5)
GLUCOSE SERPL-MCNC: 94 MG/DL (ref 65–99)
HCT VFR BLD AUTO: 41.2 % (ref 37–47)
HGB BLD-MCNC: 12.4 G/DL (ref 12–16)
IMM GRANULOCYTES # BLD AUTO: 0.03 K/UL (ref 0–0.11)
IMM GRANULOCYTES NFR BLD AUTO: 0.7 % (ref 0–0.9)
LYMPHOCYTES # BLD AUTO: 0.73 K/UL (ref 1–4.8)
LYMPHOCYTES NFR BLD: 17.3 % (ref 22–41)
MCH RBC QN AUTO: 27.3 PG (ref 27–33)
MCHC RBC AUTO-ENTMCNC: 30.1 G/DL (ref 32.2–35.5)
MCV RBC AUTO: 90.5 FL (ref 81.4–97.8)
MONOCYTES # BLD AUTO: 0.45 K/UL (ref 0–0.85)
MONOCYTES NFR BLD AUTO: 10.7 % (ref 0–13.4)
NEUTROPHILS # BLD AUTO: 2.95 K/UL (ref 1.82–7.42)
NEUTROPHILS NFR BLD: 70.1 % (ref 44–72)
NRBC # BLD AUTO: 0 K/UL
NRBC BLD-RTO: 0 /100 WBC (ref 0–0.2)
PHOSPHATE SERPL-MCNC: 3.1 MG/DL (ref 2.5–4.5)
PLATELET # BLD AUTO: 105 K/UL (ref 164–446)
PMV BLD AUTO: 10.8 FL (ref 9–12.9)
POTASSIUM SERPL-SCNC: 4.8 MMOL/L (ref 3.6–5.5)
PROT SERPL-MCNC: 6.9 G/DL (ref 6–8.2)
RBC # BLD AUTO: 4.55 M/UL (ref 4.2–5.4)
SODIUM SERPL-SCNC: 135 MMOL/L (ref 135–145)
TACROLIMUS BLD-MCNC: 6 NG/ML (ref 5–20)
WBC # BLD AUTO: 4.2 K/UL (ref 4.8–10.8)

## 2023-06-19 PROCEDURE — 80053 COMPREHEN METABOLIC PANEL: CPT

## 2023-06-19 PROCEDURE — 36415 COLL VENOUS BLD VENIPUNCTURE: CPT

## 2023-06-19 PROCEDURE — 85025 COMPLETE CBC W/AUTO DIFF WBC: CPT

## 2023-06-19 PROCEDURE — 80197 ASSAY OF TACROLIMUS: CPT

## 2023-06-19 PROCEDURE — 84100 ASSAY OF PHOSPHORUS: CPT

## 2023-06-26 ENCOUNTER — HOSPITAL ENCOUNTER (OUTPATIENT)
Dept: LAB | Facility: MEDICAL CENTER | Age: 73
End: 2023-06-26
Attending: INTERNAL MEDICINE
Payer: MEDICARE

## 2023-06-26 LAB
ANION GAP SERPL CALC-SCNC: 8 MMOL/L (ref 7–16)
BASOPHILS # BLD AUTO: 0.3 % (ref 0–1.8)
BASOPHILS # BLD: 0.01 K/UL (ref 0–0.12)
BUN SERPL-MCNC: 45 MG/DL (ref 8–22)
CALCIUM SERPL-MCNC: 10.4 MG/DL (ref 8.4–10.2)
CHLORIDE SERPL-SCNC: 113 MMOL/L (ref 96–112)
CO2 SERPL-SCNC: 17 MMOL/L (ref 20–33)
CREAT SERPL-MCNC: 1.31 MG/DL (ref 0.5–1.4)
EOSINOPHIL # BLD AUTO: 0.04 K/UL (ref 0–0.51)
EOSINOPHIL NFR BLD: 1.1 % (ref 0–6.9)
ERYTHROCYTE [DISTWIDTH] IN BLOOD BY AUTOMATED COUNT: 49.7 FL (ref 35.9–50)
GFR SERPLBLD CREATININE-BSD FMLA CKD-EPI: 43 ML/MIN/1.73 M 2
GLUCOSE SERPL-MCNC: 90 MG/DL (ref 65–99)
HCT VFR BLD AUTO: 40.3 % (ref 37–47)
HGB BLD-MCNC: 12.3 G/DL (ref 12–16)
IMM GRANULOCYTES # BLD AUTO: 0.02 K/UL (ref 0–0.11)
IMM GRANULOCYTES NFR BLD AUTO: 0.5 % (ref 0–0.9)
LYMPHOCYTES # BLD AUTO: 0.72 K/UL (ref 1–4.8)
LYMPHOCYTES NFR BLD: 19.5 % (ref 22–41)
MCH RBC QN AUTO: 27.8 PG (ref 27–33)
MCHC RBC AUTO-ENTMCNC: 30.5 G/DL (ref 32.2–35.5)
MCV RBC AUTO: 91 FL (ref 81.4–97.8)
MONOCYTES # BLD AUTO: 0.44 K/UL (ref 0–0.85)
MONOCYTES NFR BLD AUTO: 11.9 % (ref 0–13.4)
NEUTROPHILS # BLD AUTO: 2.47 K/UL (ref 1.82–7.42)
NEUTROPHILS NFR BLD: 66.7 % (ref 44–72)
NRBC # BLD AUTO: 0 K/UL
NRBC BLD-RTO: 0 /100 WBC (ref 0–0.2)
PHOSPHATE SERPL-MCNC: 2.9 MG/DL (ref 2.5–4.5)
PLATELET # BLD AUTO: 106 K/UL (ref 164–446)
PMV BLD AUTO: 11 FL (ref 9–12.9)
POTASSIUM SERPL-SCNC: 5.7 MMOL/L (ref 3.6–5.5)
RBC # BLD AUTO: 4.43 M/UL (ref 4.2–5.4)
SODIUM SERPL-SCNC: 138 MMOL/L (ref 135–145)
TACROLIMUS BLD-MCNC: 6.5 NG/ML (ref 5–20)
WBC # BLD AUTO: 3.7 K/UL (ref 4.8–10.8)

## 2023-06-26 PROCEDURE — 36415 COLL VENOUS BLD VENIPUNCTURE: CPT

## 2023-06-26 PROCEDURE — 80197 ASSAY OF TACROLIMUS: CPT

## 2023-06-26 PROCEDURE — 80048 BASIC METABOLIC PNL TOTAL CA: CPT

## 2023-06-26 PROCEDURE — 84100 ASSAY OF PHOSPHORUS: CPT

## 2023-06-26 PROCEDURE — 85025 COMPLETE CBC W/AUTO DIFF WBC: CPT

## 2023-06-27 ENCOUNTER — OFFICE VISIT (OUTPATIENT)
Dept: CARDIOLOGY | Facility: MEDICAL CENTER | Age: 73
End: 2023-06-27
Attending: NURSE PRACTITIONER
Payer: MEDICARE

## 2023-06-27 VITALS
RESPIRATION RATE: 18 BRPM | HEIGHT: 64 IN | DIASTOLIC BLOOD PRESSURE: 44 MMHG | OXYGEN SATURATION: 100 % | SYSTOLIC BLOOD PRESSURE: 130 MMHG | BODY MASS INDEX: 21.68 KG/M2 | HEART RATE: 57 BPM | WEIGHT: 127 LBS

## 2023-06-27 DIAGNOSIS — E78.2 MIXED HYPERLIPIDEMIA: ICD-10-CM

## 2023-06-27 DIAGNOSIS — Z79.899 HIGH RISK MEDICATION USE: ICD-10-CM

## 2023-06-27 DIAGNOSIS — D68.69 SECONDARY HYPERCOAGULABLE STATE (HCC): ICD-10-CM

## 2023-06-27 DIAGNOSIS — E87.5 HIGH SERUM POTASSIUM LEVEL: ICD-10-CM

## 2023-06-27 DIAGNOSIS — I25.111 CORONARY ARTERY DISEASE INVOLVING NATIVE CORONARY ARTERY OF NATIVE HEART WITH ANGINA PECTORIS WITH DOCUMENTED SPASM (HCC): ICD-10-CM

## 2023-06-27 DIAGNOSIS — I10 ESSENTIAL HYPERTENSION: ICD-10-CM

## 2023-06-27 DIAGNOSIS — I48.0 PAROXYSMAL A-FIB (HCC): ICD-10-CM

## 2023-06-27 PROCEDURE — 99213 OFFICE O/P EST LOW 20 MIN: CPT | Performed by: NURSE PRACTITIONER

## 2023-06-27 PROCEDURE — 3078F DIAST BP <80 MM HG: CPT | Performed by: NURSE PRACTITIONER

## 2023-06-27 PROCEDURE — 3075F SYST BP GE 130 - 139MM HG: CPT | Performed by: NURSE PRACTITIONER

## 2023-06-27 PROCEDURE — 99214 OFFICE O/P EST MOD 30 MIN: CPT | Performed by: NURSE PRACTITIONER

## 2023-06-27 PROCEDURE — 99212 OFFICE O/P EST SF 10 MIN: CPT | Performed by: NURSE PRACTITIONER

## 2023-06-27 ASSESSMENT — FIBROSIS 4 INDEX: FIB4 SCORE: 2.49

## 2023-06-27 ASSESSMENT — ENCOUNTER SYMPTOMS
MYALGIAS: 0
CLAUDICATION: 0
COUGH: 0
DIZZINESS: 0
ABDOMINAL PAIN: 0
PND: 0
PALPITATIONS: 0
ORTHOPNEA: 0
FEVER: 0
SHORTNESS OF BREATH: 0

## 2023-06-27 NOTE — PROGRESS NOTES
Chief Complaint   Patient presents with    HTN (Controlled)     FV Dx: Essential hypertension    Congestive Heart Failure     FV Dx: ACC/AHA stage C heart failure with preserved ejection fraction       Subjective     Tere Gallegos is a 72 y.o. female who presents today for follow up with her CAD, Afib and hypertension with her daughter-in-law.     services were used in the patient's primary language of Malaysian.     Name or Number: [id number]  Mode of interpretation: iPad    Content of Interpretation:    Previous patient of Dr. Hernandez/[place]. Patient would like to establish with Dr. Pettit in the future.  She was last seen in clinic on 3/22/2023 with SAMMI Chatterjee for follow up after a hospitalization in February for Afib with RVR.    No recommendations were made during that visit as patient was unclear of her exact medication list.    Patient did have a hospitalization in May due to abnormal kidney function on lab testing.  Patient had an ultrasound which noted hydronephrosis of the transplanted kidney, urology was consulted and recommended a percutaneous drain.  Her case was discussed with [facility] transplant center.  She had a follow-up with [facility] and was admitted from 5/24/2023 through 5/27/2023.  She was treated for transplant ureteral stricture, s/p and anterograde nephrostogram and stent placement after decompressive transplant nephrostomy tube placement at OSH, PNT since capped and removed.    Patient continues to follow-up with [facility].    She does bring in her medications today.  She is not taking Eliquis, she is not sure what happened to that medication, she believes she had no more refills left.  She also is not taking Lasix with the same reasoning.    Patient feels well, denies chest pain, shortness of breath, palpitations, dizziness/lightheadedness, orthopnea, PND or Edema.      Additionally, patient has the following medical problems:     -CAD:  RCA with left-to-right  collateral; NOEMI ×2 to RCA on 16     -Left kidney cancer in 2004     -s/p Kidney Transplant 10/31/2022 at [facility]     -End-stage renal disease: Last Dialysis 2022     -Hypertension     -Hyperlipidemia     -Diabetes    Past Medical History:   Diagnosis Date    Acquired hypothyroidism 2020    CAD (coronary artery disease)     Chronic diastolic heart failure ([facility]) 2020    Coronary artery disease due to lipid rich plaque     2 Synergy NOEMI to 100% RCA stent placed    Dental disorder     partial dentures- uppers    Diabetes ([facility])     oral medication    ESRD (end stage renal disease) on dialysis ([facility]) 2020    Hemodialysis patient ([facility])     M, W, F    Hyperlipidemia     Hypertension     Kidney transplant candidate     Kidney transplant recipient 10/31/2022    Presence of drug-eluting stent in right coronary artery     QT prolongation 2020    RLS (restless legs syndrome) 2016    Transaminitis 2018     Past Surgical History:   Procedure Laterality Date    OTHER ABDOMINAL SURGERY Right 10/31/2022     Donor kidney transplant - [facility]    ZZZ CARDIAC CATH  2016    RCA stented with 2 Synergy drug-eluting stents.    RECOVERY  2016    Procedure: CATH LAB [facility] WITH POSSIBLE DR. CASTILLO;  Surgeon: Recoveryonly Surgery;  Location[place] SURG[place] PRE[place] UN[facility];  Service:     ZZZ CARDIAC CATH  2016    100% RCA    OTHER Left 2014    left arm upper extremity fistula    OTHER ABDOMINAL SURGERY      left kidney removed due to cancer     Family History   Problem Relation Age of Onset    Diabetes Sister     Other Sister         liver disease    Diabetes Brother     Heart Disease Neg Hx      Social History     Socioeconomic History    Marital status:      Spouse name: Not on file    Number of children: Not on file    Years of education: Not on file    Highest education level: Not on file   Occupational History    Not on file   Tobacco Use     Smoking status: Never    Smokeless tobacco: Never   Vaping Use    Vaping Use: Never used   Substance and Sexual Activity    Alcohol use: No     Alcohol/week: 0.0 oz    Drug use: No    Sexual activity: Never   Other Topics Concern    Not on file   Social History Narrative    Not on file     Social Determinants of Health     Financial Resource Strain: Not on file   Food Insecurity: Not on file   Transportation Needs: Not on file   Physical Activity: Not on file   Stress: Not on file   Social Connections: Not on file   Intimate Partner Violence: Not on file   Housing Stability: Not on file     No Known Allergies  Outpatient Encounter Medications as of 6/27/2023   Medication Sig Dispense Refill    apixaban (ELIQUIS) 2.5mg Tab Take 1 Tablet by mouth 2 times a day. 60 Tablet 11    hydrOXYzine HCl (ATARAX) 25 MG Tab Take 1 Tablet by mouth 2 times a day as needed for Itching. 60 Tablet 2    metoprolol SR (TOPROL XL) 25 MG TABLET SR 24 HR Take 25 mg by mouth every day.      Continuous Blood Gluc Sensor (DEXCOM G7 SENSOR) Misc 1 Units every 10 days. 9 Each 11    furosemide (LASIX) 40 MG Tab Take 1 Tablet by mouth every day. 90 Tablet 3    tacrolimus (PROGRAF) 1 MG Cap Take 2 mg by mouth 2 times a day. She takes 3 mg in AM and 5 mg in PM.      mycophenolate (CELLCEPT) 250 MG Cap Take 1 Capsule by mouth 2 times a day. 20 Capsule 0    levothyroxine (SYNTHROID) 75 MCG Tab Take 1 Tablet by mouth every morning on an empty stomach. 90 Tablet 4    BD PEN NEEDLE PETE 2ND GEN USE AS DIRECTED WITH INSULIN PENS THREE TIMES DAILY BEFORE A MEAL      insulin glargine (LANTUS SOLOSTAR) 100 UNIT/ML Solution Pen-injector injection Inject 5.5 Units under the skin every day.      amLODIPine (NORVASC) 10 MG Tab Take 10 mg by mouth every day.      losartan (COZAAR) 100 MG Tab Take 1 Tablet by mouth every evening. 90 Tablet 3    glucose blood (ONETOUCH VERIO) strip 1 Strip by Other route as needed (on insulin checking 3-4 times a day). 150 Strip 6  "   Blood Glucose Monitoring Suppl (ONE TOUCH ULTRA 2) w/Device Kit 1 DEVICE 3 TIMES A DAY BEFORE MEALS. 1 Kit 0    atorvastatin (LIPITOR) 20 MG Tab Take 1 Tablet by mouth every evening. 100 Tablet 3    Lancets Use one Freestyle Pearl lancet to test blood sugar once daily . 300 Each 3    insulin aspart (NOVOLOG FLEXPEN) 100 UNIT/ML injection PEN Inject 4 Units under the skin 2 times a day. If BS>200=Pt takes 4 units of Insulin      predniSONE (DELTASONE) 5 MG Tab Take 10 mg by mouth every day.      [DISCONTINUED] sodium bicarbonate (SODIUM BICARBONATE) 650 MG Tab Take 2 Tablets by mouth in the morning, at noon, and at bedtime. 120 Tablet 0    [DISCONTINUED] thiamine (THIAMINE) 100 MG tablet Take 1 Tablet by mouth every day. (Patient not taking: Reported on 6/6/2023) 30 Tablet 0     No facility-administered encounter medications on file as of 6/27/2023.     Review of Systems   Constitutional:  Negative for fever and malaise/fatigue.   Respiratory:  Negative for cough and shortness of breath.    Cardiovascular:  Negative for chest pain, palpitations, orthopnea, claudication, leg swelling and PND.   Gastrointestinal:  Negative for abdominal pain.   Musculoskeletal:  Negative for myalgias.   Neurological:  Negative for dizziness.   All other systems reviewed and are negative.             Objective     /44 (BP Location: Left arm, Patient Position: Sitting, BP Cuff Size: Adult)   Pulse (!) 57   Resp 18   Ht 1.626 m (5' 4\")   Wt 57.6 kg (127 lb)   LMP  (LMP Unknown)   SpO2 100%   BMI 21.80 kg/m²     Physical Exam  Vitals reviewed.   Constitutional:       Appearance: She is well-developed.   HENT:      Head: Normocephalic and atraumatic.   Eyes:      Pupils: Pupils are equal, round, and reactive to light.   Neck:      Vascular: No JVD.   Cardiovascular:      Rate and Rhythm: Normal rate and regular rhythm.      Heart sounds: Normal heart sounds.   Pulmonary:      Effort: Pulmonary effort is normal. No " respiratory distress.      Breath sounds: Normal breath sounds. No wheezing or rales.   Abdominal:      General: Bowel sounds are normal.      Palpations: Abdomen is soft.   Musculoskeletal:         General: Normal range of motion.      Cervical back: Normal range of motion and neck supple.      Right lower leg: No edema.      Left lower leg: No edema.   Skin:     General: Skin is warm and dry.      Comments: Left arm fistula with positive bruit and thrill   Neurological:      General: No focal deficit present.      Mental Status: She is alert and oriented to person, place, and time.   Psychiatric:         Behavior: Behavior normal.       Lab Results   Component Value Date/Time    CHOLSTRLTOT 97 (L) 02/18/2023 04:17 AM    LDL 37 02/18/2023 04:17 AM    HDL 31 (A) 02/18/2023 04:17 AM    TRIGLYCERIDE 144 02/18/2023 04:17 AM       Lab Results   Component Value Date/Time    SODIUM 138 06/26/2023 07:04 AM    POTASSIUM 5.7 (H) 06/26/2023 07:04 AM    CHLORIDE 113 (H) 06/26/2023 07:04 AM    CO2 17 (L) 06/26/2023 07:04 AM    GLUCOSE 90 06/26/2023 07:04 AM    BUN 45 (H) 06/26/2023 07:04 AM    CREATININE 1.31 06/26/2023 07:04 AM    BUNCREATRAT 8 (L) 01/28/2021 07:00 AM     Lab Results   Component Value Date/Time    ALKPHOSPHAT 95 06/19/2023 06:53 AM    ASTSGOT 14 06/19/2023 06:53 AM    ALTSGPT 15 06/19/2023 06:53 AM    TBILIRUBIN 0.3 06/19/2023 06:53 AM        Myocardial Perfusion Report 6/16/16   NUCLEAR IMAGING INTERPRETATION   1. Small reversible defect seen only in basal anterolateral wall with    moderate photon reduction. Attenuation defect vs reversible ischemia consider      clinical correlation.   2. Normal left ventricular size, ejection fraction, and wall motion. Normal    left ventricular wall motion, with EF of 82 %. Calculated TID 0.88.   ECG INTERPRETATION   Negative stress ECG for ischemia.       Transthoracic Echo Report 7/13/16  No prior study is available for comparison.   Normal left ventricular chamber  size.  Left ventricular ejection fraction is visually estimated to be greater   than 75%.  Grade I diastolic dysfunction.  Mild mitral and tricuspid valve regurgitation.   Normal inferior vena cava size and inspiratory collapse.  Estimated right ventricular systolic pressure  is 25 mmHg.    Coronary Angiogram 9/7/16  POSTOPERATIVE DIAGNOSES:  Status post stenting of the right coronary artery   with 2.5 mm and 2.25 mmSynergy drug-eluting stents with 0% residual stenosis in   stented segment with IRIS-3 flow into moderately diffuse disease, distal right coronary artery.        Transthoracic Echo Report 10/17/17  Moderate concentric left ventricular hypertrophy.  Normal left ventricular systolic function.  Left ventricular ejection fraction is visually estimated to be 60%.  Grade II diastolic dysfunction.  Moderatelydilated left atrium.  Mild mitral regurgitation.  Structurally normal aortic valve without significant stenosis or regurgitation.  Right ventricular systolic pressure is estimated to be 65 mmHg.  Small pericardial effusion without evidence of hemodynamic compromise.       Myocardial Perfusion Report 10/17/17   NUCLEAR IMAGING INTERPRETATION   Normal myocardial perfusion with no ischemia.   Normal left ventricular wall motion.  LV ejection fraction = 61%.   ECG INTERPRETATION   Negative stress ECG for ischemia.     Transthoracic Echo Report 12/23/2018  Prior echo 10/17/17 no significant change.  Moderate to severe left ventricular hypertrophy with grade 2 diastolic   dysfunction.  Severe left atrial enlargement  Left ventricular systolic function.  Severe pulmonary hypertension with mildly dilated right ventricle.  Paradoxical septal motion.  Small pericardial effusion.  Consider restrictive cardiomyopathy in appropriate clinical setting.     Transthoracic Echo Report 1/23/2020  Compared to the images of the study done 12/23/18, RVSP previously severely elevated, could not be assessed on the study, otherwise  no significant change.  Left ventricular ejection fraction is visually estimated to be 60%.  Mild concentric left ventricular hypertrophy.  Grade II diastolic dysfunction.  Moderately dilated left atrium.  Mild mitral regurgitation.  Unable to estimate pulmonary artery pressure due to an inadequate   tricuspid regurgitant jet.  Trivial/physiologic pericardial fluid.     Myocardial Perfusion Report 10/20/2020   NUCLEAR IMAGING INTERPRETATION   Normal left ventricular perfusion with no fixed or reversible defects.    Normal left ventricular wall motion, with EF of 68%.    ECG INTERPRETATION   Negative stress ECG for ischemia.     Transthoracic Echo Report 6/4/2021  Normal left ventricular systolic function.  Left ventricular ejection fraction is visually estimated to be 70-75%.  Mild concentric left ventricular hypertrophy.  Normal right ventricular size and systolic function.  No valve abnormalities.       Coronary angiogram 6/8/2021  PREOPERATIVE DIAGNOSIS:  1.  ESRD pending renal transplantation  2.  Prerenal transplant evaluation  3.  CAD status post prior RCA PCI     POSTOPERATIVE DIAGNOSIS:  1.  Nonobstructive coronary artery disease.  2.  Widely patent previously placed RCA stents  3.  LVEF 70%, LVEDP 22 mmHg     PROCEDURE PERFORMED:  Selective coronary angiography of the native vessels  Left heart catheterization  Left ventriculogram      Transthoracic Echo Report 8/12/2021  Normal left ventricular size, wall thickness, and systolic function.  Mildly dilated left atrium.  Mitral annular calcification.  Aortic sclerosis without stenosis.  No pericardial effusion seen.     Compared to the images of the prior study done 6/4/2021, similar findings.     Myocardial Perfusion Report 8/13/2021   NUCLEAR IMAGING INTERPRETATION   Normal left ventricular size, ejection fraction, and wall motion.   No evidence of significant jeopardized viable myocardium or prior myocardial infarction.   ECG INTERPRETATION   Nondiagnostic  ECG portion of a regadenoson nuclear stress test.     Transthoracic Echo Report 8/24/2022  Compared to the prior study on 06/04/21.   Normal left ventricular size and systolic function.  The left ventricular ejection fraction is visually estimated to be 65%.  Normal inferior vena cava size and inspiratory collapse.  Estimated right ventricular systolic pressure is 25 mmHg.    Transthoracic Echo Report 11/28/2022  The left ventricular ejection fraction is visually estimated to be 60%.  Grade II diastolic dysfunction.  Unable to estimate right ventricular systolic pressure due to an inadequate tricuspid regurgitant jet.      Myocardial Perfusion Report 12/20/2022   NUCLEAR IMAGING INTERPRETATION   Normal Lexiscan myocardial perfusion study.   No evidence of ischemia or infarct.   SDS 0.   LVEF 70%.   No ischemic changes with Regadenoson.   No arrhythmias.   No chest pain.   ECG INTERPRETATION   Negative stress ECG for ischemia.     Ziopatch 2/6/2023 - 2/9/2023  Impressions:   Predominantly sinus rhythm.   Asymptomatic, short SVT.   Symptoms associated with sinus rhythm.     Transthoracic Echo Report 2/18/2023  Prior echocardiogram 11/28/2022, patient in A fib with RVR during this study.  Patient in atrial fibrillation with RVR during the study limiting accurate interpretation.  Preserved biventricular systolic functions, estimated LVEF 55%  Dilated LA  No significant valvular abnormalities.  No pericardial effusion.  Normal IVC size.    Transesophageal Echo Report 2/19/2023  VELMA pre DCCV  No BABAR thrombus  Mild MR  Mild-moderate TR  Trace AI  No pericardial effusion     Ziopatch 3/8/2023-3/15/2023  Impressions:   Predominantly borderline sinus bradycardia with average rate 57 bpm.   Asymptomatic, short SVT.   No atrial fibrillation detected.     Transthoracic Echo Report 4/3/2023  Normal left ventricular size, thickness, systolic function, and diastolic function.  Mild mitral regurgitation.  Normal inferior vena cava  size and inspiratory collapse.  Estimated right ventricular systolic pressure is 40 mmHg.     Assessment & Plan     1. Paroxysmal A-fib (HCC)  apixaban (ELIQUIS) 2.5mg Tab      2. Secondary hypercoagulable state (HCC)  apixaban (ELIQUIS) 2.5mg Tab      3. High serum potassium level  Basic Metabolic Panel      4. High risk medication use  Basic Metabolic Panel      5. Essential hypertension        6. Coronary artery disease involving native coronary artery of native heart with angina pectoris with documented spasm (HCC)        7. Mixed hyperlipidemia            Medical Decision Making: Today's Assessment/Status/Plan:        Patient does have high potassium on her labs from yesterday  -Will repeat BMP tomorrow and follow with recommendations    Hypertension: BP stable  -Continue amlodipine 10 mg daily  -Continue Losartan 100 mg daily   -Continue Metoprolol SR 25 mg daily    Paroxysmal Atrial fibrillation:  -no afib seen on last event monitor  -Continue Metoprolol SR 25 mg daily  -Restart Eliquis 2.5 mg twice a day    CAD,  to RCA, left to right collaterals; NOEMI x2 to the RCA on 9/7/2016:  -Last LDL was 37 on 2/18/2023  -Continue atorvastatin 20 mg daily  -no aspirin due to being on OAC     S/p kidney transplant:  -Followed by [facility] and Nephrology locally  -Patient to discuss Lasix with nephrology or [facility]  -Continue CellCept, tacrolimus and prednisone as recommended    FU in clinic in 3 months with Dr. Pettit. Sooner if needed.    Patient verbalizes understanding and agrees with the plan of care.     PLEASE NOTE: This Note was created using voice recognition Software. I have made every reasonable attempt to correct obvious errors, but I expect that there are errors of grammar and possibly content that I did not discover before finalizing the note

## 2023-06-28 ENCOUNTER — HOSPITAL ENCOUNTER (OUTPATIENT)
Dept: LAB | Facility: MEDICAL CENTER | Age: 73
End: 2023-06-28
Attending: NURSE PRACTITIONER
Payer: MEDICARE

## 2023-06-28 DIAGNOSIS — E87.5 HIGH SERUM POTASSIUM LEVEL: ICD-10-CM

## 2023-06-28 DIAGNOSIS — Z79.899 HIGH RISK MEDICATION USE: ICD-10-CM

## 2023-06-28 LAB
ANION GAP SERPL CALC-SCNC: 8 MMOL/L (ref 7–16)
BUN SERPL-MCNC: 39 MG/DL (ref 8–22)
CALCIUM SERPL-MCNC: 10.3 MG/DL (ref 8.4–10.2)
CHLORIDE SERPL-SCNC: 114 MMOL/L (ref 96–112)
CO2 SERPL-SCNC: 18 MMOL/L (ref 20–33)
CREAT SERPL-MCNC: 1.23 MG/DL (ref 0.5–1.4)
FASTING STATUS PATIENT QL REPORTED: NORMAL
GFR SERPLBLD CREATININE-BSD FMLA CKD-EPI: 46 ML/MIN/1.73 M 2
GLUCOSE SERPL-MCNC: 82 MG/DL (ref 65–99)
POTASSIUM SERPL-SCNC: 5.7 MMOL/L (ref 3.6–5.5)
SODIUM SERPL-SCNC: 140 MMOL/L (ref 135–145)

## 2023-06-28 PROCEDURE — 80048 BASIC METABOLIC PNL TOTAL CA: CPT

## 2023-06-28 PROCEDURE — 36415 COLL VENOUS BLD VENIPUNCTURE: CPT

## 2023-06-28 NOTE — RESULT ENCOUNTER NOTE
Could you help me reach out to [facility] and the transplant team there to see what they want to do about patient's potassium level.  Her tacrolimus medication likely causing this, patient was seen in office and reports she is not taking any potassium supplements.  I do not want to lower some of her other medications because her blood pressure is borderline.  Do they want a diuretic to be added?

## 2023-06-29 ENCOUNTER — TELEPHONE (OUTPATIENT)
Dept: CARDIOLOGY | Facility: MEDICAL CENTER | Age: 73
End: 2023-06-29
Payer: MEDICARE

## 2023-06-29 NOTE — TELEPHONE ENCOUNTER
Message  Received: Yesterday  ANGELITA Fisher R.N.  Could you help me reach out to [facility] and the transplant team there to see what they want to do about patient's potassium level.  Her tacrolimus medication likely causing this, patient was seen in office and reports she is not taking any potassium supplements.  I do not want to lower some of her other medications because her blood pressure is borderline.  Do they want a diuretic to be added?    Associated Results     Contains abnormal data ESTIMATED GFR  -----------------------------------------------------------------------------------------------    Called [facility] at [phone], was transferred to Tim Morgan kidney transplant coordinator, CRISS asking her to call back to discuss.

## 2023-06-30 NOTE — TELEPHONE ENCOUNTER
Telephone call received from Dr. Garay to our office today.    Written message received for patient instructions for high potassium.    Sodium bicarbonate 650 mg twice a day for unknown duration    Lokelma 10 mg twice a day for unknown duration.     Call Dr. Garay on phone number [phone].  Left message to clarify directions    Does he want Lokelma 10 g twice a day for 48 hours then 10 mg daily thereafter???    And does he want her to stay on sodium bicarbonate    Please clarify the dose

## 2023-07-01 DIAGNOSIS — L29.9 ITCHING: ICD-10-CM

## 2023-07-03 ENCOUNTER — HOSPITAL ENCOUNTER (OUTPATIENT)
Dept: LAB | Facility: MEDICAL CENTER | Age: 73
End: 2023-07-03
Attending: INTERNAL MEDICINE
Payer: MEDICARE

## 2023-07-03 LAB
ALBUMIN SERPL BCP-MCNC: 3.9 G/DL (ref 3.2–4.9)
ALBUMIN/GLOB SERPL: 1.5 G/DL
ALP SERPL-CCNC: 83 U/L (ref 30–99)
ALT SERPL-CCNC: 15 U/L (ref 2–50)
ANION GAP SERPL CALC-SCNC: 9 MMOL/L (ref 7–16)
APPEARANCE UR: ABNORMAL
AST SERPL-CCNC: 15 U/L (ref 12–45)
BACTERIA #/AREA URNS HPF: NEGATIVE /HPF
BASOPHILS # BLD AUTO: 0.3 % (ref 0–1.8)
BASOPHILS # BLD: 0.01 K/UL (ref 0–0.12)
BILIRUB SERPL-MCNC: 0.3 MG/DL (ref 0.1–1.5)
BILIRUB UR QL STRIP.AUTO: NEGATIVE
BUN SERPL-MCNC: 43 MG/DL (ref 8–22)
CALCIUM ALBUM COR SERPL-MCNC: 10.1 MG/DL (ref 8.5–10.5)
CALCIUM SERPL-MCNC: 10 MG/DL (ref 8.4–10.2)
CHLORIDE SERPL-SCNC: 113 MMOL/L (ref 96–112)
CO2 SERPL-SCNC: 17 MMOL/L (ref 20–33)
COLOR UR: YELLOW
CREAT SERPL-MCNC: 1.2 MG/DL (ref 0.5–1.4)
EOSINOPHIL # BLD AUTO: 0.05 K/UL (ref 0–0.51)
EOSINOPHIL NFR BLD: 1.3 % (ref 0–6.9)
EPI CELLS #/AREA URNS HPF: NEGATIVE /HPF
ERYTHROCYTE [DISTWIDTH] IN BLOOD BY AUTOMATED COUNT: 48.8 FL (ref 35.9–50)
GFR SERPLBLD CREATININE-BSD FMLA CKD-EPI: 48 ML/MIN/1.73 M 2
GLOBULIN SER CALC-MCNC: 2.6 G/DL (ref 1.9–3.5)
GLUCOSE SERPL-MCNC: 87 MG/DL (ref 65–99)
GLUCOSE UR STRIP.AUTO-MCNC: NEGATIVE MG/DL
HCT VFR BLD AUTO: 39.6 % (ref 37–47)
HGB BLD-MCNC: 12.1 G/DL (ref 12–16)
IMM GRANULOCYTES # BLD AUTO: 0.01 K/UL (ref 0–0.11)
IMM GRANULOCYTES NFR BLD AUTO: 0.3 % (ref 0–0.9)
KETONES UR STRIP.AUTO-MCNC: NEGATIVE MG/DL
LEUKOCYTE ESTERASE UR QL STRIP.AUTO: ABNORMAL
LYMPHOCYTES # BLD AUTO: 0.82 K/UL (ref 1–4.8)
LYMPHOCYTES NFR BLD: 21.7 % (ref 22–41)
MCH RBC QN AUTO: 27.6 PG (ref 27–33)
MCHC RBC AUTO-ENTMCNC: 30.6 G/DL (ref 32.2–35.5)
MCV RBC AUTO: 90.2 FL (ref 81.4–97.8)
MICRO URNS: ABNORMAL
MONOCYTES # BLD AUTO: 0.4 K/UL (ref 0–0.85)
MONOCYTES NFR BLD AUTO: 10.6 % (ref 0–13.4)
NEUTROPHILS # BLD AUTO: 2.49 K/UL (ref 1.82–7.42)
NEUTROPHILS NFR BLD: 65.8 % (ref 44–72)
NITRITE UR QL STRIP.AUTO: NEGATIVE
NRBC # BLD AUTO: 0 K/UL
NRBC BLD-RTO: 0 /100 WBC (ref 0–0.2)
PH UR STRIP.AUTO: 5.5 [PH] (ref 5–8)
PLATELET # BLD AUTO: 94 K/UL (ref 164–446)
PMV BLD AUTO: 11.5 FL (ref 9–12.9)
POTASSIUM SERPL-SCNC: 5.3 MMOL/L (ref 3.6–5.5)
PROT SERPL-MCNC: 6.5 G/DL (ref 6–8.2)
PROT UR QL STRIP: NEGATIVE MG/DL
RBC # BLD AUTO: 4.39 M/UL (ref 4.2–5.4)
RBC # URNS HPF: ABNORMAL /HPF
RBC UR QL AUTO: ABNORMAL
SODIUM SERPL-SCNC: 139 MMOL/L (ref 135–145)
SP GR UR STRIP.AUTO: 1.01
TACROLIMUS BLD-MCNC: 4.7 NG/ML (ref 5–20)
WBC # BLD AUTO: 3.8 K/UL (ref 4.8–10.8)
WBC #/AREA URNS HPF: ABNORMAL /HPF

## 2023-07-03 PROCEDURE — 87799 DETECT AGENT NOS DNA QUANT: CPT

## 2023-07-03 PROCEDURE — 80053 COMPREHEN METABOLIC PANEL: CPT

## 2023-07-03 PROCEDURE — 85025 COMPLETE CBC W/AUTO DIFF WBC: CPT

## 2023-07-03 PROCEDURE — 36415 COLL VENOUS BLD VENIPUNCTURE: CPT

## 2023-07-03 PROCEDURE — 81001 URINALYSIS AUTO W/SCOPE: CPT

## 2023-07-03 PROCEDURE — 80197 ASSAY OF TACROLIMUS: CPT

## 2023-07-03 NOTE — TELEPHONE ENCOUNTER
Labs in chart from today, ordered by nephrologist and transplant specialist.         Note          MALGORZATA Fisher.  You 1 hour ago (9:28 AM)       Could you reach out to her local Nephrologist and see if they have suggestions.          Note

## 2023-07-03 NOTE — TELEPHONE ENCOUNTER
(Newest Message First)  View All Conversations on this Encounter  MALGORZATA Fisher.  You 1 hour ago (11:25 AM)       Potassium level improved, will defer back to nephrology and transplanting center

## 2023-07-03 NOTE — TELEPHONE ENCOUNTER
Received request via: Pharmacy    Was the patient seen in the last year in this department? Yes    Does the patient have an active prescription (recently filled or refills available) for medication(s) requested? YES    Does the patient have care home Plus and need 100 day supply (blood pressure, diabetes and cholesterol meds only)? Patient does not have SCP  Requested Prescriptions     Pending Prescriptions Disp Refills    hydrOXYzine HCl (ATARAX) 25 MG Tab [Pharmacy Med Name: HYDROXYZINE HCL 25 MG TABLET] 60 Tablet 2     Sig: TOME FERNANDO TABLETA DOS VECES AL DESMOND CUANDO SEA NECESARIO PARA LA PICAZON

## 2023-07-06 LAB
BK PLASMA INTERP, QNT NAAT NL11711: DETECTED
BK PLASMA IU/ML, QNT NAAT NL11709: 534 IU/ML
BK PLASMA LOG IU/ML, QNT NAAT NL11710: 2.73 LOG IU/ML
BK UR INTERP, QNT NAAT NL11708: DETECTED
BK UR IU/ML, QNT NAAT NL11706: ABNORMAL IU/ML
BK UR LOG IU/ML, QNT NAAT NL11707: 6.39 LOG IU/ML

## 2023-07-06 RX ORDER — HYDROXYZINE HYDROCHLORIDE 25 MG/1
TABLET, FILM COATED ORAL
Qty: 60 TABLET | Refills: 2 | Status: SHIPPED | OUTPATIENT
Start: 2023-07-06 | End: 2023-08-03

## 2023-07-10 ENCOUNTER — HOSPITAL ENCOUNTER (OUTPATIENT)
Dept: LAB | Facility: MEDICAL CENTER | Age: 73
End: 2023-07-10
Attending: INTERNAL MEDICINE
Payer: MEDICARE

## 2023-07-10 LAB
ANION GAP SERPL CALC-SCNC: 8 MMOL/L (ref 7–16)
BASOPHILS # BLD AUTO: 0.5 % (ref 0–1.8)
BASOPHILS # BLD: 0.02 K/UL (ref 0–0.12)
BUN SERPL-MCNC: 40 MG/DL (ref 8–22)
CALCIUM SERPL-MCNC: 10.3 MG/DL (ref 8.4–10.2)
CHLORIDE SERPL-SCNC: 111 MMOL/L (ref 96–112)
CO2 SERPL-SCNC: 17 MMOL/L (ref 20–33)
CREAT SERPL-MCNC: 1.24 MG/DL (ref 0.5–1.4)
EOSINOPHIL # BLD AUTO: 0.04 K/UL (ref 0–0.51)
EOSINOPHIL NFR BLD: 1.1 % (ref 0–6.9)
ERYTHROCYTE [DISTWIDTH] IN BLOOD BY AUTOMATED COUNT: 50.5 FL (ref 35.9–50)
GFR SERPLBLD CREATININE-BSD FMLA CKD-EPI: 46 ML/MIN/1.73 M 2
GLUCOSE SERPL-MCNC: 91 MG/DL (ref 65–99)
HCT VFR BLD AUTO: 40.5 % (ref 37–47)
HGB BLD-MCNC: 12.4 G/DL (ref 12–16)
IMM GRANULOCYTES # BLD AUTO: 0.02 K/UL (ref 0–0.11)
IMM GRANULOCYTES NFR BLD AUTO: 0.5 % (ref 0–0.9)
LYMPHOCYTES # BLD AUTO: 0.87 K/UL (ref 1–4.8)
LYMPHOCYTES NFR BLD: 23.4 % (ref 22–41)
MCH RBC QN AUTO: 27.6 PG (ref 27–33)
MCHC RBC AUTO-ENTMCNC: 30.6 G/DL (ref 32.2–35.5)
MCV RBC AUTO: 90 FL (ref 81.4–97.8)
MONOCYTES # BLD AUTO: 0.38 K/UL (ref 0–0.85)
MONOCYTES NFR BLD AUTO: 10.2 % (ref 0–13.4)
NEUTROPHILS # BLD AUTO: 2.39 K/UL (ref 1.82–7.42)
NEUTROPHILS NFR BLD: 64.3 % (ref 44–72)
NRBC # BLD AUTO: 0 K/UL
NRBC BLD-RTO: 0 /100 WBC (ref 0–0.2)
PHOSPHATE SERPL-MCNC: 2.9 MG/DL (ref 2.5–4.5)
PLATELET # BLD AUTO: 107 K/UL (ref 164–446)
PMV BLD AUTO: 11.8 FL (ref 9–12.9)
POTASSIUM SERPL-SCNC: 5.8 MMOL/L (ref 3.6–5.5)
RBC # BLD AUTO: 4.5 M/UL (ref 4.2–5.4)
SODIUM SERPL-SCNC: 136 MMOL/L (ref 135–145)
TACROLIMUS BLD-MCNC: 6.6 NG/ML (ref 5–20)
WBC # BLD AUTO: 3.7 K/UL (ref 4.8–10.8)

## 2023-07-10 PROCEDURE — 36415 COLL VENOUS BLD VENIPUNCTURE: CPT

## 2023-07-10 PROCEDURE — 85025 COMPLETE CBC W/AUTO DIFF WBC: CPT

## 2023-07-10 PROCEDURE — 80048 BASIC METABOLIC PNL TOTAL CA: CPT

## 2023-07-10 PROCEDURE — 84100 ASSAY OF PHOSPHORUS: CPT

## 2023-07-10 PROCEDURE — 80197 ASSAY OF TACROLIMUS: CPT

## 2023-07-17 ENCOUNTER — HOSPITAL ENCOUNTER (OUTPATIENT)
Dept: LAB | Facility: MEDICAL CENTER | Age: 73
End: 2023-07-17
Attending: INTERNAL MEDICINE
Payer: MEDICARE

## 2023-07-17 LAB
ANION GAP SERPL CALC-SCNC: 8 MMOL/L (ref 7–16)
BASOPHILS # BLD AUTO: 0.5 % (ref 0–1.8)
BASOPHILS # BLD: 0.02 K/UL (ref 0–0.12)
BUN SERPL-MCNC: 35 MG/DL (ref 8–22)
CALCIUM SERPL-MCNC: 10.1 MG/DL (ref 8.4–10.2)
CHLORIDE SERPL-SCNC: 112 MMOL/L (ref 96–112)
CO2 SERPL-SCNC: 19 MMOL/L (ref 20–33)
CREAT SERPL-MCNC: 1.32 MG/DL (ref 0.5–1.4)
EOSINOPHIL # BLD AUTO: 0.04 K/UL (ref 0–0.51)
EOSINOPHIL NFR BLD: 1 % (ref 0–6.9)
ERYTHROCYTE [DISTWIDTH] IN BLOOD BY AUTOMATED COUNT: 50.9 FL (ref 35.9–50)
GFR SERPLBLD CREATININE-BSD FMLA CKD-EPI: 42 ML/MIN/1.73 M 2
GLUCOSE SERPL-MCNC: 97 MG/DL (ref 65–99)
HCT VFR BLD AUTO: 40.6 % (ref 37–47)
HGB BLD-MCNC: 12.6 G/DL (ref 12–16)
IMM GRANULOCYTES # BLD AUTO: 0.03 K/UL (ref 0–0.11)
IMM GRANULOCYTES NFR BLD AUTO: 0.8 % (ref 0–0.9)
LYMPHOCYTES # BLD AUTO: 0.77 K/UL (ref 1–4.8)
LYMPHOCYTES NFR BLD: 19.4 % (ref 22–41)
MCH RBC QN AUTO: 27.9 PG (ref 27–33)
MCHC RBC AUTO-ENTMCNC: 31 G/DL (ref 32.2–35.5)
MCV RBC AUTO: 89.8 FL (ref 81.4–97.8)
MONOCYTES # BLD AUTO: 0.38 K/UL (ref 0–0.85)
MONOCYTES NFR BLD AUTO: 9.6 % (ref 0–13.4)
NEUTROPHILS # BLD AUTO: 2.73 K/UL (ref 1.82–7.42)
NEUTROPHILS NFR BLD: 68.7 % (ref 44–72)
NRBC # BLD AUTO: 0 K/UL
NRBC BLD-RTO: 0 /100 WBC (ref 0–0.2)
PHOSPHATE SERPL-MCNC: 3.1 MG/DL (ref 2.5–4.5)
PLATELET # BLD AUTO: 95 K/UL (ref 164–446)
PLATELETS.RETICULATED NFR BLD AUTO: 3.8 % (ref 0.6–13.1)
PMV BLD AUTO: 11.1 FL (ref 9–12.9)
POTASSIUM SERPL-SCNC: 5.6 MMOL/L (ref 3.6–5.5)
RBC # BLD AUTO: 4.52 M/UL (ref 4.2–5.4)
SODIUM SERPL-SCNC: 139 MMOL/L (ref 135–145)
TACROLIMUS BLD-MCNC: 4.6 NG/ML (ref 5–20)
WBC # BLD AUTO: 4 K/UL (ref 4.8–10.8)

## 2023-07-17 PROCEDURE — 36415 COLL VENOUS BLD VENIPUNCTURE: CPT

## 2023-07-17 PROCEDURE — 85025 COMPLETE CBC W/AUTO DIFF WBC: CPT

## 2023-07-17 PROCEDURE — 80197 ASSAY OF TACROLIMUS: CPT

## 2023-07-17 PROCEDURE — 80048 BASIC METABOLIC PNL TOTAL CA: CPT

## 2023-07-17 PROCEDURE — 84100 ASSAY OF PHOSPHORUS: CPT

## 2023-07-17 PROCEDURE — 85055 RETICULATED PLATELET ASSAY: CPT

## 2023-07-21 ENCOUNTER — HOSPITAL ENCOUNTER (OUTPATIENT)
Dept: LAB | Facility: MEDICAL CENTER | Age: 73
End: 2023-07-21
Attending: UROLOGY
Payer: MEDICARE

## 2023-07-21 LAB
ALBUMIN SERPL BCP-MCNC: 4.2 G/DL (ref 3.2–4.9)
ALBUMIN/GLOB SERPL: 1.6 G/DL
ALP SERPL-CCNC: 82 U/L (ref 30–99)
ALT SERPL-CCNC: 16 U/L (ref 2–50)
ANION GAP SERPL CALC-SCNC: 8 MMOL/L (ref 7–16)
AST SERPL-CCNC: 18 U/L (ref 12–45)
BASOPHILS # BLD AUTO: 0.4 % (ref 0–1.8)
BASOPHILS # BLD: 0.02 K/UL (ref 0–0.12)
BILIRUB SERPL-MCNC: 0.3 MG/DL (ref 0.1–1.5)
BUN SERPL-MCNC: 32 MG/DL (ref 8–22)
CALCIUM ALBUM COR SERPL-MCNC: 10.3 MG/DL (ref 8.5–10.5)
CALCIUM SERPL-MCNC: 10.5 MG/DL (ref 8.4–10.2)
CHLORIDE SERPL-SCNC: 110 MMOL/L (ref 96–112)
CO2 SERPL-SCNC: 21 MMOL/L (ref 20–33)
CREAT SERPL-MCNC: 1.56 MG/DL (ref 0.5–1.4)
EOSINOPHIL # BLD AUTO: 0.03 K/UL (ref 0–0.51)
EOSINOPHIL NFR BLD: 0.6 % (ref 0–6.9)
ERYTHROCYTE [DISTWIDTH] IN BLOOD BY AUTOMATED COUNT: 50.7 FL (ref 35.9–50)
FASTING STATUS PATIENT QL REPORTED: NORMAL
GFR SERPLBLD CREATININE-BSD FMLA CKD-EPI: 35 ML/MIN/1.73 M 2
GLOBULIN SER CALC-MCNC: 2.6 G/DL (ref 1.9–3.5)
GLUCOSE SERPL-MCNC: 171 MG/DL (ref 65–99)
HCT VFR BLD AUTO: 43.2 % (ref 37–47)
HGB BLD-MCNC: 13.2 G/DL (ref 12–16)
IMM GRANULOCYTES # BLD AUTO: 0.03 K/UL (ref 0–0.11)
IMM GRANULOCYTES NFR BLD AUTO: 0.6 % (ref 0–0.9)
INR PPP: 0.97 (ref 0.87–1.13)
LYMPHOCYTES # BLD AUTO: 0.65 K/UL (ref 1–4.8)
LYMPHOCYTES NFR BLD: 12.5 % (ref 22–41)
MCH RBC QN AUTO: 27.6 PG (ref 27–33)
MCHC RBC AUTO-ENTMCNC: 30.6 G/DL (ref 32.2–35.5)
MCV RBC AUTO: 90.2 FL (ref 81.4–97.8)
MONOCYTES # BLD AUTO: 0.36 K/UL (ref 0–0.85)
MONOCYTES NFR BLD AUTO: 6.9 % (ref 0–13.4)
NEUTROPHILS # BLD AUTO: 4.13 K/UL (ref 1.82–7.42)
NEUTROPHILS NFR BLD: 79 % (ref 44–72)
NRBC # BLD AUTO: 0 K/UL
NRBC BLD-RTO: 0 /100 WBC (ref 0–0.2)
PLATELET # BLD AUTO: 98 K/UL (ref 164–446)
PMV BLD AUTO: 11.8 FL (ref 9–12.9)
POTASSIUM SERPL-SCNC: 5.3 MMOL/L (ref 3.6–5.5)
PROT SERPL-MCNC: 6.8 G/DL (ref 6–8.2)
PROTHROMBIN TIME: 13.3 SEC (ref 12–14.6)
RBC # BLD AUTO: 4.79 M/UL (ref 4.2–5.4)
SODIUM SERPL-SCNC: 139 MMOL/L (ref 135–145)
WBC # BLD AUTO: 5.2 K/UL (ref 4.8–10.8)

## 2023-07-21 PROCEDURE — 85025 COMPLETE CBC W/AUTO DIFF WBC: CPT

## 2023-07-21 PROCEDURE — 80053 COMPREHEN METABOLIC PANEL: CPT

## 2023-07-21 PROCEDURE — 87086 URINE CULTURE/COLONY COUNT: CPT

## 2023-07-21 PROCEDURE — 85610 PROTHROMBIN TIME: CPT

## 2023-07-21 PROCEDURE — 36415 COLL VENOUS BLD VENIPUNCTURE: CPT

## 2023-07-23 LAB
BACTERIA UR CULT: NORMAL
SIGNIFICANT IND 70042: NORMAL
SITE SITE: NORMAL
SOURCE SOURCE: NORMAL

## 2023-07-24 ENCOUNTER — HOSPITAL ENCOUNTER (OUTPATIENT)
Dept: LAB | Facility: MEDICAL CENTER | Age: 73
End: 2023-07-24
Payer: MEDICARE

## 2023-07-24 LAB
ANION GAP SERPL CALC-SCNC: 9 MMOL/L (ref 7–16)
BASOPHILS # BLD AUTO: 0.6 % (ref 0–1.8)
BASOPHILS # BLD: 0.02 K/UL (ref 0–0.12)
BUN SERPL-MCNC: 39 MG/DL (ref 8–22)
CALCIUM SERPL-MCNC: 10.4 MG/DL (ref 8.4–10.2)
CHLORIDE SERPL-SCNC: 111 MMOL/L (ref 96–112)
CO2 SERPL-SCNC: 18 MMOL/L (ref 20–33)
CREAT SERPL-MCNC: 1.18 MG/DL (ref 0.5–1.4)
EOSINOPHIL # BLD AUTO: 0.03 K/UL (ref 0–0.51)
EOSINOPHIL NFR BLD: 0.9 % (ref 0–6.9)
ERYTHROCYTE [DISTWIDTH] IN BLOOD BY AUTOMATED COUNT: 50.5 FL (ref 35.9–50)
GFR SERPLBLD CREATININE-BSD FMLA CKD-EPI: 49 ML/MIN/1.73 M 2
GLUCOSE SERPL-MCNC: 84 MG/DL (ref 65–99)
HCT VFR BLD AUTO: 41.5 % (ref 37–47)
HGB BLD-MCNC: 12.9 G/DL (ref 12–16)
IMM GRANULOCYTES # BLD AUTO: 0.01 K/UL (ref 0–0.11)
IMM GRANULOCYTES NFR BLD AUTO: 0.3 % (ref 0–0.9)
LYMPHOCYTES # BLD AUTO: 0.78 K/UL (ref 1–4.8)
LYMPHOCYTES NFR BLD: 22.5 % (ref 22–41)
MCH RBC QN AUTO: 27.9 PG (ref 27–33)
MCHC RBC AUTO-ENTMCNC: 31.1 G/DL (ref 32.2–35.5)
MCV RBC AUTO: 89.8 FL (ref 81.4–97.8)
MONOCYTES # BLD AUTO: 0.37 K/UL (ref 0–0.85)
MONOCYTES NFR BLD AUTO: 10.7 % (ref 0–13.4)
NEUTROPHILS # BLD AUTO: 2.26 K/UL (ref 1.82–7.42)
NEUTROPHILS NFR BLD: 65 % (ref 44–72)
NRBC # BLD AUTO: 0 K/UL
NRBC BLD-RTO: 0 /100 WBC (ref 0–0.2)
PHOSPHATE SERPL-MCNC: 3.3 MG/DL (ref 2.5–4.5)
PLATELET # BLD AUTO: 94 K/UL (ref 164–446)
PLATELETS.RETICULATED NFR BLD AUTO: 4.4 % (ref 0.6–13.1)
PMV BLD AUTO: 10.4 FL (ref 9–12.9)
POTASSIUM SERPL-SCNC: 5.7 MMOL/L (ref 3.6–5.5)
RBC # BLD AUTO: 4.62 M/UL (ref 4.2–5.4)
SODIUM SERPL-SCNC: 138 MMOL/L (ref 135–145)
TACROLIMUS BLD-MCNC: 4.1 NG/ML (ref 5–20)
WBC # BLD AUTO: 3.5 K/UL (ref 4.8–10.8)

## 2023-07-24 PROCEDURE — 85025 COMPLETE CBC W/AUTO DIFF WBC: CPT

## 2023-07-24 PROCEDURE — 80048 BASIC METABOLIC PNL TOTAL CA: CPT

## 2023-07-24 PROCEDURE — 36415 COLL VENOUS BLD VENIPUNCTURE: CPT

## 2023-07-24 PROCEDURE — 84100 ASSAY OF PHOSPHORUS: CPT

## 2023-07-24 PROCEDURE — 80197 ASSAY OF TACROLIMUS: CPT

## 2023-07-24 PROCEDURE — 85055 RETICULATED PLATELET ASSAY: CPT

## 2023-07-26 ENCOUNTER — HOSPITAL ENCOUNTER (OUTPATIENT)
Dept: RADIOLOGY | Facility: MEDICAL CENTER | Age: 73
End: 2023-07-26
Attending: NURSE PRACTITIONER
Payer: MEDICARE

## 2023-07-26 DIAGNOSIS — Z79.52 CURRENT CHRONIC USE OF SYSTEMIC STEROIDS: ICD-10-CM

## 2023-07-26 DIAGNOSIS — Z78.0 POSTMENOPAUSAL: ICD-10-CM

## 2023-07-26 DIAGNOSIS — E03.9 ACQUIRED HYPOTHYROIDISM: ICD-10-CM

## 2023-07-26 PROCEDURE — 77080 DXA BONE DENSITY AXIAL: CPT

## 2023-07-27 ENCOUNTER — HOSPITAL ENCOUNTER (OUTPATIENT)
Dept: RADIOLOGY | Facility: MEDICAL CENTER | Age: 73
End: 2023-07-27
Attending: UROLOGY
Payer: MEDICARE

## 2023-07-27 DIAGNOSIS — Z01.818 PRE-PROCEDURAL EXAMINATION: ICD-10-CM

## 2023-07-27 PROCEDURE — 71046 X-RAY EXAM CHEST 2 VIEWS: CPT

## 2023-07-28 ENCOUNTER — TELEPHONE (OUTPATIENT)
Dept: CARDIOLOGY | Facility: MEDICAL CENTER | Age: 73
End: 2023-07-28
Payer: MEDICARE

## 2023-07-28 ENCOUNTER — NON-PROVIDER VISIT (OUTPATIENT)
Dept: CARDIOLOGY | Facility: MEDICAL CENTER | Age: 73
End: 2023-07-28
Attending: INTERNAL MEDICINE
Payer: MEDICARE

## 2023-07-28 ENCOUNTER — HOSPITAL ENCOUNTER (OUTPATIENT)
Dept: LAB | Facility: MEDICAL CENTER | Age: 73
End: 2023-07-28
Attending: UROLOGY
Payer: MEDICARE

## 2023-07-28 DIAGNOSIS — Z01.810 PRE-OPERATIVE CARDIOVASCULAR EXAMINATION: ICD-10-CM

## 2023-07-28 LAB
ALBUMIN SERPL BCP-MCNC: 4.2 G/DL (ref 3.2–4.9)
ALBUMIN/GLOB SERPL: 1.7 G/DL
ALP SERPL-CCNC: 94 U/L (ref 30–99)
ALT SERPL-CCNC: 16 U/L (ref 2–50)
ANION GAP SERPL CALC-SCNC: 10 MMOL/L (ref 7–16)
AST SERPL-CCNC: 18 U/L (ref 12–45)
BASOPHILS # BLD AUTO: 0.2 % (ref 0–1.8)
BASOPHILS # BLD: 0.01 K/UL (ref 0–0.12)
BILIRUB SERPL-MCNC: 0.3 MG/DL (ref 0.1–1.5)
BUN SERPL-MCNC: 46 MG/DL (ref 8–22)
CALCIUM ALBUM COR SERPL-MCNC: 10.1 MG/DL (ref 8.5–10.5)
CALCIUM SERPL-MCNC: 10.3 MG/DL (ref 8.4–10.2)
CHLORIDE SERPL-SCNC: 109 MMOL/L (ref 96–112)
CO2 SERPL-SCNC: 20 MMOL/L (ref 20–33)
CREAT SERPL-MCNC: 1.27 MG/DL (ref 0.5–1.4)
EKG IMPRESSION: NORMAL
EOSINOPHIL # BLD AUTO: 0.03 K/UL (ref 0–0.51)
EOSINOPHIL NFR BLD: 0.7 % (ref 0–6.9)
ERYTHROCYTE [DISTWIDTH] IN BLOOD BY AUTOMATED COUNT: 50.5 FL (ref 35.9–50)
FASTING STATUS PATIENT QL REPORTED: NORMAL
GFR SERPLBLD CREATININE-BSD FMLA CKD-EPI: 44 ML/MIN/1.73 M 2
GLOBULIN SER CALC-MCNC: 2.5 G/DL (ref 1.9–3.5)
GLUCOSE SERPL-MCNC: 151 MG/DL (ref 65–99)
HCT VFR BLD AUTO: 43 % (ref 37–47)
HGB BLD-MCNC: 13.3 G/DL (ref 12–16)
IMM GRANULOCYTES # BLD AUTO: 0.01 K/UL (ref 0–0.11)
IMM GRANULOCYTES NFR BLD AUTO: 0.2 % (ref 0–0.9)
INR PPP: 0.98 (ref 0.87–1.13)
LYMPHOCYTES # BLD AUTO: 0.69 K/UL (ref 1–4.8)
LYMPHOCYTES NFR BLD: 15.3 % (ref 22–41)
MCH RBC QN AUTO: 27.9 PG (ref 27–33)
MCHC RBC AUTO-ENTMCNC: 30.9 G/DL (ref 32.2–35.5)
MCV RBC AUTO: 90.1 FL (ref 81.4–97.8)
MONOCYTES # BLD AUTO: 0.47 K/UL (ref 0–0.85)
MONOCYTES NFR BLD AUTO: 10.4 % (ref 0–13.4)
NEUTROPHILS # BLD AUTO: 3.29 K/UL (ref 1.82–7.42)
NEUTROPHILS NFR BLD: 73.2 % (ref 44–72)
NRBC # BLD AUTO: 0 K/UL
NRBC BLD-RTO: 0 /100 WBC (ref 0–0.2)
PLATELET # BLD AUTO: 83 K/UL (ref 164–446)
PMV BLD AUTO: 11.8 FL (ref 9–12.9)
POTASSIUM SERPL-SCNC: 5.1 MMOL/L (ref 3.6–5.5)
PROT SERPL-MCNC: 6.7 G/DL (ref 6–8.2)
PROTHROMBIN TIME: 13.3 SEC (ref 12–14.6)
RBC # BLD AUTO: 4.77 M/UL (ref 4.2–5.4)
SODIUM SERPL-SCNC: 139 MMOL/L (ref 135–145)
WBC # BLD AUTO: 4.5 K/UL (ref 4.8–10.8)

## 2023-07-28 PROCEDURE — 87077 CULTURE AEROBIC IDENTIFY: CPT

## 2023-07-28 PROCEDURE — 80053 COMPREHEN METABOLIC PANEL: CPT

## 2023-07-28 PROCEDURE — 93005 ELECTROCARDIOGRAM TRACING: CPT

## 2023-07-28 PROCEDURE — 87086 URINE CULTURE/COLONY COUNT: CPT

## 2023-07-28 PROCEDURE — 85025 COMPLETE CBC W/AUTO DIFF WBC: CPT

## 2023-07-28 PROCEDURE — 36415 COLL VENOUS BLD VENIPUNCTURE: CPT

## 2023-07-28 PROCEDURE — 93010 ELECTROCARDIOGRAM REPORT: CPT | Performed by: INTERNAL MEDICINE

## 2023-07-28 PROCEDURE — 85610 PROTHROMBIN TIME: CPT

## 2023-07-28 NOTE — TELEPHONE ENCOUNTER
"Noted below:  Received: Today  Alexis Muir Ass't  CHEL Whittington  I sent a message to Cecille she was in for a EKG and Dr. Perry looked at it and said that she should follow up with Leidy.     Voalte to DOE for clarification. Voalte message \"EKG shows T Wave changes concerning for Ischemia. However, looking back at prior EKG's shows that they were present in Feb 2023\".  Okay to follow up as scheduled in 3 months. \"Yes should be fine to see Leidy then. She had stress test a few months ago which didn't show ischemia\"          "

## 2023-07-28 NOTE — PROGRESS NOTES
Patient was here today for EKG per Piotr. EKG performed and transferred into patients chart. Contacted for Leidy's nurse Cecille for pre op.

## 2023-07-30 LAB
BACTERIA UR CULT: NORMAL
SIGNIFICANT IND 70042: NORMAL
SITE SITE: NORMAL
SOURCE SOURCE: NORMAL

## 2023-08-03 ENCOUNTER — TELEPHONE (OUTPATIENT)
Dept: NEPHROLOGY | Facility: MEDICAL CENTER | Age: 73
End: 2023-08-03
Payer: MEDICARE

## 2023-08-03 DIAGNOSIS — L29.9 ITCHING: ICD-10-CM

## 2023-08-03 RX ORDER — HYDROXYZINE HYDROCHLORIDE 25 MG/1
TABLET, FILM COATED ORAL
Qty: 30 TABLET | Refills: 5 | Status: ON HOLD | OUTPATIENT
Start: 2023-08-03 | End: 2023-09-25

## 2023-08-03 NOTE — TELEPHONE ENCOUNTER
Received request via: Pharmacy    Was the patient seen in the last year in this department? Yes    Does the patient have an active prescription (recently filled or refills available) for medication(s) requested? yes    Does the patient have long term Plus and need 100 day supply (blood pressure, diabetes and cholesterol meds only)? Patient does not have SCP   Requested Prescriptions     Pending Prescriptions Disp Refills    hydrOXYzine HCl (ATARAX) 25 MG Tab [Pharmacy Med Name: HYDROXYZINE HCL 25 MG TABLET] 30 Tablet 5     Sig: TOME FERNANDO TABLETA DOS VECES AL DESMOND CUANDO SEA NECESARIO PARA LA PICAZON

## 2023-08-07 ENCOUNTER — APPOINTMENT (OUTPATIENT)
Dept: VASCULAR LAB | Facility: MEDICAL CENTER | Age: 73
End: 2023-08-07
Attending: INTERNAL MEDICINE
Payer: MEDICARE

## 2023-08-08 ENCOUNTER — APPOINTMENT (OUTPATIENT)
Dept: NEPHROLOGY | Facility: MEDICAL CENTER | Age: 73
End: 2023-08-08
Payer: MEDICARE

## 2023-08-10 ENCOUNTER — TELEPHONE (OUTPATIENT)
Dept: MEDICAL GROUP | Facility: MEDICAL CENTER | Age: 73
End: 2023-08-10
Payer: MEDICARE

## 2023-08-10 DIAGNOSIS — Z94.0 KIDNEY TRANSPLANT RECIPIENT: Chronic | ICD-10-CM

## 2023-08-10 DIAGNOSIS — E11.69 TYPE 2 DIABETES MELLITUS WITH OTHER SPECIFIED COMPLICATION, WITH LONG-TERM CURRENT USE OF INSULIN (HCC): ICD-10-CM

## 2023-08-10 DIAGNOSIS — Z79.4 TYPE 2 DIABETES MELLITUS WITH OTHER SPECIFIED COMPLICATION, WITH LONG-TERM CURRENT USE OF INSULIN (HCC): ICD-10-CM

## 2023-08-10 DIAGNOSIS — N18.6 ESRD (END STAGE RENAL DISEASE) (HCC): Chronic | ICD-10-CM

## 2023-08-10 DIAGNOSIS — N18.31 STAGE 3A CHRONIC KIDNEY DISEASE: ICD-10-CM

## 2023-08-10 DIAGNOSIS — R53.81 DEBILITY: ICD-10-CM

## 2023-08-10 NOTE — TELEPHONE ENCOUNTER
Libertad from [facility]  Received a referral from [facility] she is discharging  They need a following physician and wanted to make sure you are okay to sign incoming orders.       Pt also needs a referral for nursing     [phone] Libertad number to call back

## 2023-08-15 ENCOUNTER — TELEPHONE (OUTPATIENT)
Dept: MEDICAL GROUP | Facility: MEDICAL CENTER | Age: 73
End: 2023-08-15

## 2023-08-15 NOTE — TELEPHONE ENCOUNTER
Anabela from [facility]   Office #[phone] cell phone [phone]     States pt has been admitted today to home care     They just wanted to let you know

## 2023-08-16 NOTE — TELEPHONE ENCOUNTER
Pt decided to go with [facility] when I called them back they informed me she is no longer under their care

## 2023-08-18 ENCOUNTER — APPOINTMENT (OUTPATIENT)
Dept: VASCULAR LAB | Facility: MEDICAL CENTER | Age: 73
End: 2023-08-18
Payer: MEDICARE

## 2023-08-18 NOTE — ED NOTES
Reports feeling better   The patient was here for a vision care examination. There were no untoward findings noted. Repeat evaluation in one to two years is indicated. pleasant, well nourished, well developed, in no acute distress , normal communication ability

## 2023-08-25 ENCOUNTER — ANTICOAGULATION VISIT (OUTPATIENT)
Dept: VASCULAR LAB | Facility: MEDICAL CENTER | Age: 73
End: 2023-08-25
Attending: INTERNAL MEDICINE
Payer: MEDICARE

## 2023-08-25 VITALS — HEART RATE: 62 BPM | DIASTOLIC BLOOD PRESSURE: 73 MMHG | SYSTOLIC BLOOD PRESSURE: 144 MMHG

## 2023-08-25 DIAGNOSIS — I48.0 PAROXYSMAL A-FIB (HCC): ICD-10-CM

## 2023-08-25 DIAGNOSIS — D68.69 SECONDARY HYPERCOAGULABLE STATE (HCC): ICD-10-CM

## 2023-08-25 PROCEDURE — 3078F DIAST BP <80 MM HG: CPT

## 2023-08-25 PROCEDURE — 3077F SYST BP >= 140 MM HG: CPT

## 2023-08-25 PROCEDURE — 99212 OFFICE O/P EST SF 10 MIN: CPT

## 2023-08-25 NOTE — PROGRESS NOTES
Diagnosis: AF  Drug: Eliquis 2.5 mg BID (low dose per cardiology)  LOT: Indefinite  CHADSVASC: 5  HAS-BLED: 2-3 (age, asa, ckd s/p transplant/chronically low H/h, platelets)    Health Status Since Last Assessment  Patient has had recent procedure involving R-ureteral stent placement done on 8/18 in [place] and currently complains of pain around the incision site but does not report any abnormal bleeding or opening of the incision site. Advised patient and family to visit the ED if pain becomes unbearable or incision site reopens.    Additionally, patient complains of recent dysuria and urinary frequency. Encouraged patient and family to seek medical attention due to the setting of having a recent kidney transplant.     Adherence with DOAC Therapy   Pt reports to be taking her Eliquis once daily instead of BID.    Bleeding Risk Assessment     Denies Epistaxis   Pt denies any excessive or unusual bleeding/hematomas.  Pt denies any GI bleeds or hematemesis.  Pt denies any concerning daily headache or sub dural hematoma symptoms.     Pt denies any hematuria    Latest Hemoglobin 10.1 (8/12/23)   ETOH overuse N/A     Creatinine Clearance/Renal Function     Latest ClCr 36 mL/min (Scr 1.27 7/28/23)    Hepatic function   Latest LFTs WNL   Pt denies any history of liver dysfunction      Drug Interactions   Platelets: 108 - this seems to be trending upwards.   ASA/other antiplatelets N/A   NSAID N/A   Other drug interactions N/A   Verified no anticonvulsant or azole therapy, education provided for future use.     Examination   Blood Pressure 144/73   Symptomatic hypotension N/A   Significant gait impairment/imbalance/high risk for falls? N/A    Final Assessment and Recommendations:    Patient's recent labs show normocytic anemia, however, based on previous notes this seems to be a chronic issue.   Follow up CBC/CMP have been ordered.  Benefits of continued DOAC therapy outweigh risks for this patient  Recommend pt  continue with current DOAC therapy Eliquis 2.5 mg BID         Other Actions: cmp/cbc hemogram ordered prior to next visit    Follow up:  Will follow up with patient 3  months.     Angela Mandujano, DominiqueD

## 2023-08-29 ENCOUNTER — HOSPITAL ENCOUNTER (OUTPATIENT)
Dept: LAB | Facility: MEDICAL CENTER | Age: 73
End: 2023-08-29
Attending: INTERNAL MEDICINE
Payer: MEDICARE

## 2023-08-29 LAB
ANION GAP SERPL CALC-SCNC: 9 MMOL/L (ref 7–16)
BASOPHILS # BLD AUTO: 0.3 % (ref 0–1.8)
BASOPHILS # BLD: 0.02 K/UL (ref 0–0.12)
BUN SERPL-MCNC: 28 MG/DL (ref 8–22)
CALCIUM SERPL-MCNC: 10.2 MG/DL (ref 8.4–10.2)
CHLORIDE SERPL-SCNC: 106 MMOL/L (ref 96–112)
CO2 SERPL-SCNC: 23 MMOL/L (ref 20–33)
CREAT SERPL-MCNC: 1.17 MG/DL (ref 0.5–1.4)
EOSINOPHIL # BLD AUTO: 0.05 K/UL (ref 0–0.51)
EOSINOPHIL NFR BLD: 0.8 % (ref 0–6.9)
ERYTHROCYTE [DISTWIDTH] IN BLOOD BY AUTOMATED COUNT: 48.1 FL (ref 35.9–50)
GFR SERPLBLD CREATININE-BSD FMLA CKD-EPI: 49 ML/MIN/1.73 M 2
GLUCOSE SERPL-MCNC: 106 MG/DL (ref 65–99)
HCT VFR BLD AUTO: 36.1 % (ref 37–47)
HGB BLD-MCNC: 11 G/DL (ref 12–16)
IMM GRANULOCYTES # BLD AUTO: 0.04 K/UL (ref 0–0.11)
IMM GRANULOCYTES NFR BLD AUTO: 0.7 % (ref 0–0.9)
LYMPHOCYTES # BLD AUTO: 0.92 K/UL (ref 1–4.8)
LYMPHOCYTES NFR BLD: 15.2 % (ref 22–41)
MCH RBC QN AUTO: 27.6 PG (ref 27–33)
MCHC RBC AUTO-ENTMCNC: 30.5 G/DL (ref 32.2–35.5)
MCV RBC AUTO: 90.5 FL (ref 81.4–97.8)
MONOCYTES # BLD AUTO: 0.55 K/UL (ref 0–0.85)
MONOCYTES NFR BLD AUTO: 9.1 % (ref 0–13.4)
NEUTROPHILS # BLD AUTO: 4.49 K/UL (ref 1.82–7.42)
NEUTROPHILS NFR BLD: 73.9 % (ref 44–72)
NRBC # BLD AUTO: 0 K/UL
NRBC BLD-RTO: 0 /100 WBC (ref 0–0.2)
PHOSPHATE SERPL-MCNC: 2.6 MG/DL (ref 2.5–4.5)
PLATELET # BLD AUTO: 185 K/UL (ref 164–446)
PMV BLD AUTO: 11.5 FL (ref 9–12.9)
POTASSIUM SERPL-SCNC: 5.3 MMOL/L (ref 3.6–5.5)
RBC # BLD AUTO: 3.99 M/UL (ref 4.2–5.4)
SODIUM SERPL-SCNC: 138 MMOL/L (ref 135–145)
TACROLIMUS BLD-MCNC: 3.5 NG/ML (ref 5–20)
WBC # BLD AUTO: 6.1 K/UL (ref 4.8–10.8)

## 2023-08-29 PROCEDURE — 84100 ASSAY OF PHOSPHORUS: CPT

## 2023-08-29 PROCEDURE — 80048 BASIC METABOLIC PNL TOTAL CA: CPT

## 2023-08-29 PROCEDURE — 85025 COMPLETE CBC W/AUTO DIFF WBC: CPT

## 2023-08-29 PROCEDURE — 36415 COLL VENOUS BLD VENIPUNCTURE: CPT

## 2023-08-29 PROCEDURE — 80197 ASSAY OF TACROLIMUS: CPT

## 2023-08-30 ENCOUNTER — TELEPHONE (OUTPATIENT)
Dept: MEDICAL GROUP | Facility: MEDICAL CENTER | Age: 73
End: 2023-08-30
Payer: MEDICARE

## 2023-08-30 NOTE — TELEPHONE ENCOUNTER
Nurse with [facility] called that pt is requesting more norco, informed her that pt will need an appt I Will contact her son Manoj to schedule.

## 2023-09-04 ENCOUNTER — APPOINTMENT (OUTPATIENT)
Dept: RADIOLOGY | Facility: MEDICAL CENTER | Age: 73
DRG: 309 | End: 2023-09-04
Attending: STUDENT IN AN ORGANIZED HEALTH CARE EDUCATION/TRAINING PROGRAM
Payer: MEDICARE

## 2023-09-04 ENCOUNTER — HOSPITAL ENCOUNTER (INPATIENT)
Facility: MEDICAL CENTER | Age: 73
LOS: 2 days | DRG: 309 | End: 2023-09-06
Attending: STUDENT IN AN ORGANIZED HEALTH CARE EDUCATION/TRAINING PROGRAM | Admitting: STUDENT IN AN ORGANIZED HEALTH CARE EDUCATION/TRAINING PROGRAM
Payer: MEDICARE

## 2023-09-04 DIAGNOSIS — I48.21 PERMANENT ATRIAL FIBRILLATION (HCC): Chronic | ICD-10-CM

## 2023-09-04 DIAGNOSIS — Z94.0 KIDNEY TRANSPLANT RECIPIENT: Chronic | ICD-10-CM

## 2023-09-04 DIAGNOSIS — I48.0 PAROXYSMAL A-FIB (HCC): ICD-10-CM

## 2023-09-04 DIAGNOSIS — N18.9 ACUTE KIDNEY INJURY SUPERIMPOSED ON CHRONIC KIDNEY DISEASE (HCC): ICD-10-CM

## 2023-09-04 DIAGNOSIS — N18.6 ESRD (END STAGE RENAL DISEASE) (HCC): Chronic | ICD-10-CM

## 2023-09-04 DIAGNOSIS — I48.91 ATRIAL FIBRILLATION WITH RVR (HCC): ICD-10-CM

## 2023-09-04 DIAGNOSIS — N17.9 ACUTE KIDNEY INJURY SUPERIMPOSED ON CHRONIC KIDNEY DISEASE (HCC): ICD-10-CM

## 2023-09-04 DIAGNOSIS — R07.9 CHEST PAIN, UNSPECIFIED TYPE: ICD-10-CM

## 2023-09-04 LAB
ALBUMIN SERPL BCP-MCNC: 4.2 G/DL (ref 3.2–4.9)
ALBUMIN/GLOB SERPL: 1.4 G/DL
ALP SERPL-CCNC: 100 U/L (ref 30–99)
ALT SERPL-CCNC: 16 U/L (ref 2–50)
ANION GAP SERPL CALC-SCNC: 12 MMOL/L (ref 7–16)
AST SERPL-CCNC: 21 U/L (ref 12–45)
BASOPHILS # BLD AUTO: 0.2 % (ref 0–1.8)
BASOPHILS # BLD: 0.01 K/UL (ref 0–0.12)
BILIRUB SERPL-MCNC: 0.4 MG/DL (ref 0.1–1.5)
BUN SERPL-MCNC: 35 MG/DL (ref 8–22)
CALCIUM ALBUM COR SERPL-MCNC: 10.5 MG/DL (ref 8.5–10.5)
CALCIUM SERPL-MCNC: 10.7 MG/DL (ref 8.5–10.5)
CHLORIDE SERPL-SCNC: 105 MMOL/L (ref 96–112)
CO2 SERPL-SCNC: 19 MMOL/L (ref 20–33)
CREAT SERPL-MCNC: 1.51 MG/DL (ref 0.5–1.4)
EKG IMPRESSION: NORMAL
EOSINOPHIL # BLD AUTO: 0.01 K/UL (ref 0–0.51)
EOSINOPHIL NFR BLD: 0.2 % (ref 0–6.9)
ERYTHROCYTE [DISTWIDTH] IN BLOOD BY AUTOMATED COUNT: 47.9 FL (ref 35.9–50)
GFR SERPLBLD CREATININE-BSD FMLA CKD-EPI: 36 ML/MIN/1.73 M 2
GLOBULIN SER CALC-MCNC: 3 G/DL (ref 1.9–3.5)
GLUCOSE BLD STRIP.AUTO-MCNC: 197 MG/DL (ref 65–99)
GLUCOSE SERPL-MCNC: 196 MG/DL (ref 65–99)
HCT VFR BLD AUTO: 37.6 % (ref 37–47)
HGB BLD-MCNC: 11.6 G/DL (ref 12–16)
IMM GRANULOCYTES # BLD AUTO: 0.02 K/UL (ref 0–0.11)
IMM GRANULOCYTES NFR BLD AUTO: 0.4 % (ref 0–0.9)
LYMPHOCYTES # BLD AUTO: 0.51 K/UL (ref 1–4.8)
LYMPHOCYTES NFR BLD: 9.1 % (ref 22–41)
MAGNESIUM SERPL-MCNC: 1.9 MG/DL (ref 1.5–2.5)
MCH RBC QN AUTO: 27.2 PG (ref 27–33)
MCHC RBC AUTO-ENTMCNC: 30.9 G/DL (ref 32.2–35.5)
MCV RBC AUTO: 88.1 FL (ref 81.4–97.8)
MONOCYTES # BLD AUTO: 0.32 K/UL (ref 0–0.85)
MONOCYTES NFR BLD AUTO: 5.7 % (ref 0–13.4)
NEUTROPHILS # BLD AUTO: 4.74 K/UL (ref 1.82–7.42)
NEUTROPHILS NFR BLD: 84.4 % (ref 44–72)
NRBC # BLD AUTO: 0 K/UL
NRBC BLD-RTO: 0 /100 WBC (ref 0–0.2)
PLATELET # BLD AUTO: 126 K/UL (ref 164–446)
PLATELET BLD QL SMEAR: NORMAL
PMV BLD AUTO: 11.3 FL (ref 9–12.9)
POTASSIUM SERPL-SCNC: 5.5 MMOL/L (ref 3.6–5.5)
PROT SERPL-MCNC: 7.2 G/DL (ref 6–8.2)
RBC # BLD AUTO: 4.27 M/UL (ref 4.2–5.4)
SODIUM SERPL-SCNC: 136 MMOL/L (ref 135–145)
TROPONIN T SERPL-MCNC: 24 NG/L (ref 6–19)
TROPONIN T SERPL-MCNC: 27 NG/L (ref 6–19)
WBC # BLD AUTO: 5.6 K/UL (ref 4.8–10.8)

## 2023-09-04 PROCEDURE — 96374 THER/PROPH/DIAG INJ IV PUSH: CPT

## 2023-09-04 PROCEDURE — 700102 HCHG RX REV CODE 250 W/ 637 OVERRIDE(OP): Mod: UD | Performed by: STUDENT IN AN ORGANIZED HEALTH CARE EDUCATION/TRAINING PROGRAM

## 2023-09-04 PROCEDURE — 82962 GLUCOSE BLOOD TEST: CPT

## 2023-09-04 PROCEDURE — 84484 ASSAY OF TROPONIN QUANT: CPT | Mod: 91

## 2023-09-04 PROCEDURE — 700111 HCHG RX REV CODE 636 W/ 250 OVERRIDE (IP): Performed by: STUDENT IN AN ORGANIZED HEALTH CARE EDUCATION/TRAINING PROGRAM

## 2023-09-04 PROCEDURE — 700101 HCHG RX REV CODE 250: Mod: UD | Performed by: STUDENT IN AN ORGANIZED HEALTH CARE EDUCATION/TRAINING PROGRAM

## 2023-09-04 PROCEDURE — 36415 COLL VENOUS BLD VENIPUNCTURE: CPT

## 2023-09-04 PROCEDURE — 71045 X-RAY EXAM CHEST 1 VIEW: CPT

## 2023-09-04 PROCEDURE — 93005 ELECTROCARDIOGRAM TRACING: CPT | Performed by: STUDENT IN AN ORGANIZED HEALTH CARE EDUCATION/TRAINING PROGRAM

## 2023-09-04 PROCEDURE — 83735 ASSAY OF MAGNESIUM: CPT

## 2023-09-04 PROCEDURE — 99291 CRITICAL CARE FIRST HOUR: CPT | Performed by: STUDENT IN AN ORGANIZED HEALTH CARE EDUCATION/TRAINING PROGRAM

## 2023-09-04 PROCEDURE — 80053 COMPREHEN METABOLIC PANEL: CPT

## 2023-09-04 PROCEDURE — 99285 EMERGENCY DEPT VISIT HI MDM: CPT

## 2023-09-04 PROCEDURE — 85025 COMPLETE CBC W/AUTO DIFF WBC: CPT

## 2023-09-04 PROCEDURE — A9270 NON-COVERED ITEM OR SERVICE: HCPCS | Mod: UD | Performed by: STUDENT IN AN ORGANIZED HEALTH CARE EDUCATION/TRAINING PROGRAM

## 2023-09-04 PROCEDURE — A9270 NON-COVERED ITEM OR SERVICE: HCPCS | Performed by: STUDENT IN AN ORGANIZED HEALTH CARE EDUCATION/TRAINING PROGRAM

## 2023-09-04 PROCEDURE — 93005 ELECTROCARDIOGRAM TRACING: CPT

## 2023-09-04 PROCEDURE — 770020 HCHG ROOM/CARE - TELE (206)

## 2023-09-04 PROCEDURE — 700102 HCHG RX REV CODE 250 W/ 637 OVERRIDE(OP): Performed by: STUDENT IN AN ORGANIZED HEALTH CARE EDUCATION/TRAINING PROGRAM

## 2023-09-04 RX ORDER — DEXTROSE MONOHYDRATE 25 G/50ML
25 INJECTION, SOLUTION INTRAVENOUS
Status: DISCONTINUED | OUTPATIENT
Start: 2023-09-04 | End: 2023-09-06 | Stop reason: HOSPADM

## 2023-09-04 RX ORDER — ASPIRIN 325 MG
325 TABLET ORAL DAILY
Status: DISCONTINUED | OUTPATIENT
Start: 2023-09-05 | End: 2023-09-04

## 2023-09-04 RX ORDER — METOPROLOL TARTRATE 50 MG/1
50 TABLET, FILM COATED ORAL TWICE DAILY
Status: DISCONTINUED | OUTPATIENT
Start: 2023-09-04 | End: 2023-09-05

## 2023-09-04 RX ORDER — BISACODYL 10 MG
10 SUPPOSITORY, RECTAL RECTAL
Status: DISCONTINUED | OUTPATIENT
Start: 2023-09-04 | End: 2023-09-06 | Stop reason: HOSPADM

## 2023-09-04 RX ORDER — AMLODIPINE BESYLATE 5 MG/1
5 TABLET ORAL DAILY
Status: DISCONTINUED | OUTPATIENT
Start: 2023-09-05 | End: 2023-09-05

## 2023-09-04 RX ORDER — FUROSEMIDE 40 MG/1
40 TABLET ORAL DAILY
Status: DISCONTINUED | OUTPATIENT
Start: 2023-09-05 | End: 2023-09-04

## 2023-09-04 RX ORDER — AMOXICILLIN 250 MG
2 CAPSULE ORAL 2 TIMES DAILY
Status: DISCONTINUED | OUTPATIENT
Start: 2023-09-05 | End: 2023-09-06 | Stop reason: HOSPADM

## 2023-09-04 RX ORDER — TACROLIMUS 1 MG/1
2 CAPSULE ORAL EVERY EVENING
Status: DISCONTINUED | OUTPATIENT
Start: 2023-09-04 | End: 2023-09-06

## 2023-09-04 RX ORDER — LEVOTHYROXINE SODIUM 0.07 MG/1
75 TABLET ORAL
Status: DISCONTINUED | OUTPATIENT
Start: 2023-09-05 | End: 2023-09-06 | Stop reason: HOSPADM

## 2023-09-04 RX ORDER — TACROLIMUS 1 MG/1
1-2 CAPSULE ORAL 2 TIMES DAILY
Status: DISCONTINUED | OUTPATIENT
Start: 2023-09-04 | End: 2023-09-04

## 2023-09-04 RX ORDER — MYCOPHENOLATE MOFETIL 250 MG/1
250 CAPSULE ORAL 2 TIMES DAILY
Status: DISCONTINUED | OUTPATIENT
Start: 2023-09-04 | End: 2023-09-06 | Stop reason: HOSPADM

## 2023-09-04 RX ORDER — ASPIRIN 325 MG
325 TABLET ORAL ONCE
Status: COMPLETED | OUTPATIENT
Start: 2023-09-04 | End: 2023-09-04

## 2023-09-04 RX ORDER — POLYETHYLENE GLYCOL 3350 17 G/17G
1 POWDER, FOR SOLUTION ORAL
Status: DISCONTINUED | OUTPATIENT
Start: 2023-09-04 | End: 2023-09-06 | Stop reason: HOSPADM

## 2023-09-04 RX ORDER — METOPROLOL TARTRATE 1 MG/ML
5 INJECTION, SOLUTION INTRAVENOUS ONCE
Status: COMPLETED | OUTPATIENT
Start: 2023-09-04 | End: 2023-09-04

## 2023-09-04 RX ORDER — ONDANSETRON 2 MG/ML
4 INJECTION INTRAMUSCULAR; INTRAVENOUS EVERY 4 HOURS PRN
Status: DISCONTINUED | OUTPATIENT
Start: 2023-09-04 | End: 2023-09-06 | Stop reason: HOSPADM

## 2023-09-04 RX ORDER — METOPROLOL TARTRATE 1 MG/ML
5 INJECTION, SOLUTION INTRAVENOUS
Status: COMPLETED | OUTPATIENT
Start: 2023-09-04 | End: 2023-09-05

## 2023-09-04 RX ORDER — ONDANSETRON 4 MG/1
4 TABLET, ORALLY DISINTEGRATING ORAL EVERY 4 HOURS PRN
Status: DISCONTINUED | OUTPATIENT
Start: 2023-09-04 | End: 2023-09-06 | Stop reason: HOSPADM

## 2023-09-04 RX ORDER — ATORVASTATIN CALCIUM 20 MG/1
20 TABLET, FILM COATED ORAL NIGHTLY
Status: DISCONTINUED | OUTPATIENT
Start: 2023-09-04 | End: 2023-09-05

## 2023-09-04 RX ORDER — LOSARTAN POTASSIUM 100 MG/1
100 TABLET ORAL EVERY EVENING
Status: DISCONTINUED | OUTPATIENT
Start: 2023-09-04 | End: 2023-09-04

## 2023-09-04 RX ORDER — AMLODIPINE BESYLATE 5 MG/1
5 TABLET ORAL DAILY
COMMUNITY
End: 2023-09-08 | Stop reason: SDUPTHER

## 2023-09-04 RX ORDER — TACROLIMUS 1 MG/1
1 CAPSULE ORAL EVERY MORNING
Status: DISCONTINUED | OUTPATIENT
Start: 2023-09-05 | End: 2023-09-06

## 2023-09-04 RX ORDER — HYDROCODONE BITARTRATE AND ACETAMINOPHEN 10; 325 MG/1; MG/1
1 TABLET ORAL EVERY 6 HOURS PRN
Status: DISCONTINUED | OUTPATIENT
Start: 2023-09-04 | End: 2023-09-06 | Stop reason: HOSPADM

## 2023-09-04 RX ORDER — PREDNISONE 10 MG/1
10 TABLET ORAL DAILY
Status: DISCONTINUED | OUTPATIENT
Start: 2023-09-05 | End: 2023-09-05

## 2023-09-04 RX ADMIN — HYDROCODONE BITARTRATE AND ACETAMINOPHEN 1 TABLET: 10; 325 TABLET ORAL at 23:31

## 2023-09-04 RX ADMIN — TACROLIMUS 2 MG: 1 CAPSULE ORAL at 23:35

## 2023-09-04 RX ADMIN — ATORVASTATIN CALCIUM 20 MG: 20 TABLET, FILM COATED ORAL at 23:32

## 2023-09-04 RX ADMIN — METOPROLOL TARTRATE 5 MG: 5 INJECTION INTRAVENOUS at 18:08

## 2023-09-04 RX ADMIN — INSULIN HUMAN 1 UNITS: 100 INJECTION, SOLUTION PARENTERAL at 21:18

## 2023-09-04 RX ADMIN — ASPIRIN 325 MG: 325 TABLET ORAL at 18:16

## 2023-09-04 RX ADMIN — APIXABAN 2.5 MG: 2.5 TABLET, FILM COATED ORAL at 23:35

## 2023-09-04 RX ADMIN — METOPROLOL TARTRATE 50 MG: 50 TABLET, FILM COATED ORAL at 21:14

## 2023-09-04 ASSESSMENT — COGNITIVE AND FUNCTIONAL STATUS - GENERAL
SUGGESTED CMS G CODE MODIFIER DAILY ACTIVITY: CH
MOBILITY SCORE: 24
DAILY ACTIVITIY SCORE: 24
SUGGESTED CMS G CODE MODIFIER MOBILITY: CH

## 2023-09-04 ASSESSMENT — CHA2DS2 SCORE
CHF OR LEFT VENTRICULAR DYSFUNCTION: YES
VASCULAR DISEASE: NO
DIABETES: YES
AGE 75 OR GREATER: NO
SEX: FEMALE
AGE 65 TO 74: YES
HYPERTENSION: YES
PRIOR STROKE OR TIA OR THROMBOEMBOLISM: NO
CHA2DS2 VASC SCORE: 5

## 2023-09-04 ASSESSMENT — LIFESTYLE VARIABLES
EVER HAD A DRINK FIRST THING IN THE MORNING TO STEADY YOUR NERVES TO GET RID OF A HANGOVER: NO
ALCOHOL_USE: NO
DOES PATIENT WANT TO STOP DRINKING: NO
HAVE YOU EVER FELT YOU SHOULD CUT DOWN ON YOUR DRINKING: NO

## 2023-09-04 ASSESSMENT — FIBROSIS 4 INDEX
FIB4 SCORE: 3.04
FIB4 SCORE: 1.78

## 2023-09-04 ASSESSMENT — PAIN DESCRIPTION - PAIN TYPE: TYPE: ACUTE PAIN

## 2023-09-04 NOTE — ED TRIAGE NOTES
Pt ambulated to triage with   Chief Complaint   Patient presents with    Chest Pain     Left chest pain into left arm, started today at 1300, coming and going.  Pt also tachycardic and feels like heart is racing.  H/o same and has been seen a an ER in the past without dx;  pt reports that they have done a holter monitor without dx.  Pt reports that she has been seen by cardiologist.     Pt Arabic speaking only,  utilized.  Protocol ordered.  Pt Informed regarding triage process and verbalized understanding to inform triage tech or RN for any changes in condition. Placed in lobby.

## 2023-09-05 ENCOUNTER — APPOINTMENT (OUTPATIENT)
Dept: LAB | Facility: MEDICAL CENTER | Age: 73
End: 2023-09-05
Attending: INTERNAL MEDICINE
Payer: MEDICARE

## 2023-09-05 PROBLEM — E87.8 LOW BICARBONATE LEVEL: Status: ACTIVE | Noted: 2023-09-05

## 2023-09-05 LAB
ANION GAP SERPL CALC-SCNC: 16 MMOL/L (ref 7–16)
B-OH-BUTYR SERPL-MCNC: 0.07 MMOL/L (ref 0.02–0.27)
BASE EXCESS BLDA CALC-SCNC: -4 MMOL/L (ref -4–3)
BODY TEMPERATURE: 36.4 CENTIGRADE
BUN SERPL-MCNC: 36 MG/DL (ref 8–22)
CALCIUM SERPL-MCNC: 10.1 MG/DL (ref 8.5–10.5)
CHLORIDE SERPL-SCNC: 106 MMOL/L (ref 96–112)
CO2 SERPL-SCNC: 13 MMOL/L (ref 20–33)
CREAT SERPL-MCNC: 1.34 MG/DL (ref 0.5–1.4)
EKG IMPRESSION: NORMAL
GFR SERPLBLD CREATININE-BSD FMLA CKD-EPI: 42 ML/MIN/1.73 M 2
GLUCOSE BLD STRIP.AUTO-MCNC: 135 MG/DL (ref 65–99)
GLUCOSE BLD STRIP.AUTO-MCNC: 155 MG/DL (ref 65–99)
GLUCOSE BLD STRIP.AUTO-MCNC: 190 MG/DL (ref 65–99)
GLUCOSE BLD STRIP.AUTO-MCNC: 213 MG/DL (ref 65–99)
GLUCOSE BLD STRIP.AUTO-MCNC: 225 MG/DL (ref 65–99)
GLUCOSE SERPL-MCNC: 141 MG/DL (ref 65–99)
HCO3 BLDA-SCNC: 20 MMOL/L (ref 17–25)
INHALED O2 FLOW RATE: NORMAL L/MIN
NT-PROBNP SERPL IA-MCNC: 5520 PG/ML (ref 0–125)
PCO2 BLDA: 31.2 MMHG (ref 26–37)
PCO2 TEMP ADJ BLDA: 30.4 MMHG (ref 26–37)
PH BLDA: 7.42 [PH] (ref 7.4–7.5)
PH TEMP ADJ BLDA: 7.42 [PH] (ref 7.4–7.5)
PO2 BLDA: 75.8 MMHG (ref 64–87)
PO2 TEMP ADJ BLDA: 72.8 MMHG (ref 64–87)
POTASSIUM SERPL-SCNC: 5.2 MMOL/L (ref 3.6–5.5)
SAO2 % BLDA: 94.8 % (ref 93–99)
SODIUM SERPL-SCNC: 135 MMOL/L (ref 135–145)
TROPONIN T SERPL-MCNC: 31 NG/L (ref 6–19)
TSH SERPL DL<=0.005 MIU/L-ACNC: 2.78 UIU/ML (ref 0.38–5.33)

## 2023-09-05 PROCEDURE — A9270 NON-COVERED ITEM OR SERVICE: HCPCS | Performed by: NURSE PRACTITIONER

## 2023-09-05 PROCEDURE — 99291 CRITICAL CARE FIRST HOUR: CPT | Performed by: STUDENT IN AN ORGANIZED HEALTH CARE EDUCATION/TRAINING PROGRAM

## 2023-09-05 PROCEDURE — 93005 ELECTROCARDIOGRAM TRACING: CPT | Performed by: STUDENT IN AN ORGANIZED HEALTH CARE EDUCATION/TRAINING PROGRAM

## 2023-09-05 PROCEDURE — 82803 BLOOD GASES ANY COMBINATION: CPT

## 2023-09-05 PROCEDURE — 700105 HCHG RX REV CODE 258: Performed by: STUDENT IN AN ORGANIZED HEALTH CARE EDUCATION/TRAINING PROGRAM

## 2023-09-05 PROCEDURE — 770020 HCHG ROOM/CARE - TELE (206)

## 2023-09-05 PROCEDURE — 700102 HCHG RX REV CODE 250 W/ 637 OVERRIDE(OP): Performed by: INTERNAL MEDICINE

## 2023-09-05 PROCEDURE — 700105 HCHG RX REV CODE 258: Performed by: INTERNAL MEDICINE

## 2023-09-05 PROCEDURE — 700102 HCHG RX REV CODE 250 W/ 637 OVERRIDE(OP): Performed by: NURSE PRACTITIONER

## 2023-09-05 PROCEDURE — 80048 BASIC METABOLIC PNL TOTAL CA: CPT

## 2023-09-05 PROCEDURE — 700111 HCHG RX REV CODE 636 W/ 250 OVERRIDE (IP): Performed by: INTERNAL MEDICINE

## 2023-09-05 PROCEDURE — 700111 HCHG RX REV CODE 636 W/ 250 OVERRIDE (IP): Performed by: NURSE PRACTITIONER

## 2023-09-05 PROCEDURE — 700111 HCHG RX REV CODE 636 W/ 250 OVERRIDE (IP): Performed by: STUDENT IN AN ORGANIZED HEALTH CARE EDUCATION/TRAINING PROGRAM

## 2023-09-05 PROCEDURE — 700105 HCHG RX REV CODE 258: Performed by: NURSE PRACTITIONER

## 2023-09-05 PROCEDURE — 99222 1ST HOSP IP/OBS MODERATE 55: CPT | Performed by: INTERNAL MEDICINE

## 2023-09-05 PROCEDURE — 84484 ASSAY OF TROPONIN QUANT: CPT

## 2023-09-05 PROCEDURE — 82010 KETONE BODYS QUAN: CPT

## 2023-09-05 PROCEDURE — 93010 ELECTROCARDIOGRAM REPORT: CPT | Performed by: INTERNAL MEDICINE

## 2023-09-05 PROCEDURE — 700111 HCHG RX REV CODE 636 W/ 250 OVERRIDE (IP)

## 2023-09-05 PROCEDURE — A9270 NON-COVERED ITEM OR SERVICE: HCPCS | Performed by: INTERNAL MEDICINE

## 2023-09-05 PROCEDURE — A9270 NON-COVERED ITEM OR SERVICE: HCPCS | Performed by: STUDENT IN AN ORGANIZED HEALTH CARE EDUCATION/TRAINING PROGRAM

## 2023-09-05 PROCEDURE — 99222 1ST HOSP IP/OBS MODERATE 55: CPT | Mod: AI | Performed by: INTERNAL MEDICINE

## 2023-09-05 PROCEDURE — 700102 HCHG RX REV CODE 250 W/ 637 OVERRIDE(OP): Performed by: STUDENT IN AN ORGANIZED HEALTH CARE EDUCATION/TRAINING PROGRAM

## 2023-09-05 PROCEDURE — 700111 HCHG RX REV CODE 636 W/ 250 OVERRIDE (IP): Mod: JZ | Performed by: INTERNAL MEDICINE

## 2023-09-05 PROCEDURE — 82962 GLUCOSE BLOOD TEST: CPT

## 2023-09-05 PROCEDURE — 700101 HCHG RX REV CODE 250: Performed by: STUDENT IN AN ORGANIZED HEALTH CARE EDUCATION/TRAINING PROGRAM

## 2023-09-05 PROCEDURE — 36415 COLL VENOUS BLD VENIPUNCTURE: CPT

## 2023-09-05 PROCEDURE — 83880 ASSAY OF NATRIURETIC PEPTIDE: CPT

## 2023-09-05 PROCEDURE — 84443 ASSAY THYROID STIM HORMONE: CPT

## 2023-09-05 RX ORDER — SODIUM CHLORIDE, SODIUM LACTATE, POTASSIUM CHLORIDE, CALCIUM CHLORIDE 600; 310; 30; 20 MG/100ML; MG/100ML; MG/100ML; MG/100ML
INJECTION, SOLUTION INTRAVENOUS CONTINUOUS
Status: DISCONTINUED | OUTPATIENT
Start: 2023-09-05 | End: 2023-09-05

## 2023-09-05 RX ORDER — DEXTROSE MONOHYDRATE 50 MG/ML
INJECTION, SOLUTION INTRAVENOUS CONTINUOUS
Status: DISCONTINUED | OUTPATIENT
Start: 2023-09-05 | End: 2023-09-06 | Stop reason: HOSPADM

## 2023-09-05 RX ORDER — SODIUM BICARBONATE IN D5W 150/1000ML
PLASTIC BAG, INJECTION (ML) INTRAVENOUS CONTINUOUS
Status: DISPENSED | OUTPATIENT
Start: 2023-09-05 | End: 2023-09-06

## 2023-09-05 RX ORDER — AMIODARONE HYDROCHLORIDE 200 MG/1
200 TABLET ORAL TWICE DAILY
Status: DISCONTINUED | OUTPATIENT
Start: 2023-09-05 | End: 2023-09-06 | Stop reason: HOSPADM

## 2023-09-05 RX ORDER — PRAVASTATIN SODIUM 20 MG
40 TABLET ORAL EVERY EVENING
Status: DISCONTINUED | OUTPATIENT
Start: 2023-09-05 | End: 2023-09-06 | Stop reason: HOSPADM

## 2023-09-05 RX ORDER — DIGOXIN 0.25 MG/ML
250 INJECTION INTRAMUSCULAR; INTRAVENOUS ONCE
Status: COMPLETED | OUTPATIENT
Start: 2023-09-05 | End: 2023-09-05

## 2023-09-05 RX ORDER — MAGNESIUM SULFATE 1 G/100ML
1 INJECTION INTRAVENOUS ONCE
Status: COMPLETED | OUTPATIENT
Start: 2023-09-05 | End: 2023-09-05

## 2023-09-05 RX ORDER — PREDNISONE 5 MG/1
5 TABLET ORAL DAILY
Status: DISCONTINUED | OUTPATIENT
Start: 2023-09-06 | End: 2023-09-06 | Stop reason: HOSPADM

## 2023-09-05 RX ORDER — SODIUM BICARBONATE 650 MG/1
1300 TABLET ORAL 2 TIMES DAILY
Status: DISCONTINUED | OUTPATIENT
Start: 2023-09-05 | End: 2023-09-06 | Stop reason: HOSPADM

## 2023-09-05 RX ORDER — FUROSEMIDE 10 MG/ML
40 INJECTION INTRAMUSCULAR; INTRAVENOUS
Status: DISCONTINUED | OUTPATIENT
Start: 2023-09-05 | End: 2023-09-06 | Stop reason: HOSPADM

## 2023-09-05 RX ORDER — METOPROLOL TARTRATE 50 MG/1
50 TABLET, FILM COATED ORAL EVERY 6 HOURS
Status: DISCONTINUED | OUTPATIENT
Start: 2023-09-05 | End: 2023-09-05

## 2023-09-05 RX ADMIN — MYCOPHENOLATE MOFETIL 250 MG: 250 CAPSULE ORAL at 00:17

## 2023-09-05 RX ADMIN — FUROSEMIDE 40 MG: 10 INJECTION INTRAMUSCULAR; INTRAVENOUS at 10:50

## 2023-09-05 RX ADMIN — INSULIN GLARGINE-YFGN 4 UNITS: 100 INJECTION, SOLUTION SUBCUTANEOUS at 05:33

## 2023-09-05 RX ADMIN — PRAVASTATIN SODIUM 40 MG: 20 TABLET ORAL at 18:24

## 2023-09-05 RX ADMIN — SODIUM BICARBONATE 75 MEQ: 84 INJECTION, SOLUTION INTRAVENOUS at 10:50

## 2023-09-05 RX ADMIN — METOPROLOL TARTRATE 5 MG: 5 INJECTION INTRAVENOUS at 03:44

## 2023-09-05 RX ADMIN — TACROLIMUS 1 MG: 1 CAPSULE ORAL at 05:31

## 2023-09-05 RX ADMIN — AMIODARONE HYDROCHLORIDE 1 MG/MIN: 1.8 INJECTION, SOLUTION INTRAVENOUS at 12:19

## 2023-09-05 RX ADMIN — METOPROLOL TARTRATE 5 MG: 5 INJECTION INTRAVENOUS at 03:33

## 2023-09-05 RX ADMIN — SODIUM BICARBONATE: 84 INJECTION, SOLUTION INTRAVENOUS at 17:10

## 2023-09-05 RX ADMIN — HYDROCODONE BITARTRATE AND ACETAMINOPHEN 1 TABLET: 10; 325 TABLET ORAL at 21:09

## 2023-09-05 RX ADMIN — APIXABAN 2.5 MG: 2.5 TABLET, FILM COATED ORAL at 22:55

## 2023-09-05 RX ADMIN — MAGNESIUM SULFATE IN DEXTROSE 1 G: 10 INJECTION, SOLUTION INTRAVENOUS at 01:06

## 2023-09-05 RX ADMIN — INSULIN HUMAN 2 UNITS: 100 INJECTION, SOLUTION PARENTERAL at 12:29

## 2023-09-05 RX ADMIN — APIXABAN 2.5 MG: 2.5 TABLET, FILM COATED ORAL at 09:37

## 2023-09-05 RX ADMIN — SENNOSIDES AND DOCUSATE SODIUM 2 TABLET: 50; 8.6 TABLET ORAL at 05:28

## 2023-09-05 RX ADMIN — METOPROLOL TARTRATE 50 MG: 50 TABLET, FILM COATED ORAL at 06:13

## 2023-09-05 RX ADMIN — SODIUM BICARBONATE 75 MEQ: 84 INJECTION, SOLUTION INTRAVENOUS at 12:32

## 2023-09-05 RX ADMIN — LEVOTHYROXINE SODIUM 75 MCG: 0.07 TABLET ORAL at 05:30

## 2023-09-05 RX ADMIN — MYCOPHENOLATE MOFETIL 250 MG: 250 CAPSULE ORAL at 22:55

## 2023-09-05 RX ADMIN — MYCOPHENOLATE MOFETIL 250 MG: 250 CAPSULE ORAL at 12:04

## 2023-09-05 RX ADMIN — METOPROLOL TARTRATE 5 MG: 5 INJECTION INTRAVENOUS at 01:15

## 2023-09-05 RX ADMIN — PREDNISONE 10 MG: 10 TABLET ORAL at 05:28

## 2023-09-05 RX ADMIN — METOPROLOL TARTRATE 50 MG: 50 TABLET, FILM COATED ORAL at 12:05

## 2023-09-05 RX ADMIN — AMIODARONE HYDROCHLORIDE 200 MG: 200 TABLET ORAL at 15:32

## 2023-09-05 RX ADMIN — TACROLIMUS 2 MG: 1 CAPSULE ORAL at 18:24

## 2023-09-05 RX ADMIN — SODIUM CHLORIDE, POTASSIUM CHLORIDE, SODIUM LACTATE AND CALCIUM CHLORIDE: 600; 310; 30; 20 INJECTION, SOLUTION INTRAVENOUS at 01:24

## 2023-09-05 RX ADMIN — SODIUM BICARBONATE 1300 MG: 650 TABLET ORAL at 18:24

## 2023-09-05 RX ADMIN — INSULIN HUMAN 1 UNITS: 100 INJECTION, SOLUTION PARENTERAL at 09:37

## 2023-09-05 RX ADMIN — SENNOSIDES AND DOCUSATE SODIUM 2 TABLET: 50; 8.6 TABLET ORAL at 18:24

## 2023-09-05 RX ADMIN — DEXTROSE MONOHYDRATE: 50 INJECTION, SOLUTION INTRAVENOUS at 12:00

## 2023-09-05 RX ADMIN — AMIODARONE HYDROCHLORIDE 150 MG: 1.5 INJECTION, SOLUTION INTRAVENOUS at 11:56

## 2023-09-05 RX ADMIN — DIGOXIN 250 MCG: 0.25 INJECTION INTRAMUSCULAR; INTRAVENOUS at 04:49

## 2023-09-05 RX ADMIN — INSULIN HUMAN 1 UNITS: 100 INJECTION, SOLUTION PARENTERAL at 18:34

## 2023-09-05 RX ADMIN — INSULIN HUMAN 2 UNITS: 100 INJECTION, SOLUTION PARENTERAL at 21:01

## 2023-09-05 ASSESSMENT — ENCOUNTER SYMPTOMS
BRUISES/BLEEDS EASILY: 0
CHILLS: 0
HEMOPTYSIS: 0
PALPITATIONS: 1
ABDOMINAL PAIN: 0
CHEST TIGHTNESS: 1
DEPRESSION: 0
FEVER: 0
MYALGIAS: 0
HEADACHES: 0
DIZZINESS: 0
APNEA: 0
BLURRED VISION: 0
DOUBLE VISION: 0
COUGH: 0
NAUSEA: 0
SHORTNESS OF BREATH: 0
FATIGUE: 1
NECK PAIN: 0
PALPITATIONS: 0
HEARTBURN: 0
SHORTNESS OF BREATH: 1

## 2023-09-05 ASSESSMENT — PAIN DESCRIPTION - PAIN TYPE
TYPE: ACUTE PAIN
TYPE: ACUTE PAIN

## 2023-09-05 ASSESSMENT — FIBROSIS 4 INDEX: FIB4 SCORE: 3.04

## 2023-09-05 NOTE — CARE PLAN
Problem: Knowledge Deficit - Standard  Goal: Patient and family/care givers will demonstrate understanding of plan of care, disease process/condition, diagnostic tests and medications  Outcome: Progressing  Pt educated on POC, all questions answered in regards to disease process, treatment and diet. Pt verbalized understanding and voiced no further questions at this time.     The patient is Stable - Low risk of patient condition declining or worsening    Shift Goals  Clinical Goals: Monitor labs, monitor HR and rhythm  Patient Goals: Rest  Family Goals: TEE

## 2023-09-05 NOTE — PROGRESS NOTES
NOC HOSPITALIST CROSS COVER    Notified by RN regarding patient having persistent Afib with RVR, with rates sustained in the 120s-130s despite 2 doses of IV lopressor. Recommended giving the third dose, which did not improve patient's heart rate. Review of prior records indicate that the patient has been admitted for similar symptoms in the past, with chest pain secondary to atrial fibrillation. Per notes from Dr Richardson (2/18/23), the patient is very symptomatic to atrial fibrillation when the rate is not controlled. She required a cardioversion for this on 2/19/23. The patient is reporting some chest pressure with left arm radiation, similar to those prior admissions. Her EKG showed Afib with RVR, rate 123, without significant ST elevations or depressions. Her troponins were relatively flat at 24-->27-->31, which is around her baseline from prior.       Vitals:    09/05/23 0612   BP: 130/78   Pulse: (!) 123   Resp: 16   Temp:    SpO2: 94%      Plan:  #Atrial fibrillation with RVR  -Not responsive to beta-blockade with IV lopressor  -Trial Digoxin 250 mcg IV once  -Given elevated NT-proBNP, will hold off on Cardizem   -Consider cardiology consult     -----------------------------------------------------------------------------------------------------------    Electronically signed by:  Blanca Cat, MEL, SAMMI, SAMANTA-BC  Hospitalist Services

## 2023-09-05 NOTE — PROGRESS NOTES
Hospital Medicine Daily Progress Note    Date of Service  9/5/2023    Chief Complaint  Tere Gallegos is a 72 y.o. female admitted 9/4/2023 with chest pain    Hospital Course  72 female with past medical history of A-fib on Eliquis, kidney transplant on immunosuppressive, hypertension presented with chest pain and palpitation.  Noted to be in A-fib with RVR in the ED.  Admitted for further management and treatment for A-fib with RVR      Interval Problem Update  9/5/2023  Seen and examined in morning. rounds.  Noted to be in AF with RVR    Labs reviewed noted with low bicarbonate 13, troponin trended flat elevated BNP.    Case discussed with cardiology Dr. Bryson.  Cardiology started patient on amiodarone.  Plan to possible cardiovert tomorrow.    Case discussed with nephrology Dr. Villar .  Currently on bicarb drip for low bicarbonate    After starting amiodarone patient remained bradycardic.  EKG with sinus bradycardia.  She remained asymptomatic.  Beta-blocker held.  Likely amiodarone need to be held if remained bradycardic    Continue on amiodarone drip  Continue on bicarb drip  Continue on IV Lasix, Eliquis Lipitor 20  Need close monitoring telemetry    Repeat BMP in a.m. to monitor electrolytes and toxicity while on  high-dose diuretics and IV amiodarone  Repeat CBC in a.m. to monitor white count and hemoglobin     Need monitoring of blood counts, electrolytes renal function due to potential of organ dysfunction while on IV lasix, IV amiodarone  High risk of deterioration /Arrhythmias need to be monitor under telemetry .       I have discussed this patient's plan of care and discharge plan at IDT rounds today with Case Management, Nursing, Nursing leadership, and other members of the IDT team.    Consultants/Specialty  cardiology    Code Status  Full Code    Disposition  The patient is not medically cleared for discharge to home or a post-acute facility.  Anticipate discharge to: home with close  outpatient follow-up    I have placed the appropriate orders for post-discharge needs.    Review of Systems  Review of Systems   Respiratory:  Positive for shortness of breath.    Cardiovascular:  Positive for palpitations.        Physical Exam  Temp:  [36 °C (96.8 °F)-36.9 °C (98.5 °F)] 36.4 °C (97.5 °F)  Pulse:  [118-140] 118  Resp:  [14-24] 16  BP: (107-151)/(59-85) 107/59  SpO2:  [94 %-98 %] 95 %    Physical Exam  Constitutional:       Appearance: Normal appearance.   Cardiovascular:      Rate and Rhythm: Regular rhythm. Tachycardia present.      Pulses: Normal pulses.      Heart sounds: Normal heart sounds.   Pulmonary:      Effort: Pulmonary effort is normal.   Abdominal:      General: Abdomen is flat.   Musculoskeletal:      Right lower leg: No edema.      Left lower leg: No edema.   Neurological:      General: No focal deficit present.      Mental Status: She is alert and oriented to person, place, and time.   Psychiatric:         Mood and Affect: Mood normal.         Fluids    Intake/Output Summary (Last 24 hours) at 9/5/2023 1416  Last data filed at 9/5/2023 1113  Gross per 24 hour   Intake 360 ml   Output 400 ml   Net -40 ml       Laboratory  Recent Labs     09/04/23  1636   WBC 5.6   RBC 4.27   HEMOGLOBIN 11.6*   HEMATOCRIT 37.6   MCV 88.1   MCH 27.2   MCHC 30.9*   RDW 47.9   PLATELETCT 126*   MPV 11.3     Recent Labs     09/04/23  1636 09/05/23  0133   SODIUM 136 135   POTASSIUM 5.5 5.2   CHLORIDE 105 106   CO2 19* 13*   GLUCOSE 196* 141*   BUN 35* 36*   CREATININE 1.51* 1.34   CALCIUM 10.7* 10.1                   Imaging  DX-CHEST-PORTABLE (1 VIEW)   Final Result      Enlarged cardiac silhouette without evidence of acute disease.      EC-ECHOCARDIOGRAM COMPLETE W/O CONT    (Results Pending)        Assessment/Plan  * Atrial fibrillation with RVR (HCC)- (present on admission)  Assessment & Plan  9/5/2023    Telemetry monitoring  Started on amiodarone drip  Cardiology been consulted  Plan for  cardioversion if remain A-fib with RVR    Low bicarbonate level  Assessment & Plan  On bicarb drip   Repeat labs in a.m.      Acute kidney injury superimposed on chronic kidney disease (HCC)- (present on admission)  Assessment & Plan  BUN/Cr 23  Gentle IV Fluids total of 500 cc  Avoid nephrotoxic agents  Renally adjust all medications   Bladder scan r/o retention       Permanent atrial fibrillation (HCC)- (present on admission)  Assessment & Plan  Telemetry monitoring  On amiodarone  Cardiology following    Kidney transplant recipient- (present on admission)  Assessment & Plan  Resume immunosuppressives   Nephrology following    Acquired hypothyroidism- (present on admission)  Assessment & Plan  Synthroid     Chronic heart failure with preserved ejection fraction (HCC)- (present on admission)  Assessment & Plan   on IV Lasix  Get echocardiogram    Mixed hyperlipidemia- (present on admission)  Assessment & Plan  atorvastatin     Elevated troponin  Assessment & Plan  Suspect leak secondary to demand A fib and SASHA. Doubt ACS  Serial troponin trending flat   Telemetry monitoring   Echo 2D     Diabetes (HCC)- (present on admission)  Assessment & Plan  LA/SSI + Accu checks + hypoglycemia protocol     Renovascular hypertension- (present on admission)  Assessment & Plan  Resume amlodpine, metoprolol  Hold losartan given SASHA            VTE prophylaxis:   On eliquis  I have performed a physical exam and reviewed and updated ROS and Plan today (9/5/2023). In review of yesterday's note (9/4/2023), there are no changes except as documented above.    Patient is critically ill.   The patient continues to be in A-fib RVR  The vital organ system that is affected is the: Cardiac, renal  If untreated there is a high chance of deterioration into: Cardiac arrest  And eventually death.   Case discussed with cardiology Dr. Bourne.  Nephrology Dr. Villar  The critical care that I am providing today is: Started on amiodarone drip, monitoring  for arrhythmia on the telemetry, on bicarb drip for acidemia  The critical that has been undertaken is medically complex.   There has been no overlap in critical care time.   Critical Care Time not including procedures: 33

## 2023-09-05 NOTE — DISCHARGE PLANNING
Care Transition Team Assessment    LMSW spoke with pt at bedside to verify pt demographics.  LMSW role was explained to pt. Pt lives with Son and upon DC pt stated that her son will be able to transport her home. Pt stated she does not use any DME. Pt stated that she recently had a surgery in [place] and currently has a nurse coming to her house to check on her progress. Pt is unaware of which agency the nurse is contracted with but they were referred by the hospital in [place]. Pt had concern that she usually tests for her blood sugar and wants to see if there is any assistance from CM to help pay for the testing strips. CM informed her we will follow up with what we can do.     Information Source  Orientation Level: Oriented X4  Information Given By: Patient  Who is responsible for making decisions for patient? : Patient    Readmission Evaluation  Is this a readmission?: No    Elopement Risk  Legal Hold: No  Ambulatory or Self Mobile in Wheelchair: Yes  Disoriented: No  Elopement Risk: Not at Risk for Elopement    Interdisciplinary Discharge Planning  Does Admitting Nurse Feel This Could be a Complex Discharge?: No  Primary Care Physician: ISIDRO REYNA  Lives with - Patient's Self Care Capacity: Spouse, Adult Children  Patient or legal guardian wants to designate a caregiver: No  Support Systems: Family Member(s), Spouse / Significant Other  Housing / Facility: 2 Story House  Mobility Issues: No  Prior Services: Home With Outpatient Therapy (Patient has a nurse. Not aware of name of agency.)  Durable Medical Equipment: Not Applicable    Discharge Preparedness  What is your plan after discharge?: Home with help  Prior Functional Level: Ambulatory, Independent with Activities of Daily Living  Difficulity with ADLs: None  Difficulity with IADLs: None    Functional Assesment  Prior Functional Level: Ambulatory, Independent with Activities of Daily Living    Domestic Abuse  Have you ever been the victim  of abuse or violence?: No  Physical Abuse or Sexual Abuse: No  Verbal Abuse or Emotional Abuse: No  Possible Abuse/Neglect Reported to:: Not Applicable    Anticipated Discharge Information  Discharge Disposition: D/T to home under HHA care in anticipation of covered skilled care (06)  Discharge Address: [facility] [address] [place] [address]  Discharge Contact Phone Number: [phone]

## 2023-09-05 NOTE — ASSESSMENT & PLAN NOTE
BUN/Cr 23  Gentle IV Fluids total of 500 cc  Avoid nephrotoxic agents  Renally adjust all medications   Bladder scan r/o retention

## 2023-09-05 NOTE — PROGRESS NOTES
Heart rate in the 120s-130s, metoprolol 5mg IVP (see MAR) luis manuel, DANITZA Cat updated and patient remains to c/o chest pressure with left arm numbness, will continue to monitor.

## 2023-09-05 NOTE — PROGRESS NOTES
4 Eyes Skin Assessment Completed by TRAVIS DUBOSE and TRAVIS DICKSON.    Head WDL  Ears WDL  Nose WDL  Mouth WDL  Neck WDL  Breast/Chest WDL  Shoulder Blades WDL  Spine WDL  (R) Arm/Elbow/Hand WDL  (L) Arm/Elbow/Hand WDL  Abdomen WDL  Groin WDL  Scrotum/Coccyx/Buttocks WDL  (R) Leg WDL  (L) Leg WDL  (R) Heel/Foot/Toe WDL  (L) Heel/Foot/Toe WDL          Devices In Places Tele Box, Blood Pressure Cuff, and Pulse Ox      Interventions In Place N/A    Possible Skin Injury No    Pictures Uploaded Into Epic N/A  Wound Consult Placed N/A  RN Wound Prevention Protocol Ordered No

## 2023-09-05 NOTE — CONSULTS
Nephrology Consultation    Date of Service  2023    Referring Physician  Kuldip Hinkle M.D.    Consulting Physician  Priyank Villar M.D.    Reason for Consultation  Management of ESRD s/p kidney transplant    History of Presenting Illness  72 y.o. female with history of diabetes, hypertension, ESRD, previously on hemodialysis, status post  donor kidney transplant 10/31/2022 at [facility], complicated by ureteral stenosis requiring ureteral reimplantation 2023 who presented 2023 with chest pain.     services were used in the patient's primary language of Portuguese.   Name or Number: Brittaney [id number]  Mode of interpretation: iPad      The patient says she started experiencing chest pain and palpitations on the day of admission.  She has a history of atrial fibrillation, but the pain was worse.  She described pain radiating up to her neck.  She came into the hospital for further evaluation.  She is not familiar with her dose of antirejection medicines.  She says her family handles her medications for her.  On review of records from [facility], patient is supposed to be on tacrolimus 1 mg in the morning and 2 mg in the evening, mycophenolate 250 mg p.o. twice daily, and prednisone 5 mg daily.  Patient thinks she is taking sodium bicarbonate tablets at home still.  At my last clinic visit, she was told to take sodium bicarbonate 1300 mg p.o. 3 times daily, but at recent telemedicine visit on 2023 with transplant, she was told to take 650 mg p.o. twice daily.    Today, her chest pain is resolved.    Review of Systems  Review of Systems   Constitutional:  Negative for fever.   Respiratory:  Negative for shortness of breath.    Cardiovascular:  Negative for chest pain.   Gastrointestinal:  Negative for abdominal pain.   All other systems reviewed and are negative.      Past Medical History  Past Medical History:   Diagnosis Date    Acquired hypothyroidism 2020    CAD  (coronary artery disease)     Chronic diastolic heart failure ([facility]) 2020    Coronary artery disease due to lipid rich plaque     2 Synergy NOEMI to 100% RCA stent placed    Dental disorder     partial dentures- uppers    Diabetes ([facility])     oral medication    ESRD (end stage renal disease) on dialysis ([facility]) 2020    Hemodialysis patient ([facility])     M, W, F    Hyperlipidemia     Hypertension     Kidney transplant candidate     Kidney transplant recipient 10/31/2022    Presence of drug-eluting stent in right coronary artery     QT prolongation 2020    RLS (restless legs syndrome) 2016    Transaminitis 2018       Surgical History  Past Surgical History:   Procedure Laterality Date    URETERAL REIMPLANTATION  2023    [place] [facility] [place]    OTHER ABDOMINAL SURGERY Right 10/31/2022     Donor kidney transplant - [facility]    Z CARDIAC CATH  2016    RCA stented with 2 Synergy drug-eluting stents.    RECOVERY  2016    Procedure: CATH LAB [facility] WITH POSSIBLE DR. CASTILLO;  Surgeon: [facility];  Location: SURGERY PRE-POST PROC UNIT [facility];  Service:     ZZZ CARDIAC CATH  2016    100% RCA    OTHER Left 2014    left arm upper extremity fistula    OTHER ABDOMINAL SURGERY      left kidney removed due to cancer       Family History  Family History   Problem Relation Age of Onset    Diabetes Sister     Other Sister         liver disease    Diabetes Brother     Heart Disease Neg Hx        Social History  Social History     Socioeconomic History    Marital status:      Spouse name: Not on file    Number of children: Not on file    Years of education: Not on file    Highest education level: Not on file   Occupational History    Not on file   Tobacco Use    Smoking status: Never    Smokeless tobacco: Never   Vaping Use    Vaping Use: Never used   Substance and Sexual Activity    Alcohol use: No     Alcohol/week: 0.0 oz    Drug  use: No    Sexual activity: Never   Other Topics Concern    Not on file   Social History Narrative    Not on file     Social Determinants of Health     Financial Resource Strain: Not on file   Food Insecurity: Not on file   Transportation Needs: Not on file   Physical Activity: Not on file   Stress: Not on file   Social Connections: Not on file   Intimate Partner Violence: Not on file   Housing Stability: Not on file       Medications  Current Facility-Administered Medications   Medication Dose Route Frequency Provider Last Rate Last Admin    furosemide (Lasix) injection 40 mg  40 mg Intravenous Q DAY Sorin Bryson D.O.   40 mg at 09/05/23 1050    dextrose 5% infusion   Intravenous Continuous MARKELL Hyde.P.R.N. 30 mL/hr at 09/05/23 1200 New Bag at 09/05/23 1200    sodium bicarbonate 75 mEq in 1/2 NS 1,000 mL infusion  75 mEq Intravenous Continuous Priyank Villar M.D. 83 mL/hr at 09/05/23 1232 75 mEq at 09/05/23 1232    amiodarone (Cordarone) tablet 200 mg  200 mg Oral TWICE DAILY Vicki Morales A.P.R.N.   200 mg at 09/05/23 1532    metoprolol tartrate (Lopressor) tablet 25 mg  25 mg Oral TWICE DAILY MARKELL Hyde.P.R.N.        senna-docusate (Pericolace Or Senokot S) 8.6-50 MG per tablet 2 Tablet  2 Tablet Oral BID Favian Cruz M.D.   2 Tablet at 09/05/23 0528    And    polyethylene glycol/lytes (Miralax) PACKET 1 Packet  1 Packet Oral QDAY SHAMIR Cruz M.D.        And    magnesium hydroxide (Milk Of Magnesia) suspension 30 mL  30 mL Oral QDAY SHAMIR Cruz M.D.        And    bisacodyl (Dulcolax) suppository 10 mg  10 mg Rectal QDAY SHAMIR Cruz M.D.        ondansetron (Zofran) syringe/vial injection 4 mg  4 mg Intravenous Q4HRS SHAMIR Cruz M.D.        ondansetron (Zofran ODT) dispertab 4 mg  4 mg Oral Q4HRS PRN Favian S Cruz, M.D.        insulin regular (HumuLIN R,NovoLIN R) injection  1-6 Units Subcutaneous 4X/DAY KENNY Cruz M.D.   2 Units at 09/05/23  1229    And    dextrose 50% (D50W) injection 25 g  25 g Intravenous Q15 MIN PRN Favian Cruz M.D.        apixaban (Eliquis) tablet 2.5 mg  2.5 mg Oral BID Favian Cruz M.D.   2.5 mg at 09/05/23 0937    atorvastatin (Lipitor) tablet 20 mg  20 mg Oral Nightly Favian Cruz M.D.   20 mg at 09/04/23 2332    insulin GLARGINE (Lantus,Semglee) injection  4 Units Subcutaneous DAILY Favian Cruz M.D.   4 Units at 09/05/23 0533    levothyroxine (Synthroid) tablet 75 mcg  75 mcg Oral AM ES Favian Cruz M.D.   75 mcg at 09/05/23 0530    mycophenolate (Cellcept) capsule 250 mg  250 mg Oral BID Favian Cruz M.D.   250 mg at 09/05/23 1204    predniSONE (Deltasone) tablet 10 mg  10 mg Oral DAILY Favian Cruz M.D.   10 mg at 09/05/23 0528    HYDROcodone/acetaminophen (Norco)  MG per tablet 1 Tablet  1 Tablet Oral Q6HRS PRN Favian Cruz M.D.   1 Tablet at 09/04/23 2331    tacrolimus (Prograf) capsule 1 mg  1 mg Oral QAM Favian Cruz M.D.   1 mg at 09/05/23 0531    And    tacrolimus (Prograf) capsule 2 mg  2 mg Oral Q EVENING Favian Cruz M.D.   2 mg at 09/04/23 2335       Allergies  No Known Allergies    Physical Exam  Temp:  [36 °C (96.8 °F)-36.9 °C (98.5 °F)] 36.4 °C (97.5 °F)  Pulse:  [] 59  Resp:  [14-24] 16  BP: (107-151)/(50-85) 118/50  SpO2:  [94 %-98 %] 95 %    Physical Exam  Constitutional:       General: She is not in acute distress.     Appearance: She is well-developed.   HENT:      Head: Normocephalic and atraumatic.      Mouth/Throat:      Mouth: Mucous membranes are moist.      Pharynx: Oropharynx is clear. No oropharyngeal exudate.   Eyes:      General: No scleral icterus.     Extraocular Movements: Extraocular movements intact.   Neck:      Trachea: No tracheal deviation.   Cardiovascular:      Rate and Rhythm: Regular rhythm. Tachycardia present.      Heart sounds: Normal heart sounds. No murmur heard.  Pulmonary:      Effort: Pulmonary effort is normal.      Breath sounds:  "No stridor. No rales.   Abdominal:      Palpations: Abdomen is soft.      Tenderness: There is no abdominal tenderness.   Musculoskeletal:         General: Normal range of motion.      Cervical back: Normal range of motion. No rigidity.      Right lower leg: No edema.      Left lower leg: No edema.   Skin:     General: Skin is warm and dry.   Neurological:      General: No focal deficit present.      Mental Status: She is alert and oriented to person, place, and time.   Psychiatric:         Mood and Affect: Mood normal.         Behavior: Behavior normal.     Dialysis access: Left upper arm AV fistula, patent bruit and thrill      Fluids  Date 09/05/23 0700 - 09/06/23 0659   Shift 1724-1832 4824-9633 9283-7626 24 Hour Total   INTAKE   P.O. 600   600   Shift Total 600   600   OUTPUT   Urine 400   400   Shift Total 400   400   Weight (kg) 56.8 56.8 56.8 56.8       Laboratory  Labs reviewed, pertinent labs below.  Recent Labs     09/04/23  1636   WBC 5.6   RBC 4.27   HEMOGLOBIN 11.6*   HEMATOCRIT 37.6   MCV 88.1   MCH 27.2   MCHC 30.9*   RDW 47.9   PLATELETCT 126*   MPV 11.3     Recent Labs     09/04/23  1636 09/05/23  0133   SODIUM 136 135   POTASSIUM 5.5 5.2   CHLORIDE 105 106   CO2 19* 13*   GLUCOSE 196* 141*   BUN 35* 36*   CREATININE 1.51* 1.34   CALCIUM 10.7* 10.1                URINALYSIS:  Lab Results   Component Value Date/Time    COLORURINE Yellow 07/03/2023 0659    CLARITY Hazy (A) 07/03/2023 0659    SPECGRAVITY 1.015 07/03/2023 0659    PHURINE 5.5 07/03/2023 0659    KETONES Negative 07/03/2023 0659    PROTEINURIN Negative 07/03/2023 0659    BILIRUBINUR Negative 07/03/2023 0659    UROBILU 0.2 04/01/2023 1616    NITRITE Negative 07/03/2023 0659    LEUKESTERAS Small (A) 07/03/2023 0659    OCCULTBLOOD Large (A) 07/03/2023 0659     UPC  No results found for: \"TOTPROTUR\" No results found for: \"CREATININEU\"    Imaging interpreted by radiologist. Imaging reports reviewed with pertinent findings " below  DX-CHEST-PORTABLE (1 VIEW)   Final Result      Enlarged cardiac silhouette without evidence of acute disease.      EC-ECHOCARDIOGRAM COMPLETE W/O CONT    (Results Pending)         Assessment/Plan  72 y.o. female with history of diabetes, hypertension, ESRD, previously on hemodialysis, status post  donor kidney transplant [place] [facility], complicated by ureteral stenosis requiring ureteral reimplantation 2023 who presented 2023 with chest pain.    1.  ESRD with transplant CKD stage IIIb.  Patient nonoliguric, kidney function relatively stable.  Avoid NSAIDs and other nephrotoxins.  Check renal function panel daily.    2.  Metabolic acidosis, unclear etiology, particularly unclear why persisting after transplant.  Recommend 1 L of sodium bicarbonate 150 mEq in 1 L of D5W at a rate of 100 mL/h.  Then resume sodium bicarbonate 1300 mg p.o. twice daily.  Check renal function panel daily.    3.  Atrial fibrillation with RVR, reason for admission.  Patient in regular rhythm on my exam.  Appreciate cardiology consultation.    4.  Immunosuppression.  Continue tacrolimus 1 mg in the morning, 10 mg in the evening, mycophenolate 250 mg p.o. twice daily, and reduce prednisone to previous home dose of 5 mg daily.  Check tacrolimus trough level in the morning.    5.  Normocytic anemia, mild.  No need for Epogen.  Check CBC daily.    6.  Thrombocytopenia, mild, persistent.  Patient has had pancytopenia since before her transplant.  Check CBC daily.    7.  Hypertension, controlled.  Continue low-sodium diet.    Discussed with Dr. Kuldip Villar MD  Nephrology  [facility]

## 2023-09-05 NOTE — PROGRESS NOTES
Patient on amiodarone gtt converted back to SB from Afib. HR in the mid 40's. Physician notified.

## 2023-09-05 NOTE — PROGRESS NOTES
Monitor Summary:  Afib 118-125  SB (converted at 1315) 47-51  (R) PAC and PVC  -/.06/-  .19/.07/.37

## 2023-09-05 NOTE — PROGRESS NOTES
Received patient from ER, alert and awake and in no apparent distress, on RA, ambulated to bathroom independently, tele on and heart rhythm and rate verified with tech. Denied pain.  Oriented to unit, safety precautions in place, side rails up x 2, bed to lowest level, wheels locked and bed alarm on. Instructed to use call light for assistance.    Plan of care reviewed with patient, monitor v/s heart rhythm and rate, maintain safety, check lab values, will monitor.

## 2023-09-05 NOTE — PROGRESS NOTES
Bedside report received and patient care assumed. Pt is resting in bed, A&O4 , with no complaints of pain, and is on room air. Tele box on. All fall precautions are in place, belongings at bedside table.  Pt was updated on POC, no questions or concerns. Pt educated on use of call light for assistance.

## 2023-09-05 NOTE — PROGRESS NOTES
"At this time patient stated \"pain is better\" unable to give rate of pain from 1-10, will continue to monitor.   "

## 2023-09-05 NOTE — CARE PLAN
The patient is Stable - Low risk of patient condition declining or worsening    Shift Goals  Clinical Goals: vss, monitor heart rhythm and rate, check lab values  Patient Goals: rest    Progress made toward(s) clinical / shift goals:      Problem: Pain - Standard  Goal: Alleviation of pain or a reduction in pain to the patient’s comfort goal  Description: Target End Date:  Prior to discharge or change in level of care    Document on Vitals flowsheet    1.  Document pain using the appropriate pain scale per order or unit policy  2.  Educate and implement non-pharmacologic comfort measures (i.e. relaxation, distraction, massage, cold/heat therapy, etc.)  3.  Pain management medications as ordered  4.  Reassess pain after pain med administration per policy  5.  If opiods administered assess patient's response to pain medication is appropriate per POSS sedation scale  6.  Follow pain management plan developed in collaboration with patient and interdisciplinary team (including palliative care or pain specialists if applicable)  Outcome: Not Progressing       Patient is not progressing towards the following goals:      Problem: Pain - Standard  Goal: Alleviation of pain or a reduction in pain to the patient’s comfort goal  Description: Target End Date:  Prior to discharge or change in level of care    Document on Vitals flowsheet    1.  Document pain using the appropriate pain scale per order or unit policy  2.  Educate and implement non-pharmacologic comfort measures (i.e. relaxation, distraction, massage, cold/heat therapy, etc.)  3.  Pain management medications as ordered  4.  Reassess pain after pain med administration per policy  5.  If opiods administered assess patient's response to pain medication is appropriate per POSS sedation scale  6.  Follow pain management plan developed in collaboration with patient and interdisciplinary team (including palliative care or pain specialists if applicable)  Outcome: Not  Progressing     Problem: Knowledge Deficit - Standard  Goal: Patient and family/care givers will demonstrate understanding of plan of care, disease process/condition, diagnostic tests and medications  Description: Target End Date:  1-3 days or as soon as patient condition allows    Document in Patient Education    1.  Patient and family/caregiver oriented to unit, equipment, visitation policy and means for communicating concern  2.  Complete/review Learning Assessment  3.  Assess knowledge level of disease process/condition, treatment plan, diagnostic tests and medications  4.  Explain disease process/condition, treatment plan, diagnostic tests and medications  Outcome: Not Progressing

## 2023-09-05 NOTE — CONSULTS
Cardiology Initial Consultation    Date of Service  2023    Referring Physician  Kuldip Hinkle M.D.    Reason for Consultation  Afib with RVR    History of Presenting Illness  Tere Gallegos is a 72 y.o. female with a past medical history of CAD s/p PCI to RCA (), residual nonobstructive CAD in LAD and left circumflex (), end-stage renal disease s/p renal transplant  donor (10/2022), hypertension, T2DM, hyperlipidemia; hypothyroid, paroxysmal A-fib who presented 2023 with persistent A-fib with RVR, fatigue, chest pain.  Patient denies missing any medication doses and is compliant with OAC.    Patient known to cardiovascular team and was last seen by SAMMI Fisher on 2023 where her primary cardiologist is Dr. Pettit.    Patient has had previous hospitalizations for A-fib with RVR, with most recent DCCV on 2023.  Most recent cardiac event monitoring did not show any significant A-fib, less than 1% burden.    Telemetry personally reviewed by myself showing A-fib 130s which has been persistent since admission on 2023.    Echo pending.     services were used in the patient's primary language of Uzbek.     Name or Number: [id number]  Mode of interpretation: iPad    Content of Interpretation:  Per above    Review of Systems  Review of Systems   Constitutional:  Positive for fatigue.   Respiratory:  Positive for chest tightness. Negative for apnea and shortness of breath.    Cardiovascular:  Negative for chest pain, palpitations and leg swelling.       Past Medical History   has a past medical history of Acquired hypothyroidism (2020), CAD (coronary artery disease), Chronic diastolic heart failure (HCC) (2020), Coronary artery disease due to lipid rich plaque, Dental disorder, Diabetes ([facility]), ESRD (end stage renal disease) on dialysis ([facility]) (2020), Hemodialysis patient ([facility]), Hyperlipidemia, Hypertension, Kidney transplant candidate,  Kidney transplant recipient (10/31/2022), Presence of drug-eluting stent in right coronary artery, QT prolongation (2020), RLS (restless legs syndrome) (2016), and Transaminitis (2018).    Surgical History   has a past surgical history that includes recovery (2016); other abdominal surgery; other (Left, ); zzz cardiac cath (2016); zzz cardiac cath (2016); and other abdominal surgery (Right, 10/31/2022).    Family History  family history includes Diabetes in her brother and sister; Other in her sister.    Social History   reports that she has never smoked. She has never used smokeless tobacco. She reports that she does not drink alcohol and does not use drugs.    Medications  Prior to Admission Medications   Prescriptions Last Dose Informant Patient Reported? Taking?   Lancets supply at n/a Patient, Family Member No No   Sig: Use one Freestyle Pearl lancet to test blood sugar once daily .   amLODIPine (NORVASC) 5 MG Tab 2023 at 1000 Patient, Family Member Yes Yes   Sig: Take 5 mg by mouth every day.   apixaban (ELIQUIS) 2.5mg Tab 2023 at 1000 Patient, Family Member No No   Sig: Take 1 Tablet by mouth 2 times a day.   atorvastatin (LIPITOR) 20 MG Tab 9/3/2023 at 1930 Patient, Family Member No No   Sig: Take 1 Tablet by mouth every evening.   furosemide (LASIX) 40 MG Tab 2023 at 1000 Patient, Family Member No No   Sig: Take 1 Tablet by mouth every day.   glucose blood (ONETOUCH VERIO) strip supply at n/a Patient, Family Member No No   Si Strip by Other route as needed (on insulin checking 3-4 times a day).   hydrOXYzine HCl (ATARAX) 25 MG Tab > 1 week at unknown Patient, Family Member No No   Sig: TOME FERNANDO TABLETA DOS VECES AL DESMOND CUANDO SEA NECESARIO PARA LA PICAZON   insulin aspart (NOVOLOG FLEXPEN) 100 UNIT/ML injection PEN 2023 at 1230 Patient, Family Member Yes No   Sig: Inject 4 Units under the skin 3 times a day as needed for High Blood Sugar. If  BS>200=Pt takes 4 units of Insulin  * pt tests before meals*   insulin glargine (LANTUS SOLOSTAR) 100 UNIT/ML Solution Pen-injector injection 9/4/2023 at 1000 Patient, Family Member Yes No   Sig: Inject 4 Units under the skin every day.   levothyroxine (SYNTHROID) 75 MCG Tab 9/4/2023 at 0900 Patient, Family Member No No   Sig: Take 1 Tablet by mouth every morning on an empty stomach.   losartan (COZAAR) 100 MG Tab 9/3/2023 at 1930 Patient, Family Member No No   Sig: Take 1 Tablet by mouth every evening.   metoprolol SR (TOPROL XL) 25 MG TABLET SR 24 HR 9/4/2023 at 0900 Patient, Family Member Yes No   Sig: Take 25 mg by mouth every day.   mycophenolate (CELLCEPT) 250 MG Cap 9/4/2023 at 0900 Patient, Family Member No No   Sig: Take 1 Capsule by mouth 2 times a day.   predniSONE (DELTASONE) 5 MG Tab 9/4/2023 at 0900 Patient, Family Member Yes No   Sig: Take 10 mg by mouth every day.   tacrolimus (PROGRAF) 1 MG Cap 9/4/2023 at 0900 Patient, Family Member Yes No   Sig: Take 1-2 mg by mouth 2 times a day. 1 mg = AM  2 mg = PM      Facility-Administered Medications: None       Allergies  No Known Allergies    Vital signs in last 24 hours  Temp:  [36 °C (96.8 °F)-36.9 °C (98.5 °F)] 36.4 °C (97.5 °F)  Pulse:  [118-140] 118  Resp:  [14-24] 16  BP: (107-151)/(59-85) 107/59  SpO2:  [94 %-98 %] 95 %    Physical Exam  Physical Exam  Constitutional:       General: She is not in acute distress.     Appearance: Normal appearance.   HENT:      Head: Normocephalic and atraumatic.   Eyes:      Extraocular Movements: Extraocular movements intact.      Conjunctiva/sclera: Conjunctivae normal.   Cardiovascular:      Rate and Rhythm: Tachycardia present. Rhythm irregular.      Pulses: Normal pulses.      Heart sounds: Normal heart sounds.   Pulmonary:      Effort: Pulmonary effort is normal. No respiratory distress.      Breath sounds: Examination of the right-lower field reveals decreased breath sounds. Examination of the left-lower field  reveals decreased breath sounds. Decreased breath sounds present.   Musculoskeletal:      Right lower leg: No edema.      Left lower leg: No edema.   Skin:     General: Skin is warm and dry.      Findings: No rash.   Neurological:      Mental Status: She is alert.         Lab Review  Lab Results   Component Value Date/Time    WBC 5.6 2023 04:36 PM    RBC 4.27 2023 04:36 PM    HEMOGLOBIN 11.6 (L) 2023 04:36 PM    HEMATOCRIT 37.6 2023 04:36 PM    MCV 88.1 2023 04:36 PM    MCH 27.2 2023 04:36 PM    MCHC 30.9 (L) 2023 04:36 PM    MPV 11.3 2023 04:36 PM      Lab Results   Component Value Date/Time    SODIUM 135 2023 01:33 AM    POTASSIUM 5.2 2023 01:33 AM    CHLORIDE 106 2023 01:33 AM    CO2 13 (L) 2023 01:33 AM    GLUCOSE 141 (H) 2023 01:33 AM    BUN 36 (H) 2023 01:33 AM    CREATININE 1.34 2023 01:33 AM    BUNCREATRAT 8 (L) 2021 07:00 AM      Lab Results   Component Value Date/Time    ASTSGOT 21 2023 04:36 PM    ALTSGPT 16 2023 04:36 PM     Lab Results   Component Value Date/Time    CHOLSTRLTOT 97 (L) 2023 04:17 AM    LDL 37 2023 04:17 AM    HDL 31 (A) 2023 04:17 AM    TRIGLYCERIDE 144 2023 04:17 AM    TROPONINT 31 (H) 2023 01:33 AM       Recent Labs     23  0133   NTPROBNP 5520*      Latest Reference Range & Units Most Recent   TSH 0.380 - 5.330 uIU/mL 2.780  23 07:48     Cardiac Imaging and Procedures Review  EK2023, A-fib with LVH    Echocardiogram (4/3/2023):  Normal left ventricular size, thickness, systolic function, and   diastolic function.  Mild mitral regurgitation.  Normal inferior vena cava size and inspiratory collapse.  Estimated right ventricular systolic pressure is 40 mmHg.    Cardiac Catheterization (2021):    FINDINGS:  I. HEMODYNAMICS:               Ao: 110/54 mmHg              LEDP: 22 mmHg              Gradient on LV pullback: No     II. LEFT  VENTRICULOGRAM:              LVEF JOHNSON PROJECTION: 70%                III. CORONARY ANGIOGRAPHY:  Left Main: Large caliber bifurcating no CAD.  Left Anterior Descending: Moderate to large caliber vessel with usual complement of diagonals.  Mild nonobstructive CAD.  Left Circumflex: Large caliber Co-dominant supplying multiple large tortuous obtuse marginals with mild nonobstructive CAD.  Right Coronary: Small caliber supplying a moderate-sized RPDA.  This is a codominant fashion.  There is a widely patent long segment of previously placed stents in the midportion.  POSTOPERATIVE DIAGNOSIS:  1.  Nonobstructive coronary artery disease.  2.  Widely patent previously placed RCA stents  3.  LVEF 70%, LVEDP 22 mmHg  Imaging  Chest X-Ray (9/4/2023):  The cardiac silhouette is enlarged. No pulmonary infiltrates or consolidations are noted.  No pleural abnormalities are noted.     Assessment/Plan  Atrial Fib RVR  -Last DCCV 12/18/2023  - ECG A-fib with RVR, probable LVH  -Elevated BNP and cardiomegaly noted on chest x-ray.  -Echocardiogram pending   -Continue home Eliquis 2.5 mg twice daily, Lopressor 25 mg twice daily  -IV Lasix 40 mg daily  -Amio drip  -N.p.o. at midnight, DCCV tomorrow if still in A-fib  -Consider outpatient EP eval    1450 addendum: Patient converted to sinus bradycardia at 1312.  Patient sustaining sinus bradycardia in the 50s.  Hold amnio drip and continued to Amio load with p.o. discussed with Dr. Hinkle.    CAD,  to RCA, left to right collaterals; NOEMI x2 to the RCA on 9/7/2016; HTN  -Trops flat.  Patient reports similar chest pain with previous A-fib with RVR episodes  -Last LDL was 37 on 2/18/2023  -Continue atorvastatin 20 mg daily  -no aspirin due to being on OAC   -BP well controlled at this time.  Consider resuming home antihypertensive therapy for BP greater than 130/80    Acute on chronic kidney disease  -Bicarb drip  -Nephrology following    Thank you for allowing me to participate in the care  of this patient.    I will continue to follow this patient    Please contact me with any questions.    Please see Dr. Bryson's attestation for further details and MDM.     I personally spent a total of 25 minutes which includes face-to-face time and non-face-to-face time spent on preparing to see the patient, reviewing hospital notes and tests, obtaining history from the patient, performing a medically appropriate exam, counseling and educating the patient, ordering medications/tests/procedures/referrals as clinically indicated, and documenting information in the electronic medical record.    MALGORZATA Hyde.   [facility]  [phone]

## 2023-09-05 NOTE — H&P
Hospital Medicine History & Physical Note    Date of Service  9/4/2023    Primary Care Physician  MALGORZATA Concepcion.    Consultants  None    Code Status  Full Code    Chief Complaint  Chief Complaint   Patient presents with    Chest Pain     Left chest pain into left arm, started today at 1300, coming and going.  Pt also tachycardic and feels like heart is racing.  H/o same and has been seen a an ER in the past without dx;  pt reports that they have done a holter monitor without dx.  Pt reports that she has been seen by cardiologist.     Lao transloator used X-IO   ID [id number]   History of Presenting Illness  Tere Gallegos is a 72 y.o. female past medical history of atrial fibrillation, kidney transplant, hyperlipidemia, hypertension who presented 9/4/2023 with palpitations with flushing then noticed around 1:30 PM.  Reports having radiation to left arm and face.  Denies any nausea, vomiting, diaphoresis.  She does report she has been compliant with her medications.  Denies any fevers or chills.  No other relieving setting factors are noted.  Denies any recreational drug, tobacco or alcohol use.    In the ER, she was noted to be in atrial fibrillation with rapid ventricular response given dose of IV metoprolol.  She will be admitted to telemetry floor for optimization of heart rate and echocardiogram.    I discussed the plan of care with patient, family, bedside RN, and Er physician .    Review of Systems  Review of Systems   Constitutional:  Negative for chills and fever.   HENT:  Negative for hearing loss and tinnitus.    Eyes:  Negative for blurred vision and double vision.   Respiratory:  Positive for shortness of breath. Negative for cough and hemoptysis.    Cardiovascular:  Positive for chest pain and palpitations.   Gastrointestinal:  Negative for heartburn and nausea.   Genitourinary:  Negative for dysuria and urgency.   Musculoskeletal:  Negative for myalgias and neck pain.    Neurological:  Negative for dizziness and headaches.   Endo/Heme/Allergies:  Does not bruise/bleed easily.   Psychiatric/Behavioral:  Negative for depression and suicidal ideas.        Past Medical History   has a past medical history of Acquired hypothyroidism (05/04/2020), CAD (coronary artery disease), Chronic diastolic heart failure (HCC) (05/04/2020), Coronary artery disease due to lipid rich plaque, Dental disorder, Diabetes ([facility]), ESRD (end stage renal disease) on dialysis ([facility]) (05/04/2020), Hemodialysis patient ([facility]), Hyperlipidemia, Hypertension, Kidney transplant candidate, Kidney transplant recipient (10/31/2022), Presence of drug-eluting stent in right coronary artery, QT prolongation (01/22/2020), RLS (restless legs syndrome) (08/05/2016), and Transaminitis (12/22/2018).    Surgical History   has a past surgical history that includes recovery (08/16/2016); other abdominal surgery; other (Left, 2014); zzz cardiac cath (08/16/2016); zzz cardiac cath (09/07/2016); and other abdominal surgery (Right, 10/31/2022).     Family History  family history includes Diabetes in her brother and sister; Other in her sister.   Family history reviewed with patient. There is no family history that is pertinent to the chief complaint.     Social History   reports that she has never smoked. She has never used smokeless tobacco. She reports that she does not drink alcohol and does not use drugs.    Allergies  No Known Allergies    Medications  Prior to Admission Medications   Prescriptions Last Dose Informant Patient Reported? Taking?   Lancets supply at n/a Patient, Family Member No No   Sig: Use one Freestyle Pearl lancet to test blood sugar once daily .   amLODIPine (NORVASC) 5 MG Tab 9/4/2023 at 1000 Patient, Family Member Yes Yes   Sig: Take 5 mg by mouth every day.   apixaban (ELIQUIS) 2.5mg Tab 9/4/2023 at 1000 Patient, Family Member No No   Sig: Take 1 Tablet by mouth 2 times a day.   atorvastatin (LIPITOR) 20 MG  Tab 9/3/2023 at 1930 Patient, Family Member No No   Sig: Take 1 Tablet by mouth every evening.   furosemide (LASIX) 40 MG Tab 2023 at 1000 Patient, Family Member No No   Sig: Take 1 Tablet by mouth every day.   glucose blood (ONETOUCH VERIO) strip supply at n/a Patient, Family Member No No   Si Strip by Other route as needed (on insulin checking 3-4 times a day).   hydrOXYzine HCl (ATARAX) 25 MG Tab > 1 week at unknown Patient, Family Member No No   Sig: TOME FERNANDO TABLETA DOS VECES AL DESMOND CUANDO SEA NECESARIO PARA LA PICAZON   insulin aspart (NOVOLOG FLEXPEN) 100 UNIT/ML injection PEN 2023 at 1230 Patient, Family Member Yes No   Sig: Inject 4 Units under the skin 3 times a day as needed for High Blood Sugar. If BS>200=Pt takes 4 units of Insulin  * pt tests before meals*   insulin glargine (LANTUS SOLOSTAR) 100 UNIT/ML Solution Pen-injector injection 2023 at 1000 Patient, Family Member Yes No   Sig: Inject 4 Units under the skin every day.   levothyroxine (SYNTHROID) 75 MCG Tab 2023 at 0900 Patient, Family Member No No   Sig: Take 1 Tablet by mouth every morning on an empty stomach.   losartan (COZAAR) 100 MG Tab 9/3/2023 at 1930 Patient, Family Member No No   Sig: Take 1 Tablet by mouth every evening.   metoprolol SR (TOPROL XL) 25 MG TABLET SR 24 HR 2023 at 0900 Patient, Family Member Yes No   Sig: Take 25 mg by mouth every day.   mycophenolate (CELLCEPT) 250 MG Cap 2023 at 0900 Patient, Family Member No No   Sig: Take 1 Capsule by mouth 2 times a day.   predniSONE (DELTASONE) 5 MG Tab 2023 at 0900 Patient, Family Member Yes No   Sig: Take 10 mg by mouth every day.   tacrolimus (PROGRAF) 1 MG Cap 2023 at 0900 Patient, Family Member Yes No   Sig: Take 1-2 mg by mouth 2 times a day. 1 mg = AM  2 mg = PM      Facility-Administered Medications: None       Physical Exam  Temp:  [36.9 °C (98.4 °F)-36.9 °C (98.5 °F)] 36.9 °C (98.5 °F)  Pulse:  [119-140] 122  Resp:  [18-24] 18  BP:  (116-151)/(66-84) 118/77  SpO2:  [95 %-98 %] 96 %  Blood Pressure : 118/77   Temperature: 36.9 °C (98.5 °F)   Pulse: (!) 122   Respiration: 18   Pulse Oximetry: 96 %       Physical Exam  Vitals and nursing note reviewed.   Constitutional:       Appearance: Normal appearance.   HENT:      Head: Normocephalic and atraumatic.      Right Ear: Tympanic membrane normal.      Left Ear: Tympanic membrane normal.      Nose: Nose normal.      Mouth/Throat:      Mouth: Mucous membranes are dry.      Pharynx: Oropharynx is clear.   Eyes:      Extraocular Movements: Extraocular movements intact.      Pupils: Pupils are equal, round, and reactive to light.   Cardiovascular:      Rate and Rhythm: Tachycardia present. Rhythm irregular.      Pulses: Normal pulses.      Heart sounds: Normal heart sounds.   Pulmonary:      Effort: Pulmonary effort is normal.      Breath sounds: Normal breath sounds.   Abdominal:      General: Bowel sounds are normal. There is no distension.      Palpations: Abdomen is soft. There is no mass.   Musculoskeletal:         General: Normal range of motion.      Cervical back: Neck supple.   Skin:     General: Skin is warm.      Capillary Refill: Capillary refill takes less than 2 seconds.   Neurological:      General: No focal deficit present.      Mental Status: She is alert and oriented to person, place, and time. Mental status is at baseline.   Psychiatric:         Mood and Affect: Mood normal.         Behavior: Behavior normal.         Laboratory:  Recent Labs     09/04/23  1636   WBC 5.6   RBC 4.27   HEMOGLOBIN 11.6*   HEMATOCRIT 37.6   MCV 88.1   MCH 27.2   MCHC 30.9*   RDW 47.9   PLATELETCT 126*   MPV 11.3     Recent Labs     09/04/23  1636   SODIUM 136   POTASSIUM 5.5   CHLORIDE 105   CO2 19*   GLUCOSE 196*   BUN 35*   CREATININE 1.51*   CALCIUM 10.7*     Recent Labs     09/04/23  1636   ALTSGPT 16   ASTSGOT 21   ALKPHOSPHAT 100*   TBILIRUBIN 0.4   GLUCOSE 196*         No results for input(s):  "\"NTPROBNP\" in the last 72 hours.      Recent Labs     09/04/23  1636 09/04/23  1832   TROPONINT 24* 27*       Imaging:  DX-CHEST-PORTABLE (1 VIEW)   Final Result      Enlarged cardiac silhouette without evidence of acute disease.          X-Ray:  I have personally reviewed the images and compared with prior images.  EKG:  I have personally reviewed the images and compared with prior images.    Assessment/Plan:  Justification for Admission Status  I anticipate this patient will require at least two midnights for appropriate medical management, necessitating inpatient admission because atrial fibrillation with RVR, SASHA requiring optimization of HR and monitoring of Cr, respectively.    Patient will need a Telemetry bed on MEDICAL service .  The need is secondary to see above.    * Atrial fibrillation with RVR (HCC)- (present on admission)  Assessment & Plan  Telemetry monitoring  Given 1 dose of IV metoprolol 5 mg.  IV metoprolol has been ordered for heart rate greater than 110  Increased home metoprolol to 50 mg twice daily  Echo 2D  TSH with reflex T4  Supplement IV magnesium 1 g to keep magnesium above 2      Acute kidney injury superimposed on chronic kidney disease (HCC)- (present on admission)  Assessment & Plan  BUN/Cr 23  Gentle IV Fluids total of 500 cc  Avoid nephrotoxic agents  Renally adjust all medications   Bladder scan r/o retention       Permanent atrial fibrillation (HCC)- (present on admission)  Assessment & Plan  Telemetry monitoring  Given 1 dose of IV metoprolol 5 mg.  IV metoprolol has been ordered for heart rate greater than 110  Increased home metoprolol to 50 mg twice daily  Echo 2D  TSH with reflex T4  Supplement IV magnesium 1 g to keep magnesium above 2      Kidney transplant recipient- (present on admission)  Assessment & Plan  Resume immunosuppressives     Acquired hypothyroidism- (present on admission)  Assessment & Plan  Synthroid     Chronic heart failure with preserved ejection " fraction (HCC)- (present on admission)  Assessment & Plan  Appears dry  Gentle IV Fluids  Metoprolol  Hold ARB given SASHA  Echo 2D  Monitor volume status closely     Mixed hyperlipidemia- (present on admission)  Assessment & Plan  atorvastatin     Elevated troponin  Assessment & Plan  Suspect leak secondary to demand A fib and SASHA. Doubt ACS  Serial troponin trending flat   Telemetry monitoring   Echo 2D     Diabetes (HCC)- (present on admission)  Assessment & Plan  LA/SSI + Accu checks + hypoglycemia protocol     Renovascular hypertension- (present on admission)  Assessment & Plan  Resume amlodpine, metoprolol  Hold losartan given SASHA       Critical care  Patient is critically ill.   The patient continues to have: atrial fibrillatin with rapid ventricular response, acute kidney injury  The vital organ system that is affected is the: CVS  If untreated there is a high chance of deterioration into: CVA, shock  And eventually death.   The critical care that I am providing today is:   Review of interval medical and surgical history, current medications and outpatient medication reconciliation, interval imaging studies and radiologist interpretation, and interval laboratory values, frequent hemodynamic monitoring, IV rate control medications for atrial fibrillation with RVR    The critical that has been undertaken is medically complex.   There has been no overlap in critical care time.   Critical Care Time not including procedures: 40 minutes          VTE prophylaxis: SCDs/TEDs and therapeutic anticoagulation with eliquis

## 2023-09-05 NOTE — ED PROVIDER NOTES
ER Provider Note    Scribed for Lester Castillo D.o. by Jayy More. 9/4/2023  5:31 PM    Primary Care Provider: ANGELITA Concepcion    CHIEF COMPLAINT  Chief Complaint   Patient presents with    Chest Pain     Left chest pain into left arm, started today at 1300, coming and going.  Pt also tachycardic and feels like heart is racing.  H/o same and has been seen a an ER in the past without dx;  pt reports that they have done a holter monitor without dx.  Pt reports that she has been seen by cardiologist.     LIMITATION TO HISTORY   Select: Language Latvian,  Used     HPI/ROS  OUTSIDE HISTORIAN(S):  Family Daughter at bedside    EXTERNAL RECORDS REVIEWED  Inpatient Notes      Tere Gallegos is a 72 y.o. female with a history of kidney transplant who presents to the ED for intermittent left-sided chest pain onset 4 hours. The patient reports that the pain radiates to her left arm. Patient was sitting down when the pain began. Patient also reports experiencing palpitations and diaphoresis and states that she has been seen at the ED for a similar complaint in the past but was never given a diagnosis. She reports following a Cardiologist for her symptoms who also has not given her a diagnosis. She denies any recent illnesses, vomiting, or fever. No alleviating or exacerbating factors noted. Patient has a past medical history of CAD, chronic diastolic heart failure, Diabetes, ESRD, Hyperlipidemia, and Hypertension. Patient recently had surgery for ureteral stricture and reports experiencing dysuria since the procedure. Patient is unsure if she is on blood thinners and reports taking anti-rejection medications for her kidney transplant.    PAST MEDICAL HISTORY  Past Medical History:   Diagnosis Date    Acquired hypothyroidism 05/04/2020    CAD (coronary artery disease)     Chronic diastolic heart failure (HCC) 05/04/2020    Coronary artery disease due to lipid rich plaque     2 Synergy  NOEMI to 100% RCA stent placed    Dental disorder     partial dentures- uppers    Diabetes (HCC)     oral medication    ESRD (end stage renal disease) on dialysis ([facility]) 2020    Hemodialysis patient ([facility])     M, W, F    Hyperlipidemia     Hypertension     Kidney transplant candidate     Kidney transplant recipient 10/31/2022    Presence of drug-eluting stent in right coronary artery     QT prolongation 2020    RLS (restless legs syndrome) 2016    Transaminitis 2018       SURGICAL HISTORY  Past Surgical History:   Procedure Laterality Date    OTHER ABDOMINAL SURGERY Right 10/31/2022     Donor kidney transplant - [facility]    ZZZ CARDIAC CATH  2016    RCA stented with 2 Synergy drug-eluting stents.    RECOVERY  2016    Procedure: CATH LAB [facility] WITH POSSIBLE DR. CASTILLO;  Surgeon: [facility];  Location: SURGERY PRE-POST PROC UNIT [facility];  Service:     ZZZ CARDIAC CATH  2016    100% RCA    OTHER Left 2014    left arm upper extremity fistula    OTHER ABDOMINAL SURGERY      left kidney removed due to cancer       FAMILY HISTORY  Family History   Problem Relation Age of Onset    Diabetes Sister     Other Sister         liver disease    Diabetes Brother     Heart Disease Neg Hx        SOCIAL HISTORY   reports that she has never smoked. She has never used smokeless tobacco. She reports that she does not drink alcohol and does not use drugs.    CURRENT MEDICATIONS  Current Discharge Medication List        CONTINUE these medications which have NOT CHANGED    Details   amLODIPine (NORVASC) 5 MG Tab Take 5 mg by mouth every day.      hydrOXYzine HCl (ATARAX) 25 MG Tab TOME FERNANDO TABLETA DOS VECES AL DESMOND CUANDO SEA NECESARIO PARA LA PICAZON  Qty: 30 Tablet, Refills: 5    Associated Diagnoses: Itching      apixaban (ELIQUIS) 2.5mg Tab Take 1 Tablet by mouth 2 times a day.  Qty: 60 Tablet, Refills: 11    Associated Diagnoses: Paroxysmal A-fib (HCC); Secondary  hypercoagulable state (HCC)      metoprolol SR (TOPROL XL) 25 MG TABLET SR 24 HR Take 25 mg by mouth every day.      furosemide (LASIX) 40 MG Tab Take 1 Tablet by mouth every day.  Qty: 90 Tablet, Refills: 3    Associated Diagnoses: Renovascular hypertension      tacrolimus (PROGRAF) 1 MG Cap Take 1-2 mg by mouth 2 times a day. 1 mg = AM  2 mg = PM      mycophenolate (CELLCEPT) 250 MG Cap Take 1 Capsule by mouth 2 times a day.  Qty: 20 Capsule, Refills: 0    Associated Diagnoses: Kidney transplant recipient; Long term current use of immunosuppressive drug      levothyroxine (SYNTHROID) 75 MCG Tab Take 1 Tablet by mouth every morning on an empty stomach.  Qty: 90 Tablet, Refills: 4    Associated Diagnoses: Hypothyroidism, acquired      insulin glargine (LANTUS SOLOSTAR) 100 UNIT/ML Solution Pen-injector injection Inject 4 Units under the skin every day.      losartan (COZAAR) 100 MG Tab Take 1 Tablet by mouth every evening.  Qty: 90 Tablet, Refills: 3    Associated Diagnoses: Acute diastolic CHF (congestive heart failure) ([facility]); Essential hypertension      glucose blood (ONETOUCH VERIO) strip 1 Strip by Other route as needed (on insulin checking 3-4 times a day).  Qty: 150 Strip, Refills: 6    Associated Diagnoses: Controlled type 2 diabetes mellitus with diabetic nephropathy, without long-term current use of insulin ([facility])      atorvastatin (LIPITOR) 20 MG Tab Take 1 Tablet by mouth every evening.  Qty: 100 Tablet, Refills: 3      Lancets Use one Freestyle Pearl lancet to test blood sugar once daily .  Qty: 300 Each, Refills: 3    Comments: Or per formulary preference. ICD-10 code: E11.9 Controlled type 2 Diabetes Mellitus with long term insulin use  Associated Diagnoses: Controlled type 2 diabetes mellitus with chronic kidney disease on chronic dialysis, with long-term current use of insulin ([facility]); Long-term insulin use ([facility])      insulin aspart (NOVOLOG FLEXPEN) 100 UNIT/ML injection PEN Inject 4 Units under the  "skin 3 times a day as needed for High Blood Sugar. If BS>200=Pt takes 4 units of Insulin  * pt tests before meals*      predniSONE (DELTASONE) 5 MG Tab Take 10 mg by mouth every day.             ALLERGIES  Patient has no known allergies.    PHYSICAL EXAM  BP (!) 146/78   Pulse (!) 128   Temp 36.9 °C (98.4 °F) (Temporal)   Resp 19   Ht 1.626 m (5' 4\")   Wt 57.1 kg (125 lb 14.1 oz)   LMP  (LMP Unknown)   SpO2 98%   BMI 21.61 kg/m²       Constitutional: Alert in no apparent distress. Chronically ill-appearing  HENT: No signs of trauma, Bilateral external ears normal, Nose normal.   Eyes: Pupils are equal and reactive, Conjunctiva normal, Non-icteric.   Neck:  No stridor.   Cardiovascular: Regular rate and rhythm, no murmurs.   Thorax & Lungs: Normal breath sounds, No respiratory distress, No wheezing, No chest tenderness.   Abdomen: Bowel sounds normal, Soft, No tenderness, No masses, No peritoneal signs.  Skin: Warm, Dry, No erythema, No rash.   Musculoskeletal:  No major deformities noted.  Neurologic: Alert, moving all extremities without difficulty, no focal deficits.    DIAGNOSTIC STUDIES & PROCEDURES    Labs:   Results for orders placed or performed during the hospital encounter of 09/04/23   CBC with Differential   Result Value Ref Range    WBC 5.6 4.8 - 10.8 K/uL    RBC 4.27 4.20 - 5.40 M/uL    Hemoglobin 11.6 (L) 12.0 - 16.0 g/dL    Hematocrit 37.6 37.0 - 47.0 %    MCV 88.1 81.4 - 97.8 fL    MCH 27.2 27.0 - 33.0 pg    MCHC 30.9 (L) 32.2 - 35.5 g/dL    RDW 47.9 35.9 - 50.0 fL    Platelet Count 126 (L) 164 - 446 K/uL    MPV 11.3 9.0 - 12.9 fL    Neutrophils-Polys 84.40 (H) 44.00 - 72.00 %    Lymphocytes 9.10 (L) 22.00 - 41.00 %    Monocytes 5.70 0.00 - 13.40 %    Eosinophils 0.20 0.00 - 6.90 %    Basophils 0.20 0.00 - 1.80 %    Immature Granulocytes 0.40 0.00 - 0.90 %    Nucleated RBC 0.00 0.00 - 0.20 /100 WBC    Neutrophils (Absolute) 4.74 1.82 - 7.42 K/uL    Lymphs (Absolute) 0.51 (L) 1.00 - 4.80 K/uL "    Monos (Absolute) 0.32 0.00 - 0.85 K/uL    Eos (Absolute) 0.01 0.00 - 0.51 K/uL    Baso (Absolute) 0.01 0.00 - 0.12 K/uL    Immature Granulocytes (abs) 0.02 0.00 - 0.11 K/uL    NRBC (Absolute) 0.00 K/uL   Complete Metabolic Panel (CMP)   Result Value Ref Range    Sodium 136 135 - 145 mmol/L    Potassium 5.5 3.6 - 5.5 mmol/L    Chloride 105 96 - 112 mmol/L    Co2 19 (L) 20 - 33 mmol/L    Anion Gap 12.0 7.0 - 16.0    Glucose 196 (H) 65 - 99 mg/dL    Bun 35 (H) 8 - 22 mg/dL    Creatinine 1.51 (H) 0.50 - 1.40 mg/dL    Calcium 10.7 (H) 8.5 - 10.5 mg/dL    Correct Calcium 10.5 8.5 - 10.5 mg/dL    AST(SGOT) 21 12 - 45 U/L    ALT(SGPT) 16 2 - 50 U/L    Alkaline Phosphatase 100 (H) 30 - 99 U/L    Total Bilirubin 0.4 0.1 - 1.5 mg/dL    Albumin 4.2 3.2 - 4.9 g/dL    Total Protein 7.2 6.0 - 8.2 g/dL    Globulin 3.0 1.9 - 3.5 g/dL    A-G Ratio 1.4 g/dL   Troponins NOW   Result Value Ref Range    Troponin T 24 (H) 6 - 19 ng/L   Troponins in two (2) hours   Result Value Ref Range    Troponin T 27 (H) 6 - 19 ng/L   ESTIMATED GFR   Result Value Ref Range    GFR (CKD-EPI) 36 (A) >60 mL/min/1.73 m 2   PLATELET ESTIMATE   Result Value Ref Range    Plt Estimation Decreased    MAGNESIUM   Result Value Ref Range    Magnesium 1.9 1.5 - 2.5 mg/dL   EKG (NOW)   Result Value Ref Range    Report       [facility] Emergency Dept.    Test Date:  2023  Pt Name:    DIOR NICHOLS    Department: ER  MRN:        [id number]                      Room:  Gender:     Female                       Technician: [id number]  :        [id number]                   Requested By:ER TRIAGE PROTOCOL  Order #:    [id number]                    Maryuri MD: Lester Castillo    Measurements  Intervals                                Axis  Rate:       132                          P:          -60  CT:         146                          QRS:        45  QRSD:       82                           T:          123  QT:         282  QTc:         418    Interpretive Statements  Interpreted by me: Atrial fibrillation, RVR, rate 146, ST segment depressions  in V3 through V6, no STEMI  Electronically Signed On 09- 17:30:43 PDT by Lester Castillo     POCT glucose device results   Result Value Ref Range    POC Glucose, Blood 197 (H) 65 - 99 mg/dL     All labs reviewed by me.    EKG:   I have independently interpreted this EKG as seen above.     Radiology:   The attending Emergency Physician has independently interpreted the diagnostic imaging associated with this visit and is awaiting the final reading from the radiologist, which will be displayed below.    Preliminary interpretation is a follows: No pneumothorax  Radiologist interpretation:    DX-CHEST-PORTABLE (1 VIEW)   Final Result      Enlarged cardiac silhouette without evidence of acute disease.         COURSE & MEDICAL DECISION MAKING    ED Observation Status? Yes; I am placing the patient in to an observation status due to a diagnostic uncertainty as well as therapeutic intensity. Patient placed in observation status at 5:35 PM, 9/4/2023.     Observation plan is as follows: Monitor for symptom management and diagnostic results.    Upon Reevaluation, the patient's condition has: not improved; and will be escalated to hospitalization.    Patient discharged from ED Observation status at 6:40 PM (Time) 09/04/2023 (Date).     INITIAL ASSESSMENT AND PLAN  Care Narrative: Differential includes ACS, PE, pneumonia, cardiac dysrhythmia, heart failure.  No signs of pneumonia on chest x-ray.  Low suspicion for PE as patient has been compliant with anticoagulation and not hypoxic, no signs of DVT.  ECG with A-fib RVR plan for rate control as well as aspirin.  Troponin slightly elevated.  Given patient's symptoms and numerous risk factors spoke with hospitalist regarding mission for further ischemic work-up.          5:31 PM - Patient seen and evaluated at bedside.  Patient will be treated with Lopressor 5  mg and  mg for her symptoms. Ordered EKG, CMP, Troponin, CBC w diff, Platelet estimate, Estimate GFR, Magnesium, and DX-chest to evaluate. I informed the patient that she will likely have to be admitted in order to perform a stress test. She understands and agrees to the plan of care.     6:02 PM - Paged Hospitalist.    6:39 PM - I discussed the patient's case and the above findings with Dr. Cruz (Hospitalist) who agrees to evaluate the patient.    HTN/IDDM FOLLOW UP:  The patient has known hypertension and is being followed by their primary care doctor    ADDITIONAL PROBLEM LIST AND DISPOSITION                 DISPOSITION AND DISCUSSIONS  I have discussed management of the patient with the following physicians and BETTE's: Dr. Cruz (Hospitalist)    Discussion of management with other [facility] or appropriate source(s): None     Escalation of care considered, and ultimately not performed: .    Barriers to care at this time, including but not limited to:  None .     Decision tools and prescription drugs considered including, but not limited to: .    DISPOSITION:  Patient will be hospitalized by Dr. Cruz in guarded condition.    FINAL IMPRESSION   1. Chest pain, unspecified type      I, Jayy More (Scribe), am scribing for, and in the presence of, JAMIE Snider.    Electronically signed by: Jayy More (Manuelibe), 9/4/2023    ILester D* personally performed the services described in this documentation, as scribed by Jayy More in my presence, and it is both accurate and complete.    The note accurately reflects work and decisions made by me.  DANITZA SniderO.  9/5/2023  12:11 AM

## 2023-09-05 NOTE — ASSESSMENT & PLAN NOTE
Suspect leak secondary to demand A fib and SASHA. Doubt ACS  Serial troponin trending flat   Telemetry monitoring   Echo 2D

## 2023-09-05 NOTE — ASSESSMENT & PLAN NOTE
9/5/2023    Telemetry monitoring  Started on amiodarone drip  Cardiology been consulted  Plan for cardioversion if remain A-fib with RVR

## 2023-09-05 NOTE — PROGRESS NOTES
At 0102 patient c/o chest pain, d/t language barrier unable to provide pain rate but able to describe it as heaviness and radiating to left arm, Dr. Cruz notified, new order received, stat ekg done and troponin also drawn, will continue to monitor.    Also given (see MAR) for heart rate over 110bpm.

## 2023-09-05 NOTE — ED NOTES
(Assist RN) Pt ambulatory to BR & back. , a fib on monitor. Given metoprolol. States CP is mild, 2/10.  
Bedside report to Lester ROBLES.   
EKG completed by this tech, signed by ERP, and placed in pt's chart.  
Hospitalist at bedside.   
Med rec updated and complete. Allergies reviewed.  Confirmed name and date of birth.     Interviewed pt/family at bedside as well as family on the phone .      Home pharmacy   [facility] [phone]  ( TRANSPLANT MEDICATIONS ONLY)    [facility] [phone]         ROSS Araiza189  
Provided Pt meal tray.  
Received bedside report; assumed care of Pt.    No oxygen.  No SI precautions.  High fall risk precautions.    Introduced self to Pt; informed her that I am part of her care team.  Rounded on Pt. Updated Pt on plan of care. Pt verbalized understanding.  No acute distress at this time.  Will continue to monitor.  
Repeat troponin collected and sent.   
Implemented All Fall with Harm Risk Interventions:  [place] to call system. Call bell, personal items and telephone within reach. Instruct patient to call for assistance. Room bathroom lighting operational. Non-slip footwear when patient is off stretcher. Physically safe environment: no spills, clutter or unnecessary equipment. Stretcher in lowest position, wheels locked, appropriate side rails in place. Provide visual cue, wrist band, yellow gown, etc. Monitor gait and stability. Monitor for mental status changes and reorient to person, place, and time. Review medications for side effects contributing to fall risk. Reinforce activity limits and safety measures with patient and family. Provide visual clues: red socks.

## 2023-09-06 ENCOUNTER — APPOINTMENT (OUTPATIENT)
Dept: CARDIOLOGY | Facility: MEDICAL CENTER | Age: 73
DRG: 309 | End: 2023-09-06
Attending: STUDENT IN AN ORGANIZED HEALTH CARE EDUCATION/TRAINING PROGRAM
Payer: MEDICARE

## 2023-09-06 ENCOUNTER — APPOINTMENT (OUTPATIENT)
Dept: CARDIOLOGY | Facility: MEDICAL CENTER | Age: 73
DRG: 309 | End: 2023-09-06
Attending: NURSE PRACTITIONER
Payer: MEDICARE

## 2023-09-06 VITALS
RESPIRATION RATE: 16 BRPM | WEIGHT: 135.58 LBS | HEIGHT: 64 IN | SYSTOLIC BLOOD PRESSURE: 150 MMHG | BODY MASS INDEX: 23.15 KG/M2 | HEART RATE: 58 BPM | OXYGEN SATURATION: 94 % | TEMPERATURE: 97.9 F | DIASTOLIC BLOOD PRESSURE: 63 MMHG

## 2023-09-06 LAB
ANION GAP SERPL CALC-SCNC: 11 MMOL/L (ref 7–16)
BUN SERPL-MCNC: 38 MG/DL (ref 8–22)
CALCIUM SERPL-MCNC: 9.2 MG/DL (ref 8.5–10.5)
CHLORIDE SERPL-SCNC: 102 MMOL/L (ref 96–112)
CO2 SERPL-SCNC: 24 MMOL/L (ref 20–33)
CREAT SERPL-MCNC: 1.22 MG/DL (ref 0.5–1.4)
EKG IMPRESSION: NORMAL
EKG IMPRESSION: NORMAL
GFR SERPLBLD CREATININE-BSD FMLA CKD-EPI: 47 ML/MIN/1.73 M 2
GLUCOSE BLD STRIP.AUTO-MCNC: 133 MG/DL (ref 65–99)
GLUCOSE BLD STRIP.AUTO-MCNC: 134 MG/DL (ref 65–99)
GLUCOSE BLD STRIP.AUTO-MCNC: 99 MG/DL (ref 65–99)
GLUCOSE SERPL-MCNC: 110 MG/DL (ref 65–99)
LV EJECT FRACT  99904: 65
LV EJECT FRACT MOD 2C 99903: 67.66
LV EJECT FRACT MOD 4C 99902: 82.42
LV EJECT FRACT MOD BP 99901: 75
POTASSIUM SERPL-SCNC: 4.5 MMOL/L (ref 3.6–5.5)
SODIUM SERPL-SCNC: 137 MMOL/L (ref 135–145)
TACROLIMUS BLD-MCNC: 3 NG/ML (ref 5–20)
TACROLIMUS BLD-MCNC: 3.4 NG/ML (ref 5–20)

## 2023-09-06 PROCEDURE — 93308 TTE F-UP OR LMTD: CPT | Mod: 26 | Performed by: INTERNAL MEDICINE

## 2023-09-06 PROCEDURE — 99232 SBSQ HOSP IP/OBS MODERATE 35: CPT | Performed by: INTERNAL MEDICINE

## 2023-09-06 PROCEDURE — 93005 ELECTROCARDIOGRAM TRACING: CPT | Performed by: NURSE PRACTITIONER

## 2023-09-06 PROCEDURE — 700111 HCHG RX REV CODE 636 W/ 250 OVERRIDE (IP): Mod: JZ | Performed by: INTERNAL MEDICINE

## 2023-09-06 PROCEDURE — 93010 ELECTROCARDIOGRAM REPORT: CPT | Mod: 76 | Performed by: INTERNAL MEDICINE

## 2023-09-06 PROCEDURE — 93325 DOPPLER ECHO COLOR FLOW MAPG: CPT | Mod: 26 | Performed by: INTERNAL MEDICINE

## 2023-09-06 PROCEDURE — 700102 HCHG RX REV CODE 250 W/ 637 OVERRIDE(OP): Performed by: STUDENT IN AN ORGANIZED HEALTH CARE EDUCATION/TRAINING PROGRAM

## 2023-09-06 PROCEDURE — A9270 NON-COVERED ITEM OR SERVICE: HCPCS | Performed by: NURSE PRACTITIONER

## 2023-09-06 PROCEDURE — 700102 HCHG RX REV CODE 250 W/ 637 OVERRIDE(OP): Performed by: INTERNAL MEDICINE

## 2023-09-06 PROCEDURE — 700111 HCHG RX REV CODE 636 W/ 250 OVERRIDE (IP): Performed by: INTERNAL MEDICINE

## 2023-09-06 PROCEDURE — A9270 NON-COVERED ITEM OR SERVICE: HCPCS | Performed by: INTERNAL MEDICINE

## 2023-09-06 PROCEDURE — 99239 HOSP IP/OBS DSCHRG MGMT >30: CPT | Performed by: STUDENT IN AN ORGANIZED HEALTH CARE EDUCATION/TRAINING PROGRAM

## 2023-09-06 PROCEDURE — 36415 COLL VENOUS BLD VENIPUNCTURE: CPT

## 2023-09-06 PROCEDURE — A9270 NON-COVERED ITEM OR SERVICE: HCPCS | Performed by: STUDENT IN AN ORGANIZED HEALTH CARE EDUCATION/TRAINING PROGRAM

## 2023-09-06 PROCEDURE — 93010 ELECTROCARDIOGRAM REPORT: CPT | Performed by: INTERNAL MEDICINE

## 2023-09-06 PROCEDURE — 80197 ASSAY OF TACROLIMUS: CPT

## 2023-09-06 PROCEDURE — 93325 DOPPLER ECHO COLOR FLOW MAPG: CPT

## 2023-09-06 PROCEDURE — 700102 HCHG RX REV CODE 250 W/ 637 OVERRIDE(OP): Performed by: NURSE PRACTITIONER

## 2023-09-06 PROCEDURE — 80048 BASIC METABOLIC PNL TOTAL CA: CPT

## 2023-09-06 PROCEDURE — 700111 HCHG RX REV CODE 636 W/ 250 OVERRIDE (IP): Performed by: STUDENT IN AN ORGANIZED HEALTH CARE EDUCATION/TRAINING PROGRAM

## 2023-09-06 PROCEDURE — 82962 GLUCOSE BLOOD TEST: CPT

## 2023-09-06 PROCEDURE — 93321 DOPPLER ECHO F-UP/LMTD STD: CPT | Mod: 26 | Performed by: INTERNAL MEDICINE

## 2023-09-06 RX ORDER — TACROLIMUS 1 MG/1
2 CAPSULE ORAL EVERY MORNING
Status: DISCONTINUED | OUTPATIENT
Start: 2023-09-07 | End: 2023-09-06 | Stop reason: HOSPADM

## 2023-09-06 RX ORDER — AMIODARONE HYDROCHLORIDE 200 MG/1
200 TABLET ORAL 2 TIMES DAILY
Qty: 14 TABLET | Refills: 0 | Status: SHIPPED | OUTPATIENT
Start: 2023-09-06 | End: 2023-09-06 | Stop reason: SDUPTHER

## 2023-09-06 RX ORDER — AMIODARONE HYDROCHLORIDE 200 MG/1
200 TABLET ORAL DAILY
Qty: 7 TABLET | Refills: 0 | Status: SHIPPED | OUTPATIENT
Start: 2023-09-06 | End: 2023-09-13

## 2023-09-06 RX ORDER — AMIODARONE HYDROCHLORIDE 200 MG/1
200 TABLET ORAL 2 TIMES DAILY
Qty: 56 TABLET | Refills: 0 | Status: SHIPPED | OUTPATIENT
Start: 2023-09-06 | End: 2023-09-06 | Stop reason: SDUPTHER

## 2023-09-06 RX ORDER — AMIODARONE HYDROCHLORIDE 200 MG/1
200 TABLET ORAL DAILY
Qty: 30 TABLET | Refills: 0 | Status: SHIPPED | OUTPATIENT
Start: 2023-09-14 | End: 2023-10-14

## 2023-09-06 RX ORDER — LOSARTAN POTASSIUM 50 MG/1
50 TABLET ORAL
Status: DISCONTINUED | OUTPATIENT
Start: 2023-09-06 | End: 2023-09-06 | Stop reason: HOSPADM

## 2023-09-06 RX ORDER — TACROLIMUS 1 MG/1
2 CAPSULE ORAL EVERY EVENING
Status: DISCONTINUED | OUTPATIENT
Start: 2023-09-06 | End: 2023-09-06 | Stop reason: HOSPADM

## 2023-09-06 RX ORDER — SODIUM BICARBONATE 650 MG/1
1300 TABLET ORAL 2 TIMES DAILY
Qty: 120 TABLET | Refills: 3 | Status: SHIPPED | OUTPATIENT
Start: 2023-09-06 | End: 2023-12-15

## 2023-09-06 RX ORDER — AMIODARONE HYDROCHLORIDE 200 MG/1
200 TABLET ORAL DAILY
Qty: 30 TABLET | Refills: 0 | Status: SHIPPED | OUTPATIENT
Start: 2023-09-20 | End: 2023-09-06

## 2023-09-06 RX ORDER — AMIODARONE HYDROCHLORIDE 200 MG/1
200 TABLET ORAL DAILY
Qty: 56 TABLET | Refills: 0 | Status: SHIPPED | OUTPATIENT
Start: 2023-09-14 | End: 2023-09-06

## 2023-09-06 RX ORDER — AMIODARONE HYDROCHLORIDE 200 MG/1
200 TABLET ORAL 2 TIMES DAILY
Qty: 14 TABLET | Refills: 0 | Status: SHIPPED | OUTPATIENT
Start: 2023-09-06 | End: 2023-09-06

## 2023-09-06 RX ORDER — PRAVASTATIN SODIUM 40 MG
40 TABLET ORAL EVERY EVENING
Qty: 30 TABLET | Refills: 11 | Status: ON HOLD | OUTPATIENT
Start: 2023-09-06 | End: 2023-09-25

## 2023-09-06 RX ORDER — TACROLIMUS 1 MG/1
2 CAPSULE ORAL EVERY MORNING
Qty: 60 CAPSULE | Refills: 3 | Status: SHIPPED | OUTPATIENT
Start: 2023-09-07 | End: 2023-09-06 | Stop reason: SDUPTHER

## 2023-09-06 RX ORDER — TACROLIMUS 1 MG/1
2 CAPSULE ORAL 2 TIMES DAILY
Qty: 60 CAPSULE | Refills: 3 | Status: SHIPPED | OUTPATIENT
Start: 2023-09-06 | End: 2023-09-06

## 2023-09-06 RX ORDER — SODIUM BICARBONATE 650 MG/1
1300 TABLET ORAL 2 TIMES DAILY
Qty: 120 TABLET | Refills: 3 | Status: SHIPPED | OUTPATIENT
Start: 2023-09-06 | End: 2023-09-06 | Stop reason: SDUPTHER

## 2023-09-06 RX ORDER — TACROLIMUS 1 MG/1
2 CAPSULE ORAL EVERY MORNING
Qty: 60 CAPSULE | Refills: 3 | Status: SHIPPED | OUTPATIENT
Start: 2023-09-07 | End: 2023-09-23

## 2023-09-06 RX ADMIN — FUROSEMIDE 40 MG: 10 INJECTION INTRAMUSCULAR; INTRAVENOUS at 06:04

## 2023-09-06 RX ADMIN — AMIODARONE HYDROCHLORIDE 200 MG: 200 TABLET ORAL at 18:41

## 2023-09-06 RX ADMIN — PRAVASTATIN SODIUM 40 MG: 20 TABLET ORAL at 18:43

## 2023-09-06 RX ADMIN — METOPROLOL TARTRATE 25 MG: 25 TABLET, FILM COATED ORAL at 06:04

## 2023-09-06 RX ADMIN — SODIUM BICARBONATE 1300 MG: 650 TABLET ORAL at 06:03

## 2023-09-06 RX ADMIN — LOSARTAN POTASSIUM 50 MG: 50 TABLET, FILM COATED ORAL at 10:56

## 2023-09-06 RX ADMIN — APIXABAN 2.5 MG: 2.5 TABLET, FILM COATED ORAL at 09:23

## 2023-09-06 RX ADMIN — MYCOPHENOLATE MOFETIL 250 MG: 250 CAPSULE ORAL at 10:58

## 2023-09-06 RX ADMIN — INSULIN GLARGINE-YFGN 4 UNITS: 100 INJECTION, SOLUTION SUBCUTANEOUS at 09:18

## 2023-09-06 RX ADMIN — LEVOTHYROXINE SODIUM 75 MCG: 0.07 TABLET ORAL at 06:03

## 2023-09-06 RX ADMIN — TACROLIMUS 2 MG: 1 CAPSULE ORAL at 18:40

## 2023-09-06 RX ADMIN — AMIODARONE HYDROCHLORIDE 200 MG: 200 TABLET ORAL at 06:04

## 2023-09-06 RX ADMIN — SENNOSIDES AND DOCUSATE SODIUM 2 TABLET: 50; 8.6 TABLET ORAL at 18:42

## 2023-09-06 RX ADMIN — PREDNISONE 5 MG: 5 TABLET ORAL at 06:04

## 2023-09-06 RX ADMIN — TACROLIMUS 1 MG: 1 CAPSULE ORAL at 07:55

## 2023-09-06 RX ADMIN — SENNOSIDES AND DOCUSATE SODIUM 2 TABLET: 50; 8.6 TABLET ORAL at 06:03

## 2023-09-06 RX ADMIN — SODIUM BICARBONATE 1300 MG: 650 TABLET ORAL at 18:42

## 2023-09-06 ASSESSMENT — PAIN DESCRIPTION - PAIN TYPE: TYPE: ACUTE PAIN

## 2023-09-06 ASSESSMENT — ENCOUNTER SYMPTOMS
SHORTNESS OF BREATH: 0
FEVER: 0
ABDOMINAL PAIN: 0

## 2023-09-06 NOTE — CARE PLAN
The patient is Stable - Low risk of patient condition declining or worsening    Shift Goals  Clinical Goals: Monitor HR  Patient Goals: go home  Family Goals: TEE    Progress made toward(s) clinical / shift goals:   Problem: Pain - Standard  Goal: Alleviation of pain or a reduction in pain to the patient’s comfort goal  Outcome: Progressing     Problem: Knowledge Deficit - Standard  Goal: Patient and family/care givers will demonstrate understanding of plan of care, disease process/condition, diagnostic tests and medications  Outcome: Progressing

## 2023-09-06 NOTE — PROGRESS NOTES
Cardiology Follow Up Progress Note    Date of Service  9/6/2023    Attending Physician  Kuldip Hinkle M.D.    Chief Complaint   Atrial fibrillation     HPI  Tere Gallegos is a 72 y.o. female admitted 9/4/2023 with Atrial fibrillation    Interim Events  No overnight complaints, no acute events per nursing staff.  Patient has no overnight complaints.  She says she is feeling better    Review of Systems  Review of Systems    Vital signs in last 24 hours  Temp:  [36.5 °C (97.7 °F)-36.9 °C (98.4 °F)] 36.9 °C (98.4 °F)  Pulse:  [50-59] 56  Resp:  [16-19] 19  BP: (118-170)/(50-63) 138/56  SpO2:  [91 %-96 %] 96 %    Physical Exam  Physical Exam  Vitals and nursing note reviewed.   Constitutional:       Appearance: Normal appearance.   HENT:      Head: Normocephalic and atraumatic.      Nose: Nose normal.      Mouth/Throat:      Mouth: Mucous membranes are moist.      Pharynx: Oropharynx is clear.   Eyes:      Pupils: Pupils are equal, round, and reactive to light.   Cardiovascular:      Rate and Rhythm: Normal rate. Rhythm irregular.      Heart sounds: No murmur heard.     No friction rub. No gallop.   Pulmonary:      Effort: Pulmonary effort is normal.      Breath sounds: Normal breath sounds.   Abdominal:      General: Bowel sounds are normal.      Palpations: Abdomen is soft.   Musculoskeletal:         General: Normal range of motion.      Cervical back: Normal range of motion and neck supple.   Skin:     General: Skin is warm and dry.   Neurological:      General: No focal deficit present.      Mental Status: She is alert and oriented to person, place, and time. Mental status is at baseline.   Psychiatric:         Mood and Affect: Mood normal.         Behavior: Behavior normal.         Thought Content: Thought content normal.         Judgment: Judgment normal.         Lab Review  Lab Results   Component Value Date/Time    WBC 5.6 09/04/2023 04:36 PM    RBC 4.27 09/04/2023 04:36 PM    HEMOGLOBIN 11.6 (L)  09/04/2023 04:36 PM    HEMATOCRIT 37.6 09/04/2023 04:36 PM    MCV 88.1 09/04/2023 04:36 PM    MCH 27.2 09/04/2023 04:36 PM    MCHC 30.9 (L) 09/04/2023 04:36 PM    MPV 11.3 09/04/2023 04:36 PM      Lab Results   Component Value Date/Time    SODIUM 137 09/06/2023 02:08 AM    POTASSIUM 4.5 09/06/2023 02:08 AM    CHLORIDE 102 09/06/2023 02:08 AM    CO2 24 09/06/2023 02:08 AM    GLUCOSE 110 (H) 09/06/2023 02:08 AM    BUN 38 (H) 09/06/2023 02:08 AM    CREATININE 1.22 09/06/2023 02:08 AM    BUNCREATRAT 8 (L) 01/28/2021 07:00 AM      Lab Results   Component Value Date/Time    ASTSGOT 21 09/04/2023 04:36 PM    ALTSGPT 16 09/04/2023 04:36 PM     Lab Results   Component Value Date/Time    CHOLSTRLTOT 97 (L) 02/18/2023 04:17 AM    LDL 37 02/18/2023 04:17 AM    HDL 31 (A) 02/18/2023 04:17 AM    TRIGLYCERIDE 144 02/18/2023 04:17 AM    TROPONINT 31 (H) 09/05/2023 01:33 AM       Recent Labs     09/05/23  0133   NTPROBNP 5520*           Assessment/Plan  1.  Atrial fibrillation  2.  Coronary artery disease with prior PCI in 2016 with residual nonobstructive coronary artery disease  3.  End-stage renal disease with subsequent transplant  4.  Hypertension  5.  Type 2 diabetes mellitus    Clinically, she is doing well and not having further significant cardiovascular complaints.  Her blood pressure has been poorly controlled and she is subsequently restarted on her losartan therapy.  Regarding her overall atrial fibrillation she currently still appears to be in atrial fibrillation but is is rate controlled at this time.  At this time no changes are made to her medical therapy.  She will continue the following medications as currently documented without changes      At this time patient is currently stable from a cardiovascular standpoint and may be discharged.      Thank you for allowing me to participate in the care of this patient.  Cardiology will sign off on this patient    Please contact me with any questions.    Sorin Bryson  KAYLIE   Cardiologist, [facility]  [phone]

## 2023-09-06 NOTE — PROGRESS NOTES
Assumed care at 7:30, bedside report received from Estee ROBLES. Pt. Is SB on the monitor. Initial assessment completed, orders reviewed, call light within reach, and hourly rounding in place. POC addressed with patient, no additional questions at this time.

## 2023-09-06 NOTE — CARE PLAN
Problem: Pain - Standard  Goal: Alleviation of pain or a reduction in pain to the patient’s comfort goal  Description: Target End Date:  Prior to discharge or change in level of care    Document on Vitals flowsheet    1.  Document pain using the appropriate pain scale per order or unit policy  2.  Educate and implement non-pharmacologic comfort measures (i.e. relaxation, distraction, massage, cold/heat therapy, etc.)  3.  Pain management medications as ordered  4.  Reassess pain after pain med administration per policy  5.  If opiods administered assess patient's response to pain medication is appropriate per POSS sedation scale  6.  Follow pain management plan developed in collaboration with patient and interdisciplinary team (including palliative care or pain specialists if applicable)  Outcome: Progressing   The patient is Watcher - Medium risk of patient condition declining or worsening    Shift Goals  Clinical Goals: Monitor labs, monitor HR and rhythm  Patient Goals: Rest  Family Goals: TEE    Progress made toward(s) clinical / shift goals:  PT pain is controlled with PRN NOrco.  PT only required dosing times 1 before bed to help alleviate leg pain.      Patient is not progressing towards the following goals:

## 2023-09-06 NOTE — PROGRESS NOTES
Nephrology Daily Progress Note    Date of Service  2023    Chief Complaint  72 y.o. female with history of diabetes, hypertension, ESRD, previously on hemodialysis, status post  donor kidney transplant 10/31/2022 at [facility], complicated by ureteral stenosis requiring ureteral reimplantation 2023 who presented 2023 with chest pain.    Interval Problem Update  -patient says she is urinating well.  Denies chest pain, shortness of breath.  Converted to normal sinus rhythm and occasional sinus bradycardia yesterday afternoon.    Review of Systems  Review of Systems   Constitutional:  Negative for fever.   Respiratory:  Negative for shortness of breath.    Cardiovascular:  Negative for chest pain.   Gastrointestinal:  Negative for abdominal pain.   All other systems reviewed and are negative.       Physical Exam  Temp:  [36.5 °C (97.7 °F)-36.9 °C (98.4 °F)] 36.6 °C (97.9 °F)  Pulse:  [54-61] 61  Resp:  [16-19] 16  BP: (122-170)/(50-63) 142/56  SpO2:  [91 %-96 %] 94 %    Physical Exam  Constitutional:       General: She is not in acute distress.     Appearance: She is well-developed.   HENT:      Head: Normocephalic and atraumatic.      Mouth/Throat:      Mouth: Mucous membranes are moist.      Pharynx: Oropharynx is clear. No oropharyngeal exudate.   Eyes:      General: No scleral icterus.     Extraocular Movements: Extraocular movements intact.   Neck:      Trachea: No tracheal deviation.   Cardiovascular:      Rate and Rhythm: Normal rate and regular rhythm.      Heart sounds: Normal heart sounds. No murmur heard.  Pulmonary:      Effort: Pulmonary effort is normal.      Breath sounds: No stridor. No rales.   Abdominal:      Palpations: Abdomen is soft.      Tenderness: There is no abdominal tenderness.   Musculoskeletal:         General: Normal range of motion.      Cervical back: Normal range of motion. No rigidity.      Right lower leg: No edema.      Left lower leg: No edema.   Skin:      "General: Skin is warm and dry.   Neurological:      General: No focal deficit present.      Mental Status: She is alert and oriented to person, place, and time.   Psychiatric:         Mood and Affect: Mood normal.         Behavior: Behavior normal.     Dialysis access: Left brachiocephalic AV fistula, patent bruit and thrill    Fluids    Intake/Output Summary (Last 24 hours) at 9/6/2023 1631  Last data filed at 9/6/2023 1449  Gross per 24 hour   Intake 1947.09 ml   Output --   Net 1947.09 ml       Laboratory  Labs reviewed, pertinent labs below.  Recent Labs     09/04/23  1636   WBC 5.6   RBC 4.27   HEMOGLOBIN 11.6*   HEMATOCRIT 37.6   MCV 88.1   MCH 27.2   MCHC 30.9*   RDW 47.9   PLATELETCT 126*   MPV 11.3     Recent Labs     09/04/23  1636 09/05/23  0133 09/06/23  0208   SODIUM 136 135 137   POTASSIUM 5.5 5.2 4.5   CHLORIDE 105 106 102   CO2 19* 13* 24   GLUCOSE 196* 141* 110*   BUN 35* 36* 38*   CREATININE 1.51* 1.34 1.22   CALCIUM 10.7* 10.1 9.2               URINALYSIS:  Lab Results   Component Value Date/Time    COLORURINE Yellow 07/03/2023 0659    CLARITY Hazy (A) 07/03/2023 0659    SPECGRAVITY 1.015 07/03/2023 0659    PHURINE 5.5 07/03/2023 0659    KETONES Negative 07/03/2023 0659    PROTEINURIN Negative 07/03/2023 0659    BILIRUBINUR Negative 07/03/2023 0659    UROBILU 0.2 04/01/2023 1616    NITRITE Negative 07/03/2023 0659    LEUKESTERAS Small (A) 07/03/2023 0659    OCCULTBLOOD Large (A) 07/03/2023 0659     UPC  No results found for: \"TOTPROTUR\" No results found for: \"CREATININEU\"      Imaging interpreted by radiologist. Imaging reports reviewed with pertinent findings below  DX-CHEST-PORTABLE (1 VIEW)   Final Result      Enlarged cardiac silhouette without evidence of acute disease.      EC-ECHOCARDIOGRAM COMPLETE W/O CONT    (Results Pending)         Current Facility-Administered Medications   Medication Dose Route Frequency Provider Last Rate Last Admin    losartan (Cozaar) tablet 50 mg  50 mg Oral Q " DAY DANITZA BurrellO.   50 mg at 09/06/23 1056    furosemide (Lasix) injection 40 mg  40 mg Intravenous Q DAY DANITZA BurrellO.   40 mg at 09/06/23 0604    dextrose 5% infusion   Intravenous Continuous LIZETT HydeP.ROctavianoN.   Stopped at 09/05/23 1452    amiodarone (Cordarone) tablet 200 mg  200 mg Oral TWICE DAILY MARKELL Hyde.P.R.N.   200 mg at 09/06/23 0604    metoprolol tartrate (Lopressor) tablet 25 mg  25 mg Oral TWICE DAILY LIZETT HydeP.R.N.   25 mg at 09/06/23 0604    predniSONE (Deltasone) tablet 5 mg  5 mg Oral DAILY Priyank Villar M.D.   5 mg at 09/06/23 0604    sodium bicarbonate tablet 1,300 mg  1,300 mg Oral BID Priyank Villar M.D.   1,300 mg at 09/06/23 0603    pravastatin (Pravachol) tablet 40 mg  40 mg Oral Q EVENING DANITZA BurrellOOctaviano   40 mg at 09/05/23 1824    senna-docusate (Pericolace Or Senokot S) 8.6-50 MG per tablet 2 Tablet  2 Tablet Oral BID Favian Cruz M.D.   2 Tablet at 09/06/23 0603    And    polyethylene glycol/lytes (Miralax) PACKET 1 Packet  1 Packet Oral QDAY SHAMIR Cruz M.D.        And    magnesium hydroxide (Milk Of Magnesia) suspension 30 mL  30 mL Oral QDAY SHAMIR Cruz M.D.        And    bisacodyl (Dulcolax) suppository 10 mg  10 mg Rectal QDAY PRKAYKAY Cruz M.D.        ondansetron (Zofran) syringe/vial injection 4 mg  4 mg Intravenous Q4HRS SHAMIR Cruz M.D.        ondansetron (Zofran ODT) dispertab 4 mg  4 mg Oral Q4HRS PRKAYKAY Cruz M.D.        insulin regular (HumuLIN R,NovoLIN R) injection  1-6 Units Subcutaneous 4X/DAY ACHS Favian S Cruz, M.D.   2 Units at 09/05/23 2101    And    dextrose 50% (D50W) injection 25 g  25 g Intravenous Q15 MIN PRN Favian Crzu M.D.        apixaban (Eliquis) tablet 2.5 mg  2.5 mg Oral BID Favian Cruz M.D.   2.5 mg at 09/06/23 0923    insulin GLARGINE (Lantus,Semglee) injection  4 Units Subcutaneous DAILY Favian Cruz M.D.   4 Units at 09/06/23 0918    levothyroxine  (Synthroid) tablet 75 mcg  75 mcg Oral AM ES Favian Cruz M.D.   75 mcg at 23 0603    mycophenolate (Cellcept) capsule 250 mg  250 mg Oral BID Favian Cruz M.D.   250 mg at 23 1058    HYDROcodone/acetaminophen (Norco)  MG per tablet 1 Tablet  1 Tablet Oral Q6HRS PRN Favian Cruz M.D.   1 Tablet at 23 210    tacrolimus (Prograf) capsule 1 mg  1 mg Oral QAM Favian Cruz M.D.   1 mg at 23 0755    And    tacrolimus (Prograf) capsule 2 mg  2 mg Oral Q EVENING Favian Cruz M.D.   2 mg at 23 1824         Assessment/Plan  72 y.o. female with history of diabetes, hypertension, ESRD, previously on hemodialysis, status post  donor kidney transplant 10/31/2022 at [facility], complicated by ureteral stenosis requiring ureteral reimplantation 2023 who presented 2023 with chest pain.     1.  ESRD with transplant CKD stage IIIb.  Patient nonoliguric, kidney function stable.  Avoid NSAIDs and other nephrotoxins.  Check renal function panel daily while inpatient.     2.  Metabolic acidosis, unclear etiology, particularly unclear why persisting after transplant.  Improved after 1 L of sodium bicarbonate.  Continue sodium bicarb and oral 650 mg p.o. twice daily.  Check renal function panel daily while inpatient.     3.  Atrial fibrillation with RVR, reason for admission.  Converted to regular rhythm today.  Appreciate cardiology consultation.     4.  Immunosuppression.  Tacrolimus level low this morning at 3.0, true trough level.  Recommend increase tacrolimus from 1 mg in the morning and 2 mg in the evening to 2 mg p.o. twice daily.  Continue mycophenolate 250 mg p.o. twice daily, prednisone 5 mg daily.    5.  Normocytic anemia, mild.  No need for Epogen.  Check CBC at least 3 times a week.     6.  Thrombocytopenia, mild, persistent.  Patient has had pancytopenia since before her transplant.  Check CBC daily.     7.  Hypertension, controlled.  Continue low-sodium  diet.  Blood pressure slightly elevated after receiving 1 L of sodium bicarbonate, I expect this to normalize with time.    OK to discharge from Nephrology standpoint.   Discussed with Dr. Kuldip Villar MD  Nephrology  [facility]

## 2023-09-06 NOTE — DISCHARGE SUMMARY
Discharge Summary    CHIEF COMPLAINT ON ADMISSION  Chief Complaint   Patient presents with    Chest Pain     Left chest pain into left arm, started today at 1300, coming and going.  Pt also tachycardic and feels like heart is racing.  H/o same and has been seen a an ER in the past without dx;  pt reports that they have done a holter monitor without dx.  Pt reports that she has been seen by cardiologist.       Reason for Admission  Chest Pain     Admission Date  9/4/2023    CODE STATUS  Full Code    HPI & HOSPITAL COURSE    72 female with past medical history of A-fib on Eliquis, kidney transplant on immunosuppressive, hypertension presented with chest pain and palpitation.  Noted to be in A-fib with RVR in the ED.  Admitted for further management and treatment for A-fib with RVR.  Cardiology recommended  continue amiodarone for 7 days and follow up as outpatient . Metoprolol will be held for bradycardia .  Evaluated by Nephrology . Tacrolimus dose adjusted 2 mg BID.      At the time of discharge ,patient remain  hemodynamically stable ,asymptomatic ,labs remain unremarkable .Echo unremarkable.  Patient will be discharged with close follow up with PCP and cardiology ,Nephrology  .  Advised for medicine compliance.Discharge plan was discussed with patient in details .  Patient agreed with discharge plan  and  all questions answered. Plan discussed with patient' son.      Therefore, she is discharged in good and stable condition to home with close outpatient follow-up.    The patient met 2-midnight criteria for an inpatient stay at the time of discharge.    Discharge Date  9/6/2023      FOLLOW UP ITEMS POST DISCHARGE  9/6/2023      DISCHARGE DIAGNOSES  Principal Problem:    Atrial fibrillation with RVR (HCC) (POA: Yes)  Active Problems:    Renovascular hypertension (POA: Yes)    Diabetes (HCC) (POA: Yes)      Overview: Patient is only on pioglitazone for her diabetes. She used to follow with       endocrinology but has  not visited since December 2021    Elevated troponin (POA: Unknown)    Mixed hyperlipidemia (POA: Yes)    Chronic heart failure with preserved ejection fraction (HCC) (POA: Yes)    Acquired hypothyroidism (POA: Yes)    Kidney transplant recipient (Chronic) (POA: Yes)    Permanent atrial fibrillation (HCC) (Chronic) (POA: Yes)    Acute kidney injury superimposed on chronic kidney disease (HCC) (POA: Yes)    Low bicarbonate level (POA: Unknown)  Resolved Problems:    * No resolved hospital problems. *      FOLLOW UP  Future Appointments   Date Time Provider Department Center   9/14/2023  4:00 PM ANGELITA Concepcion. [facility]   9/19/2023  1:45 PM ANGELITA Cannon None   9/20/2023  3:00 PM ANGELITA GuzmánR JEET Vinson   9/22/2023  4:00 PM Priyank Villar M.D. NE[facility] [address] [place] [address]   11/17/2023  3:00 PM V EXAM 4 [facility]MED None   12/29/2023  4:15 PM Milton Pettit M.D. CAR[facility] None     ANGELITA Concepcion  [address] [facility] [address]  [facility] [id number]  [phone]            MEDICATIONS ON DISCHARGE     Medication List        START taking these medications        Instructions   * amiodarone 200 MG Tabs  Commonly known as: Cordarone   Take 1 Tablet by mouth every day for 7 days.  Dose: 200 mg     * amiodarone 200 MG Tabs  Start taking on: September 14, 2023  Commonly known as: Cordarone   Take 1 Tablet by mouth every day for 30 days.  Dose: 200 mg     pravastatin 40 MG tablet  Commonly known as: Pravachol   Take 1 Tablet by mouth every evening.  Dose: 40 mg     sodium bicarbonate 650 MG Tabs  Commonly known as: Sodium Bicarbonate   Take 2 Tablets by mouth 2 times a day.  Dose: 1,300 mg           * This list has 2 medication(s) that are the same as other medications prescribed for you. Read the directions carefully, and ask your doctor or other care provider to review them with you.                CHANGE how you take these medications        Instructions   tacrolimus 1 MG Caps  Start  taking on: September 7, 2023  What changed:   how much to take  when to take this  additional instructions  Commonly known as: Prograf   Take 2 Capsules by mouth every morning.  Dose: 2 mg            CONTINUE taking these medications        Instructions   amLODIPine 5 MG Tabs  Commonly known as: Norvasc   Take 5 mg by mouth every day.  Dose: 5 mg     apixaban 2.5mg Tabs  Commonly known as: Eliquis   Take 1 Tablet by mouth 2 times a day.  Dose: 2.5 mg     atorvastatin 20 MG Tabs  Commonly known as: Lipitor   Take 1 Tablet by mouth every evening.  Dose: 20 mg     furosemide 40 MG Tabs  Commonly known as: Lasix   Take 1 Tablet by mouth every day.  Dose: 40 mg     hydrOXYzine HCl 25 MG Tabs  Commonly known as: Atarax   TOME FERNANDO TABLETA DOS VECES AL DESMOND CUANDO SEA NECESARIO PARA LA PICAZON     Lancets   Doctor's comments: Or per formulary preference. ICD-10 code: E11.9 Controlled type 2 Diabetes Mellitus with long term insulin use  Use one Freestyle Pearl lancet to test blood sugar once daily .     Lantus SoloStar 100 UNIT/ML Sopn injection  Generic drug: insulin glargine   Inject 4 Units under the skin every day.  Dose: 4 Units     levothyroxine 75 MCG Tabs  Commonly known as: Synthroid   Take 1 Tablet by mouth every morning on an empty stomach.  Dose: 75 mcg     losartan 100 MG Tabs  Commonly known as: Cozaar   Take 1 Tablet by mouth every evening.  Dose: 100 mg     mycophenolate 250 MG Caps  Commonly known as: Cellcept   Take 1 Capsule by mouth 2 times a day.  Dose: 250 mg     NovoLOG FlexPen 100 UNIT/ML injection PEN  Generic drug: insulin aspart   Inject 4 Units under the skin 3 times a day as needed for High Blood Sugar. If BS>200=Pt takes 4 units of Insulin  * pt tests before meals*  Dose: 4 Units     OneTouch Verio strip  Generic drug: glucose blood   1 Strip by Other route as needed (on insulin checking 3-4 times a day).  Dose: 1 Each     predniSONE 5 MG Tabs  Commonly known as: Deltasone   Take 10 mg by mouth  every day.  Dose: 10 mg            STOP taking these medications      metoprolol SR 25 MG Tb24  Commonly known as: Toprol XL              Allergies  No Known Allergies    DIET  Orders Placed This Encounter   Procedures    Diet Order Diet: Consistent CHO (Diabetic)     Standing Status:   Standing     Number of Occurrences:   1     Order Specific Question:   Diet:     Answer:   Consistent CHO (Diabetic) [4]       ACTIVITY  As tolerated.  Weight bearing as tolerated    CONSULTATIONS  Cardiology     PROCEDURES  DX-CHEST-PORTABLE (1 VIEW)   Final Result      Enlarged cardiac silhouette without evidence of acute disease.      EC-ECHOCARDIOGRAM COMPLETE W/O CONT    (Results Pending)         LABORATORY  Lab Results   Component Value Date    SODIUM 137 09/06/2023    POTASSIUM 4.5 09/06/2023    CHLORIDE 102 09/06/2023    CO2 24 09/06/2023    GLUCOSE 110 (H) 09/06/2023    BUN 38 (H) 09/06/2023    CREATININE 1.22 09/06/2023        Lab Results   Component Value Date    WBC 5.6 09/04/2023    HEMOGLOBIN 11.6 (L) 09/04/2023    HEMATOCRIT 37.6 09/04/2023    PLATELETCT 126 (L) 09/04/2023        Total time of the discharge process exceeds 34 minutes.

## 2023-09-07 ENCOUNTER — HOSPITAL ENCOUNTER (OUTPATIENT)
Dept: LAB | Facility: MEDICAL CENTER | Age: 73
End: 2023-09-07
Attending: INTERNAL MEDICINE
Payer: MEDICARE

## 2023-09-07 ENCOUNTER — PATIENT OUTREACH (OUTPATIENT)
Dept: MEDICAL GROUP | Facility: MEDICAL CENTER | Age: 73
End: 2023-09-07
Payer: MEDICARE

## 2023-09-07 DIAGNOSIS — R82.79 BK VIRURIA: ICD-10-CM

## 2023-09-07 DIAGNOSIS — E55.9 VITAMIN D DEFICIENCY: ICD-10-CM

## 2023-09-07 DIAGNOSIS — Z94.0 KIDNEY TRANSPLANT RECIPIENT: Chronic | ICD-10-CM

## 2023-09-07 DIAGNOSIS — D64.9 NORMOCYTIC ANEMIA: ICD-10-CM

## 2023-09-07 DIAGNOSIS — Z79.899 LONG TERM CURRENT USE OF IMMUNOSUPPRESSIVE DRUG: Chronic | ICD-10-CM

## 2023-09-07 LAB
ALBUMIN SERPL BCP-MCNC: 4 G/DL (ref 3.2–4.9)
ALBUMIN/GLOB SERPL: 1.5 G/DL
ALP SERPL-CCNC: 74 U/L (ref 30–99)
ALT SERPL-CCNC: 13 U/L (ref 2–50)
ANION GAP SERPL CALC-SCNC: 10 MMOL/L (ref 7–16)
APPEARANCE UR: ABNORMAL
AST SERPL-CCNC: 17 U/L (ref 12–45)
BACTERIA #/AREA URNS HPF: ABNORMAL /HPF
BASOPHILS # BLD AUTO: 0.4 % (ref 0–1.8)
BASOPHILS # BLD: 0.02 K/UL (ref 0–0.12)
BILIRUB SERPL-MCNC: 0.4 MG/DL (ref 0.1–1.5)
BILIRUB UR QL STRIP.AUTO: NEGATIVE
BUN SERPL-MCNC: 28 MG/DL (ref 8–22)
CALCIUM ALBUM COR SERPL-MCNC: 9.8 MG/DL (ref 8.5–10.5)
CALCIUM SERPL-MCNC: 9.8 MG/DL (ref 8.4–10.2)
CHLORIDE SERPL-SCNC: 103 MMOL/L (ref 96–112)
CO2 SERPL-SCNC: 26 MMOL/L (ref 20–33)
COLOR UR: ABNORMAL
CREAT SERPL-MCNC: 1.32 MG/DL (ref 0.5–1.4)
EOSINOPHIL # BLD AUTO: 0.06 K/UL (ref 0–0.51)
EOSINOPHIL NFR BLD: 1.3 % (ref 0–6.9)
EPI CELLS #/AREA URNS HPF: ABNORMAL /HPF
ERYTHROCYTE [DISTWIDTH] IN BLOOD BY AUTOMATED COUNT: 47.5 FL (ref 35.9–50)
GFR SERPLBLD CREATININE-BSD FMLA CKD-EPI: 42 ML/MIN/1.73 M 2
GLOBULIN SER CALC-MCNC: 2.6 G/DL (ref 1.9–3.5)
GLUCOSE SERPL-MCNC: 93 MG/DL (ref 65–99)
GLUCOSE UR STRIP.AUTO-MCNC: NEGATIVE MG/DL
HCT VFR BLD AUTO: 36.4 % (ref 37–47)
HGB BLD-MCNC: 11.3 G/DL (ref 12–16)
IMM GRANULOCYTES # BLD AUTO: 0.01 K/UL (ref 0–0.11)
IMM GRANULOCYTES NFR BLD AUTO: 0.2 % (ref 0–0.9)
KETONES UR STRIP.AUTO-MCNC: NEGATIVE MG/DL
LEUKOCYTE ESTERASE UR QL STRIP.AUTO: ABNORMAL
LYMPHOCYTES # BLD AUTO: 0.8 K/UL (ref 1–4.8)
LYMPHOCYTES NFR BLD: 17.4 % (ref 22–41)
MCH RBC QN AUTO: 28 PG (ref 27–33)
MCHC RBC AUTO-ENTMCNC: 31 G/DL (ref 32.2–35.5)
MCV RBC AUTO: 90.1 FL (ref 81.4–97.8)
MICRO URNS: ABNORMAL
MONOCYTES # BLD AUTO: 0.46 K/UL (ref 0–0.85)
MONOCYTES NFR BLD AUTO: 10 % (ref 0–13.4)
NEUTROPHILS # BLD AUTO: 3.24 K/UL (ref 1.82–7.42)
NEUTROPHILS NFR BLD: 70.7 % (ref 44–72)
NITRITE UR QL STRIP.AUTO: POSITIVE
NRBC # BLD AUTO: 0 K/UL
NRBC BLD-RTO: 0 /100 WBC (ref 0–0.2)
PH UR STRIP.AUTO: 7 [PH] (ref 5–8)
PLATELET # BLD AUTO: 111 K/UL (ref 164–446)
PMV BLD AUTO: 12 FL (ref 9–12.9)
POTASSIUM SERPL-SCNC: 4.3 MMOL/L (ref 3.6–5.5)
PROT SERPL-MCNC: 6.6 G/DL (ref 6–8.2)
PROT UR QL STRIP: NEGATIVE MG/DL
RBC # BLD AUTO: 4.04 M/UL (ref 4.2–5.4)
RBC # URNS HPF: ABNORMAL /HPF
RBC UR QL AUTO: ABNORMAL
SODIUM SERPL-SCNC: 139 MMOL/L (ref 135–145)
SP GR UR STRIP.AUTO: 1.01
TACROLIMUS BLD-MCNC: 2.9 NG/ML (ref 5–20)
WBC # BLD AUTO: 4.6 K/UL (ref 4.8–10.8)
WBC #/AREA URNS HPF: ABNORMAL /HPF

## 2023-09-07 PROCEDURE — 87186 SC STD MICRODIL/AGAR DIL: CPT

## 2023-09-07 PROCEDURE — 80197 ASSAY OF TACROLIMUS: CPT

## 2023-09-07 PROCEDURE — 80053 COMPREHEN METABOLIC PANEL: CPT

## 2023-09-07 PROCEDURE — 81001 URINALYSIS AUTO W/SCOPE: CPT

## 2023-09-07 PROCEDURE — 87799 DETECT AGENT NOS DNA QUANT: CPT | Mod: 91

## 2023-09-07 PROCEDURE — 85025 COMPLETE CBC W/AUTO DIFF WBC: CPT

## 2023-09-07 PROCEDURE — 87086 URINE CULTURE/COLONY COUNT: CPT

## 2023-09-07 PROCEDURE — 87077 CULTURE AEROBIC IDENTIFY: CPT

## 2023-09-07 PROCEDURE — 36415 COLL VENOUS BLD VENIPUNCTURE: CPT

## 2023-09-07 NOTE — PROGRESS NOTES
Transitional Care Management  TCM Outreach Date and Time: Filed (9/7/2023  8:25 AM)    Discharge Questions  Actual Discharge Date: 09/06/23  Now that you are home, how are you feeling?: Good  Did you receive any new prescriptions?: Yes  Were you able to get them filled?: -- (spoke with son, he is unsure if all meds were picked up, but will check and let us know if not.)  Meds to Bed or Pharmacy filled?: Pharmacy  Do you have any questions about your current medications or new medications (Review Med Rec)?: No  Do you have a follow up appointment scheduled with your PCP?: Yes  Appointment Date: 09/14/23  Appointment Time: 1540  Any issues or paperwork you wish to discuss with your PCP?: No  Does this patient qualify for the CCM program?: Yes    Transitional Care  Number of attempts made to contact patient: 1  Current or previous attempts competed within two business days of discharge? : Yes  Provided education regarding treatment plan, medications, self-management, ADLs?: Yes  Has patient completed an Advanced Directive?: No  Has the Care Manager's phone number provided?: Yes  Is there anything else I can help you with?: No    Discharge Summary  Chief Complaint: Chest Pain - Left chest pain into left arm, started today at 1300, coming and going.  Pt also tachycardic and feels like heart is racing.  H/o same and has been seen a an ER in the past without dx;  pt reports that they have done a holter monitor without dx.  Pt reports that she has been seen by cardiologist.  Admitting Diagnosis: chest pain  Discharge Diagnosis: afib w/RVR

## 2023-09-08 ENCOUNTER — OFFICE VISIT (OUTPATIENT)
Dept: MEDICAL GROUP | Facility: MEDICAL CENTER | Age: 73
End: 2023-09-08
Payer: MEDICARE

## 2023-09-08 VITALS
TEMPERATURE: 97 F | HEART RATE: 54 BPM | OXYGEN SATURATION: 98 % | SYSTOLIC BLOOD PRESSURE: 132 MMHG | HEIGHT: 59 IN | WEIGHT: 123 LBS | DIASTOLIC BLOOD PRESSURE: 74 MMHG | BODY MASS INDEX: 24.8 KG/M2

## 2023-09-08 DIAGNOSIS — N30.00 ACUTE CYSTITIS WITHOUT HEMATURIA: ICD-10-CM

## 2023-09-08 DIAGNOSIS — Z79.4 TYPE 2 DIABETES MELLITUS WITH OTHER SPECIFIED COMPLICATION, WITH LONG-TERM CURRENT USE OF INSULIN (HCC): ICD-10-CM

## 2023-09-08 DIAGNOSIS — E11.69 TYPE 2 DIABETES MELLITUS WITH OTHER SPECIFIED COMPLICATION, WITH LONG-TERM CURRENT USE OF INSULIN (HCC): ICD-10-CM

## 2023-09-08 DIAGNOSIS — I15.0 RENOVASCULAR HYPERTENSION: ICD-10-CM

## 2023-09-08 DIAGNOSIS — I48.21 PERMANENT ATRIAL FIBRILLATION (HCC): Chronic | ICD-10-CM

## 2023-09-08 PROBLEM — R30.0 DYSURIA: Status: ACTIVE | Noted: 2023-09-08

## 2023-09-08 LAB
BK PLASMA INTERP, QNT NAAT NL11711: DETECTED
BK PLASMA IU/ML, QNT NAAT NL11709: 314 IU/ML
BK PLASMA LOG IU/ML, QNT NAAT NL11710: 2.5 LOG IU/ML

## 2023-09-08 PROCEDURE — 99214 OFFICE O/P EST MOD 30 MIN: CPT | Performed by: NURSE PRACTITIONER

## 2023-09-08 PROCEDURE — 3078F DIAST BP <80 MM HG: CPT | Performed by: NURSE PRACTITIONER

## 2023-09-08 PROCEDURE — 3075F SYST BP GE 130 - 139MM HG: CPT | Performed by: NURSE PRACTITIONER

## 2023-09-08 RX ORDER — AMLODIPINE BESYLATE 5 MG/1
5 TABLET ORAL DAILY
Qty: 90 TABLET | Refills: 3 | Status: SHIPPED | OUTPATIENT
Start: 2023-09-08 | End: 2024-01-10

## 2023-09-08 RX ORDER — SULFAMETHOXAZOLE AND TRIMETHOPRIM 800; 160 MG/1; MG/1
1 TABLET ORAL 2 TIMES DAILY
Qty: 14 TABLET | Refills: 0 | Status: SHIPPED | OUTPATIENT
Start: 2023-09-08 | End: 2023-09-15

## 2023-09-08 RX ORDER — LANCETS 30 GAUGE
EACH MISCELLANEOUS
Qty: 100 EACH | Refills: 3 | Status: SHIPPED | OUTPATIENT
Start: 2023-09-08 | End: 2023-09-22

## 2023-09-08 RX ORDER — GLUCOSAMINE HCL/CHONDROITIN SU 500-400 MG
CAPSULE ORAL
Qty: 100 EACH | Refills: 3 | Status: SHIPPED | OUTPATIENT
Start: 2023-09-08 | End: 2023-09-22

## 2023-09-08 ASSESSMENT — FIBROSIS 4 INDEX: FIB4 SCORE: 3.1

## 2023-09-08 NOTE — ASSESSMENT & PLAN NOTE
72 female with past medical history of A-fib on Eliquis, kidney transplant on immunosuppressive, hypertension presented with chest pain and palpitation.  Noted to be in A-fib with RVR in the ED.  Admitted for further management and treatment for A-fib with RVR.  Cardiology recommended  continue amiodarone for 7 days and follow up as outpatient . Metoprolol will be held for bradycardia .  Evaluated by Nephrology . Tacrolimus dose adjusted 2 mg BID.        At the time of discharge ,patient remain  hemodynamically stable ,asymptomatic ,labs remain unremarkable .Echo unremarkable.  Patient will be discharged with close follow up with PCP and cardiology ,Nephrology  .  Advised for medicine compliance.Discharge plan was discussed with patient in details .  Patient agreed with discharge plan  and  all questions answered. Plan discussed with patient' son.

## 2023-09-09 LAB
BACTERIA UR CULT: ABNORMAL
BACTERIA UR CULT: ABNORMAL
BK UR INTERP, QNT NAAT NL11708: DETECTED
BK UR IU/ML, QNT NAAT NL11706: ABNORMAL IU/ML
BK UR LOG IU/ML, QNT NAAT NL11707: 5.38 LOG IU/ML
SIGNIFICANT IND 70042: ABNORMAL
SITE SITE: ABNORMAL
SOURCE SOURCE: ABNORMAL

## 2023-09-14 ENCOUNTER — HOSPITAL ENCOUNTER (OUTPATIENT)
Dept: LAB | Facility: MEDICAL CENTER | Age: 73
End: 2023-09-14
Attending: PHYSICIAN ASSISTANT
Payer: MEDICARE

## 2023-09-14 ENCOUNTER — APPOINTMENT (OUTPATIENT)
Dept: MEDICAL GROUP | Facility: MEDICAL CENTER | Age: 73
End: 2023-09-14
Payer: MEDICARE

## 2023-09-14 DIAGNOSIS — Z94.0 KIDNEY TRANSPLANT RECIPIENT: Chronic | ICD-10-CM

## 2023-09-14 DIAGNOSIS — Z79.899 LONG TERM CURRENT USE OF IMMUNOSUPPRESSIVE DRUG: Chronic | ICD-10-CM

## 2023-09-14 DIAGNOSIS — E55.9 VITAMIN D DEFICIENCY: ICD-10-CM

## 2023-09-14 DIAGNOSIS — D64.9 NORMOCYTIC ANEMIA: ICD-10-CM

## 2023-09-14 DIAGNOSIS — R82.79 BK VIRURIA: ICD-10-CM

## 2023-09-14 LAB
25(OH)D3 SERPL-MCNC: 14 NG/ML (ref 30–100)
ALBUMIN SERPL BCP-MCNC: 3.9 G/DL (ref 3.2–4.9)
AMORPH CRY #/AREA URNS HPF: PRESENT /HPF
ANION GAP SERPL CALC-SCNC: 10 MMOL/L (ref 7–16)
APPEARANCE UR: CLEAR
BACTERIA #/AREA URNS HPF: ABNORMAL /HPF
BASOPHILS # BLD AUTO: 0.3 % (ref 0–1.8)
BASOPHILS # BLD: 0.01 K/UL (ref 0–0.12)
BILIRUB UR QL STRIP.AUTO: NEGATIVE
BUN SERPL-MCNC: 35 MG/DL (ref 8–22)
CALCIUM SERPL-MCNC: 9.8 MG/DL (ref 8.4–10.2)
CHLORIDE SERPL-SCNC: 107 MMOL/L (ref 96–112)
CO2 SERPL-SCNC: 18 MMOL/L (ref 20–33)
COLOR UR: YELLOW
CREAT SERPL-MCNC: 1.93 MG/DL (ref 0.5–1.4)
CREAT UR-MCNC: 81.98 MG/DL
CREAT UR-MCNC: 85.11 MG/DL
EOSINOPHIL # BLD AUTO: 0.03 K/UL (ref 0–0.51)
EOSINOPHIL NFR BLD: 0.8 % (ref 0–6.9)
EPI CELLS #/AREA URNS HPF: ABNORMAL /HPF
ERYTHROCYTE [DISTWIDTH] IN BLOOD BY AUTOMATED COUNT: 49.1 FL (ref 35.9–50)
ERYTHROCYTE [DISTWIDTH] IN BLOOD BY AUTOMATED COUNT: 49.2 FL (ref 35.9–50)
FASTING STATUS PATIENT QL REPORTED: NORMAL
FERRITIN SERPL-MCNC: 1251 NG/ML (ref 10–291)
GFR SERPLBLD CREATININE-BSD FMLA CKD-EPI: 27 ML/MIN/1.73 M 2
GLUCOSE SERPL-MCNC: 94 MG/DL (ref 65–99)
GLUCOSE UR STRIP.AUTO-MCNC: NEGATIVE MG/DL
HCT VFR BLD AUTO: 34.3 % (ref 37–47)
HCT VFR BLD AUTO: 35.2 % (ref 37–47)
HGB BLD-MCNC: 10.7 G/DL (ref 12–16)
HGB BLD-MCNC: 10.8 G/DL (ref 12–16)
IMM GRANULOCYTES # BLD AUTO: 0.02 K/UL (ref 0–0.11)
IMM GRANULOCYTES NFR BLD AUTO: 0.5 % (ref 0–0.9)
IRON SATN MFR SERPL: 22 % (ref 15–55)
IRON SERPL-MCNC: 51 UG/DL (ref 40–170)
KETONES UR STRIP.AUTO-MCNC: NEGATIVE MG/DL
LEUKOCYTE ESTERASE UR QL STRIP.AUTO: ABNORMAL
LYMPHOCYTES # BLD AUTO: 0.73 K/UL (ref 1–4.8)
LYMPHOCYTES NFR BLD: 19.6 % (ref 22–41)
MCH RBC QN AUTO: 27.9 PG (ref 27–33)
MCH RBC QN AUTO: 27.9 PG (ref 27–33)
MCHC RBC AUTO-ENTMCNC: 30.7 G/DL (ref 32.2–35.5)
MCHC RBC AUTO-ENTMCNC: 31.2 G/DL (ref 32.2–35.5)
MCV RBC AUTO: 89.6 FL (ref 81.4–97.8)
MCV RBC AUTO: 91 FL (ref 81.4–97.8)
MICRO URNS: ABNORMAL
MICROALBUMIN UR-MCNC: 6.2 MG/DL
MICROALBUMIN/CREAT UR: 73 MG/G (ref 0–30)
MONOCYTES # BLD AUTO: 0.28 K/UL (ref 0–0.85)
MONOCYTES NFR BLD AUTO: 7.5 % (ref 0–13.4)
NEUTROPHILS # BLD AUTO: 2.66 K/UL (ref 1.82–7.42)
NEUTROPHILS NFR BLD: 71.3 % (ref 44–72)
NITRITE UR QL STRIP.AUTO: NEGATIVE
NRBC # BLD AUTO: 0 K/UL
NRBC BLD-RTO: 0 /100 WBC (ref 0–0.2)
PH UR STRIP.AUTO: 6.5 [PH] (ref 5–8)
PHOSPHATE SERPL-MCNC: 2.8 MG/DL (ref 2.5–4.5)
PLATELET # BLD AUTO: 121 K/UL (ref 164–446)
PLATELET # BLD AUTO: 127 K/UL (ref 164–446)
PMV BLD AUTO: 11.1 FL (ref 9–12.9)
PMV BLD AUTO: 11.5 FL (ref 9–12.9)
POTASSIUM SERPL-SCNC: 5.7 MMOL/L (ref 3.6–5.5)
PROT UR QL STRIP: NEGATIVE MG/DL
PROT UR-MCNC: 16 MG/DL (ref 0–15)
PROT/CREAT UR: 195 MG/G (ref 10–107)
PTH-INTACT SERPL-MCNC: 103 PG/ML (ref 14–72)
RBC # BLD AUTO: 3.83 M/UL (ref 4.2–5.4)
RBC # BLD AUTO: 3.87 M/UL (ref 4.2–5.4)
RBC # URNS HPF: ABNORMAL /HPF
RBC UR QL AUTO: ABNORMAL
SODIUM SERPL-SCNC: 135 MMOL/L (ref 135–145)
SP GR UR STRIP.AUTO: 1.02
TIBC SERPL-MCNC: 229 UG/DL (ref 250–450)
UIBC SERPL-MCNC: 178 UG/DL (ref 110–370)
WBC # BLD AUTO: 3.7 K/UL (ref 4.8–10.8)
WBC # BLD AUTO: 3.8 K/UL (ref 4.8–10.8)
WBC #/AREA URNS HPF: ABNORMAL /HPF
YEAST BUDDING URNS QL: PRESENT /HPF
YEAST HYPHAE #/AREA URNS HPF: PRESENT /HPF

## 2023-09-14 PROCEDURE — 87086 URINE CULTURE/COLONY COUNT: CPT

## 2023-09-14 PROCEDURE — 82043 UR ALBUMIN QUANTITATIVE: CPT

## 2023-09-14 PROCEDURE — 82306 VITAMIN D 25 HYDROXY: CPT

## 2023-09-14 PROCEDURE — 82728 ASSAY OF FERRITIN: CPT

## 2023-09-14 PROCEDURE — 83970 ASSAY OF PARATHORMONE: CPT

## 2023-09-14 PROCEDURE — 87186 SC STD MICRODIL/AGAR DIL: CPT

## 2023-09-14 PROCEDURE — 80048 BASIC METABOLIC PNL TOTAL CA: CPT

## 2023-09-14 PROCEDURE — 84156 ASSAY OF PROTEIN URINE: CPT

## 2023-09-14 PROCEDURE — 81001 URINALYSIS AUTO W/SCOPE: CPT

## 2023-09-14 PROCEDURE — 85027 COMPLETE CBC AUTOMATED: CPT | Mod: XU

## 2023-09-14 PROCEDURE — 83550 IRON BINDING TEST: CPT

## 2023-09-14 PROCEDURE — 83540 ASSAY OF IRON: CPT

## 2023-09-14 PROCEDURE — 84100 ASSAY OF PHOSPHORUS: CPT

## 2023-09-14 PROCEDURE — 82040 ASSAY OF SERUM ALBUMIN: CPT

## 2023-09-14 PROCEDURE — 87077 CULTURE AEROBIC IDENTIFY: CPT

## 2023-09-14 PROCEDURE — 85025 COMPLETE CBC W/AUTO DIFF WBC: CPT

## 2023-09-14 PROCEDURE — 80197 ASSAY OF TACROLIMUS: CPT

## 2023-09-14 PROCEDURE — 36415 COLL VENOUS BLD VENIPUNCTURE: CPT

## 2023-09-14 PROCEDURE — 82570 ASSAY OF URINE CREATININE: CPT

## 2023-09-15 LAB — TACROLIMUS BLD-MCNC: 4.4 NG/ML (ref 5–20)

## 2023-09-16 LAB
BACTERIA UR CULT: ABNORMAL
BACTERIA UR CULT: ABNORMAL
SIGNIFICANT IND 70042: ABNORMAL
SITE SITE: ABNORMAL
SOURCE SOURCE: ABNORMAL

## 2023-09-19 ENCOUNTER — APPOINTMENT (OUTPATIENT)
Dept: CARDIOLOGY | Facility: MEDICAL CENTER | Age: 73
End: 2023-09-19
Payer: MEDICARE

## 2023-09-20 ENCOUNTER — OFFICE VISIT (OUTPATIENT)
Dept: ENDOCRINOLOGY | Facility: MEDICAL CENTER | Age: 73
DRG: 690 | End: 2023-09-20
Payer: MEDICARE

## 2023-09-20 VITALS
OXYGEN SATURATION: 98 % | HEART RATE: 54 BPM | DIASTOLIC BLOOD PRESSURE: 52 MMHG | BODY MASS INDEX: 24.8 KG/M2 | SYSTOLIC BLOOD PRESSURE: 134 MMHG | HEIGHT: 59 IN | WEIGHT: 123 LBS

## 2023-09-20 DIAGNOSIS — Z79.4 TYPE 2 DIABETES MELLITUS WITH OTHER SPECIFIED COMPLICATION, WITH LONG-TERM CURRENT USE OF INSULIN (HCC): ICD-10-CM

## 2023-09-20 DIAGNOSIS — E55.9 VITAMIN D DEFICIENCY: ICD-10-CM

## 2023-09-20 DIAGNOSIS — I25.10 CARDIOVASCULAR DISEASE: ICD-10-CM

## 2023-09-20 DIAGNOSIS — I10 ESSENTIAL HYPERTENSION: ICD-10-CM

## 2023-09-20 DIAGNOSIS — E78.5 DYSLIPIDEMIA: ICD-10-CM

## 2023-09-20 DIAGNOSIS — E03.9 HYPOTHYROIDISM, ACQUIRED: ICD-10-CM

## 2023-09-20 DIAGNOSIS — Z79.4 TYPE 2 DIABETES MELLITUS WITHOUT COMPLICATION, WITH LONG-TERM CURRENT USE OF INSULIN (HCC): ICD-10-CM

## 2023-09-20 DIAGNOSIS — G62.9 POLYNEUROPATHY: ICD-10-CM

## 2023-09-20 DIAGNOSIS — Z94.0 KIDNEY TRANSPLANT STATUS: ICD-10-CM

## 2023-09-20 DIAGNOSIS — E11.9 TYPE 2 DIABETES MELLITUS WITHOUT COMPLICATION, WITH LONG-TERM CURRENT USE OF INSULIN (HCC): ICD-10-CM

## 2023-09-20 DIAGNOSIS — E11.69 TYPE 2 DIABETES MELLITUS WITH OTHER SPECIFIED COMPLICATION, WITH LONG-TERM CURRENT USE OF INSULIN (HCC): ICD-10-CM

## 2023-09-20 PROCEDURE — 99214 OFFICE O/P EST MOD 30 MIN: CPT

## 2023-09-20 PROCEDURE — 3078F DIAST BP <80 MM HG: CPT

## 2023-09-20 PROCEDURE — 3075F SYST BP GE 130 - 139MM HG: CPT

## 2023-09-20 PROCEDURE — 99212 OFFICE O/P EST SF 10 MIN: CPT

## 2023-09-20 ASSESSMENT — FIBROSIS 4 INDEX: FIB4 SCORE: 2.71

## 2023-09-20 NOTE — PROGRESS NOTES
"Chief Complaint: Follow-up on the following conditions  HPI:   Tere Gallegos is a 72 y.o. female    1. Controlled type 2 diabetes mellitus with hyperglycemia:   Type 2 Diabetes Mellitus diagnosed 20 years ago.      She checks her blood sugars 3x/day, discontinued 7?  Fasting blood sugars-95      POC a1c on 6/7/2023 at 6.2%  POC a1c on 02/18/2023 6.0%  POC a1c on 1/03/2023 at 5.8%  Last A1C on 11/8/21 at 5.7%, she was a dialysis patient which makes A1c unreliable.    Diabetes management:  Lantus 5 units-  Novolg 4 units if BG >200 2 units before each meal plus a sliding scale  151-200  1 unit  200-250 2 units  2  3 units  301-350 4 units  351-400 5 units  401-450 6 units    She denies hypoglycemic episodes.    She  denies hypoglycemic unawareness.   She denies episodes of severe hypoglycemia requiring third party assistance.      Diet: \"healthy\" diet  in general  Exercise: minimal     Diabetes Complications   She  reports history of mild proliferative diabetic retinopathy.    She denies laser eye surgery.   Cataract surgery both eyes were done this year   Last eye exam: Dr. Larose in July appt of 2023  Opthlmology appt is on January 10th     Latest Reference Range & Units 01/23/23 08:48   Fructosamine 205 - 285 umol/L 259      Latest Reference Range & Units 11/12/19 12:47   C-Peptide 0.8 - 3.5 ng/mL 15.2 (H)      Latest Reference Range & Units 11/12/19 12:47   MAN Antibody 0.0 - 5.0 IU/mL <5.0     2.  Neuropathy:   Denies numbness or tingling to her feet but reports pain occasionally she is currently taking Lyrica.  she denies history of foot sores.     3.  Kidney transplant status:  Surgery in [facility] in 10/31/22   Currently taking Losartan  Dr. Blackmon nephrology     Latest Reference Range & Units 09/14/23 07:07   Micro Alb Creat Ratio 0 - 30 mg/g 73 (H)   Creatinine, Urine mg/dL 85.11   Microalbumin, Urine Random mg/dL 6.2      Latest Reference Range & Units 09/14/23 07:06   Bun 8 - 22 mg/dL " 35 (H)   Creatinine 0.50 - 1.40 mg/dL 1.93 (H)   GFR (CKD-EPI) >60 mL/min/1.73 m 2 27 !       4.  Cardiovascular disease:  Paroxysmal Atrial Fibrillation-She is taking Eliquis.   She is seeing at [facility]-Aster Santamaria     5.  Hypothyroidism, acquired:   She is currently levothyroixine 75 mcg of levothyroxine daily   She takes it every morning on an empty stomach  Denies taking any iron, calcium, multivitamins, antiacids with the medication    Denies fatigue, constipation, dry skin, palpitations.      Latest Reference Range & Units 09/05/23 07:48   TSH 0.380 - 5.330 uIU/mL 2.780     6. Essential Hypertension:  FV by cardiology   Currently taking Norvasc 10 mg, carvedilol 25 mg, lisinopril 40 mg  BP in office today was 134/52   denies any dizziness, palpitations, chest pain    7.  Dyslipidemia:  She is currently taking statin-atorvastatin 20 mg daily   She still complaining of muscle aches especially in her lower legs   Latest Reference Range & Units 02/18/23 04:17   Cholesterol,Tot 100 - 199 mg/dL 97 (L)   Triglycerides 0 - 149 mg/dL 144   HDL >=40 mg/dL 31 !   LDL <100 mg/dL 37       8. Vitamin D deficiency:  Currently not taking any vitamin D  Fv by Dr. Blackmon    Latest Reference Range & Units 09/14/23 07:06   25-Hydroxy   Vitamin D 25 30 - 100 ng/mL 14 (L)      Latest Reference Range & Units 09/14/23 07:06   Pth, Intact 14.0 - 72.0 pg/mL 103.0 (H)     ROS:     CONS:     No fever, no chills, no weight loss, no fatigue   EYES:      No diplopia, no blurry vision, no redness of eyes, no swelling of eyelids   ENT:    No hearing loss, No ear pain, No sore throat, no dysphagia, no neck swelling   CV:     No chest pain, no palpitations, no claudication, no orthopnea, no PND   PULM:    No SOB, no cough, no hemoptysis, no wheezing    GI:   No nausea, no vomiting, no diarrhea, no constipation, no bloody stools   :  Passing urine well, no dysuria, no hematuria   ENDO:   No  polyuria, no polydipsia, no heat intolerance, no cold intolerance   NEURO: No headaches, no dizziness, no convulsions, no tremors   MUSC:  No joint swellings, no arthralgias, no myalgias, no weakness   SKIN:   No rash, no ulcers, no dry skin   PSYCH:   No depression, no anxiety, no difficulty sleeping       Past Medical History:  Patient Active Problem List    Diagnosis Date Noted    Dysuria 09/08/2023    Low bicarbonate level 09/05/2023    Atrial fibrillation with RVR ([facility]) 09/04/2023    Itching 06/08/2023    Acute kidney injury superimposed on chronic kidney disease ([facility]) 05/19/2023    Acute renal failure superimposed on chronic kidney disease, unspecified CKD stage, unspecified acute renal failure type ([facility]) 05/17/2023    Other hydronephrosis 05/17/2023    Long term current use of immunosuppressive drug 04/25/2023    Leg swelling 04/20/2023    Anemia due to stage 3b chronic kidney disease ([facility]) 04/02/2023    Permanent atrial fibrillation ([facility]) 04/01/2023    Multifocal pneumonia 03/31/2023    Stage 3a chronic kidney disease ([facility]) 03/31/2023    Drug-induced constipation 03/09/2023    Chronic anticoagulation 12/27/2022    Type 2 diabetes mellitus ([facility]) 12/19/2022    Long-term insulin use ([facility]) 12/08/2022    Kidney transplant recipient 11/09/2022    Lung nodules 10/22/2022    GERD (gastroesophageal reflux disease) 10/19/2022    Normocytic anemia 08/25/2022    Secondary hypercoagulable state ([facility]) 04/04/2022    Chronic diastolic CHF (congestive heart failure) ([facility]) 01/25/2022    Paroxysmal A-fib ([facility]) 08/11/2021    Pancytopenia ([facility]) 11/18/2020    Presence of drug-eluting stent in right coronary artery     Chronic heart failure with preserved ejection fraction ([facility]) 05/04/2020    ESRD s/p kidney transplant 10/31/22 05/04/2020    Acquired hypothyroidism 05/04/2020    Dental disorder 05/04/2020    Coronary artery disease with angina pectoris with documented spasm ([facility])     Mixed hyperlipidemia 11/12/2019    Elevated  troponin 2018    RLS (restless legs syndrome) 2016    Diabetes (HCC) 2016    Renovascular hypertension 2013       Past Surgical History:  Past Surgical History:   Procedure Laterality Date    URETERAL REIMPLANTATION  2023    transplant ureter reimplantation - [facility]    OTHER ABDOMINAL SURGERY Right 10/31/2022     Donor kidney transplant - [facility] Shonna Constantino    Z CARDIAC CATH  2016    RCA stented with 2 Synergy drug-eluting stents.    RECOVERY  2016    Procedure: CATH LAB [facility] WITH POSSIBLE DR. CASTILLO;  Surgeon: Recoveryonly Surgery;  [place] [facility] [place]:     ZZZ CARDIAC CATH  2016    100% RCA    OTHER Left     left arm upper extremity fistula    OTHER ABDOMINAL SURGERY      left kidney removed due to cancer        Allergies:  Patient has no known allergies.     Current Medications:    Current Outpatient Medications:     amLODIPine (NORVASC) 5 MG Tab, Take 1 Tablet by mouth every day., Disp: 90 Tablet, Rfl: 3    Blood Glucose Meter Kit, Test blood sugar as recommended by provider. Covered blood glucose monitoring kit., Disp: 1 Kit, Rfl: 0    Blood Glucose Test Strips, Use one Covered strip to test blood sugar three times daily., Disp: 100 Strip, Rfl: 3    Lancets, Use one covered lancet to test blood sugar three times daily., Disp: 100 Each, Rfl: 3    Alcohol Swabs, Wipe site with prep pad prior to injection., Disp: 100 Each, Rfl: 3    pravastatin (PRAVACHOL) 40 MG tablet, Take 1 Tablet by mouth every evening., Disp: 30 Tablet, Rfl: 11    sodium bicarbonate (SODIUM BICARBONATE) 650 MG Tab, Take 2 Tablets by mouth 2 times a day., Disp: 120 Tablet, Rfl: 3    tacrolimus (PROGRAF) 1 MG Cap, Take 2 Capsules by mouth every morning., Disp: 60 Capsule, Rfl: 3    amiodarone (CORDARONE) 200 MG Tab, Take 1 Tablet by mouth every day for 30 days., Disp: 30 Tablet, Rfl: 0    hydrOXYzine HCl (ATARAX) 25 MG Tab, TOME FERNANDO TABLETA  DOS VECES AL DESMOND CUANDO SEA NECESARIO PARA LA PICAZON, Disp: 30 Tablet, Rfl: 5    apixaban (ELIQUIS) 2.5mg Tab, Take 1 Tablet by mouth 2 times a day., Disp: 60 Tablet, Rfl: 11    furosemide (LASIX) 40 MG Tab, Take 1 Tablet by mouth every day., Disp: 90 Tablet, Rfl: 3    mycophenolate (CELLCEPT) 250 MG Cap, Take 1 Capsule by mouth 2 times a day., Disp: 20 Capsule, Rfl: 0    levothyroxine (SYNTHROID) 75 MCG Tab, Take 1 Tablet by mouth every morning on an empty stomach., Disp: 90 Tablet, Rfl: 4    insulin glargine (LANTUS SOLOSTAR) 100 UNIT/ML Solution Pen-injector injection, Inject 4 Units under the skin every day., Disp: , Rfl:     losartan (COZAAR) 100 MG Tab, Take 1 Tablet by mouth every evening., Disp: 90 Tablet, Rfl: 3    glucose blood (ONETOUCH VERIO) strip, 1 Strip by Other route as needed (on insulin checking 3-4 times a day)., Disp: 150 Strip, Rfl: 6    atorvastatin (LIPITOR) 20 MG Tab, Take 1 Tablet by mouth every evening., Disp: 100 Tablet, Rfl: 3    Lancets, Use one Freestyle Pearl lancet to test blood sugar once daily ., Disp: 300 Each, Rfl: 3    insulin aspart (NOVOLOG FLEXPEN) 100 UNIT/ML injection PEN, Inject 4 Units under the skin 3 times a day as needed for High Blood Sugar. If BS>200=Pt takes 4 units of Insulin * pt tests before meals*, Disp: , Rfl:     predniSONE (DELTASONE) 5 MG Tab, Take 10 mg by mouth every day., Disp: , Rfl:     Social History:  Social History     Socioeconomic History    Marital status:      Spouse name: Not on file    Number of children: Not on file    Years of education: Not on file    Highest education level: Not on file   Occupational History    Not on file   Tobacco Use    Smoking status: Never    Smokeless tobacco: Never   Vaping Use    Vaping Use: Never used   Substance and Sexual Activity    Alcohol use: No     Alcohol/week: 0.0 oz    Drug use: No    Sexual activity: Never   Other Topics Concern    Not on file   Social History Narrative    Not on file     Social  "Determinants of Health     Financial Resource Strain: Not on file   Food Insecurity: Not on file   Transportation Needs: Not on file   Physical Activity: Not on file   Stress: Not on file   Social Connections: Not on file   Intimate Partner Violence: Not on file   Housing Stability: Not on file        Family History:   Family History   Problem Relation Age of Onset    Diabetes Sister     Other Sister         liver disease    Diabetes Brother     Heart Disease Neg Hx        PHYSICAL EXAM:   Vital signs: /52 (BP Location: Right arm, Patient Position: Sitting)   Pulse (!) 54   Ht 1.499 m (4' 11\")   Wt 55.8 kg (123 lb)   LMP  (LMP Unknown)   SpO2 98%   BMI 24.84 kg/m²   GENERAL: Well-developed, well-nourished  in no apparent distress.   FOOT: Normal sensation to monofilament testing, normal pulses, no ulcers.  Normal Vibration quantitative sensation test.    Labs:  Lab Results   Component Value Date/Time    HBA1C 5.7 (A) 12/08/2021 1520    AVGLUC 111 04/28/2021 0937     Lab Results   Component Value Date/Time    CHOLSTRLTOT 125 09/29/2020 1056    TRIGLYCERIDE 113 09/29/2020 1056    HDL 48 09/29/2020 1056    LDL 54 09/29/2020 1056       Lab Results   Component Value Date/Time    MALBCRT 73 (H) 09/14/2023 07:07 AM    MICROALBUR 6.2 09/14/2023 07:07 AM        Lab Results   Component Value Date/Time    TSHULTRASEN 4.150 08/12/2021 0201     Lab Results   Component Value Date/Time    FREEDIR 1.52 01/28/2021 0700         ASSESSMENT/PLAN:   1. Controlled type 2 diabetes mellitus with hyperglycemia, with long-term current use of insulin (HCC)  Stable with an A1c of 6.5%  Lifestyle modifications discussed    Diabetes management: No changes  Lantus 5 units-  Novolg 4 units if BG >200 2 units before each meal plus a sliding scale  151-200  1 unit  200-250 2 units  2  3 units  301-350 4 units  351-400 5 units  401-450 6 units    - Continuous Blood Gluc Sensor (DEXCOM G7 SENSOR) Misc; 1 Units every 10 days.  " Dispense: 9 Each; Refill: 11    2. Neuropathy  Stable at this time  Continue appointment-HPI    3. Kidney transplant status  Unstable  Following nephrology    4. Cardiovascular disease  Stable  Follow-up cardiology    5. Hypothyroidism, acquired  - Clinically stable  - Biochemically stable  - Continue regimen-HPI   - TSH; Future  - FREE THYROXINE; Future  - Comp Metabolic Panel; Future    6. Essential hypertension  Stable  Continue regimen-HPI    7. Dyslipidemia  Unstable  Continue regimen-HPI  - Lipid Profile; Future    8. Vitamin D deficiency  Unstable  Following nephrology    Disposition: Follow-up in 6 months  Do blood work 2 week prior to next appointment with me    Thank you kindly for allowing me to participate in the diabetes care plan for this patient.    ANGELITA Guzmán  09/20/23        CC:   ANGELITA Concepcion

## 2023-09-20 NOTE — LETTER
[facility]  ANGELITA Concepcion  [address]  [place] [address]  Fax: [phone]   Authorization for Release/Disclosure of   Protected Health Information   Name: DIOR GALLEGOS : 1950 SSN: [id number]   Address: [facility] [address]  [place] [address] Phone:    [phone] (home) [phone] (work)   I authorize the entity listed below to release/disclose the PHI below to:   [facility]/ANGELITA Concepcion and ANGELITA Guzmán   Provider or Entity Name:    Dr. Larose   Address   City, [facility]   Phone:      Fax:     Reason for request: continuity of care   Information to be released:    Retinal exam   [  ] Check here and initial the line next to each item to release ALL health information INCLUDING  _____ Care and treatment for drug and / or alcohol abuse  _____ HIV testing, infection status, or AIDS  _____ Genetic Testing    DATES OF SERVICE OR TIME PERIOD TO BE DISCLOSED: _____________  I understand and acknowledge that:  * This Authorization may be revoked at any time by you in writing, except if your health information has already been used or disclosed.  * Your health information that will be used or disclosed as a result of you signing this authorization could be re-disclosed by the recipient. If this occurs, your re-disclosed health information may no longer be protected by State or Federal laws.  * You may refuse to sign this Authorization. Your refusal will not affect your ability to obtain treatment.  * This Authorization becomes effective upon signing and will  on (date) __________.      If no date is indicated, this Authorization will  one (1) year from the signature date.    Name: Dior Gallegos  Signature:  continued medical care Date:   2023     PLEASE FAX REQUESTED RECORDS BACK TO: [phone]

## 2023-09-20 NOTE — PROGRESS NOTES
RN-CDE Note    Subjective:   Endocrinology Clinic Progress Note  PCP: ANGELITA Concepcion  HPI:  Tere Gallegos is a 72 y.o. old patient who is seen today by the Diabetes Nurse Specialist for review of his controlled type 2 diabetes with long term use of insulin.    Recent changes in health: denies changes  DM:   Last A1c:   Lab Results   Component Value Date/Time    HBA1C 6.5 (H) 08/07/2023 09:55 PM    HBA1C 6.0 (H) 02/18/2023 04:17 AM        Diabetes Medications:   Lantus 5 untis per day  Novolog 3-4      Patient's body mass index is 24.84 kg/m². Exercise and nutrition counseling were performed at this visit.    Glucose monitoring frequency: testing blood sugars 3-4 times per day.  Did not bring logs    Hypoglycemic episodes: no  Last Retinal Exam:  was scheduled for appointment with Dr. Larose in July, request for records sent     Foot Exam:  Monofilament: current.   Lab Results   Component Value Date/Time    MALBCRT 73 (H) 09/14/2023 07:07 AM    MICROALBUR 6.2 09/14/2023 07:07 AM        ACR Albumin/Creatinine Ratio goal <30     HTN:   Blood pressure goal <130/<80 .   Currently Rx ACE/ARB: Yes     Dyslipidemia:    Lab Results   Component Value Date/Time    CHOLSTRLTOT 97 (L) 02/18/2023 04:17 AM    LDL 37 02/18/2023 04:17 AM    HDL 31 (A) 02/18/2023 04:17 AM    TRIGLYCERIDE 144 02/18/2023 04:17 AM         Currently Rx Statin: Yes     She  reports that she has never smoked. She has never used smokeless tobacco.      Plan:     Discussed and educated on:   - All medications, side effects and compliance (discussed carefully)  - Home glucose monitoring emphasized  - Weight control and daily exercise    Recommended medication changes: none

## 2023-09-21 ENCOUNTER — HOSPITAL ENCOUNTER (OUTPATIENT)
Dept: LAB | Facility: MEDICAL CENTER | Age: 73
End: 2023-09-21
Attending: INTERNAL MEDICINE
Payer: MEDICARE

## 2023-09-21 ENCOUNTER — OFFICE VISIT (OUTPATIENT)
Dept: MEDICAL GROUP | Facility: MEDICAL CENTER | Age: 73
End: 2023-09-21
Payer: MEDICARE

## 2023-09-21 DIAGNOSIS — N39.0 UTI DUE TO EXTENDED-SPECTRUM BETA LACTAMASE (ESBL) PRODUCING ESCHERICHIA COLI: ICD-10-CM

## 2023-09-21 DIAGNOSIS — Z16.12 UTI DUE TO EXTENDED-SPECTRUM BETA LACTAMASE (ESBL) PRODUCING ESCHERICHIA COLI: ICD-10-CM

## 2023-09-21 DIAGNOSIS — B96.29 UTI DUE TO EXTENDED-SPECTRUM BETA LACTAMASE (ESBL) PRODUCING ESCHERICHIA COLI: ICD-10-CM

## 2023-09-21 LAB
ANION GAP SERPL CALC-SCNC: 13 MMOL/L (ref 7–16)
APPEARANCE UR: NORMAL
BASOPHILS # BLD AUTO: 0.2 % (ref 0–1.8)
BASOPHILS # BLD: 0.01 K/UL (ref 0–0.12)
BILIRUB UR STRIP-MCNC: NORMAL MG/DL
BUN SERPL-MCNC: 38 MG/DL (ref 8–22)
CALCIUM SERPL-MCNC: 10.1 MG/DL (ref 8.4–10.2)
CHLORIDE SERPL-SCNC: 109 MMOL/L (ref 96–112)
CO2 SERPL-SCNC: 13 MMOL/L (ref 20–33)
COLOR UR AUTO: NORMAL
CREAT SERPL-MCNC: 1.79 MG/DL (ref 0.5–1.4)
EOSINOPHIL # BLD AUTO: 0.03 K/UL (ref 0–0.51)
EOSINOPHIL NFR BLD: 0.7 % (ref 0–6.9)
ERYTHROCYTE [DISTWIDTH] IN BLOOD BY AUTOMATED COUNT: 51 FL (ref 35.9–50)
GFR SERPLBLD CREATININE-BSD FMLA CKD-EPI: 29 ML/MIN/1.73 M 2
GLUCOSE SERPL-MCNC: 101 MG/DL (ref 65–99)
GLUCOSE UR STRIP.AUTO-MCNC: NORMAL MG/DL
HCT VFR BLD AUTO: 37.4 % (ref 37–47)
HGB BLD-MCNC: 11.3 G/DL (ref 12–16)
IMM GRANULOCYTES # BLD AUTO: 0.01 K/UL (ref 0–0.11)
IMM GRANULOCYTES NFR BLD AUTO: 0.2 % (ref 0–0.9)
KETONES UR STRIP.AUTO-MCNC: NORMAL MG/DL
LEUKOCYTE ESTERASE UR QL STRIP.AUTO: NORMAL
LYMPHOCYTES # BLD AUTO: 0.74 K/UL (ref 1–4.8)
LYMPHOCYTES NFR BLD: 18.4 % (ref 22–41)
MCH RBC QN AUTO: 27.9 PG (ref 27–33)
MCHC RBC AUTO-ENTMCNC: 30.2 G/DL (ref 32.2–35.5)
MCV RBC AUTO: 92.3 FL (ref 81.4–97.8)
MONOCYTES # BLD AUTO: 0.35 K/UL (ref 0–0.85)
MONOCYTES NFR BLD AUTO: 8.7 % (ref 0–13.4)
NEUTROPHILS # BLD AUTO: 2.88 K/UL (ref 1.82–7.42)
NEUTROPHILS NFR BLD: 71.8 % (ref 44–72)
NITRITE UR QL STRIP.AUTO: NORMAL
NRBC # BLD AUTO: 0 K/UL
NRBC BLD-RTO: 0 /100 WBC (ref 0–0.2)
PH UR STRIP.AUTO: 5 [PH] (ref 5–8)
PHOSPHATE SERPL-MCNC: 2.8 MG/DL (ref 2.5–4.5)
PLATELET # BLD AUTO: 125 K/UL (ref 164–446)
PMV BLD AUTO: 11 FL (ref 9–12.9)
POTASSIUM SERPL-SCNC: 6.2 MMOL/L (ref 3.6–5.5)
PROT UR QL STRIP: 300 MG/DL
RBC # BLD AUTO: 4.05 M/UL (ref 4.2–5.4)
RBC UR QL AUTO: NORMAL
SODIUM SERPL-SCNC: 135 MMOL/L (ref 135–145)
SP GR UR STRIP.AUTO: 1.03
UROBILINOGEN UR STRIP-MCNC: 0.2 MG/DL
WBC # BLD AUTO: 4 K/UL (ref 4.8–10.8)

## 2023-09-21 PROCEDURE — 80048 BASIC METABOLIC PNL TOTAL CA: CPT

## 2023-09-21 PROCEDURE — 99215 OFFICE O/P EST HI 40 MIN: CPT | Performed by: NURSE PRACTITIONER

## 2023-09-21 PROCEDURE — 85025 COMPLETE CBC W/AUTO DIFF WBC: CPT

## 2023-09-21 PROCEDURE — 36415 COLL VENOUS BLD VENIPUNCTURE: CPT

## 2023-09-21 PROCEDURE — 81002 URINALYSIS NONAUTO W/O SCOPE: CPT | Performed by: NURSE PRACTITIONER

## 2023-09-21 PROCEDURE — 84100 ASSAY OF PHOSPHORUS: CPT

## 2023-09-21 PROCEDURE — 80197 ASSAY OF TACROLIMUS: CPT

## 2023-09-21 RX ORDER — BLOOD SUGAR DIAGNOSTIC
STRIP MISCELLANEOUS
Qty: 100 STRIP | Refills: 3 | Status: SHIPPED | OUTPATIENT
Start: 2023-09-21 | End: 2023-09-22

## 2023-09-21 NOTE — ASSESSMENT & PLAN NOTE
Patient was diagnosed with UTI 2 weeks ago here in office. Prescribed Bactrim BID for 7 days, she reports completing prescription. Culture later came back as ESBL which she has a history of, sensitive to bactrim.    Had repeat UA done by her neprhologist 1 week ago, continued to show ESBL growth.     She has an appointment with her nephrologist tomorrow

## 2023-09-22 ENCOUNTER — APPOINTMENT (OUTPATIENT)
Dept: RADIOLOGY | Facility: MEDICAL CENTER | Age: 73
DRG: 690 | End: 2023-09-22
Attending: EMERGENCY MEDICINE
Payer: MEDICARE

## 2023-09-22 ENCOUNTER — OFFICE VISIT (OUTPATIENT)
Dept: NEPHROLOGY | Facility: MEDICAL CENTER | Age: 73
End: 2023-09-22
Payer: MEDICARE

## 2023-09-22 ENCOUNTER — HOSPITAL ENCOUNTER (INPATIENT)
Facility: MEDICAL CENTER | Age: 73
LOS: 2 days | DRG: 690 | End: 2023-09-25
Attending: EMERGENCY MEDICINE | Admitting: STUDENT IN AN ORGANIZED HEALTH CARE EDUCATION/TRAINING PROGRAM
Payer: MEDICARE

## 2023-09-22 VITALS
SYSTOLIC BLOOD PRESSURE: 144 MMHG | DIASTOLIC BLOOD PRESSURE: 84 MMHG | HEART RATE: 55 BPM | WEIGHT: 123 LBS | TEMPERATURE: 97.1 F | RESPIRATION RATE: 18 BRPM | OXYGEN SATURATION: 98 % | BODY MASS INDEX: 23.22 KG/M2 | HEIGHT: 61 IN

## 2023-09-22 DIAGNOSIS — Z79.4 TYPE 2 DIABETES MELLITUS WITH OTHER SPECIFIED COMPLICATION, WITH LONG-TERM CURRENT USE OF INSULIN (HCC): ICD-10-CM

## 2023-09-22 DIAGNOSIS — N18.6 ESRD (END STAGE RENAL DISEASE) (HCC): Chronic | ICD-10-CM

## 2023-09-22 DIAGNOSIS — Z94.0 KIDNEY TRANSPLANT RECIPIENT: Chronic | ICD-10-CM

## 2023-09-22 DIAGNOSIS — N39.0 UTI DUE TO EXTENDED-SPECTRUM BETA LACTAMASE (ESBL) PRODUCING ESCHERICHIA COLI: ICD-10-CM

## 2023-09-22 DIAGNOSIS — I15.0 RENOVASCULAR HYPERTENSION: ICD-10-CM

## 2023-09-22 DIAGNOSIS — N39.0 ACUTE UTI: ICD-10-CM

## 2023-09-22 DIAGNOSIS — I48.21 PERMANENT ATRIAL FIBRILLATION (HCC): Chronic | ICD-10-CM

## 2023-09-22 DIAGNOSIS — Z16.12 UTI DUE TO EXTENDED-SPECTRUM BETA LACTAMASE (ESBL) PRODUCING ESCHERICHIA COLI: ICD-10-CM

## 2023-09-22 DIAGNOSIS — N18.32 STAGE 3B CHRONIC KIDNEY DISEASE: ICD-10-CM

## 2023-09-22 DIAGNOSIS — B96.29 UTI DUE TO EXTENDED-SPECTRUM BETA LACTAMASE (ESBL) PRODUCING ESCHERICHIA COLI: ICD-10-CM

## 2023-09-22 DIAGNOSIS — E11.69 TYPE 2 DIABETES MELLITUS WITH OTHER SPECIFIED COMPLICATION, WITH LONG-TERM CURRENT USE OF INSULIN (HCC): ICD-10-CM

## 2023-09-22 DIAGNOSIS — N17.9 AKI (ACUTE KIDNEY INJURY) (HCC): ICD-10-CM

## 2023-09-22 DIAGNOSIS — D61.818 PANCYTOPENIA (HCC): Chronic | ICD-10-CM

## 2023-09-22 PROBLEM — N18.9 ACUTE KIDNEY INJURY SUPERIMPOSED ON CHRONIC KIDNEY DISEASE (HCC): Status: RESOLVED | Noted: 2023-05-19 | Resolved: 2023-09-22

## 2023-09-22 PROBLEM — D63.1 ANEMIA DUE TO STAGE 3B CHRONIC KIDNEY DISEASE: Status: RESOLVED | Noted: 2023-04-02 | Resolved: 2023-09-22

## 2023-09-22 LAB
ALBUMIN SERPL BCP-MCNC: 4.4 G/DL (ref 3.2–4.9)
ALBUMIN/GLOB SERPL: 1.5 G/DL
ALP SERPL-CCNC: 82 U/L (ref 30–99)
ALT SERPL-CCNC: 18 U/L (ref 2–50)
ANION GAP SERPL CALC-SCNC: 12 MMOL/L (ref 7–16)
APPEARANCE UR: CLEAR
APPEARANCE UR: CLEAR
AST SERPL-CCNC: 21 U/L (ref 12–45)
BACTERIA #/AREA URNS HPF: NEGATIVE /HPF
BACTERIA #/AREA URNS HPF: NEGATIVE /HPF
BASOPHILS # BLD AUTO: 0.2 % (ref 0–1.8)
BASOPHILS # BLD: 0.01 K/UL (ref 0–0.12)
BILIRUB SERPL-MCNC: 0.4 MG/DL (ref 0.1–1.5)
BILIRUB UR QL STRIP.AUTO: NEGATIVE
BILIRUB UR QL STRIP.AUTO: NEGATIVE
BUN SERPL-MCNC: 34 MG/DL (ref 8–22)
CALCIUM ALBUM COR SERPL-MCNC: 9.8 MG/DL (ref 8.5–10.5)
CALCIUM SERPL-MCNC: 10.1 MG/DL (ref 8.5–10.5)
CHLORIDE SERPL-SCNC: 108 MMOL/L (ref 96–112)
CO2 SERPL-SCNC: 16 MMOL/L (ref 20–33)
COLOR UR: YELLOW
COLOR UR: YELLOW
CREAT SERPL-MCNC: 1.7 MG/DL (ref 0.5–1.4)
EOSINOPHIL # BLD AUTO: 0.01 K/UL (ref 0–0.51)
EOSINOPHIL NFR BLD: 0.2 % (ref 0–6.9)
EPI CELLS #/AREA URNS HPF: NEGATIVE /HPF
EPI CELLS #/AREA URNS HPF: NEGATIVE /HPF
ERYTHROCYTE [DISTWIDTH] IN BLOOD BY AUTOMATED COUNT: 48.7 FL (ref 35.9–50)
GFR SERPLBLD CREATININE-BSD FMLA CKD-EPI: 31 ML/MIN/1.73 M 2
GLOBULIN SER CALC-MCNC: 2.9 G/DL (ref 1.9–3.5)
GLUCOSE BLD STRIP.AUTO-MCNC: 182 MG/DL (ref 65–99)
GLUCOSE SERPL-MCNC: 124 MG/DL (ref 65–99)
GLUCOSE UR STRIP.AUTO-MCNC: 500 MG/DL
GLUCOSE UR STRIP.AUTO-MCNC: NEGATIVE MG/DL
HCT VFR BLD AUTO: 38.7 % (ref 37–47)
HGB BLD-MCNC: 12.2 G/DL (ref 12–16)
HYALINE CASTS #/AREA URNS LPF: ABNORMAL /LPF
HYALINE CASTS #/AREA URNS LPF: ABNORMAL /LPF
IMM GRANULOCYTES # BLD AUTO: 0.01 K/UL (ref 0–0.11)
IMM GRANULOCYTES NFR BLD AUTO: 0.2 % (ref 0–0.9)
KETONES UR STRIP.AUTO-MCNC: NEGATIVE MG/DL
KETONES UR STRIP.AUTO-MCNC: NEGATIVE MG/DL
LEUKOCYTE ESTERASE UR QL STRIP.AUTO: ABNORMAL
LEUKOCYTE ESTERASE UR QL STRIP.AUTO: ABNORMAL
LYMPHOCYTES # BLD AUTO: 0.45 K/UL (ref 1–4.8)
LYMPHOCYTES NFR BLD: 10.8 % (ref 22–41)
MCH RBC QN AUTO: 28.2 PG (ref 27–33)
MCHC RBC AUTO-ENTMCNC: 31.5 G/DL (ref 32.2–35.5)
MCV RBC AUTO: 89.4 FL (ref 81.4–97.8)
MICRO URNS: ABNORMAL
MICRO URNS: ABNORMAL
MONOCYTES # BLD AUTO: 0.3 K/UL (ref 0–0.85)
MONOCYTES NFR BLD AUTO: 7.2 % (ref 0–13.4)
NEUTROPHILS # BLD AUTO: 3.39 K/UL (ref 1.82–7.42)
NEUTROPHILS NFR BLD: 81.4 % (ref 44–72)
NITRITE UR QL STRIP.AUTO: NEGATIVE
NITRITE UR QL STRIP.AUTO: NEGATIVE
NRBC # BLD AUTO: 0 K/UL
NRBC BLD-RTO: 0 /100 WBC (ref 0–0.2)
PH UR STRIP.AUTO: 5 [PH] (ref 5–8)
PH UR STRIP.AUTO: 5 [PH] (ref 5–8)
PLATELET # BLD AUTO: 122 K/UL (ref 164–446)
PMV BLD AUTO: 10.6 FL (ref 9–12.9)
POTASSIUM SERPL-SCNC: 6.5 MMOL/L (ref 3.6–5.5)
PROT SERPL-MCNC: 7.3 G/DL (ref 6–8.2)
PROT UR QL STRIP: NEGATIVE MG/DL
PROT UR QL STRIP: NEGATIVE MG/DL
RBC # BLD AUTO: 4.33 M/UL (ref 4.2–5.4)
RBC # URNS HPF: ABNORMAL /HPF
RBC # URNS HPF: ABNORMAL /HPF
RBC UR QL AUTO: ABNORMAL
RBC UR QL AUTO: ABNORMAL
SODIUM SERPL-SCNC: 136 MMOL/L (ref 135–145)
SP GR UR STRIP.AUTO: 1.01
SP GR UR STRIP.AUTO: 1.01
TACROLIMUS BLD-MCNC: 5.4 NG/ML (ref 5–20)
UROBILINOGEN UR STRIP.AUTO-MCNC: 0.2 MG/DL
UROBILINOGEN UR STRIP.AUTO-MCNC: 0.2 MG/DL
WBC # BLD AUTO: 4.2 K/UL (ref 4.8–10.8)
WBC #/AREA URNS HPF: ABNORMAL /HPF
WBC #/AREA URNS HPF: ABNORMAL /HPF

## 2023-09-22 PROCEDURE — 36415 COLL VENOUS BLD VENIPUNCTURE: CPT

## 2023-09-22 PROCEDURE — 700117 HCHG RX CONTRAST REV CODE 255: Performed by: EMERGENCY MEDICINE

## 2023-09-22 PROCEDURE — 96365 THER/PROPH/DIAG IV INF INIT: CPT

## 2023-09-22 PROCEDURE — 700101 HCHG RX REV CODE 250: Mod: UD | Performed by: EMERGENCY MEDICINE

## 2023-09-22 PROCEDURE — 3079F DIAST BP 80-89 MM HG: CPT | Performed by: INTERNAL MEDICINE

## 2023-09-22 PROCEDURE — 700111 HCHG RX REV CODE 636 W/ 250 OVERRIDE (IP): Mod: UD | Performed by: EMERGENCY MEDICINE

## 2023-09-22 PROCEDURE — 83735 ASSAY OF MAGNESIUM: CPT

## 2023-09-22 PROCEDURE — 87086 URINE CULTURE/COLONY COUNT: CPT

## 2023-09-22 PROCEDURE — 74177 CT ABD & PELVIS W/CONTRAST: CPT

## 2023-09-22 PROCEDURE — 81001 URINALYSIS AUTO W/SCOPE: CPT

## 2023-09-22 PROCEDURE — 700102 HCHG RX REV CODE 250 W/ 637 OVERRIDE(OP): Mod: UD | Performed by: EMERGENCY MEDICINE

## 2023-09-22 PROCEDURE — 82962 GLUCOSE BLOOD TEST: CPT

## 2023-09-22 PROCEDURE — 700105 HCHG RX REV CODE 258: Mod: UD | Performed by: EMERGENCY MEDICINE

## 2023-09-22 PROCEDURE — 96375 TX/PRO/DX INJ NEW DRUG ADDON: CPT

## 2023-09-22 PROCEDURE — 3077F SYST BP >= 140 MM HG: CPT | Performed by: INTERNAL MEDICINE

## 2023-09-22 PROCEDURE — 80053 COMPREHEN METABOLIC PANEL: CPT

## 2023-09-22 PROCEDURE — 99215 OFFICE O/P EST HI 40 MIN: CPT | Performed by: INTERNAL MEDICINE

## 2023-09-22 PROCEDURE — 85025 COMPLETE CBC W/AUTO DIFF WBC: CPT

## 2023-09-22 PROCEDURE — 99285 EMERGENCY DEPT VISIT HI MDM: CPT

## 2023-09-22 PROCEDURE — 99223 1ST HOSP IP/OBS HIGH 75: CPT | Performed by: STUDENT IN AN ORGANIZED HEALTH CARE EDUCATION/TRAINING PROGRAM

## 2023-09-22 RX ORDER — POLYETHYLENE GLYCOL 3350 17 G/17G
1 POWDER, FOR SOLUTION ORAL
Status: DISCONTINUED | OUTPATIENT
Start: 2023-09-22 | End: 2023-09-25 | Stop reason: HOSPADM

## 2023-09-22 RX ORDER — PRAVASTATIN SODIUM 20 MG
40 TABLET ORAL EVERY EVENING
Status: DISCONTINUED | OUTPATIENT
Start: 2023-09-23 | End: 2023-09-23

## 2023-09-22 RX ORDER — AMLODIPINE BESYLATE 5 MG/1
5 TABLET ORAL DAILY
Status: DISCONTINUED | OUTPATIENT
Start: 2023-09-23 | End: 2023-09-25 | Stop reason: HOSPADM

## 2023-09-22 RX ORDER — CIPROFLOXACIN 500 MG/1
500 TABLET, FILM COATED ORAL DAILY
Qty: 7 TABLET | Refills: 0 | Status: ON HOLD | OUTPATIENT
Start: 2023-09-22 | End: 2023-09-25

## 2023-09-22 RX ORDER — PREDNISONE 5 MG/1
5 TABLET ORAL DAILY
Status: DISCONTINUED | OUTPATIENT
Start: 2023-09-23 | End: 2023-09-25 | Stop reason: HOSPADM

## 2023-09-22 RX ORDER — SODIUM CHLORIDE 9 MG/ML
INJECTION, SOLUTION INTRAVENOUS CONTINUOUS
Status: ACTIVE | OUTPATIENT
Start: 2023-09-23 | End: 2023-09-23

## 2023-09-22 RX ORDER — TACROLIMUS 1 MG/1
2 CAPSULE ORAL 2 TIMES DAILY
Status: DISCONTINUED | OUTPATIENT
Start: 2023-09-23 | End: 2023-09-25 | Stop reason: HOSPADM

## 2023-09-22 RX ORDER — DEXTROSE MONOHYDRATE 25 G/50ML
25 INJECTION, SOLUTION INTRAVENOUS ONCE
Status: COMPLETED | OUTPATIENT
Start: 2023-09-22 | End: 2023-09-22

## 2023-09-22 RX ORDER — SODIUM BICARBONATE 650 MG/1
1300 TABLET ORAL 2 TIMES DAILY
Status: DISCONTINUED | OUTPATIENT
Start: 2023-09-23 | End: 2023-09-24

## 2023-09-22 RX ORDER — ACETAMINOPHEN 325 MG/1
650 TABLET ORAL EVERY 6 HOURS PRN
Status: DISCONTINUED | OUTPATIENT
Start: 2023-09-22 | End: 2023-09-25 | Stop reason: HOSPADM

## 2023-09-22 RX ORDER — FUROSEMIDE 10 MG/ML
80 INJECTION INTRAMUSCULAR; INTRAVENOUS
Status: DISCONTINUED | OUTPATIENT
Start: 2023-09-23 | End: 2023-09-22

## 2023-09-22 RX ORDER — CALCIUM GLUCONATE 20 MG/ML
2 INJECTION, SOLUTION INTRAVENOUS ONCE
Status: COMPLETED | OUTPATIENT
Start: 2023-09-22 | End: 2023-09-22

## 2023-09-22 RX ORDER — BISACODYL 10 MG
10 SUPPOSITORY, RECTAL RECTAL
Status: DISCONTINUED | OUTPATIENT
Start: 2023-09-22 | End: 2023-09-25 | Stop reason: HOSPADM

## 2023-09-22 RX ORDER — DEXTROSE MONOHYDRATE 25 G/50ML
25 INJECTION, SOLUTION INTRAVENOUS
Status: DISCONTINUED | OUTPATIENT
Start: 2023-09-22 | End: 2023-09-25 | Stop reason: HOSPADM

## 2023-09-22 RX ORDER — SODIUM CHLORIDE 9 MG/ML
INJECTION, SOLUTION INTRAVENOUS CONTINUOUS
Status: DISCONTINUED | OUTPATIENT
Start: 2023-09-22 | End: 2023-09-22

## 2023-09-22 RX ORDER — FUROSEMIDE 10 MG/ML
80 INJECTION INTRAMUSCULAR; INTRAVENOUS ONCE
Status: COMPLETED | OUTPATIENT
Start: 2023-09-22 | End: 2023-09-22

## 2023-09-22 RX ORDER — FUROSEMIDE 80 MG
80 TABLET ORAL DAILY
Qty: 90 TABLET | Refills: 3 | Status: ON HOLD | OUTPATIENT
Start: 2023-09-22 | End: 2023-09-25

## 2023-09-22 RX ORDER — AMOXICILLIN 250 MG
2 CAPSULE ORAL 2 TIMES DAILY
Status: DISCONTINUED | OUTPATIENT
Start: 2023-09-23 | End: 2023-09-25 | Stop reason: HOSPADM

## 2023-09-22 RX ORDER — MYCOPHENOLATE MOFETIL 250 MG/1
250 CAPSULE ORAL 2 TIMES DAILY
Status: DISCONTINUED | OUTPATIENT
Start: 2023-09-23 | End: 2023-09-25 | Stop reason: HOSPADM

## 2023-09-22 RX ORDER — LEVOTHYROXINE SODIUM 0.07 MG/1
75 TABLET ORAL
Status: DISCONTINUED | OUTPATIENT
Start: 2023-09-23 | End: 2023-09-25 | Stop reason: HOSPADM

## 2023-09-22 RX ORDER — AMIODARONE HYDROCHLORIDE 200 MG/1
200 TABLET ORAL DAILY
Status: DISCONTINUED | OUTPATIENT
Start: 2023-09-23 | End: 2023-09-25 | Stop reason: HOSPADM

## 2023-09-22 RX ORDER — ATORVASTATIN CALCIUM 20 MG/1
20 TABLET, FILM COATED ORAL NIGHTLY
Status: DISCONTINUED | OUTPATIENT
Start: 2023-09-23 | End: 2023-09-25 | Stop reason: HOSPADM

## 2023-09-22 RX ADMIN — SODIUM CHLORIDE: 9 INJECTION, SOLUTION INTRAVENOUS at 20:46

## 2023-09-22 RX ADMIN — INSULIN HUMAN 3 UNITS: 100 INJECTION, SOLUTION PARENTERAL at 20:38

## 2023-09-22 RX ADMIN — FUROSEMIDE 80 MG: 10 INJECTION INTRAMUSCULAR; INTRAVENOUS at 20:48

## 2023-09-22 RX ADMIN — IOHEXOL 80 ML: 350 INJECTION, SOLUTION INTRAVENOUS at 23:00

## 2023-09-22 RX ADMIN — CALCIUM GLUCONATE 2 G: 20 INJECTION, SOLUTION INTRAVENOUS at 20:46

## 2023-09-22 RX ADMIN — DEXTROSE MONOHYDRATE 25 G: 25 INJECTION, SOLUTION INTRAVENOUS at 20:35

## 2023-09-22 ASSESSMENT — LIFESTYLE VARIABLES
TOTAL SCORE: 0
TOTAL SCORE: 0
EVER HAD A DRINK FIRST THING IN THE MORNING TO STEADY YOUR NERVES TO GET RID OF A HANGOVER: NO
CONSUMPTION TOTAL: NEGATIVE
DO YOU DRINK ALCOHOL: NO
HAVE YOU EVER FELT YOU SHOULD CUT DOWN ON YOUR DRINKING: NO
ON A TYPICAL DAY WHEN YOU DRINK ALCOHOL HOW MANY DRINKS DO YOU HAVE: 0
HOW MANY TIMES IN THE PAST YEAR HAVE YOU HAD 5 OR MORE DRINKS IN A DAY: 0
TOTAL SCORE: 0
AVERAGE NUMBER OF DAYS PER WEEK YOU HAVE A DRINK CONTAINING ALCOHOL: 0
EVER FELT BAD OR GUILTY ABOUT YOUR DRINKING: NO
HAVE PEOPLE ANNOYED YOU BY CRITICIZING YOUR DRINKING: NO

## 2023-09-22 ASSESSMENT — PAIN DESCRIPTION - PAIN TYPE: TYPE: ACUTE PAIN

## 2023-09-22 ASSESSMENT — ENCOUNTER SYMPTOMS
SHORTNESS OF BREATH: 0
CHILLS: 1
ABDOMINAL PAIN: 1
FEVER: 1

## 2023-09-22 ASSESSMENT — FIBROSIS 4 INDEX
FIB4 SCORE: 2.75
FIB4 SCORE: 2.75

## 2023-09-22 NOTE — PROGRESS NOTES
Subjective:   Tere Gallegos is a 72 y.o. female here today for dysuria    UTI due to extended-spectrum beta lactamase (ESBL) producing Escherichia coli  Patient was diagnosed with UTI 2 weeks ago here in office. Prescribed Bactrim BID for 7 days, she reports completing prescription. Culture later came back as ESBL which she has a history of, sensitive to bactrim.    Had repeat UA done by her neprhologist 1 week ago, continued to show ESBL growth.     She has an appointment with her nephrologist tomorrow       Current medicines (including changes today)  Current Outpatient Medications   Medication Sig Dispense Refill    glucose blood (ACCU-CHEK GUIDE) strip USE ONE COVERED STRIP TO TEST BLOOD SUGAR THREE TIMES DAILY. 100 Strip 3    Blood Glucose Meter Kit Test blood sugar as recommended by provider. Covered blood glucose monitoring kit. 1 Kit 0    amLODIPine (NORVASC) 5 MG Tab Take 1 Tablet by mouth every day. 90 Tablet 3    Lancets Use one covered lancet to test blood sugar three times daily. 100 Each 3    Alcohol Swabs Wipe site with prep pad prior to injection. 100 Each 3    pravastatin (PRAVACHOL) 40 MG tablet Take 1 Tablet by mouth every evening. 30 Tablet 11    sodium bicarbonate (SODIUM BICARBONATE) 650 MG Tab Take 2 Tablets by mouth 2 times a day. 120 Tablet 3    tacrolimus (PROGRAF) 1 MG Cap Take 2 Capsules by mouth every morning. 60 Capsule 3    amiodarone (CORDARONE) 200 MG Tab Take 1 Tablet by mouth every day for 30 days. 30 Tablet 0    hydrOXYzine HCl (ATARAX) 25 MG Tab TOME FERNANDO TABLETA DOS VECES AL DESMOND CUANDO SEA NECESARIO PARA LA PICAZON 30 Tablet 5    apixaban (ELIQUIS) 2.5mg Tab Take 1 Tablet by mouth 2 times a day. 60 Tablet 11    furosemide (LASIX) 40 MG Tab Take 1 Tablet by mouth every day. 90 Tablet 3    mycophenolate (CELLCEPT) 250 MG Cap Take 1 Capsule by mouth 2 times a day. 20 Capsule 0    levothyroxine (SYNTHROID) 75 MCG Tab Take 1 Tablet by mouth every morning on an empty  stomach. 90 Tablet 4    insulin glargine (LANTUS SOLOSTAR) 100 UNIT/ML Solution Pen-injector injection Inject 4 Units under the skin every day.      losartan (COZAAR) 100 MG Tab Take 1 Tablet by mouth every evening. 90 Tablet 3    glucose blood (ONETOUCH VERIO) strip 1 Strip by Other route as needed (on insulin checking 3-4 times a day). 150 Strip 6    atorvastatin (LIPITOR) 20 MG Tab Take 1 Tablet by mouth every evening. 100 Tablet 3    Lancets Use one Freestyle Pearl lancet to test blood sugar once daily . 300 Each 3    insulin aspart (NOVOLOG FLEXPEN) 100 UNIT/ML injection PEN Inject 4 Units under the skin 3 times a day as needed for High Blood Sugar. If BS>200=Pt takes 4 units of Insulin  * pt tests before meals*      predniSONE (DELTASONE) 5 MG Tab Take 10 mg by mouth every day.       No current facility-administered medications for this visit.     She  has a past medical history of Acquired hypothyroidism (05/04/2020), CAD (coronary artery disease), Chronic diastolic heart failure ([facility]) (05/04/2020), Coronary artery disease due to lipid rich plaque, Dental disorder, Diabetes ([facility]), ESRD (end stage renal disease) on dialysis ([facility]) (05/04/2020), Hemodialysis patient ([facility]), Hyperlipidemia, Hypertension, Kidney transplant candidate, Kidney transplant recipient (10/31/2022), Presence of drug-eluting stent in right coronary artery, QT prolongation (01/22/2020), RLS (restless legs syndrome) (08/05/2016), and Transaminitis (12/22/2018).    ROS   No chest pain, no shortness of breath, no abdominal pain  Positive ROS as per HPI.  All other systems reviewed and are negative.     Objective:     There were no vitals taken for this visit. There is no height or weight on file to calculate BMI.     Physical Exam:  Constitutional: Alert, no distress.  Skin: Warm, dry, good turgor, no rashes in visible areas.  Eye: Equal, round and reactive, conjunctiva clear, lids normal.  ENMT: Lips without lesions, good  dentition  Respiratory: Unlabored respiratory effort  Psych: Alert and oriented x3, normal affect and mood.  ABD: pelvic tenderness to palpation, no CVA tenderness      Assessment and Plan:   The following treatment plan was discussed    1. UTI due to extended-spectrum beta lactamase (ESBL) producing Escherichia coli  Unstable, symptomatic  UA positive for blood, leuks, neg nitrites  Discussed case with her nephrologist, Dr. Villar to determine best treatment with her history of renal transplant and CKD  Failed bactrim BID for 7 days, although CFU did decrease to 10-50,000  Will reach out to infectious disease specialist to discuss appropriate treatment option and update patient  - POCT Urinalysis      Total 40 minutes face-to-face time spent with patient, with greater than 50% of the total time discussing patient's issues and symptoms as listed above in assessment and plan, as well as managing coordination of care for future evaluation and treatment.      Followup: Return in about 4 weeks (around 10/19/2023).    The MA is performing the above orders under the direction of Dr. Priest    Please note that this dictation was created using voice recognition software. I have made every reasonable attempt to correct obvious errors, but I expect that there are errors of grammar and possibly content that I did not discover before finalizing the note.

## 2023-09-22 NOTE — PROGRESS NOTES
Chief Complaint   Patient presents with    Follow-Up     ESRD s/p kidney transplant 10/31/22, [facility], UTI SXS x 1 mo       CC: f/u CKD and transplant  She is with daughter-in-law, Edna.      services were used in the patient's primary language of English.   Name or Number: Kari [id number]  Mode of interpretation: iPad      HPI:  Tere Gallegos is a 72 y.o. female with a history of end-stage renal disease status post  donor kidney transplant 10/31/2022 at [facility] notable for delayed graft function requiring dialysis until mid 2022, also complicated by a distal ureteral stricture requiring percutaneous transplant nephrostomy followed by ureteral stenting in May 2023 and ureteral re-implantation on 23, diabetes, hypertension, permanent atrial fibrillation, pancytopenia, BK viremia who presents today for follow-up.    Patient was hospitalized in mid May 2023.  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  She required percutaneous nephrostomy on 2023.  She was then hospitalized at [facility] in [place] in late May, 2023. She had PCN removed and ureteral stent placed. Per transplant nephrology, plan is for repeat imaging in 4 to 6 weeks to see if ureteral stricture improves.  She had ureteral reimplantation done 2023.    Patient hospitalized in early 2023 with chest pain, atrial fibrillation with RVR, improved with medical management.    She complains of pain and burning in her lower abdomen. She says the pain is there all day and night and it's strong. It also burns when she urinates.  She took 7 days of bactrim, prescribed on 23. Finished on 9/15/23. She says the antibiotics didn't help at all, still has dysuria and pain in her lower abdomen.     Re: ESRD, patient was on dialysis since .  The etiology of ESRD was thought to be due to diabetes, hypertension, and solitary kidney.  Patient was anuric prior to kidney  transplant 10/31/2022.  Patient had delayed graft function and required dialysis until 2022.  Posttransplant course has been complicated by pancytopenia and BK viremia and BK viruria.   Denies taking NSAIDs. Complains of dysuria as above. She's been taking sodium bicarb 650mg bid, but I told her I want her to take 1300mg BID.     Re: immunosuppression, patient was induced with Simulect on 10/31/2022.  Patient was originally on mycophenolate 1000 mg p.o. twice daily, tacrolimus 1 mg p.o. twice daily, and prednisone 10 mg daily shortly after transplant.  Her dosages were weaned down. She is taking prednisone 5mg daily, Tacrolimus 2mg in morning and 2mg in evening, and MMF 250mg BID.     Re: hypertension, she denies LE edema currently. She's been checking BP at home and it's been 130-140's / 50-70's. She's on amlodipine 5mg daily, and losartan 100mg daily.       Past Medical History:   Diagnosis Date    Acquired hypothyroidism 2020    CAD (coronary artery disease)     Chronic diastolic heart failure ([facility]) 2020    Coronary artery disease due to lipid rich plaque     2 Synergy NOEMI to 100% RCA stent placed    Dental disorder     partial dentures- uppers    Diabetes ([facility])     oral medication    ESRD (end stage renal disease) on dialysis ([facility]) 2020    Hemodialysis patient ([facility])     M, W, F    Hyperlipidemia     Hypertension     Kidney transplant candidate     Kidney transplant recipient 10/31/2022    Presence of drug-eluting stent in right coronary artery     QT prolongation 2020    RLS (restless legs syndrome) 2016    Transaminitis 2018     Past Surgical History:   Procedure Laterality Date    URETERAL REIMPLANTATION  2023    transplant ureter reimplantation - [facility]    OTHER ABDOMINAL SURGERY Right 10/31/2022     Donor kidney transplant - [facility]    ZZZ CARDIAC CATH  2016    RCA stented with 2 Synergy drug-eluting stents.    RECOVERY  2016     Procedure: CATH LAB [facility] WITH POSSIBLE DR. CASTILLO;  Surgeon: Recovery[facility] Surgery;  Location: SURGERY PRE-POST [facility] [place] [facility];  Service:     ZZZ CARDIAC CATH  08/16/2016    100% RCA    OTHER Left 2014    left arm upper extremity fistula    OTHER ABDOMINAL SURGERY      left kidney removed due to cancer         Outpatient Encounter Medications as of 9/22/2023   Medication Sig Dispense Refill    ciprofloxacin (CIPRO) 500 MG Tab Take 1 Tablet by mouth every day for 7 days. 7 Tablet 0    Blood Glucose Meter Kit Test blood sugar as recommended by provider. Covered blood glucose monitoring kit. 1 Kit 0    amLODIPine (NORVASC) 5 MG Tab Take 1 Tablet by mouth every day. 90 Tablet 3    pravastatin (PRAVACHOL) 40 MG tablet Take 1 Tablet by mouth every evening. 30 Tablet 11    sodium bicarbonate (SODIUM BICARBONATE) 650 MG Tab Take 2 Tablets by mouth 2 times a day. 120 Tablet 3    tacrolimus (PROGRAF) 1 MG Cap Take 2 Capsules by mouth every morning. 60 Capsule 3    amiodarone (CORDARONE) 200 MG Tab Take 1 Tablet by mouth every day for 30 days. 30 Tablet 0    hydrOXYzine HCl (ATARAX) 25 MG Tab TOME FERNANDO TABLETA DOS VECES AL DESMOND CUANDO SEA NECESARIO PARA LA PICAZON 30 Tablet 5    apixaban (ELIQUIS) 2.5mg Tab Take 1 Tablet by mouth 2 times a day. 60 Tablet 11    furosemide (LASIX) 40 MG Tab Take 1 Tablet by mouth every day. 90 Tablet 3    mycophenolate (CELLCEPT) 250 MG Cap Take 1 Capsule by mouth 2 times a day. 20 Capsule 0    levothyroxine (SYNTHROID) 75 MCG Tab Take 1 Tablet by mouth every morning on an empty stomach. 90 Tablet 4    insulin glargine (LANTUS SOLOSTAR) 100 UNIT/ML Solution Pen-injector injection Inject 4 Units under the skin every day.      losartan (COZAAR) 100 MG Tab Take 1 Tablet by mouth every evening. 90 Tablet 3    atorvastatin (LIPITOR) 20 MG Tab Take 1 Tablet by mouth every evening. 100 Tablet 3    Lancets Use one Freestyle Pearl lancet to test blood sugar once daily . 300 Each 3    insulin aspart  "(NOVOLOG FLEXPEN) 100 UNIT/ML injection PEN Inject 4 Units under the skin 3 times a day as needed for High Blood Sugar. If BS>200=Pt takes 4 units of Insulin  * pt tests before meals*      predniSONE (DELTASONE) 5 MG Tab Take 10 mg by mouth every day.      [DISCONTINUED] glucose blood (ACCU-CHEK GUIDE) strip USE ONE COVERED STRIP TO TEST BLOOD SUGAR THREE TIMES DAILY. 100 Strip 3    [DISCONTINUED] Lancets Use one covered lancet to test blood sugar three times daily. 100 Each 3    [DISCONTINUED] Alcohol Swabs Wipe site with prep pad prior to injection. 100 Each 3    [DISCONTINUED] glucose blood (ONETOUCH VERIO) strip 1 Strip by Other route as needed (on insulin checking 3-4 times a day). 150 Strip 6     No facility-administered encounter medications on file as of 9/22/2023.        No Known Allergies    Review of Systems   Constitutional:  Positive for chills and fever.   Respiratory:  Negative for shortness of breath.    Cardiovascular:  Negative for chest pain.   Gastrointestinal:  Positive for abdominal pain (lower abdomen / pelvis).   Genitourinary:  Positive for dysuria.   All other systems reviewed and are negative.      BP (!) 144/84 (BP Location: Right arm, Patient Position: Sitting, BP Cuff Size: Adult)   Pulse (!) 55   Temp 36.2 °C (97.1 °F) (Temporal)   Resp 18   Ht 1.549 m (5' 1\")   Wt 55.8 kg (123 lb)   LMP  (LMP Unknown)   SpO2 98%   BMI 23.24 kg/m²     Physical Exam  Constitutional:       General: She is not in acute distress.  HENT:      Mouth/Throat:      Pharynx: No oropharyngeal exudate.   Eyes:      General: No scleral icterus.  Neck:      Trachea: No tracheal deviation.   Cardiovascular:      Rate and Rhythm: Bradycardia present. Rhythm irregular.      Heart sounds: Murmur heard.   Pulmonary:      Effort: Pulmonary effort is normal.      Breath sounds: Normal breath sounds. No stridor. No rales.   Abdominal:      General: Bowel sounds are normal.      Palpations: Abdomen is soft.      " Tenderness: There is abdominal tenderness (mostly in RLQ and suprapubic area). There is guarding (mild guarding).   Musculoskeletal:         General: Normal range of motion.      Cervical back: Neck supple.      Right lower leg: No edema.      Left lower leg: No edema.   Skin:     General: Skin is warm and dry.      Findings: No rash.   Neurological:      General: No focal deficit present.      Mental Status: She is alert and oriented to person, place, and time.   Psychiatric:         Mood and Affect: Mood and affect normal.         Behavior: Behavior normal.   Dialysis access: Left brachiobasilic AV fistula, with patent bruit and thrill.         Labs reviewed.  Recent Labs     12/19/22  0810 12/20/22  0430 01/23/23  0848 01/30/23  0853 02/18/23  0417 02/19/23  0059 08/29/23  0656 09/04/23  1636 09/05/23  0133 09/07/23  0702 09/14/23  0706 09/21/23  0702   ALBUMIN 4.4   < > 4.3   < > 3.6   < >  --  4.2  --  4.0 3.9  --    HDL 39*  --  45  --  31*  --   --   --   --   --   --   --    TRIGLYCERIDE 177*  --  109  --  144  --   --   --   --   --   --   --    SODIUM 140   < > 138   < > 139   < > 138 136   < > 139 135 135   POTASSIUM 4.4   < > 4.9   < > 3.4*   < > 5.3 5.5   < > 4.3 5.7* 6.2*   CHLORIDE 108   < > 108   < > 110   < > 106 105   < > 103 107 109   CO2 21   < > 20   < > 18*   < > 23 19*   < > 26 18* 13*   BUN 22   < > 26*   < > 25*   < > 28* 35*   < > 28* 35* 38*   CREATININE 1.07   < > 1.00   < > 0.87   < > 1.17 1.51*   < > 1.32 1.93* 1.79*   PHOSPHORUS  --    < >  --    < > 2.8   < > 2.6  2.6  --   --   --  2.8 2.8    < > = values in this interval not displayed.       Lab Results   Component Value Date/Time    WBC 4.0 (L) 09/21/2023 07:02 AM    RBC 4.05 (L) 09/21/2023 07:02 AM    HEMOGLOBIN 11.3 (L) 09/21/2023 07:02 AM    HEMATOCRIT 37.4 09/21/2023 07:02 AM    MCV 92.3 09/21/2023 07:02 AM    MCH 27.9 09/21/2023 07:02 AM    MCHC 30.2 (L) 09/21/2023 07:02 AM    MPV 11.0 09/21/2023 07:02 AM      Recent Labs      23  0702   WBC 4.0*   RBC 4.05*   HEMOGLOBIN 11.3*   HEMATOCRIT 37.4   MCV 92.3   MCH 27.9   MCHC 30.2*   RDW 51.0*   PLATELETCT 125*   MPV 11.0     Recent Labs     23  0702   SODIUM 135   POTASSIUM 6.2*   CHLORIDE 109   CO2 13*   GLUCOSE 101*   BUN 38*   CREATININE 1.79*   CALCIUM 10.1       URINALYSIS:  Lab Results   Component Value Date/Time    COLORURINE Yellow 2023 0707    CLARITY Clear 2023 0707    SPECGRAVITY 1.020 2023 0707    PHURINE 6.5 2023 0707    KETONES Negative 2023 0707    PROTEINURIN Negative 2023 0707    BILIRUBINUR Negative 2023 0707    UROBILU 0.2 2023 1616    NITRITE Negative 2023 0707    LEUKESTERAS Small (A) 2023 0707    OCCULTBLOOD Small (A) 2023 0707       [facility]  Lab Results   Component Value Date/Time    TOTPROTUR 16.0 (H) 2023 0707      Lab Results   Component Value Date/Time    CREATININEU 81.98 2023 0707         Imaging report(s) reviewed  CT-ABDOMEN-PELVIS WITH    (Results Pending)         Assessment:  Tere Gallegos is a 72 y.o. female with a history of end-stage renal disease status post  donor kidney transplant 10/31/2022 at [facility] notable for delayed graft function requiring dialysis until mid 2022, also complicated by a distal ureteral stricture requiring percutaneous transplant nephrostomy followed by ureteral stenting in May 2023 and ureteral re-implantation on 23, diabetes, hypertension, permanent atrial fibrillation, pancytopenia, BK viremia who presents today for follow-up.    Plan:  1. ESRD s/p kidney transplant 10/31/22  -Original ESRD diagnosis from solitary kidney, diabetes, hypertension.  Started hemodialysis in .  Patient remains with functioning kidney transplant.  She remains off of dialysis since 2022.      2.  SASHA on transplant stage 3b chronic kidney disease (HCC)  -Now with recurrent SASHA. With her ESBL E Coli UTI and pelvic  pain with guarding and tenderness, in the setting of recent ureteral surgery in 8/2023, I am concerned she might have an abscess or hematoma. I recommend stat abd/pelvis CT imaging with contrast. I recommend that she present to the ER given multiple acute problems. If SASHA and hyperkalemia persist, would recommend holding losartan, but I would prefer to maintain losartan if able.  Avoid NSAIDs and other nephrotoxins.  Recommend low-sodium diet.    3.  Hypertension  - Uncontrolled.  Unclear why patient stopped taking Lasix.  Given the hyperkalemia, I recommend that she resume Lasix at a dose of 80 mg daily.  If hyperkalemia worsens in the ER, I would recommend one-time Lasix 80 mg IV.  Maintain losartan for long-term kidney protection if able, but if hyperkalemia worsens, recommend holding losartan until hyperkalemia improves.      4. Permanent atrial fibrillation (HCC)  -Controlled on apixaban.  Patient has a history of atrial fibrillation with RVR, but currently controlled off of beta-blockers or calcium channel blockers.    5. Long term current use of immunosuppressive drug complicated by BK viremia  -Continue prednisone 5 mg daily, tacrolimus 2 mg p.o. twice daily, mycophenolate 250 mg p.o. twice daily.  Patient has a history of BK viremia.  Continue to monitor BK viruria and viremia.      6. Pancytopenia (HCC)  -Persistent, but mild.  This was present even prior to transplant.  Patient is on low-dose mycophenolate 250 mg p.o. twice daily.    7.  ESBL E. coli UTI  - No improvement with Bactrim.  Recommend start ciprofloxacin.  If GFR remains less than 30, dose is 500 mg once daily.  If GFR improves to above 30, then I would prescribe 500 mg p.o. twice daily.  Duration of at least 7 days.  Recommend abdomen and pelvis imaging with CT scan with contrast to define if patient has abscess or infected hematoma after recent ureteral surgery 8/7/2023.    8.  Hyperkalemia  - Recommend resume Lasix 80 mg daily.  Continue  losartan, but if hyperkalemia worsens, hold losartan until hyperkalemia improves.    9.  Metabolic acidosis  - Recommend increase odium bicarbonate from 650 mg p.o. twice daily to 1300 mg p.o. twice daily.    I have contacted the transfer and operation center, and given signout to ER provider Dr. Orville Tamayo.  Recommend at least standing monthly labs.  Return to clinic in 2 months with pre-clinic labs, and continue to bring home medications and blood pressure log.      Priyank Villar MD  Nephrology  [facility]

## 2023-09-23 PROBLEM — E87.20 METABOLIC ACIDOSIS: Status: ACTIVE | Noted: 2023-09-23

## 2023-09-23 PROBLEM — R00.1 BRADYCARDIA: Status: ACTIVE | Noted: 2023-09-23

## 2023-09-23 LAB
ALBUMIN SERPL BCP-MCNC: 4.3 G/DL (ref 3.2–4.9)
ALBUMIN/GLOB SERPL: 1.5 G/DL
ALP SERPL-CCNC: 81 U/L (ref 30–99)
ALT SERPL-CCNC: 17 U/L (ref 2–50)
ANION GAP SERPL CALC-SCNC: 13 MMOL/L (ref 7–16)
AST SERPL-CCNC: 19 U/L (ref 12–45)
BILIRUB SERPL-MCNC: 0.4 MG/DL (ref 0.1–1.5)
BUN SERPL-MCNC: 33 MG/DL (ref 8–22)
CALCIUM ALBUM COR SERPL-MCNC: 10.6 MG/DL (ref 8.5–10.5)
CALCIUM SERPL-MCNC: 10.8 MG/DL (ref 8.5–10.5)
CHLORIDE SERPL-SCNC: 106 MMOL/L (ref 96–112)
CO2 SERPL-SCNC: 16 MMOL/L (ref 20–33)
CREAT SERPL-MCNC: 1.48 MG/DL (ref 0.5–1.4)
ERYTHROCYTE [DISTWIDTH] IN BLOOD BY AUTOMATED COUNT: 48.9 FL (ref 35.9–50)
GFR SERPLBLD CREATININE-BSD FMLA CKD-EPI: 37 ML/MIN/1.73 M 2
GLOBULIN SER CALC-MCNC: 2.9 G/DL (ref 1.9–3.5)
GLUCOSE BLD STRIP.AUTO-MCNC: 113 MG/DL (ref 65–99)
GLUCOSE BLD STRIP.AUTO-MCNC: 131 MG/DL (ref 65–99)
GLUCOSE BLD STRIP.AUTO-MCNC: 180 MG/DL (ref 65–99)
GLUCOSE SERPL-MCNC: 73 MG/DL (ref 65–99)
HCT VFR BLD AUTO: 39.5 % (ref 37–47)
HGB BLD-MCNC: 12.1 G/DL (ref 12–16)
MAGNESIUM SERPL-MCNC: 1.6 MG/DL (ref 1.5–2.5)
MCH RBC QN AUTO: 27.7 PG (ref 27–33)
MCHC RBC AUTO-ENTMCNC: 30.6 G/DL (ref 32.2–35.5)
MCV RBC AUTO: 90.4 FL (ref 81.4–97.8)
PLATELET # BLD AUTO: 128 K/UL (ref 164–446)
PMV BLD AUTO: 11.2 FL (ref 9–12.9)
POTASSIUM SERPL-SCNC: 5.6 MMOL/L (ref 3.6–5.5)
POTASSIUM SERPL-SCNC: 5.7 MMOL/L (ref 3.6–5.5)
PROT SERPL-MCNC: 7.2 G/DL (ref 6–8.2)
RBC # BLD AUTO: 4.37 M/UL (ref 4.2–5.4)
SODIUM SERPL-SCNC: 135 MMOL/L (ref 135–145)
WBC # BLD AUTO: 3.9 K/UL (ref 4.8–10.8)

## 2023-09-23 PROCEDURE — 84132 ASSAY OF SERUM POTASSIUM: CPT

## 2023-09-23 PROCEDURE — 82962 GLUCOSE BLOOD TEST: CPT | Mod: 91

## 2023-09-23 PROCEDURE — 80053 COMPREHEN METABOLIC PANEL: CPT

## 2023-09-23 PROCEDURE — 96367 TX/PROPH/DG ADDL SEQ IV INF: CPT

## 2023-09-23 PROCEDURE — 99222 1ST HOSP IP/OBS MODERATE 55: CPT | Performed by: INTERNAL MEDICINE

## 2023-09-23 PROCEDURE — 99233 SBSQ HOSP IP/OBS HIGH 50: CPT | Performed by: HOSPITALIST

## 2023-09-23 PROCEDURE — 770020 HCHG ROOM/CARE - TELE (206)

## 2023-09-23 PROCEDURE — 700111 HCHG RX REV CODE 636 W/ 250 OVERRIDE (IP): Performed by: STUDENT IN AN ORGANIZED HEALTH CARE EDUCATION/TRAINING PROGRAM

## 2023-09-23 PROCEDURE — A9270 NON-COVERED ITEM OR SERVICE: HCPCS | Performed by: STUDENT IN AN ORGANIZED HEALTH CARE EDUCATION/TRAINING PROGRAM

## 2023-09-23 PROCEDURE — 700105 HCHG RX REV CODE 258: Performed by: STUDENT IN AN ORGANIZED HEALTH CARE EDUCATION/TRAINING PROGRAM

## 2023-09-23 PROCEDURE — 700102 HCHG RX REV CODE 250 W/ 637 OVERRIDE(OP): Performed by: STUDENT IN AN ORGANIZED HEALTH CARE EDUCATION/TRAINING PROGRAM

## 2023-09-23 PROCEDURE — 87040 BLOOD CULTURE FOR BACTERIA: CPT | Mod: 91

## 2023-09-23 PROCEDURE — 85027 COMPLETE CBC AUTOMATED: CPT

## 2023-09-23 PROCEDURE — 36415 COLL VENOUS BLD VENIPUNCTURE: CPT

## 2023-09-23 RX ORDER — SULFAMETHOXAZOLE AND TRIMETHOPRIM 800; 160 MG/1; MG/1
1 TABLET ORAL 2 TIMES DAILY
Status: ON HOLD | COMMUNITY
End: 2023-09-25

## 2023-09-23 RX ORDER — TACROLIMUS 1 MG/1
2 CAPSULE ORAL 2 TIMES DAILY
Status: ON HOLD | COMMUNITY
End: 2023-09-25

## 2023-09-23 RX ADMIN — TACROLIMUS 2 MG: 1 CAPSULE ORAL at 02:19

## 2023-09-23 RX ADMIN — MEROPENEM 500 MG: 500 INJECTION, POWDER, FOR SOLUTION INTRAVENOUS at 02:19

## 2023-09-23 RX ADMIN — PREDNISONE 5 MG: 5 TABLET ORAL at 05:13

## 2023-09-23 RX ADMIN — MYCOPHENOLATE MOFETIL 250 MG: 250 CAPSULE ORAL at 02:19

## 2023-09-23 RX ADMIN — APIXABAN 2.5 MG: 2.5 TABLET, FILM COATED ORAL at 05:13

## 2023-09-23 RX ADMIN — SODIUM BICARBONATE 1300 MG: 650 TABLET ORAL at 18:05

## 2023-09-23 RX ADMIN — MEROPENEM 500 MG: 500 INJECTION, POWDER, FOR SOLUTION INTRAVENOUS at 23:35

## 2023-09-23 RX ADMIN — AMLODIPINE BESYLATE 5 MG: 5 TABLET ORAL at 05:12

## 2023-09-23 RX ADMIN — APIXABAN 2.5 MG: 2.5 TABLET, FILM COATED ORAL at 18:05

## 2023-09-23 RX ADMIN — ATORVASTATIN CALCIUM 20 MG: 20 TABLET, FILM COATED ORAL at 02:19

## 2023-09-23 RX ADMIN — LEVOTHYROXINE SODIUM 75 MCG: 0.07 TABLET ORAL at 05:12

## 2023-09-23 RX ADMIN — MYCOPHENOLATE MOFETIL 250 MG: 250 CAPSULE ORAL at 18:05

## 2023-09-23 RX ADMIN — AMIODARONE HYDROCHLORIDE 200 MG: 200 TABLET ORAL at 05:13

## 2023-09-23 RX ADMIN — TACROLIMUS 2 MG: 1 CAPSULE ORAL at 18:05

## 2023-09-23 RX ADMIN — MEROPENEM 500 MG: 500 INJECTION, POWDER, FOR SOLUTION INTRAVENOUS at 13:59

## 2023-09-23 RX ADMIN — SODIUM BICARBONATE 1300 MG: 650 TABLET ORAL at 05:12

## 2023-09-23 RX ADMIN — ATORVASTATIN CALCIUM 20 MG: 20 TABLET, FILM COATED ORAL at 20:33

## 2023-09-23 RX ADMIN — SENNOSIDES AND DOCUSATE SODIUM 2 TABLET: 50; 8.6 TABLET ORAL at 18:04

## 2023-09-23 ASSESSMENT — COGNITIVE AND FUNCTIONAL STATUS - GENERAL
TOILETING: A LITTLE
DAILY ACTIVITIY SCORE: 23
SUGGESTED CMS G CODE MODIFIER MOBILITY: CI
CLIMB 3 TO 5 STEPS WITH RAILING: A LITTLE
SUGGESTED CMS G CODE MODIFIER DAILY ACTIVITY: CI
MOBILITY SCORE: 23

## 2023-09-23 ASSESSMENT — LIFESTYLE VARIABLES
DOES PATIENT WANT TO STOP DRINKING: NO
HAVE YOU EVER FELT YOU SHOULD CUT DOWN ON YOUR DRINKING: NO
HOW MANY TIMES IN THE PAST YEAR HAVE YOU HAD 5 OR MORE DRINKS IN A DAY: 0
TOTAL SCORE: 0
EVER HAD A DRINK FIRST THING IN THE MORNING TO STEADY YOUR NERVES TO GET RID OF A HANGOVER: NO
CONSUMPTION TOTAL: NEGATIVE
EVER FELT BAD OR GUILTY ABOUT YOUR DRINKING: NO
ON A TYPICAL DAY WHEN YOU DRINK ALCOHOL HOW MANY DRINKS DO YOU HAVE: 0
AVERAGE NUMBER OF DAYS PER WEEK YOU HAVE A DRINK CONTAINING ALCOHOL: 0
ALCOHOL_USE: NO
HAVE PEOPLE ANNOYED YOU BY CRITICIZING YOUR DRINKING: NO

## 2023-09-23 ASSESSMENT — CHA2DS2 SCORE
DIABETES: YES
HYPERTENSION: YES
AGE 65 TO 74: NO
VASCULAR DISEASE: YES
CHA2DS2 VASC SCORE: 5
AGE 75 OR GREATER: NO
PRIOR STROKE OR TIA OR THROMBOEMBOLISM: NO
CHF OR LEFT VENTRICULAR DYSFUNCTION: YES
SEX: FEMALE

## 2023-09-23 ASSESSMENT — ENCOUNTER SYMPTOMS
HEADACHES: 0
ABDOMINAL PAIN: 0
NAUSEA: 0
PND: 0
WHEEZING: 0
HEMOPTYSIS: 0
VOMITING: 0
DOUBLE VISION: 0
DEPRESSION: 0
FEVER: 0
BRUISES/BLEEDS EASILY: 0
BACK PAIN: 0
DIZZINESS: 0
NECK PAIN: 0
BLURRED VISION: 0
MYALGIAS: 0
CHILLS: 0
COUGH: 0
DIARRHEA: 0
ORTHOPNEA: 0
CLAUDICATION: 0
HEARTBURN: 0
PALPITATIONS: 0

## 2023-09-23 ASSESSMENT — PATIENT HEALTH QUESTIONNAIRE - PHQ9
SUM OF ALL RESPONSES TO PHQ9 QUESTIONS 1 AND 2: 0
1. LITTLE INTEREST OR PLEASURE IN DOING THINGS: NOT AT ALL
2. FEELING DOWN, DEPRESSED, IRRITABLE, OR HOPELESS: NOT AT ALL

## 2023-09-23 ASSESSMENT — FIBROSIS 4 INDEX: FIB4 SCORE: 2.96

## 2023-09-23 ASSESSMENT — PAIN DESCRIPTION - PAIN TYPE: TYPE: ACUTE PAIN

## 2023-09-23 NOTE — H&P
Hospital Medicine History & Physical Note    Date of Service  2023    Primary Care Physician  MALGORZATA Concepcion.    Consultants  nephrology        Code Status  Full Code    Chief Complaint  Chief Complaint   Patient presents with    Painful Urination     For 3 weeks. Denies blood in urine, abdominal pain.       Flank Pain     Right flank pain. Kidney transplant recipient .       History of Presenting Illness  Tere Gallegos is a 72 y.o. female with past medical history of end-stage renal disease status post  donor kidney transplant 2022, which was notable for delayed graft function requiring dialysis until mid 2022.  This is complicated by distal ureteral stricture requiring percutaneous transfer nephrostomy following a year dural stent in May 2023 and ureteral reimplantation on 2023, diabetes mellitus hypertension, permanent atrial fibrillation who presented 2023 after being sent here from nephrology.  She had some concerning labs including an SASHA, and a persistent UTI.  She has been complaining of some dysuria for a while now.  She has been taking ciprofloxacin at home, but has only had minimal improvement.  She does have some pain localized in her lower abdomen.  Patient denies any blood in her urine.  Denies any fevers or chills.  No nausea or vomiting.  Nephrology service wanted patient to be admitted as they were concerned for an intra-abdominal abscess.  CT scan was obtained, no abscesses identified.  Nephrology service would like the patient to be started on IV antibiotics.    Patient is [place]an speaking.  used: [id number]    I discussed the plan of care with patient.    Review of Systems  Review of Systems   Constitutional:  Negative for chills and fever.   Cardiovascular:  Negative for chest pain, palpitations and orthopnea.   Gastrointestinal:  Negative for abdominal pain, diarrhea, nausea and vomiting.   Genitourinary:  Positive for  dysuria and frequency. Negative for urgency.   Musculoskeletal:  Negative for back pain and neck pain.   Neurological:  Negative for dizziness and headaches.   All other systems reviewed and are negative.      Past Medical History   has a past medical history of Acquired hypothyroidism (05/04/2020), CAD (coronary artery disease), Chronic diastolic heart failure (HCC) (05/04/2020), Coronary artery disease due to lipid rich plaque, Dental disorder, Diabetes ([facility]), ESRD (end stage renal disease) on dialysis ([facility]) (05/04/2020), Hemodialysis patient ([facility]), Hyperlipidemia, Hypertension, Kidney transplant candidate, Kidney transplant recipient (10/31/2022), Presence of drug-eluting stent in right coronary artery, QT prolongation (01/22/2020), RLS (restless legs syndrome) (08/05/2016), and Transaminitis (12/22/2018).    Surgical History   has a past surgical history that includes recovery (08/16/2016); other abdominal surgery; other (Left, 2014); zzz cardiac cath (08/16/2016); zzz cardiac cath (09/07/2016); other abdominal surgery (Right, 10/31/2022); and ureteral reimplantation (08/07/2023).     Family History  family history includes Diabetes in her brother and sister; Other in her sister.   Family history reviewed with patient. There is no family history that is pertinent to the chief complaint.     Social History   reports that she has never smoked. She has never used smokeless tobacco. She reports that she does not drink alcohol and does not use drugs.    Allergies  No Known Allergies    Medications  Prior to Admission Medications   Prescriptions Last Dose Informant Patient Reported? Taking?   Blood Glucose Meter Kit   No No   Sig: Test blood sugar as recommended by provider. Covered blood glucose monitoring kit.   Lancets  Patient, Family Member No No   Sig: Use one Freestyle Pearl lancet to test blood sugar once daily .   amLODIPine (NORVASC) 5 MG Tab   No No   Sig: Take 1 Tablet by mouth every day.   amiodarone  (CORDARONE) 200 MG Tab   No No   Sig: Take 1 Tablet by mouth every day for 30 days.   apixaban (ELIQUIS) 2.5mg Tab  Patient, Family Member No No   Sig: Take 1 Tablet by mouth 2 times a day.   atorvastatin (LIPITOR) 20 MG Tab  Patient, Family Member No No   Sig: Take 1 Tablet by mouth every evening.   ciprofloxacin (CIPRO) 500 MG Tab   No No   Sig: Take 1 Tablet by mouth every day for 7 days.   furosemide (LASIX) 80 MG Tab   No No   Sig: Take 1 Tablet by mouth every day.   hydrOXYzine HCl (ATARAX) 25 MG Tab  Patient, Family Member No No   Sig: TOME FERNANDO TABLETA DOS VECES AL DESMOND CUANDO SEA NECESARIO PARA LA PICAZON   insulin aspart (NOVOLOG FLEXPEN) 100 UNIT/ML injection PEN  Patient, Family Member Yes No   Sig: Inject 4 Units under the skin 3 times a day as needed for High Blood Sugar. If BS>200=Pt takes 4 units of Insulin  * pt tests before meals*   insulin glargine (LANTUS SOLOSTAR) 100 UNIT/ML Solution Pen-injector injection  Patient, Family Member Yes No   Sig: Inject 4 Units under the skin every day.   levothyroxine (SYNTHROID) 75 MCG Tab  Patient, Family Member No No   Sig: Take 1 Tablet by mouth every morning on an empty stomach.   losartan (COZAAR) 100 MG Tab  Patient, Family Member No No   Sig: Take 1 Tablet by mouth every evening.   mycophenolate (CELLCEPT) 250 MG Cap  Patient, Family Member No No   Sig: Take 1 Capsule by mouth 2 times a day.   pravastatin (PRAVACHOL) 40 MG tablet   No No   Sig: Take 1 Tablet by mouth every evening.   predniSONE (DELTASONE) 5 MG Tab  Patient, Family Member Yes No   Sig: Take 10 mg by mouth every day.   sodium bicarbonate (SODIUM BICARBONATE) 650 MG Tab   No No   Sig: Take 2 Tablets by mouth 2 times a day.   tacrolimus (PROGRAF) 1 MG Cap   No No   Sig: Take 2 Capsules by mouth every morning.      Facility-Administered Medications: None       Physical Exam  Temp:  [35.9 °C (96.7 °F)-36.2 °C (97.1 °F)] 35.9 °C (96.7 °F)  Pulse:  [49-55] 53  Resp:  [16-22] 19  BP:  (144-219)/(54-86) 186/79  SpO2:  [95 %-98 %] 95 %  Blood Pressure : (!) 169/76   Temperature: 35.9 °C (96.7 °F)   Pulse: (!) 49   Respiration: 17   Pulse Oximetry: 96 %       Physical Exam  Constitutional:       General: She is not in acute distress.     Appearance: Normal appearance. She is normal weight. She is not ill-appearing, toxic-appearing or diaphoretic.   HENT:      Head: Normocephalic and atraumatic.      Nose: Nose normal.      Mouth/Throat:      Mouth: Mucous membranes are moist.   Eyes:      Extraocular Movements: Extraocular movements intact.      Pupils: Pupils are equal, round, and reactive to light.   Cardiovascular:      Rate and Rhythm: Normal rate and regular rhythm.      Pulses: Normal pulses.      Heart sounds: Normal heart sounds. No murmur heard.     No friction rub. No gallop.   Pulmonary:      Effort: Pulmonary effort is normal. No respiratory distress.      Breath sounds: No stridor. No wheezing, rhonchi or rales.   Chest:      Chest wall: No tenderness.   Abdominal:      General: Abdomen is flat. There is no distension.      Palpations: Abdomen is soft. There is no mass.      Tenderness: There is no abdominal tenderness. There is no guarding or rebound.      Hernia: No hernia is present.   Musculoskeletal:         General: No swelling, tenderness, deformity or signs of injury.      Right lower leg: No edema.      Left lower leg: No edema.      Comments:      Skin:     General: Skin is warm and dry.      Capillary Refill: Capillary refill takes less than 2 seconds.      Coloration: Skin is not jaundiced or pale.      Findings: No bruising, erythema, lesion or rash.   Neurological:      General: No focal deficit present.      Mental Status: She is alert and oriented to person, place, and time. Mental status is at baseline.      Cranial Nerves: No cranial nerve deficit.      Sensory: No sensory deficit.      Motor: No weakness.      Coordination: Coordination normal.   Psychiatric:          "Mood and Affect: Mood normal.         Behavior: Behavior normal.         Laboratory:  Recent Labs     09/21/23  0702 09/22/23 1939   WBC 4.0* 4.2*   RBC 4.05* 4.33   HEMOGLOBIN 11.3* 12.2   HEMATOCRIT 37.4 38.7   MCV 92.3 89.4   MCH 27.9 28.2   MCHC 30.2* 31.5*   RDW 51.0* 48.7   PLATELETCT 125* 122*   MPV 11.0 10.6     Recent Labs     09/21/23  0702 09/22/23 1939   SODIUM 135 136   POTASSIUM 6.2* 6.5*   CHLORIDE 109 108   CO2 13* 16*   GLUCOSE 101* 124*   BUN 38* 34*   CREATININE 1.79* 1.70*   CALCIUM 10.1 10.1     Recent Labs     09/21/23 0702 09/22/23 1939   ALTSGPT  --  18   ASTSGOT  --  21   ALKPHOSPHAT  --  82   TBILIRUBIN  --  0.4   GLUCOSE 101* 124*         No results for input(s): \"NTPROBNP\" in the last 72 hours.      No results for input(s): \"TROPONINT\" in the last 72 hours.    Imaging:  CT-ABDOMEN-PELVIS WITH   Final Result      1.  Right-sided renal transplant is noted in right lower abdomen and pelvis. There is mild right hydronephrosis and ureteral stent extends from the renal pelvis into the urinary bladder.      2.  Post left nephrectomy.      3.  Atrophy of the native right kidney.          X-Ray:  I have personally reviewed the images and compared with prior images.  EKG:  I have personally reviewed the images and compared with prior images.    Assessment/Plan:  Justification for Admission Status  I anticipate this patient will require at least two midnights for appropriate medical management, necessitating inpatient admission because SASHA in the setting of renal transplant, hyperkalemia requiring IV insulin and dextrose.  She will also need IV antibiotics in setting of UTI.  UTI requiring IV antibiotics, hyperkalemia requiring IV insulin and dextrose.    Patient will need a Med/Surg bed on EMERGENCY service .  The need is secondary to hyperkalemia, UTI, SASHA.    * UTI (urinary tract infection)  Assessment & Plan  Patient presents with ongoing UTI. She was treated with Bactrim as outpatient " without improvement. Plan was for outpatient management with Cipro (as per ID) but patient did not start   Nephrology recommended admission to hospital for management with IV abx  Given history, starting patient on meropenem.   Day team to consult ID  Concern for infected ureteral stent        Atrial fibrillation with RVR (HCC)- (present on admission)  Assessment & Plan  Currently rate controlled  Continue amiodarone and apixiban    Acute renal failure superimposed on chronic kidney disease, unspecified CKD stage, unspecified acute renal failure type (HCC)- (present on admission)  Assessment & Plan  Patient presents with mild SASHA  Currently receiving IVF  Recheck CMP in the morning     Chronic anticoagulation- (present on admission)  Assessment & Plan  Continue with apixiban     Acquired hypothyroidism- (present on admission)  Assessment & Plan  Continue synthroid  Most recent TSH WNL    ESRD s/p kidney transplant 10/31/22- (present on admission)  Assessment & Plan  Resume immunosuppressives   Nephrology following    Chronic heart failure with preserved ejection fraction (HCC)- (present on admission)  Assessment & Plan  Last echo done on 9/2023: Normal left ventricular size, thickness, systolic function, and diastolic function  Continue Losartan  Holding diuretics in the setting of SASHA     Diabetes (HCC)- (present on admission)  Assessment & Plan  Insulin sliding scale  Hypoglycemia protocol     Hyperkalemia  Assessment & Plan  K: 6.5 on admission  Received insulin and dextrose int he ED  Follow up recheck   No need for emergent dialysis at this time         VTE prophylaxis: therapeutic anticoagulation with apixiban

## 2023-09-23 NOTE — ASSESSMENT & PLAN NOTE
K: 6.5 on admission  Received insulin and dextrose int he ED  Follow up recheck   No need for emergent dialysis at this time     Repeat K 5.6 trending down , continue monitoring .  Close monitoring potassium levels 5.1

## 2023-09-23 NOTE — HOSPITAL COURSE
Tere Gallegos is a 72 y.o. female with past medical history of end-stage renal disease status post  donor kidney transplant 2022, which was notable for delayed graft function requiring dialysis until mid 2022.  This is complicated by distal ureteral stricture requiring percutaneous transfer nephrostomy following a year dural stent in May 2023 and ureteral reimplantation on 2023, diabetes mellitus hypertension, permanent atrial fibrillation who presented 2023 after being sent here from nephrology.  She had some concerning labs including an SASHA, and a persistent UTI.  She has been complaining of some dysuria for a while now.  She has been taking ciprofloxacin at home, but has only had minimal improvement.  She does have some pain localized in her lower abdomen.  Patient denies any blood in her urine.  Denies any fevers or chills.  No nausea or vomiting.  Nephrology service wanted patient to be admitted as they were concerned for an intra-abdominal abscess.  CT scan was obtained, no abscesses identified.  Nephrology service would like the patient to be started on IV antibiotics.

## 2023-09-23 NOTE — ED TRIAGE NOTES
Tere Christensenadrienne  72 y.o. female  Chief Complaint   Patient presents with    Painful Urination     For 3 weeks. Denies blood in urine, abdominal pain.       Flank Pain     Right flank pain. Kidney transplant recipient 2022.     Pt ambulatory to triage with steady gait for above complaint accompnaied by son.   Pt is GCS 15, speaking in full sentences, follows commands and responds appropriately to questions. Resp are even and unlabored.     dysuria protocol ordered. Pt placed in ED lobby. Pt educated on triage process. Pt encouraged to alert staff for any changes.       Vitals:    09/22/23 1724   BP: (!) 162/54   Pulse: (!) 55   Resp: 16   Temp: 35.9 °C (96.7 °F)   SpO2: 98%

## 2023-09-23 NOTE — ED NOTES
Med rec is partially complete per patient at bedside, with medication list and medication bottles provided by patient (reviewed and returned), and [facility] ([phone], patient fills at [facility] on [address]). Chinese interpreters utilized for patient (Dominique [id number], Ta [phone]).    Patient was unable to verify all of the medications that she was using at home, how much she takes of certain medications, etc. Patient states that her son Manoj ([phone]) helps her with her medications. Attempted to contact patient's son and received no answer. In addition to [facility], patient also fills at [facility] on [address] ([phone]) which is closed at this time; pharmacy will be open at 10:00.    Patient had amiodarone on her medication list, however it was not among the bottles provided by patient. Patient was unable to verify whether she is using this medication or not. Per [facility], amidarone 200 mg, directions 1 tablet every day, was dispensed on 9/6/2023 for #30 tablets.    Patient had an RX bottle for ELIQUIS 5 mg, however dispense date on bottle was 3/6/2023 and quantity dispensed was #180 tablets (90 days supply). Patient had a prescription on file for ELIQUIS 2.5 mg, prescribed on 6/27/2023 and sent to [facility] on [facility]. Unable to verify dispense history with [facility] at this time. Patient states that she is using the 5 mg tablets and states that she DOES NOT cut the tablets in half. Patient states her last dose of ELIQUIS was 9/22/2023 in the morning.    Patient was unable to verify whether or not she is using furosemide. Furosemide not among RX bottles at bedside and not present on medication list provided by patient. Patient had a new prescription for furosemide 80 mg sent to [facility] on 9/22/2023. Prior to this, patient had a prescription on file for furosemide 40 mg sent to [facility] on 6/6/2023. Unable to verify dispense history with [facility] at this  time.    Patient had a new prescription for pravastatin sent to [facility] on [address] on 9/6/2023, however this was never dispensed.    Directions on patient's RX bottle for prednisone 5 mg state to take 2 tablets (10 mg) every morning, however this has been crossed out with pen and 1 tablet is written instead. Patient states that she takes 1 tablet and is unable to verify if she ever took 2, or if her dose was reduced.    Directions on patient's RX bottle for tacrolimus 1 mg are to take 5 capsules every 12 hours. Patient states that she takes 2 capsules 2 times per day. A new prescription for tacrolimus 1 mg with directions to take 2 capsules every morning was sent to [facility] on 9/6/2023. [facility] states this was never dispensed.    Patient was UNABLE to verify the insulin she uses at home. Per [facility], Novolog Flexpen (0-12 units 3 times a day before meals, unspecified sliding scale) was last dispensed on 5/30/2023 for a 41 day supply, and Lantus Solostar (5 units once per day) was last dispensed on 6/23/2023 for an 85 day supply.    On 9/8/2023 patient was prescribed a 7 day course of Bactrim DS. Patient states that she finished this antibiotic.    Patient was given a new prescription for ciprofloxacin on 9/22/2023. Unable to verify with patient whether she has started this antibiotic. Prescription was sent to [facility]. Unable to verify dispense history with [facility] at this time.

## 2023-09-23 NOTE — ASSESSMENT & PLAN NOTE
Last echo done on 9/2023: Normal left ventricular size, thickness, systolic function, and diastolic function  Continue Losartan  Holding diuretics in the setting of SASHA     Monitoring.   No acute exacerbation.

## 2023-09-23 NOTE — ED NOTES
Patient medicated per MAR, tolerated all pills together well with sips of water.  Patient given warm meal and applesauce, tolerating well independently with no S/S of aspiration.

## 2023-09-23 NOTE — PROGRESS NOTES
Hospital Medicine Daily Progress Note    Date of Service  2023    Chief Complaint  Tere Gallegos is a 72 y.o. female admitted 2023 with UTI, SASHA    Hospital Course  Tere Gallegos is a 72 y.o. female with past medical history of end-stage renal disease status post  donor kidney transplant 2022, which was notable for delayed graft function requiring dialysis until mid 2022.  This is complicated by distal ureteral stricture requiring percutaneous transfer nephrostomy following a year dural stent in May 2023 and ureteral reimplantation on 2023, diabetes mellitus hypertension, permanent atrial fibrillation who presented 2023 after being sent here from nephrology.  She had some concerning labs including an SASHA, and a persistent UTI.  She has been complaining of some dysuria for a while now.  She has been taking ciprofloxacin at home, but has only had minimal improvement.  She does have some pain localized in her lower abdomen.  Patient denies any blood in her urine.  Denies any fevers or chills.  No nausea or vomiting.  Nephrology service wanted patient to be admitted as they were concerned for an intra-abdominal abscess.  CT scan was obtained, no abscesses identified.  Nephrology service would like the patient to be started on IV antibiotics.    Interval Problem Update   patient is new to me today, patient in bed, feels better, no fever or chills, urinary symptoms improved I have ordered Urine culture, continue on meropenem, monitoring for side effects from iv meropenem.     I have discussed this patient's plan of care and discharge plan at IDT rounds today with Case Management, Nursing, Nursing leadership, and other members of the IDT team.    Consultants/Specialty  na    Code Status  Full Code    Disposition  The patient is not medically cleared for discharge to home or a post-acute facility.      I have placed the appropriate orders for  post-discharge needs.    Review of Systems  Review of Systems   Constitutional:  Negative for chills and fever.   Eyes:  Negative for blurred vision and double vision.   Respiratory:  Negative for cough, hemoptysis and wheezing.    Cardiovascular:  Negative for chest pain, palpitations, claudication, leg swelling and PND.   Gastrointestinal:  Negative for heartburn, nausea and vomiting.   Genitourinary:  Negative for hematuria and urgency.   Musculoskeletal:  Negative for back pain and myalgias.   Skin:  Negative for rash.   Neurological:  Negative for dizziness and headaches.   Endo/Heme/Allergies:  Does not bruise/bleed easily.   Psychiatric/Behavioral:  Negative for depression.         Physical Exam  Temp:  [35.9 °C (96.7 °F)-36.7 °C (98.1 °F)] 36.6 °C (97.8 °F)  Pulse:  [46-55] 55  Resp:  [16-22] 16  BP: (138-219)/(37-86) 138/48  SpO2:  [94 %-98 %] 94 %    Physical Exam  Vitals and nursing note reviewed.   Constitutional:       Appearance: Normal appearance. She is ill-appearing.   HENT:      Head: Normocephalic.      Mouth/Throat:      Mouth: Mucous membranes are moist.      Pharynx: Oropharynx is clear. No oropharyngeal exudate or posterior oropharyngeal erythema.   Eyes:      General: No scleral icterus.        Right eye: No discharge.         Left eye: No discharge.      Extraocular Movements: Extraocular movements intact.      Conjunctiva/sclera: Conjunctivae normal.   Cardiovascular:      Rate and Rhythm: Regular rhythm. Bradycardia present.      Pulses: Normal pulses.      Heart sounds: Normal heart sounds.   Pulmonary:      Effort: Pulmonary effort is normal. No respiratory distress.      Breath sounds: Normal breath sounds.   Abdominal:      General: Bowel sounds are normal. There is no distension.      Tenderness: There is no abdominal tenderness. There is no guarding.   Musculoskeletal:         General: Normal range of motion.      Cervical back: Normal range of motion and neck supple.      Right  lower leg: No edema.      Left lower leg: No edema.   Skin:     General: Skin is warm and dry.      Capillary Refill: Capillary refill takes less than 2 seconds.      Coloration: Skin is not jaundiced.   Neurological:      General: No focal deficit present.      Mental Status: She is alert and oriented to person, place, and time.      Cranial Nerves: No cranial nerve deficit.   Psychiatric:         Mood and Affect: Mood normal.         Behavior: Behavior normal.         Fluids  No intake or output data in the 24 hours ending 09/23/23 1428    Laboratory  Recent Labs     09/21/23  0702 09/22/23 1939 09/23/23 0156   WBC 4.0* 4.2* 3.9*   RBC 4.05* 4.33 4.37   HEMOGLOBIN 11.3* 12.2 12.1   HEMATOCRIT 37.4 38.7 39.5   MCV 92.3 89.4 90.4   MCH 27.9 28.2 27.7   MCHC 30.2* 31.5* 30.6*   RDW 51.0* 48.7 48.9   PLATELETCT 125* 122* 128*   MPV 11.0 10.6 11.2     Recent Labs     09/21/23  0702 09/22/23 1939 09/23/23 0156 09/23/23  0808   SODIUM 135 136 135  --    POTASSIUM 6.2* 6.5* 5.7* 5.6*   CHLORIDE 109 108 106  --    CO2 13* 16* 16*  --    GLUCOSE 101* 124* 73  --    BUN 38* 34* 33*  --    CREATININE 1.79* 1.70* 1.48*  --    CALCIUM 10.1 10.1 10.8*  --                    Imaging  CT-ABDOMEN-PELVIS WITH   Final Result      1.  Right-sided renal transplant is noted in right lower abdomen and pelvis. There is mild right hydronephrosis and ureteral stent extends from the renal pelvis into the urinary bladder.      2.  Post left nephrectomy.      3.  Atrophy of the native right kidney.           Assessment/Plan  * UTI (urinary tract infection)  Assessment & Plan  Patient presents with ongoing UTI. She was treated with Bactrim as outpatient without improvement. Plan was for outpatient management with Cipro (as per ID) but patient did not start   Nephrology recommended admission to hospital for management with IV abx  Given history, starting patient on meropenem.   Day team to consult ID  Concern for infected ureteral stent    I  have ordered urine culture  Continue iv meropenem for now  CT abd showed mild right hydronephrosis and ureteral stent extends from the renal pelvis into the urinary bladder.  Cr trending down.       Bradycardia  Assessment & Plan  Asymptomatic  Patient on amiodarone  Continue telemetry.    Metabolic acidosis  Assessment & Plan  Continue bicarb po.   Monitoring bmp.     Atrial fibrillation with RVR ([facility])- (present on admission)  Assessment & Plan  Currently rate controlled  Continue amiodarone and apixiban    Acute renal failure superimposed on chronic kidney disease, unspecified CKD stage, unspecified acute renal failure type (HCC)- (present on admission)  Assessment & Plan  Patient presents with mild SASHA  Currently receiving IVF  Monitoring bmp   Cr 1.48    Chronic anticoagulation- (present on admission)  Assessment & Plan  Continue with apixiban   Watching for bleeding.    Acquired hypothyroidism- (present on admission)  Assessment & Plan  Continue synthroid  Most recent TSH WNL    ESRD s/p kidney transplant 10/31/22- (present on admission)  Assessment & Plan  Resume immunosuppressives   Nephrology Dr Villar as outpatient.   Monitoring.     Chronic heart failure with preserved ejection fraction (HCC)- (present on admission)  Assessment & Plan  Last echo done on 9/2023: Normal left ventricular size, thickness, systolic function, and diastolic function  Continue Losartan  Holding diuretics in the setting of SASHA     Monitoring.   No acute exacerbation.     Diabetes (HCC)- (present on admission)  Assessment & Plan  Insulin sliding scale  Hypoglycemia protocol   Continue iss.     Hyperkalemia  Assessment & Plan  K: 6.5 on admission  Received insulin and dextrose int he ED  Follow up recheck   No need for emergent dialysis at this time     Repeat K 5.6 trending down , continue monitoring .         VTE prophylaxis: on eliquis for a fib    I have performed a physical exam and reviewed and updated ROS and Plan today  (9/23/2023). In review of yesterday's note (9/22/2023), there are no changes except as documented above.      Greater than 51 minutes spent prepping to see patient (e.g. review of tests) obtaining and/or reviewing separately obtained history. Performing a medically appropriate examination and/ evaluation.  Counseling and educating the patient/family/caregiver.  Ordering medications, tests, or procedures.  Referring and communicating with other health care professionals.  Documenting clinical information in EPIC.  Independently interpreting results and communicating results to patient/family/caregiver.  Care coordination.

## 2023-09-23 NOTE — CONSULTS
NEPHROLOGY HISTORY AND PHYSICAL CONSULT NOTE    Consult Requested By: Gregg Montoya M.D.    HPI:  Tere Gallegos is a 72 y.o. C sternal renal transplant in 2022, coronary artery disease, diabetes status post drug-eluting stent placement  who presents with acute kidney injury.      Patient is being sent in by primary nephrologist Dr. Villar for abnormal labs.  Patient had been previously treated for persistent urinary tract infection.  She reported some ongoing abdominal pain however denies pain with urination.      She is otherwise in her normal state of health. Feels much better at this time. Denies chest pain and shortness of breath    Past medical history   donor renal transplant 2022  Pretension  Hyperlipidemia  Diabetes  Coronary artery disease status post drug-eluting stent to RCA  Diastolic heart failure    Social history  Denies smoking, denies alcohol, denies illicit drug use    Family history  Denies family history renal diseae      [unfilled]     No Known Allergies    ROS    /48   Pulse (!) 55   Temp 36.6 °C (97.8 °F) (Temporal)   Resp 16   Ht 1.524 m (5')   Wt 54.2 kg (119 lb 7.8 oz)   LMP  (LMP Unknown)   SpO2 94%   BMI 23.34 kg/m²     Physical Exam  GEN: alert and oriented. In no acute distress.   HEENT: moist oropharyngeal mucous membranes  CV:RRR  PULM: clear to auscultation bilaterally  ABD: soft non tender non distended  EXT: warm well perfused, no lower extremity edema.    Labs reviewed.  Recent Labs     22  0810 22  0430 23  0848 23  0853 23  0417 23  0059 23  0656 23  1636 23  0706 23  0702 23  1939 23  0156 23  0808   ALBUMIN 4.4   < > 4.3   < > 3.6   < >  --    < > 3.9  --  4.4 4.3  --    HDL 39*  --  45  --  31*  --   --   --   --   --   --   --   --    TRIGLYCERIDE 177*  --  109  --  144  --   --   --   --   --   --   --   --    SODIUM 140   < > 138   < > 139    < > 138   < > 135 135 136 135  --    POTASSIUM 4.4   < > 4.9   < > 3.4*   < > 5.3   < > 5.7* 6.2* 6.5* 5.7* 5.6*   CHLORIDE 108   < > 108   < > 110   < > 106   < > 107 109 108 106  --    CO2 21   < > 20   < > 18*   < > 23   < > 18* 13* 16* 16*  --    BUN 22   < > 26*   < > 25*   < > 28*   < > 35* 38* 34* 33*  --    CREATININE 1.07   < > 1.00   < > 0.87   < > 1.17   < > 1.93* 1.79* 1.70* 1.48*  --    PHOSPHORUS  --    < >  --    < > 2.8   < > 2.6  2.6  --  2.8 2.8  --   --   --     < > = values in this interval not displayed.       Lab Results   Component Value Date/Time    WBC 3.9 (L) 09/23/2023 01:56 AM    RBC 4.37 09/23/2023 01:56 AM    HEMOGLOBIN 12.1 09/23/2023 01:56 AM    HEMATOCRIT 39.5 09/23/2023 01:56 AM    MCV 90.4 09/23/2023 01:56 AM    MCH 27.7 09/23/2023 01:56 AM    MCHC 30.6 (L) 09/23/2023 01:56 AM    MPV 11.2 09/23/2023 01:56 AM      Recent Labs     09/21/23  0702 09/22/23 1939 09/23/23  0156   WBC 4.0* 4.2* 3.9*   RBC 4.05* 4.33 4.37   HEMOGLOBIN 11.3* 12.2 12.1   HEMATOCRIT 37.4 38.7 39.5   MCV 92.3 89.4 90.4   MCH 27.9 28.2 27.7   MCHC 30.2* 31.5* 30.6*   RDW 51.0* 48.7 48.9   PLATELETCT 125* 122* 128*   MPV 11.0 10.6 11.2     Recent Labs     09/21/23  0702 09/22/23 1939 09/23/23  0156 09/23/23  0808   SODIUM 135 136 135  --    POTASSIUM 6.2* 6.5* 5.7* 5.6*   CHLORIDE 109 108 106  --    CO2 13* 16* 16*  --    GLUCOSE 101* 124* 73  --    BUN 38* 34* 33*  --    CREATININE 1.79* 1.70* 1.48*  --    CALCIUM 10.1 10.1 10.8*  --      URINALYSIS:  Lab Results   Component Value Date/Time    COLORURINE Yellow 09/22/2023 2235    CLARITY Clear 09/22/2023 2235    SPECGRAVITY 1.013 09/22/2023 2235    PHURINE 5.0 09/22/2023 2235    KETONES Negative 09/22/2023 2235    PROTEINURIN Negative 09/22/2023 2235    BILIRUBINUR Negative 09/22/2023 2235    UROBILU 0.2 09/22/2023 2235    NITRITE Negative 09/22/2023 2235    LEUKESTERAS Moderate (A) 09/22/2023 2235    OCCULTBLOOD Large (A) 09/22/2023 2235     [facility] [place]  Lab  Results   Component Value Date/Time    TOTPROTUR 16.0 (H) 2023      Lab Results   Component Value Date/Time    CREATININEU 81.98 2023       Imaging report(s) reviewed  CT-ABDOMEN-PELVIS WITH   Final Result      1.  Right-sided renal transplant is noted in right lower abdomen and pelvis. There is mild right hydronephrosis and ureteral stent extends from the renal pelvis into the urinary bladder.      2.  Post left nephrectomy.      3.  Atrophy of the native right kidney.            Assessment:  Tere Gallegos is a 72 y.o. female transplant who presents with acute kidney injury.  Patient admitted for further evaluation consideration of abscess development.    Acute Kidney Injury in the setting of  donor renal transplant renal Transplant placated by delayed graft function, ureteral reimplantation in 2023.    -Urinalysis demonstrates pyuria small leukocyte Estrace moderate occult blood, pyuria.  -CT Abdomen /Pelvis negative.   -Routine management of hypertension  - Routine management of diabetes  - Dose all medications for patient level of renal function  - Avoid nephrotoxic agents including contrast and NSAIDs  - Encourage adequate hydration.  - Continue to monitor renal function.  Obtain BMP, urine studies including urine protein quantification.  - Tacrolimus monitoring. Obtain trough tacrolimus level  8 am daily (one hour before planned tacrolimus administration)  - No immediate need for renal biopsy  - Continue IV meropenem for urinary tract infection.  -Obtain BK level.    2.  Hyperkalemia-continue to monitor.  IV hydration.  Treatment of nongap metabolic acidosis to facilitate transcellular shifting of potassium into cells.  Continue hydration.    3.  None anion gap metabolic acidosis -continue sodium bicarbonate 1300 twice daily.      4.  Hypertension-continue amlodipine 5 mg daily.      5.  Diabetes mellitus-on sliding scale.    6.  Persistent atrial fibrillation-  Apixaban.      Larisa Alcantar MD  Nephrology  [facility]

## 2023-09-23 NOTE — CARE PLAN
The patient is Stable - Low risk of patient condition declining or worsening    Shift Goals  Clinical Goals: vss, iv abx, check lab values  Patient Goals: rest    Progress made toward(s) clinical / shift goals:      Problem: Pain - Standard  Goal: Alleviation of pain or a reduction in pain to the patient’s comfort goal  Description: Target End Date:  Prior to discharge or change in level of care    Document on Vitals flowsheet    1.  Document pain using the appropriate pain scale per order or unit policy  2.  Educate and implement non-pharmacologic comfort measures (i.e. relaxation, distraction, massage, cold/heat therapy, etc.)  3.  Pain management medications as ordered  4.  Reassess pain after pain med administration per policy  5.  If opiods administered assess patient's response to pain medication is appropriate per POSS sedation scale  6.  Follow pain management plan developed in collaboration with patient and interdisciplinary team (including palliative care or pain specialists if applicable)  Outcome: Not Progressing       Patient is not progressing towards the following goals:      Problem: Pain - Standard  Goal: Alleviation of pain or a reduction in pain to the patient’s comfort goal  Description: Target End Date:  Prior to discharge or change in level of care    Document on Vitals flowsheet    1.  Document pain using the appropriate pain scale per order or unit policy  2.  Educate and implement non-pharmacologic comfort measures (i.e. relaxation, distraction, massage, cold/heat therapy, etc.)  3.  Pain management medications as ordered  4.  Reassess pain after pain med administration per policy  5.  If opiods administered assess patient's response to pain medication is appropriate per POSS sedation scale  6.  Follow pain management plan developed in collaboration with patient and interdisciplinary team (including palliative care or pain specialists if applicable)  Outcome: Not Progressing     Problem:  Knowledge Deficit - Standard  Goal: Patient and family/care givers will demonstrate understanding of plan of care, disease process/condition, diagnostic tests and medications  Description: Target End Date:  1-3 days or as soon as patient condition allows    Document in Patient Education    1.  Patient and family/caregiver oriented to unit, equipment, visitation policy and means for communicating concern  2.  Complete/review Learning Assessment  3.  Assess knowledge level of disease process/condition, treatment plan, diagnostic tests and medications  4.  Explain disease process/condition, treatment plan, diagnostic tests and medications  Outcome: Not Progressing

## 2023-09-23 NOTE — ED PROVIDER NOTES
ED Provider Note    CHIEF COMPLAINT  Chief Complaint   Patient presents with    Painful Urination     For 3 weeks. Denies blood in urine, abdominal pain.       Flank Pain     Right flank pain. Kidney transplant recipient 2022.       EXTERNAL RECORDS REVIEWED  [facility], endocrinology, internal medicine, cardiology    HPI/ROS  LIMITATION TO HISTORY   Select: Language banish,  Used   OUTSIDE HISTORIAN(S):  Dr. Jian Carranza Kenyetta Gallegos is a 72 y.o. female who presents here for evaluation of dysuria, urgency and frequency.  Patient states she also has some flank pain over the last few days as well.  She was at the nephrology office, and was seen by Dr. Villar, who then sent the patient over here.  It was noted she has an SASHA, elevated K, and dysuria.  The pt has no cp, no sob, no vomiting.     PAST MEDICAL HISTORY   has a past medical history of Acquired hypothyroidism (05/04/2020), CAD (coronary artery disease), Chronic diastolic heart failure (HCC) (05/04/2020), Coronary artery disease due to lipid rich plaque, Dental disorder, Diabetes ([facility]), ESRD (end stage renal disease) on dialysis ([facility]) (05/04/2020), Hemodialysis patient ([facility]), Hyperlipidemia, Hypertension, Kidney transplant candidate, Kidney transplant recipient (10/31/2022), Presence of drug-eluting stent in right coronary artery, QT prolongation (01/22/2020), RLS (restless legs syndrome) (08/05/2016), and Transaminitis (12/22/2018).    SURGICAL HISTORY   has a past surgical history that includes recovery (08/16/2016); other abdominal surgery; other (Left, 2014); zzz cardiac cath (08/16/2016); zzz cardiac cath (09/07/2016); other abdominal surgery (Right, 10/31/2022); and ureteral reimplantation (08/07/2023).    FAMILY HISTORY  Family History   Problem Relation Age of Onset    Diabetes Sister     Other Sister         liver disease    Diabetes Brother     Heart Disease Neg Hx        SOCIAL HISTORY  Social History      Tobacco Use    Smoking status: Never    Smokeless tobacco: Never   Vaping Use    Vaping Use: Never used   Substance and Sexual Activity    Alcohol use: No     Alcohol/week: 0.0 oz    Drug use: No    Sexual activity: Never       CURRENT MEDICATIONS  Home Medications       Reviewed by Yao Carter R.N. (Registered Nurse) on 09/22/23 at 1749  Med List Status: Partial     Medication Last Dose Status   amiodarone (CORDARONE) 200 MG Tab  Active   amLODIPine (NORVASC) 5 MG Tab  Active   apixaban (ELIQUIS) 2.5mg Tab  Active   atorvastatin (LIPITOR) 20 MG Tab  Active   Blood Glucose Meter Kit  Active   ciprofloxacin (CIPRO) 500 MG Tab  Active   furosemide (LASIX) 80 MG Tab  Active   hydrOXYzine HCl (ATARAX) 25 MG Tab  Active   insulin aspart (NOVOLOG FLEXPEN) 100 UNIT/ML injection PEN  Active   insulin glargine (LANTUS SOLOSTAR) 100 UNIT/ML Solution Pen-injector injection  Active   Lancets  Active   levothyroxine (SYNTHROID) 75 MCG Tab  Active   losartan (COZAAR) 100 MG Tab  Active   mycophenolate (CELLCEPT) 250 MG Cap  Active   pravastatin (PRAVACHOL) 40 MG tablet  Active   predniSONE (DELTASONE) 5 MG Tab  Active   sodium bicarbonate (SODIUM BICARBONATE) 650 MG Tab  Active   tacrolimus (PROGRAF) 1 MG Cap  Active                    ALLERGIES  No Known Allergies    PHYSICAL EXAM  VITAL SIGNS: BP (!) 162/54   Pulse (!) 55   Temp 35.9 °C (96.7 °F) (Temporal)   Resp 16   Ht 1.524 m (5')   Wt 55.9 kg (123 lb 3.8 oz)   LMP  (LMP Unknown)   SpO2 98%   BMI 24.07 kg/m²    Constitutional: Well developed, well nourished.  Mild acute distress.  HEENT: Normocephalic, atraumatic. Posterior pharynx clear and moist.  Eyes:  EOMI. Normal sclera.  Neck: Supple, Full range of motion, nontender.  Chest/Pulmonary: clear to ausculation. Symmetrical expansion.   Cardio: Regular rate and rhythm with no murmur.   Abdomen: Soft, nontender. No peritoneal signs. No guarding. No palpable masses.  Back: Mild right CVA tenderness, nontender  midline, no step offs.  Musculoskeletal: No deformity, no edema, neurovascular intact.   Neuro: Clear speech, appropriate, cooperative, cranial nerves II-XII grossly intact.  Psych: Normal mood and affect      DIAGNOSTIC STUDIES / PROCEDURES  Results for orders placed or performed during the hospital encounter of 09/22/23   Urinalysis, Culture if Indicated    Specimen: Urine, Clean Catch   Result Value Ref Range    Color Yellow     Character Clear     Specific Gravity 1.007 <1.035    Ph 5.0 5.0 - 8.0    Glucose Negative Negative mg/dL    Ketones Negative Negative mg/dL    Protein Negative Negative mg/dL    Bilirubin Negative Negative    Urobilinogen, Urine 0.2 Negative    Nitrite Negative Negative    Leukocyte Esterase Small (A) Negative    Occult Blood Moderate (A) Negative    Micro Urine Req Microscopic    URINE MICROSCOPIC (W/UA)   Result Value Ref Range    WBC 5-10 (A) /hpf    RBC 2-5 (A) /hpf    Bacteria Negative None /hpf    Epithelial Cells Negative /hpf    Hyaline Cast 0-2 /lpf   CBC w/ Differential   Result Value Ref Range    WBC 4.2 (L) 4.8 - 10.8 K/uL    RBC 4.33 4.20 - 5.40 M/uL    Hemoglobin 12.2 12.0 - 16.0 g/dL    Hematocrit 38.7 37.0 - 47.0 %    MCV 89.4 81.4 - 97.8 fL    MCH 28.2 27.0 - 33.0 pg    MCHC 31.5 (L) 32.2 - 35.5 g/dL    RDW 48.7 35.9 - 50.0 fL    Platelet Count 122 (L) 164 - 446 K/uL    MPV 10.6 9.0 - 12.9 fL    Neutrophils-Polys 81.40 (H) 44.00 - 72.00 %    Lymphocytes 10.80 (L) 22.00 - 41.00 %    Monocytes 7.20 0.00 - 13.40 %    Eosinophils 0.20 0.00 - 6.90 %    Basophils 0.20 0.00 - 1.80 %    Immature Granulocytes 0.20 0.00 - 0.90 %    Nucleated RBC 0.00 0.00 - 0.20 /100 WBC    Neutrophils (Absolute) 3.39 1.82 - 7.42 K/uL    Lymphs (Absolute) 0.45 (L) 1.00 - 4.80 K/uL    Monos (Absolute) 0.30 0.00 - 0.85 K/uL    Eos (Absolute) 0.01 0.00 - 0.51 K/uL    Baso (Absolute) 0.01 0.00 - 0.12 K/uL    Immature Granulocytes (abs) 0.01 0.00 - 0.11 K/uL    NRBC (Absolute) 0.00 K/uL   Complete  Metabolic Panel (CMP)   Result Value Ref Range    Sodium 136 135 - 145 mmol/L    Potassium 6.5 (H) 3.6 - 5.5 mmol/L    Chloride 108 96 - 112 mmol/L    Co2 16 (L) 20 - 33 mmol/L    Anion Gap 12.0 7.0 - 16.0    Glucose 124 (H) 65 - 99 mg/dL    Bun 34 (H) 8 - 22 mg/dL    Creatinine 1.70 (H) 0.50 - 1.40 mg/dL    Calcium 10.1 8.5 - 10.5 mg/dL    Correct Calcium 9.8 8.5 - 10.5 mg/dL    AST(SGOT) 21 12 - 45 U/L    ALT(SGPT) 18 2 - 50 U/L    Alkaline Phosphatase 82 30 - 99 U/L    Total Bilirubin 0.4 0.1 - 1.5 mg/dL    Albumin 4.4 3.2 - 4.9 g/dL    Total Protein 7.3 6.0 - 8.2 g/dL    Globulin 2.9 1.9 - 3.5 g/dL    A-G Ratio 1.5 g/dL   ESTIMATED GFR   Result Value Ref Range    GFR (CKD-EPI) 31 (A) >60 mL/min/1.73 m 2         RADIOLOGY  I have independently interpreted the diagnostic imaging associated with this visit and am waiting the final reading from the radiologist.   My preliminary interpretation is as follows: see below  Radiologist interpretation:   CT-ABDOMEN-PELVIS WITH   Final Result      1.  Right-sided renal transplant is noted in right lower abdomen and pelvis. There is mild right hydronephrosis and ureteral stent extends from the renal pelvis into the urinary bladder.      2.  Post left nephrectomy.      3.  Atrophy of the native right kidney.            COURSE & MEDICAL DECISION MAKING    Admit to hospital service    INITIAL ASSESSMENT, COURSE AND PLAN  Care Narrative: This is a 72-year-old female here for evaluation of SASHA, UTI, and hyperkalemia.  Patient is a known kidney transplant patient, who was sent over by nephrology to have a CT of the abdomen pelvis done.  I spoke to Dr. Villar, who states that if the patient has an elevated potassium, he would give her Lasix 80 mg IV.  In addition if her creatinine is less than 2, and she is okay for CT scan with IV contrast.  11:08 PM  I spoke with Apollo from pharmacy, he recommends meropenum, and he will renally dose.      CRITICAL CARE  The very real possibility of  a deterioration of this patient's condition required the highest level of my preparedness for sudden, emergent intervention.  I provided critical care services, which included medication orders, frequent reevaluations of the patient's condition and response to treatment, ordering and reviewing test results, and discussing the case with various consultants.  The critical care time associated with the care of the patient was 40 minutes. Review chart for interventions. This time is exclusive of any other billable procedures.     Pt admitted to Dr Rodriguez      DISPOSITION AND DISCUSSIONS  I have discussed management of the patient with the following physicians and BETTE's:  pharmacy , hospitalist         FINAL DIAGNOSIS  1. Acute UTI    2. SASHA (acute kidney injury) (HCC)    3.      Critical care time 40 minutes.        Electronically signed by: Orville Tamayo D.O., 9/22/2023 8:24 PM

## 2023-09-23 NOTE — PROGRESS NOTES
Called pt's son to complete med rec and he was unable to verify pt's medications     Med rec has been updated to best if ability   [facility] have been called

## 2023-09-23 NOTE — ED NOTES
Bedside report from TRAVIS Francis  Patient resting comfortably, resp even and unlabored, NAD, VSS.  Dara in lowest position with wheels locked and rails raised for patient safety.  Call light in reach and no needs at this time.    The following precautions and monitors are in place if applicable:  Fall Risk Sign NO  Bed alarm NO  Pulse Ox YES  Oxygen NO  TELE Monitor YES  IVF infusing: NO  IVF Medications Infusing:  NO  Patient pending CT scan.    BP (!) 169/76   Pulse (!) 50   Temp 35.9 °C (96.7 °F) (Temporal)   Resp 19   Ht 1.524 m (5')   Wt 55.9 kg (123 lb 3.8 oz)   SpO2 96%

## 2023-09-23 NOTE — PROGRESS NOTES
Received patient from ER per shreee, alert and pleasant, Korean speaking and oriented x 4, able to ambulate to bathroom and tolerated well. Noted with occasional non productive cough and according to her it started the day prior too arrival. Respiration even and unlabored, on RA. Denies pain at this time. Tele on and heart rhythm and rate verified with tech. WNL v/s, heart rate in the high 40s.    Oriented to unit and the use of call light for needs/assistance and questions. 4 eyes skin check completed.    Safety precautions initiated, side rails up x 3, bed to lowest level and alarm on. Will monitor.

## 2023-09-23 NOTE — ASSESSMENT & PLAN NOTE
Patient presents with mild SASHA  Currently receiving IVF  Monitoring bmp   Cr 1.48> 1.27  Discussed with nephrology   Discussed with urology regarding stent removal, at this time Dr. Hi is recommended not to remove the stent and continue medical treatment for now since creatinine is improving

## 2023-09-24 LAB
ANION GAP SERPL CALC-SCNC: 12 MMOL/L (ref 7–16)
BUN SERPL-MCNC: 32 MG/DL (ref 8–22)
CALCIUM SERPL-MCNC: 9.4 MG/DL (ref 8.5–10.5)
CHLORIDE SERPL-SCNC: 110 MMOL/L (ref 96–112)
CO2 SERPL-SCNC: 14 MMOL/L (ref 20–33)
CREAT SERPL-MCNC: 1.27 MG/DL (ref 0.5–1.4)
EKG IMPRESSION: NORMAL
ERYTHROCYTE [DISTWIDTH] IN BLOOD BY AUTOMATED COUNT: 49.3 FL (ref 35.9–50)
GFR SERPLBLD CREATININE-BSD FMLA CKD-EPI: 44 ML/MIN/1.73 M 2
GLUCOSE BLD STRIP.AUTO-MCNC: 109 MG/DL (ref 65–99)
GLUCOSE BLD STRIP.AUTO-MCNC: 129 MG/DL (ref 65–99)
GLUCOSE BLD STRIP.AUTO-MCNC: 186 MG/DL (ref 65–99)
GLUCOSE SERPL-MCNC: 90 MG/DL (ref 65–99)
HCT VFR BLD AUTO: 34.1 % (ref 37–47)
HGB BLD-MCNC: 10.4 G/DL (ref 12–16)
MCH RBC QN AUTO: 27.8 PG (ref 27–33)
MCHC RBC AUTO-ENTMCNC: 30.5 G/DL (ref 32.2–35.5)
MCV RBC AUTO: 91.2 FL (ref 81.4–97.8)
PLATELET # BLD AUTO: 115 K/UL (ref 164–446)
PMV BLD AUTO: 11.1 FL (ref 9–12.9)
POTASSIUM SERPL-SCNC: 5.1 MMOL/L (ref 3.6–5.5)
RBC # BLD AUTO: 3.74 M/UL (ref 4.2–5.4)
SODIUM SERPL-SCNC: 136 MMOL/L (ref 135–145)
WBC # BLD AUTO: 2.8 K/UL (ref 4.8–10.8)

## 2023-09-24 PROCEDURE — 93010 ELECTROCARDIOGRAM REPORT: CPT | Performed by: INTERNAL MEDICINE

## 2023-09-24 PROCEDURE — 80048 BASIC METABOLIC PNL TOTAL CA: CPT

## 2023-09-24 PROCEDURE — A9270 NON-COVERED ITEM OR SERVICE: HCPCS | Performed by: STUDENT IN AN ORGANIZED HEALTH CARE EDUCATION/TRAINING PROGRAM

## 2023-09-24 PROCEDURE — 770020 HCHG ROOM/CARE - TELE (206)

## 2023-09-24 PROCEDURE — 36415 COLL VENOUS BLD VENIPUNCTURE: CPT

## 2023-09-24 PROCEDURE — 700111 HCHG RX REV CODE 636 W/ 250 OVERRIDE (IP): Performed by: HOSPITALIST

## 2023-09-24 PROCEDURE — A9270 NON-COVERED ITEM OR SERVICE: HCPCS | Performed by: INTERNAL MEDICINE

## 2023-09-24 PROCEDURE — 700111 HCHG RX REV CODE 636 W/ 250 OVERRIDE (IP): Performed by: INTERNAL MEDICINE

## 2023-09-24 PROCEDURE — 700105 HCHG RX REV CODE 258: Performed by: HOSPITALIST

## 2023-09-24 PROCEDURE — 80197 ASSAY OF TACROLIMUS: CPT

## 2023-09-24 PROCEDURE — 87799 DETECT AGENT NOS DNA QUANT: CPT

## 2023-09-24 PROCEDURE — 700102 HCHG RX REV CODE 250 W/ 637 OVERRIDE(OP): Performed by: INTERNAL MEDICINE

## 2023-09-24 PROCEDURE — 700111 HCHG RX REV CODE 636 W/ 250 OVERRIDE (IP): Performed by: STUDENT IN AN ORGANIZED HEALTH CARE EDUCATION/TRAINING PROGRAM

## 2023-09-24 PROCEDURE — 82962 GLUCOSE BLOOD TEST: CPT

## 2023-09-24 PROCEDURE — 99233 SBSQ HOSP IP/OBS HIGH 50: CPT | Performed by: HOSPITALIST

## 2023-09-24 PROCEDURE — 85027 COMPLETE CBC AUTOMATED: CPT

## 2023-09-24 PROCEDURE — 700102 HCHG RX REV CODE 250 W/ 637 OVERRIDE(OP): Performed by: STUDENT IN AN ORGANIZED HEALTH CARE EDUCATION/TRAINING PROGRAM

## 2023-09-24 PROCEDURE — 93005 ELECTROCARDIOGRAM TRACING: CPT | Performed by: HOSPITALIST

## 2023-09-24 RX ORDER — SODIUM BICARBONATE 650 MG/1
1300 TABLET ORAL 3 TIMES DAILY
Status: DISCONTINUED | OUTPATIENT
Start: 2023-09-24 | End: 2023-09-25 | Stop reason: HOSPADM

## 2023-09-24 RX ADMIN — APIXABAN 2.5 MG: 2.5 TABLET, FILM COATED ORAL at 17:56

## 2023-09-24 RX ADMIN — SODIUM BICARBONATE 50 MEQ: 84 INJECTION, SOLUTION INTRAVENOUS at 12:43

## 2023-09-24 RX ADMIN — AMIODARONE HYDROCHLORIDE 200 MG: 200 TABLET ORAL at 04:21

## 2023-09-24 RX ADMIN — MEROPENEM 500 MG: 500 INJECTION, POWDER, FOR SOLUTION INTRAVENOUS at 20:27

## 2023-09-24 RX ADMIN — MEROPENEM 500 MG: 500 INJECTION, POWDER, FOR SOLUTION INTRAVENOUS at 12:46

## 2023-09-24 RX ADMIN — ATORVASTATIN CALCIUM 20 MG: 20 TABLET, FILM COATED ORAL at 20:25

## 2023-09-24 RX ADMIN — SENNOSIDES AND DOCUSATE SODIUM 2 TABLET: 50; 8.6 TABLET ORAL at 17:56

## 2023-09-24 RX ADMIN — LEVOTHYROXINE SODIUM 75 MCG: 0.07 TABLET ORAL at 04:22

## 2023-09-24 RX ADMIN — SODIUM BICARBONATE 1300 MG: 650 TABLET ORAL at 20:25

## 2023-09-24 RX ADMIN — PREDNISONE 5 MG: 5 TABLET ORAL at 04:21

## 2023-09-24 RX ADMIN — AMLODIPINE BESYLATE 5 MG: 5 TABLET ORAL at 04:21

## 2023-09-24 RX ADMIN — SODIUM BICARBONATE 1300 MG: 650 TABLET ORAL at 04:21

## 2023-09-24 RX ADMIN — MYCOPHENOLATE MOFETIL 250 MG: 250 CAPSULE ORAL at 04:22

## 2023-09-24 RX ADMIN — APIXABAN 2.5 MG: 2.5 TABLET, FILM COATED ORAL at 04:21

## 2023-09-24 RX ADMIN — TACROLIMUS 2 MG: 1 CAPSULE ORAL at 04:21

## 2023-09-24 RX ADMIN — MYCOPHENOLATE MOFETIL 250 MG: 250 CAPSULE ORAL at 17:56

## 2023-09-24 RX ADMIN — TACROLIMUS 2 MG: 1 CAPSULE ORAL at 17:56

## 2023-09-24 RX ADMIN — SODIUM BICARBONATE 1300 MG: 650 TABLET ORAL at 15:15

## 2023-09-24 RX ADMIN — SENNOSIDES AND DOCUSATE SODIUM 2 TABLET: 50; 8.6 TABLET ORAL at 04:22

## 2023-09-24 ASSESSMENT — ENCOUNTER SYMPTOMS
VOMITING: 0
NAUSEA: 0
DEPRESSION: 0
HEADACHES: 0
COUGH: 0
HEARTBURN: 0
PND: 0
CLAUDICATION: 0
DOUBLE VISION: 0
PALPITATIONS: 0
WHEEZING: 0
BACK PAIN: 0
BLURRED VISION: 0
DIZZINESS: 0
CHILLS: 0
MYALGIAS: 0
HEMOPTYSIS: 0
BRUISES/BLEEDS EASILY: 0
FEVER: 0

## 2023-09-24 ASSESSMENT — FIBROSIS 4 INDEX: FIB4 SCORE: 2.93

## 2023-09-24 NOTE — PROGRESS NOTES
Assumed care on patient, resting in bed and respiration even and unlabored, very pleasant. Denied pain. No needs/concerns voiced at this time. Tele on and heart rhythm and rate checked.     Safety measures are side rails up x 2, bed to lowest level, alarm on. Instructed to us call light at all times for assistance and she verbalized understanding.     Plan of care : WNL v/s, check lab values, monitor I and O, maintain safety.

## 2023-09-24 NOTE — CARE PLAN
The patient is Stable - Low risk of patient condition declining or worsening    Shift Goals  Clinical Goals: Hemodynamically stable  Patient Goals: comfort/rest    Progress made toward(s) clinical / shift goals:        Problem: Pain - Standard  Goal: Alleviation of pain or a reduction in pain to the patient’s comfort goal  Outcome: Progressing     Problem: Knowledge Deficit - Standard  Goal: Patient and family/care givers will demonstrate understanding of plan of care, disease process/condition, diagnostic tests and medications  Outcome: Progressing       Patient is not progressing towards the following goals:

## 2023-09-24 NOTE — PROGRESS NOTES
NEPHROLOGY HISTORY AND PHYSICAL CONSULT NOTE           HPI:  Tere Gallegos is a 72 y.o.  donor renal transplant in 2022, coronary artery disease, diabetes status post drug-eluting stent placement  who presents with acute kidney injury, urinary tract infection    -Subjective-remains on meropenem.  Feels much improved from time of admission.        [unfilled]     No Known Allergies    ROS    /49   Pulse (!) 54   Temp 36.7 °C (98.1 °F) (Temporal)   Resp 16   Ht 1.524 m (5')   Wt 56.2 kg (123 lb 14.4 oz)   LMP  (LMP Unknown)   SpO2 95%   BMI 24.20 kg/m²     Physical Exam  GEN: alert and oriented. In no acute distress.  Elderly woman.  HEENT: moist oropharyngeal mucous membranes  CV:RRR  PULM: clear to auscultation bilaterally  ABD: soft non tender non distended  EXT: warm well perfused, no lower extremity edema.    Labs reviewed.  Recent Labs     22  0810 22  0430 23  0848 23  0853 23  0417 23  0059 23  0656 23  1636 23  0706 23  0702 23  1939 23  0156 23  0808 23  0213   ALBUMIN 4.4   < > 4.3   < > 3.6   < >  --    < > 3.9  --  4.4 4.3  --   --    HDL 39*  --  45  --  31*  --   --   --   --   --   --   --   --   --    TRIGLYCERIDE 177*  --  109  --  144  --   --   --   --   --   --   --   --   --    SODIUM 140   < > 138   < > 139   < > 138   < > 135 135 136 135  --  136   POTASSIUM 4.4   < > 4.9   < > 3.4*   < > 5.3   < > 5.7* 6.2* 6.5* 5.7* 5.6* 5.1   CHLORIDE 108   < > 108   < > 110   < > 106   < > 107 109 108 106  --  110   CO2 21   < > 20   < > 18*   < > 23   < > 18* 13* 16* 16*  --  14*   BUN 22   < > 26*   < > 25*   < > 28*   < > 35* 38* 34* 33*  --  32*   CREATININE 1.07   < > 1.00   < > 0.87   < > 1.17   < > 1.93* 1.79* 1.70* 1.48*  --  1.27   PHOSPHORUS  --    < >  --    < > 2.8   < > 2.6  2.6  --  2.8 2.8  --   --   --   --     < > = values in this interval not displayed.       Lab  Results   Component Value Date/Time    WBC 2.8 (L) 09/24/2023 02:13 AM    RBC 3.74 (L) 09/24/2023 02:13 AM    HEMOGLOBIN 10.4 (L) 09/24/2023 02:13 AM    HEMATOCRIT 34.1 (L) 09/24/2023 02:13 AM    MCV 91.2 09/24/2023 02:13 AM    MCH 27.8 09/24/2023 02:13 AM    MCHC 30.5 (L) 09/24/2023 02:13 AM    MPV 11.1 09/24/2023 02:13 AM      Recent Labs     09/22/23 1939 09/23/23  0156 09/24/23 0213   WBC 4.2* 3.9* 2.8*   RBC 4.33 4.37 3.74*   HEMOGLOBIN 12.2 12.1 10.4*   HEMATOCRIT 38.7 39.5 34.1*   MCV 89.4 90.4 91.2   MCH 28.2 27.7 27.8   MCHC 31.5* 30.6* 30.5*   RDW 48.7 48.9 49.3   PLATELETCT 122* 128* 115*   MPV 10.6 11.2 11.1     Recent Labs     09/22/23 1939 09/23/23 0156 09/23/23  0808 09/24/23 0213   SODIUM 136 135  --  136   POTASSIUM 6.5* 5.7* 5.6* 5.1   CHLORIDE 108 106  --  110   CO2 16* 16*  --  14*   GLUCOSE 124* 73  --  90   BUN 34* 33*  --  32*   CREATININE 1.70* 1.48*  --  1.27   CALCIUM 10.1 10.8*  --  9.4     URINALYSIS:  Lab Results   Component Value Date/Time    COLORURINE Yellow 09/22/2023 2235    CLARITY Clear 09/22/2023 2235    SPECGRAVITY 1.013 09/22/2023 2235    PHURINE 5.0 09/22/2023 2235    KETONES Negative 09/22/2023 2235    PROTEINURIN Negative 09/22/2023 2235    BILIRUBINUR Negative 09/22/2023 2235    UROBILU 0.2 09/22/2023 2235    NITRITE Negative 09/22/2023 2235    LEUKESTERAS Moderate (A) 09/22/2023 2235    OCCULTBLOOD Large (A) 09/22/2023 2235     [facility]  Lab Results   Component Value Date/Time    TOTPROTUR 16.0 (H) 09/14/2023 0707      Lab Results   Component Value Date/Time    CREATININEU 81.98 09/14/2023 0707       Imaging report(s) reviewed  CT-ABDOMEN-PELVIS WITH   Final Result      1.  Right-sided renal transplant is noted in right lower abdomen and pelvis. There is mild right hydronephrosis and ureteral stent extends from the renal pelvis into the urinary bladder.      2.  Post left nephrectomy.      3.  Atrophy of the native right kidney.            Assessment:  Tere Kumari  El is a 72 y.o. female transplant who presents with acute kidney injury.  Patient admitted for further evaluation consideration of abscess development.   Given urinary tract infection in the setting of transplant, recommend stent removal for effective source control.     Acute Kidney Injury in the setting of  donor renal transplant renal Transplant placated by delayed graft function, ureteral reimplantation in 2023.     -Urinalysis demonstrates pyuria small leukocyte Estrace moderate occult blood, pyuria.  -CT Abdomen /Pelvis negative.   -Routine management of hypertension  - Routine management of diabetes  - Dose all medications for patient level of renal function  - Avoid nephrotoxic agents including contrast and NSAIDs  - Encourage adequate hydration.  - Continue to monitor renal function.  Obtain BMP, urine studies including urine protein quantification.  - Tacrolimus monitoring. Obtain trough tacrolimus level  8 am daily (one hour before planned tacrolimus administration)  -Continue immunosuppression tacrolimus 2 mg twice daily, CellCept to 50 mg twice daily, prednisone 5 mg.  No immediate need to hold CellCept given improvement in medical picture.  - No immediate need for renal biopsy  - Continue IV meropenem for urinary tract infection.  -Await BK level  - Obtain Urology consult for ureteral stent removal.     2. Urinary Tract Infection -follow urine culture and remainder of infectious work-up.  Meropenem for empiric coverage of ESBL. Obtain Infectious Disease consult for further recommendations of    3.  Hyperkalemia-continue to monitor.  IV hydration.  Treatment of nongap metabolic acidosis to facilitate transcellular shifting of potassium into cells.  Continue hydration.     4..  None anion gap metabolic acidosis -recommend 1 amp of 50 mEq sodium bicarbonate.  Advance p.o. sodium bicarbonate to 1300 mg 3 times daily.        4.  Hypertension-continue amlodipine 5 mg daily.      5.   Diabetes mellitus-on sliding scale.     6.  Persistent atrial fibrillation- Apixaban.     Plan of care discussed with Dr. Montoya  To be discussed with primary nephrologist Dr. Jian Alcantar MD  Nephrology  [facility]

## 2023-09-24 NOTE — CARE PLAN
The patient is Stable - Low risk of patient condition declining or worsening    Shift Goals  Clinical Goals: vss, iv abx, check lab values  Patient Goals: feel better    Progress made toward(s) clinical / shift goals:      Problem: Pain - Standard  Goal: Alleviation of pain or a reduction in pain to the patient’s comfort goal  Description: Target End Date:  Prior to discharge or change in level of care    Document on Vitals flowsheet    1.  Document pain using the appropriate pain scale per order or unit policy  2.  Educate and implement non-pharmacologic comfort measures (i.e. relaxation, distraction, massage, cold/heat therapy, etc.)  3.  Pain management medications as ordered  4.  Reassess pain after pain med administration per policy  5.  If opiods administered assess patient's response to pain medication is appropriate per POSS sedation scale  6.  Follow pain management plan developed in collaboration with patient and interdisciplinary team (including palliative care or pain specialists if applicable)  Outcome: Progressing       Patient is not progressing towards the following goals:    Vss, iv abx.

## 2023-09-25 ENCOUNTER — TELEPHONE (OUTPATIENT)
Dept: NEPHROLOGY | Facility: MEDICAL CENTER | Age: 73
End: 2023-09-25
Payer: MEDICARE

## 2023-09-25 VITALS
HEIGHT: 60 IN | RESPIRATION RATE: 14 BRPM | OXYGEN SATURATION: 94 % | HEART RATE: 60 BPM | SYSTOLIC BLOOD PRESSURE: 153 MMHG | TEMPERATURE: 97.9 F | WEIGHT: 123.9 LBS | DIASTOLIC BLOOD PRESSURE: 53 MMHG | BODY MASS INDEX: 24.32 KG/M2

## 2023-09-25 DIAGNOSIS — Z16.12 INFECTION DUE TO ESBL-PRODUCING ESCHERICHIA COLI: ICD-10-CM

## 2023-09-25 DIAGNOSIS — Z94.0 KIDNEY TRANSPLANT RECIPIENT: Chronic | ICD-10-CM

## 2023-09-25 DIAGNOSIS — A49.8 INFECTION DUE TO ESBL-PRODUCING ESCHERICHIA COLI: ICD-10-CM

## 2023-09-25 PROBLEM — N39.0 UTI (URINARY TRACT INFECTION): Status: RESOLVED | Noted: 2023-09-08 | Resolved: 2023-09-25

## 2023-09-25 PROBLEM — I48.91 ATRIAL FIBRILLATION WITH RVR (HCC): Status: RESOLVED | Noted: 2023-09-04 | Resolved: 2023-09-25

## 2023-09-25 LAB
ANION GAP SERPL CALC-SCNC: 12 MMOL/L (ref 7–16)
BUN SERPL-MCNC: 31 MG/DL (ref 8–22)
CALCIUM SERPL-MCNC: 9.4 MG/DL (ref 8.5–10.5)
CHLORIDE SERPL-SCNC: 108 MMOL/L (ref 96–112)
CO2 SERPL-SCNC: 18 MMOL/L (ref 20–33)
CREAT SERPL-MCNC: 1.14 MG/DL (ref 0.5–1.4)
ERYTHROCYTE [DISTWIDTH] IN BLOOD BY AUTOMATED COUNT: 48 FL (ref 35.9–50)
GFR SERPLBLD CREATININE-BSD FMLA CKD-EPI: 51 ML/MIN/1.73 M 2
GLUCOSE BLD STRIP.AUTO-MCNC: 155 MG/DL (ref 65–99)
GLUCOSE BLD STRIP.AUTO-MCNC: 162 MG/DL (ref 65–99)
GLUCOSE BLD STRIP.AUTO-MCNC: 213 MG/DL (ref 65–99)
GLUCOSE SERPL-MCNC: 81 MG/DL (ref 65–99)
HCT VFR BLD AUTO: 35.9 % (ref 37–47)
HGB BLD-MCNC: 11.3 G/DL (ref 12–16)
MCH RBC QN AUTO: 27.8 PG (ref 27–33)
MCHC RBC AUTO-ENTMCNC: 31.5 G/DL (ref 32.2–35.5)
MCV RBC AUTO: 88.4 FL (ref 81.4–97.8)
PLATELET # BLD AUTO: 127 K/UL (ref 164–446)
PMV BLD AUTO: 11.4 FL (ref 9–12.9)
POTASSIUM SERPL-SCNC: 4.8 MMOL/L (ref 3.6–5.5)
RBC # BLD AUTO: 4.06 M/UL (ref 4.2–5.4)
SODIUM SERPL-SCNC: 138 MMOL/L (ref 135–145)
TACROLIMUS BLD-MCNC: 4.1 NG/ML (ref 5–20)
TACROLIMUS BLD-MCNC: 5.4 NG/ML (ref 5–20)
WBC # BLD AUTO: 4 K/UL (ref 4.8–10.8)

## 2023-09-25 PROCEDURE — 36415 COLL VENOUS BLD VENIPUNCTURE: CPT

## 2023-09-25 PROCEDURE — 99223 1ST HOSP IP/OBS HIGH 75: CPT | Performed by: INTERNAL MEDICINE

## 2023-09-25 PROCEDURE — 85027 COMPLETE CBC AUTOMATED: CPT

## 2023-09-25 PROCEDURE — 99232 SBSQ HOSP IP/OBS MODERATE 35: CPT | Performed by: INTERNAL MEDICINE

## 2023-09-25 PROCEDURE — 700105 HCHG RX REV CODE 258: Performed by: HOSPITALIST

## 2023-09-25 PROCEDURE — 700105 HCHG RX REV CODE 258: Performed by: INTERNAL MEDICINE

## 2023-09-25 PROCEDURE — 700102 HCHG RX REV CODE 250 W/ 637 OVERRIDE(OP)

## 2023-09-25 PROCEDURE — 99239 HOSP IP/OBS DSCHRG MGMT >30: CPT | Performed by: HOSPITALIST

## 2023-09-25 PROCEDURE — 700111 HCHG RX REV CODE 636 W/ 250 OVERRIDE (IP): Performed by: INTERNAL MEDICINE

## 2023-09-25 PROCEDURE — 80197 ASSAY OF TACROLIMUS: CPT

## 2023-09-25 PROCEDURE — A9270 NON-COVERED ITEM OR SERVICE: HCPCS | Performed by: INTERNAL MEDICINE

## 2023-09-25 PROCEDURE — 700111 HCHG RX REV CODE 636 W/ 250 OVERRIDE (IP): Performed by: STUDENT IN AN ORGANIZED HEALTH CARE EDUCATION/TRAINING PROGRAM

## 2023-09-25 PROCEDURE — 700102 HCHG RX REV CODE 250 W/ 637 OVERRIDE(OP): Performed by: INTERNAL MEDICINE

## 2023-09-25 PROCEDURE — 82962 GLUCOSE BLOOD TEST: CPT

## 2023-09-25 PROCEDURE — 80048 BASIC METABOLIC PNL TOTAL CA: CPT

## 2023-09-25 PROCEDURE — 700102 HCHG RX REV CODE 250 W/ 637 OVERRIDE(OP): Performed by: STUDENT IN AN ORGANIZED HEALTH CARE EDUCATION/TRAINING PROGRAM

## 2023-09-25 PROCEDURE — A9270 NON-COVERED ITEM OR SERVICE: HCPCS | Performed by: STUDENT IN AN ORGANIZED HEALTH CARE EDUCATION/TRAINING PROGRAM

## 2023-09-25 PROCEDURE — A9270 NON-COVERED ITEM OR SERVICE: HCPCS

## 2023-09-25 PROCEDURE — 700111 HCHG RX REV CODE 636 W/ 250 OVERRIDE (IP): Performed by: HOSPITALIST

## 2023-09-25 RX ORDER — LOSARTAN POTASSIUM 100 MG/1
100 TABLET ORAL DAILY
COMMUNITY
End: 2023-11-24

## 2023-09-25 RX ORDER — FUROSEMIDE 80 MG
80 TABLET ORAL DAILY
Status: ON HOLD | COMMUNITY
End: 2023-11-25

## 2023-09-25 RX ORDER — TACROLIMUS 1 MG/1
2 CAPSULE ORAL 2 TIMES DAILY
Qty: 120 CAPSULE | Refills: 0
Start: 2023-09-25 | End: 2023-11-24

## 2023-09-25 RX ORDER — TACROLIMUS 1 MG/1
2 CAPSULE ORAL EVERY MORNING
Qty: 60 CAPSULE | Refills: 3
Start: 2023-09-25 | End: 2023-09-25

## 2023-09-25 RX ORDER — CHOLECALCIFEROL (VITAMIN D3) 125 MCG
5 CAPSULE ORAL NIGHTLY
Status: DISCONTINUED | OUTPATIENT
Start: 2023-09-25 | End: 2023-09-25 | Stop reason: HOSPADM

## 2023-09-25 RX ADMIN — APIXABAN 2.5 MG: 2.5 TABLET, FILM COATED ORAL at 04:17

## 2023-09-25 RX ADMIN — MEROPENEM 500 MG: 500 INJECTION, POWDER, FOR SOLUTION INTRAVENOUS at 12:31

## 2023-09-25 RX ADMIN — Medication 5 MG: at 02:33

## 2023-09-25 RX ADMIN — SENNOSIDES AND DOCUSATE SODIUM 2 TABLET: 50; 8.6 TABLET ORAL at 04:17

## 2023-09-25 RX ADMIN — SODIUM BICARBONATE 1300 MG: 650 TABLET ORAL at 09:10

## 2023-09-25 RX ADMIN — PREDNISONE 5 MG: 5 TABLET ORAL at 04:17

## 2023-09-25 RX ADMIN — MYCOPHENOLATE MOFETIL 250 MG: 250 CAPSULE ORAL at 04:20

## 2023-09-25 RX ADMIN — MEROPENEM 500 MG: 500 INJECTION, POWDER, FOR SOLUTION INTRAVENOUS at 04:18

## 2023-09-25 RX ADMIN — LEVOTHYROXINE SODIUM 75 MCG: 0.07 TABLET ORAL at 04:17

## 2023-09-25 RX ADMIN — ACETAMINOPHEN 650 MG: 325 TABLET, FILM COATED ORAL at 04:28

## 2023-09-25 RX ADMIN — AMLODIPINE BESYLATE 5 MG: 5 TABLET ORAL at 04:23

## 2023-09-25 RX ADMIN — AMIODARONE HYDROCHLORIDE 200 MG: 200 TABLET ORAL at 04:17

## 2023-09-25 RX ADMIN — SODIUM BICARBONATE 1300 MG: 650 TABLET ORAL at 14:02

## 2023-09-25 RX ADMIN — TACROLIMUS 2 MG: 1 CAPSULE ORAL at 04:17

## 2023-09-25 ASSESSMENT — ENCOUNTER SYMPTOMS
ABDOMINAL PAIN: 0
SHORTNESS OF BREATH: 0
FEVER: 0

## 2023-09-25 NOTE — PROGRESS NOTES
Bedside report received and patient care assumed. Pt is resting in bed, A&O4, with 3/10 pain, and is on RA. Tele box on. All fall precautions are in place, belongings at bedside table.  Pt was updated on POC, no questions or concerns. Pt educated on use of call light for assistance.

## 2023-09-25 NOTE — PROGRESS NOTES
Assumed care on patient, awake and sitting on chair. On RA and breathing even and unlabored, denied pain. Tele on and heart rhythm and rate checked on monitor.    Plan of care : WNL v/s, check lab values, I and O, maintain safety.    Safety measures are : side rails up x 2, bed to lowest level and alarm on. Patient is appropriate to use call light for assistance, will monitor.

## 2023-09-25 NOTE — PROGRESS NOTES
Patient discharged home with family member. A&Ox4. IV's taken out, patient taken down via family member and hospital escort. Monitor taken off; monitor room notified. Patient belongings discharged with patient, including medications. Discharge summary reviewed with patient. RN provided education regarding follow up care, appointments, and medications. RN also provided education regarding when to call doctor and when to call 911.

## 2023-09-25 NOTE — CARE PLAN
The patient is Stable - Low risk of patient condition declining or worsening    Shift Goals  Clinical Goals: Hemodynamically stable  Patient Goals: comfort/rest    Progress made toward(s) clinical / shift goals:        Problem: Pain - Standard  Goal: Alleviation of pain or a reduction in pain to the patient’s comfort goal  Outcome: Progressing     Problem: Knowledge Deficit - Standard  Goal: Patient and family/care givers will demonstrate understanding of plan of care, disease process/condition, diagnostic tests and medications  Outcome: Progressing       Patient is not progressing towards the following goals: N/A

## 2023-09-25 NOTE — DISCHARGE PLANNING
"RN CM spoke with \"Sydnie\" with [facility]. First outpatient infusion is scheduled for tomorrow, 9/26/23 at 6pm. [facility] \"Marylin\" they can do peripheral [facility]. Attending MD and patient's son \"Manoj\" updated. Son verbalized understanding.   "

## 2023-09-25 NOTE — PROGRESS NOTES
Hospital Medicine Daily Progress Note    Date of Service  2023    Chief Complaint  Tere Gallegos is a 72 y.o. female admitted 2023 with UTI, SASHA    Hospital Course  Tere Gallegos is a 72 y.o. female with past medical history of end-stage renal disease status post  donor kidney transplant 2022, which was notable for delayed graft function requiring dialysis until mid 2022.  This is complicated by distal ureteral stricture requiring percutaneous transfer nephrostomy following a year dural stent in May 2023 and ureteral reimplantation on 2023, diabetes mellitus hypertension, permanent atrial fibrillation who presented 2023 after being sent here from nephrology.  She had some concerning labs including an SASHA, and a persistent UTI.  She has been complaining of some dysuria for a while now.  She has been taking ciprofloxacin at home, but has only had minimal improvement.  She does have some pain localized in her lower abdomen.  Patient denies any blood in her urine.  Denies any fevers or chills.  No nausea or vomiting.  Nephrology service wanted patient to be admitted as they were concerned for an intra-abdominal abscess.  CT scan was obtained, no abscesses identified.  Nephrology service would like the patient to be started on IV antibiotics.    Interval Problem Update   patient is new to me today, patient in bed, feels better, no fever or chills, urinary symptoms improved I have ordered Urine culture, continue on meropenem, monitoring for side effects from iv meropenem.    patient is resting in bed, no fever no chills, continue tolerating meropenem, monitoring for side effects, discussed with urology, nephrology, infectious disease Dr. Chapman, will continue IV antibiotics for now, check EKG to monitor QTc.     I have discussed this patient's plan of care and discharge plan at IDT rounds today with Case Management, Nursing, Nursing leadership,  and other members of the IDT team.    Consultants/Specialty  Nephrology  Urology  ID    Code Status  Full Code    Disposition  The patient is not medically cleared for discharge to home or a post-acute facility.      I have placed the appropriate orders for post-discharge needs.    Review of Systems  Review of Systems   Constitutional:  Negative for chills and fever.   Eyes:  Negative for blurred vision and double vision.   Respiratory:  Negative for cough, hemoptysis and wheezing.    Cardiovascular:  Negative for chest pain, palpitations, claudication, leg swelling and PND.   Gastrointestinal:  Negative for heartburn, nausea and vomiting.   Genitourinary:  Negative for hematuria and urgency.   Musculoskeletal:  Negative for back pain and myalgias.   Skin:  Negative for rash.   Neurological:  Negative for dizziness and headaches.   Endo/Heme/Allergies:  Does not bruise/bleed easily.   Psychiatric/Behavioral:  Negative for depression.         Physical Exam  Temp:  [36.3 °C (97.3 °F)-36.7 °C (98.1 °F)] 36.6 °C (97.9 °F)  Pulse:  [51-62] 62  Resp:  [16-17] 17  BP: (128-146)/(41-57) 137/53  SpO2:  [95 %-97 %] 96 %    Physical Exam  Vitals and nursing note reviewed.   Constitutional:       Appearance: Normal appearance. She is ill-appearing.   HENT:      Head: Normocephalic.      Mouth/Throat:      Mouth: Mucous membranes are moist.      Pharynx: Oropharynx is clear. No oropharyngeal exudate or posterior oropharyngeal erythema.   Eyes:      General: No scleral icterus.        Right eye: No discharge.         Left eye: No discharge.      Extraocular Movements: Extraocular movements intact.      Conjunctiva/sclera: Conjunctivae normal.   Cardiovascular:      Rate and Rhythm: Regular rhythm. Bradycardia present.      Pulses: Normal pulses.      Heart sounds: Normal heart sounds.   Pulmonary:      Effort: Pulmonary effort is normal. No respiratory distress.      Breath sounds: Normal breath sounds.   Abdominal:      General:  Bowel sounds are normal. There is no distension.      Tenderness: There is no abdominal tenderness. There is no guarding.   Musculoskeletal:         General: Normal range of motion.      Cervical back: Normal range of motion and neck supple.      Right lower leg: No edema.      Left lower leg: No edema.   Skin:     General: Skin is warm and dry.      Capillary Refill: Capillary refill takes less than 2 seconds.      Coloration: Skin is not jaundiced.   Neurological:      General: No focal deficit present.      Mental Status: She is alert and oriented to person, place, and time.      Cranial Nerves: No cranial nerve deficit.   Psychiatric:         Mood and Affect: Mood normal.         Behavior: Behavior normal.         Fluids    Intake/Output Summary (Last 24 hours) at 9/24/2023 1728  Last data filed at 9/24/2023 1400  Gross per 24 hour   Intake 820 ml   Output --   Net 820 ml       Laboratory  Recent Labs     09/22/23 1939 09/23/23 0156 09/24/23  0213   WBC 4.2* 3.9* 2.8*   RBC 4.33 4.37 3.74*   HEMOGLOBIN 12.2 12.1 10.4*   HEMATOCRIT 38.7 39.5 34.1*   MCV 89.4 90.4 91.2   MCH 28.2 27.7 27.8   MCHC 31.5* 30.6* 30.5*   RDW 48.7 48.9 49.3   PLATELETCT 122* 128* 115*   MPV 10.6 11.2 11.1       Recent Labs     09/22/23 1939 09/23/23 0156 09/23/23  0808 09/24/23  0213   SODIUM 136 135  --  136   POTASSIUM 6.5* 5.7* 5.6* 5.1   CHLORIDE 108 106  --  110   CO2 16* 16*  --  14*   GLUCOSE 124* 73  --  90   BUN 34* 33*  --  32*   CREATININE 1.70* 1.48*  --  1.27   CALCIUM 10.1 10.8*  --  9.4                     Imaging  CT-ABDOMEN-PELVIS WITH   Final Result      1.  Right-sided renal transplant is noted in right lower abdomen and pelvis. There is mild right hydronephrosis and ureteral stent extends from the renal pelvis into the urinary bladder.      2.  Post left nephrectomy.      3.  Atrophy of the native right kidney.             Assessment/Plan  * UTI (urinary tract infection)  Assessment & Plan  Patient presents with  ongoing UTI. She was treated with Bactrim as outpatient without improvement. Plan was for outpatient management with Cipro (as per ID) but patient did not start   Nephrology recommended admission to hospital for management with IV abx  Given history, starting patient on meropenem.   Day team to consult ID  Concern for infected ureteral stent    I have ordered urine culture  Continue iv meropenem for now  CT abd showed mild right hydronephrosis and ureteral stent extends from the renal pelvis into the urinary bladder.  Cr trending down.     Discussed with infectious disease, will continue close monitoring, I have ordered EKG to check for QTc prolongation and consideration for fluoroquinolones treatment.      Bradycardia  Assessment & Plan  Asymptomatic  Patient on amiodarone  Continue telemetry.    Metabolic acidosis  Assessment & Plan  Continue bicarb po.   Monitoring bmp.   Bicarb is 14, on p.o. bicarb.  Follow-up BMP in a.m.    Atrial fibrillation with RVR (HCC)- (present on admission)  Assessment & Plan  Currently rate controlled  Continue amiodarone and apixiban    Acute renal failure superimposed on chronic kidney disease, unspecified CKD stage, unspecified acute renal failure type (HCC)- (present on admission)  Assessment & Plan  Patient presents with mild SASHA  Currently receiving IVF  Monitoring bmp   Cr 1.48> 1.27  Discussed with nephrology   Discussed with urology regarding stent removal, at this time Dr. Hi is recommended not to remove the stent and continue medical treatment for now since creatinine is improving    Chronic anticoagulation- (present on admission)  Assessment & Plan  Continue with apixiban   Watching for bleeding.    Kidney transplant recipient- (present on admission)  Assessment & Plan  Continue monitoring creatinine  Continue transplant medication    Pancytopenia (HCC)- (present on admission)  Assessment & Plan  Due to immunosuppression due to transplant  Continue  monitoring    Acquired hypothyroidism- (present on admission)  Assessment & Plan  Continue synthroid  Most recent TSH WNL    ESRD s/p kidney transplant 10/31/22- (present on admission)  Assessment & Plan  Resume immunosuppressives   Nephrology Dr Villar as outpatient.   Monitoring.     Chronic heart failure with preserved ejection fraction (HCC)- (present on admission)  Assessment & Plan  Last echo done on 9/2023: Normal left ventricular size, thickness, systolic function, and diastolic function  Continue Losartan  Holding diuretics in the setting of SASHA     Monitoring.   No acute exacerbation.     Diabetes (HCC)- (present on admission)  Assessment & Plan  Insulin sliding scale  Hypoglycemia protocol   Continue iss.     Hyperkalemia  Assessment & Plan  K: 6.5 on admission  Received insulin and dextrose int he ED  Follow up recheck   No need for emergent dialysis at this time     Repeat K 5.6 trending down , continue monitoring .  Close monitoring potassium levels 5.1         VTE prophylaxis: on eliquis for a fib    I have performed a physical exam and reviewed and updated ROS and Plan today (9/24/2023). In review of yesterday's note (9/23/2023), there are no changes except as documented above.      My total time spent caring for the patient on the day of the encounter was 53 minutes.   This does not include time spent on separately billable procedures/tests.    Monitoring while on IV meropenem for side effects/complication  Ordered EKG and reviewed personally  Discussing with urology Dr. Hi  Discussed with infectious disease Dr. Chapman  Discussed with nephrologist Katharine

## 2023-09-25 NOTE — DISCHARGE SUMMARY
Discharge Summary    CHIEF COMPLAINT ON ADMISSION  Chief Complaint   Patient presents with    Painful Urination     For 3 weeks. Denies blood in urine, abdominal pain.       Flank Pain     Right flank pain. Kidney transplant recipient .       Reason for Admission  SENT BY MD     Admission Date  2023    CODE STATUS  Full Code    HPI & HOSPITAL COURSE    Please see original H&P and consult notes for specific information.   Tere Gallegos is a 72 y.o. female with past medical history of end-stage renal disease status post  donor kidney transplant 2022, which was notable for delayed graft function requiring dialysis until mid 2022.  This is complicated by distal ureteral stricture requiring percutaneous transfer nephrostomy following a year dural stent in May 2023 and ureteral reimplantation on 2023, diabetes mellitus hypertension, permanent atrial fibrillation who presented 2023 after being sent here from nephrology.  She had some concerning labs including an SASHA, and a persistent UTI.  She has been complaining of some dysuria for a while now.  She has been taking ciprofloxacin at home, but has only had minimal improvement.  She does have some pain localized in her lower abdomen.  Patient denies any blood in her urine.  Denies any fevers or chills.  No nausea or vomiting.  Nephrology service wanted patient to be admitted as they were concerned for an intra-abdominal abscess.  CT scan was obtained, no abscesses identified.  Nephrology service would like the patient to be started on IV antibiotics.    Patient was started on iv meropenem, patient had ct abdomen that did not show acute finding. Patient gradually improved, patient had SASHA that improved with supportive treatment, patient's symptoms improved, patient evaluated by infectious disease and urologist and nephrologist, per urologist no indication to remove stent, patient has appointment with transplant team  on 10/5/23, ID recommending to continue iv meropenem until 10/2/23, patient has outpatient infusion setup, patient will not require picc/midline [facility] ok to do peripheral line.   Patient is alert and oriented follows commands, I have discussed with patient's son who confirmed that patient has ride to [facility] daily, appointment at 6pm tomorrow.   Patient is hemodynamically stable, patient to go home today and will follow up as outpatient with urologist/transplant team/nephrologist, all questions answered      Therefore, she is discharged in good and stable condition to home with close outpatient follow-up.    The patient met 2-midnight criteria for an inpatient stay at the time of discharge.    Discharge Date  9/25/23    FOLLOW UP ITEMS POST DISCHARGE  Urologist  Transplant team  Nephrologist  ID.     DISCHARGE DIAGNOSES  Principal Problem (Resolved):    UTI (urinary tract infection) (POA: Unknown)  Active Problems:    Diabetes (HCC) (POA: Yes)      Overview: Patient is only on pioglitazone for her diabetes. She used to follow with       endocrinology but has not visited since December 2021    Chronic heart failure with preserved ejection fraction (HCC) (POA: Yes)    ESRD s/p kidney transplant 10/31/22 (Chronic) (POA: Yes)      Overview: -Dialysis MWF      -follows with nephrology    Acquired hypothyroidism (POA: Yes)    Pancytopenia (HCC) (Chronic) (POA: Yes)    Kidney transplant recipient (Chronic) (POA: Yes)    Chronic anticoagulation (POA: Yes)    Acute renal failure superimposed on chronic kidney disease, unspecified CKD stage, unspecified acute renal failure type (HCC) (POA: Yes)    Metabolic acidosis (POA: Unknown)    Bradycardia (POA: Unknown)  Resolved Problems:    Hyperkalemia (POA: Unknown)    Atrial fibrillation with RVR (HCC) (POA: Yes)      FOLLOW UP  Future Appointments   Date Time Provider Department Center   9/26/2023  4:15 PM ANGELITA Cannon None   9/26/2023  6:00 PM [facility]   9/27/2023  6:00 PM [facility]   9/28/2023  6:00 PM [facility]   9/29/2023  6:00 PM [facility]   9/30/2023  6:00 PM [facility]   10/1/2023  6:00 PM [facility]   10/2/2023  [facility]   10/25/2023 11:40 AM ANGELITA Concepcion. [place]   11/17/2023  3:00 PM [facility] None   12/8/2023  4:00 PM ANGELITA Concepcion [facility]   12/15/2023  3:30 PM Priyank Villar M.D. [facility]   12/29/2023  4:15 PM Milton Pettit M.D. [facility] None   3/20/2024  3:00 PM ANGELITA Guzmán     No follow-up provider specified.    MEDICATIONS ON DISCHARGE     Medication List        CHANGE how you take these medications        Instructions   tacrolimus 1 MG Caps  What changed: Another medication with the same name was removed. Continue taking this medication, and follow the directions you see here.  Commonly known as: Prograf   Doctor's comments: No new med  Take 2 Capsules by mouth every morning.  Dose: 2 mg            CONTINUE taking these medications        Instructions   amiodarone 200 MG Tabs  Commonly known as: Cordarone   Take 1 Tablet by mouth every day for 30 days.  Dose: 200 mg     amLODIPine 5 MG Tabs  Commonly known as: Norvasc   Take 1 Tablet by mouth every day.  Dose: 5 mg     atorvastatin 20 MG Tabs  Commonly known as: Lipitor   Take 1 Tablet by mouth every evening.  Dose: 20 mg     Eliquis 5mg Tabs  Generic drug: apixaban   Take 5 mg by mouth 2 times a day.  Dose: 5 mg     levothyroxine 75 MCG Tabs  Commonly known as: Synthroid   Take 1 Tablet by mouth every morning on an empty stomach.  Dose: 75 mcg     mycophenolate 250 MG Caps  Commonly known as: Cellcept   Take 1 Capsule by mouth 2 times a day.  Dose: 250 mg     NovoLOG FlexPen 100 UNIT/ML injection PEN  Generic drug: insulin aspart   Inject 0-12  Units under the skin 3 times a day before meals. Unspecified sliding scale.  Dose: 0-12 Units     predniSONE 5 MG Tabs  Commonly known as: Deltasone   Take 5 mg by mouth every day.  Dose: 5 mg     sodium bicarbonate 650 MG Tabs  Commonly known as: Sodium Bicarbonate   Take 2 Tablets by mouth 2 times a day.  Dose: 1,300 mg            STOP taking these medications      ciprofloxacin 500 MG Tabs  Commonly known as: Cipro     furosemide 80 MG Tabs  Commonly known as: Lasix     hydrOXYzine HCl 25 MG Tabs  Commonly known as: Atarax     Lantus SoloStar 100 UNIT/ML Sopn injection  Generic drug: insulin glargine     losartan 100 MG Tabs  Commonly known as: Cozaar     pravastatin 40 MG tablet  Commonly known as: Pravachol     sulfamethoxazole-trimethoprim 800-160 MG tablet  Commonly known as: Bactrim DS              Allergies  No Known Allergies    DIET  Orders Placed This Encounter   Procedures    Diet Order Diet: Renal (low potassium diet.)     Standing Status:   Standing     Number of Occurrences:   1     Order Specific Question:   Diet:     Answer:   Renal [8]     Comments:   low potassium diet.       ACTIVITY  As tolerated.  Weight bearing as tolerated    CONSULTATIONS  ID  Urologist  nephrologist    PROCEDURES  none    LABORATORY  Lab Results   Component Value Date    SODIUM 138 09/25/2023    POTASSIUM 4.8 09/25/2023    CHLORIDE 108 09/25/2023    CO2 18 (L) 09/25/2023    GLUCOSE 81 09/25/2023    BUN 31 (H) 09/25/2023    CREATININE 1.14 09/25/2023        Lab Results   Component Value Date    WBC 4.0 (L) 09/25/2023    HEMOGLOBIN 11.3 (L) 09/25/2023    HEMATOCRIT 35.9 (L) 09/25/2023    PLATELETCT 127 (L) 09/25/2023        Total time of the discharge process exceeds 34 minutes.

## 2023-09-25 NOTE — CARE PLAN
The patient is Stable - Low risk of patient condition declining or worsening    Shift Goals  Clinical Goals: vss, check lab values, i and o, iv abx  Patient Goals: rest    Progress made toward(s) clinical / shift goals:      Problem: Pain - Standard  Goal: Alleviation of pain or a reduction in pain to the patient’s comfort goal  Description: Target End Date:  Prior to discharge or change in level of care    Document on Vitals flowsheet    1.  Document pain using the appropriate pain scale per order or unit policy  2.  Educate and implement non-pharmacologic comfort measures (i.e. relaxation, distraction, massage, cold/heat therapy, etc.)  3.  Pain management medications as ordered  4.  Reassess pain after pain med administration per policy  5.  If opiods administered assess patient's response to pain medication is appropriate per POSS sedation scale  6.  Follow pain management plan developed in collaboration with patient and interdisciplinary team (including palliative care or pain specialists if applicable)  Outcome: Progressing       Patient is not progressing towards the following goals:

## 2023-09-25 NOTE — CONSULTS
INFECTIOUS DISEASES INPATIENT CONSULT NOTE     Date of Service: 9/25/2023    Consult Requested By: Gregg Montoya M.D.    Reason for Consultation: ESBL E. coli urinary tract infection    History of Present Illness:   Tere Gallegos is a 72 y.o. and a speaking woman with a history of coronary artery disease, type 2 diabetes mellitus, end-stage renal disease status post renal transplant in October 2022 on chronic immunosuppression complicated by ureteral stenosis requiring ureteral reimplantation on 8/7/2023, stage III CKD, atrial fibrillation and recurrent urinary tract infections with ESBL E. coli admitted 9/22/2023 secondary to chest pain, dysuria and right flank pain.  iPad  utilized as patient primarily [place]ian-speaking.  Extensive review of emergency physician notes, hospital medicine notes and consultant notes performed.  Patient states that she underwent some sort of surgery in [place] in August but she does not recall the type of surgery.  At that time, she had a Han catheter in place for approximately 8 days.  After the Han catheter was removed, she states she developed burning sensation with urination but no hematuria.  She contacted her primary care physician and was placed on a course of antibiotics without improvement.  She does not recall the name of the antibiotic.  She was then referred to her nephrologist and was placed on a 7-day course of Bactrim without any improvement.  She also had some left-sided lower back pain but no nausea, or vomiting.  On presentation, patient was found to have acute kidney injury and a urinalysis showed pyuria.  Urine culture now positive for ESBL E. coli.  She is now on meropenem improvement in her clinical symptoms.  Blood cultures on admission are negative to date.  Of note, urine culture on 9/7 and 9/14 also positive for ESBL E. coli.  Patient was reportedly on Cipro prior to admission.  Infectious disease service consulted for  antibiotic recommendations.    All other review of systems reviewed and negative except those documented above in the HPI.     PMH:   Past Medical History:   Diagnosis Date    Acquired hypothyroidism 2020    CAD (coronary artery disease)     Chronic diastolic heart failure (HCC) 2020    Coronary artery disease due to lipid rich plaque     2 Synergy NOEMI to 100% RCA stent placed    Dental disorder     partial dentures- uppers    Diabetes ([facility])     oral medication    ESRD (end stage renal disease) on dialysis ([facility]) 2020    Hemodialysis patient ([facility])     M, W, F    Hyperlipidemia     Hypertension     Kidney transplant candidate     Kidney transplant recipient 10/31/2022    Presence of drug-eluting stent in right coronary artery     QT prolongation 2020    RLS (restless legs syndrome) 2016    Transaminitis 2018       PSH:  Past Surgical History:   Procedure Laterality Date    URETERAL REIMPLANTATION  2023    transplant ureter reimplantation - [facility] [place]    OTHER ABDOMINAL SURGERY Right 10/31/2022     Donor kidney transplant - [facility]    Z CARDIAC CATH  2016    RCA stented with 2 Synergy drug-eluting stents.    RECOVERY  2016    Procedure: CATH LAB [facility] WITH POSSIBLE DR. CASTILLO;  Surgeon: Recoveryonly Surgery;  Location: SURGERY PRE-POST PROC UNIT [facility] [place];  Service:     ZZZ CARDIAC CATH  2016    100% RCA    OTHER Left 2014    left arm upper extremity fistula    OTHER ABDOMINAL SURGERY      left kidney removed due to cancer       FAMILY HX:  Family History   Problem Relation Age of Onset    Diabetes Sister     Other Sister         liver disease    Diabetes Brother     Heart Disease Neg Hx        SOCIAL HX:  Social History     Socioeconomic History    Marital status:      Spouse name: Not on file    Number of children: Not on file    Years of education: Not on file    Highest education level: Not on file   Occupational History    Not  on file   Tobacco Use    Smoking status: Never    Smokeless tobacco: Never   Vaping Use    Vaping Use: Never used   Substance and Sexual Activity    Alcohol use: No     Alcohol/week: 0.0 oz    Drug use: No    Sexual activity: Never   Other Topics Concern    Not on file   Social History Narrative    Not on file     Social Determinants of Health     Financial Resource Strain: Not on file   Food Insecurity: Not on file   Transportation Needs: Not on file   Physical Activity: Not on file   Stress: Not on file   Social Connections: Not on file   Intimate Partner Violence: Not on file   Housing Stability: Not on file     Social History     Tobacco Use   Smoking Status Never   Smokeless Tobacco Never     Social History     Substance and Sexual Activity   Alcohol Use No    Alcohol/week: 0.0 oz       Allergies/Intolerances:  No Known Allergies    History reviewed with the patient     Other Current Medications:    Current Facility-Administered Medications:     melatonin tablet 5 mg, 5 mg, Oral, Nightly, Poppy Ji A.P.R.NOctaviano, 5 mg at 09/25/23 0233    sodium bicarbonate tablet 1,300 mg, 1,300 mg, Oral, TID, Larisa Alcantar M.D., 1,300 mg at 09/25/23 0910    meropenem (Merrem) 500 mg in  mL IV-MBP, 500 mg, Intravenous, Q8HRS, Gregg Montoya M.D., Last Rate: 200 mL/hr at 09/25/23 0418, 500 mg at 09/25/23 0418    senna-docusate (Pericolace Or Senokot S) 8.6-50 MG per tablet 2 Tablet, 2 Tablet, Oral, BID, 2 Tablet at 09/25/23 0417 **AND** polyethylene glycol/lytes (Miralax) PACKET 1 Packet, 1 Packet, Oral, QDAY PRN **AND** magnesium hydroxide (Milk Of Magnesia) suspension 30 mL, 30 mL, Oral, QDAY PRN **AND** bisacodyl (Dulcolax) suppository 10 mg, 10 mg, Rectal, QDAY PRN, Vidal Rodriguez M.D.    acetaminophen (Tylenol) tablet 650 mg, 650 mg, Oral, Q6HRS PRN, Vidal Rodriguez M.D., 650 mg at 09/25/23 0428    insulin regular (HumuLIN R,NovoLIN R) injection, 1-6 Units, Subcutaneous, 4X/DAY ACHS, 1 Units at  23 **AND** POC blood glucose manual result, , , Q AC AND BEDTIME(S) **AND** NOTIFY MD and PharmD, , , Once **AND** Administer 20 grams of glucose (approximately 8 ounces of fruit juice) every 15 minutes PRN FSBG less than 70 mg/dL, , , PRN **AND** dextrose 50% (D50W) injection 25 g, 25 g, Intravenous, Q15 MIN PRN, Vidal Rodriguez M.D.    amiodarone (Cordarone) tablet 200 mg, 200 mg, Oral, DAILY, Vidal Rodriguez M.D., 200 mg at 23    amLODIPine (Norvasc) tablet 5 mg, 5 mg, Oral, DAILY, Vidal Rodriguez M.D., 5 mg at 23    apixaban (Eliquis) tablet 2.5 mg, 2.5 mg, Oral, BID, Vidal Rodriguez M.D., 2.5 mg at 23    atorvastatin (Lipitor) tablet 20 mg, 20 mg, Oral, Nightly, Vidal Rodriguez M.D., 20 mg at 23    levothyroxine (Synthroid) tablet 75 mcg, 75 mcg, Oral, AM ES, Vidal Rodriguez M.D., 75 mcg at 23    mycophenolate (Cellcept) capsule 250 mg, 250 mg, Oral, BID, Vidal Rodriguez M.D., 250 mg at 23    predniSONE (Deltasone) tablet 5 mg, 5 mg, Oral, DAILY, Vidal Rodriguez M.D., 5 mg at 23    tacrolimus (Prograf) capsule 2 mg, 2 mg, Oral, BID, Vidal Rodriguez M.D., 2 mg at 23  [unfilled]    Most Recent Vital Signs:  /61   Pulse (!) 57   Temp 36.6 °C (97.9 °F) (Temporal)   Resp 18   Ht 1.524 m (5')   Wt 56.2 kg (123 lb 14.4 oz)   LMP  (LMP Unknown)   SpO2 94%   BMI 24.20 kg/m²   Temp  Av.6 °C (97.8 °F)  Min: 35.9 °C (96.7 °F)  Max: 36.9 °C (98.5 °F)    Physical Exam:  General: well nourished, no diaphoresis, well-appearing, no acute distress  HEENT: sclera anicteric, PERRL, extraocular muscles intact, mucous membranes moist, oropharynx clear and moist, no oral lesions or exudate  Neck: supple, no lymphadenopathy  Chest: CTAB, no rales, rhonchi or wheezes, normal work of breathing.  Cardiac: Bradycardic, irregularly irregular rhythm, normal S1 S2, no murmurs, rubs or gallops  Abdomen: + bowel  sounds, soft, non-tender, non-distended, no hepatosplenomegaly, no CVA tenderness bilaterally.  Lower mid abdominal surgical scar clean.  Extremities: WWP, no edema, 2+ pedal pulses  Skin: warm and dry, no rashes or worrisome lesions  Neuro: Alert and oriented times 3, non-focal exam, speech fluent, full range of motion to bilateral upper and lower extremities  Psych: normal mood and behavior, pleasant; memory fair, normal judgement    Pertinent Lab Results:  Recent Labs     09/23/23  0156 09/24/23 0213 09/25/23 0210   WBC 3.9* 2.8* 4.0*      Recent Labs     09/23/23 0156 09/24/23 0213 09/25/23 0210   HEMOGLOBIN 12.1 10.4* 11.3*   HEMATOCRIT 39.5 34.1* 35.9*   MCV 90.4 91.2 88.4   MCH 27.7 27.8 27.8   PLATELETCT 128* 115* 127*         Recent Labs     09/23/23  0156 09/23/23  0808 09/24/23 0213 09/25/23 0210   SODIUM 135  --  136 138   POTASSIUM 5.7* 5.6* 5.1 4.8   CHLORIDE 106  --  110 108   CO2 16*  --  14* 18*   CREATININE 1.48*  --  1.27 1.14        Recent Labs     09/22/23  1939 09/23/23 0156   ALBUMIN 4.4 4.3        Pertinent Micro:  Results       Procedure Component Value Units Date/Time    URINE CULTURE-EXISTING-LESS THAN 48 HOURS [[id number]] Collected: 09/22/23 2235    Order Status: Sent Specimen: Urine, Clean Catch Updated: 09/24/23 1401    Narrative:      Indication for culture:->Patient WITHOUT an indwelling Han  catheter in place with new onset of Dysuria, Frequency,  Urgency, and/or Suprapubic pain  Release to patient->Immediate    BLOOD CULTURE [[id number]] Collected: 09/23/23 0155    Order Status: Completed Specimen: Blood from Peripheral Updated: 09/24/23 0637     Significant Indicator NEG     Source BLD     Site PERIPHERAL     Culture Result No Growth  Note: Blood cultures are incubated for 5 days and  are monitored continuously.Positive blood cultures  are called to the RN and reported as soon as  they are identified.      Narrative:      Per Hospital Policy: Only change Specimen Src: to  "\"Line\" if  specified by physician order.  Release to patient->Immediate  Right Hand    BLOOD CULTURE [[id number]] Collected: 09/23/23 0142    Order Status: Completed Specimen: Blood from Peripheral Updated: 09/24/23 0637     Significant Indicator NEG     Source BLD     Site PERIPHERAL     Culture Result No Growth  Note: Blood cultures are incubated for 5 days and  are monitored continuously.Positive blood cultures  are called to the RN and reported as soon as  they are identified.      Narrative:      Per Hospital Policy: Only change Specimen Src: to \"Line\" if  specified by physician order.  Release to patient->Immediate  No site indicated    URINE CULTURE(NEW) [[id number]]     Order Status: No result Specimen: Urine, Clean Catch     URINALYSIS [[id number]]  (Abnormal) Collected: 09/22/23 2235    Order Status: Completed Updated: 09/22/23 2252     Color Yellow     Character Clear     Specific Gravity 1.013     Ph 5.0     Glucose 500 mg/dL      Ketones Negative mg/dL      Protein Negative mg/dL      Bilirubin Negative     Urobilinogen, Urine 0.2     Nitrite Negative     Leukocyte Esterase Moderate     Occult Blood Large     Micro Urine Req Microscopic    URINALYSIS,CULTURE IF INDICATED [[id number]] Collected: 09/22/23 2100    Order Status: Canceled Specimen: Urine, Clean Catch     Urinalysis, Culture if Indicated [[id number]]  (Abnormal) Collected: 09/22/23 1759    Order Status: Completed Specimen: Urine, Clean Catch Updated: 09/22/23 1839     Color Yellow     Character Clear     Specific Gravity 1.007     Ph 5.0     Glucose Negative mg/dL      Ketones Negative mg/dL      Protein Negative mg/dL      Bilirubin Negative     Urobilinogen, Urine 0.2     Nitrite Negative     Leukocyte Esterase Small     Occult Blood Moderate     Micro Urine Req Microscopic    Narrative:      Indication for culture:->Patient WITHOUT an indwelling Han  catheter in place with new onset of Dysuria, Frequency,  Urgency, and/or Suprapubic pain      "     Blood Culture Hold   Date Value Ref Range Status   10/19/2022 Collected  Final        Studies:  CT-ABDOMEN-PELVIS WITH    Result Date: 9/22/2023 9/22/2023 10:17 PM HISTORY/REASON FOR EXAM:  Right flank pain and history of kidney transplant. TECHNIQUE/EXAM DESCRIPTION:   CT scan of the abdomen and pelvis with contrast. Contrast-enhanced helical scanning was obtained from the diaphragmatic domes through the pubic symphysis following the bolus administration of nonionic contrast without complication. 80 mL of Omnipaque 350 nonionic contrast was administered without complication. Low dose optimization technique was utilized for this CT exam including automated exposure control and adjustment of the mA and/or kV according to patient size. COMPARISON: 10/21/2022 FINDINGS: Lower Chest: Unremarkable. Liver: Normal. Spleen: Unremarkable. Pancreas: Unremarkable. Gallbladder: No calcified stones. Biliary: Nondilated. Right adrenal gland: Normal. Kidneys: Post left nephrectomy. Atrophy of the right kidney is noted. Bowel: No obstruction or acute inflammation. Lymph nodes: No adenopathy. Vasculature: Unremarkable. Peritoneum: Unremarkable without ascites. Musculoskeletal: No acute or destructive process. Pelvis: Right renal transplant is identified. There is mild right hydronephrosis. Ureteral stent extends from renal pelvis of the right lower quadrant renal transplant into the urinary bladder..     1.  Right-sided renal transplant is noted in right lower abdomen and pelvis. There is mild right hydronephrosis and ureteral stent extends from the renal pelvis into the urinary bladder. 2.  Post left nephrectomy. 3.  Atrophy of the native right kidney.    EC-ECHOCARDIOGRAM LTD W/O CONT    Result Date: 9/6/2023  Transthoracic Echo Report Echocardiography Laboratory CONCLUSIONS Normal left ventricular size, thickness, and systolic function. The left ventricular ejection fraction is visually estimated to be 65- 70%. Normal right  ventricular size and systolic function. Estimated right ventricular systolic pressure is 40 mmHg. Mild tricuspid regurgitation. Normal inferior vena cava size and inspiratory collapse. DIOR RAMIRES Exam Date:         2023                    13:20 Exam Location:     Inpatient Priority:          Routine Ordering Physician:        GOVIND KILLIAN Referring Physician: Sonographer:               Neetu Pruitt                            [facility] Age:    72     Gender:    F MRN:    [id number] :    [id number] BSA:    1.6    Ht (in):    64     Wt (lb):    125 Exam Type:     Limited Indications:     Atrial Fibrillation ICD Codes:       427.31 CPT Codes:       04911 BP:   111    /   74     HR:   55 Technical Quality: MEASUREMENTS  (Male / Female) Normal Values 2D ECHO LV Diastolic Diameter PLAX        5.3 cm                4.2 - 5.9 / 3.9 - 5.3 cm LV Systolic Diameter PLAX         2 cm                  2.1 - 4.0 cm IVS Diastolic Thickness           0.8 cm                LVPW Diastolic Thickness          0.8 cm                LVOT Diameter                     1.8 cm                Estimated LV Ejection Fraction    65 %                  LV Ejection Fraction MOD BP       75 %                  >= 55  % LV Ejection Fraction MOD 4C       82.4 %                LV Ejection Fraction MOD 2C       67.7 %                LV Ejection Fraction 4C AL        85.5 %                LV Ejection Fraction 2C AL        69.8 %                DOPPLER TR Peak Velocity                  306 cm/s              * Indicates values subject to auto-interpretation LV EF:  65    % FINDINGS Left Ventricle Normal left ventricular size, thickness, and systolic function. The left ventricular ejection fraction is visually estimated to be 65-70%. No regional wall motion abnormalities. Right Ventricle Normal right ventricular size and systolic function. Right Atrium Normal inferior vena cava size and inspiratory collapse. Left Atrium Mitral Valve Aortic  Valve Tricuspid Valve Structurally normal tricuspid valve without significant stenosis. Mild tricuspid regurgitation. Estimated right ventricular systolic pressure is 40 mmHg. Pulmonic Valve Pericardium Aorta Normal aortic root for body surface area. The ascending aorta diameter is 2.8 cm. Jose Willis MD (Electronically Signed) Final Date:     06 September 2023                 17:36    DX-CHEST-PORTABLE (1 VIEW)    Result Date: 9/4/2023 9/4/2023 4:57 PM HISTORY/REASON FOR EXAM:  Chest Pain. TECHNIQUE/EXAM DESCRIPTION AND NUMBER OF VIEWS: Single portable view of the chest. COMPARISON: July 27, 2023 FINDINGS: The cardiac silhouette is enlarged. No pulmonary infiltrates or consolidations are noted. No pleural abnormalities are noted.     Enlarged cardiac silhouette without evidence of acute disease.      IMPRESSION:   1.  Complicated urinary tract infection   2.  ESBL E. coli infection, recurrent  3.  CKD stage III  4.  Renal transplant on chronic immunosuppression  5.  History of ureteral stricture status post ureteral stent placement in August  6.  Atrial fibrillation  7.  Pancytopenia, chronic    PLAN:   Tere Gallegos is a 72 y.o. woman with a history of fibrillation, history of renal transplant in October 2022 complicated by ureteral stenosis status post ureteral implantation with ureteral stent placement in August, and recurrent urinary tract infections with ESBL E. coli admitted on 9/22/2023 secondary to dysuria and right flank pain.    -Continue IV meropenem 500 mg every 6 hours as patient clinically improving.  Will avoid Bactrim as patient had no clinical improvement prior to admission and developed acute kidney injury.  Will avoid ciprofloxacin as patient is also on amiodarone and there is increased risk of QTc prolongation  -Urine culture+ ESBL E. coli (susceptible to ciprofloxacin and Bactrim)  [facility]  -Plan a 10-day course  of IV antibiotics from 9/23 with stop date of 10/2/2023  -[facility] orders for IV ertapenem done today    Disposition: [facility] via peripheral IV.       Plan of care discussed with JARAD Montoya M.D., Dr. Villar, nephrology and Mag .  ID signing off.  Please reconsult if patient's disposition has changed and home IV antibiotic orders needed    Ashley Chapman M.D.      Please note that this dictation was created using voice recognition software. I have worked with technical experts from [facility] to optimize the interface.  I have made every reasonable attempt to correct obvious errors, but there may be errors of grammar and possibly content that I did not discover before finalizing the note.

## 2023-09-25 NOTE — CONSULTS
UROLOGY Consult Note:    Grant Lucas P.A.-C.  Date & Time note created:    9/25/2023   1:49 PM     Referring MD:  Dr. Rodriguez    Patient ID:   Name:             Tere Vargas     YOB: 1950  Age:                 72 y.o.  female   MRN:               [id number]                                                             Reason for Consult:      UTI, transplant kidney with hydronephrosis    History of Present Illness:    71 yo with ESRD s/p renal transplant at [facility]. Subsequent ureteral stricture s/p ureteral stent placement on 8/23. Admit with abdominal pain and concern for UTI. Denies flank pains. No fevers. Voiding without dysuria. Urology consulted to assist with care and stent management.     Review of Systems:      Constitutional: Denies fevers   Eyes: Denies changes in vision   Ears/Nose/Throat/Mouth: Denies nasal congestion   Cardiovascular: no chest pain   Respiratory: no shortness of breath   Gastrointestinal/Hepatic: abdominal pain   Genitourinary: Denies hematuria, dysuria or frequency  Musculoskeletal/Rheum: Denies joint pain   Skin: Denies rash  Neurological: Denies headache   Psychiatric: denies mood disorder   Endocrine: Shanna thyroid problems  Heme/Oncology/Lymph Nodes: Denies enlarged lymph nodes   All other systems were reviewed and are negative (AMA/CMS criteria)                Past Medical History:   Past Medical History:   Diagnosis Date    Acquired hypothyroidism 05/04/2020    CAD (coronary artery disease)     Chronic diastolic heart failure (HCC) 05/04/2020    Coronary artery disease due to lipid rich plaque     2 Synergy NOEMI to 100% RCA stent placed    Dental disorder     partial dentures- uppers    Diabetes ([facility])     oral medication    ESRD (end stage renal disease) on dialysis ([facility]) 05/04/2020    Hemodialysis patient ([facility])     M, W, F    Hyperlipidemia     Hypertension     Kidney transplant candidate     Kidney transplant recipient 10/31/2022    Presence of  drug-eluting stent in right coronary artery     QT prolongation 2020    RLS (restless legs syndrome) 2016    Transaminitis 2018     Active Hospital Problems    Diagnosis     Metabolic acidosis [E87.20]     Bradycardia [R00.1]     UTI (urinary tract infection) [N39.0]     Atrial fibrillation with RVR ([facility]) [I48.91]     Acute renal failure superimposed on chronic kidney disease, unspecified CKD stage, unspecified acute renal failure type ([facility]) [N17.9, N18.9]     Chronic anticoagulation [Z79.01]     Kidney transplant recipient [Z94.0]     Pancytopenia ([facility]) [D61.818]     Acquired hypothyroidism [E03.9]     Chronic heart failure with preserved ejection fraction ([facility]) [I50.32]     ESRD s/p kidney transplant 10/31/22 [N18.6]     Diabetes ([facility]) [E11.9]     Hyperkalemia [E87.5]        Past Surgical History:  Past Surgical History:   Procedure Laterality Date    URETERAL REIMPLANTATION  2023    [place] [facility] [place]    OTHER ABDOMINAL SURGERY Right 10/31/2022     Donor kidney transplant - [facility] CARDIAC CATH  2016    RCA stented with 2 Synergy drug-eluting stents.    RECOVERY  2016    Procedure: CATH LAB [facility] WITH POSSIBLE DR. CASTILLO;  Surgeon: Recoveryonly Surgery;  Location: SURGERY PRE-POST PROC UNIT [facility] [place];  Service:     ZZZ CARDIAC CATH  2016    100% RCA    OTHER Left 2014    left arm upper extremity fistula    OTHER ABDOMINAL SURGERY      left kidney removed due to cancer       Hospital Medications:    Current Facility-Administered Medications:     melatonin tablet 5 mg, 5 mg, Oral, Nightly, Poppy Ji, A.P.R.N., 5 mg at 23 0233    meropenem (Merrem) 500 mg in  mL IV-MBP, 500 mg, Intravenous, Q8HRS, Ashley Chapman M.D., Stopped at 23 1301    sodium bicarbonate tablet 1,300 mg, 1,300 mg, Oral, TID, Larisa Alcantar M.D., 1,300 mg at 23 0910    senna-docusate (Pericolace Or Senokot S) 8.6-50  MG per tablet 2 Tablet, 2 Tablet, Oral, BID, 2 Tablet at 09/25/23 0417 **AND** polyethylene glycol/lytes (Miralax) PACKET 1 Packet, 1 Packet, Oral, QDAY PRN **AND** magnesium hydroxide (Milk Of Magnesia) suspension 30 mL, 30 mL, Oral, QDAY PRN **AND** bisacodyl (Dulcolax) suppository 10 mg, 10 mg, Rectal, QDAY PRN, Vidal Rodriguez M.D.    acetaminophen (Tylenol) tablet 650 mg, 650 mg, Oral, Q6HRS PRN, Vidal Rodriguez M.D., 650 mg at 09/25/23 0428    insulin regular (HumuLIN R,NovoLIN R) injection, 1-6 Units, Subcutaneous, 4X/DAY ACHS, 1 Units at 09/25/23 1223 **AND** POC blood glucose manual result, , , Q AC AND BEDTIME(S) **AND** NOTIFY MD and PharmD, , , Once **AND** Administer 20 grams of glucose (approximately 8 ounces of fruit juice) every 15 minutes PRN FSBG less than 70 mg/dL, , , PRN **AND** dextrose 50% (D50W) injection 25 g, 25 g, Intravenous, Q15 MIN PRN, Vidal Rodriguez M.D.    amiodarone (Cordarone) tablet 200 mg, 200 mg, Oral, DAILY, Vidal Rodriguez M.D., 200 mg at 09/25/23 0417    amLODIPine (Norvasc) tablet 5 mg, 5 mg, Oral, DAILY, Vidal Rodriguez M.D., 5 mg at 09/25/23 0423    apixaban (Eliquis) tablet 2.5 mg, 2.5 mg, Oral, BID, Vidal Rodriguez M.D., 2.5 mg at 09/25/23 0417    atorvastatin (Lipitor) tablet 20 mg, 20 mg, Oral, Nightly, Vidal Rodriguez M.D., 20 mg at 09/24/23 2025    levothyroxine (Synthroid) tablet 75 mcg, 75 mcg, Oral, AM ES, Vidal Rodriguez M.D., 75 mcg at 09/25/23 0417    mycophenolate (Cellcept) capsule 250 mg, 250 mg, Oral, BID, Vidal Rodriguez M.D., 250 mg at 09/25/23 0420    predniSONE (Deltasone) tablet 5 mg, 5 mg, Oral, DAILY, Vidal Rodriguez M.D., 5 mg at 09/25/23 0417    tacrolimus (Prograf) capsule 2 mg, 2 mg, Oral, BID, Vidal Rodriguez M.D., 2 mg at 09/25/23 0417    Current Outpatient Medications:  Medications Prior to Admission   Medication Sig Dispense Refill Last Dose    apixaban (ELIQUIS) 5mg Tab Take 5 mg by mouth 2 times a day.   9/22/2023 at AM    tacrolimus  (PROGRAF) 1 MG Cap Take 2 mg by mouth 2 times a day. 2 capsules = 2 mg.   9/22/2023 at AM    sulfamethoxazole-trimethoprim (BACTRIM DS) 800-160 MG tablet Take 1 Tablet by mouth 2 times a day. 7 day course prescribed 9/8/2023. (FINISHED)   9/15/2023 at FINISHED    ciprofloxacin (CIPRO) 500 MG Tab Take 1 Tablet by mouth every day for 7 days. 7 Tablet 0 NOT YET STARTED at NOT YET STARTED    furosemide (LASIX) 80 MG Tab Take 1 Tablet by mouth every day. 90 Tablet 3 NOT YET STARTED at NOT YET STARTED    amLODIPine (NORVASC) 5 MG Tab Take 1 Tablet by mouth every day. 90 Tablet 3 9/22/2023 at AM    pravastatin (PRAVACHOL) 40 MG tablet Take 1 Tablet by mouth every evening. 30 Tablet 11 New RX at NEVER DISPENSED    sodium bicarbonate (SODIUM BICARBONATE) 650 MG Tab Take 2 Tablets by mouth 2 times a day. 120 Tablet 3 9/22/2023 at AM    amiodarone (CORDARONE) 200 MG Tab Take 1 Tablet by mouth every day for 30 days. 30 Tablet 0 UNK. at UNK.    hydrOXYzine HCl (ATARAX) 25 MG Tab TOME FERNANDO TABLETA DOS VECES AL DESMOND CUANDO SEA NECESARIO PARA LA PICAZON (Patient taking differently: Take 25 mg by mouth 2 times a day as needed for Itching.) 30 Tablet 5 UNK at UNK    mycophenolate (CELLCEPT) 250 MG Cap Take 1 Capsule by mouth 2 times a day. 20 Capsule 0 9/22/2023 at AM    levothyroxine (SYNTHROID) 75 MCG Tab Take 1 Tablet by mouth every morning on an empty stomach. 90 Tablet 4 9/22/2023 at AM    insulin glargine (LANTUS SOLOSTAR) 100 UNIT/ML Solution Pen-injector injection Inject 5 Units under the skin every evening.   UNK. at UNK.    losartan (COZAAR) 100 MG Tab Take 1 Tablet by mouth every evening. 90 Tablet 3 9/21/2023 at PM    atorvastatin (LIPITOR) 20 MG Tab Take 1 Tablet by mouth every evening. 100 Tablet 3 9/21/2023 at PM    insulin aspart (NOVOLOG FLEXPEN) 100 UNIT/ML injection PEN Inject 0-12 Units under the skin 3 times a day before meals. Unspecified sliding scale.   UNK. at UNK.    predniSONE (DELTASONE) 5 MG Tab Take 5 mg  by mouth every day.   9/22/2023 at AM       Medication Allergy:  No Known Allergies    Family History:  Family History   Problem Relation Age of Onset    Diabetes Sister     Other Sister         liver disease    Diabetes Brother     Heart Disease Neg Hx        Social History:  Social History     Socioeconomic History    Marital status:      Spouse name: Not on file    Number of children: Not on file    Years of education: Not on file    Highest education level: Not on file   Occupational History    Not on file   Tobacco Use    Smoking status: Never    Smokeless tobacco: Never   Vaping Use    Vaping Use: Never used   Substance and Sexual Activity    Alcohol use: No     Alcohol/week: 0.0 oz    Drug use: No    Sexual activity: Never   Other Topics Concern    Not on file   Social History Narrative    Not on file     Social Determinants of Health     Financial Resource Strain: Not on file   Food Insecurity: Not on file   Transportation Needs: Not on file   Physical Activity: Not on file   Stress: Not on file   Social Connections: Not on file   Intimate Partner Violence: Not on file   Housing Stability: Not on file         Physical Exam:  Vitals/ General Appearance:   Weight/BMI: Body mass index is 24.2 kg/m².  BP (!) 153/53   Pulse 60   Temp 36.6 °C (97.9 °F)   Resp 14   Ht 1.524 m (5')   Wt 56.2 kg (123 lb 14.4 oz)   SpO2 94%   Vitals:    09/24/23 2325 09/25/23 0415 09/25/23 0720 09/25/23 1132   BP: (!) 148/56 134/51 130/61 (!) 153/53   Pulse: (!) 54 (!) 48 (!) 57 60   Resp: 18 18 18 14   Temp: 36.7 °C (98.1 °F) 36.2 °C (97.2 °F) 36.6 °C (97.9 °F) 36.6 °C (97.9 °F)   TempSrc: Temporal Temporal Temporal    SpO2: 95% 93% 94% 94%   Weight:       Height:         Oxygen Therapy:  Pulse Oximetry: 94 %, O2 (LPM): 0, O2 Delivery Device: None - Room Air    Constitutional:   Well developed, Well nourished, No acute distress  HENMT:  Normocephalic, Atraumatic, Oropharynx moist mucous membranes, No oral exudates, Nose  normal.  No thyromegaly.  Eyes:  EOMI, Conjunctiva normal, No discharge.  Neck:  Normal range of motion, No cervical tenderness.  Cardiovascular:  Normal heart rate, Normal rhythm, No murmurs, No rubs, No gallops.   Extremitites with intact distal pulses, no cyanosis, or edema.  Lungs:  Normal breath sounds, breath sounds clear to auscultation bilaterally,  no crackles, no wheezing.   Abdomen: Bowel sounds normal, Soft, No tenderness, No guarding, No rebound, No masses, No hepatosplenomegaly.  : -CVAT  Skin: Warm, Dry, No erythema, No rash, no induration.  Neurologic: Alert & oriented x 3, No focal deficits noted.  Psychiatric: Affect normal, Judgment normal, Mood normal.      MDM (Data Review):     Records reviewed and summarized in current documentation    Lab Data Review:  Recent Results (from the past 24 hour(s))   EKG    Collection Time: 23  3:56 PM   Result Value Ref Range    Report       [facility]    Test Date:  2023  Pt Name:    DIOR NICHOLS    Department: [facility]  MRN:        [id number]                      Room:       T731  Gender:     Female                       Technician: KATHLEEN  :        1950                   Requested By:MIKE CARRION  Order #:    [id number]                    Reading MD: Ritesh Borjas MD    Measurements  Intervals                                Axis  Rate:       62                           P:          -16  ND:         178                          QRS:        21  QRSD:       88                           T:          151  QT:         424  QTc:        431    Interpretive Statements  Sinus rhythm  LVH with secondary repolarization abnormality  Compared to ECG 2023 00:44:07  NO SIGNIFICANT CHANGES  Electronically Signed On 2023 21:55:41 PDT by Ritesh Borjas MD     POCT glucose device results    Collection Time: 23  5:58 PM   Result Value Ref Range    POC Glucose, Blood 109 (H) 65 - 99 mg/dL   POCT glucose device  results    Collection Time: 09/24/23  8:26 PM   Result Value Ref Range    POC Glucose, Blood 186 (H) 65 - 99 mg/dL   TACROLIMUS BLOOD    Collection Time: 09/25/23  2:10 AM   Result Value Ref Range    Tacrolimius 5.4 5.0 - 20.0 ng/mL   CBC WITHOUT DIFFERENTIAL    Collection Time: 09/25/23  2:10 AM   Result Value Ref Range    WBC 4.0 (L) 4.8 - 10.8 K/uL    RBC 4.06 (L) 4.20 - 5.40 M/uL    Hemoglobin 11.3 (L) 12.0 - 16.0 g/dL    Hematocrit 35.9 (L) 37.0 - 47.0 %    MCV 88.4 81.4 - 97.8 fL    MCH 27.8 27.0 - 33.0 pg    MCHC 31.5 (L) 32.2 - 35.5 g/dL    RDW 48.0 35.9 - 50.0 fL    Platelet Count 127 (L) 164 - 446 K/uL    MPV 11.4 9.0 - 12.9 fL   Basic Metabolic Panel    Collection Time: 09/25/23  2:10 AM   Result Value Ref Range    Sodium 138 135 - 145 mmol/L    Potassium 4.8 3.6 - 5.5 mmol/L    Chloride 108 96 - 112 mmol/L    Co2 18 (L) 20 - 33 mmol/L    Glucose 81 65 - 99 mg/dL    Bun 31 (H) 8 - 22 mg/dL    Creatinine 1.14 0.50 - 1.40 mg/dL    Calcium 9.4 8.5 - 10.5 mg/dL    Anion Gap 12.0 7.0 - 16.0   ESTIMATED GFR    Collection Time: 09/25/23  2:10 AM   Result Value Ref Range    GFR (CKD-EPI) 51 (A) >60 mL/min/1.73 m 2   POCT glucose device results    Collection Time: 09/25/23  9:05 AM   Result Value Ref Range    POC Glucose, Blood 155 (H) 65 - 99 mg/dL   POCT glucose device results    Collection Time: 09/25/23 12:21 PM   Result Value Ref Range    POC Glucose, Blood 162 (H) 65 - 99 mg/dL       Imaging/Procedures Review:    Reviewed    MDM (Assessment and Plan):       A 71 yo female with ESRD s/p renal transplant who is followed by nephrologist locally but transplant team at [facility]. She had a ureteral stent placed recently due to ureteral stricture and has presented to the hospital with abdominal pain. Her stent is in good position per imaging. She is receiving Meropenem and does feel better today. She has not had fevers. Cultures are pending at this point. Renal functions are normal. Recommend treat with antibiotics  pending cultures. No need for stent manipulation as stent should remain in place with follow up with her transplant team as scheduled on 10/5/23. Thank you for this consult. Nothing else expected from a urology standpoint and we will sign off. Case discussed with Dr. Hi who has directed this plan of care.

## 2023-09-26 ENCOUNTER — OFFICE VISIT (OUTPATIENT)
Dept: CARDIOLOGY | Facility: MEDICAL CENTER | Age: 73
End: 2023-09-26
Attending: NURSE PRACTITIONER
Payer: MEDICARE

## 2023-09-26 ENCOUNTER — OUTPATIENT INFUSION SERVICES (OUTPATIENT)
Dept: ONCOLOGY | Facility: MEDICAL CENTER | Age: 73
End: 2023-09-26
Attending: INTERNAL MEDICINE
Payer: MEDICARE

## 2023-09-26 VITALS
BODY MASS INDEX: 24.63 KG/M2 | DIASTOLIC BLOOD PRESSURE: 73 MMHG | HEIGHT: 60 IN | RESPIRATION RATE: 18 BRPM | SYSTOLIC BLOOD PRESSURE: 165 MMHG | OXYGEN SATURATION: 96 % | WEIGHT: 125.44 LBS | TEMPERATURE: 97 F | HEART RATE: 52 BPM

## 2023-09-26 VITALS
HEART RATE: 60 BPM | BODY MASS INDEX: 24.54 KG/M2 | SYSTOLIC BLOOD PRESSURE: 120 MMHG | HEIGHT: 60 IN | RESPIRATION RATE: 16 BRPM | WEIGHT: 125 LBS | DIASTOLIC BLOOD PRESSURE: 60 MMHG | OXYGEN SATURATION: 97 %

## 2023-09-26 DIAGNOSIS — I48.0 PAROXYSMAL A-FIB (HCC): ICD-10-CM

## 2023-09-26 DIAGNOSIS — N18.6 ESRD (END STAGE RENAL DISEASE) (HCC): Chronic | ICD-10-CM

## 2023-09-26 DIAGNOSIS — Z16.12 INFECTION DUE TO ESBL-PRODUCING ESCHERICHIA COLI: ICD-10-CM

## 2023-09-26 DIAGNOSIS — A49.8 INFECTION DUE TO ESBL-PRODUCING ESCHERICHIA COLI: ICD-10-CM

## 2023-09-26 DIAGNOSIS — Z79.01 CHRONIC ANTICOAGULATION: ICD-10-CM

## 2023-09-26 LAB
BACTERIA UR CULT: NORMAL
BK PLASMA INTERP, QNT NAAT NL11711: DETECTED
BK PLASMA IU/ML, QNT NAAT NL11709: 246 IU/ML
BK PLASMA LOG IU/ML, QNT NAAT NL11710: 2.39 LOG IU/ML
EKG IMPRESSION: NORMAL
SIGNIFICANT IND 70042: NORMAL
SITE SITE: NORMAL
SOURCE SOURCE: NORMAL

## 2023-09-26 PROCEDURE — 700105 HCHG RX REV CODE 258: Mod: UD | Performed by: INTERNAL MEDICINE

## 2023-09-26 PROCEDURE — 93010 ELECTROCARDIOGRAM REPORT: CPT | Performed by: INTERNAL MEDICINE

## 2023-09-26 PROCEDURE — 93005 ELECTROCARDIOGRAM TRACING: CPT | Performed by: NURSE PRACTITIONER

## 2023-09-26 PROCEDURE — 96365 THER/PROPH/DIAG IV INF INIT: CPT

## 2023-09-26 PROCEDURE — 99214 OFFICE O/P EST MOD 30 MIN: CPT | Performed by: NURSE PRACTITIONER

## 2023-09-26 PROCEDURE — 700111 HCHG RX REV CODE 636 W/ 250 OVERRIDE (IP): Mod: JZ,UD | Performed by: INTERNAL MEDICINE

## 2023-09-26 PROCEDURE — 3078F DIAST BP <80 MM HG: CPT | Performed by: NURSE PRACTITIONER

## 2023-09-26 PROCEDURE — 99213 OFFICE O/P EST LOW 20 MIN: CPT | Performed by: NURSE PRACTITIONER

## 2023-09-26 PROCEDURE — 3074F SYST BP LT 130 MM HG: CPT | Performed by: NURSE PRACTITIONER

## 2023-09-26 RX ADMIN — ERTAPENEM 1000 MG: 1 INJECTION, POWDER, LYOPHILIZED, FOR SOLUTION INTRAMUSCULAR; INTRAVENOUS at 17:42

## 2023-09-26 ASSESSMENT — ENCOUNTER SYMPTOMS
SENSORY CHANGE: 0
GASTROINTESTINAL NEGATIVE: 1
PALPITATIONS: 0
DIZZINESS: 0
HALLUCINATIONS: 0
PND: 0
NERVOUS/ANXIOUS: 0
BRUISES/BLEEDS EASILY: 0
CLAUDICATION: 0
WHEEZING: 0
DEPRESSION: 0
ORTHOPNEA: 0
EYES NEGATIVE: 1
CONSTITUTIONAL NEGATIVE: 1
SHORTNESS OF BREATH: 0
NEUROLOGICAL NEGATIVE: 1
NAUSEA: 0
RESPIRATORY NEGATIVE: 1
MUSCULOSKELETAL NEGATIVE: 1

## 2023-09-26 ASSESSMENT — FIBROSIS 4 INDEX
FIB4 SCORE: 2.65
FIB4 SCORE: 2.65

## 2023-09-26 NOTE — PROGRESS NOTES
Cardiology Follow up Note    DOS: 9/26/2023  [id number]  Tere Gallegos    Chief complaint/Reason for consult: post hospital discharge    HPI: Pt is a 72 y.o. female who presents to the clinic today in follow up for hospital visit. Patient has a past medical history significant for but not limited to: ESRD S/P kidney transplant (10/31/2022), CAD s/p PCI to RCA (2016), residual nonobstructive CAD in LAD and left circumflex (2021), hypertension, type 2 diabetes, hyperlipidemia, hypothyroidism, atrial fibrillation. Patient has been in and out of hospital recently for hydronephrosis of her transplanted kidney, chest pain, and painful urination. Her troponins have been elevated and remain constant around 47 for the past 7 months. Echocardiogram on 9/6 showed normal EF and no signs of wall motion abnormalities. Blood pressure well controlled today. Patient tolerating the amiodarone and Eliquis for atrial fibrillation. Before proceeding forward with any type of aggressive strategy will reach out to patient nephrologist for their weigh in opinion on danger to her transplant as some notes have discussed complications.       Past Medical History:   Diagnosis Date    Acquired hypothyroidism 05/04/2020    CAD (coronary artery disease)     Chronic diastolic heart failure ([facility]) 05/04/2020    Coronary artery disease due to lipid rich plaque     2 Synergy NOEMI to 100% RCA stent placed    Dental disorder     partial dentures- uppers    Diabetes ([facility])     oral medication    ESRD (end stage renal disease) on dialysis ([facility]) 05/04/2020    Hemodialysis patient ([facility])     M, W, F    Hyperlipidemia     Hypertension     Kidney transplant candidate     Kidney transplant recipient 10/31/2022    Presence of drug-eluting stent in right coronary artery     QT prolongation 01/22/2020    RLS (restless legs syndrome) 08/05/2016    Transaminitis 12/22/2018       Past Surgical History:   Procedure Laterality Date    URETERAL REIMPLANTATION   2023    transplant ureter reimplantation - [facility]    OTHER ABDOMINAL SURGERY Right 10/31/2022     Donor kidney transplant - [facility] CARDIAC CATH  2016    RCA stented with 2 Synergy drug-eluting stents.    RECOVERY  2016    Procedure: CATH LAB [facility] WITH POSSIBLE DR. CASTILLO;  Surgeon: Recoveryonly Surgery;  Location: SURGERY PRE-POST PROC UNIT [facility];  Service:     [facility] CARDIAC CATH  2016    100% RCA    OTHER Left 2014    left arm upper extremity fistula    OTHER ABDOMINAL SURGERY      left kidney removed due to cancer       Social History     Socioeconomic History    Marital status:      Spouse name: Not on file    Number of children: Not on file    Years of education: Not on file    Highest education level: Not on file   Occupational History    Not on file   Tobacco Use    Smoking status: Never    Smokeless tobacco: Never   Vaping Use    Vaping Use: Never used   Substance and Sexual Activity    Alcohol use: No     Alcohol/week: 0.0 oz    Drug use: No    Sexual activity: Never   Other Topics Concern    Not on file   Social History Narrative    Not on file     Social Determinants of Health     Financial Resource Strain: Not on file   Food Insecurity: Not on file   Transportation Needs: Not on file   Physical Activity: Not on file   Stress: Not on file   Social Connections: Not on file   Intimate Partner Violence: Not on file   Housing Stability: Not on file       Family History   Problem Relation Age of Onset    Diabetes Sister     Other Sister         liver disease    Diabetes Brother     Heart Disease Neg Hx        No Known Allergies    Current Outpatient Medications   Medication Sig Dispense Refill    losartan (COZAAR) 100 MG Tab Take 100 mg by mouth every day.      apixaban (ELIQUIS) 5mg Tab Take 5 mg by mouth 2 times a day.      amLODIPine (NORVASC) 5 MG Tab Take 1 Tablet by mouth every day. 90 Tablet 3    sodium bicarbonate (SODIUM BICARBONATE) 650  MG Tab Take 2 Tablets by mouth 2 times a day. 120 Tablet 3    amiodarone (CORDARONE) 200 MG Tab Take 1 Tablet by mouth every day for 30 days. 30 Tablet 0    levothyroxine (SYNTHROID) 75 MCG Tab Take 1 Tablet by mouth every morning on an empty stomach. 90 Tablet 4    atorvastatin (LIPITOR) 20 MG Tab Take 1 Tablet by mouth every evening. 100 Tablet 3    insulin aspart (NOVOLOG FLEXPEN) 100 UNIT/ML injection PEN Inject 0-12 Units under the skin 3 times a day before meals. Unspecified sliding scale.      tacrolimus (PROGRAF) 1 MG Cap Take 2 Capsules by mouth 2 times a day. 120 Capsule 0    furosemide (LASIX) 80 MG Tab Take 80 mg by mouth every day.      mycophenolate (CELLCEPT) 250 MG Cap Take 1 Capsule by mouth 2 times a day. 20 Capsule 0    predniSONE (DELTASONE) 5 MG Tab Take 5 mg by mouth every day.       No current facility-administered medications for this visit.     Facility-Administered Medications Ordered in Other Visits   Medication Dose Route Frequency Provider Last Rate Last Admin    ertapenem (INVanz) 1,000 mg in  mL IVPB  1 g Intravenous Once Ashley Chapman M.D.           Vitals:    09/26/23 1543   BP: 120/60   Pulse: 60   Resp: 16   SpO2: 97%         Review of Systems   Constitutional: Negative.  Negative for malaise/fatigue.   HENT: Negative.     Eyes: Negative.    Respiratory: Negative.  Negative for shortness of breath and wheezing.    Cardiovascular:  Negative for chest pain, palpitations, orthopnea, claudication, leg swelling and PND.   Gastrointestinal: Negative.  Negative for nausea.   Genitourinary: Negative.    Musculoskeletal: Negative.    Skin: Negative.    Neurological: Negative.  Negative for dizziness and sensory change.   Endo/Heme/Allergies: Negative.  Does not bruise/bleed easily.   Psychiatric/Behavioral:  Negative for depression and hallucinations. The patient is not nervous/anxious.           EKG interpreted by me: Sinus arrhythmia     Physical Exam  Constitutional:        Appearance: Normal appearance.   HENT:      Head: Normocephalic.   Eyes:      Pupils: Pupils are equal, round, and reactive to light.   Neck:      Vascular: No JVD.   Cardiovascular:      Rate and Rhythm: Regular rhythm. Bradycardia present.      Pulses: Normal pulses.      Heart sounds: Normal heart sounds.   Pulmonary:      Effort: Pulmonary effort is normal.      Breath sounds: Normal breath sounds.   Abdominal:      General: Abdomen is flat.      Palpations: Abdomen is soft.   Musculoskeletal:      Cervical back: Normal range of motion.      Right lower leg: No edema.      Left lower leg: No edema.   Skin:     General: Skin is warm and dry.   Neurological:      Mental Status: She is alert and oriented to person, place, and time.   Psychiatric:         Mood and Affect: Mood normal.         Behavior: Behavior normal.          Data:  Lipids:   Lab Results   Component Value Date/Time    CHOLSTRLTOT 97 (L) 02/18/2023 04:17 AM    TRIGLYCERIDE 144 02/18/2023 04:17 AM    HDL 31 (A) 02/18/2023 04:17 AM    LDL 37 02/18/2023 04:17 AM        BMP:  Lab Results   Component Value Date/Time    SODIUM 138 09/25/2023 0210    POTASSIUM 4.8 09/25/2023 0210    CHLORIDE 108 09/25/2023 0210    CO2 18 (L) 09/25/2023 0210    GLUCOSE 81 09/25/2023 0210    BUN 31 (H) 09/25/2023 0210    CREATININE 1.14 09/25/2023 0210    CALCIUM 9.4 09/25/2023 0210    ANION 12.0 09/25/2023 0210       GFR:  Lab Results   Component Value Date/Time    IFAFRICA 13 (A) 01/26/2022 0221    IFNOTAFR 11 (A) 01/26/2022 0221        TSH:   Lab Results   Component Value Date/Time    TSHULTRASEN 2.780 09/05/2023 0748       MAGNESIUM:  Lab Results   Component Value Date/Time    MAGNESIUM 1.6 09/22/2023 1939    MAGNESIUM 1.9 09/04/2023 1832    MAGNESIUM 2.0 05/17/2023 1255        THYROXINE (T4):   Lab Results   Component Value Date/Time    FREEDIR 1.52 01/28/2021 0700        CBC:   Lab Results   Component Value Date/Time    WBC 4.0 (L) 09/25/2023 02:10 AM    RBC 4.06 (L)  09/25/2023 02:10 AM    HEMOGLOBIN 11.3 (L) 09/25/2023 02:10 AM    HEMATOCRIT 35.9 (L) 09/25/2023 02:10 AM    MCV 88.4 09/25/2023 02:10 AM    MCH 27.8 09/25/2023 02:10 AM    MCHC 31.5 (L) 09/25/2023 02:10 AM    RDW 48.0 09/25/2023 02:10 AM    PLATELETCT 127 (L) 09/25/2023 02:10 AM    MPV 11.4 09/25/2023 02:10 AM    NEUTSPOLYS 81.40 (H) 09/22/2023 07:39 PM    LYMPHOCYTES 10.80 (L) 09/22/2023 07:39 PM    MONOCYTES 7.20 09/22/2023 07:39 PM    EOSINOPHILS 0.20 09/22/2023 07:39 PM    BASOPHILS 0.20 09/22/2023 07:39 PM    IMMGRAN 0.20 09/22/2023 07:39 PM    IMMGRAN CANCELED 01/23/2018 09:47 AM    NRBC 0.00 09/22/2023 07:39 PM    NEUTS 3.39 09/22/2023 07:39 PM    NEUTS CANCELED 01/23/2018 09:47 AM    LYMPHS 0.45 (L) 09/22/2023 07:39 PM    LYMPHS CANCELED 01/23/2018 09:47 AM    MONOS 0.30 09/22/2023 07:39 PM    MONOS CANCELED 01/23/2018 09:47 AM    EOS 0.01 09/22/2023 07:39 PM    EOS CANCELED 01/23/2018 09:47 AM    BASO 0.01 09/22/2023 07:39 PM    BASO CANCELED 01/23/2018 09:47 AM    IMMGRANAB 0.01 09/22/2023 07:39 PM    IMMGRANAB CANCELED 01/23/2018 09:47 AM    NRBCAB 0.00 09/22/2023 07:39 PM        CBC w/o DIFF  Lab Results   Component Value Date/Time    WBC 4.0 (L) 09/25/2023 02:10 AM    RBC 4.06 (L) 09/25/2023 02:10 AM    HEMOGLOBIN 11.3 (L) 09/25/2023 02:10 AM    MCV 88.4 09/25/2023 02:10 AM    MCH 27.8 09/25/2023 02:10 AM    MCHC 31.5 (L) 09/25/2023 02:10 AM    RDW 48.0 09/25/2023 02:10 AM    MPV 11.4 09/25/2023 02:10 AM       LIVER:  Lab Results   Component Value Date/Time    ALKPHOSPHAT 81 09/23/2023 01:56 AM    ASTSGOT 19 09/23/2023 01:56 AM    ALTSGPT 17 09/23/2023 01:56 AM    TBILIRUBIN 0.4 09/23/2023 01:56 AM       BNP:  Lab Results   Component Value Date/Time    BNPBTYPENAT 2065 (H) 10/16/2017 06:03 PM       PT/INR:  Lab Results   Component Value Date/Time    PROTHROMBTM 13.3 07/28/2023 11:10 AM    PROTHROMBTM 13.3 07/21/2023 11:32 AM    PROTHROMBTM 15.9 (H) 05/18/2023 11:13 AM    INR 0.98 07/28/2023 11:10 AM     INR 0.97 07/21/2023 11:32 AM    INR 1.29 (H) 05/18/2023 11:13 AM             Impression/Plan:  1. Paroxysmal A-fib (HCC)  EKG      2. ESRD s/p kidney transplant 10/31/22        3. Chronic anticoagulation         - will reach out to consult with nephrology prior to any aggressive testing or treatment strategy for chest pain   - blood pressure well controlled in office   - past 7 months troponin levels have remained constant in high 40's   - no wall motion abnormalities noted on echocardiogram from 9/6.    - continue all current medications as directed   - will reach out to patient to discuss next steps after consulting with nephrology   - has follow up for longitudinal care with Dr. Pettit in December         A total of 28 minutes of time was spent on day of encounter reviewing medical record, performing history and examination, counseling, ordering medication/test/consults, collaborating with referring service, and documentation.    Jimmy Villalba AGACNP-EP  Cardiac Electrophysiology

## 2023-09-26 NOTE — PROGRESS NOTES
Nephrology Daily Progress Note    Date of Service  9/25/2023    Chief Complaint  72 y.o. female with history of HTN, history of nephrectomy, ESRD s/p DDKT 10/31/22 complicated by ureteral stricture requiring ureteral reimplantation on 8/7/23 at [facility] admitted 9/22/2023 with SASHA and E Coli UTI    Interval Problem Update  9/25 - patient feeling much better, no more pelvic pain or dysuria. Says she is urinating well. She wants to go home.     Review of Systems  Review of Systems   Constitutional:  Negative for fever.   Respiratory:  Negative for shortness of breath.    Cardiovascular:  Negative for chest pain.   Gastrointestinal:  Negative for abdominal pain.   All other systems reviewed and are negative.       Physical Exam  Temp:  [36.2 °C (97.2 °F)-36.7 °C (98.1 °F)] 36.6 °C (97.9 °F)  Pulse:  [48-60] 60  Resp:  [14-18] 14  BP: (130-153)/(51-61) 153/53  SpO2:  [93 %-95 %] 94 %    Physical Exam  Constitutional:       General: She is not in acute distress.     Appearance: She is well-developed.   HENT:      Head: Normocephalic and atraumatic.      Mouth/Throat:      Mouth: Mucous membranes are moist.      Pharynx: Oropharynx is clear. No oropharyngeal exudate.   Eyes:      General: No scleral icterus.     Extraocular Movements: Extraocular movements intact.   Neck:      Trachea: No tracheal deviation.   Cardiovascular:      Rate and Rhythm: Normal rate and regular rhythm.      Heart sounds: Normal heart sounds. No murmur heard.  Pulmonary:      Effort: Pulmonary effort is normal.      Breath sounds: No stridor. No rales.   Abdominal:      Palpations: Abdomen is soft.      Tenderness: There is no abdominal tenderness.   Musculoskeletal:         General: Normal range of motion.      Cervical back: Normal range of motion. No rigidity.      Right lower leg: No edema.      Left lower leg: No edema.   Skin:     General: Skin is warm and dry.   Neurological:      General: No focal deficit present.      Mental Status:  She is alert and oriented to person, place, and time.   Psychiatric:         Mood and Affect: Mood normal.         Behavior: Behavior normal.     Dialysis access: left arm AVF patent.     Fluids  No intake or output data in the 24 hours ending 09/25/23 2153    Laboratory  Labs reviewed, pertinent labs below.  Recent Labs     09/23/23  0156 09/24/23 0213 09/25/23 0210   WBC 3.9* 2.8* 4.0*   RBC 4.37 3.74* 4.06*   HEMOGLOBIN 12.1 10.4* 11.3*   HEMATOCRIT 39.5 34.1* 35.9*   MCV 90.4 91.2 88.4   MCH 27.7 27.8 27.8   MCHC 30.6* 30.5* 31.5*   RDW 48.9 49.3 48.0   PLATELETCT 128* 115* 127*   MPV 11.2 11.1 11.4     Recent Labs     09/23/23 0156 09/23/23  0808 09/24/23 0213 09/25/23 0210   SODIUM 135  --  136 138   POTASSIUM 5.7* 5.6* 5.1 4.8   CHLORIDE 106  --  110 108   CO2 16*  --  14* 18*   GLUCOSE 73  --  90 81   BUN 33*  --  32* 31*   CREATININE 1.48*  --  1.27 1.14   CALCIUM 10.8*  --  9.4 9.4               URINALYSIS:  Lab Results   Component Value Date/Time    COLORURINE Yellow 09/22/2023 2235    CLARITY Clear 09/22/2023 2235    SPECGRAVITY 1.013 09/22/2023 2235    PHURINE 5.0 09/22/2023 2235    KETONES Negative 09/22/2023 2235    PROTEINURIN Negative 09/22/2023 2235    BILIRUBINUR Negative 09/22/2023 2235    UROBILU 0.2 09/22/2023 2235    NITRITE Negative 09/22/2023 2235    LEUKESTERAS Moderate (A) 09/22/2023 2235    OCCULTBLOOD Large (A) 09/22/2023 2235     UPC  Lab Results   Component Value Date/Time    TOTPROTUR 16.0 (H) 09/14/2023 0707      Lab Results   Component Value Date/Time    CREATININEU 81.98 09/14/2023 0707         Imaging interpreted by radiologist. Imaging reports reviewed with pertinent findings below  CT-ABDOMEN-PELVIS WITH   Final Result      1.  Right-sided renal transplant is noted in right lower abdomen and pelvis. There is mild right hydronephrosis and ureteral stent extends from the renal pelvis into the urinary bladder.      2.  Post left nephrectomy.      3.  Atrophy of the native  right kidney.            No current facility-administered medications for this encounter.     Current Outpatient Medications   Medication Sig Dispense Refill    tacrolimus (PROGRAF) 1 MG Cap Take 2 Capsules by mouth every morning. 60 Capsule 3    apixaban (ELIQUIS) 5mg Tab Take 5 mg by mouth 2 times a day.      amLODIPine (NORVASC) 5 MG Tab Take 1 Tablet by mouth every day. 90 Tablet 3    sodium bicarbonate (SODIUM BICARBONATE) 650 MG Tab Take 2 Tablets by mouth 2 times a day. 120 Tablet 3    amiodarone (CORDARONE) 200 MG Tab Take 1 Tablet by mouth every day for 30 days. 30 Tablet 0    mycophenolate (CELLCEPT) 250 MG Cap Take 1 Capsule by mouth 2 times a day. 20 Capsule 0    levothyroxine (SYNTHROID) 75 MCG Tab Take 1 Tablet by mouth every morning on an empty stomach. 90 Tablet 4    atorvastatin (LIPITOR) 20 MG Tab Take 1 Tablet by mouth every evening. 100 Tablet 3    insulin aspart (NOVOLOG FLEXPEN) 100 UNIT/ML injection PEN Inject 0-12 Units under the skin 3 times a day before meals. Unspecified sliding scale.      predniSONE (DELTASONE) 5 MG Tab Take 5 mg by mouth every day.           Assessment/Plan  72 y.o. female with history of HTN, history of nephrectomy, ESRD s/p DDKT 10/31/22 complicated by ureteral stricture requiring ureteral reimplantation on 8/7/23 at [facility] admitted 9/22/2023 with SASHA and E Coli UTI    1. SASHA on transplant CKD3a. SASHA improved, likely from UTI. Imaging without abscess. Avoid NSAIDs and other nephrotoxins.  Check renal function panel daily while inpatient.     2. ESBL E Coli UTI, on meropenem. Appreciate ID assistance, will schedule outpatient IV antibiotics in [facility].     3. Metabolic acidosis, persistent, unclear etiology. Continue sodium bicarb 1300mg PO BID.     4. S/p ureteral reimplantation. Patient needs to follow up with urology for stent removal.     5. Hyperkalemia, resolved. Check renal function panel daily.     6. Normocytic anemia, mild. Unclear etiology. Check CBC  daily.      7. Pancytopenia, persistent, known prior to transplant. Check CBC daily.      8. Immunosuppression, maintain tacrolimus 2mg BID,  mg BID, and prednisone 5mg daily.     9. Hypertension, as hyperkalemia improved, I recommend resume losartan 100mg daily and lasix 80mg daily.     Discussed with Dr. Gregg Villar MD  Nephrology  [facility] [place] [facility]

## 2023-09-27 ENCOUNTER — OUTPATIENT INFUSION SERVICES (OUTPATIENT)
Dept: ONCOLOGY | Facility: MEDICAL CENTER | Age: 73
End: 2023-09-27
Attending: INTERNAL MEDICINE
Payer: MEDICARE

## 2023-09-27 VITALS
HEART RATE: 51 BPM | RESPIRATION RATE: 18 BRPM | OXYGEN SATURATION: 96 % | TEMPERATURE: 98 F | SYSTOLIC BLOOD PRESSURE: 149 MMHG | DIASTOLIC BLOOD PRESSURE: 57 MMHG

## 2023-09-27 DIAGNOSIS — Z16.12 INFECTION DUE TO ESBL-PRODUCING ESCHERICHIA COLI: ICD-10-CM

## 2023-09-27 DIAGNOSIS — A49.8 INFECTION DUE TO ESBL-PRODUCING ESCHERICHIA COLI: ICD-10-CM

## 2023-09-27 DIAGNOSIS — I48.0 PAROXYSMAL A-FIB (HCC): ICD-10-CM

## 2023-09-27 PROCEDURE — 700105 HCHG RX REV CODE 258: Mod: UD | Performed by: INTERNAL MEDICINE

## 2023-09-27 PROCEDURE — 700111 HCHG RX REV CODE 636 W/ 250 OVERRIDE (IP): Mod: JZ,UD | Performed by: INTERNAL MEDICINE

## 2023-09-27 PROCEDURE — 96365 THER/PROPH/DIAG IV INF INIT: CPT

## 2023-09-27 RX ORDER — APIXABAN 5 MG/1
5 TABLET, FILM COATED ORAL 2 TIMES DAILY
Qty: 180 TABLET | Refills: 1 | Status: SHIPPED | OUTPATIENT
Start: 2023-09-27 | End: 2024-03-06 | Stop reason: SDUPTHER

## 2023-09-27 RX ADMIN — ERTAPENEM 1000 MG: 1 INJECTION, POWDER, LYOPHILIZED, FOR SOLUTION INTRAMUSCULAR; INTRAVENOUS at 18:00

## 2023-09-27 NOTE — PROGRESS NOTES
Tere [facility] the [facility] for daily Invanz, daughter at side. POC reviewed, info obtained utilizing  services. IV started in R FA by Kathy ROBLES, Pt tolerated well. IV abx infused per MD order, Pt tolerated well. IV removed, tip intact/sterile gauze and coban applied. Confirmed future appointments, Pt requesting 1700 appointments, will e-mail scheduling and Tere was discharged home in no acute distress.

## 2023-09-27 NOTE — PROGRESS NOTES
Subjective:     Tere Gallegos is a 72 y.o. female who presents for Hospital Follow-up.    HPI:   Recently hospitalized 9/4/23-9/6/23 for A-fib with RVR, history of P afib.  Cardiology recommended  continue amiodarone for 7 days and follow up as outpatient . Metoprolol was held for bradycardia .  Evaluated by Nephrology . Tacrolimus dose adjusted 2 mg BID.     Echo unremarkable.    Today she is feeling well however she reports painful urination, burning pain, pelvic pain. She did a UA yesterday in the lab which was ordered by her nephrologist and it was positive for moderate bacteria, large leuks, pos nitrites, small blood, urine culture is pending.       Current medicines (including reconciliation performed today)  Current Outpatient Medications   Medication Sig Dispense Refill    amLODIPine (NORVASC) 5 MG Tab Take 1 Tablet by mouth every day. 90 Tablet 3    tacrolimus (PROGRAF) 1 MG Cap Take 2 Capsules by mouth 2 times a day. 120 Capsule 0    losartan (COZAAR) 100 MG Tab Take 100 mg by mouth every day.      furosemide (LASIX) 80 MG Tab Take 80 mg by mouth every day. (Patient not taking: Reported on 9/26/2023)      apixaban (ELIQUIS) 5mg Tab Take 5 mg by mouth 2 times a day.      sodium bicarbonate (SODIUM BICARBONATE) 650 MG Tab Take 2 Tablets by mouth 2 times a day. 120 Tablet 3    amiodarone (CORDARONE) 200 MG Tab Take 1 Tablet by mouth every day for 30 days. 30 Tablet 0    mycophenolate (CELLCEPT) 250 MG Cap Take 1 Capsule by mouth 2 times a day. 20 Capsule 0    levothyroxine (SYNTHROID) 75 MCG Tab Take 1 Tablet by mouth every morning on an empty stomach. 90 Tablet 4    atorvastatin (LIPITOR) 20 MG Tab Take 1 Tablet by mouth every evening. 100 Tablet 3    insulin aspart (NOVOLOG FLEXPEN) 100 UNIT/ML injection PEN Inject 0-12 Units under the skin 3 times a day before meals. Unspecified sliding scale.      predniSONE (DELTASONE) 5 MG Tab Take 5 mg by mouth every day. (Patient not taking: Reported on  "9/26/2023)       No current facility-administered medications for this visit.     Facility-Administered Medications Ordered in Other Visits   Medication Dose Route Frequency Provider Last Rate Last Admin    ertapenem (INVanz) 1,000 mg in  mL IVPB  1 g Intravenous Once Ashley Chapman M.D. 200 mL/hr at 09/26/23 1742 1,000 mg at 09/26/23 1742       Allergies:   Patient has no known allergies.    Social History     Tobacco Use    Smoking status: Never    Smokeless tobacco: Never   Vaping Use    Vaping Use: Never used   Substance Use Topics    Alcohol use: No     Alcohol/week: 0.0 oz    Drug use: No       ROS:  Review of systems (+10 systems) unremarkable except for concerns noted by patient or items listed.    Please see HPI for additional ROS.     Objective:     Vitals:    09/08/23 1438   BP: 132/74   Pulse: (!) 54   Temp: 36.1 °C (97 °F)   TempSrc: Temporal   SpO2: 98%   Weight: 55.8 kg (123 lb)   Height: 1.499 m (4' 11\")     Body mass index is 24.84 kg/m².    Physical Exam:  Constitutional: Alert, no distress.  Skin: Warm, dry, good turgor, no rashes in visible areas.  Eye: Equal, round and reactive, conjunctiva clear, lids normal.  ENMT: Lips without lesions, good dentition  Respiratory: Unlabored respiratory effort, lungs clear to auscultation, no wheezes, no ronchi.  Cardiovascular: Normal S1, S2, no murmur, no edema.  Abdomen: Soft, pelvic tenderness, no CVA tenderness to percussion  Psych: Alert and oriented x3, normal affect and mood.      Assessment and Plan:     1. Permanent atrial fibrillation (HCC)  Stable on current medications per cardiology  Continue as prescribed  Currently rate controlled    2. Renovascular hypertension  Stable on current medications, refilled amlodipine  - amLODIPine (NORVASC) 5 MG Tab; Take 1 Tablet by mouth every day.  Dispense: 90 Tablet; Refill: 3    3. Acute cystitis without hematuria  Unstable  Culture pending  Treat with bactrim BID for 7 days  - " sulfamethoxazole-trimethoprim (BACTRIM DS) 800-160 MG tablet; Take 1 Tablet by mouth 2 times a day for 7 days.  Dispense: 14 Tablet; Refill: 0    4. Type 2 diabetes mellitus with other specified complication, with long-term current use of insulin (HCC)  Stable on current medications per endocrinology    - Chart and discharge summary were reviewed.   - Hospitalization and results reviewed with patient.   - Medications reviewed including instructions regarding high risk medications, dosing and side effects.  - Recommended Services: No services needed at this time  - Advance directive/POLST on file?  No     Follow-up:Return in about 4 weeks (around 10/6/2023).    Face-to-face transitional care management services with HIGH (today's visit is within days post discharge & LACE+ score 59+) medical decision complexity were provided.     LACE+ Historical Score Over Time (0-28: Low, 29-58: Medium, 59+: High): 77

## 2023-09-28 ENCOUNTER — HOSPITAL ENCOUNTER (OUTPATIENT)
Dept: LAB | Facility: MEDICAL CENTER | Age: 73
End: 2023-09-28
Attending: INTERNAL MEDICINE
Payer: MEDICARE

## 2023-09-28 ENCOUNTER — OUTPATIENT INFUSION SERVICES (OUTPATIENT)
Dept: ONCOLOGY | Facility: MEDICAL CENTER | Age: 73
End: 2023-09-28
Attending: INTERNAL MEDICINE
Payer: MEDICARE

## 2023-09-28 VITALS
OXYGEN SATURATION: 98 % | DIASTOLIC BLOOD PRESSURE: 55 MMHG | HEART RATE: 56 BPM | RESPIRATION RATE: 18 BRPM | SYSTOLIC BLOOD PRESSURE: 152 MMHG | TEMPERATURE: 97 F

## 2023-09-28 DIAGNOSIS — Z16.12 INFECTION DUE TO ESBL-PRODUCING ESCHERICHIA COLI: ICD-10-CM

## 2023-09-28 DIAGNOSIS — Z79.4 TYPE 2 DIABETES MELLITUS WITH OTHER SPECIFIED COMPLICATION, WITH LONG-TERM CURRENT USE OF INSULIN (HCC): ICD-10-CM

## 2023-09-28 DIAGNOSIS — D61.818 PANCYTOPENIA (HCC): Chronic | ICD-10-CM

## 2023-09-28 DIAGNOSIS — E11.69 TYPE 2 DIABETES MELLITUS WITH OTHER SPECIFIED COMPLICATION, WITH LONG-TERM CURRENT USE OF INSULIN (HCC): ICD-10-CM

## 2023-09-28 DIAGNOSIS — Z94.0 KIDNEY TRANSPLANT RECIPIENT: Chronic | ICD-10-CM

## 2023-09-28 DIAGNOSIS — A49.8 INFECTION DUE TO ESBL-PRODUCING ESCHERICHIA COLI: ICD-10-CM

## 2023-09-28 DIAGNOSIS — N18.6 ESRD (END STAGE RENAL DISEASE) (HCC): Chronic | ICD-10-CM

## 2023-09-28 LAB
ANION GAP SERPL CALC-SCNC: 9 MMOL/L (ref 7–16)
BACTERIA BLD CULT: NORMAL
BACTERIA BLD CULT: NORMAL
BASOPHILS # BLD AUTO: 0.3 % (ref 0–1.8)
BASOPHILS # BLD: 0.01 K/UL (ref 0–0.12)
BUN SERPL-MCNC: 21 MG/DL (ref 8–22)
CALCIUM SERPL-MCNC: 9.8 MG/DL (ref 8.4–10.2)
CHLORIDE SERPL-SCNC: 107 MMOL/L (ref 96–112)
CO2 SERPL-SCNC: 23 MMOL/L (ref 20–33)
CREAT SERPL-MCNC: 1.02 MG/DL (ref 0.5–1.4)
EOSINOPHIL # BLD AUTO: 0.04 K/UL (ref 0–0.51)
EOSINOPHIL NFR BLD: 1.4 % (ref 0–6.9)
ERYTHROCYTE [DISTWIDTH] IN BLOOD BY AUTOMATED COUNT: 47.5 FL (ref 35.9–50)
FASTING STATUS PATIENT QL REPORTED: NORMAL
GFR SERPLBLD CREATININE-BSD FMLA CKD-EPI: 58 ML/MIN/1.73 M 2
GLUCOSE SERPL-MCNC: 90 MG/DL (ref 65–99)
HCT VFR BLD AUTO: 35.6 % (ref 37–47)
HGB BLD-MCNC: 10.9 G/DL (ref 12–16)
IMM GRANULOCYTES # BLD AUTO: 0.02 K/UL (ref 0–0.11)
IMM GRANULOCYTES NFR BLD AUTO: 0.7 % (ref 0–0.9)
LYMPHOCYTES # BLD AUTO: 0.86 K/UL (ref 1–4.8)
LYMPHOCYTES NFR BLD: 29.6 % (ref 22–41)
MCH RBC QN AUTO: 27.7 PG (ref 27–33)
MCHC RBC AUTO-ENTMCNC: 30.6 G/DL (ref 32.2–35.5)
MCV RBC AUTO: 90.6 FL (ref 81.4–97.8)
MONOCYTES # BLD AUTO: 0.27 K/UL (ref 0–0.85)
MONOCYTES NFR BLD AUTO: 9.3 % (ref 0–13.4)
NEUTROPHILS # BLD AUTO: 1.71 K/UL (ref 1.82–7.42)
NEUTROPHILS NFR BLD: 58.7 % (ref 44–72)
NRBC # BLD AUTO: 0 K/UL
NRBC BLD-RTO: 0 /100 WBC (ref 0–0.2)
PHOSPHATE SERPL-MCNC: 3 MG/DL (ref 2.5–4.5)
PLATELET # BLD AUTO: 112 K/UL (ref 164–446)
PMV BLD AUTO: 11 FL (ref 9–12.9)
POTASSIUM SERPL-SCNC: 4.7 MMOL/L (ref 3.6–5.5)
RBC # BLD AUTO: 3.93 M/UL (ref 4.2–5.4)
SIGNIFICANT IND 70042: NORMAL
SIGNIFICANT IND 70042: NORMAL
SITE SITE: NORMAL
SITE SITE: NORMAL
SODIUM SERPL-SCNC: 139 MMOL/L (ref 135–145)
SOURCE SOURCE: NORMAL
SOURCE SOURCE: NORMAL
TACROLIMUS BLD-MCNC: 6.1 NG/ML (ref 5–20)
WBC # BLD AUTO: 3 K/UL (ref 4.8–10.8)

## 2023-09-28 PROCEDURE — 700105 HCHG RX REV CODE 258: Mod: UD | Performed by: INTERNAL MEDICINE

## 2023-09-28 PROCEDURE — 700111 HCHG RX REV CODE 636 W/ 250 OVERRIDE (IP): Mod: JZ,UD | Performed by: INTERNAL MEDICINE

## 2023-09-28 PROCEDURE — 36415 COLL VENOUS BLD VENIPUNCTURE: CPT

## 2023-09-28 PROCEDURE — 85025 COMPLETE CBC W/AUTO DIFF WBC: CPT

## 2023-09-28 PROCEDURE — 80048 BASIC METABOLIC PNL TOTAL CA: CPT

## 2023-09-28 PROCEDURE — 84100 ASSAY OF PHOSPHORUS: CPT

## 2023-09-28 PROCEDURE — 80197 ASSAY OF TACROLIMUS: CPT

## 2023-09-28 PROCEDURE — 96365 THER/PROPH/DIAG IV INF INIT: CPT

## 2023-09-28 RX ADMIN — ERTAPENEM 1000 MG: 1 INJECTION, POWDER, LYOPHILIZED, FOR SOLUTION INTRAMUSCULAR; INTRAVENOUS at 18:08

## 2023-09-28 NOTE — PROGRESS NOTES
Mrs Kenyetta into [facility] for IV Invanz for Infection due to ESBL-producing Escherichia coli / UTI. Pt denied having any new or acute complaints today, reports tolerating past treatments well. PIV started, had + blood return flushed briskly. Pt given Ertapenem as prescribed, tolerated well, denied having any complaints during or after infusion. PIV discontinued, bleeding controlled with gauze and coban. Discharge home to self care in [facility]. Appointment confirm for next treatment.

## 2023-09-29 ENCOUNTER — OUTPATIENT INFUSION SERVICES (OUTPATIENT)
Dept: ONCOLOGY | Facility: MEDICAL CENTER | Age: 73
End: 2023-09-29
Attending: INTERNAL MEDICINE
Payer: MEDICARE

## 2023-09-29 VITALS
SYSTOLIC BLOOD PRESSURE: 153 MMHG | WEIGHT: 125.44 LBS | RESPIRATION RATE: 18 BRPM | TEMPERATURE: 97 F | BODY MASS INDEX: 24.63 KG/M2 | DIASTOLIC BLOOD PRESSURE: 58 MMHG | OXYGEN SATURATION: 98 % | HEART RATE: 58 BPM

## 2023-09-29 DIAGNOSIS — Z16.12 INFECTION DUE TO ESBL-PRODUCING ESCHERICHIA COLI: ICD-10-CM

## 2023-09-29 DIAGNOSIS — A49.8 INFECTION DUE TO ESBL-PRODUCING ESCHERICHIA COLI: ICD-10-CM

## 2023-09-29 PROCEDURE — 700105 HCHG RX REV CODE 258: Mod: UD | Performed by: INTERNAL MEDICINE

## 2023-09-29 PROCEDURE — 700111 HCHG RX REV CODE 636 W/ 250 OVERRIDE (IP): Mod: JZ,UD | Performed by: INTERNAL MEDICINE

## 2023-09-29 PROCEDURE — 96365 THER/PROPH/DIAG IV INF INIT: CPT

## 2023-09-29 RX ADMIN — ERTAPENEM 1000 MG: 1 INJECTION, POWDER, LYOPHILIZED, FOR SOLUTION INTRAMUSCULAR; INTRAVENOUS at 18:09

## 2023-09-29 ASSESSMENT — FIBROSIS 4 INDEX: FIB4 SCORE: 3

## 2023-09-29 NOTE — PROGRESS NOTES
Tere presented to [facility]  with her daughter-in-law for daily ertapenem for infection due to ESBL-producing Escherichia coli. Pt reports no significant changes since yesterday. PIV started in right AC, brisk blood return observed and flushed easily. Ertapenem infused with no s/s of adverse reaction. PIV removed, gauze and Coban applied to the site. Pt has future appointments. Pt discharged to home in good condition with family.

## 2023-09-30 ENCOUNTER — OUTPATIENT INFUSION SERVICES (OUTPATIENT)
Dept: ONCOLOGY | Facility: MEDICAL CENTER | Age: 73
End: 2023-09-30
Attending: INTERNAL MEDICINE
Payer: MEDICARE

## 2023-09-30 VITALS
DIASTOLIC BLOOD PRESSURE: 54 MMHG | SYSTOLIC BLOOD PRESSURE: 157 MMHG | HEART RATE: 55 BPM | TEMPERATURE: 96.5 F | OXYGEN SATURATION: 97 % | RESPIRATION RATE: 18 BRPM

## 2023-09-30 DIAGNOSIS — A49.8 INFECTION DUE TO ESBL-PRODUCING ESCHERICHIA COLI: ICD-10-CM

## 2023-09-30 DIAGNOSIS — Z16.12 INFECTION DUE TO ESBL-PRODUCING ESCHERICHIA COLI: ICD-10-CM

## 2023-09-30 PROCEDURE — 700105 HCHG RX REV CODE 258: Mod: UD | Performed by: INTERNAL MEDICINE

## 2023-09-30 PROCEDURE — 96365 THER/PROPH/DIAG IV INF INIT: CPT

## 2023-09-30 PROCEDURE — 700111 HCHG RX REV CODE 636 W/ 250 OVERRIDE (IP): Mod: JZ,UD | Performed by: INTERNAL MEDICINE

## 2023-09-30 RX ADMIN — ERTAPENEM 1000 MG: 1 INJECTION, POWDER, LYOPHILIZED, FOR SOLUTION INTRAMUSCULAR; INTRAVENOUS at 18:09

## 2023-09-30 NOTE — PROGRESS NOTES
Pt presented to [facility] for daily IV Invanz. POC discussed and pt verbalized understanding, daughter hereto interpret. PIV started, brisk blood return noted and line flushes with ease. Invanz administered per MAR and pt tolerated well. No s/s of reactions or complications noted. PIV flushed, removed with tip intact and site covered with sterile gauze/coban and pt tolerated well. Pt daughter confirmed tomorrow's appt and discharged ambulatory in [facility].

## 2023-10-01 ENCOUNTER — OUTPATIENT INFUSION SERVICES (OUTPATIENT)
Dept: ONCOLOGY | Facility: MEDICAL CENTER | Age: 73
End: 2023-10-01
Attending: INTERNAL MEDICINE
Payer: MEDICARE

## 2023-10-01 VITALS
HEART RATE: 61 BPM | RESPIRATION RATE: 18 BRPM | OXYGEN SATURATION: 98 % | DIASTOLIC BLOOD PRESSURE: 55 MMHG | SYSTOLIC BLOOD PRESSURE: 162 MMHG | TEMPERATURE: 97.6 F

## 2023-10-01 DIAGNOSIS — A49.8 INFECTION DUE TO ESBL-PRODUCING ESCHERICHIA COLI: ICD-10-CM

## 2023-10-01 DIAGNOSIS — Z16.12 INFECTION DUE TO ESBL-PRODUCING ESCHERICHIA COLI: ICD-10-CM

## 2023-10-01 PROCEDURE — 700111 HCHG RX REV CODE 636 W/ 250 OVERRIDE (IP): Mod: JZ,UD | Performed by: INTERNAL MEDICINE

## 2023-10-01 PROCEDURE — 700105 HCHG RX REV CODE 258: Mod: UD | Performed by: INTERNAL MEDICINE

## 2023-10-01 PROCEDURE — 96365 THER/PROPH/DIAG IV INF INIT: CPT

## 2023-10-01 RX ADMIN — ERTAPENEM 1000 MG: 1 INJECTION, POWDER, LYOPHILIZED, FOR SOLUTION INTRAMUSCULAR; INTRAVENOUS at 18:21

## 2023-10-01 NOTE — PROGRESS NOTES
Pt arrived ambulatory to [facility] for daily Invanz infusion. POC discussed with pt and she agrees with plan.     PIV established, brisk blood return noted. Pt medicated per MAR. Pt tolerated treatment without s/s adverse reaction. PIV dc'd catheter tip intact, gauze and coban dressing applied. Pt discharged to self care, NAD. Pt to return tomorrow.

## 2023-10-02 ENCOUNTER — HOSPITAL ENCOUNTER (OUTPATIENT)
Dept: LAB | Facility: MEDICAL CENTER | Age: 73
End: 2023-10-02
Attending: INTERNAL MEDICINE
Payer: MEDICARE

## 2023-10-02 ENCOUNTER — OUTPATIENT INFUSION SERVICES (OUTPATIENT)
Dept: ONCOLOGY | Facility: MEDICAL CENTER | Age: 73
End: 2023-10-02
Attending: INTERNAL MEDICINE
Payer: MEDICARE

## 2023-10-02 VITALS
HEART RATE: 60 BPM | OXYGEN SATURATION: 95 % | TEMPERATURE: 97 F | BODY MASS INDEX: 24.63 KG/M2 | DIASTOLIC BLOOD PRESSURE: 58 MMHG | WEIGHT: 125.44 LBS | SYSTOLIC BLOOD PRESSURE: 156 MMHG | RESPIRATION RATE: 16 BRPM

## 2023-10-02 DIAGNOSIS — A49.8 INFECTION DUE TO ESBL-PRODUCING ESCHERICHIA COLI: ICD-10-CM

## 2023-10-02 DIAGNOSIS — Z16.12 INFECTION DUE TO ESBL-PRODUCING ESCHERICHIA COLI: ICD-10-CM

## 2023-10-02 LAB
ALBUMIN SERPL BCP-MCNC: 3.9 G/DL (ref 3.2–4.9)
ALBUMIN SERPL BCP-MCNC: 4 G/DL (ref 3.2–4.9)
ALBUMIN/GLOB SERPL: 1.6 G/DL
ALBUMIN/GLOB SERPL: 1.6 G/DL
ALP SERPL-CCNC: 100 U/L (ref 30–99)
ALP SERPL-CCNC: 91 U/L (ref 30–99)
ALT SERPL-CCNC: 35 U/L (ref 2–50)
ALT SERPL-CCNC: 36 U/L (ref 2–50)
ANION GAP SERPL CALC-SCNC: 11 MMOL/L (ref 7–16)
ANION GAP SERPL CALC-SCNC: 9 MMOL/L (ref 7–16)
APPEARANCE UR: ABNORMAL
AST SERPL-CCNC: 33 U/L (ref 12–45)
AST SERPL-CCNC: 37 U/L (ref 12–45)
BACTERIA #/AREA URNS HPF: NEGATIVE /HPF
BASOPHILS # BLD AUTO: 0.2 % (ref 0–1.8)
BASOPHILS # BLD AUTO: 0.3 % (ref 0–1.8)
BASOPHILS # BLD: 0.01 K/UL (ref 0–0.12)
BASOPHILS # BLD: 0.01 K/UL (ref 0–0.12)
BILIRUB SERPL-MCNC: 0.3 MG/DL (ref 0.1–1.5)
BILIRUB SERPL-MCNC: 0.3 MG/DL (ref 0.1–1.5)
BILIRUB UR QL STRIP.AUTO: NEGATIVE
BUN SERPL-MCNC: 20 MG/DL (ref 8–22)
BUN SERPL-MCNC: 21 MG/DL (ref 8–22)
CALCIUM ALBUM COR SERPL-MCNC: 10.1 MG/DL (ref 8.5–10.5)
CALCIUM ALBUM COR SERPL-MCNC: 9.8 MG/DL (ref 8.5–10.5)
CALCIUM SERPL-MCNC: 10.1 MG/DL (ref 8.5–10.5)
CALCIUM SERPL-MCNC: 9.7 MG/DL (ref 8.4–10.2)
CHLORIDE SERPL-SCNC: 107 MMOL/L (ref 96–112)
CHLORIDE SERPL-SCNC: 108 MMOL/L (ref 96–112)
CO2 SERPL-SCNC: 22 MMOL/L (ref 20–33)
CO2 SERPL-SCNC: 23 MMOL/L (ref 20–33)
COLOR UR: ABNORMAL
CREAT SERPL-MCNC: 0.9 MG/DL (ref 0.5–1.4)
CREAT SERPL-MCNC: 0.94 MG/DL (ref 0.5–1.4)
CRP SERPL HS-MCNC: <0.3 MG/DL (ref 0–0.75)
EOSINOPHIL # BLD AUTO: 0.01 K/UL (ref 0–0.51)
EOSINOPHIL # BLD AUTO: 0.04 K/UL (ref 0–0.51)
EOSINOPHIL NFR BLD: 0.2 % (ref 0–6.9)
EOSINOPHIL NFR BLD: 1.2 % (ref 0–6.9)
EPI CELLS #/AREA URNS HPF: ABNORMAL /HPF
ERYTHROCYTE [DISTWIDTH] IN BLOOD BY AUTOMATED COUNT: 45.4 FL (ref 35.9–50)
ERYTHROCYTE [DISTWIDTH] IN BLOOD BY AUTOMATED COUNT: 45.7 FL (ref 35.9–50)
ERYTHROCYTE [SEDIMENTATION RATE] IN BLOOD BY WESTERGREN METHOD: 18 MM/HOUR (ref 0–25)
GFR SERPLBLD CREATININE-BSD FMLA CKD-EPI: 64 ML/MIN/1.73 M 2
GFR SERPLBLD CREATININE-BSD FMLA CKD-EPI: 67 ML/MIN/1.73 M 2
GLOBULIN SER CALC-MCNC: 2.4 G/DL (ref 1.9–3.5)
GLOBULIN SER CALC-MCNC: 2.5 G/DL (ref 1.9–3.5)
GLUCOSE SERPL-MCNC: 197 MG/DL (ref 65–99)
GLUCOSE SERPL-MCNC: 88 MG/DL (ref 65–99)
GLUCOSE UR STRIP.AUTO-MCNC: NEGATIVE MG/DL
HCT VFR BLD AUTO: 35.1 % (ref 37–47)
HCT VFR BLD AUTO: 35.3 % (ref 37–47)
HGB BLD-MCNC: 11.1 G/DL (ref 12–16)
HGB BLD-MCNC: 11.1 G/DL (ref 12–16)
IMM GRANULOCYTES # BLD AUTO: 0.02 K/UL (ref 0–0.11)
IMM GRANULOCYTES # BLD AUTO: 0.02 K/UL (ref 0–0.11)
IMM GRANULOCYTES NFR BLD AUTO: 0.5 % (ref 0–0.9)
IMM GRANULOCYTES NFR BLD AUTO: 0.6 % (ref 0–0.9)
KETONES UR STRIP.AUTO-MCNC: NEGATIVE MG/DL
LEUKOCYTE ESTERASE UR QL STRIP.AUTO: ABNORMAL
LYMPHOCYTES # BLD AUTO: 0.47 K/UL (ref 1–4.8)
LYMPHOCYTES # BLD AUTO: 0.75 K/UL (ref 1–4.8)
LYMPHOCYTES NFR BLD: 11.1 % (ref 22–41)
LYMPHOCYTES NFR BLD: 22.5 % (ref 22–41)
MCH RBC QN AUTO: 28.1 PG (ref 27–33)
MCH RBC QN AUTO: 28.2 PG (ref 27–33)
MCHC RBC AUTO-ENTMCNC: 31.4 G/DL (ref 32.2–35.5)
MCHC RBC AUTO-ENTMCNC: 31.6 G/DL (ref 32.2–35.5)
MCV RBC AUTO: 89.1 FL (ref 81.4–97.8)
MCV RBC AUTO: 89.4 FL (ref 81.4–97.8)
MICRO URNS: ABNORMAL
MONOCYTES # BLD AUTO: 0.16 K/UL (ref 0–0.85)
MONOCYTES # BLD AUTO: 0.27 K/UL (ref 0–0.85)
MONOCYTES NFR BLD AUTO: 3.8 % (ref 0–13.4)
MONOCYTES NFR BLD AUTO: 8.1 % (ref 0–13.4)
NEUTROPHILS # BLD AUTO: 2.24 K/UL (ref 1.82–7.42)
NEUTROPHILS # BLD AUTO: 3.55 K/UL (ref 1.82–7.42)
NEUTROPHILS NFR BLD: 67.3 % (ref 44–72)
NEUTROPHILS NFR BLD: 84.2 % (ref 44–72)
NITRITE UR QL STRIP.AUTO: NEGATIVE
NRBC # BLD AUTO: 0 K/UL
NRBC # BLD AUTO: 0 K/UL
NRBC BLD-RTO: 0 /100 WBC (ref 0–0.2)
NRBC BLD-RTO: 0 /100 WBC (ref 0–0.2)
OUTPT INFUS CBC COMMENT OICOM: ABNORMAL
PH UR STRIP.AUTO: 7 [PH] (ref 5–8)
PLATELET # BLD AUTO: 111 K/UL (ref 164–446)
PLATELET # BLD AUTO: 119 K/UL (ref 164–446)
PMV BLD AUTO: 11.9 FL (ref 9–12.9)
PMV BLD AUTO: 12.1 FL (ref 9–12.9)
POTASSIUM SERPL-SCNC: 4.3 MMOL/L (ref 3.6–5.5)
POTASSIUM SERPL-SCNC: 4.7 MMOL/L (ref 3.6–5.5)
PROT SERPL-MCNC: 6.3 G/DL (ref 6–8.2)
PROT SERPL-MCNC: 6.5 G/DL (ref 6–8.2)
PROT UR QL STRIP: NEGATIVE MG/DL
RBC # BLD AUTO: 3.94 M/UL (ref 4.2–5.4)
RBC # BLD AUTO: 3.95 M/UL (ref 4.2–5.4)
RBC # URNS HPF: ABNORMAL /HPF
RBC UR QL AUTO: ABNORMAL
SODIUM SERPL-SCNC: 140 MMOL/L (ref 135–145)
SODIUM SERPL-SCNC: 140 MMOL/L (ref 135–145)
SP GR UR STRIP.AUTO: 1.01
TACROLIMUS BLD-MCNC: 6.7 NG/ML (ref 5–20)
UNIDENT CRYS URNS QL MICRO: ABNORMAL /HPF
WBC # BLD AUTO: 3.3 K/UL (ref 4.8–10.8)
WBC # BLD AUTO: 4.2 K/UL (ref 4.8–10.8)
WBC #/AREA URNS HPF: ABNORMAL /HPF

## 2023-10-02 PROCEDURE — 85025 COMPLETE CBC W/AUTO DIFF WBC: CPT | Mod: 91

## 2023-10-02 PROCEDURE — 80053 COMPREHEN METABOLIC PANEL: CPT

## 2023-10-02 PROCEDURE — 86140 C-REACTIVE PROTEIN: CPT

## 2023-10-02 PROCEDURE — 85025 COMPLETE CBC W/AUTO DIFF WBC: CPT

## 2023-10-02 PROCEDURE — 87086 URINE CULTURE/COLONY COUNT: CPT

## 2023-10-02 PROCEDURE — 81001 URINALYSIS AUTO W/SCOPE: CPT

## 2023-10-02 PROCEDURE — 85652 RBC SED RATE AUTOMATED: CPT

## 2023-10-02 PROCEDURE — 700105 HCHG RX REV CODE 258: Mod: UD | Performed by: INTERNAL MEDICINE

## 2023-10-02 PROCEDURE — 700111 HCHG RX REV CODE 636 W/ 250 OVERRIDE (IP): Mod: JZ,UD | Performed by: INTERNAL MEDICINE

## 2023-10-02 PROCEDURE — 80053 COMPREHEN METABOLIC PANEL: CPT | Mod: 91

## 2023-10-02 PROCEDURE — 96365 THER/PROPH/DIAG IV INF INIT: CPT

## 2023-10-02 PROCEDURE — 80197 ASSAY OF TACROLIMUS: CPT

## 2023-10-02 PROCEDURE — 87799 DETECT AGENT NOS DNA QUANT: CPT

## 2023-10-02 PROCEDURE — 36415 COLL VENOUS BLD VENIPUNCTURE: CPT

## 2023-10-02 RX ADMIN — ERTAPENEM 1000 MG: 1 INJECTION, POWDER, LYOPHILIZED, FOR SOLUTION INTRAMUSCULAR; INTRAVENOUS at 18:22

## 2023-10-02 ASSESSMENT — FIBROSIS 4 INDEX: FIB4 SCORE: 3.67

## 2023-10-02 NOTE — PROGRESS NOTES
Tere came into infusion services for daily invanz. No complaints. PIV started on right forearm, +blood return, flushed well. Antibiotic given as prescribed. Tolerated well. PIV removed, covered with gauze and coban. Pt has future appointments. Discharged to self care.

## 2023-10-03 NOTE — PROGRESS NOTES
[facility] for daily Invanz ambulatory, family at side. POC reviewed, information obtained utilizing  services. IV started in R AC x 1 attempt, brisk blood return noted/ labs drawn off line, Pt tolerated well. Invanz infused per MD order, Tere tolerated well. IV removed, tip intact/ sterile gauze and coban applied. No future appointment needed at this time, as Pt is done with treatment. Tere was discharged home in no acute distress.

## 2023-10-04 LAB
BACTERIA UR CULT: NORMAL
BK PLASMA INTERP, QNT NAAT NL11711: DETECTED
BK PLASMA IU/ML, QNT NAAT NL11709: 338 IU/ML
BK PLASMA LOG IU/ML, QNT NAAT NL11710: 2.53 LOG IU/ML
BK UR INTERP, QNT NAAT NL11708: DETECTED
BK UR IU/ML, QNT NAAT NL11706: ABNORMAL IU/ML
BK UR LOG IU/ML, QNT NAAT NL11707: 5.48 LOG IU/ML
SIGNIFICANT IND 70042: NORMAL
SITE SITE: NORMAL
SOURCE SOURCE: NORMAL

## 2023-10-05 ENCOUNTER — HOSPITAL ENCOUNTER (OUTPATIENT)
Facility: MEDICAL CENTER | Age: 73
End: 2023-10-05
Attending: INTERNAL MEDICINE
Payer: MEDICARE

## 2023-10-06 ENCOUNTER — HOSPITAL ENCOUNTER (OUTPATIENT)
Dept: LAB | Facility: MEDICAL CENTER | Age: 73
End: 2023-10-06
Attending: INTERNAL MEDICINE
Payer: MEDICARE

## 2023-10-06 LAB
ANION GAP SERPL CALC-SCNC: 9 MMOL/L (ref 7–16)
BASOPHILS # BLD AUTO: 0.3 % (ref 0–1.8)
BASOPHILS # BLD: 0.01 K/UL (ref 0–0.12)
BUN SERPL-MCNC: 21 MG/DL (ref 8–22)
CALCIUM SERPL-MCNC: 10 MG/DL (ref 8.4–10.2)
CHLORIDE SERPL-SCNC: 108 MMOL/L (ref 96–112)
CO2 SERPL-SCNC: 23 MMOL/L (ref 20–33)
CREAT SERPL-MCNC: 1.06 MG/DL (ref 0.5–1.4)
EOSINOPHIL # BLD AUTO: 0.02 K/UL (ref 0–0.51)
EOSINOPHIL NFR BLD: 0.6 % (ref 0–6.9)
ERYTHROCYTE [DISTWIDTH] IN BLOOD BY AUTOMATED COUNT: 46.6 FL (ref 35.9–50)
GFR SERPLBLD CREATININE-BSD FMLA CKD-EPI: 55 ML/MIN/1.73 M 2
GLUCOSE SERPL-MCNC: 92 MG/DL (ref 65–99)
HCT VFR BLD AUTO: 37.2 % (ref 37–47)
HGB BLD-MCNC: 11.7 G/DL (ref 12–16)
IMM GRANULOCYTES # BLD AUTO: 0.01 K/UL (ref 0–0.11)
IMM GRANULOCYTES NFR BLD AUTO: 0.3 % (ref 0–0.9)
LYMPHOCYTES # BLD AUTO: 0.78 K/UL (ref 1–4.8)
LYMPHOCYTES NFR BLD: 23.5 % (ref 22–41)
MCH RBC QN AUTO: 28.5 PG (ref 27–33)
MCHC RBC AUTO-ENTMCNC: 31.5 G/DL (ref 32.2–35.5)
MCV RBC AUTO: 90.5 FL (ref 81.4–97.8)
MONOCYTES # BLD AUTO: 0.28 K/UL (ref 0–0.85)
MONOCYTES NFR BLD AUTO: 8.4 % (ref 0–13.4)
NEUTROPHILS # BLD AUTO: 2.22 K/UL (ref 1.82–7.42)
NEUTROPHILS NFR BLD: 66.9 % (ref 44–72)
NRBC # BLD AUTO: 0 K/UL
NRBC BLD-RTO: 0 /100 WBC (ref 0–0.2)
PHOSPHATE SERPL-MCNC: 3 MG/DL (ref 2.5–4.5)
PLATELET # BLD AUTO: 133 K/UL (ref 164–446)
PMV BLD AUTO: 11.1 FL (ref 9–12.9)
POTASSIUM SERPL-SCNC: 4.5 MMOL/L (ref 3.6–5.5)
RBC # BLD AUTO: 4.11 M/UL (ref 4.2–5.4)
SODIUM SERPL-SCNC: 140 MMOL/L (ref 135–145)
TACROLIMUS BLD-MCNC: 6.5 NG/ML (ref 5–20)
WBC # BLD AUTO: 3.3 K/UL (ref 4.8–10.8)

## 2023-10-06 PROCEDURE — 80197 ASSAY OF TACROLIMUS: CPT

## 2023-10-06 PROCEDURE — 84100 ASSAY OF PHOSPHORUS: CPT

## 2023-10-06 PROCEDURE — 36415 COLL VENOUS BLD VENIPUNCTURE: CPT

## 2023-10-06 PROCEDURE — 85025 COMPLETE CBC W/AUTO DIFF WBC: CPT

## 2023-10-06 PROCEDURE — 80048 BASIC METABOLIC PNL TOTAL CA: CPT

## 2023-10-09 ENCOUNTER — HOSPITAL ENCOUNTER (OUTPATIENT)
Dept: LAB | Facility: MEDICAL CENTER | Age: 73
End: 2023-10-09
Attending: INTERNAL MEDICINE
Payer: MEDICARE

## 2023-10-09 LAB
ANION GAP SERPL CALC-SCNC: 8 MMOL/L (ref 7–16)
BASOPHILS # BLD AUTO: 0.3 % (ref 0–1.8)
BASOPHILS # BLD: 0.01 K/UL (ref 0–0.12)
BUN SERPL-MCNC: 31 MG/DL (ref 8–22)
CALCIUM SERPL-MCNC: 9.8 MG/DL (ref 8.4–10.2)
CHLORIDE SERPL-SCNC: 106 MMOL/L (ref 96–112)
CO2 SERPL-SCNC: 24 MMOL/L (ref 20–33)
CREAT SERPL-MCNC: 1.17 MG/DL (ref 0.5–1.4)
EOSINOPHIL # BLD AUTO: 0.05 K/UL (ref 0–0.51)
EOSINOPHIL NFR BLD: 1.4 % (ref 0–6.9)
ERYTHROCYTE [DISTWIDTH] IN BLOOD BY AUTOMATED COUNT: 47.2 FL (ref 35.9–50)
GFR SERPLBLD CREATININE-BSD FMLA CKD-EPI: 49 ML/MIN/1.73 M 2
GLUCOSE SERPL-MCNC: 101 MG/DL (ref 65–99)
HCT VFR BLD AUTO: 37.7 % (ref 37–47)
HGB BLD-MCNC: 11.6 G/DL (ref 12–16)
IMM GRANULOCYTES # BLD AUTO: 0.02 K/UL (ref 0–0.11)
IMM GRANULOCYTES NFR BLD AUTO: 0.6 % (ref 0–0.9)
LYMPHOCYTES # BLD AUTO: 0.8 K/UL (ref 1–4.8)
LYMPHOCYTES NFR BLD: 23.2 % (ref 22–41)
MCH RBC QN AUTO: 28 PG (ref 27–33)
MCHC RBC AUTO-ENTMCNC: 30.8 G/DL (ref 32.2–35.5)
MCV RBC AUTO: 90.8 FL (ref 81.4–97.8)
MONOCYTES # BLD AUTO: 0.35 K/UL (ref 0–0.85)
MONOCYTES NFR BLD AUTO: 10.1 % (ref 0–13.4)
NEUTROPHILS # BLD AUTO: 2.22 K/UL (ref 1.82–7.42)
NEUTROPHILS NFR BLD: 64.4 % (ref 44–72)
NRBC # BLD AUTO: 0 K/UL
NRBC BLD-RTO: 0 /100 WBC (ref 0–0.2)
PHOSPHATE SERPL-MCNC: 2.6 MG/DL (ref 2.5–4.5)
PLATELET # BLD AUTO: 127 K/UL (ref 164–446)
PMV BLD AUTO: 11.6 FL (ref 9–12.9)
POTASSIUM SERPL-SCNC: 4.4 MMOL/L (ref 3.6–5.5)
RBC # BLD AUTO: 4.15 M/UL (ref 4.2–5.4)
SODIUM SERPL-SCNC: 138 MMOL/L (ref 135–145)
TACROLIMUS BLD-MCNC: 5.3 NG/ML (ref 5–20)
WBC # BLD AUTO: 3.5 K/UL (ref 4.8–10.8)

## 2023-10-09 PROCEDURE — 80048 BASIC METABOLIC PNL TOTAL CA: CPT

## 2023-10-09 PROCEDURE — 36415 COLL VENOUS BLD VENIPUNCTURE: CPT

## 2023-10-09 PROCEDURE — 80197 ASSAY OF TACROLIMUS: CPT

## 2023-10-09 PROCEDURE — 84100 ASSAY OF PHOSPHORUS: CPT

## 2023-10-09 PROCEDURE — 85025 COMPLETE CBC W/AUTO DIFF WBC: CPT

## 2023-10-16 ENCOUNTER — HOSPITAL ENCOUNTER (OUTPATIENT)
Dept: LAB | Facility: MEDICAL CENTER | Age: 73
End: 2023-10-16
Attending: INTERNAL MEDICINE
Payer: MEDICARE

## 2023-10-16 LAB
ANION GAP SERPL CALC-SCNC: 10 MMOL/L (ref 7–16)
BASOPHILS # BLD AUTO: 0.3 % (ref 0–1.8)
BASOPHILS # BLD: 0.01 K/UL (ref 0–0.12)
BUN SERPL-MCNC: 26 MG/DL (ref 8–22)
CALCIUM SERPL-MCNC: 9.9 MG/DL (ref 8.4–10.2)
CHLORIDE SERPL-SCNC: 107 MMOL/L (ref 96–112)
CO2 SERPL-SCNC: 23 MMOL/L (ref 20–33)
CREAT SERPL-MCNC: 1.07 MG/DL (ref 0.5–1.4)
EOSINOPHIL # BLD AUTO: 0.04 K/UL (ref 0–0.51)
EOSINOPHIL NFR BLD: 1.2 % (ref 0–6.9)
ERYTHROCYTE [DISTWIDTH] IN BLOOD BY AUTOMATED COUNT: 48.6 FL (ref 35.9–50)
GFR SERPLBLD CREATININE-BSD FMLA CKD-EPI: 55 ML/MIN/1.73 M 2
GLUCOSE SERPL-MCNC: 90 MG/DL (ref 65–99)
HCT VFR BLD AUTO: 39.1 % (ref 37–47)
HGB BLD-MCNC: 12.3 G/DL (ref 12–16)
IMM GRANULOCYTES # BLD AUTO: 0.02 K/UL (ref 0–0.11)
IMM GRANULOCYTES NFR BLD AUTO: 0.6 % (ref 0–0.9)
LYMPHOCYTES # BLD AUTO: 0.69 K/UL (ref 1–4.8)
LYMPHOCYTES NFR BLD: 21.4 % (ref 22–41)
MCH RBC QN AUTO: 28.5 PG (ref 27–33)
MCHC RBC AUTO-ENTMCNC: 31.5 G/DL (ref 32.2–35.5)
MCV RBC AUTO: 90.5 FL (ref 81.4–97.8)
MONOCYTES # BLD AUTO: 0.32 K/UL (ref 0–0.85)
MONOCYTES NFR BLD AUTO: 9.9 % (ref 0–13.4)
NEUTROPHILS # BLD AUTO: 2.15 K/UL (ref 1.82–7.42)
NEUTROPHILS NFR BLD: 66.6 % (ref 44–72)
NRBC # BLD AUTO: 0 K/UL
NRBC BLD-RTO: 0 /100 WBC (ref 0–0.2)
PHOSPHATE SERPL-MCNC: 2.5 MG/DL (ref 2.5–4.5)
PLATELET # BLD AUTO: 106 K/UL (ref 164–446)
PMV BLD AUTO: 11.5 FL (ref 9–12.9)
POTASSIUM SERPL-SCNC: 4.1 MMOL/L (ref 3.6–5.5)
RBC # BLD AUTO: 4.32 M/UL (ref 4.2–5.4)
SODIUM SERPL-SCNC: 140 MMOL/L (ref 135–145)
TACROLIMUS BLD-MCNC: 3.9 NG/ML (ref 5–20)
WBC # BLD AUTO: 3.2 K/UL (ref 4.8–10.8)

## 2023-10-16 PROCEDURE — 84100 ASSAY OF PHOSPHORUS: CPT

## 2023-10-16 PROCEDURE — 85025 COMPLETE CBC W/AUTO DIFF WBC: CPT

## 2023-10-16 PROCEDURE — 80197 ASSAY OF TACROLIMUS: CPT

## 2023-10-16 PROCEDURE — 80048 BASIC METABOLIC PNL TOTAL CA: CPT

## 2023-10-16 PROCEDURE — 36415 COLL VENOUS BLD VENIPUNCTURE: CPT

## 2023-10-23 ENCOUNTER — HOSPITAL ENCOUNTER (OUTPATIENT)
Dept: LAB | Facility: MEDICAL CENTER | Age: 73
End: 2023-10-23
Attending: NURSE PRACTITIONER
Payer: MEDICARE

## 2023-10-23 LAB
ANION GAP SERPL CALC-SCNC: 12 MMOL/L (ref 7–16)
BASOPHILS # BLD AUTO: 0.5 % (ref 0–1.8)
BASOPHILS # BLD: 0.02 K/UL (ref 0–0.12)
BUN SERPL-MCNC: 30 MG/DL (ref 8–22)
CALCIUM SERPL-MCNC: 9.8 MG/DL (ref 8.4–10.2)
CHLORIDE SERPL-SCNC: 104 MMOL/L (ref 96–112)
CO2 SERPL-SCNC: 24 MMOL/L (ref 20–33)
CREAT SERPL-MCNC: 1.08 MG/DL (ref 0.5–1.4)
EOSINOPHIL # BLD AUTO: 0.03 K/UL (ref 0–0.51)
EOSINOPHIL NFR BLD: 0.7 % (ref 0–6.9)
ERYTHROCYTE [DISTWIDTH] IN BLOOD BY AUTOMATED COUNT: 48.4 FL (ref 35.9–50)
GFR SERPLBLD CREATININE-BSD FMLA CKD-EPI: 54 ML/MIN/1.73 M 2
GLUCOSE SERPL-MCNC: 107 MG/DL (ref 65–99)
HCT VFR BLD AUTO: 39.7 % (ref 37–47)
HGB BLD-MCNC: 12.5 G/DL (ref 12–16)
IMM GRANULOCYTES # BLD AUTO: 0.01 K/UL (ref 0–0.11)
IMM GRANULOCYTES NFR BLD AUTO: 0.2 % (ref 0–0.9)
LYMPHOCYTES # BLD AUTO: 0.82 K/UL (ref 1–4.8)
LYMPHOCYTES NFR BLD: 20.4 % (ref 22–41)
MCH RBC QN AUTO: 28.2 PG (ref 27–33)
MCHC RBC AUTO-ENTMCNC: 31.5 G/DL (ref 32.2–35.5)
MCV RBC AUTO: 89.6 FL (ref 81.4–97.8)
MONOCYTES # BLD AUTO: 0.32 K/UL (ref 0–0.85)
MONOCYTES NFR BLD AUTO: 8 % (ref 0–13.4)
NEUTROPHILS # BLD AUTO: 2.81 K/UL (ref 1.82–7.42)
NEUTROPHILS NFR BLD: 70.2 % (ref 44–72)
NRBC # BLD AUTO: 0 K/UL
NRBC BLD-RTO: 0 /100 WBC (ref 0–0.2)
PHOSPHATE SERPL-MCNC: 3.3 MG/DL (ref 2.5–4.5)
PLATELET # BLD AUTO: 107 K/UL (ref 164–446)
PMV BLD AUTO: 11.3 FL (ref 9–12.9)
POTASSIUM SERPL-SCNC: 4.3 MMOL/L (ref 3.6–5.5)
RBC # BLD AUTO: 4.43 M/UL (ref 4.2–5.4)
SODIUM SERPL-SCNC: 140 MMOL/L (ref 135–145)
TACROLIMUS BLD-MCNC: 7 NG/ML (ref 5–20)
WBC # BLD AUTO: 4 K/UL (ref 4.8–10.8)

## 2023-10-23 PROCEDURE — 84100 ASSAY OF PHOSPHORUS: CPT

## 2023-10-23 PROCEDURE — 36415 COLL VENOUS BLD VENIPUNCTURE: CPT

## 2023-10-23 PROCEDURE — 80048 BASIC METABOLIC PNL TOTAL CA: CPT

## 2023-10-23 PROCEDURE — 85025 COMPLETE CBC W/AUTO DIFF WBC: CPT

## 2023-10-23 PROCEDURE — 80197 ASSAY OF TACROLIMUS: CPT

## 2023-10-25 ENCOUNTER — OFFICE VISIT (OUTPATIENT)
Dept: MEDICAL GROUP | Facility: MEDICAL CENTER | Age: 73
End: 2023-10-25
Payer: MEDICARE

## 2023-10-25 ENCOUNTER — HOSPITAL ENCOUNTER (OUTPATIENT)
Facility: MEDICAL CENTER | Age: 73
End: 2023-10-25
Attending: NURSE PRACTITIONER
Payer: MEDICARE

## 2023-10-25 VITALS
HEIGHT: 61 IN | BODY MASS INDEX: 24.55 KG/M2 | HEART RATE: 56 BPM | WEIGHT: 130 LBS | OXYGEN SATURATION: 96 % | TEMPERATURE: 97 F | DIASTOLIC BLOOD PRESSURE: 42 MMHG | SYSTOLIC BLOOD PRESSURE: 132 MMHG

## 2023-10-25 DIAGNOSIS — Z16.12 INFECTION DUE TO ESBL-PRODUCING ESCHERICHIA COLI: ICD-10-CM

## 2023-10-25 DIAGNOSIS — R30.0 DYSURIA: ICD-10-CM

## 2023-10-25 DIAGNOSIS — E03.9 ACQUIRED HYPOTHYROIDISM: ICD-10-CM

## 2023-10-25 DIAGNOSIS — A49.8 INFECTION DUE TO ESBL-PRODUCING ESCHERICHIA COLI: ICD-10-CM

## 2023-10-25 LAB
APPEARANCE UR: CLEAR
BILIRUB UR STRIP-MCNC: NORMAL MG/DL
COLOR UR AUTO: YELLOW
GLUCOSE UR STRIP.AUTO-MCNC: NORMAL MG/DL
KETONES UR STRIP.AUTO-MCNC: NORMAL MG/DL
LEUKOCYTE ESTERASE UR QL STRIP.AUTO: NORMAL
NITRITE UR QL STRIP.AUTO: NORMAL
PH UR STRIP.AUTO: 6 [PH] (ref 5–8)
PROT UR QL STRIP: NORMAL MG/DL
RBC UR QL AUTO: NORMAL
SP GR UR STRIP.AUTO: 1.01
UROBILINOGEN UR STRIP-MCNC: 0.2 MG/DL

## 2023-10-25 PROCEDURE — 87186 SC STD MICRODIL/AGAR DIL: CPT

## 2023-10-25 PROCEDURE — 99214 OFFICE O/P EST MOD 30 MIN: CPT | Performed by: NURSE PRACTITIONER

## 2023-10-25 PROCEDURE — 81002 URINALYSIS NONAUTO W/O SCOPE: CPT | Performed by: NURSE PRACTITIONER

## 2023-10-25 PROCEDURE — 87077 CULTURE AEROBIC IDENTIFY: CPT

## 2023-10-25 PROCEDURE — 3078F DIAST BP <80 MM HG: CPT | Performed by: NURSE PRACTITIONER

## 2023-10-25 PROCEDURE — 87086 URINE CULTURE/COLONY COUNT: CPT

## 2023-10-25 PROCEDURE — 3075F SYST BP GE 130 - 139MM HG: CPT | Performed by: NURSE PRACTITIONER

## 2023-10-25 ASSESSMENT — FIBROSIS 4 INDEX: FIB4 SCORE: 4.21

## 2023-10-25 NOTE — ASSESSMENT & PLAN NOTE
Patient recently hospitalized for UTI, received IV antibiotics and reports symptoms completely resolved.     Today she reports that she has started getting painful urination again which started 1-2 days ago.     She

## 2023-10-25 NOTE — LETTER
MALGORZATA Concepcion.  [place] [id number] [place] [address]  Phone: [phone] - Fax: [phone]        October 25, 2023       Patient: Tere Gallegos   YOB: 1950   Date of Visit: 10/25/2023         To Whom It May Concern:    In my medical opinion, I recommend that Tere Gallegos continue drinking Nepro with Carbsteady therapeutic nutrition drinks nightly, please continue to dispense these to her.     If you have any questions or concerns, please don't hesitate to call [phone]        Sincerely,          ANGELITA Concepcion  Electronically Signed

## 2023-10-25 NOTE — PROGRESS NOTES
Subjective:   Tere Gallegos is a 72 y.o. female here today for dysuria, fatigue    Infection due to ESBL-producing Escherichia coli  Patient recently hospitalized for UTI, received IV antibiotics and reports symptoms completely resolved.     Today she reports that she has started getting painful urination again which started 1-2 days ago.     She     Acquired hypothyroidism  Chronic, followed by endo. Taking levothyroxine 75 mcg daily. Reports significant fatigue. Last TSH 2.78 last month, no T4 done at that time.      Current medicines (including changes today)  Current Outpatient Medications   Medication Sig Dispense Refill    ELIQUIS 5 MG Tab TAKE 1 TABLET BY MOUTH TWICE A  Tablet 1    tacrolimus (PROGRAF) 1 MG Cap Take 2 Capsules by mouth 2 times a day. 120 Capsule 0    losartan (COZAAR) 100 MG Tab Take 100 mg by mouth every day.      furosemide (LASIX) 80 MG Tab Take 80 mg by mouth every day.      amLODIPine (NORVASC) 5 MG Tab Take 1 Tablet by mouth every day. 90 Tablet 3    sodium bicarbonate (SODIUM BICARBONATE) 650 MG Tab Take 2 Tablets by mouth 2 times a day. 120 Tablet 3    mycophenolate (CELLCEPT) 250 MG Cap Take 1 Capsule by mouth 2 times a day. 20 Capsule 0    levothyroxine (SYNTHROID) 75 MCG Tab Take 1 Tablet by mouth every morning on an empty stomach. 90 Tablet 4    atorvastatin (LIPITOR) 20 MG Tab Take 1 Tablet by mouth every evening. 100 Tablet 3    insulin aspart (NOVOLOG FLEXPEN) 100 UNIT/ML injection PEN Inject 0-12 Units under the skin 3 times a day before meals. Unspecified sliding scale.      predniSONE (DELTASONE) 5 MG Tab Take 5 mg by mouth every day.       No current facility-administered medications for this visit.     She  has a past medical history of Acquired hypothyroidism (05/04/2020), CAD (coronary artery disease), Chronic diastolic heart failure (HCC) (05/04/2020), Coronary artery disease due to lipid rich plaque, Dental disorder, Diabetes (HCC), ESRD (end stage  "renal disease) on dialysis ([facility]) (05/04/2020), Hemodialysis patient ([facility]), Hyperlipidemia, Hypertension, Kidney transplant candidate, Kidney transplant recipient (10/31/2022), Presence of drug-eluting stent in right coronary artery, QT prolongation (01/22/2020), RLS (restless legs syndrome) (08/05/2016), and Transaminitis (12/22/2018).    ROS   No chest pain, no shortness of breath, no abdominal pain  Positive ROS as per HPI.  All other systems reviewed and are negative.     Objective:     /42   Pulse (!) 56   Temp 36.1 °C (97 °F) (Temporal)   Ht 1.549 m (5' 1\")   Wt 59 kg (130 lb)   SpO2 96%  Body mass index is 24.56 kg/m².     Physical Exam:  Constitutional: Alert, no distress.  Skin: Warm, dry, good turgor, no rashes in visible areas.  Eye: Equal, round and reactive, conjunctiva clear, lids normal.  ENMT: Lips without lesions, good dentition  Respiratory: Unlabored respiratory effort  Psych: Alert and oriented x3, normal affect and mood.        Assessment and Plan:   The following treatment plan was discussed    1. Infection due to ESBL-producing Escherichia coli  Stable after hospitalization and treatment.  Having dysuria the past 1 to 2 days, repeat UA today shows small blood small leuks, negative nitrites.  We will check culture    2. Dysuria  Unstable  As above, check culture prior to ordering antibiotics due to history of resistant bacteria  - POCT Urinalysis  - URINE CULTURE(NEW); Future    3. Acquired hypothyroidism  Stable on current medication, repeat thyroid labs, patient may be amenable to increasing dose.  - FREE THYROXINE; Future  - TSH; Future  - TRIIDOTHYRONINE; Future      Followup: Return in about 3 months (around 1/25/2024).    The MA is performing the above orders under the direction of Dr. Priest    Please note that this dictation was created using voice recognition software. I have made every reasonable attempt to correct obvious errors, but I expect that there are errors of " grammar and possibly content that I did not discover before finalizing the note.

## 2023-10-25 NOTE — ASSESSMENT & PLAN NOTE
Chronic, followed by endo. Taking levothyroxine 75 mcg daily. Reports significant fatigue. Last TSH 2.78 last month, no T4 done at that time.

## 2023-10-26 ENCOUNTER — TELEPHONE (OUTPATIENT)
Dept: MEDICAL GROUP | Facility: MEDICAL CENTER | Age: 73
End: 2023-10-26
Payer: MEDICARE

## 2023-10-30 ENCOUNTER — HOSPITAL ENCOUNTER (OUTPATIENT)
Dept: LAB | Facility: MEDICAL CENTER | Age: 73
End: 2023-10-30
Attending: INTERNAL MEDICINE
Payer: MEDICARE

## 2023-10-30 LAB
ANION GAP SERPL CALC-SCNC: 8 MMOL/L (ref 7–16)
BASOPHILS # BLD AUTO: 0.5 % (ref 0–1.8)
BASOPHILS # BLD: 0.02 K/UL (ref 0–0.12)
BUN SERPL-MCNC: 30 MG/DL (ref 8–22)
CALCIUM SERPL-MCNC: 10.3 MG/DL (ref 8.4–10.2)
CHLORIDE SERPL-SCNC: 108 MMOL/L (ref 96–112)
CO2 SERPL-SCNC: 23 MMOL/L (ref 20–33)
CREAT SERPL-MCNC: 0.99 MG/DL (ref 0.5–1.4)
EOSINOPHIL # BLD AUTO: 0.03 K/UL (ref 0–0.51)
EOSINOPHIL NFR BLD: 0.8 % (ref 0–6.9)
ERYTHROCYTE [DISTWIDTH] IN BLOOD BY AUTOMATED COUNT: 49.1 FL (ref 35.9–50)
GFR SERPLBLD CREATININE-BSD FMLA CKD-EPI: 60 ML/MIN/1.73 M 2
GLUCOSE SERPL-MCNC: 103 MG/DL (ref 65–99)
HCT VFR BLD AUTO: 42.1 % (ref 37–47)
HGB BLD-MCNC: 13.1 G/DL (ref 12–16)
IMM GRANULOCYTES # BLD AUTO: 0.02 K/UL (ref 0–0.11)
IMM GRANULOCYTES NFR BLD AUTO: 0.5 % (ref 0–0.9)
LYMPHOCYTES # BLD AUTO: 0.77 K/UL (ref 1–4.8)
LYMPHOCYTES NFR BLD: 20.1 % (ref 22–41)
MCH RBC QN AUTO: 28.2 PG (ref 27–33)
MCHC RBC AUTO-ENTMCNC: 31.1 G/DL (ref 32.2–35.5)
MCV RBC AUTO: 90.7 FL (ref 81.4–97.8)
MONOCYTES # BLD AUTO: 0.29 K/UL (ref 0–0.85)
MONOCYTES NFR BLD AUTO: 7.6 % (ref 0–13.4)
NEUTROPHILS # BLD AUTO: 2.7 K/UL (ref 1.82–7.42)
NEUTROPHILS NFR BLD: 70.5 % (ref 44–72)
NRBC # BLD AUTO: 0 K/UL
NRBC BLD-RTO: 0 /100 WBC (ref 0–0.2)
PHOSPHATE SERPL-MCNC: 2.9 MG/DL (ref 2.5–4.5)
PLATELET # BLD AUTO: 102 K/UL (ref 164–446)
PMV BLD AUTO: 12 FL (ref 9–12.9)
POTASSIUM SERPL-SCNC: 4.8 MMOL/L (ref 3.6–5.5)
RBC # BLD AUTO: 4.64 M/UL (ref 4.2–5.4)
SODIUM SERPL-SCNC: 139 MMOL/L (ref 135–145)
TACROLIMUS BLD-MCNC: 4.5 NG/ML (ref 5–20)
WBC # BLD AUTO: 3.8 K/UL (ref 4.8–10.8)

## 2023-10-30 PROCEDURE — 80048 BASIC METABOLIC PNL TOTAL CA: CPT

## 2023-10-30 PROCEDURE — 85025 COMPLETE CBC W/AUTO DIFF WBC: CPT

## 2023-10-30 PROCEDURE — 80197 ASSAY OF TACROLIMUS: CPT

## 2023-10-30 PROCEDURE — 36415 COLL VENOUS BLD VENIPUNCTURE: CPT

## 2023-10-30 PROCEDURE — 84100 ASSAY OF PHOSPHORUS: CPT

## 2023-11-06 ENCOUNTER — HOSPITAL ENCOUNTER (OUTPATIENT)
Dept: LAB | Facility: MEDICAL CENTER | Age: 73
End: 2023-11-06
Attending: INTERNAL MEDICINE
Payer: MEDICARE

## 2023-11-06 LAB
ALBUMIN SERPL BCP-MCNC: 4.3 G/DL (ref 3.2–4.9)
ALBUMIN/GLOB SERPL: 1.6 G/DL
ALP SERPL-CCNC: 75 U/L (ref 30–99)
ALT SERPL-CCNC: 19 U/L (ref 2–50)
ANION GAP SERPL CALC-SCNC: 10 MMOL/L (ref 7–16)
APPEARANCE UR: ABNORMAL
AST SERPL-CCNC: 19 U/L (ref 12–45)
BACTERIA #/AREA URNS HPF: ABNORMAL /HPF
BASOPHILS # BLD AUTO: 0.3 % (ref 0–1.8)
BASOPHILS # BLD: 0.01 K/UL (ref 0–0.12)
BILIRUB SERPL-MCNC: 0.5 MG/DL (ref 0.1–1.5)
BILIRUB UR QL STRIP.AUTO: NEGATIVE
BUN SERPL-MCNC: 23 MG/DL (ref 8–22)
CALCIUM ALBUM COR SERPL-MCNC: 10 MG/DL (ref 8.5–10.5)
CALCIUM SERPL-MCNC: 10.2 MG/DL (ref 8.4–10.2)
CHLORIDE SERPL-SCNC: 107 MMOL/L (ref 96–112)
CO2 SERPL-SCNC: 22 MMOL/L (ref 20–33)
COLOR UR: YELLOW
CREAT SERPL-MCNC: 1 MG/DL (ref 0.5–1.4)
EOSINOPHIL # BLD AUTO: 0.02 K/UL (ref 0–0.51)
EOSINOPHIL NFR BLD: 0.6 % (ref 0–6.9)
EPI CELLS #/AREA URNS HPF: ABNORMAL /HPF
ERYTHROCYTE [DISTWIDTH] IN BLOOD BY AUTOMATED COUNT: 47.8 FL (ref 35.9–50)
GFR SERPLBLD CREATININE-BSD FMLA CKD-EPI: 59 ML/MIN/1.73 M 2
GLOBULIN SER CALC-MCNC: 2.7 G/DL (ref 1.9–3.5)
GLUCOSE SERPL-MCNC: 90 MG/DL (ref 65–99)
GLUCOSE UR STRIP.AUTO-MCNC: NEGATIVE MG/DL
HCT VFR BLD AUTO: 41.8 % (ref 37–47)
HGB BLD-MCNC: 12.9 G/DL (ref 12–16)
IMM GRANULOCYTES # BLD AUTO: 0.01 K/UL (ref 0–0.11)
IMM GRANULOCYTES NFR BLD AUTO: 0.3 % (ref 0–0.9)
KETONES UR STRIP.AUTO-MCNC: NEGATIVE MG/DL
LEUKOCYTE ESTERASE UR QL STRIP.AUTO: ABNORMAL
LYMPHOCYTES # BLD AUTO: 0.74 K/UL (ref 1–4.8)
LYMPHOCYTES NFR BLD: 22.8 % (ref 22–41)
MCH RBC QN AUTO: 27.7 PG (ref 27–33)
MCHC RBC AUTO-ENTMCNC: 30.9 G/DL (ref 32.2–35.5)
MCV RBC AUTO: 89.9 FL (ref 81.4–97.8)
MICRO URNS: ABNORMAL
MONOCYTES # BLD AUTO: 0.25 K/UL (ref 0–0.85)
MONOCYTES NFR BLD AUTO: 7.7 % (ref 0–13.4)
NEUTROPHILS # BLD AUTO: 2.21 K/UL (ref 1.82–7.42)
NEUTROPHILS NFR BLD: 68.3 % (ref 44–72)
NITRITE UR QL STRIP.AUTO: POSITIVE
NRBC # BLD AUTO: 0 K/UL
NRBC BLD-RTO: 0 /100 WBC (ref 0–0.2)
PH UR STRIP.AUTO: 6.5 [PH] (ref 5–8)
PLATELET # BLD AUTO: 108 K/UL (ref 164–446)
PMV BLD AUTO: 11.8 FL (ref 9–12.9)
POTASSIUM SERPL-SCNC: 4.5 MMOL/L (ref 3.6–5.5)
PROT SERPL-MCNC: 7 G/DL (ref 6–8.2)
PROT UR QL STRIP: NEGATIVE MG/DL
RBC # BLD AUTO: 4.65 M/UL (ref 4.2–5.4)
RBC # URNS HPF: ABNORMAL /HPF
RBC UR QL AUTO: NEGATIVE
SODIUM SERPL-SCNC: 139 MMOL/L (ref 135–145)
SP GR UR STRIP.AUTO: 1.01
TACROLIMUS BLD-MCNC: 4.5 NG/ML (ref 5–20)
WBC # BLD AUTO: 3.2 K/UL (ref 4.8–10.8)
WBC #/AREA URNS HPF: ABNORMAL /HPF

## 2023-11-06 PROCEDURE — 81001 URINALYSIS AUTO W/SCOPE: CPT

## 2023-11-06 PROCEDURE — 85025 COMPLETE CBC W/AUTO DIFF WBC: CPT

## 2023-11-06 PROCEDURE — 80197 ASSAY OF TACROLIMUS: CPT

## 2023-11-06 PROCEDURE — 87186 SC STD MICRODIL/AGAR DIL: CPT

## 2023-11-06 PROCEDURE — 87086 URINE CULTURE/COLONY COUNT: CPT

## 2023-11-06 PROCEDURE — 80053 COMPREHEN METABOLIC PANEL: CPT

## 2023-11-06 PROCEDURE — 87799 DETECT AGENT NOS DNA QUANT: CPT

## 2023-11-06 PROCEDURE — 36415 COLL VENOUS BLD VENIPUNCTURE: CPT

## 2023-11-06 PROCEDURE — 87077 CULTURE AEROBIC IDENTIFY: CPT

## 2023-11-07 LAB
BK PLASMA INTERP, QNT NAAT NL11711: DETECTED
BK PLASMA IU/ML, QNT NAAT NL11709: 322 IU/ML
BK PLASMA LOG IU/ML, QNT NAAT NL11710: 2.51 LOG IU/ML
BK UR INTERP, QNT NAAT NL11708: DETECTED
BK UR IU/ML, QNT NAAT NL11706: ABNORMAL IU/ML
BK UR LOG IU/ML, QNT NAAT NL11707: 5.39 LOG IU/ML

## 2023-11-13 ENCOUNTER — HOSPITAL ENCOUNTER (OUTPATIENT)
Dept: LAB | Facility: MEDICAL CENTER | Age: 73
End: 2023-11-13
Attending: INTERNAL MEDICINE
Payer: MEDICARE

## 2023-11-13 LAB
ANION GAP SERPL CALC-SCNC: 10 MMOL/L (ref 7–16)
BASOPHILS # BLD AUTO: 0.5 % (ref 0–1.8)
BASOPHILS # BLD: 0.02 K/UL (ref 0–0.12)
BUN SERPL-MCNC: 29 MG/DL (ref 8–22)
CALCIUM SERPL-MCNC: 9.9 MG/DL (ref 8.4–10.2)
CHLORIDE SERPL-SCNC: 107 MMOL/L (ref 96–112)
CO2 SERPL-SCNC: 23 MMOL/L (ref 20–33)
CREAT SERPL-MCNC: 1.1 MG/DL (ref 0.5–1.4)
EOSINOPHIL # BLD AUTO: 0.02 K/UL (ref 0–0.51)
EOSINOPHIL NFR BLD: 0.5 % (ref 0–6.9)
ERYTHROCYTE [DISTWIDTH] IN BLOOD BY AUTOMATED COUNT: 46.1 FL (ref 35.9–50)
GFR SERPLBLD CREATININE-BSD FMLA CKD-EPI: 53 ML/MIN/1.73 M 2
GLUCOSE SERPL-MCNC: 116 MG/DL (ref 65–99)
HCT VFR BLD AUTO: 41.5 % (ref 37–47)
HGB BLD-MCNC: 13 G/DL (ref 12–16)
IMM GRANULOCYTES # BLD AUTO: 0.02 K/UL (ref 0–0.11)
IMM GRANULOCYTES NFR BLD AUTO: 0.5 % (ref 0–0.9)
LYMPHOCYTES # BLD AUTO: 0.81 K/UL (ref 1–4.8)
LYMPHOCYTES NFR BLD: 21.3 % (ref 22–41)
MCH RBC QN AUTO: 27.9 PG (ref 27–33)
MCHC RBC AUTO-ENTMCNC: 31.3 G/DL (ref 32.2–35.5)
MCV RBC AUTO: 89.1 FL (ref 81.4–97.8)
MONOCYTES # BLD AUTO: 0.34 K/UL (ref 0–0.85)
MONOCYTES NFR BLD AUTO: 8.9 % (ref 0–13.4)
NEUTROPHILS # BLD AUTO: 2.6 K/UL (ref 1.82–7.42)
NEUTROPHILS NFR BLD: 68.3 % (ref 44–72)
NRBC # BLD AUTO: 0 K/UL
NRBC BLD-RTO: 0 /100 WBC (ref 0–0.2)
PHOSPHATE SERPL-MCNC: 2.9 MG/DL (ref 2.5–4.5)
PLATELET # BLD AUTO: 111 K/UL (ref 164–446)
PMV BLD AUTO: 11.9 FL (ref 9–12.9)
POTASSIUM SERPL-SCNC: 4.5 MMOL/L (ref 3.6–5.5)
RBC # BLD AUTO: 4.66 M/UL (ref 4.2–5.4)
SODIUM SERPL-SCNC: 140 MMOL/L (ref 135–145)
TACROLIMUS BLD-MCNC: 2.5 NG/ML (ref 5–20)
WBC # BLD AUTO: 3.8 K/UL (ref 4.8–10.8)

## 2023-11-13 PROCEDURE — 80048 BASIC METABOLIC PNL TOTAL CA: CPT

## 2023-11-13 PROCEDURE — 80197 ASSAY OF TACROLIMUS: CPT

## 2023-11-13 PROCEDURE — 85025 COMPLETE CBC W/AUTO DIFF WBC: CPT

## 2023-11-13 PROCEDURE — 36415 COLL VENOUS BLD VENIPUNCTURE: CPT

## 2023-11-13 PROCEDURE — 84100 ASSAY OF PHOSPHORUS: CPT

## 2023-11-20 ENCOUNTER — TELEPHONE (OUTPATIENT)
Dept: VASCULAR LAB | Facility: MEDICAL CENTER | Age: 73
End: 2023-11-20
Payer: MEDICARE

## 2023-11-20 ENCOUNTER — HOSPITAL ENCOUNTER (OUTPATIENT)
Dept: LAB | Facility: MEDICAL CENTER | Age: 73
End: 2023-11-20
Attending: INTERNAL MEDICINE
Payer: MEDICARE

## 2023-11-20 LAB
ANION GAP SERPL CALC-SCNC: 11 MMOL/L (ref 7–16)
BASOPHILS # BLD AUTO: 0.5 % (ref 0–1.8)
BASOPHILS # BLD: 0.02 K/UL (ref 0–0.12)
BUN SERPL-MCNC: 27 MG/DL (ref 8–22)
CALCIUM SERPL-MCNC: 10 MG/DL (ref 8.4–10.2)
CHLORIDE SERPL-SCNC: 105 MMOL/L (ref 96–112)
CO2 SERPL-SCNC: 23 MMOL/L (ref 20–33)
CREAT SERPL-MCNC: 0.99 MG/DL (ref 0.5–1.4)
EOSINOPHIL # BLD AUTO: 0.03 K/UL (ref 0–0.51)
EOSINOPHIL NFR BLD: 0.8 % (ref 0–6.9)
ERYTHROCYTE [DISTWIDTH] IN BLOOD BY AUTOMATED COUNT: 47 FL (ref 35.9–50)
GFR SERPLBLD CREATININE-BSD FMLA CKD-EPI: 60 ML/MIN/1.73 M 2
GLUCOSE SERPL-MCNC: 104 MG/DL (ref 65–99)
HCT VFR BLD AUTO: 42 % (ref 37–47)
HGB BLD-MCNC: 13.1 G/DL (ref 12–16)
IMM GRANULOCYTES # BLD AUTO: 0.02 K/UL (ref 0–0.11)
IMM GRANULOCYTES NFR BLD AUTO: 0.5 % (ref 0–0.9)
LYMPHOCYTES # BLD AUTO: 0.73 K/UL (ref 1–4.8)
LYMPHOCYTES NFR BLD: 19.5 % (ref 22–41)
MCH RBC QN AUTO: 28.1 PG (ref 27–33)
MCHC RBC AUTO-ENTMCNC: 31.2 G/DL (ref 32.2–35.5)
MCV RBC AUTO: 89.9 FL (ref 81.4–97.8)
MONOCYTES # BLD AUTO: 0.36 K/UL (ref 0–0.85)
MONOCYTES NFR BLD AUTO: 9.6 % (ref 0–13.4)
NEUTROPHILS # BLD AUTO: 2.59 K/UL (ref 1.82–7.42)
NEUTROPHILS NFR BLD: 69.1 % (ref 44–72)
NRBC # BLD AUTO: 0 K/UL
NRBC BLD-RTO: 0 /100 WBC (ref 0–0.2)
PHOSPHATE SERPL-MCNC: 3.2 MG/DL (ref 2.5–4.5)
PLATELET # BLD AUTO: 102 K/UL (ref 164–446)
PMV BLD AUTO: 11.4 FL (ref 9–12.9)
POTASSIUM SERPL-SCNC: 4.5 MMOL/L (ref 3.6–5.5)
RBC # BLD AUTO: 4.67 M/UL (ref 4.2–5.4)
SODIUM SERPL-SCNC: 139 MMOL/L (ref 135–145)
WBC # BLD AUTO: 3.8 K/UL (ref 4.8–10.8)

## 2023-11-20 PROCEDURE — 80197 ASSAY OF TACROLIMUS: CPT

## 2023-11-20 PROCEDURE — 84100 ASSAY OF PHOSPHORUS: CPT

## 2023-11-20 PROCEDURE — 80048 BASIC METABOLIC PNL TOTAL CA: CPT

## 2023-11-20 PROCEDURE — 85025 COMPLETE CBC W/AUTO DIFF WBC: CPT

## 2023-11-20 PROCEDURE — 36415 COLL VENOUS BLD VENIPUNCTURE: CPT

## 2023-11-20 NOTE — TELEPHONE ENCOUNTER
[facility]    Pt missed their last appointment. Left VM for pt to reschedule.    [place] Main, PharmD

## 2023-11-21 LAB — TACROLIMUS BLD-MCNC: 1.7 NG/ML (ref 5–20)

## 2023-11-24 ENCOUNTER — HOSPITAL ENCOUNTER (INPATIENT)
Facility: MEDICAL CENTER | Age: 73
LOS: 1 days | DRG: 309 | End: 2023-11-25
Attending: EMERGENCY MEDICINE | Admitting: INTERNAL MEDICINE
Payer: MEDICARE

## 2023-11-24 ENCOUNTER — APPOINTMENT (OUTPATIENT)
Dept: RADIOLOGY | Facility: MEDICAL CENTER | Age: 73
DRG: 309 | End: 2023-11-24
Attending: EMERGENCY MEDICINE
Payer: MEDICARE

## 2023-11-24 DIAGNOSIS — I15.0 RENOVASCULAR HYPERTENSION: ICD-10-CM

## 2023-11-24 DIAGNOSIS — I48.0 PAROXYSMAL ATRIAL FIBRILLATION WITH RAPID VENTRICULAR RESPONSE (HCC): ICD-10-CM

## 2023-11-24 DIAGNOSIS — Z95.5 PRESENCE OF DRUG-ELUTING STENT IN RIGHT CORONARY ARTERY: Chronic | ICD-10-CM

## 2023-11-24 DIAGNOSIS — N18.6 ESRD (END STAGE RENAL DISEASE) (HCC): Chronic | ICD-10-CM

## 2023-11-24 DIAGNOSIS — E78.2 MIXED HYPERLIPIDEMIA: ICD-10-CM

## 2023-11-24 DIAGNOSIS — R07.9 ACUTE CHEST PAIN: ICD-10-CM

## 2023-11-24 DIAGNOSIS — Z94.0 KIDNEY TRANSPLANT RECIPIENT: Chronic | ICD-10-CM

## 2023-11-24 DIAGNOSIS — E08.00 DIABETES MELLITUS DUE TO UNDERLYING CONDITION WITH HYPEROSMOLARITY WITHOUT COMA, WITHOUT LONG-TERM CURRENT USE OF INSULIN (HCC): ICD-10-CM

## 2023-11-24 DIAGNOSIS — I48.91 ATRIAL FIBRILLATION WITH RAPID VENTRICULAR RESPONSE (HCC): ICD-10-CM

## 2023-11-24 PROBLEM — D68.318 ACQUIRED CIRCULATING ANTICOAGULANTS (HCC): Status: ACTIVE | Noted: 2023-11-24

## 2023-11-24 LAB
ALBUMIN SERPL BCP-MCNC: 4.4 G/DL (ref 3.2–4.9)
ALBUMIN/GLOB SERPL: 1.7 G/DL
ALP SERPL-CCNC: 77 U/L (ref 30–99)
ALT SERPL-CCNC: 26 U/L (ref 2–50)
ANION GAP SERPL CALC-SCNC: 14 MMOL/L (ref 7–16)
AST SERPL-CCNC: 25 U/L (ref 12–45)
BASOPHILS # BLD AUTO: 0.3 % (ref 0–1.8)
BASOPHILS # BLD: 0.02 K/UL (ref 0–0.12)
BILIRUB SERPL-MCNC: 0.4 MG/DL (ref 0.1–1.5)
BUN SERPL-MCNC: 29 MG/DL (ref 8–22)
CALCIUM ALBUM COR SERPL-MCNC: 9.8 MG/DL (ref 8.5–10.5)
CALCIUM SERPL-MCNC: 10.1 MG/DL (ref 8.5–10.5)
CHLORIDE SERPL-SCNC: 105 MMOL/L (ref 96–112)
CO2 SERPL-SCNC: 20 MMOL/L (ref 20–33)
CREAT SERPL-MCNC: 1.23 MG/DL (ref 0.5–1.4)
EKG IMPRESSION: NORMAL
EKG IMPRESSION: NORMAL
EOSINOPHIL # BLD AUTO: 0.04 K/UL (ref 0–0.51)
EOSINOPHIL NFR BLD: 0.7 % (ref 0–6.9)
ERYTHROCYTE [DISTWIDTH] IN BLOOD BY AUTOMATED COUNT: 47.7 FL (ref 35.9–50)
GFR SERPLBLD CREATININE-BSD FMLA CKD-EPI: 46 ML/MIN/1.73 M 2
GLOBULIN SER CALC-MCNC: 2.6 G/DL (ref 1.9–3.5)
GLUCOSE BLD STRIP.AUTO-MCNC: 128 MG/DL (ref 65–99)
GLUCOSE BLD STRIP.AUTO-MCNC: 195 MG/DL (ref 65–99)
GLUCOSE BLD STRIP.AUTO-MCNC: 200 MG/DL (ref 65–99)
GLUCOSE BLD STRIP.AUTO-MCNC: 226 MG/DL (ref 65–99)
GLUCOSE SERPL-MCNC: 120 MG/DL (ref 65–99)
HCT VFR BLD AUTO: 42.9 % (ref 37–47)
HGB BLD-MCNC: 13.7 G/DL (ref 12–16)
IMM GRANULOCYTES # BLD AUTO: 0.03 K/UL (ref 0–0.11)
IMM GRANULOCYTES NFR BLD AUTO: 0.5 % (ref 0–0.9)
LYMPHOCYTES # BLD AUTO: 0.75 K/UL (ref 1–4.8)
LYMPHOCYTES NFR BLD: 13 % (ref 22–41)
MAGNESIUM SERPL-MCNC: 1.7 MG/DL (ref 1.5–2.5)
MCH RBC QN AUTO: 28.1 PG (ref 27–33)
MCHC RBC AUTO-ENTMCNC: 31.9 G/DL (ref 32.2–35.5)
MCV RBC AUTO: 88.1 FL (ref 81.4–97.8)
MONOCYTES # BLD AUTO: 0.51 K/UL (ref 0–0.85)
MONOCYTES NFR BLD AUTO: 8.8 % (ref 0–13.4)
NEUTROPHILS # BLD AUTO: 4.43 K/UL (ref 1.82–7.42)
NEUTROPHILS NFR BLD: 76.7 % (ref 44–72)
NRBC # BLD AUTO: 0 K/UL
NRBC BLD-RTO: 0 /100 WBC (ref 0–0.2)
PLATELET # BLD AUTO: 105 K/UL (ref 164–446)
PMV BLD AUTO: 10.6 FL (ref 9–12.9)
POTASSIUM SERPL-SCNC: 3.9 MMOL/L (ref 3.6–5.5)
PROT SERPL-MCNC: 7 G/DL (ref 6–8.2)
RBC # BLD AUTO: 4.87 M/UL (ref 4.2–5.4)
SODIUM SERPL-SCNC: 139 MMOL/L (ref 135–145)
TACROLIMUS BLD-MCNC: 2.1 NG/ML (ref 5–20)
TROPONIN T SERPL-MCNC: 44 NG/L (ref 6–19)
TROPONIN T SERPL-MCNC: 53 NG/L (ref 6–19)
TROPONIN T SERPL-MCNC: 56 NG/L (ref 6–19)
TROPONIN T SERPL-MCNC: 60 NG/L (ref 6–19)
TROPONIN T SERPL-MCNC: 61 NG/L (ref 6–19)
TSH SERPL DL<=0.005 MIU/L-ACNC: 2.74 UIU/ML (ref 0.38–5.33)
WBC # BLD AUTO: 5.8 K/UL (ref 4.8–10.8)

## 2023-11-24 PROCEDURE — 96375 TX/PRO/DX INJ NEW DRUG ADDON: CPT

## 2023-11-24 PROCEDURE — 700102 HCHG RX REV CODE 250 W/ 637 OVERRIDE(OP): Performed by: EMERGENCY MEDICINE

## 2023-11-24 PROCEDURE — 93005 ELECTROCARDIOGRAM TRACING: CPT | Performed by: INTERNAL MEDICINE

## 2023-11-24 PROCEDURE — 93005 ELECTROCARDIOGRAM TRACING: CPT

## 2023-11-24 PROCEDURE — 36415 COLL VENOUS BLD VENIPUNCTURE: CPT

## 2023-11-24 PROCEDURE — 84443 ASSAY THYROID STIM HORMONE: CPT

## 2023-11-24 PROCEDURE — 770020 HCHG ROOM/CARE - TELE (206)

## 2023-11-24 PROCEDURE — 700105 HCHG RX REV CODE 258: Performed by: INTERNAL MEDICINE

## 2023-11-24 PROCEDURE — 99222 1ST HOSP IP/OBS MODERATE 55: CPT | Performed by: INTERNAL MEDICINE

## 2023-11-24 PROCEDURE — 85025 COMPLETE CBC W/AUTO DIFF WBC: CPT

## 2023-11-24 PROCEDURE — 99285 EMERGENCY DEPT VISIT HI MDM: CPT

## 2023-11-24 PROCEDURE — 80197 ASSAY OF TACROLIMUS: CPT

## 2023-11-24 PROCEDURE — 93005 ELECTROCARDIOGRAM TRACING: CPT | Performed by: EMERGENCY MEDICINE

## 2023-11-24 PROCEDURE — 99223 1ST HOSP IP/OBS HIGH 75: CPT | Mod: AI | Performed by: INTERNAL MEDICINE

## 2023-11-24 PROCEDURE — 83735 ASSAY OF MAGNESIUM: CPT

## 2023-11-24 PROCEDURE — 82962 GLUCOSE BLOOD TEST: CPT

## 2023-11-24 PROCEDURE — 96374 THER/PROPH/DIAG INJ IV PUSH: CPT

## 2023-11-24 PROCEDURE — 80053 COMPREHEN METABOLIC PANEL: CPT

## 2023-11-24 PROCEDURE — 71045 X-RAY EXAM CHEST 1 VIEW: CPT

## 2023-11-24 PROCEDURE — 700111 HCHG RX REV CODE 636 W/ 250 OVERRIDE (IP): Performed by: INTERNAL MEDICINE

## 2023-11-24 PROCEDURE — 700102 HCHG RX REV CODE 250 W/ 637 OVERRIDE(OP): Performed by: INTERNAL MEDICINE

## 2023-11-24 PROCEDURE — 84484 ASSAY OF TROPONIN QUANT: CPT

## 2023-11-24 PROCEDURE — A9270 NON-COVERED ITEM OR SERVICE: HCPCS | Performed by: EMERGENCY MEDICINE

## 2023-11-24 PROCEDURE — 700111 HCHG RX REV CODE 636 W/ 250 OVERRIDE (IP): Mod: UD | Performed by: EMERGENCY MEDICINE

## 2023-11-24 PROCEDURE — A9270 NON-COVERED ITEM OR SERVICE: HCPCS | Performed by: INTERNAL MEDICINE

## 2023-11-24 RX ORDER — OXYCODONE HYDROCHLORIDE 5 MG/1
5 TABLET ORAL
Status: DISCONTINUED | OUTPATIENT
Start: 2023-11-24 | End: 2023-11-25 | Stop reason: HOSPADM

## 2023-11-24 RX ORDER — TACROLIMUS 1 MG/1
1 CAPSULE ORAL EVERY MORNING
COMMUNITY
End: 2023-12-15

## 2023-11-24 RX ORDER — TACROLIMUS 1 MG/1
1 CAPSULE ORAL DAILY
Status: DISCONTINUED | OUTPATIENT
Start: 2023-11-24 | End: 2023-11-24

## 2023-11-24 RX ORDER — ACETAMINOPHEN 325 MG/1
650 TABLET ORAL EVERY 6 HOURS PRN
Status: DISCONTINUED | OUTPATIENT
Start: 2023-11-24 | End: 2023-11-25 | Stop reason: HOSPADM

## 2023-11-24 RX ORDER — LABETALOL HYDROCHLORIDE 5 MG/ML
10 INJECTION, SOLUTION INTRAVENOUS EVERY 4 HOURS PRN
Status: DISCONTINUED | OUTPATIENT
Start: 2023-11-24 | End: 2023-11-25 | Stop reason: HOSPADM

## 2023-11-24 RX ORDER — AMOXICILLIN 250 MG
2 CAPSULE ORAL 2 TIMES DAILY
Status: DISCONTINUED | OUTPATIENT
Start: 2023-11-24 | End: 2023-11-25 | Stop reason: HOSPADM

## 2023-11-24 RX ORDER — BISACODYL 10 MG
10 SUPPOSITORY, RECTAL RECTAL
Status: DISCONTINUED | OUTPATIENT
Start: 2023-11-24 | End: 2023-11-25 | Stop reason: HOSPADM

## 2023-11-24 RX ORDER — DEXTROSE MONOHYDRATE 50 MG/ML
INJECTION, SOLUTION INTRAVENOUS CONTINUOUS
Status: DISCONTINUED | OUTPATIENT
Start: 2023-11-24 | End: 2023-11-25 | Stop reason: HOSPADM

## 2023-11-24 RX ORDER — HYDROMORPHONE HYDROCHLORIDE 1 MG/ML
0.25 INJECTION, SOLUTION INTRAMUSCULAR; INTRAVENOUS; SUBCUTANEOUS
Status: DISCONTINUED | OUTPATIENT
Start: 2023-11-24 | End: 2023-11-25 | Stop reason: HOSPADM

## 2023-11-24 RX ORDER — DIGOXIN 0.25 MG/ML
250 INJECTION INTRAMUSCULAR; INTRAVENOUS ONCE
Status: COMPLETED | OUTPATIENT
Start: 2023-11-24 | End: 2023-11-24

## 2023-11-24 RX ORDER — ATORVASTATIN CALCIUM 20 MG/1
20 TABLET, FILM COATED ORAL NIGHTLY
Status: DISCONTINUED | OUTPATIENT
Start: 2023-11-24 | End: 2023-11-25 | Stop reason: HOSPADM

## 2023-11-24 RX ORDER — DILTIAZEM HYDROCHLORIDE 5 MG/ML
0.25 INJECTION INTRAVENOUS ONCE
Status: COMPLETED | OUTPATIENT
Start: 2023-11-24 | End: 2023-11-24

## 2023-11-24 RX ORDER — POLYETHYLENE GLYCOL 3350 17 G/17G
1 POWDER, FOR SOLUTION ORAL
Status: DISCONTINUED | OUTPATIENT
Start: 2023-11-24 | End: 2023-11-25 | Stop reason: HOSPADM

## 2023-11-24 RX ORDER — SODIUM CHLORIDE 9 MG/ML
500 INJECTION, SOLUTION INTRAVENOUS ONCE
Status: ACTIVE | OUTPATIENT
Start: 2023-11-24 | End: 2023-11-25

## 2023-11-24 RX ORDER — MYCOPHENOLATE MOFETIL 250 MG/1
250 CAPSULE ORAL 2 TIMES DAILY
Status: DISCONTINUED | OUTPATIENT
Start: 2023-11-24 | End: 2023-11-25 | Stop reason: HOSPADM

## 2023-11-24 RX ORDER — OXYCODONE HYDROCHLORIDE 5 MG/1
2.5 TABLET ORAL
Status: DISCONTINUED | OUTPATIENT
Start: 2023-11-24 | End: 2023-11-25 | Stop reason: HOSPADM

## 2023-11-24 RX ORDER — FUROSEMIDE 40 MG/1
80 TABLET ORAL DAILY
Status: DISCONTINUED | OUTPATIENT
Start: 2023-11-24 | End: 2023-11-24

## 2023-11-24 RX ORDER — TACROLIMUS 1 MG/1
2 CAPSULE ORAL 2 TIMES DAILY
Status: DISCONTINUED | OUTPATIENT
Start: 2023-11-24 | End: 2023-11-25 | Stop reason: HOSPADM

## 2023-11-24 RX ORDER — PREDNISONE 10 MG/1
5 TABLET ORAL DAILY
Status: DISCONTINUED | OUTPATIENT
Start: 2023-11-24 | End: 2023-11-25 | Stop reason: HOSPADM

## 2023-11-24 RX ORDER — SODIUM BICARBONATE 650 MG/1
1300 TABLET ORAL 2 TIMES DAILY
Status: DISCONTINUED | OUTPATIENT
Start: 2023-11-24 | End: 2023-11-25 | Stop reason: HOSPADM

## 2023-11-24 RX ORDER — LEVOTHYROXINE SODIUM 0.07 MG/1
75 TABLET ORAL
Status: DISCONTINUED | OUTPATIENT
Start: 2023-11-24 | End: 2023-11-25 | Stop reason: HOSPADM

## 2023-11-24 RX ORDER — ONDANSETRON 2 MG/ML
4 INJECTION INTRAMUSCULAR; INTRAVENOUS EVERY 4 HOURS PRN
Status: DISCONTINUED | OUTPATIENT
Start: 2023-11-24 | End: 2023-11-25 | Stop reason: HOSPADM

## 2023-11-24 RX ORDER — LOSARTAN POTASSIUM 100 MG/1
100 TABLET ORAL DAILY
Status: DISCONTINUED | OUTPATIENT
Start: 2023-11-24 | End: 2023-11-24

## 2023-11-24 RX ORDER — ONDANSETRON 4 MG/1
4 TABLET, ORALLY DISINTEGRATING ORAL EVERY 4 HOURS PRN
Status: DISCONTINUED | OUTPATIENT
Start: 2023-11-24 | End: 2023-11-25 | Stop reason: HOSPADM

## 2023-11-24 RX ORDER — AMLODIPINE BESYLATE 5 MG/1
5 TABLET ORAL DAILY
Status: DISCONTINUED | OUTPATIENT
Start: 2023-11-24 | End: 2023-11-24

## 2023-11-24 RX ADMIN — AMIODARONE HYDROCHLORIDE 0.5 MG/MIN: 1.8 INJECTION, SOLUTION INTRAVENOUS at 17:07

## 2023-11-24 RX ADMIN — APIXABAN 5 MG: 5 TABLET, FILM COATED ORAL at 17:31

## 2023-11-24 RX ADMIN — METOPROLOL TARTRATE 25 MG: 25 TABLET, FILM COATED ORAL at 08:09

## 2023-11-24 RX ADMIN — SODIUM BICARBONATE 1300 MG: 650 TABLET ORAL at 09:48

## 2023-11-24 RX ADMIN — AMIODARONE HYDROCHLORIDE 150 MG: 1.5 INJECTION, SOLUTION INTRAVENOUS at 09:30

## 2023-11-24 RX ADMIN — APIXABAN 5 MG: 5 TABLET, FILM COATED ORAL at 09:48

## 2023-11-24 RX ADMIN — INSULIN HUMAN 2 UNITS: 100 INJECTION, SOLUTION PARENTERAL at 17:34

## 2023-11-24 RX ADMIN — AMIODARONE HYDROCHLORIDE 1 MG/MIN: 1.8 INJECTION, SOLUTION INTRAVENOUS at 09:21

## 2023-11-24 RX ADMIN — TACROLIMUS 2 MG: 1 CAPSULE ORAL at 17:30

## 2023-11-24 RX ADMIN — LEVOTHYROXINE SODIUM 75 MCG: 0.07 TABLET ORAL at 09:48

## 2023-11-24 RX ADMIN — MYCOPHENOLATE MOFETIL 250 MG: 250 CAPSULE ORAL at 20:54

## 2023-11-24 RX ADMIN — SODIUM BICARBONATE 1300 MG: 650 TABLET ORAL at 17:30

## 2023-11-24 RX ADMIN — TACROLIMUS 1 MG: 1 CAPSULE ORAL at 09:48

## 2023-11-24 RX ADMIN — DEXTROSE MONOHYDRATE: 50 INJECTION, SOLUTION INTRAVENOUS at 09:24

## 2023-11-24 RX ADMIN — AMIODARONE HYDROCHLORIDE 1 MG/MIN: 1.8 INJECTION, SOLUTION INTRAVENOUS at 09:47

## 2023-11-24 RX ADMIN — INSULIN HUMAN 2 UNITS: 100 INJECTION, SOLUTION PARENTERAL at 20:56

## 2023-11-24 RX ADMIN — AMLODIPINE BESYLATE 5 MG: 5 TABLET ORAL at 09:52

## 2023-11-24 RX ADMIN — DIGOXIN 250 MCG: 0.25 INJECTION INTRAMUSCULAR; INTRAVENOUS at 06:36

## 2023-11-24 RX ADMIN — DILTIAZEM HYDROCHLORIDE 15.15 MG: 5 INJECTION INTRAVENOUS at 05:26

## 2023-11-24 RX ADMIN — PREDNISONE 5 MG: 10 TABLET ORAL at 09:48

## 2023-11-24 RX ADMIN — ATORVASTATIN CALCIUM 20 MG: 20 TABLET, FILM COATED ORAL at 20:54

## 2023-11-24 RX ADMIN — AMIODARONE HYDROCHLORIDE 0.5 MG/MIN: 1.8 INJECTION, SOLUTION INTRAVENOUS at 15:11

## 2023-11-24 RX ADMIN — MYCOPHENOLATE MOFETIL 250 MG: 250 CAPSULE ORAL at 09:48

## 2023-11-24 RX ADMIN — INSULIN HUMAN 3 UNITS: 100 INJECTION, SOLUTION PARENTERAL at 12:15

## 2023-11-24 ASSESSMENT — LIFESTYLE VARIABLES
EVER FELT BAD OR GUILTY ABOUT YOUR DRINKING: NO
HOW MANY TIMES IN THE PAST YEAR HAVE YOU HAD 5 OR MORE DRINKS IN A DAY: 0
HAVE PEOPLE ANNOYED YOU BY CRITICIZING YOUR DRINKING: NO
DOES PATIENT WANT TO STOP DRINKING: NO
CONSUMPTION TOTAL: NEGATIVE
EVER HAD A DRINK FIRST THING IN THE MORNING TO STEADY YOUR NERVES TO GET RID OF A HANGOVER: NO
HAVE YOU EVER FELT YOU SHOULD CUT DOWN ON YOUR DRINKING: NO
ALCOHOL_USE: NO
AVERAGE NUMBER OF DAYS PER WEEK YOU HAVE A DRINK CONTAINING ALCOHOL: 0
ON A TYPICAL DAY WHEN YOU DRINK ALCOHOL HOW MANY DRINKS DO YOU HAVE: 0
TOTAL SCORE: 0

## 2023-11-24 ASSESSMENT — COPD QUESTIONNAIRES
COPD SCREENING SCORE: 2
DURING THE PAST 4 WEEKS HOW MUCH DID YOU FEEL SHORT OF BREATH: NONE/LITTLE OF THE TIME
DO YOU EVER COUGH UP ANY MUCUS OR PHLEGM?: NO/ONLY WITH OCCASIONAL COLDS OR INFECTIONS
HAVE YOU SMOKED AT LEAST 100 CIGARETTES IN YOUR ENTIRE LIFE: NO/DON'T KNOW

## 2023-11-24 ASSESSMENT — CHA2DS2 SCORE
HYPERTENSION: NO
VASCULAR DISEASE: YES
AGE 75 OR GREATER: NO
PRIOR STROKE OR TIA OR THROMBOEMBOLISM: NO
SEX: FEMALE
CHF OR LEFT VENTRICULAR DYSFUNCTION: YES
AGE 65 TO 74: YES
DIABETES: YES
CHA2DS2 VASC SCORE: 5

## 2023-11-24 ASSESSMENT — PAIN DESCRIPTION - PAIN TYPE: TYPE: ACUTE PAIN

## 2023-11-24 ASSESSMENT — FIBROSIS 4 INDEX: FIB4 SCORE: 3.12

## 2023-11-24 NOTE — CONSULTS
Cardiology Initial Consult Note    Reason for Consult:  Asked by Dr Klaus Campa M.D. to see this patient with  AF with RVR  Patient's PCP: ANGELITA Concepcion    CC:   Chief Complaint   Patient presents with    Chest Pain     Left sided chest pain radiating to the left arm and jaw. Started around 2 am.       HPI:   This is a 72-year-old woman with past medical history significant for heart failure with preserved ejection fraction, CAD, CKD status post renal transplant in October 2022, history of persistent atrial fibrillation on Eliquis who presented to the hospital with acute onset palpitations and chest discomfort earlier this morning.  She denies having dyspnea, lightheadedness versus dizziness or syncope.  States that she woke up at around 2 to 3 AM today with rapid heart rate and palpitations with associated chest discomfort.      Of note, she was admitted back in September 2023 with similar presentation where she was noted to be in A-fib with RVR.  During that admission, she was started on amiodarone to help with A-fib control.    On admission, EKG shows A-fib with RVR with rate related ST changes.  Troponins were checked and elevated at 44.  Subsequent troponin of 53.  Troponin trend remains relatively flat.  She was given a dose of digoxin, and oral metoprolol 25 mg x 1 to help with rate control in the emergency department.  Subsequently admitted to the medicine service and restarted on amiodarone (on amiodarone 200 mg daily).    She states that she has been taking Eliquis as prescribed 5 mg twice daily for systemic anticoagulation.    Medications / Drug list prior to admission:  No current facility-administered medications on file prior to encounter.     Current Outpatient Medications on File Prior to Encounter   Medication Sig Dispense Refill    tacrolimus (PROGRAF) 1 MG Cap Take 1 mg by mouth every morning.      ELIQUIS 5 MG Tab TAKE 1 TABLET BY MOUTH TWICE A  Tablet 1    furosemide  (LASIX) 80 MG Tab Take 80 mg by mouth every day.      amLODIPine (NORVASC) 5 MG Tab Take 1 Tablet by mouth every day. 90 Tablet 3    sodium bicarbonate (SODIUM BICARBONATE) 650 MG Tab Take 2 Tablets by mouth 2 times a day. 120 Tablet 3    mycophenolate (CELLCEPT) 250 MG Cap Take 1 Capsule by mouth 2 times a day. 20 Capsule 0    levothyroxine (SYNTHROID) 75 MCG Tab Take 1 Tablet by mouth every morning on an empty stomach. 90 Tablet 4    atorvastatin (LIPITOR) 20 MG Tab Take 1 Tablet by mouth every evening. 100 Tablet 3    insulin aspart (NOVOLOG FLEXPEN) 100 UNIT/ML injection PEN Inject 0-12 Units under the skin 4 Times a Day,Before Meals and at Bedtime. Unspecified sliding scale.      predniSONE (DELTASONE) 5 MG Tab Take 5 mg by mouth every day.         Current list of administered Medications:    Current Facility-Administered Medications:     NS (Bolus) 0.9 % infusion 500 mL, 500 mL, Intravenous, Once, Georgia Santiago R.N., Dose not Required at 11/24/23 0615    dextrose 5% infusion, , Intravenous, Continuous, Klaus Campa M.D., Last Rate: 30 mL/hr at 11/24/23 0924, New Bag at 11/24/23 0924    [COMPLETED] amiodarone (Nexterone) 360 mg/200 mL infusion, 1 mg/min, Intravenous, Once, Stopped at 11/24/23 0929 **FOLLOWED BY** amiodarone (Nexterone) 360 mg/200 mL infusion, 0.5 mg/min, Intravenous, Continuous, Klaus Campa M.D.    amLODIPine (Norvasc) tablet 5 mg, 5 mg, Oral, DAILY, Klaus Campa M.D., 5 mg at 11/24/23 0952    atorvastatin (Lipitor) tablet 20 mg, 20 mg, Oral, Nightly, Klaus Campa M.D.    apixaban (Eliquis) tablet 5 mg, 5 mg, Oral, BID, Klaus Campa M.D., 5 mg at 11/24/23 0948    levothyroxine (Synthroid) tablet 75 mcg, 75 mcg, Oral, AM ES, Klaus Campa M.D., 75 mcg at 11/24/23 0948    predniSONE (Deltasone) tablet 5 mg, 5 mg, Oral, DAILY, Klaus Campa M.D., 5 mg at 11/24/23 0948    sodium bicarbonate tablet 1,300 mg, 1,300 mg, Oral, BID, Klaus Campa M.D., 1,300 mg at 11/24/23 0948     tacrolimus (Prograf) capsule 1 mg, 1 mg, Oral, DAILY, Klaus Campa M.D., 1 mg at 11/24/23 0948    mycophenolate (Cellcept) capsule 250 mg, 250 mg, Oral, BID, Klaus Campa M.D., 250 mg at 11/24/23 0948    Respiratory Therapy Consult, , Nebulization, Continuous RT, Klaus Campa M.D.    acetaminophen (Tylenol) tablet 650 mg, 650 mg, Oral, Q6HRS PRN, Klaus Campa M.D.    ondansetron (Zofran) syringe/vial injection 4 mg, 4 mg, Intravenous, Q4HRS PRN, Klaus Campa M.D.    ondansetron (Zofran ODT) dispertab 4 mg, 4 mg, Oral, Q4HRS PRN, Klaus Campa M.D.    senna-docusate (Pericolace Or Senokot S) 8.6-50 MG per tablet 2 Tablet, 2 Tablet, Oral, BID **AND** polyethylene glycol/lytes (Miralax) PACKET 1 Packet, 1 Packet, Oral, QDAY PRN **AND** magnesium hydroxide (Milk Of Magnesia) suspension 30 mL, 30 mL, Oral, QDAY PRN **AND** bisacodyl (Dulcolax) suppository 10 mg, 10 mg, Rectal, QDAY PRN, Klaus Campa M.D.    Notify provider if pain remains uncontrolled, , , CONTINUOUS **AND** Use the Numeric Rating Scale (NRS), Bruno-Baker Faces (WBF), or FLACC on regular floors and Critical-Care Pain Observation Tool (CPOT) on ICUs/Trauma to assess pain, , , CONTINUOUS **AND** Pulse Ox, , , CONTINUOUS **AND** Pharmacy Consult Request ...Pain Management Review 1 Each, 1 Each, Other, PHARMACY TO DOSE **AND** If patient difficult to arouse and/or has respiratory depression (respiratory rate of 10 or less), stop any opiates that are currently infusing and call a Rapid Response., , , CONTINUOUS, Klaus Campa M.D.    oxyCODONE immediate-release (Roxicodone) tablet 2.5 mg, 2.5 mg, Oral, Q3HRS PRN **OR** oxyCODONE immediate-release (Roxicodone) tablet 5 mg, 5 mg, Oral, Q3HRS PRN **OR** HYDROmorphone (Dilaudid) injection 0.25 mg, 0.25 mg, Intravenous, Q3HRS PRN, Klaus Campa M.D.    labetalol (Normodyne/Trandate) injection 10 mg, 10 mg, Intravenous, Q4HRS PRN, Klaus Campa M.D.    insulin regular (HumuLIN R,NovoLIN R)  injection, 2-9 Units, Subcutaneous, 4X/DAY ACHS **AND** POC blood glucose manual result, , , Q AC AND BEDTIME(S) **AND** NOTIFY MD and PharmD, , , Once **AND** Administer 20 grams of glucose (approximately 8 ounces of fruit juice) every 15 minutes PRN FSBG less than 70 mg/dL, , , PRN **AND** dextrose 10 % BOLUS 25 g, 25 g, Intravenous, Q15 MIN PRN, Klaus Campa M.D.    amiodarone (Nexterone) 360 mg/200 mL infusion, 1 mg/min, Intravenous, Once, Klaus Campa M.D., Last Rate: 33.3 mL/hr at 23, 1 mg/min at 23    Past Medical History:   Diagnosis Date    Acquired hypothyroidism 2020    CAD (coronary artery disease)     Chronic diastolic heart failure ([facility]) 2020    Coronary artery disease due to lipid rich plaque     2 Synergy NOEMI to 100% RCA stent placed    Dental disorder     partial dentures- uppers    Diabetes ([facility])     oral medication    ESRD (end stage renal disease) on dialysis ([facility]) 2020    Hemodialysis patient ([facility])     M, W, F    Hyperlipidemia     Hypertension     Kidney transplant candidate     Kidney transplant recipient 10/31/2022    Presence of drug-eluting stent in right coronary artery     QT prolongation 2020    RLS (restless legs syndrome) 2016    Transaminitis 2018       Past Surgical History:   Procedure Laterality Date    [place]TERAL [place]PL[place]ATION  2023    [place] [facility] [place]    [place]BDOM[place]AL [place] Right 10/31/2022     Don[facility] kidney [facility] - [facility] CARDIAC CATH  2016    RCA stented with 2 Synergy drug-eluting stents.    RECOVERY  2016    Procedure: CATH LAB [facility] WITH POSSIBLE DR. CASTILLO;  Surgeon: [facility];  [place] [facility] [place]  Service:     [facility] CARDIAC CATH  2016    100% RCA    OTHER Left 2014    left arm upper extremity fistula    OTHER ABDOMINAL SURGERY      left kidney removed due to cancer       Family History  "  Problem Relation Age of Onset    Diabetes Sister     Other Sister         liver disease    Diabetes Brother     Heart Disease Neg Hx      Patient family history was personally reviewed, no pertinent family history to current presentation    Social History     Tobacco Use    Smoking status: Never    Smokeless tobacco: Never   Vaping Use    Vaping Use: Never used   Substance Use Topics    Alcohol use: No     Alcohol/week: 0.0 oz    Drug use: No       ALLERGIES:  No Known Allergies    Review of systems:  A complete review of symptoms was reviewed with the patient. This is reviewed in H&P and PMH. ALL OTHERS reviewed and negative    Physical exam:  Patient Vitals for the past 24 hrs:   BP Temp Temp src Pulse Resp SpO2 Height Weight   11/24/23 0920 126/85 36.4 °C (97.6 °F) Temporal (!) 133 20 94 % -- --   11/24/23 0835 -- -- -- (!) 110 20 96 % -- --   11/24/23 0809 (!) 153/85 -- -- (!) 150 17 97 % -- --   11/24/23 0700 133/80 -- -- (!) 129 20 97 % -- --   11/24/23 0645 123/82 -- -- (!) 129 (!) 25 97 % -- --   11/24/23 0636 129/81 -- -- (!) 136 16 97 % -- --   11/24/23 0630 129/81 -- -- (!) 132 (!) 36 96 % -- --   11/24/23 0615 115/63 -- -- (!) 128 18 95 % -- --   11/24/23 0600 115/60 -- -- (!) 129 19 94 % -- --   11/24/23 0545 100/67 -- -- (!) 130 (!) 25 91 % -- --   11/24/23 0530 93/59 -- -- (!) 118 (!) 25 92 % -- --   11/24/23 0514 137/82 -- -- (!) 142 (!) 22 97 % -- --   11/24/23 0503 -- -- -- -- -- -- 1.575 m (5' 2\") 60.6 kg (133 lb 9.6 oz)   11/24/23 0446 134/72 36.1 °C (96.9 °F) Temporal 61 18 97 % -- --     General: Not in acute distress, lying comfortably in bed  EYES: No jaundice  HEENT: OP clear   Neck:  No carotid bruits, No JVD appreciated  CVS: Tachycardic, irregularly irregular, Normal S1, S2. No murmurs, rubs or gallops  Resp: Normal respiratory effort, lungs CTA bilaterally. No rales or rhonchi  Abdomen: Soft, non-distended, non-tender to palpation  Skin: No obvious rashes, no cyanosis  Neurological: no " focal neurologic deficits  Psychiatric: Appropriate affect  Extremities:   Extremities warm, no edema, pulses intact      Data:  Laboratory studies personally reviewed by me:  Recent Results (from the past 24 hour(s))   EKG (NOW)    Collection Time: 23  4:55 AM   Result Value Ref Range    Report       [facility] Emergency Dept.    Test Date:  2023  [facility]    Department: ER  MRN:        [id number]                      Room:  Gender:     Female                       Technician: [id number]  :        1950                   Requested By:ER TRIAGE PROTOCOL  Order #:    [id number]                    Reading MD:    Measurements  Intervals                                Axis  Rate:       127                          P:          119  NE:         94                           QRS:        62  QRSD:       90                           T:          227  QT:         292  QTc:        425    Interpretive Statements  Sinus tachycardia with irregular rate  Anteroseptal infarct, old  Repol abnrm suggests ischemia, diffuse leads  Compared to ECG 2023 15:48:42  Myocardial infarct finding now present  Early repolarization now present  Sinus arrhythmia no longer present  T-wave abnormality no longer present  Possible ischemia still present     EKG    Collection Time: 23  5:24 AM   Result Value Ref Range    Report       [facility] Emergency Dept.    Test Date:  2023  [facility]    Department: ER  MRN:        [id number]                      Room:        12  Gender:     Female                       Technician: [id number]  :        1950                   Requested By:ER TRIAGE PROTOCOL  Order #:    [id number]                    Reading MD:    Measurements  Intervals                                Axis  Rate:       137                          P:          0  NE:         0                            QRS:        36  QRSD:        89                           T:          200  QT:         314  QTc:        474    Interpretive Statements  Atrial fibrillation  LVH with secondary repolarization abnormality  ST depression, probably rate related  Baseline wander in lead(s) I,III,aVR,aVL,aVF,V2,V3,V4,V5,V6  Compared to ECG 11/24/2023 04:55:27  Left ventricular hypertrophy now present  ST (T wave) deviation now present  Sinus tachycardia no longer present  Myocardi al infarct finding no longer present  Possible ischemia no longer present     CBC with Differential    Collection Time: 11/24/23  5:25 AM   Result Value Ref Range    WBC 5.8 4.8 - 10.8 K/uL    RBC 4.87 4.20 - 5.40 M/uL    Hemoglobin 13.7 12.0 - 16.0 g/dL    Hematocrit 42.9 37.0 - 47.0 %    MCV 88.1 81.4 - 97.8 fL    MCH 28.1 27.0 - 33.0 pg    MCHC 31.9 (L) 32.2 - 35.5 g/dL    RDW 47.7 35.9 - 50.0 fL    Platelet Count 105 (L) 164 - 446 K/uL    MPV 10.6 9.0 - 12.9 fL    Neutrophils-Polys 76.70 (H) 44.00 - 72.00 %    Lymphocytes 13.00 (L) 22.00 - 41.00 %    Monocytes 8.80 0.00 - 13.40 %    Eosinophils 0.70 0.00 - 6.90 %    Basophils 0.30 0.00 - 1.80 %    Immature Granulocytes 0.50 0.00 - 0.90 %    Nucleated RBC 0.00 0.00 - 0.20 /100 WBC    Neutrophils (Absolute) 4.43 1.82 - 7.42 K/uL    Lymphs (Absolute) 0.75 (L) 1.00 - 4.80 K/uL    Monos (Absolute) 0.51 0.00 - 0.85 K/uL    Eos (Absolute) 0.04 0.00 - 0.51 K/uL    Baso (Absolute) 0.02 0.00 - 0.12 K/uL    Immature Granulocytes (abs) 0.03 0.00 - 0.11 K/uL    NRBC (Absolute) 0.00 K/uL   Complete Metabolic Panel (CMP)    Collection Time: 11/24/23  5:25 AM   Result Value Ref Range    Sodium 139 135 - 145 mmol/L    Potassium 3.9 3.6 - 5.5 mmol/L    Chloride 105 96 - 112 mmol/L    Co2 20 20 - 33 mmol/L    Anion Gap 14.0 7.0 - 16.0    Glucose 120 (H) 65 - 99 mg/dL    Bun 29 (H) 8 - 22 mg/dL    Creatinine 1.23 0.50 - 1.40 mg/dL    Calcium 10.1 8.5 - 10.5 mg/dL    Correct Calcium 9.8 8.5 - 10.5 mg/dL    AST(SGOT) 25 12 - 45 U/L    ALT(SGPT) 26 2 -  50 U/L    Alkaline Phosphatase 77 30 - 99 U/L    Total Bilirubin 0.4 0.1 - 1.5 mg/dL    Albumin 4.4 3.2 - 4.9 g/dL    Total Protein 7.0 6.0 - 8.2 g/dL    Globulin 2.6 1.9 - 3.5 g/dL    A-G Ratio 1.7 g/dL   Troponins NOW    Collection Time: 11/24/23  5:25 AM   Result Value Ref Range    Troponin T 44 (H) 6 - 19 ng/L   ESTIMATED GFR    Collection Time: 11/24/23  5:25 AM   Result Value Ref Range    GFR (CKD-EPI) 46 (A) >60 mL/min/1.73 m 2   TSH    Collection Time: 11/24/23  5:25 AM   Result Value Ref Range    TSH 2.740 0.380 - 5.330 uIU/mL   Troponins in two (2) hours    Collection Time: 11/24/23  6:55 AM   Result Value Ref Range    Troponin T 53 (H) 6 - 19 ng/L   MAGNESIUM    Collection Time: 11/24/23  6:55 AM   Result Value Ref Range    Magnesium 1.7 1.5 - 2.5 mg/dL   POCT glucose device results    Collection Time: 11/24/23  8:34 AM   Result Value Ref Range    POC Glucose, Blood 128 (H) 65 - 99 mg/dL   TROPONIN    Collection Time: 11/24/23  8:49 AM   Result Value Ref Range    Troponin T 61 (H) 6 - 19 ng/L       Imaging:  DX-CHEST-PORTABLE (1 VIEW)   Final Result         No acute cardiac or pulmonary abnormality is identified.            EKG tracings personally reviewed by me shows atrial fibrillation with RVR    Echo 9/6/2023:  Normal left ventricular size, thickness, and systolic function.  The left ventricular ejection fraction is visually estimated to be 65-70%.  Normal right ventricular size and systolic function.  Estimated right ventricular systolic pressure is 40 mmHg.  Mild tricuspid regurgitation.  Normal inferior vena cava size and inspiratory collapse.    Stress 12/2022:  NUCLEAR IMAGING INTERPRETATION   Normal Lexiscan myocardial perfusion study.   No evidence of ischemia or infarct.   SDS 0.   LVEF 70%.   No ischemic changes with Regadenoson.   No arrhythmias.   No chest pain.   ECG INTERPRETATION   Negative stress ECG for ischemia.    Cardiac Cath 2021:  Left Main: Large caliber bifurcating no CAD.  Left  Anterior Descending: Moderate to large caliber vessel with usual complement of diagonals.  Mild nonobstructive CAD.  Left Circumflex: Large caliber Co-dominant supplying multiple large tortuous obtuse marginals with mild nonobstructive CAD.  Right Coronary: Small caliber supplying a moderate-sized RPDA.  This is a codominant fashion.  There is a widely patent long segment of previously placed stents in the midportion.    All pertinent features of laboratory and imaging reviewed including primary images where applicable      Principal Problem:    Atrial fibrillation with rapid ventricular response (HCC) (POA: Yes)  Active Problems:    Elevated troponin (POA: Yes)    Mixed hyperlipidemia (POA: Yes)    Chronic heart failure with preserved ejection fraction (HCC) (POA: Yes)    Acquired hypothyroidism (POA: Yes)    Type 2 diabetes mellitus (HCC) (POA: Yes)    Stage 3b chronic kidney disease (HCC) (POA: Yes)    Acquired circulating anticoagulants (HCC) (POA: Yes)  Resolved Problems:    * No resolved hospital problems. *      Assessment / Plan:  This is a 72-year-old woman with past medical history significant for heart failure with preserved ejection fraction, CAD, CKD status post renal transplant in October 2022, history of persistent atrial fibrillation on Eliquis admitted with atrial fibrillation with rapid ventricular response.    1.  Atrial fibrillation with rapid ventricular response  2.  Persistent AF, on Eliquis  3.  Mild nonobstructive CAD  4.  CKD status post renal transplant  5.  Mildly elevated troponin    Recommendations:  The patient presents to the hospital with sudden onset palpitations with associated chest discomfort.  Presentation similar to  admission back in September 2023.  Review of her EKG shows A-fib with RVR with rate related ST changes.  Her troponin is minimally elevated and have remained flat on repeat.  Troponin elevation does not represent an acute coronary syndrome.  Suspect this is likely  related to demand in the setting of tachycardia/AF  Restart home dose of amiodarone  Can stop amlodipine for blood pressure.  In lieu of this we will initiate therapy with metoprolol tartrate 25 mg twice daily to help with rate control.  Continue Eliquis for systemic anticoagulation. Has been compliant with OAC  Cardiology will continue to follow      Future Appointments   Date Time Provider Department Center   11/27/2023  6:30 AM MAIN LAB [facility] None   12/8/2023  4:00 PM ANGELITA Concepcion   12/15/2023  3:30 PM Priyank Villar M.D. [address]   12/29/2023  4:15 PM Milton Pettit M.D. [facility] None   3/20/2024  3:00 [facility]       It is my pleasure to participate in the care of Ms. Kenyetta Gallegos.  Please do not hesitate to contact me with questions or concerns.    Arthur Perry MD  Cardiologist, [facility]    11/24/2023    Please note that this dictation was created using voice recognition software. I have made every reasonable attempt to correct obvious errors, but it is possible there are errors of grammar and possibly content that I did not discover before finalizing the note.

## 2023-11-24 NOTE — ED PROVIDER NOTES
ED Provider Note    CHIEF COMPLAINT  Chief Complaint   Patient presents with    Chest Pain     Left sided chest pain radiating to the left arm and jaw. Started around 2 am.       EXTERNAL RECORDS REVIEWED  Hospitalization 9/4/2023, echocardiogram with normal ejection fraction, hospitalization for atrial fibrillation with RVR with associated chest pain.    HPI/ROS  LIMITATION TO HISTORY   Select: Language American,  Used       Tere Kenyetta Gallegos is a 72 y.o. female who presents with chief complaint of chest pain.  Patient with history of afib on Eliquis CAD, and chronic diastolic heart failure.  Patient also with history of ESBL cystitis, ESRD status post kidney transplant on immunosuppressive's, and diabetes.  Patient reports that around 2 AM this morning she woke up with rapid palpitations, and chest pain radiating to her left arm.  Patient reports chest pain is identical to prior episodes of atrial fibrillation with RVR.  She reports associated nausea without vomiting. Patient denies any tearing quality of pain rating into her back.  She denies any associated tearing quality pain.  Patient denies any associated lower extremity edema or recent calf pain.  She reports she is compliant with her Eliquis, and does not miss any doses.  She has been on this for years.  Patient denies any fevers or chills.  Patient recently admitted 9/4/2023 for chest pain in the setting of A-fib with RVR.  Patient was started on amiodarone at that point.  Echocardiogram was unremarkable at that time.    PAST MEDICAL HISTORY   has a past medical history of Acquired hypothyroidism (05/04/2020), CAD (coronary artery disease), Chronic diastolic heart failure (HCC) (05/04/2020), Coronary artery disease due to lipid rich plaque, Dental disorder, Diabetes ([facility]), ESRD (end stage renal disease) on dialysis ([facility]) (05/04/2020), Hemodialysis patient ([facility]), Hyperlipidemia, Hypertension, Kidney transplant candidate, Kidney transplant  "recipient (10/31/2022), Presence of drug-eluting stent in right coronary artery, QT prolongation (01/22/2020), RLS (restless legs syndrome) (08/05/2016), and Transaminitis (12/22/2018).    SURGICAL HISTORY   has a past surgical history that includes recovery (08/16/2016); other abdominal surgery; other (Left, 2014); zzz cardiac cath (08/16/2016); zzz cardiac cath (09/07/2016); other abdominal surgery (Right, 10/31/2022); and ureteral reimplantation (08/07/2023).    FAMILY HISTORY  Family History   Problem Relation Age of Onset    Diabetes Sister     Other Sister         liver disease    Diabetes Brother     Heart Disease Neg Hx        SOCIAL HISTORY  Social History     Tobacco Use    Smoking status: Never    Smokeless tobacco: Never   Vaping Use    Vaping Use: Never used   Substance and Sexual Activity    Alcohol use: No     Alcohol/week: 0.0 oz    Drug use: No    Sexual activity: Never       CURRENT MEDICATIONS  Home Medications       Reviewed by Yao Carter R.N. (Registered Nurse) on 11/24/23 at 0506  Med List Status: Partial     Medication Last Dose Status   amLODIPine (NORVASC) 5 MG Tab  Active   atorvastatin (LIPITOR) 20 MG Tab  Active   ELIQUIS 5 MG Tab  Active   furosemide (LASIX) 80 MG Tab  Active   insulin aspart (NOVOLOG FLEXPEN) 100 UNIT/ML injection PEN  Active   levothyroxine (SYNTHROID) 75 MCG Tab  Active   losartan (COZAAR) 100 MG Tab  Active   mycophenolate (CELLCEPT) 250 MG Cap  Active   predniSONE (DELTASONE) 5 MG Tab  Active   sodium bicarbonate (SODIUM BICARBONATE) 650 MG Tab  Active   tacrolimus (PROGRAF) 1 MG Cap  Active                    ALLERGIES  No Known Allergies    PHYSICAL EXAM  VITAL SIGNS: /72   Pulse 61 Comment: Irregular rate  Temp 36.1 °C (96.9 °F) (Temporal)   Resp 18   Ht 1.575 m (5' 2\")   Wt 60.6 kg (133 lb 9.6 oz)   LMP  (LMP Unknown)   SpO2 97%   BMI 24.44 kg/m²    Physical Exam  Constitutional:       Appearance: She is well-developed.   HENT:      Head: " Normocephalic and atraumatic.   Eyes:      Pupils: Pupils are equal, round, and reactive to light.   Cardiovascular:      Rate and Rhythm: Tachycardia present. Rhythm irregular.   Pulmonary:      Effort: Pulmonary effort is normal. No accessory muscle usage or respiratory distress.      Breath sounds: Normal breath sounds.   Abdominal:      General: Bowel sounds are normal.      Palpations: Abdomen is soft. There is no mass.      Tenderness: There is no abdominal tenderness.   Musculoskeletal:         General: Normal range of motion.      Right lower leg: No edema.      Left lower leg: No edema.      Comments: Pulses equal in all 4 extremities   Skin:     General: Skin is warm.      Capillary Refill: Capillary refill takes less than 2 seconds.   Neurological:      General: No focal deficit present.      Mental Status: She is alert.   Psychiatric:         Mood and Affect: Mood normal. Mood is not anxious.           DIAGNOSTIC STUDIES / PROCEDURES  EKG  I have independently interpreted this EKG  Atrial fibrillation with RVR, ST depressions diffusely, most pronounced in lateral leads    LABS  Results for orders placed or performed during the hospital encounter of 11/24/23   CBC with Differential   Result Value Ref Range    WBC 5.8 4.8 - 10.8 K/uL    RBC 4.87 4.20 - 5.40 M/uL    Hemoglobin 13.7 12.0 - 16.0 g/dL    Hematocrit 42.9 37.0 - 47.0 %    MCV 88.1 81.4 - 97.8 fL    MCH 28.1 27.0 - 33.0 pg    MCHC 31.9 (L) 32.2 - 35.5 g/dL    RDW 47.7 35.9 - 50.0 fL    Platelet Count 105 (L) 164 - 446 K/uL    MPV 10.6 9.0 - 12.9 fL    Neutrophils-Polys 76.70 (H) 44.00 - 72.00 %    Lymphocytes 13.00 (L) 22.00 - 41.00 %    Monocytes 8.80 0.00 - 13.40 %    Eosinophils 0.70 0.00 - 6.90 %    Basophils 0.30 0.00 - 1.80 %    Immature Granulocytes 0.50 0.00 - 0.90 %    Nucleated RBC 0.00 0.00 - 0.20 /100 WBC    Neutrophils (Absolute) 4.43 1.82 - 7.42 K/uL    Lymphs (Absolute) 0.75 (L) 1.00 - 4.80 K/uL    Monos (Absolute) 0.51 0.00 - 0.85  K/uL    Eos (Absolute) 0.04 0.00 - 0.51 K/uL    Baso (Absolute) 0.02 0.00 - 0.12 K/uL    Immature Granulocytes (abs) 0.03 0.00 - 0.11 K/uL    NRBC (Absolute) 0.00 K/uL   Complete Metabolic Panel (CMP)   Result Value Ref Range    Sodium 139 135 - 145 mmol/L    Potassium 3.9 3.6 - 5.5 mmol/L    Chloride 105 96 - 112 mmol/L    Co2 20 20 - 33 mmol/L    Anion Gap 14.0 7.0 - 16.0    Glucose 120 (H) 65 - 99 mg/dL    Bun 29 (H) 8 - 22 mg/dL    Creatinine 1.23 0.50 - 1.40 mg/dL    Calcium 10.1 8.5 - 10.5 mg/dL    Correct Calcium 9.8 8.5 - 10.5 mg/dL    AST(SGOT) 25 12 - 45 U/L    ALT(SGPT) 26 2 - 50 U/L    Alkaline Phosphatase 77 30 - 99 U/L    Total Bilirubin 0.4 0.1 - 1.5 mg/dL    Albumin 4.4 3.2 - 4.9 g/dL    Total Protein 7.0 6.0 - 8.2 g/dL    Globulin 2.6 1.9 - 3.5 g/dL    A-G Ratio 1.7 g/dL   Troponins NOW   Result Value Ref Range    Troponin T 44 (H) 6 - 19 ng/L   Troponins in two (2) hours   Result Value Ref Range    Troponin T 53 (H) 6 - 19 ng/L   ESTIMATED GFR   Result Value Ref Range    GFR (CKD-EPI) 46 (A) >60 mL/min/1.73 m 2   MAGNESIUM   Result Value Ref Range    Magnesium 1.7 1.5 - 2.5 mg/dL   TACROLIMUS BLOOD   Result Value Ref Range    Tacrolimius 2.1 (L) 5.0 - 20.0 ng/mL   TROPONIN   Result Value Ref Range    Troponin T 61 (H) 6 - 19 ng/L   TSH   Result Value Ref Range    TSH 2.740 0.380 - 5.330 uIU/mL   EKG (NOW)   Result Value Ref Range    Report       [facility] Emergency Dept.    Test Date:  2023  Pt Name:    DIOR STAPLESROMEL    Department: ER  MRN:        [id number]                      Room:  Gender:     Female                       Technician: [id number]  :        1950                   Requested By:ER TRIAGE PROTOCOL  Order #:    [id number]                    Reading MD:    Measurements  Intervals                                Axis  Rate:       127                          P:          119  VA:         94                           QRS:        62  QRSD:       90                            T:          227  QT:         292  QTc:        425    Interpretive Statements  Sinus tachycardia with irregular rate  Anteroseptal infarct, old  Repol abnrm suggests ischemia, diffuse leads  Compared to ECG 2023 15:48:42  Myocardial infarct finding now present  Early repolarization now present  Sinus arrhythmia no longer present  T-wave abnormality no longer present  Possible ischemia still present     EKG   Result Value Ref Range    Report       [facility] Emergency Dept.    Test Date:  2023  Pt Name:    DIOR NICHOLS    Department: ER  MRN:        [id number]                      Room:       RD 12  Gender:     Female                       Technician: [id number]  :        1950                   Requested By:ER TRIAGE PROTOCOL  Order #:    [id number]                    Reading MD:    Measurements  Intervals                                Axis  Rate:       137                          P:          0  WV:         0                            QRS:        36  QRSD:       89                           T:          200  QT:         314  QTc:        474    Interpretive Statements  Atrial fibrillation  LVH with secondary repolarization abnormality  ST depression, probably rate related  Baseline wander in lead(s) I,III,aVR,aVL,aVF,V2,V3,V4,V5,V6  Compared to ECG 2023 04:55:27  Left ventricular hypertrophy now present  ST (T wave) deviation now present  Sinus tachycardia no longer present  Myocardi al infarct finding no longer present  Possible ischemia no longer present     EKG (NOW)   Result Value Ref Range    Report       [facility]    Test Date:  2023  Pt Name:    DIOR NICHOLS    Department: ER  MRN:        [id number]                      Room:       T723  Gender:     Female                       Technician: KATHLEEN  :        1950                   Requested By:ERICK MURRELL  Order #:    [id number]                    Reading  MD:    Measurements  Intervals                                Axis  Rate:       52                           P:          -66  TX:         130                          QRS:        98  QRSD:       85                           T:          245  QT:         449  QTc:        418    Interpretive Statements  Sinus or ectopic atrial bradycardia  Right axis deviation  Consider left ventricular hypertrophy  Repol abnrm, prob ischemia, anterolateral lds  Compared to ECG 2023 05:24:49  Bradycardia, nonsinus now present  Right-axis deviation now present  Possible ischemia now present  Atrial fibrillation no longer present  ST (T wave) deviation no longer presen t     EKG   Result Value Ref Range    Report       [facility]    Test Date:  2023  Pt Name:    DIOR NICHOLS    Department: 171  MRN:        [id number]                      Room:       [facility]  Gender:     Female                       Technician: KATHLEEN  :        1950                   Requested By:JOSELIN SUGGS  Order #:    [id number]                    Reading MD:    Measurements  Intervals                                Axis  Rate:       52                           P:          246  TX:         133                          QRS:        98  QRSD:       83                           T:          229  QT:         455  QTc:        424    Interpretive Statements  Sinus or ectopic atrial bradycardia  Right axis deviation  Consider left ventricular hypertrophy  Repol abnrm, prob ischemia, anterolateral lds  Baseline wander in lead(s) V1  Compared to ECG 2023 05:24:49  Bradycardia, nonsinus now present  Right-axis deviation now present  Possible ischemia now present  Atrial fibrillation no longer present  ST (T w ave) deviation no longer present     POCT glucose device results   Result Value Ref Range    POC Glucose, Blood 128 (H) 65 - 99 mg/dL         RADIOLOGY  I have independently interpreted the diagnostic imaging associated with this visit  and am waiting the final reading from the radiologist.   My preliminary interpretation is as follows: Unremarkable chest x-ray  Radiologist interpretation:   DX-CHEST-PORTABLE (1 VIEW)   Final Result         No acute cardiac or pulmonary abnormality is identified.            COURSE & MEDICAL DECISION MAKING        INITIAL ASSESSMENT, COURSE AND PLAN  Care Narrative: Well-appearing patient here with atrial fibrillation with RVR.  Pressure is normal.  Patient with favorable ejection fraction.  She has pain likely related to her elevated rate.  Will rate control at this point, and if symptoms persist will consider cardioversion.  Patient does have some diffuse T wave changes most consistent with demand ischemia.  Patient without any improvement in rate despite the bolus of diltiazem.  She does have some associated hypotension with this however.  I therefore discussed the case with cardiology, Dr. Tay requested starting the patient on digoxin to see how she did with this.  Patient was given bolus of digoxin, this also does not affect her rate.  Cardiologist on-call is seen patient, they will start patient on amiodarone when she is admitted, they agree with admission.  Patient remains mildly tachycardic but her pain has almost entirely resolved.  If symptoms continue to persist despite patient's improvement in her A-fib with RVR consider alternative diagnoses.  Discussed the case with hospitalist who has agreed to admit.        DISPOSITION AND DISCUSSIONS  I have discussed management of the patient with the following physicians and BETTE's: Cardiology, hospitalist        FINAL DIAGNOSIS  1. Acute chest pain    2. Paroxysmal atrial fibrillation with rapid ventricular response (HCC)

## 2023-11-24 NOTE — ED NOTES
Ipad  used for the following: Pt medicated per MAR. Blood specimen obtained and sent to lab.Pt reports no other needs at this time. Call light within reach.

## 2023-11-24 NOTE — ASSESSMENT & PLAN NOTE
- Does not appear to be volume overloaded.  -Control atrial fibrillation as above.  -Optimize blood pressure control.  Resume home antihypertensives.  -Monitor on telemetry.

## 2023-11-24 NOTE — PROGRESS NOTES
4 Eyes Skin Assessment Completed by TRAVIS Cortes and TRAVIS Myrick.    Head WDL  Ears WDL  Nose WDL  Mouth WDL  Neck WDL  Breast/Chest WDL  Shoulder Blades WDL  Spine WDL  (R) Arm/Elbow/Hand WDL  (L) Arm/Elbow/Hand Bruising  Abdomen WDL  Groin WDL  Scrotum/Coccyx/Buttocks WDL  (R) Leg WDL  (L) Leg WDL  (R) Heel/Foot/Toe WDL  (L) Heel/Foot/Toe WDL      -Fistula to left upper arm. Skin is intact, no areas of concern.    Devices In Places Tele Box, Blood Pressure Cuff, and Pulse Ox      Interventions In Place Pressure Redistribution Mattress    Possible Skin Injury No    Pictures Uploaded Into Epic N/A  Wound Consult Placed N/A  RN Wound Prevention Protocol Ordered No

## 2023-11-24 NOTE — PROGRESS NOTES
Post-amiodarone bolus, notified by monitor tech that patient had converted to sinus kelby 47. Patient is stable, Dr. Campa notified, okay to continue, continuous amiodarone infusion. MD to be notified if patient sustaining below 40 or if symptomatic.

## 2023-11-24 NOTE — ASSESSMENT & PLAN NOTE
- s/p  donor renal transplant in 2022.  Creatinine appears to be still within her baseline.  -Resume immunosuppressants with tacrolimus and mycophenolate, along with prednisone.  Check tacrolimus level.  -Monitor renal function closely.  Monitor electrolytes as well.  - avoid nephrotoxins, and continue to renally dose all medications.

## 2023-11-24 NOTE — ED TRIAGE NOTES
Terelatricia Kumari El  72 y.o. female  Chief Complaint   Patient presents with    Chest Pain     Left sided chest pain radiating to the left arm and jaw. Started around 2 am.     Pt on wheelchair to triage for above complaint accompanied by son.    Pt is GCS 15, speaking in full sentences, follows commands and responds appropriately to questions. Resp are even and unlabored.         Vitals:    11/24/23 0446   BP: 134/72   Pulse: 61   Resp: 18   Temp: 36.1 °C (96.9 °F)   SpO2: 97%

## 2023-11-24 NOTE — ASSESSMENT & PLAN NOTE
- Continues to have RVR, did not respond much to digoxin, and Cardizem because blood pressure to drop.  -Admit to telemetry.  -Start amiodarone IV per cardiology.  Resume home metoprolol, along with anticoagulation with Eliquis.  Await further cardiology recommendations.  -Monitor on telemetry.

## 2023-11-24 NOTE — PROGRESS NOTES
Patient up to unit from ED. Patient is A&Ox4, Albanian speaking only, VSS, no signs of distress. Tele monitor placed and verified by monitor room. All questions and concerns answered, call light in reach, plan of care ongoing.

## 2023-11-24 NOTE — CARE PLAN
The patient is Stable - Low risk of patient condition declining or worsening    Shift Goals  Clinical Goals: Control rate and rhythm      Problem: Pain - Standard  Goal: Alleviation of pain or a reduction in pain to the patient’s comfort goal  Outcome: Progressing     Problem: Knowledge Deficit - Standard  Goal: Patient and family/care givers will demonstrate understanding of plan of care, disease process/condition, diagnostic tests and medications  Outcome: Progressing

## 2023-11-24 NOTE — ED NOTES
Med Rec complete per patient and RX bottles at bedside (returned)   Allergies reviewed  Antibiotics in the past 30 days:NO  Anticoagulant in past 14 days:YES  Anticoagulant:ELIQUIS 5 MG Last dose:11/24/23  Pharmacy patient utilizes:[facility] on [address]    Pt had several duplicate RX bottles of Tacrolimus with different directions. Verified with patient she is taking Tacrolimus 1 mg QD.

## 2023-11-24 NOTE — H&P
Hospital Medicine History & Physical Note    Date of Service  2023    Primary Care Physician  MALGORZATA Concepcion.    Consultants  cardiology    Specialist Names: Dr. Perry    Code Status  Full Code    Chief Complaint  Chief Complaint   Patient presents with    Chest Pain     Left sided chest pain radiating to the left arm and jaw. Started around 2 am.       History of Presenting Illness  Tere Gallegos is a 72 y.o. female with hypothyroidism, chronic HFpEF, CAD, CKD status post  donor renal transplant in 2022, and history of atrial fibrillation on anticoagulation who presented 2023 with acute onset chest pain which woke her up from sleep at around 2 AM.  Pain was located in the middle of her chest, and radiated to the left arm.  She also had palpitations and headaches.  She denied any shortness of breath.  She had no other complaints such as nausea, vomiting, and abdominal pain.  Otherwise she was doing well prior to the onset of her symptoms.    ED course:  On evaluation, she was noted to be in A-fib with RVR, with heart rate in the 120s to 140s.  She was requiring 2 L of oxygen by nasal cannula.  Initial blood workup showed normal WBC count, normal electrolytes, and creatinine of 1.23.  LFTs were normal.  Troponin was 44 and 53.  Chest x-ray (personally reviewed) did not show infiltrates or consolidation.  EKG (my read) showed atrial fibrillation with RVR.  Patient was initially given IV Cardizem which decreased blood pressure.  She was also given digoxin which did not help with the A-fib.  Cardiology was consulted who recommended starting her on amiodarone.  She was subsequently admitted to the hospitalist service.    I personally reviewed all lab results mentioned above. Prior medical records from this institution and outside facilities were independently reviewed as noted. I also personally reviewed all ER physician and consultant recommendations and plans as  documented above. History was independently obtained by myself. I discussed the plan of care with patient, bedside RN, charge RN, , and ERP .    Review of Systems  ROS    Pertinent positives/negatives as mentioned above.     A complete review of systems was personally done by me. All other systems were negative.       Past Medical History   has a past medical history of Acquired hypothyroidism (05/04/2020), CAD (coronary artery disease), Chronic diastolic heart failure (HCC) (05/04/2020), Coronary artery disease due to lipid rich plaque, Dental disorder, Diabetes (HCC), ESRD (end stage renal disease) on dialysis (HCC) (05/04/2020), Hemodialysis patient ([facility]), Hyperlipidemia, Hypertension, Kidney transplant candidate, Kidney transplant recipient (10/31/2022), Presence of drug-eluting stent in right coronary artery, QT prolongation (01/22/2020), RLS (restless legs syndrome) (08/05/2016), and Transaminitis (12/22/2018).    Surgical History   has a past surgical history that includes recovery (08/16/2016); other abdominal surgery; other (Left, 2014); zzz cardiac cath (08/16/2016); zzz cardiac cath (09/07/2016); other abdominal surgery (Right, 10/31/2022); and ureteral reimplantation (08/07/2023).     Family History  family history includes Diabetes in her brother and sister; Other in her sister.     Social History   reports that she has never smoked. She has never used smokeless tobacco. She reports that she does not drink alcohol and does not use drugs.    Allergies  No Known Allergies    Medications  Prior to Admission Medications   Prescriptions Last Dose Informant Patient Reported? Taking?   ELIQUIS 5 MG Tab   No No   Sig: TAKE 1 TABLET BY MOUTH TWICE A DAY   amLODIPine (NORVASC) 5 MG Tab  Patient, Rx Bottle (For Med Information), Patient's Home Pharmacy No No   Sig: Take 1 Tablet by mouth every day.   atorvastatin (LIPITOR) 20 MG Tab  Patient, Rx Bottle (For Med Information) No No   Sig: Take 1 Tablet  by mouth every evening.   furosemide (LASIX) 80 MG Tab   Yes No   Sig: Take 80 mg by mouth every day.   insulin aspart (NOVOLOG FLEXPEN) 100 UNIT/ML injection PEN  Patient's Home Pharmacy Yes No   Sig: Inject 0-12 Units under the skin 3 times a day before meals. Unspecified sliding scale.   levothyroxine (SYNTHROID) 75 MCG Tab  Patient, Rx Bottle (For Med Information) No No   Sig: Take 1 Tablet by mouth every morning on an empty stomach.   losartan (COZAAR) 100 MG Tab   Yes No   Sig: Take 100 mg by mouth every day.   mycophenolate (CELLCEPT) 250 MG Cap  Patient, Rx Bottle (For Med Information), Patient's Home Pharmacy No No   Sig: Take 1 Capsule by mouth 2 times a day.   predniSONE (DELTASONE) 5 MG Tab  Patient Yes No   Sig: Take 5 mg by mouth every day.   sodium bicarbonate (SODIUM BICARBONATE) 650 MG Tab  Patient, Rx Bottle (For Med Information), Patient's Home Pharmacy No No   Sig: Take 2 Tablets by mouth 2 times a day.   tacrolimus (PROGRAF) 1 MG Cap   No No   Sig: Take 2 Capsules by mouth 2 times a day.      Facility-Administered Medications: None       Physical Exam  Temp:  [36.1 °C (96.9 °F)] 36.1 °C (96.9 °F)  Pulse:  [] 150  Resp:  [16-36] 17  BP: ()/(59-85) 153/85  SpO2:  [91 %-97 %] 97 %  Blood Pressure : (!) 153/85   Temperature: 36.1 °C (96.9 °F)   Pulse: (!) 150   Respiration: 17   Pulse Oximetry: 97 %       Physical Exam  Vitals reviewed.   Constitutional:       General: She is not in acute distress.     Appearance: Normal appearance. She is normal weight. She is not ill-appearing or diaphoretic.   HENT:      Head: Normocephalic and atraumatic.      Left Ear: External ear normal.      Mouth/Throat:      Mouth: Mucous membranes are moist.      Pharynx: No oropharyngeal exudate or posterior oropharyngeal erythema.   Eyes:      General: No scleral icterus.     Extraocular Movements: Extraocular movements intact.      Conjunctiva/sclera: Conjunctivae normal.      Pupils: Pupils are equal,  "round, and reactive to light.   Cardiovascular:      Rate and Rhythm: Tachycardia present. Rhythm irregular.      Heart sounds: Normal heart sounds. No murmur heard.  Pulmonary:      Effort: Pulmonary effort is normal. No respiratory distress.      Breath sounds: Normal breath sounds. No stridor. No wheezing, rhonchi or rales.   Chest:      Chest wall: No tenderness.   Abdominal:      General: Bowel sounds are normal. There is no distension.      Palpations: Abdomen is soft. There is no mass.      Tenderness: There is no abdominal tenderness. There is no guarding or rebound.   Musculoskeletal:         General: No swelling. Normal range of motion.      Cervical back: Normal range of motion and neck supple. No rigidity. No muscular tenderness.      Right lower leg: No edema.      Left lower leg: No edema.   Lymphadenopathy:      Cervical: No cervical adenopathy.   Skin:     General: Skin is warm and dry.      Coloration: Skin is not jaundiced.      Findings: No rash.   Neurological:      General: No focal deficit present.      Mental Status: She is alert and oriented to person, place, and time. Mental status is at baseline.      Cranial Nerves: No cranial nerve deficit.   Psychiatric:         Mood and Affect: Mood normal.         Behavior: Behavior normal.         Thought Content: Thought content normal.         Judgment: Judgment normal.         Laboratory:  Recent Labs     11/24/23  0525   WBC 5.8   RBC 4.87   HEMOGLOBIN 13.7   HEMATOCRIT 42.9   MCV 88.1   MCH 28.1   MCHC 31.9*   RDW 47.7   PLATELETCT 105*   MPV 10.6     Recent Labs     11/24/23  0525   SODIUM 139   POTASSIUM 3.9   CHLORIDE 105   CO2 20   GLUCOSE 120*   BUN 29*   CREATININE 1.23   CALCIUM 10.1     Recent Labs     11/24/23  0525   ALTSGPT 26   ASTSGOT 25   ALKPHOSPHAT 77   TBILIRUBIN 0.4   GLUCOSE 120*         No results for input(s): \"NTPROBNP\" in the last 72 hours.      Recent Labs     11/24/23  0525 11/24/23  0655   TROPONINT 44* 53* "       Imaging:  DX-CHEST-PORTABLE (1 VIEW)   Final Result         No acute cardiac or pulmonary abnormality is identified.            Imaging studies and EKG results were independently reviewed as above.      Assessment/Plan:  Justification for Admission Status  I anticipate this patient will require at least two midnights for appropriate medical management, necessitating inpatient admission because of A-fib with RVR requiring IV amiodarone, and close telemetry monitoring to ensure that heart rate is better controlled.    Patient will need a Telemetry bed on MEDICAL service .  The need is secondary to atrial fibrillation.    * Atrial fibrillation with rapid ventricular response (HCC)- (present on admission)  Assessment & Plan  - Continues to have RVR, did not respond much to digoxin, and Cardizem because blood pressure to drop.  -Admit to telemetry.  -Start amiodarone IV per cardiology.  Resume home metoprolol, along with anticoagulation with Eliquis.  Await further cardiology recommendations.  -Monitor on telemetry.    Elevated troponin- (present on admission)  Assessment & Plan  - Likely demand ischemia from atrial fibrillation, in setting of CKD.  -Trend troponins.    Acquired circulating anticoagulants (HCC)- (present on admission)  Assessment & Plan  -Resume Eliquis.    Stage 3b chronic kidney disease (HCC)- (present on admission)  Assessment & Plan  - s/p  donor renal transplant in 2022.  Creatinine appears to be still within her baseline.  -Resume immunosuppressants with tacrolimus and mycophenolate, along with prednisone.  Check tacrolimus level.  -Monitor renal function closely.  Monitor electrolytes as well.  - avoid nephrotoxins, and continue to renally dose all medications.    Type 2 diabetes mellitus (HCC)- (present on admission)  Assessment & Plan  - I will put her on sliding scale insulin coverage while in the hospital. Accu-Cheks before meals and at bedtime. Goal to keep BG between  140-180 per 2019 ADA guidelines.  Continue diabetic diet.    Acquired hypothyroidism- (present on admission)  Assessment & Plan  - Resume home Synthroid.  Check TSH.    Chronic heart failure with preserved ejection fraction (HCC)- (present on admission)  Assessment & Plan  - Does not appear to be volume overloaded.  -Control atrial fibrillation as above.  -Optimize blood pressure control.  Resume home antihypertensives.  -Monitor on telemetry.    Mixed hyperlipidemia- (present on admission)  Assessment & Plan  - Resume Lipitor.        VTE prophylaxis: therapeutic anticoagulation with Eliquis

## 2023-11-24 NOTE — ASSESSMENT & PLAN NOTE
- I will put her on sliding scale insulin coverage while in the hospital. Accu-Cheks before meals and at bedtime. Goal to keep BG between 140-180 per 2019 ADA guidelines.  Continue diabetic diet.

## 2023-11-24 NOTE — ED NOTES
Given breakfast tray.   [facility] Hospital Medicine  Progress Note    Patient Name: Evelyn Crawford  MRN: [id number]  Patient Class: IP- Inpatient   Admission Date: 6/21/2019  Length of Stay: 11 days  Attending Physician: January Cabrera MD  Primary Care Provider: Eliu Torrez MD    [facility] Hospital Medicine Team: [facility] HOSP MED 2 Kong Garcia [facility]

## 2023-11-25 ENCOUNTER — PHARMACY VISIT (OUTPATIENT)
Dept: PHARMACY | Facility: MEDICAL CENTER | Age: 73
End: 2023-11-25
Payer: COMMERCIAL

## 2023-11-25 VITALS
HEIGHT: 62 IN | BODY MASS INDEX: 24.34 KG/M2 | TEMPERATURE: 97.2 F | HEART RATE: 63 BPM | WEIGHT: 132.28 LBS | RESPIRATION RATE: 19 BRPM | SYSTOLIC BLOOD PRESSURE: 171 MMHG | OXYGEN SATURATION: 93 % | DIASTOLIC BLOOD PRESSURE: 61 MMHG

## 2023-11-25 LAB
ANION GAP SERPL CALC-SCNC: 12 MMOL/L (ref 7–16)
BASOPHILS # BLD AUTO: 0.5 % (ref 0–1.8)
BASOPHILS # BLD: 0.02 K/UL (ref 0–0.12)
BUN SERPL-MCNC: 28 MG/DL (ref 8–22)
CALCIUM SERPL-MCNC: 9.7 MG/DL (ref 8.5–10.5)
CHLORIDE SERPL-SCNC: 107 MMOL/L (ref 96–112)
CO2 SERPL-SCNC: 20 MMOL/L (ref 20–33)
CREAT SERPL-MCNC: 1.2 MG/DL (ref 0.5–1.4)
EKG IMPRESSION: NORMAL
EKG IMPRESSION: NORMAL
EOSINOPHIL # BLD AUTO: 0.05 K/UL (ref 0–0.51)
EOSINOPHIL NFR BLD: 1.2 % (ref 0–6.9)
ERYTHROCYTE [DISTWIDTH] IN BLOOD BY AUTOMATED COUNT: 46.5 FL (ref 35.9–50)
GFR SERPLBLD CREATININE-BSD FMLA CKD-EPI: 48 ML/MIN/1.73 M 2
GLUCOSE BLD STRIP.AUTO-MCNC: 154 MG/DL (ref 65–99)
GLUCOSE BLD STRIP.AUTO-MCNC: 211 MG/DL (ref 65–99)
GLUCOSE SERPL-MCNC: 102 MG/DL (ref 65–99)
HCT VFR BLD AUTO: 41.2 % (ref 37–47)
HGB BLD-MCNC: 13.1 G/DL (ref 12–16)
IMM GRANULOCYTES # BLD AUTO: 0.01 K/UL (ref 0–0.11)
IMM GRANULOCYTES NFR BLD AUTO: 0.2 % (ref 0–0.9)
LYMPHOCYTES # BLD AUTO: 0.84 K/UL (ref 1–4.8)
LYMPHOCYTES NFR BLD: 20.1 % (ref 22–41)
MCH RBC QN AUTO: 28.2 PG (ref 27–33)
MCHC RBC AUTO-ENTMCNC: 31.8 G/DL (ref 32.2–35.5)
MCV RBC AUTO: 88.8 FL (ref 81.4–97.8)
MONOCYTES # BLD AUTO: 0.35 K/UL (ref 0–0.85)
MONOCYTES NFR BLD AUTO: 8.4 % (ref 0–13.4)
NEUTROPHILS # BLD AUTO: 2.91 K/UL (ref 1.82–7.42)
NEUTROPHILS NFR BLD: 69.6 % (ref 44–72)
NRBC # BLD AUTO: 0 K/UL
NRBC BLD-RTO: 0 /100 WBC (ref 0–0.2)
PLATELET # BLD AUTO: 113 K/UL (ref 164–446)
PMV BLD AUTO: 12.5 FL (ref 9–12.9)
POTASSIUM SERPL-SCNC: 4.4 MMOL/L (ref 3.6–5.5)
RBC # BLD AUTO: 4.64 M/UL (ref 4.2–5.4)
SODIUM SERPL-SCNC: 139 MMOL/L (ref 135–145)
WBC # BLD AUTO: 4.2 K/UL (ref 4.8–10.8)

## 2023-11-25 PROCEDURE — A9270 NON-COVERED ITEM OR SERVICE: HCPCS | Performed by: INTERNAL MEDICINE

## 2023-11-25 PROCEDURE — 85025 COMPLETE CBC W/AUTO DIFF WBC: CPT

## 2023-11-25 PROCEDURE — 700111 HCHG RX REV CODE 636 W/ 250 OVERRIDE (IP): Performed by: INTERNAL MEDICINE

## 2023-11-25 PROCEDURE — 700102 HCHG RX REV CODE 250 W/ 637 OVERRIDE(OP): Performed by: INTERNAL MEDICINE

## 2023-11-25 PROCEDURE — 99232 SBSQ HOSP IP/OBS MODERATE 35: CPT | Performed by: INTERNAL MEDICINE

## 2023-11-25 PROCEDURE — 93010 ELECTROCARDIOGRAM REPORT: CPT | Performed by: INTERNAL MEDICINE

## 2023-11-25 PROCEDURE — RXMED WILLOW AMBULATORY MEDICATION CHARGE: Performed by: HOSPITALIST

## 2023-11-25 PROCEDURE — 99239 HOSP IP/OBS DSCHRG MGMT >30: CPT | Performed by: HOSPITALIST

## 2023-11-25 PROCEDURE — 82962 GLUCOSE BLOOD TEST: CPT | Mod: 91

## 2023-11-25 PROCEDURE — 80048 BASIC METABOLIC PNL TOTAL CA: CPT

## 2023-11-25 RX ORDER — METOPROLOL SUCCINATE 25 MG/1
25 TABLET, EXTENDED RELEASE ORAL DAILY
Qty: 30 TABLET | Refills: 0 | Status: SHIPPED | OUTPATIENT
Start: 2023-11-25 | End: 2024-01-30

## 2023-11-25 RX ORDER — TACROLIMUS 1 MG/1
2 CAPSULE ORAL 2 TIMES DAILY
Qty: 120 CAPSULE | Refills: 0 | Status: SHIPPED | OUTPATIENT
Start: 2023-11-25

## 2023-11-25 RX ORDER — AMIODARONE HYDROCHLORIDE 200 MG/1
200 TABLET ORAL DAILY
Status: DISCONTINUED | OUTPATIENT
Start: 2023-11-25 | End: 2023-11-25 | Stop reason: HOSPADM

## 2023-11-25 RX ORDER — AMLODIPINE BESYLATE 5 MG/1
5 TABLET ORAL
Status: DISCONTINUED | OUTPATIENT
Start: 2023-11-25 | End: 2023-11-25 | Stop reason: HOSPADM

## 2023-11-25 RX ORDER — AMIODARONE HYDROCHLORIDE 200 MG/1
200 TABLET ORAL DAILY
Qty: 30 TABLET | Refills: 2 | Status: SHIPPED | OUTPATIENT
Start: 2023-11-26 | End: 2023-12-13 | Stop reason: SDUPTHER

## 2023-11-25 RX ADMIN — PREDNISONE 5 MG: 10 TABLET ORAL at 05:05

## 2023-11-25 RX ADMIN — APIXABAN 5 MG: 5 TABLET, FILM COATED ORAL at 05:05

## 2023-11-25 RX ADMIN — AMIODARONE HYDROCHLORIDE 0.5 MG/MIN: 1.8 INJECTION, SOLUTION INTRAVENOUS at 04:29

## 2023-11-25 RX ADMIN — LEVOTHYROXINE SODIUM 75 MCG: 0.07 TABLET ORAL at 05:06

## 2023-11-25 RX ADMIN — MYCOPHENOLATE MOFETIL 250 MG: 250 CAPSULE ORAL at 07:49

## 2023-11-25 RX ADMIN — AMIODARONE HYDROCHLORIDE 200 MG: 200 TABLET ORAL at 07:49

## 2023-11-25 RX ADMIN — INSULIN HUMAN 3 UNITS: 100 INJECTION, SOLUTION PARENTERAL at 12:00

## 2023-11-25 RX ADMIN — AMLODIPINE BESYLATE 5 MG: 5 TABLET ORAL at 11:59

## 2023-11-25 RX ADMIN — INSULIN HUMAN 2 UNITS: 100 INJECTION, SOLUTION PARENTERAL at 09:08

## 2023-11-25 RX ADMIN — TACROLIMUS 2 MG: 1 CAPSULE ORAL at 05:06

## 2023-11-25 RX ADMIN — SODIUM BICARBONATE 1300 MG: 650 TABLET ORAL at 05:06

## 2023-11-25 ASSESSMENT — FIBROSIS 4 INDEX: FIB4 SCORE: 3.41

## 2023-11-25 ASSESSMENT — PAIN DESCRIPTION - PAIN TYPE: TYPE: ACUTE PAIN

## 2023-11-25 NOTE — PROGRESS NOTES
D/c instructions given, educated on worsening s/s. Pt understands and questions answered. D/c home with daughter in law

## 2023-11-25 NOTE — CARE PLAN
The patient is Stable - Low risk of patient condition declining or worsening    Shift Goals  Clinical Goals: monitor HR, rhythm, hemodynamic stability  Patient Goals: to get better    Progress made toward(s) clinical / shift goals:      Patient is not progressing towards the following goals:      Problem: Pain - Standard  Goal: Alleviation of pain or a reduction in pain to the patient’s comfort goal  Outcome: Met     Problem: Knowledge Deficit - Standard  Goal: Patient and family/care givers will demonstrate understanding of plan of care, disease process/condition, diagnostic tests and medications  Outcome: Met

## 2023-11-25 NOTE — CARE PLAN
The patient is Stable - Low risk of patient condition declining or worsening    Shift Goals  Clinical Goals: monitor HR, rhythm, hemodynamic stability  Patient Goals: to get better    Progress made toward(s) clinical / shift goals:    Problem: Pain - Standard  Goal: Alleviation of pain or a reduction in pain to the patient’s comfort goal  Description: Target End Date:  Prior to discharge or change in level of care    Document on Vitals flowsheet    1.  Document pain using the appropriate pain scale per order or unit policy  2.  Educate and implement non-pharmacologic comfort measures (i.e. relaxation, distraction, massage, cold/heat therapy, etc.)  3.  Pain management medications as ordered  4.  Reassess pain after pain med administration per policy  5.  If opiods administered assess patient's response to pain medication is appropriate per POSS sedation scale  6.  Follow pain management plan developed in collaboration with patient and interdisciplinary team (including palliative care or pain specialists if applicable)  Outcome: Progressing     Problem: Knowledge Deficit - Standard  Goal: Patient and family/care givers will demonstrate understanding of plan of care, disease process/condition, diagnostic tests and medications  Description: Target End Date:  1-3 days or as soon as patient condition allows    Document in Patient Education    1.  Patient and family/caregiver oriented to unit, equipment, visitation policy and means for communicating concern  2.  Complete/review Learning Assessment  3.  Assess knowledge level of disease process/condition, treatment plan, diagnostic tests and medications  4.  Explain disease process/condition, treatment plan, diagnostic tests and medications  Outcome: Progressing       Patient is not progressing towards the following goals:

## 2023-11-25 NOTE — PROGRESS NOTES
Telemetry Shift Summary     Rhythm: SB   HR: 47-55  Ectopy: NA    Measurements: .21/.04/.43  .          Normal Values  Rhythm: SR  HR:   Measurements: 0.12-0.20/0.08-0.10/0.30-0.52

## 2023-11-25 NOTE — PROGRESS NOTES
Cardiology Progress Note    Interval History:  Patient is being seen in cardiac follow-up for atrial fibrillation with RVR    Patient converted to sinus rhythm over night.  Currently sinus bradycardia with heart rates in the 50 to 60 bpm.  She was evaluated at bedside this morning.  She states that she feels significantly improved since admission.  No longer with palpitations, chest pain or dyspnea.  Notes having a mild headache but otherwise no complaints from cardiac standpoint.    Telemetry: Sinus bradycardia      Medications / Drug list prior to admission:  No current facility-administered medications on file prior to encounter.     Current Outpatient Medications on File Prior to Encounter   Medication Sig Dispense Refill    tacrolimus (PROGRAF) 1 MG Cap Take 1 mg by mouth every morning.      ELIQUIS 5 MG Tab TAKE 1 TABLET BY MOUTH TWICE A  Tablet 1    furosemide (LASIX) 80 MG Tab Take 80 mg by mouth every day.      amLODIPine (NORVASC) 5 MG Tab Take 1 Tablet by mouth every day. 90 Tablet 3    sodium bicarbonate (SODIUM BICARBONATE) 650 MG Tab Take 2 Tablets by mouth 2 times a day. 120 Tablet 3    mycophenolate (CELLCEPT) 250 MG Cap Take 1 Capsule by mouth 2 times a day. 20 Capsule 0    levothyroxine (SYNTHROID) 75 MCG Tab Take 1 Tablet by mouth every morning on an empty stomach. 90 Tablet 4    atorvastatin (LIPITOR) 20 MG Tab Take 1 Tablet by mouth every evening. 100 Tablet 3    insulin aspart (NOVOLOG FLEXPEN) 100 UNIT/ML injection PEN Inject 0-12 Units under the skin 4 Times a Day,Before Meals and at Bedtime. Unspecified sliding scale.      predniSONE (DELTASONE) 5 MG Tab Take 5 mg by mouth every day.         Current list of administered Medications:    Current Facility-Administered Medications:     amiodarone (Cordarone) tablet 200 mg, 200 mg, Oral, DAILY, Arthur GARVEY M.D., 200 mg at 11/25/23 0749    amLODIPine (Norvasc) tablet 5 mg, 5 mg, Oral, Q DAY, Arthur GARVEY M.D.    dextrose 5% infusion, ,  Intravenous, Continuous, Klaus Campa M.D., Stopped at 11/25/23 0600    atorvastatin (Lipitor) tablet 20 mg, 20 mg, Oral, Nightly, Klaus Campa M.D., 20 mg at 11/24/23 2054    apixaban (Eliquis) tablet 5 mg, 5 mg, Oral, BID, Klaus Campa M.D., 5 mg at 11/25/23 0505    levothyroxine (Synthroid) tablet 75 mcg, 75 mcg, Oral, AM ES, Klaus Campa M.D., 75 mcg at 11/25/23 0506    predniSONE (Deltasone) tablet 5 mg, 5 mg, Oral, DAILY, Klaus Campa M.D., 5 mg at 11/25/23 0505    sodium bicarbonate tablet 1,300 mg, 1,300 mg, Oral, BID, Klaus Campa M.D., 1,300 mg at 11/25/23 0506    mycophenolate (Cellcept) capsule 250 mg, 250 mg, Oral, BID, Klaus Campa M.D., 250 mg at 11/25/23 0749    Respiratory Therapy Consult, , Nebulization, Continuous RT, Klaus Campa M.D.    acetaminophen (Tylenol) tablet 650 mg, 650 mg, Oral, Q6HRS PRN, Klaus Campa M.D.    ondansetron (Zofran) syringe/vial injection 4 mg, 4 mg, Intravenous, Q4HRS PRN, Klaus Campa M.D.    ondansetron (Zofran ODT) dispertab 4 mg, 4 mg, Oral, Q4HRS PRN, Klaus Campa M.D.    senna-docusate (Pericolace Or Senokot S) 8.6-50 MG per tablet 2 Tablet, 2 Tablet, Oral, BID **AND** polyethylene glycol/lytes (Miralax) PACKET 1 Packet, 1 Packet, Oral, QDAY PRN **AND** magnesium hydroxide (Milk Of Magnesia) suspension 30 mL, 30 mL, Oral, QDAY PRN **AND** bisacodyl (Dulcolax) suppository 10 mg, 10 mg, Rectal, QDAY PRN, Klaus Campa M.D.    Notify provider if pain remains uncontrolled, , , CONTINUOUS **AND** Use the Numeric Rating Scale (NRS), Bruno-Baker Faces (WBF), or FLACC on regular floors and Critical-Care Pain Observation Tool (CPOT) on ICUs/Trauma to assess pain, , , CONTINUOUS **AND** Pulse Ox, , , CONTINUOUS **AND** Pharmacy Consult Request ...Pain Management Review 1 Each, 1 Each, Other, PHARMACY TO DOSE **AND** If patient difficult to arouse and/or has respiratory depression (respiratory rate of 10 or less), stop any opiates that are  currently infusing and call a Rapid Response., , , CONTINUOUS, Klaus Campa M.D.    oxyCODONE immediate-release (Roxicodone) tablet 2.5 mg, 2.5 mg, Oral, Q3HRS PRN **OR** oxyCODONE immediate-release (Roxicodone) tablet 5 mg, 5 mg, Oral, Q3HRS PRN **OR** HYDROmorphone (Dilaudid) injection 0.25 mg, 0.25 mg, Intravenous, Q3HRS PRN, Klaus Campa M.D.    labetalol (Normodyne/Trandate) injection 10 mg, 10 mg, Intravenous, Q4HRS PRN, Klaus Campa M.D.    insulin regular (HumuLIN R,NovoLIN R) injection, 2-9 Units, Subcutaneous, 4X/DAY ACHS, 2 Units at 23 0908 **AND** POC blood glucose manual result, , , Q AC AND BEDTIME(S) **AND** NOTIFY MD and PharmD, , , Once **AND** Administer 20 grams of glucose (approximately 8 ounces of fruit juice) every 15 minutes PRN FSBG less than 70 mg/dL, , , PRN **AND** dextrose 10 % BOLUS 25 g, 25 g, Intravenous, Q15 MIN PRN, Klaus Campa M.D.    tacrolimus (Prograf) capsule 2 mg, 2 mg, Oral, BID, Klaus Campa M.D., 2 mg at 23 0506    Past Medical History:   Diagnosis Date    Acquired hypothyroidism 2020    CAD (coronary artery disease)     Chronic diastolic heart failure (HCC) 2020    Coronary artery disease due to lipid rich plaque     2 Synergy NOEMI to 100% RCA stent placed    Dental disorder     partial dentures- uppers    Diabetes ([facility])     oral medication    ESRD (end stage renal disease) on dialysis ([facility]) 2020    Hemodialysis patient ([facility])     M, W, F    Hyperlipidemia     Hypertension     Kidney transplant candidate     Kidney transplant recipient 10/31/2022    Presence of drug-eluting stent in right coronary artery     QT prolongation 2020    RLS (restless legs syndrome) 2016    Transaminitis 2018       Past Surgical History:   Procedure Laterality Date    URETERAL REIMPLANTATION  2023    transplant ureter reimplantation - [facility] [place]    OTHER ABDOMINAL SURGERY Right 10/31/2022     [facility] CARDIAC CATH  09/07/2016    RCA stented with 2 Synergy drug-eluting stents.    RECOVERY  08/16/2016    Procedure: CATH LAB [facility] WITH POSSIBLE DR. CASTILLO;  Surgeon: [facility];  Location: SURGERY PRE-POST PROC UNIT [facility] [place];  Service:     [facility] CARDIAC CATH  08/16/2016    100% RCA    OTHER Left 2014    left arm upper extremity fistula    OTHER ABDOMINAL SURGERY      left kidney removed due to cancer       Family History   Problem Relation Age of Onset    Diabetes Sister     Other Sister         liver disease    Diabetes Brother     Heart Disease Neg Hx      Patient family history was personally reviewed, no pertinent family history to current presentation    Social History     Tobacco Use    Smoking status: Never    Smokeless tobacco: Never   Vaping Use    Vaping Use: Never used   Substance Use Topics    Alcohol use: No     Alcohol/week: 0.0 oz    Drug use: No       ALLERGIES:  No Known Allergies    Review of systems:  A complete review of symptoms was reviewed with the patient. This is reviewed in H&P and PMH. ALL OTHERS reviewed and negative    Physical exam:  Patient Vitals for the past 24 hrs:   BP Temp Temp src Pulse Resp SpO2 Weight   11/25/23 0814 (!) 172/60 36 °C (96.8 °F) Temporal (!) 59 18 96 % --   11/25/23 0531 (!) 140/60 (!) 35.8 °C (96.4 °F) Temporal (!) 54 17 90 % 60 kg (132 lb 4.4 oz)   11/25/23 0029 (!) 144/60 36.3 °C (97.3 °F) Temporal (!) 50 16 91 % --   11/24/23 2041 (!) 166/60 36.6 °C (97.8 °F) Temporal (!) 54 17 91 % --   11/24/23 1612 122/59 36.2 °C (97.2 °F) Temporal -- 18 94 % --   11/24/23 1143 122/54 36.1 °C (96.9 °F) Temporal -- 19 93 % --       General: Not in acute distress, lying comfortably in bed  Neck:  No carotid bruits, No JVD appreciated  CVS:  Bradycardic, regular rhythm, Normal S1, S2. No murmurs, rubs or gallops  Resp: Normal respiratory effort, lungs CTA bilaterally. No rales or rhonchi  Abdomen: Soft, non-distended, non-tender to  palpation  Skin: no cyanosis  Neurological: Alert and oriented x3, moves all extremities, no focal neurologic deficits  Extremities:   Extremities warm, pulses intact, no lower extremity edema bilaterally      Data:  Laboratory studies personally reviewed by me:  Recent Results (from the past 24 hour(s))   EKG    Collection Time: 23 10:40 AM   Result Value Ref Range    Report       [facility]    Test Date:  2023  Pt Name:    DIOR NICHOLS    Department: 171  MRN:        [id number]                      Room:       T723  Gender:     Female                       Technician: KATHLEEN  :        1950                   Requested By:JOSELIN SUGGS  Order #:    [id number]                    Reading MD: Arthur Perry MD    Measurements  Intervals                                Axis  Rate:       52                           P:          246  NM:         133                          QRS:        98  QRSD:       83                           T:          229  QT:         455  QTc:        424    Interpretive Statements  Sinus or ectopic atrial bradycardia  Right axis deviation  Consider left ventricular hypertrophy  Repol abnrm, prob ischemia, anterolateral lds  Baseline wander in lead(s) V1  Electronically Signed On 2023 06:38:28 PST by Arthur Perry MD     EKG (NOW)    Collection Time: 23 10:41 AM   Result Value Ref Range    Report       [facility]    Test Date:  2023  Pt Name:    DIOR NICHOLS    Department: ER  MRN:        [id number]                      Room:       T723  Gender:     Female                       Technician: KATHLEEN  :        1950                   Requested By:ERICK MURRELL  Order #:    [id number]                    Reading MD: Arthur Perry MD    Measurements  Intervals                                Axis  Rate:       52                           P:          -66  NM:         130                          QRS:        98  QRSD:       85                            T:          245  QT:         449  QTc:        418    Interpretive Statements  Sinus or ectopic atrial bradycardia  Right axis deviation  Consider left ventricular hypertrophy  Repol abnrm, prob ischemia, anterolateral lds  Electronically Signed On 11- 06:38:40 PST by Arthur Perry MD     POCT glucose device results    Collection Time: 11/24/23 12:12 PM   Result Value Ref Range    POC Glucose, Blood 226 (H) 65 - 99 mg/dL   TROPONIN    Collection Time: 11/24/23  2:38 PM   Result Value Ref Range    Troponin T 60 (H) 6 - 19 ng/L   POCT glucose device results    Collection Time: 11/24/23  5:32 PM   Result Value Ref Range    POC Glucose, Blood 200 (H) 65 - 99 mg/dL   TROPONIN    Collection Time: 11/24/23  8:44 PM   Result Value Ref Range    Troponin T 56 (H) 6 - 19 ng/L   POCT glucose device results    Collection Time: 11/24/23  8:50 PM   Result Value Ref Range    POC Glucose, Blood 195 (H) 65 - 99 mg/dL   CBC with Differential    Collection Time: 11/25/23  4:53 AM   Result Value Ref Range    WBC 4.2 (L) 4.8 - 10.8 K/uL    RBC 4.64 4.20 - 5.40 M/uL    Hemoglobin 13.1 12.0 - 16.0 g/dL    Hematocrit 41.2 37.0 - 47.0 %    MCV 88.8 81.4 - 97.8 fL    MCH 28.2 27.0 - 33.0 pg    MCHC 31.8 (L) 32.2 - 35.5 g/dL    RDW 46.5 35.9 - 50.0 fL    Platelet Count 113 (L) 164 - 446 K/uL    MPV 12.5 9.0 - 12.9 fL    Neutrophils-Polys 69.60 44.00 - 72.00 %    Lymphocytes 20.10 (L) 22.00 - 41.00 %    Monocytes 8.40 0.00 - 13.40 %    Eosinophils 1.20 0.00 - 6.90 %    Basophils 0.50 0.00 - 1.80 %    Immature Granulocytes 0.20 0.00 - 0.90 %    Nucleated RBC 0.00 0.00 - 0.20 /100 WBC    Neutrophils (Absolute) 2.91 1.82 - 7.42 K/uL    Lymphs (Absolute) 0.84 (L) 1.00 - 4.80 K/uL    Monos (Absolute) 0.35 0.00 - 0.85 K/uL    Eos (Absolute) 0.05 0.00 - 0.51 K/uL    Baso (Absolute) 0.02 0.00 - 0.12 K/uL    Immature Granulocytes (abs) 0.01 0.00 - 0.11 K/uL    NRBC (Absolute) 0.00 K/uL   Basic Metabolic Panel (BMP)    Collection Time:  11/25/23  4:53 AM   Result Value Ref Range    Sodium 139 135 - 145 mmol/L    Potassium 4.4 3.6 - 5.5 mmol/L    Chloride 107 96 - 112 mmol/L    Co2 20 20 - 33 mmol/L    Glucose 102 (H) 65 - 99 mg/dL    Bun 28 (H) 8 - 22 mg/dL    Creatinine 1.20 0.50 - 1.40 mg/dL    Calcium 9.7 8.5 - 10.5 mg/dL    Anion Gap 12.0 7.0 - 16.0   ESTIMATED GFR    Collection Time: 11/25/23  4:53 AM   Result Value Ref Range    GFR (CKD-EPI) 48 (A) >60 mL/min/1.73 m 2   POCT glucose device results    Collection Time: 11/25/23  7:51 AM   Result Value Ref Range    POC Glucose, Blood 154 (H) 65 - 99 mg/dL       Imaging:  DX-CHEST-PORTABLE (1 VIEW)   Final Result         No acute cardiac or pulmonary abnormality is identified.          EKG tracings personally reviewed by me shows atrial fibrillation with RVR     Echo 9/6/2023:  Normal left ventricular size, thickness, and systolic function.  The left ventricular ejection fraction is visually estimated to be 65-70%.  Normal right ventricular size and systolic function.  Estimated right ventricular systolic pressure is 40 mmHg.  Mild tricuspid regurgitation.  Normal inferior vena cava size and inspiratory collapse.     Stress 12/2022:  NUCLEAR IMAGING INTERPRETATION   Normal Lexiscan myocardial perfusion study.   No evidence of ischemia or infarct.   SDS 0.   LVEF 70%.   No ischemic changes with Regadenoson.   No arrhythmias.   No chest pain.   ECG INTERPRETATION   Negative stress ECG for ischemia.     Cardiac Cath 2021:  Left Main: Large caliber bifurcating no CAD.  Left Anterior Descending: Moderate to large caliber vessel with usual complement of diagonals.  Mild nonobstructive CAD.  Left Circumflex: Large caliber Co-dominant supplying multiple large tortuous obtuse marginals with mild nonobstructive CAD.  Right Coronary: Small caliber supplying a moderate-sized RPDA.  This is a codominant fashion.  There is a widely patent long segment of previously placed stents in the midportion.      All  pertinent features of laboratory and imaging reviewed including primary images where applicable      Principal Problem:    Atrial fibrillation with rapid ventricular response (HCC) (POA: Yes)  Active Problems:    Elevated troponin (POA: Yes)    Mixed hyperlipidemia (POA: Yes)    Chronic heart failure with preserved ejection fraction (HCC) (POA: Yes)    Acquired hypothyroidism (POA: Yes)    Type 2 diabetes mellitus (HCC) (POA: Yes)    Stage 3b chronic kidney disease (HCC) (POA: Yes)    Acquired circulating anticoagulants (HCC) (POA: Yes)  Resolved Problems:    * No resolved hospital problems. *      Assessment / Plan:  This is a 72-year-old woman with past medical history significant for heart failure with preserved ejection fraction, CAD, CKD status post renal transplant in October 2022, history of persistent atrial fibrillation on Eliquis admitted with atrial fibrillation with rapid ventricular response.     1.  Atrial fibrillation with rapid ventricular response  2.  Persistent AF, on Eliquis  3.  Mild nonobstructive CAD  4.  CKD status post renal transplant  5.  Mildly elevated troponin     Recommendations:  Converted back to sinus rhythm overnight.  Remains asymptomatic and stable from a cardiovascular standpoint.  Continue amiodarone 200 mg p.o.  Continue Eliquis for systemic anticoagulation given elevated [id number] score  We will stop metoprolol given advanced age and the fact that she is on amiodarone  Blood pressure is elevated today, we will restart home dose of amlodipine to 5 mg daily.  If BP remains elevated, can uptitrate amlodipine further  Review of EKG while in sinus shows T wave inversions in V5-V6.  These findings have been seen on prior EKGs during last admission as well as in 2021 prior to getting her coronary angiogram which showed mild nonobstructive CAD.  At this time, no additional cardiac recommendations.  Patient will need to follow-up in our office as an outpatient.  Cardiology will  sign off.      I personally discussed her case with  Dr Ana Luisa Roth M.D.      It is my pleasure to participate in the care of Ms. Kenyetta Gallegos.  Please do not hesitate to contact me with questions or concerns.    Arthur Perry MD  Cardiologist, [facility]    11/25/2023    Please note that this dictation was created using voice recognition software. I have made every reasonable attempt to correct obvious errors, but it is possible there are errors of grammar and possibly content that I did not discover before finalizing the note.

## 2023-11-25 NOTE — PROGRESS NOTES
Patient's home medications at bedside. Patient would like family to  home meds and not to be sent to pharmacy.

## 2023-11-25 NOTE — DISCHARGE SUMMARY
Discharge Summary    CHIEF COMPLAINT ON ADMISSION  Chief Complaint   Patient presents with    Chest Pain     Left sided chest pain radiating to the left arm and jaw. Started around 2 am.       Reason for Admission  Chest pain     Admission Date  2023    CODE STATUS  Full Code    HPI & HOSPITAL COURSE    History of Presenting Illness  Tere Gallegos is a 72 y.o. female with hypothyroidism, chronic HFpEF, RCA  NOEMI , CKD status post  donor renal transplant in 2022, and history of paroxysmal atrial fibrillation on anticoagulation who presented 2023 with acute onset chest pain which woke her up from sleep at around 2 AM.  Pain was located in the middle of her chest, and radiated to the left arm.  She also had palpitations and headaches.  She denied any shortness of breath.  She had no other complaints such as nausea, vomiting, and abdominal pain.  Otherwise she was doing well prior to the onset of her symptoms.  Cardiac cath :  mild nonobstructive CAD and patent RCA stent.     ED course:  On evaluation, she was noted to be in A-fib with RVR, with heart rate in the 120s to 140s.  She was requiring 2 L of oxygen by nasal cannula.  Initial blood workup showed normal WBC count, normal electrolytes, and creatinine of 1.23.  LFTs were normal.  Troponin was 44 and 53.  Chest x-ray (personally reviewed) did not show infiltrates or consolidation.  EKG (my read) showed atrial fibrillation with RVR.  Patient was initially given IV Cardizem which decreased blood pressure.  She was also given digoxin which did not help with the A-fib.  Cardiology was consulted who recommended starting her on amiodarone.  She was subsequently admitted to the hospitalist service.      Patient converted overnight to NSR, she was mildly bradycardic on metoprolol.  BP elevated 170/60, HR 64 therefore added lower dose metoprolol 25mg XL daily.  No need for lasix, likely this is an old medication that patient  used to be on.   Patient appears euvolemic, no JVD, on RA, CXR no pulmonary edema seen.  No further chest pain.  Chest pain likely from demand ischemia given afib with RVR.    Recent cath 2021 negative for obstructive disease.  Patient was on RA, NSR, ambulating and eating, no further chest pain.      Therefore, she is discharged in good and stable condition to home with close outpatient follow-up.    The patient recovered much more quickly than anticipated on admission.    Discharge Date  11/25/2023    FOLLOW UP ITEMS POST DISCHARGE  Follow up with cardiology in 2 weeks for recheck.    DISCHARGE DIAGNOSES  Principal Problem:    Atrial fibrillation with rapid ventricular response (HCC) (POA: Yes)  Active Problems:    Elevated troponin (POA: Yes)    Mixed hyperlipidemia (POA: Yes)    Chronic heart failure with preserved ejection fraction (HCC) (POA: Yes)    Acquired hypothyroidism (POA: Yes)    Kidney transplant recipient (Chronic) (POA: Yes)    Type 2 diabetes mellitus (HCC) (POA: Yes)    Stage 3b chronic kidney disease (HCC) (POA: Yes)    Acquired circulating anticoagulants (HCC) (POA: Yes)  Resolved Problems:    * No resolved hospital problems. *      FOLLOW UP  Future Appointments   Date Time Provider Department Center   11/27/2023  6:30 AM MAIN LAB [facility] None   12/8/2023  4:00 PM ANGELITA Concepcion   12/15/2023  3:30 PM Priyank Villar M.D. [facility] [address]   12/29/2023  4:15 PM Milton Pettit M.D. [facility] None   3/20/2024  3:00 PM ANGELITA Guzmán M.D.  [address]  [facility] [id number]  [phone]    Schedule an appointment as soon as possible for a visit in 2 week(s)  for recheck afib with RVR converted to SR with amiodarone IV loading.      MEDICATIONS ON DISCHARGE     Medication List        START taking these medications        Instructions   amiodarone 200 MG Tabs  Start taking on: November 26, 2023  Commonly known as: Cordarone   [place] 1 tableta  por vía oral diariamente.  (Take 1 Tablet by mouth every day.)  Dose: 200 mg     metoprolol SR 25 MG Tb24  Commonly known as: Toprol XL   [place] 1 tableta por vía oral diariamente.  (Take 1 Tablet by mouth every day.)  Dose: 25 mg            CONTINUE taking these medications        Instructions   amLODIPine 5 MG Tabs  Commonly known as: Norvasc   Take 1 Tablet by mouth every day.  Dose: 5 mg     atorvastatin 20 MG Tabs  Commonly known as: Lipitor   Take 1 Tablet by mouth every evening.  Dose: 20 mg     Eliquis 5mg Tabs  Generic drug: apixaban   Doctor's comments: DX Code Needed  .  TAKE 1 TABLET BY MOUTH TWICE A DAY  Dose: 5 mg     levothyroxine 75 MCG Tabs  Commonly known as: Synthroid   Take 1 Tablet by mouth every morning on an empty stomach.  Dose: 75 mcg     mycophenolate 250 MG Caps  Commonly known as: Cellcept   Take 1 Capsule by mouth 2 times a day.  Dose: 250 mg     NovoLOG FlexPen 100 UNIT/ML injection PEN  Generic drug: insulin aspart   Inject 0-12 Units under the skin 4 Times a Day,Before Meals and at Bedtime. Unspecified sliding scale.  Dose: 0-12 Units     predniSONE 5 MG Tabs  Commonly known as: Deltasone   Take 5 mg by mouth every day.  Dose: 5 mg     sodium bicarbonate 650 MG Tabs  Commonly known as: Sodium Bicarbonate   Take 2 Tablets by mouth 2 times a day.  Dose: 1,300 mg     * tacrolimus 1 MG Caps  Commonly known as: Prograf   Take 1 mg by mouth every morning.  Dose: 1 mg     * tacrolimus 1 MG Caps  Commonly known as: Prograf   Doctor's comments: No new med  Take 2 Capsules by mouth 2 times a day.  Dose: 2 mg           * This list has 2 medication(s) that are the same as other medications prescribed for you. Read the directions carefully, and ask your doctor or other care provider to review them with you.                STOP taking these medications      furosemide 80 MG Tabs  Commonly known as: Lasix              Allergies  No Known Allergies    DIET  Orders Placed This Encounter   Procedures     Diet Order Diet: Cardiac     Standing Status:   Standing     Number of Occurrences:   1     Order Specific Question:   Diet:     Answer:   Cardiac [6]       ACTIVITY  As tolerated.  Weight bearing as tolerated    CONSULTATIONS  Cardiology Dr. Perry    PROCEDURES    Transthoracic  Echo Report        Echocardiography Laboratory     CONCLUSIONS  Normal left ventricular size, thickness, and systolic function.  The left ventricular ejection fraction is visually estimated to be 65-  70%.  Normal right ventricular size and systolic function.  Estimated right ventricular systolic pressure is 40 mmHg.  Mild tricuspid regurgitation.  Normal inferior vena cava size and inspiratory collapse.     DIOR RAMIRES  Exam Date:         2023                      13:20  Exam Location:     Inpatient  Priority:          Routine     Ordering Physician:        GOVIND KILLIAN  Referring Physician:  Sonographer:               Neetu Pruitt                              [facility] [[email or web address]]     Age:    72     Gender:    F  MRN:    [id number]  :    1950  BSA:    1.6    Ht (in):    64     Wt (lb):    125  Exam Type:     Limited     Indications:     Atrial Fibrillation  ICD Codes:       427.31     CPT Codes:       73015     BP:   111    /   74     HR:   55  Technical Quality:     MEASUREMENTS  (Male / Female) Normal Values  2D ECHO  LV Diastolic Diameter PLAX        5.3 cm                4.2 - 5.9 / 3.9 - 5.3   cm  LV Systolic Diameter PLAX         2 cm                  2.1 - 4.0 cm  IVS Diastolic Thickness           0.8 cm                  LVPW Diastolic Thickness          0.8 cm                  LVOT Diameter                     1.8 cm                  Estimated LV Ejection Fraction    65 %                    LV Ejection Fraction MOD BP       75 %                  >= 55  %  LV Ejection Fraction MOD 4C       82.4 %                  LV Ejection Fraction MOD 2C       67.7 %                  LV Ejection Fraction 4C AL        85.5 %                   LV Ejection Fraction 2C AL        69.8 %                     DOPPLER  TR Peak Velocity                  306 cm/s                   * Indicates values subject to auto-interpretation  LV EF:  65    %     FINDINGS  Left Ventricle  Normal left ventricular size, thickness, and systolic function. The   left ventricular ejection fraction is visually estimated to be 65-70%.   No regional wall motion abnormalities.     Right Ventricle  Normal right ventricular size and systolic function.     Right Atrium  Normal inferior vena cava size and inspiratory collapse.     Left Atrium        Mitral Valve        Aortic Valve        Tricuspid Valve  Structurally normal tricuspid valve without significant stenosis. Mild   tricuspid regurgitation. Estimated right ventricular systolic pressure   is 40 mmHg.     Pulmonic Valve        Pericardium        Aorta  Normal aortic root for body surface area. The ascending aorta diameter   is 2.8 cm.                                Jose Willis MD  (Electronically Signed)  Final Date:     06 September 2023   LABORATORY  Lab Results   Component Value Date    SODIUM 139 11/25/2023    POTASSIUM 4.4 11/25/2023    CHLORIDE 107 11/25/2023    CO2 20 11/25/2023    GLUCOSE 102 (H) 11/25/2023    BUN 28 (H) 11/25/2023    CREATININE 1.20 11/25/2023        Lab Results   Component Value Date    WBC 4.2 (L) 11/25/2023    HEMOGLOBIN 13.1 11/25/2023    HEMATOCRIT 41.2 11/25/2023    PLATELETCT 113 (L) 11/25/2023        Total time of the discharge process exceeds 45 minutes.   No

## 2023-11-27 ENCOUNTER — PATIENT OUTREACH (OUTPATIENT)
Dept: MEDICAL GROUP | Facility: MEDICAL CENTER | Age: 73
End: 2023-11-27
Payer: MEDICARE

## 2023-11-27 ENCOUNTER — HOSPITAL ENCOUNTER (OUTPATIENT)
Dept: LAB | Facility: MEDICAL CENTER | Age: 73
End: 2023-11-27
Attending: INTERNAL MEDICINE
Payer: MEDICARE

## 2023-11-27 LAB
ANION GAP SERPL CALC-SCNC: 10 MMOL/L (ref 7–16)
BASOPHILS # BLD AUTO: 0.5 % (ref 0–1.8)
BASOPHILS # BLD: 0.02 K/UL (ref 0–0.12)
BUN SERPL-MCNC: 27 MG/DL (ref 8–22)
CALCIUM SERPL-MCNC: 10.2 MG/DL (ref 8.4–10.2)
CHLORIDE SERPL-SCNC: 105 MMOL/L (ref 96–112)
CO2 SERPL-SCNC: 23 MMOL/L (ref 20–33)
CREAT SERPL-MCNC: 1.23 MG/DL (ref 0.5–1.4)
EOSINOPHIL # BLD AUTO: 0.04 K/UL (ref 0–0.51)
EOSINOPHIL NFR BLD: 1 % (ref 0–6.9)
ERYTHROCYTE [DISTWIDTH] IN BLOOD BY AUTOMATED COUNT: 46.4 FL (ref 35.9–50)
GFR SERPLBLD CREATININE-BSD FMLA CKD-EPI: 46 ML/MIN/1.73 M 2
GLUCOSE SERPL-MCNC: 124 MG/DL (ref 65–99)
HCT VFR BLD AUTO: 42.6 % (ref 37–47)
HGB BLD-MCNC: 13.6 G/DL (ref 12–16)
IMM GRANULOCYTES # BLD AUTO: 0.01 K/UL (ref 0–0.11)
IMM GRANULOCYTES NFR BLD AUTO: 0.2 % (ref 0–0.9)
LYMPHOCYTES # BLD AUTO: 0.69 K/UL (ref 1–4.8)
LYMPHOCYTES NFR BLD: 17.1 % (ref 22–41)
MCH RBC QN AUTO: 28.6 PG (ref 27–33)
MCHC RBC AUTO-ENTMCNC: 31.9 G/DL (ref 32.2–35.5)
MCV RBC AUTO: 89.7 FL (ref 81.4–97.8)
MONOCYTES # BLD AUTO: 0.35 K/UL (ref 0–0.85)
MONOCYTES NFR BLD AUTO: 8.7 % (ref 0–13.4)
NEUTROPHILS # BLD AUTO: 2.93 K/UL (ref 1.82–7.42)
NEUTROPHILS NFR BLD: 72.5 % (ref 44–72)
NRBC # BLD AUTO: 0 K/UL
NRBC BLD-RTO: 0 /100 WBC (ref 0–0.2)
PHOSPHATE SERPL-MCNC: 3 MG/DL (ref 2.5–4.5)
PLATELET # BLD AUTO: 118 K/UL (ref 164–446)
PMV BLD AUTO: 12.4 FL (ref 9–12.9)
POTASSIUM SERPL-SCNC: 4.4 MMOL/L (ref 3.6–5.5)
RBC # BLD AUTO: 4.75 M/UL (ref 4.2–5.4)
SODIUM SERPL-SCNC: 138 MMOL/L (ref 135–145)
TACROLIMUS BLD-MCNC: 5.7 NG/ML (ref 5–20)
WBC # BLD AUTO: 4 K/UL (ref 4.8–10.8)

## 2023-11-27 PROCEDURE — 85025 COMPLETE CBC W/AUTO DIFF WBC: CPT

## 2023-11-27 PROCEDURE — 36415 COLL VENOUS BLD VENIPUNCTURE: CPT

## 2023-11-27 PROCEDURE — 84100 ASSAY OF PHOSPHORUS: CPT

## 2023-11-27 PROCEDURE — 80197 ASSAY OF TACROLIMUS: CPT

## 2023-11-27 PROCEDURE — 80048 BASIC METABOLIC PNL TOTAL CA: CPT

## 2023-11-27 NOTE — PROGRESS NOTES
11/27: 1st Patient outreach for TCM.  CRISS with contact information.    11/28: Transitional Care Management  TCM Outreach Date and Time: Filed (11/27/2023  9:18 AM)    Discharge Questions  Actual Discharge Date: 11/25/23  Now that you are home, how are you feeling?: Good  Did you receive any new prescriptions?: Yes  Were you able to get them filled?: Yes  Meds to Bed or Pharmacy filled?: Meds to Bed  Do you have any questions about your current medications or new medications (Review Med Rec)?: No  Do you have a follow up appointment scheduled with your PCP?: Yes  Appointment Date: 11/29/23  Appointment Time: 1100  Any issues or paperwork you wish to discuss with your PCP?: No  Does this patient qualify for the CCM program?: Yes    Transitional Care  Number of attempts made to contact patient: 2  Current or previous attempts competed within two business days of discharge? : Yes  Provided education regarding treatment plan, medications, self-management, ADLs?: Yes  Has patient completed an Advanced Directive?: No  Has the Care Manager's phone number provided?: Yes  Is there anything else I can help you with?: No    Discharge Summary  Chief Complaint: Chest Pain - Left sided chest pain radiating to the left arm and jaw. Started around 2 am.  Admitting Diagnosis: chest pain  Discharge Diagnosis: Afib w/RVR

## 2023-11-29 ENCOUNTER — OFFICE VISIT (OUTPATIENT)
Dept: MEDICAL GROUP | Facility: MEDICAL CENTER | Age: 73
End: 2023-11-29
Payer: MEDICARE

## 2023-11-29 VITALS
WEIGHT: 132 LBS | TEMPERATURE: 97 F | SYSTOLIC BLOOD PRESSURE: 132 MMHG | HEART RATE: 55 BPM | OXYGEN SATURATION: 96 % | DIASTOLIC BLOOD PRESSURE: 60 MMHG | HEIGHT: 62 IN | BODY MASS INDEX: 24.29 KG/M2

## 2023-11-29 DIAGNOSIS — E11.21 TYPE 2 DIABETES MELLITUS WITH DIABETIC NEPHROPATHY, WITH LONG-TERM CURRENT USE OF INSULIN (HCC): ICD-10-CM

## 2023-11-29 DIAGNOSIS — Z79.4 TYPE 2 DIABETES MELLITUS WITH DIABETIC NEPHROPATHY, WITH LONG-TERM CURRENT USE OF INSULIN (HCC): ICD-10-CM

## 2023-11-29 DIAGNOSIS — R53.1 WEAKNESS: ICD-10-CM

## 2023-11-29 DIAGNOSIS — I48.0 PAROXYSMAL A-FIB (HCC): ICD-10-CM

## 2023-11-29 LAB
HBA1C MFR BLD: 6.3 % (ref ?–5.8)
POCT INT CON NEG: NEGATIVE
POCT INT CON POS: POSITIVE

## 2023-11-29 PROCEDURE — 3078F DIAST BP <80 MM HG: CPT | Performed by: NURSE PRACTITIONER

## 2023-11-29 PROCEDURE — 3075F SYST BP GE 130 - 139MM HG: CPT | Performed by: NURSE PRACTITIONER

## 2023-11-29 PROCEDURE — 99496 TRANSJ CARE MGMT HIGH F2F 7D: CPT | Performed by: NURSE PRACTITIONER

## 2023-11-29 PROCEDURE — 83036 HEMOGLOBIN GLYCOSYLATED A1C: CPT | Performed by: NURSE PRACTITIONER

## 2023-11-29 ASSESSMENT — FIBROSIS 4 INDEX: FIB4 SCORE: 3.03

## 2023-11-29 NOTE — PROGRESS NOTES
"Subjective:     Tere Gallegos is a 72 y.o. female who presents for Hospital Follow-up.    HPI:   Recently hospitalized for chest pain, found to be in Afib with RVR causing demand ischemia. She was started on amiodarone inpatient. Converted to sinus rhythm overnight. Her chest pain resolved. She was discharged on amiodarone 200 mg daily, metoprolol SR 25 mg daily.     She is scheduled for follow up with cariology in 4 weeks, however was recommended to see cardiology in 2 weeks.     Today she reports weakness and chest \"tiredness\" NOT pain. Not the same feeling that she had in the hospital. This is a new feeling, started the day after going to the hospital. Feels like her left arm is weak as well. No shortness of breath, nausea, vomiting. Does report a lot of sweating, night sweats, cold chills.     Current medicines (including reconciliation performed today)  Current Outpatient Medications   Medication Sig Dispense Refill    tacrolimus (PROGRAF) 1 MG Cap Take 2 Capsules by mouth 2 times a day. 120 Capsule 0    amiodarone (CORDARONE) 200 MG Tab Take 1 Tablet by mouth every day. 30 Tablet 2    metoprolol SR (TOPROL XL) 25 MG TABLET SR 24 HR Take 1 Tablet by mouth every day. 30 Tablet 0    tacrolimus (PROGRAF) 1 MG Cap Take 1 mg by mouth every morning.      ELIQUIS 5 MG Tab TAKE 1 TABLET BY MOUTH TWICE A  Tablet 1    amLODIPine (NORVASC) 5 MG Tab Take 1 Tablet by mouth every day. 90 Tablet 3    sodium bicarbonate (SODIUM BICARBONATE) 650 MG Tab Take 2 Tablets by mouth 2 times a day. 120 Tablet 3    mycophenolate (CELLCEPT) 250 MG Cap Take 1 Capsule by mouth 2 times a day. 20 Capsule 0    levothyroxine (SYNTHROID) 75 MCG Tab Take 1 Tablet by mouth every morning on an empty stomach. 90 Tablet 4    atorvastatin (LIPITOR) 20 MG Tab Take 1 Tablet by mouth every evening. 100 Tablet 3    insulin aspart (NOVOLOG FLEXPEN) 100 UNIT/ML injection PEN Inject 0-12 Units under the skin 4 Times a Day,Before Meals and " "at Bedtime. Unspecified sliding scale.      predniSONE (DELTASONE) 5 MG Tab Take 5 mg by mouth every day.       No current facility-administered medications for this visit.       Allergies:   Patient has no known allergies.    Social History     Tobacco Use    Smoking status: Never    Smokeless tobacco: Never   Vaping Use    Vaping Use: Never used   Substance Use Topics    Alcohol use: No     Alcohol/week: 0.0 oz    Drug use: No       ROS:  Review of systems (+10 systems) unremarkable except for concerns noted by patient or items listed.    Please see HPI for additional ROS.      Objective:     Vitals:    11/29/23 1058   BP: 132/60   Pulse: (!) 55   Temp: 36.1 °C (97 °F)   TempSrc: Temporal   SpO2: 96%   Weight: 59.9 kg (132 lb)   Height: 1.575 m (5' 2\")     Body mass index is 24.14 kg/m².    Physical Exam:  Constitutional: Alert, no distress.  Skin: Warm, dry, good turgor, no rashes in visible areas.  Eye: Equal, round and reactive, conjunctiva clear, lids normal.  ENMT: Lips without lesions, good dentition  Respiratory: Unlabored respiratory effort, lungs clear to auscultation, no wheezes, no ronchi.  Cardiovascular: Normal S1, S2, no murmur, no edema. + bradycardia  Psych: Alert and oriented x3, normal affect and mood.      Assessment and Plan:   1. Paroxysmal A-fib (HCC)  Stable, EKG shows continued sinus kelby with no acute changes  Continue amiodarone 200 mg daily and metoprolol 25 mg daily  Continue eliquis as prescribed  Contacted cardiology scheduling to see if we can get her a sooner appointment for follow up  Reviewed hospital notes, imaging, and lab results in depth with patient today  Reviewed home medications that she brought in, all are appropriate and present.    services were used in the patient's primary language of [place].  Mode of interpretation: iPad     - EKG - Clinic Performed    2. Weakness  Unstable  EKG with no acute changes today  Likely some residual symptoms of demand " ischemia after hospitalization vs s/e of amiodarone.   - EKG - Clinic Performed    3. Type 2 diabetes mellitus with diabetic nephropathy, with long-term current use of insulin (HCC)  Stable, A1C 6.3 today  Continue medication and follow up per endocrinology  - POCT A1C      - Chart and discharge summary were reviewed.   - Hospitalization and results reviewed with patient.   - Medications reviewed including instructions regarding high risk medications, dosing and side effects.  - Recommended Services: No services needed at this time  - Advance directive/POLST on file?  No     Follow-up:Return in about 3 months (around 2/29/2024).    Face-to-face transitional care management services with HIGH (today's visit is within days post discharge & LACE+ score 59+) medical decision complexity were provided.     LACE+ Historical Score Over Time (0-28: Low, 29-58: Medium, 59+: High): 72

## 2023-11-29 NOTE — LETTER
November 29, 2023        Tere Gallegos  1950        To whom this may concern,          Tere Gallegos is a diabetic patient please approve supplies for glucose monitoring and supplies.    If you have any questions please call our office at [phone]      Thank you,                  SAMMI Yu

## 2023-12-04 ENCOUNTER — ANTICOAGULATION VISIT (OUTPATIENT)
Dept: VASCULAR LAB | Facility: MEDICAL CENTER | Age: 73
End: 2023-12-04
Attending: INTERNAL MEDICINE
Payer: MEDICARE

## 2023-12-04 DIAGNOSIS — D68.69 SECONDARY HYPERCOAGULABLE STATE (HCC): ICD-10-CM

## 2023-12-04 DIAGNOSIS — I48.0 PAROXYSMAL A-FIB (HCC): ICD-10-CM

## 2023-12-04 PROCEDURE — 99213 OFFICE O/P EST LOW 20 MIN: CPT

## 2023-12-05 NOTE — PROGRESS NOTES
Target end date: Indefinite  Indication: AF  Drug: Eliquis 5 mg bid    CHADsVASC = 5  HAS-BLED = 2-3    Utilized translation services for this encounter.  Language Line - [phone]  [employer] ID - [id number]   Vicki, ID # [id number]    Health Status Since Last Assessment  Pt denies any new relevant medical problems, ED visits or hospitalizations  Pt denies any embolic events (stroke/tia/systemic embolism)  Pt was admitted on 11/24 for AF    Adherence with DOAC Therapy  Pt has not missed doses in the average week.    Bleeding Risk Assessment  Pt denies epistaxis  Pt denies any hematuria  Pt denies any excessive or unusual bleeding/hematomas.  Pt denies any GI bleeds or hematemesis.  Pt denies any concerning daily headache or subdural hematoma symptoms.    Latest Hemoglobin: WNL  Hemoglobin   Date Value Ref Range Status   11/27/2023 13.6 12.0 - 16.0 g/dL Final     Latest Platelets:  low, but will CTM  Platelet Count   Date Value Ref Range Status   11/27/2023 118 (L) 164 - 446 K/uL Final       Pt denies  ETOH overuse     Creatinine Clearance/Renal Function  Latest SCR<1.5, age<80, wt<60kg - renal dose adjustment not indicated    Hepatic function  Latest LFTs: WNL  Pt denies any history of liver dysfunction        Drug Interactions  ASA/other antiplatelets: None  NSAID: None  Other drug interactions: None  Verified no anticonvulsant or azole therapy, education provided for future use.     Examination  Blood Pressure   There were no vitals filed for this visit.  Symptomatic hypotension: Denies   Significant gait impairment/imbalance/high risk for falls? no    Final Assessment and Recommendations:  Patient appears stable from the anticoagulation standpoint.    Benefits of continued DOAC therapy outweigh risks for this patient  Recommend pt continue with current DOAC therapy: Eliquis 5 mg bid    DOAC is affordable     Other Actions: CMP/CBC hemogram ordered prior to next visit    Follow up:  Will follow up  with patient 3 month(s).     Elissa Yañez, PharmD

## 2023-12-07 ENCOUNTER — TELEPHONE (OUTPATIENT)
Dept: NEPHROLOGY | Facility: MEDICAL CENTER | Age: 73
End: 2023-12-07
Payer: MEDICARE

## 2023-12-07 ENCOUNTER — TELEPHONE (OUTPATIENT)
Dept: HEALTH INFORMATION MANAGEMENT | Facility: OTHER | Age: 73
End: 2023-12-07
Payer: MEDICARE

## 2023-12-13 ENCOUNTER — OFFICE VISIT (OUTPATIENT)
Dept: CARDIOLOGY | Facility: MEDICAL CENTER | Age: 73
End: 2023-12-13
Attending: HOSPITALIST
Payer: MEDICARE

## 2023-12-13 VITALS
RESPIRATION RATE: 16 BRPM | WEIGHT: 130 LBS | SYSTOLIC BLOOD PRESSURE: 124 MMHG | OXYGEN SATURATION: 98 % | DIASTOLIC BLOOD PRESSURE: 66 MMHG | HEART RATE: 64 BPM | HEIGHT: 62 IN | BODY MASS INDEX: 23.92 KG/M2

## 2023-12-13 DIAGNOSIS — I48.0 PAF (PAROXYSMAL ATRIAL FIBRILLATION) (HCC): ICD-10-CM

## 2023-12-13 DIAGNOSIS — Z79.4 TYPE 2 DIABETES MELLITUS WITH DIABETIC NEPHROPATHY, WITH LONG-TERM CURRENT USE OF INSULIN (HCC): ICD-10-CM

## 2023-12-13 DIAGNOSIS — I25.84 CORONARY ARTERY DISEASE DUE TO CALCIFIED CORONARY LESION: ICD-10-CM

## 2023-12-13 DIAGNOSIS — E11.21 TYPE 2 DIABETES MELLITUS WITH DIABETIC NEPHROPATHY, WITH LONG-TERM CURRENT USE OF INSULIN (HCC): ICD-10-CM

## 2023-12-13 DIAGNOSIS — N18.6 ESRD (END STAGE RENAL DISEASE) (HCC): Chronic | ICD-10-CM

## 2023-12-13 DIAGNOSIS — I25.10 CORONARY ARTERY DISEASE DUE TO CALCIFIED CORONARY LESION: ICD-10-CM

## 2023-12-13 DIAGNOSIS — N18.31 STAGE 3A CHRONIC KIDNEY DISEASE: ICD-10-CM

## 2023-12-13 PROBLEM — I50.32 CHRONIC DIASTOLIC CHF (CONGESTIVE HEART FAILURE) (HCC): Status: RESOLVED | Noted: 2022-01-25 | Resolved: 2023-12-13

## 2023-12-13 PROCEDURE — 99214 OFFICE O/P EST MOD 30 MIN: CPT | Performed by: INTERNAL MEDICINE

## 2023-12-13 PROCEDURE — 3078F DIAST BP <80 MM HG: CPT | Performed by: INTERNAL MEDICINE

## 2023-12-13 PROCEDURE — 99213 OFFICE O/P EST LOW 20 MIN: CPT | Performed by: INTERNAL MEDICINE

## 2023-12-13 PROCEDURE — 3074F SYST BP LT 130 MM HG: CPT | Performed by: INTERNAL MEDICINE

## 2023-12-13 RX ORDER — AMIODARONE HYDROCHLORIDE 200 MG/1
200 TABLET ORAL DAILY
Qty: 100 TABLET | Refills: 3 | Status: SHIPPED | OUTPATIENT
Start: 2023-12-13

## 2023-12-13 ASSESSMENT — FIBROSIS 4 INDEX: FIB4 SCORE: 3.07

## 2023-12-13 NOTE — PROGRESS NOTES
"CARDIOLOGY OUTPATIENT FOLLOWUP    PCP: ANGELITA Concepcion    1. PAF (paroxysmal atrial fibrillation) ([facility])    2. Coronary artery disease due to calcified coronary lesion    3. ESRD s/p kidney transplant 10/31/22    4. Type 2 diabetes mellitus with diabetic nephropathy, with long-term current use of insulin (HCC)    5. Stage 3a chronic kidney disease (HCC)        Tere Gallegos is not experiencing any further attacks of atrial fibrillation since the implementation of amiodarone.  For now I recommend continuing with this course of treatment though did introduce the possibility of pursuing catheter ablation in the future.  Coronary disease stable and LDL is been under good control.  I recommend continuing the present medication regimen.    Follow up: 6 months    History: Tere Gallegos is a 73 y.o. female with PAF, coronary artery disease with RCA , normal LVEF, status post renal transplant and diabetes presenting for follow-up.    She has been hospitalized twice in the past several months with symptomatic atrial fibrillation.  She has been started on amiodarone and had no recurrence of the symptoms since 24 November.  A tacrolimus level was obtained which was around 5.  She does report ongoing follow-up with her transplant nephrologist who has been aware of the changes in therapies.      Physical Exam:  /66 (BP Location: Left arm, Patient Position: Sitting, BP Cuff Size: Adult)   Pulse 64   Resp 16   Ht 1.575 m (5' 2\")   Wt 59 kg (130 lb)   LMP  (LMP Unknown)   SpO2 98%   BMI 23.78 kg/m²   GEN: NAD  RESP: CTAB  CVS: RRR, No M/R/G  ABD: Soft, NT/ND  EXT: WWP, no edema    The ASCVD Risk score (Jose JONES, et al., 2019) failed to calculate.     I did personally review EKGs which showed atrial fibrillation as the presenting EKG on both the last 2 hospitalizations with subsequent conversion to sinus rhythm.    Studies  Lab Results   Component Value Date/Time    CHOLSTRLTOT 97 (L) " 02/18/2023 04:17 AM    LDL 37 02/18/2023 04:17 AM    HDL 31 (A) 02/18/2023 04:17 AM    TRIGLYCERIDE 144 02/18/2023 04:17 AM       Lab Results   Component Value Date/Time    SODIUM 138 11/27/2023 06:36 AM    POTASSIUM 4.4 11/27/2023 06:36 AM    CHLORIDE 105 11/27/2023 06:36 AM    CO2 23 11/27/2023 06:36 AM    GLUCOSE 124 (H) 11/27/2023 06:36 AM    BUN 27 (H) 11/27/2023 06:36 AM    CREATININE 1.23 11/27/2023 06:36 AM    BUNCREATRAT 8 (L) 01/28/2021 07:00 AM     Lab Results   Component Value Date/Time    ALKPHOSPHAT 77 11/24/2023 05:25 AM    ASTSGOT 25 11/24/2023 05:25 AM    ALTSGPT 26 11/24/2023 05:25 AM    TBILIRUBIN 0.4 11/24/2023 05:25 AM        Past Medical History:   Diagnosis Date    Acquired hypothyroidism 05/04/2020    CAD (coronary artery disease)     Chronic diastolic heart failure (formerly [facility]) 05/04/2020    Coronary artery disease due to lipid rich plaque     2 Synergy NOEMI to 100% RCA stent placed    Dental disorder     partial dentures- uppers    Diabetes (formerly [facility])     oral medication    ESRD (end stage renal disease) on dialysis (formerly [facility]) 05/04/2020    Hemodialysis patient (formerly [facility])     M, W, F    Hyperlipidemia     Hypertension     Kidney transplant candidate     Kidney transplant recipient 10/31/2022    Presence of drug-eluting stent in right coronary artery     QT prolongation 01/22/2020    RLS (restless legs syndrome) 08/05/2016    Transaminitis 12/22/2018     No Known Allergies  Outpatient Encounter Medications as of 12/13/2023   Medication Sig Dispense Refill    amiodarone (CORDARONE) 200 MG Tab Take 1 Tablet by mouth every day. 100 Tablet 3    tacrolimus (PROGRAF) 1 MG Cap Take 2 Capsules by mouth 2 times a day. 120 Capsule 0    metoprolol SR (TOPROL XL) 25 MG TABLET SR 24 HR Take 1 Tablet by mouth every day. 30 Tablet 0    tacrolimus (PROGRAF) 1 MG Cap Take 1 mg by mouth every morning.      ELIQUIS 5 MG Tab TAKE 1 TABLET BY MOUTH TWICE A  Tablet 1    amLODIPine (NORVASC) 5 MG Tab Take 1 Tablet by mouth every  day. 90 Tablet 3    sodium bicarbonate (SODIUM BICARBONATE) 650 MG Tab Take 2 Tablets by mouth 2 times a day. 120 Tablet 3    mycophenolate (CELLCEPT) 250 MG Cap Take 1 Capsule by mouth 2 times a day. 20 Capsule 0    levothyroxine (SYNTHROID) 75 MCG Tab Take 1 Tablet by mouth every morning on an empty stomach. 90 Tablet 4    atorvastatin (LIPITOR) 20 MG Tab Take 1 Tablet by mouth every evening. 100 Tablet 3    insulin aspart (NOVOLOG FLEXPEN) 100 UNIT/ML injection PEN Inject 0-12 Units under the skin 4 Times a Day,Before Meals and at Bedtime. Unspecified sliding scale.      predniSONE (DELTASONE) 5 MG Tab Take 5 mg by mouth every day.      [DISCONTINUED] amiodarone (CORDARONE) 200 MG Tab Take 1 Tablet by mouth every day. 30 Tablet 2     No facility-administered encounter medications on file as of 12/13/2023.     Social History     Socioeconomic History    Marital status:      Spouse name: Not on file    Number of children: Not on file    Years of education: Not on file    Highest education level: Not on file   Occupational History    Not on file   Tobacco Use    Smoking status: Never    Smokeless tobacco: Never   Vaping Use    Vaping Use: Never used   Substance and Sexual Activity    Alcohol use: No     Alcohol/week: 0.0 oz    Drug use: No    Sexual activity: Never   Other Topics Concern    Not on file   Social History Narrative    Not on file     Social Determinants of Health     Financial Resource Strain: Not on file   Food Insecurity: Not on file   Transportation Needs: Not on file   Physical Activity: Not on file   Stress: Not on file   Social Connections: Not on file   Intimate Partner Violence: Not on file   Housing Stability: Not on file         ROS:   10 point review systems is otherwise negative except as per the HPI    Chief Complaint   Patient presents with    Atrial Fibrillation    Hyperlipidemia    Bradycardia

## 2023-12-15 ENCOUNTER — TELEPHONE (OUTPATIENT)
Dept: CARDIOLOGY | Facility: MEDICAL CENTER | Age: 73
End: 2023-12-15

## 2023-12-15 ENCOUNTER — OFFICE VISIT (OUTPATIENT)
Dept: NEPHROLOGY | Facility: MEDICAL CENTER | Age: 73
End: 2023-12-15
Payer: MEDICARE

## 2023-12-15 VITALS
RESPIRATION RATE: 18 BRPM | HEIGHT: 62 IN | SYSTOLIC BLOOD PRESSURE: 140 MMHG | TEMPERATURE: 97.6 F | HEART RATE: 53 BPM | WEIGHT: 130 LBS | OXYGEN SATURATION: 98 % | BODY MASS INDEX: 23.92 KG/M2 | DIASTOLIC BLOOD PRESSURE: 88 MMHG

## 2023-12-15 DIAGNOSIS — N18.6 ESRD (END STAGE RENAL DISEASE) (HCC): Chronic | ICD-10-CM

## 2023-12-15 DIAGNOSIS — N18.9 ACUTE KIDNEY INJURY SUPERIMPOSED ON CHRONIC KIDNEY DISEASE (HCC): ICD-10-CM

## 2023-12-15 DIAGNOSIS — N18.31 STAGE 3A CHRONIC KIDNEY DISEASE: ICD-10-CM

## 2023-12-15 DIAGNOSIS — B96.29 UTI DUE TO EXTENDED-SPECTRUM BETA LACTAMASE (ESBL) PRODUCING ESCHERICHIA COLI: ICD-10-CM

## 2023-12-15 DIAGNOSIS — I48.21 PERMANENT ATRIAL FIBRILLATION (HCC): Chronic | ICD-10-CM

## 2023-12-15 DIAGNOSIS — N39.0 UTI DUE TO EXTENDED-SPECTRUM BETA LACTAMASE (ESBL) PRODUCING ESCHERICHIA COLI: ICD-10-CM

## 2023-12-15 DIAGNOSIS — Z16.12 UTI DUE TO EXTENDED-SPECTRUM BETA LACTAMASE (ESBL) PRODUCING ESCHERICHIA COLI: ICD-10-CM

## 2023-12-15 DIAGNOSIS — I50.32 CHRONIC HEART FAILURE WITH PRESERVED EJECTION FRACTION (HCC): ICD-10-CM

## 2023-12-15 DIAGNOSIS — Z94.0 KIDNEY TRANSPLANT RECIPIENT: Chronic | ICD-10-CM

## 2023-12-15 DIAGNOSIS — E11.21 TYPE 2 DIABETES MELLITUS WITH DIABETIC NEPHROPATHY, WITH LONG-TERM CURRENT USE OF INSULIN (HCC): ICD-10-CM

## 2023-12-15 DIAGNOSIS — D61.818 PANCYTOPENIA (HCC): Chronic | ICD-10-CM

## 2023-12-15 DIAGNOSIS — N17.9 ACUTE KIDNEY INJURY SUPERIMPOSED ON CHRONIC KIDNEY DISEASE (HCC): ICD-10-CM

## 2023-12-15 DIAGNOSIS — Z79.899 LONG TERM CURRENT USE OF IMMUNOSUPPRESSIVE DRUG: Chronic | ICD-10-CM

## 2023-12-15 DIAGNOSIS — Z79.4 TYPE 2 DIABETES MELLITUS WITH DIABETIC NEPHROPATHY, WITH LONG-TERM CURRENT USE OF INSULIN (HCC): ICD-10-CM

## 2023-12-15 PROBLEM — I48.91 ATRIAL FIBRILLATION WITH RAPID VENTRICULAR RESPONSE (HCC): Status: RESOLVED | Noted: 2023-11-24 | Resolved: 2023-12-15

## 2023-12-15 PROBLEM — D84.821 IMMUNOSUPPRESSION DUE TO DRUG THERAPY (HCC): Status: ACTIVE | Noted: 2023-12-15

## 2023-12-15 PROCEDURE — 3079F DIAST BP 80-89 MM HG: CPT | Performed by: INTERNAL MEDICINE

## 2023-12-15 PROCEDURE — 3077F SYST BP >= 140 MM HG: CPT | Performed by: INTERNAL MEDICINE

## 2023-12-15 PROCEDURE — 99214 OFFICE O/P EST MOD 30 MIN: CPT | Performed by: INTERNAL MEDICINE

## 2023-12-15 RX ORDER — DOCUSATE SODIUM 100 MG/1
100 CAPSULE, LIQUID FILLED ORAL 2 TIMES DAILY PRN
Qty: 180 CAPSULE | Refills: 3 | Status: SHIPPED | OUTPATIENT
Start: 2023-12-15

## 2023-12-15 RX ORDER — SODIUM BICARBONATE 650 MG/1
650 TABLET ORAL 2 TIMES DAILY
Qty: 180 TABLET | Refills: 3 | Status: SHIPPED | OUTPATIENT
Start: 2023-12-15

## 2023-12-15 ASSESSMENT — ENCOUNTER SYMPTOMS
SHORTNESS OF BREATH: 1
FEVER: 0
ABDOMINAL PAIN: 0

## 2023-12-15 ASSESSMENT — FIBROSIS 4 INDEX: FIB4 SCORE: 3.07

## 2023-12-15 NOTE — PROGRESS NOTES
Chief Complaint   Patient presents with    Follow-Up    Kidney Transplant       CC: f/u CKD and transplant  She is with daughter-in-law, Edna.      services were used in the patient's primary language of English.   Name or Number: [id number]  Mode of interpretation: iPad      HPI:  Tere Gallegos is a 73 y.o. female with a history of end-stage renal disease status post  donor kidney transplant 10[place]31/2022 [place] [facility] notable for delayed graft function requiring dialysis until mid 2022, also complicated by a distal ureteral stricture requiring percutaneous transplant nephrostomy followed by ureteral stenting in May 2023 and ureteral re-implantation on 23, diabetes, hypertension, permanent atrial fibrillation, pancytopenia, BK viremia who presents today for follow-up.    Patient was hospitalized in mid May 2023.  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  She required percutaneous nephrostomy on 2023.  She was then hospitalized at [facility] in [place] in late May, 2023. She had PCN removed and ureteral stent placed.  She had ureteral reimplantation done 2023.    Patient hospitalized in early 2023 with chest pain, atrial fibrillation with RVR, improved with medical management. She was admitted in late 2023 with concern for UTI or abscess. She still has burning when she urinates    Re: ESRD, patient was on dialysis since .  The etiology of ESRD was thought to be due to diabetes, hypertension, and solitary kidney.  Patient was anuric prior to kidney transplant 10/31/2022.  Patient had delayed graft function and required dialysis until 2022.  Posttransplant course has been complicated by pancytopenia and BK viremia and BK viruria.   Denies taking NSAIDs. Still complains of dysuria as above. She's been taking sodium bicarb 650mg bid, but I told her I want her to take 1300mg BID.     Re: immunosuppression, patient was  induced with Simulect on 10/31/2022.  Patient was originally on mycophenolate 1000 mg p.o. twice daily, tacrolimus 1 mg p.o. twice daily, and prednisone 10 mg daily shortly after transplant.  Her dosages were weaned down. She is taking prednisone 5mg daily, Tacrolimus 2mg but isn't sure if she takes only in the morning or BID. She takes MMF 250mg BID.  I called the patient's son to clarify medication dosages, but he did not answer, and I left a voicemail.    Re: hypertension, she denies LE edema currently. She complains of Light-headedness sometimes with standing. She checks BP at home but can't remember her numbers.  She remains on amlodipine 5mg daily, but was told to stop losartan. Doesn't remember who told her to stop losartan.     Re: DM2, she takes insulin only. A1C was 6.3% on 23.         Past Medical History:   Diagnosis Date    Acquired hypothyroidism 2020    CAD (coronary artery disease)     Chronic diastolic heart failure (HCC) 2020    Coronary artery disease due to lipid rich plaque     2 Synergy NOEMI to 100% RCA stent placed    Dental disorder     partial dentures- uppers    Diabetes ([facility])     oral medication    ESRD (end stage renal disease) on dialysis ([facility]) 2020    Hemodialysis patient ([facility])     M, W, F    Hyperlipidemia     Hypertension     Kidney transplant candidate     Kidney transplant recipient 10/31/2022    Presence of drug-eluting stent in right coronary artery     QT prolongation 2020    RLS (restless legs syndrome) 2016    Transaminitis 2018     Past Surgical History:   Procedure Laterality Date    URETERAL REIMPLANTATION  2023    [place] [facility] [place]    OTHER ABDOMINAL SURGERY Right 10/31/2022     Donor kidney transplant - [facility]    ZZZ CARDIAC CATH  2016    RCA stented with 2 Synergy drug-eluting stents.    RECOVERY  2016    Procedure: CATH LAB [facility] WITH POSSIBLE DR. CASTILLO;   Surgeon: Recoveryonly Surgery;  Location: SURGERY PRE-POST PROC UNIT [facility] [place];  Service:     ZZZ CARDIAC CATH  08/16/2016    100% RCA    OTHER Left 2014    left arm upper extremity fistula    OTHER ABDOMINAL SURGERY      left kidney removed due to cancer         Outpatient Encounter Medications as of 12/15/2023   Medication Sig Dispense Refill    docusate sodium (COLACE) 100 MG Cap Take 1 Capsule by mouth 2 times a day as needed for Constipation (Constipation). 180 Capsule 3    amiodarone (CORDARONE) 200 MG Tab Take 1 Tablet by mouth every day. 100 Tablet 3    tacrolimus (PROGRAF) 1 MG Cap Take 2 Capsules by mouth 2 times a day. 120 Capsule 0    metoprolol SR (TOPROL XL) 25 MG TABLET SR 24 HR Take 1 Tablet by mouth every day. 30 Tablet 0    ELIQUIS 5 MG Tab TAKE 1 TABLET BY MOUTH TWICE A  Tablet 1    amLODIPine (NORVASC) 5 MG Tab Take 1 Tablet by mouth every day. 90 Tablet 3    sodium bicarbonate (SODIUM BICARBONATE) 650 MG Tab Take 2 Tablets by mouth 2 times a day. 120 Tablet 3    mycophenolate (CELLCEPT) 250 MG Cap Take 1 Capsule by mouth 2 times a day. 20 Capsule 0    levothyroxine (SYNTHROID) 75 MCG Tab Take 1 Tablet by mouth every morning on an empty stomach. 90 Tablet 4    atorvastatin (LIPITOR) 20 MG Tab Take 1 Tablet by mouth every evening. 100 Tablet 3    insulin aspart (NOVOLOG FLEXPEN) 100 UNIT/ML injection PEN Inject 0-12 Units under the skin 4 Times a Day,Before Meals and at Bedtime. Unspecified sliding scale.      predniSONE (DELTASONE) 5 MG Tab Take 5 mg by mouth every day.      [DISCONTINUED] tacrolimus (PROGRAF) 1 MG Cap Take 1 mg by mouth every morning.      [DISCONTINUED] tacrolimus (PROGRAF) 1 MG Cap Take 2 Capsules by mouth 2 times a day. 120 Capsule 0    [DISCONTINUED] losartan (COZAAR) 100 MG Tab Take 100 mg by mouth every day.      [DISCONTINUED] furosemide (LASIX) 80 MG Tab Take 80 mg by mouth every day.      [DISCONTINUED] apixaban (ELIQUIS) 5mg Tab Take 5 mg by mouth 2 times a day.       "[] amiodarone (CORDARONE) 200 MG Tab Take 1 Tablet by mouth every day for 30 days. 30 Tablet 0     No facility-administered encounter medications on file as of 12/15/2023.        No Known Allergies    Review of Systems   Constitutional:  Negative for fever.   Respiratory:  Positive for shortness of breath (sometimes with exertion).    Cardiovascular:  Negative for chest pain.   Gastrointestinal:  Negative for abdominal pain.   Genitourinary:  Positive for dysuria.   All other systems reviewed and are negative.      BP (!) 140/88 (BP Location: Right arm, Patient Position: Sitting, BP Cuff Size: Adult)   Pulse (!) 53   Temp 36.4 °C (97.6 °F) (Temporal)   Resp 18   Ht 1.575 m (5' 2\")   Wt 59 kg (130 lb)   LMP  (LMP Unknown)   SpO2 98%   BMI 23.78 kg/m²     Physical Exam  Constitutional:       General: She is not in acute distress.  HENT:      Mouth/Throat:      Pharynx: No oropharyngeal exudate.   Eyes:      General: No scleral icterus.  Neck:      Trachea: No tracheal deviation.   Cardiovascular:      Rate and Rhythm: Normal rate and regular rhythm.      Heart sounds: Murmur heard.   Pulmonary:      Effort: Pulmonary effort is normal.      Breath sounds: Normal breath sounds. No stridor. No rales.   Abdominal:      General: Bowel sounds are normal.      Palpations: Abdomen is soft.      Tenderness: There is no abdominal tenderness.   Musculoskeletal:         General: Normal range of motion.      Cervical back: Neck supple.      Right lower leg: No edema.      Left lower leg: No edema.   Skin:     General: Skin is warm and dry.      Findings: No rash.   Neurological:      General: No focal deficit present.      Mental Status: She is alert and oriented to person, place, and time.   Psychiatric:         Mood and Affect: Mood and affect normal.         Behavior: Behavior normal.   Dialysis access: Left brachiobasilic AV fistula, with patent bruit and thrill.         Labs reviewed.  Recent Labs     " 01/23/23  0848 01/30/23  0853 02/18/23  0417 02/19/23  0059 10/02/23  1815 10/06/23  0648 11/06/23  0658 11/13/23  0713 11/20/23  0709 11/24/23  0525 11/25/23  0453 11/27/23  0636   ALBUMIN 4.3   < > 3.6   < > 4.0  --  4.3  --   --  4.4  --   --    HDL 45  --  31*  --   --   --   --   --   --   --   --   --    TRIGLYCERIDE 109  --  144  --   --   --   --   --   --   --   --   --    SODIUM 138   < > 139   < > 140   < > 139   < > 139 139 139 138   POTASSIUM 4.9   < > 3.4*   < > 4.7   < > 4.5   < > 4.5 3.9 4.4 4.4   CHLORIDE 108   < > 110   < > 107   < > 107   < > 105 105 107 105   CO2 20   < > 18*   < > 22   < > 22   < > 23 20 20 23   BUN 26*   < > 25*   < > 21   < > 23*   < > 27* 29* 28* 27*   CREATININE 1.00   < > 0.87   < > 0.94   < > 1.00   < > 0.99 1.23 1.20 1.23   PHOSPHORUS  --    < > 2.8   < >  --    < >  --    < > 3.2  --   --  3.0  3.0    < > = values in this interval not displayed.       Lab Results   Component Value Date/Time    WBC 4.0 (L) 11/27/2023 06:36 AM    RBC 4.75 11/27/2023 06:36 AM    HEMOGLOBIN 13.6 11/27/2023 06:36 AM    HEMATOCRIT 42.6 11/27/2023 06:36 AM    MCV 89.7 11/27/2023 06:36 AM    MCH 28.6 11/27/2023 06:36 AM    MCHC 31.9 (L) 11/27/2023 06:36 AM    MPV 12.4 11/27/2023 06:36 AM                  URINALYSIS:  Lab Results   Component Value Date/Time    COLORURINE Yellow 11/06/2023 0658    CLARITY Hazy (A) 11/06/2023 0658    SPECGRAVITY 1.010 11/06/2023 0658    PHURINE 6.5 11/06/2023 0658    KETONES Negative 11/06/2023 0658    PROTEINURIN Negative 11/06/2023 0658    BILIRUBINUR Negative 11/06/2023 0658    UROBILU 0.2 09/22/2023 2235    NITRITE Positive (A) 11/06/2023 0658    LEUKESTERAS Small (A) 11/06/2023 0658    OCCULTBLOOD Negative 11/06/2023 0658       UPC  Lab Results   Component Value Date/Time    TOTPROTUR 16.0 (H) 09/14/2023 0707      Lab Results   Component Value Date/Time    CREATININEU 81.98 09/14/2023 0707           Imaging report(s) reviewed  No orders to display          Assessment:  Tere Gallegos is a 73 y.o. female with a history of end-stage renal disease status post  donor kidney transplant 10/31/2022 at [facility] notable for delayed graft function requiring dialysis until mid 2022, also complicated by a distal ureteral stricture requiring percutaneous transplant nephrostomy followed by ureteral stenting in May 2023 and ureteral re-implantation on 23, diabetes, hypertension, permanent atrial fibrillation, pancytopenia, BK viremia who presents today for follow-up.    Plan:  1. ESRD s/p kidney transplant 10/31/22  -Original ESRD diagnosis from solitary kidney, diabetes, hypertension.  Started hemodialysis in .  Patient remains with functioning kidney transplant.  She remains off of dialysis since 2022.      2.  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  I recommend avoiding NSAIDs and other nephrotoxins.  I recommend a low-sodium diet.    3.  Hypertension  - Uncontrolled in the office, but patient having feelings of lightheadedness at home.  She had previously been prescribed Lasix and losartan, unclear why she is off of these 2 medications right now.  She is currently only prescribed amlodipine and metoprolol.  Fortunately, she does not have lower extremity edema.  If hypertension persists in the future, and if potassium remains controlled, I would likely recommend stopping amlodipine and resuming losartan 100 mg daily.  However, patient presents alone today, and is confused about what medications she takes, so I hesitate to make too many changes to her medications today.    4. Permanent atrial fibrillation (HCC)  -Controlled with metoprolol, apixaban, and amiodarone.    5. Long term current use of immunosuppressive drug complicated by BK viremia  -Patient is unaware of the doses of medicines she takes.  She is unsure if she takes  tacrolimus once a day or twice a day, but I suspect she is taking tacrolimus 2 mg p.o. twice daily, mycophenolate 250 mg p.o. twice daily, and prednisone 5 mg p.o. once daily, and I recommend she continue these doses.  She has a history of BK virus, continue to monitor BK virus in the urine and the blood.    6. Pancytopenia (HCC)  -Persistent, but mild.  This was present prior to transplant as well.  Continue low-dose mycophenolate 250 mg p.o. twice daily.    7.  ESBL E. coli UTI  - Noted historically, and again in November 2023.  Patient does complain of dysuria.  Infectious disease previously did not recommend ciprofloxacin due to concern for QTc prolongation, and patient did not previously have improvement with Bactrim.  Therefore, I am concerned about possible oral options, and I recommend referral to infectious disease.    8.  Hyperkalemia  - Currently resolved.  If it recurs, I would recommend resuming Lasix 80 mg once daily.    9.  Metabolic acidosis  - Controlled.  Continue sodium bicarbonate 650 mg p.o. twice daily.       Recommend at least standing monthly labs.  Return to clinic in 6 months with pre-clinic labs, and continue to bring home medications and blood pressure log, and ideally family member that knows her medications.      Priyank Villar MD  Nephrology  [facility]

## 2023-12-16 NOTE — PATIENT INSTRUCTIONS
Decrease sodium bicarbonate from 1300mg to 650mg twice per day.     No need to resume losartan right now.    Please continue to take the following medications for prevention of transplant rejection  - tacrolimus 2mg in morning AND night  - mycophenolate 250mg in morning AND night  - prednisone 5mg daily

## 2023-12-19 DIAGNOSIS — R30.0 DYSURIA: ICD-10-CM

## 2023-12-20 DIAGNOSIS — B96.20 E. COLI UTI: ICD-10-CM

## 2023-12-20 DIAGNOSIS — N39.0 E. COLI UTI: ICD-10-CM

## 2023-12-20 RX ORDER — NITROFURANTOIN 25; 75 MG/1; MG/1
100 CAPSULE ORAL 2 TIMES DAILY
Qty: 14 CAPSULE | Refills: 0 | Status: SHIPPED | OUTPATIENT
Start: 2023-12-20 | End: 2023-12-27

## 2023-12-21 NOTE — PROGRESS NOTES
On repeat review of Urine culture, macrobid is an available oral option, will Rx macrobid for E Coli UTI.     Priyank Villar MD  Nephrology  [facility]

## 2024-01-02 ENCOUNTER — HOSPITAL ENCOUNTER (OUTPATIENT)
Dept: LAB | Facility: MEDICAL CENTER | Age: 74
End: 2024-01-02
Attending: INTERNAL MEDICINE
Payer: MEDICARE

## 2024-01-02 LAB
ALBUMIN SERPL BCP-MCNC: 4.2 G/DL (ref 3.2–4.9)
ALBUMIN/GLOB SERPL: 1.8 G/DL
ALP SERPL-CCNC: 82 U/L (ref 30–99)
ALT SERPL-CCNC: 16 U/L (ref 2–50)
ANION GAP SERPL CALC-SCNC: 9 MMOL/L (ref 7–16)
APPEARANCE UR: CLEAR
AST SERPL-CCNC: 16 U/L (ref 12–45)
BASOPHILS # BLD AUTO: 0.2 % (ref 0–1.8)
BASOPHILS # BLD: 0.01 K/UL (ref 0–0.12)
BILIRUB SERPL-MCNC: 0.4 MG/DL (ref 0.1–1.5)
BILIRUB UR QL STRIP.AUTO: NEGATIVE
BUN SERPL-MCNC: 36 MG/DL (ref 8–22)
CALCIUM ALBUM COR SERPL-MCNC: 9.9 MG/DL (ref 8.5–10.5)
CALCIUM SERPL-MCNC: 10.1 MG/DL (ref 8.4–10.2)
CHLORIDE SERPL-SCNC: 107 MMOL/L (ref 96–112)
CO2 SERPL-SCNC: 23 MMOL/L (ref 20–33)
COLOR UR: YELLOW
CREAT SERPL-MCNC: 1.19 MG/DL (ref 0.5–1.4)
EOSINOPHIL # BLD AUTO: 0.04 K/UL (ref 0–0.51)
EOSINOPHIL NFR BLD: 0.8 % (ref 0–6.9)
ERYTHROCYTE [DISTWIDTH] IN BLOOD BY AUTOMATED COUNT: 46.1 FL (ref 35.9–50)
GFR SERPLBLD CREATININE-BSD FMLA CKD-EPI: 48 ML/MIN/1.73 M 2
GLOBULIN SER CALC-MCNC: 2.4 G/DL (ref 1.9–3.5)
GLUCOSE SERPL-MCNC: 117 MG/DL (ref 65–99)
GLUCOSE UR STRIP.AUTO-MCNC: NEGATIVE MG/DL
HCT VFR BLD AUTO: 42.5 % (ref 37–47)
HGB BLD-MCNC: 13.4 G/DL (ref 12–16)
IMM GRANULOCYTES # BLD AUTO: 0.02 K/UL (ref 0–0.11)
IMM GRANULOCYTES NFR BLD AUTO: 0.4 % (ref 0–0.9)
KETONES UR STRIP.AUTO-MCNC: NEGATIVE MG/DL
LEUKOCYTE ESTERASE UR QL STRIP.AUTO: NEGATIVE
LYMPHOCYTES # BLD AUTO: 0.66 K/UL (ref 1–4.8)
LYMPHOCYTES NFR BLD: 13.1 % (ref 22–41)
MCH RBC QN AUTO: 28.2 PG (ref 27–33)
MCHC RBC AUTO-ENTMCNC: 31.5 G/DL (ref 32.2–35.5)
MCV RBC AUTO: 89.3 FL (ref 81.4–97.8)
MICRO URNS: NORMAL
MONOCYTES # BLD AUTO: 0.38 K/UL (ref 0–0.85)
MONOCYTES NFR BLD AUTO: 7.6 % (ref 0–13.4)
NEUTROPHILS # BLD AUTO: 3.92 K/UL (ref 1.82–7.42)
NEUTROPHILS NFR BLD: 77.9 % (ref 44–72)
NITRITE UR QL STRIP.AUTO: NEGATIVE
NRBC # BLD AUTO: 0 K/UL
NRBC BLD-RTO: 0 /100 WBC (ref 0–0.2)
PH UR STRIP.AUTO: 6.5 [PH] (ref 5–8)
PLATELET # BLD AUTO: 93 K/UL (ref 164–446)
PMV BLD AUTO: 11.9 FL (ref 9–12.9)
POTASSIUM SERPL-SCNC: 4.9 MMOL/L (ref 3.6–5.5)
PROT SERPL-MCNC: 6.6 G/DL (ref 6–8.2)
PROT UR QL STRIP: NEGATIVE MG/DL
RBC # BLD AUTO: 4.76 M/UL (ref 4.2–5.4)
RBC UR QL AUTO: NEGATIVE
SODIUM SERPL-SCNC: 139 MMOL/L (ref 135–145)
SP GR UR STRIP.AUTO: <=1.005
TACROLIMUS BLD-MCNC: 8.3 NG/ML (ref 5–20)
WBC # BLD AUTO: 5 K/UL (ref 4.8–10.8)

## 2024-01-02 PROCEDURE — 80197 ASSAY OF TACROLIMUS: CPT

## 2024-01-02 PROCEDURE — 36415 COLL VENOUS BLD VENIPUNCTURE: CPT

## 2024-01-02 PROCEDURE — 81003 URINALYSIS AUTO W/O SCOPE: CPT

## 2024-01-02 PROCEDURE — 87799 DETECT AGENT NOS DNA QUANT: CPT | Mod: 91

## 2024-01-02 PROCEDURE — 80053 COMPREHEN METABOLIC PANEL: CPT

## 2024-01-02 PROCEDURE — 85025 COMPLETE CBC W/AUTO DIFF WBC: CPT

## 2024-01-03 ENCOUNTER — TELEPHONE (OUTPATIENT)
Dept: CARDIOLOGY | Facility: MEDICAL CENTER | Age: 74
End: 2024-01-03
Payer: MEDICARE

## 2024-01-03 LAB
BK PLASMA INTERP, QNT NAAT NL11711: DETECTED
BK PLASMA IU/ML, QNT NAAT NL11709: 323 IU/ML
BK PLASMA LOG IU/ML, QNT NAAT NL11710: 2.51 LOG IU/ML
BK UR INTERP, QNT NAAT NL11708: DETECTED
BK UR IU/ML, QNT NAAT NL11706: ABNORMAL IU/ML
BK UR LOG IU/ML, QNT NAAT NL11707: 5.32 LOG IU/ML

## 2024-01-03 NOTE — LETTER
PROCEDURE/SURGERY CLEARANCE FORM      Encounter Date: 1/3/2024    Patient: Tere Gallegos  YOB: 1950    CARDIOLOGIST:  Damon Crawley M.D.    REFERRING DOCTOR:  No ref. provider found    The above patient is cleared to have the following procedure/surgery: Fistulagram or Angioplasty                                              PROCEDURE/SURGERY CLEARANCE FORM    Date: 1/3/2024   Patient Name: Tere Gallegos    Dear Surgeon or Proceduralist,      Thank you for your request for cardiac stratification of our mutual patient Tere Gallegos 1950. We have reviewed their [facility] records; and to the best of our understanding this patient has not had stenting, ablation, cardiothoracic surgery or hospitalization for cardiovascular reasons in the past 6 months.  Tere Gallegos has been seen within the past 18 months and is considered to have non-modifiable cardiac risk for this low-risk procedure/surgery. They may proceed from a cardiovascular standpoint and may hold their antiplatelet/anticoagulation as briefly as possible. Please have patient resume this medication when hemodynamically stable to do so.     Aspirin or Prasugrel   - hold 7 days prior to procedure/surgery, resume when hemodynamically stable      Clopidrogrel or Ticagrelor  - hold 7 days for all neurological procedures, hold 5 days prior to all other procedure/surgery,  resume when hemodynamically stable     Warfarin - hold 7 days for all neurological procedures, hold 5 days prior to all other procedure/surgery and coordinate with [facility] ([phone]) INR testing and dose management.      Pradaxa/Xarelto/Eliquis/Savesya - hold 1 day prior to procedure for low bleeding risk procedure, 2 days for high bleeding risk procedure, or consider holding 3 days or longer for patients with reduced kidney function (CrCl <30mL/min) or spinal/cranial surgeries/procedures.      If they have  a mechanical heart valve, please coordinate with [facility] ([phone]) the proper management of their anticoagulant in the periprocedural or perioperative period.      Some patients have higher risk for cardiovascular complications or holding medication. If our patient has had prior complications of holding antiplatelet or anticoagulants in the past and we have seen them after these events, we have addressed these concerns with the patient. They are at an unknown degree of increased risk for recurrent complication.  You may hold anticoagulation/antiplatelets for the procedure or surgery if the benefits of the procedure or surgery outweigh this nonmodifiable risk.      If Tere Gallegos 1950 has new symptoms of heart failure decompensation, unstable arrythmia, or angina please reach out and we will assess the patient.      If you have other patient-specific concerns, please feel free to reach out to the patient's cardiologist directly at [phone].     Thank you,       [facility]

## 2024-01-03 NOTE — TELEPHONE ENCOUNTER
Last OV: 12.13.23  Proposed Surgery: fistulagram or angioplasty   Surgery Date: 1.12.24  Requesting Office Name: [facility]  Fax Number: [phone]  Preference of Location (default is surgery center unless specified by Cardiologist or BETTE)  Prior Clearance Addressed: No      Anticoags/Antiplatelets: Apixaban   Anticoags/Antiplatelet managed by Cardiology? YES    Outstanding Cardiac Imaging : No  Stent, Cardiac Devices, or Catheterization: No  Ablation, TAVR/Valve (including open heart), Cardioversion: No  Recent Cardiac Hospitalization: No            When: N/A  History (cardiac history):   Past Medical History:   Diagnosis Date    Acquired hypothyroidism 05/04/2020    CAD (coronary artery disease)     Chronic diastolic heart failure (HCC) 05/04/2020    Coronary artery disease due to lipid rich plaque     2 Synergy NOEMI to 100% RCA stent placed    Dental disorder     partial dentures- uppers    Diabetes ([facility])     oral medication    ESRD (end stage renal disease) on dialysis ([facility]) 05/04/2020    Hemodialysis patient ([facility])     M, W, F    Hyperlipidemia     Hypertension     Kidney transplant candidate     Kidney transplant recipient 10/31/2022    Presence of drug-eluting stent in right coronary artery     QT prolongation 01/22/2020    RLS (restless legs syndrome) 08/05/2016    Transaminitis 12/22/2018             Surgical Clearance Letter Sent: YES   **Scan clearance request letter into [employer].**

## 2024-01-09 ENCOUNTER — DOCUMENTATION (OUTPATIENT)
Dept: PHARMACY | Facility: MEDICAL CENTER | Age: 74
End: 2024-01-09
Payer: MEDICARE

## 2024-01-09 NOTE — PROGRESS NOTES
Transfer Onboarding  Diagnosis: Kidney transplant recipient [Z94.0]     Drug & Non-Drug Allergies:   EMR Reviewed. No concerning drug or non-drug allergies.     Drug Therapy (name/formulation/dose/route of admin/freq): Tacrolimus 2mg (1mg cap x2) by mouth 2 times a day   EMR Reviewed         -Dose Appropriateness (Y/N): Y, FK levels monitored         -Renal or hepatic dose adjustments (Y/N): N         -Any comorbidities, PMH, precautions or contraindications exist that pose medication safety concerns (Y/N): N, T2DM and mixed HLD noted         -Any relevant lab(s) needed that affects the initial start date (Y/N): N  List Past Treatments: MMF, prednisone   EMR Med List reviewed. List DDI’s:   - Cat C: amlodipine and amiodarone may increase Tacro, FK monitored     Patient’s ability to self-administer medication:   No issues identified per EMR     List Goals of Therapy:  - To prevent graft rejection with therapy adherence   - To reduce cardiovascular related events by managing hypertension and hyperlipidemia  - To reduce infection risk   - To reduce risk for endocrine/metabolic abnormalities    Patient filled at [facility] 11/25/23, gap list. Now filling at [facility].

## 2024-01-10 ENCOUNTER — OFFICE VISIT (OUTPATIENT)
Dept: MEDICAL GROUP | Facility: MEDICAL CENTER | Age: 74
End: 2024-01-10
Payer: MEDICARE

## 2024-01-10 VITALS
WEIGHT: 134.25 LBS | OXYGEN SATURATION: 94 % | DIASTOLIC BLOOD PRESSURE: 60 MMHG | TEMPERATURE: 98 F | SYSTOLIC BLOOD PRESSURE: 160 MMHG | HEART RATE: 70 BPM | HEIGHT: 61 IN | BODY MASS INDEX: 25.34 KG/M2

## 2024-01-10 DIAGNOSIS — R07.9 CHRONIC CHEST PAIN: ICD-10-CM

## 2024-01-10 DIAGNOSIS — I15.0 RENOVASCULAR HYPERTENSION: ICD-10-CM

## 2024-01-10 DIAGNOSIS — R30.0 DYSURIA: ICD-10-CM

## 2024-01-10 DIAGNOSIS — G89.29 CHRONIC CHEST PAIN: ICD-10-CM

## 2024-01-10 LAB
APPEARANCE UR: CLEAR
BILIRUB UR STRIP-MCNC: NEGATIVE MG/DL
COLOR UR AUTO: YELLOW
GLUCOSE UR STRIP.AUTO-MCNC: NEGATIVE MG/DL
KETONES UR STRIP.AUTO-MCNC: NEGATIVE MG/DL
LEUKOCYTE ESTERASE UR QL STRIP.AUTO: NORMAL
NITRITE UR QL STRIP.AUTO: NEGATIVE
PH UR STRIP.AUTO: 7 [PH] (ref 5–8)
PROT UR QL STRIP: NORMAL MG/DL
RBC UR QL AUTO: NEGATIVE
SP GR UR STRIP.AUTO: 1.01
UROBILINOGEN UR STRIP-MCNC: NORMAL MG/DL

## 2024-01-10 PROCEDURE — 3078F DIAST BP <80 MM HG: CPT | Performed by: NURSE PRACTITIONER

## 2024-01-10 PROCEDURE — 99214 OFFICE O/P EST MOD 30 MIN: CPT | Performed by: NURSE PRACTITIONER

## 2024-01-10 PROCEDURE — 81002 URINALYSIS NONAUTO W/O SCOPE: CPT | Performed by: NURSE PRACTITIONER

## 2024-01-10 PROCEDURE — 3077F SYST BP >= 140 MM HG: CPT | Performed by: NURSE PRACTITIONER

## 2024-01-10 RX ORDER — AMLODIPINE BESYLATE 10 MG/1
10 TABLET ORAL DAILY
Qty: 30 TABLET | Refills: 1 | Status: SHIPPED | OUTPATIENT
Start: 2024-01-10 | End: 2024-01-26

## 2024-01-10 ASSESSMENT — FIBROSIS 4 INDEX: FIB4 SCORE: 3.18

## 2024-01-10 NOTE — PROGRESS NOTES
Subjective:     Tere Gallegos is a 73 y.o. female who presents with HTN.    HPI:   Seen in fu for HTN.  Pt is Maori speaking. Seen with son as  but he was unable to adequately explain all pt sx.  Then machine  was used.  She has noted elevated BP over the last several weeks. She brings in bp diary with all elevated SBP in 170's.  She is taking her meds approp with norvasc 5 mg, amiodarone and levothyroxine.   She is having chest pain at nite.  She was previously seen in 9/23 and hosp for afib and chest pain. She is now on amiodarone for the afib.  She reports tthat she has had the pain for 2-3 yrs.  She has been to the ER and hospital twice for the pain.  She is followed by cardiac.   She is having dysuria but only when she is standing up.  No abd tenderness except around txp kidney site.  She is followed by [facility] for her ESRD.      Patient Active Problem List    Diagnosis Date Noted    Immunosuppression due to drug therapy (HCC) 12/15/2023    Acquired circulating anticoagulants (HCC) 11/24/2023    Infection due to ESBL-producing Escherichia coli 09/25/2023    Metabolic acidosis 09/23/2023    Bradycardia 09/23/2023    Low bicarbonate level 09/05/2023    Itching 06/08/2023    Other hydronephrosis 05/17/2023    Long term current use of immunosuppressive drug 04/25/2023    Leg swelling 04/20/2023    Permanent atrial fibrillation (HCC) 04/01/2023    Multifocal pneumonia 03/31/2023    Stage 3a chronic kidney disease (HCC) 03/31/2023    Drug-induced constipation 03/09/2023    Chronic anticoagulation 12/27/2022    Type 2 diabetes mellitus (HCC) 12/19/2022    Long-term insulin use (HCC) 12/08/2022    Kidney transplant recipient 11/09/2022    Lung nodules 10/22/2022    GERD (gastroesophageal reflux disease) 10/19/2022    Normocytic anemia 08/25/2022    Secondary hypercoagulable state (HCC) 04/04/2022    Paroxysmal A-fib (HCC) 08/11/2021    Pancytopenia (HCC) 11/18/2020    Presence of  drug-eluting stent in right coronary artery     Chronic heart failure with preserved ejection fraction ([facility]) 05/04/2020    ESRD s/p kidney transplant 10/31/22 05/04/2020    Acquired hypothyroidism 05/04/2020    Dental disorder 05/04/2020    Coronary artery disease with angina pectoris with documented spasm ([facility])     Mixed hyperlipidemia 11/12/2019    Elevated troponin 12/22/2018    RLS (restless legs syndrome) 08/05/2016    Type 2 diabetes mellitus with diabetic nephropathy, with long-term current use of insulin ([facility]) 08/05/2016    Renovascular hypertension 09/17/2013       Current medicines (including changes today)  Current Outpatient Medications   Medication Sig Dispense Refill    amLODIPine (NORVASC) 10 MG Tab Take 1 Tablet by mouth every day. 30 Tablet 1    docusate sodium (COLACE) 100 MG Cap Take 1 Capsule by mouth 2 times a day as needed for Constipation (Constipation). 180 Capsule 3    sodium bicarbonate (SODIUM BICARBONATE) 650 MG Tab Take 1 Tablet by mouth 2 times a day. 180 Tablet 3    amiodarone (CORDARONE) 200 MG Tab Take 1 Tablet by mouth every day. 100 Tablet 3    tacrolimus (PROGRAF) 1 MG Cap Take 2 Capsules by mouth 2 times a day. 120 Capsule 0    metoprolol SR (TOPROL XL) 25 MG TABLET SR 24 HR Take 1 Tablet by mouth every day. 30 Tablet 0    ELIQUIS 5 MG Tab TAKE 1 TABLET BY MOUTH TWICE A  Tablet 1    mycophenolate (CELLCEPT) 250 MG Cap Take 1 Capsule by mouth 2 times a day. 20 Capsule 0    levothyroxine (SYNTHROID) 75 MCG Tab Take 1 Tablet by mouth every morning on an empty stomach. 90 Tablet 4    atorvastatin (LIPITOR) 20 MG Tab Take 1 Tablet by mouth every evening. 100 Tablet 3    insulin aspart (NOVOLOG FLEXPEN) 100 UNIT/ML injection PEN Inject 0-12 Units under the skin 4 Times a Day,Before Meals and at Bedtime. Unspecified sliding scale.      predniSONE (DELTASONE) 5 MG Tab Take 5 mg by mouth every day.       No current facility-administered medications for this visit.       No Known  "Allergies    ROS  Constitutional: Negative. Negative for fever, chills, weight loss, malaise/fatigue and diaphoresis.   HENT: Negative. Negative for hearing loss, ear pain, nosebleeds, congestion, sore throat, neck pain, tinnitus and ear discharge.   Respiratory: Negative. Negative for cough, hemoptysis, sputum production, shortness of breath, wheezing and stridor.   Cardiovascular: Negative. Negative for palpitations, orthopnea, claudication, leg swelling and PND.   Gastrointestinal: Denies nausea, vomiting, diarrhea, heartburn, melena or hematochezia. Chronic constipation.  Genitourinary: Denies dysuria, hematuria, urinary incontinence, frequency or urgency.        Objective:     BP (!) 160/60 (BP Location: Right arm, Patient Position: Sitting, BP Cuff Size: Adult)   Pulse 70   Temp 36.7 °C (98 °F) (Temporal)   Ht 1.56 m (5' 1.42\")   Wt 60.9 kg (134 lb 4 oz)   SpO2 94%  Body mass index is 25.02 kg/m².    Physical Exam:  Physical Exam   Vitals reviewed.  Constitutional: oriented to person, place, and time. appears well-developed and well-nourished. No distress.   Cardiovascular: Normal rate, regular rhythm, normal heart sounds and intact distal pulses.  Exam reveals no gallop and no friction rub.  No murmur heard.  No carotid bruits.   Pulmonary/Chest: Effort normal and breath sounds normal. No stridor. No respiratory distress. no wheezes or rales. exhibits no tenderness.   Musculoskeletal: Normal range of motion. exhibits no edema. gretel pedal pulses 2+.  Lymphadenopathy: no cervical or supraclavicular adenopathy.   Abd:  No CVAT,  Soft,  Bs noted in all quadrants.  No HSM.  No abdominal tenderness.  Neurological: alert and oriented to person, place, and time. exhibits normal muscle tone. Coordination normal.   Skin: Skin is warm and dry. no diaphoresis.   Psychiatric: normal mood and affect. behavior is normal.        Assessment and Plan:     The following treatment plan was discussed:    1. Renovascular " hypertension  amLODIPine (NORVASC) 10 MG Tab    SBP not controlled. increase norvasc to 10 mg daily. f/u 1 mo as sched w/PCP      2. Dysuria  POCT Urinalysis    UA wnl.  no UTI. continue f/u with PCP and [facility] as sched      3. Chronic chest pain      followed by cardiac. per pt present for 2-3 years.  she is on amiodarone for afib.            Followup: Return if symptoms worsen or fail to improve.

## 2024-01-22 RX ORDER — ATORVASTATIN CALCIUM 20 MG/1
20 TABLET, FILM COATED ORAL EVERY EVENING
Qty: 90 TABLET | Refills: 3 | Status: SHIPPED | OUTPATIENT
Start: 2024-01-22

## 2024-01-22 NOTE — TELEPHONE ENCOUNTER
Is the patient due for a refill? Yes    Was the patient seen the past year? Yes    Date of last office visit: 12/13/2023    Does the patient have an upcoming appointment?  Yes   If yes, When? 05/15/2024    Provider to refill:BE    Does the patients insurance require a 100 day supply?  No

## 2024-01-24 DIAGNOSIS — I15.0 RENOVASCULAR HYPERTENSION: ICD-10-CM

## 2024-01-26 RX ORDER — AMLODIPINE BESYLATE 10 MG/1
10 TABLET ORAL
Qty: 90 TABLET | Refills: 1 | Status: SHIPPED | OUTPATIENT
Start: 2024-01-26

## 2024-01-30 ENCOUNTER — OFFICE VISIT (OUTPATIENT)
Dept: MEDICAL GROUP | Facility: MEDICAL CENTER | Age: 74
End: 2024-01-30
Payer: MEDICARE

## 2024-01-30 VITALS
TEMPERATURE: 97.5 F | OXYGEN SATURATION: 97 % | BODY MASS INDEX: 25.3 KG/M2 | WEIGHT: 134 LBS | DIASTOLIC BLOOD PRESSURE: 52 MMHG | HEIGHT: 61 IN | SYSTOLIC BLOOD PRESSURE: 120 MMHG | HEART RATE: 71 BPM

## 2024-01-30 DIAGNOSIS — I48.91 ATRIAL FIBRILLATION WITH RAPID VENTRICULAR RESPONSE (HCC): ICD-10-CM

## 2024-01-30 DIAGNOSIS — I15.0 RENOVASCULAR HYPERTENSION: ICD-10-CM

## 2024-01-30 DIAGNOSIS — I48.0 PAROXYSMAL A-FIB (HCC): ICD-10-CM

## 2024-01-30 PROCEDURE — 3074F SYST BP LT 130 MM HG: CPT | Performed by: PHYSICIAN ASSISTANT

## 2024-01-30 PROCEDURE — 99214 OFFICE O/P EST MOD 30 MIN: CPT | Performed by: PHYSICIAN ASSISTANT

## 2024-01-30 PROCEDURE — 3078F DIAST BP <80 MM HG: CPT | Performed by: PHYSICIAN ASSISTANT

## 2024-01-30 RX ORDER — METOPROLOL SUCCINATE 25 MG/1
25 TABLET, EXTENDED RELEASE ORAL DAILY
Qty: 30 TABLET | Refills: 3 | Status: SHIPPED | OUTPATIENT
Start: 2024-01-30

## 2024-01-30 ASSESSMENT — FIBROSIS 4 INDEX: FIB4 SCORE: 3.18

## 2024-01-30 NOTE — PROGRESS NOTES
Subjective:   Tere Gallegos is a 73 y.o. female here today for     HPI: Patient is here to discuss:  Problem   Paroxysmal A-Fib (Hcc)    Patient has a history of this and sees cardiologist has been on amiodarone and metoprolol in the past.  She is not sure why she is not taking metoprolol     Renovascular Hypertension    Patient has a history of hypertension.  She is unclear as to which medicine she is supposed to take but does note she takes levothyroxine for the thyroid, amiodarone for history of atrial fibrillation and amlodipine 10 mg daily for hypertension.  Was having an episode of elevated blood pressure today 170s over 90s so she came in            Current medicines (including changes today)  Current Outpatient Medications   Medication Sig Dispense Refill    metoprolol SR (TOPROL XL) 25 MG TABLET SR 24 HR Take 1 Tablet by mouth every day. 30 Tablet 3    amLODIPine (NORVASC) 10 MG Tab TOME FERNANDO TABLETA TODOS LOS MCCORMICK 90 Tablet 1    atorvastatin (LIPITOR) 20 MG Tab TAKE 1 TABLET BY MOUTH EVERY DAY IN THE EVENING 90 Tablet 3    docusate sodium (COLACE) 100 MG Cap Take 1 Capsule by mouth 2 times a day as needed for Constipation (Constipation). 180 Capsule 3    sodium bicarbonate (SODIUM BICARBONATE) 650 MG Tab Take 1 Tablet by mouth 2 times a day. 180 Tablet 3    amiodarone (CORDARONE) 200 MG Tab Take 1 Tablet by mouth every day. 100 Tablet 3    tacrolimus (PROGRAF) 1 MG Cap Take 2 Capsules by mouth 2 times a day. 120 Capsule 0    ELIQUIS 5 MG Tab TAKE 1 TABLET BY MOUTH TWICE A  Tablet 1    mycophenolate (CELLCEPT) 250 MG Cap Take 1 Capsule by mouth 2 times a day. 20 Capsule 0    levothyroxine (SYNTHROID) 75 MCG Tab Take 1 Tablet by mouth every morning on an empty stomach. 90 Tablet 4    insulin aspart (NOVOLOG FLEXPEN) 100 UNIT/ML injection PEN Inject 0-12 Units under the skin 4 Times a Day,Before Meals and at Bedtime. Unspecified sliding scale.      predniSONE (DELTASONE) 5 MG Tab Take 5 mg  "by mouth every day.       No current facility-administered medications for this visit.     She  has a past medical history of Acquired hypothyroidism (05/04/2020), CAD (coronary artery disease), Chronic diastolic heart failure (HCC) (05/04/2020), Coronary artery disease due to lipid rich plaque, Dental disorder, Diabetes ([facility]), ESRD (end stage renal disease) on dialysis ([facility]) (05/04/2020), Hemodialysis patient ([facility]), Hyperlipidemia, Hypertension, Kidney transplant candidate, Kidney transplant recipient (10/31/2022), Presence of drug-eluting stent in right coronary artery, QT prolongation (01/22/2020), RLS (restless legs syndrome) (08/05/2016), and Transaminitis (12/22/2018).  Patient has no known allergies.     Social History and Family History were reviewed and updated.    ROS   No headaches, chest pain, no shortness of breath, abdominal pain, nausea, or vomiting.  All other systems were reviewed and are negative or noted as positive in the HPI.       Objective:     /52 (BP Location: Left arm, Patient Position: Sitting, BP Cuff Size: Adult)   Pulse 71   Temp 36.4 °C (97.5 °F) (Temporal)   Ht 1.549 m (5' 1\")   Wt 60.8 kg (134 lb)   SpO2 97%  Body mass index is 25.32 kg/m².     Physical Exam:  General: Patient appears well-nourished, well-hydrated, nontoxic  HEENT, normocephalic atraumatic, PERRLA, extraocular movements intact, nares are patent and clear  Neck: No visible masses, thyromegaly or abnormalities noted  Cardiovascular.  Sitting comfortably without visible signs of edema  Lungs: No cyanosis noted, nondyspneic  Skin: Well perfused without evidence of rash or lesions  Neurological: Cranial nerves II through XII intact, normal gait  Musculoskeletal: Normal range of motion, normal strength and no deficit noted     Clinical Course/Lab Analysis:        Assessment and Plan:   The following treatment plan was discussed.  Signs and symptoms for which to return were discussed with patient at length.  " Patient verbalized understanding.    Problem List Items Addressed This Visit       Renovascular hypertension     Chronic and unstable  I have reviewed multiple notes from multiple providers and it is unclear per patient what she should be on.  She has amlodipine 10 mg listed.  Metoprolol 25 mg is also listed and I like her to take this.  She is not taking it currently.  Did review nephrology's note.  Please see below as changes may be needed.  At this time her blood pressure is well-controlled         Relevant Medications    metoprolol SR (TOPROL XL) 25 MG TABLET SR 24 HR    Paroxysmal A-fib (HCC)     Chronic and unstable  Patient has a history and was supposed to be taking amiodarone which she has as well as atorvastatin which she ha.  she supposed be on metoprolol 25 mg daily but is not taking it.  I will write for this.  Continue Eliquis 5 mg daily         Relevant Medications    metoprolol SR (TOPROL XL) 25 MG TABLET SR 24 HR     Other Visit Diagnoses       Atrial fibrillation with rapid ventricular response (HCC)        Relevant Medications    metoprolol SR (TOPROL XL) 25 MG TABLET SR 24 HR             *Hypertension (per nephrology note December 2023 for review below)  - Uncontrolled in the office, but patient having feelings of lightheadedness at home.  She had previously been prescribed Lasix and losartan, unclear why she is off of these 2 medications right now.  She is currently only prescribed amlodipine and metoprolol.  Fortunately, she does not have lower extremity edema.  If hypertension persists in the future, and if potassium remains controlled, I would likely recommend stopping amlodipine and resuming losartan 100 mg daily.  However, patient presents alone today, and is confused about what medications she takes, so I hesitate to make too many changes to her medications today.    Followup: 3 months with myra.  Of note I am rewriting for her for her metoprolol 25 mg daily that she supposed to be  taking this if she has any side effects which I discussed she can stop    Please note that this dictation was created using voice recognition software. I have made every reasonable attempt to correct obvious errors, but I expect that there are errors of grammar and possibly content that I did not discover before finalizing the note.

## 2024-01-30 NOTE — ASSESSMENT & PLAN NOTE
Chronic and unstable  I have reviewed multiple notes from multiple providers and it is unclear per patient what she should be on.  She has amlodipine 10 mg listed.  Metoprolol 25 mg is also listed and I like her to take this.  She is not taking it currently.  Did review nephrology's note.  Please see below as changes may be needed.  At this time her blood pressure is well-controlled

## 2024-01-30 NOTE — ASSESSMENT & PLAN NOTE
Chronic and unstable  Patient has a history and was supposed to be taking amiodarone which she has as well as atorvastatin which she ha.  she supposed be on metoprolol 25 mg daily but is not taking it.  I will write for this.  Continue Eliquis 5 mg daily

## 2024-02-01 ENCOUNTER — HOSPITAL ENCOUNTER (OUTPATIENT)
Dept: LAB | Facility: MEDICAL CENTER | Age: 74
End: 2024-02-01
Attending: INTERNAL MEDICINE
Payer: MEDICARE

## 2024-02-01 LAB
ALBUMIN SERPL BCP-MCNC: 4.2 G/DL (ref 3.2–4.9)
ALBUMIN/GLOB SERPL: 1.6 G/DL
ALP SERPL-CCNC: 64 U/L (ref 30–99)
ALT SERPL-CCNC: 13 U/L (ref 2–50)
ANION GAP SERPL CALC-SCNC: 12 MMOL/L (ref 7–16)
APPEARANCE UR: CLEAR
AST SERPL-CCNC: 14 U/L (ref 12–45)
BASOPHILS # BLD AUTO: 0.4 % (ref 0–1.8)
BASOPHILS # BLD: 0.02 K/UL (ref 0–0.12)
BILIRUB SERPL-MCNC: 0.6 MG/DL (ref 0.1–1.5)
BILIRUB UR QL STRIP.AUTO: NEGATIVE
BUN SERPL-MCNC: 37 MG/DL (ref 8–22)
CALCIUM ALBUM COR SERPL-MCNC: 9.7 MG/DL (ref 8.5–10.5)
CALCIUM SERPL-MCNC: 9.9 MG/DL (ref 8.4–10.2)
CHLORIDE SERPL-SCNC: 104 MMOL/L (ref 96–112)
CO2 SERPL-SCNC: 23 MMOL/L (ref 20–33)
COLOR UR: YELLOW
CREAT SERPL-MCNC: 1.54 MG/DL (ref 0.5–1.4)
EOSINOPHIL # BLD AUTO: 0.03 K/UL (ref 0–0.51)
EOSINOPHIL NFR BLD: 0.7 % (ref 0–6.9)
ERYTHROCYTE [DISTWIDTH] IN BLOOD BY AUTOMATED COUNT: 44.6 FL (ref 35.9–50)
GFR SERPLBLD CREATININE-BSD FMLA CKD-EPI: 35 ML/MIN/1.73 M 2
GLOBULIN SER CALC-MCNC: 2.7 G/DL (ref 1.9–3.5)
GLUCOSE SERPL-MCNC: 112 MG/DL (ref 65–99)
GLUCOSE UR STRIP.AUTO-MCNC: NEGATIVE MG/DL
HCT VFR BLD AUTO: 41.5 % (ref 37–47)
HGB BLD-MCNC: 13.4 G/DL (ref 12–16)
IMM GRANULOCYTES # BLD AUTO: 0.01 K/UL (ref 0–0.11)
IMM GRANULOCYTES NFR BLD AUTO: 0.2 % (ref 0–0.9)
KETONES UR STRIP.AUTO-MCNC: NEGATIVE MG/DL
LEUKOCYTE ESTERASE UR QL STRIP.AUTO: NEGATIVE
LYMPHOCYTES # BLD AUTO: 0.81 K/UL (ref 1–4.8)
LYMPHOCYTES NFR BLD: 18.2 % (ref 22–41)
MCH RBC QN AUTO: 28.3 PG (ref 27–33)
MCHC RBC AUTO-ENTMCNC: 32.3 G/DL (ref 32.2–35.5)
MCV RBC AUTO: 87.7 FL (ref 81.4–97.8)
MICRO URNS: NORMAL
MONOCYTES # BLD AUTO: 0.4 K/UL (ref 0–0.85)
MONOCYTES NFR BLD AUTO: 9 % (ref 0–13.4)
NEUTROPHILS # BLD AUTO: 3.19 K/UL (ref 1.82–7.42)
NEUTROPHILS NFR BLD: 71.5 % (ref 44–72)
NITRITE UR QL STRIP.AUTO: NEGATIVE
NRBC # BLD AUTO: 0 K/UL
NRBC BLD-RTO: 0 /100 WBC (ref 0–0.2)
PH UR STRIP.AUTO: 6 [PH] (ref 5–8)
PLATELET # BLD AUTO: 116 K/UL (ref 164–446)
PMV BLD AUTO: 11.4 FL (ref 9–12.9)
POTASSIUM SERPL-SCNC: 4.3 MMOL/L (ref 3.6–5.5)
PROT SERPL-MCNC: 6.9 G/DL (ref 6–8.2)
PROT UR QL STRIP: NEGATIVE MG/DL
RBC # BLD AUTO: 4.73 M/UL (ref 4.2–5.4)
RBC UR QL AUTO: NEGATIVE
SODIUM SERPL-SCNC: 139 MMOL/L (ref 135–145)
SP GR UR STRIP.AUTO: 1.01
WBC # BLD AUTO: 4.5 K/UL (ref 4.8–10.8)

## 2024-02-01 PROCEDURE — 36415 COLL VENOUS BLD VENIPUNCTURE: CPT

## 2024-02-01 PROCEDURE — 80197 ASSAY OF TACROLIMUS: CPT

## 2024-02-01 PROCEDURE — 85025 COMPLETE CBC W/AUTO DIFF WBC: CPT

## 2024-02-01 PROCEDURE — 81003 URINALYSIS AUTO W/O SCOPE: CPT

## 2024-02-01 PROCEDURE — 87799 DETECT AGENT NOS DNA QUANT: CPT

## 2024-02-01 PROCEDURE — 80053 COMPREHEN METABOLIC PANEL: CPT

## 2024-02-02 LAB — TACROLIMUS BLD-MCNC: 7.4 NG/ML (ref 5–20)

## 2024-02-03 LAB
BK PLASMA INTERP, QNT NAAT NL11711: DETECTED
BK PLASMA IU/ML, QNT NAAT NL11709: 390 IU/ML
BK PLASMA LOG IU/ML, QNT NAAT NL11710: 2.59 LOG IU/ML

## 2024-02-04 LAB
BK UR INTERP, QNT NAAT NL11708: DETECTED
BK UR IU/ML, QNT NAAT NL11706: ABNORMAL IU/ML
BK UR LOG IU/ML, QNT NAAT NL11707: 5.55 LOG IU/ML

## 2024-02-23 DIAGNOSIS — E78.2 MIXED HYPERLIPIDEMIA: ICD-10-CM

## 2024-02-28 ENCOUNTER — OFFICE VISIT (OUTPATIENT)
Dept: MEDICAL GROUP | Facility: MEDICAL CENTER | Age: 74
End: 2024-02-28
Payer: MEDICARE

## 2024-02-28 ENCOUNTER — PATIENT OUTREACH (OUTPATIENT)
Dept: HEALTH INFORMATION MANAGEMENT | Facility: OTHER | Age: 74
End: 2024-02-28

## 2024-02-28 VITALS
HEIGHT: 60 IN | BODY MASS INDEX: 27.09 KG/M2 | DIASTOLIC BLOOD PRESSURE: 58 MMHG | HEART RATE: 59 BPM | TEMPERATURE: 97 F | OXYGEN SATURATION: 96 % | SYSTOLIC BLOOD PRESSURE: 152 MMHG | WEIGHT: 138 LBS

## 2024-02-28 DIAGNOSIS — H91.93 HEARING PROBLEM OF BOTH EARS: ICD-10-CM

## 2024-02-28 DIAGNOSIS — H93.19 TINNITUS, UNSPECIFIED LATERALITY: ICD-10-CM

## 2024-02-28 DIAGNOSIS — Z79.899 IMMUNOSUPPRESSION DUE TO DRUG THERAPY (HCC): ICD-10-CM

## 2024-02-28 DIAGNOSIS — D61.818 PANCYTOPENIA (HCC): ICD-10-CM

## 2024-02-28 DIAGNOSIS — Z79.4 TYPE 2 DIABETES MELLITUS WITH DIABETIC NEPHROPATHY, WITH LONG-TERM CURRENT USE OF INSULIN (HCC): ICD-10-CM

## 2024-02-28 DIAGNOSIS — R21 RASH: ICD-10-CM

## 2024-02-28 DIAGNOSIS — I50.32 CHRONIC HEART FAILURE WITH PRESERVED EJECTION FRACTION (HCC): ICD-10-CM

## 2024-02-28 DIAGNOSIS — D68.318 ACQUIRED CIRCULATING ANTICOAGULANTS (HCC): ICD-10-CM

## 2024-02-28 DIAGNOSIS — E11.42 DIABETIC POLYNEUROPATHY ASSOCIATED WITH TYPE 2 DIABETES MELLITUS (HCC): ICD-10-CM

## 2024-02-28 DIAGNOSIS — Z79.4 LONG-TERM INSULIN USE (HCC): ICD-10-CM

## 2024-02-28 DIAGNOSIS — N18.31 STAGE 3A CHRONIC KIDNEY DISEASE: ICD-10-CM

## 2024-02-28 DIAGNOSIS — E11.21 TYPE 2 DIABETES MELLITUS WITH DIABETIC NEPHROPATHY, WITH LONG-TERM CURRENT USE OF INSULIN (HCC): ICD-10-CM

## 2024-02-28 DIAGNOSIS — I15.0 RENOVASCULAR HYPERTENSION: ICD-10-CM

## 2024-02-28 DIAGNOSIS — D84.821 IMMUNOSUPPRESSION DUE TO DRUG THERAPY (HCC): ICD-10-CM

## 2024-02-28 DIAGNOSIS — D68.69 SECONDARY HYPERCOAGULABLE STATE (HCC): ICD-10-CM

## 2024-02-28 PROBLEM — B34.8 BK VIREMIA: Status: ACTIVE | Noted: 2023-08-08

## 2024-02-28 PROCEDURE — 3077F SYST BP >= 140 MM HG: CPT | Performed by: NURSE PRACTITIONER

## 2024-02-28 PROCEDURE — 99214 OFFICE O/P EST MOD 30 MIN: CPT | Performed by: NURSE PRACTITIONER

## 2024-02-28 PROCEDURE — 3078F DIAST BP <80 MM HG: CPT | Performed by: NURSE PRACTITIONER

## 2024-02-28 RX ORDER — TRIAMCINOLONE ACETONIDE 1 MG/G
CREAM TOPICAL
Qty: 45 G | Refills: 2 | Status: SHIPPED | OUTPATIENT
Start: 2024-02-28

## 2024-02-28 RX ORDER — LANCETS 30 GAUGE
EACH MISCELLANEOUS
Qty: 100 EACH | Refills: 3 | Status: SHIPPED | OUTPATIENT
Start: 2024-02-28

## 2024-02-28 RX ORDER — LOSARTAN POTASSIUM 50 MG/1
50 TABLET ORAL DAILY
Qty: 90 TABLET | Refills: 3 | Status: SHIPPED | OUTPATIENT
Start: 2024-02-28

## 2024-02-28 RX ORDER — BLOOD SUGAR DIAGNOSTIC
STRIP MISCELLANEOUS
Qty: 100 STRIP | Refills: 3 | Status: SHIPPED | OUTPATIENT
Start: 2024-02-28

## 2024-02-28 ASSESSMENT — FIBROSIS 4 INDEX: FIB4 SCORE: 2.48

## 2024-02-29 ENCOUNTER — HOSPITAL ENCOUNTER (OUTPATIENT)
Dept: LAB | Facility: MEDICAL CENTER | Age: 74
End: 2024-02-29
Attending: NURSE PRACTITIONER
Payer: MEDICARE

## 2024-02-29 ENCOUNTER — HOSPITAL ENCOUNTER (OUTPATIENT)
Dept: LAB | Facility: MEDICAL CENTER | Age: 74
End: 2024-02-29
Attending: INTERNAL MEDICINE
Payer: MEDICARE

## 2024-02-29 DIAGNOSIS — N18.31 STAGE 3A CHRONIC KIDNEY DISEASE: ICD-10-CM

## 2024-02-29 DIAGNOSIS — Z79.4 TYPE 2 DIABETES MELLITUS WITH DIABETIC NEPHROPATHY, WITH LONG-TERM CURRENT USE OF INSULIN (HCC): ICD-10-CM

## 2024-02-29 DIAGNOSIS — E11.21 TYPE 2 DIABETES MELLITUS WITH DIABETIC NEPHROPATHY, WITH LONG-TERM CURRENT USE OF INSULIN (HCC): ICD-10-CM

## 2024-02-29 LAB
ALBUMIN SERPL BCP-MCNC: 4.5 G/DL (ref 3.2–4.9)
ALBUMIN/GLOB SERPL: 1.6 G/DL
ALP SERPL-CCNC: 78 U/L (ref 30–99)
ALT SERPL-CCNC: 19 U/L (ref 2–50)
ANION GAP SERPL CALC-SCNC: 14 MMOL/L (ref 7–16)
APPEARANCE UR: CLEAR
AST SERPL-CCNC: 17 U/L (ref 12–45)
BACTERIA #/AREA URNS HPF: ABNORMAL /HPF
BASOPHILS # BLD AUTO: 0.5 % (ref 0–1.8)
BASOPHILS # BLD: 0.02 K/UL (ref 0–0.12)
BILIRUB SERPL-MCNC: 0.6 MG/DL (ref 0.1–1.5)
BILIRUB UR QL STRIP.AUTO: NEGATIVE
BUN SERPL-MCNC: 34 MG/DL (ref 8–22)
CALCIUM ALBUM COR SERPL-MCNC: 9.7 MG/DL (ref 8.5–10.5)
CALCIUM SERPL-MCNC: 10.1 MG/DL (ref 8.4–10.2)
CHLORIDE SERPL-SCNC: 104 MMOL/L (ref 96–112)
CO2 SERPL-SCNC: 20 MMOL/L (ref 20–33)
COLOR UR: YELLOW
CREAT SERPL-MCNC: 1.63 MG/DL (ref 0.5–1.4)
CREAT UR-MCNC: 119.99 MG/DL
EOSINOPHIL # BLD AUTO: 0.03 K/UL (ref 0–0.51)
EOSINOPHIL NFR BLD: 0.7 % (ref 0–6.9)
EPI CELLS #/AREA URNS HPF: ABNORMAL /HPF
ERYTHROCYTE [DISTWIDTH] IN BLOOD BY AUTOMATED COUNT: 43.8 FL (ref 35.9–50)
EST. AVERAGE GLUCOSE BLD GHB EST-MCNC: 160 MG/DL
GFR SERPLBLD CREATININE-BSD FMLA CKD-EPI: 33 ML/MIN/1.73 M 2
GLOBULIN SER CALC-MCNC: 2.8 G/DL (ref 1.9–3.5)
GLUCOSE SERPL-MCNC: 131 MG/DL (ref 65–99)
GLUCOSE UR STRIP.AUTO-MCNC: NEGATIVE MG/DL
HBA1C MFR BLD: 7.2 % (ref 4–5.6)
HCT VFR BLD AUTO: 41.8 % (ref 37–47)
HGB BLD-MCNC: 13.8 G/DL (ref 12–16)
IMM GRANULOCYTES # BLD AUTO: 0.02 K/UL (ref 0–0.11)
IMM GRANULOCYTES NFR BLD AUTO: 0.5 % (ref 0–0.9)
KETONES UR STRIP.AUTO-MCNC: NEGATIVE MG/DL
LEUKOCYTE ESTERASE UR QL STRIP.AUTO: ABNORMAL
LYMPHOCYTES # BLD AUTO: 0.68 K/UL (ref 1–4.8)
LYMPHOCYTES NFR BLD: 16.4 % (ref 22–41)
MCH RBC QN AUTO: 29.1 PG (ref 27–33)
MCHC RBC AUTO-ENTMCNC: 33 G/DL (ref 32.2–35.5)
MCV RBC AUTO: 88 FL (ref 81.4–97.8)
MICRO URNS: ABNORMAL
MICROALBUMIN UR-MCNC: 3 MG/DL
MICROALBUMIN/CREAT UR: 25 MG/G (ref 0–30)
MONOCYTES # BLD AUTO: 0.37 K/UL (ref 0–0.85)
MONOCYTES NFR BLD AUTO: 8.9 % (ref 0–13.4)
MUCOUS THREADS #/AREA URNS HPF: ABNORMAL /HPF
NEUTROPHILS # BLD AUTO: 3.02 K/UL (ref 1.82–7.42)
NEUTROPHILS NFR BLD: 73 % (ref 44–72)
NITRITE UR QL STRIP.AUTO: NEGATIVE
NRBC # BLD AUTO: 0 K/UL
NRBC BLD-RTO: 0 /100 WBC (ref 0–0.2)
PH UR STRIP.AUTO: 6 [PH] (ref 5–8)
PLATELET # BLD AUTO: 113 K/UL (ref 164–446)
PMV BLD AUTO: 11.9 FL (ref 9–12.9)
POTASSIUM SERPL-SCNC: 4.5 MMOL/L (ref 3.6–5.5)
PROT SERPL-MCNC: 7.3 G/DL (ref 6–8.2)
PROT UR QL STRIP: NEGATIVE MG/DL
RBC # BLD AUTO: 4.75 M/UL (ref 4.2–5.4)
RBC # URNS HPF: ABNORMAL /HPF
RBC UR QL AUTO: NEGATIVE
SODIUM SERPL-SCNC: 138 MMOL/L (ref 135–145)
SP GR UR STRIP.AUTO: 1.01
WBC # BLD AUTO: 4.1 K/UL (ref 4.8–10.8)
WBC #/AREA URNS HPF: ABNORMAL /HPF

## 2024-02-29 PROCEDURE — 87086 URINE CULTURE/COLONY COUNT: CPT

## 2024-02-29 PROCEDURE — 85025 COMPLETE CBC W/AUTO DIFF WBC: CPT

## 2024-02-29 PROCEDURE — 82570 ASSAY OF URINE CREATININE: CPT

## 2024-02-29 PROCEDURE — 80053 COMPREHEN METABOLIC PANEL: CPT

## 2024-02-29 PROCEDURE — 87186 SC STD MICRODIL/AGAR DIL: CPT

## 2024-02-29 PROCEDURE — 80197 ASSAY OF TACROLIMUS: CPT

## 2024-02-29 PROCEDURE — 36415 COLL VENOUS BLD VENIPUNCTURE: CPT | Mod: GA

## 2024-02-29 PROCEDURE — 87077 CULTURE AEROBIC IDENTIFY: CPT

## 2024-02-29 PROCEDURE — 83036 HEMOGLOBIN GLYCOSYLATED A1C: CPT | Mod: GA

## 2024-02-29 PROCEDURE — 81001 URINALYSIS AUTO W/SCOPE: CPT

## 2024-02-29 PROCEDURE — 82043 UR ALBUMIN QUANTITATIVE: CPT

## 2024-02-29 PROCEDURE — 87799 DETECT AGENT NOS DNA QUANT: CPT | Mod: 91

## 2024-02-29 NOTE — PROGRESS NOTES
Subjective:     History of Present Illness  The patient presents for lab follow-up, diabetes and multiple concerns. She is accompanied by her son today and we are using  services.    She wants to do different protein shakes from [facility]. They were trying to give her Glucerna. She was given vanilla Glucerna, but she did not like the flavor. She did not ask [facility] if they have different flavors of the Glucerna. She likes strawberry. She does not like chocolate.     Her hands feel numb. She does not feel any strength. Her feet get numb. This started about 1 month ago. Her feet have been getting swollen.    Her blood sugar has been getting really high. Sometimes it is 300, sometimes it is 250, 260, 270. It rises all of a sudden. Since she got the transplant, it has been as high as 400. She takes her insulin up to 4 times a day because her sugar gets really high. She has an appointment with Dendocrinology 03/2024.    She does not think her blood pressure medications are working anymore. She takes 2 at night, but it is still high. She takes metoprolol 25 mg daily. She was on losartan, but it was stopped after a hospital discharge 09/2023 for an unknown reason.    She hears a ringing in her ear. This has been going on for years. It is getting worse. She can not hear very well out of her ear. She has not seen an ear doctor.    She puts a steroid cream all over her body when she gets itchiness. She only uses it at night when she is not able to sleep. Requesting refill of cream      Current medicines (including changes today)  Current Outpatient Medications   Medication Sig Dispense Refill    glucose blood (ACCU-CHEK GUIDE) strip USE ONE COVERED STRIP TO TEST BLOOD SUGAR THREE TIMES DAILY. 100 Strip 3    Lancets Use one covered lancet to test blood sugar three times daily. 100 Each 3    triamcinolone acetonide (KENALOG) 0.1 % Cream Aplique bernardo cantidad del tamano de un guisante sobre la piel bernardo vez al fernanda  janna sea necesario para la picazon o el sarpullido. 45 g 2    losartan (COZAAR) 50 MG Tab Take 1 Tablet by mouth every day. 90 Tablet 3    metoprolol SR (TOPROL XL) 25 MG TABLET SR 24 HR Take 1 Tablet by mouth every day. 30 Tablet 3    amLODIPine (NORVASC) 10 MG Tab TOME FERNANDO TABLETA TODOS LOS MCCORMICK 90 Tablet 1    atorvastatin (LIPITOR) 20 MG Tab TAKE 1 TABLET BY MOUTH EVERY DAY IN THE EVENING 90 Tablet 3    docusate sodium (COLACE) 100 MG Cap Take 1 Capsule by mouth 2 times a day as needed for Constipation (Constipation). 180 Capsule 3    sodium bicarbonate (SODIUM BICARBONATE) 650 MG Tab Take 1 Tablet by mouth 2 times a day. 180 Tablet 3    amiodarone (CORDARONE) 200 MG Tab Take 1 Tablet by mouth every day. 100 Tablet 3    tacrolimus (PROGRAF) 1 MG Cap Take 2 Capsules by mouth 2 times a day. 120 Capsule 0    ELIQUIS 5 MG Tab TAKE 1 TABLET BY MOUTH TWICE A  Tablet 1    mycophenolate (CELLCEPT) 250 MG Cap Take 1 Capsule by mouth 2 times a day. 20 Capsule 0    levothyroxine (SYNTHROID) 75 MCG Tab Take 1 Tablet by mouth every morning on an empty stomach. 90 Tablet 4    insulin aspart (NOVOLOG FLEXPEN) 100 UNIT/ML injection PEN Inject 0-12 Units under the skin 4 Times a Day,Before Meals and at Bedtime. Unspecified sliding scale.      predniSONE (DELTASONE) 5 MG Tab Take 5 mg by mouth every day.       No current facility-administered medications for this visit.     She  has a past medical history of Acquired hypothyroidism (05/04/2020), CAD (coronary artery disease), Chronic diastolic heart failure (HCC) (05/04/2020), Coronary artery disease due to lipid rich plaque, Dental disorder, Diabetes ([facility]), ESRD (end stage renal disease) on dialysis ([facility]) (05/04/2020), Hemodialysis patient ([facility]), Hyperlipidemia, Hypertension, Kidney transplant candidate, Kidney transplant recipient (10/31/2022), Presence of drug-eluting stent in right coronary artery, QT prolongation (01/22/2020), RLS (restless legs syndrome)  (08/05/2016), and Transaminitis (12/22/2018).    ROS   No chest pain, no shortness of breath, no abdominal pain  Positive ROS as per HPI.  All other systems reviewed and are negative.     Objective:     BP (!) 152/58   Pulse (!) 59   Temp 36.1 °C (97 °F) (Temporal)   Ht 1.524 m (5')   Wt 62.6 kg (138 lb)   SpO2 96%  Body mass index is 26.95 kg/m².   Physical Exam    Constitutional: Alert, no distress.  Skin: Warm, dry, good turgor, no rashes in visible areas.  Eye: Equal, round and reactive, conjunctiva clear, lids normal.  ENMT: Lips without lesions, good dentition  Respiratory: Unlabored respiratory effort  Psych: Alert and oriented x3, normal affect and mood.      Results  Laboratory Studies  Labs from 11/2023 were reviewed with the patient.        Assessment and Plan:   The following treatment plan was discussed    Assessment & Plan  The patient is here for lab follow-up, diabetes and multiple concerns.    1. Type 2 diabetes mellitus with diabetic nephropathy, with long-term current use of insulin (HCC)  Unstable  Due for A1C tomorrow, ordered, she will go to lab to have this done to see if her glucose is worsening which may be contributing to her numbness in hands and feet  Refilled test strips  Continue insulin  May need to restart other diabetes medications  - glucose blood (ACCU-CHEK GUIDE) strip; USE ONE COVERED STRIP TO TEST BLOOD SUGAR THREE TIMES DAILY.  Dispense: 100 Strip; Refill: 3  - Lancets; Use one covered lancet to test blood sugar three times daily.  Dispense: 100 Each; Refill: 3  - HEMOGLOBIN A1C; Future  - MICROALBUMIN CREAT RATIO URINE; Future    2. Diabetic polyneuropathy associated with type 2 diabetes mellitus (HCC)  Unstable  Glucose worse per patient  Check A1C tomorrow    3. Long-term insulin use (HCC)  Continue insulin per endocrinology    4. Renovascular hypertension  Unstable  Continue amlodipine 10 mg daily  Restart losartan at 50 mg daily, may need to increase back to 100 mg  but will hold off for now  - losartan (COZAAR) 50 MG Tab; Take 1 Tablet by mouth every day.  Dispense: 90 Tablet; Refill: 3    5. Stage 3a chronic kidney disease (HCC)  Unstable, GRF worse on recent labs  Due for urine microalbumin  Continue follow up with nephrology  Losartan restarted for better BP control  May need to add additional DM agent  - MICROALBUMIN CREAT RATIO URINE; Future    6. Tinnitus, unspecified laterality  Unstable  Ear exam unremarkable, follow up with audiology  - Referral to Audiology    7. Rash  Stable, no rash today  Advised her to use a very small amount only on spots that are itching, she was applying to large areas of her body. Refilled  - triamcinolone acetonide (KENALOG) 0.1 % Cream; Aplique bernardo cantidad del tamano de un guisante sobre la piel bernardo vez al fernanda janna sea necesario para la picazon o el sarpullido.  Dispense: 45 g; Refill: 2    8. Hearing problem of both ears  Unstable, follow up with audiology  - Referral to Audiology    9. Immunosuppression due to drug therapy (HCC)  Stable on medications, continue follow up with transplant team    10. Pancytopenia (HCC)  Stable    11. Secondary hypercoagulable state (HCC)  Stable    12. Acquired circulating anticoagulants (HCC)  Stable    13. Chronic heart failure with preserved ejection fraction (HCC)  Stable on medications per cardiology, continue follow up with them.       Follow-up  The patient will follow up in 2 weeks for HTN, DM      The MA is performing the above orders under the direction of Dr. Priest    Please note that this dictation was created using voice recognition software. I have made every reasonable attempt to correct obvious errors, but I expect that there are errors of grammar and possibly content that I did not discover before finalizing the note.      Attestation      Verbal consent was acquired by the patient to use [email or web address] ambient listening note generation during this visit Yes

## 2024-03-01 LAB
BK PLASMA INTERP, QNT NAAT NL11711: DETECTED
BK PLASMA IU/ML, QNT NAAT NL11709: 71 IU/ML
BK PLASMA LOG IU/ML, QNT NAAT NL11710: 1.85 LOG IU/ML
BK UR INTERP, QNT NAAT NL11708: DETECTED
BK UR IU/ML, QNT NAAT NL11706: ABNORMAL IU/ML
BK UR LOG IU/ML, QNT NAAT NL11707: 5.99 LOG IU/ML
TACROLIMUS BLD-MCNC: 5.4 NG/ML (ref 5–20)

## 2024-03-06 ENCOUNTER — ANTICOAGULATION VISIT (OUTPATIENT)
Dept: VASCULAR LAB | Facility: MEDICAL CENTER | Age: 74
End: 2024-03-06
Attending: INTERNAL MEDICINE
Payer: MEDICARE

## 2024-03-06 VITALS — HEART RATE: 53 BPM | SYSTOLIC BLOOD PRESSURE: 136 MMHG | DIASTOLIC BLOOD PRESSURE: 68 MMHG

## 2024-03-06 DIAGNOSIS — I48.0 PAROXYSMAL A-FIB (HCC): ICD-10-CM

## 2024-03-06 DIAGNOSIS — D68.69 SECONDARY HYPERCOAGULABLE STATE (HCC): ICD-10-CM

## 2024-03-06 PROCEDURE — 99211 OFF/OP EST MAY X REQ PHY/QHP: CPT

## 2024-03-06 NOTE — PROGRESS NOTES
Target end date: Indefinite  Indication: AF  Drug: Eliquis 5 mg bid    CHADsVASC = 5  HAS-BLED = 2-3    Utilized translation services for this encounter.  Language Line - [phone]  [employer] ID - [id number]   Sima, ID # [id number]    Health Status Since Last Assessment  Pt denies any new relevant medical problems, ED visits or hospitalizations  Pt denies any embolic events (stroke/tia/systemic embolism)    Adherence with DOAC Therapy  Pt has not missed doses in the average week.    Bleeding Risk Assessment  Pt denies epistaxis  Pt denies any hematuria  Pt denies any excessive or unusual bleeding/hematomas.  Pt denies any GI bleeds or hematemesis.  Pt denies any concerning daily headache or subdural hematoma symptoms.    Latest Hemoglobin: WNL  Hemoglobin   Date Value Ref Range Status   02/29/2024 13.8 12.0 - 16.0 g/dL Final     Latest Platelets:  low, but will CTM  Platelet Count   Date Value Ref Range Status   02/29/2024 113 (L) 164 - 446 K/uL Final     Comment:     Specimen integrity confirmed. Results confirmed by repeat testing.       Pt denies  ETOH overuse     Creatinine Clearance/Renal Function  Latest SCR<1.5, age<80, wt<60kg - renal dose adjustment not indicated    Hepatic function  Latest LFTs: WNL  Pt denies any history of liver dysfunction        Drug Interactions  ASA/other antiplatelets: None  NSAID: None  Other drug interactions: None  Verified no anticonvulsant or azole therapy, education provided for future use.     Examination  Blood Pressure   Vitals:    03/06/24 1113   BP: 136/68   Pulse: (!) 53     Symptomatic hypotension: Denies   Significant gait impairment/imbalance/high risk for falls? no    Final Assessment and Recommendations:  Patient appears stable from the anticoagulation standpoint.    Benefits of continued DOAC therapy outweigh risks for this patient  Recommend pt continue with current DOAC therapy: Eliquis 5 mg bid    DOAC is affordable     Other Actions: CMP/CBC hemogram  ordered prior to next visit    Follow up:  Will follow up with patient 3 month(s).     Dominique ViverosD

## 2024-03-07 DIAGNOSIS — E03.9 HYPOTHYROIDISM, ACQUIRED: ICD-10-CM

## 2024-03-07 RX ORDER — LEVOTHYROXINE SODIUM 0.07 MG/1
75 TABLET ORAL
Qty: 90 TABLET | Refills: 4 | Status: SHIPPED | OUTPATIENT
Start: 2024-03-07

## 2024-03-11 ENCOUNTER — PATIENT OUTREACH (OUTPATIENT)
Dept: HEALTH INFORMATION MANAGEMENT | Facility: OTHER | Age: 74
End: 2024-03-11
Payer: MEDICARE

## 2024-03-11 DIAGNOSIS — E11.9 TYPE 2 DIABETES MELLITUS WITHOUT COMPLICATION, WITHOUT LONG-TERM CURRENT USE OF INSULIN (HCC): ICD-10-CM

## 2024-03-11 DIAGNOSIS — I48.0 PAROXYSMAL A-FIB (HCC): ICD-10-CM

## 2024-03-11 DIAGNOSIS — N18.31 STAGE 3A CHRONIC KIDNEY DISEASE: ICD-10-CM

## 2024-03-11 NOTE — PROGRESS NOTES
PCM program enrollment--did not pursue any further due to changes in Medicare DCE program at [facility]. Will follow up with patient in the future should Medicare contracts change to allow enrollment.

## 2024-03-13 ENCOUNTER — OFFICE VISIT (OUTPATIENT)
Dept: MEDICAL GROUP | Facility: MEDICAL CENTER | Age: 74
End: 2024-03-13
Payer: MEDICARE

## 2024-03-13 VITALS
SYSTOLIC BLOOD PRESSURE: 130 MMHG | BODY MASS INDEX: 27.09 KG/M2 | HEIGHT: 60 IN | TEMPERATURE: 97 F | HEART RATE: 54 BPM | OXYGEN SATURATION: 95 % | DIASTOLIC BLOOD PRESSURE: 70 MMHG | WEIGHT: 138 LBS

## 2024-03-13 DIAGNOSIS — E11.22 TYPE 2 DIABETES MELLITUS WITH STAGE 3B CHRONIC KIDNEY DISEASE, WITH LONG-TERM CURRENT USE OF INSULIN (HCC): ICD-10-CM

## 2024-03-13 DIAGNOSIS — Z79.4 TYPE 2 DIABETES MELLITUS WITH STAGE 3B CHRONIC KIDNEY DISEASE, WITH LONG-TERM CURRENT USE OF INSULIN (HCC): ICD-10-CM

## 2024-03-13 DIAGNOSIS — I15.0 RENOVASCULAR HYPERTENSION: ICD-10-CM

## 2024-03-13 DIAGNOSIS — N18.32 TYPE 2 DIABETES MELLITUS WITH STAGE 3B CHRONIC KIDNEY DISEASE, WITH LONG-TERM CURRENT USE OF INSULIN (HCC): ICD-10-CM

## 2024-03-13 DIAGNOSIS — H91.90 HEARING DISORDER, UNSPECIFIED LATERALITY: ICD-10-CM

## 2024-03-13 DIAGNOSIS — D61.9 APLASTIC ANEMIA, UNSPECIFIED (HCC): ICD-10-CM

## 2024-03-13 DIAGNOSIS — G63 POLYNEUROPATHY IN DISEASES CLASSIFIED ELSEWHERE (HCC): ICD-10-CM

## 2024-03-13 DIAGNOSIS — I25.111 CORONARY ARTERY DISEASE INVOLVING NATIVE CORONARY ARTERY OF NATIVE HEART WITH ANGINA PECTORIS WITH DOCUMENTED SPASM (HCC): ICD-10-CM

## 2024-03-13 DIAGNOSIS — H93.19 TINNITUS, UNSPECIFIED LATERALITY: ICD-10-CM

## 2024-03-13 PROBLEM — I13.2 HYPERTENSIVE HEART AND CHRONIC KIDNEY DISEASE WITH HEART FAILURE AND WITH STAGE 5 CHRONIC KIDNEY DISEASE, OR END STAGE RENAL DISEASE (HCC): Status: ACTIVE | Noted: 2024-03-13

## 2024-03-13 PROBLEM — E11.9 TYPE 2 DIABETES MELLITUS (HCC): Status: RESOLVED | Noted: 2022-12-19 | Resolved: 2024-03-13

## 2024-03-13 PROCEDURE — 3078F DIAST BP <80 MM HG: CPT | Performed by: NURSE PRACTITIONER

## 2024-03-13 PROCEDURE — 99214 OFFICE O/P EST MOD 30 MIN: CPT | Performed by: NURSE PRACTITIONER

## 2024-03-13 PROCEDURE — 3075F SYST BP GE 130 - 139MM HG: CPT | Performed by: NURSE PRACTITIONER

## 2024-03-13 ASSESSMENT — FIBROSIS 4 INDEX: FIB4 SCORE: 2.55

## 2024-03-13 NOTE — PROGRESS NOTES
Subjective:     History of Present Illness  The patient presents for lab review, diabetes and HTN follow up. She is accompanied by her son, utilizing translation services    She has an appointment with the endocrinology nurse practitioner on 03/20/2024. A1C is significantly worse since Nov 2023, now 7.2 up from 6.3. She is currently only taking mealtime insulin.     She has restarted the blood pressure medication that was sent in last time.    She has not received a call from the ear doctor for the ringing in her ears.    She feels her head is heavy every time she stands up. Her hand gets numb when she stands up or when she is laying down. She is able to lift her arm up normally. It does not feel weaker. She denies any numbness in her left leg. She has not had a fall, but she feels like she is going to fall down to that side and loses her balance. She denies any numbness or tingling in her face. She is taking Eliquis as prescribed. Has been having issues with left sided tinnitus.       Current medicines (including changes today)  Current Outpatient Medications   Medication Sig Dispense Refill    levothyroxine (SYNTHROID) 75 MCG Tab TAKE 1 TABLET BY MOUTH EVERY DAY IN THE MORNING ON AN EMPTY STOMACH 90 Tablet 4    apixaban (ELIQUIS) 5mg Tab Take 1 Tablet by mouth 2 times a day. 180 Tablet 1    glucose blood (ACCU-CHEK GUIDE) strip USE ONE COVERED STRIP TO TEST BLOOD SUGAR THREE TIMES DAILY. 100 Strip 3    Lancets Use one covered lancet to test blood sugar three times daily. 100 Each 3    triamcinolone acetonide (KENALOG) 0.1 % Cream Aplique fernando cantidad del tamano de un guisante sobre la piel fernando vez al fernanda janna sea necesario para la picazon o el sarpullido. 45 g 2    losartan (COZAAR) 50 MG Tab Take 1 Tablet by mouth every day. 90 Tablet 3    metoprolol SR (TOPROL XL) 25 MG TABLET SR 24 HR Take 1 Tablet by mouth every day. 30 Tablet 3    amLODIPine (NORVASC) 10 MG Tab TOME FERNANDO TABLETA TODOS LOS MCCORMICK 90 Tablet 1     atorvastatin (LIPITOR) 20 MG Tab TAKE 1 TABLET BY MOUTH EVERY DAY IN THE EVENING 90 Tablet 3    docusate sodium (COLACE) 100 MG Cap Take 1 Capsule by mouth 2 times a day as needed for Constipation (Constipation). 180 Capsule 3    sodium bicarbonate (SODIUM BICARBONATE) 650 MG Tab Take 1 Tablet by mouth 2 times a day. 180 Tablet 3    amiodarone (CORDARONE) 200 MG Tab Take 1 Tablet by mouth every day. 100 Tablet 3    tacrolimus (PROGRAF) 1 MG Cap Take 2 Capsules by mouth 2 times a day. 120 Capsule 0    mycophenolate (CELLCEPT) 250 MG Cap Take 1 Capsule by mouth 2 times a day. 20 Capsule 0    insulin aspart (NOVOLOG FLEXPEN) 100 UNIT/ML injection PEN Inject 0-12 Units under the skin 4 Times a Day,Before Meals and at Bedtime. Unspecified sliding scale.      predniSONE (DELTASONE) 5 MG Tab Take 5 mg by mouth every day.       No current facility-administered medications for this visit.     She  has a past medical history of Acquired hypothyroidism (05/04/2020), CAD (coronary artery disease), Chronic diastolic heart failure (HCC) (05/04/2020), Coronary artery disease due to lipid rich plaque, Dental disorder, Diabetes ([facility]), ESRD (end stage renal disease) on dialysis ([facility]) (05/04/2020), Hemodialysis patient ([facility]), Hyperlipidemia, Hypertension, Kidney transplant candidate, Kidney transplant recipient (10/31/2022), Presence of drug-eluting stent in right coronary artery, QT prolongation (01/22/2020), RLS (restless legs syndrome) (08/05/2016), and Transaminitis (12/22/2018).    ROS   No chest pain, no shortness of breath, no abdominal pain  Positive ROS as per HPI.  All other systems reviewed and are negative.     Objective:     /70   Pulse (!) 54   Temp 36.1 °C (97 °F) (Temporal)   Ht 1.524 m (5')   Wt 62.6 kg (138 lb)   SpO2 95%  Body mass index is 26.95 kg/m².   Physical Exam    Constitutional: Alert, no distress.  Skin: Warm, dry, good turgor, no rashes in visible areas.  Eye: Equal, round and reactive,  conjunctiva clear, lids normal.  ENMT: Lips without lesions, good dentition  Respiratory: Unlabored respiratory effort  Psych: Alert and oriented x3, normal affect and mood.      Results  Laboratory Studies  Labs were reviewed with the patient.        Assessment and Plan:   The following treatment plan was discussed    Assessment & Plan  1. Diabetes.      2. Hypertension.  Her blood pressure is looking a lot better. She will continue with her current medication regimen.    3. Tinnitus.  There are no signs of stroke. It could be related to the ringing in her ears. I will refer her to the ear doctor. She will let me know or go to the hospital if her symptoms worsen.    4. Head heaviness.  This could be related to the ringing in her ears. She is on a blood thinner, so she is at low risk for a stroke. If her symptoms worsen, she will let me know or go to the hospital.    Follow-up  The patient will follow up as needed.    1. Renovascular hypertension  Stable, significantly improved after restarting losartan 50 mg daily. Continue with amlodipine 10 mg daily, metoprolol 50 mg daily    2. Type 2 diabetes mellitus with stage 3b chronic kidney disease, with long-term current use of insulin (HCC)  Unstable, worsening  Her A1c has increased from 6.3 to 7.2. Advised her to talk to her endocrinologist about starting additional medicine for her diabetes, metformin or Jardiance would be reasonable options.     3. Tinnitus, unspecified laterality  Unstable  Prior referral denied due to her medicaid insurance, new referral placed  - Referral to Audiology    4. Hearing disorder, unspecified laterality  - Referral to Audiology    5. Polyneuropathy in diseases classified elsewhere ([facility])  Stable, continue to work on glucose control    6. Coronary artery disease involving native coronary artery of native heart with angina pectoris with documented spasm ([facility])  Stable, continue medication and follow up per cardiology    8. Aplastic anemia,  unspecified (HCC)  Stable, continue to monitor, no anemia currently    Follow up 3 months      The MA is performing the above orders under the direction of Dr. Priest    Please note that this dictation was created using voice recognition software. I have made every reasonable attempt to correct obvious errors, but I expect that there are errors of grammar and possibly content that I did not discover before finalizing the note.      Attestation      Verbal consent was acquired by the patient to use [employer] ambient listening note generation during this visit Yes

## 2024-03-20 ENCOUNTER — OFFICE VISIT (OUTPATIENT)
Dept: ENDOCRINOLOGY | Facility: MEDICAL CENTER | Age: 74
End: 2024-03-20
Payer: MEDICARE

## 2024-03-20 VITALS
DIASTOLIC BLOOD PRESSURE: 68 MMHG | OXYGEN SATURATION: 99 % | SYSTOLIC BLOOD PRESSURE: 124 MMHG | WEIGHT: 135.5 LBS | RESPIRATION RATE: 16 BRPM | HEART RATE: 60 BPM | BODY MASS INDEX: 26.6 KG/M2 | HEIGHT: 60 IN

## 2024-03-20 DIAGNOSIS — E55.9 VITAMIN D DEFICIENCY: ICD-10-CM

## 2024-03-20 DIAGNOSIS — E03.9 HYPOTHYROIDISM, ACQUIRED: ICD-10-CM

## 2024-03-20 DIAGNOSIS — E78.5 DYSLIPIDEMIA: ICD-10-CM

## 2024-03-20 DIAGNOSIS — I10 ESSENTIAL HYPERTENSION: ICD-10-CM

## 2024-03-20 DIAGNOSIS — I25.10 CARDIOVASCULAR DISEASE: ICD-10-CM

## 2024-03-20 DIAGNOSIS — Z94.0 KIDNEY TRANSPLANT STATUS: ICD-10-CM

## 2024-03-20 DIAGNOSIS — G62.9 POLYNEUROPATHY: ICD-10-CM

## 2024-03-20 DIAGNOSIS — E11.69 TYPE 2 DIABETES MELLITUS WITH OTHER SPECIFIED COMPLICATION, WITH LONG-TERM CURRENT USE OF INSULIN (HCC): ICD-10-CM

## 2024-03-20 DIAGNOSIS — Z79.4 TYPE 2 DIABETES MELLITUS WITH OTHER SPECIFIED COMPLICATION, WITH LONG-TERM CURRENT USE OF INSULIN (HCC): ICD-10-CM

## 2024-03-20 PROCEDURE — 99214 OFFICE O/P EST MOD 30 MIN: CPT

## 2024-03-20 PROCEDURE — 99212 OFFICE O/P EST SF 10 MIN: CPT

## 2024-03-20 PROCEDURE — 3074F SYST BP LT 130 MM HG: CPT

## 2024-03-20 PROCEDURE — 3078F DIAST BP <80 MM HG: CPT

## 2024-03-20 ASSESSMENT — FIBROSIS 4 INDEX: FIB4 SCORE: 2.55

## 2024-03-20 NOTE — PROGRESS NOTES
RN-CDE Note    Subjective:   Endocrinology Clinic Progress Note  PCP: ANGELITA Concepcion    HPI:  Tere Gallegos is a 73 y.o. old patient who is seen today by the Diabetes Nurse Specialist for review of her type 2 diabetes on long term use of insulin.      DM:   Last A1c:   Lab Results   Component Value Date/Time    HBA1C 7.2 (H) 02/29/2024 09:30 AM    HBA1C 7.2 (A) 02/29/2024 09:30 AM      A1C GOAL: < 7    Diabetes Medications:   Lantus    Novolog    Patient's body mass index is 26.46 kg/m². Exercise and nutrition counseling were performed at this visit.  Foot Exam:  Monofilament: current.   Lab Results   Component Value Date/Time    MALBCRT 25 02/29/2024 09:30 AM    MICROALBUR 3.0 02/29/2024 09:30 AM        ACR Albumin/Creatinine Ratio goal <30     HTN:   Blood pressure goal <130/<80 .   Currently Rx ACE/ARB: Yes     Dyslipidemia:    Lab Results   Component Value Date/Time    CHOLSTRLTOT 97 (L) 02/18/2023 04:17 AM    LDL 37 02/18/2023 04:17 AM    HDL 31 (A) 02/18/2023 04:17 AM    TRIGLYCERIDE 144 02/18/2023 04:17 AM         Currently Rx Statin: Yes     She  reports that she has never smoked. She has never used smokeless tobacco.      Plan:

## 2024-03-20 NOTE — PROGRESS NOTES
"Chief Complaint: Follow-up on the following conditions  HPI:   Tere Gallegos is a 73 y.o. female    1. Controlled type 2 diabetes mellitus with hyperglycemia:   Type 2 Diabetes Mellitus diagnosed 20 years ago.      She checks her blood sugars 3x/day  Fasting blood sugars-95    POC A1c on 2/29/2024 at 7.2%  POC a1c on 6/7/2023 at 6.2%  POC a1c on 02/18/2023 6.0%  POC a1c on 1/03/2023 at 5.8%  Last A1C on 11/8/21 at 5.7%, she was a dialysis patient which makes A1c unreliable.    Diabetes management:  Lantus 5 units-  Novolg 4 units if BG >200 3-4 units before each meal plus a sliding scale    She denies hypoglycemic episodes.    She  denies hypoglycemic unawareness.   She denies episodes of severe hypoglycemia requiring third party assistance.      Diet: \"healthy\" diet  in general  Exercise: minimal     Diabetes Complications   She  reports history of mild proliferative diabetic retinopathy.    She denies laser eye surgery.   Cataract surgery both eyes were done this year   Last eye exam: Dr. Larose in July appt of 2023  Opthlmology appt is on January 10th     Latest Reference Range & Units 01/23/23 08:48   Fructosamine 205 - 285 umol/L 259      Latest Reference Range & Units 11/12/19 12:47   C-Peptide 0.8 - 3.5 ng/mL 15.2 (H)      Latest Reference Range & Units 11/12/19 12:47   MAN Antibody 0.0 - 5.0 IU/mL <5.0       2.  Neuropathy:   Reports numbness or tingling to her feet but reports pain occasionally she is currently taking Lyrica.  she denies history of foot sores.     3.  Kidney transplant status:  Surgery in [facility] in 10/31/22   Currently taking Losartan  Dr. Blackmon nephrology     Latest Reference Range & Units 02/29/24 09:30   Micro Alb Creat Ratio 0 - 30 mg/g 25   Creatinine, Urine mg/dL 119.99   Microalbumin, Urine Random mg/dL 3.0      Latest Reference Range & Units 02/29/24 09:32   Bun 8 - 22 mg/dL 34 (H)   Creatinine 0.50 - 1.40 mg/dL 1.63 (H)   GFR (CKD-EPI) >60 mL/min/1.73 m 2 33 ! "     4.  Cardiovascular disease:  Paroxysmal Atrial Fibrillation-She is taking Eliquis.   She is seeing at [facility] Santamaria     5.  Hypothyroidism, acquired:   She is currently levothyroixine 75 mcg of levothyroxine daily   She takes it every morning on an empty stomach  Denies taking any iron, calcium, multivitamins, antiacids with the medication    Denies fatigue, constipation, dry skin, palpitations.      Latest Reference Range & Units 09/05/23 07:48   TSH 0.380 - 5.330 uIU/mL 2.780     6. Essential Hypertension:  FV by cardiology   Currently taking Norvasc 10 mg, carvedilol 25 mg, lisinopril 40 mg  BP in office today was 110/62   denies any dizziness, palpitations, chest pain    7.  Dyslipidemia:  She is currently taking statin-atorvastatin 20 mg daily   She still complaining of muscle aches especially in her lower legs   Latest Reference Range & Units 02/18/23 04:17   Cholesterol,Tot 100 - 199 mg/dL 97 (L)   Triglycerides 0 - 149 mg/dL 144   HDL >=40 mg/dL 31 !   LDL <100 mg/dL 37       8. Vitamin D deficiency:  Currently not taking any vitamin D  Fv by Dr. Blackmon    Latest Reference Range & Units 09/14/23 07:06   25-Hydroxy   Vitamin D 25 30 - 100 ng/mL 14 (L)      Latest Reference Range & Units 09/14/23 07:06   Pth, Intact 14.0 - 72.0 pg/mL 103.0 (H)     ROS:     CONS:     No fever, no chills, no weight loss, no fatigue   EYES:      No diplopia, no blurry vision, no redness of eyes, no swelling of eyelids   ENT:    No hearing loss, No ear pain, No sore throat, no dysphagia, no neck swelling   CV:     No chest pain, no palpitations, no claudication, no orthopnea, no PND   PULM:    No SOB, no cough, no hemoptysis, no wheezing    GI:   No nausea, no vomiting, no diarrhea, no constipation, no bloody stools   :  Passing urine well, no dysuria, no hematuria   ENDO:   No polyuria, no polydipsia, no heat intolerance, no cold intolerance   NEURO: No headaches, no  dizziness, no convulsions, no tremors   MUSC:  No joint swellings, no arthralgias, no myalgias, no weakness   SKIN:   No rash, no ulcers, no dry skin   PSYCH:   No depression, no anxiety, no difficulty sleeping       Past Medical History:  Patient Active Problem List    Diagnosis Date Noted    Hypertensive heart and chronic kidney disease with heart failure and with stage 5 chronic kidney disease, or end stage renal disease ([facility]) 2024    Polyneuropathy in diseases classified elsewhere ([facility]) 2024    Diabetic polyneuropathy associated with type 2 diabetes mellitus ([facility]) 2024    Immunosuppression due to drug therapy ([facility]) 12/15/2023    Acquired circulating anticoagulants ([facility]) 2023    Infection due to ESBL-producing Escherichia coli 2023    Metabolic acidosis 2023    Bradycardia 2023    Low bicarbonate level 2023    BK viremia 2023    Itching 2023    Other hydronephrosis 2023    Long term current use of immunosuppressive drug 2023    Leg swelling 2023    Permanent atrial fibrillation ([facility]) 2023    Multifocal pneumonia 2023    Stage 3a chronic kidney disease ([facility]) 2023    Drug-induced constipation 2023    Chronic anticoagulation 2022    Long-term insulin use ([facility]) 2022    -donor kidney transplant recipient 2022    Lung nodules 10/22/2022    GERD (gastroesophageal reflux disease) 10/19/2022    Normocytic anemia 2022    Secondary hypercoagulable state ([facility]) 2022    Atrial fibrillation ([facility]) 2021    Leukopenia 2021    Aplastic anemia, unspecified ([facility]) 2020    Presence of drug-eluting stent in right coronary artery     Chronic heart failure with preserved ejection fraction ([facility]) 2020    Hypothyroidism 2020    Dental disorder 2020    Coronary artery disease with angina pectoris with documented spasm ([facility])     Mixed hyperlipidemia 2019     Elevated troponin 2018    RLS (restless legs syndrome) 2016    Type 2 diabetes mellitus with stage 3b chronic kidney disease, with long-term current use of insulin (HCC) 2016    Renovascular hypertension 2013       Past Surgical History:  Past Surgical History:   Procedure Laterality Date    URETERAL REIMPLANTATION  2023    transplant ureter reimplantation - [facility]    OTHER ABDOMINAL SURGERY Right 10/31/2022     Donor kidney transplant - [facility] CARDIAC CATH  2016    RCA stented with 2 Synergy drug-eluting stents.    RECOVERY  2016    Procedure: CATH LAB [facility] WITH POSSIBLE DR. CASTILLO;  Surgeon: [facility];  [place] [facility]  Service:     [facility] CARDIAC CATH  2016    100% RCA    OTHER Left 2014    left arm upper extremity fistula    OTHER ABDOMINAL SURGERY      left kidney removed due to cancer        Allergies:  Patient has no known allergies.     Current Medications:    Current Outpatient Medications:     levothyroxine (SYNTHROID) 75 MCG Tab, TAKE 1 TABLET BY MOUTH EVERY DAY IN THE MORNING ON AN EMPTY STOMACH, Disp: 90 Tablet, Rfl: 4    apixaban (ELIQUIS) 5mg Tab, Take 1 Tablet by mouth 2 times a day., Disp: 180 Tablet, Rfl: 1    glucose blood (ACCU-CHEK GUIDE) strip, USE ONE COVERED STRIP TO TEST BLOOD SUGAR THREE TIMES DAILY., Disp: 100 Strip, Rfl: 3    Lancets, Use one covered lancet to test blood sugar three times daily., Disp: 100 Each, Rfl: 3    triamcinolone acetonide (KENALOG) 0.1 % Cream, Aplique fernando cantidad del tamano de un guisante sobre la piel fernando vez al fernanda janna sea necesario para la picazon o el sarpullido., Disp: 45 g, Rfl: 2    losartan (COZAAR) 50 MG Tab, Take 1 Tablet by mouth every day., Disp: 90 Tablet, Rfl: 3    metoprolol SR (TOPROL XL) 25 MG TABLET SR 24 HR, Take 1 Tablet by mouth every day., Disp: 30 Tablet, Rfl: 3    amLODIPine (NORVASC) 10 MG Tab, TOME FERNANDO TABLETA TODOS  JUDY MCCORMICK, Disp: 90 Tablet, Rfl: 1    atorvastatin (LIPITOR) 20 MG Tab, TAKE 1 TABLET BY MOUTH EVERY DAY IN THE EVENING, Disp: 90 Tablet, Rfl: 3    docusate sodium (COLACE) 100 MG Cap, Take 1 Capsule by mouth 2 times a day as needed for Constipation (Constipation)., Disp: 180 Capsule, Rfl: 3    sodium bicarbonate (SODIUM BICARBONATE) 650 MG Tab, Take 1 Tablet by mouth 2 times a day., Disp: 180 Tablet, Rfl: 3    amiodarone (CORDARONE) 200 MG Tab, Take 1 Tablet by mouth every day., Disp: 100 Tablet, Rfl: 3    tacrolimus (PROGRAF) 1 MG Cap, Take 2 Capsules by mouth 2 times a day., Disp: 120 Capsule, Rfl: 0    mycophenolate (CELLCEPT) 250 MG Cap, Take 1 Capsule by mouth 2 times a day., Disp: 20 Capsule, Rfl: 0    insulin aspart (NOVOLOG FLEXPEN) 100 UNIT/ML injection PEN, Inject 0-12 Units under the skin 4 Times a Day,Before Meals and at Bedtime. Unspecified sliding scale., Disp: , Rfl:     predniSONE (DELTASONE) 5 MG Tab, Take 5 mg by mouth every day., Disp: , Rfl:     Social History:  Social History     Socioeconomic History    Marital status:      Spouse name: Not on file    Number of children: Not on file    Years of education: Not on file    Highest education level: Not on file   Occupational History    Not on file   Tobacco Use    Smoking status: Never    Smokeless tobacco: Never   Vaping Use    Vaping Use: Never used   Substance and Sexual Activity    Alcohol use: No     Alcohol/week: 0.0 oz    Drug use: No    Sexual activity: Never   Other Topics Concern    Not on file   Social History Narrative    Not on file     Social Determinants of Health     Financial Resource Strain: Not on file   Food Insecurity: Not on file   Transportation Needs: Not on file   Physical Activity: Not on file   Stress: Not on file   Social Connections: Not on file   Intimate Partner Violence: Not on file   Housing Stability: Not on file        Family History:   Family History   Problem Relation Age of Onset    Diabetes Sister      Other Sister         liver disease    Diabetes Brother     Heart Disease Neg Hx        PHYSICAL EXAM:   Vital signs: /68 (BP Location: Left arm, Patient Position: Sitting)   Pulse 60   Resp 16   Ht 1.524 m (5')   Wt 61.5 kg (135 lb 8 oz)   LMP  (LMP Unknown)   SpO2 99%   BMI 26.46 kg/m²   GENERAL: Well-developed, well-nourished  in no apparent distress.   FOOT: Normal sensation to monofilament testing, normal pulses, no ulcers.  Normal Vibration quantitative sensation test.    Labs:  Lab Results   Component Value Date/Time    HBA1C 5.7 (A) 12/08/2021 1520    AVGLUC 111 04/28/2021 0937     Lab Results   Component Value Date/Time    CHOLSTRLTOT 125 09/29/2020 1056    TRIGLYCERIDE 113 09/29/2020 1056    HDL 48 09/29/2020 1056    LDL 54 09/29/2020 1056       Lab Results   Component Value Date/Time    MALBCRT 25 02/29/2024 09:30 AM    MICROALBUR 3.0 02/29/2024 09:30 AM        Lab Results   Component Value Date/Time    TSHULTRASEN 4.150 08/12/2021 0201     Lab Results   Component Value Date/Time    FREEDIR 1.52 01/28/2021 0700         ASSESSMENT/PLAN:   1. Controlled type 2 diabetes mellitus with hyperglycemia, with long-term current use of insulin (HCC)  Stable with an A1c of 7.2%  - Exercise for least 150 minutes/week  - Stable hydration  - Daily feet check  - Maintain a healthy diet, minimal carbohydrate, portion control   --Pt did not do blood work prior to this appt     Diabetes management:  Lantus 5 units-5 units in the am   Novolg 4 units if BG >200 - 5 units/before meals       2. Neuropathy  Stable at this time  Continue appointment-HPI    3. Kidney transplant status  Unstable  Following nephrology    4. Cardiovascular disease  Stable  Follow-up cardiology    5. Hypothyroidism, acquired  - Clinically stable  - Biochemically stable  - Continue regimen-HPI       6. Essential hypertension  Stable  Continue regimen-HPI    7. Dyslipidemia  Unstable  Continue regimen-HPI      8. Vitamin D  deficiency  Unstable  Following nephrology    Disposition: Follow-up in 6 months  Do blood work 2 week prior to next appointment with me      Thank you kindly for allowing me to participate in the diabetes care plan for this patient.    ANGELITA Guzmán  03/20/24      CC:   ANGELITA Concepcion   X Size Of Lesion In Cm (Optional): 0 Detail Level: Detailed

## 2024-04-03 ENCOUNTER — HOSPITAL ENCOUNTER (OUTPATIENT)
Dept: LAB | Facility: MEDICAL CENTER | Age: 74
End: 2024-04-03
Attending: INTERNAL MEDICINE
Payer: MEDICARE

## 2024-04-03 LAB
ANION GAP SERPL CALC-SCNC: 7 MMOL/L (ref 7–16)
BASOPHILS # BLD AUTO: 0.2 % (ref 0–1.8)
BASOPHILS # BLD: 0.01 K/UL (ref 0–0.12)
BUN SERPL-MCNC: 31 MG/DL (ref 8–22)
CALCIUM SERPL-MCNC: 10.3 MG/DL (ref 8.4–10.2)
CHLORIDE SERPL-SCNC: 106 MMOL/L (ref 96–112)
CO2 SERPL-SCNC: 25 MMOL/L (ref 20–33)
CREAT SERPL-MCNC: 1.55 MG/DL (ref 0.5–1.4)
EOSINOPHIL # BLD AUTO: 0.04 K/UL (ref 0–0.51)
EOSINOPHIL NFR BLD: 0.9 % (ref 0–6.9)
ERYTHROCYTE [DISTWIDTH] IN BLOOD BY AUTOMATED COUNT: 46.9 FL (ref 35.9–50)
GFR SERPLBLD CREATININE-BSD FMLA CKD-EPI: 35 ML/MIN/1.73 M 2
GLUCOSE SERPL-MCNC: 121 MG/DL (ref 65–99)
HCT VFR BLD AUTO: 45.1 % (ref 37–47)
HGB BLD-MCNC: 14.1 G/DL (ref 12–16)
IMM GRANULOCYTES # BLD AUTO: 0.02 K/UL (ref 0–0.11)
IMM GRANULOCYTES NFR BLD AUTO: 0.4 % (ref 0–0.9)
LYMPHOCYTES # BLD AUTO: 0.76 K/UL (ref 1–4.8)
LYMPHOCYTES NFR BLD: 16.5 % (ref 22–41)
MCH RBC QN AUTO: 28.4 PG (ref 27–33)
MCHC RBC AUTO-ENTMCNC: 31.3 G/DL (ref 32.2–35.5)
MCV RBC AUTO: 90.7 FL (ref 81.4–97.8)
MONOCYTES # BLD AUTO: 0.42 K/UL (ref 0–0.85)
MONOCYTES NFR BLD AUTO: 9.1 % (ref 0–13.4)
NEUTROPHILS # BLD AUTO: 3.37 K/UL (ref 1.82–7.42)
NEUTROPHILS NFR BLD: 72.9 % (ref 44–72)
NRBC # BLD AUTO: 0 K/UL
NRBC BLD-RTO: 0 /100 WBC (ref 0–0.2)
PHOSPHATE SERPL-MCNC: 3.1 MG/DL (ref 2.5–4.5)
PLATELET # BLD AUTO: 100 K/UL (ref 164–446)
PMV BLD AUTO: 10.4 FL (ref 9–12.9)
POTASSIUM SERPL-SCNC: 5.5 MMOL/L (ref 3.6–5.5)
RBC # BLD AUTO: 4.97 M/UL (ref 4.2–5.4)
SODIUM SERPL-SCNC: 138 MMOL/L (ref 135–145)
TACROLIMUS BLD-MCNC: 11.4 NG/ML (ref 5–20)
WBC # BLD AUTO: 4.6 K/UL (ref 4.8–10.8)

## 2024-04-03 PROCEDURE — 36415 COLL VENOUS BLD VENIPUNCTURE: CPT

## 2024-04-03 PROCEDURE — 80197 ASSAY OF TACROLIMUS: CPT

## 2024-04-03 PROCEDURE — 85025 COMPLETE CBC W/AUTO DIFF WBC: CPT

## 2024-04-03 PROCEDURE — 80048 BASIC METABOLIC PNL TOTAL CA: CPT

## 2024-04-03 PROCEDURE — 84100 ASSAY OF PHOSPHORUS: CPT

## 2024-04-30 NOTE — MR AVS SNAPSHOT
"        Tere Swann   2017 12:45 PM   Office Visit   MRN: [id number]    Department:  [facility]   Dept Phone:  [phone]    Description:  Female : 1950   Provider:  Ritesh Borjas M.D.           Reason for Visit     Follow-Up           Allergies as of 2017     No Known Allergies      You were diagnosed with     Chronic total occlusion of native coronary artery   [5555497]       S/P coronary artery stent placement   [[id number]]       Essential hypertension   [1644315]       Abnormal cardiovascular stress test   [991068]         Vital Signs     Blood Pressure Pulse Height Weight Body Mass Index Last Menstrual Period    180/60 mmHg 70 1.626 m (5' 4.02\") 58.514 kg (129 lb) 22.13 kg/m2 (LMP Unknown)    Smoking Status                   Never Smoker            Basic Information     Date Of Birth Sex Race Ethnicity Preferred Language    1950 Female  or   Origin (Togolese,Grenadian,Palestinian,Papa, etc) English      Problem List              ICD-10-CM Priority Class Noted - Resolved    Essential hypertension I10   2013 - Present    CKD (chronic kidney disease) stage 5, GFR less than 15 ml/min (CMS-HCC) N18.5   2014 - Present    Hyperkalemia E87.5   2014 - Present    Renal hypertension I12.9   2016 - Present    End stage renal failure on dialysis (CMS-HCC) N18.6, Z99.2   2016 - Present    Type 2 diabetes mellitus (CMS-HCC) E11.9   2016 - Present    Kidney transplant candidate (Chronic) Z76.82   Unknown - Present    Abnormal cardiovascular stress test (Chronic) R94.39   Unknown - Present    Encounter to establish care with new doctor Z71.89   2016 - Present    Chronic total occlusion of native coronary artery I25.10, I25.82   2016 - Present    S/P coronary artery stent placement Z95.5   2016 - Present    Encounter for cervical Pap smear with pelvic exam Z01.419   2016 - Present      Health Maintenance        Date Due " Completion Dates    A1C SCREENING 6/11/1951 ---    DIABETES MONOFILAMENT / LE EXAM 6/11/1950 ---    RETINAL SCREENING 12/11/1967 ---    URINE ACR / MICROALBUMIN 12/11/1967 ---    IMM DTaP/Tdap/Td Vaccine (1 - Tdap) 12/11/1968 ---    COLONOSCOPY 12/11/1999 ---    IMM ZOSTER VACCINE 12/11/2009 ---    BONE DENSITY 12/11/2014 ---    IMM PNEUMOCOCCAL 65+ (ADULT) HIGH/HIGHEST RISK SERIES (1 of 2 - PCV13) 12/11/2014 ---    MAMMOGRAM 7/20/2017 7/20/2016    FASTING LIPID PROFILE 10/4/2017 10/4/2016    SERUM CREATININE 10/4/2017 10/4/2016, 9/8/2016, 9/6/2016, 8/16/2016, 7/13/2016    PAP SMEAR 9/23/2019 9/23/2016            Current Immunizations     No immunizations on file.      Below and/or attached are the medications your provider expects you to take. Review all of your home medications and newly ordered medications with your provider and/or pharmacist. Follow medication instructions as directed by your provider and/or pharmacist. Please keep your medication list with you and share with your provider. Update the information when medications are discontinued, doses are changed, or new medications (including over-the-counter products) are added; and carry medication information at all times in the event of emergency situations     Allergies:  No Known Allergies          Medications  Valid as of: June 28, 2017 -  1:14 PM    Generic Name Brand Name Tablet Size Instructions for use    AmLODIPine Besylate (Tab) NORVASC 10 MG Take 1 Tab by mouth every day.        Aspirin (Chew Tab) ASA 81 MG Take 1 Tab by mouth every day.        Clopidogrel Bisulfate (Tab) PLAVIX 75 MG Take 1 Tab by mouth every day.        Furosemide (Tab) LASIX 40 MG Take 1 Tab by mouth every day.        Furosemide (Tab) LASIX 40 MG TAKE 1 TABLET BY MOUTH DAILY        GlipiZIDE (Tab) GLUCOTROL 5 MG Take 1 Tab by mouth every day.        Metoprolol Tartrate (Tab) LOPRESSOR 25 MG Take 1 Tab by mouth 2 Times a Day.        Pravastatin Sodium (Tab) PRAVACHOL 40 MG  Take 1 Tab by mouth every day.        .                 Medicines prescribed today were sent to:     [id number] - [place], [place] - [address]    [address] [place] [address]    Phone: [phone] Fax: [phone]    Open 24 Hours?: No      Medication refill instructions:       If your prescription bottle indicates you have medication refills left, it is not necessary to call your provider’s office. Please contact your pharmacy and they will refill your medication.    If your prescription bottle indicates you do not have any refills left, you may request refills at any time through one of the following ways: The online Merchant View system (except Urgent Care), by calling your provider’s office, or by asking your pharmacy to contact your provider’s office with a refill request. Medication refills are processed only during regular business hours and may not be available until the next business day. Your provider may request additional information or to have a follow-up visit with you prior to refilling your medication.   *Please Note: Medication refills are assigned a new Rx number when refilled electronically. Your pharmacy may indicate that no refills were authorized even though a new prescription for the same medication is available at the pharmacy. Please request the medicine by name with the pharmacy before contacting your provider for a refill.           Merchant View Access Code: [email or web address]  Expires: 7/28/2017  1:14 PM    Merchant View  A secure, online tool to manage your health information     [employer]’s Merchant View® is a secure, online tool that connects you to your personalized health information from the privacy of your home -- day or night - making it very easy for you to manage your healthcare. Once the activation process is completed, you can even access your medical information using the Merchant View dennis, which is available for free in the Apple Dennis store or Google Play store.     Merchant View provides the  following levels of access (as shown below):   My Chart Features   [facility] Doctor [facility]  Specialists [facility]  Urgent  Care Non-[facility]  Primary Care  Doctor   Email your healthcare team securely and privately 24/7 X X X    Manage appointments: schedule your next appointment; view details of past/upcoming appointments X      Request prescription refills. X      View recent personal medical records, including lab and immunizations X X X X   View health record, including health history, allergies, medications X X X X   Read reports about your outpatient visits, procedures, consult and ER notes X X X X   See your discharge summary, which is a recap of your hospital and/or ER visit that includes your diagnosis, lab results, and care plan. X X       How to register for Stamp.it:  1. Go to  [email or web address].  2. Click on the Sign Up Now box, which takes you to the New Member Sign Up page. You will need to provide the following information:  a. Enter your Stamp.it Access Code exactly as it appears at the top of this page. (You will not need to use this code after you’ve completed the sign-up process. If you do not sign up before the expiration date, you must request a new code.)   b. Enter your date of birth.   c. Enter your home email address.   d. Click Submit, and follow the next screen’s instructions.  3. Create a Stamp.it ID. This will be your Stamp.it login ID and cannot be changed, so think of one that is secure and easy to remember.  4. Create a Stamp.it password. You can change your password at any time.  5. Enter your Password Reset Question and Answer. This can be used at a later time if you forget your password.   6. Enter your e-mail address. This allows you to receive e-mail notifications when new information is available in Stamp.it.  7. Click Sign Up. You can now view your health information.    For assistance activating your Stamp.it account, call [phone]         <-- Click to add NO significant Past Surgical History

## 2024-05-02 ENCOUNTER — HOSPITAL ENCOUNTER (OUTPATIENT)
Dept: LAB | Facility: MEDICAL CENTER | Age: 74
End: 2024-05-02
Attending: INTERNAL MEDICINE
Payer: MEDICARE

## 2024-05-02 LAB
ALBUMIN SERPL BCP-MCNC: 3.8 G/DL (ref 3.2–4.9)
ALBUMIN/GLOB SERPL: 1.3 G/DL
ALP SERPL-CCNC: 80 U/L (ref 30–99)
ALT SERPL-CCNC: 18 U/L (ref 2–50)
ANION GAP SERPL CALC-SCNC: 8 MMOL/L (ref 7–16)
APPEARANCE UR: CLEAR
AST SERPL-CCNC: 16 U/L (ref 12–45)
BASOPHILS # BLD AUTO: 0.2 % (ref 0–1.8)
BASOPHILS # BLD: 0.01 K/UL (ref 0–0.12)
BILIRUB SERPL-MCNC: 0.5 MG/DL (ref 0.1–1.5)
BILIRUB UR QL STRIP.AUTO: NEGATIVE
BUN SERPL-MCNC: 28 MG/DL (ref 8–22)
CALCIUM ALBUM COR SERPL-MCNC: 10 MG/DL (ref 8.5–10.5)
CALCIUM SERPL-MCNC: 9.8 MG/DL (ref 8.4–10.2)
CHLORIDE SERPL-SCNC: 106 MMOL/L (ref 96–112)
CO2 SERPL-SCNC: 23 MMOL/L (ref 20–33)
COLOR UR: YELLOW
CREAT SERPL-MCNC: 1.34 MG/DL (ref 0.5–1.4)
EOSINOPHIL # BLD AUTO: 0.03 K/UL (ref 0–0.51)
EOSINOPHIL NFR BLD: 0.6 % (ref 0–6.9)
ERYTHROCYTE [DISTWIDTH] IN BLOOD BY AUTOMATED COUNT: 46.7 FL (ref 35.9–50)
GFR SERPLBLD CREATININE-BSD FMLA CKD-EPI: 42 ML/MIN/1.73 M 2
GLOBULIN SER CALC-MCNC: 2.9 G/DL (ref 1.9–3.5)
GLUCOSE SERPL-MCNC: 151 MG/DL (ref 65–99)
GLUCOSE UR STRIP.AUTO-MCNC: NEGATIVE MG/DL
HCT VFR BLD AUTO: 43.3 % (ref 37–47)
HGB BLD-MCNC: 13.5 G/DL (ref 12–16)
IMM GRANULOCYTES # BLD AUTO: 0.03 K/UL (ref 0–0.11)
IMM GRANULOCYTES NFR BLD AUTO: 0.6 % (ref 0–0.9)
KETONES UR STRIP.AUTO-MCNC: NEGATIVE MG/DL
LEUKOCYTE ESTERASE UR QL STRIP.AUTO: NEGATIVE
LYMPHOCYTES # BLD AUTO: 0.59 K/UL (ref 1–4.8)
LYMPHOCYTES NFR BLD: 11.4 % (ref 22–41)
MCH RBC QN AUTO: 27.9 PG (ref 27–33)
MCHC RBC AUTO-ENTMCNC: 31.2 G/DL (ref 32.2–35.5)
MCV RBC AUTO: 89.5 FL (ref 81.4–97.8)
MICRO URNS: NORMAL
MONOCYTES # BLD AUTO: 0.44 K/UL (ref 0–0.85)
MONOCYTES NFR BLD AUTO: 8.5 % (ref 0–13.4)
NEUTROPHILS # BLD AUTO: 4.08 K/UL (ref 1.82–7.42)
NEUTROPHILS NFR BLD: 78.7 % (ref 44–72)
NITRITE UR QL STRIP.AUTO: NEGATIVE
NRBC # BLD AUTO: 0 K/UL
NRBC BLD-RTO: 0 /100 WBC (ref 0–0.2)
PH UR STRIP.AUTO: 6 [PH] (ref 5–8)
PLATELET # BLD AUTO: 104 K/UL (ref 164–446)
PMV BLD AUTO: 12 FL (ref 9–12.9)
POTASSIUM SERPL-SCNC: 5.5 MMOL/L (ref 3.6–5.5)
PROT SERPL-MCNC: 6.7 G/DL (ref 6–8.2)
PROT UR QL STRIP: NEGATIVE MG/DL
RBC # BLD AUTO: 4.84 M/UL (ref 4.2–5.4)
RBC UR QL AUTO: NEGATIVE
SODIUM SERPL-SCNC: 137 MMOL/L (ref 135–145)
SP GR UR STRIP.AUTO: 1.01
WBC # BLD AUTO: 5.2 K/UL (ref 4.8–10.8)

## 2024-05-03 LAB — TACROLIMUS BLD-MCNC: 6.8 NG/ML (ref 5–20)

## 2024-05-04 LAB
BK PLASMA INTERP, QNT NAAT NL11711: DETECTED
BK PLASMA IU/ML, QNT NAAT NL11709: 394 IU/ML
BK PLASMA LOG IU/ML, QNT NAAT NL11710: 2.6 LOG IU/ML
BK UR INTERP, QNT NAAT NL11708: DETECTED
BK UR IU/ML, QNT NAAT NL11706: ABNORMAL IU/ML
BK UR LOG IU/ML, QNT NAAT NL11707: 6.05 LOG IU/ML

## 2024-05-07 DIAGNOSIS — I48.91 ATRIAL FIBRILLATION WITH RAPID VENTRICULAR RESPONSE (HCC): ICD-10-CM

## 2024-05-07 DIAGNOSIS — I48.0 PAROXYSMAL A-FIB (HCC): ICD-10-CM

## 2024-05-08 NOTE — TELEPHONE ENCOUNTER
Received request via: Pharmacy    Was the patient seen in the last year in this department?yes     Does the patient have an active prescription (recently filled or refills available) for medication(s) requested? No    [facility]    Does the patient have care home Plus and need 100 day supply (blood pressure, diabetes and cholesterol meds only)? Patient does not have SCP

## 2024-05-10 RX ORDER — METOPROLOL SUCCINATE 25 MG/1
25 TABLET, EXTENDED RELEASE ORAL
Qty: 90 TABLET | Refills: 3 | Status: SHIPPED | OUTPATIENT
Start: 2024-05-10

## 2024-05-15 ENCOUNTER — OFFICE VISIT (OUTPATIENT)
Dept: CARDIOLOGY | Facility: MEDICAL CENTER | Age: 74
End: 2024-05-15
Attending: INTERNAL MEDICINE
Payer: MEDICARE

## 2024-05-15 VITALS
HEART RATE: 64 BPM | RESPIRATION RATE: 16 BRPM | OXYGEN SATURATION: 94 % | WEIGHT: 132.2 LBS | HEIGHT: 60 IN | SYSTOLIC BLOOD PRESSURE: 116 MMHG | BODY MASS INDEX: 25.95 KG/M2 | DIASTOLIC BLOOD PRESSURE: 66 MMHG

## 2024-05-15 DIAGNOSIS — I48.0 PAROXYSMAL ATRIAL FIBRILLATION (HCC): ICD-10-CM

## 2024-05-15 DIAGNOSIS — N18.31 STAGE 3A CHRONIC KIDNEY DISEASE: ICD-10-CM

## 2024-05-15 DIAGNOSIS — D84.821 IMMUNOSUPPRESSION DUE TO DRUG THERAPY (HCC): ICD-10-CM

## 2024-05-15 DIAGNOSIS — Z79.899 IMMUNOSUPPRESSION DUE TO DRUG THERAPY (HCC): ICD-10-CM

## 2024-05-15 DIAGNOSIS — Z94.0 DECEASED-DONOR KIDNEY TRANSPLANT RECIPIENT: ICD-10-CM

## 2024-05-15 DIAGNOSIS — I25.84 CORONARY ARTERY DISEASE DUE TO CALCIFIED CORONARY LESION: ICD-10-CM

## 2024-05-15 DIAGNOSIS — D68.69 SECONDARY HYPERCOAGULABLE STATE (HCC): ICD-10-CM

## 2024-05-15 DIAGNOSIS — I25.10 CORONARY ARTERY DISEASE DUE TO CALCIFIED CORONARY LESION: ICD-10-CM

## 2024-05-15 PROBLEM — E11.42 DIABETIC POLYNEUROPATHY ASSOCIATED WITH TYPE 2 DIABETES MELLITUS (HCC): Status: RESOLVED | Noted: 2024-02-28 | Resolved: 2024-05-15

## 2024-05-15 PROBLEM — D68.318 ACQUIRED CIRCULATING ANTICOAGULANTS (HCC): Status: RESOLVED | Noted: 2023-11-24 | Resolved: 2024-05-15

## 2024-05-15 PROBLEM — D61.9 APLASTIC ANEMIA, UNSPECIFIED (HCC): Status: RESOLVED | Noted: 2020-11-18 | Resolved: 2024-05-15

## 2024-05-15 PROBLEM — Z79.01 CHRONIC ANTICOAGULATION: Status: RESOLVED | Noted: 2022-12-27 | Resolved: 2024-05-15

## 2024-05-15 PROBLEM — I48.21 PERMANENT ATRIAL FIBRILLATION (HCC): Chronic | Status: RESOLVED | Noted: 2023-04-01 | Resolved: 2024-05-15

## 2024-05-15 LAB — EKG IMPRESSION: NORMAL

## 2024-05-15 PROCEDURE — 3078F DIAST BP <80 MM HG: CPT | Performed by: INTERNAL MEDICINE

## 2024-05-15 PROCEDURE — 99214 OFFICE O/P EST MOD 30 MIN: CPT | Performed by: INTERNAL MEDICINE

## 2024-05-15 PROCEDURE — 93010 ELECTROCARDIOGRAM REPORT: CPT | Performed by: INTERNAL MEDICINE

## 2024-05-15 PROCEDURE — G2211 COMPLEX E/M VISIT ADD ON: HCPCS | Performed by: INTERNAL MEDICINE

## 2024-05-15 PROCEDURE — 3074F SYST BP LT 130 MM HG: CPT | Performed by: INTERNAL MEDICINE

## 2024-05-15 ASSESSMENT — FIBROSIS 4 INDEX: FIB4 SCORE: 2.68

## 2024-05-15 NOTE — PROGRESS NOTES
CARDIOLOGY OUTPATIENT FOLLOWUP    PCP: ANGELITA Concepcion    1. Paroxysmal atrial fibrillation (HCC)    2. Coronary artery disease due to calcified coronary lesion    3. -donor kidney transplant recipient    4. Secondary hypercoagulable state (HCC)    5. Stage 3a chronic kidney disease    6. Immunosuppression due to drug therapy (HCC)        Tere Gallegos has symptoms of dizziness and shortness of breath as well as a heavy feeling in the chest.  There are no overt signs of cardiovascular disease on echo or laboratory evaluation.  I will have her complete a 2 view chest x-ray and round out the laboratory assessment with thyroid panel.  For now expectant management will be pursued from a cardiac stance though if the symptoms are progressive then we can consider additional evaluation.    I will check on her blood pressure in 2 weeks to assess adequacy of control.    Follow up: 6 months    History: Tere Gallegos is a 73 y.o. female with paroxysmal atrial fibrillation, RCA , normal LVEF, renal transplant and diabetes presenting for follow-up.    Since her last evaluation she has been experiencing fatigue and shortness of breath when climbing in her bed.  She feels a heaviness in the chest.  Additionally her granddaughter who accompanies her today notes that she has developed unsteadiness in her gait.  She is undergoing ENT evaluation and has some abnormal findings identified.      Physical Exam:  /66 (BP Location: Left arm, Patient Position: Sitting, BP Cuff Size: Adult)   Pulse 64   Resp 16   Ht 1.524 m (5')   Wt 60 kg (132 lb 3.2 oz)   LMP  (LMP Unknown)   SpO2 94%   BMI 25.82 kg/m²   GEN: NAD  CARDIAC: Regular. Normal S1, S2, Systolic murmur.   VASCULATURE: Normal carotid upstroke  RESP: Clear to auscultation bilaterally  ABD: Soft, non-tender, non-distended  EXT: No edema  NEURO: No focal deficit       () Today's E/M visit is associated with medical care  services that serve as the continuing focal point for all needed health care services and/or with medical care services that  are part of ongoing care related to a patient's single, serious condition, or a complex condition: This includes  furnishing services to patients on an ongoing basis that result in care that is personalized  to the patient. The services result in a comprehensive, longitudinal, and continuous  relationship with the patient and involve delivery of team-based care that is accessible, coordinated with other practitioners and providers, and integrated with the broader health  care landscape.     The ASCVD Risk score (Jose DK, et al., 2019) failed to calculate.    Studies  Lab Results   Component Value Date/Time    CHOLSTRLTOT 97 (L) 02/18/2023 04:17 AM    LDL 37 02/18/2023 04:17 AM    HDL 31 (A) 02/18/2023 04:17 AM    TRIGLYCERIDE 144 02/18/2023 04:17 AM       Lab Results   Component Value Date/Time    SODIUM 137 05/02/2024 07:04 AM    POTASSIUM 5.5 05/02/2024 07:04 AM    CHLORIDE 106 05/02/2024 07:04 AM    CO2 23 05/02/2024 07:04 AM    GLUCOSE 151 (H) 05/02/2024 07:04 AM    BUN 28 (H) 05/02/2024 07:04 AM    CREATININE 1.34 05/02/2024 07:04 AM    BUNCREATRAT 8 (L) 01/28/2021 07:00 AM     Lab Results   Component Value Date/Time    ALKPHOSPHAT 80 05/02/2024 07:04 AM    ASTSGOT 16 05/02/2024 07:04 AM    ALTSGPT 18 05/02/2024 07:04 AM    TBILIRUBIN 0.5 05/02/2024 07:04 AM          Past Medical History:   Diagnosis Date    Acquired hypothyroidism 05/04/2020    CAD (coronary artery disease)     Chronic diastolic heart failure (HCC) 05/04/2020    Coronary artery disease due to lipid rich plaque     2 Synergy NOEMI to 100% RCA stent placed    Dental disorder     partial dentures- uppers    Diabetes ([facility])     oral medication    ESRD (end stage renal disease) on dialysis ([facility]) 05/04/2020    Hemodialysis patient ([facility])     M, W, F    Hyperlipidemia     Hypertension     Kidney transplant candidate     Kidney  transplant recipient 10/31/2022    Presence of drug-eluting stent in right coronary artery     QT prolongation 01/22/2020    RLS (restless legs syndrome) 08/05/2016    Transaminitis 12/22/2018     No Known Allergies  Outpatient Encounter Medications as of 5/15/2024   Medication Sig Dispense Refill    metoprolol SR (TOPROL XL) 25 MG TABLET SR 24 HR TOME FERNANDO TABLETA TODOS LOS MCCORMICK 90 Tablet 3    levothyroxine (SYNTHROID) 75 MCG Tab TAKE 1 TABLET BY MOUTH EVERY DAY IN THE MORNING ON AN EMPTY STOMACH 90 Tablet 4    apixaban (ELIQUIS) 5mg Tab Take 1 Tablet by mouth 2 times a day. 180 Tablet 1    glucose blood (ACCU-CHEK GUIDE) strip USE ONE COVERED STRIP TO TEST BLOOD SUGAR THREE TIMES DAILY. 100 Strip 3    Lancets Use one covered lancet to test blood sugar three times daily. 100 Each 3    triamcinolone acetonide (KENALOG) 0.1 % Cream Aplique fernando cantidad del tamano de un guisante sobre la piel fernando vez al fernanda janna sea necesario para la picazon o el sarpullido. 45 g 2    losartan (COZAAR) 50 MG Tab Take 1 Tablet by mouth every day. 90 Tablet 3    amLODIPine (NORVASC) 10 MG Tab TOME FERNANDO TABLETA TODOS LOS MCCORMICK 90 Tablet 1    atorvastatin (LIPITOR) 20 MG Tab TAKE 1 TABLET BY MOUTH EVERY DAY IN THE EVENING 90 Tablet 3    docusate sodium (COLACE) 100 MG Cap Take 1 Capsule by mouth 2 times a day as needed for Constipation (Constipation). 180 Capsule 3    sodium bicarbonate (SODIUM BICARBONATE) 650 MG Tab Take 1 Tablet by mouth 2 times a day. 180 Tablet 3    amiodarone (CORDARONE) 200 MG Tab Take 1 Tablet by mouth every day. 100 Tablet 3    tacrolimus (PROGRAF) 1 MG Cap Take 2 Capsules by mouth 2 times a day. 120 Capsule 0    mycophenolate (CELLCEPT) 250 MG Cap Take 1 Capsule by mouth 2 times a day. 20 Capsule 0    insulin aspart (NOVOLOG FLEXPEN) 100 UNIT/ML injection PEN Inject 0-12 Units under the skin 4 Times a Day,Before Meals and at Bedtime. Unspecified sliding scale.      predniSONE (DELTASONE) 5 MG Tab Take 5 mg by  mouth every day.       No facility-administered encounter medications on file as of 5/15/2024.     Social History     Socioeconomic History    Marital status:      Spouse name: Not on file    Number of children: Not on file    Years of education: Not on file    Highest education level: Not on file   Occupational History    Not on file   Tobacco Use    Smoking status: Never    Smokeless tobacco: Never   Vaping Use    Vaping status: Never Used   Substance and Sexual Activity    Alcohol use: No     Alcohol/week: 0.0 oz    Drug use: No    Sexual activity: Not Currently   Other Topics Concern    Not on file   Social History Narrative    Not on file     Social Determinants of Health     Financial Resource Strain: Low Risk  (4/9/2024)    Overall Financial Resource Strain (CARDIA)     Difficulty of Paying Living Expenses: Not very hard   Food Insecurity: No Food Insecurity (4/9/2024)    Hunger Vital Sign     Worried About Running Out of Food in the Last Year: Never true     Ran Out of Food in the Last Year: Never true   Transportation Needs: No Transportation Needs (4/9/2024)    PRAPARE - Transportation     Lack of Transportation (Medical): No     Lack of Transportation (Non-Medical): No   Physical Activity: Inactive (4/9/2024)    Exercise Vital Sign     Days of Exercise per Week: 0 days     Minutes of Exercise per Session: 0 min   Stress: No Stress Concern Present (4/9/2024)    [employer] [place] [employer] - Occupational Stress Questionnaire     Feeling of Stress : Only a little   Social Connections: Moderately Isolated (4/9/2024)    Social Connection and Isolation Panel [NHANES]     Frequency of Communication with Friends and Family: More than three times a week     Frequency of Social Gatherings with Friends and Family: More than three times a week     Attends Synagogue Services: Never     Active Member of Clubs or Organizations: No     Attends Club or Organization Meetings: Never     Marital Status:     Intimate Partner Violence: Not At Risk (4/9/2024)    Humiliation, Afraid, Rape, and Kick questionnaire     Fear of Current or Ex-Partner: No     Emotionally Abused: No     Physically Abused: No     Sexually Abused: No   Housing Stability: Low Risk  (4/9/2024)    Housing Stability Vital Sign     Unable to Pay for Housing in the Last Year: No     Number of Places Lived in the Last Year: 1     Unstable Housing in the Last Year: No         ROS:   10 point review systems is otherwise negative except as per the HPI    Chief Complaint   Patient presents with    Atrial Fibrillation    Hyperlipidemia

## 2024-05-23 ENCOUNTER — APPOINTMENT (OUTPATIENT)
Dept: RADIOLOGY | Facility: MEDICAL CENTER | Age: 74
End: 2024-05-23
Attending: INTERNAL MEDICINE
Payer: MEDICARE

## 2024-05-23 DIAGNOSIS — I48.0 PAROXYSMAL ATRIAL FIBRILLATION (HCC): ICD-10-CM

## 2024-05-28 ENCOUNTER — TELEPHONE (OUTPATIENT)
Dept: CARDIOLOGY | Facility: MEDICAL CENTER | Age: 74
End: 2024-05-28
Payer: MEDICARE

## 2024-05-28 DIAGNOSIS — I48.0 PAROXYSMAL ATRIAL FIBRILLATION (HCC): ICD-10-CM

## 2024-05-28 DIAGNOSIS — I10 ESSENTIAL HYPERTENSION: ICD-10-CM

## 2024-05-28 RX ORDER — FUROSEMIDE 40 MG/1
40 TABLET ORAL DAILY
Qty: 30 TABLET | Refills: 1 | Status: SHIPPED | OUTPATIENT
Start: 2024-05-28

## 2024-05-28 NOTE — TELEPHONE ENCOUNTER
Phone Number Called: [phone]    Call outcome: spoke to patient granddaughter    Message: Called to inform patient granddaughter about BE recommendations and CXR results. Granddaughter verbalized understanding on POC. Orders placed at this time. All questions answered at this time. Advised to call back with any further questions or concerns.

## 2024-05-28 NOTE — TELEPHONE ENCOUNTER
----- Message from Damon Crawley M.D. sent at 5/23/2024  4:47 PM PDT -----  Chest x-ray is suggesting there could be an element of pulmonary edema/congestive heart failure related to the symptoms of breathlessness and fullness in the chest.  I recommend a therapeutic trial of 40 mg furosemide once daily, followed by a basic metabolic panel in 1 week and follow-up in our office in the coming weeks

## 2024-06-03 ENCOUNTER — HOSPITAL ENCOUNTER (OUTPATIENT)
Dept: LAB | Facility: MEDICAL CENTER | Age: 74
End: 2024-06-03
Attending: INTERNAL MEDICINE
Payer: MEDICARE

## 2024-06-03 LAB
ALBUMIN SERPL BCP-MCNC: 3.9 G/DL (ref 3.2–4.9)
ALBUMIN/GLOB SERPL: 1.3 G/DL
ALP SERPL-CCNC: 83 U/L (ref 30–99)
ALT SERPL-CCNC: 17 U/L (ref 2–50)
ANION GAP SERPL CALC-SCNC: 12 MMOL/L (ref 7–16)
APPEARANCE UR: ABNORMAL
AST SERPL-CCNC: 14 U/L (ref 12–45)
BACTERIA #/AREA URNS HPF: ABNORMAL /HPF
BASOPHILS # BLD AUTO: 0.5 % (ref 0–1.8)
BASOPHILS # BLD: 0.03 K/UL (ref 0–0.12)
BILIRUB SERPL-MCNC: 0.4 MG/DL (ref 0.1–1.5)
BILIRUB UR QL STRIP.AUTO: NEGATIVE
BUN SERPL-MCNC: 41 MG/DL (ref 8–22)
CALCIUM ALBUM COR SERPL-MCNC: 10.3 MG/DL (ref 8.5–10.5)
CALCIUM SERPL-MCNC: 10.2 MG/DL (ref 8.4–10.2)
CHLORIDE SERPL-SCNC: 105 MMOL/L (ref 96–112)
CO2 SERPL-SCNC: 21 MMOL/L (ref 20–33)
COLOR UR: YELLOW
CREAT SERPL-MCNC: 1.55 MG/DL (ref 0.5–1.4)
EOSINOPHIL # BLD AUTO: 0.03 K/UL (ref 0–0.51)
EOSINOPHIL NFR BLD: 0.5 % (ref 0–6.9)
EPI CELLS #/AREA URNS HPF: ABNORMAL /HPF
ERYTHROCYTE [DISTWIDTH] IN BLOOD BY AUTOMATED COUNT: 46.2 FL (ref 35.9–50)
GFR SERPLBLD CREATININE-BSD FMLA CKD-EPI: 35 ML/MIN/1.73 M 2
GLOBULIN SER CALC-MCNC: 2.9 G/DL (ref 1.9–3.5)
GLUCOSE SERPL-MCNC: 190 MG/DL (ref 65–99)
GLUCOSE UR STRIP.AUTO-MCNC: NEGATIVE MG/DL
HCT VFR BLD AUTO: 42.4 % (ref 37–47)
HGB BLD-MCNC: 13.3 G/DL (ref 12–16)
IMM GRANULOCYTES # BLD AUTO: 0.04 K/UL (ref 0–0.11)
IMM GRANULOCYTES NFR BLD AUTO: 0.7 % (ref 0–0.9)
KETONES UR STRIP.AUTO-MCNC: NEGATIVE MG/DL
LEUKOCYTE ESTERASE UR QL STRIP.AUTO: ABNORMAL
LYMPHOCYTES # BLD AUTO: 0.79 K/UL (ref 1–4.8)
LYMPHOCYTES NFR BLD: 14.4 % (ref 22–41)
MCH RBC QN AUTO: 27.8 PG (ref 27–33)
MCHC RBC AUTO-ENTMCNC: 31.4 G/DL (ref 32.2–35.5)
MCV RBC AUTO: 88.7 FL (ref 81.4–97.8)
MICRO URNS: ABNORMAL
MONOCYTES # BLD AUTO: 0.43 K/UL (ref 0–0.85)
MONOCYTES NFR BLD AUTO: 7.8 % (ref 0–13.4)
NEUTROPHILS # BLD AUTO: 4.17 K/UL (ref 1.82–7.42)
NEUTROPHILS NFR BLD: 76.1 % (ref 44–72)
NITRITE UR QL STRIP.AUTO: NEGATIVE
NRBC # BLD AUTO: 0 K/UL
NRBC BLD-RTO: 0 /100 WBC (ref 0–0.2)
PH UR STRIP.AUTO: 6 [PH] (ref 5–8)
PLATELET # BLD AUTO: 108 K/UL (ref 164–446)
PMV BLD AUTO: 11.2 FL (ref 9–12.9)
POTASSIUM SERPL-SCNC: 5.6 MMOL/L (ref 3.6–5.5)
PROT SERPL-MCNC: 6.8 G/DL (ref 6–8.2)
PROT UR QL STRIP: NEGATIVE MG/DL
RBC # BLD AUTO: 4.78 M/UL (ref 4.2–5.4)
RBC # URNS HPF: ABNORMAL /HPF
RBC UR QL AUTO: NEGATIVE
SODIUM SERPL-SCNC: 138 MMOL/L (ref 135–145)
SP GR UR STRIP.AUTO: 1.01
TACROLIMUS BLD-MCNC: 4.2 NG/ML (ref 5–20)
WBC # BLD AUTO: 5.5 K/UL (ref 4.8–10.8)
WBC #/AREA URNS HPF: ABNORMAL /HPF

## 2024-06-03 PROCEDURE — 80053 COMPREHEN METABOLIC PANEL: CPT

## 2024-06-03 PROCEDURE — 87799 DETECT AGENT NOS DNA QUANT: CPT

## 2024-06-03 PROCEDURE — 81001 URINALYSIS AUTO W/SCOPE: CPT

## 2024-06-03 PROCEDURE — 36415 COLL VENOUS BLD VENIPUNCTURE: CPT

## 2024-06-03 PROCEDURE — 80197 ASSAY OF TACROLIMUS: CPT

## 2024-06-03 PROCEDURE — 85025 COMPLETE CBC W/AUTO DIFF WBC: CPT

## 2024-06-05 LAB
BK PLASMA INTERP, QNT NAAT NL11711: DETECTED
BK PLASMA IU/ML, QNT NAAT NL11709: 255 IU/ML
BK PLASMA LOG IU/ML, QNT NAAT NL11710: 2.41 LOG IU/ML
BK UR INTERP, QNT NAAT NL11708: DETECTED
BK UR IU/ML, QNT NAAT NL11706: ABNORMAL IU/ML
BK UR LOG IU/ML, QNT NAAT NL11707: 5.87 LOG IU/ML

## 2024-06-12 ENCOUNTER — APPOINTMENT (OUTPATIENT)
Dept: VASCULAR LAB | Facility: MEDICAL CENTER | Age: 74
End: 2024-06-12
Attending: INTERNAL MEDICINE
Payer: MEDICARE

## 2024-06-12 ENCOUNTER — OFFICE VISIT (OUTPATIENT)
Dept: MEDICAL GROUP | Facility: MEDICAL CENTER | Age: 74
End: 2024-06-12
Payer: MEDICARE

## 2024-06-12 VITALS
WEIGHT: 131.5 LBS | TEMPERATURE: 98.1 F | HEART RATE: 46 BPM | HEIGHT: 60 IN | DIASTOLIC BLOOD PRESSURE: 52 MMHG | SYSTOLIC BLOOD PRESSURE: 182 MMHG | RESPIRATION RATE: 20 BRPM | OXYGEN SATURATION: 96 % | BODY MASS INDEX: 25.82 KG/M2

## 2024-06-12 DIAGNOSIS — N18.32 TYPE 2 DIABETES MELLITUS WITH STAGE 3B CHRONIC KIDNEY DISEASE, WITH LONG-TERM CURRENT USE OF INSULIN (HCC): ICD-10-CM

## 2024-06-12 DIAGNOSIS — Z79.4 TYPE 2 DIABETES MELLITUS WITH STAGE 3B CHRONIC KIDNEY DISEASE, WITH LONG-TERM CURRENT USE OF INSULIN (HCC): ICD-10-CM

## 2024-06-12 DIAGNOSIS — E11.22 TYPE 2 DIABETES MELLITUS WITH STAGE 3B CHRONIC KIDNEY DISEASE, WITH LONG-TERM CURRENT USE OF INSULIN (HCC): ICD-10-CM

## 2024-06-12 DIAGNOSIS — Z12.31 SCREENING MAMMOGRAM, ENCOUNTER FOR: ICD-10-CM

## 2024-06-12 DIAGNOSIS — I15.0 RENOVASCULAR HYPERTENSION: ICD-10-CM

## 2024-06-12 PROBLEM — I13.0: Status: ACTIVE | Noted: 2024-03-13

## 2024-06-12 LAB
HBA1C MFR BLD: 9.5 % (ref ?–5.8)
POCT INT CON NEG: NEGATIVE
POCT INT CON POS: POSITIVE

## 2024-06-12 PROCEDURE — 3077F SYST BP >= 140 MM HG: CPT | Performed by: NURSE PRACTITIONER

## 2024-06-12 PROCEDURE — 3078F DIAST BP <80 MM HG: CPT | Performed by: NURSE PRACTITIONER

## 2024-06-12 PROCEDURE — 83036 HEMOGLOBIN GLYCOSYLATED A1C: CPT | Performed by: NURSE PRACTITIONER

## 2024-06-12 PROCEDURE — 99214 OFFICE O/P EST MOD 30 MIN: CPT | Performed by: NURSE PRACTITIONER

## 2024-06-12 RX ORDER — AMLODIPINE BESYLATE 10 MG/1
10 TABLET ORAL
Qty: 90 TABLET | Refills: 3 | Status: SHIPPED | OUTPATIENT
Start: 2024-06-12

## 2024-06-12 ASSESSMENT — FIBROSIS 4 INDEX: FIB4 SCORE: 2.33

## 2024-06-12 NOTE — PROGRESS NOTES
Subjective:     History of Present Illness  The patient presents for a 3-month follow-up. She is accompanied by her adult son and a  is being used.     The patient reports experiencing fatigue throughout her body, including her legs and eyes. She also experiences balance issues upon standing, although she remains symptom-free when seated. She has not experienced a fall.    The patient has been adhering to her prescribed medication regimen. She recently consulted her cardiologist, who ordered chest x-rays and diagnosed her with pulmonary edema. She was prescribed medication for the fluid (furosemide), which she takes daily. She is scheduled for a follow-up with her nephrologist in 2 days, recently completed labs. She monitors her blood pressure at home, but is uncertain of the readings. She is taking losartan for her blood pressure. Denies use of amlodipine, she is unaware of this medication and does not have it with her today (has been on this medication for several years).     The patient has a scheduled appointment with her endocrinologist on 09/18/2024. During her last visit, she was advised to continue taking 5 units of insulin before meals and at night. However, when her blood glucose levels are elevated. Her blood glucose levels have been consistently high, ranging from 300 to 400. She monitors her blood glucose levels throughout the day. If blood sugar is >400 she takes 8 or 10 units of insulin.      Current medicines (including changes today)  Current Outpatient Medications   Medication Sig Dispense Refill    amLODIPine (NORVASC) 10 MG Tab Take 1 Tablet by mouth every day. 90 Tablet 3    furosemide (LASIX) 40 MG Tab Take 1 Tablet by mouth every day. 30 Tablet 1    metoprolol SR (TOPROL XL) 25 MG TABLET SR 24 HR TOME FERNANDO TABLETA TODOS LOS MCCORMICK 90 Tablet 3    levothyroxine (SYNTHROID) 75 MCG Tab TAKE 1 TABLET BY MOUTH EVERY DAY IN THE MORNING ON AN EMPTY STOMACH 90 Tablet 4    apixaban  (ELIQUIS) 5mg Tab Take 1 Tablet by mouth 2 times a day. 180 Tablet 1    glucose blood (ACCU-CHEK GUIDE) strip USE ONE COVERED STRIP TO TEST BLOOD SUGAR THREE TIMES DAILY. 100 Strip 3    Lancets Use one covered lancet to test blood sugar three times daily. 100 Each 3    triamcinolone acetonide (KENALOG) 0.1 % Cream Aplique bernardo cantidad del tamano de un guisante sobre la piel bernardo vez al fernanda janna sea necesario para la picazon o el sarpullido. 45 g 2    losartan (COZAAR) 50 MG Tab Take 1 Tablet by mouth every day. 90 Tablet 3    atorvastatin (LIPITOR) 20 MG Tab TAKE 1 TABLET BY MOUTH EVERY DAY IN THE EVENING 90 Tablet 3    docusate sodium (COLACE) 100 MG Cap Take 1 Capsule by mouth 2 times a day as needed for Constipation (Constipation). 180 Capsule 3    sodium bicarbonate (SODIUM BICARBONATE) 650 MG Tab Take 1 Tablet by mouth 2 times a day. 180 Tablet 3    amiodarone (CORDARONE) 200 MG Tab Take 1 Tablet by mouth every day. 100 Tablet 3    tacrolimus (PROGRAF) 1 MG Cap Take 2 Capsules by mouth 2 times a day. 120 Capsule 0    mycophenolate (CELLCEPT) 250 MG Cap Take 1 Capsule by mouth 2 times a day. 20 Capsule 0    insulin aspart (NOVOLOG FLEXPEN) 100 UNIT/ML injection PEN Inject 0-12 Units under the skin 4 Times a Day,Before Meals and at Bedtime. Unspecified sliding scale.      predniSONE (DELTASONE) 5 MG Tab Take 5 mg by mouth every day.       No current facility-administered medications for this visit.     She  has a past medical history of Acquired hypothyroidism (05/04/2020), CAD (coronary artery disease), Chronic diastolic heart failure (HCC) (05/04/2020), Coronary artery disease due to lipid rich plaque, Dental disorder, Diabetes ([facility]), ESRD (end stage renal disease) on dialysis ([facility]) (05/04/2020), Hemodialysis patient ([facility]), Hyperlipidemia, Hypertension, Kidney transplant candidate, Kidney transplant recipient (10/31/2022), Presence of drug-eluting stent in right coronary artery, QT prolongation (01/22/2020),  RLS (restless legs syndrome) (08/05/2016), and Transaminitis (12/22/2018).    ROS   No chest pain, no shortness of breath, no abdominal pain  Positive ROS as per HPI.  All other systems reviewed and are negative.     Objective:     BP (!) 182/52 (BP Location: Right arm, Patient Position: Sitting, BP Cuff Size: Adult)   Pulse (!) 46   Temp 36.7 °C (98.1 °F) (Temporal)   Resp 20   Ht 1.524 m (5')   Wt 59.6 kg (131 lb 8 oz)   SpO2 96%  Body mass index is 25.68 kg/m².   Physical Exam    Constitutional: Alert, no distress.  Skin: Warm, dry, good turgor, no rashes in visible areas.  Eye: Equal, round and reactive, conjunctiva clear, lids normal.  ENMT: Lips without lesions, good dentition  Respiratory: Unlabored respiratory effort  Cardiovascular: Normal S1, S2, no murmur, no edema.  Psych: Alert and oriented x3, normal affect and mood.      Results  Laboratory Studies  A1c is 9.5.        Assessment and Plan:   The following treatment plan was discussed    Assessment & Plan  1. Renovascular hypertension.  The patient's hypertension is currently unstable. It is plausible that her uncontrolled hypertension and diabetes are contributing to her feelings of unwellness. The patient brought all her medications to the office today, but did not have her amlodipine 10 mg tablets. She will maintain her current regimen of losartan 50 mg daily and metoprolol 25 mg daily. A new prescription for amlodipine 10 mg daily has been issued. She has been advised to collect this prescription and commence it as soon as possible. Additionally, she has recently commenced furosemide 40 mg daily, as prescribed by cardiology.    2. Type 2 diabetes.  The patient's condition remains unstable. Her A1c has significantly worsened at 9.5. A staff message has been sent to Dr. Ly and Jc CHAPA, endocrinology, for recommendations on insulin and medication adjustments. Asked that they contact patient with changes.     Follow-up  The patient is  scheduled for a follow-up visit in 2 months to monitor her blood pressure and blood sugar levels.      ORDERS:  1. Renovascular hypertension  - amLODIPine (NORVASC) 10 MG Tab; Take 1 Tablet by mouth every day.  Dispense: 90 Tablet; Refill: 3    2. Type 2 diabetes mellitus with stage 3b chronic kidney disease, with long-term current use of insulin (HCC)  - POCT Hemoglobin A1C    3. Screening mammogram, encounter for  - MA-SCREENING MAMMO BILAT W/TOMOSYNTHESIS W/CAD; Future          The MA is performing the above orders under the direction of Dr. Priest    Please note that this dictation was created using voice recognition software. I have made every reasonable attempt to correct obvious errors, but I expect that there are errors of grammar and possibly content that I did not discover before finalizing the note.      Attestation      Verbal consent was acquired by the patient to use [email or web address] ambient listening note generation during this visit Yes

## 2024-06-13 ENCOUNTER — PATIENT OUTREACH (OUTPATIENT)
Dept: HEALTH INFORMATION MANAGEMENT | Facility: OTHER | Age: 74
End: 2024-06-13
Payer: MEDICARE

## 2024-06-13 ENCOUNTER — TELEPHONE (OUTPATIENT)
Dept: ENDOCRINOLOGY | Facility: MEDICAL CENTER | Age: 74
End: 2024-06-13
Payer: MEDICARE

## 2024-06-13 ENCOUNTER — TELEPHONE (OUTPATIENT)
Dept: MEDICAL GROUP | Facility: MEDICAL CENTER | Age: 74
End: 2024-06-13
Payer: MEDICARE

## 2024-06-13 DIAGNOSIS — H91.90 HEARING LOSS, UNSPECIFIED HEARING LOSS TYPE, UNSPECIFIED LATERALITY: ICD-10-CM

## 2024-06-13 DIAGNOSIS — R42 DIZZINESS: ICD-10-CM

## 2024-06-13 NOTE — TELEPHONE ENCOUNTER
----- Message from Maren Rogers R.N. sent at 6/12/2024  9:35 AM PDT -----  Regarding: ENT referral  Iker Chavez,    I just spoke with Tere's granddaughter who inquired about an ENT referral for the continued dizziness she is experiencing. Tere had an audiology consult and it was found that she has significantly reduced hearing in one ear. The provider recommended an ENT consult to assess for inner ear issues prior to ordering hearing aids. Do you mind placing this referral? I appreciate you time. Please reach out with any questions. Thank you.    SCOTTY SteelN, RN  RN Care Coordinator  [facility]  [phone]

## 2024-06-13 NOTE — PROGRESS NOTES
CHW attempted to reach pt to introduce CCM services and CHW realized that pt is currently a DSNP member so pt does not qualify for CCM services at this time.

## 2024-06-14 ENCOUNTER — OFFICE VISIT (OUTPATIENT)
Dept: NEPHROLOGY | Facility: MEDICAL CENTER | Age: 74
End: 2024-06-14
Payer: MEDICARE

## 2024-06-14 VITALS
RESPIRATION RATE: 16 BRPM | DIASTOLIC BLOOD PRESSURE: 82 MMHG | OXYGEN SATURATION: 96 % | WEIGHT: 131 LBS | SYSTOLIC BLOOD PRESSURE: 124 MMHG | HEART RATE: 52 BPM | BODY MASS INDEX: 24.11 KG/M2 | HEIGHT: 62 IN | TEMPERATURE: 97.2 F

## 2024-06-14 DIAGNOSIS — I48.0 PAROXYSMAL A-FIB (HCC): ICD-10-CM

## 2024-06-14 DIAGNOSIS — Z94.0 DECEASED-DONOR KIDNEY TRANSPLANT RECIPIENT: Chronic | ICD-10-CM

## 2024-06-14 DIAGNOSIS — I48.91 ATRIAL FIBRILLATION WITH RAPID VENTRICULAR RESPONSE (HCC): ICD-10-CM

## 2024-06-14 DIAGNOSIS — Z79.4 TYPE 2 DIABETES MELLITUS WITH STAGE 3B CHRONIC KIDNEY DISEASE, WITH LONG-TERM CURRENT USE OF INSULIN (HCC): Chronic | ICD-10-CM

## 2024-06-14 DIAGNOSIS — N18.32 STAGE 3B CHRONIC KIDNEY DISEASE: ICD-10-CM

## 2024-06-14 DIAGNOSIS — E11.22 TYPE 2 DIABETES MELLITUS WITH STAGE 3B CHRONIC KIDNEY DISEASE, WITH LONG-TERM CURRENT USE OF INSULIN (HCC): Chronic | ICD-10-CM

## 2024-06-14 DIAGNOSIS — Z87.440 HISTORY OF UTI: ICD-10-CM

## 2024-06-14 DIAGNOSIS — I10 PRIMARY HYPERTENSION: Chronic | ICD-10-CM

## 2024-06-14 DIAGNOSIS — E55.9 VITAMIN D DEFICIENCY: ICD-10-CM

## 2024-06-14 DIAGNOSIS — N18.32 TYPE 2 DIABETES MELLITUS WITH STAGE 3B CHRONIC KIDNEY DISEASE, WITH LONG-TERM CURRENT USE OF INSULIN (HCC): Chronic | ICD-10-CM

## 2024-06-14 RX ORDER — METOPROLOL SUCCINATE 25 MG/1
12.5 TABLET, EXTENDED RELEASE ORAL
Qty: 45 TABLET | Refills: 3 | Status: SHIPPED | OUTPATIENT
Start: 2024-06-14

## 2024-06-14 RX ORDER — DAPAGLIFLOZIN 5 MG/1
5 TABLET, FILM COATED ORAL DAILY
Qty: 90 TABLET | Refills: 3 | Status: SHIPPED | OUTPATIENT
Start: 2024-06-14

## 2024-06-14 ASSESSMENT — ENCOUNTER SYMPTOMS
SHORTNESS OF BREATH: 0
FEVER: 0
WEAKNESS: 1
ABDOMINAL PAIN: 0

## 2024-06-14 ASSESSMENT — FIBROSIS 4 INDEX: FIB4 SCORE: 2.33

## 2024-06-14 NOTE — PROGRESS NOTES
Chief Complaint   Patient presents with    Chronic Kidney Disease    Kidney Transplant       CC:  follow-up CKD and transplant     services were used in the patient's primary language of English.   Name or Number: Toby [id number]  Mode of interpretation: iPad      HPI:  Tere Gallegos is a 73 y.o. female with a history of end-stage renal disease status post  donor kidney [place] [facility] notable for delayed graft function requiring dialysis until mid 2022, also complicated by a distal ureteral stricture requiring percutaneous transplant nephrostomy followed by ureteral stenting in May 2023 and ureteral re-implantation on 23, diabetes, hypertension, permanent atrial fibrillation, pancytopenia, BK viremia who presents today for follow-up.    Patient was hospitalized in mid May 2023.  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  She required percutaneous nephrostomy on 2023.  She was then hospitalized at [facility] in [place] in late May, 2023. She had PCN removed and ureteral stent placed.  She had ureteral reimplantation done 2023.    Patient hospitalized in early 2023 with chest pain, atrial fibrillation with RVR, improved with medical management. She was admitted in late 2023 with concern for UTI or abscess.     Re: ESRD, patient was on dialysis since .  The etiology of ESRD was thought to be due to diabetes, hypertension, and solitary kidney.  Patient was anuric prior to kidney transplant 10/31/2022.  Patient had delayed graft function and required dialysis until 2022.  Posttransplant course has been complicated by pancytopenia and BK viremia and BK viruria.   Denies taking NSAIDs. She is still taking sodium bicarb 650mg bid.     Re: immunosuppression, patient was induced with Simulect on 10/31/2022.  Patient was originally on mycophenolate 1000 mg p.o. twice daily, tacrolimus 1 mg p.o. twice daily, and  prednisone 10 mg daily shortly after transplant.  Her dosages were weaned down. She is currently taking prednisone 5mg daily, Tacrolimus 1mg in morning and 2mg at night, and MMF 250mg BID.      Re: hypertension, she denies LE edema currently.  She checks BP at home but can't remember her numbers.  She wasn't taking amlodipine, so it was prescribed again 24. She has it with her and says she is taking it now. She's also on losartan 50mg daily, Toprol-XL 25mg daily, lasix 40mg daily (and she feels diuretic effect).     Re: DM2, she remains on insulin. A1C was 9.2% in 2024.         Past Medical History:   Diagnosis Date    Acquired hypothyroidism 2020    CAD (coronary artery disease)     Chronic diastolic heart failure ([facility]) 2020    Coronary artery disease due to lipid rich plaque     2 Synergy NOEMI to 100% RCA stent placed    Dental disorder     partial dentures- uppers    Diabetes ([facility])     oral medication    ESRD (end stage renal disease) on dialysis ([facility]) 2020    Hemodialysis patient ([facility])     M, W, F    Hyperlipidemia     Hypertension     Kidney transplant candidate     Kidney transplant recipient 10/31/2022    Presence of drug-eluting stent in right coronary artery     QT prolongation 2020    RLS (restless legs syndrome) 2016    Transaminitis 2018     Past Surgical History:   Procedure Laterality Date    URETERAL REIMPLANTATION  2023    [place] [facility]    OTHER ABDOMINAL SURGERY Right 10/31/2022     Donor kidney transplant - [facility] CARDIAC CATH  2016    RCA stented with 2 Synergy drug-eluting stents.    RECOVERY  2016    Procedure: CATH LAB [facility] WITH POSSIBLE DR. CASTILLO;  Surgeon: [facility];  Location: SURGERY PRE-POST PROC UNIT [facility];  Service:     ZZZ CARDIAC CATH  2016    100% RCA    OTHER Left     left arm upper extremity fistula    OTHER ABDOMINAL SURGERY      left  kidney removed due to cancer         Outpatient Encounter Medications as of 6/14/2024   Medication Sig Dispense Refill    FARXIGA 5 MG Tab Take 1 Tablet by mouth every day. Por diabetes 90 Tablet 3    amLODIPine (NORVASC) 10 MG Tab Take 1 Tablet by mouth every day. 90 Tablet 3    furosemide (LASIX) 40 MG Tab Take 1 Tablet by mouth every day. 30 Tablet 1    metoprolol SR (TOPROL XL) 25 MG TABLET SR 24 HR TOME FERNANDO TABLETA TODOS LOS MCCORMICK 90 Tablet 3    levothyroxine (SYNTHROID) 75 MCG Tab TAKE 1 TABLET BY MOUTH EVERY DAY IN THE MORNING ON AN EMPTY STOMACH 90 Tablet 4    apixaban (ELIQUIS) 5mg Tab Take 1 Tablet by mouth 2 times a day. 180 Tablet 1    glucose blood (ACCU-CHEK GUIDE) strip USE ONE COVERED STRIP TO TEST BLOOD SUGAR THREE TIMES DAILY. 100 Strip 3    Lancets Use one covered lancet to test blood sugar three times daily. 100 Each 3    triamcinolone acetonide (KENALOG) 0.1 % Cream Aplique fernando cantidad del tamano de un guisante sobre la piel fernando vez al fernanda janna sea necesario para la picazon o el sarpullido. 45 g 2    losartan (COZAAR) 50 MG Tab Take 1 Tablet by mouth every day. 90 Tablet 3    atorvastatin (LIPITOR) 20 MG Tab TAKE 1 TABLET BY MOUTH EVERY DAY IN THE EVENING 90 Tablet 3    docusate sodium (COLACE) 100 MG Cap Take 1 Capsule by mouth 2 times a day as needed for Constipation (Constipation). 180 Capsule 3    sodium bicarbonate (SODIUM BICARBONATE) 650 MG Tab Take 1 Tablet by mouth 2 times a day. 180 Tablet 3    amiodarone (CORDARONE) 200 MG Tab Take 1 Tablet by mouth every day. 100 Tablet 3    tacrolimus (PROGRAF) 1 MG Cap Take 2 Capsules by mouth 2 times a day. 120 Capsule 0    mycophenolate (CELLCEPT) 250 MG Cap Take 1 Capsule by mouth 2 times a day. 20 Capsule 0    insulin aspart (NOVOLOG FLEXPEN) 100 UNIT/ML injection PEN Inject 0-12 Units under the skin 4 Times a Day,Before Meals and at Bedtime. Unspecified sliding scale.      predniSONE (DELTASONE) 5 MG Tab Take 5 mg by mouth every day.       "[DISCONTINUED] amLODIPine (NORVASC) 10 MG Tab TOME FERNANDO TABLETA TODOS LOS MCCORMICK 90 Tablet 1     No facility-administered encounter medications on file as of 6/14/2024.        No Known Allergies    Review of Systems   Constitutional:  Positive for malaise/fatigue. Negative for fever.   Respiratory:  Negative for shortness of breath.    Cardiovascular:  Negative for chest pain.   Gastrointestinal:  Negative for abdominal pain.   Genitourinary:  Negative for dysuria.   Neurological:  Positive for weakness.   All other systems reviewed and are negative.      /82 (BP Location: Right arm, Patient Position: Sitting, BP Cuff Size: Adult)   Pulse (!) 52   Temp 36.2 °C (97.2 °F) (Temporal)   Resp 16   Ht 1.575 m (5' 2\")   Wt 59.4 kg (131 lb)   LMP  (LMP Unknown)   SpO2 96%   BMI 23.96 kg/m²     Physical Exam  Constitutional:       General: She is not in acute distress.  HENT:      Mouth/Throat:      Pharynx: No oropharyngeal exudate.   Eyes:      General: No scleral icterus.  Neck:      Trachea: No tracheal deviation.   Cardiovascular:      Rate and Rhythm: Bradycardia present. Rhythm irregular.      Heart sounds: Normal heart sounds. No murmur heard.  Pulmonary:      Effort: Pulmonary effort is normal.      Breath sounds: Normal breath sounds. No stridor. No rales.   Abdominal:      General: Bowel sounds are normal.      Palpations: Abdomen is soft.      Tenderness: There is no abdominal tenderness.   Musculoskeletal:         General: Normal range of motion.      Cervical back: Neck supple.      Right lower leg: No edema.      Left lower leg: No edema.   Skin:     General: Skin is warm and dry.      Findings: No rash.   Neurological:      General: No focal deficit present.      Mental Status: She is alert and oriented to person, place, and time.   Psychiatric:         Mood and Affect: Mood and affect normal.         Behavior: Behavior normal.   Dialysis access: Left brachiobasilic AV fistula, with patent bruit " and thrill.         Labs reviewed.  Recent Labs     23  0636 24  0655 24  0932 24  0724 24  0704 24  0709   ALBUMIN  --    < > 4.5  --  3.8 3.9   SODIUM 138   < > 138 138 137 138   POTASSIUM 4.4   < > 4.5 5.5 5.5 5.6*   CHLORIDE 105   < > 104 106 106 105   CO2 23   < > 20 25 23 21   BUN 27*   < > 34* 31* 28* 41*   CREATININE 1.23   < > 1.63* 1.55* 1.34 1.55*   PHOSPHORUS 3.0  3.0  --   --  3.1  --   --     < > = values in this interval not displayed.       Lab Results   Component Value Date/Time    WBC 5.5 2024 07:09 AM    RBC 4.78 2024 07:09 AM    HEMOGLOBIN 13.3 2024 07:09 AM    HEMATOCRIT 42.4 2024 07:09 AM    MCV 88.7 2024 07:09 AM    MCH 27.8 2024 07:09 AM    MCHC 31.4 (L) 2024 07:09 AM    MPV 11.2 2024 07:09 AM                  URINALYSIS:  Lab Results   Component Value Date/Time    COLORURINE Yellow 2024 0709    CLARITY Hazy (A) 2024 0709    SPECGRAVITY 1.015 2024 0709    PHURINE 6.0 2024 0709    KETONES Negative 2024 0709    PROTEINURIN Negative 2024 0709    BILIRUBINUR Negative 2024 0709    UROBILU 0.2 2023 2235    NITRITE Negative 2024 0709    LEUKESTERAS Trace (A) 2024 0709    OCCULTBLOOD Negative 2024 0709       UPC  Lab Results   Component Value Date/Time    TOTPROTUR 16.0 (H) 2023 0707      Lab Results   Component Value Date/Time    CREATININEU 81.98 2023 0707             Imaging report(s) reviewed  No orders to display         Assessment:  Tere Gallegos is a 73 y.o. female with a history of end-stage renal disease status post  donor kidney transplant 10/31/2022 at [facility] notable for delayed graft function requiring dialysis until mid 2022, also complicated by a distal ureteral stricture requiring percutaneous transplant nephrostomy followed by ureteral stenting in May 2023 and ureteral re-implantation on  8/7/23, diabetes, hypertension, permanent atrial fibrillation, pancytopenia, BK viremia who presents today for follow-up.    Plan:  1. ESRD s/p kidney transplant 10/31/22  -Original ESRD diagnosis from solitary kidney, diabetes, hypertension.  Started hemodialysis in 2014.  Patient remains with functioning kidney transplant.  She remains off of dialysis since November 18, 2022.      2.  transplant stage 3a-3b chronic kidney disease (HCC)  -Creatinine and GFR currently stable within stage IIIb CKD.  I recommend avoiding NSAIDs and other nephrotoxins.  I recommend a low-sodium diet.    3.  Hypertension  - Much better controlled today than previously.  With bradycardia, I do recommend decreasing metoprolol from 25 to 12.5 mg p.o. daily.  Continue amlodipine 10 mg daily, losartan 50 mg daily, Lasix 40 mg daily, metoprolol 12.5 mg daily.      4. Permanent atrial fibrillation (HCC)  -Controlled with metoprolol, amiodarone, apixaban.  Continue.    5. Long term current use of immunosuppressive drug complicated by BK viremia  -Continue tacrolimus 1 mg in the morning and 2 mg in the evening, mycophenolate 250 mg p.o. twice daily, and prednisone 5 mg p.o. once daily.  She does have a history of BK viremia and viruria.    6. Pancytopenia (HCC)  -Remarkably somewhat improved.  She now simply has thrombocytopenia, but it is mild.  Continue mycophenolate 250 mg p.o. twice daily.  She had known pancytopenia even prior to transplant.    7.  ESBL E. coli UTI  - Noted historically.  Patient without dysuria at this time.    8.  Hyperkalemia  - Very mild.  If worsens, I would recommend increasing Lasix from 40 mg to 80 mg daily.    9.  Metabolic acidosis  - Fairly well-controlled.  Continue sodium bicarbonate 650 mg p.o. twice daily.    10.  Type 2 diabetes  - Uncontrolled, with hyperglycemia.  I recommend starting Farxiga 5 mg daily if affordable.      Return to clinic in 6 months with pre-clinic labs, and continue to bring home  medications and blood pressure log, and ideally family member that knows her medications.      Priyank Villar MD  Nephrology  [facility]

## 2024-06-17 ENCOUNTER — ANTICOAGULATION VISIT (OUTPATIENT)
Dept: VASCULAR LAB | Facility: MEDICAL CENTER | Age: 74
End: 2024-06-17
Attending: INTERNAL MEDICINE
Payer: MEDICARE

## 2024-06-17 ENCOUNTER — APPOINTMENT (OUTPATIENT)
Dept: ENDOCRINOLOGY | Facility: MEDICAL CENTER | Age: 74
End: 2024-06-17
Payer: MEDICARE

## 2024-06-17 VITALS
HEART RATE: 46 BPM | DIASTOLIC BLOOD PRESSURE: 55 MMHG | WEIGHT: 130 LBS | BODY MASS INDEX: 23.78 KG/M2 | SYSTOLIC BLOOD PRESSURE: 148 MMHG

## 2024-06-17 DIAGNOSIS — D68.69 SECONDARY HYPERCOAGULABLE STATE (HCC): ICD-10-CM

## 2024-06-17 DIAGNOSIS — I48.91 ATRIAL FIBRILLATION, UNSPECIFIED TYPE (HCC): Chronic | ICD-10-CM

## 2024-06-17 PROCEDURE — 99213 OFFICE O/P EST LOW 20 MIN: CPT

## 2024-06-17 ASSESSMENT — FIBROSIS 4 INDEX: FIB4 SCORE: 2.33

## 2024-06-17 NOTE — PROGRESS NOTES
Target end date: Indefinite     Indication: AFib     Drug: Eliquis 5 mg BID     CHADsVASC = at least 5 (Age, sex, HTN, DM, CHF)    Utilized translation services for this encounter.  Language Line - [phone]  [employer] ID - [id number]   Claudio, ID # [id number]    Health Status Since Last Assessment   Patient denies any new relevant medical problems, ED visits or hospitalizations   Patient denies any embolic events (stroke/tia/systemic embolism)    Adherence with DOAC Therapy   Pt has NO missed doses in the average week    Bleeding Risk Assessment   Denies Epistaxis   Pt denies any excessive or unusual bleeding/hematomas.  Pt denies any GI bleeds or hematemesis.  Pt denies any concerning daily headache or sub dural hematoma symptoms.   Pt denies any hematuria    Latest Hemoglobin: 13.3   Platelets: 108 - chronic thrombocytopenia (pt on mycophenolate & tacrolimus). Stable for pt.   ETOH overuse - Denies     Creatinine Clearance/Renal Function   Latest CrCl ~ 25 mL/min    Hepatic function   Latest LFTs WNL   Pt denies any history of liver dysfunction      Drug Interactions   ASA/other antiplatelets - None   NSAID - Avoids   Other drug interactions - No significant DDI noted   x Verified no anticonvulsant or azole therapy, education provided for future use.     Examination   Blood Pressure - Recorded in vitals   Symptomatic hypotension - Denies   Significant gait impairment/imbalance/high risk for falls? No obvious impairments    Final Assessment and Recommendations:   Patient appears stable from the anticoagulation standpoint.     Benefits of continued DOAC therapy outweigh risks for this patient   Recommend pt continue with DOAC therapy, but transition to Eliquis 2.5 mg BID (with dose adjustment d/t SrCr > 1.5 & Wt < 60 kg).   DOAC is affordable     Other Actions: cmp/ cbc hemogram ordered prior to next visit    Follow up:   Will follow up with patient 3 months.     Of note, a significant portion of the  visit today was spent doing med rec and answering pt's questions about her other medications. She is very unaware of the indications for her medications - counseled her as time allowed during our appt. Counseled pt and her daughter extensively to ensure they p/u new Eliquis Rx and decrease her dose ASAP.    Casper Matias, DominiqueD, BCACP

## 2024-06-19 ENCOUNTER — OFFICE VISIT (OUTPATIENT)
Dept: ENDOCRINOLOGY | Facility: MEDICAL CENTER | Age: 74
End: 2024-06-19
Payer: MEDICARE

## 2024-06-19 VITALS
HEART RATE: 66 BPM | SYSTOLIC BLOOD PRESSURE: 170 MMHG | WEIGHT: 130 LBS | HEIGHT: 62 IN | BODY MASS INDEX: 23.92 KG/M2 | DIASTOLIC BLOOD PRESSURE: 57 MMHG | OXYGEN SATURATION: 98 % | RESPIRATION RATE: 16 BRPM

## 2024-06-19 DIAGNOSIS — N25.81 SECONDARY HYPERPARATHYROIDISM (HCC): ICD-10-CM

## 2024-06-19 DIAGNOSIS — N18.6 ESRD (END STAGE RENAL DISEASE) (HCC): ICD-10-CM

## 2024-06-19 DIAGNOSIS — E78.5 DYSLIPIDEMIA: ICD-10-CM

## 2024-06-19 DIAGNOSIS — I25.10 CARDIOVASCULAR DISEASE: ICD-10-CM

## 2024-06-19 DIAGNOSIS — I10 ESSENTIAL HYPERTENSION: ICD-10-CM

## 2024-06-19 DIAGNOSIS — Z79.4 TYPE 2 DIABETES MELLITUS WITH OTHER SPECIFIED COMPLICATION, WITH LONG-TERM CURRENT USE OF INSULIN (HCC): ICD-10-CM

## 2024-06-19 DIAGNOSIS — E03.9 HYPOTHYROIDISM, ACQUIRED: ICD-10-CM

## 2024-06-19 DIAGNOSIS — E11.69 TYPE 2 DIABETES MELLITUS WITH OTHER SPECIFIED COMPLICATION, WITH LONG-TERM CURRENT USE OF INSULIN (HCC): ICD-10-CM

## 2024-06-19 DIAGNOSIS — E55.9 VITAMIN D DEFICIENCY: ICD-10-CM

## 2024-06-19 DIAGNOSIS — G62.9 POLYNEUROPATHY: ICD-10-CM

## 2024-06-19 PROCEDURE — 99215 OFFICE O/P EST HI 40 MIN: CPT

## 2024-06-19 PROCEDURE — 3077F SYST BP >= 140 MM HG: CPT

## 2024-06-19 PROCEDURE — 99212 OFFICE O/P EST SF 10 MIN: CPT

## 2024-06-19 PROCEDURE — 3078F DIAST BP <80 MM HG: CPT

## 2024-06-19 RX ORDER — FUROSEMIDE 40 MG/1
40 TABLET ORAL DAILY
Qty: 90 TABLET | Refills: 2 | Status: SHIPPED | OUTPATIENT
Start: 2024-06-19

## 2024-06-19 ASSESSMENT — FIBROSIS 4 INDEX: FIB4 SCORE: 2.33

## 2024-06-19 NOTE — PROGRESS NOTES
"Chief Complaint: Follow-up on the following conditions  HPI:   Tere Gallegos is a 73 y.o. female    1.  Type 2 diabetes mellitus with hyperglycemia:   Type 2 Diabetes Mellitus diagnosed 20 years ago.      She checks her blood sugars 3x/day  Fasting blood sugars-140  After meals 400    POC A1c on 6/12/24 was 9.5  POC A1c on 2/29/2024 at 7.2%  POC a1c on 6/7/2023 at 6.2%  POC a1c on 02/18/2023 6.0%  POC a1c on 1/03/2023 at 5.8%  Last A1C on 11/8/21 at 5.7%, she was a dialysis patient which makes A1c unreliable.    Diabetes management:  Lantus 5 units in am -15  Novolg 5 units if BG >200 - 10 units    Farxiga 5 mg daily    She denies hypoglycemic episodes.      Diet: \"healthy\" diet  in general  Exercise: minimal     Diabetes Complications   She  reports history of mild proliferative diabetic retinopathy.    She denies laser eye surgery.   Cataract surgery both eyes were done this year   Last eye exam: Dr. Larose in July appt of 2023  Opthlmology appt is on January 10th    2.  Neuropathy:   Reports numbness or tingling to her feet but reports pain occasionally she is currently taking Lyrica.  she denies history of foot sores.     3.  ESRD  Surgery in [facility] in 10/31/22   Currently taking Losartan  Dr. Villar   Latest Reference Range & Units 02/29/24 09:30   Micro Alb Creat Ratio 0 - 30 mg/g 25   Creatinine, Urine mg/dL 119.99   Microalbumin, Urine Random mg/dL 3.0      Latest Reference Range & Units 06/03/24 07:09   Bun 8 - 22 mg/dL 41 (H)   Creatinine 0.50 - 1.40 mg/dL 1.55 (H)   GFR (CKD-EPI) >60 mL/min/1.73 m 2 35 !      Latest Reference Range & Units 06/03/24 07:09   Potassium 3.6 - 5.5 mmol/L 5.6 (H)     4.  Cardiovascular disease:  Paroxysmal Atrial Fibrillation-She is taking Eliquis.   She is seeing at [facility]-Aster Santamaria     5.  Hypothyroidism, acquired:   She is currently levothyroixine 75 mcg of levothyroxine daily   She takes it every morning " on an empty stomach  Denies taking any iron, calcium, multivitamins, antiacids with the medication    Denies fatigue, constipation, dry skin, palpitations.    Latest Reference Range & Units 09/05/23 07:48   TSH 0.380 - 5.330 uIU/mL 2.780     6. Essential Hypertension:  FV by cardiology   Currently taking Norvasc 10 mg, carvedilol 25 mg, lisinopril 40 mg  BP in office today was 174/72   She has not taken her medications.   denies any dizziness, palpitations, chest pain    7.  Dyslipidemia:  She is currently taking statin-atorvastatin 20 mg daily   She still complaining of muscle aches especially in her lower legs   Latest Reference Range & Units 02/18/23 04:17   Cholesterol,Tot 100 - 199 mg/dL 97 (L)   Triglycerides 0 - 149 mg/dL 144   HDL >=40 mg/dL 31 !   LDL <100 mg/dL 37       8. Secondary hyperparathyroidism:  Currently not taking any vitamin D  Fv by Dr. Blackmon    Latest Reference Range & Units 09/14/23 07:06   25-Hydroxy   Vitamin D 25 30 - 100 ng/mL 14 (L)      Latest Reference Range & Units 09/14/23 07:06   Pth, Intact 14.0 - 72.0 pg/mL 103.0 (H)      Latest Reference Range & Units 06/03/24 07:09   Calcium 8.4 - 10.2 mg/dL 10.2   Correct Calcium 8.5 - 10.5 mg/dL 10.3     ROS:     CONS:     No fever, no chills, no weight loss, no fatigue   EYES:      No diplopia, no blurry vision, no redness of eyes, no swelling of eyelids   ENT:    No hearing loss, No ear pain, No sore throat, no dysphagia, no neck swelling   CV:     No chest pain, no palpitations, no claudication, no orthopnea, no PND   PULM:    No SOB, no cough, no hemoptysis, no wheezing    GI:   No nausea, no vomiting, no diarrhea, no constipation, no bloody stools   :  Passing urine well, no dysuria, no hematuria   ENDO:   No polyuria, no polydipsia, no heat intolerance, no cold intolerance   NEURO: No headaches, no dizziness, no convulsions, no tremors   MUSC:  No joint swellings, no arthralgias, no myalgias, no weakness   SKIN:   No rash, no ulcers,  no dry skin   PSYCH:   No depression, no anxiety, no difficulty sleeping       Past Medical History:  Patient Active Problem List    Diagnosis Date Noted    Hypertensive heart disease with heart failure and stage 3b chronic kidney disease ([facility]) 2024    Polyneuropathy in diseases classified elsewhere ([facility]) 2024    Immunosuppression due to drug therapy ([facility]) 12/15/2023    Infection due to ESBL-producing Escherichia coli 2023    Metabolic acidosis 2023    Bradycardia 2023    Low bicarbonate level 2023    BK viremia 2023    Itching 2023    Other hydronephrosis 2023    Long term current use of immunosuppressive drug 2023    Leg swelling 2023    Multifocal pneumonia 2023    Stage 3b chronic kidney disease 2023    Drug-induced constipation 2023    Long-term insulin use ([facility]) 2022    -donor kidney transplant recipient 2022    Lung nodules 10/22/2022    GERD (gastroesophageal reflux disease) 10/19/2022    Normocytic anemia 2022    Secondary hypercoagulable state ([facility]) 2022    Atrial fibrillation ([facility]) 2021    Leukopenia 2021    Chronic heart failure with preserved ejection fraction ([facility]) 2020    Hypothyroidism 2020    Dental disorder 2020    Coronary artery disease with angina pectoris with documented spasm ([facility])     Mixed hyperlipidemia 2019    Elevated troponin 2018    RLS (restless legs syndrome) 2016    Type 2 diabetes mellitus with stage 3b chronic kidney disease, with long-term current use of insulin ([facility]) 2016    Primary hypertension 2013       Past Surgical History:  Past Surgical History:   Procedure Laterality Date    URETERAL REIMPLANTATION  2023    transplant ureter reimplantation - [facility] [place]    OTHER ABDOMINAL SURGERY Right 10/31/2022     Donor kidney transplant - [facility]    ZZZ CARDIAC CATH  2016    RCA  stented with 2 Synergy drug-eluting stents.    RECOVERY  08/16/2016    Procedure: CATH LAB [facility] WITH POSSIBLE DR. CASTILLO;  Surgeon: [facility];  Location: SURGERY PRE-POST PROC UNIT [facility];  Service:     Z CARDIAC CATH  08/16/2016    100% RCA    OTHER Left 2014    left arm upper extremity fistula    OTHER ABDOMINAL SURGERY      left kidney removed due to cancer        Allergies:  Patient has no known allergies.     Current Medications:    Current Outpatient Medications:     apixaban (ELIQUIS) 2.5mg Tab, Take 1 Tablet by mouth 2 times a day., Disp: 60 Tablet, Rfl: 5    FARXIGA 5 MG Tab, Take 1 Tablet by mouth every day. Por diabetes, Disp: 90 Tablet, Rfl: 3    metoprolol SR (TOPROL XL) 25 MG TABLET SR 24 HR, Take 0.5 Tablets by mouth every day., Disp: 45 Tablet, Rfl: 3    amLODIPine (NORVASC) 10 MG Tab, Take 1 Tablet by mouth every day., Disp: 90 Tablet, Rfl: 3    furosemide (LASIX) 40 MG Tab, Take 1 Tablet by mouth every day., Disp: 30 Tablet, Rfl: 1    levothyroxine (SYNTHROID) 75 MCG Tab, TAKE 1 TABLET BY MOUTH EVERY DAY IN THE MORNING ON AN EMPTY STOMACH, Disp: 90 Tablet, Rfl: 4    glucose blood (ACCU-CHEK GUIDE) strip, USE ONE COVERED STRIP TO TEST BLOOD SUGAR THREE TIMES DAILY., Disp: 100 Strip, Rfl: 3    Lancets, Use one covered lancet to test blood sugar three times daily., Disp: 100 Each, Rfl: 3    triamcinolone acetonide (KENALOG) 0.1 % Cream, Aplique bernardo cantidad del tamano de un guisante sobre la piel bernardo vez al fernanda janna sea necesario para la picazon o el sarpullido., Disp: 45 g, Rfl: 2    losartan (COZAAR) 50 MG Tab, Take 1 Tablet by mouth every day., Disp: 90 Tablet, Rfl: 3    atorvastatin (LIPITOR) 20 MG Tab, TAKE 1 TABLET BY MOUTH EVERY DAY IN THE EVENING, Disp: 90 Tablet, Rfl: 3    docusate sodium (COLACE) 100 MG Cap, Take 1 Capsule by mouth 2 times a day as needed for Constipation (Constipation)., Disp: 180 Capsule, Rfl: 3    sodium bicarbonate (SODIUM BICARBONATE) 650 MG Tab, Take 1  Tablet by mouth 2 times a day., Disp: 180 Tablet, Rfl: 3    amiodarone (CORDARONE) 200 MG Tab, Take 1 Tablet by mouth every day., Disp: 100 Tablet, Rfl: 3    tacrolimus (PROGRAF) 1 MG Cap, Take 2 Capsules by mouth 2 times a day., Disp: 120 Capsule, Rfl: 0    mycophenolate (CELLCEPT) 250 MG Cap, Take 1 Capsule by mouth 2 times a day., Disp: 20 Capsule, Rfl: 0    insulin aspart (NOVOLOG FLEXPEN) 100 UNIT/ML injection PEN, Inject 0-12 Units under the skin 4 Times a Day,Before Meals and at Bedtime. Unspecified sliding scale., Disp: , Rfl:     predniSONE (DELTASONE) 5 MG Tab, Take 5 mg by mouth every day., Disp: , Rfl:     Social History:  Social History     Socioeconomic History    Marital status:      Spouse name: Not on file    Number of children: Not on file    Years of education: Not on file    Highest education level: Not on file   Occupational History    Not on file   Tobacco Use    Smoking status: Never    Smokeless tobacco: Never   Vaping Use    Vaping status: Never Used   Substance and Sexual Activity    Alcohol use: No     Alcohol/week: 0.0 oz    Drug use: No    Sexual activity: Not Currently   Other Topics Concern    Not on file   Social History Narrative    Not on file     Social Determinants of Health     Financial Resource Strain: Low Risk  (4/9/2024)    Overall Financial Resource Strain (CARDIA)     Difficulty of Paying Living Expenses: Not very hard   Food Insecurity: No Food Insecurity (4/9/2024)    Hunger Vital Sign     Worried About Running Out of Food in the Last Year: Never true     Ran Out of Food in the Last Year: Never true   Transportation Needs: No Transportation Needs (4/9/2024)    PRAPARE - Transportation     Lack of Transportation (Medical): No     Lack of Transportation (Non-Medical): No   Physical Activity: Inactive (4/9/2024)    Exercise Vital Sign     Days of Exercise per Week: 0 days     Minutes of Exercise per Session: 0 min   Stress: No Stress Concern Present (4/9/2024)     [facility] of Occupational Health - Occupational Stress Questionnaire     Feeling of Stress : Only a little   Social Connections: Moderately Isolated (4/9/2024)    Social Connection and Isolation Panel [NHANES]     Frequency of Communication with Friends and Family: More than three times a week     Frequency of Social Gatherings with Friends and Family: More than three times a week     Attends [employer] Services: Never     Active Member of Clubs or Organizations: No     Attends Club or Organization Meetings: Never     Marital Status:    Intimate Partner Violence: Not At Risk (4/9/2024)    Humiliation, Afraid, Rape, and Kick questionnaire     Fear of Current or Ex-Partner: No     Emotionally Abused: No     Physically Abused: No     Sexually Abused: No   Housing Stability: Low Risk  (4/9/2024)    Housing Stability Vital Sign     Unable to Pay for Housing in the Last Year: No     Number of Places Lived in the Last Year: 1     Unstable Housing in the Last Year: No        Family History:   Family History   Problem Relation Age of Onset    Diabetes Sister     Other Sister         liver disease    Diabetes Brother     Heart Disease Neg Hx        PHYSICAL EXAM:   Vital signs: LMP  (LMP Unknown)   GENERAL: Well-developed, well-nourished  in no apparent distress.   FOOT: Normal sensation to monofilament testing, normal pulses, no ulcers.  Normal Vibration quantitative sensation test.    Labs:  Lab Results   Component Value Date/Time    HBA1C 5.7 (A) 12/08/2021 1520    AVGLUC 111 04/28/2021 0937     Lab Results   Component Value Date/Time    CHOLSTRLTOT 125 09/29/2020 1056    TRIGLYCERIDE 113 09/29/2020 1056    HDL 48 09/29/2020 1056    LDL 54 09/29/2020 1056       Lab Results   Component Value Date/Time    MALBCRT 25 02/29/2024 09:30 AM    MICROALBUR 3.0 02/29/2024 09:30 AM        Lab Results   Component Value Date/Time    TSHULTRASEN 4.150 08/12/2021 0201     Lab Results   Component Value Date/Time    FREEDIR  1.52 01/28/2021 0700         ASSESSMENT/PLAN:   1. Type 2 diabetes mellitus with other specified complication, with long-term current use of insulin (HCC)  Unstable with an A1c of 9.5%  - Exercise for least 150 minutes/week  - Stable hydration  - Daily feet check  - Maintain a healthy diet, minimal carbohydrate, portion control   --Pt did not do blood work prior to this appt     Diabetes management:  Lantus 5 units in am- change to 15 units in the am   Novolg 5 units/before meals  - change to novolog 10 units with each meal  - Comp Metabolic Panel; Future    2. Polyneuropathy  Stable at this time  Continue appointment-HPI    3. Dyslipidemia  Unstable  Continue regimen-HPI  - Lipid Profile; Future    4. ESRD (end stage renal disease) (HCC)  Unstable  Followed by nephrology    5. Essential hypertension  Stable  Continue regimen-HPI    6. Cardiovascular disease  Stable  Follow-up cardiology    7. Hypothyroidism, acquired  - Clinically stable  - Biochemically stable  - Continue regimen-HPI   - TSH; Future  - FREE THYROXINE; Future    8. Vitamin D deficiency  Unstable  Followed by nephrology  - VITAMIN D,25 HYDROXY (DEFICIENCY); Future    9. Secondary hyperparathyroidism (HCC)  This is due to ESRD.   We will continue to monitor.   - PTH INTACT (PTH ONLY); Future     Disposition: Return in about 4 weeks (around 7/17/2024).   Do blood work 2 week prior to next appointment with me    45 mins was spent on this visit face to face and  establishing care, reviewing medications, adjusting medication regimen, and answering questions.     Thank you kindly for allowing me to participate in the diabetes care plan for this patient.    BALDEMAR AvalosRCARLENE  6/19/24      CC:   ANGELITA Concepcion

## 2024-06-21 ENCOUNTER — APPOINTMENT (OUTPATIENT)
Dept: RADIOLOGY | Facility: MEDICAL CENTER | Age: 74
End: 2024-06-21
Attending: NURSE PRACTITIONER
Payer: MEDICARE

## 2024-06-21 DIAGNOSIS — Z12.31 SCREENING MAMMOGRAM, ENCOUNTER FOR: ICD-10-CM

## 2024-06-21 PROCEDURE — 77063 BREAST TOMOSYNTHESIS BI: CPT

## 2024-06-25 ENCOUNTER — TELEPHONE (OUTPATIENT)
Dept: VASCULAR LAB | Facility: MEDICAL CENTER | Age: 74
End: 2024-06-25
Payer: MEDICARE

## 2024-06-25 ENCOUNTER — TELEPHONE (OUTPATIENT)
Dept: HEALTH INFORMATION MANAGEMENT | Facility: OTHER | Age: 74
End: 2024-06-25

## 2024-06-25 NOTE — TELEPHONE ENCOUNTER
Called pt @ [phone] and spoke w/ pt's daughter to onboard pt for [facility]. Per pt's daughter, she agreed to receive pt's medication to her current pharmacy.     Will release Rx to pharmacy on file: [facility] #[id number]---[address], [place] [address]      NICLOE Cruz, PhT  Vascular Pharmacy Liaison (Rx Coordinator)  P: [phone]  6/25/2024 2:41 PM

## 2024-06-25 NOTE — TELEPHONE ENCOUNTER
Received New start PA request via [employer]  for ELIQUIS 2.5MG TABLETS . (Quantity:200, Day Supply:100)     Insurance: [employer]  Member ID:  [id number]  BIN: [id number]  PCN: [id number]  Group: [id number]     Ran Test claim via [place] & medication Pays for a $0/100DS  copay. Will outreach to patient to offer specialty pharmacy services and or release to preferred pharmacy    NICOLE Cruz, PhT  Vascular Pharmacy Liaison (Rx Coordinator)  P: [phone]  6/25/2024 2:36 PM

## 2024-07-01 ENCOUNTER — HOSPITAL ENCOUNTER (OUTPATIENT)
Dept: LAB | Facility: MEDICAL CENTER | Age: 74
End: 2024-07-01
Attending: INTERNAL MEDICINE
Payer: MEDICARE

## 2024-07-01 LAB
ALBUMIN SERPL BCP-MCNC: 4 G/DL (ref 3.2–4.9)
ALBUMIN/GLOB SERPL: 1.4 G/DL
ALP SERPL-CCNC: 88 U/L (ref 30–99)
ALT SERPL-CCNC: 18 U/L (ref 2–50)
ANION GAP SERPL CALC-SCNC: 10 MMOL/L (ref 7–16)
APPEARANCE UR: ABNORMAL
AST SERPL-CCNC: 16 U/L (ref 12–45)
BACTERIA #/AREA URNS HPF: ABNORMAL /HPF
BASOPHILS # BLD AUTO: 0.2 % (ref 0–1.8)
BASOPHILS # BLD: 0.01 K/UL (ref 0–0.12)
BILIRUB SERPL-MCNC: 0.5 MG/DL (ref 0.1–1.5)
BILIRUB UR QL STRIP.AUTO: NEGATIVE
BUN SERPL-MCNC: 32 MG/DL (ref 8–22)
CALCIUM ALBUM COR SERPL-MCNC: 9.9 MG/DL (ref 8.5–10.5)
CALCIUM SERPL-MCNC: 9.9 MG/DL (ref 8.4–10.2)
CHLORIDE SERPL-SCNC: 107 MMOL/L (ref 96–112)
CO2 SERPL-SCNC: 21 MMOL/L (ref 20–33)
COLOR UR: YELLOW
CREAT SERPL-MCNC: 1.53 MG/DL (ref 0.5–1.4)
EOSINOPHIL # BLD AUTO: 0.02 K/UL (ref 0–0.51)
EOSINOPHIL NFR BLD: 0.4 % (ref 0–6.9)
EPI CELLS #/AREA URNS HPF: ABNORMAL /HPF
ERYTHROCYTE [DISTWIDTH] IN BLOOD BY AUTOMATED COUNT: 48 FL (ref 35.9–50)
FASTING STATUS PATIENT QL REPORTED: NORMAL
GFR SERPLBLD CREATININE-BSD FMLA CKD-EPI: 35 ML/MIN/1.73 M 2
GLOBULIN SER CALC-MCNC: 2.8 G/DL (ref 1.9–3.5)
GLUCOSE SERPL-MCNC: 160 MG/DL (ref 65–99)
GLUCOSE UR STRIP.AUTO-MCNC: >=1000 MG/DL
HCT VFR BLD AUTO: 41 % (ref 37–47)
HGB BLD-MCNC: 13.3 G/DL (ref 12–16)
IMM GRANULOCYTES # BLD AUTO: 0.03 K/UL (ref 0–0.11)
IMM GRANULOCYTES NFR BLD AUTO: 0.7 % (ref 0–0.9)
KETONES UR STRIP.AUTO-MCNC: NEGATIVE MG/DL
LEUKOCYTE ESTERASE UR QL STRIP.AUTO: NEGATIVE
LYMPHOCYTES # BLD AUTO: 0.54 K/UL (ref 1–4.8)
LYMPHOCYTES NFR BLD: 11.8 % (ref 22–41)
MCH RBC QN AUTO: 28.2 PG (ref 27–33)
MCHC RBC AUTO-ENTMCNC: 32.4 G/DL (ref 32.2–35.5)
MCV RBC AUTO: 86.9 FL (ref 81.4–97.8)
MICRO URNS: ABNORMAL
MONOCYTES # BLD AUTO: 0.34 K/UL (ref 0–0.85)
MONOCYTES NFR BLD AUTO: 7.4 % (ref 0–13.4)
NEUTROPHILS # BLD AUTO: 3.65 K/UL (ref 1.82–7.42)
NEUTROPHILS NFR BLD: 79.5 % (ref 44–72)
NITRITE UR QL STRIP.AUTO: POSITIVE
NRBC # BLD AUTO: 0 K/UL
NRBC BLD-RTO: 0 /100 WBC (ref 0–0.2)
PH UR STRIP.AUTO: 6.5 [PH] (ref 5–8)
PLATELET # BLD AUTO: 103 K/UL (ref 164–446)
PMV BLD AUTO: 12.3 FL (ref 9–12.9)
POTASSIUM SERPL-SCNC: 5.1 MMOL/L (ref 3.6–5.5)
PROT SERPL-MCNC: 6.8 G/DL (ref 6–8.2)
PROT UR QL STRIP: NEGATIVE MG/DL
RBC # BLD AUTO: 4.72 M/UL (ref 4.2–5.4)
RBC # URNS HPF: ABNORMAL /HPF
RBC UR QL AUTO: NEGATIVE
SODIUM SERPL-SCNC: 138 MMOL/L (ref 135–145)
SP GR UR STRIP.AUTO: 1.01
TACROLIMUS BLD-MCNC: 8.1 NG/ML (ref 5–20)
WBC # BLD AUTO: 4.6 K/UL (ref 4.8–10.8)
WBC #/AREA URNS HPF: ABNORMAL /HPF

## 2024-07-01 PROCEDURE — 85025 COMPLETE CBC W/AUTO DIFF WBC: CPT

## 2024-07-01 PROCEDURE — 81001 URINALYSIS AUTO W/SCOPE: CPT

## 2024-07-01 PROCEDURE — 87077 CULTURE AEROBIC IDENTIFY: CPT

## 2024-07-01 PROCEDURE — 87086 URINE CULTURE/COLONY COUNT: CPT

## 2024-07-01 PROCEDURE — 87186 SC STD MICRODIL/AGAR DIL: CPT

## 2024-07-01 PROCEDURE — 87799 DETECT AGENT NOS DNA QUANT: CPT | Mod: 91

## 2024-07-01 PROCEDURE — 80053 COMPREHEN METABOLIC PANEL: CPT

## 2024-07-01 PROCEDURE — 80197 ASSAY OF TACROLIMUS: CPT

## 2024-07-01 PROCEDURE — 36415 COLL VENOUS BLD VENIPUNCTURE: CPT

## 2024-07-02 LAB
BK PLASMA INTERP, QNT NAAT NL11711: DETECTED
BK PLASMA IU/ML, QNT NAAT NL11709: 69 IU/ML
BK PLASMA LOG IU/ML, QNT NAAT NL11710: 1.84 LOG IU/ML

## 2024-07-03 LAB
BACTERIA UR CULT: ABNORMAL
BACTERIA UR CULT: ABNORMAL
BK UR INTERP, QNT NAAT NL11708: DETECTED
BK UR IU/ML, QNT NAAT NL11706: ABNORMAL IU/ML
BK UR LOG IU/ML, QNT NAAT NL11707: 5.47 LOG IU/ML
SIGNIFICANT IND 70042: ABNORMAL
SITE SITE: ABNORMAL
SOURCE SOURCE: ABNORMAL

## 2024-07-19 ENCOUNTER — HOSPITAL ENCOUNTER (OUTPATIENT)
Dept: LAB | Facility: MEDICAL CENTER | Age: 74
End: 2024-07-19
Payer: MEDICARE

## 2024-07-19 ENCOUNTER — TELEPHONE (OUTPATIENT)
Dept: MEDICAL GROUP | Facility: MEDICAL CENTER | Age: 74
End: 2024-07-19
Payer: MEDICARE

## 2024-07-19 DIAGNOSIS — B96.29 UTI DUE TO EXTENDED-SPECTRUM BETA LACTAMASE (ESBL) PRODUCING ESCHERICHIA COLI: ICD-10-CM

## 2024-07-19 DIAGNOSIS — E11.69 TYPE 2 DIABETES MELLITUS WITH OTHER SPECIFIED COMPLICATION, WITH LONG-TERM CURRENT USE OF INSULIN (HCC): ICD-10-CM

## 2024-07-19 DIAGNOSIS — N39.0 UTI DUE TO EXTENDED-SPECTRUM BETA LACTAMASE (ESBL) PRODUCING ESCHERICHIA COLI: ICD-10-CM

## 2024-07-19 DIAGNOSIS — Z79.4 TYPE 2 DIABETES MELLITUS WITH OTHER SPECIFIED COMPLICATION, WITH LONG-TERM CURRENT USE OF INSULIN (HCC): ICD-10-CM

## 2024-07-19 DIAGNOSIS — Z16.12 UTI DUE TO EXTENDED-SPECTRUM BETA LACTAMASE (ESBL) PRODUCING ESCHERICHIA COLI: ICD-10-CM

## 2024-07-19 LAB
25(OH)D3 SERPL-MCNC: 14 NG/ML (ref 30–100)
ALBUMIN SERPL BCP-MCNC: 4.2 G/DL (ref 3.2–4.9)
ALBUMIN/GLOB SERPL: 1.4 G/DL
ALP SERPL-CCNC: 88 U/L (ref 30–99)
ALT SERPL-CCNC: 24 U/L (ref 2–50)
ANION GAP SERPL CALC-SCNC: 13 MMOL/L (ref 7–16)
AST SERPL-CCNC: 18 U/L (ref 12–45)
BILIRUB SERPL-MCNC: 0.5 MG/DL (ref 0.1–1.5)
BUN SERPL-MCNC: 28 MG/DL (ref 8–22)
CALCIUM ALBUM COR SERPL-MCNC: 10.1 MG/DL (ref 8.5–10.5)
CALCIUM SERPL-MCNC: 10.3 MG/DL (ref 8.4–10.2)
CHLORIDE SERPL-SCNC: 102 MMOL/L (ref 96–112)
CHOLEST SERPL-MCNC: 145 MG/DL (ref 100–199)
CO2 SERPL-SCNC: 21 MMOL/L (ref 20–33)
CREAT SERPL-MCNC: 1.57 MG/DL (ref 0.5–1.4)
CREAT UR-MCNC: 77.71 MG/DL
GFR SERPLBLD CREATININE-BSD FMLA CKD-EPI: 34 ML/MIN/1.73 M 2
GLOBULIN SER CALC-MCNC: 2.9 G/DL (ref 1.9–3.5)
GLUCOSE SERPL-MCNC: 167 MG/DL (ref 65–99)
HDLC SERPL-MCNC: 45 MG/DL
LDLC SERPL CALC-MCNC: 37 MG/DL
MICROALBUMIN UR-MCNC: 4.6 MG/DL
MICROALBUMIN/CREAT UR: 59 MG/G (ref 0–30)
POTASSIUM SERPL-SCNC: 5.1 MMOL/L (ref 3.6–5.5)
PROT SERPL-MCNC: 7.1 G/DL (ref 6–8.2)
SODIUM SERPL-SCNC: 136 MMOL/L (ref 135–145)
T4 FREE SERPL-MCNC: 1.7 NG/DL (ref 0.93–1.7)
TRIGL SERPL-MCNC: 313 MG/DL (ref 0–149)
TSH SERPL DL<=0.005 MIU/L-ACNC: 6.71 UIU/ML (ref 0.38–5.33)

## 2024-07-19 PROCEDURE — 84439 ASSAY OF FREE THYROXINE: CPT

## 2024-07-19 PROCEDURE — 80061 LIPID PANEL: CPT

## 2024-07-19 PROCEDURE — 82043 UR ALBUMIN QUANTITATIVE: CPT

## 2024-07-19 PROCEDURE — 36415 COLL VENOUS BLD VENIPUNCTURE: CPT

## 2024-07-19 PROCEDURE — 84443 ASSAY THYROID STIM HORMONE: CPT

## 2024-07-19 PROCEDURE — 82306 VITAMIN D 25 HYDROXY: CPT

## 2024-07-19 PROCEDURE — 80053 COMPREHEN METABOLIC PANEL: CPT

## 2024-07-19 PROCEDURE — 82570 ASSAY OF URINE CREATININE: CPT

## 2024-07-19 RX ORDER — NITROFURANTOIN 25; 75 MG/1; MG/1
100 CAPSULE ORAL 2 TIMES DAILY
Qty: 14 CAPSULE | Refills: 0 | Status: SHIPPED | OUTPATIENT
Start: 2024-07-19 | End: 2024-07-26

## 2024-07-24 ENCOUNTER — OFFICE VISIT (OUTPATIENT)
Dept: ENDOCRINOLOGY | Facility: MEDICAL CENTER | Age: 74
End: 2024-07-24
Payer: MEDICARE

## 2024-07-24 VITALS
DIASTOLIC BLOOD PRESSURE: 50 MMHG | WEIGHT: 133 LBS | HEIGHT: 63 IN | SYSTOLIC BLOOD PRESSURE: 140 MMHG | HEART RATE: 50 BPM | OXYGEN SATURATION: 97 % | BODY MASS INDEX: 23.57 KG/M2

## 2024-07-24 DIAGNOSIS — E78.5 DYSLIPIDEMIA: ICD-10-CM

## 2024-07-24 DIAGNOSIS — Z79.4 TYPE 2 DIABETES MELLITUS WITH OTHER SPECIFIED COMPLICATION, WITH LONG-TERM CURRENT USE OF INSULIN (HCC): ICD-10-CM

## 2024-07-24 DIAGNOSIS — N25.81 SECONDARY HYPERPARATHYROIDISM (HCC): ICD-10-CM

## 2024-07-24 DIAGNOSIS — E11.69 TYPE 2 DIABETES MELLITUS WITH OTHER SPECIFIED COMPLICATION, WITH LONG-TERM CURRENT USE OF INSULIN (HCC): ICD-10-CM

## 2024-07-24 DIAGNOSIS — E03.9 HYPOTHYROIDISM, ACQUIRED: ICD-10-CM

## 2024-07-24 DIAGNOSIS — G62.9 POLYNEUROPATHY: ICD-10-CM

## 2024-07-24 DIAGNOSIS — I25.10 CARDIOVASCULAR DISEASE: ICD-10-CM

## 2024-07-24 DIAGNOSIS — I10 ESSENTIAL HYPERTENSION: ICD-10-CM

## 2024-07-24 DIAGNOSIS — E55.9 VITAMIN D DEFICIENCY: ICD-10-CM

## 2024-07-24 DIAGNOSIS — N18.6 ESRD (END STAGE RENAL DISEASE) (HCC): ICD-10-CM

## 2024-07-24 PROCEDURE — 99211 OFF/OP EST MAY X REQ PHY/QHP: CPT

## 2024-07-24 PROCEDURE — 99215 OFFICE O/P EST HI 40 MIN: CPT

## 2024-07-24 PROCEDURE — 3077F SYST BP >= 140 MM HG: CPT

## 2024-07-24 PROCEDURE — 3078F DIAST BP <80 MM HG: CPT

## 2024-07-24 RX ORDER — KETOROLAC TROMETHAMINE 30 MG/ML
1 INJECTION, SOLUTION INTRAMUSCULAR; INTRAVENOUS DAILY
Qty: 1 EACH | Refills: 0 | Status: SHIPPED | OUTPATIENT
Start: 2024-07-24

## 2024-07-24 RX ORDER — LEVOTHYROXINE SODIUM 88 UG/1
88 TABLET ORAL
Qty: 90 TABLET | Refills: 3 | Status: SHIPPED | OUTPATIENT
Start: 2024-07-24

## 2024-07-24 RX ORDER — BLOOD-GLUCOSE SENSOR
1 EACH MISCELLANEOUS
Qty: 6 EACH | Refills: 3 | Status: SHIPPED | OUTPATIENT
Start: 2024-07-24

## 2024-07-24 ASSESSMENT — FIBROSIS 4 INDEX: FIB4 SCORE: 2.64

## 2024-08-12 ENCOUNTER — HOSPITAL ENCOUNTER (OUTPATIENT)
Dept: LAB | Facility: MEDICAL CENTER | Age: 74
End: 2024-08-12
Payer: MEDICARE

## 2024-08-12 LAB
ALBUMIN SERPL BCP-MCNC: 4.2 G/DL (ref 3.2–4.9)
ALBUMIN/GLOB SERPL: 1.6 G/DL
ALP SERPL-CCNC: 67 U/L (ref 30–99)
ALT SERPL-CCNC: 17 U/L (ref 2–50)
ANION GAP SERPL CALC-SCNC: 14 MMOL/L (ref 7–16)
AST SERPL-CCNC: 13 U/L (ref 12–45)
BILIRUB SERPL-MCNC: 0.6 MG/DL (ref 0.1–1.5)
BUN SERPL-MCNC: 29 MG/DL (ref 8–22)
CALCIUM ALBUM COR SERPL-MCNC: 10.1 MG/DL (ref 8.5–10.5)
CALCIUM SERPL-MCNC: 10.3 MG/DL (ref 8.4–10.2)
CHLORIDE SERPL-SCNC: 103 MMOL/L (ref 96–112)
CHOLEST SERPL-MCNC: 123 MG/DL (ref 100–199)
CO2 SERPL-SCNC: 19 MMOL/L (ref 20–33)
CREAT SERPL-MCNC: 1.47 MG/DL (ref 0.5–1.4)
FASTING STATUS PATIENT QL REPORTED: NORMAL
GFR SERPLBLD CREATININE-BSD FMLA CKD-EPI: 37 ML/MIN/1.73 M 2
GLOBULIN SER CALC-MCNC: 2.6 G/DL (ref 1.9–3.5)
GLUCOSE SERPL-MCNC: 142 MG/DL (ref 65–99)
HDLC SERPL-MCNC: 42 MG/DL
LDLC SERPL CALC-MCNC: 36 MG/DL
POTASSIUM SERPL-SCNC: 5 MMOL/L (ref 3.6–5.5)
PROT SERPL-MCNC: 6.8 G/DL (ref 6–8.2)
SODIUM SERPL-SCNC: 136 MMOL/L (ref 135–145)
T4 FREE SERPL-MCNC: 1.74 NG/DL (ref 0.93–1.7)
TRIGL SERPL-MCNC: 226 MG/DL (ref 0–149)
TSH SERPL DL<=0.005 MIU/L-ACNC: 4.82 UIU/ML (ref 0.38–5.33)

## 2024-08-12 PROCEDURE — 84439 ASSAY OF FREE THYROXINE: CPT

## 2024-08-12 PROCEDURE — 36415 COLL VENOUS BLD VENIPUNCTURE: CPT

## 2024-08-12 PROCEDURE — 80061 LIPID PANEL: CPT

## 2024-08-12 PROCEDURE — 84443 ASSAY THYROID STIM HORMONE: CPT

## 2024-08-12 PROCEDURE — 80053 COMPREHEN METABOLIC PANEL: CPT

## 2024-08-21 ENCOUNTER — OFFICE VISIT (OUTPATIENT)
Dept: ENDOCRINOLOGY | Facility: MEDICAL CENTER | Age: 74
End: 2024-08-21
Attending: INTERNAL MEDICINE
Payer: MEDICARE

## 2024-08-21 ENCOUNTER — APPOINTMENT (OUTPATIENT)
Dept: MEDICAL GROUP | Facility: MEDICAL CENTER | Age: 74
End: 2024-08-21
Payer: MEDICARE

## 2024-08-21 DIAGNOSIS — Z79.4 TYPE 2 DIABETES MELLITUS WITH OTHER SPECIFIED COMPLICATION, WITH LONG-TERM CURRENT USE OF INSULIN (HCC): ICD-10-CM

## 2024-08-21 DIAGNOSIS — E11.69 TYPE 2 DIABETES MELLITUS WITH OTHER SPECIFIED COMPLICATION, WITH LONG-TERM CURRENT USE OF INSULIN (HCC): ICD-10-CM

## 2024-08-21 PROCEDURE — 99212 OFFICE O/P EST SF 10 MIN: CPT

## 2024-08-21 PROCEDURE — 99999 PR NO CHARGE: CPT

## 2024-08-21 NOTE — PROGRESS NOTES
Utilized translation services for this encounter.  Language Line - [phone]  [employer] ID - [id number]   Libby, ID # [id number]      Patient presents today for Pearl 3 CGM insertion and education. Patient successfully applied sensor on the back of her upper right arm. Advised to bring reader to future clinic visits. All questions were answered to patient's satisfaction.    Марина Palencia, DominiqueD, BCACP

## 2024-08-31 NOTE — PROGRESS NOTES
08/31/24 0830 08/31/24 0835 08/31/24 0841   Vital Signs   Temp 97.5 °F (36.4 °C)  --   --    Temp Source Oral  --   --    Pulse 88  --   --    Heart Rate Source Monitor  --   --    Resp 18  --   --    SpO2 95 %  --   --    Flow (L/min) (Oxygen Therapy) 2  --   --    Device (Oxygen Therapy) nasal cannula  --   --    BP (!) 88/51 (!) 98/54 (!) 107/55   MAP (mmHg) 65 71 74   BP Location Left arm  --   --    BP Method Automatic  --   --    Patient Position Lying  --   --       08/31/24 0850   Vital Signs   Temp  --    Temp Source  --    Pulse  --    Heart Rate Source  --    Resp  --    SpO2  --    Flow (L/min) (Oxygen Therapy)  --    Device (Oxygen Therapy)  --    BP (!) 109/59   MAP (mmHg) 76   BP Location  --    BP Method  --    Patient Position  --      Paged CTS. BP improved by itself. CTS on call resdient stated okay to give Metoprolol and IVP lasix given.    Chief Complaint   Patient presents with    Follow-Up     ESRD       CC: f/u CKD and transplant  She is with son, Ronny. They decline telephone .  Unfortunately Valeriano is not familiar with the patient's medications, nor is the patient.  They also dial in her son, Manoj, to join the conversation via telephone    HPI:  Tere Gallegos is a 72 y.o. female with a history of end-stage renal disease status post  donor kidney transplant 10/31/2022 at [facility] notable for delayed graft function requiring dialysis until mid 2022, diabetes, hypertension, permanent atrial fibrillation, pancytopenia, BK viremia who presents today for follow-up.      Re: ESRD, patient was on dialysis since .  The etiology of ESRD was thought to be due to diabetes, hypertension, and solitary kidney.  Patient was anuric prior to kidney transplant 10/31/2022.  Patient had delayed graft function and required dialysis until 2022.  Posttransplant course has been complicated by pancytopenia and BK viremia and BK viruria.  Creatinine and GFR has stabilized with his transplant CKD 3B category.    Re: immunosuppression, patient was induced with Simulect on 10/31/2022.  Patient was originally on mycophenolate 1000 mg p.o. twice daily, tacrolimus 1 mg p.o. twice daily, and prednisone 10 mg daily shortly after transplant.  Her dosages were weaned down.  She was seen by nephrology during hospitalization in 2023, and it was recommended that she continue immunosuppression, but she was discharged off of tacrolimus and mycophenolate. She says she is taking prednisone daily but doesn't remember dosage. She doesn't remember. Other son Manoj says she is taking tacrolimus 1mg in morning and 2mg in evening. He says she is taking MMF 500mg once in AM. I recommend changing mycophenolate to 250 mg p.o. twice daily.    Re: hypertension, complains of LE edema, started in March. She is checking BP at home, but she  doesn't remember numbers.  She thinks she is taking furosemide, but does not remember.      Past Medical History:   Diagnosis Date    Acquired hypothyroidism 2020    CAD (coronary artery disease)     Chronic diastolic heart failure ([facility]) 2020    Coronary artery disease due to lipid rich plaque     2 Synergy NOEMI to 100% RCA stent placed    Dental disorder     partial dentures- uppers    Diabetes ([facility])     oral medication    ESRD (end stage renal disease) on dialysis ([facility]) 2020    Hemodialysis patient ([facility])     M, W, F    Hyperlipidemia     Hypertension     Kidney transplant candidate     Kidney transplant recipient 10/31/2022    Presence of drug-eluting stent in right coronary artery     QT prolongation 2020    RLS (restless legs syndrome) 2016    Transaminitis 2018     Past Surgical History:   Procedure Laterality Date    OTHER ABDOMINAL SURGERY Right 10/31/2022     Donor kidney transplant - [facility] CARDIAC CATH  2016    RCA stented with 2 Synergy drug-eluting stents.    RECOVERY  2016    Procedure: CATH LAB [facility] WITH POSSIBLE DR. CASTILLO;  Surgeon: [facility];  [place] [facility] [place]  Service:     [facility] CARDIAC CATH  2016    100% RCA    OTHER Left 2014    left arm upper extremity fistula    OTHER ABDOMINAL SURGERY      left kidney removed due to cancer         Outpatient Encounter Medications as of 2023   Medication Sig Dispense Refill    tacrolimus (PROGRAF) 1 MG Cap Take 1 Capsule by mouth 2 times a day. 180 Capsule 3    mycophenolate (CELLCEPT) 250 MG Cap Take 1 Capsule by mouth 2 times a day. 20 Capsule 0    furosemide (LASIX) 20 MG Tab Take 20 mg by mouth 2 times a day.      apixaban (ELIQUIS) 2.5mg Tab Take 1 Tablet by mouth 2 times a day. 180 Tablet 1    minoxidil (LONITEN) 2.5 MG Tab Take 1 Tablet by mouth every day. 30 Tablet 0    sodium bicarbonate (SODIUM BICARBONATE) 650 MG Tab Take  2 Tablets by mouth in the morning, at noon, and at bedtime. 120 Tablet 0    thiamine (THIAMINE) 100 MG tablet Take 1 Tablet by mouth every day. 30 Tablet 0    metoprolol SR (TOPROL XL) 25 MG TABLET SR 24 HR Take 25 mg by mouth every day.      levothyroxine (SYNTHROID) 75 MCG Tab Take 1 Tablet by mouth every morning on an empty stomach. 90 Tablet 4    BD PEN NEEDLE PETE 2ND GEN USE AS DIRECTED WITH INSULIN PENS THREE TIMES DAILY BEFORE A MEAL      insulin glargine (LANTUS SOLOSTAR) 100 UNIT/ML Solution Pen-injector injection Inject 5 Units under the skin every day.      amLODIPine (NORVASC) 10 MG Tab Take 10 mg by mouth every day.      losartan (COZAAR) 100 MG Tab Take 1 Tablet by mouth every evening. 90 Tablet 3    glucose blood (ONETOUCH VERIO) strip 1 Strip by Other route as needed (on insulin checking 3-4 times a day). 150 Strip 6    Blood Glucose Monitoring Suppl (ONE TOUCH ULTRA 2) w/Device Kit 1 DEVICE 3 TIMES A DAY BEFORE MEALS. 1 Kit 0    atorvastatin (LIPITOR) 20 MG Tab Take 1 Tablet by mouth every evening. 100 Tablet 3    Lancets Use one Freestyle Pearl lancet to test blood sugar once daily . 300 Each 3    insulin aspart (NOVOLOG FLEXPEN) 100 UNIT/ML injection PEN Inject 2-8 Units under the skin 3 times a day before meals. Sliding Scale      [DISCONTINUED] tacrolimus (PROGRAF) 1 MG Cap Take  by mouth.      [DISCONTINUED] mycophenolate (CELLCEPT) 500 MG tablet Take 500 mg by mouth every day.      predniSONE (DELTASONE) 5 MG Tab Take 5 mg by mouth every day.       No facility-administered encounter medications on file as of 4/25/2023.        No Known Allergies    Review of Systems   Constitutional:  Negative for fever.   Respiratory:  Negative for shortness of breath.    Cardiovascular:  Positive for chest pain, palpitations and leg swelling.   Gastrointestinal:  Negative for abdominal pain.   Genitourinary:  Negative for dysuria.   All other systems reviewed and are negative.    /84 (BP Location:  Right arm, Patient Position: Sitting, BP Cuff Size: Adult)   Pulse 67   Temp 36.8 °C (98.2 °F) (Temporal)   Resp 18   Ht 1.524 m (5')   Wt 61.7 kg (136 lb)   LMP  (LMP Unknown)   SpO2 97%   BMI 26.56 kg/m²     Physical Exam  Constitutional:       General: She is not in acute distress.  HENT:      Mouth/Throat:      Pharynx: No oropharyngeal exudate.   Eyes:      General: No scleral icterus.  Neck:      Trachea: No tracheal deviation.   Cardiovascular:      Rate and Rhythm: Normal rate and regular rhythm.      Heart sounds: Normal heart sounds. No murmur heard.  Pulmonary:      Effort: Pulmonary effort is normal.      Breath sounds: Normal breath sounds. No stridor. No rales.   Abdominal:      General: Bowel sounds are normal.      Palpations: Abdomen is soft.      Tenderness: There is no abdominal tenderness.   Musculoskeletal:         General: Normal range of motion.      Cervical back: Neck supple.      Right lower leg: Edema (2+) present.      Left lower leg: Edema (2+) present.   Skin:     General: Skin is warm and dry.      Findings: No rash.   Neurological:      General: No focal deficit present.      Mental Status: She is alert and oriented to person, place, and time.   Psychiatric:         Mood and Affect: Mood and affect normal.         Behavior: Behavior normal.   Dialysis access: Left brachiobasilic AV fistula, with patent bruit and thrill.  Fistula is mildly hyper pulsatile, with minimal collapse on arm raise test.      Labs reviewed.  Recent Labs     12/19/22  0810 12/20/22  0430 01/23/23  0848 01/30/23  0853 02/18/23  0417 02/19/23  0059 04/04/23  0107 04/05/23  0416 04/06/23  0030 04/10/23  0723 04/17/23  0646 04/24/23  0702   ALBUMIN 4.4   < > 4.3   < > 3.6   < > 3.5 3.1*  --  3.8  --   --    HDL 39*  --  45  --  31*  --   --   --   --   --   --   --    TRIGLYCERIDE 177*  --  109  --  144  --   --   --   --   --   --   --    SODIUM 140   < > 138   < > 139   < > 136 136   < > 138 140 140    POTASSIUM 4.4   < > 4.9   < > 3.4*   < > 4.8 4.3   < > 5.0 4.4 4.0   CHLORIDE 108   < > 108   < > 110   < > 107 104   < > 105 110 109   CO2 21   < > 20   < > 18*   < > 20 21   < > 24 21 20   BUN 22   < > 26*   < > 25*   < > 27* 26*   < > 33* 29* 29*   CREATININE 1.07   < > 1.00   < > 0.87   < > 1.34 1.31   < > 2.35* 1.88* 1.79*   PHOSPHORUS  --    < >  --    < > 2.8   < > 1.8* 4.0  --   --  2.7 2.8    < > = values in this interval not displayed.       Lab Results   Component Value Date/Time    WBC 3.6 (L) 2023 07:02 AM    RBC 3.11 (L) 2023 07:02 AM    HEMOGLOBIN 8.7 (L) 2023 07:02 AM    HEMATOCRIT 28.9 (L) 2023 07:02 AM    MCV 92.9 2023 07:02 AM    MCH 28.0 2023 07:02 AM    MCHC 30.1 (L) 2023 07:02 AM    MPV 10.8 2023 07:02 AM      Recent Labs     23  0702   WBC 3.6*   RBC 3.11*   HEMOGLOBIN 8.7*   HEMATOCRIT 28.9*   MCV 92.9   MCH 28.0   MCHC 30.1*   RDW 50.5*   PLATELETCT 151*   MPV 10.8     Recent Labs     23  0702   SODIUM 140   POTASSIUM 4.0   CHLORIDE 109   CO2 20   GLUCOSE 99   BUN 29*   CREATININE 1.79*   CALCIUM 9.7     URINALYSIS:  Lab Results   Component Value Date/Time    COLORURINE Yellow 04/10/2023 0723    CLARITY Clear 04/10/2023 0723    SPECGRAVITY <=1.005 04/10/2023 0723    PHURINE 7.0 04/10/2023 0723    KETONES Negative 04/10/2023 07    PROTEINURIN Negative 04/10/2023 0723    BILIRUBINUR Negative 04/10/2023 0723    UROBILU 0.2 2023 1616    NITRITE Negative 04/10/2023 0723    LEUKESTERAS Negative 04/10/2023 0723    OCCULTBLOOD Negative 04/10/2023 0723     [facility]  No results found for: TOTPROTUR No results found for: CREATININEU    Imaging report(s) reviewed  No orders to display         Assessment:  Tere Gallegos is a 72 y.o. female with a history of end-stage renal disease status post  donor kidney transplant 10/31/2022 at [facility] notable for delayed graft function requiring dialysis until mid 2022,  diabetes, hypertension, permanent atrial fibrillation, pancytopenia, BK viremia who presents today for follow-up.    Plan:  1. ESRD s/p kidney transplant 10/31/22  -Original ESRD diagnosis from solitary kidney, diabetes, hypertension.  Started hemodialysis in 2014.  Patient now with functioning kidney transplant.  Off of dialysis since November 18, 2022.    2.  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  I explained the importance of blood pressure and glycemic control to help maintain good kidney function.  Avoid NSAIDs and other nephrotoxins.  Recommend low-sodium diet.    3.  Hypertension  - With evidence of volume overload.  Recommend increase Lasix from 20 mg p.o. twice daily to 40 mg p.o. twice daily.  Continue losartan 100 mg daily for long-term kidney protection.  If blood pressure becomes low, I would recommend discontinue minoxidil.    4. Permanent atrial fibrillation (HCC)  -Patient is anticoagulated with apixaban, rate control with metoprolol.  Defer further management to primary team.    5. Long term current use of immunosuppressive drug complicated by BK viremia  -Patient thinks that she is on prednisone 5 mg daily, tacrolimus 1 mg in the morning and 2 mg in the evening, and mycophenolate 500 mg once a day.  Given the BK viremia, I recommend reduce tacrolimus to 1 mg p.o. twice daily, and space out the mycophenolate to 250 mg p.o. twice daily.  I recommend continuing to follow with transplant nephrology to further manage immunosuppression.  If BK viremia persists, I imagine they would want to discontinue mycophenolate, as patient also has pancytopenia.    6. Pancytopenia (HCC)  -Persistent.  If this worsens or persists, I would likely recommend lowering or discontinuing mycophenolate prescription, but this should be coordinated with transplant nephrology at [facility] in [place].    7.  Left arm AV fistula swelling  - Likely due to venous  outlet obstruction.  Recommend referral to interventional nephrology for consideration of angiogram and angioplasty.    Recommend at least standing monthly labs.  Return to clinic 6 weeks with pre-clinic labs, and bring all home medications and blood pressure log.    Priyank Villar MD  Nephrology  [facility]

## 2024-09-09 DIAGNOSIS — I48.0 PAF (PAROXYSMAL ATRIAL FIBRILLATION) (HCC): ICD-10-CM

## 2024-09-09 DIAGNOSIS — I15.0 RENOVASCULAR HYPERTENSION: ICD-10-CM

## 2024-09-10 RX ORDER — AMIODARONE HYDROCHLORIDE 200 MG/1
200 TABLET ORAL
Qty: 90 TABLET | Refills: 1 | Status: SHIPPED | OUTPATIENT
Start: 2024-09-10

## 2024-09-10 NOTE — TELEPHONE ENCOUNTER
Received request via: Pharmacy    Was the patient seen in the last year in this department? Yes    Does the patient have an active prescription (recently filled or refills available) for medication(s) requested? No    Pharmacy Name:  [facility]/pharmacy #[id number] - [place], [place] - [address]     Does the patient have alf Plus and need 100-day supply? (This applies to ALL medications) Yes, quantity updated to 100 days

## 2024-09-11 ENCOUNTER — OFFICE VISIT (OUTPATIENT)
Dept: ENDOCRINOLOGY | Facility: MEDICAL CENTER | Age: 74
End: 2024-09-11
Payer: MEDICARE

## 2024-09-11 VITALS
WEIGHT: 129.1 LBS | BODY MASS INDEX: 22.88 KG/M2 | HEART RATE: 63 BPM | OXYGEN SATURATION: 93 % | RESPIRATION RATE: 17 BRPM | HEIGHT: 63 IN | SYSTOLIC BLOOD PRESSURE: 140 MMHG | DIASTOLIC BLOOD PRESSURE: 50 MMHG

## 2024-09-11 DIAGNOSIS — G62.9 POLYNEUROPATHY: ICD-10-CM

## 2024-09-11 DIAGNOSIS — I25.10 CARDIOVASCULAR DISEASE: ICD-10-CM

## 2024-09-11 DIAGNOSIS — E78.5 DYSLIPIDEMIA: ICD-10-CM

## 2024-09-11 DIAGNOSIS — Z79.4 TYPE 2 DIABETES MELLITUS WITH OTHER SPECIFIED COMPLICATION, WITH LONG-TERM CURRENT USE OF INSULIN (HCC): ICD-10-CM

## 2024-09-11 DIAGNOSIS — E11.69 TYPE 2 DIABETES MELLITUS WITH OTHER SPECIFIED COMPLICATION, WITH LONG-TERM CURRENT USE OF INSULIN (HCC): ICD-10-CM

## 2024-09-11 DIAGNOSIS — N18.6 ESRD (END STAGE RENAL DISEASE) (HCC): ICD-10-CM

## 2024-09-11 DIAGNOSIS — I10 ESSENTIAL HYPERTENSION: ICD-10-CM

## 2024-09-11 DIAGNOSIS — E55.9 VITAMIN D DEFICIENCY: ICD-10-CM

## 2024-09-11 DIAGNOSIS — N25.81 SECONDARY HYPERPARATHYROIDISM (HCC): ICD-10-CM

## 2024-09-11 DIAGNOSIS — E03.9 HYPOTHYROIDISM, ACQUIRED: ICD-10-CM

## 2024-09-11 PROCEDURE — 95251 CONT GLUC MNTR ANALYSIS I&R: CPT

## 2024-09-11 PROCEDURE — 99213 OFFICE O/P EST LOW 20 MIN: CPT

## 2024-09-11 PROCEDURE — 99214 OFFICE O/P EST MOD 30 MIN: CPT

## 2024-09-11 PROCEDURE — 95249 CONT GLUC MNTR PT PROV EQP: CPT

## 2024-09-11 PROCEDURE — 3078F DIAST BP <80 MM HG: CPT

## 2024-09-11 PROCEDURE — 3077F SYST BP >= 140 MM HG: CPT

## 2024-09-11 RX ORDER — AMLODIPINE BESYLATE 10 MG/1
10 TABLET ORAL
Qty: 90 TABLET | Refills: 1 | Status: SHIPPED | OUTPATIENT
Start: 2024-09-11

## 2024-09-11 ASSESSMENT — FIBROSIS 4 INDEX: FIB4 SCORE: 2.27

## 2024-09-11 NOTE — LETTER
[facility]  ANGELITA Concepcion  [address]  [place] [address]  Fax: [phone]   Authorization for Release/Disclosure of   Protected Health Information   Name: DIOR GALLEGOS : 1950 SSN: [id number]   Address: [address] [place] [address]  [place] [address] Phone:    [phone] (home) [phone] (work)   I authorize the entity listed below to release/disclose the PHI below to:   [facility]/ANGELITA Concepcion and ANGELITA Avalos   Provider or Entity Name:    [facility]   Address   City, State, Zip   Phone:      Fax:     Reason for request: continuity of care   Information to be released:    Retinal exam    [  ] Check here and initial the line next to each item to release ALL health information INCLUDING  _____ Care and treatment for drug and / or alcohol abuse  _____ HIV testing, infection status, or AIDS  _____ Genetic Testing    DATES OF SERVICE OR TIME PERIOD TO BE DISCLOSED: _____________  I understand and acknowledge that:  * This Authorization may be revoked at any time by you in writing, except if your health information has already been used or disclosed.  * Your health information that will be used or disclosed as a result of you signing this authorization could be re-disclosed by the recipient. If this occurs, your re-disclosed health information may no longer be protected by State or Federal laws.  * You may refuse to sign this Authorization. Your refusal will not affect your ability to obtain treatment.  * This Authorization becomes effective upon signing and will  on (date) __________.      If no date is indicated, this Authorization will  one (1) year from the signature date.    Name: Dior Gallegos  Signature:  continued medical care Date:   2024     PLEASE FAX REQUESTED RECORDS BACK TO: [phone]

## 2024-09-11 NOTE — PROGRESS NOTES
"Chief Complaint: Follow-up on the following conditions  HPI:   Tere Gallegos is a 73 y.o. female    1.  Type 2 diabetes mellitus with hyperglycemia:   Type 2 Diabetes Mellitus diagnosed 20 years ago.      She checks her blood sugars 3x/day  POC A1c on 9/11/24 is 8.8  POC A1c on 6/12/24 was 9.5  POC A1c on 2/29/2024 at 7.2%  POC a1c on 6/7/2023 at 6.2%  POC a1c on 02/18/2023 6.0%  POC a1c on 1/03/2023 at 5.8%  Last A1C on 11/8/21 at 5.7%, she was a dialysis patient which makes A1c unreliable.    Diabetes management:  Lantus 15 in am and 15 in pm  Novolg 10 units if BG >200 - 10 units. She has been taking it after eating.    Farxiga 5 mg daily- not taking    She reports hypoglycemic episodes in the morning    Diet: \"healthy\" diet  in general  Exercise: minimal     Diabetes Complications   She  reports history of mild proliferative diabetic retinopathy.    She denies laser eye surgery.   Cataract surgery both eyes were done this year   Last eye exam: apt on July 31st      2.  Neuropathy:   Reports numbness or tingling to her feet but reports pain occasionally she is currently taking Lyrica.  she denies history of foot sores.     3.  ESRD  Surgery in [facility] in 10/31/22   Currently taking Losartan  140/50  Dr. Villar   Latest Reference Range & Units 07/19/24 07:29   Micro Alb Creat Ratio 0 - 30 mg/g 59 (H)   Creatinine, Urine mg/dL 77.71   Microalbumin, Urine Random mg/dL 4.6        Latest Reference Range & Units 08/12/24 07:33   Bun 8 - 22 mg/dL 29 (H)   Creatinine 0.50 - 1.40 mg/dL 1.47 (H)   GFR (CKD-EPI) >60 mL/min/1.73 m 2 37 !          Latest Reference Range & Units 07/19/24 07:29   Potassium 3.6 - 5.5 mmol/L 5.1     4.  Cardiovascular disease:  Paroxysmal Atrial Fibrillation-She is taking Eliquis.   She is seeing at [facility]-Aster Santamaria     5.  Hypothyroidism, acquired:   She is currently 88 mcg of levothyroxine daily  She takes it every morning on " an empty stomach  Denies taking any iron, calcium, multivitamins, antiacids with the medication    Denies fatigue, constipation, dry skin, palpitations.    Latest Reference Range & Units 08/12/24 07:33   TSH 0.380 - 5.330 uIU/mL 4.820   Free T-4 0.93 - 1.70 ng/dL 1.74 (H)     6. Essential Hypertension:  FV by cardiology   Currently taking Norvasc 10 mg, carvedilol 25 mg, lisinopril 40 mg  BP in office today was 140/50  She has not taken her medications.   denies any dizziness, palpitations, chest pain    7.  Dyslipidemia:  She is currently taking statin-atorvastatin 20 mg daily   She still complaining of muscle aches especially in her lower legs   Latest Reference Range & Units 08/12/24 07:33   Cholesterol,Tot 100 - 199 mg/dL 123   Triglycerides 0 - 149 mg/dL 226 (H)   HDL >=40 mg/dL 42   LDL <100 mg/dL 36     8. Secondary hyperparathyroidism:  Currently not taking any vitamin D  Fv by Dr. Blackmon    Latest Reference Range & Units 08/12/24 07:33   Calcium 8.4 - 10.2 mg/dL 10.3 (H)   Correct Calcium 8.5 - 10.5 mg/dL 10.1      Latest Reference Range & Units 07/19/24 07:29   25-Hydroxy   Vitamin D 25 30 - 100 ng/mL 14 (L)      Latest Reference Range & Units 09/14/23 07:06   Pth, Intact 14.0 - 72.0 pg/mL 103.0 (H)     ROS:     CONS:     No fever, no chills, no weight loss, no fatigue   EYES:      No diplopia, no blurry vision, no redness of eyes, no swelling of eyelids   ENT:    No hearing loss, No ear pain, No sore throat, no dysphagia, no neck swelling   CV:     No chest pain, no palpitations, no claudication, no orthopnea, no PND   PULM:    No SOB, no cough, no hemoptysis, no wheezing    GI:   No nausea, no vomiting, no diarrhea, no constipation, no bloody stools   :  Passing urine well, no dysuria, no hematuria   ENDO:   No polyuria, no polydipsia, no heat intolerance, no cold intolerance   NEURO: No headaches, no dizziness, no convulsions, no tremors   MUSC:  No joint swellings, no arthralgias, no myalgias, no  weakness   SKIN:   No rash, no ulcers, no dry skin   PSYCH:   No depression, no anxiety, no difficulty sleeping       Past Medical History:  Patient Active Problem List    Diagnosis Date Noted    Dyslipidemia 2024    Cardiovascular disease 2024    Vitamin D deficiency 2024    Secondary hyperparathyroidism (HCC) 2024    Hypertensive heart disease with heart failure and stage 3b chronic kidney disease ([facility]) 2024    Polyneuropathy 2024    Immunosuppression due to drug therapy ([facility]) 12/15/2023    Infection due to ESBL-producing Escherichia coli 2023    Metabolic acidosis 2023    Bradycardia 2023    Low bicarbonate level 2023    BK viremia 2023    Itching 2023    Other hydronephrosis 2023    Long term current use of immunosuppressive drug 2023    Leg swelling 2023    Multifocal pneumonia 2023    Stage 3b chronic kidney disease 2023    Drug-induced constipation 2023    Diabetes mellitus ([facility]) 2022    Essential hypertension 2022    Long-term insulin use ([facility]) 2022    -donor kidney transplant recipient 2022    Lung nodules 10/22/2022    GERD (gastroesophageal reflux disease) 10/19/2022    Normocytic anemia 2022    Secondary hypercoagulable state ([facility]) 2022    Atrial fibrillation ([facility]) 2021    Leukopenia 2021    Chronic heart failure with preserved ejection fraction ([facility]) 2020    Hypothyroidism, acquired 2020    Dental disorder 2020    Coronary artery disease with angina pectoris with documented spasm ([facility])     ESRD (end stage renal disease) ([facility])     Mixed hyperlipidemia 2019    Elevated troponin 2018    RLS (restless legs syndrome) 2016    Type 2 diabetes mellitus with stage 3b chronic kidney disease, with long-term current use of insulin ([facility]) 2016    Primary hypertension 2013       Past Surgical History:  Past  Surgical History:   Procedure Laterality Date    URETERAL REIMPLANTATION  2023    transplant ureter reimplantation - [facility] [place]    OTHER ABDOMINAL SURGERY Right 10/31/2022     Donor kidney transplant - [facility] CARDIAC CATH  2016    RCA stented with 2 Synergy drug-eluting stents.    RECOVERY  2016    Procedure: CATH LAB [facility] WITH POSSIBLE DR. CASTILLO;  Surgeon: Recoveryonly Surgery;  Location: SURGERY PRE-POST PROC UNIT [facility] [place];  Service:     ZZZ CARDIAC CATH  2016    100% RCA    OTHER Left 2014    left arm upper extremity fistula    OTHER ABDOMINAL SURGERY      left kidney removed due to cancer        Allergies:  Patient has no known allergies.     Current Medications:    Current Outpatient Medications:     amLODIPine (NORVASC) 10 MG Tab, TOME FERNANDO TABLETA TODOS [place]ICK, Disp: 90 Tablet, Rfl: 1    amiodarone (CORDARONE) 200 MG Tab, TOME FERNANDO TABLETA TODOS [place], Disp: 90 Tablet, Rfl: 1    Continuous Glucose Sensor (FREESTYLE BALDOMERO 3 SENSOR) Misc, 1 Each every 14 days., Disp: 6 Each, Rfl: 3    Continuous Glucose  (FREESTYLE BALDOMERO 3 READER) Device, 1 Each every day., Disp: 1 Each, Rfl: 0    levothyroxine (SYNTHROID) 88 MCG Tab, Take 1 Tablet by mouth every morning on an empty stomach., Disp: 90 Tablet, Rfl: 3    furosemide (LASIX) 40 MG Tab, TOME 1 TABLETA POR VIA ORAL TODOS [place] MCCORMICK, Disp: 90 Tablet, Rfl: 2    apixaban (ELIQUIS) 2.5mg Tab, Take 1 Tablet by mouth 2 times a day., Disp: 60 Tablet, Rfl: 5    FARXIGA 5 MG Tab, Take 1 Tablet by mouth every day. Por diabetes, Disp: 90 Tablet, Rfl: 3    metoprolol SR (TOPROL XL) 25 MG TABLET SR 24 HR, Take 0.5 Tablets by mouth every day., Disp: 45 Tablet, Rfl: 3    glucose blood (ACCU-CHEK GUIDE) strip, USE ONE COVERED STRIP TO TEST BLOOD SUGAR THREE TIMES DAILY., Disp: 100 Strip, Rfl: 3    Lancets, Use one covered lancet to test blood sugar three times daily., Disp: 100 Each, Rfl: 3    triamcinolone acetonide  (KENALOG) 0.1 % Cream, Aplique bernardo cantidad del tamano de un guisante sobre la piel bernardo vez al fernanda janna sea necesario para la picazon o el sarpullido., Disp: 45 g, Rfl: 2    losartan (COZAAR) 50 MG Tab, Take 1 Tablet by mouth every day., Disp: 90 Tablet, Rfl: 3    atorvastatin (LIPITOR) 20 MG Tab, TAKE 1 TABLET BY MOUTH EVERY DAY IN THE EVENING, Disp: 90 Tablet, Rfl: 3    docusate sodium (COLACE) 100 MG Cap, Take 1 Capsule by mouth 2 times a day as needed for Constipation (Constipation)., Disp: 180 Capsule, Rfl: 3    sodium bicarbonate (SODIUM BICARBONATE) 650 MG Tab, Take 1 Tablet by mouth 2 times a day., Disp: 180 Tablet, Rfl: 3    tacrolimus (PROGRAF) 1 MG Cap, Take 2 Capsules by mouth 2 times a day., Disp: 120 Capsule, Rfl: 0    mycophenolate (CELLCEPT) 250 MG Cap, Take 1 Capsule by mouth 2 times a day., Disp: 20 Capsule, Rfl: 0    insulin aspart (NOVOLOG FLEXPEN) 100 UNIT/ML injection PEN, Inject 0-12 Units under the skin 4 Times a Day,Before Meals and at Bedtime. Unspecified sliding scale., Disp: , Rfl:     predniSONE (DELTASONE) 5 MG Tab, Take 5 mg by mouth every day., Disp: , Rfl:     Social History:  Social History     Socioeconomic History    Marital status:      Spouse name: Not on file    Number of children: Not on file    Years of education: Not on file    Highest education level: Not on file   Occupational History    Not on file   Tobacco Use    Smoking status: Never    Smokeless tobacco: Never   Vaping Use    Vaping status: Never Used   Substance and Sexual Activity    Alcohol use: No     Alcohol/week: 0.0 oz    Drug use: No    Sexual activity: Not Currently   Other Topics Concern    Not on file   Social History Narrative    Not on file     Social Determinants of Health     Financial Resource Strain: Low Risk  (4/9/2024)    Overall Financial Resource Strain (CARDIA)     Difficulty of Paying Living Expenses: Not very hard   Food Insecurity: No Food Insecurity (4/9/2024)    Hunger Vital Sign      "Worried About Running Out of Food in the Last Year: Never true     Ran Out of Food in the Last Year: Never true   Transportation Needs: No Transportation Needs (4/9/2024)    PRAPARE - Transportation     Lack of Transportation (Medical): No     Lack of Transportation (Non-Medical): No   Physical Activity: Inactive (4/9/2024)    Exercise Vital Sign     Days of Exercise per Week: 0 days     Minutes of Exercise per Session: 0 min   Stress: No Stress Concern Present (4/9/2024)    [employer] [place] [employer] - Occupational Stress Questionnaire     Feeling of Stress : Only a little   Social Connections: Moderately Isolated (4/9/2024)    Social Connection and Isolation Panel [NHANES]     Frequency of Communication with Friends and Family: More than three times a week     Frequency of Social Gatherings with Friends and Family: More than three times a week     Attends Denominational Services: Never     Active Member of Clubs or Organizations: No     Attends Club or Organization Meetings: Never     Marital Status:    Intimate Partner Violence: Not At Risk (4/9/2024)    Humiliation, Afraid, Rape, and Kick questionnaire     Fear of Current or Ex-Partner: No     Emotionally Abused: No     Physically Abused: No     Sexually Abused: No   Housing Stability: Low Risk  (4/9/2024)    Housing Stability Vital Sign     Unable to Pay for Housing in the Last Year: No     Number of Places Lived in the Last Year: 1     Unstable Housing in the Last Year: No        Family History:   Family History   Problem Relation Age of Onset    Diabetes Sister     Other Sister         liver disease    Diabetes Brother     Heart Disease Neg Hx        PHYSICAL EXAM:   Vital signs: BP (!) 140/50 (BP Location: Right arm, Patient Position: Sitting)   Pulse 63   Resp 17   Ht 1.6 m (5' 3\")   Wt 58.6 kg (129 lb 1.6 oz)   LMP  (LMP Unknown)   SpO2 93%   BMI 22.87 kg/m²   GENERAL: Well-developed, well-nourished  in no apparent distress.   FOOT: " Normal sensation to monofilament testing, normal pulses, no ulcers.  Normal Vibration quantitative sensation test.    Labs:  Lab Results   Component Value Date/Time    HBA1C 5.7 (A) 12/08/2021 1520    AVGLUC 111 04/28/2021 0937     Lab Results   Component Value Date/Time    CHOLSTRLTOT 125 09/29/2020 1056    TRIGLYCERIDE 113 09/29/2020 1056    HDL 48 09/29/2020 1056    LDL 54 09/29/2020 1056       Lab Results   Component Value Date/Time    MALBCRT 59 (H) 07/19/2024 07:29 AM    MICROALBUR 4.6 07/19/2024 07:29 AM        Lab Results   Component Value Date/Time    TSHULTRASEN 4.150 08/12/2021 0201     Lab Results   Component Value Date/Time    FREEDIR 1.52 01/28/2021 0700         ASSESSMENT/PLAN:   1. Type 2 diabetes mellitus with other specified complication, with long-term current use of insulin (HCC)  Unstable with an A1c of 8.8%  Diabetes management:  Lantus 15 units in BId- change to 10 units BID  Novolg 10 units/before meals  - change to novolog 15 units before each meal  - Exercise for least 150 minutes/week  - Stable hydration  - Daily feet check  - Maintain a healthy diet, minimal carbohydrate, portion control   CGM downloaded and discussed  - POCT Hemoglobin A1C    2. Dyslipidemia  Unstable  Continue regimen-HPI    3. Polyneuropathy  Stable  Continue appointment-HPI    4. ESRD (end stage renal disease) (HCC)  Unstable  Followed by nephrology    5. Essential hypertension  Unstable  /50  Followed by cardiology    6. Cardiovascular disease  Stable  Follow-up cardiology    7. Hypothyroidism, acquired  Stable  Medication:  Levothyroxine 88 mcg daily - continue  Patient understands to take the medication on empty stomach.   - TSH; Future  - FREE THYROXINE; Future    8. Vitamin D deficiency  Unstable  Followed by nephrology  - VITAMIN D,25 HYDROXY (DEFICIENCY); Future    9. Secondary hyperparathyroidism (HCC)  This is due to ESRD.   We will continue to monitor.   - Comp Metabolic Panel; Future  - PTH INTACT  (PTH ONLY); Future     Disposition: Return in about 3 months (around 12/11/2024).   Do blood work 2 week prior to next appointment with me    Thank you kindly for allowing me to participate in the diabetes care plan for this patient.    ANGELITA Avalos  9/11/24      CC:   ANGELITA Concepcion

## 2024-09-16 ENCOUNTER — DOCUMENTATION (OUTPATIENT)
Dept: VASCULAR LAB | Facility: MEDICAL CENTER | Age: 74
End: 2024-09-16
Payer: MEDICARE

## 2024-09-17 NOTE — PROGRESS NOTES
Utilized translation services for this encounter.  Language Line - [phone]  [employer] ID - [id number]   Keisha, ID # [id number]    Patient missed her Eliquis f/u appt today in the anticoagulation clinic. Called pt and left a vm asking her to call us at [phone] to reschedule.    Ana Luisa CHAPA

## 2024-09-22 ENCOUNTER — HOSPITAL ENCOUNTER (INPATIENT)
Facility: MEDICAL CENTER | Age: 74
LOS: 2 days | DRG: 641 | End: 2024-09-25
Attending: STUDENT IN AN ORGANIZED HEALTH CARE EDUCATION/TRAINING PROGRAM | Admitting: STUDENT IN AN ORGANIZED HEALTH CARE EDUCATION/TRAINING PROGRAM
Payer: MEDICARE

## 2024-09-22 DIAGNOSIS — E87.5 HYPERKALEMIA: ICD-10-CM

## 2024-09-22 DIAGNOSIS — J18.9 PNEUMONIA OF LEFT LOWER LOBE DUE TO INFECTIOUS ORGANISM: ICD-10-CM

## 2024-09-22 DIAGNOSIS — R53.1 WEAKNESS: ICD-10-CM

## 2024-09-22 DIAGNOSIS — N18.9 CHRONIC KIDNEY DISEASE, UNSPECIFIED CKD STAGE: ICD-10-CM

## 2024-09-22 DIAGNOSIS — R11.2 NAUSEA AND VOMITING, UNSPECIFIED VOMITING TYPE: ICD-10-CM

## 2024-09-22 PROCEDURE — 0241U HCHG SARS-COV-2 COVID-19 NFCT DS RESP RNA 4 TRGT ED POC: CPT

## 2024-09-22 PROCEDURE — 99285 EMERGENCY DEPT VISIT HI MDM: CPT

## 2024-09-22 PROCEDURE — 36415 COLL VENOUS BLD VENIPUNCTURE: CPT

## 2024-09-22 ASSESSMENT — FIBROSIS 4 INDEX: FIB4 SCORE: 2.27

## 2024-09-22 ASSESSMENT — PAIN DESCRIPTION - PAIN TYPE: TYPE: ACUTE PAIN

## 2024-09-23 ENCOUNTER — APPOINTMENT (OUTPATIENT)
Dept: RADIOLOGY | Facility: MEDICAL CENTER | Age: 74
DRG: 641 | End: 2024-09-23
Attending: STUDENT IN AN ORGANIZED HEALTH CARE EDUCATION/TRAINING PROGRAM
Payer: MEDICARE

## 2024-09-23 PROBLEM — E87.5 HYPERKALEMIA: Status: ACTIVE | Noted: 2024-09-23

## 2024-09-23 PROBLEM — B34.9 VIRAL ILLNESS: Status: ACTIVE | Noted: 2024-09-23

## 2024-09-23 LAB
ALBUMIN SERPL BCP-MCNC: 4.1 G/DL (ref 3.2–4.9)
ALBUMIN/GLOB SERPL: 1.2 G/DL
ALP SERPL-CCNC: 89 U/L (ref 30–99)
ALT SERPL-CCNC: 17 U/L (ref 2–50)
ANION GAP SERPL CALC-SCNC: 11 MMOL/L (ref 7–16)
ANION GAP SERPL CALC-SCNC: 13 MMOL/L (ref 7–16)
AST SERPL-CCNC: 17 U/L (ref 12–45)
B PARAP IS1001 DNA NPH QL NAA+NON-PROBE: NOT DETECTED
B PERT.PT PRMT NPH QL NAA+NON-PROBE: NOT DETECTED
BASOPHILS # BLD AUTO: 0.1 % (ref 0–1.8)
BASOPHILS # BLD: 0.01 K/UL (ref 0–0.12)
BILIRUB SERPL-MCNC: 0.4 MG/DL (ref 0.1–1.5)
BUN SERPL-MCNC: 36 MG/DL (ref 8–22)
BUN SERPL-MCNC: 36 MG/DL (ref 8–22)
C PNEUM DNA NPH QL NAA+NON-PROBE: NOT DETECTED
CALCIUM ALBUM COR SERPL-MCNC: 10.3 MG/DL (ref 8.5–10.5)
CALCIUM SERPL-MCNC: 10.4 MG/DL (ref 8.5–10.5)
CALCIUM SERPL-MCNC: 9.7 MG/DL (ref 8.5–10.5)
CHLORIDE SERPL-SCNC: 106 MMOL/L (ref 96–112)
CHLORIDE SERPL-SCNC: 107 MMOL/L (ref 96–112)
CO2 SERPL-SCNC: 16 MMOL/L (ref 20–33)
CO2 SERPL-SCNC: 19 MMOL/L (ref 20–33)
CREAT SERPL-MCNC: 1.73 MG/DL (ref 0.5–1.4)
CREAT SERPL-MCNC: 1.86 MG/DL (ref 0.5–1.4)
EKG IMPRESSION: NORMAL
EKG IMPRESSION: NORMAL
EOSINOPHIL # BLD AUTO: 0.03 K/UL (ref 0–0.51)
EOSINOPHIL NFR BLD: 0.3 % (ref 0–6.9)
ERYTHROCYTE [DISTWIDTH] IN BLOOD BY AUTOMATED COUNT: 46 FL (ref 35.9–50)
FLUAV RNA NPH QL NAA+NON-PROBE: NOT DETECTED
FLUAV RNA SPEC QL NAA+PROBE: NEGATIVE
FLUBV RNA NPH QL NAA+NON-PROBE: NOT DETECTED
FLUBV RNA SPEC QL NAA+PROBE: NEGATIVE
GFR SERPLBLD CREATININE-BSD FMLA CKD-EPI: 28 ML/MIN/1.73 M 2
GFR SERPLBLD CREATININE-BSD FMLA CKD-EPI: 30 ML/MIN/1.73 M 2
GLOBULIN SER CALC-MCNC: 3.4 G/DL (ref 1.9–3.5)
GLUCOSE BLD STRIP.AUTO-MCNC: 113 MG/DL (ref 65–99)
GLUCOSE BLD STRIP.AUTO-MCNC: 122 MG/DL (ref 65–99)
GLUCOSE BLD STRIP.AUTO-MCNC: 134 MG/DL (ref 65–99)
GLUCOSE BLD STRIP.AUTO-MCNC: 191 MG/DL (ref 65–99)
GLUCOSE BLD STRIP.AUTO-MCNC: 201 MG/DL (ref 65–99)
GLUCOSE BLD STRIP.AUTO-MCNC: 208 MG/DL (ref 65–99)
GLUCOSE BLD STRIP.AUTO-MCNC: 208 MG/DL (ref 65–99)
GLUCOSE BLD STRIP.AUTO-MCNC: 215 MG/DL (ref 65–99)
GLUCOSE SERPL-MCNC: 137 MG/DL (ref 65–99)
GLUCOSE SERPL-MCNC: 187 MG/DL (ref 65–99)
HADV DNA NPH QL NAA+NON-PROBE: NOT DETECTED
HCOV 229E RNA NPH QL NAA+NON-PROBE: NOT DETECTED
HCOV HKU1 RNA NPH QL NAA+NON-PROBE: NOT DETECTED
HCOV NL63 RNA NPH QL NAA+NON-PROBE: NOT DETECTED
HCOV OC43 RNA NPH QL NAA+NON-PROBE: NOT DETECTED
HCT VFR BLD AUTO: 41.8 % (ref 37–47)
HGB BLD-MCNC: 12.7 G/DL (ref 12–16)
HMPV RNA NPH QL NAA+NON-PROBE: NOT DETECTED
HPIV1 RNA NPH QL NAA+NON-PROBE: NOT DETECTED
HPIV2 RNA NPH QL NAA+NON-PROBE: NOT DETECTED
HPIV3 RNA NPH QL NAA+NON-PROBE: NOT DETECTED
HPIV4 RNA NPH QL NAA+NON-PROBE: NOT DETECTED
IMM GRANULOCYTES # BLD AUTO: 0.04 K/UL (ref 0–0.11)
IMM GRANULOCYTES NFR BLD AUTO: 0.4 % (ref 0–0.9)
LYMPHOCYTES # BLD AUTO: 0.17 K/UL (ref 1–4.8)
LYMPHOCYTES NFR BLD: 1.8 % (ref 22–41)
M PNEUMO DNA NPH QL NAA+NON-PROBE: NOT DETECTED
MCH RBC QN AUTO: 26.4 PG (ref 27–33)
MCHC RBC AUTO-ENTMCNC: 30.4 G/DL (ref 32.2–35.5)
MCV RBC AUTO: 86.9 FL (ref 81.4–97.8)
MONOCYTES # BLD AUTO: 0.51 K/UL (ref 0–0.85)
MONOCYTES NFR BLD AUTO: 5.4 % (ref 0–13.4)
NEUTROPHILS # BLD AUTO: 8.73 K/UL (ref 1.82–7.42)
NEUTROPHILS NFR BLD: 92 % (ref 44–72)
NRBC # BLD AUTO: 0 K/UL
NRBC BLD-RTO: 0 /100 WBC (ref 0–0.2)
PLATELET # BLD AUTO: 236 K/UL (ref 164–446)
PMV BLD AUTO: 11 FL (ref 9–12.9)
POTASSIUM SERPL-SCNC: 5.9 MMOL/L (ref 3.6–5.5)
POTASSIUM SERPL-SCNC: 6.7 MMOL/L (ref 3.6–5.5)
PROCALCITONIN SERPL-MCNC: 0.06 NG/ML
PROT SERPL-MCNC: 7.5 G/DL (ref 6–8.2)
RBC # BLD AUTO: 4.81 M/UL (ref 4.2–5.4)
RSV RNA NPH QL NAA+NON-PROBE: NOT DETECTED
RSV RNA SPEC QL NAA+PROBE: NEGATIVE
RV+EV RNA NPH QL NAA+NON-PROBE: NOT DETECTED
SARS-COV-2 RNA NPH QL NAA+NON-PROBE: NOTDETECTED
SARS-COV-2 RNA RESP QL NAA+PROBE: NOTDETECTED
SODIUM SERPL-SCNC: 136 MMOL/L (ref 135–145)
SODIUM SERPL-SCNC: 136 MMOL/L (ref 135–145)
WBC # BLD AUTO: 9.5 K/UL (ref 4.8–10.8)

## 2024-09-23 PROCEDURE — 96367 TX/PROPH/DG ADDL SEQ IV INF: CPT

## 2024-09-23 PROCEDURE — 700105 HCHG RX REV CODE 258: Performed by: STUDENT IN AN ORGANIZED HEALTH CARE EDUCATION/TRAINING PROGRAM

## 2024-09-23 PROCEDURE — 84145 PROCALCITONIN (PCT): CPT

## 2024-09-23 PROCEDURE — 700111 HCHG RX REV CODE 636 W/ 250 OVERRIDE (IP): Performed by: STUDENT IN AN ORGANIZED HEALTH CARE EDUCATION/TRAINING PROGRAM

## 2024-09-23 PROCEDURE — 36415 COLL VENOUS BLD VENIPUNCTURE: CPT

## 2024-09-23 PROCEDURE — A9270 NON-COVERED ITEM OR SERVICE: HCPCS | Mod: UD | Performed by: STUDENT IN AN ORGANIZED HEALTH CARE EDUCATION/TRAINING PROGRAM

## 2024-09-23 PROCEDURE — 700111 HCHG RX REV CODE 636 W/ 250 OVERRIDE (IP): Performed by: INTERNAL MEDICINE

## 2024-09-23 PROCEDURE — 700105 HCHG RX REV CODE 258: Performed by: INTERNAL MEDICINE

## 2024-09-23 PROCEDURE — 93010 ELECTROCARDIOGRAM REPORT: CPT | Performed by: INTERNAL MEDICINE

## 2024-09-23 PROCEDURE — 80048 BASIC METABOLIC PNL TOTAL CA: CPT

## 2024-09-23 PROCEDURE — 82962 GLUCOSE BLOOD TEST: CPT

## 2024-09-23 PROCEDURE — 700102 HCHG RX REV CODE 250 W/ 637 OVERRIDE(OP): Performed by: INTERNAL MEDICINE

## 2024-09-23 PROCEDURE — 99291 CRITICAL CARE FIRST HOUR: CPT | Performed by: STUDENT IN AN ORGANIZED HEALTH CARE EDUCATION/TRAINING PROGRAM

## 2024-09-23 PROCEDURE — 0202U NFCT DS 22 TRGT SARS-COV-2: CPT

## 2024-09-23 PROCEDURE — 96365 THER/PROPH/DIAG IV INF INIT: CPT

## 2024-09-23 PROCEDURE — 85025 COMPLETE CBC W/AUTO DIFF WBC: CPT

## 2024-09-23 PROCEDURE — A9270 NON-COVERED ITEM OR SERVICE: HCPCS | Performed by: STUDENT IN AN ORGANIZED HEALTH CARE EDUCATION/TRAINING PROGRAM

## 2024-09-23 PROCEDURE — 99222 1ST HOSP IP/OBS MODERATE 55: CPT | Performed by: INTERNAL MEDICINE

## 2024-09-23 PROCEDURE — 71045 X-RAY EXAM CHEST 1 VIEW: CPT

## 2024-09-23 PROCEDURE — 93005 ELECTROCARDIOGRAM TRACING: CPT | Performed by: INTERNAL MEDICINE

## 2024-09-23 PROCEDURE — 770020 HCHG ROOM/CARE - TELE (206)

## 2024-09-23 PROCEDURE — 700111 HCHG RX REV CODE 636 W/ 250 OVERRIDE (IP): Mod: JZ,UD | Performed by: STUDENT IN AN ORGANIZED HEALTH CARE EDUCATION/TRAINING PROGRAM

## 2024-09-23 PROCEDURE — 700101 HCHG RX REV CODE 250: Performed by: INTERNAL MEDICINE

## 2024-09-23 PROCEDURE — 700102 HCHG RX REV CODE 250 W/ 637 OVERRIDE(OP): Performed by: STUDENT IN AN ORGANIZED HEALTH CARE EDUCATION/TRAINING PROGRAM

## 2024-09-23 PROCEDURE — A9270 NON-COVERED ITEM OR SERVICE: HCPCS | Performed by: INTERNAL MEDICINE

## 2024-09-23 PROCEDURE — 700101 HCHG RX REV CODE 250: Mod: UD | Performed by: STUDENT IN AN ORGANIZED HEALTH CARE EDUCATION/TRAINING PROGRAM

## 2024-09-23 PROCEDURE — 80053 COMPREHEN METABOLIC PANEL: CPT

## 2024-09-23 PROCEDURE — 76705 ECHO EXAM OF ABDOMEN: CPT

## 2024-09-23 PROCEDURE — 96375 TX/PRO/DX INJ NEW DRUG ADDON: CPT

## 2024-09-23 PROCEDURE — 700102 HCHG RX REV CODE 250 W/ 637 OVERRIDE(OP): Mod: UD | Performed by: STUDENT IN AN ORGANIZED HEALTH CARE EDUCATION/TRAINING PROGRAM

## 2024-09-23 PROCEDURE — 93005 ELECTROCARDIOGRAM TRACING: CPT | Performed by: STUDENT IN AN ORGANIZED HEALTH CARE EDUCATION/TRAINING PROGRAM

## 2024-09-23 RX ORDER — INSULIN LISPRO 100 [IU]/ML
2-9 INJECTION, SOLUTION INTRAVENOUS; SUBCUTANEOUS EVERY 6 HOURS
Status: DISCONTINUED | OUTPATIENT
Start: 2024-09-23 | End: 2024-09-25 | Stop reason: HOSPADM

## 2024-09-23 RX ORDER — FUROSEMIDE 40 MG
40 TABLET ORAL DAILY
Status: DISCONTINUED | OUTPATIENT
Start: 2024-09-23 | End: 2024-09-23

## 2024-09-23 RX ORDER — SODIUM BICARBONATE 650 MG/1
650 TABLET ORAL 2 TIMES DAILY
Status: DISCONTINUED | OUTPATIENT
Start: 2024-09-23 | End: 2024-09-25 | Stop reason: HOSPADM

## 2024-09-23 RX ORDER — LEVOTHYROXINE SODIUM 88 UG/1
88 TABLET ORAL
Status: DISCONTINUED | OUTPATIENT
Start: 2024-09-23 | End: 2024-09-25 | Stop reason: HOSPADM

## 2024-09-23 RX ORDER — AMIODARONE HYDROCHLORIDE 200 MG/1
200 TABLET ORAL DAILY
Status: DISCONTINUED | OUTPATIENT
Start: 2024-09-23 | End: 2024-09-25 | Stop reason: HOSPADM

## 2024-09-23 RX ORDER — ONDANSETRON 2 MG/ML
4 INJECTION INTRAMUSCULAR; INTRAVENOUS ONCE
Status: COMPLETED | OUTPATIENT
Start: 2024-09-23 | End: 2024-09-23

## 2024-09-23 RX ORDER — HEPARIN SODIUM 5000 [USP'U]/ML
5000 INJECTION, SOLUTION INTRAVENOUS; SUBCUTANEOUS EVERY 8 HOURS
Status: DISCONTINUED | OUTPATIENT
Start: 2024-09-23 | End: 2024-09-23

## 2024-09-23 RX ORDER — ATORVASTATIN CALCIUM 20 MG/1
20 TABLET, FILM COATED ORAL EVERY EVENING
Status: DISCONTINUED | OUTPATIENT
Start: 2024-09-23 | End: 2024-09-25 | Stop reason: HOSPADM

## 2024-09-23 RX ORDER — METOPROLOL SUCCINATE 25 MG/1
12.5 TABLET, EXTENDED RELEASE ORAL DAILY
Status: DISCONTINUED | OUTPATIENT
Start: 2024-09-23 | End: 2024-09-25 | Stop reason: HOSPADM

## 2024-09-23 RX ORDER — TACROLIMUS 1 MG/1
2 CAPSULE ORAL 2 TIMES DAILY
Status: DISCONTINUED | OUTPATIENT
Start: 2024-09-23 | End: 2024-09-23

## 2024-09-23 RX ORDER — AMLODIPINE BESYLATE 10 MG/1
10 TABLET ORAL DAILY
Status: DISCONTINUED | OUTPATIENT
Start: 2024-09-23 | End: 2024-09-25 | Stop reason: HOSPADM

## 2024-09-23 RX ORDER — PREDNISONE 5 MG/1
5 TABLET ORAL DAILY
Status: DISCONTINUED | OUTPATIENT
Start: 2024-09-23 | End: 2024-09-25 | Stop reason: HOSPADM

## 2024-09-23 RX ORDER — DEXTROSE MONOHYDRATE 25 G/50ML
25 INJECTION, SOLUTION INTRAVENOUS ONCE
Status: COMPLETED | OUTPATIENT
Start: 2024-09-23 | End: 2024-09-23

## 2024-09-23 RX ORDER — FUROSEMIDE 10 MG/ML
40 INJECTION INTRAMUSCULAR; INTRAVENOUS ONCE
Status: COMPLETED | OUTPATIENT
Start: 2024-09-23 | End: 2024-09-23

## 2024-09-23 RX ORDER — LABETALOL HYDROCHLORIDE 5 MG/ML
10 INJECTION, SOLUTION INTRAVENOUS EVERY 4 HOURS PRN
Status: DISCONTINUED | OUTPATIENT
Start: 2024-09-23 | End: 2024-09-25 | Stop reason: HOSPADM

## 2024-09-23 RX ORDER — DAPAGLIFLOZIN 10 MG/1
5 TABLET, FILM COATED ORAL DAILY
Status: DISCONTINUED | OUTPATIENT
Start: 2024-09-23 | End: 2024-09-25 | Stop reason: HOSPADM

## 2024-09-23 RX ORDER — SODIUM CHLORIDE 9 MG/ML
INJECTION, SOLUTION INTRAVENOUS CONTINUOUS
Status: DISCONTINUED | OUTPATIENT
Start: 2024-09-23 | End: 2024-09-23

## 2024-09-23 RX ORDER — CALCIUM GLUCONATE 20 MG/ML
2 INJECTION, SOLUTION INTRAVENOUS ONCE
Status: COMPLETED | OUTPATIENT
Start: 2024-09-23 | End: 2024-09-23

## 2024-09-23 RX ORDER — TACROLIMUS 1 MG/1
2 CAPSULE ORAL EVERY EVENING
Status: DISCONTINUED | OUTPATIENT
Start: 2024-09-23 | End: 2024-09-25 | Stop reason: HOSPADM

## 2024-09-23 RX ORDER — DOXYCYCLINE 100 MG/1
100 TABLET ORAL ONCE
Status: COMPLETED | OUTPATIENT
Start: 2024-09-23 | End: 2024-09-23

## 2024-09-23 RX ORDER — ASPIRIN 81 MG/1
81 TABLET ORAL DAILY
Status: DISCONTINUED | OUTPATIENT
Start: 2024-09-23 | End: 2024-09-25 | Stop reason: HOSPADM

## 2024-09-23 RX ORDER — DEXTROSE MONOHYDRATE 25 G/50ML
25 INJECTION, SOLUTION INTRAVENOUS
Status: DISCONTINUED | OUTPATIENT
Start: 2024-09-23 | End: 2024-09-25 | Stop reason: HOSPADM

## 2024-09-23 RX ORDER — ACETAMINOPHEN 325 MG/1
650 TABLET ORAL EVERY 6 HOURS PRN
Status: DISCONTINUED | OUTPATIENT
Start: 2024-09-23 | End: 2024-09-25 | Stop reason: HOSPADM

## 2024-09-23 RX ORDER — TACROLIMUS 1 MG/1
1 CAPSULE ORAL
Status: DISCONTINUED | OUTPATIENT
Start: 2024-09-24 | End: 2024-09-25 | Stop reason: HOSPADM

## 2024-09-23 RX ORDER — MYCOPHENOLATE MOFETIL 250 MG/1
250 CAPSULE ORAL 2 TIMES DAILY
Status: DISCONTINUED | OUTPATIENT
Start: 2024-09-23 | End: 2024-09-25 | Stop reason: HOSPADM

## 2024-09-23 RX ORDER — FUROSEMIDE 40 MG
40 TABLET ORAL
Status: DISCONTINUED | OUTPATIENT
Start: 2024-09-23 | End: 2024-09-25 | Stop reason: HOSPADM

## 2024-09-23 RX ADMIN — INSULIN HUMAN 5 UNITS: 100 INJECTION, SOLUTION PARENTERAL at 16:38

## 2024-09-23 RX ADMIN — SODIUM ZIRCONIUM CYCLOSILICATE 10 G: 10 POWDER, FOR SUSPENSION ORAL at 03:30

## 2024-09-23 RX ADMIN — DOXYCYCLINE 100 MG: 100 TABLET, FILM COATED ORAL at 02:34

## 2024-09-23 RX ADMIN — ASPIRIN 81 MG: 81 TABLET, COATED ORAL at 05:41

## 2024-09-23 RX ADMIN — INSULIN GLARGINE-YFGN 10 UNITS: 100 INJECTION, SOLUTION SUBCUTANEOUS at 17:51

## 2024-09-23 RX ADMIN — METOPROLOL SUCCINATE 12.5 MG: 25 TABLET, EXTENDED RELEASE ORAL at 05:41

## 2024-09-23 RX ADMIN — INSULIN LISPRO 3 UNITS: 100 INJECTION, SOLUTION INTRAVENOUS; SUBCUTANEOUS at 11:43

## 2024-09-23 RX ADMIN — SODIUM BICARBONATE 75 MEQ: 84 INJECTION, SOLUTION INTRAVENOUS at 08:42

## 2024-09-23 RX ADMIN — LEVOTHYROXINE SODIUM 88 MCG: 0.09 TABLET ORAL at 05:41

## 2024-09-23 RX ADMIN — ONDANSETRON 4 MG: 2 INJECTION INTRAMUSCULAR; INTRAVENOUS at 02:08

## 2024-09-23 RX ADMIN — SODIUM CHLORIDE: 9 INJECTION, SOLUTION INTRAVENOUS at 04:13

## 2024-09-23 RX ADMIN — APIXABAN 2.5 MG: 2.5 TABLET, FILM COATED ORAL at 17:46

## 2024-09-23 RX ADMIN — INSULIN LISPRO 3 UNITS: 100 INJECTION, SOLUTION INTRAVENOUS; SUBCUTANEOUS at 17:52

## 2024-09-23 RX ADMIN — DEXTROSE MONOHYDRATE 25 G: 25 INJECTION, SOLUTION INTRAVENOUS at 16:34

## 2024-09-23 RX ADMIN — AMPICILLIN AND SULBACTAM 3 G: 1; 2 INJECTION, POWDER, FOR SOLUTION INTRAMUSCULAR; INTRAVENOUS at 03:34

## 2024-09-23 RX ADMIN — AMLODIPINE BESYLATE 10 MG: 10 TABLET ORAL at 05:41

## 2024-09-23 RX ADMIN — MYCOPHENOLATE MOFETIL 250 MG: 250 CAPSULE ORAL at 08:06

## 2024-09-23 RX ADMIN — PREDNISONE 5 MG: 5 TABLET ORAL at 05:42

## 2024-09-23 RX ADMIN — FUROSEMIDE 40 MG: 10 INJECTION, SOLUTION INTRAVENOUS at 02:40

## 2024-09-23 RX ADMIN — SODIUM BICARBONATE 650 MG: 650 TABLET ORAL at 17:46

## 2024-09-23 RX ADMIN — SODIUM BICARBONATE 75 MEQ: 84 INJECTION, SOLUTION INTRAVENOUS at 21:51

## 2024-09-23 RX ADMIN — ATORVASTATIN CALCIUM 20 MG: 20 TABLET, FILM COATED ORAL at 17:46

## 2024-09-23 RX ADMIN — APIXABAN 2.5 MG: 2.5 TABLET, FILM COATED ORAL at 05:41

## 2024-09-23 RX ADMIN — SODIUM BICARBONATE 650 MG: 650 TABLET ORAL at 05:41

## 2024-09-23 RX ADMIN — INSULIN HUMAN 5 UNITS: 100 INJECTION, SOLUTION PARENTERAL at 02:16

## 2024-09-23 RX ADMIN — CALCIUM GLUCONATE 2 G: 20 INJECTION, SOLUTION INTRAVENOUS at 02:22

## 2024-09-23 RX ADMIN — TACROLIMUS 2 MG: 1 CAPSULE ORAL at 17:46

## 2024-09-23 RX ADMIN — MYCOPHENOLATE MOFETIL 250 MG: 250 CAPSULE ORAL at 17:46

## 2024-09-23 RX ADMIN — DEXTROSE MONOHYDRATE 25 G: 25 INJECTION, SOLUTION INTRAVENOUS at 02:08

## 2024-09-23 RX ADMIN — FUROSEMIDE 40 MG: 40 TABLET ORAL at 10:23

## 2024-09-23 RX ADMIN — TACROLIMUS 2 MG: 1 CAPSULE ORAL at 05:40

## 2024-09-23 RX ADMIN — DAPAGLIFLOZIN 5 MG: 10 TABLET, FILM COATED ORAL at 05:40

## 2024-09-23 RX ADMIN — AMIODARONE HYDROCHLORIDE 200 MG: 200 TABLET ORAL at 05:42

## 2024-09-23 ASSESSMENT — COGNITIVE AND FUNCTIONAL STATUS - GENERAL
MOBILITY SCORE: 24
SUGGESTED CMS G CODE MODIFIER MOBILITY: CH
DAILY ACTIVITIY SCORE: 24
SUGGESTED CMS G CODE MODIFIER DAILY ACTIVITY: CH

## 2024-09-23 ASSESSMENT — CHA2DS2 SCORE
SEX: FEMALE
CHA2DS2 VASC SCORE: 6
AGE 65 TO 74: YES
SEX: FEMALE
AGE 75 OR GREATER: NO
DIABETES: YES
VASCULAR DISEASE: YES
AGE 75 OR GREATER: NO
VASCULAR DISEASE: NO
DIABETES: YES
CHA2DS2 VASC SCORE: 5
AGE 65 TO 74: YES
CHF OR LEFT VENTRICULAR DYSFUNCTION: YES
PRIOR STROKE OR TIA OR THROMBOEMBOLISM: NO
HYPERTENSION: YES
CHF OR LEFT VENTRICULAR DYSFUNCTION: YES
HYPERTENSION: YES
PRIOR STROKE OR TIA OR THROMBOEMBOLISM: NO

## 2024-09-23 ASSESSMENT — ENCOUNTER SYMPTOMS
SORE THROAT: 1
VOMITING: 1
NAUSEA: 1
CHILLS: 1
COUGH: 1
DIARRHEA: 0
ABDOMINAL PAIN: 0
SHORTNESS OF BREATH: 0
FOCAL WEAKNESS: 1
FEVER: 0

## 2024-09-23 ASSESSMENT — SOCIAL DETERMINANTS OF HEALTH (SDOH)
WITHIN THE LAST YEAR, HAVE YOU BEEN KICKED, HIT, SLAPPED, OR OTHERWISE PHYSICALLY HURT BY YOUR PARTNER OR EX-PARTNER?: NO
WITHIN THE LAST YEAR, HAVE TO BEEN RAPED OR FORCED TO HAVE ANY KIND OF SEXUAL ACTIVITY BY YOUR PARTNER OR EX-PARTNER?: NO
WITHIN THE LAST YEAR, HAVE YOU BEEN AFRAID OF YOUR PARTNER OR EX-PARTNER?: NO
WITHIN THE LAST YEAR, HAVE YOU BEEN HUMILIATED OR EMOTIONALLY ABUSED IN OTHER WAYS BY YOUR PARTNER OR EX-PARTNER?: NO

## 2024-09-23 ASSESSMENT — LIFESTYLE VARIABLES
ALCOHOL_USE: NO
HAVE PEOPLE ANNOYED YOU BY CRITICIZING YOUR DRINKING: NO
ON A TYPICAL DAY WHEN YOU DRINK ALCOHOL HOW MANY DRINKS DO YOU HAVE: 0
EVER FELT BAD OR GUILTY ABOUT YOUR DRINKING: NO
TOTAL SCORE: 0
HOW MANY TIMES IN THE PAST YEAR HAVE YOU HAD 5 OR MORE DRINKS IN A DAY: 0
EVER HAD A DRINK FIRST THING IN THE MORNING TO STEADY YOUR NERVES TO GET RID OF A HANGOVER: NO
TOTAL SCORE: 0
CONSUMPTION TOTAL: NEGATIVE
DOES PATIENT WANT TO STOP DRINKING: CANNOT ASSESS
HAVE YOU EVER FELT YOU SHOULD CUT DOWN ON YOUR DRINKING: NO
TOTAL SCORE: 0
AVERAGE NUMBER OF DAYS PER WEEK YOU HAVE A DRINK CONTAINING ALCOHOL: 0

## 2024-09-23 ASSESSMENT — PATIENT HEALTH QUESTIONNAIRE - PHQ9
1. LITTLE INTEREST OR PLEASURE IN DOING THINGS: NOT AT ALL
SUM OF ALL RESPONSES TO PHQ9 QUESTIONS 1 AND 2: 0
2. FEELING DOWN, DEPRESSED, IRRITABLE, OR HOPELESS: NOT AT ALL
2. FEELING DOWN, DEPRESSED, IRRITABLE, OR HOPELESS: NOT AT ALL
SUM OF ALL RESPONSES TO PHQ9 QUESTIONS 1 AND 2: 0
1. LITTLE INTEREST OR PLEASURE IN DOING THINGS: NOT AT ALL

## 2024-09-23 ASSESSMENT — FIBROSIS 4 INDEX: FIB4 SCORE: 1.29

## 2024-09-23 NOTE — PROGRESS NOTES
Received report from ED nurse. Pt arrived to unit at 0517 via gurney escorted by SANTA Yost. Assessment complete. VSS. No signs of distress noted at this time. Tele monitor in place. Monitor room notified. Pt c/o pain 0/10. Fall precautions and appropriate signs in place. Pt oriented to unit routine, call light/phone system within reach. Personal belongings within reach. Pt educated regarding fall precautions. Bed alarm is on. Safety and fall precautions in place. Pt denies any further needs at this time.

## 2024-09-23 NOTE — PROGRESS NOTES
4 Eyes Skin Assessment Completed by TRAVIS Huang and Lm RN.    Head WDL  Ears WDL  Nose WDL  Mouth WDL  Neck WDL  Breast/Chest WDL  Shoulder Blades WDL  Spine WDL  (R) Arm/Elbow/Hand WDL  (L) Arm/Elbow/Hand fistula  Abdomen WDL  Groin refused  Scrotum/Coccyx/Buttocks WDL  (R) Leg WDL  (L) Leg WDL  (R) Heel/Foot/Toe WDL  (L) Heel/Foot/Toe WDL          Devices In Places bp cuff, tele box, NC       Interventions In Place Gray Ear Foams and Pillows    Possible Skin Injury No    Pictures Uploaded Into Epic No, needs to be completed  Wound Consult Placed N/A  RN Wound Prevention Protocol Ordered No

## 2024-09-23 NOTE — ED PROVIDER NOTES
CHIEF COMPLAINT  Chief Complaint   Patient presents with    Flu Like Symptoms    Vomiting       LIMITATION TO HISTORY   Select:     ANDRES Gallegos is a 73-year-old female with a complex medical history, including ESRD on dialysis, CAD, diabetes, and a kidney transplant, presenting to the ED with flu-like symptoms and persistent vomiting for the past 24 hours. She denies fever, diarrhea, or abdominal pain but notes generalized weakness and malaise.  She denies chest pain but reports some shortness of breath reports fatigue reports right upper quadrant abdominal pain she reports no recent changes in her medications. She denies chest pain, shortness of breath, or palpitations.     OUTSIDE HISTORIAN(S):  Select:    EXTERNAL RECORDS REVIEWED  Select:       PAST MEDICAL HISTORY  Past Medical History:   Diagnosis Date    Acquired hypothyroidism 2020    CAD (coronary artery disease)     Chronic diastolic heart failure ([facility]) 2020    Coronary artery disease due to lipid rich plaque     2 Synergy NOEMI to 100% RCA stent placed    Dental disorder     partial dentures- uppers    Diabetes ([facility])     oral medication    ESRD (end stage renal disease) on dialysis ([facility]) 2020    Hemodialysis patient ([facility])     M, W, F    Hyperlipidemia     Hypertension     Kidney transplant candidate     Kidney transplant recipient 10/31/2022    Presence of drug-eluting stent in right coronary artery     QT prolongation 2020    RLS (restless legs syndrome) 2016    Transaminitis 2018     .    SURGICAL HISTORY  Past Surgical History:   Procedure Laterality Date    URETERAL REIMPLANTATION  2023    [place] [facility] [place]    [place]BDOMINAL SURGERY Right 10/31/2022     Donor kidney transplant - [facility] CARDIAC CATH  2016    RCA stented with 2 Synergy drug-eluting stents.    RECOVERY  2016    Procedure: CATH LAB [facility] WITH POSSIBLE   JONATHAN;  Surgeon: Recoveryonly Surgery;  [place] [facility] [place]  Service:     ZZZ CARDIAC CATH  08/16/2016    100% RCA    OTHER Left 2014    left arm upper extremity fistula    OTHER ABDOMINAL SURGERY      left kidney removed due to cancer         FAMILY HISTORY  Family History   Problem Relation Age of Onset    Diabetes Sister     Other Sister         liver disease    Diabetes Brother     Heart Disease Neg Hx           SOCIAL HISTORY  Social History     Socioeconomic History    Marital status:      Spouse name: Not on file    Number of children: Not on file    Years of education: Not on file    Highest education level: Not on file   Occupational History    Not on file   Tobacco Use    Smoking status: Never    Smokeless tobacco: Never   Vaping Use    Vaping status: Never Used   Substance and Sexual Activity    Alcohol use: No     Alcohol/week: 0.0 oz    Drug use: No    Sexual activity: Not Currently   Other Topics Concern    Not on file   Social History Narrative    Not on file     Social Determinants of Health     Financial Resource Strain: Low Risk  (4/9/2024)    Overall Financial Resource Strain (CARDIA)     Difficulty of Paying Living Expenses: Not very hard   Food Insecurity: No Food Insecurity (4/9/2024)    Hunger Vital Sign     Worried About Running Out of Food in the Last Year: Never true     Ran Out of Food in the Last Year: Never true   Transportation Needs: No Transportation Needs (4/9/2024)    PRAPARE - Transportation     Lack of Transportation (Medical): No     Lack of Transportation (Non-Medical): No   Physical Activity: Inactive (4/9/2024)    Exercise Vital Sign     Days of Exercise per Week: 0 days     Minutes of Exercise per Session: 0 min   Stress: No Stress Concern Present (4/9/2024)    [employer] - Occupational Stress Questionnaire     Feeling of Stress : Only a little   Social Connections: Moderately Isolated (4/9/2024)    Social  Connection and Isolation Panel [NHANES]     Frequency of Communication with Friends and Family: More than three times a week     Frequency of Social Gatherings with Friends and Family: More than three times a week     Attends Yazidism Services: Never     Active Member of Clubs or Organizations: No     Attends Club or Organization Meetings: Never     Marital Status:    Intimate Partner Violence: Not At Risk (4/9/2024)    Humiliation, Afraid, Rape, and Kick questionnaire     Fear of Current or Ex-Partner: No     Emotionally Abused: No     Physically Abused: No     Sexually Abused: No   Housing Stability: Low Risk  (4/9/2024)    Housing Stability Vital Sign     Unable to Pay for Housing in the Last Year: No     Number of Places Lived in the Last Year: 1     Unstable Housing in the Last Year: No         CURRENT MEDICATIONS  No current facility-administered medications on file prior to encounter.     Current Outpatient Medications on File Prior to Encounter   Medication Sig Dispense Refill    amLODIPine (NORVASC) 10 MG Tab TOME FERNANDO TABLETA TODOS LOS MCCORMICK 90 Tablet 1    amiodarone (CORDARONE) 200 MG Tab TOME FERNANDO TABLETA TODOS [place] MCCORMICK 90 Tablet 1    Continuous Glucose Sensor (FREESTYLE BALDOMERO 3 SENSOR) Misc 1 Each every 14 days. 6 Each 3    Continuous Glucose  (FREESTYLE BALDOMERO 3 READER) Device 1 Each every day. 1 Each 0    levothyroxine (SYNTHROID) 88 MCG Tab Take 1 Tablet by mouth every morning on an empty stomach. 90 Tablet 3    furosemide (LASIX) 40 MG Tab TOME 1 TABLETA POR VIA ORAL TODOS LOS MCCORMICK 90 Tablet 2    apixaban (ELIQUIS) 2.5mg Tab Take 1 Tablet by mouth 2 times a day. 60 Tablet 5    FARXIGA 5 MG Tab Take 1 Tablet by mouth every day. Por diabetes 90 Tablet 3    metoprolol SR (TOPROL XL) 25 MG TABLET SR 24 HR Take 0.5 Tablets by mouth every day. 45 Tablet 3    glucose blood (ACCU-CHEK GUIDE) strip USE ONE COVERED STRIP TO TEST BLOOD SUGAR THREE TIMES DAILY. 100 Strip 3    Lancets Use one covered  "lancet to test blood sugar three times daily. 100 Each 3    triamcinolone acetonide (KENALOG) 0.1 % Cream Aplique bernardo cantidad del tamano de un guisante sobre la piel bernardo vez al fernanda janna sea necesario para la picazon o el sarpullido. 45 g 2    losartan (COZAAR) 50 MG Tab Take 1 Tablet by mouth every day. 90 Tablet 3    atorvastatin (LIPITOR) 20 MG Tab TAKE 1 TABLET BY MOUTH EVERY DAY IN THE EVENING 90 Tablet 3    docusate sodium (COLACE) 100 MG Cap Take 1 Capsule by mouth 2 times a day as needed for Constipation (Constipation). 180 Capsule 3    sodium bicarbonate (SODIUM BICARBONATE) 650 MG Tab Take 1 Tablet by mouth 2 times a day. 180 Tablet 3    tacrolimus (PROGRAF) 1 MG Cap Take 2 Capsules by mouth 2 times a day. 120 Capsule 0    mycophenolate (CELLCEPT) 250 MG Cap Take 1 Capsule by mouth 2 times a day. 20 Capsule 0    insulin aspart (NOVOLOG FLEXPEN) 100 UNIT/ML injection PEN Inject 0-12 Units under the skin 4 Times a Day,Before Meals and at Bedtime. Unspecified sliding scale.      predniSONE (DELTASONE) 5 MG Tab Take 5 mg by mouth every day.             ALLERGIES  No Known Allergies    PHYSICAL EXAM  VITAL SIGNS:BP (!) 146/65   Pulse 75   Temp 36.3 °C (97.4 °F) (Temporal)   Resp 20   Ht 1.6 m (5' 3\")   Wt 56.9 kg (125 lb 7.1 oz)   LMP  (LMP Unknown)   SpO2 95%   BMI 22.22 kg/m²       GENERAL: Awake and alert  HEAD: Normocephalic and atraumatic  NECK: Normal range of motion, without meningismus  EYES: Pupils Equal, Round, Reactive to Light, extraocular movements intact, conjunctiva white  ENT: Mucous membranes moist, oropharynx clear  PULMONARY: Normal effort, clear to auscultation  CARDIOVASCULAR: No murmurs, clicks or rubs, peripheral pulses 2+  ABDOMINAL: Soft, non-tender, no guarding or rigidity present, no pulsatile masses  BACK: no midline tenderness, no costovertebral tenderness  NEUROLOGICAL: Grossly non-focal neurological examination, speech normal, gait normal  EXTREMITIES: Left upper " extremity AV fistula with good bruit and thrill no edema, normal to inspection  SKIN: Warm and dry.  PSYCHIATRIC: Affect is appropriate    DIAGNOSTIC STUDIES / PROCEDURES  EKG  EKG Interpretation: I independently reviewed the below EKG and did not see signs of a STEMI  Results for orders placed or performed during the hospital encounter of 24   EKG   Result Value Ref Range    Report       [facility] Emergency Dept.    Test Date:  2024  Pt Name:    DIOR NICHOLS    Department: ER  MRN:        [id number]                      Room:       [facility]  Gender:     Female                       Technician: [id number]  :        1950                   Requested By:CARLOS EDUARDO CALHOUN  Order #:    [id number]                    Reading MD: Carlos Eduardo Calhoun    Measurements  Intervals                                Axis  Rate:       69                           P:          257  AZ:         237                          QRS:        55  QRSD:       101                          T:          128  QT:         373  QTc:        400    Interpretive Statements  irst degree av block  Sinus or ectopic atrial rhythm  Prolonged AZ interval  Repol abnrm suggests ischemia, lateral leads unchanged from prior ECG  Compared to ECG 05/15/2024 15:18:26  Atrial premature complex(es) now present  First degree AV block now present  Right-axis deviation no longer present  Possible i schemia still present  Electronically Signed On 2024 02:49:10 PDT by Carlos Eduardo Calhoun         CRITICAL CARE  The very real possibilty of a deterioration of this patient's condition required the highest level of my preparedness for sudden, emergent intervention.  I provided critical care services, which included medication orders, frequent reevaluations of the patient's condition and response to treatment, ordering and reviewing test results, and discussing the case with various consultants.  The critical care time associated with the care  of the patient was 39 minutes. Review chart for interventions. This time is exclusive of any other billable procedures.      LABS  Labs Reviewed   CBC WITH DIFFERENTIAL - Abnormal; Notable for the following components:       Result Value    MCH 26.4 (*)     MCHC 30.4 (*)     Neutrophils-Polys 92.00 (*)     Lymphocytes 1.80 (*)     Neutrophils (Absolute) 8.73 (*)     Lymphs (Absolute) 0.17 (*)     All other components within normal limits   COMP METABOLIC PANEL - Abnormal; Notable for the following components:    Potassium 6.7 (*)     Co2 16 (*)     Glucose 187 (*)     Bun 36 (*)     Creatinine 1.86 (*)     All other components within normal limits   ESTIMATED GFR - Abnormal; Notable for the following components:    GFR (CKD-EPI) 28 (*)     All other components within normal limits   POCT COV-2, FLU A/B, RSV BY PCR   POCT GLUCOSE   POCT GLUCOSE   POCT GLUCOSE   POCT GLUCOSE   POC COV-2, FLU A/B, RSV BY PCR         RADIOLOGY  I have independently interpreted the diagnostic imaging associated with this visit and am waiting the final reading from the radiologist.   My preliminary interpretation is as follows: No cholecystitis    Formal Radiologist interpretation:  US-RUQ   Final Result         1. No evidence for cholelithiasis or acute cholecystitis.   2. Right renal atrophy.      DX-CHEST-LIMITED (1 VIEW)   Final Result         Mild, diffuse interstitial prominence along with atelectasis versus infiltrate at the left lung base.          COURSE & MEDICAL DECISION MAKING    ED COURSE:        INTERVENTIONS BY ME:  Medications   calcium GLUConate 2 g in NaCl IVPB premix (2 g Intravenous New Bag 9/23/24 0222)   ampicillin/sulbactam (Unasyn) 3 g in  mL IVPB (has no administration in time range)   sodium zirconium cyclosilicate (Lokelma) packet 10 g (has no administration in time range)   ondansetron (Zofran) syringe/vial injection 4 mg (4 mg Intravenous Given 9/23/24 0208)   dextrose 50% (D50W) injection 25 g (25 g  Intravenous Given 9/23/24 0208)     And   insulin regular (HumuLIN R/NovoLIN R) 1 unit/mL IV syringe 5 Units (5 Units Intravenous Given 9/23/24 0216)   doxycycline monohydrate (Adoxa) tablet 100 mg (100 mg Oral Given 9/23/24 0234)   furosemide (Lasix) injection 40 mg (40 mg Intravenous Given 9/23/24 0240)       Response on recheck:      INITIAL ASSESSMENT, COURSE AND PLAN  Care Narrative:    Ms. Gallegos presented with vomiting, hyperkalemia, and a left lower lobe pneumonia likely contributing to her overall weakness and decompensation. The hyperkalemia is likely multifactorial, with a critically elevated potassium level of 6.7. Given her history of ESRD, a missed dialysis session, and significant hyperkalemia, immediate interventions were initiated to reduce serum potassium. Calcium gluconate, insulin, dextrose, and sodium zirconium cyclosilicate were administered to stabilize her cardiac membrane and facilitate potassium removal. She was also given IV furosemide to promote diuresis.    Further evaluation revealed findings consistent with left lower lobe pneumonia on chest X-ray, which may explain her flu-like symptoms. Antibiotic therapy with ampicillin/sulbactam was initiated to treat the pneumonia, with doxycycline added for broader coverage. . The patient’s initial EKG showed first-degree AV block and nonspecific repolarization abnormalities, consistent with her electrolyte disturbances, but no signs of acute ischemia.  Feel patient is stable for admission to medical telemetry floor    Given the patient’s multiple comorbidities, including chronic kidney disease, hyperkalemia, and pneumonia, she was admitted for further monitoring and continued dialysis, along with management of her infection. A nephrology consultation was obtained for dialysis planning. Close monitoring of her electrolytes and renal function will be critical, as well as follow-up imaging to assess pneumonia resolution. Return precautions were  discussed, including worsening shortness of breath or chest pain.           ADDITIONAL PROBLEM LIST    DISPOSITION AND DISCUSSIONS  Discussion of management with other [facility] or appropriate source(s): None     I have discussed management of the patient with the following physicians and BETTE's: I disccused the mangament and decision to admit this patient with the Hospitalist. Dr. Palma    Spoke to nephrologist on-call who recommended Lissettma will consult in the morning    FINAL DIAGNOSIS  1. Nausea and vomiting, unspecified vomiting type    2. Pneumonia of left lower lobe due to infectious organism    3. Hyperkalemia    4. Chronic kidney disease, unspecified CKD stage    5. Weakness             Electronically signed by: Carlos Eduardo Sloan DO ,2:49 AM 09/23/24

## 2024-09-23 NOTE — CONSULTS
Nephrology Consultation    Date of Service  2024    Referring Physician  Leonardo Sutton M.D.    Consulting Physician  Priyank Villar M.D.    Reason for Consultation  Management of ESRD s/p kidney transplant    History of Presenting Illness  73 y.o. female with a history of ESRD status post  donor kidney transplant 10/31/2022 complicated by BK viremia, transplant ureteral stenosis requiring reimplantation, history of pancytopenia who presented 2024 with nausea and vomiting.     services were used in the patient's primary language of O[place]i.   Name or Number: Nadja  Mode of interpretation: Telephone    Patient says yesterday she could not keep anything down, vomited several times, so she came into the hospital.  Today, she is feeling better, able to eat breakfast today.  She does not know what medications she takes, she says her son manages all her medication.  She says she does take tacrolimus 1 mg in the morning and 2 mg in the evening, but does not know her dose of mycophenolate or prednisone.  She does not know if she takes furosemide.  She complains of weakness more in her left side than the right side, but otherwise, she feels fine.    Review of Systems  Review of Systems   Constitutional:  Negative for fever.   Respiratory:  Negative for shortness of breath.    Cardiovascular:  Negative for chest pain.   Gastrointestinal:  Positive for nausea and vomiting. Negative for abdominal pain.   Neurological:  Positive for focal weakness.   All other systems reviewed and are negative.      Past Medical History  Past Medical History:   Diagnosis Date    Acquired hypothyroidism 2020    CAD (coronary artery disease)     Chronic diastolic heart failure (HCC) 2020    Coronary artery disease due to lipid rich plaque     2 Synergy NOEMI to 100% RCA stent placed    Dental disorder     partial dentures- uppers    Diabetes (HCC)     oral medication    ESRD (end stage renal disease)  on dialysis ([facility]) 2020    Hemodialysis patient (Formerly [facility])     M, W, F    Hyperlipidemia     Hypertension     Kidney transplant candidate     Kidney transplant recipient 10/31/2022    Presence of drug-eluting stent in right coronary artery     QT prolongation 2020    RLS (restless legs syndrome) 2016    Transaminitis 2018       Surgical History  Past Surgical History:   Procedure Laterality Date    URETERAL REIMPLANTATION  2023    transplant ureter reimplantation - [facility]    OTHER ABDOMINAL SURGERY Right 10/31/2022     Donor kidney transplant - [facility]    ZZZ CARDIAC CATH  2016    RCA stented with 2 Synergy drug-eluting stents.    RECOVERY  2016    Procedure: CATH LAB [facility] WITH POSSIBLE DR. CASTILLO;  Surgeon: Recoveryonly Surgery;  Location: SURGERY PRE-POST PROC UNIT [facility];  Service:     ZZZ CARDIAC CATH  2016    100% RCA    OTHER Left 2014    left arm upper extremity fistula    OTHER ABDOMINAL SURGERY      left kidney removed due to cancer       Family History  Family History   Problem Relation Age of Onset    Diabetes Sister     Other Sister         liver disease    Diabetes Brother     Heart Disease Neg Hx        Social History  Social History     Socioeconomic History    Marital status:      Spouse name: Not on file    Number of children: Not on file    Years of education: Not on file    Highest education level: Not on file   Occupational History    Not on file   Tobacco Use    Smoking status: Never    Smokeless tobacco: Never   Vaping Use    Vaping status: Never Used   Substance and Sexual Activity    Alcohol use: No     Alcohol/week: 0.0 oz    Drug use: No    Sexual activity: Not Currently   Other Topics Concern    Not on file   Social History Narrative    Not on file     Social Determinants of Health     Financial Resource Strain: Low Risk  (2024)    Overall Financial Resource Strain (CARDIA)     Difficulty of Paying Living  Expenses: Not very hard   Food Insecurity: No Food Insecurity (4/9/2024)    Hunger Vital Sign     Worried About Running Out of Food in the Last Year: Never true     Ran Out of Food in the Last Year: Never true   Transportation Needs: No Transportation Needs (4/9/2024)    PRAPARE - Transportation     Lack of Transportation (Medical): No     Lack of Transportation (Non-Medical): No   Physical Activity: Inactive (4/9/2024)    Exercise Vital Sign     Days of Exercise per Week: 0 days     Minutes of Exercise per Session: 0 min   Stress: No Stress Concern Present (4/9/2024)    [place] [employer] - Occupational Stress Questionnaire     Feeling of Stress : Only a little   Social Connections: Moderately Isolated (4/9/2024)    Social Connection and Isolation Panel [NHANES]     Frequency of Communication with Friends and Family: More than three times a week     Frequency of Social Gatherings with Friends and Family: More than three times a week     Attends Tenriism Services: Never     Active Member of Clubs or Organizations: No     Attends Club or Organization Meetings: Never     Marital Status:    Intimate Partner Violence: Not At Risk (9/23/2024)    Humiliation, Afraid, Rape, and Kick questionnaire     Fear of Current or Ex-Partner: No     Emotionally Abused: No     Physically Abused: No     Sexually Abused: No   Housing Stability: Low Risk  (4/9/2024)    Housing Stability Vital Sign     Unable to Pay for Housing in the Last Year: No     Number of Places Lived in the Last Year: 1     Unstable Housing in the Last Year: No       Medications  Current Facility-Administered Medications   Medication Dose Route Frequency Provider Last Rate Last Admin    acetaminophen (Tylenol) tablet 650 mg  650 mg Oral Q6HRS PRN Melony Palma M.D.        labetalol (Normodyne/Trandate) injection 10 mg  10 mg Intravenous Q4HRS PRN Melony Palma M.D.        insulin GLARGINE (Lantus,Semglee) injection  10 Units  Subcutaneous Q EVENING Melony Palma M.D.        And    insulin lispro (HumaLOG,AdmeLOG) injection  2-9 Units Subcutaneous Q6HRS Mleony Palma M.D.   3 Units at 09/23/24 1143    And    dextrose 50% (D50W) injection 25 g  25 g Intravenous Q15 MIN PRN Melony Palma M.D.        amiodarone (Cordarone) tablet 200 mg  200 mg Oral DAILY Melony Palma M.D.   200 mg at 09/23/24 0542    amLODIPine (Norvasc) tablet 10 mg  10 mg Oral DAILY Meolny Palma M.D.   10 mg at 09/23/24 0541    apixaban (Eliquis) tablet 2.5 mg  2.5 mg Oral BID Melony Palma M.D.   2.5 mg at 09/23/24 0541    atorvastatin (Lipitor) tablet 20 mg  20 mg Oral Q EVENING Melony Palma M.D.        dapagliflozin propanediol (Farxiga) tablet 5 mg  5 mg Oral DAILY Melony Palma M.D.   5 mg at 09/23/24 0540    levothyroxine (Synthroid) tablet 88 mcg  88 mcg Oral AM ES Melony Palma M.D.   88 mcg at 09/23/24 0541    metoprolol SR (Toprol XL) tablet 12.5 mg  12.5 mg Oral DAILY Melony Palma M.D.   12.5 mg at 09/23/24 0541    mycophenolate (Cellcept) capsule 250 mg  250 mg Oral BID Melony Palma M.D.   250 mg at 09/23/24 0806    sodium bicarbonate tablet 650 mg  650 mg Oral BID Melony Palma M.D.   650 mg at 09/23/24 0541    tacrolimus (Prograf) capsule 2 mg  2 mg Oral BID Melony Palma M.D.   2 mg at 09/23/24 0540    predniSONE (Deltasone) tablet 5 mg  5 mg Oral DAILY SERGEI GallowayD.   5 mg at 09/23/24 0542    aspirin EC tablet 81 mg  81 mg Oral DAILY SERGEI GallowayDOctaviano   81 mg at 09/23/24 0541    sodium bicarbonate 75 mEq in 1/2 NS 1,000 mL infusion  75 mEq Intravenous Continuous Priyank Safdi, M.D. 100 mL/hr at 09/23/24 0842 75 mEq at 09/23/24 0842    furosemide (Lasix) tablet 40 mg  40 mg Oral Q DAY Priyank Villar M.D.   40 mg at 09/23/24 1023    dextrose 50% (D50W) injection 25 g  25 g Intravenous Once Leonardo Sutton M.D.        And    insulin regular (HumuLIN R/NovoLIN R) 1 unit/mL syringe (IV ONLY) 5 Units  5 Units Intravenous Once Leonardo  CHAPO Sutton           Allergies  No Known Allergies    Physical Exam  Temp:  [36.3 °C (97.3 °F)-36.7 °C (98.1 °F)] 36.7 °C (98.1 °F)  Pulse:  [73-91] 75  Resp:  [18-20] 18  BP: (114-151)/(51-67) 114/51  SpO2:  [92 %-97 %] 93 %    Physical Exam  Constitutional:       General: She is not in acute distress.     Appearance: She is well-developed.   HENT:      Head: Normocephalic and atraumatic.      Mouth/Throat:      Mouth: Mucous membranes are moist.      Pharynx: Oropharynx is clear. No oropharyngeal exudate.   Eyes:      General: No scleral icterus.     Extraocular Movements: Extraocular movements intact.   Neck:      Trachea: No tracheal deviation.   Cardiovascular:      Rate and Rhythm: Normal rate and regular rhythm.      Heart sounds: Normal heart sounds. No murmur heard.  Pulmonary:      Effort: Pulmonary effort is normal.      Breath sounds: No stridor. No rales.   Abdominal:      Palpations: Abdomen is soft.      Tenderness: There is no abdominal tenderness.   Musculoskeletal:         General: Normal range of motion.      Cervical back: Normal range of motion. No rigidity.      Right lower leg: Edema (1+) present.      Left lower leg: Edema (1+) present.   Skin:     General: Skin is warm and dry.   Neurological:      General: No focal deficit present.      Mental Status: She is alert and oriented to person, place, and time.   Psychiatric:         Mood and Affect: Mood normal.         Behavior: Behavior normal.         Fluids      Laboratory  Labs reviewed, pertinent labs below.  Recent Labs     09/23/24 0054   WBC 9.5   RBC 4.81   HEMOGLOBIN 12.7   HEMATOCRIT 41.8   MCV 86.9   MCH 26.4*   MCHC 30.4*   RDW 46.0   PLATELETCT 236   MPV 11.0     Recent Labs     09/23/24  0054 09/23/24  0710   SODIUM 136 136   POTASSIUM 6.7* 5.9*   CHLORIDE 107 106   CO2 16* 19*   GLUCOSE 187* 137*   BUN 36* 36*   CREATININE 1.86* 1.73*   CALCIUM 10.4 9.7                URINALYSIS:  Lab Results   Component Value Date/Time     COLORURINE Yellow 2024 0655    CLARITY Hazy (A) 2024 0655    SPECGRAVITY 1.015 2024 06    PHURINE 6.5 2024 0655    KETONES Negative 2024 06    PROTEINURIN Negative 2024 0655    BILIRUBINUR Negative 2024 06    UROBILU 0.2 2023 2235    NITRITE Positive (A) 2024 0655    LEUKESTERAS Negative 2024 06    OCCULTBLOOD Negative 2024 06     [facility]  Lab Results   Component Value Date/Time    TOTPROTUR 16.0 (H) 2023      Lab Results   Component Value Date/Time    CREATININEU 81.98 2023       Imaging interpreted by radiologist. Imaging reports reviewed with pertinent findings below  US-RUQ   Final Result         1. No evidence for cholelithiasis or acute cholecystitis.   2. Right renal atrophy.      DX-CHEST-LIMITED (1 VIEW)   Final Result         Mild, diffuse interstitial prominence along with atelectasis versus infiltrate at the left lung base.            Assessment/Plan  73 y.o. female with a history of ESRD status post  donor kidney transplant 10/31/2022 complicated by BK viremia, transplant ureteral stenosis requiring reimplantation, history of pancytopenia who presented 2024 with nausea and vomiting.    1.  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  Patient had mild SASHA on admission, slightly improving.  Nonoliguric.  Avoid NSAIDs and other nephrotoxins.  Recommend IV fluids as below.    2.  Immunosuppression.  Patient is on tacrolimus 1 mg in the morning and 2 mg in the evening, mycophenolate 250 mg p.o. twice daily, prednisone 5 mg daily.  Check 5 AM tacrolimus level tomorrow.    3.  Nausea and vomiting, reason for admission, now improved.  Unclear etiology.  Advance diet as tolerated    4.  Hyperkalemia, noted on admission.  Unclear etiology.  Check tacrolimus level.  Resume home Lasix 40 mg daily.  Discontinue Lokelma.  No need for dialysis.    5.  Metabolic acidosis, persistent.  Change normal  saline IV fluids to sodium bicarbonate 75 mEq in half-normal saline at a rate of 100 mL/h.  Check renal function panel daily.    6.  Pancytopenia, noted historically, currently not present on this admission.  Check CBC daily.      Priyank Villar MD  Nephrology  [facility]

## 2024-09-23 NOTE — ASSESSMENT & PLAN NOTE
Flulike symptoms for the past few days.  COVID, RSV, flu negative in the ED  Procalcitonin is negative,  Viral PCR negative    Patient clinically improved

## 2024-09-23 NOTE — CARE PLAN
The patient is Stable - Low risk of patient condition declining or worsening    Shift Goals  Clinical Goals: monitor K+, wean O2  Patient Goals: comfort  Family Goals: megan    Progress made toward(s) clinical / shift goals:  Potassium has decreased to 5.9 from 6.7. Patient weaned off of oxygen, SPO2 remaining above 90% on room air.       Problem: Pain - Standard  Goal: Alleviation of pain or a reduction in pain to the patient’s comfort goal  Outcome: Progressing     Problem: Knowledge Deficit - Standard  Goal: Patient and family/care givers will demonstrate understanding of plan of care, disease process/condition, diagnostic tests and medications  Outcome: Progressing     Problem: Hemodynamics  Goal: Patient's hemodynamics, fluid balance and neurologic status will be stable or improve  Outcome: Progressing     Problem: Respiratory  Goal: Patient will achieve/maintain optimum respiratory ventilation and gas exchange  Outcome: Progressing     Problem: Fall Risk  Goal: Patient will remain free from falls  Outcome: Progressing

## 2024-09-23 NOTE — ED NOTES
Med Rec complete per pt and pharmacy   Allergies reviewed  Antibiotics in the past 30 days:no  Anticoagulant in past 14 days:yes  Anticoagulant:Eliquis 2.5 mg Last dose:09/22/24  Pharmacy patient utilizes:[facility] on [address] on [address]    Pt states she is on 2 insulin but unable to verify names. Pt states she injects 10 units BID of insulin and another insulin pt states she inject 15 units with every meal. Pt states she injected both insulins yesterday.     Per [facility] per pharmacy staff on their records Novolog Flexpen 0-12 units TID before meals or per sliding scale was last sold to pt on 04/29/2023 and Lantus Solostar 8 units QD was last dispensed on 11/2022  (Did not include insulin to med recs)    Per [facility] no record of any insulin

## 2024-09-23 NOTE — ASSESSMENT & PLAN NOTE
Severe hyperkalemia presentation 6.7.  Likely due to SASHA  Resolved  DC bicarb drip  Monitor electrolytes

## 2024-09-23 NOTE — PROGRESS NOTES
Patient seen and examined, admitted after midnight for hyperkalemia. She has ESRD. Nephrology consulted on admission, started on bicarb drip. Also giving Insulin and dextrose for her hyperkalemia  Close monitor on tele for decompensation

## 2024-09-23 NOTE — ED TRIAGE NOTES
Pt ambulated into triage with c/o flu like symptoms, chills, generalized body aches, cough, vomiting, and runny nose. Covid swab ordered and done in triage. Pt is A&O and ambulatory

## 2024-09-23 NOTE — CARE PLAN
Problem: Pain - Standard  Goal: Alleviation of pain or a reduction in pain to the patient’s comfort goal  Description: Target End Date:  Prior to discharge or change in level of care    Document on Vitals flowsheet    1.  Document pain using the appropriate pain scale per order or unit policy  2.  Educate and implement non-pharmacologic comfort measures (i.e. relaxation, distraction, massage, cold/heat therapy, etc.)  3.  Pain management medications as ordered  4.  Reassess pain after pain med administration per policy  5.  If opiods administered assess patient's response to pain medication is appropriate per POSS sedation scale  6.  Follow pain management plan developed in collaboration with patient and interdisciplinary team (including palliative care or pain specialists if applicable)  Outcome: Progressing     Problem: Knowledge Deficit - Standard  Goal: Patient and family/care givers will demonstrate understanding of plan of care, disease process/condition, diagnostic tests and medications  Description: Target End Date:  1-3 days or as soon as patient condition allows    Document in Patient Education    1.  Patient and family/caregiver oriented to unit, equipment, visitation policy and means for communicating concern  2.  Complete/review Learning Assessment  3.  Assess knowledge level of disease process/condition, treatment plan, diagnostic tests and medications  4.  Explain disease process/condition, treatment plan, diagnostic tests and medications  Outcome: Progressing     Problem: Hemodynamics  Goal: Patient's hemodynamics, fluid balance and neurologic status will be stable or improve  Description: Target End Date:  Prior to discharge or change in level of care    Document on Assessment and I/O flowsheet templates    1.  Monitor vital signs, pulse oximetry and cardiac monitor per provider order and/or policy  2.  Maintain blood pressure per provider order  3.  Hemodynamic monitoring per provider order  4.   Manage IV fluids and IV infusions  5.  Monitor intake and output  6.  Daily weights per unit policy or provider order  7.  Assess peripheral pulses and capillary refill  8.  Assess color and body temperature  9.  Position patient for maximum circulation/cardiac output  10. Monitor for signs/symptoms of excessive bleeding  11. Assess mental status, restlessness and changes in level of consciousness  12. Monitor temperature and report fever or hypothermia to provider immediately. Consideration of targeted temperature management.  Outcome: Progressing     Problem: Respiratory  Goal: Patient will achieve/maintain optimum respiratory ventilation and gas exchange  Description: Target End Date:  Prior to discharge or change in level of care    Document on Assessment flowsheet    1.  Assess and monitor rate, rhythm, depth and effort of respiration  2.  Breath sounds assessed qshift and/or as needed  3.  Assess O2 saturation, administer/titrate oxygen as ordered  4.  Position patient for maximum ventilatory efficiency  5.  Turn, cough, and deep breath with splinting to improve effectiveness  6.  Collaborate with RT to administer medication/treatments per order  7.  Encourage use of incentive spirometer and encourage patient to cough after use and utilize splinting techniques if applicable  8.  Airway suctioning  9.  Monitor sputum production for changes in color, consistency and frequency  10. Perform frequent oral hygiene  11. Alternate physical activity with rest periods  Outcome: Progressing   The patient is Stable - Low risk of patient condition declining or worsening    Shift Goals  Clinical Goals: VSS, safety  Patient Goals: to get better  Family Goals: TEE    Progress made toward(s) clinical / shift goals:  VSS, safety    Patient is not progressing towards the following goals:

## 2024-09-23 NOTE — PROGRESS NOTES
Report received from night shift RN, assumed patient care. Patient is calmly resting in bed, no signs of distress, even and unlabored breathing noted. Pt on 2 LPM NC O2. A&Ox4. Tele box on and in place. Patient has call light within reach, fall precautions in place. Patient states no needs at this time. Hourly rounding initiated.

## 2024-09-23 NOTE — DISCHARGE PLANNING
HTH/SCP TCN chart review completed. Collaborated with NAHOMI Ngo prior to meeting with the pt. The most current review of medical record, knowledge of pt's PLOF and social support, LACE+ score of 72, 6 clicks scores of 24 ADL's and 24 mobility were considered.      Pt seen at bedside. Introduced TCN program. Provided education regarding post acute levels of care. Education provided regarding case management policy for blanket SNF referrals. Discussed HTH/SCP plan benefits. Pt verbalizes understanding.     Patient is Citizen of [place] speaking and was seen with  #[id number].  She states she lives with her spouse and son in a one level home and was independent/modified independent with ADL's, IADL's, and mobility at her baseline.  She is currently on RA and states she uses a walking stick occasionally.  She is aware of medical UBER's.  She states her son will provide transportation home at discharge and has no functional concerns with discharge to home when medically cleared.  She states she is at her baseline level of function.      In collaboration with CM, current discharge considerations are noted to be for home with close outpatient f/u when medically cleared.      TCN will continue to follow and collaborate with discharge planning team as additional post acute needs arise. Thank you.     Completed today:  Choice obtained: None.  See above.    Pt aware of [facility] blanket referral policy  [facility]. Discussed possible outpatient transitional PCP follow up if indicated and pt in agreement; sent information to assist in scheduling.

## 2024-09-23 NOTE — DISCHARGE PLANNING
Care Transition Team Assessment     Patient, Tere Chandler, is 73-year-old admitted for flu like symptoms. Information was given by patient. Please see pt's H&P for prior medical history. Patient reports living with her son, Aureliano, in a one story home; reports address on facesheet is inaccurate and does not know current address. Pt confirmed contacts on facesheet. Patient does have PCP, Kathy Melgar. Patient reports no concerns with food insecurity. Patient has unreliable transport as son works most of the time. Confirmed medical insurance is SCP and Medicaid. Patient is independent. Patient utilizes makeshift walking stick when needed. Patient has no history of use with SA. Pt has support from other adult children, does not live with them. SW referred CHW to pt.    Identified DC needs at this time:    Transportation resources.    Information Source  Information Given By: Patient  Who is responsible for making decisions for patient? : Patient    Readmission Evaluation  Is this a readmission?: No    Elopement Risk  Legal Hold: No  Ambulatory or Self Mobile in Wheelchair: Yes  Disoriented: No  Psychiatric Symptoms: None  History of Wandering: No  Elopement this Admit: No  Vocalizing Wanting to Leave: No  Displays Behaviors, Body Language Wanting to Leave: No-Not at Risk for Elopement  Elopement Risk: Not at Risk for Elopement    Interdisciplinary Discharge Planning  Primary Care Physician: Kathy Melgar  Lives with - Patient's Self Care Capacity: Adult Children  Support Systems: Family Member(s)  Housing / Facility: 1 Story House    Discharge Preparedness  What is your plan after discharge?: Home with help  What are your discharge supports?: Child  Prior Functional Level: Ambulatory, Independent with Activities of Daily Living, Independent with Medication Management, Other (Comments) (Uses make shift walking stick)  Difficulity with IADLs: Driving    Functional Assesment  Prior Functional Level:  Ambulatory, Independent with Activities of Daily Living, Independent with Medication Management, Other (Comments) (Uses make shift walking stick)    Vision / Hearing Impairment  Vision Impairment : No  Hearing Impairment : No    Domestic Abuse  Have you ever been the victim of abuse or violence?: No  Possible Abuse/Neglect Reported to:: Not Applicable    Anticipated Discharge Information  Discharge Disposition: Discharged to home/self care (01)

## 2024-09-23 NOTE — ASSESSMENT & PLAN NOTE
Creatinine on presentation 1.86, baseline about 1.4-1.5.  History of ESRD and was on dialysis in the past, s/p cadaveric transplant in 2022    Renal function improved  Nephrology following discussed with Dr. Villar will DC bicarb drip and monitor  Avoid nephrotoxic agents

## 2024-09-23 NOTE — H&P
Hospital Medicine History & Physical Note    Date of Service  2024    Primary Care Physician  ANGELITA Concepcion    Consultants  nephrology type    Code Status  Full Code    Chief Complaint  Chief Complaint   Patient presents with    Flu Like Symptoms    Vomiting       History of Presenting Illness  Tere Gallegos is a 73 y.o. female who presented 2024 with flulike symptoms.  PMH of ESRD s/p  donor kidney transplant 10/2022 on immunosuppressive's, hypothyroidism, CAD s/p RCA stenting , hypertension, dyslipidemia, insulin-dependent diabetes, paroxysmal A-fib on anticoagulation treatment.  Symptoms began about 4 to 5 days ago and include chills, malaise, fatigue, generalized weakness and bodyaches, slight cough and a sore throat.  Yesterday the symptoms began improving but then she been having nausea/vomiting, denies any diarrhea, no urinary symptoms.    In the ED afebrile, hemodynamically stable.  Labs show potassium of 6.7, creatinine 1.86.    I discussed the plan of care with patient, bedside RN, and EDP .    Review of Systems  Review of Systems   Constitutional:  Positive for chills and malaise/fatigue. Negative for fever.   HENT:  Positive for sore throat.    Respiratory:  Positive for cough.    Cardiovascular:  Negative for chest pain.   Gastrointestinal:  Positive for nausea and vomiting. Negative for abdominal pain and diarrhea.   Genitourinary:  Negative for dysuria and urgency.       Past Medical History   has a past medical history of Acquired hypothyroidism (2020), CAD (coronary artery disease), Chronic diastolic heart failure (HCC) (2020), Coronary artery disease due to lipid rich plaque, Dental disorder, Diabetes ([facility]), ESRD (end stage renal disease) on dialysis ([facility]) (2020), Hemodialysis patient ([facility]), Hyperlipidemia, Hypertension, Kidney transplant candidate, Kidney transplant recipient (10/31/2022), Presence of drug-eluting stent in right  coronary artery, QT prolongation (2020), RLS (restless legs syndrome) (2016), and Transaminitis (2018).    Surgical History   has a past surgical history that includes recovery (2016); other abdominal surgery; other (Left, ); zzz cardiac cath (2016); zzz cardiac cath (2016); other abdominal surgery (Right, 10/31/2022); and ureteral reimplantation (2023).     Family History  family history includes Diabetes in her brother and sister; Other in her sister.   Family history reviewed with patient. There is no family history that is pertinent to the chief complaint.     Social History   reports that she has never smoked. She has never used smokeless tobacco. She reports that she does not drink alcohol and does not use drugs.    Allergies  No Known Allergies    Medications  Prior to Admission Medications   Prescriptions Last Dose Informant Patient Reported? Taking?   Continuous Glucose  (FREESTYLE BALDOMERO 3 READER) Device   No No   Si Each every day.   Continuous Glucose Sensor (FREESTYLE BALDOMERO 3 SENSOR) Misc   No No   Si Each every 14 days.   FARXIGA 5 MG Tab   No No   Sig: Take 1 Tablet by mouth every day. Por diabetes   Lancets   No No   Sig: Use one covered lancet to test blood sugar three times daily.   amLODIPine (NORVASC) 10 MG Tab   No No   Sig: TOME FERNANDO TABLETA TODOS LOS MCCORMICK   amiodarone (CORDARONE) 200 MG Tab   No No   Sig: TOME FERNANDO TABLETA TODOS LOS MCCORMICK   apixaban (ELIQUIS) 2.5mg Tab   No No   Sig: Take 1 Tablet by mouth 2 times a day.   atorvastatin (LIPITOR) 20 MG Tab   No No   Sig: TAKE 1 TABLET BY MOUTH EVERY DAY IN THE EVENING   docusate sodium (COLACE) 100 MG Cap   No No   Sig: Take 1 Capsule by mouth 2 times a day as needed for Constipation (Constipation).   furosemide (LASIX) 40 MG Tab   No No   Sig: TOME 1 TABLETA POR VIA ORAL TODOS LOS MCCORMICK   glucose blood (ACCU-CHEK GUIDE) strip   No No   Sig: USE ONE COVERED STRIP TO TEST BLOOD SUGAR THREE  TIMES DAILY.   insulin aspart (NOVOLOG FLEXPEN) 100 UNIT/ML injection PEN  Patient, Rx Bottle (For Med Information) Yes No   Sig: Inject 0-12 Units under the skin 4 Times a Day,Before Meals and at Bedtime. Unspecified sliding scale.   levothyroxine (SYNTHROID) 88 MCG Tab   No No   Sig: Take 1 Tablet by mouth every morning on an empty stomach.   losartan (COZAAR) 50 MG Tab   No No   Sig: Take 1 Tablet by mouth every day.   metoprolol SR (TOPROL XL) 25 MG TABLET SR 24 HR   No No   Sig: Take 0.5 Tablets by mouth every day.   mycophenolate (CELLCEPT) 250 MG Cap  Patient, Rx Bottle (For Med Information) No No   Sig: Take 1 Capsule by mouth 2 times a day.   predniSONE (DELTASONE) 5 MG Tab  Patient, Rx Bottle (For Med Information) Yes No   Sig: Take 5 mg by mouth every day.   sodium bicarbonate (SODIUM BICARBONATE) 650 MG Tab   No No   Sig: Take 1 Tablet by mouth 2 times a day.   tacrolimus (PROGRAF) 1 MG Cap   No No   Sig: Take 2 Capsules by mouth 2 times a day.   triamcinolone acetonide (KENALOG) 0.1 % Cream   No No   Sig: Aplique bernardo cantidad del tamano de un guisante sobre la piel bernardo vez al fernanda janna sea necesario para la picazon o el sarpullido.      Facility-Administered Medications: None       Physical Exam  Temp:  [36.3 °C (97.4 °F)] 36.3 °C (97.4 °F)  Pulse:  [73-91] 91  Resp:  [20] 20  BP: (125-151)/(52-67) 127/60  SpO2:  [95 %-97 %] 97 %  Blood Pressure : (!) 146/65   Temperature: 36.3 °C (97.4 °F)   Pulse: 75   Respiration: 20   Pulse Oximetry: 95 %       Physical Exam  Constitutional:       Appearance: She is ill-appearing.   HENT:      Head: Normocephalic and atraumatic.      Mouth/Throat:      Mouth: Mucous membranes are moist.      Pharynx: No oropharyngeal exudate or posterior oropharyngeal erythema.   Cardiovascular:      Rate and Rhythm: Normal rate and regular rhythm.      Pulses: Normal pulses.      Heart sounds: Normal heart sounds. No murmur heard.  Pulmonary:      Effort: Pulmonary effort is  "normal. No respiratory distress.      Breath sounds: Normal breath sounds.   Musculoskeletal:         General: No swelling or tenderness. Normal range of motion.      Comments: Left arm AV fistula   Skin:     General: Skin is warm and dry.   Neurological:      General: No focal deficit present.      Mental Status: She is alert and oriented to person, place, and time.   Psychiatric:         Mood and Affect: Mood normal.         Laboratory:  Recent Labs     09/23/24  0054   WBC 9.5   RBC 4.81   HEMOGLOBIN 12.7   HEMATOCRIT 41.8   MCV 86.9   MCH 26.4*   MCHC 30.4*   RDW 46.0   PLATELETCT 236   MPV 11.0     Recent Labs     09/23/24  0054   SODIUM 136   POTASSIUM 6.7*   CHLORIDE 107   CO2 16*   GLUCOSE 187*   BUN 36*   CREATININE 1.86*   CALCIUM 10.4     Recent Labs     09/23/24  0054   ALTSGPT 17   ASTSGOT 17   ALKPHOSPHAT 89   TBILIRUBIN 0.4   GLUCOSE 187*         No results for input(s): \"NTPROBNP\" in the last 72 hours.      No results for input(s): \"TROPONINT\" in the last 72 hours.    Imaging:  US-RUQ   Final Result         1. No evidence for cholelithiasis or acute cholecystitis.   2. Right renal atrophy.      DX-CHEST-LIMITED (1 VIEW)   Final Result         Mild, diffuse interstitial prominence along with atelectasis versus infiltrate at the left lung base.          X-Ray:  I have personally reviewed the images and compared with prior images. and My impression is: Opacity on the left  EKG:  I have personally reviewed the images and compared with prior images. and My impression is: Sinus arrhythmia, IVCD, T wave inversions in the lateral leads similar to prior    Assessment/Plan:  Justification for Admission Status  I anticipate this patient will require at least two midnights for appropriate medical management, necessitating inpatient admission because hyperkalemia    * Hyperkalemia- (present on admission)  Assessment & Plan  Severe hyperkalemia presentation 6.7.  Likely due to SASHA  She was given calcium, " insulin/glucose, Lokelma in the ED  EDP discussed with nephrology who will evaluate the patient  Continue close monitoring telemetry.  BMP ordered every 6 hours continue monitoring    Acute kidney injury superimposed on chronic kidney disease (HCC)- (present on admission)  Assessment & Plan  Creatinine on presentation 1.86, baseline about 1.4-1.5.  History of ESRD and was on dialysis in the past, s/p cadaveric transplant in   She follows with nephrology, EDP discussed with nephrologist will evaluate the patient  SASHA likely due to dehydration due to recent flulike symptoms  IV fluids, BMP ordered continue monitoring, avoid nephrotoxic agents    Viral illness- (present on admission)  Assessment & Plan  Flulike symptoms for the past few days.  COVID, RSV, flu negative in the ED  Procalcitonin is negative, unlikely bacterial infection.  Will not continue antibiotics  Extended viral panel ordered    -donor kidney transplant recipient- (present on admission)  Assessment & Plan  Continue prednisone, mycophenolate, tacrolimus    Paroxysmal atrial fibrillation (HCC)- (present on admission)  Assessment & Plan  Continue metoprolol, apixaban    Coronary artery disease with angina pectoris with documented spasm (HCC)- (present on admission)  Assessment & Plan  Continue aspirin, apixaban    Type 2 diabetes mellitus with stage 5 chronic kidney disease not on chronic dialysis, with long-term current use of insulin (HCC)- (present on admission)  Assessment & Plan  Continue glargine, sliding scale while in the hospital    Hypothyroidism, acquired- (present on admission)  Assessment & Plan  Continue levothyroxine    Mixed hyperlipidemia- (present on admission)  Assessment & Plan  Continue statin    Primary hypertension- (present on admission)  Assessment & Plan  Continue amlodipine, metoprolol.  Hold furosemide due to SASHA    Patient is critically ill.   The patient continues to have: Significant hyperkalemia, SASHA on  CKD  If untreated there is a high chance of deterioration into failure arrhythmia and eventually death.   The critical care that I am providing today is: Frequent lab monitoring this, IV meds for correction of her hypokalemia.  Close monitoring on telemetry has 1 transplanted kidney and is currently in SASHA, needs close monitoring of her BMP, IV fluids  The critical that has been undertaken is medically complex.   There has been no overlap in critical care time.   Critical Care Time not including procedures: 51 minutes      VTE prophylaxis: therapeutic anticoagulation with apixaban

## 2024-09-24 ENCOUNTER — PATIENT OUTREACH (OUTPATIENT)
Dept: HEALTH INFORMATION MANAGEMENT | Facility: OTHER | Age: 74
End: 2024-09-24
Payer: MEDICARE

## 2024-09-24 LAB
ANION GAP SERPL CALC-SCNC: 14 MMOL/L (ref 7–16)
ANION GAP SERPL CALC-SCNC: 15 MMOL/L (ref 7–16)
BUN SERPL-MCNC: 32 MG/DL (ref 8–22)
BUN SERPL-MCNC: 33 MG/DL (ref 8–22)
CALCIUM SERPL-MCNC: 9.3 MG/DL (ref 8.5–10.5)
CALCIUM SERPL-MCNC: 9.6 MG/DL (ref 8.5–10.5)
CHLORIDE SERPL-SCNC: 104 MMOL/L (ref 96–112)
CHLORIDE SERPL-SCNC: 97 MMOL/L (ref 96–112)
CO2 SERPL-SCNC: 20 MMOL/L (ref 20–33)
CO2 SERPL-SCNC: 22 MMOL/L (ref 20–33)
CREAT SERPL-MCNC: 1.49 MG/DL (ref 0.5–1.4)
CREAT SERPL-MCNC: 1.59 MG/DL (ref 0.5–1.4)
ERYTHROCYTE [DISTWIDTH] IN BLOOD BY AUTOMATED COUNT: 44 FL (ref 35.9–50)
GFR SERPLBLD CREATININE-BSD FMLA CKD-EPI: 34 ML/MIN/1.73 M 2
GFR SERPLBLD CREATININE-BSD FMLA CKD-EPI: 36 ML/MIN/1.73 M 2
GLUCOSE BLD STRIP.AUTO-MCNC: 202 MG/DL (ref 65–99)
GLUCOSE BLD STRIP.AUTO-MCNC: 231 MG/DL (ref 65–99)
GLUCOSE BLD STRIP.AUTO-MCNC: 98 MG/DL (ref 65–99)
GLUCOSE SERPL-MCNC: 105 MG/DL (ref 65–99)
GLUCOSE SERPL-MCNC: 234 MG/DL (ref 65–99)
HCT VFR BLD AUTO: 34.9 % (ref 37–47)
HGB BLD-MCNC: 11.1 G/DL (ref 12–16)
MCH RBC QN AUTO: 27.3 PG (ref 27–33)
MCHC RBC AUTO-ENTMCNC: 31.8 G/DL (ref 32.2–35.5)
MCV RBC AUTO: 85.7 FL (ref 81.4–97.8)
PLATELET # BLD AUTO: 191 K/UL (ref 164–446)
PMV BLD AUTO: 11.3 FL (ref 9–12.9)
POTASSIUM SERPL-SCNC: 5 MMOL/L (ref 3.6–5.5)
POTASSIUM SERPL-SCNC: 5.4 MMOL/L (ref 3.6–5.5)
RBC # BLD AUTO: 4.07 M/UL (ref 4.2–5.4)
SODIUM SERPL-SCNC: 134 MMOL/L (ref 135–145)
SODIUM SERPL-SCNC: 138 MMOL/L (ref 135–145)
WBC # BLD AUTO: 3.9 K/UL (ref 4.8–10.8)

## 2024-09-24 PROCEDURE — 99232 SBSQ HOSP IP/OBS MODERATE 35: CPT | Performed by: HOSPITALIST

## 2024-09-24 PROCEDURE — 700102 HCHG RX REV CODE 250 W/ 637 OVERRIDE(OP): Performed by: INTERNAL MEDICINE

## 2024-09-24 PROCEDURE — 99232 SBSQ HOSP IP/OBS MODERATE 35: CPT | Performed by: INTERNAL MEDICINE

## 2024-09-24 PROCEDURE — 80197 ASSAY OF TACROLIMUS: CPT

## 2024-09-24 PROCEDURE — 700111 HCHG RX REV CODE 636 W/ 250 OVERRIDE (IP): Performed by: STUDENT IN AN ORGANIZED HEALTH CARE EDUCATION/TRAINING PROGRAM

## 2024-09-24 PROCEDURE — A9270 NON-COVERED ITEM OR SERVICE: HCPCS | Performed by: STUDENT IN AN ORGANIZED HEALTH CARE EDUCATION/TRAINING PROGRAM

## 2024-09-24 PROCEDURE — A9270 NON-COVERED ITEM OR SERVICE: HCPCS | Performed by: INTERNAL MEDICINE

## 2024-09-24 PROCEDURE — 700111 HCHG RX REV CODE 636 W/ 250 OVERRIDE (IP): Performed by: INTERNAL MEDICINE

## 2024-09-24 PROCEDURE — 82962 GLUCOSE BLOOD TEST: CPT

## 2024-09-24 PROCEDURE — 85027 COMPLETE CBC AUTOMATED: CPT

## 2024-09-24 PROCEDURE — 700102 HCHG RX REV CODE 250 W/ 637 OVERRIDE(OP): Performed by: STUDENT IN AN ORGANIZED HEALTH CARE EDUCATION/TRAINING PROGRAM

## 2024-09-24 PROCEDURE — 700105 HCHG RX REV CODE 258: Performed by: INTERNAL MEDICINE

## 2024-09-24 PROCEDURE — 80048 BASIC METABOLIC PNL TOTAL CA: CPT

## 2024-09-24 PROCEDURE — 770020 HCHG ROOM/CARE - TELE (206)

## 2024-09-24 RX ADMIN — APIXABAN 2.5 MG: 2.5 TABLET, FILM COATED ORAL at 17:13

## 2024-09-24 RX ADMIN — APIXABAN 2.5 MG: 2.5 TABLET, FILM COATED ORAL at 05:58

## 2024-09-24 RX ADMIN — AMIODARONE HYDROCHLORIDE 200 MG: 200 TABLET ORAL at 05:58

## 2024-09-24 RX ADMIN — INSULIN LISPRO 3 UNITS: 100 INJECTION, SOLUTION INTRAVENOUS; SUBCUTANEOUS at 17:18

## 2024-09-24 RX ADMIN — FUROSEMIDE 40 MG: 40 TABLET ORAL at 05:59

## 2024-09-24 RX ADMIN — DAPAGLIFLOZIN 5 MG: 10 TABLET, FILM COATED ORAL at 05:58

## 2024-09-24 RX ADMIN — INSULIN GLARGINE-YFGN 10 UNITS: 100 INJECTION, SOLUTION SUBCUTANEOUS at 17:17

## 2024-09-24 RX ADMIN — SODIUM BICARBONATE 650 MG: 650 TABLET ORAL at 05:58

## 2024-09-24 RX ADMIN — TACROLIMUS 1 MG: 1 CAPSULE ORAL at 05:58

## 2024-09-24 RX ADMIN — MYCOPHENOLATE MOFETIL 250 MG: 250 CAPSULE ORAL at 17:14

## 2024-09-24 RX ADMIN — ATORVASTATIN CALCIUM 20 MG: 20 TABLET, FILM COATED ORAL at 17:13

## 2024-09-24 RX ADMIN — SODIUM BICARBONATE 75 MEQ: 84 INJECTION, SOLUTION INTRAVENOUS at 07:22

## 2024-09-24 RX ADMIN — MYCOPHENOLATE MOFETIL 250 MG: 250 CAPSULE ORAL at 05:58

## 2024-09-24 RX ADMIN — METOPROLOL SUCCINATE 12.5 MG: 25 TABLET, EXTENDED RELEASE ORAL at 05:58

## 2024-09-24 RX ADMIN — AMLODIPINE BESYLATE 10 MG: 10 TABLET ORAL at 05:58

## 2024-09-24 RX ADMIN — ASPIRIN 81 MG: 81 TABLET, COATED ORAL at 05:58

## 2024-09-24 RX ADMIN — PREDNISONE 5 MG: 5 TABLET ORAL at 05:58

## 2024-09-24 RX ADMIN — LEVOTHYROXINE SODIUM 88 MCG: 0.09 TABLET ORAL at 05:58

## 2024-09-24 RX ADMIN — SODIUM BICARBONATE 650 MG: 650 TABLET ORAL at 17:14

## 2024-09-24 RX ADMIN — INSULIN LISPRO 3 UNITS: 100 INJECTION, SOLUTION INTRAVENOUS; SUBCUTANEOUS at 12:25

## 2024-09-24 RX ADMIN — TACROLIMUS 2 MG: 1 CAPSULE ORAL at 17:13

## 2024-09-24 ASSESSMENT — PAIN DESCRIPTION - PAIN TYPE: TYPE: ACUTE PAIN

## 2024-09-24 ASSESSMENT — ENCOUNTER SYMPTOMS
COUGH: 1
SHORTNESS OF BREATH: 0
DIARRHEA: 0
NAUSEA: 0
FEVER: 0
VOMITING: 0
ABDOMINAL PAIN: 0

## 2024-09-24 ASSESSMENT — FIBROSIS 4 INDEX: FIB4 SCORE: 1.29

## 2024-09-24 NOTE — DISCHARGE PLANNING
Community Health Worker Intake    Identified barriers to transportation, resources for the blind.  Resources provided to [place] [employer], [facility] .  Contact information provided to Tere Gallegos   Has PCP appointment scheduled for 9/30/24  Inpatient assessment completed.    CHW and  met with pt at bedside to introduce Community Care Management per referral from NAHOMI Ngo. Pt states she lives in an apartment with her son and . Pt's son has a vehicle and is normally the person to takes her to and from appointments, though she may need more assistance. Pt states they do not face any barriers to food at home. Pt is active with PCP ANGELITA Concepcion and already has a hospital follow up appointment scheduled to see her. Pt states she does not face any issues with her medications, other than having to pick them up every month. Pt does not use any DME at home, but states she does use a walking stick on occasion. Pt states her  has been going blind and she has had to start watching over him almost all the time, pt requests any resources that could assist her with taking care of her .       Plan:  Appointment reminder put in follow up section of AVS.  Pt provided with transportation resources for [employer] [employer] and [facility].  Pt educated on transportation options through her insurance.   Pt educated on mail in prescription options.   CHW will research resources for the blind, and families of the blind and follow up with pt.

## 2024-09-24 NOTE — PROGRESS NOTES
[facility] Medicine Daily Progress Note    Date of Service  2024    Chief Complaint  Tere Gallegos is a 73 y.o. female admitted 2024 with hyperkalemia    Hospital Course  Tere Gallegos is a 73 y.o. female who presented 2024 with flulike symptoms.  PMH of ESRD s/p  donor kidney transplant 10/2022 on immunosuppressive's, hypothyroidism, CAD s/p RCA stenting , hypertension, dyslipidemia, insulin-dependent diabetes, paroxysmal A-fib on anticoagulation treatment.  Symptoms began about 4 to 5 days ago and include chills, malaise, fatigue, generalized weakness and bodyaches, slight cough and a sore throat.  Yesterday the symptoms began improving but then she been having nausea/vomiting, denies any diarrhea, no urinary symptoms.     In the ED afebrile, hemodynamically stable.  Labs show potassium of 6.7, creatinine 1.86. Patient admitted evaluated by nephrology started on bicarb drip    Interval Problem Update    Review of systems and discussion of plan of care done with assistance of certified  via videoconference  Patient is afebrile on room air  On bicarb drip  Reviewed chemistry panel potassium 5.0 BUN 32 creatinine 1.49  Reviewed CBC hemoglobin 11.1  Reviewed respiratory PCR panel negative  I discussed with Dr Jian herman DC bicarb drip      I have discussed this patient's plan of care and discharge plan at IDT rounds today with Case Management, Nursing, Nursing leadership, and other members of the IDT team.    Consultants/Specialty  nephrology    Code Status  Full Code    Disposition  <MEDICALLYCLEARED>  I have placed the appropriate orders for post-discharge needs.    Review of Systems  Review of Systems   Constitutional:  Positive for malaise/fatigue.   Respiratory:  Positive for cough. Negative for shortness of breath.    Gastrointestinal:  Negative for abdominal pain, diarrhea, nausea and vomiting.        Physical Exam  Temp:  [36.2 °C (97.2  °F)-36.4 °C (97.5 °F)] 36.4 °C (97.5 °F)  Pulse:  [80-95] 80  Resp:  [16-18] 18  BP: (100-119)/(43-57) 116/56  SpO2:  [88 %-93 %] 88 %    Physical Exam  HENT:      Head: Atraumatic.   Cardiovascular:      Rate and Rhythm: Normal rate and regular rhythm.      Heart sounds: No murmur heard.  Pulmonary:      Effort: Pulmonary effort is normal.      Breath sounds: No rales.   Chest:      Chest wall: No tenderness.   Abdominal:      Palpations: Abdomen is soft.      Tenderness: There is no abdominal tenderness.   Musculoskeletal:         General: Swelling present.   Neurological:      Mental Status: She is alert. Mental status is at baseline.   Psychiatric:         Mood and Affect: Mood normal.         Fluids    Intake/Output Summary (Last 24 hours) at 9/24/2024 1322  Last data filed at 9/24/2024 1200  Gross per 24 hour   Intake 660 ml   Output 0 ml   Net 660 ml        Laboratory  Recent Labs     09/23/24  0054 09/24/24  0532   WBC 9.5 3.9*   RBC 4.81 4.07*   HEMOGLOBIN 12.7 11.1*   HEMATOCRIT 41.8 34.9*   MCV 86.9 85.7   MCH 26.4* 27.3   MCHC 30.4* 31.8*   RDW 46.0 44.0   PLATELETCT 236 191   MPV 11.0 11.3     Recent Labs     09/23/24  0054 09/23/24  0710 09/24/24  0532   SODIUM 136 136 138   POTASSIUM 6.7* 5.9* 5.0   CHLORIDE 107 106 104   CO2 16* 19* 20   GLUCOSE 187* 137* 105*   BUN 36* 36* 32*   CREATININE 1.86* 1.73* 1.49*   CALCIUM 10.4 9.7 9.3                   Imaging  US-RUQ   Final Result         1. No evidence for cholelithiasis or acute cholecystitis.   2. Right renal atrophy.      DX-CHEST-LIMITED (1 VIEW)   Final Result         Mild, diffuse interstitial prominence along with atelectasis versus infiltrate at the left lung base.           Assessment/Plan  * Hyperkalemia- (present on admission)  Assessment & Plan  Severe hyperkalemia presentation 6.7.  Likely due to SASHA  Resolved  DC bicarb drip  Monitor electrolytes    Viral illness- (present on admission)  Assessment & Plan  Flulike symptoms for the past  few days.  COVID, RSV, flu negative in the ED  Procalcitonin is negative,  Viral PCR negative    Patient clinically improved    Acute kidney injury superimposed on chronic kidney disease (HCC)- (present on admission)  Assessment & Plan  Creatinine on presentation 1.86, baseline about 1.4-1.5.  History of ESRD and was on dialysis in the past, s/p cadaveric transplant in     Renal function improved  Nephrology following discussed with Dr. Villar will DC bicarb drip and monitor  Avoid nephrotoxic agents    -donor kidney transplant recipient- (present on admission)  Assessment & Plan  Continue prednisone, mycophenolate, tacrolimus  Nephrology following    Paroxysmal atrial fibrillation (HCC)- (present on admission)  Assessment & Plan  Continue amiodarone metoprolol, apixaban    Hypothyroidism, acquired- (present on admission)  Assessment & Plan  Continue levothyroxine    Coronary artery disease with angina pectoris with documented spasm (HCC)- (present on admission)  Assessment & Plan  Continue current medical therapy with aspirin atorvastatin metoprolol    Mixed hyperlipidemia- (present on admission)  Assessment & Plan  Continue statin    Type 2 diabetes mellitus with stage 5 chronic kidney disease not on chronic dialysis, with long-term current use of insulin (HCC)- (present on admission)  Assessment & Plan  Continue Lantus and sliding scale insulin  Monitor CBGs and adjust accordingly    Primary hypertension- (present on admission)  Assessment & Plan  Continue amlodipine metoprolol and furosemide and monitor blood pressure         VTE prophylaxis:  apixaban    I have performed a physical exam and reviewed and updated ROS and Plan today (2024). In review of yesterday's note (2024), there are no changes except as documented above.

## 2024-09-24 NOTE — CARE PLAN
The patient is Stable - Low risk of patient condition declining or worsening    Shift Goals  Clinical Goals: monitor labs  Patient Goals: comfort  Family Goals: megan    Progress made toward(s) clinical / shift goals:    Problem: Pain - Standard  Goal: Alleviation of pain or a reduction in pain to the patient’s comfort goal  Outcome: Progressing     Problem: Knowledge Deficit - Standard  Goal: Patient and family/care givers will demonstrate understanding of plan of care, disease process/condition, diagnostic tests and medications  Outcome: Progressing     Problem: Hemodynamics  Goal: Patient's hemodynamics, fluid balance and neurologic status will be stable or improve  Outcome: Progressing     Problem: Fall Risk  Goal: Patient will remain free from falls  Outcome: Progressing       Patient is not progressing towards the following goals:

## 2024-09-24 NOTE — HOSPITAL COURSE
Tere Gallegos is a 73 y.o. female who presented 2024 with flulike symptoms.  PMH of ESRD s/p  donor kidney transplant 10/2022 on immunosuppressive's, hypothyroidism, CAD s/p RCA stenting , hypertension, dyslipidemia, insulin-dependent diabetes, paroxysmal A-fib on anticoagulation treatment.  Symptoms began about 4 to 5 days ago and include chills, malaise, fatigue, generalized weakness and bodyaches, slight cough and a sore throat.  Yesterday the symptoms began improving but then she been having nausea/vomiting, denies any diarrhea, no urinary symptoms.     In the ED afebrile, hemodynamically stable.  Labs show potassium of 6.7, creatinine 1.86. Patient admitted evaluated by nephrology started on bicarb drip

## 2024-09-24 NOTE — CARE PLAN
The patient is Stable - Low risk of patient condition declining or worsening    Shift Goals  Clinical Goals: monitor labs  Patient Goals: updates  Family Goals: megan    Progress made toward(s) clinical / shift goals: BMP being drawn Q6H. Last drawn potassium is 5.4      Problem: Pain - Standard  Goal: Alleviation of pain or a reduction in pain to the patient’s comfort goal  Outcome: Progressing     Problem: Knowledge Deficit - Standard  Goal: Patient and family/care givers will demonstrate understanding of plan of care, disease process/condition, diagnostic tests and medications  Outcome: Progressing     Problem: Hemodynamics  Goal: Patient's hemodynamics, fluid balance and neurologic status will be stable or improve  Outcome: Progressing     Problem: Respiratory  Goal: Patient will achieve/maintain optimum respiratory ventilation and gas exchange  Outcome: Progressing     Problem: Fall Risk  Goal: Patient will remain free from falls  Outcome: Progressing

## 2024-09-25 VITALS
SYSTOLIC BLOOD PRESSURE: 119 MMHG | OXYGEN SATURATION: 88 % | BODY MASS INDEX: 22.11 KG/M2 | RESPIRATION RATE: 18 BRPM | DIASTOLIC BLOOD PRESSURE: 63 MMHG | WEIGHT: 124.78 LBS | HEIGHT: 63 IN | HEART RATE: 62 BPM | TEMPERATURE: 97.5 F

## 2024-09-25 LAB
ANION GAP SERPL CALC-SCNC: 11 MMOL/L (ref 7–16)
ANION GAP SERPL CALC-SCNC: 12 MMOL/L (ref 7–16)
BUN SERPL-MCNC: 31 MG/DL (ref 8–22)
BUN SERPL-MCNC: 32 MG/DL (ref 8–22)
CALCIUM SERPL-MCNC: 9 MG/DL (ref 8.5–10.5)
CALCIUM SERPL-MCNC: 9 MG/DL (ref 8.5–10.5)
CHLORIDE SERPL-SCNC: 100 MMOL/L (ref 96–112)
CHLORIDE SERPL-SCNC: 102 MMOL/L (ref 96–112)
CO2 SERPL-SCNC: 24 MMOL/L (ref 20–33)
CO2 SERPL-SCNC: 26 MMOL/L (ref 20–33)
CREAT SERPL-MCNC: 1.57 MG/DL (ref 0.5–1.4)
CREAT SERPL-MCNC: 1.73 MG/DL (ref 0.5–1.4)
GFR SERPLBLD CREATININE-BSD FMLA CKD-EPI: 30 ML/MIN/1.73 M 2
GFR SERPLBLD CREATININE-BSD FMLA CKD-EPI: 34 ML/MIN/1.73 M 2
GLUCOSE BLD STRIP.AUTO-MCNC: 115 MG/DL (ref 65–99)
GLUCOSE BLD STRIP.AUTO-MCNC: 205 MG/DL (ref 65–99)
GLUCOSE BLD STRIP.AUTO-MCNC: 99 MG/DL (ref 65–99)
GLUCOSE SERPL-MCNC: 109 MG/DL (ref 65–99)
GLUCOSE SERPL-MCNC: 124 MG/DL (ref 65–99)
POTASSIUM SERPL-SCNC: 4.5 MMOL/L (ref 3.6–5.5)
POTASSIUM SERPL-SCNC: 4.7 MMOL/L (ref 3.6–5.5)
SODIUM SERPL-SCNC: 137 MMOL/L (ref 135–145)
SODIUM SERPL-SCNC: 138 MMOL/L (ref 135–145)
TACROLIMUS BLD-MCNC: 7.6 NG/ML (ref 5–20)

## 2024-09-25 PROCEDURE — 99239 HOSP IP/OBS DSCHRG MGMT >30: CPT | Performed by: HOSPITALIST

## 2024-09-25 PROCEDURE — 700102 HCHG RX REV CODE 250 W/ 637 OVERRIDE(OP): Performed by: STUDENT IN AN ORGANIZED HEALTH CARE EDUCATION/TRAINING PROGRAM

## 2024-09-25 PROCEDURE — 82962 GLUCOSE BLOOD TEST: CPT

## 2024-09-25 PROCEDURE — 700102 HCHG RX REV CODE 250 W/ 637 OVERRIDE(OP): Performed by: INTERNAL MEDICINE

## 2024-09-25 PROCEDURE — 700111 HCHG RX REV CODE 636 W/ 250 OVERRIDE (IP): Performed by: INTERNAL MEDICINE

## 2024-09-25 PROCEDURE — 700111 HCHG RX REV CODE 636 W/ 250 OVERRIDE (IP): Performed by: STUDENT IN AN ORGANIZED HEALTH CARE EDUCATION/TRAINING PROGRAM

## 2024-09-25 PROCEDURE — A9270 NON-COVERED ITEM OR SERVICE: HCPCS | Performed by: INTERNAL MEDICINE

## 2024-09-25 PROCEDURE — A9270 NON-COVERED ITEM OR SERVICE: HCPCS | Performed by: STUDENT IN AN ORGANIZED HEALTH CARE EDUCATION/TRAINING PROGRAM

## 2024-09-25 PROCEDURE — 80048 BASIC METABOLIC PNL TOTAL CA: CPT

## 2024-09-25 RX ADMIN — INSULIN LISPRO 3 UNITS: 100 INJECTION, SOLUTION INTRAVENOUS; SUBCUTANEOUS at 12:05

## 2024-09-25 RX ADMIN — DAPAGLIFLOZIN 5 MG: 10 TABLET, FILM COATED ORAL at 05:58

## 2024-09-25 RX ADMIN — AMLODIPINE BESYLATE 10 MG: 10 TABLET ORAL at 05:58

## 2024-09-25 RX ADMIN — METOPROLOL SUCCINATE 12.5 MG: 25 TABLET, EXTENDED RELEASE ORAL at 05:59

## 2024-09-25 RX ADMIN — AMIODARONE HYDROCHLORIDE 200 MG: 200 TABLET ORAL at 05:58

## 2024-09-25 RX ADMIN — SODIUM BICARBONATE 650 MG: 650 TABLET ORAL at 05:59

## 2024-09-25 RX ADMIN — ASPIRIN 81 MG: 81 TABLET, COATED ORAL at 05:58

## 2024-09-25 RX ADMIN — PREDNISONE 5 MG: 5 TABLET ORAL at 05:58

## 2024-09-25 RX ADMIN — APIXABAN 2.5 MG: 2.5 TABLET, FILM COATED ORAL at 05:58

## 2024-09-25 RX ADMIN — LEVOTHYROXINE SODIUM 88 MCG: 0.09 TABLET ORAL at 05:58

## 2024-09-25 RX ADMIN — FUROSEMIDE 40 MG: 40 TABLET ORAL at 05:58

## 2024-09-25 RX ADMIN — MYCOPHENOLATE MOFETIL 250 MG: 250 CAPSULE ORAL at 06:00

## 2024-09-25 RX ADMIN — TACROLIMUS 1 MG: 1 CAPSULE ORAL at 05:59

## 2024-09-25 ASSESSMENT — FIBROSIS 4 INDEX
FIB4 SCORE: 1.6
FIB4 SCORE: 1.6

## 2024-09-25 NOTE — PROGRESS NOTES
Patient being discharged via DCL. Pt educated on discharge instructions and new prescriptions, verbalized understanding. Follow up appointment to be made with PCP. PIV removed on floor per pt. Patient going home via car with family.      All information given by  via [facility] iPad.

## 2024-09-25 NOTE — DISCHARGE SUMMARY
Discharge Summary    CHIEF COMPLAINT ON ADMISSION  Chief Complaint   Patient presents with    Flu Like Symptoms    Vomiting       Reason for Admission  Flu Like Symptoms     Admission Date  2024    CODE STATUS  Full code    HPI & HOSPITAL COURSE    Tere Gallegos is a 73 y.o. female who presented 2024 with flulike symptoms.  PMH of ESRD s/p  donor kidney transplant 10/2022 on immunosuppressive's, hypothyroidism, CAD s/p RCA stenting 2016, hypertension, dyslipidemia, insulin-dependent diabetes, paroxysmal A-fib on anticoagulation treatment.  Symptoms began about 4 to 5 days ago and include chills, malaise, fatigue, generalized weakness and bodyaches, slight cough and a sore throat.  Yesterday the symptoms began improving but then she been having nausea/vomiting, denies any diarrhea, no urinary symptoms.     In the ED afebrile, hemodynamically stable.  Labs show potassium of 6.7, creatinine 1.86. Patient admitted evaluated by nephrology started on bicarb drip    The patient clinically improved she has been tolerating her diet and having good urine output.  Bicarb drip was discontinued yesterday.  Her hyperkalemia resolved and renal function is improved.  I discussed with Dr. Marie today and she is cleared for discharge from his standpoint    Therefore, she is discharged in good and stable condition to home with close outpatient follow-up.    The patient met 2-midnight criteria for an inpatient stay at the time of discharge.    Discharge Date  2024    FOLLOW UP ITEMS POST DISCHARGE  Follow-up with PCP and nephrology  Repeat chemistry panel at follow-up    DISCHARGE DIAGNOSES  Principal Problem:    Hyperkalemia (POA: Yes)  Active Problems:    Primary hypertension (Chronic) (POA: Yes)      Overview: Patient has a history of hypertension.  She is unclear as to which       medicine she is supposed to take but does note she takes levothyroxine for       the thyroid, amiodarone for history  of atrial fibrillation and amlodipine       10 mg daily for hypertension.  Was having an episode of elevated blood       pressure today 170s over 90s so she came in    Type 2 diabetes mellitus with stage 5 chronic kidney disease not on chronic dialysis, with long-term current use of insulin (HCC) (POA: Yes)      Overview: Patient is only on pioglitazone for her diabetes. She used to follow with       endocrinology but has not visited since 2021    Mixed hyperlipidemia (POA: Yes)    Coronary artery disease with angina pectoris with documented spasm (HCC) (POA: Yes)      Overview: 2 Synergy NOEMI to 100% RCA stent placed    Hypothyroidism, acquired (POA: Yes)    Paroxysmal atrial fibrillation (HCC) (POA: Yes)      Overview: Patient has a history of this and sees cardiologist has been on amiodarone       and metoprolol in the past.  She is not sure why she is not taking       metoprolol    -donor kidney transplant recipient (Chronic) (POA: Yes)    Acute kidney injury superimposed on chronic kidney disease (HCC) (POA: Yes)    Viral illness (POA: Yes)  Resolved Problems:    * No resolved hospital problems. *      FOLLOW UP  Future Appointments   Date Time Provider Department Center   2024  2:20 PM ANGELITA Concepcion   10/16/2024 10:40 AM ANGELITA Concepcion   2024 10:20 AM ANGELITA Concepcion   2024  4:20 PM Damon Crawley M.D. [facility] None   2024  3:00 PM Priyank Villar M.D. [facility].   2024  9:40 AM ANGELITA Concepcion SOctaviano Vinson   1/15/2025 11:20 AM ANGELITA Avalos A.P.R.N.  [address] [address] [facility] [address]  [facility] [address]  [phone]    Go on 2024  Please go to your hospital follow up appointment with ANGELITA Concepcion on 24 at 2:20 PM.    Priyank Villar M.D.  [address] [place] [id number]  [phone]    Follow  up        MEDICATIONS ON DISCHARGE     Medication List        CONTINUE taking these medications        Instructions   Accu-Chek Guide strip  Generic drug: glucose blood   USE ONE COVERED STRIP TO TEST BLOOD SUGAR THREE TIMES DAILY.     amiodarone 200 MG Tabs  Commonly known as: Cordarone   TOME FERNANDO TABLETA TODOS LOS MCCORMICK  Dose: 200 mg     amLODIPine 10 MG Tabs  Commonly known as: Norvasc   TOME FERNANDO TABLETA TODOS LOS MCCORMICK  Dose: 10 mg     apixaban 2.5mg Tabs  Commonly known as: Eliquis   Doctor's comments: This prescription is transmitted by a pharmacist under the authority of a collaborative practice agreement.  Take 1 Tablet by mouth 2 times a day.  Dose: 2.5 mg     atorvastatin 20 MG Tabs  Commonly known as: Lipitor   TAKE 1 TABLET BY MOUTH EVERY DAY IN THE EVENING  Dose: 20 mg     Farxiga 5 MG Tabs  Generic drug: dapagliflozin propanediol   Take 1 Tablet by mouth every day. Por diabetes  Dose: 5 mg     FreeStyle Pearl 3 [place]   1 Each every day.  Dose: 1 Each     FreeStyle Pearl 3 Sensor Misc   1 Each every 14 days.  Dose: 1 Each     furosemide 40 MG Tabs  Commonly known as: Lasix   TOME 1 TABLETA POR VIA ORAL TODOS LOS DIAS  Dose: 40 mg     Lancets   Doctor's comments: Or per formulary preference. ICD-10 code: E11.65 Uncontrolled type 2 Diabetes Mellitus  Use one covered lancet to test blood sugar three times daily.     levothyroxine 88 MCG Tabs  Commonly known as: Synthroid   Take 1 Tablet by mouth every morning on an empty stomach.  Dose: 88 mcg     losartan 50 MG Tabs  Commonly known as: Cozaar   Take 1 Tablet by mouth every day.  Dose: 50 mg     metoprolol SR 25 MG Tb24  Commonly known as: Toprol XL   Doctor's comments: Just updating sig. No need to fill now  Take 0.5 Tablets by mouth every day.  Dose: 12.5 mg     mycophenolate 250 MG Caps  Commonly known as: Cellcept   Take 1 Capsule by mouth 2 times a day.  Dose: 250 mg     predniSONE 5 MG Tabs  Commonly known as: Deltasone   Take 5 mg by mouth every  day.  Dose: 5 mg     sodium bicarbonate 650 MG Tabs  Commonly known as: Sodium Bicarbonate   Take 1 Tablet by mouth 2 times a day.  Dose: 650 mg     tacrolimus 1 MG Caps  Commonly known as: Prograf   Doctor's comments: No new med  Take 2 Capsules by mouth 2 times a day.  Dose: 2 mg     triamcinolone acetonide 0.1 % Crea  Commonly known as: Kenalog   Aplique bernardo cantidad del tamano de un guisante sobre la piel bernardo vez al fernanda janna sea necesario para la picazon o el sarpullido.              Allergies  No Known Allergies    DIET  No orders of the defined types were placed in this encounter.      ACTIVITY  As tolerated.  Weight bearing as tolerated    CONSULTATIONS  Nephrology        LABORATORY  Lab Results   Component Value Date    SODIUM 137 09/25/2024    POTASSIUM 4.5 09/25/2024    CHLORIDE 102 09/25/2024    CO2 24 09/25/2024    GLUCOSE 109 (H) 09/25/2024    BUN 31 (H) 09/25/2024    CREATININE 1.57 (H) 09/25/2024        Lab Results   Component Value Date    WBC 3.9 (L) 09/24/2024    HEMOGLOBIN 11.1 (L) 09/24/2024    HEMATOCRIT 34.9 (L) 09/24/2024    PLATELETCT 191 09/24/2024        Total time of the discharge process exceeds 35 minutes.

## 2024-09-25 NOTE — DISCHARGE PLANNING
TCN following. HTH/SCP chart reviewed. No new TCN needs identified. Please see prior TCN note from 9/23/24 for most recent discharge planning considerations if indicated. Current discharge considerations are noted to be for home with close outpatient f/u when medically cleared.  Per review, noted current 6 click scores 24 ADL's and 24 mobility and per kardex mobility documented at 15 feet X 2 no AD.       Completed:   Choice obtained: None.  See above.    Pt aware of [facility]'s blanket referral policy  SCP with [facility] P[facility]. Discussed possible outpatient transitional PCP follow up if indicated and pt in agreement; sent information to assist in scheduling.  [facility] Follow Up con Nurse Practitioner ANGELITA Yu sept 30, 2024 2:05 PM.

## 2024-09-25 NOTE — PROGRESS NOTES
IV and tele box removed. Monitor room notified. Home meds given back to patient. Patient being sent down to [facility] with transport.

## 2024-09-25 NOTE — PROGRESS NOTES
Bedside report received at 1905.     Patient is A&O x 4.  Patient on RA.  Patient denies pain.  Patient is a one person assist with FWW.  Bed alarm is on. Bed locked and in lowest position.  Call light is within reach. All questions answered at this time. Hourly rounding in place.

## 2024-09-25 NOTE — PROGRESS NOTES
Nephrology Daily Progress Note    Date of Service  2024    Chief Complaint  73 y.o. female with a history of ESRD status post  donor kidney transplant 10/31/2022 complicated by BK viremia, transplant ureteral stenosis requiring reimplantation, history of pancytopenia who presented 2024 with nausea and vomiting.    Interval Problem Update   -patient says she is urinating well.  Denies nausea, vomiting, chest pain, shortness of breath.    Review of Systems  Review of Systems   Constitutional:  Negative for fever.   Respiratory:  Negative for shortness of breath.    Cardiovascular:  Negative for chest pain.   Gastrointestinal:  Negative for abdominal pain.   All other systems reviewed and are negative.       Physical Exam  Temp:  [36.2 °C (97.2 °F)-36.4 °C (97.5 °F)] 36.4 °C (97.5 °F)  Pulse:  [77-95] 77  Resp:  [17-18] 18  BP: (100-119)/(43-57) 114/55  SpO2:  [88 %-93 %] 90 %    Physical Exam  Constitutional:       General: She is not in acute distress.     Appearance: She is well-developed.   HENT:      Head: Normocephalic and atraumatic.      Mouth/Throat:      Mouth: Mucous membranes are moist.      Pharynx: Oropharynx is clear. No oropharyngeal exudate.   Eyes:      General: No scleral icterus.     Extraocular Movements: Extraocular movements intact.   Neck:      Trachea: No tracheal deviation.   Cardiovascular:      Rate and Rhythm: Normal rate and regular rhythm.      Heart sounds: Normal heart sounds. No murmur heard.  Pulmonary:      Effort: Pulmonary effort is normal.      Breath sounds: No stridor. No rales.   Abdominal:      Palpations: Abdomen is soft.      Tenderness: There is no abdominal tenderness.   Musculoskeletal:         General: Normal range of motion.      Cervical back: Normal range of motion. No rigidity.      Right lower leg: No edema.      Left lower leg: No edema.   Skin:     General: Skin is warm and dry.   Neurological:      General: No focal deficit present.       Mental Status: She is alert and oriented to person, place, and time.   Psychiatric:         Mood and Affect: Mood normal.         Behavior: Behavior normal.         Fluids    Intake/Output Summary (Last 24 hours) at 9/24/2024 1903  Last data filed at 9/24/2024 1700  Gross per 24 hour   Intake 900 ml   Output 0 ml   Net 900 ml       Laboratory  Labs reviewed, pertinent labs below.  Recent Labs     09/23/24  0054 09/24/24  0532   WBC 9.5 3.9*   RBC 4.81 4.07*   HEMOGLOBIN 12.7 11.1*   HEMATOCRIT 41.8 34.9*   MCV 86.9 85.7   MCH 26.4* 27.3   MCHC 30.4* 31.8*   RDW 46.0 44.0   PLATELETCT 236 191   MPV 11.0 11.3     Recent Labs     09/23/24  0710 09/24/24  0532 09/24/24  1238   SODIUM 136 138 134*   POTASSIUM 5.9* 5.0 5.4   CHLORIDE 106 104 97   CO2 19* 20 22   GLUCOSE 137* 105* 234*   BUN 36* 32* 33*   CREATININE 1.73* 1.49* 1.59*   CALCIUM 9.7 9.3 9.6               URINALYSIS:  Lab Results   Component Value Date/Time    COLORURINE Yellow 07/01/2024 0655    CLARITY Hazy (A) 07/01/2024 0655    SPECGRAVITY 1.015 07/01/2024 0655    PHURINE 6.5 07/01/2024 0655    KETONES Negative 07/01/2024 0655    PROTEINURIN Negative 07/01/2024 0655    BILIRUBINUR Negative 07/01/2024 0655    UROBILU 0.2 09/22/2023 2235    NITRITE Positive (A) 07/01/2024 0655    LEUKESTERAS Negative 07/01/2024 0655    OCCULTBLOOD Negative 07/01/2024 0655     UPC  Lab Results   Component Value Date/Time    TOTPROTUR 16.0 (H) 09/14/2023 0707      Lab Results   Component Value Date/Time    CREATININEU 81.98 09/14/2023 0707         Imaging interpreted by radiologist. Imaging reports reviewed with pertinent findings below  US-RUQ   Final Result         1. No evidence for cholelithiasis or acute cholecystitis.   2. Right renal atrophy.      DX-CHEST-LIMITED (1 VIEW)   Final Result         Mild, diffuse interstitial prominence along with atelectasis versus infiltrate at the left lung base.            Current Facility-Administered Medications   Medication Dose  Route Frequency Provider Last Rate Last Admin    acetaminophen (Tylenol) tablet 650 mg  650 mg Oral Q6HRS PRN Melony Palma M.D.        labetalol (Normodyne/Trandate) injection 10 mg  10 mg Intravenous Q4HRS PRN Melony Palma M.D.        insulin GLARGINE (Lantus,Semglee) injection  10 Units Subcutaneous Q EVENING Melony Palma M.D.   10 Units at 09/24/24 1717    And    insulin lispro (HumaLOG,AdmeLOG) injection  2-9 Units Subcutaneous Q6HRS Melony Palma M.D.   3 Units at 09/24/24 1718    And    dextrose 50% (D50W) injection 25 g  25 g Intravenous Q15 MIN PRN Melony Palma M.D.        amiodarone (Cordarone) tablet 200 mg  200 mg Oral DAILY Melony Palma M.D.   200 mg at 09/24/24 0558    amLODIPine (Norvasc) tablet 10 mg  10 mg Oral DAILY Melony Palma M.D.   10 mg at 09/24/24 0558    apixaban (Eliquis) tablet 2.5 mg  2.5 mg Oral BID Melony Palma M.D.   2.5 mg at 09/24/24 1713    atorvastatin (Lipitor) tablet 20 mg  20 mg Oral Q EVENING Melony Palma M.D.   20 mg at 09/24/24 1713    dapagliflozin propanediol (Farxiga) tablet 5 mg  5 mg Oral DAILY Melony Palma M.D.   5 mg at 09/24/24 0558    levothyroxine (Synthroid) tablet 88 mcg  88 mcg Oral AM ES Melony Palma M.D.   88 mcg at 09/24/24 0558    metoprolol SR (Toprol XL) tablet 12.5 mg  12.5 mg Oral DAILY Melony Palma M.D.   12.5 mg at 09/24/24 0558    mycophenolate (Cellcept) capsule 250 mg  250 mg Oral BID Melony Palma M.D.   250 mg at 09/24/24 1714    sodium bicarbonate tablet 650 mg  650 mg Oral BID Melony Palma M.D.   650 mg at 09/24/24 1714    predniSONE (Deltasone) tablet 5 mg  5 mg Oral DAILY Melony Palma M.D.   5 mg at 09/24/24 0558    aspirin EC tablet 81 mg  81 mg Oral DAILY Melony Palma M.D.   81 mg at 09/24/24 0558    furosemide (Lasix) tablet 40 mg  40 mg Oral Q DAY Priyank Villar M.D.   40 mg at 09/24/24 0559    tacrolimus (Prograf) capsule 1 mg  1 mg Oral Daily-0600 Priyank Villar M.D.   1 mg at 09/24/24 0558     tacrolimus (Prograf) capsule 2 mg  2 mg Oral Q EVENING Priyank Villar M.D.   2 mg at 24 1713         Assessment/Plan  73 y.o. female with a history of ESRD status post  donor kidney transplant 10/31/2022 complicated by BK viremia, transplant ureteral stenosis requiring reimplantation, history of pancytopenia who presented 2024 with nausea and vomiting.     1.  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  Patient had mild SASHA on admission, slightly improving.  Nonoliguric per patient report.  Avoid NSAIDs and other nephrotoxins.  Discontinue IV fluids.     2.  Immunosuppression.  Patient is on tacrolimus 1 mg in the morning and 2 mg in the evening, mycophenolate 250 mg p.o. twice daily, prednisone 5 mg daily.  Repeat tacrolimus level collected, result pending.     3.  Nausea and vomiting, reason for admission, remains improved.  Continue oral diet as tolerated.     4.  Hyperkalemia, noted on admission.  Unclear etiology.  Resolved.  Continue Lasix 40 mg daily.  No need for dialysis or Lokelma.     5.  Metabolic acidosis, improved.  Discontinue bicarbonate infusion.  Check renal function panel daily.     6.  Pancytopenia, mild, platelets are normal, but WBC and hemoglobin mildly low.  Check CBC daily.      Priyank Villar MD  Nephrology  [facility]

## 2024-09-25 NOTE — CARE PLAN
The patient is Stable - Low risk of patient condition declining or worsening    Shift Goals  Clinical Goals: Monitor heart rhythm and blood glucose  Patient Goals: Updates, rest comfortably  Family Goals: megan    Progress made toward(s) clinical / shift goals:  Patient ambulated at least twice by end of shift with SBA. Heart rhythm was monitored this shift and care team informed of changes if applicable. Blood glucose checked per protocol. A  was used to communicate POC and updates with pt. All needs met this shift.    Patient is not progressing towards the following goals:

## 2024-09-27 NOTE — PROGRESS NOTES
Community Health Worker Intake    Identified no barriers.  Resources provided to [facility].  Contact information provided to Tere Gallegos   Has PCP appointment scheduled for 9/30/24  Outpatient assessment completed.  Did the patient receive medications post discharge: Yes    CHW called pt to follow up post discharge. Pt states she is doing ok, not 100% yet, but getting better.  CHW offered to give pt contact number for [facility] as the pt had expressed needing some assistance with her  who is losing his eyesight. Pt accepted and was given [facility] number, [phone].    Plan:  CHW will graduate pt from program as she has no more needs at this time.

## 2024-09-30 ENCOUNTER — OFFICE VISIT (OUTPATIENT)
Dept: MEDICAL GROUP | Facility: MEDICAL CENTER | Age: 74
End: 2024-09-30
Payer: MEDICARE

## 2024-09-30 VITALS
HEART RATE: 63 BPM | OXYGEN SATURATION: 94 % | WEIGHT: 127 LBS | BODY MASS INDEX: 22.5 KG/M2 | TEMPERATURE: 97 F | SYSTOLIC BLOOD PRESSURE: 118 MMHG | DIASTOLIC BLOOD PRESSURE: 54 MMHG | HEIGHT: 63 IN

## 2024-09-30 DIAGNOSIS — E11.22 TYPE 2 DIABETES MELLITUS WITH STAGE 5 CHRONIC KIDNEY DISEASE NOT ON CHRONIC DIALYSIS, WITH LONG-TERM CURRENT USE OF INSULIN (HCC): ICD-10-CM

## 2024-09-30 DIAGNOSIS — M54.42 CHRONIC MIDLINE LOW BACK PAIN WITH BILATERAL SCIATICA: ICD-10-CM

## 2024-09-30 DIAGNOSIS — R21 RASH: ICD-10-CM

## 2024-09-30 DIAGNOSIS — M54.41 CHRONIC MIDLINE LOW BACK PAIN WITH BILATERAL SCIATICA: ICD-10-CM

## 2024-09-30 DIAGNOSIS — N18.5 TYPE 2 DIABETES MELLITUS WITH STAGE 5 CHRONIC KIDNEY DISEASE NOT ON CHRONIC DIALYSIS, WITH LONG-TERM CURRENT USE OF INSULIN (HCC): ICD-10-CM

## 2024-09-30 DIAGNOSIS — G89.29 CHRONIC MIDLINE LOW BACK PAIN WITH BILATERAL SCIATICA: ICD-10-CM

## 2024-09-30 DIAGNOSIS — Z79.4 TYPE 2 DIABETES MELLITUS WITH STAGE 5 CHRONIC KIDNEY DISEASE NOT ON CHRONIC DIALYSIS, WITH LONG-TERM CURRENT USE OF INSULIN (HCC): ICD-10-CM

## 2024-09-30 RX ORDER — INSULIN ASPART 100 [IU]/ML
15 INJECTION, SOLUTION INTRAVENOUS; SUBCUTANEOUS
COMMUNITY

## 2024-09-30 RX ORDER — INSULIN GLARGINE 100 [IU]/ML
10 INJECTION, SOLUTION SUBCUTANEOUS 2 TIMES DAILY
COMMUNITY

## 2024-09-30 RX ORDER — PEN NEEDLE, DIABETIC 32GX 5/32"
NEEDLE, DISPOSABLE MISCELLANEOUS
Qty: 200 EACH | Refills: 3 | Status: SHIPPED | OUTPATIENT
Start: 2024-09-30

## 2024-09-30 RX ORDER — TRIAMCINOLONE ACETONIDE 1 MG/G
CREAM TOPICAL
Qty: 45 G | Refills: 2 | Status: SHIPPED | OUTPATIENT
Start: 2024-09-30

## 2024-09-30 ASSESSMENT — FIBROSIS 4 INDEX: FIB4 SCORE: 1.6

## 2024-10-01 ENCOUNTER — ANTICOAGULATION VISIT (OUTPATIENT)
Dept: VASCULAR LAB | Facility: MEDICAL CENTER | Age: 74
End: 2024-10-01
Attending: INTERNAL MEDICINE
Payer: MEDICARE

## 2024-10-01 VITALS — DIASTOLIC BLOOD PRESSURE: 69 MMHG | SYSTOLIC BLOOD PRESSURE: 127 MMHG | HEART RATE: 73 BPM

## 2024-10-01 DIAGNOSIS — Z79.01 CHRONIC ANTICOAGULATION: ICD-10-CM

## 2024-10-01 DIAGNOSIS — D68.69 SECONDARY HYPERCOAGULABLE STATE (HCC): ICD-10-CM

## 2024-10-01 DIAGNOSIS — I48.0 PAROXYSMAL ATRIAL FIBRILLATION (HCC): ICD-10-CM

## 2024-10-01 PROCEDURE — 99213 OFFICE O/P EST LOW 20 MIN: CPT

## 2024-10-01 NOTE — PROGRESS NOTES
Subjective:     Tere Gallegos is a 73 y.o. female who presents for Hospital Follow-up.    HPI:     History of Present Illness  The patient presents for follow-up after her recent hospital stay. A  was used for the visit.    During her hospital stay, she was informed of elevated potassium levels which had normalized, treated with sodium bicarb infusion, nephrology was consulted. Renal function back to baseline.     She reports experiencing fatigue in her legs after prolonged sitting, making it difficult to stand. Additionally, she has been experiencing back pain that radiates downwards into her legs when bending over to  objects. This issue has been ongoing for a significant period.    She is currently on NovoLog insulin and lantus insulin per endocrinology. She is confused about the timing of her doses, requesting refill for pen needles. Despite this regimen, her blood sugar levels remain high, often in the 200s or 300s. She also reports numbness and tingling in her mouth when using insulin.     Current medicines (including reconciliation performed today)  Current Outpatient Medications   Medication Sig Dispense Refill    insulin aspart (NOVOLOG) 100 UNIT/ML Solution Inject 15 Units under the skin 4 Times a Day,Before Meals and at Bedtime.      insulin glargine (LANTUS) 100 UNIT/ML SC SOLN Inject 10 Units under the skin 2 times a day.      BD PEN NEEDLE PETE 2ND GEN USE AS DIRECTED WITH INSULIN PENS SIX TIMES DAILY 200 Each 3    triamcinolone acetonide (KENALOG) 0.1 % Cream Aplique bernardo cantidad del tamano de un guisante sobre la piel bernardo vez al fernanda janna sea necesario para la picazon o el sarpullido. 45 g 2    amLODIPine (NORVASC) 10 MG Tab TOME BERNARDO TABLETA TODOS LOS MCCORMICK 90 Tablet 1    amiodarone (CORDARONE) 200 MG Tab TOME BERNARDO TABLETA TODOS LOS MCCORMICK 90 Tablet 1    Continuous Glucose Sensor (FREESTYLE BALDOMERO 3 SENSOR) Misc 1 Each every 14 days. 6 Each 3    Continuous Glucose   ("[employer]STYLE BALDOMERO 3 READER) Device 1 Each every day. 1 Each 0    levothyroxine (SYNTHROID) 88 MCG Tab Take 1 Tablet by mouth every morning on an empty stomach. 90 Tablet 3    furosemide (LASIX) 40 MG Tab TOME 1 TABLETA POR VIA ORAL TODOS LOS MCCORMICK 90 Tablet 2    apixaban (ELIQUIS) 2.5mg Tab Take 1 Tablet by mouth 2 times a day. 60 Tablet 5    FARXIGA 5 MG Tab Take 1 Tablet by mouth every day. Por diabetes 90 Tablet 3    metoprolol SR (TOPROL XL) 25 MG TABLET SR 24 HR Take 0.5 Tablets by mouth every day. 45 Tablet 3    glucose blood (ACCU-CHEK GUIDE) strip USE ONE COVERED STRIP TO TEST BLOOD SUGAR THREE TIMES DAILY. 100 Strip 3    Lancets Use one covered lancet to test blood sugar three times daily. 100 Each 3    losartan (COZAAR) 50 MG Tab Take 1 Tablet by mouth every day. 90 Tablet 3    atorvastatin (LIPITOR) 20 MG Tab TAKE 1 TABLET BY MOUTH EVERY DAY IN THE EVENING 90 Tablet 3    sodium bicarbonate (SODIUM BICARBONATE) 650 MG Tab Take 1 Tablet by mouth 2 times a day. 180 Tablet 3    tacrolimus (PROGRAF) 1 MG Cap Take 2 Capsules by mouth 2 times a day. (Patient taking differently: Take 1-2 mg by mouth 2 times a day. 1 mg (1 tab) every AM  2 mg (2 tab) every PM) 120 Capsule 0    mycophenolate (CELLCEPT) 250 MG Cap Take 1 Capsule by mouth 2 times a day. 20 Capsule 0    predniSONE (DELTASONE) 5 MG Tab Take 5 mg by mouth every day.       No current facility-administered medications for this visit.       Allergies:   Patient has no known allergies.    Social History     Tobacco Use    Smoking status: Never    Smokeless tobacco: Never   Vaping Use    Vaping status: Never Used   Substance Use Topics    Alcohol use: No     Alcohol/week: 0.0 oz    Drug use: No       ROS:  No chest pain, no shortness of breath, no abdominal pain  Positive ROS as per HPI.  All other systems reviewed and are negative.    Objective:     Vitals:    09/30/24 1426   BP: 118/54   Pulse: 63   Temp: 36.1 °C (97 °F)   TempSrc: Temporal   SpO2:  "94%   Weight: 57.6 kg (127 lb)   Height: 1.598 m (5' 2.9\")     Body mass index is 22.57 kg/m².    Physical Exam:  Constitutional: Alert, no distress.  Skin: Warm, dry, good turgor, no rashes in visible areas.  Eye: Equal, round and reactive, conjunctiva clear, lids normal.  ENMT: Lips without lesions, good dentition  Respiratory: Unlabored respiratory effort, lungs clear to auscultation, no wheezes, no ronchi.  Cardiovascular: Normal S1, S2, no murmur, no edema.  Psych: Alert and oriented x3, normal affect and mood.    Results  Laboratory Studies  Potassium levels were high but normalized after treatment.    Assessment and Plan:     Assessment & Plan  1. Hyperkalemia.  Stable, resolved. She has an upcoming appointment with her nephrologist on 12/12/2024.   SASHA resolved, back to baseline  Cause likely SASHA 2/2 vomiting and dehydration from acute illness    2. Diabetes Mellitus.  Her diabetes specialist has adjusted her insulin dosages to 15 units of NovoLog prior to meals and 10 units of Lantus in the morning and evening. She is advised to monitor her blood sugar levels closely to prevent hypoglycemia. A prescription for pen needles will be [facility] to [facility].    3. Low Back Pain.  An x-ray of her lower back will be ordered to investigate the cause of her leg pain. A referral to Physiatry has been made.    Follow-up  Return in November 2024 for follow up.    1. Type 2 diabetes mellitus with stage 5 chronic kidney disease not on chronic dialysis, with long-term current use of insulin (HCC)  - BD PEN NEEDLE PETE 2ND GEN; USE AS DIRECTED WITH INSULIN PENS SIX TIMES DAILY  Dispense: 200 Each; Refill: 3    2. Chronic midline low back pain with bilateral sciatica  - DX-LUMBAR SPINE-2 OR 3 VIEWS; Future  - Referral to Pain Clinic    3. Rash  - triamcinolone acetonide (KENALOG) 0.1 % Cream; Aplique bernardo cantidad del tamano de un guisante sobre la piel bernardo vez al fernanda janna sea necesario para la picazon o el sarpullido.  Dispense: 45 " g; Refill: 2    Other orders  - insulin aspart (NOVOLOG) 100 UNIT/ML Solution; Inject 15 Units under the skin 4 Times a Day,Before Meals and at Bedtime.  - insulin glargine (LANTUS) 100 UNIT/ML SC SOLN; Inject 10 Units under the skin 2 times a day.      - Chart and discharge summary were reviewed.   - Hospitalization and results reviewed with patient.   - Medications reviewed including instructions regarding high risk medications, dosing and side effects.  - Recommended Services: No services needed at this time  - Advance directive/POLST on file?  No     Follow-up:Return in about 8 weeks (around 11/25/2024).    Face-to-face transitional care management services with [facility] (today's visit is within 14 days post discharge & LACE+ score of 28-58) medical decision complexity were provided.     The MA is performing the above orders under the direction of Dr. Priest    Please note that this dictation was created using voice recognition software. I have made every reasonable attempt to correct obvious errors, but I expect that there are errors of grammar and possibly content that I did not discover before finalizing the note.      Attestation      Verbal consent was acquired by the patient to use [employer] ambient listening note generation during this visit Yes      LACE+ Historical Score Over Time (0-28: Low, 29-58: Medium, 59+: High): 74

## 2024-10-09 ENCOUNTER — OFFICE VISIT (OUTPATIENT)
Dept: PHYSICAL MEDICINE AND REHAB | Facility: MEDICAL CENTER | Age: 74
End: 2024-10-09
Payer: MEDICARE

## 2024-10-09 VITALS
BODY MASS INDEX: 22.96 KG/M2 | SYSTOLIC BLOOD PRESSURE: 128 MMHG | DIASTOLIC BLOOD PRESSURE: 52 MMHG | WEIGHT: 124.78 LBS | OXYGEN SATURATION: 97 % | TEMPERATURE: 97.2 F | HEART RATE: 47 BPM | HEIGHT: 62 IN

## 2024-10-09 DIAGNOSIS — M54.16 LUMBAR RADICULOPATHY: Primary | ICD-10-CM

## 2024-10-09 DIAGNOSIS — E11.22 TYPE 2 DIABETES MELLITUS WITH STAGE 5 CHRONIC KIDNEY DISEASE NOT ON CHRONIC DIALYSIS, WITH LONG-TERM CURRENT USE OF INSULIN (HCC): ICD-10-CM

## 2024-10-09 DIAGNOSIS — N18.5 TYPE 2 DIABETES MELLITUS WITH STAGE 5 CHRONIC KIDNEY DISEASE NOT ON CHRONIC DIALYSIS, WITH LONG-TERM CURRENT USE OF INSULIN (HCC): ICD-10-CM

## 2024-10-09 DIAGNOSIS — Z94.0 DECEASED-DONOR KIDNEY TRANSPLANT RECIPIENT: Chronic | ICD-10-CM

## 2024-10-09 DIAGNOSIS — I48.0 PAROXYSMAL ATRIAL FIBRILLATION (HCC): ICD-10-CM

## 2024-10-09 DIAGNOSIS — G89.29 CHRONIC MIDLINE LOW BACK PAIN WITH LEFT-SIDED SCIATICA: ICD-10-CM

## 2024-10-09 DIAGNOSIS — M54.42 CHRONIC MIDLINE LOW BACK PAIN WITH LEFT-SIDED SCIATICA: ICD-10-CM

## 2024-10-09 DIAGNOSIS — Z79.4 TYPE 2 DIABETES MELLITUS WITH STAGE 5 CHRONIC KIDNEY DISEASE NOT ON CHRONIC DIALYSIS, WITH LONG-TERM CURRENT USE OF INSULIN (HCC): ICD-10-CM

## 2024-10-09 PROCEDURE — 3074F SYST BP LT 130 MM HG: CPT | Performed by: STUDENT IN AN ORGANIZED HEALTH CARE EDUCATION/TRAINING PROGRAM

## 2024-10-09 PROCEDURE — 1125F AMNT PAIN NOTED PAIN PRSNT: CPT | Performed by: STUDENT IN AN ORGANIZED HEALTH CARE EDUCATION/TRAINING PROGRAM

## 2024-10-09 PROCEDURE — 99204 OFFICE O/P NEW MOD 45 MIN: CPT | Performed by: STUDENT IN AN ORGANIZED HEALTH CARE EDUCATION/TRAINING PROGRAM

## 2024-10-09 PROCEDURE — 3078F DIAST BP <80 MM HG: CPT | Performed by: STUDENT IN AN ORGANIZED HEALTH CARE EDUCATION/TRAINING PROGRAM

## 2024-10-09 RX ORDER — GABAPENTIN 100 MG/1
CAPSULE ORAL
Qty: 90 CAPSULE | Refills: 1 | Status: SHIPPED | OUTPATIENT
Start: 2024-10-09

## 2024-10-09 ASSESSMENT — PAIN SCALES - GENERAL: PAINLEVEL: 2=MINIMAL-SLIGHT

## 2024-10-09 ASSESSMENT — FIBROSIS 4 INDEX: FIB4 SCORE: 1.6

## 2024-10-09 ASSESSMENT — PATIENT HEALTH QUESTIONNAIRE - PHQ9
CLINICAL INTERPRETATION OF PHQ2 SCORE: 5
SUM OF ALL RESPONSES TO PHQ QUESTIONS 1-9: 16
5. POOR APPETITE OR OVEREATING: 3 - NEARLY EVERY DAY

## 2024-10-14 ENCOUNTER — HOSPITAL ENCOUNTER (INPATIENT)
Facility: MEDICAL CENTER | Age: 74
LOS: 1 days | DRG: 698 | End: 2024-10-16
Attending: EMERGENCY MEDICINE | Admitting: HOSPITALIST
Payer: MEDICARE

## 2024-10-14 DIAGNOSIS — Z94.0 KIDNEY TRANSPLANT RECIPIENT: Chronic | ICD-10-CM

## 2024-10-14 DIAGNOSIS — Z79.899 IMMUNOSUPPRESSION DUE TO DRUG THERAPY (HCC): ICD-10-CM

## 2024-10-14 DIAGNOSIS — D84.821 IMMUNOSUPPRESSION DUE TO DRUG THERAPY (HCC): ICD-10-CM

## 2024-10-14 DIAGNOSIS — G62.9 POLYNEUROPATHY: ICD-10-CM

## 2024-10-14 DIAGNOSIS — Z94.0 DECEASED-DONOR KIDNEY TRANSPLANT RECIPIENT: Chronic | ICD-10-CM

## 2024-10-14 DIAGNOSIS — N17.9 ACUTE KIDNEY INJURY SUPERIMPOSED ON CHRONIC KIDNEY DISEASE (HCC): ICD-10-CM

## 2024-10-14 DIAGNOSIS — N18.9 ACUTE KIDNEY INJURY SUPERIMPOSED ON CHRONIC KIDNEY DISEASE (HCC): ICD-10-CM

## 2024-10-14 DIAGNOSIS — R00.1 BRADYCARDIA: ICD-10-CM

## 2024-10-14 DIAGNOSIS — E87.5 HYPERKALEMIA: ICD-10-CM

## 2024-10-14 DIAGNOSIS — E11.22 TYPE 2 DIABETES MELLITUS WITH CHRONIC KIDNEY DISEASE, WITHOUT LONG-TERM CURRENT USE OF INSULIN, UNSPECIFIED CKD STAGE (HCC): ICD-10-CM

## 2024-10-14 PROCEDURE — 93005 ELECTROCARDIOGRAM TRACING: CPT

## 2024-10-14 PROCEDURE — 93005 ELECTROCARDIOGRAM TRACING: CPT | Performed by: EMERGENCY MEDICINE

## 2024-10-14 PROCEDURE — 99291 CRITICAL CARE FIRST HOUR: CPT

## 2024-10-14 ASSESSMENT — PAIN DESCRIPTION - PAIN TYPE: TYPE: ACUTE PAIN

## 2024-10-14 ASSESSMENT — FIBROSIS 4 INDEX: FIB4 SCORE: 1.6

## 2024-10-15 ENCOUNTER — APPOINTMENT (OUTPATIENT)
Dept: RADIOLOGY | Facility: MEDICAL CENTER | Age: 74
DRG: 698 | End: 2024-10-15
Attending: EMERGENCY MEDICINE
Payer: MEDICARE

## 2024-10-15 PROBLEM — R00.1 BRADYCARDIA: Status: ACTIVE | Noted: 2024-10-15

## 2024-10-15 PROBLEM — E11.29 TYPE 2 DIABETES MELLITUS WITH KIDNEY COMPLICATION, WITHOUT LONG-TERM CURRENT USE OF INSULIN (HCC): Status: ACTIVE | Noted: 2024-10-15

## 2024-10-15 PROBLEM — R73.9 HYPERGLYCEMIA: Status: ACTIVE | Noted: 2024-10-15

## 2024-10-15 LAB
25(OH)D3 SERPL-MCNC: 10 NG/ML (ref 30–100)
ALBUMIN SERPL BCP-MCNC: 4 G/DL (ref 3.2–4.9)
ALBUMIN/GLOB SERPL: 1.5 G/DL
ALP SERPL-CCNC: 95 U/L (ref 30–99)
ALT SERPL-CCNC: 102 U/L (ref 2–50)
ANION GAP SERPL CALC-SCNC: 10 MMOL/L (ref 7–16)
ANION GAP SERPL CALC-SCNC: 11 MMOL/L (ref 7–16)
ANION GAP SERPL CALC-SCNC: 12 MMOL/L (ref 7–16)
ANION GAP SERPL CALC-SCNC: 13 MMOL/L (ref 7–16)
ANION GAP SERPL CALC-SCNC: 14 MMOL/L (ref 7–16)
ANION GAP SERPL CALC-SCNC: 15 MMOL/L (ref 7–16)
ANION GAP SERPL CALC-SCNC: 15 MMOL/L (ref 7–16)
APPEARANCE UR: CLEAR
AST SERPL-CCNC: 61 U/L (ref 12–45)
BACTERIA #/AREA URNS HPF: ABNORMAL /HPF
BASE EXCESS BLDA CALC-SCNC: -9 MMOL/L (ref -4–3)
BASE EXCESS BLDV CALC-SCNC: -1 MMOL/L (ref -4–3)
BASE EXCESS BLDV CALC-SCNC: -9 MMOL/L
BASE EXCESS BLDV CALC-SCNC: -9 MMOL/L (ref -4–3)
BASOPHILS # BLD AUTO: 0.2 % (ref 0–1.8)
BASOPHILS # BLD: 0.01 K/UL (ref 0–0.12)
BILIRUB SERPL-MCNC: 0.4 MG/DL (ref 0.1–1.5)
BILIRUB UR QL STRIP.AUTO: NEGATIVE
BODY TEMPERATURE: 36.3 CENTIGRADE
BODY TEMPERATURE: ABNORMAL DEGREES
BUN SERPL-MCNC: 46 MG/DL (ref 8–22)
BUN SERPL-MCNC: 48 MG/DL (ref 8–22)
BUN SERPL-MCNC: 49 MG/DL (ref 8–22)
BUN SERPL-MCNC: 49 MG/DL (ref 8–22)
BUN SERPL-MCNC: 51 MG/DL (ref 8–22)
BUN SERPL-MCNC: 51 MG/DL (ref 8–22)
BUN SERPL-MCNC: 55 MG/DL (ref 8–22)
CA-I BLD ISE-SCNC: 1.4 MMOL/L (ref 1.1–1.3)
CA-I BLD ISE-SCNC: 1.6 MMOL/L (ref 1.1–1.3)
CALCIUM ALBUM COR SERPL-MCNC: 9.9 MG/DL (ref 8.5–10.5)
CALCIUM SERPL-MCNC: 10.3 MG/DL (ref 8.5–10.5)
CALCIUM SERPL-MCNC: 10.4 MG/DL (ref 8.5–10.5)
CALCIUM SERPL-MCNC: 10.6 MG/DL (ref 8.5–10.5)
CALCIUM SERPL-MCNC: 10.6 MG/DL (ref 8.5–10.5)
CALCIUM SERPL-MCNC: 11.1 MG/DL (ref 8.5–10.5)
CALCIUM SERPL-MCNC: 12.1 MG/DL (ref 8.5–10.5)
CALCIUM SERPL-MCNC: 9.9 MG/DL (ref 8.5–10.5)
CHLORIDE SERPL-SCNC: 100 MMOL/L (ref 96–112)
CHLORIDE SERPL-SCNC: 100 MMOL/L (ref 96–112)
CHLORIDE SERPL-SCNC: 103 MMOL/L (ref 96–112)
CHLORIDE SERPL-SCNC: 104 MMOL/L (ref 96–112)
CHLORIDE SERPL-SCNC: 97 MMOL/L (ref 96–112)
CHLORIDE SERPL-SCNC: 98 MMOL/L (ref 96–112)
CHLORIDE SERPL-SCNC: 99 MMOL/L (ref 96–112)
CO2 BLDA-SCNC: 16 MMOL/L (ref 20–33)
CO2 BLDV-SCNC: 17 MMOL/L (ref 20–33)
CO2 BLDV-SCNC: 25 MMOL/L (ref 20–33)
CO2 SERPL-SCNC: 16 MMOL/L (ref 20–33)
CO2 SERPL-SCNC: 21 MMOL/L (ref 20–33)
CO2 SERPL-SCNC: 21 MMOL/L (ref 20–33)
CO2 SERPL-SCNC: 23 MMOL/L (ref 20–33)
CO2 SERPL-SCNC: 23 MMOL/L (ref 20–33)
CO2 SERPL-SCNC: 25 MMOL/L (ref 20–33)
CO2 SERPL-SCNC: 25 MMOL/L (ref 20–33)
COLOR UR: YELLOW
CREAT SERPL-MCNC: 2.14 MG/DL (ref 0.5–1.4)
CREAT SERPL-MCNC: 2.22 MG/DL (ref 0.5–1.4)
CREAT SERPL-MCNC: 2.24 MG/DL (ref 0.5–1.4)
CREAT SERPL-MCNC: 2.34 MG/DL (ref 0.5–1.4)
CREAT SERPL-MCNC: 2.42 MG/DL (ref 0.5–1.4)
CREAT SERPL-MCNC: 2.49 MG/DL (ref 0.5–1.4)
CREAT SERPL-MCNC: 2.54 MG/DL (ref 0.5–1.4)
CREAT UR-MCNC: 18 MG/DL
EKG IMPRESSION: NORMAL
EOSINOPHIL # BLD AUTO: 0.02 K/UL (ref 0–0.51)
EOSINOPHIL NFR BLD: 0.3 % (ref 0–6.9)
EPI CELLS #/AREA URNS HPF: NEGATIVE /HPF
ERYTHROCYTE [DISTWIDTH] IN BLOOD BY AUTOMATED COUNT: 52.8 FL (ref 35.9–50)
GFR SERPLBLD CREATININE-BSD FMLA CKD-EPI: 19 ML/MIN/1.73 M 2
GFR SERPLBLD CREATININE-BSD FMLA CKD-EPI: 20 ML/MIN/1.73 M 2
GFR SERPLBLD CREATININE-BSD FMLA CKD-EPI: 20 ML/MIN/1.73 M 2
GFR SERPLBLD CREATININE-BSD FMLA CKD-EPI: 21 ML/MIN/1.73 M 2
GFR SERPLBLD CREATININE-BSD FMLA CKD-EPI: 22 ML/MIN/1.73 M 2
GFR SERPLBLD CREATININE-BSD FMLA CKD-EPI: 23 ML/MIN/1.73 M 2
GFR SERPLBLD CREATININE-BSD FMLA CKD-EPI: 24 ML/MIN/1.73 M 2
GLOBULIN SER CALC-MCNC: 2.6 G/DL (ref 1.9–3.5)
GLUCOSE BLD STRIP.AUTO-MCNC: 257 MG/DL (ref 65–99)
GLUCOSE BLD STRIP.AUTO-MCNC: 259 MG/DL (ref 65–99)
GLUCOSE BLD STRIP.AUTO-MCNC: 275 MG/DL (ref 65–99)
GLUCOSE BLD STRIP.AUTO-MCNC: 344 MG/DL (ref 65–99)
GLUCOSE BLD STRIP.AUTO-MCNC: 372 MG/DL (ref 65–99)
GLUCOSE BLD STRIP.AUTO-MCNC: 396 MG/DL (ref 65–99)
GLUCOSE SERPL-MCNC: 192 MG/DL (ref 65–99)
GLUCOSE SERPL-MCNC: 252 MG/DL (ref 65–99)
GLUCOSE SERPL-MCNC: 254 MG/DL (ref 65–99)
GLUCOSE SERPL-MCNC: 260 MG/DL (ref 65–99)
GLUCOSE SERPL-MCNC: 301 MG/DL (ref 65–99)
GLUCOSE SERPL-MCNC: 326 MG/DL (ref 65–99)
GLUCOSE SERPL-MCNC: 420 MG/DL (ref 65–99)
GLUCOSE UR STRIP.AUTO-MCNC: 500 MG/DL
HCO3 BLDA-SCNC: 15.2 MMOL/L (ref 17–25)
HCO3 BLDV-SCNC: 16 MMOL/L (ref 24–28)
HCO3 BLDV-SCNC: 16.2 MMOL/L (ref 24–28)
HCO3 BLDV-SCNC: 23.9 MMOL/L (ref 24–28)
HCT VFR BLD AUTO: 37.4 % (ref 37–47)
HGB BLD-MCNC: 11.4 G/DL (ref 12–16)
HYALINE CASTS #/AREA URNS LPF: ABNORMAL /LPF
IMM GRANULOCYTES # BLD AUTO: 0.03 K/UL (ref 0–0.11)
IMM GRANULOCYTES NFR BLD AUTO: 0.5 % (ref 0–0.9)
INHALED O2 FLOW RATE: ABNORMAL L/MIN
KETONES UR STRIP.AUTO-MCNC: NEGATIVE MG/DL
LACTATE BLD-SCNC: 1.5 MMOL/L (ref 0.5–2)
LEUKOCYTE ESTERASE UR QL STRIP.AUTO: ABNORMAL
LYMPHOCYTES # BLD AUTO: 0.38 K/UL (ref 1–4.8)
LYMPHOCYTES NFR BLD: 6.5 % (ref 22–41)
MAGNESIUM SERPL-MCNC: 2 MG/DL (ref 1.5–2.5)
MAGNESIUM SERPL-MCNC: 2.1 MG/DL (ref 1.5–2.5)
MAGNESIUM SERPL-MCNC: 2.1 MG/DL (ref 1.5–2.5)
MCH RBC QN AUTO: 27.3 PG (ref 27–33)
MCHC RBC AUTO-ENTMCNC: 30.5 G/DL (ref 32.2–35.5)
MCV RBC AUTO: 89.5 FL (ref 81.4–97.8)
MICRO URNS: ABNORMAL
MICROALBUMIN UR-MCNC: <1.2 MG/DL
MICROALBUMIN/CREAT UR: NORMAL MG/G (ref 0–30)
MONOCYTES # BLD AUTO: 0.32 K/UL (ref 0–0.85)
MONOCYTES NFR BLD AUTO: 5.5 % (ref 0–13.4)
NEUTROPHILS # BLD AUTO: 5.07 K/UL (ref 1.82–7.42)
NEUTROPHILS NFR BLD: 87 % (ref 44–72)
NITRITE UR QL STRIP.AUTO: NEGATIVE
NRBC # BLD AUTO: 0 K/UL
NRBC BLD-RTO: 0 /100 WBC (ref 0–0.2)
PCO2 BLDA: 27 MMHG (ref 26–37)
PCO2 BLDV: 32.8 MMHG (ref 41–51)
PCO2 BLDV: 34 MMHG (ref 41–51)
PCO2 BLDV: 39.5 MMHG (ref 41–51)
PCO2 TEMP ADJ BLDV: 31.8 MMHG (ref 41–51)
PH BLDA: 7.36 [PH] (ref 7.4–7.5)
PH BLDV: 7.29 [PH] (ref 7.31–7.45)
PH BLDV: 7.31 [PH] (ref 7.31–7.45)
PH BLDV: 7.39 [PH] (ref 7.31–7.45)
PH TEMP ADJ BLDV: 7.32 [PH] (ref 7.31–7.45)
PH UR STRIP.AUTO: 6.5 [PH] (ref 5–8)
PHOSPHATE SERPL-MCNC: 3.6 MG/DL (ref 2.5–4.5)
PHOSPHATE SERPL-MCNC: 3.8 MG/DL (ref 2.5–4.5)
PHOSPHATE SERPL-MCNC: 4 MG/DL (ref 2.5–4.5)
PLATELET # BLD AUTO: 111 K/UL (ref 164–446)
PMV BLD AUTO: 12.8 FL (ref 9–12.9)
PO2 BLDA: 64 MMHG (ref 64–87)
PO2 BLDV: 36.4 MMHG (ref 25–40)
PO2 BLDV: 38 MMHG (ref 25–40)
PO2 BLDV: 39 MMHG (ref 25–40)
PO2 TEMP ADJ BLDV: 34.6 MMHG (ref 25–40)
POTASSIUM BLD-SCNC: 6.6 MMOL/L (ref 3.6–5.5)
POTASSIUM BLD-SCNC: 8 MMOL/L (ref 3.6–5.5)
POTASSIUM SERPL-SCNC: 5.2 MMOL/L (ref 3.6–5.5)
POTASSIUM SERPL-SCNC: 5.4 MMOL/L (ref 3.6–5.5)
POTASSIUM SERPL-SCNC: 5.5 MMOL/L (ref 3.6–5.5)
POTASSIUM SERPL-SCNC: 5.7 MMOL/L (ref 3.6–5.5)
POTASSIUM SERPL-SCNC: 6.2 MMOL/L (ref 3.6–5.5)
POTASSIUM SERPL-SCNC: 6.3 MMOL/L (ref 3.6–5.5)
POTASSIUM SERPL-SCNC: 8 MMOL/L (ref 3.6–5.5)
PROT SERPL-MCNC: 6.6 G/DL (ref 6–8.2)
PROT UR QL STRIP: NEGATIVE MG/DL
PTH-INTACT SERPL-MCNC: 89.2 PG/ML (ref 14–72)
RBC # BLD AUTO: 4.18 M/UL (ref 4.2–5.4)
RBC # URNS HPF: ABNORMAL /HPF
RBC UR QL AUTO: NEGATIVE
SAO2 % BLDA: 92 % (ref 93–99)
SAO2 % BLDV: 63.4 %
SAO2 % BLDV: 66 %
SAO2 % BLDV: 73 %
SODIUM BLD-SCNC: 132 MMOL/L (ref 135–145)
SODIUM BLD-SCNC: 136 MMOL/L (ref 135–145)
SODIUM SERPL-SCNC: 130 MMOL/L (ref 135–145)
SODIUM SERPL-SCNC: 134 MMOL/L (ref 135–145)
SODIUM SERPL-SCNC: 134 MMOL/L (ref 135–145)
SODIUM SERPL-SCNC: 136 MMOL/L (ref 135–145)
SODIUM SERPL-SCNC: 139 MMOL/L (ref 135–145)
SP GR UR STRIP.AUTO: 1.01
SPECIMEN DRAWN FROM PATIENT: ABNORMAL
TSH SERPL DL<=0.005 MIU/L-ACNC: 2.06 UIU/ML (ref 0.38–5.33)
UROBILINOGEN UR STRIP.AUTO-MCNC: 0.2 MG/DL
WBC # BLD AUTO: 5.8 K/UL (ref 4.8–10.8)
WBC #/AREA URNS HPF: ABNORMAL /HPF

## 2024-10-15 PROCEDURE — 700111 HCHG RX REV CODE 636 W/ 250 OVERRIDE (IP): Mod: JZ,UD | Performed by: EMERGENCY MEDICINE

## 2024-10-15 PROCEDURE — 82330 ASSAY OF CALCIUM: CPT

## 2024-10-15 PROCEDURE — 84443 ASSAY THYROID STIM HORMONE: CPT

## 2024-10-15 PROCEDURE — 80180 DRUG SCRN QUAN MYCOPHENOLATE: CPT

## 2024-10-15 PROCEDURE — 700105 HCHG RX REV CODE 258: Mod: UD | Performed by: INTERNAL MEDICINE

## 2024-10-15 PROCEDURE — 81001 URINALYSIS AUTO W/SCOPE: CPT

## 2024-10-15 PROCEDURE — 700102 HCHG RX REV CODE 250 W/ 637 OVERRIDE(OP): Performed by: HOSPITALIST

## 2024-10-15 PROCEDURE — 700111 HCHG RX REV CODE 636 W/ 250 OVERRIDE (IP): Performed by: INTERNAL MEDICINE

## 2024-10-15 PROCEDURE — 85025 COMPLETE CBC W/AUTO DIFF WBC: CPT

## 2024-10-15 PROCEDURE — 700101 HCHG RX REV CODE 250: Mod: UD | Performed by: INTERNAL MEDICINE

## 2024-10-15 PROCEDURE — 94640 AIRWAY INHALATION TREATMENT: CPT

## 2024-10-15 PROCEDURE — 83735 ASSAY OF MAGNESIUM: CPT

## 2024-10-15 PROCEDURE — 700101 HCHG RX REV CODE 250: Mod: UD | Performed by: EMERGENCY MEDICINE

## 2024-10-15 PROCEDURE — 71045 X-RAY EXAM CHEST 1 VIEW: CPT

## 2024-10-15 PROCEDURE — 84295 ASSAY OF SERUM SODIUM: CPT

## 2024-10-15 PROCEDURE — 80197 ASSAY OF TACROLIMUS: CPT

## 2024-10-15 PROCEDURE — 83605 ASSAY OF LACTIC ACID: CPT

## 2024-10-15 PROCEDURE — 80048 BASIC METABOLIC PNL TOTAL CA: CPT

## 2024-10-15 PROCEDURE — 700111 HCHG RX REV CODE 636 W/ 250 OVERRIDE (IP): Mod: JZ | Performed by: INTERNAL MEDICINE

## 2024-10-15 PROCEDURE — 36600 WITHDRAWAL OF ARTERIAL BLOOD: CPT

## 2024-10-15 PROCEDURE — 36415 COLL VENOUS BLD VENIPUNCTURE: CPT

## 2024-10-15 PROCEDURE — 80053 COMPREHEN METABOLIC PANEL: CPT

## 2024-10-15 PROCEDURE — 99223 1ST HOSP IP/OBS HIGH 75: CPT | Performed by: INTERNAL MEDICINE

## 2024-10-15 PROCEDURE — 76776 US EXAM K TRANSPL W/DOPPLER: CPT

## 2024-10-15 PROCEDURE — A9270 NON-COVERED ITEM OR SERVICE: HCPCS | Performed by: INTERNAL MEDICINE

## 2024-10-15 PROCEDURE — 82043 UR ALBUMIN QUANTITATIVE: CPT

## 2024-10-15 PROCEDURE — 82570 ASSAY OF URINE CREATININE: CPT

## 2024-10-15 PROCEDURE — 96365 THER/PROPH/DIAG IV INF INIT: CPT

## 2024-10-15 PROCEDURE — 96367 TX/PROPH/DG ADDL SEQ IV INF: CPT

## 2024-10-15 PROCEDURE — 83970 ASSAY OF PARATHORMONE: CPT

## 2024-10-15 PROCEDURE — 82962 GLUCOSE BLOOD TEST: CPT | Mod: 91

## 2024-10-15 PROCEDURE — 99222 1ST HOSP IP/OBS MODERATE 55: CPT | Performed by: HOSPITALIST

## 2024-10-15 PROCEDURE — 82803 BLOOD GASES ANY COMBINATION: CPT

## 2024-10-15 PROCEDURE — 770020 HCHG ROOM/CARE - TELE (206)

## 2024-10-15 PROCEDURE — 700102 HCHG RX REV CODE 250 W/ 637 OVERRIDE(OP): Performed by: INTERNAL MEDICINE

## 2024-10-15 PROCEDURE — 96376 TX/PRO/DX INJ SAME DRUG ADON: CPT

## 2024-10-15 PROCEDURE — 99291 CRITICAL CARE FIRST HOUR: CPT | Performed by: INTERNAL MEDICINE

## 2024-10-15 PROCEDURE — 96375 TX/PRO/DX INJ NEW DRUG ADDON: CPT

## 2024-10-15 PROCEDURE — 82306 VITAMIN D 25 HYDROXY: CPT

## 2024-10-15 PROCEDURE — 84132 ASSAY OF SERUM POTASSIUM: CPT

## 2024-10-15 PROCEDURE — 84100 ASSAY OF PHOSPHORUS: CPT

## 2024-10-15 RX ORDER — INSULIN LISPRO 100 [IU]/ML
2-9 INJECTION, SOLUTION INTRAVENOUS; SUBCUTANEOUS
Status: DISCONTINUED | OUTPATIENT
Start: 2024-10-15 | End: 2024-10-16 | Stop reason: HOSPADM

## 2024-10-15 RX ORDER — IPRATROPIUM BROMIDE AND ALBUTEROL SULFATE 2.5; .5 MG/3ML; MG/3ML
3 SOLUTION RESPIRATORY (INHALATION)
Status: DISCONTINUED | OUTPATIENT
Start: 2024-10-15 | End: 2024-10-16 | Stop reason: HOSPADM

## 2024-10-15 RX ORDER — FUROSEMIDE 10 MG/ML
40 INJECTION INTRAMUSCULAR; INTRAVENOUS ONCE
Status: COMPLETED | OUTPATIENT
Start: 2024-10-15 | End: 2024-10-15

## 2024-10-15 RX ORDER — DEXTROSE MONOHYDRATE 25 G/50ML
25 INJECTION, SOLUTION INTRAVENOUS
Status: DISCONTINUED | OUTPATIENT
Start: 2024-10-15 | End: 2024-10-16 | Stop reason: HOSPADM

## 2024-10-15 RX ORDER — TACROLIMUS 1 MG/1
2 CAPSULE ORAL EVERY EVENING
Status: DISCONTINUED | OUTPATIENT
Start: 2024-10-15 | End: 2024-10-16 | Stop reason: HOSPADM

## 2024-10-15 RX ORDER — IPRATROPIUM BROMIDE AND ALBUTEROL SULFATE 2.5; .5 MG/3ML; MG/3ML
3 SOLUTION RESPIRATORY (INHALATION)
Status: DISCONTINUED | OUTPATIENT
Start: 2024-10-15 | End: 2024-10-15

## 2024-10-15 RX ORDER — LEVOTHYROXINE SODIUM 88 UG/1
88 TABLET ORAL
Status: DISCONTINUED | OUTPATIENT
Start: 2024-10-15 | End: 2024-10-16 | Stop reason: HOSPADM

## 2024-10-15 RX ORDER — DEXTROSE MONOHYDRATE 25 G/50ML
25 INJECTION, SOLUTION INTRAVENOUS ONCE
Status: COMPLETED | OUTPATIENT
Start: 2024-10-15 | End: 2024-10-15

## 2024-10-15 RX ORDER — MYCOPHENOLATE MOFETIL 250 MG/1
250 CAPSULE ORAL 2 TIMES DAILY
Status: DISCONTINUED | OUTPATIENT
Start: 2024-10-15 | End: 2024-10-16 | Stop reason: HOSPADM

## 2024-10-15 RX ORDER — ALBUTEROL SULFATE 5 MG/ML
10 SOLUTION RESPIRATORY (INHALATION) ONCE
Status: COMPLETED | OUTPATIENT
Start: 2024-10-15 | End: 2024-10-15

## 2024-10-15 RX ORDER — HEPARIN SODIUM 5000 [USP'U]/ML
5000 INJECTION, SOLUTION INTRAVENOUS; SUBCUTANEOUS EVERY 8 HOURS
Status: DISCONTINUED | OUTPATIENT
Start: 2024-10-15 | End: 2024-10-15

## 2024-10-15 RX ORDER — FUROSEMIDE 10 MG/ML
40 INJECTION INTRAMUSCULAR; INTRAVENOUS EVERY 6 HOURS
Status: DISCONTINUED | OUTPATIENT
Start: 2024-10-15 | End: 2024-10-15

## 2024-10-15 RX ORDER — TACROLIMUS 1 MG/1
1 CAPSULE ORAL DAILY
Status: DISCONTINUED | OUTPATIENT
Start: 2024-10-15 | End: 2024-10-16 | Stop reason: HOSPADM

## 2024-10-15 RX ORDER — ALBUTEROL SULFATE 90 UG/1
2 INHALANT RESPIRATORY (INHALATION)
Status: DISCONTINUED | OUTPATIENT
Start: 2024-10-15 | End: 2024-10-15

## 2024-10-15 RX ORDER — CALCIUM CHLORIDE 100 MG/ML
1 INJECTION INTRAVENOUS; INTRAVENTRICULAR ONCE
Status: COMPLETED | OUTPATIENT
Start: 2024-10-15 | End: 2024-10-15

## 2024-10-15 RX ORDER — FUROSEMIDE 10 MG/ML
40 INJECTION INTRAMUSCULAR; INTRAVENOUS EVERY 6 HOURS
Status: DISCONTINUED | OUTPATIENT
Start: 2024-10-15 | End: 2024-10-16 | Stop reason: HOSPADM

## 2024-10-15 RX ORDER — PREDNISONE 5 MG/1
5 TABLET ORAL DAILY
Status: DISCONTINUED | OUTPATIENT
Start: 2024-10-15 | End: 2024-10-16 | Stop reason: HOSPADM

## 2024-10-15 RX ORDER — FUROSEMIDE 10 MG/ML
20 INJECTION INTRAMUSCULAR; INTRAVENOUS ONCE
Status: COMPLETED | OUTPATIENT
Start: 2024-10-15 | End: 2024-10-15

## 2024-10-15 RX ORDER — SODIUM BICARBONATE IN D5W 150/1000ML
PLASTIC BAG, INJECTION (ML) INTRAVENOUS CONTINUOUS
Status: DISCONTINUED | OUTPATIENT
Start: 2024-10-15 | End: 2024-10-15

## 2024-10-15 RX ORDER — CALCIUM GLUCONATE 20 MG/ML
2 INJECTION, SOLUTION INTRAVENOUS ONCE
Status: COMPLETED | OUTPATIENT
Start: 2024-10-15 | End: 2024-10-15

## 2024-10-15 RX ORDER — SODIUM CHLORIDE 9 MG/ML
INJECTION, SOLUTION INTRAVENOUS CONTINUOUS
Status: DISCONTINUED | OUTPATIENT
Start: 2024-10-15 | End: 2024-10-15

## 2024-10-15 RX ORDER — SODIUM BICARBONATE 650 MG/1
650 TABLET ORAL 2 TIMES DAILY
Status: DISCONTINUED | OUTPATIENT
Start: 2024-10-15 | End: 2024-10-16 | Stop reason: HOSPADM

## 2024-10-15 RX ORDER — ACETAMINOPHEN 500 MG
500-1000 TABLET ORAL EVERY 6 HOURS PRN
COMMUNITY

## 2024-10-15 RX ADMIN — CALCIUM CHLORIDE 1 G: 100 INJECTION, SOLUTION INTRAVENOUS at 01:23

## 2024-10-15 RX ADMIN — DEXTROSE MONOHYDRATE 25 G: 25 INJECTION, SOLUTION INTRAVENOUS at 01:35

## 2024-10-15 RX ADMIN — INSULIN LISPRO 5 UNITS: 100 INJECTION, SOLUTION INTRAVENOUS; SUBCUTANEOUS at 16:53

## 2024-10-15 RX ADMIN — FUROSEMIDE 40 MG: 10 INJECTION, SOLUTION INTRAVENOUS at 17:12

## 2024-10-15 RX ADMIN — INSULIN HUMAN 10 UNITS: 100 INJECTION, SOLUTION PARENTERAL at 03:43

## 2024-10-15 RX ADMIN — FUROSEMIDE 40 MG: 10 INJECTION, SOLUTION INTRAVENOUS at 23:51

## 2024-10-15 RX ADMIN — SODIUM BICARBONATE 650 MG: 650 TABLET ORAL at 10:39

## 2024-10-15 RX ADMIN — FUROSEMIDE 40 MG: 10 INJECTION, SOLUTION INTRAVENOUS at 11:36

## 2024-10-15 RX ADMIN — FUROSEMIDE 40 MG: 10 INJECTION, SOLUTION INTRAVENOUS at 05:24

## 2024-10-15 RX ADMIN — Medication 5 UNITS: at 01:35

## 2024-10-15 RX ADMIN — INSULIN LISPRO 3 UNITS: 100 INJECTION, SOLUTION INTRAVENOUS; SUBCUTANEOUS at 22:34

## 2024-10-15 RX ADMIN — DEXTROSE MONOHYDRATE 25 G: 25 INJECTION, SOLUTION INTRAVENOUS at 03:41

## 2024-10-15 RX ADMIN — INSULIN GLARGINE-YFGN 10 UNITS: 100 INJECTION, SOLUTION SUBCUTANEOUS at 22:34

## 2024-10-15 RX ADMIN — INSULIN LISPRO 8 UNITS: 100 INJECTION, SOLUTION INTRAVENOUS; SUBCUTANEOUS at 10:39

## 2024-10-15 RX ADMIN — MYCOPHENOLATE MOFETIL 250 MG: 250 CAPSULE ORAL at 17:11

## 2024-10-15 RX ADMIN — FUROSEMIDE 40 MG: 10 INJECTION, SOLUTION INTRAVENOUS at 01:40

## 2024-10-15 RX ADMIN — HEPARIN SODIUM 5000 UNITS: 5000 INJECTION, SOLUTION INTRAVENOUS; SUBCUTANEOUS at 05:24

## 2024-10-15 RX ADMIN — TACROLIMUS 2 MG: 1 CAPSULE ORAL at 17:11

## 2024-10-15 RX ADMIN — SODIUM BICARBONATE 650 MG: 650 TABLET ORAL at 17:11

## 2024-10-15 RX ADMIN — FUROSEMIDE 20 MG: 10 INJECTION, SOLUTION INTRAVENOUS at 02:55

## 2024-10-15 RX ADMIN — APIXABAN 2.5 MG: 5 TABLET, FILM COATED ORAL at 17:10

## 2024-10-15 RX ADMIN — PREDNISONE 5 MG: 5 TABLET ORAL at 10:39

## 2024-10-15 RX ADMIN — SODIUM BICARBONATE 50 MEQ: 84 INJECTION INTRAVENOUS at 01:35

## 2024-10-15 RX ADMIN — SODIUM BICARBONATE 100 MEQ: 84 INJECTION INTRAVENOUS at 02:55

## 2024-10-15 RX ADMIN — ALBUTEROL SULFATE 10 MG: 2.5 SOLUTION RESPIRATORY (INHALATION) at 03:23

## 2024-10-15 RX ADMIN — LEVOTHYROXINE SODIUM 88 MCG: 0.09 TABLET ORAL at 10:38

## 2024-10-15 RX ADMIN — SODIUM BICARBONATE: 84 INJECTION, SOLUTION INTRAVENOUS at 03:47

## 2024-10-15 RX ADMIN — MYCOPHENOLATE MOFETIL 250 MG: 250 CAPSULE ORAL at 10:38

## 2024-10-15 RX ADMIN — CALCIUM GLUCONATE 2 G: 20 INJECTION, SOLUTION INTRAVENOUS at 01:08

## 2024-10-15 SDOH — ECONOMIC STABILITY: TRANSPORTATION INSECURITY
IN THE PAST 12 MONTHS, HAS THE LACK OF TRANSPORTATION KEPT YOU FROM MEDICAL APPOINTMENTS OR FROM GETTING MEDICATIONS?: NO

## 2024-10-15 SDOH — ECONOMIC STABILITY: TRANSPORTATION INSECURITY
IN THE PAST 12 MONTHS, HAS LACK OF RELIABLE TRANSPORTATION KEPT YOU FROM MEDICAL APPOINTMENTS, MEETINGS, WORK OR FROM GETTING THINGS NEEDED FOR DAILY LIVING?: NO

## 2024-10-15 ASSESSMENT — ENCOUNTER SYMPTOMS
PSYCHIATRIC NEGATIVE: 1
MUSCULOSKELETAL NEGATIVE: 1
PALPITATIONS: 0
HEADACHES: 0
SPEECH CHANGE: 0
VOMITING: 0
ABDOMINAL PAIN: 0
DIZZINESS: 0
DIZZINESS: 1
DIARRHEA: 0
NAUSEA: 0
SHORTNESS OF BREATH: 0
FEVER: 0
NERVOUS/ANXIOUS: 0

## 2024-10-15 ASSESSMENT — COGNITIVE AND FUNCTIONAL STATUS - GENERAL
MOVING TO AND FROM BED TO CHAIR: A LITTLE
SUGGESTED CMS G CODE MODIFIER DAILY ACTIVITY: CI
DAILY ACTIVITIY SCORE: 23
MOBILITY SCORE: 21
CLIMB 3 TO 5 STEPS WITH RAILING: A LITTLE
TOILETING: A LITTLE
WALKING IN HOSPITAL ROOM: A LITTLE
SUGGESTED CMS G CODE MODIFIER MOBILITY: CJ

## 2024-10-15 ASSESSMENT — CHA2DS2 SCORE
SEX: FEMALE
CHA2DS2 VASC SCORE: 6
DIABETES: YES
HYPERTENSION: YES
AGE 65 TO 74: YES
VASCULAR DISEASE: YES
PRIOR STROKE OR TIA OR THROMBOEMBOLISM: NO
AGE 75 OR GREATER: NO
CHF OR LEFT VENTRICULAR DYSFUNCTION: YES

## 2024-10-15 ASSESSMENT — LIFESTYLE VARIABLES
HAVE YOU EVER FELT YOU SHOULD CUT DOWN ON YOUR DRINKING: NO
EVER FELT BAD OR GUILTY ABOUT YOUR DRINKING: NO
HAVE PEOPLE ANNOYED YOU BY CRITICIZING YOUR DRINKING: NO
HOW MANY TIMES IN THE PAST YEAR HAVE YOU HAD 5 OR MORE DRINKS IN A DAY: 0
ALCOHOL_USE: NO
TOTAL SCORE: 0
TOTAL SCORE: 0
CONSUMPTION TOTAL: NEGATIVE
TOTAL SCORE: 0
AVERAGE NUMBER OF DAYS PER WEEK YOU HAVE A DRINK CONTAINING ALCOHOL: 0
EVER HAD A DRINK FIRST THING IN THE MORNING TO STEADY YOUR NERVES TO GET RID OF A HANGOVER: NO
ON A TYPICAL DAY WHEN YOU DRINK ALCOHOL HOW MANY DRINKS DO YOU HAVE: 0

## 2024-10-15 ASSESSMENT — SOCIAL DETERMINANTS OF HEALTH (SDOH)
WITHIN THE LAST YEAR, HAVE YOU BEEN KICKED, HIT, SLAPPED, OR OTHERWISE PHYSICALLY HURT BY YOUR PARTNER OR EX-PARTNER?: NO
WITHIN THE LAST YEAR, HAVE TO BEEN RAPED OR FORCED TO HAVE ANY KIND OF SEXUAL ACTIVITY BY YOUR PARTNER OR EX-PARTNER?: NO
WITHIN THE PAST 12 MONTHS, YOU WORRIED THAT YOUR FOOD WOULD RUN OUT BEFORE YOU GOT THE MONEY TO BUY MORE: NEVER TRUE
WITHIN THE LAST YEAR, HAVE YOU BEEN HUMILIATED OR EMOTIONALLY ABUSED IN OTHER WAYS BY YOUR PARTNER OR EX-PARTNER?: NO
WITHIN THE LAST YEAR, HAVE YOU BEEN AFRAID OF YOUR PARTNER OR EX-PARTNER?: NO
IN THE PAST 12 MONTHS, HAS THE ELECTRIC, GAS, OIL, OR WATER COMPANY THREATENED TO SHUT OFF SERVICE IN YOUR HOME?: NO
WITHIN THE PAST 12 MONTHS, THE FOOD YOU BOUGHT JUST DIDN'T LAST AND YOU DIDN'T HAVE MONEY TO GET MORE: NEVER TRUE

## 2024-10-15 ASSESSMENT — PAIN DESCRIPTION - PAIN TYPE
TYPE: ACUTE PAIN

## 2024-10-15 ASSESSMENT — FIBROSIS 4 INDEX
FIB4 SCORE: 4.03
FIB4 SCORE: 4.03

## 2024-10-16 VITALS
DIASTOLIC BLOOD PRESSURE: 53 MMHG | TEMPERATURE: 97.2 F | BODY MASS INDEX: 22.48 KG/M2 | OXYGEN SATURATION: 95 % | WEIGHT: 122.14 LBS | SYSTOLIC BLOOD PRESSURE: 129 MMHG | HEIGHT: 62 IN | HEART RATE: 62 BPM | RESPIRATION RATE: 17 BRPM

## 2024-10-16 LAB
ANION GAP SERPL CALC-SCNC: 11 MMOL/L (ref 7–16)
ANION GAP SERPL CALC-SCNC: 13 MMOL/L (ref 7–16)
ANION GAP SERPL CALC-SCNC: 14 MMOL/L (ref 7–16)
BUN SERPL-MCNC: 48 MG/DL (ref 8–22)
BUN SERPL-MCNC: 49 MG/DL (ref 8–22)
BUN SERPL-MCNC: 49 MG/DL (ref 8–22)
CALCIUM SERPL-MCNC: 10 MG/DL (ref 8.5–10.5)
CALCIUM SERPL-MCNC: 9.9 MG/DL (ref 8.5–10.5)
CALCIUM SERPL-MCNC: 9.9 MG/DL (ref 8.5–10.5)
CHLORIDE SERPL-SCNC: 100 MMOL/L (ref 96–112)
CHLORIDE SERPL-SCNC: 100 MMOL/L (ref 96–112)
CHLORIDE SERPL-SCNC: 99 MMOL/L (ref 96–112)
CO2 SERPL-SCNC: 24 MMOL/L (ref 20–33)
CO2 SERPL-SCNC: 25 MMOL/L (ref 20–33)
CO2 SERPL-SCNC: 25 MMOL/L (ref 20–33)
CREAT SERPL-MCNC: 2.26 MG/DL (ref 0.5–1.4)
CREAT SERPL-MCNC: 2.26 MG/DL (ref 0.5–1.4)
CREAT SERPL-MCNC: 2.28 MG/DL (ref 0.5–1.4)
ERYTHROCYTE [DISTWIDTH] IN BLOOD BY AUTOMATED COUNT: 51.6 FL (ref 35.9–50)
GFR SERPLBLD CREATININE-BSD FMLA CKD-EPI: 22 ML/MIN/1.73 M 2
GLUCOSE BLD STRIP.AUTO-MCNC: 169 MG/DL (ref 65–99)
GLUCOSE BLD STRIP.AUTO-MCNC: 207 MG/DL (ref 65–99)
GLUCOSE SERPL-MCNC: 122 MG/DL (ref 65–99)
GLUCOSE SERPL-MCNC: 145 MG/DL (ref 65–99)
GLUCOSE SERPL-MCNC: 166 MG/DL (ref 65–99)
HCT VFR BLD AUTO: 37.1 % (ref 37–47)
HGB BLD-MCNC: 11.7 G/DL (ref 12–16)
MCH RBC QN AUTO: 27.7 PG (ref 27–33)
MCHC RBC AUTO-ENTMCNC: 31.5 G/DL (ref 32.2–35.5)
MCV RBC AUTO: 87.9 FL (ref 81.4–97.8)
MYCOPHENOLATE SERPL LC/MS/MS-MCNC: 2.6 UG/ML (ref 1–3.5)
MYCOPHENOLATE-G SERPL LC/MS/MS-MCNC: 59.7 UG/ML (ref 35–100)
PLATELET # BLD AUTO: 90 K/UL (ref 164–446)
PLATELETS.RETICULATED NFR BLD AUTO: 4.5 % (ref 0.6–13.1)
PMV BLD AUTO: 12.7 FL (ref 9–12.9)
POTASSIUM SERPL-SCNC: 5.2 MMOL/L (ref 3.6–5.5)
POTASSIUM SERPL-SCNC: 5.3 MMOL/L (ref 3.6–5.5)
POTASSIUM SERPL-SCNC: 5.8 MMOL/L (ref 3.6–5.5)
RBC # BLD AUTO: 4.22 M/UL (ref 4.2–5.4)
SODIUM SERPL-SCNC: 136 MMOL/L (ref 135–145)
SODIUM SERPL-SCNC: 137 MMOL/L (ref 135–145)
SODIUM SERPL-SCNC: 138 MMOL/L (ref 135–145)
TACROLIMUS BLD-MCNC: 10.5 NG/ML (ref 5–20)
TACROLIMUS BLD-MCNC: 7.8 NG/ML (ref 5–20)
WBC # BLD AUTO: 4 K/UL (ref 4.8–10.8)

## 2024-10-16 PROCEDURE — 85055 RETICULATED PLATELET ASSAY: CPT

## 2024-10-16 PROCEDURE — 85027 COMPLETE CBC AUTOMATED: CPT

## 2024-10-16 PROCEDURE — 80197 ASSAY OF TACROLIMUS: CPT

## 2024-10-16 PROCEDURE — 700111 HCHG RX REV CODE 636 W/ 250 OVERRIDE (IP): Mod: JZ | Performed by: INTERNAL MEDICINE

## 2024-10-16 PROCEDURE — 80048 BASIC METABOLIC PNL TOTAL CA: CPT

## 2024-10-16 PROCEDURE — 82962 GLUCOSE BLOOD TEST: CPT

## 2024-10-16 PROCEDURE — A9270 NON-COVERED ITEM OR SERVICE: HCPCS | Performed by: INTERNAL MEDICINE

## 2024-10-16 PROCEDURE — 99239 HOSP IP/OBS DSCHRG MGMT >30: CPT | Mod: GC | Performed by: STUDENT IN AN ORGANIZED HEALTH CARE EDUCATION/TRAINING PROGRAM

## 2024-10-16 PROCEDURE — 36415 COLL VENOUS BLD VENIPUNCTURE: CPT

## 2024-10-16 PROCEDURE — 99232 SBSQ HOSP IP/OBS MODERATE 35: CPT | Performed by: INTERNAL MEDICINE

## 2024-10-16 PROCEDURE — 700102 HCHG RX REV CODE 250 W/ 637 OVERRIDE(OP): Performed by: INTERNAL MEDICINE

## 2024-10-16 PROCEDURE — 700111 HCHG RX REV CODE 636 W/ 250 OVERRIDE (IP): Performed by: INTERNAL MEDICINE

## 2024-10-16 RX ORDER — TACROLIMUS 1 MG/1
1-2 CAPSULE ORAL 2 TIMES DAILY
Qty: 30 CAPSULE | Refills: 0
Start: 2024-10-16

## 2024-10-16 RX ADMIN — SODIUM BICARBONATE 650 MG: 650 TABLET ORAL at 06:25

## 2024-10-16 RX ADMIN — FUROSEMIDE 40 MG: 10 INJECTION, SOLUTION INTRAVENOUS at 06:25

## 2024-10-16 RX ADMIN — INSULIN LISPRO 2 UNITS: 100 INJECTION, SOLUTION INTRAVENOUS; SUBCUTANEOUS at 09:24

## 2024-10-16 RX ADMIN — INSULIN GLARGINE-YFGN 10 UNITS: 100 INJECTION, SOLUTION SUBCUTANEOUS at 09:24

## 2024-10-16 RX ADMIN — MYCOPHENOLATE MOFETIL 250 MG: 250 CAPSULE ORAL at 07:20

## 2024-10-16 RX ADMIN — LEVOTHYROXINE SODIUM 88 MCG: 0.09 TABLET ORAL at 06:25

## 2024-10-16 RX ADMIN — APIXABAN 2.5 MG: 5 TABLET, FILM COATED ORAL at 06:25

## 2024-10-16 RX ADMIN — TACROLIMUS 1 MG: 1 CAPSULE ORAL at 06:25

## 2024-10-16 RX ADMIN — PREDNISONE 5 MG: 5 TABLET ORAL at 06:25

## 2024-10-16 ASSESSMENT — ENCOUNTER SYMPTOMS
NAUSEA: 0
CHILLS: 0
COUGH: 0
FEVER: 0
SHORTNESS OF BREATH: 0
VOMITING: 0

## 2024-10-16 ASSESSMENT — FIBROSIS 4 INDEX: FIB4 SCORE: 4.97

## 2024-10-17 ENCOUNTER — PATIENT OUTREACH (OUTPATIENT)
Dept: MEDICAL GROUP | Facility: MEDICAL CENTER | Age: 74
End: 2024-10-17
Payer: MEDICARE

## 2024-10-17 DIAGNOSIS — N18.32 STAGE 3B CHRONIC KIDNEY DISEASE: ICD-10-CM

## 2024-10-21 ENCOUNTER — HOSPITAL ENCOUNTER (OUTPATIENT)
Dept: LAB | Facility: MEDICAL CENTER | Age: 74
End: 2024-10-21
Attending: INTERNAL MEDICINE
Payer: MEDICARE

## 2024-10-21 LAB
ALBUMIN SERPL BCP-MCNC: 4 G/DL (ref 3.2–4.9)
ALBUMIN/GLOB SERPL: 1.6 G/DL
ALP SERPL-CCNC: 98 U/L (ref 30–99)
ALT SERPL-CCNC: 37 U/L (ref 2–50)
ANION GAP SERPL CALC-SCNC: 9 MMOL/L (ref 7–16)
APPEARANCE UR: CLEAR
AST SERPL-CCNC: 17 U/L (ref 12–45)
BACTERIA #/AREA URNS HPF: ABNORMAL /HPF
BASOPHILS # BLD AUTO: 0.3 % (ref 0–1.8)
BASOPHILS # BLD: 0.01 K/UL (ref 0–0.12)
BILIRUB SERPL-MCNC: 0.4 MG/DL (ref 0.1–1.5)
BILIRUB UR QL STRIP.AUTO: NEGATIVE
BUN SERPL-MCNC: 51 MG/DL (ref 8–22)
CALCIUM ALBUM COR SERPL-MCNC: 10.4 MG/DL (ref 8.5–10.5)
CALCIUM SERPL-MCNC: 10.4 MG/DL (ref 8.4–10.2)
CHLORIDE SERPL-SCNC: 105 MMOL/L (ref 96–112)
CO2 SERPL-SCNC: 22 MMOL/L (ref 20–33)
COLOR UR: YELLOW
CREAT SERPL-MCNC: 2.26 MG/DL (ref 0.5–1.4)
EOSINOPHIL # BLD AUTO: 0.04 K/UL (ref 0–0.51)
EOSINOPHIL NFR BLD: 1.1 % (ref 0–6.9)
EPI CELLS #/AREA URNS HPF: NEGATIVE /HPF
ERYTHROCYTE [DISTWIDTH] IN BLOOD BY AUTOMATED COUNT: 52 FL (ref 35.9–50)
GFR SERPLBLD CREATININE-BSD FMLA CKD-EPI: 22 ML/MIN/1.73 M 2
GLOBULIN SER CALC-MCNC: 2.5 G/DL (ref 1.9–3.5)
GLUCOSE SERPL-MCNC: 202 MG/DL (ref 65–99)
GLUCOSE UR STRIP.AUTO-MCNC: NEGATIVE MG/DL
HCT VFR BLD AUTO: 41 % (ref 37–47)
HGB BLD-MCNC: 12.5 G/DL (ref 12–16)
HYPOCHROMIA BLD QL SMEAR: ABNORMAL
IMM GRANULOCYTES # BLD AUTO: 0.01 K/UL (ref 0–0.11)
IMM GRANULOCYTES NFR BLD AUTO: 0.3 % (ref 0–0.9)
KETONES UR STRIP.AUTO-MCNC: NEGATIVE MG/DL
LEUKOCYTE ESTERASE UR QL STRIP.AUTO: ABNORMAL
LG PLATELETS BLD QL SMEAR: NORMAL
LYMPHOCYTES # BLD AUTO: 0.45 K/UL (ref 1–4.8)
LYMPHOCYTES NFR BLD: 12.2 % (ref 22–41)
MCH RBC QN AUTO: 27.3 PG (ref 27–33)
MCHC RBC AUTO-ENTMCNC: 30.5 G/DL (ref 32.2–35.5)
MCV RBC AUTO: 89.5 FL (ref 81.4–97.8)
MICRO URNS: ABNORMAL
MONOCYTES # BLD AUTO: 0.31 K/UL (ref 0–0.85)
MONOCYTES NFR BLD AUTO: 8.4 % (ref 0–13.4)
NEUTROPHILS # BLD AUTO: 2.88 K/UL (ref 1.82–7.42)
NEUTROPHILS NFR BLD: 77.7 % (ref 44–72)
NITRITE UR QL STRIP.AUTO: NEGATIVE
NRBC # BLD AUTO: 0 K/UL
NRBC BLD-RTO: 0 /100 WBC (ref 0–0.2)
OVALOCYTES BLD QL SMEAR: NORMAL
PH UR STRIP.AUTO: 7.5 [PH] (ref 5–8)
PLATELET # BLD AUTO: 88 K/UL (ref 164–446)
PLATELET BLD QL SMEAR: NORMAL
PLATELETS.RETICULATED NFR BLD AUTO: 7.6 % (ref 0.6–13.1)
PMV BLD AUTO: 12 FL (ref 9–12.9)
POTASSIUM SERPL-SCNC: 5.7 MMOL/L (ref 3.6–5.5)
PROT SERPL-MCNC: 6.5 G/DL (ref 6–8.2)
PROT UR QL STRIP: NEGATIVE MG/DL
RBC # BLD AUTO: 4.58 M/UL (ref 4.2–5.4)
RBC # URNS HPF: ABNORMAL /HPF
RBC BLD AUTO: PRESENT
RBC UR QL AUTO: NEGATIVE
SODIUM SERPL-SCNC: 136 MMOL/L (ref 135–145)
SP GR UR STRIP.AUTO: 1.01
TACROLIMUS BLD-MCNC: 3.4 NG/ML (ref 5–20)
WBC # BLD AUTO: 3.7 K/UL (ref 4.8–10.8)
WBC #/AREA URNS HPF: ABNORMAL /HPF

## 2024-10-21 PROCEDURE — 87799 DETECT AGENT NOS DNA QUANT: CPT | Mod: 91

## 2024-10-21 PROCEDURE — 85055 RETICULATED PLATELET ASSAY: CPT

## 2024-10-21 PROCEDURE — 80197 ASSAY OF TACROLIMUS: CPT

## 2024-10-21 PROCEDURE — 87186 SC STD MICRODIL/AGAR DIL: CPT

## 2024-10-21 PROCEDURE — 80053 COMPREHEN METABOLIC PANEL: CPT

## 2024-10-21 PROCEDURE — 87077 CULTURE AEROBIC IDENTIFY: CPT

## 2024-10-21 PROCEDURE — 87086 URINE CULTURE/COLONY COUNT: CPT

## 2024-10-21 PROCEDURE — 36415 COLL VENOUS BLD VENIPUNCTURE: CPT

## 2024-10-21 PROCEDURE — 81001 URINALYSIS AUTO W/SCOPE: CPT

## 2024-10-21 PROCEDURE — 85025 COMPLETE CBC W/AUTO DIFF WBC: CPT

## 2024-10-22 LAB
BK PLASMA INTERP, QNT NAAT NL11711: DETECTED
BK PLASMA IU/ML, QNT NAAT NL11709: 76 IU/ML
BK PLASMA LOG IU/ML, QNT NAAT NL11710: 1.88 LOG IU/ML
BK UR INTERP, QNT NAAT NL11708: DETECTED
BK UR IU/ML, QNT NAAT NL11706: ABNORMAL IU/ML
BK UR LOG IU/ML, QNT NAAT NL11707: 4.86 LOG IU/ML

## 2024-10-23 ENCOUNTER — TELEPHONE (OUTPATIENT)
Dept: HEALTH INFORMATION MANAGEMENT | Facility: OTHER | Age: 74
End: 2024-10-23
Payer: MEDICARE

## 2024-10-24 ENCOUNTER — APPOINTMENT (OUTPATIENT)
Dept: MEDICAL GROUP | Facility: MEDICAL CENTER | Age: 74
End: 2024-10-24
Payer: MEDICARE

## 2024-11-11 ENCOUNTER — HOSPITAL ENCOUNTER (OUTPATIENT)
Dept: LAB | Facility: MEDICAL CENTER | Age: 74
End: 2024-11-11
Payer: MEDICARE

## 2024-11-11 DIAGNOSIS — Z79.01 CHRONIC ANTICOAGULATION: ICD-10-CM

## 2024-11-11 DIAGNOSIS — I48.0 PAROXYSMAL ATRIAL FIBRILLATION (HCC): ICD-10-CM

## 2024-11-11 DIAGNOSIS — D68.69 SECONDARY HYPERCOAGULABLE STATE (HCC): ICD-10-CM

## 2024-11-11 LAB
ALBUMIN SERPL BCP-MCNC: 3.9 G/DL (ref 3.2–4.9)
ALBUMIN/GLOB SERPL: 1.5 G/DL
ALP SERPL-CCNC: 81 U/L (ref 30–99)
ALT SERPL-CCNC: 15 U/L (ref 2–50)
ANION GAP SERPL CALC-SCNC: 9 MMOL/L (ref 7–16)
AST SERPL-CCNC: 15 U/L (ref 12–45)
BILIRUB SERPL-MCNC: 0.4 MG/DL (ref 0.1–1.5)
BUN SERPL-MCNC: 35 MG/DL (ref 8–22)
CALCIUM ALBUM COR SERPL-MCNC: 10.6 MG/DL (ref 8.5–10.5)
CALCIUM SERPL-MCNC: 10.5 MG/DL (ref 8.4–10.2)
CHLORIDE SERPL-SCNC: 106 MMOL/L (ref 96–112)
CO2 SERPL-SCNC: 21 MMOL/L (ref 20–33)
CREAT SERPL-MCNC: 1.8 MG/DL (ref 0.5–1.4)
ERYTHROCYTE [DISTWIDTH] IN BLOOD BY AUTOMATED COUNT: 49.7 FL (ref 35.9–50)
FASTING STATUS PATIENT QL REPORTED: NORMAL
GFR SERPLBLD CREATININE-BSD FMLA CKD-EPI: 29 ML/MIN/1.73 M 2
GLOBULIN SER CALC-MCNC: 2.6 G/DL (ref 1.9–3.5)
GLUCOSE SERPL-MCNC: 156 MG/DL (ref 65–99)
HCT VFR BLD AUTO: 42.2 % (ref 37–47)
HGB BLD-MCNC: 13.2 G/DL (ref 12–16)
MCH RBC QN AUTO: 27.7 PG (ref 27–33)
MCHC RBC AUTO-ENTMCNC: 31.3 G/DL (ref 32.2–35.5)
MCV RBC AUTO: 88.7 FL (ref 81.4–97.8)
PLATELET # BLD AUTO: 113 K/UL (ref 164–446)
PMV BLD AUTO: 11.8 FL (ref 9–12.9)
POTASSIUM SERPL-SCNC: 5.3 MMOL/L (ref 3.6–5.5)
PROT SERPL-MCNC: 6.5 G/DL (ref 6–8.2)
RBC # BLD AUTO: 4.76 M/UL (ref 4.2–5.4)
SODIUM SERPL-SCNC: 136 MMOL/L (ref 135–145)
WBC # BLD AUTO: 3.9 K/UL (ref 4.8–10.8)

## 2024-11-11 PROCEDURE — 85027 COMPLETE CBC AUTOMATED: CPT

## 2024-11-11 PROCEDURE — 36415 COLL VENOUS BLD VENIPUNCTURE: CPT

## 2024-11-11 PROCEDURE — 80053 COMPREHEN METABOLIC PANEL: CPT

## 2024-11-13 ENCOUNTER — OFFICE VISIT (OUTPATIENT)
Dept: MEDICAL GROUP | Facility: MEDICAL CENTER | Age: 74
End: 2024-11-13
Payer: MEDICARE

## 2024-11-13 VITALS
TEMPERATURE: 97.9 F | WEIGHT: 123 LBS | BODY MASS INDEX: 22.63 KG/M2 | DIASTOLIC BLOOD PRESSURE: 60 MMHG | OXYGEN SATURATION: 97 % | SYSTOLIC BLOOD PRESSURE: 130 MMHG | HEART RATE: 48 BPM | HEIGHT: 62 IN | RESPIRATION RATE: 18 BRPM

## 2024-11-13 DIAGNOSIS — I10 PRIMARY HYPERTENSION: Chronic | ICD-10-CM

## 2024-11-13 DIAGNOSIS — I25.111 CORONARY ARTERY DISEASE INVOLVING NATIVE CORONARY ARTERY OF NATIVE HEART WITH ANGINA PECTORIS WITH DOCUMENTED SPASM (HCC): ICD-10-CM

## 2024-11-13 DIAGNOSIS — Z51.81 ENCOUNTER FOR MEDICATION MONITORING: ICD-10-CM

## 2024-11-13 DIAGNOSIS — Z79.4 TYPE 2 DIABETES MELLITUS WITH STAGE 5 CHRONIC KIDNEY DISEASE NOT ON CHRONIC DIALYSIS, WITH LONG-TERM CURRENT USE OF INSULIN (HCC): ICD-10-CM

## 2024-11-13 DIAGNOSIS — N18.6 ESRD (END STAGE RENAL DISEASE) (HCC): ICD-10-CM

## 2024-11-13 DIAGNOSIS — E11.22 TYPE 2 DIABETES MELLITUS WITH STAGE 5 CHRONIC KIDNEY DISEASE NOT ON CHRONIC DIALYSIS, WITH LONG-TERM CURRENT USE OF INSULIN (HCC): ICD-10-CM

## 2024-11-13 DIAGNOSIS — N18.5 TYPE 2 DIABETES MELLITUS WITH STAGE 5 CHRONIC KIDNEY DISEASE NOT ON CHRONIC DIALYSIS, WITH LONG-TERM CURRENT USE OF INSULIN (HCC): ICD-10-CM

## 2024-11-13 PROBLEM — B34.9 VIRAL ILLNESS: Status: RESOLVED | Noted: 2024-09-23 | Resolved: 2024-11-13

## 2024-11-13 PROBLEM — E11.29 TYPE 2 DIABETES MELLITUS WITH KIDNEY COMPLICATION, WITHOUT LONG-TERM CURRENT USE OF INSULIN (HCC): Status: RESOLVED | Noted: 2024-10-15 | Resolved: 2024-11-13

## 2024-11-13 PROBLEM — J18.9 MULTIFOCAL PNEUMONIA: Status: RESOLVED | Noted: 2023-03-31 | Resolved: 2024-11-13

## 2024-11-13 PROCEDURE — 3075F SYST BP GE 130 - 139MM HG: CPT | Performed by: NURSE PRACTITIONER

## 2024-11-13 PROCEDURE — 3078F DIAST BP <80 MM HG: CPT | Performed by: NURSE PRACTITIONER

## 2024-11-13 PROCEDURE — 99214 OFFICE O/P EST MOD 30 MIN: CPT | Performed by: NURSE PRACTITIONER

## 2024-11-13 ASSESSMENT — FIBROSIS 4 INDEX: FIB4 SCORE: 2.54

## 2024-11-13 NOTE — PROGRESS NOTES
Subjective:     History of Present Illness  The patient is a 73-year-old female here for a post-hospital follow-up. She was admitted from 10/14/2024 to 10/16/2024 for hyperkalemia and hypotension, which revealed acute on chronic renal failure with a potassium level of 8. During her hospital stay, she was diagnosed with bradycardia due to metoprolol use for her atrial fibrillation. She was stabilized in the hospital, and nephrology was consulted. Her metoprolol, ARB, and amiodarone were discontinued. She has a follow-up appointment scheduled with her nephrologist, Dr. Ng, in December 2024. She repeated blood work ordered by vascular 2 days ago, which showed stable and improved renal function and normalized electrolytes with a potassium level of 5.3. She is accompanied by her son, and a virtual  is being used for the visit.    She reports feeling better than before. However, she expresses concern about one of her diabetes medications, Farxiga, as she does not perceive any improvement in her blood sugars. She monitors her blood sugar levels after taking the medication and does not notice any difference. She recalls discussing this with the diabetes nurse practitioner a few months ago. She experiences dizziness and perioral numbness after taking the medication, but it subsides later. She was informed by the pharmacist that this could occur when starting the medication. She takes the medication daily but has not taken it today. She occasionally checks her blood pressure at home, particularly when experiencing dizziness.    In reviewing her medications today, she has stopped her metoprolol and losartan, but continues to take her amiodarone.      She mentions that her insulin does not seem to help her. She checks her pre meal glucose, administers the insulin prior to eating, and repeats glucose 2 hours after eating but it remains the same. She is unsure if she should eat before or after administering the  insulin. She has an appointment with her diabetes doctor on 01/15/2025. She had a blood test on Monday. She is currently taking Eliquis, one in the morning and one at night.    She reports getting a phone call from the hospital advising her to reduce her tacrolimus dose from 1 mg in the morning and 2 mg at night to 1 mg in the morning and 1 mg at night, however her tacrolimus level while in the hospital was low.       Current medicines (including changes today)  Current Outpatient Medications   Medication Sig Dispense Refill    acetaminophen (TYLENOL) 500 MG Tab Take 500-1,000 mg by mouth every 6 hours as needed.      insulin aspart (NOVOLOG) 100 UNIT/ML Solution Inject  under the skin 4 Times a Day,Before Meals and at Bedtime. Per unknown sliding scale      insulin glargine (LANTUS) 100 UNIT/ML SC SOLN Inject 10 Units under the skin 2 times a day.      BD PEN NEEDLE PETE 2ND GEN USE AS DIRECTED WITH INSULIN PENS SIX TIMES DAILY 200 Each 3    amLODIPine (NORVASC) 10 MG Tab TOME FERNANDO TABLETA TODOS LOS MCCORMICK 90 Tablet 1    amiodarone (CORDARONE) 200 MG Tab TOME FERNANDO TABLETA TODOS LOS MCCORMICK 90 Tablet 1    Continuous Glucose Sensor (FREESTYLE BALDOMERO 3 SENSOR) Misc 1 Each every 14 days. 6 Each 3    Continuous Glucose  (FREESTYLE BALDOMERO 3 READER) Device 1 Each every day. 1 Each 0    levothyroxine (SYNTHROID) 88 MCG Tab Take 1 Tablet by mouth every morning on an empty stomach. 90 Tablet 3    furosemide (LASIX) 40 MG Tab TOME 1 TABLETA POR VIA ORAL TODOS LOS MCCORMICK 90 Tablet 2    apixaban (ELIQUIS) 2.5mg Tab Take 1 Tablet by mouth 2 times a day. 60 Tablet 5    FARXIGA 5 MG Tab Take 1 Tablet by mouth every day. Por diabetes 90 Tablet 3    glucose blood (ACCU-CHEK GUIDE) strip USE ONE COVERED STRIP TO TEST BLOOD SUGAR THREE TIMES DAILY. 100 Strip 3    Lancets Use one covered lancet to test blood sugar three times daily. 100 Each 3    atorvastatin (LIPITOR) 20 MG Tab TAKE 1 TABLET BY MOUTH EVERY DAY IN THE EVENING 90 Tablet 3     tacrolimus (PROGRAF) 1 MG Cap Take 1-2 Capsules by mouth 2 times a day. 1 mg (1 tab) every AM  2 mg (2 tab) every PM (Patient not taking: Reported on 11/13/2024) 30 Capsule 0    gabapentin (NEURONTIN) 100 MG Cap For 4 days take 100 mg at night Then for the following 4 days take 100 mg in the morning and 100 mg at night Then take 100 mg in the morning, 100 mg at noon, and 100 mg at night, then stay at this dose (Patient not taking: Reported on 11/13/2024) 90 Capsule 1    triamcinolone acetonide (KENALOG) 0.1 % Cream Aplique bernardo cantidad del tamano de un guisante sobre la piel bernardo vez al fernanda janna sea necesario para la picazon o el sarpullido. (Patient not taking: Reported on 11/13/2024) 45 g 2    sodium bicarbonate (SODIUM BICARBONATE) 650 MG Tab Take 1 Tablet by mouth 2 times a day. (Patient not taking: Reported on 11/13/2024) 180 Tablet 3    mycophenolate (CELLCEPT) 250 MG Cap Take 1 Capsule by mouth 2 times a day. (Patient not taking: Reported on 11/13/2024) 20 Capsule 0    predniSONE (DELTASONE) 5 MG Tab Take 5 mg by mouth every day. (Patient not taking: Reported on 11/13/2024)       No current facility-administered medications for this visit.     She  has a past medical history of Acquired hypothyroidism (05/04/2020), CAD (coronary artery disease), Chronic diastolic heart failure ([facility]) (05/04/2020), Coronary artery disease due to lipid rich plaque, Dental disorder, Diabetes ([facility]), ESRD (end stage renal disease) on dialysis ([facility]) (05/04/2020), Hemodialysis patient ([facility]), Hyperlipidemia, Hypertension, Kidney transplant candidate, Kidney transplant recipient (10/31/2022), Presence of drug-eluting stent in right coronary artery, QT prolongation (01/22/2020), RLS (restless legs syndrome) (08/05/2016), and Transaminitis (12/22/2018).    ROS   No chest pain, no shortness of breath, no abdominal pain  Positive ROS as per HPI.  All other systems reviewed and are negative.     Objective:     /60   Pulse (!) 48   " Temp 36.6 °C (97.9 °F)   Resp 18   Ht 1.575 m (5' 2\")   Wt 55.8 kg (123 lb)   SpO2 97%  Body mass index is 22.5 kg/m².   Physical Exam    Constitutional: Alert, no distress.  Skin: Warm, dry, good turgor, no rashes in visible areas.  Eye: Equal, round and reactive, conjunctiva clear, lids normal.  ENMT: Lips without lesions, good dentition  Respiratory: Unlabored respiratory effort  Psych: Alert and oriented x3, normal affect and mood.      Results  Laboratory Studies  Potassium level is 5.3.        Assessment and Plan:   The following treatment plan was discussed    Assessment & Plan  1. Hyperkalemia.  She was admitted from October 14 to October 16 for hyperkalemia with a potassium level of 8. Her recent blood work indicates stable and improved renal function, with normalized electrolytes and a potassium level of 5.3.     2. Hypotension.  She was admitted from October 14 to October 16 for hypotension, bradycardia. DCd metoprolol, losartan, and amiodarone, however patient reports she is still taking amiodarone. Advised her today to discontinue this, HR today is 48, BP is stable. Has follow up with cardiology next week.     3. Acute on chronic renal failure.  Her recent blood work indicates stable and improved renal function. She has a follow-up scheduled with her nephrologist, Dr. Villar, in December. Voalt message sent to Dr. Villar for clarification, he advised that she stay on prior dose and repeat tacrolimus level next week. She was advised to continue her tacrolimus regimen of 1 capsule in the morning and 2 at night, and to undergo a blood test next week to assess her medication levels. Will forward result to neph.     4. Atrial fibrillation.  Her Metoprolol and amiodarone were discontinued during her hospital stay due to BRASH. She did DC BB, but not amio and HR is 48 today. Advised she stop this per hospital recommendation, she has follow up with cardiology next week.     5. Diabetes Mellitus.  She was " advised to administer 15 units of insulin prior to each meal and to take Lantus insulin, 10 units in the morning and 10 units at night. She was instructed to monitor her blood sugar levels when experiencing perioral numbness.  Continue farxiga  Continue follow up with endo    Follow-up  Return in 1 month for follow up.      ORDERS:  1. Type 2 diabetes mellitus with stage 5 chronic kidney disease not on chronic dialysis, with long-term current use of insulin ([facility])    2. Primary hypertension    3. Coronary artery disease involving native coronary artery of native heart with angina pectoris with documented spasm ([facility])    4. ESRD (end stage renal disease) ([facility])    5. Encounter for medication monitoring  - TACROLIMUS BLOOD; Future          The MA is performing the above orders under the direction of Dr. Priest    Please note that this dictation was created using voice recognition software. I have made every reasonable attempt to correct obvious errors, but I expect that there are errors of grammar and possibly content that I did not discover before finalizing the note.      Attestation      Verbal consent was acquired by the patient to use [employer] ambient listening note generation during this visit Yes

## 2024-11-18 ENCOUNTER — HOSPITAL ENCOUNTER (OUTPATIENT)
Dept: LAB | Facility: MEDICAL CENTER | Age: 74
End: 2024-11-18
Attending: NURSE PRACTITIONER
Payer: MEDICARE

## 2024-11-18 DIAGNOSIS — Z51.81 ENCOUNTER FOR MEDICATION MONITORING: ICD-10-CM

## 2024-11-18 PROCEDURE — 80197 ASSAY OF TACROLIMUS: CPT

## 2024-11-18 PROCEDURE — 36415 COLL VENOUS BLD VENIPUNCTURE: CPT

## 2024-11-20 ENCOUNTER — OFFICE VISIT (OUTPATIENT)
Dept: PHYSICAL MEDICINE AND REHAB | Facility: MEDICAL CENTER | Age: 74
End: 2024-11-20
Payer: MEDICARE

## 2024-11-20 ENCOUNTER — OFFICE VISIT (OUTPATIENT)
Dept: CARDIOLOGY | Facility: MEDICAL CENTER | Age: 74
End: 2024-11-20
Attending: INTERNAL MEDICINE
Payer: MEDICARE

## 2024-11-20 VITALS
DIASTOLIC BLOOD PRESSURE: 42 MMHG | WEIGHT: 123 LBS | BODY MASS INDEX: 24.15 KG/M2 | OXYGEN SATURATION: 95 % | SYSTOLIC BLOOD PRESSURE: 122 MMHG | HEIGHT: 60 IN | RESPIRATION RATE: 16 BRPM | HEART RATE: 50 BPM

## 2024-11-20 VITALS
BODY MASS INDEX: 24.41 KG/M2 | TEMPERATURE: 97.9 F | OXYGEN SATURATION: 98 % | HEART RATE: 48 BPM | SYSTOLIC BLOOD PRESSURE: 149 MMHG | DIASTOLIC BLOOD PRESSURE: 51 MMHG | HEIGHT: 60 IN | WEIGHT: 124.34 LBS

## 2024-11-20 DIAGNOSIS — Z79.899 IMMUNOSUPPRESSION DUE TO DRUG THERAPY (HCC): ICD-10-CM

## 2024-11-20 DIAGNOSIS — I25.10 CORONARY ARTERY DISEASE DUE TO LIPID RICH PLAQUE: ICD-10-CM

## 2024-11-20 DIAGNOSIS — M54.42 CHRONIC BILATERAL LOW BACK PAIN WITH LEFT-SIDED SCIATICA: ICD-10-CM

## 2024-11-20 DIAGNOSIS — M54.42 CHRONIC MIDLINE LOW BACK PAIN WITH LEFT-SIDED SCIATICA: ICD-10-CM

## 2024-11-20 DIAGNOSIS — M54.16 LUMBAR RADICULOPATHY: ICD-10-CM

## 2024-11-20 DIAGNOSIS — Z94.0 DECEASED-DONOR KIDNEY TRANSPLANT RECIPIENT: Chronic | ICD-10-CM

## 2024-11-20 DIAGNOSIS — Z79.4 TYPE 2 DIABETES MELLITUS WITH STAGE 5 CHRONIC KIDNEY DISEASE NOT ON CHRONIC DIALYSIS, WITH LONG-TERM CURRENT USE OF INSULIN (HCC): ICD-10-CM

## 2024-11-20 DIAGNOSIS — R20.0 BILATERAL LEG NUMBNESS: Primary | ICD-10-CM

## 2024-11-20 DIAGNOSIS — G89.29 CHRONIC BILATERAL LOW BACK PAIN WITH LEFT-SIDED SCIATICA: ICD-10-CM

## 2024-11-20 DIAGNOSIS — I48.0 PAF (PAROXYSMAL ATRIAL FIBRILLATION) (HCC): ICD-10-CM

## 2024-11-20 DIAGNOSIS — E11.22 TYPE 2 DIABETES MELLITUS WITH STAGE 5 CHRONIC KIDNEY DISEASE NOT ON CHRONIC DIALYSIS, WITH LONG-TERM CURRENT USE OF INSULIN (HCC): ICD-10-CM

## 2024-11-20 DIAGNOSIS — D84.821 IMMUNOSUPPRESSION DUE TO DRUG THERAPY (HCC): ICD-10-CM

## 2024-11-20 DIAGNOSIS — G89.29 CHRONIC MIDLINE LOW BACK PAIN WITH LEFT-SIDED SCIATICA: ICD-10-CM

## 2024-11-20 DIAGNOSIS — I25.83 CORONARY ARTERY DISEASE DUE TO LIPID RICH PLAQUE: ICD-10-CM

## 2024-11-20 DIAGNOSIS — I50.32 CHRONIC HEART FAILURE WITH PRESERVED EJECTION FRACTION (HCC): ICD-10-CM

## 2024-11-20 DIAGNOSIS — D68.69 SECONDARY HYPERCOAGULABLE STATE (HCC): ICD-10-CM

## 2024-11-20 DIAGNOSIS — N18.5 TYPE 2 DIABETES MELLITUS WITH STAGE 5 CHRONIC KIDNEY DISEASE NOT ON CHRONIC DIALYSIS, WITH LONG-TERM CURRENT USE OF INSULIN (HCC): ICD-10-CM

## 2024-11-20 PROBLEM — N18.9 ACUTE KIDNEY INJURY SUPERIMPOSED ON CHRONIC KIDNEY DISEASE (HCC): Status: RESOLVED | Noted: 2023-05-19 | Resolved: 2024-11-20

## 2024-11-20 PROBLEM — N17.9 ACUTE KIDNEY INJURY SUPERIMPOSED ON CHRONIC KIDNEY DISEASE (HCC): Status: RESOLVED | Noted: 2023-05-19 | Resolved: 2024-11-20

## 2024-11-20 PROCEDURE — 1126F AMNT PAIN NOTED NONE PRSNT: CPT | Performed by: STUDENT IN AN ORGANIZED HEALTH CARE EDUCATION/TRAINING PROGRAM

## 2024-11-20 PROCEDURE — 3074F SYST BP LT 130 MM HG: CPT | Performed by: INTERNAL MEDICINE

## 2024-11-20 PROCEDURE — G2211 COMPLEX E/M VISIT ADD ON: HCPCS | Performed by: INTERNAL MEDICINE

## 2024-11-20 PROCEDURE — 3078F DIAST BP <80 MM HG: CPT | Performed by: STUDENT IN AN ORGANIZED HEALTH CARE EDUCATION/TRAINING PROGRAM

## 2024-11-20 PROCEDURE — 99214 OFFICE O/P EST MOD 30 MIN: CPT | Performed by: INTERNAL MEDICINE

## 2024-11-20 PROCEDURE — 99214 OFFICE O/P EST MOD 30 MIN: CPT | Performed by: STUDENT IN AN ORGANIZED HEALTH CARE EDUCATION/TRAINING PROGRAM

## 2024-11-20 PROCEDURE — 3077F SYST BP >= 140 MM HG: CPT | Performed by: STUDENT IN AN ORGANIZED HEALTH CARE EDUCATION/TRAINING PROGRAM

## 2024-11-20 PROCEDURE — 99213 OFFICE O/P EST LOW 20 MIN: CPT | Performed by: INTERNAL MEDICINE

## 2024-11-20 PROCEDURE — 1126F AMNT PAIN NOTED NONE PRSNT: CPT | Performed by: INTERNAL MEDICINE

## 2024-11-20 PROCEDURE — 3078F DIAST BP <80 MM HG: CPT | Performed by: INTERNAL MEDICINE

## 2024-11-20 RX ORDER — GABAPENTIN 100 MG/1
100 CAPSULE ORAL NIGHTLY
Qty: 90 CAPSULE | Refills: 0 | Status: SHIPPED | OUTPATIENT
Start: 2024-11-20 | End: 2025-02-18

## 2024-11-20 ASSESSMENT — PATIENT HEALTH QUESTIONNAIRE - PHQ9
SUM OF ALL RESPONSES TO PHQ QUESTIONS 1-9: 9
5. POOR APPETITE OR OVEREATING: 1 - SEVERAL DAYS
CLINICAL INTERPRETATION OF PHQ2 SCORE: 3

## 2024-11-20 ASSESSMENT — PAIN SCALES - GENERAL: PAINLEVEL_OUTOF10: NO PAIN

## 2024-11-20 ASSESSMENT — FIBROSIS 4 INDEX
FIB4 SCORE: 2.54
FIB4 SCORE: 2.54

## 2024-11-20 NOTE — PROGRESS NOTES
FOLLOW-UP PATIENT VISIT    Interventional Spine and Pain  Physiatry (Physical Medicine and Rehabilitation)     Date of Service: 24   Chief Complaint:   Chief Complaint   Patient presents with    Follow-Up     Lumbar radiculopathy      Patient Name: Tere Gallegos   : 1950   MRN: [id number]       PRIOR HISTORY    Please see new patient note by Dr. Matute for more details.     Interval History  Patient identification: Tere Gallegos,  1950, with history of ESRD s/p kidney transplant on immunosuppression, CAD, chronic heart failure, T2DM (last A1C 9.5 on 24), afib on apixaban, GERD, who presents today for follow-up of low back and left leg pain. She was last seen in clinic on 10/9/24 where her symptoms were thought to be due to lumbar radiculopathy and she was referred to physical therapy and prescribed gabapentin 100 mg TID. She reports that since she was last seen she has been noticing increased numbness and tingling in both her legs as well as stable pain in her low back and left leg. She has not been able to go to physical therapy but she has been doing the home exercises for her back. She reports that she has been taking gabapentin once in the morning and once at night but the morning dose has been making her dizzy.  Otherwise she denies any falls since she was last seen, bowel or bladder changes, fever or chills.        PMHx:   Past Medical History:   Diagnosis Date    Acquired hypothyroidism 2020    CAD (coronary artery disease)     Chronic diastolic heart failure ([facility]) 2020    Coronary artery disease due to lipid rich plaque     2 Synergy NOEMI to 100% RCA stent placed    Dental disorder     partial dentures- uppers    Diabetes ([facility])     oral medication    ESRD (end stage renal disease) on dialysis ([facility]) 2020    Hemodialysis patient ([facility])     M, W, F    Hyperlipidemia     Hypertension     Kidney transplant candidate     Kidney transplant recipient  10/31/2022    Presence of drug-eluting stent in right coronary artery     QT prolongation 2020    RLS (restless legs syndrome) 2016    Transaminitis 2018       PSHx:   Past Surgical History:   Procedure Laterality Date    URETERAL REIMPLANTATION  2023    transplant ureter reimplantation - [facility]    OTHER ABDOMINAL SURGERY Right 10/31/2022     Donor kidney transplant - [facility]    Z CARDIAC CATH  2016    RCA stented with 2 Synergy drug-eluting stents.    RECOVERY  2016    Procedure: CATH LAB [facility] WITH POSSIBLE DR. CASTILLO;  Surgeon: Recoveryonly Surgery;  Location: SURGERY PRE-POST PROC UNIT [facility];  Service:     Z CARDIAC CATH  2016    100% RCA    OTHER Left 2014    left arm upper extremity fistula    OTHER ABDOMINAL SURGERY      left kidney removed due to cancer       Family history   Family History   Problem Relation Age of Onset    Diabetes Sister     Other Sister         liver disease    Diabetes Brother     Heart Disease Neg Hx        Medications:   Outpatient Medications Marked as Taking for the 24 encounter (Office Visit) with Dot Matute M.D.   Medication Sig Dispense Refill    gabapentin (NEURONTIN) 100 MG Cap Take 1 Capsule by mouth every evening for 90 days. 90 Capsule 0    amLODIPine (NORVASC) 10 MG Tab TOME FERNANDO TABLETA TODOS LOS MCCORMICK 90 Tablet 1    levothyroxine (SYNTHROID) 88 MCG Tab Take 1 Tablet by mouth every morning on an empty stomach. 90 Tablet 3    furosemide (LASIX) 40 MG Tab TOME 1 TABLETA POR VIA ORAL TODOS LOS MCCORMICK 90 Tablet 2    FARXIGA 5 MG Tab Take 1 Tablet by mouth every day. Por diabetes 90 Tablet 3        Current Outpatient Medications on File Prior to Visit   Medication Sig Dispense Refill    amLODIPine (NORVASC) 10 MG Tab TOME FERNANDO TABLETA TODOS LOS MCCORMICK 90 Tablet 1    levothyroxine (SYNTHROID) 88 MCG Tab Take 1 Tablet by mouth every morning on an empty stomach. 90 Tablet 3    furosemide (LASIX) 40 MG Tab  TOME 1 TABLETA POR VIA ORAL TODOS LOS MCCORMICK 90 Tablet 2    FARXIGA 5 MG Tab Take 1 Tablet by mouth every day. Por diabetes 90 Tablet 3    tacrolimus (PROGRAF) 1 MG Cap Take 1-2 Capsules by mouth 2 times a day. 1 mg (1 tab) every AM  2 mg (2 tab) every PM (Patient not taking: Reported on 11/13/2024) 30 Capsule 0    acetaminophen (TYLENOL) 500 MG Tab Take 500-1,000 mg by mouth every 6 hours as needed.      insulin aspart (NOVOLOG) 100 UNIT/ML Solution Inject  under the skin 4 Times a Day,Before Meals and at Bedtime. Per unknown sliding scale      insulin glargine (LANTUS) 100 UNIT/ML SC SOLN Inject 10 Units under the skin 2 times a day.      BD PEN NEEDLE PETE 2ND GEN USE AS DIRECTED WITH INSULIN PENS SIX TIMES DAILY 200 Each 3    triamcinolone acetonide (KENALOG) 0.1 % Cream Aplique bernardo cantidad del tamano de un guisante sobre la piel bernardo vez al fernanda janna sea necesario para la picazon o el sarpullido. (Patient not taking: Reported on 11/13/2024) 45 g 2    amiodarone (CORDARONE) 200 MG Tab TOME BERNARDO TABLETA TODOS LOS MCCORMICK 90 Tablet 1    Continuous Glucose Sensor (FREESTYLE BALDOMERO 3 SENSOR) Misc 1 Each every 14 days. 6 Each 3    Continuous Glucose  (FREESTYLE BALDOMERO 3 READER) Device 1 Each every day. 1 Each 0    apixaban (ELIQUIS) 2.5mg Tab Take 1 Tablet by mouth 2 times a day. 60 Tablet 5    glucose blood (ACCU-CHEK GUIDE) strip USE ONE COVERED STRIP TO TEST BLOOD SUGAR THREE TIMES DAILY. 100 Strip 3    Lancets Use one covered lancet to test blood sugar three times daily. 100 Each 3    atorvastatin (LIPITOR) 20 MG Tab TAKE 1 TABLET BY MOUTH EVERY DAY IN THE EVENING 90 Tablet 3    sodium bicarbonate (SODIUM BICARBONATE) 650 MG Tab Take 1 Tablet by mouth 2 times a day. (Patient not taking: Reported on 11/13/2024) 180 Tablet 3    mycophenolate (CELLCEPT) 250 MG Cap Take 1 Capsule by mouth 2 times a day. (Patient not taking: Reported on 11/13/2024) 20 Capsule 0    predniSONE (DELTASONE) 5 MG Tab Take 5 mg by mouth  every day. (Patient not taking: Reported on 11/13/2024)       No current facility-administered medications on file prior to visit.       Allergies:   No Known Allergies    Social Hx:   Social History     Socioeconomic History    Marital status:      Spouse name: Not on file    Number of children: Not on file    Years of education: Not on file    Highest education level: Not on file   Occupational History    Not on file   Tobacco Use    Smoking status: Never    Smokeless tobacco: Never   Vaping Use    Vaping status: Never Used   Substance and Sexual Activity    Alcohol use: No     Alcohol/week: 0.0 oz    Drug use: No    Sexual activity: Not Currently   Other Topics Concern    Not on file   Social History Narrative    Not on file     Social Drivers of Health     Financial Resource Strain: Low Risk  (10/18/2024)    Overall Financial Resource Strain (CARDIA)     Difficulty of Paying Living Expenses: Not very hard   Food Insecurity: No Food Insecurity (10/18/2024)    Hunger Vital Sign     Worried About Running Out of Food in the Last Year: Never true     Ran Out of Food in the Last Year: Never true   Transportation Needs: No Transportation Needs (10/18/2024)    PRAPARE - Transportation     Lack of Transportation (Medical): No     Lack of Transportation (Non-Medical): No   Physical Activity: Inactive (4/9/2024)    Exercise Vital Sign     Days of Exercise per Week: 0 days     Minutes of Exercise per Session: 0 min   Stress: No Stress Concern Present (4/9/2024)    Citizen of [place] of Occupational Health - Occupational Stress Questionnaire     Feeling of Stress : Only a little   Social Connections: Moderately Isolated (4/9/2024)    Social Connection and Isolation Panel [NHANES]     Frequency of Communication with Friends and Family: More than three times a week     Frequency of Social Gatherings with Friends and Family: More than three times a week     Attends Faith Services: Never     Active Member of Clubs or  Organizations: No     Attends Club or Organization Meetings: Never     Marital Status:    Intimate Partner Violence: Not At Risk (10/15/2024)    Humiliation, Afraid, Rape, and Kick questionnaire     Fear of Current or Ex-Partner: No     Emotionally Abused: No     Physically Abused: No     Sexually Abused: No   Housing Stability: Low Risk  (10/18/2024)    Housing Stability Vital Sign     Unable to Pay for Housing in the Last Year: No     Number of Times Moved in the Last Year: 1     Homeless in the Last Year: No         EXAMINATION     Physical Exam:   Vitals: BP (!) 149/51 (BP Location: Right arm, Patient Position: Sitting, BP Cuff Size: Adult)   Pulse (!) 48   Temp 36.6 °C (97.9 °F) (Temporal)   Ht 1.524 m (5')   Wt 56.4 kg (124 lb 5.4 oz)   SpO2 98%     Constitutional:   Body Habitus: Body mass index is 24.28 kg/m².  Cooperation: Fully cooperates with exam  Appearance: Well-groomed, well-nourished  Respiratory:  Breathing comfortable on room air, no audible wheezing  Cardiovascular: Skin appears well-perfused  Psychiatric: Appropriate affect  Gait: Slow gait, no use of ambulatory device, nonantalgic.     Neurologic:  Strength:    Bilateral LE 5/5 in hip flexors, knee extensors and flexors, ankle dorsiflexors and plantarflexors  Reflexes: 2+ in bilateral patella and 1+ in bilateral achilles  Sensation: grossly intact bilaterally dermatomes L3-S1      MEDICAL DECISION MAKING    DATA    Labs:   Lab Results   Component Value Date/Time    SODIUM 136 11/11/2024 07:46 AM    POTASSIUM 5.3 11/11/2024 07:46 AM    CHLORIDE 106 11/11/2024 07:46 AM    CO2 21 11/11/2024 07:46 AM    GLUCOSE 156 (H) 11/11/2024 07:46 AM    BUN 35 (H) 11/11/2024 07:46 AM    CREATININE 1.80 (H) 11/11/2024 07:46 AM    BUNCREATRAT 8 (L) 01/28/2021 07:00 AM        Lab Results   Component Value Date/Time    PROTHROMBTM 13.3 07/28/2023 11:10 AM    INR 0.98 07/28/2023 11:10 AM        Lab Results   Component Value Date/Time    WBC 3.9 (L)  2024 07:46 AM    RBC 4.76 2024 07:46 AM    HEMOGLOBIN 13.2 2024 07:46 AM    HEMATOCRIT 42.2 2024 07:46 AM    MCV 88.7 2024 07:46 AM    MCH 27.7 2024 07:46 AM    MCHC 31.3 (L) 2024 07:46 AM    MPV 11.8 2024 07:46 AM    NEUTSPOLYS 77.70 (H) 10/21/2024 07:08 AM    LYMPHOCYTES 12.20 (L) 10/21/2024 07:08 AM    MONOCYTES 8.40 10/21/2024 07:08 AM    EOSINOPHILS 1.10 10/21/2024 07:08 AM    BASOPHILS 0.30 10/21/2024 07:08 AM    HYPOCHROMIA 1+ 10/21/2024 07:08 AM    ANISOCYTOSIS 1+ 2023 07:31 AM        Lab Results   Component Value Date/Time    HBA1C 9.5 (A) 2024 09:30 AM          Imaging:         Results for orders placed during the hospital encounter of 10/19/22    CT-ABDOMEN-PELVIS W/O    Impression  1.  No free intraperitoneal air.    2.  No evidence of bowel obstruction or acute appendicitis. No free fluid.    3.  Surgical absence left kidney. No right hydronephrosis or urolithiasis.    4.  Bilateral lung base atelectasis/edema and small pleural effusions.   Results for orders placed during the hospital encounter of 23    CT-ABDOMEN-PELVIS WITH    Impression  1.  Right-sided renal transplant is noted in right lower abdomen and pelvis. There is mild right hydronephrosis and ureteral stent extends from the renal pelvis into the urinary bladder.    2.  Post left nephrectomy.    3.  Atrophy of the native right kidney.                       Results for orders placed in visit on 24    DX-CHEST-2 VIEWS    Impression  1.  Cardiomegaly with mild pulmonary vascular congestion.                                  DIAGNOSIS   Visit Diagnoses     ICD-10-CM   1. Bilateral leg numbness  R20.0   2. Chronic bilateral low back pain with left-sided sciatica  M54.42    G89.29   3. Type 2 diabetes mellitus with stage 5 chronic kidney disease not on chronic dialysis, with long-term current use of insulin (HCC)  E11.22    N18.5    Z79.4   4. -donor kidney transplant  recipient  Z94.0   5. Lumbar radiculopathy  M54.16   6. Chronic midline low back pain with left-sided sciatica  M54.42    G89.29         ASSESSMENT and PLAN:     Tere Gallegos is a 73 y.o. female who returns to clinic for follow-up of low back and left leg pain.    Tere was seen today for follow-up.    Diagnoses and all orders for this visit:    Bilateral leg numbness  -     Referral to EMG - Physiatry (PMR)  -     MR-LUMBAR SPINE-W/O; Future    Chronic bilateral low back pain with left-sided sciatica  -     MR-LUMBAR SPINE-W/O; Future    Type 2 diabetes mellitus with stage 5 chronic kidney disease not on chronic dialysis, with long-term current use of insulin ([facility])  -     Referral to EMG - Physiatry (PMR)  -     MR-LUMBAR SPINE-W/O; Future    -donor kidney transplant recipient  -     Referral to EMG - Physiatry (PMR)    Lumbar radiculopathy  -     gabapentin (NEURONTIN) 100 MG Cap; Take 1 Capsule by mouth every evening for 90 days.    Chronic midline low back pain with left-sided sciatica  -     gabapentin (NEURONTIN) 100 MG Cap; Take 1 Capsule by mouth every evening for 90 days.        Since she was last seen approximately 6 weeks ago patient reports stable bilateral low back and left leg pain as well as worsening numbness and burning pain in both feet and lower legs despite 6 weeks of physician guided home exercises.  Her back and leg pain is most consistent with lumbar radiculopathy and her bilateral feet and leg numbness and burning pain are likely secondary to peripheral neuropathy in the setting of her diabetes and chronic kidney disease.  I would like to obtain EMG/NCS of the bilateral lower extremities in order to evaluate for evidence of peripheral neuropathy versus lumbar radiculopathy.  I would also like to obtain MRI of the lumbar spine in order to evaluate for evidence of lumbar radiculopathy and arthropathy.  We also discussed that she should start taking the gabapentin 100 mg  at night only as the daytime dose has been making her dizzy.    Follow up: In approximately 4 weeks to discuss results of EMG and lumbar MRI    Thank you for allowing me to participate in the care of this patient. If you have any questions please not hesitate to contact me.         Please note that this dictation was created using voice recognition software. I have made every reasonable attempt to correct obvious errors but there may be errors of grammar and content that I may have overlooked prior to finalization of this note.    Dot Matute MD  Interventional Spine and Sports Physiatry  Physical Medicine and Rehabilitation  [facility]

## 2024-11-21 LAB — TACROLIMUS BLD-MCNC: 5.3 NG/ML (ref 5–20)

## 2024-11-21 NOTE — PROGRESS NOTES
CARDIOLOGY OUTPATIENT FOLLOWUP    PCP: ANGELITA Concepcion    1. PAF (paroxysmal atrial fibrillation) (HCC)    2. Coronary artery disease due to lipid rich plaque    3. -donor kidney transplant recipient    4. Secondary hypercoagulable state (HCC)    5. Chronic heart failure with preserved ejection fraction (HCC)    6. Immunosuppression due to drug therapy (HCC)        Tere Gallegos is stable from a cardiovascular standpoint.  I advise no changes to the medications.  She is having a variety of symptoms attributable to peripheral neuropathy and is experiencing poorly controlled blood sugars.  I encouraged to follow-up with endocrinology which is planned for January.  She will continue on the current cardiovascular medicines    Follow up: 6 months    History: Tere Gallegos is a 73 y.o. female with PAF, RCA , normal LVEF, renal transplant and diabetes presenting for follow-up.    She had been experiencing shortness of breath though the symptoms have resolved.  Her main complaints are cold feet and hands as well as burning on the bottom of her feet, perioral tingling after using insulin and unsteadiness in gait.      Physical Exam:  /42 (BP Location: Left arm, Patient Position: Sitting, BP Cuff Size: Adult)   Pulse (!) 50   Resp 16   Ht 1.524 m (5')   Wt 55.8 kg (123 lb)   LMP  (LMP Unknown)   SpO2 95%   BMI 24.02 kg/m²   GEN: NAD  RESP: CTAB  CVS: RRR, No M/R/G  ABD: Soft, NT/ND  EXT: WWP, no edema    () Today's E/M visit is associated with medical care services that serve as the continuing focal point for all needed health care services and/or with medical care services that  are part of ongoing care related to a patient's single, serious condition, or a complex condition: This includes  furnishing services to patients on an ongoing basis that result in care that is personalized  to the patient. The services result in a comprehensive, longitudinal, and  continuous  relationship with the patient and involve delivery of team-based care that is accessible, coordinated with other practitioners and providers, and integrated with the broader health  care landscape.     The ASCVD Risk score (Jose DK, et al., 2019) failed to calculate.    Studies  Lab Results   Component Value Date/Time    CHOLSTRLTOT 123 08/12/2024 07:33 AM    LDL 36 08/12/2024 07:33 AM    HDL 42 08/12/2024 07:33 AM    TRIGLYCERIDE 226 (H) 08/12/2024 07:33 AM       Lab Results   Component Value Date/Time    SODIUM 136 11/11/2024 07:46 AM    POTASSIUM 5.3 11/11/2024 07:46 AM    CHLORIDE 106 11/11/2024 07:46 AM    CO2 21 11/11/2024 07:46 AM    GLUCOSE 156 (H) 11/11/2024 07:46 AM    BUN 35 (H) 11/11/2024 07:46 AM    CREATININE 1.80 (H) 11/11/2024 07:46 AM    BUNCREATRAT 8 (L) 01/28/2021 07:00 AM      Lab Results   Component Value Date/Time    PROTHROMBTM 13.3 07/28/2023 11:10 AM    INR 0.98 07/28/2023 11:10 AM      Lab Results   Component Value Date/Time    WBC 3.9 (L) 11/11/2024 07:46 AM    RBC 4.76 11/11/2024 07:46 AM    HEMOGLOBIN 13.2 11/11/2024 07:46 AM    HEMATOCRIT 42.2 11/11/2024 07:46 AM    MCV 88.7 11/11/2024 07:46 AM    MCH 27.7 11/11/2024 07:46 AM    MCHC 31.3 (L) 11/11/2024 07:46 AM    MPV 11.8 11/11/2024 07:46 AM    NEUTSPOLYS 77.70 (H) 10/21/2024 07:08 AM    LYMPHOCYTES 12.20 (L) 10/21/2024 07:08 AM    MONOCYTES 8.40 10/21/2024 07:08 AM    EOSINOPHILS 1.10 10/21/2024 07:08 AM    BASOPHILS 0.30 10/21/2024 07:08 AM    HYPOCHROMIA 1+ 10/21/2024 07:08 AM    ANISOCYTOSIS 1+ 02/28/2023 07:31 AM        Past Medical History:   Diagnosis Date    Acquired hypothyroidism 05/04/2020    CAD (coronary artery disease)     Chronic diastolic heart failure (HCC) 05/04/2020    Coronary artery disease due to lipid rich plaque     2 Synergy NOEMI to 100% RCA stent placed    Dental disorder     partial dentures- uppers    Diabetes ([facility])     oral medication    ESRD (end stage renal disease) on dialysis ([facility])  05/04/2020    Hemodialysis patient (HCC)     M, W, F    Hyperlipidemia     Hypertension     Kidney transplant candidate     Kidney transplant recipient 10/31/2022    Presence of drug-eluting stent in right coronary artery     QT prolongation 01/22/2020    RLS (restless legs syndrome) 08/05/2016    Transaminitis 12/22/2018     No Known Allergies  Outpatient Encounter Medications as of 11/20/2024   Medication Sig Dispense Refill    gabapentin (NEURONTIN) 100 MG Cap Take 1 Capsule by mouth every evening for 90 days. 90 Capsule 0    tacrolimus (PROGRAF) 1 MG Cap Take 1-2 Capsules by mouth 2 times a day. 1 mg (1 tab) every AM  2 mg (2 tab) every PM 30 Capsule 0    acetaminophen (TYLENOL) 500 MG Tab Take 500-1,000 mg by mouth every 6 hours as needed.      insulin aspart (NOVOLOG) 100 UNIT/ML Solution Inject  under the skin 4 Times a Day,Before Meals and at Bedtime. Per unknown sliding scale      insulin glargine (LANTUS) 100 UNIT/ML SC SOLN Inject 10 Units under the skin 2 times a day.      BD PEN NEEDLE PETE 2ND GEN USE AS DIRECTED WITH INSULIN PENS SIX TIMES DAILY 200 Each 3    triamcinolone acetonide (KENALOG) 0.1 % Cream Aplique bernardo cantidad del tamano de un guisante sobre la piel bernardo vez al fernanda janna sea necesario para la picazon o el sarpullido. 45 g 2    amLODIPine (NORVASC) 10 MG Tab TOME BERNARDO TABLETA TODOS LOS MCCORMICK 90 Tablet 1    Continuous Glucose Sensor (FREESTYLE BALDOMERO 3 SENSOR) Misc 1 Each every 14 days. 6 Each 3    Continuous Glucose  (FREESTYLE BALDOMERO 3 READER) Device 1 Each every day. 1 Each 0    levothyroxine (SYNTHROID) 88 MCG Tab Take 1 Tablet by mouth every morning on an empty stomach. 90 Tablet 3    furosemide (LASIX) 40 MG Tab TOME 1 TABLETA POR VIA ORAL TODOS LOS MCCORMICK 90 Tablet 2    apixaban (ELIQUIS) 2.5mg Tab Take 1 Tablet by mouth 2 times a day. 60 Tablet 5    FARXIGA 5 MG Tab Take 1 Tablet by mouth every day. Por diabetes 90 Tablet 3    glucose blood (ACCU-CHEK GUIDE) strip USE ONE COVERED  STRIP TO TEST BLOOD SUGAR THREE TIMES DAILY. 100 Strip 3    Lancets Use one covered lancet to test blood sugar three times daily. 100 Each 3    atorvastatin (LIPITOR) 20 MG Tab TAKE 1 TABLET BY MOUTH EVERY DAY IN THE EVENING 90 Tablet 3    mycophenolate (CELLCEPT) 250 MG Cap Take 1 Capsule by mouth 2 times a day. 20 Capsule 0    predniSONE (DELTASONE) 5 MG Tab Take 5 mg by mouth every day.      [DISCONTINUED] amiodarone (CORDARONE) 200 MG Tab TOME FERNANDO TABLETA TODOS LOS MCCORMICK (Patient not taking: Reported on 11/20/2024) 90 Tablet 1    [DISCONTINUED] sodium bicarbonate (SODIUM BICARBONATE) 650 MG Tab Take 1 Tablet by mouth 2 times a day. (Patient not taking: Reported on 11/13/2024) 180 Tablet 3     No facility-administered encounter medications on file as of 11/20/2024.     Social History     Socioeconomic History    Marital status:      Spouse name: Not on file    Number of children: Not on file    Years of education: Not on file    Highest education level: Not on file   Occupational History    Not on file   Tobacco Use    Smoking status: Never    Smokeless tobacco: Never   Vaping Use    Vaping status: Never Used   Substance and Sexual Activity    Alcohol use: No     Alcohol/week: 0.0 oz    Drug use: No    Sexual activity: Not Currently   Other Topics Concern    Not on file   Social History Narrative    Not on file     Social Drivers of Health     Financial Resource Strain: Low Risk  (10/18/2024)    Overall Financial Resource Strain (CARDIA)     Difficulty of Paying Living Expenses: Not very hard   Food Insecurity: No Food Insecurity (10/18/2024)    Hunger Vital Sign     Worried About Running Out of Food in the Last Year: Never true     Ran Out of Food in the Last Year: Never true   Transportation Needs: No Transportation Needs (10/18/2024)    PRAPARE - Transportation     Lack of Transportation (Medical): No     Lack of Transportation (Non-Medical): No   Physical Activity: Inactive (4/9/2024)    Exercise Vital  Sign     Days of Exercise per Week: 0 days     Minutes of Exercise per Session: 0 min   Stress: No Stress Concern Present (4/9/2024)    [place] of Occupational Health - Occupational Stress Questionnaire     Feeling of Stress : Only a little   Social Connections: Moderately Isolated (4/9/2024)    Social Connection and Isolation Panel [NHANES]     Frequency of Communication with Friends and Family: More than three times a week     Frequency of Social Gatherings with Friends and Family: More than three times a week     Attends Congregation Services: Never     Active Member of Clubs or Organizations: No     Attends Club or Organization Meetings: Never     Marital Status:    Intimate Partner Violence: Not At Risk (10/15/2024)    Humiliation, Afraid, Rape, and Kick questionnaire     Fear of Current or Ex-Partner: No     Emotionally Abused: No     Physically Abused: No     Sexually Abused: No   Housing Stability: Low Risk  (10/18/2024)    Housing Stability Vital Sign     Unable to Pay for Housing in the Last Year: No     Number of Times Moved in the Last Year: 1     Homeless in the Last Year: No         ROS:   10 point review systems is otherwise negative except as per the HPI    Chief Complaint   Patient presents with    Follow-Up     Paroxysmal atrial fibrillation (HCC)    Hypertension    Hyperlipidemia       Mixed hyperlipidemia

## 2024-12-04 ENCOUNTER — APPOINTMENT (OUTPATIENT)
Dept: PHYSICAL MEDICINE AND REHAB | Facility: MEDICAL CENTER | Age: 74
End: 2024-12-04
Payer: MEDICARE

## 2024-12-04 VITALS
OXYGEN SATURATION: 98 % | DIASTOLIC BLOOD PRESSURE: 48 MMHG | BODY MASS INDEX: 24.19 KG/M2 | WEIGHT: 123.24 LBS | TEMPERATURE: 97.2 F | HEIGHT: 60 IN | SYSTOLIC BLOOD PRESSURE: 132 MMHG | HEART RATE: 48 BPM

## 2024-12-04 DIAGNOSIS — R20.0 BILATERAL LEG NUMBNESS: ICD-10-CM

## 2024-12-04 PROCEDURE — 95909 NRV CNDJ TST 5-6 STUDIES: CPT | Performed by: GENERAL PRACTICE

## 2024-12-04 PROCEDURE — 3075F SYST BP GE 130 - 139MM HG: CPT | Performed by: GENERAL PRACTICE

## 2024-12-04 PROCEDURE — 3078F DIAST BP <80 MM HG: CPT | Performed by: GENERAL PRACTICE

## 2024-12-04 PROCEDURE — 1125F AMNT PAIN NOTED PAIN PRSNT: CPT | Performed by: GENERAL PRACTICE

## 2024-12-04 PROCEDURE — 95886 MUSC TEST DONE W/N TEST COMP: CPT | Mod: LT | Performed by: GENERAL PRACTICE

## 2024-12-04 ASSESSMENT — FIBROSIS 4 INDEX: FIB4 SCORE: 2.54

## 2024-12-04 ASSESSMENT — PATIENT HEALTH QUESTIONNAIRE - PHQ9: CLINICAL INTERPRETATION OF PHQ2 SCORE: 0

## 2024-12-04 ASSESSMENT — PAIN SCALES - GENERAL: PAINLEVEL_OUTOF10: 7=MODERATE-SEVERE PAIN

## 2024-12-04 NOTE — PROCEDURES
Electromyography Report          Name: Tere Gallegos    MRN: [id number]   YOB: 1950 (73 y.o.)   Sex: female   Vitals:  Vitals:    12/04/24 1120   BP: 132/48   Weight: 55.9 kg (123 lb 3.8 oz)   Height: 1.524 m (5')     Body mass index is 24.07 kg/m².    Examining Physician: Shad Elliott D.O.     Visit Diagnoses     ICD-10-CM   1. Bilateral leg numbness  R20.0       EMG Examination Date: 12/4/2024     Impression:      This is an abnormal and technically limited study.   There is electrophysiological findings suggestive of chronic length dependent primarily demyelinating sensory peripheral neuropathy of the lower extremities.  The exam today points AGAINST LEFT peroneal motor mononeuropathy, tibial mononeuropathy,  lumbosacral plexopathy, or lumbarsacral radiculopathy.  Please note, however, that radiculopathy screening via needle EMG assesses motor nerve fibers only, and does not rule out radiculopathies that affects solely sensory nerve roots.    Recommendations: Follow up appointment with Dr Matute and MRI. May consider repeat EMG/NCS testing in 3-6 months. May consider right leg EMG/NCS sooner.     History:   History of Present Illness  The patient presents for evaluation of bilateral paresthesia in the feet, with the left foot being more severely affected than the right. She is accompanied by a , SONAL. The patient also reports experiencing generalized weakness and pain that originates in the lumbar region and radiates down to the lower extremities, consistent with radicular pain.      Physical exam:    Physical Exam  Left hip flexion weakness 4 plus out of 5, aside from that strength was normal. Negative slump leg test bilaterally. Hyporeflexia. Downward Babinski. Cold extremities.     Nerve Conduction Studies and Electromyography  Examination Findings:      Nerve Conduction Studies Examination Findings:      LEFT radial sensory nerve action potential (SNAP) amplitude is normal.  Peak latency is delayed  RIGHT sural sensory nerve action potential (SNAP) amplitude is normal. Peak latency is delayed  LEFT sural sensory nerve action potential (SNAP) amplitude is normal. Peak latency is delayed    LEFT  peroneal compound muscle action potential (CMAP) amplitude is normal. distal latency and conduction velocity are delayed.   LEFT  tibial compound muscle action potential (CMAP) amplitude and distal latency is normal. conduction velocity is delayed.     RIGHT tibial compound muscle action potential (CMAP) amplitude and distal latency is normal. conduction velocity is delayed.       NCS+  Motor Nerve Results      Latency Amplitude F-Lat Segment Distance CV Comment   Site (ms) Norm (mV) Norm (ms)  (cm) (m/s) Norm    Left Fibular (EDB) Motor   Ankle 6.8  < 6.5 1.66  > 2.0         Bel fib head 14.2 - 1.43 -  Bel fib head-Ankle 26 35  > 44    Pop fossa 16.6 - 1.31 -  Pop fossa-Bel fib head 9 38  > 44    Left Tibial (AH) Motor   Ankle 4.9  < 5.8 1.81  > 4.0         Knee 14.5 - 1.67 -  Knee-Ankle 31 32  > 41    Right Tibial (AH) Motor   Ankle 5.6  < 5.8 1.63  > 4.0         Knee 15.5 - 1.45 -  Knee-Ankle 35 35  > 41      Sensory Sites      Neg Peak Lat Amplitude (O-P) Segment Distance Velocity Comment   Site (ms) Norm (µV) Norm  (cm) (ms)    Left Radial Sensory   Forearm-Wrist 3.0  < 2.9 16  > 15 Forearm-Wrist 10     Left Sural Sensory   Calf-Lat mall 4.9  < 4.4 4  > 6 Calf-Lat mall 14     Right Sural Sensory   Calf-Lat mall 4.4  < 4.4 4  > 6 Calf-Lat mall 14       L-R Sites      Latency (P) AMP (O-P)   Site Left Right Side Diff Side Diff Left Right Side Diff   Radial Sensory   Forearm-Wrist 3.0 - - - 16 - -   Sural Sensory   Calf-Lat mall 4.9 4.4 0.50 - 4 4 0     L-R Segments      Velocity   Segment Left Right Side Diff   Radial Sensory   Forearm-Wrist 42 - -   Sural Sensory   Calf-Lat mall 33 36 3     EMG+     Side Muscle Nerve Root Ins.Act Fibs Psw Amp Dur Poly Recrt Int.Pat Comment   Left Tib ant Deep  fib,  Fibular L4-L5 Nml Nml Nml Nml Nml 0 Nml Nml    Left Fib longus Fibular,  Superficial... L5-S1 Nml Nml Nml Decr Nml 0 Nml Nml    Left Vastus med Femoral L2-L4 Nml Nml Nml Nml Nml 0 Nml Nml    Left Gluteus med Sup gluteal L5-S1 Nml Nml Nml Nml Nml 0 Nml Nml    Left Gastroc Tibial S1-S2 Nml Nml Nml Nml Nml 0 Nml Nml    When testing the fibularis longus, the delayed amplitude may be secondary to poor activation or radiculopathy or pain.         Waveforms:    Motor           Sensory                  Nerve conduction studies (NCS) and electromyography (EMG) are utilized to evaluate direct or indirect damage to the peripheral nervous system. NCS are performed to measure the nerve(s) response(s) to electrostimulation across a given nerve segment. EMG evaluates the passive and active electrical activity of the muscle(s) in question.  Muscles are innervated by specific peripheral nerves and roots. Often times, several nerves the muscle to be examined in order to determine the presence or absence of the disease process. Furthermore, nerves and muscles may need to be tested in a [id number] comparison, as well as in additional extremities, as this may be crucial in characterizing the extent of the disease process, which may be diffuse or isolated and of varying degree of severity. The extent of the neurodiagnostic exam is justified as it may help arrive to a proper diagnosis, which ultimately may contribute to better management of the patient. Therefore, the nerves to muscles examined during the study were medically necessary.    The patient tolerated testing well, without any complications. The study was done with a concentric needle examination.   Reference values are from the [facility] normative data guidelines and [place] related to age guidelines.     This information is more accurate than what was recorded on the EMG computer.     cpt 45933. Nerve conduction studies,  5-6 studies  CPT 00952. needle  electromyography, complete, each extremity.       Shad Elliott D.O.

## 2024-12-07 ENCOUNTER — HOSPITAL ENCOUNTER (OUTPATIENT)
Dept: LAB | Facility: MEDICAL CENTER | Age: 74
End: 2024-12-07
Attending: INTERNAL MEDICINE
Payer: MEDICARE

## 2024-12-07 LAB
ALBUMIN SERPL BCP-MCNC: 4.2 G/DL (ref 3.2–4.9)
ALBUMIN/GLOB SERPL: 1.8 G/DL
ALP SERPL-CCNC: 100 U/L (ref 30–99)
ALT SERPL-CCNC: 20 U/L (ref 2–50)
ANION GAP SERPL CALC-SCNC: 12 MMOL/L (ref 7–16)
APPEARANCE UR: CLEAR
AST SERPL-CCNC: 16 U/L (ref 12–45)
BACTERIA #/AREA URNS HPF: ABNORMAL /HPF
BASOPHILS # BLD AUTO: 0.2 % (ref 0–1.8)
BASOPHILS # BLD: 0.01 K/UL (ref 0–0.12)
BILIRUB SERPL-MCNC: 0.4 MG/DL (ref 0.1–1.5)
BILIRUB UR QL STRIP.AUTO: NEGATIVE
BUN SERPL-MCNC: 53 MG/DL (ref 8–22)
CALCIUM ALBUM COR SERPL-MCNC: 10.4 MG/DL (ref 8.5–10.5)
CALCIUM SERPL-MCNC: 10.6 MG/DL (ref 8.4–10.2)
CASTS URNS QL MICRO: ABNORMAL /LPF (ref 0–2)
CHLORIDE SERPL-SCNC: 106 MMOL/L (ref 96–112)
CO2 SERPL-SCNC: 21 MMOL/L (ref 20–33)
COLOR UR: YELLOW
CREAT SERPL-MCNC: 2 MG/DL (ref 0.5–1.4)
EOSINOPHIL # BLD AUTO: 0.01 K/UL (ref 0–0.51)
EOSINOPHIL NFR BLD: 0.2 % (ref 0–6.9)
EPITHELIAL CELLS 1715: ABNORMAL /HPF (ref 0–5)
ERYTHROCYTE [DISTWIDTH] IN BLOOD BY AUTOMATED COUNT: 47.9 FL (ref 35.9–50)
FASTING STATUS PATIENT QL REPORTED: NORMAL
GFR SERPLBLD CREATININE-BSD FMLA CKD-EPI: 26 ML/MIN/1.73 M 2
GLOBULIN SER CALC-MCNC: 2.4 G/DL (ref 1.9–3.5)
GLUCOSE SERPL-MCNC: 163 MG/DL (ref 65–99)
GLUCOSE UR STRIP.AUTO-MCNC: 500 MG/DL
HCT VFR BLD AUTO: 45.5 % (ref 37–47)
HGB BLD-MCNC: 14.4 G/DL (ref 12–16)
HYALINE CAST   1831: PRESENT /LPF
IMM GRANULOCYTES # BLD AUTO: 0.02 K/UL (ref 0–0.11)
IMM GRANULOCYTES NFR BLD AUTO: 0.5 % (ref 0–0.9)
KETONES UR STRIP.AUTO-MCNC: NEGATIVE MG/DL
LEUKOCYTE ESTERASE UR QL STRIP.AUTO: ABNORMAL
LYMPHOCYTES # BLD AUTO: 0.5 K/UL (ref 1–4.8)
LYMPHOCYTES NFR BLD: 11.7 % (ref 22–41)
MCH RBC QN AUTO: 27.6 PG (ref 27–33)
MCHC RBC AUTO-ENTMCNC: 31.6 G/DL (ref 32.2–35.5)
MCV RBC AUTO: 87.3 FL (ref 81.4–97.8)
MICRO URNS: ABNORMAL
MONOCYTES # BLD AUTO: 0.39 K/UL (ref 0–0.85)
MONOCYTES NFR BLD AUTO: 9.2 % (ref 0–13.4)
NEUTROPHILS # BLD AUTO: 3.33 K/UL (ref 1.82–7.42)
NEUTROPHILS NFR BLD: 78.2 % (ref 44–72)
NITRITE UR QL STRIP.AUTO: POSITIVE
NRBC # BLD AUTO: 0 K/UL
NRBC BLD-RTO: 0 /100 WBC (ref 0–0.2)
PH UR STRIP.AUTO: 5.5 [PH] (ref 5–8)
PLATELET # BLD AUTO: 111 K/UL (ref 164–446)
POTASSIUM SERPL-SCNC: 6 MMOL/L (ref 3.6–5.5)
PROT SERPL-MCNC: 6.6 G/DL (ref 6–8.2)
PROT UR QL STRIP: NEGATIVE MG/DL
RBC # BLD AUTO: 5.21 M/UL (ref 4.2–5.4)
RBC # URNS HPF: ABNORMAL /HPF
RBC UR QL AUTO: NEGATIVE
SODIUM SERPL-SCNC: 139 MMOL/L (ref 135–145)
SP GR UR STRIP.AUTO: 1.01
WBC # BLD AUTO: 4.3 K/UL (ref 4.8–10.8)
WBC #/AREA URNS HPF: ABNORMAL /HPF

## 2024-12-07 PROCEDURE — 80053 COMPREHEN METABOLIC PANEL: CPT

## 2024-12-07 PROCEDURE — 36415 COLL VENOUS BLD VENIPUNCTURE: CPT

## 2024-12-07 PROCEDURE — 87077 CULTURE AEROBIC IDENTIFY: CPT

## 2024-12-07 PROCEDURE — 87186 SC STD MICRODIL/AGAR DIL: CPT

## 2024-12-07 PROCEDURE — 80197 ASSAY OF TACROLIMUS: CPT

## 2024-12-07 PROCEDURE — 85025 COMPLETE CBC W/AUTO DIFF WBC: CPT

## 2024-12-07 PROCEDURE — 81001 URINALYSIS AUTO W/SCOPE: CPT

## 2024-12-07 PROCEDURE — 87799 DETECT AGENT NOS DNA QUANT: CPT

## 2024-12-07 PROCEDURE — 87086 URINE CULTURE/COLONY COUNT: CPT

## 2024-12-10 LAB
BK PLASMA INTERP, QNT NAAT NL11711: DETECTED
BK PLASMA IU/ML, QNT NAAT NL11709: 38 IU/ML
BK PLASMA LOG IU/ML, QNT NAAT NL11710: 1.57 LOG IU/ML
BK UR INTERP, QNT NAAT NL11708: DETECTED
BK UR IU/ML, QNT NAAT NL11706: ABNORMAL IU/ML
BK UR LOG IU/ML, QNT NAAT NL11707: 5.44 LOG IU/ML
TACROLIMUS BLD-MCNC: 12.7 NG/ML (ref 5–20)

## 2024-12-12 ENCOUNTER — OFFICE VISIT (OUTPATIENT)
Dept: NEPHROLOGY | Facility: MEDICAL CENTER | Age: 74
End: 2024-12-12
Payer: MEDICARE

## 2024-12-12 VITALS
SYSTOLIC BLOOD PRESSURE: 124 MMHG | WEIGHT: 120 LBS | BODY MASS INDEX: 23.56 KG/M2 | TEMPERATURE: 96.9 F | DIASTOLIC BLOOD PRESSURE: 56 MMHG | HEART RATE: 52 BPM | OXYGEN SATURATION: 97 % | HEIGHT: 60 IN

## 2024-12-12 DIAGNOSIS — Z87.440 HISTORY OF UTI: ICD-10-CM

## 2024-12-12 DIAGNOSIS — Z94.0 DECEASED-DONOR KIDNEY TRANSPLANT RECIPIENT: Chronic | ICD-10-CM

## 2024-12-12 DIAGNOSIS — N18.4 CKD (CHRONIC KIDNEY DISEASE) STAGE 4, GFR 15-29 ML/MIN (HCC): ICD-10-CM

## 2024-12-12 DIAGNOSIS — Z94.0 KIDNEY TRANSPLANT RECIPIENT: Chronic | ICD-10-CM

## 2024-12-12 DIAGNOSIS — E55.9 VITAMIN D DEFICIENCY: ICD-10-CM

## 2024-12-12 DIAGNOSIS — I10 PRIMARY HYPERTENSION: Chronic | ICD-10-CM

## 2024-12-12 DIAGNOSIS — B34.8 BK VIREMIA: ICD-10-CM

## 2024-12-12 DIAGNOSIS — E21.3 HYPERPARATHYROIDISM (HCC): ICD-10-CM

## 2024-12-12 DIAGNOSIS — D64.9 ANEMIA, UNSPECIFIED TYPE: ICD-10-CM

## 2024-12-12 DIAGNOSIS — I10 ESSENTIAL HYPERTENSION: ICD-10-CM

## 2024-12-12 DIAGNOSIS — Z79.899 LONG TERM CURRENT USE OF IMMUNOSUPPRESSIVE DRUG: Chronic | ICD-10-CM

## 2024-12-12 PROBLEM — E87.8 LOW BICARBONATE LEVEL: Status: RESOLVED | Noted: 2023-09-05 | Resolved: 2024-12-12

## 2024-12-12 PROCEDURE — 3078F DIAST BP <80 MM HG: CPT | Performed by: INTERNAL MEDICINE

## 2024-12-12 PROCEDURE — 3074F SYST BP LT 130 MM HG: CPT | Performed by: INTERNAL MEDICINE

## 2024-12-12 PROCEDURE — G2211 COMPLEX E/M VISIT ADD ON: HCPCS | Performed by: INTERNAL MEDICINE

## 2024-12-12 PROCEDURE — 99214 OFFICE O/P EST MOD 30 MIN: CPT | Performed by: INTERNAL MEDICINE

## 2024-12-12 RX ORDER — TACROLIMUS 1 MG/1
1 CAPSULE ORAL 2 TIMES DAILY
Qty: 30 CAPSULE | Refills: 0 | Status: SHIPPED | OUTPATIENT
Start: 2024-12-12

## 2024-12-12 RX ORDER — TACROLIMUS 1 MG/1
1 CAPSULE ORAL 2 TIMES DAILY
Qty: 30 CAPSULE | Refills: 0
Start: 2024-12-12 | End: 2024-12-12 | Stop reason: SDUPTHER

## 2024-12-12 RX ORDER — FUROSEMIDE 80 MG/1
80 TABLET ORAL DAILY
Qty: 100 TABLET | Refills: 3 | Status: SHIPPED | OUTPATIENT
Start: 2024-12-12

## 2024-12-12 ASSESSMENT — ENCOUNTER SYMPTOMS
ABDOMINAL PAIN: 0
BACK PAIN: 1
FEVER: 0
SHORTNESS OF BREATH: 0
NECK PAIN: 1

## 2024-12-12 ASSESSMENT — FIBROSIS 4 INDEX: FIB4 SCORE: 2.42

## 2024-12-12 NOTE — PATIENT INSTRUCTIONS
TACROLIMUS: Danby 1 capsula (1mg) dos veces al fernanda, bernardo vez en la manana, bernardo vez en la noche  FUROSEMIDE: Danby 80mg bernardo vez en la manana

## 2024-12-12 NOTE — Clinical Note
Hi Transplant team. I placed a referral for this patient. It is not for new transplant evaluation as much as it is - I NEED HELP! She had a transplant in 10/2022 and has had many complications. I've been trying to adjust her immunosuppression medication, but my clinic is quite full and I can only see her every 3 months or so. She also has BK Viremia and viruria. Would you be able to see her and help? Feel free to also  her on how to properly use insulin (at one point in the visit today I thought she was telling me she squirted the insulin in her mouth, but she was just saying she could taste it in her mouth????? Ugghh, language barriers!).   Sincerely, Priyank Villar MD Nephrology [facility]

## 2024-12-12 NOTE — PROGRESS NOTES
Chief Complaint   Patient presents with    Chronic Kidney Disease    Kidney Transplant       CC:  follow-up CKD and transplant     services were used in the patient's primary language of English.   Name or Number: Felton [id number]  Mode of interpretation: iPad      HPI:  Tere Gallegos is a 74 y.o. female with a history of end-stage renal disease status post  donor kidney transplant 10/31/2022 at [facility] notable for delayed graft function requiring dialysis until mid 2022, also complicated by a distal ureteral stricture requiring percutaneous transplant nephrostomy followed by ureteral stenting in May 2023 and ureteral re-implantation on 23, diabetes, hypertension, permanent atrial fibrillation, pancytopenia, BK viremia who presents today for follow-up.    Patient was hospitalized in mid May 2023.  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  She required percutaneous nephrostomy on 2023.  She was then hospitalized at [facility] in [place] in late May, 2023. She had PCN removed and ureteral stent placed.  She had ureteral reimplantation done 2023.    Patient hospitalized in early 2023 with chest pain, atrial fibrillation with RVR, improved with medical management. She was admitted in late 2023 with concern for UTI or abscess.     She says she has some back and neck pain. She gets Light-headedness when standing like she needs to lean to one side.     Re: ESRD, patient was on dialysis since .  The etiology of ESRD was thought to be due to diabetes, hypertension, and solitary kidney.  Patient was anuric prior to kidney transplant 10/31/2022.  Patient had delayed graft function and required dialysis until 2022.  Posttransplant course has been complicated by pancytopenia and BK viremia and BK viruria.   Denies taking NSAIDs. She is still taking sodium bicarb 650mg bid.     Re: immunosuppression, patient was induced with Simulect on  10/31/2022.  Patient was originally on mycophenolate 1000 mg p.o. twice daily, tacrolimus 1 mg p.o. twice daily, and prednisone 10 mg daily shortly after transplant.  Her dosages were weaned down. She is currently taking prednisone 5mg daily, Tacrolimus 1mg in morning and 2mg at night, and MMF 250mg BID.      Re: hypertension, she denies LE edema currently.  She checks BP at home but can't remember her numbers, thinks it was 150 systolic yesterday.   She wasn't taking amlodipine, so it was prescribed again 24. She has it with her and says she is taking it now. She's currently on amlodipine 10mg daily, lasix 40mg daily (and she feels diuretic effect). She was taken off losartan in 10/2024 due to high K.     Re: DM2, she remains on insulin. Says her sugars are high 200-300's.  She is using lantus BID and novolog BID. I educated patient to use Novolog qACHS.         Past Medical History:   Diagnosis Date    Acquired hypothyroidism 2020    CAD (coronary artery disease)     Chronic diastolic heart failure ([facility]) 2020    Coronary artery disease due to lipid rich plaque     2 Synergy NOEMI to 100% RCA stent placed    Dental disorder     partial dentures- uppers    Diabetes ([facility])     oral medication    ESRD (end stage renal disease) on dialysis ([facility]) 2020    Hemodialysis patient ([facility])     M, W, F    Hyperlipidemia     Hypertension     Kidney transplant candidate     Kidney transplant recipient 10/31/2022    Presence of drug-eluting stent in right coronary artery     QT prolongation 2020    RLS (restless legs syndrome) 2016    Transaminitis 2018     Past Surgical History:   Procedure Laterality Date    URETERAL REIMPLANTATION  2023    transplant ureter reimplantation - [facility]    OTHER ABDOMINAL SURGERY Right 10/31/2022     Donor kidney transplant - [facility]    ZZZ CARDIAC CATH  2016    RCA stented with 2 Synergy drug-eluting stents.    RECOVERY   08/16/2016    Procedure: CATH LAB [facility] WITH POSSIBLE DR. CASTILLO;  Surgeon: Recoveryonly Surgery;  Location: SURGERY PRE-POST PROC UNIT [facility];  Service:     ZZZ CARDIAC CATH  08/16/2016    100% RCA    OTHER Left 2014    left arm upper extremity fistula    OTHER ABDOMINAL SURGERY      left kidney removed due to cancer         Outpatient Encounter Medications as of 12/12/2024   Medication Sig Dispense Refill    tacrolimus (PROGRAF) 1 MG Cap Take 1 Capsule by mouth 2 times a day. 30 Capsule 0    gabapentin (NEURONTIN) 100 MG Cap Take 1 Capsule by mouth every evening for 90 days. 90 Capsule 0    acetaminophen (TYLENOL) 500 MG Tab Take 500-1,000 mg by mouth every 6 hours as needed.      insulin aspart (NOVOLOG) 100 UNIT/ML Solution Inject  under the skin 4 Times a Day,Before Meals and at Bedtime. Per unknown sliding scale      insulin glargine (LANTUS) 100 UNIT/ML SC SOLN Inject 10 Units under the skin 2 times a day.      BD PEN NEEDLE PETE 2ND GEN USE AS DIRECTED WITH INSULIN PENS SIX TIMES DAILY 200 Each 3    triamcinolone acetonide (KENALOG) 0.1 % Cream Aplique bernardo cantidad del tamano de un guisante sobre la piel bernardo vez al fernanda janna sea necesario para la picazon o el sarpullido. 45 g 2    amLODIPine (NORVASC) 10 MG Tab TOME BERNARDO TABLETA TODOS LOS MCCORMICK 90 Tablet 1    Continuous Glucose Sensor (FREESTYLE BALDOMERO 3 SENSOR) Misc 1 Each every 14 days. 6 Each 3    Continuous Glucose  (FREESTYLE BALDOMERO 3 READER) Device 1 Each every day. 1 Each 0    levothyroxine (SYNTHROID) 88 MCG Tab Take 1 Tablet by mouth every morning on an empty stomach. 90 Tablet 3    furosemide (LASIX) 40 MG Tab TOME 1 TABLETA POR VIA ORAL TODOS LOS MCCORMICK 90 Tablet 2    apixaban (ELIQUIS) 2.5mg Tab Take 1 Tablet by mouth 2 times a day. 60 Tablet 5    FARXIGA 5 MG Tab Take 1 Tablet by mouth every day. Por diabetes 90 Tablet 3    glucose blood (ACCU-CHEK GUIDE) strip USE ONE COVERED STRIP TO TEST BLOOD SUGAR THREE TIMES DAILY. 100 Strip 3     Lancets Use one covered lancet to test blood sugar three times daily. 100 Each 3    atorvastatin (LIPITOR) 20 MG Tab TAKE 1 TABLET BY MOUTH EVERY DAY IN THE EVENING 90 Tablet 3    mycophenolate (CELLCEPT) 250 MG Cap Take 1 Capsule by mouth 2 times a day. 20 Capsule 0    predniSONE (DELTASONE) 5 MG Tab Take 5 mg by mouth every day.      [DISCONTINUED] tacrolimus (PROGRAF) 1 MG Cap Take 1 Capsule by mouth 2 times a day. Updating sig 30 Capsule 0    [DISCONTINUED] tacrolimus (PROGRAF) 1 MG Cap Take 1-2 Capsules by mouth 2 times a day. 1 mg (1 tab) every AM  2 mg (2 tab) every PM 30 Capsule 0     No facility-administered encounter medications on file as of 12/12/2024.        No Known Allergies    Review of Systems   Constitutional:  Negative for fever.   Respiratory:  Negative for shortness of breath.    Cardiovascular:  Negative for chest pain.   Gastrointestinal:  Negative for abdominal pain.   Genitourinary:  Positive for dysuria. Negative for hematuria.   Musculoskeletal:  Positive for back pain and neck pain.   All other systems reviewed and are negative.      /56 (BP Location: Right arm, Patient Position: Sitting, BP Cuff Size: Adult)   Pulse (!) 52   Temp 36.1 °C (96.9 °F) (Temporal)   Ht 1.524 m (5')   Wt 54.4 kg (120 lb)   LMP  (LMP Unknown)   SpO2 97%   BMI 23.44 kg/m²     Physical Exam  Constitutional:       General: She is not in acute distress.  HENT:      Mouth/Throat:      Pharynx: No oropharyngeal exudate.   Eyes:      General: No scleral icterus.  Neck:      Trachea: No tracheal deviation.   Cardiovascular:      Rate and Rhythm: Normal rate and regular rhythm.      Heart sounds: Murmur heard.   Pulmonary:      Effort: Pulmonary effort is normal.      Breath sounds: Normal breath sounds. No stridor. No rales.   Abdominal:      General: Bowel sounds are normal.      Palpations: Abdomen is soft.      Tenderness: There is no abdominal tenderness.   Musculoskeletal:         General: Normal range  of motion.      Cervical back: Neck supple.      Right lower leg: Edema (1+) present.      Left lower leg: Edema (1+) present.   Skin:     General: Skin is warm and dry.      Findings: No rash.   Neurological:      General: No focal deficit present.      Mental Status: She is alert and oriented to person, place, and time.   Psychiatric:         Mood and Affect: Mood and affect normal.         Behavior: Behavior normal.     Dialysis access: Left brachiobasilic AV fistula, with patent bruit and thrill.         Labs reviewed.  Recent Labs     07/19/24  0729 08/12/24  0733 09/23/24  0054 10/15/24  0043 10/15/24  0524 10/15/24  0740 10/15/24  0944 10/21/24  0708 11/11/24  0746 12/07/24  0837   ALBUMIN 4.2 4.2   < > 4.0  --   --   --  4.0 3.9 4.2   HDL 45 42  --   --   --   --   --   --   --   --    TRIGLYCERIDE 313* 226*  --   --   --   --   --   --   --   --    SODIUM 136 136   < > 130* 139 136   < > 136 136 139   POTASSIUM 5.1 5.0   < > 8.0* 5.2 5.7*   < > 5.7* 5.3 6.0*   CHLORIDE 102 103   < > 104 103 100   < > 105 106 106   CO2 21 19*   < > 16* 21 23   < > 22 21 21   BUN 28* 29*   < > 55* 51* 51*   < > 51* 35* 53*   CREATININE 1.57* 1.47*   < > 2.49* 2.14* 2.42*   < > 2.26* 1.80* 2.00*   PHOSPHORUS  --   --   --  3.6 3.8 4.0  --   --   --   --     < > = values in this interval not displayed.       Lab Results   Component Value Date/Time    WBC 4.3 (L) 12/07/2024 08:37 AM    RBC 5.21 12/07/2024 08:37 AM    HEMOGLOBIN 14.4 12/07/2024 08:37 AM    HEMATOCRIT 45.5 12/07/2024 08:37 AM    MCV 87.3 12/07/2024 08:37 AM    MCH 27.6 12/07/2024 08:37 AM    MCHC 31.6 (L) 12/07/2024 08:37 AM    MPV 11.8 11/11/2024 07:46 AM                  URINALYSIS:  Lab Results   Component Value Date/Time    COLORURINE Yellow 12/07/2024 0837    CLARITY Clear 12/07/2024 0837    SPECGRAVITY 1.015 12/07/2024 0837    PHURINE 5.5 12/07/2024 0837    KETONES Negative 12/07/2024 0837    PROTEINURIN Negative 12/07/2024 0837    BILIRUBINUR Negative  2024    UROBILU 0.2 10/15/2024 1053    NITRITE Positive (A) 2024    LEUKESTERAS Small (A) 2024    OCCULTBLOOD Negative 2024       [facility]  [place]   Component Value Date/Time    TOTPROTUR 16.0 (H) 2023 07      Lab Results   Component Value Date/Time    CREATININEU 81.98 2023             Imaging report(s) reviewed  No orders to display         Assessment:  Tere Gallegos is a 74 y.o. female with a history of end-stage renal disease status post  donor kidney transplant 10/31/2022 at [facility] notable for delayed graft function requiring dialysis until mid 2022, also complicated by a distal ureteral stricture requiring percutaneous transplant nephrostomy followed by ureteral stenting in May 2023 and ureteral re-implantation on 23, diabetes, hypertension, permanent atrial fibrillation, pancytopenia, BK viremia who presents today for follow-up.     Plan:  1. ESRD s/p kidney transplant 10/31/22  -Original ESRD diagnosis from solitary kidney, diabetes, hypertension.  Started hemodialysis in .  Patient remains with functioning kidney transplant.  She remains off of dialysis since 2022.      2.  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  I recommend avoiding NSAIDs and other toxins.  I recommend a low-sodium diet.  Patient unable to tolerate losartan due to hyperkalemia.    3.  Hypertension  - Patient unable to tolerate losartan due to hyperkalemia.  With mild lower extremity edema and ongoing hyperkalemia, I recommend increasing Lasix from 40 mg to 80 mg daily.  Continue amlodipine.  Patient off of metoprolol due to mild ongoing bradycardia.    4. Permanent atrial fibrillation (HCC)  -Rate controlled without medication.  Patient on Eliquis for anticoagulation.    5. Long term current use of immunosuppressive drug complicated by BK viremia  -With  hyperkalemia and high trough tacrolimus level on 12/7/2024, I recommend reducing tacrolimus from 1 mg in the morning and 2 mg in the evening to 1 mg p.o. twice daily.  Maintain mycophenolate 250 mg p.o. twice daily and prednisone 5 mg p.o. once daily.  She does have BK viruria and viremia.  I recommend that she consult with [facility] transplant nephrology to help manage immunosuppression in the setting of BK viremia    6. Pancytopenia (HCC)  -She still has mild leukopenia and thrombocytopenia, but anemia has improved.  This is why she is on low-dose mycophenolate 250 mg p.o. twice daily.  She had known pancytopenia even prior to transplant, and had bone marrow biopsy prior to transplant showing no dysplasia.    7.  ESBL E. coli urinary colonization  - Noted persistently.  Patient with intermittent dysuria at this time which is not consistent with UTI. I do not recommend antibiotics at this time.     8.  Hyperkalemia  - Recurrent.  I recommend increasing Lasix from 40 mg daily to 80 mg daily.  I recommend continuing to avoid ACE inhibitor or ARB.    9.  Metabolic acidosis  - Controlled.  Previously was on sodium bicarbonate, currently not on sodium bicarbonate.    10.  Type 2 diabetes  - Uncontrolled with hyperglycemia.  I recommend maintaining Farxiga 5 mg daily.  Patient does not know how her insulin works, is unsure if she is supposed to take glargine once a day or twice a day, has been taking short acting insulin only twice a day rather than before meals.  I recommend diabetes education and counseling.      Return to clinic in 4 months with pre-clinic labs, and continue to bring home medications and blood pressure log, and ideally family member that knows her medications.      Priyank Villar MD  Nephrology  [facility]

## 2024-12-13 DIAGNOSIS — Z94.0 DECEASED-DONOR KIDNEY TRANSPLANT RECIPIENT: Chronic | ICD-10-CM

## 2024-12-18 ENCOUNTER — OFFICE VISIT (OUTPATIENT)
Dept: MEDICAL GROUP | Facility: MEDICAL CENTER | Age: 74
End: 2024-12-18
Payer: MEDICARE

## 2024-12-18 ENCOUNTER — HOSPITAL ENCOUNTER (OUTPATIENT)
Dept: RADIOLOGY | Facility: MEDICAL CENTER | Age: 74
End: 2024-12-18
Attending: STUDENT IN AN ORGANIZED HEALTH CARE EDUCATION/TRAINING PROGRAM
Payer: MEDICARE

## 2024-12-18 VITALS
DIASTOLIC BLOOD PRESSURE: 62 MMHG | WEIGHT: 122 LBS | HEART RATE: 45 BPM | BODY MASS INDEX: 24.6 KG/M2 | HEIGHT: 59 IN | SYSTOLIC BLOOD PRESSURE: 128 MMHG | TEMPERATURE: 97.3 F | OXYGEN SATURATION: 96 %

## 2024-12-18 DIAGNOSIS — E11.22 TYPE 2 DIABETES MELLITUS WITH STAGE 4 CHRONIC KIDNEY DISEASE, WITH LONG-TERM CURRENT USE OF INSULIN (HCC): ICD-10-CM

## 2024-12-18 DIAGNOSIS — I10 PRIMARY HYPERTENSION: Chronic | ICD-10-CM

## 2024-12-18 DIAGNOSIS — M54.42 CHRONIC BILATERAL LOW BACK PAIN WITH LEFT-SIDED SCIATICA: ICD-10-CM

## 2024-12-18 DIAGNOSIS — G89.29 CHRONIC BILATERAL LOW BACK PAIN WITH LEFT-SIDED SCIATICA: ICD-10-CM

## 2024-12-18 DIAGNOSIS — Z23 NEED FOR VACCINATION: ICD-10-CM

## 2024-12-18 DIAGNOSIS — N18.5 TYPE 2 DIABETES MELLITUS WITH STAGE 5 CHRONIC KIDNEY DISEASE NOT ON CHRONIC DIALYSIS, WITH LONG-TERM CURRENT USE OF INSULIN (HCC): ICD-10-CM

## 2024-12-18 DIAGNOSIS — G25.81 RLS (RESTLESS LEGS SYNDROME): ICD-10-CM

## 2024-12-18 DIAGNOSIS — R20.0 BILATERAL LEG NUMBNESS: ICD-10-CM

## 2024-12-18 DIAGNOSIS — Z79.4 TYPE 2 DIABETES MELLITUS WITH STAGE 5 CHRONIC KIDNEY DISEASE NOT ON CHRONIC DIALYSIS, WITH LONG-TERM CURRENT USE OF INSULIN (HCC): ICD-10-CM

## 2024-12-18 DIAGNOSIS — Z79.4 TYPE 2 DIABETES MELLITUS WITH STAGE 4 CHRONIC KIDNEY DISEASE, WITH LONG-TERM CURRENT USE OF INSULIN (HCC): ICD-10-CM

## 2024-12-18 DIAGNOSIS — N18.4 CKD (CHRONIC KIDNEY DISEASE) STAGE 4, GFR 15-29 ML/MIN (HCC): ICD-10-CM

## 2024-12-18 DIAGNOSIS — N18.4 TYPE 2 DIABETES MELLITUS WITH STAGE 4 CHRONIC KIDNEY DISEASE, WITH LONG-TERM CURRENT USE OF INSULIN (HCC): ICD-10-CM

## 2024-12-18 DIAGNOSIS — E11.22 TYPE 2 DIABETES MELLITUS WITH STAGE 5 CHRONIC KIDNEY DISEASE NOT ON CHRONIC DIALYSIS, WITH LONG-TERM CURRENT USE OF INSULIN (HCC): ICD-10-CM

## 2024-12-18 DIAGNOSIS — I48.0 PAROXYSMAL ATRIAL FIBRILLATION (HCC): ICD-10-CM

## 2024-12-18 PROCEDURE — G0008 ADMIN INFLUENZA VIRUS VAC: HCPCS

## 2024-12-18 PROCEDURE — 72148 MRI LUMBAR SPINE W/O DYE: CPT

## 2024-12-18 PROCEDURE — 90662 IIV NO PRSV INCREASED AG IM: CPT

## 2024-12-18 PROCEDURE — 99214 OFFICE O/P EST MOD 30 MIN: CPT | Mod: 25 | Performed by: NURSE PRACTITIONER

## 2024-12-18 PROCEDURE — 3074F SYST BP LT 130 MM HG: CPT | Performed by: NURSE PRACTITIONER

## 2024-12-18 PROCEDURE — 3078F DIAST BP <80 MM HG: CPT | Performed by: NURSE PRACTITIONER

## 2024-12-18 RX ORDER — POLYETHYLENE GLYCOL 3350 17 G/17G
POWDER, FOR SOLUTION ORAL
Qty: 238 G | Refills: 1 | Status: SHIPPED | OUTPATIENT
Start: 2024-12-18

## 2024-12-18 ASSESSMENT — FIBROSIS 4 INDEX: FIB4 SCORE: 2.42

## 2024-12-18 NOTE — PROGRESS NOTES
Subjective:     History of Present Illness  The patient presents for a follow-up visit after her last visit in November.      services were used in the patient's primary language of [place].     Name or Number: [id number]  Mode of interpretation: iPad    Content of Interpretation       She has been experiencing constipation, with a reported instance of not having a bowel movement for a week until this morning, which was minimal. She is uncertain if her insulin could be causing this issue. She maintains hydration by consuming 2 bottles of water daily and occasionally drinks decaffeinated coffee. She does not consume sodas.    She has been under the care of a cardiologist and nephrologist since her last visit. Her nephrologist adjusted her medication regimen, increasing the dosage of furosemide. Additionally, she was advised to take tacrolimus 1 tablet in the morning and 1 in the afternoon, a change from her previous schedule of 2 tablets at night and 1 in the morning. She reports no adverse effects from these medication adjustments. She also had blood work done prior to her nephrology appointment. She has an upcoming appointment with her nephrologist in 4 months and is supposed to get her labs done before that appointment.    She has been seeing her cardiologist and he kept everything the same.    She has been seeing her pain management doctor for her leg pain. She had an MRI done today and is awaiting the results. She has a follow-up appointment with her pain management doctor in a few weeks.    She is currently on Eliquis and has an appointment with the vascular clinic on 01/03/2025.    She is interested in getting her influenza vaccine today.    MEDICATIONS  Current: Furosemide, tacrolimus, Eliquis      Current medicines (including changes today)  Current Outpatient Medications   Medication Sig Dispense Refill    polyethylene glycol 3350 (MIRALAX) 17 GM/SCOOP Powder Romi lopez  seg n sea necesario para el estre imiento 238 g 1    tacrolimus (PROGRAF) 1 MG Cap Take 1 Capsule by mouth 2 times a day. 30 Capsule 0    furosemide (LASIX) 80 MG Tab Take 1 Tablet by mouth every day. 100 Tablet 3    gabapentin (NEURONTIN) 100 MG Cap Take 1 Capsule by mouth every evening for 90 days. 90 Capsule 0    acetaminophen (TYLENOL) 500 MG Tab Take 500-1,000 mg by mouth every 6 hours as needed.      insulin aspart (NOVOLOG) 100 UNIT/ML Solution Inject  under the skin 4 Times a Day,Before Meals and at Bedtime. Per unknown sliding scale      insulin glargine (LANTUS) 100 UNIT/ML SC SOLN Inject 10 Units under the skin 2 times a day.      BD PEN NEEDLE PETE 2ND GEN USE AS DIRECTED WITH INSULIN PENS SIX TIMES DAILY 200 Each 3    triamcinolone acetonide (KENALOG) 0.1 % Cream Aplique bernardo cantidad del tamano de un guisante sobre la piel bernardo vez al fernanda janna sea necesario para la picazon o el sarpullido. 45 g 2    amLODIPine (NORVASC) 10 MG Tab TOME BERNARDO TABLETA TODOS LOS MCCORMICK 90 Tablet 1    Continuous Glucose Sensor (FREESTYLE BALDOMERO 3 SENSOR) Misc 1 Each every 14 days. 6 Each 3    Continuous Glucose  (FREESTYLE BALDOMERO 3 READER) Device 1 Each every day. 1 Each 0    levothyroxine (SYNTHROID) 88 MCG Tab Take 1 Tablet by mouth every morning on an empty stomach. 90 Tablet 3    apixaban (ELIQUIS) 2.5mg Tab Take 1 Tablet by mouth 2 times a day. 60 Tablet 5    FARXIGA 5 MG Tab Take 1 Tablet by mouth every day. Por diabetes 90 Tablet 3    glucose blood (ACCU-CHEK GUIDE) strip USE ONE COVERED STRIP TO TEST BLOOD SUGAR THREE TIMES DAILY. 100 Strip 3    Lancets Use one covered lancet to test blood sugar three times daily. 100 Each 3    atorvastatin (LIPITOR) 20 MG Tab TAKE 1 TABLET BY MOUTH EVERY DAY IN THE EVENING 90 Tablet 3    mycophenolate (CELLCEPT) 250 MG Cap Take 1 Capsule by mouth 2 times a day. 20 Capsule 0    predniSONE (DELTASONE) 5 MG Tab Take 5 mg by mouth every day.       No current facility-administered  "medications for this visit.     She  has a past medical history of Acquired hypothyroidism (05/04/2020), CAD (coronary artery disease), Chronic diastolic heart failure (HCC) (05/04/2020), Coronary artery disease due to lipid rich plaque, Dental disorder, Diabetes ([facility]), ESRD (end stage renal disease) on dialysis ([facility]) (05/04/2020), Hemodialysis patient ([facility]), Hyperlipidemia, Hypertension, Kidney transplant candidate, Kidney transplant recipient (10/31/2022), Presence of drug-eluting stent in right coronary artery, QT prolongation (01/22/2020), RLS (restless legs syndrome) (08/05/2016), and Transaminitis (12/22/2018).    ROS   No chest pain, no shortness of breath, no abdominal pain  Positive ROS as per HPI.  All other systems reviewed and are negative.     Objective:     /62   Pulse (!) 45   Temp 36.3 °C (97.3 °F) (Temporal)   Ht 1.499 m (4' 11\")   Wt 55.3 kg (122 lb)   SpO2 96%  Body mass index is 24.64 kg/m².   Physical Exam    Constitutional: Alert, no distress.  Skin: Warm, dry, good turgor, no rashes in visible areas.  Eye: Equal, round and reactive, conjunctiva clear, lids normal.  ENMT: Lips without lesions, good dentition  Respiratory: Unlabored respiratory effort  Psych: Alert and oriented x3, normal affect and mood.      Results  Laboratory Studies  Red blood cells and white blood cells were stable. Kidney function was about the same as last time.        Assessment and Plan:   The following treatment plan was discussed    Assessment & Plan  1. Constipation.  Her constipation may be attributed to the increased dosage of furosemide, which can potentially lead to dehydration. She is advised to augment her water intake. A prescription for Miralax for as needed use to manage her constipation as needed will be provided and sent to [facility] on [facility].    2. Chronic kidney disease.  Her kidney function remains stable. She saw her nephrologist last Wednesday, who increased furosemide to 80 mg daily " and decreased tacrolimus to 1 mg BID which I have verified she is taking. She has an upcoming appointment with her nephrologist in 4 months and is supposed to get her labs done before that appointment. She is advised to continue her current medication regimen as prescribed by her nephrologist.    3. Atrial fibrillation  She saw her cardiologist, who kept her medications unchanged. She is advised to continue her current medication regimen as prescribed by her cardiologist.    4. Peripheral neuopathy  She saw her pain management doctor for her leg pain. An lumbar MRI was performed today, but the results are not yet available. She is scheduled to follow up with her pain management doctor in a few weeks.    5. Anticoagulation management.  She is currently on Eliquis and has an appointment with the vascular clinic on 01/03/2025. She is advised to continue her current medication regimen as prescribed by her vascular doctor.    6. Type 2 Diabetes  Unstable  Last A1C 9.5  Verified that she is taking lantus insulin twice daily and Novogarry [facility]  Has follow up with endocrinology in 4 weeks, endo referred her to pharmacotherapy which she has not scheduled  Gave her contact information to get scheduled with them for improved diabetes education and management, she will have her son call and schedule    7. Health maintenance.  She will receive her influenza vaccine today.    Follow-up  The patient will follow up on 01/22/2025.      ORDERS:  1. CKD (chronic kidney disease) stage 4, GFR 15-29 ml/min ([facility])    2. Primary hypertension    3. Type 2 diabetes mellitus with stage 4 chronic kidney disease, with long-term current use of insulin ([facility])    4. Paroxysmal atrial fibrillation (HCC)    5. RLS (restless legs syndrome)    6. Need for vaccination  - INFLUENZA VACCINE, HIGH DOSE (65+ ONLY)            The MA is performing the above orders under the direction of Dr. Priest    Please note that this dictation was created using voice  recognition software. I have made every reasonable attempt to correct obvious errors, but I expect that there are errors of grammar and possibly content that I did not discover before finalizing the note.      Attestation      Verbal consent was acquired by the patient to use [employer] ambient listening note generation during this visit Yes

## 2025-01-01 DIAGNOSIS — I48.91 ATRIAL FIBRILLATION, UNSPECIFIED TYPE (HCC): Chronic | ICD-10-CM

## 2025-01-01 DIAGNOSIS — D68.69 SECONDARY HYPERCOAGULABLE STATE (HCC): ICD-10-CM

## 2025-01-02 RX ORDER — APIXABAN 2.5 MG/1
TABLET, FILM COATED ORAL
Qty: 60 TABLET | Refills: 5 | Status: SHIPPED | OUTPATIENT
Start: 2025-01-02

## 2025-01-04 ENCOUNTER — HOSPITAL ENCOUNTER (OUTPATIENT)
Dept: LAB | Facility: MEDICAL CENTER | Age: 75
End: 2025-01-04
Attending: NURSE PRACTITIONER
Payer: MEDICARE

## 2025-01-04 LAB
ALBUMIN SERPL BCP-MCNC: 4 G/DL (ref 3.2–4.9)
ALBUMIN/GLOB SERPL: 1.4 G/DL
ALP SERPL-CCNC: 87 U/L (ref 30–99)
ALT SERPL-CCNC: 21 U/L (ref 2–50)
ANION GAP SERPL CALC-SCNC: 12 MMOL/L (ref 7–16)
AST SERPL-CCNC: 17 U/L (ref 12–45)
BASOPHILS # BLD AUTO: 0.5 % (ref 0–1.8)
BASOPHILS # BLD: 0.03 K/UL (ref 0–0.12)
BILIRUB SERPL-MCNC: 0.3 MG/DL (ref 0.1–1.5)
BUN SERPL-MCNC: 37 MG/DL (ref 8–22)
CALCIUM ALBUM COR SERPL-MCNC: 10.3 MG/DL (ref 8.5–10.5)
CALCIUM SERPL-MCNC: 10.3 MG/DL (ref 8.4–10.2)
CHLORIDE SERPL-SCNC: 104 MMOL/L (ref 96–112)
CO2 SERPL-SCNC: 23 MMOL/L (ref 20–33)
CREAT SERPL-MCNC: 1.73 MG/DL (ref 0.5–1.4)
EOSINOPHIL # BLD AUTO: 0.04 K/UL (ref 0–0.51)
EOSINOPHIL NFR BLD: 0.7 % (ref 0–6.9)
ERYTHROCYTE [DISTWIDTH] IN BLOOD BY AUTOMATED COUNT: 47.4 FL (ref 35.9–50)
FASTING STATUS PATIENT QL REPORTED: NORMAL
GFR SERPLBLD CREATININE-BSD FMLA CKD-EPI: 30 ML/MIN/1.73 M 2
GLOBULIN SER CALC-MCNC: 2.8 G/DL (ref 1.9–3.5)
GLUCOSE SERPL-MCNC: 112 MG/DL (ref 65–99)
HCT VFR BLD AUTO: 45.4 % (ref 37–47)
HGB BLD-MCNC: 14 G/DL (ref 12–16)
IMM GRANULOCYTES # BLD AUTO: 0.02 K/UL (ref 0–0.11)
IMM GRANULOCYTES NFR BLD AUTO: 0.4 % (ref 0–0.9)
LYMPHOCYTES # BLD AUTO: 0.43 K/UL (ref 1–4.8)
LYMPHOCYTES NFR BLD: 7.8 % (ref 22–41)
MCH RBC QN AUTO: 27.5 PG (ref 27–33)
MCHC RBC AUTO-ENTMCNC: 30.8 G/DL (ref 32.2–35.5)
MCV RBC AUTO: 89.2 FL (ref 81.4–97.8)
MONOCYTES # BLD AUTO: 0.42 K/UL (ref 0–0.85)
MONOCYTES NFR BLD AUTO: 7.6 % (ref 0–13.4)
NEUTROPHILS # BLD AUTO: 4.56 K/UL (ref 1.82–7.42)
NEUTROPHILS NFR BLD: 83 % (ref 44–72)
NRBC # BLD AUTO: 0 K/UL
NRBC BLD-RTO: 0 /100 WBC (ref 0–0.2)
PLATELET # BLD AUTO: 108 K/UL (ref 164–446)
PMV BLD AUTO: 11.5 FL (ref 9–12.9)
POTASSIUM SERPL-SCNC: 4.9 MMOL/L (ref 3.6–5.5)
PROT SERPL-MCNC: 6.8 G/DL (ref 6–8.2)
RBC # BLD AUTO: 5.09 M/UL (ref 4.2–5.4)
SODIUM SERPL-SCNC: 139 MMOL/L (ref 135–145)
WBC # BLD AUTO: 5.5 K/UL (ref 4.8–10.8)

## 2025-01-04 PROCEDURE — 85025 COMPLETE CBC W/AUTO DIFF WBC: CPT

## 2025-01-04 PROCEDURE — 80053 COMPREHEN METABOLIC PANEL: CPT

## 2025-01-04 PROCEDURE — 36415 COLL VENOUS BLD VENIPUNCTURE: CPT

## 2025-01-08 ENCOUNTER — HOSPITAL ENCOUNTER (OUTPATIENT)
Dept: LAB | Facility: MEDICAL CENTER | Age: 75
End: 2025-01-08
Attending: STUDENT IN AN ORGANIZED HEALTH CARE EDUCATION/TRAINING PROGRAM
Payer: MEDICARE

## 2025-01-08 ENCOUNTER — OFFICE VISIT (OUTPATIENT)
Dept: PHYSICAL MEDICINE AND REHAB | Facility: MEDICAL CENTER | Age: 75
End: 2025-01-08
Payer: MEDICARE

## 2025-01-08 VITALS
TEMPERATURE: 97.8 F | BODY MASS INDEX: 21.95 KG/M2 | WEIGHT: 123.9 LBS | HEART RATE: 47 BPM | OXYGEN SATURATION: 98 % | SYSTOLIC BLOOD PRESSURE: 119 MMHG | DIASTOLIC BLOOD PRESSURE: 47 MMHG | HEIGHT: 63 IN

## 2025-01-08 DIAGNOSIS — M54.16 LUMBAR RADICULOPATHY: Primary | ICD-10-CM

## 2025-01-08 DIAGNOSIS — I50.32 CHRONIC HEART FAILURE WITH PRESERVED EJECTION FRACTION (HCC): ICD-10-CM

## 2025-01-08 DIAGNOSIS — N18.4 TYPE 2 DIABETES MELLITUS WITH STAGE 4 CHRONIC KIDNEY DISEASE, UNSPECIFIED WHETHER LONG TERM INSULIN USE (HCC): ICD-10-CM

## 2025-01-08 DIAGNOSIS — E11.22 TYPE 2 DIABETES MELLITUS WITH STAGE 4 CHRONIC KIDNEY DISEASE, UNSPECIFIED WHETHER LONG TERM INSULIN USE (HCC): ICD-10-CM

## 2025-01-08 DIAGNOSIS — G89.29 CHRONIC LEFT-SIDED LOW BACK PAIN WITH LEFT-SIDED SCIATICA: ICD-10-CM

## 2025-01-08 DIAGNOSIS — Z94.0 DECEASED-DONOR KIDNEY TRANSPLANT RECIPIENT: Chronic | ICD-10-CM

## 2025-01-08 DIAGNOSIS — M54.42 CHRONIC LEFT-SIDED LOW BACK PAIN WITH LEFT-SIDED SCIATICA: ICD-10-CM

## 2025-01-08 LAB
EST. AVERAGE GLUCOSE BLD GHB EST-MCNC: 192 MG/DL
HBA1C MFR BLD: 8.3 % (ref 4–5.6)

## 2025-01-08 PROCEDURE — 3078F DIAST BP <80 MM HG: CPT | Performed by: STUDENT IN AN ORGANIZED HEALTH CARE EDUCATION/TRAINING PROGRAM

## 2025-01-08 PROCEDURE — 36415 COLL VENOUS BLD VENIPUNCTURE: CPT

## 2025-01-08 PROCEDURE — 1126F AMNT PAIN NOTED NONE PRSNT: CPT | Performed by: STUDENT IN AN ORGANIZED HEALTH CARE EDUCATION/TRAINING PROGRAM

## 2025-01-08 PROCEDURE — 99214 OFFICE O/P EST MOD 30 MIN: CPT | Performed by: STUDENT IN AN ORGANIZED HEALTH CARE EDUCATION/TRAINING PROGRAM

## 2025-01-08 PROCEDURE — 83036 HEMOGLOBIN GLYCOSYLATED A1C: CPT

## 2025-01-08 PROCEDURE — 3074F SYST BP LT 130 MM HG: CPT | Performed by: STUDENT IN AN ORGANIZED HEALTH CARE EDUCATION/TRAINING PROGRAM

## 2025-01-08 ASSESSMENT — PATIENT HEALTH QUESTIONNAIRE - PHQ9
SUM OF ALL RESPONSES TO PHQ QUESTIONS 1-9: 12
CLINICAL INTERPRETATION OF PHQ2 SCORE: 4
5. POOR APPETITE OR OVEREATING: 2 - MORE THAN HALF THE DAYS

## 2025-01-08 ASSESSMENT — PAIN SCALES - GENERAL: PAINLEVEL_OUTOF10: NO PAIN

## 2025-01-08 ASSESSMENT — FIBROSIS 4 INDEX: FIB4 SCORE: 2.58

## 2025-01-08 NOTE — PROGRESS NOTES
"FOLLOW-UP PATIENT VISIT    Interventional Spine and Pain  Physiatry (Physical Medicine and Rehabilitation)     Date of Service: 25   Chief Complaint:   Chief Complaint   Patient presents with    Follow-Up     EMG FV      Patient Name: Tere Gallegos   : 1950   MRN: [id number]       PRIOR HISTORY    Please see new patient note by Dr. Matute for more details.     Interval History  Patient identification: Tere Gallegos,  1950, with history of ESRD s/p kidney transplant on immunosuppression, CAD, chronic heart failure, T2DM (last A1C 9.5 on 24), afib on apixaban, GERD, who presents today for follow-up of low back and left leg pain. She was last seen in clinic on 24 where symptoms were thought to be secondary to a combination of lumbar radiculopathy and peripheral neuropathy, and I ordered EMG of the lower extremities and MRI of the lumbar spine. EMG of the lower extremities 24 demonstrated sensory peripheral polyneuropathy. She reports that since she was last seen in clinic her pain has overall been reasonably well controlled, but she reports feeling like the left side is \"tired\" or weak. She also reports pain throughout the left side of her upper, mid, and lower back, as well as ongoing pain radiating from the left side of the low back into the left leg which is the most bothersome.        PMHx:   Past Medical History:   Diagnosis Date    Acquired hypothyroidism 2020    CAD (coronary artery disease)     Chronic diastolic heart failure ([facility]) 2020    Coronary artery disease due to lipid rich plaque     2 Synergy NOEMI to 100% RCA stent placed    Dental disorder     partial dentures- uppers    Diabetes ([facility])     oral medication    ESRD (end stage renal disease) on dialysis ([facility]) 2020    Hemodialysis patient ([facility])     M, W, F    Hyperlipidemia     Hypertension     Kidney transplant candidate     Kidney transplant recipient 10/31/2022    Presence of " drug-eluting stent in right coronary artery     QT prolongation 2020    RLS (restless legs syndrome) 2016    Transaminitis 2018       PSHx:   Past Surgical History:   Procedure Laterality Date    URETERAL REIMPLANTATION  2023    [place] [facility] [place]    OTHER ABDOMINAL SURGERY Right 10/31/2022     Donor kidney transplant - [facility]    Z CARDIAC CATH  2016    RCA stented with 2 Synergy drug-eluting stents.    RECOVERY  2016    Procedure: CATH LAB [facility] WITH POSSIBLE DR. CASTILLO;  Surgeon: Recoveryonly Surgery;  Location: SURGERY PRE-POST PROC UNIT [facility] [place];  Service:     Z CARDIAC CATH  2016    100% RCA    OTHER Left     left arm upper extremity fistula    OTHER ABDOMINAL SURGERY      left kidney removed due to cancer       Family history   Family History   Problem Relation Age of Onset    Diabetes Sister     Other Sister         liver disease    Diabetes Brother     Heart Disease Neg Hx        Medications:   Outpatient Medications Marked as Taking for the 25 encounter (Office Visit) with Dot Matute M.D.   Medication Sig Dispense Refill    ELIQUIS 2.5 MG Tab TOME 1 TABLETA POR VIA ORAL DOS VECES AL DESMOND 60 Tablet 5    polyethylene glycol 3350 (MIRALAX) 17 GM/SCOOP Powder Romi bernardo vez al d a seg n sea necesario para el estre imiento 238 g 1    tacrolimus (PROGRAF) 1 MG Cap Take 1 Capsule by mouth 2 times a day. 30 Capsule 0    furosemide (LASIX) 80 MG Tab Take 1 Tablet by mouth every day. 100 Tablet 3    gabapentin (NEURONTIN) 100 MG Cap Take 1 Capsule by mouth every evening for 90 days. 90 Capsule 0    acetaminophen (TYLENOL) 500 MG Tab Take 500-1,000 mg by mouth every 6 hours as needed.      insulin aspart (NOVOLOG) 100 UNIT/ML Solution Inject  under the skin 4 Times a Day,Before Meals and at Bedtime. Per unknown sliding scale      insulin glargine (LANTUS) 100 UNIT/ML SC SOLN Inject 10 Units under the skin 2 times a  day.      BD PEN NEEDLE PETE 2ND GEN USE AS DIRECTED WITH INSULIN PENS SIX TIMES DAILY 200 Each 3    triamcinolone acetonide (KENALOG) 0.1 % Cream Aplique bernardo cantidad del tamano de un guisante sobre la piel bernardo vez al desmond janna sea necesario para la picazon o el sarpullido. 45 g 2    amLODIPine (NORVASC) 10 MG Tab TOME BERNARDO TABLETA TODOS LOS MCCORMICK 90 Tablet 1    Continuous Glucose Sensor (FREESTYLE BALDOMERO 3 SENSOR) Misc 1 Each every 14 days. 6 Each 3    Continuous Glucose  (FREESTYLE BALDOMERO 3 READER) Device 1 Each every day. 1 Each 0    levothyroxine (SYNTHROID) 88 MCG Tab Take 1 Tablet by mouth every morning on an empty stomach. 90 Tablet 3    FARXIGA 5 MG Tab Take 1 Tablet by mouth every day. Por diabetes 90 Tablet 3    glucose blood (ACCU-CHEK GUIDE) strip USE ONE COVERED STRIP TO TEST BLOOD SUGAR THREE TIMES DAILY. 100 Strip 3    Lancets Use one covered lancet to test blood sugar three times daily. 100 Each 3    atorvastatin (LIPITOR) 20 MG Tab TAKE 1 TABLET BY MOUTH EVERY DAY IN THE EVENING 90 Tablet 3    mycophenolate (CELLCEPT) 250 MG Cap Take 1 Capsule by mouth 2 times a day. 20 Capsule 0    predniSONE (DELTASONE) 5 MG Tab Take 5 mg by mouth every day.          Current Outpatient Medications on File Prior to Visit   Medication Sig Dispense Refill    ELIQUIS 2.5 MG Tab TOME 1 TABLETA POR VIA ORAL DOS VECES AL DESMOND 60 Tablet 5    polyethylene glycol 3350 (MIRALAX) 17 GM/SCOOP Powder Romi bernardo vez al d a seg n sea necesario para el estre imiento 238 g 1    tacrolimus (PROGRAF) 1 MG Cap Take 1 Capsule by mouth 2 times a day. 30 Capsule 0    furosemide (LASIX) 80 MG Tab Take 1 Tablet by mouth every day. 100 Tablet 3    gabapentin (NEURONTIN) 100 MG Cap Take 1 Capsule by mouth every evening for 90 days. 90 Capsule 0    acetaminophen (TYLENOL) 500 MG Tab Take 500-1,000 mg by mouth every 6 hours as needed.      insulin aspart (NOVOLOG) 100 UNIT/ML Solution Inject  under the skin 4 Times a Day,Before Meals and at  Bedtime. Per unknown sliding scale      insulin glargine (LANTUS) 100 UNIT/ML SC SOLN Inject 10 Units under the skin 2 times a day.      BD PEN NEEDLE PETE 2ND GEN USE AS DIRECTED WITH INSULIN PENS SIX TIMES DAILY 200 Each 3    triamcinolone acetonide (KENALOG) 0.1 % Cream Aplique bernardo cantidad del tamano de un guisante sobre la piel bernardo vez al fernanda janna sea necesario para la picazon o el sarpullido. 45 g 2    amLODIPine (NORVASC) 10 MG Tab TOME BERNARDO TABLETA TODOS LOS MCCORMICK 90 Tablet 1    Continuous Glucose Sensor (FREESTYLE BALDOMERO 3 SENSOR) Misc 1 Each every 14 days. 6 Each 3    Continuous Glucose  (FREESTYLE BALDOMERO 3 READER) Device 1 Each every day. 1 Each 0    levothyroxine (SYNTHROID) 88 MCG Tab Take 1 Tablet by mouth every morning on an empty stomach. 90 Tablet 3    FARXIGA 5 MG Tab Take 1 Tablet by mouth every day. Por diabetes 90 Tablet 3    glucose blood (ACCU-CHEK GUIDE) strip USE ONE COVERED STRIP TO TEST BLOOD SUGAR THREE TIMES DAILY. 100 Strip 3    Lancets Use one covered lancet to test blood sugar three times daily. 100 Each 3    atorvastatin (LIPITOR) 20 MG Tab TAKE 1 TABLET BY MOUTH EVERY DAY IN THE EVENING 90 Tablet 3    mycophenolate (CELLCEPT) 250 MG Cap Take 1 Capsule by mouth 2 times a day. 20 Capsule 0    predniSONE (DELTASONE) 5 MG Tab Take 5 mg by mouth every day.       No current facility-administered medications on file prior to visit.       Allergies:   No Known Allergies    Social Hx:   Social History     Socioeconomic History    Marital status:      Spouse name: Not on file    Number of children: Not on file    Years of education: Not on file    Highest education level: Not on file   Occupational History    Not on file   Tobacco Use    Smoking status: Never    Smokeless tobacco: Never   Vaping Use    Vaping status: Never Used   Substance and Sexual Activity    Alcohol use: No     Alcohol/week: 0.0 oz    Drug use: No    Sexual activity: Not Currently   Other Topics Concern    Not  on file   Social History Narrative    Not on file     Social Drivers of Health     Financial Resource Strain: Low Risk  (10/18/2024)    Overall Financial Resource Strain (CARDIA)     Difficulty of Paying Living Expenses: Not very hard   Food Insecurity: No Food Insecurity (10/18/2024)    Hunger Vital Sign     Worried About Running Out of Food in the Last Year: Never true     Ran Out of Food in the Last Year: Never true   Transportation Needs: No Transportation Needs (10/18/2024)    PRAPARE - Transportation     Lack of Transportation (Medical): No     Lack of Transportation (Non-Medical): No   Physical Activity: Inactive (4/9/2024)    Exercise Vital Sign     Days of Exercise per Week: 0 days     Minutes of Exercise per Session: 0 min   Stress: No Stress Concern Present (4/9/2024)    [employer] - Occupational Stress Questionnaire     Feeling of Stress : Only a little   Social Connections: Moderately Isolated (4/9/2024)    Social Connection and Isolation Panel [NHANES]     Frequency of Communication with Friends and Family: More than three times a week     Frequency of Social Gatherings with Friends and Family: More than three times a week     Attends [employer] Services: Never     Active Member of Clubs or Organizations: No     Attends Club or Organization Meetings: Never     Marital Status:    Intimate Partner Violence: Not At Risk (10/15/2024)    Humiliation, Afraid, Rape, and Kick questionnaire     Fear of Current or Ex-Partner: No     Emotionally Abused: No     Physically Abused: No     Sexually Abused: No   Housing Stability: Low Risk  (10/18/2024)    Housing Stability Vital Sign     Unable to Pay for Housing in the Last Year: No     Number of Times Moved in the Last Year: 1     Homeless in the Last Year: No         EXAMINATION     Physical Exam:   Vitals: /47 (BP Location: Right arm, Patient Position: Sitting, BP Cuff Size: Adult)   Pulse (!) 47   Temp 36.6 °C (97.8  "°F) (Temporal)   Ht 1.6 m (5' 3\")   Wt 56.2 kg (123 lb 14.4 oz)   SpO2 98%     Constitutional:   Body Habitus: Body mass index is 21.95 kg/m².  Cooperation: Fully cooperates with exam  Appearance: Well-groomed, well-nourished  Respiratory:  Breathing comfortable on room air, no audible wheezing  Cardiovascular: Skin appears well-perfused  Psychiatric: Appropriate affect  Gait: Slow gait, no use of ambulatory device, nonantalgic.     Neurologic:  Strength:    Bilateral LE 5/5 in hip flexors, knee extensors and flexors, ankle dorsiflexors and plantarflexors      MEDICAL DECISION MAKING    DATA    Labs:   Lab Results   Component Value Date/Time    SODIUM 139 01/04/2025 08:11 AM    POTASSIUM 4.9 01/04/2025 08:11 AM    CHLORIDE 104 01/04/2025 08:11 AM    CO2 23 01/04/2025 08:11 AM    GLUCOSE 112 (H) 01/04/2025 08:11 AM    BUN 37 (H) 01/04/2025 08:11 AM    CREATININE 1.73 (H) 01/04/2025 08:11 AM    BUNCREATRAT 8 (L) 01/28/2021 07:00 AM        Lab Results   Component Value Date/Time    PROTHROMBTM 13.3 07/28/2023 11:10 AM    INR 0.98 07/28/2023 11:10 AM        Lab Results   Component Value Date/Time    WBC 5.5 01/04/2025 08:11 AM    RBC 5.09 01/04/2025 08:11 AM    HEMOGLOBIN 14.0 01/04/2025 08:11 AM    HEMATOCRIT 45.4 01/04/2025 08:11 AM    MCV 89.2 01/04/2025 08:11 AM    MCH 27.5 01/04/2025 08:11 AM    MCHC 30.8 (L) 01/04/2025 08:11 AM    MPV 11.5 01/04/2025 08:11 AM    NEUTSPOLYS 83.00 (H) 01/04/2025 08:11 AM    LYMPHOCYTES 7.80 (L) 01/04/2025 08:11 AM    MONOCYTES 7.60 01/04/2025 08:11 AM    EOSINOPHILS 0.70 01/04/2025 08:11 AM    BASOPHILS 0.50 01/04/2025 08:11 AM    HYPOCHROMIA 1+ 10/21/2024 07:08 AM    ANISOCYTOSIS 1+ 02/28/2023 07:31 AM        Lab Results   Component Value Date/Time    HBA1C 9.5 (A) 06/12/2024 09:30 AM          Imaging:   I personally reviewed the following imaging and below is my independent assessment:  MRI Lumbar Spine 12/19/24:  L4-5 mild canal, mild right and mild to moderate left " subarticular and mild left foraminal stenosis.      I reviewed the following imaging reports:     MRI Lumbar Spine 24:  FINDINGS:  There is mild lumbar levoscoliosis. There is no pathologic marrow infiltration. There is no focal aggressive osseous lesion. There is no perivertebral, disc or epidural fluid collection.     At the level of L5-S1,there is mild diffuse disc bulge. There is mild facet joint arthropathy. There is no spinal or neural foraminal stenosis.     At the level of L4-5,there is minimal disc bulge. There is no spinal or neural foraminal stenosis.     At the level of L3-4,there is mild diffuse disc bulge. There is no spinal or neural foraminal stenosis     At the level of the L2-3, there is no spinal or neural foraminal stenosis.     At the level of L1-2, there is minimal disc bulge without significant spinal or neural foraminal stenosis.     The conus terminates at the level of L1. There is no lumbar intradural lesion.     Right iliac transplanted kidney is seen.     There is atrophied right native kidney. There is well-defined T1 hyperintensity in the right kidney likely representing a complicated cyst.     IMPRESSION:     Mild degenerative disease as described above.                                  DIAGNOSIS   Visit Diagnoses     ICD-10-CM   1. Lumbar radiculopathy  M54.16   2. Type 2 diabetes mellitus with stage 4 chronic kidney disease, unspecified whether long term insulin use ([facility])  E11.22    N18.4   3. -donor kidney transplant recipient  Z94.0   4. Chronic heart failure with preserved ejection fraction ([facility])  I50.32   5. Chronic left-sided low back pain with left-sided sciatica  M54.42    G89.29           ASSESSMENT and PLAN:     Tere Gallegos is a 73 y.o. female who returns to clinic for follow-up of low back and leg pain.    Tere was seen today for follow-up.    Diagnoses and all orders for this visit:    Lumbar radiculopathy    Type 2 diabetes mellitus with  stage 4 chronic kidney disease, unspecified whether long term insulin use (HCC)  -     HEMOGLOBIN A1C; Future    -donor kidney transplant recipient    Chronic heart failure with preserved ejection fraction (HCC)    Chronic left-sided low back pain with left-sided sciatica      Patient presents for follow-up of chronic moderate to severe left-sided low back pain with radiation into the left thigh and calf without improvement with 6 weeks of physician guided home exercise program.  MRI of the lumbar spine demonstrates left subarticular and canal stenosis at L4-5 and I do think that this lumbar radiculopathy is the most likely source of her symptoms.  We discussed that an option for treatment would be an epidural steroid injection, but that I would need to get clearance from her nephrologist as well as clearance for her to be off of her anticoagulation and so I have sent a message to patient's nephrologist for their recommendation.  Finally we did discuss that given that this would be a steroid injection, it cannot be done if her A1c is greater than 8, and I placed an order for a repeat A1c today.  Unfortunately we are very limited in medications for this patient given her history of CKD 4 and so for now we will continue gabapentin 100 mg nightly for pain in the setting of her lumbar radiculopathy as well as EMG confirmed peripheral polyneuropathy.      Follow up: For left L5 transforaminal epidural steroid injection after clearance from nephrology and clearance to be off anticoagulation, as well as confirmation that A1c is now below 8    Thank you for allowing me to participate in the care of this patient. If you have any questions please not hesitate to contact me.         Please note that this dictation was created using voice recognition software. I have made every reasonable attempt to correct obvious errors but there may be errors of grammar and content that I may have overlooked prior to finalization of  this note.    Dot Matute MD  Interventional Spine and Sports Physiatry  Physical Medicine and Rehabilitation  [facility]

## 2025-01-09 DIAGNOSIS — M54.16 LUMBAR RADICULOPATHY: Primary | ICD-10-CM

## 2025-01-10 ENCOUNTER — ANTICOAGULATION VISIT (OUTPATIENT)
Dept: VASCULAR LAB | Facility: MEDICAL CENTER | Age: 75
End: 2025-01-10
Attending: INTERNAL MEDICINE
Payer: MEDICARE

## 2025-01-10 ENCOUNTER — PHARMACY VISIT (OUTPATIENT)
Dept: PHARMACY | Facility: MEDICAL CENTER | Age: 75
End: 2025-01-10
Payer: COMMERCIAL

## 2025-01-10 VITALS — DIASTOLIC BLOOD PRESSURE: 59 MMHG | RESPIRATION RATE: 47 BRPM | SYSTOLIC BLOOD PRESSURE: 137 MMHG

## 2025-01-10 DIAGNOSIS — I48.0 PAROXYSMAL ATRIAL FIBRILLATION (HCC): ICD-10-CM

## 2025-01-10 DIAGNOSIS — D68.69 SECONDARY HYPERCOAGULABLE STATE (HCC): ICD-10-CM

## 2025-01-10 PROCEDURE — RXMED WILLOW AMBULATORY MEDICATION CHARGE: Performed by: INTERNAL MEDICINE

## 2025-01-10 PROCEDURE — 99212 OFFICE O/P EST SF 10 MIN: CPT

## 2025-01-11 NOTE — PROGRESS NOTES
Target end date: Indefinite   Indication: AF  Drug: Eliquis 2.5 mg BID  CHADsVASC = 5 (HTN, DM, CHF, age, female)   HAS-BLED = 2 (HTN, age)    Health Status Since Last Assessment  Pt presented the ER 10/14/2024 after last apt for weakness and dizziness. Pt found to be in Afib RVR w/ hyperkalemia.     Otherwise no health concerns.     Adherence with DOAC Therapy  Pt HAS missed doses in the average week.   Pt reported not taking medication for 1 week due to issues with obtaining medication at pharmacy. Counseled patient on importance of not missing doses and provided samples. Sent new prescription, pt family member will inform us if there are still issues obtaining.     Bleeding Risk Assessment  Pt denies epistaxis  Pt denies any hematuria  Pt denies any excessive or unusual bleeding/hematomas.  Pt denies any GI bleeds or hematemesis.  Pt denies any concerning daily headache or subdural hematoma symptoms.    Latest Hemoglobin: WNL  Hemoglobin   Date Value Ref Range Status   01/04/2025 14.0 12.0 - 16.0 g/dL Final     Latest Platelets:  Chronically low, around baseline  Platelet Count   Date Value Ref Range Status   01/04/2025 108 (L) 164 - 446 K/uL Final       Pt denies  ETOH overuse     Creatinine Clearance/Renal Function  Latest CrCl: 24.7 ml/min    Hepatic function  Latest LFTs: WNL  Pt denies any history of liver dysfunction        Drug Interactions  ASA/other antiplatelets: None  NSAID: None  Other drug interactions: None  Verified no anticonvulsant or azole therapy, education provided for future use.     Examination  Blood Pressure WNL  There were no vitals filed for this visit.  Symptomatic hypotension: Denies   Significant gait impairment/imbalance/high risk for falls? None seen     Final Assessment and Recommendations:  Patient appears stable from the anticoagulation standpoint.    Benefits of continued DOAC therapy outweigh risks for this patient  Recommend pt continue with current DOAC therapy: Eliquis 2.5  mg BID    DOAC is affordable, when able to obtain.      Other Actions: CMP/CBC hemogram ordered prior to next visit    Follow up:  Will follow up with patient 3 month(s).     Carmen Ramirez, DominiqueD

## 2025-01-15 ENCOUNTER — OFFICE VISIT (OUTPATIENT)
Dept: ENDOCRINOLOGY | Facility: MEDICAL CENTER | Age: 75
End: 2025-01-15
Payer: MEDICARE

## 2025-01-15 VITALS
WEIGHT: 123 LBS | HEIGHT: 60 IN | SYSTOLIC BLOOD PRESSURE: 130 MMHG | HEART RATE: 50 BPM | BODY MASS INDEX: 24.15 KG/M2 | OXYGEN SATURATION: 98 % | DIASTOLIC BLOOD PRESSURE: 50 MMHG

## 2025-01-15 DIAGNOSIS — N18.6 ESRD (END STAGE RENAL DISEASE) (HCC): ICD-10-CM

## 2025-01-15 DIAGNOSIS — I10 ESSENTIAL HYPERTENSION: ICD-10-CM

## 2025-01-15 DIAGNOSIS — E03.9 HYPOTHYROIDISM, ACQUIRED: ICD-10-CM

## 2025-01-15 DIAGNOSIS — I25.10 CARDIOVASCULAR DISEASE: ICD-10-CM

## 2025-01-15 DIAGNOSIS — E11.69 TYPE 2 DIABETES MELLITUS WITH OTHER SPECIFIED COMPLICATION, WITH LONG-TERM CURRENT USE OF INSULIN (HCC): ICD-10-CM

## 2025-01-15 DIAGNOSIS — Z79.4 TYPE 2 DIABETES MELLITUS WITH OTHER SPECIFIED COMPLICATION, WITH LONG-TERM CURRENT USE OF INSULIN (HCC): ICD-10-CM

## 2025-01-15 DIAGNOSIS — E55.9 VITAMIN D DEFICIENCY: ICD-10-CM

## 2025-01-15 DIAGNOSIS — N25.81 SECONDARY HYPERPARATHYROIDISM (HCC): ICD-10-CM

## 2025-01-15 DIAGNOSIS — G62.9 POLYNEUROPATHY: ICD-10-CM

## 2025-01-15 DIAGNOSIS — E78.5 DYSLIPIDEMIA: ICD-10-CM

## 2025-01-15 PROCEDURE — 3075F SYST BP GE 130 - 139MM HG: CPT

## 2025-01-15 PROCEDURE — 3078F DIAST BP <80 MM HG: CPT

## 2025-01-15 PROCEDURE — 99215 OFFICE O/P EST HI 40 MIN: CPT

## 2025-01-15 PROCEDURE — 99211 OFF/OP EST MAY X REQ PHY/QHP: CPT

## 2025-01-15 RX ORDER — INSULIN GLARGINE 100 [IU]/ML
10 INJECTION, SOLUTION SUBCUTANEOUS 2 TIMES DAILY
Qty: 20 ML | Refills: 3 | Status: SHIPPED | OUTPATIENT
Start: 2025-01-15

## 2025-01-15 ASSESSMENT — FIBROSIS 4 INDEX: FIB4 SCORE: 2.58

## 2025-01-15 NOTE — PROGRESS NOTES
"Chief Complaint: Follow-up on the following conditions  HPI:   Tere Gallegos is a 73 y.o. female    1.  Type 2 diabetes mellitus with hyperglycemia:   Type 2 Diabetes Mellitus diagnosed 20 years ago.      She checks her blood sugars 3x/day  POC A1c on 24 was 8.3  POC A1c on 24 was 8.8  POC A1c on 24 was 9.5  POC A1c on 2024 at 7.2%  POC a1c on 2023 at 6.2%  POC a1c on 2023 6.0%  POC a1c on 2023 at 5.8%  Last A1C on 21 at 5.7%, she was a dialysis patient which makes A1c unreliable.    Diabetes management:  Lantus 10 in am and 10 in pm  Novolg 10 units if BG >200 - 10 units. She has been taking it after eating.    Farxiga 5 mg daily    She reports hypoglycemic episodes in the morning  She states her reader stopped working after 14 days. She does not have her reader today. It is unknown if patient paired her new sensor with reader after replacing the sensor.     She reports having her blood sugars above 200 in the afternoon. She reports taking her mealtime insulin before eating.   Her fasting blood sugars per patient is 130's      Diet: \"healthy\" diet  in general  Exercise: minimal     Diabetes Complications   She  reports history of mild proliferative diabetic retinopathy.    She denies laser eye surgery.   Cataract surgery both eyes were done this year   Last eye exam: apt on       2.  Neuropathy:   Reports numbness or tingling to her feet but reports pain occasionally she is currently taking Lyrica.  she denies history of foot sores.     3.  ESRD  [place] [facility] [place]   Currently taking Losartan and amlodipine 10 mg daily  130/50  Dr. Villar   Latest Reference Range & Units 10/15/24 10:53   Micro Alb Creat Ratio 0 - 30 mg/g see below   Creatinine, Urine mg/dL 18.00   Microalbumin, Urine Random mg/dL <1.2   Urobilinogen, Urine Negative  0.2        Latest Reference Range & Units 25 08:11   Bun 8 - 22 mg/dL 37 " (H)   Creatinine 0.50 - 1.40 mg/dL 1.73 (H)   GFR (CKD-EPI) >60 mL/min/1.73 m 2 30 !      Latest Reference Range & Units 07/19/24 07:29   Potassium 3.6 - 5.5 mmol/L 5.1     4.  Cardiovascular disease:  Paroxysmal Atrial Fibrillation-She is taking Eliquis.   She is seeing at [facility]am[facility]     5.  Hypothyroidism, acquired:   She is currently 88 mcg of levothyroxine daily  She takes it every morning on an empty stomach  Denies taking any iron, calcium, multivitamins, antiacids with the medication    Denies fatigue, constipation, dry skin, palpitations.    Latest Reference Range & Units 10/15/24 07:40   TSH 0.380 - 5.330 uIU/mL 2.060     6. Essential Hypertension:  FV by cardiology   Currently taking Norvasc 10 mg, carvedilol 25 mg, lisinopril 40 mg  BP in office today was 140/50  She has not taken her medications.   denies any dizziness, palpitations, chest pain    7.  Dyslipidemia:  She is currently taking statin-atorvastatin 20 mg daily   She still complaining of muscle aches especially in her lower legs   Latest Reference Range & Units 08/12/24 07:33   Cholesterol,Tot 100 - 199 mg/dL 123   Triglycerides 0 - 149 mg/dL 226 (H)   HDL >=40 mg/dL 42   LDL <100 mg/dL 36     8. Secondary hyperparathyroidism:  Currently not taking any vitamin D  Fv by Dr. Blackmon    Latest Reference Range & Units 01/04/25 08:11   Calcium 8.4 - 10.2 mg/dL 10.3 (H)   Correct Calcium 8.5 - 10.5 mg/dL 10.3      Latest Reference Range & Units 10/15/24 09:44   25-Hydroxy   Vitamin D 25 30 - 100 ng/mL 10 (L)        Latest Reference Range & Units 10/15/24 09:44   Pth, Intact 14.0 - 72.0 pg/mL 89.2 (H)       ROS:     CONS:     No fever, no chills, no weight loss, no fatigue   EYES:      No diplopia, no blurry vision, no redness of eyes, no swelling of eyelids   ENT:    No hearing loss, No ear pain, No sore throat, no dysphagia, no neck swelling   CV:     No chest pain, no palpitations, no  claudication, no orthopnea, no PND   PULM:    No SOB, no cough, no hemoptysis, no wheezing    GI:   No nausea, no vomiting, no diarrhea, no constipation, no bloody stools   :  Passing urine well, no dysuria, no hematuria   ENDO:   No polyuria, no polydipsia, no heat intolerance, no cold intolerance   NEURO: No headaches, no dizziness, no convulsions, no tremors   MUSC:  No joint swellings, no arthralgias, no myalgias, no weakness   SKIN:   No rash, no ulcers, no dry skin   PSYCH:   No depression, no anxiety, no difficulty sleeping       Past Medical History:  Patient Active Problem List    Diagnosis Date Noted    CKD (chronic kidney disease) stage 4, GFR 15-29 ml/min ([facility]) 2024    Hyperglycemia 10/15/2024    Bradycardia 10/15/2024    Hyperkalemia 2024    Vitamin D deficiency 2024    Secondary hyperparathyroidism ([facility]) 2024    Polyneuropathy 2024    Immunosuppression due to drug therapy ([facility]) 12/15/2023    Infection due to ESBL-producing Escherichia coli 2023    Metabolic acidosis 2023    BK viremia 2023    Other hydronephrosis 2023    Long term current use of immunosuppressive drug 2023    Leg swelling 2023    -donor kidney transplant recipient 2022    Lung nodules 10/22/2022    GERD (gastroesophageal reflux disease) 10/19/2022    Normocytic anemia 2022    Secondary hypercoagulable state ([facility]) 2022    Paroxysmal atrial fibrillation ([facility]) 2021    Chronic heart failure with preserved ejection fraction ([facility]) 2020    Hypothyroidism, acquired 2020    Dental disorder 2020    Coronary artery disease with angina pectoris with documented spasm ([facility])     Mixed hyperlipidemia 2019    Elevated troponin 2018    RLS (restless legs syndrome) 2016    Type 2 diabetes mellitus with stage 4 chronic kidney disease, with long-term current use of insulin ([facility]) 2016    Primary hypertension  2013       Past Surgical History:  Past Surgical History:   Procedure Laterality Date    URETERAL REIMPLANTATION  2023    [place] [facility] [place]    OTHER ABDOMINAL SURGERY Right 10/31/2022     Donor kidney transplant - [facility] CARDIAC CATH  2016    RCA stented with 2 Synergy drug-eluting stents.    RECOVERY  2016    Procedure: CATH LAB [facility] WITH POSSIBLE DR. CASTILLO;  Surgeon: Recoveryonly Surgery;  [place] [facility] [place]  Service:     [facility] CARDIAC CATH  2016    100% RCA    OTHER Left 2014    left arm upper extremity fistula    OTHER ABDOMINAL SURGERY      left kidney removed due to cancer        Allergies:  Patient has no known allergies.     Current Medications:    Current Outpatient Medications:     insulin glargine (LANTUS SOLOSTAR) 100 UNIT/ML Solution Pen-injector injection, Inject 10 Units under the skin 2 times a day. 5 pens per month, Disp: 20 mL, Rfl: 3    apixaban (ELIQUIS) 2.5mg Tab, Take 1 Tablet by mouth 2 times a day. TOME 1 TABLETA POR VIA ORAL DOS VECES AL DESMOND, Disp: 60 Tablet, Rfl: 5    apixaban (ELIQUIS) 2.5mg Tab, Take 1 Tablet by mouth 2 times a day., Disp: 14 Tablet, Rfl: 0    ELIQUIS 2.5 MG Tab, TOME 1 TABLETA POR VIA ORAL DOS VECES AL DESMOND (Patient taking differently: Take 2.5 mg by mouth 2 times a day. Indications: Atrial Fibrillation), Disp: 60 Tablet, Rfl: 5    polyethylene glycol 3350 (MIRALAX) 17 GM/SCOOP Powder, Romi bernardo vez al d a seg n sea necesario para el estre imiento, Disp: 238 g, Rfl: 1    tacrolimus (PROGRAF) 1 MG Cap, Take 1 Capsule by mouth 2 times a day., Disp: 30 Capsule, Rfl: 0    furosemide (LASIX) 80 MG Tab, Take 1 Tablet by mouth every day., Disp: 100 Tablet, Rfl: 3    gabapentin (NEURONTIN) 100 MG Cap, Take 1 Capsule by mouth every evening for 90 days., Disp: 90 Capsule, Rfl: 0    acetaminophen (TYLENOL) 500 MG Tab, Take 500-1,000 mg by mouth every 6 hours as needed.,  Disp: , Rfl:     BD PEN NEEDLE PETE 2ND GEN, USE AS DIRECTED WITH INSULIN PENS SIX TIMES DAILY, Disp: 200 Each, Rfl: 3    triamcinolone acetonide (KENALOG) 0.1 % Cream, Aplique bernardo cantidad del tamano de un guisante sobre la piel bernardo vez al fernanda janna sea necesario para la picazon o el sarpullido., Disp: 45 g, Rfl: 2    amLODIPine (NORVASC) 10 MG Tab, TOME BERNARDO TABLETA TODOS LOS MCCORMICK, Disp: 90 Tablet, Rfl: 1    Continuous Glucose Sensor (FREESTYLE BALDOMERO 3 SENSOR) Misc, 1 Each every 14 days., Disp: 6 Each, Rfl: 3    Continuous Glucose  (FREESTYLE BALDOMERO 3 READER) Device, 1 Each every day., Disp: 1 Each, Rfl: 0    levothyroxine (SYNTHROID) 88 MCG Tab, Take 1 Tablet by mouth every morning on an empty stomach., Disp: 90 Tablet, Rfl: 3    FARXIGA 5 MG Tab, Take 1 Tablet by mouth every day. Por diabetes, Disp: 90 Tablet, Rfl: 3    glucose blood (ACCU-CHEK GUIDE) strip, USE ONE COVERED STRIP TO TEST BLOOD SUGAR THREE TIMES DAILY., Disp: 100 Strip, Rfl: 3    Lancets, Use one covered lancet to test blood sugar three times daily., Disp: 100 Each, Rfl: 3    atorvastatin (LIPITOR) 20 MG Tab, TAKE 1 TABLET BY MOUTH EVERY DAY IN THE EVENING, Disp: 90 Tablet, Rfl: 3    mycophenolate (CELLCEPT) 250 MG Cap, Take 1 Capsule by mouth 2 times a day., Disp: 20 Capsule, Rfl: 0    predniSONE (DELTASONE) 5 MG Tab, Take 5 mg by mouth every day., Disp: , Rfl:     Social History:  Social History     Socioeconomic History    Marital status:      Spouse name: Not on file    Number of children: Not on file    Years of education: Not on file    Highest education level: Not on file   Occupational History    Not on file   Tobacco Use    Smoking status: Never    Smokeless tobacco: Never   Vaping Use    Vaping status: Never Used   Substance and Sexual Activity    Alcohol use: No     Alcohol/week: 0.0 oz    Drug use: No    Sexual activity: Not Currently   Other Topics Concern    Not on file   Social History Narrative    Not on file      Social Drivers of Health     Financial Resource Strain: Low Risk  (10/18/2024)    Overall Financial Resource Strain (CARDIA)     Difficulty of Paying Living Expenses: Not very hard   Food Insecurity: No Food Insecurity (10/18/2024)    Hunger Vital Sign     Worried About Running Out of Food in the Last Year: Never true     Ran Out of Food in the Last Year: Never true   Transportation Needs: No Transportation Needs (10/18/2024)    PRAPARE - Transportation     Lack of Transportation (Medical): No     Lack of Transportation (Non-Medical): No   Physical Activity: Inactive (4/9/2024)    Exercise Vital Sign     Days of Exercise per Week: 0 days     Minutes of Exercise per Session: 0 min   Stress: No Stress Concern Present (4/9/2024)    [employer] - Occupational Stress Questionnaire     Feeling of Stress : Only a little   Social Connections: Moderately Isolated (4/9/2024)    Social Connection and Isolation Panel [NHANES]     Frequency of Communication with Friends and Family: More than three times a week     Frequency of Social Gatherings with Friends and Family: More than three times a week     Attends Sikh Services: Never     Active Member of Clubs or Organizations: No     Attends Club or Organization Meetings: Never     Marital Status:    Intimate Partner Violence: Not At Risk (10/15/2024)    Humiliation, Afraid, Rape, and Kick questionnaire     Fear of Current or Ex-Partner: No     Emotionally Abused: No     Physically Abused: No     Sexually Abused: No   Housing Stability: Low Risk  (10/18/2024)    Housing Stability Vital Sign     Unable to Pay for Housing in the Last Year: No     Number of Times Moved in the Last Year: 1     Homeless in the Last Year: No        Family History:   Family History   Problem Relation Age of Onset    Diabetes Sister     Other Sister         liver disease    Diabetes Brother     Heart Disease Neg Hx        PHYSICAL EXAM:   Vital signs: /50  (BP Location: Left arm, Patient Position: Sitting, BP Cuff Size: Adult)   Pulse (!) 50   Ht 1.524 m (5')   Wt 55.8 kg (123 lb)   LMP  (LMP Unknown)   SpO2 98%   BMI 24.02 kg/m²   GENERAL: Well-developed, well-nourished  in no apparent distress.   FOOT: Normal sensation to monofilament testing, normal pulses, no ulcers.  Normal Vibration quantitative sensation test.    Labs:  Lab Results   Component Value Date/Time    HBA1C 5.7 (A) 12/08/2021 1520    AVGLUC 111 04/28/2021 0937     Lab Results   Component Value Date/Time    CHOLSTRLTOT 125 09/29/2020 1056    TRIGLYCERIDE 113 09/29/2020 1056    HDL 48 09/29/2020 1056    LDL 54 09/29/2020 1056       Lab Results   Component Value Date/Time    MALBCRT see below 10/15/2024 10:53 AM    MICROALBUR <1.2 10/15/2024 10:53 AM        Lab Results   Component Value Date/Time    TSHULTRASEN 4.150 08/12/2021 0201     Lab Results   Component Value Date/Time    FREEDIR 1.52 01/28/2021 0700         ASSESSMENT/PLAN:   1. Type 2 diabetes mellitus with other specified complication, with long-term current use of insulin (HCC)  Unstable  A1c 8.3 in clinic today  Extensive discussion on the use of insulin pens. Lantus vs Novolog and when to take them.   Medication:  Lantus 10 units in am and pm - continue  Novolog 10 units - change to novolog 12 units with each meal.   Farxiga 5 mg daily - continue  Extensive discussion with interperter for ángelt to understands how to pair sensor with reader.   Patient and family understands to stay well hydrated to prevent UTIs and yeast infections with farxiga  Recommend diet low in fats and carbs  Recommend 150mins/activity weeky  Recommend checking feet daily  - insulin glargine (LANTUS SOLOSTAR) 100 UNIT/ML Solution Pen-injector injection; Inject 10 Units under the skin 2 times a day. 5 pens per month  Dispense: 20 mL; Refill: 3  - Comp Metabolic Panel; Future    2. Dyslipidemia  Unstable  Continue regimen-HPI  - Lipid Profile; Future    3.  Polyneuropathy  Stable  Continue appointment-HPI    4. ESRD (end stage renal disease) (HCC)  Unstable  Followed by nephrology  - MICROALBUMIN CREAT RATIO URINE; Future    5. Essential hypertension  Stable  Continue current regimen- see HPI  Followed by cardiology    6. Hypothyroidism, acquired  Stable  Medication:  Levothyroxine 88 mcg daily - continue  Patient understands to take the medication on empty stomach.   - TSH; Future  - FREE THYROXINE; Future    7. Secondary hyperparathyroidism (HCC)  This is due to ESRD.   We will continue to monitor.     8. Vitamin D deficiency  Unstable  This is followed by nephrology  - VITAMIN D,25 HYDROXY (DEFICIENCY); Future    9. Cardiovascular disease  Stable  Follow-up cardiology                                         Disposition: Return in about 3 months (around 4/15/2025).   Do blood work 2 week prior to next appointment with me    I spent approximately 50 minutes with the patient face-to-face more than 50% of which time was spent on counseling on the diagnosis and treatment and review of her risks and prognosis.  I discussed current status, physical exam findings, and plan of care.  Answered all of her questions.  The patient understands and agrees with the treatment plan.   Thank you kindly for allowing me to participate in the diabetes care plan for this patient.    ANGELITA Avalos  1/15/25    CC:   ANGELITA Concepcion

## 2025-01-16 DIAGNOSIS — Z94.0 KIDNEY TRANSPLANT RECIPIENT: Chronic | ICD-10-CM

## 2025-01-17 ENCOUNTER — TELEPHONE (OUTPATIENT)
Dept: NEPHROLOGY | Facility: MEDICAL CENTER | Age: 75
End: 2025-01-17
Payer: MEDICARE

## 2025-01-17 RX ORDER — TACROLIMUS 1 MG/1
1 CAPSULE ORAL 2 TIMES DAILY
Qty: 60 CAPSULE | Refills: 11 | Status: SHIPPED | OUTPATIENT
Start: 2025-01-17

## 2025-01-18 NOTE — TELEPHONE ENCOUNTER
Pt was told to schedule fv to see you this month she isnt due till April from our recall list according to pcp her kidney labs went down and that is why appoint was scheduled, please review labs pt had done to determine if pt needs to be seen sooner rather than April

## 2025-01-22 ENCOUNTER — OFFICE VISIT (OUTPATIENT)
Dept: MEDICAL GROUP | Facility: MEDICAL CENTER | Age: 75
End: 2025-01-22
Payer: MEDICARE

## 2025-01-22 VITALS
OXYGEN SATURATION: 95 % | DIASTOLIC BLOOD PRESSURE: 58 MMHG | SYSTOLIC BLOOD PRESSURE: 130 MMHG | HEART RATE: 44 BPM | TEMPERATURE: 98.1 F | WEIGHT: 120 LBS | BODY MASS INDEX: 24.19 KG/M2 | HEIGHT: 59 IN

## 2025-01-22 DIAGNOSIS — N18.4 TYPE 2 DIABETES MELLITUS WITH STAGE 4 CHRONIC KIDNEY DISEASE, WITH LONG-TERM CURRENT USE OF INSULIN (HCC): ICD-10-CM

## 2025-01-22 DIAGNOSIS — Z79.4 TYPE 2 DIABETES MELLITUS WITH STAGE 4 CHRONIC KIDNEY DISEASE, WITH LONG-TERM CURRENT USE OF INSULIN (HCC): ICD-10-CM

## 2025-01-22 DIAGNOSIS — E11.22 TYPE 2 DIABETES MELLITUS WITH STAGE 4 CHRONIC KIDNEY DISEASE, WITH LONG-TERM CURRENT USE OF INSULIN (HCC): ICD-10-CM

## 2025-01-22 DIAGNOSIS — M54.16 LUMBAR RADICULOPATHY: ICD-10-CM

## 2025-01-22 DIAGNOSIS — N18.4 CKD (CHRONIC KIDNEY DISEASE) STAGE 4, GFR 15-29 ML/MIN (HCC): ICD-10-CM

## 2025-01-22 DIAGNOSIS — I48.0 PAROXYSMAL ATRIAL FIBRILLATION (HCC): ICD-10-CM

## 2025-01-22 PROCEDURE — 99214 OFFICE O/P EST MOD 30 MIN: CPT | Performed by: NURSE PRACTITIONER

## 2025-01-22 PROCEDURE — 3078F DIAST BP <80 MM HG: CPT | Performed by: NURSE PRACTITIONER

## 2025-01-22 PROCEDURE — 3075F SYST BP GE 130 - 139MM HG: CPT | Performed by: NURSE PRACTITIONER

## 2025-01-22 ASSESSMENT — FIBROSIS 4 INDEX: FIB4 SCORE: 2.58

## 2025-01-22 NOTE — PROGRESS NOTES
Subjective:     History of Present Illness  The patient presents for a follow-up after her last visit. She is accompanied by her son. A  is in use     services were used in the patient's primary language of Moldovan.     Name or Number: Beatriz  Mode of interpretation: iPad    Content of Interpretation:       She has been under the care of a pain specialist for her back and leg discomfort. The specialist has recommended an epidural injection, contingent upon her A1C levels being below 8, currently 8.3. She has a scheduled appointment with Dr. Villar, nephrology, tomorrow for clearance for this procedure. Additionally, she has a follow-up appointment with her pain specialist in 3 weeks for injection.    Recently, she consulted with a diabetes nurse practitioner who made minor adjustments to her insulin regimen. She reports that her blood glucose levels have decreased since the adjustment of her insulin dosage. She has been monitoring her blood glucose levels at home but has not been utilizing her continuous glucose monitor due to technical issues. She still has the device at home. She reports no adverse effects from her current medication regimen. Her NovoLog insulin dosage was increased from 10 to 12 units AC per APRN note, however patient was under the impression that it was increased to 15 units AC , and she also takes Lantus 10 units in the morning and at night.        Current medicines (including changes today)  Current Outpatient Medications   Medication Sig Dispense Refill    tacrolimus (PROGRAF) 1 MG Cap TAKE 1 CAPSULE BY MOUTH TWICE DAILY 60 Capsule 11    insulin glargine (LANTUS SOLOSTAR) 100 UNIT/ML Solution Pen-injector injection Inject 10 Units under the skin 2 times a day. 5 pens per month 20 mL 3    apixaban (ELIQUIS) 2.5mg Tab Take 1 Tablet by mouth 2 times a day. TOME 1 TABLETA POR VIA ORAL DOS VECES AL DESMOND 60 Tablet 5    apixaban (ELIQUIS) 2.5mg Tab Take 1  Tablet by mouth 2 times a day. 14 Tablet 0    ELIQUIS 2.5 MG Tab TOME 1 TABLETA POR VIA ORAL DOS VECES AL DESMOND (Patient taking differently: Take 2.5 mg by mouth 2 times a day. Indications: Atrial Fibrillation) 60 Tablet 5    polyethylene glycol 3350 (MIRALAX) 17 GM/SCOOP Powder Romi bernardo vez al d a seg n sea necesario para el estre imiento 238 g 1    furosemide (LASIX) 80 MG Tab Take 1 Tablet by mouth every day. 100 Tablet 3    gabapentin (NEURONTIN) 100 MG Cap Take 1 Capsule by mouth every evening for 90 days. 90 Capsule 0    acetaminophen (TYLENOL) 500 MG Tab Take 500-1,000 mg by mouth every 6 hours as needed.      BD PEN NEEDLE PETE 2ND GEN USE AS DIRECTED WITH INSULIN PENS SIX TIMES DAILY 200 Each 3    triamcinolone acetonide (KENALOG) 0.1 % Cream Aplique bernardo cantidad del tamano de un guisante sobre la piel bernardo vez al desmond janna sea necesario para la picazon o el sarpullido. 45 g 2    amLODIPine (NORVASC) 10 MG Tab TOME BERNARDO TABLETA TODOS LOS MCCORMICK 90 Tablet 1    Continuous Glucose Sensor (FREESTYLE BALDOMERO 3 SENSOR) Misc 1 Each every 14 days. 6 Each 3    Continuous Glucose  (FREESTYLE BALDOMERO 3 READER) Device 1 Each every day. 1 Each 0    levothyroxine (SYNTHROID) 88 MCG Tab Take 1 Tablet by mouth every morning on an empty stomach. 90 Tablet 3    FARXIGA 5 MG Tab Take 1 Tablet by mouth every day. Por diabetes 90 Tablet 3    glucose blood (ACCU-CHEK GUIDE) strip USE ONE COVERED STRIP TO TEST BLOOD SUGAR THREE TIMES DAILY. 100 Strip 3    Lancets Use one covered lancet to test blood sugar three times daily. 100 Each 3    atorvastatin (LIPITOR) 20 MG Tab TAKE 1 TABLET BY MOUTH EVERY DAY IN THE EVENING 90 Tablet 3    mycophenolate (CELLCEPT) 250 MG Cap Take 1 Capsule by mouth 2 times a day. 20 Capsule 0    predniSONE (DELTASONE) 5 MG Tab Take 5 mg by mouth every day.       No current facility-administered medications for this visit.     She  has a past medical history of Acquired hypothyroidism (05/04/2020), CAD  "(coronary artery disease), Chronic diastolic heart failure ([facility]) (05/04/2020), Coronary artery disease due to lipid rich plaque, Dental disorder, Diabetes ([facility]), ESRD (end stage renal disease) on dialysis ([facility]) (05/04/2020), Hemodialysis patient ([facility]), Hyperlipidemia, Hypertension, Kidney transplant candidate, Kidney transplant recipient (10/31/2022), Presence of drug-eluting stent in right coronary artery, QT prolongation (01/22/2020), RLS (restless legs syndrome) (08/05/2016), and Transaminitis (12/22/2018).    ROS   No chest pain, no shortness of breath, no abdominal pain  Positive ROS as per HPI.  All other systems reviewed and are negative.     Objective:     /58   Pulse (!) 44   Temp 36.7 °C (98.1 °F) (Temporal)   Ht 1.499 m (4' 11\")   Wt 54.4 kg (120 lb)   SpO2 95%  Body mass index is 24.24 kg/m².   Physical Exam    Constitutional: Alert, no distress.  Skin: Warm, dry, good turgor, no rashes in visible areas.  Eye: Equal, round and reactive, conjunctiva clear, lids normal.  ENMT: Lips without lesions, good dentition  Respiratory: Unlabored respiratory effort  Psych: Alert and oriented x3, normal affect and mood.      Results  Laboratory Studies  A1c was 8.3         Assessment and Plan:   The following treatment plan was discussed    Assessment & Plan  1. Back and leg pain.  She has been advised by her pain doctor to lower her A1c below 8 before proceeding with the epidural injection. She has an appointment with Dr. Ng tomorrow for clearance to get the epidural injection.    2. Diabetes mellitus.  Her blood glucose levels remain slightly elevated, with a recent A1c reading of 8.3. The diabetes nurse practitioner adjusted her insulin regimen, increasing the NovoLog insulin to 12 units with meals and maintaining 10 units in the morning and at night. She is advised to continue monitoring her blood sugar levels at home. The patient will be informed about the clarification of insulin dose.    3. " PAfib  Stable on eliquis, rate is controlled  Continue follow up with cardiology    Follow-up  The patient will follow up in March 2025.      ORDERS:  1. Paroxysmal atrial fibrillation (HCC)    2. Type 2 diabetes mellitus with stage 4 chronic kidney disease, with long-term current use of insulin (HCC)    3. CKD (chronic kidney disease) stage 4, GFR 15-29 ml/min (HCC)    4. Lumbar radiculopathy          The MA is performing the above orders under the direction of Dr. Priest    Please note that this dictation was created using voice recognition software. I have made every reasonable attempt to correct obvious errors, but I expect that there are errors of grammar and possibly content that I did not discover before finalizing the note.      Attestation      Verbal consent was acquired by the patient to use [employer] ambient listening note generation during this visit Yes

## 2025-01-23 ENCOUNTER — TELEPHONE (OUTPATIENT)
Dept: MEDICAL GROUP | Facility: MEDICAL CENTER | Age: 75
End: 2025-01-23
Payer: MEDICARE

## 2025-01-23 DIAGNOSIS — E78.2 MIXED HYPERLIPIDEMIA: ICD-10-CM

## 2025-01-23 RX ORDER — ATORVASTATIN CALCIUM 20 MG/1
TABLET, FILM COATED ORAL
Qty: 100 TABLET | Refills: 3 | Status: SHIPPED | OUTPATIENT
Start: 2025-01-23

## 2025-01-23 NOTE — TELEPHONE ENCOUNTER
Is the patient due for a refill? Yes    Was the patient seen the last 15 months? Yes    Date of last office visit: 11.20.2024    Does the patient have an upcoming appointment?  No      Provider to refill: BE    Does the patient have [facility] and need 100-day supply? (This applies to ALL medications) Yes, quantity updated to 100 days

## 2025-01-24 ENCOUNTER — TELEPHONE (OUTPATIENT)
Dept: CARDIOLOGY | Facility: MEDICAL CENTER | Age: 75
End: 2025-01-24
Payer: MEDICARE

## 2025-01-24 NOTE — TELEPHONE ENCOUNTER
MAs- Could you please complete this surgical clearance. Patient's procedure on 1/28/25. Thank you!!

## 2025-01-24 NOTE — TELEPHONE ENCOUNTER
*Singaporean SPEAKING ONLY*    Clarified insulin dose with Endocrinology NP. Please contact patient and let her know that she is supposed to be taking 15 units of Novolog her mealtime insulin, it was incorrect in the note, she was correct. NOT 12 units.     MALGORZATA Concepcion.

## 2025-01-24 NOTE — TELEPHONE ENCOUNTER
BE    Caller: Barby     Topic/issue: Calling to request that the medication clearance be signed and faxed back to their office in preparation for patient's procedure on 1/28/2025. Clearance is in media tab from today. Please advise.  F: [phone]    Procedure: Epidural injection by Dr. Matute in Physiatry     Procedure date: 1/28/2025    Callback Number: [phone]    Thank you,  Madelin WHITMAN

## 2025-01-24 NOTE — LETTER
PROCEDURE/SURGERY CLEARANCE FORM      Encounter Date: 1/24/2025    Patient: Tere Gallegos  YOB: 1950    CARDIOLOGIST: Damon Crawley    REFERRING DOCTOR:  No ref. provider found    The following procedure/surgery: Left L5-S1 transforaminal epidural steroid injection with fluoroscopic guidance                                                      Electronically signed    MD Jasmine Crawley M.D.    PROCEDURE/SURGERY CLEARANCE FORM    Date: 1/24/2025   Patient Name: Tere Gallegos    Dear Surgeon or Proceduralist,      Thank you for your request for cardiac stratification of our mutual patient Tere Gallegos 1950. We have reviewed their [facility] records; and to the best of our understanding this patient has not had stenting, ablation, watchman, cardiothoracic surgery or hospitalization for cardiovascular reasons in the past 6 months.  Tere Gallegos has been seen within the past 15 months and is considered to have non-modifiable cardiac risk for this low-risk procedure/surgery. They may proceed from a cardiovascular standpoint and may hold their antiplatelet/anticoagulation as briefly as possible. Please have patient resume this medication when hemodynamically stable to do so.     Aspirin or Prasugrel   - hold 7 days prior to procedure/surgery, resume when hemodynamically stable      Clopidrogrel or Ticagrelor  - hold 7 days for all neurological procedures, hold 5 days prior to all other procedure/surgery,  resume when hemodynamically stable     Warfarin - hold 7 days for all neurological procedures, hold 5 days prior to all other procedure/surgery and coordinate with [facility] ([phone]) INR testing and dose management.      Pradaxa/Xarelto/Eliquis/Savesya - hold 1 day prior to procedure for low bleeding risk procedure, 2 days for high bleeding risk procedure, or consider holding 3 days or longer for patients with  reduced kidney function (CrCl <30mL/min) or spinal/cranial surgeries/procedures.      If they have a mechanical heart valve, please coordinate with [facility] ([phone]) the proper management of their anticoagulant in the periprocedural or perioperative period.      Some patients have higher risk for cardiovascular complications or holding medication. If our patient has had prior complications of holding antiplatelet or anticoagulants in the past and we have seen them after these events, we have addressed these concerns with the patient. They are at an unknown degree of increased risk for recurrent complication.  You may hold anticoagulation/antiplatelets for the procedure or surgery if the benefits of the procedure or surgery outweigh this nonmodifiable risk.      If Tere Gallegos 1950 has new symptoms of heart failure decompensation, unstable arrythmia, or angina please reach out and we will assess the patient.      If you have other patient-specific concerns, please feel free to reach out to the patient's cardiologist directly at [phone].     Thank you,       [facility]

## 2025-01-25 NOTE — TELEPHONE ENCOUNTER
Last OV: 11/20/24  Proposed Surgery: Left L5-S1 transforaminal epidural steroid injection with fluoroscopic guidance   Surgery Date: 1/28/25  Requesting Office Name: [facility]  Fax Number: [phone]  Preference of Location (default is surgery center unless specified by Cardiologist or BETTE)  Prior Clearance Addressed: No    Is this a general clearance? YES   Anticoags/Antiplatelets: Apixaban   Anticoags/Antiplatelet managed by Cardiology? YES    Outstanding Cardiac Imaging : No  Ablation, Cardioversion, Stent, Cardiac Devices, Catheterization, Watchman: No  TAVR/Valve, Mitral Clip, Watchman (including open heart),: N/A   Recent Cardiac Hospitalization: No            When: Greater than 3 months since hospitalization   History (cardiac history):   Past Medical History:   Diagnosis Date    Acquired hypothyroidism 05/04/2020    CAD (coronary artery disease)     Chronic diastolic heart failure ([facility]) 05/04/2020    Coronary artery disease due to lipid rich plaque     2 Synergy NOEMI to 100% RCA stent placed    Dental disorder     partial dentures- uppers    Diabetes ([facility])     oral medication    ESRD (end stage renal disease) on dialysis ([facility]) 05/04/2020    Hemodialysis patient ([facility])     M, W, F    Hyperlipidemia     Hypertension     Kidney transplant candidate     Kidney transplant recipient 10/31/2022    Presence of drug-eluting stent in right coronary artery     QT prolongation 01/22/2020    RLS (restless legs syndrome) 08/05/2016    Transaminitis 12/22/2018           Is this a dental clearance? NO  Ablation, Cardioversion, Watchman, Stents, Cath, Devices within the last 3 months? No   If yes- Send dental wait letter, do not forward to provider for review.     TAVR / Valve, Mitral clip within the last 6 months? No  If yes- Send dental wait letter, do not forward to provider for review.     If completing a general clearance, continue per protocol.           Surgical Clearance Letter Sent: YES   **Scan  clearance request letter into [facility].**

## 2025-02-11 ENCOUNTER — APPOINTMENT (OUTPATIENT)
Dept: RADIOLOGY | Facility: REHABILITATION | Age: 75
End: 2025-02-11
Attending: STUDENT IN AN ORGANIZED HEALTH CARE EDUCATION/TRAINING PROGRAM
Payer: MEDICARE

## 2025-02-11 ENCOUNTER — HOSPITAL ENCOUNTER (OUTPATIENT)
Facility: REHABILITATION | Age: 75
End: 2025-02-11
Attending: STUDENT IN AN ORGANIZED HEALTH CARE EDUCATION/TRAINING PROGRAM | Admitting: STUDENT IN AN ORGANIZED HEALTH CARE EDUCATION/TRAINING PROGRAM
Payer: MEDICARE

## 2025-02-11 RX ORDER — DEXAMETHASONE SODIUM PHOSPHATE 10 MG/ML
INJECTION, SOLUTION INTRAMUSCULAR; INTRAVENOUS
Status: COMPLETED
Start: 2025-02-11 | End: 2025-02-11

## 2025-02-11 RX ORDER — LIDOCAINE HYDROCHLORIDE 10 MG/ML
INJECTION, SOLUTION EPIDURAL; INFILTRATION; INTRACAUDAL; PERINEURAL
Status: COMPLETED
Start: 2025-02-11 | End: 2025-02-11

## 2025-02-11 NOTE — CARE PLAN
Continue Lipitor 80 mg daily   The patient is Watcher - Medium risk of patient condition declining or worsening    Shift Goals  Clinical Goals: Transfer to [facility] [place]  Patient Goals: rest, comfort    Progress made toward(s) clinical / shift goals:  Patient converted from sinus rhythm to afib RVR with rate in the 130-140's touching up to 200. Patient states that she does feel palpitations. Received new prn order for HR > 120. Patient still awaiting bed and transfer to [facility] [place] (pending weather reassessment).       Problem: Pain - Standard  Goal: Alleviation of pain or a reduction in pain to the patient’s comfort goal  Outcome: Progressing     Problem: Knowledge Deficit - Standard  Goal: Patient and family/care givers will demonstrate understanding of plan of care, disease process/condition, diagnostic tests and medications  Outcome: Progressing       Patient is not progressing towards the following goals:

## 2025-02-11 NOTE — PROGRESS NOTES
1454 Pt received to pre procedure area. ID band and allergies verified. Pt seen by Dr. Matute,  procedure discussed, questions answered using . Pt denies pain in legs. After discussion procedure was cancelled by Dr. Matute and pt instructed to follow up in her office to discuss options for treatment.

## 2025-02-11 NOTE — PROGRESS NOTES
Verbal consent was acquired by the patient to use MATTInnotasot ambient listening note generation during this visit Yes     FOLLOW-UP PATIENT VISIT    Interventional Spine and Pain  Physiatry (Physical Medicine and Rehabilitation)     Date of Service: 25   Chief Complaint: No chief complaint on file.     Patient Name: Tere Gallegos   : 1950   MRN: [id number]       PRIOR HISTORY    Please see new patient note by Dr. Matute for more details.     Interval History  Patient identification: Tere Gallegos,  1950, with history of ESRD s/p kidney transplant on immunosuppression, CAD, chronic heart failure, T2DM (last A1C 9.5 on 24), afib on apixaban, GERD, who presents today for follow-up of ***. She was scheduled to undergo a left L5-S1 transforaminal epidural steroid injection yesterday, but she reported that her left leg pain was almost completely resolved and so that procedure was cancelled.    History of Present Illness         Red Flags ROS: ***  Fever, Chills, Sweats: Denies  Involuntary Weight Loss: Denies  Bladder Incontinence: Denies  Bowel Incontinence: Denies  Saddle Anesthesia: Denies      PMHx:   Past Medical History:   Diagnosis Date    Acquired hypothyroidism 2020    CAD (coronary artery disease)     Chronic diastolic heart failure ([facility]) 2020    Coronary artery disease due to lipid rich plaque     2 Synergy NOEMI to 100% RCA stent placed    Dental disorder     partial dentures- uppers    Diabetes ([facility])     oral medication    ESRD (end stage renal disease) on dialysis ([facility]) 2020    Hemodialysis patient ([facility])     M, W, F    Hyperlipidemia     Hypertension     Kidney transplant candidate     Kidney transplant recipient 10/31/2022    Presence of drug-eluting stent in right coronary artery     QT prolongation 2020    RLS (restless legs syndrome) 2016    Transaminitis 2018       PSHx:   Past Surgical History:   Procedure Laterality  Date    [place]TERAL RE[place]PL[place]ATION  2023    [place] [facility] [place]    [place]BDOMINAL SURGERY Right 10/31/2022     Donor kidney transplant - [facility] CARDIAC CATH  2016    RCA stented with 2 Synergy drug-eluting stents.    RECOVERY  2016    Procedure: CATH LAB [facility] WITH POSSIBLE DR. CASTILLO;  Surgeon: Recoveryonly Surgery;  Location: SURGERY PRE-POST PROC UNIT [facility];  Service:     [facility] CARDIAC CATH  2016    100% RCA    OTHER Left 2014    left arm upper extremity fistula    OTHER ABDOMINAL SURGERY      left kidney removed due to cancer       Family history   Family History   Problem Relation Age of Onset    Diabetes Sister     Other Sister         liver disease    Diabetes Brother     Heart Disease Neg Hx        Medications:   No outpatient medications have been marked as taking for the 25 encounter (Appointment) with Dot Matute M.D..        Current Facility-Administered Medications on File Prior to Visit   Medication Dose Route Frequency Provider Last Rate Last Admin    DEXAMETHASONE SOD PHOSPHATE PF 10 MG/ML INJ SOLN             LIDOCAINE HCL (PF) 1 % INJ SOLN              Current Outpatient Medications on File Prior to Visit   Medication Sig Dispense Refill    atorvastatin (LIPITOR) 20 MG Tab TOME 1 TABLETA POR VIA ORAL TODOS LOS MCCORMICK EN LA NOCHE 100 Tablet 3    tacrolimus (PROGRAF) 1 MG Cap TAKE 1 CAPSULE BY MOUTH TWICE DAILY 60 Capsule 11    insulin glargine (LANTUS SOLOSTAR) 100 UNIT/ML Solution Pen-injector injection Inject 10 Units under the skin 2 times a day. 5 pens per month 20 mL 3    apixaban (ELIQUIS) 2.5mg Tab Take 1 Tablet by mouth 2 times a day. TOME 1 TABLETA POR VIA ORAL DOS VECES AL DESMOND 60 Tablet 5    apixaban (ELIQUIS) 2.5mg Tab Take 1 Tablet by mouth 2 times a day. 14 Tablet 0    ELIQUIS 2.5 MG Tab TOME 1 TABLETA POR VIA ORAL DOS VECES AL DESMOND (Patient taking differently: Take 2.5 mg by mouth 2 times a day.  Indications: Atrial Fibrillation) 60 Tablet 5    polyethylene glycol 3350 (MIRALAX) 17 GM/SCOOP Powder Romi bernardo vez al d a seg n sea necesario para el estre imiento 238 g 1    furosemide (LASIX) 80 MG Tab Take 1 Tablet by mouth every day. 100 Tablet 3    gabapentin (NEURONTIN) 100 MG Cap Take 1 Capsule by mouth every evening for 90 days. 90 Capsule 0    acetaminophen (TYLENOL) 500 MG Tab Take 500-1,000 mg by mouth every 6 hours as needed.      BD PEN NEEDLE PETE 2ND GEN USE AS DIRECTED WITH INSULIN PENS SIX TIMES DAILY 200 Each 3    triamcinolone acetonide (KENALOG) 0.1 % Cream Aplique bernardo cantidad del tamano de un guisante sobre la piel bernardo vez al fernanda janna sea necesario para la picazon o el sarpullido. 45 g 2    amLODIPine (NORVASC) 10 MG Tab TOME BERNARDO TABLETA TODOS LOS MCCORMICK 90 Tablet 1    Continuous Glucose Sensor (FREESTYLE BALDOMERO 3 SENSOR) Misc 1 Each every 14 days. 6 Each 3    Continuous Glucose  (FREESTYLE BALDOMERO 3 READER) Device 1 Each every day. 1 Each 0    levothyroxine (SYNTHROID) 88 MCG Tab Take 1 Tablet by mouth every morning on an empty stomach. 90 Tablet 3    FARXIGA 5 MG Tab Take 1 Tablet by mouth every day. Por diabetes 90 Tablet 3    glucose blood (ACCU-CHEK GUIDE) strip USE ONE COVERED STRIP TO TEST BLOOD SUGAR THREE TIMES DAILY. 100 Strip 3    Lancets Use one covered lancet to test blood sugar three times daily. 100 Each 3    mycophenolate (CELLCEPT) 250 MG Cap Take 1 Capsule by mouth 2 times a day. 20 Capsule 0    predniSONE (DELTASONE) 5 MG Tab Take 5 mg by mouth every day.         Allergies:   No Known Allergies    Social Hx:   Social History     Socioeconomic History    Marital status:      Spouse name: Not on file    Number of children: Not on file    Years of education: Not on file    Highest education level: Not on file   Occupational History    Not on file   Tobacco Use    Smoking status: Never    Smokeless tobacco: Never   Vaping Use    Vaping status: Never Used   Substance  and Sexual Activity    Alcohol use: No     Alcohol/week: 0.0 oz    Drug use: No    Sexual activity: Not Currently   Other Topics Concern    Not on file   Social History Narrative    Not on file     Social Drivers of Health     Financial Resource Strain: Low Risk  (10/18/2024)    Overall Financial Resource Strain (CARDIA)     Difficulty of Paying Living Expenses: Not very hard   Food Insecurity: No Food Insecurity (10/18/2024)    Hunger Vital Sign     Worried About Running Out of Food in the Last Year: Never true     Ran Out of Food in the Last Year: Never true   Transportation Needs: No Transportation Needs (10/18/2024)    PRAPARE - Transportation     Lack of Transportation (Medical): No     Lack of Transportation (Non-Medical): No   Physical Activity: Inactive (4/9/2024)    Exercise Vital Sign     Days of Exercise per Week: 0 days     Minutes of Exercise per Session: 0 min   Stress: No Stress Concern Present (4/9/2024)    [employer] - Occupational Stress Questionnaire     Feeling of Stress : Only a little   Social Connections: Moderately Isolated (4/9/2024)    Social Connection and Isolation Panel [NHANES]     Frequency of Communication with Friends and Family: More than three times a week     Frequency of Social Gatherings with Friends and Family: More than three times a week     Attends Restoration Services: Never     Active Member of Clubs or Organizations: No     Attends Club or Organization Meetings: Never     Marital Status:    Intimate Partner Violence: Not At Risk (10/15/2024)    Humiliation, Afraid, Rape, and Kick questionnaire     Fear of Current or Ex-Partner: No     Emotionally Abused: No     Physically Abused: No     Sexually Abused: No   Housing Stability: Low Risk  (10/18/2024)    Housing Stability Vital Sign     Unable to Pay for Housing in the Last Year: No     Number of Times Moved in the Last Year: 1     Homeless in the Last Year: No         EXAMINATION      Physical Exam:   Vitals: There were no vitals taken for this visit.    Physical Exam         Constitutional:   Body Habitus: There is no height or weight on file to calculate BMI.  Cooperation: Fully cooperates with exam  Appearance: Well-groomed, well-nourished  Respiratory:  Breathing comfortable on room air, no audible wheezing  Cardiovascular: Skin appears well-perfused  Psychiatric: Appropriate affect  Gait: normal gait, no use of ambulatory device, nonantalgic. {ckwalkin}. ***    Neurologic:  Strength:    Bilateral UE 5/5 in shoulder abductors, elbow extensors and flexors, wrist extensors, finger abductors, and finger flexors   Bilateral LE 5/5 in hip flexors, knee extensors and flexors, ankle dorsiflexors and plantarflexors, great toe extensors   Reflexes: 2+ in bilateral patella and achilles (brachioradialis, biceps, triceps)   Sensation: grossly intact bilaterally dermatomes L3-S1 (C5-T1)   MSK:?No?TTP across axial spinous processes and paraspinals bilaterally, has?preserved?active lumbar ROM with pain with ***     Special?tests:    Negative slump test bilaterally   Negative FADIR, log roll bilaterally   Negative thigh thrust, VIPIN bilaterally   Negative facet loading maneuvers bilaterally       MEDICAL DECISION MAKING    DATA    Labs:   Lab Results   Component Value Date/Time    SODIUM 139 2025 08:11 AM    POTASSIUM 4.9 2025 08:11 AM    CHLORIDE 104 2025 08:11 AM    CO2 23 2025 08:11 AM    GLUCOSE 112 (H) 2025 08:11 AM    BUN 37 (H) 2025 08:11 AM    CREATININE 1.73 (H) 2025 08:11 AM    BUNCREATRAT 8 (L) 2021 07:00 AM        Lab Results   Component Value Date/Time    PROTHROMBTM 13.3 2023 11:10 AM    INR 0.98 2023 11:10 AM        Lab Results   Component Value Date/Time    WBC 5.5 2025 08:11 AM    RBC 5.09 2025 08:11 AM    HEMOGLOBIN 14.0 2025 08:11 AM    HEMATOCRIT 45.4 2025 08:11 AM    MCV 89.2 2025 08:11  AM    MCH 27.5 01/04/2025 08:11 AM    MCHC 30.8 (L) 01/04/2025 08:11 AM    MPV 11.5 01/04/2025 08:11 AM    NEUTSPOLYS 83.00 (H) 01/04/2025 08:11 AM    LYMPHOCYTES 7.80 (L) 01/04/2025 08:11 AM    MONOCYTES 7.60 01/04/2025 08:11 AM    EOSINOPHILS 0.70 01/04/2025 08:11 AM    BASOPHILS 0.50 01/04/2025 08:11 AM    HYPOCHROMIA 1+ 10/21/2024 07:08 AM    ANISOCYTOSIS 1+ 02/28/2023 07:31 AM        Lab Results   Component Value Date/Time    HBA1C 8.3 (H) 01/08/2025 11:36 AM          Imaging:   Prior personal imaging review:  MRI Lumbar Spine 12/19/24:  L4-5 mild canal, mild right and mild to moderate left subarticular and mild left foraminal stenosis.        I reviewed the following imaging reports:     MRI Lumbar Spine 12/19/24:  FINDINGS:  There is mild lumbar levoscoliosis. There is no pathologic marrow infiltration. There is no focal aggressive osseous lesion. There is no perivertebral, disc or epidural fluid collection.     At the level of L5-S1,there is mild diffuse disc bulge. There is mild facet joint arthropathy. There is no spinal or neural foraminal stenosis.     At the level of L4-5,there is minimal disc bulge. There is no spinal or neural foraminal stenosis.     At the level of L3-4,there is mild diffuse disc bulge. There is no spinal or neural foraminal stenosis     At the level of the L2-3, there is no spinal or neural foraminal stenosis.     At the level of L1-2, there is minimal disc bulge without significant spinal or neural foraminal stenosis.     The conus terminates at the level of L1. There is no lumbar intradural lesion.     Right iliac transplanted kidney is seen.     There is atrophied right native kidney. There is well-defined T1 hyperintensity in the right kidney likely representing a complicated cyst.     IMPRESSION:     Mild degenerative disease as described above.      DIAGNOSIS   {No diagnosis found. (Refresh or delete this SmartLink)}      ASSESSMENT and PLAN:     Tere Gallegos is a  74 y.o. female who returns to clinic for follow-up of ***.    There are no diagnoses linked to this encounter.    Assessment & Plan        -***    Follow up: {cktime follow up:63377}    Thank you for allowing me to participate in the care of this patient. If you have any questions please not hesitate to contact me.         Please note that this dictation was created using voice recognition software. I have made every reasonable attempt to correct obvious errors but there may be errors of grammar and content that I may have overlooked prior to finalization of this note.    Dot Matute MD  Interventional Spine and Sports Physiatry  Physical Medicine and Rehabilitation  [facility]

## 2025-02-11 NOTE — H&P
Special Procedures H&P    Patient Name: Tere Gallegos  Medical Record #: [id number]  ALLERGIES: No Known Allergies     CHIEF COMPLAINT/HISTORY OF PRESENT ILLNESS:  The patient is a 74 y.o. year old female with history of ESRD s/p kidney transplant on immunosuppression, CAD, chronic heart failure, T2DM (last A1C 9.5 on 24), afib on apixaban, GERD, scheduled for left L5-S1 TFESI. On presentation today, patient reports her pain in her left leg is significantly improved and not bothersome but that her pain is most bothersome in the bilateral low back pain and the left arm. We discussed that the injection today would be targeting her left leg pain and since this is now better we should cancel this injection and discuss her low back and left arm pain in clinic to see if there are additional treatments that could be beneficial for this pain. Patient voiced understanding and will follow up in clinic.     PAST MEDICAL HISTORY:  Past Medical History:   Diagnosis Date    Acquired hypothyroidism 2020    CAD (coronary artery disease)     Chronic diastolic heart failure ([facility]) 2020    Coronary artery disease due to lipid rich plaque     2 Synergy NOEMI to 100% RCA stent placed    Dental disorder     partial dentures- uppers    Diabetes ([facility])     oral medication    ESRD (end stage renal disease) on dialysis ([facility]) 2020    Hemodialysis patient ([facility])     M, W, F    Hyperlipidemia     Hypertension     Kidney transplant candidate     Kidney transplant recipient 10/31/2022    Presence of drug-eluting stent in right coronary artery     QT prolongation 2020    RLS (restless legs syndrome) 2016    Transaminitis 2018        PAST SURGICAL HISTORY:  Past Surgical History:   Procedure Laterality Date    URETERAL REIMPLANTATION  2023    [place] [facility] [place]    OTHER ABDOMINAL SURGERY Right 10/31/2022     Donor kidney transplant - [facility]    ZZZ  CARDIAC CATH  09/07/2016    RCA stented with 2 Synergy drug-eluting stents.    RECOVERY  08/16/2016    Procedure: CATH LAB [facility] WITH POSSIBLE DR. CASTILLO;  Surgeon: [facility];  Location: SURGERY PRE-POST PROC UNIT [facility] [place];  Service:     ZZZ CARDIAC CATH  08/16/2016    100% RCA    OTHER Left 2014    left arm upper extremity fistula    OTHER ABDOMINAL SURGERY      left kidney removed due to cancer        SOCIAL HISTORY:  Social History     Socioeconomic History    Marital status:      Spouse name: Not on file    Number of children: Not on file    Years of education: Not on file    Highest education level: Not on file   Occupational History    Not on file   Tobacco Use    Smoking status: Never    Smokeless tobacco: Never   Vaping Use    Vaping status: Never Used   Substance and Sexual Activity    Alcohol use: No     Alcohol/week: 0.0 oz    Drug use: No    Sexual activity: Not Currently   Other Topics Concern    Not on file   Social History Narrative    Not on file     Social Drivers of Health     Financial Resource Strain: Low Risk  (10/18/2024)    Overall Financial Resource Strain (CARDIA)     Difficulty of Paying Living Expenses: Not very hard   Food Insecurity: No Food Insecurity (10/18/2024)    Hunger Vital Sign     Worried About Running Out of Food in the Last Year: Never true     Ran Out of Food in the Last Year: Never true   Transportation Needs: No Transportation Needs (10/18/2024)    PRAPARE - Transportation     Lack of Transportation (Medical): No     Lack of Transportation (Non-Medical): No   Physical Activity: Inactive (4/9/2024)    Exercise Vital Sign     Days of Exercise per Week: 0 days     Minutes of Exercise per Session: 0 min   Stress: No Stress Concern Present (4/9/2024)    [employer] - Occupational Stress Questionnaire     Feeling of Stress : Only a little   Social Connections: Moderately Isolated (4/9/2024)    Social Connection and Isolation Panel  [NHANES]     Frequency of Communication with Friends and Family: More than three times a week     Frequency of Social Gatherings with Friends and Family: More than three times a week     Attends Yarsani Services: Never     Active Member of Clubs or Organizations: No     Attends Club or Organization Meetings: Never     Marital Status:    Intimate Partner Violence: Not At Risk (10/15/2024)    Humiliation, Afraid, Rape, and Kick questionnaire     Fear of Current or Ex-Partner: No     Emotionally Abused: No     Physically Abused: No     Sexually Abused: No   Housing Stability: Low Risk  (10/18/2024)    Housing Stability Vital Sign     Unable to Pay for Housing in the Last Year: No     Number of Times Moved in the Last Year: 1     Homeless in the Last Year: No        CURRENT MEDICATIONS:  No current facility-administered medications on file prior to encounter.     Current Outpatient Medications on File Prior to Encounter   Medication Sig Dispense Refill    atorvastatin (LIPITOR) 20 MG Tab TOME 1 TABLETA POR VIA ORAL TODOS LOS MCCORMICK EN LA NOCHE 100 Tablet 3    tacrolimus (PROGRAF) 1 MG Cap TAKE 1 CAPSULE BY MOUTH TWICE DAILY 60 Capsule 11    insulin glargine (LANTUS SOLOSTAR) 100 UNIT/ML Solution Pen-injector injection Inject 10 Units under the skin 2 times a day. 5 pens per month 20 mL 3    apixaban (ELIQUIS) 2.5mg Tab Take 1 Tablet by mouth 2 times a day. TOME 1 TABLETA POR VIA ORAL DOS VECES AL DESMOND 60 Tablet 5    apixaban (ELIQUIS) 2.5mg Tab Take 1 Tablet by mouth 2 times a day. 14 Tablet 0    ELIQUIS 2.5 MG Tab TOME 1 TABLETA POR VIA ORAL DOS VECES AL DESMOND (Patient taking differently: Take 2.5 mg by mouth 2 times a day. Indications: Atrial Fibrillation) 60 Tablet 5    polyethylene glycol 3350 (MIRALAX) 17 GM/SCOOP Powder Romi bernardo vez al d a seg n sea necesario para el estre imiento 238 g 1    furosemide (LASIX) 80 MG Tab Take 1 Tablet by mouth every day. 100 Tablet 3    gabapentin (NEURONTIN) 100 MG Cap Take 1  Capsule by mouth every evening for 90 days. 90 Capsule 0    acetaminophen (TYLENOL) 500 MG Tab Take 500-1,000 mg by mouth every 6 hours as needed.      BD PEN NEEDLE PETE 2ND GEN USE AS DIRECTED WITH INSULIN PENS SIX TIMES DAILY 200 Each 3    triamcinolone acetonide (KENALOG) 0.1 % Cream Aplique bernardo cantidad del tamano de un guisante sobre la piel bernardo vez al fernanda janna sea necesario para la picazon o el sarpullido. 45 g 2    amLODIPine (NORVASC) 10 MG Tab TOME BERNARDO TABLETA TODOS LOS MCCORMICK 90 Tablet 1    Continuous Glucose Sensor (FREESTYLE BALDOMERO 3 SENSOR) Misc 1 Each every 14 days. 6 Each 3    Continuous Glucose  (FREESTYLE BALDOMERO 3 READER) Device 1 Each every day. 1 Each 0    levothyroxine (SYNTHROID) 88 MCG Tab Take 1 Tablet by mouth every morning on an empty stomach. 90 Tablet 3    FARXIGA 5 MG Tab Take 1 Tablet by mouth every day. Por diabetes 90 Tablet 3    glucose blood (ACCU-CHEK GUIDE) strip USE ONE COVERED STRIP TO TEST BLOOD SUGAR THREE TIMES DAILY. 100 Strip 3    Lancets Use one covered lancet to test blood sugar three times daily. 100 Each 3    mycophenolate (CELLCEPT) 250 MG Cap Take 1 Capsule by mouth 2 times a day. 20 Capsule 0    predniSONE (DELTASONE) 5 MG Tab Take 5 mg by mouth every day.            REVIEW OF SYSTEMS:  Denies fevers. No recent changes in bowel or bladder function. Outside of this and items listed above, complete systems review is negative.     PHYSICAL EXAMINATION:  Musculoskeletal : normal muscle bulk and tone  Neuro: At baseline orientation and alertness  Lungs: No increased work of breathing on room air  Airway: Class 2  ASA Level: 2, Mild to moderate systemic disturbance     IMPRESSION/PLAN:  74 y.o. year old female with history of ESRD s/p kidney transplant on immunosuppression, CAD, chronic heart failure, T2DM (last A1C 9.5 on 6/12/24), afib on apixaban, GERD, scheduled for left L5-S1 TFESI. On presentation today, patient reports her pain in her left leg is significantly  improved and not bothersome but that her pain is most bothersome in the bilateral low back pain and the left arm. We discussed that the injection today would be targeting her left leg pain and since this is now better we should cancel this injection and discuss her low back and left arm pain in clinic to see if there are additional treatments that could be beneficial for this pain. Patient voiced understanding and will follow up in clinic.

## 2025-02-14 ENCOUNTER — APPOINTMENT (OUTPATIENT)
Dept: PHYSICAL MEDICINE AND REHAB | Facility: MEDICAL CENTER | Age: 75
End: 2025-02-14
Payer: MEDICARE

## 2025-02-14 NOTE — ED NOTES
MD Notification    Notified Person: MD    Notified Person Name: Hernesto CARDENAS    Notification Date/Time: 2/13 @ 1720    Notification Interaction: [email or web address] messaging     Purpose of Notification: /92    Orders Received: PRN IV hydralazine     Comments:    PIV started.     Pt medicated per MAR.

## 2025-02-15 DIAGNOSIS — Z79.4 TYPE 2 DIABETES MELLITUS WITH OTHER SPECIFIED COMPLICATION, WITH LONG-TERM CURRENT USE OF INSULIN (HCC): ICD-10-CM

## 2025-02-15 DIAGNOSIS — E11.69 TYPE 2 DIABETES MELLITUS WITH OTHER SPECIFIED COMPLICATION, WITH LONG-TERM CURRENT USE OF INSULIN (HCC): ICD-10-CM

## 2025-02-18 RX ORDER — INSULIN GLARGINE 100 [IU]/ML
10 INJECTION, SOLUTION SUBCUTANEOUS 2 TIMES DAILY
Qty: 18 ML | Refills: 3 | Status: SHIPPED | OUTPATIENT
Start: 2025-02-18

## 2025-02-22 ENCOUNTER — HOSPITAL ENCOUNTER (OUTPATIENT)
Facility: MEDICAL CENTER | Age: 75
End: 2025-02-22
Attending: INTERNAL MEDICINE
Payer: MEDICARE

## 2025-02-22 DIAGNOSIS — E55.9 VITAMIN D DEFICIENCY: ICD-10-CM

## 2025-02-22 DIAGNOSIS — Z87.440 HISTORY OF UTI: ICD-10-CM

## 2025-02-22 DIAGNOSIS — Z94.0 DECEASED-DONOR KIDNEY TRANSPLANT RECIPIENT: Chronic | ICD-10-CM

## 2025-02-22 DIAGNOSIS — N18.32 STAGE 3B CHRONIC KIDNEY DISEASE: ICD-10-CM

## 2025-02-22 LAB
25(OH)D3 SERPL-MCNC: 24 NG/ML (ref 30–100)
ALBUMIN SERPL BCP-MCNC: 4.2 G/DL (ref 3.2–4.9)
ANION GAP SERPL CALC-SCNC: 11 MMOL/L (ref 7–16)
APPEARANCE UR: CLEAR
BACTERIA #/AREA URNS HPF: ABNORMAL /HPF
BILIRUB UR QL STRIP.AUTO: NEGATIVE
BUN SERPL-MCNC: 62 MG/DL (ref 8–22)
CALCIUM SERPL-MCNC: 10.9 MG/DL (ref 8.5–10.5)
CASTS URNS QL MICRO: ABNORMAL /LPF (ref 0–2)
CHLORIDE SERPL-SCNC: 106 MMOL/L (ref 96–112)
CO2 SERPL-SCNC: 22 MMOL/L (ref 20–33)
COLOR UR: YELLOW
CREAT SERPL-MCNC: 2.12 MG/DL (ref 0.5–1.4)
CREAT UR-MCNC: 103 MG/DL
CREAT UR-MCNC: 104 MG/DL
EPITHELIAL CELLS 1715: ABNORMAL /HPF (ref 0–5)
ERYTHROCYTE [DISTWIDTH] IN BLOOD BY AUTOMATED COUNT: 44.3 FL (ref 35.9–50)
GFR SERPLBLD CREATININE-BSD FMLA CKD-EPI: 24 ML/MIN/1.73 M 2
GLUCOSE SERPL-MCNC: 178 MG/DL (ref 65–99)
GLUCOSE UR STRIP.AUTO-MCNC: >=1000 MG/DL
HCT VFR BLD AUTO: 49.4 % (ref 37–47)
HGB BLD-MCNC: 15.2 G/DL (ref 12–16)
KETONES UR STRIP.AUTO-MCNC: NEGATIVE MG/DL
LEUKOCYTE ESTERASE UR QL STRIP.AUTO: ABNORMAL
MCH RBC QN AUTO: 27.1 PG (ref 27–33)
MCHC RBC AUTO-ENTMCNC: 30.8 G/DL (ref 32.2–35.5)
MCV RBC AUTO: 88.1 FL (ref 81.4–97.8)
MICRO URNS: ABNORMAL
MICROALBUMIN UR-MCNC: 2.6 MG/DL
MICROALBUMIN/CREAT UR: 25 MG/G (ref 0–30)
NITRITE UR QL STRIP.AUTO: NEGATIVE
PH UR STRIP.AUTO: 5 [PH] (ref 5–8)
PHOSPHATE SERPL-MCNC: 3.8 MG/DL (ref 2.5–4.5)
PLATELET # BLD AUTO: 118 K/UL (ref 164–446)
POTASSIUM SERPL-SCNC: 4.9 MMOL/L (ref 3.6–5.5)
PROT UR QL STRIP: NEGATIVE MG/DL
PROT UR-MCNC: 10.7 MG/DL (ref 0–15)
PROT/CREAT UR: 104 MG/G (ref 10–107)
PTH-INTACT SERPL-MCNC: 135 PG/ML (ref 14–72)
RBC # BLD AUTO: 5.61 M/UL (ref 4.2–5.4)
RBC # URNS HPF: ABNORMAL /HPF (ref 0–2)
RBC UR QL AUTO: NEGATIVE
SODIUM SERPL-SCNC: 139 MMOL/L (ref 135–145)
SP GR UR STRIP.AUTO: 1.02
UROBILINOGEN UR STRIP.AUTO-MCNC: 0.2 EU/DL
WBC # BLD AUTO: 6 K/UL (ref 4.8–10.8)
WBC #/AREA URNS HPF: ABNORMAL /HPF

## 2025-02-22 PROCEDURE — 87086 URINE CULTURE/COLONY COUNT: CPT

## 2025-02-22 PROCEDURE — 87186 SC STD MICRODIL/AGAR DIL: CPT

## 2025-02-22 PROCEDURE — 81001 URINALYSIS AUTO W/SCOPE: CPT

## 2025-02-22 PROCEDURE — 36415 COLL VENOUS BLD VENIPUNCTURE: CPT

## 2025-02-22 PROCEDURE — 82040 ASSAY OF SERUM ALBUMIN: CPT

## 2025-02-22 PROCEDURE — 82043 UR ALBUMIN QUANTITATIVE: CPT

## 2025-02-22 PROCEDURE — 84100 ASSAY OF PHOSPHORUS: CPT

## 2025-02-22 PROCEDURE — 85027 COMPLETE CBC AUTOMATED: CPT

## 2025-02-22 PROCEDURE — 80048 BASIC METABOLIC PNL TOTAL CA: CPT

## 2025-02-22 PROCEDURE — 83970 ASSAY OF PARATHORMONE: CPT

## 2025-02-22 PROCEDURE — 84156 ASSAY OF PROTEIN URINE: CPT

## 2025-02-22 PROCEDURE — 82306 VITAMIN D 25 HYDROXY: CPT

## 2025-02-22 PROCEDURE — 82570 ASSAY OF URINE CREATININE: CPT | Mod: 91

## 2025-02-22 PROCEDURE — 87799 DETECT AGENT NOS DNA QUANT: CPT

## 2025-02-22 PROCEDURE — 87077 CULTURE AEROBIC IDENTIFY: CPT

## 2025-02-22 PROCEDURE — 80197 ASSAY OF TACROLIMUS: CPT

## 2025-02-23 LAB — TACROLIMUS BLD-MCNC: 5.3 NG/ML (ref 5–20)

## 2025-02-25 LAB
BACTERIA UR CULT: ABNORMAL
BACTERIA UR CULT: ABNORMAL
BK PLASMA INTERP, QNT NAAT NL11711: DETECTED
BK PLASMA IU/ML, QNT NAAT NL11709: 37 IU/ML
BK PLASMA LOG IU/ML, QNT NAAT NL11710: 1.57 LOG IU/ML
BK UR INTERP, QNT NAAT NL11708: DETECTED
BK UR IU/ML, QNT NAAT NL11706: ABNORMAL IU/ML
BK UR LOG IU/ML, QNT NAAT NL11707: 5.17 LOG IU/ML
SIGNIFICANT IND 70042: ABNORMAL
SITE SITE: ABNORMAL
SOURCE SOURCE: ABNORMAL

## 2025-03-13 DIAGNOSIS — I10 ESSENTIAL HYPERTENSION: ICD-10-CM

## 2025-03-13 RX ORDER — FUROSEMIDE 40 MG/1
40 TABLET ORAL DAILY
Qty: 90 TABLET | Refills: 2 | OUTPATIENT
Start: 2025-03-13

## 2025-03-26 ENCOUNTER — OFFICE VISIT (OUTPATIENT)
Dept: MEDICAL GROUP | Facility: IMAGING CENTER | Age: 75
End: 2025-03-26
Payer: MEDICARE

## 2025-03-26 VITALS
RESPIRATION RATE: 14 BRPM | BODY MASS INDEX: 24.39 KG/M2 | TEMPERATURE: 98.2 F | HEART RATE: 64 BPM | DIASTOLIC BLOOD PRESSURE: 40 MMHG | HEIGHT: 59 IN | SYSTOLIC BLOOD PRESSURE: 130 MMHG | OXYGEN SATURATION: 96 % | WEIGHT: 121 LBS

## 2025-03-26 DIAGNOSIS — N18.4 CKD (CHRONIC KIDNEY DISEASE) STAGE 4, GFR 15-29 ML/MIN (HCC): ICD-10-CM

## 2025-03-26 DIAGNOSIS — Z79.4 TYPE 2 DIABETES MELLITUS WITH STAGE 4 CHRONIC KIDNEY DISEASE, WITH LONG-TERM CURRENT USE OF INSULIN (HCC): ICD-10-CM

## 2025-03-26 DIAGNOSIS — E11.22 TYPE 2 DIABETES MELLITUS WITH STAGE 4 CHRONIC KIDNEY DISEASE, WITH LONG-TERM CURRENT USE OF INSULIN (HCC): ICD-10-CM

## 2025-03-26 DIAGNOSIS — I10 PRIMARY HYPERTENSION: Chronic | ICD-10-CM

## 2025-03-26 DIAGNOSIS — N18.4 TYPE 2 DIABETES MELLITUS WITH STAGE 4 CHRONIC KIDNEY DISEASE, WITH LONG-TERM CURRENT USE OF INSULIN (HCC): ICD-10-CM

## 2025-03-26 PROCEDURE — 99214 OFFICE O/P EST MOD 30 MIN: CPT | Performed by: NURSE PRACTITIONER

## 2025-03-26 PROCEDURE — 3075F SYST BP GE 130 - 139MM HG: CPT | Performed by: NURSE PRACTITIONER

## 2025-03-26 PROCEDURE — 3078F DIAST BP <80 MM HG: CPT | Performed by: NURSE PRACTITIONER

## 2025-03-26 RX ORDER — GABAPENTIN 100 MG/1
100 CAPSULE ORAL 3 TIMES DAILY
COMMUNITY

## 2025-03-26 RX ORDER — METOPROLOL SUCCINATE 25 MG/1
25 TABLET, EXTENDED RELEASE ORAL DAILY
COMMUNITY
Start: 2025-03-13

## 2025-03-26 ASSESSMENT — FIBROSIS 4 INDEX: FIB4 SCORE: 2.36

## 2025-04-02 ENCOUNTER — HOSPITAL ENCOUNTER (OUTPATIENT)
Facility: MEDICAL CENTER | Age: 75
End: 2025-04-02
Payer: MEDICARE

## 2025-04-02 ENCOUNTER — HOSPITAL ENCOUNTER (OUTPATIENT)
Facility: MEDICAL CENTER | Age: 75
End: 2025-04-02
Attending: INTERNAL MEDICINE
Payer: MEDICARE

## 2025-04-02 ENCOUNTER — TELEPHONE (OUTPATIENT)
Dept: NEPHROLOGY | Facility: MEDICAL CENTER | Age: 75
End: 2025-04-02
Payer: MEDICARE

## 2025-04-02 DIAGNOSIS — N18.4 CKD (CHRONIC KIDNEY DISEASE) STAGE 4, GFR 15-29 ML/MIN (HCC): ICD-10-CM

## 2025-04-02 DIAGNOSIS — I10 PRIMARY HYPERTENSION: Chronic | ICD-10-CM

## 2025-04-02 DIAGNOSIS — D64.9 ANEMIA, UNSPECIFIED TYPE: ICD-10-CM

## 2025-04-02 DIAGNOSIS — Z94.0 DECEASED-DONOR KIDNEY TRANSPLANT RECIPIENT: Chronic | ICD-10-CM

## 2025-04-02 DIAGNOSIS — E55.9 VITAMIN D DEFICIENCY: ICD-10-CM

## 2025-04-02 DIAGNOSIS — Z87.440 HISTORY OF UTI: ICD-10-CM

## 2025-04-02 DIAGNOSIS — B34.8 BK VIREMIA: ICD-10-CM

## 2025-04-02 DIAGNOSIS — E21.3 HYPERPARATHYROIDISM (HCC): ICD-10-CM

## 2025-04-02 LAB
25(OH)D3 SERPL-MCNC: 19 NG/ML (ref 30–100)
ALBUMIN SERPL BCP-MCNC: 4.2 G/DL (ref 3.2–4.9)
ANION GAP SERPL CALC-SCNC: 12 MMOL/L (ref 7–16)
APPEARANCE UR: CLEAR
BILIRUB UR QL STRIP.AUTO: NEGATIVE
BUN SERPL-MCNC: 37 MG/DL (ref 8–22)
CALCIUM SERPL-MCNC: 10.6 MG/DL (ref 8.5–10.5)
CHLORIDE SERPL-SCNC: 100 MMOL/L (ref 96–112)
CO2 SERPL-SCNC: 21 MMOL/L (ref 20–33)
COLOR UR: YELLOW
CREAT SERPL-MCNC: 1.49 MG/DL (ref 0.5–1.4)
CREAT UR-MCNC: 68.4 MG/DL
CREAT UR-MCNC: 69.1 MG/DL
ERYTHROCYTE [DISTWIDTH] IN BLOOD BY AUTOMATED COUNT: 43.1 FL (ref 35.9–50)
FERRITIN SERPL-MCNC: 975 NG/ML (ref 10–291)
GFR SERPLBLD CREATININE-BSD FMLA CKD-EPI: 36 ML/MIN/1.73 M 2
GLUCOSE SERPL-MCNC: 180 MG/DL (ref 65–99)
GLUCOSE UR STRIP.AUTO-MCNC: >=1000 MG/DL
HCT VFR BLD AUTO: 47.9 % (ref 37–47)
HGB BLD-MCNC: 14.8 G/DL (ref 12–16)
IRON SATN MFR SERPL: 22 % (ref 15–55)
IRON SERPL-MCNC: 54 UG/DL (ref 40–170)
KETONES UR STRIP.AUTO-MCNC: NEGATIVE MG/DL
LEUKOCYTE ESTERASE UR QL STRIP.AUTO: NEGATIVE
MCH RBC QN AUTO: 26.8 PG (ref 27–33)
MCHC RBC AUTO-ENTMCNC: 30.9 G/DL (ref 32.2–35.5)
MCV RBC AUTO: 86.6 FL (ref 81.4–97.8)
MICRO URNS: ABNORMAL
MICROALBUMIN UR-MCNC: 1.3 MG/DL
MICROALBUMIN/CREAT UR: 19 MG/G (ref 0–30)
NITRITE UR QL STRIP.AUTO: NEGATIVE
PH UR STRIP.AUTO: 5 [PH] (ref 5–8)
PHOSPHATE SERPL-MCNC: 3.2 MG/DL (ref 2.5–4.5)
PLATELET # BLD AUTO: 118 K/UL (ref 164–446)
PMV BLD AUTO: 12.6 FL (ref 9–12.9)
POTASSIUM SERPL-SCNC: 4.3 MMOL/L (ref 3.6–5.5)
PROT UR QL STRIP: NEGATIVE MG/DL
PROT UR-MCNC: 8.1 MG/DL (ref 0–15)
PROT/CREAT UR: 117 MG/G (ref 10–107)
PTH-INTACT SERPL-MCNC: 114 PG/ML (ref 14–72)
RBC # BLD AUTO: 5.53 M/UL (ref 4.2–5.4)
RBC UR QL AUTO: NEGATIVE
SODIUM SERPL-SCNC: 133 MMOL/L (ref 135–145)
SP GR UR STRIP.AUTO: 1.02
TIBC SERPL-MCNC: 248 UG/DL (ref 250–450)
UIBC SERPL-MCNC: 194 UG/DL (ref 110–370)
UROBILINOGEN UR STRIP.AUTO-MCNC: 0.2 EU/DL
WBC # BLD AUTO: 5.5 K/UL (ref 4.8–10.8)

## 2025-04-02 PROCEDURE — 82043 UR ALBUMIN QUANTITATIVE: CPT

## 2025-04-02 PROCEDURE — 82570 ASSAY OF URINE CREATININE: CPT | Mod: 91

## 2025-04-02 PROCEDURE — 83970 ASSAY OF PARATHORMONE: CPT

## 2025-04-02 PROCEDURE — 84443 ASSAY THYROID STIM HORMONE: CPT

## 2025-04-02 PROCEDURE — 84439 ASSAY OF FREE THYROXINE: CPT

## 2025-04-02 PROCEDURE — 84156 ASSAY OF PROTEIN URINE: CPT

## 2025-04-02 PROCEDURE — 80048 BASIC METABOLIC PNL TOTAL CA: CPT

## 2025-04-02 PROCEDURE — 83540 ASSAY OF IRON: CPT

## 2025-04-02 PROCEDURE — 83550 IRON BINDING TEST: CPT

## 2025-04-02 PROCEDURE — 80053 COMPREHEN METABOLIC PANEL: CPT

## 2025-04-02 PROCEDURE — 85027 COMPLETE CBC AUTOMATED: CPT

## 2025-04-02 PROCEDURE — 81003 URINALYSIS AUTO W/O SCOPE: CPT

## 2025-04-02 PROCEDURE — 80061 LIPID PANEL: CPT

## 2025-04-02 PROCEDURE — 82306 VITAMIN D 25 HYDROXY: CPT

## 2025-04-02 PROCEDURE — 80197 ASSAY OF TACROLIMUS: CPT

## 2025-04-02 PROCEDURE — 84100 ASSAY OF PHOSPHORUS: CPT

## 2025-04-02 PROCEDURE — 87799 DETECT AGENT NOS DNA QUANT: CPT | Mod: 91

## 2025-04-02 PROCEDURE — 82040 ASSAY OF SERUM ALBUMIN: CPT

## 2025-04-02 PROCEDURE — 36415 COLL VENOUS BLD VENIPUNCTURE: CPT

## 2025-04-02 PROCEDURE — 82728 ASSAY OF FERRITIN: CPT

## 2025-04-02 NOTE — TELEPHONE ENCOUNTER
Lila from [facility] called and asked if Dr. Villar would like Tacrolimus added to the labs that were drawn today?

## 2025-04-03 ENCOUNTER — TELEPHONE (OUTPATIENT)
Dept: ENDOCRINOLOGY | Facility: MEDICAL CENTER | Age: 75
End: 2025-04-03
Payer: MEDICARE

## 2025-04-03 ENCOUNTER — HOSPITAL ENCOUNTER (OUTPATIENT)
Facility: MEDICAL CENTER | Age: 75
End: 2025-04-03
Payer: MEDICARE

## 2025-04-03 DIAGNOSIS — E11.69 TYPE 2 DIABETES MELLITUS WITH OTHER SPECIFIED COMPLICATION, WITH LONG-TERM CURRENT USE OF INSULIN (HCC): ICD-10-CM

## 2025-04-03 DIAGNOSIS — E78.5 DYSLIPIDEMIA: ICD-10-CM

## 2025-04-03 DIAGNOSIS — Z79.4 TYPE 2 DIABETES MELLITUS WITH OTHER SPECIFIED COMPLICATION, WITH LONG-TERM CURRENT USE OF INSULIN (HCC): ICD-10-CM

## 2025-04-03 DIAGNOSIS — E03.9 HYPOTHYROIDISM, ACQUIRED: ICD-10-CM

## 2025-04-03 LAB
ALBUMIN SERPL BCP-MCNC: 4.3 G/DL (ref 3.2–4.9)
ALBUMIN/GLOB SERPL: 1.5 G/DL
ALP SERPL-CCNC: 88 U/L (ref 30–99)
ALT SERPL-CCNC: 22 U/L (ref 2–50)
ANION GAP SERPL CALC-SCNC: 19 MMOL/L (ref 7–16)
AST SERPL-CCNC: 21 U/L (ref 12–45)
BILIRUB SERPL-MCNC: 0.4 MG/DL (ref 0.1–1.5)
BUN SERPL-MCNC: 38 MG/DL (ref 8–22)
CALCIUM ALBUM COR SERPL-MCNC: 10.3 MG/DL (ref 8.5–10.5)
CALCIUM SERPL-MCNC: 10.5 MG/DL (ref 8.5–10.5)
CHLORIDE SERPL-SCNC: 101 MMOL/L (ref 96–112)
CHOLEST SERPL-MCNC: 136 MG/DL (ref 100–199)
CO2 SERPL-SCNC: 15 MMOL/L (ref 20–33)
CREAT SERPL-MCNC: 1.56 MG/DL (ref 0.5–1.4)
FASTING STATUS PATIENT QL REPORTED: NORMAL
GFR SERPLBLD CREATININE-BSD FMLA CKD-EPI: 34 ML/MIN/1.73 M 2
GLOBULIN SER CALC-MCNC: 2.9 G/DL (ref 1.9–3.5)
GLUCOSE SERPL-MCNC: 178 MG/DL (ref 65–99)
HDLC SERPL-MCNC: 39 MG/DL
LDLC SERPL CALC-MCNC: 50 MG/DL
POTASSIUM SERPL-SCNC: 4.4 MMOL/L (ref 3.6–5.5)
PROT SERPL-MCNC: 7.2 G/DL (ref 6–8.2)
SODIUM SERPL-SCNC: 135 MMOL/L (ref 135–145)
T4 FREE SERPL-MCNC: 1.99 NG/DL (ref 0.93–1.7)
TRIGL SERPL-MCNC: 233 MG/DL (ref 0–149)
TSH SERPL-ACNC: 1.53 UIU/ML (ref 0.38–5.33)

## 2025-04-03 NOTE — TELEPHONE ENCOUNTER
Calling lab to see if they can run the orders hazel placed in January with the blood draw from yesterday.

## 2025-04-04 DIAGNOSIS — Z94.0 DECEASED-DONOR KIDNEY TRANSPLANT RECIPIENT: Chronic | ICD-10-CM

## 2025-04-04 LAB
BK PLASMA INTERP, QNT NAAT NL11711: DETECTED
BK PLASMA IU/ML, QNT NAAT NL11709: 61 IU/ML
BK PLASMA LOG IU/ML, QNT NAAT NL11710: 1.78 LOG IU/ML
BK UR INTERP, QNT NAAT NL11708: DETECTED
BK UR IU/ML, QNT NAAT NL11706: ABNORMAL IU/ML
BK UR LOG IU/ML, QNT NAAT NL11707: 4.49 LOG IU/ML

## 2025-04-05 ENCOUNTER — HOSPITAL ENCOUNTER (OUTPATIENT)
Facility: MEDICAL CENTER | Age: 75
End: 2025-04-05
Payer: MEDICARE

## 2025-04-05 DIAGNOSIS — E11.69 TYPE 2 DIABETES MELLITUS WITH OTHER SPECIFIED COMPLICATION, WITH LONG-TERM CURRENT USE OF INSULIN (HCC): ICD-10-CM

## 2025-04-05 DIAGNOSIS — E03.9 HYPOTHYROIDISM, ACQUIRED: ICD-10-CM

## 2025-04-05 DIAGNOSIS — E78.5 DYSLIPIDEMIA: ICD-10-CM

## 2025-04-05 DIAGNOSIS — Z79.4 TYPE 2 DIABETES MELLITUS WITH OTHER SPECIFIED COMPLICATION, WITH LONG-TERM CURRENT USE OF INSULIN (HCC): ICD-10-CM

## 2025-04-05 DIAGNOSIS — E55.9 VITAMIN D DEFICIENCY: ICD-10-CM

## 2025-04-05 LAB
25(OH)D3 SERPL-MCNC: 17 NG/ML (ref 30–100)
ALBUMIN SERPL BCP-MCNC: 4.1 G/DL (ref 3.2–4.9)
ALBUMIN/GLOB SERPL: 1.5 G/DL
ALP SERPL-CCNC: 95 U/L (ref 30–99)
ALT SERPL-CCNC: 22 U/L (ref 2–50)
ANION GAP SERPL CALC-SCNC: 17 MMOL/L (ref 7–16)
AST SERPL-CCNC: 18 U/L (ref 12–45)
BILIRUB SERPL-MCNC: 0.4 MG/DL (ref 0.1–1.5)
BUN SERPL-MCNC: 51 MG/DL (ref 8–22)
CALCIUM ALBUM COR SERPL-MCNC: 10.4 MG/DL (ref 8.5–10.5)
CALCIUM SERPL-MCNC: 10.5 MG/DL (ref 8.5–10.5)
CHLORIDE SERPL-SCNC: 101 MMOL/L (ref 96–112)
CHOLEST SERPL-MCNC: 134 MG/DL (ref 100–199)
CO2 SERPL-SCNC: 17 MMOL/L (ref 20–33)
CREAT SERPL-MCNC: 1.65 MG/DL (ref 0.5–1.4)
CREAT UR-MCNC: 72.3 MG/DL
FASTING STATUS PATIENT QL REPORTED: NORMAL
GFR SERPLBLD CREATININE-BSD FMLA CKD-EPI: 32 ML/MIN/1.73 M 2
GLOBULIN SER CALC-MCNC: 2.7 G/DL (ref 1.9–3.5)
GLUCOSE SERPL-MCNC: 237 MG/DL (ref 65–99)
HDLC SERPL-MCNC: 35 MG/DL
LDLC SERPL CALC-MCNC: 47 MG/DL
MICROALBUMIN UR-MCNC: <1.2 MG/DL
MICROALBUMIN/CREAT UR: NORMAL MG/G (ref 0–30)
POTASSIUM SERPL-SCNC: 4.7 MMOL/L (ref 3.6–5.5)
PROT SERPL-MCNC: 6.8 G/DL (ref 6–8.2)
SODIUM SERPL-SCNC: 135 MMOL/L (ref 135–145)
T4 FREE SERPL-MCNC: 1.63 NG/DL (ref 0.93–1.7)
TACROLIMUS BLD-MCNC: 4.1 NG/ML (ref 5–20)
TRIGL SERPL-MCNC: 259 MG/DL (ref 0–149)
TSH SERPL DL<=0.005 MIU/L-ACNC: 1.09 UIU/ML (ref 0.38–5.33)

## 2025-04-05 PROCEDURE — 84443 ASSAY THYROID STIM HORMONE: CPT

## 2025-04-05 PROCEDURE — 84439 ASSAY OF FREE THYROXINE: CPT

## 2025-04-05 PROCEDURE — 82570 ASSAY OF URINE CREATININE: CPT

## 2025-04-05 PROCEDURE — 80061 LIPID PANEL: CPT

## 2025-04-05 PROCEDURE — 82043 UR ALBUMIN QUANTITATIVE: CPT

## 2025-04-05 PROCEDURE — 36415 COLL VENOUS BLD VENIPUNCTURE: CPT

## 2025-04-05 PROCEDURE — 80053 COMPREHEN METABOLIC PANEL: CPT

## 2025-04-05 PROCEDURE — 82306 VITAMIN D 25 HYDROXY: CPT

## 2025-04-09 ENCOUNTER — TELEPHONE (OUTPATIENT)
Dept: HEALTH INFORMATION MANAGEMENT | Facility: OTHER | Age: 75
End: 2025-04-09
Payer: MEDICARE

## 2025-04-11 ENCOUNTER — ANTICOAGULATION VISIT (OUTPATIENT)
Dept: VASCULAR LAB | Facility: MEDICAL CENTER | Age: 75
End: 2025-04-11
Attending: INTERNAL MEDICINE
Payer: MEDICARE

## 2025-04-11 VITALS — DIASTOLIC BLOOD PRESSURE: 54 MMHG | HEART RATE: 50 BPM | SYSTOLIC BLOOD PRESSURE: 122 MMHG

## 2025-04-11 DIAGNOSIS — I48.0 PAROXYSMAL ATRIAL FIBRILLATION (HCC): ICD-10-CM

## 2025-04-11 DIAGNOSIS — D68.69 SECONDARY HYPERCOAGULABLE STATE (HCC): ICD-10-CM

## 2025-04-11 PROCEDURE — 99212 OFFICE O/P EST SF 10 MIN: CPT

## 2025-04-11 NOTE — PROGRESS NOTES
Target end date: Indefinite   Indication: AF  Drug: Eliquis 2.5 mg BID  CHADsVASC = 5 (HTN, DM, CHF, age, female)   HAS-BLED = 2 (HTN, age)    Health Status Since Last Assessment  Pt denies any new relevant medical problems, ED visits or hospitalizations  Pt denies any embolic events (stroke/tia/systemic embolism)    Adherence with DOAC Therapy  Pt HAS NOT missed doses in the average week.    Bleeding Risk Assessment  Pt denies epistaxis  Pt denies any hematuria  Pt denies any excessive or unusual bleeding/hematomas.  Pt denies any GI bleeds or hematemesis.  Pt denies any concerning daily headache or subdural hematoma symptoms.    Latest Hemoglobin: WNL  Hemoglobin   Date Value Ref Range Status   04/02/2025 14.8 12.0 - 16.0 g/dL Final     Latest Platelets:  Low, but appears stable   Platelet Count   Date Value Ref Range Status   04/02/2025 118 (L) 164 - 446 K/uL Final       Pt denies  ETOH overuse     Creatinine Clearance/Renal Function  Latest CrCl: 25.4 ml/min    Hepatic function  Latest LFTs: WNL  Pt denies any history of liver dysfunction        Drug Interactions  ASA/other antiplatelets: NONE  NSAID: NONE  Other drug interactions: NONE  Verified no anticonvulsant or azole therapy, education provided for future use.     Examination  Blood Pressure WNL  Vitals:    04/11/25 1620   BP: 122/54   Pulse: (!) 50   Symptomatic hypotension: Reports orthostatic hypotension on occasion   Significant gait impairment/imbalance/high risk for falls? Slight gait impairment, normal for age     Final Assessment and Recommendations:  Patient appears stable from the anticoagulation standpoint.    Benefits of continued DOAC therapy outweigh risks for this patient  Recommend pt continue with current DOAC therapy: Eliquis 2.5 mg BID    DOAC is affordable    Utilized translation services for this encounter.  Language Line - [phone]  [employer] ID - [id number]   Pam, ID # [id number]      Other Actions: CMP/CBC hemogram  ordered prior to next visit    Follow up:  Will follow up with patient 6 month(s).     Dominique OrantesD

## 2025-04-13 ENCOUNTER — DOCUMENTATION (OUTPATIENT)
Dept: VASCULAR LAB | Facility: MEDICAL CENTER | Age: 75
End: 2025-04-13
Payer: MEDICARE

## 2025-04-14 NOTE — PROGRESS NOTES
Initial anticoag note and most recent cards note reviewed.  Patient with afib and chads vasc = 5    Pending further recommendations, we will continue with indefinite anticoagulation with eliquis 2.5 mg bid as directed by cards    Will defer all management of rhythm, rate, and other cv issues, aside from anticoagulation, to cards Michael Bloch, MD  Anticoagulation Clinic

## 2025-04-15 NOTE — PROGRESS NOTES
CHW Hailey reached out to pt's son Manoj per contact note to introduce personal care management. Manoj stated he was at work and afternoons are typically a better time to contact him. Manoj also requested to speak with someone who speaks [place] for future calls. CHW will arrange to have interpretor on call. CHW will contact at a later time.   Per Dr. Contreras, he spoke to Dr. Anderson regarding this injection only referral. Dr. Contreras had concerns regarding this type of injection. He states that Dr. Anderson had another provider potentially in mind. He requests that she speak to Dr. Anderson's office regarding options.

## 2025-04-16 ENCOUNTER — OFFICE VISIT (OUTPATIENT)
Dept: ENDOCRINOLOGY | Facility: MEDICAL CENTER | Age: 75
End: 2025-04-16
Payer: MEDICARE

## 2025-04-16 VITALS
SYSTOLIC BLOOD PRESSURE: 142 MMHG | WEIGHT: 119 LBS | OXYGEN SATURATION: 96 % | DIASTOLIC BLOOD PRESSURE: 80 MMHG | BODY MASS INDEX: 24.04 KG/M2 | HEART RATE: 52 BPM

## 2025-04-16 DIAGNOSIS — E11.69 TYPE 2 DIABETES MELLITUS WITH OTHER SPECIFIED COMPLICATION, WITH LONG-TERM CURRENT USE OF INSULIN (HCC): ICD-10-CM

## 2025-04-16 DIAGNOSIS — N18.6 ESRD (END STAGE RENAL DISEASE) (HCC): ICD-10-CM

## 2025-04-16 DIAGNOSIS — G62.9 POLYNEUROPATHY: ICD-10-CM

## 2025-04-16 DIAGNOSIS — N25.81 SECONDARY HYPERPARATHYROIDISM (HCC): ICD-10-CM

## 2025-04-16 DIAGNOSIS — E55.9 VITAMIN D DEFICIENCY: ICD-10-CM

## 2025-04-16 DIAGNOSIS — E03.9 HYPOTHYROIDISM, ACQUIRED: ICD-10-CM

## 2025-04-16 DIAGNOSIS — I10 ESSENTIAL HYPERTENSION: ICD-10-CM

## 2025-04-16 DIAGNOSIS — Z79.4 TYPE 2 DIABETES MELLITUS WITH OTHER SPECIFIED COMPLICATION, WITH LONG-TERM CURRENT USE OF INSULIN (HCC): ICD-10-CM

## 2025-04-16 DIAGNOSIS — E78.5 DYSLIPIDEMIA: ICD-10-CM

## 2025-04-16 PROCEDURE — 3077F SYST BP >= 140 MM HG: CPT

## 2025-04-16 PROCEDURE — 99211 OFF/OP EST MAY X REQ PHY/QHP: CPT | Mod: 27

## 2025-04-16 PROCEDURE — 3079F DIAST BP 80-89 MM HG: CPT

## 2025-04-16 PROCEDURE — 99214 OFFICE O/P EST MOD 30 MIN: CPT | Mod: 25

## 2025-04-16 PROCEDURE — 95251 CONT GLUC MNTR ANALYSIS I&R: CPT

## 2025-04-16 PROCEDURE — 95250 CONT GLUC MNTR PHYS/QHP EQP: CPT

## 2025-04-16 RX ORDER — KETOROLAC TROMETHAMINE 30 MG/ML
1 INJECTION, SOLUTION INTRAMUSCULAR; INTRAVENOUS DAILY
Qty: 1 EACH | Refills: 0 | Status: SHIPPED | OUTPATIENT
Start: 2025-04-16 | End: 2025-04-18

## 2025-04-16 RX ORDER — INSULIN ASPART 100 [IU]/ML
5 INJECTION, SOLUTION INTRAVENOUS; SUBCUTANEOUS
Status: CANCELLED | OUTPATIENT
Start: 2025-04-16

## 2025-04-16 RX ORDER — INSULIN ASPART 100 [IU]/ML
15 INJECTION, SOLUTION INTRAVENOUS; SUBCUTANEOUS
Qty: 45 ML | Refills: 3 | Status: SHIPPED | OUTPATIENT
Start: 2025-04-16

## 2025-04-16 ASSESSMENT — FIBROSIS 4 INDEX: FIB4 SCORE: 2.44

## 2025-04-16 NOTE — PROGRESS NOTES
"Chief Complaint: Follow-up on the following conditions  HPI:   Tere Gallegos is a 73 y.o. female    1.  Type 2 diabetes mellitus with hyperglycemia:   Type 2 Diabetes Mellitus diagnosed 20 years ago.      She checks her blood sugars 3x/day  POC A1c on 25 is 11.4  POC A1c on 25 was 8.3  POC A1c on 24 was 8.8  POC A1c on 24 was 9.5  POC A1c on 2024 at 7.2%  POC a1c on 2023 at 6.2%  POC a1c on 2023 6.0%  POC a1c on 2023 at 5.8%  Last A1C on 21 at 5.7%, she was a dialysis patient which makes A1c unreliable.    Diabetes management:  Lantus 10 in am and 10 in pm  Novolg 15 units with each meal  Farxiga 5 mg daily    She denies hypoglycemic episodes in the morning    We put her on candelaria at her last visit. She states the reader stopped working after a few days.     She reports having her blood sugars above 200 in the morning.   She reports taking her mealtime insulin before eating.   She does not have her blood sugar log in clinic     Diet: \"healthy\" diet  in general  Exercise: minimal     Diabetes Complications   She  reports history of mild proliferative diabetic retinopathy.    She denies laser eye surgery.   Cataract surgery both eyes were done this year   Last eye exam: apt on       2.  Neuropathy:   Reports numbness or tingling to her feet but reports pain occasionally she is currently taking Lyrica.  she denies history of foot sores.     3.  ESRD  History of  donor kidney transplant at [facility] in [place]/22   Currently taking Losartan and amlodipine 10 mg daily  142/80  Dr. Villar   Latest Reference Range & Units 25 08:21   Micro Alb Creat Ratio 0 - 30 mg/g see below   Creatinine, Urine mg/dL 72.30   Microalbumin, Urine Random mg/dL <1.2        Latest Reference Range & Units 25 08:21   Bun 8 - 22 mg/dL 51 (H)   Creatinine 0.50 - 1.40 mg/dL 1.65 (H)   GFR (CKD-EPI) >60 mL/min/1.73 m 2 32 !      Latest Reference Range & Units 25 08:21 "   Potassium 3.6 - 5.5 mmol/L 4.7     4.  Cardiovascular disease:  Paroxysmal Atrial Fibrillation-She is taking Eliquis.   She is seeing at [facility]     5.  Hypothyroidism, acquired:   She is currently 88 mcg of levothyroxine daily  She takes it every morning on an empty stomach  Denies taking any iron, calcium, multivitamins, antiacids with the medication    Denies fatigue, constipation, dry skin, palpitations.    Latest Reference Range & Units 04/05/25 08:21   TSH 0.380 - 5.330 uIU/mL 1.090   Free T-4 0.93 - 1.70 ng/dL 1.63     6. Essential Hypertension:  FV by cardiology   Currently taking Norvasc 10 mg, carvedilol 25 mg, lisinopril 40 mg  BP in office today was 142/80  She has not taken her medications.   denies any dizziness, palpitations, chest pain    7.  Dyslipidemia:  She is currently taking statin-atorvastatin 20 mg daily   She still complaining of muscle aches especially in her lower legs   Latest Reference Range & Units 04/05/25 08:21   Cholesterol,Tot 100 - 199 mg/dL 134   Triglycerides 0 - 149 mg/dL 259 (H)   HDL >=40 mg/dL 35 !   LDL <100 mg/dL 47       8. Secondary hyperparathyroidism:  Currently not taking any vitamin D  Fv by Dr. Blackmon    Latest Reference Range & Units 04/05/25 08:21   Calcium 8.5 - 10.5 mg/dL 10.5   Correct Calcium 8.5 - 10.5 mg/dL 10.4      Latest Reference Range & Units 04/05/25 08:21   25-Hydroxy   Vitamin D 25 30 - 100 ng/mL 17 (L)      Latest Reference Range & Units 04/02/25 08:56   Pth, Intact 14.0 - 72.0 pg/mL 114.0 (H)         ROS:     CONS:     No fever, no chills, no weight loss, no fatigue   EYES:      No diplopia, no blurry vision, no redness of eyes, no swelling of eyelids   ENT:    No hearing loss, No ear pain, No sore throat, no dysphagia, no neck swelling   CV:     No chest pain, no palpitations, no claudication, no orthopnea, no PND   PULM:    No SOB, no cough, no hemoptysis, no wheezing    GI:   No nausea,  no vomiting, no diarrhea, no constipation, no bloody stools   :  Passing urine well, no dysuria, no hematuria   ENDO:   No polyuria, no polydipsia, no heat intolerance, no cold intolerance   NEURO: No headaches, no dizziness, no convulsions, no tremors   MUSC:  No joint swellings, no arthralgias, no myalgias, no weakness   SKIN:   No rash, no ulcers, no dry skin   PSYCH:   No depression, no anxiety, no difficulty sleeping       Past Medical History:  Patient Active Problem List    Diagnosis Date Noted    Lumbar radiculopathy 2025    CKD (chronic kidney disease) stage 4, GFR 15-29 ml/min ([facility]) 2024    Hyperglycemia 10/15/2024    Bradycardia 10/15/2024    Hyperkalemia 2024    Vitamin D deficiency 2024    Secondary hyperparathyroidism ([facility]) 2024    Polyneuropathy 2024    Immunosuppression due to drug therapy ([facility]) 12/15/2023    Infection due to ESBL-producing Escherichia coli 2023    Metabolic acidosis 2023    BK viremia 2023    Other hydronephrosis 2023    Long term current use of immunosuppressive drug 2023    Leg swelling 2023    -donor kidney transplant recipient 2022    Lung nodules 10/22/2022    GERD (gastroesophageal reflux disease) 10/19/2022    Normocytic anemia 2022    Secondary hypercoagulable state ([facility]) 2022    Paroxysmal atrial fibrillation ([facility]) 2021    Chronic heart failure with preserved ejection fraction ([facility]) 2020    Hypothyroidism, acquired 2020    Dental disorder 2020    Coronary artery disease with angina pectoris with documented spasm ([facility])     Mixed hyperlipidemia 2019    Elevated troponin 2018    RLS (restless legs syndrome) 2016    Type 2 diabetes mellitus with stage 4 chronic kidney disease, with long-term current use of insulin ([facility]) 2016    Primary hypertension 2013       Past Surgical History:  Past Surgical History:   Procedure  Laterality Date    URETERAL REIMPLANTATION  2023    transplant ureter reimplantation - [facility]    OTHER ABDOMINAL SURGERY Right 10/31/2022     Donor kidney transplant - [facility] CARDIAC CATH  2016    RCA stented with 2 Synergy drug-eluting stents.    RECOVERY  2016    Procedure: CATH LAB [facility] WITH POSSIBLE DR. CASTILLO;  Surgeon: Recoveryonly Surgery;  Location: SURGERY PRE-POST PROC UNIT [facility] [place];  Service:     ZZZ CARDIAC CATH  2016    100% RCA    OTHER Left 2014    left arm upper extremity fistula    OTHER ABDOMINAL SURGERY      left kidney removed due to cancer        Allergies:  Patient has no known allergies.     Current Medications:    Current Outpatient Medications:     Continuous Glucose  (FREESTYLE BALDOMERO 3 READER) Device, 1 Each every day., Disp: 1 Each, Rfl: 0    NOVOLOG FLEXPEN 100 UNIT/ML solution for injection, Inject 15 Units under the skin 3 times a day before meals., Disp: 45 mL, Rfl: 3    apixaban (ELIQUIS) 2.5mg Tab, Take 1 Tablet by mouth 2 times a day. TOME 1 TABLETA POR VIA ORAL DOS VECES AL DESMOND, Disp: 60 Tablet, Rfl: 5    metoprolol SR (TOPROL XL) 25 MG TABLET SR 24 HR, Take 25 mg by mouth every day., Disp: , Rfl:     gabapentin (NEURONTIN) 100 MG Cap, Take 100 mg by mouth 3 times a day., Disp: , Rfl:     LANTUS SOLOSTAR 100 UNIT/ML Solution Pen-injector injection, INJECT 10 UNITS UNDER THE SKIN 2 TIMES A DAY. 5 PENS PER MONTH, Disp: 18 mL, Rfl: 3    atorvastatin (LIPITOR) 20 MG Tab, TOME 1 TABLETA POR VIA ORAL TODOS LOS MCCORMICK EN LA NOCHE, Disp: 100 Tablet, Rfl: 3    tacrolimus (PROGRAF) 1 MG Cap, TAKE 1 CAPSULE BY MOUTH TWICE DAILY, Disp: 60 Capsule, Rfl: 11    polyethylene glycol 3350 (MIRALAX) 17 GM/SCOOP Powder, Romi bernardo vez al d a seg n sea necesario para el estre imiento (Patient not taking: Reported on 3/26/2025), Disp: 238 g, Rfl: 1    furosemide (LASIX) 80 MG Tab, Take 1 Tablet by mouth every day. (Patient taking differently:  Take 80 mg by mouth every day. 40 mg), Disp: 100 Tablet, Rfl: 3    acetaminophen (TYLENOL) 500 MG Tab, Take 500-1,000 mg by mouth every 6 hours as needed. (Patient not taking: Reported on 3/26/2025), Disp: , Rfl:     BD PEN NEEDLE PETE 2ND GEN, USE AS DIRECTED WITH INSULIN PENS SIX TIMES DAILY, Disp: 200 Each, Rfl: 3    triamcinolone acetonide (KENALOG) 0.1 % Cream, Aplique bernardo cantidad del tamano de un guisante sobre la piel bernardo vez al fernanda janna sea necesario para la picazon o el sarpullido., Disp: 45 g, Rfl: 2    amLODIPine (NORVASC) 10 MG Tab, TOME BERNARDO TABLETA TODOS LOS MCCORMICK, Disp: 90 Tablet, Rfl: 1    Continuous Glucose Sensor (FREESTYLE BALDOMERO 3 SENSOR) Misc, 1 Each every 14 days., Disp: 6 Each, Rfl: 3    levothyroxine (SYNTHROID) 88 MCG Tab, Take 1 Tablet by mouth every morning on an empty stomach., Disp: 90 Tablet, Rfl: 3    FARXIGA 5 MG Tab, Take 1 Tablet by mouth every day. Por diabetes, Disp: 90 Tablet, Rfl: 3    glucose blood (ACCU-CHEK GUIDE) strip, USE ONE COVERED STRIP TO TEST BLOOD SUGAR THREE TIMES DAILY., Disp: 100 Strip, Rfl: 3    Lancets, Use one covered lancet to test blood sugar three times daily., Disp: 100 Each, Rfl: 3    mycophenolate (CELLCEPT) 250 MG Cap, Take 1 Capsule by mouth 2 times a day., Disp: 20 Capsule, Rfl: 0    predniSONE (DELTASONE) 5 MG Tab, Take 5 mg by mouth every day., Disp: , Rfl:     Social History:  Social History     Socioeconomic History    Marital status:      Spouse name: Not on file    Number of children: Not on file    Years of education: Not on file    Highest education level: Not on file   Occupational History    Not on file   Tobacco Use    Smoking status: Never    Smokeless tobacco: Never   Vaping Use    Vaping status: Never Used   Substance and Sexual Activity    Alcohol use: No     Alcohol/week: 0.0 oz    Drug use: No    Sexual activity: Not Currently   Other Topics Concern    Not on file   Social History Narrative    Not on file     Social Drivers of  Health     Financial Resource Strain: Low Risk  (10/18/2024)    Overall Financial Resource Strain (CARDIA)     Difficulty of Paying Living Expenses: Not very hard   Food Insecurity: No Food Insecurity (10/18/2024)    Hunger Vital Sign     Worried About Running Out of Food in the Last Year: Never true     Ran Out of Food in the Last Year: Never true   Transportation Needs: No Transportation Needs (10/18/2024)    PRAPARE - Transportation     Lack of Transportation (Medical): No     Lack of Transportation (Non-Medical): No   Physical Activity: Inactive (4/9/2024)    Exercise Vital Sign     Days of Exercise per Week: 0 days     Minutes of Exercise per Session: 0 min   Stress: No Stress Concern Present (4/9/2024)    [employer] - Occupational Stress Questionnaire     Feeling of Stress : Only a little   Social Connections: Moderately Isolated (4/9/2024)    Social Connection and Isolation Panel [NHANES]     Frequency of Communication with Friends and Family: More than three times a week     Frequency of Social Gatherings with Friends and Family: More than three times a week     Attends [employer] Services: Never     Active Member of Clubs or Organizations: No     Attends Club or Organization Meetings: Never     Marital Status:    Intimate Partner Violence: Not At Risk (10/15/2024)    Humiliation, Afraid, Rape, and Kick questionnaire     Fear of Current or Ex-Partner: No     Emotionally Abused: No     Physically Abused: No     Sexually Abused: No   Housing Stability: Low Risk  (10/18/2024)    Housing Stability Vital Sign     Unable to Pay for Housing in the Last Year: No     Number of Times Moved in the Last Year: 1     Homeless in the Last Year: No        Family History:   Family History   Problem Relation Age of Onset    Diabetes Sister     Other Sister         liver disease    Diabetes Brother     Heart Disease Neg Hx        PHYSICAL EXAM:   Vital signs: BP (!) 142/80 (BP Location: Left  arm, Patient Position: Sitting, BP Cuff Size: Adult)   Pulse (!) 52   Wt 54 kg (119 lb)   LMP  (LMP Unknown)   SpO2 96%   BMI 24.04 kg/m²   GENERAL: Well-developed, well-nourished  in no apparent distress.   FOOT: Normal sensation to monofilament testing, normal pulses, no ulcers.  Normal Vibration quantitative sensation test.    Labs:  Lab Results   Component Value Date/Time    HBA1C 5.7 (A) 12/08/2021 1520    AVGLUC 111 04/28/2021 0937     Lab Results   Component Value Date/Time    CHOLSTRLTOT 125 09/29/2020 1056    TRIGLYCERIDE 113 09/29/2020 1056    HDL 48 09/29/2020 1056    LDL 54 09/29/2020 1056       Lab Results   Component Value Date/Time    MALBCRT see below 04/05/2025 08:21 AM    MICROALBUR <1.2 04/05/2025 08:21 AM        Lab Results   Component Value Date/Time    TSHULTRASEN 4.150 08/12/2021 0201     Lab Results   Component Value Date/Time    FREEDIR 1.52 01/28/2021 0700         ASSESSMENT/PLAN:   1. Type 2 diabetes mellitus with other specified complication, with long-term current use of insulin (HCC)  Unstable  A1c 11.1 in clinic today  Extensive discussion on the use of insulin pens. Lantus vs Novolog and when to take them.   Medication:  Lantus 10 units in am and pm - change to lantus 25 units BID  Novolog 15 units - continue  Farxiga 5 mg daily - continue  Extensive discussion with interperter for lizettent to understands how to pair sensor with reader.   I will send for a new reader as her reader is unable to charge.   Patient and family understands to stay well hydrated to prevent UTIs and yeast infections with farxiga  Recommend diet low in fats and carbs  Recommend 150mins/activity weeky  Recommend checking feet daily  - POCT Hemoglobin A1C  - Continuous Glucose  (FREESTYLE BALDOMERO 3 READER) Device; 1 Each every day.  Dispense: 1 Each; Refill: 0  - Referral to Pharmacotherapy Service  - NOVOLOG FLEXPEN 100 UNIT/ML solution for injection; Inject 15 Units under the skin 3 times a day  before meals.  Dispense: 45 mL; Refill: 3    2. Dyslipidemia  Unstable  Continue regimen-HPI    3. Polyneuropathy  Stable  Continue appointment-HPI    4. ESRD (end stage renal disease) (HCC)  Unstable  Followed by nephrology    5. Essential hypertension  Stable  Continue current regimen- see HPI  Followed by cardiology    6. Hypothyroidism, acquired  Stable  Medication:  Levothyroxine 88 mcg daily - continue  Patient understands to take the medication on empty stomach.     7. Secondary hyperparathyroidism (HCC)  This is due to ESRD.   We will continue to monitor.     8. Vitamin D deficiency  Unstable  This is followed by nephrology       Disposition: Return in about 3 months (around 7/16/2025). I will set patient up with pharmacotherapy for better control of her diabetes.     Do blood work 2 week prior to next appointment with me    I spent approximately 50 minutes with the patient face-to-face more than 50% of which time was spent on counseling on the diagnosis and treatment and review of her risks and prognosis.  I discussed current status, physical exam findings, and plan of care.  Answered all of her questions.  The patient understands and agrees with the treatment plan.     Thank you kindly for allowing me to participate in the diabetes care plan for this patient.    ANGELITA Avalos  4/16/25    CC:   ANGELITA Concepcion

## 2025-04-18 ENCOUNTER — TELEPHONE (OUTPATIENT)
Dept: PHARMACY | Facility: MEDICAL CENTER | Age: 75
End: 2025-04-18
Payer: MEDICARE

## 2025-04-18 DIAGNOSIS — Z79.4 TYPE 2 DIABETES MELLITUS WITH OTHER SPECIFIED COMPLICATION, WITH LONG-TERM CURRENT USE OF INSULIN (HCC): ICD-10-CM

## 2025-04-18 DIAGNOSIS — E11.69 TYPE 2 DIABETES MELLITUS WITH OTHER SPECIFIED COMPLICATION, WITH LONG-TERM CURRENT USE OF INSULIN (HCC): ICD-10-CM

## 2025-04-18 PROCEDURE — RXMED WILLOW AMBULATORY MEDICATION CHARGE: Performed by: STUDENT IN AN ORGANIZED HEALTH CARE EDUCATION/TRAINING PROGRAM

## 2025-04-18 RX ORDER — KETOROLAC TROMETHAMINE 30 MG/ML
1 INJECTION, SOLUTION INTRAMUSCULAR; INTRAVENOUS CONTINUOUS
Qty: 1 EACH | Refills: 0 | Status: SHIPPED | OUTPATIENT
Start: 2025-04-18

## 2025-04-18 RX ORDER — HYDROCHLOROTHIAZIDE 12.5 MG/1
CAPSULE ORAL
Qty: 6 EACH | Refills: 4 | Status: SHIPPED | OUTPATIENT
Start: 2025-04-18

## 2025-04-18 NOTE — TELEPHONE ENCOUNTER
Prior Authorization for Freestyle Pearl 3 Sensors  has been approved for a quantity of 6 , day supply 84    Prior Authorization reference number: [id number]  Insurance: [employer]  Effective dates: 04/18/2025-04/18/2028  Copay: $85.33     Is patient eligible to fill with [facility]? Yes    Next Steps: The patient's copay exceeds $5.00. Proceed with contacting patient to offer financial assistance.    Prior Authorization has been submitted via Cover My Meds: Key ([id number])    Will follow up in 24-48 business hours.     Received New Start PA request via  [employer]   for Freestyle Pearl 3 Sensors . (Quantity:2, Day Supply:28)     Insurance: [employer]   Member ID:  [id number]  BIN: [id number]  PCN: [id number]  Group: [facility]     Ran Test claim via [place] & medication Rejects stating prior authorization is required.    Lore Rodriguez, Pharmacy Liaison/ RX Coordinator

## 2025-04-18 NOTE — TELEPHONE ENCOUNTER
Prior Authorization for Freestyle Pearl 3 [place]  has been approved for a quantity of 1 , day supply 90    Prior Authorization reference number: [id number]  Insurance: [employer]  Effective dates: 04/18/25-04/18/2028  Copay: $13.92     Is patient eligible to fill with [facility]? Yes    Next Steps: The patient's copay exceeds $5.00. Proceed with contacting patient to offer financial assistance.    Prior Authorization has been submitted via Cover My Meds: Key ([id number])    Will follow up in 24-48 business hours.     Received New Start PA request via  [employer]   for Freestyle Pearl 3 [place]. (Quantity:1, Day Supply:90)     Insurance: [employer]  Member ID:  [id number]  BIN: [id number]  PCN: [id number]  Group: [facility]     Ran Test claim via [place] & medication Rejects stating prior authorization is required.    Lore Rodriguez, Pharmacy Liaison/ RX Coordinator

## 2025-04-18 NOTE — TELEPHONE ENCOUNTER
Contact:  Phone number:[phone] (mobile)    Name of person spoken with and relationship to patient: Manoj Soares    Patient’s Adherence:  How patient is doing on medication: Very Well    How many missed doses and reason: 0 N/A    Any new medications: No    Any new conditions: No    Any new allergies: No    Any new side effects: No    Any new diagnoses: No    How many doses remainin    Did patient want to speak with pharmacist: No   Delivery:  Delivery date and method: 2025 via     Needs by Date: 2025    Signature required: No     Any additional details for : N/A   Teach Appointment Date:  N/A   Shipping Address:  [address] [place] [id number]   Medication(name,strength and dose):  FreeStyle Pearl 3 Sensor Miscellaneous FreeStyle Pearl 3 [place] Device    Copay:  $88.91   Payment Method:  Send patient the bill   Supplies:  NONE   Additional Information:  NONE       Lore Rodriguez, Pharmacy Liaison/ RX Coordinator

## 2025-04-21 ENCOUNTER — PHARMACY VISIT (OUTPATIENT)
Dept: PHARMACY | Facility: MEDICAL CENTER | Age: 75
End: 2025-04-21
Payer: COMMERCIAL

## 2025-04-22 ENCOUNTER — OFFICE VISIT (OUTPATIENT)
Dept: NEPHROLOGY | Facility: MEDICAL CENTER | Age: 75
End: 2025-04-22
Payer: MEDICARE

## 2025-04-22 VITALS
HEART RATE: 56 BPM | WEIGHT: 119.6 LBS | RESPIRATION RATE: 12 BRPM | DIASTOLIC BLOOD PRESSURE: 50 MMHG | TEMPERATURE: 98.3 F | HEIGHT: 58 IN | BODY MASS INDEX: 25.11 KG/M2 | OXYGEN SATURATION: 96 % | SYSTOLIC BLOOD PRESSURE: 132 MMHG

## 2025-04-22 DIAGNOSIS — E11.22 TYPE 2 DIABETES MELLITUS WITH STAGE 4 CHRONIC KIDNEY DISEASE, WITH LONG-TERM CURRENT USE OF INSULIN (HCC): ICD-10-CM

## 2025-04-22 DIAGNOSIS — B34.8 BK VIREMIA: ICD-10-CM

## 2025-04-22 DIAGNOSIS — N25.81 SECONDARY HYPERPARATHYROIDISM (HCC): ICD-10-CM

## 2025-04-22 DIAGNOSIS — Z94.0 IMMUNOSUPPRESSIVE MANAGEMENT ENCOUNTER FOLLOWING KIDNEY TRANSPLANT: Chronic | ICD-10-CM

## 2025-04-22 DIAGNOSIS — R30.0 DYSURIA: ICD-10-CM

## 2025-04-22 DIAGNOSIS — Z94.0 DECEASED-DONOR KIDNEY TRANSPLANT RECIPIENT: Chronic | ICD-10-CM

## 2025-04-22 DIAGNOSIS — N18.4 CKD (CHRONIC KIDNEY DISEASE) STAGE 4, GFR 15-29 ML/MIN (HCC): ICD-10-CM

## 2025-04-22 DIAGNOSIS — I10 PRIMARY HYPERTENSION: Chronic | ICD-10-CM

## 2025-04-22 DIAGNOSIS — Z79.899 IMMUNOSUPPRESSIVE MANAGEMENT ENCOUNTER FOLLOWING KIDNEY TRANSPLANT: Chronic | ICD-10-CM

## 2025-04-22 DIAGNOSIS — E55.9 VITAMIN D DEFICIENCY: ICD-10-CM

## 2025-04-22 DIAGNOSIS — N18.4 TYPE 2 DIABETES MELLITUS WITH STAGE 4 CHRONIC KIDNEY DISEASE, WITH LONG-TERM CURRENT USE OF INSULIN (HCC): ICD-10-CM

## 2025-04-22 DIAGNOSIS — Z79.4 TYPE 2 DIABETES MELLITUS WITH STAGE 4 CHRONIC KIDNEY DISEASE, WITH LONG-TERM CURRENT USE OF INSULIN (HCC): ICD-10-CM

## 2025-04-22 PROCEDURE — 3078F DIAST BP <80 MM HG: CPT | Performed by: INTERNAL MEDICINE

## 2025-04-22 PROCEDURE — G2211 COMPLEX E/M VISIT ADD ON: HCPCS | Performed by: INTERNAL MEDICINE

## 2025-04-22 PROCEDURE — 99214 OFFICE O/P EST MOD 30 MIN: CPT | Performed by: INTERNAL MEDICINE

## 2025-04-22 PROCEDURE — 3075F SYST BP GE 130 - 139MM HG: CPT | Performed by: INTERNAL MEDICINE

## 2025-04-22 RX ORDER — ERGOCALCIFEROL 1.25 MG/1
50000 CAPSULE, LIQUID FILLED ORAL
Qty: 12 CAPSULE | Refills: 0 | Status: SHIPPED | OUTPATIENT
Start: 2025-04-22

## 2025-04-22 RX ORDER — SODIUM BICARBONATE 650 MG/1
650 TABLET ORAL 2 TIMES DAILY
Qty: 180 TABLET | Refills: 3 | Status: SHIPPED | OUTPATIENT
Start: 2025-04-22

## 2025-04-22 ASSESSMENT — ENCOUNTER SYMPTOMS
SHORTNESS OF BREATH: 0
FEVER: 0
ABDOMINAL PAIN: 1

## 2025-04-22 ASSESSMENT — FIBROSIS 4 INDEX: FIB4 SCORE: 2.44

## 2025-04-22 NOTE — PROGRESS NOTES
Chief Complaint   Patient presents with    Chronic Kidney Disease       CC:  follow-up CKD and transplant     services were used in the patient's primary language of English.   Name or Number: Joselin [id number]  Mode of interpretation: iPad      HPI:  Tere Gallegos is a 74 y.o. female with a history of end-stage renal disease status post  donor kidney transplant 10/31/2022 at [facility] notable for delayed graft function requiring dialysis until mid 2022, also complicated by a distal ureteral stricture requiring percutaneous transplant nephrostomy followed by ureteral stenting in May 2023 and ureteral re-implantation on 23, diabetes, hypertension, permanent atrial fibrillation, pancytopenia, BK viremia who presents today for follow-up.    Patient was hospitalized in mid May 2023.  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  She required percutaneous nephrostomy on 2023.  She was then hospitalized at [facility] in [place] in late May, 2023. She had PCN removed and ureteral stent placed.  She had ureteral reimplantation done 2023.    Patient hospitalized in early 2023 with chest pain, atrial fibrillation with RVR, improved with medical management. She was admitted in late 2023 with concern for UTI or abscess.     No recent hospitalizations.     Re: ESRD s/p transplant. Patient was on dialysis from  - .  The etiology of ESRD was thought to be due to diabetes, hypertension, and solitary kidney.  Patient was anuric prior to kidney transplant 10/31/2022.  Patient had delayed graft function and required dialysis until 2022.  Posttransplant course has been complicated by pancytopenia and BK viremia and BK viruria.   Denies taking NSAIDs. She is still taking sodium bicarb 650mg bid. She does complain of some pain over her transplant and has some dysuria. She also has urinary frequency, was relieved a little when she took cipro  250mg BID for 7 days, but it came back.     Re: immunosuppression, patient was induced with Simulect on 10/31/2022.  Patient was originally on mycophenolate 1000 mg p.o. twice daily, tacrolimus 1 mg p.o. twice daily, and prednisone 10 mg daily shortly after transplant.  Her dosages were weaned down. She remains on prednisone 5mg daily, Tacrolimus 1mg BID, and MMF 250mg BID.      Re: hypertension, she denies LE edema currently.  She checks BP at home usually 120-130's / 60's.    She's remains on amlodipine 10mg daily, lasix 80mg daily (two 40mg tabs, and she feels diuretic effect). She was taken off losartan in 10/2024 due to high K.     Re: DM2, she remains on insulin. Says her sugars are high, needing more insulin.  She is using lantus BID and novolog tid. I educated patient to use Novolog qACHS.         Past Medical History:   Diagnosis Date    Acquired hypothyroidism 2020    CAD (coronary artery disease)     Chronic diastolic heart failure (HCC) 2020    Coronary artery disease due to lipid rich plaque     2 Synergy NOEMI to 100% RCA stent placed    Dental disorder     partial dentures- uppers    Diabetes ([facility])     oral medication    ESRD (end stage renal disease) on dialysis ([facility]) 2020    Hemodialysis patient ([facility])     M, W, F    Hyperlipidemia     Hypertension     Kidney transplant candidate     Kidney transplant recipient 10/31/2022    Presence of drug-eluting stent in right coronary artery     QT prolongation 2020    RLS (restless legs syndrome) 2016    Transaminitis 2018     Past Surgical History:   Procedure Laterality Date    URETERAL REIMPLANTATION  2023    transplant ureter reimplantation - [facility]    OTHER ABDOMINAL SURGERY Right 10/31/2022     Donor kidney transplant - [facility]    ZZZ CARDIAC CATH  2016    RCA stented with 2 Synergy drug-eluting stents.    RECOVERY  2016    Procedure: CATH LAB [facility] WITH POSSIBLE DR. CASTILLO;   Surgeon: Recoveryonly Surgery;  Location: SURGERY PRE-POST PROC UNIT [facility];  Service:     ZZZ CARDIAC CATH  08/16/2016    100% RCA    OTHER Left 2014    left arm upper extremity fistula    OTHER ABDOMINAL SURGERY      left kidney removed due to cancer         Outpatient Encounter Medications as of 4/22/2025   Medication Sig Dispense Refill    Continuous Glucose Sensor (FREESTYLE BALDOMERO 3 PLUS SENSOR) Misc Apply every 15 days 6 Each 4    Continuous Glucose  (FREESTYLE BALDOMERO 3 READER) Device Use 1 each continuously. 1 Each 0    NOVOLOG FLEXPEN 100 UNIT/ML solution for injection Inject 15 Units under the skin 3 times a day before meals. 45 mL 3    apixaban (ELIQUIS) 2.5mg Tab Take 1 Tablet by mouth 2 times a day. TOME 1 TABLETA POR VIA ORAL DOS VECES AL DESMOND 60 Tablet 5    metoprolol SR (TOPROL XL) 25 MG TABLET SR 24 HR Take 25 mg by mouth every day.      gabapentin (NEURONTIN) 100 MG Cap Take 100 mg by mouth 3 times a day.      LANTUS SOLOSTAR 100 UNIT/ML Solution Pen-injector injection INJECT 10 UNITS UNDER THE SKIN 2 TIMES A DAY. 5 PENS PER MONTH 18 mL 3    atorvastatin (LIPITOR) 20 MG Tab TOME 1 TABLETA POR VIA ORAL TODOS LOS MCCORMICK EN LA NOCHE 100 Tablet 3    tacrolimus (PROGRAF) 1 MG Cap TAKE 1 CAPSULE BY MOUTH TWICE DAILY 60 Capsule 11    polyethylene glycol 3350 (MIRALAX) 17 GM/SCOOP Powder Romi bernardo vez al d a seg n sea necesario para el estre imiento 238 g 1    furosemide (LASIX) 80 MG Tab Take 1 Tablet by mouth every day. (Patient taking differently: Take 80 mg by mouth every day. 40 mg) 100 Tablet 3    acetaminophen (TYLENOL) 500 MG Tab Take 500-1,000 mg by mouth every 6 hours as needed.      BD PEN NEEDLE PETE 2ND GEN USE AS DIRECTED WITH INSULIN PENS SIX TIMES DAILY 200 Each 3    triamcinolone acetonide (KENALOG) 0.1 % Cream Aplique bernardo cantidad del tamano de un guisante sobre la piel bernardo vez al desmond janna sea necesario para la picazon o el sarpullido. 45 g 2    amLODIPine (NORVASC) 10 MG Tab TOME BERNARDO  "TRACY KIM MCCORMICK 90 Tablet 1    Continuous Glucose Sensor (FREESTYLE BALDOMERO 3 SENSOR) Misc 1 Each every 14 days. 6 Each 3    levothyroxine (SYNTHROID) 88 MCG Tab Take 1 Tablet by mouth every morning on an empty stomach. 90 Tablet 3    FARXIGA 5 MG Tab Take 1 Tablet by mouth every day. Por diabetes 90 Tablet 3    glucose blood (ACCU-CHEK GUIDE) strip USE ONE COVERED STRIP TO TEST BLOOD SUGAR THREE TIMES DAILY. 100 Strip 3    Lancets Use one covered lancet to test blood sugar three times daily. 100 Each 3    mycophenolate (CELLCEPT) 250 MG Cap Take 1 Capsule by mouth 2 times a day. 20 Capsule 0    predniSONE (DELTASONE) 5 MG Tab Take 5 mg by mouth every day.       No facility-administered encounter medications on file as of 4/22/2025.        No Known Allergies    Review of Systems   Constitutional:  Negative for fever.   Respiratory:  Negative for shortness of breath.    Cardiovascular:  Negative for chest pain.   Gastrointestinal:  Positive for abdominal pain (some RLQ pain over allograft).   Genitourinary:  Positive for dysuria. Negative for hematuria.   All other systems reviewed and are negative.      /50 (BP Location: Left arm, Patient Position: Sitting, BP Cuff Size: Adult)   Pulse (!) 56   Temp 36.8 °C (98.3 °F) (Temporal)   Resp 12   Ht 1.473 m (4' 10\")   Wt 54.3 kg (119 lb 9.6 oz)   LMP  (LMP Unknown)   SpO2 96%   BMI 25.00 kg/m²     Physical Exam  Constitutional:       General: She is not in acute distress.  HENT:      Mouth/Throat:      Pharynx: No oropharyngeal exudate.   Eyes:      General: No scleral icterus.  Neck:      Trachea: No tracheal deviation.   Cardiovascular:      Rate and Rhythm: Normal rate and regular rhythm.      Heart sounds: Murmur heard.   Pulmonary:      Effort: Pulmonary effort is normal.      Breath sounds: Normal breath sounds. No stridor. No rales.   Abdominal:      General: Bowel sounds are normal.      Palpations: Abdomen is soft.      Tenderness: There is no " abdominal tenderness.   Musculoskeletal:         General: Normal range of motion.      Cervical back: Neck supple.      Right lower leg: No edema.      Left lower leg: No edema.   Skin:     General: Skin is warm and dry.      Findings: No rash.   Neurological:      General: No focal deficit present.      Mental Status: She is alert and oriented to person, place, and time.   Psychiatric:         Mood and Affect: Mood and affect normal.         Behavior: Behavior normal.   Dialysis access: Left brachiobasilic AV fistula, with patent bruit and thrill.   Good collapsibility on arm raise      Labs reviewed.  Recent Labs     08/12/24  0733 09/23/24  0054 02/22/25  0856 04/02/25  0856 04/05/25  0821   ALBUMIN 4.2   < > 4.2 4.3  4.2 4.1   HDL 42  --   --  39* 35*   TRIGLYCERIDE 226*  --   --  233* 259*   SODIUM 136   < > 139 135  133* 135   POTASSIUM 5.0   < > 4.9 4.4  4.3 4.7   CHLORIDE 103   < > 106 101  100 101   CO2 19*   < > 22 15*  21 17*   BUN 29*   < > 62* 38*  37* 51*   CREATININE 1.47*   < > 2.12* 1.56*  1.49* 1.65*   PHOSPHORUS  --    < > 3.8  3.8 3.2  --     < > = values in this interval not displayed.       Lab Results   Component Value Date/Time    WBC 5.5 04/02/2025 08:56 AM    RBC 5.53 (H) 04/02/2025 08:56 AM    HEMOGLOBIN 14.8 04/02/2025 08:56 AM    HEMATOCRIT 47.9 (H) 04/02/2025 08:56 AM    MCV 86.6 04/02/2025 08:56 AM    MCH 26.8 (L) 04/02/2025 08:56 AM    MCHC 30.9 (L) 04/02/2025 08:56 AM    MPV 12.6 04/02/2025 08:56 AM                  URINALYSIS:  Lab Results   Component Value Date/Time    COLORURINE Yellow 12/07/2024 0837    CLARITY Clear 12/07/2024 0837    SPECGRAVITY 1.015 12/07/2024 0837    PHURINE 5.5 12/07/2024 0837    KETONES Negative 12/07/2024 0837    PROTEINURIN Negative 12/07/2024 0837    BILIRUBINUR Negative 12/07/2024 0837    UROBILU 0.2 10/15/2024 1053    NITRITE Positive (A) 12/07/2024 0837    LEUKESTERAS Small (A) 12/07/2024 0837    OCCULTBLOOD Negative 12/07/2024 0837        [facility]  Lab Results   Component Value Date/Time    TOTPROTUR 16.0 (H) 2023 0707      Lab Results   Component Value Date/Time    CREATININEU 81.98 2023 07             Imaging report(s) reviewed  No orders to display         Assessment:  Tere Gallegos is a 74 y.o. female with a history of end-stage renal disease status post  donor kidney transplant 10/31/2022 at [facility] notable for delayed graft function requiring dialysis until mid 2022, also complicated by a distal ureteral stricture requiring percutaneous transplant nephrostomy followed by ureteral stenting in May 2023 and ureteral re-implantation on 23, diabetes, hypertension, permanent atrial fibrillation, pancytopenia, BK viremia who presents today for follow-up.      Plan:  1. ESRD s/p kidney transplant 10/31/22  -Original ESRD diagnosis from solitary kidney, diabetes, hypertension.  Started hemodialysis in .  Patient remains with functioning kidney transplant.  She remains off of dialysis since 2022.      2.  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  I recommend avoiding NSAIDs and other toxins.  I recommend a low-sodium diet.  Patient unable to tolerate losartan due to hyperkalemia.  Patient says she would refuse dialysis if her kidney transplant fails, she would prefer to die peacefully.    3.  Hypertension  - Patient unable to tolerate losartan due to hyperkalemia.  I recommend maintaining Lasix 80 mg daily.  Continue amlodipine.  Patient is on low-dose metoprolol, because she has bradycardia with higher doses of metoprolol.    4. Permanent atrial fibrillation (HCC)  -Rate controlled with low-dose of metoprolol.  Patient remains on Eliquis for anticoagulation.    5. Long term current use of immunosuppressive drug complicated by BK viremia  - Recommend continue tacrolimus 1 mg p.o.  twice daily.  Maintain mycophenolate 250 mg p.o. twice daily and prednisone 5 mg p.o. once daily.  She does have BK viruria and viremia.  Will continue to balance immunosuppression with the risk of BK viremia.    6. Pancytopenia (HCC)  -She leukopenia and anemia have improved.  She still has mild thrombocytopenia.  Patient had worse pancytopenia on higher doses of mycophenolate, so I recommend maintaining mycophenolate 250 mg p.o. twice daily.   She had known pancytopenia even prior to transplant, and had bone marrow biopsy prior to transplant showing no dysplasia.    7.  ESBL E. coli urinary colonization  - Noted persistently.  Patient does complain of dysuria today, so I recommend repeat urinalysis and urine culture.    8.  Hyperkalemia  - Controlled.  I recommend continue lasix 80 mg daily.  I recommend continue to avoid ACE inhibitor or ARB.    9.  Metabolic acidosis  - Uncontrolled.  Resume sodium bicarbonate 650mg BID.     10.  Type 2 diabetes  - Uncontrolled with hyperglycemia.  I recommend maintaining Farxiga 5 mg daily.  I reeducated patient today to take her mealtime insulin before her meals, not separate from her meals.  She continues to follow with endocrinology.      Return to clinic in 4 months with pre-clinic labs, and continue to bring home medications and blood pressure log, and ideally family member that knows her medications.      Priyank Villar MD  Nephrology  [facility]

## 2025-04-22 NOTE — Clinical Note
Hello! Ms. Kenyetta Gallegos is interested again in injection for left leg pain. I have no objections from kidney transplant standpoint.  Priyank Villar MD Nephrology [facility]

## 2025-04-26 ENCOUNTER — HOSPITAL ENCOUNTER (OUTPATIENT)
Facility: MEDICAL CENTER | Age: 75
End: 2025-04-26
Attending: INTERNAL MEDICINE
Payer: MEDICARE

## 2025-04-26 DIAGNOSIS — R30.0 DYSURIA: ICD-10-CM

## 2025-04-26 LAB
APPEARANCE UR: CLEAR
BACTERIA #/AREA URNS HPF: ABNORMAL /HPF
BILIRUB UR QL STRIP.AUTO: NEGATIVE
CASTS URNS QL MICRO: ABNORMAL /LPF (ref 0–2)
COLOR UR: YELLOW
EPITHELIAL CELLS 1715: ABNORMAL /HPF (ref 0–5)
GLUCOSE UR STRIP.AUTO-MCNC: >=1000 MG/DL
KETONES UR STRIP.AUTO-MCNC: NEGATIVE MG/DL
LEUKOCYTE ESTERASE UR QL STRIP.AUTO: ABNORMAL
MICRO URNS: ABNORMAL
NITRITE UR QL STRIP.AUTO: NEGATIVE
PH UR STRIP.AUTO: 6 [PH] (ref 5–8)
PROT UR QL STRIP: NEGATIVE MG/DL
RBC # URNS HPF: ABNORMAL /HPF
RBC UR QL AUTO: NEGATIVE
SP GR UR STRIP.AUTO: 1.02
UROBILINOGEN UR STRIP.AUTO-MCNC: 0.2 EU/DL
WBC #/AREA URNS HPF: ABNORMAL /HPF

## 2025-04-26 PROCEDURE — 81001 URINALYSIS AUTO W/SCOPE: CPT

## 2025-05-14 ENCOUNTER — OFFICE VISIT (OUTPATIENT)
Dept: CARDIOLOGY | Facility: MEDICAL CENTER | Age: 75
End: 2025-05-14
Attending: INTERNAL MEDICINE
Payer: MEDICARE

## 2025-05-14 VITALS
HEART RATE: 115 BPM | OXYGEN SATURATION: 97 % | WEIGHT: 119 LBS | SYSTOLIC BLOOD PRESSURE: 104 MMHG | HEIGHT: 58 IN | BODY MASS INDEX: 24.98 KG/M2 | DIASTOLIC BLOOD PRESSURE: 68 MMHG

## 2025-05-14 DIAGNOSIS — I25.83 CORONARY ARTERY DISEASE DUE TO LIPID RICH PLAQUE: ICD-10-CM

## 2025-05-14 DIAGNOSIS — I48.0 PAF (PAROXYSMAL ATRIAL FIBRILLATION) (HCC): Primary | ICD-10-CM

## 2025-05-14 DIAGNOSIS — R07.2 PRECORDIAL PAIN: ICD-10-CM

## 2025-05-14 DIAGNOSIS — I25.10 CORONARY ARTERY DISEASE DUE TO LIPID RICH PLAQUE: ICD-10-CM

## 2025-05-14 DIAGNOSIS — N18.31 STAGE 3A CHRONIC KIDNEY DISEASE: ICD-10-CM

## 2025-05-14 DIAGNOSIS — I50.32 CHRONIC HEART FAILURE WITH PRESERVED EJECTION FRACTION (HCC): ICD-10-CM

## 2025-05-14 DIAGNOSIS — Z79.899 IMMUNOSUPPRESSION DUE TO DRUG THERAPY (HCC): ICD-10-CM

## 2025-05-14 DIAGNOSIS — D84.821 IMMUNOSUPPRESSION DUE TO DRUG THERAPY (HCC): ICD-10-CM

## 2025-05-14 LAB — EKG IMPRESSION: NORMAL

## 2025-05-14 PROCEDURE — 93005 ELECTROCARDIOGRAM TRACING: CPT | Mod: TC | Performed by: INTERNAL MEDICINE

## 2025-05-14 PROCEDURE — 3074F SYST BP LT 130 MM HG: CPT | Performed by: INTERNAL MEDICINE

## 2025-05-14 PROCEDURE — 93010 ELECTROCARDIOGRAM REPORT: CPT | Performed by: INTERNAL MEDICINE

## 2025-05-14 PROCEDURE — 99213 OFFICE O/P EST LOW 20 MIN: CPT | Performed by: INTERNAL MEDICINE

## 2025-05-14 PROCEDURE — 3078F DIAST BP <80 MM HG: CPT | Performed by: INTERNAL MEDICINE

## 2025-05-14 PROCEDURE — G2211 COMPLEX E/M VISIT ADD ON: HCPCS | Performed by: INTERNAL MEDICINE

## 2025-05-14 PROCEDURE — 99215 OFFICE O/P EST HI 40 MIN: CPT | Performed by: INTERNAL MEDICINE

## 2025-05-14 ASSESSMENT — FIBROSIS 4 INDEX: FIB4 SCORE: 2.44

## 2025-05-15 NOTE — PROGRESS NOTES
"INTERVENTIONAL CARDIOLOGY OUTPATIENT FOLLOWUP    PCP: MAGLORZATA Concepcion.    1. PAF (paroxysmal atrial fibrillation) (HCC)    2. Coronary artery disease due to lipid rich plaque    3. Chronic heart failure with preserved ejection fraction (HCC)    4. Stage 3a chronic kidney disease    5. Immunosuppression due to drug therapy (HCC)        Tere Gallegos experienced an episode of prolonged chest discomfort a few weeks ago of undetermined significance.  I will have her complete a Zio patch and stress test.     Follow up: 3 months    History: Tere Gallegos is a 74 y.o. female  female with PAF, RCA , normal LVEF, renal transplant and diabetes presenting for follow-up.     She has felt well for the past 2 weeks but before that had symptoms of chest discomfort.  This lasted for 3 days before resolving.  She felt similar to prior coronary artery disease though not as intense.      Physical Exam:  /68 (BP Location: Left arm, Patient Position: Sitting, BP Cuff Size: Adult)   Pulse (!) 115   Ht 1.473 m (4' 10\")   Wt 54 kg (119 lb)   LMP  (LMP Unknown)   SpO2 97%   BMI 24.87 kg/m²   GEN: NAD  RESP: CTAB  CVS: RRR, No M/R/G  ABD: Soft, NT/ND  EXT: WWP, no edema    Today's encounter addressed an illness with threat to life/bodily function unstable coronary artery disease distribution angina  () Today's E/M visit is associated with medical care services that serve as the continuing focal point for all needed health care services and/or with medical care services that  are part of ongoing care related to a patient's single, serious condition, or a complex condition: This includes  furnishing services to patients on an ongoing basis that result in care that is personalized  to the patient. The services result in a comprehensive, longitudinal, and continuous  relationship with the patient and involve delivery of team-based care that is accessible, coordinated with other practitioners " and providers, and integrated with the broader health  care landscape.     The ASCVD Risk score (Jose DK, et al., 2019) failed to calculate.    Studies  Lab Results   Component Value Date/Time    CHOLSTRLTOT 134 04/05/2025 08:21 AM    LDL 47 04/05/2025 08:21 AM    HDL 35 (A) 04/05/2025 08:21 AM    TRIGLYCERIDE 259 (H) 04/05/2025 08:21 AM       Lab Results   Component Value Date/Time    SODIUM 135 04/05/2025 08:21 AM    POTASSIUM 4.7 04/05/2025 08:21 AM    CHLORIDE 101 04/05/2025 08:21 AM    CO2 17 (L) 04/05/2025 08:21 AM    GLUCOSE 237 (H) 04/05/2025 08:21 AM    BUN 51 (H) 04/05/2025 08:21 AM    CREATININE 1.65 (H) 04/05/2025 08:21 AM    BUNCREATRAT 8 (L) 01/28/2021 07:00 AM      Lab Results   Component Value Date/Time    PROTHROMBTM 13.3 07/28/2023 11:10 AM    INR 0.98 07/28/2023 11:10 AM      Lab Results   Component Value Date/Time    WBC 5.5 04/02/2025 08:56 AM    RBC 5.53 (H) 04/02/2025 08:56 AM    HEMOGLOBIN 14.8 04/02/2025 08:56 AM    HEMATOCRIT 47.9 (H) 04/02/2025 08:56 AM    MCV 86.6 04/02/2025 08:56 AM    MCH 26.8 (L) 04/02/2025 08:56 AM    MCHC 30.9 (L) 04/02/2025 08:56 AM    MPV 12.6 04/02/2025 08:56 AM    NEUTSPOLYS 83.00 (H) 01/04/2025 08:11 AM    LYMPHOCYTES 7.80 (L) 01/04/2025 08:11 AM    MONOCYTES 7.60 01/04/2025 08:11 AM    EOSINOPHILS 0.70 01/04/2025 08:11 AM    BASOPHILS 0.50 01/04/2025 08:11 AM    HYPOCHROMIA 1+ 10/21/2024 07:08 AM    ANISOCYTOSIS 1+ 02/28/2023 07:31 AM        Past Medical History[1]  Allergies[2]  Encounter Medications[3]  Social History     Socioeconomic History    Marital status:      Spouse name: Not on file    Number of children: Not on file    Years of education: Not on file    Highest education level: Not on file   Occupational History    Not on file   Tobacco Use    Smoking status: Never    Smokeless tobacco: Never   Vaping Use    Vaping status: Never Used   Substance and Sexual Activity    Alcohol use: No     Alcohol/week: 0.0 oz    Drug use: No    Sexual  activity: Not Currently   Other Topics Concern    Not on file   Social History Narrative    Not on file     Social Drivers of Health     Financial Resource Strain: Low Risk  (10/18/2024)    Overall Financial Resource Strain (CARDIA)     Difficulty of Paying Living Expenses: Not very hard   Food Insecurity: No Food Insecurity (10/18/2024)    Hunger Vital Sign     Worried About Running Out of Food in the Last Year: Never true     Ran Out of Food in the Last Year: Never true   Transportation Needs: No Transportation Needs (10/18/2024)    PRAPARE - Transportation     Lack of Transportation (Medical): No     Lack of Transportation (Non-Medical): No   Physical Activity: Inactive (4/9/2024)    Exercise Vital Sign     Days of Exercise per Week: 0 days     Minutes of Exercise per Session: 0 min   Stress: No Stress Concern Present (4/9/2024)    [employer] [place] [employer] - Occupational Stress Questionnaire     Feeling of Stress : Only a little   Social Connections: Moderately Isolated (4/9/2024)    Social Connection and Isolation Panel [NHANES]     Frequency of Communication with Friends and Family: More than three times a week     Frequency of Social Gatherings with Friends and Family: More than three times a week     Attends Zoroastrian Services: Never     Active Member of Clubs or Organizations: No     Attends Club or Organization Meetings: Never     Marital Status:    Intimate Partner Violence: Not At Risk (10/15/2024)    Humiliation, Afraid, Rape, and Kick questionnaire     Fear of Current or Ex-Partner: No     Emotionally Abused: No     Physically Abused: No     Sexually Abused: No   Housing Stability: Low Risk  (10/18/2024)    Housing Stability Vital Sign     Unable to Pay for Housing in the Last Year: No     Number of Times Moved in the Last Year: 1     Homeless in the Last Year: No         ROS:   10 point review systems is otherwise negative except as per the HPI    Chief Complaint   Patient  presents with    Atrial Fibrillation     Fv dx: PAF (paroxysmal atrial fibrillation) ([facility])      Hypertension          [1]   Past Medical History:  Diagnosis Date    Acquired hypothyroidism 05/04/2020    CAD (coronary artery disease)     Chronic diastolic heart failure ([facility]) 05/04/2020    Coronary artery disease due to lipid rich plaque     2 Synergy NOEMI to 100% RCA stent placed    Dental disorder     partial dentures- uppers    Diabetes ([facility])     oral medication    ESRD (end stage renal disease) on dialysis ([facility]) 05/04/2020    Hemodialysis patient ([facility])     M, W, F    Hyperlipidemia     Hypertension     Kidney transplant candidate     Kidney transplant recipient 10/31/2022    Presence of drug-eluting stent in right coronary artery     QT prolongation 01/22/2020    RLS (restless legs syndrome) 08/05/2016    Transaminitis 12/22/2018   [2] No Known Allergies  [3]   Outpatient Encounter Medications as of 5/14/2025   Medication Sig Dispense Refill    vitamin D2, Ergocalciferol, (DRISDOL) 1.25 MG (04863 UT) Cap capsule Take 1 Capsule by mouth every 7 days. 12 Capsule 0    Continuous Glucose Sensor (FREESTYLE BALDOMERO 3 PLUS SENSOR) Misc Apply every 15 days 6 Each 4    Continuous Glucose  (FREESTYLE BALDOMERO 3 READER) Device Use 1 each continuously. 1 Each 0    apixaban (ELIQUIS) 2.5mg Tab Take 1 Tablet by mouth 2 times a day. TOME 1 TABLETA POR VIA ORAL DOS VECES AL DESMOND 60 Tablet 5    metoprolol SR (TOPROL XL) 25 MG TABLET SR 24 HR Take 25 mg by mouth every day.      LANTUS SOLOSTAR 100 UNIT/ML Solution Pen-injector injection INJECT 10 UNITS UNDER THE SKIN 2 TIMES A DAY. 5 PENS PER MONTH 18 mL 3    atorvastatin (LIPITOR) 20 MG Tab TOME 1 TABLETA POR VIA ORAL TODOS LOS MCCORMICK EN LA NOCHE 100 Tablet 3    furosemide (LASIX) 80 MG Tab Take 1 Tablet by mouth every day. 100 Tablet 3    acetaminophen (TYLENOL) 500 MG Tab Take 500-1,000 mg by mouth every 6 hours as needed.      BD PEN NEEDLE PETE 2ND GEN USE AS DIRECTED WITH  INSULIN PENS SIX TIMES DAILY 200 Each 3    amLODIPine (NORVASC) 10 MG Tab TOME BERNARDO TABLETA TODOS LOS MCCORMICK 90 Tablet 1    Continuous Glucose Sensor (FREESTYLE BALDOMERO 3 SENSOR) Misc 1 Each every 14 days. 6 Each 3    levothyroxine (SYNTHROID) 88 MCG Tab Take 1 Tablet by mouth every morning on an empty stomach. 90 Tablet 3    FARXIGA 5 MG Tab Take 1 Tablet by mouth every day. Por diabetes 90 Tablet 3    glucose blood (ACCU-CHEK GUIDE) strip USE ONE COVERED STRIP TO TEST BLOOD SUGAR THREE TIMES DAILY. 100 Strip 3    Lancets Use one covered lancet to test blood sugar three times daily. 100 Each 3    [DISCONTINUED] sodium bicarbonate (SODIUM BICARBONATE) 650 MG Tab Take 1 Tablet by mouth 2 times a day. (Patient not taking: Reported on 5/14/2025) 180 Tablet 3    [DISCONTINUED] NOVOLOG FLEXPEN 100 UNIT/ML solution for injection Inject 15 Units under the skin 3 times a day before meals. (Patient not taking: Reported on 5/14/2025) 45 mL 3    gabapentin (NEURONTIN) 100 MG Cap Take 100 mg by mouth 3 times a day. (Patient not taking: Reported on 5/14/2025)      [DISCONTINUED] tacrolimus (PROGRAF) 1 MG Cap TAKE 1 CAPSULE BY MOUTH TWICE DAILY (Patient not taking: Reported on 5/14/2025) 60 Capsule 11    [DISCONTINUED] polyethylene glycol 3350 (MIRALAX) 17 GM/SCOOP Powder Romi bernardo vez al d a seg n sea necesario para el estre imiento (Patient not taking: Reported on 5/14/2025) 238 g 1    [DISCONTINUED] triamcinolone acetonide (KENALOG) 0.1 % Cream Aplique bernardo cantidad del tamano de un guisante sobre la piel bernardo vez al fernanda janna sea necesario para la picazon o el sarpullido. (Patient not taking: Reported on 5/14/2025) 45 g 2    mycophenolate (CELLCEPT) 250 MG Cap Take 1 Capsule by mouth 2 times a day. (Patient not taking: Reported on 5/14/2025) 20 Capsule 0    [DISCONTINUED] predniSONE (DELTASONE) 5 MG Tab Take 5 mg by mouth every day. (Patient not taking: Reported on 5/14/2025)       No facility-administered encounter medications on  file as of 5/14/2025.

## 2025-05-20 NOTE — ASSESSMENT & PLAN NOTE
Chronic, Stable. BP in office today is 110/60. She does not check her BP at home. She currently takes amlodipine 10 mg daily, lasix 80 mg daily, and metoprolol 25 mg daily. Follow up with PCP for continued monitoring and management.

## 2025-05-20 NOTE — ASSESSMENT & PLAN NOTE
Chronic, stable. Last echo in 2023 . Her relevant cardiac meds include: farxiga 5 mg daily, lasix 80 mg daily, metoprolol 25 mg daily. She is euvolemic today. She follows with cardiology.

## 2025-05-20 NOTE — ASSESSMENT & PLAN NOTE
Chronic, decompensated. Last A1c in April 2025 was 11.5. She does check her blood sugar at home- has a CGM. Currently takes farxiga & long-acting insulin. Last retinal screening was July. Reports she has an upcoming appointment in July of this year. Last microalbumin creat ratio in April 2025 was WNL. Her PCP does her feet exams. She is on a statin. We discussed diet and lifestyle habits. Follow up with PCP for continued monitoring and management.

## 2025-05-20 NOTE — ASSESSMENT & PLAN NOTE
Chronic, stable. She has a hx of stents placed. Her cardiac meds are as follows: eliquis 2.5 mg BID, atorvastatin 20 mg daily, amlodipine 10 mg daily, lasix 80 mg daily, and metoprolol 25 mg daily. She gets occasional chest pain. Follows with cardiology.

## 2025-05-20 NOTE — ASSESSMENT & PLAN NOTE
Chronic, stable. Secondary to CKD. Last PTH in April 2025 was 114. She does take vitamin D. Follows with nephrology.

## 2025-05-20 NOTE — ASSESSMENT & PLAN NOTE
Chronic, stable. Currently taking levothyroxine 88 mcg daily as prescribed. Denies complications. TSH stable.

## 2025-05-20 NOTE — ASSESSMENT & PLAN NOTE
Chronic, stable. Last GFR in April 2025 was 32. She does follow with nephrology, as she received kidney transplant. We discussed avoiding nephrotoxic medication such as NSAIDs as well as maintaining adequate hydration. Follow up with PCP for continued monitoring.

## 2025-05-20 NOTE — ASSESSMENT & PLAN NOTE
Chronic status. She received a kidney transplant in 2022. She states she continues to take all anti rejection medications, such as mycophenolate, tacrolimus, and prednisone. Follow up with Nephrology.

## 2025-05-20 NOTE — ASSESSMENT & PLAN NOTE
Chronic, stable. Continue taking eliquis 2.5 mg BID and metoprolol 25 mg daily. Follows with cardiology regularly.

## 2025-05-20 NOTE — ASSESSMENT & PLAN NOTE
Chronic, stable. She currently takes atorvastatin 20 mg daily. We discussed her dietary/lifestyle regimen. Follow up with PCP for continued monitoring and management.  Lab Results   Component Value Date/Time    CHOLSTRLTOT 134 04/05/2025 08:21 AM    TRIGLYCERIDE 259 (H) 04/05/2025 08:21 AM    HDL 35 (A) 04/05/2025 08:21 AM    LDL 47 04/05/2025 08:21 AM

## 2025-05-21 ENCOUNTER — OFFICE VISIT (OUTPATIENT)
Dept: FAMILY PLANNING/WOMEN'S HEALTH CLINIC | Facility: PHYSICIAN GROUP | Age: 75
End: 2025-05-21
Payer: MEDICARE

## 2025-05-21 VITALS
BODY MASS INDEX: 23.16 KG/M2 | HEART RATE: 125 BPM | DIASTOLIC BLOOD PRESSURE: 60 MMHG | HEIGHT: 60 IN | WEIGHT: 118 LBS | SYSTOLIC BLOOD PRESSURE: 110 MMHG

## 2025-05-21 DIAGNOSIS — E78.2 MIXED HYPERLIPIDEMIA: ICD-10-CM

## 2025-05-21 DIAGNOSIS — I48.0 PAROXYSMAL ATRIAL FIBRILLATION (HCC): ICD-10-CM

## 2025-05-21 DIAGNOSIS — N18.4 TYPE 2 DIABETES MELLITUS WITH STAGE 4 CHRONIC KIDNEY DISEASE, WITH LONG-TERM CURRENT USE OF INSULIN (HCC): ICD-10-CM

## 2025-05-21 DIAGNOSIS — D84.821 IMMUNOSUPPRESSION DUE TO DRUG THERAPY (HCC): ICD-10-CM

## 2025-05-21 DIAGNOSIS — Z91.81 RISK FOR FALLS: Primary | ICD-10-CM

## 2025-05-21 DIAGNOSIS — Z94.0 DECEASED-DONOR KIDNEY TRANSPLANT RECIPIENT: Chronic | ICD-10-CM

## 2025-05-21 DIAGNOSIS — E11.22 TYPE 2 DIABETES MELLITUS WITH STAGE 4 CHRONIC KIDNEY DISEASE, WITH LONG-TERM CURRENT USE OF INSULIN (HCC): ICD-10-CM

## 2025-05-21 DIAGNOSIS — E03.9 HYPOTHYROIDISM, ACQUIRED: ICD-10-CM

## 2025-05-21 DIAGNOSIS — Z79.899 IMMUNOSUPPRESSION DUE TO DRUG THERAPY (HCC): ICD-10-CM

## 2025-05-21 DIAGNOSIS — I10 PRIMARY HYPERTENSION: Chronic | ICD-10-CM

## 2025-05-21 DIAGNOSIS — I50.32 CHRONIC HEART FAILURE WITH PRESERVED EJECTION FRACTION (HCC): ICD-10-CM

## 2025-05-21 DIAGNOSIS — N25.81 SECONDARY HYPERPARATHYROIDISM (HCC): ICD-10-CM

## 2025-05-21 DIAGNOSIS — N18.4 CKD (CHRONIC KIDNEY DISEASE) STAGE 4, GFR 15-29 ML/MIN (HCC): ICD-10-CM

## 2025-05-21 DIAGNOSIS — I25.111 CORONARY ARTERY DISEASE INVOLVING NATIVE CORONARY ARTERY OF NATIVE HEART WITH ANGINA PECTORIS WITH DOCUMENTED SPASM (HCC): ICD-10-CM

## 2025-05-21 DIAGNOSIS — Z79.4 TYPE 2 DIABETES MELLITUS WITH STAGE 4 CHRONIC KIDNEY DISEASE, WITH LONG-TERM CURRENT USE OF INSULIN (HCC): ICD-10-CM

## 2025-05-21 PROCEDURE — 3074F SYST BP LT 130 MM HG: CPT

## 2025-05-21 PROCEDURE — 1126F AMNT PAIN NOTED NONE PRSNT: CPT

## 2025-05-21 PROCEDURE — 3078F DIAST BP <80 MM HG: CPT

## 2025-05-21 PROCEDURE — G0439 PPPS, SUBSEQ VISIT: HCPCS

## 2025-05-21 SDOH — ECONOMIC STABILITY: HOUSING INSECURITY: IN THE LAST 12 MONTHS, WAS THERE A TIME WHEN YOU WERE NOT ABLE TO PAY THE MORTGAGE OR RENT ON TIME?: NO

## 2025-05-21 SDOH — ECONOMIC STABILITY: FOOD INSECURITY: WITHIN THE PAST 12 MONTHS, THE FOOD YOU BOUGHT JUST DIDN'T LAST AND YOU DIDN'T HAVE MONEY TO GET MORE.: NEVER TRUE

## 2025-05-21 SDOH — ECONOMIC STABILITY: FOOD INSECURITY: WITHIN THE PAST 12 MONTHS, YOU WORRIED THAT YOUR FOOD WOULD RUN OUT BEFORE YOU GOT THE MONEY TO BUY MORE.: NEVER TRUE

## 2025-05-21 SDOH — ECONOMIC STABILITY: FOOD INSECURITY: HOW HARD IS IT FOR YOU TO PAY FOR THE VERY BASICS LIKE FOOD, HOUSING, MEDICAL CARE, AND HEATING?: NOT HARD AT ALL

## 2025-05-21 SDOH — ECONOMIC STABILITY: HOUSING INSECURITY: IN THE PAST 12 MONTHS, HOW MANY TIMES HAVE YOU MOVED WHERE YOU WERE LIVING?: 1

## 2025-05-21 SDOH — ECONOMIC STABILITY: HOUSING INSECURITY: AT ANY TIME IN THE PAST 12 MONTHS, WERE YOU HOMELESS OR LIVING IN A SHELTER (INCLUDING NOW)?: NO

## 2025-05-21 SDOH — ECONOMIC STABILITY: TRANSPORTATION INSECURITY: IN THE PAST 12 MONTHS, HAS LACK OF TRANSPORTATION KEPT YOU FROM MEDICAL APPOINTMENTS OR FROM GETTING MEDICATIONS?: NO

## 2025-05-21 ASSESSMENT — ACTIVITIES OF DAILY LIVING (ADL)
BATHING_REQUIRES_ASSISTANCE: 0
LACK_OF_TRANSPORTATION: NO

## 2025-05-21 ASSESSMENT — FIBROSIS 4 INDEX: FIB4 SCORE: 2.44

## 2025-05-21 ASSESSMENT — ENCOUNTER SYMPTOMS: GENERAL WELL-BEING: GOOD

## 2025-05-21 ASSESSMENT — PATIENT HEALTH QUESTIONNAIRE - PHQ9: CLINICAL INTERPRETATION OF PHQ2 SCORE: 0

## 2025-05-21 ASSESSMENT — PAIN SCALES - GENERAL: PAINLEVEL_OUTOF10: NO PAIN

## 2025-05-21 NOTE — ASSESSMENT & PLAN NOTE
Patient fall-risk assessment in office is positive. She reports 2 or more falls in the last year. Educated on removing hazards in the home, keeping walkways clear, as well as wearing appropriate non-slip footwear around the house.

## 2025-05-21 NOTE — PROGRESS NOTES
Comprehensive Health Assessment Program     Tere Gallegos is a 74 y.o. here for her comprehensive health assessment.   services were used in the patient's primary language of Greek.     Name or Number: Vicki [id number]  Mode of interpretation: iPad    Content of Interpretation:    Of note, patient is a very poor historian, likely given the language barrier.     Patient Active Problem List    Diagnosis Date Noted    Risk for falls 2025    Stage 3a chronic kidney disease 2025    Lumbar radiculopathy 2025    CKD (chronic kidney disease) stage 4, GFR 15-29 ml/min ([facility]) 2024    Hyperglycemia 10/15/2024    Bradycardia 10/15/2024    Hyperkalemia 2024    Vitamin D deficiency 2024    Secondary hyperparathyroidism ([facility]) 2024    Polyneuropathy 2024    Immunosuppression due to drug therapy ([facility]) 12/15/2023    Infection due to ESBL-producing Escherichia coli 2023    Metabolic acidosis 2023    BK viremia 2023    Other hydronephrosis 2023    Immunosuppressive management encounter following kidney transplant 2023    Leg swelling 2023    -donor kidney transplant recipient 2022    Lung nodules 10/22/2022    GERD (gastroesophageal reflux disease) 10/19/2022    Normocytic anemia 2022    Secondary hypercoagulable state (HCC) 2022    Paroxysmal atrial fibrillation (HCC) 2021    Chronic heart failure with preserved ejection fraction (HCC) 2020    Hypothyroidism, acquired 2020    Dental disorder 2020    Coronary artery disease with angina pectoris with documented spasm ([facility])     Mixed hyperlipidemia 2019    Elevated troponin 2018    RLS (restless legs syndrome) 2016    Type 2 diabetes mellitus with stage 4 chronic kidney disease, with long-term current use of insulin (HCC) 2016    Primary hypertension 2013       Current Medications[1]        Current supplements as per medication list.     Allergies:   Patient has no known allergies.  Social History[2]  Family History   Problem Relation Age of Onset    Diabetes Sister     Other Sister         liver disease    Diabetes Brother     Heart Disease Neg Irene Carranza  has a past medical history of Acquired hypothyroidism (05/04/2020), CAD (coronary artery disease), Chronic diastolic heart failure (HCC) (05/04/2020), Coronary artery disease due to lipid rich plaque, Dental disorder, Diabetes ([facility]), ESRD (end stage renal disease) on dialysis ([facility]) (05/04/2020), Hemodialysis patient ([facility]), Hyperlipidemia, Hypertension, Kidney transplant candidate, Kidney transplant recipient (10/31/2022), Presence of drug-eluting stent in right coronary artery, QT prolongation (01/22/2020), RLS (restless legs syndrome) (08/05/2016), and Transaminitis (12/22/2018).   Past Surgical History[3]    Screening:  In the last six months have you experienced any leakage of urine? No    Depression Screening  Little interest or pleasure in doing things?  0 - not at all  Feeling down, depressed , or hopeless? 0 - not at all  Trouble falling or staying asleep, or sleeping too much?     Feeling tired or having little energy?     Poor appetite or overeating?     Feeling bad about yourself - or that you are a failure or have let yourself or your family down?    Trouble concentrating on things, such as reading the newspaper or watching television?    Moving or speaking so slowly that other people could have noticed.  Or the opposite - being so fidgety or restless that you have been moving around a lot more than usual?     Thoughts that you would be better off dead, or of hurting yourself?     Patient Health Questionnaire Score:      If depressive symptoms identified deferred to follow up visit unless specifically addressed in assessment and plan.    Interpretation of PHQ-9 Total Score   Score Severity   1-4 No Depression   5-9 Mild Depression    10-14 Moderate Depression   15-19 Moderately Severe Depression   20-27 Severe Depression    Screening for Cognitive Impairment  Do you or any of your friends or family members have any concern about your memory? Yes states she forgets short term things, but can remember to take her medications, self hygiene, etc.   Three Minute Recall (Village, Kitchen, Baby) 0/3 Language barrier   Mauro clock face with all 12 numbers and set the hands to show 10 minutes past 11.  Yes Language barrier   Cognitive concerns identified deferred for follow up unless specifically addressed in assessment and plan.    Fall Risk Assessment  Has the patient had two or more falls in the last year or any fall with injury in the last year?  Yes, she has poor balance. Does not do anything to work on balance nor does she do any exercises. Does not use a cane or walker.     Safety Assessment  Do you always wear your seatbelt?  Yes  Any changes to home needed to function safely? No  Difficulty hearing.  Yes does not wear hearing aids. Can hear one on one.   Patient counseled about all safety risks that were identified.    Functional Assessment ADLs  Are there any barriers preventing you from cooking for yourself or meeting nutritional needs?  No.    Are there any barriers preventing you from driving safely or obtaining transportation?  Yes.    Are there any barriers preventing you from using a telephone or calling for help?  No    Are there any barriers preventing you from shopping?  No.    Are there any barriers preventing you from taking care of your own finances?  No    Are there any barriers preventing you from managing your medications?  No    Are there any barriers preventing you from showering, bathing or dressing yourself? No    Are there any barriers preventing you from doing housework or laundry? No    Are there any barriers preventing you from using the toilet?No    Are you currently engaging in any exercise or physical activity?  No.       Self-Assessment of Health  What is your perception of your health? Good    Do you sleep more than six hours a night? No 3-4 hours of sleep she is unable to determine why.   In the past 7 days, how much did pain keep you from doing your normal work? None    Do you spend quality time with family or friends (virtually or in person)? Yes    Do you usually eat a heart healthy diet that constists of a variety of fruits, vegetables, whole grains and fiber? Yes    Do you eat foods high in fat and/or Fast Food more than three times per week? No    How concerned are you that your medical conditions are not being well managed? Not at all    Are you worried that in the next 2 months, you may not have stable housing that you own, rent, or stay in as part of a household? No        Advance Care Planning  Do you have an Advance Directive, Living Will, Durable Power of , or POLST? No                 Health Maintenance Summary            Current Care Gaps       Diabetes: Monofilament / LE Exam (Yearly) Overdue since 6/7/2024 06/07/2023  SmartData: WORKFLOW - DIABETES - DIABETIC FOOT EXAM PERFORMED    08/03/2021  SmartData: WORKFLOW - DIABETES - DIABETIC FOOT EXAM PERFORMED    05/03/2021  SmartData: WORKFLOW - DIABETES - DIABETIC FOOT EXAM PERFORMED    01/22/2021  Diabetic Monofilament LE Exam    01/22/2021  SmartData: WORKFLOW - DIABETES - DIABETIC FOOT EXAM PERFORMED     Only the first 5 history entries have been loaded, but more history exists.            COVID-19 Vaccine (5 - 2024-25 season) Overdue since 9/1/2024      10/13/2023  Imm Admin: COVID-19, mRNA, LNP-S, PF, shelton-sucrose, 30 mcg/0.3 mL    01/04/2022  Imm Admin: PFIZER PURPLE CAP SARS-COV-2 VACCINATION (12+)    05/03/2021  Imm Admin: MODERNA SARS-COV-2 VACCINE (12+)    04/05/2021  Imm Admin: MODERNA SARS-COV-2 VACCINE (12+)              IMM DTaP/Tdap/Td Vaccine (1 - Tdap) Never done      No completion history exists for this topic.              Zoster  (Shingles) Vaccines (1 of 2) Never done     No completion history exists for this topic.                      Needs Review       Hepatitis C Screening  Tentatively Complete      10/19/2022  Hepatitis C Antibody component of HEPATITIS PANEL ACUTE(4 COMPONENTS)    08/04/2022  HEP C VIRUS ANTIBODY    01/25/2022  Hepatitis C Antibody component of HEPATITIS PANEL ACUTE(4 COMPONENTS)    08/11/2021  Hepatitis C Antibody component of HEPATITIS PANEL ACUTE(4 COMPONENTS)    05/26/2021  HEP C VIRUS ANTIBODY     Only the first 5 history entries have been loaded, but more history exists.            Colorectal Cancer Screening (Colonoscopy - Every 10 Years) Tentatively due on 6/23/2026 06/23/2016  REFERRAL TO GI FOR COLONOSCOPY                      Upcoming       A1c Screening (Every 3 Months) Next due on 7/16/2025 04/16/2025  POCT Hemoglobin A1C    01/08/2025  HEMOGLOBIN A1C    01/08/2025  HEMOGLOBIN A1C    06/12/2024  POCT Hemoglobin A1C    02/29/2024  HEMOGLOBIN A1C      Only the first 5 history entries have been loaded, but more history exists.              Diabetes: Retinopathy Screening (Yearly) Next due on 7/31/2025 07/31/2024  RETINAL SCREENING RESULTS    07/26/2023  AMB EXTERNAL RETINAL SCREENING RESULTS    05/17/2022  Referral for Retinal Screening Exam    06/22/2021  REFERRAL FOR RETINAL SCREENING EXAM    05/26/2020  REFERRAL FOR RETINAL SCREENING EXAM      Only the first 5 history entries have been loaded, but more history exists.              Fasting Lipid Profile (Yearly) Next due on 4/5/2026 04/05/2025  Lipid Profile    04/02/2025  Lipid Profile    08/12/2024  Lipid Profile    07/19/2024  Lipid Profile    02/18/2023  Lipid Profile (Tomorrow AM)      Only the first 5 history entries have been loaded, but more history exists.              SERUM CREATININE (Yearly) Next due on 4/5/2026 04/22/2025  Order placed for Basic Metabolic Panel by Priyank Villar M.D.    04/05/2025  Comp Metabolic Panel     04/02/2025  Basic Metabolic Panel    04/02/2025  Comp Metabolic Panel    02/22/2025  Basic Metabolic Panel      Only the first 5 history entries have been loaded, but more history exists.              Annual Wellness Visit (Yearly) Next due on 5/21/2026 05/21/2025  Level of Service: MI ANNUAL WELLNESS VISIT-INCLUDES PPPS SUBSEQUE*    07/18/2022  Visit Dx: Medicare annual wellness visit, initial    05/11/2020  Level of Service: MI PREVENTIVE VISIT,EST,65 & OVER    09/23/2016  Level of Service: PB PBEVENTIVE VISIT,EST,65 & OVER              Bone Density Scan (Every 5 Years) Next due on 7/26/2028 07/26/2023  DS-BONE DENSITY STUDY (DEXA)                      Completed or No Longer Recommended       Influenza Vaccine (Series Information) Completed      12/18/2024  Imm Admin: Influenza high-dose trivalent (PF)    10/13/2023  Imm Admin: Influenza Vaccine Adult HD    01/19/2023  Imm Admin: Influenza Vaccine Adult HD    10/22/2022  Imm Admin: Influenza Vaccine Adult HD    11/08/2018  Imm Admin: Influenza Vaccine Adult HD      Only the first 5 history entries have been loaded, but more history exists.              Pneumococcal Vaccine: 50+ Years (Series Information) Completed      12/25/2018  Imm Admin: Pneumococcal polysaccharide vaccine (PPSV-23)    10/16/2017  Imm Admin: Pneumococcal Conjugate Vaccine (Prevnar/PCV-13)              Hepatitis A Vaccine (Hep A) (Series Information) Aged Out      No completion history exists for this topic.              Hepatitis B Vaccine (Hep B) (Series Information) Aged Out     No completion history exists for this topic.              HPV Vaccines (Series Information) Aged Out     No completion history exists for this topic.              Polio Vaccine (Inactivated Polio) (Series Information) Aged Out     No completion history exists for this topic.              Meningococcal Immunization (Series Information) Aged Out     No completion history exists for this topic.               Meningococcal B Vaccine (Series Information) Aged Out     No completion history exists for this topic.              Diabetes: Urine Protein Screening  Discontinued      04/22/2025  Order placed for PROTEIN/CREAT RATIO URINE by Priyank Villar M.D.    04/05/2025  MICROALBUMIN CREAT RATIO URINE    04/02/2025  MICROALBUMIN CREAT RATIO URINE    04/02/2025  PROTEIN/CREAT RATIO URINE    02/22/2025  MICROALBUMIN CREAT RATIO URINE     Only the first 5 history entries have been loaded, but more history exists.            Mammogram  Discontinued        Frequency changed to Never automatically (Topic No Longer Applies)    06/21/2024  MA-SCREENING MAMMO BILAT W/TOMOSYNTHESIS W/CAD    06/16/2022  MA-SCREENING MAMMO BILAT W/TOMOSYNTHESIS W/CAD    08/24/2019  [id number]    07/20/2016  MA-SCREEN MAMMO W/CAD-BILAT      Only the first 5 history entries have been loaded, but more history exists.              Cervical Cancer Screening  Discontinued        Frequency changed to Never automatically (Topic No Longer Applies)    05/11/2020  Pathology Gynecology Specimen    05/11/2020  THINPREP PAP WITH HPV    09/23/2016  THINPREP PAP ONLY    09/23/2016  PATHOLOGY GYN SPECIMEN      Only the first 5 history entries have been loaded, but more history exists.                            Patient Care Team:  ANGELITA Concepcion as PCP - General (Family Medicine)  Fadi Najjar, M.D. as Consulting Physician (Nephrology)  Chance Larose M.D. (Ophthalmology)  Maren Rogers R.N. as  (Registered Nurse)  Maren Rogers R.N. as  (Registered Nurse)    Financial Resource Strain: Low Risk  (5/21/2025)    Overall Financial Resource Strain (CARDIA)     Difficulty of Paying Living Expenses: Not hard at all      Transportation Needs: No Transportation Needs (5/21/2025)    PRAPARE - Transportation     Lack of Transportation (Medical): No     Lack of Transportation (Non-Medical): No      Food Insecurity: No Food  Insecurity (5/21/2025)    Hunger Vital Sign     Worried About Running Out of Food in the Last Year: Never true     Ran Out of Food in the Last Year: Never true        Encounter Vitals  Blood Pressure : 110/60  Pulse: (!) 125  Peak Flow: 97 L/min  Weight: 53.5 kg (118 lb)  Height: 152.4 cm (5')  BMI (Calculated): 23.05  Pain Score: No pain     Physical Exam  Constitutional:       General: She is not in acute distress.     Appearance: Normal appearance.   HENT:      Head: Normocephalic and atraumatic.   Eyes:      Extraocular Movements: Extraocular movements intact.      Pupils: Pupils are equal, round, and reactive to light.   Cardiovascular:      Rate and Rhythm: Regular rhythm. Tachycardia present.      Heart sounds: Normal heart sounds.   Pulmonary:      Breath sounds: Normal breath sounds.   Musculoskeletal:      Right lower leg: No edema.      Left lower leg: No edema.   Neurological:      Mental Status: She is alert and oriented to person, place, and time.   Psychiatric:         Mood and Affect: Mood normal.         Behavior: Behavior normal.       Assessment and Plan. The following treatment and monitoring plan is recommended:  Chronic heart failure with preserved ejection fraction (HCC)  Chronic, stable. Last echo in 2023 . Her relevant cardiac meds include: farxiga 5 mg daily, lasix 80 mg daily, metoprolol 25 mg daily. She is euvolemic today. She follows with cardiology.     CKD (chronic kidney disease) stage 4, GFR 15-29 ml/min (HCC)  Chronic, stable. Last GFR in April 2025 was 32. She does follow with nephrology, as she received kidney transplant. We discussed avoiding nephrotoxic medication such as NSAIDs as well as maintaining adequate hydration. Follow up with PCP for continued monitoring.    Coronary artery disease with angina pectoris with documented spasm (HCC)  Chronic, stable. She has a hx of stents placed. Her cardiac meds are as follows: eliquis 2.5 mg BID, atorvastatin 20 mg daily, amlodipine 10  mg daily, lasix 80 mg daily, and metoprolol 25 mg daily. She gets occasional chest pain. Follows with cardiology.     Immunosuppression due to drug therapy ([facility])  -donor kidney transplant recipient  Chronic status. She received a kidney transplant in . She states she continues to take all anti rejection medications, such as mycophenolate, tacrolimus, and prednisone. Follow up with Nephrology.     Hypothyroidism, acquired  Chronic, stable. Currently taking levothyroxine 88 mcg daily as prescribed. Denies complications. TSH stable.    Mixed hyperlipidemia  Chronic, stable. She currently takes atorvastatin 20 mg daily. We discussed her dietary/lifestyle regimen. Follow up with PCP for continued monitoring and management.  Lab Results   Component Value Date/Time    CHOLSTRLTOT 134 2025 08:21 AM    TRIGLYCERIDE 259 (H) 2025 08:21 AM    HDL 35 (A) 2025 08:21 AM    LDL 47 2025 08:21 AM     Paroxysmal atrial fibrillation (HCC)  Chronic, stable. Continue taking eliquis 2.5 mg BID and metoprolol 25 mg daily. Follows with cardiology regularly.     Primary hypertension  Chronic, Stable. BP in office today is 110/60. She does not check her BP at home. She currently takes amlodipine 10 mg daily, lasix 80 mg daily, and metoprolol 25 mg daily. Follow up with PCP for continued monitoring and management.    Secondary hyperparathyroidism (HCC)  Chronic, stable. Secondary to CKD. Last PTH in 2025 was 114. She does take vitamin D. Follows with nephrology.     Type 2 diabetes mellitus with stage 4 chronic kidney disease, with long-term current use of insulin ([facility])  Chronic, decompensated. Last A1c in 2025 was 11.5. She does check her blood sugar at home- has a CGM. Currently takes farxiga & long-acting insulin. Last retinal screening was July. Reports she has an upcoming appointment in July of this year. Last microalbumin creat ratio in 2025 was WNL. Her PCP does her feet exams. She  is on a statin. We discussed diet and lifestyle habits. Follow up with PCP for continued monitoring and management.    Risk for falls  Patient fall-risk assessment in office is positive. She reports 2 or more falls in the last year. Educated on removing hazards in the home, keeping walkways clear, as well as wearing appropriate non-slip footwear around the house.       Services suggested: No services needed at this time  Health Care Screening: Age-appropriate preventive services recommended by USPTF and ACIP covered by Medicare were discussed today. Services ordered if indicated and agreed upon by the patient.  Referrals offered: Community-based lifestyle interventions to reduce health risks and promote self-management and wellness, fall prevention, nutrition, physical activity, tobacco-use cessation, weight loss, and mental health services as per orders if indicated.    Discussion today about general wellness and lifestyle habits:    Prevent falls and reduce trip hazards; Cautioned about securing or removing rugs.  Have a working fire alarm and carbon monoxide detector.  Engage in regular physical activity and social activities.    Follow-up: Return for appointment with Primary Care Provider as needed..              [1]   Current Outpatient Medications   Medication Sig Dispense Refill    Continuous Glucose Sensor (FREESTYLE BALDOMERO 3 PLUS SENSOR) Misc Apply every 15 days 6 Each 4    Continuous Glucose  (FREESTYLE BALDOMERO 3 READER) Device Use 1 each continuously. 1 Each 0    apixaban (ELIQUIS) 2.5mg Tab Take 1 Tablet by mouth 2 times a day. TOME 1 TABLETA POR VIA ORAL DOS VECES AL DESMOND 60 Tablet 5    metoprolol SR (TOPROL XL) 25 MG TABLET SR 24 HR Take 25 mg by mouth every day.      LANTUS SOLOSTAR 100 UNIT/ML Solution Pen-injector injection INJECT 10 UNITS UNDER THE SKIN 2 TIMES A DAY. 5 PENS PER MONTH 18 mL 3    atorvastatin (LIPITOR) 20 MG Tab TOME 1 TABLETA POR VIA ORAL TODOS LOS MCCORMICK EN LA NOCHE 100 Tablet 3     furosemide (LASIX) 80 MG Tab Take 1 Tablet by mouth every day. 100 Tablet 3    BD PEN NEEDLE PETE 2ND GEN USE AS DIRECTED WITH INSULIN PENS SIX TIMES DAILY 200 Each 3    amLODIPine (NORVASC) 10 MG Tab TOME FERNANDO TABLETA TODOS LOS MCCORMICK 90 Tablet 1    Continuous Glucose Sensor (FREESTYLE BALDOMERO 3 SENSOR) Misc 1 Each every 14 days. 6 Each 3    levothyroxine (SYNTHROID) 88 MCG Tab Take 1 Tablet by mouth every morning on an empty stomach. 90 Tablet 3    FARXIGA 5 MG Tab Take 1 Tablet by mouth every day. Por diabetes 90 Tablet 3    glucose blood (ACCU-CHEK GUIDE) strip USE ONE COVERED STRIP TO TEST BLOOD SUGAR THREE TIMES DAILY. 100 Strip 3    Lancets Use one covered lancet to test blood sugar three times daily. 100 Each 3    vitamin D2, Ergocalciferol, (DRISDOL) 1.25 MG (44788 UT) Cap capsule Take 1 Capsule by mouth every 7 days. 12 Capsule 0    acetaminophen (TYLENOL) 500 MG Tab Take 500-1,000 mg by mouth every 6 hours as needed.       No current facility-administered medications for this visit.   [2]   Social History  Tobacco Use    Smoking status: Never    Smokeless tobacco: Never   Vaping Use    Vaping status: Never Used   Substance Use Topics    Alcohol use: No     Alcohol/week: 0.0 oz    Drug use: No   [3]   Past Surgical History:  Procedure Laterality Date    URETERAL REIMPLANTATION  2023    [place] [facility] [place]BDOMINAL SURGERY Right 10/31/2022     Donor kidney transplant - [facility] CARDIAC CATH  2016    RCA stented with 2 Synergy drug-eluting stents.    RECOVERY  2016    Procedure: CATH LAB [facility] WITH POSSIBLE DR. CASTILLO;  Surgeon: [facility];  Location: SURGERY PRE-POST PROC UNIT [facility] [place];  Service:     ZZZ CARDIAC CATH  2016    100% RCA    OTHER Left 2014    left arm upper extremity fistula    OTHER ABDOMINAL SURGERY      left kidney removed due to cancer

## 2025-05-28 ENCOUNTER — APPOINTMENT (OUTPATIENT)
Dept: MEDICAL GROUP | Facility: IMAGING CENTER | Age: 75
End: 2025-05-28
Payer: MEDICARE

## 2025-06-04 ENCOUNTER — OFFICE VISIT (OUTPATIENT)
Dept: ENDOCRINOLOGY | Facility: MEDICAL CENTER | Age: 75
End: 2025-06-04
Attending: INTERNAL MEDICINE
Payer: MEDICARE

## 2025-06-04 VITALS — WEIGHT: 121.5 LBS | BODY MASS INDEX: 23.73 KG/M2

## 2025-06-04 DIAGNOSIS — Z79.4 TYPE 2 DIABETES MELLITUS WITH OTHER SPECIFIED COMPLICATION, WITH LONG-TERM CURRENT USE OF INSULIN (HCC): Primary | ICD-10-CM

## 2025-06-04 DIAGNOSIS — E11.69 TYPE 2 DIABETES MELLITUS WITH OTHER SPECIFIED COMPLICATION, WITH LONG-TERM CURRENT USE OF INSULIN (HCC): Primary | ICD-10-CM

## 2025-06-04 PROCEDURE — 99213 OFFICE O/P EST LOW 20 MIN: CPT

## 2025-06-04 ASSESSMENT — FIBROSIS 4 INDEX: FIB4 SCORE: 2.44

## 2025-06-04 NOTE — PROGRESS NOTES
Patient Consult Note    Primary care physician: ANGELITA Concepcion    Reason for Consult: Management of Uncontrolled Type 2 Diabetes    Date Referral Placed: 04/16/2025    Utilized translation services for this encounter.  Language Line - [phone]  [facility] ID - [id number]   [place], ID # [id number]    HPI:  Tere Gallegos is a 74 y.o. old patient who comes in today for evaluation of above stated problem.    Allergies  Patient has no known allergies.    Current Diabetes Medication Regimen  SGLT-2 Inhibitor: dapagliflozin (Farxiga) 5 mg daily  Basal Insulin: Lantus 25 units under the skin BID  Prandial Insulin: Novolog 15 units under the skin AC TID    Previous Diabetes Medications and Reason for Discontinuation  Pioglitazone 30 mg  Glipizide 5 mg  Glimepiride 1 mg  Trulicity 1.5 mg  Insulin Regular 5-10 units daily    Potential Barriers to Care:  Adherence: denies missed doses  Side effects: none  Affordability: insurance covers; no issues    SMBG  Pt has home glucometer and proper testing technique - yes, every day in the mornings, forgot to bring the machine, has candelaria sensor but has not been using due to confusion on how to apply and use.    Pt reports blood sugars:   Before Breakfast: 206 - fasting 6/4; 200 and up in the am  Other times: 400-500 during the day    Hyperglycemia/Hypoglycemia  Hyperglycemia: polyuria, polydipsia  Hypoglycemia awareness: No   Nocturnal hypoglycemia: None  Hypoglycemia:  None  Pt's treatment of Hypoglycemia  Discussed 15:15 Rule    Lifestyle  Current Exercise - none currently, goes on walks      Dietary - 2-3 depending on hunger - small meals  Breakfast - eggs, oatmeal   Lunch - chicken, meat  Dinner - homemade bread   Snacks - denies snacking  Drinks - a little coffee (without caffeine), water (lots of water)    Labs  Lab Results   Component Value Date/Time    HBA1C 11.4 (A) 04/16/2025 11:19 AM    HBA1C 8.3 (H) 01/08/2025 11:36 AM    HBA1C 8.3 (A)  01/08/2025 11:36 AM    HBA1C 9.5 (A) 06/12/2024 09:30 AM      Lab Results   Component Value Date/Time    SODIUM 135 04/05/2025 08:21 AM    POTASSIUM 4.7 04/05/2025 08:21 AM    CHLORIDE 101 04/05/2025 08:21 AM    CO2 17 (L) 04/05/2025 08:21 AM    GLUCOSE 237 (H) 04/05/2025 08:21 AM    BUN 51 (H) 04/05/2025 08:21 AM    CREATININE 1.65 (H) 04/05/2025 08:21 AM    BUNCREATRAT 8 (L) 01/28/2021 07:00 AM     Lab Results   Component Value Date/Time    ALKPHOSPHAT 95 04/05/2025 08:21 AM    ASTSGOT 18 04/05/2025 08:21 AM    ALTSGPT 22 04/05/2025 08:21 AM    TBILIRUBIN 0.4 04/05/2025 08:21 AM    INR 0.98 07/28/2023 11:10 AM    ALBUMIN 4.1 04/05/2025 08:21 AM      Lab Results   Component Value Date/Time    CHOLSTRLTOT 134 04/05/2025 08:21 AM    LDL 47 04/05/2025 08:21 AM    HDL 35 (A) 04/05/2025 08:21 AM    TRIGLYCERIDE 259 (H) 04/05/2025 08:21 AM       Lab Results   Component Value Date/Time    MALBCRT see below 04/05/2025 08:21 AM    MICROALBUR <1.2 04/05/2025 08:21 AM       Physical Examination:  Vital signs: Wt 55.1 kg (121 lb 8 oz)   LMP  (LMP Unknown)   BMI 23.73 kg/m²  Body mass index is 23.73 kg/m².    Assessment and Plan:    1. DM2  Basic physiology of DMII was explained to patient as well as microvascular/macrovascular complications. The importance of increasing physical activity to improve diabetes control was discussed with the patient. Patient was also educated on changing diet and making better choices to help control blood sugar.   Discussed Goals: FBG <150, 2hPP < 200, a1c < 8.0% - will reassess at next visit depending on patient's A1C  Last a1c drawn on 04/16/2025 was 11.4%, which is not at goal and has worsened since the previous reading  Reported SMBG was above goal and patient is experiencing hyperglycemia symptoms  Patient was shown how to place a sensor and how to pair the sensor with her continuous glucose monitor  Will increase both basal and prandial insulin due to hyperglycemia and to help lower her  A1c. With her morning blood glucose readings being above 200 this is putting her at more risk for other co-morbid conditions.     - Medication changes:  Increase Lantus to 30 units BID  Increase Novolog to 20 units AC TID     - Continue:  Farxiga 5 mg daily    - Lifestyle changes:  Exercise Goal - not discussed due to time limits will discuss at next visit  Dietary Goal - not discussed due to time limits will discuss at next visit    - Preventative management:  REC DM Score: N/A  Care gaps addressed:   A1c is above 8%: Optimized DM medications/management  Last Eye exam was on July 31, 2024; next appointment is in July 2025  Care gaps updated in Health Maintenance    Follow Up:  6 weeks for repeat A1c    Crissy Reno, Pharmacy Intern    CC:   ANGELITA Concepcion

## 2025-06-11 ENCOUNTER — APPOINTMENT (OUTPATIENT)
Dept: CARDIOLOGY | Facility: MEDICAL CENTER | Age: 75
DRG: 309 | End: 2025-06-11
Attending: NURSE PRACTITIONER
Payer: MEDICARE

## 2025-06-11 ENCOUNTER — APPOINTMENT (OUTPATIENT)
Dept: RADIOLOGY | Facility: MEDICAL CENTER | Age: 75
DRG: 309 | End: 2025-06-11
Attending: EMERGENCY MEDICINE
Payer: MEDICARE

## 2025-06-11 ENCOUNTER — HOSPITAL ENCOUNTER (INPATIENT)
Facility: MEDICAL CENTER | Age: 75
LOS: 3 days | DRG: 309 | End: 2025-06-15
Attending: EMERGENCY MEDICINE | Admitting: HOSPITALIST
Payer: MEDICARE

## 2025-06-11 DIAGNOSIS — I48.91 ATRIAL FIBRILLATION WITH RVR (HCC): Primary | ICD-10-CM

## 2025-06-11 DIAGNOSIS — Z79.4 TYPE 2 DIABETES MELLITUS WITH OTHER SPECIFIED COMPLICATION, WITH LONG-TERM CURRENT USE OF INSULIN (HCC): ICD-10-CM

## 2025-06-11 DIAGNOSIS — I50.32 CHRONIC HEART FAILURE WITH PRESERVED EJECTION FRACTION (HCC): ICD-10-CM

## 2025-06-11 DIAGNOSIS — E11.69 TYPE 2 DIABETES MELLITUS WITH OTHER SPECIFIED COMPLICATION, WITH LONG-TERM CURRENT USE OF INSULIN (HCC): ICD-10-CM

## 2025-06-11 DIAGNOSIS — I48.91 ATRIAL FIBRILLATION WITH RAPID VENTRICULAR RESPONSE (HCC): ICD-10-CM

## 2025-06-11 DIAGNOSIS — I48.0 PAROXYSMAL ATRIAL FIBRILLATION (HCC): ICD-10-CM

## 2025-06-11 DIAGNOSIS — D68.69 SECONDARY HYPERCOAGULABLE STATE (HCC): ICD-10-CM

## 2025-06-11 LAB
ANION GAP SERPL CALC-SCNC: 13 MMOL/L (ref 7–16)
BASOPHILS # BLD AUTO: 0.5 % (ref 0–1.8)
BASOPHILS # BLD: 0.02 K/UL (ref 0–0.12)
BUN SERPL-MCNC: 30 MG/DL (ref 8–22)
CALCIUM SERPL-MCNC: 9.7 MG/DL (ref 8.5–10.5)
CHLORIDE SERPL-SCNC: 102 MMOL/L (ref 96–112)
CO2 SERPL-SCNC: 18 MMOL/L (ref 20–33)
CREAT SERPL-MCNC: 1.4 MG/DL (ref 0.5–1.4)
EKG IMPRESSION: NORMAL
EOSINOPHIL # BLD AUTO: 0.02 K/UL (ref 0–0.51)
EOSINOPHIL NFR BLD: 0.5 % (ref 0–6.9)
ERYTHROCYTE [DISTWIDTH] IN BLOOD BY AUTOMATED COUNT: 42.6 FL (ref 35.9–50)
GFR SERPLBLD CREATININE-BSD FMLA CKD-EPI: 39 ML/MIN/1.73 M 2
GLUCOSE BLD STRIP.AUTO-MCNC: 362 MG/DL (ref 65–99)
GLUCOSE SERPL-MCNC: 228 MG/DL (ref 65–99)
HCT VFR BLD AUTO: 43.2 % (ref 37–47)
HGB BLD-MCNC: 13.7 G/DL (ref 12–16)
IMM GRANULOCYTES # BLD AUTO: 0.03 K/UL (ref 0–0.11)
IMM GRANULOCYTES NFR BLD AUTO: 0.7 % (ref 0–0.9)
LV EJECT FRACT MOD 2C 99903: 41.23
LV EJECT FRACT MOD 4C 99902: 53.82
LV EJECT FRACT MOD BP 99901: 48.98
LYMPHOCYTES # BLD AUTO: 0.57 K/UL (ref 1–4.8)
LYMPHOCYTES NFR BLD: 13.6 % (ref 22–41)
MCH RBC QN AUTO: 27.3 PG (ref 27–33)
MCHC RBC AUTO-ENTMCNC: 31.7 G/DL (ref 32.2–35.5)
MCV RBC AUTO: 86.2 FL (ref 81.4–97.8)
MONOCYTES # BLD AUTO: 0.36 K/UL (ref 0–0.85)
MONOCYTES NFR BLD AUTO: 8.6 % (ref 0–13.4)
NEUTROPHILS # BLD AUTO: 3.19 K/UL (ref 1.82–7.42)
NEUTROPHILS NFR BLD: 76.1 % (ref 44–72)
NRBC # BLD AUTO: 0 K/UL
NRBC BLD-RTO: 0 /100 WBC (ref 0–0.2)
PLATELET # BLD AUTO: 98 K/UL (ref 164–446)
PMV BLD AUTO: 11.6 FL (ref 9–12.9)
POTASSIUM SERPL-SCNC: 4.3 MMOL/L (ref 3.6–5.5)
RBC # BLD AUTO: 5.01 M/UL (ref 4.2–5.4)
SODIUM SERPL-SCNC: 133 MMOL/L (ref 135–145)
T4 FREE SERPL-MCNC: 1.75 NG/DL (ref 0.93–1.7)
TROPONIN T SERPL-MCNC: 30 NG/L (ref 6–19)
TROPONIN T SERPL-MCNC: 32 NG/L (ref 6–19)
TSH SERPL DL<=0.005 MIU/L-ACNC: 0.62 UIU/ML (ref 0.38–5.33)
WBC # BLD AUTO: 4.2 K/UL (ref 4.8–10.8)

## 2025-06-11 PROCEDURE — 99223 1ST HOSP IP/OBS HIGH 75: CPT | Mod: AI | Performed by: HOSPITALIST

## 2025-06-11 PROCEDURE — 85025 COMPLETE CBC W/AUTO DIFF WBC: CPT

## 2025-06-11 PROCEDURE — 700102 HCHG RX REV CODE 250 W/ 637 OVERRIDE(OP): Performed by: EMERGENCY MEDICINE

## 2025-06-11 PROCEDURE — 94760 N-INVAS EAR/PLS OXIMETRY 1: CPT

## 2025-06-11 PROCEDURE — 71045 X-RAY EXAM CHEST 1 VIEW: CPT

## 2025-06-11 PROCEDURE — 96376 TX/PRO/DX INJ SAME DRUG ADON: CPT

## 2025-06-11 PROCEDURE — 84443 ASSAY THYROID STIM HORMONE: CPT

## 2025-06-11 PROCEDURE — 96366 THER/PROPH/DIAG IV INF ADDON: CPT

## 2025-06-11 PROCEDURE — G0378 HOSPITAL OBSERVATION PER HR: HCPCS

## 2025-06-11 PROCEDURE — A9270 NON-COVERED ITEM OR SERVICE: HCPCS | Performed by: EMERGENCY MEDICINE

## 2025-06-11 PROCEDURE — 36415 COLL VENOUS BLD VENIPUNCTURE: CPT

## 2025-06-11 PROCEDURE — 82962 GLUCOSE BLOOD TEST: CPT | Performed by: HOSPITALIST

## 2025-06-11 PROCEDURE — 84484 ASSAY OF TROPONIN QUANT: CPT

## 2025-06-11 PROCEDURE — 96372 THER/PROPH/DIAG INJ SC/IM: CPT

## 2025-06-11 PROCEDURE — 700102 HCHG RX REV CODE 250 W/ 637 OVERRIDE(OP): Performed by: HOSPITALIST

## 2025-06-11 PROCEDURE — 700111 HCHG RX REV CODE 636 W/ 250 OVERRIDE (IP): Performed by: EMERGENCY MEDICINE

## 2025-06-11 PROCEDURE — 93005 ELECTROCARDIOGRAM TRACING: CPT | Mod: TC | Performed by: EMERGENCY MEDICINE

## 2025-06-11 PROCEDURE — 99285 EMERGENCY DEPT VISIT HI MDM: CPT

## 2025-06-11 PROCEDURE — 93306 TTE W/DOPPLER COMPLETE: CPT

## 2025-06-11 PROCEDURE — 80048 BASIC METABOLIC PNL TOTAL CA: CPT

## 2025-06-11 PROCEDURE — 96365 THER/PROPH/DIAG IV INF INIT: CPT

## 2025-06-11 PROCEDURE — 700105 HCHG RX REV CODE 258: Performed by: HOSPITALIST

## 2025-06-11 PROCEDURE — 93005 ELECTROCARDIOGRAM TRACING: CPT | Mod: TC

## 2025-06-11 PROCEDURE — 96375 TX/PRO/DX INJ NEW DRUG ADDON: CPT

## 2025-06-11 PROCEDURE — 84439 ASSAY OF FREE THYROXINE: CPT

## 2025-06-11 PROCEDURE — 93306 TTE W/DOPPLER COMPLETE: CPT | Mod: 26 | Performed by: INTERNAL MEDICINE

## 2025-06-11 PROCEDURE — A9270 NON-COVERED ITEM OR SERVICE: HCPCS | Performed by: HOSPITALIST

## 2025-06-11 PROCEDURE — 700111 HCHG RX REV CODE 636 W/ 250 OVERRIDE (IP): Performed by: HOSPITALIST

## 2025-06-11 PROCEDURE — 700101 HCHG RX REV CODE 250: Performed by: EMERGENCY MEDICINE

## 2025-06-11 PROCEDURE — 700105 HCHG RX REV CODE 258: Performed by: EMERGENCY MEDICINE

## 2025-06-11 PROCEDURE — 99223 1ST HOSP IP/OBS HIGH 75: CPT | Performed by: INTERNAL MEDICINE

## 2025-06-11 RX ORDER — AMLODIPINE BESYLATE 10 MG/1
10 TABLET ORAL DAILY
COMMUNITY

## 2025-06-11 RX ORDER — ONDANSETRON 4 MG/1
4 TABLET, ORALLY DISINTEGRATING ORAL EVERY 4 HOURS PRN
Status: DISCONTINUED | OUTPATIENT
Start: 2025-06-11 | End: 2025-06-15 | Stop reason: HOSPADM

## 2025-06-11 RX ORDER — DEXTROSE MONOHYDRATE 25 G/50ML
25 INJECTION, SOLUTION INTRAVENOUS
Status: DISCONTINUED | OUTPATIENT
Start: 2025-06-11 | End: 2025-06-14

## 2025-06-11 RX ORDER — AMLODIPINE BESYLATE 5 MG/1
10 TABLET ORAL DAILY
Status: DISCONTINUED | OUTPATIENT
Start: 2025-06-12 | End: 2025-06-15 | Stop reason: HOSPADM

## 2025-06-11 RX ORDER — POLYETHYLENE GLYCOL 3350 17 G/17G
1 POWDER, FOR SOLUTION ORAL
Status: DISCONTINUED | OUTPATIENT
Start: 2025-06-11 | End: 2025-06-15 | Stop reason: HOSPADM

## 2025-06-11 RX ORDER — INSULIN LISPRO 100 [IU]/ML
1-6 INJECTION, SOLUTION INTRAVENOUS; SUBCUTANEOUS
Status: DISCONTINUED | OUTPATIENT
Start: 2025-06-11 | End: 2025-06-14

## 2025-06-11 RX ORDER — DILTIAZEM HYDROCHLORIDE 5 MG/ML
20 INJECTION INTRAVENOUS ONCE
Status: COMPLETED | OUTPATIENT
Start: 2025-06-11 | End: 2025-06-11

## 2025-06-11 RX ORDER — TRAZODONE HYDROCHLORIDE 50 MG/1
50 TABLET ORAL ONCE
Status: COMPLETED | OUTPATIENT
Start: 2025-06-12 | End: 2025-06-12

## 2025-06-11 RX ORDER — SODIUM CHLORIDE 9 MG/ML
500 INJECTION, SOLUTION INTRAVENOUS ONCE
Status: COMPLETED | OUTPATIENT
Start: 2025-06-11 | End: 2025-06-12

## 2025-06-11 RX ORDER — ERGOCALCIFEROL 1.25 MG/1
50000 CAPSULE, LIQUID FILLED ORAL
Status: DISCONTINUED | OUTPATIENT
Start: 2025-06-11 | End: 2025-06-11

## 2025-06-11 RX ORDER — DILTIAZEM HYDROCHLORIDE 5 MG/ML
10 INJECTION INTRAVENOUS ONCE
Status: COMPLETED | OUTPATIENT
Start: 2025-06-11 | End: 2025-06-11

## 2025-06-11 RX ORDER — MYCOPHENOLATE MOFETIL 250 MG/1
250 CAPSULE ORAL 2 TIMES DAILY
Status: DISCONTINUED | OUTPATIENT
Start: 2025-06-11 | End: 2025-06-15 | Stop reason: HOSPADM

## 2025-06-11 RX ORDER — DAPAGLIFLOZIN 10 MG/1
5 TABLET, FILM COATED ORAL DAILY
Status: DISCONTINUED | OUTPATIENT
Start: 2025-06-12 | End: 2025-06-15 | Stop reason: HOSPADM

## 2025-06-11 RX ORDER — ATORVASTATIN CALCIUM 20 MG/1
20 TABLET, FILM COATED ORAL EVERY EVENING
Status: DISCONTINUED | OUTPATIENT
Start: 2025-06-11 | End: 2025-06-15 | Stop reason: HOSPADM

## 2025-06-11 RX ORDER — PREDNISONE 5 MG/1
5 TABLET ORAL DAILY
Status: DISCONTINUED | OUTPATIENT
Start: 2025-06-12 | End: 2025-06-15 | Stop reason: HOSPADM

## 2025-06-11 RX ORDER — FUROSEMIDE 40 MG/1
80 TABLET ORAL DAILY
Status: DISCONTINUED | OUTPATIENT
Start: 2025-06-12 | End: 2025-06-13

## 2025-06-11 RX ORDER — TACROLIMUS 1 MG/1
1 CAPSULE ORAL 2 TIMES DAILY
COMMUNITY

## 2025-06-11 RX ORDER — INSULIN ASPART 100 [IU]/ML
20 INJECTION, SOLUTION INTRAVENOUS; SUBCUTANEOUS
COMMUNITY

## 2025-06-11 RX ORDER — HYDRALAZINE HYDROCHLORIDE 20 MG/ML
10 INJECTION INTRAMUSCULAR; INTRAVENOUS EVERY 4 HOURS PRN
Status: DISCONTINUED | OUTPATIENT
Start: 2025-06-11 | End: 2025-06-15 | Stop reason: HOSPADM

## 2025-06-11 RX ORDER — INSULIN ASPART 100 [IU]/ML
20 INJECTION, SOLUTION INTRAVENOUS; SUBCUTANEOUS
Status: DISCONTINUED | OUTPATIENT
Start: 2025-06-11 | End: 2025-06-11

## 2025-06-11 RX ORDER — LEVOTHYROXINE SODIUM 88 UG/1
88 TABLET ORAL
Status: DISCONTINUED | OUTPATIENT
Start: 2025-06-12 | End: 2025-06-15 | Stop reason: HOSPADM

## 2025-06-11 RX ORDER — AMOXICILLIN 250 MG
2 CAPSULE ORAL EVERY EVENING
Status: DISCONTINUED | OUTPATIENT
Start: 2025-06-11 | End: 2025-06-15 | Stop reason: HOSPADM

## 2025-06-11 RX ORDER — ACETAMINOPHEN 325 MG/1
650 TABLET ORAL EVERY 6 HOURS PRN
Status: DISCONTINUED | OUTPATIENT
Start: 2025-06-11 | End: 2025-06-15 | Stop reason: HOSPADM

## 2025-06-11 RX ORDER — PREDNISONE 5 MG/1
5 TABLET ORAL DAILY
COMMUNITY

## 2025-06-11 RX ORDER — MYCOPHENOLATE MOFETIL 250 MG/1
250 CAPSULE ORAL 2 TIMES DAILY
COMMUNITY

## 2025-06-11 RX ORDER — TACROLIMUS 1 MG/1
1 CAPSULE ORAL 2 TIMES DAILY
Status: DISCONTINUED | OUTPATIENT
Start: 2025-06-11 | End: 2025-06-15 | Stop reason: HOSPADM

## 2025-06-11 RX ORDER — ONDANSETRON 2 MG/ML
4 INJECTION INTRAMUSCULAR; INTRAVENOUS EVERY 4 HOURS PRN
Status: DISCONTINUED | OUTPATIENT
Start: 2025-06-11 | End: 2025-06-15 | Stop reason: HOSPADM

## 2025-06-11 RX ORDER — METOPROLOL TARTRATE 1 MG/ML
5 INJECTION, SOLUTION INTRAVENOUS
Status: DISCONTINUED | OUTPATIENT
Start: 2025-06-11 | End: 2025-06-15 | Stop reason: HOSPADM

## 2025-06-11 RX ORDER — METOPROLOL TARTRATE 25 MG/1
25 TABLET, FILM COATED ORAL ONCE
Status: COMPLETED | OUTPATIENT
Start: 2025-06-11 | End: 2025-06-11

## 2025-06-11 RX ORDER — METOPROLOL SUCCINATE 25 MG/1
25 TABLET, EXTENDED RELEASE ORAL DAILY
Status: DISCONTINUED | OUTPATIENT
Start: 2025-06-12 | End: 2025-06-15 | Stop reason: HOSPADM

## 2025-06-11 RX ORDER — METOPROLOL TARTRATE 1 MG/ML
5 INJECTION, SOLUTION INTRAVENOUS ONCE
Status: COMPLETED | OUTPATIENT
Start: 2025-06-11 | End: 2025-06-11

## 2025-06-11 RX ADMIN — METOPROLOL TARTRATE 5 MG: 5 INJECTION INTRAVENOUS at 12:15

## 2025-06-11 RX ADMIN — DILTIAZEM HYDROCHLORIDE 5 MG/HR: 5 INJECTION INTRAVENOUS at 14:12

## 2025-06-11 RX ADMIN — SENNOSIDES AND DOCUSATE SODIUM 2 TABLET: 50; 8.6 TABLET ORAL at 18:34

## 2025-06-11 RX ADMIN — MYCOPHENOLATE MOFETIL 250 MG: 250 CAPSULE ORAL at 18:33

## 2025-06-11 RX ADMIN — DILTIAZEM HYDROCHLORIDE 20 MG: 5 INJECTION INTRAVENOUS at 13:39

## 2025-06-11 RX ADMIN — DILTIAZEM HYDROCHLORIDE 5 MG/HR: 5 INJECTION INTRAVENOUS at 14:38

## 2025-06-11 RX ADMIN — INSULIN GLARGINE-YFGN 20 UNITS: 100 INJECTION, SOLUTION SUBCUTANEOUS at 20:30

## 2025-06-11 RX ADMIN — SODIUM CHLORIDE 500 ML: 9 INJECTION, SOLUTION INTRAVENOUS at 13:02

## 2025-06-11 RX ADMIN — TACROLIMUS 1 MG: 1 CAPSULE ORAL at 18:34

## 2025-06-11 RX ADMIN — METOPROLOL TARTRATE 25 MG: 25 TABLET, FILM COATED ORAL at 11:27

## 2025-06-11 RX ADMIN — INSULIN LISPRO 5 UNITS: 100 INJECTION, SOLUTION INTRAVENOUS; SUBCUTANEOUS at 20:30

## 2025-06-11 RX ADMIN — DILTIAZEM HYDROCHLORIDE 10 MG: 5 INJECTION INTRAVENOUS at 13:03

## 2025-06-11 RX ADMIN — APIXABAN 2.5 MG: 2.5 TABLET, FILM COATED ORAL at 18:33

## 2025-06-11 RX ADMIN — DILTIAZEM HYDROCHLORIDE 10 MG/HR: 5 INJECTION INTRAVENOUS at 16:20

## 2025-06-11 RX ADMIN — ATORVASTATIN CALCIUM 20 MG: 20 TABLET, FILM COATED ORAL at 18:34

## 2025-06-11 ASSESSMENT — ENCOUNTER SYMPTOMS
RESPIRATORY NEGATIVE: 1
WHEEZING: 0
HEMOPTYSIS: 0
NAUSEA: 0
CONSTIPATION: 0
HEARTBURN: 0
ABDOMINAL PAIN: 0
DIZZINESS: 0
FEVER: 0
DIAPHORESIS: 0
DIARRHEA: 0
PALPITATIONS: 1
CHILLS: 0
CONSTITUTIONAL NEGATIVE: 1
FOCAL WEAKNESS: 0
DOUBLE VISION: 0
MUSCULOSKELETAL NEGATIVE: 1
BLOOD IN STOOL: 0
NERVOUS/ANXIOUS: 0
VOMITING: 0
PSYCHIATRIC NEGATIVE: 1
EYES NEGATIVE: 1
NEUROLOGICAL NEGATIVE: 1
BRUISES/BLEEDS EASILY: 0
COUGH: 0
SEIZURES: 0
GASTROINTESTINAL NEGATIVE: 1
LOSS OF CONSCIOUSNESS: 0
HEADACHES: 0

## 2025-06-11 ASSESSMENT — SOCIAL DETERMINANTS OF HEALTH (SDOH)
WITHIN THE LAST YEAR, HAVE YOU BEEN AFRAID OF YOUR PARTNER OR EX-PARTNER?: NO
WITHIN THE PAST 12 MONTHS, THE FOOD YOU BOUGHT JUST DIDN'T LAST AND YOU DIDN'T HAVE MONEY TO GET MORE: NEVER TRUE
WITHIN THE LAST YEAR, HAVE YOU BEEN KICKED, HIT, SLAPPED, OR OTHERWISE PHYSICALLY HURT BY YOUR PARTNER OR EX-PARTNER?: NO
WITHIN THE LAST YEAR, HAVE YOU BEEN HUMILIATED OR EMOTIONALLY ABUSED IN OTHER WAYS BY YOUR PARTNER OR EX-PARTNER?: NO
WITHIN THE PAST 12 MONTHS, YOU WORRIED THAT YOUR FOOD WOULD RUN OUT BEFORE YOU GOT THE MONEY TO BUY MORE: NEVER TRUE
IN THE PAST 12 MONTHS, HAS THE ELECTRIC, GAS, OIL, OR WATER COMPANY THREATENED TO SHUT OFF SERVICE IN YOUR HOME?: NO
WITHIN THE LAST YEAR, HAVE TO BEEN RAPED OR FORCED TO HAVE ANY KIND OF SEXUAL ACTIVITY BY YOUR PARTNER OR EX-PARTNER?: NO

## 2025-06-11 ASSESSMENT — CHA2DS2 SCORE
AGE 65 TO 74: NO
VASCULAR DISEASE: NO
PRIOR STROKE OR TIA OR THROMBOEMBOLISM: NO
CHF OR LEFT VENTRICULAR DYSFUNCTION: YES
DIABETES: YES
SEX: FEMALE
HYPERTENSION: YES
CHA2DS2 VASC SCORE: 6
AGE 75 OR GREATER: YES

## 2025-06-11 ASSESSMENT — LIFESTYLE VARIABLES
AVERAGE NUMBER OF DAYS PER WEEK YOU HAVE A DRINK CONTAINING ALCOHOL: 0
EVER HAD A DRINK FIRST THING IN THE MORNING TO STEADY YOUR NERVES TO GET RID OF A HANGOVER: NO
TOTAL SCORE: 0
ALCOHOL_USE: NO
EVER FELT BAD OR GUILTY ABOUT YOUR DRINKING: NO
ON A TYPICAL DAY WHEN YOU DRINK ALCOHOL HOW MANY DRINKS DO YOU HAVE: 0
TOTAL SCORE: 0
TOTAL SCORE: 0
HOW MANY TIMES IN THE PAST YEAR HAVE YOU HAD 5 OR MORE DRINKS IN A DAY: 0
HAVE PEOPLE ANNOYED YOU BY CRITICIZING YOUR DRINKING: NO
CONSUMPTION TOTAL: NEGATIVE
HAVE YOU EVER FELT YOU SHOULD CUT DOWN ON YOUR DRINKING: NO
DOES PATIENT WANT TO STOP DRINKING: NO

## 2025-06-11 ASSESSMENT — FIBROSIS 4 INDEX
FIB4 SCORE: 2.94
FIB4 SCORE: 2.44

## 2025-06-11 ASSESSMENT — VISUAL ACUITY: OU: 1

## 2025-06-11 ASSESSMENT — PAIN DESCRIPTION - PAIN TYPE
TYPE: ACUTE PAIN
TYPE: ACUTE PAIN;CHRONIC PAIN

## 2025-06-11 NOTE — ED PROVIDER NOTES
ED Provider Note    CHIEF COMPLAINT  Chief Complaint   Patient presents with    Rapid Heart Beat     Was at Cardiology today   was told HR was fast   Hx of cardiac issues   pt and son unsure why there were sent here         EXTERNAL RECORDS REVIEWED  Outpatient Notes from outpatient cardiology clinic where patient arrived for nuc med for treadmill stress test.  History PAF, currently on metoprolol.  EKG showed A-fib with -136.  Cardiologist recommended ED for rate control.    HPI/ROS  LIMITATION TO HISTORY   Select: Language Azeri,  Used   OUTSIDE HISTORIAN(S):  Family Son, also , declines ipad    Terelatricia Gallegos is a 74 y.o. female who presents to the emergency department through triage with son from cardiology office for rapid heartbeat.  Patient with history of atrial fibrillation, states she is compliant with home medications but has not taken her Eliquis for 3 days as she was advised not to prior to this procedure/study.  Otherwise denies any chest pain, shortness of breath.  Denies palpitations.  Denies dizziness or syncope.  Denies nausea or vomiting.    PAST MEDICAL HISTORY   has a past medical history of Acquired hypothyroidism (05/04/2020), CAD (coronary artery disease), Chronic diastolic heart failure (HCC) (05/04/2020), Coronary artery disease due to lipid rich plaque, Dental disorder, Diabetes ([facility]), ESRD (end stage renal disease) on dialysis ([facility]) (05/04/2020), Hemodialysis patient ([facility]), Hyperlipidemia, Hypertension, Kidney transplant candidate, Kidney transplant recipient (10/31/2022), Presence of drug-eluting stent in right coronary artery, QT prolongation (01/22/2020), RLS (restless legs syndrome) (08/05/2016), and Transaminitis (12/22/2018).    SURGICAL HISTORY   has a past surgical history that includes recovery (08/16/2016); other abdominal surgery; other (Left, 2014); zzz cardiac cath (08/16/2016); zzz cardiac cath (09/07/2016); other abdominal surgery  (Right, 10/31/2022); and ureteral reimplantation (08/07/2023).    FAMILY HISTORY  Family History   Problem Relation Age of Onset    Diabetes Sister     Other Sister         liver disease    Diabetes Brother     Heart Disease Neg Hx        SOCIAL HISTORY  Social History     Tobacco Use    Smoking status: Never    Smokeless tobacco: Never   Vaping Use    Vaping status: Never Used   Substance and Sexual Activity    Alcohol use: No     Alcohol/week: 0.0 oz    Drug use: No    Sexual activity: Not Currently       CURRENT MEDICATIONS  Home Medications       Reviewed by Akila Garza M.D. (Physician) on 06/11/25 at 1400  Med List Status: Complete     Medication Last Dose Status   amLODIPine (NORVASC) 10 MG Tab New Rx Active   apixaban (ELIQUIS) 2.5mg Tab 6/11/2025 Active   atorvastatin (LIPITOR) 20 MG Tab 6/10/2025 Active   BD PEN NEEDLE PETE 2ND GEN  Active   Continuous Glucose  (FREESTYLE BALDOMERO 3 READER) Device  Active   Continuous Glucose Sensor (FREESTYLE BALDOMERO 3 PLUS SENSOR) Misc  Active   Continuous Glucose Sensor (FREESTYLE BALDOMERO 3 SENSOR) Misc  Active   FARXIGA 5 MG Tab Unknown Active   furosemide (LASIX) 80 MG Tab Unknown Active   glucose blood (ACCU-CHEK GUIDE) strip  Active   insulin aspart (NOVOLOG) 100 UNIT/ML Solution Unknown Active   Lancets  Active   LANTUS SOLOSTAR 100 UNIT/ML Solution Pen-injector injection Unknown Active   levothyroxine (SYNTHROID) 88 MCG Tab Unknown Active   metoprolol SR (TOPROL XL) 25 MG TABLET SR 24 HR Unknown Active   mycophenolate (CELLCEPT) 250 MG Cap Unknown Active   predniSONE (DELTASONE) 5 MG Tab Unknown Active   tacrolimus (PROGRAF) 1 MG Cap Unknown Active   vitamin D2, Ergocalciferol, (DRISDOL) 1.25 MG (68631 UT) Cap capsule Not Taking Active                  Audit from Redirected Encounters    **Home medications have not yet been reviewed for this encounter**         ALLERGIES  Allergies[1]    PHYSICAL EXAM  VITAL SIGNS: /73   Pulse (!) 125   Temp 36.4 °C  (97.6 °F) (Temporal)   Resp 18   Ht 1.524 m (5')   Wt 54.4 kg (120 lb)   LMP  (LMP Unknown)   SpO2 97%   BMI 23.44 kg/m²    Pulse ox interpretation: I interpret this pulse ox as normal.  Constitutional: Alert in no apparent distress.  HENT: Normocephalic, atraumatic. Bilateral external ears normal, Nose normal. Moist mucous membranes.    Eyes: Pupils are equal and reactive, Conjunctiva normal.   Neck: Normal range of motion, Supple.  No JVD.  Lymphatic: No lymphadenopathy noted.   Cardiovascular: Irregularly irregular, tachycardic, no murmurs. Distal pulses intact.  No peripheral edema.  Thorax & Lungs: Normal breath sounds.  No wheezing/rales/ronchi. No increased work of breathing, clipped speech or retractions.   Abdomen: Soft, non-distended, non-tender to palpation.   Skin: Warm, Dry, No erythema, No rash.   Musculoskeletal: Good range of motion in all major joints.   Neurologic: Alert and orient x 4.  Speech clear and cohesive.  Moves 4 extremity spontaneously.  Psychiatric: Affect normal, Judgment normal, Mood normal.       EKG/LABS  Results for orders placed or performed during the hospital encounter of 06/11/25   CBC w/ Differential    Collection Time: 06/11/25 10:55 AM   Result Value Ref Range    WBC 4.2 (L) 4.8 - 10.8 K/uL    RBC 5.01 4.20 - 5.40 M/uL    Hemoglobin 13.7 12.0 - 16.0 g/dL    Hematocrit 43.2 37.0 - 47.0 %    MCV 86.2 81.4 - 97.8 fL    MCH 27.3 27.0 - 33.0 pg    MCHC 31.7 (L) 32.2 - 35.5 g/dL    RDW 42.6 35.9 - 50.0 fL    Platelet Count 98 (L) 164 - 446 K/uL    MPV 11.6 9.0 - 12.9 fL    Neutrophils-Polys 76.10 (H) 44.00 - 72.00 %    Lymphocytes 13.60 (L) 22.00 - 41.00 %    Monocytes 8.60 0.00 - 13.40 %    Eosinophils 0.50 0.00 - 6.90 %    Basophils 0.50 0.00 - 1.80 %    Immature Granulocytes 0.70 0.00 - 0.90 %    Nucleated RBC 0.00 0.00 - 0.20 /100 WBC    Neutrophils (Absolute) 3.19 1.82 - 7.42 K/uL    Lymphs (Absolute) 0.57 (L) 1.00 - 4.80 K/uL    Monos (Absolute) 0.36 0.00 - 0.85 K/uL     Eos (Absolute) 0.02 0.00 - 0.51 K/uL    Baso (Absolute) 0.02 0.00 - 0.12 K/uL    Immature Granulocytes (abs) 0.03 0.00 - 0.11 K/uL    NRBC (Absolute) 0.00 K/uL   Basic Metabolic Panel (BMP)    Collection Time: 25 10:55 AM   Result Value Ref Range    Sodium 133 (L) 135 - 145 mmol/L    Potassium 4.3 3.6 - 5.5 mmol/L    Chloride 102 96 - 112 mmol/L    Co2 18 (L) 20 - 33 mmol/L    Glucose 228 (H) 65 - 99 mg/dL    Bun 30 (H) 8 - 22 mg/dL    Creatinine 1.40 0.50 - 1.40 mg/dL    Calcium 9.7 8.5 - 10.5 mg/dL    Anion Gap 13.0 7.0 - 16.0   Troponin - STAT Once    Collection Time: 25 10:55 AM   Result Value Ref Range    Troponin T 32 (H) 6 - 19 ng/L   ESTIMATED GFR    Collection Time: 25 10:55 AM   Result Value Ref Range    GFR (CKD-EPI) 39 (A) >60 mL/min/1.73 m 2   TROPONIN    Collection Time: 25 12:53 PM   Result Value Ref Range    Troponin T 32 (H) 6 - 19 ng/L   EKG    Collection Time: 25  2:02 PM   Result Value Ref Range    Report       [facility] Emergency Dept.    Test Date:  2025  Pt Name:    DIOR NICHOLS    Department: [facility]  MRN:        [id number]                      [facility]  Gender:     Female                       Technician: [id number]  :        [id number]                   Requested By:ER TRIAGE PROTOCOL  Order #:    [id number]                    Reading MD: RASHAD DONG DO    Measurements  Intervals                                Axis  Rate:       134                          P:          0  ID:         0                            QRS:        59  QRSD:       92                           T:          176  QT:         303  QTc:        453    Interpretive Statements  Atrial flutter with predominant 2:1 AV block  Anteroseptal infarct, age indeterminate  Compared to ECG 2025 17:18:32  2:1 AV block now present  Sinus tachycardia no longer present    Electronically Signed On 2025 14:02:43 PDT by RASHAD DONG DO          I have independently interpreted this EKG    RADIOLOGY/PROCEDURES   I have independently interpreted the diagnostic imaging associated with this visit and am waiting the final reading from the radiologist.   My preliminary interpretation is as follows:   Chest x-ray: Normal lung fields, no consolidation or effusion    Radiologist interpretation:  DX-CHEST-PORTABLE (1 VIEW)   Final Result      Cardiomegaly.          COURSE & MEDICAL DECISION MAKING    ASSESSMENT, COURSE AND PLAN  Care Narrative:   1055 -evaluated at bedside.  Asymptomatic, denies any chest pain, shortness of breath, palpitations or syncope.  Referred from cardiology office where she was found to be A-fib with RVR.  History of paroxysmal atrial fibrillation, compliant with medications and scheduled for a treadmill stress test today.  She states she has not taken Eliquis in 3 days as she was told to hold this prior to the study.  I suspect she may have been told to hold her rate control medication, metoprolol and that there was some confusion but nonetheless she is asymptomatic during this bedside evaluation.  Tachycardic without hypotension.  Will give metoprolol orally, labs and reassess.    No change  in rate with oral metoprolol.  Add IV Lopressor.    Mild leukopenia without anemia.  CO2 is 18, glucose 228 without other electrolyte derangement.  Renal function is preserved.  Troponin indeterminate at 32, however this appears to be baseline for patient.  Will trend.    No change with IV Lopressor.  Blood pressure remained stable.  Add diltiazem and small fluid bolus.  Remains asymptomatic without palpitations, chest pain or shortness of breath.    After further pharmacy review sounds like patient has been taking Eliquis, unsure which medications she avoided preceding this brand procedure today.  Anticipate it may have been her beta-blocker.    1:39 PM Patient had brief decrease in rate to 80 then return to 115  after 10 mg of Diltiazem.  Now  120s again.  Repeat diltiazem and discussed with cardiology.  Second troponin pending.  Patient remains asymptomatic.    1:48 PM patient still in A-fib but rate controlled 60s to 80s after additional 20 mg of diltiazem.  Will start diltiazem drip and allow for cardiology consultation for ongoing med management.  She will be hospitalized for further evaluation and treatment.    ADDITIONAL PROBLEMS MANAGED  PAF   CAD  Chronic heart failure with preserved ejection fraction  Chronic kidney disease    DISPOSITION AND DISCUSSIONS  I have discussed management of the patient with the following physicians and BETTE's:    1:44 PM Cardiology nurse practitioner is aware of patient presentation and will have Dr. Orona call back when available to offer additional medication management.    1:55 PM Dr. Orona is aware of the patient presentation and pharmacologic interventions thus far.  Given response after second IV push dose diltiazem, agrees with fixed rate diltiazem drip and agreeable to consultation.    2:01 PM Dr. Garza patient and agreeable to consultation.    CRITICAL CARE  The very real possibilty of a deterioration of this patient's condition required the highest level of my preparedness for sudden, emergent intervention.  I provided critical care services, which included medication orders, frequent reevaluations of the patient's condition and response to treatment, ordering and reviewing test results, and discussing the case with various consultants.  The critical care time associated with the care of the patient was 35 minutes. Review chart for interventions. This time is exclusive of any other billable procedures.       FINAL DIAGNOSIS  1. Atrial fibrillation with RVR ([facility])         Electronically signed by: Vicki Maddox D.O., 6/11/2025 11:02 AM           [1] No Known Allergies

## 2025-06-11 NOTE — ED TRIAGE NOTES
"/78   Pulse (!) 131   Temp 36.4 °C (97.6 °F) (Temporal)   Resp 18   Ht 1.524 m (5')   Wt 54.4 kg (120 lb)   LMP  (LMP Unknown)   SpO2 97%   BMI 23.44 kg/m²   Chief Complaint   Patient presents with    Rapid Heart Beat     Was at Cardiology today   was told HR was fast   Hx of cardiac issues   pt and son unsure why there were sent here       Comes in w/ son   was at her cardiologist office for a routine visit  found her HR was \"fast\" and sent here for further evaluation of this    EKG done in triage   Hx of kidney transplant 2 1/2 yrs ago   doing well per son   "

## 2025-06-11 NOTE — ED NOTES
Medication history reviewed with pts pharmacies ([facility] and [facility]). Med rec is complete.  Allergies reviewed, per pt    Used  services spoke with (Silvana) [id number]. Per son reports that pt takes care of her own medications.  I felt like pt was not sure what medications she was taking or when she took her medications last.  I asked what pharmacy she picks up her medications. Per Son and pt reports that she receives her medications from [facility] and [facility]    Called [facility] at [phone] to verify all medications.  Pt last filled AMLODIPINE 10MG on 12/2023, per [facility] pt has a prescription for   AMLODIPINE 10MG ready to be picked up.Per [facility] pt never filled her D2 50,000 units.    [phone] to verify all medications.    Patient has not had any outpatient antibiotics in the last 30 days, per pts pharmacies     Pt takes ELIQUIS 2.5MG, pt reports that she took this medication today at 1000 told the doctor she last took this medication 3 days ago.  Showed pt picture of ELIQUIS 5MG, per pt reports that she did take this medication today at 1000.    Pt is not sure which medication she was told to stop for 3 days.     Dispense history available in EPIC? Some medications

## 2025-06-11 NOTE — ASSESSMENT & PLAN NOTE
Patient was scheduled for an outpatient stress test and with this the patient's metoprolol was held for 3 days.  Because of this she went into atrial fibrillation with rapid ventricular response.  Dilt drip 6/11-6/12  Cardioversion on 6/12 was successful, she remains in normal sinus rhythm  Continue to metoprolol XL 25 daily  Continue Eliquis 2.5 twice daily   Reschedule outpatient stress test

## 2025-06-11 NOTE — ASSESSMENT & PLAN NOTE
Sugars are not at goal  Continue Lantus and 30 units in the morning  Increase Lantus to 22 units at night  Incr SSI  -follow glycohemoglobin levels long term, very poorly controlled most recent hemoglobin A1c is 11.4  -monitor for hypoglycemic episodes and adjust control if he should get low

## 2025-06-11 NOTE — CONSULTS
Cardiology Initial Consultation    Date of Service  6/11/2025    Referring Physician  Vicki Maddox MD    Reason for Consultation  Atrial fibrillation with RVR     services were used in the patient's primary language of [place].     Name or Number: [id number]  Mode of interpretation: iPad    Content of Interpretation:  Per below    History of Presenting Illness  Tere Gallegos is a 74 y.o. female with a past medical history of paroxysmal A-fib, CAD with  RCA with left-to-right collateral; NOEMI ×2 to RCA on 9/7/16, HFpEF, renal transplant on immunosuppression therapy, CKD 3a, T2DM who presented 6/11/2025 with atrial fibrillation with rapid ventricular rate noted prior to undergoing outpatient stress testing and was referred to ER.    Patient reports she has had chest pain for the last 3 weeks and has discussed this with her primary cardiologist, Dr. Crawlye who she saw on 5/14/2025 who ordered stress testing as well as cardiac event monitoring.  Otherwise, patient feels well, denies shortness of breath, palpitations, dizziness/lightheadedness, orthopnea, PND or Edema.  Patient denies any other medication changes besides missing Eliquis.    Patient does have elevated troponins at baseline.     EKG during her last cardiology visit did show sinus tachycardia.  Patient reports she did not take her Eliquis for the last 3 days and she resumed it this morning.    Patient is well-known to cardiology office where they have discussed ablation with EP in the past.  No decision was made at that time to pursue ablation.      Review of Systems  Review of Systems Complete review of systems negative except as noted in HPI/subjective      Past Medical History   has a past medical history of Acquired hypothyroidism (05/04/2020), CAD (coronary artery disease), Chronic diastolic heart failure (HCC) (05/04/2020), Coronary artery disease due to lipid rich plaque, Dental disorder, Diabetes (HCC), ESRD (end  stage renal disease) on dialysis ([facility]) (2020), Hemodialysis patient ([facility]), Hyperlipidemia, Hypertension, Kidney transplant candidate, Kidney transplant recipient (10/31/2022), Presence of drug-eluting stent in right coronary artery, QT prolongation (2020), RLS (restless legs syndrome) (2016), and Transaminitis (2018).    Surgical History   has a past surgical history that includes recovery (2016); other abdominal surgery; other (Left, ); zzz cardiac cath (2016); zzz cardiac cath (2016); other abdominal surgery (Right, 10/31/2022); and ureteral reimplantation (2023).    Family History  Family History   Problem Relation Age of Onset    Diabetes Sister     Other Sister         liver disease    Diabetes Brother     Heart Disease Neg Hx        Social History   reports that she has never smoked. She has never used smokeless tobacco. She reports that she does not drink alcohol and does not use drugs.    Medications  Prior to Admission Medications   Prescriptions Last Dose Informant Patient Reported? Taking?   BD PEN NEEDLE PETE 2ND GEN  Patient's Home Pharmacy No No   Sig: USE AS DIRECTED WITH INSULIN PENS SIX TIMES DAILY   Continuous Glucose  (FREESTYLE BALDOMERO 3 READER) Device  Patient's Home Pharmacy No No   Sig: Use 1 each continuously.   Continuous Glucose Sensor (FREESTYLE BALDOMERO 3 PLUS SENSOR) [facility]  Patient's Home Pharmacy No No   Sig: Apply every 15 days   Continuous Glucose Sensor (FREESTYLE BALDOMERO 3 SENSOR) [facility]  Patient's Home Pharmacy No No   Si Each every 14 days.   FARXIGA 5 MG Tab Unknown Patient's Home Pharmacy No No   Sig: Take 1 Tablet by mouth every day. Por diabetes   LANTUS SOLOSTAR 100 UNIT/ML Solution Pen-injector injection Unknown Patient's Home Pharmacy No No   Sig: INJECT 10 UNITS UNDER THE SKIN 2 TIMES A DAY. 5 PENS PER MONTH   Patient taking differently: Inject 20-30 Units under the skin 2 times a day. Pt takes 30 units AM and 20 units  in the evening   Lancets  Patient's Home Pharmacy No No   Sig: Use one covered lancet to test blood sugar three times daily.   amLODIPine (NORVASC) 10 MG Tab New Rx Patient's Home Pharmacy Yes Yes   Sig: Take 10 mg by mouth every day. Per [facility], there is a prescription there wait to be picked up (6/11/2025)   apixaban (ELIQUIS) 2.5mg Tab 6/11/2025 at 10:00 AM Patient's Home Pharmacy No Yes   Sig: Take 1 Tablet by mouth 2 times a day. TOME 1 TABLETA POR VIA ORAL DOS VECES AL DESMOND   atorvastatin (LIPITOR) 20 MG Tab 6/10/2025 Evening Patient's Home Pharmacy No Yes   Sig: TOME 1 TABLETA POR VIA ORAL TODOS LOS MCCORMICK EN LA NOCHE   furosemide (LASIX) 80 MG Tab Unknown Patient's Home Pharmacy No No   Sig: Take 1 Tablet by mouth every day.   glucose blood (ACCU-CHEK GUIDE) strip  Patient's Home Pharmacy No No   Sig: USE ONE COVERED STRIP TO TEST BLOOD SUGAR THREE TIMES DAILY.   insulin aspart (NOVOLOG) 100 UNIT/ML Solution Unknown Patient's Home Pharmacy Yes No   Sig: Inject 20 Units under the skin 3 times a day before meals. Per [facility] pt is to take 20 units TID with meals   No sliding scale or carb counting   levothyroxine (SYNTHROID) 88 MCG Tab Unknown Patient's Home Pharmacy No No   Sig: Take 1 Tablet by mouth every morning on an empty stomach.   metoprolol SR (TOPROL XL) 25 MG TABLET SR 24 HR Unknown Patient's Home Pharmacy Yes No   Sig: Take 25 mg by mouth every day.   mycophenolate (CELLCEPT) 250 MG Cap Unknown Patient's Home Pharmacy Yes No   Sig: Take 250 mg by mouth 2 times a day.   predniSONE (DELTASONE) 5 MG Tab Unknown Patient's Home Pharmacy Yes No   Sig: Take 5 mg by mouth every day.   tacrolimus (PROGRAF) 1 MG Cap Unknown Patient's Home Pharmacy Yes No   Sig: Take 1 mg by mouth 2 times a day.   vitamin D2, Ergocalciferol, (DRISDOL) 1.25 MG (17171 UT) Cap capsule Not Taking Patient's Home Pharmacy No No   Sig: Take 1 Capsule by mouth every 7 days.   Patient not taking: Reported on 6/11/2025       Facility-Administered Medications: None       Allergies  Allergies[1]    Vital signs in last 24 hours  Temp:  [36.4 °C (97.6 °F)] 36.4 °C (97.6 °F)  Pulse:  [] 124  Resp:  [13-20] 15  BP: (108-124)/(60-79) 108/60  SpO2:  [90 %-99 %] 98 %    Physical Exam  Physical Exam  Vitals reviewed.   Constitutional:       General: She is not in acute distress.     Appearance: Normal appearance.   HENT:      Head: Normocephalic and atraumatic.   Eyes:      Extraocular Movements: Extraocular movements intact.      Conjunctiva/sclera: Conjunctivae normal.   Cardiovascular:      Rate and Rhythm: Tachycardia present. Rhythm irregular.      Pulses: Normal pulses.      Heart sounds: Normal heart sounds.   Pulmonary:      Effort: Pulmonary effort is normal.      Breath sounds: Normal breath sounds.   Musculoskeletal:      Right lower leg: No edema.      Left lower leg: No edema.   Skin:     General: Skin is warm and dry.      Findings: No rash.   Neurological:      Mental Status: She is alert.         Lab Review  Lab Results   Component Value Date/Time    WBC 4.2 (L) 06/11/2025 10:55 AM    RBC 5.01 06/11/2025 10:55 AM    HEMOGLOBIN 13.7 06/11/2025 10:55 AM    HEMATOCRIT 43.2 06/11/2025 10:55 AM    MCV 86.2 06/11/2025 10:55 AM    MCH 27.3 06/11/2025 10:55 AM    MCHC 31.7 (L) 06/11/2025 10:55 AM    MPV 11.6 06/11/2025 10:55 AM      Lab Results   Component Value Date/Time    SODIUM 133 (L) 06/11/2025 10:55 AM    POTASSIUM 4.3 06/11/2025 10:55 AM    CHLORIDE 102 06/11/2025 10:55 AM    CO2 18 (L) 06/11/2025 10:55 AM    GLUCOSE 228 (H) 06/11/2025 10:55 AM    BUN 30 (H) 06/11/2025 10:55 AM    CREATININE 1.40 06/11/2025 10:55 AM    BUNCREATRAT 8 (L) 01/28/2021 07:00 AM      Lab Results   Component Value Date/Time    ASTSGOT 18 04/05/2025 08:21 AM    ALTSGPT 22 04/05/2025 08:21 AM     Lab Results   Component Value Date/Time    CHOLSTRLTOT 134 04/05/2025 08:21 AM    LDL 47 04/05/2025 08:21 AM    HDL 35 (A) 04/05/2025 08:21 AM     "TRIGLYCERIDE 259 (H) 04/05/2025 08:21 AM    TROPONINT 32 (H) 06/11/2025 02:37 PM       No results for input(s): \"NTPROBNP\" in the last 72 hours.    Cardiac Imaging and Procedures Review  EKG:  My personal interpretation of the EKG dated 6/11/2025 is atrial fibrillation with ventricular rate 134    Echocardiogram (4/3/2023):  Normal left ventricular size, thickness, systolic function, and   diastolic function.  Mild mitral regurgitation.  Normal inferior vena cava size and inspiratory collapse.  Estimated right ventricular systolic pressure is 40 mmHg.    Cardiac Catheterization (6/8/2021):    POSTOPERATIVE DIAGNOSIS:  1.  Nonobstructive coronary artery disease.  2.  Widely patent previously placed RCA stents  3.  LVEF 70%, LVEDP 22 mmHg  FINDINGS:  I. HEMODYNAMICS:               Ao: 110/54 mmHg              LEDP: 22 mmHg              Gradient on LV pullback: No     II. LEFT VENTRICULOGRAM:              LVEF JOHNSON PROJECTION: 70%                III. CORONARY ANGIOGRAPHY:  Left Main: Large caliber bifurcating no CAD.  Left Anterior Descending: Moderate to large caliber vessel with usual complement of diagonals.  Mild nonobstructive CAD.  Left Circumflex: Large caliber Co-dominant supplying multiple large tortuous obtuse marginals with mild nonobstructive CAD.  Right Coronary: Small caliber supplying a moderate-sized RPDA.  This is a codominant fashion.  There is a widely patent long segment of previously placed stents in the midportion.    NM-Stress (12/2022): Normal Lexiscan myocardial perfusion study.   No evidence of ischemia or infarct.   SDS 0.   LVEF 70%.   No ischemic changes with Regadenoson.   No arrhythmias.   No chest pain.   ECG INTERPRETATION   Negative stress ECG for ischemia.    Imaging  Chest X-Ray (6/11/2025):    There is no evidence of focal consolidation or evidence of pulmonary edema.  There is no pleural effusion.  The heart is enlarged.  Postsurgical changes are again seen. "     Assessment/Plan  #Atrial fibrillation with RVR  #CAD  s/p PCI to RCA (2016)  #HFpEF  #Hypertension  #Hyperlipidemia  #Hypothyroid  #End-stage renal disease s/p kidney transplant (2022)  #CKD 3 AA  - Last DCCV in 2023.  Previously seen by EP in 2023  - Euvolemic on exam  -Chest pain with baseline elevated troponins, no delta  - Echo  - Resume Eliquis 2.5 mg twice daily  - Continue metoprolol XL 25 mg daily  -Continue diltiazem drip  -Telemetry monitoring  -Check thyroid function  - N.p.o. at midnight  -VELMA/DCCV tomorrow if no conversion overnight  -Chemical stress test once rate controlled    Thank you for allowing me to participate in the care of this patient.    I will continue to follow this patient    Please contact me with any questions.    Please see Dr. Orona's attestation for further details and MDM.       MALGORZATA Hyde, CCK, HF-CERT   [facility]  [phone]    PLEASE NOTE: This Note was created using voice recognition Software. I have made every reasonable attempt to correct obvious errors, but I expect that there are errors of grammar and possibly content that I did not discover before finalizing the note                [1] No Known Allergies

## 2025-06-11 NOTE — H&P
Hospital Medicine History & Physical Note    Date of Service  6/11/2025    Primary Care Physician  ANGELITA Concepcion    Consultants  cardiology    Specialist Names: Vicki Morales APN    Code Status  Full Code    Chief Complaint  Chief Complaint   Patient presents with    Rapid Heart Beat     Was at Cardiology today   was told HR was fast   Hx of cardiac issues   pt and son unsure why there were sent here         History of Presenting Illness  Tere Gallegos is a 74 y.o. female who presented 6/11/2025 with palpitations.  Patient was stopped her chronic atrial fibrillation management because she was scheduled for stress test.  After completing a stress test she has felt palpitations in her chest.  She comes into the emergency room is found to have a heart rate of 120-150.  Patient was given 2 doses of Cardizem and then placed on a Cardizem drip.  Patient will be admitted to the telemetry unit for telemetric monitoring.  Patient will be monitored with serial enzymes, heart rate management will be optimized, if the patient remains in atrial fibrillation with RVR patient is scheduled to have a ablation tomorrow morning.  Patient will be n.p.o. at midnight.    I discussed the plan of care with patient, bedside RN, pharmacy, and emergency room physician Dr. Silke Maddox.    Review of Systems  Review of Systems   Constitutional: Negative.  Negative for chills, diaphoresis and fever.   HENT: Negative.     Eyes: Negative.  Negative for double vision.   Respiratory: Negative.  Negative for cough, hemoptysis and wheezing.    Cardiovascular:  Positive for palpitations. Negative for chest pain and leg swelling.   Gastrointestinal: Negative.  Negative for abdominal pain, blood in stool, constipation, diarrhea, heartburn, nausea and vomiting.   Genitourinary: Negative.  Negative for frequency, hematuria and urgency.   Musculoskeletal: Negative.  Negative for joint pain.   Skin: Negative.  Negative for itching and  rash.   Neurological: Negative.  Negative for dizziness, focal weakness, seizures, loss of consciousness and headaches.   Endo/Heme/Allergies: Negative.  Does not bruise/bleed easily.   Psychiatric/Behavioral: Negative.  Negative for suicidal ideas. The patient is not nervous/anxious.    All other systems reviewed and are negative.      Past Medical History   has a past medical history of Acquired hypothyroidism (05/04/2020), CAD (coronary artery disease), Chronic diastolic heart failure ([facility]) (05/04/2020), Coronary artery disease due to lipid rich plaque, Dental disorder, Diabetes ([facility]), ESRD (end stage renal disease) on dialysis ([facility]) (05/04/2020), Hemodialysis patient ([facility]), Hyperlipidemia, Hypertension, Kidney transplant candidate, Kidney transplant recipient (10/31/2022), Presence of drug-eluting stent in right coronary artery, QT prolongation (01/22/2020), RLS (restless legs syndrome) (08/05/2016), and Transaminitis (12/22/2018).    Surgical History   has a past surgical history that includes recovery (08/16/2016); other abdominal surgery; other (Left, 2014); zzz cardiac cath (08/16/2016); zzz cardiac cath (09/07/2016); other abdominal surgery (Right, 10/31/2022); and ureteral reimplantation (08/07/2023).     Family History  Family History   Problem Relation Age of Onset    Diabetes Sister     Other Sister         liver disease    Diabetes Brother     Heart Disease Neg Hx         Family history reviewed with patient. There is no family history that is pertinent to the chief complaint.     Social History   reports that she has never smoked. She has never used smokeless tobacco. She reports that she does not drink alcohol and does not use drugs.    Allergies  Allergies[1]    Medications  Prior to Admission Medications   Prescriptions Last Dose Informant Patient Reported? Taking?   BD PEN NEEDLE PETE 2ND GEN  Patient's Home Pharmacy No No   Sig: USE AS DIRECTED WITH INSULIN PENS SIX TIMES DAILY   Continuous  Glucose  (FREESTYLE BALDOMERO 3 READER) Device  Patient's Home Pharmacy No No   Sig: Use 1 each continuously.   Continuous Glucose Sensor (FREESTYLE BALDOMERO 3 PLUS SENSOR) [facility] [place]  Patient's Home Pharmacy No No   Sig: Apply every 15 days   Continuous Glucose Sensor (FREESTYLE BALDOMERO 3 SENSOR) [facility] [place]  Patient's Home Pharmacy No No   Si Each every 14 days.   FARXIGA 5 MG Tab Unknown Patient's Home Pharmacy No No   Sig: Take 1 Tablet by mouth every day. Por diabetes   LANTUS SOLOSTAR 100 UNIT/ML Solution Pen-injector injection Unknown Patient's Home Pharmacy No No   Sig: INJECT 10 UNITS UNDER THE SKIN 2 TIMES A DAY. 5 PENS PER MONTH   Patient taking differently: Inject 20-30 Units under the skin 2 times a day. Pt takes 30 units AM and 20 units in the evening   Lancets  Patient's Home Pharmacy No No   Sig: Use one covered lancet to test blood sugar three times daily.   amLODIPine (NORVASC) 10 MG Tab New Rx Patient's Home Pharmacy Yes Yes   Sig: Take 10 mg by mouth every day. Per [facility] last [facility] 2023, there is a prescription there wait to be picked up (2025)   apixaban (ELIQUIS) 2.5mg Tab 2025 at 10:00 AM Patient's Home Pharmacy No Yes   Sig: Take 1 Tablet by mouth 2 times a day. TOME 1 TABLETA POR VIA ORAL DOS VECES AL DESMOND   atorvastatin (LIPITOR) 20 MG Tab 6/10/2025 Evening Patient's Home Pharmacy No Yes   Sig: TOME 1 TABLETA POR VIA ORAL TODOS LOS MCCORMICK EN LA NOCHE   furosemide (LASIX) 80 MG Tab Unknown Patient's Home Pharmacy No No   Sig: Take 1 Tablet by mouth every day.   glucose blood (ACCU-CHEK GUIDE) strip  Patient's Home Pharmacy No No   Sig: USE ONE COVERED STRIP TO TEST BLOOD SUGAR THREE TIMES DAILY.   insulin aspart (NOVOLOG) 100 UNIT/ML Solution Unknown Patient's Home Pharmacy Yes No   Sig: Inject 20 Units under the skin 3 times a day before meals. Per [facility] pt is to take 20 units TID with meals   No sliding scale or carb counting   levothyroxine (SYNTHROID) 88 MCG Tab Unknown Patient's Home  Pharmacy No No   Sig: Take 1 Tablet by mouth every morning on an empty stomach.   metoprolol SR (TOPROL XL) 25 MG TABLET SR 24 HR Unknown Patient's Home Pharmacy Yes No   Sig: Take 25 mg by mouth every day.   mycophenolate (CELLCEPT) 250 MG Cap Unknown Patient's Home Pharmacy Yes No   Sig: Take 250 mg by mouth 2 times a day.   predniSONE (DELTASONE) 5 MG Tab Unknown Patient's Home Pharmacy Yes No   Sig: Take 5 mg by mouth every day.   tacrolimus (PROGRAF) 1 MG Cap Unknown Patient's Home Pharmacy Yes No   Sig: Take 1 mg by mouth 2 times a day.   vitamin D2, Ergocalciferol, (DRISDOL) 1.25 MG (93963 UT) Cap capsule Not Taking Patient's Home Pharmacy No No   Sig: Take 1 Capsule by mouth every 7 days.   Patient not taking: Reported on 6/11/2025      Facility-Administered Medications: None       Physical Exam  Temp:  [36.4 °C (97.6 °F)] 36.4 °C (97.6 °F)  Pulse:  [] 124  Resp:  [13-20] 15  BP: (108-124)/(60-79) 108/60  SpO2:  [90 %-99 %] 98 %  Blood Pressure : 108/60   Temperature: 36.4 °C (97.6 °F)   Pulse: (!) 124   Respiration: 15   Pulse Oximetry: 98 %       Physical Exam  Vitals and nursing note reviewed. Exam conducted with a chaperone present.   Constitutional:       General: She is awake.      Appearance: Normal appearance. She is well-developed, well-groomed and normal weight. She is ill-appearing.   HENT:      Head: Normocephalic and atraumatic.      Jaw: There is normal jaw occlusion. No trismus.      Salivary Glands: Right salivary gland is not tender. Left salivary gland is not tender.      Right Ear: External ear normal.      Left Ear: External ear normal.      Nose: Nose normal.      Mouth/Throat:      Mouth: Mucous membranes are dry.      Pharynx: Oropharynx is clear.   Eyes:      General: Lids are normal. Vision grossly intact.      Extraocular Movements: Extraocular movements intact.      Conjunctiva/sclera: Conjunctivae normal.      Right eye: Right conjunctiva is not injected. No exudate.      Left eye: Left conjunctiva is not injected. No exudate.     Pupils: Pupils are equal, round, and reactive to light.   Neck:      Thyroid: No thyroid mass.      Vascular: No carotid bruit, hepatojugular reflux or JVD.      Trachea: No abnormal tracheal secretions or tracheal deviation.   Cardiovascular:      Rate and Rhythm: Tachycardia present. Rhythm irregular. Occasional Extrasystoles are present.     Pulses: Normal pulses.      Heart sounds: Normal heart sounds. No murmur heard.     No friction rub.   Pulmonary:      Effort: Pulmonary effort is normal.      Breath sounds: Decreased air movement present. Examination of the right-lower field reveals decreased breath sounds. Examination of the left-lower field reveals decreased breath sounds. Decreased breath sounds present. No wheezing or rhonchi.   Abdominal:      General: Abdomen is flat. Bowel sounds are normal.      Palpations: Abdomen is soft.      Tenderness: There is no abdominal tenderness. There is no right CVA tenderness or left CVA tenderness.      Hernia: No hernia is present.   Musculoskeletal:      Cervical back: Full passive range of motion without pain, normal range of motion and neck supple. No rigidity. No muscular tenderness.      Right lower leg: No edema.      Left lower leg: No edema.   Lymphadenopathy:      Head:      Right side of head: No submental adenopathy.      Left side of head: No submental adenopathy.      Cervical:      Right cervical: No superficial cervical adenopathy.     Left cervical: No superficial cervical adenopathy.      Upper Body:      Right upper body: No supraclavicular adenopathy.      Left upper body: No supraclavicular adenopathy.   Skin:     General: Skin is warm and dry.      Capillary Refill: Capillary refill takes 2 to 3 seconds.      Coloration: Skin is not cyanotic or pale.      Findings: No abrasion, bruising or rash.   Neurological:      General: No focal deficit present.      Mental Status: She is alert and  "oriented to person, place, and time. Mental status is at baseline.      GCS: GCS eye subscore is 4. GCS verbal subscore is 5. GCS motor subscore is 6.      Cranial Nerves: No cranial nerve deficit.      Sensory: No sensory deficit.      Motor: Motor function is intact.      Deep Tendon Reflexes:      Reflex Scores:       Tricep reflexes are 2+ on the right side and 2+ on the left side.       Bicep reflexes are 2+ on the right side and 2+ on the left side.       Brachioradialis reflexes are 2+ on the right side and 2+ on the left side.       Patellar reflexes are 2+ on the right side and 2+ on the left side.       Achilles reflexes are 2+ on the right side and 2+ on the left side.  Psychiatric:         Attention and Perception: Attention and perception normal.         Mood and Affect: Mood normal.         Speech: Speech normal.         Behavior: Behavior normal. Behavior is cooperative.         Thought Content: Thought content normal.         Cognition and Memory: Cognition and memory normal.         Judgment: Judgment normal.         Laboratory:  Recent Labs     06/11/25  1055   WBC 4.2*   RBC 5.01   HEMOGLOBIN 13.7   HEMATOCRIT 43.2   MCV 86.2   MCH 27.3   MCHC 31.7*   RDW 42.6   PLATELETCT 98*   MPV 11.6     Recent Labs     06/11/25  1055   SODIUM 133*   POTASSIUM 4.3   CHLORIDE 102   CO2 18*   GLUCOSE 228*   BUN 30*   CREATININE 1.40   CALCIUM 9.7     Recent Labs     06/11/25  1055   GLUCOSE 228*         No results for input(s): \"NTPROBNP\" in the last 72 hours.      Recent Labs     06/11/25  1055 06/11/25  1253 06/11/25  1437   TROPONINT 32* 32* 32*       Imaging:  DX-CHEST-PORTABLE (1 VIEW)   Final Result      Cardiomegaly.      EC-VELMA W/O CONT    (Results Pending)   CL-CARDIOVERSION    (Results Pending)   EC-ECHOCARDIOGRAM COMPLETE W/O CONT    (Results Pending)       X-Ray:  I have personally reviewed the images and compared with prior images.  EKG:  I have personally reviewed the images and compared with prior " images.    Assessment/Plan:  Justification for Admission Status  I anticipate this patient is appropriate for observation status at this time because patient has acute atrial fibrillation with rapid ventricular response will require overnight stay.    Patient will need a Telemetry bed on MEDICAL service .  The need is secondary to patient with rapid ventricular response.    * Atrial fibrillation with rapid ventricular response (HCC)- (present on admission)  Assessment & Plan  Patient was scheduled for an outpatient stress test and with this the patient's metoprolol was held for 3 days.  Patient now has gone into atrial fibrillation with rapid ventricular response.  Cardiology at this point recommends Cardizem drip at 5 mg/h and if this is not successful n.p.o. at midnight and then the patient is supposed to have ablation tomorrow morning.  Resume previous oral beta-blocker    Immunosuppressive management encounter following kidney transplant- (present on admission)  Assessment & Plan  Continue tacrolimus, CellCept, prednisone    Chronic heart failure with preserved ejection fraction (HCC)- (present on admission)  Assessment & Plan  Most recent left ventricular ejection fraction is 65%  Continue at this point with metoprolol    Coronary artery disease with angina pectoris with documented spasm (HCC)- (present on admission)  Assessment & Plan  Patient has 2 stents in place to the RCA  Continue with metoprolol, Lipitor, Eliquis    Type 2 diabetes mellitus with stage 4 chronic kidney disease, with long-term current use of insulin (HCC)- (present on admission)  Assessment & Plan  -accus with sliding scale coverage, continue Lantus and 30 units in the morning and 20 units at night  -diabetic diet  -diabetic education  -follow glycohemoglobin levels long term, very poorly controlled most recent hemoglobin A1c is 11.4  -monitor for hypoglycemic episodes and adjust control if he should get low    Primary hypertension-  (present on admission)  Assessment & Plan  I will optimize blood pressure management keep systolic blood pressure less than 140 diastolic under 90  Resume Norvasc 10 mg daily, metoprolol XL 25 mg daily as needed hydralazine    GERD (gastroesophageal reflux disease)- (present on admission)  Assessment & Plan  Currently denies any heartburn    Hypothyroidism, acquired- (present on admission)  Assessment & Plan  Continue Synthroid 88 mcg daily  Most recent TSH 0.625 from today    Mixed hyperlipidemia- (present on admission)  Assessment & Plan  Low-fat low-cholesterol diet  Continue with Lipitor 20 mg nightly  Fasting lipid panel        VTE prophylaxis: SCDs/TEDs and therapeutic anticoagulation with Eliquis       [1] No Known Allergies

## 2025-06-12 ENCOUNTER — APPOINTMENT (OUTPATIENT)
Dept: CARDIOLOGY | Facility: MEDICAL CENTER | Age: 75
DRG: 309 | End: 2025-06-12
Attending: NURSE PRACTITIONER
Payer: MEDICARE

## 2025-06-12 ENCOUNTER — ANESTHESIA (OUTPATIENT)
Dept: SURGERY | Facility: MEDICAL CENTER | Age: 75
DRG: 309 | End: 2025-06-12
Payer: MEDICARE

## 2025-06-12 ENCOUNTER — ANESTHESIA EVENT (OUTPATIENT)
Dept: SURGERY | Facility: MEDICAL CENTER | Age: 75
DRG: 309 | End: 2025-06-12
Payer: MEDICARE

## 2025-06-12 ENCOUNTER — TELEPHONE (OUTPATIENT)
Dept: CARDIOLOGY | Facility: MEDICAL CENTER | Age: 75
End: 2025-06-12
Payer: MEDICARE

## 2025-06-12 DIAGNOSIS — Z79.4 TYPE 2 DIABETES MELLITUS WITH STAGE 3B CHRONIC KIDNEY DISEASE, WITH LONG-TERM CURRENT USE OF INSULIN (HCC): Chronic | ICD-10-CM

## 2025-06-12 DIAGNOSIS — E11.22 TYPE 2 DIABETES MELLITUS WITH STAGE 3B CHRONIC KIDNEY DISEASE, WITH LONG-TERM CURRENT USE OF INSULIN (HCC): Chronic | ICD-10-CM

## 2025-06-12 DIAGNOSIS — N18.32 TYPE 2 DIABETES MELLITUS WITH STAGE 3B CHRONIC KIDNEY DISEASE, WITH LONG-TERM CURRENT USE OF INSULIN (HCC): Chronic | ICD-10-CM

## 2025-06-12 LAB
ANION GAP SERPL CALC-SCNC: 13 MMOL/L (ref 7–16)
BUN SERPL-MCNC: 36 MG/DL (ref 8–22)
CALCIUM SERPL-MCNC: 9.5 MG/DL (ref 8.5–10.5)
CHLORIDE SERPL-SCNC: 103 MMOL/L (ref 96–112)
CO2 SERPL-SCNC: 17 MMOL/L (ref 20–33)
CREAT SERPL-MCNC: 1.68 MG/DL (ref 0.5–1.4)
EKG IMPRESSION: NORMAL
ERYTHROCYTE [DISTWIDTH] IN BLOOD BY AUTOMATED COUNT: 42.8 FL (ref 35.9–50)
GFR SERPLBLD CREATININE-BSD FMLA CKD-EPI: 31 ML/MIN/1.73 M 2
GLUCOSE BLD STRIP.AUTO-MCNC: 109 MG/DL (ref 65–99)
GLUCOSE BLD STRIP.AUTO-MCNC: 217 MG/DL (ref 65–99)
GLUCOSE BLD STRIP.AUTO-MCNC: 224 MG/DL (ref 65–99)
GLUCOSE BLD STRIP.AUTO-MCNC: 346 MG/DL (ref 65–99)
GLUCOSE SERPL-MCNC: 284 MG/DL (ref 65–99)
HCT VFR BLD AUTO: 39.5 % (ref 37–47)
HGB BLD-MCNC: 12.5 G/DL (ref 12–16)
MCH RBC QN AUTO: 27.5 PG (ref 27–33)
MCHC RBC AUTO-ENTMCNC: 31.6 G/DL (ref 32.2–35.5)
MCV RBC AUTO: 87 FL (ref 81.4–97.8)
PLATELET # BLD AUTO: 112 K/UL (ref 164–446)
PMV BLD AUTO: 12 FL (ref 9–12.9)
POTASSIUM SERPL-SCNC: 5 MMOL/L (ref 3.6–5.5)
RBC # BLD AUTO: 4.54 M/UL (ref 4.2–5.4)
SODIUM SERPL-SCNC: 133 MMOL/L (ref 135–145)
TROPONIN T SERPL-MCNC: 31 NG/L (ref 6–19)
WBC # BLD AUTO: 4.2 K/UL (ref 4.8–10.8)

## 2025-06-12 PROCEDURE — 85027 COMPLETE CBC AUTOMATED: CPT

## 2025-06-12 PROCEDURE — 700102 HCHG RX REV CODE 250 W/ 637 OVERRIDE(OP): Performed by: NURSE PRACTITIONER

## 2025-06-12 PROCEDURE — 700111 HCHG RX REV CODE 636 W/ 250 OVERRIDE (IP): Performed by: HOSPITALIST

## 2025-06-12 PROCEDURE — 93005 ELECTROCARDIOGRAM TRACING: CPT | Mod: TC | Performed by: HOSPITALIST

## 2025-06-12 PROCEDURE — 700101 HCHG RX REV CODE 250: Performed by: STUDENT IN AN ORGANIZED HEALTH CARE EDUCATION/TRAINING PROGRAM

## 2025-06-12 PROCEDURE — 36415 COLL VENOUS BLD VENIPUNCTURE: CPT

## 2025-06-12 PROCEDURE — 94760 N-INVAS EAR/PLS OXIMETRY 1: CPT

## 2025-06-12 PROCEDURE — 700102 HCHG RX REV CODE 250 W/ 637 OVERRIDE(OP): Performed by: STUDENT IN AN ORGANIZED HEALTH CARE EDUCATION/TRAINING PROGRAM

## 2025-06-12 PROCEDURE — 93325 DOPPLER ECHO COLOR FLOW MAPG: CPT

## 2025-06-12 PROCEDURE — 700105 HCHG RX REV CODE 258: Performed by: STUDENT IN AN ORGANIZED HEALTH CARE EDUCATION/TRAINING PROGRAM

## 2025-06-12 PROCEDURE — 110305 HCHG STAT TEE: Performed by: STUDENT IN AN ORGANIZED HEALTH CARE EDUCATION/TRAINING PROGRAM

## 2025-06-12 PROCEDURE — 700111 HCHG RX REV CODE 636 W/ 250 OVERRIDE (IP): Performed by: STUDENT IN AN ORGANIZED HEALTH CARE EDUCATION/TRAINING PROGRAM

## 2025-06-12 PROCEDURE — 700102 HCHG RX REV CODE 250 W/ 637 OVERRIDE(OP): Performed by: HOSPITALIST

## 2025-06-12 PROCEDURE — A9270 NON-COVERED ITEM OR SERVICE: HCPCS | Performed by: NURSE PRACTITIONER

## 2025-06-12 PROCEDURE — 92960 CARDIOVERSION ELECTRIC EXT: CPT | Performed by: STUDENT IN AN ORGANIZED HEALTH CARE EDUCATION/TRAINING PROGRAM

## 2025-06-12 PROCEDURE — 80048 BASIC METABOLIC PNL TOTAL CA: CPT

## 2025-06-12 PROCEDURE — 93010 ELECTROCARDIOGRAM REPORT: CPT | Performed by: INTERNAL MEDICINE

## 2025-06-12 PROCEDURE — 160002 HCHG RECOVERY MINUTES (STAT): Performed by: STUDENT IN AN ORGANIZED HEALTH CARE EDUCATION/TRAINING PROGRAM

## 2025-06-12 PROCEDURE — 770020 HCHG ROOM/CARE - TELE (206)

## 2025-06-12 PROCEDURE — 5A2204Z RESTORATION OF CARDIAC RHYTHM, SINGLE: ICD-10-PCS | Performed by: STUDENT IN AN ORGANIZED HEALTH CARE EDUCATION/TRAINING PROGRAM

## 2025-06-12 PROCEDURE — 700105 HCHG RX REV CODE 258: Performed by: HOSPITALIST

## 2025-06-12 PROCEDURE — 96366 THER/PROPH/DIAG IV INF ADDON: CPT

## 2025-06-12 PROCEDURE — 160197 HCHG MAC ANESTHESIA: Performed by: STUDENT IN AN ORGANIZED HEALTH CARE EDUCATION/TRAINING PROGRAM

## 2025-06-12 PROCEDURE — 160193 HCHG PACU STANDARD - 1ST 60 MINS: Performed by: STUDENT IN AN ORGANIZED HEALTH CARE EDUCATION/TRAINING PROGRAM

## 2025-06-12 PROCEDURE — A9270 NON-COVERED ITEM OR SERVICE: HCPCS | Performed by: STUDENT IN AN ORGANIZED HEALTH CARE EDUCATION/TRAINING PROGRAM

## 2025-06-12 PROCEDURE — A9270 NON-COVERED ITEM OR SERVICE: HCPCS | Performed by: HOSPITALIST

## 2025-06-12 PROCEDURE — 96372 THER/PROPH/DIAG INJ SC/IM: CPT

## 2025-06-12 PROCEDURE — 160048 HCHG OR STATISTICAL LEVEL 1-5: Performed by: STUDENT IN AN ORGANIZED HEALTH CARE EDUCATION/TRAINING PROGRAM

## 2025-06-12 PROCEDURE — 84484 ASSAY OF TROPONIN QUANT: CPT

## 2025-06-12 PROCEDURE — 160015 HCHG STAT PREOP MINUTES: Performed by: STUDENT IN AN ORGANIZED HEALTH CARE EDUCATION/TRAINING PROGRAM

## 2025-06-12 PROCEDURE — 99232 SBSQ HOSP IP/OBS MODERATE 35: CPT | Mod: 25,FS | Performed by: STUDENT IN AN ORGANIZED HEALTH CARE EDUCATION/TRAINING PROGRAM

## 2025-06-12 PROCEDURE — 99233 SBSQ HOSP IP/OBS HIGH 50: CPT | Performed by: INTERNAL MEDICINE

## 2025-06-12 PROCEDURE — 82962 GLUCOSE BLOOD TEST: CPT | Performed by: HOSPITALIST

## 2025-06-12 PROCEDURE — 82962 GLUCOSE BLOOD TEST: CPT | Performed by: INTERNAL MEDICINE

## 2025-06-12 RX ORDER — HALOPERIDOL 5 MG/ML
1 INJECTION INTRAMUSCULAR
Status: DISCONTINUED | OUTPATIENT
Start: 2025-06-12 | End: 2025-06-12 | Stop reason: HOSPADM

## 2025-06-12 RX ORDER — METOPROLOL SUCCINATE 25 MG/1
25 TABLET, EXTENDED RELEASE ORAL DAILY
Qty: 90 TABLET | Refills: 3 | Status: SHIPPED | OUTPATIENT
Start: 2025-06-12

## 2025-06-12 RX ORDER — LIDOCAINE HYDROCHLORIDE 20 MG/ML
INJECTION, SOLUTION EPIDURAL; INFILTRATION; INTRACAUDAL; PERINEURAL PRN
Status: DISCONTINUED | OUTPATIENT
Start: 2025-06-12 | End: 2025-06-12 | Stop reason: SURG

## 2025-06-12 RX ORDER — ONDANSETRON 2 MG/ML
INJECTION INTRAMUSCULAR; INTRAVENOUS PRN
Status: DISCONTINUED | OUTPATIENT
Start: 2025-06-12 | End: 2025-06-12 | Stop reason: SURG

## 2025-06-12 RX ORDER — ONDANSETRON 2 MG/ML
4 INJECTION INTRAMUSCULAR; INTRAVENOUS
Status: DISCONTINUED | OUTPATIENT
Start: 2025-06-12 | End: 2025-06-12 | Stop reason: HOSPADM

## 2025-06-12 RX ORDER — LABETALOL HYDROCHLORIDE 5 MG/ML
5 INJECTION, SOLUTION INTRAVENOUS
Status: DISCONTINUED | OUTPATIENT
Start: 2025-06-12 | End: 2025-06-12 | Stop reason: HOSPADM

## 2025-06-12 RX ORDER — EPHEDRINE SULFATE 50 MG/ML
5 INJECTION, SOLUTION INTRAVENOUS
Status: DISCONTINUED | OUTPATIENT
Start: 2025-06-12 | End: 2025-06-12 | Stop reason: HOSPADM

## 2025-06-12 RX ORDER — AMIODARONE HYDROCHLORIDE 200 MG/1
400 TABLET ORAL TWICE DAILY
Status: DISCONTINUED | OUTPATIENT
Start: 2025-06-12 | End: 2025-06-15 | Stop reason: HOSPADM

## 2025-06-12 RX ORDER — ALBUTEROL SULFATE 5 MG/ML
2.5 SOLUTION RESPIRATORY (INHALATION)
Status: DISCONTINUED | OUTPATIENT
Start: 2025-06-12 | End: 2025-06-12 | Stop reason: HOSPADM

## 2025-06-12 RX ORDER — SODIUM CHLORIDE, SODIUM LACTATE, POTASSIUM CHLORIDE, CALCIUM CHLORIDE 600; 310; 30; 20 MG/100ML; MG/100ML; MG/100ML; MG/100ML
INJECTION, SOLUTION INTRAVENOUS CONTINUOUS
Status: DISCONTINUED | OUTPATIENT
Start: 2025-06-12 | End: 2025-06-12

## 2025-06-12 RX ORDER — DAPAGLIFLOZIN 5 MG/1
TABLET, FILM COATED ORAL
Qty: 90 TABLET | Refills: 3 | Status: SHIPPED | OUTPATIENT
Start: 2025-06-12

## 2025-06-12 RX ORDER — DEXAMETHASONE SODIUM PHOSPHATE 4 MG/ML
INJECTION, SOLUTION INTRA-ARTICULAR; INTRALESIONAL; INTRAMUSCULAR; INTRAVENOUS; SOFT TISSUE PRN
Status: DISCONTINUED | OUTPATIENT
Start: 2025-06-12 | End: 2025-06-12 | Stop reason: SURG

## 2025-06-12 RX ORDER — DIPHENHYDRAMINE HYDROCHLORIDE 50 MG/ML
12.5 INJECTION, SOLUTION INTRAMUSCULAR; INTRAVENOUS
Status: DISCONTINUED | OUTPATIENT
Start: 2025-06-12 | End: 2025-06-12 | Stop reason: HOSPADM

## 2025-06-12 RX ORDER — TRAMADOL HYDROCHLORIDE 50 MG/1
50 TABLET ORAL EVERY 6 HOURS PRN
Status: DISCONTINUED | OUTPATIENT
Start: 2025-06-12 | End: 2025-06-15 | Stop reason: HOSPADM

## 2025-06-12 RX ADMIN — ATORVASTATIN CALCIUM 20 MG: 20 TABLET, FILM COATED ORAL at 18:01

## 2025-06-12 RX ADMIN — APIXABAN 2.5 MG: 2.5 TABLET, FILM COATED ORAL at 18:01

## 2025-06-12 RX ADMIN — SODIUM CHLORIDE, POTASSIUM CHLORIDE, SODIUM LACTATE AND CALCIUM CHLORIDE: 600; 310; 30; 20 INJECTION, SOLUTION INTRAVENOUS at 15:54

## 2025-06-12 RX ADMIN — DEXAMETHASONE SODIUM PHOSPHATE 4 MG: 4 INJECTION INTRA-ARTICULAR; INTRALESIONAL; INTRAMUSCULAR; INTRAVENOUS; SOFT TISSUE at 16:01

## 2025-06-12 RX ADMIN — PROPOFOL 30 MG: 10 INJECTION, EMULSION INTRAVENOUS at 16:01

## 2025-06-12 RX ADMIN — LIDOCAINE HYDROCHLORIDE 50 MG: 20 INJECTION, SOLUTION EPIDURAL; INFILTRATION; INTRACAUDAL; PERINEURAL at 16:00

## 2025-06-12 RX ADMIN — ONDANSETRON 4 MG: 2 INJECTION INTRAMUSCULAR; INTRAVENOUS at 16:01

## 2025-06-12 RX ADMIN — FUROSEMIDE 80 MG: 40 TABLET ORAL at 05:35

## 2025-06-12 RX ADMIN — DAPAGLIFLOZIN 5 MG: 10 TABLET, FILM COATED ORAL at 05:35

## 2025-06-12 RX ADMIN — MYCOPHENOLATE MOFETIL 250 MG: 250 CAPSULE ORAL at 05:36

## 2025-06-12 RX ADMIN — LEVOTHYROXINE SODIUM 88 MCG: 0.09 TABLET ORAL at 05:36

## 2025-06-12 RX ADMIN — METOPROLOL SUCCINATE 25 MG: 25 TABLET, EXTENDED RELEASE ORAL at 05:35

## 2025-06-12 RX ADMIN — PROPOFOL 30 MG: 10 INJECTION, EMULSION INTRAVENOUS at 16:14

## 2025-06-12 RX ADMIN — DILTIAZEM HYDROCHLORIDE 10 MG/HR: 5 INJECTION INTRAVENOUS at 03:13

## 2025-06-12 RX ADMIN — INSULIN LISPRO 4 UNITS: 100 INJECTION, SOLUTION INTRAVENOUS; SUBCUTANEOUS at 20:26

## 2025-06-12 RX ADMIN — INSULIN LISPRO 2 UNITS: 100 INJECTION, SOLUTION INTRAVENOUS; SUBCUTANEOUS at 05:53

## 2025-06-12 RX ADMIN — TRAZODONE HYDROCHLORIDE 50 MG: 50 TABLET ORAL at 00:22

## 2025-06-12 RX ADMIN — TACROLIMUS 1 MG: 1 CAPSULE ORAL at 05:36

## 2025-06-12 RX ADMIN — SENNOSIDES AND DOCUSATE SODIUM 2 TABLET: 50; 8.6 TABLET ORAL at 18:02

## 2025-06-12 RX ADMIN — MYCOPHENOLATE MOFETIL 250 MG: 250 CAPSULE ORAL at 18:01

## 2025-06-12 RX ADMIN — TACROLIMUS 1 MG: 1 CAPSULE ORAL at 18:00

## 2025-06-12 RX ADMIN — ACETAMINOPHEN 650 MG: 325 TABLET ORAL at 20:18

## 2025-06-12 RX ADMIN — AMLODIPINE BESYLATE 10 MG: 5 TABLET ORAL at 05:35

## 2025-06-12 RX ADMIN — APIXABAN 2.5 MG: 2.5 TABLET, FILM COATED ORAL at 05:35

## 2025-06-12 RX ADMIN — PREDNISONE 5 MG: 5 TABLET ORAL at 05:36

## 2025-06-12 RX ADMIN — AMIODARONE HYDROCHLORIDE 400 MG: 200 TABLET ORAL at 18:01

## 2025-06-12 RX ADMIN — PROPOFOL 75 MCG/KG/MIN: 10 INJECTION, EMULSION INTRAVENOUS at 16:00

## 2025-06-12 ASSESSMENT — ENCOUNTER SYMPTOMS
NAUSEA: 0
MYALGIAS: 0
HEADACHES: 0
CHILLS: 0
WEAKNESS: 0
BLOOD IN STOOL: 0
SORE THROAT: 0
PALPITATIONS: 0
DIZZINESS: 0
ABDOMINAL PAIN: 0
FOCAL WEAKNESS: 0
BACK PAIN: 0
DIARRHEA: 0
FEVER: 0
DEPRESSION: 0
COUGH: 0
VOMITING: 0
SHORTNESS OF BREATH: 0
HALLUCINATIONS: 0
HEARTBURN: 0

## 2025-06-12 ASSESSMENT — PAIN DESCRIPTION - PAIN TYPE
TYPE: SURGICAL PAIN
TYPE: SURGICAL PAIN
TYPE: ACUTE PAIN
TYPE: ACUTE PAIN
TYPE: SURGICAL PAIN

## 2025-06-12 ASSESSMENT — PAIN SCALES - GENERAL: PAIN_LEVEL: 0

## 2025-06-12 NOTE — ANESTHESIA POSTPROCEDURE EVALUATION
Patient: Tere Gallegos    Procedure Summary       Date: 06/12/25 Room / Location:  ENDOSCOPIC ULTRASOUND ROOM / SURGERY [facility]    Anesthesia Start: 1554 Anesthesia Stop: 1630    Procedures:       CARDIOVERSION (Chest)      ECHOCARDIOGRAM, TRANSESOPHAGEAL (Chest) Diagnosis: (Atrial fibrillation with RVR)    Surgeons: Milton Pettit M.D. Responsible Provider: Ritesh Barbosa M.D.    Anesthesia Type: MAC ASA Status: 4 - Emergent            Final Anesthesia Type: MAC  Last vitals  BP   Blood Pressure : 106/45, NIBP: 127/68    Temp   36 °C (96.8 °F)    Pulse   (!) 52   Resp   15    SpO2   97 %      Anesthesia Post Evaluation    Patient location during evaluation: PACU  Patient participation: complete - patient participated  Level of consciousness: awake and alert  Pain score: 0    Airway patency: patent  Anesthetic complications: no  Cardiovascular status: hemodynamically stable  Respiratory status: acceptable  Hydration status: euvolemic    PONV: none          There were no known notable events for this encounter.     Nurse Pain Score: 0 (NPRS)

## 2025-06-12 NOTE — ANESTHESIA TIME REPORT
Anesthesia Start and Stop Event Times       Date Time Event    6/12/2025 1316 Ready for Procedure     1554 Anesthesia Start     1630 Anesthesia Stop          Responsible Staff  06/12/25      Name Role Begin End    Ritesh Barbosa M.D. Anesth 1554 1630          Overtime Reason:  no overtime (within assigned shift)    Comments:                                                           Suturegard Intro: Intraoperative tissue expansion was performed, utilizing the SUTUREGARD device, in order to reduce wound tension.

## 2025-06-12 NOTE — PROGRESS NOTES
Cardiology Follow-up Progress Note    Date of Service  6/12/2025    Referring Physician  Vicki Maddox MD    Reason for Consultation  Atrial fibrillation with RVR     services were used in the patient's primary language of [place].    History of Presenting Illness  Tere Gallegos is a 74 y.o. female with a past medical history of paroxysmal A-fib, CAD with  RCA with left-to-right collateral; NOEMI ×2 to RCA on 9/7/16, HFpEF, renal transplant on immunosuppression therapy, CKD 3a, T2DM who presented 6/11/2025 with atrial fibrillation with rapid ventricular rate noted prior to undergoing outpatient stress testing and was referred to ER.    Patient reports she has had chest pain for the last 3 weeks and has discussed this with her primary cardiologist, Dr. Crawley who she saw on 5/14/2025 who ordered stress testing as well as cardiac event monitoring.  Otherwise, patient feels well, denies shortness of breath, palpitations, dizziness/lightheadedness, orthopnea, PND or Edema.  Patient denies any other medication changes besides missing Eliquis.    Patient does have elevated troponins at baseline.     EKG during her last cardiology visit did show sinus tachycardia.  Patient reports she did not take her Eliquis for the last 3 days and she resumed it this morning.    Patient is well-known to cardiology office where they have discussed ablation with EP in the past.  No decision was made at that time to pursue ablation.      Interval Update  6/12/2025: No overnight cardiac events. Tele monitoring personally interpreted by me shows Aflutter 60's. VSS; RA. Labs reviewed, notable for K 5.0, kidney function appears baseline, T4 1.75. NPO for VELMA/DCCV. Decrease dilt gtt to 5, transition to PO antiarrhythmic therapy after VELMA. OP FU with EP.   Disposition: discharge likely tomorrow pending cardioversion outcome    Future Appointments   Date Time Provider Department Center   6/25/2025 11:20 AM Kathy Melgar  ANGELITA LYONS Suzi Rose   7/7/2025  3:15 PM ANGELITA Cannon None   7/16/2025 10:00 AM [place] [facility] [place]ASSON Vinson   7/23/2025 11:20 AM ANGELITA Concepcion Suzi Rose   8/15/2025  2:45 PM ANGELITA Ortega None   8/20/2025 11:20 AM ANGELITA Concepcion Suzi Luzmaria       Review of Systems  Review of Systems Complete review of systems negative except as noted in HPI/subjective      Past Medical History   has a past medical history of Acquired hypothyroidism (05/04/2020), CAD (coronary artery disease), Chronic diastolic heart failure (HCC) (05/04/2020), Coronary artery disease due to lipid rich plaque, Dental disorder, Diabetes ([facility]), ESRD (end stage renal disease) on dialysis ([facility]) (05/04/2020), Hemodialysis patient ([facility]), Hyperlipidemia, Hypertension, Kidney transplant candidate, Kidney transplant recipient (10/31/2022), Presence of drug-eluting stent in right coronary artery, QT prolongation (01/22/2020), RLS (restless legs syndrome) (08/05/2016), and Transaminitis (12/22/2018).    Surgical History   has a past surgical history that includes recovery (08/16/2016); other abdominal surgery; other (Left, 2014); zzz cardiac cath (08/16/2016); zzz cardiac cath (09/07/2016); other abdominal surgery (Right, 10/31/2022); and ureteral reimplantation (08/07/2023).    Family History  Family History   Problem Relation Age of Onset    Diabetes Sister     Other Sister         liver disease    Diabetes Brother     Heart Disease Neg Hx        Social History   reports that she has never smoked. She has never used smokeless tobacco. She reports that she does not drink alcohol and does not use drugs.    Medications  Prior to Admission Medications   Prescriptions Last Dose Informant Patient Reported? Taking?   BD PEN NEEDLE PETE 2ND GEN  Patient's Home Pharmacy No No   Sig: USE AS DIRECTED WITH INSULIN PENS SIX TIMES DAILY   Continuous Glucose   (FREESTYLE BALDOMERO 3 READER) Device  Patient's Home Pharmacy No No   Sig: Use 1 each continuously.   Continuous Glucose Sensor (FREESTYLE BALDOMERO 3 PLUS SENSOR[place] [facility] [place] No No   Sig: Apply every 15 days   Continuous Glucose Sensor (FREESTYLE BALDOMERO 3 SENSOR[place] [facility] [place]'s [place] Pharmacy No No   Si Each every 14 days.   FARXIGA 5 MG Tab Unknown Patient's Home Pharmacy No No   Sig: Take 1 Tablet by mouth every day. Por diabetes   LANTUS SOLOSTAR 100 UNIT/ML Solution Pen-injector injection Unknown Patient's Home Pharmacy No No   Sig: INJECT 10 UNITS UNDER THE SKIN 2 TIMES A DAY. 5 PENS PER MONTH   Patient taking differently: Inject 20-30 Units under the skin 2 times a day. Pt takes 30 units AM and 20 units in the evening   Lancets  Patient's Home Pharmacy No No   Sig: Use one covered lancet to test blood sugar three times daily.   amLODIPine (NORVASC) 10 MG Tab New Rx Patient's Home Pharmacy Yes Yes   Sig: Take 10 mg by mouth every day. Per [facility] last filled 2023, there is a prescription there wait to be picked up (2025)   apixaban (ELIQUIS) 2.5mg Tab 2025 at 10:00 AM Patient's Home Pharmacy No Yes   Sig: Take 1 Tablet by mouth 2 times a day. TOME 1 TABLETA POR VIA ORAL DOS VECES AL DESMOND   atorvastatin (LIPITOR) 20 MG Tab 6/10/2025 Evening Patient's Home Pharmacy No Yes   Sig: TOME 1 TABLETA POR VIA ORAL TODOS LOS MCCORMICK EN LA NOCHE   furosemide (LASIX) 80 MG Tab Unknown Patient's Home Pharmacy No No   Sig: Take 1 Tablet by mouth every day.   glucose blood (ACCU-CHEK GUIDE) strip  Patient's Home Pharmacy No No   Sig: USE ONE COVERED STRIP TO TEST BLOOD SUGAR THREE TIMES DAILY.   insulin aspart (NOVOLOG) 100 UNIT/ML Solution Unknown Patient's Home Pharmacy Yes No   Sig: Inject 20 Units under the skin 3 times a day before meals. Per [facility] pt is to take 20 units TID with meals   No sliding scale or carb counting   levothyroxine (SYNTHROID) 88 MCG Tab Unknown Patient's Home Pharmacy No No   Sig:  Take 1 Tablet by mouth every morning on an empty stomach.   metoprolol SR (TOPROL XL) 25 MG TABLET SR 24 HR Unknown Patient's Home Pharmacy Yes No   Sig: Take 25 mg by mouth every day.   mycophenolate (CELLCEPT) 250 MG Cap Unknown Patient's Home Pharmacy Yes No   Sig: Take 250 mg by mouth 2 times a day.   predniSONE (DELTASONE) 5 MG Tab Unknown Patient's Home Pharmacy Yes No   Sig: Take 5 mg by mouth every day.   tacrolimus (PROGRAF) 1 MG Cap Unknown Patient's Home Pharmacy Yes No   Sig: Take 1 mg by mouth 2 times a day.   vitamin D2, Ergocalciferol, (DRISDOL) 1.25 MG (92852 UT) Cap capsule Not Taking Patient's Home Pharmacy No No   Sig: Take 1 Capsule by mouth every 7 days.   Patient not taking: Reported on 6/11/2025      Facility-Administered Medications: None       Allergies  Allergies[1]    Vital signs in last 24 hours  Temp:  [36.3 °C (97.4 °F)-36.9 °C (98.4 °F)] 36.9 °C (98.4 °F)  Pulse:  [] 63  Resp:  [13-26] 18  BP: (108-131)/(60-79) 125/61  SpO2:  [90 %-99 %] 93 %    Physical Exam  Physical Exam  Vitals reviewed.   Constitutional:       General: She is not in acute distress.     Appearance: Normal appearance.   HENT:      Head: Normocephalic and atraumatic.   Eyes:      Extraocular Movements: Extraocular movements intact.      Conjunctiva/sclera: Conjunctivae normal.   Cardiovascular:      Rate and Rhythm: Tachycardia present. Rhythm regularly irregular.      Pulses: Normal pulses.      Heart sounds: Normal heart sounds.   Pulmonary:      Effort: Pulmonary effort is normal.      Breath sounds: Normal breath sounds.   Musculoskeletal:      Right lower leg: No edema.      Left lower leg: No edema.   Skin:     General: Skin is warm and dry.      Findings: No rash.   Neurological:      Mental Status: She is alert.         Lab Review  Lab Results   Component Value Date/Time    WBC 4.2 (L) 06/12/2025 02:48 AM    RBC 4.54 06/12/2025 02:48 AM    HEMOGLOBIN 12.5 06/12/2025 02:48 AM    HEMATOCRIT 39.5  "06/12/2025 02:48 AM    MCV 87.0 06/12/2025 02:48 AM    MCH 27.5 06/12/2025 02:48 AM    MCHC 31.6 (L) 06/12/2025 02:48 AM    MPV 12.0 06/12/2025 02:48 AM      Lab Results   Component Value Date/Time    SODIUM 133 (L) 06/12/2025 02:48 AM    POTASSIUM 5.0 06/12/2025 02:48 AM    CHLORIDE 103 06/12/2025 02:48 AM    CO2 17 (L) 06/12/2025 02:48 AM    GLUCOSE 284 (H) 06/12/2025 02:48 AM    BUN 36 (H) 06/12/2025 02:48 AM    CREATININE 1.68 (H) 06/12/2025 02:48 AM    BUNCREATRAT 8 (L) 01/28/2021 07:00 AM      Lab Results   Component Value Date/Time    ASTSGOT 18 04/05/2025 08:21 AM    ALTSGPT 22 04/05/2025 08:21 AM     Lab Results   Component Value Date/Time    CHOLSTRLTOT 134 04/05/2025 08:21 AM    LDL 47 04/05/2025 08:21 AM    HDL 35 (A) 04/05/2025 08:21 AM    TRIGLYCERIDE 259 (H) 04/05/2025 08:21 AM    TROPONINT 31 (H) 06/12/2025 02:48 AM       No results for input(s): \"NTPROBNP\" in the last 72 hours.    Cardiac Imaging and Procedures Review  EKG:  My personal interpretation of the EKG dated 6/11/2025 is atrial fibrillation with ventricular rate 134    Echocardiogram (4/3/2023):  Normal left ventricular size, thickness, systolic function, and   diastolic function.  Mild mitral regurgitation.  Normal inferior vena cava size and inspiratory collapse.  Estimated right ventricular systolic pressure is 40 mmHg.    Cardiac Catheterization (6/8/2021):    POSTOPERATIVE DIAGNOSIS:  1.  Nonobstructive coronary artery disease.  2.  Widely patent previously placed RCA stents  3.  LVEF 70%, LVEDP 22 mmHg  FINDINGS:  I. HEMODYNAMICS:               Ao: 110/54 mmHg              LEDP: 22 mmHg              Gradient on LV pullback: No     II. LEFT VENTRICULOGRAM:              LVEF JOHNSON PROJECTION: 70%                III. CORONARY ANGIOGRAPHY:  Left Main: Large caliber bifurcating no CAD.  Left Anterior Descending: Moderate to large caliber vessel with usual complement of diagonals.  Mild nonobstructive CAD.  Left Circumflex: Large caliber " Co-dominant supplying multiple large tortuous obtuse marginals with mild nonobstructive CAD.  Right Coronary: Small caliber supplying a moderate-sized RPDA.  This is a codominant fashion.  There is a widely patent long segment of previously placed stents in the midportion.    NM-Stress (12/2022): Normal Lexiscan myocardial perfusion study.   No evidence of ischemia or infarct.   SDS 0.   LVEF 70%.   No ischemic changes with Regadenoson.   No arrhythmias.   No chest pain.   ECG INTERPRETATION   Negative stress ECG for ischemia.    TTE (6/11/2025):  Normal left ventricular chamber size.  Normal left ventricular chamber size.  The left ventricular ejection fraction is visually estimated to be 60%.  Dilated inferior vena cava with inspiratory collapse.    Imaging  Chest X-Ray (6/11/2025):    There is no evidence of focal consolidation or evidence of pulmonary edema.  There is no pleural effusion.  The heart is enlarged.  Postsurgical changes are again seen.     Assessment/Plan  #Atrial fibrillation/flutter with RVR  #CAD s/p PCI to RCA (2016)  #HFpEF  #Hypertension  #Hyperlipidemia  #Hypothyroid  #End-stage renal disease s/p kidney transplant (2022), immunosuppression therapy  #CKD 3A  - Last DCCV in 2023.  Previously seen by EP in 2023. Patient to re-establish with EP post-discharge  - Euvolemic on exam  -Chest pain with baseline elevated troponins, no delta  - EF remains preserved; new LA dilation  - Continue Eliquis 2.5 mg twice daily. Question of missed doses 3 days ago  - Continue metoprolol XL 25 mg daily  -Well rate controlled. Decrease dilt gtt to 5 mg/hr. Transition to PO antiarrhythmic therapies  -Telemetry monitoring  -T4 slightly elevated, per primary tea.  - N.p.o. for VELMA/DCCV   -Reschedule OP stress test after discharge    Thank you for allowing me to participate in the care of this patient.    I will continue to follow this patient    Please contact me with any questions.    Please see Dr. Pettit's  attestation for further details and MDM.     MALGORZATA Hyde, CCK, HF-CERT   [facility]  [phone]    PLEASE NOTE: This Note was created using voice recognition Software. I have made every reasonable attempt to correct obvious errors, but I expect that there are errors of grammar and possibly content that I did not discover before finalizing the note                 [1] No Known Allergies

## 2025-06-12 NOTE — OR NURSING
Patient allergies and NPO status verified. Patient verbalizes understanding of pain scale, expected course of stay and plan of care; patient state verbal understanding at this time. IV access established.  [id number] used.

## 2025-06-12 NOTE — TELEPHONE ENCOUNTER
Patient scheduled for hospital fu with MT on 7-7-25. This appointment will print out on her discharge paperwork

## 2025-06-12 NOTE — TELEPHONE ENCOUNTER
----- Message from Nurse Practitioner ANGELITA Patrick sent at 6/12/2025  7:53 AM PDT -----  Regarding: Hospitalized for Aflutter  Referral placed for patient to re-establish with EP after discharge tomorrow. On review of previous EKGs concern for tachybrady with her arrhythmias.

## 2025-06-12 NOTE — PROCEDURES
Procedure performed: External Direct Current Cardioversion    : Milton Pettit MD    Assistant: None    Anesthesia: per Anesthesia services    Indication: Atrial Fibrillation    Preprocedural Diagnosis:   Atrial Fibrillation  Postprocedural Diagnosis: Sinus rhythm       Description of procedure:  Ms. Kenyetta Gallegos was brought to the pre/post procedure area of the cath lab. Informed consent was obtained. Defibrillator pads were placed in the anterior and posterior position.  Adequate sedation was obtained with assistance of anesthesia. The patient was successfully cardioverted with  200 J synchronized biphasic energy into sinus rhythm. She was monitored in the recovery area until criteria met.    Conclusion: Successful DC cardioversion    Complications: None apparent      Electronically signed: Milton Pettit MD  Cardiologist   [facility]

## 2025-06-12 NOTE — PROGRESS NOTES
Hospital Medicine Daily Progress Note    Date of Service  6/12/2025    Chief Complaint  Tere Gallegos is a 74 y.o. female admitted 6/11/2025 with afib with rvr    Hospital Course  History of atrial fibrillation with a planned outpatient stress test admitted for A-fib with RVR after holding metoprolol in preparation for stress test.  She remains on anticoagulation but rate control was unable to be obtained with metoprolol IV x 1, diltiazem IV x 2.  Cardiology is planning to do a VELMA with cardioversion    Interval Problem Update  6/12: Heart rate is in the 70s this morning, dilt drip.  Denies palpitations.    I have discussed this patient's plan of care and discharge plan at IDT rounds today with Case Management, Nursing, Nursing leadership, and other members of the IDT team.    Consultants/Specialty  Cardiology    Code Status  Full Code    Disposition  The patient is not medically cleared for discharge to home or a post-acute facility.  Anticipate discharge to: home with close outpatient follow-up    I have placed the appropriate orders for post-discharge needs.    Review of Systems  Review of Systems   Constitutional:  Negative for chills, fever and malaise/fatigue.   HENT:  Negative for sore throat.    Respiratory:  Negative for cough and shortness of breath.    Cardiovascular:  Negative for chest pain and palpitations.   Gastrointestinal:  Negative for abdominal pain, blood in stool, diarrhea, heartburn, nausea and vomiting.   Genitourinary:  Negative for dysuria and frequency.   Musculoskeletal:  Negative for back pain and myalgias.   Neurological:  Negative for dizziness, focal weakness, weakness and headaches.   Psychiatric/Behavioral:  Negative for depression and hallucinations.    All other systems reviewed and are negative.       Physical Exam  Temp:  [36 °C (96.8 °F)-36.9 °C (98.4 °F)] 36.5 °C (97.7 °F)  Pulse:  [] 54  Resp:  [15-19] 15  BP: (106-131)/(45-76) 111/56  SpO2:  [90 %-97 %] 95  %    Physical Exam  Constitutional:       General: She is not in acute distress.  HENT:      Nose: Nose normal.      Mouth/Throat:      Mouth: Mucous membranes are dry.   Cardiovascular:      Rate and Rhythm: Normal rate and regular rhythm.      Pulses: Normal pulses.      Comments: Rate has improved  Pulmonary:      Effort: Pulmonary effort is normal.      Breath sounds: Normal breath sounds.   Abdominal:      General: There is no distension.      Palpations: Abdomen is soft.   Musculoskeletal:         General: No swelling.      Cervical back: No muscular tenderness.   Lymphadenopathy:      Cervical: No cervical adenopathy.   Skin:     General: Skin is warm and dry.      Findings: No rash.   Neurological:      General: No focal deficit present.      Mental Status: She is alert and oriented to person, place, and time.      Motor: Weakness present.   Psychiatric:         Mood and Affect: Mood normal.         Thought Content: Thought content normal.         Fluids    Intake/Output Summary (Last 24 hours) at 6/12/2025 1711  Last data filed at 6/12/2025 1622  Gross per 24 hour   Intake 250 ml   Output --   Net 250 ml        Laboratory  Recent Labs     06/11/25  1055 06/12/25  0248   WBC 4.2* 4.2*   RBC 5.01 4.54   HEMOGLOBIN 13.7 12.5   HEMATOCRIT 43.2 39.5   MCV 86.2 87.0   MCH 27.3 27.5   MCHC 31.7* 31.6*   RDW 42.6 42.8   PLATELETCT 98* 112*   MPV 11.6 12.0     Recent Labs     06/11/25  1055 06/12/25  0248   SODIUM 133* 133*   POTASSIUM 4.3 5.0   CHLORIDE 102 103   CO2 18* 17*   GLUCOSE 228* 284*   BUN 30* 36*   CREATININE 1.40 1.68*   CALCIUM 9.7 9.5                   Imaging  EC-ECHOCARDIOGRAM COMPLETE W/O CONT   Final Result      DX-CHEST-PORTABLE (1 VIEW)   Final Result      Cardiomegaly.      EC-VELMA W/O CONT    (Results Pending)   CL-CARDIOVERSION    (Results Pending)        Assessment/Plan  * Atrial fibrillation with rapid ventricular response (HCC)- (present on admission)  Assessment & Plan  Patient was  scheduled for an outpatient stress test and with this the patient's metoprolol was held for 3 days.  Because of this she went into atrial fibrillation with rapid ventricular response.  Dill drip overnight, rate improved  Continue to metoprolol XL 25 daily  Continue Eliquis 2.5 twice daily   VELMA with cardioversion today   Reschedule outpatient stress test    CKD (chronic kidney disease) stage 4, GFR 15-29 ml/min ([facility])- (present on admission)  Assessment & Plan  Creatinine is in the range of her baseline  1.6  Repeat in a.m.    Immunosuppressive management encounter following kidney transplant- (present on admission)  Assessment & Plan  Continue tacrolimus, CellCept, prednisone    GERD (gastroesophageal reflux disease)- (present on admission)  Assessment & Plan  Currently denies any heartburn    Hypothyroidism, acquired- (present on admission)  Assessment & Plan  Continue Synthroid 88 mcg daily  TSH 0.625    Chronic heart failure with preserved ejection fraction ([facility])- (present on admission)  Assessment & Plan  Most recent left ventricular ejection fraction is 65%  Continue metoprolol    Coronary artery disease with angina pectoris with documented spasm ([facility])- (present on admission)  Assessment & Plan  Patient has 2 stents in place to the RCA  Continue with metoprolol, Lipitor, Eliquis    Mixed hyperlipidemia- (present on admission)  Assessment & Plan  Low-fat low-cholesterol diet  Continue with Lipitor 20 mg nightly  Fasting lipid panel    Type 2 diabetes mellitus with stage 4 chronic kidney disease, with long-term current use of insulin ([facility])- (present on admission)  Assessment & Plan  -accus with sliding scale coverage, continue Lantus and 30 units in the morning and 20 units at night  -diabetic diet  -diabetic education  -follow glycohemoglobin levels long term, very poorly controlled most recent hemoglobin A1c is 11.4  -monitor for hypoglycemic episodes and adjust control if he should get low    Primary  hypertension- (present on admission)  Assessment & Plan  I will optimize blood pressure management keep systolic blood pressure less than 140 diastolic under 90  Resume Norvasc 10 mg daily, metoprolol XL 25 mg daily as needed hydralazine         VTE prophylaxis: Eliquis    I have performed a physical exam and reviewed and updated ROS and Plan today (6/12/2025). In review of yesterday's note (6/11/2025), there are no changes except as documented above.    Total time:  51 minutes.  I spent greater than 50% of the time for patient care, counseling, and coordination on this date, including unit/floor time, and face-to-face time with the patient as per interval events and assessment and plan above

## 2025-06-12 NOTE — PROGRESS NOTES
Telemetry Shift Summary     Rhythm: A.Fib/A.flutter  HR Range:75-91  Ectopy: rPVC  Measurements:/0.08/    Normal Values  Measurements: 0.12- 0.20 / 0.08-0.10 / 0.30-0.52

## 2025-06-12 NOTE — ASSESSMENT & PLAN NOTE
Creatinine is rising, likely from RVR and procedure being NPO  Discussed with Dr. Najjar - he recommends holding lasix  Gentle fluids x500mL  Repeat shows Cr>2  HyperK and low Na  Repeat at 1900

## 2025-06-12 NOTE — OR NURSING
1624 [facility], resp even and unlabored, bite block in place. Patient drowsy. ID band verified     1632 bite block removed.     1639 Refused sips of water, patient awake and alert     1640 Called Manoj, support person, with update. Left voicemail.    1645 Called report to Pamela ROBLES.     1654 Meets criteria to transfer back to floor, VSS.  Good condition for dc.

## 2025-06-12 NOTE — PROGRESS NOTES
4 Eyes Skin Assessment Completed by TRAVIS Doyle and TRAVIS Flores.    Skin assessment is primarily focused on high risk bony prominences. Pay special attention to skin beneath and around medical devices, high risk bony prominences, skin to skin areas and areas where the patient lacks sensation to feel pain and areas where the patient previously had breakdown.     Head (Occipital):  WDL   Ears (Under Medical Devices): Red and Blanching   Nose (Under Medical Devices): WDL   Mouth:  WDL   Neck: WDL   Breast/Chest:  WDL   Shoulder Blades:  WDL   Spine:   WDL   (R) Arm/Elbow/Hand: WDL   (L) Arm/Elbow/Hand: Fistula   Abdomen: WDL   Pannus/Groin:  WDL   Sacrum/Coccyx:   WDL   (R) Ischial Tuberosity (Sit Bones):  WDL   (L) Ischial Tuberosity (Sit Bones):  WDL   (R) Leg:  WDL   (L) Leg:  WDL   (R) Heel:  WDL   (R) Foot/Toe: WDL   (L) Heel: WDL   (L) Foot/Toe:  WDL       DEVICES IN USE:   Respiratory Devices:  NA, patient on room air  Feeding Devices:  N/A   Lines & BP Monitoring Devices:  Peripheral IV    Orthopedic Devices:  N/A  Miscellaneous Devices:  N/A    PROTOCOL INTERVENTIONS:   Standard/Trauma Bed:  Already in place    WOUND PHOTOS:   N/A no wounds identified    WOUND CONSULT:   N/A, no advanced wound care needs identified

## 2025-06-12 NOTE — HOSPITAL COURSE
History of atrial fibrillation with a planned outpatient stress test admitted for A-fib with RVR after holding metoprolol in preparation for stress test.  She remains on anticoagulation but rate control was unable to be obtained with metoprolol IV x 1, diltiazem IV x 2.  Cardiology is planning to do a VELMA with cardioversion

## 2025-06-12 NOTE — CARE PLAN
The patient is Stable - Low risk of patient condition declining or worsening    Shift Goals  Clinical Goals: skin check, monitor vitals  Patient Goals: comfort    Progress made toward(s) clinical / shift goals:    Problem: Knowledge Deficit - Standard  Goal: Patient and family/care givers will demonstrate understanding of plan of care, disease process/condition, diagnostic tests and medications  Outcome: Progressing  Note: Patient education done on treatment plan, to which patient is compliant to     Problem: Pain - Standard  Goal: Alleviation of pain or a reduction in pain to the patient’s comfort goal  Outcome: Progressing  Note: Patient on assessment declines having pain, patient left resting comfortably in bed       Patient is not progressing towards the following goals:

## 2025-06-12 NOTE — ANESTHESIA PREPROCEDURE EVALUATION
Case: [id number] Date/Time: 25 2009    Procedures:       CARDIOVERSION      ECHOCARDIOGRAM, TRANSESOPHAGEAL    Location:  ENDOSCOPIC ULTRASOUND ROOM / SURGERY [facility]    Surgeons: Milton Pettit M.D.          paroxysmal A-fib, CAD with  RCA with left-to-right collateral; NOEMI ×2 to RCA on 16, HFpEF, renal transplant on immunosuppression therapy, CKD 3a, T2DM     Relevant Problems   CARDIAC   (positive) Atrial fibrillation with rapid ventricular response (HCC)   (positive) Coronary artery disease with angina pectoris with documented spasm (HCC)   (positive) Paroxysmal atrial fibrillation (HCC)   (positive) Primary hypertension      GI   (positive) GERD (gastroesophageal reflux disease)         (positive) CKD (chronic kidney disease) stage 4, GFR 15-29 ml/min (HCC)   (positive) Other hydronephrosis   (positive) Stage 3a chronic kidney disease      ENDO   (positive) Hypothyroidism, acquired   (positive) Type 2 diabetes mellitus with stage 4 chronic kidney disease, with long-term current use of insulin (HCC)      Other   (positive) Chronic heart failure with preserved ejection fraction (HCC)   (positive) -donor kidney transplant recipient   (positive) Immunosuppressive management encounter following kidney transplant     Most Recent TTE:  Left Ventricle  Normal left ventricular chamber size. Mild concentric left ventricular hypertrophy. The left ventricular ejection fraction is visually estimated to be 60%. Normal regional wall motion. Diastolic function is difficult to assess with arrhythmia.     Right Ventricle  The right ventricle is normal in size and systolic function.     Right Atrium  The right atrium is normal in size. Dilated inferior vena cava with inspiratory collapse.     Left Atrium  Severely dilated left atrium. Left atrial volume index is 62 mL/sq m.     Mitral Valve  Mild mitral annular calcification. No mitral stenosis. Trace mitral regurgitation.     Aortic  Valve  Structurally normal aortic valve without significant stenosis or   regurgitation.     Tricuspid Valve  Structurally normal tricuspid valve without significant stenosis. Trace tricuspid regurgitation. Right ventricular systolic pressure is estimated to be 29  mmHg. Right atrial pressure is estimated to be 8 mmHg.     Pulmonic Valve  Structurally normal pulmonic valve without significant stenosis or regurgitation.     Pericardium  No pericardial effusion.     Aorta  Normal aortic root for body surface area. The ascending aorta diameter is 3.1 cm.    Most Recent EKG:  A-flutter w 4:1 block    Physical Exam    Airway   Mallampati: II  TM distance: >3 FB  Neck ROM: full       Cardiovascular - normal exam  Rhythm: regular  Rate: normal     Dental - normal exam           Pulmonary - normal examBreath sounds clear to auscultation     Abdominal    Neurological - normal exam                   Anesthesia Plan    ASA 4- EMERGENT   ASA physical status 4 criteria: ongoing cardiac ischemia or unstable anginaASA physical status emergent criteria: compromised vital organ, limb or tissue and cardiac compromise requiring immediate intervention    Plan - MAC               Induction: intravenous    Postoperative Plan: Postoperative administration of opioids is intended.    Pertinent diagnostic labs and testing reviewed    Informed Consent:    Anesthetic plan and risks discussed with patient.    Use of blood products discussed with: patient whom consented to blood products.

## 2025-06-12 NOTE — CARE PLAN
The patient is Stable - Low risk of patient condition declining or worsening    Shift Goals  Clinical Goals: HDS, DCCV  Patient Goals: comfort  Family Goals: megan    Progress made toward(s) clinical / shift goals:    Problem: Knowledge Deficit - Standard  Goal: Patient and family/care givers will demonstrate understanding of plan of care, disease process/condition, diagnostic tests and medications  Outcome: Progressing  Note: Discussed plan of care, pt able to actively participate in the learning process and demonstrates understanding by return demonstration or return explanation. Improved ability to communicate regarding their healthcare and current admission. Improved ability to identify barriers to learning and care.       Problem: Pain - Standard  Goal: Alleviation of pain or a reduction in pain to the patient’s comfort goal  Outcome: Progressing  Note: Pt reports pain well controlled with current therapy. Additional non-pharmacologic interventions implemented. Education on pain reduction goals, pain rating scale, and potential side effects of pharmacologic interventions. Demonstrates use of appropriate diversional activities and/or relaxation techniques.       Problem: Cardiac - Atrial Fibrillation  Goal: Patient will achieve & maintain adequate cardiac output and rate control  Outcome: Progressing  Goal: Complications related to anticoagulation for unconverted atrial fibrillation will be avoided or minimized  Outcome: Progressing  Note: Plan for VELMA/DCCV today

## 2025-06-13 ENCOUNTER — TELEPHONE (OUTPATIENT)
Dept: CARDIOLOGY | Facility: MEDICAL CENTER | Age: 75
End: 2025-06-13
Payer: MEDICARE

## 2025-06-13 LAB
ALBUMIN SERPL BCP-MCNC: 4 G/DL (ref 3.2–4.9)
ANION GAP SERPL CALC-SCNC: 14 MMOL/L (ref 7–16)
APPEARANCE UR: CLEAR
BACTERIA #/AREA URNS HPF: ABNORMAL /HPF
BILIRUB UR QL STRIP.AUTO: NEGATIVE
BUN SERPL-MCNC: 38 MG/DL (ref 8–22)
CALCIUM ALBUM COR SERPL-MCNC: 9.8 MG/DL (ref 8.5–10.5)
CALCIUM SERPL-MCNC: 9.8 MG/DL (ref 8.5–10.5)
CASTS URNS QL MICRO: ABNORMAL /LPF (ref 0–2)
CHLORIDE SERPL-SCNC: 103 MMOL/L (ref 96–112)
CO2 SERPL-SCNC: 17 MMOL/L (ref 20–33)
COLOR UR: YELLOW
CREAT SERPL-MCNC: 2.07 MG/DL (ref 0.5–1.4)
EPITHELIAL CELLS 1715: ABNORMAL /HPF (ref 0–5)
ERYTHROCYTE [DISTWIDTH] IN BLOOD BY AUTOMATED COUNT: 43.5 FL (ref 35.9–50)
GFR SERPLBLD CREATININE-BSD FMLA CKD-EPI: 24 ML/MIN/1.73 M 2
GLUCOSE BLD STRIP.AUTO-MCNC: 234 MG/DL (ref 65–99)
GLUCOSE BLD STRIP.AUTO-MCNC: 238 MG/DL (ref 65–99)
GLUCOSE BLD STRIP.AUTO-MCNC: 281 MG/DL (ref 65–99)
GLUCOSE BLD STRIP.AUTO-MCNC: 386 MG/DL (ref 65–99)
GLUCOSE SERPL-MCNC: 255 MG/DL (ref 65–99)
GLUCOSE UR STRIP.AUTO-MCNC: >=1000 MG/DL
HCT VFR BLD AUTO: 43.2 % (ref 37–47)
HGB BLD-MCNC: 13.5 G/DL (ref 12–16)
HYALINE CAST   1831: PRESENT /LPF
KETONES UR STRIP.AUTO-MCNC: ABNORMAL MG/DL
LEUKOCYTE ESTERASE UR QL STRIP.AUTO: ABNORMAL
MAGNESIUM SERPL-MCNC: 2.1 MG/DL (ref 1.5–2.5)
MCH RBC QN AUTO: 27.2 PG (ref 27–33)
MCHC RBC AUTO-ENTMCNC: 31.3 G/DL (ref 32.2–35.5)
MCV RBC AUTO: 87.1 FL (ref 81.4–97.8)
MICRO URNS: ABNORMAL
NITRITE UR QL STRIP.AUTO: NEGATIVE
PH UR STRIP.AUTO: 5 [PH] (ref 5–8)
PHOSPHATE SERPL-MCNC: 3 MG/DL (ref 2.5–4.5)
PLATELET # BLD AUTO: 113 K/UL (ref 164–446)
PMV BLD AUTO: 11.9 FL (ref 9–12.9)
POTASSIUM SERPL-SCNC: 5.2 MMOL/L (ref 3.6–5.5)
PROT UR QL STRIP: NEGATIVE MG/DL
RBC # BLD AUTO: 4.96 M/UL (ref 4.2–5.4)
RBC # URNS HPF: ABNORMAL /HPF
RBC UR QL AUTO: NEGATIVE
SODIUM SERPL-SCNC: 134 MMOL/L (ref 135–145)
SP GR UR STRIP.AUTO: 1.02
UROBILINOGEN UR STRIP.AUTO-MCNC: 1 EU/DL
WBC # BLD AUTO: 4.3 K/UL (ref 4.8–10.8)
WBC #/AREA URNS HPF: ABNORMAL /HPF

## 2025-06-13 PROCEDURE — 700102 HCHG RX REV CODE 250 W/ 637 OVERRIDE(OP): Performed by: NURSE PRACTITIONER

## 2025-06-13 PROCEDURE — 700111 HCHG RX REV CODE 636 W/ 250 OVERRIDE (IP): Performed by: HOSPITALIST

## 2025-06-13 PROCEDURE — 700102 HCHG RX REV CODE 250 W/ 637 OVERRIDE(OP): Performed by: HOSPITALIST

## 2025-06-13 PROCEDURE — 99222 1ST HOSP IP/OBS MODERATE 55: CPT | Performed by: INTERNAL MEDICINE

## 2025-06-13 PROCEDURE — 93312 ECHO TRANSESOPHAGEAL: CPT | Mod: 26 | Performed by: STUDENT IN AN ORGANIZED HEALTH CARE EDUCATION/TRAINING PROGRAM

## 2025-06-13 PROCEDURE — 770020 HCHG ROOM/CARE - TELE (206)

## 2025-06-13 PROCEDURE — 83735 ASSAY OF MAGNESIUM: CPT

## 2025-06-13 PROCEDURE — 94760 N-INVAS EAR/PLS OXIMETRY 1: CPT

## 2025-06-13 PROCEDURE — A9270 NON-COVERED ITEM OR SERVICE: HCPCS | Performed by: STUDENT IN AN ORGANIZED HEALTH CARE EDUCATION/TRAINING PROGRAM

## 2025-06-13 PROCEDURE — 82962 GLUCOSE BLOOD TEST: CPT | Performed by: INTERNAL MEDICINE

## 2025-06-13 PROCEDURE — 80069 RENAL FUNCTION PANEL: CPT

## 2025-06-13 PROCEDURE — A9270 NON-COVERED ITEM OR SERVICE: HCPCS | Performed by: NURSE PRACTITIONER

## 2025-06-13 PROCEDURE — 99232 SBSQ HOSP IP/OBS MODERATE 35: CPT | Performed by: NURSE PRACTITIONER

## 2025-06-13 PROCEDURE — 99232 SBSQ HOSP IP/OBS MODERATE 35: CPT | Performed by: INTERNAL MEDICINE

## 2025-06-13 PROCEDURE — 85027 COMPLETE CBC AUTOMATED: CPT

## 2025-06-13 PROCEDURE — A9270 NON-COVERED ITEM OR SERVICE: HCPCS | Performed by: HOSPITALIST

## 2025-06-13 PROCEDURE — 36415 COLL VENOUS BLD VENIPUNCTURE: CPT

## 2025-06-13 PROCEDURE — 81001 URINALYSIS AUTO W/SCOPE: CPT

## 2025-06-13 PROCEDURE — 700105 HCHG RX REV CODE 258: Performed by: INTERNAL MEDICINE

## 2025-06-13 PROCEDURE — 700102 HCHG RX REV CODE 250 W/ 637 OVERRIDE(OP): Performed by: STUDENT IN AN ORGANIZED HEALTH CARE EDUCATION/TRAINING PROGRAM

## 2025-06-13 RX ORDER — SODIUM CHLORIDE 9 MG/ML
INJECTION, SOLUTION INTRAVENOUS CONTINUOUS
Status: DISCONTINUED | OUTPATIENT
Start: 2025-06-13 | End: 2025-06-13

## 2025-06-13 RX ADMIN — INSULIN LISPRO 3 UNITS: 100 INJECTION, SOLUTION INTRAVENOUS; SUBCUTANEOUS at 20:26

## 2025-06-13 RX ADMIN — TRAMADOL HYDROCHLORIDE 50 MG: 50 TABLET, COATED ORAL at 00:54

## 2025-06-13 RX ADMIN — SENNOSIDES AND DOCUSATE SODIUM 2 TABLET: 50; 8.6 TABLET ORAL at 17:35

## 2025-06-13 RX ADMIN — AMIODARONE HYDROCHLORIDE 400 MG: 200 TABLET ORAL at 17:36

## 2025-06-13 RX ADMIN — AMIODARONE HYDROCHLORIDE 400 MG: 200 TABLET ORAL at 05:55

## 2025-06-13 RX ADMIN — TACROLIMUS 1 MG: 1 CAPSULE ORAL at 17:35

## 2025-06-13 RX ADMIN — APIXABAN 2.5 MG: 2.5 TABLET, FILM COATED ORAL at 05:54

## 2025-06-13 RX ADMIN — INSULIN LISPRO 2 UNITS: 100 INJECTION, SOLUTION INTRAVENOUS; SUBCUTANEOUS at 06:06

## 2025-06-13 RX ADMIN — SODIUM CHLORIDE: 9 INJECTION, SOLUTION INTRAVENOUS at 09:04

## 2025-06-13 RX ADMIN — LEVOTHYROXINE SODIUM 88 MCG: 0.09 TABLET ORAL at 05:53

## 2025-06-13 RX ADMIN — FUROSEMIDE 80 MG: 40 TABLET ORAL at 05:56

## 2025-06-13 RX ADMIN — MYCOPHENOLATE MOFETIL 250 MG: 250 CAPSULE ORAL at 17:35

## 2025-06-13 RX ADMIN — MYCOPHENOLATE MOFETIL 250 MG: 250 CAPSULE ORAL at 05:54

## 2025-06-13 RX ADMIN — INSULIN LISPRO 5 UNITS: 100 INJECTION, SOLUTION INTRAVENOUS; SUBCUTANEOUS at 11:54

## 2025-06-13 RX ADMIN — ATORVASTATIN CALCIUM 20 MG: 20 TABLET, FILM COATED ORAL at 17:36

## 2025-06-13 RX ADMIN — DAPAGLIFLOZIN 5 MG: 10 TABLET, FILM COATED ORAL at 05:57

## 2025-06-13 RX ADMIN — INSULIN LISPRO 2 UNITS: 100 INJECTION, SOLUTION INTRAVENOUS; SUBCUTANEOUS at 17:29

## 2025-06-13 RX ADMIN — APIXABAN 2.5 MG: 2.5 TABLET, FILM COATED ORAL at 17:35

## 2025-06-13 RX ADMIN — METOPROLOL SUCCINATE 25 MG: 25 TABLET, EXTENDED RELEASE ORAL at 05:53

## 2025-06-13 RX ADMIN — TACROLIMUS 1 MG: 1 CAPSULE ORAL at 05:54

## 2025-06-13 RX ADMIN — PREDNISONE 5 MG: 5 TABLET ORAL at 05:57

## 2025-06-13 RX ADMIN — AMLODIPINE BESYLATE 10 MG: 5 TABLET ORAL at 05:56

## 2025-06-13 ASSESSMENT — ENCOUNTER SYMPTOMS
DIARRHEA: 0
FATIGUE: 0
VOMITING: 0
SORE THROAT: 0
CHILLS: 0
FEVER: 0
BLOOD IN STOOL: 0
FOCAL WEAKNESS: 0
HEARTBURN: 0
DIZZINESS: 0
DEPRESSION: 0
WEAKNESS: 0
ABDOMINAL PAIN: 0
HALLUCINATIONS: 0
HEADACHES: 0
PALPITATIONS: 0
BACK PAIN: 0
SHORTNESS OF BREATH: 0
MYALGIAS: 0
COUGH: 0
NAUSEA: 0
CHEST TIGHTNESS: 0

## 2025-06-13 ASSESSMENT — PAIN DESCRIPTION - PAIN TYPE
TYPE: ACUTE PAIN

## 2025-06-13 NOTE — CARE PLAN
The patient is Stable - Low risk of patient condition declining or worsening    Shift Goals  Clinical Goals: HDS, DCCV  Patient Goals: comfort  Family Goals: megan    Progress made toward(s) clinical / shift goals:    Problem: Knowledge Deficit - Standard  Goal: Patient and family/care givers will demonstrate understanding of plan of care, disease process/condition, diagnostic tests and medications  Outcome: Progressing  Note: Patient requires  for communication. Patient is encouraged to verbalize feelings and needs. Patient utilizes  when needed.        Patient is not progressing towards the following goals:

## 2025-06-13 NOTE — PROGRESS NOTES
Hospital Medicine Daily Progress Note    Date of Service  6/13/2025    Chief Complaint  Tere Gallegos is a 74 y.o. female admitted 6/11/2025 with afib with rvr    Hospital Course  History of atrial fibrillation with a planned outpatient stress test admitted for A-fib with RVR after holding metoprolol in preparation for stress test.  She remains on anticoagulation but rate control was unable to be obtained with metoprolol IV x 1, diltiazem IV x 2.  Cardiology is planning to do a VELMA with cardioversion    Interval Problem Update  6/12: Heart rate is in the 70s this morning, dilt drip.  Denies palpitations.    6/13: Heart rate is stable in sinus in the 60s to 70s.  Tolerating metoprolol.  Creatinine has increased slightly to 2.0 today.  Hold Lasix.  I discussed with nephrology given that she is a transplant patient    I have discussed this patient's plan of care and discharge plan at IDT rounds today with Case Management, Nursing, Nursing leadership, and other members of the IDT team.    Consultants/Specialty  Cardiology    Code Status  Full Code    Disposition  The patient is not medically cleared for discharge to home or a post-acute facility.  Anticipate discharge to: home with close outpatient follow-up    I have placed the appropriate orders for post-discharge needs.    Review of Systems  Review of Systems   Constitutional:  Negative for chills, fever and malaise/fatigue.   HENT:  Negative for sore throat.    Respiratory:  Negative for cough and shortness of breath.    Cardiovascular:  Negative for chest pain and palpitations.   Gastrointestinal:  Negative for abdominal pain, blood in stool, diarrhea, heartburn, nausea and vomiting.   Genitourinary:  Negative for dysuria and frequency.   Musculoskeletal:  Negative for back pain and myalgias.   Neurological:  Negative for dizziness, focal weakness, weakness and headaches.   Psychiatric/Behavioral:  Negative for depression and hallucinations.    All  other systems reviewed and are negative.       Physical Exam  Temp:  [36 °C (96.8 °F)-36.8 °C (98.3 °F)] 36.2 °C (97.1 °F)  Pulse:  [52-66] 57  Resp:  [15-19] 19  BP: (106-138)/(45-66) 122/57  SpO2:  [90 %-97 %] 94 %    Physical Exam  Constitutional:       General: She is not in acute distress.  HENT:      Nose: Nose normal.      Mouth/Throat:      Mouth: Mucous membranes are dry.   Cardiovascular:      Rate and Rhythm: Normal rate and regular rhythm.      Pulses: Normal pulses.      Comments: Rate has improved  Pulmonary:      Effort: Pulmonary effort is normal.      Breath sounds: Normal breath sounds.   Abdominal:      General: There is no distension.      Palpations: Abdomen is soft.   Musculoskeletal:         General: No swelling.      Cervical back: No muscular tenderness.   Lymphadenopathy:      Cervical: No cervical adenopathy.   Skin:     General: Skin is warm and dry.      Coloration: Skin is pale.      Findings: No rash.   Neurological:      General: No focal deficit present.      Mental Status: She is alert and oriented to person, place, and time.      Motor: Weakness present.   Psychiatric:         Mood and Affect: Mood normal.         Thought Content: Thought content normal.         Fluids    Intake/Output Summary (Last 24 hours) at 6/13/2025 1603  Last data filed at 6/13/2025 0904  Gross per 24 hour   Intake 650 ml   Output --   Net 650 ml        Laboratory  Recent Labs     06/11/25  1055 06/12/25  0248 06/13/25  0126   WBC 4.2* 4.2* 4.3*   RBC 5.01 4.54 4.96   HEMOGLOBIN 13.7 12.5 13.5   HEMATOCRIT 43.2 39.5 43.2   MCV 86.2 87.0 87.1   MCH 27.3 27.5 27.2   MCHC 31.7* 31.6* 31.3*   RDW 42.6 42.8 43.5   PLATELETCT 98* 112* 113*   MPV 11.6 12.0 11.9     Recent Labs     06/11/25  1055 06/12/25  0248 06/13/25  0126   SODIUM 133* 133* 134*   POTASSIUM 4.3 5.0 5.2   CHLORIDE 102 103 103   CO2 18* 17* 17*   GLUCOSE 228* 284* 255*   BUN 30* 36* 38*   CREATININE 1.40 1.68* 2.07*   CALCIUM 9.7 9.5 9.8                    Imaging  EC-VELMA W/O CONT   Final Result      EC-ECHOCARDIOGRAM COMPLETE W/O CONT   Final Result      DX-CHEST-PORTABLE (1 VIEW)   Final Result      Cardiomegaly.      CL-CARDIOVERSION    (Results Pending)        Assessment/Plan  * Atrial fibrillation with rapid ventricular response (HCC)- (present on admission)  Assessment & Plan  Patient was scheduled for an outpatient stress test and with this the patient's metoprolol was held for 3 days.  Because of this she went into atrial fibrillation with rapid ventricular response.  Dilt drip 6/11-6/12  Cardioversion on 6/12 was successful, she remains in normal sinus rhythm  Continue to metoprolol XL 25 daily  Continue Eliquis 2.5 twice daily   Reschedule outpatient stress test    CKD (chronic kidney disease) stage 4, GFR 15-29 ml/min ([facility])- (present on admission)  Assessment & Plan  Creatinine is rising, likely from RVR and procedure being NPO  Discussed with Dr. Najjar - he recommends holding lasix  Hold lasix  Repeat in a.m.    Immunosuppressive management encounter following kidney transplant- (present on admission)  Assessment & Plan  Continue tacrolimus, CellCept, prednisone    GERD (gastroesophageal reflux disease)- (present on admission)  Assessment & Plan  Currently denies any heartburn    Hypothyroidism, acquired- (present on admission)  Assessment & Plan  Continue Synthroid 88 mcg daily  TSH 0.625    Chronic heart failure with preserved ejection fraction (HCC)- (present on admission)  Assessment & Plan  Most recent left ventricular ejection fraction is 65%  Continue metoprolol    Coronary artery disease with angina pectoris with documented spasm (HCC)- (present on admission)  Assessment & Plan  Patient has 2 stents in place to the RCA  Continue with metoprolol, Lipitor, Eliquis    Mixed hyperlipidemia- (present on admission)  Assessment & Plan  Low-fat low-cholesterol diet  Continue with Lipitor 20 mg nightly  Fasting lipid panel    Type 2 diabetes  mellitus with stage 4 chronic kidney disease, with long-term current use of insulin (HCC)- (present on admission)  Assessment & Plan  -accus with sliding scale coverage, continue Lantus and 30 units in the morning and 20 units at night  -diabetic diet  -diabetic education  -follow glycohemoglobin levels long term, very poorly controlled most recent hemoglobin A1c is 11.4  -monitor for hypoglycemic episodes and adjust control if he should get low    Primary hypertension- (present on admission)  Assessment & Plan  I will optimize blood pressure management keep systolic blood pressure less than 140 diastolic under 90  Resume Norvasc 10 mg daily, metoprolol XL 25 mg daily as needed hydralazine         VTE prophylaxis: Eliquis    I have performed a physical exam and reviewed and updated ROS and Plan today (6/13/2025). In review of yesterday's note (6/12/2025), there are no changes except as documented above.    Total time:  48 minutes.  I spent greater than 50% of the time for patient care, counseling, and coordination on this date, including unit/floor time, and face-to-face time with the patient as per interval events and assessment and plan above

## 2025-06-13 NOTE — PROGRESS NOTES
Telemetry Shift Summary      Rhythm: A.Fib/A.flutter  HR Range:    Ectopy:   Measurements: -/.10/.32      Normal Values  Measurements: 0.12- 0.20 / 0.08-0.10 / 0.30-0.52

## 2025-06-13 NOTE — PROGRESS NOTES
..Telemetry Shift Summary     Rhythm: SR  HR Range:80-97  Ectopy: RPAC, RPVC  Measurements: 0.13/0.08/0.32    Normal Values  Measurements: 0.12- 0.20 / 0.08-0.10 / 0.30-0.52

## 2025-06-13 NOTE — CARE PLAN
The patient is Stable - Low risk of patient condition declining or worsening    Shift Goals  Clinical Goals: HDS, safety  Patient Goals: go home  Family Goals: updates    Progress made toward(s) clinical / shift goals:    Problem: Knowledge Deficit - Standard  Goal: Patient and family/care givers will demonstrate understanding of plan of care, disease process/condition, diagnostic tests and medications  Outcome: Progressing  Note: Discussed plan of care, pt able to actively participate in the learning process and demonstrates understanding by return demonstration or return explanation. Improved ability to communicate regarding their healthcare and current admission. Improved ability to identify barriers to learning and care.       Problem: Pain - Standard  Goal: Alleviation of pain or a reduction in pain to the patient’s comfort goal  Outcome: Progressing  Note: Pt reports pain well controlled with current therapy. Additional non-pharmacologic interventions implemented. Education on pain reduction goals, pain rating scale, and potential side effects of pharmacologic interventions. Demonstrates use of appropriate diversional activities and/or relaxation techniques.       Problem: Cardiac - Atrial Fibrillation  Goal: Patient will achieve & maintain adequate cardiac output and rate control  Outcome: Progressing  Goal: Complications related to anticoagulation for unconverted atrial fibrillation will be avoided or minimized  Outcome: Progressing

## 2025-06-13 NOTE — CONSULTS
DATE OF SERVICE:  2025     REQUESTING PHYSICIAN:  Myrna Bond MD     REASON FOR CONSULTATION:  Acute kidney injury on chronic kidney disease, stage   IIIB.     The patient is seen and examined.  Medical records were reviewed.     HISTORY OF PRESENT ILLNESS:  The patient is a 74-year-old lady, who is very   well known to our service.  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  The patient presented to the hospital on  with cardiac   arrhythmia.  The patient again diagnosed with atrial fibrillation with rapid   ventricular response, underwent electrical cardioversion yesterday.  During   her hospitalization, her creatinine increased to 2.07 today.  Her baseline   from 2025 was around 1.5 mg/dL.     The patient has no hematuria or dysuria.  No recent use of NSAIDs or IV   contrast exposure.     The patient was n.p.o. for a period of time yesterday because of the   cardioversion procedure.     PAST MEDICAL HISTORY:  Significant for:  1.  Chronic kidney disease.  2.  History of kidney transplant.  3.  Diabetes mellitus.     ALLERGIES:  No known drug allergies.     SOCIAL HISTORY:  The patient has no smoking history.     FAMILY HISTORY:  No known renal disease.     MEDICATIONS:  Reviewed.     REVIEW OF SYSTEMS:  All systems were reviewed.  They were negative except   outlined in the history of present illness.     PHYSICAL EXAMINATION:  GENERAL:  The patient appears well, in no apparent distress.  VITAL SIGNS:  Showed blood pressure of 133/61, heart rate was 83, respiratory   rate was 19.  HEENT:  Normocephalic and atraumatic.  Sclerae are anicteric.  Pupil reactive.    Nose normal.  Mucous membranes moist.  NECK:  No lymphadenopathy.  No JVD.  No thyroid mass.  CHEST:  Normal.  LUNGS:  Clear to auscultation.  HEART:  S1 and S2.  ABDOMEN:  Soft, nontender.  No hepatosplenomegaly.  There is no tenderness at   the  allograft site.  There is no inguinal lymphadenopathy.  EXTREMITIES:  There is no lower extremity edema.  SKIN:  No skin rash.  NEUROLOGIC:  The patient is alert and oriented.  MOOD:  The patient is anxious.     LABORATORY DATA:  Her recent lab from today were reviewed.     DIAGNOSTIC DATA:  The patient had a chest x-ray done on 01/11.  I reviewed the   image myself, which showed cardiomegaly.     ASSESSMENT:  1.  Acute kidney injury on chronic kidney disease, stage IIIB, the etiology of   which is most likely a prerenal component secondary to the patient being NPO   versus cardiorenal syndrome.  The patient is very sensitive to volume status   as she is on chronic tacrolimus use.  2.  Mild hyponatremia.  3.  Atrial fibrillation.  4.  Metabolic acidosis.  5.  Thrombocytopenia.     PLAN:  1.  There is no acute need for renal replacement therapy.  2.  Discontinue diuretics.  3.  Encourage oral intakes.  4.  Check tacrolimus trough level.  5.  Daily labs.  6.  Renal diet.  7.  Renal dose all medications.  8.  Check urine protein to creatinine ratio.  9.  Prognosis guarded.     Plan discussed in detail with Dr. Bond.        ______________________________  FADI NAJJAR, MD FN/LUC    DD:  06/13/2025 14:08  DT:  06/13/2025 15:10    Job#:  [id number]

## 2025-06-13 NOTE — PROGRESS NOTES
...Telemetry Shift Summary     Rhythm: SR 1ST DEGREE  HR Range:62-64  Ectopy: RPVC, RPAC  Measurements: 0.21/0.10/0.45    Normal Values  Measurements: 0.12- 0.20 / 0.08-0.10 / 0.30-0.52==

## 2025-06-13 NOTE — PROGRESS NOTES
Cardiology Follow-up Progress Note    Date of Service  6/13/2025    Referring Physician  Vicki Maddox MD    Reason for Consultation  Atrial fibrillation with RVR    History of Presenting Illness  Tere Gallegos is a 74 y.o. female with a past medical history of paroxysmal A-fib (on Eliquis), CAD with  RCA with left-to-right collateral; NOEMI ×2 to RCA on 9/7/16, HFpEF, renal transplant ([facility] 2022) on immunosuppression therapy, CKD 3A (sees Dr. Ramos), T2DM who presented 6/11/2025 with atrial fibrillation with rapid ventricular rate noted prior to undergoing outpatient stress testing and was referred to ER.    Patient reports she has had chest pain for the last 3 weeks and has discussed this with her primary cardiologist, Dr. Crawley who she saw on 5/14/2025 who ordered stress testing as well as cardiac event monitoring.  Otherwise, patient feels well, denies shortness of breath, palpitations, dizziness/lightheadedness, orthopnea, PND or Edema.  Patient denies any other medication changes besides missing Eliquis.    Patient does have elevated troponins at baseline.     EKG during her last cardiology visit showed sinus tachycardia. Patient reports she did not take her Eliquis for the last 3 days and she resumed it this 6/12/2025.    Patient is well-known to cardiology office. Have discussed ablation with EP in the past. No decision was made at that time to pursue ablation.      Interval Update  6/13/2025: Tele monitoring personally interpreted by me shows normal sinus rhythm (rate 62-64) overnight with a first degree and some ectopy. Labs reviewed - notable for an increase in Cr from 1.68 to 2.07, decrease in GFR 31 to 24. K 5.2. Off dilt gtt. On oral amiodarone 400mg BID. OP FU with EP on 7/7/2025.     6/12/2025: No overnight cardiac events. Tele monitoring personally interpreted by me shows Aflutter 60's. VSS; RA. Labs reviewed, notable for K 5.0, kidney function appears baseline, T4 1.75. NPO for  JOCELYN/DCCV. Decrease dilt gtt to 5, transition to PO antiarrhythmic therapy after JOCELYN. OP FU with EP.     Disposition: Discharge likely today pending cardioversion outcome    Future Appointments   Date Time Provider Department Center   6/25/2025 11:20 AM ANGELITA Concepcion   7/7/2025  3:15 PM ANGELITA Cannon None   7/16/2025 10:00 AM ENDOCRINOLOGY [facility] PHARMACIST JASSON Vinson   7/23/2025 11:20 AM ANGELITA Concepcion   8/15/2025  2:45 PM ANGELITA Ortega None   8/20/2025 11:20 AM ANGELITA Concepcion     Review of Systems  Review of Systems   Constitutional:  Negative for fatigue.   Respiratory:  Negative for chest tightness and shortness of breath.    Cardiovascular:  Negative for chest pain, palpitations and leg swelling.   Gastrointestinal:  Negative for nausea.   Neurological:  Negative for dizziness and headaches.    Complete review of systems negative except as noted in HPI/subjective    Past Medical History   has a past medical history of Acquired hypothyroidism (05/04/2020), CAD (coronary artery disease), Chronic diastolic heart failure (HCC) (05/04/2020), Coronary artery disease due to lipid rich plaque, Dental disorder, Diabetes ([facility]), ESRD (end stage renal disease) on dialysis ([facility]) (05/04/2020), Hemodialysis patient ([facility]), Hyperlipidemia, Hypertension, Kidney transplant candidate, Kidney transplant recipient (10/31/2022), Presence of drug-eluting stent in right coronary artery, QT prolongation (01/22/2020), RLS (restless legs syndrome) (08/05/2016), and Transaminitis (12/22/2018).    Surgical History   has a past surgical history that includes recovery (08/16/2016); other abdominal surgery; other (Left, 2014); zzz cardiac cath (08/16/2016); zzz cardiac cath (09/07/2016); other abdominal surgery (Right, 10/31/2022); ureteral reimplantation (08/07/2023); cardioversion (N/A, 6/12/2025); and jocelyn (N/A,  2025).    Family History  Family History   Problem Relation Age of Onset    Diabetes Sister     Other Sister         liver disease    Diabetes Brother     Heart Disease Neg Hx      Social History   reports that she has never smoked. She has never used smokeless tobacco. She reports that she does not drink alcohol and does not use drugs.    Medications  Prior to Admission Medications   Prescriptions Last Dose Informant Patient Reported? Taking?   BD PEN NEEDLE PETE 2ND GEN  Patient's Home Pharmacy No No   Sig: USE AS DIRECTED WITH INSULIN PENS SIX TIMES DAILY   Continuous Glucose  (FREESTYLE BALDOMERO 3 READER) Device  Patient's Home Pharmacy No No   Sig: Use 1 each continuously.   Continuous Glucose Sensor (FREESTYLE BALDOMERO 3 PLUS SENSOR) [facility]  Patient's Home Pharmacy No No   Sig: Apply every 15 days   Continuous Glucose Sensor (FREESTYLE BALDOMERO 3 SENSOR) [facility]  Patient's Home Pharmacy No No   Si Each every 14 days.   FARXIGA 5 MG Tab Unknown Patient's Home Pharmacy No No   Sig: Take 1 Tablet by mouth every day. Por diabetes   LANTUS SOLOSTAR 100 UNIT/ML Solution Pen-injector injection Unknown Patient's Home Pharmacy No No   Sig: INJECT 10 UNITS UNDER THE SKIN 2 TIMES A DAY. 5 PENS PER MONTH   Patient taking differently: Inject 20-30 Units under the skin 2 times a day. Pt takes 30 units AM and 20 units in the evening   Lancets  Patient's Home Pharmacy No No   Sig: Use one covered lancet to test blood sugar three times daily.   amLODIPine (NORVASC) 10 MG Tab New Rx Patient's Home Pharmacy Yes Yes   Sig: Take 10 mg by mouth every day. Per [facility] last [facility] 2023, there is a prescription there wait to be picked up (2025)   apixaban (ELIQUIS) 2.5mg Tab 2025 at 10:00 AM Patient's Home Pharmacy No Yes   Sig: Take 1 Tablet by mouth 2 times a day. TOME 1 TABLETA POR VIA ORAL DOS VECES AL DESMOND   atorvastatin (LIPITOR) 20 MG Tab 6/10/2025 Evening Patient's Home Pharmacy No Yes   Sig: TOME 1 TABLETA POR VIA  ORAL TODOS LOS MCCORMICK EN LA NOCHE   furosemide (LASIX) 80 MG Tab Unknown Patient's Home Pharmacy No No   Sig: Take 1 Tablet by mouth every day.   glucose blood (ACCU-CHEK GUIDE) strip  Patient's Home Pharmacy No No   Sig: USE ONE COVERED STRIP TO TEST BLOOD SUGAR THREE TIMES DAILY.   insulin aspart (NOVOLOG) 100 UNIT/ML Solution Unknown Patient's Home Pharmacy Yes No   Sig: Inject 20 Units under the skin 3 times a day before meals. Per [facility] pt is to take 20 units TID with meals   No sliding scale or carb counting   levothyroxine (SYNTHROID) 88 MCG Tab Unknown Patient's Home Pharmacy No No   Sig: Take 1 Tablet by mouth every morning on an empty stomach.   metoprolol SR (TOPROL XL) 25 MG TABLET SR 24 HR Unknown Patient's Home Pharmacy Yes No   Sig: Take 25 mg by mouth every day.   mycophenolate (CELLCEPT) 250 MG Cap Unknown Patient's Home Pharmacy Yes No   Sig: Take 250 mg by mouth 2 times a day.   predniSONE (DELTASONE) 5 MG Tab Unknown Patient's Home Pharmacy Yes No   Sig: Take 5 mg by mouth every day.   tacrolimus (PROGRAF) 1 MG Cap Unknown Patient's Home Pharmacy Yes No   Sig: Take 1 mg by mouth 2 times a day.   vitamin D2, Ergocalciferol, (DRISDOL) 1.25 MG (14224 UT) Cap capsule Not Taking Patient's Home Pharmacy No No   Sig: Take 1 Capsule by mouth every 7 days.   Patient not taking: Reported on 6/11/2025      Facility-Administered Medications: None     Allergies  Allergies[1]    Vital signs in last 24 hours  Temp:  [36 °C (96.8 °F)-36.8 °C (98.3 °F)] 36.3 °C (97.3 °F)  Pulse:  [] 66  Resp:  [15-19] 19  BP: (106-138)/(45-68) 131/60  SpO2:  [90 %-97 %] 92 %    Physical Exam  Physical Exam  Vitals reviewed.   Constitutional:       General: She is not in acute distress.     Appearance: Normal appearance.   HENT:      Head: Normocephalic and atraumatic.   Eyes:      Extraocular Movements: Extraocular movements intact.      Conjunctiva/sclera: Conjunctivae normal.   Cardiovascular:      Rate and Rhythm: Normal  "rate and regular rhythm.      Pulses: Normal pulses.      Heart sounds: Normal heart sounds.   Pulmonary:      Effort: Pulmonary effort is normal. No respiratory distress.      Breath sounds: Normal breath sounds. No stridor. No wheezing, rhonchi or rales.   Chest:      Chest wall: No tenderness.   Abdominal:      Palpations: Abdomen is soft.   Musculoskeletal:      Right lower leg: No edema.      Left lower leg: No edema.   Skin:     General: Skin is warm and dry.      Findings: No rash.      Comments: L arm fistula   Neurological:      General: No focal deficit present.      Mental Status: She is alert and oriented to person, place, and time.      Motor: No weakness.      Gait: Gait normal.   Psychiatric:         Mood and Affect: Mood normal.         Behavior: Behavior normal.       Lab Review  Lab Results   Component Value Date/Time    WBC 4.3 (L) 06/13/2025 01:26 AM    RBC 4.96 06/13/2025 01:26 AM    HEMOGLOBIN 13.5 06/13/2025 01:26 AM    HEMATOCRIT 43.2 06/13/2025 01:26 AM    MCV 87.1 06/13/2025 01:26 AM    MCH 27.2 06/13/2025 01:26 AM    MCHC 31.3 (L) 06/13/2025 01:26 AM    MPV 11.9 06/13/2025 01:26 AM      Lab Results   Component Value Date/Time    SODIUM 134 (L) 06/13/2025 01:26 AM    POTASSIUM 5.2 06/13/2025 01:26 AM    CHLORIDE 103 06/13/2025 01:26 AM    CO2 17 (L) 06/13/2025 01:26 AM    GLUCOSE 255 (H) 06/13/2025 01:26 AM    BUN 38 (H) 06/13/2025 01:26 AM    CREATININE 2.07 (H) 06/13/2025 01:26 AM    BUNCREATRAT 8 (L) 01/28/2021 07:00 AM      Lab Results   Component Value Date/Time    ASTSGOT 18 04/05/2025 08:21 AM    ALTSGPT 22 04/05/2025 08:21 AM     Lab Results   Component Value Date/Time    CHOLSTRLTOT 134 04/05/2025 08:21 AM    LDL 47 04/05/2025 08:21 AM    HDL 35 (A) 04/05/2025 08:21 AM    TRIGLYCERIDE 259 (H) 04/05/2025 08:21 AM    TROPONINT 31 (H) 06/12/2025 02:48 AM       No results for input(s): \"NTPROBNP\" in the last 72 hours.    Cardiac Imaging and Procedures Review  EKG:  My personal " interpretation of the EKG dated 6/11/2025 is atrial fibrillation with ventricular rate 134    Echocardiogram (4/3/2023):  Normal left ventricular size, thickness, systolic function, and   diastolic function.  Mild mitral regurgitation.  Normal inferior vena cava size and inspiratory collapse.  Estimated right ventricular systolic pressure is 40 mmHg.    Cardiac Catheterization (6/8/2021):    POSTOPERATIVE DIAGNOSIS:  1.  Nonobstructive coronary artery disease.  2.  Widely patent previously placed RCA stents  3.  LVEF 70%, LVEDP 22 mmHg  FINDINGS:  I. HEMODYNAMICS:               Ao: 110/54 mmHg              LEDP: 22 mmHg              Gradient on LV pullback: No     II. LEFT VENTRICULOGRAM:              LVEF JOHNSON PROJECTION: 70%                III. CORONARY ANGIOGRAPHY:  Left Main: Large caliber bifurcating no CAD.  Left Anterior Descending: Moderate to large caliber vessel with usual complement of diagonals.  Mild nonobstructive CAD.  Left Circumflex: Large caliber Co-dominant supplying multiple large tortuous obtuse marginals with mild nonobstructive CAD.  Right Coronary: Small caliber supplying a moderate-sized RPDA.  This is a codominant fashion.  There is a widely patent long segment of previously placed stents in the midportion.    NM-Stress (12/2022): Normal Lexiscan myocardial perfusion study.   No evidence of ischemia or infarct.   SDS 0.   LVEF 70%.   No ischemic changes with Regadenoson.   No arrhythmias.   No chest pain.   ECG INTERPRETATION   Negative stress ECG for ischemia.    TTE (6/11/2025):  Normal left ventricular chamber size.  Normal left ventricular chamber size.  The left ventricular ejection fraction is visually estimated to be 60%.  Dilated inferior vena cava with inspiratory collapse.    Imaging  Chest X-Ray (6/11/2025):    There is no evidence of focal consolidation or evidence of pulmonary edema.  There is no pleural effusion.  The heart is enlarged.  Postsurgical changes are again seen.      Assessment/Plan  #Atrial fibrillation/flutter with RVR  - Last DCCV in 2023.  Previously seen by EP in 2023. Patient to re-establish with EP post-discharge  - s/p successful DCCV 6/12/2025  - Remained in NSR over night  - Continue amiodarone 400mg BID for 14 days. 200mg daily thereafter   - Continue Eliquis 2.5 mg twice daily without any interruptions for at least 30 days, given recent DCCV while hospitalized   - Continue metoprolol XL 25 mg daily  - Close follow up with EP outpatient     #CAD s/p PCI to RCA (2016)  #Hypertension  #Hyperlipidemia  #HFpEF  - Denies chest pain  - Baseline elevated troponin  - EF preserved; new LA dilation, cautious fluid administration due to HFpEF and dilated IVC per echo  - Continue amlodipine for blood pressure   - Euvolemic on exam  - Hold SGLT2i, (GFR 24) consider d/c if she has recurrent UTI or Cr continues to increase  - Continue lipitor 20mg for cholesterol management. Consider increasing to HI statin, given known CAD with prior stenting   - Reschedule OP stress test after discharge, will call to arrange    #End-stage renal disease s/p kidney transplant (2022), immunosuppression therapy  #CKD 3B  # Uncontrolled Type 2 Diabetes  - Nephrology following  - Cr elevation overnight   - Lasix held per Nephrology   - Diabetes management, most recent A1C is 11.4    Thank you for allowing me to participate in the care of this patient.    Cardiology will sign off.    Please see Dr. Pettit's attestation for further details.    SAM[facility] Martínez Cardiology  [facility]  [facility] [address] [address]  Phone: [phone]  Fax: [phone]    PLEASE NOTE: This note was created using voice recognition software. I have made every reasonable attempt to correct obvious errors, but I expect that there are errors of grammar and possibly content that I did not discover before finalizing the note.         [1] No Known Allergies

## 2025-06-14 PROBLEM — E87.6 HYPOKALEMIA: Status: ACTIVE | Noted: 2025-06-14

## 2025-06-14 LAB
ALBUMIN SERPL BCP-MCNC: 3.9 G/DL (ref 3.2–4.9)
ALBUMIN SERPL BCP-MCNC: 4 G/DL (ref 3.2–4.9)
ALBUMIN SERPL BCP-MCNC: 4.2 G/DL (ref 3.2–4.9)
ANION GAP SERPL CALC-SCNC: 14 MMOL/L (ref 7–16)
ANION GAP SERPL CALC-SCNC: 15 MMOL/L (ref 7–16)
ANION GAP SERPL CALC-SCNC: 16 MMOL/L (ref 7–16)
BUN SERPL-MCNC: 50 MG/DL (ref 8–22)
BUN SERPL-MCNC: 60 MG/DL (ref 8–22)
BUN SERPL-MCNC: 61 MG/DL (ref 8–22)
CALCIUM ALBUM COR SERPL-MCNC: 10 MG/DL (ref 8.5–10.5)
CALCIUM ALBUM COR SERPL-MCNC: 9.5 MG/DL (ref 8.5–10.5)
CALCIUM ALBUM COR SERPL-MCNC: 9.7 MG/DL (ref 8.5–10.5)
CALCIUM SERPL-MCNC: 10 MG/DL (ref 8.5–10.5)
CALCIUM SERPL-MCNC: 9.6 MG/DL (ref 8.5–10.5)
CALCIUM SERPL-MCNC: 9.7 MG/DL (ref 8.5–10.5)
CHLORIDE SERPL-SCNC: 100 MMOL/L (ref 96–112)
CHLORIDE SERPL-SCNC: 97 MMOL/L (ref 96–112)
CHLORIDE SERPL-SCNC: 97 MMOL/L (ref 96–112)
CO2 SERPL-SCNC: 16 MMOL/L (ref 20–33)
CREAT SERPL-MCNC: 2.01 MG/DL (ref 0.5–1.4)
CREAT SERPL-MCNC: 2.09 MG/DL (ref 0.5–1.4)
CREAT SERPL-MCNC: 2.12 MG/DL (ref 0.5–1.4)
GFR SERPLBLD CREATININE-BSD FMLA CKD-EPI: 24 ML/MIN/1.73 M 2
GFR SERPLBLD CREATININE-BSD FMLA CKD-EPI: 24 ML/MIN/1.73 M 2
GFR SERPLBLD CREATININE-BSD FMLA CKD-EPI: 25 ML/MIN/1.73 M 2
GLUCOSE BLD STRIP.AUTO-MCNC: 151 MG/DL (ref 65–99)
GLUCOSE BLD STRIP.AUTO-MCNC: 184 MG/DL (ref 65–99)
GLUCOSE BLD STRIP.AUTO-MCNC: 222 MG/DL (ref 65–99)
GLUCOSE BLD STRIP.AUTO-MCNC: 287 MG/DL (ref 65–99)
GLUCOSE SERPL-MCNC: 187 MG/DL (ref 65–99)
GLUCOSE SERPL-MCNC: 196 MG/DL (ref 65–99)
GLUCOSE SERPL-MCNC: 305 MG/DL (ref 65–99)
PHOSPHATE SERPL-MCNC: 3.8 MG/DL (ref 2.5–4.5)
PHOSPHATE SERPL-MCNC: 3.9 MG/DL (ref 2.5–4.5)
PHOSPHATE SERPL-MCNC: 4.3 MG/DL (ref 2.5–4.5)
POTASSIUM SERPL-SCNC: 5.7 MMOL/L (ref 3.6–5.5)
POTASSIUM SERPL-SCNC: 5.8 MMOL/L (ref 3.6–5.5)
POTASSIUM SERPL-SCNC: 5.9 MMOL/L (ref 3.6–5.5)
SODIUM SERPL-SCNC: 128 MMOL/L (ref 135–145)
SODIUM SERPL-SCNC: 129 MMOL/L (ref 135–145)
SODIUM SERPL-SCNC: 130 MMOL/L (ref 135–145)
TACROLIMUS BLD-MCNC: 5.1 NG/ML (ref 5–20)

## 2025-06-14 PROCEDURE — A9270 NON-COVERED ITEM OR SERVICE: HCPCS | Performed by: INTERNAL MEDICINE

## 2025-06-14 PROCEDURE — 99232 SBSQ HOSP IP/OBS MODERATE 35: CPT | Performed by: INTERNAL MEDICINE

## 2025-06-14 PROCEDURE — 700102 HCHG RX REV CODE 250 W/ 637 OVERRIDE(OP): Performed by: INTERNAL MEDICINE

## 2025-06-14 PROCEDURE — 94760 N-INVAS EAR/PLS OXIMETRY 1: CPT

## 2025-06-14 PROCEDURE — A9270 NON-COVERED ITEM OR SERVICE: HCPCS | Performed by: HOSPITALIST

## 2025-06-14 PROCEDURE — 80197 ASSAY OF TACROLIMUS: CPT

## 2025-06-14 PROCEDURE — 700102 HCHG RX REV CODE 250 W/ 637 OVERRIDE(OP): Performed by: HOSPITALIST

## 2025-06-14 PROCEDURE — 700102 HCHG RX REV CODE 250 W/ 637 OVERRIDE(OP): Performed by: NURSE PRACTITIONER

## 2025-06-14 PROCEDURE — 82962 GLUCOSE BLOOD TEST: CPT | Performed by: INTERNAL MEDICINE

## 2025-06-14 PROCEDURE — 700105 HCHG RX REV CODE 258: Performed by: INTERNAL MEDICINE

## 2025-06-14 PROCEDURE — 700111 HCHG RX REV CODE 636 W/ 250 OVERRIDE (IP): Performed by: HOSPITALIST

## 2025-06-14 PROCEDURE — 36415 COLL VENOUS BLD VENIPUNCTURE: CPT

## 2025-06-14 PROCEDURE — A9270 NON-COVERED ITEM OR SERVICE: HCPCS | Performed by: NURSE PRACTITIONER

## 2025-06-14 PROCEDURE — 80069 RENAL FUNCTION PANEL: CPT

## 2025-06-14 PROCEDURE — 770020 HCHG ROOM/CARE - TELE (206)

## 2025-06-14 PROCEDURE — 700101 HCHG RX REV CODE 250: Performed by: INTERNAL MEDICINE

## 2025-06-14 PROCEDURE — 700111 HCHG RX REV CODE 636 W/ 250 OVERRIDE (IP): Mod: JZ | Performed by: INTERNAL MEDICINE

## 2025-06-14 RX ORDER — CALCIUM GLUCONATE 20 MG/ML
1 INJECTION, SOLUTION INTRAVENOUS ONCE
Status: COMPLETED | OUTPATIENT
Start: 2025-06-14 | End: 2025-06-14

## 2025-06-14 RX ORDER — SODIUM CHLORIDE 9 MG/ML
INJECTION, SOLUTION INTRAVENOUS CONTINUOUS
Status: ACTIVE | OUTPATIENT
Start: 2025-06-14 | End: 2025-06-14

## 2025-06-14 RX ORDER — DEXTROSE MONOHYDRATE 25 G/50ML
12.5 INJECTION, SOLUTION INTRAVENOUS ONCE
Status: COMPLETED | OUTPATIENT
Start: 2025-06-14 | End: 2025-06-14

## 2025-06-14 RX ORDER — INSULIN LISPRO 100 [IU]/ML
1-8 INJECTION, SOLUTION INTRAVENOUS; SUBCUTANEOUS
Status: DISCONTINUED | OUTPATIENT
Start: 2025-06-14 | End: 2025-06-15 | Stop reason: HOSPADM

## 2025-06-14 RX ORDER — DEXTROSE MONOHYDRATE 25 G/50ML
25 INJECTION, SOLUTION INTRAVENOUS
Status: DISCONTINUED | OUTPATIENT
Start: 2025-06-14 | End: 2025-06-15 | Stop reason: HOSPADM

## 2025-06-14 RX ADMIN — TACROLIMUS 1 MG: 1 CAPSULE ORAL at 16:41

## 2025-06-14 RX ADMIN — AMLODIPINE BESYLATE 10 MG: 5 TABLET ORAL at 05:51

## 2025-06-14 RX ADMIN — INSULIN LISPRO 2 UNITS: 100 INJECTION, SOLUTION INTRAVENOUS; SUBCUTANEOUS at 16:38

## 2025-06-14 RX ADMIN — INSULIN HUMAN 10 UNITS: 1 INJECTION, SOLUTION INTRAVENOUS at 15:41

## 2025-06-14 RX ADMIN — SODIUM CHLORIDE: 9 INJECTION, SOLUTION INTRAVENOUS at 16:47

## 2025-06-14 RX ADMIN — SODIUM CHLORIDE: 9 INJECTION, SOLUTION INTRAVENOUS at 08:37

## 2025-06-14 RX ADMIN — MYCOPHENOLATE MOFETIL 250 MG: 250 CAPSULE ORAL at 05:51

## 2025-06-14 RX ADMIN — AMIODARONE HYDROCHLORIDE 400 MG: 200 TABLET ORAL at 16:42

## 2025-06-14 RX ADMIN — LEVOTHYROXINE SODIUM 88 MCG: 0.09 TABLET ORAL at 05:51

## 2025-06-14 RX ADMIN — APIXABAN 2.5 MG: 2.5 TABLET, FILM COATED ORAL at 05:50

## 2025-06-14 RX ADMIN — PREDNISONE 5 MG: 5 TABLET ORAL at 05:50

## 2025-06-14 RX ADMIN — AMIODARONE HYDROCHLORIDE 400 MG: 200 TABLET ORAL at 05:49

## 2025-06-14 RX ADMIN — DEXTROSE MONOHYDRATE 12.5 G: 25 INJECTION, SOLUTION INTRAVENOUS at 15:36

## 2025-06-14 RX ADMIN — INSULIN LISPRO 3 UNITS: 100 INJECTION, SOLUTION INTRAVENOUS; SUBCUTANEOUS at 20:39

## 2025-06-14 RX ADMIN — SENNOSIDES AND DOCUSATE SODIUM 2 TABLET: 50; 8.6 TABLET ORAL at 16:42

## 2025-06-14 RX ADMIN — APIXABAN 2.5 MG: 2.5 TABLET, FILM COATED ORAL at 16:41

## 2025-06-14 RX ADMIN — CALCIUM GLUCONATE 1 G: 20 INJECTION, SOLUTION INTRAVENOUS at 15:46

## 2025-06-14 RX ADMIN — SODIUM ZIRCONIUM CYCLOSILICATE 10 G: 10 POWDER, FOR SUSPENSION ORAL at 20:35

## 2025-06-14 RX ADMIN — TACROLIMUS 1 MG: 1 CAPSULE ORAL at 05:50

## 2025-06-14 RX ADMIN — MYCOPHENOLATE MOFETIL 250 MG: 250 CAPSULE ORAL at 16:41

## 2025-06-14 RX ADMIN — INSULIN LISPRO 3 UNITS: 100 INJECTION, SOLUTION INTRAVENOUS; SUBCUTANEOUS at 12:36

## 2025-06-14 RX ADMIN — METOPROLOL SUCCINATE 25 MG: 25 TABLET, EXTENDED RELEASE ORAL at 05:50

## 2025-06-14 RX ADMIN — ATORVASTATIN CALCIUM 20 MG: 20 TABLET, FILM COATED ORAL at 16:41

## 2025-06-14 RX ADMIN — INSULIN LISPRO 1 UNITS: 100 INJECTION, SOLUTION INTRAVENOUS; SUBCUTANEOUS at 05:53

## 2025-06-14 ASSESSMENT — ENCOUNTER SYMPTOMS
FOCAL WEAKNESS: 0
HEADACHES: 0
SHORTNESS OF BREATH: 0
FEVER: 0
DEPRESSION: 0
CHILLS: 0
DIARRHEA: 0
HALLUCINATIONS: 0
NAUSEA: 0
WEAKNESS: 0
ABDOMINAL PAIN: 0
DIZZINESS: 0
MYALGIAS: 0
VOMITING: 0
HEARTBURN: 0
COUGH: 0
SORE THROAT: 0
PALPITATIONS: 0
BLOOD IN STOOL: 0
BACK PAIN: 0

## 2025-06-14 ASSESSMENT — PAIN DESCRIPTION - PAIN TYPE
TYPE: ACUTE PAIN
TYPE: ACUTE PAIN

## 2025-06-14 ASSESSMENT — FIBROSIS 4 INDEX: FIB4 SCORE: 2.55

## 2025-06-14 NOTE — PROGRESS NOTES
Renal function panel returned with potassium on 5.9. MD notified, hyperkalemia protocol orders placed.

## 2025-06-14 NOTE — PROGRESS NOTES
Nephrology Daily Progress Note    Date of Service  6/14/2025    Chief Complaint  74 y.o. female admitted 6/11/2025 with atrial fibrillation, status post cardioversion, developed SASHA on CKD stage IIIb    Interval Problem Update  Patient has no chest pain or shortness of breath  She is anxious to go home    Review of Systems  Review of Systems   Constitutional:  Negative for chills, fever and malaise/fatigue.   Respiratory:  Negative for cough and shortness of breath.    Cardiovascular:  Negative for chest pain and leg swelling.   Gastrointestinal:  Negative for nausea and vomiting.   Genitourinary:  Negative for dysuria, frequency and urgency.        Physical Exam  Temp:  [36.1 °C (97 °F)-36.4 °C (97.6 °F)] 36.2 °C (97.2 °F)  Pulse:  [55-66] 66  Resp:  [18-19] 18  BP: (122-134)/(57-78) 134/78  SpO2:  [91 %-96 %] 94 %    Physical Exam  Vitals and nursing note reviewed.   Constitutional:       General: She is awake. She is not in acute distress.     Appearance: Normal appearance. She is well-developed. She is not ill-appearing or diaphoretic.   HENT:      Head: Normocephalic and atraumatic.      Right Ear: External ear normal.      Left Ear: External ear normal.      Nose: Nose normal. No rhinorrhea.      Mouth/Throat:      Pharynx: No oropharyngeal exudate or posterior oropharyngeal erythema.   Eyes:      General: No scleral icterus.        Right eye: No discharge.         Left eye: No discharge.      Conjunctiva/sclera: Conjunctivae normal.   Neck:      Vascular: No carotid bruit.   Cardiovascular:      Rate and Rhythm: Normal rate and regular rhythm.      Heart sounds: No murmur heard.  Pulmonary:      Effort: Pulmonary effort is normal. No respiratory distress.      Breath sounds: Normal breath sounds.   Abdominal:      General: Abdomen is flat. There is no distension.      Palpations: Abdomen is soft. There is no mass.   Musculoskeletal:         General: No tenderness.      Cervical back: No rigidity. No muscular  "tenderness.      Right lower leg: No edema.      Left lower leg: No edema.   Skin:     General: Skin is warm and dry.      Coloration: Skin is not jaundiced.      Findings: No erythema.   Neurological:      General: No focal deficit present.      Mental Status: She is alert and oriented to person, place, and time. Mental status is at baseline.      Cranial Nerves: No cranial nerve deficit.   Psychiatric:         Mood and Affect: Mood normal.         Behavior: Behavior normal.         Thought Content: Thought content normal.         Fluids    Intake/Output Summary (Last 24 hours) at 6/14/2025 1130  Last data filed at 6/14/2025 0911  Gross per 24 hour   Intake 240 ml   Output --   Net 240 ml       Laboratory  Recent Labs     06/12/25  0248 06/13/25  0126   WBC 4.2* 4.3*   RBC 4.54 4.96   HEMOGLOBIN 12.5 13.5   HEMATOCRIT 39.5 43.2   MCV 87.0 87.1   MCH 27.5 27.2   MCHC 31.6* 31.3*   RDW 42.8 43.5   PLATELETCT 112* 113*   MPV 12.0 11.9     Recent Labs     06/12/25  0248 06/13/25  0126 06/14/25  0132   SODIUM 133* 134* 128*   POTASSIUM 5.0 5.2 5.7*   CHLORIDE 103 103 97   CO2 17* 17* 16*   GLUCOSE 284* 255* 196*   BUN 36* 38* 50*   CREATININE 1.68* 2.07* 2.12*   CALCIUM 9.5 9.8 9.6         No results for input(s): \"NTPROBNP\" in the last 72 hours.        Imaging  EC-VELMA W/O CONT   Final Result      EC-ECHOCARDIOGRAM COMPLETE W/O CONT   Final Result      DX-CHEST-PORTABLE (1 VIEW)   Final Result      Cardiomegaly.      CL-CARDIOVERSION    (Results Pending)         Assessment/Plan  1 SASHA on CKD stage IIIb: Secondary to volume depletion  2 hyperkalemia  3 hyponatremia  4 kidney transplant  no acute need for HD  Tacrolimus trough level is pending  Gentle IV fluid challenge  Free water restriction  Serial sodium level check  Low potassium diet  Renal diet  Repeat potassium level this afternoon  Renal dose all meds  Avoid nephrotoxins like NSAIDs.  If potassium and sodium levels improve this afternoon then okay for the patient " to discharge home and follow-up as an outpatient  Plan discussed with Dr. Bond

## 2025-06-14 NOTE — ASSESSMENT & PLAN NOTE
Potassium continues to rise  Associated with hyperglycemia  IV insulin x 1, lower dose of dextrose

## 2025-06-14 NOTE — PROGRESS NOTES
..Telemetry Shift Summary     Rhythm: SR/SB  HR Range:51-60  Ectopy: RPAC  Measurements: 0.20/0.12/0.48    Normal Values  Measurements: 0.12- 0.20 / 0.08-0.10 / 0.30-0.52

## 2025-06-14 NOTE — PROGRESS NOTES
Hospital Medicine Daily Progress Note    Date of Service  6/14/2025    Chief Complaint  Tere Gallegos is a 74 y.o. female admitted 6/11/2025 with afib with rvr    Hospital Course  History of atrial fibrillation with a planned outpatient stress test admitted for A-fib with RVR after holding metoprolol in preparation for stress test.  She remains on anticoagulation but rate control was unable to be obtained with metoprolol IV x 1, diltiazem IV x 2.  Cardiology is planning to do a VELMA with cardioversion    Interval Problem Update  6/12: Heart rate is in the 70s this morning, dilt drip.  Denies palpitations.    6/13: Heart rate is stable in sinus in the 60s to 70s.  Tolerating metoprolol.  Creatinine has increased slightly to 2.0 today.  Hold Lasix.  I discussed with nephrology given that she is a transplant patient    6/14: Cr rising slightly.  K is 5.7.  IVF given, repeat K is 5.9.  Unable to DC.  IV insulin, Ca Gluconate    I have discussed this patient's plan of care and discharge plan at IDT rounds today with Case Management, Nursing, Nursing leadership, and other members of the IDT team.    Consultants/Specialty  Cardiology    Code Status  Full Code    Disposition  The patient is not medically cleared for discharge to home or a post-acute facility.  Anticipate discharge to: home with close outpatient follow-up    I have placed the appropriate orders for post-discharge needs.    Review of Systems  Review of Systems   Constitutional:  Negative for chills, fever and malaise/fatigue.   HENT:  Negative for sore throat.    Respiratory:  Negative for cough and shortness of breath.    Cardiovascular:  Negative for chest pain and palpitations.   Gastrointestinal:  Negative for abdominal pain, blood in stool, diarrhea, heartburn, nausea and vomiting.   Genitourinary:  Negative for dysuria and frequency.   Musculoskeletal:  Negative for back pain and myalgias.   Neurological:  Negative for dizziness, focal  weakness, weakness and headaches.   Psychiatric/Behavioral:  Negative for depression and hallucinations.    All other systems reviewed and are negative.       Physical Exam  Temp:  [36.1 °C (97 °F)-36.4 °C (97.6 °F)] 36.2 °C (97.2 °F)  Pulse:  [55-66] 66  Resp:  [18-19] 18  BP: (122-134)/(57-78) 134/78  SpO2:  [91 %-96 %] 94 %    Physical Exam  Constitutional:       General: She is not in acute distress.  HENT:      Nose: Nose normal.      Mouth/Throat:      Mouth: Mucous membranes are dry.   Cardiovascular:      Rate and Rhythm: Normal rate and regular rhythm.      Pulses: Normal pulses.      Comments: Rate has improved  Pulmonary:      Effort: Pulmonary effort is normal.      Breath sounds: Normal breath sounds.   Abdominal:      General: There is no distension.      Palpations: Abdomen is soft.   Musculoskeletal:         General: No swelling.      Cervical back: No muscular tenderness.   Lymphadenopathy:      Cervical: No cervical adenopathy.   Skin:     General: Skin is warm and dry.      Coloration: Skin is not pale.      Findings: No rash.   Neurological:      General: No focal deficit present.      Mental Status: She is alert and oriented to person, place, and time.      Motor: Weakness present.   Psychiatric:         Mood and Affect: Mood normal.         Thought Content: Thought content normal.         Fluids    Intake/Output Summary (Last 24 hours) at 6/14/2025 1531  Last data filed at 6/14/2025 0911  Gross per 24 hour   Intake 240 ml   Output --   Net 240 ml        Laboratory  Recent Labs     06/12/25  0248 06/13/25  0126   WBC 4.2* 4.3*   RBC 4.54 4.96   HEMOGLOBIN 12.5 13.5   HEMATOCRIT 39.5 43.2   MCV 87.0 87.1   MCH 27.5 27.2   MCHC 31.6* 31.3*   RDW 42.8 43.5   PLATELETCT 112* 113*   MPV 12.0 11.9     Recent Labs     06/13/25  0126 06/14/25  0132 06/14/25  1326   SODIUM 134* 128* 129*   POTASSIUM 5.2 5.7* 5.9*   CHLORIDE 103 97 97   CO2 17* 16* 16*   GLUCOSE 255* 196* 305*   BUN 38* 50* 60*    CREATININE 2.07* 2.12* 2.09*   CALCIUM 9.8 9.6 9.7                   Imaging  EC-VELMA W/O CONT   Final Result      EC-ECHOCARDIOGRAM COMPLETE W/O CONT   Final Result      DX-CHEST-PORTABLE (1 VIEW)   Final Result      Cardiomegaly.      CL-CARDIOVERSION    (Results Pending)        Assessment/Plan  * Atrial fibrillation with rapid ventricular response (HCC)- (present on admission)  Assessment & Plan  Patient was scheduled for an outpatient stress test and with this the patient's metoprolol was held for 3 days.  Because of this she went into atrial fibrillation with rapid ventricular response.  Dilt drip 6/11-6/12  Cardioversion on 6/12 was successful, she remains in normal sinus rhythm  Continue to metoprolol XL 25 daily  Continue Eliquis 2.5 twice daily   Reschedule outpatient stress test    CKD (chronic kidney disease) stage 4, GFR 15-29 ml/min ([facility])- (present on admission)  Assessment & Plan  Creatinine is rising, likely from RVR and procedure being NPO  Discussed with Dr. Najjar - he recommends holding lasix  Hold lasix  Repeat in a.m.    Immunosuppressive management encounter following kidney transplant- (present on admission)  Assessment & Plan  Continue tacrolimus, CellCept, prednisone    GERD (gastroesophageal reflux disease)- (present on admission)  Assessment & Plan  Currently denies any heartburn    Hypothyroidism, acquired- (present on admission)  Assessment & Plan  Continue Synthroid 88 mcg daily  TSH 0.625    Chronic heart failure with preserved ejection fraction (HCC)- (present on admission)  Assessment & Plan  Most recent left ventricular ejection fraction is 65%  Continue metoprolol    Coronary artery disease with angina pectoris with documented spasm ([facility])- (present on admission)  Assessment & Plan  Patient has 2 stents in place to the RCA  Continue with metoprolol, Lipitor, Eliquis    Mixed hyperlipidemia- (present on admission)  Assessment & Plan  Low-fat low-cholesterol diet  Continue with  Lipitor 20 mg nightly  Fasting lipid panel    Type 2 diabetes mellitus with stage 4 chronic kidney disease, with long-term current use of insulin (HCC)- (present on admission)  Assessment & Plan  -accus with sliding scale coverage, continue Lantus and 30 units in the morning and 20 units at night  -diabetic diet  -diabetic education  -follow glycohemoglobin levels long term, very poorly controlled most recent hemoglobin A1c is 11.4  -monitor for hypoglycemic episodes and adjust control if he should get low    Primary hypertension- (present on admission)  Assessment & Plan  I will optimize blood pressure management keep systolic blood pressure less than 140 diastolic under 90  Resume Norvasc 10 mg daily, metoprolol XL 25 mg daily as needed hydralazine         VTE prophylaxis: Eliquis    I have performed a physical exam and reviewed and updated ROS and Plan today (6/14/2025). In review of yesterday's note (6/13/2025), there are no changes except as documented above.    Total time:  49 minutes.  I spent greater than 50% of the time for patient care, counseling, and coordination on this date, including unit/floor time, and face-to-face time with the patient as per interval events and assessment and plan above

## 2025-06-14 NOTE — DISCHARGE PLANNING
" note:  Met with pt and called  [facility] # [id number]. Explained that per MD, possible dc today or tomorrow. Explained IMM. Pt said \" she is ok to go home\" and pt signed the IMM as explained by .   "

## 2025-06-14 NOTE — CARE PLAN
The patient is Stable - Low risk of patient condition declining or worsening    Shift Goals  Clinical Goals: Monitor O2, safety  Patient Goals: Rest, comfort  Family Goals: TEE    Progress made toward(s) clinical / shift goals:    Problem: Pain - Standard  Goal: Alleviation of pain or a reduction in pain to the patient’s comfort goal  Outcome: Progressing  Note: Patient has been able to verbalize pain through . Pain has been controlled via non-pharmacologic interventions. Patient reports tramadol being effective in reducing pain.        Patient is not progressing towards the following goals:

## 2025-06-14 NOTE — PROGRESS NOTES
Bedside report received from dayshift RN. Patient is sitting edge of the bed watching tv. Patient requires  for communication. Patient denies needs at this time. Bed is locked and in lowest position. Call light within reach.

## 2025-06-14 NOTE — PROGRESS NOTES
Telemetry Shift Summary      Rhythm: SR/SB w/ 1st degree   HR Range: 56-67  Ectopy: none   Measurements: .21/.07/.47       Normal Values  Measurements: 0.12- 0.20 / 0.08-0.10 / 0.30-0.52

## 2025-06-14 NOTE — ASSESSMENT & PLAN NOTE
Potassium continues to rise, associated with increasing Cr with CKD, transplanted solitary kidne  Associated with hyperglycemia  IV insulin x 1, lower dose of dextrose  Ca Gluc IV  Repeat is 5.9  Repeat at 1900

## 2025-06-15 ENCOUNTER — PHARMACY VISIT (OUTPATIENT)
Dept: PHARMACY | Facility: MEDICAL CENTER | Age: 75
End: 2025-06-15
Payer: COMMERCIAL

## 2025-06-15 VITALS
OXYGEN SATURATION: 94 % | SYSTOLIC BLOOD PRESSURE: 148 MMHG | RESPIRATION RATE: 18 BRPM | WEIGHT: 133.16 LBS | HEART RATE: 65 BPM | TEMPERATURE: 97.6 F | HEIGHT: 60 IN | DIASTOLIC BLOOD PRESSURE: 64 MMHG | BODY MASS INDEX: 26.14 KG/M2

## 2025-06-15 LAB
ALBUMIN SERPL BCP-MCNC: 3.8 G/DL (ref 3.2–4.9)
ANION GAP SERPL CALC-SCNC: 13 MMOL/L (ref 7–16)
BUN SERPL-MCNC: 62 MG/DL (ref 8–22)
CALCIUM ALBUM COR SERPL-MCNC: 10 MG/DL (ref 8.5–10.5)
CALCIUM SERPL-MCNC: 9.8 MG/DL (ref 8.5–10.5)
CHLORIDE SERPL-SCNC: 104 MMOL/L (ref 96–112)
CO2 SERPL-SCNC: 15 MMOL/L (ref 20–33)
CREAT SERPL-MCNC: 1.88 MG/DL (ref 0.5–1.4)
ERYTHROCYTE [DISTWIDTH] IN BLOOD BY AUTOMATED COUNT: 43.6 FL (ref 35.9–50)
GFR SERPLBLD CREATININE-BSD FMLA CKD-EPI: 27 ML/MIN/1.73 M 2
GLUCOSE BLD STRIP.AUTO-MCNC: 207 MG/DL (ref 65–99)
GLUCOSE BLD STRIP.AUTO-MCNC: 52 MG/DL (ref 65–99)
GLUCOSE BLD STRIP.AUTO-MCNC: 60 MG/DL (ref 65–99)
GLUCOSE BLD STRIP.AUTO-MCNC: 86 MG/DL (ref 65–99)
GLUCOSE SERPL-MCNC: 63 MG/DL (ref 65–99)
HCT VFR BLD AUTO: 37.9 % (ref 37–47)
HGB BLD-MCNC: 12.1 G/DL (ref 12–16)
MAGNESIUM SERPL-MCNC: 2.1 MG/DL (ref 1.5–2.5)
MCH RBC QN AUTO: 27.9 PG (ref 27–33)
MCHC RBC AUTO-ENTMCNC: 31.9 G/DL (ref 32.2–35.5)
MCV RBC AUTO: 87.3 FL (ref 81.4–97.8)
PHOSPHATE SERPL-MCNC: 3.5 MG/DL (ref 2.5–4.5)
PLATELET # BLD AUTO: 100 K/UL (ref 164–446)
PMV BLD AUTO: 11.6 FL (ref 9–12.9)
POTASSIUM SERPL-SCNC: 5.1 MMOL/L (ref 3.6–5.5)
RBC # BLD AUTO: 4.34 M/UL (ref 4.2–5.4)
SODIUM SERPL-SCNC: 132 MMOL/L (ref 135–145)
WBC # BLD AUTO: 7.6 K/UL (ref 4.8–10.8)

## 2025-06-15 PROCEDURE — 85027 COMPLETE CBC AUTOMATED: CPT

## 2025-06-15 PROCEDURE — A9270 NON-COVERED ITEM OR SERVICE: HCPCS | Performed by: STUDENT IN AN ORGANIZED HEALTH CARE EDUCATION/TRAINING PROGRAM

## 2025-06-15 PROCEDURE — 99239 HOSP IP/OBS DSCHRG MGMT >30: CPT | Performed by: INTERNAL MEDICINE

## 2025-06-15 PROCEDURE — 94760 N-INVAS EAR/PLS OXIMETRY 1: CPT

## 2025-06-15 PROCEDURE — A9270 NON-COVERED ITEM OR SERVICE: HCPCS | Performed by: HOSPITALIST

## 2025-06-15 PROCEDURE — A9270 NON-COVERED ITEM OR SERVICE: HCPCS | Performed by: NURSE PRACTITIONER

## 2025-06-15 PROCEDURE — 700102 HCHG RX REV CODE 250 W/ 637 OVERRIDE(OP): Performed by: STUDENT IN AN ORGANIZED HEALTH CARE EDUCATION/TRAINING PROGRAM

## 2025-06-15 PROCEDURE — 82962 GLUCOSE BLOOD TEST: CPT | Performed by: INTERNAL MEDICINE

## 2025-06-15 PROCEDURE — 700111 HCHG RX REV CODE 636 W/ 250 OVERRIDE (IP): Performed by: HOSPITALIST

## 2025-06-15 PROCEDURE — RXMED WILLOW AMBULATORY MEDICATION CHARGE: Performed by: INTERNAL MEDICINE

## 2025-06-15 PROCEDURE — 700102 HCHG RX REV CODE 250 W/ 637 OVERRIDE(OP): Performed by: NURSE PRACTITIONER

## 2025-06-15 PROCEDURE — 36415 COLL VENOUS BLD VENIPUNCTURE: CPT

## 2025-06-15 PROCEDURE — 80069 RENAL FUNCTION PANEL: CPT

## 2025-06-15 PROCEDURE — 700102 HCHG RX REV CODE 250 W/ 637 OVERRIDE(OP): Performed by: HOSPITALIST

## 2025-06-15 PROCEDURE — 83735 ASSAY OF MAGNESIUM: CPT

## 2025-06-15 RX ORDER — INSULIN GLARGINE 100 [IU]/ML
20-30 INJECTION, SOLUTION SUBCUTANEOUS 2 TIMES DAILY
Qty: 18 ML | Refills: 0 | Status: ACTIVE | OUTPATIENT
Start: 2025-06-15

## 2025-06-15 RX ORDER — AMIODARONE HYDROCHLORIDE 400 MG/1
400 TABLET ORAL 2 TIMES DAILY
Qty: 30 TABLET | Refills: 1 | Status: SHIPPED | OUTPATIENT
Start: 2025-06-15

## 2025-06-15 RX ADMIN — AMIODARONE HYDROCHLORIDE 400 MG: 200 TABLET ORAL at 05:59

## 2025-06-15 RX ADMIN — AMLODIPINE BESYLATE 10 MG: 5 TABLET ORAL at 06:00

## 2025-06-15 RX ADMIN — Medication 5 MG: at 01:15

## 2025-06-15 RX ADMIN — APIXABAN 2.5 MG: 2.5 TABLET, FILM COATED ORAL at 06:00

## 2025-06-15 RX ADMIN — MYCOPHENOLATE MOFETIL 250 MG: 250 CAPSULE ORAL at 06:01

## 2025-06-15 RX ADMIN — PREDNISONE 5 MG: 5 TABLET ORAL at 06:00

## 2025-06-15 RX ADMIN — LEVOTHYROXINE SODIUM 88 MCG: 0.09 TABLET ORAL at 06:00

## 2025-06-15 RX ADMIN — TACROLIMUS 1 MG: 1 CAPSULE ORAL at 06:01

## 2025-06-15 ASSESSMENT — FIBROSIS 4 INDEX: FIB4 SCORE: 2.55

## 2025-06-15 ASSESSMENT — PAIN DESCRIPTION - PAIN TYPE: TYPE: ACUTE PAIN

## 2025-06-15 NOTE — CARE PLAN
The patient is Stable - Low risk of patient condition declining or worsening    Shift Goals  Clinical Goals: Monitor labs/HR  Patient Goals: Go home  Family Goals: TEE    Progress made toward(s) clinical / shift goals:    Problem: Knowledge Deficit - Standard  Goal: Patient and family/care givers will demonstrate understanding of plan of care, disease process/condition, diagnostic tests and medications  Description: Target End Date:  1-3 days or as soon as patient condition allows    Document in Patient Education    1.  Patient and family/caregiver oriented to unit, equipment, visitation policy and means for communicating concern  2.  Complete/review Learning Assessment  3.  Assess knowledge level of disease process/condition, treatment plan, diagnostic tests and medications  4.  Explain disease process/condition, treatment plan, diagnostic tests and medications  Outcome: Progressing     Problem: Pain - Standard  Goal: Alleviation of pain or a reduction in pain to the patient’s comfort goal  Description: Target End Date:  Prior to discharge or change in level of care    Document on Vitals flowsheet    1.  Document pain using the appropriate pain scale per order or unit policy  2.  Educate and implement non-pharmacologic comfort measures (i.e. relaxation, distraction, massage, cold/heat therapy, etc.)  3.  Pain management medications as ordered  4.  Reassess pain after pain med administration per policy  5.  If opiods administered assess patient's response to pain medication is appropriate per POSS sedation scale  6.  Follow pain management plan developed in collaboration with patient and interdisciplinary team (including palliative care or pain specialists if applicable)  Outcome: Progressing       Patient is not progressing towards the following goals:

## 2025-06-15 NOTE — PROGRESS NOTES
Discharge paperwork reviewed with patient including medications, follow up care, and to return with any worsening symptoms. Patient A&O x4, verbalizes understanding. IV's dc'd without complication, telebox removed. All belongings with patient, transported out of hospital to relatives personal vehicle via wheelchair by this RN.

## 2025-06-15 NOTE — PROGRESS NOTES
Telemetry Shift Summary     Rhythm SR/SB  HR Range 55-61  Ectopy rPVC  Measurements  0.20/0.09/0.43     Normal Values  Rhythm SR  HR Range    Measurements 0.12-0.20 / 0.06-0.10  / 0.30-0.52

## 2025-06-15 NOTE — PROGRESS NOTES
Bedside report received from TRAVIS Laguna. Pt A&O 4, currently denies pain, no difficulties with ambulation. Pt resting in bed, states no further needs at this time. Bed in lowest, locked position with call light within reach.

## 2025-06-15 NOTE — DISCHARGE SUMMARY
Discharge Summary    CHIEF COMPLAINT ON ADMISSION  Chief Complaint   Patient presents with    Rapid Heart Beat     Was at Cardiology today   was told HR was fast   Hx of cardiac issues   pt and son unsure why there were sent here         Reason for Admission  Rapid Heart Rate     Admission Date  6/11/2025    CODE STATUS  FULL    HPI & HOSPITAL COURSE  This is a 74 y.o. female with a History of atrial fibrillation with a planned outpatient stress test admitted for A-fib with RVR after holding metoprolol in preparation for stress test.  She remains on anticoagulation but rate control was unable to be obtained with metoprolol IV x 1, diltiazem IV x 2.  Cardiology performed a VELMA cardioversion on 6/12/2025 and restored normal sinus rhythm.    Following her cardioversion she did have a slight increase in her creatinine up to 2.08.  Given that she has a transplanted solitary kidney, she was monitored until her creatinine returned to baseline.  She had mild hyperkalemia which was treated with IV insulin and calcium gluconate.    Her Lasix were held and she will need her creatinine repeated prior to resuming this in approximately 1 week.  Electrolytes were stable on the day of discharge.    She will need an outpatient stress test to be rescheduled    Therefore, she is discharged in good and stable condition to home with close outpatient follow-up.    The patient met 2-midnight criteria for an inpatient stay at the time of discharge.    Discharge Date  6/15/2025    FOLLOW UP ITEMS POST DISCHARGE  Follow-up with PCP, cardiology or nephrology in approximately 1 week to have labs repeated prior to resuming Lasix    DISCHARGE DIAGNOSES  Principal Problem:    Atrial fibrillation with rapid ventricular response (HCC) (POA: Yes)  Active Problems:    Primary hypertension (Chronic) (POA: Yes)      Overview: Patient has a history of hypertension.  She is unclear as to which       medicine she is supposed to take but does note she  takes levothyroxine for       the thyroid, amiodarone for history of atrial fibrillation and amlodipine       10 mg daily for hypertension.  Was having an episode of elevated blood       pressure today 170s over 90s so she came in    Type 2 diabetes mellitus with stage 4 chronic kidney disease, with long-term current use of insulin (HCC) (POA: Yes)      Overview: Patient is only on pioglitazone for her diabetes. She used to follow with       endocrinology but has not visited since December 2021    Mixed hyperlipidemia (POA: Yes)    Coronary artery disease with angina pectoris with documented spasm (HCC) (POA: Yes)      Overview: 2 Synergy NOEMI to 100% RCA stent placed    Chronic heart failure with preserved ejection fraction (HCC) (POA: Yes)    Hypothyroidism, acquired (POA: Yes)    GERD (gastroesophageal reflux disease) (POA: Yes)    Immunosuppressive management encounter following kidney transplant (Chronic) (POA: Yes)    Hyperkalemia (POA: Yes)    CKD (chronic kidney disease) stage 4, GFR 15-29 ml/min ([facility]) (POA: Yes)  Resolved Problems:    RLS (restless legs syndrome) (POA: Yes)      FOLLOW UP  Future Appointments   Date Time Provider Department Center   6/25/2025 11:20 AM ANGELITA Concepcion   7/7/2025  3:15 PM ANGELITA Cannon None   7/16/2025 10:00 AM [place] [facility] JASSON Vinson   7/23/2025 11:20 AM ANGELITA Concepcion   8/15/2025  2:45 PM ANGELITA Ortega None   8/20/2025 11:20 AM ANGELITA Concepcion   8/20/2025  3:40 PM ANGELITA Avalos   8/26/2025  3:00 PM Priyank Villar M.D. NEPH [address] [place] [address]   9/17/2025 11:20 AM ANGELITA Concepcion [facility] Suzi Dutta   10/15/2025 11:20 AM ANGELITA Concepcion [facility] Suzi Luzmaria   10/17/2025  4:00 PM [facility] EXAM 5 VMED None   11/12/2025 11:20 AM ANGELITA Concepcion ACUP Suzi Dutta   11/19/2025  4:00 PM Damon Crawley,  CHAPO LAURENT None   12/10/2025 11:20 AM ANGELITA Concepcionrrmaksim Dutta   1/14/2026 10:20 AM ANGELITA Concepcion Suzi Dutta         MEDICATIONS ON DISCHARGE     Medication List        PAUSE taking these medications        Instructions   furosemide 80 MG Tabs  Wait to take this until: June 23, 2025  Commonly known as: Lasix   Take 1 Tablet by mouth every day.  Dose: 80 mg            START taking these medications        Instructions   amiodarone 400 MG tablet  Commonly known as: Pacerone   [place] 1 tableta por vía oral 2 veces al día.  (Take 1 Tablet by mouth 2 times a day.)  Dose: 400 mg            CHANGE how you take these medications        Instructions   * BD Pen Needle Yasmeen 2nd Gen  What changed: Another medication with the same name was added. Make sure you understand how and when to take each.  Generic drug: Insulin Pen Needle 32 G x 4 mm   USE AS DIRECTED WITH INSULIN PENS SIX TIMES DAILY     * Insulin Pen Needle 32 G x 4 mm  What changed: You were already taking a medication with the same name, and this prescription was added. Make sure you understand how and when to take each.   Doctor's comments: Per patient/formulary preference. ICD-10 code: E11.65 Uncontrolled type 2 Diabetes Mellitus  Use one pen needle in pen device to inject insulin three times daily.     Farxiga 5 MG Tabs  What changed: See the new instructions.  Generic drug: dapagliflozin propanediol   TOME 1 TABLETA POR VIA ORAL TODOS LOS MCCORMICK FOR DIABETES           * This list has 2 medication(s) that are the same as other medications prescribed for you. Read the directions carefully, and ask your doctor or other care provider to review them with you.                CONTINUE taking these medications        Instructions   Accu-Chek Guide strip  Generic drug: glucose blood   USE ONE COVERED STRIP TO TEST BLOOD SUGAR THREE TIMES DAILY.     amLODIPine 10 MG Tabs  Commonly known as: Norvasc   Take 10 mg by mouth every day. Per [facility]  last filled 12/2023, there is a prescription there wait to be picked up (6/11/2025)  Dose: 10 mg     apixaban 2.5mg Tabs  Commonly known as: Eliquis   Doctor's comments: This Rx has been ordered by a pharmacist working under a collaborative practice agreement.  Take 1 Tablet by mouth 2 times a day. TOME 1 TABLETA POR VIA ORAL DOS VECES AL DESMOND  Dose: 2.5 mg     atorvastatin 20 MG Tabs  Commonly known as: Lipitor   TOME 1 TABLETA POR VIA ORAL TODOS LOS MCCORMICK EN LA NOCHE     FreeStyle Pearl 3 Kendrick Rosmery   Use 1 each continuously.  Dose: 1 Each     * FreeStyle Pearl 3 Sensor Misc   1 Each every 14 days.  Dose: 1 Each     * FreeStyle Pearl 3 Plus Sensor Misc   Apply every 15 days     insulin aspart 100 UNIT/ML Soln  Commonly known as: NovoLOG   Inject 20 Units under the skin 3 times a day before meals. Per [facility] pt is to take 20 units TID with meals   No sliding scale or carb counting  Dose: 20 Units     Lancets   Doctor's comments: Or per formulary preference. ICD-10 code: E11.65 Uncontrolled type 2 Diabetes Mellitus  Use one covered lancet to test blood sugar three times daily.     Lantus SoloStar 100 UNIT/ML Sopn injection  Generic drug: insulin glargine   Inject 20-30 Units under the skin 2 times a day. Pt takes 30 units AM and 20 units in the evening  Dose: 20-30 Units     levothyroxine 88 MCG Tabs  Commonly known as: Synthroid   Take 1 Tablet by mouth every morning on an empty stomach.  Dose: 88 mcg     metoprolol SR 25 MG Tb24  Commonly known as: Toprol XL   TOME 1 TABLETA POR VIA ORAL TODOS LOS MCCORMICK  Dose: 25 mg     mycophenolate 250 MG Caps  Commonly known as: Cellcept   Take 250 mg by mouth 2 times a day.  Dose: 250 mg     predniSONE 5 MG Tabs  Commonly known as: Deltasone   Take 5 mg by mouth every day.  Dose: 5 mg     tacrolimus 1 MG Caps  Commonly known as: Prograf   Take 1 mg by mouth 2 times a day.  Dose: 1 mg     vitamin D2 1.25 mg (38980 Units) Caps capsule  Commonly known as: Ergocalciferol   Take 1  Capsule by mouth every 7 days.  Dose: 50,000 Units           * This list has 2 medication(s) that are the same as other medications prescribed for you. Read the directions carefully, and ask your doctor or other care provider to review them with you.                  Allergies  Allergies[1]    DIET  Renal, diabetic, cardiac    ACTIVITY  As tolerated.  Weight bearing as tolerated    CONSULTATIONS  Dr. Pettit -cardiology  Dr. Najjar -nephrology    PROCEDURES  6/12/2025: VELMA cardioversion    LABORATORY  Lab Results   Component Value Date    SODIUM 132 (L) 06/15/2025    POTASSIUM 5.1 06/15/2025    CHLORIDE 104 06/15/2025    CO2 15 (L) 06/15/2025    GLUCOSE 63 (L) 06/15/2025    BUN 62 (H) 06/15/2025    CREATININE 1.88 (H) 06/15/2025        Lab Results   Component Value Date    WBC 7.6 06/15/2025    HEMOGLOBIN 12.1 06/15/2025    HEMATOCRIT 37.9 06/15/2025    PLATELETCT 100 (L) 06/15/2025        Total time of the discharge process exceeds 39 minutes.       [1] No Known Allergies

## 2025-06-15 NOTE — PROGRESS NOTES
Upon med pass, patient had a BG 52, initiated protocol to raise sugars with juice and rossy crackers. Rechecked 15 minutes later: BG 60. MD notified. Glargine Insulin rescheduled for 8am, per MD. Rechecked after another 15 minutes: BG 86. Patient verbalized feeling better and appears to be back to baseline. Will monitor closely.

## 2025-06-15 NOTE — CARE PLAN
The patient is Stable - Low risk of patient condition declining or worsening    Shift Goals  Clinical Goals: Monitor labs and BG  Patient Goals: Rest, comfort  Family Goals: TEE    Progress made toward(s) clinical / shift goals:    Problem: Knowledge Deficit - Standard  Goal: Patient and family/care givers will demonstrate understanding of plan of care, disease process/condition, diagnostic tests and medications  Outcome: Progressing  Note: Patient verbalized understanding of progression of care and treatment plan via . Patient shows involvement of her care and willingness to improve health.        Patient is not progressing towards the following goals:

## 2025-06-15 NOTE — CARE PLAN
The patient is Stable - Low risk of patient condition declining or worsening    Shift Goals  Clinical Goals: Monitor HR/rhythm, safety  Patient Goals: Rest, comfort  Family Goals: TEE    Progress made toward(s) clinical / shift goals:      Problem: Knowledge Deficit - Standard  Goal: Patient and family/care givers will demonstrate understanding of plan of care, disease process/condition, diagnostic tests and medications  Outcome: Progressing     Problem: Pain - Standard  Goal: Alleviation of pain or a reduction in pain to the patient’s comfort goal  Outcome: Progressing     Problem: Cardiac - Atrial Fibrillation  Goal: Patient will achieve & maintain adequate cardiac output and rate control  Outcome: Progressing       Patient is not progressing towards the following goals:

## 2025-06-16 ENCOUNTER — PATIENT OUTREACH (OUTPATIENT)
Dept: MEDICAL GROUP | Facility: MEDICAL CENTER | Age: 75
End: 2025-06-16
Payer: MEDICARE

## 2025-06-16 ENCOUNTER — DOCUMENTATION (OUTPATIENT)
Dept: HEALTH INFORMATION MANAGEMENT | Facility: OTHER | Age: 75
End: 2025-06-16
Payer: MEDICARE

## 2025-06-17 ENCOUNTER — TELEPHONE (OUTPATIENT)
Dept: HEALTH INFORMATION MANAGEMENT | Facility: OTHER | Age: 75
End: 2025-06-17
Payer: MEDICARE

## 2025-06-24 ENCOUNTER — TELEPHONE (OUTPATIENT)
Dept: HEALTH INFORMATION MANAGEMENT | Facility: OTHER | Age: 75
End: 2025-06-24
Payer: MEDICARE

## 2025-06-25 ENCOUNTER — OFFICE VISIT (OUTPATIENT)
Dept: MEDICAL GROUP | Facility: IMAGING CENTER | Age: 75
End: 2025-06-25
Payer: MEDICARE

## 2025-06-25 VITALS
BODY MASS INDEX: 23.59 KG/M2 | OXYGEN SATURATION: 94 % | HEIGHT: 63 IN | DIASTOLIC BLOOD PRESSURE: 48 MMHG | HEART RATE: 43 BPM | TEMPERATURE: 97.8 F | SYSTOLIC BLOOD PRESSURE: 106 MMHG

## 2025-06-25 DIAGNOSIS — N18.4 ANEMIA DUE TO STAGE 4 CHRONIC KIDNEY DISEASE (HCC): ICD-10-CM

## 2025-06-25 DIAGNOSIS — H91.93 BILATERAL HEARING LOSS, UNSPECIFIED HEARING LOSS TYPE: ICD-10-CM

## 2025-06-25 DIAGNOSIS — Z79.4 TYPE 2 DIABETES MELLITUS WITH STAGE 4 CHRONIC KIDNEY DISEASE, WITH LONG-TERM CURRENT USE OF INSULIN (HCC): Primary | ICD-10-CM

## 2025-06-25 DIAGNOSIS — D63.1 ANEMIA DUE TO STAGE 4 CHRONIC KIDNEY DISEASE (HCC): ICD-10-CM

## 2025-06-25 DIAGNOSIS — R53.1 WEAKNESS: ICD-10-CM

## 2025-06-25 DIAGNOSIS — N18.4 STAGE 4 CHRONIC KIDNEY DISEASE (HCC): ICD-10-CM

## 2025-06-25 DIAGNOSIS — E11.22 TYPE 2 DIABETES MELLITUS WITH STAGE 4 CHRONIC KIDNEY DISEASE, WITH LONG-TERM CURRENT USE OF INSULIN (HCC): Primary | ICD-10-CM

## 2025-06-25 DIAGNOSIS — L60.0 INGROWN TOENAIL OF BOTH FEET: ICD-10-CM

## 2025-06-25 DIAGNOSIS — B35.1 ONYCHOMYCOSIS OF MULTIPLE TOENAILS WITH TYPE 2 DIABETES MELLITUS (HCC): ICD-10-CM

## 2025-06-25 DIAGNOSIS — E11.69 ONYCHOMYCOSIS OF MULTIPLE TOENAILS WITH TYPE 2 DIABETES MELLITUS (HCC): ICD-10-CM

## 2025-06-25 DIAGNOSIS — N18.4 TYPE 2 DIABETES MELLITUS WITH STAGE 4 CHRONIC KIDNEY DISEASE, WITH LONG-TERM CURRENT USE OF INSULIN (HCC): Primary | ICD-10-CM

## 2025-06-25 NOTE — PROGRESS NOTES
Subjective:     Tere Gallegos is a 74 y.o. female who presents for Hospital Follow-up. She is accompanied by her son, virtual  in use    HPI:     History of Present Illness  The patient was admitted to [facility] from 06/11/2025 through 06/15/2025. She was sent to the ER by her cardiologist for a rapid heart rate and was found to be in atrial fibrillation with rapid ventricular response (A-fib with RVR). She had a planned outpatient stress test and was holding her metoprolol in preparation for it. However, she converted to A-fib with RVR and was therefore sent to the ER. Her heart rate could not be controlled with IV metoprolol or diltiazem, so cardiology performed a VELMA cardioversion on 06/12/2025, which restored normal sinus rhythm. Following cardioversion, her creatinine increased to 2.08 but eventually returned to baseline. She also had mild hyperkalemia, which was treated with IV insulin and calcium gluconate. Her Lasix was held due to the elevated creatinine, and she was advised to repeat labs prior to resuming Lasix within a week of discharge. She needs to reschedule her outpatient stress test. She has a follow-up appointment with the cardiology nurse practitioner in a week and a half and has a cardiac event monitor ordered, which will be placed on 07/17/2025. She is accompanied by her son.    Since her discharge, she has been experiencing weakness, particularly when walking, which has led to falls. This symptom was not present during her hospital stay. She reports difficulty in ambulation and has not undergone any blood tests since her discharge. She also mentions persistent back pain and fatigue. She has been adhering to her medication regimen.    She has been experiencing hearing difficulties for some time, often requiring repetition of statements for comprehension. She has not undergone any hearing tests.    She occasionally develops a rash on her back, which  "is currently absent.    She reports that her toenails are not growing properly, leading to ingrown toenails. She has attempted self-removal but is apprehensive due to her diabetes.    She has been provided with a blood sugar monitor but is unsure of its operation. She has made some adjustments to the device and is uncertain about their reversal.    She has received vitamin B injections from [place] and is seeking advice on their continued use.    Current medicines (including reconciliation performed today)  Current Medications[1]    Allergies:   Patient has no known allergies.    Social History[2]    ROS:  No chest pain, no shortness of breath, no abdominal pain  Positive ROS as per HPI.  All other systems reviewed and are negative.    Objective:     Vitals:    06/25/25 1113   BP: 106/48   BP Location: Right arm   Patient Position: Sitting   BP Cuff Size: Adult   Pulse: (!) 43   Temp: 36.6 °C (97.8 °F)   TempSrc: Temporal   SpO2: 94%   Height: 1.6 m (5' 3\")     Body mass index is 23.59 kg/m².    Physical Exam:  Constitutional: Alert, no distress.  Skin: Warm, dry, good turgor, no rashes in visible areas.  Eye: Equal, round and reactive, conjunctiva clear, lids normal.  ENMT: Lips without lesions, good dentition, external auditory canals patent, no cerumen  Respiratory: Unlabored respiratory effort, lungs clear to auscultation, no wheezes, no ronchi.  Cardiovascular: Normal S1, S2, no murmur, no edema.  Psych: Alert and oriented x3, normal affect and mood.    Results  Labs   - Creatinine: 06/12/2025, Increased to 2.08 but returned to baseline   - Electrolytes: 06/12/2025, Mild hyperkalemia treated with IV insulin and calcium gluconate   - Vitamin B levels: High    Assessment and Plan:     Assessment & Plan  1. Post-hospitalization follow-up.  - Symptoms of weakness may be attributed to an imbalance in electrolyte levels.  - A non-fasting blood test will be ordered today to assess kidney function and electrolyte " levels.  - Results will be communicated upon receipt.  - Follow-up appointment scheduled for 4-6 weeks    2. Hearing impairment.  - Reports difficulty hearing and understanding conversations.  - No ear wax or obstruction noted upon examination.  - Referral for a hearing test will be made to evaluate auditory function.    3. Rash.  - Occasionally develops a rash on her back, which is currently absent.  - No active treatment required at this time.    4. Ingrown toenails.  - Presents with ingrown toenails accompanied by a fungal infection.  - Referral to a podiatrist will be made for further evaluation and treatment of toenail condition.    5. Diabetes mellitus.  - Freestyle Pearl 3 applied to patient today, showed her and son how to properly apply and use with reader  - Instructions given on how to use the blood sugar monitor, including ensuring the skin is dry before applying the device and how to replace it every 15 days.  - Assistance provided in setting up the monitor during the visit.    6. Elevated vitamin B levels.  Patient got B complex injection prescription from friend from [place], wants to know if she can use them.   - Vitamin B levels have been consistently high, negating the need for additional supplementation at this time.  - Blood test will be ordered to monitor vitamin B levels.    1. Type 2 diabetes mellitus with stage 4 chronic kidney disease, with long-term current use of insulin (HCC)  - CBC WITH DIFFERENTIAL; Future  - Comp Metabolic Panel; Future  - TSH; Future  - FREE THYROXINE; Future  - Referral to Podiatry    2. Stage 4 chronic kidney disease (HCC)  - CBC WITH DIFFERENTIAL; Future  - Comp Metabolic Panel; Future  - TSH; Future  - FREE THYROXINE; Future  - VITAMIN B12; Future  - VITAMIN B6; Future  - VITAMIN B1; Future    3. Weakness  - CBC WITH DIFFERENTIAL; Future  - Comp Metabolic Panel; Future  - TSH; Future  - FREE THYROXINE; Future    4. Ingrown toenail of both feet  - Referral to  Podiatry    5. Onychomycosis of multiple toenails with type 2 diabetes mellitus (HCC)  - Referral to Podiatry    6. Anemia due to stage 4 chronic kidney disease (HCC)  - VITAMIN B12; Future  - VITAMIN B6; Future  - VITAMIN B1; Future    7. Bilateral hearing loss, unspecified hearing loss type  - Referral to Audiology      - Chart and discharge summary were reviewed.   - Hospitalization and results reviewed with patient.   - Medications reviewed including instructions regarding high risk medications, dosing and side effects.  - Recommended Services: No services needed at this time  - Advance directive/POLST on file?  No     Follow-up:Return in about 4 weeks (around 7/23/2025).    Face-to-face transitional care management services with HIGH (today's visit is within days post discharge & LACE+ score 59+) medical decision complexity were provided.     The MA is performing the above orders under the direction of Dr. Mesa or Dr. Levy    Please note that this dictation was created using voice recognition software. I have made every reasonable attempt to correct obvious errors, but I expect that there are errors of grammar and possibly content that I did not discover before finalizing the note.      Attestation      Verbal consent was acquired by the patient to use "[employer]" ambient listening note generation during this visit Yes      LACE+ Historical Score Over Time (0-28: Low, 29-58: Medium, 59+: High): 76                   [1]   Current Outpatient Medications   Medication Sig Dispense Refill    insulin glargine (LANTUS SOLOSTAR) 100 UNIT/ML Solution Pen-injector injection Inject 20-30 Units under the skin 2 times a day. Pt takes 30 units AM and 20 units in the evening 18 mL 0    amiodarone (PACERONE) 400 MG tablet Take 1 Tablet by mouth 2 times a day. 30 Tablet 1    Insulin Pen Needle 32 G x 4 mm Use one pen needle in pen device to inject insulin three times daily. 100 Each 0    metoprolol SR (TOPROL XL) 25 MG TABLET SR  24 HR TOME 1 TABLETA POR VIA ORAL TODOS LOS MCCORMICK 90 Tablet 3    FARXIGA 5 MG Tab TOME 1 TABLETA POR VIA ORAL TODOS LOS MCCORMICK FOR DIABETES 90 Tablet 3    insulin aspart (NOVOLOG) 100 UNIT/ML Solution Inject 20 Units under the skin 3 times a day before meals. Per [facility] pt is to take 20 units TID with meals   No sliding scale or carb counting      mycophenolate (CELLCEPT) 250 MG Cap Take 250 mg by mouth 2 times a day.      predniSONE (DELTASONE) 5 MG Tab Take 5 mg by mouth every day.      tacrolimus (PROGRAF) 1 MG Cap Take 1 mg by mouth 2 times a day.      amLODIPine (NORVASC) 10 MG Tab Take 10 mg by mouth every day. [facility], there is a prescription there wait to be picked up (6/11/2025)      vitamin D2, Ergocalciferol, (DRISDOL) 1.25 MG (55727 UT) Cap capsule Take 1 Capsule by mouth every 7 days. 12 Capsule 0    Continuous Glucose Sensor (FREESTYLE BALDOMERO 3 PLUS SENSOR) Misc Apply every 15 days 6 Each 4    Continuous Glucose  (FREESTYLE BALDOMERO 3 READER) Device Use 1 each continuously. 1 Each 0    apixaban (ELIQUIS) 2.5mg Tab Take 1 Tablet by mouth 2 times a day. TOME 1 TABLETA POR VIA ORAL DOS VECES AL DESMOND 60 Tablet 5    atorvastatin (LIPITOR) 20 MG Tab TOME 1 TABLETA POR VIA ORAL TODOS LOS MCCORMICK EN LA NOCHE 100 Tablet 3    furosemide (LASIX) 80 MG Tab Take 1 Tablet by mouth every day. (Patient not taking: Reported on 6/16/2025) 100 Tablet 3    BD PEN NEEDLE PETE 2ND GEN USE AS DIRECTED WITH INSULIN PENS SIX TIMES DAILY 200 Each 3    Continuous Glucose Sensor (FREESTYLE BALDOMERO 3 SENSOR) Misc 1 Each every 14 days. 6 Each 3    levothyroxine (SYNTHROID) 88 MCG Tab Take 1 Tablet by mouth every morning on an empty stomach. 90 Tablet 3    glucose blood (ACCU-CHEK GUIDE) strip USE ONE COVERED STRIP TO TEST BLOOD SUGAR THREE TIMES DAILY. 100 Strip 3    Lancets Use one covered lancet to test blood sugar three times daily. 100 Each 3     No current facility-administered medications for this visit.   [2]    Social History  Tobacco Use    Smoking status: Never    Smokeless tobacco: Never   Vaping Use    Vaping status: Never Used   Substance Use Topics    Alcohol use: No     Alcohol/week: 0.0 oz    Drug use: No

## 2025-06-26 DIAGNOSIS — E03.9 HYPOTHYROIDISM, ACQUIRED: ICD-10-CM

## 2025-06-26 RX ORDER — LEVOTHYROXINE SODIUM 88 UG/1
TABLET ORAL
Qty: 90 TABLET | Refills: 3 | Status: SHIPPED | OUTPATIENT
Start: 2025-06-26

## 2025-07-07 ENCOUNTER — APPOINTMENT (OUTPATIENT)
Dept: CARDIOLOGY | Facility: MEDICAL CENTER | Age: 75
End: 2025-07-07
Attending: NURSE PRACTITIONER
Payer: MEDICARE

## 2025-07-16 ENCOUNTER — OFFICE VISIT (OUTPATIENT)
Dept: ENDOCRINOLOGY | Facility: MEDICAL CENTER | Age: 75
End: 2025-07-16
Attending: INTERNAL MEDICINE
Payer: MEDICARE

## 2025-07-16 VITALS — WEIGHT: 118.5 LBS | BODY MASS INDEX: 20.99 KG/M2

## 2025-07-16 DIAGNOSIS — Z79.4 TYPE 2 DIABETES MELLITUS WITH OTHER SPECIFIED COMPLICATION, WITH LONG-TERM CURRENT USE OF INSULIN (HCC): Primary | ICD-10-CM

## 2025-07-16 DIAGNOSIS — E11.69 TYPE 2 DIABETES MELLITUS WITH OTHER SPECIFIED COMPLICATION, WITH LONG-TERM CURRENT USE OF INSULIN (HCC): Primary | ICD-10-CM

## 2025-07-16 DIAGNOSIS — E11.69 TYPE 2 DIABETES MELLITUS WITH OTHER SPECIFIED COMPLICATION, WITH LONG-TERM CURRENT USE OF INSULIN (HCC): ICD-10-CM

## 2025-07-16 DIAGNOSIS — Z79.4 TYPE 2 DIABETES MELLITUS WITH OTHER SPECIFIED COMPLICATION, WITH LONG-TERM CURRENT USE OF INSULIN (HCC): ICD-10-CM

## 2025-07-16 LAB
HBA1C MFR BLD: 12.1 % (ref ?–5.8)
POCT INT CON NEG: NEGATIVE
POCT INT CON POS: POSITIVE

## 2025-07-16 PROCEDURE — 83036 HEMOGLOBIN GLYCOSYLATED A1C: CPT

## 2025-07-16 PROCEDURE — RXMED WILLOW AMBULATORY MEDICATION CHARGE

## 2025-07-16 PROCEDURE — 99212 OFFICE O/P EST SF 10 MIN: CPT

## 2025-07-16 RX ORDER — PEN NEEDLE, DIABETIC 32GX 5/32"
NEEDLE, DISPOSABLE MISCELLANEOUS
Qty: 400 EACH | Refills: 3 | Status: SHIPPED | OUTPATIENT
Start: 2025-07-16

## 2025-07-16 RX ORDER — INSULIN GLARGINE 100 [IU]/ML
INJECTION, SOLUTION SUBCUTANEOUS
Qty: 75 ML | Refills: 1 | Status: SHIPPED | OUTPATIENT
Start: 2025-07-16

## 2025-07-16 RX ORDER — INSULIN ASPART 100 [IU]/ML
INJECTION, SOLUTION INTRAVENOUS; SUBCUTANEOUS
Qty: 60 ML | Refills: 1 | Status: SHIPPED | OUTPATIENT
Start: 2025-07-16

## 2025-07-16 ASSESSMENT — FIBROSIS 4 INDEX: FIB4 SCORE: 2.88

## 2025-07-16 NOTE — PROGRESS NOTES
"Patient Consult Note    Primary care physician: ANGELITA Concepcion    Reason for Consult: Management of Uncontrolled Type 2 Diabetes    Date Referral Placed: 04/16/2025    Utilized translation services for this encounter.  Language Line - [phone]  [facility] ID - [id number]   [place], ID # [id number]    HPI:  Tere Gallegos is a 74 y.o. old patient who comes in today for evaluation of above stated problem.    Allergies  Patient has no known allergies.    Current Diabetes Medication Regimen  SGLT-2 Inhibitor: dapagliflozin (Farxiga) 5 mg daily  Basal Insulin: Lantus 30 units under the skin BID -- knows this as the \"blue pen\"  Prandial Insulin: Novolog 20 units under the skin AC TID -- knows this as the \"orange pen\"   States she injects another dose of 20 units when fingerstick BG is > 400    Previous Diabetes Medications and Reason for Discontinuation  Pioglitazone 30 mg  Glipizide 5 mg  Glimepiride 1 mg  Trulicity 1.5 mg  Insulin Regular 5-10 units daily    Potential Barriers to Care:  Adherence: denies missed doses. Confirmed correct administration technique for insulin therapy. Today's visit was focused on education regarding insulin therapy. She brought in empty pens of Lantus and Novolog which had expiration dates of 12/31/24 and 10/31/24 respectively. She states that she recently picked up new supplies for Lantus but it appears she has been using an old supply of Novolog.   Side effects: none  Affordability: no issues    SMBG      Hyperglycemia/Hypoglycemia  Hyperglycemia: polyuria, polydipsia  Hypoglycemia awareness: No   Nocturnal hypoglycemia: None  Hypoglycemia:  None  Pt's treatment of Hypoglycemia  Discussed 15:15 Rule    Lifestyle -- not discussed d/t time constraints (copied from previous visit)  Current Exercise - none currently, goes on walks      Dietary - 2-3 depending on hunger - small meals  Breakfast - eggs, oatmeal   Lunch - chicken, meat  Dinner - homemade bread "   Snacks - denies snacking  Drinks - a little coffee (without caffeine), water (lots of water)    Labs  Lab Results   Component Value Date/Time    HBA1C 12.1 (A) 2025 02:16 PM    HBA1C 11.4 (A) 2025 11:19 AM    HBA1C 8.3 (H) 2025 11:36 AM    HBA1C 8.3 (A) 2025 11:36 AM      Lab Results   Component Value Date/Time    SODIUM 132 (L) 06/15/2025 01:37 AM    POTASSIUM 5.1 06/15/2025 01:37 AM    CHLORIDE 104 06/15/2025 01:37 AM    CO2 15 (L) 06/15/2025 01:37 AM    GLUCOSE 63 (L) 06/15/2025 01:37 AM    BUN 62 (H) 06/15/2025 01:37 AM    CREATININE 1.88 (H) 06/15/2025 01:37 AM    BUNCREATRAT 8 (L) 2021 07:00 AM     Lab Results   Component Value Date/Time    ALKPHOSPHAT 95 2025 08:21 AM    ASTSGOT 18 2025 08:21 AM    ALTSGPT 22 2025 08:21 AM    TBILIRUBIN 0.4 2025 08:21 AM    INR 0.98 2023 11:10 AM    ALBUMIN 3.8 06/15/2025 01:37 AM      Lab Results   Component Value Date/Time    CHOLSTRLTOT 134 2025 08:21 AM    LDL 47 2025 08:21 AM    HDL 35 (A) 2025 08:21 AM    TRIGLYCERIDE 259 (H) 2025 08:21 AM       Lab Results   Component Value Date/Time    MALBCRT see below 2025 08:21 AM    MICROALBUR <1.2 2025 08:21 AM       Physical Examination:  Vital signs: Wt 53.8 kg (118 lb 8 oz)   LMP  (LMP Unknown)   BMI 20.99 kg/m²  Body mass index is 20.99 kg/m².    Assessment and Plan:    1. DM2  Discussed Goals: FBG <150, 2hPP < 200, a1c < 8.0%  Last a1c drawn today was 12.1%, which is not at goal and has worsened since the previous reading  TIR not at goal of > 50% . Hyperglycemia noted 99% of the time.  Patient confirms adherence to insulin therapy and proper administration technique, however it appears that her current supply (particularly bolus insulin)  in 10/2024 which is likely why glucose is elevated throughout the day. Will send new Rx for bolus insulin.  FBG slightly better but still overall elevated. Will increase basal insulin  for additional BG control.  Patient will discard  medications    - Medication changes:  Increase Lantus to 40 units BID    - Continue:  Farxiga 5 mg daily  Novolog 20 units before meals    - Lifestyle changes:  Exercise Goal - not discussed due to time limits will discuss at next visit  Dietary Goal - not discussed due to time limits will discuss at next visit    - Preventative management:  REC DM Score: N/A  Care gaps addressed:   A1c is above 8%: Optimized DM medications/management  Care gaps updated in Health Maintenance    Follow Up:  3 weeks    Марина Palencia, DominiqueD    CC:   ANGELITA Concepcion

## 2025-07-17 ENCOUNTER — NON-PROVIDER VISIT (OUTPATIENT)
Dept: CARDIOLOGY | Facility: MEDICAL CENTER | Age: 75
End: 2025-07-17
Attending: INTERNAL MEDICINE
Payer: MEDICARE

## 2025-07-17 DIAGNOSIS — I48.0 PAF (PAROXYSMAL ATRIAL FIBRILLATION) (HCC): ICD-10-CM

## 2025-07-17 NOTE — PROGRESS NOTES
Home enrollment completed in 14 day Zio Holter Monitor per Damon Crawley M.D.   Monitor will be shipped to patient via [employer]  Pending EOS

## 2025-07-18 ENCOUNTER — PHARMACY VISIT (OUTPATIENT)
Dept: PHARMACY | Facility: MEDICAL CENTER | Age: 75
End: 2025-07-18
Payer: COMMERCIAL

## 2025-07-19 ENCOUNTER — HOSPITAL ENCOUNTER (OUTPATIENT)
Facility: MEDICAL CENTER | Age: 75
End: 2025-07-19
Attending: NURSE PRACTITIONER
Payer: MEDICARE

## 2025-07-19 DIAGNOSIS — N18.4 TYPE 2 DIABETES MELLITUS WITH STAGE 4 CHRONIC KIDNEY DISEASE, WITH LONG-TERM CURRENT USE OF INSULIN (HCC): ICD-10-CM

## 2025-07-19 DIAGNOSIS — Z79.4 TYPE 2 DIABETES MELLITUS WITH STAGE 4 CHRONIC KIDNEY DISEASE, WITH LONG-TERM CURRENT USE OF INSULIN (HCC): ICD-10-CM

## 2025-07-19 DIAGNOSIS — R53.1 WEAKNESS: ICD-10-CM

## 2025-07-19 DIAGNOSIS — D63.1 ANEMIA DUE TO STAGE 4 CHRONIC KIDNEY DISEASE (HCC): ICD-10-CM

## 2025-07-19 DIAGNOSIS — E11.22 TYPE 2 DIABETES MELLITUS WITH STAGE 4 CHRONIC KIDNEY DISEASE, WITH LONG-TERM CURRENT USE OF INSULIN (HCC): ICD-10-CM

## 2025-07-19 DIAGNOSIS — N18.4 ANEMIA DUE TO STAGE 4 CHRONIC KIDNEY DISEASE (HCC): ICD-10-CM

## 2025-07-19 DIAGNOSIS — N18.4 STAGE 4 CHRONIC KIDNEY DISEASE (HCC): ICD-10-CM

## 2025-07-19 LAB
ALBUMIN SERPL BCP-MCNC: 4.3 G/DL (ref 3.2–4.9)
ALBUMIN/GLOB SERPL: 2 G/DL
ALP SERPL-CCNC: 76 U/L (ref 30–99)
ALT SERPL-CCNC: 14 U/L (ref 2–50)
ANION GAP SERPL CALC-SCNC: 15 MMOL/L (ref 7–16)
AST SERPL-CCNC: 16 U/L (ref 12–45)
BASOPHILS # BLD AUTO: 0.2 % (ref 0–1.8)
BASOPHILS # BLD: 0.01 K/UL (ref 0–0.12)
BILIRUB SERPL-MCNC: 0.5 MG/DL (ref 0.1–1.5)
BUN SERPL-MCNC: 41 MG/DL (ref 8–22)
CALCIUM ALBUM COR SERPL-MCNC: 10 MG/DL (ref 8.5–10.5)
CALCIUM SERPL-MCNC: 10.2 MG/DL (ref 8.5–10.5)
CHLORIDE SERPL-SCNC: 103 MMOL/L (ref 96–112)
CO2 SERPL-SCNC: 21 MMOL/L (ref 20–33)
CREAT SERPL-MCNC: 1.89 MG/DL (ref 0.5–1.4)
EOSINOPHIL # BLD AUTO: 0.03 K/UL (ref 0–0.51)
EOSINOPHIL NFR BLD: 0.6 % (ref 0–6.9)
ERYTHROCYTE [DISTWIDTH] IN BLOOD BY AUTOMATED COUNT: 42.1 FL (ref 35.9–50)
GFR SERPLBLD CREATININE-BSD FMLA CKD-EPI: 27 ML/MIN/1.73 M 2
GLOBULIN SER CALC-MCNC: 2.2 G/DL (ref 1.9–3.5)
GLUCOSE SERPL-MCNC: 86 MG/DL (ref 65–99)
HCT VFR BLD AUTO: 45.3 % (ref 37–47)
HGB BLD-MCNC: 14.6 G/DL (ref 12–16)
IMM GRANULOCYTES # BLD AUTO: 0.02 K/UL (ref 0–0.11)
IMM GRANULOCYTES NFR BLD AUTO: 0.4 % (ref 0–0.9)
LYMPHOCYTES # BLD AUTO: 0.55 K/UL (ref 1–4.8)
LYMPHOCYTES NFR BLD: 11.6 % (ref 22–41)
MCH RBC QN AUTO: 27.7 PG (ref 27–33)
MCHC RBC AUTO-ENTMCNC: 32.2 G/DL (ref 32.2–35.5)
MCV RBC AUTO: 85.8 FL (ref 81.4–97.8)
MONOCYTES # BLD AUTO: 0.49 K/UL (ref 0–0.85)
MONOCYTES NFR BLD AUTO: 10.4 % (ref 0–13.4)
NEUTROPHILS # BLD AUTO: 3.63 K/UL (ref 1.82–7.42)
NEUTROPHILS NFR BLD: 76.8 % (ref 44–72)
NRBC # BLD AUTO: 0 K/UL
NRBC BLD-RTO: 0 /100 WBC (ref 0–0.2)
PLATELET # BLD AUTO: 113 K/UL (ref 164–446)
PMV BLD AUTO: 12.4 FL (ref 9–12.9)
POTASSIUM SERPL-SCNC: 4.5 MMOL/L (ref 3.6–5.5)
PROT SERPL-MCNC: 6.5 G/DL (ref 6–8.2)
RBC # BLD AUTO: 5.28 M/UL (ref 4.2–5.4)
SODIUM SERPL-SCNC: 139 MMOL/L (ref 135–145)
T4 FREE SERPL-MCNC: 1.91 NG/DL (ref 0.93–1.7)
TSH SERPL DL<=0.005 MIU/L-ACNC: 3.2 UIU/ML (ref 0.38–5.33)
VIT B12 SERPL-MCNC: 583 PG/ML (ref 211–911)
WBC # BLD AUTO: 4.7 K/UL (ref 4.8–10.8)

## 2025-07-19 PROCEDURE — 84439 ASSAY OF FREE THYROXINE: CPT

## 2025-07-19 PROCEDURE — 82607 VITAMIN B-12: CPT

## 2025-07-19 PROCEDURE — 36415 COLL VENOUS BLD VENIPUNCTURE: CPT

## 2025-07-19 PROCEDURE — 84207 ASSAY OF VITAMIN B-6: CPT

## 2025-07-19 PROCEDURE — 85025 COMPLETE CBC W/AUTO DIFF WBC: CPT

## 2025-07-19 PROCEDURE — 80053 COMPREHEN METABOLIC PANEL: CPT

## 2025-07-19 PROCEDURE — 84443 ASSAY THYROID STIM HORMONE: CPT

## 2025-07-19 PROCEDURE — 84425 ASSAY OF VITAMIN B-1: CPT

## 2025-07-21 ENCOUNTER — RESULTS FOLLOW-UP (OUTPATIENT)
Dept: MEDICAL GROUP | Facility: IMAGING CENTER | Age: 75
End: 2025-07-21
Payer: MEDICARE

## 2025-07-22 LAB — VIT B1 BLD-MCNC: 85 NMOL/L (ref 70–180)

## 2025-07-23 NOTE — PROGRESS NOTES
Angiogram of Left Atrial Appendage  Pt's BP elevated at 178/47.  PRN vasotec 1ml given per MAR.  Will reassess in one hour.

## 2025-07-24 LAB — VIT B6 SERPL-MCNC: 25.4 NMOL/L (ref 20–125)

## 2025-07-28 DIAGNOSIS — E03.9 HYPOTHYROIDISM, ACQUIRED: ICD-10-CM

## 2025-07-29 DIAGNOSIS — E03.9 HYPOTHYROIDISM, ACQUIRED: ICD-10-CM

## 2025-07-29 RX ORDER — LEVOTHYROXINE SODIUM 88 UG/1
TABLET ORAL
Qty: 90 TABLET | Refills: 3 | Status: SHIPPED
Start: 2025-07-29 | End: 2025-07-29 | Stop reason: SDUPTHER

## 2025-07-29 RX ORDER — LEVOTHYROXINE SODIUM 88 UG/1
88 TABLET ORAL
Qty: 90 TABLET | Refills: 3 | Status: SHIPPED | OUTPATIENT
Start: 2025-07-29

## 2025-07-30 ENCOUNTER — APPOINTMENT (OUTPATIENT)
Dept: MEDICAL GROUP | Facility: IMAGING CENTER | Age: 75
End: 2025-07-30
Payer: MEDICARE

## 2025-07-30 VITALS
OXYGEN SATURATION: 95 % | HEIGHT: 60 IN | HEART RATE: 46 BPM | WEIGHT: 122.2 LBS | SYSTOLIC BLOOD PRESSURE: 122 MMHG | TEMPERATURE: 97.5 F | DIASTOLIC BLOOD PRESSURE: 38 MMHG | BODY MASS INDEX: 23.99 KG/M2

## 2025-07-30 DIAGNOSIS — M25.512 CHRONIC LEFT SHOULDER PAIN: Primary | ICD-10-CM

## 2025-07-30 DIAGNOSIS — M79.89 LEG SWELLING: ICD-10-CM

## 2025-07-30 DIAGNOSIS — E11.22 TYPE 2 DIABETES MELLITUS WITH STAGE 4 CHRONIC KIDNEY DISEASE, WITH LONG-TERM CURRENT USE OF INSULIN (HCC): ICD-10-CM

## 2025-07-30 DIAGNOSIS — G89.29 CHRONIC LEFT SHOULDER PAIN: Primary | ICD-10-CM

## 2025-07-30 DIAGNOSIS — N18.4 TYPE 2 DIABETES MELLITUS WITH STAGE 4 CHRONIC KIDNEY DISEASE, WITH LONG-TERM CURRENT USE OF INSULIN (HCC): ICD-10-CM

## 2025-07-30 DIAGNOSIS — Z79.4 TYPE 2 DIABETES MELLITUS WITH STAGE 4 CHRONIC KIDNEY DISEASE, WITH LONG-TERM CURRENT USE OF INSULIN (HCC): ICD-10-CM

## 2025-07-30 ASSESSMENT — FIBROSIS 4 INDEX: FIB4 SCORE: 2.84

## 2025-07-31 NOTE — PROGRESS NOTES
Subjective:     History of Present Illness  The patient presents for evaluation of diabetes, leg swelling, back pain, and left shoulder pain. She is accompanied by her son. The appointment is being translated by a medical assistant in the office.    She has been managing her diabetes with Lantus insulin, administered at 40 mg in the morning and at night, and Farxiga once daily. An appointment with a diabetes specialist is scheduled for 08/06/2025 at 10:30 AM. She has been using Dexcom and requests refills for her Freestyle Pearl.    She has been experiencing leg swelling for the past 2 weeks, which is not associated with pain. She does not use compression socks and reports no shortness of breath. She continues to take Lasix twice daily.    She experiences back pain, particularly when bending over, which has been a long-standing issue. She feels fine at home but struggles with balance when outside. She also reports numbness in her legs, especially at night, and coldness in her lower knees and feet. A consultation with a spine specialist in 01/2025 recommended an injection for her back pain. However, this was not administered due to her elevated glucose levels.    She also reports fatigue and pain in her left arm, extending from her shoulder down to her left lobe. She has difficulty raising her arm and cannot lie on it. She has not experienced any falls. She has tried Tylenol for her back and arm pain, but it was ineffective.    She has not yet seen the podiatrist or audiologist.      Current medicines (including changes today)  Current Medications[1]  She  has a past medical history of Acquired hypothyroidism (05/04/2020), CAD (coronary artery disease), Chronic diastolic heart failure (HCC) (05/04/2020), Coronary artery disease due to lipid rich plaque, Dental disorder, Diabetes ([facility]), ESRD (end stage renal disease) on dialysis ([facility]) (05/04/2020), Hemodialysis patient ([facility]), Hyperlipidemia, Hypertension, Kidney  transplant candidate, Kidney transplant recipient (10/31/2022), Presence of drug-eluting stent in right coronary artery, QT prolongation (01/22/2020), RLS (restless legs syndrome) (08/05/2016), and Transaminitis (12/22/2018).    ROS   No chest pain, no shortness of breath, no abdominal pain  Positive ROS as per HPI.  All other systems reviewed and are negative.     Objective:     BP (!) 122/38 (BP Location: Left arm, Patient Position: Sitting, BP Cuff Size: Adult)   Pulse (!) 46   Temp 36.4 °C (97.5 °F) (Temporal)   Ht 1.524 m (5')   Wt 55.4 kg (122 lb 3.2 oz)   SpO2 95%  Body mass index is 23.87 kg/m².   Physical Exam  Extremities: Bilateral leg swelling  Musculoskeletal: Left shoulder pain with abduction and adduction  Constitutional: Alert, no distress.  Skin: Warm, dry, good turgor, no rashes in visible areas.  Eye: Equal, round and reactive, conjunctiva clear, lids normal.  ENMT: Lips without lesions, good dentition  Respiratory: Unlabored respiratory effort  Psych: Alert and oriented x3, normal affect and mood.      Results          Assessment and Plan:   The following treatment plan was discussed    Assessment & Plan  1. Diabetes mellitus.  - Currently taking Lantus insulin 40 mg in the morning and at night, Novolog with meals, and Farxiga once a day.  - Will follow up with the diabetes specialist on 08/06/2025 at 10:30 AM.  - Refill for Freestyle Pearl has been provided.  Applied in office today and synced with reader    2. Bilateral leg swelling, mild  - Currently on Lasix 80 mg daily, may need additional dose, she will see her cardiologist in 2 weeks.   - Use of compression socks was suggested to prevent fluid accumulation in the legs.  - A BNP will be conducted to check for water retention.  - Communication with her cardiologist regarding the possibility of an additional dose of Lasix will be initiated.    3. Back pain.  - Previously referred to a spine specialist who recommended an injection for her  back pain, but it was not administered due to elevated glucose levels.  - Follow-up with Dr. Matute will be scheduled to discuss potential injections for her back pain.    4. Left shoulder pain.  - An x-ray of the left shoulder will be ordered to investigate the cause of the pain.  - Will be informed of the results once they are available.    Follow-up: The patient will follow up in 3 months.      ORDERS:  1. Chronic left shoulder pain (Primary)  - DX-SHOULDER 2+ LEFT; Future    2. Leg swelling  - proBrain Natriuretic Peptide, NT; Future    3. Type 2 diabetes mellitus with stage 4 chronic kidney disease, with long-term current use of insulin (HCC)          The MA is performing the above orders under the direction of Dr. Mesa or Dr. Levy    Please note that this dictation was created using voice recognition software. I have made every reasonable attempt to correct obvious errors, but I expect that there are errors of grammar and possibly content that I did not discover before finalizing the note.      Attestation      Verbal consent was acquired by the patient to use [employer] ambient listening note generation during this visit Yes                  [1]   Current Outpatient Medications   Medication Sig Dispense Refill    levothyroxine (SYNTHROID) 88 MCG Tab Take 1 Tablet by mouth every morning on an empty stomach. 90 Tablet 3    insulin aspart (NOVOLOG FLEXPEN) 100 UNIT/ML injection PEN Inject 20 units subq 3 times daily before meals or as directed 60 mL 1    insulin glargine (LANTUS SOLOSTAR) 100 unit/mL Solution Pen-injector injection PEN Inject 40 units subq twice daily or as directed 75 mL 1    Insulin Pen Needle 32 G x 4 mm (BD PEN NEEDLE PETE 2ND GEN) USE ONE PEN NEEDLE IN PEN DEVICE TO INJECT INSULIN 4 TIMES DAILY. 400 Each 3    amiodarone (PACERONE) 400 MG tablet Take 1 Tablet by mouth 2 times a day. 30 Tablet 1    metoprolol SR (TOPROL XL) 25 MG TABLET SR 24 HR TOME 1 TABLETA POR VIA ORAL TODOS LOS MCCORMICK 90  Tablet 3    FARXIGA 5 MG Tab TOME 1 TABLETA POR VIA ORAL TODOS LOS MCCORMICK FOR DIABETES 90 Tablet 3    mycophenolate (CELLCEPT) 250 MG Cap Take 250 mg by mouth 2 times a day.      predniSONE (DELTASONE) 5 MG Tab Take 5 mg by mouth every day.      tacrolimus (PROGRAF) 1 MG Cap Take 1 mg by mouth 2 times a day.      amLODIPine (NORVASC) 10 MG Tab Take 10 mg by mouth every day. [facility], there is a prescription there wait to be picked up (6/11/2025)      vitamin D2, Ergocalciferol, (DRISDOL) 1.25 MG (82267 UT) Cap capsule Take 1 Capsule by mouth every 7 days. 12 Capsule 0    Continuous Glucose Sensor (FREESTYLE BALDOMERO 3 PLUS SENSOR) Misc Apply every 15 days 6 Each 4    Continuous Glucose  (FREESTYLE BALDOMERO 3 READER) Device Use 1 each continuously. 1 Each 0    apixaban (ELIQUIS) 2.5mg Tab Take 1 Tablet by mouth 2 times a day. TOME 1 TABLETA POR VIA ORAL DOS VECES AL DESMOND 60 Tablet 5    atorvastatin (LIPITOR) 20 MG Tab TOME 1 TABLETA POR VIA ORAL TODOS LOS MCCORMICK EN LA NOCHE 100 Tablet 3    furosemide (LASIX) 80 MG Tab Take 1 Tablet by mouth every day. 100 Tablet 3    glucose blood (ACCU-CHEK GUIDE) strip USE ONE COVERED STRIP TO TEST BLOOD SUGAR THREE TIMES DAILY. 100 Strip 3    Lancets Use one covered lancet to test blood sugar three times daily. 100 Each 3     No current facility-administered medications for this visit.

## 2025-08-06 ENCOUNTER — OFFICE VISIT (OUTPATIENT)
Dept: ENDOCRINOLOGY | Facility: MEDICAL CENTER | Age: 75
End: 2025-08-06
Payer: MEDICARE

## 2025-08-06 DIAGNOSIS — Z79.4 TYPE 2 DIABETES MELLITUS WITH OTHER SPECIFIED COMPLICATION, WITH LONG-TERM CURRENT USE OF INSULIN (HCC): Primary | ICD-10-CM

## 2025-08-06 DIAGNOSIS — E03.9 HYPOTHYROIDISM, ACQUIRED: ICD-10-CM

## 2025-08-06 DIAGNOSIS — E55.9 VITAMIN D DEFICIENCY: ICD-10-CM

## 2025-08-06 DIAGNOSIS — E78.5 DYSLIPIDEMIA: ICD-10-CM

## 2025-08-06 DIAGNOSIS — E11.69 TYPE 2 DIABETES MELLITUS WITH OTHER SPECIFIED COMPLICATION, WITH LONG-TERM CURRENT USE OF INSULIN (HCC): Primary | ICD-10-CM

## 2025-08-06 PROCEDURE — 99214 OFFICE O/P EST MOD 30 MIN: CPT

## 2025-08-15 ENCOUNTER — OFFICE VISIT (OUTPATIENT)
Dept: CARDIOLOGY | Facility: MEDICAL CENTER | Age: 75
DRG: 303 | End: 2025-08-15
Payer: MEDICARE

## 2025-08-15 VITALS
BODY MASS INDEX: 23.75 KG/M2 | SYSTOLIC BLOOD PRESSURE: 100 MMHG | HEIGHT: 60 IN | RESPIRATION RATE: 16 BRPM | DIASTOLIC BLOOD PRESSURE: 42 MMHG | HEART RATE: 50 BPM | OXYGEN SATURATION: 96 % | WEIGHT: 121 LBS

## 2025-08-15 DIAGNOSIS — E78.2 MIXED HYPERLIPIDEMIA: ICD-10-CM

## 2025-08-15 DIAGNOSIS — I48.91 ATRIAL FIBRILLATION WITH RAPID VENTRICULAR RESPONSE (HCC): Primary | ICD-10-CM

## 2025-08-15 DIAGNOSIS — I10 PRIMARY HYPERTENSION: Chronic | ICD-10-CM

## 2025-08-15 DIAGNOSIS — I50.32 CHRONIC HEART FAILURE WITH PRESERVED EJECTION FRACTION (HCC): ICD-10-CM

## 2025-08-15 DIAGNOSIS — I25.111 CORONARY ARTERY DISEASE INVOLVING NATIVE CORONARY ARTERY OF NATIVE HEART WITH ANGINA PECTORIS WITH DOCUMENTED SPASM (HCC): ICD-10-CM

## 2025-08-15 DIAGNOSIS — I48.0 PAROXYSMAL ATRIAL FIBRILLATION (HCC): ICD-10-CM

## 2025-08-15 DIAGNOSIS — M79.89 LEG SWELLING: ICD-10-CM

## 2025-08-15 DIAGNOSIS — I10 ESSENTIAL HYPERTENSION: ICD-10-CM

## 2025-08-15 DIAGNOSIS — D68.69 SECONDARY HYPERCOAGULABLE STATE (HCC): ICD-10-CM

## 2025-08-15 PROBLEM — R79.89 ELEVATED TROPONIN: Status: RESOLVED | Noted: 2018-12-22 | Resolved: 2025-08-15

## 2025-08-15 PROBLEM — R73.9 HYPERGLYCEMIA: Status: RESOLVED | Noted: 2024-10-15 | Resolved: 2025-08-15

## 2025-08-15 PROBLEM — R00.1 BRADYCARDIA: Status: RESOLVED | Noted: 2024-10-15 | Resolved: 2025-08-15

## 2025-08-15 PROBLEM — N18.31 STAGE 3A CHRONIC KIDNEY DISEASE: Status: RESOLVED | Noted: 2025-05-14 | Resolved: 2025-08-15

## 2025-08-15 PROBLEM — E87.20 METABOLIC ACIDOSIS: Status: RESOLVED | Noted: 2023-09-23 | Resolved: 2025-08-15

## 2025-08-15 PROBLEM — E87.5 HYPERKALEMIA: Status: RESOLVED | Noted: 2024-09-23 | Resolved: 2025-08-15

## 2025-08-15 LAB — EKG IMPRESSION: NORMAL

## 2025-08-15 PROCEDURE — 3074F SYST BP LT 130 MM HG: CPT

## 2025-08-15 PROCEDURE — 93010 ELECTROCARDIOGRAM REPORT: CPT | Performed by: INTERNAL MEDICINE

## 2025-08-15 PROCEDURE — 99214 OFFICE O/P EST MOD 30 MIN: CPT

## 2025-08-15 PROCEDURE — 3078F DIAST BP <80 MM HG: CPT

## 2025-08-15 PROCEDURE — 99212 OFFICE O/P EST SF 10 MIN: CPT

## 2025-08-15 PROCEDURE — 93005 ELECTROCARDIOGRAM TRACING: CPT | Mod: TC

## 2025-08-15 RX ORDER — FUROSEMIDE 80 MG/1
80 TABLET ORAL 2 TIMES DAILY
Qty: 200 TABLET | Refills: 3 | Status: SHIPPED | OUTPATIENT
Start: 2025-08-15

## 2025-08-15 ASSESSMENT — ENCOUNTER SYMPTOMS
PND: 0
NERVOUS/ANXIOUS: 0
GASTROINTESTINAL NEGATIVE: 1
EYES NEGATIVE: 1
PALPITATIONS: 0
DEPRESSION: 0
MUSCULOSKELETAL NEGATIVE: 1
ORTHOPNEA: 0
SHORTNESS OF BREATH: 0
CONSTITUTIONAL NEGATIVE: 1
NEUROLOGICAL NEGATIVE: 1

## 2025-08-15 ASSESSMENT — FIBROSIS 4 INDEX: FIB4 SCORE: 2.84

## 2025-08-16 ENCOUNTER — HOSPITAL ENCOUNTER (OUTPATIENT)
Facility: MEDICAL CENTER | Age: 75
End: 2025-08-16
Attending: INTERNAL MEDICINE
Payer: MEDICARE

## 2025-08-16 DIAGNOSIS — N25.81 SECONDARY HYPERPARATHYROIDISM (HCC): ICD-10-CM

## 2025-08-16 DIAGNOSIS — Z79.4 TYPE 2 DIABETES MELLITUS WITH STAGE 4 CHRONIC KIDNEY DISEASE, WITH LONG-TERM CURRENT USE OF INSULIN (HCC): ICD-10-CM

## 2025-08-16 DIAGNOSIS — B34.8 BK VIREMIA: ICD-10-CM

## 2025-08-16 DIAGNOSIS — N18.4 TYPE 2 DIABETES MELLITUS WITH STAGE 4 CHRONIC KIDNEY DISEASE, WITH LONG-TERM CURRENT USE OF INSULIN (HCC): ICD-10-CM

## 2025-08-16 DIAGNOSIS — Z94.0 DECEASED-DONOR KIDNEY TRANSPLANT RECIPIENT: Chronic | ICD-10-CM

## 2025-08-16 DIAGNOSIS — E11.22 TYPE 2 DIABETES MELLITUS WITH STAGE 4 CHRONIC KIDNEY DISEASE, WITH LONG-TERM CURRENT USE OF INSULIN (HCC): ICD-10-CM

## 2025-08-16 DIAGNOSIS — R30.0 DYSURIA: ICD-10-CM

## 2025-08-16 DIAGNOSIS — N18.4 CKD (CHRONIC KIDNEY DISEASE) STAGE 4, GFR 15-29 ML/MIN (HCC): ICD-10-CM

## 2025-08-16 DIAGNOSIS — E55.9 VITAMIN D DEFICIENCY: ICD-10-CM

## 2025-08-16 LAB
25(OH)D3 SERPL-MCNC: 16 NG/ML (ref 30–100)
ALBUMIN SERPL BCP-MCNC: 4.2 G/DL (ref 3.2–4.9)
ANION GAP SERPL CALC-SCNC: 15 MMOL/L (ref 7–16)
APPEARANCE UR: CLEAR
BACTERIA #/AREA URNS HPF: ABNORMAL /HPF
BILIRUB UR QL STRIP.AUTO: NEGATIVE
BUN SERPL-MCNC: 44 MG/DL (ref 8–22)
CALCIUM SERPL-MCNC: 10 MG/DL (ref 8.5–10.5)
CASTS URNS QL MICRO: ABNORMAL /LPF (ref 0–2)
CHLORIDE SERPL-SCNC: 104 MMOL/L (ref 96–112)
CO2 SERPL-SCNC: 19 MMOL/L (ref 20–33)
COLOR UR: YELLOW
CREAT SERPL-MCNC: 1.91 MG/DL (ref 0.5–1.4)
CREAT UR-MCNC: 64.4 MG/DL
CREAT UR-MCNC: 65.9 MG/DL
EPITHELIAL CELLS 1715: ABNORMAL /HPF (ref 0–5)
ERYTHROCYTE [DISTWIDTH] IN BLOOD BY AUTOMATED COUNT: 41.7 FL (ref 35.9–50)
GFR SERPLBLD CREATININE-BSD FMLA CKD-EPI: 27 ML/MIN/1.73 M 2
GLUCOSE SERPL-MCNC: 174 MG/DL (ref 65–99)
GLUCOSE UR STRIP.AUTO-MCNC: >=1000 MG/DL
HCT VFR BLD AUTO: 43.1 % (ref 37–47)
HGB BLD-MCNC: 13.7 G/DL (ref 12–16)
KETONES UR STRIP.AUTO-MCNC: NEGATIVE MG/DL
LEUKOCYTE ESTERASE UR QL STRIP.AUTO: ABNORMAL
MCH RBC QN AUTO: 27 PG (ref 27–33)
MCHC RBC AUTO-ENTMCNC: 31.8 G/DL (ref 32.2–35.5)
MCV RBC AUTO: 84.8 FL (ref 81.4–97.8)
MICRO URNS: ABNORMAL
MICROALBUMIN UR-MCNC: <1.2 MG/DL
MICROALBUMIN/CREAT UR: NORMAL MG/G (ref 0–30)
NITRITE UR QL STRIP.AUTO: NEGATIVE
PH UR STRIP.AUTO: 5.5 [PH] (ref 5–8)
PHOSPHATE SERPL-MCNC: 2.8 MG/DL (ref 2.5–4.5)
PLATELET # BLD AUTO: 102 K/UL (ref 164–446)
PMV BLD AUTO: 12.1 FL (ref 9–12.9)
POTASSIUM SERPL-SCNC: 4.3 MMOL/L (ref 3.6–5.5)
PROT UR QL STRIP: NEGATIVE MG/DL
PROT UR-MCNC: 6.9 MG/DL (ref 0–15)
PROT/CREAT UR: 105 MG/G (ref 10–107)
PTH-INTACT SERPL-MCNC: 157 PG/ML (ref 14–72)
RBC # BLD AUTO: 5.08 M/UL (ref 4.2–5.4)
RBC # URNS HPF: ABNORMAL /HPF
RBC UR QL AUTO: NEGATIVE
SODIUM SERPL-SCNC: 138 MMOL/L (ref 135–145)
SP GR UR STRIP.AUTO: 1.01
TACROLIMUS BLD-MCNC: 8.5 NG/ML (ref 5–20)
UROBILINOGEN UR STRIP.AUTO-MCNC: 1 EU/DL
WBC # BLD AUTO: 4.2 K/UL (ref 4.8–10.8)
WBC #/AREA URNS HPF: ABNORMAL /HPF

## 2025-08-16 PROCEDURE — 82040 ASSAY OF SERUM ALBUMIN: CPT

## 2025-08-16 PROCEDURE — 82306 VITAMIN D 25 HYDROXY: CPT

## 2025-08-16 PROCEDURE — 84156 ASSAY OF PROTEIN URINE: CPT

## 2025-08-16 PROCEDURE — 83970 ASSAY OF PARATHORMONE: CPT

## 2025-08-16 PROCEDURE — 87799 DETECT AGENT NOS DNA QUANT: CPT

## 2025-08-16 PROCEDURE — 80197 ASSAY OF TACROLIMUS: CPT

## 2025-08-16 PROCEDURE — 82043 UR ALBUMIN QUANTITATIVE: CPT

## 2025-08-16 PROCEDURE — 85027 COMPLETE CBC AUTOMATED: CPT

## 2025-08-16 PROCEDURE — 36415 COLL VENOUS BLD VENIPUNCTURE: CPT

## 2025-08-16 PROCEDURE — 82570 ASSAY OF URINE CREATININE: CPT | Mod: 91

## 2025-08-16 PROCEDURE — 84100 ASSAY OF PHOSPHORUS: CPT

## 2025-08-16 PROCEDURE — 81001 URINALYSIS AUTO W/SCOPE: CPT

## 2025-08-16 PROCEDURE — 80048 BASIC METABOLIC PNL TOTAL CA: CPT

## 2025-08-18 ENCOUNTER — APPOINTMENT (OUTPATIENT)
Dept: RADIOLOGY | Facility: MEDICAL CENTER | Age: 75
DRG: 303 | End: 2025-08-18
Attending: EMERGENCY MEDICINE
Payer: MEDICARE

## 2025-08-18 ENCOUNTER — HOSPITAL ENCOUNTER (INPATIENT)
Facility: MEDICAL CENTER | Age: 75
LOS: 1 days | DRG: 303 | End: 2025-08-19
Attending: EMERGENCY MEDICINE
Payer: MEDICARE

## 2025-08-18 ENCOUNTER — RESULTS FOLLOW-UP (OUTPATIENT)
Dept: CARDIOLOGY | Facility: MEDICAL CENTER | Age: 75
End: 2025-08-18

## 2025-08-18 ENCOUNTER — TELEPHONE (OUTPATIENT)
Dept: CARDIOLOGY | Facility: MEDICAL CENTER | Age: 75
End: 2025-08-18
Payer: MEDICARE

## 2025-08-18 ENCOUNTER — APPOINTMENT (OUTPATIENT)
Dept: RADIOLOGY | Facility: MEDICAL CENTER | Age: 75
DRG: 303 | End: 2025-08-18
Payer: MEDICARE

## 2025-08-18 DIAGNOSIS — N17.9 ACUTE RENAL FAILURE SUPERIMPOSED ON CHRONIC KIDNEY DISEASE, UNSPECIFIED ACUTE RENAL FAILURE TYPE, UNSPECIFIED CKD STAGE (HCC): ICD-10-CM

## 2025-08-18 DIAGNOSIS — R73.9 HYPERGLYCEMIA: ICD-10-CM

## 2025-08-18 DIAGNOSIS — R79.89 ELEVATED BRAIN NATRIURETIC PEPTIDE (BNP) LEVEL: ICD-10-CM

## 2025-08-18 DIAGNOSIS — R94.31 ABNORMAL EKG: ICD-10-CM

## 2025-08-18 DIAGNOSIS — N18.9 ACUTE RENAL FAILURE SUPERIMPOSED ON CHRONIC KIDNEY DISEASE, UNSPECIFIED ACUTE RENAL FAILURE TYPE, UNSPECIFIED CKD STAGE (HCC): ICD-10-CM

## 2025-08-18 DIAGNOSIS — R79.89 ELEVATED TROPONIN: ICD-10-CM

## 2025-08-18 DIAGNOSIS — R07.9 ACUTE CHEST PAIN: Primary | ICD-10-CM

## 2025-08-18 LAB
ALBUMIN SERPL BCP-MCNC: 4.2 G/DL (ref 3.2–4.9)
ALBUMIN/GLOB SERPL: 1.6 G/DL
ALP SERPL-CCNC: 79 U/L (ref 30–99)
ALT SERPL-CCNC: 12 U/L (ref 2–50)
ANION GAP SERPL CALC-SCNC: 16 MMOL/L (ref 7–16)
AST SERPL-CCNC: 20 U/L (ref 12–45)
B-OH-BUTYR SERPL-MCNC: 0.11 MMOL/L (ref 0.02–0.27)
BASE EXCESS BLDV CALC-SCNC: -1 MMOL/L (ref -2–3)
BASOPHILS # BLD AUTO: 0.2 % (ref 0–1.8)
BASOPHILS # BLD: 0.01 K/UL (ref 0–0.12)
BILIRUB SERPL-MCNC: 0.6 MG/DL (ref 0.1–1.5)
BODY TEMPERATURE: 36.1 CENTIGRADE
BUN SERPL-MCNC: 43 MG/DL (ref 8–22)
CALCIUM ALBUM COR SERPL-MCNC: 9.6 MG/DL (ref 8.5–10.5)
CALCIUM SERPL-MCNC: 9.8 MG/DL (ref 8.5–10.5)
CHLORIDE SERPL-SCNC: 95 MMOL/L (ref 96–112)
CO2 SERPL-SCNC: 17 MMOL/L (ref 20–33)
CREAT SERPL-MCNC: 2.16 MG/DL (ref 0.5–1.4)
EKG IMPRESSION: NORMAL
EKG IMPRESSION: NORMAL
EOSINOPHIL # BLD AUTO: 0.01 K/UL (ref 0–0.51)
EOSINOPHIL NFR BLD: 0.2 % (ref 0–6.9)
ERYTHROCYTE [DISTWIDTH] IN BLOOD BY AUTOMATED COUNT: 41.4 FL (ref 35.9–50)
GFR SERPLBLD CREATININE-BSD FMLA CKD-EPI: 23 ML/MIN/1.73 M 2
GLOBULIN SER CALC-MCNC: 2.6 G/DL (ref 1.9–3.5)
GLUCOSE BLD STRIP.AUTO-MCNC: 261 MG/DL (ref 65–99)
GLUCOSE BLD STRIP.AUTO-MCNC: 278 MG/DL (ref 65–99)
GLUCOSE SERPL-MCNC: 523 MG/DL (ref 65–99)
HCO3 BLDV-SCNC: 23 MMOL/L (ref 22–29)
HCT VFR BLD AUTO: 42.3 % (ref 37–47)
HGB BLD-MCNC: 13.7 G/DL (ref 12–16)
IMM GRANULOCYTES # BLD AUTO: 0.01 K/UL (ref 0–0.11)
IMM GRANULOCYTES NFR BLD AUTO: 0.2 % (ref 0–0.9)
LYMPHOCYTES # BLD AUTO: 0.31 K/UL (ref 1–4.8)
LYMPHOCYTES NFR BLD: 6.1 % (ref 22–41)
MCH RBC QN AUTO: 27.1 PG (ref 27–33)
MCHC RBC AUTO-ENTMCNC: 32.4 G/DL (ref 32.2–35.5)
MCV RBC AUTO: 83.8 FL (ref 81.4–97.8)
MONOCYTES # BLD AUTO: 0.23 K/UL (ref 0–0.85)
MONOCYTES NFR BLD AUTO: 4.5 % (ref 0–13.4)
NEUTROPHILS # BLD AUTO: 4.49 K/UL (ref 1.82–7.42)
NEUTROPHILS NFR BLD: 88.8 % (ref 44–72)
NRBC # BLD AUTO: 0 K/UL
NRBC BLD-RTO: 0 /100 WBC (ref 0–0.2)
NT-PROBNP SERPL IA-MCNC: 2338 PG/ML (ref 0–125)
PCO2 BLDV: 36 MMHG (ref 38–54)
PCO2 TEMP ADJ BLDV: 34.6 MMHG (ref 38–54)
PH BLDV: 7.41 [PH] (ref 7.31–7.45)
PH TEMP ADJ BLDV: 7.42 [PH] (ref 7.31–7.45)
PLATELET # BLD AUTO: 121 K/UL (ref 164–446)
PMV BLD AUTO: 12.1 FL (ref 9–12.9)
PO2 BLDV: 48.9 MMHG (ref 23–48)
PO2 TEMP ADJ BLDV: 45.9 MMHG (ref 23–48)
POTASSIUM SERPL-SCNC: 4.9 MMOL/L (ref 3.6–5.5)
PROT SERPL-MCNC: 6.8 G/DL (ref 6–8.2)
RBC # BLD AUTO: 5.05 M/UL (ref 4.2–5.4)
SAO2 % BLDV: 78 % (ref 60–85)
SODIUM SERPL-SCNC: 128 MMOL/L (ref 135–145)
TROPONIN T SERPL-MCNC: 41 NG/L (ref 6–19)
TROPONIN T SERPL-MCNC: 47 NG/L (ref 6–19)
WBC # BLD AUTO: 5.1 K/UL (ref 4.8–10.8)

## 2025-08-18 PROCEDURE — 93005 ELECTROCARDIOGRAM TRACING: CPT | Mod: TC

## 2025-08-18 PROCEDURE — 83880 ASSAY OF NATRIURETIC PEPTIDE: CPT

## 2025-08-18 PROCEDURE — 700102 HCHG RX REV CODE 250 W/ 637 OVERRIDE(OP)

## 2025-08-18 PROCEDURE — 770020 HCHG ROOM/CARE - TELE (206)

## 2025-08-18 PROCEDURE — 700105 HCHG RX REV CODE 258

## 2025-08-18 PROCEDURE — 96374 THER/PROPH/DIAG INJ IV PUSH: CPT

## 2025-08-18 PROCEDURE — 82962 GLUCOSE BLOOD TEST: CPT

## 2025-08-18 PROCEDURE — 96375 TX/PRO/DX INJ NEW DRUG ADDON: CPT

## 2025-08-18 PROCEDURE — A9270 NON-COVERED ITEM OR SERVICE: HCPCS

## 2025-08-18 PROCEDURE — 80053 COMPREHEN METABOLIC PANEL: CPT

## 2025-08-18 PROCEDURE — 36415 COLL VENOUS BLD VENIPUNCTURE: CPT

## 2025-08-18 PROCEDURE — 85025 COMPLETE CBC W/AUTO DIFF WBC: CPT

## 2025-08-18 PROCEDURE — 82010 KETONE BODYS QUAN: CPT

## 2025-08-18 PROCEDURE — 71045 X-RAY EXAM CHEST 1 VIEW: CPT

## 2025-08-18 PROCEDURE — 93005 ELECTROCARDIOGRAM TRACING: CPT | Mod: TC | Performed by: EMERGENCY MEDICINE

## 2025-08-18 PROCEDURE — 99223 1ST HOSP IP/OBS HIGH 75: CPT | Mod: AI

## 2025-08-18 PROCEDURE — 700111 HCHG RX REV CODE 636 W/ 250 OVERRIDE (IP)

## 2025-08-18 PROCEDURE — 99285 EMERGENCY DEPT VISIT HI MDM: CPT

## 2025-08-18 PROCEDURE — 84484 ASSAY OF TROPONIN QUANT: CPT

## 2025-08-18 PROCEDURE — 700111 HCHG RX REV CODE 636 W/ 250 OVERRIDE (IP): Mod: JZ,UD | Performed by: EMERGENCY MEDICINE

## 2025-08-18 PROCEDURE — 82803 BLOOD GASES ANY COMBINATION: CPT

## 2025-08-18 RX ORDER — INSULIN LISPRO 100 [IU]/ML
2-9 INJECTION, SOLUTION INTRAVENOUS; SUBCUTANEOUS
Status: DISCONTINUED | OUTPATIENT
Start: 2025-08-19 | End: 2025-08-18

## 2025-08-18 RX ORDER — MORPHINE SULFATE 4 MG/ML
4 INJECTION INTRAVENOUS ONCE
Status: COMPLETED | OUTPATIENT
Start: 2025-08-18 | End: 2025-08-18

## 2025-08-18 RX ORDER — NITROGLYCERIN 0.4 MG/1
0.4 TABLET SUBLINGUAL
Status: DISCONTINUED | OUTPATIENT
Start: 2025-08-18 | End: 2025-08-19 | Stop reason: HOSPADM

## 2025-08-18 RX ORDER — DEXTROSE MONOHYDRATE 25 G/50ML
25 INJECTION, SOLUTION INTRAVENOUS
Status: DISCONTINUED | OUTPATIENT
Start: 2025-08-18 | End: 2025-08-18

## 2025-08-18 RX ORDER — SODIUM CHLORIDE 9 MG/ML
INJECTION, SOLUTION INTRAVENOUS CONTINUOUS
Status: ACTIVE | OUTPATIENT
Start: 2025-08-18 | End: 2025-08-19

## 2025-08-18 RX ORDER — ATORVASTATIN CALCIUM 20 MG/1
20 TABLET, FILM COATED ORAL
Status: DISCONTINUED | OUTPATIENT
Start: 2025-08-18 | End: 2025-08-19 | Stop reason: HOSPADM

## 2025-08-18 RX ORDER — INSULIN LISPRO 100 [IU]/ML
2-9 INJECTION, SOLUTION INTRAVENOUS; SUBCUTANEOUS EVERY 6 HOURS
Status: DISCONTINUED | OUTPATIENT
Start: 2025-08-19 | End: 2025-08-18

## 2025-08-18 RX ORDER — INSULIN LISPRO 100 [IU]/ML
2-9 INJECTION, SOLUTION INTRAVENOUS; SUBCUTANEOUS
Status: DISCONTINUED | OUTPATIENT
Start: 2025-08-18 | End: 2025-08-19 | Stop reason: HOSPADM

## 2025-08-18 RX ORDER — PREDNISONE 5 MG/1
5 TABLET ORAL DAILY
Status: DISCONTINUED | OUTPATIENT
Start: 2025-08-19 | End: 2025-08-19 | Stop reason: HOSPADM

## 2025-08-18 RX ORDER — LEVOTHYROXINE SODIUM 88 UG/1
88 TABLET ORAL
Status: DISCONTINUED | OUTPATIENT
Start: 2025-08-19 | End: 2025-08-19 | Stop reason: HOSPADM

## 2025-08-18 RX ORDER — MYCOPHENOLATE MOFETIL 250 MG/1
250 CAPSULE ORAL 2 TIMES DAILY
Status: DISCONTINUED | OUTPATIENT
Start: 2025-08-18 | End: 2025-08-19 | Stop reason: HOSPADM

## 2025-08-18 RX ORDER — INSULIN LISPRO 100 [IU]/ML
2-9 INJECTION, SOLUTION INTRAVENOUS; SUBCUTANEOUS
Status: DISCONTINUED | OUTPATIENT
Start: 2025-08-18 | End: 2025-08-18

## 2025-08-18 RX ORDER — ONDANSETRON 2 MG/ML
4 INJECTION INTRAMUSCULAR; INTRAVENOUS ONCE
Status: COMPLETED | OUTPATIENT
Start: 2025-08-18 | End: 2025-08-18

## 2025-08-18 RX ORDER — INSULIN LISPRO 100 [IU]/ML
10 INJECTION, SOLUTION INTRAVENOUS; SUBCUTANEOUS ONCE
Status: DISCONTINUED | OUTPATIENT
Start: 2025-08-18 | End: 2025-08-18

## 2025-08-18 RX ORDER — TACROLIMUS 1 MG/1
1 CAPSULE ORAL 2 TIMES DAILY
Status: DISCONTINUED | OUTPATIENT
Start: 2025-08-18 | End: 2025-08-19 | Stop reason: HOSPADM

## 2025-08-18 RX ORDER — DEXTROSE MONOHYDRATE 25 G/50ML
25 INJECTION, SOLUTION INTRAVENOUS
Status: DISCONTINUED | OUTPATIENT
Start: 2025-08-18 | End: 2025-08-19 | Stop reason: HOSPADM

## 2025-08-18 RX ADMIN — TACROLIMUS 1 MG: 1 CAPSULE ORAL at 20:30

## 2025-08-18 RX ADMIN — APIXABAN 5 MG: 5 TABLET, FILM COATED ORAL at 20:30

## 2025-08-18 RX ADMIN — INSULIN LISPRO 5 UNITS: 100 INJECTION, SOLUTION INTRAVENOUS; SUBCUTANEOUS at 23:10

## 2025-08-18 RX ADMIN — MORPHINE SULFATE 4 MG: 4 INJECTION, SOLUTION INTRAMUSCULAR; INTRAVENOUS at 17:06

## 2025-08-18 RX ADMIN — ONDANSETRON 4 MG: 2 INJECTION INTRAMUSCULAR; INTRAVENOUS at 17:06

## 2025-08-18 RX ADMIN — ATORVASTATIN CALCIUM 20 MG: 20 TABLET, FILM COATED ORAL at 21:59

## 2025-08-18 RX ADMIN — INSULIN GLARGINE-YFGN 20 UNITS: 100 INJECTION, SOLUTION SUBCUTANEOUS at 23:00

## 2025-08-18 RX ADMIN — MYCOPHENOLATE MOFETIL 250 MG: 250 CAPSULE ORAL at 21:21

## 2025-08-18 RX ADMIN — SODIUM CHLORIDE: 9 INJECTION, SOLUTION INTRAVENOUS at 22:00

## 2025-08-18 ASSESSMENT — CHA2DS2 SCORE
SEX: FEMALE
VASCULAR DISEASE: YES
CHF OR LEFT VENTRICULAR DYSFUNCTION: YES
HYPERTENSION: YES
AGE 65 TO 74: NO
CHA2DS2 VASC SCORE: 7
DIABETES: YES
PRIOR STROKE OR TIA OR THROMBOEMBOLISM: NO
AGE 75 OR GREATER: YES

## 2025-08-18 ASSESSMENT — LIFESTYLE VARIABLES
DOES PATIENT WANT TO STOP DRINKING: CANNOT ASSESS
EVER HAD A DRINK FIRST THING IN THE MORNING TO STEADY YOUR NERVES TO GET RID OF A HANGOVER: NO
AVERAGE NUMBER OF DAYS PER WEEK YOU HAVE A DRINK CONTAINING ALCOHOL: 0
ALCOHOL_USE: NO
TOTAL SCORE: 0
HOW MANY TIMES IN THE PAST YEAR HAVE YOU HAD 5 OR MORE DRINKS IN A DAY: 0
CONSUMPTION TOTAL: NEGATIVE
TOTAL SCORE: 0
HAVE PEOPLE ANNOYED YOU BY CRITICIZING YOUR DRINKING: NO
ON A TYPICAL DAY WHEN YOU DRINK ALCOHOL HOW MANY DRINKS DO YOU HAVE: 0
EVER FELT BAD OR GUILTY ABOUT YOUR DRINKING: NO
TOTAL SCORE: 0
HAVE YOU EVER FELT YOU SHOULD CUT DOWN ON YOUR DRINKING: NO

## 2025-08-18 ASSESSMENT — COGNITIVE AND FUNCTIONAL STATUS - GENERAL
WALKING IN HOSPITAL ROOM: A LITTLE
DAILY ACTIVITIY SCORE: 24
CLIMB 3 TO 5 STEPS WITH RAILING: A LITTLE
SUGGESTED CMS G CODE MODIFIER MOBILITY: CJ
SUGGESTED CMS G CODE MODIFIER DAILY ACTIVITY: CH
MOBILITY SCORE: 22

## 2025-08-18 ASSESSMENT — FIBROSIS 4 INDEX
FIB4 SCORE: 3.58
FIB4 SCORE: 3.14

## 2025-08-18 ASSESSMENT — PAIN DESCRIPTION - PAIN TYPE: TYPE: ACUTE PAIN

## 2025-08-19 ENCOUNTER — APPOINTMENT (OUTPATIENT)
Dept: RADIOLOGY | Facility: MEDICAL CENTER | Age: 75
DRG: 303 | End: 2025-08-19
Payer: MEDICARE

## 2025-08-19 VITALS
DIASTOLIC BLOOD PRESSURE: 55 MMHG | SYSTOLIC BLOOD PRESSURE: 143 MMHG | WEIGHT: 116.4 LBS | TEMPERATURE: 97.8 F | BODY MASS INDEX: 22.85 KG/M2 | HEIGHT: 60 IN | RESPIRATION RATE: 18 BRPM | OXYGEN SATURATION: 95 % | HEART RATE: 57 BPM

## 2025-08-19 PROBLEM — R07.9 CHEST PAIN: Status: RESOLVED | Noted: 2025-08-18 | Resolved: 2025-08-19

## 2025-08-19 LAB
ANION GAP SERPL CALC-SCNC: 12 MMOL/L (ref 7–16)
B-OH-BUTYR SERPL-MCNC: 0.04 MMOL/L (ref 0.02–0.27)
BASE EXCESS BLDV CALC-SCNC: 1 MMOL/L (ref -2–3)
BK PLASMA INTERP, QNT NAAT NL11711: DETECTED
BK PLASMA IU/ML, QNT NAAT NL11709: ABNORMAL IU/ML
BK PLASMA LOG IU/ML, QNT NAAT NL11710: ABNORMAL LOG IU/ML
BODY TEMPERATURE: 36.9 CENTIGRADE
BUN SERPL-MCNC: 41 MG/DL (ref 8–22)
CALCIUM SERPL-MCNC: 9.4 MG/DL (ref 8.5–10.5)
CHLORIDE SERPL-SCNC: 102 MMOL/L (ref 96–112)
CO2 SERPL-SCNC: 18 MMOL/L (ref 20–33)
CREAT SERPL-MCNC: 2.04 MG/DL (ref 0.5–1.4)
EKG IMPRESSION: NORMAL
ERYTHROCYTE [DISTWIDTH] IN BLOOD BY AUTOMATED COUNT: 41 FL (ref 35.9–50)
GFR SERPLBLD CREATININE-BSD FMLA CKD-EPI: 25 ML/MIN/1.73 M 2
GLUCOSE SERPL-MCNC: 109 MG/DL (ref 65–99)
HCO3 BLDV-SCNC: 23 MMOL/L (ref 22–29)
HCT VFR BLD AUTO: 39.4 % (ref 37–47)
HGB BLD-MCNC: 12.6 G/DL (ref 12–16)
LACTATE SERPL-SCNC: 0.8 MMOL/L (ref 0.5–2)
MAGNESIUM SERPL-MCNC: 2 MG/DL (ref 1.5–2.5)
MCH RBC QN AUTO: 26.6 PG (ref 27–33)
MCHC RBC AUTO-ENTMCNC: 32 G/DL (ref 32.2–35.5)
MCV RBC AUTO: 83.1 FL (ref 81.4–97.8)
PCO2 BLDV: 32.9 MMHG (ref 38–54)
PCO2 TEMP ADJ BLDV: 32.8 MMHG (ref 38–54)
PH BLDV: 7.45 [PH] (ref 7.31–7.45)
PH TEMP ADJ BLDV: 7.45 [PH] (ref 7.31–7.45)
PHOSPHATE SERPL-MCNC: 3 MG/DL (ref 2.5–4.5)
PLATELET # BLD AUTO: 86 K/UL (ref 164–446)
PLATELETS.RETICULATED NFR BLD AUTO: 6 % (ref 0.6–13.1)
PMV BLD AUTO: 12 FL (ref 9–12.9)
PO2 BLDV: 60.9 MMHG (ref 23–48)
PO2 TEMP ADJ BLDV: 60.5 MMHG (ref 23–48)
POTASSIUM SERPL-SCNC: 4.1 MMOL/L (ref 3.6–5.5)
RBC # BLD AUTO: 4.74 M/UL (ref 4.2–5.4)
SAO2 % BLDV: 88 % (ref 60–85)
SODIUM SERPL-SCNC: 132 MMOL/L (ref 135–145)
WBC # BLD AUTO: 2.9 K/UL (ref 4.8–10.8)

## 2025-08-19 PROCEDURE — 700102 HCHG RX REV CODE 250 W/ 637 OVERRIDE(OP)

## 2025-08-19 PROCEDURE — 99239 HOSP IP/OBS DSCHRG MGMT >30: CPT | Performed by: INTERNAL MEDICINE

## 2025-08-19 PROCEDURE — 84100 ASSAY OF PHOSPHORUS: CPT

## 2025-08-19 PROCEDURE — 80048 BASIC METABOLIC PNL TOTAL CA: CPT

## 2025-08-19 PROCEDURE — 83735 ASSAY OF MAGNESIUM: CPT

## 2025-08-19 PROCEDURE — 93010 ELECTROCARDIOGRAM REPORT: CPT | Performed by: INTERNAL MEDICINE

## 2025-08-19 PROCEDURE — A9502 TC99M TETROFOSMIN: HCPCS

## 2025-08-19 PROCEDURE — 85055 RETICULATED PLATELET ASSAY: CPT

## 2025-08-19 PROCEDURE — 82010 KETONE BODYS QUAN: CPT

## 2025-08-19 PROCEDURE — 82803 BLOOD GASES ANY COMBINATION: CPT

## 2025-08-19 PROCEDURE — 700111 HCHG RX REV CODE 636 W/ 250 OVERRIDE (IP)

## 2025-08-19 PROCEDURE — 85027 COMPLETE CBC AUTOMATED: CPT

## 2025-08-19 PROCEDURE — 83605 ASSAY OF LACTIC ACID: CPT

## 2025-08-19 PROCEDURE — A9270 NON-COVERED ITEM OR SERVICE: HCPCS

## 2025-08-19 RX ORDER — REGADENOSON 0.08 MG/ML
0.4 INJECTION, SOLUTION INTRAVENOUS ONCE
Status: COMPLETED | OUTPATIENT
Start: 2025-08-19 | End: 2025-08-19

## 2025-08-19 RX ORDER — AMINOPHYLLINE 25 MG/ML
100 INJECTION, SOLUTION INTRAVENOUS
Status: DISCONTINUED | OUTPATIENT
Start: 2025-08-19 | End: 2025-08-19 | Stop reason: HOSPADM

## 2025-08-19 RX ORDER — REGADENOSON 0.08 MG/ML
INJECTION, SOLUTION INTRAVENOUS
Status: COMPLETED
Start: 2025-08-19 | End: 2025-08-19

## 2025-08-19 RX ADMIN — LEVOTHYROXINE SODIUM 88 MCG: 0.09 TABLET ORAL at 05:12

## 2025-08-19 RX ADMIN — REGADENOSON 0.4 MG: 0.08 INJECTION, SOLUTION INTRAVENOUS at 08:44

## 2025-08-19 RX ADMIN — MYCOPHENOLATE MOFETIL 250 MG: 250 CAPSULE ORAL at 05:11

## 2025-08-19 RX ADMIN — APIXABAN 5 MG: 5 TABLET, FILM COATED ORAL at 05:12

## 2025-08-19 RX ADMIN — PREDNISONE 5 MG: 5 TABLET ORAL at 05:12

## 2025-08-19 RX ADMIN — TACROLIMUS 1 MG: 1 CAPSULE ORAL at 05:12

## 2025-08-19 ASSESSMENT — FIBROSIS 4 INDEX: FIB4 SCORE: 5.04

## 2025-08-19 ASSESSMENT — PAIN DESCRIPTION - PAIN TYPE: TYPE: ACUTE PAIN

## 2025-08-20 ENCOUNTER — DOCUMENTATION (OUTPATIENT)
Dept: HEALTH INFORMATION MANAGEMENT | Facility: OTHER | Age: 75
End: 2025-08-20

## 2025-08-20 ENCOUNTER — HOME HEALTH ADMISSION (OUTPATIENT)
Dept: HOME HEALTH SERVICES | Facility: HOME HEALTHCARE | Age: 75
End: 2025-08-20
Payer: MEDICARE

## 2025-08-20 ENCOUNTER — OFFICE VISIT (OUTPATIENT)
Dept: MEDICAL GROUP | Facility: IMAGING CENTER | Age: 75
End: 2025-08-20
Payer: MEDICARE

## 2025-08-20 ENCOUNTER — APPOINTMENT (OUTPATIENT)
Dept: MEDICAL GROUP | Facility: IMAGING CENTER | Age: 75
End: 2025-08-20
Payer: MEDICARE

## 2025-08-20 ENCOUNTER — APPOINTMENT (OUTPATIENT)
Dept: ENDOCRINOLOGY | Facility: MEDICAL CENTER | Age: 75
End: 2025-08-20
Payer: MEDICARE

## 2025-08-20 ENCOUNTER — PATIENT OUTREACH (OUTPATIENT)
Dept: MEDICAL GROUP | Facility: MEDICAL CENTER | Age: 75
End: 2025-08-20

## 2025-08-20 ENCOUNTER — OFFICE VISIT (OUTPATIENT)
Dept: ENDOCRINOLOGY | Facility: MEDICAL CENTER | Age: 75
End: 2025-08-20
Payer: MEDICARE

## 2025-08-20 VITALS
BODY MASS INDEX: 22.08 KG/M2 | DIASTOLIC BLOOD PRESSURE: 57 MMHG | SYSTOLIC BLOOD PRESSURE: 121 MMHG | OXYGEN SATURATION: 96 % | WEIGHT: 120 LBS | HEART RATE: 53 BPM | HEIGHT: 62 IN

## 2025-08-20 VITALS
OXYGEN SATURATION: 94 % | SYSTOLIC BLOOD PRESSURE: 110 MMHG | HEIGHT: 60 IN | TEMPERATURE: 97.4 F | BODY MASS INDEX: 22.78 KG/M2 | DIASTOLIC BLOOD PRESSURE: 40 MMHG | HEART RATE: 51 BPM | WEIGHT: 116 LBS

## 2025-08-20 DIAGNOSIS — D68.69 SECONDARY HYPERCOAGULABLE STATE (HCC): ICD-10-CM

## 2025-08-20 DIAGNOSIS — Z79.4 TYPE 2 DIABETES MELLITUS WITH STAGE 4 CHRONIC KIDNEY DISEASE, WITH LONG-TERM CURRENT USE OF INSULIN (HCC): Primary | ICD-10-CM

## 2025-08-20 DIAGNOSIS — E55.9 VITAMIN D DEFICIENCY: ICD-10-CM

## 2025-08-20 DIAGNOSIS — Z79.4 TYPE 2 DIABETES MELLITUS WITH STAGE 4 CHRONIC KIDNEY DISEASE, WITH LONG-TERM CURRENT USE OF INSULIN (HCC): ICD-10-CM

## 2025-08-20 DIAGNOSIS — I10 PRIMARY HYPERTENSION: Chronic | ICD-10-CM

## 2025-08-20 DIAGNOSIS — Z91.148 NONCOMPLIANCE WITH MEDICATIONS: ICD-10-CM

## 2025-08-20 DIAGNOSIS — E78.5 DYSLIPIDEMIA: ICD-10-CM

## 2025-08-20 DIAGNOSIS — N18.4 TYPE 2 DIABETES MELLITUS WITH STAGE 4 CHRONIC KIDNEY DISEASE, WITH LONG-TERM CURRENT USE OF INSULIN (HCC): ICD-10-CM

## 2025-08-20 DIAGNOSIS — N18.4 CKD (CHRONIC KIDNEY DISEASE) STAGE 4, GFR 15-29 ML/MIN (HCC): ICD-10-CM

## 2025-08-20 DIAGNOSIS — N18.6 ESRD (END STAGE RENAL DISEASE) (HCC): ICD-10-CM

## 2025-08-20 DIAGNOSIS — I25.10 CARDIOVASCULAR DISEASE: ICD-10-CM

## 2025-08-20 DIAGNOSIS — Z79.899 POLYPHARMACY: ICD-10-CM

## 2025-08-20 DIAGNOSIS — N18.4 TYPE 2 DIABETES MELLITUS WITH STAGE 4 CHRONIC KIDNEY DISEASE, WITH LONG-TERM CURRENT USE OF INSULIN (HCC): Primary | ICD-10-CM

## 2025-08-20 DIAGNOSIS — E11.22 TYPE 2 DIABETES MELLITUS WITH STAGE 4 CHRONIC KIDNEY DISEASE, WITH LONG-TERM CURRENT USE OF INSULIN (HCC): Primary | ICD-10-CM

## 2025-08-20 DIAGNOSIS — E03.9 HYPOTHYROIDISM, ACQUIRED: ICD-10-CM

## 2025-08-20 DIAGNOSIS — N25.81 SECONDARY HYPERPARATHYROIDISM (HCC): ICD-10-CM

## 2025-08-20 DIAGNOSIS — G62.9 POLYNEUROPATHY: ICD-10-CM

## 2025-08-20 DIAGNOSIS — I48.0 PAROXYSMAL ATRIAL FIBRILLATION (HCC): ICD-10-CM

## 2025-08-20 DIAGNOSIS — I48.91 ATRIAL FIBRILLATION WITH RAPID VENTRICULAR RESPONSE (HCC): ICD-10-CM

## 2025-08-20 DIAGNOSIS — Z94.0 DECEASED-DONOR KIDNEY TRANSPLANT RECIPIENT: Chronic | ICD-10-CM

## 2025-08-20 DIAGNOSIS — E11.22 TYPE 2 DIABETES MELLITUS WITH STAGE 4 CHRONIC KIDNEY DISEASE, WITH LONG-TERM CURRENT USE OF INSULIN (HCC): ICD-10-CM

## 2025-08-20 DIAGNOSIS — I10 ESSENTIAL HYPERTENSION: ICD-10-CM

## 2025-08-20 DIAGNOSIS — M79.89 LEG SWELLING: Primary | ICD-10-CM

## 2025-08-20 PROCEDURE — 99213 OFFICE O/P EST LOW 20 MIN: CPT

## 2025-08-20 PROCEDURE — 99212 OFFICE O/P EST SF 10 MIN: CPT

## 2025-08-20 PROCEDURE — 95249 CONT GLUC MNTR PT PROV EQP: CPT

## 2025-08-20 RX ORDER — AMLODIPINE BESYLATE 10 MG/1
10 TABLET ORAL DAILY
Qty: 100 TABLET | Refills: 3 | Status: SHIPPED | OUTPATIENT
Start: 2025-08-20 | End: 2025-08-20

## 2025-08-20 RX ORDER — AMLODIPINE BESYLATE 10 MG/1
TABLET ORAL
Qty: 100 TABLET | Refills: 3 | Status: SHIPPED | OUTPATIENT
Start: 2025-08-20

## 2025-08-20 RX ORDER — INSULIN GLARGINE 100 [IU]/ML
INJECTION, SOLUTION SUBCUTANEOUS
Qty: 90 ML | Refills: 3 | Status: SHIPPED | OUTPATIENT
Start: 2025-08-20

## 2025-08-20 RX ORDER — PREGABALIN 25 MG/1
50 CAPSULE ORAL NIGHTLY
Qty: 60 CAPSULE | Refills: 4 | Status: SHIPPED | OUTPATIENT
Start: 2025-08-20 | End: 2025-09-19

## 2025-08-20 RX ORDER — INSULIN ASPART INJECTION 100 [IU]/ML
26 INJECTION, SOLUTION SUBCUTANEOUS
Qty: 66 ML | Refills: 3 | Status: SHIPPED | OUTPATIENT
Start: 2025-08-20 | End: 2025-08-21 | Stop reason: SDUPTHER

## 2025-08-20 ASSESSMENT — FIBROSIS 4 INDEX
FIB4 SCORE: 5.04
FIB4 SCORE: 5.04

## 2025-08-21 DIAGNOSIS — Z79.4 TYPE 2 DIABETES MELLITUS WITH STAGE 4 CHRONIC KIDNEY DISEASE, WITH LONG-TERM CURRENT USE OF INSULIN (HCC): ICD-10-CM

## 2025-08-21 DIAGNOSIS — E11.22 TYPE 2 DIABETES MELLITUS WITH STAGE 4 CHRONIC KIDNEY DISEASE, WITH LONG-TERM CURRENT USE OF INSULIN (HCC): ICD-10-CM

## 2025-08-21 DIAGNOSIS — N18.4 TYPE 2 DIABETES MELLITUS WITH STAGE 4 CHRONIC KIDNEY DISEASE, WITH LONG-TERM CURRENT USE OF INSULIN (HCC): ICD-10-CM

## 2025-08-21 RX ORDER — INSULIN ASPART INJECTION 100 [IU]/ML
26 INJECTION, SOLUTION SUBCUTANEOUS
Qty: 66 ML | Refills: 3 | Status: SHIPPED | OUTPATIENT
Start: 2025-08-21

## 2025-08-22 DIAGNOSIS — I10 PRIMARY HYPERTENSION: Chronic | ICD-10-CM

## 2025-08-25 RX ORDER — AMLODIPINE BESYLATE 10 MG/1
10 TABLET ORAL DAILY
Qty: 90 TABLET | Refills: 3 | OUTPATIENT
Start: 2025-08-25

## 2025-08-26 ENCOUNTER — OFFICE VISIT (OUTPATIENT)
Dept: NEPHROLOGY | Facility: MEDICAL CENTER | Age: 75
End: 2025-08-26
Payer: MEDICARE

## 2025-08-26 VITALS
HEART RATE: 55 BPM | SYSTOLIC BLOOD PRESSURE: 130 MMHG | DIASTOLIC BLOOD PRESSURE: 40 MMHG | WEIGHT: 119 LBS | TEMPERATURE: 98 F | BODY MASS INDEX: 23.36 KG/M2 | HEIGHT: 60 IN | OXYGEN SATURATION: 95 %

## 2025-08-26 DIAGNOSIS — I10 ESSENTIAL HYPERTENSION: ICD-10-CM

## 2025-08-26 DIAGNOSIS — Z79.4 TYPE 2 DIABETES MELLITUS WITH STAGE 4 CHRONIC KIDNEY DISEASE, WITH LONG-TERM CURRENT USE OF INSULIN (HCC): ICD-10-CM

## 2025-08-26 DIAGNOSIS — Z94.0 IMMUNOSUPPRESSIVE MANAGEMENT ENCOUNTER FOLLOWING KIDNEY TRANSPLANT: Chronic | ICD-10-CM

## 2025-08-26 DIAGNOSIS — N18.4 CKD (CHRONIC KIDNEY DISEASE) STAGE 4, GFR 15-29 ML/MIN (HCC): ICD-10-CM

## 2025-08-26 DIAGNOSIS — N25.81 SECONDARY HYPERPARATHYROIDISM (HCC): ICD-10-CM

## 2025-08-26 DIAGNOSIS — Z79.899 IMMUNOSUPPRESSIVE MANAGEMENT ENCOUNTER FOLLOWING KIDNEY TRANSPLANT: Chronic | ICD-10-CM

## 2025-08-26 DIAGNOSIS — Z87.440 HISTORY OF UTI: ICD-10-CM

## 2025-08-26 DIAGNOSIS — N18.4 TYPE 2 DIABETES MELLITUS WITH STAGE 4 CHRONIC KIDNEY DISEASE, WITH LONG-TERM CURRENT USE OF INSULIN (HCC): ICD-10-CM

## 2025-08-26 DIAGNOSIS — Z94.0 DECEASED-DONOR KIDNEY TRANSPLANT RECIPIENT: Primary | Chronic | ICD-10-CM

## 2025-08-26 DIAGNOSIS — E11.22 TYPE 2 DIABETES MELLITUS WITH STAGE 4 CHRONIC KIDNEY DISEASE, WITH LONG-TERM CURRENT USE OF INSULIN (HCC): ICD-10-CM

## 2025-08-26 PROCEDURE — 99214 OFFICE O/P EST MOD 30 MIN: CPT | Performed by: INTERNAL MEDICINE

## 2025-08-26 PROCEDURE — 3078F DIAST BP <80 MM HG: CPT | Performed by: INTERNAL MEDICINE

## 2025-08-26 PROCEDURE — 3075F SYST BP GE 130 - 139MM HG: CPT | Performed by: INTERNAL MEDICINE

## 2025-08-26 PROCEDURE — G2211 COMPLEX E/M VISIT ADD ON: HCPCS | Performed by: INTERNAL MEDICINE

## 2025-08-26 RX ORDER — SODIUM BICARBONATE 650 MG/1
650 TABLET ORAL 2 TIMES DAILY
Qty: 180 TABLET | Refills: 3 | Status: SHIPPED | OUTPATIENT
Start: 2025-08-26

## 2025-08-26 RX ORDER — DAPAGLIFLOZIN 10 MG/1
10 TABLET, FILM COATED ORAL DAILY
Qty: 100 TABLET | Refills: 3 | Status: SHIPPED | OUTPATIENT
Start: 2025-08-26 | End: 2026-09-30

## 2025-08-26 ASSESSMENT — ENCOUNTER SYMPTOMS
ABDOMINAL PAIN: 0
FEVER: 0
SHORTNESS OF BREATH: 0

## 2025-08-26 ASSESSMENT — FIBROSIS 4 INDEX: FIB4 SCORE: 5.04

## 2025-08-27 DIAGNOSIS — I10 PRIMARY HYPERTENSION: Chronic | ICD-10-CM

## 2025-08-27 RX ORDER — AMLODIPINE BESYLATE 10 MG/1
TABLET ORAL
Qty: 100 TABLET | Refills: 3 | OUTPATIENT
Start: 2025-08-27

## 2025-08-29 ENCOUNTER — SPECIALTY PHARMACY (OUTPATIENT)
Dept: ENDOCRINOLOGY | Facility: MEDICAL CENTER | Age: 75
End: 2025-08-29
Payer: MEDICARE

## 2026-01-14 ENCOUNTER — APPOINTMENT (OUTPATIENT)
Dept: MEDICAL GROUP | Facility: MEDICAL CENTER | Age: 76
End: 2026-01-14
Payer: MEDICARE

## (undated) DEVICE — LACTATED RINGERS INJ 1000 ML - (14EA/CA 60CA/PF)

## (undated) DEVICE — COVER LIGHT HANDLE FLEXIBLE - SOFT (2EA/PK 80PK/CA)

## (undated) DEVICE — KIT  I.V. START (100EA/CA)

## (undated) DEVICE — GOWN WARMING STANDARD FLEX - (30/CA)

## (undated) DEVICE — SENSOR OXIMETER ADULT SPO2 RD SET (20EA/BX)

## (undated) DEVICE — TUBING CLEARLINK DUO-VENT - C-FLO (48EA/CA)

## (undated) DEVICE — ELECTRODE DUAL RETURN W/ CORD - (50/PK)

## (undated) DEVICE — TOWEL STOP TIMEOUT SAFETY FLAG (40EA/CA)

## (undated) DEVICE — CANNULA O2 COMFORT SOFT EAR ADULT 7 FT TUBING (50/CA)